# Patient Record
Sex: MALE | Race: WHITE | NOT HISPANIC OR LATINO | Employment: OTHER | URBAN - METROPOLITAN AREA
[De-identification: names, ages, dates, MRNs, and addresses within clinical notes are randomized per-mention and may not be internally consistent; named-entity substitution may affect disease eponyms.]

---

## 2017-01-04 ENCOUNTER — OFFICE VISIT (OUTPATIENT)
Dept: RHEUMATOLOGY | Facility: PHYSICIAN GROUP | Age: 77
End: 2017-01-04
Payer: MEDICARE

## 2017-01-04 VITALS — DIASTOLIC BLOOD PRESSURE: 58 MMHG | SYSTOLIC BLOOD PRESSURE: 150 MMHG | BODY MASS INDEX: 24.42 KG/M2 | WEIGHT: 163 LBS

## 2017-01-04 DIAGNOSIS — Z99.2 ESRD (END STAGE RENAL DISEASE) ON DIALYSIS (HCC): ICD-10-CM

## 2017-01-04 DIAGNOSIS — G56.03 CARPAL TUNNEL SYNDROME, BILATERAL: ICD-10-CM

## 2017-01-04 DIAGNOSIS — N25.81 SECONDARY RENAL HYPERPARATHYROIDISM (HCC): ICD-10-CM

## 2017-01-04 DIAGNOSIS — S37.009S: ICD-10-CM

## 2017-01-04 DIAGNOSIS — M19.042 PRIMARY OSTEOARTHRITIS OF BOTH HANDS: ICD-10-CM

## 2017-01-04 DIAGNOSIS — M1A.09X0 IDIOPATHIC CHRONIC GOUT OF MULTIPLE SITES WITHOUT TOPHUS: ICD-10-CM

## 2017-01-04 DIAGNOSIS — N18.6 ESRD (END STAGE RENAL DISEASE) ON DIALYSIS (HCC): ICD-10-CM

## 2017-01-04 DIAGNOSIS — M19.041 PRIMARY OSTEOARTHRITIS OF BOTH HANDS: ICD-10-CM

## 2017-01-04 PROCEDURE — 99214 OFFICE O/P EST MOD 30 MIN: CPT | Mod: 25 | Performed by: INTERNAL MEDICINE

## 2017-01-04 PROCEDURE — 20526 THER INJECTION CARP TUNNEL: CPT | Mod: 50 | Performed by: INTERNAL MEDICINE

## 2017-01-04 RX ORDER — METHYLPREDNISOLONE ACETATE 40 MG/ML
40 INJECTION, SUSPENSION INTRA-ARTICULAR; INTRALESIONAL; INTRAMUSCULAR; SOFT TISSUE ONCE
Status: DISCONTINUED | OUTPATIENT
Start: 2017-01-04 | End: 2017-01-13

## 2017-01-04 RX ORDER — FAMOTIDINE 20 MG/1
20 TABLET, FILM COATED ORAL 2 TIMES DAILY
COMMUNITY
End: 2017-04-15

## 2017-01-04 RX ADMIN — METHYLPREDNISOLONE ACETATE 20 MG: 40 INJECTION, SUSPENSION INTRA-ARTICULAR; INTRALESIONAL; INTRAMUSCULAR; SOFT TISSUE at 12:35

## 2017-01-04 NOTE — PROGRESS NOTES
Chief Complaint- joint pain    Subjective:   Parmjit Castelan is a 76 y.o. male here today for follow up of rheumatological issues    Patient here to followup bilateral hand pain, status post x-ray indicating DJD, status post evaluation with serologies all which were negative, status post EMG/MCV indicating moderate to severe carpal tunnel syndrome.  Patient with renal failure on dialysis 3 times a week and is unable to take NSAIDs, currently takes hydrocodone when necessary which did help at the beginning but patient states is no one helping. Patient also with chronic gout,  Patient states he hasn't had any gout flares since last visit while off of allopurinol, Patient also with coronary artery disease, recently status post a left arm renal dialysis shunt revision, also with COPD and obstructive sleep apnea. Patient also with polycystic kidney disease, on dialysis, patient also is sleep apnea and uses a nighttime oxygen, unable to use a C Pap machine  Patient here to follow-up on his carpal tunnel, patient states he's got numbness in his right hand thumb and index and third finger, and then also on the left thumb, patient queries about getting cortisone injections that he had received at last visit which helped quite a bit. If ineffective then we may consider surgical intervention with decompression.    S/p Uloric-unable to afford  S/p allopurinol-felt to cause HTN    Hep B neg 5/2009  Uric Acid 7.1 normal 6/2015  RF neg 6/2015  CCP neg 6/2015  AWILDA neg 5/2009  C3 90 normal 5/2009  C4 19 normal 5/2009  G6PD 12.0 adequate 1/2016  Hand X-rays done 6/2015 indicates DJD   EMG/NCV Right hand 8/2016-indicates moderate-severe CTS       Current medicines (including changes today)  Current Outpatient Prescriptions   Medication Sig Dispense Refill   • famotidine (PEPCID) 20 MG Tab Take 20 mg by mouth 2 times a day.     • tamsulosin (FLOMAX) 0.4 MG capsule TAKE TWO CAPSULES BY MOUTH DAILY 1/2 HOUR AFTER BREAKFAST 180 Cap 11   •  doxazosin (CARDURA) 4 MG Tab Take 1 Tab by mouth every day. 90 Tab 3   • oxycodone-acetaminophen (PERCOCET) 5-325 MG Tab 1 tab po bid prn severe pain NO ALCOHOL and NO DRIVING within 24 hrs of taking this medication, NO BENZODIAZEPINE MEDICATIONS, no selling or giving to any other persons (Patient taking differently: 1 Tab at bedtime as needed. 1 tab po bid prn severe pain NO ALCOHOL and NO DRIVING within 24 hrs of taking this medication, NO BENZODIAZEPINE MEDICATIONS, no selling or giving to any other persons) 60 Tab 0   • Calcium Acetate, Phos Binder, 667 MG Cap TAKE TWO CAPSULES BY MOUTH THREE TIMES A DAY WITH A MEAL 180 Cap 11   • labetalol (NORMODYNE) 100 MG Tab Take 5 Tabs by mouth 2 times a day. 300 Tab 6   • fluticasone (FLONASE) 50 MCG/ACT nasal spray PLACE ONE TO TWO SPRAYS IN EACH NOSTRIL EVERY DAY 1 Bottle 5   • omeprazole (PRILOSEC) 40 MG delayed-release capsule TAKE ONE CAPSULE BY MOUTH DAILY (GENERIC FOR PRILOSEC) 90 Cap 4   • epoetin hilario (EPOGEN,PROCRIT) 2000 UNIT/ML Solution Inject 1 mL as instructed every Monday, Wednesday, and Friday. 1 mL 0   • pravastatin (PRAVACHOL) 10 MG Tab Take 10 mg by mouth every evening.     • minoxidil (LONITEN) 2.5 MG Tab Take 2.5 mg by mouth every day.     • amlodipine (NORVASC) 10 MG Tab TAKE ONE TABLET BY MOUTH DAILY 90 Tab 4   • ondansetron (ZOFRAN ODT) 4 MG TABLET DISPERSIBLE DISSOLVE ONE TABLET BY MOUTH EVERY 6 HOURS AS NEEDED FOR NAUSEA 30 Tab 4   • budesonide-formoterol (SYMBICORT) 160-4.5 MCG/ACT Aerosol Inhale 2 Puffs by mouth 2 Times a Day. Inhale 2 puffs by mouth twice daily. Rinse mouth after each use. 1 Inhaler 12   • lisinopril (PRINIVIL, ZESTRIL) 40 MG tablet TAKE ONE TABLET BY MOUTH TWICE A DAY 60 Tab 11   • trazodone (DESYREL) 150 MG Tab TAKE TWO TABLETS BY MOUTH EVERY NIGHT AT BEDTIME (GENERIC FOR DESYREL) 180 Tab 4   • furosemide (LASIX) 40 MG Tab TAKE TWO TABLETS BY MOUTH TWICE A  Tab 9   • ipratropium-albuterol (COMBIVENT RESPIMAT)   MCG/ACT AERS Inhale 1 Puff by mouth 4 times a day. As needed for cough (Patient taking differently: Inhale 1 Puff by mouth as needed. As needed for cough) 1 Inhaler 1     Current Facility-Administered Medications   Medication Dose Route Frequency Provider Last Rate Last Dose   • methylPREDNISolone acetate (DEPO-MEDROL) injection 40 mg  40 mg Intra-articular Once Shauna Lorenzo M.D.         He  has a past medical history of BPH (7/14/2009); HTN (hypertension) (1/15/2010); Snoring; COPD (chronic obstructive pulmonary disease) ([facility]); Proteinuria; COPD (chronic obstructive pulmonary disease) ([facility]) (8/2/2011); EMPHYSEMA; Detached retina; CKD (chronic kidney disease) stage 4, GFR 15-29 ml/min ([facility]) (1/15/2010); Kidney cyst; On supplemental oxygen therapy; Heart burn; Indigestion; Bronchitis (1/1/13); CAD (coronary artery disease) (2/20/2014); Sleep apnea; Dialysis; CATARACT; Basal cell carcinoma of left cheek (3/26/2015); Leg pain, bilateral (8/10/2015); Gout; Pneumonia; Anesthesia; and High cholesterol. He also has no past medical history of Liver disease.    ROS   Other than what is mentioned in HPI or physical exam, there is no history of headaches, double vision or blurred vision. No temporal tenderness or jaw claudication. No history of cataracts or glaucoma. No trouble swallowing difficulties or sore throats. No history of thyroid disease. No chest complaints including chest pain, cough or sputum production. No shortness of breath. No GI complaints including nausea, vomiting, change in bowel habits, or past peptic ulcer disease. No history of blood in the stools. No urinary complaints including dysuria or frequency. No history of rash including psoriasis. No history of alopecia, photosensitivity, oral ulcerations, Raynaud's phenomena, or swollen joints. No history of gout. No back complaints. No history of low blood counts.       Objective:     Blood pressure 150/58, weight 73.936 kg (163 lb). Body mass index  is 24.42 kg/(m^2).   Physical Exam:    General:Alert, oriented X 3 in no acute distress.Eye contact is good, speech goal directed, affect calmHEENT: conjunctiva non-injected, sclera non-icteric.Pinna normal. TM pearly gray. Oral mucous membranes pink and moist with no lesions.Neck: No thyromegaly, trachea midline Lymph: No supraclavicular lymphadenopathy, no cervical lymphadenopathy, no axillary lymphadenopathyLungs: clear to auscultation bilaterally with good excursion.CV: regular rate and rhythm.Abdomen: soft, nontender, No CVAT, no HSMNeuro: Cranial nerves 2-12 are grossly intactSkin: No discoid lesions, no petechial lesions, no malar rash, no vasculitic lesionsExt: No swan-neck or boutonniere deformities, no sausage digits, no digital tip ulcerations, no ulnar deviation, no ulnar styloid process prominence, patient is able to make full fists but feels stiffness with movement. No evidence of thenar or hyperthenar eminence atrophy Patient has a renal dialysis shunt in the left arm.  right hand radial and ulnar pulses are 2+ and symmetrical, there is positive phalen sign bilaterally     Lab Results   Component Value Date/Time    HEPATITIS B CORS AB,IGM Negative 08/12/2016 05:40 PM    HEPATITIS B SURFACE ANTIGEN Negative 08/12/2016 05:40 PM     Lab Results   Component Value Date/Time    HEPATITIS C ANTIBODY Negative 08/12/2016 05:40 PM     Lab Results   Component Value Date/Time    SODIUM 140 12/27/2016 09:06 AM    POTASSIUM 4.6 12/27/2016 09:06 AM    CHLORIDE 99 12/27/2016 09:06 AM    CO2 30 12/27/2016 09:06 AM    GLUCOSE 92 12/27/2016 09:06 AM    BUN 35* 12/27/2016 09:06 AM    CREATININE 7.41* 12/27/2016 09:06 AM      Lab Results   Component Value Date/Time    WBC 4.3* 12/27/2016 09:06 AM    RBC 3.49* 12/27/2016 09:06 AM    HEMOGLOBIN 11.2* 12/27/2016 09:06 AM    HEMATOCRIT 34.9* 12/27/2016 09:06 AM    .0* 12/27/2016 09:06 AM    MCH 32.1 12/27/2016 09:06 AM    MCHC 32.1* 12/27/2016 09:06 AM    MPV 10.1  12/27/2016 09:06 AM    NEUTROPHILS-POLYS 67.50 12/27/2016 09:06 AM    LYMPHOCYTES 14.40* 12/27/2016 09:06 AM    MONOCYTES 8.90 12/27/2016 09:06 AM    EOSINOPHILS 7.80* 12/27/2016 09:06 AM    BASOPHILS 0.90 12/27/2016 09:06 AM      Lab Results   Component Value Date/Time    CALCIUM 10.2 12/27/2016 09:06 AM    AST(SGOT) 29 08/15/2016 11:56 PM    ALT(SGPT) 12 08/15/2016 11:56 PM    ALKALINE PHOSPHATASE 43 08/15/2016 11:56 PM    TOTAL BILIRUBIN 0.5 08/15/2016 11:56 PM    ALBUMIN 3.1* 08/15/2016 11:56 PM    TOTAL PROTEIN 5.0* 08/15/2016 11:56 PM     Lab Results   Component Value Date/Time    URIC ACID 4.6 01/08/2016 01:37 PM    RHEUMATOID FACTOR -NEPH- 11 06/22/2015 04:22 PM    CCP ANTIBODIES 2 06/22/2015 04:22 PM    ANTINUCLEAR ANTIBODY None Detected 05/27/2009 12:16 PM     Lab Results   Component Value Date/Time    C3 COMPLEMENT 90 05/27/2009 12:16 PM    COMPLEMENT C4 19 05/27/2009 12:16 PM     Lab Results   Component Value Date/Time    ANCA IGG <1:20 05/09/2016 02:45 AM    C3 COMPLEMENT 90 05/27/2009 12:16 PM     Lab Results   Component Value Date/Time    COLOR Yellow 10/11/2013 03:29 PM    SPECIFIC GRAVITY 1.019 10/11/2013 03:29 PM    PH 5.5 10/11/2013 03:29 PM    GLUCOSE Negative 10/11/2013 03:29 PM    KETONES Negative 10/11/2013 03:29 PM    PROTEIN >=300* 10/11/2013 03:29 PM     Lab Results   Component Value Date/Time    SED RATE WESTERGREN 1 05/27/2009 12:16 PM     Lab Results   Component Value Date/Time    G-6-PD 12.0 01/08/2016 01:37 PM     Lab Results   Component Value Date/Time    PTH, INTACT 291.8* 10/11/2013 03:28 PM     Results for orders placed during the hospital encounter of 11/23/07   DX-FOOT-COMPLETE 3+    Impression IMPRESSION:    1. DEGENERATIVE CHANGE OF THE BASE OF THE GREAT TOE INVOLVING THE FIRST   METATARSOPHALANGEAL JOINT WITH ASSOCIATED EDEMA.          2. POSSIBLE LYTIC LESION IN THE PROXIMAL FIFTH METATARSAL.  THE ETIOLOGY   IS UNCERTAIN.    3. NO RADIOPAQUE FOREIGN BODY OR SOFT TISSUE GAS  IDENTIFIED.    4. NO FRACTURE IS SEEN.    MM:dxm        Read By CATHY LYNN MD on Nov 23 2007  1:31PM  : DXM Transcription Date: Nov 23 2007  5:48PM  THIS DOCUMENT HAS BEEN ELECTRONICALLY SIGNED BY: CATHY LYNN MD on Nov 30 2007  8:14AM     Results for orders placed during the hospital encounter of 11/27/07   DX-PELVIS-1 OR 2 VIEWS    Impression IMPRESSION:    MILD TO MODERATE OSTEOARTHRITIS IS NOTED IN BOTH HIPS, BUT NO ACUTE   FRACTURE OR DISLOCATION IS NOTED ON SINGLE VIEW PELVIS.             Results for orders placed during the hospital encounter of 11/27/07   DX-PELVIS-1 OR 2 VIEWS    Impression IMPRESSION:    MILD TO MODERATE OSTEOARTHRITIS IS NOTED IN BOTH HIPS, BUT NO ACUTE   FRACTURE OR DISLOCATION IS NOTED ON SINGLE VIEW PELVIS.             Results for orders placed during the hospital encounter of 06/22/15   DX-HAND 3+    Impression Mild degenerative change of the interphalangeal articulations and the triscaphe articulation.       Results for orders placed during the hospital encounter of 07/14/16   DX-CERVICAL SPINE-2 OR 3 VIEWS    Impression 1.  Degenerative disc disease and facet arthropathy are most prominent in the lower cervical spine.    2.  No compression deformity or acute fracture.    3.  Anterolisthesis noted at C7-T1 likely due to facet arthropathy.     Results for orders placed during the hospital encounter of 10/16/10   CT-CHEST (THORAX) W/O     Results for orders placed during the hospital encounter of 10/15/13   US-VISCERAL VASCULAR COMP    Impression 1.  No evidence of renal artery stenosis.  2.  Borderline to mildly increased intrarenal resistive indices are likely related to an intrarenal process.            INTERPRETING LOCATION: [address] [place] [address], [id number]     Assessment and Plan:     1. Carpal tunnel syndrome, bilateral  With exacerbation in spite of splints, patient did get a cortisone injection in the right wrist carpal tunnel August 2016 with good results,  today we'll do bilateral carpal tunnel cortisone injections  - methylPREDNISolone acetate (DEPO-MEDROL) injection 40 mg; 1 mL by Intra-articular route Once.    2. Idiopathic chronic gout of multiple sites without tophus  Patient is off of any medication and doing well, we'll continue to monitor    3. Primary osteoarthritis of both hands  Unable to take NSAIDs because of renal insufficiency, Tylenol or cortisone injections when necessary    4. ESRD (end stage renal disease) on dialysis (HCC)  This may impact what medications we can use to treat this patient's rheumatological disease, we will have to continue to monitor closely.    5. Kidney damage, unspecified laterality, sequela  This may impact what medications we can use to treat this patient's rheumatological disease, we will have to continue to monitor closely.l    6. Secondary renal hyperparathyroidism (HCC)    Followup: Return in about 4 months (around 5/4/2017). or sooner prn need for repeat cortisone injections    Patient was seen 30 minutes face-to-face of which more than 50% of the time was spent counseling the patient (excluding time for procedures)  regarding  rheumatological condition and care. Therapy was discussed in detail.    Please note that this dictation was created using voice recognition software. I have made every reasonable attempt to correct obvious errors, but I expect that there are errors of grammar and possibly content that I did not discover before finalizing the note.    Procedure: Bilateral carpal tunnel cortisone injection    The procedure was explained to the patient in detail, all questions were answered. Risks and benefits were reviewed with patient and patient states understanding including risks of steroid injections including bleeding, infections, subcutaneous lipolysis and/or hypopigmentation at site of injection, and risk of avascular necrosis. Consent was obtained. The patient was positioned appropriately. Anatomical  landmarks were identified.    Ventral aspects bilateral wrists was prepped with betadine x 3, local anesthetic with ethyl chloride and 1% Xylocaine lot #[id number], Exp March 2020 and using sterile technique,  injected 10 mg of Depomedrol Lot #[id number], Exp October 2017 bilateral wrists carpal tunnel    EBL 0  The patient tolerated the procedure well, was observed in the office and there were no complications     Patient was asked to rest bilateral wrists for 3 days, patient states understanding and states will comply.  Approximately 30 minutes time was spent face to face with patient of which at least 50% of time was reviewing risk and benefits of procedure and the procedure in detail.

## 2017-01-04 NOTE — Clinical Note
[facility]   [address], [address]  [place] [address]  Phone: [phone]  Fax: [phone]              Encounter Date: 1/4/2017    Dear Dr. Echavarria ref. provider found,    It was a pleasure seeing your patient, Parmjit Castelan, on 1/4/2017. Diagnoses of Carpal tunnel syndrome, bilateral, Idiopathic chronic gout of multiple sites without tophus, Primary osteoarthritis of both hands, ESRD (end stage renal disease) on dialysis (HCC), Kidney damage, unspecified laterality, sequela, and Secondary renal hyperparathyroidism (HCC) were pertinent to this visit.     Please find attached progress note which includes the history I obtained from Mr. Castelan, my physical examination findings, my impression and recommendations.      Once again, it was a pleasure participating in your patient's care.  Please feel free to contact me if you have any questions or if I can be of any further assistance to your patients.      Sincerely,    Shauna Lorenzo M.D.  Electronically Signed          PROGRESS NOTE:  No notes on file

## 2017-01-24 RX ORDER — METHYLPREDNISOLONE ACETATE 40 MG/ML
40 INJECTION, SUSPENSION INTRA-ARTICULAR; INTRALESIONAL; INTRAMUSCULAR; SOFT TISSUE ONCE
OUTPATIENT
Start: 2017-01-24 | End: 2017-01-25

## 2017-01-25 ENCOUNTER — TELEPHONE (OUTPATIENT)
Dept: RHEUMATOLOGY | Facility: PHYSICIAN GROUP | Age: 77
End: 2017-01-25

## 2017-01-25 DIAGNOSIS — G56.03 BILATERAL CARPAL TUNNEL SYNDROME: ICD-10-CM

## 2017-01-25 NOTE — TELEPHONE ENCOUNTER
Please notify patient that a hand surgeon will want to have something called a nerve conduction study to confirm carpal tunnel in both hands, I will order the nerve conduction study to get done, I have also put the referral and for the orthopedic surgeon that patient will need to get the nerve study done first before he sees the orthopedic surgeon.

## 2017-01-25 NOTE — TELEPHONE ENCOUNTER
1. Caller Name: gabriela Castelan                                         Call Back Number: [phone]      Patient approves a detailed voicemail message: yes    Pt called wanting to know if you would please refer him to a hand surgeon. The cortisone injection you gave him didn't help at all and he would like a referral.  Thank you

## 2017-02-03 ENCOUNTER — NON-PROVIDER VISIT (OUTPATIENT)
Dept: NEUROLOGY | Facility: MEDICAL CENTER | Age: 77
End: 2017-02-03
Payer: MEDICARE

## 2017-02-03 DIAGNOSIS — N18.6 END STAGE RENAL DISEASE (HCC): ICD-10-CM

## 2017-02-03 DIAGNOSIS — G56.03 CARPAL TUNNEL SYNDROME, BILATERAL: ICD-10-CM

## 2017-02-03 PROCEDURE — 95910 NRV CNDJ TEST 7-8 STUDIES: CPT

## 2017-02-03 NOTE — MR AVS SNAPSHOT
Parmjit Lisagreer   2/3/2017 11:00 AM   Appointment   MRN: [id number]    Department:  [facility]   Dept Phone:  [phone]    Description:  Male : 1940   Provider:  [facility]           Allergies as of 2/3/2017     Allergen Noted Reactions    Betadine [Povidone Iodine] 2016   Rash, Itching    Per patient      Vital Signs     Smoking Status                   Former Smoker           Basic Information     Date Of Birth Sex Race Ethnicity Preferred Language    1940 Male White Non- English      Your appointments     May 09, 2017  2:15 PM   FOLLOW UP with James Mendoza M.D.   [facility] (--)    [address], [address]  [facility] [address] [phone]              Problem List              ICD-10-CM Priority Class Noted - Resolved    BPH (benign prostatic hypertrophy) N40.0 Low  2009 - Present    Essential hypertension I10 High  1/15/2010 - Present    Hard of hearing H91.90   2011 - Present    Preventative health care Z00.00   2011 - Present    HELGA (obstructive sleep apnea) G47.33   2011 - Present    COPD (chronic obstructive pulmonary disease) (CMS-HCC) J44.9   2011 - Present    BPH (benign prostatic hyperplasia) N40.0   2012 - Present    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   2012 - Present    Kidney damage S37.009A   2013 - Present    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   2013 - Present    Elevated coronary artery calcium score I25.10 High  2014 - Present    End stage renal disease (HCC) N18.6   3/24/2014 - Present    Dyslipidemia E78.5 Low  2014 - Present    Pigmented skin lesion L81.9   3/18/2015 - Present    Basal cell carcinoma of left cheek C44.319   3/26/2015 - Present    Anemia in CKD (chronic kidney disease) N18.9, D63.1   2015 - Present    Leg pain, bilateral M79.604, M79.605   8/10/2015 - Present    Primary osteoarthritis of both hands  M19.041, M19.042   8/20/2015 - Present    Chronic gout M1A.9XX0   1/7/2016 - Present    Respiratory failure (CMS-HCC) J96.90   5/8/2016 - Present    Hyperkalemia, diminished renal excretion E87.5   5/8/2016 - Present    Pneumonia J18.9   5/8/2016 - Present    Pulmonary edema J81.1   5/8/2016 - Present    Anemia D64.9   5/8/2016 - Present    Leukocytosis D72.829   5/8/2016 - Present    COPD exacerbation (CMS-HCC) J44.1   5/8/2016 - Present    ESRD (end stage renal disease) on dialysis (CMS-HCC) N18.6, Z99.2   5/8/2016 - Present    Fluid overload E87.70   5/8/2016 - Present    Systolic CHF, acute (CMS-HCC) I50.21   5/11/2016 - Present    HTN (hypertension), malignant I10   5/11/2016 - Present    Bacteremia due to Staphylococcus aureus R78.81   5/11/2016 - Present    Hyperkalemia E87.5   5/11/2016 - Present    Renal cyst N28.1   5/11/2016 - Present    ESRD (end stage renal disease) (CMS-HCC) N18.6   8/12/2016 - Present    GIB (gastrointestinal bleeding) K92.2 High  8/12/2016 - Present    Hypotension I95.9 Medium  8/13/2016 - Present    Need for prophylactic vaccination with combined vaccine Z23   9/8/2016 - Present    AVM (arteriovenous malformation) Q27.30   9/8/2016 - Present      Health Maintenance        Date Due Completion Dates    IMM DTaP/Tdap/Td Vaccine (1 - Tdap) 7/30/1961 ---    IMM ZOSTER VACCINE 7/30/2002 ---    IMM INFLUENZA (1) 9/1/2016 10/1/2013    IMM PNEUMOCOCCAL 65+ (ADULT) HIGH/HIGHEST RISK SERIES (2 of 2 - PCV13) 9/8/2017 9/8/2016    COLONOSCOPY 8/15/2026 8/15/2016, 9/16/2013            Current Immunizations     Influenza TIV (IM) 10/1/2013    Pneumococcal polysaccharide vaccine (PPSV-23) 9/8/2016      Below and/or attached are the medications your provider expects you to take. Review all of your home medications and newly ordered medications with your provider and/or pharmacist. Follow medication instructions as directed by your provider and/or pharmacist. Please keep your medication list with you  and share with your provider. Update the information when medications are discontinued, doses are changed, or new medications (including over-the-counter products) are added; and carry medication information at all times in the event of emergency situations     Allergies:  BETADINE - Rash,Itching               Medications  Valid as of: February 03, 2017 - 12:40 PM    Generic Name Brand Name Tablet Size Instructions for use    AmLODIPine Besylate (Tab) NORVASC 10 MG TAKE ONE TABLET BY MOUTH DAILY        Budesonide-Formoterol Fumarate (Aerosol) SYMBICORT 160-4.5 MCG/ACT Inhale 2 Puffs by mouth 2 Times a Day. Inhale 2 puffs by mouth twice daily. Rinse mouth after each use.        Calcium Acetate (Phos Binder) (Cap) Calcium Acetate (Phos Binder) 667 MG TAKE TWO CAPSULES BY MOUTH THREE TIMES A DAY WITH A MEAL        Doxazosin Mesylate (Tab) CARDURA 4 MG Take 1 Tab by mouth every day.        Epoetin Mitchel (Solution) EPOGEN,PROCRIT 2000 UNIT/ML Inject 1 mL as instructed every Monday, Wednesday, and Friday.        Famotidine (Tab) PEPCID 20 MG Take 20 mg by mouth 2 times a day.        Fluticasone Propionate (Suspension) FLONASE 50 MCG/ACT PLACE ONE TO TWO SPRAYS IN EACH NOSTRIL EVERY DAY        Furosemide (Tab) LASIX 40 MG TAKE TWO TABLETS BY MOUTH TWICE A DAY        Ipratropium-Albuterol (Aero Soln) COMBIVENT RESPIMAT  MCG/ACT Inhale 1 Puff by mouth 4 times a day. As needed for cough        Labetalol HCl (Tab) NORMODYNE 100 MG Take 5 Tabs by mouth 2 times a day.        Lisinopril (Tab) PRINIVIL, ZESTRIL 40 MG TAKE ONE TABLET BY MOUTH TWICE A DAY        Minoxidil (Tab) LONITEN 2.5 MG Take 2.5 mg by mouth every day.        Omeprazole (CAPSULE DELAYED RELEASE) PRILOSEC 40 MG TAKE ONE CAPSULE BY MOUTH DAILY (GENERIC FOR PRILOSEC)        Ondansetron (TABLET DISPERSIBLE) ZOFRAN ODT 4 MG DISSOLVE ONE TABLET BY MOUTH EVERY 6 HOURS AS NEEDED FOR NAUSEA        Oxycodone-Acetaminophen (Tab) PERCOCET 5-325 MG 1 tab po bid prn  severe pain NO ALCOHOL and NO DRIVING within 24 hrs of taking this medication, NO BENZODIAZEPINE MEDICATIONS, no selling or giving to any other persons        Pravastatin Sodium (Tab) PRAVACHOL 10 MG Take 10 mg by mouth every evening.        Tamsulosin HCl (Cap) FLOMAX 0.4 MG TAKE TWO CAPSULES BY MOUTH DAILY 1/2 HOUR AFTER BREAKFAST        TraZODone HCl (Tab) DESYREL 150 MG TAKE TWO TABLETS BY MOUTH EVERY NIGHT AT BEDTIME (GENERIC FOR DESYREL)        .                 Medicines prescribed today were sent to:     [facility] #[id number] - [place], [place] - [address]    [address] [place] [address]    Phone: [phone] Fax: [phone]    Open 24 Hours?: No      Medication refill instructions:       If your prescription bottle indicates you have medication refills left, it is not necessary to call your provider’s office. Please contact your pharmacy and they will refill your medication.    If your prescription bottle indicates you do not have any refills left, you may request refills at any time through one of the following ways: The online Penxy system (except Urgent Care), by calling your provider’s office, or by asking your pharmacy to contact your provider’s office with a refill request. Medication refills are processed only during regular business hours and may not be available until the next business day. Your provider may request additional information or to have a follow-up visit with you prior to refilling your medication.   *Please Note: Medication refills are assigned a new Rx number when refilled electronically. Your pharmacy may indicate that no refills were authorized even though a new prescription for the same medication is available at the pharmacy. Please request the medicine by name with the pharmacy before contacting your provider for a refill.           Penxy Access Code: [email or web address]  Expires: 3/5/2017 10:55 AM    Penxy  A secure, online tool to manage your health  information     [employer]’s [employer]® is a secure, online tool that connects you to your personalized health information from the privacy of your home -- day or night - making it very easy for you to manage your healthcare. Once the activation process is completed, you can even access your medical information using the [employer] carmelo, which is available for free in the Apple Carmelo store or Google Play store.     [employer] Ltd provides the following levels of access (as shown below):   My Chart Features   [facility] Doctor [facility]  Specialists [facility]  Urgent  Care [facility]  Primary Care  Doctor   Email your healthcare team securely and privately 24/7 X X X    Manage appointments: schedule your next appointment; view details of past/upcoming appointments X      Request prescription refills. X      View recent personal medical records, including lab and immunizations X X X X   View health record, including health history, allergies, medications X X X X   Read reports about your outpatient visits, procedures, consult and ER notes X X X X   See your discharge summary, which is a recap of your hospital and/or ER visit that includes your diagnosis, lab results, and care plan. X X       How to register for [employer]:  1. Go to  [email or web address].  2. Click on the Sign Up Now box, which takes you to the New Member Sign Up page. You will need to provide the following information:  a. Enter your [employer] Access Code exactly as it appears at the top of this page. (You will not need to use this code after you’ve completed the sign-up process. If you do not sign up before the expiration date, you must request a new code.)   b. Enter your date of birth.   c. Enter your home email address.   d. Click Submit, and follow the next screen’s instructions.  3. Create a [employer] ID. This will be your [employer] login ID and cannot be changed, so think of one that is secure and easy to remember.  4. Create a [employer] password. You can  change your password at any time.  5. Enter your Password Reset Question and Answer. This can be used at a later time if you forget your password.   6. Enter your e-mail address. This allows you to receive e-mail notifications when new information is available in [employer].  7. Click Sign Up. You can now view your health information.    For assistance activating your [employer] account, call [phone]

## 2017-02-04 NOTE — PROCEDURES
DATE OF SERVICE:  02/03/2017    REFERRING PHYSICIAN:  Ravi Smith MD    EMG nerve conduction studies were requested in both upper extremities for   possible carpal tunnel syndrome.  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    Full report was scanned into the Epic system.  The median motor latency is   prolonged bilaterally to approximately the same degree as 6.7-6.9 milliseconds   compared to ulnar latencies of approx. 3 milliseconds.  Ulnar conduction  Across the wrist and around he elbow is normal but conduction velocities are below normal  indicating neuropathy undoubtedly related to renal failure.  The median sensory latency on the right is delayed as well.  Interestingly, the right radial sensory is   absent.  Needle exam was not done.    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  Clinical correlation   suggested.       ____________________________________     MD DARIUSZ GUTHRIE / DELANO    DD:  02/03/2017 12:39:13  DT:  02/03/2017 17:20:46    D#:  [id number]  Job#:  [id number]    cc: RAVI SMITH MD, [facility]

## 2017-02-08 ENCOUNTER — TELEPHONE (OUTPATIENT)
Dept: RHEUMATOLOGY | Facility: PHYSICIAN GROUP | Age: 77
End: 2017-02-08

## 2017-02-08 NOTE — TELEPHONE ENCOUNTER
Please notify patient his nerve study shows carpal tunnel syndrome both hands and also entrapment of nerve in his left elbow, referral to orthopedics for decompression of nerves was already submitted January 25, 2017, recommended patient follow-up with orthopedics

## 2017-02-15 DIAGNOSIS — N18.6 END STAGE RENAL DISEASE (HCC): Primary | ICD-10-CM

## 2017-02-15 NOTE — TELEPHONE ENCOUNTER
Was the patient seen in the last year in this department? No  dialysis pt    Does patient have an active prescription for medications requested? Yes     Received Request Via: Pharmacy

## 2017-02-16 RX ORDER — ASCORBIC ACID, THIAMINE, RIBOFLAVIN, NIACINAMIDE, PYRIDOXINE, FOLIC ACID, COBALAMIN, BIOTIN, PANTOTHENIC ACID 100; 1.5; 1.7; 20; 10; 1; 6; 300; 1 MG/1; MG/1; MG/1; MG/1; MG/1; MG/1; UG/1; UG/1; MG/1
TABLET, COATED ORAL
Qty: 90 TAB | Refills: 4 | Status: ON HOLD | OUTPATIENT
Start: 2017-02-16 | End: 2018-03-20

## 2017-02-17 RX ORDER — METHYLPREDNISOLONE ACETATE 40 MG/ML
40 INJECTION, SUSPENSION INTRA-ARTICULAR; INTRALESIONAL; INTRAMUSCULAR; SOFT TISSUE ONCE
Status: COMPLETED | OUTPATIENT
Start: 2017-02-17 | End: 2017-01-04

## 2017-02-22 ENCOUNTER — TELEPHONE (OUTPATIENT)
Dept: CARDIOLOGY | Facility: MEDICAL CENTER | Age: 77
End: 2017-02-22

## 2017-02-22 DIAGNOSIS — R93.1 ELEVATED CORONARY ARTERY CALCIUM SCORE: ICD-10-CM

## 2017-02-22 DIAGNOSIS — E78.5 DYSLIPIDEMIA: ICD-10-CM

## 2017-02-22 DIAGNOSIS — I10 ESSENTIAL HYPERTENSION: ICD-10-CM

## 2017-03-09 ENCOUNTER — HOSPITAL ENCOUNTER (OUTPATIENT)
Dept: LAB | Facility: MEDICAL CENTER | Age: 77
End: 2017-03-09
Attending: INTERNAL MEDICINE
Payer: MEDICARE

## 2017-03-09 ENCOUNTER — HOSPITAL ENCOUNTER (OUTPATIENT)
Dept: LAB | Facility: MEDICAL CENTER | Age: 77
End: 2017-03-09
Attending: ANESTHESIOLOGY
Payer: MEDICARE

## 2017-03-09 ENCOUNTER — TELEPHONE (OUTPATIENT)
Dept: CARDIOLOGY | Facility: MEDICAL CENTER | Age: 77
End: 2017-03-09

## 2017-03-09 DIAGNOSIS — I10 ESSENTIAL HYPERTENSION: ICD-10-CM

## 2017-03-09 DIAGNOSIS — R93.1 ELEVATED CORONARY ARTERY CALCIUM SCORE: ICD-10-CM

## 2017-03-09 DIAGNOSIS — E78.5 DYSLIPIDEMIA: ICD-10-CM

## 2017-03-09 LAB
ALBUMIN SERPL BCP-MCNC: 3.3 G/DL (ref 3.2–4.9)
ALBUMIN/GLOB SERPL: 1.2 G/DL
ALP SERPL-CCNC: 93 U/L (ref 30–99)
ALT SERPL-CCNC: 16 U/L (ref 2–50)
ANION GAP SERPL CALC-SCNC: 10 MMOL/L (ref 0–11.9)
ANION GAP SERPL CALC-SCNC: 9 MMOL/L (ref 0–11.9)
AST SERPL-CCNC: 23 U/L (ref 12–45)
BILIRUB SERPL-MCNC: 0.7 MG/DL (ref 0.1–1.5)
BUN SERPL-MCNC: 23 MG/DL (ref 8–22)
BUN SERPL-MCNC: 23 MG/DL (ref 8–22)
CALCIUM SERPL-MCNC: 9.6 MG/DL (ref 8.4–10.2)
CALCIUM SERPL-MCNC: 9.6 MG/DL (ref 8.4–10.2)
CHLORIDE SERPL-SCNC: 98 MMOL/L (ref 96–112)
CHLORIDE SERPL-SCNC: 98 MMOL/L (ref 96–112)
CHOLEST SERPL-MCNC: 92 MG/DL (ref 100–199)
CO2 SERPL-SCNC: 33 MMOL/L (ref 20–33)
CO2 SERPL-SCNC: 35 MMOL/L (ref 20–33)
CREAT SERPL-MCNC: 6.09 MG/DL (ref 0.5–1.4)
CREAT SERPL-MCNC: 6.36 MG/DL (ref 0.5–1.4)
GFR SERPL CREATININE-BSD FRML MDRD: 9 ML/MIN/1.73 M 2
GFR SERPL CREATININE-BSD FRML MDRD: 9 ML/MIN/1.73 M 2
GLOBULIN SER CALC-MCNC: 2.8 G/DL (ref 1.9–3.5)
GLUCOSE SERPL-MCNC: 92 MG/DL (ref 65–99)
GLUCOSE SERPL-MCNC: 93 MG/DL (ref 65–99)
HDLC SERPL-MCNC: 60 MG/DL
LDLC SERPL CALC-MCNC: 27 MG/DL
POTASSIUM SERPL-SCNC: 4.4 MMOL/L (ref 3.6–5.5)
POTASSIUM SERPL-SCNC: 4.4 MMOL/L (ref 3.6–5.5)
PROT SERPL-MCNC: 6.1 G/DL (ref 6–8.2)
SODIUM SERPL-SCNC: 141 MMOL/L (ref 135–145)
SODIUM SERPL-SCNC: 142 MMOL/L (ref 135–145)
TRIGL SERPL-MCNC: 27 MG/DL (ref 0–149)

## 2017-03-09 PROCEDURE — 80053 COMPREHEN METABOLIC PANEL: CPT

## 2017-03-09 PROCEDURE — 80061 LIPID PANEL: CPT

## 2017-03-09 NOTE — TELEPHONE ENCOUNTER
Received call from lab with Creatinine of 6.36.  S/w pt, states has chronic kidney disease, previous Creatinine 7.41 in December 2016. Pt has dialysis every M-W-F.  FK aware.    Pt is also going to s/w FK at appt on 3/16/17 re: his lower leg swelling that he experiences after dialysis.

## 2017-03-16 ENCOUNTER — OFFICE VISIT (OUTPATIENT)
Dept: CARDIOLOGY | Facility: MEDICAL CENTER | Age: 77
End: 2017-03-16
Payer: MEDICARE

## 2017-03-16 VITALS
BODY MASS INDEX: 23.99 KG/M2 | SYSTOLIC BLOOD PRESSURE: 150 MMHG | HEART RATE: 76 BPM | DIASTOLIC BLOOD PRESSURE: 50 MMHG | WEIGHT: 162 LBS | HEIGHT: 69 IN | OXYGEN SATURATION: 91 %

## 2017-03-16 DIAGNOSIS — R06.09 DYSPNEA ON EXERTION: ICD-10-CM

## 2017-03-16 DIAGNOSIS — R93.1 ELEVATED CORONARY ARTERY CALCIUM SCORE: ICD-10-CM

## 2017-03-16 DIAGNOSIS — I10 ESSENTIAL HYPERTENSION: ICD-10-CM

## 2017-03-16 DIAGNOSIS — E78.5 DYSLIPIDEMIA: ICD-10-CM

## 2017-03-16 PROCEDURE — G8432 DEP SCR NOT DOC, RNG: HCPCS | Performed by: INTERNAL MEDICINE

## 2017-03-16 PROCEDURE — 99214 OFFICE O/P EST MOD 30 MIN: CPT | Performed by: INTERNAL MEDICINE

## 2017-03-16 PROCEDURE — G8599 NO ASA/ANTIPLAT THER USE RNG: HCPCS | Performed by: INTERNAL MEDICINE

## 2017-03-16 PROCEDURE — G8420 CALC BMI NORM PARAMETERS: HCPCS | Performed by: INTERNAL MEDICINE

## 2017-03-16 PROCEDURE — G8484 FLU IMMUNIZE NO ADMIN: HCPCS | Performed by: INTERNAL MEDICINE

## 2017-03-16 PROCEDURE — 1101F PT FALLS ASSESS-DOCD LE1/YR: CPT | Performed by: INTERNAL MEDICINE

## 2017-03-16 PROCEDURE — 1036F TOBACCO NON-USER: CPT | Performed by: INTERNAL MEDICINE

## 2017-03-16 PROCEDURE — 4040F PNEUMOC VAC/ADMIN/RCVD: CPT | Performed by: INTERNAL MEDICINE

## 2017-03-16 PROCEDURE — 3017F COLORECTAL CA SCREEN DOC REV: CPT | Performed by: INTERNAL MEDICINE

## 2017-03-16 NOTE — MR AVS SNAPSHOT
"        Parmjit Castelan   3/16/2017 3:00 PM   Office Visit   MRN: [id number]    Department:  [facility]   Dept Phone:  [phone]    Description:  Male : 1940   Provider:  James Mendoza M.D.           Reason for Visit     Follow-Up           Allergies as of 3/16/2017     Allergen Noted Reactions    Betadine [Povidone Iodine] 2016   Rash, Itching    Per patient      You were diagnosed with     Essential hypertension   [2764070]       Elevated coronary artery calcium score   [7278632]       Dyslipidemia   [429874]       Dyspnea on exertion   [452309]         Vital Signs     Blood Pressure Pulse Height Weight Body Mass Index Oxygen Saturation    150/50 mmHg 76 1.74 m (5' 8.5\") 73.483 kg (162 lb) 24.27 kg/m2 91%    Smoking Status                   Former Smoker           Basic Information     Date Of Birth Sex Race Ethnicity Preferred Language    1940 Male White Non- English      Your appointments     Mar 21, 2017  3:00 PM   Established Patient Pul with ILAN Valdes   [facility] (--)    [address]  [address]  [place] [address] [phone]            2017  4:15 PM   ECHO with ECHO [facility] EXAM 10   ECHOCARDIOLOGY [facility] ([facility])    [address] [place] [address]   [phone]           No prep            2017  1:00 PM   FOLLOW UP with James Mendoza M.D.   [facility] (--)    [address], [address]  [place] [address] [phone]              Problem List              ICD-10-CM Priority Class Noted - Resolved    BPH (benign prostatic hypertrophy) N40.0 Low  2009 - Present    Essential hypertension I10 High  1/15/2010 - Present    Hard of hearing H91.90   2011 - Present    Preventative health care Z00.00   2011 - Present    HELGA (obstructive sleep apnea) G47.33   2011 - Present    COPD (chronic obstructive pulmonary disease) (CMS-[facility]) J44.9   2011 - " Present    BPH (benign prostatic hyperplasia) N40.0   1/13/2012 - Present    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   12/14/2012 - Present    Kidney damage S37.009A   1/29/2013 - Present    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   7/2/2013 - Present    Elevated coronary artery calcium score I25.10 High  2/20/2014 - Present    End stage renal disease (HCC) N18.6   3/24/2014 - Present    Dyslipidemia E78.5 Low  12/5/2014 - Present    Pigmented skin lesion L81.9   3/18/2015 - Present    Basal cell carcinoma of left cheek C44.319   3/26/2015 - Present    Anemia in CKD (chronic kidney disease) N18.9, D63.1   5/29/2015 - Present    Leg pain, bilateral M79.604, M79.605   8/10/2015 - Present    Primary osteoarthritis of both hands M19.041, M19.042   8/20/2015 - Present    Chronic gout M1A.9XX0   1/7/2016 - Present    Respiratory failure (CMS-HCC) J96.90   5/8/2016 - Present    Hyperkalemia, diminished renal excretion E87.5   5/8/2016 - Present    Pneumonia J18.9   5/8/2016 - Present    Pulmonary edema J81.1   5/8/2016 - Present    Anemia D64.9   5/8/2016 - Present    Leukocytosis D72.829   5/8/2016 - Present    COPD exacerbation (CMS-HCC) J44.1   5/8/2016 - Present    ESRD (end stage renal disease) on dialysis (CMS-HCC) N18.6, Z99.2   5/8/2016 - Present    Fluid overload E87.70   5/8/2016 - Present    Systolic CHF, acute (CMS-HCC) I50.21   5/11/2016 - Present    HTN (hypertension), malignant I10   5/11/2016 - Present    Bacteremia due to Staphylococcus aureus R78.81   5/11/2016 - Present    Hyperkalemia E87.5   5/11/2016 - Present    Renal cyst N28.1   5/11/2016 - Present    ESRD (end stage renal disease) (CMS-HCC) N18.6   8/12/2016 - Present    GIB (gastrointestinal bleeding) K92.2 High  8/12/2016 - Present    Hypotension I95.9 Medium  8/13/2016 - Present    Need for prophylactic vaccination with combined vaccine Z23   9/8/2016 - Present    AVM (arteriovenous malformation) Q27.30   9/8/2016 - Present    Dyspnea on exertion R06.09   3/16/2017 - Present      Health Maintenance        Date Due Completion Dates    IMM DTaP/Tdap/Td Vaccine (1 - Tdap) 7/30/1961 ---    IMM ZOSTER VACCINE 7/30/2002 ---    IMM INFLUENZA (1) 9/1/2016 10/1/2013    IMM PNEUMOCOCCAL 65+ (ADULT) HIGH/HIGHEST RISK SERIES (2 of 2 - PCV13) 9/8/2017 9/8/2016    COLONOSCOPY 8/15/2026 8/15/2016, 9/16/2013            Current Immunizations     Influenza TIV (IM) 10/1/2013    Pneumococcal polysaccharide vaccine (PPSV-23) 9/8/2016      Below and/or attached are the medications your provider expects you to take. Review all of your home medications and newly ordered medications with your provider and/or pharmacist. Follow medication instructions as directed by your provider and/or pharmacist. Please keep your medication list with you and share with your provider. Update the information when medications are discontinued, doses are changed, or new medications (including over-the-counter products) are added; and carry medication information at all times in the event of emergency situations     Allergies:  BETADINE - Rash,Itching               Medications  Valid as of: March 16, 2017 -  3:48 PM    Generic Name Brand Name Tablet Size Instructions for use    AmLODIPine Besylate (Tab) NORVASC 10 MG TAKE ONE TABLET BY MOUTH DAILY        B Complex-C-Folic Acid (Tab) DIALYVITE TABLET  TAKE ONE TABLET BY MOUTH DAILY        Budesonide-Formoterol Fumarate (Aerosol) SYMBICORT 160-4.5 MCG/ACT Inhale 2 Puffs by mouth 2 Times a Day. Inhale 2 puffs by mouth twice daily. Rinse mouth after each use.        Calcium Acetate (Phos Binder) (Cap) Calcium Acetate (Phos Binder) 667 MG TAKE TWO CAPSULES BY MOUTH THREE TIMES A DAY WITH A MEAL        Doxazosin Mesylate (Tab) CARDURA 4 MG Take 1 Tab by mouth every day.        Epoetin Mitchel (Solution) EPOGEN,PROCRIT 2000 UNIT/ML Inject 1 mL as instructed every Monday, Wednesday, and Friday.        Famotidine (Tab) PEPCID 20 MG Take 20 mg  by mouth 2 times a day.        Fluticasone Propionate (Suspension) FLONASE 50 MCG/ACT PLACE ONE TO TWO SPRAYS IN EACH NOSTRIL EVERY DAY        Furosemide (Tab) LASIX 40 MG TAKE TWO TABLETS BY MOUTH TWICE A DAY        Ipratropium-Albuterol (Aero Soln) COMBIVENT RESPIMAT  MCG/ACT Inhale 1 Puff by mouth 4 times a day. As needed for cough        Labetalol HCl (Tab) NORMODYNE 100 MG Take 5 Tabs by mouth 2 times a day.        Lisinopril (Tab) PRINIVIL, ZESTRIL 40 MG TAKE ONE TABLET BY MOUTH TWICE A DAY        Minoxidil (Tab) LONITEN 2.5 MG Take 2.5 mg by mouth every day.        Omeprazole (CAPSULE DELAYED RELEASE) PRILOSEC 40 MG TAKE ONE CAPSULE BY MOUTH DAILY (GENERIC FOR PRILOSEC)        Ondansetron (TABLET DISPERSIBLE) ZOFRAN ODT 4 MG DISSOLVE ONE TABLET BY MOUTH EVERY 6 HOURS AS NEEDED FOR NAUSEA        Oxycodone-Acetaminophen (Tab) PERCOCET 5-325 MG 1 tab po bid prn severe pain NO ALCOHOL and NO DRIVING within 24 hrs of taking this medication, NO BENZODIAZEPINE MEDICATIONS, no selling or giving to any other persons        Pravastatin Sodium (Tab) PRAVACHOL 10 MG Take 10 mg by mouth every evening.        Tamsulosin HCl (Cap) FLOMAX 0.4 MG TAKE TWO CAPSULES BY MOUTH DAILY 1/2 HOUR AFTER BREAKFAST        TraZODone HCl (Tab) DESYREL 150 MG TAKE TWO TABLETS BY MOUTH EVERY NIGHT AT BEDTIME (GENERIC FOR DESYREL)        .                 Medicines prescribed today were sent to:     [facility] #[id number] [address] [address]    Phone: [phone] Fax: [phone]    Open 24 Hours?: No      Medication refill instructions:       If your prescription bottle indicates you have medication refills left, it is not necessary to call your provider’s office. Please contact your pharmacy and they will refill your medication.    If your prescription bottle indicates you do not have any refills left, you may request refills at any time through one of the following  ways: The online [employer] system (except Urgent Care), by calling your provider’s office, or by asking your pharmacy to contact your provider’s office with a refill request. Medication refills are processed only during regular business hours and may not be available until the next business day. Your provider may request additional information or to have a follow-up visit with you prior to refilling your medication.   *Please Note: Medication refills are assigned a new Rx number when refilled electronically. Your pharmacy may indicate that no refills were authorized even though a new prescription for the same medication is available at the pharmacy. Please request the medicine by name with the pharmacy before contacting your provider for a refill.        Your To Do List     Future Labs/Procedures Complete By Expires    Echocardiogram Comp w/o Cont  As directed 3/16/2018    Scheduling Instructions:    Within the next couple of months         [employer] Access Code: [email or web address]  Expires: 4/8/2017  9:40 AM    [employer]  A secure, online tool to manage your health information     [employer]’s [employer]® is a secure, online tool that connects you to your personalized health information from the privacy of your home -- day or night - making it very easy for you to manage your healthcare. Once the activation process is completed, you can even access your medical information using the [employer] carmelo, which is available for free in the Apple Carmelo store or Google Play store.     [employer] provides the following levels of access (as shown below):   My Chart Features   [facility]  Specialists [facility]  Urgent  Care [facility]  Primary Care  Doctor   Email your healthcare team securely and privately 24/7 X X X    Manage appointments: schedule your next appointment; view details of past/upcoming appointments X      Request prescription refills. X      View recent personal medical records, including lab and  immunizations X X X X   View health record, including health history, allergies, medications X X X X   Read reports about your outpatient visits, procedures, consult and ER notes X X X X   See your discharge summary, which is a recap of your hospital and/or ER visit that includes your diagnosis, lab results, and care plan. X X       How to register for [employer]:  1. Go to  [email or web address].  2. Click on the Sign Up Now box, which takes you to the New Member Sign Up page. You will need to provide the following information:  a. Enter your [employer] Access Code exactly as it appears at the top of this page. (You will not need to use this code after you’ve completed the sign-up process. If you do not sign up before the expiration date, you must request a new code.)   b. Enter your date of birth.   c. Enter your home email address.   d. Click Submit, and follow the next screen’s instructions.  3. Create a [employer] ID. This will be your [employer] login ID and cannot be changed, so think of one that is secure and easy to remember.  4. Create a [employer] password. You can change your password at any time.  5. Enter your Password Reset Question and Answer. This can be used at a later time if you forget your password.   6. Enter your e-mail address. This allows you to receive e-mail notifications when new information is available in [employer].  7. Click Sign Up. You can now view your health information.    For assistance activating your [employer] account, call [phone]

## 2017-03-16 NOTE — PROGRESS NOTES
"Subjective:   Parmjit Castelan is a 76 y.o. male who presents today followup of his hypertension. He also was noted to have coronary calcification on his cardiac CT in 2010. He underwent a myocardial perfusion scan December 2014 which was unremarkable. He does have end-stage renal disease and is on chronic hemodialysis.    Couple months ago he underwent PCI to the left lower extremity. Since that time he has had some increase in edema. This is dependent in nature. He also notes that it usually gets better with dialysis though since angioplasty hasn't improved as much. He needs precancerous intervention of the right lower extremity also.    He's noted some increasing dyspnea on exertion has dyspnea at about 2 blocks. He has had no PND or orthopnea. He's noted no palpitations or lightheadedness. He does snore and has been having difficulty with increasing fatigue. He is noted to chest discomfort.    Past Medical History   Diagnosis Date   • BPH 7/14/2009   • HTN (hypertension) 1/15/2010   • Snoring    • COPD (chronic obstructive pulmonary disease) (CMS-HCC)    • Proteinuria    • COPD (chronic obstructive pulmonary disease) (CMS-HCC) 8/2/2011   • EMPHYSEMA    • Detached retina    • CKD (chronic kidney disease) stage 4, GFR 15-29 ml/min (CMS-HCC) 1/15/2010     end stage renal disease   • Kidney cyst    • On supplemental oxygen therapy    • Heart burn      occas   • Indigestion      occas   • Bronchitis 1/1/13   • CAD (coronary artery disease) 2/20/2014   • Sleep apnea      O2 PER CANULA  AT NIGHT 2l/m   • Dialysis      m,w,f davita   • CATARACT      sanjuanita surgery complete   • Basal cell carcinoma of left cheek 3/26/2015   • Leg pain, bilateral 8/10/2015   • Gout    • Pneumonia    • Anesthesia      \"needs more sedation\" (can sometimes hear MD during procedure)    • High cholesterol      Past Surgical History   Procedure Laterality Date   • Cataract phaco with iol  4/8/08     Performed by STACEY PHILLIPS at SURGERY SAME DAY " [facility] ORS   • Av fistula creation  2/12/2010     Performed by DAVID CASON at SURGERY [facility] ORS   • Av fistula revision  2/19/2010     Performed by WILLIE HATCH at SURGERY [facility] ORS   • Av fistulogram  7/23/2010     Performed by DAVID CASON at SURGERY [facility] ORS   • Angioplasty balloon  7/23/2010     Performed by DAVID CASON at SURGERY [facility] ORS   • Av fistulogram  9/17/2010     Performed by DAVID CASON at SURGERY [facility] ORS   • Angioplasty balloon  9/17/2010     Performed by DAVID CASON at SURGERY [facility] ORS   • Vitrectomy posterior  1/18/2011     Performed by NAHUM GE at SURGERY SAME DAY [facility] ORS   • Scleral buckling  1/18/2011     Performed by NAHUM GE at SURGERY SAME DAY [facility] ORS   • Recovery  8/12/2011     Performed by SURGERY, IR-RECOVERY at SURGERY SAME DAY [facility] ORS   • Vitrectomy posterior  10/11/2011     Performed by NAHUM GE at SURGERY SAME DAY [facility] ORS   • Recovery  7/23/2012     Performed by SURGERY, IR-RECOVERY at SURGERY SAME DAY [facility] ORS   • Recovery  1/29/2013     Performed by Ir-Recovery Surgery at SURGERY SAME DAY [facility] ORS   • Recovery  7/2/2013     Performed by Ir-Recovery Surgery at SURGERY SAME DAY [facility] ORS   • Av fistula thrombolysis  7/2/2013     Performed by David Cason M.D. at SURGERY [facility]   • Recovery  12/17/2013     Performed by Ir-Recovery Surgery at SURGERY SAME DAY [facility] ORS   • Recovery  3/24/2014     Performed by Ir-Recovery Surgery at SURGERY [facility]   • Recovery  7/29/2014     Performed by Ir-Recovery Surgery at SURGERY SAME DAY [facility] ORS   • Recovery  3/24/2015     Performed by Recoveryonly Surgery at SURGERY PRE-POST PROC UNIT [facility]   • Recovery  12/23/2015     Procedure: VASCULAR CASE-BLANKA-LEFT ARM FISTULOGRAM WITH ANGIOPLASTY;  Surgeon: Recoveryonly Surgery;  Location: SURGERY PRE-POST PROC UNIT [facility];  Service:    •  Gastroscopy with biopsy  8/13/2016     Procedure: GASTROSCOPY WITH BIOPSY;  Surgeon: Jorge Leavitt M.D.;  Location: ENDOSCOPY [facility]  Service:    • Colonoscopy - endo  8/15/2016     Procedure: COLONOSCOPY - ENDO;  Surgeon: Mane Whatley M.D.;  Location: ENDOSCOPY [facility]  Service:      Family History   Problem Relation Age of Onset   • Lung Disease Father    • Genitourinary () Maternal Aunt      hematuria   • Stroke Mother    • Hypertension Mother    • Hypertension Brother      History   Smoking status   • Former Smoker -- 1.00 packs/day for 40 years   • Types: Cigarettes   • Quit date: 01/01/2009   Smokeless tobacco   • Never Used     Comment: 1 pk a day for 35 yrs, QUIT JAN 1 2010     Allergies   Allergen Reactions   • Betadine [Povidone Iodine] Rash and Itching     Per patient     Outpatient Encounter Prescriptions as of 3/16/2017   Medication Sig Dispense Refill   • B Complex-C-Folic Acid (DIALYVITE TABLET) Tab TAKE ONE TABLET BY MOUTH DAILY 90 Tab 4   • famotidine (PEPCID) 20 MG Tab Take 20 mg by mouth 2 times a day.     • Calcium Acetate, Phos Binder, 667 MG Cap TAKE TWO CAPSULES BY MOUTH THREE TIMES A DAY WITH A MEAL 180 Cap 11   • labetalol (NORMODYNE) 100 MG Tab Take 5 Tabs by mouth 2 times a day. 300 Tab 6   • fluticasone (FLONASE) 50 MCG/ACT nasal spray PLACE ONE TO TWO SPRAYS IN EACH NOSTRIL EVERY DAY 1 Bottle 5   • omeprazole (PRILOSEC) 40 MG delayed-release capsule TAKE ONE CAPSULE BY MOUTH DAILY (GENERIC FOR PRILOSEC) 90 Cap 4   • epoetin hilario (EPOGEN,PROCRIT) 2000 UNIT/ML Solution Inject 1 mL as instructed every Monday, Wednesday, and Friday. 1 mL 0   • pravastatin (PRAVACHOL) 10 MG Tab Take 10 mg by mouth every evening.     • minoxidil (LONITEN) 2.5 MG Tab Take 2.5 mg by mouth every day.     • amlodipine (NORVASC) 10 MG Tab TAKE ONE TABLET BY MOUTH DAILY 90 Tab 4   • ondansetron (ZOFRAN ODT) 4 MG TABLET DISPERSIBLE DISSOLVE ONE TABLET BY MOUTH EVERY 6 HOURS AS NEEDED FOR  "NAUSEA 30 Tab 4   • budesonide-formoterol (SYMBICORT) 160-4.5 MCG/ACT Aerosol Inhale 2 Puffs by mouth 2 Times a Day. Inhale 2 puffs by mouth twice daily. Rinse mouth after each use. 1 Inhaler 12   • lisinopril (PRINIVIL, ZESTRIL) 40 MG tablet TAKE ONE TABLET BY MOUTH TWICE A DAY 60 Tab 11   • trazodone (DESYREL) 150 MG Tab TAKE TWO TABLETS BY MOUTH EVERY NIGHT AT BEDTIME (GENERIC FOR DESYREL) 180 Tab 4   • furosemide (LASIX) 40 MG Tab TAKE TWO TABLETS BY MOUTH TWICE A  Tab 9   • ipratropium-albuterol (COMBIVENT RESPIMAT)  MCG/ACT AERS Inhale 1 Puff by mouth 4 times a day. As needed for cough (Patient taking differently: Inhale 1 Puff by mouth as needed. As needed for cough) 1 Inhaler 1   • tamsulosin (FLOMAX) 0.4 MG capsule TAKE TWO CAPSULES BY MOUTH DAILY 1/2 HOUR AFTER BREAKFAST 180 Cap 11   • doxazosin (CARDURA) 4 MG Tab Take 1 Tab by mouth every day. 90 Tab 3   • oxycodone-acetaminophen (PERCOCET) 5-325 MG Tab 1 tab po bid prn severe pain NO ALCOHOL and NO DRIVING within 24 hrs of taking this medication, NO BENZODIAZEPINE MEDICATIONS, no selling or giving to any other persons (Patient taking differently: 1 Tab at bedtime as needed. 1 tab po bid prn severe pain NO ALCOHOL and NO DRIVING within 24 hrs of taking this medication, NO BENZODIAZEPINE MEDICATIONS, no selling or giving to any other persons) 60 Tab 0     No facility-administered encounter medications on file as of 3/16/2017.     ROS       Objective:   /50 mmHg  Pulse 76  Ht 1.74 m (5' 8.5\")  Wt 73.483 kg (162 lb)  BMI 24.27 kg/m2  SpO2 91%    Physical Exam   Neck: No JVD present.   Cardiovascular: Normal rate and regular rhythm.  Exam reveals no gallop.    Murmur (grade 3/6 systolic at the base and grade 2-3/6 systolic at the apex.) heard.  Pulmonary/Chest: Effort normal. He has no rales.   Abdominal: Soft. There is no tenderness.   Musculoskeletal: He exhibits edema (1+).     Lab Results   Component Value Date/Time    " CHOLESTEROL,TOT 92* 03/09/2017 09:05 AM    LDL 27 03/09/2017 09:05 AM    HDL 60 03/09/2017 09:05 AM    TRIGLYCERIDES 27 03/09/2017 09:05 AM       Lab Results   Component Value Date/Time    SODIUM 142 03/09/2017 09:07 AM    POTASSIUM 4.4 03/09/2017 09:07 AM    CHLORIDE 98 03/09/2017 09:07 AM    CO2 35* 03/09/2017 09:07 AM    GLUCOSE 92 03/09/2017 09:07 AM    BUN 23* 03/09/2017 09:07 AM    CREATININE 6.09* 03/09/2017 09:07 AM     Lab Results   Component Value Date/Time    ALKALINE PHOSPHATASE 93 03/09/2017 09:05 AM    AST(SGOT) 23 03/09/2017 09:05 AM    ALT(SGPT) 16 03/09/2017 09:05 AM    TOTAL BILIRUBIN 0.7 03/09/2017 09:05 AM      Echocardiogram:  CONCLUSIONS  Normal left ventricular chamber size.  Left ventricular ejection fraction is visually estimated to be 5%.  Mild mitral regurgitation.  Moderately dilated left atrium.  Aortic sclerosis without stenosis.  Mild tricuspid regurgitation.  Right ventricular systolic pressure is estimated to be 52-57 mmHg.  Dilated inferior vena cava with partial inspiratory collapse.  The right ventricle was normal in size and function.  Small pericardial effusion without evidence of hemodynamic compromise.    Exam Date:         05/09/2016         Assessment:     1. Essential hypertension     2. Elevated coronary artery calcium score     3. Dyslipidemia         Medical Decision Making:  Today's Assessment / Status / Plan:     Mr. Castelan is having difficulty with dyspnea on exertion. He'll be further evaluated with an echocardiogram. In addition, he does have increasing fatigue and he does snore. He has a history of obstructive sleep apnea but does not use CPAP. I feel he should discuss this with his pulmonologist. His lipid status is under excellent control on low-dose statin therapy. His blood pressure is also under fairly good control. Given the low diastolic blood pressure, we will not increase his medical therapy. He will follow-up in about 3 months.

## 2017-03-20 VITALS
BODY MASS INDEX: 28.04 KG/M2 | DIASTOLIC BLOOD PRESSURE: 62 MMHG | TEMPERATURE: 98.6 F | RESPIRATION RATE: 16 BRPM | HEART RATE: 74 BPM | SYSTOLIC BLOOD PRESSURE: 150 MMHG | HEIGHT: 68 IN | WEIGHT: 185 LBS

## 2017-03-21 ENCOUNTER — APPOINTMENT (OUTPATIENT)
Dept: RADIOLOGY | Facility: IMAGING CENTER | Age: 77
End: 2017-03-21
Attending: NURSE PRACTITIONER
Payer: MEDICARE

## 2017-03-21 ENCOUNTER — OFFICE VISIT (OUTPATIENT)
Dept: PULMONOLOGY | Facility: HOSPICE | Age: 77
End: 2017-03-21
Payer: MEDICARE

## 2017-03-21 VITALS
OXYGEN SATURATION: 93 % | WEIGHT: 164 LBS | SYSTOLIC BLOOD PRESSURE: 132 MMHG | RESPIRATION RATE: 16 BRPM | HEIGHT: 69 IN | DIASTOLIC BLOOD PRESSURE: 76 MMHG | HEART RATE: 73 BPM | TEMPERATURE: 98.6 F | BODY MASS INDEX: 24.29 KG/M2

## 2017-03-21 DIAGNOSIS — R06.09 DYSPNEA ON EXERTION: ICD-10-CM

## 2017-03-21 DIAGNOSIS — Z99.2 ESRD (END STAGE RENAL DISEASE) ON DIALYSIS (HCC): ICD-10-CM

## 2017-03-21 DIAGNOSIS — N18.6 ESRD (END STAGE RENAL DISEASE) ON DIALYSIS (HCC): ICD-10-CM

## 2017-03-21 DIAGNOSIS — J42 CHRONIC BRONCHITIS, UNSPECIFIED CHRONIC BRONCHITIS TYPE (HCC): ICD-10-CM

## 2017-03-21 DIAGNOSIS — J18.9 PNEUMONIA DUE TO INFECTIOUS ORGANISM, UNSPECIFIED LATERALITY, UNSPECIFIED PART OF LUNG: ICD-10-CM

## 2017-03-21 DIAGNOSIS — R60.0 PEDAL EDEMA: ICD-10-CM

## 2017-03-21 DIAGNOSIS — G47.34 NOCTURNAL HYPOXEMIA: ICD-10-CM

## 2017-03-21 DIAGNOSIS — I27.20 PULMONARY HYPERTENSION (HCC): ICD-10-CM

## 2017-03-21 PROCEDURE — G8420 CALC BMI NORM PARAMETERS: HCPCS | Performed by: NURSE PRACTITIONER

## 2017-03-21 PROCEDURE — 1036F TOBACCO NON-USER: CPT | Performed by: NURSE PRACTITIONER

## 2017-03-21 PROCEDURE — 3017F COLORECTAL CA SCREEN DOC REV: CPT | Performed by: NURSE PRACTITIONER

## 2017-03-21 PROCEDURE — 71020 DX-CHEST-2 VIEWS: CPT | Mod: TC | Performed by: NURSE PRACTITIONER

## 2017-03-21 PROCEDURE — G8484 FLU IMMUNIZE NO ADMIN: HCPCS | Performed by: NURSE PRACTITIONER

## 2017-03-21 PROCEDURE — 1101F PT FALLS ASSESS-DOCD LE1/YR: CPT | Performed by: NURSE PRACTITIONER

## 2017-03-21 PROCEDURE — 99214 OFFICE O/P EST MOD 30 MIN: CPT | Performed by: NURSE PRACTITIONER

## 2017-03-21 PROCEDURE — G8432 DEP SCR NOT DOC, RNG: HCPCS | Performed by: NURSE PRACTITIONER

## 2017-03-21 PROCEDURE — 4040F PNEUMOC VAC/ADMIN/RCVD: CPT | Performed by: NURSE PRACTITIONER

## 2017-03-21 PROCEDURE — G8599 NO ASA/ANTIPLAT THER USE RNG: HCPCS | Performed by: NURSE PRACTITIONER

## 2017-03-21 NOTE — MR AVS SNAPSHOT
"aPrmjit Castelan   3/21/2017 3:00 PM   Office Visit   MRN: [id number]    Department:  [facility]   Dept Phone:  [phone]    Description:  Male : 1940   Provider:  ILAN Valdes           Reason for Visit     Follow-Up CXR      Allergies as of 3/21/2017     Allergen Noted Reactions    Betadine [Povidone Iodine] 2016   Rash, Itching    Per patient      You were diagnosed with     Chronic bronchitis, unspecified chronic bronchitis type (CMS-HCC)   [9625591]       Pulmonary hypertension (CMS-HCC)   [121553]       Pneumonia due to infectious organism, unspecified laterality, unspecified part of lung   [1448735]       Nocturnal hypoxemia   [302703]       ESRD (end stage renal disease) on dialysis (CMS-HCC)   [015975]       Pedal edema   [870999]         Vital Signs     Blood Pressure Pulse Temperature Respirations Height Weight    132/76 mmHg 73 37 °C (98.6 °F) 16 1.74 m (5' 8.5\") 74.39 kg (164 lb)    Body Mass Index Oxygen Saturation Smoking Status             24.57 kg/m2 93% Former Smoker         Basic Information     Date Of Birth Sex Race Ethnicity Preferred Language    1940 Male White Non- English      Your appointments     2017  4:15 PM   ECHO with [facility] EXAM 10   [place]    [address] [facility]  [place] [address]   [phone]           No prep            May 16, 2017  2:00 PM   Pulmonary Function Test with PFT-3   [facility] (--)    [address]  [address]  [place] [id number] [phone]            May 16, 2017  3:00 PM   Established Patient Pul with ILAN Valdes   [facility] (--)    [address]  [address]  [place] [id number] [phone]            2017  1:00 PM   FOLLOW UP with James Mendoza M.D.   [facility] (--)    [address]  [place] [id number] [phone]              Problem " List              ICD-10-CM Priority Class Noted - Resolved    BPH (benign prostatic hypertrophy) N40.0 Low  7/14/2009 - Present    Essential hypertension I10 High  1/15/2010 - Present    Hard of hearing H91.90   4/22/2011 - Present    Preventative health care Z00.00   4/22/2011 - Present    HELGA (obstructive sleep apnea) G47.33   5/13/2011 - Present    COPD (chronic obstructive pulmonary disease) (CMS-HCC) J44.9   8/2/2011 - Present    BPH (benign prostatic hyperplasia) N40.0   1/13/2012 - Present    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   12/14/2012 - Present    Kidney damage S37.009A   1/29/2013 - Present    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   7/2/2013 - Present    Elevated coronary artery calcium score I25.10 High  2/20/2014 - Present    End stage renal disease (HCC) N18.6   3/24/2014 - Present    Dyslipidemia E78.5 Low  12/5/2014 - Present    Pigmented skin lesion L81.9   3/18/2015 - Present    Basal cell carcinoma of left cheek C44.319   3/26/2015 - Present    Anemia in CKD (chronic kidney disease) N18.9, D63.1   5/29/2015 - Present    Leg pain, bilateral M79.604, M79.605   8/10/2015 - Present    Primary osteoarthritis of both hands M19.041, M19.042   8/20/2015 - Present    Chronic gout M1A.9XX0   1/7/2016 - Present    Respiratory failure (CMS-HCC) J96.90   5/8/2016 - Present    Hyperkalemia, diminished renal excretion E87.5   5/8/2016 - Present    Pneumonia J18.9   5/8/2016 - Present    Pulmonary edema J81.1   5/8/2016 - Present    Anemia D64.9   5/8/2016 - Present    Leukocytosis D72.829   5/8/2016 - Present    COPD exacerbation (CMS-HCC) J44.1   5/8/2016 - Present    ESRD (end stage renal disease) on dialysis (CMS-HCC) N18.6, Z99.2   5/8/2016 - Present    Fluid overload E87.70   5/8/2016 - Present    Systolic CHF, acute (CMS-HCC) I50.21   5/11/2016 - Present    HTN (hypertension), malignant I10   5/11/2016 - Present    Bacteremia due to Staphylococcus aureus R78.81   5/11/2016 - Present       Hyperkalemia E87.5   5/11/2016 - Present    Renal cyst N28.1   5/11/2016 - Present    ESRD (end stage renal disease) (CMS-HCC) N18.6   8/12/2016 - Present    GIB (gastrointestinal bleeding) K92.2 High  8/12/2016 - Present    Hypotension I95.9 Medium  8/13/2016 - Present    Need for prophylactic vaccination with combined vaccine Z23   9/8/2016 - Present    AVM (arteriovenous malformation) Q27.30   9/8/2016 - Present    Dyspnea on exertion R06.09   3/16/2017 - Present    Nocturnal hypoxemia G47.34   3/21/2017 - Present    Pulmonary hypertension (CMS-HCC) I27.2   3/21/2017 - Present    Pedal edema R60.0   3/21/2017 - Present      Health Maintenance        Date Due Completion Dates    IMM DTaP/Tdap/Td Vaccine (1 - Tdap) 7/30/1961 ---    IMM ZOSTER VACCINE 7/30/2002 ---    IMM INFLUENZA (1) 9/1/2016 10/1/2013    IMM PNEUMOCOCCAL 65+ (ADULT) HIGH/HIGHEST RISK SERIES (2 of 2 - PCV13) 9/8/2017 9/8/2016    COLONOSCOPY 8/15/2026 8/15/2016, 9/16/2013            Current Immunizations     Influenza TIV (IM) 10/1/2013    Pneumococcal polysaccharide vaccine (PPSV-23) 9/8/2016      Below and/or attached are the medications your provider expects you to take. Review all of your home medications and newly ordered medications with your provider and/or pharmacist. Follow medication instructions as directed by your provider and/or pharmacist. Please keep your medication list with you and share with your provider. Update the information when medications are discontinued, doses are changed, or new medications (including over-the-counter products) are added; and carry medication information at all times in the event of emergency situations     Allergies:  BETADINE - Rash,Itching               Medications  Valid as of: March 21, 2017 -  3:50 PM    Generic Name Brand Name Tablet Size Instructions for use    AmLODIPine Besylate (Tab) NORVASC 10 MG TAKE ONE TABLET BY MOUTH DAILY        B Complex-C-Folic Acid (Tab) DIALYVITE TABLET  TAKE ONE  TABLET BY MOUTH DAILY        Budesonide-Formoterol Fumarate (Aerosol) SYMBICORT 160-4.5 MCG/ACT Inhale 2 Puffs by mouth 2 Times a Day. Inhale 2 puffs by mouth twice daily. Rinse mouth after each use.        Calcium Acetate (Phos Binder) (Cap) Calcium Acetate (Phos Binder) 667 MG TAKE TWO CAPSULES BY MOUTH THREE TIMES A DAY WITH A MEAL        Doxazosin Mesylate (Tab) CARDURA 4 MG Take 1 Tab by mouth every day.        Epoetin Mitchel (Solution) EPOGEN,PROCRIT 2000 UNIT/ML Inject 1 mL as instructed every Monday, Wednesday, and Friday.        Famotidine (Tab) PEPCID 20 MG Take 20 mg by mouth 2 times a day.        Fluticasone Propionate (Suspension) FLONASE 50 MCG/ACT PLACE ONE TO TWO SPRAYS IN EACH NOSTRIL EVERY DAY        Furosemide (Tab) LASIX 40 MG TAKE TWO TABLETS BY MOUTH TWICE A DAY        Ipratropium-Albuterol (Aero Soln) COMBIVENT RESPIMAT  MCG/ACT Inhale 1 Puff by mouth 4 times a day. As needed for cough        Labetalol HCl (Tab) NORMODYNE 100 MG Take 5 Tabs by mouth 2 times a day.        Lisinopril (Tab) PRINIVIL, ZESTRIL 40 MG TAKE ONE TABLET BY MOUTH TWICE A DAY        Minoxidil (Tab) LONITEN 2.5 MG Take 2.5 mg by mouth every day.        Omeprazole (CAPSULE DELAYED RELEASE) PRILOSEC 40 MG TAKE ONE CAPSULE BY MOUTH DAILY (GENERIC FOR PRILOSEC)        Ondansetron (TABLET DISPERSIBLE) ZOFRAN ODT 4 MG DISSOLVE ONE TABLET BY MOUTH EVERY 6 HOURS AS NEEDED FOR NAUSEA        Oxycodone-Acetaminophen (Tab) PERCOCET 5-325 MG 1 tab po bid prn severe pain NO ALCOHOL and NO DRIVING within 24 hrs of taking this medication, NO BENZODIAZEPINE MEDICATIONS, no selling or giving to any other persons        Pravastatin Sodium (Tab) PRAVACHOL 10 MG Take 10 mg by mouth every evening.        Tamsulosin HCl (Cap) FLOMAX 0.4 MG TAKE TWO CAPSULES BY MOUTH DAILY 1/2 HOUR AFTER BREAKFAST        TraZODone HCl (Tab) DESYREL 150 MG TAKE TWO TABLETS BY MOUTH EVERY NIGHT AT BEDTIME (GENERIC FOR DESYREL)        .                 Medicines  prescribed today were sent to:     [facility][id number] - [place], [place] - [address]    [address] [facility] [facility] [address] [place] [address]    Phone: [phone] Fax: [phone]    Open 24 Hours?: No      Medication refill instructions:       If your prescription bottle indicates you have medication refills left, it is not necessary to call your provider’s office. Please contact your pharmacy and they will refill your medication.    If your prescription bottle indicates you do not have any refills left, you may request refills at any time through one of the following ways: The online What[employer] system (except Urgent Care), by calling your provider’s office, or by asking your pharmacy to contact your provider’s office with a refill request. Medication refills are processed only during regular business hours and may not be available until the next business day. Your provider may request additional information or to have a follow-up visit with you prior to refilling your medication.   *Please Note: Medication refills are assigned a new Rx number when refilled electronically. Your pharmacy may indicate that no refills were authorized even though a new prescription for the same medication is available at the pharmacy. Please request the medicine by name with the pharmacy before contacting your provider for a refill.        Your To Do List     Future Labs/Procedures Complete By Expires    AMB PULMONARY FUNCTION TEST/LAB  As directed 3/21/2018    Comments:    At next OV    OVERNIGHT OXIMETRY  As directed 3/21/2018    Comments:    2L, now         What[employer]on Access Code: [email or web address]  Expires: 4/8/2017  9:40 AM    What[employer]on  A secure, online tool to manage your health information     [employer]’s WhatSalon® is a secure, online tool that connects you to your personalized health information from the privacy of your home -- day or night - making it very easy for you to manage your healthcare. Once the activation process is  completed, you can even access your medical information using the [employer] carmelo, which is available for free in the Apple Carmelo store or Google Play store.     [employer] provides the following levels of access (as shown below):   My Chart Features   [facility] Primary [facility] Doctor [facility]  Specialists [facility]  Urgent  Care Non-[facility]  Primary Care  Doctor   Email your healthcare team securely and privately 24/7 X X X    Manage appointments: schedule your next appointment; view details of past/upcoming appointments X      Request prescription refills. X      View recent personal medical records, including lab and immunizations X X X X   View health record, including health history, allergies, medications X X X X   Read reports about your outpatient visits, procedures, consult and ER notes X X X X   See your discharge summary, which is a recap of your hospital and/or ER visit that includes your diagnosis, lab results, and care plan. X X       How to register for [employer]:  1. Go to  [email or web address].  2. Click on the Sign Up Now box, which takes you to the New Member Sign Up page. You will need to provide the following information:  a. Enter your [employer] Access Code exactly as it appears at the top of this page. (You will not need to use this code after you’ve completed the sign-up process. If you do not sign up before the expiration date, you must request a new code.)   b. Enter your date of birth.   c. Enter your home email address.   d. Click Submit, and follow the next screen’s instructions.  3. Create a [employer] ID. This will be your [employer] login ID and cannot be changed, so think of one that is secure and easy to remember.  4. Create a [employer] password. You can change your password at any time.  5. Enter your Password Reset Question and Answer. This can be used at a later time if you forget your password.   6. Enter your e-mail address. This allows you to receive e-mail notifications when new information is  available in [employer].  7. Click Sign Up. You can now view your health information.    For assistance activating your [employer] account, call [phone]

## 2017-03-21 NOTE — PROGRESS NOTES
Chief Complaint   Patient presents with   • Follow-Up     CXR       HPI:  Parmjit Castelan is a 76 y.o. year old male here today for follow-up on increased dyspnea, history of HELGA and pedal edema. History of ESRD and tobacco use 40 pack years; quit 2009. Last OV was 6/18/16 with Dr. Shah. He was recently seen by Dr. Mendoza Cardiology for hypertension. He had PCI performed to LLE and resultant pedal edema. He has noted increased dyspnea. CXR 3/21/17 indicated no acute process, no pleural effusion but enlarged cardiac silhouette. Cardiology ordered ECHO to be completed. He has been followed for COPD/chronic bronchitis. Last PFT 3/22/13 indicate FEV1 1.93 L or 64% predicted, FEV1/FVC ratio 74, % predicted, TLC 98% predicted, DLCO 120% predicted. Patient did have significant bronchodilator response. Patient is compliant on Symbicort 160/4.5 µg 2 puffs twice a day. He also has, Combivent Respimat on hand that he does not use. BMI 24.    Dr. Mendoza referred him back to us to assess possible history of sleep apnea and pedal edema. Patient currently uses 2L oxygen at night. His wife reports him having sleep study 7 years ago and he was started on CPAP - I do not have this report. He was intolerant to therapy and placed on oxygen. We reviewed the pathophysiology of sleep apnea versus hypoxemia. We will check his current oxygen levels and further assess from there.     He notes that since PCI on LLE by Dr. Cason, he has had increased pedal edema that gets greater through the day but resolves once he sleeps and wakes in the AM. He notes poor sleep and moving frequently at night and not always keeping his oxygen on. 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 He uses lasix daily and famotidine QD. He was advised to get compression stockings, but has not. I reviewed the potential of Percocet to worsen his sleep symptoms/RLS. He denies fever, chills, night sweats, chest  "pain, or hemoptysis. He notes dyspnea when over exerting himself such as inclines/stairs. He notes routine AM cough but no phlegm production. He denies wheezing. He notes constant fatigue.      ROS: As per HPI and otherwise negative if not stated.    Past Medical History   Diagnosis Date   • BPH 7/14/2009   • HTN (hypertension) 1/15/2010   • Snoring    • COPD (chronic obstructive pulmonary disease) (CMS-HCC)    • Proteinuria    • COPD (chronic obstructive pulmonary disease) (CMS-HCC) 8/2/2011   • EMPHYSEMA    • Detached retina    • CKD (chronic kidney disease) stage 4, GFR 15-29 ml/min (CMS-HCC) 1/15/2010     end stage renal disease   • Kidney cyst    • On supplemental oxygen therapy    • Heart burn      occas   • Indigestion      occas   • Bronchitis 1/1/13   • CAD (coronary artery disease) 2/20/2014   • Sleep apnea      O2 PER CANULA  AT NIGHT 2l/m   • Dialysis      m,w,f [facility]   • CATARACT      sanju[facility] surgery complete   • Basal cell carcinoma of left cheek 3/26/2015   • Leg pain, bilateral 8/10/2015   • Gout    • Pneumonia    • Anesthesia      \"needs more sedation\" (can sometimes hear MD during procedure)    • High cholesterol        Past Surgical History   Procedure Laterality Date   • Cataract phaco with iol  4/8/08     Performed by STACEY PHILLIPS at SURGERY SAME DAY [facility]   • Av fistula creation  2/12/2010     Performed by ALESHIA MOSCOSO at SURGERY [facility]   • Av fistula revision  2/19/2010     Performed by WILLIE HATCH at SURGERY [facility]   • Av fistulogram  7/23/2010     Performed by ALESHIA MOSCOSO at SURGERY [facility]   • Angioplasty balloon  7/23/2010     Performed by ALESHIA MOSCOSO at SURGERY [facility]   • Av fistulogram  9/17/2010     Performed by ALESHIA MOSCOSO at SURGERY [facility]   • Angioplasty balloon  9/17/2010     Performed by ALESHIA MOSCOSO at SURGERY [facility]   • Vitrectomy posterior  1/18/2011     Performed by LUMA" NAHUM OSEI at SURGERY SAME DAY [facility]   • Scleral buckling  1/18/2011     Performed by NAHUM GE at SURGERY SAME DAY [facility]   • Recovery  8/12/2011     Performed by SURGERY, IR-RECOVERY at SURGERY SAME DAY [facility]   • Vitrectomy posterior  10/11/2011     Performed by NAHUM GE at SURGERY SAME DAY [facility]   • Recovery  7/23/2012     Performed by SURGERY, IR-RECOVERY at SURGERY SAME DAY [facility]   • Recovery  1/29/2013     Performed by Ir-Recovery Surgery at SURGERY SAME DAY [facility]   • Recovery  7/2/2013     Performed by Ir-Recovery Surgery at SURGERY SAME DAY [facility]   • Av fistula thrombolysis  7/2/2013     Performed by David Cason M.D. at SURGERY [facility]   • Recovery  12/17/2013     Performed by Ir-Recovery Surgery at SURGERY SAME DAY [facility]   • Recovery  3/24/2014     Performed by Ir-Recovery Surgery at SURGERY [facility]   • Recovery  7/29/2014     Performed by Ir-Recovery Surgery at SURGERY SAME DAY [facility]   • Recovery  3/24/2015     Performed by [facility] at SURGERY PRE-POST PROC UNIT [facility]   • Recovery  12/23/2015     Procedure: VASCULAR CASE-[place]-LEFT ARM FISTULOGRAM WITH ANGIOPLASTY;  Surgeon: [facility];  Location: SURGERY PRE-POST PROC UNIT [facility];  Service:    • Gastroscopy with biopsy  8/13/2016     Procedure: GASTROSCOPY WITH BIOPSY;  Surgeon: Jorge Leavitt M.D.;  Location: [facility];  Service:    • Colonoscopy - endo  8/15/2016     Procedure: COLONOSCOPY - ENDO;  Surgeon: Mane Whatley M.D.;  Location: [facility];  Service:        Family History   Problem Relation Age of Onset   • Lung Disease Father      Emphysema, resp failure   • Genitourinary () Maternal Aunt      hematuria   • Stroke Mother    • Hypertension Mother    • Hypertension Brother        Social History     Social History   • Marital Status:      Spouse Name: N/A   • Number of Children: N/A   •  "Years of Education: N/A     Occupational History   • Not on file.     Social History Main Topics   • Smoking status: Former Smoker -- 1.00 packs/day for 40 years     Types: Cigarettes     Quit date: 01/01/2009   • Smokeless tobacco: Never Used      Comment: 1 pk a day for 35 yrs, QUIT JAN 1 2010   • Alcohol Use: No   • Drug Use: No   • Sexual Activity: Not on file     Other Topics Concern   • Not on file     Social History Narrative       Allergies as of 03/21/2017 - Jin as Reviewed 03/21/2017   Allergen Reaction Noted   • Betadine [povidone iodine] Rash and Itching 08/12/2016        @Vital signs for this encounter:  Filed Vitals:    03/21/17 1510   Height: 1.74 m (5' 8.5\")   Weight: 74.39 kg (164 lb)   Weight % change since last entry.: 0 %   BP: 132/76   Pulse: 73   BMI (Calculated): 24.57   Resp: 16   Temp: 37 °C (98.6 °F)   O2 sat % room air: 93 %       Current medications as of today   Current Outpatient Prescriptions   Medication Sig Dispense Refill   • B Complex-C-Folic Acid (DIALYVITE TABLET) Tab TAKE ONE TABLET BY MOUTH DAILY 90 Tab 4   • tamsulosin (FLOMAX) 0.4 MG capsule TAKE TWO CAPSULES BY MOUTH DAILY 1/2 HOUR AFTER BREAKFAST 180 Cap 11   • doxazosin (CARDURA) 4 MG Tab Take 1 Tab by mouth every day. 90 Tab 3   • Calcium Acetate, Phos Binder, 667 MG Cap TAKE TWO CAPSULES BY MOUTH THREE TIMES A DAY WITH A MEAL 180 Cap 11   • labetalol (NORMODYNE) 100 MG Tab Take 5 Tabs by mouth 2 times a day. 300 Tab 6   • fluticasone (FLONASE) 50 MCG/ACT nasal spray PLACE ONE TO TWO SPRAYS IN EACH NOSTRIL EVERY DAY 1 Bottle 5   • omeprazole (PRILOSEC) 40 MG delayed-release capsule TAKE ONE CAPSULE BY MOUTH DAILY (GENERIC FOR PRILOSEC) 90 Cap 4   • epoetin hilario (EPOGEN,PROCRIT) 2000 UNIT/ML Solution Inject 1 mL as instructed every Monday, Wednesday, and Friday. 1 mL 0   • pravastatin (PRAVACHOL) 10 MG Tab Take 10 mg by mouth every evening.     • minoxidil (LONITEN) 2.5 MG Tab Take 2.5 mg by mouth every day.     • " amlodipine (NORVASC) 10 MG Tab TAKE ONE TABLET BY MOUTH DAILY 90 Tab 4   • budesonide-formoterol (SYMBICORT) 160-4.5 MCG/ACT Aerosol Inhale 2 Puffs by mouth 2 Times a Day. Inhale 2 puffs by mouth twice daily. Rinse mouth after each use. 1 Inhaler 12   • lisinopril (PRINIVIL, ZESTRIL) 40 MG tablet TAKE ONE TABLET BY MOUTH TWICE A DAY 60 Tab 11   • trazodone (DESYREL) 150 MG Tab TAKE TWO TABLETS BY MOUTH EVERY NIGHT AT BEDTIME (GENERIC FOR DESYREL) 180 Tab 4   • furosemide (LASIX) 40 MG Tab TAKE TWO TABLETS BY MOUTH TWICE A  Tab 9   • famotidine (PEPCID) 20 MG Tab Take 20 mg by mouth 2 times a day.     • oxycodone-acetaminophen (PERCOCET) 5-325 MG Tab 1 tab po bid prn severe pain NO ALCOHOL and NO DRIVING within 24 hrs of taking this medication, NO BENZODIAZEPINE MEDICATIONS, no selling or giving to any other persons (Patient taking differently: 1 Tab at bedtime as needed. 1 tab po bid prn severe pain NO ALCOHOL and NO DRIVING within 24 hrs of taking this medication, NO BENZODIAZEPINE MEDICATIONS, no selling or giving to any other persons) 60 Tab 0   • ondansetron (ZOFRAN ODT) 4 MG TABLET DISPERSIBLE DISSOLVE ONE TABLET BY MOUTH EVERY 6 HOURS AS NEEDED FOR NAUSEA 30 Tab 4   • ipratropium-albuterol (COMBIVENT RESPIMAT)  MCG/ACT AERS Inhale 1 Puff by mouth 4 times a day. As needed for cough (Patient taking differently: Inhale 1 Puff by mouth as needed. As needed for cough) 1 Inhaler 1     No current facility-administered medications for this visit.         Physical Exam:   Gen:           Alert and oriented, No apparent distress. Mood and affect appropriate, normal interaction with examiner.  Eyes:          PERRL, EOM intact, sclere white, conjunctive moist.  Ears:          Not examined.   Hearing:     Grossly intact.  Nose:          Normal, no lesions or deformities.  Dentition:    Good dentition.  Oropharynx:   Tongue normal, posterior pharynx without erythema or exudate.  Mallampati Classification:  3  Neck:        Supple, trachea midline, no masses.  Respiratory Effort: No intercostal retractions or use of accessory muscles.   Lung Auscultation:      Clear to auscultation bilaterally; no rales, rhonchi or wheezing.  CV:            Regular rate and rhythm. No murmurs, rubs or gallops.  Abd:           Not examined.   Lymphadenopathy: Not examined.  Gait and Station: Normal.  Digits and Nails: No clubbing, cyanosis, petechiae, or nodes.   Cranial Nerves: II-XII grossly intact.  Skin:        No rashes, lesions or ulcers noted.               Ext:           No cyanosis but LLE pedal edema 1+ pitting.      Assessment:  1. Chronic bronchitis, unspecified chronic bronchitis type (CMS-[facility])     2. Pulmonary hypertension (CMS-HCC)     3. Pneumonia, history of     4. Nocturnal hypoxemia     5. ESRD (end stage renal disease) on dialysis (CMS-HCC)     6. Pedal edema         Immunizations:    Flu:not given  Pneumovax 23:2016  Prevnar 13:not given    Plan:  1. Continue inhaler regimen.  2. DME [facility]NO[facility] on 2L oxygen. May call results. Continue 2L oxygen nightly.  3. Discussed respiratory and sleep hygiene.  4. Advise obtaining compression stockings.  5. Encouraged routine walking.  6. ECHO to be completed per Cardio.  7. Follow up in 6 weeks with PFT/CNOX, sooner if needed.

## 2017-03-28 DIAGNOSIS — N18.6 END STAGE RENAL DISEASE (HCC): Primary | ICD-10-CM

## 2017-03-28 NOTE — TELEPHONE ENCOUNTER
Was the patient seen in the last year in this department? No  [facility] patient    Does patient have an active prescription for medications requested? Yes     Received Request Via: Pharmacy

## 2017-04-03 RX ORDER — ONDANSETRON 4 MG/1
TABLET, ORALLY DISINTEGRATING ORAL
Qty: 30 TAB | Refills: 4 | Status: ON HOLD | OUTPATIENT
Start: 2017-04-03 | End: 2018-03-20

## 2017-04-10 NOTE — TELEPHONE ENCOUNTER
Was the patient seen in the last year in this department? No  dialysis patient    Does patient have an active prescription for medications requested? unknown    Received Request Via: Pharmacy

## 2017-04-11 ENCOUNTER — HOSPITAL ENCOUNTER (OUTPATIENT)
Dept: CARDIOLOGY | Facility: MEDICAL CENTER | Age: 77
End: 2017-04-11
Attending: INTERNAL MEDICINE
Payer: MEDICARE

## 2017-04-11 DIAGNOSIS — R06.09 DYSPNEA ON EXERTION: ICD-10-CM

## 2017-04-11 DIAGNOSIS — I10 ESSENTIAL HYPERTENSION: ICD-10-CM

## 2017-04-11 LAB
LV EJECT FRACT  99904: 65
LV EJECT FRACT MOD 2C 99903: 69.38
LV EJECT FRACT MOD 4C 99902: 60.03
LV EJECT FRACT MOD BP 99901: 61.66

## 2017-04-11 PROCEDURE — 93306 TTE W/DOPPLER COMPLETE: CPT

## 2017-04-11 PROCEDURE — 93306 TTE W/DOPPLER COMPLETE: CPT | Mod: 26 | Performed by: INTERNAL MEDICINE

## 2017-04-12 RX ORDER — TRAZODONE HYDROCHLORIDE 150 MG/1
TABLET ORAL
Qty: 180 TAB | Refills: 4 | Status: SHIPPED | OUTPATIENT
Start: 2017-04-12 | End: 2018-04-25 | Stop reason: SDUPTHER

## 2017-04-15 ENCOUNTER — APPOINTMENT (OUTPATIENT)
Dept: RADIOLOGY | Facility: MEDICAL CENTER | Age: 77
DRG: 539 | End: 2017-04-15
Attending: EMERGENCY MEDICINE
Payer: MEDICARE

## 2017-04-15 ENCOUNTER — OFFICE VISIT (OUTPATIENT)
Dept: URGENT CARE | Facility: CLINIC | Age: 77
End: 2017-04-15
Payer: MEDICARE

## 2017-04-15 ENCOUNTER — HOSPITAL ENCOUNTER (INPATIENT)
Facility: MEDICAL CENTER | Age: 77
LOS: 4 days | DRG: 539 | End: 2017-04-20
Attending: EMERGENCY MEDICINE | Admitting: HOSPITALIST
Payer: MEDICARE

## 2017-04-15 ENCOUNTER — RESOLUTE PROFESSIONAL BILLING HOSPITAL PROF FEE (OUTPATIENT)
Dept: HOSPITALIST | Facility: MEDICAL CENTER | Age: 77
End: 2017-04-15
Payer: MEDICARE

## 2017-04-15 VITALS
TEMPERATURE: 100.9 F | WEIGHT: 164 LBS | DIASTOLIC BLOOD PRESSURE: 72 MMHG | BODY MASS INDEX: 24.86 KG/M2 | HEIGHT: 68 IN | SYSTOLIC BLOOD PRESSURE: 184 MMHG | OXYGEN SATURATION: 88 % | HEART RATE: 84 BPM | RESPIRATION RATE: 16 BRPM

## 2017-04-15 DIAGNOSIS — R50.9 FEVER, UNSPECIFIED FEVER CAUSE: ICD-10-CM

## 2017-04-15 DIAGNOSIS — Z99.2 DIALYSIS PATIENT (HCC): ICD-10-CM

## 2017-04-15 DIAGNOSIS — I31.39 PERICARDIAL EFFUSION: ICD-10-CM

## 2017-04-15 DIAGNOSIS — J44.9 CHRONIC OBSTRUCTIVE PULMONARY DISEASE, UNSPECIFIED COPD TYPE (HCC): ICD-10-CM

## 2017-04-15 DIAGNOSIS — I16.0 HYPERTENSIVE URGENCY: ICD-10-CM

## 2017-04-15 DIAGNOSIS — I10 ESSENTIAL HYPERTENSION: ICD-10-CM

## 2017-04-15 DIAGNOSIS — N18.6 ESRD (END STAGE RENAL DISEASE) (HCC): ICD-10-CM

## 2017-04-15 DIAGNOSIS — M54.2 NECK PAIN: ICD-10-CM

## 2017-04-15 LAB
ALBUMIN SERPL BCP-MCNC: 3.9 G/DL (ref 3.2–4.9)
ALBUMIN/GLOB SERPL: 1.1 G/DL
ALP SERPL-CCNC: 101 U/L (ref 30–99)
ALT SERPL-CCNC: 15 U/L (ref 2–50)
ANION GAP SERPL CALC-SCNC: 16 MMOL/L (ref 0–11.9)
APTT PPP: 37.6 SEC (ref 24.7–36)
AST SERPL-CCNC: 18 U/L (ref 12–45)
BASOPHILS # BLD AUTO: 0.2 % (ref 0–1.8)
BASOPHILS # BLD: 0.02 K/UL (ref 0–0.12)
BILIRUB SERPL-MCNC: 0.9 MG/DL (ref 0.1–1.5)
BNP SERPL-MCNC: 1583 PG/ML (ref 0–100)
BUN SERPL-MCNC: 43 MG/DL (ref 8–22)
CALCIUM SERPL-MCNC: 9.6 MG/DL (ref 8.4–10.2)
CHLORIDE SERPL-SCNC: 91 MMOL/L (ref 96–112)
CO2 SERPL-SCNC: 27 MMOL/L (ref 20–33)
CREAT SERPL-MCNC: 7.15 MG/DL (ref 0.5–1.4)
EOSINOPHIL # BLD AUTO: 0.03 K/UL (ref 0–0.51)
EOSINOPHIL NFR BLD: 0.4 % (ref 0–6.9)
ERYTHROCYTE [DISTWIDTH] IN BLOOD BY AUTOMATED COUNT: 53.4 FL (ref 35.9–50)
GFR SERPL CREATININE-BSD FRML MDRD: 8 ML/MIN/1.73 M 2
GLOBULIN SER CALC-MCNC: 3.4 G/DL (ref 1.9–3.5)
GLUCOSE SERPL-MCNC: 106 MG/DL (ref 65–99)
HCT VFR BLD AUTO: 32.6 % (ref 42–52)
HGB BLD-MCNC: 10.3 G/DL (ref 14–18)
IMM GRANULOCYTES # BLD AUTO: 0.04 K/UL (ref 0–0.11)
IMM GRANULOCYTES NFR BLD AUTO: 0.5 % (ref 0–0.9)
INR PPP: 1.26 (ref 0.87–1.13)
LIPASE SERPL-CCNC: 29 U/L (ref 7–58)
LYMPHOCYTES # BLD AUTO: 0.51 K/UL (ref 1–4.8)
LYMPHOCYTES NFR BLD: 6 % (ref 22–41)
MCH RBC QN AUTO: 30.7 PG (ref 27–33)
MCHC RBC AUTO-ENTMCNC: 31.6 G/DL (ref 33.7–35.3)
MCV RBC AUTO: 97 FL (ref 81.4–97.8)
MONOCYTES # BLD AUTO: 0.74 K/UL (ref 0–0.85)
MONOCYTES NFR BLD AUTO: 8.8 % (ref 0–13.4)
NEUTROPHILS # BLD AUTO: 7.1 K/UL (ref 1.82–7.42)
NEUTROPHILS NFR BLD: 84.1 % (ref 44–72)
NRBC # BLD AUTO: 0 K/UL
NRBC BLD AUTO-RTO: 0 /100 WBC
PLATELET # BLD AUTO: 227 K/UL (ref 164–446)
PMV BLD AUTO: 9.8 FL (ref 9–12.9)
POTASSIUM SERPL-SCNC: 5.2 MMOL/L (ref 3.6–5.5)
PROT SERPL-MCNC: 7.3 G/DL (ref 6–8.2)
PROTHROMBIN TIME: 15.6 SEC (ref 12–14.6)
RBC # BLD AUTO: 3.36 M/UL (ref 4.7–6.1)
SODIUM SERPL-SCNC: 134 MMOL/L (ref 135–145)
TROPONIN I SERPL-MCNC: 0.02 NG/ML (ref 0–0.04)
WBC # BLD AUTO: 8.4 K/UL (ref 4.8–10.8)

## 2017-04-15 PROCEDURE — 700102 HCHG RX REV CODE 250 W/ 637 OVERRIDE(OP): Performed by: HOSPITALIST

## 2017-04-15 PROCEDURE — 700111 HCHG RX REV CODE 636 W/ 250 OVERRIDE (IP): Performed by: HOSPITALIST

## 2017-04-15 PROCEDURE — G8599 NO ASA/ANTIPLAT THER USE RNG: HCPCS | Performed by: NURSE PRACTITIONER

## 2017-04-15 PROCEDURE — 700111 HCHG RX REV CODE 636 W/ 250 OVERRIDE (IP): Performed by: EMERGENCY MEDICINE

## 2017-04-15 PROCEDURE — 84484 ASSAY OF TROPONIN QUANT: CPT

## 2017-04-15 PROCEDURE — 4040F PNEUMOC VAC/ADMIN/RCVD: CPT | Performed by: NURSE PRACTITIONER

## 2017-04-15 PROCEDURE — 85025 COMPLETE CBC W/AUTO DIFF WBC: CPT

## 2017-04-15 PROCEDURE — 99220 PR INITIAL OBSERVATION CARE,LEVL III: CPT | Performed by: HOSPITALIST

## 2017-04-15 PROCEDURE — G8420 CALC BMI NORM PARAMETERS: HCPCS | Performed by: NURSE PRACTITIONER

## 2017-04-15 PROCEDURE — 3017F COLORECTAL CA SCREEN DOC REV: CPT | Performed by: NURSE PRACTITIONER

## 2017-04-15 PROCEDURE — 96374 THER/PROPH/DIAG INJ IV PUSH: CPT

## 2017-04-15 PROCEDURE — 87077 CULTURE AEROBIC IDENTIFY: CPT

## 2017-04-15 PROCEDURE — G8432 DEP SCR NOT DOC, RNG: HCPCS | Performed by: NURSE PRACTITIONER

## 2017-04-15 PROCEDURE — 71010 DX-CHEST-PORTABLE (1 VIEW): CPT

## 2017-04-15 PROCEDURE — 96375 TX/PRO/DX INJ NEW DRUG ADDON: CPT

## 2017-04-15 PROCEDURE — A9270 NON-COVERED ITEM OR SERVICE: HCPCS | Performed by: HOSPITALIST

## 2017-04-15 PROCEDURE — 74175 CTA ABDOMEN W/CONTRAST: CPT

## 2017-04-15 PROCEDURE — G0378 HOSPITAL OBSERVATION PER HR: HCPCS

## 2017-04-15 PROCEDURE — 99285 EMERGENCY DEPT VISIT HI MDM: CPT

## 2017-04-15 PROCEDURE — 87040 BLOOD CULTURE FOR BACTERIA: CPT | Mod: 91

## 2017-04-15 PROCEDURE — 83880 ASSAY OF NATRIURETIC PEPTIDE: CPT

## 2017-04-15 PROCEDURE — 85730 THROMBOPLASTIN TIME PARTIAL: CPT

## 2017-04-15 PROCEDURE — 93005 ELECTROCARDIOGRAM TRACING: CPT | Performed by: EMERGENCY MEDICINE

## 2017-04-15 PROCEDURE — 94760 N-INVAS EAR/PLS OXIMETRY 1: CPT

## 2017-04-15 PROCEDURE — 99214 OFFICE O/P EST MOD 30 MIN: CPT | Performed by: NURSE PRACTITIONER

## 2017-04-15 PROCEDURE — 85610 PROTHROMBIN TIME: CPT

## 2017-04-15 PROCEDURE — 1101F PT FALLS ASSESS-DOCD LE1/YR: CPT | Performed by: NURSE PRACTITIONER

## 2017-04-15 PROCEDURE — 700117 HCHG RX CONTRAST REV CODE 255: Performed by: EMERGENCY MEDICINE

## 2017-04-15 PROCEDURE — 83690 ASSAY OF LIPASE: CPT

## 2017-04-15 PROCEDURE — 36415 COLL VENOUS BLD VENIPUNCTURE: CPT

## 2017-04-15 PROCEDURE — 80053 COMPREHEN METABOLIC PANEL: CPT

## 2017-04-15 PROCEDURE — 1036F TOBACCO NON-USER: CPT | Performed by: NURSE PRACTITIONER

## 2017-04-15 RX ORDER — ACETAMINOPHEN 325 MG/1
650 TABLET ORAL EVERY 6 HOURS
Status: DISCONTINUED | OUTPATIENT
Start: 2017-04-15 | End: 2017-04-20 | Stop reason: HOSPADM

## 2017-04-15 RX ORDER — BISACODYL 10 MG
10 SUPPOSITORY, RECTAL RECTAL
Status: DISCONTINUED | OUTPATIENT
Start: 2017-04-15 | End: 2017-04-20 | Stop reason: HOSPADM

## 2017-04-15 RX ORDER — ONDANSETRON 2 MG/ML
4 INJECTION INTRAMUSCULAR; INTRAVENOUS EVERY 4 HOURS PRN
Status: DISCONTINUED | OUTPATIENT
Start: 2017-04-15 | End: 2017-04-20 | Stop reason: HOSPADM

## 2017-04-15 RX ORDER — ISOSORBIDE DINITRATE 10 MG/1
10 TABLET ORAL 3 TIMES DAILY
Status: DISCONTINUED | OUTPATIENT
Start: 2017-04-15 | End: 2017-04-20 | Stop reason: HOSPADM

## 2017-04-15 RX ORDER — KETOROLAC TROMETHAMINE 30 MG/ML
30 INJECTION, SOLUTION INTRAMUSCULAR; INTRAVENOUS ONCE
Status: COMPLETED | OUTPATIENT
Start: 2017-04-15 | End: 2017-04-15

## 2017-04-15 RX ORDER — LISINOPRIL 20 MG/1
40 TABLET ORAL 2 TIMES DAILY
Status: DISCONTINUED | OUTPATIENT
Start: 2017-04-15 | End: 2017-04-20 | Stop reason: HOSPADM

## 2017-04-15 RX ORDER — HYDRALAZINE HYDROCHLORIDE 20 MG/ML
10-20 INJECTION INTRAMUSCULAR; INTRAVENOUS EVERY 6 HOURS PRN
Status: DISCONTINUED | OUTPATIENT
Start: 2017-04-15 | End: 2017-04-20 | Stop reason: HOSPADM

## 2017-04-15 RX ORDER — OMEPRAZOLE 20 MG/1
40 CAPSULE, DELAYED RELEASE ORAL DAILY
Status: DISCONTINUED | OUTPATIENT
Start: 2017-04-15 | End: 2017-04-20 | Stop reason: HOSPADM

## 2017-04-15 RX ORDER — TAMSULOSIN HYDROCHLORIDE 0.4 MG/1
0.8 CAPSULE ORAL
Status: DISCONTINUED | OUTPATIENT
Start: 2017-04-16 | End: 2017-04-20 | Stop reason: HOSPADM

## 2017-04-15 RX ORDER — POLYETHYLENE GLYCOL 3350 17 G/17G
1 POWDER, FOR SOLUTION ORAL
Status: DISCONTINUED | OUTPATIENT
Start: 2017-04-15 | End: 2017-04-20 | Stop reason: HOSPADM

## 2017-04-15 RX ORDER — MORPHINE SULFATE 4 MG/ML
4 INJECTION, SOLUTION INTRAMUSCULAR; INTRAVENOUS ONCE
Status: COMPLETED | OUTPATIENT
Start: 2017-04-15 | End: 2017-04-15

## 2017-04-15 RX ORDER — OXYCODONE HYDROCHLORIDE AND ACETAMINOPHEN 5; 325 MG/1; MG/1
1-2 TABLET ORAL EVERY 6 HOURS PRN
Status: DISCONTINUED | OUTPATIENT
Start: 2017-04-15 | End: 2017-04-20 | Stop reason: HOSPADM

## 2017-04-15 RX ORDER — HYDRALAZINE HYDROCHLORIDE 10 MG/1
10 TABLET, FILM COATED ORAL EVERY 8 HOURS
Status: DISCONTINUED | OUTPATIENT
Start: 2017-04-15 | End: 2017-04-17

## 2017-04-15 RX ORDER — FUROSEMIDE 40 MG/1
80 TABLET ORAL 2 TIMES DAILY
Status: DISCONTINUED | OUTPATIENT
Start: 2017-04-15 | End: 2017-04-20 | Stop reason: HOSPADM

## 2017-04-15 RX ORDER — LABETALOL 100 MG/1
500 TABLET, FILM COATED ORAL 2 TIMES DAILY
Status: DISCONTINUED | OUTPATIENT
Start: 2017-04-15 | End: 2017-04-20 | Stop reason: HOSPADM

## 2017-04-15 RX ORDER — TRAZODONE HYDROCHLORIDE 50 MG/1
150 TABLET ORAL
Status: DISCONTINUED | OUTPATIENT
Start: 2017-04-15 | End: 2017-04-20 | Stop reason: HOSPADM

## 2017-04-15 RX ORDER — ONDANSETRON 2 MG/ML
4 INJECTION INTRAMUSCULAR; INTRAVENOUS ONCE
Status: COMPLETED | OUTPATIENT
Start: 2017-04-15 | End: 2017-04-15

## 2017-04-15 RX ORDER — MINOXIDIL 2.5 MG/1
2.5 TABLET ORAL DAILY
Status: DISCONTINUED | OUTPATIENT
Start: 2017-04-16 | End: 2017-04-20 | Stop reason: HOSPADM

## 2017-04-15 RX ORDER — ONDANSETRON 4 MG/1
4 TABLET, ORALLY DISINTEGRATING ORAL EVERY 4 HOURS PRN
Status: DISCONTINUED | OUTPATIENT
Start: 2017-04-15 | End: 2017-04-20 | Stop reason: HOSPADM

## 2017-04-15 RX ORDER — PRAVASTATIN SODIUM 20 MG
10 TABLET ORAL NIGHTLY
Status: DISCONTINUED | OUTPATIENT
Start: 2017-04-15 | End: 2017-04-20 | Stop reason: HOSPADM

## 2017-04-15 RX ORDER — MORPHINE SULFATE 4 MG/ML
1-3 INJECTION, SOLUTION INTRAMUSCULAR; INTRAVENOUS EVERY 4 HOURS PRN
Status: DISCONTINUED | OUTPATIENT
Start: 2017-04-15 | End: 2017-04-18

## 2017-04-15 RX ORDER — AMOXICILLIN 250 MG
2 CAPSULE ORAL 2 TIMES DAILY
Status: DISCONTINUED | OUTPATIENT
Start: 2017-04-15 | End: 2017-04-20 | Stop reason: HOSPADM

## 2017-04-15 RX ORDER — CYCLOBENZAPRINE HCL 10 MG
5 TABLET ORAL 3 TIMES DAILY
Status: COMPLETED | OUTPATIENT
Start: 2017-04-15 | End: 2017-04-18

## 2017-04-15 RX ORDER — HEPARIN SODIUM 5000 [USP'U]/ML
5000 INJECTION, SOLUTION INTRAVENOUS; SUBCUTANEOUS EVERY 8 HOURS
Status: DISCONTINUED | OUTPATIENT
Start: 2017-04-15 | End: 2017-04-20 | Stop reason: HOSPADM

## 2017-04-15 RX ORDER — AMLODIPINE BESYLATE 5 MG/1
10 TABLET ORAL
Status: DISCONTINUED | OUTPATIENT
Start: 2017-04-16 | End: 2017-04-20 | Stop reason: HOSPADM

## 2017-04-15 RX ADMIN — ONDANSETRON 4 MG: 2 INJECTION, SOLUTION INTRAMUSCULAR; INTRAVENOUS at 16:46

## 2017-04-15 RX ADMIN — KETOROLAC TROMETHAMINE 30 MG: 30 INJECTION, SOLUTION INTRAMUSCULAR; INTRAVENOUS at 16:45

## 2017-04-15 RX ADMIN — TRAZODONE HYDROCHLORIDE 150 MG: 50 TABLET ORAL at 21:47

## 2017-04-15 RX ADMIN — LABETALOL HYDROCHLORIDE 500 MG: 100 TABLET, FILM COATED ORAL at 21:49

## 2017-04-15 RX ADMIN — ACETAMINOPHEN 650 MG: 325 TABLET, FILM COATED ORAL at 21:42

## 2017-04-15 RX ADMIN — IOHEXOL 100 ML: 350 INJECTION, SOLUTION INTRAVENOUS at 18:00

## 2017-04-15 RX ADMIN — MORPHINE SULFATE 4 MG: 4 INJECTION INTRAVENOUS at 16:45

## 2017-04-15 RX ADMIN — CALCIUM ACETATE 1334 MG: 667 CAPSULE ORAL at 21:51

## 2017-04-15 RX ADMIN — LISINOPRIL 40 MG: 20 TABLET ORAL at 21:47

## 2017-04-15 RX ADMIN — CYCLOBENZAPRINE HYDROCHLORIDE 5 MG: 10 TABLET, FILM COATED ORAL at 21:42

## 2017-04-15 RX ADMIN — PRAVASTATIN SODIUM 10 MG: 20 TABLET ORAL at 21:46

## 2017-04-15 RX ADMIN — MORPHINE SULFATE 3 MG: 4 INJECTION INTRAVENOUS at 20:11

## 2017-04-15 ASSESSMENT — LIFESTYLE VARIABLES
EVER_SMOKED: YES
ALCOHOL_USE: NO

## 2017-04-15 ASSESSMENT — ENCOUNTER SYMPTOMS
DIZZINESS: 0
ORTHOPNEA: 0
NECK PAIN: 1
CHILLS: 0
DIAPHORESIS: 0
WHEEZING: 0
HEMOPTYSIS: 0
BACK PAIN: 1
FEVER: 1
WEAKNESS: 0
SPUTUM PRODUCTION: 0
HEADACHES: 0
COUGH: 0
PALPITATIONS: 0
SHORTNESS OF BREATH: 1
SORE THROAT: 0

## 2017-04-15 ASSESSMENT — COPD QUESTIONNAIRES
DO YOU EVER COUGH UP ANY MUCUS OR PHLEGM?: NO/ONLY WITH OCCASIONAL COLDS OR INFECTIONS
COPD SCREENING SCORE: 5
DURING THE PAST 4 WEEKS HOW MUCH DID YOU FEEL SHORT OF BREATH: NONE/LITTLE OF THE TIME
HAVE YOU SMOKED AT LEAST 100 CIGARETTES IN YOUR ENTIRE LIFE: YES

## 2017-04-15 ASSESSMENT — PAIN SCALES - GENERAL
PAINLEVEL_OUTOF10: 9
PAINLEVEL_OUTOF10: 6

## 2017-04-15 NOTE — IP AVS SNAPSHOT
" Home Care Instructions                                                                                                                  Name:Parmjit Castelan  Medical Record Number:[id number]  CSN: [id number]    YOB: 1940   Age: 76 y.o.  Sex: male  HT:1.727 m (5' 8\") WT: 75.8 kg (167 lb 1.7 oz)          Admit Date: 4/15/2017     Discharge Date:   Today's Date: 4/20/2017  Attending Doctor:  Mariaelena Millan M.D.                  Allergies:  Review of patient's allergies indicates no known allergies.            Discharge Instructions       Discharge Instructions    Discharged to home by car with relative. Discharged via wheelchair, hospital escort: Yes.  Special equipment needed: Not Applicable    Be sure to schedule a follow-up appointment with your primary care doctor or any specialists as instructed.     Discharge Plan:   Diet Plan: Discussed  Activity Level: Discussed  Confirmed Follow up Appointment: Appointment Scheduled  Confirmed Symptoms Management: Discussed  Medication Reconciliation Updated: Yes  Influenza Vaccine Indication: Not indicated: Previously immunized this influenza season and > 8 years of age    I understand that a diet low in cholesterol, fat, and sodium is recommended for good health. Unless I have been given specific instructions below for another diet, I accept this instruction as my diet prescription.   Other diet: renal    Special Instructions: None    · Is patient discharged on Warfarin / Coumadin?   No     · Is patient Post Blood Transfusion?  No  Diskitis  Diskitis is irritation and swelling (inflammation) of the disks in the spine. Disks are soft structures that cushion the bones of the spine. This is not a common condition. Diskitis most often affects the disks of the lower back (lumbar disks) or upper back (thoracic disks).  CAUSES   A bacterial or viral infection can lead to diskitis. When diskitis develops, it is often accompanied by infection-induced inflammation of the " bones (osteomyelitis) surrounding the spine. The condition can also be caused by inflammation from another condition, like an autoimmune disease. If you have an autoimmune disease, your body's defense system (immune system) mistakenly attacks your own healthy cells instead of germs and other things that can make you sick.  RISK FACTORS  People at higher risk of developing diskitis include:  · Children.  · Older persons.  · People with weak immune systems or immune system disorders.  · People with diabetes.  · People having chemotherapy.  SIGNS AND SYMPTOMS   Back or stomach pain is the most common symptom of diskitis. Walking, standing, and sitting may be painful. Other symptoms may include:   · Trouble standing or rising from a sitting position.  · Fever lower than 102°F (38.9°C).  · Back stiffness.  · Flu-like symptoms, such as a sore throat and a runny nose.  · Irritability.  · Feeling pain when the affected area is touched.  DIAGNOSIS   Your health care provider can diagnose diskitis based on symptoms and medical history. Your health care provider will also do a physical exam. You may also need to have blood tests or imaging studies, such as:  · X-ray of the spine.  · MRI of the spine.  · A bone scan.  TREATMENT   Treatment for diskitis may include:  · Bed rest.  · Medicines, such as:  ¨ Antibiotics to treat a possible bacterial infection.  ¨ Anti-inflammatory medicines.  ¨ Steroids if the condition does not improve over time.  ¨ Pain-relieving medicines.  · A brace to stop your back from moving.  HOME CARE INSTRUCTIONS  · If you were prescribed an antibiotic medicine, finish it all even if you start to feel better.  · Take medicines only as directed by your health care provider.  · Keep all follow-up visits as directed by your health care provider. This is important.  SEEK MEDICAL CARE IF:   · You have difficulty walking or standing.  · You have persistent back pain.  · You are having side effects from  medicines.     This information is not intended to replace advice given to you by your health care provider. Make sure you discuss any questions you have with your health care provider.     Document Released: 11/18/2005 Document Revised: 01/08/2016 Document Reviewed: 04/22/2015  [employer] Interactive Patient Education ©2016 Elsevier Inc.  Morphine sustained-release tablets  What is this medicine?  MORPHINE (MOR feen) is a pain reliever. It is used to treat moderate to severe pain that lasts for more than a few days.  This medicine may be used for other purposes; ask your health care provider or pharmacist if you have questions.  COMMON BRAND NAME(S): MS Clarke, Oramorph SR  What should I tell my health care provider before I take this medicine?  They need to know if you have any of these conditions:  -brain tumor  -drug abuse or addiction  -gallbladder disease  -head injury  -heart disease  -if you frequently drink alcohol-containing drinks  -intestinal disease  -kidney disease or problems urinating  -kyphoscoliosis  -liver disease  -lung disease, asthma, or breathing problems  -pancreatic disease  -seizures  -stomach or intestine problems  -taken isocarboxazid, phenelzine, tranylcypromine, or selegiline in the past 2 weeks  -thyroid disease  -an unusual or allergic reaction to lactose, morphine, other medicines, foods, dyes, or preservatives  -pregnant or trying to get pregnant  -breast-feeding  How should I use this medicine?  Take this medicine by mouth with a glass of water. Do not break, crush, or chew the medicine. Do not take a tablet that is not whole. A broken or crushed tablet can be very dangerous. You may get too much medicine. If the medicine upsets your stomach, take it with food or milk. Follow the directions on the prescription label. Take the medicine at the same time each day. Do not take more medicine than you are told to take.  A special MedGuide will be given to you by the pharmacist with each  prescription and refill. Be sure to read this information carefully each time.  Talk to your pediatrician regarding the use of this medicine in children. Special care may be needed.  Overdosage: If you think you have taken too much of this medicine contact a poison control center or emergency room at once.  NOTE: This medicine is only for you. Do not share this medicine with others.  What if I miss a dose?  If you miss a dose, take it as soon as you can. If it is almost time for your next dose, take only that dose. Do not take double or extra doses.  What may interact with this medicine?  Do not take this medicine with any of the following medications:  -MAOIs like Carbex, Eldepryl, Marplan, Nardil, and Parnate  This medicine may also interact with the following medications:  -alcohol  -antihistamines  -barbiturates, like phenobarbital  -medicines for depression, anxiety, or psychotic disturbances  -medicines for sleep  -muscle relaxants  -naltrexone, naloxone  -narcotic medicines (opiates) for pain  -rifampin  -tramadol  This list may not describe all possible interactions. Give your health care provider a list of all the medicines, herbs, non-prescription drugs, or dietary supplements you use. Also tell them if you smoke, drink alcohol, or use illegal drugs. Some items may interact with your medicine.  What should I watch for while using this medicine?  Tell your doctor or health care professional if your pain does not go away, if it gets worse, or if you have new or a different type of pain. You may develop tolerance to the medicine. Tolerance means that you will need a higher dose of the medicine for pain relief. Tolerance is normal and is expected if you take this medicine for a long time.  Do not suddenly stop taking your medicine because you may develop a severe reaction. Your body becomes used to the medicine. This does NOT mean you are addicted. Addiction is a behavior related to getting and using a drug  for a non-medical reason. If you have pain, you have a medical reason to take pain medicine. Your doctor will tell you how much medicine to take. If your doctor wants you to stop the medicine, the dose will be slowly lowered over time to avoid any side effects.  You may get drowsy or dizzy when you first start taking the medicine or change doses. Do not drive, use machinery, or do anything that may be dangerous until you know how the medicine affects you. Stand or sit up slowly.  There are different types of narcotic medicines (opiates) for pain. If you take more than one type at the same time, you may have more side effects. Give your health care provider a list of all medicines you use. Your doctor will tell you how much medicine to take. Do not take more medicine than directed. Call emergency for help if you have problems breathing.  This medicine will cause constipation. Try to have a bowel movement at least every 2 to 3 days. If you do not have a bowel movement for 3 days, call your doctor or health care professional.  Your mouth may get dry. Drinking water, chewing sugarless gum, or sucking on hard candy may help. See your dentist every 6 months.  What side effects may I notice from receiving this medicine?  Side effects that you should report to your doctor or health care professional as soon as possible:  -allergic reactions like skin rash, itching or hives, swelling of the face, lips, or tongue  -breathing problems  -change in the amount of urine  -confusion  -fast, irregular heartbeat  -fever, chills  -hallucinations  -feeling faint or lightheaded  -seizures  -slow or fast heartbeat  Side effects that usually do not require medical attention (report to your doctor or health care professional if they continue or are bothersome):  -constipation  -dizzy, drowsy  -headache  -nausea, vomiting  -pinpoint pupils  -sweating  This list may not describe all possible side effects. Call your doctor for medical advice  about side effects. You may report side effects to FDA at 6-992-FDA-3293.  Where should I keep my medicine?  Keep out of the reach of children. This medicine can be abused. Keep your medicine in a safe place to protect it from theft. Do not share this medicine with anyone. Selling or giving away this medicine is dangerous and is against the law.  Store at room temperature between 15 and 30 degrees C (59 and 86 degrees F). Protect from light.  Discard unused medicine and used packaging carefully. Pets and children can be harmed if they find used or lost packages. Flush any unused medicine down the toilet. Do not use the medicine after the expiration date. Follow the directions in the MedGuide.  NOTE: This sheet is a summary. It may not cover all possible information. If you have questions about this medicine, talk to your doctor, pharmacist, or health care provider.  © 2014, Elsevier/Gold Standard. (10/7/2013 5:22:00 PM)      Depression / Suicide Risk    As you are discharged from this [facility] facility, it is important to learn how to keep safe from harming yourself.    Recognize the warning signs:  · Abrupt changes in personality, positive or negative- including increase in energy   · Giving away possessions  · Change in eating patterns- significant weight changes-  positive or negative  · Change in sleeping patterns- unable to sleep or sleeping all the time   · Unwillingness or inability to communicate  · Depression  · Unusual sadness, discouragement and loneliness  · Talk of wanting to die  · Neglect of personal appearance   · Rebelliousness- reckless behavior  · Withdrawal from people/activities they love  · Confusion- inability to concentrate     If you or a loved one observes any of these behaviors or has concerns about self-harm, here's what you can do:  · Talk about it- your feelings and reasons for harming yourself  · Remove any means that you might use to hurt yourself (examples: pills, rope,  extension cords, firearm)  · Get professional help from the community (Mental Health, Substance Abuse, psychological counseling)  · Do not be alone:Call your Safe Contact- someone whom you trust who will be there for you.  · Call your local CRISIS HOTLINE 036-0415 or 603-588-0110  · Call your local Children's Mobile Crisis Response Team [place] (440) 414-1160 or www.[employer]  · Call the toll free National Suicide Prevention Hotlines   · National Suicide Prevention Lifeline 990-469-FTSN (8237)  · [employer] Network 800-SUICIDE (566-6005)        Your appointments     Apr 27, 2017  7:40 AM   HOSPITAL FOLLOW UP with SUPA Rivera   [facility] (--)    [address]  [place] [address]   [phone]            May 16, 2017  2:00 PM   Pulmonary Function Test with PFT-RM3   [facility] (--)    [address]  [address]  [place] [address]   [phone]            May 16, 2017  3:00 PM   Established Patient Pul with ILAN Valdes   [facility] (--)    [address]  [address]  [place] [address]   [phone]            Jun 27, 2017  1:00 PM   FOLLOW UP with James Mendoza M.D.   [facility] (--)    [address]  [place] [address]   [phone]              Follow-up Information     1. Follow up with James Mendoza M.D.. Go on 4/27/2017.    Specialty:  Cardiology    Contact information    [address]  [place] [address]  [phone]          2. Follow up with Neurosurgery In 2 weeks.         Discharge Medication Instructions:    Below are the medications your physician expects you to take upon discharge:    Review all your home medications and newly ordered medications with your doctor and/or pharmacist. Follow medication instructions as directed by your doctor and/or pharmacist.    Please keep your  medication list with you and share with your physician.               Medication List      START taking these medications        Instructions    Morning Afternoon Evening Bedtime    MD ALERT... vancomycin        Doctor's comments:  WITH EACH DIALYSIS  AND THE END DATE IS June/12/2017   1 Each by Other route pharmacy to dose.   Dose:  1 Each                        morphine ER 30 MG Tbcr tablet   Last time this was given:  30 mg on 4/20/2017  8:39 AM   Commonly known as:  MS CONTIN        Take 1 Tab by mouth every 12 hours.   Dose:  30 mg                          CHANGE how you take these medications        Instructions    Morning Afternoon Evening Bedtime    oxycodone-acetaminophen 5-325 MG Tabs   What changed:    - how much to take  - how to take this  - when to take this  - reasons to take this  - additional instructions   Last time this was given:  2 Tabs on 4/19/2017  9:04 AM   Commonly known as:  PERCOCET        Take 1-2 Tabs by mouth every 6 hours as needed for Severe Pain.   Dose:  1-2 Tab                          CONTINUE taking these medications        Instructions    Morning Afternoon Evening Bedtime    amlodipine 10 MG Tabs   Last time this was given:  10 mg on 4/20/2017  8:38 AM   Commonly known as:  NORVASC        TAKE ONE TABLET BY MOUTH DAILY                        budesonide-formoterol 160-4.5 MCG/ACT Aero   Commonly known as:  SYMBICORT        Inhale 2 Puffs by mouth 2 Times a Day. Inhale 2 puffs by mouth twice daily. Rinse mouth after each use.   Dose:  2 Puff                        Calcium Acetate (Phos Binder) 667 MG Caps   Last time this was given:  1,334 mg on 4/20/2017 12:03 PM        TAKE TWO CAPSULES BY MOUTH THREE TIMES A DAY WITH A MEAL                        DIALYVITE TABLET Tabs        TAKE ONE TABLET BY MOUTH DAILY                        epoetin hilario 2000 UNIT/ML Soln   Last time this was given:  2,000 Units on 4/19/2017  2:24 PM   Commonly known as:  EPOGEN,PROCRIT        Inject 1 mL as  instructed every Monday, Wednesday, and Friday.   Dose:  2000 Units                        fluticasone 50 MCG/ACT nasal spray   Commonly known as:  FLONASE        PLACE ONE TO TWO SPRAYS IN EACH NOSTRIL EVERY DAY                        furosemide 40 MG Tabs   Last time this was given:  80 mg on 4/20/2017  6:06 AM   Commonly known as:  LASIX        TAKE TWO TABLETS BY MOUTH TWICE A DAY                        labetalol 100 MG Tabs   Last time this was given:  500 mg on 4/20/2017  8:36 AM   Commonly known as:  BLANKA        Doctor's comments:  Called in to [facility] 9/14/2016 via [employer] system   Take 5 Tabs by mouth 2 times a day.   Dose:  500 mg                        lisinopril 40 MG tablet   Last time this was given:  40 mg on 4/20/2017  8:37 AM   Commonly known as:  PRINIVIL, ZESTRIL        TAKE ONE TABLET BY MOUTH TWICE A DAY                        minoxidil 2.5 MG Tabs   Last time this was given:  2.5 mg on 4/20/2017  8:38 AM   Commonly known as:  LONITEN        Take 2.5 mg by mouth every day.   Dose:  2.5 mg                        omeprazole 40 MG delayed-release capsule   Last time this was given:  20 mg on 4/17/2017  8:57 AM   Commonly known as:  PRILOSEC        TAKE ONE CAPSULE BY MOUTH DAILY (GENERIC FOR PRILOSEC)                        ondansetron 4 MG Tbdp   Commonly known as:  ZOFRAN ODT        DISSOLVE 1 TABLET BY MOUTH EVERY 6 HOURS AS NEEDED FOR NAUSEA                        pravastatin 10 MG Tabs   Last time this was given:  10 mg on 4/19/2017  9:10 PM   Commonly known as:  PRAVACHOL        Take 10 mg by mouth every evening.   Dose:  10 mg                        tamsulosin 0.4 MG capsule   Last time this was given:  0.8 mg on 4/20/2017  8:38 AM   Commonly known as:  FLOMAX        TAKE TWO CAPSULES BY MOUTH DAILY 1/2 HOUR AFTER BREAKFAST                        trazodone 150 MG Tabs   Last time this was given:  150 mg on 4/19/2017  9:09 PM   Commonly known as:  DESYREL        TAKE TWO  TABLETS BY MOUTH EVERY NIGHT AT BEDTIME (GENERIC FOR DESYREL)                             Where to Get Your Medications      You can get these medications from any pharmacy     Bring a paper prescription for each of these medications    - MD ALERT... vancomycin  - morphine ER 30 MG Tbcr tablet  - oxycodone-acetaminophen 5-325 MG Tabs            Instructions           Diet / Nutrition:    Follow any diet instructions given to you by your doctor or the dietician, including how much salt (sodium) you are allowed each day.    If you are overweight, talk to your doctor about a weight reduction plan.    Activity:    Remain physically active following your doctor's instructions about exercise and activity.    Rest often.     Any time you become even a little tired or short of breath, SIT DOWN and rest.    Worsening Symptoms:    Report any of the following signs and symptoms to the doctor's office immediately:    *Pain of jaw, arm, or neck  *Chest pain not relieved by medication                               *Dizziness or loss of consciousness  *Difficulty breathing even when at rest   *More tired than usual                                       *Bleeding drainage or swelling of surgical site  *Swelling of feet, ankles, legs or stomach                 *Fever (>100ºF)  *Pink or blood tinged sputum  *Weight gain (3lbs/day or 5lbs /week)           *Shock from internal defibrillator (if applicable)  *Palpitations or irregular heartbeats                *Cool and/or numb extremities    Stroke Awareness    Common Risk Factors for Stroke include:    Age  Atrial Fibrillation  Carotid Artery Stenosis  Diabetes Mellitus  Excessive alcohol consumption  High blood pressure  Overweight   Physical inactivity  Smoking    Warning signs and symptoms of a stroke include:    *Sudden numbness or weakness of the face, arm or leg (especially on one side of the body).  *Sudden confusion, trouble speaking or understanding.  *Sudden trouble seeing  in one or both eyes.  *Sudden trouble walking, dizziness, loss of balance or coordination.Sudden severe headache with no known cause.    It is very important to get treatment quickly when a stroke occurs. If you experience any of the above warning signs, call 911 immediately.                   Disclaimer         Quit Smoking / Tobacco Use:    I understand the use of any tobacco products increases my chance of suffering from future heart disease or stroke and could cause other illnesses which may shorten my life. Quitting the use of tobacco products is the single most important thing I can do to improve my health. For further information on smoking / tobacco cessation call a Toll Free Quit Line at 1-505.867.9316 (*National Cancer [place]) or 1-123.858.4761 (American Lung Association) or you can access the web based program at www.lungCEDU.org.    [place] Tobacco Users Help Line:  (245) 103-8021       Toll Free: 1-132.765.9003  Quit Tobacco Program [facility] (183)867-9272    Crisis Hotline:    [place] Crisis Hotline:  1-945-DSVELRW or 1-581.284.2513    [place] Crisis Hotline:    1-788.335.8818 or 704-357-9790    Discharge Survey:   Thank you for choosing [facility]. We hope we did everything we could to make your hospital stay a pleasant one. You may be receiving a phone survey and we would appreciate your time and participation in answering the questions. Your input is very valuable to us in our efforts to improve our service to our patients and their families.        My signature on this form indicates that:    1. I have reviewed and understand the above information.  2. My questions regarding this information have been answered to my satisfaction.  3. I have formulated a plan with my discharge nurse to obtain my prescribed medications for home.                  Disclaimer         __________________________________                     __________       ________                       Patient  Signature                                                 Date                    Time

## 2017-04-15 NOTE — IP AVS SNAPSHOT
4/20/2017    Parmjit Castelan  [address] [place] [address]    Dear Parmjit:    [facility] wants to ensure your discharge home is safe and you or your loved ones have had all of your questions answered regarding your care after you leave the hospital.    Below is a list of resources and contact information should you have any questions regarding your hospital stay, follow-up instructions, or active medical symptoms.    Questions or Concerns Regarding… Contact   Medical Questions Related to Your Discharge  (7 days a week, 8am-5pm) Contact a Nurse Care Coordinator   [phone]   Medical Questions Not Related to Your Discharge  (24 hours a day / 7 days a week)  Contact the Nurse Health Line   [phone]    Medications or Discharge Instructions Refer to your discharge packet   or contact your [facility] Primary Care Provider   [phone]   Follow-up Appointment(s) Schedule your appointment via [employer]   or contact Scheduling [phone]   Billing Review your statement via [employer]  or contact Billing [phone]   Medical Records Review your records via [employer]   or contact Medical Records [phone]     You may receive a telephone call within two days of discharge. This call is to make certain you understand your discharge instructions and have the opportunity to have any questions answered. You can also easily access your medical information, test results and upcoming appointments via the [employer] free online health management tool. You can learn more and sign up at [employer]. For assistance setting up your [employer] account, please call [phone].    Once again, we want to ensure your discharge home is safe and that you have a clear understanding of any next steps in your care. If you have any questions or concerns, please do not hesitate to contact us, we are here for you. Thank you for choosing [facility] for your healthcare needs.    Sincerely,    Your [facility] Healthcare Team

## 2017-04-15 NOTE — ED NOTES
ERP at bedside. Pt agrees with plan of care discussed by ERP. AIDET acknowledged with patient. Roselia in low position, side rail up for pt safety. Call light within reach. Will continue to monitor.

## 2017-04-15 NOTE — IP AVS SNAPSHOT
[employer] Access Code: [email or web address]  Expires: 5/15/2017  2:51 PM    [employer]  A secure, online tool to manage your health information     [employer]’s [employer]® is a secure, online tool that connects you to your personalized health information from the privacy of your home -- day or night - making it very easy for you to manage your healthcare. Once the activation process is completed, you can even access your medical information using the [employer] carmelo, which is available for free in the Apple Carmelo store or Google Play store.     [employer] provides the following levels of access (as shown below):   My Chart Features   [facility] Primary Care Doctor [facility]  Specialists [facility]  Urgent  Care Non-[facility]  Primary Care  Doctor   Email your healthcare team securely and privately 24/7 X X X X   Manage appointments: schedule your next appointment; view details of past/upcoming appointments X      Request prescription refills. X      View recent personal medical records, including lab and immunizations X X X X   View health record, including health history, allergies, medications X X X X   Read reports about your outpatient visits, procedures, consult and ER notes X X X X   See your discharge summary, which is a recap of your hospital and/or ER visit that includes your diagnosis, lab results, and care plan. X X       How to register for [employer]:  1. Go to  [email or web address].  2. Click on the Sign Up Now box, which takes you to the New Member Sign Up page. You will need to provide the following information:  a. Enter your [employer] Access Code exactly as it appears at the top of this page. (You will not need to use this code after you’ve completed the sign-up process. If you do not sign up before the expiration date, you must request a new code.)   b. Enter your date of birth.   c. Enter your home email address.   d. Click Submit, and follow the next screen’s instructions.  3. Create a [employer] ID. This will be your [employer]  login ID and cannot be changed, so think of one that is secure and easy to remember.  4. Create a Influx password. You can change your password at any time.  5. Enter your Password Reset Question and Answer. This can be used at a later time if you forget your password.   6. Enter your e-mail address. This allows you to receive e-mail notifications when new information is available in Influx.  7. Click Sign Up. You can now view your health information.    For assistance activating your Influx account, call [phone]

## 2017-04-15 NOTE — MR AVS SNAPSHOT
"Parmjit Castelan   4/15/2017 1:30 PM   Office Visit   MRN: [id number]    Department:  [facility]   Dept Phone:  [phone]    Description:  Male : 1940   Provider:  ILAN Juarez           Reason for Visit     Neck Pain since thursday, neck and shoulder blades are very painful       Allergies as of 4/15/2017     Allergen Noted Reactions    Betadine [Povidone Iodine] 2016   Rash, Itching    Per patient      You were diagnosed with     Fever, unspecified fever cause   [6655329]       Pericardial effusion   [339795]       Essential hypertension   [1579088]       ESRD (end stage renal disease) (CMS-HCC)   [126675]       Dialysis patient (CMS-HCC)   [745031]       Chronic obstructive pulmonary disease, unspecified COPD type (CMS-HCC)   [6842804]         Vital Signs     Blood Pressure Pulse Temperature Respirations Height Weight    184/72 mmHg 84 38.3 °C (100.9 °F) 16 1.727 m (5' 7.99\") 74.39 kg (164 lb)    Body Mass Index Oxygen Saturation Smoking Status             24.94 kg/m2 88% Former Smoker         Basic Information     Date Of Birth Sex Race Ethnicity Preferred Language    1940 Male White Non- English      Your appointments     May 16, 2017  2:00 PM   Pulmonary Function Test with PFT-RM3   [facility] (--)    [address]  [address]  [place] [id number] [phone]            May 16, 2017  3:00 PM   Established Patient Pul with ILAN Valdes   [facility] (--)    [address]  [address]  [place] [id number] [phone]            2017  1:00 PM   FOLLOW UP with James Mendoza M.D.   [facility] (--)    [address], [address]  [place] [id number] [phone]              Problem List              ICD-10-CM Priority Class Noted - Resolved    BPH (benign prostatic hypertrophy) N40.0 Low  2009 - Present    Essential hypertension I10 High  " 1/15/2010 - Present    Hard of hearing H91.90   4/22/2011 - Present    Preventative health care Z00.00   4/22/2011 - Present    HELGA (obstructive sleep apnea) G47.33   5/13/2011 - Present    COPD (chronic obstructive pulmonary disease) (CMS-HCC) J44.9   8/2/2011 - Present    BPH (benign prostatic hyperplasia) N40.0   1/13/2012 - Present    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   12/14/2012 - Present    Kidney damage S37.009A   1/29/2013 - Present    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   7/2/2013 - Present    Elevated coronary artery calcium score I25.10 High  2/20/2014 - Present    End stage renal disease (HCC) N18.6   3/24/2014 - Present    Dyslipidemia E78.5 Low  12/5/2014 - Present    Pigmented skin lesion L81.9   3/18/2015 - Present    Basal cell carcinoma of left cheek C44.319   3/26/2015 - Present    Anemia in CKD (chronic kidney disease) N18.9, D63.1   5/29/2015 - Present    Leg pain, bilateral M79.604, M79.605   8/10/2015 - Present    Primary osteoarthritis of both hands M19.041, M19.042   8/20/2015 - Present    Chronic gout M1A.9XX0   1/7/2016 - Present    Respiratory failure (CMS-HCC) J96.90   5/8/2016 - Present    Hyperkalemia, diminished renal excretion E87.5   5/8/2016 - Present    Pneumonia J18.9   5/8/2016 - Present    Pulmonary edema J81.1   5/8/2016 - Present    Anemia D64.9   5/8/2016 - Present    Leukocytosis D72.829   5/8/2016 - Present    COPD exacerbation (CMS-HCC) J44.1   5/8/2016 - Present    ESRD (end stage renal disease) on dialysis (CMS-HCC) N18.6, Z99.2   5/8/2016 - Present    Fluid overload E87.70   5/8/2016 - Present    Systolic CHF, acute (CMS-HCC) I50.21   5/11/2016 - Present    HTN (hypertension), malignant I10   5/11/2016 - Present    Bacteremia due to Staphylococcus aureus R78.81   5/11/2016 - Present    Hyperkalemia E87.5   5/11/2016 - Present    Renal cyst N28.1   5/11/2016 - Present    ESRD (end stage renal disease) (CMS-HCC) N18.6   8/12/2016 - Present    GIB  (gastrointestinal bleeding) K92.2 High  8/12/2016 - Present    Hypotension I95.9 Medium  8/13/2016 - Present    Need for prophylactic vaccination with combined vaccine Z23   9/8/2016 - Present    AVM (arteriovenous malformation) Q27.30   9/8/2016 - Present    Dyspnea on exertion R06.09   3/16/2017 - Present    Nocturnal hypoxemia G47.34   3/21/2017 - Present    Pulmonary hypertension (CMS-HCC) I27.2   3/21/2017 - Present    Pedal edema R60.0   3/21/2017 - Present      Health Maintenance        Date Due Completion Dates    IMM DTaP/Tdap/Td Vaccine (1 - Tdap) 7/30/1961 ---    IMM ZOSTER VACCINE 7/30/2002 ---    IMM PNEUMOCOCCAL 65+ (ADULT) HIGH/HIGHEST RISK SERIES (2 of 2 - PCV13) 9/8/2017 9/8/2016    COLONOSCOPY 8/15/2026 8/15/2016, 9/16/2013            Current Immunizations     Influenza TIV (IM) 10/1/2013    Pneumococcal polysaccharide vaccine (PPSV-23) 9/8/2016      Below and/or attached are the medications your provider expects you to take. Review all of your home medications and newly ordered medications with your provider and/or pharmacist. Follow medication instructions as directed by your provider and/or pharmacist. Please keep your medication list with you and share with your provider. Update the information when medications are discontinued, doses are changed, or new medications (including over-the-counter products) are added; and carry medication information at all times in the event of emergency situations     Allergies:  BETADINE - Rash,Itching               Medications  Valid as of: April 15, 2017 -  2:51 PM    Generic Name Brand Name Tablet Size Instructions for use    AmLODIPine Besylate (Tab) NORVASC 10 MG TAKE ONE TABLET BY MOUTH DAILY        B Complex-C-Folic Acid (Tab) DIALYVITE TABLET  TAKE ONE TABLET BY MOUTH DAILY        Budesonide-Formoterol Fumarate (Aerosol) SYMBICORT 160-4.5 MCG/ACT Inhale 2 Puffs by mouth 2 Times a Day. Inhale 2 puffs by mouth twice daily. Rinse mouth after each use.           Calcium Acetate (Phos Binder) (Cap) Calcium Acetate (Phos Binder) 667 MG TAKE TWO CAPSULES BY MOUTH THREE TIMES A DAY WITH A MEAL        Doxazosin Mesylate (Tab) CARDURA 4 MG Take 1 Tab by mouth every day.        Epoetin Mitchel (Solution) EPOGEN,PROCRIT 2000 UNIT/ML Inject 1 mL as instructed every Monday, Wednesday, and Friday.        Famotidine (Tab) PEPCID 20 MG Take 20 mg by mouth 2 times a day.        Fluticasone Propionate (Suspension) FLONASE 50 MCG/ACT PLACE ONE TO TWO SPRAYS IN EACH NOSTRIL EVERY DAY        Furosemide (Tab) LASIX 40 MG TAKE TWO TABLETS BY MOUTH TWICE A DAY        Ipratropium-Albuterol (Aero Soln) COMBIVENT RESPIMAT  MCG/ACT Inhale 1 Puff by mouth 4 times a day. As needed for cough        Labetalol HCl (Tab) NORMODYNE 100 MG Take 5 Tabs by mouth 2 times a day.        Lisinopril (Tab) PRINIVIL, ZESTRIL 40 MG TAKE ONE TABLET BY MOUTH TWICE A DAY        Minoxidil (Tab) LONITEN 2.5 MG Take 2.5 mg by mouth every day.        Omeprazole (CAPSULE DELAYED RELEASE) PRILOSEC 40 MG TAKE ONE CAPSULE BY MOUTH DAILY (GENERIC FOR PRILOSEC)        Ondansetron (TABLET DISPERSIBLE) ZOFRAN ODT 4 MG DISSOLVE 1 TABLET BY MOUTH EVERY 6 HOURS AS NEEDED FOR NAUSEA        Oxycodone-Acetaminophen (Tab) PERCOCET 5-325 MG 1 tab po bid prn severe pain NO ALCOHOL and NO DRIVING within 24 hrs of taking this medication, NO BENZODIAZEPINE MEDICATIONS, no selling or giving to any other persons        Pravastatin Sodium (Tab) PRAVACHOL 10 MG Take 10 mg by mouth every evening.        Tamsulosin HCl (Cap) FLOMAX 0.4 MG TAKE TWO CAPSULES BY MOUTH DAILY 1/2 HOUR AFTER BREAKFAST        TraZODone HCl (Tab) DESYREL 150 MG TAKE TWO TABLETS BY MOUTH EVERY NIGHT AT BEDTIME (GENERIC FOR DESYREL)        .                 Medicines prescribed today were sent to:     [id number] - [place], [place] - [address]    [address] [place] [address]    Phone: [phone] Fax: [phone]    Open 24 Hours?: No       Medication refill instructions:       If your prescription bottle indicates you have medication refills left, it is not necessary to call your provider’s office. Please contact your pharmacy and they will refill your medication.    If your prescription bottle indicates you do not have any refills left, you may request refills at any time through one of the following ways: The online Ang[employer] system (except Urgent Care), by calling your provider’s office, or by asking your pharmacy to contact your provider’s office with a refill request. Medication refills are processed only during regular business hours and may not be available until the next business day. Your provider may request additional information or to have a follow-up visit with you prior to refilling your medication.   *Please Note: Medication refills are assigned a new Rx number when refilled electronically. Your pharmacy may indicate that no refills were authorized even though a new prescription for the same medication is available at the pharmacy. Please request the medicine by name with the pharmacy before contacting your provider for a refill.           [employer] Access Code: [email or web address]  Expires: 5/15/2017  2:51 PM    [employer]  A secure, online tool to manage your health information     [employer]’s [employer]® is a secure, online tool that connects you to your personalized health information from the privacy of your home -- day or night - making it very easy for you to manage your healthcare. Once the activation process is completed, you can even access your medical information using the Angelfish carmelo, which is available for free in the Apple Carmelo store or Google Play store.     [employer] provides the following levels of access (as shown below):   My Chart Features   [facility] Primary Care Doctor [facility]  Specialists [facility]  Urgent  Care Non-[facility]  Primary Care  Doctor   Email your healthcare team securely and privately 24/7 X X X    Manage  appointments: schedule your next appointment; view details of past/upcoming appointments X      Request prescription refills. X      View recent personal medical records, including lab and immunizations X X X X   View health record, including health history, allergies, medications X X X X   Read reports about your outpatient visits, procedures, consult and ER notes X X X X   See your discharge summary, which is a recap of your hospital and/or ER visit that includes your diagnosis, lab results, and care plan. X X       How to register for [employer]:  1. Go to  [email or web address].  2. Click on the Sign Up Now box, which takes you to the New Member Sign Up page. You will need to provide the following information:  a. Enter your [employer] Access Code exactly as it appears at the top of this page. (You will not need to use this code after you’ve completed the sign-up process. If you do not sign up before the expiration date, you must request a new code.)   b. Enter your date of birth.   c. Enter your home email address.   d. Click Submit, and follow the next screen’s instructions.  3. Create a [employer] ID. This will be your [employer] login ID and cannot be changed, so think of one that is secure and easy to remember.  4. Create a [employer] password. You can change your password at any time.  5. Enter your Password Reset Question and Answer. This can be used at a later time if you forget your password.   6. Enter your e-mail address. This allows you to receive e-mail notifications when new information is available in [employer].  7. Click Sign Up. You can now view your health information.    For assistance activating your [employer] account, call [phone]

## 2017-04-15 NOTE — ED PROVIDER NOTES
ED Provider Note    CHIEF COMPLAINT  Chief Complaint   Patient presents with   • Neck Pain   • Back Pain       HPI  Parmjit Castelan is a 76 y.o. male who presents with a history of end-stage renal disease on dialysis, the patient had an echocardiogram April 11, 2017 which is 4 days ago that showed a moderate-sized pericardial effusion with no hemodynamic compromise. The patient has had severe neck and shoulder blade pain for one week. Percocet helps at night when he sleeps. The patient went to urgent care today and was found to have a temperature on his forehead of 100.9, however his oral temperature was 99. The patient here did not have fever, the patient at home does not report fever. The pain is worse if he tries to move his head or use his arms. He has never had pain like this before.    REVIEW OF SYSTEMS  See HPI for further details. All other systems are negative.     PAST MEDICAL HISTORY   has a past medical history of BPH (7/14/2009); HTN (hypertension) (1/15/2010); Snoring; COPD (chronic obstructive pulmonary disease) (CMS-HCC); Proteinuria; COPD (chronic obstructive pulmonary disease) (CMS-HCC) (8/2/2011); EMPHYSEMA; Detached retina; CKD (chronic kidney disease) stage 4, GFR 15-29 ml/min (CMS-HCC) (1/15/2010); Kidney cyst; On supplemental oxygen therapy; Heart burn; Indigestion; Bronchitis (1/1/13); CAD (coronary artery disease) (2/20/2014); Sleep apnea; Dialysis; CATARACT; Basal cell carcinoma of left cheek (3/26/2015); Leg pain, bilateral (8/10/2015); Gout; Pneumonia; Anesthesia; and High cholesterol.    SOCIAL HISTORY  Social History     Social History Main Topics   • Smoking status: Former Smoker -- 1.00 packs/day for 40 years     Types: Cigarettes     Quit date: 01/01/2009   • Smokeless tobacco: Never Used      Comment: 1 pk a day for 35 yrs, QUIT JAN 1 2010   • Alcohol Use: No   • Drug Use: No   • Sexual Activity: Not on file       SURGICAL HISTORY   has past surgical history that includes cataract  "phaco with iol (4/8/08); av fistula creation (2/12/2010); av fistula revision (2/19/2010); av fistulogram (7/23/2010); angioplasty balloon (7/23/2010); av fistulogram (9/17/2010); angioplasty balloon (9/17/2010); vitrectomy posterior (1/18/2011); scleral buckling (1/18/2011); recovery (8/12/2011); vitrectomy posterior (10/11/2011); recovery (7/23/2012); recovery (1/29/2013); recovery (7/2/2013); av fistula thrombolysis (7/2/2013); recovery (12/17/2013); recovery (3/24/2014); recovery (7/29/2014); recovery (3/24/2015); recovery (12/23/2015); gastroscopy with biopsy (8/13/2016); and colonoscopy - endo (8/15/2016).    CURRENT MEDICATIONS  Home Medications     Reviewed by Mee Richard (Pharmacy Tech) on 04/15/17 at 1642  Med List Status: Complete    Medication Last Dose Status    amlodipine (NORVASC) 10 MG Tab 4/15/2017 Active    B Complex-C-Folic Acid (DIALYVITE TABLET) Tab 4/15/2017 Active    budesonide-formoterol (SYMBICORT) 160-4.5 MCG/ACT Aerosol 4/14/2017 Active    Calcium Acetate, Phos Binder, 667 MG Cap 4/15/2017 Active    epoetin hilario (EPOGEN,PROCRIT) 2000 UNIT/ML Solution 4/14/2017 Active    fluticasone (FLONASE) 50 MCG/ACT nasal spray 4/14/2017 Active    furosemide (LASIX) 40 MG Tab 4/15/2017 Active    labetalol (NORMODYNE) 100 MG Tab 4/15/2017 Active    lisinopril (PRINIVIL, ZESTRIL) 40 MG tablet 4/15/2017 Active    minoxidil (LONITEN) 2.5 MG Tab 4/15/2017 Active    omeprazole (PRILOSEC) 40 MG delayed-release capsule 4/14/2017 Active    ondansetron (ZOFRAN ODT) 4 MG TABLET DISPERSIBLE 4/12/2017 Active    oxycodone-acetaminophen (PERCOCET) 5-325 MG Tab 4/14/2017 Active    pravastatin (PRAVACHOL) 10 MG Tab 4/14/2017 Active    tamsulosin (FLOMAX) 0.4 MG capsule 4/15/2017 Active    trazodone (DESYREL) 150 MG Tab 4/14/2017 Active                  ALLERGIES  No Known Allergies    PHYSICAL EXAM  VITAL SIGNS: /77 mmHg  Pulse 78  Temp(Src) 37.2 °C (98.9 °F)  Resp 19  Ht 1.727 m (5' 8\")  Wt 75.8 " kg (167 lb 1.7 oz)  BMI 25.41 kg/m2  SpO2 98% @NASEEM[[id number]::@   Pulse ox interpretation: I interpret this pulse ox as normal.  Constitutional: Alert in no apparent distress.  HENT: No signs of trauma, Bilateral external ears normal, Nose normal.   Eyes: Pupils are equal and reactive, Conjunctiva normal, Non-icteric.   Neck: Normal range of motion, the patient is tender over his bilateral paraspinal cervical spine neck, tenderness over his trapezius. His symptoms are consistent with muscle tenderness.   Lymphatic: No lymphadenopathy noted.   Cardiovascular: Regular rate and rhythm, no murmurs.   Thorax & Lungs: Normal breath sounds, No respiratory distress, No wheezing, No chest tenderness.   Abdomen: Bowel sounds normal, Soft, No tenderness, No masses, No pulsatile masses. No peritoneal signs.  Skin: Warm, Dry, No erythema, No rash.   Back: No bony tenderness, No CVA tenderness.   Extremities: Intact distal pulses, No edema, No tenderness, No cyanosis.  Musculoskeletal: Good range of motion in all major joints. No tenderness to palpation or major deformities noted.   Neurologic: Alert , Normal motor function, Normal sensory function, No focal deficits noted.   Psychiatric: Affect normal, Judgment normal, Mood normal.       DIAGNOSTIC STUDIES / PROCEDURES    EKG  This is a 12-lead EKG interpretation by myself. It is normal sinus rhythm at a rate of 74. The axis is LAD -34°. The intervals, there is a prolonged  consistent with RBBB. There is no ST elevation or depression. When compared with EKG from August 12, 2016 there are no significant changes, he had a previous RBBB. EKG does not meet STEMI criteria this time.    LABS  Labs Reviewed   CBC WITH DIFFERENTIAL - Abnormal; Notable for the following:     RBC 3.36 (*)     Hemoglobin 10.3 (*)     Hematocrit 32.6 (*)     MCHC 31.6 (*)     RDW 53.4 (*)     Neutrophils-Polys 84.10 (*)     Lymphocytes 6.00 (*)     Lymphs (Absolute) 0.51 (*)     All other components  within normal limits    Narrative:     Indicate which anticoagulants the patient is on:->UNKNOWN   COMP METABOLIC PANEL - Abnormal; Notable for the following:     Sodium 134 (*)     Chloride 91 (*)     Anion Gap 16.0 (*)     Glucose 106 (*)     Bun 43 (*)     Creatinine 7.15 (*)     Alkaline Phosphatase 101 (*)     All other components within normal limits    Narrative:     Indicate which anticoagulants the patient is on:->UNKNOWN   BTYPE NATRIURETIC PEPTIDE - Abnormal; Notable for the following:     B Natriuretic Peptide 1583 (*)     All other components within normal limits    Narrative:     Indicate which anticoagulants the patient is on:->UNKNOWN   PROTHROMBIN TIME - Abnormal; Notable for the following:     PT 15.6 (*)     INR 1.26 (*)     All other components within normal limits    Narrative:     Indicate which anticoagulants the patient is on:->UNKNOWN   APTT - Abnormal; Notable for the following:     APTT 37.6 (*)     All other components within normal limits    Narrative:     Indicate which anticoagulants the patient is on:->UNKNOWN   ESTIMATED GFR - Abnormal; Notable for the following:     GFR If  9 (*)     GFR If Non  8 (*)     All other components within normal limits    Narrative:     Indicate which anticoagulants the patient is on:->UNKNOWN   LIPASE    Narrative:     Indicate which anticoagulants the patient is on:->UNKNOWN   TROPONIN    Narrative:     Indicate which anticoagulants the patient is on:->UNKNOWN         RADIOLOGY  CT-CTA COMPLETE THORACOABDOMINAL AORTA   Final Result      1.  Aortic atherosclerosis without evidence of dissection or aneurysm.      2.  Moderate pericardial effusion with pulmonary edema and trace pleural effusions.      3.  Persistent ill-defined hyperenhancement within the medial segment of the left hepatic lobe could related to shunting though hypervascular lesion is difficult to exclude. Recommend follow-up liver CT or MRI.      4.   Bilateral renal cortical thinning with multiple cysts.         DX-CHEST-PORTABLE (1 VIEW)   Final Result      Stable marked cardiac silhouette enlargement without consolidation identified      Left subclavian stent              COURSE & MEDICAL DECISION MAKING  Pertinent Labs & Imaging studies reviewed. (See chart for details)    Differential diagnosis: Musculoskeletal neck and back pain, pericarditis, cardiac tamponade    The patient was given Toradol, Zofran, morphine IV.    The patient's blood pressure spiked to 220 systolic, I have ordered a CTA to evaluate his aorta.    5:42 PM I spoke with Dr. Key who is on for Dr. Larson who would dialyze the patient tomorrow morning after the IV contrast.    The patient will be admitted by the [facility] hospitalist in fair condition.        FINAL IMPRESSION  1. Hypertensive urgency    2. Neck pain               Electronically signed by: Wilbert Hurtado, 4/15/2017 4:06 PM

## 2017-04-15 NOTE — PROGRESS NOTES
Subjective:      Parmjit Castelan is a 76 y.o. male who presents with Neck Pain            Neck Pain   Associated symptoms include a fever. Pertinent negatives include no chest pain, headaches or weakness.   Patient reports that he had a echocardiogram on 4/11/17.  He was told that he had a pericardial effusion and to avoid laying flat.  He was given ED precautions and scheduled to follow up with cardiology in 2 months.  Since 4/13/17  he has noted a progressively worsening pain in his neck and trapezius region bilaterally.  He denies any chest pain or palpitations.  He has baseline shortness of breath due to COPD but reports no worsening symptoms in that regard.  No cough or sputum production.  He has oxygen for nighttime prn use, but normally sats in the low 90s on room air during the daytime.  Today he was satting 84% after ambulation and up to 88% after rest in clinic.  He was found to be febrile as well, which he had not noted at home.  His blood pressure is up today, and was also high yesterday at dialysis.  He goes to dialysis for ESRD.      Review of Systems   Constitutional: Positive for fever and malaise/fatigue. Negative for chills and diaphoresis.   HENT: Negative for congestion, ear pain and sore throat.    Respiratory: Positive for shortness of breath. Negative for cough, hemoptysis, sputum production and wheezing.    Cardiovascular: Negative for chest pain, palpitations, orthopnea and leg swelling.   Musculoskeletal: Positive for back pain and neck pain.   Skin: Negative for rash.   Neurological: Negative for dizziness, weakness and headaches.     Medications, Allergies, and current problem list reviewed today in [facility]  Current Outpatient Prescriptions on File Prior to Visit   Medication Sig Dispense Refill   • trazodone (DESYREL) 150 MG Tab TAKE TWO TABLETS BY MOUTH EVERY NIGHT AT BEDTIME (GENERIC FOR DESYREL) 180 Tab 4   • ondansetron (ZOFRAN ODT) 4 MG TABLET DISPERSIBLE DISSOLVE 1 TABLET BY MOUTH  EVERY 6 HOURS AS NEEDED FOR NAUSEA 30 Tab 4   • B Complex-C-Folic Acid (DIALYVITE TABLET) Tab TAKE ONE TABLET BY MOUTH DAILY 90 Tab 4   • famotidine (PEPCID) 20 MG Tab Take 20 mg by mouth 2 times a day.     • tamsulosin (FLOMAX) 0.4 MG capsule TAKE TWO CAPSULES BY MOUTH DAILY 1/2 HOUR AFTER BREAKFAST 180 Cap 11   • doxazosin (CARDURA) 4 MG Tab Take 1 Tab by mouth every day. 90 Tab 3   • oxycodone-acetaminophen (PERCOCET) 5-325 MG Tab 1 tab po bid prn severe pain NO ALCOHOL and NO DRIVING within 24 hrs of taking this medication, NO BENZODIAZEPINE MEDICATIONS, no selling or giving to any other persons (Patient taking differently: 1 Tab at bedtime as needed. 1 tab po bid prn severe pain NO ALCOHOL and NO DRIVING within 24 hrs of taking this medication, NO BENZODIAZEPINE MEDICATIONS, no selling or giving to any other persons) 60 Tab 0   • Calcium Acetate, Phos Binder, 667 MG Cap TAKE TWO CAPSULES BY MOUTH THREE TIMES A DAY WITH A MEAL 180 Cap 11   • labetalol (NORMODYNE) 100 MG Tab Take 5 Tabs by mouth 2 times a day. 300 Tab 6   • fluticasone (FLONASE) 50 MCG/ACT nasal spray PLACE ONE TO TWO SPRAYS IN EACH NOSTRIL EVERY DAY 1 Bottle 5   • omeprazole (PRILOSEC) 40 MG delayed-release capsule TAKE ONE CAPSULE BY MOUTH DAILY (GENERIC FOR PRILOSEC) 90 Cap 4   • epoetin hilario (EPOGEN,PROCRIT) 2000 UNIT/ML Solution Inject 1 mL as instructed every Monday, Wednesday, and Friday. 1 mL 0   • pravastatin (PRAVACHOL) 10 MG Tab Take 10 mg by mouth every evening.     • minoxidil (LONITEN) 2.5 MG Tab Take 2.5 mg by mouth every day.     • amlodipine (NORVASC) 10 MG Tab TAKE ONE TABLET BY MOUTH DAILY 90 Tab 4   • budesonide-formoterol (SYMBICORT) 160-4.5 MCG/ACT Aerosol Inhale 2 Puffs by mouth 2 Times a Day. Inhale 2 puffs by mouth twice daily. Rinse mouth after each use. 1 Inhaler 12   • lisinopril (PRINIVIL, ZESTRIL) 40 MG tablet TAKE ONE TABLET BY MOUTH TWICE A DAY 60 Tab 11   • furosemide (LASIX) 40 MG Tab TAKE TWO TABLETS BY MOUTH  "TWICE A  Tab 9   • ipratropium-albuterol (COMBIVENT RESPIMAT)  MCG/ACT AERS Inhale 1 Puff by mouth 4 times a day. As needed for cough (Patient taking differently: Inhale 1 Puff by mouth as needed. As needed for cough) 1 Inhaler 1     No current facility-administered medications on file prior to visit.          Objective:     /72 mmHg  Pulse 84  Temp(Src) 38.3 °C (100.9 °F)  Resp 16  Ht 1.727 m (5' 7.99\")  Wt 74.39 kg (164 lb)  BMI 24.94 kg/m2  SpO2 88%     Physical Exam   Constitutional: He is oriented to person, place, and time. He appears well-developed and well-nourished. No distress.   HENT:   Head: Normocephalic.   Eyes: Conjunctivae are normal. Pupils are equal, round, and reactive to light. Right eye exhibits no discharge. Left eye exhibits no discharge. No scleral icterus.   Neck: Normal range of motion. Neck supple. No JVD present. No tracheal deviation present. No thyromegaly present.   Generalized midline cervical spinal tenderness and bilateral trapezius muscle tenderness.  Skin is warm, dry, and intact with no bruising, erythema, rash or lesion.  No nuchal rigidity.     Cardiovascular: Normal rate, regular rhythm and normal heart sounds.  Exam reveals no gallop and no friction rub.    No murmur heard.  Pulmonary/Chest: Effort normal. No stridor. No respiratory distress. He has wheezes. He has no rales. He exhibits no tenderness.   Light wheezes through left field.     Lymphadenopathy:     He has no cervical adenopathy.   Neurological: He is alert and oriented to person, place, and time.   Skin: Skin is warm and dry. He is not diaphoretic. No erythema. No pallor.   Vitals reviewed.    In clinic EKG: normal sinus rhythm with right bundle branch block.  Consistent with previous EKG results.  No ST elevation or T-wave inversion.            Assessment/Plan:     1. Fever, unspecified fever cause     2. Pericardial effusion     3. Essential hypertension     4. ESRD (end stage renal " disease) (CMS-HCC)     5. Dialysis patient (CMS-HCC)     6. Chronic obstructive pulmonary disease, unspecified COPD type (CMS-HCC)       Discussed exam findings with patient.  Due to febrile state, known pericardial effusion, and significant comorbid conditions, I am recommending he seek further evaluation and care in the ED.    Patient will travel via private car with his wife driving.  Call reported to [facility]: Dr. Chong.  Patient verbalized understanding of and agreed with plan of care.

## 2017-04-15 NOTE — IP AVS SNAPSHOT
" <p align=\"LEFT\"><IMG SRC=\"//[email or web address]$/[email or web address]\" alt=\"Image\" WIDTH=\"50%\" HEIGHT=\"200\" BORDER=\"\"></p>                   Name:Parmjit Castelan  Medical Record Number:[id number]  CSN: [id number]    YOB: 1940   Age: 76 y.o.  Sex: male  HT:1.727 m (5' 8\") WT: 75.8 kg (167 lb 1.7 oz)          Admit Date: 4/15/2017     Discharge Date:   Today's Date: 4/20/2017  Attending Doctor:  Mariaelena Millan M.D.                  Allergies:  Review of patient's allergies indicates no known allergies.          Your appointments     Apr 27, 2017  7:40 AM   HOSPITAL FOLLOW UP with SUPA Rivera   [facility] (--)    [address]  [place] [address]   [phone]            May 16, 2017  2:00 PM   Pulmonary Function Test with PFT-RM3   [facility] (--)    [address]  [address]  [place] [address]   [phone]            May 16, 2017  3:00 PM   Established Patient Pul with ILAN Valdes   [facility] (--)    [address]  [address]  [place] [address]   [phone]            Jun 27, 2017  1:00 PM   FOLLOW UP with James Mendoza M.D.   [facility] (--)    [address]  [place] [address]   [phone]              Follow-up Information     1. Follow up with James Mendoza M.D.. Go on 4/27/2017.    Specialty:  Cardiology    Contact information    [address]  [place] [id number] [phone]          2. Follow up with Neurosurgery In 2 weeks.         Medication List      Take these Medications        Instructions    amlodipine 10 MG Tabs   Commonly known as:  NORVASC    TAKE ONE TABLET BY MOUTH DAILY       budesonide-formoterol 160-4.5 MCG/ACT Aero   Commonly known as:  SYMBICORT    Inhale 2 Puffs by mouth 2 Times a Day. Inhale 2 puffs by mouth twice daily. Rinse mouth after each use.   Dose:  2 Puff       Calcium " Acetate (Phos Binder) 667 MG Caps    TAKE TWO CAPSULES BY MOUTH THREE TIMES A DAY WITH A MEAL       DIALYVITE TABLET Tabs    TAKE ONE TABLET BY MOUTH DAILY       epoetin hilario 2000 UNIT/ML Soln   Commonly known as:  EPOGEN,PROCRIT    Inject 1 mL as instructed every Monday, Wednesday, and Friday.   Dose:  2000 Units       fluticasone 50 MCG/ACT nasal spray   Commonly known as:  FLONASE    PLACE ONE TO TWO SPRAYS IN EACH NOSTRIL EVERY DAY       furosemide 40 MG Tabs   Commonly known as:  LASIX    TAKE TWO TABLETS BY MOUTH TWICE A DAY       labetalol 100 MG Tabs   Commonly known as:  NORMODYNE    Doctor's comments:  Called in to [facility] 9/14/2016 via [employer] system   Take 5 Tabs by mouth 2 times a day.   Dose:  500 mg       lisinopril 40 MG tablet   Commonly known as:  PRINIVIL, ZESTRIL    TAKE ONE TABLET BY MOUTH TWICE A DAY       MD ALERT... vancomycin    Doctor's comments:  WITH EACH DIALYSIS  AND THE END DATE IS June/12/2017   1 Each by Other route pharmacy to dose.   Dose:  1 Each       minoxidil 2.5 MG Tabs   Commonly known as:  LONITEN    Take 2.5 mg by mouth every day.   Dose:  2.5 mg       morphine ER 30 MG Tbcr tablet   Commonly known as:  MS CONTIN    Take 1 Tab by mouth every 12 hours.   Dose:  30 mg       omeprazole 40 MG delayed-release capsule   Commonly known as:  PRILOSEC    TAKE ONE CAPSULE BY MOUTH DAILY (GENERIC FOR PRILOSEC)       ondansetron 4 MG Tbdp   Commonly known as:  ZOFRAN ODT    DISSOLVE 1 TABLET BY MOUTH EVERY 6 HOURS AS NEEDED FOR NAUSEA       oxycodone-acetaminophen 5-325 MG Tabs   What changed:    - how much to take  - how to take this  - when to take this  - reasons to take this  - additional instructions   Commonly known as:  PERCOCET    Take 1-2 Tabs by mouth every 6 hours as needed for Severe Pain.   Dose:  1-2 Tab       pravastatin 10 MG Tabs   Commonly known as:  PRAVACHOL    Take 10 mg by mouth every evening.   Dose:  10 mg       tamsulosin 0.4 MG capsule   Commonly  known as:  FLOMAX    TAKE TWO CAPSULES BY MOUTH DAILY 1/2 HOUR AFTER BREAKFAST       trazodone 150 MG Tabs   Commonly known as:  DESYREL    TAKE TWO TABLETS BY MOUTH EVERY NIGHT AT BEDTIME (GENERIC FOR DESYREL)

## 2017-04-16 ENCOUNTER — APPOINTMENT (OUTPATIENT)
Dept: RADIOLOGY | Facility: MEDICAL CENTER | Age: 77
DRG: 539 | End: 2017-04-16
Attending: HOSPITALIST
Payer: MEDICARE

## 2017-04-16 PROBLEM — R78.81 BACTEREMIA DUE TO GRAM-POSITIVE BACTERIA: Status: ACTIVE | Noted: 2017-04-16

## 2017-04-16 LAB
ANION GAP SERPL CALC-SCNC: 12 MMOL/L (ref 0–11.9)
BUN SERPL-MCNC: 50 MG/DL (ref 8–22)
CALCIUM SERPL-MCNC: 9 MG/DL (ref 8.4–10.2)
CHLORIDE SERPL-SCNC: 96 MMOL/L (ref 96–112)
CO2 SERPL-SCNC: 28 MMOL/L (ref 20–33)
CREAT SERPL-MCNC: 8.49 MG/DL (ref 0.5–1.4)
ERYTHROCYTE [DISTWIDTH] IN BLOOD BY AUTOMATED COUNT: 52.8 FL (ref 35.9–50)
GFR SERPL CREATININE-BSD FRML MDRD: 6 ML/MIN/1.73 M 2
GLUCOSE SERPL-MCNC: 82 MG/DL (ref 65–99)
HBV SURFACE AB SERPL IA-ACNC: >1000 MIU/ML (ref 0–10)
HBV SURFACE AG SER QL: NEGATIVE
HCT VFR BLD AUTO: 28.6 % (ref 42–52)
HGB BLD-MCNC: 9.3 G/DL (ref 14–18)
MCH RBC QN AUTO: 31.1 PG (ref 27–33)
MCHC RBC AUTO-ENTMCNC: 32.5 G/DL (ref 33.7–35.3)
MCV RBC AUTO: 95.7 FL (ref 81.4–97.8)
PLATELET # BLD AUTO: 208 K/UL (ref 164–446)
PMV BLD AUTO: 10 FL (ref 9–12.9)
POTASSIUM SERPL-SCNC: 5.4 MMOL/L (ref 3.6–5.5)
RBC # BLD AUTO: 2.99 M/UL (ref 4.7–6.1)
SODIUM SERPL-SCNC: 136 MMOL/L (ref 135–145)
WBC # BLD AUTO: 6.6 K/UL (ref 4.8–10.8)

## 2017-04-16 PROCEDURE — A9270 NON-COVERED ITEM OR SERVICE: HCPCS | Performed by: HOSPITALIST

## 2017-04-16 PROCEDURE — 97161 PT EVAL LOW COMPLEX 20 MIN: CPT

## 2017-04-16 PROCEDURE — 70450 CT HEAD/BRAIN W/O DYE: CPT

## 2017-04-16 PROCEDURE — 700102 HCHG RX REV CODE 250 W/ 637 OVERRIDE(OP): Performed by: HOSPITALIST

## 2017-04-16 PROCEDURE — 700111 HCHG RX REV CODE 636 W/ 250 OVERRIDE (IP): Performed by: HOSPITALIST

## 2017-04-16 PROCEDURE — 5A1D60Z PERFORMANCE OF URINARY FILTRATION, MULTIPLE: ICD-10-PCS | Performed by: INTERNAL MEDICINE

## 2017-04-16 PROCEDURE — 770006 HCHG ROOM/CARE - MED/SURG/GYN SEMI*

## 2017-04-16 PROCEDURE — 80048 BASIC METABOLIC PNL TOTAL CA: CPT

## 2017-04-16 PROCEDURE — 99233 SBSQ HOSP IP/OBS HIGH 50: CPT | Performed by: HOSPITALIST

## 2017-04-16 PROCEDURE — G8979 MOBILITY GOAL STATUS: HCPCS | Mod: CK

## 2017-04-16 PROCEDURE — G8978 MOBILITY CURRENT STATUS: HCPCS | Mod: CK

## 2017-04-16 PROCEDURE — G8980 MOBILITY D/C STATUS: HCPCS | Mod: CK

## 2017-04-16 PROCEDURE — 87340 HEPATITIS B SURFACE AG IA: CPT

## 2017-04-16 PROCEDURE — 90935 HEMODIALYSIS ONE EVALUATION: CPT

## 2017-04-16 PROCEDURE — 86706 HEP B SURFACE ANTIBODY: CPT

## 2017-04-16 PROCEDURE — 85027 COMPLETE CBC AUTOMATED: CPT

## 2017-04-16 PROCEDURE — 700105 HCHG RX REV CODE 258: Performed by: HOSPITALIST

## 2017-04-16 PROCEDURE — 700101 HCHG RX REV CODE 250: Performed by: INTERNAL MEDICINE

## 2017-04-16 RX ORDER — LIDOCAINE HYDROCHLORIDE 10 MG/ML
1 INJECTION, SOLUTION INFILTRATION; PERINEURAL
Status: COMPLETED | OUTPATIENT
Start: 2017-04-16 | End: 2017-04-18

## 2017-04-16 RX ADMIN — ACETAMINOPHEN 650 MG: 325 TABLET, FILM COATED ORAL at 12:40

## 2017-04-16 RX ADMIN — OXYCODONE HYDROCHLORIDE AND ACETAMINOPHEN 1 TABLET: 5; 325 TABLET ORAL at 23:28

## 2017-04-16 RX ADMIN — MULTIVITAMIN TABLET 1 TABLET: TABLET at 09:06

## 2017-04-16 RX ADMIN — CYCLOBENZAPRINE HYDROCHLORIDE 5 MG: 10 TABLET, FILM COATED ORAL at 21:20

## 2017-04-16 RX ADMIN — CALCIUM ACETATE 1334 MG: 667 CAPSULE ORAL at 12:40

## 2017-04-16 RX ADMIN — AMLODIPINE BESYLATE 10 MG: 5 TABLET ORAL at 09:05

## 2017-04-16 RX ADMIN — HYDRALAZINE HYDROCHLORIDE 10 MG: 10 TABLET, FILM COATED ORAL at 21:21

## 2017-04-16 RX ADMIN — LISINOPRIL 40 MG: 20 TABLET ORAL at 20:37

## 2017-04-16 RX ADMIN — MINOXIDIL 2.5 MG: 2.5 TABLET ORAL at 09:14

## 2017-04-16 RX ADMIN — ACETAMINOPHEN 650 MG: 325 TABLET, FILM COATED ORAL at 19:26

## 2017-04-16 RX ADMIN — ACETAMINOPHEN 650 MG: 325 TABLET, FILM COATED ORAL at 04:57

## 2017-04-16 RX ADMIN — PRAVASTATIN SODIUM 10 MG: 20 TABLET ORAL at 21:17

## 2017-04-16 RX ADMIN — MORPHINE SULFATE 3 MG: 4 INJECTION INTRAVENOUS at 19:23

## 2017-04-16 RX ADMIN — VANCOMYCIN HYDROCHLORIDE 1800 MG: 10 INJECTION, POWDER, LYOPHILIZED, FOR SOLUTION INTRAVENOUS at 19:26

## 2017-04-16 RX ADMIN — LIDOCAINE HYDROCHLORIDE 1 ML: 10 INJECTION, SOLUTION INFILTRATION; PERINEURAL at 16:00

## 2017-04-16 RX ADMIN — CALCIUM ACETATE 1334 MG: 667 CAPSULE ORAL at 09:04

## 2017-04-16 RX ADMIN — LABETALOL HYDROCHLORIDE 500 MG: 100 TABLET, FILM COATED ORAL at 20:36

## 2017-04-16 RX ADMIN — CYCLOBENZAPRINE HYDROCHLORIDE 5 MG: 10 TABLET, FILM COATED ORAL at 09:06

## 2017-04-16 RX ADMIN — FUROSEMIDE 80 MG: 40 TABLET ORAL at 19:27

## 2017-04-16 RX ADMIN — LISINOPRIL 40 MG: 20 TABLET ORAL at 09:06

## 2017-04-16 RX ADMIN — CALCIUM ACETATE 1334 MG: 667 CAPSULE ORAL at 19:26

## 2017-04-16 RX ADMIN — CYCLOBENZAPRINE HYDROCHLORIDE 5 MG: 10 TABLET, FILM COATED ORAL at 15:02

## 2017-04-16 RX ADMIN — FUROSEMIDE 80 MG: 40 TABLET ORAL at 09:09

## 2017-04-16 RX ADMIN — TAMSULOSIN HYDROCHLORIDE 0.8 MG: 0.4 CAPSULE ORAL at 09:05

## 2017-04-16 RX ADMIN — TRAZODONE HYDROCHLORIDE 150 MG: 50 TABLET ORAL at 23:28

## 2017-04-16 RX ADMIN — LABETALOL HYDROCHLORIDE 500 MG: 100 TABLET, FILM COATED ORAL at 09:08

## 2017-04-16 ASSESSMENT — ENCOUNTER SYMPTOMS
DIZZINESS: 0
HEADACHES: 0
FEVER: 0
PHOTOPHOBIA: 0
COUGH: 0
PALPITATIONS: 0
SHORTNESS OF BREATH: 0
SENSORY CHANGE: 0
BACK PAIN: 0
NAUSEA: 0
STRIDOR: 0
EYE DISCHARGE: 0
EYE PAIN: 0
NERVOUS/ANXIOUS: 0
DEPRESSION: 0
ABDOMINAL PAIN: 0
CHILLS: 0
SPEECH CHANGE: 0
DIARRHEA: 0
NECK PAIN: 1
VOMITING: 0

## 2017-04-16 ASSESSMENT — PAIN SCALES - GENERAL: PAINLEVEL_OUTOF10: 4

## 2017-04-16 ASSESSMENT — GAIT ASSESSMENTS
DISTANCE (FEET): 200
DEVIATION: STEP TO
GAIT LEVEL OF ASSIST: STAND BY ASSIST

## 2017-04-16 NOTE — PROGRESS NOTES
Pt transferred to unit accompanied by his wife and son. Pt sitting up in bed eating dinner and visiting with family. Pt requests pain medication for neck and back pain, medicated per MAR. Pt informed that this RN will be back in to discuss his plan of care.

## 2017-04-16 NOTE — PROGRESS NOTES
"Pharmacy Kinetics Vancomycin Day # 1  2017        Estimated CrCl =  Estimated Creatinine Clearance: 7.4 mL/min (by C-G formula based on Cr of 8.49).     Recent Labs      04/15/17   1615  17   0417   WBC  8.4  6.6   NEUTSPOLYS  84.10*   --    BUN  43*  50*   CREATININE  7.15*  8.49*        Blood pressure 157/55, pulse 77, temperature 36.7 °C (98 °F), resp. rate 18, height 1.727 m (5' 8\"), weight 75.8 kg (167 lb 1.7 oz), SpO2 92 %.    Temp (24hrs), Av.7 °C (98.1 °F), Min:36.4 °C (97.5 °F), Max:37.2 °C (98.9 °F)        A/P 1.  Vancomycin and Unasyn for grm(+) cocci in the blood.  Both renal dosed.   2.  Vancomycin 1800 mg loading dose x1 tonight.   3.  Random level in AM.  Continue to pulse dose until steady state is achieved with a target trough of 12-16 mcg/ml.   4.  Await final ID and sensitivities.        Jose Pace PharmD BCPS              "

## 2017-04-16 NOTE — H&P
PRIMARY CARE:  Unknown.    CHIEF COMPLAINT:  Neck pain.    HISTORY OF PRESENT ILLNESS:  The patient is a 76-year-old gentleman with   history of multiple medical problems including O2 dependent COPD; end-stage   renal disease, on hemodialysis as well as recent pericardial effusion.    Patient now presents with complaints of neck pain and shoulder pain.  This   started approximately 2 days ago.  Patient does not know what might be the   cause as he denied any trauma associated with that.  Patient denied any change   in sleeping postures.  Patient denied previous experience like this.  Patient   went to urgent care where he was evaluated and was found to have temperature   of 100.2 degrees in the forehead, but the oral thermometer right afterwards   said he was within normal limits.  Consequently, he was directed to come to   the hospital for further evaluation.  Here, the patient was afebrile, but the   neck pain was persistent and patient's blood pressure was significantly   elevated with blood pressure of 212/86.  Patient denied any focal neurological   complaints, otherwise.  Patient denied any headaches, vision changes or   speech changes.  Patient denied any nausea, vomiting or diarrhea.    PAST MEDICAL HISTORY:  End-stage renal disease, on hemodialysis; COPD with 2   liter O2 dependence; pericardial effusion; upper GI bleed; hypertension; BPH;   basal cell carcinoma; congestive heart failure with preserved ejection   fraction; peripheral artery disease; sleep apnea, on O2.    PAST SURGICAL HISTORY:  Left subclavian stent placement.    FAMILY HISTORY:  Significant for a lung disease of unknown type.    SOCIAL HISTORY:  No alcohol, tobacco or IV drug use.    ALLERGIES:  NKDA.    MEDICATIONS:  Amlodipine 10 mg daily, B complex, _____C folate 1 tab daily,   Symbicort 2 puffs b.i.d., calcium acetate 667 mg 2 capsules 3 times a day with   meals, Epogen 2000 units with dialysis, fluticasone 50 mcg per actuation 2    sprays to naris daily, furosemide 40 mg 2 tabs b.i.d., labetalol 500 mg   b.i.d., lisinopril 40 mg b.i.d., minoxidil 2.5 mg daily, omeprazole 40 mg   daily, Zofran 4 mg q. 6 hours p.r.n. for nausea and vomiting, Percocet 5/325   one tab b.i.d. p.r.n. for severe pain, pravastatin 10 mg at bedtime,   tamsulosin 0.4 mg 2 tabs daily, trazodone 150 mg 2 tabs at bedtime.    REVIEW OF SYSTEMS:  Patient denied recent ill encounters.  No upper   respiratory symptoms of cough, sore throat, rhinorrhea, chest pain,   palpitations, shortness of breath, nausea, vomiting, diarrhea.  No hemoptysis   or hematemesis.  No GI or  dysfunction or GI or  bleed.  No focal   neurologic complaints.  No rashes or ulcerations.    PHYSICAL EXAMINATION:  GENERAL:  Well-developed white male in moderate discomfort.  VITAL SIGNS:  Temperature 37.2, heart rate 75, respirations 20, blood pressure   212/86, saturating 98% on room air.  HEENT:  Normocephalic, atraumatic, pupils are equal, round and reactive to   light, anicteric sclerae.  Extraocular muscles are intact.  NECK:  No cervical lymphadenopathy appreciated.  Oropharynx is small with   Mallampati score of 3.  Mucosa is moist, clear without ulcerations or   exudates.  PULMONARY:  Clear to auscultation bilaterally.  CARDIOVASCULAR:  Regular rate and rhythm with a II to III/VI systolic ejection   murmur, no rubs or gallops appreciated.  ABDOMEN:  Positive bowel sounds, soft, nontender, nondistended.  EXTREMITIES:  No clubbing, cyanosis or edema.  NEUROLOGIC:  Cranial nerves II-XII intact.  SKIN:  No erythema or ulcerations.    LABORATORY DATA:  WBC of 8.4, hemoglobin 10.3, platelets 227.  Sodium 134,   potassium 5.2, chloride 91, bicarbonate 27, BUN 43, creatinine 7.15, glucose   106, calcium 9.6, alkaline phosphatase 101, AST 18, ALT 15, total bilirubin   0.9.  Troponin negative x1.  BNP of 1583.  PT of 15.6, INR 1.26, PTT of 37.6.    IMAGING:  EKG shows right bundle-branch block.  Chest  x-ray shows cardiomegaly   and left subclavian stent.  Chest CT angiogram shows moderate pericardial   effusion with pulmonary edema and trace pleural effusions, persistent   ill-defined hyperenhancement of the left hepatic lobe, bilateral renal   cortical thinning with multiple cysts.    ASSESSMENT AND PLAN:  A 76-year-old gentleman with history of multiple medical   problems, now with neck pain of unclear etiology and malignant hypertension.    PLAN:  1.  Neck pain:  We will provide muscle relaxants as well as anti-inflammatory   and judicious use of narcotics.  We will also use conservative measures of   heat pack or cool pack, whichever is more effective.  We will also provide   PT/OT evaluation for gentle exercises.  2.  Malignant hypertension:  We will resume home medication regimen and also   add hydralazine and Isordil to the regimen.  3.  End-stage renal disease with hemodialysis dependence:  Consult nephrology.  4.  COPD with 2 liter O2 dependence:  Provide supplementary oxygen,   respiratory protocol, incentive spirometry, Pneumovax and Fluvax as   appropriate.  5.  Pericardial effusion:  We will check repeat echocardiogram and contact   cardiology as appropriate.  6.  CHF with preserved ejection fraction:  We will provide cautious fluid   management.  7.  Sleep apnea:  Encourage CPAP compliance.  8.  Stable issues:  Medical history including peripheral artery disease, basal   cell carcinoma, BPH, upper GI bleed.  9.  Preventives:  Provide DVT prophylaxis and stool softener.    DISPOSITION:  Complex and guarded.       ____________________________________     MD LOKI RODRÍGUEZ / DELANO    DD:  04/15/2017 20:26:36  DT:  04/15/2017 21:08:29    D#:  [id number]  Job#:  [id number]

## 2017-04-16 NOTE — DISCHARGE PLANNING
Patient discussed in morning rounds.  Patient reported lives at home with her spouse and her discharge plan is to return home when medically able. No current SS needs noted.

## 2017-04-17 ENCOUNTER — APPOINTMENT (OUTPATIENT)
Dept: RADIOLOGY | Facility: MEDICAL CENTER | Age: 77
DRG: 539 | End: 2017-04-17
Attending: HOSPITALIST
Payer: MEDICARE

## 2017-04-17 ENCOUNTER — TELEPHONE (OUTPATIENT)
Dept: CARDIOLOGY | Facility: MEDICAL CENTER | Age: 77
End: 2017-04-17

## 2017-04-17 LAB
ANION GAP SERPL CALC-SCNC: 12 MMOL/L (ref 0–11.9)
BASOPHILS # BLD AUTO: 0.5 % (ref 0–1.8)
BASOPHILS # BLD: 0.03 K/UL (ref 0–0.12)
BUN SERPL-MCNC: 43 MG/DL (ref 8–22)
CALCIUM SERPL-MCNC: 9.1 MG/DL (ref 8.4–10.2)
CHLORIDE SERPL-SCNC: 95 MMOL/L (ref 96–112)
CO2 SERPL-SCNC: 28 MMOL/L (ref 20–33)
CREAT SERPL-MCNC: 7.42 MG/DL (ref 0.5–1.4)
EOSINOPHIL # BLD AUTO: 0.16 K/UL (ref 0–0.51)
EOSINOPHIL NFR BLD: 2.4 % (ref 0–6.9)
ERYTHROCYTE [DISTWIDTH] IN BLOOD BY AUTOMATED COUNT: 51.3 FL (ref 35.9–50)
GFR SERPL CREATININE-BSD FRML MDRD: 7 ML/MIN/1.73 M 2
GLUCOSE SERPL-MCNC: 106 MG/DL (ref 65–99)
HCT VFR BLD AUTO: 28.6 % (ref 42–52)
HGB BLD-MCNC: 9.2 G/DL (ref 14–18)
IMM GRANULOCYTES # BLD AUTO: 0.03 K/UL (ref 0–0.11)
IMM GRANULOCYTES NFR BLD AUTO: 0.5 % (ref 0–0.9)
INR PPP: 1.1 (ref 0.87–1.13)
LYMPHOCYTES # BLD AUTO: 0.43 K/UL (ref 1–4.8)
LYMPHOCYTES NFR BLD: 6.5 % (ref 22–41)
MCH RBC QN AUTO: 30.6 PG (ref 27–33)
MCHC RBC AUTO-ENTMCNC: 32.2 G/DL (ref 33.7–35.3)
MCV RBC AUTO: 95 FL (ref 81.4–97.8)
MONOCYTES # BLD AUTO: 0.44 K/UL (ref 0–0.85)
MONOCYTES NFR BLD AUTO: 6.7 % (ref 0–13.4)
NEUTROPHILS # BLD AUTO: 5.48 K/UL (ref 1.82–7.42)
NEUTROPHILS NFR BLD: 83.4 % (ref 44–72)
NRBC # BLD AUTO: 0 K/UL
NRBC BLD AUTO-RTO: 0 /100 WBC
PLATELET # BLD AUTO: 255 K/UL (ref 164–446)
PMV BLD AUTO: 10.1 FL (ref 9–12.9)
POTASSIUM SERPL-SCNC: 5 MMOL/L (ref 3.6–5.5)
PROTHROMBIN TIME: 14 SEC (ref 12–14.6)
RBC # BLD AUTO: 3.01 M/UL (ref 4.7–6.1)
SODIUM SERPL-SCNC: 135 MMOL/L (ref 135–145)
VANCOMYCIN TIMED 2584: 28.4 UG/ML
WBC # BLD AUTO: 6.6 K/UL (ref 4.8–10.8)

## 2017-04-17 PROCEDURE — A9270 NON-COVERED ITEM OR SERVICE: HCPCS | Performed by: HOSPITALIST

## 2017-04-17 PROCEDURE — 94760 N-INVAS EAR/PLS OXIMETRY 1: CPT

## 2017-04-17 PROCEDURE — 80202 ASSAY OF VANCOMYCIN: CPT

## 2017-04-17 PROCEDURE — 99232 SBSQ HOSP IP/OBS MODERATE 35: CPT | Performed by: HOSPITALIST

## 2017-04-17 PROCEDURE — 700105 HCHG RX REV CODE 258: Performed by: HOSPITALIST

## 2017-04-17 PROCEDURE — 700101 HCHG RX REV CODE 250: Performed by: INTERNAL MEDICINE

## 2017-04-17 PROCEDURE — 85610 PROTHROMBIN TIME: CPT

## 2017-04-17 PROCEDURE — 700111 HCHG RX REV CODE 636 W/ 250 OVERRIDE (IP): Performed by: INTERNAL MEDICINE

## 2017-04-17 PROCEDURE — 87040 BLOOD CULTURE FOR BACTERIA: CPT

## 2017-04-17 PROCEDURE — 700102 HCHG RX REV CODE 250 W/ 637 OVERRIDE(OP): Performed by: HOSPITALIST

## 2017-04-17 PROCEDURE — 770006 HCHG ROOM/CARE - MED/SURG/GYN SEMI*

## 2017-04-17 PROCEDURE — 90935 HEMODIALYSIS ONE EVALUATION: CPT

## 2017-04-17 PROCEDURE — 85025 COMPLETE CBC W/AUTO DIFF WBC: CPT

## 2017-04-17 PROCEDURE — 80048 BASIC METABOLIC PNL TOTAL CA: CPT

## 2017-04-17 PROCEDURE — 700111 HCHG RX REV CODE 636 W/ 250 OVERRIDE (IP): Performed by: HOSPITALIST

## 2017-04-17 RX ORDER — HYDRALAZINE HYDROCHLORIDE 20 MG/ML
10 INJECTION INTRAMUSCULAR; INTRAVENOUS EVERY 4 HOURS PRN
Status: DISCONTINUED | OUTPATIENT
Start: 2017-04-17 | End: 2017-04-20 | Stop reason: HOSPADM

## 2017-04-17 RX ORDER — HYDRALAZINE HYDROCHLORIDE 25 MG/1
25 TABLET, FILM COATED ORAL EVERY 8 HOURS
Status: DISCONTINUED | OUTPATIENT
Start: 2017-04-17 | End: 2017-04-20 | Stop reason: HOSPADM

## 2017-04-17 RX ORDER — HEPARIN SODIUM 1000 [USP'U]/ML
1500 INJECTION, SOLUTION INTRAVENOUS; SUBCUTANEOUS
Status: DISCONTINUED | OUTPATIENT
Start: 2017-04-17 | End: 2017-04-20 | Stop reason: HOSPADM

## 2017-04-17 RX ORDER — CLOPIDOGREL BISULFATE 75 MG/1
75 TABLET ORAL DAILY
Status: DISCONTINUED | OUTPATIENT
Start: 2017-04-17 | End: 2017-04-20 | Stop reason: HOSPADM

## 2017-04-17 RX ADMIN — HEPARIN SODIUM 1500 UNITS: 1000 INJECTION, SOLUTION INTRAVENOUS; SUBCUTANEOUS at 11:42

## 2017-04-17 RX ADMIN — MINOXIDIL 2.5 MG: 2.5 TABLET ORAL at 09:00

## 2017-04-17 RX ADMIN — ISOSORBIDE DINITRATE 10 MG: 10 TABLET ORAL at 21:01

## 2017-04-17 RX ADMIN — CEFTRIAXONE 2 G: 2 INJECTION, POWDER, FOR SOLUTION INTRAMUSCULAR; INTRAVENOUS at 21:09

## 2017-04-17 RX ADMIN — HYDRALAZINE HYDROCHLORIDE 10 MG: 10 TABLET, FILM COATED ORAL at 14:38

## 2017-04-17 RX ADMIN — OMEPRAZOLE 20 MG: 20 CAPSULE, DELAYED RELEASE ORAL at 08:57

## 2017-04-17 RX ADMIN — TRAZODONE HYDROCHLORIDE 150 MG: 50 TABLET ORAL at 21:03

## 2017-04-17 RX ADMIN — LIDOCAINE HYDROCHLORIDE 1 ML: 10 INJECTION, SOLUTION INFILTRATION; PERINEURAL at 11:41

## 2017-04-17 RX ADMIN — MULTIVITAMIN TABLET 1 TABLET: TABLET at 08:59

## 2017-04-17 RX ADMIN — CALCIUM ACETATE 1334 MG: 667 CAPSULE ORAL at 18:48

## 2017-04-17 RX ADMIN — AMLODIPINE BESYLATE 10 MG: 5 TABLET ORAL at 08:59

## 2017-04-17 RX ADMIN — LISINOPRIL 40 MG: 20 TABLET ORAL at 21:01

## 2017-04-17 RX ADMIN — CYCLOBENZAPRINE HYDROCHLORIDE 5 MG: 10 TABLET, FILM COATED ORAL at 08:58

## 2017-04-17 RX ADMIN — LABETALOL HYDROCHLORIDE 500 MG: 100 TABLET, FILM COATED ORAL at 20:59

## 2017-04-17 RX ADMIN — MORPHINE SULFATE 3 MG: 4 INJECTION INTRAVENOUS at 21:08

## 2017-04-17 RX ADMIN — MORPHINE SULFATE 3 MG: 4 INJECTION INTRAVENOUS at 14:43

## 2017-04-17 RX ADMIN — FUROSEMIDE 80 MG: 40 TABLET ORAL at 08:58

## 2017-04-17 RX ADMIN — LABETALOL HYDROCHLORIDE 500 MG: 100 TABLET, FILM COATED ORAL at 08:58

## 2017-04-17 RX ADMIN — TAMSULOSIN HYDROCHLORIDE 0.8 MG: 0.4 CAPSULE ORAL at 08:58

## 2017-04-17 RX ADMIN — FUROSEMIDE 80 MG: 40 TABLET ORAL at 18:49

## 2017-04-17 RX ADMIN — LISINOPRIL 40 MG: 20 TABLET ORAL at 08:59

## 2017-04-17 RX ADMIN — PRAVASTATIN SODIUM 10 MG: 20 TABLET ORAL at 21:02

## 2017-04-17 RX ADMIN — CYCLOBENZAPRINE HYDROCHLORIDE 5 MG: 10 TABLET, FILM COATED ORAL at 14:43

## 2017-04-17 RX ADMIN — CLOPIDOGREL BISULFATE 75 MG: 75 TABLET, FILM COATED ORAL at 16:14

## 2017-04-17 RX ADMIN — OXYCODONE HYDROCHLORIDE AND ACETAMINOPHEN 1 TABLET: 5; 325 TABLET ORAL at 05:29

## 2017-04-17 RX ADMIN — ACETAMINOPHEN 650 MG: 325 TABLET, FILM COATED ORAL at 18:48

## 2017-04-17 RX ADMIN — HYDRALAZINE HYDROCHLORIDE 10 MG: 10 TABLET, FILM COATED ORAL at 05:00

## 2017-04-17 RX ADMIN — HYDRALAZINE HYDROCHLORIDE 25 MG: 25 TABLET, FILM COATED ORAL at 22:36

## 2017-04-17 RX ADMIN — CALCIUM ACETATE 1334 MG: 667 CAPSULE ORAL at 08:57

## 2017-04-17 RX ADMIN — ISOSORBIDE DINITRATE 10 MG: 10 TABLET ORAL at 14:39

## 2017-04-17 RX ADMIN — CYCLOBENZAPRINE HYDROCHLORIDE 5 MG: 10 TABLET, FILM COATED ORAL at 20:59

## 2017-04-17 RX ADMIN — CEFTRIAXONE 2 G: 2 INJECTION, POWDER, FOR SOLUTION INTRAMUSCULAR; INTRAVENOUS at 00:56

## 2017-04-17 RX ADMIN — EPOETIN ALFA 2000 UNITS: 2000 SOLUTION INTRAVENOUS; SUBCUTANEOUS at 11:42

## 2017-04-17 ASSESSMENT — ENCOUNTER SYMPTOMS
VOMITING: 0
PHOTOPHOBIA: 0
BACK PAIN: 1
CHILLS: 0
SHORTNESS OF BREATH: 0
NECK PAIN: 0
DEPRESSION: 0
STRIDOR: 0
EYE DISCHARGE: 0
WEAKNESS: 1
DIZZINESS: 0
NAUSEA: 0
COUGH: 0
SENSORY CHANGE: 0
PALPITATIONS: 0
HEADACHES: 0
NERVOUS/ANXIOUS: 0
SPEECH CHANGE: 0
ABDOMINAL PAIN: 0
DIARRHEA: 0
EYE PAIN: 0
NECK PAIN: 1
FEVER: 0

## 2017-04-17 ASSESSMENT — PAIN SCALES - GENERAL
PAINLEVEL_OUTOF10: 5
PAINLEVEL_OUTOF10: 7

## 2017-04-17 NOTE — PROGRESS NOTES
"0700 Report received from [facility] nurse, Shravan, at bedside. Pt is resting in bed.    1130 Dialysis at bedside.    1430 HD done at bedside, pt denies discomfort at this time, .    1510 \"Give hydralazine IJ 10 mg PRN Q 4hrs for / DBP>95, continue pt's home med Plavix 75mg PO daily\" per Dr. Sutton.    1700 Pt is informed about MRI w contrast tomorrow morning @0800, dialysis after that.    1900 Report given to [facility] nurse, Nadeen, at bedside.  "

## 2017-04-17 NOTE — PROGRESS NOTES
Hospital Medicine Progress Note, Adult, Complex               Author: Yandel Sutton Date & Time created: 4/17/2017  8:46 AM     Interval History:  ID: 75yo M with hx of COPD and ESRD on HD.  Here with neck pain.  He has blood cultures positive for gram positive bacteria.    Today: Remains with upper back pain, muscular in complaint between the shoulder blades to base of his neck.  No nuch rigidity.  No headache nor photophobia.  He is aware he has bacteremia and will need IV antibiotics.  Concern of dialysis access as etiology.  I have discussed this with Dr Whittaker in consultation.  We are ordering an MRI thoracic spine to rule out abscess.  Patient is alert with clear speech.  Wife is at his bedside.      Review of Systems:  Review of Systems   Constitutional: Negative for fever, chills and malaise/fatigue.   HENT: Negative for congestion, ear discharge, ear pain and hearing loss.    Eyes: Negative for photophobia, pain and discharge.   Respiratory: Negative for cough, shortness of breath and stridor.    Cardiovascular: Positive for leg swelling (chronic). Negative for chest pain and palpitations.   Gastrointestinal: Negative for nausea, vomiting, abdominal pain and diarrhea.   Genitourinary: Negative for dysuria and hematuria.   Musculoskeletal: Positive for back pain (uupper back between shoulder blades). Negative for joint pain and neck pain.   Skin: Negative for rash.   Neurological: Negative for dizziness, sensory change, speech change and headaches.   Psychiatric/Behavioral: Negative for depression. The patient is not nervous/anxious.        Physical Exam:  Physical Exam   Constitutional: He is oriented to person, place, and time. He appears well-developed and well-nourished. No distress.   HENT:   Head: Normocephalic and atraumatic.   Nose: Nose normal.   Mouth/Throat: Oropharynx is clear and moist.   Eyes: Conjunctivae and EOM are normal. Right eye exhibits no discharge. Left eye exhibits no discharge. No  scleral icterus.   Neck: Normal range of motion. No tracheal deviation present.   Cardiovascular: Normal rate, regular rhythm, normal heart sounds and intact distal pulses.    No murmur heard.  Pulses:       Radial pulses are 2+ on the left side.        Dorsalis pedis pulses are 1+ on the right side, and 1+ on the left side.   Pulmonary/Chest: Effort normal and breath sounds normal. No respiratory distress. He has no wheezes.   Abdominal: Soft. Bowel sounds are normal. He exhibits no distension. There is no tenderness.   Musculoskeletal: He exhibits edema.   Left forearm with palpable thrill in AV fistula   Neurological: He is alert and oriented to person, place, and time. No cranial nerve deficit.   Skin: Skin is warm. He is not diaphoretic.   Psychiatric: He has a normal mood and affect. His behavior is normal. Thought content normal.   Vitals reviewed.      Labs:        Invalid input(s): [id number]  Recent Labs      04/15/17   1615   TROPONINI  0.02   BNPBTYPENAT  1583*     Recent Labs      04/15/17   1615  04/16/17   0417   SODIUM  134*  136   POTASSIUM  5.2  5.4   CHLORIDE  91*  96   CO2  27  28   BUN  43*  50*   CREATININE  7.15*  8.49*   CALCIUM  9.6  9.0     Recent Labs      04/15/17   1615  04/16/17   0417   ALTSGPT  15   --    ASTSGOT  18   --    ALKPHOSPHAT  101*   --    TBILIRUBIN  0.9   --    LIPASE  29   --    GLUCOSE  106*  82     Recent Labs      04/15/17   1615  04/16/17   0417  04/17/17   0759   RBC  3.36*  2.99*  3.01*   HEMOGLOBIN  10.3*  9.3*  9.2*   HEMATOCRIT  32.6*  28.6*  28.6*   PLATELETCT  227  208  255   PROTHROMBTM  15.6*   --    --    APTT  37.6*   --    --    INR  1.26*   --    --      Recent Labs      04/15/17   1615  04/16/17   0417  04/17/17   0759   WBC  8.4  6.6  6.6   NEUTSPOLYS  84.10*   --   83.40*   LYMPHOCYTES  6.00*   --   6.50*   MONOCYTES  8.80   --   6.70   EOSINOPHILS  0.40   --   2.40   BASOPHILS  0.20   --   0.50   ASTSGOT  18   --    --    ALTSGPT  15   --    --     ALKPHOSPHAT  101*   --    --    TBILIRUBIN  0.9   --    --            Hemodynamics:  Temp (24hrs), Av.8 °C (98.3 °F), Min:36.7 °C (98 °F), Max:37 °C (98.6 °F)  Temperature: 36.8 °C (98.3 °F)  Pulse  Av.4  Min: 58  Max: 85   Blood Pressure : (!) 166/62 mmHg     Respiratory:    Respiration: 18, Pulse Oximetry: 89 %        RUL Breath Sounds: Clear, RML Breath Sounds: Diminished, RLL Breath Sounds: Diminished, SILVANA Breath Sounds: Clear, LLL Breath Sounds: Diminished  Fluids:    Intake/Output Summary (Last 24 hours) at 17 0846  Last data filed at 17 1847   Gross per 24 hour   Intake   1080 ml   Output   2500 ml   Net  -1420 ml        GI/Nutrition:  Orders Placed This Encounter   Procedures   • Diet Order     Standing Status: Standing      Number of Occurrences: 1      Standing Expiration Date:      Order Specific Question:  Diet:     Answer:  Renal [8]     Medical Decision Making, by Problem:  Active Hospital Problems    Diagnosis   • Acute upper back/base of neck pain [M54.2]  - Point tender between shoulder blades  -  MRI thoracic spine ordered  - flexeril, pain management     • HTN (hypertension), malignant [I10]  - monitor vitals  - minoxidil, amlodipine, lisinopril, tamsulosin, hydralazine, isosorbide  - IV hydralazine added       *  Bacteremia:         - repeat blood cultures        - Positive for Gram positive cocci on 4/15 cultures.  Streptococcus        - just had recent echo w/o mention of vegetations.  Moderate pericardial effusion w/o tamponade        - I have consulted ID and discussed care.        - MRI thoracic spine      *  ESRD on hemodialysis        - nephrology consulting      *  Dyslipidemia:        - pravastatin      *  Anemia of chronic kidney disease       - am cbc.      Medications reviewed and Labs reviewed  Poon catheter: No Poon      DVT Prophylaxis: Heparin      Antibiotics: Treating active infection/contamination beyond 24 hours perioperative coverage

## 2017-04-17 NOTE — PROGRESS NOTES
Hospital Medicine Progress Note, Adult, Complex               Author: Yandel Sutton Date & Time created: 4/16/2017  5:41 PM     Interval History:  ID: 77yo M with hx of COPD and ESRD on HD.  Here with neck pain.  He has blood cultures positive for gram positive bacteria.    Today: Awake and alert.  Speech clear and in full sentences.  Wife at bedside.  Patient w/o photophobia, headache nor nuchal rigidity.  He has neck tenderness but can touch his chin to his chest slowly.  He denies any open wounds/sores.  I alerted him to his positive blood cultures and he states a history of prior bacteremia w/ staph.      Review of Systems:  Review of Systems   Constitutional: Negative for fever, chills and malaise/fatigue.   HENT: Negative for congestion, ear discharge, ear pain and hearing loss.    Eyes: Negative for photophobia, pain and discharge.   Respiratory: Negative for cough, shortness of breath and stridor.    Cardiovascular: Positive for leg swelling (chronic). Negative for chest pain and palpitations.   Gastrointestinal: Negative for nausea, vomiting, abdominal pain and diarrhea.   Genitourinary: Negative for dysuria and hematuria.   Musculoskeletal: Positive for neck pain. Negative for back pain and joint pain.   Skin: Negative for rash.   Neurological: Negative for dizziness, sensory change, speech change and headaches.   Psychiatric/Behavioral: Negative for depression. The patient is not nervous/anxious.        Physical Exam:  Physical Exam   Constitutional: He is oriented to person, place, and time. He appears well-developed and well-nourished. No distress.   HENT:   Head: Normocephalic and atraumatic.   Nose: Nose normal.   Mouth/Throat: Oropharynx is clear and moist.   Eyes: Conjunctivae and EOM are normal. Right eye exhibits no discharge. Left eye exhibits no discharge. No scleral icterus.   Neck: Normal range of motion. No tracheal deviation present.   Cardiovascular: Normal rate, regular rhythm, normal  heart sounds and intact distal pulses.    No murmur heard.  Pulses:       Radial pulses are 2+ on the left side.        Dorsalis pedis pulses are 1+ on the right side, and 1+ on the left side.   Pulmonary/Chest: Effort normal and breath sounds normal. No respiratory distress. He has no wheezes.   Abdominal: Soft. Bowel sounds are normal. He exhibits no distension. There is no tenderness.   Musculoskeletal: He exhibits edema.   Left forearm with palpable thrill in AV fistula   Neurological: He is alert and oriented to person, place, and time. No cranial nerve deficit.   Skin: Skin is warm. He is not diaphoretic.   Psychiatric: He has a normal mood and affect. His behavior is normal. Thought content normal.   Vitals reviewed.      Labs:        Invalid input(s): [id number]  Recent Labs      04/15/17   1615   TROPONINI  0.02   BNPBTYPENAT  1583*     Recent Labs      04/15/17   1615  04/16/17   0417   SODIUM  134*  136   POTASSIUM  5.2  5.4   CHLORIDE  91*  96   CO2  27  28   BUN  43*  50*   CREATININE  7.15*  8.49*   CALCIUM  9.6  9.0     Recent Labs      04/15/17   1615  04/16/17   0417   ALTSGPT  15   --    ASTSGOT  18   --    ALKPHOSPHAT  101*   --    TBILIRUBIN  0.9   --    LIPASE  29   --    GLUCOSE  106*  82     Recent Labs      04/15/17   1615  04/16/17   0417   RBC  3.36*  2.99*   HEMOGLOBIN  10.3*  9.3*   HEMATOCRIT  32.6*  28.6*   PLATELETCT  227  208   PROTHROMBTM  15.6*   --    APTT  37.6*   --    INR  1.26*   --      Recent Labs      04/15/17   1615  04/16/17   0417   WBC  8.4  6.6   NEUTSPOLYS  84.10*   --    LYMPHOCYTES  6.00*   --    MONOCYTES  8.80   --    EOSINOPHILS  0.40   --    BASOPHILS  0.20   --    ASTSGOT  18   --    ALTSGPT  15   --    ALKPHOSPHAT  101*   --    TBILIRUBIN  0.9   --            Hemodynamics:  Temp (24hrs), Av.6 °C (97.9 °F), Min:36.4 °C (97.5 °F), Max:36.8 °C (98.2 °F)  Temperature: 36.7 °C (98 °F)  Pulse  Av.7  Min: 58  Max: 78Heart Rate (Monitored): 69  Blood  Pressure : 157/55 mmHg, NIBP: (!) 179/80 mmHg     Respiratory:    Respiration: 18, Pulse Oximetry: 92 %, O2 Daily Delivery Respiratory : Silicone Nasal Cannula        RUL Breath Sounds: Clear, RML Breath Sounds: Diminished, RLL Breath Sounds: Diminished, SILVANA Breath Sounds: Clear, LLL Breath Sounds: Diminished  Fluids:    Intake/Output Summary (Last 24 hours) at 04/16/17 1741  Last data filed at 04/16/17 1200   Gross per 24 hour   Intake    520 ml   Output      0 ml   Net    520 ml        GI/Nutrition:  Orders Placed This Encounter   Procedures   • Diet Order     Standing Status: Standing      Number of Occurrences: 1      Standing Expiration Date:      Order Specific Question:  Diet:     Answer:  Renal [8]     Medical Decision Making, by Problem:  Active Hospital Problems    Diagnosis   • Acute neck pain [M54.2]  - No point tenderness.    - if worsens may need MRI spine  - flexeril, pain management  - if ongoing fever or worsening may need LP     • HTN (hypertension), malignant [I10]  - monitor vitals  - minoxidil, amlodipine, lisinopril, tamsulosin, hydralazine, isosorbide       *  Bacteremia:         - repeat blood cultures        - Positive for Gram positive cocci on 4/15 cultures        - just had recent echo w/o mention of vegetations.  May need repeat        - I have consulted ID to see in am.      *  ESRD on hemodialysis        - nephrology consulting      *  Dyslipidemia:        - pravastatin      *  Anemia of chronic kidney disease       - am cbc.      Medications reviewed, Labs reviewed and Radiology images reviewed  Poon catheter: No Poon      DVT Prophylaxis: Heparin      Antibiotics: Treating active infection/contamination beyond 24 hours perioperative coverage

## 2017-04-17 NOTE — CONSULTS
DATE OF SERVICE:  04/16/2017    REASON FOR CONSULTATION:  End-stage renal disease.    HISTORY OF PRESENT ILLNESS:  The patient is a 76-year-old gentleman with   history of end-stage renal disease on hemodialysis on a Monday, Wednesday and   Friday schedule.  Patient did receive his dialysis on Friday and does report   on Thursday, developed neck pain going on to shoulders which was quite severe.    Patient had presented with the above complaints to the emergency room and   was noted to have elevated blood pressure, systolic of 212/86.  Patient   underwent CT angiogram for evaluation for aortic dissection, which was   negative.  Patient was found to have pericardial effusion.  Patient at present   does report feeling unwell, did have blood cultures drawn, which are   preliminary positive, identification and sensitivity is pending.  Patient at   present reports had been treated with morphine for his neck pain and shoulder   pain which is markedly improved.  Denies any headaches or any focal weakness.    Denies any vision problems or any speech changes, denies any chest pain or   shortness of breath.  Denies any nausea, vomiting or diarrhea.    PAST MEDICAL HISTORY:  End-stage renal disease, on hemodialysis, COPD with 2   liter oxygen dependence, history of pericardial effusion, history of upper GI   bleed, history of hypertension, history of BPH, history of basal cell   carcinoma, history of congestive heart failure, peripheral artery disease,   sleep apnea.    PAST SURGICAL HISTORY:  Significant for left subclavian stent placement.    ALLERGIES:  No known drug allergies.    FAMILY HISTORY:  Not known.    SOCIAL HISTORY:  Significant for patient being .  No smoking, alcohol   or drug use.  Patient's wife is at the bedside.    MEDICATIONS:  Amlodipine, B complex, vitamin C and folate, Symbicort, calcium   acetate with meals, Epogen 2000 units with dialysis, fluticasone, furosemide   40 mg 2 tablets twice a day,  labetalol 500 twice a day, lisinopril 40 twice a   day, minoxidil 2.5 mg daily, omeprazole 40 mg daily, Zofran p.r.n., Percocet   twice a day for severe pain, pravastatin 10 mg at bedtime, tamsulosin and   trazodone.    REVIEW OF SYSTEMS:  CONSTITUTIONAL:  Patient denies any recent fever or chills.  He did have some   fatigue.  HEENT:  Denies any vision problems or hearing problems.  PULMONARY:  Denies any cough or shortness of breath.  CARDIOVASCULAR:  Denies any chest pain or palpitations, tends to get lower   extremity edema.  GASTROINTESTINAL:  No nausea, vomiting or abdominal pain.  GENITOURINARY:  Minimal urine output, no flank pain, no hematuria or dysuria.  MUSCULOSKELETAL:  As in history of present illness.  NEUROLOGIC:  Denies any numbness or dizziness.    PHYSICAL EXAMINATION:  GENERAL:  He is awake, alert, in no apparent distress.  VITAL SIGNS:  Showed temp of 36.7, heart rate 77, respiratory rate 18, blood   pressure 157/55.  Patient had sats at 92%.  I's and O's recorded 520 in, no   output recorded.  HEENT:  Showed anicteric sclerae, extraocular movements intact.  Pupils are   equal and reactive to light and accommodation.  Oral mucosa is moist.  NECK:  Supple.  Midline trachea.  No elevated JVD.  LUNGS:  Clear to auscultation and percussion bilaterally.  CARDIOVASCULAR SYSTEM:  Regular rate and rhythm, no rub.  ABDOMEN:  Soft and nontender with positive bowel sounds.  EXTREMITIES:  With trace edema bilaterally.  SKIN:  With no rashes or nodules.  NEUROLOGIC:  The patient is awake, alert, moving all 4 extremities.  Left   upper extremity with AV fistula in place with positive bruit and thrill.  No   redness or sign of infection.    LABORATORY DATA:  On April 16th; white count 6.6, hemoglobin 9.3, platelet   count 208.  Patient had a sodium of 136, potassium 5.4, bicarbonate 28, BUN   and creatinine 50 and 8.49.  Patient did have a chest x-ray done, which showed   no consolidation detected.  Vascular  stent could be seen.  No pleural space   process.  There was a cardiac silhouette enlargement.  The patient had a CAT   scan, CT angio done, which showed evidence of pericardial effusion with   pulmonary edema and trace pleural effusion.  No dissection or aneurysm on   aorta, bilateral renal cortical thinning and ill-defined hyperenhancement   within left hepatic lobe.    IMPRESSION AND PLAN:  1.  End-stage renal disease, on hemodialysis.  The patient with azotemia, mild   volume overload and mild hyperkalemia.  Plan, dialysis treatment today, post   contrast exposure.  2.  Anemia.  Monitor hemoglobin and hematocrit.  3.  Volume status- UF with dialysis as tolerated.  4.  Hyperkalemia, which will improve with dialysis.  5.  Hypertension, improving blood pressure control.  We will monitor.  The   patient will be followed closely.    Thank you for involving me in the care of this patient.       ____________________________________     MD CARIDAD HUERTAS / DELANO    DD:  04/16/2017 14:00:02  DT:  04/16/2017 19:16:21    D#:  [id number]  Job#:  [id number]

## 2017-04-17 NOTE — PROGRESS NOTES
HEMODIALYSIS NOTES:     HD today x 2.5 hours per Dr. Key. Initiated at 1600 and ended at 1830. Patient tolerated treatment. Please see paper flowsheet for details.    UF Net: 2,000 mL    Blood returned. Applied gauze and held L AVF site for 15 minutes. Verified no bleeding. Bruit and thrill present post dialysis. Instructions given to Primary RN that if bleeding occurs on the AVF site, change dressing and held the site with pressure. Report given to CRISTOPHER Nieto R.N.

## 2017-04-17 NOTE — TELEPHONE ENCOUNTER
----- Message from Romain Euceda sent at 4/17/2017 10:44 AM PDT -----  Regarding: Pt calling to inform he is currently admitted at [facility],    Patient is calling to inform he is currently admitted in [facility] for high b/p and low grade fever. Any questions he can be reached at [phone].

## 2017-04-17 NOTE — PROGRESS NOTES
"Pharmacy Kinetics Vancomycin Day # 2  2017    Vancomycin loading dose of 1800 mg x1 dose on  at 19:26.    Estimated CrCl =  Estimated Creatinine Clearance: 8.5 mL/min (by C-G formula based on Cr of 7.42).     Recent Labs      04/15/17   1615  17   0417  17   0759   WBC  8.4  6.6  6.6   NEUTSPOLYS  84.10*   --   83.40*   BUN  43*  50*  43*   CREATININE  7.15*  8.49*  7.42*   VANCORANDOM   --    --   28.4        Blood pressure 166/62, pulse 79, temperature 36.8 °C (98.3 °F), resp. rate 18, height 1.727 m (5' 8\"), weight 75.8 kg (167 lb 1.7 oz), SpO2 89 %.    Temp (24hrs), Av.8 °C (98.3 °F), Min:36.7 °C (98 °F), Max:37 °C (98.6 °F)        A/P 1.  Vancomycin random level this AM of 28.4 mcg/ml.  No vancomycin today.   2.  Blood cultures are preliminary strep species.  Await final ID and sensitivities.   3.  Continues on ceftriaxone/Vancomycin for now.  Infectious disease to consult.   4.  Random vancomycin level in AM.        Jose Pace PharmD BCPS              "

## 2017-04-17 NOTE — PROGRESS NOTES
Hd was initiated at 1136.  md notified.  Per pt he agreed with Dr. Key to do 2.5 hours today.  md was notified of this and made aware.  Net uf 2liters.

## 2017-04-17 NOTE — DISCHARGE PLANNING
IHD attempted to meet with patient bedside, he had clinical staff bedside 3x. Will attempt revisit tomorrow.

## 2017-04-17 NOTE — PROGRESS NOTES
Hospital Medicine Progress Note, Adult, Complex               Author: Long Larson Date & Time created: 4/17/2017  3:30 PM     Interval History:  76 year old with ESRD, came in with back/neck pain and found to have bacteremia. Feeling a bit better. HD 2.5 hours yesterday, HD due again today.    Review of Systems:  Review of Systems   Constitutional: Positive for malaise/fatigue.   Musculoskeletal: Positive for back pain and neck pain.   Neurological: Positive for weakness.       Physical Exam:  Physical Exam   Constitutional: He is oriented to person, place, and time. He appears well-developed and well-nourished.   Cardiovascular: Normal rate and regular rhythm.    Musculoskeletal: He exhibits no edema or tenderness.   Neurological: He is alert and oriented to person, place, and time.   Skin: Skin is warm and dry.       Labs:        Invalid input(s): [id number]  Recent Labs      04/15/17   1615   TROPONINI  0.02   BNPBTYPENAT  1583*     Recent Labs      04/15/17   1615  04/16/17   0417  04/17/17   0759   SODIUM  134*  136  135   POTASSIUM  5.2  5.4  5.0   CHLORIDE  91*  96  95*   CO2  27  28  28   BUN  43*  50*  43*   CREATININE  7.15*  8.49*  7.42*   CALCIUM  9.6  9.0  9.1     Recent Labs      04/15/17   1615  04/16/17   0417  04/17/17   0759   ALTSGPT  15   --    --    ASTSGOT  18   --    --    ALKPHOSPHAT  101*   --    --    TBILIRUBIN  0.9   --    --    LIPASE  29   --    --    GLUCOSE  106*  82  106*     Recent Labs      04/15/17   1615  04/16/17   0417  04/17/17   0754  04/17/17   0759   RBC  3.36*  2.99*   --   3.01*   HEMOGLOBIN  10.3*  9.3*   --   9.2*   HEMATOCRIT  32.6*  28.6*   --   28.6*   PLATELETCT  227  208   --   255   PROTHROMBTM  15.6*   --   14.0   --    APTT  37.6*   --    --    --    INR  1.26*   --   1.10   --      Recent Labs      04/15/17   1615  04/16/17   0417  04/17/17   0759   WBC  8.4  6.6  6.6   NEUTSPOLYS  84.10*   --   83.40*   LYMPHOCYTES  6.00*   --   6.50*   MONOCYTES   8.80   --   6.70   EOSINOPHILS  0.40   --   2.40   BASOPHILS  0.20   --   0.50   ASTSGOT  18   --    --    ALTSGPT  15   --    --    ALKPHOSPHAT  101*   --    --    TBILIRUBIN  0.9   --    --            Hemodynamics:  Temp (24hrs), Av.9 °C (98.4 °F), Min:36.7 °C (98.1 °F), Max:37 °C (98.6 °F)  Temperature: 36.8 °C (98.3 °F)  Pulse  Av  Min: 58  Max: 85   Blood Pressure : (!) 210/72 mmHg     Respiratory:    Respiration: 20, Pulse Oximetry: 89 %, O2 Daily Delivery Respiratory : Nasal Cannula        RUL Breath Sounds: Clear, RML Breath Sounds: Diminished, RLL Breath Sounds: Diminished, SILVANA Breath Sounds: Clear, LLL Breath Sounds: Diminished  Fluids:    Intake/Output Summary (Last 24 hours) at 17 1530  Last data filed at 17 1430   Gross per 24 hour   Intake   1200 ml   Output   4900 ml   Net  -3700 ml        GI/Nutrition:  Orders Placed This Encounter   Procedures   • Diet Order     Standing Status: Standing      Number of Occurrences: 1      Standing Expiration Date:      Order Specific Question:  Diet:     Answer:  Renal [8]     Medical Decision Making, by Problem:  Active Hospital Problems    Diagnosis   • Dyslipidemia [E78.5]   • Bacteremia due to Gram-positive bacteria [A49.9]   • Acute neck pain [M54.2]   • HTN (hypertension), malignant [I10]   • Anemia in CKD (chronic kidney disease) [N18.9, D63.1]   • End stage renal disease (HCC) [N18.6]   • BPH (benign prostatic hyperplasia) [N40.0]     1. ESRD - HD Today, continue MWF schedule as tolerated  2. HTN - Likely from volume overload, UF as tolerated  3. Bacteremia    Will follow    Core Measures

## 2017-04-17 NOTE — PROGRESS NOTES
0700 Report received from [facility] nurseDeepa, at bedside. Pt is resting in bed.    1040 Pan from Lab called with critical result of positive blood cx x1 w possible Gram positive cocci at 1015. Dr. Sutton notified of critical lab result at 1040.      1440 Holding IV ABXs ( Unasyn & vanco) due to HD at bedside.    1500 Getting HD.    1900 Morphine 3 mg given for 9/10 of neck and shoulder blades pain. Report given to [facility] nurseShravan, at bedside.

## 2017-04-18 ENCOUNTER — APPOINTMENT (OUTPATIENT)
Dept: RADIOLOGY | Facility: MEDICAL CENTER | Age: 77
DRG: 539 | End: 2017-04-18
Attending: INTERNAL MEDICINE
Payer: MEDICARE

## 2017-04-18 LAB
ANION GAP SERPL CALC-SCNC: 9 MMOL/L (ref 0–11.9)
BACTERIA BLD CULT: ABNORMAL
BUN SERPL-MCNC: 35 MG/DL (ref 8–22)
CALCIUM SERPL-MCNC: 9.3 MG/DL (ref 8.4–10.2)
CHLORIDE SERPL-SCNC: 97 MMOL/L (ref 96–112)
CO2 SERPL-SCNC: 30 MMOL/L (ref 20–33)
CREAT SERPL-MCNC: 6.5 MG/DL (ref 0.5–1.4)
ERYTHROCYTE [DISTWIDTH] IN BLOOD BY AUTOMATED COUNT: 52.5 FL (ref 35.9–50)
GFR SERPL CREATININE-BSD FRML MDRD: 8 ML/MIN/1.73 M 2
GLUCOSE SERPL-MCNC: 96 MG/DL (ref 65–99)
HCT VFR BLD AUTO: 28.1 % (ref 42–52)
HGB BLD-MCNC: 8.9 G/DL (ref 14–18)
MCH RBC QN AUTO: 30.7 PG (ref 27–33)
MCHC RBC AUTO-ENTMCNC: 31.7 G/DL (ref 33.7–35.3)
MCV RBC AUTO: 96.9 FL (ref 81.4–97.8)
PLATELET # BLD AUTO: 256 K/UL (ref 164–446)
PMV BLD AUTO: 9.4 FL (ref 9–12.9)
POTASSIUM SERPL-SCNC: 4.8 MMOL/L (ref 3.6–5.5)
RBC # BLD AUTO: 2.9 M/UL (ref 4.7–6.1)
SIGNIFICANT IND 70042: ABNORMAL
SIGNIFICANT IND 70042: ABNORMAL
SITE SITE: ABNORMAL
SITE SITE: ABNORMAL
SODIUM SERPL-SCNC: 136 MMOL/L (ref 135–145)
SOURCE SOURCE: ABNORMAL
SOURCE SOURCE: ABNORMAL
VANCOMYCIN TIMED 2584: 19.7 UG/ML
WBC # BLD AUTO: 4.5 K/UL (ref 4.8–10.8)

## 2017-04-18 PROCEDURE — 80202 ASSAY OF VANCOMYCIN: CPT

## 2017-04-18 PROCEDURE — A9270 NON-COVERED ITEM OR SERVICE: HCPCS | Performed by: HOSPITALIST

## 2017-04-18 PROCEDURE — 90935 HEMODIALYSIS ONE EVALUATION: CPT

## 2017-04-18 PROCEDURE — 700102 HCHG RX REV CODE 250 W/ 637 OVERRIDE(OP): Performed by: HOSPITALIST

## 2017-04-18 PROCEDURE — 700101 HCHG RX REV CODE 250: Performed by: INTERNAL MEDICINE

## 2017-04-18 PROCEDURE — 80048 BASIC METABOLIC PNL TOTAL CA: CPT

## 2017-04-18 PROCEDURE — 99232 SBSQ HOSP IP/OBS MODERATE 35: CPT | Performed by: FAMILY MEDICINE

## 2017-04-18 PROCEDURE — 700105 HCHG RX REV CODE 258: Performed by: HOSPITALIST

## 2017-04-18 PROCEDURE — 770006 HCHG ROOM/CARE - MED/SURG/GYN SEMI*

## 2017-04-18 PROCEDURE — 72156 MRI NECK SPINE W/O & W/DYE: CPT

## 2017-04-18 PROCEDURE — 85027 COMPLETE CBC AUTOMATED: CPT

## 2017-04-18 PROCEDURE — 700111 HCHG RX REV CODE 636 W/ 250 OVERRIDE (IP): Performed by: FAMILY MEDICINE

## 2017-04-18 PROCEDURE — 700111 HCHG RX REV CODE 636 W/ 250 OVERRIDE (IP): Performed by: HOSPITALIST

## 2017-04-18 PROCEDURE — A9577 INJ MULTIHANCE: HCPCS | Performed by: FAMILY MEDICINE

## 2017-04-18 PROCEDURE — 700117 HCHG RX CONTRAST REV CODE 255: Performed by: FAMILY MEDICINE

## 2017-04-18 RX ADMIN — HYDRALAZINE HYDROCHLORIDE 25 MG: 25 TABLET, FILM COATED ORAL at 15:57

## 2017-04-18 RX ADMIN — ISOSORBIDE DINITRATE 10 MG: 10 TABLET ORAL at 20:21

## 2017-04-18 RX ADMIN — LIDOCAINE HYDROCHLORIDE 1 ML: 10 INJECTION, SOLUTION INFILTRATION; PERINEURAL at 11:40

## 2017-04-18 RX ADMIN — LISINOPRIL 40 MG: 20 TABLET ORAL at 12:27

## 2017-04-18 RX ADMIN — HYDRALAZINE HYDROCHLORIDE 25 MG: 25 TABLET, FILM COATED ORAL at 20:20

## 2017-04-18 RX ADMIN — GADOBENATE DIMEGLUMINE 10 ML: 529 INJECTION, SOLUTION INTRAVENOUS at 09:16

## 2017-04-18 RX ADMIN — ACETAMINOPHEN 650 MG: 325 TABLET, FILM COATED ORAL at 06:06

## 2017-04-18 RX ADMIN — TRAZODONE HYDROCHLORIDE 150 MG: 50 TABLET ORAL at 20:20

## 2017-04-18 RX ADMIN — CALCIUM ACETATE 1334 MG: 667 CAPSULE ORAL at 18:09

## 2017-04-18 RX ADMIN — HYDROMORPHONE HYDROCHLORIDE 0.5 MG: 1 INJECTION, SOLUTION INTRAMUSCULAR; INTRAVENOUS; SUBCUTANEOUS at 20:14

## 2017-04-18 RX ADMIN — ACETAMINOPHEN 650 MG: 325 TABLET, FILM COATED ORAL at 18:10

## 2017-04-18 RX ADMIN — HYDROMORPHONE HYDROCHLORIDE 0.5 MG: 1 INJECTION, SOLUTION INTRAMUSCULAR; INTRAVENOUS; SUBCUTANEOUS at 17:04

## 2017-04-18 RX ADMIN — LABETALOL HYDROCHLORIDE 500 MG: 100 TABLET, FILM COATED ORAL at 20:17

## 2017-04-18 RX ADMIN — CALCIUM ACETATE 1334 MG: 667 CAPSULE ORAL at 12:26

## 2017-04-18 RX ADMIN — SENNOSIDES AND DOCUSATE SODIUM 2 TABLET: 8.6; 5 TABLET ORAL at 20:21

## 2017-04-18 RX ADMIN — PRAVASTATIN SODIUM 10 MG: 20 TABLET ORAL at 20:18

## 2017-04-18 RX ADMIN — ISOSORBIDE DINITRATE 10 MG: 10 TABLET ORAL at 12:26

## 2017-04-18 RX ADMIN — HYDROMORPHONE HYDROCHLORIDE 0.5 MG: 1 INJECTION, SOLUTION INTRAMUSCULAR; INTRAVENOUS; SUBCUTANEOUS at 23:16

## 2017-04-18 RX ADMIN — CYCLOBENZAPRINE HYDROCHLORIDE 5 MG: 10 TABLET, FILM COATED ORAL at 15:57

## 2017-04-18 RX ADMIN — CEFTRIAXONE 2 G: 2 INJECTION, POWDER, FOR SOLUTION INTRAMUSCULAR; INTRAVENOUS at 20:25

## 2017-04-18 RX ADMIN — CYCLOBENZAPRINE HYDROCHLORIDE 5 MG: 10 TABLET, FILM COATED ORAL at 12:31

## 2017-04-18 RX ADMIN — ACETAMINOPHEN 650 MG: 325 TABLET, FILM COATED ORAL at 00:00

## 2017-04-18 RX ADMIN — MORPHINE SULFATE 3 MG: 4 INJECTION INTRAVENOUS at 06:09

## 2017-04-18 RX ADMIN — MULTIVITAMIN TABLET 1 TABLET: TABLET at 12:30

## 2017-04-18 RX ADMIN — HYDRALAZINE HYDROCHLORIDE 25 MG: 25 TABLET, FILM COATED ORAL at 06:06

## 2017-04-18 RX ADMIN — ACETAMINOPHEN 650 MG: 325 TABLET, FILM COATED ORAL at 23:19

## 2017-04-18 RX ADMIN — ISOSORBIDE DINITRATE 10 MG: 10 TABLET ORAL at 15:58

## 2017-04-18 RX ADMIN — FUROSEMIDE 80 MG: 40 TABLET ORAL at 18:09

## 2017-04-18 RX ADMIN — MINOXIDIL 2.5 MG: 2.5 TABLET ORAL at 12:30

## 2017-04-18 RX ADMIN — LABETALOL HYDROCHLORIDE 500 MG: 100 TABLET, FILM COATED ORAL at 12:28

## 2017-04-18 RX ADMIN — FUROSEMIDE 80 MG: 40 TABLET ORAL at 06:06

## 2017-04-18 RX ADMIN — LISINOPRIL 40 MG: 20 TABLET ORAL at 20:18

## 2017-04-18 RX ADMIN — CLOPIDOGREL BISULFATE 75 MG: 75 TABLET, FILM COATED ORAL at 18:00

## 2017-04-18 RX ADMIN — AMLODIPINE BESYLATE 10 MG: 5 TABLET ORAL at 12:27

## 2017-04-18 ASSESSMENT — ENCOUNTER SYMPTOMS
HEMOPTYSIS: 0
FEVER: 0
DIZZINESS: 0
CHILLS: 0
NAUSEA: 0
NECK PAIN: 1
VOMITING: 0
COUGH: 0
PHOTOPHOBIA: 0
BLURRED VISION: 0
TREMORS: 0
ORTHOPNEA: 0
HEARTBURN: 0
WEIGHT LOSS: 0
DIARRHEA: 0
DOUBLE VISION: 0
HEADACHES: 0
PALPITATIONS: 0
TINGLING: 0
BACK PAIN: 1
SHORTNESS OF BREATH: 0
SPUTUM PRODUCTION: 0
WEAKNESS: 1

## 2017-04-18 ASSESSMENT — PAIN SCALES - GENERAL
PAINLEVEL_OUTOF10: 7
PAINLEVEL_OUTOF10: 4
PAINLEVEL_OUTOF10: 7
PAINLEVEL_OUTOF10: 7

## 2017-04-18 NOTE — RESPIRATORY CARE
COPD EDUCATION by COPD CLINICAL EDUCATOR  4/18/2017 at 10:15 AM by Guera Rodrigez     Patient interviewed by COPD education team. Patient refused COPD program at this time. A comprehensive packet including information about COPD, treatments, and smoking cessation given.

## 2017-04-18 NOTE — PROGRESS NOTES
Hospital Medicine Progress Note, Adult, Complex               Author: Long Larson Date & Time created: 4/18/2017  3:05 PM     Interval History:  76 year old with ESRD, came in with back/neck pain and found to have bacteremia. Still with neck pain, MRI positive.    Review of Systems:  Review of Systems   Constitutional: Positive for malaise/fatigue.   Musculoskeletal: Positive for back pain and neck pain.   Neurological: Positive for weakness.       Physical Exam:  Physical Exam   Constitutional: He is oriented to person, place, and time. He appears well-developed and well-nourished.   Cardiovascular: Normal rate and regular rhythm.    Musculoskeletal: He exhibits no edema or tenderness.   Neurological: He is alert and oriented to person, place, and time.   Skin: Skin is warm and dry.       Labs:        Invalid input(s): [id number]  Recent Labs      04/15/17   1615   TROPONINI  0.02   BNPBTYPENAT  1583*     Recent Labs      04/16/17 0417 04/17/17 0759 04/18/17   0734   SODIUM  136  135  136   POTASSIUM  5.4  5.0  4.8   CHLORIDE  96  95*  97   CO2  28  28  30   BUN  50*  43*  35*   CREATININE  8.49*  7.42*  6.50*   CALCIUM  9.0  9.1  9.3     Recent Labs      04/15/17   1615  04/16/17   0417  04/17/17   0759  04/18/17   0734   ALTSGPT  15   --    --    --    ASTSGOT  18   --    --    --    ALKPHOSPHAT  101*   --    --    --    TBILIRUBIN  0.9   --    --    --    LIPASE  29   --    --    --    GLUCOSE  106*  82  106*  96     Recent Labs      04/15/17   1615  04/16/17   0417  04/17/17   0754  04/17/17   0759  04/18/17   0734   RBC  3.36*  2.99*   --   3.01*  2.90*   HEMOGLOBIN  10.3*  9.3*   --   9.2*  8.9*   HEMATOCRIT  32.6*  28.6*   --   28.6*  28.1*   PLATELETCT  227  208   --   255  256   PROTHROMBTM  15.6*   --   14.0   --    --    APTT  37.6*   --    --    --    --    INR  1.26*   --   1.10   --    --      Recent Labs      04/15/17   1615  04/16/17   0417  04/17/17   0759  04/18/17   0734   WBC  8.4   6.6  6.6  4.5*   NEUTSPOLYS  84.10*   --   83.40*   --    LYMPHOCYTES  6.00*   --   6.50*   --    MONOCYTES  8.80   --   6.70   --    EOSINOPHILS  0.40   --   2.40   --    BASOPHILS  0.20   --   0.50   --    ASTSGOT  18   --    --    --    ALTSGPT  15   --    --    --    ALKPHOSPHAT  101*   --    --    --    TBILIRUBIN  0.9   --    --    --            Hemodynamics:  Temp (24hrs), Av.8 °C (98.3 °F), Min:36.7 °C (98 °F), Max:37.1 °C (98.7 °F)  Temperature: 36.7 °C (98 °F)  Pulse  Av.8  Min: 58  Max: 87   Blood Pressure : (!) 167/81 mmHg     Respiratory:    Respiration: 18, Pulse Oximetry: 91 %        RUL Breath Sounds: Clear, RML Breath Sounds: Diminished, RLL Breath Sounds: Diminished, SILVANA Breath Sounds: Clear, LLL Breath Sounds: Clear  Fluids:    Intake/Output Summary (Last 24 hours) at 17 1505  Last data filed at 17 1356   Gross per 24 hour   Intake    460 ml   Output      0 ml   Net    460 ml        GI/Nutrition:  Orders Placed This Encounter   Procedures   • Diet Order     Standing Status: Standing      Number of Occurrences: 1      Standing Expiration Date:      Order Specific Question:  Diet:     Answer:  Renal [8]     Medical Decision Making, by Problem:  Active Hospital Problems    Diagnosis   • Dyslipidemia [E78.5]   • Bacteremia due to Gram-positive bacteria [A49.9]   • Acute neck pain [M54.2]   • HTN (hypertension), malignant [I10]   • Anemia in CKD (chronic kidney disease) [N18.9, D63.1]   • End stage renal disease (HCC) [N18.6]   • BPH (benign prostatic hyperplasia) [N40.0]     1. ESRD - Seen on HD today after MRI, HD again tomorrow  2. HTN -  BP is elevated, on multiple medications.  3. Bacteremia    HD tomorrow    Core Measures

## 2017-04-18 NOTE — DISCHARGE PLANNING
Care Transition Team Assessment    IHD met with patient while he was receiving dialysis. He lives with his wife and use O2 at home through [facility]. He is able to drive himself and other than dialysis, has no other services outside the hospital. He does not expect he will need any other services after discharge.     Information Source  Orientation : Oriented x 4  Information Given By: Patient  Informant's Name: Parmjit  Who is responsible for making decisions for patient? : Patient    Readmission Evaluation  Is this a readmission?: No    Elopement Risk  Legal Hold: No  Ambulatory or Self Mobile in Wheelchair: Yes  Disoriented: No  Psychiatric Symptoms: None  History of Wandering: No  Elopement this Admit: No  Vocalizing Wanting to Leave: No  Displays Behaviors, Body Language Wanting to Leave: No-Not at Risk for Elopement  Elopement Risk: Not at Risk for Elopement    Interdisciplinary Discharge Planning  Does Admitting Nurse Feel This Could be a Complex Discharge?: No  Primary Care Physician:  (no PCP (has a nephrologist [facility]))  Lives with - Patient's Self Care Capacity: Spouse  Patient or legal guardian wants to designate a caregiver (see row info): No  Support Systems: Spouse / Significant Other, Children, Family Member(s)  Housing / Facility: [address]  Do You Take your Prescribed Medications Regularly: Yes  Able to Return to Previous ADL's: Yes  Mobility Issues: No  Prior Services: None  Patient Expects to be Discharged to:: Home  Assistance Needed: No  Durable Medical Equipment: Home Oxygen (at night as needed)  DME Provider / Phone: [facility]    Discharge Preparedness  What is your plan after discharge?: Home with help  What are your discharge supports?: Spouse, Child  Prior Functional Level: Ambulatory, Drives Self, Independent with Activities of Daily Living, Independent with Medication Management  Difficulity with ADLs: None  Difficulity with IADLs: None    Functional Assesment  Prior Functional Level:  Ambulatory, Drives Self, Independent with Activities of Daily Living, Independent with Medication Management    Finances  Financial Barriers to Discharge: No  Prescription Coverage: Yes ([facility] in [facility])    Vision / Hearing Impairment  Vision Impairment : Yes  Right Eye Vision: Impaired  Hearing Impairment : No    Values / Beliefs / Concerns  Values / Beliefs Concerns : No         Domestic Abuse  Have you ever been the victim of abuse or violence?: No  Physical Abuse or Sexual Abuse: No  Verbal Abuse or Emotional Abuse: No  Possible Abuse Reported to:: Not Applicable         Discharge Risks or Barriers  Discharge risks or barriers?: No    Anticipated Discharge Information  Anticipated discharge disposition: Dialysis, Discharge needs currently unknown  Discharge Address: [id number] [place], [place] [address] [address]  Discharge Contact Phone Number: [phone]

## 2017-04-18 NOTE — PROGRESS NOTES
Infectious Disease Progress Note    Author: Ara Whittaker M.D. Date of Service & Time created: 2017  3:40 PM    Chief Complaint:  Chief Complaint   Patient presents with   • Neck Pain   • Back Pain       Interval History:  76 year old male with esrd on d with neck pain and bacteremia  - still has significant pain. No fevers.   Labs Reviewed, Medications Reviewed and Radiology Reviewed.    Review of Systems:  Review of Systems   Constitutional: Positive for malaise/fatigue. Negative for fever.   Respiratory: Negative for cough and shortness of breath.    Cardiovascular: Negative for chest pain and leg swelling.   Gastrointestinal: Negative for nausea, vomiting and diarrhea.   Genitourinary: Negative for dysuria.   Musculoskeletal: Positive for neck pain.        Significant neck pain   Neurological: Negative for headaches.       Hemodynamics:  Temp (24hrs), Av.8 °C (98.3 °F), Min:36.7 °C (98 °F), Max:37.1 °C (98.7 °F)  Temperature: 36.7 °C (98 °F)  Pulse  Av.8  Min: 58  Max: 87   Blood Pressure : (!) 167/81 mmHg       Physical Exam:  Physical Exam   Constitutional: He is oriented to person, place, and time.   Uncomfortable    HENT:   Mouth/Throat: No oropharyngeal exudate.   Eyes: No scleral icterus.   Neck: Neck supple.   Tenderness over the cervical spine   Cardiovascular: Regular rhythm.    No murmur heard.  Pulmonary/Chest: He has no wheezes. He has no rales.   Abdominal: Soft. There is no tenderness. There is no rebound.   Musculoskeletal: He exhibits no edema.   Neurological: He is alert and oriented to person, place, and time.   Vitals reviewed.      Meds:    Current facility-administered medications:   •  heparin 1000 units/mL injection 1,500 Units, 1,500 Units, Intravenous, DIALYSIS PRN, Malinda Key M.D., 1,500 Units at 17 1142  •  clopidogrel (PLAVIX) tablet 75 mg, 75 mg, Oral, DAILY, Yandel Sutton D.O., 75 mg at 17 1614  •  hydrALAZINE (APRESOLINE) injection 10 mg, 10 mg,  Intravenous, Q4HRS PRN, Yandel Sutton D.O.  •  hydrALAZINE (APRESOLINE) tablet 25 mg, 25 mg, Oral, Q8HRS, Yandel Sutton D.O., 25 mg at 04/18/17 0606  •  albuterol (PROVENTIL) 2.5mg/0.5ml nebulizer solution 2.5 mg, 2.5 mg, Nebulization, Q4H PRN (RT), Aime Black M.D.  •  cefTRIAXone (ROCEPHIN) 2 g in  mL IVPB, 2 g, Intravenous, Q24HRS, Aime Black M.D., Stopped at 04/17/17 2139  •  trazodone (DESYREL) tablet 150 mg, 150 mg, Oral, QHS, Aime Black M.D., 150 mg at 04/17/17 2103  •  pravastatin (PRAVACHOL) tablet 10 mg, 10 mg, Oral, Nightly, Aime Black M.D., 10 mg at 04/17/17 2102  •  minoxidil (LONITEN) tablet 2.5 mg, 2.5 mg, Oral, DAILY, Aime Black M.D., 2.5 mg at 04/18/17 1230  •  labetalol (NORMODYNE) tablet 500 mg, 500 mg, Oral, BID, Aime Black M.D., 500 mg at 04/18/17 1228  •  furosemide (LASIX) tablet 80 mg, 80 mg, Oral, BID, Aime Black M.D., 80 mg at 04/18/17 0606  •  amlodipine (NORVASC) tablet 10 mg, 10 mg, Oral, Q DAY, Aime Black M.D., 10 mg at 04/18/17 1227  •  multivitamin (THERAGRAN) tablet 1 Tab, 1 Tab, Oral, DAILY, Aime Black M.D., 1 Tab at 04/18/17 1230  •  calcium acetate (PHOS-LO) 667 MG capsule 1,334 mg, 1,334 mg, Oral, TID WITH MEALS, Aime Black M.D., 1,334 mg at 04/18/17 1226  •  epoetin hilario (EPOGEN,PROCRIT) injection 2,000 Units, 2,000 Units, Subcutaneous, MO, WE + FR, Aime Black M.D., 2,000 Units at 04/17/17 1142  •  lisinopril (PRINIVIL) tablet 40 mg, 40 mg, Oral, BID, Aime Black M.D., 40 mg at 04/18/17 1227  •  omeprazole (PRILOSEC) capsule 40 mg, 40 mg, Oral, DAILY, Aime Black M.D., 20 mg at 04/17/17 0857  •  tamsulosin (FLOMAX) capsule 0.8 mg, 0.8 mg, Oral, AFTER BREAKFAST, Aime Black M.D., 0.8 mg at 04/17/17 0858  •  isosorbide dinitrate (ISORDIL) tablet 10 mg, 10 mg, Oral, TID, Aime Black M.D., 10 mg at 04/18/17 1226  •  senna-docusate (PERICOLACE or SENOKOT S) 8.6-50 MG per tablet 2 Tab, 2 Tab, Oral, BID, 2 Tab at 04/15/17 2100 **AND**  polyethylene glycol/lytes (MIRALAX) PACKET 1 Packet, 1 Packet, Oral, QDAY PRN **AND** magnesium hydroxide (MILK OF MAGNESIA) suspension 30 mL, 30 mL, Oral, QDAY PRN **AND** bisacodyl (DULCOLAX) suppository 10 mg, 10 mg, Rectal, QDAY PRN, Aime Black M.D.  •  Respiratory Care per Protocol, , Nebulization, Continuous RT, Aime Black M.D.  •  heparin injection 5,000 Units, 5,000 Units, Subcutaneous, Q8HRS, Aime Black M.D., 5,000 Units at 04/15/17 2200  •  ondansetron (ZOFRAN) syringe/vial injection 4 mg, 4 mg, Intravenous, Q4HRS PRN, Aime Black M.D.  •  ondansetron (ZOFRAN ODT) dispertab 4 mg, 4 mg, Oral, Q4HRS PRN, Aime Black M.D.  •  acetaminophen (TYLENOL) tablet 650 mg, 650 mg, Oral, Q6HRS, Aime Black M.D., 650 mg at 04/18/17 0606  •  hydrALAZINE (APRESOLINE) injection 10-20 mg, 10-20 mg, Intravenous, Q6HRS PRN, Aime Black M.D.  •  oxycodone-acetaminophen (PERCOCET) 5-325 MG per tablet 1-2 Tab, 1-2 Tab, Oral, Q6HRS PRN, Aime Black M.D., 1 Tab at 04/17/17 0529  •  morphine (pf) 4 mg/ml injection 1-3 mg, 1-3 mg, Intravenous, Q4HRS PRN, Aime Black M.D., 3 mg at 04/18/17 0609  •  cyclobenzaprine (FLEXERIL) tablet 5 mg, 5 mg, Oral, TID, Aime Black M.D., 5 mg at 04/18/17 1231    Labs:  Recent Labs      04/15/17   1615  04/16/17   0417  04/17/17   0759  04/18/17   0734   WBC  8.4  6.6  6.6  4.5*   RBC  3.36*  2.99*  3.01*  2.90*   HEMOGLOBIN  10.3*  9.3*  9.2*  8.9*   HEMATOCRIT  32.6*  28.6*  28.6*  28.1*   MCV  97.0  95.7  95.0  96.9   MCH  30.7  31.1  30.6  30.7   RDW  53.4*  52.8*  51.3*  52.5*   PLATELETCT  227  208  255  256   MPV  9.8  10.0  10.1  9.4   NEUTSPOLYS  84.10*   --   83.40*   --    LYMPHOCYTES  6.00*   --   6.50*   --    MONOCYTES  8.80   --   6.70   --    EOSINOPHILS  0.40   --   2.40   --    BASOPHILS  0.20   --   0.50   --      Recent Labs      04/16/17   0417  04/17/17   0759  04/18/17   0734   SODIUM  136  135  136   POTASSIUM  5.4  5.0  4.8   CHLORIDE  96  95*  97   CO2   28  28  30   GLUCOSE  82  106*  96   BUN  50*  43*  35*     Recent Labs      04/15/17   1615  04/16/17   0417  04/17/17   0759  04/18/17   0734   ALBUMIN  3.9   --    --    --    TBILIRUBIN  0.9   --    --    --    ALKPHOSPHAT  101*   --    --    --    TOTPROTEIN  7.3   --    --    --    ALTSGPT  15   --    --    --    ASTSGOT  18   --    --    --    CREATININE  7.15*  8.49*  7.42*  6.50*       Imaging:  Ct-head W/o    4/16/2017 4/16/2017 11:13 PM HISTORY/REASON FOR EXAM:  Malignant hypertension, neck pain. TECHNIQUE/EXAM DESCRIPTION AND NUMBER OF VIEWS: CT of the head without contrast. The study was performed on a helical multidetector CT scanner. Contiguous 2.5 mm axial sections were obtained from the skull base through the vertex. Up to date radiation dose reduction adjustments have been utilized to meet ALARA standards for radiation dose reduction. COMPARISON:  None available FINDINGS: Lateral ventricles are normal in size and symmetric. Cortical sulci are moderately enlarged. Patchy areas of low attenuation in the white matter bilaterally. Focal low density in the LEFT basal ganglia consistent with prominent perivascular space. No significant mass effect or midline shift. Basal cisterns are patent. No evidence for intracranial hemorrhage. Calvaria are intact. Orbits show bilateral proptosis. Visualized mastoid air cells are clear. RIGHT maxillary sinus polyp or retention cyst. Calcifications of the carotid arteries noted.     4/16/2017  1.  Diffuse atrophy and white matter changes. 2.  No acute intracranial hemorrhage or territorial infarct.     Dx-chest-2 Views    3/21/2017  3/21/2017 3:00 PM HISTORY/REASON FOR EXAM:  Shortness of Breath Short of breath TECHNIQUE/EXAM DESCRIPTION AND NUMBER OF VIEWS: Two views of the chest. COMPARISON:  8/12/2016. FINDINGS: No pulmonary infiltrates or consolidations are noted. No pleural effusions, no pneumothorax are appreciated. Stable enlarged cardiopericardial  silhouette. Calcified aortic knob.     3/21/2017  1. No pulmonary infiltrates or consolidations are noted. 2. Stable cardiomegaly.    Dx-chest-portable (1 View)    4/15/2017  4/15/2017 4:24 PM HISTORY/REASON FOR EXAM: Chest Pain. Back and neck pain. Coronary artery disease TECHNIQUE/EXAM DESCRIPTION AND NUMBER OF VIEWS: Single AP view of the chest. COMPARISON: 3/21/2017 FINDINGS: Vascular stent is seen just inferior to the left clavicle Lungs: No consolidation detected. Pleura:  No pleural space process is seen. Heart and mediastinum: There is stable marked cardiac silhouette enlargement.     4/15/2017  Stable marked cardiac silhouette enlargement without consolidation identified Left subclavian stent    Mr-cervical Spine-with & W/o    4/18/2017 4/18/2017 8:06 AM HISTORY/REASON FOR EXAM:  Atraumatic Pain/Paresis. TECHNIQUE/EXAM DESCRIPTION: MRI of the cervical spine without and with contrast. The study was performed on a [employer] Signa 1.5 Viviane MRI scanner. T1 sagittal, T2 fast spin-echo sagittal, T2 fat suppressed sagittal and gradient echo axial images were obtained of the cervical spine. Optional T2 axial images may also be obtained. T1 post-contrast fat suppressed sagittal images were obtained of the cervical spine. Optional T1 post-contrast fat-suppressed axial images may be obtained. 8 mL MultiHance contrast was administered intravenously. COMPARISON: C-spine series 7/14/2016 FINDINGS: There is marked irregularity of the endplates at the C6-7 level. There is subtle increased T2 signal intensity in the C6-7 disc space. Additionally there is diffuse decreased T1 and increased T2 signal intensity throughout the C6 and superior aspect of the C7 vertebral bodies. There is also slight enhancement in this region as well. There is moderate disc space narrowing at the C5-6 level. There are bandlike regions of increased T1 and T2 signal intensity about the adjacent endplates. Additionally there is linear increased T2  signal intensity in the prevertebral soft tissues from the C2 level to the C6 level. There is mild disc space narrowing at the C3-4 and C7-T1 levels There is 3 mm of anterolisthesis at this C7-T1 level. There are mild anterior osteophytic changes at the C5-6 and C6-7 levels. There are no anomalies at the craniovertebral junction. The cervical spinal cord is normal in caliber and signal throughout its course. There is no abnormal cord enhancement. There is no abnormal intradural extramedullary enhancement. Level specific findings: C2-3 level normal. C3-4 level mild to moderate broad-based posterior spurring which flattens the ventral surface of the cord and contributes to a mild central canal stenosis. Severe left-sided neural foraminal stenosis and mild to moderate right-sided neural foraminal stenosis. C4-5 level mild posterior spurring. Borderline central canal stenosis. Moderate left-sided neural foraminal stenosis and mild to moderate right-sided neural foraminal stenosis. C5-6 level moderate broad-based posterior spurring which flattens the  ventral surface of the cord and causes a mild to moderate central canal stenosis. Severe right-sided neural foraminal stenosis and mild to moderate left-sided neural foraminal stenosis. C6-7 level mild broad-based posterior spurring which abuts the ventral surface of the cord and causes a mild central canal stenosis. Mild to moderate bilateral neural foraminal stenosis. C7-T1 level minimal posterior spurring. Mild bilateral neural foraminal stenosis.     4/18/2017  1.  Evidence of subacute discitis/osteomyelitis at the C6-7 level. 2.  Mild degenerative anterolisthesis at C7-T1 level. 3.  Prevertebral inflammatory/infectious changes from the C2 level to the C6 level. 4.  Mild to moderate central canal stenosis at the C5-6 level with mild central canal stenosis at the C3-4 and C6-7 levels. 5.  Moderate cervical spondylotic change at the C5-6 level which flattens the ventral  surface of the cord. Mild cervical spondylotic changes at the C3-4, C4-5, and C6-7 levels. 6.  Moderate to severe multilevel neural foraminal stenosis.    Echocardiogram Comp W/o Cont    2017  Transthoracic Echo Report Echocardiography Laboratory CONCLUSIONS Prior echo 16 Normal left ventricular chamber size. Moderate concentric left ventricular hypertrophy. Normal left ventricular systolic function. Left ventricular ejection fraction is visually estimated to be 65%. Normal regional wall motion. Diastolic function is difficult to assess. Enlarged right atrium. Dilated inferior vena cava without inspiratory collapse. Mildly dilated left atrium. Structurally normal aortic valve. Aortic sclerosis with mild stenosis. Vmax is 2.6  m/s. Transvalvular gradients are - Peak: 27 mmHg, Mean: 17 mmHg. Aortic valve area calculated from the continuity equation is 2.1 sq cm. No aortic insufficiency. Moderate tricuspid regurgitation. Estimated right ventricular systolic pressure  is 60 mmHg. Compared to the report of the study done - there has been.  Moderate pericardial effusion without evidence of hemodynamic compromise. Compared to the report of the study done - there has been and increase in the pericardial effusion and slight increase in the estimated RVSP. Known dialysis patient. EDVIN GREEN Exam Date:         2017                    16:23 Exam Location:     Out Patient Priority:          Routine Ordering Physician:        SHELIA WALL Referring Physician:       [id number] Castaneda Sonographer:               Mandy Palm Age:    76     Gender:    M MRN:    [id number] :    1940 BSA:    1.85   Ht (in):    67     Wt (lb):    162 Exam Type:     Complete Indications:     Dyspnea ICD Codes:       786.09 CPT Codes:       37279 BP:          /          HR: Technical Quality:       Fair MEASUREMENTS  (Male / Female) Normal Values 2D ECHO LV Diastolic Diameter PLAX        4.7 cm                4.2 - 5.9 / 3.9  - 5.3 cm LV Systolic Diameter PLAX         3.5 cm                2.1 - 4.0 cm IVS Diastolic Thickness           1.7 cm                LVPW Diastolic Thickness          1.4 cm                RV Diameter 4C                    3 cm                  2.5 - 2.9 cm LVOT Diameter                     2.2 cm                RA Diameter                       4.6 cm                Estimated LV Ejection Fraction    65 %                  LV Ejection Fraction MOD BP       61.7 %                >= 55  % LV Ejection Fraction MOD 4C       60 %                  LV Ejection Fraction MOD 2C       69.4 %                LA Volume Index                   39.2 cm³/m²           16 - 28 cm³/m² IVC Diameter                      2.6 cm                M-MODE Aortic Root Diameter MM           3 cm                  DOPPLER AV Peak Velocity                  2.4 m/s               AV Peak Gradient                  23.8 mmHg             AV Mean Gradient                  14.2 mmHg             LVOT Peak Velocity                1.2 m/s               AV Area Cont Eq vti               2.1 cm²               Mitral E Point Velocity           0.93 m/s              Mitral E to A Ratio               0.99                  Mitral A Duration                 141 ms                MV Pressure Half Time             59.7 ms               MV Area PHT                       3.7 cm²               MV Deceleration Time              206 ms                MR Flow Convergence Radius        0.52 cm               TV Peak E Velocity                0.52 m/s              TR Peak Velocity                  319 cm/s              PV Peak Velocity                  2 m/s                 PV Peak Gradient                  16 mmHg               RVOT Peak Velocity                0.77 m/s              MV E' Velocity                    8.1 cm/s              * Indicates values subject to auto-interpretation LV EF:  65    % FINDINGS Left Ventricle Normal left ventricular chamber size. Moderate  concentric left ventricular hypertrophy. Normal left ventricular systolic function. Left ventricular ejection fraction is visually estimated to be 65%. Normal regional wall motion. Diastolic function is difficult to assess. Right Ventricle The right ventricle was normal in size and function. Right Atrium Enlarged right atrium. Dilated inferior vena cava without inspiratory collapse. Left Atrium Mildly dilated left atrium. Left atrial volume index is 39  mL/sq m. Mitral Valve Structurally normal mitral valve. Mitral annular calcification. No mitral stenosis. Trace mitral regurgitation. Aortic Valve Structurally normal aortic valve. Aortic sclerosis with mild stenosis. Vmax is 2.6  m/s. Transvalvular gradients are - Peak: 27 mmHg, Mean: 17 mmHg. Aortic valve area calculated from the continuity equation is 2.1 sq cm. No aortic insufficiency. Tricuspid Valve Structurally normal tricuspid valve without significant stenosis. Moderate tricuspid regurgitation. Estimated right ventricular systolic pressure  is 60 mmHg. Pulmonic Valve The pulmonic valve is not well visualized. No stenosis or regurgitation seen. Pericardium Normal pericardium with effusion. Largest measurement 3 cm. Moderate pericardial effusion without evidence of hemodynamic compromise. Aorta The aortic root is normal. James Mendoza M.D. (Electronically Signed) Final Date:     11 April 2017                 17:23    Ct-cta Complete Thoracoabdominal Aorta    4/15/2017  4/15/2017 5:31 PM HISTORY/REASON FOR EXAM:  Chest Pain TECHNIQUE/EXAM DESCRIPTION:  CT angiogram of the chest and abdomen with and without reconstructions. Initial precontrast images were obtained from the lung apices through the diaphragmatic domes. Following this, 100 mL of Omnipaque 350 nonionic contrast was administered at 5 mL/sec and helical scanning was obtained from the lung apices thru the iliac crest and bifurcation. Thick and thin section multiplanar volume reformats were  generated from the axial data set in the sagittal and coronal planes. 3D angiographic images were reviewed on PACS. Maximum intensity projection (MIP) images were generated and reviewed. Low dose optimization technique was utilized for this CT exam including automated exposure control and adjustment of the mA and/or kV according to patient size. COMPARISON:  August 12, 2016 FINDINGS: Noncontrast images: There is no intramural hematoma. There is calcified plaque in the aorta. Coronary artery calcifications are identified. There is a moderate sized pericardial effusion. Contrast images: Aorta and vasculature: No evidence of thoracic aortic aneurysm or dissection. Images of arch are somewhat degraded by motion artifact. There is coarse calcified plaque in the abdominal aorta without aneurysm. There is no mediastinal or hilar adenopathy. There is trace pleural effusions. There is dependent atelectasis. There is mild diffuse groundglass consolidation suggesting mild edema. There is again hyperenhancement in the left hepatic lobe is nonspecific. Hepatic cysts are once again noted in the right hepatic lobe. Spleen is normal in size. Pancreas is unremarkable. There is again gallbladder wall thickening likely related to congestive heart failure. There is again diffuse enlargement of the adrenal gland suggesting hyperplasia. Kidneys enhance symmetrically. There is bilateral renal cortical thinning with multiple bilateral renal cysts. Hyperdense cyst once again noted arising from the lower pole of the right kidney. The bowel is nondilated. There is trace ascites.     4/15/2017  1.  Aortic atherosclerosis without evidence of dissection or aneurysm. 2.  Moderate pericardial effusion with pulmonary edema and trace pleural effusions. 3.  Persistent ill-defined hyperenhancement within the medial segment of the left hepatic lobe could related to shunting though hypervascular lesion is difficult to exclude. Recommend follow-up liver  "CT or MRI. 4.  Bilateral renal cortical thinning with multiple cysts.       Micro:  Results     Procedure Component Value Units Date/Time    BLOOD CULTURE [[id number]]  (Abnormal) Collected:  04/15/17 2050    Order Status:  Completed Specimen Information:  Blood from Peripheral Updated:  04/18/17 1301     Significant Indicator POS (POS)      Source BLD      Site PERIPHERAL      Blood Culture -- (A)      Result:        Blood culture testing and Gram stain, if indicated, are  performed at [facility], [address] [place] [address].  Positive blood cultures are  sent to [facility], [address] [place] [address], for organism identification and  susceptibility testing.  Growth detected by Bactec instrument.       Blood Culture -- (A)      Result:        Viridans Streptococcus  See previous culture for sensitivity report.      Narrative:      CALL  [facility]  MED tel. [phone],  CALLED  MED tel. [phone] 04/16/2017, 11:28, RB PERF. RESULTS CALLED  TO:Fredi,rn  Per Hospital Policy: Only change Specimen Src: to \"Line\" if  specified by physician order.    BLOOD CULTURE [[id number]]  (Abnormal)  (Susceptibility) Collected:  04/15/17 2055    Order Status:  Completed Specimen Information:  Blood from Peripheral Updated:  04/18/17 1301     Significant Indicator POS (POS)      Source BLD      Site PERIPHERAL      Blood Culture -- (A)      Result:        Blood culture testing and Gram stain, if indicated, are  performed at [facility], [address] [place] [address].  Positive blood cultures are  sent to [facility], [address] [place] [address], for organism identification and  susceptibility testing.  Growth detected by Bactec instrument.       Blood Culture Viridans Streptococcus (A)     Narrative:      CALL  [facility]  MED tel. [phone],  CALLED  MED tel. [phone] 04/16/2017, 10:13, RB PERF. RESULTS CALLED  TO:[id number],rn  Per " "Hospital Policy: Only change Specimen Src: to \"Line\" if  specified by physician order.    Culture & Susceptibility     VIRIDANS STREPTOCOCCUS     Antibiotic Sensitivity Microscan Unit Status    Cefotaxime Sensitive 0.064 mcg/mL Final    Penicillin Sensitive 0.125 mcg/mL Final                       BLOOD CULTURE [[id number]] Collected:  04/17/17 0754    Order Status:  Completed Specimen Information:  Blood from Peripheral Updated:  04/18/17 0936     Significant Indicator NEG      Source BLD      Site PERIPHERAL      Blood Culture --      Result:        No Growth    Note: Blood cultures are incubated for 5 days and  are monitored continuously.Positive blood cultures  are called to the RN and reported as soon as  they are identified.      Narrative:      Per Hospital Policy: Only change Specimen Src: to \"Line\" if  specified by physician order.    BLOOD CULTURE [[id number]] Collected:  04/17/17 0759    Order Status:  Completed Specimen Information:  Blood from Peripheral Updated:  04/18/17 0936     Significant Indicator NEG      Source BLD      Site PERIPHERAL      Blood Culture --      Result:        No Growth    Note: Blood cultures are incubated for 5 days and  are monitored continuously.Positive blood cultures  are called to the RN and reported as soon as  they are identified.      Narrative:      Per Hospital Policy: Only change Specimen Src: to \"Line\" if  specified by physician order.           Assessment:  Active Hospital Problems    Diagnosis   • Dyslipidemia [E78.5]   • Bacteremia due to Gram-positive bacteria [A49.9]   • Acute neck pain [M54.2]   • HTN (hypertension), malignant [I10]   • Anemia in CKD (chronic kidney disease) [N18.9, D63.1]   • End stage renal disease (HCC) [N18.6]   • BPH (benign prostatic hyperplasia) [N40.0]       Plan:  Strep viridans bacteremia  D/c vanco  Continue rocephin  Blood c/s are neg from 4/17  C6-7 om/ diskitis  Continue rocephin  Aim for 6 weeks at least    Prevertebral " phlegmon  Continue to monitor    Pericardial effusion  R/o infection    esrd on Hd        Discussed with internal Medicine

## 2017-04-18 NOTE — PROGRESS NOTES
Patient tolerated his 3 hr HD and net UF 2000 ml. AVF has good B/T. No problem was noted. Hypertensive during HD Dr Larson and primary RN was notified. Medicated. No bleeding from needle sites after 5 minutes post removal of needle cannula. Report given to primary RN. Patient is alert and oriented. No complaint.

## 2017-04-18 NOTE — PROGRESS NOTES
Hospital Medicine Progress Note, Adult, Complex               Author: Mariaelena Millan Date & Time created: 4/18/2017  3:23 PM     Interval History:  76YR OLD M WITH BACTERIMIA AND DISKITIS     Review of Systems:  Review of Systems   Constitutional: Negative for fever, chills and weight loss.   HENT: Negative for hearing loss and tinnitus.    Eyes: Negative for blurred vision, double vision and photophobia.   Respiratory: Negative for cough, hemoptysis and sputum production.    Cardiovascular: Negative for chest pain, palpitations and orthopnea.   Gastrointestinal: Negative for heartburn, nausea and vomiting.   Genitourinary: Negative for dysuria, urgency and frequency.   Musculoskeletal: Positive for neck pain.   Skin: Negative for itching and rash.   Neurological: Negative for dizziness, tingling, tremors and headaches.       Physical Exam:  Physical Exam   Constitutional: He is oriented to person, place, and time. No distress.   HENT:   Head: Normocephalic and atraumatic.   Eyes: Right eye exhibits no discharge. Left eye exhibits no discharge.   Neck: Neck supple. No JVD present.   Cardiovascular: Normal rate and regular rhythm.    Pulmonary/Chest: No stridor. No respiratory distress. He has no wheezes. He has no rales.   Abdominal: Soft. He exhibits no distension. There is no tenderness. There is no rebound.   Musculoskeletal: He exhibits no edema or tenderness.   Neurological: He is alert and oriented to person, place, and time.   Skin: Skin is warm and dry. He is not diaphoretic.       Labs:        Invalid input(s): [id number]  Recent Labs      04/15/17   1615   TROPONINI  0.02   BNPBTYPENAT  1583*     Recent Labs      04/16/17   0417  04/17/17   0759  04/18/17   0734   SODIUM  136  135  136   POTASSIUM  5.4  5.0  4.8   CHLORIDE  96  95*  97   CO2  28  28  30   BUN  50*  43*  35*   CREATININE  8.49*  7.42*  6.50*   CALCIUM  9.0  9.1  9.3     Recent Labs      04/15/17   1615  04/16/17   0417  04/17/17    07517   0734   ALTSGPT  15   --    --    --    ASTSGOT  18   --    --    --    ALKPHOSPHAT  101*   --    --    --    TBILIRUBIN  0.9   --    --    --    LIPASE  29   --    --    --    GLUCOSE  106*  82  106*  96     Recent Labs      04/15/17   1615  04/16/17   0417  04/17/17   0754  04/17/17   0759  04/18/17   0734   RBC  3.36*  2.99*   --   3.01*  2.90*   HEMOGLOBIN  10.3*  9.3*   --   9.2*  8.9*   HEMATOCRIT  32.6*  28.6*   --   28.6*  28.1*   PLATELETCT  227  208   --   255  256   PROTHROMBTM  15.6*   --   14.0   --    --    APTT  37.6*   --    --    --    --    INR  1.26*   --   1.10   --    --      Recent Labs      04/15/17   1615  04/16/17   0417  04/17/17   0759  04/18/17   0734   WBC  8.4  6.6  6.6  4.5*   NEUTSPOLYS  84.10*   --   83.40*   --    LYMPHOCYTES  6.00*   --   6.50*   --    MONOCYTES  8.80   --   6.70   --    EOSINOPHILS  0.40   --   2.40   --    BASOPHILS  0.20   --   0.50   --    ASTSGOT  18   --    --    --    ALTSGPT  15   --    --    --    ALKPHOSPHAT  101*   --    --    --    TBILIRUBIN  0.9   --    --    --            Hemodynamics:  Temp (24hrs), Av.8 °C (98.3 °F), Min:36.7 °C (98 °F), Max:37.1 °C (98.7 °F)  Temperature: 36.7 °C (98 °F)  Pulse  Av.8  Min: 58  Max: 87   Blood Pressure : (!) 167/81 mmHg     Respiratory:    Respiration: 18, Pulse Oximetry: 91 %        RUL Breath Sounds: Clear, RML Breath Sounds: Diminished, RLL Breath Sounds: Diminished, SILVANA Breath Sounds: Clear, LLL Breath Sounds: Clear  Fluids:    Intake/Output Summary (Last 24 hours) at 17 1523  Last data filed at 17 1356   Gross per 24 hour   Intake    460 ml   Output      0 ml   Net    460 ml        GI/Nutrition:  Orders Placed This Encounter   Procedures   • Diet Order     Standing Status: Standing      Number of Occurrences: 1      Standing Expiration Date:      Order Specific Question:  Diet:     Answer:  Renal [8]     Medical Decision Making, by Problem:  Active Hospital Problems     Diagnosis   • Dyslipidemia [E78.5]   • Bacteremia due to Gram-positive bacteria [A49.9]   • Acute neck pain [M54.2]   • HTN (hypertension), malignant [I10]   • Anemia in CKD (chronic kidney disease) [N18.9, D63.1]   • End stage renal disease (HCC) [N18.6]   • BPH (benign prostatic hyperplasia) [N40.0]   76YR OLD M WITH BACTERIMIA AND NECK OSTEOMYELITIS  ON VANCOMYCIN AS PER PHARMACY  ON ROCEPHIN  MRI RESULT IS NOTED  I.D INPUT IS NOTED    PERICARDIAL EFFUSION  ECHO RESULT IS NOTED  CONSULTED CARDIOLOGY    ESRD  NEPHROLOGY INPUT IS NOTED  ON DIALYSIS    HTN  CONTINUE WITH HOME MEDS    PAIN  MORPHINE PRN      Poon catheter: No Poon      DVT Prophylaxis: Heparin

## 2017-04-18 NOTE — CARE PLAN
Problem: Communication  Goal: The ability to communicate needs accurately and effectively will improve  Intervention: [place] patient and significant other/support system to call light to alert staff of needs    04/18/17 5036   OTHER   Oriented to: All of the Following : Location of Bathroom, Visiting Policy, Unit Routine, Call Light and Bedside Controls, Bedside Rail Policy, Smoking Policy, Rights and Responsibilities, Bedside Report, and Patient Education Notebook

## 2017-04-18 NOTE — CARE PLAN
Problem: Safety  Goal: Will remain free from injury  Intervention: Provide assistance with mobility    04/18/17 1346   OTHER   Assistance / Tolerance No assistance required;Tolerates Well

## 2017-04-18 NOTE — PROGRESS NOTES
Family at bedside   Pt states his pain med is not working and requesting to speak with MD for stronger pain med   Wife would like to have a pulmonary MD in to see pt  Called placed to hosp

## 2017-04-18 NOTE — CARE PLAN
Problem: Venous Thromboembolism (VTW)/Deep Vein Thrombosis (DVT) Prevention:  Goal: Patient will participate in Venous Thrombosis (VTE)/Deep Vein Thrombosis (DVT)Prevention Measures  Heparin SC ordered for prophylactic VTE. Pt educated on the medication and the need for prophylaxis. All questions and concerns addressed. Pt verbalized understanding but is refusing to participate in VTE prophylaxis.    Problem: Knowledge Deficit  Goal: Knowledge of disease process/condition, treatment plan, diagnostic tests, and medications will improve  Diagnosis and POC discussed. Noc routine, MD orders, and medications reviewed. All questions and concerns addressed. Pt verbalized understanding.

## 2017-04-18 NOTE — PROGRESS NOTES
2100: Pt assessed and medicated per MD order. States pain is at a 7/10 in his neck and upper back between shoulder blades that he describes as a constant ache. Pt A&O x4. Noc routine, MD orders, and medications reviewed. All questions and concerns addressed. MRI questions asked. Form filled out and faxed to MRI.   2230: Pt medicated with remaining medications. Refusing heparin sc at this time. Pt educated on VTE prophylaxis and continues to refuse.   0000: Son Marquis stopped in to check on pt. Pt sleeping at this time.   0230: Pt sleeping with no s/s of distress.   0610: Pt medicated per MD order as well as for pain. Pt has no further needs at this time.

## 2017-04-18 NOTE — CONSULTS
DATE OF SERVICE:  04/17/2017    REFERRING PHYSICIAN:  Yandel Sutton DO.    REASON FOR CONSULTATION:  Bacteremia.    HISTORY OF PRESENT ILLNESS:  Patient is a 76-year-old white male who has ____   end-stage renal disease, on hemodialysis, COPD, hypertension, BPH, congestive   heart failure, MSSA bacteremia in May of 2016.  He started having neck pain   and shoulder blade pain for 1 week.  He also had temps up to 100.9.  He was   seen in his primary care office and he was sent to [facility] for   evaluation.  Since he has been here, his blood cultures have come back   positive for Strep species.  In view of that, infectious disease consult has   been called.    REVIEW OF SYSTEMS:  Patient complains of neck pain and pain between his   shoulder blades.  He denies any nausea, vomiting, or diarrhea.  He denies any   photophobia.  He denies any headache.  He complains of generalized malaise.    He denies any significant fevers.  He denies any vision change.  Other review   of systems is negative per AMA and CMS criteria.    ALLERGIES:  No known drug allergies.    PAST MEDICAL HISTORY:  History of end-stage renal disease, on hemodialysis,   COPD, pericardial effusion, hypertension, BPH, basal cell CA, congestive heart   failure, peripheral vascular disease, and sleep apnea.    PAST SURGICAL HISTORY:  Left subclavian stent placement, cataract surgery, and   AV fistula thrombectomy.    SOCIAL HISTORY:  Ex-smoker and a drinker.  He denies any drug use.    FAMILY HISTORY:  Reviewed and noncontributory.    MEDICATIONS:  Currently, he is on Tylenol, Proventil, Norvasc, Rocephin,   Plavix, hydralazine, Isordil, Xylocaine, vancomycin, Theragran, Zofran,   Pravachol, Flomax, and Desyrel.    LABORATORY STUDIES:  His WBC count is 6.6, platelets of 255, and 83%   neutrophils.  Creatinine is 7.4.  Blood cultures are Strep species.    RADIOGRAPHIC STUDIES:  Chest x-ray shows without consolidation.    PHYSICAL  EXAMINATION:  GENERAL:  Patient looks ill.  VITAL SIGNS:  T-max is 98.6, blood pressure is 210/72, and pulse is 70.  HEAD AND ENT:  Mucosa is moist.  NECK:  Supple, has tenderness in the cervical spine and the paraspinous area.  PULMONARY:  Bilateral air entry, clear to auscultation.  CARDIOVASCULAR SYSTEM:  Regular plus murmur heard.  ABDOMEN:  Soft and nontender.  EXTREMITIES:  No edema.  He has left-sided fistula.  CENTRAL NERVOUS SYSTEM:  Alert and oriented x3.  No focal neurological   deficit.    ASSESSMENT:  1.  Streptococcus bacteremia.  2.  Possibility of a cervical epidural abscess.  3.  Pericardial effusion, rule out infection of the effusion  4.  End-stage renal disease, on hemodialysis.  5.  Medical problems as above.    RECOMMENDATIONS:  At this time, I would recommend to continue with the   vancomycin and Rocephin.  Check MRI of his C-spine.  He does not require a   LP____.  I would recommend getting cardiology consultation for the pericardial   effusion.  Continue with the supportive measures.  Prognosis is guarded.  I   have reviewed all the records.  The plan was discussed with internal medicine.    Thank you for the consultation.       ____________________________________     EUSEBIO MOON MD JD / DELANO    DD:  04/17/2017 15:34:40  DT:  04/17/2017 19:25:19    D#:  [id number]  Job#:  [id number]

## 2017-04-19 LAB
ANION GAP SERPL CALC-SCNC: 11 MMOL/L (ref 0–11.9)
BASOPHILS # BLD AUTO: 0.6 % (ref 0–1.8)
BASOPHILS # BLD: 0.03 K/UL (ref 0–0.12)
BUN SERPL-MCNC: 36 MG/DL (ref 8–22)
CALCIUM SERPL-MCNC: 9.9 MG/DL (ref 8.4–10.2)
CHLORIDE SERPL-SCNC: 99 MMOL/L (ref 96–112)
CO2 SERPL-SCNC: 30 MMOL/L (ref 20–33)
CREAT SERPL-MCNC: 5.48 MG/DL (ref 0.5–1.4)
EOSINOPHIL # BLD AUTO: 0.3 K/UL (ref 0–0.51)
EOSINOPHIL NFR BLD: 6 % (ref 0–6.9)
ERYTHROCYTE [DISTWIDTH] IN BLOOD BY AUTOMATED COUNT: 51.5 FL (ref 35.9–50)
GFR SERPL CREATININE-BSD FRML MDRD: 10 ML/MIN/1.73 M 2
GLUCOSE SERPL-MCNC: 94 MG/DL (ref 65–99)
HCT VFR BLD AUTO: 30 % (ref 42–52)
HGB BLD-MCNC: 9.5 G/DL (ref 14–18)
IMM GRANULOCYTES # BLD AUTO: 0.01 K/UL (ref 0–0.11)
IMM GRANULOCYTES NFR BLD AUTO: 0.2 % (ref 0–0.9)
LYMPHOCYTES # BLD AUTO: 0.7 K/UL (ref 1–4.8)
LYMPHOCYTES NFR BLD: 14 % (ref 22–41)
MCH RBC QN AUTO: 30.2 PG (ref 27–33)
MCHC RBC AUTO-ENTMCNC: 31.7 G/DL (ref 33.7–35.3)
MCV RBC AUTO: 95.2 FL (ref 81.4–97.8)
MONOCYTES # BLD AUTO: 0.44 K/UL (ref 0–0.85)
MONOCYTES NFR BLD AUTO: 8.8 % (ref 0–13.4)
NEUTROPHILS # BLD AUTO: 3.52 K/UL (ref 1.82–7.42)
NEUTROPHILS NFR BLD: 70.4 % (ref 44–72)
NRBC # BLD AUTO: 0 K/UL
NRBC BLD AUTO-RTO: 0 /100 WBC
PLATELET # BLD AUTO: 296 K/UL (ref 164–446)
PMV BLD AUTO: 9.6 FL (ref 9–12.9)
POTASSIUM SERPL-SCNC: 5.4 MMOL/L (ref 3.6–5.5)
RBC # BLD AUTO: 3.15 M/UL (ref 4.7–6.1)
SODIUM SERPL-SCNC: 140 MMOL/L (ref 135–145)
WBC # BLD AUTO: 5 K/UL (ref 4.8–10.8)

## 2017-04-19 PROCEDURE — A9270 NON-COVERED ITEM OR SERVICE: HCPCS | Performed by: HOSPITALIST

## 2017-04-19 PROCEDURE — 90935 HEMODIALYSIS ONE EVALUATION: CPT

## 2017-04-19 PROCEDURE — 700111 HCHG RX REV CODE 636 W/ 250 OVERRIDE (IP): Performed by: FAMILY MEDICINE

## 2017-04-19 PROCEDURE — 700111 HCHG RX REV CODE 636 W/ 250 OVERRIDE (IP): Performed by: HOSPITALIST

## 2017-04-19 PROCEDURE — 700101 HCHG RX REV CODE 250: Performed by: INTERNAL MEDICINE

## 2017-04-19 PROCEDURE — 85025 COMPLETE CBC W/AUTO DIFF WBC: CPT

## 2017-04-19 PROCEDURE — 700102 HCHG RX REV CODE 250 W/ 637 OVERRIDE(OP): Performed by: FAMILY MEDICINE

## 2017-04-19 PROCEDURE — 700101 HCHG RX REV CODE 250: Performed by: FAMILY MEDICINE

## 2017-04-19 PROCEDURE — 700102 HCHG RX REV CODE 250 W/ 637 OVERRIDE(OP): Performed by: HOSPITALIST

## 2017-04-19 PROCEDURE — 94760 N-INVAS EAR/PLS OXIMETRY 1: CPT

## 2017-04-19 PROCEDURE — 94640 AIRWAY INHALATION TREATMENT: CPT

## 2017-04-19 PROCEDURE — 770006 HCHG ROOM/CARE - MED/SURG/GYN SEMI*

## 2017-04-19 PROCEDURE — 80048 BASIC METABOLIC PNL TOTAL CA: CPT

## 2017-04-19 PROCEDURE — 700101 HCHG RX REV CODE 250: Performed by: HOSPITALIST

## 2017-04-19 PROCEDURE — A9270 NON-COVERED ITEM OR SERVICE: HCPCS | Performed by: FAMILY MEDICINE

## 2017-04-19 PROCEDURE — 700105 HCHG RX REV CODE 258: Performed by: HOSPITALIST

## 2017-04-19 PROCEDURE — 700111 HCHG RX REV CODE 636 W/ 250 OVERRIDE (IP): Performed by: INTERNAL MEDICINE

## 2017-04-19 PROCEDURE — 99232 SBSQ HOSP IP/OBS MODERATE 35: CPT | Performed by: FAMILY MEDICINE

## 2017-04-19 RX ORDER — MORPHINE SULFATE 30 MG/1
30 TABLET, FILM COATED, EXTENDED RELEASE ORAL EVERY 12 HOURS
Status: DISCONTINUED | OUTPATIENT
Start: 2017-04-19 | End: 2017-04-20 | Stop reason: HOSPADM

## 2017-04-19 RX ORDER — LIDOCAINE HYDROCHLORIDE 10 MG/ML
1 INJECTION, SOLUTION INFILTRATION; PERINEURAL
Status: DISCONTINUED | OUTPATIENT
Start: 2017-04-19 | End: 2017-04-20 | Stop reason: HOSPADM

## 2017-04-19 RX ORDER — LIDOCAINE 50 MG/G
1 PATCH TOPICAL EVERY 24 HOURS
Status: DISCONTINUED | OUTPATIENT
Start: 2017-04-19 | End: 2017-04-20 | Stop reason: HOSPADM

## 2017-04-19 RX ADMIN — LABETALOL HYDROCHLORIDE 500 MG: 100 TABLET, FILM COATED ORAL at 21:08

## 2017-04-19 RX ADMIN — TRAZODONE HYDROCHLORIDE 150 MG: 50 TABLET ORAL at 21:09

## 2017-04-19 RX ADMIN — CLOPIDOGREL BISULFATE 75 MG: 75 TABLET, FILM COATED ORAL at 09:10

## 2017-04-19 RX ADMIN — LISINOPRIL 40 MG: 20 TABLET ORAL at 09:14

## 2017-04-19 RX ADMIN — HYDRALAZINE HYDROCHLORIDE 25 MG: 25 TABLET, FILM COATED ORAL at 18:13

## 2017-04-19 RX ADMIN — ALBUTEROL SULFATE 2.5 MG: 2.5 SOLUTION RESPIRATORY (INHALATION) at 09:44

## 2017-04-19 RX ADMIN — HYDRALAZINE HYDROCHLORIDE 25 MG: 25 TABLET, FILM COATED ORAL at 06:21

## 2017-04-19 RX ADMIN — ACETAMINOPHEN 650 MG: 325 TABLET, FILM COATED ORAL at 06:24

## 2017-04-19 RX ADMIN — HYDRALAZINE HYDROCHLORIDE 10 MG: 20 INJECTION INTRAMUSCULAR; INTRAVENOUS at 06:27

## 2017-04-19 RX ADMIN — LIDOCAINE 1 PATCH: 50 PATCH CUTANEOUS at 18:13

## 2017-04-19 RX ADMIN — HYDRALAZINE HYDROCHLORIDE 25 MG: 25 TABLET, FILM COATED ORAL at 21:10

## 2017-04-19 RX ADMIN — HYDRALAZINE HYDROCHLORIDE 10 MG: 20 INJECTION INTRAMUSCULAR; INTRAVENOUS at 04:05

## 2017-04-19 RX ADMIN — HYDROMORPHONE HYDROCHLORIDE 0.5 MG: 1 INJECTION, SOLUTION INTRAMUSCULAR; INTRAVENOUS; SUBCUTANEOUS at 18:25

## 2017-04-19 RX ADMIN — EPOETIN ALFA 2000 UNITS: 2000 SOLUTION INTRAVENOUS; SUBCUTANEOUS at 14:24

## 2017-04-19 RX ADMIN — ISOSORBIDE DINITRATE 10 MG: 10 TABLET ORAL at 21:10

## 2017-04-19 RX ADMIN — TAMSULOSIN HYDROCHLORIDE 0.8 MG: 0.4 CAPSULE ORAL at 09:08

## 2017-04-19 RX ADMIN — PRAVASTATIN SODIUM 10 MG: 20 TABLET ORAL at 21:10

## 2017-04-19 RX ADMIN — AMLODIPINE BESYLATE 10 MG: 5 TABLET ORAL at 09:08

## 2017-04-19 RX ADMIN — CALCIUM ACETATE 1334 MG: 667 CAPSULE ORAL at 13:57

## 2017-04-19 RX ADMIN — MORPHINE SULFATE 30 MG: 30 TABLET, EXTENDED RELEASE ORAL at 13:57

## 2017-04-19 RX ADMIN — HEPARIN SODIUM 1500 UNITS: 1000 INJECTION, SOLUTION INTRAVENOUS; SUBCUTANEOUS at 14:00

## 2017-04-19 RX ADMIN — LISINOPRIL 40 MG: 20 TABLET ORAL at 21:07

## 2017-04-19 RX ADMIN — MULTIVITAMIN TABLET 1 TABLET: TABLET at 09:09

## 2017-04-19 RX ADMIN — CEFTRIAXONE 2 G: 2 INJECTION, POWDER, FOR SOLUTION INTRAMUSCULAR; INTRAVENOUS at 21:11

## 2017-04-19 RX ADMIN — CALCIUM ACETATE 1334 MG: 667 CAPSULE ORAL at 18:12

## 2017-04-19 RX ADMIN — LIDOCAINE HYDROCHLORIDE 1 ML: 10 INJECTION, SOLUTION INFILTRATION; PERINEURAL at 13:59

## 2017-04-19 RX ADMIN — ISOSORBIDE DINITRATE 10 MG: 10 TABLET ORAL at 09:10

## 2017-04-19 RX ADMIN — OXYCODONE HYDROCHLORIDE AND ACETAMINOPHEN 2 TABLET: 5; 325 TABLET ORAL at 09:04

## 2017-04-19 RX ADMIN — MORPHINE SULFATE 30 MG: 30 TABLET, EXTENDED RELEASE ORAL at 21:09

## 2017-04-19 RX ADMIN — ISOSORBIDE DINITRATE 10 MG: 10 TABLET ORAL at 18:13

## 2017-04-19 RX ADMIN — MINOXIDIL 2.5 MG: 2.5 TABLET ORAL at 09:07

## 2017-04-19 RX ADMIN — LABETALOL HYDROCHLORIDE 500 MG: 100 TABLET, FILM COATED ORAL at 09:05

## 2017-04-19 RX ADMIN — HYDROMORPHONE HYDROCHLORIDE 0.5 MG: 1 INJECTION, SOLUTION INTRAMUSCULAR; INTRAVENOUS; SUBCUTANEOUS at 03:56

## 2017-04-19 RX ADMIN — FUROSEMIDE 80 MG: 40 TABLET ORAL at 06:24

## 2017-04-19 RX ADMIN — FUROSEMIDE 80 MG: 40 TABLET ORAL at 18:23

## 2017-04-19 ASSESSMENT — ENCOUNTER SYMPTOMS
BACK PAIN: 1
BLURRED VISION: 0
WEAKNESS: 1
HEARTBURN: 0
TREMORS: 0
ORTHOPNEA: 0
WEIGHT LOSS: 0
SHORTNESS OF BREATH: 0
SPUTUM PRODUCTION: 0
HEMOPTYSIS: 0
PHOTOPHOBIA: 0
NECK PAIN: 1
FEVER: 0
COUGH: 0
DOUBLE VISION: 0
DIZZINESS: 0
CHILLS: 0
HEADACHES: 0
PALPITATIONS: 0
TINGLING: 0
NAUSEA: 0
DIARRHEA: 0
VOMITING: 0

## 2017-04-19 ASSESSMENT — PAIN SCALES - GENERAL
PAINLEVEL_OUTOF10: 7

## 2017-04-19 NOTE — CARE PLAN
Problem: Infection  Goal: Will remain free from infection  Standard precautions in place with every patient interaction. Frequent handwashing and foaming utilized to prevent spread of infection to patient or staff members.      Problem: Pain Management  Goal: Pain level will decrease to patient’s comfort goal  0-10 pain scale used to assess and monitor pt pain. Pt medicated per MD order. Non pharm methods such as heat also utilized.

## 2017-04-19 NOTE — PROGRESS NOTES
Hospital Medicine Progress Note, Adult, Complex               Author: Mariaelena Millan Date & Time created: 4/19/2017  11:59 AM     Interval History:  76YR OLD M WITH BACTERIMIA AND DISKITIS     Review of Systems:  Review of Systems   Constitutional: Negative for fever, chills and weight loss.   HENT: Negative for hearing loss and tinnitus.    Eyes: Negative for blurred vision, double vision and photophobia.   Respiratory: Negative for cough, hemoptysis and sputum production.    Cardiovascular: Negative for chest pain, palpitations and orthopnea.   Gastrointestinal: Negative for heartburn, nausea and vomiting.   Genitourinary: Negative for dysuria, urgency and frequency.   Musculoskeletal: Positive for back pain (UPPER BACK PAIN) and neck pain.   Skin: Negative for itching and rash.   Neurological: Negative for dizziness, tingling, tremors and headaches.       Physical Exam:  Physical Exam   Constitutional: He is oriented to person, place, and time. No distress.   HENT:   Head: Normocephalic and atraumatic.   Eyes: Right eye exhibits no discharge. Left eye exhibits no discharge.   Neck: No JVD present.   Cardiovascular: Normal rate and regular rhythm.    Pulmonary/Chest: No stridor. No respiratory distress. He has no wheezes. He has no rales.   Abdominal: Soft. He exhibits no distension. There is no tenderness. There is no rebound.   Musculoskeletal: He exhibits no edema or tenderness.   Neurological: He is alert and oriented to person, place, and time.   Skin: Skin is warm and dry. He is not diaphoretic.       Labs:        Invalid input(s): [id number]      Recent Labs      04/17/17   0759 04/18/17   0734  04/19/17   0447   SODIUM  135  136  140   POTASSIUM  5.0  4.8  5.4   CHLORIDE  95*  97  99   CO2  28  30  30   BUN  43*  35*  36*   CREATININE  7.42*  6.50*  5.48*   CALCIUM  9.1  9.3  9.9     Recent Labs      04/17/17   0759  04/18/17   0734  04/19/17   0447   GLUCOSE  106*  96  94     Recent Labs       17   0754  17   07517   0734  17   0447   RBC   --   3.01*  2.90*  3.15*   HEMOGLOBIN   --   9.2*  8.9*  9.5*   HEMATOCRIT   --   28.6*  28.1*  30.0*   PLATELETCT   --   255  256  296   PROTHROMBTM  14.0   --    --    --    INR  1.10   --    --    --      Recent Labs      17   07517   0734  177   WBC  6.6  4.5*  5.0   NEUTSPOLYS  83.40*   --   70.40   LYMPHOCYTES  6.50*   --   14.00*   MONOCYTES  6.70   --   8.80   EOSINOPHILS  2.40   --   6.00   BASOPHILS  0.50   --   0.60           Hemodynamics:  Temp (24hrs), Av.7 °C (98 °F), Min:36.4 °C (97.5 °F), Max:36.8 °C (98.2 °F)  Temperature: 36.4 °C (97.5 °F)  Pulse  Av  Min: 58  Max: 87   Blood Pressure : (!) 190/50 mmHg     Respiratory:    Respiration: 20, Pulse Oximetry: 97 %, O2 Daily Delivery Respiratory : Room Air with O2 Available     Given By:: Mouthpiece, Work Of Breathing / Effort: Mild  RUL Breath Sounds: Clear, RML Breath Sounds: Diminished, RLL Breath Sounds: Diminished, SILVANA Breath Sounds: Diminished, LLL Breath Sounds: Diminished  Fluids:    Intake/Output Summary (Last 24 hours) at 17 1159  Last data filed at 17   Gross per 24 hour   Intake    740 ml   Output   2400 ml   Net  -1660 ml        GI/Nutrition:  Orders Placed This Encounter   Procedures   • Diet Order     Standing Status: Standing      Number of Occurrences: 1      Standing Expiration Date:      Order Specific Question:  Diet:     Answer:  Renal [8]     Medical Decision Making, by Problem:  Active Hospital Problems    Diagnosis   • Dyslipidemia [E78.5]   • Bacteremia due to Gram-positive bacteria [A49.9]   • Acute neck pain [M54.2]   • HTN (hypertension), malignant [I10]   • Anemia in CKD (chronic kidney disease) [N18.9, D63.1]   • End stage renal disease (HCC) [N18.6]   • BPH (benign prostatic hyperplasia) [N40.0]   76YR OLD M WITH BACTERIMIA AND NECK OSTEOMYELITIS  ON VANCOMYCIN AS PER PHARMACY DOSING  SPOKE TO  I.D  6 TO 8 WEEKS OF  ROCEPHIN  MRI RESULT IS NOTED      PERICARDIAL EFFUSION  ECHO RESULT IS NOTED  SPOKE TO THE CARDIOLOGIST AND NO INTERVENTION IS NEEDED    ESRD  NEPHROLOGY INPUT IS NOTED  ON DIALYSIS    HTN  CONTINUE WITH HOME MEDS    PAIN  NOT WELL CONTROLLED  WILL START M.S CONTIN  IV DILAUDID        Poon catheter: No Poon

## 2017-04-19 NOTE — PROGRESS NOTES
Hospital Medicine Progress Note, Adult, Complex               Author: Long Larson Date & Time created: 2017  1:47 PM     Interval History:  76 year old with ESRD, came in with back/neck pain and found to have bacteremia. MRI with osteomyelitis/diskitis as well as prevertebral infectious changes. On Abx.    Review of Systems:  Review of Systems   Constitutional: Positive for malaise/fatigue.   Musculoskeletal: Positive for back pain and neck pain.   Neurological: Positive for weakness.       Physical Exam:  Physical Exam   Constitutional: He is oriented to person, place, and time. He appears well-developed and well-nourished.   Cardiovascular: Normal rate and regular rhythm.    Musculoskeletal: He exhibits no edema or tenderness.   Neurological: He is alert and oriented to person, place, and time.   Skin: Skin is warm and dry.       Labs:        Invalid input(s): [id number]      Recent Labs      17   0734  177   SODIUM  135  136  140   POTASSIUM  5.0  4.8  5.4   CHLORIDE  95*  97  99   CO2  28  30  30   BUN  43*  35*  36*   CREATININE  7.42*  6.50*  5.48*   CALCIUM  9.1  9.3  9.9     Recent Labs      17   0734  17   0447   GLUCOSE  106*  96  94     Recent Labs      17   0734  17   0447   RBC   --   3.01*  2.90*  3.15*   HEMOGLOBIN   --   9.2*  8.9*  9.5*   HEMATOCRIT   --   28.6*  28.1*  30.0*   PLATELETCT   --   255  256  296   PROTHROMBTM  14.0   --    --    --    INR  1.10   --    --    --      Recent Labs      17   0734  177   WBC  6.6  4.5*  5.0   NEUTSPOLYS  83.40*   --   70.40   LYMPHOCYTES  6.50*   --   14.00*   MONOCYTES  6.70   --   8.80   EOSINOPHILS  2.40   --   6.00   BASOPHILS  0.50   --   0.60           Hemodynamics:  Temp (24hrs), Av.7 °C (98 °F), Min:36.4 °C (97.5 °F), Max:36.8 °C (98.2 °F)  Temperature: 36.4 °C (97.5 °F)  Pulse  Av   Min: 58  Max: 87   Blood Pressure : (!) 190/50 mmHg     Respiratory:    Respiration: 20, Pulse Oximetry: 97 %, O2 Daily Delivery Respiratory : Room Air with O2 Available     Given By:: Mouthpiece, Work Of Breathing / Effort: Mild  RUL Breath Sounds: Clear, RML Breath Sounds: Diminished, RLL Breath Sounds: Diminished, SILVANA Breath Sounds: Diminished, LLL Breath Sounds: Diminished  Fluids:    Intake/Output Summary (Last 24 hours) at 04/19/17 1347  Last data filed at 04/18/17 2000   Gross per 24 hour   Intake    740 ml   Output   2400 ml   Net  -1660 ml        GI/Nutrition:  Orders Placed This Encounter   Procedures   • Diet Order     Standing Status: Standing      Number of Occurrences: 1      Standing Expiration Date:      Order Specific Question:  Diet:     Answer:  Renal [8]     Medical Decision Making, by Problem:  Active Hospital Problems    Diagnosis   • Dyslipidemia [E78.5]   • Bacteremia due to Gram-positive bacteria [A49.9]   • Acute neck pain [M54.2]   • HTN (hypertension), malignant [I10]   • Anemia in CKD (chronic kidney disease) [N18.9, D63.1]   • End stage renal disease (HCC) [N18.6]   • BPH (benign prostatic hyperplasia) [N40.0]     1. ESRD - On HD  2. HTN -  BP is elevated, on multiple medications.  3. Bacteremia    -Patient on antibiotics; called and arranged vancomycin at outpatient HD unit until 6/12 with vancomycin trough levels being checked  -Still with pain   -Pericardial effusion persists, on low dose minoxidil, BP still elevated though ? Some of this is pain  Core Measures

## 2017-04-19 NOTE — PROGRESS NOTES
Updates received from Sera BYRD. No acute changes in pt status over the day. Wife at the bedside. All needs met at this time.

## 2017-04-19 NOTE — PROGRESS NOTES
Medications given, see MAR, assessment completed, see Doc Flowsheets.  Pt in bed resting, bed alarm not set, call light within reach.      1400:  Patient complaining of neck pain, ROCHELLE Moseley for MD notified.  Dr. Millan ordered Lidocaine patch for patient's upper back/neck    1430: Dialysis at bedside.  1700:  Patient's family at bedside.      1700: Patient complaining of pain, medication given, please see MAR.    1900: Gave report to ROCHELLE Senior.  Patient is in bed resting.  Bed is in lowest position, bed alarm not set, call light within reach.  Pt has no concerns or complaints at this time.

## 2017-04-19 NOTE — PROGRESS NOTES
Infectious Disease Progress Note    Author: Ara Whittaker M.D. Date of Service & Time created: 2017  10:35 AM    Chief Complaint:  Chief Complaint   Patient presents with   • Neck Pain   • Back Pain       Interval History:  76 year old male with esrd on d with neck pain and bacteremia  - still has significant pain. No fevers.   - no fevers. Wants to go home. Has significant pain in the neck.  Labs Reviewed, Medications Reviewed and Radiology Reviewed.    Review of Systems:  Review of Systems   Constitutional: Positive for malaise/fatigue. Negative for fever.   Respiratory: Negative for cough and shortness of breath.    Cardiovascular: Negative for chest pain and leg swelling.   Gastrointestinal: Negative for nausea, vomiting and diarrhea.   Genitourinary: Negative for dysuria.   Musculoskeletal: Positive for neck pain.        Significant neck pain   Neurological: Negative for headaches.       Hemodynamics:  Temp (24hrs), Av.7 °C (98 °F), Min:36.4 °C (97.5 °F), Max:36.8 °C (98.2 °F)  Temperature: 36.4 °C (97.5 °F)  Pulse  Av  Min: 58  Max: 87   Blood Pressure : (!) 190/50 mmHg       Physical Exam:  Physical Exam   Constitutional: He is oriented to person, place, and time.   Uncomfortable    HENT:   Mouth/Throat: No oropharyngeal exudate.   Eyes: No scleral icterus.   Neck: Neck supple.   Tenderness over the cervical spine   Cardiovascular: Regular rhythm.    No murmur heard.  Pulmonary/Chest: He has no wheezes. He has no rales.   Abdominal: Soft. There is no tenderness. There is no rebound.   Musculoskeletal: He exhibits no edema.   Neurological: He is alert and oriented to person, place, and time.   Vitals reviewed.      Meds:    Current facility-administered medications:   •  HYDROmorphone (DILAUDID) injection 0.5 mg, 0.5 mg, Intravenous, Q3HRS PRN, Mariaelena Millan M.D., 0.5 mg at 17 0356  •  heparin 1000 units/mL injection 1,500 Units, 1,500 Units, Intravenous, DIALYSIS PRN,  Malinda Key M.D., 1,500 Units at 04/17/17 1142  •  clopidogrel (PLAVIX) tablet 75 mg, 75 mg, Oral, DAILY, ANU NettlesOLise, 75 mg at 04/19/17 0910  •  hydrALAZINE (APRESOLINE) injection 10 mg, 10 mg, Intravenous, Q4HRS PRN, ANU NettlesO., 10 mg at 04/19/17 0405  •  hydrALAZINE (APRESOLINE) tablet 25 mg, 25 mg, Oral, Q8HRS, MEGHAN Nettles.O., 25 mg at 04/19/17 0621  •  albuterol (PROVENTIL) 2.5mg/0.5ml nebulizer solution 2.5 mg, 2.5 mg, Nebulization, Q4H PRN (RT), Aime Black M.D., 2.5 mg at 04/19/17 0944  •  cefTRIAXone (ROCEPHIN) 2 g in  mL IVPB, 2 g, Intravenous, Q24HRS, Aime Black M.D., Stopped at 04/18/17 2055  •  trazodone (DESYREL) tablet 150 mg, 150 mg, Oral, QHS, Aime Black M.D., 150 mg at 04/18/17 2020  •  pravastatin (PRAVACHOL) tablet 10 mg, 10 mg, Oral, Nightly, Aime Black M.D., 10 mg at 04/18/17 2018  •  minoxidil (LONITEN) tablet 2.5 mg, 2.5 mg, Oral, DAILY, Aime Black M.D., 2.5 mg at 04/19/17 0907  •  labetalol (NORMODYNE) tablet 500 mg, 500 mg, Oral, BID, Aime Black M.D., 500 mg at 04/19/17 0905  •  furosemide (LASIX) tablet 80 mg, 80 mg, Oral, BID, Aime Black M.D., 80 mg at 04/19/17 0624  •  amlodipine (NORVASC) tablet 10 mg, 10 mg, Oral, Q DAY, Aime Black M.D., 10 mg at 04/19/17 0908  •  multivitamin (THERAGRAN) tablet 1 Tab, 1 Tab, Oral, DAILY, Aime Black M.D., 1 Tab at 04/19/17 0909  •  calcium acetate (PHOS-LO) 667 MG capsule 1,334 mg, 1,334 mg, Oral, TID WITH MEALS, Aime Black M.D., 1,334 mg at 04/18/17 1809  •  epoetin hilario (EPOGEN,PROCRIT) injection 2,000 Units, 2,000 Units, Subcutaneous, MO, WE + FR, Aime Black M.D., Stopped at 04/19/17 0900  •  lisinopril (PRINIVIL) tablet 40 mg, 40 mg, Oral, BID, Aime Black M.D., 40 mg at 04/19/17 0914  •  omeprazole (PRILOSEC) capsule 40 mg, 40 mg, Oral, DAILY, Aime Black M.D., 20 mg at 04/17/17 0857  •  tamsulosin (FLOMAX) capsule 0.8 mg, 0.8 mg, Oral, AFTER BREAKFAST, Aime Black M.D., 0.8 mg at  04/19/17 0908  •  isosorbide dinitrate (ISORDIL) tablet 10 mg, 10 mg, Oral, TID, Aime Black M.D., 10 mg at 04/19/17 0910  •  senna-docusate (PERICOLACE or SENOKOT S) 8.6-50 MG per tablet 2 Tab, 2 Tab, Oral, BID, 2 Tab at 04/18/17 2021 **AND** polyethylene glycol/lytes (MIRALAX) PACKET 1 Packet, 1 Packet, Oral, QDAY PRN **AND** magnesium hydroxide (MILK OF MAGNESIA) suspension 30 mL, 30 mL, Oral, QDAY PRN **AND** bisacodyl (DULCOLAX) suppository 10 mg, 10 mg, Rectal, QDAY PRN, Aime Black M.D.  •  Respiratory Care per Protocol, , Nebulization, Continuous RT, Aime Black M.D.  •  heparin injection 5,000 Units, 5,000 Units, Subcutaneous, Q8HRS, Aime Black M.D., Stopped at 04/18/17 1400  •  ondansetron (ZOFRAN) syringe/vial injection 4 mg, 4 mg, Intravenous, Q4HRS PRN, Aime Black M.D.  •  ondansetron (ZOFRAN ODT) dispertab 4 mg, 4 mg, Oral, Q4HRS PRN, Aime Black M.D.  •  acetaminophen (TYLENOL) tablet 650 mg, 650 mg, Oral, Q6HRS, Aime Black M.D., 650 mg at 04/19/17 0624  •  hydrALAZINE (APRESOLINE) injection 10-20 mg, 10-20 mg, Intravenous, Q6HRS PRN, Aime Black M.D., 10 mg at 04/19/17 0627  •  oxycodone-acetaminophen (PERCOCET) 5-325 MG per tablet 1-2 Tab, 1-2 Tab, Oral, Q6HRS PRN, Aime Black M.D., 2 Tab at 04/19/17 0904    Labs:  Recent Labs      04/17/17   0759  04/18/17   0734  04/19/17   0447   WBC  6.6  4.5*  5.0   RBC  3.01*  2.90*  3.15*   HEMOGLOBIN  9.2*  8.9*  9.5*   HEMATOCRIT  28.6*  28.1*  30.0*   MCV  95.0  96.9  95.2   MCH  30.6  30.7  30.2   RDW  51.3*  52.5*  51.5*   PLATELETCT  255  256  296   MPV  10.1  9.4  9.6   NEUTSPOLYS  83.40*   --   70.40   LYMPHOCYTES  6.50*   --   14.00*   MONOCYTES  6.70   --   8.80   EOSINOPHILS  2.40   --   6.00   BASOPHILS  0.50   --   0.60     Recent Labs      04/17/17   0759  04/18/17   0734  04/19/17   0447   SODIUM  135  136  140   POTASSIUM  5.0  4.8  5.4   CHLORIDE  95*  97  99   CO2  28  30  30   GLUCOSE  106*  96  94   BUN  43*  35*  36*      Recent Labs      04/17/17   0759  04/18/17   0734  04/19/17   0447   CREATININE  7.42*  6.50*  5.48*       Imaging:  Ct-head W/o    4/16/2017 4/16/2017 11:13 PM HISTORY/REASON FOR EXAM:  Malignant hypertension, neck pain. TECHNIQUE/EXAM DESCRIPTION AND NUMBER OF VIEWS: CT of the head without contrast. The study was performed on a helical multidetector CT scanner. Contiguous 2.5 mm axial sections were obtained from the skull base through the vertex. Up to date radiation dose reduction adjustments have been utilized to meet ALARA standards for radiation dose reduction. COMPARISON:  None available FINDINGS: Lateral ventricles are normal in size and symmetric. Cortical sulci are moderately enlarged. Patchy areas of low attenuation in the white matter bilaterally. Focal low density in the LEFT basal ganglia consistent with prominent perivascular space. No significant mass effect or midline shift. Basal cisterns are patent. No evidence for intracranial hemorrhage. Calvaria are intact. Orbits show bilateral proptosis. Visualized mastoid air cells are clear. RIGHT maxillary sinus polyp or retention cyst. Calcifications of the carotid arteries noted.     4/16/2017  1.  Diffuse atrophy and white matter changes. 2.  No acute intracranial hemorrhage or territorial infarct.     Dx-chest-2 Views    3/21/2017  3/21/2017 3:00 PM HISTORY/REASON FOR EXAM:  Shortness of Breath Short of breath TECHNIQUE/EXAM DESCRIPTION AND NUMBER OF VIEWS: Two views of the chest. COMPARISON:  8/12/2016. FINDINGS: No pulmonary infiltrates or consolidations are noted. No pleural effusions, no pneumothorax are appreciated. Stable enlarged cardiopericardial silhouette. Calcified aortic knob.     3/21/2017  1. No pulmonary infiltrates or consolidations are noted. 2. Stable cardiomegaly.    Dx-chest-portable (1 View)    4/15/2017  4/15/2017 4:24 PM HISTORY/REASON FOR EXAM: Chest Pain. Back and neck pain. Coronary artery disease TECHNIQUE/EXAM  DESCRIPTION AND NUMBER OF VIEWS: Single AP view of the chest. COMPARISON: 3/21/2017 FINDINGS: Vascular stent is seen just inferior to the left clavicle Lungs: No consolidation detected. Pleura:  No pleural space process is seen. Heart and mediastinum: There is stable marked cardiac silhouette enlargement.     4/15/2017  Stable marked cardiac silhouette enlargement without consolidation identified Left subclavian stent    Mr-cervical Spine-with & W/o    4/18/2017 4/18/2017 8:06 AM HISTORY/REASON FOR EXAM:  Atraumatic Pain/Paresis. TECHNIQUE/EXAM DESCRIPTION: MRI of the cervical spine without and with contrast. The study was performed on a idemamaa 1.5 Viviane MRI scanner. T1 sagittal, T2 fast spin-echo sagittal, T2 fat suppressed sagittal and gradient echo axial images were obtained of the cervical spine. Optional T2 axial images may also be obtained. T1 post-contrast fat suppressed sagittal images were obtained of the cervical spine. Optional T1 post-contrast fat-suppressed axial images may be obtained. 8 mL MultiHance contrast was administered intravenously. COMPARISON: C-spine series 7/14/2016 FINDINGS: There is marked irregularity of the endplates at the C6-7 level. There is subtle increased T2 signal intensity in the C6-7 disc space. Additionally there is diffuse decreased T1 and increased T2 signal intensity throughout the C6 and superior aspect of the C7 vertebral bodies. There is also slight enhancement in this region as well. There is moderate disc space narrowing at the C5-6 level. There are bandlike regions of increased T1 and T2 signal intensity about the adjacent endplates. Additionally there is linear increased T2 signal intensity in the prevertebral soft tissues from the C2 level to the C6 level. There is mild disc space narrowing at the C3-4 and C7-T1 levels There is 3 mm of anterolisthesis at this C7-T1 level. There are mild anterior osteophytic changes at the C5-6 and C6-7 levels. There are no  anomalies at the craniovertebral junction. The cervical spinal cord is normal in caliber and signal throughout its course. There is no abnormal cord enhancement. There is no abnormal intradural extramedullary enhancement. Level specific findings: C2-3 level normal. C3-4 level mild to moderate broad-based posterior spurring which flattens the ventral surface of the cord and contributes to a mild central canal stenosis. Severe left-sided neural foraminal stenosis and mild to moderate right-sided neural foraminal stenosis. C4-5 level mild posterior spurring. Borderline central canal stenosis. Moderate left-sided neural foraminal stenosis and mild to moderate right-sided neural foraminal stenosis. C5-6 level moderate broad-based posterior spurring which flattens the  ventral surface of the cord and causes a mild to moderate central canal stenosis. Severe right-sided neural foraminal stenosis and mild to moderate left-sided neural foraminal stenosis. C6-7 level mild broad-based posterior spurring which abuts the ventral surface of the cord and causes a mild central canal stenosis. Mild to moderate bilateral neural foraminal stenosis. C7-T1 level minimal posterior spurring. Mild bilateral neural foraminal stenosis.     4/18/2017  1.  Evidence of subacute discitis/osteomyelitis at the C6-7 level. 2.  Mild degenerative anterolisthesis at C7-T1 level. 3.  Prevertebral inflammatory/infectious changes from the C2 level to the C6 level. 4.  Mild to moderate central canal stenosis at the C5-6 level with mild central canal stenosis at the C3-4 and C6-7 levels. 5.  Moderate cervical spondylotic change at the C5-6 level which flattens the ventral surface of the cord. Mild cervical spondylotic changes at the C3-4, C4-5, and C6-7 levels. 6.  Moderate to severe multilevel neural foraminal stenosis.    Echocardiogram Comp W/o Cont    4/11/2017  Transthoracic Echo Report Echocardiography Laboratory CONCLUSIONS Prior echo 05/09/16 Normal  left ventricular chamber size. Moderate concentric left ventricular hypertrophy. Normal left ventricular systolic function. Left ventricular ejection fraction is visually estimated to be 65%. Normal regional wall motion. Diastolic function is difficult to assess. Enlarged right atrium. Dilated inferior vena cava without inspiratory collapse. Mildly dilated left atrium. Structurally normal aortic valve. Aortic sclerosis with mild stenosis. Vmax is 2.6  m/s. Transvalvular gradients are - Peak: 27 mmHg, Mean: 17 mmHg. Aortic valve area calculated from the continuity equation is 2.1 sq cm. No aortic insufficiency. Moderate tricuspid regurgitation. Estimated right ventricular systolic pressure  is 60 mmHg. Compared to the report of the study done - there has been.  Moderate pericardial effusion without evidence of hemodynamic compromise. Compared to the report of the study done - there has been and increase in the pericardial effusion and slight increase in the estimated RVSP. Known dialysis patient. EDVIN GREEN Exam Date:         2017                    16:23 Exam Location:     Out Patient Priority:          Routine Ordering Physician:        SHELIA WALL Referring Physician:       DUKE Ann Sonographer:               Mandy Palm Age:    76     Gender:    M MRN:    [id number] :    1940 BSA:    1.85   Ht (in):    67     Wt (lb):    162 Exam Type:     Complete Indications:     Dyspnea ICD Codes:       786.09 CPT Codes:       43852 BP:          /          HR: Technical Quality:       Fair MEASUREMENTS  (Male / Female) Normal Values 2D ECHO LV Diastolic Diameter PLAX        4.7 cm                4.2 - 5.9 / 3.9 - 5.3 cm LV Systolic Diameter PLAX         3.5 cm                2.1 - 4.0 cm IVS Diastolic Thickness           1.7 cm                LVPW Diastolic Thickness          1.4 cm                RV Diameter 4C                    3 cm                  2.5 - 2.9 cm LVOT Diameter                      2.2 cm                RA Diameter                       4.6 cm                Estimated LV Ejection Fraction    65 %                  LV Ejection Fraction MOD BP       61.7 %                >= 55  % LV Ejection Fraction MOD 4C       60 %                  LV Ejection Fraction MOD 2C       69.4 %                LA Volume Index                   39.2 cm³/m²           16 - 28 cm³/m² IVC Diameter                      2.6 cm                M-MODE Aortic Root Diameter MM           3 cm                  DOPPLER AV Peak Velocity                  2.4 m/s               AV Peak Gradient                  23.8 mmHg             AV Mean Gradient                  14.2 mmHg             LVOT Peak Velocity                1.2 m/s               AV Area Cont Eq vti               2.1 cm²               Mitral E Point Velocity           0.93 m/s              Mitral E to A Ratio               0.99                  Mitral A Duration                 141 ms                MV Pressure Half Time             59.7 ms               MV Area PHT                       3.7 cm²               MV Deceleration Time              206 ms                MR Flow Convergence Radius        0.52 cm               TV Peak E Velocity                0.52 m/s              TR Peak Velocity                  319 cm/s              PV Peak Velocity                  2 m/s                 PV Peak Gradient                  16 mmHg               RVOT Peak Velocity                0.77 m/s              MV E' Velocity                    8.1 cm/s              * Indicates values subject to auto-interpretation LV EF:  65    % FINDINGS Left Ventricle Normal left ventricular chamber size. Moderate concentric left ventricular hypertrophy. Normal left ventricular systolic function. Left ventricular ejection fraction is visually estimated to be 65%. Normal regional wall motion. Diastolic function is difficult to assess. Right Ventricle The right ventricle was normal in size and function.  Right Atrium Enlarged right atrium. Dilated inferior vena cava without inspiratory collapse. Left Atrium Mildly dilated left atrium. Left atrial volume index is 39  mL/sq m. Mitral Valve Structurally normal mitral valve. Mitral annular calcification. No mitral stenosis. Trace mitral regurgitation. Aortic Valve Structurally normal aortic valve. Aortic sclerosis with mild stenosis. Vmax is 2.6  m/s. Transvalvular gradients are - Peak: 27 mmHg, Mean: 17 mmHg. Aortic valve area calculated from the continuity equation is 2.1 sq cm. No aortic insufficiency. Tricuspid Valve Structurally normal tricuspid valve without significant stenosis. Moderate tricuspid regurgitation. Estimated right ventricular systolic pressure  is 60 mmHg. Pulmonic Valve The pulmonic valve is not well visualized. No stenosis or regurgitation seen. Pericardium Normal pericardium with effusion. Largest measurement 3 cm. Moderate pericardial effusion without evidence of hemodynamic compromise. Aorta The aortic root is normal. James Mendoza M.D. (Electronically Signed) Final Date:     11 April 2017                 17:23    Ct-cta Complete Thoracoabdominal Aorta    4/15/2017  4/15/2017 5:31 PM HISTORY/REASON FOR EXAM:  Chest Pain TECHNIQUE/EXAM DESCRIPTION:  CT angiogram of the chest and abdomen with and without reconstructions. Initial precontrast images were obtained from the lung apices through the diaphragmatic domes. Following this, 100 mL of Omnipaque 350 nonionic contrast was administered at 5 mL/sec and helical scanning was obtained from the lung apices thru the iliac crest and bifurcation. Thick and thin section multiplanar volume reformats were generated from the axial data set in the sagittal and coronal planes. 3D angiographic images were reviewed on PACS. Maximum intensity projection (MIP) images were generated and reviewed. Low dose optimization technique was utilized for this CT exam including automated exposure control and adjustment  of the mA and/or kV according to patient size. COMPARISON:  August 12, 2016 FINDINGS: Noncontrast images: There is no intramural hematoma. There is calcified plaque in the aorta. Coronary artery calcifications are identified. There is a moderate sized pericardial effusion. Contrast images: Aorta and vasculature: No evidence of thoracic aortic aneurysm or dissection. Images of arch are somewhat degraded by motion artifact. There is coarse calcified plaque in the abdominal aorta without aneurysm. There is no mediastinal or hilar adenopathy. There is trace pleural effusions. There is dependent atelectasis. There is mild diffuse groundglass consolidation suggesting mild edema. There is again hyperenhancement in the left hepatic lobe is nonspecific. Hepatic cysts are once again noted in the right hepatic lobe. Spleen is normal in size. Pancreas is unremarkable. There is again gallbladder wall thickening likely related to congestive heart failure. There is again diffuse enlargement of the adrenal gland suggesting hyperplasia. Kidneys enhance symmetrically. There is bilateral renal cortical thinning with multiple bilateral renal cysts. Hyperdense cyst once again noted arising from the lower pole of the right kidney. The bowel is nondilated. There is trace ascites.     4/15/2017  1.  Aortic atherosclerosis without evidence of dissection or aneurysm. 2.  Moderate pericardial effusion with pulmonary edema and trace pleural effusions. 3.  Persistent ill-defined hyperenhancement within the medial segment of the left hepatic lobe could related to shunting though hypervascular lesion is difficult to exclude. Recommend follow-up liver CT or MRI. 4.  Bilateral renal cortical thinning with multiple cysts.       Micro:  Results     Procedure Component Value Units Date/Time    BLOOD CULTURE [[id number]]  (Abnormal) Collected:  04/15/17 2050    Order Status:  Completed Specimen Information:  Blood from Peripheral Updated:  04/18/17 1301  "    Significant Indicator POS (POS)      Source BLD      Site PERIPHERAL      Blood Culture -- (A)      Result:        Blood culture testing and Gram stain, if indicated, are  performed at [facility], [address] [place] [address].  Positive blood cultures are  sent to [facility], [address] [place] [address], for organism identification and  susceptibility testing.  Growth detected by Bactec instrument.       Blood Culture -- (A)      Result:        Viridans Streptococcus  See previous culture for sensitivity report.      Narrative:      CALL  [facility]  MED tel. [phone],  CALLED  MED tel. [phone] 04/16/2017, 11:28, RB PERF. RESULTS CALLED  TO:[id number],rn  Per Hospital Policy: Only change Specimen Src: to \"Line\" if  specified by physician order.    BLOOD CULTURE [[id number]]  (Abnormal)  (Susceptibility) Collected:  04/15/17 2055    Order Status:  Completed Specimen Information:  Blood from Peripheral Updated:  04/18/17 1301     Significant Indicator POS (POS)      Source BLD      Site PERIPHERAL      Blood Culture -- (A)      Result:        Blood culture testing and Gram stain, if indicated, are  performed at [facility], [address] [place] [address].  Positive blood cultures are  sent to [facility], [address] [place] [address], for organism identification and  susceptibility testing.  Growth detected by Bactec instrument.       Blood Culture Viridans Streptococcus (A)     Narrative:      CALL  [facility]  MED tel. [phone],  CALLED  MED tel. [phone] 04/16/2017, 10:13, RB PERF. RESULTS CALLED  TO:[id number],rn  Per Hospital Policy: Only change Specimen Src: to \"Line\" if  specified by physician order.    Culture & Susceptibility     VIRIDANS STREPTOCOCCUS     Antibiotic Sensitivity Microscan Unit Status    Cefotaxime Sensitive 0.064 mcg/mL Final    Penicillin Sensitive 0.125 mcg/mL Final                       BLOOD " "CULTURE [[id number]] Collected:  04/17/17 0754    Order Status:  Completed Specimen Information:  Blood from Peripheral Updated:  04/18/17 0936     Significant Indicator NEG      Source BLD      Site PERIPHERAL      Blood Culture --      Result:        No Growth    Note: Blood cultures are incubated for 5 days and  are monitored continuously.Positive blood cultures  are called to the RN and reported as soon as  they are identified.      Narrative:      Per Hospital Policy: Only change Specimen Src: to \"Line\" if  specified by physician order.    BLOOD CULTURE [[id number]] Collected:  04/17/17 0759    Order Status:  Completed Specimen Information:  Blood from Peripheral Updated:  04/18/17 0936     Significant Indicator NEG      Source BLD      Site PERIPHERAL      Blood Culture --      Result:        No Growth    Note: Blood cultures are incubated for 5 days and  are monitored continuously.Positive blood cultures  are called to the RN and reported as soon as  they are identified.      Narrative:      Per Hospital Policy: Only change Specimen Src: to \"Line\" if  specified by physician order.           Assessment:  Active Hospital Problems    Diagnosis   • Dyslipidemia [E78.5]   • Bacteremia due to Gram-positive bacteria [A49.9]   • Acute neck pain [M54.2]   • HTN (hypertension), malignant [I10]   • Anemia in CKD (chronic kidney disease) [N18.9, D63.1]   • End stage renal disease (HCC) [N18.6]   • BPH (benign prostatic hyperplasia) [N40.0]       Plan:  Strep viridans bacteremia  D/c vanco  Continue rocephin while in the hospital  Blood c/s are neg from 4/17 so far  C6-7 om/ diskitis  Continue rocephin  Aim for 6-8  Weeks of abx  at least. Will ask for vanco during Hd thro 6/12  Needs outpt consult with neurosurgery asap    Prevertebral phlegmon  Continue to monitor  Will see neurosurgery as an outpt    Pericardial effusion  Seen by cards  Do not think its infected    esrd on Hd  Renal to arrange for outpt vanco during " HD      Discussed with internal Medicine

## 2017-04-19 NOTE — PROGRESS NOTES
"Interval History:  A 76-year-old man is seen in consultation at the  request of Dr. Millan for pericardial effusion  No cardiac status changes    Physical Exam   Blood pressure 190/50, pulse 84, temperature 36.4 °C (97.5 °F), resp. rate 20, height 1.727 m (5' 8\"), weight 75.8 kg (167 lb 1.7 oz), SpO2 97 %.    Constitutional:  He appears well-developed.   HENT: Normocephalic and atraumatic. No scleral icterus.   Neck: No JVD present.   Cardiovascular: Normal rate. Exam reveals no gallop and no friction rub. 3/6 SE at apex and AoV murmur heard.   Pulmonary/Chest: CTAB coarse   Abdominal: S/NT/ND BS+   Musculoskeletal: He exhibits no edema. Pulses present.  Skin: Skin is warm and dry.       Intake/Output Summary (Last 24 hours) at 04/19/17 1258  Last data filed at 04/18/17 2000   Gross per 24 hour   Intake    740 ml   Output   2400 ml   Net  -1660 ml       LABS:  Lab Results   Component Value Date/Time    CHOLESTEROL,TOT 92* 03/09/2017 09:05 AM    LDL 27 03/09/2017 09:05 AM    HDL 60 03/09/2017 09:05 AM    TRIGLYCERIDES 27 03/09/2017 09:05 AM       Lab Results   Component Value Date/Time    WBC 5.0 04/19/2017 04:47 AM    RBC 3.15* 04/19/2017 04:47 AM    HEMOGLOBIN 9.5* 04/19/2017 04:47 AM    HEMATOCRIT 30.0* 04/19/2017 04:47 AM    MCV 95.2 04/19/2017 04:47 AM    NEUTROPHILS-POLYS 70.40 04/19/2017 04:47 AM    LYMPHOCYTES 14.00* 04/19/2017 04:47 AM    MONOCYTES 8.80 04/19/2017 04:47 AM    EOSINOPHILS 6.00 04/19/2017 04:47 AM    BASOPHILS 0.60 04/19/2017 04:47 AM     Lab Results   Component Value Date/Time    SODIUM 140 04/19/2017 04:47 AM    POTASSIUM 5.4 04/19/2017 04:47 AM    CHLORIDE 99 04/19/2017 04:47 AM    CO2 30 04/19/2017 04:47 AM    GLUCOSE 94 04/19/2017 04:47 AM    BUN 36* 04/19/2017 04:47 AM    CREATININE 5.48* 04/19/2017 04:47 AM         Lab Results   Component Value Date/Time    ALKALINE PHOSPHATASE 101* 04/15/2017 04:15 PM    AST(SGOT) 18 04/15/2017 04:15 PM    ALT(SGPT) 15 04/15/2017 04:15 PM    TOTAL " BILIRUBIN 0.9 04/15/2017 04:15 PM      Lab Results   Component Value Date/Time    B NATRIURETIC PEPTIDE 1583* 04/15/2017 04:15 PM      No results found for: TSH  Lab Results   Component Value Date/Time    PT 14.0 04/17/2017 07:54 AM    INR 1.10 04/17/2017 07:54 AM        Medications reviewed    Imaging reviewed    ECHO(4/11/2017):Prior echo 05/09/16  Normal left ventricular chamber size. Moderate concentric left   ventricular hypertrophy. Normal left ventricular systolic function.   Left ventricular ejection fraction is visually estimated to be 65%.   Normal regional wall motion. Diastolic function is difficult to assess.  Enlarged right atrium. Dilated inferior vena cava without inspiratory   collapse.  Mildly dilated left atrium.  Structurally normal aortic valve. Aortic sclerosis with mild stenosis.   Vmax is 2.6  m/s. Transvalvular gradients are - Peak: 27 mmHg, Mean: 17   mmHg. Aortic valve area calculated from the continuity equation is 2.1   sq cm. No aortic insufficiency.  Moderate tricuspid regurgitation. Estimated right ventricular systolic   pressure  is 60 mmHg.  Compared to the report of the study done - there has been.    Moderate pericardial effusion without evidence of hemodynamic   compromise.   Compared to the report of the study done - there has been and increase   in the pericardial effusion and slight increase in the estimated RVSP.   Known dialysis patient.    Impressions:  1.  Chronic pericardial effusion, now increased from small to moderate over 11   months.  2.  Urosepsis, treated and improved.  3.  Chronic renal failure.  4.  Severe hypertension, chronic and acute.  5.  End-stage renal disease.  6.  Sleep apnea.    Recommendations:  Chronic pericardial effusion, no tamponade pathology  Monitor only and follow as outpatient in cardiology    [facility]

## 2017-04-19 NOTE — FLOWSHEET NOTE
04/19/17 0940   Events/Summary/Plan   Events/Summary/Plan Patient wnted treatment when asked   Interdisciplinary Plan of Care-Goals (Indications)   Obstructive Ventilatory Defect or Pulmonary Disease without Obvious Obstruction History / Diagnosis   Interdisciplinary Plan of Care-Outcomes    Bronchodilator Outcome Improved Patient Appearance with Decreased use of Accessory Muscles   Education   Education Yes - Pt. / Family has been Instructed in use of Respiratory Medications and Adverse Reactions   RT Assessment of Delivered Medications   Evaluation of Medication Delivery Daily Yes-- Pt /Family has been Instructed in use of Respiratory Medications and Adverse Reactions   SVN Group   #SVN Performed Yes   Given By: Mouthpiece   Date SVN Last Changed 04/19/17   Date SVN Next Change Due (Q 7 Days) 04/26/17   Chest Exam   Work Of Breathing / Effort Mild   Respiration 20   Pulse 84   Breath Sounds   RUL Breath Sounds Clear   RML Breath Sounds Diminished   RLL Breath Sounds Diminished   SILVANA Breath Sounds Diminished   LLL Breath Sounds Diminished   Oximetry   #Pulse Oximetry (Single Determination) Yes   Oxygen   Home O2 LPM Flow 2.5 LPM   Home O2 Frequency of Use At Sleep  (and as needed)   Pulse Oximetry 97 %   O2 (LPM) 0   O2 Daily Delivery Respiratory  Room Air with O2 Available

## 2017-04-19 NOTE — DISCHARGE PLANNING
Patient was discussed in morning rounds this morning.  Patient's discharge plan is to return home with his spouse when medically able. No current SS needs noted.

## 2017-04-19 NOTE — CONSULTS
DATE OF SERVICE:  04/18/2017    HISTORY OF PRESENT ILLNESS:  A 76-year-old man is seen in consultation at the   request of Dr. Millan for pericardial effusion.  This patient was admitted   on April 15th for neck pain and urosepsis from which he is improving.  He also   has severe hypertension.  He clinically is improving.  Echocardiogram was   done demonstrating a moderate pericardial effusion as well as aortic valve   disease with mild aortic stenosis.  Given this echo report was compared with   prior echo of 11 months ago, pericardial effusion, which was small at time and   has increased to moderate.   Here, the   patient has severe hypertension.  He is on chronic hemodialysis, previous of   valvular disease has been documented before, dominant mild aortic stenosis,   prior history pertinent for COPD, oxygen dependent, severe hypertension and   now chronic renal failure with dialysis.  He also has sleep apnea on oxygen,   prior history of peripheral arterial disease, with PCI  to his left   femoral artery.    FAMILY HISTORY:  Pertinent for lung disease.    SOCIAL HISTORY:  He is .  His wife is at bedside.    ALLERGIES:  None known to drugs.    MEDICATIONS:  In the hospital include albuterol, Norvasc, Rocephin, Plavix,   Epogen, Lasix 80 mg twice a day, prophylactic heparin, hydralazine 25 mg every   8 hours, some Dilaudid, lisinopril 40 mg daily, minoxidil 2.5 mg a day,   omeprazole 20 mg per day.  He takes trazodone for sleep, and Flomax.    PHYSICAL EXAMINATION:  GENERAL:  Reveals an alert, athletic looking man.  VITAL SIGNS:  Blood pressure 167/80, heart rate 70 and regular.  SKIN:  Warm and dry.  HEENT:  Extraocular muscles normal.  Oropharynx clear to inspection.  NECK:  Neck veins were examined not distended.  No carotid bruits.  LUNGS:  Clear to auscultation, although he does have a mild productive cough.  HEART:  PMI not palpable.  S1, S2 are normal.  Grade III/VI systolic murmur   heard in the  aortic region and apex.  No diastolic murmur, rub or gallop.  ABDOMEN:  Unremarkable.  EXTREMITIES:  Normal peripheral pulse palpable and normal.    IMPRESSION:  1.  Chronic pericardial effusion, now increased from small to moderate over 11   months.  2.  Urosepsis, treated and improved.  3.  Chronic renal failure.  4.  Severe hypertension, chronic and acute.  5.  End-stage renal disease.  6.  Sleep apnea.    RECOMMENDATIONS:  I have no concerns about the change in his pericardial   effusion.  There is clearly no tamponade and I doubt that he has purulent   pericarditis.  This is probably related to systemic inflammatory response of   his acute illness and I would suggest no further cardiac evaluation except   more aggressive blood pressure management and chronic hemodialysis and follow   up echo in 2-3 months.  The patient understands my advice and his wife is   ready to agree with this approach.    Thank you for this consult.       ____________________________________     MD WANDA Gong / DELANO    DD:  04/18/2017 18:28:21  DT:  04/18/2017 19:52:19    D#:  [id number]  Job#:  [id number]

## 2017-04-19 NOTE — PROGRESS NOTES
2000: Pt assessed and medicated per MD order. Pt reports pain at 7/10. Medicated per MAR. Pt has no further complaints at this time. Able to swallow medications without issues. Family at the bedside to visit.   2045: IV has small leak during IV antibiotic infusion. New IV placed x1 attempt. Pt tolerated well.   2315: Pt medicated for pain. BP rechecked. No further needs at this time.   0200: Pt sleeping with no s/s of distress.   0400: Pt medicated for pain. Pt also hypertensive. Medicated per MAR. No further needs at this time.   0615: Pt medicated per MD order. Pt remains hypertensive. Additional 10mg hydralazine given. Heat pack provided for pain.

## 2017-04-20 VITALS
DIASTOLIC BLOOD PRESSURE: 72 MMHG | HEIGHT: 68 IN | SYSTOLIC BLOOD PRESSURE: 172 MMHG | WEIGHT: 167.11 LBS | HEART RATE: 88 BPM | BODY MASS INDEX: 25.33 KG/M2 | OXYGEN SATURATION: 89 % | RESPIRATION RATE: 20 BRPM | TEMPERATURE: 97.4 F

## 2017-04-20 PROBLEM — I31.39 PERICARDIAL EFFUSION: Status: ACTIVE | Noted: 2017-04-20

## 2017-04-20 PROBLEM — I35.8 AORTIC VALVE SCLEROSIS: Status: ACTIVE | Noted: 2017-04-20

## 2017-04-20 LAB
ANION GAP SERPL CALC-SCNC: 13 MMOL/L (ref 0–11.9)
BASOPHILS # BLD AUTO: 1.3 % (ref 0–1.8)
BASOPHILS # BLD: 0.05 K/UL (ref 0–0.12)
BUN SERPL-MCNC: 32 MG/DL (ref 8–22)
CALCIUM SERPL-MCNC: 9.7 MG/DL (ref 8.4–10.2)
CHLORIDE SERPL-SCNC: 95 MMOL/L (ref 96–112)
CO2 SERPL-SCNC: 30 MMOL/L (ref 20–33)
CREAT SERPL-MCNC: 4.87 MG/DL (ref 0.5–1.4)
EKG IMPRESSION: NORMAL
EOSINOPHIL # BLD AUTO: 0.44 K/UL (ref 0–0.51)
EOSINOPHIL NFR BLD: 11.3 % (ref 0–6.9)
ERYTHROCYTE [DISTWIDTH] IN BLOOD BY AUTOMATED COUNT: 50.7 FL (ref 35.9–50)
GFR SERPL CREATININE-BSD FRML MDRD: 12 ML/MIN/1.73 M 2
GLUCOSE SERPL-MCNC: 88 MG/DL (ref 65–99)
HCT VFR BLD AUTO: 28.3 % (ref 42–52)
HGB BLD-MCNC: 8.8 G/DL (ref 14–18)
IMM GRANULOCYTES # BLD AUTO: 0.01 K/UL (ref 0–0.11)
IMM GRANULOCYTES NFR BLD AUTO: 0.3 % (ref 0–0.9)
LYMPHOCYTES # BLD AUTO: 0.68 K/UL (ref 1–4.8)
LYMPHOCYTES NFR BLD: 17.5 % (ref 22–41)
MCH RBC QN AUTO: 30.1 PG (ref 27–33)
MCHC RBC AUTO-ENTMCNC: 31.1 G/DL (ref 33.7–35.3)
MCV RBC AUTO: 96.9 FL (ref 81.4–97.8)
MONOCYTES # BLD AUTO: 0.46 K/UL (ref 0–0.85)
MONOCYTES NFR BLD AUTO: 11.9 % (ref 0–13.4)
NEUTROPHILS # BLD AUTO: 2.24 K/UL (ref 1.82–7.42)
NEUTROPHILS NFR BLD: 57.7 % (ref 44–72)
NRBC # BLD AUTO: 0 K/UL
NRBC BLD AUTO-RTO: 0 /100 WBC
PLATELET # BLD AUTO: 304 K/UL (ref 164–446)
PMV BLD AUTO: 9.7 FL (ref 9–12.9)
POTASSIUM SERPL-SCNC: 4.7 MMOL/L (ref 3.6–5.5)
RBC # BLD AUTO: 2.92 M/UL (ref 4.7–6.1)
SODIUM SERPL-SCNC: 138 MMOL/L (ref 135–145)
WBC # BLD AUTO: 3.9 K/UL (ref 4.8–10.8)

## 2017-04-20 PROCEDURE — 700102 HCHG RX REV CODE 250 W/ 637 OVERRIDE(OP): Performed by: FAMILY MEDICINE

## 2017-04-20 PROCEDURE — 700111 HCHG RX REV CODE 636 W/ 250 OVERRIDE (IP): Performed by: FAMILY MEDICINE

## 2017-04-20 PROCEDURE — 700111 HCHG RX REV CODE 636 W/ 250 OVERRIDE (IP): Performed by: INTERNAL MEDICINE

## 2017-04-20 PROCEDURE — 85025 COMPLETE CBC W/AUTO DIFF WBC: CPT

## 2017-04-20 PROCEDURE — 700102 HCHG RX REV CODE 250 W/ 637 OVERRIDE(OP): Performed by: HOSPITALIST

## 2017-04-20 PROCEDURE — 80048 BASIC METABOLIC PNL TOTAL CA: CPT

## 2017-04-20 PROCEDURE — 700101 HCHG RX REV CODE 250: Performed by: HOSPITALIST

## 2017-04-20 PROCEDURE — 99238 HOSP IP/OBS DSCHRG MGMT 30/<: CPT | Performed by: FAMILY MEDICINE

## 2017-04-20 PROCEDURE — A9270 NON-COVERED ITEM OR SERVICE: HCPCS | Performed by: HOSPITALIST

## 2017-04-20 PROCEDURE — 700101 HCHG RX REV CODE 250: Performed by: FAMILY MEDICINE

## 2017-04-20 PROCEDURE — 700105 HCHG RX REV CODE 258: Performed by: INTERNAL MEDICINE

## 2017-04-20 PROCEDURE — 94640 AIRWAY INHALATION TREATMENT: CPT

## 2017-04-20 PROCEDURE — A9270 NON-COVERED ITEM OR SERVICE: HCPCS | Performed by: FAMILY MEDICINE

## 2017-04-20 PROCEDURE — 700111 HCHG RX REV CODE 636 W/ 250 OVERRIDE (IP): Performed by: HOSPITALIST

## 2017-04-20 RX ORDER — OXYCODONE HYDROCHLORIDE AND ACETAMINOPHEN 5; 325 MG/1; MG/1
1-2 TABLET ORAL EVERY 6 HOURS PRN
Qty: 20 TAB | Refills: 0 | Status: SHIPPED | OUTPATIENT
Start: 2017-04-20 | End: 2018-03-09

## 2017-04-20 RX ORDER — MORPHINE SULFATE 30 MG/1
30 TABLET, FILM COATED, EXTENDED RELEASE ORAL EVERY 12 HOURS
Qty: 30 TAB | Refills: 0 | Status: ON HOLD | OUTPATIENT
Start: 2017-04-20 | End: 2017-07-25

## 2017-04-20 RX ADMIN — HYDROMORPHONE HYDROCHLORIDE 0.5 MG: 1 INJECTION, SOLUTION INTRAMUSCULAR; INTRAVENOUS; SUBCUTANEOUS at 06:09

## 2017-04-20 RX ADMIN — LABETALOL HYDROCHLORIDE 500 MG: 100 TABLET, FILM COATED ORAL at 08:36

## 2017-04-20 RX ADMIN — TAMSULOSIN HYDROCHLORIDE 0.8 MG: 0.4 CAPSULE ORAL at 08:38

## 2017-04-20 RX ADMIN — ISOSORBIDE DINITRATE 10 MG: 10 TABLET ORAL at 16:16

## 2017-04-20 RX ADMIN — ACETAMINOPHEN 650 MG: 325 TABLET, FILM COATED ORAL at 06:07

## 2017-04-20 RX ADMIN — HYDROMORPHONE HYDROCHLORIDE 0.5 MG: 1 INJECTION, SOLUTION INTRAMUSCULAR; INTRAVENOUS; SUBCUTANEOUS at 12:06

## 2017-04-20 RX ADMIN — HYDRALAZINE HYDROCHLORIDE 25 MG: 25 TABLET, FILM COATED ORAL at 16:16

## 2017-04-20 RX ADMIN — ONDANSETRON 4 MG: 2 INJECTION, SOLUTION INTRAMUSCULAR; INTRAVENOUS at 14:48

## 2017-04-20 RX ADMIN — HYDRALAZINE HYDROCHLORIDE 25 MG: 25 TABLET, FILM COATED ORAL at 06:06

## 2017-04-20 RX ADMIN — ACETAMINOPHEN 650 MG: 325 TABLET, FILM COATED ORAL at 12:03

## 2017-04-20 RX ADMIN — CALCIUM ACETATE 1334 MG: 667 CAPSULE ORAL at 12:03

## 2017-04-20 RX ADMIN — AMLODIPINE BESYLATE 10 MG: 5 TABLET ORAL at 08:38

## 2017-04-20 RX ADMIN — ISOSORBIDE DINITRATE 10 MG: 10 TABLET ORAL at 08:38

## 2017-04-20 RX ADMIN — MORPHINE SULFATE 30 MG: 30 TABLET, EXTENDED RELEASE ORAL at 08:39

## 2017-04-20 RX ADMIN — LIDOCAINE 1 PATCH: 50 PATCH CUTANEOUS at 14:50

## 2017-04-20 RX ADMIN — CLOPIDOGREL BISULFATE 75 MG: 75 TABLET, FILM COATED ORAL at 08:38

## 2017-04-20 RX ADMIN — VANCOMYCIN HYDROCHLORIDE 1500 MG: 10 INJECTION, POWDER, LYOPHILIZED, FOR SOLUTION INTRAVENOUS at 12:09

## 2017-04-20 RX ADMIN — CALCIUM ACETATE 1334 MG: 667 CAPSULE ORAL at 08:35

## 2017-04-20 RX ADMIN — ALBUTEROL SULFATE 2.5 MG: 2.5 SOLUTION RESPIRATORY (INHALATION) at 14:32

## 2017-04-20 RX ADMIN — MINOXIDIL 2.5 MG: 2.5 TABLET ORAL at 08:38

## 2017-04-20 RX ADMIN — FUROSEMIDE 80 MG: 40 TABLET ORAL at 06:06

## 2017-04-20 RX ADMIN — LISINOPRIL 40 MG: 20 TABLET ORAL at 08:37

## 2017-04-20 ASSESSMENT — ENCOUNTER SYMPTOMS
COUGH: 0
VOMITING: 0
PND: 0
ORTHOPNEA: 0
MYALGIAS: 0
NAUSEA: 0
PALPITATIONS: 0
SHORTNESS OF BREATH: 0
FEVER: 0
NECK PAIN: 1
WEAKNESS: 1
WHEEZING: 0
DIARRHEA: 0
NEUROLOGICAL NEGATIVE: 1
CHILLS: 0
POLYDIPSIA: 0
HEADACHES: 0
BACK PAIN: 1
BRUISES/BLEEDS EASILY: 0

## 2017-04-20 ASSESSMENT — PAIN SCALES - GENERAL
PAINLEVEL_OUTOF10: 5
PAINLEVEL_OUTOF10: 7

## 2017-04-20 ASSESSMENT — LIFESTYLE VARIABLES: SUBSTANCE_ABUSE: 0

## 2017-04-20 NOTE — DOCUMENTATION QUERY
DOCUMENTATION QUERY    PROVIDERS: Please select “Cosign w/ note”to reply to query.    To better represent the severity of illness of your patient, please review the following information and exercise your independent professional judgment in responding to this query.     CHF with preserved EF is documented in the History and Physical. Based upon the clinical findings, risk factors, and treatment, can this diagnosis be further specified?    • Acute Diastolic heart failure   • Chronic Diastolic heart failure  • Acute on Chronic Diastolic heart failure  • CHF ruled out   • Other explanation of clinical findings  • Unable to determine      The medical record reflects the following:   Clinical Findings Per H&P:   PHYSICAL EXAMINATION:   PULMONARY:  Clear to auscultation bilaterally.  CARDIOVASCULAR:  Regular rate and rhythm with a II to III/VI systolic ejection murmur, no rubs or gallops appreciated.  ABDOMEN:  Positive bowel sounds, soft, nontender, nondistended.  EXTREMITIES:  No clubbing, cyanosis or edema.    6.  CHF with preserved ejection fraction:  We will provide cautious fluid management.    Per Nephrology Consultation:   IMPRESSION AND PLAN:  1.  End-stage renal disease, on hemodialysis.  The patient with azotemia, mild volume overload and mild hyperkalemia.  Plan, dialysis treatment today, post contrast exposure.    Results Review  BNP 1583  CXR: Stable marked cardiac silhouette enlargement without consolidation identified. Left subclavian stent.   ECHO 4/11/16: Moderate concentric left ventricular hypertrophy. EF 65%. Diastolic function is difficult to assess. Enlarged right atrium. Mildly dilated left atrium. Aortic sclerosis with mild stenosis. Moderate tricuspid regurgitation. RSVP 60 mmHg. Moderate pericardial effusion without hemodynamic compromise.    Treatment Lasix po, labetalol, lisinopril, CXR, & hemodialysis    Risk Factors Age, CHF, pericardial effusion, malignant HTN, ESRD, COPD, & sleep apnea    Location within medical record History & Physical, Progress Notes, Lab Results, & Radiology Results     Thank you,   Alejandra Bush RN  Clinical   Phone [phone]

## 2017-04-20 NOTE — PROGRESS NOTES
Bedside report [facility] from [facility] nurseNadeen RN. Assumed care. Pt resting in bed.

## 2017-04-20 NOTE — DISCHARGE INSTRUCTIONS
Discharge Instructions    Discharged to home by car with relative. Discharged via wheelchair, hospital escort: Yes.  Special equipment needed: Not Applicable    Be sure to schedule a follow-up appointment with your primary care doctor or any specialists as instructed.     Discharge Plan:   Diet Plan: Discussed  Activity Level: Discussed  Confirmed Follow up Appointment: Appointment Scheduled  Confirmed Symptoms Management: Discussed  Medication Reconciliation Updated: Yes  Influenza Vaccine Indication: Not indicated: Previously immunized this influenza season and > 8 years of age    I understand that a diet low in cholesterol, fat, and sodium is recommended for good health. Unless I have been given specific instructions below for another diet, I accept this instruction as my diet prescription.   Other diet: renal    Special Instructions: None    · Is patient discharged on Warfarin / Coumadin?   No     · Is patient Post Blood Transfusion?  No  Diskitis  Diskitis is irritation and swelling (inflammation) of the disks in the spine. Disks are soft structures that cushion the bones of the spine. This is not a common condition. Diskitis most often affects the disks of the lower back (lumbar disks) or upper back (thoracic disks).  CAUSES   A bacterial or viral infection can lead to diskitis. When diskitis develops, it is often accompanied by infection-induced inflammation of the bones (osteomyelitis) surrounding the spine. The condition can also be caused by inflammation from another condition, like an autoimmune disease. If you have an autoimmune disease, your body's defense system (immune system) mistakenly attacks your own healthy cells instead of germs and other things that can make you sick.  RISK FACTORS  People at higher risk of developing diskitis include:  · Children.  · Older persons.  · People with weak immune systems or immune system disorders.  · People with diabetes.  · People having chemotherapy.  SIGNS AND  SYMPTOMS   Back or stomach pain is the most common symptom of diskitis. Walking, standing, and sitting may be painful. Other symptoms may include:   · Trouble standing or rising from a sitting position.  · Fever lower than 102°F (38.9°C).  · Back stiffness.  · Flu-like symptoms, such as a sore throat and a runny nose.  · Irritability.  · Feeling pain when the affected area is touched.  DIAGNOSIS   Your health care provider can diagnose diskitis based on symptoms and medical history. Your health care provider will also do a physical exam. You may also need to have blood tests or imaging studies, such as:  · X-ray of the spine.  · MRI of the spine.  · A bone scan.  TREATMENT   Treatment for diskitis may include:  · Bed rest.  · Medicines, such as:  ¨ Antibiotics to treat a possible bacterial infection.  ¨ Anti-inflammatory medicines.  ¨ Steroids if the condition does not improve over time.  ¨ Pain-relieving medicines.  · A brace to stop your back from moving.  HOME CARE INSTRUCTIONS  · If you were prescribed an antibiotic medicine, finish it all even if you start to feel better.  · Take medicines only as directed by your health care provider.  · Keep all follow-up visits as directed by your health care provider. This is important.  SEEK MEDICAL CARE IF:   · You have difficulty walking or standing.  · You have persistent back pain.  · You are having side effects from medicines.     This information is not intended to replace advice given to you by your health care provider. Make sure you discuss any questions you have with your health care provider.     Document Released: 11/18/2005 Document Revised: 01/08/2016 Document Reviewed: 04/22/2015  [employer] Patient Education ©2016 [employer]  Morphine sustained-release tablets  What is this medicine?  MORPHINE (MOR feen) is a pain reliever. It is used to treat moderate to severe pain that lasts for more than a few days.  This medicine may be used for other  purposes; ask your health care provider or pharmacist if you have questions.  COMMON BRAND NAME(S): MS Clarke, Oramorph SR  What should I tell my health care provider before I take this medicine?  They need to know if you have any of these conditions:  -brain tumor  -drug abuse or addiction  -gallbladder disease  -head injury  -heart disease  -if you frequently drink alcohol-containing drinks  -intestinal disease  -kidney disease or problems urinating  -kyphoscoliosis  -liver disease  -lung disease, asthma, or breathing problems  -pancreatic disease  -seizures  -stomach or intestine problems  -taken isocarboxazid, phenelzine, tranylcypromine, or selegiline in the past 2 weeks  -thyroid disease  -an unusual or allergic reaction to lactose, morphine, other medicines, foods, dyes, or preservatives  -pregnant or trying to get pregnant  -breast-feeding  How should I use this medicine?  Take this medicine by mouth with a glass of water. Do not break, crush, or chew the medicine. Do not take a tablet that is not whole. A broken or crushed tablet can be very dangerous. You may get too much medicine. If the medicine upsets your stomach, take it with food or milk. Follow the directions on the prescription label. Take the medicine at the same time each day. Do not take more medicine than you are told to take.  A special MedGuide will be given to you by the pharmacist with each prescription and refill. Be sure to read this information carefully each time.  Talk to your pediatrician regarding the use of this medicine in children. Special care may be needed.  Overdosage: If you think you have taken too much of this medicine contact a poison control center or emergency room at once.  NOTE: This medicine is only for you. Do not share this medicine with others.  What if I miss a dose?  If you miss a dose, take it as soon as you can. If it is almost time for your next dose, take only that dose. Do not take double or extra  doses.  What may interact with this medicine?  Do not take this medicine with any of the following medications:  -MAOIs like Carbex, Eldepryl, Marplan, Nardil, and Parnate  This medicine may also interact with the following medications:  -alcohol  -antihistamines  -barbiturates, like phenobarbital  -medicines for depression, anxiety, or psychotic disturbances  -medicines for sleep  -muscle relaxants  -naltrexone, naloxone  -narcotic medicines (opiates) for pain  -rifampin  -tramadol  This list may not describe all possible interactions. Give your health care provider a list of all the medicines, herbs, non-prescription drugs, or dietary supplements you use. Also tell them if you smoke, drink alcohol, or use illegal drugs. Some items may interact with your medicine.  What should I watch for while using this medicine?  Tell your doctor or health care professional if your pain does not go away, if it gets worse, or if you have new or a different type of pain. You may develop tolerance to the medicine. Tolerance means that you will need a higher dose of the medicine for pain relief. Tolerance is normal and is expected if you take this medicine for a long time.  Do not suddenly stop taking your medicine because you may develop a severe reaction. Your body becomes used to the medicine. This does NOT mean you are addicted. Addiction is a behavior related to getting and using a drug for a non-medical reason. If you have pain, you have a medical reason to take pain medicine. Your doctor will tell you how much medicine to take. If your doctor wants you to stop the medicine, the dose will be slowly lowered over time to avoid any side effects.  You may get drowsy or dizzy when you first start taking the medicine or change doses. Do not drive, use machinery, or do anything that may be dangerous until you know how the medicine affects you. Stand or sit up slowly.  There are different types of narcotic medicines (opiates) for  pain. If you take more than one type at the same time, you may have more side effects. Give your health care provider a list of all medicines you use. Your doctor will tell you how much medicine to take. Do not take more medicine than directed. Call emergency for help if you have problems breathing.  This medicine will cause constipation. Try to have a bowel movement at least every 2 to 3 days. If you do not have a bowel movement for 3 days, call your doctor or health care professional.  Your mouth may get dry. Drinking water, chewing sugarless gum, or sucking on hard candy may help. See your dentist every 6 months.  What side effects may I notice from receiving this medicine?  Side effects that you should report to your doctor or health care professional as soon as possible:  -allergic reactions like skin rash, itching or hives, swelling of the face, lips, or tongue  -breathing problems  -change in the amount of urine  -confusion  -fast, irregular heartbeat  -fever, chills  -hallucinations  -feeling faint or lightheaded  -seizures  -slow or fast heartbeat  Side effects that usually do not require medical attention (report to your doctor or health care professional if they continue or are bothersome):  -constipation  -dizzy, drowsy  -headache  -nausea, vomiting  -pinpoint pupils  -sweating  This list may not describe all possible side effects. Call your doctor for medical advice about side effects. You may report side effects to FDA at 1-418-FDA-0287.  Where should I keep my medicine?  Keep out of the reach of children. This medicine can be abused. Keep your medicine in a safe place to protect it from theft. Do not share this medicine with anyone. Selling or giving away this medicine is dangerous and is against the law.  Store at room temperature between 15 and 30 degrees C (59 and 86 degrees F). Protect from light.  Discard unused medicine and used packaging carefully. Pets and children can be harmed if they find  used or lost packages. Flush any unused medicine down the toilet. Do not use the medicine after the expiration date. Follow the directions in the MedGuide.  NOTE: This sheet is a summary. It may not cover all possible information. If you have questions about this medicine, talk to your doctor, pharmacist, or health care provider.  © 2014, [employer]. (10/7/2013 5:22:00 PM)      Depression / Suicide Risk    As you are discharged from this [facility] facility, it is important to learn how to keep safe from harming yourself.    Recognize the warning signs:  · Abrupt changes in personality, positive or negative- including increase in energy   · Giving away possessions  · Change in eating patterns- significant weight changes-  positive or negative  · Change in sleeping patterns- unable to sleep or sleeping all the time   · Unwillingness or inability to communicate  · Depression  · Unusual sadness, discouragement and loneliness  · Talk of wanting to die  · Neglect of personal appearance   · Rebelliousness- reckless behavior  · Withdrawal from people/activities they love  · Confusion- inability to concentrate     If you or a loved one observes any of these behaviors or has concerns about self-harm, here's what you can do:  · Talk about it- your feelings and reasons for harming yourself  · Remove any means that you might use to hurt yourself (examples: pills, rope, extension cords, firearm)  · Get professional help from the community (Mental Health, Substance Abuse, psychological counseling)  · Do not be alone:Call your Safe Contact- someone whom you trust who will be there for you.  · Call your local CRISIS HOTLINE 368-9324 or 560-387-9297  · Call your local Children's Mobile Crisis Response Team [place] (649) 787-9886 or www.[employer]  · Call the toll free National Suicide Prevention Hotlines   · National Suicide Prevention Lifeline 238-598-XEVL (4789)  · National Hope Line Network 800-SUICIDE  (017-7823)

## 2017-04-20 NOTE — PROGRESS NOTES
Infectious Disease Progress Note    Author: Ara Whittaker M.D. Date of Service & Time created: 2017  11:06 AM    Chief Complaint:  Chief Complaint   Patient presents with   • Neck Pain   • Back Pain       Interval History:  76 year old male with esrd on d with neck pain and bacteremia  - still has significant pain. No fevers.   - no fevers. Wants to go home. Has significant pain in the neck.  - no fevers. The neck pain is better. Feels better  Labs Reviewed, Medications Reviewed and Radiology Reviewed.    Review of Systems:  Review of Systems   Constitutional: Positive for malaise/fatigue. Negative for fever.   Respiratory: Negative for cough and shortness of breath.    Cardiovascular: Negative for chest pain and leg swelling.   Gastrointestinal: Negative for nausea, vomiting and diarrhea.   Genitourinary: Negative for dysuria.   Musculoskeletal: Positive for neck pain.        Neck pain improved  Some stiffness   Neurological: Negative for headaches.       Hemodynamics:  Temp (24hrs), Av.5 °C (97.7 °F), Min:36.4 °C (97.5 °F), Max:36.7 °C (98 °F)  Temperature: 36.5 °C (97.7 °F)  Pulse  Av.3  Min: 58  Max: 87   Blood Pressure : (!) 165/56 mmHg       Physical Exam:  Physical Exam   Constitutional: He is oriented to person, place, and time. No distress.   HENT:   Mouth/Throat: No oropharyngeal exudate.   Eyes: No scleral icterus.   Neck: Neck supple.   Tenderness over the cervical spine   Cardiovascular: Regular rhythm.    No murmur heard.  Pulmonary/Chest: He has no wheezes. He has no rales.   Abdominal: Soft. There is no tenderness. There is no rebound.   Musculoskeletal: He exhibits no edema.   Neurological: He is alert and oriented to person, place, and time.   Vitals reviewed.      Meds:    Current facility-administered medications:   •  lidocaine (XYLOCAINE) 1%  injection, 1 mL, Other, DIALYSIS PRN, Malinda Key M.D., 1 mL at 17 4725  •  morphine ER (MS CONTIN) tablet 30 mg, 30  mg, Oral, Q12HRS, Mariaelena Millan M.D., 30 mg at 04/20/17 0839  •  lidocaine (LIDODERM) 5 % 1 Patch, 1 Patch, Transdermal, Q24HR, Mariaelena Millan M.D., 1 Patch at 04/19/17 1813  •  HYDROmorphone (DILAUDID) injection 0.5 mg, 0.5 mg, Intravenous, Q3HRS PRN, Mariaelena Millan M.D., 0.5 mg at 04/20/17 0609  •  heparin 1000 units/mL injection 1,500 Units, 1,500 Units, Intravenous, DIALYSIS PRN, Malinda Key M.D., 1,500 Units at 04/19/17 1400  •  clopidogrel (PLAVIX) tablet 75 mg, 75 mg, Oral, DAILY, Yandel Sutton D.O., 75 mg at 04/20/17 0838  •  hydrALAZINE (APRESOLINE) injection 10 mg, 10 mg, Intravenous, Q4HRS PRN, Yandel Sutton D.O., 10 mg at 04/19/17 0405  •  hydrALAZINE (APRESOLINE) tablet 25 mg, 25 mg, Oral, Q8HRS, Yandel Sutton D.O., 25 mg at 04/20/17 0606  •  albuterol (PROVENTIL) 2.5mg/0.5ml nebulizer solution 2.5 mg, 2.5 mg, Nebulization, Q4H PRN (RT), Aime Black M.D., 2.5 mg at 04/19/17 0944  •  cefTRIAXone (ROCEPHIN) 2 g in  mL IVPB, 2 g, Intravenous, Q24HRS, Aime Black M.D., Stopped at 04/19/17 2141  •  trazodone (DESYREL) tablet 150 mg, 150 mg, Oral, QHS, Aime Black M.D., 150 mg at 04/19/17 2109  •  pravastatin (PRAVACHOL) tablet 10 mg, 10 mg, Oral, Nightly, Aime Black M.D., 10 mg at 04/19/17 2110  •  minoxidil (LONITEN) tablet 2.5 mg, 2.5 mg, Oral, DAILY, Aime Black M.D., 2.5 mg at 04/20/17 0838  •  labetalol (NORMODYNE) tablet 500 mg, 500 mg, Oral, BID, Aime Black M.D., 500 mg at 04/20/17 0836  •  furosemide (LASIX) tablet 80 mg, 80 mg, Oral, BID, Aime Black M.D., 80 mg at 04/20/17 0606  •  amlodipine (NORVASC) tablet 10 mg, 10 mg, Oral, Q DAY, Aime Black M.D., 10 mg at 04/20/17 0838  •  multivitamin (THERAGRAN) tablet 1 Tab, 1 Tab, Oral, DAILY, Aime Black M.D., 1 Tab at 04/19/17 0909  •  calcium acetate (PHOS-LO) 667 MG capsule 1,334 mg, 1,334 mg, Oral, TID WITH MEALS, Aime Black M.D., 1,334 mg at 04/20/17 0835  •  epoetin hilario (EPOGEN,PROCRIT)  injection 2,000 Units, 2,000 Units, Subcutaneous, MO, WE + FR, Aime Black M.D., 2,000 Units at 04/19/17 1424  •  lisinopril (PRINIVIL) tablet 40 mg, 40 mg, Oral, BID, Aime Black M.D., 40 mg at 04/20/17 0837  •  omeprazole (PRILOSEC) capsule 40 mg, 40 mg, Oral, DAILY, Aime Black M.D., 20 mg at 04/17/17 0857  •  tamsulosin (FLOMAX) capsule 0.8 mg, 0.8 mg, Oral, AFTER BREAKFAST, Aime Black M.D., 0.8 mg at 04/20/17 0838  •  isosorbide dinitrate (ISORDIL) tablet 10 mg, 10 mg, Oral, TID, Aime Black M.D., 10 mg at 04/20/17 0838  •  senna-docusate (PERICOLACE or SENOKOT S) 8.6-50 MG per tablet 2 Tab, 2 Tab, Oral, BID, 2 Tab at 04/18/17 2021 **AND** polyethylene glycol/lytes (MIRALAX) PACKET 1 Packet, 1 Packet, Oral, QDAY PRN **AND** magnesium hydroxide (MILK OF MAGNESIA) suspension 30 mL, 30 mL, Oral, QDAY PRN **AND** bisacodyl (DULCOLAX) suppository 10 mg, 10 mg, Rectal, QDAY PRN, Aime Black M.D.  •  Respiratory Care per Protocol, , Nebulization, Continuous RT, Aime Black M.D.  •  heparin injection 5,000 Units, 5,000 Units, Subcutaneous, Q8HRS, Aime Black M.D., Stopped at 04/18/17 1400  •  ondansetron (ZOFRAN) syringe/vial injection 4 mg, 4 mg, Intravenous, Q4HRS PRN, Aime Black M.D.  •  ondansetron (ZOFRAN ODT) dispertab 4 mg, 4 mg, Oral, Q4HRS PRN, Aime Black M.D.  •  acetaminophen (TYLENOL) tablet 650 mg, 650 mg, Oral, Q6HRS, Aime Black M.D., 650 mg at 04/20/17 0607  •  hydrALAZINE (APRESOLINE) injection 10-20 mg, 10-20 mg, Intravenous, Q6HRS PRN, Aime Black M.D., 10 mg at 04/19/17 0627  •  oxycodone-acetaminophen (PERCOCET) 5-325 MG per tablet 1-2 Tab, 1-2 Tab, Oral, Q6HRS PRN, Aime Black M.D., 2 Tab at 04/19/17 0904    Labs:  Recent Labs      04/18/17   0734  04/19/17   0447  04/20/17   0419   WBC  4.5*  5.0  3.9*   RBC  2.90*  3.15*  2.92*   HEMOGLOBIN  8.9*  9.5*  8.8*   HEMATOCRIT  28.1*  30.0*  28.3*   MCV  96.9  95.2  96.9   MCH  30.7  30.2  30.1   RDW  52.5*  51.5*  50.7*    PLATELETCT  256  296  304   MPV  9.4  9.6  9.7   NEUTSPOLYS   --   70.40  57.70   LYMPHOCYTES   --   14.00*  17.50*   MONOCYTES   --   8.80  11.90   EOSINOPHILS   --   6.00  11.30*   BASOPHILS   --   0.60  1.30     Recent Labs      04/18/17   0734  04/19/17   0447  04/20/17   0419   SODIUM  136  140  138   POTASSIUM  4.8  5.4  4.7   CHLORIDE  97  99  95*   CO2  30  30  30   GLUCOSE  96  94  88   BUN  35*  36*  32*     Recent Labs      04/18/17   0734  04/19/17   0447  04/20/17   0419   CREATININE  6.50*  5.48*  4.87*       Imaging:  Ct-head W/o    4/16/2017 4/16/2017 11:13 PM HISTORY/REASON FOR EXAM:  Malignant hypertension, neck pain. TECHNIQUE/EXAM DESCRIPTION AND NUMBER OF VIEWS: CT of the head without contrast. The study was performed on a helical multidetector CT scanner. Contiguous 2.5 mm axial sections were obtained from the skull base through the vertex. Up to date radiation dose reduction adjustments have been utilized to meet ALARA standards for radiation dose reduction. COMPARISON:  None available FINDINGS: Lateral ventricles are normal in size and symmetric. Cortical sulci are moderately enlarged. Patchy areas of low attenuation in the white matter bilaterally. Focal low density in the LEFT basal ganglia consistent with prominent perivascular space. No significant mass effect or midline shift. Basal cisterns are patent. No evidence for intracranial hemorrhage. Calvaria are intact. Orbits show bilateral proptosis. Visualized mastoid air cells are clear. RIGHT maxillary sinus polyp or retention cyst. Calcifications of the carotid arteries noted.     4/16/2017  1.  Diffuse atrophy and white matter changes. 2.  No acute intracranial hemorrhage or territorial infarct.     Dx-chest-2 Views    3/21/2017  3/21/2017 3:00 PM HISTORY/REASON FOR EXAM:  Shortness of Breath Short of breath TECHNIQUE/EXAM DESCRIPTION AND NUMBER OF VIEWS: Two views of the chest. COMPARISON:  8/12/2016. FINDINGS: No pulmonary  infiltrates or consolidations are noted. No pleural effusions, no pneumothorax are appreciated. Stable enlarged cardiopericardial silhouette. Calcified aortic knob.     3/21/2017  1. No pulmonary infiltrates or consolidations are noted. 2. Stable cardiomegaly.    Dx-chest-portable (1 View)    4/15/2017  4/15/2017 4:24 PM HISTORY/REASON FOR EXAM: Chest Pain. Back and neck pain. Coronary artery disease TECHNIQUE/EXAM DESCRIPTION AND NUMBER OF VIEWS: Single AP view of the chest. COMPARISON: 3/21/2017 FINDINGS: Vascular stent is seen just inferior to the left clavicle Lungs: No consolidation detected. Pleura:  No pleural space process is seen. Heart and mediastinum: There is stable marked cardiac silhouette enlargement.     4/15/2017  Stable marked cardiac silhouette enlargement without consolidation identified Left subclavian stent    Mr-cervical Spine-with & W/o    4/18/2017 4/18/2017 8:06 AM HISTORY/REASON FOR EXAM:  Atraumatic Pain/Paresis. TECHNIQUE/EXAM DESCRIPTION: MRI of the cervical spine without and with contrast. The study was performed on a [employer] 1.5 Viviane MRI scanner. T1 sagittal, T2 fast spin-echo sagittal, T2 fat suppressed sagittal and gradient echo axial images were obtained of the cervical spine. Optional T2 axial images may also be obtained. T1 post-contrast fat suppressed sagittal images were obtained of the cervical spine. Optional T1 post-contrast fat-suppressed axial images may be obtained. 8 mL MultiHance contrast was administered intravenously. COMPARISON: C-spine series 7/14/2016 FINDINGS: There is marked irregularity of the endplates at the C6-7 level. There is subtle increased T2 signal intensity in the C6-7 disc space. Additionally there is diffuse decreased T1 and increased T2 signal intensity throughout the C6 and superior aspect of the C7 vertebral bodies. There is also slight enhancement in this region as well. There is moderate disc space narrowing at the C5-6 level. There are  bandlike regions of increased T1 and T2 signal intensity about the adjacent endplates. Additionally there is linear increased T2 signal intensity in the prevertebral soft tissues from the C2 level to the C6 level. There is mild disc space narrowing at the C3-4 and C7-T1 levels There is 3 mm of anterolisthesis at this C7-T1 level. There are mild anterior osteophytic changes at the C5-6 and C6-7 levels. There are no anomalies at the craniovertebral junction. The cervical spinal cord is normal in caliber and signal throughout its course. There is no abnormal cord enhancement. There is no abnormal intradural extramedullary enhancement. Level specific findings: C2-3 level normal. C3-4 level mild to moderate broad-based posterior spurring which flattens the ventral surface of the cord and contributes to a mild central canal stenosis. Severe left-sided neural foraminal stenosis and mild to moderate right-sided neural foraminal stenosis. C4-5 level mild posterior spurring. Borderline central canal stenosis. Moderate left-sided neural foraminal stenosis and mild to moderate right-sided neural foraminal stenosis. C5-6 level moderate broad-based posterior spurring which flattens the  ventral surface of the cord and causes a mild to moderate central canal stenosis. Severe right-sided neural foraminal stenosis and mild to moderate left-sided neural foraminal stenosis. C6-7 level mild broad-based posterior spurring which abuts the ventral surface of the cord and causes a mild central canal stenosis. Mild to moderate bilateral neural foraminal stenosis. C7-T1 level minimal posterior spurring. Mild bilateral neural foraminal stenosis.     4/18/2017  1.  Evidence of subacute discitis/osteomyelitis at the C6-7 level. 2.  Mild degenerative anterolisthesis at C7-T1 level. 3.  Prevertebral inflammatory/infectious changes from the C2 level to the C6 level. 4.  Mild to moderate central canal stenosis at the C5-6 level with mild central  canal stenosis at the C3-4 and C6-7 levels. 5.  Moderate cervical spondylotic change at the C5-6 level which flattens the ventral surface of the cord. Mild cervical spondylotic changes at the C3-4, C4-5, and C6-7 levels. 6.  Moderate to severe multilevel neural foraminal stenosis.    Echocardiogram Comp W/o Cont    2017  Transthoracic Echo Report Echocardiography Laboratory CONCLUSIONS Prior echo 16 Normal left ventricular chamber size. Moderate concentric left ventricular hypertrophy. Normal left ventricular systolic function. Left ventricular ejection fraction is visually estimated to be 65%. Normal regional wall motion. Diastolic function is difficult to assess. Enlarged right atrium. Dilated inferior vena cava without inspiratory collapse. Mildly dilated left atrium. Structurally normal aortic valve. Aortic sclerosis with mild stenosis. Vmax is 2.6  m/s. Transvalvular gradients are - Peak: 27 mmHg, Mean: 17 mmHg. Aortic valve area calculated from the continuity equation is 2.1 sq cm. No aortic insufficiency. Moderate tricuspid regurgitation. Estimated right ventricular systolic pressure  is 60 mmHg. Compared to the report of the study done - there has been.  Moderate pericardial effusion without evidence of hemodynamic compromise. Compared to the report of the study done - there has been and increase in the pericardial effusion and slight increase in the estimated RVSP. Known dialysis patient. EDVIN GREEN Exam Date:         2017                    16:23 Exam Location:     Out Patient Priority:          Routine Ordering Physician:        SHELIA WALL Referring Physician:       [id number] Castaneda Sonographer:               Mandy Palm Age:    76     Gender:    M MRN:    [id number] :    1940 BSA:    1.85   Ht (in):    67     Wt (lb):    162 Exam Type:     Complete Indications:     Dyspnea ICD Codes:       786.09 CPT Codes:       10065 BP:          /          HR: Technical Quality:        Fair MEASUREMENTS  (Male / Female) Normal Values 2D ECHO LV Diastolic Diameter PLAX        4.7 cm                4.2 - 5.9 / 3.9 - 5.3 cm LV Systolic Diameter PLAX         3.5 cm                2.1 - 4.0 cm IVS Diastolic Thickness           1.7 cm                LVPW Diastolic Thickness          1.4 cm                RV Diameter 4C                    3 cm                  2.5 - 2.9 cm LVOT Diameter                     2.2 cm                RA Diameter                       4.6 cm                Estimated LV Ejection Fraction    65 %                  LV Ejection Fraction MOD BP       61.7 %                >= 55  % LV Ejection Fraction MOD 4C       60 %                  LV Ejection Fraction MOD 2C       69.4 %                LA Volume Index                   39.2 cm³/m²           16 - 28 cm³/m² IVC Diameter                      2.6 cm                M-MODE Aortic Root Diameter MM           3 cm                  DOPPLER AV Peak Velocity                  2.4 m/s               AV Peak Gradient                  23.8 mmHg             AV Mean Gradient                  14.2 mmHg             LVOT Peak Velocity                1.2 m/s               AV Area Cont Eq vti               2.1 cm²               Mitral E Point Velocity           0.93 m/s              Mitral E to A Ratio               0.99                  Mitral A Duration                 141 ms                MV Pressure Half Time             59.7 ms               MV Area PHT                       3.7 cm²               MV Deceleration Time              206 ms                MR Flow Convergence Radius        0.52 cm               TV Peak E Velocity                0.52 m/s              TR Peak Velocity                  319 cm/s              PV Peak Velocity                  2 m/s                 PV Peak Gradient                  16 mmHg               RVOT Peak Velocity                0.77 m/s              MV E' Velocity                    8.1 cm/s              *  Indicates values subject to auto-interpretation LV EF:  65    % FINDINGS Left Ventricle Normal left ventricular chamber size. Moderate concentric left ventricular hypertrophy. Normal left ventricular systolic function. Left ventricular ejection fraction is visually estimated to be 65%. Normal regional wall motion. Diastolic function is difficult to assess. Right Ventricle The right ventricle was normal in size and function. Right Atrium Enlarged right atrium. Dilated inferior vena cava without inspiratory collapse. Left Atrium Mildly dilated left atrium. Left atrial volume index is 39  mL/sq m. Mitral Valve Structurally normal mitral valve. Mitral annular calcification. No mitral stenosis. Trace mitral regurgitation. Aortic Valve Structurally normal aortic valve. Aortic sclerosis with mild stenosis. Vmax is 2.6  m/s. Transvalvular gradients are - Peak: 27 mmHg, Mean: 17 mmHg. Aortic valve area calculated from the continuity equation is 2.1 sq cm. No aortic insufficiency. Tricuspid Valve Structurally normal tricuspid valve without significant stenosis. Moderate tricuspid regurgitation. Estimated right ventricular systolic pressure  is 60 mmHg. Pulmonic Valve The pulmonic valve is not well visualized. No stenosis or regurgitation seen. Pericardium Normal pericardium with effusion. Largest measurement 3 cm. Moderate pericardial effusion without evidence of hemodynamic compromise. Aorta The aortic root is normal. James Mendoza M.D. (Electronically Signed) Final Date:     11 April 2017                 17:23    Ct-cta Complete Thoracoabdominal Aorta    4/15/2017  4/15/2017 5:31 PM HISTORY/REASON FOR EXAM:  Chest Pain TECHNIQUE/EXAM DESCRIPTION:  CT angiogram of the chest and abdomen with and without reconstructions. Initial precontrast images were obtained from the lung apices through the diaphragmatic domes. Following this, 100 mL of Omnipaque 350 nonionic contrast was administered at 5 mL/sec and helical scanning  was obtained from the lung apices thru the iliac crest and bifurcation. Thick and thin section multiplanar volume reformats were generated from the axial data set in the sagittal and coronal planes. 3D angiographic images were reviewed on PACS. Maximum intensity projection (MIP) images were generated and reviewed. Low dose optimization technique was utilized for this CT exam including automated exposure control and adjustment of the mA and/or kV according to patient size. COMPARISON:  August 12, 2016 FINDINGS: Noncontrast images: There is no intramural hematoma. There is calcified plaque in the aorta. Coronary artery calcifications are identified. There is a moderate sized pericardial effusion. Contrast images: Aorta and vasculature: No evidence of thoracic aortic aneurysm or dissection. Images of arch are somewhat degraded by motion artifact. There is coarse calcified plaque in the abdominal aorta without aneurysm. There is no mediastinal or hilar adenopathy. There is trace pleural effusions. There is dependent atelectasis. There is mild diffuse groundglass consolidation suggesting mild edema. There is again hyperenhancement in the left hepatic lobe is nonspecific. Hepatic cysts are once again noted in the right hepatic lobe. Spleen is normal in size. Pancreas is unremarkable. There is again gallbladder wall thickening likely related to congestive heart failure. There is again diffuse enlargement of the adrenal gland suggesting hyperplasia. Kidneys enhance symmetrically. There is bilateral renal cortical thinning with multiple bilateral renal cysts. Hyperdense cyst once again noted arising from the lower pole of the right kidney. The bowel is nondilated. There is trace ascites.     4/15/2017  1.  Aortic atherosclerosis without evidence of dissection or aneurysm. 2.  Moderate pericardial effusion with pulmonary edema and trace pleural effusions. 3.  Persistent ill-defined hyperenhancement within the medial segment  "of the left hepatic lobe could related to shunting though hypervascular lesion is difficult to exclude. Recommend follow-up liver CT or MRI. 4.  Bilateral renal cortical thinning with multiple cysts.       Micro:  Results     Procedure Component Value Units Date/Time    BLOOD CULTURE [[id number]]  (Abnormal) Collected:  04/15/17 2050    Order Status:  Completed Specimen Information:  Blood from Peripheral Updated:  04/18/17 1301     Significant Indicator POS (POS)      Source BLD      Site PERIPHERAL      Blood Culture -- (A)      Result:        Blood culture testing and Gram stain, if indicated, are  performed at [facility], [facility]  [facility], [place].  Positive blood cultures are  sent to [facility], [address] [place] [address], for organism identification and  susceptibility testing.  Growth detected by Bactec instrument.       Blood Culture -- (A)      Result:        Viridans Streptococcus  See previous culture for sensitivity report.      Narrative:      CALL  [facility]  MED tel. [phone],  CALLED  MED tel. [phone] 04/16/2017, 11:28, RB PERF. RESULTS CALLED  TO:[id number],rn  Per Hospital Policy: Only change Specimen Src: to \"Line\" if  specified by physician order.    BLOOD CULTURE [[id number]]  (Abnormal)  (Susceptibility) Collected:  04/15/17 2055    Order Status:  Completed Specimen Information:  Blood from Peripheral Updated:  04/18/17 1301     Significant Indicator POS (POS)      Source BLD      Site PERIPHERAL      Blood Culture -- (A)      Result:        Blood culture testing and Gram stain, if indicated, are  performed at [facility], [facility]  [facility].[place], [place].  Positive blood cultures are  sent to [facility], [address] [place] [address], for organism identification and  susceptibility testing.  Growth detected by Bactec instrument.       Blood Culture Viridans Streptococcus (A)     Narrative:  " "    CALL  [facility]  MED tel. [phone],  CALLED  MED tel. [phone] 04/16/2017, 10:13, RB PERF. RESULTS CALLED  TO:[phone],rn  Per Hospital Policy: Only change Specimen Src: to \"Line\" if  specified by physician order.    Culture & Susceptibility     VIRIDANS STREPTOCOCCUS     Antibiotic Sensitivity Microscan Unit Status    Cefotaxime Sensitive 0.064 mcg/mL Final    Penicillin Sensitive 0.125 mcg/mL Final                       BLOOD CULTURE [[id number]] Collected:  04/17/17 0754    Order Status:  Completed Specimen Information:  Blood from Peripheral Updated:  04/18/17 0936     Significant Indicator NEG      Source BLD      Site PERIPHERAL      Blood Culture --      Result:        No Growth    Note: Blood cultures are incubated for 5 days and  are monitored continuously.Positive blood cultures  are called to the RN and reported as soon as  they are identified.      Narrative:      Per Hospital Policy: Only change Specimen Src: to \"Line\" if  specified by physician order.    BLOOD CULTURE [[id number]] Collected:  04/17/17 0759    Order Status:  Completed Specimen Information:  Blood from Peripheral Updated:  04/18/17 0936     Significant Indicator NEG      Source BLD      Site PERIPHERAL      Blood Culture --      Result:        No Growth    Note: Blood cultures are incubated for 5 days and  are monitored continuously.Positive blood cultures  are called to the RN and reported as soon as  they are identified.      Narrative:      Per Hospital Policy: Only change Specimen Src: to \"Line\" if  specified by physician order.           Assessment:  Active Hospital Problems    Diagnosis   • Dyslipidemia [E78.5]   • Bacteremia due to Gram-positive bacteria [A49.9]   • Acute neck pain [M54.2]   • HTN (hypertension), malignant [I10]   • Anemia in CKD (chronic kidney disease) [N18.9, D63.1]   • End stage renal disease (HCC) [N18.6]   • BPH (benign prostatic hyperplasia) [N40.0]       Plan:  Strep viridans bacteremia  D/c vanco  Continue " rocephin while in the hospital  Blood c/s are neg from 4/17 so far  C6-7 om/ diskitis  Will give vanco during HD  Give one dose prior to discharge  Aim for 6-8  Weeks of abx  at least. Will ask for vanco during Hd thro 6/12  Needs outpt consult with neurosurgery asap    Prevertebral phlegmon  Continue to monitor  Will see neurosurgery as an outpt    Pericardial effusion  Seen by cards  Do not think its infected    esrd on Hd  Renal to arrange for outpt vanco during HD  Will  follow him as a outpt  Will need repeat mri  Discussed with internal Medicine

## 2017-04-20 NOTE — PROGRESS NOTES
Cardiology Progress Note               Author: Antolin Fitzpatrick Date & Time created: 2017  4:08 PM     Interval History:  Being seen by cardiology for follow up and evaluation of pericardial effusion.  He's had no symptoms of tamponade or effusive constrictive physiology. Blood pressure is remained labile and is being followed by nephrology in addition to his hemodialysis. His energy level is been better and he has generally felt good over the past 24 hours.    Review of Systems   Constitutional: Negative for fever and chills.   Respiratory: Negative for cough and wheezing.    Cardiovascular: Negative for chest pain, palpitations, orthopnea, leg swelling and PND.   Musculoskeletal: Negative for myalgias.   Neurological: Negative.    Endo/Heme/Allergies: Negative for polydipsia. Does not bruise/bleed easily.   Psychiatric/Behavioral: Negative for substance abuse.       Physical Exam   Constitutional: He is oriented to person, place, and time. He appears well-developed and well-nourished.   Eyes: No scleral icterus.   Neck: No JVD (normal waveform) present.   Cardiovascular: Normal rate, regular rhythm, S1 normal and S2 normal.  Exam reveals friction rub (triphasic friction rub at the base and apex in addition to a softer systolic ejection murmur along the sternal borders). Exam reveals no gallop.    Murmur heard.  Abdominal: He exhibits no distension.   Musculoskeletal: He exhibits no edema.   Neurological: He is alert and oriented to person, place, and time.   Skin: Skin is warm and dry.   Psychiatric: He has a normal mood and affect. Thought content normal.       Hemodynamics:  Temp (24hrs), Av.5 °C (97.7 °F), Min:36.3 °C (97.4 °F), Max:36.7 °C (98 °F)  Temperature: 36.3 °C (97.4 °F)  Pulse  Av.1  Min: 58  Max: 88   Blood Pressure : (!) 172/72 mmHg     Respiratory:    Respiration: 20, Pulse Oximetry: 89 %, O2 Daily Delivery Respiratory : Room Air with O2 Available     Given By:: Mouthpiece, Work Of  Breathing / Effort: Mild  RUL Breath Sounds: Clear, RML Breath Sounds: Diminished, RLL Breath Sounds: Diminished, SILVANA Breath Sounds: Clear, LLL Breath Sounds: Diminished  Fluids:        GI/Nutrition:  Orders Placed This Encounter   Procedures   • Diet Order     Standing Status: Standing      Number of Occurrences: 1      Standing Expiration Date:      Order Specific Question:  Diet:     Answer:  Renal [8]     Lab Results:  Recent Labs      04/18/17   0734  04/19/17 0447 04/20/17 0419   WBC  4.5*  5.0  3.9*   RBC  2.90*  3.15*  2.92*   HEMOGLOBIN  8.9*  9.5*  8.8*   HEMATOCRIT  28.1*  30.0*  28.3*   MCV  96.9  95.2  96.9   MCH  30.7  30.2  30.1   MCHC  31.7*  31.7*  31.1*   RDW  52.5*  51.5*  50.7*   PLATELETCT  256  296  304   MPV  9.4  9.6  9.7     Recent Labs      04/18/17 0734  04/19/17 0447 04/20/17 0419   SODIUM  136  140  138   POTASSIUM  4.8  5.4  4.7   CHLORIDE  97  99  95*   CO2  30  30  30   GLUCOSE  96  94  88   BUN  35*  36*  32*   CREATININE  6.50*  5.48*  4.87*   CALCIUM  9.3  9.9  9.7                         Medical Decision Making, by Problem:  Active Hospital Problems    Diagnosis   • Pericardial effusion [I31.3]   • Dyslipidemia [E78.5]   • Bacteremia due to Gram-positive bacteria [A49.9]   • Acute neck pain [M54.2]   • HTN (hypertension), malignant [I10]   • Anemia in CKD (chronic kidney disease) [N18.9, D63.1]   • End stage renal disease (HCC) [N18.6]   • BPH (benign prostatic hyperplasia) [N40.0]     The pericardial effusion has been chronic although is slightly greater now. It is presumably due to his renal failure.  So far no clinical evidence of tamponade or effusive-constrictive physiology.    Plan:  Continue to monitor and observe the pericardial effusion as suggested by Dr. Lopez.  Dialysis is planned again tomorrow.    Core Measures

## 2017-04-20 NOTE — PROGRESS NOTES
Updates received from Annie BYRD. Pt resting in bed with no needs at this time. Fall precautions in place.

## 2017-04-20 NOTE — DISCHARGE PLANNING
Medical Social Work    Referral: Pt discussed at IDT rounds this AM.    Intervention: Per flowsheet, pt lives with spouse and came from [facility]. PT eval safe for home.  Discussion that pt will need IV infusion.  SW will follow up if ID gives order for OP infusion.      Plan: SW reviewed Dr. Whittaker's note which show Rocephin discontinue after d.c and Vanco is being arranged through [facility] during HD.  SW called bs RN Noreen to clarify but no answer.

## 2017-04-20 NOTE — CARE PLAN
Problem: Pain Management  Goal: Pain level will decrease to patient’s comfort goal  Outcome: PROGRESSING AS EXPECTED  Pt encouraged to verbalize experiencing pain. 0-10 pain scale explained, verbalized understanding. Educated re: non-pharm methods of pain relief. Administer pain meds as ordered.    Problem: Psychosocial Needs:  Goal: Level of anxiety will decrease  Outcome: PROGRESSING AS EXPECTED  One-on-one discussion. Identify triggers for anxiety. Encourage pt to verbalize feelings of anxiety. Educated and encouraged re: non-pharm methods of anxiety reduction. Administer medications prn as ordered.

## 2017-04-20 NOTE — PROGRESS NOTES
Hospital Medicine Progress Note, Adult, Complex               Author: Long Larson Date & Time created: 2017  3:30 PM     Interval History:  76 year old with ESRD, came in with back/neck pain and found to have bacteremia. MRI with osteomyelitis/diskitis as well as prevertebral infectious changes. On Abx.    States that his neck pain is improved. Getting ABx. Vancomycin has been set up in dialysis until     Review of Systems:  Review of Systems   Constitutional: Positive for malaise/fatigue.   Musculoskeletal: Positive for back pain and neck pain.   Neurological: Positive for weakness.       Physical Exam:  Physical Exam   Constitutional: He is oriented to person, place, and time. He appears well-developed and well-nourished.   Cardiovascular: Normal rate and regular rhythm.    Musculoskeletal: He exhibits no edema or tenderness.   Neurological: He is alert and oriented to person, place, and time.   Skin: Skin is warm and dry.       Labs:        Invalid input(s): [id number]      Recent Labs      17   0734  17   SODIUM  136  140  138   POTASSIUM  4.8  5.4  4.7   CHLORIDE  97  99  95*   CO2  30  30  30   BUN  35*  36*  32*   CREATININE  6.50*  5.48*  4.87*   CALCIUM  9.3  9.9  9.7     Recent Labs      17   0734  177  17   0419   GLUCOSE  96  94  88     Recent Labs      17   0734  177  179   RBC  2.90*  3.15*  2.92*   HEMOGLOBIN  8.9*  9.5*  8.8*   HEMATOCRIT  28.1*  30.0*  28.3*   PLATELETCT  256  296  304     Recent Labs      17   0734  177  17   WBC  4.5*  5.0  3.9*   NEUTSPOLYS   --   70.40  57.70   LYMPHOCYTES   --   14.00*  17.50*   MONOCYTES   --   8.80  11.90   EOSINOPHILS   --   6.00  11.30*   BASOPHILS   --   0.60  1.30           Hemodynamics:  Temp (24hrs), Av.5 °C (97.7 °F), Min:36.3 °C (97.4 °F), Max:36.7 °C (98 °F)  Temperature: 36.3 °C (97.4 °F)  Pulse  Av.1   Min: 58  Max: 88   Blood Pressure : (!) 172/72 mmHg     Respiratory:    Respiration: 20, Pulse Oximetry: 89 %, O2 Daily Delivery Respiratory : Room Air with O2 Available     Given By:: Mouthpiece, Work Of Breathing / Effort: Mild  RUL Breath Sounds: Clear, RML Breath Sounds: Diminished, RLL Breath Sounds: Diminished, SILVANA Breath Sounds: Clear, LLL Breath Sounds: Diminished  Fluids:    Intake/Output Summary (Last 24 hours) at 04/20/17 1530  Last data filed at 04/19/17 2107   Gross per 24 hour   Intake    500 ml   Output   2500 ml   Net  -2000 ml        GI/Nutrition:  Orders Placed This Encounter   Procedures   • Diet Order     Standing Status: Standing      Number of Occurrences: 1      Standing Expiration Date:      Order Specific Question:  Diet:     Answer:  Renal [8]     Medical Decision Making, by Problem:  Active Hospital Problems    Diagnosis   • Dyslipidemia [E78.5]   • Bacteremia due to Gram-positive bacteria [A49.9]   • Acute neck pain [M54.2]   • HTN (hypertension), malignant [I10]   • Anemia in CKD (chronic kidney disease) [N18.9, D63.1]   • End stage renal disease (HCC) [N18.6]   • BPH (benign prostatic hyperplasia) [N40.0]     1. ESRD - HD due tomorrow  2. HTN -  BP is elevated, on multiple medications.  3. Bacteremia    -Antibiotics to be continued as an outpatient once discharged  -Will follow  -d/w patient   Core Measures

## 2017-04-20 NOTE — PROGRESS NOTES
"Pt refused PUF treatment today. Pt verbalized \" was dialyze 3 days in a row and I don't have swelling nor shortness of breath\".  Dr Larson notified and ordered to cancel PUF treatment per pt's request.  "

## 2017-04-20 NOTE — PROGRESS NOTES
2100: Pt assessed and medicated per MD order. Pt reporting pain as 7/10. Pt remains hypertensive. Events during the day discussed with no questions or concerns at this time. Fall precautions in place. All needs at this time met.   0045: Pt BP rechecked and improved. Pt refusing tylenol and states his pain is tolerable right now. Pt back to sleep immediately.   0200: Vital signs assessed. Pt has no further needs at this  time.   0400: Pt sleeping with no s/s of distress.   0600: Pt medicated per MD order. Pt also medicated for pain. No further needs at this time.

## 2017-04-20 NOTE — CARE PLAN
Problem: Safety  Goal: Will remain free from falls  High fall risk precautions in place. Pt encouraged to use call light before getting out of bed. Pt verbalized understanding and able to return demonstrate how to use call light.     Problem: Pain Management  Goal: Pain level will decrease to patient’s comfort goal  0-10 pain scale used to assess and monitor pain. Pt medicated accordingly with a goal to make pt comfortable at rest and with movement.

## 2017-04-20 NOTE — DOCUMENTATION QUERY
DOCUMENTATION QUERY    PROVIDERS: Please select “Cosign w/ note”to reply to query.    To better represent the severity of illness of your patient, please review the following information and exercise your independent professional judgment in responding to this query.     O2 dependence is documented in the History and Physical. Based upon the clinical findings, risk factors, and treatment, can a diagnosis be provided to support this finding?    • Chronic Respiratory Failure  • Chronic Hypoxemia  • Other explanation of clinical findings  • Unable to determine    The medical record reflects the following:   Clinical Findings Per H&P:   4.  COPD with 2 liter O2 dependence:  Provide supplementary oxygen, respiratory protocol, incentive spirometry, Pneumovax and Fluvax as appropriate.   Treatment Supplemental O2, Proventil Nebs PRN, & RT protocol   Risk Factors Age, COPD, HTN, CHF, pericardial effusion, sleep apnea, & ESRD   Location within medical record History & Physical & Progress Notes     Thank you,   Alejandra Bush RN  Clinical   Phone [phone]

## 2017-04-20 NOTE — DISCHARGE SUMMARY
DATE OF ADMISSION:  04/15/2017    DATE OF DISCHARGE:  04/20/2017    HISTORY OF PRESENT ILLNESS:  This is a 76-year-old male who come to the   emergency room with a complaint of neck pain.  Patient denied any trauma to   the neck.  It apparently started 2 days prior to coming to the emergency room.    Initially, patient had gone to the urgent care, was noted to have   temperature of 100.2.  Patient was admitted to hospital for further   management.    HOSPITAL PRESENTATION:  During the hospital stay, patient had radiological   studies.  CTA was done initially, impression shows aortic atherosclerosis   without evidence of dissection or aneurysm, moderate pericardial effusion with   pulmonary edema, and trace pleural effusions with persisting ill-defined   hyperenhancement in the medial segment of the left hepatic lobe could related   to shunting through hypervascular lesion, is difficult to exclude, recommend   follow up with liver, CT or MRI, bilateral renal cortical thinning with   multiple cysts.  Chest x-ray was done and neuro consultation was noted to   have.  Left subclavian stent was noted.  CT of the head shows diffuse atrophy   and white matter changes, no acute intracranial hemorrhage or territorial   infarct.  A cervical MRI was done, impression is evidence of subacute   diskitis/osteomyelitis at the C6-C7 level, mild degenerative anterolisthesis   at C7-T1 level; _____/infectious changes from the C2 level to the C6 level and   mild-to-moderate central canal stenosis at the C5-C6 level with mild central   canal stenosis at the C3-C4 and C6-C7 level.  Moderate cervical spondylotic   change at the C5-C6 level, which flattens the vertebral surface of the cord,   mild cervical spondylotic changes at the C3-C4, C4-C5, C6-C7 level, moderate   to severe multilevel neural foraminal stenosis.  ID consultation was placed   for the patient and patient was on vancomycin and also was on Rocephin.  Also,   blood cultures  were done negative x2 so far, but on 04/15/2017, they came   back positive, as of 04/17/2017, they are negative.  The positive blood   culture showed Streptococcus viridans.  I spoke to the ID physician, Rocephin   is sufficient until today and no recommendation to continue Rocephin.    However, vancomycin to be done with dialysis.  The last day of the vancomycin   would be 06/12/2017.  Patient also was noted to have pericardial effusion,   which is moderate, cardiology consultation was done, no intervention   necessary, this is chronic as per cardiologist report.  Also, followup MRI   would be done for the patient in 1-2 weeks for the shunting _____   hypervascular lesion, which is difficult to exclude.  Patient also received   dialysis during the hospital stay for his chronic kidney failure.    TODAY' PHYSICAL EXAMINATION:  VITAL SIGNS:  Temperature of 36.5, pulse of 80, respiratory rate of 18, blood   pressure 165/56, O2 saturation 92% on room air.  GENERAL APPEARANCE:  Patient is awake, alert, oriented x3, in no acute   distress.  HEENT AND NECK:  Neck is supple.  There is some tenderness noted in the   cervical area with palpation noted.  CARDIOVASCULAR:  S1, S2 regular.  LUNGS:  Decreased breath sounds, otherwise clear.  ABDOMEN:  Soft, nontender.  EXTREMITIES:  Lower extremity, no edema or rash noted.    LABORATORY DATA:  WBC of 3.9, H and H 8.8 and 28.3, platelets of 304,000.    Sodium is 138, potassium 4.7, chloride 95, bicarbonate 30, BUN of 32,   creatinine of 4.87.    ASSESSMENT AND PLAN:  This is a 76-year-old male with diskitis/osteomyelitis.  1.  Vancomycin to be given at the patient's dialysis, 06/12/2017.  2.  ID input is noted.  No Rocephin is recommended.  4.  Also, neurosurgery consultation needs to be done as an outpatient in 1-2   weeks from today.    For pain, patient will be on MS Contin 30 mg p.o. b.i.d. and also placed the   patient also on oxycodone p.r.n.    For hypertension, continue  with the home medications.    For the abnormal CAT scan findings, we will do an MRI of the abdomen within   the next 1-2 weeks.    FOLLOWUP:  Patient is to follow up in 1 week.       ____________________________________     MD CYNDI Sandoval / DELANO    DD:  04/20/2017 11:22:12  DT:  04/20/2017 12:09:42    D#:  [id number]  Job#:  [id number]    cc: _____ _____

## 2017-04-20 NOTE — PROGRESS NOTES
"Assessment completed--see doc flowsheet. A&Ox4. Neuro checks per doc flowsheet. Pt reports neck pain \"always there\"; intensifies w/movement. Anxious for discharge. Meds provided. POC discussed, verbalized understanding. Call light and personal possessions within reach, bed in low position, encouraged to call for assistance.  "

## 2017-04-21 ENCOUNTER — TELEPHONE (OUTPATIENT)
Dept: INFECTIOUS DISEASES | Facility: MEDICAL CENTER | Age: 77
End: 2017-04-21

## 2017-04-21 NOTE — DOCUMENTATION QUERY
DOCUMENTATION QUERY    PROVIDERS: Please select “Cosign w/ note”to reply to query.    Dear Dr. Millan,     Thank you for answering the query so promptly. Your response covered chronicity, however, the underlying condition was not addressed. Please review this documentation query and reply with an addendum/note.     To better represent the severity of illness of your patient, please review the following information and exercise your independent professional judgment in responding to this query.     O2 dependence is documented in the History and Physical. Based upon the clinical findings, risk factors, and treatment, can a diagnosis be provided to support this finding?    • Chronic Respiratory Failure  • Chronic Hypoxemia  • Other explanation of clinical findings  • Unable to determine    The medical record reflects the following:   Clinical Findings Per H&P:   4.  COPD with 2 liter O2 dependence:  Provide supplementary oxygen, respiratory protocol, incentive spirometry, Pneumovax and Fluvax as appropriate.   Treatment Supplemental O2, Proventil Nebs PRN, & RT protocol   Risk Factors Age, COPD, HTN, CHF, pericardial effusion, sleep apnea, & ESRD   Location within medical record History & Physical & Progress Notes     Thank you,   Alejandra Bush RN  Clinical   Phone [phone]

## 2017-04-21 NOTE — PROGRESS NOTES
Discharge instructions and prescriptions explained & provided to patient. Verbalized understanding. IV removed, tip intact. Pt discharged to home with all personal belongings via wheelchair.

## 2017-04-21 NOTE — TELEPHONE ENCOUNTER
Contacted to schedule hospital f/v. Pt is questioning need for follow up.  Explained to him our doctors are the ones managing the abx and they want to make he is tolerating the abx as well as keeping an eye on the infection he is being treated for.  Also confused about how the abx will be administered.  Per notes [facility] was to arrange this for the pt.  When approved the abx would be administered with dialysis.   This was communicated to the patient who said will ask the nurses at dialysis today to make sure the abx are being given as indicated.   At the time of call the patient didn't have his calender with him and said was going to call back to make an appt.

## 2017-04-21 NOTE — DISCHARGE PLANNING
4/21/17 @ 0734 - SW Antonieta retrieved a voicemail from ROCHELLE Sorto stating pt is d.c and has a RX for vanco that he is supposed to receive when he gets dialysis.  ROCHELLE Sorto requested JAMES follow up.  JAMES called Bernie [phone] with [employer] and left a voicemail requesting she follow up with Kwesi at [facility] with this pt to see if he can receive the vanco when he receives dialysis.

## 2017-04-21 NOTE — PROGRESS NOTES
Narcotic prescriptions unsigned and MD off premises. MD paged and notified. Prescriptions provided to hospitalist RN for signing in a.m. Hard copy vanco prescription provided to pt and copy to Gentry Lucero for administration after dialysis tomorrow.

## 2017-04-22 LAB
BACTERIA BLD CULT: NORMAL
BACTERIA BLD CULT: NORMAL
SIGNIFICANT IND 70042: NORMAL
SIGNIFICANT IND 70042: NORMAL
SITE SITE: NORMAL
SITE SITE: NORMAL
SOURCE SOURCE: NORMAL
SOURCE SOURCE: NORMAL

## 2017-04-25 ENCOUNTER — PATIENT OUTREACH (OUTPATIENT)
Dept: HEALTH INFORMATION MANAGEMENT | Facility: OTHER | Age: 77
End: 2017-04-25

## 2017-04-28 ENCOUNTER — OFFICE VISIT (OUTPATIENT)
Dept: CARDIOLOGY | Facility: MEDICAL CENTER | Age: 77
End: 2017-04-28
Payer: MEDICARE

## 2017-04-28 VITALS
SYSTOLIC BLOOD PRESSURE: 150 MMHG | HEIGHT: 69 IN | HEART RATE: 72 BPM | WEIGHT: 163 LBS | DIASTOLIC BLOOD PRESSURE: 70 MMHG | BODY MASS INDEX: 24.14 KG/M2 | OXYGEN SATURATION: 93 %

## 2017-04-28 DIAGNOSIS — I10 ESSENTIAL HYPERTENSION: ICD-10-CM

## 2017-04-28 DIAGNOSIS — N18.6 ESRD (END STAGE RENAL DISEASE) (HCC): ICD-10-CM

## 2017-04-28 DIAGNOSIS — M46.42 CERVICAL DISCITIS: ICD-10-CM

## 2017-04-28 DIAGNOSIS — I31.39 PERICARDIAL EFFUSION: ICD-10-CM

## 2017-04-28 PROCEDURE — G8420 CALC BMI NORM PARAMETERS: HCPCS | Performed by: NURSE PRACTITIONER

## 2017-04-28 PROCEDURE — 1111F DSCHRG MED/CURRENT MED MERGE: CPT | Performed by: NURSE PRACTITIONER

## 2017-04-28 PROCEDURE — G8598 ASA/ANTIPLAT THER USED: HCPCS | Performed by: NURSE PRACTITIONER

## 2017-04-28 PROCEDURE — 1101F PT FALLS ASSESS-DOCD LE1/YR: CPT | Performed by: NURSE PRACTITIONER

## 2017-04-28 PROCEDURE — 3017F COLORECTAL CA SCREEN DOC REV: CPT | Performed by: NURSE PRACTITIONER

## 2017-04-28 PROCEDURE — 4040F PNEUMOC VAC/ADMIN/RCVD: CPT | Performed by: NURSE PRACTITIONER

## 2017-04-28 PROCEDURE — 1036F TOBACCO NON-USER: CPT | Performed by: NURSE PRACTITIONER

## 2017-04-28 PROCEDURE — 99214 OFFICE O/P EST MOD 30 MIN: CPT | Performed by: NURSE PRACTITIONER

## 2017-04-28 PROCEDURE — G8432 DEP SCR NOT DOC, RNG: HCPCS | Performed by: NURSE PRACTITIONER

## 2017-04-28 ASSESSMENT — ENCOUNTER SYMPTOMS
FALLS: 0
COUGH: 0
SHORTNESS OF BREATH: 0
BLURRED VISION: 0
FOCAL WEAKNESS: 0
DIZZINESS: 0
ORTHOPNEA: 0
PALPITATIONS: 0
DOUBLE VISION: 0
HEADACHES: 0
NECK PAIN: 1
WEAKNESS: 0
WHEEZING: 0
LOSS OF CONSCIOUSNESS: 0

## 2017-04-28 NOTE — MR AVS SNAPSHOT
"        Parmjit Lisagreer   2017 1:40 PM   Office Visit   MRN: [id number]    Department:  [facility]   Dept Phone:  [phone]    Description:  Male : 1940   Provider:  ILAN Nur           Reason for Visit     Hospital Follow-up           Allergies as of 2017     No Known Allergies      You were diagnosed with     Pericardial effusion   [010612]       Essential hypertension   [6424147]         Vital Signs     Blood Pressure Pulse Height Weight Body Mass Index Oxygen Saturation    150/70 mmHg 72 1.74 m (5' 8.5\") 73.936 kg (163 lb) 24.42 kg/m2 93%    Smoking Status                   Former Smoker           Basic Information     Date Of Birth Sex Race Ethnicity Preferred Language    1940 Male White Non- English      Your appointments     May 10, 2017  8:00 AM   Established Patient with Champ Ortega M.D.   [address]    [address] [facility]  [place] [address] [phone]           You will be receiving a confirmation call a few days before your appointment from our automated call confirmation system.            May 11, 2017  2:00 PM   Follow Up Visit with ILAN Yuen   [address])    [address] [place] [address] [address]  [place] [address] [phone]           You will be receiving a confirmation call a few days before your appointment from our automated call confirmation system.            May 16, 2017  2:00 PM   Pulmonary Function Test with PFT-RM3   [facility] (--)    [address]  [address]  [place] [address] [phone]            May 16, 2017  3:00 PM   Established Patient Pul with ILAN Valdes   [facility] (--)    [address]  [address]  [place] [id number] [phone]            May 29, 2017  1:15 PM   ECHO with ECHO [facility]   ECHOCARDIOLOGY [facility])    [address]  [place] [address] [phone]   "        No prep            Jun 27, 2017  1:00 PM   FOLLOW UP with James Mendoza M.D.   [facility] (--)    [address], [address]  [place] [id number] [phone]              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   12/14/2012 - Present    Kidney damage S37.009A   1/29/2013 - Present    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   7/2/2013 - Present    Elevated coronary artery calcium score I25.10 High  2/20/2014 - Present    End stage renal disease (HCC) N18.6   3/24/2014 - Present    Dyslipidemia E78.5 Low  12/5/2014 - Present    Pigmented skin lesion L81.9   3/18/2015 - Present    Basal cell carcinoma of left cheek C44.319   3/26/2015 - Present    Anemia in CKD (chronic kidney disease) N18.9, D63.1   5/29/2015 - Present    Leg pain, bilateral M79.604, M79.605   8/10/2015 - Present    Primary osteoarthritis of both hands M19.041, M19.042   8/20/2015 - Present    Chronic gout M1A.9XX0   1/7/2016 - Present    Respiratory failure (CMS-HCC) J96.90   5/8/2016 - Present    Hyperkalemia, diminished renal excretion E87.5   5/8/2016 - Present    Pneumonia J18.9   5/8/2016 - Present    Pulmonary edema J81.1   5/8/2016 - Present    Anemia D64.9   5/8/2016 - Present    Leukocytosis D72.829   5/8/2016 - Present    COPD exacerbation (CMS-HCC) J44.1   5/8/2016 - Present    ESRD (end stage renal disease) on dialysis (CMS-[facility]) N18.6, Z99.2   5/8/2016 - Present    Fluid  overload E87.70   5/8/2016 - Present    Systolic CHF, acute (CMS-HCC) I50.21   5/11/2016 - Present    HTN (hypertension), malignant I10   5/11/2016 - Present    Bacteremia due to Staphylococcus aureus R78.81   5/11/2016 - Present    Hyperkalemia E87.5   5/11/2016 - Present    Renal cyst N28.1   5/11/2016 - Present    ESRD (end stage renal disease) (CMS-HCC) N18.6   8/12/2016 - Present    Hypotension I95.9 Medium  8/13/2016 - Present    Need for prophylactic vaccination with combined vaccine Z23   9/8/2016 - Present    AVM (arteriovenous malformation) Q27.30   9/8/2016 - Present    Dyspnea on exertion R06.09   3/16/2017 - Present    Nocturnal hypoxemia G47.34   3/21/2017 - Present    Pulmonary hypertension (CMS-HCC) I27.2   3/21/2017 - Present    Pedal edema R60.0   3/21/2017 - Present    Acute neck pain M54.2   4/15/2017 - Present    Bacteremia due to Gram-positive bacteria A49.9   4/16/2017 - Present    Pericardial effusion I31.3 Medium  4/20/2017 - Present    Aortic valve sclerosis I35.8 Medium  4/20/2017 - Present      Health Maintenance        Date Due Completion Dates    IMM DTaP/Tdap/Td Vaccine (1 - Tdap) 7/30/1961 ---    IMM ZOSTER VACCINE 7/30/2002 ---    IMM PNEUMOCOCCAL 65+ (ADULT) HIGH/HIGHEST RISK SERIES (2 of 2 - PCV13) 9/8/2017 9/8/2016    COLONOSCOPY 8/15/2026 8/15/2016, 9/16/2013            Current Immunizations     Influenza TIV (IM) 10/1/2013    Pneumococcal polysaccharide vaccine (PPSV-23) 9/8/2016      Below and/or attached are the medications your provider expects you to take. Review all of your home medications and newly ordered medications with your provider and/or pharmacist. Follow medication instructions as directed by your provider and/or pharmacist. Please keep your medication list with you and share with your provider. Update the information when medications are discontinued, doses are changed, or new medications (including over-the-counter products) are added; and carry medication  information at all times in the event of emergency situations     Allergies:  No Known Allergies          Medications  Valid as of: April 28, 2017 -  2:06 PM    Generic Name Brand Name Tablet Size Instructions for use    AmLODIPine Besylate (Tab) NORVASC 10 MG TAKE ONE TABLET BY MOUTH DAILY        B Complex-C-Folic Acid (Tab) DIALYVITE TABLET  TAKE ONE TABLET BY MOUTH DAILY        Budesonide-Formoterol Fumarate (Aerosol) SYMBICORT 160-4.5 MCG/ACT Inhale 2 Puffs by mouth 2 Times a Day. Inhale 2 puffs by mouth twice daily. Rinse mouth after each use.        Calcium Acetate (Phos Binder) (Cap) Calcium Acetate (Phos Binder) 667 MG TAKE TWO CAPSULES BY MOUTH THREE TIMES A DAY WITH A MEAL        Epoetin Mitchel (Solution) EPOGEN,PROCRIT 2000 UNIT/ML Inject 1 mL as instructed every Monday, Wednesday, and Friday.        Fluticasone Propionate (Suspension) FLONASE 50 MCG/ACT PLACE ONE TO TWO SPRAYS IN EACH NOSTRIL EVERY DAY        Furosemide (Tab) LASIX 40 MG TAKE TWO TABLETS BY MOUTH TWICE A DAY        Labetalol HCl (Tab) NORMODYNE 100 MG Take 5 Tabs by mouth 2 times a day.        Lisinopril (Tab) PRINIVIL, ZESTRIL 40 MG TAKE ONE TABLET BY MOUTH TWICE A DAY        Minoxidil (Tab) LONITEN 2.5 MG Take 2.5 mg by mouth every day.        Morphine Sulfate (Tab CR) MS CONTIN 30 MG Take 1 Tab by mouth every 12 hours.        Omeprazole (CAPSULE DELAYED RELEASE) PRILOSEC 40 MG TAKE ONE CAPSULE BY MOUTH DAILY (GENERIC FOR PRILOSEC)        Ondansetron (TABLET DISPERSIBLE) ZOFRAN ODT 4 MG DISSOLVE 1 TABLET BY MOUTH EVERY 6 HOURS AS NEEDED FOR NAUSEA        Oxycodone-Acetaminophen (Tab) PERCOCET 5-325 MG Take 1-2 Tabs by mouth every 6 hours as needed for Severe Pain.        Pravastatin Sodium (Tab) PRAVACHOL 10 MG Take 10 mg by mouth every evening.        Tamsulosin HCl (Cap) FLOMAX 0.4 MG TAKE TWO CAPSULES BY MOUTH DAILY 1/2 HOUR AFTER BREAKFAST        TraZODone HCl (Tab) DESYREL 150 MG TAKE TWO TABLETS BY MOUTH EVERY NIGHT AT BEDTIME  (GENERIC FOR DESYREL)        Vancomycin HCl (MD ALERT... VANCOMYCIN) MD ALERT... vancomycin  1 Each by Other route pharmacy to dose.        .                 Medicines prescribed today were sent to:     [facility] #[id number] - [place], [place] - [address] [facility]    [address] [facility] [place] [address]    Phone: [phone] Fax: [phone]    Open 24 Hours?: No      Medication refill instructions:       If your prescription bottle indicates you have medication refills left, it is not necessary to call your provider’s office. Please contact your pharmacy and they will refill your medication.    If your prescription bottle indicates you do not have any refills left, you may request refills at any time through one of the following ways: The online DxTerity system (except Urgent Care), by calling your provider’s office, or by asking your pharmacy to contact your provider’s office with a refill request. Medication refills are processed only during regular business hours and may not be available until the next business day. Your provider may request additional information or to have a follow-up visit with you prior to refilling your medication.   *Please Note: Medication refills are assigned a new Rx number when refilled electronically. Your pharmacy may indicate that no refills were authorized even though a new prescription for the same medication is available at the pharmacy. Please request the medicine by name with the pharmacy before contacting your provider for a refill.        Your To Do List     Future Labs/Procedures Complete By IFTTT LTD w/o Cont  As directed 4/28/2018         DxTerity Access Code: [email or web address]  Expires: 5/15/2017  2:51 PM    DxTerity  A secure, online tool to manage your health information     [email or web address]’s DxTerity® is a secure, online tool that connects you to your personalized health information from the privacy of your home -- day or night - making it very easy  for you to manage your healthcare. Once the activation process is completed, you can even access your medical information using the MobileVeda carmelo, which is available for free in the Apple Carmelo store or Google Play store.     MobileVeda provides the following levels of access (as shown below):   My Chart Features   [facility] Primary [facility] Doctor [facility]  Specialists [facility]  Urgent  Care Non-[facility]  Primary Care  Doctor   Email your healthcare team securely and privately 24/7 X X X    Manage appointments: schedule your next appointment; view details of past/upcoming appointments X      Request prescription refills. X      View recent personal medical records, including lab and immunizations X X X X   View health record, including health history, allergies, medications X X X X   Read reports about your outpatient visits, procedures, consult and ER notes X X X X   See your discharge summary, which is a recap of your hospital and/or ER visit that includes your diagnosis, lab results, and care plan. X X       How to register for MobileVeda:  1. Go to  [email or web address].  2. Click on the Sign Up Now box, which takes you to the New Member Sign Up page. You will need to provide the following information:  a. Enter your MobileVeda Access Code exactly as it appears at the top of this page. (You will not need to use this code after you’ve completed the sign-up process. If you do not sign up before the expiration date, you must request a new code.)   b. Enter your date of birth.   c. Enter your home email address.   d. Click Submit, and follow the next screen’s instructions.  3. Create a MobileVeda ID. This will be your MobileVeda login ID and cannot be changed, so think of one that is secure and easy to remember.  4. Create a MobileVeda password. You can change your password at any time.  5. Enter your Password Reset Question and Answer. This can be used at a later time if you forget your password.   6. Enter your e-mail address. This allows  you to receive e-mail notifications when new information is available in Treedom.  7. Click Sign Up. You can now view your health information.    For assistance activating your Treedom account, call [phone]

## 2017-04-28 NOTE — PROGRESS NOTES
"Subjective:   Parmjit Castelan is a 76 y.o. male who presents today with his wife for hospital follow-up.    He is patient of Dr. Mendoza in our clinic, HX: Hypertension, COPD, end-stage renal disease with dialysis, CAD, and hypercholesterolemia.    Patient went into the ER with neck pain denying any trauma and febrile at 100.2. Cervical MRI was done impression is evidence of an acute subacute discitis/osteomyelitis at the C6-C7. Moderate cervical spondylitic lytic changes and severe multilevel neural foraminal stenosis. Infectious disease recommended IV vancomycin during dialysis until 6/12/2017.. The blood culture was positive for Streptococcus viridans. Echo was also performed which showed a moderate pericardial effusion which is a chronic issue per cardiologist.    Patient here to follow-up. Denies any chest pain or palpitation. He has had no episodes of  dizziness/lightheadedness, shortness of breath, orthopnea, or peripheral edema.    Past Medical History   Diagnosis Date   • BPH 7/14/2009   • HTN (hypertension) 1/15/2010   • Snoring    • COPD (chronic obstructive pulmonary disease) (CMS-HCC)    • Proteinuria    • COPD (chronic obstructive pulmonary disease) (CMS-HCC) 8/2/2011   • EMPHYSEMA    • Detached retina    • CKD (chronic kidney disease) stage 4, GFR 15-29 ml/min (CMS-HCC) 1/15/2010     end stage renal disease   • Kidney cyst    • On supplemental oxygen therapy    • Heart burn      occas   • Indigestion      occas   • Bronchitis 1/1/13   • CAD (coronary artery disease) 2/20/2014   • Sleep apnea      O2 PER CANULA  AT NIGHT 2l/m   • Dialysis      m,w,f davita   • CATARACT      sanjuanita surgery complete   • Basal cell carcinoma of left cheek 3/26/2015   • Leg pain, bilateral 8/10/2015   • Gout    • Pneumonia    • Anesthesia      \"needs more sedation\" (can sometimes hear MD during procedure)    • High cholesterol      Past Surgical History   Procedure Laterality Date   • Cataract phaco with iol  4/8/08     " Performed by STACEY PHILLIPS at SURGERY SAME DAY [facility] ORS   • Av fistula creation  2/12/2010     Performed by DAVID CASON at SURGERY [facility] ORS   • Av fistula revision  2/19/2010     Performed by WILLIE HATCH at SURGERY [facility] ORS   • Av fistulogram  7/23/2010     Performed by DAVID CASON at SURGERY [facility] ORS   • Angioplasty balloon  7/23/2010     Performed by DAVID CASON at SURGERY [facility] ORS   • Av fistulogram  9/17/2010     Performed by DAVID CASON at SURGERY [facility] ORS   • Angioplasty balloon  9/17/2010     Performed by DAVID CASON at SURGERY [facility] ORS   • Vitrectomy posterior  1/18/2011     Performed by NAHUM GE at SURGERY SAME DAY [facility] ORS   • Scleral buckling  1/18/2011     Performed by NAHUM GE at SURGERY SAME DAY [facility] ORS   • Recovery  8/12/2011     Performed by SURGERY, IR-RECOVERY at SURGERY SAME DAY [facility] ORS   • Vitrectomy posterior  10/11/2011     Performed by NAHUM GE at SURGERY SAME DAY [facility] ORS   • Recovery  7/23/2012     Performed by SURGERY, IR-RECOVERY at SURGERY SAME DAY [facility] ORS   • Recovery  1/29/2013     Performed by Ir-Recovery Surgery at SURGERY SAME DAY [facility] ORS   • Recovery  7/2/2013     Performed by Ir-Recovery Surgery at SURGERY SAME DAY [facility] ORS   • Av fistula thrombolysis  7/2/2013     Performed by David Cason M.D. at SURGERY [facility] ORS   • Recovery  12/17/2013     Performed by Ir-Recovery Surgery at SURGERY SAME DAY [facility] ORS   • Recovery  3/24/2014     Performed by Ir-Recovery Surgery at SURGERY [facility]   • Recovery  7/29/2014     Performed by Ir-Recovery Surgery at SURGERY SAME DAY [facility] ORS   • Recovery  3/24/2015     Performed by [facility] at [place] [facility] [facility]   • Recovery  12/23/2015     Procedure: VASCULAR CASE-BLANKA-LEFT ARM FISTULOGRAM WITH ANGIOPLASTY;  Surgeon: [facility];  Location:  SURGERY PRE-POST PROC UNIT [facility];  Service:    • Gastroscopy with biopsy  8/13/2016     Procedure: GASTROSCOPY WITH BIOPSY;  Surgeon: Jorge Leavitt M.D.;  Location: ENDOSCOPY [facility];  Service:    • Colonoscopy - endo  8/15/2016     Procedure: COLONOSCOPY - ENDO;  Surgeon: Mane Whatley M.D.;  Location: ENDOSCOPY [facility];  Service:      Family History   Problem Relation Age of Onset   • Lung Disease Father      Emphysema, resp failure   • Genitourinary () Maternal Aunt      hematuria   • Stroke Mother    • Hypertension Mother    • Hypertension Brother      History   Smoking status   • Former Smoker -- 1.00 packs/day for 40 years   • Types: Cigarettes   • Quit date: 01/01/2009   Smokeless tobacco   • Never Used     Comment: 1 pk a day for 35 yrs, QUIT JAN 1 2010     No Known Allergies  Outpatient Encounter Prescriptions as of 4/28/2017   Medication Sig Dispense Refill   • Vancomycin HCl (MD ALERT... VANCOMYCIN) 1 Each by Other route pharmacy to dose. 1 Each 25   • oxycodone-acetaminophen (PERCOCET) 5-325 MG Tab Take 1-2 Tabs by mouth every 6 hours as needed for Severe Pain. 20 Tab 0   • trazodone (DESYREL) 150 MG Tab TAKE TWO TABLETS BY MOUTH EVERY NIGHT AT BEDTIME (GENERIC FOR DESYREL) 180 Tab 4   • ondansetron (ZOFRAN ODT) 4 MG TABLET DISPERSIBLE DISSOLVE 1 TABLET BY MOUTH EVERY 6 HOURS AS NEEDED FOR NAUSEA 30 Tab 4   • B Complex-C-Folic Acid (DIALYVITE TABLET) Tab TAKE ONE TABLET BY MOUTH DAILY 90 Tab 4   • tamsulosin (FLOMAX) 0.4 MG capsule TAKE TWO CAPSULES BY MOUTH DAILY 1/2 HOUR AFTER BREAKFAST 180 Cap 11   • Calcium Acetate, Phos Binder, 667 MG Cap TAKE TWO CAPSULES BY MOUTH THREE TIMES A DAY WITH A MEAL 180 Cap 11   • labetalol (NORMODYNE) 100 MG Tab Take 5 Tabs by mouth 2 times a day. 300 Tab 6   • fluticasone (FLONASE) 50 MCG/ACT nasal spray PLACE ONE TO TWO SPRAYS IN EACH NOSTRIL EVERY DAY 1 Bottle 5   • omeprazole (PRILOSEC) 40 MG delayed-release capsule TAKE ONE CAPSULE BY MOUTH  "DAILY (GENERIC FOR PRILOSEC) 90 Cap 4   • epoetin hilario (EPOGEN,PROCRIT) 2000 UNIT/ML Solution Inject 1 mL as instructed every Monday, Wednesday, and Friday. 1 mL 0   • pravastatin (PRAVACHOL) 10 MG Tab Take 10 mg by mouth every evening.     • minoxidil (LONITEN) 2.5 MG Tab Take 2.5 mg by mouth every day.     • amlodipine (NORVASC) 10 MG Tab TAKE ONE TABLET BY MOUTH DAILY 90 Tab 4   • budesonide-formoterol (SYMBICORT) 160-4.5 MCG/ACT Aerosol Inhale 2 Puffs by mouth 2 Times a Day. Inhale 2 puffs by mouth twice daily. Rinse mouth after each use. 1 Inhaler 12   • lisinopril (PRINIVIL, ZESTRIL) 40 MG tablet TAKE ONE TABLET BY MOUTH TWICE A DAY 60 Tab 11   • furosemide (LASIX) 40 MG Tab TAKE TWO TABLETS BY MOUTH TWICE A  Tab 9   • morphine ER (MS CONTIN) 30 MG Tab CR tablet Take 1 Tab by mouth every 12 hours. (Patient not taking: Reported on 4/28/2017) 30 Tab 0     No facility-administered encounter medications on file as of 4/28/2017.     Review of Systems   Constitutional: Negative for malaise/fatigue.   Eyes: Negative for blurred vision and double vision.   Respiratory: Negative for cough, shortness of breath and wheezing.    Cardiovascular: Negative for chest pain, palpitations, orthopnea and leg swelling.   Musculoskeletal: Positive for neck pain. Negative for falls.   Neurological: Negative for dizziness, focal weakness, loss of consciousness, weakness and headaches.   All other systems reviewed and are negative.       Objective:   /70 mmHg  Pulse 72  Ht 1.74 m (5' 8.5\")  Wt 73.936 kg (163 lb)  BMI 24.42 kg/m2  SpO2 93%    Physical Exam   Constitutional: He is oriented to person, place, and time. He appears well-developed and well-nourished.   HENT:   Head: Normocephalic.   Neck: Normal range of motion. No JVD present.   Cardiovascular: Normal rate, regular rhythm and normal heart sounds.    No murmur heard.  Pulmonary/Chest: Effort normal and breath sounds normal. No respiratory distress. He has " no wheezes.   Abdominal: Bowel sounds are normal.   Musculoskeletal: He exhibits no edema or tenderness.   Neurological: He is alert and oriented to person, place, and time.   Skin: Skin is warm and dry.   Psychiatric: His behavior is normal. Judgment normal.   Nursing note and vitals reviewed.        Echocardiography Laboratory  4/11/2017  Normal left ventricular chamber size. Moderate concentric left ventricular hypertrophy. Normal left ventricular systolic function.   Left ventricular ejection fraction is visually estimated to be 65%.   Normal regional wall motion. Diastolic function is difficult to assess.  Enlarged right atrium. Dilated inferior vena cava without inspiratory collapse.  Mildly dilated left atrium.  Structurally normal aortic valve. Aortic sclerosis with mild stenosis.   Vmax is 2.6  m/s. Transvalvular gradients are - Peak: 27 mmHg, Mean: 17   mmHg. Aortic valve area calculated from the continuity equation is 2.1 sq cm. No aortic insufficiency.  Moderate tricuspid regurgitation. Estimated right ventricular systolic pressure  is 60 mmHg.  Compared to the report of the study done - there has been.    Moderate pericardial effusion without evidence of hemodynamic compromise.   Compared to the report of the study done - there has been and increase in the pericardial effusion and slight increase in the estimated RVSP.   Known dialysis patient.    Echocardiography Laboratory  5/9/2016  Normal left ventricular chamber size.  Left ventricular ejection fraction is visually estimated to be 5%.  Mild mitral regurgitation.  Moderately dilated left atrium.  Aortic sclerosis without stenosis.  Mild tricuspid regurgitation.  Right ventricular systolic pressure is estimated to be 52-57 mmHg.  Dilated inferior vena cava with partial inspiratory collapse.  The right ventricle was normal in size and function.  Small pericardial effusion without evidence of hemodynamic compromise.      Assessment:     1. Pericardial  effusion  Echocardiogram LTD w/o Cont   2. Essential hypertension     3. Cervical discitis     4. ESRD (end stage renal disease) (CMS-[facility])         Medical Decision Making:  Today's Assessment / Status / Plan:     1. Pericardial effusion:   - Last echo on April showed moderate pericardial effusion.  - denies any symptoms, and educated the patient and his wife if they have any symptoms and the need to report to the ER.  - Repeat limited echo in 2 months to evaluate pericardial effusion.    2. Hypertension:  - Patient's blood pressure is slightly elevated. Patient stated that his blood pressure goes lower during the afternoons or evenings. He is also on hemodialysis.  - Continue to take lisinopril 40 mg, labetalol 500 mg twice a day, minoxidil 2.5mg daily and amlodipine 10 mg daily.  - Continue to monitor blood pressures at home    3.  Cervical discitis:  - Neck pain is improving and patient is getting IV Vanco during dialysis.    4. ESRD:  - Followed by nephrology    Follow-up in 2 months with Dr. Mendoza, sooner as needed. ECHO and Labs prior to the appt.    Collaborating Provider: Dr. Dameon Pineda

## 2017-05-04 ENCOUNTER — TELEPHONE (OUTPATIENT)
Dept: PULMONOLOGY | Facility: HOSPICE | Age: 77
End: 2017-05-04

## 2017-05-04 NOTE — TELEPHONE ENCOUNTER
Spoke to the Pt's DME they stated that the pt did complete the Cnox, but there was not enough data. They offered the pt to repeat testing and pt refused.

## 2017-05-09 NOTE — DOCUMENTATION QUERY
"DOCUMENTATION QUERY    PROVIDERS: Please select “Cosign w/ note”to reply to query.    To better represent the severity of illness of your patient, please review the following information and exercise your independent professional judgment in responding to this query.     The previous query response was unclear as the response was \"chronic\" without a diagnosis linked to chronic. COPD with O2 dependent 2L is documented in the History and Physical and Progress Notes. Based upon the clinical findings, risk factors, and treatment, can a diagnosis be further specified?          • Chronic Respiratory Failure  • Chronic Hypoxic Respiratory Failure  • Chronic Hypoxic Without Respiratory Failure  • Other Explanation OF Clinical Findings( please document)        Unable to determine      The medical record reflects the following:   Clinical Findings  COPD with 2 liter O2 dependence, Provide supplementary oxygen, Incentive spirometry, Respiratory protocol   Treatment  Supplemental oxygen, Proventil Nebs,    Risk Factors  Acute Respiratory Failure   Location within medical record  History and Physical and Progress Notes     Thank you,   ROB Brown, CCS, HIM        "

## 2017-05-10 ENCOUNTER — APPOINTMENT (OUTPATIENT)
Dept: MEDICAL GROUP | Age: 77
End: 2017-05-10
Payer: MEDICARE

## 2017-05-11 ENCOUNTER — OFFICE VISIT (OUTPATIENT)
Dept: INFECTIOUS DISEASES | Facility: MEDICAL CENTER | Age: 77
End: 2017-05-11
Payer: MEDICARE

## 2017-05-11 VITALS
BODY MASS INDEX: 24.73 KG/M2 | OXYGEN SATURATION: 93 % | RESPIRATION RATE: 16 BRPM | HEIGHT: 69 IN | TEMPERATURE: 99.3 F | WEIGHT: 167 LBS | DIASTOLIC BLOOD PRESSURE: 62 MMHG | HEART RATE: 78 BPM | SYSTOLIC BLOOD PRESSURE: 158 MMHG

## 2017-05-11 DIAGNOSIS — Z99.2 ESRD (END STAGE RENAL DISEASE) ON DIALYSIS (HCC): ICD-10-CM

## 2017-05-11 DIAGNOSIS — A49.1 STREPTOCOCCUS VIRIDANS INFECTION: ICD-10-CM

## 2017-05-11 DIAGNOSIS — M46.42 CERVICAL DISCITIS: ICD-10-CM

## 2017-05-11 DIAGNOSIS — R78.81 BACTEREMIA DUE TO GRAM-POSITIVE BACTERIA: ICD-10-CM

## 2017-05-11 DIAGNOSIS — I31.39 PERICARDIAL EFFUSION: ICD-10-CM

## 2017-05-11 DIAGNOSIS — N18.6 ESRD (END STAGE RENAL DISEASE) ON DIALYSIS (HCC): ICD-10-CM

## 2017-05-11 PROCEDURE — 3017F COLORECTAL CA SCREEN DOC REV: CPT | Performed by: NURSE PRACTITIONER

## 2017-05-11 PROCEDURE — G8432 DEP SCR NOT DOC, RNG: HCPCS | Performed by: NURSE PRACTITIONER

## 2017-05-11 PROCEDURE — 1036F TOBACCO NON-USER: CPT | Performed by: NURSE PRACTITIONER

## 2017-05-11 PROCEDURE — 99214 OFFICE O/P EST MOD 30 MIN: CPT | Performed by: NURSE PRACTITIONER

## 2017-05-11 PROCEDURE — 4040F PNEUMOC VAC/ADMIN/RCVD: CPT | Performed by: NURSE PRACTITIONER

## 2017-05-11 PROCEDURE — G8419 CALC BMI OUT NRM PARAM NOF/U: HCPCS | Performed by: NURSE PRACTITIONER

## 2017-05-11 PROCEDURE — 1111F DSCHRG MED/CURRENT MED MERGE: CPT | Performed by: NURSE PRACTITIONER

## 2017-05-11 PROCEDURE — 1101F PT FALLS ASSESS-DOCD LE1/YR: CPT | Performed by: NURSE PRACTITIONER

## 2017-05-11 PROCEDURE — G8598 ASA/ANTIPLAT THER USED: HCPCS | Performed by: NURSE PRACTITIONER

## 2017-05-11 NOTE — MR AVS SNAPSHOT
"Parmjit Castelan   2017 2:00 PM   Office Visit   MRN: [id number]    Department:  [facility]   Dept Phone:  [phone]    Description:  Male : 1940   Provider:  ILAN Yuen           Reason for Visit     Follow-Up bacteremia due to staphylococcus      Allergies as of 2017     No Known Allergies      Vital Signs     Blood Pressure Pulse Temperature Respirations Height Weight    158/62 mmHg 78 37.4 °C (99.3 °F) 16 1.74 m (5' 8.5\") 75.751 kg (167 lb)    Body Mass Index Oxygen Saturation Smoking Status             25.02 kg/m2 93% Former Smoker         Basic Information     Date Of Birth Sex Race Ethnicity Preferred Language    1940 Male White Non- English      Your appointments     May 16, 2017 11:30 AM   Follow Up Visit with Ara Whittaker M.D.   [facility] ([address] [place] [address])    [address] [facility] [address] [facility] [facility] [address]  [place] [address] [phone]           You will be receiving a confirmation call a few days before your appointment from our automated call confirmation system.            May 16, 2017  2:00 PM   Pulmonary Function Test with PFT-RM3   [facility] (--)    [address]  [address]  [place] [id number] [phone]            May 16, 2017  3:00 PM   Established Patient Pul with ILAN Valdes   [facility] (--)    [address]  [address]  [place] [address] [phone]            May 29, 2017  1:15 PM   ECHO with [facility]   [facility])    [address]  [place] [address]   [phone]           No prep            2017  1:00 PM   FOLLOW UP with James Mendoza M.D.   [facility] (--)    [address]  [place] [id number] [phone]              Problem List              ICD-10-CM Priority Class Noted - Resolved    BPH (benign prostatic hypertrophy) N40.0 Low  2009 - Present    Essential " hypertension I10 High  1/15/2010 - Present    Hard of hearing H91.90   4/22/2011 - Present    Preventative health care Z00.00   4/22/2011 - Present    HELGA (obstructive sleep apnea) G47.33   5/13/2011 - Present    COPD (chronic obstructive pulmonary disease) (CMS-HCC) J44.9   8/2/2011 - Present    BPH (benign prostatic hyperplasia) N40.0   1/13/2012 - Present    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   12/14/2012 - Present    Kidney damage S37.009A   1/29/2013 - Present    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   7/2/2013 - Present    Elevated coronary artery calcium score R93.1 High  2/20/2014 - Present    End stage renal disease (HCC) N18.6   3/24/2014 - Present    Dyslipidemia E78.5 Low  12/5/2014 - Present    Pigmented skin lesion L81.9   3/18/2015 - Present    Basal cell carcinoma of left cheek C44.319   3/26/2015 - Present    Anemia in CKD (chronic kidney disease) N18.9, D63.1   5/29/2015 - Present    Leg pain, bilateral M79.604, M79.605   8/10/2015 - Present    Primary osteoarthritis of both hands M19.041, M19.042   8/20/2015 - Present    Chronic gout M1A.9XX0   1/7/2016 - Present    Respiratory failure (CMS-HCC) J96.90   5/8/2016 - Present    Hyperkalemia, diminished renal excretion E87.5   5/8/2016 - Present    Pneumonia J18.9   5/8/2016 - Present    Pulmonary edema J81.1   5/8/2016 - Present    Anemia D64.9   5/8/2016 - Present    Leukocytosis D72.829   5/8/2016 - Present    COPD exacerbation (CMS-HCC) J44.1   5/8/2016 - Present    ESRD (end stage renal disease) on dialysis (CMS-HCC) N18.6, Z99.2   5/8/2016 - Present    Fluid overload E87.70   5/8/2016 - Present    Systolic CHF, acute (CMS-HCC) I50.21   5/11/2016 - Present    HTN (hypertension), malignant I10   5/11/2016 - Present    Bacteremia due to Staphylococcus aureus R78.81   5/11/2016 - Present    Hyperkalemia E87.5   5/11/2016 - Present    Renal cyst N28.1   5/11/2016 - Present    ESRD (end stage renal disease) (CMS-HCC) N18.6    8/12/2016 - Present    Hypotension I95.9 Medium  8/13/2016 - Present    Need for prophylactic vaccination with combined vaccine Z23   9/8/2016 - Present    AVM (arteriovenous malformation) Q27.30   9/8/2016 - Present    Dyspnea on exertion R06.09   3/16/2017 - Present    Nocturnal hypoxemia G47.34   3/21/2017 - Present    Pulmonary hypertension (CMS-HCC) I27.2   3/21/2017 - Present    Pedal edema R60.0   3/21/2017 - Present    Acute neck pain M54.2   4/15/2017 - Present    Bacteremia due to Gram-positive bacteria A49.9   4/16/2017 - Present    Pericardial effusion I31.3 Medium  4/20/2017 - Present    Aortic valve sclerosis I35.8 Medium  4/20/2017 - Present    Cervical discitis M46.42   4/28/2017 - Present      Health Maintenance        Date Due Completion Dates    IMM DTaP/Tdap/Td Vaccine (1 - Tdap) 7/30/1961 ---    IMM ZOSTER VACCINE 7/30/2002 ---    IMM PNEUMOCOCCAL 65+ (ADULT) HIGH/HIGHEST RISK SERIES (2 of 2 - PCV13) 9/8/2017 9/8/2016    COLONOSCOPY 8/15/2026 8/15/2016, 9/16/2013            Current Immunizations     Influenza TIV (IM) 10/1/2013    Pneumococcal polysaccharide vaccine (PPSV-23) 9/8/2016      Below and/or attached are the medications your provider expects you to take. Review all of your home medications and newly ordered medications with your provider and/or pharmacist. Follow medication instructions as directed by your provider and/or pharmacist. Please keep your medication list with you and share with your provider. Update the information when medications are discontinued, doses are changed, or new medications (including over-the-counter products) are added; and carry medication information at all times in the event of emergency situations     Allergies:  No Known Allergies          Medications  Valid as of: May 11, 2017 -  2:33 PM    Generic Name Brand Name Tablet Size Instructions for use    AmLODIPine Besylate (Tab) NORVASC 10 MG TAKE ONE TABLET BY MOUTH DAILY        B Complex-C-Folic Acid  (Tab) DIALYVITE TABLET  TAKE ONE TABLET BY MOUTH DAILY        Budesonide-Formoterol Fumarate (Aerosol) SYMBICORT 160-4.5 MCG/ACT Inhale 2 Puffs by mouth 2 Times a Day. Inhale 2 puffs by mouth twice daily. Rinse mouth after each use.        Calcium Acetate (Phos Binder) (Cap) Calcium Acetate (Phos Binder) 667 MG TAKE TWO CAPSULES BY MOUTH THREE TIMES A DAY WITH A MEAL        Epoetin Mitchel (Solution) EPOGEN,PROCRIT 2000 UNIT/ML Inject 1 mL as instructed every Monday, Wednesday, and Friday.        Fluticasone Propionate (Suspension) FLONASE 50 MCG/ACT PLACE ONE TO TWO SPRAYS IN EACH NOSTRIL EVERY DAY        Furosemide (Tab) LASIX 40 MG TAKE TWO TABLETS BY MOUTH TWICE A DAY        Labetalol HCl (Tab) NORMODYNE 100 MG Take 5 Tabs by mouth 2 times a day.        Lisinopril (Tab) PRINIVIL, ZESTRIL 40 MG TAKE ONE TABLET BY MOUTH TWICE A DAY        Minoxidil (Tab) LONITEN 2.5 MG Take 2.5 mg by mouth every day.        Morphine Sulfate (Tab CR) MS CONTIN 30 MG Take 1 Tab by mouth every 12 hours.        Omeprazole (CAPSULE DELAYED RELEASE) PRILOSEC 40 MG TAKE ONE CAPSULE BY MOUTH DAILY (GENERIC FOR PRILOSEC)        Ondansetron (TABLET DISPERSIBLE) ZOFRAN ODT 4 MG DISSOLVE 1 TABLET BY MOUTH EVERY 6 HOURS AS NEEDED FOR NAUSEA        Oxycodone-Acetaminophen (Tab) PERCOCET 5-325 MG Take 1-2 Tabs by mouth every 6 hours as needed for Severe Pain.        Pravastatin Sodium (Tab) PRAVACHOL 10 MG Take 10 mg by mouth every evening.        Tamsulosin HCl (Cap) FLOMAX 0.4 MG TAKE TWO CAPSULES BY MOUTH DAILY 1/2 HOUR AFTER BREAKFAST        TraZODone HCl (Tab) DESYREL 150 MG TAKE TWO TABLETS BY MOUTH EVERY NIGHT AT BEDTIME (GENERIC FOR DESYREL)        Vancomycin HCl (MD ALERT... VANCOMYCIN) MD ALERT... vancomycin  1 Each by Other route pharmacy to dose.        .                 Medicines prescribed today were sent to:     [id number] - [place], [place] - [address]    [address] [facility] [place] [address]    Phone:  [phone] Fax: [phone]    Open 24 Hours?: No      Medication refill instructions:       If your prescription bottle indicates you have medication refills left, it is not necessary to call your provider’s office. Please contact your pharmacy and they will refill your medication.    If your prescription bottle indicates you do not have any refills left, you may request refills at any time through one of the following ways: The online [employer] system (except Urgent Care), by calling your provider’s office, or by asking your pharmacy to contact your provider’s office with a refill request. Medication refills are processed only during regular business hours and may not be available until the next business day. Your provider may request additional information or to have a follow-up visit with you prior to refilling your medication.   *Please Note: Medication refills are assigned a new Rx number when refilled electronically. Your pharmacy may indicate that no refills were authorized even though a new prescription for the same medication is available at the pharmacy. Please request the medicine by name with the pharmacy before contacting your provider for a refill.           M[employer] Access Code: [email or web address]  Expires: 5/15/2017  2:51 PM    [employer] Media  A secure, online tool to manage your health information     [employer]® is a secure, online tool that connects you to your personalized health information from the privacy of your home -- day or night - making it very easy for you to manage your healthcare. Once the activation process is completed, you can even access your medical information using the Maker [employer] carmelo, which is available for free in the Apple Carmelo store or Google Play store.     M[employer] provides the following levels of access (as shown below):   My Chart Features   [facility] Doctor [facility]  Specialists [facility]  Urgent  Care Non-[facility]  Primary Care  Doctor   Email your healthcare team  securely and privately 24/7 X X X    Manage appointments: schedule your next appointment; view details of past/upcoming appointments X      Request prescription refills. X      View recent personal medical records, including lab and immunizations X X X X   View health record, including health history, allergies, medications X X X X   Read reports about your outpatient visits, procedures, consult and ER notes X X X X   See your discharge summary, which is a recap of your hospital and/or ER visit that includes your diagnosis, lab results, and care plan. X X       How to register for [employer]:  1. Go to  [email or web address].  2. Click on the Sign Up Now box, which takes you to the New Member Sign Up page. You will need to provide the following information:  a. Enter your Yieldr Access Code exactly as it appears at the top of this page. (You will not need to use this code after you’ve completed the sign-up process. If you do not sign up before the expiration date, you must request a new code.)   b. Enter your date of birth.   c. Enter your home email address.   d. Click Submit, and follow the next screen’s instructions.  3. Create a Yieldr ID. This will be your Yieldr login ID and cannot be changed, so think of one that is secure and easy to remember.  4. Create a Yieldr password. You can change your password at any time.  5. Enter your Password Reset Question and Answer. This can be used at a later time if you forget your password.   6. Enter your e-mail address. This allows you to receive e-mail notifications when new information is available in Yieldr.  7. Click Sign Up. You can now view your health information.    For assistance activating your Yieldr account, call [phone]

## 2017-05-11 NOTE — PROGRESS NOTES
[facility]    Subjective:     Chief Complaint   Patient presents with   • Follow-Up     bacteremia due to staphylococcus     This is my first time meeting Mr. Castelan.  He is accompanied by his wife, Yaquelin.    Interval History: 76 y.o. white male known to ID, previously treated for MSSA bacteremia in May of 2016, with a history of end-stage renal disease on hemodialysis, COPD, hypertension, BPH, and congestive heart failure.  Pt hospitalized from 4/15- 4/20/17, admitted d/t worsening neck and shoulder blade pain x 1 week and fevers, up to 100.9.  Bcx on 4/15 +Strep Viridans.  MRI cervical spine on 4/18 +C6-7 discitis/OM.  Echo from 4/11 showed moderate pericardial effusion without evidence of hemodynamic compromise.  Pt was evaluated by Cardiology for the pericardial effusion with no concern for infection.  Pt discharged home on IV Vanco p HD for Strep Viridans bacteremia and C6-7 discitis/OM, end date 6/12/17.     Hospital records reviewed    Today, 5/11/2017: Patient reports feeling better, denies pain to neck and shoulders.  The pain that he had to his neck and shoulders began to subside once he started the IV Vanco.  Pt upset that he is not seeing Dr. Whittaker today.  Pt has been receiving the IV Vanco p HD without issue.  Denies feeling generally ill, fevers/chills, general malaise, headache, n/v/d, abdominal pain, chest pain or shortness of breath.  He is more fatigued than usual, but is still able to remain active.  Pt working with PT, which he feels is helping.  He was seen by Cardiology last week, plan for a repeat Echo at the end of the month and FU again with Dr. Mendoza in June.  Pt was seen by a Neurosurgeon who told the pt there was no plan for surgery and recommended that he see a Neurologist.     ROS  As documented above in my HPI.    Past Medical History   Diagnosis Date   • BPH 7/14/2009   • HTN (hypertension) 1/15/2010   • Snoring    • COPD (chronic obstructive pulmonary disease)  "(CMS-HCC)    • Proteinuria    • COPD (chronic obstructive pulmonary disease) (CMS-HCC) 8/2/2011   • EMPHYSEMA    • Detached retina    • CKD (chronic kidney disease) stage 4, GFR 15-29 ml/min (CMS-HCC) 1/15/2010     end stage renal disease   • Kidney cyst    • On supplemental oxygen therapy    • Heart burn      occas   • Indigestion      occas   • Bronchitis 1/1/13   • CAD (coronary artery disease) 2/20/2014   • Sleep apnea      O2 PER CANULA  AT NIGHT 2l/m   • Dialysis      m,w,f [facility]   • CATARACT      sanjuanita surgery complete   • Basal cell carcinoma of left cheek 3/26/2015   • Leg pain, bilateral 8/10/2015   • Gout    • Pneumonia    • Anesthesia      \"needs more sedation\" (can sometimes hear MD during procedure)    • High cholesterol        Social History   Substance Use Topics   • Smoking status: Former Smoker -- 1.00 packs/day for 40 years     Types: Cigarettes     Quit date: 01/01/2009   • Smokeless tobacco: Never Used      Comment: 1 pk a day for 35 yrs, QUIT JAN 1 2010   • Alcohol Use: No       Allergies: Review of patient's allergies indicates no known allergies.    Pt's medication and problem list reviewed.     Objective:     PE:  /62 mmHg  Pulse 78  Temp(Src) 37.4 °C (99.3 °F)  Resp 16  Ht 1.74 m (5' 8.5\")  Wt 75.751 kg (167 lb)  BMI 25.02 kg/m2  SpO2 93%    Vital signs reviewed    Constitutional: Appears well-developed and well-nourished. No acute distress.  Speech fluent.    Eyes: Conjunctivae normal and EOM are normal. Pupils are equal, round, and reactive to light.   Neck: Trachea midline. Normal range of motion.  Slightly tender with palpation.  Respiratory: No respiratory distress, unlabored respiratory effort.  Lungs clear to auscultation bilaterally. No wheezes or rales.   Cardiovascular: Normal rate, regular rhythm, normal heart sounds. +Murmur.  No edema.  Abdomen: Soft, non tender, non-distended. BS + x 4. No masses.   Musculoskeletal: Steady gait.  Normal range of motion.    L " forearm AV fistula +bruit, +thrill.  Skin: Warm and dry. No visible rashes or lesions.  Tan.  Neurological: No cranial nerve deficit. Coordination normal.   Psychiatric: Alert and oriented to person, place, and time.     Labs:  No currently labs from [facility] available for review.    Assessment and Plan:   The following treatment plan was discussed with patient at length:    1. Bacteremia due to Gram-positive bacteria      Continue IV Vanco p HD, end date 6/12/17.  Recommend weekly labs: CBC and CMP.   2. Streptococcus viridans infection      As above.   3. Cervical discitis      Abx as above.  No plan for surgery at this time per Neurosurgeon.  Neurosurgeon recommended pt see Neurologist.   4. Pericardial effusion      Continue FU with cardiology.  Plan to see Dr. Mendoza in June.  Echo scheduled for 5/29.   5. ESRD (end stage renal disease) on dialysis (CMS-[facility])      Continue HD as directed by Nephrology.     Follow up: 2 weeks, RTC sooner if needed. FU with PCP for ongoing chronic medical conditions.     MY Yuen.R.N.       >25 min spent face to face with patient, >50% of time spent counseling, coordinating care, reviewing records, discussing POC, educating patient.    Will send a copy of today's note to pt's Nephrologist, Dr. Larson.

## 2017-05-16 ENCOUNTER — APPOINTMENT (OUTPATIENT)
Dept: PULMONOLOGY | Facility: HOSPICE | Age: 77
End: 2017-05-16
Payer: MEDICARE

## 2017-05-16 ENCOUNTER — OFFICE VISIT (OUTPATIENT)
Dept: INFECTIOUS DISEASES | Facility: MEDICAL CENTER | Age: 77
End: 2017-05-16
Payer: MEDICARE

## 2017-05-16 VITALS
RESPIRATION RATE: 16 BRPM | OXYGEN SATURATION: 95 % | DIASTOLIC BLOOD PRESSURE: 66 MMHG | HEART RATE: 75 BPM | SYSTOLIC BLOOD PRESSURE: 126 MMHG | TEMPERATURE: 99 F | WEIGHT: 166 LBS | HEIGHT: 69 IN | BODY MASS INDEX: 24.59 KG/M2

## 2017-05-16 DIAGNOSIS — Z99.2 ESRD (END STAGE RENAL DISEASE) ON DIALYSIS (HCC): ICD-10-CM

## 2017-05-16 DIAGNOSIS — N18.6 ESRD (END STAGE RENAL DISEASE) ON DIALYSIS (HCC): ICD-10-CM

## 2017-05-16 DIAGNOSIS — I31.39 PERICARDIAL EFFUSION: ICD-10-CM

## 2017-05-16 DIAGNOSIS — A49.1 STREPTOCOCCUS VIRIDANS INFECTION: ICD-10-CM

## 2017-05-16 DIAGNOSIS — M46.42 CERVICAL DISCITIS: ICD-10-CM

## 2017-05-16 PROCEDURE — G8432 DEP SCR NOT DOC, RNG: HCPCS | Performed by: INTERNAL MEDICINE

## 2017-05-16 PROCEDURE — 4040F PNEUMOC VAC/ADMIN/RCVD: CPT | Performed by: INTERNAL MEDICINE

## 2017-05-16 PROCEDURE — 1036F TOBACCO NON-USER: CPT | Performed by: INTERNAL MEDICINE

## 2017-05-16 PROCEDURE — G8598 ASA/ANTIPLAT THER USED: HCPCS | Performed by: INTERNAL MEDICINE

## 2017-05-16 PROCEDURE — 1111F DSCHRG MED/CURRENT MED MERGE: CPT | Performed by: INTERNAL MEDICINE

## 2017-05-16 PROCEDURE — G8420 CALC BMI NORM PARAMETERS: HCPCS | Performed by: INTERNAL MEDICINE

## 2017-05-16 PROCEDURE — 3017F COLORECTAL CA SCREEN DOC REV: CPT | Performed by: INTERNAL MEDICINE

## 2017-05-16 PROCEDURE — 99214 OFFICE O/P EST MOD 30 MIN: CPT | Performed by: INTERNAL MEDICINE

## 2017-05-16 PROCEDURE — 1101F PT FALLS ASSESS-DOCD LE1/YR: CPT | Performed by: INTERNAL MEDICINE

## 2017-05-16 NOTE — MR AVS SNAPSHOT
"Parmjit Castelan   2017 11:30 AM   Office Visit   MRN: [id number]    Department:  [facility]   Dept Phone:  [phone]    Description:  Male : 1940   Provider:  Ara Whittaker M.D.           Reason for Visit     Follow-Up Follow up visit. Pt concerned about infection in neck.      Allergies as of 2017     No Known Allergies      Vital Signs     Blood Pressure Pulse Temperature Respirations Height Weight    126/66 mmHg 75 37.2 °C (99 °F) 16 1.74 m (5' 8.5\") 75.297 kg (166 lb)    Body Mass Index Oxygen Saturation Smoking Status             24.87 kg/m2 95% Former Smoker         Basic Information     Date Of Birth Sex Race Ethnicity Preferred Language    1940 Male White Non- English      Your appointments     May 29, 2017  1:15 PM   ECHO with ECHO [facility]   ECHOCARDIOLOGY [facility])    [address]  [place] [address]   [phone]           No prep            2017 11:45 AM   Follow Up Visit with Ara Whittaker M.D.   [facility] ([address] [place] [address])    [address] [facility]  [place] [id number] [phone]           You will be receiving a confirmation call a few days before your appointment from our automated call confirmation system.            2017  1:00 PM   FOLLOW UP with James Mendoza M.D.   [facility] (--)    [address], [address]  [place] [id number] [phone]            2017  2:00 PM   Pulmonary Function Test with PFT-RM2   [facility] (--)    [address]  [address]  [place] [id number] [phone]            2017  3:00 PM   Established Patient Pul with ILAN Valdes   [facility] (--)    [address]  [address]  [place] [id number] [phone]              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   12/14/2012 - Present    Kidney damage S37.009A   1/29/2013 - Present    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   7/2/2013 - Present    Elevated coronary artery calcium score R93.1 High  2/20/2014 - Present    End stage renal disease (HCC) N18.6   3/24/2014 - Present    Dyslipidemia E78.5 Low  12/5/2014 - Present    Pigmented skin lesion L81.9   3/18/2015 - Present    Basal cell carcinoma of left cheek C44.319   3/26/2015 - Present    Anemia in CKD (chronic kidney disease) N18.9, D63.1   5/29/2015 - Present    Leg pain, bilateral M79.604, M79.605   8/10/2015 - Present    Primary osteoarthritis of both hands M19.041, M19.042   8/20/2015 - Present    Chronic gout M1A.9XX0   1/7/2016 - Present    Respiratory failure (CMS-HCC) J96.90   5/8/2016 - Present    Hyperkalemia, diminished renal excretion E87.5   5/8/2016 - Present    Pneumonia J18.9   5/8/2016 - Present    Pulmonary edema J81.1   5/8/2016 - Present    Anemia D64.9   5/8/2016 - Present    Leukocytosis D72.829   5/8/2016 - Present    COPD exacerbation (CMS-HCC) J44.1   5/8/2016 - Present    ESRD (end stage renal disease) on dialysis (CMS-HCC) N18.6, Z99.2   5/8/2016 - Present    Fluid overload E87.70   5/8/2016 - Present    Systolic CHF, acute (CMS-HCC) I50.21   5/11/2016 - Present    HTN (hypertension), malignant I10   5/11/2016 - Present    Bacteremia due to Staphylococcus aureus R78.81   5/11/2016 - Present    Hyperkalemia E87.5   5/11/2016 - Present    Renal cyst N28.1   5/11/2016 - Present    ESRD (end stage renal disease) (CMS-HCC)  N18.6   8/12/2016 - Present    Hypotension I95.9 Medium  8/13/2016 - Present    Need for prophylactic vaccination with combined vaccine Z23   9/8/2016 - Present    AVM (arteriovenous malformation) Q27.30   9/8/2016 - Present    Dyspnea on exertion R06.09   3/16/2017 - Present    Nocturnal hypoxemia G47.34   3/21/2017 - Present    Pulmonary hypertension (CMS-HCC) I27.2   3/21/2017 - Present    Pedal edema R60.0   3/21/2017 - Present    Acute neck pain M54.2   4/15/2017 - Present    Bacteremia due to Gram-positive bacteria A49.9   4/16/2017 - Present    Pericardial effusion I31.3 Medium  4/20/2017 - Present    Aortic valve sclerosis I35.8 Medium  4/20/2017 - Present    Cervical discitis M46.42   4/28/2017 - Present      Health Maintenance        Date Due Completion Dates    IMM DTaP/Tdap/Td Vaccine (1 - Tdap) 7/30/1961 ---    IMM ZOSTER VACCINE 7/30/2002 ---    IMM PNEUMOCOCCAL 65+ (ADULT) HIGH/HIGHEST RISK SERIES (2 of 2 - PCV13) 9/8/2017 9/8/2016    COLONOSCOPY 8/15/2026 8/15/2016, 9/16/2013            Current Immunizations     Influenza TIV (IM) 10/1/2013    Pneumococcal polysaccharide vaccine (PPSV-23) 9/8/2016      Below and/or attached are the medications your provider expects you to take. Review all of your home medications and newly ordered medications with your provider and/or pharmacist. Follow medication instructions as directed by your provider and/or pharmacist. Please keep your medication list with you and share with your provider. Update the information when medications are discontinued, doses are changed, or new medications (including over-the-counter products) are added; and carry medication information at all times in the event of emergency situations     Allergies:  No Known Allergies          Medications  Valid as of: May 16, 2017 - 12:06 PM    Generic Name Brand Name Tablet Size Instructions for use    AmLODIPine Besylate (Tab) NORVASC 10 MG TAKE ONE TABLET BY MOUTH DAILY        B Complex-C-Folic  Acid (Tab) DIALYVITE TABLET  TAKE ONE TABLET BY MOUTH DAILY        Budesonide-Formoterol Fumarate (Aerosol) SYMBICORT 160-4.5 MCG/ACT Inhale 2 Puffs by mouth 2 Times a Day. Inhale 2 puffs by mouth twice daily. Rinse mouth after each use.        Calcium Acetate (Phos Binder) (Cap) Calcium Acetate (Phos Binder) 667 MG TAKE TWO CAPSULES BY MOUTH THREE TIMES A DAY WITH A MEAL        Epoetin Mitchel (Solution) EPOGEN,PROCRIT 2000 UNIT/ML Inject 1 mL as instructed every Monday, Wednesday, and Friday.        Fluticasone Propionate (Suspension) FLONASE 50 MCG/ACT PLACE ONE TO TWO SPRAYS IN EACH NOSTRIL EVERY DAY        Furosemide (Tab) LASIX 40 MG TAKE TWO TABLETS BY MOUTH TWICE A DAY        Labetalol HCl (Tab) NORMODYNE 100 MG Take 5 Tabs by mouth 2 times a day.        Lisinopril (Tab) PRINIVIL, ZESTRIL 40 MG TAKE ONE TABLET BY MOUTH TWICE A DAY        Minoxidil (Tab) LONITEN 2.5 MG Take 2.5 mg by mouth every day.        Morphine Sulfate (Tab CR) MS CONTIN 30 MG Take 1 Tab by mouth every 12 hours.        Omeprazole (CAPSULE DELAYED RELEASE) PRILOSEC 40 MG TAKE ONE CAPSULE BY MOUTH DAILY (GENERIC FOR PRILOSEC)        Ondansetron (TABLET DISPERSIBLE) ZOFRAN ODT 4 MG DISSOLVE 1 TABLET BY MOUTH EVERY 6 HOURS AS NEEDED FOR NAUSEA        Oxycodone-Acetaminophen (Tab) PERCOCET 5-325 MG Take 1-2 Tabs by mouth every 6 hours as needed for Severe Pain.        Pravastatin Sodium (Tab) PRAVACHOL 10 MG Take 10 mg by mouth every evening.        Tamsulosin HCl (Cap) FLOMAX 0.4 MG TAKE TWO CAPSULES BY MOUTH DAILY 1/2 HOUR AFTER BREAKFAST        TraZODone HCl (Tab) DESYREL 150 MG TAKE TWO TABLETS BY MOUTH EVERY NIGHT AT BEDTIME (GENERIC FOR DESYREL)        Vancomycin HCl (MD ALERT... VANCOMYCIN) MD ALERT... vancomycin  1 Each by Other route pharmacy to dose.        .                 Medicines prescribed today were sent to:     [facility][id number] - [place], [place] - [address]    [address] [facility] [place] [address]    Phone:  [phone] Fax: [phone]    Open 24 Hours?: No      Medication refill instructions:       If your prescription bottle indicates you have medication refills left, it is not necessary to call your provider’s office. Please contact your pharmacy and they will refill your medication.    If your prescription bottle indicates you do not have any refills left, you may request refills at any time through one of the following ways: The online Cardback system (except Urgent Care), by calling your provider’s office, or by asking your pharmacy to contact your provider’s office with a refill request. Medication refills are processed only during regular business hours and may not be available until the next business day. Your provider may request additional information or to have a follow-up visit with you prior to refilling your medication.   *Please Note: Medication refills are assigned a new Rx number when refilled electronically. Your pharmacy may indicate that no refills were authorized even though a new prescription for the same medication is available at the pharmacy. Please request the medicine by name with the pharmacy before contacting your provider for a refill.           Cardback Access Code: [email or web address]  Expires: 6/15/2017 11:27 AM    Cardback  A secure, online tool to manage your health information     [employer]’s Cardback® is a secure, online tool that connects you to your personalized health information from the privacy of your home -- day or night - making it very easy for you to manage your healthcare. Once the activation process is completed, you can even access your medical information using the Cardback carmelo, which is available for free in the Apple Carmelo store or Google Play store.     Cardback provides the following levels of access (as shown below):   My Chart Features   [facility] Primary [facility] Doctor [facility]  Specialists [facility]  Urgent  Care Non-[facility]  Primary Care  Doctor   Email your healthcare team  securely and privately 24/7 X X X    Manage appointments: schedule your next appointment; view details of past/upcoming appointments X      Request prescription refills. X      View recent personal medical records, including lab and immunizations X X X X   View health record, including health history, allergies, medications X X X X   Read reports about your outpatient visits, procedures, consult and ER notes X X X X   See your discharge summary, which is a recap of your hospital and/or ER visit that includes your diagnosis, lab results, and care plan. X X       How to register for Met[employer]:  1. Go to  [email or web address].  2. Click on the Sign Up Now box, which takes you to the New Member Sign Up page. You will need to provide the following information:  a. Enter your Metacloud Access Code exactly as it appears at the top of this page. (You will not need to use this code after you’ve completed the sign-up process. If you do not sign up before the expiration date, you must request a new code.)   b. Enter your date of birth.   c. Enter your home email address.   d. Click Submit, and follow the next screen’s instructions.  3. Create a Metacloud ID. This will be your Metacloud login ID and cannot be changed, so think of one that is secure and easy to remember.  4. Create a Metacloud password. You can change your password at any time.  5. Enter your Password Reset Question and Answer. This can be used at a later time if you forget your password.   6. Enter your e-mail address. This allows you to receive e-mail notifications when new information is available in Metacloud.  7. Click Sign Up. You can now view your health information.    For assistance activating your Metacloud account, call [phone]

## 2017-05-16 NOTE — PROGRESS NOTES
Chief Complaint   Patient presents with   • Follow-Up     Follow up visit. Pt concerned about infection in neck.   Primary Care Provider: Pcp Pt States None    Infectious Disease clinic follow-up:  Patient is a 76 y.o. male in the clinic today for ID FU appointment. 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 His cultures were positive for strep viridans on  4/15/17. They were negative on 4/17/17. 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 He was discharged on IV vancomycin after hemodialysis through 6/12/17.   5/12/17- saw Rica Blair nurse practitioner. IV vancomycin was continued  5/16/17-  has come back for follow-up. Saw neurosurgery. They recommended PT. He is going to PT about 1 xweek. He is feeling better.        REVIEW OF SYSTEMS:    Constitutional: Negative for fever and malaise/fatigue.   HENT: Negative for hearing loss and visual changes   Eyes: Negative for blurred vision, double vision and photophobia.   Respiratory: Negative for cough.   Cardiovascular: Negative for chest pain and leg swelling.   Gastrointestinal: Negative for nausea, vomiting and diarrhea.   Musculoskeletal: Negative for myalgias and back pain. The neck pain is improving. But still significant.   Skin: Negative for rash.   Neurological: Negative for sensory change, focal weakness and headaches.     ALLERGIES:  No Known Allergies     PAST MEDICAL HISTORY:   Past Medical History   Diagnosis Date   • BPH 7/14/2009   • HTN (hypertension) 1/15/2010   • Snoring    • COPD (chronic obstructive pulmonary disease) (CMS-HCC)    • Proteinuria    • COPD (chronic obstructive pulmonary disease) (CMS-HCC) 8/2/2011   • EMPHYSEMA    • Detached retina    • CKD (chronic kidney disease) stage 4, GFR 15-29 ml/min (CMS-HCC) 1/15/2010     end stage renal disease   • Kidney cyst  "   • On supplemental oxygen therapy    • Heart burn      occas   • Indigestion      occas   • Bronchitis 1/1/13   • CAD (coronary artery disease) 2/20/2014   • Sleep apnea      O2 PER CANULA  AT NIGHT 2l/m   • Dialysis      m,w,f juliann   • CATARACT      sanjuanita surgery complete   • Basal cell carcinoma of left cheek 3/26/2015   • Leg pain, bilateral 8/10/2015   • Gout    • Pneumonia    • Anesthesia      \"needs more sedation\" (can sometimes hear MD during procedure)    • High cholesterol        PAST SURGICAL HISTORY:    Past Surgical History   Procedure Laterality Date   • Cataract phaco with iol  4/8/08     Performed by STACEY PHILLIPS at SURGERY SAME DAY [facility] ORS   • Av fistula creation  2/12/2010     Performed by ALESHIA MOSCOSO at SURGERY [facility]   • Av fistula revision  2/19/2010     Performed by WILLIE HATCH at SURGERY [facility] ORS   • Av fistulogram  7/23/2010     Performed by ALESHIA MOSCOSO at SURGERY [facility] ORS   • Angioplasty balloon  7/23/2010     Performed by ALESHIA MOSCOSO at SURGERY [facility] ORS   • Av fistulogram  9/17/2010     Performed by ALESHIA MOSCOSO at SURGERY [facility] ORS   • Angioplasty balloon  9/17/2010     Performed by ALESHIA MOSCOSO at SURGERY [facility] ORS   • Vitrectomy posterior  1/18/2011     Performed by NAHUM GE at SURGERY SAME DAY [facility] ORS   • Scleral buckling  1/18/2011     Performed by NAHUM GE at SURGERY SAME DAY [facility] ORS   • Recovery  8/12/2011     Performed by SURGERY, IR-RECOVERY at SURGERY SAME DAY [facility] ORS   • Vitrectomy posterior  10/11/2011     Performed by NAHUM GE at SURGERY SAME DAY [facility] ORS   • Recovery  7/23/2012     Performed by SURGERY, IR-RECOVERY at SURGERY SAME DAY [facility] ORS   • Recovery  1/29/2013     Performed by Ir-Recovery Surgery at SURGERY SAME DAY [facility] ORS   • Recovery  7/2/2013     Performed by Ir-Recovery Surgery at SURGERY SAME DAY [facility] ORS   • Av " fistula thrombolysis  7/2/2013     Performed by David Cason M.D. at SURGERY [facility]   • Recovery  12/17/2013     Performed by Ir-Recovery Surgery at SURGERY SAME DAY [facility] ORS   • Recovery  3/24/2014     Performed by Ir-Recovery Surgery at SURGERY [facility] ORS   • Recovery  7/29/2014     Performed by Ir-Recovery Surgery at SURGERY SAME DAY [facility] ORS   • Recovery  3/24/2015     Performed by Recoveryonly Surgery at SURGERY PRE-POST PROC UNIT [facility]   • Recovery  12/23/2015     Procedure: VASCULAR CASE-CASON-LEFT ARM FISTULOGRAM WITH ANGIOPLASTY;  Surgeon: Recoveryonbhavani Surgery;  Location: SURGERY PRE-POST PROC UNIT [facility];  Service:    • Gastroscopy with biopsy  8/13/2016     Procedure: GASTROSCOPY WITH BIOPSY;  Surgeon: Jorge Leavitt M.D.;  Location: ENDOSCOPY [facility];  Service:    • Colonoscopy - endo  8/15/2016     Procedure: COLONOSCOPY - ENDO;  Surgeon: Mane Whatley M.D.;  Location: ENDOSCOPY [facility];  Service:         MEDICATIONS:   Current Outpatient Prescriptions   Medication Sig Dispense Refill   • oxycodone-acetaminophen (PERCOCET) 5-325 MG Tab Take 1-2 Tabs by mouth every 6 hours as needed for Severe Pain. 20 Tab 0   • trazodone (DESYREL) 150 MG Tab TAKE TWO TABLETS BY MOUTH EVERY NIGHT AT BEDTIME (GENERIC FOR DESYREL) 180 Tab 4   • ondansetron (ZOFRAN ODT) 4 MG TABLET DISPERSIBLE DISSOLVE 1 TABLET BY MOUTH EVERY 6 HOURS AS NEEDED FOR NAUSEA 30 Tab 4   • B Complex-C-Folic Acid (DIALYVITE TABLET) Tab TAKE ONE TABLET BY MOUTH DAILY 90 Tab 4   • tamsulosin (FLOMAX) 0.4 MG capsule TAKE TWO CAPSULES BY MOUTH DAILY 1/2 HOUR AFTER BREAKFAST 180 Cap 11   • Calcium Acetate, Phos Binder, 667 MG Cap TAKE TWO CAPSULES BY MOUTH THREE TIMES A DAY WITH A MEAL 180 Cap 11   • labetalol (NORMODYNE) 100 MG Tab Take 5 Tabs by mouth 2 times a day. 300 Tab 6   • fluticasone (FLONASE) 50 MCG/ACT nasal spray PLACE ONE TO TWO SPRAYS IN EACH NOSTRIL EVERY DAY 1 Bottle 5   • omeprazole  "(PRILOSEC) 40 MG delayed-release capsule TAKE ONE CAPSULE BY MOUTH DAILY (GENERIC FOR PRILOSEC) 90 Cap 4   • pravastatin (PRAVACHOL) 10 MG Tab Take 10 mg by mouth every evening.     • minoxidil (LONITEN) 2.5 MG Tab Take 2.5 mg by mouth every day.     • amlodipine (NORVASC) 10 MG Tab TAKE ONE TABLET BY MOUTH DAILY 90 Tab 4   • budesonide-formoterol (SYMBICORT) 160-4.5 MCG/ACT Aerosol Inhale 2 Puffs by mouth 2 Times a Day. Inhale 2 puffs by mouth twice daily. Rinse mouth after each use. 1 Inhaler 12   • lisinopril (PRINIVIL, ZESTRIL) 40 MG tablet TAKE ONE TABLET BY MOUTH TWICE A DAY 60 Tab 11   • furosemide (LASIX) 40 MG Tab TAKE TWO TABLETS BY MOUTH TWICE A  Tab 9   • Vancomycin HCl (MD ALERT... VANCOMYCIN) 1 Each by Other route pharmacy to dose. 1 Each 25   • morphine ER (MS CONTIN) 30 MG Tab CR tablet Take 1 Tab by mouth every 12 hours. (Patient not taking: Reported on 4/28/2017) 30 Tab 0   • epoetin hilario (EPOGEN,PROCRIT) 2000 UNIT/ML Solution Inject 1 mL as instructed every Monday, Wednesday, and Friday. 1 mL 0     No current facility-administered medications for this visit.        LABORATORY DATA:       PHYSICAL EXAMINATION:PE:      /66 mmHg  Pulse 75  Temp(Src) 37.2 °C (99 °F)  Resp 16  Ht 1.74 m (5' 8.5\")  Wt 75.297 kg (166 lb)  BMI 24.87 kg/m2  SpO2 95%       Constitutional: patient is oriented to person, place, and time. He appears well-developed and well-nourished. No distress  Eyes: Conjunctivae normal and EOM are normal. Pupils are equal, round, and reactive to light.   Mouth/Throat: Lips without lesions, good dentition, oropharynx is clear and moist.  Neck: Neck supple. No masses. Slight tenderness on palpation  Cardiovascular: Normal rate, regular rhythm plus murmur heard  Pulmonary/Chest: No respiratory distress. Unlabored respiratory effort, lungs clear to auscultation. No wheezes or rales.   Abdominal: Soft, non tender. BS + x 4. No masses or hepatosplenomegaly.   Musculoskeletal: " Normal range of motion. No tenderness, swelling, erythema, deformity noted.  Neurological: He is alert and oriented to person, place, and time. No cranial nerve deficit. Coordination normal.   Skin: Skin is warm and dry. Good turgor. No rashes visable.  Psychiatric: He has a normal mood and affect. His behavior is normal.        ASSESSMENT:       1. Streptococcus viridans infection     2. Cervical discitis     3. ESRD (end stage renal disease) on dialysis (CMS-[facility])     4. Pericardial effusion       RECOMMENDATIONS:    I had a long talk with the patient. Advised him to get a repeat MRI of his C-spine. He says he cannot do a repeat MRI cause of his claustrophobia. We tried to see if there is an open MRI- there is none in this area. I will extend his IV Vanco during dialysis 6/26/2017. I will communicate that with Dr. Pepe  Patient is adamantly refusing his MRI.   He understands that without MRI we will not know if his C-spine has completely resolved or not  We will see him back in 4 weeks  No Follow-up on file.

## 2017-05-29 ENCOUNTER — HOSPITAL ENCOUNTER (OUTPATIENT)
Dept: CARDIOLOGY | Facility: MEDICAL CENTER | Age: 77
End: 2017-05-29
Attending: NURSE PRACTITIONER
Payer: MEDICARE

## 2017-05-29 DIAGNOSIS — I31.39 PERICARDIAL EFFUSION: ICD-10-CM

## 2017-05-29 PROCEDURE — 93325 DOPPLER ECHO COLOR FLOW MAPG: CPT

## 2017-05-29 PROCEDURE — 93321 DOPPLER ECHO F-UP/LMTD STD: CPT

## 2017-05-29 PROCEDURE — 93308 TTE F-UP OR LMTD: CPT

## 2017-05-30 ENCOUNTER — TELEPHONE (OUTPATIENT)
Dept: CARDIOLOGY | Facility: MEDICAL CENTER | Age: 77
End: 2017-05-30

## 2017-05-30 DIAGNOSIS — M79.603 PAIN OF UPPER EXTREMITY, UNSPECIFIED LATERALITY: ICD-10-CM

## 2017-05-30 LAB — LV EJECT FRACT  99904: 60

## 2017-05-30 RX ORDER — LIDOCAINE 50 MG/G
1 PATCH TOPICAL EVERY 24 HOURS
Qty: 30 PATCH | Refills: 11 | Status: ON HOLD | OUTPATIENT
Start: 2017-05-30 | End: 2017-07-25

## 2017-05-30 NOTE — TELEPHONE ENCOUNTER
----- Message from ILAN Nur sent at 5/30/2017 12:55 PM PDT -----  Pericardial effusion is slighly increased. If any shortness of breath, fatigue, orthopnea, or chest pain, Please report to ER.     Follow up 6/27 as planned.    Thanks,  RT

## 2017-06-02 ENCOUNTER — OFFICE VISIT (OUTPATIENT)
Dept: RHEUMATOLOGY | Facility: PHYSICIAN GROUP | Age: 77
End: 2017-06-02
Payer: MEDICARE

## 2017-06-02 VITALS
DIASTOLIC BLOOD PRESSURE: 60 MMHG | HEART RATE: 74 BPM | OXYGEN SATURATION: 89 % | BODY MASS INDEX: 24.57 KG/M2 | WEIGHT: 164 LBS | TEMPERATURE: 99.5 F | SYSTOLIC BLOOD PRESSURE: 150 MMHG | RESPIRATION RATE: 14 BRPM

## 2017-06-02 DIAGNOSIS — E78.5 HYPERLIPIDEMIA, UNSPECIFIED HYPERLIPIDEMIA TYPE: ICD-10-CM

## 2017-06-02 DIAGNOSIS — I10 UNSPECIFIED ESSENTIAL HYPERTENSION: Primary | ICD-10-CM

## 2017-06-02 DIAGNOSIS — R25.2 SPASM: ICD-10-CM

## 2017-06-02 DIAGNOSIS — M54.2 CERVICALGIA: ICD-10-CM

## 2017-06-02 DIAGNOSIS — N18.6 ESRD (END STAGE RENAL DISEASE) (HCC): ICD-10-CM

## 2017-06-02 DIAGNOSIS — G56.03 CARPAL TUNNEL SYNDROME ON BOTH SIDES: ICD-10-CM

## 2017-06-02 DIAGNOSIS — M1A.00X0 IDIOPATHIC CHRONIC GOUT WITHOUT TOPHUS, UNSPECIFIED SITE: ICD-10-CM

## 2017-06-02 DIAGNOSIS — I10 HTN (HYPERTENSION), MALIGNANT: ICD-10-CM

## 2017-06-02 DIAGNOSIS — M19.041 PRIMARY OSTEOARTHRITIS OF BOTH HANDS: ICD-10-CM

## 2017-06-02 DIAGNOSIS — M19.042 PRIMARY OSTEOARTHRITIS OF BOTH HANDS: ICD-10-CM

## 2017-06-02 DIAGNOSIS — I31.39 PERICARDIAL EFFUSION: ICD-10-CM

## 2017-06-02 PROCEDURE — 1101F PT FALLS ASSESS-DOCD LE1/YR: CPT | Performed by: INTERNAL MEDICINE

## 2017-06-02 PROCEDURE — G8598 ASA/ANTIPLAT THER USED: HCPCS | Performed by: INTERNAL MEDICINE

## 2017-06-02 PROCEDURE — 99214 OFFICE O/P EST MOD 30 MIN: CPT | Mod: 25 | Performed by: INTERNAL MEDICINE

## 2017-06-02 PROCEDURE — G8420 CALC BMI NORM PARAMETERS: HCPCS | Performed by: INTERNAL MEDICINE

## 2017-06-02 PROCEDURE — G8432 DEP SCR NOT DOC, RNG: HCPCS | Performed by: INTERNAL MEDICINE

## 2017-06-02 PROCEDURE — 3017F COLORECTAL CA SCREEN DOC REV: CPT | Performed by: INTERNAL MEDICINE

## 2017-06-02 PROCEDURE — 1036F TOBACCO NON-USER: CPT | Performed by: INTERNAL MEDICINE

## 2017-06-02 PROCEDURE — 4040F PNEUMOC VAC/ADMIN/RCVD: CPT | Performed by: INTERNAL MEDICINE

## 2017-06-02 PROCEDURE — 20600 DRAIN/INJ JOINT/BURSA W/O US: CPT | Performed by: INTERNAL MEDICINE

## 2017-06-02 RX ORDER — METHYLPREDNISOLONE ACETATE 40 MG/ML
40 INJECTION, SUSPENSION INTRA-ARTICULAR; INTRALESIONAL; INTRAMUSCULAR; SOFT TISSUE ONCE
Status: COMPLETED | OUTPATIENT
Start: 2017-06-02 | End: 2017-06-02

## 2017-06-02 RX ORDER — TIZANIDINE 2 MG/1
TABLET ORAL
Qty: 60 TAB | Refills: 0 | Status: SHIPPED | OUTPATIENT
Start: 2017-06-02 | End: 2017-07-03 | Stop reason: SDUPTHER

## 2017-06-02 RX ADMIN — METHYLPREDNISOLONE ACETATE 5 MG: 40 INJECTION, SUSPENSION INTRA-ARTICULAR; INTRALESIONAL; INTRAMUSCULAR; SOFT TISSUE at 10:16

## 2017-06-02 NOTE — MR AVS SNAPSHOT
Parmjit Castelan   2017 8:45 AM   Office Visit   MRN: [id number]    Department:  Rheumatology [facility]   Dept Phone:  [phone]    Description:  Male : 1940   Provider:  Shauna Lorenzo M.D.           Reason for Visit     Follow-Up           Allergies as of 2017     No Known Allergies      You were diagnosed with     Idiopathic chronic gout without tophus, unspecified site   [5867526]       Primary osteoarthritis of both hands   [6817859]       Cervicalgia   [723.1.ICD-9-CM]       Spasm   [422550]         Vital Signs     Blood Pressure Pulse Temperature Respirations Weight Oxygen Saturation    150/60 mmHg 74 37.5 °C (99.5 °F) 14 74.39 kg (164 lb) 89%    Smoking Status                   Former Smoker           Basic Information     Date Of Birth Sex Race Ethnicity Preferred Language    1940 Male White Non- English      Your appointments     2017 11:45 AM   Follow Up Visit with Ara Whittaker M.D.   [facility] ([address] [place] [address])    [address]  [place] [address] [phone]           You will be receiving a confirmation call a few days before your appointment from our automated call confirmation system.            2017  1:00 PM   FOLLOW UP with James Mendoza M.D.   [facility] (--)    [address], [address]  [place] [address] [phone]            2017  2:00 PM   Pulmonary Function Test with PFT-RM2   [facility] (--)    [address]  [address]  [place] [id number] [phone]            2017  3:00 PM   Established Patient Pul with ILAN Valdse   [facility] (--)    [address]  [address]  [place] [id number] [phone]            Oct 03, 2017  1:00 PM   Follow Up Visit with Shauna Lorenzo M.D.   [facility] (--)    [address], [address]  [place] [id number] [phone]          You will be receiving a confirmation call a few days before your appointment from our automated call confirmation system.              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   12/14/2012 - Present    Kidney damage S37.009A   1/29/2013 - Present    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   7/2/2013 - Present    Elevated coronary artery calcium score R93.1 High  2/20/2014 - Present    End stage renal disease (HCC) N18.6   3/24/2014 - Present    Dyslipidemia E78.5 Low  12/5/2014 - Present    Pigmented skin lesion L81.9   3/18/2015 - Present    Basal cell carcinoma of left cheek C44.319   3/26/2015 - Present    Anemia in CKD (chronic kidney disease) N18.9, D63.1   5/29/2015 - Present    Leg pain, bilateral M79.604, M79.605   8/10/2015 - Present    Primary osteoarthritis of both hands M19.041, M19.042   8/20/2015 - Present    Chronic gout M1A.9XX0   1/7/2016 - Present    Respiratory failure (CMS-HCC) J96.90   5/8/2016 - Present    Hyperkalemia, diminished renal excretion E87.5   5/8/2016 - Present    Pneumonia J18.9   5/8/2016 - Present    Pulmonary edema J81.1   5/8/2016 - Present    Anemia D64.9   5/8/2016 - Present    Leukocytosis D72.829   5/8/2016 - Present    COPD exacerbation (CMS-HCC) J44.1   5/8/2016 - Present    ESRD (end stage renal disease) on dialysis (CMS-HCC) N18.6, Z99.2   5/8/2016 - Present    Fluid overload E87.70   5/8/2016 - Present    Systolic CHF, acute (CMS-HCC) I50.21    5/11/2016 - Present    HTN (hypertension), malignant I10   5/11/2016 - Present    Bacteremia due to Staphylococcus aureus R78.81   5/11/2016 - Present    Hyperkalemia E87.5   5/11/2016 - Present    Renal cyst N28.1   5/11/2016 - Present    ESRD (end stage renal disease) (CMS-HCC) N18.6   8/12/2016 - Present    Hypotension I95.9 Medium  8/13/2016 - Present    Need for prophylactic vaccination with combined vaccine Z23   9/8/2016 - Present    AVM (arteriovenous malformation) Q27.30   9/8/2016 - Present    Dyspnea on exertion R06.09   3/16/2017 - Present    Nocturnal hypoxemia G47.34   3/21/2017 - Present    Pulmonary hypertension (CMS-HCC) I27.2   3/21/2017 - Present    Pedal edema R60.0   3/21/2017 - Present    Acute neck pain M54.2   4/15/2017 - Present    Bacteremia due to Gram-positive bacteria A49.9   4/16/2017 - Present    Pericardial effusion I31.3 Medium  4/20/2017 - Present    Aortic valve sclerosis I35.8 Medium  4/20/2017 - Present    Cervical discitis M46.42   4/28/2017 - Present      Health Maintenance        Date Due Completion Dates    IMM DTaP/Tdap/Td Vaccine (1 - Tdap) 7/30/1961 ---    IMM ZOSTER VACCINE 7/30/2002 ---    IMM PNEUMOCOCCAL 65+ (ADULT) HIGH/HIGHEST RISK SERIES (2 of 2 - PCV13) 9/8/2017 9/8/2016    COLONOSCOPY 8/15/2026 8/15/2016, 9/16/2013            Current Immunizations     Influenza TIV (IM) 10/1/2013    Pneumococcal polysaccharide vaccine (PPSV-23) 9/8/2016      Below and/or attached are the medications your provider expects you to take. Review all of your home medications and newly ordered medications with your provider and/or pharmacist. Follow medication instructions as directed by your provider and/or pharmacist. Please keep your medication list with you and share with your provider. Update the information when medications are discontinued, doses are changed, or new medications (including over-the-counter products) are added; and carry medication information at all times in the  event of emergency situations     Allergies:  No Known Allergies          Medications  Valid as of: June 02, 2017 -  9:00 AM    Generic Name Brand Name Tablet Size Instructions for use    AmLODIPine Besylate (Tab) NORVASC 10 MG TAKE ONE TABLET BY MOUTH DAILY        B Complex-C-Folic Acid (Tab) DIALYVITE TABLET  TAKE ONE TABLET BY MOUTH DAILY        Budesonide-Formoterol Fumarate (Aerosol) SYMBICORT 160-4.5 MCG/ACT Inhale 2 Puffs by mouth 2 Times a Day. Inhale 2 puffs by mouth twice daily. Rinse mouth after each use.        Calcium Acetate (Phos Binder) (Cap) Calcium Acetate (Phos Binder) 667 MG TAKE TWO CAPSULES BY MOUTH THREE TIMES A DAY WITH A MEAL        Epoetin Mitchel (Solution) EPOGEN,PROCRIT 2000 UNIT/ML Inject 1 mL as instructed every Monday, Wednesday, and Friday.        Fluticasone Propionate (Suspension) FLONASE 50 MCG/ACT PLACE ONE TO TWO SPRAYS IN EACH NOSTRIL EVERY DAY        Furosemide (Tab) LASIX 40 MG TAKE TWO TABLETS BY MOUTH TWICE A DAY        Labetalol HCl (Tab) NORMODYNE 100 MG Take 5 Tabs by mouth 2 times a day.        Lidocaine (Patch) LIDODERM 5 % Apply 1 Patch to skin as directed every 24 hours.        Lisinopril (Tab) PRINIVIL, ZESTRIL 40 MG TAKE ONE TABLET BY MOUTH TWICE A DAY        Morphine Sulfate (Tab CR) MS CONTIN 30 MG Take 1 Tab by mouth every 12 hours.        Omeprazole (CAPSULE DELAYED RELEASE) PRILOSEC 40 MG TAKE ONE CAPSULE BY MOUTH DAILY (GENERIC FOR PRILOSEC)        Ondansetron (TABLET DISPERSIBLE) ZOFRAN ODT 4 MG DISSOLVE 1 TABLET BY MOUTH EVERY 6 HOURS AS NEEDED FOR NAUSEA        Oxycodone-Acetaminophen (Tab) PERCOCET 5-325 MG Take 1-2 Tabs by mouth every 6 hours as needed for Severe Pain.        Pravastatin Sodium (Tab) PRAVACHOL 10 MG Take 10 mg by mouth every evening.        Tamsulosin HCl (Cap) FLOMAX 0.4 MG TAKE TWO CAPSULES BY MOUTH DAILY 1/2 HOUR AFTER BREAKFAST        TiZANidine HCl (Tab) ZANAFLEX 2 MG 1-2 tabs po qhs for neck muscle spasm        TraZODone HCl (Tab)  DESYREL 150 MG TAKE TWO TABLETS BY MOUTH EVERY NIGHT AT BEDTIME (GENERIC FOR DESYREL)        Vancomycin HCl (MD ALERT... VANCOMYCIN) MD ALERT... vancomycin  1 Each by Other route pharmacy to dose.        .                 Medicines prescribed today were sent to:     [facility][id number] - [place], [place] - [address]    [address] [place] [address]    Phone: [phone] Fax: [phone]    Open 24 Hours?: No      Medication refill instructions:       If your prescription bottle indicates you have medication refills left, it is not necessary to call your provider’s office. Please contact your pharmacy and they will refill your medication.    If your prescription bottle indicates you do not have any refills left, you may request refills at any time through one of the following ways: The online [employer] system (except Urgent Care), by calling your provider’s office, or by asking your pharmacy to contact your provider’s office with a refill request. Medication refills are processed only during regular business hours and may not be available until the next business day. Your provider may request additional information or to have a follow-up visit with you prior to refilling your medication.   *Please Note: Medication refills are assigned a new Rx number when refilled electronically. Your pharmacy may indicate that no refills were authorized even though a new prescription for the same medication is available at the pharmacy. Please request the medicine by name with the pharmacy before contacting your provider for a refill.           Seedcamp Access Code: [email or web address]  Expires: 6/15/2017 11:27 AM    [employer]edcamp  A secure, online tool to manage your health information     [employer]’s Seedcamp® is a secure, online tool that connects you to your personalized health information from the privacy of your home -- day or night - making it very easy for you to manage your healthcare. Once the activation  process is completed, you can even access your medical information using the [employer] carmelo, which is available for free in the Apple Carmelo store or Google Play store.     [employer] provides the following levels of access (as shown below):   My Chart Features   [facility] Primary [facility] Doctor [facility]  Specialists [facility]  Urgent  Care Non-[facility]  Primary Care  Doctor   Email your healthcare team securely and privately 24/7 X X X    Manage appointments: schedule your next appointment; view details of past/upcoming appointments X      Request prescription refills. X      View recent personal medical records, including lab and immunizations X X X X   View health record, including health history, allergies, medications X X X X   Read reports about your outpatient visits, procedures, consult and ER notes X X X X   See your discharge summary, which is a recap of your hospital and/or ER visit that includes your diagnosis, lab results, and care plan. X X       How to register for [employer]:  1. Go to  [email or web address].  2. Click on the Sign Up Now box, which takes you to the New Member Sign Up page. You will need to provide the following information:  a. Enter your [employer] Access Code exactly as it appears at the top of this page. (You will not need to use this code after you’ve completed the sign-up process. If you do not sign up before the expiration date, you must request a new code.)   b. Enter your date of birth.   c. Enter your home email address.   d. Click Submit, and follow the next screen’s instructions.  3. Create a [employer]Sourced ID. This will be your [employer] login ID and cannot be changed, so think of one that is secure and easy to remember.  4. Create a [employer] password. You can change your password at any time.  5. Enter your Password Reset Question and Answer. This can be used at a later time if you forget your password.   6. Enter your e-mail address. This allows you to receive e-mail notifications when new information  is available in [employer].  7. Click Sign Up. You can now view your health information.    For assistance activating your [employer] account, call [phone]

## 2017-06-02 NOTE — Clinical Note
[facility]   [address], [address]  [place] [address]  Phone: [phone]  Fax: [phone]              Encounter Date: 6/2/2017    Dear Dr. Echavarria ref. provider found,    It was a pleasure seeing your patient, Parmjit Castelan, on 6/2/2017. Diagnoses of Idiopathic chronic gout without tophus, unspecified site, Primary osteoarthritis of both hands, Carpal tunnel syndrome on both sides, Cervicalgia, Spasm, ESRD (end stage renal disease) (CMS-HCC), HTN (hypertension), malignant, and Pericardial effusion were pertinent to this visit.     Please find attached progress note which includes the history I obtained from Mr. Castelan, my physical examination findings, my impression and recommendations.      Once again, it was a pleasure participating in your patient's care.  Please feel free to contact me if you have any questions or if I can be of any further assistance to your patients.      Sincerely,    Shauna Lorenzo M.D.  Electronically Signed          PROGRESS NOTE:  No notes on file

## 2017-06-02 NOTE — PROGRESS NOTES
Chief Complaint- joint pain    Subjective:   Parmjit Castelan is a 76 y.o. male here today for follow up of rheumatological issues    Patient here to followup bilateral hand pain, status post x-ray indicating DJD, status post evaluation with serologies all which were negative, status post EMG/MCV indicating moderate to severe carpal tunnel syndrome, status post surgical release on the right hand without benefit, patient still with symptoms, patient also has symptoms in left hand but does not want to pursue any surgical release.  Patient with renal failure on dialysis 3 times a week and is unable to take NSAIDs, currently takes hydrocodone when necessary which did help at the beginning but patient states is no one helping.  Patient also with coronary artery disease, recently status post a left arm renal dialysis shunt revision, also with COPD and obstructive sleep apnea. Patient also with polycystic kidney disease, on dialysis, patient also is sleep apnea and uses a nighttime oxygen, unable to use a C Pap machine. Patient here complaining of pain in his right third PIP joint, wonders about getting a cortisone injection. Since last visit patient developed osteomyelitis in the C-spine currently on vancomycin antibiotics.    S/p Uloric-unable to afford  S/p allopurinol-felt to cause HTN    Hep B neg 5/2009  Uric Acid 7.1 normal 6/2015  RF neg 6/2015  CCP neg 6/2015  AWILDA neg 5/2009  C3 90 normal 5/2009  C4 19 normal 5/2009  G6PD 12.0 adequate 1/2016  Hand X-rays done 6/2015 indicates DJD   EMG/NCV Right hand 8/2016-indicates moderate-severe CTS     Current medicines (including changes today)  Current Outpatient Prescriptions   Medication Sig Dispense Refill   • tizanidine (ZANAFLEX) 2 MG tablet 1-2 tabs po qhs for neck muscle spasm 60 Tab 0   • Vancomycin HCl (MD ALERT... VANCOMYCIN) 1 Each by Other route pharmacy to dose. 1 Each 25   • trazodone (DESYREL) 150 MG Tab TAKE TWO TABLETS BY MOUTH EVERY NIGHT AT BEDTIME  (GENERIC FOR DESYREL) 180 Tab 4   • B Complex-C-Folic Acid (DIALYVITE TABLET) Tab TAKE ONE TABLET BY MOUTH DAILY 90 Tab 4   • tamsulosin (FLOMAX) 0.4 MG capsule TAKE TWO CAPSULES BY MOUTH DAILY 1/2 HOUR AFTER BREAKFAST 180 Cap 11   • Calcium Acetate, Phos Binder, 667 MG Cap TAKE TWO CAPSULES BY MOUTH THREE TIMES A DAY WITH A MEAL 180 Cap 11   • labetalol (NORMODYNE) 100 MG Tab Take 5 Tabs by mouth 2 times a day. 300 Tab 6   • fluticasone (FLONASE) 50 MCG/ACT nasal spray PLACE ONE TO TWO SPRAYS IN EACH NOSTRIL EVERY DAY 1 Bottle 5   • omeprazole (PRILOSEC) 40 MG delayed-release capsule TAKE ONE CAPSULE BY MOUTH DAILY (GENERIC FOR PRILOSEC) 90 Cap 4   • epoetin hilario (EPOGEN,PROCRIT) 2000 UNIT/ML Solution Inject 1 mL as instructed every Monday, Wednesday, and Friday. 1 mL 0   • pravastatin (PRAVACHOL) 10 MG Tab Take 10 mg by mouth every evening.     • amlodipine (NORVASC) 10 MG Tab TAKE ONE TABLET BY MOUTH DAILY 90 Tab 4   • budesonide-formoterol (SYMBICORT) 160-4.5 MCG/ACT Aerosol Inhale 2 Puffs by mouth 2 Times a Day. Inhale 2 puffs by mouth twice daily. Rinse mouth after each use. 1 Inhaler 12   • lisinopril (PRINIVIL, ZESTRIL) 40 MG tablet TAKE ONE TABLET BY MOUTH TWICE A DAY 60 Tab 11   • furosemide (LASIX) 40 MG Tab TAKE TWO TABLETS BY MOUTH TWICE A  Tab 9   • lidocaine (LIDODERM) 5 % Patch Apply 1 Patch to skin as directed every 24 hours. 30 Patch 11   • morphine ER (MS CONTIN) 30 MG Tab CR tablet Take 1 Tab by mouth every 12 hours. (Patient not taking: Reported on 4/28/2017) 30 Tab 0   • oxycodone-acetaminophen (PERCOCET) 5-325 MG Tab Take 1-2 Tabs by mouth every 6 hours as needed for Severe Pain. 20 Tab 0   • ondansetron (ZOFRAN ODT) 4 MG TABLET DISPERSIBLE DISSOLVE 1 TABLET BY MOUTH EVERY 6 HOURS AS NEEDED FOR NAUSEA 30 Tab 4     No current facility-administered medications for this visit.     He  has a past medical history of BPH (7/14/2009); HTN (hypertension) (1/15/2010); Snoring; COPD (chronic  obstructive pulmonary disease) (CMS-HCC); Proteinuria; COPD (chronic obstructive pulmonary disease) (CMS-HCC) (8/2/2011); EMPHYSEMA; Detached retina; CKD (chronic kidney disease) stage 4, GFR 15-29 ml/min (CMS-HCC) (1/15/2010); Kidney cyst; On supplemental oxygen therapy; Heart burn; Indigestion; Bronchitis (1/1/13); CAD (coronary artery disease) (2/20/2014); Sleep apnea; Dialysis; CATARACT; Basal cell carcinoma of left cheek (3/26/2015); Leg pain, bilateral (8/10/2015); Gout; Pneumonia; Anesthesia; and High cholesterol. He also has no past medical history of Liver disease.    ROS   Other than what is mentioned in HPI or physical exam, there is no history of headaches, double vision or blurred vision. No temporal tenderness or jaw claudication. No history of cataracts or glaucoma. No trouble swallowing difficulties or sore throats. No history of thyroid disease. No chest complaints including chest pain, cough or sputum production. No shortness of breath. No GI complaints including nausea, vomiting, change in bowel habits, or past peptic ulcer disease. No history of blood in the stools. No urinary complaints including dysuria or frequency. No history of rash including psoriasis. No history of alopecia, photosensitivity, oral ulcerations, Raynaud's phenomena, or swollen joints. No history of gout. No back complaints. No history of low blood counts.       Objective:     Blood pressure 150/60, pulse 74, temperature 37.5 °C (99.5 °F), resp. rate 14, weight 74.39 kg (164 lb), SpO2 89 %. Body mass index is 24.57 kg/(m^2).   Physical Exam:    General:Alert, oriented X 3 in no acute distress.Eye contact is good, speech goal directed, affect calmHEENT: conjunctiva non-injected, sclera non-icteric.Pinna normal. TM pearly gray. Oral mucous membranes pink and moist with no lesions.Neck: No thyromegaly, trachea midline Lymph: No supraclavicular lymphadenopathy, no cervical lymphadenopathy, no axillary lymphadenopathyLungs: clear  to auscultation bilaterally with good excursion.CV: regular rate and rhythm.Abdomen: soft, nontender, No CVAT, no HSMNeuro: Cranial nerves 2-12 are grossly intactSkin: No discoid lesions, no petechial lesions, no malar rash, no vasculitic lesionsExt: No swan-neck or boutonniere deformities, no sausage digits, no digital tip ulcerations, no ulnar deviation, no ulnar styloid process prominence, patient is able to make full fists but feels stiffness with movement. There is tenderness to palpation on the right third PIP joint and right third MCP joint, more tender on the right third PIP joint. No evidence of thenar or hyperthenar eminence atrophy Patient has a renal dialysis shunt in the left arm.  right hand radial and ulnar pulses are 2+ and symmetrical, there is positive phalen sign bilaterally. Patient also with ena c spine tenderness to palpation, no step-offs are appreciated.     Lab Results   Component Value Date/Time    HEPATITIS B CORS AB,IGM Negative 08/12/2016 05:40 PM    HEPATITIS B SURFACE ANTIGEN Negative 04/16/2017 04:17 AM     Lab Results   Component Value Date/Time    HEPATITIS C ANTIBODY Negative 08/12/2016 05:40 PM     Lab Results   Component Value Date/Time    SODIUM 138 04/20/2017 04:19 AM    POTASSIUM 4.7 04/20/2017 04:19 AM    CHLORIDE 95* 04/20/2017 04:19 AM    CO2 30 04/20/2017 04:19 AM    GLUCOSE 88 04/20/2017 04:19 AM    BUN 32* 04/20/2017 04:19 AM    CREATININE 4.87* 04/20/2017 04:19 AM      Lab Results   Component Value Date/Time    WBC 3.9* 04/20/2017 04:19 AM    RBC 2.92* 04/20/2017 04:19 AM    HEMOGLOBIN 8.8* 04/20/2017 04:19 AM    HEMATOCRIT 28.3* 04/20/2017 04:19 AM    MCV 96.9 04/20/2017 04:19 AM    MCH 30.1 04/20/2017 04:19 AM    MCHC 31.1* 04/20/2017 04:19 AM    MPV 9.7 04/20/2017 04:19 AM    NEUTROPHILS-POLYS 57.70 04/20/2017 04:19 AM    LYMPHOCYTES 17.50* 04/20/2017 04:19 AM    MONOCYTES 11.90 04/20/2017 04:19 AM    EOSINOPHILS 11.30* 04/20/2017 04:19 AM    BASOPHILS 1.30  04/20/2017 04:19 AM      Lab Results   Component Value Date/Time    CALCIUM 9.7 04/20/2017 04:19 AM    AST(SGOT) 18 04/15/2017 04:15 PM    ALT(SGPT) 15 04/15/2017 04:15 PM    ALKALINE PHOSPHATASE 101* 04/15/2017 04:15 PM    TOTAL BILIRUBIN 0.9 04/15/2017 04:15 PM    ALBUMIN 3.9 04/15/2017 04:15 PM    TOTAL PROTEIN 7.3 04/15/2017 04:15 PM     Lab Results   Component Value Date/Time    URIC ACID 4.6 01/08/2016 01:37 PM    RHEUMATOID FACTOR -NEPH- 11 06/22/2015 04:22 PM    CCP ANTIBODIES 2 06/22/2015 04:22 PM    ANTINUCLEAR ANTIBODY None Detected 05/27/2009 12:16 PM     Lab Results   Component Value Date/Time    C3 COMPLEMENT 90 05/27/2009 12:16 PM    COMPLEMENT C4 19 05/27/2009 12:16 PM     Lab Results   Component Value Date/Time    ANCA IGG <1:20 05/09/2016 02:45 AM    C3 COMPLEMENT 90 05/27/2009 12:16 PM     Lab Results   Component Value Date/Time    SED RATE WESTERGREN 1 05/27/2009 12:16 PM     Lab Results   Component Value Date/Time    G-6-PD 12.0 01/08/2016 01:37 PM     Lab Results   Component Value Date/Time    PTH, INTACT 291.8* 10/11/2013 03:28 PM     Results for orders placed during the hospital encounter of 11/23/07   DX-FOOT-COMPLETE 3+    Impression IMPRESSION:    1. DEGENERATIVE CHANGE OF THE BASE OF THE GREAT TOE INVOLVING THE FIRST   METATARSOPHALANGEAL JOINT WITH ASSOCIATED EDEMA.          2. POSSIBLE LYTIC LESION IN THE PROXIMAL FIFTH METATARSAL.  THE ETIOLOGY   IS UNCERTAIN.    3. NO RADIOPAQUE FOREIGN BODY OR SOFT TISSUE GAS IDENTIFIED.    4. NO FRACTURE IS SEEN.    MM:dxm        Read By CATHY LYNN MD on Nov 23 2007  1:31PM  : DXM Transcription Date: Nov 23 2007  5:48PM  THIS DOCUMENT HAS BEEN ELECTRONICALLY SIGNED BY: CATHY LYNN MD on Nov 30 2007  8:14AM     Results for orders placed during the hospital encounter of 11/27/07   DX-PELVIS-1 OR 2 VIEWS    Impression IMPRESSION:    MILD TO MODERATE OSTEOARTHRITIS IS NOTED IN BOTH HIPS, BUT NO ACUTE   FRACTURE OR DISLOCATION IS  NOTED ON SINGLE VIEW PELVIS.             Results for orders placed during the hospital encounter of 11/27/07   DX-PELVIS-1 OR 2 VIEWS    Impression IMPRESSION:    MILD TO MODERATE OSTEOARTHRITIS IS NOTED IN BOTH HIPS, BUT NO ACUTE   FRACTURE OR DISLOCATION IS NOTED ON SINGLE VIEW PELVIS.             Results for orders placed during the hospital encounter of 06/22/15   DX-HAND 3+    Impression Mild degenerative change of the interphalangeal articulations and the triscaphe articulation.       Results for orders placed during the hospital encounter of 04/15/17   MR-CERVICAL SPINE-WITH & W/O    Impression 1.  Evidence of subacute discitis/osteomyelitis at the C6-7 level.    2.  Mild degenerative anterolisthesis at C7-T1 level.    3.  Prevertebral inflammatory/infectious changes from the C2 level to the C6 level.    4.  Mild to moderate central canal stenosis at the C5-6 level with mild central canal stenosis at the C3-4 and C6-7 levels.    5.  Moderate cervical spondylotic change at the C5-6 level which flattens the ventral surface of the cord. Mild cervical spondylotic changes at the C3-4, C4-5, and C6-7 levels.    6.  Moderate to severe multilevel neural foraminal stenosis.     Results for orders placed during the hospital encounter of 07/14/16   DX-CERVICAL SPINE-2 OR 3 VIEWS    Impression 1.  Degenerative disc disease and facet arthropathy are most prominent in the lower cervical spine.    2.  No compression deformity or acute fracture.    3.  Anterolisthesis noted at C7-T1 likely due to facet arthropathy.      Results for orders placed during the hospital encounter of 10/16/10   CT-CHEST (THORAX) W/O     Results for orders placed during the hospital encounter of 10/15/13   US-VISCERAL VASCULAR COMP    Impression 1.  No evidence of renal artery stenosis.  2.  Borderline to mildly increased intrarenal resistive indices are likely related to an intrarenal process.            INTERPRETING LOCATION: [address] [place] [address]  [place], [address]     Assessment and Plan:     1. Idiopathic chronic gout without tophus, unspecified site  Currently not on any your concern and uric acid level has been normal without any flares, probably resolved.    2. Primary osteoarthritis of both hands  With tenderness to palpation on the right third PIP joint, today will do a cortisone injection    3. Carpal tunnel syndrome on both sides  Status post surgical release on the right hand without benefit    4. Cervicalgia  With associated muscle spasm, today will do a trial of tizanidine muscle relaxer to be used when necessary  - tizanidine (ZANAFLEX) 2 MG tablet; 1-2 tabs po qhs for neck muscle spasm  Dispense: 60 Tab; Refill: 0    5. Spasm  Tizanidine 2-4 mg by mouth daily at bedtime when necessary  - tizanidine (ZANAFLEX) 2 MG tablet; 1-2 tabs po qhs for neck muscle spasm  Dispense: 60 Tab; Refill: 0    6. ESRD (end stage renal disease) (CMS-[facility])  This may impact what medications we can use to treat this patient's rheumatological disease, we will have to continue to monitor closely.    7. HTN (hypertension), malignant  May impact the type of medications we can use for this patient's arthritis. We will have to keep this under advisement.    8. Pericardial effusion  Per patient report caused by minoxidil, patient now off minoxidil and hopefully that pericardial effusion will resolve    Procedure: Right third PIP joint cortisone injection    The procedure was explained to the patient in detail, all questions were answered. Risks and benefits were reviewed with patient and patient states understanding including risks of steroid injections including bleeding, infections, subcutaneous lipolysis and/or hypopigmentation at site of injection, and risk of avascular necrosis. Consent was obtained. The patient was positioned appropriately. Anatomical landmarks were identified.    Right third PIP joint dorsal aspect was prepped with betadine x 3, local anesthetic with ethyl  chloride and 1% Xylocaine lot #[id number], Exp July 2019 and using sterile technique,  injected 5 mg of Depomedrol Lot #[id number], Exp February 2018    EBL 0  The patient tolerated the procedure well, was observed in the office and there were no complications     Patient was asked to rest right third finger for 3 days, patient states understanding and states will comply.  Approximately 30 minutes time was spent face to face with patient of which at least 50% of time was reviewing risk and benefits of procedure and the procedure in detail.      Followup: Return in about 4 months (around 10/2/2017). or sooner prn    Patient was seen 30 minutes face-to-face of which more than 50% of the time was spent counseling the patient (excluding time for procedures)  regarding  rheumatological condition and care. Therapy was discussed in detail.    Please note that this dictation was created using voice recognition software. I have made every reasonable attempt to correct obvious errors, but I expect that there are errors of grammar and possibly content that I did not discover before finalizing the note.

## 2017-06-05 RX ORDER — PRAVASTATIN SODIUM 20 MG
TABLET ORAL
Qty: 90 TAB | Refills: 3 | Status: SHIPPED | OUTPATIENT
Start: 2017-06-05 | End: 2018-08-09 | Stop reason: SDUPTHER

## 2017-06-05 RX ORDER — LISINOPRIL 40 MG/1
TABLET ORAL
Qty: 180 TAB | Refills: 3 | Status: SHIPPED | OUTPATIENT
Start: 2017-06-05 | End: 2017-06-08 | Stop reason: SDUPTHER

## 2017-06-05 NOTE — TELEPHONE ENCOUNTER
Was the patient seen in the last year in this department? No  dialysis patient    Does patient have an active prescription for medications requested? No     Received Request Via: Pharmacy

## 2017-06-08 DIAGNOSIS — I10 UNSPECIFIED ESSENTIAL HYPERTENSION: ICD-10-CM

## 2017-06-08 DIAGNOSIS — I10 ESSENTIAL HYPERTENSION: ICD-10-CM

## 2017-06-08 RX ORDER — SPIRONOLACTONE 25 MG/1
25 TABLET ORAL DAILY
Qty: 30 TAB | Refills: 11 | Status: SHIPPED | OUTPATIENT
Start: 2017-06-08 | End: 2018-03-19

## 2017-06-08 RX ORDER — LISINOPRIL 40 MG/1
40 TABLET ORAL DAILY
Qty: 90 TAB | Refills: 3 | Status: ON HOLD | OUTPATIENT
Start: 2017-06-08 | End: 2018-03-20

## 2017-06-08 RX ORDER — LABETALOL 100 MG/1
500 TABLET, FILM COATED ORAL 2 TIMES DAILY
Qty: 300 TAB | Refills: 6 | Status: CANCELLED | OUTPATIENT
Start: 2017-06-08

## 2017-06-08 RX ORDER — FUROSEMIDE 80 MG
80 TABLET ORAL 2 TIMES DAILY
Qty: 180 TAB | Refills: 3 | Status: ON HOLD | OUTPATIENT
Start: 2017-06-08 | End: 2018-03-20

## 2017-06-08 RX ORDER — LABETALOL 300 MG/1
600 TABLET, FILM COATED ORAL 2 TIMES DAILY
Qty: 120 TAB | Refills: 11 | Status: ON HOLD | OUTPATIENT
Start: 2017-06-08 | End: 2018-03-20

## 2017-06-15 ENCOUNTER — PATIENT OUTREACH (OUTPATIENT)
Dept: HEALTH INFORMATION MANAGEMENT | Facility: OTHER | Age: 77
End: 2017-06-15

## 2017-06-15 NOTE — PROGRESS NOTES
Outcome: Left Message    WebIZ Checked & Epic Updated:  no    HealthConnect Verified: no    Attempt # 1

## 2017-06-15 NOTE — Clinical Note
6/15/2017      Parmjit Castelan  [address]  [place] [address]      Dear Parmjit,    Based on your medical history, our records indicate that you are eligible for [facility] Care Coordination, a free program that focuses on coordinating the care of individuals with ongoing or complex medical needs. Coordinated care can decrease the total cost of care and, most importantly, improve your overall health.    As a Care Coordination participant, you’ll be assigned a personal care coordinator - a medical provider that specializes in carefully monitoring your health and providing care in between visits with your primary care provider and specialists. This program is of no cost to you as it’s a part of [facility]’s ongoing commitment to serving the people of our community.    Benefits of our free Care Coordination program include:  • Priority appointment scheduling with your [facility] healthcare team  • A personal care manager to coordinate your healthcare   • A comprehensive needs assessment  • Development of an individualized care plan   • Chronic disease education  • A free in-home monitoring device (for eligible patients)    Please contact us at [phone] as soon as possible to confirm your enrollment. Once you have confirmed your participation in this program, we will schedule an introduction with your personal care manager.     For your convenience, we are available Monday through Friday, 8:30 am to 5:00 pm.     We look forward to speaking with you soon.    Sincerely,   [facility]

## 2017-06-16 ENCOUNTER — TELEPHONE (OUTPATIENT)
Dept: NEPHROLOGY | Facility: MEDICAL CENTER | Age: 77
End: 2017-06-16

## 2017-06-16 NOTE — PROGRESS NOTES
Outcome: PATIENT REQUESTED TO CALL US BACK ABOUT CARE COORDINATION PROGRAM. REQUESTED TO HAVE TDAP DONE AT A PHARMACY AND REPORTS ALREADY HAVING ZOSTER DONE AT [facility], RECORDS FOUND ON WEBIZ AND UPDATED AT THIS TIME.    WebIZ Checked & Epic Updated:  yes    HealthConnect Verified: no    Attempt # 1ST

## 2017-06-20 ENCOUNTER — OFFICE VISIT (OUTPATIENT)
Dept: INFECTIOUS DISEASES | Facility: MEDICAL CENTER | Age: 77
End: 2017-06-20
Payer: MEDICARE

## 2017-06-20 VITALS
BODY MASS INDEX: 23.88 KG/M2 | WEIGHT: 161.2 LBS | TEMPERATURE: 98.8 F | HEIGHT: 69 IN | SYSTOLIC BLOOD PRESSURE: 144 MMHG | DIASTOLIC BLOOD PRESSURE: 60 MMHG | OXYGEN SATURATION: 92 % | HEART RATE: 73 BPM

## 2017-06-20 DIAGNOSIS — B95.61 BACTEREMIA DUE TO STAPHYLOCOCCUS AUREUS: ICD-10-CM

## 2017-06-20 DIAGNOSIS — R78.81 BACTEREMIA DUE TO STAPHYLOCOCCUS AUREUS: ICD-10-CM

## 2017-06-20 DIAGNOSIS — N18.6 ESRD (END STAGE RENAL DISEASE) (HCC): ICD-10-CM

## 2017-06-20 DIAGNOSIS — M46.42 CERVICAL DISCITIS: ICD-10-CM

## 2017-06-20 PROCEDURE — 99214 OFFICE O/P EST MOD 30 MIN: CPT | Performed by: INTERNAL MEDICINE

## 2017-06-20 NOTE — PROGRESS NOTES
Chief Complaint   Patient presents with   • Follow-Up     Streptococcus viridans infection       Infectious Disease clinic follow-up:  Patient is a 76 y.o. male in the clinic today for ID FU appointment.   76 y.o. male in the clinic today for ID FU appointment. 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 His cultures were positive for strep viridans on  4/15/17. They were negative on 4/17/17. 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 He was discharged on IV vancomycin after hemodialysis through 6/12/17.    5/12/17- saw Rica Blair nurse practitioner. IV vancomycin was continued  5/16/17-  has come back for follow-up. Saw neurosurgery. They recommended PT. He is going to PT about 1 xweek. He is feeling better.   6/20/17-he was recommended a MRI of the C-spine on his last admission. But he refused. His vancomycin was continued through 6/26/17. Has come back for a follow-up. He says his shoulder is getting much better. He is going for physical therapy.  Primary Care Provider: Pcp Pt States None     REVIEW OF SYSTEMS:    Constitutional: Negative for fever and malaise/fatigue.   HENT: Negative for hearing loss and visual changes   Eyes: Negative for blurred vision, double vision and photophobia.   Respiratory: Negative for cough.   Cardiovascular: Negative for chest pain and leg swelling.   Gastrointestinal: Negative for nausea, vomiting and diarrhea.   Musculoskeletal: Neck pain and shoulder pain has improved.  Skin: Negative for rash.   Neurological: Negative for sensory change, focal weakness and headaches.     ALLERGIES:  No Known Allergies     PAST MEDICAL HISTORY:   Past Medical History   Diagnosis Date   • BPH 7/14/2009   • HTN (hypertension) 1/15/2010   • Snoring    • COPD (chronic obstructive pulmonary disease) (CMS-[facility])    •  "Proteinuria    • COPD (chronic obstructive pulmonary disease) (CMS-HCC) 8/2/2011   • EMPHYSEMA    • Detached retina    • CKD (chronic kidney disease) stage 4, GFR 15-29 ml/min (CMS-HCC) 1/15/2010     end stage renal disease   • Kidney cyst    • On supplemental oxygen therapy    • Heart burn      occas   • Indigestion      occas   • Bronchitis 1/1/13   • CAD (coronary artery disease) 2/20/2014   • Sleep apnea      O2 PER CANULA  AT NIGHT 2l/m   • Dialysis      m,w,f davita   • CATARACT      sanjuanita surgery complete   • Basal cell carcinoma of left cheek 3/26/2015   • Leg pain, bilateral 8/10/2015   • Gout    • Pneumonia    • Anesthesia      \"needs more sedation\" (can sometimes hear MD during procedure)    • High cholesterol        PAST SURGICAL HISTORY:    Past Surgical History   Procedure Laterality Date   • Cataract phaco with iol  4/8/08     Performed by STACEY PHILLIPS at SURGERY SAME DAY [facility]   • Av fistula creation  2/12/2010     Performed by ALESHIA MOSCOSO at SURGERY [facility]S   • Av fistula revision  2/19/2010     Performed by WILLIE HATCH at SURGERY [facility]S   • Av fistulogram  7/23/2010     Performed by ALESHIA MOSCOSO at SURGERY [facility] ORS   • Angioplasty balloon  7/23/2010     Performed by ALESHIA MOSCOSO at SURGERY [facility]S   • Av fistulogram  9/17/2010     Performed by ALESHIA MOSCOSO at SURGERY [facility] ORS   • Angioplasty balloon  9/17/2010     Performed by ALESHIA MOSCOSO at SURGERY [facility] ORS   • Vitrectomy posterior  1/18/2011     Performed by NAHUM GE at SURGERY SAME DAY [facility]   • Scleral buckling  1/18/2011     Performed by NAHUM GE at SURGERY SAME DAY [facility]   • Recovery  8/12/2011     Performed by SURGERY, IR-RECOVERY at SURGERY SAME DAY [facility]   • Vitrectomy posterior  10/11/2011     Performed by NAHUM GE at SURGERY SAME DAY [facility]   • Recovery  7/23/2012     Performed by SURGERY, " IR-RECOVERY at SURGERY SAME DAY [facility] ORS   • Recovery  1/29/2013     Performed by Ir-Recovery Surgery at SURGERY SAME DAY [facility] OR[facility]   • Recovery  7/2/2013     Performed by Ir-Recovery Surgery at SURGERY SAME DAY [facility] OR[facility]   • Av fistula thrombolysis  7/2/2013     Performed by David Cason M.D. at SURGERY [facility] ORS   • Recovery  12/17/2013     Performed by Ir-Recovery Surgery at SURGERY SAME DAY [facility] OR[facility]   • Recovery  3/24/2014     Performed by Ir-Recovery Surgery at SURGERY [facility] ORS   • Recovery  7/29/2014     Performed by Ir-Recovery Surgery at SURGERY SAME DAY [facility]   • Recovery  3/24/2015     Performed by Recoveryonly Surgery at SURGERY [facility] [facility]   • Recovery  12/23/2015     Procedure: VASCULAR CASE-CASON-LEFT ARM FISTULOGRAM WITH ANGIOPLASTY;  Surgeon: Recoveryonbhavani Surgery;  Location[place] [facility] [facility];  Service:    • Gastroscopy with biopsy  8/13/2016     Procedure: GASTROSCOPY WITH BIOPSY;  Surgeon: Jorge Leavitt M.D.;  Location: ENDOSCOPY [facility];  Service:    • Colonoscopy - endo  8/15/2016     Procedure: COLONOSCOPY - ENDO;  Surgeon: Mane Whatley M.D.;  Location: ENDOSCOPY [facility];  Service:         MEDICATIONS:   Current Outpatient Prescriptions   Medication Sig Dispense Refill   • spironolactone (ALDACTONE) 25 MG Tab Take 1 Tab by mouth every day. 30 Tab 11   • lisinopril (PRINIVIL, ZESTRIL) 40 MG tablet Take 1 Tab by mouth every day. 90 Tab 3   • furosemide (LASIX) 80 MG Tab Take 1 Tab by mouth 2 times a day. 180 Tab 3   • labetalol (NORMODYNE) 300 MG Tab Take 2 Tabs by mouth 2 times a day. 120 Tab 11   • pravastatin (PRAVACHOL) 20 MG Tab TAKE ONE TABLET BY MOUTH DAILY 90 Tab 3   • tizanidine (ZANAFLEX) 2 MG tablet 1-2 tabs po qhs for neck muscle spasm 60 Tab 0   • Vancomycin HCl (MD ALERT... VANCOMYCIN) 1 Each by Other route pharmacy to dose. 1 Each 25   • oxycodone-acetaminophen (PERCOCET) 5-325 MG Tab Take 1-2  "Tabs by mouth every 6 hours as needed for Severe Pain. 20 Tab 0   • trazodone (DESYREL) 150 MG Tab TAKE TWO TABLETS BY MOUTH EVERY NIGHT AT BEDTIME (GENERIC FOR DESYREL) 180 Tab 4   • ondansetron (ZOFRAN ODT) 4 MG TABLET DISPERSIBLE DISSOLVE 1 TABLET BY MOUTH EVERY 6 HOURS AS NEEDED FOR NAUSEA 30 Tab 4   • B Complex-C-Folic Acid (DIALYVITE TABLET) Tab TAKE ONE TABLET BY MOUTH DAILY 90 Tab 4   • tamsulosin (FLOMAX) 0.4 MG capsule TAKE TWO CAPSULES BY MOUTH DAILY 1/2 HOUR AFTER BREAKFAST 180 Cap 11   • Calcium Acetate, Phos Binder, 667 MG Cap TAKE TWO CAPSULES BY MOUTH THREE TIMES A DAY WITH A MEAL 180 Cap 11   • fluticasone (FLONASE) 50 MCG/ACT nasal spray PLACE ONE TO TWO SPRAYS IN EACH NOSTRIL EVERY DAY 1 Bottle 5   • omeprazole (PRILOSEC) 40 MG delayed-release capsule TAKE ONE CAPSULE BY MOUTH DAILY (GENERIC FOR PRILOSEC) 90 Cap 4   • epoetin hilario (EPOGEN,PROCRIT) 2000 UNIT/ML Solution Inject 1 mL as instructed every Monday, Wednesday, and Friday. 1 mL 0   • pravastatin (PRAVACHOL) 10 MG Tab Take 10 mg by mouth every evening.     • amlodipine (NORVASC) 10 MG Tab TAKE ONE TABLET BY MOUTH DAILY 90 Tab 4   • budesonide-formoterol (SYMBICORT) 160-4.5 MCG/ACT Aerosol Inhale 2 Puffs by mouth 2 Times a Day. Inhale 2 puffs by mouth twice daily. Rinse mouth after each use. 1 Inhaler 12   • lidocaine (LIDODERM) 5 % Patch Apply 1 Patch to skin as directed every 24 hours. 30 Patch 11   • morphine ER (MS CONTIN) 30 MG Tab CR tablet Take 1 Tab by mouth every 12 hours. (Patient not taking: Reported on 4/28/2017) 30 Tab 0     No current facility-administered medications for this visit.        LABORATORY DATA:       PHYSICAL EXAMINATION:PE:      /60 mmHg  Pulse 73  Temp(Src) 37.1 °C (98.8 °F)  Ht 1.74 m (5' 8.5\")  Wt 73.12 kg (161 lb 3.2 oz)  BMI 24.15 kg/m2  SpO2 92%       Constitutional: patient is oriented to person, place, and time. He appears well-developed and well-nourished. No distress  Eyes: Conjunctivae " normal and EOM are normal. Pupils are equal, round, and reactive to light.   Mouth/Throat: Lips without lesions, good dentition, oropharynx is clear and moist.  Neck: Trachea midline. Normal range of motion. Neck supple. No masses  Cardiovascular: Irregular plus murmur heard  Pulmonary/Chest: No respiratory distress. Unlabored respiratory effort, lungs clear to auscultation. No wheezes or rales.   Abdominal: Soft, non tender. BS + x 4. No masses or hepatosplenomegaly.   Musculoskeletal: Fistula in the left arm  Neurological: He is alert and oriented to person, place, and time. No cranial nerve deficit. Coordination normal.   Skin: Skin is warm and dry. Good turgor. No rashes visable.  Psychiatric: He has a normal mood and affect. His behavior is normal.        ASSESSMENT:  No diagnosis found.     1. Cervical discitis  MR-CERVICAL SPINE-WITH   2. ESRD (end stage renal disease) (CMS-[facility])  MR-CERVICAL SPINE-WITH   3. Bacteremia due to Staphylococcus aureus  MR-CERVICAL SPINE-WITH     RECOMMENDATIONS:      I again had a long talk with the patient. He is finishing his Vanco on 6/26/17. I have ordered a MRI of his C-spine. I put in a note for the radiologist that he is severely claustrophobic. If he needs sedation then that will be prescribed by his urologist because of his ESRD.  I will see him back in one month  No Follow-up on file.

## 2017-06-20 NOTE — MR AVS SNAPSHOT
"Parmjit Castelan   2017 11:45 AM   Office Visit   MRN: [id number]    Department:  [facility]   Dept Phone:  [phone]    Description:  Male : 1940   Provider:  Ara Whittaker M.D.           Reason for Visit     Follow-Up Streptococcus viridans infection      Allergies as of 2017     No Known Allergies      You were diagnosed with     Cervical discitis   [944388]       ESRD (end stage renal disease) (CMS-[facility])   [380976]       Bacteremia due to Staphylococcus aureus   [123224]         Vital Signs     Blood Pressure Pulse Temperature Height Weight Body Mass Index    144/60 mmHg 73 37.1 °C (98.8 °F) 1.74 m (5' 8.5\") 73.12 kg (161 lb 3.2 oz) 24.15 kg/m2    Oxygen Saturation Smoking Status                92% Former Smoker          Basic Information     Date Of Birth Sex Race Ethnicity Preferred Language    1940 Male White Non- English      Your appointments     2017  1:00 PM   FOLLOW UP with James Mendoza M.D.   [facility] (--)    [address], [address]  [place] [address] [phone]            2017  2:00 PM   Pulmonary Function Test with PFT-RM2   [facility] (--)    [address]  [address]  [place] [id number] [phone]            2017  3:00 PM   Established Patient Pul with LIAN Valdes   [facility] (--)    [address]  [address]  [place] [id number] [phone]            Oct 03, 2017  1:00 PM   Follow Up Visit with Shauna Lorenzo M.D.   [facility] (--)    [address]  [place] [id number] [phone]           You will be receiving a confirmation call a few days before your appointment from our automated call confirmation system.              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   12/14/2012 - Present    Kidney damage S37.009A   1/29/2013 - Present    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   7/2/2013 - Present    Elevated coronary artery calcium score R93.1 High  2/20/2014 - Present    End stage renal disease (HCC) N18.6   3/24/2014 - Present    Dyslipidemia E78.5 Low  12/5/2014 - Present    Pigmented skin lesion L81.9   3/18/2015 - Present    Basal cell carcinoma of left cheek C44.319   3/26/2015 - Present    Anemia in CKD (chronic kidney disease) N18.9, D63.1   5/29/2015 - Present    Leg pain, bilateral M79.604, M79.605   8/10/2015 - Present    Primary osteoarthritis of both hands M19.041, M19.042   8/20/2015 - Present    Chronic gout M1A.9XX0   1/7/2016 - Present    Respiratory failure (CMS-HCC) J96.90   5/8/2016 - Present    Hyperkalemia, diminished renal excretion E87.5   5/8/2016 - Present    Pneumonia J18.9   5/8/2016 - Present    Pulmonary edema J81.1   5/8/2016 - Present    Anemia D64.9   5/8/2016 - Present    Leukocytosis D72.829   5/8/2016 - Present    COPD exacerbation (CMS-HCC) J44.1   5/8/2016 - Present    ESRD (end stage renal disease) on dialysis (CMS-HCC) N18.6, Z99.2   5/8/2016 - Present    Fluid overload E87.70   5/8/2016 - Present    Systolic CHF, acute (CMS-HCC) I50.21   5/11/2016 - Present    HTN (hypertension), malignant I10   5/11/2016 - Present    Bacteremia due to Staphylococcus aureus R78.81   5/11/2016 - Present    Hyperkalemia E87.5   5/11/2016 - Present    Renal cyst N28.1   5/11/2016 - Present    ESRD (end stage renal disease) (CMS-HCC)  N18.6   8/12/2016 - Present    Hypotension I95.9 Medium  8/13/2016 - Present    Need for prophylactic vaccination with combined vaccine Z23   9/8/2016 - Present    AVM (arteriovenous malformation) Q27.30   9/8/2016 - Present    Dyspnea on exertion R06.09   3/16/2017 - Present    Nocturnal hypoxemia G47.34   3/21/2017 - Present    Pulmonary hypertension (CMS-HCC) I27.2   3/21/2017 - Present    Pedal edema R60.0   3/21/2017 - Present    Acute neck pain M54.2   4/15/2017 - Present    Bacteremia due to Gram-positive bacteria A49.9   4/16/2017 - Present    Pericardial effusion I31.3 Medium  4/20/2017 - Present    Aortic valve sclerosis I35.8 Medium  4/20/2017 - Present    Cervical discitis M46.42   4/28/2017 - Present      Health Maintenance        Date Due Completion Dates    IMM DTaP/Tdap/Td Vaccine (1 - Tdap) 7/30/1961 ---    IMM PNEUMOCOCCAL 65+ (ADULT) HIGH/HIGHEST RISK SERIES (2 of 2 - PCV13) 9/8/2017 9/8/2016    COLONOSCOPY 8/15/2026 8/15/2016, 9/16/2013            Current Immunizations     Influenza TIV (IM) 10/1/2013    Pneumococcal polysaccharide vaccine (PPSV-23) 9/8/2016    SHINGLES VACCINE 10/11/2015      Below and/or attached are the medications your provider expects you to take. Review all of your home medications and newly ordered medications with your provider and/or pharmacist. Follow medication instructions as directed by your provider and/or pharmacist. Please keep your medication list with you and share with your provider. Update the information when medications are discontinued, doses are changed, or new medications (including over-the-counter products) are added; and carry medication information at all times in the event of emergency situations     Allergies:  No Known Allergies          Medications  Valid as of: June 20, 2017 - 12:24 PM    Generic Name Brand Name Tablet Size Instructions for use    AmLODIPine Besylate (Tab) NORVASC 10 MG TAKE ONE TABLET BY MOUTH DAILY        B Complex-C-Folic Acid  (Tab) DIALYVITE TABLET  TAKE ONE TABLET BY MOUTH DAILY        Budesonide-Formoterol Fumarate (Aerosol) SYMBICORT 160-4.5 MCG/ACT Inhale 2 Puffs by mouth 2 Times a Day. Inhale 2 puffs by mouth twice daily. Rinse mouth after each use.        Calcium Acetate (Phos Binder) (Cap) Calcium Acetate (Phos Binder) 667 MG TAKE TWO CAPSULES BY MOUTH THREE TIMES A DAY WITH A MEAL        Epoetin Mitchel (Solution) EPOGEN,PROCRIT 2000 UNIT/ML Inject 1 mL as instructed every Monday, Wednesday, and Friday.        Fluticasone Propionate (Suspension) FLONASE 50 MCG/ACT PLACE ONE TO TWO SPRAYS IN EACH NOSTRIL EVERY DAY        Furosemide (Tab) LASIX 80 MG Take 1 Tab by mouth 2 times a day.        Labetalol HCl (Tab) NORMODYNE 300 MG Take 2 Tabs by mouth 2 times a day.        Lidocaine (Patch) LIDODERM 5 % Apply 1 Patch to skin as directed every 24 hours.        Lisinopril (Tab) PRINIVIL, ZESTRIL 40 MG Take 1 Tab by mouth every day.        Morphine Sulfate (Tab CR) MS CONTIN 30 MG Take 1 Tab by mouth every 12 hours.        Omeprazole (CAPSULE DELAYED RELEASE) PRILOSEC 40 MG TAKE ONE CAPSULE BY MOUTH DAILY (GENERIC FOR PRILOSEC)        Ondansetron (TABLET DISPERSIBLE) ZOFRAN ODT 4 MG DISSOLVE 1 TABLET BY MOUTH EVERY 6 HOURS AS NEEDED FOR NAUSEA        Oxycodone-Acetaminophen (Tab) PERCOCET 5-325 MG Take 1-2 Tabs by mouth every 6 hours as needed for Severe Pain.        Pravastatin Sodium (Tab) PRAVACHOL 10 MG Take 10 mg by mouth every evening.        Pravastatin Sodium (Tab) PRAVACHOL 20 MG TAKE ONE TABLET BY MOUTH DAILY        Spironolactone (Tab) ALDACTONE 25 MG Take 1 Tab by mouth every day.        Tamsulosin HCl (Cap) FLOMAX 0.4 MG TAKE TWO CAPSULES BY MOUTH DAILY 1/2 HOUR AFTER BREAKFAST        TiZANidine HCl (Tab) ZANAFLEX 2 MG 1-2 tabs po qhs for neck muscle spasm        TraZODone HCl (Tab) DESYREL 150 MG TAKE TWO TABLETS BY MOUTH EVERY NIGHT AT BEDTIME (GENERIC FOR DESYREL)        Vancomycin HCl (MD ALERT... VANCOMYCIN) MD ALERT...  vancomycin  1 Each by Other route pharmacy to dose.        .                 Medicines prescribed today were sent to:     [facility][id number] - [place], [place] - [address]    [address] [place] [address]    Phone: [phone] Fax: [phone]    Open 24 Hours?: No      Medication refill instructions:       If your prescription bottle indicates you have medication refills left, it is not necessary to call your provider’s office. Please contact your pharmacy and they will refill your medication.    If your prescription bottle indicates you do not have any refills left, you may request refills at any time through one of the following ways: The online [employer] system (except Urgent Care), by calling your provider’s office, or by asking your pharmacy to contact your provider’s office with a refill request. Medication refills are processed only during regular business hours and may not be available until the next business day. Your provider may request additional information or to have a follow-up visit with you prior to refilling your medication.   *Please Note: Medication refills are assigned a new Rx number when refilled electronically. Your pharmacy may indicate that no refills were authorized even though a new prescription for the same medication is available at the pharmacy. Please request the medicine by name with the pharmacy before contacting your provider for a refill.        Your To Do List     Future Labs/Procedures Complete By Emma HARRIS-CERVICAL SPINE-WITH  As directed 6/20/2018         [employer] Status: Patient Declined

## 2017-06-22 ENCOUNTER — TELEPHONE (OUTPATIENT)
Dept: INFECTIOUS DISEASES | Facility: MEDICAL CENTER | Age: 77
End: 2017-06-22

## 2017-06-22 ENCOUNTER — TELEPHONE (OUTPATIENT)
Dept: CARDIOLOGY | Facility: MEDICAL CENTER | Age: 77
End: 2017-06-22

## 2017-06-22 ENCOUNTER — HOSPITAL ENCOUNTER (OUTPATIENT)
Dept: LAB | Facility: MEDICAL CENTER | Age: 77
End: 2017-06-22
Attending: INTERNAL MEDICINE
Payer: MEDICARE

## 2017-06-22 DIAGNOSIS — E78.5 DYSLIPIDEMIA: ICD-10-CM

## 2017-06-22 DIAGNOSIS — R93.1 ELEVATED CORONARY ARTERY CALCIUM SCORE: ICD-10-CM

## 2017-06-22 DIAGNOSIS — I10 ESSENTIAL HYPERTENSION: ICD-10-CM

## 2017-06-22 LAB
ALBUMIN SERPL BCP-MCNC: 4 G/DL (ref 3.2–4.9)
ALBUMIN/GLOB SERPL: 1.7 G/DL
ALP SERPL-CCNC: 103 U/L (ref 30–99)
ALT SERPL-CCNC: 18 U/L (ref 2–50)
ANION GAP SERPL CALC-SCNC: 8 MMOL/L (ref 0–11.9)
AST SERPL-CCNC: 23 U/L (ref 12–45)
BILIRUB SERPL-MCNC: 0.8 MG/DL (ref 0.1–1.5)
BUN SERPL-MCNC: 23 MG/DL (ref 8–22)
CALCIUM SERPL-MCNC: 9.5 MG/DL (ref 8.5–10.5)
CHLORIDE SERPL-SCNC: 102 MMOL/L (ref 96–112)
CHOLEST SERPL-MCNC: 90 MG/DL (ref 100–199)
CO2 SERPL-SCNC: 32 MMOL/L (ref 20–33)
CREAT SERPL-MCNC: 5.6 MG/DL (ref 0.5–1.4)
GFR SERPL CREATININE-BSD FRML MDRD: 10 ML/MIN/1.73 M 2
GLOBULIN SER CALC-MCNC: 2.3 G/DL (ref 1.9–3.5)
GLUCOSE SERPL-MCNC: 91 MG/DL (ref 65–99)
HDLC SERPL-MCNC: 57 MG/DL
LDLC SERPL CALC-MCNC: 26 MG/DL
POTASSIUM SERPL-SCNC: 4.4 MMOL/L (ref 3.6–5.5)
PROT SERPL-MCNC: 6.3 G/DL (ref 6–8.2)
SODIUM SERPL-SCNC: 142 MMOL/L (ref 135–145)
TRIGL SERPL-MCNC: 37 MG/DL (ref 0–149)

## 2017-06-22 PROCEDURE — 36415 COLL VENOUS BLD VENIPUNCTURE: CPT

## 2017-06-22 PROCEDURE — 80061 LIPID PANEL: CPT

## 2017-06-22 PROCEDURE — 80053 COMPREHEN METABOLIC PANEL: CPT

## 2017-06-22 NOTE — TELEPHONE ENCOUNTER
Called Briseyda at [facility], patient's creatinine is 5.6 today (6/22/2017).    Called Dr. Larson's office (patient's nephrologist) and advised his MA of the above. Patient is on dialysis.    WILD BYRD

## 2017-06-22 NOTE — TELEPHONE ENCOUNTER
----- Message from Reba Jerome sent at 6/22/2017 12:27 PM PDT -----  Regarding: critical result on creatinine  Contact: [phone]  ODILON/makayla Rain from [facility] to report a Critical result on creatinine.     Please call Briseyda at [phone].

## 2017-06-22 NOTE — TELEPHONE ENCOUNTER
Pt is asking to have General Anaesthesia with the MRI. He has to have these tests prior to the MRI with Anaesthesia on 7/17/2017. Can you please sign the orders so th ept can get them done early next week. Please advise.Thank you. JAQUELINE      Pt called back and he does not want to to the MRI with Sedation. He is looking for a Open MRI if he can not find one then he will not do the MRI at all then. JAQUELINE

## 2017-06-23 ENCOUNTER — TELEPHONE (OUTPATIENT)
Dept: INFECTIOUS DISEASES | Facility: MEDICAL CENTER | Age: 77
End: 2017-06-23

## 2017-06-23 NOTE — TELEPHONE ENCOUNTER
Pt is not going to do the MRI with Anaesthesia. He is looking for an open MRI. He will look for one. If he can not find one he will not do the MRI. JAQUELINE

## 2017-06-27 ENCOUNTER — OFFICE VISIT (OUTPATIENT)
Dept: CARDIOLOGY | Facility: MEDICAL CENTER | Age: 77
End: 2017-06-27
Payer: MEDICARE

## 2017-06-27 VITALS
HEART RATE: 76 BPM | WEIGHT: 160 LBS | SYSTOLIC BLOOD PRESSURE: 190 MMHG | BODY MASS INDEX: 23.7 KG/M2 | HEIGHT: 69 IN | DIASTOLIC BLOOD PRESSURE: 80 MMHG | OXYGEN SATURATION: 88 %

## 2017-06-27 DIAGNOSIS — E78.5 DYSLIPIDEMIA: ICD-10-CM

## 2017-06-27 DIAGNOSIS — R93.1 ELEVATED CORONARY ARTERY CALCIUM SCORE: ICD-10-CM

## 2017-06-27 DIAGNOSIS — I10 ESSENTIAL HYPERTENSION: ICD-10-CM

## 2017-06-27 PROCEDURE — 99214 OFFICE O/P EST MOD 30 MIN: CPT | Performed by: INTERNAL MEDICINE

## 2017-06-27 NOTE — PROGRESS NOTES
"Subjective:   Parmjit Castelan is a 76 y.o. male who presents today followup of his hypertension. He also was noted to have coronary calcification on his cardiac CT in 2010. He underwent a myocardial perfusion scan December 2014 which was unremarkable. He does have end-stage renal disease and is on chronic hemodialysis.    He has been having some increasing difficulty with edema. In addition, his blood pressure has been significantly elevated up to about 200 systolic. Even after dialysis he still has edema.    He is having difficulty with claudication at about a block and a half to 2 blocks. He is really not dyspneic with exertion but limited by his claudication. He has had no PND or orthopnea. `    He has had no PND, orthopnea, chest discomfort, palpitations or lightheadedness.    Past Medical History   Diagnosis Date   • BPH 7/14/2009   • HTN (hypertension) 1/15/2010   • Snoring    • COPD (chronic obstructive pulmonary disease) (CMS-HCC)    • Proteinuria    • COPD (chronic obstructive pulmonary disease) (CMS-HCC) 8/2/2011   • EMPHYSEMA    • Detached retina    • CKD (chronic kidney disease) stage 4, GFR 15-29 ml/min (CMS-HCC) 1/15/2010     end stage renal disease   • Kidney cyst    • On supplemental oxygen therapy    • Heart burn      occas   • Indigestion      occas   • Bronchitis 1/1/13   • CAD (coronary artery disease) 2/20/2014   • Sleep apnea      O2 PER CANULA  AT NIGHT 2l/m   • Dialysis      m,w,f juliann   • CATARACT      sanjuanita surgery complete   • Basal cell carcinoma of left cheek 3/26/2015   • Leg pain, bilateral 8/10/2015   • Gout    • Pneumonia    • Anesthesia      \"needs more sedation\" (can sometimes hear MD during procedure)    • High cholesterol      Past Surgical History   Procedure Laterality Date   • Cataract phaco with iol  4/8/08     Performed by STACEY PHILLIPS at SURGERY SAME DAY [facility] ORS   • Av fistula creation  2/12/2010     Performed by ALESHIA MOSCOSO at SURGERY [facility] ORS   • Av " fistula revision  2/19/2010     Performed by WILLIE HATCH at SURGERY [facility] ORS   • Av fistulogram  7/23/2010     Performed by DAVID CASON at SURGERY [facility]   • Angioplasty balloon  7/23/2010     Performed by DAVID CASON at SURGERY [facility] ORS   • Av fistulogram  9/17/2010     Performed by DAVID CASON at SURGERY [facility] ORS   • Angioplasty balloon  9/17/2010     Performed by DAVID CASON at SURGERY [facility] ORS   • Vitrectomy posterior  1/18/2011     Performed by NAHUM GE at SURGERY SAME DAY [facility] ORS   • Scleral buckling  1/18/2011     Performed by NAHUM GE at SURGERY SAME DAY [facility] ORS   • Recovery  8/12/2011     Performed by SURGERY, IR-RECOVERY at SURGERY SAME DAY [facility] ORS   • Vitrectomy posterior  10/11/2011     Performed by NAHUM GE at SURGERY SAME DAY [facility] ORS   • Recovery  7/23/2012     Performed by SURGERY, IR-RECOVERY at SURGERY SAME DAY [facility] ORS   • Recovery  1/29/2013     Performed by Ir-Recovery Surgery at SURGERY SAME DAY [facility] ORS   • Recovery  7/2/2013     Performed by Ir-Recovery Surgery at SURGERY SAME DAY [facility] ORS   • Av fistula thrombolysis  7/2/2013     Performed by David Cason M.D. at SURGERY [facility]   • Recovery  12/17/2013     Performed by Ir-Recovery Surgery at SURGERY SAME DAY [facility] ORS   • Recovery  3/24/2014     Performed by Ir-Recovery Surgery at SURGERY [facility]   • Recovery  7/29/2014     Performed by Ir-Recovery Surgery at SURGERY SAME DAY [facility] ORS   • Recovery  3/24/2015     Performed by Recoveryonly Surgery at SURGERY PRE-POST PROC UNIT [facility]   • Recovery  12/23/2015     Procedure: VASCULAR CASE-BLANKA-LEFT ARM FISTULOGRAM WITH ANGIOPLASTY;  Surgeon: Recoveryonbhavani Surgery;  Location: SURGERY PRE-POST PROC UNIT [facility];  Service:    • Gastroscopy with biopsy  8/13/2016     Procedure: GASTROSCOPY WITH BIOPSY;  Surgeon: Jorge Leavitt M.D.;  Location:  ENDOSCOPY [facility];  Service:    • Colonoscopy - endo  8/15/2016     Procedure: COLONOSCOPY - ENDO;  Surgeon: Mane Whatley M.D.;  Location: ENDOSCOPY [facility];  Service:      Family History   Problem Relation Age of Onset   • Lung Disease Father      Emphysema, resp failure   • Genitourinary () Maternal Aunt      hematuria   • Stroke Mother    • Hypertension Mother    • Hypertension Brother      History   Smoking status   • Former Smoker -- 1.00 packs/day for 40 years   • Types: Cigarettes   • Quit date: 01/01/2009   Smokeless tobacco   • Never Used     Comment: 1 pk a day for 35 yrs, QUIT JAN 1 2010     No Known Allergies  Outpatient Encounter Prescriptions as of 6/27/2017   Medication Sig Dispense Refill   • spironolactone (ALDACTONE) 25 MG Tab Take 1 Tab by mouth every day. 30 Tab 11   • lisinopril (PRINIVIL, ZESTRIL) 40 MG tablet Take 1 Tab by mouth every day. 90 Tab 3   • furosemide (LASIX) 80 MG Tab Take 1 Tab by mouth 2 times a day. 180 Tab 3   • labetalol (NORMODYNE) 300 MG Tab Take 2 Tabs by mouth 2 times a day. 120 Tab 11   • pravastatin (PRAVACHOL) 20 MG Tab TAKE ONE TABLET BY MOUTH DAILY 90 Tab 3   • tizanidine (ZANAFLEX) 2 MG tablet 1-2 tabs po qhs for neck muscle spasm 60 Tab 0   • oxycodone-acetaminophen (PERCOCET) 5-325 MG Tab Take 1-2 Tabs by mouth every 6 hours as needed for Severe Pain. 20 Tab 0   • trazodone (DESYREL) 150 MG Tab TAKE TWO TABLETS BY MOUTH EVERY NIGHT AT BEDTIME (GENERIC FOR DESYREL) 180 Tab 4   • ondansetron (ZOFRAN ODT) 4 MG TABLET DISPERSIBLE DISSOLVE 1 TABLET BY MOUTH EVERY 6 HOURS AS NEEDED FOR NAUSEA 30 Tab 4   • B Complex-C-Folic Acid (DIALYVITE TABLET) Tab TAKE ONE TABLET BY MOUTH DAILY 90 Tab 4   • tamsulosin (FLOMAX) 0.4 MG capsule TAKE TWO CAPSULES BY MOUTH DAILY 1/2 HOUR AFTER BREAKFAST 180 Cap 11   • Calcium Acetate, Phos Binder, 667 MG Cap TAKE TWO CAPSULES BY MOUTH THREE TIMES A DAY WITH A MEAL 180 Cap 11   • fluticasone (FLONASE) 50 MCG/ACT nasal  "spray PLACE ONE TO TWO SPRAYS IN EACH NOSTRIL EVERY DAY 1 Bottle 5   • omeprazole (PRILOSEC) 40 MG delayed-release capsule TAKE ONE CAPSULE BY MOUTH DAILY (GENERIC FOR PRILOSEC) 90 Cap 4   • epoetin hilario (EPOGEN,PROCRIT) 2000 UNIT/ML Solution Inject 1 mL as instructed every Monday, Wednesday, and Friday. 1 mL 0   • amlodipine (NORVASC) 10 MG Tab TAKE ONE TABLET BY MOUTH DAILY 90 Tab 4   • budesonide-formoterol (SYMBICORT) 160-4.5 MCG/ACT Aerosol Inhale 2 Puffs by mouth 2 Times a Day. Inhale 2 puffs by mouth twice daily. Rinse mouth after each use. 1 Inhaler 12   • lidocaine (LIDODERM) 5 % Patch Apply 1 Patch to skin as directed every 24 hours. (Patient not taking: Reported on 6/27/2017) 30 Patch 11   • Vancomycin HCl (MD ALERT... VANCOMYCIN) 1 Each by Other route pharmacy to dose. (Patient not taking: Reported on 6/27/2017) 1 Each 25   • morphine ER (MS CONTIN) 30 MG Tab CR tablet Take 1 Tab by mouth every 12 hours. (Patient not taking: Reported on 4/28/2017) 30 Tab 0   • pravastatin (PRAVACHOL) 10 MG Tab Take 10 mg by mouth every evening.       No facility-administered encounter medications on file as of 6/27/2017.     ROS       Objective:   /80 mmHg  Pulse 76  Ht 1.74 m (5' 8.5\")  Wt 72.576 kg (160 lb)  BMI 23.97 kg/m2  SpO2 88%    Physical Exam   Neck: No JVD present.   Cardiovascular: Normal rate and regular rhythm.  Exam reveals no gallop.    Murmur (grade 3/6 systolic at the base and grade 2-3/6 systolic at the apex.) heard.  Pulmonary/Chest: Effort normal. He has no rales.   Abdominal: Soft. There is no tenderness.   Musculoskeletal: He exhibits edema (3-4+ pretibial).     Lab Results   Component Value Date/Time    CHOLESTEROL,TOT 90* 06/22/2017 07:55 AM    LDL 26 06/22/2017 07:55 AM    HDL 57 06/22/2017 07:55 AM    TRIGLYCERIDES 37 06/22/2017 07:55 AM       Lab Results   Component Value Date/Time    SODIUM 142 06/22/2017 07:55 AM    POTASSIUM 4.4 06/22/2017 07:55 AM    CHLORIDE 102 06/22/2017 " 07:55 AM    CO2 32 06/22/2017 07:55 AM    GLUCOSE 91 06/22/2017 07:55 AM    BUN 23* 06/22/2017 07:55 AM    CREATININE 5.60* 06/22/2017 07:55 AM     Lab Results   Component Value Date/Time    ALKALINE PHOSPHATASE 103* 06/22/2017 07:55 AM    AST(SGOT) 23 06/22/2017 07:55 AM    ALT(SGPT) 18 06/22/2017 07:55 AM    TOTAL BILIRUBIN 0.8 06/22/2017 07:55 AM      Echocardiogram:  CONCLUSIONS  Normal left ventricular chamber size.  Left ventricular ejection fraction is visually estimated to be 5%.  Mild mitral regurgitation.  Moderately dilated left atrium.  Aortic sclerosis without stenosis.  Mild tricuspid regurgitation.  Right ventricular systolic pressure is estimated to be 52-57 mmHg.  Dilated inferior vena cava with partial inspiratory collapse.  The right ventricle was normal in size and function.  Small pericardial effusion without evidence of hemodynamic compromise.    Exam Date:         05/09/2016         Assessment:     1. Elevated coronary artery calcium score     2. Essential hypertension     3. Dyslipidemia         Medical Decision Making:  Today's Assessment / Status / Plan:     Mr. Castelan is having difficulty with increasing edema and hypertension. I feel that he needs to have more fluid removed at dialysis. I will call his nephrologist to discuss this. If this does not improve his blood pressure, we can then decide whether or not to increase his antihypertensive therapy. His lipid status appears better excellent control. He will follow-up in 4-6 weeks.

## 2017-06-27 NOTE — MR AVS SNAPSHOT
"Parmjit Castelan   2017 1:00 PM   Office Visit   MRN: [id number]    Department:  [facility]   Dept Phone:  [phone]    Description:  Male : 1940   Provider:  James Mendoza M.D.           Reason for Visit     Follow-Up           Allergies as of 2017     No Known Allergies      You were diagnosed with     Elevated coronary artery calcium score   [7155217]       Essential hypertension   [9603850]       Dyslipidemia   [436702]         Vital Signs     Blood Pressure Pulse Height Weight Body Mass Index Oxygen Saturation    190/80 mmHg 76 1.74 m (5' 8.5\") 72.576 kg (160 lb) 23.97 kg/m2 88%    Smoking Status                   Former Smoker           Basic Information     Date Of Birth Sex Race Ethnicity Preferred Language    1940 Male White Non- English      Your appointments     2017  1:15 PM   FOLLOW UP with James Mendoza M.D.   [facility] (--)    [address], [address]  [place] [address] [phone]            2017 11:00 AM   Follow Up Visit with Yessenia Senior M.D.   [facility] ([address] [place] [address])    [address] [facility] [address] [facility] [address]  [place] [address] [phone]           You will be receiving a confirmation call a few days before your appointment from our automated call confirmation system.            2017  2:00 PM   Pulmonary Function Test with PFT-RM2   [facility] (--)    [address]  [address]  [place] [address] [phone]            2017  3:00 PM   Established Patient Pul with ILAN Valdes   [facility] (--)    [address]  [address]  [place] [address] [phone]            Oct 03, 2017  1:00 PM   Follow Up Visit with Shauna Lorenzo M.D.   [facility] (--)    [address], [address]  [place] [address] [phone]           You will be receiving a confirmation call a few days " before your appointment from our automated call confirmation system.              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   12/14/2012 - Present    Kidney damage S37.009A   1/29/2013 - Present    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   7/2/2013 - Present    Elevated coronary artery calcium score R93.1 High  2/20/2014 - Present    End stage renal disease (HCC) N18.6   3/24/2014 - Present    Dyslipidemia E78.5 Low  12/5/2014 - Present    Pigmented skin lesion L81.9   3/18/2015 - Present    Basal cell carcinoma of left cheek C44.319   3/26/2015 - Present    Anemia in CKD (chronic kidney disease) N18.9, D63.1   5/29/2015 - Present    Leg pain, bilateral M79.604, M79.605   8/10/2015 - Present    Primary osteoarthritis of both hands M19.041, M19.042   8/20/2015 - Present    Chronic gout M1A.9XX0   1/7/2016 - Present    Respiratory failure (CMS-HCC) J96.90   5/8/2016 - Present    Hyperkalemia, diminished renal excretion E87.5   5/8/2016 - Present    Pneumonia J18.9   5/8/2016 - Present    Pulmonary edema J81.1   5/8/2016 - Present    Anemia D64.9   5/8/2016 - Present    Leukocytosis D72.829   5/8/2016 - Present    COPD exacerbation (CMS-HCC) J44.1   5/8/2016 - Present    ESRD (end stage renal disease) on dialysis (CMS-HCC) N18.6, Z99.2   5/8/2016 - Present    Fluid overload E87.70   5/8/2016 - Present    Systolic CHF, acute (CMS-HCC) I50.21   5/11/2016 - Present    HTN (hypertension), malignant I10    5/11/2016 - Present    Bacteremia due to Staphylococcus aureus R78.81   5/11/2016 - Present    Hyperkalemia E87.5   5/11/2016 - Present    Renal cyst N28.1   5/11/2016 - Present    ESRD (end stage renal disease) (CMS-HCC) N18.6   8/12/2016 - Present    Hypotension I95.9 Medium  8/13/2016 - Present    Need for prophylactic vaccination with combined vaccine Z23   9/8/2016 - Present    AVM (arteriovenous malformation) Q27.30   9/8/2016 - Present    Dyspnea on exertion R06.09   3/16/2017 - Present    Nocturnal hypoxemia G47.34   3/21/2017 - Present    Pulmonary hypertension (CMS-HCC) I27.2   3/21/2017 - Present    Pedal edema R60.0   3/21/2017 - Present    Acute neck pain M54.2   4/15/2017 - Present    Bacteremia due to Gram-positive bacteria A49.9   4/16/2017 - Present    Pericardial effusion I31.3 Medium  4/20/2017 - Present    Aortic valve sclerosis I35.8 Medium  4/20/2017 - Present    Cervical discitis M46.42   4/28/2017 - Present      Health Maintenance        Date Due Completion Dates    IMM DTaP/Tdap/Td Vaccine (1 - Tdap) 7/30/1961 ---    IMM PNEUMOCOCCAL 65+ (ADULT) HIGH/HIGHEST RISK SERIES (2 of 2 - PCV13) 9/8/2017 9/8/2016    COLONOSCOPY 8/15/2026 8/15/2016, 9/16/2013            Current Immunizations     Influenza TIV (IM) 10/1/2013    Pneumococcal polysaccharide vaccine (PPSV-23) 9/8/2016    SHINGLES VACCINE 10/11/2015      Below and/or attached are the medications your provider expects you to take. Review all of your home medications and newly ordered medications with your provider and/or pharmacist. Follow medication instructions as directed by your provider and/or pharmacist. Please keep your medication list with you and share with your provider. Update the information when medications are discontinued, doses are changed, or new medications (including over-the-counter products) are added; and carry medication information at all times in the event of emergency situations     Allergies:  No Known Allergies             Medications  Valid as of: June 27, 2017 -  1:27 PM    Generic Name Brand Name Tablet Size Instructions for use    AmLODIPine Besylate (Tab) NORVASC 10 MG TAKE ONE TABLET BY MOUTH DAILY        B Complex-C-Folic Acid (Tab) DIALYVITE TABLET  TAKE ONE TABLET BY MOUTH DAILY        Budesonide-Formoterol Fumarate (Aerosol) SYMBICORT 160-4.5 MCG/ACT Inhale 2 Puffs by mouth 2 Times a Day. Inhale 2 puffs by mouth twice daily. Rinse mouth after each use.        Calcium Acetate (Phos Binder) (Cap) Calcium Acetate (Phos Binder) 667 MG TAKE TWO CAPSULES BY MOUTH THREE TIMES A DAY WITH A MEAL        Epoetin Mitchel (Solution) EPOGEN,PROCRIT 2000 UNIT/ML Inject 1 mL as instructed every Monday, Wednesday, and Friday.        Fluticasone Propionate (Suspension) FLONASE 50 MCG/ACT PLACE ONE TO TWO SPRAYS IN EACH NOSTRIL EVERY DAY        Furosemide (Tab) LASIX 80 MG Take 1 Tab by mouth 2 times a day.        Labetalol HCl (Tab) NORMODYNE 300 MG Take 2 Tabs by mouth 2 times a day.        Lidocaine (Patch) LIDODERM 5 % Apply 1 Patch to skin as directed every 24 hours.        Lisinopril (Tab) PRINIVIL, ZESTRIL 40 MG Take 1 Tab by mouth every day.        Morphine Sulfate (Tab CR) MS CONTIN 30 MG Take 1 Tab by mouth every 12 hours.        Omeprazole (CAPSULE DELAYED RELEASE) PRILOSEC 40 MG TAKE ONE CAPSULE BY MOUTH DAILY (GENERIC FOR PRILOSEC)        Ondansetron (TABLET DISPERSIBLE) ZOFRAN ODT 4 MG DISSOLVE 1 TABLET BY MOUTH EVERY 6 HOURS AS NEEDED FOR NAUSEA        Oxycodone-Acetaminophen (Tab) PERCOCET 5-325 MG Take 1-2 Tabs by mouth every 6 hours as needed for Severe Pain.        Pravastatin Sodium (Tab) PRAVACHOL 10 MG Take 10 mg by mouth every evening.        Pravastatin Sodium (Tab) PRAVACHOL 20 MG TAKE ONE TABLET BY MOUTH DAILY        Spironolactone (Tab) ALDACTONE 25 MG Take 1 Tab by mouth every day.        Tamsulosin HCl (Cap) FLOMAX 0.4 MG TAKE TWO CAPSULES BY MOUTH DAILY 1/2 HOUR AFTER BREAKFAST        TiZANidine HCl (Tab)  ZANAFLEX 2 MG 1-2 tabs po qhs for neck muscle spasm        TraZODone HCl (Tab) DESYREL 150 MG TAKE TWO TABLETS BY MOUTH EVERY NIGHT AT BEDTIME (GENERIC FOR DESYREL)        Vancomycin HCl (MD ALERT... VANCOMYCIN) MD ALERT... vancomycin  1 Each by Other route pharmacy to dose.        .                 Medicines prescribed today were sent to:     [facility] #[id number] - [place], [place] - [address]    [address] [place] [address]    Phone: [phone] Fax: [phone]    Open 24 Hours?: No      Medication refill instructions:       If your prescription bottle indicates you have medication refills left, it is not necessary to call your provider’s office. Please contact your pharmacy and they will refill your medication.    If your prescription bottle indicates you do not have any refills left, you may request refills at any time through one of the following ways: The online [employer] system (except Urgent Care), by calling your provider’s office, or by asking your pharmacy to contact your provider’s office with a refill request. Medication refills are processed only during regular business hours and may not be available until the next business day. Your provider may request additional information or to have a follow-up visit with you prior to refilling your medication.   *Please Note: Medication refills are assigned a new Rx number when refilled electronically. Your pharmacy may indicate that no refills were authorized even though a new prescription for the same medication is available at the pharmacy. Please request the medicine by name with the pharmacy before contacting your provider for a refill.           MyChart Status: Patient Declined

## 2017-06-27 NOTE — Clinical Note
"     [facility]   [address]  [place] [address]  Phone: [phone]  Fax: [phone]              Parmjit Castelan  1940    Encounter Date: 6/27/2017    James Mendoza M.D.          PROGRESS NOTE:  Subjective:   Parmjit Castelan is a 76 y.o. male who presents today followup of his hypertension. He also was noted to have coronary calcification on his cardiac CT in 2010. He underwent a myocardial perfusion scan December 2014 which was unremarkable. He does have end-stage renal disease and is on chronic hemodialysis.    He has been having some increasing difficulty with edema. In addition, his blood pressure has been significantly elevated up to about 200 systolic. Even after dialysis he still has edema.    He is having difficulty with claudication at about a block and a half to 2 blocks. He is really not dyspneic with exertion but limited by his claudication. He has had no PND or orthopnea. `    He has had no PND, orthopnea, chest discomfort, palpitations or lightheadedness.    Past Medical History   Diagnosis Date   • BPH 7/14/2009   • HTN (hypertension) 1/15/2010   • Snoring    • COPD (chronic obstructive pulmonary disease) (CMS-HCC)    • Proteinuria    • COPD (chronic obstructive pulmonary disease) (CMS-HCC) 8/2/2011   • EMPHYSEMA    • Detached retina    • CKD (chronic kidney disease) stage 4, GFR 15-29 ml/min (CMS-HCC) 1/15/2010     end stage renal disease   • Kidney cyst    • On supplemental oxygen therapy    • Heart burn      occas   • Indigestion      occas   • Bronchitis 1/1/13   • CAD (coronary artery disease) 2/20/2014   • Sleep apnea      O2 PER CANULA  AT NIGHT 2l/m   • Dialysis      m,w,f davita   • CATARACT      sanjuanita surgery complete   • Basal cell carcinoma of left cheek 3/26/2015   • Leg pain, bilateral 8/10/2015   • Gout    • Pneumonia    • Anesthesia      \"needs more sedation\" (can sometimes hear MD during procedure)    • High cholesterol  "     Past Surgical History   Procedure Laterality Date   • Cataract phaco with iol  4/8/08     Performed by STACEY PHILLIPS at SURGERY SAME DAY [facility] ORS   • Av fistula creation  2/12/2010     Performed by DAVID CASON at SURGERY [facility] ORS   • Av fistula revision  2/19/2010     Performed by WILLIE HATCH at SURGERY [facility] ORS   • Av fistulogram  7/23/2010     Performed by DAVID CASON at SURGERY [facility] ORS   • Angioplasty balloon  7/23/2010     Performed by DAVID CASON at SURGERY [facility] ORS   • Av fistulogram  9/17/2010     Performed by DAVID CASON at SURGERY [facility] ORS   • Angioplasty balloon  9/17/2010     Performed by DAVID CASON at SURGERY [facility] ORS   • Vitrectomy posterior  1/18/2011     Performed by NAHUM GE at SURGERY SAME DAY [facility] ORS   • Scleral buckling  1/18/2011     Performed by NAHUM GE at SURGERY SAME DAY [facility] ORS   • Recovery  8/12/2011     Performed by SURGERY, IR-RECOVERY at SURGERY SAME DAY [facility] ORS   • Vitrectomy posterior  10/11/2011     Performed by NAHUM GE at SURGERY SAME DAY [facility] ORS   • Recovery  7/23/2012     Performed by SURGERY, IR-RECOVERY at SURGERY SAME DAY [facility] ORS   • Recovery  1/29/2013     Performed by Ir-Recovery Surgery at SURGERY SAME DAY [facility] ORS   • Recovery  7/2/2013     Performed by Ir-Recovery Surgery at SURGERY SAME DAY [facility] ORS   • Av fistula thrombolysis  7/2/2013     Performed by David Cason M.D. at SURGERY [facility] ORS   • Recovery  12/17/2013     Performed by Ir-Recovery Surgery at SURGERY SAME DAY [facility] ORS   • Recovery  3/24/2014     Performed by Ir-Recovery Surgery at SURGERY [facility]   • Recovery  7/29/2014     Performed by Ir-Recovery Surgery at SURGERY SAME DAY [facility]   • Recovery  3/24/2015     Performed by Recoveryonly Surgery at [place] [facility] [facility]   • Recovery  12/23/2015     Procedure: VASCULAR  CASE-MOSCOSO-LEFT ARM FISTULOGRAM WITH ANGIOPLASTY;  Surgeon: Elmo Surgery;  Location: SURGERY PRE-POST PROC UNIT [facility];  Service:    • Gastroscopy with biopsy  8/13/2016     Procedure: GASTROSCOPY WITH BIOPSY;  Surgeon: Jorge Leavitt M.D.;  Location: ENDOSCOPY [facility];  Service:    • Colonoscopy - endo  8/15/2016     Procedure: COLONOSCOPY - ENDO;  Surgeon: Mane Whatley M.D.;  Location: ENDOSCOPY [facility];  Service:      Family History   Problem Relation Age of Onset   • Lung Disease Father      Emphysema, resp failure   • Genitourinary () Maternal Aunt      hematuria   • Stroke Mother    • Hypertension Mother    • Hypertension Brother      History   Smoking status   • Former Smoker -- 1.00 packs/day for 40 years   • Types: Cigarettes   • Quit date: 01/01/2009   Smokeless tobacco   • Never Used     Comment: 1 pk a day for 35 yrs, QUIT JAN 1 2010     No Known Allergies  Outpatient Encounter Prescriptions as of 6/27/2017   Medication Sig Dispense Refill   • spironolactone (ALDACTONE) 25 MG Tab Take 1 Tab by mouth every day. 30 Tab 11   • lisinopril (PRINIVIL, ZESTRIL) 40 MG tablet Take 1 Tab by mouth every day. 90 Tab 3   • furosemide (LASIX) 80 MG Tab Take 1 Tab by mouth 2 times a day. 180 Tab 3   • labetalol (NORMODYNE) 300 MG Tab Take 2 Tabs by mouth 2 times a day. 120 Tab 11   • pravastatin (PRAVACHOL) 20 MG Tab TAKE ONE TABLET BY MOUTH DAILY 90 Tab 3   • tizanidine (ZANAFLEX) 2 MG tablet 1-2 tabs po qhs for neck muscle spasm 60 Tab 0   • oxycodone-acetaminophen (PERCOCET) 5-325 MG Tab Take 1-2 Tabs by mouth every 6 hours as needed for Severe Pain. 20 Tab 0   • trazodone (DESYREL) 150 MG Tab TAKE TWO TABLETS BY MOUTH EVERY NIGHT AT BEDTIME (GENERIC FOR DESYREL) 180 Tab 4   • ondansetron (ZOFRAN ODT) 4 MG TABLET DISPERSIBLE DISSOLVE 1 TABLET BY MOUTH EVERY 6 HOURS AS NEEDED FOR NAUSEA 30 Tab 4   • B Complex-C-Folic Acid (DIALYVITE TABLET) Tab TAKE ONE TABLET BY MOUTH DAILY 90 Tab 4   •  "tamsulosin (FLOMAX) 0.4 MG capsule TAKE TWO CAPSULES BY MOUTH DAILY 1/2 HOUR AFTER BREAKFAST 180 Cap 11   • Calcium Acetate, Phos Binder, 667 MG Cap TAKE TWO CAPSULES BY MOUTH THREE TIMES A DAY WITH A MEAL 180 Cap 11   • fluticasone (FLONASE) 50 MCG/ACT nasal spray PLACE ONE TO TWO SPRAYS IN EACH NOSTRIL EVERY DAY 1 Bottle 5   • omeprazole (PRILOSEC) 40 MG delayed-release capsule TAKE ONE CAPSULE BY MOUTH DAILY (GENERIC FOR PRILOSEC) 90 Cap 4   • epoetin hilario (EPOGEN,PROCRIT) 2000 UNIT/ML Solution Inject 1 mL as instructed every Monday, Wednesday, and Friday. 1 mL 0   • amlodipine (NORVASC) 10 MG Tab TAKE ONE TABLET BY MOUTH DAILY 90 Tab 4   • budesonide-formoterol (SYMBICORT) 160-4.5 MCG/ACT Aerosol Inhale 2 Puffs by mouth 2 Times a Day. Inhale 2 puffs by mouth twice daily. Rinse mouth after each use. 1 Inhaler 12   • lidocaine (LIDODERM) 5 % Patch Apply 1 Patch to skin as directed every 24 hours. (Patient not taking: Reported on 6/27/2017) 30 Patch 11   • Vancomycin HCl (MD ALERT... VANCOMYCIN) 1 Each by Other route pharmacy to dose. (Patient not taking: Reported on 6/27/2017) 1 Each 25   • morphine ER (MS CONTIN) 30 MG Tab CR tablet Take 1 Tab by mouth every 12 hours. (Patient not taking: Reported on 4/28/2017) 30 Tab 0   • pravastatin (PRAVACHOL) 10 MG Tab Take 10 mg by mouth every evening.       No facility-administered encounter medications on file as of 6/27/2017.     ROS       Objective:   /80 mmHg  Pulse 76  Ht 1.74 m (5' 8.5\")  Wt 72.576 kg (160 lb)  BMI 23.97 kg/m2  SpO2 88%    Physical Exam   Neck: No JVD present.   Cardiovascular: Normal rate and regular rhythm.  Exam reveals no gallop.    Murmur (grade 3/6 systolic at the base and grade 2-3/6 systolic at the apex.) heard.  Pulmonary/Chest: Effort normal. He has no rales.   Abdominal: Soft. There is no tenderness.   Musculoskeletal: He exhibits edema (3-4+ pretibial).     Lab Results   Component Value Date/Time    CHOLESTEROL,TOT 90* " 06/22/2017 07:55 AM    LDL 26 06/22/2017 07:55 AM    HDL 57 06/22/2017 07:55 AM    TRIGLYCERIDES 37 06/22/2017 07:55 AM       Lab Results   Component Value Date/Time    SODIUM 142 06/22/2017 07:55 AM    POTASSIUM 4.4 06/22/2017 07:55 AM    CHLORIDE 102 06/22/2017 07:55 AM    CO2 32 06/22/2017 07:55 AM    GLUCOSE 91 06/22/2017 07:55 AM    BUN 23* 06/22/2017 07:55 AM    CREATININE 5.60* 06/22/2017 07:55 AM     Lab Results   Component Value Date/Time    ALKALINE PHOSPHATASE 103* 06/22/2017 07:55 AM    AST(SGOT) 23 06/22/2017 07:55 AM    ALT(SGPT) 18 06/22/2017 07:55 AM    TOTAL BILIRUBIN 0.8 06/22/2017 07:55 AM      Echocardiogram:  CONCLUSIONS  Normal left ventricular chamber size.  Left ventricular ejection fraction is visually estimated to be 5%.  Mild mitral regurgitation.  Moderately dilated left atrium.  Aortic sclerosis without stenosis.  Mild tricuspid regurgitation.  Right ventricular systolic pressure is estimated to be 52-57 mmHg.  Dilated inferior vena cava with partial inspiratory collapse.  The right ventricle was normal in size and function.  Small pericardial effusion without evidence of hemodynamic compromise.    Exam Date:         05/09/2016         Assessment:     1. Elevated coronary artery calcium score     2. Essential hypertension     3. Dyslipidemia         Medical Decision Making:  Today's Assessment / Status / Plan:     Mr. Castelan is having difficulty with increasing edema and hypertension. I feel that he needs to have more fluid removed at dialysis. I will call his nephrologist to discuss this. If this does not improve his blood pressure, we can then decide whether or not to increase his antihypertensive therapy. His lipid status appears better excellent control. He will follow-up in 4-6 weeks.      Long Larson M.D.  [address]  [address]  [place] [address]  VIA In Basket

## 2017-07-10 ENCOUNTER — PATIENT OUTREACH (OUTPATIENT)
Dept: HEALTH INFORMATION MANAGEMENT | Facility: OTHER | Age: 77
End: 2017-07-10

## 2017-07-10 NOTE — PROGRESS NOTES
7/10/17  -   Outcome: Left Message    WebIZ Checked & Epic Updated:  yes    [facility] Verified: no    Attempt # 1

## 2017-07-10 NOTE — PROGRESS NOTES
Outcome: PATIENT REQUESTED TO CALL US BACK    WebIZ Checked & Epic Updated:  yes    [facility]Connect Verified: no    Attempt # 2ND

## 2017-07-19 ENCOUNTER — PATIENT OUTREACH (OUTPATIENT)
Dept: HEALTH INFORMATION MANAGEMENT | Facility: OTHER | Age: 77
End: 2017-07-19

## 2017-07-19 NOTE — PROGRESS NOTES
Parmjit was discharged from the  hospital on 04/20/2017 with a lace score of 30, for osteomyelitis. When patient discharged, he was ordered to follow up with cardiologist Dr. Jamil, pulmonologist Dr. Soriano, cardiologist Dr. Mendoza, PCP Dr. Aden/Shannon/Bo, infectious disease Dr. Whittaker, [facility]. Please see details below:  -Dr. Jamil 04/26/2017--Completed  -Dr. Aden 04/28/2017--Completed  -Dr. Ortega 05/10/2017--Cancelled--Rescheduled with Dr. Soriano 05/16/2017--Cancelled and not rescheduled.   -Dr. Whittaker 05/11/2017--Completed  -Dr. Whittaker 05/16/2017--Completed  -[facility] 05/16/2017--Patient cancelled and did not reschedule   -Dr. Whittaker 06/20/2017   Patient was not ordered services such as Home Health; however, patient had oxygen DME through [employer] since 2014. Patient still uses this equipment. All medications were obtained and [facility] Patient Advocate was able to engage with patient on several occasions regarding any appointment, medication or social resources.

## 2017-07-20 ENCOUNTER — OFFICE VISIT (OUTPATIENT)
Dept: CARDIOLOGY | Facility: MEDICAL CENTER | Age: 77
End: 2017-07-20
Payer: MEDICARE

## 2017-07-20 ENCOUNTER — TELEPHONE (OUTPATIENT)
Dept: CARDIOLOGY | Facility: MEDICAL CENTER | Age: 77
End: 2017-07-20

## 2017-07-20 VITALS
BODY MASS INDEX: 23.04 KG/M2 | SYSTOLIC BLOOD PRESSURE: 170 MMHG | OXYGEN SATURATION: 92 % | WEIGHT: 152 LBS | HEIGHT: 68 IN | HEART RATE: 68 BPM | DIASTOLIC BLOOD PRESSURE: 80 MMHG

## 2017-07-20 DIAGNOSIS — R93.1 ELEVATED CORONARY ARTERY CALCIUM SCORE: ICD-10-CM

## 2017-07-20 DIAGNOSIS — I10 ESSENTIAL HYPERTENSION: ICD-10-CM

## 2017-07-20 DIAGNOSIS — N18.6 END STAGE RENAL DISEASE (HCC): ICD-10-CM

## 2017-07-20 PROCEDURE — 99214 OFFICE O/P EST MOD 30 MIN: CPT | Performed by: INTERNAL MEDICINE

## 2017-07-20 NOTE — PROGRESS NOTES
"Subjective:   Parmjit Castelan is a 76 y.o. male who presents today followup of his hypertension. He also was noted to have coronary calcification on his cardiac CT in 2010. He underwent a myocardial perfusion scan December 2014 which was unremarkable. He does have end-stage renal disease and is on chronic hemodialysis.    He was having significant difficulty with edema in his lower extremities. I discussed this with his nephrologist and they have been more aggressive with his volume status. His edema has almost resolved completely.    His blood pressures pre-dialysis of been running approximately 155/65. There are lower postdialysis. He is noted no lightheadedness or cramping.    He's had no chest discomfort, dyspnea, palpitations or lightheadedness.    Past Medical History   Diagnosis Date   • BPH 7/14/2009   • HTN (hypertension) 1/15/2010   • Snoring    • COPD (chronic obstructive pulmonary disease) (CMS-HCC)    • Proteinuria    • COPD (chronic obstructive pulmonary disease) (CMS-HCC) 8/2/2011   • EMPHYSEMA    • Detached retina    • CKD (chronic kidney disease) stage 4, GFR 15-29 ml/min (CMS-HCC) 1/15/2010     end stage renal disease   • Kidney cyst    • On supplemental oxygen therapy    • Heart burn      occas   • Indigestion      occas   • Bronchitis 1/1/13   • CAD (coronary artery disease) 2/20/2014   • Sleep apnea      O2 PER CANULA  AT NIGHT 2l/m   • Dialysis      brian,w,f juliann   • CATARACT      sanjuanita surgery complete   • Basal cell carcinoma of left cheek 3/26/2015   • Leg pain, bilateral 8/10/2015   • Gout    • Pneumonia    • Anesthesia      \"needs more sedation\" (can sometimes hear MD during procedure)    • High cholesterol      Past Surgical History   Procedure Laterality Date   • Cataract phaco with iol  4/8/08     Performed by STACEY PHILLIPS at SURGERY SAME DAY [facility] ORS   • Av fistula creation  2/12/2010     Performed by ALESHIA MOSCOSO at SURGERY [facility] ORS   • Av fistula revision  2/19/2010 "     Performed by WILLIE HATCH at SURGERY [facility] ORS   • Av fistulogram  7/23/2010     Performed by DAVID CASON at SURGERY [facility] ORS   • Angioplasty balloon  7/23/2010     Performed by DAVID CASON at SURGERY [facility] ORS   • Av fistulogram  9/17/2010     Performed by DAVID CASON at SURGERY [facility] ORS   • Angioplasty balloon  9/17/2010     Performed by DAVID CASON at SURGERY [facility] ORS   • Vitrectomy posterior  1/18/2011     Performed by NAHUM GE at SURGERY SAME DAY [facility] ORS   • Scleral buckling  1/18/2011     Performed by NAHUM GE at SURGERY SAME DAY [facility] ORS   • Recovery  8/12/2011     Performed by SURGERY, IR-RECOVERY at SURGERY SAME DAY [facility] ORS   • Vitrectomy posterior  10/11/2011     Performed by NAHUM GE at SURGERY SAME DAY [facility] ORS   • Recovery  7/23/2012     Performed by SURGERY, IR-RECOVERY at SURGERY SAME DAY [facility] ORS   • Recovery  1/29/2013     Performed by Ir-Recovery Surgery at SURGERY SAME DAY [facility] ORS   • Recovery  7/2/2013     Performed by Ir-Recovery Surgery at SURGERY SAME DAY [facility] ORS   • Av fistula thrombolysis  7/2/2013     Performed by David Cason M.D. at SURGERY [facility]   • Recovery  12/17/2013     Performed by Ir-Recovery Surgery at SURGERY SAME DAY [facility] ORS   • Recovery  3/24/2014     Performed by Ir-Recovery Surgery at SURGERY [facility]   • Recovery  7/29/2014     Performed by Ir-Recovery Surgery at SURGERY SAME DAY [facility] ORS   • Recovery  3/24/2015     Performed by Recoveryonly Surgery at SURGERY PRE-POST PROC UNIT [facility]   • Recovery  12/23/2015     Procedure: VASCULAR CASE-CASON-LEFT ARM FISTULOGRAM WITH ANGIOPLASTY;  Surgeon: Recoveryonly Surgery;  Location: SURGERY PRE-POST PROC UNIT [facility];  Service:    • Gastroscopy with biopsy  8/13/2016     Procedure: GASTROSCOPY WITH BIOPSY;  Surgeon: Jorge Leavitt M.D.;  Location: ENDOSCOPY [facility];   Service:    • Colonoscopy - endo  8/15/2016     Procedure: COLONOSCOPY - ENDO;  Surgeon: Mane Whatley M.D.;  Location: ENDOSCOPY [facility];  Service:      Family History   Problem Relation Age of Onset   • Lung Disease Father      Emphysema, resp failure   • Genitourinary () Maternal Aunt      hematuria   • Stroke Mother    • Hypertension Mother    • Hypertension Brother      History   Smoking status   • Former Smoker -- 1.00 packs/day for 40 years   • Types: Cigarettes   • Quit date: 01/01/2009   Smokeless tobacco   • Never Used     Comment: 1 pk a day for 35 yrs, QUIT JAN 1 2010     No Known Allergies  Outpatient Encounter Prescriptions as of 7/20/2017   Medication Sig Dispense Refill   • DOXAZOSIN MESYLATE PO Take  by mouth.     • tizanidine (ZANAFLEX) 2 MG tablet TAKE ONE TO TWO TABLETS BY MOUTH EVERY NIGHT AT BEDTIME FOR NECK MUSCLE SPASM. (ZANAFLEX) 180 Tab 0   • spironolactone (ALDACTONE) 25 MG Tab Take 1 Tab by mouth every day. 30 Tab 11   • lisinopril (PRINIVIL, ZESTRIL) 40 MG tablet Take 1 Tab by mouth every day. 90 Tab 3   • labetalol (NORMODYNE) 300 MG Tab Take 2 Tabs by mouth 2 times a day. 120 Tab 11   • pravastatin (PRAVACHOL) 20 MG Tab TAKE ONE TABLET BY MOUTH DAILY 90 Tab 3   • oxycodone-acetaminophen (PERCOCET) 5-325 MG Tab Take 1-2 Tabs by mouth every 6 hours as needed for Severe Pain. 20 Tab 0   • trazodone (DESYREL) 150 MG Tab TAKE TWO TABLETS BY MOUTH EVERY NIGHT AT BEDTIME (GENERIC FOR DESYREL) 180 Tab 4   • ondansetron (ZOFRAN ODT) 4 MG TABLET DISPERSIBLE DISSOLVE 1 TABLET BY MOUTH EVERY 6 HOURS AS NEEDED FOR NAUSEA 30 Tab 4   • B Complex-C-Folic Acid (DIALYVITE TABLET) Tab TAKE ONE TABLET BY MOUTH DAILY 90 Tab 4   • tamsulosin (FLOMAX) 0.4 MG capsule TAKE TWO CAPSULES BY MOUTH DAILY 1/2 HOUR AFTER BREAKFAST 180 Cap 11   • Calcium Acetate, Phos Binder, 667 MG Cap TAKE TWO CAPSULES BY MOUTH THREE TIMES A DAY WITH A MEAL 180 Cap 11   • fluticasone (FLONASE) 50 MCG/ACT nasal spray  "PLACE ONE TO TWO SPRAYS IN EACH NOSTRIL EVERY DAY 1 Bottle 5   • omeprazole (PRILOSEC) 40 MG delayed-release capsule TAKE ONE CAPSULE BY MOUTH DAILY (GENERIC FOR PRILOSEC) 90 Cap 4   • epoetin hilario (EPOGEN,PROCRIT) 2000 UNIT/ML Solution Inject 1 mL as instructed every Monday, Wednesday, and Friday. 1 mL 0   • amlodipine (NORVASC) 10 MG Tab TAKE ONE TABLET BY MOUTH DAILY 90 Tab 4   • budesonide-formoterol (SYMBICORT) 160-4.5 MCG/ACT Aerosol Inhale 2 Puffs by mouth 2 Times a Day. Inhale 2 puffs by mouth twice daily. Rinse mouth after each use. 1 Inhaler 12   • furosemide (LASIX) 80 MG Tab Take 1 Tab by mouth 2 times a day. 180 Tab 3   • lidocaine (LIDODERM) 5 % Patch Apply 1 Patch to skin as directed every 24 hours. (Patient not taking: Reported on 6/27/2017) 30 Patch 11   • Vancomycin HCl (MD ALERT... VANCOMYCIN) 1 Each by Other route pharmacy to dose. (Patient not taking: Reported on 6/27/2017) 1 Each 25   • morphine ER (MS CONTIN) 30 MG Tab CR tablet Take 1 Tab by mouth every 12 hours. (Patient not taking: Reported on 4/28/2017) 30 Tab 0   • pravastatin (PRAVACHOL) 10 MG Tab Take 10 mg by mouth every evening.       No facility-administered encounter medications on file as of 7/20/2017.     ROS       Objective:   /80 mmHg  Pulse 68  Ht 1.727 m (5' 7.99\")  Wt 68.947 kg (152 lb)  BMI 23.12 kg/m2  SpO2 92%    Physical Exam   Neck: No JVD present.   Cardiovascular: Normal rate and regular rhythm.  Exam reveals no gallop.    Murmur (grade 3/6 systolic at the base and grade 2-3/6 systolic at the apex.) heard.  Pulmonary/Chest: Effort normal. He has no rales.   Abdominal: Soft. There is no tenderness.   Musculoskeletal: He exhibits edema (trace to 1+ pretibial).     Lab Results   Component Value Date/Time    CHOLESTEROL,TOT 90* 06/22/2017 07:55 AM    LDL 26 06/22/2017 07:55 AM    HDL 57 06/22/2017 07:55 AM    TRIGLYCERIDES 37 06/22/2017 07:55 AM       Lab Results   Component Value Date/Time    SODIUM 142 " 06/22/2017 07:55 AM    POTASSIUM 4.4 06/22/2017 07:55 AM    CHLORIDE 102 06/22/2017 07:55 AM    CO2 32 06/22/2017 07:55 AM    GLUCOSE 91 06/22/2017 07:55 AM    BUN 23* 06/22/2017 07:55 AM    CREATININE 5.60* 06/22/2017 07:55 AM     Lab Results   Component Value Date/Time    ALKALINE PHOSPHATASE 103* 06/22/2017 07:55 AM    AST(SGOT) 23 06/22/2017 07:55 AM    ALT(SGPT) 18 06/22/2017 07:55 AM    TOTAL BILIRUBIN 0.8 06/22/2017 07:55 AM      Echocardiogram:  CONCLUSIONS  Normal left ventricular chamber size.  Left ventricular ejection fraction is visually estimated to be 5%.  Mild mitral regurgitation.  Moderately dilated left atrium.  Aortic sclerosis without stenosis.  Mild tricuspid regurgitation.  Right ventricular systolic pressure is estimated to be 52-57 mmHg.  Dilated inferior vena cava with partial inspiratory collapse.  The right ventricle was normal in size and function.  Small pericardial effusion without evidence of hemodynamic compromise.    Exam Date:         05/09/2016         Assessment:     1. Elevated coronary artery calcium score     2. Essential hypertension     3. End stage renal disease (HCC)         Medical Decision Making:  Today's Assessment / Status / Plan:     Mr. Castelan has had significant improvement in his edema and hypertension. His blood pressure is up slightly prior to dialysis but low post dialysis. At the present time, I feel he should continue on his current medications. We'll reassess the situation and 6 months. He'll call if his blood pressures are frequent greater than 160/90. His blood pressure is up today but he was rushing to get in here today and was afraid he would be late.

## 2017-07-20 NOTE — TELEPHONE ENCOUNTER
----- Message from Anmol Lopez, Med Ass't sent at 7/20/2017  3:06 PM PDT -----  Regarding: Lab questions  Contact: [phone]  Parmjit Tilley wanted to know if Dr. Mendoza wanted him to have labs done before his next appointment on 01/09.    He would like a call back at: [phone].    Thank you so much,    Anmol

## 2017-07-20 NOTE — TELEPHONE ENCOUNTER
Labs were not ordered by Dr. Mendoza at the patient's appointment today (7/20/2017).    Left patient a voicemail advising him of the above.    WILD BYRD

## 2017-07-20 NOTE — MR AVS SNAPSHOT
"        Parmjit Castelan   2017 1:15 PM   Office Visit   MRN: [id number]    Department:  [facility]   Dept Phone:  [phone]    Description:  Male : 1940   Provider:  James Mendoza M.D.           Reason for Visit     Follow-Up           Allergies as of 2017     No Known Allergies      You were diagnosed with     Elevated coronary artery calcium score   [9898872]       Essential hypertension   [6692129]       End stage renal disease (CMS-HCC)   [585.6.ICD-9-CM]         Vital Signs     Blood Pressure Pulse Height Weight Body Mass Index Oxygen Saturation    170/80 mmHg 68 1.727 m (5' 7.99\") 68.947 kg (152 lb) 23.12 kg/m2 92%    Smoking Status                   Former Smoker           Basic Information     Date Of Birth Sex Race Ethnicity Preferred Language    1940 Male White Non- English      Your appointments     2017 11:00 AM   Follow Up Visit with Yessenia Senior M.D.   [address])    [address] [place] [id number] [phone]           You will be receiving a confirmation call a few days before your appointment from our automated call confirmation system.            Oct 03, 2017  1:00 PM   Follow Up Visit with Shauna Lorenzo M.D.   [facility] (--)    [address] [facility] [facility] [address]  [facility] [address] [phone]           You will be receiving a confirmation call a few days before your appointment from our automated call confirmation system.              Problem List              ICD-10-CM Priority Class Noted - Resolved    BPH (benign prostatic hypertrophy) N40.0 Low  2009 - Present    Essential hypertension I10 High  1/15/2010 - Present    Hard of hearing H91.90   2011 - Present    Preventative health care Z00.00   2011 - Present    HELGA (obstructive sleep apnea) G47.33   2011 - Present    COPD (chronic obstructive pulmonary disease) (CMS-HCC) J44.9   2011 - Present    BPH " (benign prostatic hyperplasia) N40.0   1/13/2012 - Present    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   12/14/2012 - Present    Kidney damage S37.009A   1/29/2013 - Present    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   7/2/2013 - Present    Elevated coronary artery calcium score R93.1 High  2/20/2014 - Present    End stage renal disease (HCC) N18.6   3/24/2014 - Present    Dyslipidemia E78.5 Low  12/5/2014 - Present    Pigmented skin lesion L81.9   3/18/2015 - Present    Basal cell carcinoma of left cheek C44.319   3/26/2015 - Present    Anemia in CKD (chronic kidney disease) N18.9, D63.1   5/29/2015 - Present    Leg pain, bilateral M79.604, M79.605   8/10/2015 - Present    Primary osteoarthritis of both hands M19.041, M19.042   8/20/2015 - Present    Chronic gout M1A.9XX0   1/7/2016 - Present    Respiratory failure (CMS-HCC) J96.90   5/8/2016 - Present    Hyperkalemia, diminished renal excretion E87.5   5/8/2016 - Present    Pneumonia J18.9   5/8/2016 - Present    Pulmonary edema J81.1   5/8/2016 - Present    Anemia D64.9   5/8/2016 - Present    Leukocytosis D72.829   5/8/2016 - Present    COPD exacerbation (CMS-HCC) J44.1   5/8/2016 - Present    ESRD (end stage renal disease) on dialysis (CMS-HCC) N18.6, Z99.2   5/8/2016 - Present    Fluid overload E87.70   5/8/2016 - Present    Systolic CHF, acute (CMS-HCC) I50.21   5/11/2016 - Present    HTN (hypertension), malignant I10   5/11/2016 - Present    Bacteremia due to Staphylococcus aureus R78.81   5/11/2016 - Present    Hyperkalemia E87.5   5/11/2016 - Present    Renal cyst N28.1   5/11/2016 - Present    ESRD (end stage renal disease) (CMS-HCC) N18.6   8/12/2016 - Present    Hypotension I95.9 Medium  8/13/2016 - Present    Need for prophylactic vaccination with combined vaccine Z23   9/8/2016 - Present    AVM (arteriovenous malformation) Q27.30   9/8/2016 - Present    Dyspnea on exertion R06.09   3/16/2017 - Present    Nocturnal hypoxemia G47.34    3/21/2017 - Present    Pulmonary hypertension (CMS-[facility]) I27.2   3/21/2017 - Present    Pedal edema R60.0   3/21/2017 - Present    Acute neck pain M54.2   4/15/2017 - Present    Bacteremia due to Gram-positive bacteria A49.9   4/16/2017 - Present    Pericardial effusion I31.3 Medium  4/20/2017 - Present    Aortic valve sclerosis I35.8 Medium  4/20/2017 - Present    Cervical discitis M46.42   4/28/2017 - Present      Health Maintenance        Date Due Completion Dates    IMM DTaP/Tdap/Td Vaccine (1 - Tdap) 7/30/1961 ---    IMM INFLUENZA (1) 9/1/2017 10/1/2013    IMM PNEUMOCOCCAL 65+ (ADULT) HIGH/HIGHEST RISK SERIES (2 of 2 - PCV13) 9/8/2017 9/8/2016    COLONOSCOPY 8/15/2026 8/15/2016, 9/16/2013            Current Immunizations     Influenza TIV (IM) 10/1/2013    Pneumococcal polysaccharide vaccine (PPSV-23) 9/8/2016    SHINGLES VACCINE 10/11/2015      Below and/or attached are the medications your provider expects you to take. Review all of your home medications and newly ordered medications with your provider and/or pharmacist. Follow medication instructions as directed by your provider and/or pharmacist. Please keep your medication list with you and share with your provider. Update the information when medications are discontinued, doses are changed, or new medications (including over-the-counter products) are added; and carry medication information at all times in the event of emergency situations     Allergies:  No Known Allergies          Medications  Valid as of: July 20, 2017 -  1:49 PM    Generic Name Brand Name Tablet Size Instructions for use    AmLODIPine Besylate (Tab) NORVASC 10 MG TAKE ONE TABLET BY MOUTH DAILY        B Complex-C-Folic Acid (Tab) DIALYVITE TABLET  TAKE ONE TABLET BY MOUTH DAILY        Budesonide-Formoterol Fumarate (Aerosol) SYMBICORT 160-4.5 MCG/ACT Inhale 2 Puffs by mouth 2 Times a Day. Inhale 2 puffs by mouth twice daily. Rinse mouth after each use.        Calcium Acetate (Phos  Binder) (Cap) Calcium Acetate (Phos Binder) 667 MG TAKE TWO CAPSULES BY MOUTH THREE TIMES A DAY WITH A MEAL        Doxazosin Mesylate   Take  by mouth.        Epoetin [place] (Solution) EPOGEN,PROCRIT 2000 UNIT/ML Inject 1 mL as instructed every Monday, Wednesday, and Friday.        Fluticasone Propionate (Suspension) FLONASE 50 MCG/ACT PLACE ONE TO TWO SPRAYS IN EACH NOSTRIL EVERY DAY        Furosemide (Tab) LASIX 80 MG Take 1 Tab by mouth 2 times a day.        Labetalol HCl (Tab) NORMODYNE 300 MG Take 2 Tabs by mouth 2 times a day.        Lidocaine (Patch) LIDODERM 5 % Apply 1 Patch to skin as directed every 24 hours.        Lisinopril (Tab) PRINIVIL, ZESTRIL 40 MG Take 1 Tab by mouth every day.        Morphine Sulfate (Tab CR) MS CONTIN 30 MG Take 1 Tab by mouth every 12 hours.        Omeprazole (CAPSULE DELAYED RELEASE) PRILOSEC 40 MG TAKE ONE CAPSULE BY MOUTH DAILY (GENERIC FOR PRILOSEC)        Ondansetron (TABLET DISPERSIBLE) ZOFRAN ODT 4 MG DISSOLVE 1 TABLET BY MOUTH EVERY 6 HOURS AS NEEDED FOR NAUSEA        Oxycodone-Acetaminophen (Tab) PERCOCET 5-325 MG Take 1-2 Tabs by mouth every 6 hours as needed for Severe Pain.        Pravastatin Sodium (Tab) PRAVACHOL 10 MG Take 10 mg by mouth every evening.        Pravastatin Sodium (Tab) PRAVACHOL 20 MG TAKE ONE TABLET BY MOUTH DAILY        Spironolactone (Tab) ALDACTONE 25 MG Take 1 Tab by mouth every day.        Tamsulosin HCl (Cap) FLOMAX 0.4 MG TAKE TWO CAPSULES BY MOUTH DAILY 1/2 HOUR AFTER BREAKFAST        TiZANidine HCl (Tab) ZANAFLEX 2 MG TAKE ONE TO TWO TABLETS BY MOUTH EVERY NIGHT AT BEDTIME FOR NECK MUSCLE SPASM. (ZANAFLEX)        TraZODone HCl (Tab) DESYREL 150 MG TAKE TWO TABLETS BY MOUTH EVERY NIGHT AT BEDTIME (GENERIC FOR DESYREL)        Vancomycin HCl (MD ALERT... VANCOMYCIN) MD ALERT... vancomycin  1 Each by Other route pharmacy to dose.        .                 Medicines prescribed today were sent to:     [facility][id number] [address]  [address] [place] [address]    Phone: [phone] Fax: [phone]    Open 24 Hours?: No      Medication refill instructions:       If your prescription bottle indicates you have medication refills left, it is not necessary to call your provider’s office. Please contact your pharmacy and they will refill your medication.    If your prescription bottle indicates you do not have any refills left, you may request refills at any time through one of the following ways: The online [employer] system (except Urgent Care), by calling your provider’s office, or by asking your pharmacy to contact your provider’s office with a refill request. Medication refills are processed only during regular business hours and may not be available until the next business day. Your provider may request additional information or to have a follow-up visit with you prior to refilling your medication.   *Please Note: Medication refills are assigned a new Rx number when refilled electronically. Your pharmacy may indicate that no refills were authorized even though a new prescription for the same medication is available at the pharmacy. Please request the medicine by name with the pharmacy before contacting your provider for a refill.           [employer] Status: Patient Declined

## 2017-07-25 ENCOUNTER — APPOINTMENT (OUTPATIENT)
Dept: RADIOLOGY | Facility: MEDICAL CENTER | Age: 77
End: 2017-07-25
Attending: SURGERY
Payer: MEDICARE

## 2017-07-25 ENCOUNTER — APPOINTMENT (OUTPATIENT)
Dept: PULMONOLOGY | Facility: HOSPICE | Age: 77
End: 2017-07-25
Payer: MEDICARE

## 2017-07-25 ENCOUNTER — HOSPITAL ENCOUNTER (OUTPATIENT)
Facility: MEDICAL CENTER | Age: 77
End: 2017-07-25
Attending: SURGERY | Admitting: SURGERY
Payer: MEDICARE

## 2017-07-25 VITALS
SYSTOLIC BLOOD PRESSURE: 184 MMHG | WEIGHT: 152 LBS | OXYGEN SATURATION: 96 % | BODY MASS INDEX: 22.51 KG/M2 | HEIGHT: 69 IN | RESPIRATION RATE: 16 BRPM | TEMPERATURE: 97.4 F | DIASTOLIC BLOOD PRESSURE: 60 MMHG | HEART RATE: 65 BPM

## 2017-07-25 DIAGNOSIS — N18.5 CHRONIC KIDNEY DISEASE, STAGE V (HCC): ICD-10-CM

## 2017-07-25 LAB
ANION GAP SERPL CALC-SCNC: 11 MMOL/L (ref 0–11.9)
BASOPHILS # BLD AUTO: 0.8 % (ref 0–1.8)
BASOPHILS # BLD: 0.03 K/UL (ref 0–0.12)
BUN SERPL-MCNC: 40 MG/DL (ref 8–22)
CALCIUM SERPL-MCNC: 9.8 MG/DL (ref 8.5–10.5)
CHLORIDE SERPL-SCNC: 100 MMOL/L (ref 96–112)
CO2 SERPL-SCNC: 29 MMOL/L (ref 20–33)
CREAT SERPL-MCNC: 7.37 MG/DL (ref 0.5–1.4)
EOSINOPHIL # BLD AUTO: 0.23 K/UL (ref 0–0.51)
EOSINOPHIL NFR BLD: 6.1 % (ref 0–6.9)
ERYTHROCYTE [DISTWIDTH] IN BLOOD BY AUTOMATED COUNT: 61.1 FL (ref 35.9–50)
GFR SERPL CREATININE-BSD FRML MDRD: 7 ML/MIN/1.73 M 2
GLUCOSE SERPL-MCNC: 78 MG/DL (ref 65–99)
HCT VFR BLD AUTO: 35.6 % (ref 42–52)
HGB BLD-MCNC: 11.4 G/DL (ref 14–18)
IMM GRANULOCYTES # BLD AUTO: 0.01 K/UL (ref 0–0.11)
IMM GRANULOCYTES NFR BLD AUTO: 0.3 % (ref 0–0.9)
LYMPHOCYTES # BLD AUTO: 0.44 K/UL (ref 1–4.8)
LYMPHOCYTES NFR BLD: 11.7 % (ref 22–41)
MCH RBC QN AUTO: 31.1 PG (ref 27–33)
MCHC RBC AUTO-ENTMCNC: 32 G/DL (ref 33.7–35.3)
MCV RBC AUTO: 97.3 FL (ref 81.4–97.8)
MONOCYTES # BLD AUTO: 0.34 K/UL (ref 0–0.85)
MONOCYTES NFR BLD AUTO: 9 % (ref 0–13.4)
NEUTROPHILS # BLD AUTO: 2.71 K/UL (ref 1.82–7.42)
NEUTROPHILS NFR BLD: 72.1 % (ref 44–72)
NRBC # BLD AUTO: 0 K/UL
NRBC BLD AUTO-RTO: 0 /100 WBC
PLATELET # BLD AUTO: 183 K/UL (ref 164–446)
PMV BLD AUTO: 9.7 FL (ref 9–12.9)
POTASSIUM SERPL-SCNC: 5.9 MMOL/L (ref 3.6–5.5)
RBC # BLD AUTO: 3.66 M/UL (ref 4.7–6.1)
SODIUM SERPL-SCNC: 140 MMOL/L (ref 135–145)
WBC # BLD AUTO: 3.8 K/UL (ref 4.8–10.8)

## 2017-07-25 PROCEDURE — 700111 HCHG RX REV CODE 636 W/ 250 OVERRIDE (IP): Performed by: SURGERY

## 2017-07-25 PROCEDURE — 700111 HCHG RX REV CODE 636 W/ 250 OVERRIDE (IP)

## 2017-07-25 PROCEDURE — 80048 BASIC METABOLIC PNL TOTAL CA: CPT

## 2017-07-25 PROCEDURE — 36902 INTRO CATH DIALYSIS CIRCUIT: CPT

## 2017-07-25 PROCEDURE — 99153 MOD SED SAME PHYS/QHP EA: CPT

## 2017-07-25 PROCEDURE — 85025 COMPLETE CBC W/AUTO DIFF WBC: CPT

## 2017-07-25 PROCEDURE — 700101 HCHG RX REV CODE 250

## 2017-07-25 PROCEDURE — 160002 HCHG RECOVERY MINUTES (STAT)

## 2017-07-25 RX ORDER — HYDRALAZINE HYDROCHLORIDE 20 MG/ML
INJECTION INTRAMUSCULAR; INTRAVENOUS
Status: COMPLETED
Start: 2017-07-25 | End: 2017-07-25

## 2017-07-25 RX ORDER — MIDAZOLAM HYDROCHLORIDE 1 MG/ML
.5-2 INJECTION INTRAMUSCULAR; INTRAVENOUS PRN
Status: DISCONTINUED | OUTPATIENT
Start: 2017-07-25 | End: 2017-07-25 | Stop reason: HOSPADM

## 2017-07-25 RX ORDER — SODIUM CHLORIDE 9 MG/ML
500 INJECTION, SOLUTION INTRAVENOUS
Status: DISCONTINUED | OUTPATIENT
Start: 2017-07-25 | End: 2017-07-25 | Stop reason: HOSPADM

## 2017-07-25 RX ORDER — HYDRALAZINE HYDROCHLORIDE 20 MG/ML
20 INJECTION INTRAMUSCULAR; INTRAVENOUS ONCE
Status: COMPLETED | OUTPATIENT
Start: 2017-07-25 | End: 2017-07-25

## 2017-07-25 RX ORDER — MIDAZOLAM HYDROCHLORIDE 1 MG/ML
INJECTION INTRAMUSCULAR; INTRAVENOUS
Status: COMPLETED
Start: 2017-07-25 | End: 2017-07-25

## 2017-07-25 RX ORDER — LIDOCAINE HYDROCHLORIDE 10 MG/ML
INJECTION, SOLUTION INFILTRATION; PERINEURAL
Status: COMPLETED
Start: 2017-07-25 | End: 2017-07-25

## 2017-07-25 RX ORDER — OXYCODONE HYDROCHLORIDE 5 MG/1
2.5 TABLET ORAL
Status: DISCONTINUED | OUTPATIENT
Start: 2017-07-25 | End: 2017-07-25 | Stop reason: HOSPADM

## 2017-07-25 RX ORDER — HYDRALAZINE HYDROCHLORIDE 20 MG/ML
20 INJECTION INTRAMUSCULAR; INTRAVENOUS
Status: DISCONTINUED | OUTPATIENT
Start: 2017-07-25 | End: 2017-07-25 | Stop reason: HOSPADM

## 2017-07-25 RX ADMIN — HYDRALAZINE HYDROCHLORIDE 20 MG: 20 INJECTION, SOLUTION INTRAMUSCULAR; INTRAVENOUS at 11:42

## 2017-07-25 RX ADMIN — HYDRALAZINE HYDROCHLORIDE 20 MG: 20 INJECTION INTRAMUSCULAR; INTRAVENOUS at 12:31

## 2017-07-25 RX ADMIN — MIDAZOLAM 1 MG: 1 INJECTION INTRAMUSCULAR; INTRAVENOUS at 12:01

## 2017-07-25 RX ADMIN — MIDAZOLAM 1 MG: 1 INJECTION INTRAMUSCULAR; INTRAVENOUS at 11:39

## 2017-07-25 RX ADMIN — LIDOCAINE HYDROCHLORIDE: 10 INJECTION, SOLUTION INFILTRATION; PERINEURAL at 11:47

## 2017-07-25 RX ADMIN — HYDRALAZINE HYDROCHLORIDE 20 MG: 20 INJECTION INTRAMUSCULAR; INTRAVENOUS at 11:42

## 2017-07-25 ASSESSMENT — PAIN SCALES - GENERAL
PAINLEVEL_OUTOF10: 0

## 2017-07-25 NOTE — OP REPORT
Operative Report    Date: 7/25/2017    Surgeon: David Cason    Assistant: None    Anesthesiologist: None    Anesthesia: Moderate conscious sedation limited to a few doses of  Versed since the patient's potassium was high at 5.9. 20 mL 1% Xylocaine      Pre-operative Diagnosis: Stage V kidney disease with left radial cephalic AV fistula dysfunction with multiple stenoses. Hyperkalemia    Post-operative Diagnosis: Same.    Procedure: Left forearm distal radial cephalic arteriovenous fistula grams. An angioplasty distal AV fistula with 6 mm and 7 mm [place] balloons and 7 mm conquest balloon. Forearm balloon angioplasties with 7 mm [place] and a 7 mm conquest balloons. Imaging of venous outflow through arm region    Indications:   Poor fistula function with reduce flow volume and reduced quality of dialysis    Findings: At least 3 very severe stenoses right up to the arterial venous anastomosis at the wrist. Midforearm had several severe stenoses one appearing almost inclusive. Basilic and cephalic outflow looks well developed.    Procedure in detail:  After obtaining informed consent he was positioned in the supine orientation on the procedure table. A timeout was performed. The left upper extremity was prepped with ChloraPrep and draped sterilely. I examined the forearm with ultrasound. Previous duplex imaging's identified the stenosis to be distal. Also another stenosis midforearm. With this in mind I chose to enter the antecubital vein directed toward the wrist. This was done under local anesthesia with a micropuncture needle wire and dilator. Prior to doing this I injected local anesthesia along both sides of the AV fistula from the wrist to the mid proximal forearm. We were not going to give him intravenous pain medication with the high potassium and we are able to completely eliminate the pain of the procedure with the local injection. I placed a 6 [place] sheath and over Glidewire passed a glide catheter to  the anastomosis at the wrist. Imaging demonstrated several severe near occlusive structures at this level. Also the midforearm had severe stenosis. Over the Glidewire passed initially a 6 mm [place] balloon and inflated it several times across his stenoses distally. There was still residual narrowing so I used a 7 mm [place]. This improved. But there was left a moderate generalized narrowing. After dilating through the midforearm and improving the outflow by this means I return to the wrist level and elected to use a conquest balloon. This was a 7 x 40 and it did not appear to be any different in diameter than they've [place]. I inflated this until the balloon achieved full diameter. After taking this down I found that there was a small bubble which represented a small pseudoaneurysm. I inflated this 7 mm conquest a few areas in the midforearm and then took another fistulogram through  the sheath demonstrating the outflow. I did not go back and reimage the wrist since I recognized that I created a small pseudoaneurysm and I was not going to do anymore intervention.     EBL: Less than 20 mL    Dispo: Home after observation for one hour PPU.    Instructions I recommended he wear a 2 inch Ace bandage around the wrist where the pseudoaneurysm was evident. It did not take much pressure to hold it fairly even to the skin surface and I asked him to wear this for the rest of the afternoon.  This small pseudoaneurysm represents a complication of the procedure. The fistula however is functioning significantly better with high flow rate at this time.

## 2017-07-25 NOTE — IP AVS SNAPSHOT
" Home Care Instructions                                                                                                                Name:Parmjit Castelan  Medical Record Number:[id number]  CSN: [id number]    YOB: 1940   Age: 76 y.o.  Sex: male  HT:1.74 m (5' 8.5\") WT: 68.947 kg (152 lb)          Admit Date: 7/25/2017     Discharge Date:   Today's Date: 7/25/2017  Attending Doctor:  David Cason M.D.                  Allergies:  Review of patient's allergies indicates no known allergies.                Discharge Instructions         ACTIVITY: Rest and take it easy for the first 24 hours.  A responsible adult is recommended to remain with you during that time.  It is normal to feel sleepy.  We encourage you to not do anything that requires balance, judgment or coordination.    MILD FLU-LIKE SYMPTOMS ARE NORMAL. YOU MAY EXPERIENCE GENERALIZED MUSCLE ACHES, THROAT IRRITATION, HEADACHE AND/OR SOME NAUSEA.    FOR 24 HOURS DO NOT:  Drive, operate machinery or run household appliances.  Drink beer or alcoholic beverages.   Make important decisions or sign legal documents.    SPECIAL INSTRUCTIONS: *KEEP INCISION DRY FOR 24 HRS.  KEEP ACE WRAP ON UNTIL 6PM TODAY.  IF ACE WRAP FELT TIGHT THEN MAY LOOSEN IT UP. THEN MAY REMOVE ACE WRAP,  LEAVE CLEAR PLASTIC DRESSING UNDERNEATH ON UNTIL CHANGED BY DIALYSIS RN TOMORROW**    DIET: To avoid nausea, slowly advance diet as tolerated, avoiding spicy or greasy foods for the first day.  Add more substantial food to your diet according to your physician's instructions.  Babies can be fed formula or breast milk as soon as they are hungry.  INCREASE FLUIDS AND FIBER TO AVOID CONSTIPATION.    SURGICAL DRESSING/BATHING: *MAY SHOWER TOMORROW AFTER DIALYSIS AND FOLLOW INSTRUCTION FROM DIALYSIS RN.**    FOLLOW-UP APPOINTMENT:  A follow-up appointment should be arranged with your doctor in *[facility] [place]  [phone] **; call to schedule.    You should CALL YOUR PHYSICIAN if you " develop:  Fever greater than 101 degrees F.  Pain not relieved by medication, or persistent nausea or vomiting.  Excessive bleeding (blood soaking through dressing) or unexpected drainage from the wound.  Extreme redness or swelling around the incision site, drainage of pus or foul smelling drainage.  Inability to urinate or empty your bladder within 8 hours.  Problems with breathing or chest pain.    You should call 911 if you develop problems with breathing or chest pain.  If you are unable to contact your doctor or surgical center, you should go to the nearest emergency room or urgent care center.  Physician's telephone #: *[phone]**    If any questions arise, call your doctor.  If your doctor is not available, please feel free to call the Surgical Center at [phone].  The Center is open Monday through Friday from 7AM to 7PM.  You can also call the [employer] HOTLINE open 24 hours/day, 7 days/week and speak to a nurse at [phone], or toll free at [phone].    A registered nurse may call you a few days after your surgery to see how you are doing after your procedure.    MEDICATIONS: Resume taking daily medication.  Take prescribed pain medication with food.  If no medication is prescribed, you may take non-aspirin pain medication if needed.  PAIN MEDICATION CAN BE VERY CONSTIPATING.  Take a stool softener or laxative such as senokot, pericolace, or milk of magnesia if needed.      If your physician has prescribed pain medication that includes Acetaminophen (Tylenol), do not take additional Acetaminophen (Tylenol) while taking the prescribed medication.    Depression / Suicide Risk    As you are discharged from this [facility] facility, it is important to learn how to keep safe from harming yourself.    Recognize the warning signs:  · Abrupt changes in personality, positive or negative- including increase in energy   · Giving away possessions  · Change in eating patterns- significant weight  changes-  positive or negative  · Change in sleeping patterns- unable to sleep or sleeping all the time   · Unwillingness or inability to communicate  · Depression  · Unusual sadness, discouragement and loneliness  · Talk of wanting to die  · Neglect of personal appearance   · Rebelliousness- reckless behavior  · Withdrawal from people/activities they love  · Confusion- inability to concentrate     If you or a loved one observes any of these behaviors or has concerns about self-harm, here's what you can do:  · Talk about it- your feelings and reasons for harming yourself  · Remove any means that you might use to hurt yourself (examples: pills, rope, extension cords, firearm)  · Get professional help from the community (Mental Health, Substance Abuse, psychological counseling)  · Do not be alone:Call your Safe Contact- someone whom you trust who will be there for you.  · Call your local CRISIS HOTLINE 764-8628 or 562-056-3979  · Call your local Children's Mobile Crisis Response Team [place] (273) 910-4418 or www.[employer]  · Call the toll free National Suicide Prevention Hotlines   · National Suicide Prevention Lifeline 288-048-RKWU (4877)  · National Hope Line Network 800-SUICIDE (494-9260)       Medication List      ASK your doctor about these medications        Instructions    Morning Afternoon Evening Bedtime    amlodipine 10 MG Tabs   Commonly known as:  NORVASC        TAKE ONE TABLET BY MOUTH DAILY                        budesonide-formoterol 160-4.5 MCG/ACT Aero   Commonly known as:  SYMBICORT        Inhale 2 Puffs by mouth 2 Times a Day. Inhale 2 puffs by mouth twice daily. Rinse mouth after each use.   Dose:  2 Puff                        Calcium Acetate (Phos Binder) 667 MG Caps        TAKE TWO CAPSULES BY MOUTH THREE TIMES A DAY WITH A MEAL                        DIALYVITE TABLET Tabs        TAKE ONE TABLET BY MOUTH DAILY                        DOXAZOSIN MESYLATE PO        Take  by mouth.                         epoetin hilario 2000 UNIT/ML Soln   Commonly known as:  EPOGEN,PROCRIT        Inject 1 mL as instructed every Monday, Wednesday, and Friday.   Dose:  2000 Units                        fluticasone 50 MCG/ACT nasal spray   Commonly known as:  FLONASE        PLACE ONE TO TWO SPRAYS IN EACH NOSTRIL EVERY DAY                        furosemide 80 MG Tabs   Commonly known as:  LASIX        Take 1 Tab by mouth 2 times a day.   Dose:  80 mg                        labetalol 300 MG Tabs   Commonly known as:  NORMODYNE        Take 2 Tabs by mouth 2 times a day.   Dose:  600 mg                        lisinopril 40 MG tablet   Commonly known as:  PRINIVIL, ZESTRIL        Take 1 Tab by mouth every day.   Dose:  40 mg                        omeprazole 40 MG delayed-release capsule   Commonly known as:  PRILOSEC        TAKE ONE CAPSULE BY MOUTH DAILY (GENERIC FOR PRILOSEC)                        ondansetron 4 MG Tbdp   Commonly known as:  ZOFRAN ODT        DISSOLVE 1 TABLET BY MOUTH EVERY 6 HOURS AS NEEDED FOR NAUSEA                        oxycodone-acetaminophen 5-325 MG Tabs   Commonly known as:  PERCOCET        Take 1-2 Tabs by mouth every 6 hours as needed for Severe Pain.   Dose:  1-2 Tab                        pravastatin 20 MG Tabs   Commonly known as:  PRAVACHOL        TAKE ONE TABLET BY MOUTH DAILY                        spironolactone 25 MG Tabs   Commonly known as:  ALDACTONE        Take 1 Tab by mouth every day.   Dose:  25 mg                        tamsulosin 0.4 MG capsule   Commonly known as:  FLOMAX        TAKE TWO CAPSULES BY MOUTH DAILY 1/2 HOUR AFTER BREAKFAST                        tizanidine 2 MG tablet   Commonly known as:  ZANAFLEX        TAKE ONE TO TWO TABLETS BY MOUTH EVERY NIGHT AT BEDTIME FOR NECK MUSCLE SPASM. (ZANAFLEX)                        trazodone 150 MG Tabs   Commonly known as:  DESYREL        TAKE TWO TABLETS BY MOUTH EVERY NIGHT AT BEDTIME (GENERIC FOR DESYREL)                                 Medication Information     Above and/or attached are the medications your physician expects you to take upon discharge. Review all of your home medications and newly ordered medications with your doctor and/or pharmacist. Follow medication instructions as directed by your doctor and/or pharmacist. Please keep your medication list with you and share with your physician. Update the information when medications are discontinued, doses are changed, or new medications (including over-the-counter products) are added; and carry medication information at all times in the event of emergency situations.        Resources     Quit Smoking / Tobacco Use:    I understand the use of any tobacco products increases my chance of suffering from future heart disease or stroke and could cause other illnesses which may shorten my life. Quitting the use of tobacco products is the single most important thing I can do to improve my health. For further information on smoking / tobacco cessation call a Toll Free Quit Line at 1-887.858.3977 (*National Cancer [place]) or 1-133.423.5074 (American Lung Association) or you can access the web based program at www.lungComprimato.org.    [place] Tobacco Users Help Line:  (674) 151-5724       Toll Free: 1-313.849.8296  Quit Tobacco Program [facility] (583)712-9058    Crisis Hotline:    [place] Crisis Hotline:  3-347-IYYHXMT or 1-381.961.1840    [place] Crisis Hotline:    1-704.217.4795 or 435-426-8225    Discharge Survey:   Thank you for choosing [facility]. We hope we did everything we could to make your hospital stay a pleasant one. You may be receiving a survey and we would appreciate your time and participation in answering the questions. Your input is very valuable to us in our efforts to improve our service to our patients and their families.            Signatures     My signature on this form indicates that:    1. I acknowledge receipt and understanding of  these Home Care Instruction.  2. My questions regarding this information have been answered to my satisfaction.  3. I have formulated a plan with my discharge nurse to obtain my prescribed medications for home.    __________________________________      __________________________________                   Patient Signature                                 Guardian/Responsible Adult Signature      __________________________________                 __________       ________                       Nurse Signature                                               Date                 Time

## 2017-07-25 NOTE — OR NURSING
1252   PATIENT RECEIVED FROM IR,  S/P LEFT ARM FISTULOGRAM.  SITE WITH PRESSURE DRESSING INTACT,  NOT VERY TIGHT.  SLIGHT SWELLING BUT NO BLEEDING.  ARM IS ELEVATED AND WRAP IN WARM BLANKET.  WIFE IS AT BEDSIDE.    1330   PATIENT INSISTED TO SIT UP IN THE CHAIR.  LEFT ARM IN WARM BLANKET.  PATIENT TAKING PO FLUID AND SNACK WITHOUT DIFFICULTY.    1430   FISTULOGRAM SITE IS SOFT,  NO BLEEDING.  DC INSTRUCTIONS GIVEN TO PATIENT AND WIFE.  VERBALIZED UNDERSTANDING OF ALL.  HL DC.  PATIENT DC TO HOME,  VIA WHEELCHAIR, ESCORTED OUT BY CNA.

## 2017-07-25 NOTE — PROGRESS NOTES
IR Procedure Note:    Dr. Cason performed a left arm fistulogram with angioplasty.  The pt tolerated the procedure well, vital signs stable.  Gauze and tegaderm applied to left forearm, CDI and soft.  Report given to Jose BYRD.  Pt and wife transported back to PPU.

## 2017-07-25 NOTE — IP AVS SNAPSHOT
7/25/2017    Parmjit Castelan  [address] [place] [address]    Dear Parmjit:    [facility] wants to ensure your discharge home is safe and you or your loved ones have had all of your questions answered regarding your care after you leave the hospital.    Below is a list of resources and contact information should you have any questions regarding your hospital stay, follow-up instructions, or active medical symptoms.    Questions or Concerns Regarding… Contact   Medical Questions Related to Your Discharge  (7 days a week, 8am-5pm) Contact a Nurse Care Coordinator   [phone]   Medical Questions Not Related to Your Discharge  (24 hours a day / 7 days a week)  Contact the Nurse Health Line   [phone]    Medications or Discharge Instructions Refer to your discharge packet   or contact your [facility] Primary Care Provider   [phone]   Follow-up Appointment(s) Schedule your appointment via CloudCheckr   or contact Scheduling [phone]   Billing Review your statement via CloudCheckr  or contact Billing [phone]   Medical Records Review your records via CloudCheckr   or contact Medical Records [phone]     You may receive a telephone call within two days of discharge. This call is to make certain you understand your discharge instructions and have the opportunity to have any questions answered. You can also easily access your medical information, test results and upcoming appointments via the CloudCheckr free online health management tool. You can learn more and sign up at [employer]/CloudCheckr. For assistance setting up your CloudCheckr account, please call [phone].    Once again, we want to ensure your discharge home is safe and that you have a clear understanding of any next steps in your care. If you have any questions or concerns, please do not hesitate to contact us, we are here for you. Thank you for choosing [facility] for your healthcare needs.    Sincerely,    Your [facility] Healthcare Team

## 2017-07-25 NOTE — DISCHARGE INSTRUCTIONS
ACTIVITY: Rest and take it easy for the first 24 hours.  A responsible adult is recommended to remain with you during that time.  It is normal to feel sleepy.  We encourage you to not do anything that requires balance, judgment or coordination.    MILD FLU-LIKE SYMPTOMS ARE NORMAL. YOU MAY EXPERIENCE GENERALIZED MUSCLE ACHES, THROAT IRRITATION, HEADACHE AND/OR SOME NAUSEA.    FOR 24 HOURS DO NOT:  Drive, operate machinery or run household appliances.  Drink beer or alcoholic beverages.   Make important decisions or sign legal documents.    SPECIAL INSTRUCTIONS: *KEEP INCISION DRY FOR 24 HRS.  KEEP ACE WRAP ON UNTIL 6PM TODAY.  IF ACE WRAP FELT TIGHT THEN MAY LOOSEN IT UP. THEN MAY REMOVE ACE WRAP,  LEAVE CLEAR PLASTIC DRESSING UNDERNEATH ON UNTIL CHANGED BY DIALYSIS RN TOMORROW.  NO PUSHING OR PULLING WITH LEFT ARM FOR 2 DAYS.  IF SWELLING GET WORSE OR BLEEDING OCCURS,  PATIENT TO CHECK INTO ER**    DIET: To avoid nausea, slowly advance diet as tolerated, avoiding spicy or greasy foods for the first day.  Add more substantial food to your diet according to your physician's instructions.  Babies can be fed formula or breast milk as soon as they are hungry.  INCREASE FLUIDS AND FIBER TO AVOID CONSTIPATION.    SURGICAL DRESSING/BATHING: *MAY SHOWER TOMORROW AFTER DIALYSIS AND FOLLOW INSTRUCTION FROM DIALYSIS RN.**    FOLLOW-UP APPOINTMENT:  A follow-up appointment should be arranged with your doctor in *DR MOSCOSO  [phone] **; call to schedule.    You should CALL YOUR PHYSICIAN if you develop:  Fever greater than 101 degrees F.  Pain not relieved by medication, or persistent nausea or vomiting.  Excessive bleeding (blood soaking through dressing) or unexpected drainage from the wound.  Extreme redness or swelling around the incision site, drainage of pus or foul smelling drainage.  Inability to urinate or empty your bladder within 8 hours.  Problems with breathing or chest pain.    You should call 911 if you develop  problems with breathing or chest pain.  If you are unable to contact your doctor or surgical center, you should go to the nearest emergency room or urgent care center.  Physician's telephone #: *[phone]**    If any questions arise, call your doctor.  If your doctor is not available, please feel free to call the Surgical Center at [phone].  The Center is open Monday through Friday from 7AM to 7PM.  You can also call the HEALTH HOTLINE open 24 hours/day, 7 days/week and speak to a nurse at [phone], or toll free at [phone].    A registered nurse may call you a few days after your surgery to see how you are doing after your procedure.    MEDICATIONS: Resume taking daily medication.  Take prescribed pain medication with food.  If no medication is prescribed, you may take non-aspirin pain medication if needed.  PAIN MEDICATION CAN BE VERY CONSTIPATING.  Take a stool softener or laxative such as senokot, pericolace, or milk of magnesia if needed.      If your physician has prescribed pain medication that includes Acetaminophen (Tylenol), do not take additional Acetaminophen (Tylenol) while taking the prescribed medication.    Depression / Suicide Risk    As you are discharged from this [facility] facility, it is important to learn how to keep safe from harming yourself.    Recognize the warning signs:  · Abrupt changes in personality, positive or negative- including increase in energy   · Giving away possessions  · Change in eating patterns- significant weight changes-  positive or negative  · Change in sleeping patterns- unable to sleep or sleeping all the time   · Unwillingness or inability to communicate  · Depression  · Unusual sadness, discouragement and loneliness  · Talk of wanting to die  · Neglect of personal appearance   · Rebelliousness- reckless behavior  · Withdrawal from people/activities they love  · Confusion- inability to concentrate     If you or a loved one observes any of these  behaviors or has concerns about self-harm, here's what you can do:  · Talk about it- your feelings and reasons for harming yourself  · Remove any means that you might use to hurt yourself (examples: pills, rope, extension cords, firearm)  · Get professional help from the community (Mental Health, Substance Abuse, psychological counseling)  · Do not be alone:Call your Safe Contact- someone whom you trust who will be there for you.  · Call your local CRISIS HOTLINE 016-7785 or 684-983-2424  · Call your local Children's Mobile Crisis Response Team [place] (196) 122-5674 or www.Mobibase  · Call the toll free National Suicide Prevention Hotlines   · National Suicide Prevention Lifeline 762-949-YEZU (4628)  · National Hope Line Network 800-SUICIDE (756-8182)

## 2017-08-10 ENCOUNTER — OFFICE VISIT (OUTPATIENT)
Dept: URGENT CARE | Facility: CLINIC | Age: 77
End: 2017-08-10
Payer: MEDICARE

## 2017-08-10 ENCOUNTER — APPOINTMENT (OUTPATIENT)
Dept: RADIOLOGY | Facility: IMAGING CENTER | Age: 77
End: 2017-08-10
Attending: PHYSICIAN ASSISTANT
Payer: MEDICARE

## 2017-08-10 VITALS
HEIGHT: 69 IN | DIASTOLIC BLOOD PRESSURE: 70 MMHG | HEART RATE: 68 BPM | SYSTOLIC BLOOD PRESSURE: 140 MMHG | RESPIRATION RATE: 20 BRPM | WEIGHT: 153 LBS | TEMPERATURE: 97.6 F | OXYGEN SATURATION: 94 % | BODY MASS INDEX: 22.66 KG/M2

## 2017-08-10 DIAGNOSIS — S69.91XA INJURY OF RIGHT HAND, INITIAL ENCOUNTER: ICD-10-CM

## 2017-08-10 DIAGNOSIS — S62.642A CLOSED NONDISPLACED FRACTURE OF PROXIMAL PHALANX OF RIGHT MIDDLE FINGER, INITIAL ENCOUNTER: ICD-10-CM

## 2017-08-10 DIAGNOSIS — S62.302B: ICD-10-CM

## 2017-08-10 PROCEDURE — 73140 X-RAY EXAM OF FINGER(S): CPT | Mod: TC,RT | Performed by: PHYSICIAN ASSISTANT

## 2017-08-10 PROCEDURE — 99214 OFFICE O/P EST MOD 30 MIN: CPT | Performed by: PHYSICIAN ASSISTANT

## 2017-08-10 ASSESSMENT — ENCOUNTER SYMPTOMS
NUMBNESS: 1
FOCAL WEAKNESS: 0
ARTHRALGIAS: 1
TINGLING: 0
WEAKNESS: 0
SENSORY CHANGE: 1

## 2017-08-10 NOTE — MR AVS SNAPSHOT
"Parmjit Castelan   8/10/2017 5:15 PM   Office Visit   MRN: [id number]    Department:  [facility]   Dept Phone:  [phone]    Description:  Male : 1940   Provider:  Aurora Walker PA-C           Reason for Visit     Hand Injury Trip on dog today hurt right hand , swollen and pain      Allergies as of 8/10/2017     No Known Allergies      You were diagnosed with     Open nondisplaced fracture of third metacarpal bone of right hand, unspecified portion of metacarpal, initial encounter   [3152297]       Closed nondisplaced fracture of proximal phalanx of right middle finger, initial encounter   [959798]         Vital Signs     Blood Pressure Pulse Temperature Respirations Height Weight    140/70 mmHg 68 36.4 °C (97.6 °F) 20 1.74 m (5' 8.5\") 69.4 kg (153 lb)    Body Mass Index Oxygen Saturation Smoking Status             22.92 kg/m2 94% Former Smoker         Basic Information     Date Of Birth Sex Race Ethnicity Preferred Language    1940 Male White Non- English      Your appointments     Oct 03, 2017  1:00 PM   Follow Up Visit with Shauna Lorenzo M.D.   [facility] (--)    [address], [address]  [place] [address] [phone]           You will be receiving a confirmation call a few days before your appointment from our automated call confirmation system.            2018  4:30 PM   FOLLOW UP with James Mendoza M.D.   [facility] (--)    [address]  [place] [address] [phone]              Problem List              ICD-10-CM Priority Class Noted - Resolved    BPH (benign prostatic hypertrophy) N40.0 Low  2009 - Present    Essential hypertension I10 High  1/15/2010 - Present    Hard of hearing H91.90   2011 - Present    Preventative health care Z00.00   2011 - Present    HELGA (obstructive sleep apnea) G47.33   2011 - Present    COPD (chronic obstructive pulmonary " disease) (CMS-HCC) J44.9   8/2/2011 - Present    BPH (benign prostatic hyperplasia) N40.0   1/13/2012 - Present    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   12/14/2012 - Present    Kidney damage S37.009A   1/29/2013 - Present    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   7/2/2013 - Present    Elevated coronary artery calcium score R93.1 High  2/20/2014 - Present    End stage renal disease (HCC) N18.6   3/24/2014 - Present    Dyslipidemia E78.5 Low  12/5/2014 - Present    Pigmented skin lesion L81.9   3/18/2015 - Present    Basal cell carcinoma of left cheek C44.319   3/26/2015 - Present    Anemia in CKD (chronic kidney disease) N18.9, D63.1   5/29/2015 - Present    Leg pain, bilateral M79.604, M79.605   8/10/2015 - Present    Primary osteoarthritis of both hands M19.041, M19.042   8/20/2015 - Present    Chronic gout M1A.9XX0   1/7/2016 - Present    Respiratory failure (CMS-HCC) J96.90   5/8/2016 - Present    Hyperkalemia, diminished renal excretion E87.5   5/8/2016 - Present    Pneumonia J18.9   5/8/2016 - Present    Pulmonary edema J81.1   5/8/2016 - Present    Anemia D64.9   5/8/2016 - Present    Leukocytosis D72.829   5/8/2016 - Present    COPD exacerbation (CMS-HCC) J44.1   5/8/2016 - Present    ESRD (end stage renal disease) on dialysis (CMS-HCC) N18.6, Z99.2   5/8/2016 - Present    Fluid overload E87.70   5/8/2016 - Present    Systolic CHF, acute (CMS-HCC) I50.21   5/11/2016 - Present    HTN (hypertension), malignant I10   5/11/2016 - Present    Bacteremia due to Staphylococcus aureus R78.81   5/11/2016 - Present    Hyperkalemia E87.5   5/11/2016 - Present    Renal cyst N28.1   5/11/2016 - Present    ESRD (end stage renal disease) (CMS-HCC) N18.6   8/12/2016 - Present    Hypotension I95.9 Medium  8/13/2016 - Present    Need for prophylactic vaccination with combined vaccine Z23   9/8/2016 - Present    AVM (arteriovenous malformation) Q27.30   9/8/2016 - Present    Dyspnea on exertion R06.09    3/16/2017 - Present    Nocturnal hypoxemia G47.34   3/21/2017 - Present    Pulmonary hypertension (CMS-HCC) I27.2   3/21/2017 - Present    Pedal edema R60.0   3/21/2017 - Present    Acute neck pain M54.2   4/15/2017 - Present    Bacteremia due to Gram-positive bacteria A49.9   4/16/2017 - Present    Pericardial effusion I31.3 Medium  4/20/2017 - Present    Aortic valve sclerosis I35.8 Medium  4/20/2017 - Present    Cervical discitis M46.42   4/28/2017 - Present      Health Maintenance        Date Due Completion Dates    IMM DTaP/Tdap/Td Vaccine (1 - Tdap) 7/30/1961 ---    IMM PNEUMOCOCCAL 65+ (ADULT) HIGH/HIGHEST RISK SERIES (2 of 2 - PCV13) 9/8/2017 9/8/2016    IMM INFLUENZA (1) 9/1/2017 10/1/2013    COLONOSCOPY 8/15/2026 8/15/2016, 9/16/2013            Current Immunizations     Influenza TIV (IM) 10/1/2013    Pneumococcal polysaccharide vaccine (PPSV-23) 9/8/2016    SHINGLES VACCINE 10/11/2015      Below and/or attached are the medications your provider expects you to take. Review all of your home medications and newly ordered medications with your provider and/or pharmacist. Follow medication instructions as directed by your provider and/or pharmacist. Please keep your medication list with you and share with your provider. Update the information when medications are discontinued, doses are changed, or new medications (including over-the-counter products) are added; and carry medication information at all times in the event of emergency situations     Allergies:  No Known Allergies          Medications  Valid as of: August 10, 2017 -  7:24 PM    Generic Name Brand Name Tablet Size Instructions for use    AmLODIPine Besylate (Tab) NORVASC 10 MG TAKE ONE TABLET BY MOUTH DAILY        B Complex-C-Folic Acid (Tab) DIALYVITE TABLET  TAKE ONE TABLET BY MOUTH DAILY        Budesonide-Formoterol Fumarate (Aerosol) SYMBICORT 160-4.5 MCG/ACT Inhale 2 Puffs by mouth 2 Times a Day. Inhale 2 puffs by mouth twice daily. Rinse  mouth after each use.        Calcium Acetate (Phos Binder) (Cap) Calcium Acetate (Phos Binder) 667 MG TAKE TWO CAPSULES BY MOUTH THREE TIMES A DAY WITH A MEAL        Doxazosin Mesylate   Take  by mouth.        Epoetin Mitchel (Solution) EPOGEN,PROCRIT 2000 UNIT/ML Inject 1 mL as instructed every Monday, Wednesday, and Friday.        Fluticasone Propionate (Suspension) FLONASE 50 MCG/ACT PLACE ONE TO TWO SPRAYS IN EACH NOSTRIL EVERY DAY        Furosemide (Tab) LASIX 80 MG Take 1 Tab by mouth 2 times a day.        Labetalol HCl (Tab) NORMODYNE 300 MG Take 2 Tabs by mouth 2 times a day.        Lisinopril (Tab) PRINIVIL, ZESTRIL 40 MG Take 1 Tab by mouth every day.        Omeprazole (CAPSULE DELAYED RELEASE) PRILOSEC 40 MG TAKE ONE CAPSULE BY MOUTH DAILY (GENERIC FOR PRILOSEC)        Ondansetron (TABLET DISPERSIBLE) ZOFRAN ODT 4 MG DISSOLVE 1 TABLET BY MOUTH EVERY 6 HOURS AS NEEDED FOR NAUSEA        Oxycodone-Acetaminophen (Tab) PERCOCET 5-325 MG Take 1-2 Tabs by mouth every 6 hours as needed for Severe Pain.        Pravastatin Sodium (Tab) PRAVACHOL 20 MG TAKE ONE TABLET BY MOUTH DAILY        Spironolactone (Tab) ALDACTONE 25 MG Take 1 Tab by mouth every day.        Tamsulosin HCl (Cap) FLOMAX 0.4 MG TAKE TWO CAPSULES BY MOUTH DAILY 1/2 HOUR AFTER BREAKFAST        TiZANidine HCl (Tab) ZANAFLEX 2 MG TAKE ONE TO TWO TABLETS BY MOUTH EVERY NIGHT AT BEDTIME FOR NECK MUSCLE SPASM. (ZANAFLEX)        TraZODone HCl (Tab) DESYREL 150 MG TAKE TWO TABLETS BY MOUTH EVERY NIGHT AT BEDTIME (GENERIC FOR DESYREL)        .                 Medicines prescribed today were sent to:     [facility][id number] - [place], [place] - [address]    [address] [address]    Phone: [phone] Fax: [phone]    Open 24 Hours?: No      Medication refill instructions:       If your prescription bottle indicates you have medication refills left, it is not necessary to call your provider’s office. Please contact your  pharmacy and they will refill your medication.    If your prescription bottle indicates you do not have any refills left, you may request refills at any time through one of the following ways: The online [employer] system (except Urgent Care), by calling your provider’s office, or by asking your pharmacy to contact your provider’s office with a refill request. Medication refills are processed only during regular business hours and may not be available until the next business day. Your provider may request additional information or to have a follow-up visit with you prior to refilling your medication.   *Please Note: Medication refills are assigned a new Rx number when refilled electronically. Your pharmacy may indicate that no refills were authorized even though a new prescription for the same medication is available at the pharmacy. Please request the medicine by name with the pharmacy before contacting your provider for a refill.        Your To Do List     Future Labs/Procedures Complete By Expires    DX-FINGER(S) 2+ RIGHT  As directed 8/10/2018      Referral     A referral request has been sent to our patient care coordination department. Please allow 3-5 business days for us to process this request and contact you either by phone or mail. If you do not hear from us by the 5th business day, please call us at [phone].           Social Yuppies Status: Patient Declined

## 2017-08-11 NOTE — PROGRESS NOTES
"Subjective:      Parmjit Castelan is a 77 y.o. male who presents with Hand Injury            Hand Injury  This is a new problem. The current episode started today (patient reports falling after tripping over his small dog today. he landed on his right hand. He reports right hand pain and right middle finger pain ). The problem occurs constantly. The problem has been unchanged. Associated symptoms include arthralgias (right hand pain and right middle finger pain ) and numbness (mild numbness in right finger). Pertinent negatives include no rash or weakness. Exacerbated by: movement of right hand and right middle finger. He has tried ice for the symptoms. The treatment provided mild relief.       Past Medical History   Diagnosis Date   • BPH 7/14/2009   • HTN (hypertension) 1/15/2010   • Snoring    • COPD (chronic obstructive pulmonary disease) (CMS-HCC)    • Proteinuria    • COPD (chronic obstructive pulmonary disease) (CMS-HCC) 8/2/2011   • EMPHYSEMA    • Detached retina    • CKD (chronic kidney disease) stage 4, GFR 15-29 ml/min (CMS-HCC) 1/15/2010     end stage renal disease   • Kidney cyst    • On supplemental oxygen therapy    • Heart burn      occas   • Indigestion      occas   • Bronchitis 1/1/13   • CAD (coronary artery disease) 2/20/2014   • Sleep apnea      O2 PER CANULA  AT NIGHT 2l/m   • Dialysis      m,w,f juliann   • CATARACT      sanjuanita surgery complete   • Basal cell carcinoma of left cheek 3/26/2015   • Leg pain, bilateral 8/10/2015   • Gout    • Pneumonia    • Anesthesia      \"needs more sedation\" (can sometimes hear MD during procedure)    • High cholesterol      Past Surgical History   Procedure Laterality Date   • Cataract phaco with iol  4/8/08     Performed by STACEY PHILLIPS at SURGERY SAME DAY [facility] ORS   • Av fistula creation  2/12/2010     Performed by ALESHIA MOSCOSO at SURGERY [facility] ORS   • Av fistula revision  2/19/2010     Performed by WILLIE HATCH at SURGERY [facility] " ORS   • Av fistulogram  7/23/2010     Performed by DAVID CASON at SURGERY [facility] ORS   • Angioplasty balloon  7/23/2010     Performed by DAVID CASON at SURGERY [facility] ORS   • Av fistulogram  9/17/2010     Performed by DAVID CASON at SURGERY [facility] ORS   • Angioplasty balloon  9/17/2010     Performed by DAVID CASON at SURGERY [facility] ORS   • Vitrectomy posterior  1/18/2011     Performed by NAHUM GE at SURGERY SAME DAY [facility] ORS   • Scleral buckling  1/18/2011     Performed by NAUHM GE at SURGERY SAME DAY [facility] ORS   • Recovery  8/12/2011     Performed by SURGERY, IR-RECOVERY at SURGERY SAME DAY [facility] ORS   • Vitrectomy posterior  10/11/2011     Performed by NAHUM GE at SURGERY SAME DAY [facility] ORS   • Recovery  7/23/2012     Performed by SURGERY, IR-RECOVERY at SURGERY SAME DAY [facility] ORS   • Recovery  1/29/2013     Performed by Ir-Recovery Surgery at SURGERY SAME DAY [facility] ORS   • Recovery  7/2/2013     Performed by Ir-Recovery Surgery at SURGERY SAME DAY [facility] ORS   • Av fistula thrombolysis  7/2/2013     Performed by David Cason M.D. at SURGERY [facility] ORS   • Recovery  12/17/2013     Performed by Ir-Recovery Surgery at SURGERY SAME DAY [facility] ORS   • Recovery  3/24/2014     Performed by Ir-Recovery Surgery at SURGERY [facility] ORS   • Recovery  7/29/2014     Performed by Ir-Recovery Surgery at SURGERY SAME DAY [facility] ORS   • Recovery  3/24/2015     Performed by Recoveryonly Surgery at SURGERY PRE-POST PROC UNIT [facility]   • Recovery  12/23/2015     Procedure: VASCULAR CASE-BLANKA-LEFT ARM FISTULOGRAM WITH ANGIOPLASTY;  Surgeon: Recoveryonbhavani Surgery;  Location: SURGERY PRE-POST PROC UNIT [facility];  Service:    • Gastroscopy with biopsy  8/13/2016     Procedure: GASTROSCOPY WITH BIOPSY;  Surgeon: Jorge Leavitt M.D.;  Location: ENDOSCOPY [facility];  Service:    • Colonoscopy - endo  8/15/2016     Procedure:  "COLONOSCOPY - ENDO;  Surgeon: Mane Whatley M.D.;  Location: ENDOSCOPY [facility];  Service:        Family History   Problem Relation Age of Onset   • Lung Disease Father      Emphysema, resp failure   • Genitourinary () Maternal Aunt      hematuria   • Stroke Mother    • Hypertension Mother    • Hypertension Brother        No Known Allergies    Medications, Allergies, and current problem list reviewed today in Epic      Review of Systems   Musculoskeletal: Positive for joint pain and arthralgias (right hand pain and right middle finger pain ).   Skin: Negative for itching and rash.        Bruising of right hand and right middle finger   Neurological: Positive for sensory change and numbness (mild numbness in right finger). Negative for tingling, focal weakness and weakness.     All other systems reviewed and are negative.        Objective:     /70 mmHg  Pulse 68  Temp(Src) 36.4 °C (97.6 °F)  Resp 20  Ht 1.74 m (5' 8.5\")  Wt 69.4 kg (153 lb)  BMI 22.92 kg/m2  SpO2 94%     Physical Exam   Constitutional: He is oriented to person, place, and time. He appears well-developed and well-nourished. No distress.   Eyes: Conjunctivae are normal.   Pulmonary/Chest: Effort normal. No respiratory distress.   Musculoskeletal:   Right hand: Diffuse of right middle finger and MCP of middle finger on dorsal aspect of hand. Limited ROM with flexion of middle finger due to pain. Diffuse ecchymosis noted. Distal n/v intact. Radial pulse 2+. Brisk capillary refill.   Neurological: He is alert and oriented to person, place, and time. No cranial nerve deficit.   Skin: Skin is warm and dry.   Psychiatric: He has a normal mood and affect. His behavior is normal. Judgment and thought content normal.     8/10/2017 5:58 PM    HISTORY/REASON FOR EXAM:  Pain in third digit after ground-level fall.      TECHNIQUE/EXAM DESCRIPTION AND NUMBER OF VIEWS:   3 views of the RIGHT fingers.    COMPARISON: " 06/22/2015    FINDINGS:  Bone mineralization is normal.  Cortical defect in the proximal dorsal aspect of the proximal phalanx of the third digit is identified. Small defect in the ulnar aspect of the distal end of the third metacarpal carpal bone is also noted. No other   fractures are noted. There is no evidence of dislocation.  Soft tissues are normal.         Impression        1.  Findings suggests fracture of the proximal dorsal aspect of the proximal phalanx of the third digit.    2.  Probable additional subtle fracture of the ulnar aspect of the distal third metacarpal bone.     X-ray reviewed by me. I agree with above.          Assessment/Plan:     1. Open nondisplaced fracture of third metacarpal bone of right hand, unspecified portion of metacarpal, initial encounter  DX-FINGER(S) 2+ RIGHT    REFERRAL TO ORTHOPEDICS       2. Closed nondisplaced fracture of proximal phalanx of right middle finger, initial encounter  REFERRAL TO ORTHOPEDICS     Patient placed in metacarpal splint.  We discussed X-rays.  Encouraged RICE. Stressed the importance of immobilization.  Referral to orthopedics placed.  Advised follow-up with Ortho. Patient given handout regarding address and number to [facility].     Differential diagnoses, Supportive care, and indications for immediate follow-up discussed with patient.   Instructed to return to clinic or nearest emergency department for any change in condition, further concerns, or worsening of symptoms.    The patient demonstrated a good understanding and agreed with the treatment plan.    Aurora Walker PA-C

## 2017-09-17 DIAGNOSIS — J45.909 UNCOMPLICATED ASTHMA, UNSPECIFIED ASTHMA SEVERITY: ICD-10-CM

## 2017-09-17 DIAGNOSIS — R09.82 POST-NASAL DRIP: ICD-10-CM

## 2017-09-18 RX ORDER — FLUTICASONE PROPIONATE 50 MCG
SPRAY, SUSPENSION (ML) NASAL
Qty: 1 BOTTLE | Refills: 5 | Status: ON HOLD | OUTPATIENT
Start: 2017-09-18 | End: 2018-03-20

## 2017-09-18 NOTE — TELEPHONE ENCOUNTER
Caller Name: Parmjit Castelan                 Call Back Number: [phone] (home)         Patient approves a detailed voicemail message: N\A    Have we ever prescribed this med? Yes.  If yes, what date? 9/7/16    Last OV: 3/21/17 ELIDA flores     Next OV: 6 MOS with PFT AND CNOX, NO PENDING APPT    DX: POST NASAL DRIP     Medications:  Current Outpatient Prescriptions   Medication Sig Dispense Refill   • Diclofenac Sodium 1 % Gel APPLY 2 GRAMS TO SORE JOINTS TWO TIMES A DAY AS DIRECTED, AS NEEDED 100 g 2   • DOXAZOSIN MESYLATE PO Take  by mouth.     • tizanidine (ZANAFLEX) 2 MG tablet TAKE ONE TO TWO TABLETS BY MOUTH EVERY NIGHT AT BEDTIME FOR NECK MUSCLE SPASM. (ZANAFLEX) 180 Tab 0   • spironolactone (ALDACTONE) 25 MG Tab Take 1 Tab by mouth every day. 30 Tab 11   • lisinopril (PRINIVIL, ZESTRIL) 40 MG tablet Take 1 Tab by mouth every day. 90 Tab 3   • furosemide (LASIX) 80 MG Tab Take 1 Tab by mouth 2 times a day. 180 Tab 3   • labetalol (NORMODYNE) 300 MG Tab Take 2 Tabs by mouth 2 times a day. 120 Tab 11   • pravastatin (PRAVACHOL) 20 MG Tab TAKE ONE TABLET BY MOUTH DAILY 90 Tab 3   • oxycodone-acetaminophen (PERCOCET) 5-325 MG Tab Take 1-2 Tabs by mouth every 6 hours as needed for Severe Pain. 20 Tab 0   • trazodone (DESYREL) 150 MG Tab TAKE TWO TABLETS BY MOUTH EVERY NIGHT AT BEDTIME (GENERIC FOR DESYREL) 180 Tab 4   • ondansetron (ZOFRAN ODT) 4 MG TABLET DISPERSIBLE DISSOLVE 1 TABLET BY MOUTH EVERY 6 HOURS AS NEEDED FOR NAUSEA 30 Tab 4   • B Complex-C-Folic Acid (DIALYVITE TABLET) Tab TAKE ONE TABLET BY MOUTH DAILY 90 Tab 4   • tamsulosin (FLOMAX) 0.4 MG capsule TAKE TWO CAPSULES BY MOUTH DAILY 1/2 HOUR AFTER BREAKFAST 180 Cap 11   • Calcium Acetate, Phos Binder, 667 MG Cap TAKE TWO CAPSULES BY MOUTH THREE TIMES A DAY WITH A MEAL 180 Cap 11   • fluticasone (FLONASE) 50 MCG/ACT nasal spray PLACE ONE TO TWO SPRAYS IN EACH NOSTRIL EVERY DAY 1 Bottle 5   • omeprazole (PRILOSEC) 40 MG delayed-release capsule TAKE  ONE CAPSULE BY MOUTH DAILY (GENERIC FOR PRILOSEC) 90 Cap 4   • epoetin hilario (EPOGEN,PROCRIT) 2000 UNIT/ML Solution Inject 1 mL as instructed every Monday, Wednesday, and Friday. 1 mL 0   • amlodipine (NORVASC) 10 MG Tab TAKE ONE TABLET BY MOUTH DAILY 90 Tab 4   • budesonide-formoterol (SYMBICORT) 160-4.5 MCG/ACT Aerosol Inhale 2 Puffs by mouth 2 Times a Day. Inhale 2 puffs by mouth twice daily. Rinse mouth after each use. 1 Inhaler 12     No current facility-administered medications for this visit.

## 2017-10-05 ENCOUNTER — OFFICE VISIT (OUTPATIENT)
Dept: URGENT CARE | Facility: CLINIC | Age: 77
End: 2017-10-05
Payer: MEDICARE

## 2017-10-05 VITALS
BODY MASS INDEX: 22.66 KG/M2 | HEART RATE: 78 BPM | SYSTOLIC BLOOD PRESSURE: 150 MMHG | DIASTOLIC BLOOD PRESSURE: 64 MMHG | WEIGHT: 153 LBS | HEIGHT: 69 IN | RESPIRATION RATE: 18 BRPM | TEMPERATURE: 98.9 F | OXYGEN SATURATION: 98 %

## 2017-10-05 DIAGNOSIS — J40 BRONCHITIS: ICD-10-CM

## 2017-10-05 PROCEDURE — 99214 OFFICE O/P EST MOD 30 MIN: CPT | Performed by: NURSE PRACTITIONER

## 2017-10-05 RX ORDER — ALBUTEROL SULFATE 2.5 MG/3ML
2.5 SOLUTION RESPIRATORY (INHALATION) EVERY 4 HOURS PRN
Qty: 30 BULLET | Refills: 2 | Status: ON HOLD | OUTPATIENT
Start: 2017-10-05 | End: 2018-03-20

## 2017-10-05 RX ORDER — DOXYCYCLINE HYCLATE 100 MG
100 TABLET ORAL 2 TIMES DAILY
Qty: 20 TAB | Refills: 0 | Status: SHIPPED | OUTPATIENT
Start: 2017-10-05 | End: 2017-10-15

## 2017-10-05 ASSESSMENT — ENCOUNTER SYMPTOMS
WHEEZING: 1
FEVER: 0
COUGH: 1
CHILLS: 0

## 2017-10-05 ASSESSMENT — COPD QUESTIONNAIRES: COPD: 1

## 2017-10-05 NOTE — PROGRESS NOTES
"Subjective:      Parmjit Castelan is a 77 y.o. male who presents with Cough (wheezing, chest congestion x 2.5 days )    Past Medical History:   Diagnosis Date   • Leg pain, bilateral 8/10/2015   • Basal cell carcinoma of left cheek 3/26/2015   • CAD (coronary artery disease) 2/20/2014   • Bronchitis 1/1/13   • COPD (chronic obstructive pulmonary disease) (CMS-HCC) 8/2/2011   • HTN (hypertension) 1/15/2010   • CKD (chronic kidney disease) stage 4, GFR 15-29 ml/min (CMS-HCC) 1/15/2010    end stage renal disease   • BPH 7/14/2009   • Anesthesia     \"needs more sedation\" (can sometimes hear MD during procedure)    • CATARACT     sanjuanita surgery complete   • COPD (chronic obstructive pulmonary disease) (CMS-HCC)    • Detached retina    • Dialysis     m,w,f [facility]   • EMPHYSEMA    • Gout    • Heart burn     occas   • High cholesterol    • Indigestion     occas   • Kidney cyst    • On supplemental oxygen therapy    • Pneumonia    • Proteinuria    • Sleep apnea     O2 PER CANULA  AT NIGHT 2l/m   • Snoring      Social History     Social History   • Marital status:      Spouse name: N/A   • Number of children: N/A   • Years of education: N/A     Occupational History   • Not on file.     Social History Main Topics   • Smoking status: Former Smoker     Packs/day: 1.00     Years: 40.00     Types: Cigarettes     Quit date: 1/1/2009   • Smokeless tobacco: Never Used      Comment: 1 pk a day for 35 yrs, QUIT JAN 1 2010   • Alcohol use No   • Drug use: No   • Sexual activity: Not on file     Other Topics Concern   • Not on file     Social History Narrative   • No narrative on file     Family History   Problem Relation Age of Onset   • Lung Disease Father      Emphysema, resp failure   • Genitourinary () Maternal Aunt      hematuria   • Stroke Mother    • Hypertension Mother    • Hypertension Brother      Allergies: Review of patient's allergies indicates no known allergies.    This patient is a 77-year-old male who presents today " "with complaint of shortness of breath, cough, and wheezing. Patient has a history of COPD. He usually takes albuterol by hand-held nebulizer at home, but is out of his albuterol.        Cough   This is a recurrent problem. The current episode started in the past 7 days. The problem has been gradually worsening. The problem occurs every few minutes. The cough is productive of sputum. Associated symptoms include wheezing. Pertinent negatives include no chills or fever. Nothing aggravates the symptoms. He has tried nothing for the symptoms. The treatment provided no relief. His past medical history is significant for COPD.       Review of Systems   Constitutional: Negative for chills and fever.   Respiratory: Positive for cough and wheezing.    All other systems reviewed and are negative.         Objective:     /64   Pulse 78   Temp 37.2 °C (98.9 °F)   Resp 18   Ht 1.74 m (5' 8.5\")   Wt 69.4 kg (153 lb)   SpO2 98%   BMI 22.93 kg/m²      Physical Exam   Constitutional: He is oriented to person, place, and time. He appears well-developed and well-nourished.   HENT:   Head: Normocephalic.   Right Ear: External ear normal.   Left Ear: External ear normal.   Nose: Nose normal.   Mouth/Throat: Oropharynx is clear and moist. No oropharyngeal exudate.   Eyes: Conjunctivae and EOM are normal. Pupils are equal, round, and reactive to light. Right eye exhibits no discharge. Left eye exhibits no discharge.   Neck: Normal range of motion. Neck supple.   Cardiovascular: Normal rate and regular rhythm.    Pulmonary/Chest: Effort normal.   Few scattered wheezes noted   Musculoskeletal: Normal range of motion.   Neurological: He is alert and oriented to person, place, and time.   Skin: Skin is warm and dry. Capillary refill takes less than 2 seconds.   Psychiatric: He has a normal mood and affect. His behavior is normal. Judgment and thought content normal.   Vitals reviewed.              Assessment/Plan: "   Bronchitis  Cough  History of COPD  -doxycycline  -refilled albuterol  -robitussen as needed  -ER precautions for respiratory distress  There are no diagnoses linked to this encounter.

## 2017-11-17 ENCOUNTER — APPOINTMENT (RX ONLY)
Dept: URBAN - METROPOLITAN AREA CLINIC 4 | Facility: CLINIC | Age: 77
Setting detail: DERMATOLOGY
End: 2017-11-17

## 2017-11-17 DIAGNOSIS — L56.8 OTHER SPECIFIED ACUTE SKIN CHANGES DUE TO ULTRAVIOLET RADIATION: ICD-10-CM

## 2017-11-17 PROBLEM — Z85.828 PERSONAL HISTORY OF OTHER MALIGNANT NEOPLASM OF SKIN: Status: ACTIVE | Noted: 2017-11-17

## 2017-11-17 PROCEDURE — ? PRESCRIPTION

## 2017-11-17 PROCEDURE — ? COUNSELING

## 2017-11-17 PROCEDURE — 99212 OFFICE O/P EST SF 10 MIN: CPT

## 2017-11-17 RX ORDER — MUPIROCIN 20 MG/G
OINTMENT TOPICAL
Qty: 1 | Refills: 6 | Status: ERX | COMMUNITY
Start: 2017-11-17

## 2017-11-17 RX ORDER — TRIAMCINOLONE ACETONIDE 0.25 MG/G
CREAM TOPICAL
Qty: 1 | Refills: 0 | Status: ERX | COMMUNITY
Start: 2017-11-17

## 2017-11-17 RX ADMIN — TRIAMCINOLONE ACETONIDE: 0.25 CREAM TOPICAL at 20:21

## 2017-11-17 RX ADMIN — MUPIROCIN: 20 OINTMENT TOPICAL at 20:21

## 2017-11-17 ASSESSMENT — LOCATION ZONE DERM: LOCATION ZONE: HAND

## 2017-11-17 ASSESSMENT — LOCATION SIMPLE DESCRIPTION DERM
LOCATION SIMPLE: RIGHT HAND
LOCATION SIMPLE: LEFT HAND

## 2017-11-17 ASSESSMENT — LOCATION DETAILED DESCRIPTION DERM
LOCATION DETAILED: RIGHT ULNAR DORSAL HAND
LOCATION DETAILED: LEFT ULNAR DORSAL HAND

## 2017-12-11 ENCOUNTER — APPOINTMENT (OUTPATIENT)
Dept: ADMISSIONS | Facility: MEDICAL CENTER | Age: 77
End: 2017-12-11
Attending: SURGERY
Payer: MEDICARE

## 2017-12-11 RX ORDER — CLONIDINE HYDROCHLORIDE 0.2 MG/1
0.2 TABLET ORAL 2 TIMES DAILY
COMMUNITY
End: 2018-03-19

## 2017-12-11 RX ORDER — CLOPIDOGREL BISULFATE 75 MG/1
75 TABLET ORAL DAILY
COMMUNITY
End: 2018-03-19

## 2017-12-11 RX ORDER — DOXAZOSIN MESYLATE 4 MG/1
4 TABLET ORAL
COMMUNITY
End: 2018-01-19 | Stop reason: SDUPTHER

## 2017-12-11 RX ORDER — FAMOTIDINE 20 MG/1
20 TABLET, FILM COATED ORAL DAILY
COMMUNITY
End: 2018-03-19

## 2017-12-12 ENCOUNTER — APPOINTMENT (OUTPATIENT)
Dept: RADIOLOGY | Facility: MEDICAL CENTER | Age: 77
End: 2017-12-12
Attending: SURGERY
Payer: MEDICARE

## 2017-12-12 ENCOUNTER — HOSPITAL ENCOUNTER (OUTPATIENT)
Facility: MEDICAL CENTER | Age: 77
End: 2017-12-12
Attending: SURGERY | Admitting: SURGERY
Payer: MEDICARE

## 2017-12-12 VITALS
HEIGHT: 68 IN | OXYGEN SATURATION: 94 % | DIASTOLIC BLOOD PRESSURE: 51 MMHG | HEART RATE: 64 BPM | WEIGHT: 152 LBS | TEMPERATURE: 97.8 F | BODY MASS INDEX: 23.04 KG/M2 | SYSTOLIC BLOOD PRESSURE: 119 MMHG | RESPIRATION RATE: 16 BRPM

## 2017-12-12 DIAGNOSIS — N18.5 CHRONIC KIDNEY DISEASE, STAGE V (HCC): ICD-10-CM

## 2017-12-12 DIAGNOSIS — T82.858A STENOSIS OF OTHER VASCULAR PROSTHETIC DEVICES, IMPLANTS AND GRAFTS, INITIAL ENCOUNTER (HCC): ICD-10-CM

## 2017-12-12 LAB
ANION GAP SERPL CALC-SCNC: 10 MMOL/L (ref 0–11.9)
BUN SERPL-MCNC: 37 MG/DL (ref 8–22)
CALCIUM SERPL-MCNC: 10.4 MG/DL (ref 8.5–10.5)
CHLORIDE SERPL-SCNC: 95 MMOL/L (ref 96–112)
CO2 SERPL-SCNC: 33 MMOL/L (ref 20–33)
CREAT SERPL-MCNC: 6.57 MG/DL (ref 0.5–1.4)
ERYTHROCYTE [DISTWIDTH] IN BLOOD BY AUTOMATED COUNT: 55.3 FL (ref 35.9–50)
GFR SERPL CREATININE-BSD FRML MDRD: 8 ML/MIN/1.73 M 2
GLUCOSE SERPL-MCNC: 94 MG/DL (ref 65–99)
HCT VFR BLD AUTO: 39.6 % (ref 42–52)
HGB BLD-MCNC: 12.7 G/DL (ref 14–18)
MCH RBC QN AUTO: 32.6 PG (ref 27–33)
MCHC RBC AUTO-ENTMCNC: 32.1 G/DL (ref 33.7–35.3)
MCV RBC AUTO: 101.8 FL (ref 81.4–97.8)
PLATELET # BLD AUTO: 200 K/UL (ref 164–446)
PMV BLD AUTO: 9.7 FL (ref 9–12.9)
POTASSIUM SERPL-SCNC: 4.9 MMOL/L (ref 3.6–5.5)
RBC # BLD AUTO: 3.89 M/UL (ref 4.7–6.1)
SODIUM SERPL-SCNC: 138 MMOL/L (ref 135–145)
WBC # BLD AUTO: 4.7 K/UL (ref 4.8–10.8)

## 2017-12-12 PROCEDURE — 700111 HCHG RX REV CODE 636 W/ 250 OVERRIDE (IP)

## 2017-12-12 PROCEDURE — 85027 COMPLETE CBC AUTOMATED: CPT

## 2017-12-12 PROCEDURE — C2628 CATHETER, OCCLUSION: HCPCS

## 2017-12-12 PROCEDURE — 700111 HCHG RX REV CODE 636 W/ 250 OVERRIDE (IP): Performed by: SURGERY

## 2017-12-12 PROCEDURE — 80048 BASIC METABOLIC PNL TOTAL CA: CPT

## 2017-12-12 PROCEDURE — 160002 HCHG RECOVERY MINUTES (STAT)

## 2017-12-12 RX ORDER — MIDAZOLAM HYDROCHLORIDE 1 MG/ML
INJECTION INTRAMUSCULAR; INTRAVENOUS
Status: COMPLETED
Start: 2017-12-12 | End: 2017-12-12

## 2017-12-12 RX ORDER — SODIUM CHLORIDE 9 MG/ML
500 INJECTION, SOLUTION INTRAVENOUS
Status: DISCONTINUED | OUTPATIENT
Start: 2017-12-12 | End: 2017-12-12 | Stop reason: HOSPADM

## 2017-12-12 RX ORDER — DIPHENHYDRAMINE HYDROCHLORIDE 50 MG/ML
INJECTION INTRAMUSCULAR; INTRAVENOUS
Status: COMPLETED
Start: 2017-12-12 | End: 2017-12-12

## 2017-12-12 RX ORDER — MIDAZOLAM HYDROCHLORIDE 1 MG/ML
.5-2 INJECTION INTRAMUSCULAR; INTRAVENOUS PRN
Status: DISCONTINUED | OUTPATIENT
Start: 2017-12-12 | End: 2017-12-12 | Stop reason: HOSPADM

## 2017-12-12 RX ORDER — DIPHENHYDRAMINE HYDROCHLORIDE 50 MG/ML
50 INJECTION INTRAMUSCULAR; INTRAVENOUS ONCE
Status: COMPLETED | OUTPATIENT
Start: 2017-12-12 | End: 2017-12-12

## 2017-12-12 RX ADMIN — MIDAZOLAM 2 MG: 1 INJECTION INTRAMUSCULAR; INTRAVENOUS at 11:25

## 2017-12-12 RX ADMIN — FENTANYL CITRATE 25 MCG: 50 INJECTION, SOLUTION INTRAMUSCULAR; INTRAVENOUS at 11:25

## 2017-12-12 RX ADMIN — FENTANYL CITRATE 25 MCG: 50 INJECTION, SOLUTION INTRAMUSCULAR; INTRAVENOUS at 11:36

## 2017-12-12 RX ADMIN — FENTANYL CITRATE 25 MCG: 50 INJECTION, SOLUTION INTRAMUSCULAR; INTRAVENOUS at 11:00

## 2017-12-12 RX ADMIN — DIPHENHYDRAMINE HYDROCHLORIDE 50 MG: 50 INJECTION INTRAMUSCULAR; INTRAVENOUS at 11:25

## 2017-12-12 RX ADMIN — MIDAZOLAM 1 MG: 1 INJECTION INTRAMUSCULAR; INTRAVENOUS at 11:37

## 2017-12-12 RX ADMIN — MIDAZOLAM 1 MG: 1 INJECTION INTRAMUSCULAR; INTRAVENOUS at 11:00

## 2017-12-12 ASSESSMENT — PAIN SCALES - GENERAL
PAINLEVEL_OUTOF10: 0

## 2017-12-12 NOTE — PROGRESS NOTES
1230   PATIENT RECEIVED FROM IR,  S/P LEFT ARM FISTULOGRAM.  PATIENT IS DROWSY BUT AROUSABLE.  WIFE AT BEDSIDE.  LEFT ARM INCISION WITH DRESSING INTACT.     1300  PATIENT TAKING PO FLUID WELL.    1340   DC INSTRUCTIONS GIVEN TO PATIENT AND WIFE. VERBALIZED UNDERSTANDING OF ALL.  HL DC.  PATIENT DC TO HOME VIA WHEELCHAIR, ESCORTED OUT BY CNA.

## 2017-12-12 NOTE — DISCHARGE INSTRUCTIONS
ACTIVITY: Rest and take it easy for the first 24 hours.  A responsible adult is recommended to remain with you during that time.  It is normal to feel sleepy.  We encourage you to not do anything that requires balance, judgment or coordination.    MILD FLU-LIKE SYMPTOMS ARE NORMAL. YOU MAY EXPERIENCE GENERALIZED MUSCLE ACHES, THROAT IRRITATION, HEADACHE AND/OR SOME NAUSEA.    FOR 24 HOURS DO NOT:  Drive, operate machinery or run household appliances.  Drink beer or alcoholic beverages.   Make important decisions or sign legal documents.    SPECIAL INSTRUCTIONS: **KEEP INCISION DRY FOR 24 HRS*    DIET: To avoid nausea, slowly advance diet as tolerated, avoiding spicy or greasy foods for the first day.  Add more substantial food to your diet according to your physician's instructions.  Babies can be fed formula or breast milk as soon as they are hungry.  INCREASE FLUIDS AND FIBER TO AVOID CONSTIPATION.    SURGICAL DRESSING/BATHING: *MAY SHOWER TOMORROW AFTERNOON BUT NEED TO COVER SITE WITH SARAN WRAP**    FOLLOW-UP APPOINTMENT:  A follow-up appointment should be arranged with your doctor in *[facility]   [phone] **; call to schedule.    You should CALL YOUR PHYSICIAN if you develop:  Fever greater than 101 degrees F.  Pain not relieved by medication, or persistent nausea or vomiting.  Excessive bleeding (blood soaking through dressing) or unexpected drainage from the wound.  Extreme redness or swelling around the incision site, drainage of pus or foul smelling drainage.  Inability to urinate or empty your bladder within 8 hours.  Problems with breathing or chest pain.    You should call 911 if you develop problems with breathing or chest pain.  If you are unable to contact your doctor or surgical center, you should go to the nearest emergency room or urgent care center.  Physician's telephone #: *[phone]**    If any questions arise, call your doctor.  If your doctor is not available, please feel free to call the  Surgical Center at [phone].  The Center is open Monday through Friday from 7AM to 7PM.  You can also call the HEALTH HOTLINE open 24 hours/day, 7 days/week and speak to a nurse at [phone], or toll free at [phone].    A registered nurse may call you a few days after your surgery to see how you are doing after your procedure.    MEDICATIONS: Resume taking daily medication.  Take prescribed pain medication with food.  If no medication is prescribed, you may take non-aspirin pain medication if needed.  PAIN MEDICATION CAN BE VERY CONSTIPATING.  Take a stool softener or laxative such as senokot, pericolace, or milk of magnesia if needed.    If your physician has prescribed pain medication that includes Acetaminophen (Tylenol), do not take additional Acetaminophen (Tylenol) while taking the prescribed medication.    Depression / Suicide Risk    As you are discharged from this [facility] facility, it is important to learn how to keep safe from harming yourself.    Recognize the warning signs:  · Abrupt changes in personality, positive or negative- including increase in energy   · Giving away possessions  · Change in eating patterns- significant weight changes-  positive or negative  · Change in sleeping patterns- unable to sleep or sleeping all the time   · Unwillingness or inability to communicate  · Depression  · Unusual sadness, discouragement and loneliness  · Talk of wanting to die  · Neglect of personal appearance   · Rebelliousness- reckless behavior  · Withdrawal from people/activities they love  · Confusion- inability to concentrate     If you or a loved one observes any of these behaviors or has concerns about self-harm, here's what you can do:  · Talk about it- your feelings and reasons for harming yourself  · Remove any means that you might use to hurt yourself (examples: pills, rope, extension cords, firearm)  · Get professional help from the community (Mental Health, Substance Abuse,  psychological counseling)  · Do not be alone:Call your Safe Contact- someone whom you trust who will be there for you.  · Call your local CRISIS HOTLINE 051-2156 or 781-535-2008  · Call your local Children's Mobile Crisis Response Team [place] (195) 803-6892 or www.[place]  · Call the toll free National Suicide Prevention Hotlines   · National Suicide Prevention Lifeline 951-043-ECTF (6127)  · National Hope Line Network 800-SUICIDE (085-8944)

## 2017-12-12 NOTE — PROGRESS NOTES
IR Procedure RN's Note:    LEFT arm fistulogram with intervention done by Dr. Cason; LEFT existing fistula access site; angioplasties were performed into the fistula; pt tolerated the procedure well; pt comfortable throughout the procedure but complained of itchiness throughout even prior to start of procedure, benadryl was given for comfort; ETCO2 connected and trouble-shooted but did not give a reading; report given to Wandy; manual pressure held for 15min by RT, site CDI, soft, no signs of hematoma at this time, gauze and tegaderm for dressing; pt transported by this RN; pt arousable and meet transport criteria prior to PPU drop off; family taken back with patient.

## 2017-12-12 NOTE — OP REPORT
DATE OF SERVICE:  12/12/2017    PREOPERATIVE DIAGNOSES:  Stage V kidney disease with left distal radiocephalic   arteriovenous fistula dysfunction secondary to severe stenoses.    POSTOPERATIVE DIAGNOSIS:  Stage V kidney disease with left distal   radiocephalic arteriovenous fistula dysfunction secondary to severe stenoses   with the additional finding of chronically occluded cephalic arch stent.    PROCEDURE:  1.  Left antecubital vein 6-Welsh sheath placement directed toward the wrist   under ultrasound and fluoroscopic guidance.  2.  Catheter passage into the wrist level and fistulograms, left forearm.  3.  Arm and central venograms.  4.  Balloon angioplasty of severe distal stenoses adjacent to the anastomosis   up to 6 mm Conquest followed by 7 mm Conquest.  4.  Balloon angioplasties mid forearm region an area of segmental [place]-Tin   graft dilated with 6 mm, then 7 mm Conquest.    SURGEON:  David Cason MD    ANESTHESIA:  Moderate conscious sedation and local of 1% Xylocaine injected   around the wrist region where one of the angioplasties was to be performed.    DESCRIPTION OF PROCEDURE:  After obtaining informed consent, the patient was   positioned supine on the angiography table.  His left upper extremity was   prepped with ChloraPrep and draped.  He was given moderate conscious sedation   and remained stable in terms of oximetry and vital signs through the   procedure.  At the antecubital level, I injected 1% Xylocaine and used a   micropuncture needle, wire and dilator under ultrasound guidance to place the   dilator followed by a 6-Welsh sheath.  Through the sheath, a Glidewire was   passed followed by a [place] catheter, which was directed into the wrist.    General injection of contrast revealed the palmar arch.  We gently pulled back   on the catheter until it was at the arteriovenous anastomosis at the wrist.    Digital subtraction images were then performed showing severe stenoses over   the 4  cm extending proximally from the wrist anastomosis.  I used a 6 mm   Conquest based on prior experience when we had a small pseudoaneurysm from a 7   mm Conquest.  This was dilated to 8 atmospheres and the dilatation was   repeated with some slight adjustment of the balloon.  Images obtained through   the catheter passage, which was repeated showed improvement but still some   narrowing.  We then utilized a 7 mm Conquest to get a better diameter and   dilated this to 7-6 atmospheres several times.  This showed improvement.  We   dilated in the mid forearm with both balloons and the 7 was repeated several   times  relieving some significant mid forearm stenosis.  We analyzed the   outflow, which was rapid through brachial veins and through the basilic vein.    The cephalic vein is patent, but at the cephalic arch, it is occluded in a   stent.  Just distal to the stent, there is a venous collateral that I noticed   a few years ago that was joining and other venous outflow tract and providing   outflow for this biceps region.  Cephalic vein which looks very prominent on   physical examination and if this outflow continues to develop, it may be a   suitable outflow in the future.  It might not be ideal for a primary fistula,   however.  We did not see any central venous stenosis on imaging of the chest.    We removed the sheath and held pressure and hemostasis was achieved.  A good   thrill was present in the wrist anastomosis region.  Blood loss was less than   20 mL.  The patient tolerated the procedure well.  Wound was dressed.  He was   transferred to post-procedure unit for recovery and discharge.       ____________________________________     MD VILMA Fowler / DELANO    DD:  12/12/2017 12:21:28  DT:  12/12/2017 12:44:46    D#:  [id number]  Job#:  [id number]

## 2018-01-09 ENCOUNTER — TELEPHONE (OUTPATIENT)
Dept: PULMONOLOGY | Facility: HOSPICE | Age: 78
End: 2018-01-09

## 2018-01-09 DIAGNOSIS — R06.00 DYSPNEA, UNSPECIFIED TYPE: ICD-10-CM

## 2018-01-19 RX ORDER — DOXAZOSIN MESYLATE 4 MG/1
TABLET ORAL
Qty: 90 TAB | Refills: 1 | Status: SHIPPED | OUTPATIENT
Start: 2018-01-19 | End: 2018-08-03 | Stop reason: SDUPTHER

## 2018-01-23 ENCOUNTER — APPOINTMENT (OUTPATIENT)
Dept: RADIOLOGY | Facility: MEDICAL CENTER | Age: 78
End: 2018-01-23
Attending: SURGERY
Payer: MEDICARE

## 2018-01-23 ENCOUNTER — HOSPITAL ENCOUNTER (OUTPATIENT)
Facility: MEDICAL CENTER | Age: 78
End: 2018-01-23
Attending: SURGERY | Admitting: SURGERY
Payer: MEDICARE

## 2018-01-23 VITALS
OXYGEN SATURATION: 87 % | DIASTOLIC BLOOD PRESSURE: 65 MMHG | HEART RATE: 60 BPM | WEIGHT: 152.12 LBS | TEMPERATURE: 97.5 F | RESPIRATION RATE: 17 BRPM | HEIGHT: 68 IN | SYSTOLIC BLOOD PRESSURE: 170 MMHG | BODY MASS INDEX: 23.05 KG/M2

## 2018-01-23 DIAGNOSIS — I70.213 ATHSCL NATIVE ARTERIES OF EXTRM W INTRMT CLAUD, BI LEGS (HCC): ICD-10-CM

## 2018-01-23 LAB
ANION GAP SERPL CALC-SCNC: 11 MMOL/L (ref 0–11.9)
BUN SERPL-MCNC: 43 MG/DL (ref 8–22)
CALCIUM SERPL-MCNC: 10.3 MG/DL (ref 8.5–10.5)
CHLORIDE SERPL-SCNC: 97 MMOL/L (ref 96–112)
CO2 SERPL-SCNC: 32 MMOL/L (ref 20–33)
CREAT SERPL-MCNC: 7.13 MG/DL (ref 0.5–1.4)
ERYTHROCYTE [DISTWIDTH] IN BLOOD BY AUTOMATED COUNT: 61.8 FL (ref 35.9–50)
GLUCOSE SERPL-MCNC: 94 MG/DL (ref 65–99)
HCT VFR BLD AUTO: 33.9 % (ref 42–52)
HGB BLD-MCNC: 10.8 G/DL (ref 14–18)
MCH RBC QN AUTO: 33.6 PG (ref 27–33)
MCHC RBC AUTO-ENTMCNC: 31.9 G/DL (ref 33.7–35.3)
MCV RBC AUTO: 105.6 FL (ref 81.4–97.8)
PLATELET # BLD AUTO: 186 K/UL (ref 164–446)
PMV BLD AUTO: 10.4 FL (ref 9–12.9)
POTASSIUM SERPL-SCNC: 5.3 MMOL/L (ref 3.6–5.5)
RBC # BLD AUTO: 3.21 M/UL (ref 4.7–6.1)
SODIUM SERPL-SCNC: 140 MMOL/L (ref 135–145)
WBC # BLD AUTO: 4 K/UL (ref 4.8–10.8)

## 2018-01-23 PROCEDURE — 160002 HCHG RECOVERY MINUTES (STAT)

## 2018-01-23 PROCEDURE — A9270 NON-COVERED ITEM OR SERVICE: HCPCS

## 2018-01-23 PROCEDURE — 700111 HCHG RX REV CODE 636 W/ 250 OVERRIDE (IP)

## 2018-01-23 PROCEDURE — 80048 BASIC METABOLIC PNL TOTAL CA: CPT

## 2018-01-23 PROCEDURE — 99153 MOD SED SAME PHYS/QHP EA: CPT

## 2018-01-23 PROCEDURE — 85027 COMPLETE CBC AUTOMATED: CPT

## 2018-01-23 PROCEDURE — 700111 HCHG RX REV CODE 636 W/ 250 OVERRIDE (IP): Performed by: SURGERY

## 2018-01-23 PROCEDURE — 700102 HCHG RX REV CODE 250 W/ 637 OVERRIDE(OP)

## 2018-01-23 RX ORDER — HYDRALAZINE HYDROCHLORIDE 20 MG/ML
20 INJECTION INTRAMUSCULAR; INTRAVENOUS ONCE
Status: COMPLETED | OUTPATIENT
Start: 2018-01-23 | End: 2018-01-23

## 2018-01-23 RX ORDER — VERAPAMIL HYDROCHLORIDE 2.5 MG/ML
INJECTION, SOLUTION INTRAVENOUS
Status: COMPLETED
Start: 2018-01-23 | End: 2018-01-23

## 2018-01-23 RX ORDER — HYDRALAZINE HYDROCHLORIDE 20 MG/ML
INJECTION INTRAMUSCULAR; INTRAVENOUS
Status: COMPLETED
Start: 2018-01-23 | End: 2018-01-23

## 2018-01-23 RX ORDER — PROTAMINE SULFATE 10 MG/ML
INJECTION, SOLUTION INTRAVENOUS
Status: COMPLETED
Start: 2018-01-23 | End: 2018-01-23

## 2018-01-23 RX ORDER — HEPARIN SODIUM 1000 [USP'U]/ML
1000 INJECTION, SOLUTION INTRAVENOUS; SUBCUTANEOUS ONCE
Status: COMPLETED | OUTPATIENT
Start: 2018-01-23 | End: 2018-01-23

## 2018-01-23 RX ORDER — OXYCODONE AND ACETAMINOPHEN 10; 325 MG/1; MG/1
1 TABLET ORAL EVERY 4 HOURS PRN
Status: DISCONTINUED | OUTPATIENT
Start: 2018-01-23 | End: 2018-01-23 | Stop reason: HOSPADM

## 2018-01-23 RX ORDER — HEPARIN SODIUM,PORCINE 1000/ML
VIAL (ML) INJECTION
Status: COMPLETED
Start: 2018-01-23 | End: 2018-01-23

## 2018-01-23 RX ORDER — SODIUM CHLORIDE 9 MG/ML
500 INJECTION, SOLUTION INTRAVENOUS
Status: DISPENSED | OUTPATIENT
Start: 2018-01-23 | End: 2018-01-23

## 2018-01-23 RX ORDER — MIDAZOLAM HYDROCHLORIDE 1 MG/ML
INJECTION INTRAMUSCULAR; INTRAVENOUS
Status: COMPLETED
Start: 2018-01-23 | End: 2018-01-23

## 2018-01-23 RX ORDER — SODIUM CHLORIDE 9 MG/ML
INJECTION, SOLUTION INTRAVENOUS
Status: DISCONTINUED | OUTPATIENT
Start: 2018-01-23 | End: 2018-01-23 | Stop reason: HOSPADM

## 2018-01-23 RX ORDER — MIDAZOLAM HYDROCHLORIDE 1 MG/ML
.5-2 INJECTION INTRAMUSCULAR; INTRAVENOUS PRN
Status: ACTIVE | OUTPATIENT
Start: 2018-01-23 | End: 2018-01-23

## 2018-01-23 RX ORDER — HEPARIN SODIUM 1000 [USP'U]/ML
7000 INJECTION, SOLUTION INTRAVENOUS; SUBCUTANEOUS ONCE
Status: COMPLETED | OUTPATIENT
Start: 2018-01-23 | End: 2018-01-23

## 2018-01-23 RX ORDER — HYDRALAZINE HYDROCHLORIDE 20 MG/ML
INJECTION INTRAMUSCULAR; INTRAVENOUS
Status: DISCONTINUED
Start: 2018-01-23 | End: 2018-01-23 | Stop reason: HOSPADM

## 2018-01-23 RX ORDER — VERAPAMIL HYDROCHLORIDE 2.5 MG/ML
5 INJECTION, SOLUTION INTRAVENOUS
Status: DISCONTINUED | OUTPATIENT
Start: 2018-01-23 | End: 2018-01-23 | Stop reason: HOSPADM

## 2018-01-23 RX ORDER — PROTAMINE SULFATE 10 MG/ML
70 INJECTION, SOLUTION INTRAVENOUS ONCE
Status: COMPLETED | OUTPATIENT
Start: 2018-01-23 | End: 2018-01-23

## 2018-01-23 RX ORDER — OXYCODONE AND ACETAMINOPHEN 10; 325 MG/1; MG/1
TABLET ORAL
Status: COMPLETED
Start: 2018-01-23 | End: 2018-01-23

## 2018-01-23 RX ADMIN — HYDRALAZINE HYDROCHLORIDE 20 MG: 20 INJECTION, SOLUTION INTRAMUSCULAR; INTRAVENOUS at 12:25

## 2018-01-23 RX ADMIN — VERAPAMIL HYDROCHLORIDE 5 MG: 2.5 INJECTION, SOLUTION INTRAVENOUS at 11:40

## 2018-01-23 RX ADMIN — NITROGLYCERIN 25 ML: 20 INJECTION INTRAVENOUS at 11:41

## 2018-01-23 RX ADMIN — HEPARIN SODIUM 1000 UNITS: 1000 INJECTION, SOLUTION INTRAVENOUS; SUBCUTANEOUS at 12:07

## 2018-01-23 RX ADMIN — MIDAZOLAM 1 MG: 1 INJECTION INTRAMUSCULAR; INTRAVENOUS at 11:55

## 2018-01-23 RX ADMIN — HEPARIN SODIUM 7000 UNITS: 1000 INJECTION, SOLUTION INTRAVENOUS; SUBCUTANEOUS at 11:07

## 2018-01-23 RX ADMIN — MIDAZOLAM 1 MG: 1 INJECTION INTRAMUSCULAR; INTRAVENOUS at 10:25

## 2018-01-23 RX ADMIN — PROTAMINE SULFATE 70 MG: 10 INJECTION, SOLUTION INTRAVENOUS at 12:28

## 2018-01-23 RX ADMIN — HEPARIN SODIUM 1000 UNITS: 1000 INJECTION, SOLUTION INTRAVENOUS; SUBCUTANEOUS at 11:17

## 2018-01-23 RX ADMIN — HYDRALAZINE HYDROCHLORIDE 20 MG: 20 INJECTION INTRAMUSCULAR; INTRAVENOUS at 12:25

## 2018-01-23 RX ADMIN — HYDRALAZINE HYDROCHLORIDE 20 MG: 20 INJECTION INTRAMUSCULAR; INTRAVENOUS at 14:11

## 2018-01-23 RX ADMIN — FENTANYL CITRATE 25 MCG: 50 INJECTION, SOLUTION INTRAMUSCULAR; INTRAVENOUS at 10:25

## 2018-01-23 RX ADMIN — OXYCODONE HYDROCHLORIDE AND ACETAMINOPHEN 1 TABLET: 10; 325 TABLET ORAL at 13:20

## 2018-01-23 RX ADMIN — SODIUM CHLORIDE: 9 INJECTION, SOLUTION INTRAVENOUS at 08:15

## 2018-01-23 RX ADMIN — MIDAZOLAM 1 MG: 1 INJECTION INTRAMUSCULAR; INTRAVENOUS at 12:14

## 2018-01-23 RX ADMIN — MIDAZOLAM 1 MG: 1 INJECTION INTRAMUSCULAR; INTRAVENOUS at 11:10

## 2018-01-23 ASSESSMENT — PAIN SCALES - GENERAL
PAINLEVEL_OUTOF10: 0

## 2018-01-23 NOTE — OP REPORT
DATE OF SERVICE:  01/23/2018    PREOPERATIVE DIAGNOSES:  Rest pain, ischemia, right lower extremity.    POSTOPERATIVE DIAGNOSES:  Rest pain, ischemia, right lower extremity.    OPERATIONS:  1.  Left femoral sheath placement under ultrasound and fluoroscopic guidance.  2.  Abdominal aortogram.  3.  Pull down of catheter and bilateral lower extremity runoff angiograms.  4.  Left to right catheter passage with selective right lower extremity   angiograms.  5.  Placement of embolic protection filter, Emboshield 7.2 mm in below-knee   popliteal after 2.5 mm balloon angioplasty of severely obstructive lesions to   allow passage of filter.  6.  Jetstream 2.4 mm atherectomy in short segments in the proximal superficial   femoral artery and mid superficial femoral artery, and long segment from the   adductor canal down through the popliteal artery.  7.  Drug-coated balloon angioplasties adductor canal through the popliteal   artery using IN.PACT balloons 4 mm x 150 mm and 4 mm x 80 mm.  8.  Retrieval of filter and right lower extremity angiogram showing widely   patent trifurcation runoff and rapid flow.  9.  Unsuccessful StarClose, left femoral artery.    SURGEON:  David Cason MD    ANESTHESIA:  Moderate conscious sedation and local.    SUMMARY:  This patient has a patent abdominal aorta and iliac arteries.  There   is plaque that made it hard to pass wires and catheters without getting   hooked up on a piece of plaque.  The right lower extremity had significant   stenoses secondary to severe calcific plaque and as were described in the   noninvasive test showing several high-grade stenoses in the thigh region and   critical stenosis at the adductor canal and in the popliteal artery.  The   below-knee popliteal artery is patent and without plaque from the distal 2 or   3 cm at most and could be a distal target vessel if bypass is necessary.  The   left lower extremity had much less disease than the right, but did  have a few   significant lesions, but is not as ischemic as the right and we did not   attempt to treat those.    Following intervention, flow was rapid through the right superficial femoral   artery system, although extensive disease remains.    DESCRIPTION OF PROCEDURE:  After obtaining informed consent, the groins were   prepped with ChloraPrep and draped sterilely.  The patient was given moderate   conscious sedation and was stable except for some hypertension at the end of   the case.  I used ultrasound to place a left femoral 5-Mongolian sheath.  I   followed this by a Glidewire and an Omniflush catheter positioned at L1.  A   digital subtraction aortogram was performed.  The catheter was pulled down to   above the bifurcation and we did imaging in a stepwise fashion all the way   down to the trifurcations bilaterally, which were patent.  The right lower   extremity was purpose for treating the patient today.  It is critically   ischemic.  I used a Glidewire and an Omniflush catheter.  I was able to get   the Glidewire over the bifurcation and down the superficial femoral artery   with some manipulation through some stenotic lesions.  We tried using a   [place] catheter over the Glidewire since the Omniflush catheter would not   pass over the bifurcation.  This did not work.  A Berenstein 4-Mongolian catheter   did work and was successful in getting catheter access into the proximal   right superficial femoral artery.  We passed a magic torque wire and it was   able to traverse two-thirds of the superficial femoral artery.  We used this   to place a 7-Mongolian sheath in the left groin over the bifurcation and down to   the origin of the superficial femoral.  This was a 45 cm long destination   sheath.  The patient was given heparin 7000 units and later in the case,   another 1000 units x2.  We took a Command wire and manipulated it down through   the plaques and areas of high-grade stenoses.  We reached the occlusive    lesions in the popliteal.  To traverse these, I supported the Command wire   with a 135 cm [place] catheter and was able to get through the lesions that   were obstructing flow and into the below-knee popliteal artery.  I passed the   wire down short distance into the peroneal artery.  We then advanced the   [place] catheter until it was through the tight lesions.  We exchanged the   Command wire for a bare wire.  We attempted to pass an Emboshield filter, but   it would not cross through the plaque in the adductor canal region.  We   therefore passed a 2.5 mm x 80 mm [place] balloon and predilated these areas.    We then were able to pass the Emboshield filter and position it right at the   origin of the tibioperoneal trunk and the anterior tibial arteries.  Following   this, we passed the 2.4 mm Jetstream.  This Jetstream had to be utilized to   get down to the distal lesions.  In the thigh region, there was a shelf-like   calcific plaque.  We did 3 passes across this plaque to open that area out.    There was a second area in approximately mid thigh that we had to use the   Jetstream to get the Jetstream device through the area.  We finally reached   the most severe lesions and spent over 8 minutes with Jetstream to get through   all the occlusions down into the below-knee popliteal artery.  We retrieved   the Jetstream.  We then predilated the adductor canal and popliteal arteries   with a 4 mm x 220 mm balloon.  This was followed by a drug-coated balloon   using IN.PACT.  We used 2 balloons to cover the area, one was 150 mm long and   the other was 80 mm long, and both were 4 mm diameter.  The images after this   showed that the previously small diameter below-knee distal popliteal artery   now had dilated up to its more normal diameter with flow.  Flow was rapid   through the system.  There were irregularities secondary to residual plaque.    The flow, however, was dramatically improved.  We retrieved the  filter, which   was full of granular debris.  We then did an angiogram of the right lower   extremity showing the findings that I have described and also, the below-knee   popliteal is patent and there is a normal trifurcation and patent runoff in 3   vessels.  These do not appear to have any stenoses.  We pulled the sheath back   in the groin and used a StarClose in the left groin, but for some reason, the   StarClose although deployed in the usual fashion, did not control the   bleeding.  At this point in time, the blood pressure was 197 mmHg and we   treated that with hydralazine bringing it down to 172.  We held pressure and   we also reversed the heparin with 70 mg of protamine.  A dressing was applied   to the left groin.  He tolerated the procedure well and was taken to the   postprocedure unit.  Plan will be for him to continue his Plavix at home and   follow up with us.       ____________________________________     MD VILMA Fowler / DELANO    DD:  01/23/2018 13:48:50  DT:  01/23/2018 14:48:38    D#:  [id number]  Job#:  [id number]

## 2018-01-23 NOTE — DISCHARGE INSTRUCTIONS
ACTIVITY: Rest and take it easy for the first 24 hours.  A responsible adult is recommended to remain with you during that time.  It is normal to feel sleepy.  We encourage you to not do anything that requires balance, judgment or coordination.    MILD FLU-LIKE SYMPTOMS ARE NORMAL. YOU MAY EXPERIENCE GENERALIZED MUSCLE ACHES, THROAT IRRITATION, HEADACHE AND/OR SOME NAUSEA.    FOR 24 HOURS DO NOT:  Drive, operate machinery or run household appliances.  Drink beer or alcoholic beverages.   Make important decisions or sign legal documents.    SPECIAL INSTRUCTIONS: follow up with primary care physician as needed  If you experience chest pain, shortness of breath call 911 return to ER   Follow up with Dr dixon call with any questions [phone]    DIET: To avoid nausea, slowly advance diet as tolerated, avoiding spicy or greasy foods for the first day.  Add more substantial food to your diet according to your physician's instructions.  Babies can be fed formula or breast milk as soon as they are hungry.  INCREASE FLUIDS AND FIBER TO AVOID CONSTIPATION.    SURGICAL DRESSING/BATHING: keep dressing clean dry intact for 24 hours, you may  Remove dressing after 24 hours.    FOLLOW-UP APPOINTMENT:  A follow-up appointment should be arranged with your doctor in [phone]; call to schedule.    You should CALL YOUR PHYSICIAN if you develop:  Fever greater than 101 degrees F.  Pain not relieved by medication, or persistent nausea or vomiting.  Excessive bleeding (blood soaking through dressing) or unexpected drainage from the wound.  Extreme redness or swelling around the incision site, drainage of pus or foul smelling drainage.  Inability to urinate or empty your bladder within 8 hours.  Problems with breathing or chest pain.    You should call 911 if you develop problems with breathing or chest pain.  If you are unable to contact your doctor or surgical center, you should go to the nearest emergency room or urgent care center.   Physician's telephone #: [phone]    If any questions arise, call your doctor.  If your doctor is not available, please feel free to call the Surgical Center at [phone].  The Center is open Monday through Friday from 7AM to 7PM.  You can also call the HEALTH HOTLINE open 24 hours/day, 7 days/week and speak to a nurse at [phone], or toll free at [phone].    A registered nurse may call you a few days after your surgery to see how you are doing after your procedure.    MEDICATIONS: Resume taking daily medication.  Take prescribed pain medication with food.  If no medication is prescribed, you may take non-aspirin pain medication if needed.  PAIN MEDICATION CAN BE VERY CONSTIPATING.  Take a stool softener or laxative such as senokot, pericolace, or milk of magnesia if needed.    If your physician has prescribed pain medication that includes Acetaminophen (Tylenol), do not take additional Acetaminophen (Tylenol) while taking the prescribed medication.    Depression / Suicide Risk    As you are discharged from this [facility] facility, it is important to learn how to keep safe from harming yourself.    Recognize the warning signs:  · Abrupt changes in personality, positive or negative- including increase in energy   · Giving away possessions  · Change in eating patterns- significant weight changes-  positive or negative  · Change in sleeping patterns- unable to sleep or sleeping all the time   · Unwillingness or inability to communicate  · Depression  · Unusual sadness, discouragement and loneliness  · Talk of wanting to die  · Neglect of personal appearance   · Rebelliousness- reckless behavior  · Withdrawal from people/activities they love  · Confusion- inability to concentrate     If you or a loved one observes any of these behaviors or has concerns about self-harm, here's what you can do:  · Talk about it- your feelings and reasons for harming yourself  · Remove any means that you might use to  "hurt yourself (examples: pills, rope, extension cords, firearm)  · Get professional help from the community (Mental Health, Substance Abuse, psychological counseling)  · Do not be alone:Call your Safe Contact- someone whom you trust who will be there for you.  · Call your local CRISIS HOTLINE 164-2783 or 961-962-1636  · Call your local Children's Mobile Crisis Response Team [place] (941) 111-6741 or www.[employer]  · Call the toll free National Suicide Prevention Hotlines   · National Suicide Prevention Lifeline 875-364-JUFU (5781)  [place] [facility] Connect Line Network 800-SUICIDE (636-3657)  Post Angiogram Groin Care Instructions     INSTRUCTIONS  2. Examine (look and feel) the site of your incision site TODAY so you can recognize changes that should be called to your doctor (see below).  3. Avoid straining either by lifting or pulling objects for 4-5 days. Avoid lifting over 5 pounds.   4. For at least 72 hours, if you should sneeze or cough, please hold pressure over your groin area.  5. If you should begin to have oozing from the catheterization site, please hold firm pressure and call your doctor's office immediately.  6. If profuse bleeding occurs from the catheterization site, hold firm pressure and call \"[phone]\" immediately for assistance.  7. Remove bandage after 24 hours.     ACTIVITY  2. Limit activity as instructed by your doctor.  3. No driving or very limited driving with frequent stops for one week.   4. If you must take a long car ride, stop every hour and walk around the car.   5. Warm showers or baths are permitted after the bandage is removed. Avoid hot showers, baths, hot tubs, and swimming for one week.    PLEASE CALL YOUR DOCTOR IF:  1. Temperature elevation occurs.  2. Catheterization site becomes reddened or begins to drain.   3. Bruising appears to be new or not resolving. The bruise may move down your leg. This is normal.  4. The small round lump in the groin increases in size.  5. Any leg " numbness, aching, or discomfort (immediately).  6. Increasing discomfort in the leg at the insertion site.  7. Chest pains, even if relieved by Nitroglycerin.    MISCELLANEOUS INSTRUCTIONS  1. Bruising may occur as a result of heart catheterization. Some of the discoloration may travel down the leg, going from blue to green in color.  2. A small round lump under the catheterization site will remain for up to six weeks.  3. If any questions arise call your physician's office. You can also call the [employer] HOTLINE open 24 hours/day, 7 days/week and speak to a nurse at [phone], or toll free at [phone].   4. You should call [phone] if you develop problems with breathing or chest pain.    FOR PROBLEMS CALL DR: *** AT: ***    I acknowledge receipt and understanding of these Home Care instructions.  ·

## 2018-01-23 NOTE — OR NURSING
1253 Patient arrived from IR s/p abdominal aortogram with Jetstream and angioplasty to right LE, left groin dressing clean dry intact, patient denies pain, not in distress, blood pressure high see flowsheets will medicate per orders, family at the bed side, informed patient plan of care agreeable, call light within reach will monitor.  1335 patient tolerating oral fluids and snacks.  1522 Dr carney at the bed side talking to patient, ok to sit patient up at 1730 and discharge at 1700 per Dr carney  1600 patient wide awake no c/o pain, vss.  1730 patient sitting up, vss,   1700 discharge instructions given to patient, patient and family verbalize understanding of the orders, iv discontinued tip intact, currently vss, no c/ cp, left groin dressing clean dry intact, vss, patient fully awake, criteria met to dc patient home.  1710 patient escorted via w/c with all his personal belongings.

## 2018-01-23 NOTE — PROGRESS NOTES
IR Procedure Note:    Site Marked and Confirmed with MD, patient and RN pre procedure. Dr. Cason performed an abdominal aortogram with Jetstream and angioplasty to right LE.  The pt tolerated the procedure well; ETCo2 baseline 25, with consistent waveform during the procedure.  Starclose, tegaderm and gauze applied to left groin, CDI and soft; pressure held x 15 minutes.  Pt alert and verbally appropriate post procedure, vital signs stable during procedure and transport, see flow sheet for vital signs.  Report given to ROCHELLE Park.  RN transported pt to PPU by ROCHELLE Pittman.

## 2018-01-26 ENCOUNTER — OFFICE VISIT (OUTPATIENT)
Dept: NEPHROLOGY | Facility: MEDICAL CENTER | Age: 78
End: 2018-01-26
Payer: MEDICARE

## 2018-01-26 VITALS
TEMPERATURE: 99.1 F | HEIGHT: 68 IN | SYSTOLIC BLOOD PRESSURE: 138 MMHG | HEART RATE: 70 BPM | RESPIRATION RATE: 14 BRPM | WEIGHT: 162 LBS | BODY MASS INDEX: 24.55 KG/M2 | OXYGEN SATURATION: 91 % | DIASTOLIC BLOOD PRESSURE: 72 MMHG

## 2018-01-26 DIAGNOSIS — M10.9 URIC ACID ARTHROPATHY: ICD-10-CM

## 2018-01-26 DIAGNOSIS — I10 ESSENTIAL HYPERTENSION: ICD-10-CM

## 2018-01-26 DIAGNOSIS — M25.542 ARTHRALGIA OF BOTH HANDS: ICD-10-CM

## 2018-01-26 DIAGNOSIS — N18.6 ESRD (END STAGE RENAL DISEASE) (HCC): ICD-10-CM

## 2018-01-26 DIAGNOSIS — M25.541 ARTHRALGIA OF BOTH HANDS: ICD-10-CM

## 2018-01-26 RX ORDER — OXYCODONE HYDROCHLORIDE AND ACETAMINOPHEN 5; 325 MG/1; MG/1
1 TABLET ORAL
Qty: 30 TAB | Refills: 0 | Status: SHIPPED | OUTPATIENT
Start: 2018-01-26 | End: 2018-02-25

## 2018-01-26 ASSESSMENT — ENCOUNTER SYMPTOMS
CHILLS: 0
PALPITATIONS: 0
FEVER: 0

## 2018-01-26 NOTE — PROGRESS NOTES
"Subjective:      Parmjit Castelan is a 77 y.o. male who presents with ESRD Follow-Up            HPI  77 year old with ESRD, gout, gouty arthropathy in hands. Has had recent PVD LE leg issues. Feeling pain with hands. Difficulty sleeping.    Review of Systems   Constitutional: Negative for chills and fever.   Cardiovascular: Negative for chest pain and palpitations.   Musculoskeletal: Positive for joint pain.          Objective:     /72   Pulse 70   Temp 37.3 °C (99.1 °F)   Resp 14   Ht 1.727 m (5' 8\")   Wt 73.5 kg (162 lb)   SpO2 91%   BMI 24.63 kg/m²      Physical Exam   Constitutional: He is oriented to person, place, and time. He appears well-developed and well-nourished.   Cardiovascular: Normal rate and regular rhythm.    Neurological: He is alert and oriented to person, place, and time.               Assessment/Plan:     1. ESRD (end stage renal disease) (CMS-[facility])  On HD TIW, discussed, doing well      2. Essential hypertension  BP controlled      3. Uric acid arthropathy  Still with severe pain in hands, difficulty sleeping, does not want to take too many medications. Recommended percocet nightly to help with sleep.    In prescribing controlled substances to this patient, I certify that I have obtained and reviewed the medical history of Parmjit Castelan. I have also made a good thu effort to obtain applicable records from other providers who have treated the patient and records did not demonstrate any increased risk of substance abuse that would prevent me from prescribing controlled substances.     I have conducted a physical exam and documented it. I have reviewed Mr. Castelan’s prescription history as maintained by the [place] Prescription Monitoring Program.     I have assessed the patient’s risk for abuse, dependency, and addiction using the validated Opioid Risk Tool available at https://www.mdcalc.com/fdrjqc-yxwk-jffl-ort-narcotic-abuse.     Given the above, I believe the benefits of " controlled substance therapy outweigh the risks. The reasons for prescribing controlled substances include non-narcotic, oral analgesic alternatives are contraindicated. Accordingly, I have discussed the risk and benefits, treatment plan, and alternative therapies with the patient.

## 2018-01-30 DIAGNOSIS — J44.9 CHRONIC OBSTRUCTIVE PULMONARY DISEASE, UNSPECIFIED COPD TYPE (HCC): ICD-10-CM

## 2018-01-30 RX ORDER — BUDESONIDE AND FORMOTEROL FUMARATE DIHYDRATE 160; 4.5 UG/1; UG/1
2 AEROSOL RESPIRATORY (INHALATION) 2 TIMES DAILY
Qty: 2 INHALER | Refills: 0 | Status: SHIPPED | OUTPATIENT
Start: 2018-01-30 | End: 2018-03-19

## 2018-01-30 RX ORDER — DICYCLOMINE HYDROCHLORIDE 10 MG/1
CAPSULE ORAL
Qty: 90 CAP | Refills: 11 | Status: SHIPPED | OUTPATIENT
Start: 2018-01-30 | End: 2018-03-19

## 2018-01-30 RX ORDER — ALLOPURINOL 100 MG/1
TABLET ORAL
Qty: 90 TAB | Refills: 3 | Status: SHIPPED | OUTPATIENT
Start: 2018-01-30 | End: 2018-03-19

## 2018-01-30 NOTE — TELEPHONE ENCOUNTER
Have we ever prescribed this med? Yes.  If yes, what date? 6/3/2016    Last OV: 3/21/2017    Next OV: 3/13/2018    DX: COPD    Medications: Symbicort samples

## 2018-02-06 ENCOUNTER — OFFICE VISIT (OUTPATIENT)
Dept: CARDIOLOGY | Facility: MEDICAL CENTER | Age: 78
End: 2018-02-06
Payer: MEDICARE

## 2018-02-06 VITALS
SYSTOLIC BLOOD PRESSURE: 130 MMHG | HEART RATE: 70 BPM | WEIGHT: 156 LBS | HEIGHT: 68 IN | BODY MASS INDEX: 23.64 KG/M2 | DIASTOLIC BLOOD PRESSURE: 80 MMHG

## 2018-02-06 DIAGNOSIS — E78.5 DYSLIPIDEMIA: ICD-10-CM

## 2018-02-06 DIAGNOSIS — N18.6 END STAGE RENAL DISEASE (HCC): ICD-10-CM

## 2018-02-06 DIAGNOSIS — I10 ESSENTIAL HYPERTENSION: ICD-10-CM

## 2018-02-06 DIAGNOSIS — I35.0 AORTIC STENOSIS, MODERATE: ICD-10-CM

## 2018-02-06 DIAGNOSIS — R93.1 ELEVATED CORONARY ARTERY CALCIUM SCORE: ICD-10-CM

## 2018-02-06 PROCEDURE — 99214 OFFICE O/P EST MOD 30 MIN: CPT | Performed by: INTERNAL MEDICINE

## 2018-02-06 NOTE — PROGRESS NOTES
"Subjective:   Parmjit Castelan is a 77 y.o. male who presents today followup of his hypertension. He also was noted to have coronary calcification on his cardiac CT in 2010. He underwent a myocardial perfusion scan December 2014 which was unremarkable. He does have end-stage renal disease and is on chronic hemodialysis.    Since his last visit, he has had percutaneous intervention to the right lower extremity by vascular surgery. This has improved his claudication significantly though he is now limited a bit by his left lower extremity. He can walk about a half a mile before he is having difficulty.    He has had no chest discomfort, dyspnea, edema, palpitations or lightheadedness.    Past Medical History:   Diagnosis Date   • Anesthesia     \"needs more sedation\" (can sometimes hear MD during procedure)    • Basal cell carcinoma of left cheek 3/26/2015   • BPH 7/14/2009   • Bronchitis 1/1/13   • CAD (coronary artery disease) 2/20/2014   • CATARACT     sanjuanita surgery complete   • CKD (chronic kidney disease) stage 4, GFR 15-29 ml/min (CMS-[facility]) 1/15/2010    end stage renal disease   • COPD (chronic obstructive pulmonary disease) (CMS-HCC)    • COPD (chronic obstructive pulmonary disease) (CMS-HCC) 8/2/2011   • Detached retina    • Dialysis     m,w,f [facility]   • EMPHYSEMA    • Gout    • Heart burn     occas   • High cholesterol    • HTN (hypertension) 1/15/2010   • Indigestion     occas   • Kidney cyst    • Leg pain, bilateral 8/10/2015   • On supplemental oxygen therapy    • Pneumonia    • Proteinuria    • Sleep apnea     O2 PER CANULA  AT NIGHT 2l/m   • Snoring      Past Surgical History:   Procedure Laterality Date   • ANGIOPLASTY BALLOON  7/23/2010    Performed by ALESHIA MOSCOSO at SURGERY [facility]   • ANGIOPLASTY BALLOON  9/17/2010    Performed by ALESHIA MOSCOSO at SURGERY [facility]   • AV FISTULA CREATION  2/12/2010    Performed by ALESHIA MOSCOSO at SURGERY [facility]   • AV FISTULA " REVISION  2/19/2010    Performed by WILLIE HATCH at SURGERY [facility]   • AV FISTULA THROMBOLYSIS  7/2/2013    Performed by David Cason M.D. at SURGERY [facility]   • AV FISTULOGRAM  7/23/2010    Performed by DAVID CASON at SURGERY [facility]   • AV FISTULOGRAM  9/17/2010    Performed by DAVID CASON at SURGERY [facility]   • CATARACT PHACO WITH IOL  4/8/08    Performed by STACEY PHILLIPS at SURGERY SAME DAY [facility]   • COLONOSCOPY - ENDO  8/15/2016    Procedure: COLONOSCOPY - ENDO;  Surgeon: Mane Whatley M.D.;  Location: ENDOSCOPY [facility];  Service:    • GASTROSCOPY WITH BIOPSY  8/13/2016    Procedure: GASTROSCOPY WITH BIOPSY;  Surgeon: Jorge Leavitt M.D.;  Location: ENDOSCOPY [facility];  Service:    • RECOVERY  8/12/2011    Performed by SURGERY, IR-RECOVERY at SURGERY SAME DAY [facility]   • RECOVERY  7/23/2012    Performed by SURGERY, IR-RECOVERY at SURGERY [facility]   • RECOVERY  1/29/2013    Performed by Ir-Recovery Surgery at SURGERY [facility]   • RECOVERY  7/2/2013    Performed by Ir-Recovery Surgery at SURGERY [facility]   • RECOVERY  12/17/2013    Performed by Ir-Recovery Surgery at SURGERY [facility]   • RECOVERY  3/24/2014    Performed by Ir-Recovery Surgery at SURGERY [facility]   • RECOVERY  7/29/2014    Performed by Ir-Recovery Surgery at SURGERY [facility]   • RECOVERY  3/24/2015    Performed by Recovery[facility] Surgery at SURGERY PRE-POST PROC UNIT [facility]   • RECOVERY  12/23/2015    Procedure: VASCULAR CASE-CASON-LEFT ARM FISTULOGRAM WITH ANGIOPLASTY;  Surgeon: [facility] Surgery;  Location: SURGERY PRE-POST PROC UNIT [facility];  Service:    • SCLERAL BUCKLING  1/18/2011    Performed by NAHUM GE at SURGERY SAME DAY [facility]   • VITRECTOMY POSTERIOR  1/18/2011    Performed by NAHUM GE at SURGERY SAME DAY [facility]   • VITRECTOMY POSTERIOR  10/11/2011     Performed by NAHUM GE at SURGERY [facility]     Family History   Problem Relation Age of Onset   • Stroke Mother    • Hypertension Mother    • Lung Disease Father      Emphysema, resp failure   • Genitourinary () Maternal Aunt      hematuria   • Hypertension Brother      History   Smoking Status   • Former Smoker   • Packs/day: 1.00   • Years: 40.00   • Types: Cigarettes   • Quit date: 1/1/2009   Smokeless Tobacco   • Never Used     Comment: 1 pk a day for 35 yrs, QUIT JAN 1 2010     No Known Allergies  Outpatient Encounter Prescriptions as of 2/6/2018   Medication Sig Dispense Refill   • allopurinol (ZYLOPRIM) 100 MG Tab TAKE ONE TABLET BY MOUTH TWICE A DAY (GENERIC FOR ZYLOPRIM) 90 Tab 3   • budesonide-formoterol (SYMBICORT) 160-4.5 MCG/ACT Aerosol Inhale 2 Puffs by mouth 2 Times a Day. Inhale 2 puffs by mouth twice daily. Rinse mouth after each use. 2 Inhaler 0   • doxazosin (CARDURA) 4 MG Tab TAKE ONE TABLET BY MOUTH DAILY 90 Tab 1   • tizanidine (ZANAFLEX) 2 MG tablet TAKE ONE TO TWO TABLETS BY MOUTH EVERY NIGHT AT BEDTIME FOR NECK MUSCLE SPASM *ZANAFLEX* 180 Tab 0   • famotidine (PEPCID) 20 MG Tab Take 20 mg by mouth every day.     • clopidogrel (PLAVIX) 75 MG Tab Take 75 mg by mouth every day.     • clonidine (CATAPRESS) 0.2 MG Tab Take 0.2 mg by mouth 2 times a day.     • albuterol (PROVENTIL) 2.5mg/3ml Nebu Soln solution for nebulization 3 mL by Nebulization route every four hours as needed for Shortness of Breath. 30 Bullet 2   • fluticasone (FLONASE) 50 MCG/ACT nasal spray PLACE ONE TO TWO SPRAYS IN EACH NOSTRIL EVERY DAY 1 Bottle 5   • spironolactone (ALDACTONE) 25 MG Tab Take 1 Tab by mouth every day. 30 Tab 11   • lisinopril (PRINIVIL, ZESTRIL) 40 MG tablet Take 1 Tab by mouth every day. 90 Tab 3   • furosemide (LASIX) 80 MG Tab Take 1 Tab by mouth 2 times a day. 180 Tab 3   • labetalol (NORMODYNE) 300 MG Tab Take 2 Tabs by mouth 2 times a day. 120 Tab 11   • pravastatin  "(PRAVACHOL) 20 MG Tab TAKE ONE TABLET BY MOUTH DAILY 90 Tab 3   • oxycodone-acetaminophen (PERCOCET) 5-325 MG Tab Take 1-2 Tabs by mouth every 6 hours as needed for Severe Pain. 20 Tab 0   • trazodone (DESYREL) 150 MG Tab TAKE TWO TABLETS BY MOUTH EVERY NIGHT AT BEDTIME (GENERIC FOR DESYREL) 180 Tab 4   • B Complex-C-Folic Acid (DIALYVITE TABLET) Tab TAKE ONE TABLET BY MOUTH DAILY 90 Tab 4   • Calcium Acetate, Phos Binder, 667 MG Cap TAKE TWO CAPSULES BY MOUTH THREE TIMES A DAY WITH A MEAL 180 Cap 11   • epoetin hilario (EPOGEN,PROCRIT) 2000 UNIT/ML Solution Inject 1 mL as instructed every Monday, Wednesday, and Friday. 1 mL 0   • dicyclomine (BENTYL) 10 MG Cap TAKE TWO CAPSULES BY MOUTH THREE TIMES A DAY AS NEEDED FOR IRRITABLE BOWEL 90 Cap 11   • oxycodone-acetaminophen (PERCOCET) 5-325 MG Tab Take 1 Tab by mouth every bedtime for 30 days. 30 Tab 0   • Diclofenac Sodium 1 % Gel APPLY 2 GRAMS TO SORE JOINTS TWO TIMES A DAY AS DIRECTED, AS NEEDED 100 g 2   • ondansetron (ZOFRAN ODT) 4 MG TABLET DISPERSIBLE DISSOLVE 1 TABLET BY MOUTH EVERY 6 HOURS AS NEEDED FOR NAUSEA 30 Tab 4   • amlodipine (NORVASC) 10 MG Tab TAKE ONE TABLET BY MOUTH DAILY 90 Tab 4     No facility-administered encounter medications on file as of 2/6/2018.      ROS       Objective:   /80   Pulse 70   Ht 1.727 m (5' 8\")   Wt 70.8 kg (156 lb)   BMI 23.72 kg/m²     Physical Exam   Neck: No JVD present.   Cardiovascular: Normal rate and regular rhythm.  Exam reveals no gallop.    Murmur (grade 3/6 systolic at the base and grade 2-3/6 systolic at the apex.) heard.  Pulmonary/Chest: Effort normal. He has no rales.   Abdominal: Soft. There is no tenderness.   Musculoskeletal: He exhibits edema (trace to 1+ pretibial).     Lab Results   Component Value Date/Time    CHOLSTRLTOT 90 (L) 06/22/2017 07:55 AM    LDL 26 06/22/2017 07:55 AM    HDL 57 06/22/2017 07:55 AM    TRIGLYCERIDE 37 06/22/2017 07:55 AM       Lab Results   Component Value Date/Time    " SODIUM 140 01/23/2018 08:15 AM    POTASSIUM 5.3 01/23/2018 08:15 AM    CHLORIDE 97 01/23/2018 08:15 AM    CO2 32 01/23/2018 08:15 AM    GLUCOSE 94 01/23/2018 08:15 AM    BUN 43 (H) 01/23/2018 08:15 AM    CREATININE 7.13 (HH) 01/23/2018 08:15 AM    CREATININE 2.1 (H) 05/06/2009 10:08 AM     Lab Results   Component Value Date/Time    ALKPHOSPHAT 103 (H) 06/22/2017 07:55 AM    ASTSGOT 23 06/22/2017 07:55 AM    ALTSGPT 18 06/22/2017 07:55 AM    TBILIRUBIN 0.8 06/22/2017 07:55 AM      Echocardiogram:  CONCLUSIONS  Normal left ventricular chamber size. Mild concentric left ventricular   hypertrophy. Normal left ventricular systolic function. Left   ventricular ejection fraction is visually estimated to be 60%. Normal   regional wall motion.  Tricuspid aortic valve. Calcified aortic valve leaflets. Moderate   aortic stenosis. Aortic valve area calculated from the continuity   equation is 1.4 cm². Vmax is 3.14  m/s. Transvalvular gradients are -   Peak: 40 mmHg, Mean: 23 mmHg. Dimensionless index is 0.35. No aortic   insufficiency.  Moderate pericardial effusion without evidence of hemodynamic   compromise.  Compared to the images of the study done 4/11/2017- there has been a   slight increase in the size of the pericardial effusion.     EDVIN CASTELAN  Exam Date:         05/29/2017    Assessment:     1. Elevated coronary artery calcium score     2. Essential hypertension     3. End stage renal disease (HCC)     4. Dyslipidemia     5. Aortic stenosis, moderate           Medical Decision Making:  Today's Assessment / Status / Plan:     Mr. Castelan has had significant improvement in his edema and hypertension. His claudication has significantly improved since his percutaneous intervention. His lipid status is under excellent control. He does have moderate aortic stenosis but is asymptomatic from this. He also has a moderate size pericardial effusion which is probably related to his renal failure. He will follow-up in about 6  months with a repeat echocardiogram.

## 2018-02-16 RX ORDER — AZITHROMYCIN 250 MG/1
TABLET, FILM COATED ORAL
Qty: 6 TAB | Refills: 0 | Status: SHIPPED | OUTPATIENT
Start: 2018-02-16 | End: 2018-03-19

## 2018-03-01 ENCOUNTER — TELEPHONE (OUTPATIENT)
Dept: NEPHROLOGY | Facility: MEDICAL CENTER | Age: 78
End: 2018-03-01

## 2018-03-02 ENCOUNTER — APPOINTMENT (OUTPATIENT)
Dept: NEPHROLOGY | Facility: MEDICAL CENTER | Age: 78
End: 2018-03-02
Payer: MEDICARE

## 2018-03-09 ENCOUNTER — OFFICE VISIT (OUTPATIENT)
Dept: NEPHROLOGY | Facility: MEDICAL CENTER | Age: 78
End: 2018-03-09
Payer: MEDICARE

## 2018-03-09 VITALS
SYSTOLIC BLOOD PRESSURE: 160 MMHG | HEIGHT: 68 IN | TEMPERATURE: 98.2 F | OXYGEN SATURATION: 95 % | HEART RATE: 78 BPM | DIASTOLIC BLOOD PRESSURE: 64 MMHG | WEIGHT: 160 LBS | RESPIRATION RATE: 14 BRPM | BODY MASS INDEX: 24.25 KG/M2

## 2018-03-09 DIAGNOSIS — M79.605 LEG PAIN, BILATERAL: ICD-10-CM

## 2018-03-09 DIAGNOSIS — Z99.2 ESRD (END STAGE RENAL DISEASE) ON DIALYSIS (HCC): ICD-10-CM

## 2018-03-09 DIAGNOSIS — N18.6 ESRD (END STAGE RENAL DISEASE) ON DIALYSIS (HCC): ICD-10-CM

## 2018-03-09 DIAGNOSIS — M79.604 LEG PAIN, BILATERAL: ICD-10-CM

## 2018-03-09 RX ORDER — OXYCODONE HYDROCHLORIDE AND ACETAMINOPHEN 5; 325 MG/1; MG/1
1 TABLET ORAL EVERY 8 HOURS PRN
Qty: 30 TAB | Refills: 0 | Status: ON HOLD | OUTPATIENT
Start: 2018-03-09 | End: 2018-03-20

## 2018-03-09 NOTE — PROGRESS NOTES
"Subjective:      Parmjit Castelan is a 77 y.o. male who presents with Follow-Up            HPI  77 year old on dialysis, continues to have leg pain, has had angioplasty of right, though left still with pain. Taking percocet, no problems. Appears to be doing well.    Review of Systems   All other systems reviewed and are negative.         Objective:     /64   Pulse 78   Temp 36.8 °C (98.2 °F)   Resp 14   Ht 1.727 m (5' 8\")   Wt 72.6 kg (160 lb)   SpO2 95%   BMI 24.33 kg/m²      Physical Exam   Constitutional: He is oriented to person, place, and time. He appears well-developed and well-nourished.   Cardiovascular: Normal rate and regular rhythm.    Pulmonary/Chest: Effort normal and breath sounds normal.   Neurological: He is alert and oriented to person, place, and time.               Assessment/Plan:     1. Leg pain, bilateral  Percocet indicated.    In prescribing controlled substances to this patient, I certify that I have obtained and reviewed the medical history of Parmjit Castelan. I have also made a good thu effort to obtain applicable records from other providers who have treated the patient and records did not demonstrate any increased risk of substance abuse that would prevent me from prescribing controlled substances.     I have conducted a physical exam and documented it. I have reviewed Mr. Castelan’s prescription history as maintained by the [place] Prescription Monitoring Program.     I have assessed the patient’s risk for abuse, dependency, and addiction using the validated Opioid Risk Tool available at https://www.mdcalc.com/wlpyui-mdkk-bbgp-ort-narcotic-abuse.     Given the above, I believe the benefits of controlled substance therapy outweigh the risks. The reasons for prescribing controlled substances include non-narcotic, oral analgesic alternatives are contraindicated. Accordingly, I have discussed the risk and benefits, treatment plan, and alternative therapies with the patient. "       - oxyCODONE-acetaminophen (PERCOCET) 5-325 MG Tab; Take 1 Tab by mouth every 8 hours as needed for up to 10 days.  Dispense: 30 Tab; Refill: 0  - CONSENT FOR OPIATE PRESCRIPTION    2. ESRD (end stage renal disease) on dialysis (CMS-[facility])  Dialysis reviewed, continue current tx

## 2018-03-13 ENCOUNTER — APPOINTMENT (OUTPATIENT)
Dept: PULMONOLOGY | Facility: HOSPICE | Age: 78
End: 2018-03-13
Payer: MEDICARE

## 2018-03-19 ENCOUNTER — APPOINTMENT (OUTPATIENT)
Dept: RADIOLOGY | Facility: MEDICAL CENTER | Age: 78
DRG: 070 | End: 2018-03-19
Attending: EMERGENCY MEDICINE
Payer: MEDICARE

## 2018-03-19 ENCOUNTER — RESOLUTE PROFESSIONAL BILLING HOSPITAL PROF FEE (OUTPATIENT)
Dept: HOSPITALIST | Facility: MEDICAL CENTER | Age: 78
End: 2018-03-19
Payer: MEDICARE

## 2018-03-19 ENCOUNTER — HOSPITAL ENCOUNTER (INPATIENT)
Facility: MEDICAL CENTER | Age: 78
LOS: 3 days | DRG: 070 | End: 2018-03-23
Attending: EMERGENCY MEDICINE | Admitting: STUDENT IN AN ORGANIZED HEALTH CARE EDUCATION/TRAINING PROGRAM
Payer: MEDICARE

## 2018-03-19 DIAGNOSIS — R41.82 ALTERED MENTAL STATUS, UNSPECIFIED ALTERED MENTAL STATUS TYPE: ICD-10-CM

## 2018-03-19 PROBLEM — G93.40 ENCEPHALOPATHY: Status: ACTIVE | Noted: 2018-03-19

## 2018-03-19 LAB
ALBUMIN SERPL BCP-MCNC: 3.6 G/DL (ref 3.2–4.9)
ALBUMIN/GLOB SERPL: 1.2 G/DL
ALP SERPL-CCNC: 85 U/L (ref 30–99)
ALT SERPL-CCNC: 22 U/L (ref 2–50)
ANION GAP SERPL CALC-SCNC: 15 MMOL/L (ref 0–11.9)
APTT PPP: 29.8 SEC (ref 24.7–36)
AST SERPL-CCNC: 23 U/L (ref 12–45)
BASOPHILS # BLD AUTO: 0.5 % (ref 0–1.8)
BASOPHILS # BLD: 0.02 K/UL (ref 0–0.12)
BILIRUB SERPL-MCNC: 0.9 MG/DL (ref 0.1–1.5)
BUN SERPL-MCNC: 68 MG/DL (ref 8–22)
CALCIUM SERPL-MCNC: 10 MG/DL (ref 8.4–10.2)
CHLORIDE SERPL-SCNC: 95 MMOL/L (ref 96–112)
CO2 SERPL-SCNC: 26 MMOL/L (ref 20–33)
CREAT SERPL-MCNC: 5.77 MG/DL (ref 0.5–1.4)
EKG IMPRESSION: NORMAL
EOSINOPHIL # BLD AUTO: 0.1 K/UL (ref 0–0.51)
EOSINOPHIL NFR BLD: 2.4 % (ref 0–6.9)
ERYTHROCYTE [DISTWIDTH] IN BLOOD BY AUTOMATED COUNT: 51.3 FL (ref 35.9–50)
GLOBULIN SER CALC-MCNC: 3 G/DL (ref 1.9–3.5)
GLUCOSE SERPL-MCNC: 96 MG/DL (ref 65–99)
HCT VFR BLD AUTO: 24.7 % (ref 42–52)
HGB BLD-MCNC: 8.3 G/DL (ref 14–18)
IMM GRANULOCYTES # BLD AUTO: 0.01 K/UL (ref 0–0.11)
IMM GRANULOCYTES NFR BLD AUTO: 0.2 % (ref 0–0.9)
INR PPP: 1.15 (ref 0.87–1.13)
LACTATE BLD-SCNC: 1.27 MMOL/L (ref 0.5–2)
LACTATE BLD-SCNC: 2.1 MMOL/L (ref 0.5–2)
LYMPHOCYTES # BLD AUTO: 0.48 K/UL (ref 1–4.8)
LYMPHOCYTES NFR BLD: 11.5 % (ref 22–41)
MCH RBC QN AUTO: 32.8 PG (ref 27–33)
MCHC RBC AUTO-ENTMCNC: 33.6 G/DL (ref 33.7–35.3)
MCV RBC AUTO: 97.6 FL (ref 81.4–97.8)
MONOCYTES # BLD AUTO: 0.31 K/UL (ref 0–0.85)
MONOCYTES NFR BLD AUTO: 7.4 % (ref 0–13.4)
NEUTROPHILS # BLD AUTO: 3.25 K/UL (ref 1.82–7.42)
NEUTROPHILS NFR BLD: 78 % (ref 44–72)
NRBC # BLD AUTO: 0 K/UL
NRBC BLD-RTO: 0 /100 WBC
PLATELET # BLD AUTO: 222 K/UL (ref 164–446)
PMV BLD AUTO: 9.6 FL (ref 9–12.9)
POTASSIUM SERPL-SCNC: 4.2 MMOL/L (ref 3.6–5.5)
PROT SERPL-MCNC: 6.6 G/DL (ref 6–8.2)
PROTHROMBIN TIME: 14.6 SEC (ref 12–14.6)
RBC # BLD AUTO: 2.53 M/UL (ref 4.7–6.1)
SODIUM SERPL-SCNC: 136 MMOL/L (ref 135–145)
TROPONIN I SERPL-MCNC: 0.09 NG/ML (ref 0–0.04)
WBC # BLD AUTO: 4.2 K/UL (ref 4.8–10.8)

## 2018-03-19 PROCEDURE — G0378 HOSPITAL OBSERVATION PER HR: HCPCS

## 2018-03-19 PROCEDURE — 87040 BLOOD CULTURE FOR BACTERIA: CPT | Mod: 91

## 2018-03-19 PROCEDURE — 36415 COLL VENOUS BLD VENIPUNCTURE: CPT

## 2018-03-19 PROCEDURE — 96374 THER/PROPH/DIAG INJ IV PUSH: CPT

## 2018-03-19 PROCEDURE — 84443 ASSAY THYROID STIM HORMONE: CPT

## 2018-03-19 PROCEDURE — 85025 COMPLETE CBC W/AUTO DIFF WBC: CPT

## 2018-03-19 PROCEDURE — 71045 X-RAY EXAM CHEST 1 VIEW: CPT

## 2018-03-19 PROCEDURE — 87077 CULTURE AEROBIC IDENTIFY: CPT | Mod: 91

## 2018-03-19 PROCEDURE — 82728 ASSAY OF FERRITIN: CPT

## 2018-03-19 PROCEDURE — 70450 CT HEAD/BRAIN W/O DYE: CPT

## 2018-03-19 PROCEDURE — 80053 COMPREHEN METABOLIC PANEL: CPT

## 2018-03-19 PROCEDURE — 85610 PROTHROMBIN TIME: CPT

## 2018-03-19 PROCEDURE — 83540 ASSAY OF IRON: CPT

## 2018-03-19 PROCEDURE — A9270 NON-COVERED ITEM OR SERVICE: HCPCS | Performed by: STUDENT IN AN ORGANIZED HEALTH CARE EDUCATION/TRAINING PROGRAM

## 2018-03-19 PROCEDURE — 85730 THROMBOPLASTIN TIME PARTIAL: CPT

## 2018-03-19 PROCEDURE — 87086 URINE CULTURE/COLONY COUNT: CPT

## 2018-03-19 PROCEDURE — 83550 IRON BINDING TEST: CPT

## 2018-03-19 PROCEDURE — 99285 EMERGENCY DEPT VISIT HI MDM: CPT

## 2018-03-19 PROCEDURE — 99220 PR INITIAL OBSERVATION CARE,LEVL III: CPT | Performed by: STUDENT IN AN ORGANIZED HEALTH CARE EDUCATION/TRAINING PROGRAM

## 2018-03-19 PROCEDURE — 83605 ASSAY OF LACTIC ACID: CPT

## 2018-03-19 PROCEDURE — 96375 TX/PRO/DX INJ NEW DRUG ADDON: CPT

## 2018-03-19 PROCEDURE — 93005 ELECTROCARDIOGRAM TRACING: CPT | Performed by: EMERGENCY MEDICINE

## 2018-03-19 PROCEDURE — 700111 HCHG RX REV CODE 636 W/ 250 OVERRIDE (IP): Performed by: EMERGENCY MEDICINE

## 2018-03-19 PROCEDURE — 700101 HCHG RX REV CODE 250: Performed by: EMERGENCY MEDICINE

## 2018-03-19 PROCEDURE — 700102 HCHG RX REV CODE 250 W/ 637 OVERRIDE(OP): Performed by: STUDENT IN AN ORGANIZED HEALTH CARE EDUCATION/TRAINING PROGRAM

## 2018-03-19 PROCEDURE — 84484 ASSAY OF TROPONIN QUANT: CPT

## 2018-03-19 PROCEDURE — 87186 SC STD MICRODIL/AGAR DIL: CPT | Mod: 91

## 2018-03-19 RX ORDER — LABETALOL HYDROCHLORIDE 5 MG/ML
10 INJECTION, SOLUTION INTRAVENOUS EVERY 4 HOURS PRN
Status: DISCONTINUED | OUTPATIENT
Start: 2018-03-19 | End: 2018-03-23 | Stop reason: HOSPADM

## 2018-03-19 RX ORDER — PANTOPRAZOLE SODIUM 40 MG/10ML
40 INJECTION, POWDER, LYOPHILIZED, FOR SOLUTION INTRAVENOUS DAILY
Status: DISCONTINUED | OUTPATIENT
Start: 2018-03-20 | End: 2018-03-20

## 2018-03-19 RX ORDER — BISACODYL 10 MG
10 SUPPOSITORY, RECTAL RECTAL
Status: DISCONTINUED | OUTPATIENT
Start: 2018-03-19 | End: 2018-03-23 | Stop reason: HOSPADM

## 2018-03-19 RX ORDER — CLONIDINE HYDROCHLORIDE 0.1 MG/1
0.2 TABLET ORAL 2 TIMES DAILY
Status: DISCONTINUED | OUTPATIENT
Start: 2018-03-19 | End: 2018-03-23 | Stop reason: HOSPADM

## 2018-03-19 RX ORDER — ALLOPURINOL 100 MG/1
100 TABLET ORAL DAILY
COMMUNITY
End: 2018-06-12

## 2018-03-19 RX ORDER — AMLODIPINE BESYLATE 5 MG/1
10 TABLET ORAL
Status: DISCONTINUED | OUTPATIENT
Start: 2018-03-20 | End: 2018-03-23 | Stop reason: HOSPADM

## 2018-03-19 RX ORDER — BUDESONIDE AND FORMOTEROL FUMARATE DIHYDRATE 160; 4.5 UG/1; UG/1
2 AEROSOL RESPIRATORY (INHALATION) 2 TIMES DAILY
Status: DISCONTINUED | OUTPATIENT
Start: 2018-03-19 | End: 2018-03-23 | Stop reason: HOSPADM

## 2018-03-19 RX ORDER — LABETALOL HYDROCHLORIDE 5 MG/ML
20 INJECTION, SOLUTION INTRAVENOUS ONCE
Status: COMPLETED | OUTPATIENT
Start: 2018-03-19 | End: 2018-03-19

## 2018-03-19 RX ORDER — HALOPERIDOL 5 MG/ML
5 INJECTION INTRAMUSCULAR EVERY 4 HOURS PRN
Status: DISCONTINUED | OUTPATIENT
Start: 2018-03-19 | End: 2018-03-23 | Stop reason: HOSPADM

## 2018-03-19 RX ORDER — POLYETHYLENE GLYCOL 3350 17 G/17G
1 POWDER, FOR SOLUTION ORAL
Status: DISCONTINUED | OUTPATIENT
Start: 2018-03-19 | End: 2018-03-23 | Stop reason: HOSPADM

## 2018-03-19 RX ORDER — LABETALOL 100 MG/1
600 TABLET, FILM COATED ORAL 2 TIMES DAILY
Status: DISCONTINUED | OUTPATIENT
Start: 2018-03-19 | End: 2018-03-22

## 2018-03-19 RX ORDER — ONDANSETRON 2 MG/ML
4 INJECTION INTRAMUSCULAR; INTRAVENOUS EVERY 4 HOURS PRN
Status: DISCONTINUED | OUTPATIENT
Start: 2018-03-19 | End: 2018-03-23 | Stop reason: HOSPADM

## 2018-03-19 RX ORDER — LISINOPRIL 20 MG/1
40 TABLET ORAL DAILY
Status: DISCONTINUED | OUTPATIENT
Start: 2018-03-20 | End: 2018-03-23 | Stop reason: HOSPADM

## 2018-03-19 RX ORDER — IRBESARTAN 150 MG/1
300 TABLET ORAL NIGHTLY
Status: ON HOLD | COMMUNITY
End: 2018-03-20

## 2018-03-19 RX ORDER — TAMSULOSIN HYDROCHLORIDE 0.4 MG/1
0.4 CAPSULE ORAL EVERY EVENING
COMMUNITY
End: 2018-06-12

## 2018-03-19 RX ORDER — AMOXICILLIN 250 MG
2 CAPSULE ORAL 2 TIMES DAILY
Status: DISCONTINUED | OUTPATIENT
Start: 2018-03-19 | End: 2018-03-23 | Stop reason: HOSPADM

## 2018-03-19 RX ORDER — DOXAZOSIN 2 MG/1
4 TABLET ORAL
Status: DISCONTINUED | OUTPATIENT
Start: 2018-03-20 | End: 2018-03-23 | Stop reason: HOSPADM

## 2018-03-19 RX ORDER — LORAZEPAM 2 MG/ML
0.5 INJECTION INTRAMUSCULAR ONCE
Status: COMPLETED | OUTPATIENT
Start: 2018-03-19 | End: 2018-03-19

## 2018-03-19 RX ADMIN — BUDESONIDE AND FORMOTEROL FUMARATE DIHYDRATE 2 PUFF: 160; 4.5 AEROSOL RESPIRATORY (INHALATION) at 23:26

## 2018-03-19 RX ADMIN — STANDARDIZED SENNA CONCENTRATE AND DOCUSATE SODIUM 2 TABLET: 8.6; 5 TABLET, FILM COATED ORAL at 23:25

## 2018-03-19 RX ADMIN — CLONIDINE HYDROCHLORIDE 0.2 MG: 0.1 TABLET ORAL at 23:25

## 2018-03-19 RX ADMIN — LABETALOL HCL 600 MG: 100 TABLET, FILM COATED ORAL at 23:24

## 2018-03-19 RX ADMIN — LABETALOL HYDROCHLORIDE 20 MG: 5 INJECTION, SOLUTION INTRAVENOUS at 19:05

## 2018-03-19 RX ADMIN — LORAZEPAM 0.5 MG: 2 INJECTION INTRAMUSCULAR; INTRAVENOUS at 19:05

## 2018-03-19 ASSESSMENT — ENCOUNTER SYMPTOMS
TINGLING: 0
SPUTUM PRODUCTION: 0
DEPRESSION: 0
NAUSEA: 0
MYALGIAS: 0
HEARTBURN: 1
SEIZURES: 0
SHORTNESS OF BREATH: 0
VOMITING: 0
TREMORS: 0
CLAUDICATION: 0
FOCAL WEAKNESS: 0
CHILLS: 0
HEADACHES: 0
ABDOMINAL PAIN: 0
FEVER: 0
COUGH: 0
LOSS OF CONSCIOUSNESS: 0
DIZZINESS: 0
PALPITATIONS: 0
DIARRHEA: 0
DOUBLE VISION: 0
ORTHOPNEA: 0
HEMOPTYSIS: 0
BLURRED VISION: 0

## 2018-03-19 ASSESSMENT — PATIENT HEALTH QUESTIONNAIRE - PHQ9
SUM OF ALL RESPONSES TO PHQ9 QUESTIONS 1 AND 2: 0
1. LITTLE INTEREST OR PLEASURE IN DOING THINGS: NOT AT ALL
SUM OF ALL RESPONSES TO PHQ QUESTIONS 1-9: 0
2. FEELING DOWN, DEPRESSED, IRRITABLE, OR HOPELESS: NOT AT ALL

## 2018-03-19 ASSESSMENT — PAIN SCALES - GENERAL: PAINLEVEL_OUTOF10: 0

## 2018-03-19 ASSESSMENT — LIFESTYLE VARIABLES: DO YOU DRINK ALCOHOL: NO

## 2018-03-20 PROBLEM — G93.41 ACUTE METABOLIC ENCEPHALOPATHY: Status: ACTIVE | Noted: 2018-03-19

## 2018-03-20 PROBLEM — N19 UREMIA: Status: ACTIVE | Noted: 2018-03-20

## 2018-03-20 PROBLEM — R79.89 ELEVATED TROPONIN: Status: ACTIVE | Noted: 2018-03-20

## 2018-03-20 LAB
ABO GROUP BLD: NORMAL
ALBUMIN SERPL BCP-MCNC: 3 G/DL (ref 3.2–4.9)
ALBUMIN/GLOB SERPL: 1.1 G/DL
ALP SERPL-CCNC: 70 U/L (ref 30–99)
ALT SERPL-CCNC: 20 U/L (ref 2–50)
ANION GAP SERPL CALC-SCNC: 12 MMOL/L (ref 0–11.9)
APPEARANCE UR: ABNORMAL
AST SERPL-CCNC: 27 U/L (ref 12–45)
BACTERIA #/AREA URNS HPF: ABNORMAL /HPF
BARCODED ABORH UBTYP: 5100
BARCODED PRD CODE UBPRD: NORMAL
BARCODED UNIT NUM UBUNT: NORMAL
BASOPHILS # BLD AUTO: 0.5 % (ref 0–1.8)
BASOPHILS # BLD: 0.03 K/UL (ref 0–0.12)
BILIRUB SERPL-MCNC: 0.6 MG/DL (ref 0.1–1.5)
BILIRUB UR QL STRIP.AUTO: NEGATIVE
BLD GP AB SCN SERPL QL: NORMAL
BUN SERPL-MCNC: 104 MG/DL (ref 8–22)
CALCIUM SERPL-MCNC: 9.8 MG/DL (ref 8.4–10.2)
CHLORIDE SERPL-SCNC: 96 MMOL/L (ref 96–112)
CO2 SERPL-SCNC: 27 MMOL/L (ref 20–33)
COLOR UR: YELLOW
COMPONENT R 8504R: NORMAL
CREAT SERPL-MCNC: 7.1 MG/DL (ref 0.5–1.4)
EOSINOPHIL # BLD AUTO: 0.12 K/UL (ref 0–0.51)
EOSINOPHIL NFR BLD: 2.2 % (ref 0–6.9)
EPI CELLS #/AREA URNS HPF: ABNORMAL /HPF
ERYTHROCYTE [DISTWIDTH] IN BLOOD BY AUTOMATED COUNT: 55.5 FL (ref 35.9–50)
FERRITIN SERPL-MCNC: 216.2 NG/ML (ref 22–322)
GLOBULIN SER CALC-MCNC: 2.7 G/DL (ref 1.9–3.5)
GLUCOSE SERPL-MCNC: 110 MG/DL (ref 65–99)
GLUCOSE UR STRIP.AUTO-MCNC: 100 MG/DL
HCT VFR BLD AUTO: 20.5 % (ref 42–52)
HGB BLD-MCNC: 5.6 G/DL (ref 14–18)
HGB BLD-MCNC: 6.6 G/DL (ref 14–18)
HGB BLD-MCNC: 7 G/DL (ref 14–18)
HGB BLD-MCNC: 7.2 G/DL (ref 14–18)
HGB BLD-MCNC: 8 G/DL (ref 14–18)
IMM GRANULOCYTES # BLD AUTO: 0.02 K/UL (ref 0–0.11)
IMM GRANULOCYTES NFR BLD AUTO: 0.4 % (ref 0–0.9)
IRON SATN MFR SERPL: 58 % (ref 15–55)
IRON SERPL-MCNC: 168 UG/DL (ref 50–180)
KETONES UR STRIP.AUTO-MCNC: NEGATIVE MG/DL
LEUKOCYTE ESTERASE UR QL STRIP.AUTO: NEGATIVE
LYMPHOCYTES # BLD AUTO: 0.79 K/UL (ref 1–4.8)
LYMPHOCYTES NFR BLD: 14.2 % (ref 22–41)
MCH RBC QN AUTO: 33.3 PG (ref 27–33)
MCHC RBC AUTO-ENTMCNC: 32.2 G/DL (ref 33.7–35.3)
MCV RBC AUTO: 103.5 FL (ref 81.4–97.8)
MICRO URNS: ABNORMAL
MONOCYTES # BLD AUTO: 0.49 K/UL (ref 0–0.85)
MONOCYTES NFR BLD AUTO: 8.8 % (ref 0–13.4)
MUCOUS THREADS #/AREA URNS HPF: ABNORMAL /HPF
NEUTROPHILS # BLD AUTO: 4.13 K/UL (ref 1.82–7.42)
NEUTROPHILS NFR BLD: 73.9 % (ref 44–72)
NITRITE UR QL STRIP.AUTO: NEGATIVE
NRBC # BLD AUTO: 0 K/UL
NRBC BLD-RTO: 0 /100 WBC
PH UR STRIP.AUTO: 8.5 [PH]
PLATELET # BLD AUTO: 221 K/UL (ref 164–446)
PMV BLD AUTO: 10.1 FL (ref 9–12.9)
POTASSIUM SERPL-SCNC: 5.6 MMOL/L (ref 3.6–5.5)
PRODUCT TYPE UPROD: NORMAL
PROT SERPL-MCNC: 5.7 G/DL (ref 6–8.2)
PROT UR QL STRIP: >=300 MG/DL
RBC # BLD AUTO: 1.98 M/UL (ref 4.7–6.1)
RBC # URNS HPF: ABNORMAL /HPF
RBC UR QL AUTO: ABNORMAL
RH BLD: NORMAL
SODIUM SERPL-SCNC: 135 MMOL/L (ref 135–145)
SP GR UR STRIP.AUTO: 1.01
TIBC SERPL-MCNC: 290 UG/DL (ref 250–450)
TROPONIN I SERPL-MCNC: 0.1 NG/ML (ref 0–0.04)
TROPONIN I SERPL-MCNC: 0.12 NG/ML (ref 0–0.04)
TSH SERPL DL<=0.005 MIU/L-ACNC: 0.8 UIU/ML (ref 0.38–5.33)
UNIDENT CRYS URNS QL MICRO: ABNORMAL /HPF
UNIT STATUS USTAT: NORMAL
WBC # BLD AUTO: 5.6 K/UL (ref 4.8–10.8)
WBC #/AREA URNS HPF: ABNORMAL /HPF

## 2018-03-20 PROCEDURE — 90935 HEMODIALYSIS ONE EVALUATION: CPT

## 2018-03-20 PROCEDURE — 84484 ASSAY OF TROPONIN QUANT: CPT | Mod: 91

## 2018-03-20 PROCEDURE — 700111 HCHG RX REV CODE 636 W/ 250 OVERRIDE (IP): Performed by: HOSPITALIST

## 2018-03-20 PROCEDURE — 86900 BLOOD TYPING SEROLOGIC ABO: CPT

## 2018-03-20 PROCEDURE — 86923 COMPATIBILITY TEST ELECTRIC: CPT

## 2018-03-20 PROCEDURE — 700111 HCHG RX REV CODE 636 W/ 250 OVERRIDE (IP): Performed by: STUDENT IN AN ORGANIZED HEALTH CARE EDUCATION/TRAINING PROGRAM

## 2018-03-20 PROCEDURE — 81001 URINALYSIS AUTO W/SCOPE: CPT

## 2018-03-20 PROCEDURE — 86850 RBC ANTIBODY SCREEN: CPT

## 2018-03-20 PROCEDURE — 94760 N-INVAS EAR/PLS OXIMETRY 1: CPT

## 2018-03-20 PROCEDURE — 85025 COMPLETE CBC W/AUTO DIFF WBC: CPT

## 2018-03-20 PROCEDURE — 99291 CRITICAL CARE FIRST HOUR: CPT | Performed by: HOSPITALIST

## 2018-03-20 PROCEDURE — P9016 RBC LEUKOCYTES REDUCED: HCPCS

## 2018-03-20 PROCEDURE — 86901 BLOOD TYPING SEROLOGIC RH(D): CPT

## 2018-03-20 PROCEDURE — 80053 COMPREHEN METABOLIC PANEL: CPT

## 2018-03-20 PROCEDURE — 36430 TRANSFUSION BLD/BLD COMPNT: CPT

## 2018-03-20 PROCEDURE — 700102 HCHG RX REV CODE 250 W/ 637 OVERRIDE(OP): Performed by: HOSPITALIST

## 2018-03-20 PROCEDURE — 85018 HEMOGLOBIN: CPT | Mod: 91

## 2018-03-20 PROCEDURE — C9113 INJ PANTOPRAZOLE SODIUM, VIA: HCPCS | Performed by: STUDENT IN AN ORGANIZED HEALTH CARE EDUCATION/TRAINING PROGRAM

## 2018-03-20 PROCEDURE — A9270 NON-COVERED ITEM OR SERVICE: HCPCS | Performed by: HOSPITALIST

## 2018-03-20 PROCEDURE — 770020 HCHG ROOM/CARE - TELE (206)

## 2018-03-20 PROCEDURE — 700105 HCHG RX REV CODE 258: Performed by: STUDENT IN AN ORGANIZED HEALTH CARE EDUCATION/TRAINING PROGRAM

## 2018-03-20 RX ORDER — FUROSEMIDE 40 MG/1
40 TABLET ORAL 2 TIMES DAILY
Status: ON HOLD | COMMUNITY
End: 2019-01-25

## 2018-03-20 RX ORDER — OMEPRAZOLE 40 MG/1
80 CAPSULE, DELAYED RELEASE ORAL DAILY
COMMUNITY
End: 2018-06-12

## 2018-03-20 RX ORDER — ALPRAZOLAM 0.5 MG/1
0.5 TABLET ORAL 3 TIMES DAILY PRN
COMMUNITY
End: 2018-06-12

## 2018-03-20 RX ORDER — ROPINIROLE 0.25 MG/1
0.25 TABLET, FILM COATED ORAL
COMMUNITY
End: 2018-05-04

## 2018-03-20 RX ORDER — LISINOPRIL 40 MG/1
40 TABLET ORAL 2 TIMES DAILY
COMMUNITY
End: 2018-06-12

## 2018-03-20 RX ORDER — LABETALOL 300 MG/1
300 TABLET, FILM COATED ORAL 2 TIMES DAILY
COMMUNITY
End: 2018-06-12

## 2018-03-20 RX ORDER — OXYCODONE HYDROCHLORIDE AND ACETAMINOPHEN 5; 325 MG/1; MG/1
1-2 TABLET ORAL DAILY
COMMUNITY
End: 2018-06-12

## 2018-03-20 RX ORDER — ONDANSETRON 4 MG/1
4 TABLET, ORALLY DISINTEGRATING ORAL EVERY 6 HOURS PRN
COMMUNITY
End: 2018-06-12

## 2018-03-20 RX ORDER — LORAZEPAM 2 MG/ML
1 INJECTION INTRAMUSCULAR EVERY 4 HOURS PRN
Status: DISCONTINUED | OUTPATIENT
Start: 2018-03-20 | End: 2018-03-23 | Stop reason: HOSPADM

## 2018-03-20 RX ORDER — OXYCODONE HYDROCHLORIDE AND ACETAMINOPHEN 5; 325 MG/1; MG/1
1 TABLET ORAL EVERY 8 HOURS PRN
Status: DISCONTINUED | OUTPATIENT
Start: 2018-03-20 | End: 2018-03-22

## 2018-03-20 RX ORDER — MORPHINE SULFATE 4 MG/ML
1-4 INJECTION, SOLUTION INTRAMUSCULAR; INTRAVENOUS EVERY 4 HOURS PRN
Status: DISCONTINUED | OUTPATIENT
Start: 2018-03-20 | End: 2018-03-23 | Stop reason: HOSPADM

## 2018-03-20 RX ORDER — ATORVASTATIN CALCIUM 10 MG/1
10 TABLET, FILM COATED ORAL NIGHTLY
COMMUNITY
End: 2018-06-12

## 2018-03-20 RX ORDER — IRBESARTAN 300 MG/1
300 TABLET ORAL NIGHTLY
COMMUNITY
End: 2018-09-11

## 2018-03-20 RX ADMIN — OXYCODONE HYDROCHLORIDE AND ACETAMINOPHEN 1 TABLET: 5; 325 TABLET ORAL at 09:32

## 2018-03-20 RX ADMIN — HALOPERIDOL LACTATE 5 MG: 5 INJECTION, SOLUTION INTRAMUSCULAR at 18:38

## 2018-03-20 RX ADMIN — PANTOPRAZOLE SODIUM 8 MG/HR: 40 INJECTION, POWDER, FOR SOLUTION INTRAVENOUS at 09:04

## 2018-03-20 RX ADMIN — BUDESONIDE AND FORMOTEROL FUMARATE DIHYDRATE 2 PUFF: 160; 4.5 AEROSOL RESPIRATORY (INHALATION) at 09:08

## 2018-03-20 RX ADMIN — PANTOPRAZOLE SODIUM 8 MG/HR: 40 INJECTION, POWDER, FOR SOLUTION INTRAVENOUS at 18:04

## 2018-03-20 RX ADMIN — LORAZEPAM 1 MG: 2 INJECTION INTRAMUSCULAR; INTRAVENOUS at 20:32

## 2018-03-20 RX ADMIN — MORPHINE SULFATE 4 MG: 4 INJECTION INTRAVENOUS at 12:01

## 2018-03-20 ASSESSMENT — PAIN SCALES - GENERAL
PAINLEVEL_OUTOF10: 0
PAINLEVEL_OUTOF10: 0
PAINLEVEL_OUTOF10: 6
PAINLEVEL_OUTOF10: 8
PAINLEVEL_OUTOF10: 0

## 2018-03-20 ASSESSMENT — LIFESTYLE VARIABLES: EVER_SMOKED: YES

## 2018-03-20 NOTE — ED PROVIDER NOTES
"ED Provider Note    CHIEF COMPLAINT  Chief Complaint   Patient presents with   • ALOC       HPI  Parmjit Castelan is a 77 y.o. male who presents with confusion. The patient has end-stage renal disease on hemodialysis. He was at hemodialysis today when he developed altered level of consciousness. His wife states that he had an episode of vomiting prior dialysis. During dialysis the patient's chest dialysis catheter became dislodged and he lost to 300 mL of blood estimated. After that he developed agitation and altered mental status. He does answer some simple questions he does not know where he is over the date. He denies a specific complaints    REVIEW OF SYSTEMS  See HPI for further details. Respiratoryrespiratory shortness of breath GI denies abdominal pain All other systems are negative.    PAST MEDICAL HISTORY  Past Medical History:   Diagnosis Date   • Anesthesia     \"needs more sedation\" (can sometimes hear MD during procedure)    • Basal cell carcinoma of left cheek 3/26/2015   • BPH 7/14/2009   • Bronchitis 1/1/13   • CAD (coronary artery disease) 2/20/2014   • CATARACT     sanjuanita surgery complete   • CKD (chronic kidney disease) stage 4, GFR 15-29 ml/min (CMS-[facility]) 1/15/2010    end stage renal disease   • COPD (chronic obstructive pulmonary disease) (CMS-HCC)    • COPD (chronic obstructive pulmonary disease) (CMS-[facility]) 8/2/2011   • Detached retina    • Dialysis     m,w,f [facility]   • EMPHYSEMA    • Gout    • Heart burn     occas   • High cholesterol    • HTN (hypertension) 1/15/2010   • Indigestion     occas   • Kidney cyst    • Leg pain, bilateral 8/10/2015   • On supplemental oxygen therapy    • Pneumonia    • Proteinuria    • Sleep apnea     O2 PER CANULA  AT NIGHT 2l/m   • Snoring        FAMILY HISTORY  Family History   Problem Relation Age of Onset   • Stroke Mother    • Hypertension Mother    • Lung Disease Father      Emphysema, resp failure   • Genitourinary () Maternal Aunt      hematuria   • Hypertension " Brother        SOCIAL HISTORY  Social History     Social History   • Marital status:      Spouse name: N/A   • Number of children: N/A   • Years of education: N/A     Social History Main Topics   • Smoking status: Former Smoker     Packs/day: 1.00     Years: 40.00     Types: Cigarettes     Quit date: 1/1/2009   • Smokeless tobacco: Never Used      Comment: 1 pk a day for 35 yrs, QUIT JAN 1 2010   • Alcohol use No   • Drug use: No   • Sexual activity: Not on file     Other Topics Concern   • Not on file     Social History Narrative   • No narrative on file       SURGICAL HISTORY  Past Surgical History:   Procedure Laterality Date   • COLONOSCOPY - ENDO  8/15/2016    Procedure: COLONOSCOPY - ENDO;  Surgeon: Mane Whatley M.D.;  Location: ENDOSCOPY [facility];  Service:    • GASTROSCOPY WITH BIOPSY  8/13/2016    Procedure: GASTROSCOPY WITH BIOPSY;  Surgeon: Jorge Leavitt M.D.;  Location: ENDOSCOPY [facility];  Service:    • RECOVERY  12/23/2015    Procedure: VASCULAR CASE-[place]-LEFT ARM FISTULOGRAM WITH ANGIOPLASTY;  Surgeon: Recoveryonly Surgery;  Location: SURGERY PRE-POST PROC UNIT [facility];  Service:    • RECOVERY  3/24/2015    Performed by Recoveryonly Surgery at SURGERY PRE-POST PROC UNIT [facility]   • RECOVERY  7/29/2014    Performed by Ir-Recovery Surgery at SURGERY SAME DAY [facility]   • RECOVERY  3/24/2014    Performed by Ir[facility]Recovery Surgery at SURGERY [facility]   • RECOVERY  12/17/2013    Performed by Ir-Recovery Surgery at SURGERY SAME DAY [facility]   • RECOVERY  7/2/2013    Performed by Ir-Recovery Surgery at SURGERY SAME DAY [facility]   • AV FISTULA THROMBOLYSIS  7/2/2013    Performed by David Cason M.D. at SURGERY [facility]   • RECOVERY  1/29/2013    Performed by Ir-Recovery Surgery at SURGERY SAME DAY [facility]   • RECOVERY  7/23/2012    Performed by SURGERY, IR-RECOVERY at SURGERY SAME DAY [facility]   • VITRECTOMY POSTERIOR  10/11/2011    Performed by  NAHUM GE at SURGERY SAME DAY [facility]   • RECOVERY  8/12/2011    Performed by SURGERY, IR-RECOVERY at SURGERY SAME DAY [facility]   • VITRECTOMY POSTERIOR  1/18/2011    Performed by NAHUM GE at SURGERY SAME DAY [facility]   • SCLERAL BUCKLING  1/18/2011    Performed by NAHUM GE at SURGERY SAME DAY [facility]   • AV FISTULOGRAM  9/17/2010    Performed by ALESHIA MOSCOSO at SURGERY [facility]   • ANGIOPLASTY BALLOON  9/17/2010    Performed by ALESHIA MOSCOSO at SURGERY [facility]   • AV FISTULOGRAM  7/23/2010    Performed by ALESHIA MOSCOSO at SURGERY [facility]   • ANGIOPLASTY BALLOON  7/23/2010    Performed by ALESHIA MOSCOSO at SURGERY [facility]   • AV FISTULA REVISION  2/19/2010    Performed by WILLIE AHTCH at SURGERY [facility]   • AV FISTULA CREATION  2/12/2010    Performed by ALESHIA MOSCOSO at SURGERY [facility]   • CATARACT PHACO WITH IOL  4/8/08    Performed by STACEY PHILLIPS at SURGERY SAME DAY [facility]       CURRENT MEDICATIONS  Home Medications    **Home medications have not yet been reviewed for this encounter**         ALLERGIES  No Known Allergies    PHYSICAL EXAM  VITAL SIGNS: BP (!) 217/79   Pulse 80   Temp 37.2 °C (99 °F)   Resp 20   Wt 73.1 kg (161 lb 0.7 oz)   SpO2 98%   BMI 24.49 kg/m²     Constitutional: Well developed, Well nourished, agitated confused appearance.   Psychiatric: Anxious affect, unable to give a good history.  HENT: Normocephalic, Atraumatic, Bilateral external ears normal, mucous membranes moist, No oral exudates, Nose normal.      Neck: Normal range of motion, No tenderness, Supple, No stridor.   Lymphatic: No cervical or axillary lymphadenopathy noted.   Cardiovascular: Normal heart rate, Normal rhythm, No murmurs,  , No gallops.   Thorax & Lungs: Normal breath sounds, No respiratory distress, No wheezing, or other abnormal breath sounds. No chest tenderness.   Abdomen: Bowel  sounds normal, Soft, No tenderness, No masses, No pulsatile masses. No guarding.  Skin: Warm, Dry, No erythema, No rash.   Back: No tenderness, No CVA tenderness.   Extremities: Intact distal pulses, No edema, No tenderness, No cyanosis, No clubbing. Fistula left arm  Musculoskeletal: Good range of motion in all major joints. No tenderness to palpation or major deformities, or injuries noted.   Neurologic: Alert & oriented x 1 Normal motor function, Normal sensory function, No focal deficits noted.        RADIOLOGY/PROCEDURES  DX-CHEST-PORTABLE (1 VIEW)   Final Result      Enlarged cardia silhouette without evidence of acute disease.      CT-HEAD W/O   Final Result      1.  Motion artifact significantly limits exam.   2.  Diffuse atrophy and white matter changes.   3.  No gross acute intracranial adenopathy.        Results for orders placed or performed during the hospital encounter of 03/19/18   Lactic acid (lactate)   Result Value Ref Range    Lactic Acid 2.10 (H) 0.50 - 2.00 mmol/L   CBC WITH DIFFERENTIAL   Result Value Ref Range    WBC 4.2 (L) 4.8 - 10.8 K/uL    RBC 2.53 (L) 4.70 - 6.10 M/uL    Hemoglobin 8.3 (L) 14.0 - 18.0 g/dL    Hematocrit 24.7 (L) 42.0 - 52.0 %    MCV 97.6 81.4 - 97.8 fL    MCH 32.8 27.0 - 33.0 pg    MCHC 33.6 (L) 33.7 - 35.3 g/dL    RDW 51.3 (H) 35.9 - 50.0 fL    Platelet Count 222 164 - 446 K/uL    MPV 9.6 9.0 - 12.9 fL    Neutrophils-Polys 78.00 (H) 44.00 - 72.00 %    Lymphocytes 11.50 (L) 22.00 - 41.00 %    Monocytes 7.40 0.00 - 13.40 %    Eosinophils 2.40 0.00 - 6.90 %    Basophils 0.50 0.00 - 1.80 %    Immature Granulocytes 0.20 0.00 - 0.90 %    Nucleated RBC 0.00 /100 WBC    Neutrophils (Absolute) 3.25 1.82 - 7.42 K/uL    Lymphs (Absolute) 0.48 (L) 1.00 - 4.80 K/uL    Monos (Absolute) 0.31 0.00 - 0.85 K/uL    Eos (Absolute) 0.10 0.00 - 0.51 K/uL    Baso (Absolute) 0.02 0.00 - 0.12 K/uL    Immature Granulocytes (abs) 0.01 0.00 - 0.11 K/uL    NRBC (Absolute) 0.00 K/uL   COMP METABOLIC  PANEL   Result Value Ref Range    Sodium 136 135 - 145 mmol/L    Potassium 4.2 3.6 - 5.5 mmol/L    Chloride 95 (L) 96 - 112 mmol/L    Co2 26 20 - 33 mmol/L    Anion Gap 15.0 (H) 0.0 - 11.9    Glucose 96 65 - 99 mg/dL    Bun 68 (H) 8 - 22 mg/dL    Creatinine 5.77 (HH) 0.50 - 1.40 mg/dL    Calcium 10.0 8.4 - 10.2 mg/dL    AST(SGOT) 23 12 - 45 U/L    ALT(SGPT) 22 2 - 50 U/L    Alkaline Phosphatase 85 30 - 99 U/L    Total Bilirubin 0.9 0.1 - 1.5 mg/dL    Albumin 3.6 3.2 - 4.9 g/dL    Total Protein 6.6 6.0 - 8.2 g/dL    Globulin 3.0 1.9 - 3.5 g/dL    A-G Ratio 1.2 g/dL   TROPONIN   Result Value Ref Range    Troponin I 0.09 (H) 0.00 - 0.04 ng/mL   PROTHROMBIN TIME   Result Value Ref Range    PT 14.6 12.0 - 14.6 sec    INR 1.15 (H) 0.87 - 1.13   APTT   Result Value Ref Range    APTT 29.8 24.7 - 36.0 sec   ESTIMATED GFR   Result Value Ref Range    GFR If  12 (A) >60 mL/min/1.73 m 2    GFR If Non African American 10 (A) >60 mL/min/1.73 m 2   EKG (ER)   Result Value Ref Range    Report       [facility] Emergency Dept.    Test Date:  2018  Pt Name:    EDVIN GREEN               Department: [facility]  MRN:        [id number]                      [facility]  Gender:     Male                         Technician: HRR  :        [id number]                   Requested By:JULISA FERNANDEZ  Order #:    [id number]                    Reading MD:    Measurements  Intervals                                Axis  Rate:       104                          P:          -43  CA:         160                          QRS:        49  QRSD:       132                          T:          -74  QT:         408  QTc:        537    Interpretive Statements  SINUS TACHYCARDIA  RIGHT BUNDLE BRANCH BLOCK  ST DEPRESSION, CONSIDER ISCHEMIA, ANT-LAT LDS  Compared to ECG 04/15/2017 16:10:25  ST (T wave) deviation now present  Possible ischemia now present  Sinus rhythm no longer present         COURSE & MEDICAL  DECISION MAKING  Pertinent Labs & Imaging studies reviewed. (See chart for details)  Patient presented with altered mental status following dialysis. He did have some blood loss dialysis which does not seem likely to be responsible blood pressure somewhat elevated but this is a chronic finding as well. CVAs considered however he does not have any focal neurologic deficits CT scan is negative. His mental status does seem to be improving somewhat remains confused however. Other significant findings elevated troponin 0.09 which is likely secondary to renal failure has slightly elevated lactic acid level of questionable significance. Will require hospital admission    FINAL IMPRESSION  1. Altered mental status  2.   3.       Electronically signed by: Benson Pisano, 3/19/2018 8:21 PM

## 2018-03-20 NOTE — PROGRESS NOTES
0830Report received, plan of care reviewed and discussed, assessment complete, oriented to room, bed alarm on, nonskid socks applied, advised to call for assistance.   1030 Discussed plan of care, encouraged and answered all questions, will continue to monitor.  1230 Up in chair, confused, unable to reorient, family at bedside, discussed plan of care.  1430 Up in bed, discussed plan of care with family.  1630 Up in bed, MD (GI and Nephrology) at bedside to discuss plan of care, Supervisor at bedside to discuss plan of care.  1905 Report given to next shift.  Cardiac Summary.  Sinus rhythm with frequent PACs (0.14/0.14/0.36)

## 2018-03-20 NOTE — ASSESSMENT & PLAN NOTE
- Likely due to a combination of uremia and possibly UTI  - CT head had motion artifact but otherwise negative  - He does take trazodone and Percocet at home however per his wife he has not been misusing them recently.   - Avoid sedation for now, MRI would likely be low yield due to artifact   - Now improved after treating uremia  - Started trazodone at half dose to see if this can help prevent building up of metabolites in his body causing altered mental status again

## 2018-03-20 NOTE — PROGRESS NOTES
Dr. Newman called and notified (0630) patient had a large, dark tarry BM this morning around 0600. Patient vitals are stable at this time HR 90s, BP 130s systolic over 50s diastolic. Patient denies CP or SOB. AM labs reviewed with physician.    See orders.    Telephone blood consent was done via telephone at 0700 with patient's wife, Yaquelin. Kelsey BYRD was second nurse witness to consent with patient's wife due to patient's intermittent confusion. Yaquelin did consent for blood. Blood consent form signed and placed with chart.

## 2018-03-20 NOTE — CARE PLAN
Problem: Safety  Goal: Will remain free from falls  Outcome: PROGRESSING AS EXPECTED  Patient is slightly confused as to why he is in the hospital, patient has been reoriented as needed. Patient did show RN how to contact staff using call bell. Patient's bed alarm is on to maintain safety. Patient instructed to call for help.     Problem: Venous Thromboembolism (VTW)/Deep Vein Thrombosis (DVT) Prevention:  Goal: Patient will participate in Venous Thrombosis (VTE)/Deep Vein Thrombosis (DVT)Prevention Measures  Outcome: PROGRESSING AS EXPECTED  Patient tolerating SCDs at this time. Patient instructed on purpose of SCDs and how the SCDs aide in DVT prevention.     Problem: Bowel/Gastric:  Goal: Will not experience complications related to bowel motility  Outcome: PROGRESSING AS EXPECTED  Patient has not had a BM since 3/17. Patient educated on importance of hydration, mobility, and diet. Patient educated about senna medication and patient willing to take medications to prevent constipation.

## 2018-03-20 NOTE — PROGRESS NOTES
Patient admitted to unit via from Ed. Patient walked from [facility] to hospital bed with assist of one staff member. Patient denies chest pain, sob at this time. Patient placed in hospital gown and new telemetry leads placed on patient. Patient and his wife, Yaquelin, educated about hospital information, reviewed plan of care.     Dual RN skin assessment performed. Patient's skin intact. Patient does have some mild bruising noted to bilateral upper extremities. The patient also has a Left arm fistula.

## 2018-03-20 NOTE — ED NOTES
Patient brought in by ROSALIO from dialysis. Patient had sudden onset of ALOC, pt currently altered.

## 2018-03-20 NOTE — ED NOTES
Patient does not answer questions appropriately, not speaking in completely sentences, pt restless. ERP informed and at bedside.  IV started, blood drawn and sent to lab

## 2018-03-20 NOTE — ED NOTES
Med rec updated and complete  Allergies reviewed  Pt had a list of medication in his chart.  Pt is not sure what he takes and when he took it last.  Pts wife is not sure when he took his medications last.

## 2018-03-20 NOTE — ED NOTES
Unable to complete patient med rec as patient is altered, and patient's wife does not know patient's medications.

## 2018-03-20 NOTE — PROGRESS NOTES
[facility] Hospitalist Progress Note    Date of Service: 3/20/2018    Chief Complaint  77 y.o. male admitted 3/19/2018 with anemia and confusion    Interval Problem Update  3/20: BUN increased to greater than 100. Hemoglobin dropped to 5.6, transfused one unit. Resume home Percocet plus breakthrough IV morphine. Consulted SLICK Messina and nephro Nylk.    He will be admitted to inpatient for further management that is expected to extend the LOS to greater than 2 midnights.     Patient is critically ill.   The patient continues to have: Anemia requiring transfusion  The vital organ system that is affected is the: Circulation  If untreated there is a high chance of deterioration into: Shock  And eventually death.   The critical care that I am providing today is: Exam, consultation, transfusion  The critical that has been undertaken is medically complex.   There has been no overlap in critical care time.   Critical Care Time not including procedures: 38 mins    Disposition  Pending improvement of anemia and confusion     Review of Systems   Unable to perform ROS: Medical condition      Physical Exam  Laboratory/Imaging   Hemodynamics  Temp (24hrs), Av.6 °C (97.8 °F), Min:36.3 °C (97.3 °F), Max:37.2 °C (99 °F)   Temperature: 36.4 °C (97.6 °F)  Pulse  Av.8  Min: 80  Max: 104 Heart Rate (Monitored): (!) 108  Blood Pressure : (!) 162/63 (Rn notified), NIBP: 149/71      Respiratory      Respiration: 20, Pulse Oximetry: 100 %, O2 Daily Delivery Respiratory : Room Air with O2 Available        RUL Breath Sounds: Diminished, RML Breath Sounds: Diminished, RLL Breath Sounds: Diminished, SILVANA Breath Sounds: Clear, LLL Breath Sounds: Diminished    Fluids    Intake/Output Summary (Last 24 hours) at 18 1525  Last data filed at 18 1200   Gross per 24 hour   Intake           1287.5 ml   Output                0 ml   Net           1287.5 ml       Nutrition  Orders Placed This Encounter   Procedures   • DIET NPO      Standing Status:   Standing     Number of Occurrences:   1     Order Specific Question:   Restrict to:     Answer:   Sips with Medications [3]     Physical Exam   Constitutional: He appears well-developed.   HENT:   Head: Normocephalic.   Cardiovascular: Normal rate.  Exam reveals no gallop.    Pulmonary/Chest: No respiratory distress. He has no wheezes.   Abdominal: He exhibits no distension. There is tenderness. There is no rebound and no guarding.   Neurological: He is alert.       Recent Labs      03/19/18 1827 03/20/18   0246  03/20/18   0751  03/20/18   1347   WBC  4.2*  5.6   --    --    RBC  2.53*  1.98*   --    --    HEMOGLOBIN  8.3*  6.6*  5.6*  7.2*   HEMATOCRIT  24.7*  20.5*   --    --    MCV  97.6  103.5*   --    --    MCH  32.8  33.3*   --    --    MCHC  33.6*  32.2*   --    --    RDW  51.3*  55.5*   --    --    PLATELETCT  222  221   --    --    MPV  9.6  10.1   --    --      Recent Labs      03/19/18 1827 03/20/18   0246   SODIUM  136  135   POTASSIUM  4.2  5.6*   CHLORIDE  95*  96   CO2  26  27   GLUCOSE  96  110*   BUN  68*  104*   CREATININE  5.77*  7.10*   CALCIUM  10.0  9.8     Recent Labs      03/19/18 1827   APTT  29.8   INR  1.15*                  Assessment/Plan     Acute metabolic encephalopathy- (present on admission)   Assessment & Plan    - Waxing and waning  - Uremia likely contributing  - CT head had motion artifact but otherwise negative  - He does take trazodone and Percocet at home however per his wife he has not been misusing them recently.   - Avoid sedation for now, MRI would likely be low yield due to artifacet         Uncontrolled hypertension- (present on admission)   Assessment & Plan    Blood pressure now improved after initial treatment. Will place patient on IV when necessary blood pressure medications and restart home blood pressure medications. Family is not certain of all the patient's medication, they will bring the list from home, will reconcile when we have  the list.        Acute blood loss anemia- (present on admission)   Assessment & Plan    On top of ESRD anemia chronic disease  - Normocytic anemia  - Transfusing to keep hemoglobin over seven  - Complaint of coffee-ground emesis and melena prior to admission, none since  - Consulted GI Pezanoski        Uremia- (present on admission)   Assessment & Plan    - Due to GI bleed and ESRD  - Consulted nephalexis Guerra        Elevated troponin- (present on admission)   Assessment & Plan    - Likely due to ESRD  - Stable trend        ESRD (end stage renal disease) on dialysis (CMS-HCC)- (present on admission)   Assessment & Plan    On hemodialysis Monday Wednesday Friday  - Consulted nephalexis Guerra        COPD exacerbation (CMS-HCC)- (present on admission)   Assessment & Plan    Not in acute exacerbation. We'll continue medications and breathing treatments when necessary.          Quality-Core Measures   Reviewed items::  Labs reviewed and Medications reviewed  Poon catheter::  No Poon  DVT prophylaxis pharmacological::  Contraindicated - Anemia requiring blood transfusion and Contraindicated - High bleeding risk

## 2018-03-20 NOTE — DISCHARGE PLANNING
Outpatient Dialysis Note    Confirmed patient is active at:    [facility]  [address]   [place] [address]      Schedule: Monday, Wednesday, Friday  Time: 3:00 pm    Spoke with Jazmin at facility who confirmed.    Forwarded records for review.    Dialysis Coordinator, [facility]  Dawna Santa [phone]

## 2018-03-20 NOTE — ASSESSMENT & PLAN NOTE
On top of ESRD anemia chronic disease  - Normocytic anemia  - Transfusing to keep hemoglobin over seven  - Complaint of coffee-ground emesis and melena prior to admission  - Monitoring stools in the hospital  - Consulted GI Mayuri  - Transfusing to keep hemoglobin above seven

## 2018-03-20 NOTE — H&P
Hospital Medicine History and Physical    Date of Service  3/19/2018    Chief Complaint  Chief Complaint   Patient presents with   • ALOC       History of Presenting Illness  77 y.o. male who presented 3/19/2018 with altered inpatient. Upon my evaluation patient was oriented ×3 and follows commands however is unable to give a lot of meaningful history, answers questions somewhat appropriately. History is obtained mostly from the patient's 2 sons and patient's wife was present at bedside. According to the patient's wife and he was at his baseline up until yesterday when he started to become tired and usually he likes to drive but when they were going to lunch he asked her to drive. He did not have much fluid because he felt like he was nauseous and had reflux-like symptoms. Today she took him to dialysis and a culture from the dialysis center telling her that he was unable to call her himself because he forgot his phone number and he was disoriented and being very aggressive. Prior to having dialysis he had a very large episode of vomiting which she described as dark in color. Patient himself states that he has noticed black stools. He does have a history of GI bleeding the past and was scoped one year ago. He otherwise gets dialysis Monday Wednesday Friday and is compliant. He has COPD and intermittently on oxygen at home. Denies any weakness, numbness, headaches, visual disturbances, chest pains.   Primary Care Physician  Pcp Pt States None    Consultants  none    Code Status  Full    Review of Systems  Review of Systems   Constitutional: Negative for chills, fever and malaise/fatigue.   HENT: Negative for hearing loss.    Eyes: Negative for blurred vision and double vision.   Respiratory: Negative for cough, hemoptysis, sputum production and shortness of breath.    Cardiovascular: Negative for chest pain, palpitations, orthopnea and claudication.   Gastrointestinal: Positive for heartburn. Negative for abdominal  "pain, diarrhea, nausea and vomiting.   Genitourinary: Negative for dysuria and urgency.   Musculoskeletal: Negative for myalgias.   Skin: Negative for rash.   Neurological: Negative for dizziness, tingling, tremors, focal weakness, seizures, loss of consciousness and headaches.   Psychiatric/Behavioral: Negative for depression.        Past Medical History  Past Medical History:   Diagnosis Date   • Leg pain, bilateral 8/10/2015   • Basal cell carcinoma of left cheek 3/26/2015   • CAD (coronary artery disease) 2/20/2014   • Bronchitis 1/1/13   • COPD (chronic obstructive pulmonary disease) (CMS-HCC) 8/2/2011   • HTN (hypertension) 1/15/2010   • CKD (chronic kidney disease) stage 4, GFR 15-29 ml/min (CMS-HCC) 1/15/2010    end stage renal disease   • BPH 7/14/2009   • Anesthesia     \"needs more sedation\" (can sometimes hear MD during procedure)    • CATARACT     sanjuanita surgery complete   • COPD (chronic obstructive pulmonary disease) (CMS-HCC)    • Detached retina    • Dialysis     m,w,f [facility]   • EMPHYSEMA    • Gout    • Heart burn     occas   • High cholesterol    • Indigestion     occas   • Kidney cyst    • On supplemental oxygen therapy    • Pneumonia    • Proteinuria    • Sleep apnea     O2 PER CANULA  AT NIGHT 2l/m   • Snoring        Surgical History  Past Surgical History:   Procedure Laterality Date   • COLONOSCOPY - ENDO  8/15/2016    Procedure: COLONOSCOPY - ENDO;  Surgeon: Mane Whatley M.D.;  Location: [facility];  Service:    • GASTROSCOPY WITH BIOPSY  8/13/2016    Procedure: GASTROSCOPY WITH BIOPSY;  Surgeon: Jorge Leavitt M.D.;  Location: ENDOS[facility];  Service:    • RECOVERY  12/23/2015    Procedure: VASCULAR CASE-MOSCOSO-LEFT ARM FISTULOGRAM WITH ANGIOPLASTY;  Surgeon: Recoveryonly Surgery;  [place] [facility]  Service:    • RECOVERY  3/24/2015    Performed by [facility] at [place] [facility]   • RECOVERY  7/29/2014    " Performed by Ir-Recovery Surgery at SURGERY SAME DAY [facility] ORS   • RECOVERY  3/24/2014    Performed by Ir-Recovery Surgery at SURGERY [facility] ORS   • RECOVERY  12/17/2013    Performed by Ir-Recovery Surgery at SURGERY SAME DAY [facility] ORS   • RECOVERY  7/2/2013    Performed by Ir-Recovery Surgery at SURGERY SAME DAY [facility] ORS   • AV FISTULA THROMBOLYSIS  7/2/2013    Performed by David Cason M.D. at SURGERY [facility] ORS   • RECOVERY  1/29/2013    Performed by Ir-Recovery Surgery at SURGERY SAME DAY [facility] ORS   • RECOVERY  7/23/2012    Performed by SURGERY, IR-RECOVERY at SURGERY SAME DAY [facility] ORS   • VITRECTOMY POSTERIOR  10/11/2011    Performed by NAHUM GE at SURGERY SAME DAY [facility] ORS   • RECOVERY  8/12/2011    Performed by SURGERY, IR-RECOVERY at SURGERY SAME DAY [facility] ORS   • VITRECTOMY POSTERIOR  1/18/2011    Performed by NAHUM GE at SURGERY SAME DAY [facility] ORS   • SCLERAL BUCKLING  1/18/2011    Performed by NAHUM GE at SURGERY SAME DAY [facility] ORS   • AV FISTULOGRAM  9/17/2010    Performed by DAVID CASON at SURGERY [facility] ORS   • ANGIOPLASTY BALLOON  9/17/2010    Performed by DAVID CASON at SURGERY [facility] ORS   • AV FISTULOGRAM  7/23/2010    Performed by DAVID CASON at SURGERY [facility] ORS   • ANGIOPLASTY BALLOON  7/23/2010    Performed by DAVID CASON at SURGERY [facility] ORS   • AV FISTULA REVISION  2/19/2010    Performed by WILLIE HATCH at SURGERY [facility] ORS   • AV FISTULA CREATION  2/12/2010    Performed by DAVID CASON at SURGERY [facility] ORS   • CATARACT PHACO WITH IOL  4/8/08    Performed by STACEY PHILLIPS at SURGERY SAME DAY [facility] ORS       Medications  No current facility-administered medications on file prior to encounter.      Current Outpatient Prescriptions on File Prior to Encounter   Medication Sig Dispense Refill   • oxyCODONE-acetaminophen (PERCOCET) 5-325 MG Tab  Take 1 Tab by mouth every 8 hours as needed for up to 10 days. 30 Tab 0   • doxazosin (CARDURA) 4 MG Tab TAKE ONE TABLET BY MOUTH DAILY 90 Tab 1   • albuterol (PROVENTIL) 2.5mg/3ml Nebu Soln solution for nebulization 3 mL by Nebulization route every four hours as needed for Shortness of Breath. 30 Bullet 2   • fluticasone (FLONASE) 50 MCG/ACT nasal spray PLACE ONE TO TWO SPRAYS IN EACH NOSTRIL EVERY DAY 1 Bottle 5   • lisinopril (PRINIVIL, ZESTRIL) 40 MG tablet Take 1 Tab by mouth every day. (Patient taking differently: Take 40 mg by mouth 2 times a day.) 90 Tab 3   • furosemide (LASIX) 80 MG Tab Take 1 Tab by mouth 2 times a day. (Patient taking differently: Take 40 mg by mouth 2 times a day.) 180 Tab 3   • labetalol (NORMODYNE) 300 MG Tab Take 2 Tabs by mouth 2 times a day. (Patient taking differently: Take 300 mg by mouth 2 times a day.) 120 Tab 11   • pravastatin (PRAVACHOL) 20 MG Tab TAKE ONE TABLET BY MOUTH DAILY 90 Tab 3   • trazodone (DESYREL) 150 MG Tab TAKE TWO TABLETS BY MOUTH EVERY NIGHT AT BEDTIME (GENERIC FOR DESYREL) 180 Tab 4   • ondansetron (ZOFRAN ODT) 4 MG TABLET DISPERSIBLE DISSOLVE 1 TABLET BY MOUTH EVERY 6 HOURS AS NEEDED FOR NAUSEA 30 Tab 4   • B Complex-C-Folic Acid (DIALYVITE TABLET) Tab TAKE ONE TABLET BY MOUTH DAILY 90 Tab 4   • Calcium Acetate, Phos Binder, 667 MG Cap TAKE TWO CAPSULES BY MOUTH THREE TIMES A DAY WITH A MEAL 180 Cap 11   • amlodipine (NORVASC) 10 MG Tab TAKE ONE TABLET BY MOUTH DAILY 90 Tab 4       Family History  Family History   Problem Relation Age of Onset   • Stroke Mother    • Hypertension Mother    • Lung Disease Father      Emphysema, resp failure   • Genitourinary () Maternal Aunt      hematuria   • Hypertension Brother        Social History  Social History   Substance Use Topics   • Smoking status: Former Smoker     Packs/day: 1.00     Years: 40.00     Types: Cigarettes     Quit date: 1/1/2009   • Smokeless tobacco: Never Used      Comment: 1 pk a day for 35  yrs, QUIT 2010   • Alcohol use No       Allergies  No Known Allergies     Physical Exam  Laboratory   Hemodynamics  Temp (24hrs), Av.2 °C (99 °F), Min:37.2 °C (99 °F), Max:37.2 °C (99 °F)   Temperature: 37.2 °C (99 °F)  Pulse  Av.7  Min: 80  Max: 104 Heart Rate (Monitored): 92  Blood Pressure : (!) 217/79, NIBP: (!) 176/68      Respiratory      Respiration: 18, Pulse Oximetry: 94 %             Physical Exam   Constitutional: He is oriented to person, place, and time. He appears well-developed and well-nourished.   Sitting upright in bed restless   HENT:   Head: Normocephalic and atraumatic.   Eyes: Conjunctivae are normal. Pupils are equal, round, and reactive to light.   Neck: No JVD present.   Cardiovascular: Normal rate, regular rhythm and normal heart sounds.  Exam reveals no friction rub.    No murmur heard.  Pulmonary/Chest: Effort normal and breath sounds normal. No respiratory distress. He has no wheezes. He has no rales.   Abdominal: Soft. He exhibits no distension. There is tenderness. There is no rebound and no guarding.   Musculoskeletal: Normal range of motion.   Neurological: He is alert and oriented to person, place, and time. He displays normal reflexes. No cranial nerve deficit. Coordination normal.   Skin: Skin is warm.   Psychiatric:   Restless,       Recent Labs      18   WBC  4.2*   RBC  2.53*   HEMOGLOBIN  8.3*   HEMATOCRIT  24.7*   MCV  97.6   MCH  32.8   MCHC  33.6*   RDW  51.3*   PLATELETCT  222   MPV  9.6     Recent Labs      18   182   SODIUM  136   POTASSIUM  4.2   CHLORIDE  95*   CO2  26   GLUCOSE  96   BUN  68*   CREATININE  5.77*   CALCIUM  10.0     Recent Labs      18   ALTSGPT  22   ASTSGOT  23   ALKPHOSPHAT  85   TBILIRUBIN  0.9   GLUCOSE  96     Recent Labs      18   APTT  29.8   INR  1.15*             Lab Results   Component Value Date    TROPONINI 0.09 (H) 2018     Urinalysis:    Lab Results  Component Value  Date/Time   SPECGRAVITY 1.019 10/11/2013 1529   GLUCOSEUR Negative 10/11/2013 1529   KETONES Negative 10/11/2013 1529   NITRITE Negative 10/11/2013 1529   WBCURINE 0-2 (A) 10/11/2013 1529   RBCURINE 2-5 (A) 10/11/2013 1529   BACTERIA Few (A) 10/11/2013 1529   EPITHELCELL Few 10/11/2013 1529        Imaging  CT head without contrast    Narrative       3/19/2018 6:36 PM    HISTORY/REASON FOR EXAM:  Altered Mental Status.      TECHNIQUE/EXAM DESCRIPTION AND NUMBER OF VIEWS:  CT of the head without contrast.    The study was performed on a helical multidetector CT scanner. Contiguous 2.5 mm axial sections were obtained from the skull base through the vertex.    Up to date radiation dose reduction adjustments have been utilized to meet ALARA standards for radiation dose reduction.    COMPARISON:  4/16/2017    FINDINGS:  Motion artifact limits exam.  Lateral ventricles are normal in size and symmetric.  Cortical sulci are moderately enlarged.  Patchy areas of low attenuation in the white matter bilaterally.  No significant mass effect or midline shift.  Basal cisterns are patent.  No evidence for intracranial hemorrhage.  Calvaria are intact.  Visualized orbits are unremarkable.  Visualized mastoid air cells are clear.  RIGHT maxillary sinus polyp versus retention cyst.  Calcifications of the carotid arteries noted.   Impression       1.  Motion artifact significantly limits exam.  2.  Diffuse atrophy and white matter changes.  3.  No gross acute intracranial adenopathy.   Reading Provider Reading Date   Neo Govea M.D. Mar 19, 2018        Assessment/Plan  One view chest x-ray      I anticipate this patient is appropriate for observation status at this point.    Encephalopathy- (present on admission)   Assessment & Plan    Patient is alert and oriented ×3, evaluation however on arrival he was disoriented ×3. She follows commands however he appears restless and does not fully answer questions appropriately. History was  mostly obtained from the patient's family including the wife and the 2 sons who are present at bedside. On this point in time the etiology is unclear. His CT head did not show any acute abnormality. He does not have any focal neurologic deficits either. He does take trazodone and Percocet at home however per his wife he has not been misusing them recently. Will place patient on neuro checks every 4. There was report of possible GI bleed however uncertain, may be a contributing factor, we'll workup for that as well. If patient's symptoms do not improve we'll consider obtaining an MRI of the head. Patient's blood pressure was also elevated in the 200 range however now it has improved significantly, we'll continue to monitor as well.        Uncontrolled hypertension- (present on admission)   Assessment & Plan    Blood pressure now improved after initial treatment. Will place patient on IV when necessary blood pressure medications and restart home blood pressure medications. Family is not certain of all the patient's medication, they will bring the list from home, will reconcile when we have the list.        Normocytic anemia- (present on admission)   Assessment & Plan    Patient does have normocytic anemia. His last hemoglobin was around 10 he does have an almost 2 point drop. She did have a very projectile vomiting episode that he described as dark brown. On further questioning patient reports that he may have noticed black stools. Again due to his mental status this is uncertain.    Per Patient's family he has a history of GI bleeding the past and was evaluated with endoscopies about a year ago. Will will check hemoglobin every 4, place the patient on IV PPI, monitor for any signs of bleeding, monitor blood pressure and assess the need to obtain a GI consult.        ESRD (end stage renal disease) on dialysis (CMS-[facility])- (present on admission)   Assessment & Plan    On hemodialysis Monday Wednesday Friday. He does not  have any urgent need for dialysis for now. Will monitor renal panel, continue dialysis Monday Wednesday Friday.        COPD exacerbation (CMS-HCC)- (present on admission)   Assessment & Plan    Not in acute exacerbation. We'll continue medications and breathing treatments when necessary.            VTE prophylaxis: SCDs   Narrative       3/19/2018 6:43 PM    HISTORY/REASON FOR EXAM:  Cough. History of cardiomegaly.      TECHNIQUE/EXAM DESCRIPTION AND NUMBER OF VIEWS:  Single portable view of the chest.    COMPARISON: April 15, 2017    FINDINGS:  The cardiac silhouette is again enlarged.  No pulmonary infiltrates or consolidations are noted.  No pleural abnormalities are noted.   Impression       Enlarged cardia silhouette without evidence of acute disease.   Reading Provider Reading Date   Ken Gonsalves M.D. Mar 19, 2018

## 2018-03-21 PROBLEM — K31.7 GASTRIC POLYPS: Status: ACTIVE | Noted: 2018-03-21

## 2018-03-21 PROBLEM — K20.90 ESOPHAGITIS: Status: ACTIVE | Noted: 2018-03-21

## 2018-03-21 LAB
ALBUMIN SERPL BCP-MCNC: 2.5 G/DL (ref 3.2–4.9)
ALBUMIN SERPL BCP-MCNC: 3 G/DL (ref 3.2–4.9)
ALBUMIN/GLOB SERPL: 1.2 G/DL
ALBUMIN/GLOB SERPL: 1.2 G/DL
ALP SERPL-CCNC: 52 U/L (ref 30–99)
ALP SERPL-CCNC: 60 U/L (ref 30–99)
ALT SERPL-CCNC: 22 U/L (ref 2–50)
ALT SERPL-CCNC: 23 U/L (ref 2–50)
AMMONIA PLAS-SCNC: 45 UMOL/L (ref 11–45)
ANION GAP SERPL CALC-SCNC: 10 MMOL/L (ref 0–11.9)
ANION GAP SERPL CALC-SCNC: 11 MMOL/L (ref 0–11.9)
AST SERPL-CCNC: 38 U/L (ref 12–45)
AST SERPL-CCNC: 41 U/L (ref 12–45)
BILIRUB SERPL-MCNC: 0.9 MG/DL (ref 0.1–1.5)
BILIRUB SERPL-MCNC: 1 MG/DL (ref 0.1–1.5)
BUN SERPL-MCNC: 68 MG/DL (ref 8–22)
BUN SERPL-MCNC: 73 MG/DL (ref 8–22)
CALCIUM SERPL-MCNC: 8.6 MG/DL (ref 8.4–10.2)
CALCIUM SERPL-MCNC: 8.9 MG/DL (ref 8.4–10.2)
CHLORIDE SERPL-SCNC: 100 MMOL/L (ref 96–112)
CHLORIDE SERPL-SCNC: 100 MMOL/L (ref 96–112)
CO2 SERPL-SCNC: 25 MMOL/L (ref 20–33)
CO2 SERPL-SCNC: 28 MMOL/L (ref 20–33)
CREAT SERPL-MCNC: 4.83 MG/DL (ref 0.5–1.4)
CREAT SERPL-MCNC: 5.82 MG/DL (ref 0.5–1.4)
ERYTHROCYTE [DISTWIDTH] IN BLOOD BY AUTOMATED COUNT: 62 FL (ref 35.9–50)
GLOBULIN SER CALC-MCNC: 2.1 G/DL (ref 1.9–3.5)
GLOBULIN SER CALC-MCNC: 2.5 G/DL (ref 1.9–3.5)
GLUCOSE SERPL-MCNC: 83 MG/DL (ref 65–99)
GLUCOSE SERPL-MCNC: 92 MG/DL (ref 65–99)
HCT VFR BLD AUTO: 22 % (ref 42–52)
HGB BLD-MCNC: 7.2 G/DL (ref 14–18)
HGB BLD-MCNC: 7.3 G/DL (ref 14–18)
HGB BLD-MCNC: 7.5 G/DL (ref 14–18)
HGB BLD-MCNC: 7.6 G/DL (ref 14–18)
HGB BLD-MCNC: 7.6 G/DL (ref 14–18)
MCH RBC QN AUTO: 32.6 PG (ref 27–33)
MCHC RBC AUTO-ENTMCNC: 34.5 G/DL (ref 33.7–35.3)
MCV RBC AUTO: 94.4 FL (ref 81.4–97.8)
PATHOLOGY CONSULT NOTE: NORMAL
PLATELET # BLD AUTO: 176 K/UL (ref 164–446)
PMV BLD AUTO: 9.9 FL (ref 9–12.9)
POTASSIUM SERPL-SCNC: 4.2 MMOL/L (ref 3.6–5.5)
POTASSIUM SERPL-SCNC: 4.3 MMOL/L (ref 3.6–5.5)
PROT SERPL-MCNC: 4.6 G/DL (ref 6–8.2)
PROT SERPL-MCNC: 5.5 G/DL (ref 6–8.2)
RBC # BLD AUTO: 2.33 M/UL (ref 4.7–6.1)
SODIUM SERPL-SCNC: 135 MMOL/L (ref 135–145)
SODIUM SERPL-SCNC: 139 MMOL/L (ref 135–145)
WBC # BLD AUTO: 4.9 K/UL (ref 4.8–10.8)

## 2018-03-21 PROCEDURE — 90935 HEMODIALYSIS ONE EVALUATION: CPT

## 2018-03-21 PROCEDURE — 160048 HCHG OR STATISTICAL LEVEL 1-5: Performed by: INTERNAL MEDICINE

## 2018-03-21 PROCEDURE — 85018 HEMOGLOBIN: CPT | Mod: 91

## 2018-03-21 PROCEDURE — A9270 NON-COVERED ITEM OR SERVICE: HCPCS | Performed by: STUDENT IN AN ORGANIZED HEALTH CARE EDUCATION/TRAINING PROGRAM

## 2018-03-21 PROCEDURE — 80053 COMPREHEN METABOLIC PANEL: CPT | Mod: 91

## 2018-03-21 PROCEDURE — 160202 HCHG ENDO MINUTES - 1ST 30 MINS LEVEL 3: Performed by: INTERNAL MEDICINE

## 2018-03-21 PROCEDURE — 5A1D70Z PERFORMANCE OF URINARY FILTRATION, INTERMITTENT, LESS THAN 6 HOURS PER DAY: ICD-10-PCS | Performed by: INTERNAL MEDICINE

## 2018-03-21 PROCEDURE — 700111 HCHG RX REV CODE 636 W/ 250 OVERRIDE (IP)

## 2018-03-21 PROCEDURE — 700105 HCHG RX REV CODE 258: Performed by: STUDENT IN AN ORGANIZED HEALTH CARE EDUCATION/TRAINING PROGRAM

## 2018-03-21 PROCEDURE — 700111 HCHG RX REV CODE 636 W/ 250 OVERRIDE (IP): Performed by: HOSPITALIST

## 2018-03-21 PROCEDURE — 99233 SBSQ HOSP IP/OBS HIGH 50: CPT | Performed by: HOSPITALIST

## 2018-03-21 PROCEDURE — C9113 INJ PANTOPRAZOLE SODIUM, VIA: HCPCS | Performed by: STUDENT IN AN ORGANIZED HEALTH CARE EDUCATION/TRAINING PROGRAM

## 2018-03-21 PROCEDURE — 700102 HCHG RX REV CODE 250 W/ 637 OVERRIDE(OP): Performed by: STUDENT IN AN ORGANIZED HEALTH CARE EDUCATION/TRAINING PROGRAM

## 2018-03-21 PROCEDURE — 160002 HCHG RECOVERY MINUTES (STAT): Performed by: INTERNAL MEDICINE

## 2018-03-21 PROCEDURE — 0DB98ZX EXCISION OF DUODENUM, VIA NATURAL OR ARTIFICIAL OPENING ENDOSCOPIC, DIAGNOSTIC: ICD-10-PCS | Performed by: INTERNAL MEDICINE

## 2018-03-21 PROCEDURE — 82140 ASSAY OF AMMONIA: CPT

## 2018-03-21 PROCEDURE — 88305 TISSUE EXAM BY PATHOLOGIST: CPT

## 2018-03-21 PROCEDURE — 700101 HCHG RX REV CODE 250: Performed by: HOSPITALIST

## 2018-03-21 PROCEDURE — 85027 COMPLETE CBC AUTOMATED: CPT

## 2018-03-21 PROCEDURE — 770020 HCHG ROOM/CARE - TELE (206)

## 2018-03-21 PROCEDURE — 160009 HCHG ANES TIME/MIN: Performed by: INTERNAL MEDICINE

## 2018-03-21 PROCEDURE — 700111 HCHG RX REV CODE 636 W/ 250 OVERRIDE (IP): Performed by: STUDENT IN AN ORGANIZED HEALTH CARE EDUCATION/TRAINING PROGRAM

## 2018-03-21 PROCEDURE — 700101 HCHG RX REV CODE 250: Performed by: STUDENT IN AN ORGANIZED HEALTH CARE EDUCATION/TRAINING PROGRAM

## 2018-03-21 PROCEDURE — C9113 INJ PANTOPRAZOLE SODIUM, VIA: HCPCS

## 2018-03-21 PROCEDURE — 160035 HCHG PACU - 1ST 60 MINS PHASE I: Performed by: INTERNAL MEDICINE

## 2018-03-21 RX ORDER — PANTOPRAZOLE SODIUM 40 MG/10ML
INJECTION, POWDER, LYOPHILIZED, FOR SOLUTION INTRAVENOUS
Status: COMPLETED
Start: 2018-03-21 | End: 2018-03-21

## 2018-03-21 RX ADMIN — LABETALOL HCL 600 MG: 100 TABLET, FILM COATED ORAL at 09:49

## 2018-03-21 RX ADMIN — CLONIDINE HYDROCHLORIDE 0.2 MG: 0.1 TABLET ORAL at 09:48

## 2018-03-21 RX ADMIN — PANTOPRAZOLE SODIUM 80 MG: 40 INJECTION, POWDER, FOR SOLUTION INTRAVENOUS at 04:30

## 2018-03-21 RX ADMIN — LABETALOL HYDROCHLORIDE 10 MG: 5 INJECTION, SOLUTION INTRAVENOUS at 00:15

## 2018-03-21 RX ADMIN — BUDESONIDE AND FORMOTEROL FUMARATE DIHYDRATE 2 PUFF: 160; 4.5 AEROSOL RESPIRATORY (INHALATION) at 21:08

## 2018-03-21 RX ADMIN — WATER 1 G: 1000 INJECTION, SOLUTION INTRAVENOUS at 18:24

## 2018-03-21 RX ADMIN — LORAZEPAM 1 MG: 2 INJECTION INTRAMUSCULAR; INTRAVENOUS at 00:35

## 2018-03-21 RX ADMIN — DOXAZOSIN 4 MG: 2 TABLET ORAL at 09:48

## 2018-03-21 RX ADMIN — BUDESONIDE AND FORMOTEROL FUMARATE DIHYDRATE 2 PUFF: 160; 4.5 AEROSOL RESPIRATORY (INHALATION) at 10:04

## 2018-03-21 RX ADMIN — STANDARDIZED SENNA CONCENTRATE AND DOCUSATE SODIUM 2 TABLET: 8.6; 5 TABLET, FILM COATED ORAL at 21:08

## 2018-03-21 RX ADMIN — AMLODIPINE BESYLATE 10 MG: 5 TABLET ORAL at 09:48

## 2018-03-21 RX ADMIN — LISINOPRIL 40 MG: 20 TABLET ORAL at 09:49

## 2018-03-21 RX ADMIN — PANTOPRAZOLE SODIUM 8 MG/HR: 40 INJECTION, POWDER, FOR SOLUTION INTRAVENOUS at 18:33

## 2018-03-21 ASSESSMENT — PAIN SCALES - GENERAL
PAINLEVEL_OUTOF10: 0

## 2018-03-21 NOTE — CARE PLAN
Problem: Knowledge Deficit  Goal: Knowledge of the prescribed therapeutic regimen will improve  Outcome: PROGRESSING SLOWER THAN EXPECTED  Unable to adequately educate pt on diagnosis and therapeutic regimen. He remains confused. Family provided with education and updated on poc. All questions addressed.

## 2018-03-21 NOTE — PROGRESS NOTES
"Pt now awake and trying to climb out of bed. He has pulled off all of his telemetry leads and removed his underwear. IV remains intact and infusing w/o complications. Pt remains confused and unable to verbalize needs, but he is speaking more words at this time. Cont to attempt to reorient pt and direct him to stay in bed. Smell of BM in the room, but pt is just passing gas at this time. Asked pt if he needs to have a BM and he says \"yes\", but he does not cooperate enough to get on bedpan and is not strong enough for BSC. Will monitor. Sat pt on edge of bed and he has difficulty sitting upright. Pt back into bed to poc. JOHN Lamb obtained VS and shaved chest to reapply tele leads.     @0450  Pt removes tele leads again. Remains restless, trying to get out of bed. Redirection provided.    @0530  Pt cont to attempt to get out of bed. He remains naked and without tele leads on. Pt assisted to recliner chair w/one person assist--unsteady. Currently resting w/eyes closed, resprs even & unlabored. JOHN Lamb sitting in room w/pt. Chair alarm on. Will cont to monitor.   "

## 2018-03-21 NOTE — PROGRESS NOTES
PRBC's obtained from blood bank. Double verified w/ROCHELLE Mensah performing dialysis and blood provided to him to administer w/dialysis tx.

## 2018-03-21 NOTE — PROGRESS NOTES
Bedside report received from Melody Coker. All questions addressed and poc discussed. Pt sitting up in bed in poc--dialysis in progress. Pt confused and trying to get up out of bed. Son is at bedside to assist w/reorienting and reassuring pt. No immediate needs identified. Call light and belongings within reach. Will return for assessment.

## 2018-03-21 NOTE — CARE PLAN
Problem: Safety  Goal: Will remain free from injury  Outcome: PROGRESSING AS EXPECTED  Pt being given medication for anxiety/agitation, and to prevent him from attempting to climb out of bed or pull out lines. No injury or falls so far during hospitalization. Hourly rounding (more often when necessary). Call light and belongings w/in reach. Bed in low, locked position. Clutter-free environment. Non-skid footwear. Appropriate signs on door and wristband.

## 2018-03-21 NOTE — OR NURSING
1456- Pt to pacu via gurney with side rails up.  VSS. Pt minimally responsive, will lift eyebrows to voice, not following command to open eyes. Pt appears comfortable, without pain nausea.  1530-VSS.  Pt very difficult to arouse.  1550- pt meets transfer criteria back to room, report to ROCHELLE Crockett.

## 2018-03-21 NOTE — PROGRESS NOTES
Bedside report given to Melody Guerrero. All questions addressed and poc discussed. No immediate needs identified. Safety precautions in place. Cares turned over at this time.

## 2018-03-21 NOTE — PROGRESS NOTES
Pt beginning to get restless again at this time. He is swinging his legs out of the bed, attempting to get up. He has pulled tele leads off. Assisted pt back to bed and attempted reorientation. He remains unable to verbalize needs (asked him about temperature, needing to pee, pain, but no appropriate response). Repositioned pt to poc and straightened out sheets. VS obtained and BP is high. Labetolol administered--see MAR. Remained in pt room w/him until he could receive Ativan again. Pt cont to try to sit up and swing legs out of bed. Ativan administered at 0035 while pt sitting on bedside. He began to doze off, so assisted him w/safely lying in bed. In poc. Blankets applied. Lights dimmed. Bed alarm on. Will allow for rest and cont to monitor.

## 2018-03-21 NOTE — PROGRESS NOTES
3 hr HD was completed at 2022 hrs. Net UF 2200 ml. 1 unit PRBC given during HD. Tolerated tx well. He was medicated for agitation during hd.  LAUREN DAVIES with B/T. Post HD report given to his Primary RN.

## 2018-03-21 NOTE — PROGRESS NOTES
GI consult just done. MD says pt can have clears and will be NPO at [place]. Jello and ice chips provided to family. They assist pt w/taking a few bites of Jello w/o complications.

## 2018-03-21 NOTE — PROGRESS NOTES
Patient oriented to self, took all his medications, followed commands, stated having no pain.  Son came to see him after but patient was sleeping, updates gave it to son.

## 2018-03-21 NOTE — PROGRESS NOTES
Telemetry Summary    Rhythm: Sinus Rhythm/Sinus Tachycardia with BBB  Rate: 80's to low-100's  Ectopy: F-PAC, R-PVC  Pr: 0.18  QRS: 0.12  Qt: 0.36

## 2018-03-21 NOTE — PROGRESS NOTES
Pt remains up in recliner chair, with bed alarm on. He is resting quietly w/eyes closed, resprs even & unlabored, NAD noted. No needs identified. Will cont to monitor and report to oncoming shift.

## 2018-03-21 NOTE — PROGRESS NOTES
No change to pt status. He cont to rest quietly in bed w/eyes closed, resprs even & unlabored, NAD noted. No needs identified. IVF infusing w/o complications. Tele monitor in place. Bed alarm on. Call light and belongings w/in reach. Will allow for rest and cont to monitor.

## 2018-03-21 NOTE — PROGRESS NOTES
Lab in to draw Hemoglobin. Pt resting quietly at this time--eyes closed, resprs even & unlabored, NAD noted. Call light and belongings w/in reach. Bed alarm on. Will cont to monitor.

## 2018-03-21 NOTE — PROGRESS NOTES
"Spoke w/Dr. Smith earlier (at around 1930) concerning pt's continued agitation and restlessness despite Haldol. Pt cont to try to climb out of bed and is disrupting dialysis. Family at bedside attempting to redirect. Pt unable to verbalize needs. New order obtained for Ativan and administered this at this time--see MAR for details. Held oral medications at this time as pt does not appear to be able to safely swallow them or follow directions. Assessment performed. Wife remains at bedside--discussed medications and poc w/her. Also had wife sign EGD consent form. Pt appears oriented to self only (he makes eye contact when his name is spoken), but it is unclear if he recognizes his family. His only response to questions is \"yeah\" or \"ok\" but responses are not always appropriate in context. No further needs identified at this time. Will allow the dialysis nurse to finish and will cont to monitor.   "

## 2018-03-21 NOTE — OR NURSING
1400- Pt brought to pre-op holding, Pt alert to self only and hard to arouse.  VSS, wakes for short periods of time.  1415- Lab called to draw labs in pre-op holding. Dr. Jimenez in with Pt. Wife signed procedure consent, but not anesthesia consent.    1430- Dr. Ramos in with Pt. Pt unable to sign own consent attempt to call wife but no answer.    1440- Physicians double signed consent and Pt taken to procedure./

## 2018-03-21 NOTE — CONSULTS
"Date of Service:    Consult Requested By: Pan Smith M.D.    Reason for Consultation: hematemesis    History of Present Illness:   Parmjit Castelan is a 77 y.o. who was recently admitted yesterday with concerns of hematemesis and melena. On evaluation of his medical records apparently is evident that he has had this performed the past. He underwent an upper endoscopy and colonoscopy in 2016 at [facility] and the only findings were 2 polyps in the colon a small AVM and parakeratosis of the esophagus. He had a capsule endoscopy performed at [facility] which I do not have the records for at this time. His family states that he was in his usual state of health except on [place] when he had some corned beef and apparently his mentation became erratic. When this occurred he also became nauseated but subsequently followed by several bouts of nausea vomiting and eventually hematemesis. He has a very involved family and they tell me that apparently while he was at dialysis became very erratic and subsequently was noted to have a low hemoglobin and was transferred to [facility] for further evaluation. In fact his hemoglobin was 5.6 and subsequently blood transfusions were initiated and is receiving a 2nd unit this time. He is presently receiving dialysis and given that his creatinine is greater than 7 and his BUN is greater than 100. Co. He has not had any ground emesis melena for at least 12 hours now has not had any further events of hematemesis     Review Of Systems:  Can not be ascertained secondary to his mentation    PMH:   Past Medical History:   Diagnosis Date   • Anesthesia     \"needs more sedation\" (can sometimes hear MD during procedure)    • Basal cell carcinoma of left cheek 3/26/2015   • BPH 7/14/2009   • Bronchitis 1/1/13   • CAD (coronary artery disease) 2/20/2014   • CATARACT     sanjuanita surgery complete   • CKD (chronic kidney disease) stage 4, GFR 15-29 ml/min (CMS-[facility]) 1/15/2010    end stage renal " disease   • COPD (chronic obstructive pulmonary disease) (CMS-HCC)    • COPD (chronic obstructive pulmonary disease) (CMS-HCC) 8/2/2011   • Detached retina    • Dialysis     m,w,f [facility]   • EMPHYSEMA    • Gout    • Heart burn     occas   • High cholesterol    • HTN (hypertension) 1/15/2010   • Indigestion     occas   • Kidney cyst    • Leg pain, bilateral 8/10/2015   • On supplemental oxygen therapy    • Pneumonia    • Proteinuria    • Sleep apnea     O2 PER CANULA  AT NIGHT 2l/m   • Snoring          PSH:  Past Surgical History:   Procedure Laterality Date   • COLONOSCOPY - ENDO  8/15/2016    Procedure: COLONOSCOPY - ENDO;  Surgeon: Mane Whatley M.D.;  Location: ENDOSCOPY [facility];  Service:    • GASTROSCOPY WITH BIOPSY  8/13/2016    Procedure: GASTROSCOPY WITH BIOPSY;  Surgeon: Jorge Leavitt M.D.;  Location: ENDOSCOPY [facility];  Service:    • RECOVERY  12/23/2015    Procedure: VASCULAR CASE-[place]-LEFT ARM FISTULOGRAM WITH ANGIOPLASTY;  Surgeon: Recoveryonly Surgery;  [place] [facility] [facility]  Service:    • RECOVERY  3/24/2015    Performed by Recoveryonly Surgery at SURGERY [facility] [facility]   • RECOVERY  7/29/2014    Performed by Ir-Recovery Surgery at SURGERY SAME DAY [facility]   • RECOVERY  3/24/2014    Performed by Ir-Recovery Surgery at SURGERY [facility]   • RECOVERY  12/17/2013    Performed by Ir-Recovery Surgery at SURGERY SAME DAY [facility]   • RECOVERY  7/2/2013    Performed by Ir-Recovery Surgery at SURGERY SAME DAY [facility]   • AV FISTULA THROMBOLYSIS  7/2/2013    Performed by David Cason M.D. at SURGERY [facility]   • RECOVERY  1/29/2013    Performed by Ir-Recovery Surgery at SURGERY SAME DAY [facility]   • RECOVERY  7/23/2012    Performed by SURGERY, IR-RECOVERY at SURGERY SAME DAY [facility]   • VITRECTOMY POSTERIOR  10/11/2011    Performed by NAHUM GE at SURGERY SAME DAY [facility]   • RECOVERY  8/12/2011     Performed by SURGERY, IR-RECOVERY at SURGERY SAME DAY [facility]   • VITRECTOMY POSTERIOR  1/18/2011    Performed by NAHUM GE at SURGERY SAME DAY [facility]   • SCLERAL BUCKLING  1/18/2011    Performed by NAHUM GE at SURGERY SAME DAY [facility]   • AV FISTULOGRAM  9/17/2010    Performed by ALESHIA MOSCOSO at SURGERY [facility]   • ANGIOPLASTY BALLOON  9/17/2010    Performed by ALESHIA MOSCOSO at SURGERY [facility]   • AV FISTULOGRAM  7/23/2010    Performed by ALESHIA MOSCOSO at SURGERY [facility]   • ANGIOPLASTY BALLOON  7/23/2010    Performed by ALESHIA MOSCOSO at SURGERY [facility]   • AV FISTULA REVISION  2/19/2010    Performed by WILLIE HATCH at SURGERY [facility]   • AV FISTULA CREATION  2/12/2010    Performed by ALESHIA MOSCOSO at SURGERY [facility]   • CATARACT PHACO WITH IOL  4/8/08    Performed by STACEY PHILLIPS at SURGERY SAME DAY [facility]       FAMILY HX:  Family History   Problem Relation Age of Onset   • Stroke Mother    • Hypertension Mother    • Lung Disease Father      Emphysema, resp failure   • Genitourinary () Maternal Aunt      hematuria   • Hypertension Brother        SOCIAL HX:  Social History     Social History   • Marital status:      Spouse name: N/A   • Number of children: N/A   • Years of education: N/A     Occupational History   • Not on file.     Social History Main Topics   • Smoking status: Former Smoker     Packs/day: 1.00     Years: 40.00     Types: Cigarettes     Quit date: 1/1/2009   • Smokeless tobacco: Never Used      Comment: 1 pk a day for 35 yrs, QUIT JAN 1 2010   • Alcohol use No   • Drug use: No   • Sexual activity: Not on file     Other Topics Concern   • Not on file     Social History Narrative   • No narrative on file     History   Smoking Status   • Former Smoker   • Packs/day: 1.00   • Years: 40.00   • Types: Cigarettes   • Quit date: 1/1/2009   Smokeless Tobacco   • Never Used      Comment: 1 pk a day for 35 yrs, QUIT JAN 1 2010     History   Alcohol Use No       Allergies/Intolerances:  No Known Allergies    History reviewed with the patient    Other Current Medications:    Current Facility-Administered Medications:   •  pantoprazole (PROTONIX) 80 mg in  mL Infusion, 8 mg/hr, Intravenous, Continuous, Aurora Newman M.D., Last Rate: 25 mL/hr at 03/20/18 1804, 8 mg/hr at 03/20/18 1804  •  oxyCODONE-acetaminophen (PERCOCET) 5-325 MG per tablet 1 Tab, 1 Tab, Oral, Q8HRS PRN, Pan Smith M.D., 1 Tab at 03/20/18 0932  •  morphine (pf) 4 mg/ml injection 1-4 mg, 1-4 mg, Intravenous, Q4HRS PRN, Pan Smith M.D., 4 mg at 03/20/18 1201  •  LORazepam (ATIVAN) injection 1 mg, 1 mg, Intravenous, Q4HRS PRN, Pan Smith M.D.  •  albuterol (PROVENTIL) 2.5mg/0.5ml nebulizer solution 2.5 mg, 2.5 mg, Nebulization, Q4H PRN (RT), Aurora Newman M.D.  •  senna-docusate (PERICOLACE or SENOKOT S) 8.6-50 MG per tablet 2 Tab, 2 Tab, Oral, BID, Stopped at 03/20/18 0900 **AND** polyethylene glycol/lytes (MIRALAX) PACKET 1 Packet, 1 Packet, Oral, QDAY PRN **AND** magnesium hydroxide (MILK OF MAGNESIA) suspension 30 mL, 30 mL, Oral, QDAY PRN **AND** bisacodyl (DULCOLAX) suppository 10 mg, 10 mg, Rectal, QDAY PRN, Aurora Newman M.D.  •  Respiratory Care per Protocol, , Nebulization, Continuous RT, Aurora Newman M.D.  •  labetalol (NORMODYNE,TRANDATE) injection 10 mg, 10 mg, Intravenous, Q4HRS PRN, Aurora Newman M.D.  •  haloperidol lactate (HALDOL) injection 5 mg, 5 mg, Intravenous, Q4HRS PRN, Aurora Newman M.D., 5 mg at 03/20/18 1838  •  ondansetron (ZOFRAN) syringe/vial injection 4 mg, 4 mg, Intravenous, Q4HRS PRN, Aurora Newman M.D.  •  amLODIPine (NORVASC) tablet 10 mg, 10 mg, Oral, Q DAY, Aurora Newman M.D., Stopped at 03/20/18 0900  •  budesonide-formoterol (SYMBICORT) 160-4.5 MCG/ACT inhaler 2 Puff, 2 Puff, Inhalation, BID, Aurora Newman M.D., 2 Puff at 03/20/18 0908  •  cloNIDine  "(CATAPRES) tablet 0.2 mg, 0.2 mg, Oral, BID, Aurora Newman M.D., Stopped at 18 0900  •  doxazosin (CARDURA) tablet 4 mg, 4 mg, Oral, Q DAY, Aurora Newman M.D., Stopped at 18 0900  •  labetalol (NORMODYNE) tablet 600 mg, 600 mg, Oral, BID, Aurora Newman M.D., Stopped at 18 09  •  lisinopril (PRINIVIL) tablet 40 mg, 40 mg, Oral, DAILY, Aurora Newman M.D., Stopped at 18 0900  [unfilled]    Most Recent Vital Signs:  /81   Pulse (!) 111   Temp 36.8 °C (98.2 °F)   Resp (!) 28   Ht 1.727 m (5' 8\")   Wt 72.6 kg (160 lb 0.9 oz)   SpO2 100%   BMI 24.34 kg/m²   Temp  Av.6 °C (97.8 °F)  Min: 36.3 °C (97.3 °F)  Max: 37.2 °C (99 °F)    Physical Exam:  General: Nontoxic, no acute distress  HEENT: sclera anicteric, PERRL, EOMI, MMM, no oral lesions  Neck: supple, no lymphadenopathy  Chest: CTAB, no r/r/w, normal work of breathing.  Cardiac: Regular, no murmurs no gallops heard  Abdomen: + bowel sounds, soft, non-tender, non-distended, no HSM  Extremities: No edema. No joint swelling.  Skin: no rashes or erythema  Neuro: he is alert but is disoriented.  Will follow commands    Pertinent Lab Results:  Recent Labs      18   1827  18   0246   WBC  4.2*  5.6      Recent Labs      18   1827  18   0246  18   0751  18   1347  18   1730   HEMOGLOBIN  8.3*  6.6*  5.6*  7.2*  7.0*   HEMATOCRIT  24.7*  20.5*   --    --    --    MCV  97.6  103.5*   --    --    --    MCH  32.8  33.3*   --    --    --    PLATELETCT  222  221   --    --    --          Recent Labs      187  18   0246   SODIUM  136  135   POTASSIUM  4.2  5.6*   CHLORIDE  95*  96   CO2  26  27   CREATININE  5.77*  7.10*        Recent Labs      18   0246   ALBUMIN  3.6  3.0*      Recent Labs      187  18   0246   ASTSGOT  23  27   ALTSGPT  22  20   TBILIRUBIN  0.9  0.6   ALKPHOSPHAT  85  70   GLOBULIN  3.0  2.7   INR  " "1.15*   --        [unfilled]      Pertinent Micro:  Results     Procedure Component Value Units Date/Time    BLOOD CULTURE [[id number]] Collected:  03/19/18 1827    Order Status:  Completed Specimen:  Blood from Peripheral Updated:  03/20/18 0655    Narrative:       Per Hospital Policy: Only change Specimen Src: to \"Line\" if  specified by physician order.    BLOOD CULTURE [[id number]] Collected:  03/19/18 1901    Order Status:  Completed Specimen:  Blood from Peripheral Updated:  03/20/18 0655    Narrative:       Per Hospital Policy: Only change Specimen Src: to \"Line\" if  specified by physician order.    URINALYSIS [[id number]]  (Abnormal) Collected:  03/19/18 2009    Order Status:  Completed Specimen:  Urine Updated:  03/20/18 0553     Color Yellow     Character Sl Cloudy (A)     Comment: Corrected result; previously reported as Clear on 03/20/18 at 05:40        Specific Gravity 1.010     Ph 8.5 (A)     Glucose 100 (A) mg/dL      Ketones Negative mg/dL      Protein >=300 (A) mg/dL      Bilirubin Negative     Nitrite Negative     Leukocyte Esterase Negative     Occult Blood Small (A)     Micro Urine Req Microscopic    Narrative:       Indication for culture:->Emergency Room Patient    Urinalysis [[id number]]     Order Status:  No result Specimen:  Urine     URINE CULTURE(NEW) [[id number]] Collected:  03/19/18 2009    Order Status:  Completed Specimen:  Urine Updated:  03/19/18 2058    Narrative:       Indication for culture:->Emergency Room Patient    URINALYSIS [[id number]]     Order Status:  Canceled Specimen:  Urine     BLOOD CULTURE [[id number]]     Order Status:  Canceled Specimen:  Blood from Peripheral     BLOOD CULTURE [[id number]]     Order Status:  Canceled Specimen:  Blood from Peripheral     URINALYSIS CULTURE, IF INDICATED [[id number]] Collected:  03/19/18 0000    Order Status:  Canceled Specimen:  Urine         Blood Culture   Date Value Ref Range Status   04/17/2017 No growth after 5 days of incubation.  " Final        Studies:        Results for orders placed during the hospital encounter of 07/14/16   DX-CERVICAL SPINE-2 OR 3 VIEWS    Impression 1.  Degenerative disc disease and facet arthropathy are most prominent in the lower cervical spine.    2.  No compression deformity or acute fracture.    3.  Anterolisthesis noted at C7-T1 likely due to facet arthropathy.          Results for orders placed in visit on 03/21/17   DX-CHEST-2 VIEWS    Impression 1. No pulmonary infiltrates or consolidations are noted.    2. Stable cardiomegaly.            Results for orders placed during the hospital encounter of 11/27/07   DX-FEMUR    Impression IMPRESSION:    NO ACUTE ABNORMALITIES ARE IDENTIFIED IN THE LEFT FEMUR.      TRB/map           Read By JULISA LORENZO MD on Nov 27 2007  3:47PM  : MAP Transcription Date: Nov 28 2007  6:54AM  THIS DOCUMENT HAS BEEN ELECTRONICALLY SIGNED BY: JULISA LORENZO MD on   Nov 29 2007  8:02AM      Results for orders placed in visit on 08/10/17   DX-FINGER(S) 2+ RIGHT    Impression 1.  Findings suggests fracture of the proximal dorsal aspect of the proximal phalanx of the third digit.    2.  Probable additional subtle fracture of the ulnar aspect of the distal third metacarpal bone.        Results for orders placed during the hospital encounter of 11/23/07   DX-FOOT-COMPLETE 3+    Impression IMPRESSION:    1. DEGENERATIVE CHANGE OF THE BASE OF THE GREAT TOE INVOLVING THE FIRST   METATARSOPHALANGEAL JOINT WITH ASSOCIATED EDEMA.          2. POSSIBLE LYTIC LESION IN THE PROXIMAL FIFTH METATARSAL.  THE ETIOLOGY   IS UNCERTAIN.    3. NO RADIOPAQUE FOREIGN BODY OR SOFT TISSUE GAS IDENTIFIED.    4. NO FRACTURE IS SEEN.    MM:dxm        Read By CATHY LYNN MD on Nov 23 2007  1:31PM  : DXM Transcription Date: Nov 23 2007  5:48PM  THIS DOCUMENT HAS BEEN ELECTRONICALLY SIGNED BY: CATHY LYNN MD on Nov 30 2007  8:14AM          Results for orders placed during the  hospital encounter of 06/22/15   DX-HAND 3+    Impression Mild degenerative change of the interphalangeal articulations and the triscaphe articulation.                          Results for orders placed during the hospital encounter of 11/27/07   DX-LUMBAR SPINE-2 OR 3 VIEWS    Impression IMPRESSION:    1. MILD L4-L5 SPONDYLOLISTHESIS.    2. MODERATE DEGENERATIVE DISK DISEASE AND FACET ARTHROPATHY.    3. OTHERWISE NO ACUTE ABNORMALITIES ARE NOTED IN THE LUMBAR SPINE.                  Results for orders placed during the hospital encounter of 11/27/07   DX-PELVIS-1 OR 2 VIEWS    Impression IMPRESSION:    MILD TO MODERATE OSTEOARTHRITIS IS NOTED IN BOTH HIPS, BUT NO ACUTE   FRACTURE OR DISLOCATION IS NOTED ON SINGLE VIEW PELVIS.                                              IMPRESSION:     Anemia  Melena  Hematemesis        PLAN:   At this time would recommend monitoring his H&H every 6 hours and keeping a greater than 721, blood transfusions. 2 large IV bore needle was and start him on IV Protonix 40 mg twice a day. The plan will be to perform an upper endoscopy in the morning, presently it is fine from this clear liquids and Jell-O. Most likely source is possibly gastritis esophagitis Mikki-Garrett tear or some ulceration. I see no reason at this time to perform a colonoscopy given that he has had one recently within the last year or so and apparently has had a capsule endoscopy as well. I had a very extensive discussion with the family members and they are agreeable to the plan that we discussed. I informed him of the risks and benefits of the procedure, bleeding, infection, perforation and they're agreeable.      Discussed with IM. Will continue to follow    Ken Summers M.D.

## 2018-03-21 NOTE — PROCEDURES
DATE OF SERVICE:  03/21/2018    PROCEDURE:  Esophagogastroduodenoscopy.    PREOPERATIVE DIAGNOSIS:  Gastrointestinal bleed.    POSTOPERATIVE DIAGNOSES:  Gastric polyps, duodenal bulb nodularity and   esophagitis.    ENDOSCOPIST:  Enrique Child DO    INDICATION OF PROCEDURE:  GI bleed.    ANESTHESIA:  Per anesthesiologist in the endoscopy suite.    DESCRIPTION OF PROCEDURE:  After informed consent and appropriate sedation,   the patient was placed in left lateral position and the adult gastroscope was   advanced through the oropharynx into the esophagus through to the second   portion of the duodenum.  Second portion of the duodenum as well as the sweep   were unremarkable.  The scope was withdrawn back into the duodenal bulb.    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  There were no signs of any GI   bleeding.  The scope was withdrawn back to the stomach.  Gastric body and   antrum were relatively unremarkable.  On retroflexion, the cardia and fundus   showed a nonspecific gastropathy with no signs of any GI bleeding.  Scope was   used to decompress the stomach and then withdrawn back towards the esophagus.    The GE junction showed LA grade B esophagitis and the remainder of the   esophagus was normal.  There were no immediate postop complications.    RECOMMENDATIONS:  1.  PPI once daily orally.  2.  Clear liquids, advance diet as tolerated.  3.  Watch serial H and H overnight.  4.  No plans for colonoscopy as of now as the patient has had no obvious   rectal bleeding and also had a colonoscopy relatively recently.  5.  Source could be the patient's esophagitis; however, if symptoms recur, may   consider doing a push enteroscopy.  6.  Avoid all NSAIDs and ideally blood thinners if able to.  7.  Please call if you have any questions or concerns.       ____________________________________     Enrique Child DO    RS / NTS    DD:  03/21/2018  14:58:02  DT:  03/21/2018 15:14:33    D#:  [id number]  Job#:  [id number]

## 2018-03-21 NOTE — PROGRESS NOTES
Pt resting in bed in poc. Eyes closed, resprs even & unlabored, NAD noted. Call light and belongings w/in reach. No needs identified. Will allow for rest and cont to monitor.

## 2018-03-22 PROBLEM — G47.00 INSOMNIA: Status: ACTIVE | Noted: 2018-03-22

## 2018-03-22 PROBLEM — N39.0 UTI (URINARY TRACT INFECTION): Status: ACTIVE | Noted: 2018-03-22

## 2018-03-22 LAB
ALBUMIN SERPL BCP-MCNC: 3 G/DL (ref 3.2–4.9)
ALBUMIN/GLOB SERPL: 1.2 G/DL
ALP SERPL-CCNC: 61 U/L (ref 30–99)
ALT SERPL-CCNC: 24 U/L (ref 2–50)
ANION GAP SERPL CALC-SCNC: 10 MMOL/L (ref 0–11.9)
AST SERPL-CCNC: 52 U/L (ref 12–45)
BILIRUB SERPL-MCNC: 0.5 MG/DL (ref 0.1–1.5)
BUN SERPL-MCNC: 35 MG/DL (ref 8–22)
CALCIUM SERPL-MCNC: 8.7 MG/DL (ref 8.4–10.2)
CHLORIDE SERPL-SCNC: 99 MMOL/L (ref 96–112)
CO2 SERPL-SCNC: 28 MMOL/L (ref 20–33)
CREAT SERPL-MCNC: 3.75 MG/DL (ref 0.5–1.4)
ERYTHROCYTE [DISTWIDTH] IN BLOOD BY AUTOMATED COUNT: 64.4 FL (ref 35.9–50)
GLOBULIN SER CALC-MCNC: 2.5 G/DL (ref 1.9–3.5)
GLUCOSE SERPL-MCNC: 90 MG/DL (ref 65–99)
HCT VFR BLD AUTO: 21.6 % (ref 42–52)
HGB BLD-MCNC: 6.8 G/DL (ref 14–18)
HGB BLD-MCNC: 7 G/DL (ref 14–18)
HGB BLD-MCNC: 7.1 G/DL (ref 14–18)
HGB BLD-MCNC: 7.3 G/DL (ref 14–18)
HGB BLD-MCNC: 9.2 G/DL (ref 14–18)
MCH RBC QN AUTO: 31.8 PG (ref 27–33)
MCHC RBC AUTO-ENTMCNC: 32.9 G/DL (ref 33.7–35.3)
MCV RBC AUTO: 96.9 FL (ref 81.4–97.8)
PLATELET # BLD AUTO: 174 K/UL (ref 164–446)
PMV BLD AUTO: 9.6 FL (ref 9–12.9)
POTASSIUM SERPL-SCNC: 3.9 MMOL/L (ref 3.6–5.5)
PROT SERPL-MCNC: 5.5 G/DL (ref 6–8.2)
RBC # BLD AUTO: 2.23 M/UL (ref 4.7–6.1)
SODIUM SERPL-SCNC: 137 MMOL/L (ref 135–145)
WBC # BLD AUTO: 4.8 K/UL (ref 4.8–10.8)

## 2018-03-22 PROCEDURE — G8988 SELF CARE GOAL STATUS: HCPCS | Mod: CI

## 2018-03-22 PROCEDURE — A9270 NON-COVERED ITEM OR SERVICE: HCPCS | Performed by: HOSPITALIST

## 2018-03-22 PROCEDURE — G8979 MOBILITY GOAL STATUS: HCPCS | Mod: CI

## 2018-03-22 PROCEDURE — G8980 MOBILITY D/C STATUS: HCPCS | Mod: CI

## 2018-03-22 PROCEDURE — 80053 COMPREHEN METABOLIC PANEL: CPT

## 2018-03-22 PROCEDURE — 97161 PT EVAL LOW COMPLEX 20 MIN: CPT

## 2018-03-22 PROCEDURE — 700105 HCHG RX REV CODE 258: Performed by: STUDENT IN AN ORGANIZED HEALTH CARE EDUCATION/TRAINING PROGRAM

## 2018-03-22 PROCEDURE — 99233 SBSQ HOSP IP/OBS HIGH 50: CPT | Performed by: HOSPITALIST

## 2018-03-22 PROCEDURE — 30233N1 TRANSFUSION OF NONAUTOLOGOUS RED BLOOD CELLS INTO PERIPHERAL VEIN, PERCUTANEOUS APPROACH: ICD-10-PCS | Performed by: HOSPITALIST

## 2018-03-22 PROCEDURE — 700101 HCHG RX REV CODE 250: Performed by: HOSPITALIST

## 2018-03-22 PROCEDURE — 97535 SELF CARE MNGMENT TRAINING: CPT

## 2018-03-22 PROCEDURE — 700102 HCHG RX REV CODE 250 W/ 637 OVERRIDE(OP): Performed by: STUDENT IN AN ORGANIZED HEALTH CARE EDUCATION/TRAINING PROGRAM

## 2018-03-22 PROCEDURE — 85018 HEMOGLOBIN: CPT

## 2018-03-22 PROCEDURE — C9113 INJ PANTOPRAZOLE SODIUM, VIA: HCPCS | Performed by: STUDENT IN AN ORGANIZED HEALTH CARE EDUCATION/TRAINING PROGRAM

## 2018-03-22 PROCEDURE — 770020 HCHG ROOM/CARE - TELE (206)

## 2018-03-22 PROCEDURE — P9016 RBC LEUKOCYTES REDUCED: HCPCS

## 2018-03-22 PROCEDURE — G8978 MOBILITY CURRENT STATUS: HCPCS | Mod: CI

## 2018-03-22 PROCEDURE — 86923 COMPATIBILITY TEST ELECTRIC: CPT

## 2018-03-22 PROCEDURE — A9270 NON-COVERED ITEM OR SERVICE: HCPCS | Performed by: STUDENT IN AN ORGANIZED HEALTH CARE EDUCATION/TRAINING PROGRAM

## 2018-03-22 PROCEDURE — 700111 HCHG RX REV CODE 636 W/ 250 OVERRIDE (IP): Performed by: HOSPITALIST

## 2018-03-22 PROCEDURE — 700111 HCHG RX REV CODE 636 W/ 250 OVERRIDE (IP): Performed by: STUDENT IN AN ORGANIZED HEALTH CARE EDUCATION/TRAINING PROGRAM

## 2018-03-22 PROCEDURE — G8989 SELF CARE D/C STATUS: HCPCS | Mod: CI

## 2018-03-22 PROCEDURE — 36430 TRANSFUSION BLD/BLD COMPNT: CPT

## 2018-03-22 PROCEDURE — 700102 HCHG RX REV CODE 250 W/ 637 OVERRIDE(OP): Performed by: HOSPITALIST

## 2018-03-22 PROCEDURE — G8987 SELF CARE CURRENT STATUS: HCPCS | Mod: CI

## 2018-03-22 PROCEDURE — 85027 COMPLETE CBC AUTOMATED: CPT

## 2018-03-22 PROCEDURE — 97165 OT EVAL LOW COMPLEX 30 MIN: CPT

## 2018-03-22 RX ORDER — TRAZODONE HYDROCHLORIDE 50 MG/1
150 TABLET ORAL
Status: DISCONTINUED | OUTPATIENT
Start: 2018-03-22 | End: 2018-03-23 | Stop reason: HOSPADM

## 2018-03-22 RX ORDER — FUROSEMIDE 40 MG/1
40 TABLET ORAL 2 TIMES DAILY
Status: DISCONTINUED | OUTPATIENT
Start: 2018-03-22 | End: 2018-03-23 | Stop reason: HOSPADM

## 2018-03-22 RX ORDER — ROPINIROLE 0.5 MG/1
0.25 TABLET, FILM COATED ORAL
Status: DISCONTINUED | OUTPATIENT
Start: 2018-03-22 | End: 2018-03-23 | Stop reason: HOSPADM

## 2018-03-22 RX ORDER — ATORVASTATIN CALCIUM 10 MG/1
10 TABLET, FILM COATED ORAL NIGHTLY
Status: DISCONTINUED | OUTPATIENT
Start: 2018-03-22 | End: 2018-03-23 | Stop reason: HOSPADM

## 2018-03-22 RX ORDER — LISINOPRIL 20 MG/1
40 TABLET ORAL 2 TIMES DAILY
Status: DISCONTINUED | OUTPATIENT
Start: 2018-03-22 | End: 2018-03-22

## 2018-03-22 RX ORDER — PRAVASTATIN SODIUM 20 MG
20 TABLET ORAL EVERY EVENING
Status: DISCONTINUED | OUTPATIENT
Start: 2018-03-22 | End: 2018-03-23 | Stop reason: HOSPADM

## 2018-03-22 RX ORDER — TAMSULOSIN HYDROCHLORIDE 0.4 MG/1
0.4 CAPSULE ORAL EVERY EVENING
Status: DISCONTINUED | OUTPATIENT
Start: 2018-03-22 | End: 2018-03-23 | Stop reason: HOSPADM

## 2018-03-22 RX ORDER — LABETALOL 100 MG/1
300 TABLET, FILM COATED ORAL 2 TIMES DAILY
Status: DISCONTINUED | OUTPATIENT
Start: 2018-03-22 | End: 2018-03-23 | Stop reason: HOSPADM

## 2018-03-22 RX ORDER — OXYCODONE HYDROCHLORIDE AND ACETAMINOPHEN 5; 325 MG/1; MG/1
1-2 TABLET ORAL DAILY
Status: DISCONTINUED | OUTPATIENT
Start: 2018-03-22 | End: 2018-03-22

## 2018-03-22 RX ORDER — OXYCODONE HYDROCHLORIDE AND ACETAMINOPHEN 5; 325 MG/1; MG/1
1-2 TABLET ORAL EVERY 8 HOURS PRN
Status: DISCONTINUED | OUTPATIENT
Start: 2018-03-22 | End: 2018-03-23 | Stop reason: HOSPADM

## 2018-03-22 RX ORDER — IRBESARTAN 150 MG/1
300 TABLET ORAL NIGHTLY
Status: DISCONTINUED | OUTPATIENT
Start: 2018-03-22 | End: 2018-03-23 | Stop reason: HOSPADM

## 2018-03-22 RX ORDER — TRAZODONE HYDROCHLORIDE 50 MG/1
300 TABLET ORAL
Status: DISCONTINUED | OUTPATIENT
Start: 2018-03-22 | End: 2018-03-22

## 2018-03-22 RX ORDER — OMEPRAZOLE 20 MG/1
40 CAPSULE, DELAYED RELEASE ORAL 2 TIMES DAILY
Status: DISCONTINUED | OUTPATIENT
Start: 2018-03-22 | End: 2018-03-23 | Stop reason: HOSPADM

## 2018-03-22 RX ORDER — OMEPRAZOLE 20 MG/1
80 CAPSULE, DELAYED RELEASE ORAL DAILY
Status: DISCONTINUED | OUTPATIENT
Start: 2018-03-22 | End: 2018-03-22

## 2018-03-22 RX ADMIN — OMEPRAZOLE 40 MG: 20 CAPSULE, DELAYED RELEASE ORAL at 13:53

## 2018-03-22 RX ADMIN — BUDESONIDE AND FORMOTEROL FUMARATE DIHYDRATE 2 PUFF: 160; 4.5 AEROSOL RESPIRATORY (INHALATION) at 09:00

## 2018-03-22 RX ADMIN — LABETALOL HCL 600 MG: 100 TABLET, FILM COATED ORAL at 08:22

## 2018-03-22 RX ADMIN — CLONIDINE HYDROCHLORIDE 0.2 MG: 0.1 TABLET ORAL at 22:20

## 2018-03-22 RX ADMIN — LISINOPRIL 40 MG: 20 TABLET ORAL at 08:24

## 2018-03-22 RX ADMIN — CLONIDINE HYDROCHLORIDE 0.2 MG: 0.1 TABLET ORAL at 08:23

## 2018-03-22 RX ADMIN — LABETALOL HYDROCHLORIDE 300 MG: 100 TABLET, FILM COATED ORAL at 22:19

## 2018-03-22 RX ADMIN — PANTOPRAZOLE SODIUM 8 MG/HR: 40 INJECTION, POWDER, FOR SOLUTION INTRAVENOUS at 06:14

## 2018-03-22 RX ADMIN — AMLODIPINE BESYLATE 10 MG: 5 TABLET ORAL at 08:23

## 2018-03-22 RX ADMIN — DOXAZOSIN 4 MG: 2 TABLET ORAL at 08:24

## 2018-03-22 RX ADMIN — FUROSEMIDE 40 MG: 40 TABLET ORAL at 13:53

## 2018-03-22 RX ADMIN — MORPHINE SULFATE 4 MG: 4 INJECTION INTRAVENOUS at 22:21

## 2018-03-22 RX ADMIN — ROPINIROLE HYDROCHLORIDE 0.25 MG: 0.5 TABLET, FILM COATED ORAL at 22:19

## 2018-03-22 RX ADMIN — OMEPRAZOLE 20 MG: 20 CAPSULE, DELAYED RELEASE ORAL at 22:20

## 2018-03-22 RX ADMIN — PRAVASTATIN SODIUM 20 MG: 20 TABLET ORAL at 22:21

## 2018-03-22 RX ADMIN — WATER 1 G: 1000 INJECTION, SOLUTION INTRAVENOUS at 09:00

## 2018-03-22 RX ADMIN — TRAZODONE HYDROCHLORIDE 150 MG: 50 TABLET ORAL at 22:21

## 2018-03-22 RX ADMIN — BUDESONIDE AND FORMOTEROL FUMARATE DIHYDRATE 2 PUFF: 160; 4.5 AEROSOL RESPIRATORY (INHALATION) at 22:36

## 2018-03-22 ASSESSMENT — ENCOUNTER SYMPTOMS
PALPITATIONS: 0
FOCAL WEAKNESS: 0
NERVOUS/ANXIOUS: 1
SHORTNESS OF BREATH: 0
HEADACHES: 0
DIZZINESS: 0
INSOMNIA: 1
SENSORY CHANGE: 0
FEVER: 0
VOMITING: 0
BACK PAIN: 0
COUGH: 0
CHILLS: 0
MYALGIAS: 0
SPEECH CHANGE: 0
ABDOMINAL PAIN: 0
HEMOPTYSIS: 0
WHEEZING: 0
NAUSEA: 0
EYES NEGATIVE: 1
CONSTIPATION: 0
ORTHOPNEA: 0
DIARRHEA: 0

## 2018-03-22 ASSESSMENT — PATIENT HEALTH QUESTIONNAIRE - PHQ9
1. LITTLE INTEREST OR PLEASURE IN DOING THINGS: NOT AT ALL
SUM OF ALL RESPONSES TO PHQ9 QUESTIONS 1 AND 2: 0
2. FEELING DOWN, DEPRESSED, IRRITABLE, OR HOPELESS: NOT AT ALL

## 2018-03-22 ASSESSMENT — ACTIVITIES OF DAILY LIVING (ADL): TOILETING: INDEPENDENT

## 2018-03-22 ASSESSMENT — PAIN SCALES - GENERAL
PAINLEVEL_OUTOF10: 0
PAINLEVEL_OUTOF10: 5

## 2018-03-22 ASSESSMENT — GAIT ASSESSMENTS
DISTANCE (FEET): 300
GAIT LEVEL OF ASSIST: STAND BY ASSIST

## 2018-03-22 NOTE — PROGRESS NOTES
Patient has woken up, oriented to self, place and event. Drank some water, dialisys in progress, he is calm and pain free.

## 2018-03-22 NOTE — PROGRESS NOTES
Upon reviewing pt's chart, noticed that 1800 Hgb has not been drawn. Called lab and they state they do not see the order on their end. New order placed for Q4Hour HGB starting now.

## 2018-03-22 NOTE — PROGRESS NOTES
Nephrology Progress Note, Adult, Complex               Author: Amena Tenoriohannahfelicitas Date & Time created: 3/22/2018  4:42 PM     Interval History:  76 y/o male with ESRD/HD admitted with AMS, anemia  Doing much better  AAO  HD MWF    Review of Systems:  Review of Systems   Constitutional: Positive for malaise/fatigue. Negative for chills and fever.   HENT: Negative.    Eyes: Negative.    Respiratory: Negative for cough, hemoptysis, shortness of breath and wheezing.    Cardiovascular: Negative for chest pain, palpitations, orthopnea and leg swelling.   Gastrointestinal: Negative for abdominal pain, diarrhea, nausea and vomiting.   Genitourinary: Negative for dysuria.   Musculoskeletal: Negative for back pain, joint pain and myalgias.   Skin: Negative.    Neurological: Negative for dizziness, sensory change, speech change, focal weakness and headaches.   Psychiatric/Behavioral: The patient is nervous/anxious.        Physical Exam:  Physical Exam   Constitutional: He is oriented to person, place, and time. He appears well-developed and well-nourished. No distress.   HENT:   Head: Normocephalic and atraumatic.   Nose: Nose normal.   Mouth/Throat: Oropharynx is clear and moist.   Eyes: Conjunctivae and EOM are normal. Pupils are equal, round, and reactive to light. No scleral icterus.   Neck: Normal range of motion. Neck supple.   Cardiovascular: Normal rate and regular rhythm.  Exam reveals no gallop and no friction rub.    Pulmonary/Chest: Effort normal and breath sounds normal. No respiratory distress. He has no wheezes. He has no rales.   Abdominal: Soft. Bowel sounds are normal. He exhibits no distension. There is no tenderness.   Musculoskeletal: He exhibits no edema.   Neurological: He is alert and oriented to person, place, and time. No cranial nerve deficit.   Skin: Skin is warm. No rash noted. No erythema.   Nursing note and vitals reviewed.      Labs:        Invalid input(s): [id number]  Recent Labs      03/19/18    1827 03/20/18 0246 03/20/18   0751   TROPONINI  0.09*  0.12*  0.10*     Recent Labs      03/21/18 0210 03/21/18 1430 03/22/18   0022   SODIUM  139  135  137   POTASSIUM  4.2  4.3  3.9   CHLORIDE  100  100  99   CO2  28  25  28   BUN  68*  73*  35*   CREATININE  4.83*  5.82*  3.75*   CALCIUM  8.9  8.6  8.7     Recent Labs      03/21/18 0210 03/21/18 1430 03/22/18   0022   ALTSGPT  23  22  24   ASTSGOT  41  38  52*   ALKPHOSPHAT  60  52  61   TBILIRUBIN  1.0  0.9  0.5   GLUCOSE  83  92  90     Recent Labs      03/19/18 1827 03/19/18 2133 03/20/18 0246 03/21/18 0210 03/22/18   0023  03/22/18   0330  03/22/18   0808  03/22/18   1319   RBC  2.53*   --   1.98*   --   2.33*   --   2.23*   --    --    --    HEMOGLOBIN  8.3*   --   6.6*   < >  7.6*   < >  7.1*  6.8*  7.0*  7.3*   HEMATOCRIT  24.7*   --   20.5*   --   22.0*   --   21.6*   --    --    --    PLATELETCT  222   --   221   --   176   --   174   --    --    --    PROTHROMBTM  14.6   --    --    --    --    --    --    --    --    --    APTT  29.8   --    --    --    --    --    --    --    --    --    INR  1.15*   --    --    --    --    --    --    --    --    --    IRON   --   168   --    --    --    --    --    --    --    --    FERRITIN   --   216.2   --    --    --    --    --    --    --    --    TOTIRONBC   --   290   --    --    --    --    --    --    --    --     < > = values in this interval not displayed.     Recent Labs      03/19/18 1827 03/20/18 0246 03/21/18 0210 03/21/18   1430  03/22/18   0022  03/22/18   0023   WBC  4.2*  5.6  4.9   --    --   4.8   NEUTSPOLYS  78.00*  73.90*   --    --    --    --    LYMPHOCYTES  11.50*  14.20*   --    --    --    --    MONOCYTES  7.40  8.80   --    --    --    --    EOSINOPHILS  2.40  2.20   --    --    --    --    BASOPHILS  0.50  0.50   --    --    --    --    ASTSGOT  23  27  41  38  52*   --    ALTSGPT  22  20  23  22  24   --    ALKPHOSPHAT  85  70  60  52  61   --     TBILIRUBIN  0.9  0.6  1.0  0.9  0.5   --            Hemodynamics:  Temp (24hrs), Av.6 °C (97.8 °F), Min:36 °C (96.8 °F), Max:37.1 °C (98.8 °F)  Temperature: 36.6 °C (97.9 °F)  Pulse  Av.1  Min: 64  Max: 111   Blood Pressure : 145/40     Respiratory:    Respiration: 16, Pulse Oximetry: 99 %        RUL Breath Sounds: Clear, RML Breath Sounds: Clear;Diminished, RLL Breath Sounds: Diminished, SILVANA Breath Sounds: Clear, LLL Breath Sounds: Clear;Diminished  Fluids:    Intake/Output Summary (Last 24 hours) at 18 1642  Last data filed at 18 0900   Gross per 24 hour   Intake             1040 ml   Output             2050 ml   Net            -1010 ml        GI/Nutrition:  Orders Placed This Encounter   Procedures   • DIET ORDER     Standing Status:   Standing     Number of Occurrences:   1     Order Specific Question:   Diet:     Answer:   Renal [8]     Medical Decision Making, by Problem:  Active Hospital Problems    Diagnosis   • Acute metabolic encephalopathy [G93.41]   • Uncontrolled hypertension [I10]   • Acute blood loss anemia [D62]   • Gastric polyps [K31.7]   • Esophagitis [K20.9]   • Elevated troponin [R74.8]   • Uremia [N19]   • ESRD (end stage renal disease) on dialysis (CMS-HCC) [N18.6, Z99.2]   • COPD exacerbation (CMS-HCC) [J44.1]       Quality-Core Measures   Reviewed items::  Labs reviewed and Medications reviewed    Assessment and plan  1.ESRD/HD -MWF  2.HTN: BP well controlled  3.Electrolytes: well controlled.  4.Anemia: low Hb  -scheduled blood transfusion  5.AMS -improved  6.Volume: UF with HD as BP tolerates    Recs: HD MWF, renal diet, all meds to renal doses

## 2018-03-22 NOTE — CARE PLAN
Problem: Safety  Goal: Will remain free from falls    Intervention: Implement fall precautions  Patient instructed to call for assistance when needed.      Problem: Knowledge Deficit  Goal: Knowledge of the prescribed therapeutic regimen will improve  Outcome: PROGRESSING AS EXPECTED  Discussed plan of care.

## 2018-03-22 NOTE — PROGRESS NOTES
[facility] Hospitalist Progress Note    Date of Service: 3/22/2018    Chief Complaint  77 y.o. male admitted 3/19/2018 with anemia and uremia causing confusion    Interval Problem Update  3/20: BUN increased to greater than 100. Hemoglobin dropped to 5.6, transfused one unit. Resume home Percocet plus breakthrough IV morphine. Consulted GI Mayuri and nephro Charlie.  3/21: EGD done today showed esophagitis. PPI per GI. Urine culture grew LFGNR, started on ceftriaxone. BUN improved to 70s with dialysis yesterday and another dialysis will be done today. Hemoglobin stabilized in the 7-8 range.  3/22: Resume home trazodone but at half dose. Urine culture growing out Morganella. Transfusing one unit RBCs. BUN improved to 35. Change omeprazole to oral.    Disposition  Pending improvement of uremia causing confusion     Review of Systems   Constitutional: Negative for chills and fever.   Respiratory: Negative for cough, shortness of breath and wheezing.    Cardiovascular: Negative for chest pain.   Gastrointestinal: Negative for constipation, diarrhea, nausea and vomiting.   Genitourinary: Negative for dysuria.   Neurological: Negative for headaches.   Psychiatric/Behavioral: The patient is nervous/anxious and has insomnia.       Physical Exam  Laboratory/Imaging   Hemodynamics  Temp (24hrs), Av.5 °C (97.7 °F), Min:36 °C (96.8 °F), Max:37.1 °C (98.8 °F)   Temperature: 36.6 °C (97.9 °F)  Pulse  Av.4  Min: 64  Max: 111 Heart Rate (Monitored): 74  Blood Pressure : 122/40 (Rn notified)      Respiratory      Respiration: 18, Pulse Oximetry: 93 %        RUL Breath Sounds: Clear, RML Breath Sounds: Clear;Diminished, RLL Breath Sounds: Diminished, SILVANA Breath Sounds: Clear, LLL Breath Sounds: Clear;Diminished    Fluids    Intake/Output Summary (Last 24 hours) at 18 1534  Last data filed at 18 0900   Gross per 24 hour   Intake             1065 ml   Output             2050 ml   Net             -985 ml        Nutrition  Orders Placed This Encounter   Procedures   • DIET ORDER     Standing Status:   Standing     Number of Occurrences:   1     Order Specific Question:   Diet:     Answer:   Renal [8]     Physical Exam   Constitutional: He appears well-developed.   HENT:   Head: Normocephalic.   Eyes: Conjunctivae are normal.   Cardiovascular: Normal rate.  Exam reveals no gallop.    Pulmonary/Chest: No respiratory distress. He has no wheezes. He has no rales.   Abdominal: He exhibits no distension. There is tenderness. There is no rebound and no guarding.   Neurological: He is alert.   Confusion improved       Recent Labs      03/20/18   0246   03/21/18   0210   03/22/18   0023  03/22/18   0330  03/22/18   0808  03/22/18   1319   WBC  5.6   --   4.9   --   4.8   --    --    --    RBC  1.98*   --   2.33*   --   2.23*   --    --    --    HEMOGLOBIN  6.6*   < >  7.6*   < >  7.1*  6.8*  7.0*  7.3*   HEMATOCRIT  20.5*   --   22.0*   --   21.6*   --    --    --    MCV  103.5*   --   94.4   --   96.9   --    --    --    MCH  33.3*   --   32.6   --   31.8   --    --    --    MCHC  32.2*   --   34.5   --   32.9*   --    --    --    RDW  55.5*   --   62.0*   --   64.4*   --    --    --    PLATELETCT  221   --   176   --   174   --    --    --    MPV  10.1   --   9.9   --   9.6   --    --    --     < > = values in this interval not displayed.     Recent Labs      03/21/18   0210  03/21/18   1430  03/22/18   0022   SODIUM  139  135  137   POTASSIUM  4.2  4.3  3.9   CHLORIDE  100  100  99   CO2  28  25  28   GLUCOSE  83  92  90   BUN  68*  73*  35*   CREATININE  4.83*  5.82*  3.75*   CALCIUM  8.9  8.6  8.7     Recent Labs      03/19/18   1827   APTT  29.8   INR  1.15*                  Assessment/Plan     Acute metabolic encephalopathy- (present on admission)   Assessment & Plan    - Likely due to a combination of uremia and possibly UTI  - CT head had motion artifact but otherwise negative  - He does take trazodone and Percocet at  home however per his wife he has not been misusing them recently.   - Avoid sedation for now, MRI would likely be low yield due to artifact   - Now improved after treating uremia  - Started trazodone at half dose to see if this can help prevent building up of metabolites in his body causing altered mental status again        Uncontrolled hypertension- (present on admission)   Assessment & Plan    Blood pressure now improved after initial treatment.   Resumed home BP meds        Acute blood loss anemia- (present on admission)   Assessment & Plan    On top of ESRD anemia chronic disease  - Normocytic anemia  - Transfusing to keep hemoglobin over seven  - Complaint of coffee-ground emesis and melena prior to admission  - Monitoring stools in the hospital  - Consulted GI Adamnoski  - Transfusing to keep hemoglobin above seven        Insomnia- (present on admission)   Assessment & Plan    - Started trazodone at half dose to see if this can help prevent building up of metabolites in his body causing altered mental status again        UTI (urinary tract infection)- (present on admission)   Assessment & Plan    - Culture growing out LFGNR and Morganella  - Treating with ceftriaxone for now, likely DC soon        Esophagitis- (present on admission)   Assessment & Plan    - Seen on EGD  - PPI per GI        Gastric polyps- (present on admission)   Assessment & Plan    - Seen on EGD  - Path pending        Uremia- (present on admission)   Assessment & Plan    - Due to GI bleed and ESRD  - Consulted nephro Nylk  -Improving with dialysis        Elevated troponin- (present on admission)   Assessment & Plan    - Likely due to ESRD  - Stable trend        ESRD (end stage renal disease) on dialysis (CMS-HCC)- (present on admission)   Assessment & Plan    On hemodialysis Monday Wednesday Friday  - Consulted nephro Nylk        COPD exacerbation (CMS-HCC)- (present on admission)   Assessment & Plan    Not in acute exacerbation. We'll  continue medications and breathing treatments when necessary.          Quality-Core Measures   Reviewed items::  Labs reviewed and Medications reviewed  Poon catheter::  No Poon  DVT prophylaxis pharmacological::  Contraindicated - Anemia requiring blood transfusion and Contraindicated - High bleeding risk

## 2018-03-22 NOTE — CARE PLAN
Problem: Knowledge Deficit  Goal: Knowledge of disease process/condition, treatment plan, diagnostic tests, and medications will improve  Outcome: PROGRESSING AS EXPECTED  Pt becoming more alert as time goes on. He needs occasional reminders and reorientation of events of the past few days. Discussed w/him current health issues and poc as to what is being done about them. Education provided and all questions addressed. Will cont to provide education as needed.   Intervention: Explain information regarding disease process/condition, treatment plan, diagnostic tests, and medications and document in education  Done.

## 2018-03-22 NOTE — PROGRESS NOTES
Nephrology/Hemodialysis note    Patient seen and examined during dialysis -sedated after EGD  VS stable  Laboratory results reviewed  Please see dialysis flow sheet for details

## 2018-03-22 NOTE — PROGRESS NOTES
"@2300  Into pt room to assess. He is awake and alert at this time, remains slightly confused about events leading up and during hospital stay. He is eating Jello that was left on bedside table and is requesting more. He states \"i'd like something more than Jello at this time if possible.\" Will double check order. He denies pain, Cp, SOB, N, V.    @2330 Per report, pt's diet is clear and ADAT. This RN only sees the clear liquid order, but does see in the GI report that pt is ok to ADAT. Called hospitalist to clarify--order received for Renal diet. Pt provided w/pasts. He denies further needs or complaints. Call light and belongings w/in reach. Bed alarm on. Will cont to monitor.   "

## 2018-03-22 NOTE — CARE PLAN
Problem: Communication  Goal: The ability to communicate needs accurately and effectively will improve    Intervention: [place] patient and significant other/support system to call light to alert staff of needs  Patient and family educate them about plan of care and today's goals they agree and understand.       Problem: Safety  Goal: Will remain free from falls    Intervention: Assess risk factors for falls  Patient educated to use the call light, bed alarm on, since he has been confused.

## 2018-03-22 NOTE — PROGRESS NOTES
Received report from night shift RN (Aviva). Discussed plan of care, assumed care of patient.  Safety measures in place.

## 2018-03-22 NOTE — PROGRESS NOTES
"Pt awake again at this time, sitting at bedside. Addressed needs--he denies pain, Sp, SOB, N, V, and states \"i'm just restless\". Assisted pt w/ambulating to BR w/one person SBA. Gait is steady, he denies feeling dizzy or lightheaded. Instructed him not to flush the toilet and to pull chord when ready to get back to bed. He v/u. Will remain outside of door until done.  "

## 2018-03-22 NOTE — PROGRESS NOTES
IV beeping at this time. Into room and pt is awake and talking to family. Dialysis being performed. Fixed IV pump and pt assessment performed. Orientation is greatly improving and he is much more alert, but slightly lethargic. He denies pain, Cp, SOB, N, V, would just like to get out of bed at this time. Instructed him that has to wait until after dialysis. He v/u. Reviewed poc and medications w/pt and family. All questions addressed. No further needs stated or noted. Will cont to monitor.

## 2018-03-22 NOTE — PROGRESS NOTES
Bedside report given to Melody Simon. Pt in bed in poc, watching tv. All questions addressed and poc discussed. He is requesting juanjose montenegro--will bring some in. Cares turned over at this time.

## 2018-03-22 NOTE — PROGRESS NOTES
Telemetry Summary     Rhythm: Sinus Rhythm w/BBB  Rate: 70's-80's  Ectopy: F-PAC, R-PVC  Pr: 0.18  QRS: 0.14  Qt: 0.44

## 2018-03-22 NOTE — DISCHARGE PLANNING
Care Transition Team Assessment  Pt stated that he resides with his spouse, Yaquelin.  Pt reports that he does not use any DME and was not on service with HH.  Per IDT rounds, pt had an EGD done yesterday.  There are no SS needs identified at this time.     Information Source  Orientation : Disoriented to Event  Information Given By: Patient  Who is responsible for making decisions for patient? : Patient    Readmission Evaluation  Is this a readmission?: No    Elopement Risk  Legal Hold: No  Ambulatory or Self Mobile in Wheelchair: No-Not an Elopement Risk  Elopement Risk: Not at Risk for Elopement    Interdisciplinary Discharge Planning  Does Admitting Nurse Feel This Could be a Complex Discharge?: No  Lives with - Patient's Self Care Capacity: Spouse  Patient or legal guardian wants to designate a caregiver (see row info): No  Support Systems: Spouse / Significant Other, Family Member(s)  Housing / Facility: [address] [place] [address]  Do You Take your Prescribed Medications Regularly: Yes  Able to Return to Previous ADL's: Yes  Mobility Issues: No  Prior Services: None  Patient Expects to be Discharged to:: 3/23  Assistance Needed: Yes  Durable Medical Equipment: Not Applicable    Discharge Preparedness  What is your plan after discharge?: Home with help  What are your discharge supports?: Spouse  Prior Functional Level: Ambulatory    Functional Assesment  Prior Functional Level: Ambulatory    Values / Beliefs / Concerns  Values / Beliefs Concerns : No    Domestic Abuse  Have you ever been the victim of abuse or violence?: No  Physical Abuse or Sexual Abuse: No  Verbal Abuse or Emotional Abuse: No  Possible Abuse Reported to:: Not Applicable    Discharge Risks or Barriers  Discharge risks or barriers?: No    Anticipated Discharge Information  Anticipated discharge disposition: Home

## 2018-03-22 NOTE — PROGRESS NOTES
Bedside report received from Melody Guerrero. Pt slightly more alert at this time. Family at bedside--questions answered and poc discussed. No immediate needs identified. Will return for assessment.

## 2018-03-22 NOTE — PROGRESS NOTES
Pt uses call light and is found to be sitting at edge of bed. Bed alarm is on, but did not activate. He is asking for more Jello at this time. Jello provided. Call light and belongings w/in reach. Instructed pt not to move further out of bed, but to use assistance if he wants additional activity. He v/u and denies further needs or compliants. Will cont to monitor.

## 2018-03-22 NOTE — PROGRESS NOTES
[id number]    Dialysis complete at this time. Report received from Dialysis RN (anthony). Pt states he would like to get up and walk. This RN and ROCHELLE Jorgensen both discussed w/pt that his body is tired after dialysis and that he needs to rest. Pt is adamant that he would like to get up. Compromised to allow pt to sit at bedside. He sits up to edge of bed w/one person assist. Family at bedside, rubbing back and applying lotion. Pt states he has to urinate. He stands at bedside w/assist from his son and uses urinal, then sits back on edge of bed. Ayla activity well w/some generalized weakness. Supp O2 removed for RA trial to assess need for continued O2. Evening meds administered as ordered--see MAR. Held evening BP meds d/t decrease in BP during dialysis. Provided pt w/mouthwash to swish and spit, as well as chapstick. Family assisted pt back to bed to poc w/HOB slightly elevated. Call light and belongings w/in reach. Bed alarm on. Will cont to monitor.

## 2018-03-22 NOTE — THERAPY
"Occupational Therapy Evaluation completed.   Functional Status:  A&Ox3. Tolerates UIC most of the day. Performs ADL transfers with Sup. Toileting, grooming at sink with Sup. UB sponge bath with SBA. Shaggy with donning socks (wife helps at home). Educ re: possible AE. Walks ~75' with Sup/SBA, no AD.    Plan of Care: Patient with no further skilled OT needs in the acute care setting at this time  Discharge Recommendations:  Equipment: Grab Bars in tub area.  Currently anticipate no further skilled therapy needs once patient is discharged from the inpatient setting. Lives with spouse. 2 sons in town.   See \"Rehab Therapy-Acute\" Patient Summary Report for complete documentation.    "

## 2018-03-22 NOTE — PROGRESS NOTES
Pt sitting up in cardiac chair. States he had another black, tarry stool. New bag of Protonix gtt hung. Call light and belongings w/in reach. Phillip montenegro provided. No further needs identified. Will report to oncoming shift.

## 2018-03-22 NOTE — PROGRESS NOTES
[facility] Hospitalist Progress Note    Date of Service: 3/21/2018    Chief Complaint  77 y.o. male admitted 3/19/2018 with anemia and uremia causing confusion    Interval Problem Update  3/20: BUN increased to greater than 100. Hemoglobin dropped to 5.6, transfused one unit. Resume home Percocet plus breakthrough IV morphine. Consulted GI Mayuri and nephro Charlie.  3/21: EGD done today showed esophagitis. PPI per GI. Urine culture grew LFGNR, started on ceftriaxone. BUN improved to 70s with dialysis yesterday and another dialysis will be done today. Hemoglobin stabilized in the 7-8 range.    Disposition  Pending improvement of uremia causing confusion     Review of Systems   Unable to perform ROS: Medical condition      Physical Exam  Laboratory/Imaging   Hemodynamics  Temp (24hrs), Av.4 °C (97.5 °F), Min:36 °C (96.8 °F), Max:36.8 °C (98.2 °F)   Temperature: 36.7 °C (98.1 °F)  Pulse  Av.7  Min: 65  Max: 111 Heart Rate (Monitored): 74  Blood Pressure : 149/57      Respiratory      Respiration: 20, Pulse Oximetry: 100 %        RUL Breath Sounds: Diminished;Clear, RML Breath Sounds: Clear;Diminished, RLL Breath Sounds: Clear;Diminished, SILVANA Breath Sounds: Clear;Diminished, LLL Breath Sounds: Clear;Diminished    Fluids    Intake/Output Summary (Last 24 hours) at 18 1733  Last data filed at 18 1551   Gross per 24 hour   Intake             2113 ml   Output             2700 ml   Net             -587 ml       Nutrition  Orders Placed This Encounter   Procedures   • DIET ORDER     Standing Status:   Standing     Number of Occurrences:   1     Order Specific Question:   Diet:     Answer:   Clear Liquid [10]     Physical Exam   Constitutional: He appears well-developed.   HENT:   Head: Normocephalic.   Cardiovascular: Normal rate.  Exam reveals no gallop.    Pulmonary/Chest: No respiratory distress. He has no wheezes.   Abdominal: He exhibits no distension. There is tenderness. There is no rebound and no  leila.   Neurological:   Sleepy       Recent Labs      03/19/18   1827  03/20/18   0246   03/21/18   0210  03/21/18   0500  03/21/18   0957  03/21/18   1435   WBC  4.2*  5.6   --   4.9   --    --    --    RBC  2.53*  1.98*   --   2.33*   --    --    --    HEMOGLOBIN  8.3*  6.6*   < >  7.6*  7.6*  7.3*  7.2*   HEMATOCRIT  24.7*  20.5*   --   22.0*   --    --    --    MCV  97.6  103.5*   --   94.4   --    --    --    MCH  32.8  33.3*   --   32.6   --    --    --    MCHC  33.6*  32.2*   --   34.5   --    --    --    RDW  51.3*  55.5*   --   62.0*   --    --    --    PLATELETCT  222  221   --   176   --    --    --    MPV  9.6  10.1   --   9.9   --    --    --     < > = values in this interval not displayed.     Recent Labs      03/20/18   0246  03/21/18   0210  03/21/18   1430   SODIUM  135  139  135   POTASSIUM  5.6*  4.2  4.3   CHLORIDE  96  100  100   CO2  27  28  25   GLUCOSE  110*  83  92   BUN  104*  68*  73*   CREATININE  7.10*  4.83*  5.82*   CALCIUM  9.8  8.9  8.6     Recent Labs      03/19/18   1827   APTT  29.8   INR  1.15*                  Assessment/Plan     Acute metabolic encephalopathy- (present on admission)   Assessment & Plan    - Likely due to a combination of uremia and possibly UTI  - CT head had motion artifact but otherwise negative  - He does take trazodone and Percocet at home however per his wife he has not been misusing them recently.   - Avoid sedation for now, MRI would likely be low yield due to artifact   - Working on treating uremia        Uncontrolled hypertension- (present on admission)   Assessment & Plan    Blood pressure now improved after initial treatment. Will place patient on IV when necessary blood pressure medications and restart home blood pressure medications. Family is not certain of all the patient's medication, they will bring the list from home, will reconcile when we have the list.        Acute blood loss anemia- (present on admission)   Assessment & Plan    On top  of ESRD anemia chronic disease  - Normocytic anemia  - Transfusing to keep hemoglobin over seven  - Complaint of coffee-ground emesis and melena prior to admission, none since  - Consulted GI Pezanoski        Esophagitis- (present on admission)   Assessment & Plan    - Seen on EGD  - PPI per GI        Gastric polyps- (present on admission)   Assessment & Plan    - Seen on EGD  - Path pending        Uremia- (present on admission)   Assessment & Plan    - Due to GI bleed and ESRD  - Consulted nephalexis Guerra  - This is likely the cause of patient's confusion        Elevated troponin- (present on admission)   Assessment & Plan    - Likely due to ESRD  - Stable trend        ESRD (end stage renal disease) on dialysis (CMS-HCC)- (present on admission)   Assessment & Plan    On hemodialysis Monday Wednesday Friday  - Consulted nephalexis Guerra        COPD exacerbation (CMS-HCC)- (present on admission)   Assessment & Plan    Not in acute exacerbation. We'll continue medications and breathing treatments when necessary.          Quality-Core Measures   Reviewed items::  Labs reviewed and Medications reviewed  Poon catheter::  No Poon  DVT prophylaxis pharmacological::  Contraindicated - Anemia requiring blood transfusion and Contraindicated - High bleeding risk

## 2018-03-22 NOTE — PROGRESS NOTES
Pt had a black, tarry Bm. Assisted him into cardiac chair, reclined. Call light and belongings w/in reach. Ice chips provided. He denies further needs. Will cont to monitor.

## 2018-03-22 NOTE — PROGRESS NOTES
Pt resting in poc in bed, right side w/HOB elevated. Eyes closed, resprs even & unlabored, NAD noted. Call light and belongings w/in reach. Bed alarm on. Lights dimmed. Will allow for rest and cont to monitor.

## 2018-03-22 NOTE — PROGRESS NOTES
Gastroenterology Progress Note     Author: Enrique Hoskinsnathanielgunnar   Date & Time Created: 3/22/2018 1:57 PM    Interval History:  No issues overnight. Pt had 3 BM's, all dark stools. Pt denies fever, chills, N/V, abdominal pain, chest pain, SOB.     Chief Complaint:  GI bleed     Review of Systems:  ROS    Physical Exam:  Physical Exam   Constitutional: He is oriented to person, place, and time. He appears well-developed and well-nourished.   HENT:   Head: Normocephalic and atraumatic.   Eyes: Conjunctivae are normal. Pupils are equal, round, and reactive to light. No scleral icterus.   Neck: Neck supple.   Cardiovascular: Normal rate and regular rhythm.    Pulmonary/Chest: Effort normal and breath sounds normal. No respiratory distress.   Abdominal: Soft. Bowel sounds are normal. He exhibits no distension. There is no tenderness. There is no rebound and no guarding.   Musculoskeletal: Normal range of motion. He exhibits no edema.   Neurological: He is alert and oriented to person, place, and time.   Skin: Skin is warm and dry.   Psychiatric: He has a normal mood and affect. His behavior is normal.   Pt much improved today, conversing normally & no confusion. AAOx3.        Labs:        Invalid input(s): [id number]  Recent Labs      03/19/18   1827  03/20/18   0246  03/20/18   0751   TROPONINI  0.09*  0.12*  0.10*     Recent Labs      03/21/18   0210  03/21/18   1430  03/22/18   0022   SODIUM  139  135  137   POTASSIUM  4.2  4.3  3.9   CHLORIDE  100  100  99   CO2  28  25  28   BUN  68*  73*  35*   CREATININE  4.83*  5.82*  3.75*   CALCIUM  8.9  8.6  8.7     Recent Labs      03/21/18   0210  03/21/18   1430  03/22/18   0022   ALTSGPT  23  22  24   ASTSGOT  41  38  52*   ALKPHOSPHAT  60  52  61   TBILIRUBIN  1.0  0.9  0.5   GLUCOSE  83  92  90     Recent Labs      03/19/18   1827  03/19/18   2133  03/20/18   0246   03/21/18   0210   03/22/18   0023  03/22/18   0330  03/22/18   0808  03/22/18   1319   RBC  2.53*   --   1.98*    --   2.33*   --   2.23*   --    --    --    HEMOGLOBIN  8.3*   --   6.6*   < >  7.6*   < >  7.1*  6.8*  7.0*  7.3*   HEMATOCRIT  24.7*   --   20.5*   --   22.0*   --   21.6*   --    --    --    PLATELETCT  222   --   221   --   176   --   174   --    --    --    PROTHROMBTM  14.6   --    --    --    --    --    --    --    --    --    APTT  29.8   --    --    --    --    --    --    --    --    --    INR  1.15*   --    --    --    --    --    --    --    --    --    IRON   --   168   --    --    --    --    --    --    --    --    FERRITIN   --   216.2   --    --    --    --    --    --    --    --    TOTIRONBC   --   290   --    --    --    --    --    --    --    --     < > = values in this interval not displayed.     Recent Labs      18   1827  18   0246  18   0210  18   1430  18   0022  18   0023   WBC  4.2*  5.6  4.9   --    --   4.8   NEUTSPOLYS  78.00*  73.90*   --    --    --    --    LYMPHOCYTES  11.50*  14.20*   --    --    --    --    MONOCYTES  7.40  8.80   --    --    --    --    EOSINOPHILS  2.40  2.20   --    --    --    --    BASOPHILS  0.50  0.50   --    --    --    --    ASTSGOT  23  27  41  38  52*   --    ALTSGPT  22  20  23  22  24   --    ALKPHOSPHAT  85  70  60  52  61   --    TBILIRUBIN  0.9  0.6  1.0  0.9  0.5   --      Hemodynamics:  Temp (24hrs), Av.4 °C (97.6 °F), Min:36 °C (96.8 °F), Max:37.1 °C (98.8 °F)  Temperature: 36.6 °C (97.9 °F)  Pulse  Av.4  Min: 64  Max: 111Heart Rate (Monitored): 74  Blood Pressure : 122/40 (Rn notified)     Respiratory:    Respiration: 18, Pulse Oximetry: 93 %        RUL Breath Sounds: Clear, RML Breath Sounds: Clear;Diminished, RLL Breath Sounds: Diminished, SILVANA Breath Sounds: Clear, LLL Breath Sounds: Clear;Diminished  Fluids:    Intake/Output Summary (Last 24 hours) at 18 1357  Last data filed at 18 0900   Gross per 24 hour   Intake             1165 ml   Output             2050 ml   Net              -885 ml        GI/Nutrition:  Orders Placed This Encounter   Procedures   • DIET ORDER     Standing Status:   Standing     Number of Occurrences:   1     Order Specific Question:   Diet:     Answer:   Renal [8]     Medical Decision Making, by Problem:  Active Hospital Problems    Diagnosis   • Acute metabolic encephalopathy [G93.41]   • Uncontrolled hypertension [I10]   • Acute blood loss anemia [D62]   • Gastric polyps [K31.7]   • Esophagitis [K20.9]   • Elevated troponin [R74.8]   • Uremia [N19]   • ESRD (end stage renal disease) on dialysis (CMS-HCC) [N18.6, Z99.2]   • COPD exacerbation (CMS-HCC) [J44.1]       Plan:  1. GI bleed - Likely secondary to esophagitis, however no obvious peptic ulcers. If patient rebleeds, will do push enteroscopy to evaluate further in duodenum/jejunum. H/H stable. May consider 1 unit PRBC's as Hb near 7. Continue PPI - once daily okay, orally. Expect black stools to clear up over the next 1-3 days progressively becoming more normal in color as the old blood is cleared. Call if significant drop in H/H or worsening symptoms of bleeding. Avoid all NSAIDs.   2. Anemia - likely multifactorial, with above   3. CKD - Pt on HD   4. AMS - mentally clear today, much improved since yesterday.     Quality-Core Measures

## 2018-03-22 NOTE — CARE PLAN
Problem: Safety  Goal: Will remain free from falls  Outcome: PROGRESSING AS EXPECTED  No falls so far during hospital stay. Hourly rounding. Bed alarm on. Non-skid footwear. Clutter-free environment. Assist w/all activity. Call light and belongings w/in reach--continuously reinforce call light use to pt.

## 2018-03-22 NOTE — THERAPY
"Physical Therapy Evaluation completed.   Bed Mobility:  Supine to Sit: Modified Independent  Transfers: Sit to Stand: Independent  Gait: Level Of Assist: Stand by Assist with No Equipment Needed       Plan of Care: Patient with no further skilled PT needs in the acute care setting at this time  Discharge Recommendations: Equipment: No Equipment Needed. Post-acute therapy Currently anticipate no further skilled therapy needs once patient is discharged from the inpatient setting.    See \"Rehab Therapy-Acute\" Patient Summary Report for complete documentation.   Pt is a 77 y,o.m. who presented to hospital with AMS.  Pt currently alert and oriented and able to follow all commands.  Pt lives with his wife and was indep with all mobility w/o AD.  The pt demonstrated safe gait on level surfaces w/o AD.  The pt has no c/o pain.  The pt demonstrated good safety judgement and indicated he feels he is back to his baseline.  Pt has no further skilled PT needs in acute care setting.   "

## 2018-03-22 NOTE — PROGRESS NOTES
Pt sitting up in bed, watching tv. Resprs even & unlabored, NAD noted. He has been making phone calls to family. Pt has eaten two Jellos and two microwave pastas. He cont to deny needs or complaints. Call light and belongings w/in reach. Instructed pt on use of call light w/all activity. He v/u. Bed alarm on. Will cont to monitor.

## 2018-03-22 NOTE — PROGRESS NOTES
Nephrologist ordered 3 hours  hd tx. Initiated at 1707 and completed at 2008 .  Net uf 1500 ml. Tolerated HD.See flow sheet for details.  Report given to Jeffrey BYRD.  Nurse time 30 minutes. Patient was in endoscopy procedure , I waited 30 minutes before setting up HD machine in Patient room 314-2, per primary RN.

## 2018-03-22 NOTE — PROGRESS NOTES
EKG Rhythm Strip    UT Interval: 0.20  QRS Interval: 0.16  QT Interval: 0.44  Rhythm Interpretation: ST,SR.    Ectopy: F-PAC, R-PVC.

## 2018-03-23 ENCOUNTER — PATIENT OUTREACH (OUTPATIENT)
Dept: HEALTH INFORMATION MANAGEMENT | Facility: OTHER | Age: 78
End: 2018-03-23

## 2018-03-23 VITALS
OXYGEN SATURATION: 95 % | SYSTOLIC BLOOD PRESSURE: 159 MMHG | RESPIRATION RATE: 18 BRPM | BODY MASS INDEX: 24.26 KG/M2 | HEART RATE: 70 BPM | HEIGHT: 68 IN | TEMPERATURE: 97.9 F | DIASTOLIC BLOOD PRESSURE: 58 MMHG | WEIGHT: 160.05 LBS

## 2018-03-23 LAB
ALBUMIN SERPL BCP-MCNC: 2.8 G/DL (ref 3.2–4.9)
ALBUMIN/GLOB SERPL: 1.1 G/DL
ALP SERPL-CCNC: 74 U/L (ref 30–99)
ALT SERPL-CCNC: 28 U/L (ref 2–50)
ANION GAP SERPL CALC-SCNC: 9 MMOL/L (ref 0–11.9)
AST SERPL-CCNC: 38 U/L (ref 12–45)
BACTERIA UR CULT: ABNORMAL
BILIRUB SERPL-MCNC: 0.7 MG/DL (ref 0.1–1.5)
BUN SERPL-MCNC: 45 MG/DL (ref 8–22)
CALCIUM SERPL-MCNC: 9.1 MG/DL (ref 8.4–10.2)
CHLORIDE SERPL-SCNC: 103 MMOL/L (ref 96–112)
CO2 SERPL-SCNC: 27 MMOL/L (ref 20–33)
CREAT SERPL-MCNC: 6.11 MG/DL (ref 0.5–1.4)
ERYTHROCYTE [DISTWIDTH] IN BLOOD BY AUTOMATED COUNT: 64.1 FL (ref 35.9–50)
GLOBULIN SER CALC-MCNC: 2.5 G/DL (ref 1.9–3.5)
GLUCOSE SERPL-MCNC: 108 MG/DL (ref 65–99)
HCT VFR BLD AUTO: 24.1 % (ref 42–52)
HGB BLD-MCNC: 7.4 G/DL (ref 14–18)
HGB BLD-MCNC: 7.9 G/DL (ref 14–18)
HGB BLD-MCNC: 8.8 G/DL (ref 14–18)
MCH RBC QN AUTO: 30.9 PG (ref 27–33)
MCHC RBC AUTO-ENTMCNC: 32.8 G/DL (ref 33.7–35.3)
MCV RBC AUTO: 94.1 FL (ref 81.4–97.8)
PLATELET # BLD AUTO: 158 K/UL (ref 164–446)
PMV BLD AUTO: 9.4 FL (ref 9–12.9)
POTASSIUM SERPL-SCNC: 3.9 MMOL/L (ref 3.6–5.5)
PROT SERPL-MCNC: 5.3 G/DL (ref 6–8.2)
RBC # BLD AUTO: 2.56 M/UL (ref 4.7–6.1)
SIGNIFICANT IND 70042: ABNORMAL
SITE SITE: ABNORMAL
SODIUM SERPL-SCNC: 139 MMOL/L (ref 135–145)
SOURCE SOURCE: ABNORMAL
WBC # BLD AUTO: 4.4 K/UL (ref 4.8–10.8)

## 2018-03-23 PROCEDURE — 90935 HEMODIALYSIS ONE EVALUATION: CPT

## 2018-03-23 PROCEDURE — 700111 HCHG RX REV CODE 636 W/ 250 OVERRIDE (IP): Performed by: HOSPITALIST

## 2018-03-23 PROCEDURE — 700101 HCHG RX REV CODE 250: Performed by: HOSPITALIST

## 2018-03-23 PROCEDURE — A9270 NON-COVERED ITEM OR SERVICE: HCPCS | Performed by: STUDENT IN AN ORGANIZED HEALTH CARE EDUCATION/TRAINING PROGRAM

## 2018-03-23 PROCEDURE — 700102 HCHG RX REV CODE 250 W/ 637 OVERRIDE(OP): Performed by: STUDENT IN AN ORGANIZED HEALTH CARE EDUCATION/TRAINING PROGRAM

## 2018-03-23 PROCEDURE — 80053 COMPREHEN METABOLIC PANEL: CPT

## 2018-03-23 PROCEDURE — 85018 HEMOGLOBIN: CPT | Mod: 91

## 2018-03-23 PROCEDURE — 99239 HOSP IP/OBS DSCHRG MGMT >30: CPT | Performed by: INTERNAL MEDICINE

## 2018-03-23 PROCEDURE — 85027 COMPLETE CBC AUTOMATED: CPT

## 2018-03-23 PROCEDURE — A9270 NON-COVERED ITEM OR SERVICE: HCPCS | Performed by: HOSPITALIST

## 2018-03-23 PROCEDURE — 700102 HCHG RX REV CODE 250 W/ 637 OVERRIDE(OP): Performed by: HOSPITALIST

## 2018-03-23 RX ORDER — CIPROFLOXACIN 500 MG/1
500 TABLET, FILM COATED ORAL 2 TIMES DAILY
Qty: 6 TAB | Refills: 0 | Status: SHIPPED | OUTPATIENT
Start: 2018-03-23 | End: 2018-06-12

## 2018-03-23 RX ADMIN — AMLODIPINE BESYLATE 10 MG: 5 TABLET ORAL at 13:21

## 2018-03-23 RX ADMIN — FUROSEMIDE 40 MG: 40 TABLET ORAL at 13:20

## 2018-03-23 RX ADMIN — CLONIDINE HYDROCHLORIDE 0.2 MG: 0.1 TABLET ORAL at 13:22

## 2018-03-23 RX ADMIN — WATER 1 G: 1000 INJECTION, SOLUTION INTRAVENOUS at 13:24

## 2018-03-23 RX ADMIN — BUDESONIDE AND FORMOTEROL FUMARATE DIHYDRATE 2 PUFF: 160; 4.5 AEROSOL RESPIRATORY (INHALATION) at 13:24

## 2018-03-23 RX ADMIN — LABETALOL HYDROCHLORIDE 300 MG: 100 TABLET, FILM COATED ORAL at 13:22

## 2018-03-23 RX ADMIN — OMEPRAZOLE 40 MG: 20 CAPSULE, DELAYED RELEASE ORAL at 13:23

## 2018-03-23 RX ADMIN — LISINOPRIL 40 MG: 20 TABLET ORAL at 13:21

## 2018-03-23 RX ADMIN — DOXAZOSIN 4 MG: 2 TABLET ORAL at 13:22

## 2018-03-23 RX ADMIN — LORAZEPAM 1 MG: 2 INJECTION INTRAMUSCULAR; INTRAVENOUS at 01:12

## 2018-03-23 ASSESSMENT — ENCOUNTER SYMPTOMS
HEADACHES: 0
NERVOUS/ANXIOUS: 1
ORTHOPNEA: 0
MYALGIAS: 0
DIARRHEA: 0
DIZZINESS: 0
FOCAL WEAKNESS: 0
PALPITATIONS: 0
VOMITING: 0
SHORTNESS OF BREATH: 0
NAUSEA: 0
BACK PAIN: 0
SPEECH CHANGE: 0
WHEEZING: 0
EYES NEGATIVE: 1
COUGH: 0
SENSORY CHANGE: 0
ABDOMINAL PAIN: 0
HEMOPTYSIS: 0
CHILLS: 0
FEVER: 0

## 2018-03-23 ASSESSMENT — PAIN SCALES - GENERAL: PAINLEVEL_OUTOF10: 0

## 2018-03-23 ASSESSMENT — LIFESTYLE VARIABLES: EVER_SMOKED: YES

## 2018-03-23 NOTE — CARE PLAN
Problem: Safety  Goal: Will remain free from falls  Outcome: PROGRESSING AS EXPECTED  No falls so far during hospitalization. Pt strength increasing each day and pt connected to fewer lines. Deemed pt safe to ambulate throughout room independently. Instructed pt on use of call light if he ever does feel weak or unsteady. Non-skid footwear. Clutter-free environment. Hourly rounding.

## 2018-03-23 NOTE — PROGRESS NOTES
Pt resting quietly in poc on right side, HOB sligthly elevated. Eyes closed, resprs even & unlabored, NAD noted. Call light and belongings w/in reach. Will allow for rest and cont to monitor.

## 2018-03-23 NOTE — PROGRESS NOTES
0700 Report received from night shift ROCHELLE Chicas. POC discussed. Safety precautions in place. ROCHELLE Arnold starting dialysis.    0800 Patient resting comfortably in bed, sleeping. Receiving dialysis with Clayton BYRD from [facility].    1310 Medications administered which were previously held during dialysis. Wife at bedside, updated on POC.    9651 Discharge instructions discussed with patient and wife. All questions answered. Patient left floor via wheelchair with RN escort.

## 2018-03-23 NOTE — ASSESSMENT & PLAN NOTE
- Started trazodone at half dose to see if this can help prevent building up of metabolites in his body causing altered mental status again

## 2018-03-23 NOTE — PROGRESS NOTES
Called Dialysis, they informed us that patient will dialyze tomorrow in the morning. Patient and family notified.

## 2018-03-23 NOTE — DISCHARGE SUMMARY
CHIEF COMPLAINT ON ADMISSION  Chief Complaint   Patient presents with   • ALOC       CODE STATUS  Full Code    HPI & HOSPITAL COURSE  This is a 77 y.o. male who was admitted on 3/23/18 after presenting to ER for encephalopathy. There was also concern for GI bleed. Pt was found to have BUN of 110, and his hemoglobin while here dropped to 5.6 requiring 1 unit transfusion. Pt also has history of ESRD. Nephrology and GI were consulted. Pt was started on dialysis, and GI also saw pt and did a EGD on 3/21/18, showing gastric polyps and also esophagitis,  Was continued on PPI. His symptoms improved, his mentation resolved after dialysis.   His hemoglobin has jennifer stable.  Per nephrology and GI standpoint can be discharged.   He also had UTI , and was startd on abx, he will discharged home on cipro.   Pt will be discharged home today, and will need to follow as outpt with GI and nephrology.       The patient met 2-midnight criteria for an inpatient stay at the time of discharge.    DISCHARGE PROBLEM LIST  Active Problems:    Uncontrolled hypertension POA: Yes    Acute metabolic encephalopathy POA: Yes    Acute blood loss anemia POA: Yes    COPD exacerbation (CMS-[facility]) POA: Yes    ESRD (end stage renal disease) on dialysis (CMS-[facility]) POA: Yes    Elevated troponin POA: Yes    Uremia POA: Yes    Gastric polyps POA: Yes    Esophagitis POA: Yes    UTI (urinary tract infection) POA: Yes    Insomnia POA: Yes        FOLLOW UP  Future Appointments  Date Time Provider Department Center   3/27/2018 11:00 AM CARE MANAGER [facility]   3/28/2018 3:00 PM ILAN Snyder [facility]   4/20/2018 1:30 PM Long Larson M.D. NEPH [address] [place] [address]   4/26/2018 3:00 PM Martha Light M.D. [facility]   5/15/2018 2:00 PM PFT-RM1 PULM None   5/15/2018 3:20 PM ILAN Valdes PULM None   8/7/2018 1:15 PM ECHO [facility]   8/14/2018 1:15 PM James Mendoza M.D. [facility] None     Enrique Child D.O.  [address]  [address]  [phone]      Office will be calling to schedule appt; need to get records from [facility] before they will schedule      MEDICATIONS ON DISCHARGE   Parmjit Castelan   Home Medication Instructions [facility]:[id number]    Printed on:03/23/18 9695   Medication Information                      allopurinol (ZYLOPRIM) 100 MG Tab  Take 100 mg by mouth every day. Unknown dosage             ALPRAZolam (XANAX) 0.5 MG Tab  Take 0.5 mg by mouth 3 times a day as needed.             amlodipine (NORVASC) 10 MG Tab  TAKE ONE TABLET BY MOUTH DAILY             atorvastatin (LIPITOR) 10 MG Tab  Take 10 mg by mouth every evening.             B Complex-C-Folic Acid (DIALYVITE 800 PO)  Take 1 Tab by mouth every day.             Calcium Acetate, Phos Binder, 667 MG Cap  TAKE TWO CAPSULES BY MOUTH THREE TIMES A DAY WITH A MEAL             ciprofloxacin (CIPRO) 500 MG Tab  Take 1 Tab by mouth 2 times a day.             doxazosin (CARDURA) 4 MG Tab  TAKE ONE TABLET BY MOUTH DAILY             furosemide (LASIX) 40 MG Tab  Take 40 mg by mouth 2 Times a Day.             irbesartan (AVAPRO) 300 MG Tab  Take 300 mg by mouth every evening.             labetalol (NORMODYNE) 300 MG Tab  Take 300 mg by mouth 2 times a day.             lisinopril (PRINIVIL, ZESTRIL) 40 MG tablet  Take 40 mg by mouth 2 times a day.             omeprazole (PRILOSEC) 40 MG delayed-release capsule  Take 80 mg by mouth every day.             ondansetron (ZOFRAN ODT) 4 MG TABLET DISPERSIBLE  Take 4 mg by mouth every 6 hours as needed for Nausea.             oxyCODONE-acetaminophen (PERCOCET) 5-325 MG Tab  Take 1-2 Tabs by mouth every day.             pravastatin (PRAVACHOL) 20 MG Tab  TAKE ONE TABLET BY MOUTH DAILY             ROPINIRole (REQUIP) 0.25 MG Tab  Take 0.25 mg by mouth every bedtime.             tamsulosin (FLOMAX) 0.4 MG capsule  Take 0.4 mg by mouth every evening.             trazodone (DESYREL) 150 MG Tab  TAKE TWO TABLETS BY MOUTH EVERY NIGHT AT BEDTIME  (GENERIC FOR DESYREL)                 DIET  Orders Placed This Encounter   Procedures   • DIET ORDER     Standing Status:   Standing     Number of Occurrences:   1     Order Specific Question:   Diet:     Answer:   Renal [8]       ACTIVITY  As tolerated.      CONSULTATIONS  GI: Dr. Child  Nephrology: Dr. Guerra      PROCEDURES  EGD     LABORATORY  Lab Results   Component Value Date/Time    SODIUM 139 03/23/2018 12:58 AM    POTASSIUM 3.9 03/23/2018 12:58 AM    CHLORIDE 103 03/23/2018 12:58 AM    CO2 27 03/23/2018 12:58 AM    GLUCOSE 108 (H) 03/23/2018 12:58 AM    BUN 45 (H) 03/23/2018 12:58 AM    CREATININE 6.11 (HH) 03/23/2018 12:58 AM    CREATININE 2.1 (H) 05/06/2009 10:08 AM        Lab Results   Component Value Date/Time    WBC 4.4 (L) 03/23/2018 12:59 AM    HEMOGLOBIN 8.8 (L) 03/23/2018 09:32 AM    HEMATOCRIT 24.1 (L) 03/23/2018 12:59 AM    PLATELETCT 158 (L) 03/23/2018 12:59 AM        Total time of the discharge process exceeds 40 minutes

## 2018-03-23 NOTE — PROGRESS NOTES
HD Tx per Dr. ZACH Guerra x 3 hrs., initiated at 0712, ended at 1012, net UF 2000 ml. See paper flow-sheet for details.    Report given to SUNNY Cage RN.

## 2018-03-23 NOTE — CARE PLAN
Problem: Bowel/Gastric:  Goal: Normal bowel function is maintained or improved  Outcome: PROGRESSING AS EXPECTED  Pt now having brown Bm's. He denies abdominal pain, cramping, nausea, or emesis. Will cont to monitor. Encouraged fluid intake and ambulation.  Intervention: Educate patient and significant other/support system about diet, fluid intake, medications and activity to promote bowel function  Done    Intervention: Educate patient and significant other/support system about signs and symptoms of constipation and interventions to implement  Done

## 2018-03-23 NOTE — PROGRESS NOTES
Pt resting quietly in bed in poc. Eyes closed, resprs even & unlabored, NAD noted. Call light and belongings w/in reach. Bed alarm on. Will allow for rest and cont to monitor.

## 2018-03-23 NOTE — PROGRESS NOTES
Nephrology Progress Note, Adult, Complex               Author: Amena Charlie Date & Time created: 3/23/2018  2:48 PM     Interval History:  78 y/o male with ESRD/HD admitted with AMS, anemia  Doing much better  AAO  HD MWF  Received dialysis this morning -tolerated well  Review of Systems:  Review of Systems   Constitutional: Positive for malaise/fatigue. Negative for chills and fever.   HENT: Negative.    Eyes: Negative.    Respiratory: Negative for cough, hemoptysis, shortness of breath and wheezing.    Cardiovascular: Negative for chest pain, palpitations, orthopnea and leg swelling.   Gastrointestinal: Negative for abdominal pain, diarrhea, nausea and vomiting.   Genitourinary: Negative for dysuria.   Musculoskeletal: Negative for back pain, joint pain and myalgias.   Skin: Negative.    Neurological: Negative for dizziness, sensory change, speech change, focal weakness and headaches.   Psychiatric/Behavioral: The patient is nervous/anxious.        Physical Exam:  Physical Exam   Constitutional: He is oriented to person, place, and time. He appears well-developed and well-nourished. No distress.   HENT:   Head: Normocephalic and atraumatic.   Nose: Nose normal.   Mouth/Throat: Oropharynx is clear and moist.   Eyes: Conjunctivae and EOM are normal. Pupils are equal, round, and reactive to light. No scleral icterus.   Neck: Normal range of motion. Neck supple.   Cardiovascular: Normal rate and regular rhythm.  Exam reveals no gallop and no friction rub.    Pulmonary/Chest: Effort normal and breath sounds normal. No respiratory distress. He has no wheezes. He has no rales.   Abdominal: Soft. Bowel sounds are normal. He exhibits no distension. There is no tenderness.   Musculoskeletal: He exhibits no edema.   Neurological: He is alert and oriented to person, place, and time. No cranial nerve deficit.   Skin: Skin is warm. No rash noted. No erythema.   Nursing note and vitals reviewed.      Labs:        Invalid  input(s): [id number]      Recent Labs      18   1430  18   0022  18   SODIUM  135  137  139   POTASSIUM  4.3  3.9  3.9   CHLORIDE  100  99  103   CO2  25  28  27   BUN  73*  35*  45*   CREATININE  5.82*  3.75*  6.11*   CALCIUM  8.6  8.7  9.1     Recent Labs      18   1430  18   0022  18   005   ALTSGPT  22  24  28   ASTSGOT  38  52*  38   ALKPHOSPHAT  52  61  74   TBILIRUBIN  0.9  0.5  0.7   GLUCOSE  92  90  108*     Recent Labs      18   02118   0023   03/23/18   0059  18   0449  18   0932   RBC  2.33*   --   2.23*   --   2.56*   --    --    HEMOGLOBIN  7.6*   < >  7.1*   < >  7.9*  7.4*  8.8*   HEMATOCRIT  22.0*   --   21.6*   --   24.1*   --    --    PLATELETCT  176   --   174   --   158*   --    --     < > = values in this interval not displayed.     Recent Labs      18   0210  18   1430  18   00218   0023  03/23/18   0058  03/23/18   0059   WBC  4.9   --    --   4.8   --   4.4*   ASTSGOT  41  38  52*   --   38   --    ALTSGPT  23    24   --   28   --    ALKPHOSPHAT  60  52  61   --   74   --    TBILIRUBIN  1.0  0.9  0.5   --   0.7   --            Hemodynamics:  Temp (24hrs), Av.7 °C (98.1 °F), Min:36.6 °C (97.8 °F), Max:37.1 °C (98.7 °F)  Temperature: 36.6 °C (97.9 °F)  Pulse  Av.7  Min: 64  Max: 111   Blood Pressure : 156/46     Respiratory:    Respiration: 18, Pulse Oximetry: 95 %        RUL Breath Sounds: Clear, RML Breath Sounds: Clear, RLL Breath Sounds: Diminished, SILVANA Breath Sounds: Clear, LLL Breath Sounds: Diminished  Fluids:    Intake/Output Summary (Last 24 hours) at 18 1448  Last data filed at 18 1400   Gross per 24 hour   Intake             1370 ml   Output             2501 ml   Net            -1131 ml        GI/Nutrition:  Orders Placed This Encounter   Procedures   • DIET ORDER     Standing Status:   Standing     Number of Occurrences:   1     Order Specific Question:    Diet:     Answer:   Renal [8]     Medical Decision Making, by Problem:  Active Hospital Problems    Diagnosis   • Acute metabolic encephalopathy [G93.41]   • Uncontrolled hypertension [I10]   • Acute blood loss anemia [D62]   • Gastric polyps [K31.7]   • Esophagitis [K20.9]   • Elevated troponin [R74.8]   • Uremia [N19]   • ESRD (end stage renal disease) on dialysis (CMS-HCC) [N18.6, Z99.2]   • COPD exacerbation (CMS-HCC) [J44.1]       Quality-Core Measures   Reviewed items::  Labs reviewed and Medications reviewed    Assessment and plan  1.ESRD/HD -MWF  2.HTN: BP well controlled  3.Electrolytes: well controlled.  4.Anemia: low Hb  -scheduled blood transfusion  5.AMS -improved  6.Volume: UF with HD as BP tolerates    Recs: HD MWF, renal diet, all meds to renal doses

## 2018-03-23 NOTE — PROGRESS NOTES
Pt uses call light and states he still can't sleep. Slightly agitated at this time. Ativan administered--see MAR. Bed alarm on. Call light within reach. Will allow for rest and cont to monitor.

## 2018-03-23 NOTE — PROGRESS NOTES
Into room for pt assessment. Pt is sitting up in bed, wife at bedside. Resprs even & unlabored, NAD noted. Pt only complaint is of some arthritic pain in hands--would like pain medication w/evening meds around 2200. No Cp, SOB, N, V, abdominal pain or cramping. He reports brown stools. PRBC's transfusing w/o complications, almost complete at this time. Discussed poc and medications for the night. All questions addressed. No needs identified. Will cont to monitor.

## 2018-03-23 NOTE — PROGRESS NOTES
Telemetry Summary     Rhythm: Sinus Rhythm w/BBB  Rate: 60's-90's  Ectopy: O-PVC, F-PAC  Pr: 0.16  QRS: 0.14  Qt: 0.38

## 2018-03-23 NOTE — PROGRESS NOTES
Bedside report received from Melody Simon. All questions addressed and poc discussed. Pt lying in bed in poc--resprs even & unlabored, NAD noted. Wife at bedside. PRBC's infusing w/o complications. Call light and belongings w/in reach. No immediate needs identified. Will return for assessment.

## 2018-03-23 NOTE — PROGRESS NOTES
Gastroenterology Progress Note     Author: Enrique Child   Date & Time Created: 3/23/2018 2:35 PM    Interval History:  No problems overnight. Pt denies melanotic stools, nausea, vomiting, diarrhea, fever, chills, chest pain, SOB. Pt denies any complaints. He feels much better.     Chief Complaint:  GI bleed    Review of Systems:  ROS    Physical Exam:  Physical Exam   Constitutional: He is oriented to person, place, and time. He appears well-developed and well-nourished.   HENT:   Head: Normocephalic.   Eyes: Conjunctivae are normal. Pupils are equal, round, and reactive to light. No scleral icterus.   +pallor   Neck: Normal range of motion. Neck supple.   Cardiovascular: Normal rate, regular rhythm and normal heart sounds.    Pulmonary/Chest: Effort normal and breath sounds normal. No respiratory distress.   Abdominal: Soft. Bowel sounds are normal. He exhibits no distension. There is no tenderness. There is no rebound and no guarding.   Musculoskeletal: Normal range of motion.   Neurological: He is alert and oriented to person, place, and time.   Skin: Skin is warm and dry.   Psychiatric: He has a normal mood and affect. His behavior is normal.       Labs:        Invalid input(s): [id number]      Recent Labs      03/21/18   1430  03/22/18   0022  03/23/18   0058   SODIUM  135  137  139   POTASSIUM  4.3  3.9  3.9   CHLORIDE  100  99  103   CO2  25  28  27   BUN  73*  35*  45*   CREATININE  5.82*  3.75*  6.11*   CALCIUM  8.6  8.7  9.1     Recent Labs      03/21/18   1430  03/22/18   0022  03/23/18   0058   ALTSGPT  22  24  28   ASTSGOT  38  52*  38   ALKPHOSPHAT  52  61  74   TBILIRUBIN  0.9  0.5  0.7   GLUCOSE  92  90  108*     Recent Labs      03/21/18   0210   03/22/18   0023   03/23/18   0059  03/23/18   0449  03/23/18   0932   RBC  2.33*   --   2.23*   --   2.56*   --    --    HEMOGLOBIN  7.6*   < >  7.1*   < >  7.9*  7.4*  8.8*   HEMATOCRIT  22.0*   --   21.6*   --   24.1*   --    --    PLATELETCT  176    --   174   --   158*   --    --     < > = values in this interval not displayed.     Recent Labs      18   0210  18   1430  18   0022  18   0023  18   0058  18   0059   WBC  4.9   --    --   4.8   --   4.4*   ASTSGOT  41  38  52*   --   38   --    ALTSGPT  23  22  24   --   28   --    ALKPHOSPHAT  60  52  61   --   74   --    TBILIRUBIN  1.0  0.9  0.5   --   0.7   --      Hemodynamics:  Temp (24hrs), Av.7 °C (98.1 °F), Min:36.6 °C (97.8 °F), Max:37.1 °C (98.7 °F)  Temperature: 36.6 °C (97.9 °F)  Pulse  Av.7  Min: 64  Max: 111   Blood Pressure : 156/46     Respiratory:    Respiration: 18, Pulse Oximetry: 95 %        RUL Breath Sounds: Clear, RML Breath Sounds: Clear, RLL Breath Sounds: Diminished, SILVANA Breath Sounds: Clear, LLL Breath Sounds: Diminished  Fluids:    Intake/Output Summary (Last 24 hours) at 18 1435  Last data filed at 18 1030   Gross per 24 hour   Intake             1250 ml   Output             2501 ml   Net            -1251 ml        GI/Nutrition:  Orders Placed This Encounter   Procedures   • DIET ORDER     Standing Status:   Standing     Number of Occurrences:   1     Order Specific Question:   Diet:     Answer:   Renal [8]     Medical Decision Making, by Problem:  Active Hospital Problems    Diagnosis   • Acute metabolic encephalopathy [G93.41]   • Uncontrolled hypertension [I10]   • Acute blood loss anemia [D62]   • UTI (urinary tract infection) [N39.0]   • Insomnia [G47.00]   • Gastric polyps [K31.7]   • Esophagitis [K20.9]   • Elevated troponin [R74.8]   • Uremia [N19]   • ESRD (end stage renal disease) on dialysis (CMS-HCC) [N18.6, Z99.2]   • COPD exacerbation (CMS-HCC) [J44.1]       Plan:  1. GI bleed - Likely secondary to esophagitis, however no obvious peptic ulcers. H/H stable. No rebleeding. Continue PPI as outpatient and follow up in office 2-4 weeks. If patient rebleeds, will do push enteroscopy to evaluate further in  duodenum/jejunum.  Expect black stools to clear up over the next 1-3 days progressively becoming more normal in color as the old blood is cleared. Call if significant drop in H/H or worsening symptoms of bleeding. Avoid all NSAIDs.   2. Anemia - likely multifactorial, with above   3. CKD - Pt on HD   4. AMS - mentally clear today, much improved since 2 days ago.   5. Will sign off, clear from a GI standpoint. Follow up as outpatient. If rebleeds, go to ER.   Quality-Core Measures

## 2018-03-23 NOTE — PROGRESS NOTES
PRBC transfusion complete. Sl'd IV and instructed pt he is ok to be independent in room, but to use light if he feels weak, lightheaded, or dizzy. He v/u. No further needs stated or noted. Ice chips provided. Will cont to monitor and return w/meds.

## 2018-03-23 NOTE — PROGRESS NOTES
Clayton from [facility] here at this time to begin dialysis treatment. Assisted him w/rearranging room to fit machine inside. No further needs identified. Pt resting comfortably at this time. Will cont to monitor and report to oncoming shift.

## 2018-03-23 NOTE — PROGRESS NOTES
Report given to MELODY Herrera and Melody Zepeda. All questions addressed and poc discussed. Cares turned over at this time.

## 2018-03-23 NOTE — PROGRESS NOTES
"Into pt room at this time for evening meds per his request. Began reviewing meds w/him and he starts to get agitated. He says \"I don't take all that. What are they trying to do here.\" Discussed w/him that they were all taken from his home medication reconciliation and that the hospitalist and nephrologist deemed them safe to take at this time. He remains agitated and says \"well I'm not taking all of those.\" Reviewed each med one by one and pt stated whether or not he wanted it. See MAR for details. Morphine also administered for arthritis pain. Pt in bed in poc and repositions self as needed. Call light and belongings w/in reach. Again instructed him to use call light for activity through the night if he feels weak or dizzy, especially after med administration. He v/u. No further needs stated or noted. Lights dimmed. Will allow for rest and cont to monitor.   "

## 2018-03-23 NOTE — DISCHARGE INSTRUCTIONS
Discharge Instructions    Discharged to home by car with relative. Discharged via wheelchair, hospital escort: Yes.  Special equipment needed: Not Applicable    Be sure to schedule a follow-up appointment with your primary care doctor or any specialists as instructed.     Discharge Plan:   Diet Plan: Discussed  Activity Level: Discussed  Confirmed Follow up Appointment: Appointment Scheduled  Confirmed Symptoms Management: Discussed  Medication Reconciliation Updated: Yes  Influenza Vaccine Indication: Not indicated: Previously immunized this influenza season and > 8 years of age    I understand that a diet low in cholesterol, fat, and sodium is recommended for good health. Unless I have been given specific instructions below for another diet, I accept this instruction as my diet prescription.   Other diet: renal/dialysis    Special Instructions: None    · Is patient discharged on Warfarin / Coumadin?   No       Gastrointestinal Bleeding  Introduction  Gastrointestinal bleeding is bleeding somewhere along the path food travels through the body (digestive tract). This path is anywhere between the mouth and the opening of the butt (anus). You may have blood in your poop (stools) or have black poop. If you throw up (vomit), there may be blood in it.  This condition can be mild, serious, or even life-threatening. If you have a lot of bleeding, you may need to stay in the hospital.  Follow these instructions at home:  · Take over-the-counter and prescription medicines only as told by your doctor.  · Eat foods that have a lot of fiber in them. These foods include whole grains, fruits, and vegetables. You can also try eating 1-3 prunes each day.  · Drink enough fluid to keep your pee (urine) clear or pale yellow.  · Keep all follow-up visits as told by your doctor. This is important.  Contact a doctor if:  · Your symptoms do not get better.  Get help right away if:  · Your bleeding gets worse.  · You feel dizzy or you pass  out (faint).  · You feel weak.  · You have very bad cramps in your back or belly (abdomen).  · You pass large clumps of blood (clots) in your poop.  · Your symptoms are getting worse.  This information is not intended to replace advice given to you by your health care provider. Make sure you discuss any questions you have with your health care provider.  Document Released: 09/26/2009 Document Revised: 05/25/2017 Document Reviewed: 06/06/2016  © 2017 Elsevier      Blood Transfusion  A blood transfusion is a procedure in which you are given blood through an IV tube. You may need this procedure because of:  · Illness.  · Surgery.  · Injury.  The blood may come from someone else (a donor). You may also be able to donate blood for yourself (autologous blood donation). The blood given in a transfusion is made up of different types of cells. You may get:  · Red blood cells. These carry oxygen to the cells in the body.  · White blood cells. These help you fight infections.  · Platelets. These help your blood to clot.  · Plasma. This is the liquid part of your blood. It helps with fluid imbalances.  If you have a clotting disorder, you may also get other types of blood products.  What happens before the procedure?  · You will have a blood test to find out your blood type. The test also finds out what type of blood your body will accept and matches it to the donor type.  · If you are going to have a planned surgery, you may be able to donate your own blood. This may be done in case you need a transfusion.  · If you have had an allergic reaction to a transfusion in the past, you may be given medicine to help prevent a reaction. This medicine may be given to you by mouth or through an IV.  · You will have your temperature, blood pressure, and pulse checked.  · Follow instructions from your doctor about what you cannot eat or drink.  · Ask your doctor about:  ¨ Changing or stopping your regular medicines. This is important if  you take diabetes medicines or blood thinners.  ¨ Taking medicines such as aspirin and ibuprofen. These medicines can thin your blood. Do not take these medicines before your procedure if your doctor tells you not to.  What happens during the procedure?  · An IV tube will be put into one of your veins.  · The bag of donated blood will be attached to your IV tube. Then, the blood will enter through your vein.  · Your temperature, blood pressure, and pulse will be checked regularly during the procedure. This is done to find early signs of a transfusion reaction.  · If you have any signs or symptoms of a reaction, your transfusion will be stopped. You may also be given medicine.  · When the transfusion is done, your IV tube will be taken out.  · Pressure may be applied to the IV site for a few minutes.  · A bandage (dressing) will be put on the IV site.  The procedure may vary among doctors and hospitals.  What happens after the procedure?  · Your temperature, blood pressure, heart rate, breathing rate, and blood oxygen level will be checked often.  · Your blood may be tested to see how you are responding to the transfusion.  · You may be warmed with fluids or blankets. This is done to keep the temperature of your body normal.  Summary  · A blood transfusion is a procedure in which you are given blood through an IV tube.  · The blood may come from someone else (a donor). You may also be able to donate blood for yourself.  · If you have had an allergic reaction to a transfusion in the past, you may be given medicine to help prevent a reaction. This medicine may be given to you by mouth or through an IV tube.  · Your temperature, blood pressure, heart rate, breathing rate, and blood oxygen level will be checked often.  · Your blood may be tested to see how you are responding to the transfusion.  This information is not intended to replace advice given to you by your health care provider. Make sure you discuss any  questions you have with your health care provider.  Document Released: 03/16/2010 Document Revised: 08/11/2017 Document Reviewed: 08/11/2017  [employer] Interactive Patient Education © 2017 [employer]    Ciprofloxacin tablets  What is this medicine?  CIPROFLOXACIN (sip garima FLOX a sin) is a quinolone antibiotic. It is used to treat certain kinds of bacterial infections. It will not work for colds, flu, or other viral infections.  This medicine may be used for other purposes; ask your health care provider or pharmacist if you have questions.  COMMON BRAND NAME(S): Cipro  What should I tell my health care provider before I take this medicine?  They need to know if you have any of these conditions:  -bone problems  -history of low levels of potassium in the blood  -joint problems  -irregular heartbeat  -kidney disease  -myasthenia gravis  -seizures  -tendon problems  -tingling of the fingers or toes, or other nerve disorder  -an unusual or allergic reaction to ciprofloxacin, other antibiotics or medicines, foods, dyes, or preservatives  -pregnant or trying to get pregnant  -breast-feeding  How should I use this medicine?  Take this medicine by mouth with a glass of water. Follow the directions on the prescription label. Take your medicine at regular intervals. Do not take your medicine more often than directed. Take all of your medicine as directed even if you think your are better. Do not skip doses or stop your medicine early.  You can take this medicine with food or on an empty stomach. It can be taken with a meal that contains dairy or calcium, but do not take it alone with a dairy product, like milk or yogurt or calcium-fortified juice.  A special MedGuide will be given to you by the pharmacist with each prescription and refill. Be sure to read this information carefully each time.  Talk to your pediatrician regarding the use of this medicine in children. Special care may be needed.  Overdosage: If you think you  have taken too much of this medicine contact a poison control center or emergency room at once.  NOTE: This medicine is only for you. Do not share this medicine with others.  What if I miss a dose?  If you miss a dose, take it as soon as you can. If it is almost time for your next dose, take only that dose. Do not take double or extra doses.  What may interact with this medicine?  Do not take this medicine with any of the following medications:  -cisapride  -dofetilide  -dronedarone  -flibanserin  -lomitapide  -pimozide  -thioridazine  -tizanidine  -ziprasidone  This medicine may also interact with the following medications:  -antacids  -birth control pills  -caffeine  -certain medicines for diabetes, like glipizide or glyburide  -certain medicines that treat or prevent blood clots like warfarin  -clozapine  -cyclosporine  -didanosine (ddI) buffered tablets or powder  -duloxetine  -lanthanum carbonate  -lidocaine  -methotrexate  -multivitamins  -NSAIDS, medicines for pain and inflammation, like ibuprofen or naproxen  -olanzapine  -omeprazole  -other medicines that prolong the QT interval (cause an abnormal heart rhythm)  -phenytoin  -probenecid  -ropinirole  -sevelamer  -sildenafil  -sucralfate  -theophylline  -zolpidem  This list may not describe all possible interactions. Give your health care provider a list of all the medicines, herbs, non-prescription drugs, or dietary supplements you use. Also tell them if you smoke, drink alcohol, or use illegal drugs. Some items may interact with your medicine.  What should I watch for while using this medicine?  Tell your doctor or health care professional if your symptoms do not improve.  Do not treat diarrhea with over the counter products. Contact your doctor if you have diarrhea that lasts more than 2 days or if it is severe and watery.  You may get drowsy or dizzy. Do not drive, use machinery, or do anything that needs mental alertness until you know how this medicine  affects you. Do not stand or sit up quickly, especially if you are an older patient. This reduces the risk of dizzy or fainting spells.  This medicine can make you more sensitive to the sun. Keep out of the sun. If you cannot avoid being in the sun, wear protective clothing and use sunscreen. Do not use sun lamps or tanning beds/booths.  Avoid antacids, aluminum, calcium, iron, magnesium, and zinc products for 6 hours before and 2 hours after taking a dose of this medicine.  What side effects may I notice from receiving this medicine?  Side effects that you should report to your doctor or health care professional as soon as possible:  -allergic reactions like skin rash or hives, swelling of the face, lips, or tongue  -anxious  -confusion  -depressed mood  -diarrhea  -fast, irregular heartbeat  -hallucination, loss of contact with reality  -joint, muscle, or tendon pain or swelling  -pain, tingling, numbness in the hands or feet  -suicidal thoughts or other mood changes  -sunburn  -unusually weak or tired  Side effects that usually do not require medical attention (report to your doctor or health care professional if they continue or are bothersome):  -dry mouth  -headache  -nausea  -trouble sleeping  This list may not describe all possible side effects. Call your doctor for medical advice about side effects. You may report side effects to FDA at 0-814-FDA-5973.  Where should I keep my medicine?  Keep out of the reach of children.  Store at room temperature below 30 degrees C (86 degrees F). Keep container tightly closed. Throw away any unused medicine after the expiration date.  NOTE: This sheet is a summary. It may not cover all possible information. If you have questions about this medicine, talk to your doctor, pharmacist, or health care provider.  © 2018 Elsevier/Gold Standard (2017-07-28 14:42:02)      Depression / Suicide Risk    As you are discharged from this [facility] facility, it is important to learn  how to keep safe from harming yourself.    Recognize the warning signs:  · Abrupt changes in personality, positive or negative- including increase in energy   · Giving away possessions  · Change in eating patterns- significant weight changes-  positive or negative  · Change in sleeping patterns- unable to sleep or sleeping all the time   · Unwillingness or inability to communicate  · Depression  · Unusual sadness, discouragement and loneliness  · Talk of wanting to die  · Neglect of personal appearance   · Rebelliousness- reckless behavior  · Withdrawal from people/activities they love  · Confusion- inability to concentrate     If you or a loved one observes any of these behaviors or has concerns about self-harm, here's what you can do:  · Talk about it- your feelings and reasons for harming yourself  · Remove any means that you might use to hurt yourself (examples: pills, rope, extension cords, firearm)  · Get professional help from the community (Mental Health, Substance Abuse, psychological counseling)  · Do not be alone:Call your Safe Contact- someone whom you trust who will be there for you.  · Call your local CRISIS HOTLINE 995-4565 or 179-586-5382  · Call your local Children's Mobile Crisis Response Team [place] (968) 905-1133 or www.Klipfolio  · Call the toll free National Suicide Prevention Hotlines   · National Suicide Prevention Lifeline 672-306-LTXT (2854)  · National Hope Line Network 800-SUICIDE (316-8952)

## 2018-03-26 ENCOUNTER — PATIENT OUTREACH (OUTPATIENT)
Dept: HEALTH INFORMATION MANAGEMENT | Facility: OTHER | Age: 78
End: 2018-03-26

## 2018-03-27 ENCOUNTER — PATIENT OUTREACH (OUTPATIENT)
Dept: HEALTH INFORMATION MANAGEMENT | Facility: OTHER | Age: 78
End: 2018-03-27

## 2018-03-27 NOTE — PROGRESS NOTES
CM post discharge outreach call.  CM spoke to patient.  Reviewed upcoming appointment at post discharge clinic.  Patient requesting CM cancel this appointment.  CM encouraged patient to reschedule as patient currently states he doesn't have a PCP.  Patient declining to reschedule.  States this appointment isn't necessary.  CM encouraged patient to follow-up with his providers as instructed by hospitalist.  CM was unable to reschedule patient at post discharge clinic as patient declined.

## 2018-04-11 DIAGNOSIS — N18.6 END STAGE RENAL DISEASE (HCC): ICD-10-CM

## 2018-04-11 RX ORDER — ASCORBIC ACID, THIAMINE, RIBOFLAVIN, NIACINAMIDE, PYRIDOXINE, FOLIC ACID, COBALAMIN, BIOTIN, PANTOTHENIC ACID 100; 1.5; 1.7; 20; 10; 1; 6; 300; 1 MG/1; MG/1; MG/1; MG/1; MG/1; MG/1; UG/1; UG/1; MG/1
TABLET, COATED ORAL
Qty: 90 TAB | Refills: 3 | Status: SHIPPED | OUTPATIENT
Start: 2018-04-11 | End: 2018-06-12

## 2018-04-20 ENCOUNTER — APPOINTMENT (OUTPATIENT)
Dept: NEPHROLOGY | Facility: MEDICAL CENTER | Age: 78
End: 2018-04-20
Payer: MEDICARE

## 2018-04-25 RX ORDER — TRAZODONE HYDROCHLORIDE 150 MG/1
TABLET ORAL
Qty: 180 TAB | Refills: 3 | Status: SHIPPED | OUTPATIENT
Start: 2018-04-25 | End: 2019-03-19 | Stop reason: SDUPTHER

## 2018-05-03 ENCOUNTER — PATIENT OUTREACH (OUTPATIENT)
Dept: HEALTH INFORMATION MANAGEMENT | Facility: OTHER | Age: 78
End: 2018-05-03

## 2018-05-03 NOTE — PROGRESS NOTES
Patient Parmjit Castelan admitted for encephalopathy on 3/19/18 and discharged on 3/23/18. [facility] Patient Advocate confirmed patient did not show to a discharge clinic appointment, a new PCP appointment with Dr. Martha Wise, and requested [facility] to cancel a nephrology appointment with Dr. Larson. Patient Advocate did assist in re-scheduling patient’s PCP appointment for 5/24/18. Patient is also scheduled for a pulmonary appointment on 5/15/18, a GI appointment with Dr. Carrasquillo on 5/30/18, an imaging appointment on 8/7/18, and a cardiology appointment on 9/18/18. PPS score is 100%.

## 2018-05-15 ENCOUNTER — APPOINTMENT (OUTPATIENT)
Dept: PULMONOLOGY | Facility: HOSPICE | Age: 78
End: 2018-05-15
Payer: MEDICARE

## 2018-05-18 RX ORDER — ROPINIROLE 0.25 MG/1
0.5 TABLET, FILM COATED ORAL
Qty: 60 TAB | Refills: 11 | Status: SHIPPED | OUTPATIENT
Start: 2018-05-18 | End: 2018-09-11

## 2018-05-30 ENCOUNTER — HOSPITAL ENCOUNTER (OUTPATIENT)
Dept: LAB | Facility: MEDICAL CENTER | Age: 78
End: 2018-05-30
Attending: INTERNAL MEDICINE
Payer: MEDICARE

## 2018-05-30 LAB
BASOPHILS # BLD AUTO: 1.3 % (ref 0–1.8)
BASOPHILS # BLD: 0.04 K/UL (ref 0–0.12)
EOSINOPHIL # BLD AUTO: 0.12 K/UL (ref 0–0.51)
EOSINOPHIL NFR BLD: 3.9 % (ref 0–6.9)
ERYTHROCYTE [DISTWIDTH] IN BLOOD BY AUTOMATED COUNT: 61.1 FL (ref 35.9–50)
FERRITIN SERPL-MCNC: 98.6 NG/ML (ref 22–322)
HCT VFR BLD AUTO: 31.6 % (ref 42–52)
HGB BLD-MCNC: 9.9 G/DL (ref 14–18)
IMM GRANULOCYTES # BLD AUTO: 0.01 K/UL (ref 0–0.11)
IMM GRANULOCYTES NFR BLD AUTO: 0.3 % (ref 0–0.9)
IRON SATN MFR SERPL: 15 % (ref 15–55)
IRON SERPL-MCNC: 50 UG/DL (ref 50–180)
LYMPHOCYTES # BLD AUTO: 0.38 K/UL (ref 1–4.8)
LYMPHOCYTES NFR BLD: 12.4 % (ref 22–41)
MCH RBC QN AUTO: 32.6 PG (ref 27–33)
MCHC RBC AUTO-ENTMCNC: 31.3 G/DL (ref 33.7–35.3)
MCV RBC AUTO: 103.9 FL (ref 81.4–97.8)
MONOCYTES # BLD AUTO: 0.39 K/UL (ref 0–0.85)
MONOCYTES NFR BLD AUTO: 12.7 % (ref 0–13.4)
NEUTROPHILS # BLD AUTO: 2.13 K/UL (ref 1.82–7.42)
NEUTROPHILS NFR BLD: 69.4 % (ref 44–72)
NRBC # BLD AUTO: 0 K/UL
NRBC BLD-RTO: 0 /100 WBC
PLATELET # BLD AUTO: 196 K/UL (ref 164–446)
PMV BLD AUTO: 10.2 FL (ref 9–12.9)
RBC # BLD AUTO: 3.04 M/UL (ref 4.7–6.1)
TIBC SERPL-MCNC: 332 UG/DL (ref 250–450)
WBC # BLD AUTO: 3.1 K/UL (ref 4.8–10.8)

## 2018-05-30 PROCEDURE — 36415 COLL VENOUS BLD VENIPUNCTURE: CPT

## 2018-05-30 PROCEDURE — 83550 IRON BINDING TEST: CPT

## 2018-05-30 PROCEDURE — 85025 COMPLETE CBC W/AUTO DIFF WBC: CPT

## 2018-05-30 PROCEDURE — 83540 ASSAY OF IRON: CPT

## 2018-05-30 PROCEDURE — 82728 ASSAY OF FERRITIN: CPT

## 2018-06-08 DIAGNOSIS — I10 ESSENTIAL HYPERTENSION: ICD-10-CM

## 2018-06-12 ENCOUNTER — OFFICE VISIT (OUTPATIENT)
Dept: MEDICAL GROUP | Facility: MEDICAL CENTER | Age: 78
End: 2018-06-12
Payer: MEDICARE

## 2018-06-12 VITALS
HEIGHT: 68 IN | WEIGHT: 156.97 LBS | TEMPERATURE: 99.8 F | OXYGEN SATURATION: 98 % | RESPIRATION RATE: 14 BRPM | SYSTOLIC BLOOD PRESSURE: 170 MMHG | BODY MASS INDEX: 23.79 KG/M2 | DIASTOLIC BLOOD PRESSURE: 90 MMHG | HEART RATE: 60 BPM

## 2018-06-12 DIAGNOSIS — I35.0 AORTIC STENOSIS, MODERATE: ICD-10-CM

## 2018-06-12 DIAGNOSIS — I73.9 PERIPHERAL VASCULAR DISEASE (HCC): ICD-10-CM

## 2018-06-12 DIAGNOSIS — I10 HTN (HYPERTENSION), MALIGNANT: ICD-10-CM

## 2018-06-12 DIAGNOSIS — I27.20 PULMONARY HYPERTENSION (HCC): ICD-10-CM

## 2018-06-12 DIAGNOSIS — M19.042 PRIMARY OSTEOARTHRITIS OF BOTH HANDS: ICD-10-CM

## 2018-06-12 DIAGNOSIS — M19.041 PRIMARY OSTEOARTHRITIS OF BOTH HANDS: ICD-10-CM

## 2018-06-12 DIAGNOSIS — J96.11 CHRONIC RESPIRATORY FAILURE WITH HYPOXIA (HCC): ICD-10-CM

## 2018-06-12 DIAGNOSIS — Z99.2 ANEMIA IN CHRONIC KIDNEY DISEASE, ON CHRONIC DIALYSIS (HCC): ICD-10-CM

## 2018-06-12 DIAGNOSIS — J42 CHRONIC BRONCHITIS, UNSPECIFIED CHRONIC BRONCHITIS TYPE (HCC): ICD-10-CM

## 2018-06-12 DIAGNOSIS — E78.5 DYSLIPIDEMIA: ICD-10-CM

## 2018-06-12 DIAGNOSIS — D63.1 ANEMIA IN CHRONIC KIDNEY DISEASE, ON CHRONIC DIALYSIS (HCC): ICD-10-CM

## 2018-06-12 DIAGNOSIS — F51.01 PRIMARY INSOMNIA: ICD-10-CM

## 2018-06-12 DIAGNOSIS — N18.6 ANEMIA IN CHRONIC KIDNEY DISEASE, ON CHRONIC DIALYSIS (HCC): ICD-10-CM

## 2018-06-12 DIAGNOSIS — N18.6 END STAGE RENAL DISEASE (HCC): ICD-10-CM

## 2018-06-12 PROBLEM — M46.42 CERVICAL DISCITIS: Status: RESOLVED | Noted: 2017-04-28 | Resolved: 2018-06-12

## 2018-06-12 PROBLEM — N39.0 UTI (URINARY TRACT INFECTION): Status: RESOLVED | Noted: 2018-03-22 | Resolved: 2018-06-12

## 2018-06-12 PROBLEM — G93.41 ACUTE METABOLIC ENCEPHALOPATHY: Status: RESOLVED | Noted: 2018-03-19 | Resolved: 2018-06-12

## 2018-06-12 PROBLEM — R79.89 ELEVATED TROPONIN: Status: RESOLVED | Noted: 2018-03-20 | Resolved: 2018-06-12

## 2018-06-12 PROBLEM — M54.2 ACUTE NECK PAIN: Status: RESOLVED | Noted: 2017-04-15 | Resolved: 2018-06-12

## 2018-06-12 PROBLEM — R78.81 BACTEREMIA DUE TO GRAM-POSITIVE BACTERIA: Status: RESOLVED | Noted: 2017-04-16 | Resolved: 2018-06-12

## 2018-06-12 PROCEDURE — 99214 OFFICE O/P EST MOD 30 MIN: CPT | Performed by: FAMILY MEDICINE

## 2018-06-12 RX ORDER — ZOLPIDEM TARTRATE 10 MG/1
10 TABLET ORAL NIGHTLY PRN
Qty: 30 TAB | Refills: 2 | Status: SHIPPED | OUTPATIENT
Start: 2018-06-12 | End: 2018-09-10

## 2018-06-12 RX ORDER — LABETALOL 300 MG/1
TABLET, FILM COATED ORAL
COMMUNITY
End: 2018-11-01

## 2018-06-12 RX ORDER — FLUTICASONE PROPIONATE 50 MCG
SPRAY, SUSPENSION (ML) NASAL
Status: ON HOLD | COMMUNITY
End: 2019-01-25

## 2018-06-12 RX ORDER — ZOLPIDEM TARTRATE 10 MG/1
10 TABLET ORAL NIGHTLY PRN
COMMUNITY
End: 2018-06-12 | Stop reason: SDUPTHER

## 2018-06-12 RX ORDER — BUDESONIDE AND FORMOTEROL FUMARATE DIHYDRATE 160; 4.5 UG/1; UG/1
1 AEROSOL RESPIRATORY (INHALATION) 2 TIMES DAILY
Qty: 1 INHALER | Refills: 11 | Status: SHIPPED | OUTPATIENT
Start: 2018-06-12 | End: 2018-09-11

## 2018-06-12 RX ORDER — CLOPIDOGREL BISULFATE 75 MG/1
TABLET ORAL
COMMUNITY
End: 2018-07-02 | Stop reason: SDUPTHER

## 2018-06-12 RX ORDER — TAMSULOSIN HYDROCHLORIDE 0.4 MG/1
CAPSULE ORAL
Qty: 180 CAP | Refills: 3 | Status: SHIPPED | OUTPATIENT
Start: 2018-06-12 | End: 2018-10-31

## 2018-06-12 RX ORDER — LISINOPRIL 40 MG/1
TABLET ORAL
Status: ON HOLD | COMMUNITY
End: 2019-01-25

## 2018-06-12 RX ORDER — FUROSEMIDE 80 MG
TABLET ORAL
Qty: 180 TAB | Refills: 3 | Status: SHIPPED | OUTPATIENT
Start: 2018-06-12 | End: 2018-06-12

## 2018-06-12 NOTE — ASSESSMENT & PLAN NOTE
This is a chronic health problem that is well controlled with current medications and lifestyle measures.  He denies chest pain, dizziness or SOB.

## 2018-06-12 NOTE — ASSESSMENT & PLAN NOTE
This is a chronic health condition for this patient for which she sees [facility].  He is seen on a fairly regular basis.  He has a smoking history.

## 2018-06-12 NOTE — ASSESSMENT & PLAN NOTE
This was a chronic health problem for this patient he developed chronic kidney disease secondary to uncontrolled hypertension.  He now has dialysis on Monday Wednesdays and Fridays.  They also monitor his hemoglobin and his most recent on 6/6/18 was 10.1 which is an improvement.  He is continuing to show progress.

## 2018-06-12 NOTE — ASSESSMENT & PLAN NOTE
This is a chronic health problem that is well controlled with current medications and lifestyle measures.  He sees Dr. 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  He is no longer on Percocet.  He weaned himself off that medication.

## 2018-06-12 NOTE — ASSESSMENT & PLAN NOTE
This is a chronic problem for this patient that currently is problematic.  He is having pain with walking and having to stop.  He is experiencing claudication.  He seen Dr. Cason tomorrow for further evaluation.

## 2018-06-12 NOTE — ASSESSMENT & PLAN NOTE
This is a chronic health problem for this patient due to all of his chronic health problems.  In the past he was utilizing Percocet taking 1 at bedtime which got him to sleep but he did not like the idea of utilizing an opiate.  He has since weaned himself off that medication and had some old out of date Ambien at the house that he tried.  He thinks that has been helpful.  He would like to have an up-to-date prescription to see if he could take this.  We discussed the fact that this is now controlled by a new state law .  We will check his  and write the prescription for 90 day supply.    Obtained and reviewed patient utilization report from [facility] pharmacy database on 6/12/2018 1:12 PM  prior to writing prescription for controlled substance II, III or IV per [place] bill . Based on assessment of the report, the prescription is medically necessary.

## 2018-06-12 NOTE — ASSESSMENT & PLAN NOTE
This is a chronic health problem for this patient for which she is on Pravachol.  His cholesterol has improved dramatically with this most recent study done 1/2018 showing a total cholesterol of 105.

## 2018-06-12 NOTE — ASSESSMENT & PLAN NOTE
This is a chronic problem for this patient related to obstructive sleep apnea.  Patient does have obstructive sleep apnea but could not tolerate a facial mask and therefore cannot wear his CPAP.  He does use oxygen at night with a setting between 2-3 L per nasal cannula.

## 2018-06-12 NOTE — ASSESSMENT & PLAN NOTE
This is a chronic health condition for this patient.  He has a high blood pressure today at 170/90 but he only just took his medication prior to coming to the office.  He does take his medications on a regular basis.  His blood pressures at home run between 150-160 systolic but during dialysis he drops to the 130s.

## 2018-06-21 DIAGNOSIS — G25.81 RESTLESS LEG: ICD-10-CM

## 2018-06-28 ENCOUNTER — HOSPITAL ENCOUNTER (OUTPATIENT)
Facility: MEDICAL CENTER | Age: 78
End: 2018-06-28
Attending: SURGERY | Admitting: SURGERY
Payer: MEDICARE

## 2018-06-28 ENCOUNTER — APPOINTMENT (OUTPATIENT)
Dept: RADIOLOGY | Facility: MEDICAL CENTER | Age: 78
End: 2018-06-28
Attending: SURGERY
Payer: MEDICARE

## 2018-06-28 VITALS
HEART RATE: 81 BPM | SYSTOLIC BLOOD PRESSURE: 141 MMHG | DIASTOLIC BLOOD PRESSURE: 69 MMHG | WEIGHT: 153 LBS | TEMPERATURE: 97.5 F | OXYGEN SATURATION: 97 % | BODY MASS INDEX: 23.19 KG/M2 | RESPIRATION RATE: 18 BRPM | HEIGHT: 68 IN

## 2018-06-28 DIAGNOSIS — I70.213 ATHEROSCLEROSIS OF NATIVE ARTERIES OF EXTREMITIES WITH INTERMITTENT CLAUDICATION, BILATERAL LEGS (HCC): ICD-10-CM

## 2018-06-28 LAB
ANION GAP SERPL CALC-SCNC: 13 MMOL/L (ref 0–11.9)
BASOPHILS # BLD AUTO: 1.7 % (ref 0–1.8)
BASOPHILS # BLD: 0.05 K/UL (ref 0–0.12)
BUN SERPL-MCNC: 31 MG/DL (ref 8–22)
CALCIUM SERPL-MCNC: 9.9 MG/DL (ref 8.5–10.5)
CHLORIDE SERPL-SCNC: 99 MMOL/L (ref 96–112)
CO2 SERPL-SCNC: 30 MMOL/L (ref 20–33)
CREAT SERPL-MCNC: 5.29 MG/DL (ref 0.5–1.4)
EOSINOPHIL # BLD AUTO: 0.1 K/UL (ref 0–0.51)
EOSINOPHIL NFR BLD: 3.5 % (ref 0–6.9)
ERYTHROCYTE [DISTWIDTH] IN BLOOD BY AUTOMATED COUNT: 57.6 FL (ref 35.9–50)
GLUCOSE SERPL-MCNC: 90 MG/DL (ref 65–99)
HCT VFR BLD AUTO: 29.4 % (ref 42–52)
HGB BLD-MCNC: 9.5 G/DL (ref 14–18)
IMM GRANULOCYTES # BLD AUTO: 0.02 K/UL (ref 0–0.11)
IMM GRANULOCYTES NFR BLD AUTO: 0.7 % (ref 0–0.9)
LYMPHOCYTES # BLD AUTO: 0.45 K/UL (ref 1–4.8)
LYMPHOCYTES NFR BLD: 15.6 % (ref 22–41)
MCH RBC QN AUTO: 32.4 PG (ref 27–33)
MCHC RBC AUTO-ENTMCNC: 32.3 G/DL (ref 33.7–35.3)
MCV RBC AUTO: 100.3 FL (ref 81.4–97.8)
MONOCYTES # BLD AUTO: 0.42 K/UL (ref 0–0.85)
MONOCYTES NFR BLD AUTO: 14.5 % (ref 0–13.4)
NEUTROPHILS # BLD AUTO: 1.85 K/UL (ref 1.82–7.42)
NEUTROPHILS NFR BLD: 64 % (ref 44–72)
NRBC # BLD AUTO: 0 K/UL
NRBC BLD-RTO: 0 /100 WBC
PLATELET # BLD AUTO: 223 K/UL (ref 164–446)
PMV BLD AUTO: 9.6 FL (ref 9–12.9)
POTASSIUM SERPL-SCNC: 4.3 MMOL/L (ref 3.6–5.5)
RBC # BLD AUTO: 2.93 M/UL (ref 4.7–6.1)
SODIUM SERPL-SCNC: 142 MMOL/L (ref 135–145)
WBC # BLD AUTO: 2.9 K/UL (ref 4.8–10.8)

## 2018-06-28 PROCEDURE — A9270 NON-COVERED ITEM OR SERVICE: HCPCS

## 2018-06-28 PROCEDURE — 700102 HCHG RX REV CODE 250 W/ 637 OVERRIDE(OP)

## 2018-06-28 PROCEDURE — 160002 HCHG RECOVERY MINUTES (STAT)

## 2018-06-28 PROCEDURE — 99153 MOD SED SAME PHYS/QHP EA: CPT

## 2018-06-28 PROCEDURE — 700111 HCHG RX REV CODE 636 W/ 250 OVERRIDE (IP): Performed by: SURGERY

## 2018-06-28 PROCEDURE — 80048 BASIC METABOLIC PNL TOTAL CA: CPT

## 2018-06-28 PROCEDURE — 700117 HCHG RX CONTRAST REV CODE 255: Performed by: SURGERY

## 2018-06-28 PROCEDURE — 700111 HCHG RX REV CODE 636 W/ 250 OVERRIDE (IP)

## 2018-06-28 PROCEDURE — 85025 COMPLETE CBC W/AUTO DIFF WBC: CPT

## 2018-06-28 RX ORDER — HYDRALAZINE HYDROCHLORIDE 20 MG/ML
10 INJECTION INTRAMUSCULAR; INTRAVENOUS ONCE
Status: COMPLETED | OUTPATIENT
Start: 2018-06-28 | End: 2018-06-28

## 2018-06-28 RX ORDER — IODIXANOL 270 MG/ML
24 INJECTION, SOLUTION INTRAVASCULAR ONCE
Status: COMPLETED | OUTPATIENT
Start: 2018-06-28 | End: 2018-06-28

## 2018-06-28 RX ORDER — HYDRALAZINE HYDROCHLORIDE 20 MG/ML
INJECTION INTRAMUSCULAR; INTRAVENOUS
Status: COMPLETED
Start: 2018-06-28 | End: 2018-06-28

## 2018-06-28 RX ORDER — MIDAZOLAM HYDROCHLORIDE 1 MG/ML
.5-2 INJECTION INTRAMUSCULAR; INTRAVENOUS PRN
Status: ACTIVE | OUTPATIENT
Start: 2018-06-28 | End: 2018-06-28

## 2018-06-28 RX ORDER — PROTAMINE SULFATE 10 MG/ML
INJECTION, SOLUTION INTRAVENOUS
Status: COMPLETED
Start: 2018-06-28 | End: 2018-06-28

## 2018-06-28 RX ORDER — PROTAMINE SULFATE 10 MG/ML
100 INJECTION, SOLUTION INTRAVENOUS ONCE
Status: COMPLETED | OUTPATIENT
Start: 2018-06-28 | End: 2018-06-28

## 2018-06-28 RX ORDER — HEPARIN SODIUM,PORCINE 1000/ML
VIAL (ML) INJECTION
Status: COMPLETED
Start: 2018-06-28 | End: 2018-06-28

## 2018-06-28 RX ORDER — ONDANSETRON 2 MG/ML
4 INJECTION INTRAMUSCULAR; INTRAVENOUS PRN
Status: DISCONTINUED | OUTPATIENT
Start: 2018-06-28 | End: 2018-06-28 | Stop reason: ALTCHOICE

## 2018-06-28 RX ORDER — OXYCODONE HYDROCHLORIDE AND ACETAMINOPHEN 5; 325 MG/1; MG/1
1 TABLET ORAL EVERY 4 HOURS PRN
Status: DISCONTINUED | OUTPATIENT
Start: 2018-06-28 | End: 2018-06-29 | Stop reason: HOSPADM

## 2018-06-28 RX ORDER — VERAPAMIL HYDROCHLORIDE 2.5 MG/ML
INJECTION, SOLUTION INTRAVENOUS
Status: COMPLETED
Start: 2018-06-28 | End: 2018-06-28

## 2018-06-28 RX ORDER — ONDANSETRON 2 MG/ML
4 INJECTION INTRAMUSCULAR; INTRAVENOUS EVERY 4 HOURS PRN
Status: DISCONTINUED | OUTPATIENT
Start: 2018-06-28 | End: 2018-06-29 | Stop reason: HOSPADM

## 2018-06-28 RX ORDER — MIDAZOLAM HYDROCHLORIDE 1 MG/ML
INJECTION INTRAMUSCULAR; INTRAVENOUS
Status: COMPLETED
Start: 2018-06-28 | End: 2018-06-28

## 2018-06-28 RX ORDER — ALPRAZOLAM 0.25 MG/1
TABLET ORAL
Status: COMPLETED
Start: 2018-06-28 | End: 2018-06-28

## 2018-06-28 RX ORDER — HEPARIN SODIUM 1000 [USP'U]/ML
8000 INJECTION, SOLUTION INTRAVENOUS; SUBCUTANEOUS ONCE
Status: COMPLETED | OUTPATIENT
Start: 2018-06-28 | End: 2018-06-28

## 2018-06-28 RX ORDER — ALPRAZOLAM 0.5 MG/1
0.5 TABLET ORAL ONCE
Status: COMPLETED | OUTPATIENT
Start: 2018-06-28 | End: 2018-06-28

## 2018-06-28 RX ORDER — VERAPAMIL HYDROCHLORIDE 2.5 MG/ML
5 INJECTION, SOLUTION INTRAVENOUS
Status: DISCONTINUED | OUTPATIENT
Start: 2018-06-28 | End: 2018-06-29 | Stop reason: HOSPADM

## 2018-06-28 RX ORDER — SODIUM CHLORIDE 9 MG/ML
500 INJECTION, SOLUTION INTRAVENOUS
Status: ACTIVE | OUTPATIENT
Start: 2018-06-28 | End: 2018-06-28

## 2018-06-28 RX ORDER — HEPARIN SODIUM 1000 [USP'U]/ML
2000 INJECTION, SOLUTION INTRAVENOUS; SUBCUTANEOUS ONCE
Status: COMPLETED | OUTPATIENT
Start: 2018-06-28 | End: 2018-06-28

## 2018-06-28 RX ADMIN — HEPARIN SODIUM 2000 UNITS: 1000 INJECTION, SOLUTION INTRAVENOUS; SUBCUTANEOUS at 14:49

## 2018-06-28 RX ADMIN — NITROGLYCERIN 25 ML: 20 INJECTION INTRAVENOUS at 14:37

## 2018-06-28 RX ADMIN — VERAPAMIL HYDROCHLORIDE 5 MG: 2.5 INJECTION, SOLUTION INTRAVENOUS at 14:37

## 2018-06-28 RX ADMIN — IODIXANOL 24 ML: 270 INJECTION, SOLUTION INTRAVASCULAR at 15:34

## 2018-06-28 RX ADMIN — FENTANYL CITRATE 25 MCG: 50 INJECTION, SOLUTION INTRAMUSCULAR; INTRAVENOUS at 13:32

## 2018-06-28 RX ADMIN — HYDRALAZINE HYDROCHLORIDE 10 MG: 20 INJECTION INTRAMUSCULAR; INTRAVENOUS at 18:45

## 2018-06-28 RX ADMIN — ALPRAZOLAM 0.5 MG: 0.25 TABLET ORAL at 20:00

## 2018-06-28 RX ADMIN — HEPARIN SODIUM 8000 UNITS: 1000 INJECTION, SOLUTION INTRAVENOUS; SUBCUTANEOUS at 14:16

## 2018-06-28 RX ADMIN — MIDAZOLAM 2 MG: 1 INJECTION INTRAMUSCULAR; INTRAVENOUS at 13:32

## 2018-06-28 RX ADMIN — MIDAZOLAM HYDROCHLORIDE 1 MG: 1 INJECTION INTRAMUSCULAR; INTRAVENOUS at 14:35

## 2018-06-28 RX ADMIN — PROTAMINE SULFATE 100 MG: 10 INJECTION, SOLUTION INTRAVENOUS at 15:24

## 2018-06-28 RX ADMIN — MIDAZOLAM HYDROCHLORIDE 1 MG: 1 INJECTION INTRAMUSCULAR; INTRAVENOUS at 13:50

## 2018-06-28 RX ADMIN — HYDRALAZINE HYDROCHLORIDE 10 MG: 20 INJECTION INTRAMUSCULAR; INTRAVENOUS at 15:14

## 2018-06-28 RX ADMIN — MIDAZOLAM HYDROCHLORIDE 2 MG: 1 INJECTION INTRAMUSCULAR; INTRAVENOUS at 13:32

## 2018-06-28 ASSESSMENT — PAIN SCALES - GENERAL
PAINLEVEL_OUTOF10: 0
PAINLEVEL_OUTOF10: 3
PAINLEVEL_OUTOF10: 1
PAINLEVEL_OUTOF10: 0
PAINLEVEL_OUTOF10: 0

## 2018-06-28 NOTE — PROGRESS NOTES
IR Procedure Note:    Site Marked and Confirmed with MD, patient and RN pre procedure. Dr. Cason performed a lower extremity angiogram with Jetstream and angioplasty.  The pt tolerated the procedure well; ETCo2 baseline 30, with consistent waveform during the procedure.  Starclose, tegaderm and gauze applied to left groin, CDI and soft; pressure held x 20 minutes.  Pt alert and verbally appropriate post procedure, vital signs stable during procedure and transport, see flow sheet for vital signs.  Report given to Ivone BYRD.  RN transported pt to PPU.      Procedure End Time: 1515

## 2018-06-28 NOTE — OP REPORT
DATE OF SERVICE:  06/28/2018    PREOPERATIVE DIAGNOSIS:  Bilateral symptomatic lower extremity ischemia.    POSTOPERATIVE DIAGNOSIS:  Bilateral symptomatic lower extremity ischemia.    OPERATIONS:  1.  Left femoral sheath placement under ultrasound and fluoroscopic guidance.  2.  Distal aortoiliac angiogram.  3.  Right lower extremity catheter passage with right lower extremity runoff   imaging.  4.  Right critical stenosis popliteal artery, treated with Jetstream,   atherectomy and drug-coated balloon angioplasty.    SURGEON:  David Cason MD    ANESTHESIA:  Moderate conscious sedation.    SUMMARY OF FINDINGS:  Distal aortoiliac artery appeared widely patent with   calcific wall disease.  Right lower extremity has diffuse disease, but in the   popliteal artery, there is severe disease with near occlusion that is located   just below the knee joint.  A 3-vessel runoff.  With rotational atherectomy up   2 blades up on a 2.1 Jetstream, we were able to restore a significantly   improved lumen combined with balloon angioplasty with a 4x80 mm Impact   drug-coated balloon.    DESCRIPTION OF PROCEDURE:  After obtaining informed consent, the patient was   positioned supine.  A time-out was performed.  I considered whether to do the   left leg or the right leg first.  Right leg has a recurrent stenosis and more   severe than the stenosis on the left.  The left has a distal superficial   femoral artery stenosis and the right, which we chose to treat today has a   popliteal area segment of stenoses approximately 8 cm long with a functional   occlusion at the knee joint with retrograde filling of the distal popliteal   artery noted.    PROCEDURE IN DETAIL:  After obtaining informed consent, the patient's groins   were prepped with ChloraPrep and draped sterilely.  I used ultrasound to place   a left femoral 5-Amharic sheath.  The anterior wall of the artery was very   thin.  There was posterior wall plaque.  I placed a  sheath.  I then used a   Glidewire and an Omniflush catheter to go over the bifurcation.  I could not   advance the Omniflush catheter pass the distal external iliac, so I passed a   Nelliston followed by an Amplatz wire and was able to place a 7-Azeri   destination sheath from the left to the right.  A catheter had been passed   down the right lower extremity as part of the sheath placement.  We took   detailed runoff images.  I passed a Nelliston catheter down to the distal   above-knee popliteal and then used a V18 wire to cross the lesion.  I then   advanced the Nelliston into the below-knee popliteal artery.  A small AngioGuard   filter was selected and this was delivered over a bare wire that we placed it   and passed down into the peroneal artery.  We placed the filter, so it was in   the orifice of the TP trunk bifurcation and predominantly in the peroneal.    We heparinized the patient initially with 8000 and subsequently with 2000 more   units of heparin.  The Jetstream 2.1 was selected and passed over the bare   wire.  There was a lot of resistance and friction making it physically   difficult to advance the Jetstream across the lesions.  We persisted and did 2   passes at a slow steady pace with the blades down and then 2 passes with the   blades up.  We followed this by a 4x80 mm balloon angioplasty trying to make   sure that we were not going to over undersized with a drug-coated balloon.  We   concluded that it was slightly bigger than 4 mm, but a 5 mm balloon was too   large.  We took an Impact valve drug-coated balloon 4x80 mm and put it across   the lesion, which started in the above knee popliteal and crossed down to   approximately 1 cm below the knee joint on x-ray.  This was inflated to 10   atmospheres for 3 minutes.  We took this down and did an angiogram and it   looked dramatically improved with a widely patent lumen, very minimal   irregularity and very brisk throughout flow.  Three-vessel  runoff was evident.    We retrieved the filter, which had a tiny amount of debris in it.  We pulled   the sheath back over a wire and then took an x-ray of the left groin.  It   appeared that we could use a StarClose.  I did place a StarClose, but it did   not work, and I believe that is due to the very thin wall of the anterior   aspect of the left common femoral artery.  I had tried it once before this   patient and it did not work, and I believe it is not suitable for him and this   is primarily due to the vessel wall weakness.  Blood loss was less than 100   mL.  We had the heparin reversed with 100 mg of protamine and pressure was   held for 20 minutes for good hemostasis.  The patient tolerated the procedure   well and was taken to the post-procedure unit in stable condition.    Our subsequent plan will be he resume antiplatelet therapy at home.  We will   schedule him for left lower extremity intervention with atherectomy of the   superficial femoral artery sometime in the near future.       ____________________________________     MD VILMA Fowler / DELANO    DD:  06/28/2018 15:54:12  DT:  06/28/2018 16:30:52    D#:  [id number]  Job#:  [id number]

## 2018-06-29 NOTE — OR NURSING
Patient and wife given discharge instruction. All questions answered. Encouraged to call MD in the morning to time next dose of blood thinner.    Patient had IV discharge. Patient used steady gate to wheelchair and was taken to car.

## 2018-06-29 NOTE — OR NURSING
Assumed care of patient at *1850*. Received report from ROCHELLE Saba. Patient alert and oriented times. Patient denies numbness and tingling. Pain is currently 2/10. Patient is uncomfortable from laying flat. See flowsheets for VS. Call light within reach. Gurney in lowest position. Patient in reverse trendelenburg position. Wife at bedside. Ice chip provided. Confirmed dialysis is tomorrow. Plan of care discussed and written on communication board.     No questions or needs at this time from patient of family member.     Wound is clean, dry, intact and soft. Patient verbally confirmed left groin site is tender.  See wound charting.

## 2018-06-29 NOTE — DISCHARGE INSTRUCTIONS
ACTIVITY: Rest and take it easy for the first 24 hours.  A responsible adult is recommended to remain with you during that time.  It is normal to feel sleepy.  We encourage you to not do anything that requires balance, judgment or coordination.    MILD FLU-LIKE SYMPTOMS ARE NORMAL. YOU MAY EXPERIENCE GENERALIZED MUSCLE ACHES, THROAT IRRITATION, HEADACHE AND/OR SOME NAUSEA.    FOR 24 HOURS DO NOT:  Drive, operate machinery or run household appliances.  Drink beer or alcoholic beverages.   Make important decisions or sign legal documents.    DIET: To avoid nausea, slowly advance diet as tolerated, avoiding spicy or greasy foods for the first day.  Add more substantial food to your diet according to your physician's instructions.  Babies can be fed formula or breast milk as soon as they are hungry.  INCREASE FLUIDS AND FIBER TO AVOID CONSTIPATION.    SURGICAL DRESSING/BATHING: Keep dressing and incision dry for 24 hours, may shower and remove dressing after 4 PM on 6/29, do not need to replace    FOLLOW-UP APPOINTMENT:  A follow-up appointment should be arranged with your doctor Yoav [phone]; call to schedule.    You should CALL YOUR PHYSICIAN if you develop:  Fever greater than 101 degrees F.  Pain not relieved by medication, or persistent nausea or vomiting.  Excessive bleeding (blood soaking through dressing) or unexpected drainage from the wound.  Extreme redness or swelling around the incision site, drainage of pus or foul smelling drainage.  Inability to urinate or empty your bladder within 8 hours.  Problems with breathing or chest pain.    You should call 911 if you develop problems with breathing or chest pain.  If you are unable to contact your doctor or surgical center, you should go to the nearest emergency room or urgent care center.  Physician's telephone #: [phone]    If any questions arise, call your doctor.  If your doctor is not available, please feel free to call the Surgical Center at Surgical  Dept Numbers:[id number]}.  The Center is open Monday through Friday from 7AM to 7PM.  You can also call the HEALTH HOTLINE open 24 hours/day, 7 days/week and speak to a nurse at [phone], or toll free at [phone].    A registered nurse may call you a few days after your surgery to see how you are doing after your procedure.    MEDICATIONS: Resume taking daily medication.  Take prescribed pain medication with food.  If no medication is prescribed, you may take non-aspirin pain medication if needed.  PAIN MEDICATION CAN BE VERY CONSTIPATING.  Take a stool softener or laxative such as senokot, pericolace, or milk of magnesia if needed.    If your physician has prescribed pain medication that includes Acetaminophen (Tylenol), do not take additional Acetaminophen (Tylenol) while taking the prescribed medication.    Depression / Suicide Risk    As you are discharged from this [facility] facility, it is important to learn how to keep safe from harming yourself.    Recognize the warning signs:  · Abrupt changes in personality, positive or negative- including increase in energy   · Giving away possessions  · Change in eating patterns- significant weight changes-  positive or negative  · Change in sleeping patterns- unable to sleep or sleeping all the time   · Unwillingness or inability to communicate  · Depression  · Unusual sadness, discouragement and loneliness  · Talk of wanting to die  · Neglect of personal appearance   · Rebelliousness- reckless behavior  · Withdrawal from people/activities they love  · Confusion- inability to concentrate     If you or a loved one observes any of these behaviors or has concerns about self-harm, here's what you can do:  · Talk about it- your feelings and reasons for harming yourself  · Remove any means that you might use to hurt yourself (examples: pills, rope, extension cords, firearm)  · Get professional help from the community (Mental Health, Substance Abuse, psychological  "counseling)  · Do not be alone:Call your Safe Contact- someone whom you trust who will be there for you.  · Call your local CRISIS HOTLINE 962-6756 or 688-594-6772  · Call your local Children's Mobile Crisis Response Team [place] (928) 812-8328 or www.[employer]  · Call the toll free National Suicide Prevention Hotlines   · National Suicide Prevention Lifeline 606-790-AXCM (1286)  · National Hope Line Network 800-SUICIDE (555-9828)    Groin Care Instructions     INSTRUCTIONS  2. Examine (look and feel) the site of your incision site TODAY so you can recognize changes that should be called to your doctor (see below).  3. Avoid straining either by lifting or pulling objects for 4-5 days. Avoid lifting over 5 pounds.   4. For at least 72 hours, if you should sneeze or cough, please hold pressure over your groin area.  5. If you should begin to have oozing from the catheterization site, please hold firm pressure and call your doctor's office immediately.  6. If profuse bleeding occurs from the catheterization site, hold firm pressure and call \"[phone]\" immediately for assistance.  7. Remove bandage after 24 hours.     ACTIVITY  2. Limit activity as instructed by your doctor.  3. No driving or very limited driving with frequent stops for one week.   4. If you must take a long car ride, stop every hour and walk around the car.   5. Warm showers or baths are permitted after the bandage is removed. Avoid hot showers, baths, hot tubs, and swimming for one week.    PLEASE CALL YOUR DOCTOR IF:  1. Temperature elevation occurs.  2. Catheterization site becomes reddened or begins to drain.   3. Bruising appears to be new or not resolving. The bruise may move down your leg. This is normal.  4. The small round lump in the groin increases in size.  5. Any leg numbness, aching, or discomfort (immediately).  6. Increasing discomfort in the leg at the insertion site.  7. Chest pains, even if relieved by " Nitroglycerin.    MISCELLANEOUS INSTRUCTIONS  1. Bruising may occur as a result of heart catheterization. Some of the discoloration may travel down the leg, going from blue to green in color.  2. A small round lump under the catheterization site will remain for up to six weeks.  3. If any questions arise call your physician's office. You can also call the HEALTH HOTLINE open 24 hours/day, 7 days/week and speak to a nurse at [phone], or toll free at [phone].   4. You should call 911 if you develop problems with breathing or chest pain.    FOR PROBLEMS CALL AKIL Cason AT: [phone]    I acknowledge receipt and understanding of these Home Care instructions.

## 2018-06-29 NOTE — OR NURSING
Dr. Cason returned phone call.   Orders received for xanax 0.5 mg PO once.     See MAR for administration.

## 2018-06-29 NOTE — OR NURSING
1547 Patient arrived to unit, awake and alert.  Left groin CDI, soft.  1700 Left groin, CDI soft.  Patient and family updated on plan of care.  1835 Report given to Senia BYRD.  1840 Dr. Dixon called and notified of , orders received, patient medicated per MAR.  1845 Patient transferred to Phase 2.

## 2018-07-02 RX ORDER — ROPINIROLE 0.5 MG/1
TABLET, FILM COATED ORAL
Qty: 180 TAB | Refills: 3 | Status: ON HOLD | OUTPATIENT
Start: 2018-07-02 | End: 2019-01-25

## 2018-07-02 RX ORDER — CLOPIDOGREL BISULFATE 75 MG/1
TABLET ORAL
Qty: 90 TAB | Refills: 3 | Status: SHIPPED | OUTPATIENT
Start: 2018-07-02 | End: 2019-06-12

## 2018-08-07 ENCOUNTER — HOSPITAL ENCOUNTER (OUTPATIENT)
Dept: CARDIOLOGY | Facility: MEDICAL CENTER | Age: 78
End: 2018-08-07
Attending: INTERNAL MEDICINE
Payer: MEDICARE

## 2018-08-07 DIAGNOSIS — I35.0 AORTIC STENOSIS, MODERATE: ICD-10-CM

## 2018-08-07 DIAGNOSIS — I10 ESSENTIAL HYPERTENSION: ICD-10-CM

## 2018-08-07 PROCEDURE — 93306 TTE W/DOPPLER COMPLETE: CPT

## 2018-08-08 LAB
LV EJECT FRACT  99904: 55
LV EJECT FRACT MOD 2C 99903: 51.76
LV EJECT FRACT MOD 4C 99902: 48.42
LV EJECT FRACT MOD BP 99901: 46.98

## 2018-08-09 DIAGNOSIS — E78.5 HYPERLIPIDEMIA, UNSPECIFIED HYPERLIPIDEMIA TYPE: ICD-10-CM

## 2018-08-09 RX ORDER — PRAVASTATIN SODIUM 20 MG
TABLET ORAL
Qty: 90 TAB | Refills: 3 | Status: ON HOLD | OUTPATIENT
Start: 2018-08-09 | End: 2019-01-25

## 2018-09-05 ENCOUNTER — TELEPHONE (OUTPATIENT)
Dept: MEDICAL GROUP | Facility: MEDICAL CENTER | Age: 78
End: 2018-09-05

## 2018-09-05 NOTE — TELEPHONE ENCOUNTER
1. Caller Name: Parmjit Castelan                                           Call Back Number: [phone] (home)         Patient approves a detailed voicemail message: N\A    Called and left message for patient stating that he has an appointment scheduled to refill his rX for ambien and simbicort on 9/11. If this appointment doesn't work for the patient, asked him to call back to reschedule.

## 2018-09-05 NOTE — TELEPHONE ENCOUNTER
VOICEMAIL  1. Caller Name: Parmjit Castelan                        Call Back Number: [phone] (home)      2. Message: Patient called and left a VM stating that he is going to be running out of medication, & he needs a 3 month appointment. I have called patient back and left a message for him to call us back so we are able to make him an appointment.     3. Patient approves office to leave a detailed voicemail/MyChart message: yes

## 2018-09-11 ENCOUNTER — OFFICE VISIT (OUTPATIENT)
Dept: MEDICAL GROUP | Facility: MEDICAL CENTER | Age: 78
End: 2018-09-11
Payer: MEDICARE

## 2018-09-11 VITALS
HEIGHT: 68 IN | WEIGHT: 157.8 LBS | BODY MASS INDEX: 23.92 KG/M2 | DIASTOLIC BLOOD PRESSURE: 60 MMHG | RESPIRATION RATE: 12 BRPM | TEMPERATURE: 99.9 F | SYSTOLIC BLOOD PRESSURE: 142 MMHG | OXYGEN SATURATION: 92 % | HEART RATE: 70 BPM

## 2018-09-11 DIAGNOSIS — I35.0 AORTIC STENOSIS, MODERATE: ICD-10-CM

## 2018-09-11 DIAGNOSIS — F51.01 PRIMARY INSOMNIA: ICD-10-CM

## 2018-09-11 DIAGNOSIS — J42 CHRONIC BRONCHITIS, UNSPECIFIED CHRONIC BRONCHITIS TYPE (HCC): ICD-10-CM

## 2018-09-11 PROCEDURE — 99214 OFFICE O/P EST MOD 30 MIN: CPT | Performed by: FAMILY MEDICINE

## 2018-09-11 RX ORDER — ZOLPIDEM TARTRATE 10 MG/1
10 TABLET ORAL
Qty: 30 TAB | Refills: 2 | Status: SHIPPED | OUTPATIENT
Start: 2018-09-11 | End: 2018-12-11 | Stop reason: SDUPTHER

## 2018-09-11 RX ORDER — BUDESONIDE AND FORMOTEROL FUMARATE DIHYDRATE 160; 4.5 UG/1; UG/1
2 AEROSOL RESPIRATORY (INHALATION) 2 TIMES DAILY
Qty: 2 INHALER | Refills: 11 | Status: SHIPPED | OUTPATIENT
Start: 2018-09-11 | End: 2018-09-11 | Stop reason: SDUPTHER

## 2018-09-11 RX ORDER — BUDESONIDE AND FORMOTEROL FUMARATE DIHYDRATE 160; 4.5 UG/1; UG/1
2 AEROSOL RESPIRATORY (INHALATION) 2 TIMES DAILY
Qty: 2 INHALER | Refills: 11 | Status: SHIPPED | OUTPATIENT
Start: 2018-09-11 | End: 2018-12-10

## 2018-09-11 NOTE — PROGRESS NOTES
CC:  Diagnoses of Primary insomnia, Chronic bronchitis, unspecified chronic bronchitis type (HCC), and Aortic stenosis, moderate were pertinent to this visit.    HISTORY OF THE PRESENT ILLNESS: Patient is a 78 y.o. male. This pleasant patient is here today to follow-up on his primary insomnia and adjust the dosage of his Symbicort inhaler for his COPD.    Primary insomnia  Chronic stable medical condition.  Currently well controlled on Ambien.  Only getting 5 hours nightly, not using nightly.  Usually does not need on days that he gets dialysis because he is too tired.  On his days without dialysis he has more restless night. counseled on caution when using Ambien and operating vehicles or risky behavior on the following day. Obtained and reviewed patient utilization report from [facility] database on 9/11/2018 2:04 PM  prior to writing prescription for controlled substance II, III or IV per [place] bill . Based on assessment of the report,my physical exam if necessary and the patient's health problem, the prescription is medically necessary.       COPD (chronic obstructive pulmonary disease)  Chronic well-controlled medical condition.  Still abstinent from smoking for last 9 years.  Adjusting Symbicort to 2 puffs a day.    Aortic stenosis, moderate  This is a chronic health problem for this patient for which he continues to follow with cardiology.  He will be seeing Dr. Chicas in the coming month.  He just had an echocardiogram.  His echocardiogram shows that he has an ejection fraction of 55% with moderate aortic stenosis.  Patient denies chest pain, shortness of breath or dizziness.    Allergies: Patient has no known allergies.    Current Outpatient Prescriptions Ordered in [facility]   Medication Sig Dispense Refill   • Cholecalciferol (VITAMIN D PO) Take  by mouth.     • budesonide-formoterol (SYMBICORT) 160-4.5 MCG/ACT Aerosol Inhale 2 Puffs by mouth 2 Times a Day for 90 days. 2 Inhaler 11   • zolpidem  "(AMBIEN) 10 MG Tab Take 1 Tab by mouth every bedtime for 90 days. 30 Tab 2   • pravastatin (PRAVACHOL) 20 MG Tab TAKE ONE TABLET BY MOUTH DAILY 90 Tab 3   • doxazosin (CARDURA) 4 MG Tab TAKE ONE TABLET BY MOUTH DAILY 90 Tab 0   • ROPINIRole (REQUIP) 0.5 MG Tab TAKE TWO TABLETS BY MOUTH DAILY 180 Tab 3   • clopidogrel (PLAVIX) 75 MG Tab TAKE ONE TABLET BY MOUTH DAILY 90 Tab 3   • tamsulosin (FLOMAX) 0.4 MG capsule TAKE 2 CAPSULES BY MOUTH DAILY 30 MINUTES AFTER BREAKFAST 180 Cap 3   • fluticasone (FLONASE) 50 MCG/ACT nasal spray fluticasone 50 mcg/actuation nasal spray,suspension     • lisinopril (PRINIVIL, ZESTRIL) 40 MG tablet lisinopril 40 mg tablet     • labetalol (NORMODYNE) 300 MG Tab labetalol 300 mg tablet     • traZODone (DESYREL) 150 MG Tab TAKE TWO TABLETS BY MOUTH EVERY NIGHT AT BEDTIME *DESYREL* 180 Tab 3   • furosemide (LASIX) 40 MG Tab Take 40 mg by mouth 2 Times a Day.     • Calcium Acetate, Phos Binder, 667 MG Cap TAKE TWO CAPSULES BY MOUTH THREE TIMES A DAY WITH A MEAL 180 Cap 11     No current [facility]-ordered facility-administered medications on file.        Past Medical History:   Diagnosis Date   • Anesthesia     \"needs more sedation\" (can sometimes hear MD during procedure)    • Basal cell carcinoma of left cheek 3/26/2015   • BPH 7/14/2009   • Bronchitis 1/1/13   • CAD (coronary artery disease) 2/20/2014   • CATARACT     sanjuanita surgery complete   • Chronic respiratory failure with hypoxia ([facility]) 5/8/2016   • CKD (chronic kidney disease) stage 4, GFR 15-29 ml/min ([facility]) 1/15/2010    end stage renal disease   • COPD (chronic obstructive pulmonary disease) ([facility])    • COPD (chronic obstructive pulmonary disease) ([facility]) 8/2/2011   • Detached retina    • Dialysis     brianw,f juliann   • EMPHYSEMA    • Gout    • Heart burn     occas   • High cholesterol    • HTN (hypertension) 1/15/2010   • Indigestion     occas   • Kidney cyst    • Leg pain, bilateral 8/10/2015   • On supplemental oxygen therapy    • Peripheral " vascular disease (HCC) 8/10/2015   • Pneumonia    • Primary insomnia 3/22/2018   • Proteinuria    • Sleep apnea     O2 PER CANULA  AT NIGHT 2l/m   • Snoring        Past Surgical History:   Procedure Laterality Date   • ENDOSCOPY PROCEDURE  3/21/2018    Procedure: ENDOSCOPY PROCEDURE/UPPER;  Surgeon: Enrique Child D.O.;  Location: [facility];  Service: Gastroenterology   • COLONOSCOPY - ENDO  8/15/2016    Procedure: COLONOSCOPY - ENDO;  Surgeon: Mane Whatley M.D.;  Location: ENDOSCOPY [facility];  Service:    • GASTROSCOPY WITH BIOPSY  8/13/2016    Procedure: GASTROSCOPY WITH BIOPSY;  Surgeon: Jorge Leavitt M.D.;  Location: ENDOSCOPY [facility];  Service:    • RECOVERY  12/23/2015    Procedure: VASCULAR CASE-CASON-LEFT ARM FISTULOGRAM WITH ANGIOPLASTY;  Surgeon: Recoveryonly Surgery;  Location: SURGERY PRE-POST PROC UNIT [facility];  Service:    • RECOVERY  3/24/2015    Performed by Recoveryonly Surgery at SURGERY PRE-POST PROC UNIT [facility]   • RECOVERY  7/29/2014    Performed by Ir-Recovery Surgery at SURGERY SAME DAY [facility]   • RECOVERY  3/24/2014    Performed by Ir-Recovery Surgery at SURGERY [facility]   • RECOVERY  12/17/2013    Performed by Ir-Recovery Surgery at SURGERY SAME DAY [facility]   • RECOVERY  7/2/2013    Performed by Ir-Recovery Surgery at SURGERY SAME DAY [facility]   • AV FISTULA THROMBOLYSIS  7/2/2013    Performed by David Cason M.D. at SURGERY [facility]   • RECOVERY  1/29/2013    Performed by Ir-Recovery Surgery at SURGERY SAME DAY [facility]   • RECOVERY  7/23/2012    Performed by SURGERY, IR-RECOVERY at SURGERY SAME DAY [facility]   • VITRECTOMY POSTERIOR  10/11/2011    Performed by NAHUM GE at SURGERY SAME DAY [facility]   • RECOVERY  8/12/2011    Performed by SURGERY, IR-RECOVERY at SURGERY SAME DAY [facility]   • VITRECTOMY POSTERIOR  1/18/2011    Performed by NAHUM GE at SURGERY SAME DAY [facility]   • SCLERAL  BUCKLING  1/18/2011    Performed by NAHUM GE at SURGERY SAME DAY [facility]   • AV FISTULOGRAM  9/17/2010    Performed by ALESHIA MOSCOSO at SURGERY [facility]   • ANGIOPLASTY BALLOON  9/17/2010    Performed by ALESHIA MOSCOSO at SURGERY [facility]   • AV FISTULOGRAM  7/23/2010    Performed by ALESHIA MOSCOSO at SURGERY [facility]   • ANGIOPLASTY BALLOON  7/23/2010    Performed by ALESHIA MOSCOSO at SURGERY [facility]   • AV FISTULA REVISION  2/19/2010    Performed by WILLIE HATCH at SURGERY [facility]   • AV FISTULA CREATION  2/12/2010    Performed by ALESHIA MOSCOSO at SURGERY [facility]   • CATARACT PHACO WITH IOL  4/8/08    Performed by STACEY PHILLIPS at SURGERY SAME DAY [facility]       Social History   Substance Use Topics   • Smoking status: Former Smoker     Packs/day: 1.00     Years: 40.00     Types: Cigarettes     Quit date: 1/1/2009   • Smokeless tobacco: Never Used      Comment: 1 pk a day for 35 yrs, QUIT JAN 1 2010   • Alcohol use No       Social History     Social History Narrative   • No narrative on file       Family History   Problem Relation Age of Onset   • Stroke Mother    • Hypertension Mother    • Lung Disease Father         Emphysema, resp failure   • Genitourinary () Maternal Aunt         hematuria   • Hypertension Brother        ROS:     - Constitutional: Negative for fever, chills, unexpected weight change, and fatigue/generalized weakness.     - HEENT: Negative for headaches, vision changes, hearing changes, ear pain, ear discharge, rhinorrhea, sinus congestion, sore throat, and neck pain.      - Respiratory: Negative for cough, sputum production, chest congestion, dyspnea, wheezing, and crackles.      - Cardiovascular: Negative for chest pain, palpitations, orthopnea, and bilateral lower extremity edema.     - Gastrointestinal: Negative for heartburn, nausea, vomiting, abdominal pain, hematochezia, melena, diarrhea,  "constipation, and greasy/foul-smelling stools.     - Genitourinary: Negative for dysuria, polyuria, hematuria, pyuria, urinary urgency, and urinary incontinence.     - Musculoskeletal: Negative for myalgias, back pain, and joint pain.     - Skin: Negative for rash, itching, cyanotic skin color change.     - Neurological: Negative for dizziness, tingling, tremors, focal sensory deficit, focal weakness and headaches.     - Endo/Heme/Allergies: Does not bruise/bleed easily.     - Psychiatric/Behavioral: Negative for depression, suicidal/homicidal ideation and memory loss.        At least 10 systems reviewed and found to be negative, except as stated above.      Exam: Blood pressure 142/60, pulse 70, temperature 37.7 °C (99.9 °F), resp. rate 12, height 1.727 m (5' 8\"), weight 71.6 kg (157 lb 12.8 oz), SpO2 92 %. Body mass index is 23.99 kg/m².    General: Normal appearing. No distress.  HEENT: Normocephalic. Eyes conjunctiva clear lids without ptosis, pupils equal and reactive to light accommodation, ears normal shape and contour, canals are clear bilaterally, tympanic membranes are benign, nasal mucosa benign, oropharynx is without erythema, edema or exudates.   Pulmonary: Clear to ausculation.  Normal effort. No rales, ronchi, or wheezing.  Cardiovascular: Regular rate and rhythm with holosystolic murmur consistent with aortic stenosis. Carotid and radial pulses are intact and equal bilaterally.  Abdomen: Soft, nontender, nondistended. Normal bowel sounds. Liver and spleen are not palpable  Neurologic: Grossly nonfocal  Lymph: No cervical, supraclavicular or axillary lymph nodes are palpable  Skin: Warm and dry.   Extremities: Left upper extremity with large pulsatile veins from fistula.  Multiple well-healing puncture wounds of various stages likely from dialysis.   Musculoskeletal: Normal gait. No extremity cyanosis, clubbing, or edema.  Psych: Normal mood and affect. Alert and oriented x3. Judgment and insight is " normal.      Assessment/Plan  1. Primary insomnia  Chronic stable medical condition.  Counseling provided for risks of somnolence inducing medications including persistent sedating effects that may continue to the following day.  Patient is aware of risks and benefits and agrees to continue taking medications appropriately.  Due to patient's baseline renal failure this medication has a tendency to build up in his system and can represent potential toxicity.  We will continue to monitor for side effects.  - zolpidem (AMBIEN) 10 MG Tab; Take 1 Tab by mouth every bedtime for 90 days.  Dispense: 30 Tab; Refill: 2    2. Chronic bronchitis, unspecified chronic bronchitis type (HCC)  Symbicort prescription changed to 2 puffs twice daily from 1 puff twice daily.  This new prescription was sent to his pharmacy.     *Patient has upcoming appointment with Dr. Chicas, his cardiologist, to review echocardiogram results.      3.  Aortic stenosis, moderate   This is a chronic health problem for this patient that appears to be quite stable.  He is having no symptomatology consistent with worsening left ear.  Dr. Terry Prajapati and I completed this visit and chart together as part of his training in [employer].  Martha Light MD  Provider /educator  [facility]          Please note that this dictation was created using voice recognition software. I have made every reasonable attempt to correct obvious errors, but I expect that there are errors of grammar and possibly content that I did not discover before finalizing the note.

## 2018-09-11 NOTE — ASSESSMENT & PLAN NOTE
Chronic stable medical condition.  Currently well controlled on Ambien.  Only getting 5 hours nightly, not using nightly.  Usually does not need on days that he gets dialysis because he is too tired.  On his days without dialysis he has more restless night. counseled on caution when using Ambien and operating vehicles or risky behavior on the following day. Obtained and reviewed patient utilization report from [facility] pharmacy database on 9/11/2018 2:04 PM  prior to writing prescription for controlled substance II, III or IV per [place] bill . Based on assessment of the report,my physical exam if necessary and the patient's health problem, the prescription is medically necessary.

## 2018-09-11 NOTE — ASSESSMENT & PLAN NOTE
This is a chronic health problem for this patient for which he continues to follow with cardiology.  He will be seeing Dr. Chicas in the coming month.  He just had an echocardiogram.  His echocardiogram shows that he has an ejection fraction of 55% with moderate aortic stenosis.  Patient denies chest pain, shortness of breath or dizziness.

## 2018-09-11 NOTE — ASSESSMENT & PLAN NOTE
Chronic well-controlled medical condition.  Still abstinent from smoking for last 9 years.  Adjusting Symbicort to 2 puffs a day.

## 2018-09-18 ENCOUNTER — OFFICE VISIT (OUTPATIENT)
Dept: CARDIOLOGY | Facility: MEDICAL CENTER | Age: 78
End: 2018-09-18
Payer: MEDICARE

## 2018-09-18 VITALS
SYSTOLIC BLOOD PRESSURE: 130 MMHG | BODY MASS INDEX: 23.95 KG/M2 | DIASTOLIC BLOOD PRESSURE: 82 MMHG | OXYGEN SATURATION: 93 % | HEIGHT: 68 IN | WEIGHT: 158 LBS | HEART RATE: 80 BPM

## 2018-09-18 DIAGNOSIS — I31.39 PERICARDIAL EFFUSION WITHOUT CARDIAC TAMPONADE: ICD-10-CM

## 2018-09-18 DIAGNOSIS — I35.0 AORTIC STENOSIS, MODERATE: ICD-10-CM

## 2018-09-18 DIAGNOSIS — I73.9 PERIPHERAL VASCULAR DISEASE (HCC): ICD-10-CM

## 2018-09-18 DIAGNOSIS — R93.1 ELEVATED CORONARY ARTERY CALCIUM SCORE: ICD-10-CM

## 2018-09-18 DIAGNOSIS — E78.5 DYSLIPIDEMIA: ICD-10-CM

## 2018-09-18 DIAGNOSIS — N18.6 END STAGE RENAL DISEASE (HCC): ICD-10-CM

## 2018-09-18 DIAGNOSIS — I10 ESSENTIAL HYPERTENSION: ICD-10-CM

## 2018-09-18 PROCEDURE — 99214 OFFICE O/P EST MOD 30 MIN: CPT | Performed by: INTERNAL MEDICINE

## 2018-09-18 NOTE — LETTER
"     [facility]   [address]  [place] [address]  Phone: [phone]  Fax: [phone]              Parmjit Castelan  1940    Encounter Date: 9/18/2018    James Mendoza M.D.          PROGRESS NOTE:  Chief Complaint   Patient presents with   • Aortic Stenosis   • HTN (Uncontrolled)       Subjective:   Parmjit Castelan is a 78 y.o. male who presents today followup of his hypertension. He also was noted to have coronary calcification on his cardiac CT in 2010. He underwent a myocardial perfusion scan December 2014 which was unremarkable. He does have end-stage renal disease and is on chronic hemodialysis.    His blood pressure is generally about 150 systolic prior to dialysis and 130 post dialysis.    He is having significant difficulty with claudication left lower extremity.  He has seen vascular surgery and is going to need repeat intervention.    He denies any chest discomfort, dyspnea or edema.  He denies any palpitations or lightheadedness.      Past Medical History:   Diagnosis Date   • Anesthesia     \"needs more sedation\" (can sometimes hear MD during procedure)    • Basal cell carcinoma of left cheek 3/26/2015   • BPH 7/14/2009   • Bronchitis 1/1/13   • CAD (coronary artery disease) 2/20/2014   • CATARACT     sanjuanita surgery complete   • Chronic respiratory failure with hypoxia ([facility]) 5/8/2016   • CKD (chronic kidney disease) stage 4, GFR 15-29 ml/min ([facility]) 1/15/2010    end stage renal disease   • COPD (chronic obstructive pulmonary disease) ([facility])    • COPD (chronic obstructive pulmonary disease) ([facility]) 8/2/2011   • Detached retina    • Dialysis     brianw,herminia humphreys   • EMPHYSEMA    • Gout    • Heart burn     occas   • High cholesterol    • HTN (hypertension) 1/15/2010   • Indigestion     occas   • Kidney cyst    • Leg pain, bilateral 8/10/2015   • On supplemental oxygen therapy    • Peripheral vascular disease ([facility]) 8/10/2015   • Pneumonia    • Primary " insomnia 3/22/2018   • Proteinuria    • Sleep apnea     O2 PER CANULA  AT NIGHT 2l/m   • Snoring      Past Surgical History:   Procedure Laterality Date   • ENDOSCOPY PROCEDURE  3/21/2018    Procedure: ENDOSCOPY PROCEDURE/UPPER;  Surgeon: Enrique Child D.O.;  Location: [facility];  Service: Gastroenterology   • COLONOSCOPY - ENDO  8/15/2016    Procedure: COLONOSCOPY - ENDO;  Surgeon: Mane Whatley M.D.;  Location: ENDOSCOPY [facility];  Service:    • GASTROSCOPY WITH BIOPSY  8/13/2016    Procedure: GASTROSCOPY WITH BIOPSY;  Surgeon: Jorge Leavitt M.D.;  Location: ENDOSCOPY [facility];  Service:    • RECOVERY  12/23/2015    Procedure: VASCULAR CASE-CASON-LEFT ARM FISTULOGRAM WITH ANGIOPLASTY;  Surgeon: Recoveryonly Surgery;  Location: SURGERY PRE-POST PROC UNIT [facility];  Service:    • RECOVERY  3/24/2015    Performed by Recoveryonly Surgery at SURGERY PRE-POST PROC UNIT [facility]   • RECOVERY  7/29/2014    Performed by Ir-Recovery Surgery at SURGERY SAME DAY [facility]   • RECOVERY  3/24/2014    Performed by Ir-Recovery Surgery at SURGERY [facility]   • RECOVERY  12/17/2013    Performed by Ir-Recovery Surgery at SURGERY SAME DAY [facility]   • RECOVERY  7/2/2013    Performed by Ir-Recovery Surgery at SURGERY SAME DAY [facility]   • AV FISTULA THROMBOLYSIS  7/2/2013    Performed by David Cason M.D. at SURGERY [facility]   • RECOVERY  1/29/2013    Performed by Ir-Recovery Surgery at SURGERY SAME DAY [facility]   • RECOVERY  7/23/2012    Performed by SURGERY, IR-RECOVERY at SURGERY SAME DAY [facility]   • VITRECTOMY POSTERIOR  10/11/2011    Performed by NAHUM GE at SURGERY SAME DAY [facility]   • RECOVERY  8/12/2011    Performed by SURGERY, IR-RECOVERY at SURGERY SAME DAY [facility]   • VITRECTOMY POSTERIOR  1/18/2011    Performed by NAHUM GE at SURGERY SAME DAY [facility]   • SCLERAL BUCKLING  1/18/2011    Performed by NAHUM GE at SURGERY  SAME DAY [facility]   • AV FISTULOGRAM  9/17/2010    Performed by ALESHIA MOSCOSO at SURGERY [facility]   • ANGIOPLASTY BALLOON  9/17/2010    Performed by ALESHIA MOSCOSO at SURGERY [facility]   • AV FISTULOGRAM  7/23/2010    Performed by ALESHIA MOSCOSO at SURGERY [facility]   • ANGIOPLASTY BALLOON  7/23/2010    Performed by ALESHIA MOSCOSO at SURGERY [facility]   • AV FISTULA REVISION  2/19/2010    Performed by WILLIE HATCH at SURGERY [facility]   • AV FISTULA CREATION  2/12/2010    Performed by ALESHIA MOSCOSO at SURGERY [facility]   • CATARACT PHACO WITH IOL  4/8/08    Performed by STACEY PHILLIPS at SURGERY SAME DAY [facility]     Family History   Problem Relation Age of Onset   • Stroke Mother    • Hypertension Mother    • Lung Disease Father         Emphysema, resp failure   • Genitourinary () Maternal Aunt         hematuria   • Hypertension Brother      Social History     Social History   • Marital status:      Spouse name: N/A   • Number of children: N/A   • Years of education: N/A     Occupational History   • Not on file.     Social History Main Topics   • Smoking status: Former Smoker     Packs/day: 1.00     Years: 40.00     Types: Cigarettes     Quit date: 1/1/2009   • Smokeless tobacco: Never Used      Comment: 1 pk a day for 35 yrs, QUIT JAN 1 2010   • Alcohol use No   • Drug use: No   • Sexual activity: No      Comment: , 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     Other Topics Concern   • Not on file     Social History Narrative   • No narrative on file     No Known Allergies  Outpatient Encounter Prescriptions as of 9/18/2018   Medication Sig Dispense Refill   • Cholecalciferol (VITAMIN D PO) Take  by mouth.     • budesonide-formoterol (SYMBICORT) 160-4.5 MCG/ACT Aerosol Inhale 2 Puffs by mouth 2 Times a Day for 90 days. 2 Inhaler 11   • zolpidem (AMBIEN) 10 MG Tab Take 1 Tab by mouth every bedtime for 90 days. 30 Tab 2   • pravastatin  "(PRAVACHOL) 20 MG Tab TAKE ONE TABLET BY MOUTH DAILY 90 Tab 3   • doxazosin (CARDURA) 4 MG Tab TAKE ONE TABLET BY MOUTH DAILY 90 Tab 0   • ROPINIRole (REQUIP) 0.5 MG Tab TAKE TWO TABLETS BY MOUTH DAILY 180 Tab 3   • clopidogrel (PLAVIX) 75 MG Tab TAKE ONE TABLET BY MOUTH DAILY 90 Tab 3   • tamsulosin (FLOMAX) 0.4 MG capsule TAKE 2 CAPSULES BY MOUTH DAILY 30 MINUTES AFTER BREAKFAST 180 Cap 3   • fluticasone (FLONASE) 50 MCG/ACT nasal spray fluticasone 50 mcg/actuation nasal spray,suspension     • lisinopril (PRINIVIL, ZESTRIL) 40 MG tablet lisinopril 40 mg tablet     • labetalol (NORMODYNE) 300 MG Tab labetalol 300 mg tablet     • traZODone (DESYREL) 150 MG Tab TAKE TWO TABLETS BY MOUTH EVERY NIGHT AT BEDTIME *DESYREL* 180 Tab 3   • furosemide (LASIX) 40 MG Tab Take 40 mg by mouth 2 Times a Day.     • Calcium Acetate, Phos Binder, 667 MG Cap TAKE TWO CAPSULES BY MOUTH THREE TIMES A DAY WITH A MEAL 180 Cap 11     No facility-administered encounter medications on file as of 9/18/2018.      ROS     Objective:   /82   Pulse 80   Ht 1.727 m (5' 8\")   Wt 71.7 kg (158 lb)   SpO2 93%   BMI 24.02 kg/m²      Physical Exam   Constitutional: He appears well-developed and well-nourished.   Neck: No JVD present.   Cardiovascular: Normal rate and regular rhythm.    Murmur (3/6 systolic at the base) heard.  Pulmonary/Chest: Effort normal and breath sounds normal. He has no rales.   Abdominal: Soft. There is no tenderness.   Musculoskeletal: He exhibits no edema.     Lab Results   Component Value Date/Time    CHOLSTRLTOT 90 (L) 06/22/2017 07:55 AM    LDL 26 06/22/2017 07:55 AM    HDL 57 06/22/2017 07:55 AM    TRIGLYCERIDE 37 06/22/2017 07:55 AM       Lab Results   Component Value Date/Time    SODIUM 142 06/28/2018 11:55 AM    POTASSIUM 4.3 06/28/2018 11:55 AM    CHLORIDE 99 06/28/2018 11:55 AM    CO2 30 06/28/2018 11:55 AM    GLUCOSE 90 06/28/2018 11:55 AM    BUN 31 (H) 06/28/2018 11:55 AM    CREATININE 5.29 (HH) " 06/28/2018 11:55 AM    CREATININE 2.1 (H) 05/06/2009 10:08 AM     Lab Results   Component Value Date/Time    ALKPHOSPHAT 74 03/23/2018 12:58 AM    ASTSGOT 38 03/23/2018 12:58 AM    ALTSGPT 28 03/23/2018 12:58 AM    TBILIRUBIN 0.7 03/23/2018 12:58 AM      Lab Results   Component Value Date/Time    BNPBTYPENAT 1583 (H) 04/15/2017 04:15 PM      Transthoracic  Echo Report    CONCLUSIONS  Normal left ventricular chamber size. Moderate concentric left   ventricular hypertrophy(LVPW 1.3 cm). Mildly reduced left ventricular   systolic function. Left ventricular ejection fraction is visually   estimated to be 55%.   Moderate pericardial effusion without evidence of hemodynamic   compromise.  Moderate aortic stenosis.  Compared to the images of the prior study done 5/2017-  there has been   no significant change in the degree of AS or pericardial effusion. LA   size has increased.   Exam Date:         08/07/2018     Assessment:     1. Elevated coronary artery calcium score     2. Aortic stenosis, moderate     3. End stage renal disease (HCC)     4. Peripheral vascular disease (HCC)     5. Dyslipidemia     6. Pericardial effusion without cardiac tamponade     7. Essential hypertension         Medical Decision Making:  Today's Assessment / Status / Plan:     Mr. Castelan is clinically stable.  He continues on hemodialysis for his end-stage renal disease.  He does have significant peripheral vascular disease.  He has not had a lipid panel recently and we will obtain one along with a CMP within the next couple of weeks.  He will follow-up in about 6 months.  His blood pressure appears to be under good control.  He does have a pericardial effusion but this has been stable and without evidence of hemodynamic compromise.  His last echocardiogram showed no significant change in his aortic stenosis.      Martha Light M.D.  [address]  [address]  [place] [address]  VIA In Basket

## 2018-09-18 NOTE — PROGRESS NOTES
"Chief Complaint   Patient presents with   • Aortic Stenosis   • HTN (Uncontrolled)       Subjective:   Parmjit Castelan is a 78 y.o. male who presents today followup of his hypertension. He also was noted to have coronary calcification on his cardiac CT in 2010. He underwent a myocardial perfusion scan December 2014 which was unremarkable. He does have end-stage renal disease and is on chronic hemodialysis.    His blood pressure is generally about 150 systolic prior to dialysis and 130 post dialysis.    He is having significant difficulty with claudication left lower extremity.  He has seen vascular surgery and is going to need repeat intervention.    He denies any chest discomfort, dyspnea or edema.  He denies any palpitations or lightheadedness.      Past Medical History:   Diagnosis Date   • Anesthesia     \"needs more sedation\" (can sometimes hear MD during procedure)    • Basal cell carcinoma of left cheek 3/26/2015   • BPH 7/14/2009   • Bronchitis 1/1/13   • CAD (coronary artery disease) 2/20/2014   • CATARACT     sanjuanita surgery complete   • Chronic respiratory failure with hypoxia ([facility]) 5/8/2016   • CKD (chronic kidney disease) stage 4, GFR 15-29 ml/min ([facility]) 1/15/2010    end stage renal disease   • COPD (chronic obstructive pulmonary disease) ([facility])    • COPD (chronic obstructive pulmonary disease) ([facility]) 8/2/2011   • Detached retina    • Dialysis     m,w,f davita   • EMPHYSEMA    • Gout    • Heart burn     occas   • High cholesterol    • HTN (hypertension) 1/15/2010   • Indigestion     occas   • Kidney cyst    • Leg pain, bilateral 8/10/2015   • On supplemental oxygen therapy    • Peripheral vascular disease ([facility]) 8/10/2015   • Pneumonia    • Primary insomnia 3/22/2018   • Proteinuria    • Sleep apnea     O2 PER CANULA  AT NIGHT 2l/m   • Snoring      Past Surgical History:   Procedure Laterality Date   • ENDOSCOPY PROCEDURE  3/21/2018    Procedure: ENDOSCOPY PROCEDURE/UPPER;  Surgeon: Enrique Child D.O.;  Location: " SURGERY [facility];  Service: Gastroenterology   • COLONOSCOPY - ENDO  8/15/2016    Procedure: COLONOSCOPY - ENDO;  Surgeon: Mane Whatley M.D.;  Location: ENDOSCOPY [facility];  Service:    • GASTROSCOPY WITH BIOPSY  8/13/2016    Procedure: GASTROSCOPY WITH BIOPSY;  Surgeon: Jorge Leavitt M.D.;  Location: ENDOSCOPY [facility];  Service:    • RECOVERY  12/23/2015    Procedure: VASCULAR CASE-CASON-LEFT ARM FISTULOGRAM WITH ANGIOPLASTY;  Surgeon: Recoveryonly Surgery;  Location: SURGERY PRE-POST PROC UNIT [facility];  Service:    • RECOVERY  3/24/2015    Performed by Recoveryonly Surgery at SURGERY PRE-POST PROC UNIT [facility]   • RECOVERY  7/29/2014    Performed by Ir-Recovery Surgery at SURGERY SAME DAY [facility]   • RECOVERY  3/24/2014    Performed by Ir-Recovery Surgery at SURGERY [facility]   • RECOVERY  12/17/2013    Performed by Ir-Recovery Surgery at SURGERY SAME DAY [facility]S   • RECOVERY  7/2/2013    Performed by Ir-Recovery Surgery at SURGERY SAME DAY [facility]   • AV FISTULA THROMBOLYSIS  7/2/2013    Performed by David Cason M.D. at SURGERY [facility]   • RECOVERY  1/29/2013    Performed by Ir-Recovery Surgery at SURGERY SAME DAY [facility]   • RECOVERY  7/23/2012    Performed by SURGERY, IR-RECOVERY at SURGERY SAME DAY [facility]   • VITRECTOMY POSTERIOR  10/11/2011    Performed by NAHUM GE at SURGERY SAME DAY [facility]   • RECOVERY  8/12/2011    Performed by SURGERY, IR-RECOVERY at SURGERY SAME DAY [facility] ORS   • VITRECTOMY POSTERIOR  1/18/2011    Performed by NAHUM GE at SURGERY SAME DAY [facility]   • SCLERAL BUCKLING  1/18/2011    Performed by NAHUM GE at SURGERY SAME DAY [facility]   • AV FISTULOGRAM  9/17/2010    Performed by DAVID CASON at SURGERY [facility]   • ANGIOPLASTY BALLOON  9/17/2010    Performed by DAVID CASON at SURGERY [facility]   • AV FISTULOGRAM  7/23/2010    Performed by BLANKA  ALESHIA OSEI at SURGERY [facility]S   • ANGIOPLASTY BALLOON  7/23/2010    Performed by ALESHIA MOSCOSO at SURGERY [facility]S   • AV FISTULA REVISION  2/19/2010    Performed by WILLIE HATCH at SURGERY [facility]S   • AV FISTULA CREATION  2/12/2010    Performed by ALESHIA MOSCOSO at SURGERY [facility]   • CATARACT PHACO WITH IOL  4/8/08    Performed by STACEY PHILLIPS at SURGERY SAME DAY [facility]     Family History   Problem Relation Age of Onset   • Stroke Mother    • Hypertension Mother    • Lung Disease Father         Emphysema, resp failure   • Genitourinary () Maternal Aunt         hematuria   • Hypertension Brother      Social History     Social History   • Marital status:      Spouse name: N/A   • Number of children: N/A   • Years of education: N/A     Occupational History   • Not on file.     Social History Main Topics   • Smoking status: Former Smoker     Packs/day: 1.00     Years: 40.00     Types: Cigarettes     Quit date: 1/1/2009   • Smokeless tobacco: Never Used      Comment: 1 pk a day for 35 yrs, QUIT JAN 1 2010   • Alcohol use No   • Drug use: No   • Sexual activity: No      Comment: , 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     Other Topics Concern   • Not on file     Social History Narrative   • No narrative on file     No Known Allergies  Outpatient Encounter Prescriptions as of 9/18/2018   Medication Sig Dispense Refill   • Cholecalciferol (VITAMIN D PO) Take  by mouth.     • budesonide-formoterol (SYMBICORT) 160-4.5 MCG/ACT Aerosol Inhale 2 Puffs by mouth 2 Times a Day for 90 days. 2 Inhaler 11   • zolpidem (AMBIEN) 10 MG Tab Take 1 Tab by mouth every bedtime for 90 days. 30 Tab 2   • pravastatin (PRAVACHOL) 20 MG Tab TAKE ONE TABLET BY MOUTH DAILY 90 Tab 3   • doxazosin (CARDURA) 4 MG Tab TAKE ONE TABLET BY MOUTH DAILY 90 Tab 0   • ROPINIRole (REQUIP) 0.5 MG Tab TAKE TWO TABLETS BY MOUTH DAILY 180 Tab 3   • clopidogrel (PLAVIX) 75 MG Tab TAKE ONE TABLET BY  "MOUTH DAILY 90 Tab 3   • tamsulosin (FLOMAX) 0.4 MG capsule TAKE 2 CAPSULES BY MOUTH DAILY 30 MINUTES AFTER BREAKFAST 180 Cap 3   • fluticasone (FLONASE) 50 MCG/ACT nasal spray fluticasone 50 mcg/actuation nasal spray,suspension     • lisinopril (PRINIVIL, ZESTRIL) 40 MG tablet lisinopril 40 mg tablet     • labetalol (NORMODYNE) 300 MG Tab labetalol 300 mg tablet     • traZODone (DESYREL) 150 MG Tab TAKE TWO TABLETS BY MOUTH EVERY NIGHT AT BEDTIME *DESYREL* 180 Tab 3   • furosemide (LASIX) 40 MG Tab Take 40 mg by mouth 2 Times a Day.     • Calcium Acetate, Phos Binder, 667 MG Cap TAKE TWO CAPSULES BY MOUTH THREE TIMES A DAY WITH A MEAL 180 Cap 11     No facility-administered encounter medications on file as of 9/18/2018.      ROS     Objective:   /82   Pulse 80   Ht 1.727 m (5' 8\")   Wt 71.7 kg (158 lb)   SpO2 93%   BMI 24.02 kg/m²     Physical Exam   Constitutional: He appears well-developed and well-nourished.   Neck: No JVD present.   Cardiovascular: Normal rate and regular rhythm.    Murmur (3/6 systolic at the base) heard.  Pulmonary/Chest: Effort normal and breath sounds normal. He has no rales.   Abdominal: Soft. There is no tenderness.   Musculoskeletal: He exhibits no edema.     Lab Results   Component Value Date/Time    CHOLSTRLTOT 90 (L) 06/22/2017 07:55 AM    LDL 26 06/22/2017 07:55 AM    HDL 57 06/22/2017 07:55 AM    TRIGLYCERIDE 37 06/22/2017 07:55 AM       Lab Results   Component Value Date/Time    SODIUM 142 06/28/2018 11:55 AM    POTASSIUM 4.3 06/28/2018 11:55 AM    CHLORIDE 99 06/28/2018 11:55 AM    CO2 30 06/28/2018 11:55 AM    GLUCOSE 90 06/28/2018 11:55 AM    BUN 31 (H) 06/28/2018 11:55 AM    CREATININE 5.29 (HH) 06/28/2018 11:55 AM    CREATININE 2.1 (H) 05/06/2009 10:08 AM     Lab Results   Component Value Date/Time    ALKPHOSPHAT 74 03/23/2018 12:58 AM    ASTSGOT 38 03/23/2018 12:58 AM    ALTSGPT 28 03/23/2018 12:58 AM    TBILIRUBIN 0.7 03/23/2018 12:58 AM      Lab Results "   Component Value Date/Time    BNPBTYPENAT 1583 (H) 04/15/2017 04:15 PM      Transthoracic  Echo Report    CONCLUSIONS  Normal left ventricular chamber size. Moderate concentric left   ventricular hypertrophy(LVPW 1.3 cm). Mildly reduced left ventricular   systolic function. Left ventricular ejection fraction is visually   estimated to be 55%.   Moderate pericardial effusion without evidence of hemodynamic   compromise.  Moderate aortic stenosis.  Compared to the images of the prior study done 5/2017-  there has been   no significant change in the degree of AS or pericardial effusion. LA   size has increased.   Exam Date:         08/07/2018     Assessment:     1. Elevated coronary artery calcium score     2. Aortic stenosis, moderate     3. End stage renal disease (HCC)     4. Peripheral vascular disease (HCC)     5. Dyslipidemia     6. Pericardial effusion without cardiac tamponade     7. Essential hypertension         Medical Decision Making:  Today's Assessment / Status / Plan:     Mr. Castelan is clinically stable.  He continues on hemodialysis for his end-stage renal disease.  He does have significant peripheral vascular disease.  He has not had a lipid panel recently and we will obtain one along with a CMP within the next couple of weeks.  He will follow-up in about 6 months.  His blood pressure appears to be under good control.  He does have a pericardial effusion but this has been stable and without evidence of hemodynamic compromise.  His last echocardiogram showed no significant change in his aortic stenosis.

## 2018-10-03 NOTE — CONSULTS
DATE OF SERVICE:  03/20/2018    REQUESTING PHYSICIAN:  Pan Smith MD    REASON FOR CONSULTATION:  To evaluate and provide dialysis for patient with   end-stage renal disease, altered mental status, and hyperkalemia.    HISTORY OF PRESENT ILLNESS:  The patient is a 77-year-old male with end-stage   renal disease, on hemodialysis, who has been receiving his dialysis treatments   on Monday, Wednesday, and Friday, not able to complete dialysis yesterday in   dialysis unit secondary to altered mental status, patient was pulling needles.    At that point, brought to the emergency room.  Also, according to his wife   prior dialysis has had a lot of episodes of vomiting, dark in color, as well   as black stools.  Upon evaluation, found hemoglobin level down to 5.6,   received already blood transfusion.  Currently, patient is alert; however, not   coherent, still confused, not able to provide history.  Noted BUN and   creatinine elevated up on 104 BUN and creatinine 7.1 and potassium 5.6.    REVIEW OF SYSTEMS:  Not possible to obtain as patient is confused.    PAST MEDICAL HISTORY:  End-stage renal disease, on hemodialysis, coronary   artery disease, bronchitis, chronic obstructive pulmonary disease,   hypertension, benign prostate hypertrophy, cataracts, indigestion,   proteinuria, and sleep apnea.    PAST SURGICAL HISTORY:  Colonoscopy, endoscopy, AV fistula creation, eye   surgery, fistulogram, and AV fistula angioplasty.    FAMILY HISTORY:  Stroke, hypertension, and lung disease.    SOCIAL HISTORY:  He used to smoke tobacco, quit in 2009.  No alcohol or drug   use.    ALLERGIES:  No known drug allergies.    OUTPATIENT MEDICATIONS:  Reviewed in the chart.    PHYSICAL EXAMINATION:  VITAL SIGNS:  Blood pressure 162/63, heart rate 92, temperature 36.4 Celsius.  GENERAL APPEARANCE:  Well-developed, well-nourished male in no acute distress.  HEENT:  Normocephalic, atraumatic.  Pupils equal, round, and reactive to   light.   HPI:    Patient ID: Ant Saucedo is a 39year old male. Low Back Pain   This is a new problem. The current episode started 1 to 4 weeks ago. The problem occurs daily. The problem has been waxing and waning since onset.  The pain is present in th Extraocular movements intact.  Nares patent.  Oropharynx clear.  Moist   mucosa, no erythema or exudate.  NECK:  Supple, no lymphadenopathy, no thyromegaly appreciated.  LUNGS:  Clear to auscultation bilaterally.  No wheezes, no rhonchi.  HEART:  Regular rate.  No rub or gallop.  ABDOMEN:  Soft, nontender, nondistended.  Bowel sounds present.  EXTREMITIES:  No cyanosis, no clubbing, no edema.  NEUROLOGIC:  Alert, following simple commands; however, incoherent, oriented   to himself, confused about the time and place.    LABORATORY RESULTS:  Reviewed revealed a hemoglobin level of 5.6.  Sodium 135,   potassium 5.6, , and creatinine 7.1.    ASSESSMENT AND PLAN:  The patient is a 77-year-old male with end-stage renal   disease, on hemodialysis, admitted with severe anemia, possible secondary to   gastrointestinal bleeding as well as altered mental status.  1.  End-stage renal disease.  We will continue daily dialysis for now.  2.  Electrolytes, hyperkalemia.  We will dialyze patient emergently tonight.  3.  Volume is well controlled.  To continue ultrafiltration with hemodialysis   as blood pressure tolerates.  4.  Anemia.  Continue blood transfusion with dialysis.  5.  Altered mental status, could be secondary to uremia, to monitor if   improvement after dialysis.  If no improvement, would proceed for neurological   evaluation with MRI.    RECOMMENDATIONS:  1.  Daily hemodialysis.  2.  Diet:  Renal.  3.  Daily monitoring of basic metabolic panel and hemoglobin level.  Blood   transfusion with dialysis.  4.  All medications adjust to renal doses.    Thank you for the consult.       ____________________________________     MD RYLEE VIRAMONTES / DELANO    DD:  03/20/2018 16:10:19  DT:  03/20/2018 16:45:26    D#:  [id number]  Job#:  [id number]   adjusting lifting repetitions. Referred for therapy.     Orders Placed This Encounter      Flulaval 0.5 ml 6 mon and older Quad single dose PF (73109)      Meds This Visit:  Requested Prescriptions     Signed Prescriptions Disp Refills   • naproxen 500 MG

## 2018-10-19 DIAGNOSIS — K25.3 ACUTE GASTRIC ULCER WITHOUT HEMORRHAGE OR PERFORATION: ICD-10-CM

## 2018-10-19 RX ORDER — OMEPRAZOLE 40 MG/1
40 CAPSULE, DELAYED RELEASE ORAL DAILY
Qty: 30 CAP | Refills: 1 | Status: SHIPPED | OUTPATIENT
Start: 2018-10-19 | End: 2018-12-11 | Stop reason: SDUPTHER

## 2018-10-23 DIAGNOSIS — I10 ESSENTIAL HYPERTENSION: ICD-10-CM

## 2018-10-25 ENCOUNTER — OFFICE VISIT (OUTPATIENT)
Dept: URGENT CARE | Facility: CLINIC | Age: 78
End: 2018-10-25
Payer: MEDICARE

## 2018-10-25 VITALS
BODY MASS INDEX: 23.95 KG/M2 | HEART RATE: 72 BPM | SYSTOLIC BLOOD PRESSURE: 150 MMHG | HEIGHT: 68 IN | RESPIRATION RATE: 18 BRPM | OXYGEN SATURATION: 95 % | DIASTOLIC BLOOD PRESSURE: 60 MMHG | TEMPERATURE: 99 F | WEIGHT: 158 LBS

## 2018-10-25 DIAGNOSIS — S81.802A NON-HEALING WOUND OF LOWER EXTREMITY, LEFT, INITIAL ENCOUNTER: ICD-10-CM

## 2018-10-25 DIAGNOSIS — I73.9 PERIPHERAL VASCULAR DISEASE (HCC): ICD-10-CM

## 2018-10-25 PROCEDURE — 99214 OFFICE O/P EST MOD 30 MIN: CPT | Performed by: PHYSICIAN ASSISTANT

## 2018-10-25 ASSESSMENT — ENCOUNTER SYMPTOMS
PALPITATIONS: 0
SENSORY CHANGE: 0
BLURRED VISION: 0
SHORTNESS OF BREATH: 0
NAUSEA: 0
CHILLS: 0
SORE THROAT: 0
VOMITING: 0
TINGLING: 0
FEVER: 0

## 2018-10-26 NOTE — PROGRESS NOTES
Subjective:      CC: Parmjit Castelan is a 78 y.o. male who presents with Wound Check (Few wks left ankle sore .)      HPI:  This is a new problem. Parmjit Castelan is a 78 y.o. male who presents today for evaluation of left ankle wound.  Patient states that he has peripheral arterial disease.  He says he had a wound on the front of his left ankle for approximately 2 months now he says one of his doctors tried Keflex and he has also been putting triamcinolone cream on daily.  He says that he had an appointment yesterday with his surgeon that is going to be performing a procedure for his PAD and states that it did not look infected to that provider.  He states that he is just frustrated with the fact that it has not healed and spent so long.  He denies fever/chills, chest pain, shortness of breath, or any increased numbness/tingling the affected area.        Review of Systems   Constitutional: Negative for chills and fever.   HENT: Negative for sore throat.    Eyes: Negative for blurred vision.   Respiratory: Negative for shortness of breath.    Cardiovascular: Negative for chest pain and palpitations.   Gastrointestinal: Negative for nausea and vomiting.   Musculoskeletal: Negative for joint pain.   Skin:        Nonhealing wound on the front of left ankle   Neurological: Negative for tingling and sensory change.         PMH:  has a past medical history of Anesthesia; Basal cell carcinoma of left cheek (3/26/2015); BPH (7/14/2009); Bronchitis (1/1/13); CAD (coronary artery disease) (2/20/2014); CATARACT; Chronic respiratory failure with hypoxia ([facility]) (5/8/2016); CKD (chronic kidney disease) stage 4, GFR 15-29 ml/min ([facility]) (1/15/2010); COPD (chronic obstructive pulmonary disease) ([facility]); COPD (chronic obstructive pulmonary disease) ([facility]) (8/2/2011); Detached retina; Dialysis; EMPHYSEMA; Gout; Heart burn; High cholesterol; HTN (hypertension) (1/15/2010); Indigestion; Kidney cyst; Leg pain, bilateral (8/10/2015); On  supplemental oxygen therapy; Peripheral vascular disease (HCC) (8/10/2015); Pneumonia; Primary insomnia (3/22/2018); Proteinuria; Sleep apnea; and Snoring. He also has no past medical history of Liver disease.  MEDS:   Current Outpatient Prescriptions:   •  omeprazole (PRILOSEC) 40 MG delayed-release capsule, Take 1 Cap by mouth every day., Disp: 30 Cap, Rfl: 1  •  Cholecalciferol (VITAMIN D PO), Take  by mouth., Disp: , Rfl:   •  budesonide-formoterol (SYMBICORT) 160-4.5 MCG/ACT Aerosol, Inhale 2 Puffs by mouth 2 Times a Day for 90 days., Disp: 2 Inhaler, Rfl: 11  •  zolpidem (AMBIEN) 10 MG Tab, Take 1 Tab by mouth every bedtime for 90 days., Disp: 30 Tab, Rfl: 2  •  pravastatin (PRAVACHOL) 20 MG Tab, TAKE ONE TABLET BY MOUTH DAILY, Disp: 90 Tab, Rfl: 3  •  doxazosin (CARDURA) 4 MG Tab, TAKE ONE TABLET BY MOUTH DAILY, Disp: 90 Tab, Rfl: 0  •  ROPINIRole (REQUIP) 0.5 MG Tab, TAKE TWO TABLETS BY MOUTH DAILY, Disp: 180 Tab, Rfl: 3  •  clopidogrel (PLAVIX) 75 MG Tab, TAKE ONE TABLET BY MOUTH DAILY, Disp: 90 Tab, Rfl: 3  •  tamsulosin (FLOMAX) 0.4 MG capsule, TAKE 2 CAPSULES BY MOUTH DAILY 30 MINUTES AFTER BREAKFAST, Disp: 180 Cap, Rfl: 3  •  fluticasone (FLONASE) 50 MCG/ACT nasal spray, fluticasone 50 mcg/actuation nasal spray,suspension, Disp: , Rfl:   •  lisinopril (PRINIVIL, ZESTRIL) 40 MG tablet, lisinopril 40 mg tablet, Disp: , Rfl:   •  labetalol (NORMODYNE) 300 MG Tab, labetalol 300 mg tablet, Disp: , Rfl:   •  traZODone (DESYREL) 150 MG Tab, TAKE TWO TABLETS BY MOUTH EVERY NIGHT AT BEDTIME *DESYREL*, Disp: 180 Tab, Rfl: 3  •  furosemide (LASIX) 40 MG Tab, Take 40 mg by mouth 2 Times a Day., Disp: , Rfl:   •  Calcium Acetate, Phos Binder, 667 MG Cap, TAKE TWO CAPSULES BY MOUTH THREE TIMES A DAY WITH A MEAL, Disp: 180 Cap, Rfl: 11  ALLERGIES: No Known Allergies  SURGHX:   Past Surgical History:   Procedure Laterality Date   • ENDOSCOPY PROCEDURE  3/21/2018    Procedure: ENDOSCOPY PROCEDURE/UPPER;  Surgeon: Enrique  TATO Child;  Location: SURGERY [facility];  Service: Gastroenterology   • COLONOSCOPY - ENDO  8/15/2016    Procedure: COLONOSCOPY - ENDO;  Surgeon: Mane Whatley M.D.;  Location: ENDOSCOPY [facility];  Service:    • GASTROSCOPY WITH BIOPSY  8/13/2016    Procedure: GASTROSCOPY WITH BIOPSY;  Surgeon: Jorge Leavitt M.D.;  Location: ENDOSCOPY [facility];  Service:    • RECOVERY  12/23/2015    Procedure: VASCULAR CASE-CASON-LEFT ARM FISTULOGRAM WITH ANGIOPLASTY;  Surgeon: Recoveryonly Surgery;  Location: SURGERY PRE-POST PROC UNIT [facility];  Service:    • RECOVERY  3/24/2015    Performed by Recoveryonly Surgery at SURGERY PRE-POST PROC UNIT [facility]   • RECOVERY  7/29/2014    Performed by Ir-Recovery Surgery at SURGERY SAME DAY [facility]   • RECOVERY  3/24/2014    Performed by Ir-Recovery Surgery at SURGERY [facility]   • RECOVERY  12/17/2013    Performed by Ir-Recovery Surgery at SURGERY SAME DAY [facility]   • RECOVERY  7/2/2013    Performed by Ir-Recovery Surgery at SURGERY SAME DAY [facility]   • AV FISTULA THROMBOLYSIS  7/2/2013    Performed by David Cason M.D. at SURGERY [facility]   • RECOVERY  1/29/2013    Performed by Ir-Recovery Surgery at SURGERY SAME DAY [facility]   • RECOVERY  7/23/2012    Performed by SURGERY, IR-RECOVERY at SURGERY SAME DAY [facility]   • VITRECTOMY POSTERIOR  10/11/2011    Performed by NAHUM GE at SURGERY SAME DAY [facility]   • RECOVERY  8/12/2011    Performed by SURGERY, IR-RECOVERY at SURGERY SAME DAY [facility]   • VITRECTOMY POSTERIOR  1/18/2011    Performed by NAHUM GE at SURGERY SAME DAY [facility]   • SCLERAL BUCKLING  1/18/2011    Performed by NAHUM GE at SURGERY SAME DAY [facility]   • AV FISTULOGRAM  9/17/2010    Performed by DAVID CASON at SURGERY [facility]   • ANGIOPLASTY BALLOON  9/17/2010    Performed by DAVID CASON at SURGERY [facility]   • AV FISTULOGRAM  7/23/2010     "Performed by ALESHIA MOSCOSO at SURGERY [facility]S   • ANGIOPLASTY BALLOON  7/23/2010    Performed by ALESHIA MOSCOSO at SURGERY [facility]   • AV FISTULA REVISION  2/19/2010    Performed by WILLIE HATCH at SURGERY [facility]   • AV FISTULA CREATION  2/12/2010    Performed by ALESHIA MOSCOSO at SURGERY [facility]S   • CATARACT PHACO WITH IOL  4/8/08    Performed by STACEY PHILLIPS at SURGERY SAME DAY [facility]     SOCHX:  reports that he quit smoking about 9 years ago. His smoking use included Cigarettes. He has a 40.00 pack-year smoking history. He has never used smokeless tobacco. He reports that he does not drink alcohol or use drugs.  FH: Family history was reviewed, no pertinent findings to report       Objective:     /60 (BP Location: Left arm, Patient Position: Sitting, BP Cuff Size: Adult)   Pulse 72   Temp 37.2 °C (99 °F) (Temporal)   Resp 18   Ht 1.727 m (5' 8\")   Wt 71.7 kg (158 lb)   SpO2 95%   BMI 24.02 kg/m²        Physical Exam   Constitutional: He is oriented to person, place, and time. He appears well-developed and well-nourished.   HENT:   Head: Normocephalic and atraumatic.   Right Ear: External ear normal.   Left Ear: External ear normal.   Eyes: Pupils are equal, round, and reactive to light. Conjunctivae are normal.   Cardiovascular: Normal rate, regular rhythm and normal heart sounds.    No murmur heard.  Pulmonary/Chest: Effort normal and breath sounds normal. He has no wheezes.   Neurological: He is alert and oriented to person, place, and time.   Skin: Skin is warm and dry. Capillary refill takes less than 2 seconds.        There is a small, circumferential 3 to the ulcer just superior to the left ankle.  There is some minor purple discoloration surrounding it but no erythema and no warmth.  The bed of the ulcer shows some yellow granulation tissue.  It is mildly tender to palpation.   Psychiatric: He has a normal mood and affect. His " behavior is normal. Judgment normal.          Assessment/Plan:     1. Peripheral vascular disease (HCC)  - REFERRAL TO WOUND CLINIC    2. Non-healing wound of lower extremity, left, initial encounter  - REFERRAL TO WOUND CLINIC  - Xeroform dressing was applied in the office today  -Advised patient to stop using triamcinolone cream    Differential Diagnosis, natural history, and supportive care discussed. Return to the Urgent Care or follow up with your PCP if symptoms fail to resolve, or for any new or worsening symptoms. Emergency room precautions discussed. Patient and/or family appears understanding of information.

## 2018-10-30 DIAGNOSIS — I10 ESSENTIAL HYPERTENSION: Primary | ICD-10-CM

## 2018-10-30 RX ORDER — LABETALOL 300 MG/1
TABLET, FILM COATED ORAL
Qty: 360 TAB | Refills: 3 | Status: SHIPPED | OUTPATIENT
Start: 2018-10-30 | End: 2019-06-12

## 2018-10-31 RX ORDER — DOXAZOSIN MESYLATE 4 MG/1
TABLET ORAL
Qty: 90 TAB | Refills: 3 | Status: ON HOLD | OUTPATIENT
Start: 2018-10-31 | End: 2019-01-25

## 2018-11-01 ENCOUNTER — NON-PROVIDER VISIT (OUTPATIENT)
Dept: WOUND CARE | Facility: MEDICAL CENTER | Age: 78
End: 2018-11-01
Attending: PHYSICIAN ASSISTANT
Payer: MEDICARE

## 2018-11-01 PROCEDURE — 99201 HCHG LEVEL I NEW PATIENT: CPT

## 2018-11-01 PROCEDURE — 99211 OFF/OP EST MAY X REQ PHY/QHP: CPT

## 2018-11-01 NOTE — CERTIFICATION
"Advanced Wound Care  [facility]  [address]  [address]  [place] [address]  [phone] Fax: [phone]      Initial Evaluation  For Certification Period: 11/01/2018 - 01/26/2019  Start of Care: 11/01/2018          Referring Physician: Keyla Clifford P.A.-C.  Primary Physician:     Martha Light M.D.  Consulting Physicians:         Wound(s): LLE/anterior ankle  Pharmacy of Choice:        Subjective:        HPI: Pt presents with wound to LLE/anterior ankle wound. Pt states wound began \"several monts ago\" from a pair off ill fitting shoes. Pt has been dressing with Neosporin and bandage. Pt has a PMH of PAD and smoking x40 years. Does not currently smoke. Reports claudication pains. Pt expecting a procedure with Dr. Cason whom has done vascular surgeries on patient in the past. There is no scheduled date for this procedure as of 11/01/2018.             Pain:  8-9/10.           Past Medical History:  Past Medical History:   Diagnosis Date   • Anesthesia     \"needs more sedation\" (can sometimes hear MD during procedure)    • Basal cell carcinoma of left cheek 3/26/2015   • BPH 7/14/2009   • Bronchitis 1/1/13   • CAD (coronary artery disease) 2/20/2014   • CATARACT     sanjuanita surgery complete   • Chronic respiratory failure with hypoxia ([facility]) 5/8/2016   • CKD (chronic kidney disease) stage 4, GFR 15-29 ml/min ([facility]) 1/15/2010    end stage renal disease   • COPD (chronic obstructive pulmonary disease) ([facility])    • COPD (chronic obstructive pulmonary disease) ([facility]) 8/2/2011   • Detached retina    • Dialysis     m,w,f juliann   • EMPHYSEMA    • Gout    • Heart burn     occas   • High cholesterol    • HTN (hypertension) 1/15/2010   • Indigestion     occas   • Kidney cyst    • Leg pain, bilateral 8/10/2015   • On supplemental oxygen therapy    • Peripheral vascular disease (HCC) 8/10/2015   • Pneumonia    • Primary insomnia 3/22/2018   • Proteinuria    • Sleep apnea     O2 PER CANULA  AT NIGHT 2l/m   • " Snoring        Current Medications:  Current Outpatient Prescriptions:   •  doxazosin (CARDURA) 4 MG Tab, TAKE ONE TABLET BY MOUTH DAILY, Disp: 90 Tab, Rfl: 3  •  labetalol (NORMODYNE) 300 MG Tab, TAKE TWO TABLETS BY MOUTH TWICE A DAY (NORMODYNE), Disp: 360 Tab, Rfl: 3  •  omeprazole (PRILOSEC) 40 MG delayed-release capsule, Take 1 Cap by mouth every day., Disp: 30 Cap, Rfl: 1  •  Cholecalciferol (VITAMIN D PO), Take  by mouth., Disp: , Rfl:   •  budesonide-formoterol (SYMBICORT) 160-4.5 MCG/ACT Aerosol, Inhale 2 Puffs by mouth 2 Times a Day for 90 days., Disp: 2 Inhaler, Rfl: 11  •  zolpidem (AMBIEN) 10 MG Tab, Take 1 Tab by mouth every bedtime for 90 days., Disp: 30 Tab, Rfl: 2  •  pravastatin (PRAVACHOL) 20 MG Tab, TAKE ONE TABLET BY MOUTH DAILY, Disp: 90 Tab, Rfl: 3  •  ROPINIRole (REQUIP) 0.5 MG Tab, TAKE TWO TABLETS BY MOUTH DAILY, Disp: 180 Tab, Rfl: 3  •  clopidogrel (PLAVIX) 75 MG Tab, TAKE ONE TABLET BY MOUTH DAILY, Disp: 90 Tab, Rfl: 3  •  fluticasone (FLONASE) 50 MCG/ACT nasal spray, fluticasone 50 mcg/actuation nasal spray,suspension, Disp: , Rfl:   •  lisinopril (PRINIVIL, ZESTRIL) 40 MG tablet, lisinopril 40 mg tablet, Disp: , Rfl:   •  labetalol (NORMODYNE) 300 MG Tab, labetalol 300 mg tablet, Disp: , Rfl:   •  traZODone (DESYREL) 150 MG Tab, TAKE TWO TABLETS BY MOUTH EVERY NIGHT AT BEDTIME *DESYREL*, Disp: 180 Tab, Rfl: 3  •  furosemide (LASIX) 40 MG Tab, Take 40 mg by mouth 2 Times a Day., Disp: , Rfl:   •  Calcium Acetate, Phos Binder, 667 MG Cap, TAKE TWO CAPSULES BY MOUTH THREE TIMES A DAY WITH A MEAL, Disp: 180 Cap, Rfl: 11    Allergies: No Known Allergies    Past Surgical History:   Past Surgical History:   Procedure Laterality Date   • ENDOSCOPY PROCEDURE  3/21/2018    Procedure: ENDOSCOPY PROCEDURE/UPPER;  Surgeon: Enrique Child D.O.;  Location: SURGERY [facility];  Service: Gastroenterology   • COLONOSCOPY - ENDO  8/15/2016    Procedure: COLONOSCOPY - ENDO;  Surgeon: Mane Whatley,  M.D.;  Location: ENDOSCOPY [facility];  Service:    • GASTROSCOPY WITH BIOPSY  8/13/2016    Procedure: GASTROSCOPY WITH BIOPSY;  Surgeon: Jorge Leavitt M.D.;  Location: ENDOSCOPY [facility];  Service:    • RECOVERY  12/23/2015    Procedure: VASCULAR CASE-BLANKA-LEFT ARM FISTULOGRAM WITH ANGIOPLASTY;  Surgeon: Recoveryonbhavani Surgery;  [place] [facility] [place];  Service:    • RECOVERY  3/24/2015    Performed by [facility] at SUR[place] [facility] [facility] [place]   • RECOVERY  7/29/2014    Performed by [facility] Surgery at SURGERY SAME DAY [facility] ORS   • RECOVERY  3/24/2014    Performed by [facility] at SURGERY [facility]   • RECOVERY  12/17/2013    Performed by [facility] Surgery at SURGERY SAME DAY [facility] ORS   • RECOVERY  7/2/2013    Performed by [facility] at SURGERY SAME DAY [facility] ORS   • AV FISTULA THROMBOLYSIS  7/2/2013    Performed by David Cason M.D. at SURGERY [facility]   • RECOVERY  1/29/2013    Performed by [facility] Surgery at SURGERY SAME DAY [facility] ORS   • RECOVERY  7/23/2012    Performed by SURGERY, IR-RECOVERY at SURGERY SAME DAY [facility] ORS   • VITRECTOMY POSTERIOR  10/11/2011    Performed by NAHUM GE at SURGERY SAME DAY [facility] ORS   • RECOVERY  8/12/2011    Performed by SURGERY, IR-RECOVERY at SURGERY SAME DAY [facility] ORS   • VITRECTOMY POSTERIOR  1/18/2011    Performed by NAHUM GE at SURGERY SAME DAY [facility] ORS   • SCLERAL BUCKLING  1/18/2011    Performed by NAHUM GE at SURGERY SAME DAY [facility] ORS   • AV FISTULOGRAM  9/17/2010    Performed by DAVID CASON at SURGERY [facility]   • ANGIOPLASTY BALLOON  9/17/2010    Performed by DAVID CASON at SURGERY [facility]   • AV FISTULOGRAM  7/23/2010    Performed by DAVID CASON at SURGERY [facility]   • ANGIOPLASTY BALLOON  7/23/2010    Performed by DAVID CASON at SURGERY [facility]   • AV FISTULA  REVISION  2/19/2010    Performed by WILLIE HATCH at SURGERY [facility] ORS   • AV FISTULA CREATION  2/12/2010    Performed by ALESHIA MOSCOSO at SURGERY [facility] ORS   • CATARACT PHACO WITH IOL  4/8/08    [facility] STACEY PHILLIPS [facility]       Social History:   Social History     Social History   • Marital status:      Spouse name: N/A   • Number of children: N/A   • Years of education: N/A     Occupational History   • Not on file.     Social History Main Topics   • Smoking status: Former Smoker     Packs/day: 1.00     Years: 40.00     Types: Cigarettes     Quit date: 1/1/2009   • Smokeless tobacco: Never Used      Comment: 1 pk a day for 35 yrs, QUIT JAN 1 2010   • Alcohol use No   • Drug use: No   • Sexual activity: No      Comment: , 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     Other Topics Concern   • Not on file     Social History Narrative   • No narrative on file             Objective:      Tests and Measures:  11/01/2018     Left   PT 64   Brachial 162 (taken on R arm, pt has AV fistula on L)   XAVI 0.4       Orthotic, protective, supportive devices: none    Fall Risk Assessment (quan all that apply with an X):  Completed at initial eval on 11/01/2018   X 65 years or older     Fall within the last 2 years, uses   Ambulatory devices  Loss of protective sensation in feet,   Use of prostethic/orthotic, years    Presence of lower extremity/foot/toe amputation   Taking medication that increases risk (per facility policy)    Interventions Recommended (if any of the above are selected):   Use of Assistive Device:________________________   Supervision with ambulation:  Independent   Assistance with ambulation:  Independent   Home safety education:  Educational material provided        Wound 11/01/18 Arterial Ulcer LLE/anterior ankle (Active)   Wound Image   11/1/2018 11:00 AM   Site Assessment Yellow 11/1/2018 11:00 AM   Agata-wound Assessment Intact 11/1/2018 11:00 AM    Margins Attached edges 11/1/2018 11:00 AM   Wound Length (cm) 0.5 cm 11/1/2018 11:00 AM   Wound Width (cm) 0.7 cm 11/1/2018 11:00 AM   Wound Depth (cm) 0 cm 11/1/2018 11:00 AM   Wound Surface Area (cm^2) 0.35 cm^2 11/1/2018 11:00 AM   Closure Secondary intention 11/1/2018 11:00 AM   Drainage Amount Scant 11/1/2018 11:00 AM   Drainage Description Serous 11/1/2018 11:00 AM   Treatments Cleansed 11/1/2018 11:00 AM   Cleansing Normal Saline Irrigation 11/1/2018 11:00 AM   Periwound Protectant Skin Protectant wipes to Periwound 11/1/2018 11:00 AM   Dressing Options Honey Colloid;Nonadhesive Foam;Hypafix Tape 11/1/2018 11:00 AM   Dressing Changed New 11/1/2018 11:00 AM   Dressing Status Clean;Dry;Intact 11/1/2018 11:00 AM   WOUND NURSE ONLY - Odor None 11/1/2018 11:00 AM   WOUND NURSE ONLY - Pulses DP;PT;Not palpable 11/1/2018 11:00 AM   WOUND NURSE ONLY - Exposed Structures None 11/1/2018 11:00 AM   WOUND NURSE ONLY - Tissue Type and Percentage 100% dry yellow 11/1/2018 11:00 AM        Patient Education: Discussed plan of care and rationale for dressing selection. Discussed implications of poor arterial status and need to wear footwear that completely covers feet so as to protect from accidentally causing new wounds. Instructed patient to inform [facility] when he schedules vascular procedure with Dr. Cason. Instructed pt on s/s infection - chills, fever, malaise, NV, increased redness/swelling/pain/exudate - and to go to ER/Urgent Care; to keep dressing D/I and to return to [facility] 2x/week for wound care. Pt agreeable to plan of care and verbalizes understanding of all instruction.     Professional Collaboration: Initial evaluation sent to referring provider via Epic.      Assessment:      Wound etiology: Arterial, trauma    Wound Progress:  Initial Evaluation    Rationale for Treatment: Honey colloid to maintain moist wound bed, to lower pH for wound healing and to facilitate autolytic debridement. Foam to  pad/protect.    Patient tolerance/compliance: Pt tolerated treatment well and listened intently to all instruction.     Complicating factors: Poor arterial status    Need for ongoing Advanced Wound Care services:Patient requires skilled therapeutic wound care services for product selection, application of product, debridement, close monitoring with clinical assessment for expedite of wound healing.     Plan:      Treatment Plan and Recommendations:  Diagnosis/ICD10:   I73.9 (ICD-10-CM) - Peripheral vascular disease (HCC)   S81.802A (ICD-10-CM) - Non-healing wound of lower extremity, left, initial encounter     Procedures/CPT: Level I visit new - [id number]    Frequency: 2x/week - 30 minutes

## 2018-11-07 NOTE — PROGRESS NOTES
XAVI of left lower extremity performed in clinic today by wound nurse,  Lila Wahl, RN.   Results:     PT 64 mmHg    right brachial 162 mm Hg (A-V fistula on LLE)      XAVI= 0.4    Lower extremity perfusion is not adequate for multilayer compression.     Per Erin, patient is seen by Dr. David Cason for vascular issues, procedure planned in the next few weeks

## 2018-11-08 ENCOUNTER — OFFICE VISIT (OUTPATIENT)
Dept: MEDICAL GROUP | Facility: MEDICAL CENTER | Age: 78
End: 2018-11-08
Payer: MEDICARE

## 2018-11-08 VITALS
RESPIRATION RATE: 14 BRPM | TEMPERATURE: 99.2 F | SYSTOLIC BLOOD PRESSURE: 120 MMHG | HEART RATE: 90 BPM | DIASTOLIC BLOOD PRESSURE: 63 MMHG | HEIGHT: 68 IN | WEIGHT: 158 LBS | BODY MASS INDEX: 23.95 KG/M2 | OXYGEN SATURATION: 94 %

## 2018-11-08 DIAGNOSIS — N18.6 END STAGE RENAL DISEASE (HCC): ICD-10-CM

## 2018-11-08 DIAGNOSIS — L97.909 ARTERIAL LEG ULCER (HCC): ICD-10-CM

## 2018-11-08 DIAGNOSIS — I73.9 PERIPHERAL VASCULAR DISEASE (HCC): ICD-10-CM

## 2018-11-08 PROCEDURE — 99214 OFFICE O/P EST MOD 30 MIN: CPT | Performed by: FAMILY MEDICINE

## 2018-11-08 RX ORDER — CALCIUM ACETATE 667 MG/1
CAPSULE ORAL
Qty: 390 CAP | Refills: 11 | Status: SHIPPED | OUTPATIENT
Start: 2018-11-08 | End: 2019-06-12

## 2018-11-09 NOTE — PROGRESS NOTES
CC:Diagnoses of Peripheral vascular disease (HCC), End stage renal disease (HCC), and Arterial leg ulcer (HCC) were pertinent to this visit.      HISTORY OF PRESENT ILLNESS: Patient is a 78 y.o. male established patient who presents today to to discuss his problems listed below.  A great deal of time for this patient was spent on trying to coordinate care as well as trying to educate the patient as to the lesion he had and why it is so concerning.    Health Maintenance: Completed    Peripheral vascular disease (HCC)  This is a chronic health problem for this patient that he presents today telling me he has a lesion on his left ankle that he is having difficulty getting it to heal.  It actually also turns out that he has a blister over the fibular prominence as well.  Patient tells me that he was seen in Dr. Cason's office (vascular surgery) within this last week and had an ultrasound done on his left leg because he needs some type of a procedure to restore adequate peripheral vasculature.  At this point I believe the reason his wound is not healing is because of his peripheral vascular disease.  Patient had been seen in the wound clinic and they had helped him and actually his lesion does look better than the picture that is located within his chart.  Patient cannot comply with the frequency of visits that the wound clinic was asking for and so therefore he canceled all of his visits.  I talked with him about the fact that we have to negotiate and work with his schedule and wound clinic to try and figure out how to help him.  I then called wound clinic and we got him set up for Tuesday Thursday appointments because on Monday Wednesday Fridays he has kidney dialysis.  We also had a fabiana discussion about the fact that if he does not able to get the vascular surgery he may very well end up with a BKA because of poor circulation in his left lower extremity.    End stage renal disease (HCC)  This is a chronic health  problem for this patient for which she is on dialysis on Mondays, Wednesdays, and Fridays.  The problem for him is that he also has a wound that needs to go to the wound center.  Patient was not going to keep his appointments because of his dialysis schedule.  Were going to work between the 2 understanding how important it is for him to keep his dialysis appointments.  Patient ultimately agreed.    Arterial leg ulcer (HCC)  This is a new problem for this patient that has occurred over the last 2 weeks.  Patient thought that he had injured his leg and had a nonhealing wound.  He was seen in the urgent care who then arranged for him to go to the wound clinic.  He was seen there and identified as having an arterial ulcer.  He is here today because he canceled all of those appointments thinking that he could not make them fit his schedule.  He was also concerned because this wound is so painful.  I believe his wound is painful because it is due to poor circulation.  He also is having poor healing secondary to the poor circulation.  I finally convinced the patient to go back to the wound center and have them work with him until such time as he can have a vascular procedure to try and restore circulation to the left leg.      Patient Active Problem List    Diagnosis Date Noted   • Elevated coronary artery calcium score 02/20/2014     Priority: High   • Pericardial effusion without cardiac tamponade 04/20/2017     Priority: Medium   • Aortic stenosis, moderate 04/20/2017     Priority: Medium   • Dyslipidemia 12/05/2014     Priority: Low   • BPH (benign prostatic hypertrophy) 07/14/2009     Priority: Low   • Arterial leg ulcer (HCC) 11/08/2018   • Acute gastric ulcer 10/19/2018   • Primary insomnia 03/22/2018   • Gastric polyps 03/21/2018   • Esophagitis 03/21/2018   • Uremia 03/20/2018   • Nocturnal hypoxemia 03/21/2017   • Pulmonary hypertension (HCC) 03/21/2017   • Pedal edema 03/21/2017   • Dyspnea on exertion  03/16/2017   • Need for prophylactic vaccination with combined vaccine 09/08/2016   • AVM (arteriovenous malformation) 09/08/2016   • Systolic CHF, acute ([facility]) 05/11/2016   • Essential hypertension 05/11/2016   • Hyperkalemia 05/11/2016   • Renal cyst 05/11/2016   • Chronic respiratory failure with hypoxia ([facility]) 05/08/2016   • Hyperkalemia, diminished renal excretion 05/08/2016   • Pulmonary edema 05/08/2016   • Leukocytosis 05/08/2016   • ESRD (end stage renal disease) on dialysis ([facility]) 05/08/2016   • Uric acid arthropathy 01/07/2016   • Primary osteoarthritis of both hands 08/20/2015   • Peripheral vascular disease ([facility]) 08/10/2015   • Anemia in CKD (chronic kidney disease) 05/29/2015   • Basal cell carcinoma of left cheek 03/26/2015   • End stage renal disease ([facility]) 03/24/2014   • 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[facility]) 07/02/2013   • Kidney damage 01/29/2013   • Secondary renal hyperparathyroidism ([facility]) 12/14/2012   • COPD (chronic obstructive pulmonary disease) ([facility]) 08/02/2011   • HELGA (obstructive sleep apnea) 05/13/2011   • Hard of hearing 04/22/2011   • Preventative health care 04/22/2011      Allergies:Patient has no known allergies.    Current Outpatient Prescriptions   Medication Sig Dispense Refill   • Calcium Acetate, Phos Binder, 667 MG Cap TAKE 3 CAPSULES BY MOUTH WITH MEALS  AND 2 CAPSULES BY MOUTH WITH SNACKS (2 SNACKS) 390 Cap 11   • doxazosin (CARDURA) 4 MG Tab TAKE ONE TABLET BY MOUTH DAILY 90 Tab 3   • labetalol (NORMODYNE) 300 MG Tab TAKE TWO TABLETS BY MOUTH TWICE A DAY (NORMODYNE) 360 Tab 3   • omeprazole (PRILOSEC) 40 MG delayed-release capsule Take 1 Cap by mouth every day. 30 Cap 1   • Cholecalciferol (VITAMIN D PO) Take  by mouth.     • budesonide-formoterol (SYMBICORT) 160-4.5 MCG/ACT Aerosol Inhale 2 Puffs by mouth 2 Times a Day for 90 days. 2 Inhaler 11   • zolpidem (AMBIEN) 10 MG Tab Take 1 Tab by mouth every bedtime for 90 days. 30 Tab 2   • pravastatin (PRAVACHOL) 20 MG Tab TAKE ONE TABLET BY  "MOUTH DAILY 90 Tab 3   • ROPINIRole (REQUIP) 0.5 MG Tab TAKE TWO TABLETS BY MOUTH DAILY 180 Tab 3   • clopidogrel (PLAVIX) 75 MG Tab TAKE ONE TABLET BY MOUTH DAILY 90 Tab 3   • fluticasone (FLONASE) 50 MCG/ACT nasal spray fluticasone 50 mcg/actuation nasal spray,suspension     • lisinopril (PRINIVIL, ZESTRIL) 40 MG tablet lisinopril 40 mg tablet     • traZODone (DESYREL) 150 MG Tab TAKE TWO TABLETS BY MOUTH EVERY NIGHT AT BEDTIME *DESYREL* 180 Tab 3   • furosemide (LASIX) 40 MG Tab Take 40 mg by mouth 2 Times a Day.       No current facility-administered medications for this visit.        Social History   Substance Use Topics   • Smoking status: Former Smoker     Packs/day: 1.00     Years: 40.00     Types: Cigarettes     Quit date: 1/1/2009   • Smokeless tobacco: Never Used      Comment: 1 pk a day for 35 yrs, QUIT JAN 1 2010   • Alcohol use No     Social History     Social History Narrative   • No narrative on file       Family History   Problem Relation Age of Onset   • Stroke Mother    • Hypertension Mother    • Lung Disease Father         Emphysema, resp failure   • Genitourinary () Maternal Aunt         hematuria   • Hypertension Brother            Exam:    Blood pressure 120/63, pulse 90, temperature 37.3 °C (99.2 °F), temperature source Temporal, resp. rate 14, height 1.727 m (5' 8\"), weight 71.7 kg (158 lb), SpO2 94 %. Body mass index is 24.02 kg/m².    General:  Well nourished, well developed male in NAD      Patient was seen for 30 minutes face to face of which, 25 minutes was spent counseling regarding the above mentioned problems, along with coordinating care for his next appointments at the wound center.  Educating the patient as to what the lesion is and why he has the lesion and the fact that I have nothing to help with the pain.  I think the patient needs to make certain that he gets seen back at vascular surgery as soon as possible to proceed with some type of a revascularization " procedure..  Assessment/Plan:  1. Peripheral vascular disease (HCC)  Uncontrolled, patient awaiting the return of Dr. Cason for revascularization.  Patient will continue to follow with his office.    2. End stage renal disease (HCC)  Adequately controlled with renal dialysis 3 days a week.    3. Arterial leg ulcer (HCC)  Uncontrolled, patient's lesion is stable and not enlarging but it is definitely not healing and he has a new lesion over the fibular prominence.  Patient will be seen in the wound clinic on Tuesday 11/13 for continued treatment.

## 2018-11-09 NOTE — ASSESSMENT & PLAN NOTE
This is a chronic health problem for this patient that he presents today telling me he has a lesion on his left ankle that he is having difficulty getting it to heal.  It actually also turns out that he has a blister over the fibular prominence as well.  Patient tells me that he was seen in Dr. Cason's office (vascular surgery) within this last week and had an ultrasound done on his left leg because he needs some type of a procedure to restore adequate peripheral vasculature.  At this point I believe the reason his wound is not healing is because of his peripheral vascular disease.  Patient had been seen in the wound clinic and they had helped him and actually his lesion does look better than the picture that is located within his chart.  Patient cannot comply with the frequency of visits that the wound clinic was asking for and so therefore he canceled all of his visits.  I talked with him about the fact that we have to negotiate and work with his schedule and wound clinic to try and figure out how to help him.  I then called wound clinic and we got him set up for Tuesday Thursday appointments because on Monday Wednesday Fridays he has kidney dialysis.  We also had a fabiana discussion about the fact that if he does not able to get the vascular surgery he may very well end up with a BKA because of poor circulation in his left lower extremity.

## 2018-11-09 NOTE — ASSESSMENT & PLAN NOTE
This is a new problem for this patient that has occurred over the last 2 weeks.  Patient thought that he had injured his leg and had a nonhealing wound.  He was seen in the urgent care who then arranged for him to go to the wound clinic.  He was seen there and identified as having an arterial ulcer.  He is here today because he canceled all of those appointments thinking that he could not make them fit his schedule.  He was also concerned because this wound is so painful.  I believe his wound is painful because it is due to poor circulation.  He also is having poor healing secondary to the poor circulation.  I finally convinced the patient to go back to the wound center and have them work with him until such time as he can have a vascular procedure to try and restore circulation to the left leg.

## 2018-11-09 NOTE — ASSESSMENT & PLAN NOTE
This is a chronic health problem for this patient for which she is on dialysis on Mondays, Wednesdays, and Fridays.  The problem for him is that he also has a wound that needs to go to the wound center.  Patient was not going to keep his appointments because of his dialysis schedule.  Were going to work between the 2 understanding how important it is for him to keep his dialysis appointments.  Patient ultimately agreed.

## 2018-11-13 ENCOUNTER — NON-PROVIDER VISIT (OUTPATIENT)
Dept: WOUND CARE | Facility: MEDICAL CENTER | Age: 78
End: 2018-11-13
Attending: PHYSICIAN ASSISTANT
Payer: MEDICARE

## 2018-11-13 DIAGNOSIS — R09.82 POST-NASAL DRIP: ICD-10-CM

## 2018-11-13 DIAGNOSIS — S91.002A OPEN WOUND OF LEFT ANKLE, INITIAL ENCOUNTER: ICD-10-CM

## 2018-11-13 PROCEDURE — 97602 WOUND(S) CARE NON-SELECTIVE: CPT

## 2018-11-13 NOTE — TELEPHONE ENCOUNTER
Have we ever prescribed this med? Yes.  If yes, what date? 9/1817     Last OV: 3/21/17 ZACH Soriano APRN     Next OV: No pending appt     DX: Post-nasal drip (R09.82)    Medications: fluticasone (FLONASE) 50 MCG/ACT nasal spray

## 2018-11-14 NOTE — PATIENT INSTRUCTIONS
Should you experience any significant changes in your wound(s) such as infection (redness, swelling, localized heat, increased pain, fever >101 F, chills) or have any questions regarding your home care instructions, please contact the wound center [phone]. If after hours, contact your primary care physician or go the hospital emergency room.  Keep dressing clean and dry and cover while bathing. Only change dressing if over saturated, soiled or its falling off.

## 2018-11-14 NOTE — PROGRESS NOTES
Patient has two new wounds today. The one to his left medial ankle he reports he bumped in his car (it is scabbed over). The new wound to his left lateral ankle was due to his scratching too hard and he broke open the skin. New order placed in [facility] to treat. Staff message sent to Dr. Cason about upcoming arterial procedure to restore blood flow to the left leg per patient request, and also encouraged patient to call Dr. Cason's office to inquire when the procedure will be.

## 2018-11-15 ENCOUNTER — NON-PROVIDER VISIT (OUTPATIENT)
Dept: WOUND CARE | Facility: MEDICAL CENTER | Age: 78
End: 2018-11-15
Attending: PHYSICIAN ASSISTANT
Payer: MEDICARE

## 2018-11-15 PROCEDURE — 99211 OFF/OP EST MAY X REQ PHY/QHP: CPT

## 2018-11-15 RX ORDER — FLUTICASONE PROPIONATE 50 MCG
SPRAY, SUSPENSION (ML) NASAL
Qty: 16 G | Refills: 4 | Status: SHIPPED | OUTPATIENT
Start: 2018-11-15 | End: 2018-12-11

## 2018-11-15 NOTE — NON-PROVIDER
Discussed pt with Trista MARRERO. Medihoney colloid is making malleolus wound wet and it is getting larger. Pt c/o severe burning. Orders for POC - NO DEBRIDEMENT OR COMPRESSION; DC medihoney; paint area with betadine, apply DSD, pt to repeat prn. Pt instr POC - he understands.

## 2018-11-15 NOTE — PATIENT INSTRUCTIONS
Should you experience any significant changes in your wound(s) such as infection (redness, swelling, localized heat, increased pain, fever >101 F, chills) or have any questions regarding your home care instructions, please contact the wound center [phone]. If after hours, contact your primary care physician or go the hospital emergency room.  Keep dressing clean and dry; paint areas with betadine and apply dry gauze dressings as needed.

## 2018-11-20 ENCOUNTER — APPOINTMENT (OUTPATIENT)
Dept: WOUND CARE | Facility: MEDICAL CENTER | Age: 78
End: 2018-11-20
Attending: PHYSICIAN ASSISTANT
Payer: MEDICARE

## 2018-11-27 ENCOUNTER — NON-PROVIDER VISIT (OUTPATIENT)
Dept: WOUND CARE | Facility: MEDICAL CENTER | Age: 78
End: 2018-11-27
Attending: PHYSICIAN ASSISTANT
Payer: MEDICARE

## 2018-11-27 DIAGNOSIS — S61.001A OPEN WOUND OF RIGHT THUMB, INITIAL ENCOUNTER: ICD-10-CM

## 2018-11-27 PROCEDURE — 97597 DBRDMT OPN WND 1ST 20 CM/<: CPT

## 2018-11-27 NOTE — PROCEDURES
CSWD using scissors and forceps to remove approx. ~8cm2 of peeling dead skin from deroofed blister on R thumb.

## 2018-11-28 NOTE — PATIENT INSTRUCTIONS
Keep dressing clean and dry and cover while bathing. Only change dressing if over saturated, soiled or its falling off.     Should you experience any significant changes in your wound(s) such as infection (redness, swelling, localized heat, increased pain, fever >101 F, chills) or have any questions regarding your home care instructions, please contact the wound center [phone]. If after hours, contact your primary care physician or go the hospital emergency room.

## 2018-11-28 NOTE — NON-PROVIDER
Pt presented with new wound on R thumb from deroofed blister. Pt also has blister on L thumb. Pt is unsure of etiology. Discussed rationale for treatment options, pt agreeable to POC.    Pt is having angiogram on 11/29/18 with Dr. Cason.

## 2018-11-29 ENCOUNTER — HOSPITAL ENCOUNTER (OUTPATIENT)
Facility: MEDICAL CENTER | Age: 78
End: 2018-11-29
Attending: SURGERY | Admitting: SURGERY
Payer: MEDICARE

## 2018-11-29 ENCOUNTER — APPOINTMENT (OUTPATIENT)
Dept: RADIOLOGY | Facility: MEDICAL CENTER | Age: 78
End: 2018-11-29
Attending: SURGERY
Payer: MEDICARE

## 2018-11-29 VITALS
OXYGEN SATURATION: 95 % | SYSTOLIC BLOOD PRESSURE: 158 MMHG | TEMPERATURE: 98.5 F | HEIGHT: 68 IN | BODY MASS INDEX: 24.32 KG/M2 | HEART RATE: 90 BPM | DIASTOLIC BLOOD PRESSURE: 67 MMHG | RESPIRATION RATE: 18 BRPM | WEIGHT: 160.5 LBS

## 2018-11-29 DIAGNOSIS — I70.213 ATHEROSCLEROSIS OF NATIVE ARTERIES OF EXTREMITIES WITH INTERMITTENT CLAUDICATION, BILATERAL LEGS (HCC): ICD-10-CM

## 2018-11-29 LAB
ANION GAP SERPL CALC-SCNC: 12 MMOL/L (ref 0–11.9)
BUN SERPL-MCNC: 27 MG/DL (ref 8–22)
CALCIUM SERPL-MCNC: 10 MG/DL (ref 8.5–10.5)
CHLORIDE SERPL-SCNC: 98 MMOL/L (ref 96–112)
CO2 SERPL-SCNC: 33 MMOL/L (ref 20–33)
CREAT SERPL-MCNC: 5.66 MG/DL (ref 0.5–1.4)
ERYTHROCYTE [DISTWIDTH] IN BLOOD BY AUTOMATED COUNT: 58.7 FL (ref 35.9–50)
GLUCOSE SERPL-MCNC: 92 MG/DL (ref 65–99)
HCT VFR BLD AUTO: 32.6 % (ref 42–52)
HGB BLD-MCNC: 10.4 G/DL (ref 14–18)
MCH RBC QN AUTO: 32.9 PG (ref 27–33)
MCHC RBC AUTO-ENTMCNC: 31.9 G/DL (ref 33.7–35.3)
MCV RBC AUTO: 103.2 FL (ref 81.4–97.8)
PLATELET # BLD AUTO: 203 K/UL (ref 164–446)
PMV BLD AUTO: 10.1 FL (ref 9–12.9)
POTASSIUM SERPL-SCNC: 4.2 MMOL/L (ref 3.6–5.5)
RBC # BLD AUTO: 3.16 M/UL (ref 4.7–6.1)
SODIUM SERPL-SCNC: 143 MMOL/L (ref 135–145)
WBC # BLD AUTO: 3.5 K/UL (ref 4.8–10.8)

## 2018-11-29 PROCEDURE — 700111 HCHG RX REV CODE 636 W/ 250 OVERRIDE (IP): Performed by: SURGERY

## 2018-11-29 PROCEDURE — 99153 MOD SED SAME PHYS/QHP EA: CPT

## 2018-11-29 PROCEDURE — 85027 COMPLETE CBC AUTOMATED: CPT

## 2018-11-29 PROCEDURE — 160002 HCHG RECOVERY MINUTES (STAT)

## 2018-11-29 PROCEDURE — 700117 HCHG RX CONTRAST REV CODE 255: Performed by: SURGERY

## 2018-11-29 PROCEDURE — 80048 BASIC METABOLIC PNL TOTAL CA: CPT

## 2018-11-29 PROCEDURE — 700111 HCHG RX REV CODE 636 W/ 250 OVERRIDE (IP)

## 2018-11-29 RX ORDER — HYDRALAZINE HYDROCHLORIDE 20 MG/ML
INJECTION INTRAMUSCULAR; INTRAVENOUS
Status: COMPLETED
Start: 2018-11-29 | End: 2018-11-29

## 2018-11-29 RX ORDER — MIDAZOLAM HYDROCHLORIDE 1 MG/ML
INJECTION INTRAMUSCULAR; INTRAVENOUS
Status: COMPLETED
Start: 2018-11-29 | End: 2018-11-29

## 2018-11-29 RX ORDER — IODIXANOL 270 MG/ML
55 INJECTION, SOLUTION INTRAVASCULAR ONCE
Status: COMPLETED | OUTPATIENT
Start: 2018-11-29 | End: 2018-11-29

## 2018-11-29 RX ORDER — HYDRALAZINE HYDROCHLORIDE 20 MG/ML
20 INJECTION INTRAMUSCULAR; INTRAVENOUS
Status: DISCONTINUED | OUTPATIENT
Start: 2018-11-29 | End: 2018-11-29 | Stop reason: HOSPADM

## 2018-11-29 RX ORDER — MIDAZOLAM HYDROCHLORIDE 1 MG/ML
.5-2 INJECTION INTRAMUSCULAR; INTRAVENOUS PRN
Status: DISCONTINUED | OUTPATIENT
Start: 2018-11-29 | End: 2018-11-29 | Stop reason: HOSPADM

## 2018-11-29 RX ORDER — HEPARIN SODIUM 1000 [USP'U]/ML
8000 INJECTION, SOLUTION INTRAVENOUS; SUBCUTANEOUS PRN
Status: DISCONTINUED | OUTPATIENT
Start: 2018-11-29 | End: 2018-11-29 | Stop reason: HOSPADM

## 2018-11-29 RX ORDER — SODIUM CHLORIDE 9 MG/ML
500 INJECTION, SOLUTION INTRAVENOUS
Status: DISCONTINUED | OUTPATIENT
Start: 2018-11-29 | End: 2018-11-29 | Stop reason: HOSPADM

## 2018-11-29 RX ORDER — HEPARIN SODIUM,PORCINE 1000/ML
VIAL (ML) INJECTION
Status: COMPLETED
Start: 2018-11-29 | End: 2018-11-29

## 2018-11-29 RX ADMIN — HYDRALAZINE HYDROCHLORIDE 20 MG: 20 INJECTION INTRAMUSCULAR; INTRAVENOUS at 12:16

## 2018-11-29 RX ADMIN — MIDAZOLAM 1 MG: 1 INJECTION INTRAMUSCULAR; INTRAVENOUS at 10:52

## 2018-11-29 RX ADMIN — HEPARIN SODIUM 8000 UNITS: 1000 INJECTION, SOLUTION INTRAVENOUS; SUBCUTANEOUS at 10:45

## 2018-11-29 RX ADMIN — FENTANYL CITRATE 25 MCG: 50 INJECTION, SOLUTION INTRAMUSCULAR; INTRAVENOUS at 10:15

## 2018-11-29 RX ADMIN — HYDRALAZINE HYDROCHLORIDE 20 MG: 20 INJECTION INTRAMUSCULAR; INTRAVENOUS at 16:06

## 2018-11-29 RX ADMIN — IODIXANOL 55 ML: 270 INJECTION, SOLUTION INTRAVASCULAR at 11:10

## 2018-11-29 RX ADMIN — MIDAZOLAM 1 MG: 1 INJECTION INTRAMUSCULAR; INTRAVENOUS at 10:15

## 2018-11-29 RX ADMIN — MIDAZOLAM HYDROCHLORIDE 1 MG: 1 INJECTION, SOLUTION INTRAMUSCULAR; INTRAVENOUS at 10:15

## 2018-11-29 RX ADMIN — FENTANYL CITRATE 25 MCG: 50 INJECTION, SOLUTION INTRAMUSCULAR; INTRAVENOUS at 10:00

## 2018-11-29 ASSESSMENT — PAIN SCALES - GENERAL
PAINLEVEL_OUTOF10: 0

## 2018-11-29 NOTE — DISCHARGE INSTRUCTIONS
ACTIVITY: Rest and take it easy for the first 24 hours.  A responsible adult is recommended to remain with you during that time.  It is normal to feel sleepy.  We encourage you to not do anything that requires balance, judgment or coordination.    MILD FLU-LIKE SYMPTOMS ARE NORMAL. YOU MAY EXPERIENCE GENERALIZED MUSCLE ACHES, THROAT IRRITATION, HEADACHE AND/OR SOME NAUSEA.    FOR 24 HOURS DO NOT:  Drive, operate machinery or run household appliances.  Drink beer or alcoholic beverages.   Make important decisions or sign legal documents.    SPECIAL INSTRUCTIONS:   ·   Minimal activity at home for 2 days.  ·   No Pushing, pulling, or heavy lifting (10 LBS) for 2 weeks.  ·   Keep dressings on for 2 days, then remove.  ·   Keep incisions dry and clean.  ·   Okay to shower after 2 days.  ·   No bath for 10 days    DIET: To avoid nausea, slowly advance diet as tolerated, avoiding spicy or greasy foods for the first day.  Add more substantial food to your diet according to your physician's instructions.  Babies can be fed formula or breast milk as soon as they are hungry.  INCREASE FLUIDS AND FIBER TO AVOID CONSTIPATION.    FOLLOW-UP APPOINTMENT:  A follow-up appointment should be arranged with your doctor Yoav in 1-2 weeks or 30 days, call [phone] to make an appointment.    You should CALL YOUR PHYSICIAN if you develop:  Fever greater than 101 degrees F.  Pain not relieved by medication, or persistent nausea or vomiting.  Excessive bleeding (blood soaking through dressing) or unexpected drainage from the wound.  Extreme redness or swelling around the incision site, drainage of pus or foul smelling drainage.  Inability to urinate or empty your bladder within 8 hours.  Problems with breathing or chest pain.    You should call 911 if you develop problems with breathing or chest pain.  If you are unable to contact your doctor or surgical center, you should go to the nearest emergency room or urgent care center.   Physician's telephone #: [phone]     If any questions arise, call your doctor.  If your doctor is not available, please feel free to call the Surgical Center at [phone].  The Center is open Monday through Friday from 7AM to 7PM.  You can also call the HEALTH HOTLINE open 24 hours/day, 7 days/week and speak to a nurse at [phone], or toll free at [phone].    A registered nurse may call you a few days after your surgery to see how you are doing after your procedure.    MEDICATIONS: Resume taking daily medication.  Take prescribed pain medication with food.  If no medication is prescribed, you may take non-aspirin pain medication if needed.  PAIN MEDICATION CAN BE VERY CONSTIPATING.  Take a stool softener or laxative such as senokot, pericolace, or milk of magnesia if needed.    If your physician has prescribed pain medication that includes Acetaminophen (Tylenol), do not take additional Acetaminophen (Tylenol) while taking the prescribed medication.    Depression / Suicide Risk    As you are discharged from this [facility] facility, it is important to learn how to keep safe from harming yourself.    Recognize the warning signs:  · Abrupt changes in personality, positive or negative- including increase in energy   · Giving away possessions  · Change in eating patterns- significant weight changes-  positive or negative  · Change in sleeping patterns- unable to sleep or sleeping all the time   · Unwillingness or inability to communicate  · Depression  · Unusual sadness, discouragement and loneliness  · Talk of wanting to die  · Neglect of personal appearance   · Rebelliousness- reckless behavior  · Withdrawal from people/activities they love  · Confusion- inability to concentrate     If you or a loved one observes any of these behaviors or has concerns about self-harm, here's what you can do:  · Talk about it- your feelings and reasons for harming yourself  · Remove any means that you might use to  "hurt yourself (examples: pills, rope, extension cords, firearm)  · Get professional help from the community (Mental Health, Substance Abuse, psychological counseling)  · Do not be alone:Call your Safe Contact- someone whom you trust who will be there for you.  · Call your local CRISIS HOTLINE 209-6700 or 195-153-9635  · Call your local Children's Mobile Crisis Response Team [place] (083) 098-4706 or www.Complexa  · Call the toll free National Suicide Prevention Hotlines   · National Suicide Prevention Lifeline 892-272-TXTS (3069)  · [employer] Network 800-SUICIDE (702-3542)    Post Angiogram Groin Care Instructions     INSTRUCTIONS  2. Examine (look and feel) the site of your incision site TODAY so you can recognize changes that should be called to your doctor (see below).  3. For at least 72 hours, if you should sneeze or cough, please hold pressure over your groin area.  4. If you should begin to have oozing from the catheterization site, please hold firm pressure and call your doctor's office immediately.  5. If profuse bleeding occurs from the catheterization site, hold firm pressure and call \"[phone]\" immediately for assistance.    ACTIVITY  2. Limit activity as instructed by your doctor.  3. No driving or very limited driving with frequent stops for one week.   4. If you must take a long car ride, stop every hour and walk around the car.   5. Warm showers or baths are permitted after the bandage is removed. Avoid hot showers, baths, hot tubs, and swimming for 10 days.    PLEASE CALL YOUR DOCTOR IF:  1. Temperature elevation occurs.  2. Catheterization site becomes reddened or begins to drain.   3. Bruising appears to be new or not resolving. The bruise may move down your leg. This is normal.  4. The small round lump in the groin increases in size.  5. Any leg numbness, aching, or discomfort (immediately).  6. Increasing discomfort in the leg at the insertion site.  7. Chest pains, even if " relieved by Nitroglycerin.    MISCELLANEOUS INSTRUCTIONS  1. Bruising may occur as a result of heart catheterization. Some of the discoloration may travel down the leg, going from blue to green in color.  2. A small round lump under the catheterization site will remain for up to six weeks.  3. If any questions arise call your physician's office. You can also call the HEALTH HOTLINE open 24 hours/day, 7 days/week and speak to a nurse at [phone], or toll free at [phone].   4. You should call 911 if you develop problems with breathing or chest pain.    FOR PROBLEMS CALL AKIL Cason AT: [phone]    I acknowledge receipt and understanding of these Home Care instructions.

## 2018-11-29 NOTE — OP REPORT
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    OPERATIONS:  1.  Right femoral sheath placement under ultrasound and fluoroscopic guidance.  2.  Distal abdominal aortoiliac arteriograms and demonstrating the mid distal   aorta into the groins and the bifurcations of the common femoral arteries.  3.  Passage of catheters and wires from the right groin over the bifurcation   down to the level of the distal superficial femoral or proximal popliteal   artery.  4.  Unable to cross distal occlusive lesion in the left distal superficial   femoral artery despite multiple efforts and equipments.  Balloon angioplasties   superficial femoral artery to create a lumen around a severely stenotic   focus.  5.  Left lower extremity runoff angiograms showing patent popliteal artery   starting a short distance above the knee joint and basically 3-vessel runoff   into the upper calf.    SURGEON:  David Cason MD    ANESTHESIA:  Moderate conscious sedation.    CONSCIOUS SEDATION NURSE:  ROCHELLE Pollard    CLOSURE DEVICE USED:  StarClose.    DESCRIPTION OF PROCEDURE:  After obtaining informed consent, the patient was   positioned on the angiography table.  His groins were prepped and draped   sterilely in a routine fashion.  A timeout was performed.  Local anesthetic   was injected in the right groin as I did ultrasound identifying moderate   amount of thick posterior wall plaque and an area anteriorly that looked a   little thinner without heavy plaque.  I was able to place a 4-Maltese sheath   under ultrasound guidance.  An Amplatz wire was advanced without resistance   into the abdominal aorta.  We used an Omniflush catheter to get over the   bifurcation, but could not pass it beyond the proximal external iliac due to   tortuosity and other less  well-defined factors.  We used the glide cath and   pulled out the Omniflush catheter, but the glide cath was indeed a more   successful.  We switched to a Command wire and we were able to pass this down   to the adductor canal.  An ____ with a 0.018 dilator inserted was passed over   the bifurcation and could only be pushed as far as the proximal internal   iliac.  We worked from this location and used several different types of wires   including a Camp wire, 2 types of Command wire, and a magic torque wire   without success.  We reached a point where there appeared to be a focal   obstruction where the wire was located and a 3 mm balloon was inflated and   this allowed us to extend the passage of the Command wire down to the proximal   popliteal.  We could not get through this popliteal artery.  The plaque was   too dense.  We tried multiple techniques and as I have stated, multiple wires.    Contrast injected showed that the distal above-knee popliteal was patent and   the below-knee popliteal was patent into the trifurcation and bypass might be   an option.  We did obliques of the groin and I chose to use a StarClose,   which worked satisfactory.  We had given 8000 units of heparin at the early   part of the case and did not reverse it.    CONCLUSION:  1.  Patent aorta and common internal and external iliac arteries bilaterally   without significant stenosis.  2.  Bilateral calcific plaque in the common femoral arteries without definite   stenoses seen.  3.  The right lower extremity has a few recurrent lesions in the adductor   canal and popliteal area from prior atherectomy in June of 2018.  We did not   intend to go back and treat this as his leg symptoms are minimal now in that   extremity.  On the left side, the superficial femoral artery is severely   diseased for its full length and the popliteal was normal below the knee and   at the knee joint and could serve as a bypass target.  We had planned an    atherectomy, but this was not to be.  I used the StarClose successfully and we   dressed the wound and take the patient to recovery.       ____________________________________     MD VILMA Fowler / DELANO    DD:  11/29/2018 11:56:41  DT:  11/29/2018 12:59:55    D#:  [id number]  Job#:  [id number]

## 2018-11-29 NOTE — PROGRESS NOTES
IR Procedure Note:    Patient consented in [facility] surgery by Dr Cason prior to procedure; all questions answered.  Site marked and confirmed with MD, patient and RN pre procedure.  Dr. Cason completed angiogram with left lower extremity angioplasty; right femoral artery accessed.  The patient tolerated the procedure well; ETCo2 range 33-37, with consistent waveform during the procedure.  Starclose closure device in place to right femoral artery.  Gauze and tegaderm applied to right groin access site, CDI and soft; pressure held x 10 minutes.  Patient alert and oriented and verbally appropriate post procedure, vital signs stable, see flow sheet.  Report given to ROCHELLE Michael.  ROCHELLE Kenny transported patient to [facility] surgery recovery and patient handoff to RN.        Guerra Starclose SE  6F  REF [id number]  LOT [id number]

## 2018-12-04 ENCOUNTER — NON-PROVIDER VISIT (OUTPATIENT)
Dept: WOUND CARE | Facility: MEDICAL CENTER | Age: 78
End: 2018-12-04
Attending: SURGERY
Payer: MEDICARE

## 2018-12-04 PROCEDURE — 97597 DBRDMT OPN WND 1ST 20 CM/<: CPT

## 2018-12-04 PROCEDURE — 99201 HCHG LEVEL I NEW PATIENT: CPT

## 2018-12-04 NOTE — PATIENT INSTRUCTIONS
For R thumb wound, apply silver hydrofiber to wound bed as directed by provider. Cover wound with foam and change dressing every 3~4 or if it becomes over saturated, soiled or falls off.    For left leg scabs, paint them with betadine, let it dry, then cover with dry gauze and hypafix tape.    Keep dressing clean and dry and cover while bathing. Only change dressing if over saturated, soiled or its falling off.     Should you experience any significant changes in your wound(s) such as infection (redness, swelling, localized heat, increased pain, fever >101 F, chills) or have any questions regarding your home care instructions, please contact the wound center [phone]. If after hours, contact your primary care physician or go the hospital emergency room.

## 2018-12-04 NOTE — CERTIFICATION
"Advanced Wound Care  [facility]  [address]  [address]  [place] [address]  [phone] Fax: [phone]      Initial Evaluation  For Certification Period: 12/4/2018 - 02/24/2019  Start of Care: 12/4/2018          Referring Physician: David Cason M.D.  Primary Physician:     Martha Light M.D.  Consulting Physicians:       Wound(s): Left ankle and leg scabs, Right thumb       Subjective:        HPI: Pt presents with wound to LLE/anterior ankle wound. Pt states wound began \"several monts ago\" from a pair off ill fitting shoes. Pt has been dressing with Neosporin and bandage. Pt has a PMH of PAD and smoking x40 years. Does not currently smoke. Reports claudication pains. Pt expecting a procedure with Dr. Cason whom has done vascular surgeries on patient in the past. There is no scheduled date for this procedure as of 11/01/2018.      12/4/18 Pt came back as new eval after angiogram with Dr. Cason on 11/29/18. However pt states they were not able to do the procedure they planned on LLE and needs fem-pop bypass, possibly in January 2019. R thumb wound from a blister is almost healed.    Pain:  8-9/10    Past Medical History:       Past Medical History:   Diagnosis Date   • Anesthesia     \"needs more sedation\" (can sometimes hear MD during procedure)    • Arterial leg ulcer (HCC) 11/8/2018   • Basal cell carcinoma of left cheek 3/26/2015   • BPH 7/14/2009   • Bronchitis 1/1/13   • CAD (coronary artery disease) 2/20/2014   • CATARACT     sanjuanita surgery complete   • Chronic respiratory failure with hypoxia (HCC) 5/8/2016   • CKD (chronic kidney disease) stage 4, GFR 15-29 ml/min (HCC) 1/15/2010    end stage renal disease   • COPD (chronic obstructive pulmonary disease) (HCC)    • COPD (chronic obstructive pulmonary disease) ([facility]) 8/2/2011   • Detached retina    • Dialysis     m,w,f juliann   • EMPHYSEMA    • Gout    • Heart burn     occas   • High cholesterol    • HTN (hypertension) 1/15/2010 "   • Indigestion     occas   • Kidney cyst    • Leg pain, bilateral 8/10/2015   • On supplemental oxygen therapy    • Peripheral vascular disease (HCC) 8/10/2015   • Pneumonia    • Primary insomnia 3/22/2018   • Proteinuria    • Sleep apnea     O2 PER CANULA  AT NIGHT 2l/m   • Snoring           Current Medications:   Current Outpatient Prescriptions:   •  fluticasone (FLONASE) 50 MCG/ACT nasal spray, SPRAY ONE TO TWO SPRAYS IN EACH NOSTRIL ONCE DAILY (FLONASE), Disp: 16 g, Rfl: 4  •  Calcium Acetate, Phos Binder, 667 MG Cap, TAKE 3 CAPSULES BY MOUTH WITH MEALS  AND 2 CAPSULES BY MOUTH WITH SNACKS (2 SNACKS), Disp: 390 Cap, Rfl: 11  •  doxazosin (CARDURA) 4 MG Tab, TAKE ONE TABLET BY MOUTH DAILY, Disp: 90 Tab, Rfl: 3  •  labetalol (NORMODYNE) 300 MG Tab, TAKE TWO TABLETS BY MOUTH TWICE A DAY (NORMODYNE), Disp: 360 Tab, Rfl: 3  •  omeprazole (PRILOSEC) 40 MG delayed-release capsule, Take 1 Cap by mouth every day., Disp: 30 Cap, Rfl: 1  •  Cholecalciferol (VITAMIN D PO), Take  by mouth., Disp: , Rfl:   •  budesonide-formoterol (SYMBICORT) 160-4.5 MCG/ACT Aerosol, Inhale 2 Puffs by mouth 2 Times a Day for 90 days., Disp: 2 Inhaler, Rfl: 11  •  zolpidem (AMBIEN) 10 MG Tab, Take 1 Tab by mouth every bedtime for 90 days., Disp: 30 Tab, Rfl: 2  •  pravastatin (PRAVACHOL) 20 MG Tab, TAKE ONE TABLET BY MOUTH DAILY, Disp: 90 Tab, Rfl: 3  •  ROPINIRole (REQUIP) 0.5 MG Tab, TAKE TWO TABLETS BY MOUTH DAILY, Disp: 180 Tab, Rfl: 3  •  clopidogrel (PLAVIX) 75 MG Tab, TAKE ONE TABLET BY MOUTH DAILY, Disp: 90 Tab, Rfl: 3  •  fluticasone (FLONASE) 50 MCG/ACT nasal spray, fluticasone 50 mcg/actuation nasal spray,suspension, Disp: , Rfl:   •  lisinopril (PRINIVIL, ZESTRIL) 40 MG tablet, lisinopril 40 mg tablet, Disp: , Rfl:   •  traZODone (DESYREL) 150 MG Tab, TAKE TWO TABLETS BY MOUTH EVERY NIGHT AT BEDTIME *DESYREL*, Disp: 180 Tab, Rfl: 3  •  furosemide (LASIX) 40 MG Tab, Take 40 mg by mouth 2 Times a Day., Disp: , Rfl:       Allergies:   No Known Allergies       Past Surgical History:    Past Surgical History:   Procedure Laterality Date   • ENDOSCOPY PROCEDURE  3/21/2018    Procedure: ENDOSCOPY PROCEDURE/UPPER;  Surgeon: Enrique Child D.O.;  Location: [facility];  Service: Gastroenterology   • COLONOSCOPY - ENDO  8/15/2016    Procedure: COLONOSCOPY - ENDO;  Surgeon: Mane Whatley M.D.;  Location: ENDOSCOPY [facility];  Service:    • GASTROSCOPY WITH BIOPSY  8/13/2016    Procedure: GASTROSCOPY WITH BIOPSY;  Surgeon: Jorge Leavitt M.D.;  Location: ENDOSCOPY [facility];  Service:    • RECOVERY  12/23/2015    Procedure: VASCULAR CASE-CASON-LEFT ARM FISTULOGRAM WITH ANGIOPLASTY;  Surgeon: Recoveryonly Surgery;  Location: SURGERY PRE-POST PROC UNIT [facility];  Service:    • RECOVERY  3/24/2015    Performed by Recoveryonly Surgery at SURGERY PRE-POST PROC UNIT [facility]   • RECOVERY  7/29/2014    Performed by Ir-Recovery Surgery at SURGERY SAME DAY [facility]   • RECOVERY  3/24/2014    Performed by Ir-Recovery Surgery at SURGERY [facility]   • RECOVERY  12/17/2013    Performed by Ir-Recovery Surgery at SURGERY [facility] DAY [facility]   • RECOVERY  7/2/2013    Performed by Ir-Recovery Surgery at SURGERY SAME DAY [facility]   • AV FISTULA THROMBOLYSIS  7/2/2013    Performed by David Cason M.D. at SURGERY [facility]   • RECOVERY  1/29/2013    Performed by Ir-Recovery Surgery at SURGERY SAME DAY [facility]   • RECOVERY  7/23/2012    Performed by SURGERY, IR-RECOVERY at SURGERY SAME DAY [facility]   • VITRECTOMY POSTERIOR  10/11/2011    Performed by NAHUM GE at SURGERY SAME DAY [facility]   • RECOVERY  8/12/2011    Performed by SURGERY, IR-RECOVERY at SURGERY SAME DAY [facility]   • VITRECTOMY POSTERIOR  1/18/2011    Performed by NAHUM GE at SURGERY SAME DAY [facility]   • SCLERAL BUCKLING  1/18/2011    Performed by NAHUM GE at SURGERY SAME DAY [facility]   • AV FISTULOGRAM  9/17/2010     Performed by ALESHIA MOSCOSO at SURGERY [facility]   • ANGIOPLASTY BALLOON  9/17/2010    Performed by ALESHIA MOSCOSO at SURGERY [facility]   • AV FISTULOGRAM  7/23/2010    Performed by ALESHIA MOSCOSO at SURGERY [facility]   • ANGIOPLASTY BALLOON  7/23/2010    Performed by ALESHIA MOSCOSO at SURGERY [facility]   • AV FISTULA REVISION  2/19/2010    Performed by WILLIE HATCH at SURGERY [facility]   • AV FISTULA CREATION  2/12/2010    Performed by ALESHIA MOSCOSO at SURGERY [facility]   • CATARACT PHACO WITH IOL  4/8/08    Performed by STACEY PHILLIPS at SURGERY SAME DAY [facility]          Social History:     Social History     Social History   • Marital status:      Spouse name: N/A   • Number of children: N/A   • Years of education: N/A     Occupational History   • Not on file.     Social History Main Topics   • Smoking status: Former Smoker     Packs/day: 1.00     Years: 40.00     Types: Cigarettes     Quit date: 1/1/2009   • Smokeless tobacco: Never Used      Comment: 1 pk a day for 35 yrs, QUIT JAN 1 2010   • Alcohol use No   • Drug use: No   • Sexual activity: No      Comment: , 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     Other Topics Concern   • Not on file     Social History Narrative   • No narrative on file            Objective:      Tests and Measures: 12/4/2018 L DP and PT not palpable, monophasic heard on doppler  11/01/2018     Left   PT 64   Brachial 162 (taken on R arm, pt has AV fistula on L)   XAVI 0.4       Orthotic, protective, supportive devices: none    Fall Risk Assessment (quan all that apply with an X):  Completed at initial eval on 12/04/2018   X 65 years or older     Fall within the last 2 years, uses   Ambulatory devices  Loss of protective sensation in feet,   Use of prostethic/orthotic, years    Presence of lower extremity/foot/toe amputation   X Taking medication that increases risk (per facility policy)    Interventions  Recommended (if any of the above are selected):   Use of Assistive Device:________________________   Supervision with ambulation:  Independent   Assistance with ambulation:  Independent   X Home safety education:  Educational material provided                 Patient Education: Discussed plan of care and rationale for dressing selection. Discussed implications of poor arterial status and need to wear footwear that completely covers feet so as to protect from accidentally causing new wounds. Instructed patient to inform [facility] when he schedules bypass with Dr. Cason. Instructed pt on s/s infection - chills, fever, malaise, [place], increased redness/swelling/pain/exudate - and to go to ER/Urgent Care; to paint left leg scabs with betadine, let it dry, then cover with dry gauze and hypafix; change right thumb dressing with silver hydrofiber and foam; keep dressing clean, dry, intact and to return to [facility] 1x/2 weeks for wound care. Pt agreeable to plan of care and verbalizes understanding of all instruction.     Professional Collaboration: Initial evaluation sent to referring provider via Epic.      Assessment:      Wound etiology: Arterial, trauma    Wound Progress:  Initial Evaluation    Rationale for Treatment: Betadine to help scab formation on LLE. AqAg on R thumb to manage bioburden, absorb exudate, and maintain moist wound environment without laterally wicking exudate therefore reducing ena-wound maceration. Foam to pad/protect.    Patient tolerance/compliance: Pt tolerated treatment well and listened intently to all instruction.     Complicating factors: Poor arterial status    Need for ongoing Advanced Wound Care services:Patient requires skilled therapeutic wound care services for product selection, application of product, debridement, close monitoring with clinical assessment for expedite of wound healing.     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

## 2018-12-04 NOTE — PROCEDURES
CSWD using forceps and scissors to remove approx. ~0.5cm2 of peeling dead skin from periwound of R thumb.

## 2018-12-06 ENCOUNTER — TELEPHONE (OUTPATIENT)
Dept: CARDIOLOGY | Facility: MEDICAL CENTER | Age: 78
End: 2018-12-06

## 2018-12-06 NOTE — TELEPHONE ENCOUNTER
Patient wants call back about bypass surgery   Received: Today   Message Contents   EDU Alvarez/Orin     Patient needs to have bypass surgery on his left leg and wants to discuss with Dr Mendoza. He also wants to get a recommendation for a vascular surgeon. He has already seen Dr Cason and Dr Peters, but he wants to get a 2nd opinion with a different vascular surgeon. He can be reached at [phone] or [phone].      Returned patient call. Pt informed that FK is out of office for approx one week. Pt informs me that surgery wont be that soon anyway. Pt reassured that I would discuss with FK upon his return and would call patient with any suggestions or advisement. Pt appreciative and wishes me a happy holiday.

## 2018-12-10 ENCOUNTER — HOSPITAL ENCOUNTER (OUTPATIENT)
Facility: MEDICAL CENTER | Age: 78
DRG: 252 | End: 2018-12-10
Attending: SURGERY | Admitting: SURGERY
Payer: MEDICARE

## 2018-12-11 ENCOUNTER — OFFICE VISIT (OUTPATIENT)
Dept: MEDICAL GROUP | Facility: MEDICAL CENTER | Age: 78
End: 2018-12-11
Payer: MEDICARE

## 2018-12-11 VITALS
DIASTOLIC BLOOD PRESSURE: 76 MMHG | HEIGHT: 68 IN | HEART RATE: 79 BPM | BODY MASS INDEX: 24.66 KG/M2 | WEIGHT: 162.7 LBS | TEMPERATURE: 98.2 F | OXYGEN SATURATION: 98 % | RESPIRATION RATE: 14 BRPM | SYSTOLIC BLOOD PRESSURE: 120 MMHG

## 2018-12-11 DIAGNOSIS — F51.01 PRIMARY INSOMNIA: ICD-10-CM

## 2018-12-11 DIAGNOSIS — I73.9 PERIPHERAL VASCULAR DISEASE (HCC): ICD-10-CM

## 2018-12-11 PROCEDURE — 99214 OFFICE O/P EST MOD 30 MIN: CPT | Performed by: FAMILY MEDICINE

## 2018-12-11 RX ORDER — ZOLPIDEM TARTRATE 10 MG/1
10 TABLET ORAL
Qty: 90 TAB | Refills: 0 | Status: SHIPPED | OUTPATIENT
Start: 2018-12-11 | End: 2019-03-07 | Stop reason: SDUPTHER

## 2018-12-11 NOTE — ASSESSMENT & PLAN NOTE
This is a chronic health problem for this patient for which he utilizes Ambien to get adequate sleep.  This patient is dealing with multiple chronic health problems including peripheral vascular disease as well as chronic kidney damage.  He tried to have a vascular study of his left leg again and unfortunately they were unable to get the catheter placed.  He is going to have to have a bypass to that leg sometime mid January.  Between now and then he is living with fairly excruciating discomfort from the nerve damage and from the muscle not being able to get adequate blood supply.  We will continue to provide the Ambien.  Prescription provided today.  Obtained and reviewed patient utilization report from [facility] pharmacy database on 12/11/2018 1:09 PM  prior to writing prescription for controlled substance II, III or IV per [place] bill . Based on assessment of the report,my physical exam if necessary and the patient's health problem, the prescription is medically necessary.

## 2018-12-11 NOTE — PROGRESS NOTES
CC:Diagnoses of Primary insomnia and Peripheral vascular disease (HCC) were pertinent to this visit.    HISTORY OF PRESENT ILLNESS: Patient is a 78 y.o. male established patient who presents today to discuss on her disease and to get a refill of his Ambien which she is utilizing for primary insomnia.    Health Maintenance: Completed    Primary insomnia  This is a chronic health problem for this patient for which he utilizes Ambien to get adequate sleep.  This patient is dealing with multiple chronic health problems including peripheral vascular disease as well as chronic kidney damage.  He tried to have a vascular study of his left leg again and unfortunately they were unable to get the catheter placed.  He is going to have to have a bypass to that leg sometime mid January.  Between now and then he is living with fairly excruciating discomfort from the nerve damage and from the muscle not being able to get adequate blood supply.  We will continue to provide the Ambien.  Prescription provided today.  Obtained and reviewed patient utilization report from [facility] [place] [facility] pharmacy database on 12/11/2018 1:09 PM  prior to writing prescription for controlled substance II, III or IV per [place] bill . Based on assessment of the report,my physical exam if necessary and the patient's health problem, the prescription is medically necessary.       Peripheral vascular disease (HCC)  This continues to be a serious problem for this patient where he has poor vascular supply to the left lower extremity and has 3 wounds at the ankle 1 of which is not healing well secondary to the poor blood supply.  He just did see Dr. Cason who tried to do a vascular study but unfortunately they could not get the catheter in.  Patient is set up to have a bypass in mid January.  I talked with him about the fact that I really have nothing to help with his pain and the poor healing that is going on.  Discussed the various things he has tried in the  past.      Patient Active Problem List    Diagnosis Date Noted   • Elevated coronary artery calcium score 02/20/2014     Priority: High   • Pericardial effusion without cardiac tamponade 04/20/2017     Priority: Medium   • Aortic stenosis, moderate 04/20/2017     Priority: Medium   • Dyslipidemia 12/05/2014     Priority: Low   • BPH (benign prostatic hypertrophy) 07/14/2009     Priority: Low   • Arterial leg ulcer ([facility]) 11/08/2018   • Acute gastric ulcer 10/19/2018   • Primary insomnia 03/22/2018   • Gastric polyps 03/21/2018   • Esophagitis 03/21/2018   • Uremia 03/20/2018   • Nocturnal hypoxemia 03/21/2017   • Pulmonary hypertension ([facility]) 03/21/2017   • Pedal edema 03/21/2017   • Dyspnea on exertion 03/16/2017   • Need for prophylactic vaccination with combined vaccine 09/08/2016   • AVM (arteriovenous malformation) 09/08/2016   • Systolic CHF, acute ([facility]) 05/11/2016   • Essential hypertension 05/11/2016   • Hyperkalemia 05/11/2016   • Renal cyst 05/11/2016   • Chronic respiratory failure with hypoxia ([facility]) 05/08/2016   • Hyperkalemia, diminished renal excretion 05/08/2016   • Pulmonary edema 05/08/2016   • Leukocytosis 05/08/2016   • ESRD (end stage renal disease) on dialysis ([facility]) 05/08/2016   • Uric acid arthropathy 01/07/2016   • Primary osteoarthritis of both hands 08/20/2015   • Peripheral vascular disease ([facility]) 08/10/2015   • Anemia in CKD (chronic kidney disease) 05/29/2015   • Basal cell carcinoma of left cheek 03/26/2015   • End stage renal disease ([facility]) 03/24/2014   • 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[facility]) 07/02/2013   • Kidney damage 01/29/2013   • Secondary renal hyperparathyroidism ([facility]) 12/14/2012   • COPD (chronic obstructive pulmonary disease) ([facility]) 08/02/2011   • HELGA (obstructive sleep apnea) 05/13/2011   • Hard of hearing 04/22/2011   • Preventative health care 04/22/2011      Allergies:Patient has no known allergies.    Current Outpatient Prescriptions   Medication Sig Dispense Refill   • zolpidem (AMBIEN)  10 MG Tab Take 1 Tab by mouth every bedtime for 90 days. 90 Tab 0   • Calcium Acetate, Phos Binder, 667 MG Cap TAKE 3 CAPSULES BY MOUTH WITH MEALS  AND 2 CAPSULES BY MOUTH WITH SNACKS (2 SNACKS) 390 Cap 11   • doxazosin (CARDURA) 4 MG Tab TAKE ONE TABLET BY MOUTH DAILY 90 Tab 3   • labetalol (NORMODYNE) 300 MG Tab TAKE TWO TABLETS BY MOUTH TWICE A DAY (NORMODYNE) 360 Tab 3   • omeprazole (PRILOSEC) 40 MG delayed-release capsule Take 1 Cap by mouth every day. 30 Cap 1   • Cholecalciferol (VITAMIN D PO) Take  by mouth.     • pravastatin (PRAVACHOL) 20 MG Tab TAKE ONE TABLET BY MOUTH DAILY 90 Tab 3   • ROPINIRole (REQUIP) 0.5 MG Tab TAKE TWO TABLETS BY MOUTH DAILY 180 Tab 3   • clopidogrel (PLAVIX) 75 MG Tab TAKE ONE TABLET BY MOUTH DAILY 90 Tab 3   • fluticasone (FLONASE) 50 MCG/ACT nasal spray fluticasone 50 mcg/actuation nasal spray,suspension     • lisinopril (PRINIVIL, ZESTRIL) 40 MG tablet lisinopril 40 mg tablet     • traZODone (DESYREL) 150 MG Tab TAKE TWO TABLETS BY MOUTH EVERY NIGHT AT BEDTIME *DESYREL* 180 Tab 3   • furosemide (LASIX) 40 MG Tab Take 40 mg by mouth 2 Times a Day.       No current facility-administered medications for this visit.        Social History   Substance Use Topics   • Smoking status: Former Smoker     Packs/day: 1.00     Years: 40.00     Types: Cigarettes     Quit date: 1/1/2009   • Smokeless tobacco: Never Used      Comment: 1 pk a day for 35 yrs, QUIT JAN 1 2010   • Alcohol use No     Social History     Social History Narrative   • No narrative on file       Family History   Problem Relation Age of Onset   • Stroke Mother    • Hypertension Mother    • Lung Disease Father         Emphysema, resp failure   • Genitourinary () Maternal Aunt         hematuria   • Hypertension Brother         ROS:     - Constitutional:  Negative for fever, chills, unexpected weight change, and fatigue/generalized weakness.    - HEENT:  Negative for headaches, vision changes, hearing changes, ear pain,  "ear discharge, rhinorrhea, sinus congestion, sore throat, and neck pain.      - Respiratory: Negative for cough, sputum production, chest congestion, dyspnea, wheezing, and crackles.      - Cardiovascular: Negative for chest pain, palpitations, orthopnea, and bilateral lower extremity edema.         Exam:    Blood pressure 120/76, pulse 79, temperature 36.8 °C (98.2 °F), temperature source Temporal, resp. rate 14, height 1.727 m (5' 8\"), weight 73.8 kg (162 lb 11.2 oz), SpO2 98 %. Body mass index is 24.74 kg/m².    General:  Well nourished, well developed male in NAD  Head is grossly normal.    Patient was seen for 25 minutes face to face of which, 20 minutes was spent counseling regarding medications interactions and side effects.  Also a discussion of the various medications he has tried to help with his pain and to help aid the healing of his left lower extremity.  I think he finally now understands what peripheral vascular disease is costing him in the long run..    Please note that this dictation was created using voice recognition software. I have made every reasonable attempt to correct obvious errors, but I expect that there are errors of grammar and possibly content that I did not discover before finalizing the note.    Assessment/Plan:  1. Primary insomnia  Controlled as long as he has Ambien.  I wrote him for a 90-day supply.  We will see him back before he runs out of that supply.  - zolpidem (AMBIEN) 10 MG Tab; Take 1 Tab by mouth every bedtime for 90 days.  Dispense: 90 Tab; Refill: 0    2. Peripheral vascular disease (HCC)  Uncontrolled, unfortunately this gentleman did not stop smoking soon enough and is paying the price with extremely poor peripheral vasculature.  If his bypass is not successful he will lose his left lower extremity.         "

## 2018-12-11 NOTE — ASSESSMENT & PLAN NOTE
This continues to be a serious problem for this patient where he has poor vascular supply to the left lower extremity and has 3 wounds at the ankle 1 of which is not healing well secondary to the poor blood supply.  He just did see Dr. Cason who tried to do a vascular study but unfortunately they could not get the catheter in.  Patient is set up to have a bypass in mid January.  I talked with him about the fact that I really have nothing to help with his pain and the poor healing that is going on.  Discussed the various things he has tried in the past.

## 2018-12-12 ENCOUNTER — TELEPHONE (OUTPATIENT)
Dept: CARDIOLOGY | Facility: MEDICAL CENTER | Age: 78
End: 2018-12-12

## 2018-12-12 NOTE — TELEPHONE ENCOUNTER
Discussed with MD.    Pt called.    Dr. Antolin De Luna advised.    [facility]    [address] [facility], [address]  [place] [address]  Phone: [phone]    Pt states understanding. Pt will call previous group and get records.

## 2018-12-13 RX ORDER — OMEPRAZOLE 40 MG/1
CAPSULE, DELAYED RELEASE ORAL
Qty: 90 CAP | Refills: 3 | Status: SHIPPED | OUTPATIENT
Start: 2018-12-13 | End: 2019-06-12

## 2018-12-18 ENCOUNTER — NON-PROVIDER VISIT (OUTPATIENT)
Dept: WOUND CARE | Facility: MEDICAL CENTER | Age: 78
End: 2018-12-18
Attending: SURGERY
Payer: MEDICARE

## 2018-12-18 PROCEDURE — 97597 DBRDMT OPN WND 1ST 20 CM/<: CPT

## 2018-12-19 NOTE — PATIENT INSTRUCTIONS
Keep dressing clean and dry and cover while bathing. Only change dressing if over saturated, soiled or its falling off.     Should you experience any significant changes in your wound(s) such as infection (redness, swelling, localized heat, increased pain, fever >101 F, chills) or have any questions regarding your home care instructions, please contact the wound center [phone]. If after hours, contact your primary care physician or go the hospital emergency room.

## 2018-12-21 DIAGNOSIS — I10 ESSENTIAL HYPERTENSION: ICD-10-CM

## 2018-12-24 RX ORDER — LISINOPRIL 40 MG/1
TABLET ORAL
Qty: 180 TAB | Refills: 3 | Status: SHIPPED | OUTPATIENT
Start: 2018-12-24 | End: 2019-06-12

## 2018-12-27 ENCOUNTER — OFFICE VISIT (OUTPATIENT)
Dept: URGENT CARE | Facility: CLINIC | Age: 78
End: 2018-12-27
Payer: MEDICARE

## 2018-12-27 ENCOUNTER — APPOINTMENT (OUTPATIENT)
Dept: RADIOLOGY | Facility: IMAGING CENTER | Age: 78
End: 2018-12-27
Attending: PHYSICIAN ASSISTANT
Payer: MEDICARE

## 2018-12-27 VITALS
BODY MASS INDEX: 24.55 KG/M2 | RESPIRATION RATE: 18 BRPM | OXYGEN SATURATION: 95 % | WEIGHT: 162 LBS | DIASTOLIC BLOOD PRESSURE: 78 MMHG | TEMPERATURE: 98.3 F | SYSTOLIC BLOOD PRESSURE: 120 MMHG | HEIGHT: 68 IN | HEART RATE: 84 BPM

## 2018-12-27 DIAGNOSIS — J40 BRONCHITIS: ICD-10-CM

## 2018-12-27 DIAGNOSIS — J18.9 PNEUMONIA DUE TO INFECTIOUS ORGANISM, UNSPECIFIED LATERALITY, UNSPECIFIED PART OF LUNG: ICD-10-CM

## 2018-12-27 DIAGNOSIS — R05.9 COUGH: ICD-10-CM

## 2018-12-27 PROCEDURE — 71046 X-RAY EXAM CHEST 2 VIEWS: CPT | Mod: 26 | Performed by: PHYSICIAN ASSISTANT

## 2018-12-27 PROCEDURE — 99214 OFFICE O/P EST MOD 30 MIN: CPT | Performed by: PHYSICIAN ASSISTANT

## 2018-12-27 RX ORDER — ALBUTEROL SULFATE 90 UG/1
1-2 AEROSOL, METERED RESPIRATORY (INHALATION) EVERY 6 HOURS PRN
Qty: 8.5 G | Refills: 0 | Status: ON HOLD | OUTPATIENT
Start: 2018-12-27 | End: 2019-01-25

## 2018-12-27 RX ORDER — DOXYCYCLINE HYCLATE 100 MG
100 TABLET ORAL 2 TIMES DAILY
Qty: 14 TAB | Refills: 0 | Status: SHIPPED | OUTPATIENT
Start: 2018-12-27 | End: 2019-01-03

## 2018-12-27 ASSESSMENT — ENCOUNTER SYMPTOMS
NAUSEA: 0
SHORTNESS OF BREATH: 1
DIARRHEA: 0
ABDOMINAL PAIN: 0
SPUTUM PRODUCTION: 1
HEADACHES: 0
MYALGIAS: 0
RHINORRHEA: 0
COUGH: 1
WHEEZING: 1
FEVER: 0
HEMOPTYSIS: 0
PALPITATIONS: 0
SINUS PAIN: 0
SORE THROAT: 1
SWEATS: 0
VOMITING: 0
CHILLS: 1

## 2018-12-27 ASSESSMENT — COPD QUESTIONNAIRES: COPD: 1

## 2018-12-28 ENCOUNTER — APPOINTMENT (OUTPATIENT)
Dept: RADIOLOGY | Facility: MEDICAL CENTER | Age: 78
DRG: 252 | End: 2018-12-28
Attending: SURGERY
Payer: MEDICARE

## 2018-12-28 RX ORDER — SODIUM CHLORIDE, SODIUM LACTATE, POTASSIUM CHLORIDE, CALCIUM CHLORIDE 600; 310; 30; 20 MG/100ML; MG/100ML; MG/100ML; MG/100ML
INJECTION, SOLUTION INTRAVENOUS CONTINUOUS
Status: CANCELLED | OUTPATIENT
Start: 2018-12-28

## 2018-12-28 RX ORDER — HALOPERIDOL 5 MG/ML
1 INJECTION INTRAMUSCULAR
Status: CANCELLED | OUTPATIENT
Start: 2018-12-28

## 2018-12-28 RX ORDER — HYDROMORPHONE HYDROCHLORIDE 1 MG/ML
0.1 INJECTION, SOLUTION INTRAMUSCULAR; INTRAVENOUS; SUBCUTANEOUS
Status: CANCELLED | OUTPATIENT
Start: 2018-12-28

## 2018-12-28 RX ORDER — CELECOXIB 200 MG/1
200 CAPSULE ORAL ONCE
Status: CANCELLED | OUTPATIENT
Start: 2018-12-28 | End: 2018-12-28

## 2018-12-28 RX ORDER — OXYCODONE HCL 5 MG/5 ML
5 SOLUTION, ORAL ORAL
Status: CANCELLED | OUTPATIENT
Start: 2018-12-28

## 2018-12-28 RX ORDER — HYDROMORPHONE HYDROCHLORIDE 1 MG/ML
0.4 INJECTION, SOLUTION INTRAMUSCULAR; INTRAVENOUS; SUBCUTANEOUS
Status: CANCELLED | OUTPATIENT
Start: 2018-12-28

## 2018-12-28 RX ORDER — ACETAMINOPHEN 500 MG
1000 TABLET ORAL ONCE
Status: CANCELLED | OUTPATIENT
Start: 2018-12-28 | End: 2018-12-28

## 2018-12-28 RX ORDER — MEPERIDINE HYDROCHLORIDE 25 MG/ML
12.5 INJECTION INTRAMUSCULAR; INTRAVENOUS; SUBCUTANEOUS
Status: CANCELLED | OUTPATIENT
Start: 2018-12-28

## 2018-12-28 RX ORDER — HYDRALAZINE HYDROCHLORIDE 20 MG/ML
5 INJECTION INTRAMUSCULAR; INTRAVENOUS
Status: CANCELLED | OUTPATIENT
Start: 2018-12-28

## 2018-12-28 RX ORDER — OXYCODONE HCL 5 MG/5 ML
10 SOLUTION, ORAL ORAL
Status: CANCELLED | OUTPATIENT
Start: 2018-12-28

## 2018-12-28 RX ORDER — ONDANSETRON 2 MG/ML
4 INJECTION INTRAMUSCULAR; INTRAVENOUS
Status: CANCELLED | OUTPATIENT
Start: 2018-12-28

## 2018-12-28 RX ORDER — HYDROMORPHONE HYDROCHLORIDE 1 MG/ML
0.2 INJECTION, SOLUTION INTRAMUSCULAR; INTRAVENOUS; SUBCUTANEOUS
Status: CANCELLED | OUTPATIENT
Start: 2018-12-28

## 2018-12-28 RX ORDER — DIPHENHYDRAMINE HYDROCHLORIDE 50 MG/ML
12.5 INJECTION INTRAMUSCULAR; INTRAVENOUS
Status: CANCELLED | OUTPATIENT
Start: 2018-12-28

## 2018-12-28 NOTE — PROGRESS NOTES
"Subjective:      Parmjit Castelan is a 78 y.o. male who presents with Cough (Couple days dry cough and congestion .)            Cough   This is a new problem. Episode onset: 2 days  The problem has been rapidly worsening. Associated symptoms include chills, nasal congestion, a sore throat (slight sore throat on the left ), shortness of breath (mild) and wheezing. Pertinent negatives include no chest pain, ear congestion, ear pain, fever, headaches, hemoptysis, myalgias, rash, rhinorrhea or sweats. He has tried steroid inhaler for the symptoms. His past medical history is significant for bronchitis and COPD.     Patient was scheduled for surgery tomorrow but his surgery was cancelled due to his recent onset of cough.  He is concerned about being exposed to pneumonia.  He has hx of COPD, Bronchitis, and CKD- currently on Dialysis.    Past Medical History:   Diagnosis Date   • Anesthesia     \"needs more sedation\" (can sometimes hear MD during procedure)    • Arterial leg ulcer ([facility]) 11/8/2018   • Basal cell carcinoma of left cheek 3/26/2015   • BPH 7/14/2009   • Bronchitis 1/1/13   • CAD (coronary artery disease) 2/20/2014   • CATARACT     sanjuanita surgery complete   • Chronic respiratory failure with hypoxia ([facility]) 5/8/2016   • CKD (chronic kidney disease) stage 4, GFR 15-29 ml/min ([facility]) 1/15/2010    end stage renal disease   • COPD (chronic obstructive pulmonary disease) ([facility])    • COPD (chronic obstructive pulmonary disease) ([facility]) 8/2/2011   • Detached retina    • Dialysis     m,w,f [facility]   • EMPHYSEMA    • Gout    • Heart burn     occas   • High cholesterol    • HTN (hypertension) 1/15/2010   • Indigestion     occas   • Kidney cyst    • Leg pain, bilateral 8/10/2015   • On supplemental oxygen therapy    • Peripheral vascular disease (HCC) 8/10/2015   • Pneumonia    • Primary insomnia 3/22/2018   • Proteinuria    • Sleep apnea     O2 PER CANULA  AT NIGHT 2l/m   • Snoring        Past Surgical History:   Procedure Laterality " Date   • ENDOSCOPY PROCEDURE  3/21/2018    Procedure: ENDOSCOPY PROCEDURE/UPPER;  Surgeon: Enrique Child D.O.;  Location: SURGERY [facility];  Service: Gastroenterology   • COLONOSCOPY - ENDO  8/15/2016    Procedure: COLONOSCOPY - ENDO;  Surgeon: Mane Whatley M.D.;  Location: ENDOSCOPY [facility] [place];  Service:    • GASTROSCOPY WITH BIOPSY  8/13/2016    Procedure: GASTROSCOPY WITH BIOPSY;  Surgeon: Jorge Leavitt M.D.;  Location: ENDOSCOPY [facility] [place];  Service:    • RECOVERY  12/23/2015    Procedure: VASCULAR CASE-BLANKA-LEFT ARM FISTULOGRAM WITH ANGIOPLASTY;  Surgeon: Recoveryonly Surgery;  Location: SURGERY PRE-POST PROC UNIT [facility] [place] [place];  Service:    • RECOVERY  3/24/2015    Performed by Recoveryonly Surgery at SURGERY PRE-POST PROC UNIT [facility] [place] [place]   • RECOVERY  7/29/2014    Performed by Ir-Recovery Surgery at SURGERY SAME DAY [facility]   • RECOVERY  3/24/2014    Performed by Ir-Recovery Surgery at [facility]   • RECOVERY  12/17/2013    Performed by Ir-Recovery Surgery at SURGERY SAME DAY [facility]   • RECOVERY  7/2/2013    Performed by Ir-Recovery Surgery at SURGERY SAME DAY [facility]   • AV FISTULA THROMBOLYSIS  7/2/2013    Performed by David Cason M.D. at SURGERY [facility]   • RECOVERY  1/29/2013    Performed by Ir-Recovery Surgery at SURGERY SAME DAY [facility]   • RECOVERY  7/23/2012    Performed by SURGERY, IR-RECOVERY at SURGERY SAME DAY [facility]   • VITRECTOMY POSTERIOR  10/11/2011    Performed by NAHUM GE at SURGERY SAME DAY [facility]   • RECOVERY  8/12/2011    Performed by SURGERY, IR-RECOVERY at SURGERY SAME DAY [facility]   • VITRECTOMY POSTERIOR  1/18/2011    Performed by ANHUM GE at SURGERY SAME DAY [facility]   • SCLERAL BUCKLING  1/18/2011    Performed by NAHUM GE at SURGERY SAME DAY [facility]   • AV FISTULOGRAM  9/17/2010    Performed by DAVID CASON at [facility]   • ANGIOPLASTY BALLOON  9/17/2010  "   Performed by ALESHIA MOSCOSO at SURGERY [facility]   • AV FISTULOGRAM  7/23/2010    Performed by ALESHIA MOSCOSO at SURGERY [facility]   • ANGIOPLASTY BALLOON  7/23/2010    Performed by ALESHIA MOSCOSO at SURGERY [facility]   • AV FISTULA REVISION  2/19/2010    Performed by WILLIE HATCH at SURGERY [facility]   • AV FISTULA CREATION  2/12/2010    Performed by ALESHIA MOSCOSO at SURGERY [facility]   • CATARACT PHACO WITH IOL  4/8/08    Performed by STACEY PHILLIPS at SURGERY SAME DAY [facility]         Family History   Problem Relation Age of Onset   • Stroke Mother    • Hypertension Mother    • Lung Disease Father         Emphysema, resp failure   • Genitourinary () Maternal Aunt         hematuria   • Hypertension Brother        Medications, Allergies, and current problem list reviewed today in Epic      Review of Systems   Constitutional: Positive for chills and malaise/fatigue. Negative for fever.   HENT: Positive for sore throat (slight sore throat on the left ). Negative for congestion, ear discharge, ear pain, rhinorrhea and sinus pain.    Respiratory: Positive for cough, sputum production (yellow mucous ), shortness of breath (mild) and wheezing. Negative for hemoptysis.    Cardiovascular: Negative for chest pain, palpitations and leg swelling.   Gastrointestinal: Negative for abdominal pain, diarrhea, nausea and vomiting.   Musculoskeletal: Negative for myalgias.   Skin: Negative for rash.   Neurological: Negative for headaches.     All other systems reviewed and are negative.        Objective:     /78 (BP Location: Left arm, Patient Position: Sitting, BP Cuff Size: Adult)   Pulse 84   Temp 36.8 °C (98.3 °F) (Temporal)   Resp 18   Ht 1.727 m (5' 8\")   Wt 73.5 kg (162 lb)   SpO2 95%   BMI 24.63 kg/m²      Physical Exam   Constitutional: He is oriented to person, place, and time. He appears well-developed and well-nourished.   HENT:   Head: " Normocephalic and atraumatic.   Right Ear: Tympanic membrane, external ear and ear canal normal.   Left Ear: Tympanic membrane, external ear and ear canal normal.   Nose: Mucosal edema and rhinorrhea (clear ) present.   Mouth/Throat: Uvula is midline and mucous membranes are normal. Posterior oropharyngeal erythema (mild erythema ) present. No tonsillar exudate.   Eyes: Conjunctivae are normal.   Neck: Neck supple.   Cardiovascular: Normal rate, regular rhythm and normal heart sounds.  Exam reveals no gallop and no friction rub.    No murmur heard.  Pulmonary/Chest: Effort normal. No respiratory distress. He has no decreased breath sounds. He has wheezes (diffuse wheezes ). He has no rhonchi. He has no rales.   Lymphadenopathy:     He has no cervical adenopathy.   Neurological: He is alert and oriented to person, place, and time. No cranial nerve deficit.   Skin: Skin is warm and dry. No rash noted.   Psychiatric: He has a normal mood and affect. His behavior is normal. Judgment and thought content normal.             12/27/2018 5:29 PM    HISTORY/REASON FOR EXAM:  Cough and congestion    TECHNIQUE/EXAM DESCRIPTION AND NUMBER OF VIEWS:  Two views of the chest.    COMPARISON: 3/19/2018    FINDINGS:  There is bibasilar linear atelectasis.  The heart is enlarged.  There is no evidence of pleural effusion.  Soft tissues and bony structures are unremarkable.     Impression       1.  Cardiomegaly.    2.  Bibasilar atelectasis.            Assessment/Plan:     1. Bronchitis  DX-CHEST-2 VIEWS    doxycycline (VIBRAMYCIN) 100 MG Tab    albuterol 108 (90 Base) MCG/ACT Aero Soln inhalation aerosol       Early COPD exacerbation.    Current Outpatient Prescriptions:   •  doxycycline (VIBRAMYCIN) 100 MG Tab, Take 1 Tab by mouth 2 times a day for 7 days., Disp: 14 Tab, Rfl: 0    •  albuterol 108 (90 Base) MCG/ACT Aero Soln inhalation aerosol, Inhale 1-2 Puffs by mouth every 6 hours as needed for Shortness of Breath., Disp: 8.5 g,  Rfl: 0       Differential diagnoses, Supportive care, and indications for immediate follow-up discussed with patient.   Instructed to return to clinic or nearest emergency department for any change in condition, further concerns, or worsening of symptoms.    The patient demonstrated a good understanding and agreed with the treatment plan.    Aurora Walker P.A.-C.

## 2019-01-16 ENCOUNTER — HOSPITAL ENCOUNTER (OUTPATIENT)
Dept: LAB | Facility: MEDICAL CENTER | Age: 79
End: 2019-01-16
Attending: SURGERY
Payer: MEDICARE

## 2019-01-16 LAB
ALBUMIN SERPL BCP-MCNC: 4.3 G/DL (ref 3.2–4.9)
ALBUMIN/GLOB SERPL: 1.7 G/DL
ALP SERPL-CCNC: 73 U/L (ref 30–99)
ALT SERPL-CCNC: 12 U/L (ref 2–50)
ANION GAP SERPL CALC-SCNC: 12 MMOL/L (ref 0–11.9)
AST SERPL-CCNC: 19 U/L (ref 12–45)
BASOPHILS # BLD AUTO: 1.5 % (ref 0–1.8)
BASOPHILS # BLD: 0.05 K/UL (ref 0–0.12)
BILIRUB SERPL-MCNC: 0.7 MG/DL (ref 0.1–1.5)
BUN SERPL-MCNC: 50 MG/DL (ref 8–22)
CALCIUM SERPL-MCNC: 10.1 MG/DL (ref 8.5–10.5)
CHLORIDE SERPL-SCNC: 99 MMOL/L (ref 96–112)
CO2 SERPL-SCNC: 29 MMOL/L (ref 20–33)
CREAT SERPL-MCNC: 9.49 MG/DL (ref 0.5–1.4)
EOSINOPHIL # BLD AUTO: 0.16 K/UL (ref 0–0.51)
EOSINOPHIL NFR BLD: 4.9 % (ref 0–6.9)
ERYTHROCYTE [DISTWIDTH] IN BLOOD BY AUTOMATED COUNT: 55.8 FL (ref 35.9–50)
FASTING STATUS PATIENT QL REPORTED: NORMAL
GLOBULIN SER CALC-MCNC: 2.6 G/DL (ref 1.9–3.5)
GLUCOSE SERPL-MCNC: 91 MG/DL (ref 65–99)
HCT VFR BLD AUTO: 35.9 % (ref 42–52)
HGB BLD-MCNC: 11.2 G/DL (ref 14–18)
IMM GRANULOCYTES # BLD AUTO: 0 K/UL (ref 0–0.11)
IMM GRANULOCYTES NFR BLD AUTO: 0 % (ref 0–0.9)
LYMPHOCYTES # BLD AUTO: 0.49 K/UL (ref 1–4.8)
LYMPHOCYTES NFR BLD: 15.1 % (ref 22–41)
MCH RBC QN AUTO: 31.5 PG (ref 27–33)
MCHC RBC AUTO-ENTMCNC: 31.2 G/DL (ref 33.7–35.3)
MCV RBC AUTO: 101.1 FL (ref 81.4–97.8)
MONOCYTES # BLD AUTO: 0.39 K/UL (ref 0–0.85)
MONOCYTES NFR BLD AUTO: 12 % (ref 0–13.4)
NEUTROPHILS # BLD AUTO: 2.15 K/UL (ref 1.82–7.42)
NEUTROPHILS NFR BLD: 66.5 % (ref 44–72)
NRBC # BLD AUTO: 0 K/UL
NRBC BLD-RTO: 0 /100 WBC
PLATELET # BLD AUTO: 198 K/UL (ref 164–446)
PMV BLD AUTO: 10.3 FL (ref 9–12.9)
POTASSIUM SERPL-SCNC: 4.4 MMOL/L (ref 3.6–5.5)
PROT SERPL-MCNC: 6.9 G/DL (ref 6–8.2)
RBC # BLD AUTO: 3.55 M/UL (ref 4.7–6.1)
SODIUM SERPL-SCNC: 140 MMOL/L (ref 135–145)
WBC # BLD AUTO: 3.2 K/UL (ref 4.8–10.8)

## 2019-01-16 PROCEDURE — 80053 COMPREHEN METABOLIC PANEL: CPT

## 2019-01-16 PROCEDURE — 85025 COMPLETE CBC W/AUTO DIFF WBC: CPT

## 2019-01-16 PROCEDURE — 36415 COLL VENOUS BLD VENIPUNCTURE: CPT

## 2019-01-25 ENCOUNTER — APPOINTMENT (OUTPATIENT)
Dept: RADIOLOGY | Facility: MEDICAL CENTER | Age: 79
DRG: 252 | End: 2019-01-25
Attending: SURGERY
Payer: MEDICARE

## 2019-01-25 ENCOUNTER — HOSPITAL ENCOUNTER (INPATIENT)
Facility: MEDICAL CENTER | Age: 79
LOS: 2 days | DRG: 252 | End: 2019-01-27
Attending: SURGERY | Admitting: SURGERY
Payer: MEDICARE

## 2019-01-25 DIAGNOSIS — I73.9 PERIPHERAL VASCULAR DISEASE (HCC): ICD-10-CM

## 2019-01-25 LAB
ABO GROUP BLD: NORMAL
BLD GP AB SCN SERPL QL: NORMAL
HCT VFR BLD CALC: 36 % (ref 42–52)
HGB BLD-MCNC: 12.2 G/DL (ref 14–18)
INR PPP: 1.18 (ref 0.87–1.13)
PATHOLOGY CONSULT NOTE: NORMAL
POTASSIUM BLD-SCNC: 4 MMOL/L (ref 3.6–5.5)
PROTHROMBIN TIME: 15.1 SEC (ref 12–14.6)
RH BLD: NORMAL
SODIUM BLD-SCNC: 139 MMOL/L (ref 135–145)

## 2019-01-25 PROCEDURE — 84295 ASSAY OF SERUM SODIUM: CPT

## 2019-01-25 PROCEDURE — B41G1ZZ FLUOROSCOPY OF LEFT LOWER EXTREMITY ARTERIES USING LOW OSMOLAR CONTRAST: ICD-10-PCS | Performed by: SURGERY

## 2019-01-25 PROCEDURE — 160029 HCHG SURGERY MINUTES - 1ST 30 MINS LEVEL 4: Performed by: SURGERY

## 2019-01-25 PROCEDURE — 501445 HCHG STAPLER, SKIN DISP: Performed by: SURGERY

## 2019-01-25 PROCEDURE — 700102 HCHG RX REV CODE 250 W/ 637 OVERRIDE(OP): Performed by: SURGERY

## 2019-01-25 PROCEDURE — 700102 HCHG RX REV CODE 250 W/ 637 OVERRIDE(OP): Performed by: ANESTHESIOLOGY

## 2019-01-25 PROCEDURE — 36415 COLL VENOUS BLD VENIPUNCTURE: CPT

## 2019-01-25 PROCEDURE — 160009 HCHG ANES TIME/MIN: Performed by: SURGERY

## 2019-01-25 PROCEDURE — 770006 HCHG ROOM/CARE - MED/SURG/GYN SEMI*

## 2019-01-25 PROCEDURE — 700111 HCHG RX REV CODE 636 W/ 250 OVERRIDE (IP): Performed by: ANESTHESIOLOGY

## 2019-01-25 PROCEDURE — 86901 BLOOD TYPING SEROLOGIC RH(D): CPT

## 2019-01-25 PROCEDURE — 84132 ASSAY OF SERUM POTASSIUM: CPT

## 2019-01-25 PROCEDURE — 501838 HCHG SUTURE GENERAL: Performed by: SURGERY

## 2019-01-25 PROCEDURE — 86900 BLOOD TYPING SEROLOGIC ABO: CPT

## 2019-01-25 PROCEDURE — 160041 HCHG SURGERY MINUTES - EA ADDL 1 MIN LEVEL 4: Performed by: SURGERY

## 2019-01-25 PROCEDURE — 85014 HEMATOCRIT: CPT

## 2019-01-25 PROCEDURE — 04UL0JZ SUPPLEMENT LEFT FEMORAL ARTERY WITH SYNTHETIC SUBSTITUTE, OPEN APPROACH: ICD-10-PCS | Performed by: SURGERY

## 2019-01-25 PROCEDURE — 160002 HCHG RECOVERY MINUTES (STAT): Performed by: SURGERY

## 2019-01-25 PROCEDURE — A9270 NON-COVERED ITEM OR SERVICE: HCPCS | Performed by: ANESTHESIOLOGY

## 2019-01-25 PROCEDURE — 160036 HCHG PACU - EA ADDL 30 MINS PHASE I: Performed by: SURGERY

## 2019-01-25 PROCEDURE — A9270 NON-COVERED ITEM OR SERVICE: HCPCS | Performed by: SURGERY

## 2019-01-25 PROCEDURE — 04CL0ZZ EXTIRPATION OF MATTER FROM LEFT FEMORAL ARTERY, OPEN APPROACH: ICD-10-PCS | Performed by: SURGERY

## 2019-01-25 PROCEDURE — 700101 HCHG RX REV CODE 250

## 2019-01-25 PROCEDURE — 501837 HCHG SUTURE CV: Performed by: SURGERY

## 2019-01-25 PROCEDURE — C1768 GRAFT, VASCULAR: HCPCS | Performed by: SURGERY

## 2019-01-25 PROCEDURE — C2628 CATHETER, OCCLUSION: HCPCS | Performed by: SURGERY

## 2019-01-25 PROCEDURE — 700111 HCHG RX REV CODE 636 W/ 250 OVERRIDE (IP): Performed by: SURGERY

## 2019-01-25 PROCEDURE — 160048 HCHG OR STATISTICAL LEVEL 1-5: Performed by: SURGERY

## 2019-01-25 PROCEDURE — 86850 RBC ANTIBODY SCREEN: CPT

## 2019-01-25 PROCEDURE — 160035 HCHG PACU - 1ST 60 MINS PHASE I: Performed by: SURGERY

## 2019-01-25 PROCEDURE — 302129 PCA PLUS: Performed by: SURGERY

## 2019-01-25 PROCEDURE — C1725 CATH, TRANSLUMIN NON-LASER: HCPCS | Performed by: SURGERY

## 2019-01-25 PROCEDURE — 85610 PROTHROMBIN TIME: CPT

## 2019-01-25 PROCEDURE — 700111 HCHG RX REV CODE 636 W/ 250 OVERRIDE (IP)

## 2019-01-25 PROCEDURE — 502240 HCHG MISC OR SUPPLY RC 0272: Performed by: SURGERY

## 2019-01-25 PROCEDURE — 110454 HCHG SHELL REV 250: Performed by: SURGERY

## 2019-01-25 PROCEDURE — 04CN0ZZ EXTIRPATION OF MATTER FROM LEFT POPLITEAL ARTERY, OPEN APPROACH: ICD-10-PCS | Performed by: SURGERY

## 2019-01-25 PROCEDURE — 88300 SURGICAL PATH GROSS: CPT | Mod: 59

## 2019-01-25 DEVICE — GRAFT GORE PROPATEN 6MMX80CM: Type: IMPLANTABLE DEVICE | Status: FUNCTIONAL

## 2019-01-25 RX ORDER — HYDRALAZINE HYDROCHLORIDE 20 MG/ML
5 INJECTION INTRAMUSCULAR; INTRAVENOUS
Status: DISCONTINUED | OUTPATIENT
Start: 2019-01-25 | End: 2019-01-25 | Stop reason: HOSPADM

## 2019-01-25 RX ORDER — SODIUM CHLORIDE 9 MG/ML
INJECTION, SOLUTION INTRAVENOUS ONCE
Status: COMPLETED | OUTPATIENT
Start: 2019-01-25 | End: 2019-01-25

## 2019-01-25 RX ORDER — SODIUM CHLORIDE 9 MG/ML
INJECTION, SOLUTION INTRAVENOUS
Status: COMPLETED
Start: 2019-01-25 | End: 2019-01-25

## 2019-01-25 RX ORDER — DIPHENHYDRAMINE HYDROCHLORIDE 50 MG/ML
12.5 INJECTION INTRAMUSCULAR; INTRAVENOUS
Status: DISCONTINUED | OUTPATIENT
Start: 2019-01-25 | End: 2019-01-25 | Stop reason: HOSPADM

## 2019-01-25 RX ORDER — HEPARIN SODIUM 5000 [USP'U]/ML
5000 INJECTION, SOLUTION INTRAVENOUS; SUBCUTANEOUS EVERY 8 HOURS
Status: DISCONTINUED | OUTPATIENT
Start: 2019-01-26 | End: 2019-01-27 | Stop reason: HOSPADM

## 2019-01-25 RX ORDER — WARFARIN SODIUM 5 MG/1
5 TABLET ORAL
Status: DISCONTINUED | OUTPATIENT
Start: 2019-01-25 | End: 2019-01-27 | Stop reason: HOSPADM

## 2019-01-25 RX ORDER — ACETAMINOPHEN 500 MG
1000 TABLET ORAL EVERY 6 HOURS
Status: DISCONTINUED | OUTPATIENT
Start: 2019-01-25 | End: 2019-01-27 | Stop reason: HOSPADM

## 2019-01-25 RX ORDER — CLONIDINE HYDROCHLORIDE 0.1 MG/1
0.2 TABLET ORAL TWICE DAILY
Status: ACTIVE | OUTPATIENT
Start: 2019-01-25 | End: 2019-01-26

## 2019-01-25 RX ORDER — PRAVASTATIN SODIUM 20 MG
20 TABLET ORAL NIGHTLY
Status: ON HOLD | COMMUNITY
End: 2020-02-26

## 2019-01-25 RX ORDER — BACITRACIN 50000 [IU]/1
INJECTION, POWDER, FOR SOLUTION INTRAMUSCULAR
Status: DISCONTINUED | OUTPATIENT
Start: 2019-01-25 | End: 2019-01-25 | Stop reason: HOSPADM

## 2019-01-25 RX ORDER — HYDRALAZINE HYDROCHLORIDE 20 MG/ML
10 INJECTION INTRAMUSCULAR; INTRAVENOUS EVERY 4 HOURS PRN
Status: DISCONTINUED | OUTPATIENT
Start: 2019-01-25 | End: 2019-01-27 | Stop reason: HOSPADM

## 2019-01-25 RX ORDER — BUPIVACAINE HYDROCHLORIDE AND EPINEPHRINE 5; 5 MG/ML; UG/ML
INJECTION, SOLUTION EPIDURAL; INTRACAUDAL; PERINEURAL
Status: DISCONTINUED | OUTPATIENT
Start: 2019-01-25 | End: 2019-01-25 | Stop reason: HOSPADM

## 2019-01-25 RX ORDER — MIDAZOLAM HYDROCHLORIDE 1 MG/ML
1 INJECTION INTRAMUSCULAR; INTRAVENOUS
Status: DISCONTINUED | OUTPATIENT
Start: 2019-01-25 | End: 2019-01-25 | Stop reason: HOSPADM

## 2019-01-25 RX ORDER — BUDESONIDE AND FORMOTEROL FUMARATE DIHYDRATE 160; 4.5 UG/1; UG/1
2 AEROSOL RESPIRATORY (INHALATION) 2 TIMES DAILY
COMMUNITY
End: 2019-06-12

## 2019-01-25 RX ORDER — HYDROMORPHONE HYDROCHLORIDE 1 MG/ML
0.4 INJECTION, SOLUTION INTRAMUSCULAR; INTRAVENOUS; SUBCUTANEOUS
Status: DISCONTINUED | OUTPATIENT
Start: 2019-01-25 | End: 2019-01-25 | Stop reason: HOSPADM

## 2019-01-25 RX ORDER — OXYCODONE HCL 5 MG/5 ML
10 SOLUTION, ORAL ORAL
Status: COMPLETED | OUTPATIENT
Start: 2019-01-25 | End: 2019-01-25

## 2019-01-25 RX ORDER — METOPROLOL TARTRATE 1 MG/ML
1 INJECTION, SOLUTION INTRAVENOUS
Status: DISCONTINUED | OUTPATIENT
Start: 2019-01-25 | End: 2019-01-25 | Stop reason: HOSPADM

## 2019-01-25 RX ORDER — HALOPERIDOL 5 MG/ML
1 INJECTION INTRAMUSCULAR EVERY 6 HOURS PRN
Status: DISCONTINUED | OUTPATIENT
Start: 2019-01-25 | End: 2019-01-27 | Stop reason: HOSPADM

## 2019-01-25 RX ORDER — ONDANSETRON 2 MG/ML
4 INJECTION INTRAMUSCULAR; INTRAVENOUS EVERY 4 HOURS PRN
Status: DISCONTINUED | OUTPATIENT
Start: 2019-01-25 | End: 2019-01-27 | Stop reason: HOSPADM

## 2019-01-25 RX ORDER — SCOLOPAMINE TRANSDERMAL SYSTEM 1 MG/1
1 PATCH, EXTENDED RELEASE TRANSDERMAL
Status: DISCONTINUED | OUTPATIENT
Start: 2019-01-25 | End: 2019-01-27 | Stop reason: HOSPADM

## 2019-01-25 RX ORDER — SODIUM CHLORIDE 9 MG/ML
INJECTION, SOLUTION INTRAVENOUS CONTINUOUS
Status: DISCONTINUED | OUTPATIENT
Start: 2019-01-25 | End: 2019-01-25 | Stop reason: HOSPADM

## 2019-01-25 RX ORDER — DOXAZOSIN MESYLATE 4 MG/1
4 TABLET ORAL EVERY EVENING
COMMUNITY
End: 2019-08-13

## 2019-01-25 RX ORDER — ROPINIROLE 0.5 MG/1
1 TABLET, FILM COATED ORAL
COMMUNITY
End: 2019-07-10

## 2019-01-25 RX ORDER — IPRATROPIUM BROMIDE AND ALBUTEROL SULFATE 2.5; .5 MG/3ML; MG/3ML
3 SOLUTION RESPIRATORY (INHALATION)
Status: DISCONTINUED | OUTPATIENT
Start: 2019-01-25 | End: 2019-01-25 | Stop reason: HOSPADM

## 2019-01-25 RX ORDER — DEXAMETHASONE SODIUM PHOSPHATE 4 MG/ML
4 INJECTION, SOLUTION INTRA-ARTICULAR; INTRALESIONAL; INTRAMUSCULAR; INTRAVENOUS; SOFT TISSUE
Status: DISCONTINUED | OUTPATIENT
Start: 2019-01-25 | End: 2019-01-27 | Stop reason: HOSPADM

## 2019-01-25 RX ORDER — FLUTICASONE PROPIONATE 50 MCG
2 SPRAY, SUSPENSION (ML) NASAL DAILY
COMMUNITY
End: 2019-06-25

## 2019-01-25 RX ORDER — CLONIDINE HYDROCHLORIDE 0.1 MG/1
0.1 TABLET ORAL
Status: DISCONTINUED | OUTPATIENT
Start: 2019-01-26 | End: 2019-01-27 | Stop reason: HOSPADM

## 2019-01-25 RX ORDER — HEPARIN SODIUM,PORCINE 1000/ML
VIAL (ML) INJECTION
Status: DISCONTINUED | OUTPATIENT
Start: 2019-01-25 | End: 2019-01-25 | Stop reason: HOSPADM

## 2019-01-25 RX ORDER — OXYCODONE HCL 5 MG/5 ML
5 SOLUTION, ORAL ORAL
Status: COMPLETED | OUTPATIENT
Start: 2019-01-25 | End: 2019-01-25

## 2019-01-25 RX ORDER — HYDROMORPHONE HYDROCHLORIDE 1 MG/ML
0.2 INJECTION, SOLUTION INTRAMUSCULAR; INTRAVENOUS; SUBCUTANEOUS
Status: DISCONTINUED | OUTPATIENT
Start: 2019-01-25 | End: 2019-01-25 | Stop reason: HOSPADM

## 2019-01-25 RX ORDER — DIPHENHYDRAMINE HYDROCHLORIDE 50 MG/ML
25 INJECTION INTRAMUSCULAR; INTRAVENOUS EVERY 6 HOURS PRN
Status: DISCONTINUED | OUTPATIENT
Start: 2019-01-25 | End: 2019-01-27 | Stop reason: HOSPADM

## 2019-01-25 RX ORDER — HALOPERIDOL 5 MG/ML
1 INJECTION INTRAMUSCULAR
Status: DISCONTINUED | OUTPATIENT
Start: 2019-01-25 | End: 2019-01-25 | Stop reason: HOSPADM

## 2019-01-25 RX ORDER — HYDROMORPHONE HYDROCHLORIDE 1 MG/ML
0.1 INJECTION, SOLUTION INTRAMUSCULAR; INTRAVENOUS; SUBCUTANEOUS
Status: DISCONTINUED | OUTPATIENT
Start: 2019-01-25 | End: 2019-01-25 | Stop reason: HOSPADM

## 2019-01-25 RX ORDER — ONDANSETRON 2 MG/ML
4 INJECTION INTRAMUSCULAR; INTRAVENOUS
Status: DISCONTINUED | OUTPATIENT
Start: 2019-01-25 | End: 2019-01-25 | Stop reason: HOSPADM

## 2019-01-25 RX ORDER — HYDROMORPHONE HYDROCHLORIDE 1 MG/ML
0.5 INJECTION, SOLUTION INTRAMUSCULAR; INTRAVENOUS; SUBCUTANEOUS ONCE
Status: COMPLETED | OUTPATIENT
Start: 2019-01-25 | End: 2019-01-25

## 2019-01-25 RX ADMIN — HYDROMORPHONE HYDROCHLORIDE 0.5 MG: 1 INJECTION, SOLUTION INTRAMUSCULAR; INTRAVENOUS; SUBCUTANEOUS at 16:08

## 2019-01-25 RX ADMIN — HYDRALAZINE HYDROCHLORIDE 10 MG: 20 INJECTION INTRAMUSCULAR; INTRAVENOUS at 20:42

## 2019-01-25 RX ADMIN — Medication: at 17:57

## 2019-01-25 RX ADMIN — OXYCODONE HYDROCHLORIDE 10 MG: 5 SOLUTION ORAL at 12:43

## 2019-01-25 RX ADMIN — WARFARIN SODIUM 5 MG: 5 TABLET ORAL at 18:41

## 2019-01-25 RX ADMIN — SODIUM CHLORIDE: 9 INJECTION, SOLUTION INTRAVENOUS at 07:19

## 2019-01-25 RX ADMIN — HYDROMORPHONE HYDROCHLORIDE 0.2 MG: 1 INJECTION, SOLUTION INTRAMUSCULAR; INTRAVENOUS; SUBCUTANEOUS at 13:37

## 2019-01-25 RX ADMIN — ACETAMINOPHEN 1000 MG: 500 TABLET ORAL at 18:03

## 2019-01-25 ASSESSMENT — COGNITIVE AND FUNCTIONAL STATUS - GENERAL
MOBILITY SCORE: 24
SUGGESTED CMS G CODE MODIFIER DAILY ACTIVITY: CH
SUGGESTED CMS G CODE MODIFIER MOBILITY: CH
DAILY ACTIVITIY SCORE: 24

## 2019-01-25 ASSESSMENT — PATIENT HEALTH QUESTIONNAIRE - PHQ9
2. FEELING DOWN, DEPRESSED, IRRITABLE, OR HOPELESS: NOT AT ALL
1. LITTLE INTEREST OR PLEASURE IN DOING THINGS: NOT AT ALL
SUM OF ALL RESPONSES TO PHQ9 QUESTIONS 1 AND 2: 0

## 2019-01-25 ASSESSMENT — LIFESTYLE VARIABLES
EVER_SMOKED: YES
ALCOHOL_USE: NO

## 2019-01-25 NOTE — CARE PLAN
Problem: Communication  Goal: The ability to communicate needs accurately and effectively will improve  Outcome: PROGRESSING AS EXPECTED  Pt updated on POC, all questions answered at this time     Problem: Safety  Goal: Will remain free from falls  Outcome: PROGRESSING AS EXPECTED  Call light is within reach, treaded socks on, bed in lowest position, personal belongings are within reach, all needs met at this time

## 2019-01-25 NOTE — OR NURSING
"Pt on 2 L NC, pt uses 2 L NC at night.  No c/o nausea, tolerating sips of fluid with medication.  Pain tolerable now per pt, discomfort described as a \"burning in my heel.\"  Left foot elevated on pillow to lift heel off the gurney.  Left leg dressings x3, CDI.  Left groin CDI and soft.  Left pedal pulse heard with doppler, right pedal pulse absent, right tibial pulse heard with doppler, faint.  Left AV fistula positive bruit and thrill.  VSS, afebrile, LESLIE, A/O x4.  Pt sleepy, wakes easily to voice.      Belongings on gurney.    "

## 2019-01-25 NOTE — PROGRESS NOTES
Report received from RN, assumed care at 1500  Pt is A0X4, and responds appropriately   Pt declines any SOB, chest pain, new onset of numbness/ tingiling  Pt rates pain at 9/10, on a scale of 1-10,   Pt has yet to void since being on floor from procedure   Pt has - flatus, + bowel sounds,  BM on 1/25/2019 PTA  Pt is NPO, pt denies any nausea/vomiting  Pt has left groin incision site, left upper thigh incision site and posterior ankle site, all dressings are clean,dry and intact, no drainage noted at this time  Pt has left arm fistula in place, bruit and thrill present   Admit complete   Skin check complete   Pulses heard with doppler     Plan of care discussed, all questions answered. Explained importance of calling before getting OOB and pt verbalizes understanding. Explained importance of oral care. Call light is within reach, treaded slipper socks on, bed in lowest/ locked position, hourly rounding in place, all needs met at this time

## 2019-01-25 NOTE — OR NURSING
POC reviewed with pt and ride. Call light within reach, educated to call for assistance if needed. ISTAT K+ = 4.0.  L forearm dialysis fistula positive for bruit and thrill. Engorged, Dr. Cason evaluated fistula and L foot sore at bedside.

## 2019-01-25 NOTE — PROGRESS NOTES
Vascular:    Left forearm AV fistula has stenoses shown by Duplex.  Angioplasty indicated and if successful will help with fistula maturation.  The vein is a little deep and small.  No currently ready for dialysis.  Explained to patient who accepts.    Plan NPO for fistulagram this afternoon.

## 2019-01-26 LAB
ANION GAP SERPL CALC-SCNC: 15 MMOL/L (ref 0–11.9)
BUN SERPL-MCNC: 51 MG/DL (ref 8–22)
CALCIUM SERPL-MCNC: 10.2 MG/DL (ref 8.5–10.5)
CHLORIDE SERPL-SCNC: 98 MMOL/L (ref 96–112)
CO2 SERPL-SCNC: 23 MMOL/L (ref 20–33)
CREAT SERPL-MCNC: 10.09 MG/DL (ref 0.5–1.4)
ERYTHROCYTE [DISTWIDTH] IN BLOOD BY AUTOMATED COUNT: 57.4 FL (ref 35.9–50)
GLUCOSE SERPL-MCNC: 94 MG/DL (ref 65–99)
HAV IGM SERPL QL IA: NEGATIVE
HBV CORE IGM SER QL: NEGATIVE
HBV SURFACE AB SERPL IA-ACNC: 70.92 MIU/ML (ref 0–10)
HBV SURFACE AG SER QL: NEGATIVE
HCT VFR BLD AUTO: 33.9 % (ref 42–52)
HCV AB SER QL: NEGATIVE
HGB BLD-MCNC: 10.6 G/DL (ref 14–18)
INR PPP: 1.19 (ref 0.87–1.13)
MCH RBC QN AUTO: 31.9 PG (ref 27–33)
MCHC RBC AUTO-ENTMCNC: 31.3 G/DL (ref 33.7–35.3)
MCV RBC AUTO: 102.1 FL (ref 81.4–97.8)
PLATELET # BLD AUTO: 185 K/UL (ref 164–446)
PMV BLD AUTO: 10.4 FL (ref 9–12.9)
POTASSIUM SERPL-SCNC: 4.9 MMOL/L (ref 3.6–5.5)
PROTHROMBIN TIME: 15.3 SEC (ref 12–14.6)
RBC # BLD AUTO: 3.32 M/UL (ref 4.7–6.1)
SODIUM SERPL-SCNC: 136 MMOL/L (ref 135–145)
WBC # BLD AUTO: 5.9 K/UL (ref 4.8–10.8)

## 2019-01-26 PROCEDURE — 770006 HCHG ROOM/CARE - MED/SURG/GYN SEMI*

## 2019-01-26 PROCEDURE — 700102 HCHG RX REV CODE 250 W/ 637 OVERRIDE(OP): Performed by: SURGERY

## 2019-01-26 PROCEDURE — 700111 HCHG RX REV CODE 636 W/ 250 OVERRIDE (IP): Performed by: SURGERY

## 2019-01-26 PROCEDURE — 80048 BASIC METABOLIC PNL TOTAL CA: CPT

## 2019-01-26 PROCEDURE — A9270 NON-COVERED ITEM OR SERVICE: HCPCS | Performed by: SURGERY

## 2019-01-26 PROCEDURE — 85610 PROTHROMBIN TIME: CPT

## 2019-01-26 PROCEDURE — 85027 COMPLETE CBC AUTOMATED: CPT

## 2019-01-26 PROCEDURE — 86706 HEP B SURFACE ANTIBODY: CPT

## 2019-01-26 PROCEDURE — 99222 1ST HOSP IP/OBS MODERATE 55: CPT | Performed by: INTERNAL MEDICINE

## 2019-01-26 PROCEDURE — 700101 HCHG RX REV CODE 250

## 2019-01-26 PROCEDURE — 80074 ACUTE HEPATITIS PANEL: CPT

## 2019-01-26 PROCEDURE — 700111 HCHG RX REV CODE 636 W/ 250 OVERRIDE (IP): Performed by: INTERNAL MEDICINE

## 2019-01-26 PROCEDURE — 90935 HEMODIALYSIS ONE EVALUATION: CPT

## 2019-01-26 PROCEDURE — 36415 COLL VENOUS BLD VENIPUNCTURE: CPT

## 2019-01-26 RX ORDER — LIDOCAINE HYDROCHLORIDE 10 MG/ML
INJECTION, SOLUTION INFILTRATION; PERINEURAL
Status: COMPLETED
Start: 2019-01-26 | End: 2019-01-26

## 2019-01-26 RX ORDER — TRAZODONE HYDROCHLORIDE 50 MG/1
150 TABLET ORAL
Status: DISCONTINUED | OUTPATIENT
Start: 2019-01-26 | End: 2019-01-27 | Stop reason: HOSPADM

## 2019-01-26 RX ORDER — LABETALOL 300 MG/1
300 TABLET, FILM COATED ORAL TWICE DAILY
Status: DISCONTINUED | OUTPATIENT
Start: 2019-01-26 | End: 2019-01-27 | Stop reason: HOSPADM

## 2019-01-26 RX ORDER — HEPARIN SODIUM 1000 [USP'U]/ML
1500 INJECTION, SOLUTION INTRAVENOUS; SUBCUTANEOUS
Status: DISCONTINUED | OUTPATIENT
Start: 2019-01-26 | End: 2019-01-27 | Stop reason: HOSPADM

## 2019-01-26 RX ORDER — ENALAPRILAT 1.25 MG/ML
2.5 INJECTION INTRAVENOUS EVERY 4 HOURS PRN
Status: DISCONTINUED | OUTPATIENT
Start: 2019-01-26 | End: 2019-01-27 | Stop reason: HOSPADM

## 2019-01-26 RX ORDER — LISINOPRIL 20 MG/1
40 TABLET ORAL
Status: DISCONTINUED | OUTPATIENT
Start: 2019-01-26 | End: 2019-01-27 | Stop reason: HOSPADM

## 2019-01-26 RX ADMIN — HYDRALAZINE HYDROCHLORIDE 10 MG: 20 INJECTION INTRAMUSCULAR; INTRAVENOUS at 12:52

## 2019-01-26 RX ADMIN — TRAZODONE HYDROCHLORIDE 150 MG: 50 TABLET ORAL at 01:09

## 2019-01-26 RX ADMIN — LIDOCAINE HYDROCHLORIDE 1 ML: 10 INJECTION, SOLUTION INFILTRATION; PERINEURAL at 18:30

## 2019-01-26 RX ADMIN — ENALAPRILAT 2.5 MG: 2.5 INJECTION INTRAVENOUS at 16:43

## 2019-01-26 RX ADMIN — Medication: at 23:36

## 2019-01-26 RX ADMIN — ACETAMINOPHEN 1000 MG: 500 TABLET ORAL at 12:56

## 2019-01-26 RX ADMIN — LISINOPRIL 40 MG: 20 TABLET ORAL at 15:00

## 2019-01-26 RX ADMIN — LABETALOL HCL 300 MG: 300 TABLET, FILM COATED ORAL at 15:05

## 2019-01-26 RX ADMIN — HEPARIN SODIUM 1500 UNITS: 1000 INJECTION, SOLUTION INTRAVENOUS; SUBCUTANEOUS at 18:40

## 2019-01-26 RX ADMIN — ACETAMINOPHEN 1000 MG: 500 TABLET ORAL at 23:33

## 2019-01-26 RX ADMIN — ACETAMINOPHEN 1000 MG: 500 TABLET ORAL at 05:25

## 2019-01-26 RX ADMIN — ACETAMINOPHEN 1000 MG: 500 TABLET ORAL at 00:07

## 2019-01-26 RX ADMIN — WARFARIN SODIUM 5 MG: 5 TABLET ORAL at 17:45

## 2019-01-26 RX ADMIN — Medication 1 ML: at 18:30

## 2019-01-26 NOTE — RESPIRATORY CARE
COPD EDUCATION by COPD CLINICAL EDUCATOR  1/26/2019 at 7:32 AM by Uyen Latham     Patient reviewed by COPD education team. Patient does not qualify for COPD program.

## 2019-01-26 NOTE — PROGRESS NOTES
Report received from RN, assumed care at 0700  Pt is A0X4, and responds appropriately   Pt declines any SOB, chest pain, new onset of numbness/ tingiling  Pt rates pain at 5/10, on a scale of 1-10, pt is on a dilaudid PCA, PCA verified with 2 RN's, pt educated on PCA use   Pt is a dialysis pt and only voids in small increments mainly anuric   Pt has + flatus, + bowel sounds,  BM on 1/25/2019  Pt ambulates with a x1  assist   Pt is tolerating a renal diet, pt denies any nausea/vomiting  Pt has a left groin and left upper thigh incision, dressing are clean,dry and intact  Pt has left lateral ankle wound covered with clean,dry and intact dressing  Pulses noted with doppler   Fistula noted to left forearm, thrill and bruit present     Plan of care discussed, all questions answered. Explained importance of calling before getting OOB and pt verbalizes understanding. Explained importance of oral care. Call light is within reach, treaded slipper socks on, bed in lowest/ locked position, hourly rounding in place, all needs met at this time

## 2019-01-26 NOTE — PROGRESS NOTES
Inpatient Anticoagulation Service Note    Date: 1/25/2019  Reason for Anticoagulation: Other (Comments) (maintain bypass patency)        Target INR: 2.0 to 3.0    INR from last 7 days     Date/Time INR Value    01/25/19 1611 (!)  1.18        Dose from last 7 days     Date/Time Dose (mg)    01/25/19 1600  5        Average Dose (mg):  (new start this admission )  Significant Interactions: Not Applicable  Bridge Therapy: No (heparin DVT prophylaxis )     Comments: New start warfarin per Dr. Cason. Unclear duration. Fistula in place. Patient scheduled for fisulogram. Will start dosing per protocol and follow while in house. Currently no interacting medications, but home medications have not yet been re-ordered. Warfarin protocol in place.     Plan:  5 mg tonight, trend INR  Education Material Provided?: No (will need prior to discharge)  Pharmacist suggested discharge dosing: OLIVIER Del Real, PHARMD

## 2019-01-26 NOTE — PROGRESS NOTES
Inpatient Anticoagulation Service Note    Date: 1/26/2019  Reason for Anticoagulation: Other (Comments) (maintain bypass patency)        Hemoglobin Value: (!) 10.6  Hematocrit Value: (!) 33.9  Lab Platelet Value: 185  Target INR: 2.0 to 3.0    INR from last 7 days     Date/Time INR Value    01/26/19 0442 (!)  1.19    01/25/19 1611 (!)  1.18        Dose from last 7 days     Date/Time Dose (mg)    01/26/19 1300  5    01/25/19 1600  5        Average Dose (mg):  (new start this admission )  Significant Interactions: Other (Comments) (heparin dvt prop)  Bridge Therapy: No (heparin DVT prophylaxis )     Comments: Day 2 of new start warfarin to maintain bypass patency per MD Cason.  No update on duration of therapy.  No s/sx of bleeding.  Heparin VTE prophylaxis continued.  Continue to monitor for further plan details.    Plan:  Warfarin 5mg.  Education Material Provided?: No (will need prior to discharge)  Pharmacist suggested discharge dosing: Monitor plan for continued warfarin need s/p angioplasty.     Tarik Mancini

## 2019-01-26 NOTE — PROGRESS NOTES
2 RN skin check performed. LUE fistula. LUE groin inicison, thigh incision, and ankle incision. No other remarkable skin findings.

## 2019-01-26 NOTE — PROGRESS NOTES
Report received from day RN, assumed Care.   Patient is AOx4, responds appropriately.      Pain controlled at this time with PCA pump.  Patient is tolerating renal diet, denies nausea/vomiting. + flatus, - void (anuric)  Ambulates with x1 assist.    L groin incision, L thigh incision, L ankle incision. Dressings CDI.    Plan of care discussed, all questions answered.    Explained importance of calling before getting OOB and pt verbalizes understanding.    Call light and belongings within reach, treaded slipper socks on, bed in lowest locked position.  Hourly rounding in place, all needs met at this time

## 2019-01-26 NOTE — PROGRESS NOTES
Dr. Cason pageed for orders and updated Dr. Peters returned MD pito updated that pt's blood pressure was 191/75 at 1200, pt given 10 mg hydralazine at 1252, upon recheck pt's blood pressure found to be 162/68. Per Dr. Peters, resume pt's home medications labetalol 300 mg twice a day and lisinopril 40 MG twice a day. MD also updated that pt was suppose to get dialysis yesterday and did not, per MD she will contact pt's nephrologist.

## 2019-01-27 VITALS
RESPIRATION RATE: 19 BRPM | SYSTOLIC BLOOD PRESSURE: 143 MMHG | OXYGEN SATURATION: 95 % | BODY MASS INDEX: 24.93 KG/M2 | TEMPERATURE: 97.1 F | DIASTOLIC BLOOD PRESSURE: 56 MMHG | HEART RATE: 66 BPM | HEIGHT: 68 IN | WEIGHT: 164.46 LBS

## 2019-01-27 LAB
INR PPP: 1.37 (ref 0.87–1.13)
PROTHROMBIN TIME: 17 SEC (ref 12–14.6)

## 2019-01-27 PROCEDURE — A9270 NON-COVERED ITEM OR SERVICE: HCPCS | Performed by: SURGERY

## 2019-01-27 PROCEDURE — 700102 HCHG RX REV CODE 250 W/ 637 OVERRIDE(OP): Performed by: SURGERY

## 2019-01-27 PROCEDURE — 85610 PROTHROMBIN TIME: CPT

## 2019-01-27 PROCEDURE — 36415 COLL VENOUS BLD VENIPUNCTURE: CPT

## 2019-01-27 PROCEDURE — 97165 OT EVAL LOW COMPLEX 30 MIN: CPT

## 2019-01-27 PROCEDURE — 99232 SBSQ HOSP IP/OBS MODERATE 35: CPT | Performed by: INTERNAL MEDICINE

## 2019-01-27 PROCEDURE — 700111 HCHG RX REV CODE 636 W/ 250 OVERRIDE (IP): Performed by: SURGERY

## 2019-01-27 RX ORDER — OXYCODONE HYDROCHLORIDE AND ACETAMINOPHEN 5; 325 MG/1; MG/1
1-2 TABLET ORAL EVERY 4 HOURS PRN
Qty: 40 TAB | Refills: 0 | Status: SHIPPED | OUTPATIENT
Start: 2019-01-27 | End: 2019-02-16

## 2019-01-27 RX ADMIN — HEPARIN SODIUM 5000 UNITS: 5000 INJECTION, SOLUTION INTRAVENOUS; SUBCUTANEOUS at 13:57

## 2019-01-27 RX ADMIN — LISINOPRIL 40 MG: 20 TABLET ORAL at 05:14

## 2019-01-27 RX ADMIN — LABETALOL HCL 300 MG: 300 TABLET, FILM COATED ORAL at 05:14

## 2019-01-27 RX ADMIN — ACETAMINOPHEN 1000 MG: 500 TABLET ORAL at 13:05

## 2019-01-27 RX ADMIN — HEPARIN SODIUM 5000 UNITS: 5000 INJECTION, SOLUTION INTRAVENOUS; SUBCUTANEOUS at 05:14

## 2019-01-27 RX ADMIN — TRAZODONE HYDROCHLORIDE 150 MG: 50 TABLET ORAL at 01:48

## 2019-01-27 RX ADMIN — ACETAMINOPHEN 1000 MG: 500 TABLET ORAL at 05:14

## 2019-01-27 RX ADMIN — HYDRALAZINE HYDROCHLORIDE 10 MG: 20 INJECTION INTRAMUSCULAR; INTRAVENOUS at 06:59

## 2019-01-27 ASSESSMENT — COGNITIVE AND FUNCTIONAL STATUS - GENERAL
HELP NEEDED FOR BATHING: A LOT
DRESSING REGULAR LOWER BODY CLOTHING: A LITTLE
SUGGESTED CMS G CODE MODIFIER DAILY ACTIVITY: CK
PERSONAL GROOMING: A LITTLE
DAILY ACTIVITIY SCORE: 19
TOILETING: A LITTLE

## 2019-01-27 ASSESSMENT — ENCOUNTER SYMPTOMS
SENSORY CHANGE: 0
HEMOPTYSIS: 0
WHEEZING: 0
DIZZINESS: 0
FOCAL WEAKNESS: 0
SHORTNESS OF BREATH: 0
NECK PAIN: 0
HEADACHES: 0
FEVER: 0
VOMITING: 0
PALPITATIONS: 0
NAUSEA: 0
CHILLS: 0
MYALGIAS: 0
COUGH: 0
ORTHOPNEA: 0
BACK PAIN: 1

## 2019-01-27 ASSESSMENT — ACTIVITIES OF DAILY LIVING (ADL): TOILETING: INDEPENDENT

## 2019-01-27 NOTE — PROGRESS NOTES
Hemodialysis ordered by Dr. Guerra. Treatment started at 1840 and ended at 2140. Pt stable, vss, no c/o post tx. See flow sheets for details. Net UF 1.0 L. Report to NEHA Smith RN. Lt fa avf dressing clean, dry, intact.

## 2019-01-27 NOTE — DISCHARGE SUMMARY
"Discharge Summary    CHIEF COMPLAINT ON ADMISSION  Left foot rest pain and non-healing ulcer    Reason for Admission  ATHERSCLEROSIS    Peripheral vascular disease    Admission Date  1/25/2019    CODE STATUS  Full Code    HPI & HOSPITAL COURSE  This is a 78 y.o. male here with a left SFA occlusion.  He has a non-healing wound on the lateral left ankle.  He underwent surgery for improved perfusion on 1/25/2019 and has had an unremarkable post-operative course.  He has been able to walk with a walker and is anxious for discharge.      Therefore, he is discharged in good and stable condition to home with close outpatient follow-up.    The patient met 2-midnight criteria for an inpatient stay at the time of discharge.    Discharge Date  1/27/2019    FOLLOW UP ITEMS POST DISCHARGE  Will need follow-up for left lateral ankle wound.    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   • Basal cell carcinoma of left cheek 3/26/2015   • BPH 7/14/2009   • Bronchitis 12/25/2018   • CAD (coronary artery disease) 2/20/2014   • CATARACT     sanjuanita surgery complete   • Chronic respiratory failure with hypoxia ([facility]) 5/8/2016   • CKD (chronic kidney disease) stage 4, GFR 15-29 ml/min ([facility]) 1/15/2010    end stage renal disease   • COPD (chronic obstructive pulmonary disease) ([facility])    • COPD (chronic obstructive pulmonary disease) ([facility]) 8/2/2011   • Detached retina    • Dialysis     m,w,f [facility]   • EMPHYSEMA    • Gout    • Heart burn     occas   • High cholesterol    • HTN (hypertension) 1/15/2010   • Indigestion     occas   • Kidney cyst    • Leg pain, bilateral 8/10/2015   • On supplemental oxygen therapy    • Peripheral vascular disease (HCC) 8/10/2015   • Pneumonia    • Primary insomnia 3/22/2018   • Proteinuria    • Sleep apnea     O2 PER CANULA  AT NIGHT 2l/m   • Snoring        FOLLOW UP  Future " Appointments  Date Time Provider Department Center   3/7/2019 1:20 PM Martha Light M.D. [facility]     Follow-up in our office within 2 weeks.  If he doesn't have an appointment, should call our office tomorrow for appointment    MEDICATIONS ON DISCHARGE     Medication List      START taking these medications      Instructions   oxyCODONE-acetaminophen 5-325 MG Tabs  Commonly known as:  PERCOCET   Take 1-2 Tabs by mouth every four hours as needed for up to 20 days.  Dose:  1-2 Tab        CONTINUE taking these medications      Instructions   budesonide-formoterol 160-4.5 MCG/ACT Aero  Commonly known as:  SYMBICORT   Inhale 2 Puffs by mouth 2 Times a Day.  Dose:  2 Puff     Calcium Acetate (Phos Binder) 667 MG Caps   TAKE 3 CAPSULES BY MOUTH WITH MEALS  AND 2 CAPSULES BY MOUTH WITH SNACKS (2 SNACKS)     clopidogrel 75 MG Tabs  Commonly known as:  PLAVIX   TAKE ONE TABLET BY MOUTH DAILY     doxazosin 4 MG Tabs  Commonly known as:  CARDURA   Take 4 mg by mouth every evening.  Dose:  4 mg     fluticasone 50 MCG/ACT nasal spray  Commonly known as:  FLONASE   Spray 2 Sprays in nose every day.  Dose:  2 Spray     labetalol 300 MG Tabs  Commonly known as:  NORMODYNE   TAKE TWO TABLETS BY MOUTH TWICE A DAY (NORMODYNE)     lisinopril 40 MG tablet  Commonly known as:  PRINIVIL, ZESTRIL   TAKE ONE TABLET BY MOUTH TWICE A DAY     omeprazole 40 MG delayed-release capsule  Commonly known as:  PRILOSEC   TAKE ONE CAPSULE BY MOUTH DAILY     pravastatin 20 MG Tabs  Commonly known as:  PRAVACHOL   Take 20 mg by mouth every evening.  Dose:  20 mg     ROPINIRole 0.5 MG Tabs  Commonly known as:  REQUIP   Take 1 mg by mouth every bedtime.  Dose:  1 mg     traZODone 150 MG Tabs  Commonly known as:  DESYREL   TAKE TWO TABLETS BY MOUTH EVERY NIGHT AT BEDTIME *DESYREL*     VITAMIN D PO   Take 1 Tab by mouth every day.  Dose:  1 Tab     zolpidem 10 MG Tabs  Commonly known as:  AMBIEN   Take 1 Tab by mouth every bedtime for 90  days.  Dose:  10 mg            Allergies  No Known Allergies    DIET  Orders Placed This Encounter   Procedures   • Diet Order Regular, Renal     Standing Status:   Standing     Number of Occurrences:   1     Order Specific Question:   Diet:     Answer:   Regular [1]     Order Specific Question:   Diet:     Answer:   Renal [8]       ACTIVITY  As tolerated.  Weight bearing as tolerated    PROCEDURES  Left fem-pop bypass with 6mm Propaten graft.    LABORATORY  Lab Results   Component Value Date    SODIUM 136 01/26/2019    POTASSIUM 4.9 01/26/2019    CHLORIDE 98 01/26/2019    CO2 23 01/26/2019    GLUCOSE 94 01/26/2019    BUN 51 (H) 01/26/2019    CREATININE 10.09 (HH) 01/26/2019    CREATININE 2.1 (H) 05/06/2009        Lab Results   Component Value Date    WBC 5.9 01/26/2019    HEMOGLOBIN 10.6 (L) 01/26/2019    HEMATOCRIT 33.9 (L) 01/26/2019    PLATELETCT 185 01/26/2019

## 2019-01-27 NOTE — PROGRESS NOTES
Inpatient Anticoagulation Service Note    Date: 2019  Reason for Anticoagulation: Other (Comments) (maintain bypass patency)        Hemoglobin Value: (!) 10.6  Hematocrit Value: (!) 33.9  Lab Platelet Value: 185  Target INR: 2.0 to 3.0    INR from last 7 days     Date/Time INR Value    19 0336 (!)  1.37    19 0442 (!)  1.19    19 1611 (!)  1.18        Dose from last 7 days     Date/Time Dose (mg)    19 0900  5    19 1300  5    19 1600  5        Average Dose (mg):  (new start this admission )  Significant Interactions: Other (Comments) (heparin dvt prop)  Bridge Therapy: No (heparin DVT prophylaxis )     Comments: Dosing with warfarin continues. No overt S/S bleeding noted. INR is starting to trend up appropriately. Unclear duration. Continue 5 mg daily and continue to follow.    Plan:  5 mg daily  Education Material Provided?: No (will need prior to discharge)  Pharmacist suggested discharge dosin mg daily, INR within 72 hours of discharge     Dory Del Real, PHARMD

## 2019-01-27 NOTE — PROGRESS NOTES
Patient is AOx4, responds appropriately.      Pain controlled at this time.  Patient is tolerating renal diet, denies nausea/vomiting. + flatus,   Ambulates x1 assist    L groin incision, L thigh incision, L ankle incision. Dressings CDI. Pedal and posterior tibial pulses auscultated by doppler.    Plan of care discussed, all questions answered.    Explained importance of calling before getting OOB and pt verbalizes understanding.    Call light and belongings within reach, treaded slipper socks on,  bed in lowest locked position.  Hourly rounding in place, all needs met at this time

## 2019-01-27 NOTE — PROGRESS NOTES
Per pharmacy vasotec dose not appropriate for this floor, pharmacist to call Dr. Peters for new orders.

## 2019-01-27 NOTE — CARE PLAN
Problem: Safety  Goal: Will remain free from injury  Pt up with one person assist and FWW. Call light within reach. Calls for assistance as needed.

## 2019-01-27 NOTE — DISCHARGE INSTRUCTIONS
Discharge Instructions    Discharged to home by car with relative. Discharged via wheelchair, hospital escort: Yes.  Special equipment needed: Walker    Be sure to schedule a follow-up appointment with your primary care doctor or any specialists as instructed.     Discharge Plan:   Diet Plan: Discussed  Activity Level: Discussed  Confirmed Follow up Appointment: Patient to Call and Schedule Appointment  Confirmed Symptoms Management: Discussed  Medication Reconciliation Updated: Yes  Influenza Vaccine Indication: Not indicated: Previously immunized this influenza season and > 8 years of age    I understand that a diet low in cholesterol, fat, and sodium is recommended for good health. Unless I have been given specific instructions below for another diet, I accept this instruction as my diet prescription.   Other diet: diet as tolerated    Special Instructions: None    · Is patient discharged on Warfarin / Coumadin?   No     Depression / Suicide Risk    As you are discharged from this [facility] facility, it is important to learn how to keep safe from harming yourself.    Recognize the warning signs:  · Abrupt changes in personality, positive or negative- including increase in energy   · Giving away possessions  · Change in eating patterns- significant weight changes-  positive or negative  · Change in sleeping patterns- unable to sleep or sleeping all the time   · Unwillingness or inability to communicate  · Depression  · Unusual sadness, discouragement and loneliness  · Talk of wanting to die  · Neglect of personal appearance   · Rebelliousness- reckless behavior  · Withdrawal from people/activities they love  · Confusion- inability to concentrate     If you or a loved one observes any of these behaviors or has concerns about self-harm, here's what you can do:  · Talk about it- your feelings and reasons for harming yourself  · Remove any means that you might use to hurt yourself (examples: pills, rope, extension  cords, firearm)  · Get professional help from the community (Mental Health, Substance Abuse, psychological counseling)  · Do not be alone:Call your Safe Contact- someone whom you trust who will be there for you.  · Call your local CRISIS HOTLINE 601-4014 or 378-552-7772  · Call your local Children's Mobile Crisis Response Team [place] (717) 433-5006 or www.Nimaya  · Call the toll free National Suicide Prevention Hotlines   · National Suicide Prevention Lifeline 703-324-FGUE (7267)  · [facility] Hope [facility] Network 800-SUICIDE (966-4289)    Femoral Popliteal Bypass, Care After  Refer to this sheet in the next few weeks. These instructions provide you with information on caring for yourself after your procedure. Your health care provider may also give you more specific instructions. Your treatment has been planned according to current medical practices, but problems sometimes occur. Call your health care provider if you have any problems or questions after your procedure.  HOME CARE INSTRUCTIONS   · Take medicines as told by your health care provider.  · Clean your incision site as told by your health care provider.  · Keep the area around your incision dry unless told by your health care provider. Ask your health care provider when it is okay to shower. Do not take a bath, swim, or soak in a hot tub until your incision has completely healed.  · Keep the area around the incision clean. This will help decrease your risk of infection. Check your incision site every day. Let your health care provider know if you see any swelling, redness, or anything leaking from the incision.  · Exercise as told by your health care provider.  · Avoid strenuous physical activity for the first few weeks. This is anything that requires great effort or energy.  · Raise (elevate) your legs when sitting.  · Ask your health care provider when you can return to work. The type of work you do will make a difference.  · Do not drive  while taking pain medicines. Ask your health care provider when it is safe to start driving.  · Take all medicines as directed by your health care provider.  · Keep all your follow-up appointments with your health care provider.  · For long-term success:  ¨ Control your blood pressure.  ¨ Take prescription medicines as told by your health care provider.  ¨ Manage diabetes, if you have it.  ¨ Do not smoke.  ¨ Eat healthy foods. Ask your health care provider for suggestions, or talk with a dietitian.  ¨ Get regular exercise. Talk with your health care provider before starting any new physical activity.  SEEK MEDICAL CARE IF:   · You have redness and swelling around your incision site.  · You continue to have pain in your leg, even after taking pain medicine.  · You have any questions about your medicines.  · You have nausea or vomiting.  · You have abnormal bowel habits such as having a difficult time having a bowel movement (constipation) or having loose stools (diarrhea).  · You feel weak and tired.  · You are too tired to walk every day.  SEEK IMMEDIATE MEDICAL CARE IF:   · Your leg becomes pale, cold, blue, tingly, or has no feeling.  · You have yellow or tan fluid coming from your incision site.  · You have bleeding from the incision site.  · You have severe pain in your leg that does not go away, even after you have taken pain medicine.  · You have trouble breathing.  · You have chest pain.  · You have a fever.  MAKE SURE YOU:  · Understand these instructions.  · Will watch your condition.  · Will get help right away if you are not doing well or get worse.     This information is not intended to replace advice given to you by your health care provider. Make sure you discuss any questions you have with your health care provider.     Document Released: 10/15/2010 Document Revised: 12/23/2014 Document Reviewed: 10/15/2010  [employer] Interactive Patient Education ©2016 [employer]    Femoral Endarterectomy, Care  After  Refer to this sheet in the next few weeks. These discharge instructions provide you with general information on caring for yourself after you leave the hospital. Your caregiver may also give you specific instructions. Your treatment has been planned according to the most current medical practices available, but unavoidable complications sometimes occur. If you have any problems or questions after discharge, please call your caregiver.  HOME CARE INSTRUCTIONS   · Medicine.  ¨ Some pain is normal after this type of surgery. Take the pain medicine that was prescribed. Follow the directions carefully. Do not take over-the-counter pain medicine unless your caregiver says it is okay. Some of them can cause bleeding problems after surgery.  ¨ You might need to take a blood thinner (anticoagulant). This keeps blood clots from forming. Follow the directions carefully.  · Wound care.  ¨ Keep the bandage (dressing) on your surgical cut (incision) dry for a few days after surgery. Once the dressing can be taken off, it will be okay for you to shower. Do not take a tub bath for at least 2 weeks after surgery.  ¨ You will need to return to have your stitches (sutures) or staples removed. This is usually done about a week after your surgery.  · Activity.  ¨ It is important to walk as much as possible. This helps keep blood clots from forming in your legs.  ¨ Ask your caregiver before going up or down steps. Even after your caregiver says it is okay to use stairs, you may need help with steps for awhile.  ¨ Do not sit or stand for long periods of time. When you do sit, keep your legs raised. Put your feet on a stool or lie on the couch.  ¨ Do not lift anything heavy for several weeks.  ¨ Do not bend or strain for several weeks.  ¨ Do not drive until your caregiver says it is okay. Do not drive while taking narcotic pain medicine.  ¨ Ask your caregiver when you can go back to work. This will depend on the type of work you  do.  ¨ Go back to your normal routine slowly. Give your body time to heal. Ask your caregiver if you have questions about any particular activity.  · Lifestyle.  ¨ You might need to change some habits to make sure your arteries stay clear.  ¨ Talk with a nutritionist about what you eat. You may need to eat less of some types of food. Good foods are those low in saturated fats. Vegetables, fruits, and whole grains are good for you.  ¨ Think about exercising more. Check with your caregiver before starting a new exercise plan.  ¨ Keep your weight under control.  ¨ Do not smoke.  SEEK MEDICAL CARE IF:   · You have any questions about medicines.  · Pain continues, even after taking pain medicine.  · You have pain or cramps in your leg while walking.  · You have an oral temperature above 102° F (38.9° C).  SEEK IMMEDIATE MEDICAL CARE IF:   · Pain gets much worse.  · You have shortness of breath.  · You have chest pain.  · The area around the incision becomes red and swells.  · Blood or other liquid leaks from the incision.  · Your leg becomes red, swollen, or sore.  · Your leg or foot changes color or becomes numb.  · You have an oral temperature above 102° F (38.9° C), not controlled by medicine.  MAKE SURE YOU:   · Understand these instructions.  · Will watch your condition.  · Will get help right away if you are not doing well or get worse.     This information is not intended to replace advice given to you by your health care provider. Make sure you discuss any questions you have with your health care provider.     Document Released: 03/14/2011 Document Revised: 10/08/2014 Document Reviewed: 03/14/2011  [employer] Interactive Patient Education ©2016 [employer]

## 2019-01-27 NOTE — PROGRESS NOTES
Nephrology Daily Progress Note    Date of Service  1/27/2019    Chief Complaint  78 y.o. Male with ESRD/HD admitted 1/25/2019 for LLE fem-pop bypass    Interval Problem Update  Doing well  No complaints except left leg pain  Completed dialysis last night --tolerated well    Review of Systems  Review of Systems   Constitutional: Positive for malaise/fatigue. Negative for chills and fever.   HENT: Negative.    Respiratory: Negative for cough, hemoptysis, shortness of breath and wheezing.    Cardiovascular: Negative for chest pain, palpitations and orthopnea.   Gastrointestinal: Negative for nausea and vomiting.   Genitourinary: Negative for dysuria.   Musculoskeletal: Positive for back pain and joint pain. Negative for myalgias and neck pain.   Skin: Negative.    Neurological: Negative for dizziness, sensory change, focal weakness and headaches.        Physical Exam  Temp:  [35.6 °C (96 °F)-36.8 °C (98.2 °F)] 36.7 °C (98 °F)  Pulse:  [69-85] 75  Resp:  [15-18] 17  BP: (129-191)/(65-89) 165/70  SpO2:  [93 %-98 %] 93 %    Physical Exam   Constitutional: He is oriented to person, place, and time. He appears well-developed and well-nourished. No distress.   HENT:   Head: Normocephalic and atraumatic.   Mouth/Throat: Oropharynx is clear and moist.   Eyes: Pupils are equal, round, and reactive to light. Conjunctivae and EOM are normal.   Neck: Normal range of motion. Neck supple. No thyromegaly present.   Cardiovascular: Normal rate and regular rhythm.  Exam reveals no gallop and no friction rub.    Pulmonary/Chest: Effort normal and breath sounds normal. No respiratory distress. He has no wheezes. He has no rales.   Abdominal: Soft. Bowel sounds are normal. He exhibits no distension. There is no tenderness.   Musculoskeletal: He exhibits edema and tenderness.   LLE   Neurological: He is alert and oriented to person, place, and time. No cranial nerve deficit.   Skin: Skin is warm. No rash noted. No erythema.   Nursing note  and vitals reviewed.      Fluids    Intake/Output Summary (Last 24 hours) at 01/27/19 0751  Last data filed at 01/27/19 0647   Gross per 24 hour   Intake           1791.7 ml   Output             1500 ml   Net            291.7 ml       Laboratory  Recent Labs      01/26/19   0441   WBC  5.9   RBC  3.32*   HEMOGLOBIN  10.6*   HEMATOCRIT  33.9*   MCV  102.1*   MCH  31.9   MCHC  31.3*   RDW  57.4*   PLATELETCT  185   MPV  10.4     Recent Labs      01/26/19   0441   SODIUM  136   POTASSIUM  4.9   CHLORIDE  98   CO2  23   GLUCOSE  94   BUN  51*   CREATININE  10.09*   CALCIUM  10.2     Recent Labs      01/25/19   1611  01/26/19   0442  01/27/19   0336   INR  1.18*  1.19*  1.37*                 Assessment/Plan  1.ESRD/HD -MWF  2.HTN: elevated BP -to monitor -increase Lisinopril to 40 mg po BID if no improvement  3.Electrolytes: well controlled.  4.Anemia: Hb at goal    Recs; close BP monitoring              HD MWF             All meds to renal doses             Renal diet

## 2019-01-27 NOTE — PROGRESS NOTES
"Pt AA&Ox4. Pain rating at 7 and controlled with pca. Sitting up in bed this am. No c/o n/v, or SOB. Pt on RA. +flatus, -BM. Left groin, left thigh, left ankle incision CDI. Pedal and posterior tibial pulses heard by doppler. Pt tolerating regular diet. Pt encouraged to ambulate today. Plan of care discussed. Hourly rounding in place. Blood pressure 123/73, pulse 73, temperature 36.5 °C (97.7 °F), temperature source Temporal, resp. rate 18, height 1.727 m (5' 8\"), weight 74.6 kg (164 lb 7.4 oz), SpO2 92 %.       "

## 2019-01-27 NOTE — PROGRESS NOTES
Per pharmacist Vasotec dose changed to 2.5 mg Q4H PRN, per pharmacy give dose of Vasotec and if pt still remains hypertensive okay to give clonidine. Will give Vasotec and monitor pt.

## 2019-01-27 NOTE — CONSULTS
DATE OF SERVICE:  01/26/2019    NEPHROLOGY CONSULT    REQUESTING PHYSICIAN:  David Cason MD    REASON FOR CONSULTATION:  To evaluate and provide dialysis for patient with   end-stage renal disease.    HISTORY OF PRESENT ILLNESS:  The patient is a 78-year-old male with end-stage   renal disease, on hemodialysis, has been receiving his dialysis treatments on   Monday, Wednesday, and Friday, last dialysis completed on Wednesday.  Admitted   to the hospital as scheduled for left lower extremity femoral popliteal   bypass, completed surgery yesterday, doing well, no complaints except left   lower extremity pain.  He is scheduled for dialysis today.    REVIEW OF SYSTEMS:  GENERAL:  Positive for fatigue.  No fever or chills.  HEENT:  Normal rate.  No sore throat.  No sinus pain or congestion.  NECK:  No pain, no stiffness.  RESPIRATORY:  No cough, no hemoptysis.  No shortness of breath.  CARDIOVASCULAR:  No chest pain, no palpitations, no dyspnea.  GASTROINTESTINAL:  No abdominal pain, nausea, vomiting or diarrhea.    All other systems reviewed, all negative.    PAST MEDICAL HISTORY:  End-stage renal disease, on hemodialysis; coronary   artery disease; peripheral vascular disease; chronic obstructive pulmonary   disease; hypertension; benign prostatic hypertrophy; cataracts; indigestion;   sleep apnea.    PAST SURGICAL HISTORY:  Colonoscopy and AV fistula creation, eye surgery, AV   fistula creation and AV fistula angioplasty, fistulogram and recent left lower   extremity femoral popliteal bypass.    FAMILY HISTORY:  Positive for stroke, hypertension, Lyme disease.    SOCIAL HISTORY:  Quit tobacco in 2009.  No alcohol, drug use.    ALLERGIES:  No known drug allergies.    OUTPATIENT MEDICATIONS:  Reviewed in the chart.    PHYSICAL EXAMINATION:  VITAL SIGNS:  Blood pressure 170/80, heart rate 69, temperature 36.4 Celsius.  GENERAL APPEARANCE:  Well-developed, well-nourished male in no acute distress.  HEENT:   Normocephalic, atraumatic.  Pupils equal, round, reactive to light.    Extraocular movements intact.  Nares patent.  Oropharynx clear with mucosa.    No erythema or exudates.  NECK:  Supple.  No lymphadenopathy.  No thyromegaly appreciated.  LUNGS:  Clear to auscultation bilaterally.  No rales, wheezes, or rhonchi.  HEART:  Regular rate.  No rub or gallop.  ABDOMEN:  Soft, nontender, and nondistended.  Bowel sounds present.  No   palpable masses.  EXTREMITIES:  Left lower extremity, 1+ edema dressing.  Incision clear.  No   bleeding, right lower extremity, no cyanosis, no clubbing, no edema.  NEUROLOGIC:  Alert and oriented x3, no focal deficits.    LABORATORY RESULTS:  Hemoglobin 10.6.  Sodium 136, potassium 4.9, BUN 51 and   creatinine level 10.0.    ASSESSMENT AND PLAN:  The patient is a 78-year-old male with end-stage renal   disease, on hemodialysis, status post femoral popliteal bypass.  1.  End-stage renal disease.  We will dialyze patient today, then per   patient's schedule Monday, Wednesday, Friday.  2.  Electrolytes remain well controlled.  3.  Hypertension.  Blood pressure elevated.  We will attempt ultrafiltration   with dialysis to hold blood pressure control.  4.  Anemia.  Hemoglobin level at goal to monitor.    RECOMMENDATIONS:  1.  Hemodialysis today, then Monday, Wednesday, Friday and to monitor basic   metabolic panel to provide renal diet.  We will follow up patient closely.    Thank you for the consult.       ____________________________________     MD RYLEE VIRAMONTES / DELANO    DD:  01/26/2019 17:24:03  DT:  01/26/2019 20:32:51    D#:  [id number]  Job#:  [id number]

## 2019-01-27 NOTE — CARE PLAN
Problem: Pain Management  Goal: Pain level will decrease to patient's comfort goal  Pain controlled with pca at this time.

## 2019-01-27 NOTE — PROGRESS NOTES
Dr. Cason on floor and updated on pt's on going hypertension. Medications pt received reviewed with Dr. Cason, and pt's current blood pressure of 129/72. Dr. Cason assessed pt. Pt off floor to dialysis after assessment.

## 2019-01-27 NOTE — PROGRESS NOTES
POD#2 s/p Left fem-pop bypass      VSS afeb    Dressings dry and intact.  Doppler signals strong and bypass patent.  Both incisions look great.     Impression:  Patient very anxious to go home.  Does not want to wait for another dialysis treatment tomorrow and would prefer to discharge today with outpatient follow-up at  [facility] tomorrow as is his usual habit.     Plan:   Discharge,  Follow-up in our office within 2 weeks.  We can assess healing of lateral ankle ulcer at that time.

## 2019-01-27 NOTE — PROGRESS NOTES
Dr. Peters paged and updated that pt's blood pressure is 177/80, despite home medication being given from previous order, per MD give Vasotec IV 5 MG PRN Q2H SBP >160, DBP >90

## 2019-01-27 NOTE — OP REPORT
DATE OF SERVICE:  01/25/2019    PREOPERATIVE DIAGNOSES:  1.  Stage V kidney disease with left forearm AV fistula.  2.  Left lower extremity ischemia secondary to femoral artery occlusion.  3.  Hypertension.    POSTOPERATIVE DIAGNOSES:  1.  Stage V kidney disease with left forearm AV fistula.  2.  Left lower extremity ischemia secondary to femoral artery occlusion.  3.  Hypertension.    OPERATIONS:  1.  Left femoral endarterectomy with left femoral to above knee popliteal in   situ bypass.  2.  Left popliteal endarterectomy.    SURGEONS:  David Cason MD and Sera Peters MD    ANESTHESIA:  General anesthesia.    ANESTHESIOLOGIST:  Dhruv Johnson MD    DESCRIPTION OF PROCEDURE:  After obtaining informed consent, the patient was   positioned supine.  He was put under general anesthesia.  A timeout was   performed.  ChloraPrep was used to prep his pelvis, his left lower extremity   and foot.  He was draped sterilely.  Dr. Peters explored the above knee   popliteal artery while I explored the groin through surgical incisions.  The   arteries were densely calcified and very hard with very thin adventitia.  I   progressively dissected out the common femoral and profunda and proximal   femoral arteries on the left.  I removed large pieces of calcified plaque.  I   sutured the vein graft end-to-side to the common femoral artery.  I used 6-0   Prolene for a long anastomosis.  Dr. Peters passed the LeMaitre valvulotome   from the saphenous vein exposure just below the knee.  We did several passes   to break the valves and we were able to get fairly good flow through the   graft.  Dr. Peters did an anastomosis to the popliteal artery where there   was plaque.  I did injection of contrast through the vein graft before we   completed the proximal anastomosis and this showed that the distal anastomosis   was not flowing well due to the heavy plaque load.  She took down the   anastomosis and did further  endarterectomy of the popliteal artery above the   knee and then redid the anastomosis.  The subsequent angiogram was very   satisfactory with a good distal anastomosis.  We ligated side branches where   we could identify them with ultrasound.  There was one in the proximal thigh   region that I tied down securely and no other branches were observed on the   angiogram.  The wounds were irrigated with saline.  Interrupted 3-0 Vicryl and   running 3-0 Vicryl were used in the closure.  Counts were reported to be   correct.  The patient tolerated the procedure well and was taken to recovery   room in stable condition.       ____________________________________     MD VILMA Fowler / DELANO    DD:  01/26/2019 18:43:04  DT:  01/26/2019 19:59:40    D#:  [id number]  Job#:  [id number]

## 2019-01-27 NOTE — OR SURGEON
Immediate Post OP Note    PreOp Diagnosis: Left femoral occlusion with ankle ulceration.    PostOp Diagnosis: Same    Procedure(s):  LEFT FEMORAL POPLITEAL POLYTETRAFLUOROETHYLENE ePTFE(PROPATEN VASCULAR GRAFT) BYPASS - Wound Class: Clean  FEMORAL ENDARTERECTOMY - Wound Class: Clean  LEFT LEG ANGIOGRAM - Wound Class: Clean    Surgeon(s):  ROLDAN Valencia M.D. Janet L Albright, M.D.    Anesthesiologist/Type of Anesthesia:  Anesthesiologist: Dhruv Johnson M.D./General    Surgical Staff:  Circulator: Rebecca Morocho R.N.  Relief Circulator: Leo Fernandez R.N.; Benson Johnson R.N.  Relief Scrub: Annabella Engle; Katina Butcher  Scrub Person: Jerry Pulido    Specimens removed if any:  ID Type Source Tests Collected by Time Destination   A : PLAQUE Other Other PATHOLOGY SPECIMEN David Cason M.D. 1/25/2019 10:17 AM    B : more plaque Other Other PATHOLOGY SPECIMEN David Cason M.D. 1/25/2019 12:11 PM        Estimated Blood Loss: 75    Findings: severe calcified plaque    Complications: one apparent.        1/26/2019 6:34 PM David Cason M.D.

## 2019-01-28 ENCOUNTER — PATIENT OUTREACH (OUTPATIENT)
Dept: HEALTH INFORMATION MANAGEMENT | Facility: OTHER | Age: 79
End: 2019-01-28

## 2019-02-21 ENCOUNTER — HOSPITAL ENCOUNTER (OUTPATIENT)
Facility: MEDICAL CENTER | Age: 79
End: 2019-02-21
Attending: SURGERY | Admitting: SURGERY
Payer: MEDICARE

## 2019-02-21 VITALS
BODY MASS INDEX: 23.49 KG/M2 | DIASTOLIC BLOOD PRESSURE: 63 MMHG | SYSTOLIC BLOOD PRESSURE: 154 MMHG | TEMPERATURE: 97.8 F | WEIGHT: 154.98 LBS | HEART RATE: 77 BPM | HEIGHT: 68 IN | RESPIRATION RATE: 16 BRPM | OXYGEN SATURATION: 93 %

## 2019-02-21 LAB
EKG IMPRESSION: NORMAL
HCT VFR BLD CALC: 17 % (ref 42–52)
HCT VFR BLD CALC: 33 % (ref 42–52)
HGB BLD-MCNC: 11.2 G/DL (ref 14–18)
HGB BLD-MCNC: 5.8 G/DL (ref 14–18)
POTASSIUM BLD-SCNC: 3.8 MMOL/L (ref 3.6–5.5)
POTASSIUM BLD-SCNC: 6.2 MMOL/L (ref 3.6–5.5)
POTASSIUM SERPL-SCNC: 3.9 MMOL/L (ref 3.6–5.5)
SODIUM BLD-SCNC: 139 MMOL/L (ref 135–145)
SODIUM BLD-SCNC: 141 MMOL/L (ref 135–145)

## 2019-02-21 PROCEDURE — 160048 HCHG OR STATISTICAL LEVEL 1-5: Performed by: SURGERY

## 2019-02-21 PROCEDURE — 85014 HEMATOCRIT: CPT | Mod: 91

## 2019-02-21 PROCEDURE — 700111 HCHG RX REV CODE 636 W/ 250 OVERRIDE (IP)

## 2019-02-21 PROCEDURE — 501838 HCHG SUTURE GENERAL: Performed by: SURGERY

## 2019-02-21 PROCEDURE — 93010 ELECTROCARDIOGRAM REPORT: CPT | Performed by: INTERNAL MEDICINE

## 2019-02-21 PROCEDURE — 160002 HCHG RECOVERY MINUTES (STAT): Performed by: SURGERY

## 2019-02-21 PROCEDURE — 160041 HCHG SURGERY MINUTES - EA ADDL 1 MIN LEVEL 4: Performed by: SURGERY

## 2019-02-21 PROCEDURE — 160029 HCHG SURGERY MINUTES - 1ST 30 MINS LEVEL 4: Performed by: SURGERY

## 2019-02-21 PROCEDURE — 700101 HCHG RX REV CODE 250

## 2019-02-21 PROCEDURE — 160036 HCHG PACU - EA ADDL 30 MINS PHASE I: Performed by: SURGERY

## 2019-02-21 PROCEDURE — 160035 HCHG PACU - 1ST 60 MINS PHASE I: Performed by: SURGERY

## 2019-02-21 PROCEDURE — 160025 RECOVERY II MINUTES (STATS): Performed by: SURGERY

## 2019-02-21 PROCEDURE — A6402 STERILE GAUZE <= 16 SQ IN: HCPCS | Performed by: SURGERY

## 2019-02-21 PROCEDURE — 160009 HCHG ANES TIME/MIN: Performed by: SURGERY

## 2019-02-21 PROCEDURE — 160022 HCHG BLOCK: Performed by: SURGERY

## 2019-02-21 PROCEDURE — 84132 ASSAY OF SERUM POTASSIUM: CPT

## 2019-02-21 PROCEDURE — 501837 HCHG SUTURE CV: Performed by: SURGERY

## 2019-02-21 PROCEDURE — A9270 NON-COVERED ITEM OR SERVICE: HCPCS | Performed by: ANESTHESIOLOGY

## 2019-02-21 PROCEDURE — 700102 HCHG RX REV CODE 250 W/ 637 OVERRIDE(OP): Performed by: ANESTHESIOLOGY

## 2019-02-21 PROCEDURE — 93005 ELECTROCARDIOGRAM TRACING: CPT | Performed by: SURGERY

## 2019-02-21 PROCEDURE — 500382 HCHG DRAIN, PENROSE 1X18: Performed by: SURGERY

## 2019-02-21 PROCEDURE — 160046 HCHG PACU - 1ST 60 MINS PHASE II: Performed by: SURGERY

## 2019-02-21 PROCEDURE — 84295 ASSAY OF SERUM SODIUM: CPT

## 2019-02-21 RX ORDER — ACETAMINOPHEN 500 MG
1000 TABLET ORAL ONCE
Status: COMPLETED | OUTPATIENT
Start: 2019-02-21 | End: 2019-02-21

## 2019-02-21 RX ORDER — GABAPENTIN 300 MG/1
600 CAPSULE ORAL ONCE
Status: COMPLETED | OUTPATIENT
Start: 2019-02-21 | End: 2019-02-21

## 2019-02-21 RX ORDER — MEPERIDINE HYDROCHLORIDE 25 MG/ML
12.5 INJECTION INTRAMUSCULAR; INTRAVENOUS; SUBCUTANEOUS
Status: DISCONTINUED | OUTPATIENT
Start: 2019-02-21 | End: 2019-02-21 | Stop reason: HOSPADM

## 2019-02-21 RX ORDER — OXYCODONE HCL 5 MG/5 ML
10 SOLUTION, ORAL ORAL
Status: DISCONTINUED | OUTPATIENT
Start: 2019-02-21 | End: 2019-02-21 | Stop reason: HOSPADM

## 2019-02-21 RX ORDER — KETOROLAC TROMETHAMINE 30 MG/ML
30 INJECTION, SOLUTION INTRAMUSCULAR; INTRAVENOUS EVERY 6 HOURS PRN
Status: DISCONTINUED | OUTPATIENT
Start: 2019-02-21 | End: 2019-02-21 | Stop reason: HOSPADM

## 2019-02-21 RX ORDER — FUROSEMIDE 80 MG
80 TABLET ORAL 2 TIMES DAILY
COMMUNITY
End: 2019-06-25

## 2019-02-21 RX ORDER — MORPHINE SULFATE 10 MG/ML
5 INJECTION, SOLUTION INTRAMUSCULAR; INTRAVENOUS
Status: DISCONTINUED | OUTPATIENT
Start: 2019-02-21 | End: 2019-02-21 | Stop reason: HOSPADM

## 2019-02-21 RX ORDER — SODIUM CHLORIDE 9 MG/ML
INJECTION, SOLUTION INTRAVENOUS ONCE
Status: COMPLETED | OUTPATIENT
Start: 2019-02-21 | End: 2019-02-21

## 2019-02-21 RX ORDER — SODIUM CHLORIDE, SODIUM LACTATE, POTASSIUM CHLORIDE, CALCIUM CHLORIDE 600; 310; 30; 20 MG/100ML; MG/100ML; MG/100ML; MG/100ML
INJECTION, SOLUTION INTRAVENOUS CONTINUOUS
Status: DISCONTINUED | OUTPATIENT
Start: 2019-02-21 | End: 2019-02-21 | Stop reason: HOSPADM

## 2019-02-21 RX ORDER — BUPIVACAINE HYDROCHLORIDE 5 MG/ML
INJECTION, SOLUTION PERINEURAL
Status: DISCONTINUED | OUTPATIENT
Start: 2019-02-21 | End: 2019-02-21 | Stop reason: HOSPADM

## 2019-02-21 RX ORDER — MORPHINE SULFATE 4 MG/ML
2 INJECTION, SOLUTION INTRAMUSCULAR; INTRAVENOUS
Status: DISCONTINUED | OUTPATIENT
Start: 2019-02-21 | End: 2019-02-21 | Stop reason: HOSPADM

## 2019-02-21 RX ORDER — OXYCODONE HCL 20 MG/1
20 TABLET, FILM COATED, EXTENDED RELEASE ORAL ONCE
Status: COMPLETED | OUTPATIENT
Start: 2019-02-21 | End: 2019-02-21

## 2019-02-21 RX ORDER — MIDAZOLAM HYDROCHLORIDE 1 MG/ML
1 INJECTION INTRAMUSCULAR; INTRAVENOUS
Status: DISCONTINUED | OUTPATIENT
Start: 2019-02-21 | End: 2019-02-21 | Stop reason: HOSPADM

## 2019-02-21 RX ORDER — HALOPERIDOL 5 MG/ML
1 INJECTION INTRAMUSCULAR
Status: DISCONTINUED | OUTPATIENT
Start: 2019-02-21 | End: 2019-02-21 | Stop reason: HOSPADM

## 2019-02-21 RX ORDER — LIDOCAINE HYDROCHLORIDE 10 MG/ML
INJECTION, SOLUTION INFILTRATION; PERINEURAL
Status: COMPLETED
Start: 2019-02-21 | End: 2019-02-21

## 2019-02-21 RX ORDER — HYDRALAZINE HYDROCHLORIDE 20 MG/ML
10 INJECTION INTRAMUSCULAR; INTRAVENOUS
Status: DISCONTINUED | OUTPATIENT
Start: 2019-02-21 | End: 2019-02-21 | Stop reason: HOSPADM

## 2019-02-21 RX ORDER — METOPROLOL TARTRATE 1 MG/ML
1 INJECTION, SOLUTION INTRAVENOUS
Status: DISCONTINUED | OUTPATIENT
Start: 2019-02-21 | End: 2019-02-21 | Stop reason: HOSPADM

## 2019-02-21 RX ORDER — DIPHENHYDRAMINE HYDROCHLORIDE 50 MG/ML
12.5 INJECTION INTRAMUSCULAR; INTRAVENOUS
Status: DISCONTINUED | OUTPATIENT
Start: 2019-02-21 | End: 2019-02-21 | Stop reason: HOSPADM

## 2019-02-21 RX ORDER — CELECOXIB 200 MG/1
400 CAPSULE ORAL ONCE
Status: COMPLETED | OUTPATIENT
Start: 2019-02-21 | End: 2019-02-21

## 2019-02-21 RX ADMIN — CELECOXIB 400 MG: 200 CAPSULE ORAL at 13:18

## 2019-02-21 RX ADMIN — GABAPENTIN 600 MG: 300 CAPSULE ORAL at 13:18

## 2019-02-21 RX ADMIN — Medication 0.5 ML: at 13:21

## 2019-02-21 RX ADMIN — SODIUM CHLORIDE: 9 INJECTION, SOLUTION INTRAVENOUS at 13:21

## 2019-02-21 RX ADMIN — ACETAMINOPHEN 1000 MG: 500 TABLET, FILM COATED ORAL at 13:18

## 2019-02-21 RX ADMIN — LIDOCAINE HYDROCHLORIDE 0.5 ML: 10 INJECTION, SOLUTION INFILTRATION; PERINEURAL at 13:21

## 2019-02-21 RX ADMIN — OXYCODONE HYDROCHLORIDE 20 MG: 20 TABLET, FILM COATED, EXTENDED RELEASE ORAL at 13:18

## 2019-02-21 NOTE — OR SURGEON
Immediate Post OP Note    PreOp Diagnosis: Stage 5 renal disease.     PostOp Diagnosis: Same    Procedure(s):  RIGHT DISTAL RADIAL CEPHALIC AV FISTULA CREATION - Wound Class: Clean    Surgeon(s):  David Cason M.D.    Anesthesiologist/Type of Anesthesia:   Anesthesiologist: Bryon Cee M.D./General    Surgical Staff:  Circulator: Nohemy Sorto R.N.  Relief Scrub: Annabella TalleyShahriar  Scrub Person: Crista Sandoval; Angel Valenzuela    Specimens removed if any:  * No specimens in log *    Estimated Blood Loss: < 10 mls    Findings: medium size vein large calcified radial artery. Good thrill at completion.    Complications: None apparent        2/21/2019 3:58 PM David Cason M.D.

## 2019-02-22 NOTE — DISCHARGE INSTRUCTIONS
"  ACTIVITY: Rest and take it easy for the first 24 hours.  A responsible adult is recommended to remain with you during that time.  It is normal to feel sleepy.  We encourage you to not do anything that requires balance, judgment or coordination.    MILD FLU-LIKE SYMPTOMS ARE NORMAL. YOU MAY EXPERIENCE GENERALIZED MUSCLE ACHES, THROAT IRRITATION, HEADACHE AND/OR SOME NAUSEA.    FOR 24 HOURS DO NOT:  Drive, operate machinery or run household appliances.  Drink beer or alcoholic beverages.   Make important decisions or sign legal documents.    SPECIAL INSTRUCTIONS: Follow MD instructions    AV Fistula, Care After  Refer to this sheet in the next few weeks. These instructions provide you with information on caring for yourself after your procedure. Your caregiver may also give you more specific instructions. Your treatment has been planned according to current medical practices, but problems sometimes occur. Call your caregiver if you have any problems or questions after your procedure.  HOME CARE INSTRUCTIONS   · Do not drive a car or take public transportation alone.  · Do not drink alcohol.  · Only take medicine that has been prescribed by your caregiver.  · Do not sign important papers or make important decisions.  · Have a responsible person with you.  · Ask your caregiver to show you how to check your access at home for a vibration (called a \"thrill\") or for a sound (called a \"bruit\" pronounced brew-ee).  · Your vein will need time to enlarge and mature so needles can be inserted for dialysis. Follow your caregiver's instructions about what you need to do to make this happen.  · Keep dressings clean and dry.  · Keep the arm elevated above your heart. Use a pillow.  · Rest.  · Use the arm as usual for all activities.  · Have the stitches or tape closures removed in 10 to 14 days, or as directed by your caregiver.  · Do not sleep or lie on the area of the fistula or that arm. This may decrease or stop the blood " flow through your fistula.  · Do not allow blood pressures to be taken on this arm.  · Do not allow blood drawing to be done from the graft.  · Do not wear tight clothing around the access site or on the arm.  · Avoid lifting heavy objects with the arm that has the fistula.  · Do not use creams or lotions over the access site.  SEEK MEDICAL CARE IF:   · You have a fever.  · You have swelling around the fistula that gets worse, or you have new pain.  · You have unusual bleeding at the fistula site or from any other area.  · You have pus or other drainage at the fistula site.  · You have skin redness or red streaking on the skin around, above, or below the fistula site.  · Your access site feels warm.  · You have any flu-like symptoms.  SEEK IMMEDIATE MEDICAL CARE IF:   · You have pain, numbness, or an unusual pale skin on the hand or on the side of your fistula.  · You have dizziness or weakness that you have not had before.  · You have shortness of breath.  · You have chest pain.  · Your fistula disconnects or breaks, and there is bleeding that cannot be easily controlled.  Call for local emergency medical help. Do not try to drive yourself to the hospital.  MAKE SURE YOU  · Understand these instructions.  · Will watch your condition.  · Will get help right away if you are not doing well or get worse.     This information is not intended to replace advice given to you by your health care provider. Make sure you discuss any questions you have with your health care provider.           DIET: To avoid nausea, slowly advance diet as tolerated, avoiding spicy or greasy foods for the first day.  Add more substantial food to your diet according to your physician's instructions.  Babies can be fed formula or breast milk as soon as they are hungry.  INCREASE FLUIDS AND FIBER TO AVOID CONSTIPATION.    SURGICAL DRESSING/BATHING: Keep dressing clean and dry until cleared by MD.    FOLLOW-UP APPOINTMENT:  A follow-up appointment  should be arranged with your doctor in 1-2 weeks; call to schedule.    You should CALL YOUR PHYSICIAN if you develop:  Fever greater than 101 degrees F.  Pain not relieved by medication, or persistent nausea or vomiting.  Excessive bleeding (blood soaking through dressing) or unexpected drainage from the wound.  Extreme redness or swelling around the incision site, drainage of pus or foul smelling drainage.  Inability to urinate or empty your bladder within 8 hours.  Problems with breathing or chest pain.    You should call 911 if you develop problems with breathing or chest pain.  If you are unable to contact your doctor or surgical center, you should go to the nearest emergency room or urgent care center.  Physician's telephone #:[phone]    If any questions arise, call your doctor.  If your doctor is not available, please feel free to call the Surgical Center at [phone].  The Center is open Monday through Friday from 7AM to 7PM.  You can also call the HEALTH HOTLINE open 24 hours/day, 7 days/week and speak to a nurse at [phone], or toll free at [phone].    A registered nurse may call you a few days after your surgery to see how you are doing after your procedure.    MEDICATIONS: Resume taking daily medication.  Take prescribed pain medication with food.  If no medication is prescribed, you may take non-aspirin pain medication if needed.  PAIN MEDICATION CAN BE VERY CONSTIPATING.  Take a stool softener or laxative such as senokot, pericolace, or milk of magnesia if needed.     Last pain medication given at 1:18pm.    If your physician has prescribed pain medication that includes Acetaminophen (Tylenol), do not take additional Acetaminophen (Tylenol) while taking the prescribed medication.    Depression / Suicide Risk    As you are discharged from this [facility] facility, it is important to learn how to keep safe from harming yourself.    Recognize the warning signs:  · Abrupt changes in  personality, positive or negative- including increase in energy   · Giving away possessions  · Change in eating patterns- significant weight changes-  positive or negative  · Change in sleeping patterns- unable to sleep or sleeping all the time   · Unwillingness or inability to communicate  · Depression  · Unusual sadness, discouragement and loneliness  · Talk of wanting to die  · Neglect of personal appearance   · Rebelliousness- reckless behavior  · Withdrawal from people/activities they love  · Confusion- inability to concentrate     If you or a loved one observes any of these behaviors or has concerns about self-harm, here's what you can do:  · Talk about it- your feelings and reasons for harming yourself  · Remove any means that you might use to hurt yourself (examples: pills, rope, extension cords, firearm)  · Get professional help from the community (Mental Health, Substance Abuse, psychological counseling)  · Do not be alone:Call your Safe Contact- someone whom you trust who will be there for you.  · Call your local CRISIS HOTLINE 465-0338 or 391-694-3684  · Call your local Children's Mobile Crisis Response Team [place] (274) 021-9419 or www.[employer]  · Call the toll free National Suicide Prevention Hotlines   · National Suicide Prevention Lifeline 142-772-KQIR (6999)  · National Hope Line Network 800-SUICIDE (881-9240)

## 2019-02-22 NOTE — OR NURSING
Spoke with Dr Cee regarding pt BP, OK to D/C pt.     Discharge information reviewed with patient and responsible adult. No questions or concerns at this time.     IV discontinued.     See vital sign flowsheets  for discharge details.

## 2019-02-22 NOTE — OR NURSING
Pt has bruit and thrill present. Assumed care of patient at approx 1717.  Patient alert and oriented x 4. See flowsheets for VS.  Pain is rated 0/10.     Call light and personal belongings within reach. Gurney in lowest position. Monitor alarms set appropriately.    Dressing is CDI . See flowsheets for detailed wound documentation.

## 2019-02-22 NOTE — OP REPORT
PreOp Diagnosis: Stage 5 renal disease.           PostOp Diagnosis: Same     Procedure(s):  RIGHT DISTAL RADIAL CEPHALIC AV FISTULA CREATION - Wound Class: Clean     Surgeon(s):  David Cason M.D.     Anesthesiologist/Type of Anesthesia: Right axillary nerve block  Anesthesiologist: Bryon Cee M.D./General    Description of procedure:     After obtaining informed consent the patient was taken to the operating room and positioned on the operating table.  Dr. Cee had placed a right axillary nerve block.  The patient also received sedation.  We prepped the right upper extremity with ChloraPrep and draped it sterilely.  I placed a Penrose drain around the right arm to act as a tourniquet.  The veins were dilated as a result in part of the axillary block.  Between the radial artery at the wrist and the cephalic vein at the wrist I injected 6 mL of half percent Sensorcaine.  The patient has a nerve block and we do not expect any significant pain.  At the wrist level midway between the radial artery and cephalic vein made a 4 cm long incision.  I dissected through the subcutaneous tissues and then over the dorsum of the wrist exposing the distal cephalic vein.  I meticulously dissected out the cephalic vein at the wrist and ligated one large branch and divided it.  There were several small branches that I ligated as well with 4-0 silk.  This dissection was then moved to the radial artery which exposed for approximately 3 cm.  It had palpable calcium and a strong pulse.  It looked as if it was a moderately large artery.  I put vessel loops around the artery and the vein proximal and distal in the exposed vessels pulling them side to side and apposition for the anastomosis.  4000 units of heparin was administered by Dr. Cee.  I used a combination of traction on the vessel loops and bulldog and profunda clamps to control the arterial inflow.  I made a 12 mm long arteriotomy and did a corresponding 12 mm venotomy.   Starting at the middle of the posterior aspect of the anastomosis so the artery and vein together side-to-side with 7-0 Prolene.  Prior to pulling up on the sutures at the final step of the anastomosis I gently inserted a 2 mm dilator into the cephalic vein.  It found no resistance up to the midforearm.  I also inserted at the very end of the procedure into the radial artery at the proximal end of the anastomosis to make sure there was no clamp.  Injury or constriction secondary to the vessel loops.  Excellent bleeding was observed.  I held the finger on the remaining open aspect of the anastomosis and pulled up on the sutures to control the bleeding.  When the sutures were tied there was a leak in the suture line at the distal and.  I repaired this with 7-0 Prolene.  I transected the distal aspect of the cephalic vein.  I had ligated at 2 locations with 4-0 silk.  There was a very prominent thrill present throughout the vein in the forearm.  I irrigated the wound.  I used some Surgi-Stone.  Hemostasis was achieved by pressure as well as bipolar and  standard cautery.  When I confirmed that there was no active bleeding I closed the wound with 2 layers of running 4-0 Vicryl.  Gauze and Tegaderm dressing were applied.  The patient had a palpable thrill up to the mid forearm.  His veins in his entire right upper extremity were large and dilated.  Blood loss was less than 10 mL.  Counts were reported to be correct.  The patient was taken to recovery room awake and in stable condition.

## 2019-02-22 NOTE — OR NURSING
Right arm dressing dry and intact.Right arm elevated in 2 pillows above heart level.Warm blanket given.Easily arousable.

## 2019-02-27 ENCOUNTER — PATIENT OUTREACH (OUTPATIENT)
Dept: HEALTH INFORMATION MANAGEMENT | Facility: OTHER | Age: 79
End: 2019-02-27

## 2019-02-28 NOTE — PROGRESS NOTES
Patient Parmjit Castelan discharged on 1/27/19. [facility] Patient Advocate assisted with discharge orders including one surgery follow-up with Dr. Cason. Patient is scheduled for a PCP appointment on 3/07/19. Patient discharged with a high LACE score, therefore, a PPS screening was conducted and the patient scored 80%.

## 2019-03-07 ENCOUNTER — OFFICE VISIT (OUTPATIENT)
Dept: MEDICAL GROUP | Facility: MEDICAL CENTER | Age: 79
End: 2019-03-07
Payer: MEDICARE

## 2019-03-07 VITALS
OXYGEN SATURATION: 97 % | RESPIRATION RATE: 18 BRPM | BODY MASS INDEX: 23.62 KG/M2 | DIASTOLIC BLOOD PRESSURE: 78 MMHG | WEIGHT: 155.87 LBS | HEART RATE: 74 BPM | SYSTOLIC BLOOD PRESSURE: 120 MMHG | HEIGHT: 68 IN | TEMPERATURE: 98.8 F

## 2019-03-07 DIAGNOSIS — I73.9 PERIPHERAL VASCULAR DISEASE (HCC): ICD-10-CM

## 2019-03-07 DIAGNOSIS — R21 RASH: ICD-10-CM

## 2019-03-07 DIAGNOSIS — F51.01 PRIMARY INSOMNIA: ICD-10-CM

## 2019-03-07 PROBLEM — R06.09 DYSPNEA ON EXERTION: Status: RESOLVED | Noted: 2017-03-16 | Resolved: 2019-03-07

## 2019-03-07 PROBLEM — Z85.828 HISTORY OF BASAL CELL CARCINOMA (BCC): Status: ACTIVE | Noted: 2019-03-07

## 2019-03-07 PROCEDURE — 99214 OFFICE O/P EST MOD 30 MIN: CPT | Performed by: PHYSICIAN ASSISTANT

## 2019-03-07 RX ORDER — TRIAMCINOLONE ACETONIDE 5 MG/G
CREAM TOPICAL
Qty: 60 G | Refills: 0 | Status: SHIPPED | OUTPATIENT
Start: 2019-03-07 | End: 2019-06-12

## 2019-03-07 RX ORDER — ZOLPIDEM TARTRATE 10 MG/1
10 TABLET ORAL
Qty: 90 TAB | Refills: 0 | Status: SHIPPED | OUTPATIENT
Start: 2019-03-07 | End: 2019-06-05

## 2019-03-07 RX ORDER — AMOXICILLIN AND CLAVULANATE POTASSIUM 500; 125 MG/1; MG/1
TABLET, FILM COATED ORAL
COMMUNITY
Start: 2019-02-16 | End: 2019-03-07

## 2019-03-07 NOTE — PROGRESS NOTES
Subjective:   Parmjit Castelan is a 78 y.o. male here today for insomnia, peripheral vascular disease and rash.    Primary insomnia  This is a 78-year-old male who is here today to get refill of his Ambien.  Takes 10 mg at nighttime along with trazodone.  States the medication is somewhat helpful but he still does not sleep a full night.  Requesting a refill today.  Has no side effects taking the medication.  Usually is given a 90-day supply.    Peripheral vascular disease (HCC)  Chronic history.  Recently had a surgery with Dr. Cason.  Complains of a painful fistula on the right forearm that was operated on recently.  Currently is on Keflex 500 mg twice a day for the infection.  Medication was provided by Dr. Cason.  States it has been 3-4 days of taking the medication and his symptoms are not improving.    Rash  Complains of an itching rash on the left forearm where another fistula is located.  States that his nephrologist provided him a prescription for what appears to be Bactroban.  Medication is not helping the itch.       Current medicines (including changes today)  Current Outpatient Prescriptions   Medication Sig Dispense Refill   • zolpidem (AMBIEN) 10 MG Tab Take 1 Tab by mouth every bedtime for 90 days. 90 Tab 0   • triamcinolone acetonide (KENALOG) 0.5 % Cream Apply three times daily sparingly X 7 days. 60 g 0   • furosemide (LASIX) 20 MG Tab Take 20 mg by mouth every day.     • fluticasone (FLONASE) 50 MCG/ACT nasal spray Spray 2 Sprays in nose every day.     • budesonide-formoterol (SYMBICORT) 160-4.5 MCG/ACT Aerosol Inhale 2 Puffs by mouth 2 Times a Day.     • doxazosin (CARDURA) 4 MG Tab Take 4 mg by mouth every evening.     • pravastatin (PRAVACHOL) 20 MG Tab Take 20 mg by mouth every evening.     • ROPINIRole (REQUIP) 0.5 MG Tab Take 1 mg by mouth every bedtime.     • lisinopril (PRINIVIL, ZESTRIL) 40 MG tablet TAKE ONE TABLET BY MOUTH TWICE A  Tab 3   • omeprazole (PRILOSEC) 40 MG  "delayed-release capsule TAKE ONE CAPSULE BY MOUTH DAILY 90 Cap 3   • Calcium Acetate, Phos Binder, 667 MG Cap TAKE 3 CAPSULES BY MOUTH WITH MEALS  AND 2 CAPSULES BY MOUTH WITH SNACKS (2 SNACKS) 390 Cap 11   • labetalol (NORMODYNE) 300 MG Tab TAKE TWO TABLETS BY MOUTH TWICE A DAY (NORMODYNE) 360 Tab 3   • Cholecalciferol (VITAMIN D PO) Take 1 Tab by mouth every day.     • clopidogrel (PLAVIX) 75 MG Tab TAKE ONE TABLET BY MOUTH DAILY 90 Tab 3   • traZODone (DESYREL) 150 MG Tab TAKE TWO TABLETS BY MOUTH EVERY NIGHT AT BEDTIME *DESYREL* 180 Tab 3     No current facility-administered medications for this visit.      He  has a past medical history of Anesthesia; Arterial leg ulcer (formerly [facility]) (11/8/2018); Basal cell carcinoma of left cheek (3/26/2015); BPH (7/14/2009); Bronchitis (12/25/2018); CAD (coronary artery disease) (2/20/2014); CATARACT; Chronic respiratory failure with hypoxia (formerly [facility]) (5/8/2016); CKD (chronic kidney disease) stage 4, GFR 15-29 ml/min (formerly [facility]) (1/15/2010); COPD (chronic obstructive pulmonary disease) (formerly [facility]); Detached retina; Dialysis; EMPHYSEMA; Gout; Heart burn; Heart murmur; High cholesterol; HTN (hypertension) (1/15/2010); Indigestion; Kidney cyst; Leg pain, bilateral (8/10/2015); On supplemental oxygen therapy; Peripheral vascular disease (formerly [facility]) (8/10/2015); Pneumonia; Primary insomnia (3/22/2018); Proteinuria; Sleep apnea; and Snoring.    Social History and Family History were reviewed and updated.    ROS   No chest pain, no shortness of breath, no abdominal pain and all other systems were reviewed and are negative.       Objective:     Blood pressure 120/78, pulse 74, temperature 37.1 °C (98.8 °F), temperature source Temporal, resp. rate 18, height 1.727 m (5' 8\"), weight 70.7 kg (155 lb 13.8 oz), SpO2 97 %. Body mass index is 23.7 kg/m².   Physical Exam:  Constitutional: Alert, no distress.  Skin: Warm, dry, good turgor.  Right forearm there is a scabbed lesion with surrounding noted.  No discharge.  " Left forearm there is a large fistula with areas of surrounding dryness and scabbing.  Eye: Equal, round and reactive, conjunctiva clear, lids normal.  ENMT: Lips without lesions, good dentition, oropharynx clear.  Neck: Trachea midline, no masses.   Lymph: No cervical or supraclavicular lymphadenopathy  Respiratory: Unlabored respiratory effort, lungs appear clear, no wheezes.  Cardiovascular: Normal S1, S2, no murmur, no edema.  Psych: Alert and oriented x3, normal affect and mood.        Assessment and Plan:   The following treatment plan was discussed    1. Primary insomnia  Chronic condition.   reviewed.  Medication appropriate.  Renewed Ambien at 10 mg and provided a 90-day supply.  Advised follow-up with his PCP for refills.  - zolpidem (AMBIEN) 10 MG Tab; Take 1 Tab by mouth every bedtime for 90 days.  Dispense: 90 Tab; Refill: 0    2. Peripheral vascular disease (HCC)  Chronic condition.  Follow with vascular surgery regarding his current ailments.  Advised to look at the bottle as Keflex is usually provided at 2000 mg daily.  The scab lesion on the right arm does not appear to be with any significant infection but it is warm and there is erythema about an inch surrounding the scab.    3. Rash  Chronic condition but new condition noted in chart.  Provided triamcinolone as directed.  Advised the cream he is currently using is likely an antibiotic ointment.  Follow-up with his PCP.  - triamcinolone acetonide (KENALOG) 0.5 % Cream; Apply three times daily sparingly X 7 days.  Dispense: 60 g; Refill: 0      Followup: Return in about 3 months (around 6/7/2019), or if symptoms worsen or fail to improve.    Please note that this dictation was created using voice recognition software. I have made every reasonable attempt to correct obvious errors, but I expect that there are errors of grammar and possibly content that I did not discover before finalizing the note.

## 2019-03-07 NOTE — ASSESSMENT & PLAN NOTE
This is a 78-year-old male who is here today to get refill of his Ambien.  Takes 10 mg at nighttime along with trazodone.  States the medication is somewhat helpful but he still does not sleep a full night.  Requesting a refill today.  Has no side effects taking the medication.  Usually is given a 90-day supply.

## 2019-03-07 NOTE — ASSESSMENT & PLAN NOTE
Complains of an itching rash on the left forearm where another fistula is located.  States that his nephrologist provided him a prescription for what appears to be Bactroban.  Medication is not helping the itch.   no discrete location documentation necessary

## 2019-03-07 NOTE — ASSESSMENT & PLAN NOTE
Chronic history.  Recently had a surgery with Dr. Cason.  Complains of a painful fistula on the right forearm that was operated on recently.  Currently is on Keflex 500 mg twice a day for the infection.  Medication was provided by Dr. Cason.  States it has been 3-4 days of taking the medication and his symptoms are not improving.

## 2019-03-20 RX ORDER — TRAZODONE HYDROCHLORIDE 150 MG/1
TABLET ORAL
Qty: 180 TAB | Refills: 3 | Status: SHIPPED | OUTPATIENT
Start: 2019-03-20 | End: 2019-06-12

## 2019-03-27 ENCOUNTER — APPOINTMENT (OUTPATIENT)
Dept: RADIOLOGY | Facility: MEDICAL CENTER | Age: 79
End: 2019-03-27
Attending: SURGERY
Payer: MEDICARE

## 2019-03-27 ENCOUNTER — HOSPITAL ENCOUNTER (OUTPATIENT)
Facility: MEDICAL CENTER | Age: 79
End: 2019-03-27
Attending: SURGERY | Admitting: SURGERY
Payer: MEDICARE

## 2019-03-27 VITALS
OXYGEN SATURATION: 97 % | HEIGHT: 68 IN | RESPIRATION RATE: 18 BRPM | DIASTOLIC BLOOD PRESSURE: 67 MMHG | BODY MASS INDEX: 24.49 KG/M2 | SYSTOLIC BLOOD PRESSURE: 142 MMHG | WEIGHT: 161.6 LBS | TEMPERATURE: 97.2 F | HEART RATE: 75 BPM

## 2019-03-27 DIAGNOSIS — I70.213 ATHEROSCLEROSIS OF NATIVE ARTERY OF BOTH LOWER EXTREMITIES WITH INTERMITTENT CLAUDICATION (HCC): ICD-10-CM

## 2019-03-27 LAB
ANION GAP SERPL CALC-SCNC: 14 MMOL/L (ref 0–11.9)
BASOPHILS # BLD AUTO: 0.8 % (ref 0–1.8)
BASOPHILS # BLD: 0.03 K/UL (ref 0–0.12)
BUN SERPL-MCNC: 53 MG/DL (ref 8–22)
CALCIUM SERPL-MCNC: 9.6 MG/DL (ref 8.5–10.5)
CHLORIDE SERPL-SCNC: 98 MMOL/L (ref 96–112)
CO2 SERPL-SCNC: 27 MMOL/L (ref 20–33)
CREAT SERPL-MCNC: 9.41 MG/DL (ref 0.5–1.4)
EOSINOPHIL # BLD AUTO: 0.12 K/UL (ref 0–0.51)
EOSINOPHIL NFR BLD: 3.1 % (ref 0–6.9)
ERYTHROCYTE [DISTWIDTH] IN BLOOD BY AUTOMATED COUNT: 54.9 FL (ref 35.9–50)
GLUCOSE SERPL-MCNC: 89 MG/DL (ref 65–99)
HCT VFR BLD AUTO: 37 % (ref 42–52)
HGB BLD-MCNC: 11.8 G/DL (ref 14–18)
IMM GRANULOCYTES # BLD AUTO: 0.01 K/UL (ref 0–0.11)
IMM GRANULOCYTES NFR BLD AUTO: 0.3 % (ref 0–0.9)
LYMPHOCYTES # BLD AUTO: 0.56 K/UL (ref 1–4.8)
LYMPHOCYTES NFR BLD: 14.5 % (ref 22–41)
MCH RBC QN AUTO: 31.8 PG (ref 27–33)
MCHC RBC AUTO-ENTMCNC: 31.9 G/DL (ref 33.7–35.3)
MCV RBC AUTO: 99.7 FL (ref 81.4–97.8)
MONOCYTES # BLD AUTO: 0.43 K/UL (ref 0–0.85)
MONOCYTES NFR BLD AUTO: 11.2 % (ref 0–13.4)
NEUTROPHILS # BLD AUTO: 2.7 K/UL (ref 1.82–7.42)
NEUTROPHILS NFR BLD: 70.1 % (ref 44–72)
NRBC # BLD AUTO: 0 K/UL
NRBC BLD-RTO: 0 /100 WBC
PLATELET # BLD AUTO: 156 K/UL (ref 164–446)
PMV BLD AUTO: 10.2 FL (ref 9–12.9)
POTASSIUM SERPL-SCNC: 4.6 MMOL/L (ref 3.6–5.5)
RBC # BLD AUTO: 3.71 M/UL (ref 4.7–6.1)
SODIUM SERPL-SCNC: 139 MMOL/L (ref 135–145)
WBC # BLD AUTO: 3.9 K/UL (ref 4.8–10.8)

## 2019-03-27 PROCEDURE — 160002 HCHG RECOVERY MINUTES (STAT)

## 2019-03-27 PROCEDURE — 700111 HCHG RX REV CODE 636 W/ 250 OVERRIDE (IP)

## 2019-03-27 PROCEDURE — 85025 COMPLETE CBC W/AUTO DIFF WBC: CPT

## 2019-03-27 PROCEDURE — 700102 HCHG RX REV CODE 250 W/ 637 OVERRIDE(OP)

## 2019-03-27 PROCEDURE — 99153 MOD SED SAME PHYS/QHP EA: CPT

## 2019-03-27 PROCEDURE — A9270 NON-COVERED ITEM OR SERVICE: HCPCS

## 2019-03-27 PROCEDURE — 700111 HCHG RX REV CODE 636 W/ 250 OVERRIDE (IP): Performed by: SURGERY

## 2019-03-27 PROCEDURE — 80048 BASIC METABOLIC PNL TOTAL CA: CPT

## 2019-03-27 PROCEDURE — 700117 HCHG RX CONTRAST REV CODE 255: Performed by: SURGERY

## 2019-03-27 RX ORDER — IODIXANOL 270 MG/ML
74 INJECTION, SOLUTION INTRAVASCULAR ONCE
Status: COMPLETED | OUTPATIENT
Start: 2019-03-27 | End: 2019-03-27

## 2019-03-27 RX ORDER — HEPARIN SODIUM,PORCINE 1000/ML
VIAL (ML) INJECTION
Status: COMPLETED
Start: 2019-03-27 | End: 2019-03-27

## 2019-03-27 RX ORDER — HEPARIN SODIUM 1000 [USP'U]/ML
1000 INJECTION, SOLUTION INTRAVENOUS; SUBCUTANEOUS ONCE
Status: COMPLETED | OUTPATIENT
Start: 2019-03-27 | End: 2019-03-27

## 2019-03-27 RX ORDER — VERAPAMIL HYDROCHLORIDE 2.5 MG/ML
INJECTION, SOLUTION INTRAVENOUS
Status: COMPLETED
Start: 2019-03-27 | End: 2019-03-27

## 2019-03-27 RX ORDER — MIDAZOLAM HYDROCHLORIDE 1 MG/ML
INJECTION INTRAMUSCULAR; INTRAVENOUS
Status: COMPLETED
Start: 2019-03-27 | End: 2019-03-27

## 2019-03-27 RX ORDER — SODIUM CHLORIDE 9 MG/ML
500 INJECTION, SOLUTION INTRAVENOUS
Status: DISCONTINUED | OUTPATIENT
Start: 2019-03-27 | End: 2019-03-27 | Stop reason: HOSPADM

## 2019-03-27 RX ORDER — OXYCODONE HCL 5 MG/5 ML
5-10 SOLUTION, ORAL ORAL EVERY 4 HOURS PRN
Status: DISCONTINUED | OUTPATIENT
Start: 2019-03-27 | End: 2019-03-27 | Stop reason: HOSPADM

## 2019-03-27 RX ORDER — HEPARIN SODIUM 1000 [USP'U]/ML
6000 INJECTION, SOLUTION INTRAVENOUS; SUBCUTANEOUS PRN
Status: DISCONTINUED | OUTPATIENT
Start: 2019-03-27 | End: 2019-03-27 | Stop reason: HOSPADM

## 2019-03-27 RX ORDER — HYDRALAZINE HYDROCHLORIDE 20 MG/ML
10 INJECTION INTRAMUSCULAR; INTRAVENOUS EVERY 6 HOURS PRN
Status: DISCONTINUED | OUTPATIENT
Start: 2019-03-27 | End: 2019-03-27 | Stop reason: HOSPADM

## 2019-03-27 RX ORDER — OXYCODONE HYDROCHLORIDE 5 MG/1
TABLET ORAL
Status: COMPLETED
Start: 2019-03-27 | End: 2019-03-27

## 2019-03-27 RX ORDER — HYDRALAZINE HYDROCHLORIDE 20 MG/ML
INJECTION INTRAMUSCULAR; INTRAVENOUS
Status: COMPLETED
Start: 2019-03-27 | End: 2019-03-27

## 2019-03-27 RX ORDER — MIDAZOLAM HYDROCHLORIDE 1 MG/ML
.5-2 INJECTION INTRAMUSCULAR; INTRAVENOUS PRN
Status: DISCONTINUED | OUTPATIENT
Start: 2019-03-27 | End: 2019-03-27 | Stop reason: HOSPADM

## 2019-03-27 RX ORDER — OXYCODONE HCL 5 MG/5 ML
SOLUTION, ORAL ORAL
Status: DISCONTINUED
Start: 2019-03-27 | End: 2019-03-27 | Stop reason: HOSPADM

## 2019-03-27 RX ADMIN — NITROGLYCERIN 25 ML: 20 INJECTION INTRAVENOUS at 13:59

## 2019-03-27 RX ADMIN — HEPARIN SODIUM 1000 UNITS: 1000 INJECTION, SOLUTION INTRAVENOUS; SUBCUTANEOUS at 13:16

## 2019-03-27 RX ADMIN — HYDRALAZINE HYDROCHLORIDE 10 MG: 20 INJECTION INTRAMUSCULAR; INTRAVENOUS at 15:36

## 2019-03-27 RX ADMIN — HEPARIN SODIUM 6000 UNITS: 1000 INJECTION, SOLUTION INTRAVENOUS; SUBCUTANEOUS at 12:51

## 2019-03-27 RX ADMIN — MIDAZOLAM HYDROCHLORIDE 1 MG: 1 INJECTION, SOLUTION INTRAMUSCULAR; INTRAVENOUS at 12:14

## 2019-03-27 RX ADMIN — MIDAZOLAM 0.5 MG: 1 INJECTION INTRAMUSCULAR; INTRAVENOUS at 13:29

## 2019-03-27 RX ADMIN — IODIXANOL 74 ML: 270 INJECTION, SOLUTION INTRAVASCULAR at 14:30

## 2019-03-27 RX ADMIN — MIDAZOLAM HYDROCHLORIDE 0.5 MG: 1 INJECTION, SOLUTION INTRAMUSCULAR; INTRAVENOUS at 13:29

## 2019-03-27 RX ADMIN — Medication 5 MG: at 14:43

## 2019-03-27 RX ADMIN — MIDAZOLAM 0.5 MG: 1 INJECTION INTRAMUSCULAR; INTRAVENOUS at 13:47

## 2019-03-27 RX ADMIN — MIDAZOLAM 1 MG: 1 INJECTION INTRAMUSCULAR; INTRAVENOUS at 12:14

## 2019-03-27 RX ADMIN — MIDAZOLAM 1 MG: 1 INJECTION INTRAMUSCULAR; INTRAVENOUS at 12:36

## 2019-03-27 RX ADMIN — FENTANYL CITRATE 25 MCG: 50 INJECTION INTRAMUSCULAR; INTRAVENOUS at 12:14

## 2019-03-27 RX ADMIN — VERAPAMIL HYDROCHLORIDE 5 MG: 2.5 INJECTION, SOLUTION INTRAVENOUS at 13:58

## 2019-03-27 NOTE — PROGRESS NOTES
IR Nursing Note:    Received report from Rosalinda BYRD, patient currently in NAD, Dr. Peters treating right lower extremity.  Report Given to Rosalinda and Latrice BYRD, all medications accounted for.

## 2019-03-27 NOTE — OR SURGEON
Immediate Post OP Note    PreOp Diagnosis: Right foot rest pain    PostOp Diagnosis: Same    Procedure(s):  ANGIOGRAM WITH RIGHT LOWER EXTREMITY ATHERECTOMY and angioplasty    Surgeon(s):ZACH Peters MD  [facility]    Anesthesiologist/Type of Anesthesia:Conscious sedation; Latrice lin RN    Estimated Blood Loss: min    Findings: Multiple areas of calcified plaque    Complications: none    [id number]    3/27/2019 2:26 PM Sera Peters M.D.

## 2019-03-27 NOTE — DISCHARGE INSTRUCTIONS
ACTIVITY: Rest and take it easy for the first 24 hours.  A responsible adult is recommended to remain with you during that time.  It is normal to feel sleepy.  We encourage you to not do anything that requires balance, judgment or coordination.    MILD FLU-LIKE SYMPTOMS ARE NORMAL. YOU MAY EXPERIENCE GENERALIZED MUSCLE ACHES, THROAT IRRITATION, HEADACHE AND/OR SOME NAUSEA.    FOR 24 HOURS DO NOT:  Drive, operate machinery or run household appliances.  Drink beer or alcoholic beverages.   Make important decisions or sign legal documents.    SPECIAL INSTRUCTIONS: ·   Minimal activity at home for 2 days. ·   No Pushing, pulling, or heavy lifting (10 LBS) for 2 weeks.     DIET: To avoid nausea, slowly advance diet as tolerated, avoiding spicy or greasy foods for the first day.  Add more substantial food to your diet according to your physician's instructions.  Babies can be fed formula or breast milk as soon as they are hungry.  INCREASE FLUIDS AND FIBER TO AVOID CONSTIPATION.    SURGICAL DRESSING/BATHING: ·   Keep dressings on for 2 days, then remove. ·   Keep incisions dry and clean. ·   Okay to shower after 2 days. ·   No bath for 10 days    FOLLOW-UP APPOINTMENT:  A follow-up appointment should be arranged with your doctor [phone]; call to schedule.    You should CALL YOUR PHYSICIAN if you develop:  Fever greater than 101 degrees F.  Pain not relieved by medication, or persistent nausea or vomiting.  Excessive bleeding (blood soaking through dressing) or unexpected drainage from the wound.  Extreme redness or swelling around the incision site, drainage of pus or foul smelling drainage.  Inability to urinate or empty your bladder within 8 hours.  Problems with breathing or chest pain.    You should call 911 if you develop problems with breathing or chest pain.  If you are unable to contact your doctor or surgical center, you should go to the nearest emergency room or urgent care center.  Physician's  telephone #: [phone]    If any questions arise, call your doctor.  If your doctor is not available, please feel free to call the Surgical Center at [phone].  The Center is open Monday through Friday from 7AM to 7PM.  You can also call the HEALTH HOTLINE open 24 hours/day, 7 days/week and speak to a nurse at [phone], or toll free at [phone].    A registered nurse may call you a few days after your surgery to see how you are doing after your procedure.    MEDICATIONS: Resume taking daily medication.  Take prescribed pain medication with food.  If no medication is prescribed, you may take non-aspirin pain medication if needed.  PAIN MEDICATION CAN BE VERY CONSTIPATING.  Take a stool softener or laxative such as senokot, pericolace, or milk of magnesia if needed.    If your physician has prescribed pain medication that includes Acetaminophen (Tylenol), do not take additional Acetaminophen (Tylenol) while taking the prescribed medication.    Depression / Suicide Risk    As you are discharged from this Critical access hospital facility, it is important to learn how to keep safe from harming yourself.    Recognize the warning signs:  · Abrupt changes in personality, positive or negative- including increase in energy   · Giving away possessions  · Change in eating patterns- significant weight changes-  positive or negative  · Change in sleeping patterns- unable to sleep or sleeping all the time   · Unwillingness or inability to communicate  · Depression  · Unusual sadness, discouragement and loneliness  · Talk of wanting to die  · Neglect of personal appearance   · Rebelliousness- reckless behavior  · Withdrawal from people/activities they love  · Confusion- inability to concentrate     If you or a loved one observes any of these behaviors or has concerns about self-harm, here's what you can do:  · Talk about it- your feelings and reasons for harming yourself  · Remove any means that you might use to hurt yourself  "(examples: pills, rope, extension cords, firearm)  · Get professional help from the community (Mental Health, Substance Abuse, psychological counseling)  · Do not be alone:Call your Safe Contact- someone whom you trust who will be there for you.  · Call your local CRISIS HOTLINE 403-4233 or 744-774-5632  · Call your local Children's Mobile Crisis Response Team [place] (616) 188-3558 or www.[employer]  · Call the toll free National Suicide Prevention Hotlines   · National Suicide Prevention Lifeline 793-228-FOMK (2665)  · [employer] 800-SUICIDE (789-8207)    Post Angiogram Groin Care Instructions     INSTRUCTIONS  2. Examine (look and feel) the site of your incision site TODAY so you can recognize changes that should be called to your doctor (see below).  3. Avoid straining either by lifting or pulling objects for 2 weeks. Avoid lifting over 10 pounds.   4. For at least 72 hours, if you should sneeze or cough, please hold pressure over your groin area.  5. If you should begin to have oozing from the catheterization site, please hold firm pressure and call your doctor's office immediately.  6. If profuse bleeding occurs from the catheterization site, hold firm pressure and call \"650\" immediately for assistance.  7. Remove bandage after 48 hours.     ACTIVITY  2. Limit activity as instructed by your doctor.  3. No driving or very limited driving with frequent stops for one week.   4. If you must take a long car ride, stop every hour and walk around the car.   5. Warm showers or baths are permitted after the bandage is removed. Avoid hot showers, baths, hot tubs, and swimming for 10 days.    PLEASE CALL YOUR DOCTOR IF:  1. Temperature elevation occurs.  2. Catheterization site becomes reddened or begins to drain.   3. Bruising appears to be new or not resolving. The bruise may move down your leg. This is normal.  4. The small round lump in the groin increases in size.  5. Any leg numbness, " aching, or discomfort (immediately).  6. Increasing discomfort in the leg at the insertion site.  7. Chest pains, even if relieved by Nitroglycerin.    MISCELLANEOUS INSTRUCTIONS  1. Bruising may occur as a result of heart catheterization. Some of the discoloration may travel down the leg, going from blue to green in color.  2. A small round lump under the catheterization site will remain for up to six weeks.  3. If any questions arise call your physician's office. You can also call the [facility] HOTLINE open 24 hours/day, 7 days/week and speak to a nurse at [phone], or toll free at [phone].   4. You should call 911 if you develop problems with breathing or chest pain.    FOR PROBLEMS CALL AKIL Peters AT: [phone]    I acknowledge receipt and understanding of these Home Care instructions.

## 2019-03-27 NOTE — PROGRESS NOTES
IR Nursing Note:    Patient consented by Dr. Peters, all questions answered. Dr. Peters performed RLE Angiogram with possible intervention. Left femoral artery accessed. The pt tolerated the procedure well; ETCo2 baseline 25, with consistent waveform during the procedure.  Angio seal deployed in left femoral artery. Gauze and tegaderm applied to left groin, CDI and soft.  Pt alert  and verbally appropriate post procedure, vital signs stable during procedure  and transport, see flow sheet for vital signs.  Report given to ROCHELLE Raman.  RN transported pt to [facility] with Sao2 monitor.      Angio-seal deployed at 1400. REF [id number]. LOT [id number]

## 2019-03-28 NOTE — OR NURSING
1416 Patient arrived to unit, awake and alert.  Access site clean, dry and soft.  Patient c/o pain in right leg. Wife at bedside.  Updated on plan of care, verbalized understanding.   1443 Patient medicated per MAR for pain.  1536 Access site with scant amount of drainage.  Patient medicated for blood pressure.  Wife anxious at times about bleeding and elevated blood pressure.  1600 Head of bed elevated.  1620 Patient ambulated, no additional bleeding noted to site.  1645 Pressure dressing placed per wife request.  All lines and monitors discontinued. Reviewed discharge paperwork with pt and wife. Discussed diet, activity, medications, follow up care and worsening symptoms. No questions at this time.   1700 Pt discharged home with wife via wheelchair by CNA.

## 2019-03-29 NOTE — OP REPORT
DATE OF SERVICE:  03/27/2019    PREOPERATIVE DIAGNOSIS:  Right leg rest pain.    POSTPROCEDURE DIAGNOSIS:  Right leg rest pain.    PROCEDURES PERFORMED:  Right lower extremity angiogram with superficial   femoral artery, popliteal artery atherectomy and angioplasty.    SURGEON:  Sera Peters MD    ANESTHESIA:  Conscious sedation.    ANESTHESIOLOGIST:  ____, RN    INDICATIONS:  The patient is a dialysis patient with heavily calcified   arteries.  He is taken to the angio suite today to treat areas of critical   stenosis in the right lower extremity.  Risks and benefits were explained and   include bleeding, infection, arteriovenous thrombosis.  He understands and   agrees to proceed.    DESCRIPTION OF PROCEDURE:  The patient was taken to the angiosuite, placed in   the supine position.  After adequate sedation, both groins were prepped and   draped in the usual sterile fashion with Chloraseptic prep, cloth towels and   paper drapes.  Access to the left femoral artery was obtained using ultrasound   guidance.  Guidewire was threaded followed by a 4-Marshallese sheath.  I then used   the Omniflush catheter to go up and over the aortic bifurcation, which was a   fairly steep bifurcation.  Glidewire was advanced into the superficial femoral   artery and then exchanged for a stiff zip wire.    I then exchanged for a 7-Marshallese destination sheath.  I used this with great   difficulty to go up and over the aortic bifurcation.  I had to really use more   force than I was comfortable to get a position in the external iliac artery   given the severe angulation.    The patient was then given heparin and I used a Perkins catheter and a V18   guidewire to gain access through heavily calcified right lower extremity   plaque.  There were several areas of functional occlusion at adductor canal,   the superficial femoral popliteal junction and 2 more areas in the popliteal   artery.  Three-vessel runoff was apparent.    I then  deployed an OMsignaleld embolic protection device.  I made sure that   heparin had been given and shows a jet stream 2.4-3.4 mm atherectomy device.    Atherectomy was performed through the majority of the superficial femoral and   popliteal artery taking flow passes through areas of heavily calcified plaque   and stenosis.  We had a difficult time with the device and there were several   times when we did not get good aspiration of blood flow by treatments.    Eventually, I felt I had good audible return with both blades down and blades   up.  The patient began complaining of right leg rest pain for no good reasons.    I used a 5x220 mm balloon and performed angioplasty through the superficial   femoral and popliteal artery.  I then went back and retrieved the filter and   was able to visualize flow through the right lower extremity.  Runoff through   the anterior tibial artery was very slow and unexplained.    I tried to use a Quick-Cross angled catheter with the V18 guidewire to gain   access through the anterior tibial artery.  Despite several attempts, I was   unable to get the catheter into the anterior tibial artery to prove distal   patency.  I decided given good flow through the posterior and peroneal artery   to abandon further attempts.  Final images showed excellent and brisk flow   through the superficial femoral and popliteal artery.    The patient received 74 mL Visipaque at a total fluoro dose of 163.99 mGy and   34.7 minutes of fluoro time.       ____________________________________     MD JOAO Llamas / DELANO    DD:  03/29/2019 15:28:30  DT:  03/29/2019 15:43:02    D#:  [id number]  Job#:  [id number]

## 2019-05-16 ENCOUNTER — HOSPITAL ENCOUNTER (OUTPATIENT)
Dept: LAB | Facility: MEDICAL CENTER | Age: 79
End: 2019-05-16
Attending: SURGERY
Payer: MEDICARE

## 2019-05-16 PROCEDURE — 36415 COLL VENOUS BLD VENIPUNCTURE: CPT

## 2019-05-16 PROCEDURE — 80048 BASIC METABOLIC PNL TOTAL CA: CPT

## 2019-05-16 PROCEDURE — 85025 COMPLETE CBC W/AUTO DIFF WBC: CPT

## 2019-05-17 LAB
ANION GAP SERPL CALC-SCNC: 14 MMOL/L (ref 0–11.9)
BASOPHILS # BLD AUTO: 0.9 % (ref 0–1.8)
BASOPHILS # BLD: 0.03 K/UL (ref 0–0.12)
BUN SERPL-MCNC: 50 MG/DL (ref 8–22)
CALCIUM SERPL-MCNC: 9.5 MG/DL (ref 8.5–10.5)
CHLORIDE SERPL-SCNC: 102 MMOL/L (ref 96–112)
CO2 SERPL-SCNC: 25 MMOL/L (ref 20–33)
CREAT SERPL-MCNC: 8.18 MG/DL (ref 0.5–1.4)
EOSINOPHIL # BLD AUTO: 0.21 K/UL (ref 0–0.51)
EOSINOPHIL NFR BLD: 6.2 % (ref 0–6.9)
ERYTHROCYTE [DISTWIDTH] IN BLOOD BY AUTOMATED COUNT: 58.4 FL (ref 35.9–50)
GLUCOSE SERPL-MCNC: 97 MG/DL (ref 65–99)
HCT VFR BLD AUTO: 32.9 % (ref 42–52)
HGB BLD-MCNC: 10.4 G/DL (ref 14–18)
IMM GRANULOCYTES # BLD AUTO: 0.01 K/UL (ref 0–0.11)
IMM GRANULOCYTES NFR BLD AUTO: 0.3 % (ref 0–0.9)
LYMPHOCYTES # BLD AUTO: 0.49 K/UL (ref 1–4.8)
LYMPHOCYTES NFR BLD: 14.5 % (ref 22–41)
MCH RBC QN AUTO: 31.7 PG (ref 27–33)
MCHC RBC AUTO-ENTMCNC: 31.6 G/DL (ref 33.7–35.3)
MCV RBC AUTO: 100.3 FL (ref 81.4–97.8)
MONOCYTES # BLD AUTO: 0.41 K/UL (ref 0–0.85)
MONOCYTES NFR BLD AUTO: 12.2 % (ref 0–13.4)
NEUTROPHILS # BLD AUTO: 2.22 K/UL (ref 1.82–7.42)
NEUTROPHILS NFR BLD: 65.9 % (ref 44–72)
NRBC # BLD AUTO: 0 K/UL
NRBC BLD-RTO: 0 /100 WBC
PLATELET # BLD AUTO: 191 K/UL (ref 164–446)
PMV BLD AUTO: 10.3 FL (ref 9–12.9)
POTASSIUM SERPL-SCNC: 4.5 MMOL/L (ref 3.6–5.5)
RBC # BLD AUTO: 3.28 M/UL (ref 4.7–6.1)
SODIUM SERPL-SCNC: 141 MMOL/L (ref 135–145)
WBC # BLD AUTO: 3.4 K/UL (ref 4.8–10.8)

## 2019-05-31 DIAGNOSIS — J44.9 CHRONIC OBSTRUCTIVE PULMONARY DISEASE, UNSPECIFIED COPD TYPE (HCC): ICD-10-CM

## 2019-05-31 DIAGNOSIS — R06.02 SOB (SHORTNESS OF BREATH): ICD-10-CM

## 2019-05-31 NOTE — PROGRESS NOTES
Pt is scheduled for PFT and FU with you next week. There is no order for a PFT; pt last seen May 2017 by Maryan Soriano, no recent PFT, though pt c/o increased difficulty breathing and frequent wheezing.  If appropriate at this time, please sign PFT order. If not, I can cancel the test until after you see him to assess. Thanks.

## 2019-06-05 ENCOUNTER — TELEPHONE (OUTPATIENT)
Dept: PULMONOLOGY | Facility: HOSPICE | Age: 79
End: 2019-06-05

## 2019-06-05 NOTE — TELEPHONE ENCOUNTER
Called [facility] to try and get pts old OPO that was ordered back in 2017. Spoke to Orlin he said he would look for it and call me back. Orlin called back and said that per the notes he had the pt had refused the OPO when they tried to get him set up with the device.

## 2019-06-06 ENCOUNTER — TELEPHONE (OUTPATIENT)
Dept: PULMONOLOGY | Facility: HOSPICE | Age: 79
End: 2019-06-06

## 2019-06-06 ENCOUNTER — OFFICE VISIT (OUTPATIENT)
Dept: PULMONOLOGY | Facility: HOSPICE | Age: 79
End: 2019-06-06
Payer: MEDICARE

## 2019-06-06 ENCOUNTER — APPOINTMENT (OUTPATIENT)
Dept: PULMONOLOGY | Facility: HOSPICE | Age: 79
End: 2019-06-06
Attending: NURSE PRACTITIONER
Payer: MEDICARE

## 2019-06-06 VITALS
BODY MASS INDEX: 23.95 KG/M2 | OXYGEN SATURATION: 96 % | RESPIRATION RATE: 16 BRPM | HEIGHT: 68 IN | HEART RATE: 86 BPM | WEIGHT: 158 LBS

## 2019-06-06 DIAGNOSIS — J44.1 COPD EXACERBATION (HCC): ICD-10-CM

## 2019-06-06 DIAGNOSIS — J44.9 CHRONIC OBSTRUCTIVE PULMONARY DISEASE, UNSPECIFIED COPD TYPE (HCC): ICD-10-CM

## 2019-06-06 DIAGNOSIS — R06.02 SOB (SHORTNESS OF BREATH): ICD-10-CM

## 2019-06-06 DIAGNOSIS — R09.02 HYPOXEMIA: ICD-10-CM

## 2019-06-06 DIAGNOSIS — G47.33 OSA (OBSTRUCTIVE SLEEP APNEA): ICD-10-CM

## 2019-06-06 DIAGNOSIS — N18.6 END STAGE RENAL DISEASE (HCC): ICD-10-CM

## 2019-06-06 PROCEDURE — 99214 OFFICE O/P EST MOD 30 MIN: CPT | Performed by: NURSE PRACTITIONER

## 2019-06-06 PROCEDURE — 94729 DIFFUSING CAPACITY: CPT | Performed by: INTERNAL MEDICINE

## 2019-06-06 PROCEDURE — 94060 EVALUATION OF WHEEZING: CPT | Performed by: INTERNAL MEDICINE

## 2019-06-06 PROCEDURE — 94726 PLETHYSMOGRAPHY LUNG VOLUMES: CPT | Performed by: INTERNAL MEDICINE

## 2019-06-06 RX ORDER — ALBUTEROL SULFATE 90 UG/1
2 AEROSOL, METERED RESPIRATORY (INHALATION) EVERY 4 HOURS PRN
Qty: 1 INHALER | Refills: 11 | Status: SHIPPED | OUTPATIENT
Start: 2019-06-06 | End: 2019-06-25

## 2019-06-06 RX ORDER — METHYLPREDNISOLONE 4 MG/1
TABLET ORAL
Qty: 21 TAB | Refills: 1 | Status: ON HOLD | OUTPATIENT
Start: 2019-06-06 | End: 2019-06-14

## 2019-06-06 RX ORDER — LEVALBUTEROL INHALATION SOLUTION 1.25 MG/3ML
1.25 SOLUTION RESPIRATORY (INHALATION) EVERY 4 HOURS PRN
Qty: 120 BULLET | Refills: 11 | Status: SHIPPED | OUTPATIENT
Start: 2019-06-06 | End: 2020-07-04

## 2019-06-06 ASSESSMENT — PULMONARY FUNCTION TESTS
FEV1/FVC_PERCENT_PREDICTED: 98
FVC: 1.92
FEV1/FVC_PERCENT_LLN: 60
FEV1/FVC_PERCENT_CHANGE: 135
FEV1/FVC_PERCENT_PREDICTED: 98
FEV1/FVC_PERCENT_CHANGE: 5
FVC_PREDICTED: 3.83
FEV1/FVC_PERCENT_PREDICTED: 72
FEV1/FVC: 67
FEV1: 1.07
FEV1/FVC_PERCENT_PREDICTED: 93
FVC_LLN: 3.20
FEV1/FVC: 71
FEV1_PERCENT_PREDICTED: 49
FEV1_PERCENT_CHANGE: 20
FVC: 1.59
FEV1_PERCENT_CHANGE: 27
FEV1/FVC_PREDICTED: 72
FEV1/FVC: 70.83
FEV1: 1.36
FEV1/FVC: 67
FEV1_PREDICTED: 2.74
FVC_PERCENT_PREDICTED: 41
FVC_PERCENT_PREDICTED: 50
FEV1_PERCENT_PREDICTED: 38
FEV1_LLN: 2.29
FEV1/FVC_PERCENT_PREDICTED: 92

## 2019-06-06 NOTE — PATIENT INSTRUCTIONS
Plan:    1) Reviewed PFT's in detail. He has evidence of severe obstructive lung disease. He feels he is experience an exacerbation of his COPD. RX for Medrol dose pack now. Step up Symbicort to Trelegy 1 puff INH daily, rinse mouth after use. RX refill for Proair. He state she has a home nebulizer and would like and RX for Xopenex nebulized solution as well.  2) He is intolerant to CPAP. He has been on nocturnal 02, but states he is not actually wearing it when he is sleeping. CNOX now to assess for nocturnal hypoxemia. He can call for results.  3) Pending follow up with Cardiologist.   4) Continue to follow with Nephrologist.   5) Handicap placard paperwork completed.  6) Ongoing smoking cessation encouraged. He quit in 2009. Consider referral to [facility]'s lung cancer screening program.   7) Continue Flonase nasal spray.  8) Follow up in 3 months to discuss response to Trelegy. Sooner OV if needed. We will review results of oximetry over the phone in the interim.

## 2019-06-06 NOTE — TELEPHONE ENCOUNTER
"Pt called in very upset stating that he needs an order put in for a CXR as he is very \"SOB and have trouble breathing\".  He was very upset as he stated he left a message and someone from out office called back and told him to go get his CXR done at [facility] because the order would be in his chart, but I didn't see any previous messages and he could not  remember who he spoke to. Please advise and sign order if appropriate.  "

## 2019-06-06 NOTE — PROGRESS NOTES
Chief Complaint   Patient presents with   • Results     PFT        HPI:  Parmjit Castelan is a 78 y.o. year old male here today for follow-up on his COPD and hypoxemia. He was last seen in our clinic in March 2017 with Kate MARRERO. He has a history of end stage renal disease and is on dialysis. He has a history of sleep apnea. He was intolerant to CPAP and has been on 02 at 2 LPM nocturnally. He was ordered an overnight oximetry on 02 when he was last seen in the office. However pt declined oximetry testing from his DME. He also has a history of Pulmonary Hypertension and is followed by Cardiology. Former smoker, quit in 2009.   He is compliant on Symbicort and Albuterol inhalers. He also has a Flonase nasal spray. He does not feel the Symbicort is working as well for him. He feels his dyspnea has been worse over the past 6 months. He is pending follow up with his Cardiologist to discuss as well. He states he is using his oxygen at bedtime, but takes it off when he falls asleep. He is waking un refreshed and is tired during the day. He notes frequent sinus congestion. He notes occasional sinus pressure. He denies purulent sinus congestion. He notes an occasional cough. He has seldom mucous production. He notes intermittent wheezing which is chronic for him. He denies any fevers or chills.   PFT's were updated today in the office and indicate an FEV1 of 1.07 L, 38% predicted with an FEV1/FVC ratio of 67 with a positive post bronchodilator response and a DLCO of 77% predicted.     Chest xray 12/27/2018;  1.  Cardiomegaly.  2.  Bibasilar atelectasis.    Echo 8/7/2018;  CONCLUSIONS  Normal left ventricular chamber size. Moderate concentric left   ventricular hypertrophy(LVPW 1.3 cm). Mildly reduced left ventricular   systolic function. Left ventricular ejection fraction is visually   estimated to be 55%.   Moderate pericardial effusion without evidence of hemodynamic   compromise.  Moderate aortic  "stenosis.  Compared to the images of the prior study done 5/2017-  there has been   no significant change in the degree of AS or pericardial effusion. LA   size has increased.     Past Medical History:   Diagnosis Date   • Anesthesia     \"needs more sedation\" (can sometimes hear MD during procedure)    • Arterial leg ulcer (HCC) 11/8/2018   • Basal cell carcinoma of left cheek 3/26/2015   • BPH 7/14/2009   • Bronchitis 12/25/2018   • CAD (coronary artery disease) 2/20/2014   • CATARACT     sanjuanita surgery complete   • Chronic respiratory failure with hypoxia ([facility]) 5/8/2016   • CKD (chronic kidney disease) stage 4, GFR 15-29 ml/min ([facility]) 1/15/2010    end stage renal disease   • COPD (chronic obstructive pulmonary disease) ([facility])     wears 2.5 L o2 sometimes when he sleeps   • Detached retina    • Dialysis     m,w,f [facility]   • EMPHYSEMA    • Gout    • Heart burn     occas   • Heart murmur     maria esther lee cardiologist   • High cholesterol    • HTN (hypertension) 1/15/2010   • Indigestion     occas   • Kidney cyst    • Leg pain, bilateral 8/10/2015   • On supplemental oxygen therapy    • Peripheral vascular disease ([facility]) 8/10/2015   • Pneumonia    • Primary insomnia 3/22/2018   • Proteinuria    • Sleep apnea     O2 PER CANULA  AT NIGHT 2l/m   • Snoring        Past Surgical History:   Procedure Laterality Date   • AV FISTULA CREATION Right 2/21/2019    Procedure: AV FISTULA CREATION - ARM;  Surgeon: David Cason M.D.;  Location: [facility];  Service: General   • FEMORAL ENDARTERECTOMY Left 1/25/2019    Procedure: FEMORAL ENDARTERECTOMY;  Surgeon: David Cason M.D.;  Location: [facility];  Service: General   • FEMORAL POPLITEAL BYPASS Left 1/25/2019    Procedure: LEFT FEMORAL POPLITEAL POLYTETRAFLUOROETHYLENE ePTFE(PROPATEN VASCULAR GRAFT) BYPASS;  Surgeon: David Cason M.D.;  Location: [facility];  Service: General   • ANGIOGRAM Left 1/25/2019    Procedure: LEFT LEG " ANGIOGRAM;  Surgeon: David Cason M.D.;  Location: SURGERY [facility];  Service: General   • ENDOSCOPY PROCEDURE  3/21/2018    Procedure: ENDOSCOPY PROCEDURE/UPPER;  Surgeon: Enrique Child D.O.;  Location: SURGERY [facility];  Service: Gastroenterology   • COLONOSCOPY - ENDO  8/15/2016    Procedure: COLONOSCOPY - ENDO;  Surgeon: Mane Whatley M.D.;  Location: ENDOSCOPY [facility];  Service:    • GASTROSCOPY WITH BIOPSY  8/13/2016    Procedure: GASTROSCOPY WITH BIOPSY;  Surgeon: Jorge Leavitt M.D.;  Location: ENDOSCOPY [facility];  Service:    • RECOVERY  12/23/2015    Procedure: VASCULAR CASE-CASON-LEFT ARM FISTULOGRAM WITH ANGIOPLASTY;  Surgeon: Recoveryonly Surgery;  Location: SURGERY PRE-POST PROC UNIT [facility];  Service:    • RECOVERY  3/24/2015    Performed by Recoveryonly Surgery at SURGERY PRE-POST PROC UNIT [facility]   • RECOVERY  7/29/2014    Performed by Ir-Recovery Surgery at SURGERY SAME DAY [facility]   • RECOVERY  3/24/2014    Performed by Ir[facility]Recovery Surgery at SURGERY [facility]   • RECOVERY  12/17/2013    Performed by Ir-Recovery Surgery at SURGERY SAME DAY [facility]   • RECOVERY  7/2/2013    Performed by Ir-Recovery Surgery at SURGERY SAME DAY [facility]   • AV FISTULA THROMBOLYSIS  7/2/2013    Performed by David Cason M.D. at SURGERY [facility]   • RECOVERY  1/29/2013    Performed by Ir-Recovery Surgery at SURGERY SAME DAY [facility]   • RECOVERY  7/23/2012    Performed by SURGERY, IR-RECOVERY at SURGERY SAME DAY [facility]   • VITRECTOMY POSTERIOR  10/11/2011    Performed by NAHUM GE at SURGERY SAME DAY [facility]   • RECOVERY  8/12/2011    Performed by SURGERY, IR-RECOVERY at SURGERY SAME DAY [facility]   • VITRECTOMY POSTERIOR  1/18/2011    Performed by NAHUM GE at SURGERY SAME DAY [facility]   • SCLERAL BUCKLING  1/18/2011    Performed by NAHUM GE at SURGERY SAME DAY [facility]   • AV FISTULOGRAM  9/17/2010     Performed by ALESHIA MOSCOSO at SURGERY [facility]   • ANGIOPLASTY BALLOON  9/17/2010    Performed by ALESHIA MOSCOSO at SURGERY [facility]   • AV FISTULOGRAM  7/23/2010    Performed by ALESHIA MOSCOSO at SURGERY [facility]   • ANGIOPLASTY BALLOON  7/23/2010    Performed by ALESHIA MOSCOSO at SURGERY [facility]   • AV FISTULA REVISION  2/19/2010    Performed by WILLIE HATCH at SURGERY [facility]   • AV FISTULA CREATION  2/12/2010    Performed by ALESHIA MOSCSOO at SURGERY [facility]   • CATARACT PHACO WITH IOL  4/8/08    Performed by STACEY PHILLIPS at SURGERY SAME DAY [facility]   • OTHER ORTHOPEDIC SURGERY  1998    right toe for facititis       Family History   Problem Relation Age of Onset   • Stroke Mother    • Hypertension Mother    • Lung Disease Father         Emphysema, resp failure   • Genitourinary () Maternal Aunt         hematuria   • Hypertension Brother        Social History     Social History   • Marital status:      Spouse name: N/A   • Number of children: N/A   • Years of education: N/A     Occupational History   • Not on file.     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     Other Topics Concern   • Not on file     Social History Narrative   • No narrative on file       ROS:  Constitutional: Denies fevers, chills, sweats, weight loss  Eyes: Denies glasses, vision loss, pain, drainage, double vision  Ears/Nose/Mouth/Throat: Denies ear ache, difficulty hearing, sore throat, persistent hoarseness, decayed teeth/toothache  Cardiovascular: Denies chest pain, tightness, palpitations, swelling in feet/legs, fainting, difficulty breathing  "when laying down  Respiratory: See HPI   GI: Denies heartburn, difficulty swallowing, nausea, vomiting, abdominal pain, diarrhea, constipation  : Denies frequent urination, painful urination  Integumentary: Denies rashes, lumps or color changes  MSK: Denies painful joints, sore muscles, and back pain.   Neurological: Denies frequent headaches, dizziness, weakness  Sleep: See HPI       Current Outpatient Prescriptions   Medication Sig Dispense Refill   • traZODone (DESYREL) 150 MG Tab TAKE TWO TABLETS BY MOUTH EVERY NIGHT AT BEDTIME *DESYREL* 180 Tab 3   • furosemide (LASIX) 20 MG Tab Take 20 mg by mouth every day.     • budesonide-formoterol (SYMBICORT) 160-4.5 MCG/ACT Aerosol Inhale 2 Puffs by mouth 2 Times a Day.     • doxazosin (CARDURA) 4 MG Tab Take 4 mg by mouth every evening.     • pravastatin (PRAVACHOL) 20 MG Tab Take 20 mg by mouth every evening.     • lisinopril (PRINIVIL, ZESTRIL) 40 MG tablet TAKE ONE TABLET BY MOUTH TWICE A  Tab 3   • omeprazole (PRILOSEC) 40 MG delayed-release capsule TAKE ONE CAPSULE BY MOUTH DAILY 90 Cap 3   • Calcium Acetate, Phos Binder, 667 MG Cap TAKE 3 CAPSULES BY MOUTH WITH MEALS  AND 2 CAPSULES BY MOUTH WITH SNACKS (2 SNACKS) 390 Cap 11   • labetalol (NORMODYNE) 300 MG Tab TAKE TWO TABLETS BY MOUTH TWICE A DAY (NORMODYNE) 360 Tab 3   • Cholecalciferol (VITAMIN D PO) Take 1 Tab by mouth every day.     • clopidogrel (PLAVIX) 75 MG Tab TAKE ONE TABLET BY MOUTH DAILY 90 Tab 3   • triamcinolone acetonide (KENALOG) 0.5 % Cream Apply three times daily sparingly X 7 days. (Patient not taking: Reported on 6/6/2019) 60 g 0   • fluticasone (FLONASE) 50 MCG/ACT nasal spray Spray 2 Sprays in nose every day.     • ROPINIRole (REQUIP) 0.5 MG Tab Take 1 mg by mouth every bedtime.       No current facility-administered medications for this visit.        No Known Allergies    Pulse 86   Resp 16   Ht 1.727 m (5' 8\")   Wt 71.7 kg (158 lb)   SpO2 96%  Declines BP testing     PE: "   Appearance: Well developed, well nourished, no acute distress  Eyes: PERRL, EOM intact, sclera white, conjunctiva moist  Ears: no lesions or deformities  Hearing: grossly intact  Nose: no lesions or deformities  Oropharynx: tongue normal, posterior pharynx without erythema or exudate  Neck: supple, trachea midline, no masses   Respiratory effort: no intercostal retractions or use of accessory muscles  Lung auscultation: diminished with faint scattered wheezing   Heart auscultation: murmur noted   Extremities: no cyanosis. 1+ bilateral lower extremity edema noted. Bilateral fistulas  Abdomen: soft ,non tender, no masses  Gait and Station: Ambulates with cane   Digits and nails: no clubbing, cyanosis, petechiae or nodes.  Cranial nerves: grossly intact  Skin: no rashes, lesions or ulcers noted  Orientation: Oriented to time, person and place  Mood and affect: mood and affect appropriate, normal interaction with examiner  Judgement: Intact          Assessment:    1. Chronic obstructive pulmonary disease, unspecified COPD type ([facility])  Fluticasone-Umeclidin-Vilant (TRELEGY ELLIPTA) 100-62.5-25 MCG/INH AEROSOL POWDER, BREATH ACTIVATED    albuterol (PROAIR HFA) 108 (90 Base) MCG/ACT Aero Soln inhalation aerosol    levalbuterol (XOPENEX) 1.25 MG/3ML Nebu Soln   2. HELGA (obstructive sleep apnea)  Overnight Oximetry   3. Hypoxemia  Overnight Oximetry   4. End stage renal disease (HCC)     5. COPD exacerbation ([facility])  MethylPREDNISolone (MEDROL DOSEPAK) 4 MG Tablet Therapy Pack         Plan:    1) Reviewed PFT's in detail. He has evidence of severe obstructive lung disease. He feels he is experience an exacerbation of his COPD. RX for Medrol dose pack now. Step up Symbicort to Trelegy 1 puff INH daily, rinse mouth after use. RX refill for Proair. He state she has a home nebulizer and would like and RX for Xopenex nebulized solution as well.  2) He is intolerant to CPAP. He has been on nocturnal 02, but states he is not actually  wearing it when he is sleeping. CNOX now to assess for nocturnal hypoxemia. He can call for results.  3) Pending follow up with Cardiologist.   4) Continue to follow with Nephrologist.   5) Handicap placard paperwork completed.  6) Ongoing smoking cessation encouraged. He quit in 2009. Consider referral to [facility]'s lung cancer screening program.   7) Continue Flonase nasal spray.  8) Follow up in 3 months to discuss response to Trelegy. Sooner OV if needed. We will review results of oximetry over the phone in the interim.

## 2019-06-06 NOTE — PROCEDURES
Good patient effort & cooperation.  The results of this test meet the ATS/ERS standards for acceptability and repeatability.  Predicted equations for Spirometry are N-Willian II, ITS for lung volumes, and [facility] for DLCO.  The DLCO was uncorrected for Hgb.  A bronchodilator of Ventolin HFA- 2puffs via spacer were administered.  DLCO was performed during dilation period.    The FVC is 1.92 L or 50%, FEV1 is 1.36 L and 49%, FEV1/FVC: 71%.  Significant bronchodilator response.  Total lung capacity: 73%.  DLCO: 77%.    Interpretation:  Mixed obstructive and restrictive ventilatory defects; the obstruction is consistent with COPD.  Clinical correlation required regarding restrictive physiology.  Significant bronchodilator response demonstrating asthmatic component.

## 2019-06-07 ENCOUNTER — HOSPITAL ENCOUNTER (OUTPATIENT)
Dept: RADIOLOGY | Facility: MEDICAL CENTER | Age: 79
End: 2019-06-07
Attending: NURSE PRACTITIONER
Payer: MEDICARE

## 2019-06-07 DIAGNOSIS — R06.02 SOB (SHORTNESS OF BREATH): ICD-10-CM

## 2019-06-07 PROCEDURE — 71046 X-RAY EXAM CHEST 2 VIEWS: CPT

## 2019-06-07 NOTE — TELEPHONE ENCOUNTER
He did not discuss getting a chest xray with me during his office visit. We did discuss his shortness of breath and I ordered a Medrol dosepack and changed his inhaler to Trelegy. He is scheduled to see his Cardiologist   6/13/2019. They may have ordered it. I am fine ordered a chest xray if he is experiencing increased shortness of breath. However please relay this was not initially recommended by me so he will need to make sure results get to the requesting provider whoever that may be.

## 2019-06-10 ENCOUNTER — TELEPHONE (OUTPATIENT)
Dept: MEDICAL GROUP | Facility: MEDICAL CENTER | Age: 79
End: 2019-06-10

## 2019-06-10 NOTE — TELEPHONE ENCOUNTER
VOICEMAIL  1. Caller Name: Parmjit Castelan                        Call Back Number: [phone] (home)      2. Message: Patient called and left a message stating he needed an appointment for his medications. I have called him back and left him a message stating that he can call [phone] to make an appointment if he does not get a hold of me.     3. Patient approves office to leave a detailed voicemail/MyChart message: yes

## 2019-06-11 ENCOUNTER — TELEPHONE (OUTPATIENT)
Dept: PULMONOLOGY | Facility: HOSPICE | Age: 79
End: 2019-06-11

## 2019-06-11 NOTE — TELEPHONE ENCOUNTER
Pt called and left a VM stating that he would like CRISTOPHER Rayo to call him with his CXR results. He said it was ok to leave a VM.

## 2019-06-12 ENCOUNTER — APPOINTMENT (OUTPATIENT)
Dept: RADIOLOGY | Facility: MEDICAL CENTER | Age: 79
DRG: 308 | End: 2019-06-12
Attending: EMERGENCY MEDICINE
Payer: MEDICARE

## 2019-06-12 ENCOUNTER — HOSPITAL ENCOUNTER (INPATIENT)
Facility: MEDICAL CENTER | Age: 79
LOS: 2 days | DRG: 308 | End: 2019-06-14
Attending: EMERGENCY MEDICINE | Admitting: HOSPITALIST
Payer: MEDICARE

## 2019-06-12 ENCOUNTER — OFFICE VISIT (OUTPATIENT)
Dept: URGENT CARE | Facility: CLINIC | Age: 79
End: 2019-06-12
Payer: MEDICARE

## 2019-06-12 VITALS
HEIGHT: 68 IN | WEIGHT: 158 LBS | OXYGEN SATURATION: 94 % | HEART RATE: 150 BPM | TEMPERATURE: 98.9 F | RESPIRATION RATE: 20 BRPM | BODY MASS INDEX: 23.95 KG/M2

## 2019-06-12 DIAGNOSIS — I48.92 ATRIAL FLUTTER WITH RAPID VENTRICULAR RESPONSE (HCC): ICD-10-CM

## 2019-06-12 DIAGNOSIS — I35.0 AORTIC STENOSIS, MODERATE: ICD-10-CM

## 2019-06-12 DIAGNOSIS — R00.0 WIDE-COMPLEX TACHYCARDIA: ICD-10-CM

## 2019-06-12 DIAGNOSIS — N18.6 ESRD (END STAGE RENAL DISEASE) ON DIALYSIS (HCC): ICD-10-CM

## 2019-06-12 DIAGNOSIS — R06.09 DYSPNEA ON EXERTION: ICD-10-CM

## 2019-06-12 DIAGNOSIS — Z99.2 ESRD (END STAGE RENAL DISEASE) ON DIALYSIS (HCC): ICD-10-CM

## 2019-06-12 DIAGNOSIS — I27.20 PULMONARY HYPERTENSION (HCC): ICD-10-CM

## 2019-06-12 DIAGNOSIS — N18.5 CHRONIC RENAL FAILURE, STAGE 5 (HCC): ICD-10-CM

## 2019-06-12 DIAGNOSIS — I48.92 NEW ONSET ATRIAL FLUTTER (HCC): ICD-10-CM

## 2019-06-12 DIAGNOSIS — I48.92 ATRIAL FLUTTER, UNSPECIFIED TYPE (HCC): Chronic | ICD-10-CM

## 2019-06-12 PROBLEM — Z79.01 CHRONIC ANTICOAGULATION: Chronic | Status: ACTIVE | Noted: 2019-06-12

## 2019-06-12 LAB
ALBUMIN SERPL BCP-MCNC: 3.5 G/DL (ref 3.2–4.9)
ALBUMIN/GLOB SERPL: 1.1 G/DL
ALP SERPL-CCNC: 115 U/L (ref 30–99)
ALT SERPL-CCNC: 19 U/L (ref 2–50)
ANION GAP SERPL CALC-SCNC: 19 MMOL/L (ref 0–11.9)
AST SERPL-CCNC: 14 U/L (ref 12–45)
BASOPHILS # BLD AUTO: 0.6 % (ref 0–1.8)
BASOPHILS # BLD: 0.02 K/UL (ref 0–0.12)
BILIRUB SERPL-MCNC: 1 MG/DL (ref 0.1–1.5)
BUN SERPL-MCNC: 92 MG/DL (ref 8–22)
CALCIUM SERPL-MCNC: 9.5 MG/DL (ref 8.4–10.2)
CHLORIDE SERPL-SCNC: 97 MMOL/L (ref 96–112)
CO2 SERPL-SCNC: 24 MMOL/L (ref 20–33)
CREAT SERPL-MCNC: 11.42 MG/DL (ref 0.5–1.4)
EKG IMPRESSION: NORMAL
EKG IMPRESSION: NORMAL
EOSINOPHIL # BLD AUTO: 0.07 K/UL (ref 0–0.51)
EOSINOPHIL NFR BLD: 2 % (ref 0–6.9)
ERYTHROCYTE [DISTWIDTH] IN BLOOD BY AUTOMATED COUNT: 61.4 FL (ref 35.9–50)
GLOBULIN SER CALC-MCNC: 3.2 G/DL (ref 1.9–3.5)
GLUCOSE SERPL-MCNC: 108 MG/DL (ref 65–99)
HCT VFR BLD AUTO: 33.6 % (ref 42–52)
HGB BLD-MCNC: 10.3 G/DL (ref 14–18)
IMM GRANULOCYTES # BLD AUTO: 0 K/UL (ref 0–0.11)
IMM GRANULOCYTES NFR BLD AUTO: 0 % (ref 0–0.9)
INR PPP: 1.12 (ref 0.87–1.13)
LYMPHOCYTES # BLD AUTO: 0.4 K/UL (ref 1–4.8)
LYMPHOCYTES NFR BLD: 11.3 % (ref 22–41)
MAGNESIUM SERPL-MCNC: 1.7 MG/DL (ref 1.5–2.5)
MCH RBC QN AUTO: 32.6 PG (ref 27–33)
MCHC RBC AUTO-ENTMCNC: 30.7 G/DL (ref 33.7–35.3)
MCV RBC AUTO: 106.3 FL (ref 81.4–97.8)
MONOCYTES # BLD AUTO: 0.42 K/UL (ref 0–0.85)
MONOCYTES NFR BLD AUTO: 11.9 % (ref 0–13.4)
NEUTROPHILS # BLD AUTO: 2.62 K/UL (ref 1.82–7.42)
NEUTROPHILS NFR BLD: 74.2 % (ref 44–72)
NRBC # BLD AUTO: 0 K/UL
NRBC BLD-RTO: 0 /100 WBC
PLATELET # BLD AUTO: 180 K/UL (ref 164–446)
PMV BLD AUTO: 9.8 FL (ref 9–12.9)
POTASSIUM SERPL-SCNC: 4.6 MMOL/L (ref 3.6–5.5)
PROT SERPL-MCNC: 6.7 G/DL (ref 6–8.2)
PROTHROMBIN TIME: 14.6 SEC (ref 12–14.6)
RBC # BLD AUTO: 3.16 M/UL (ref 4.7–6.1)
SODIUM SERPL-SCNC: 140 MMOL/L (ref 135–145)
TROPONIN I SERPL-MCNC: 0.34 NG/ML (ref 0–0.04)
WBC # BLD AUTO: 3.5 K/UL (ref 4.8–10.8)

## 2019-06-12 PROCEDURE — 700101 HCHG RX REV CODE 250: Performed by: INTERNAL MEDICINE

## 2019-06-12 PROCEDURE — 94640 AIRWAY INHALATION TREATMENT: CPT

## 2019-06-12 PROCEDURE — 93005 ELECTROCARDIOGRAM TRACING: CPT | Performed by: EMERGENCY MEDICINE

## 2019-06-12 PROCEDURE — A9270 NON-COVERED ITEM OR SERVICE: HCPCS | Performed by: EMERGENCY MEDICINE

## 2019-06-12 PROCEDURE — 85025 COMPLETE CBC W/AUTO DIFF WBC: CPT

## 2019-06-12 PROCEDURE — 99214 OFFICE O/P EST MOD 30 MIN: CPT | Performed by: PHYSICIAN ASSISTANT

## 2019-06-12 PROCEDURE — 83735 ASSAY OF MAGNESIUM: CPT

## 2019-06-12 PROCEDURE — 80053 COMPREHEN METABOLIC PANEL: CPT

## 2019-06-12 PROCEDURE — 71045 X-RAY EXAM CHEST 1 VIEW: CPT

## 2019-06-12 PROCEDURE — 99223 1ST HOSP IP/OBS HIGH 75: CPT | Performed by: INTERNAL MEDICINE

## 2019-06-12 PROCEDURE — 93005 ELECTROCARDIOGRAM TRACING: CPT

## 2019-06-12 PROCEDURE — 84484 ASSAY OF TROPONIN QUANT: CPT

## 2019-06-12 PROCEDURE — 93000 ELECTROCARDIOGRAM COMPLETE: CPT | Performed by: PHYSICIAN ASSISTANT

## 2019-06-12 PROCEDURE — 700102 HCHG RX REV CODE 250 W/ 637 OVERRIDE(OP): Performed by: EMERGENCY MEDICINE

## 2019-06-12 PROCEDURE — 94760 N-INVAS EAR/PLS OXIMETRY 1: CPT

## 2019-06-12 PROCEDURE — 770020 HCHG ROOM/CARE - TELE (206)

## 2019-06-12 PROCEDURE — 90935 HEMODIALYSIS ONE EVALUATION: CPT

## 2019-06-12 PROCEDURE — 99223 1ST HOSP IP/OBS HIGH 75: CPT | Mod: AI | Performed by: HOSPITALIST

## 2019-06-12 PROCEDURE — 700101 HCHG RX REV CODE 250

## 2019-06-12 PROCEDURE — 85610 PROTHROMBIN TIME: CPT

## 2019-06-12 PROCEDURE — 99285 EMERGENCY DEPT VISIT HI MDM: CPT

## 2019-06-12 RX ORDER — BENZONATATE 100 MG/1
100 CAPSULE ORAL 3 TIMES DAILY PRN
Status: DISCONTINUED | OUTPATIENT
Start: 2019-06-12 | End: 2019-06-14 | Stop reason: HOSPADM

## 2019-06-12 RX ORDER — LEVALBUTEROL INHALATION SOLUTION 1.25 MG/3ML
1.25 SOLUTION RESPIRATORY (INHALATION)
Status: DISPENSED | OUTPATIENT
Start: 2019-06-12 | End: 2019-06-13

## 2019-06-12 RX ORDER — ACETAMINOPHEN 325 MG/1
650 TABLET ORAL EVERY 6 HOURS PRN
Status: DISCONTINUED | OUTPATIENT
Start: 2019-06-12 | End: 2019-06-14 | Stop reason: HOSPADM

## 2019-06-12 RX ORDER — ALBUTEROL SULFATE 90 UG/1
2 AEROSOL, METERED RESPIRATORY (INHALATION) EVERY 4 HOURS PRN
Status: DISCONTINUED | OUTPATIENT
Start: 2019-06-12 | End: 2019-06-14 | Stop reason: HOSPADM

## 2019-06-12 RX ORDER — ZOLPIDEM TARTRATE 5 MG/1
10 TABLET ORAL
Status: DISCONTINUED | OUTPATIENT
Start: 2019-06-12 | End: 2019-06-14 | Stop reason: HOSPADM

## 2019-06-12 RX ORDER — TAMSULOSIN HYDROCHLORIDE 0.4 MG/1
0.8 CAPSULE ORAL EVERY MORNING
Status: DISCONTINUED | OUTPATIENT
Start: 2019-06-13 | End: 2019-06-14 | Stop reason: HOSPADM

## 2019-06-12 RX ORDER — AMOXICILLIN 250 MG
2 CAPSULE ORAL 2 TIMES DAILY
Status: DISCONTINUED | OUTPATIENT
Start: 2019-06-12 | End: 2019-06-14 | Stop reason: HOSPADM

## 2019-06-12 RX ORDER — LEVALBUTEROL INHALATION SOLUTION 1.25 MG/3ML
SOLUTION RESPIRATORY (INHALATION)
Status: COMPLETED
Start: 2019-06-12 | End: 2019-06-12

## 2019-06-12 RX ORDER — ONDANSETRON 2 MG/ML
4 INJECTION INTRAMUSCULAR; INTRAVENOUS EVERY 4 HOURS PRN
Status: DISCONTINUED | OUTPATIENT
Start: 2019-06-12 | End: 2019-06-14 | Stop reason: HOSPADM

## 2019-06-12 RX ORDER — BISACODYL 10 MG
10 SUPPOSITORY, RECTAL RECTAL
Status: DISCONTINUED | OUTPATIENT
Start: 2019-06-12 | End: 2019-06-14 | Stop reason: HOSPADM

## 2019-06-12 RX ORDER — POLYETHYLENE GLYCOL 3350 17 G/17G
1 POWDER, FOR SOLUTION ORAL
Status: DISCONTINUED | OUTPATIENT
Start: 2019-06-12 | End: 2019-06-14 | Stop reason: HOSPADM

## 2019-06-12 RX ORDER — IPRATROPIUM BROMIDE AND ALBUTEROL SULFATE 2.5; .5 MG/3ML; MG/3ML
3 SOLUTION RESPIRATORY (INHALATION)
Status: DISCONTINUED | OUTPATIENT
Start: 2019-06-12 | End: 2019-06-14 | Stop reason: HOSPADM

## 2019-06-12 RX ORDER — ZOLPIDEM TARTRATE 10 MG/1
10 TABLET ORAL
COMMUNITY
End: 2019-06-25 | Stop reason: SDUPTHER

## 2019-06-12 RX ORDER — PRAVASTATIN SODIUM 20 MG
20 TABLET ORAL NIGHTLY
Status: DISCONTINUED | OUTPATIENT
Start: 2019-06-12 | End: 2019-06-14 | Stop reason: HOSPADM

## 2019-06-12 RX ORDER — FUROSEMIDE 40 MG/1
80 TABLET ORAL 2 TIMES DAILY
Status: DISCONTINUED | OUTPATIENT
Start: 2019-06-12 | End: 2019-06-14 | Stop reason: HOSPADM

## 2019-06-12 RX ORDER — DOXAZOSIN 2 MG/1
4 TABLET ORAL EVERY EVENING
Status: DISCONTINUED | OUTPATIENT
Start: 2019-06-12 | End: 2019-06-14 | Stop reason: HOSPADM

## 2019-06-12 RX ORDER — LISINOPRIL 20 MG/1
40 TABLET ORAL 2 TIMES DAILY
Status: DISCONTINUED | OUTPATIENT
Start: 2019-06-12 | End: 2019-06-13

## 2019-06-12 RX ORDER — OMEPRAZOLE 40 MG/1
40 CAPSULE, DELAYED RELEASE ORAL EVERY MORNING
COMMUNITY
End: 2020-01-06

## 2019-06-12 RX ORDER — TRAZODONE HYDROCHLORIDE 150 MG/1
300 TABLET ORAL NIGHTLY
COMMUNITY
End: 2019-08-13

## 2019-06-12 RX ORDER — LIDOCAINE HYDROCHLORIDE 10 MG/ML
2 INJECTION, SOLUTION INFILTRATION; PERINEURAL ONCE
Status: COMPLETED | OUTPATIENT
Start: 2019-06-12 | End: 2019-06-12

## 2019-06-12 RX ORDER — TAMSULOSIN HYDROCHLORIDE 0.4 MG/1
0.8 CAPSULE ORAL EVERY MORNING
Status: ON HOLD | COMMUNITY
Start: 2019-05-31 | End: 2019-08-11

## 2019-06-12 RX ORDER — OMEPRAZOLE 20 MG/1
40 CAPSULE, DELAYED RELEASE ORAL EVERY MORNING
Status: DISCONTINUED | OUTPATIENT
Start: 2019-06-13 | End: 2019-06-14 | Stop reason: HOSPADM

## 2019-06-12 RX ORDER — FLUTICASONE PROPIONATE 44 UG/1
2 AEROSOL, METERED RESPIRATORY (INHALATION)
Status: DISCONTINUED | OUTPATIENT
Start: 2019-06-13 | End: 2019-06-14 | Stop reason: HOSPADM

## 2019-06-12 RX ORDER — TRAZODONE HYDROCHLORIDE 50 MG/1
300 TABLET ORAL NIGHTLY
Status: DISCONTINUED | OUTPATIENT
Start: 2019-06-12 | End: 2019-06-14 | Stop reason: HOSPADM

## 2019-06-12 RX ORDER — FLUTICASONE PROPIONATE 50 MCG
2 SPRAY, SUSPENSION (ML) NASAL DAILY
Status: DISCONTINUED | OUTPATIENT
Start: 2019-06-13 | End: 2019-06-14 | Stop reason: HOSPADM

## 2019-06-12 RX ORDER — ROPINIROLE 1 MG/1
1 TABLET, FILM COATED ORAL
Status: DISCONTINUED | OUTPATIENT
Start: 2019-06-12 | End: 2019-06-14 | Stop reason: HOSPADM

## 2019-06-12 RX ORDER — WARFARIN SODIUM 5 MG/1
5 TABLET ORAL
Status: COMPLETED | OUTPATIENT
Start: 2019-06-12 | End: 2019-06-13

## 2019-06-12 RX ORDER — DILTIAZEM HYDROCHLORIDE 5 MG/ML
10 INJECTION INTRAVENOUS
Status: DISCONTINUED | OUTPATIENT
Start: 2019-06-12 | End: 2019-06-14 | Stop reason: HOSPADM

## 2019-06-12 RX ORDER — ONDANSETRON 4 MG/1
4 TABLET, ORALLY DISINTEGRATING ORAL EVERY 4 HOURS PRN
Status: DISCONTINUED | OUTPATIENT
Start: 2019-06-12 | End: 2019-06-14 | Stop reason: HOSPADM

## 2019-06-12 RX ORDER — LISINOPRIL 40 MG/1
40 TABLET ORAL 2 TIMES DAILY
Status: ON HOLD | COMMUNITY
End: 2019-06-14

## 2019-06-12 RX ADMIN — LEVALBUTEROL HYDROCHLORIDE 1.25 MG: 1.25 SOLUTION RESPIRATORY (INHALATION) at 14:39

## 2019-06-12 RX ADMIN — DILTIAZEM HYDROCHLORIDE 60 MG: 30 TABLET, FILM COATED ORAL at 17:11

## 2019-06-12 RX ADMIN — LIDOCAINE HYDROCHLORIDE 2 ML: 10 INJECTION, SOLUTION INFILTRATION; PERINEURAL at 21:14

## 2019-06-12 ASSESSMENT — ENCOUNTER SYMPTOMS
POLYDIPSIA: 0
BLURRED VISION: 0
DOUBLE VISION: 0
COUGH: 0
CONSTIPATION: 0
PND: 0
SPUTUM PRODUCTION: 0
DIAPHORESIS: 0
NECK PAIN: 0
FEVER: 0
HALLUCINATIONS: 0
TINGLING: 0
DIZZINESS: 0
HEMOPTYSIS: 0
WHEEZING: 0
EYE PAIN: 0
TREMORS: 0
NAUSEA: 0
BACK PAIN: 0
CLAUDICATION: 0
PALPITATIONS: 0
PHOTOPHOBIA: 0
SINUS PAIN: 0
SINUS PAIN: 1
ORTHOPNEA: 0
SHORTNESS OF BREATH: 1
FALLS: 0
FEVER: 0
COUGH: 1
WEAKNESS: 0
BLOOD IN STOOL: 0
ABDOMINAL PAIN: 0
CHILLS: 0
CHILLS: 0
PALPITATIONS: 0
SHORTNESS OF BREATH: 1
BRUISES/BLEEDS EASILY: 0
VOMITING: 0
HEMOPTYSIS: 0
SPUTUM PRODUCTION: 0
WHEEZING: 0
HEARTBURN: 0
SORE THROAT: 0
SORE THROAT: 0
STRIDOR: 0
HEADACHES: 0
FLANK PAIN: 0
MYALGIAS: 0
DEPRESSION: 0
DIARRHEA: 0

## 2019-06-12 ASSESSMENT — COGNITIVE AND FUNCTIONAL STATUS - GENERAL
DAILY ACTIVITIY SCORE: 24
SUGGESTED CMS G CODE MODIFIER MOBILITY: CH
MOBILITY SCORE: 24
SUGGESTED CMS G CODE MODIFIER DAILY ACTIVITY: CH

## 2019-06-12 ASSESSMENT — PATIENT HEALTH QUESTIONNAIRE - PHQ9
SUM OF ALL RESPONSES TO PHQ9 QUESTIONS 1 AND 2: 0
2. FEELING DOWN, DEPRESSED, IRRITABLE, OR HOPELESS: NOT AT ALL
1. LITTLE INTEREST OR PLEASURE IN DOING THINGS: NOT AT ALL

## 2019-06-12 ASSESSMENT — COPD QUESTIONNAIRES
DO YOU EVER COUGH UP ANY MUCUS OR PHLEGM?: NO/ONLY WITH OCCASIONAL COLDS OR INFECTIONS
HAVE YOU SMOKED AT LEAST 100 CIGARETTES IN YOUR ENTIRE LIFE: YES
DURING THE PAST 4 WEEKS HOW MUCH DID YOU FEEL SHORT OF BREATH: MOST  OR ALL OF THE TIME
COPD SCREENING SCORE: 7

## 2019-06-12 ASSESSMENT — LIFESTYLE VARIABLES
SUBSTANCE_ABUSE: 0
EVER_SMOKED: YES

## 2019-06-12 NOTE — PROCEDURES
ECG Report Only ([id number])    Date/Time: 6/12/2019 12:56 PM  Performed by: WALI PETERSON  Authorized by: WALI PETERSON   Interpreted by ED physician: NEHA Peterson PA-C.  Comparison: compared with previous ECG from 2/21/2019  Comparison to previous ECG: New onset wide complex tachycardia  Rhythm comments: Wide-complex tachycardia  Rate: tachycardic  QRS axis: left  ST Segments: ST segments normal  T Waves: T waves normal  Other: no other findings  Clinical impression: abnormal ECG

## 2019-06-12 NOTE — PROGRESS NOTES
"Subjective:   Parmjit Castelan is a 78 y.o. male who presents for Cough (Few days dry cough )    This is a new problem.  Patient presents to urgent care with 2-week history of worsening dyspnea on exertion.  The patient has multiple chronic medical conditions including COPD, pulmonary hypertension, end-stage renal disease on dialysis.  He was seen by his pulmonologist 1 week ago with increasing dyspnea on exertion.  He was started on a Medrol Dosepak.  Patient reports no improvement in symptoms.  Patient denies any chest pain or pressure.  He reports the onset of worsening dyspnea on exertion seems to be associated with nasal congestion with facial pain and pressure and increase in cough.  Cough is nonproductive.  Patient denies fever however he does admit to chills but states that this occurs always after dialysis.  Patient is concerned about intervention today as he is scheduled to go to dialysis this afternoon.      Cough   Associated symptoms include shortness of breath. Pertinent negatives include no chest pain, chills, ear pain, fever, hemoptysis, sore throat or wheezing.     Review of Systems   Constitutional: Positive for malaise/fatigue. Negative for chills and fever.   HENT: Positive for congestion and sinus pain. Negative for ear pain and sore throat.    Respiratory: Positive for cough and shortness of breath. Negative for hemoptysis, sputum production and wheezing.    Cardiovascular: Positive for leg swelling. Negative for chest pain and palpitations.   All other systems reviewed and are negative.    No Known Allergies     Objective:   Pulse (!) 150   Temp 37.2 °C (98.9 °F) (Temporal)   Resp 20   Ht 1.727 m (5' 8\")   Wt 71.7 kg (158 lb)   SpO2 94%   BMI 24.02 kg/m²   Physical Exam   Constitutional: He is oriented to person, place, and time. He appears well-developed and well-nourished. He does not appear ill. No distress.   HENT:   Head: Normocephalic and atraumatic.   Right Ear: Tympanic membrane, " external ear and ear canal normal.   Left Ear: Tympanic membrane, external ear and ear canal normal.   Nose: Nose normal.   Mouth/Throat: Uvula is midline, oropharynx is clear and moist and mucous membranes are normal. No oropharyngeal exudate.   Eyes: Pupils are equal, round, and reactive to light. Conjunctivae and EOM are normal.   Neck: Normal range of motion. Neck supple.   Cardiovascular: Regular rhythm and normal heart sounds.  Tachycardia present.  Exam reveals no friction rub.    No murmur heard.  Pulses:       Dorsalis pedis pulses are 1+ on the right side, and 1+ on the left side.        Posterior tibial pulses are 1+ on the right side, and 1+ on the left side.   1+ bilateral pitting edema lower extremities   Pulmonary/Chest: Effort normal. No respiratory distress. He has wheezes in the right upper field, the right middle field, the right lower field, the left upper field, the left middle field and the left lower field. He has rhonchi in the right upper field, the right middle field, the right lower field, the left upper field, the left middle field and the left lower field.   Abdominal: Soft. Bowel sounds are normal. There is no hepatosplenomegaly. There is no tenderness.   Musculoskeletal: Normal range of motion.   Lymphadenopathy:        Head (right side): No submental, no submandibular and no tonsillar adenopathy present.        Head (left side): No submental, no submandibular and no tonsillar adenopathy present.     He has no cervical adenopathy.        Right: No supraclavicular adenopathy present.        Left: No supraclavicular adenopathy present.   Neurological: He is alert and oriented to person, place, and time. He has normal strength. No cranial nerve deficit or sensory deficit. Coordination normal.   Skin: Skin is warm and dry. No rash noted.   Psychiatric: He has a normal mood and affect. Judgment normal.   Vitals reviewed.    Fistula  bilateral forearm        Assessment/Plan:   Assessment     1. Dyspnea on exertion  - EKG - Clinic Performed    2. Wide-complex tachycardia (HCC)  - EKG - Clinic Performed    3. ESRD (end stage renal disease) on dialysis (HCC)    4. Aortic stenosis, moderate    5. Pulmonary hypertension (HCC)    Given the tachycardia EKG is obtained.  Patient has new onset wide-complex tachycardia.  Manual blood pressure performed using posterior tibial pulse.  I believe the patient warrants further evaluation at a higher level of care and have recommended that he present immediately to the emergency department for further evaluation and management.  Patient is concerned about the fact that he is due for his dialysis this afternoon.  Again, I reiterated the importance of going directly to the emergency room.  Patient is talking and in no acute distress.  He is not hypotensive here in the clinic with a blood pressure of 120/60 in the right lower extremity.  Therefore, I believe it is reasonable for him to drive himself to the emergency room.  Patient believes he will go to [facility] however he is not completely sure and therefore report is not called.  Case is discussed with physician in clinic today who agrees with the plan.    Differential diagnosis, natural history, supportive care, and indications for immediate follow-up discussed.    Please note that this note was created using voice recognition speech to text software. Every effort has been made to correct obvious errors.  However, I expect there are errors of grammar and possibly context that were not discovered prior to finalizing the note.        NEHA Peterson PA-C

## 2019-06-12 NOTE — ED PROVIDER NOTES
"ED Provider Note    ED Provider    Means of arrival: Private car  History obtained from: Patient  History limited by: None    CHIEF COMPLAINT  Chief Complaint   Patient presents with   • Rapid Heart Beat     per reports sent by  for \"WIDE COMPLEX TACHYCARDIA ON EKG.\"   • Cough       HPI  Parmjit Castelan is a 78 y.o. male who presents patient was at dialysis today, he did not get his dialysis and found his heart rate to be elevated at 150.  He had no chest pain no shortness of breath and no palpitations.  He is complaining of cough is been going on for several days.  He was seen by his pulmonologist chest x-ray was negative, he was given a short burst of steroids and he changed his inhalers and his cough it seems to be worsening.  He does complain of intermittent shortness of breath worse with exertion.    He is a known history of chronic renal failure, as stated he has not received his dialysis today    REVIEW OF SYSTEMS  See HPI for further details. All other systems are negative.     PAST MEDICAL HISTORY   has a past medical history of Anesthesia; Arterial leg ulcer ([facility]) (11/8/2018); Basal cell carcinoma of left cheek (3/26/2015); BPH (7/14/2009); Bronchitis (12/25/2018); CAD (coronary artery disease) (2/20/2014); CATARACT; Chronic respiratory failure with hypoxia ([facility]) (5/8/2016); CKD (chronic kidney disease) stage 4, GFR 15-29 ml/min ([facility]) (1/15/2010); COPD (chronic obstructive pulmonary disease) ([facility]); Detached retina; Dialysis; EMPHYSEMA; Gout; Heart burn; Heart murmur; High cholesterol; HTN (hypertension) (1/15/2010); Indigestion; Kidney cyst; Leg pain, bilateral (8/10/2015); On supplemental oxygen therapy; Peripheral vascular disease ([facility]) (8/10/2015); Pneumonia; Primary insomnia (3/22/2018); Proteinuria; Sleep apnea; and Snoring.    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       SURGICAL HISTORY   has a past surgical history that includes cataract phaco with iol (4/8/08); av fistula creation (2/12/2010); av fistula revision (2/19/2010); av fistulogram (7/23/2010); angioplasty balloon (7/23/2010); av fistulogram (9/17/2010); angioplasty balloon (9/17/2010); vitrectomy posterior (1/18/2011); scleral buckling (1/18/2011); recovery (8/12/2011); vitrectomy posterior (10/11/2011); recovery (7/23/2012); recovery (1/29/2013); recovery (7/2/2013); av fistula thrombolysis (7/2/2013); recovery (12/17/2013); recovery (3/24/2014); recovery (7/29/2014); recovery (3/24/2015); recovery (12/23/2015); gastroscopy with biopsy (8/13/2016); colonoscopy - endo (8/15/2016); endoscopy procedure (3/21/2018); femoral endarterectomy (Left, 1/25/2019); femoral popliteal bypass (Left, 1/25/2019); angiogram (Left, 1/25/2019); other orthopedic surgery (1998); and av fistula creation (Right, 2/21/2019).    CURRENT MEDICATIONS  Home Medications    **Home medications have not yet been reviewed for this encounter**         ALLERGIES  No Known Allergies    PHYSICAL EXAM  VITAL SIGNS: Pulse 98   Temp 37.2 °C (99 °F) (Temporal)   Resp 19   Ht 1.727 m (5' 8\")   Wt 76.8 kg (169 lb 5 oz)   SpO2 98%   BMI 25.74 kg/m²   Constitutional: Alert in no apparent distress.  HENT: No signs of trauma, Mucous membranes are moist   Eyes:  Conjunctiva normal, Non-icteric.   Neck: Normal range of motion, No tenderness, Supple,  Lymphatic: No lymphadenopathy noted.   Cardiovascular: Regular rate and rhythm, no murmurs.   Thorax & Lungs: Diffuse wheezes, No respiratory distress, No wheezing, No chest tenderness.   Abdomen: Bowel sounds normal, Soft, No tenderness, No masses, No pulsatile masses. No peritoneal signs.  Skin: " Warm, Dry,Normal color  Back: No bony tenderness, No CVA tenderness.   Extremities:No edema, No tenderness, No cyanosis,    Musculoskeletal: Good range of motion in all major joints. No tenderness to palpation or major deformities noted.   Neurologic: Alert ,Oriented x4, Normal motor function, Normal sensory function, No focal deficits noted.   Psychiatric: Affect normal, Judgment normal, Mood normal.       MEDICAL DECISION MAKING  This is a 78 y.o. male who presents the initial EKG shows narrow complex tachycardia, the computer generated picture of this showed pacer spikes the patient has no pacemaker.  The etiology of this tachycardia is unknown, when I evaluate the patient's heart rate was in the 90s.  Repeat EKG was done and it shows atrial flutter with a rate of 106.  The patient did have wheezes on evaluation.  And he will receive a DuoNeb treatment there is mild concern for this because it may increase his heart rate also.    DIAGNOSTIC STUDIES / PROCEDURES    EKG      LABS  Results for orders placed or performed during the hospital encounter of 06/12/19   CBC w/ Differential   Result Value Ref Range    WBC 3.5 (L) 4.8 - 10.8 K/uL    RBC 3.16 (L) 4.70 - 6.10 M/uL    Hemoglobin 10.3 (L) 14.0 - 18.0 g/dL    Hematocrit 33.6 (L) 42.0 - 52.0 %    .3 (H) 81.4 - 97.8 fL    MCH 32.6 27.0 - 33.0 pg    MCHC 30.7 (L) 33.7 - 35.3 g/dL    RDW 61.4 (H) 35.9 - 50.0 fL    Platelet Count 180 164 - 446 K/uL    MPV 9.8 9.0 - 12.9 fL    Neutrophils-Polys 74.20 (H) 44.00 - 72.00 %    Lymphocytes 11.30 (L) 22.00 - 41.00 %    Monocytes 11.90 0.00 - 13.40 %    Eosinophils 2.00 0.00 - 6.90 %    Basophils 0.60 0.00 - 1.80 %    Immature Granulocytes 0.00 0.00 - 0.90 %    Nucleated RBC 0.00 /100 WBC    Neutrophils (Absolute) 2.62 1.82 - 7.42 K/uL    Lymphs (Absolute) 0.40 (L) 1.00 - 4.80 K/uL    Monos (Absolute) 0.42 0.00 - 0.85 K/uL    Eos (Absolute) 0.07 0.00 - 0.51 K/uL    Baso (Absolute) 0.02 0.00 - 0.12 K/uL    Immature  Granulocytes (abs) 0.00 0.00 - 0.11 K/uL    NRBC (Absolute) 0.00 K/uL   Complete Metabolic Panel (CMP)   Result Value Ref Range    Sodium 140 135 - 145 mmol/L    Potassium 4.6 3.6 - 5.5 mmol/L    Chloride 97 96 - 112 mmol/L    Co2 24 20 - 33 mmol/L    Anion Gap 19.0 (H) 0.0 - 11.9    Glucose 108 (H) 65 - 99 mg/dL    Bun 92 (HH) 8 - 22 mg/dL    Creatinine 11.42 (HH) 0.50 - 1.40 mg/dL    Calcium 9.5 8.4 - 10.2 mg/dL    AST(SGOT) 14 12 - 45 U/L    ALT(SGPT) 19 2 - 50 U/L    Alkaline Phosphatase 115 (H) 30 - 99 U/L    Total Bilirubin 1.0 0.1 - 1.5 mg/dL    Albumin 3.5 3.2 - 4.9 g/dL    Total Protein 6.7 6.0 - 8.2 g/dL    Globulin 3.2 1.9 - 3.5 g/dL    A-G Ratio 1.1 g/dL   Troponin STAT   Result Value Ref Range    Troponin I 0.34 (H) 0.00 - 0.04 ng/mL   ESTIMATED GFR   Result Value Ref Range    GFR If African American 5 (A) >60 mL/min/1.73 m 2    GFR If Non African American 4 (A) >60 mL/min/1.73 m 2   EKG   Result Value Ref Range    Report       [facility] Emergency Dept.    Test Date:  2019  Pt Name:    EDVIN GREEN               Department: [facility]  MRN:        [id number]                      Room:  Gender:     Male                         Technician: [id number]  :        1940                   Requested By:ER TRIAGE PROTOCOL  Order #:    [id number]                    Reading MD: FIOR FRANCO DLiseOLise    Measurements  Intervals                                Axis  Rate:       150                          P:          0  MD:                                      QRS:        -19  QRSD:       124                          T:          70  QT:         360  QTc:        569    Interpretive Statements  VENTRICULAR-PACED COMPLEXES  NO FURTHER RHYTHM ANALYSIS ATTEMPTED DUE TO PACED RHYTHM  NONSPECIFIC INTRAVENTRICULAR CONDUCTION DELAY  BORDERLINE ST DEPRESSION, ANTEROLATERAL LEADS  Compared to ECG 2019 11:50:07  Intraventricular conduction delay now present  Sinus rhythm no longer  present  Right bund le-branch block no longer present  ST (T wave) deviation still present    Electronically Signed On 2019 14:03:40 PDT by FIOR FRANCO D.O.     EKG   Result Value Ref Range    Report       [facility] Emergency Dept.    Test Date:  2019  Pt Name:    EDVIN GREEN               Department[facility]  MRN:        [id number]                      [facility]  Gender:     Male                         Technician: HARI  :        1940                   Requested By:FIOR FRANCO  Order #:    [id number]                    Reading MD:    Measurements  Intervals                                Axis  Rate:       106                          P:  NC:                                      QRS:        -66  QRSD:       140                          T:          145  QT:         368  QTc:        489    Interpretive Statements  Atrial flutter with predominant 3:1 AV block  RBBB and LAFB  Inferior infarct, old  Compared to ECG 2019 13:39:56  AV block, advanced (high-grade) now present  Left anterior fascicular block now present  Right bundle-branch block now present  Myocardial infarct finding now present  Ventricular-paced complex(es) or rhythm  no longer present  Intraventricular conduction delay no longer present  ST (T wave) deviation no longer present       Correction of the first EKG reading.  He had a narrow complex tachycardia with a ST segment depressions.  There were pacer spikes seen on the image on the computer, which were not seen actually on the actual EKG.      RADIOLOGY  DX-CHEST-PORTABLE (1 VIEW)   Final Result      1.  Cardiomegaly.      2.  Linear atelectasis within the right lung base.          COURSE  Pertinent Labs & Imaging studies reviewed. (See chart for details)    Patient presented with tachycardia.  His initial EKG showed a narrow complex tachycardia and this did resolve on its own and repeat EKG shows that he is got atrial flutter.   This consistent with a history that he had with the recent visit to his pulmonologist who found to be tachycardic initially that did resolve.  He is going in and out of a 2 to 3-1 block.  He has had no chest pain.  Is complaining of some shortness of breath and wheezes albuterol was given his wheezes are unchanged shortness of breath is resolved with rest.  His troponin is elevated.  The significance of this is unclear because he does have a history of chronic renal failure which causes chronic elevated of troponin.  I spoke with the hospitalist Dr. Monreal for admission, the patient will be admitted for further evaluation and treatment.  Spoke with the patient and the wife at the bedside and explained to them the findings and the need for medication for rate control, and anticoagulation.  Call is out to his cardiologist for consultation.    Spoke to Dr. Hoang for a consultation.  He recommends use of Coumadin and heparin due to his renal failure.  The patient already received the first dose of Xarelto.  Spoke with Dr. Monreal concerning this.    Critical care time 30 minutes  2:23 PM - Patient seen and examined at bedside. Discussed plan of care,   FINAL IMPRESSION  1. Atrial flutter with rapid ventricular response (HCC)    2. New onset atrial flutter (HCC)    3. Chronic renal failure, stage 5 (HCC)

## 2019-06-12 NOTE — ED NOTES
Pt roomed. Attached to vitals machine. VSS. Call light in reach. Wife at bedside. All needs met at this time. Waiting for MD

## 2019-06-12 NOTE — FLOWSHEET NOTE
06/12/19 1439   Events/Summary/Plan   Events/Summary/Plan Tx given   Interdisciplinary Plan of Care-Goals (Indications)   Bronchodilator Indications History / Diagnosis   Interdisciplinary Plan of Care-Outcomes    Bronchodilator Outcome Patient at Stable Baseline   Education   Education Yes - Pt. / Family has been Instructed in use of Respiratory Medications and Adverse Reactions   RT Assessment of Delivered Medications   Evaluation of Medication Delivery Daily Yes-- Pt /Family has been Instructed in use of Respiratory Medications and Adverse Reactions   SVN Group   #SVN Performed Yes   Given By: Mouthpiece   Date SVN Last Changed 06/12/19   Date SVN Next Change Due (Q 7 Days) 06/19/19   Respiratory WDL   Respiratory (WDL) X   Chest Exam   Respiration 19   Pulse 98   Heart Rate (Monitored) (!) 102   Breath Sounds   Pre/Post Intervention Post Intervention Assessment   RUL Breath Sounds Clear   RML Breath Sounds Diminished   RLL Breath Sounds Diminished   SILVANA Breath Sounds Clear   LLL Breath Sounds Diminished   Secretions   Cough Congested;Non Productive;Strong   Oximetry   #Pulse Oximetry (Single Determination) Yes   Oxygen   Home O2 Use Prior To Admission? Yes   Home O2 LPM Flow 2.5 LPM   Home O2 Delivery Method Silicone Nasal Cannula   Home O2 Frequency of Use As Needed for SOB   Pulse Oximetry 98 %   O2 (LPM) 0   O2 Daily Delivery Respiratory  Room Air with O2 Available

## 2019-06-12 NOTE — CONSULTS
"Reason for Consult:  Asked by Dr Eulogio Wilcox D.O. to see this patient with newly diagnosed atrial flutter  Patient's PCP: Martha Light M.D.    CC:   Chief Complaint   Patient presents with   • Rapid Heart Beat     per reports sent by  for \"WIDE COMPLEX TACHYCARDIA ON EKG.\"   • Cough       HPI: This is a very pleasant 78-year-old gentleman on hemodialysis with history of pulmonary disease moderate aortic stenosis and peripheral arterial disease with a history of normal stress testing who presents with tachycardia urgent care evaluation was sent to the ER apparently tachycardia initially noted at recent follow-up with vascular medicine but this improved over his visit and so he was going to follow-up tomorrow with Dr. Chicas office    Here in the emergency room he was found to have atrial flutter with 2 1 conduction is slowed with diltiazem    He has been taking Plavix for his peripheral arterial disease he understands somewhat of Coumadin    Medications / Drug list prior to admission:  No current facility-administered medications on file prior to encounter.      Current Outpatient Prescriptions on File Prior to Encounter   Medication Sig Dispense Refill   • MethylPREDNISolone (MEDROL DOSEPAK) 4 MG Tablet Therapy Pack Take as directed. 21 Tab 1   • Fluticasone-Umeclidin-Vilant (TRELEGY ELLIPTA) 100-62.5-25 MCG/INH AEROSOL POWDER, BREATH ACTIVATED Inhale 1 Puff by mouth every day. Rinse mouth after use 1 Each 11   • albuterol (PROAIR HFA) 108 (90 Base) MCG/ACT Aero Soln inhalation aerosol Inhale 2 Puffs by mouth every four hours as needed for Shortness of Breath (wheezing). 1 Inhaler 11   • levalbuterol (XOPENEX) 1.25 MG/3ML Nebu Soln 3 mL by Nebulization route every four hours as needed for Shortness of Breath. 120 Bullet 11   • traZODone (DESYREL) 150 MG Tab TAKE TWO TABLETS BY MOUTH EVERY NIGHT AT BEDTIME *DESYREL* 180 Tab 3   • triamcinolone acetonide (KENALOG) 0.5 % Cream Apply three times daily " sparingly X 7 days. (Patient not taking: Reported on 6/6/2019) 60 g 0   • furosemide (LASIX) 20 MG Tab Take 20 mg by mouth every day.     • fluticasone (FLONASE) 50 MCG/ACT nasal spray Spray 2 Sprays in nose every day.     • budesonide-formoterol (SYMBICORT) 160-4.5 MCG/ACT Aerosol Inhale 2 Puffs by mouth 2 Times a Day.     • doxazosin (CARDURA) 4 MG Tab Take 4 mg by mouth every evening.     • pravastatin (PRAVACHOL) 20 MG Tab Take 20 mg by mouth every evening.     • ROPINIRole (REQUIP) 0.5 MG Tab Take 1 mg by mouth every bedtime.     • lisinopril (PRINIVIL, ZESTRIL) 40 MG tablet TAKE ONE TABLET BY MOUTH TWICE A  Tab 3   • omeprazole (PRILOSEC) 40 MG delayed-release capsule TAKE ONE CAPSULE BY MOUTH DAILY 90 Cap 3   • Calcium Acetate, Phos Binder, 667 MG Cap TAKE 3 CAPSULES BY MOUTH WITH MEALS  AND 2 CAPSULES BY MOUTH WITH SNACKS (2 SNACKS) 390 Cap 11   • labetalol (NORMODYNE) 300 MG Tab TAKE TWO TABLETS BY MOUTH TWICE A DAY (NORMODYNE) 360 Tab 3   • Cholecalciferol (VITAMIN D PO) Take 1 Tab by mouth every day.     • clopidogrel (PLAVIX) 75 MG Tab TAKE ONE TABLET BY MOUTH DAILY 90 Tab 3       Current list of administered Medications:    Current Facility-Administered Medications:   •  ipratropium-albuterol (DUONEB) nebulizer solution, 3 mL, Nebulization, Q4H PRN (RT), Eulogio Wilcox D.O.  •  levalbuterol (XOPENEX) 1.25 MG/3ML nebulizer solution 1.25 mg, 1.25 mg, Nebulization, ONCE (RT), Eulogio Wilcox D.O.  •  DILTIAZem (CARDIZEM) tablet 60 mg, 60 mg, Oral, Once, Eulogio Wilcox D.O.  •  rivaroxaban (XARELTO) tablet 15 mg, 15 mg, Oral, Once, Eulogio Wilcox D.O.    Current Outpatient Prescriptions:   •  MethylPREDNISolone (MEDROL DOSEPAK) 4 MG Tablet Therapy Pack, Take as directed., Disp: 21 Tab, Rfl: 1  •  Fluticasone-Umeclidin-Vilant (TRELEGY ELLIPTA) 100-62.5-25 MCG/INH AEROSOL POWDER, BREATH ACTIVATED, Inhale 1 Puff by mouth every day. Rinse mouth after use, Disp: 1 Each, Rfl: 11  •  albuterol  "(PROAIR HFA) 108 (90 Base) MCG/ACT Aero Soln inhalation aerosol, Inhale 2 Puffs by mouth every four hours as needed for Shortness of Breath (wheezing)., Disp: 1 Inhaler, Rfl: 11  •  levalbuterol (XOPENEX) 1.25 MG/3ML Nebu Soln, 3 mL by Nebulization route every four hours as needed for Shortness of Breath., Disp: 120 Bullet, Rfl: 11  •  traZODone (DESYREL) 150 MG Tab, TAKE TWO TABLETS BY MOUTH EVERY NIGHT AT BEDTIME *DESYREL*, Disp: 180 Tab, Rfl: 3  •  triamcinolone acetonide (KENALOG) 0.5 % Cream, Apply three times daily sparingly X 7 days. (Patient not taking: Reported on 6/6/2019), Disp: 60 g, Rfl: 0  •  furosemide (LASIX) 20 MG Tab, Take 20 mg by mouth every day., Disp: , Rfl:   •  fluticasone (FLONASE) 50 MCG/ACT nasal spray, Spray 2 Sprays in nose every day., Disp: , Rfl:   •  budesonide-formoterol (SYMBICORT) 160-4.5 MCG/ACT Aerosol, Inhale 2 Puffs by mouth 2 Times a Day., Disp: , Rfl:   •  doxazosin (CARDURA) 4 MG Tab, Take 4 mg by mouth every evening., Disp: , Rfl:   •  pravastatin (PRAVACHOL) 20 MG Tab, Take 20 mg by mouth every evening., Disp: , Rfl:   •  ROPINIRole (REQUIP) 0.5 MG Tab, Take 1 mg by mouth every bedtime., Disp: , Rfl:   •  lisinopril (PRINIVIL, ZESTRIL) 40 MG tablet, TAKE ONE TABLET BY MOUTH TWICE A DAY, Disp: 180 Tab, Rfl: 3  •  omeprazole (PRILOSEC) 40 MG delayed-release capsule, TAKE ONE CAPSULE BY MOUTH DAILY, Disp: 90 Cap, Rfl: 3  •  Calcium Acetate, Phos Binder, 667 MG Cap, TAKE 3 CAPSULES BY MOUTH WITH MEALS  AND 2 CAPSULES BY MOUTH WITH SNACKS (2 SNACKS), Disp: 390 Cap, Rfl: 11  •  labetalol (NORMODYNE) 300 MG Tab, TAKE TWO TABLETS BY MOUTH TWICE A DAY (NORMODYNE), Disp: 360 Tab, Rfl: 3  •  Cholecalciferol (VITAMIN D PO), Take 1 Tab by mouth every day., Disp: , Rfl:   •  clopidogrel (PLAVIX) 75 MG Tab, TAKE ONE TABLET BY MOUTH DAILY, Disp: 90 Tab, Rfl: 3    Past Medical History:   Diagnosis Date   • Anesthesia     \"needs more sedation\" (can sometimes hear MD during procedure)    • " Arterial leg ulcer (Formerly [facility]) 11/8/2018   • Basal cell carcinoma of left cheek 3/26/2015   • BPH 7/14/2009   • Bronchitis 12/25/2018   • CAD (coronary artery disease) 2/20/2014   • CATARACT     sanjuanita surgery complete   • Chronic respiratory failure with hypoxia (Formerly [facility]) 5/8/2016   • CKD (chronic kidney disease) stage 4, GFR 15-29 ml/min (Formerly [facility]) 1/15/2010    end stage renal disease   • COPD (chronic obstructive pulmonary disease) (Formerly [facility])     wears 2.5 L o2 sometimes when he sleeps   • Detached retina    • Dialysis     m,w,f davita   • EMPHYSEMA    • Gout    • Heart burn     occas   • Heart murmur     maria esther lee cardiologist   • High cholesterol    • HTN (hypertension) 1/15/2010   • Indigestion     occas   • Kidney cyst    • Leg pain, bilateral 8/10/2015   • On supplemental oxygen therapy    • Peripheral vascular disease (Formerly [facility]) 8/10/2015   • Pneumonia    • Primary insomnia 3/22/2018   • Proteinuria    • Sleep apnea     O2 PER CANULA  AT NIGHT 2l/m   • Snoring        Past Surgical History:   Procedure Laterality Date   • AV FISTULA CREATION Right 2/21/2019    Procedure: AV FISTULA CREATION - ARM;  Surgeon: David Cason M.D.;  Location: [facility];  Service: General   • FEMORAL ENDARTERECTOMY Left 1/25/2019    Procedure: FEMORAL ENDARTERECTOMY;  Surgeon: David Cason M.D.;  Location: [facility];  Service: General   • FEMORAL POPLITEAL BYPASS Left 1/25/2019    Procedure: LEFT FEMORAL POPLITEAL POLYTETRAFLUOROETHYLENE ePTFE(PROPATEN VASCULAR GRAFT) BYPASS;  Surgeon: David Cason M.D.;  Location: [facility];  Service: General   • ANGIOGRAM Left 1/25/2019    Procedure: LEFT LEG ANGIOGRAM;  Surgeon: David Cason M.D.;  Location: [facility];  Service: General   • ENDOSCOPY PROCEDURE  3/21/2018    Procedure: ENDOSCOPY PROCEDURE/UPPER;  Surgeon: Enrique Child D.O.;  Location: [facility];  Service: Gastroenterology   • COLONOSCOPY - ENDO  8/15/2016     Procedure: COLONOSCOPY - ENDO;  Surgeon: Mane Whatley M.D.;  Location: ENDOSCOPY [facility];  Service:    • GASTROSCOPY WITH BIOPSY  8/13/2016    Procedure: GASTROSCOPY WITH BIOPSY;  Surgeon: Jorge Leavitt M.D.;  Location: ENDOSCOPY [facility];  Service:    • RECOVERY  12/23/2015    Procedure: VASCULAR CASE-CASON-LEFT ARM FISTULOGRAM WITH ANGIOPLASTY;  Surgeon: Recoveryonbhavani Surgery;  Location: SURGERY PRE-POST PROC UNIT [facility];  Service:    • RECOVERY  3/24/2015    Performed by Recoveryonly Surgery at SURGERY PRE-POST PROC UNIT [facility]   • RECOVERY  7/29/2014    Performed by Ir-Recovery Surgery at SURGERY SAME DAY [facility]   • RECOVERY  3/24/2014    Performed by Ir-Recovery Surgery at SURGERY [facility]   • RECOVERY  12/17/2013    Performed by Ir-Recovery Surgery at SURGERY SAME DAY [facility]   • RECOVERY  7/2/2013    Performed by Ir-Recovery Surgery at SURGERY SAME DAY [facility]   • AV FISTULA THROMBOLYSIS  7/2/2013    Performed by David Cason M.D. at SURGERY [facility]   • RECOVERY  1/29/2013    Performed by Ir-Recovery Surgery at SURGERY SAME DAY [facility]   • RECOVERY  7/23/2012    Performed by SURGERY, IR-RECOVERY at SURGERY SAME DAY [facility]   • VITRECTOMY POSTERIOR  10/11/2011    Performed by NAHUM GE at SURGERY SAME DAY [facility]   • RECOVERY  8/12/2011    Performed by SURGERY, IR-RECOVERY at SURGERY SAME DAY [facility]   • VITRECTOMY POSTERIOR  1/18/2011    Performed by NAHUM GE at SURGERY SAME DAY [facility]   • SCLERAL BUCKLING  1/18/2011    Performed by NAHUM GE at SURGERY SAME DAY [facility]   • AV FISTULOGRAM  9/17/2010    Performed by DAVID CASON at SURGERY [facility]   • ANGIOPLASTY BALLOON  9/17/2010    Performed by DAVID CASON at SURGERY [facility]   • AV FISTULOGRAM  7/23/2010    Performed by DAVID CASON at SURGERY [facility]   • ANGIOPLASTY BALLOON  7/23/2010    Performed by BLANKA  "ALESHIA OSEI at SURGERY [facility]   • AV FISTULA REVISION  2/19/2010    Performed by WILLIE HATCH at SURGERY [facility]   • AV FISTULA CREATION  2/12/2010    Performed by ALESHIA MOSCOSO at SURGERY [facility]   • CATARACT PHACO WITH IOL  4/8/08    Performed by STACEY PHILLIPS at SURGERY SAME DAY [facility]   • OTHER ORTHOPEDIC SURGERY  1998    right toe for facititis       Family History   Problem Relation Age of Onset   • Stroke Mother    • Hypertension Mother    • Lung Disease Father         Emphysema, resp failure   • Genitourinary () Maternal Aunt         hematuria   • Hypertension Brother      Patient family history was personally reviewed, no pertinent family history to current presentation    Social History     Social History   • Marital status:      Spouse name: N/A   • Number of children: N/A   • Years of education: N/A     Occupational History   • Not on file.     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 This is reviewed in H&P and PMH. ALL OTHERS reviewed and negative    Physical exam:  Patient Vitals for the past 24 hrs:   Temp Temp src Pulse Resp SpO2 Height Weight   06/12/19 1636 - - - (!) 38 - - -   06/12/19 1621 - - - (!) 23 - - -   06/12/19 1606 - - - (!) 25 - - -   06/12/19 1439 - - 98 19 98 % - -   06/12/19 1406 - - 98 12 97 % - -   06/12/19 1346 37.2 °C (99 °F) Temporal (!) 150 18 98 % - -   06/12/19 1345 - - - - - 1.727 m (5' 8\") 76.8 kg (169 lb 5 " oz)       General: No acute distress.   EYES: no jaundice  HEENT: OP clear   Neck: No bruits No JVD.   CVS:  RRR.  In atrial flutter heart rate around 100 S1 + S2. No M/R/G  Resp: CTAB. No wheezing or crackles/rhonchi.  Abdomen: Soft, NT, ND,  Skin: Grossly nothing acute no obvious rashes  Neurological: Alert, Moves all extremities, no cranial nerve defects on limited exam  Extremities: Pulse 2+ in b/l LE. no edema. No cyanosis.  Left upper extremity fistula is working appropriately      Data:  Laboratory studies personally reviewed by me:  Recent Results (from the past 24 hour(s))   EKG    Collection Time: 19  1:39 PM   Result Value Ref Range    Report       [facility] Emergency Dept.    Test Date:  2019  Pt Name:    EDVIN GREEN               Department: [facility]  MRN:        [id number]                      Room:  Gender:     Male                         Technician: [id number]  :        1940                   Requested By:ER TRIAGE PROTOCOL  Order #:    [id number]                    Reading MD: FIOR FRANCO D.O.    Measurements  Intervals                                Axis  Rate:       150                          P:          0  HI:                                      QRS:        -19  QRSD:       124                          T:          70  QT:         360  QTc:        569    Interpretive Statements  VENTRICULAR-PACED COMPLEXES  NO FURTHER RHYTHM ANALYSIS ATTEMPTED DUE TO PACED RHYTHM  NONSPECIFIC INTRAVENTRICULAR CONDUCTION DELAY  BORDERLINE ST DEPRESSION, ANTEROLATERAL LEADS  Compared to ECG 2019 11:50:07  Intraventricular conduction delay now present  Sinus rhythm no longer present  Right bund le-branch block no longer present  ST (T wave) deviation still present    Electronically Signed On 2019 14:03:40 PDT by FIOR FRANCO D.O.     EKG    Collection Time: 19  2:17 PM   Result Value Ref Range    Report       [facility]  Emergency Dept.    Test Date:  2019  Pt Name:    EDVIN GREEN               Department: [facility]  MRN:        [id number]                      Room:       -ROOM 7  Gender:     Male                         Technician: HARI  :        1940                   Requested By:FIOR FRANCO  Order #:    [id number]                    Reading MD:    Measurements  Intervals                                Axis  Rate:       106                          P:  RI:                                      QRS:        -66  QRSD:       140                          T:          145  QT:         368  QTc:        489    Interpretive Statements  Atrial flutter with predominant 3:1 AV block  RBBB and LAFB  Inferior infarct, old  Compared to ECG 2019 13:39:56  AV block, advanced (high-grade) now present  Left anterior fascicular block now present  Right bundle-branch block now present  Myocardial infarct finding now present  Ventricular-paced complex(es) or rhythm  no longer present  Intraventricular conduction delay no longer present  ST (T wave) deviation no longer present     CBC w/ Differential    Collection Time: 19  2:44 PM   Result Value Ref Range    WBC 3.5 (L) 4.8 - 10.8 K/uL    RBC 3.16 (L) 4.70 - 6.10 M/uL    Hemoglobin 10.3 (L) 14.0 - 18.0 g/dL    Hematocrit 33.6 (L) 42.0 - 52.0 %    .3 (H) 81.4 - 97.8 fL    MCH 32.6 27.0 - 33.0 pg    MCHC 30.7 (L) 33.7 - 35.3 g/dL    RDW 61.4 (H) 35.9 - 50.0 fL    Platelet Count 180 164 - 446 K/uL    MPV 9.8 9.0 - 12.9 fL    Neutrophils-Polys 74.20 (H) 44.00 - 72.00 %    Lymphocytes 11.30 (L) 22.00 - 41.00 %    Monocytes 11.90 0.00 - 13.40 %    Eosinophils 2.00 0.00 - 6.90 %    Basophils 0.60 0.00 - 1.80 %    Immature Granulocytes 0.00 0.00 - 0.90 %    Nucleated RBC 0.00 /100 WBC    Neutrophils (Absolute) 2.62 1.82 - 7.42 K/uL    Lymphs (Absolute) 0.40 (L) 1.00 - 4.80 K/uL    Monos (Absolute) 0.42 0.00 - 0.85 K/uL    Eos (Absolute) 0.07 0.00 - 0.51 K/uL    Baso (Absolute)  0.02 0.00 - 0.12 K/uL    Immature Granulocytes (abs) 0.00 0.00 - 0.11 K/uL    NRBC (Absolute) 0.00 K/uL   Complete Metabolic Panel (CMP)    Collection Time: 06/12/19  2:44 PM   Result Value Ref Range    Sodium 140 135 - 145 mmol/L    Potassium 4.6 3.6 - 5.5 mmol/L    Chloride 97 96 - 112 mmol/L    Co2 24 20 - 33 mmol/L    Anion Gap 19.0 (H) 0.0 - 11.9    Glucose 108 (H) 65 - 99 mg/dL    Bun 92 (HH) 8 - 22 mg/dL    Creatinine 11.42 (HH) 0.50 - 1.40 mg/dL    Calcium 9.5 8.4 - 10.2 mg/dL    AST(SGOT) 14 12 - 45 U/L    ALT(SGPT) 19 2 - 50 U/L    Alkaline Phosphatase 115 (H) 30 - 99 U/L    Total Bilirubin 1.0 0.1 - 1.5 mg/dL    Albumin 3.5 3.2 - 4.9 g/dL    Total Protein 6.7 6.0 - 8.2 g/dL    Globulin 3.2 1.9 - 3.5 g/dL    A-G Ratio 1.1 g/dL   Troponin STAT    Collection Time: 06/12/19  2:44 PM   Result Value Ref Range    Troponin I 0.34 (H) 0.00 - 0.04 ng/mL   ESTIMATED GFR    Collection Time: 06/12/19  2:44 PM   Result Value Ref Range    GFR If African American 5 (A) >60 mL/min/1.73 m 2    GFR If Non African American 4 (A) >60 mL/min/1.73 m 2       Imaging:  DX-CHEST-PORTABLE (1 VIEW)   Final Result      1.  Cardiomegaly.      2.  Linear atelectasis within the right lung base.              EKG : personally reviewed by me atrial flutter unclear if it is typical or atypical    All pertinent features of laboratory and imaging reviewed including primary images where applicable      Principal Problem:    Atrial flutter (HCC) (Chronic) POA: Yes  Active Problems:    Aortic stenosis, moderate POA: Yes    End stage renal disease (HCC) POA: Yes    Chronic anticoagulation (Chronic) POA: No  Resolved Problems:    * No resolved hospital problems. *      Assessment / Plan:  Atrial flutter newly diagnosed  He is responded to Cardizem for rate control continue to monitor  We discussed available treatment options ideally we can arrange for a JUSTIN guided cardioversion tomorrow at noon if anesthesia is available if not then we can do this  as an outpatient assuming he sustains reasonable rate control then we would like to have him see electrophysiology for consideration of ablation    Given the need for anticoagulation can stop his Plavix  Agree with continue Cardizem 60 mg 4 times daily is likely the proper dose    I personally discussed his case with  Dr Eulogio Wilcox D.O.    Future Appointments  Date Time Provider Department Center   6/14/2019 10:20 AM ROLDAN Daniel   9/12/2019 2:45 PM SUPA Nice None       It is my pleasure to participate in the care of Mr. Castelan.  Please do not hesitate to contact me with questions or concerns.    Harvinder Hoang MD PhD [facility]  Cardiologist [facility]    6/12/2019    Please note that this dictation was created using voice recognition software. I have worked with consultants from the vendor as well as technical experts from [facility] to optimize the interface. I have made every reasonable attempt to correct obvious errors, but I expect that there are errors of grammar and possibly content I did not discover before finalizing the note.     Mr. Castelan's care is highly complex due to high risk diagnoses, high risk medication and discussion about complications from procedures or medications.  I have personally spent extra time in discussion about these facts with Mr. Castelan and reviewed the available records including labs, imaging and EKGs as appropriate.

## 2019-06-12 NOTE — ED TRIAGE NOTES
"Chief Complaint   Patient presents with   • Rapid Heart Beat     per reports sent by  for \"WIDE COMPLEX TACHYCARDIA ON EKG.\"   • Cough     Pt reports abnormal cough x weeks. States that he is suppose to be in dialysis today.   Pt straight back to 7A  "

## 2019-06-13 ENCOUNTER — TELEPHONE (OUTPATIENT)
Dept: CARDIOLOGY | Facility: MEDICAL CENTER | Age: 79
End: 2019-06-13

## 2019-06-13 ENCOUNTER — ANESTHESIA (OUTPATIENT)
Dept: SURGERY | Facility: MEDICAL CENTER | Age: 79
DRG: 308 | End: 2019-06-13
Payer: MEDICARE

## 2019-06-13 ENCOUNTER — APPOINTMENT (OUTPATIENT)
Dept: CARDIOLOGY | Facility: MEDICAL CENTER | Age: 79
DRG: 308 | End: 2019-06-13
Attending: INTERNAL MEDICINE
Payer: MEDICARE

## 2019-06-13 ENCOUNTER — ANESTHESIA EVENT (OUTPATIENT)
Dept: SURGERY | Facility: MEDICAL CENTER | Age: 79
DRG: 308 | End: 2019-06-13
Payer: MEDICARE

## 2019-06-13 ENCOUNTER — APPOINTMENT (OUTPATIENT)
Dept: CARDIOLOGY | Facility: MEDICAL CENTER | Age: 79
DRG: 308 | End: 2019-06-13
Attending: HOSPITALIST
Payer: MEDICARE

## 2019-06-13 DIAGNOSIS — I48.92 ATRIAL FLUTTER, UNSPECIFIED TYPE (HCC): Chronic | ICD-10-CM

## 2019-06-13 PROBLEM — E83.42 HYPOMAGNESEMIA: Status: ACTIVE | Noted: 2019-06-13

## 2019-06-13 PROBLEM — I50.22 CHF (CONGESTIVE HEART FAILURE), NYHA CLASS II, CHRONIC, SYSTOLIC (HCC): Status: ACTIVE | Noted: 2019-06-13

## 2019-06-13 LAB
ALBUMIN SERPL BCP-MCNC: 3.5 G/DL (ref 3.2–4.9)
ALBUMIN/GLOB SERPL: 1.1 G/DL
ALP SERPL-CCNC: 117 U/L (ref 30–99)
ALT SERPL-CCNC: 20 U/L (ref 2–50)
ANION GAP SERPL CALC-SCNC: 15 MMOL/L (ref 0–11.9)
AST SERPL-CCNC: 17 U/L (ref 12–45)
BASOPHILS # BLD AUTO: 0.2 % (ref 0–1.8)
BASOPHILS # BLD: 0.01 K/UL (ref 0–0.12)
BILIRUB SERPL-MCNC: 0.8 MG/DL (ref 0.1–1.5)
BUN SERPL-MCNC: 63 MG/DL (ref 8–22)
CALCIUM SERPL-MCNC: 9.3 MG/DL (ref 8.4–10.2)
CHLORIDE SERPL-SCNC: 99 MMOL/L (ref 96–112)
CO2 SERPL-SCNC: 25 MMOL/L (ref 20–33)
CREAT SERPL-MCNC: 8.74 MG/DL (ref 0.5–1.4)
EOSINOPHIL # BLD AUTO: 0.1 K/UL (ref 0–0.51)
EOSINOPHIL NFR BLD: 2.2 % (ref 0–6.9)
ERYTHROCYTE [DISTWIDTH] IN BLOOD BY AUTOMATED COUNT: 61.9 FL (ref 35.9–50)
GLOBULIN SER CALC-MCNC: 3.2 G/DL (ref 1.9–3.5)
GLUCOSE SERPL-MCNC: 108 MG/DL (ref 65–99)
HCT VFR BLD AUTO: 33.8 % (ref 42–52)
HGB BLD-MCNC: 10.4 G/DL (ref 14–18)
IMM GRANULOCYTES # BLD AUTO: 0.02 K/UL (ref 0–0.11)
IMM GRANULOCYTES NFR BLD AUTO: 0.4 % (ref 0–0.9)
INR PPP: 1.22 (ref 0.87–1.13)
LV EJECT FRACT  99904: 30
LYMPHOCYTES # BLD AUTO: 0.45 K/UL (ref 1–4.8)
LYMPHOCYTES NFR BLD: 9.8 % (ref 22–41)
MCH RBC QN AUTO: 32.8 PG (ref 27–33)
MCHC RBC AUTO-ENTMCNC: 30.8 G/DL (ref 33.7–35.3)
MCV RBC AUTO: 106.6 FL (ref 81.4–97.8)
MONOCYTES # BLD AUTO: 0.43 K/UL (ref 0–0.85)
MONOCYTES NFR BLD AUTO: 9.3 % (ref 0–13.4)
NEUTROPHILS # BLD AUTO: 3.59 K/UL (ref 1.82–7.42)
NEUTROPHILS NFR BLD: 78.1 % (ref 44–72)
NRBC # BLD AUTO: 0 K/UL
NRBC BLD-RTO: 0 /100 WBC
PLATELET # BLD AUTO: 187 K/UL (ref 164–446)
PMV BLD AUTO: 10 FL (ref 9–12.9)
POTASSIUM SERPL-SCNC: 4.3 MMOL/L (ref 3.6–5.5)
PROT SERPL-MCNC: 6.7 G/DL (ref 6–8.2)
PROTHROMBIN TIME: 15.6 SEC (ref 12–14.6)
RBC # BLD AUTO: 3.17 M/UL (ref 4.7–6.1)
SODIUM SERPL-SCNC: 139 MMOL/L (ref 135–145)
TROPONIN I SERPL-MCNC: 0.36 NG/ML (ref 0–0.04)
TROPONIN I SERPL-MCNC: 0.39 NG/ML (ref 0–0.04)
TROPONIN I SERPL-MCNC: 0.45 NG/ML (ref 0–0.04)
TROPONIN I SERPL-MCNC: 0.48 NG/ML (ref 0–0.04)
WBC # BLD AUTO: 4.6 K/UL (ref 4.8–10.8)

## 2019-06-13 PROCEDURE — A9270 NON-COVERED ITEM OR SERVICE: HCPCS | Performed by: INTERNAL MEDICINE

## 2019-06-13 PROCEDURE — 85025 COMPLETE CBC W/AUTO DIFF WBC: CPT

## 2019-06-13 PROCEDURE — 700111 HCHG RX REV CODE 636 W/ 250 OVERRIDE (IP): Performed by: HOSPITALIST

## 2019-06-13 PROCEDURE — 5A2204Z RESTORATION OF CARDIAC RHYTHM, SINGLE: ICD-10-PCS | Performed by: INTERNAL MEDICINE

## 2019-06-13 PROCEDURE — 700101 HCHG RX REV CODE 250: Performed by: EMERGENCY MEDICINE

## 2019-06-13 PROCEDURE — 92960 CARDIOVERSION ELECTRIC EXT: CPT | Performed by: INTERNAL MEDICINE

## 2019-06-13 PROCEDURE — 700111 HCHG RX REV CODE 636 W/ 250 OVERRIDE (IP)

## 2019-06-13 PROCEDURE — 700102 HCHG RX REV CODE 250 W/ 637 OVERRIDE(OP): Performed by: INTERNAL MEDICINE

## 2019-06-13 PROCEDURE — 160048 HCHG OR STATISTICAL LEVEL 1-5: Performed by: INTERNAL MEDICINE

## 2019-06-13 PROCEDURE — 94760 N-INVAS EAR/PLS OXIMETRY 1: CPT

## 2019-06-13 PROCEDURE — 700111 HCHG RX REV CODE 636 W/ 250 OVERRIDE (IP): Performed by: INTERNAL MEDICINE

## 2019-06-13 PROCEDURE — 160002 HCHG RECOVERY MINUTES (STAT): Performed by: INTERNAL MEDICINE

## 2019-06-13 PROCEDURE — 84484 ASSAY OF TROPONIN QUANT: CPT

## 2019-06-13 PROCEDURE — 99233 SBSQ HOSP IP/OBS HIGH 50: CPT | Performed by: INTERNAL MEDICINE

## 2019-06-13 PROCEDURE — 770020 HCHG ROOM/CARE - TELE (206)

## 2019-06-13 PROCEDURE — 99231 SBSQ HOSP IP/OBS SF/LOW 25: CPT | Performed by: INTERNAL MEDICINE

## 2019-06-13 PROCEDURE — 80053 COMPREHEN METABOLIC PANEL: CPT

## 2019-06-13 PROCEDURE — 700101 HCHG RX REV CODE 250: Performed by: STUDENT IN AN ORGANIZED HEALTH CARE EDUCATION/TRAINING PROGRAM

## 2019-06-13 PROCEDURE — 700105 HCHG RX REV CODE 258: Performed by: STUDENT IN AN ORGANIZED HEALTH CARE EDUCATION/TRAINING PROGRAM

## 2019-06-13 PROCEDURE — 700111 HCHG RX REV CODE 636 W/ 250 OVERRIDE (IP): Performed by: STUDENT IN AN ORGANIZED HEALTH CARE EDUCATION/TRAINING PROGRAM

## 2019-06-13 PROCEDURE — 160035 HCHG PACU - 1ST 60 MINS PHASE I: Performed by: INTERNAL MEDICINE

## 2019-06-13 PROCEDURE — 700102 HCHG RX REV CODE 250 W/ 637 OVERRIDE(OP): Performed by: HOSPITALIST

## 2019-06-13 PROCEDURE — 160009 HCHG ANES TIME/MIN: Performed by: INTERNAL MEDICINE

## 2019-06-13 PROCEDURE — 93325 DOPPLER ECHO COLOR FLOW MAPG: CPT

## 2019-06-13 PROCEDURE — 99232 SBSQ HOSP IP/OBS MODERATE 35: CPT | Mod: 25 | Performed by: INTERNAL MEDICINE

## 2019-06-13 PROCEDURE — 160036 HCHG PACU - EA ADDL 30 MINS PHASE I: Performed by: INTERNAL MEDICINE

## 2019-06-13 PROCEDURE — A9270 NON-COVERED ITEM OR SERVICE: HCPCS | Performed by: HOSPITALIST

## 2019-06-13 PROCEDURE — 700105 HCHG RX REV CODE 258: Performed by: INTERNAL MEDICINE

## 2019-06-13 PROCEDURE — 85610 PROTHROMBIN TIME: CPT

## 2019-06-13 PROCEDURE — 160029 HCHG SURGERY MINUTES - 1ST 30 MINS LEVEL 4: Performed by: INTERNAL MEDICINE

## 2019-06-13 PROCEDURE — 94640 AIRWAY INHALATION TREATMENT: CPT

## 2019-06-13 PROCEDURE — 93312 ECHO TRANSESOPHAGEAL: CPT | Mod: 26 | Performed by: INTERNAL MEDICINE

## 2019-06-13 RX ORDER — MAGNESIUM SULFATE HEPTAHYDRATE 40 MG/ML
INJECTION, SOLUTION INTRAVENOUS
Status: DISCONTINUED
Start: 2019-06-13 | End: 2019-06-13

## 2019-06-13 RX ORDER — LISINOPRIL 5 MG/1
5 TABLET ORAL 2 TIMES DAILY
Status: DISCONTINUED | OUTPATIENT
Start: 2019-06-13 | End: 2019-06-14 | Stop reason: HOSPADM

## 2019-06-13 RX ORDER — WARFARIN SODIUM 5 MG/1
5 TABLET ORAL
Status: COMPLETED | OUTPATIENT
Start: 2019-06-13 | End: 2019-06-13

## 2019-06-13 RX ORDER — DILTIAZEM HYDROCHLORIDE 5 MG/ML
INJECTION INTRAVENOUS
Status: COMPLETED
Start: 2019-06-13 | End: 2019-06-13

## 2019-06-13 RX ORDER — SODIUM CHLORIDE 9 MG/ML
1000 INJECTION, SOLUTION INTRAVENOUS
Status: DISCONTINUED | OUTPATIENT
Start: 2019-06-13 | End: 2019-06-13 | Stop reason: HOSPADM

## 2019-06-13 RX ORDER — HEPARIN SODIUM 1000 [USP'U]/ML
3200 INJECTION, SOLUTION INTRAVENOUS; SUBCUTANEOUS PRN
Status: DISCONTINUED | OUTPATIENT
Start: 2019-06-13 | End: 2019-06-13

## 2019-06-13 RX ORDER — SODIUM CHLORIDE, SODIUM LACTATE, POTASSIUM CHLORIDE, CALCIUM CHLORIDE 600; 310; 30; 20 MG/100ML; MG/100ML; MG/100ML; MG/100ML
INJECTION, SOLUTION INTRAVENOUS
Status: DISCONTINUED | OUTPATIENT
Start: 2019-06-13 | End: 2019-06-13 | Stop reason: SURG

## 2019-06-13 RX ORDER — SODIUM CHLORIDE, SODIUM LACTATE, POTASSIUM CHLORIDE, CALCIUM CHLORIDE 600; 310; 30; 20 MG/100ML; MG/100ML; MG/100ML; MG/100ML
INJECTION, SOLUTION INTRAVENOUS CONTINUOUS
Status: DISCONTINUED | OUTPATIENT
Start: 2019-06-13 | End: 2019-06-13 | Stop reason: HOSPADM

## 2019-06-13 RX ORDER — MAGNESIUM SULFATE 1 G/100ML
1 INJECTION INTRAVENOUS ONCE
Status: COMPLETED | OUTPATIENT
Start: 2019-06-13 | End: 2019-06-13

## 2019-06-13 RX ORDER — HALOPERIDOL 5 MG/ML
1 INJECTION INTRAMUSCULAR
Status: DISCONTINUED | OUTPATIENT
Start: 2019-06-13 | End: 2019-06-13 | Stop reason: HOSPADM

## 2019-06-13 RX ORDER — ONDANSETRON 2 MG/ML
4 INJECTION INTRAMUSCULAR; INTRAVENOUS
Status: DISCONTINUED | OUTPATIENT
Start: 2019-06-13 | End: 2019-06-13 | Stop reason: HOSPADM

## 2019-06-13 RX ORDER — METOPROLOL SUCCINATE 25 MG/1
50 TABLET, EXTENDED RELEASE ORAL
Status: DISCONTINUED | OUTPATIENT
Start: 2019-06-13 | End: 2019-06-14 | Stop reason: HOSPADM

## 2019-06-13 RX ADMIN — LISINOPRIL 40 MG: 20 TABLET ORAL at 00:07

## 2019-06-13 RX ADMIN — PRAVASTATIN SODIUM 20 MG: 20 TABLET ORAL at 20:17

## 2019-06-13 RX ADMIN — IPRATROPIUM BROMIDE AND ALBUTEROL SULFATE 3 ML: .5; 3 SOLUTION RESPIRATORY (INHALATION) at 19:47

## 2019-06-13 RX ADMIN — TRAZODONE HYDROCHLORIDE 300 MG: 50 TABLET ORAL at 00:05

## 2019-06-13 RX ADMIN — ROPINIROLE HYDROCHLORIDE 1 MG: 1 TABLET, FILM COATED ORAL at 21:45

## 2019-06-13 RX ADMIN — BENZONATATE 100 MG: 100 CAPSULE ORAL at 09:04

## 2019-06-13 RX ADMIN — DILTIAZEM HYDROCHLORIDE 60 MG: 30 TABLET, FILM COATED ORAL at 08:54

## 2019-06-13 RX ADMIN — ROPINIROLE HYDROCHLORIDE 1 MG: 1 TABLET, FILM COATED ORAL at 00:07

## 2019-06-13 RX ADMIN — SODIUM BICARBONATE 3 MEQ: 84 INJECTION, SOLUTION INTRAVENOUS at 14:59

## 2019-06-13 RX ADMIN — VITAMIN D, TAB 1000IU (100/BT) 1000 UNITS: 25 TAB at 08:54

## 2019-06-13 RX ADMIN — DOXAZOSIN 4 MG: 2 TABLET ORAL at 00:10

## 2019-06-13 RX ADMIN — METOPROLOL SUCCINATE 50 MG: 25 TABLET, EXTENDED RELEASE ORAL at 13:52

## 2019-06-13 RX ADMIN — PRAVASTATIN SODIUM 20 MG: 20 TABLET ORAL at 00:08

## 2019-06-13 RX ADMIN — LIDOCAINE HYDROCHLORIDE 80 MG: 20 INJECTION, SOLUTION INFILTRATION; PERINEURAL at 12:09

## 2019-06-13 RX ADMIN — PROPOFOL 20 MG: 10 INJECTION, EMULSION INTRAVENOUS at 12:11

## 2019-06-13 RX ADMIN — LISINOPRIL 5 MG: 5 TABLET ORAL at 18:21

## 2019-06-13 RX ADMIN — BENZONATATE 100 MG: 100 CAPSULE ORAL at 00:16

## 2019-06-13 RX ADMIN — ZOLPIDEM TARTRATE 10 MG: 5 TABLET ORAL at 00:10

## 2019-06-13 RX ADMIN — TAMSULOSIN HYDROCHLORIDE 0.8 MG: 0.4 CAPSULE ORAL at 08:53

## 2019-06-13 RX ADMIN — FLUTICASONE PROPIONATE 88 MCG: 44 AEROSOL, METERED RESPIRATORY (INHALATION) at 14:59

## 2019-06-13 RX ADMIN — SODIUM CHLORIDE 1000 ML: 900 INJECTION, SOLUTION INTRAVENOUS at 12:00

## 2019-06-13 RX ADMIN — WARFARIN SODIUM 5 MG: 5 TABLET ORAL at 18:17

## 2019-06-13 RX ADMIN — SODIUM CHLORIDE, POTASSIUM CHLORIDE, SODIUM LACTATE AND CALCIUM CHLORIDE: 600; 310; 30; 20 INJECTION, SOLUTION INTRAVENOUS at 12:04

## 2019-06-13 RX ADMIN — MAGNESIUM SULFATE 1 G: 1 INJECTION INTRAVENOUS at 08:56

## 2019-06-13 RX ADMIN — PROPOFOL 50 MG: 10 INJECTION, EMULSION INTRAVENOUS at 12:09

## 2019-06-13 RX ADMIN — DOXAZOSIN 4 MG: 2 TABLET ORAL at 18:21

## 2019-06-13 RX ADMIN — ZOLPIDEM TARTRATE 10 MG: 5 TABLET ORAL at 21:43

## 2019-06-13 RX ADMIN — PROPOFOL 20 MG: 10 INJECTION, EMULSION INTRAVENOUS at 12:13

## 2019-06-13 RX ADMIN — UMECLIDINIUM BROMIDE AND VILANTEROL TRIFENATATE 1 PUFF: 62.5; 25 POWDER RESPIRATORY (INHALATION) at 14:51

## 2019-06-13 RX ADMIN — DILTIAZEM HYDROCHLORIDE 10 MG: 5 INJECTION INTRAVENOUS at 01:49

## 2019-06-13 RX ADMIN — WARFARIN SODIUM 5 MG: 5 TABLET ORAL at 00:09

## 2019-06-13 RX ADMIN — BENZONATATE 100 MG: 100 CAPSULE ORAL at 18:23

## 2019-06-13 RX ADMIN — SODIUM BICARBONATE 3 MEQ: 84 INJECTION, SOLUTION INTRAVENOUS at 19:55

## 2019-06-13 RX ADMIN — CALCIUM ACETATE 2001 MG: 667 CAPSULE ORAL at 18:17

## 2019-06-13 RX ADMIN — CALCIUM ACETATE 2001 MG: 667 CAPSULE ORAL at 14:18

## 2019-06-13 RX ADMIN — FUROSEMIDE 80 MG: 40 TABLET ORAL at 18:21

## 2019-06-13 RX ADMIN — IPRATROPIUM BROMIDE AND ALBUTEROL SULFATE 3 ML: .5; 3 SOLUTION RESPIRATORY (INHALATION) at 15:07

## 2019-06-13 RX ADMIN — OMEPRAZOLE 40 MG: 20 CAPSULE, DELAYED RELEASE ORAL at 08:52

## 2019-06-13 RX ADMIN — TRAZODONE HYDROCHLORIDE 300 MG: 50 TABLET ORAL at 21:45

## 2019-06-13 ASSESSMENT — ENCOUNTER SYMPTOMS
FEVER: 0
PALPITATIONS: 0
ABDOMINAL PAIN: 0
SHORTNESS OF BREATH: 0
CHILLS: 0
MYALGIAS: 0
NAUSEA: 0
VOMITING: 0
COUGH: 0

## 2019-06-13 ASSESSMENT — PATIENT HEALTH QUESTIONNAIRE - PHQ9
SUM OF ALL RESPONSES TO PHQ9 QUESTIONS 1 AND 2: 0
1. LITTLE INTEREST OR PLEASURE IN DOING THINGS: NOT AT ALL
2. FEELING DOWN, DEPRESSED, IRRITABLE, OR HOPELESS: NOT AT ALL

## 2019-06-13 ASSESSMENT — CHA2DS2 SCORE
AGE 75 OR GREATER: YES
HYPERTENSION: YES
SEX: MALE
CHA2DS2 VASC SCORE: 5
VASCULAR DISEASE: YES
PRIOR STROKE OR TIA OR THROMBOEMBOLISM: NO
AGE 65 TO 74: NO
DIABETES: NO
CHF OR LEFT VENTRICULAR DYSFUNCTION: YES

## 2019-06-13 NOTE — PROGRESS NOTES
Report received from ROCHELLE Tapia. Patient resting comfortably in bed. Declines any needs at this time. Call light in reach.

## 2019-06-13 NOTE — PROGRESS NOTES
One cardioversion at 100J was preformed by Dr Hoang.  The patient converted from Atrial flutter to sinus rhythm

## 2019-06-13 NOTE — PROGRESS NOTES
Prior to HD, pt requested to have lidocaine pre cannulation. Called Dr Najjar for order and had to wait for it. Cannulated left forearm AVF without problem. Pt has a new fistula placed on right arm, both fistula has good thrill and bruit. Tolerated HD treatment well. Held sites for 20 minutes. See flow sheet for details.

## 2019-06-13 NOTE — PROGRESS NOTES
Spoke with Dr. Hayes, notified him the patient has no IV access. Asked us to call ED for and ROBERTO RN from ED will come up to attempt IV access for patient.

## 2019-06-13 NOTE — OR NURSING
"1218 To PACU from OR via bed,side rails up x 3 for safety, lungs clear bilaterally, scds on patient and machine operational, pt does not arouse to voice or touch. Mild snoring noted but pulse ox 97% on 6L. Bruit noted to LUE fistula. Noted SR on monitor.   1230 Pt opens eyes briefly to voice and answers RN appropriately. Denies pain or nausea.   1245 Pt awake, denies nausea or pain, anxious to go back to his room. \"How much longer do I have to stay here?\" Reviewed STOPBANG protocol and safety with patient . Pt moves all 4 extremities when instructed.   1300 Pt resting quietly; reinforced that he needs to stay in recovery x 1 hour for monitoring per STOPBANG protocol; pt states understanding. RN stated to patient that he would be transferred on the hour if he met criteria; pt thanked RN and states verbal understanding. No desaturations noted in PACU.   1315 No desaturations noted in PACU. Pt remains awake without stimulation. Pt has remained in SR on monitor.  1322 Pt denies pain or nausea. Transferred back to room on bed with CNA. Cardiac monitor replaced to patient and operational per Page, monitor tech 3rd floor.   "

## 2019-06-13 NOTE — PROGRESS NOTES
AM meds held due to strict NPO status.     Telemetry Shift Summary    Rhythm A flutter  HR Range ; highest 154  Ectopy frequent PVCs  Measurements -/.10/-        Normal Values  Rhythm SR  HR Range    Measurements 0.12-0.20 / 0.06-0.10  / 0.30-0.52

## 2019-06-13 NOTE — ANESTHESIA TIME REPORT
Anesthesia Start and Stop Event Times     Date Time Event    6/13/2019 1204 Anesthesia Start     1222 Anesthesia Stop        Responsible Staff  06/13/19    Name Role Begin End    Julio Paz M.D. Anesth 1204 1222        Preop Diagnosis (Free Text):  Pre-op Diagnosis             Preop Diagnosis (Codes):  Diagnosis Information     Diagnosis Code(s):         Post op Diagnosis  Atrial flutter (HCC)      Premium Reason  Non-Premium    Comments:

## 2019-06-13 NOTE — PROGRESS NOTES
Trop 0.48, MD paged to update, awaiting on call back. HR 98, bp 102/67. Patient declines any pain or discomfort at this time.

## 2019-06-13 NOTE — ANESTHESIA PREPROCEDURE EVALUATION
Relevant Problems   (+) AVM (arteriovenous malformation)   (+) Aortic stenosis, moderate   (+) Atrial flutter (HCC)   (+) COPD (chronic obstructive pulmonary disease) (HCC)   (+) ESRD (end stage renal disease) on dialysis (HCC)   (+) End stage renal disease (HCC)   (+) Essential hypertension   (+) Hyperkalemia, diminished renal excretion   (+) Kidney damage   (+) HELGA (obstructive sleep apnea)   (+) Renal cyst   (+) Uremia       Physical Exam    Airway   Mallampati: II  TM distance: >3 FB  Neck ROM: full       Cardiovascular - normal exam  Rhythm: regular  Rate: normal  (-) murmur     Dental - normal exam         Pulmonary - normal exam  Breath sounds clear to auscultation     Abdominal    Neurological - normal exam                 Anesthesia Plan    ASA 3   ASA physical status 3 criteria: ESRD undergoing regularly scheduled dialysis, COPD and moderate reduction of ejection fraction    Plan - MAC             Induction: intravenous    Postoperative Plan: Postoperative administration of opioids is intended.    Pertinent diagnostic labs and testing reviewed    Informed Consent:    Anesthetic plan and risks discussed with patient.    Use of blood products discussed with: patient whom consented to blood products.

## 2019-06-13 NOTE — PROGRESS NOTES
[place], ED RN placed Left EJ, patient tolerated well.     Spoke with Dr. Hoang re: procedure and heparin. Plan for procedure at 1130, ok to start heparin but will need to be saline locked prior to procedure.     Awaiting magnesium IV completion to start heparin due to patient only having one access site.     1030: Pre-Op RN up to get patient. Heparin not started yet. Pre-op RN will notify Dr. Hoang. I will notify Dr. Hayes

## 2019-06-13 NOTE — ED NOTES
Med Rec updated and complete per pt home pharmacy  Allergies have been verified with pt home pharmacy  No oral ABX within the last 30 days  Pt Home Pharmacy:[facility]

## 2019-06-13 NOTE — FLOWSHEET NOTE
06/13/19 1500   Events/Summary/Plan   Events/Summary/Plan svn and MDI   Interdisciplinary Plan of Care-Goals (Indications)   Bronchodilator Indications History / Diagnosis   Interdisciplinary Plan of Care-Outcomes    Bronchodilator Outcome Patient at Stable Baseline   Education   Education Yes - Pt. / Family has been Instructed in use of Respiratory Medications and Adverse Reactions   RT Assessment of Delivered Medications   Evaluation of Medication Delivery Daily Yes-- Pt /Family has been Instructed in use of Respiratory Medications and Adverse Reactions   SVN Group   #SVN Performed Yes   Given By: Mouthpiece   Date SVN Next Change Due (Q 7 Days) 06/19/19   Chest Exam   Respiration 20   Pulse 80   Breath Sounds   RUL Breath Sounds Clear   RML Breath Sounds Clear   RLL Breath Sounds Crackles   SILVANA Breath Sounds Clear   LLL Breath Sounds Crackles   Secretions   Cough Moist   Oximetry   #Pulse Oximetry (Single Determination) Yes   Oxygen   Pulse Oximetry 98 %   O2 (LPM) 0   O2 Daily Delivery Respiratory  Room Air with O2 Available

## 2019-06-13 NOTE — PROGRESS NOTES
Patient resting in bed, patient is restless but denies any pain or discomfort. HR is 122-145, medicated with prn diltiazem, see MAR. Safety precautions in place.

## 2019-06-13 NOTE — PROGRESS NOTES
2350 Spoke to Dr. Mckinnon, patient is to be NPO for cardioversion. Updated MD that patient's HR has been from 100s-150s but not sustaining, prn orders entered. PRN tessalon ordered by MD. Updated MD on patient receiving medications late due to dialysis.     0000 Medicated per MAR, patient informed of NPO status for cardioversion.     0138 Informed by ROB Fierro, patient has been sustaining HR between 130s-150s.

## 2019-06-13 NOTE — PROGRESS NOTES
Telemetry Shift Summary    Rhythm Aflutter  HR Range 90s-120s  Ectopy Freq PVCs  Measurements -/.12/-    Down to JUSTIN/Cardioversion at 1030. Cardioverted, patient back in SR        Normal Values  Rhythm SR  HR Range    Measurements 0.12-0.20 / 0.06-0.10  / 0.30-0.52

## 2019-06-13 NOTE — ASSESSMENT & PLAN NOTE
Dialysis Monday Wednesday Friday  Monitor electrolytes and hemoglobin  Nephrology is consulted  -appears to be tolerating well, monitor electrolytes

## 2019-06-13 NOTE — PROGRESS NOTES
Mag and heparin drip ordered by Dr. Monreal. Patient's PIV infiltrated. Patient has bilateral fistulas which are both being used at this time. Spoke to Charge ICU RN Aurora, who will attempt to place another PIV on his hand. Patient stated fistula on the right hand was placed about 8 days ago and nephrologist stated that arm cannot be used.

## 2019-06-13 NOTE — ASSESSMENT & PLAN NOTE
-s/p DCCV, holding on tele last night  -concurrent EF 30%  -start toprol XL  -continue coumadin  -iHD to maintain euvolemia

## 2019-06-13 NOTE — PROGRESS NOTES
Patient's brother Doe called for updates, patient gave permission for plan of care to be discussed.

## 2019-06-13 NOTE — PROGRESS NOTES
"Dr. Mckinnon for clarification of diet order for JUSTIN with cardioversion in the morning, if patient needs another troponin ordered, and for request of medication for cough.     Dialysis RN still at bedside. Patient states he is feeling \"okay\" just tired. Medications to be given after dialysis is over in 20 minutes.   "

## 2019-06-13 NOTE — H&P
Hospital Medicine History & Physical Note    Date of Service  6/13/2019    Primary Care Physician  Martha Light M.D.    Consultants  Cardiology    Code Status  Full    Chief Complaint  Shortness of breath    History of Presenting Illness  78 y.o. male who presented 6/12/2019 with past medical history of end-stage renal disease dialysis Monday Wednesday Friday, history of COPD, history of sleep apnea, history of pulmonary hypertension, history of moderate aortic stenosis comes in the hospital for new onset atrial flutter with RVR.  Patient states that he has no symptoms of chest pain, palpitation, lightheaded, dizziness, fever.  He does states that he has shortness of breath but not on exertion.  He did miss his dialysis session today because his heart rate was too fast.  He arrived to the emergency room with heart rate of about 150.  His blood pressure was within normal limits.  Treated with IV Cardizem and placed on warfarin.  Cardiology was consulted.    EKG interpreted by me found atrial tachycardia, left axis deviation, right bundle branch block, PVCs, ST depressions in lateral leads  After administration of Cardizem, EKG was done interpreted by me found atrial flutter 31 block, left axis deviation, right bundle branch block, ST depressions in lateral leads  Chest x-ray interpreted by me found cardiomegaly with increased intravascular congestion    Review of Systems  Review of Systems   Constitutional: Negative for chills, diaphoresis, fever and malaise/fatigue.   HENT: Negative for congestion, ear discharge, ear pain, hearing loss, nosebleeds, sinus pain, sore throat and tinnitus.    Eyes: Negative for blurred vision, double vision, photophobia and pain.   Respiratory: Positive for shortness of breath. Negative for cough, hemoptysis, sputum production, wheezing and stridor.    Cardiovascular: Negative for chest pain, palpitations, orthopnea, claudication, leg swelling and PND.   Gastrointestinal: Negative  for abdominal pain, blood in stool, constipation, diarrhea, heartburn, melena, nausea and vomiting.   Genitourinary: Negative for dysuria, flank pain, frequency, hematuria and urgency.   Musculoskeletal: Negative for back pain, falls, joint pain, myalgias and neck pain.   Skin: Negative for itching and rash.   Neurological: Negative for dizziness, tingling, tremors, weakness and headaches.   Endo/Heme/Allergies: Negative for environmental allergies and polydipsia. Does not bruise/bleed easily.   Psychiatric/Behavioral: Negative for depression, hallucinations, substance abuse and suicidal ideas.       Past Medical History   has a past medical history of Anesthesia; Arterial leg ulcer ([facility]) (11/8/2018); Atrial flutter ([facility]) (6/12/2019); Basal cell carcinoma of left cheek (3/26/2015); BPH (7/14/2009); Bronchitis (12/25/2018); CAD (coronary artery disease) (2/20/2014); CATARACT; Chronic anticoagulation (6/12/2019); Chronic respiratory failure with hypoxia ([facility]) (5/8/2016); CKD (chronic kidney disease) stage 4, GFR 15-29 ml/min ([facility]) (1/15/2010); COPD (chronic obstructive pulmonary disease) ([facility]); Detached retina; Dialysis; EMPHYSEMA; Gout; Heart burn; Heart murmur; High cholesterol; HTN (hypertension) (1/15/2010); Indigestion; Kidney cyst; Leg pain, bilateral (8/10/2015); On supplemental oxygen therapy; Peripheral vascular disease ([facility]) (8/10/2015); Pneumonia; Primary insomnia (3/22/2018); Proteinuria; Sleep apnea; and Snoring.    Surgical History   has a past surgical history that includes cataract phaco with iol (4/8/08); av fistula creation (2/12/2010); av fistula revision (2/19/2010); av fistulogram (7/23/2010); angioplasty balloon (7/23/2010); av fistulogram (9/17/2010); angioplasty balloon (9/17/2010); vitrectomy posterior (1/18/2011); scleral buckling (1/18/2011); recovery (8/12/2011); vitrectomy posterior (10/11/2011); recovery (7/23/2012); recovery (1/29/2013); recovery (7/2/2013); av fistula thrombolysis  (7/2/2013); recovery (12/17/2013); recovery (3/24/2014); recovery (7/29/2014); recovery (3/24/2015); recovery (12/23/2015); gastroscopy with biopsy (8/13/2016); colonoscopy - endo (8/15/2016); endoscopy procedure (3/21/2018); femoral endarterectomy (Left, 1/25/2019); femoral popliteal bypass (Left, 1/25/2019); angiogram (Left, 1/25/2019); other orthopedic surgery (1998); and av fistula creation (Right, 2/21/2019).     Family History  family history includes Genitourinary () in his maternal aunt; Hypertension in his brother and mother; Lung Disease in his father; Stroke in his mother.     Social History   reports that he quit smoking about 10 years ago. His smoking use included Cigarettes. He has a 40.00 pack-year smoking history. He has never used smokeless tobacco. He reports that he does not drink alcohol or use drugs.    Allergies  No Known Allergies    Medications  Prior to Admission Medications   Prescriptions Last Dose Informant Patient Reported? Taking?   Cholecalciferol (VITAMIN D PO) unk at [facility] Patient's Home Pharmacy Yes No   Sig: Take 1 Tab by mouth every day.   Fluticasone-Umeclidin-Vilant (TRELEGY ELLIPTA) 100-62.5-25 MCG/INH AEROSOL POWDER, BREATH ACTIVATED unk at [facility] Patient's Home Pharmacy No No   Sig: Inhale 1 Puff by mouth every day. Rinse mouth after use   MethylPREDNISolone (MEDROL DOSEPAK) 4 MG Tablet Therapy Pack unk at [facility] Patient's Home Pharmacy No No   Sig: Take as directed.   ROPINIRole (REQUIP) 0.5 MG Tab unk at [facility] Patient's Home Pharmacy Yes No   Sig: Take 1 mg by mouth every bedtime.   albuterol (PROAIR HFA) 108 (90 Base) MCG/ACT Aero Soln inhalation aerosol unk at [facility] Patient's Home Pharmacy No No   Sig: Inhale 2 Puffs by mouth every four hours as needed for Shortness of Breath (wheezing).   calcium acetate (PHOS-LO) 667 MG Cap unk at [facility] Patient's Home Pharmacy Yes Yes   Sig: Take 2,001 mg by mouth 3 times a day, with meals. 2001mg three times a day with meals and 1334mg with snacks    doxazosin (CARDURA) 4 MG Tab unk at [facility] Patient's Home Pharmacy Yes No   Sig: Take 4 mg by mouth every evening.   fluticasone (FLONASE) 50 MCG/ACT nasal spray unk at [facility] Patient's Home Pharmacy Yes No   Sig: [place] 2 Sprays in nose every day.   furosemide (LASIX) 80 MG Tab unk at [facility] Patient's Home Pharmacy Yes No   Sig: Take 80 mg by mouth 2 Times a Day.   levalbuterol (XOPENEX) 1.25 MG/3ML Nebu Soln unk at [facility] Patient's Home Pharmacy No No   Sig: 3 mL by Nebulization route every four hours as needed for Shortness of Breath.   lisinopril (PRINIVIL, ZESTRIL) 40 MG tablet unk at [facility] Patient's Home Pharmacy Yes Yes   Sig: Take 40 mg by mouth 2 Times a Day.   omeprazole (PRILOSEC) 40 MG delayed-release capsule unk at [facility] Patient's Home Pharmacy Yes Yes   Sig: Take 40 mg by mouth every morning.   pravastatin (PRAVACHOL) 20 MG Tab unk at [facility] Patient's Home Pharmacy Yes No   Sig: Take 20 mg by mouth every evening.   tamsulosin (FLOMAX) 0.4 MG capsule unk at [facility] Patient's Home Pharmacy Yes No   Sig: Take 0.8 mg by mouth every morning.   traZODone (DESYREL) 150 MG Tab unk at [facility] Patient's Home Pharmacy Yes Yes   Sig: Take 300 mg by mouth every evening.   zolpidem (AMBIEN) 10 MG Tab unk at [facility] Patient's Home Pharmacy Yes Yes   Sig: Take 10 mg by mouth every bedtime.      Facility-Administered Medications: None       Physical Exam  Temp:  [36.3 °C (97.3 °F)-37.2 °C (99 °F)] 36.6 °C (97.8 °F)  Pulse:  [] 65  Resp:  [12-38] 18  BP: (101-159)/(58-64) 159/64  SpO2:  [95 %-98 %] 95 %    Physical Exam   Constitutional: He is oriented to person, place, and time. He appears well-developed and well-nourished.   HENT:   Head: Normocephalic and atraumatic.   Mouth/Throat: No oropharyngeal exudate.   Eyes: Conjunctivae are normal. No scleral icterus.   Neck: Normal range of motion. Neck supple. JVD present. No tracheal deviation present. No thyromegaly present.   Cardiovascular: Normal rate, regular rhythm and intact distal  pulses.  Exam reveals no gallop and no friction rub.    No murmur heard.  Pulmonary/Chest: Effort normal. No stridor. No respiratory distress. He has wheezes. He has rales. He exhibits no tenderness.   Abdominal: Soft. Bowel sounds are normal. He exhibits no distension and no mass. There is no tenderness. There is no rebound and no guarding.   Musculoskeletal: Normal range of motion. He exhibits edema.   Thrill felt left forearm  Thrill felt on right forearm   Lymphadenopathy:     He has no cervical adenopathy.   Neurological: He is alert and oriented to person, place, and time. He has normal reflexes.   Nursing note and vitals reviewed.      Laboratory:  Recent Labs      06/12/19   1444   WBC  3.5*   RBC  3.16*   HEMOGLOBIN  10.3*   HEMATOCRIT  33.6*   MCV  106.3*   MCH  32.6   MCHC  30.7*   RDW  61.4*   PLATELETCT  180   MPV  9.8     Recent Labs      06/12/19   1444   SODIUM  140   POTASSIUM  4.6   CHLORIDE  97   CO2  24   GLUCOSE  108*   BUN  92*   CREATININE  11.42*   CALCIUM  9.5     Recent Labs      06/12/19   1444   ALTSGPT  19   ASTSGOT  14   ALKPHOSPHAT  115*   TBILIRUBIN  1.0   GLUCOSE  108*     Recent Labs      06/12/19   1444   INR  1.12             Recent Labs      06/12/19   1444  06/13/19   0027   TROPONINI  0.34*  0.48*       Urinalysis:    No results found     Imaging:  DX-CHEST-PORTABLE (1 VIEW)   Final Result      1.  Cardiomegaly.      2.  Linear atelectasis within the right lung base.      CL-CARDIOVERSION    (Results Pending)   EC-JUSTIN W/O CONT    (Results Pending)   EC-ECHOCARDIOGRAM COMPLETE W/ CONT    (Results Pending)         Assessment/Plan:  I anticipate this patient will require at least two midnights for appropriate medical management, necessitating inpatient admission.    * Atrial flutter (HCC)- (present on admission)   Assessment & Plan    Goal rate 60-90.   Pulse: 65 presently  [id number] score at least 4; estimated annual risk of stroke at least 4.8%  Anticoagulation with Coumadin in  setting of esrd  Consulted cardiology- JUSTIN cardioversion  NPO at midnight  Patient received IV Cardizem in the ER and was placed on oral Cardizem      Aortic stenosis, moderate- (present on admission)   Assessment & Plan    Further evaluate by cardiac echo  Narrow volume status     Pericardial effusion without cardiac tamponade- (present on admission)   Assessment & Plan    Seen on previous echo  Repeat echo     Hypomagnesemia   Assessment & Plan    Keep > 2   Replaced      Chronic anticoagulation   Assessment & Plan    Will be placed on long-term warfarin setting of end-stage renal disease.  Will need to be bridged over and start heparin GTT. Monitor PTT levels and adjust dose accordingly     Pulmonary hypertension (HCC)- (present on admission)   Assessment & Plan    Monitor volume status      Anemia in CKD (chronic kidney disease)- (present on admission)   Assessment & Plan    Check iron panel and FOBT     End stage renal disease (HCC)- (present on admission)   Assessment & Plan    Dialysis Monday Wednesday Friday  Monitor electrolytes and hemoglobin  Nephrology is consulted     COPD (chronic obstructive pulmonary disease) (HCC)- (present on admission)   Assessment & Plan    Not in exacerbation  Albuterol inhaler PRN     HELGA (obstructive sleep apnea)- (present on admission)   Assessment & Plan    Cpap  Has pulmonary htn     BPH (benign prostatic hypertrophy)- (present on admission)   Assessment & Plan    Doxazosin and flomax          VTE prophylaxis: Heparin

## 2019-06-13 NOTE — PROGRESS NOTES
"[facility] Cardiology Follow-up Consult Note  Date of note:    6/13/2019          Patient ID:  Name:   Parmjit Castelan     YOB: 1940  Age:   78 y.o.  male   MRN:   [id number]    Chief Complaint   Patient presents with   • Rapid Heart Beat     per reports sent by  for \"WIDE COMPLEX TACHYCARDIA ON EKG.\"   • Cough       Interim Events:  Patient tolerated medications well diltiazem achieved reasonable rate control overnight currently in about 4-1 flutter in the PPU and post cardioversion was in sinus to sinus bradycardia    There was difficulty getting IV he has a right EJ he had dialysis last evening      ROS  No NV, No Bleeding, No dizziness   All other review of systems reviewed and negative.    Past medical, surgical, social, and family history reviewed and unchanged from admission except as noted in assessment and plan.    Medications: Reviewed in MAR  Current Facility-Administered Medications   Medication Dose Frequency Provider Last Rate Last Dose   • [MAR Hold] heparin injection 3,200 Units  3,200 Units PRN Max Monreal M.D.        And   • [MAR Hold] heparin infusion 25,000 units in 500 ml 0.45% nacl   Continuous Max Monreal M.D.   Stopped at 06/13/19 0345   • [MAR Hold] lisinopril (PRINIVIL) tablet 5 mg  5 mg BID Ventura Hayes M.D.       • [MAR Hold] sodium bicarbonate 8.4 % injection 3 mEq  3 mEq Q6HRS (RT) Ventura Hayes M.D.       • lidocaine (XYLOCAINE) 1 % injection 0.5 mL  0.5 mL Once PRN Harvinder Hoang M.D.       • NS infusion 1,000 mL  1,000 mL Pre-Op Once Harvinder Hoang M.D.       • metoprolol SR (TOPROL XL) tablet 50 mg  50 mg Q DAY Harvinder Hoang M.D.       • [MAR Hold] ipratropium-albuterol (DUONEB) nebulizer solution  3 mL Q4H PRN (RT) Eulogio Wilcox D.O.       • [MAR Hold] MD Alert...Warfarin per Pharmacy   PHARMACY TO DOSE Harvinder Hoang M.D.       • [MAR Hold] senna-docusate (PERICOLACE or SENOKOT S) 8.6-50 MG per tablet 2 Tab  2 Tab BID Hamad " ROLDAN Monreal   Stopped at 06/13/19 0600    And   • [MAR Hold] polyethylene glycol/lytes (MIRALAX) PACKET 1 Packet  1 Packet QDAY PRN Max Monreal M.D.        And   • [MAR Hold] magnesium hydroxide (MILK OF MAGNESIA) suspension 30 mL  30 mL QDAY PRN aMx Monreal M.D.        And   • [MAR Hold] bisacodyl (DULCOLAX) suppository 10 mg  10 mg QDAY PRN Max Monreal M.D.       • [MAR Hold] acetaminophen (TYLENOL) tablet 650 mg  650 mg Q6HRS PRN Max Monreal M.D.       • [MAR Hold] ondansetron (ZOFRAN) syringe/vial injection 4 mg  4 mg Q4HRS PRN Max Monreal M.D.       • [MAR Hold] ondansetron (ZOFRAN ODT) dispertab 4 mg  4 mg Q4HRS PRN Max Monreal M.D.       • [MAR Hold] albuterol inhaler 2 Puff  2 Puff Q4HRS PRN Mxa Monreal M.D.       • [MAR Hold] calcium acetate (PHOS-LO) capsule 2,001 mg  2,001 mg TID WITH MEALS Max Monreal M.D.   Stopped at 06/13/19 0730   • [MAR Hold] vitamin D (cholecalciferol) tablet 1,000 Units  1,000 Units DAILY Max Monreal M.D.   1,000 Units at 06/13/19 0854   • [MAR Hold] doxazosin (CARDURA) tablet 4 mg  4 mg Q EVENING Max Monreal M.D.   4 mg at 06/13/19 0010   • [MAR Hold] fluticasone (FLONASE) nasal spray 100 mcg  2 Spray DAILY Max Monreal M.D.   Stopped at 06/13/19 0600   • [MAR Hold] furosemide (LASIX) tablet 80 mg  80 mg BID Max Monreal M.D.   Stopped at 06/12/19 1800   • [MAR Hold] omeprazole (PRILOSEC) capsule 40 mg  40 mg QAM Max Monreal M.D.   40 mg at 06/13/19 0852   • [MAR Hold] pravastatin (PRAVACHOL) tablet 20 mg  20 mg Nightly Max Monreal M.D.   20 mg at 06/13/19 0008   • [MAR Hold] ROPINIRole (REQUIP) tablet 1 mg  1 mg QHS CRISTOPHER LindseyDLise   1 mg at 06/13/19 0007   • [MAR Hold] tamsulosin (FLOMAX) capsule 0.8 mg  0.8 mg QAM Max Monreal M.D.   0.8 mg at 06/13/19 0853   • [MAR Hold] traZODone (DESYREL) tablet 300 mg  300 mg Nightly Max Monreal M.D.   300 mg at 06/13/19 0005   • [MAR Hold] zolpidem (AMBIEN) tablet 10 mg  10 mg QHS Max Monreal M.D.   10  "mg at 19 0010   • [MAR Hold] umeclidinium-vilanterol (ANORO ELLIPTA) inhaler 1 Puff  1 Puff QDAILY (RT) Max Monreal M.D.        And   • [MAR Hold] fluticasone (FLOVENT HFA) 44 MCG/ACT inhaler 88 mcg  2 Puff QDAILY (RT) Max Monreal M.D.       • [MAR Hold] benzonatate (TESSALON) capsule 100 mg  100 mg TID PRN ANU NamOLise   100 mg at 19 0904   • [MAR Hold] DILTIAZem (CARDIZEM) injection 10 mg  10 mg Q3HRS PRN ANU NamO.   10 mg at 19 0149     Last reviewed on 2019 10:46 AM by Jyotsna Grewal R.N.  No Known Allergies    Physical Exam  Body mass index is 25.81 kg/m². /71   Pulse 85   Temp 36.1 °C (97 °F) (Temporal)   Resp 16   Ht 1.727 m (5' 8\")   Wt 77 kg (169 lb 12.1 oz)   SpO2 96%  Vitals:    19 0854 19 0856 19 1040 19 1110   BP: 132/87 132/87  119/71   Pulse: (!) 120  85    Resp:    16   Temp:    36.1 °C (97 °F)   TempSrc:    Temporal   SpO2:    96%   Weight:    77 kg (169 lb 12.1 oz)   Height:    1.727 m (5' 8\")    Oxygen Therapy:  Pulse Oximetry: 96 %, O2 (LPM): 0, O2 Delivery: None (Room Air)    General: no apparent distress  Eyes: nl conjunctiva  ENT: OP clear  Neck: no JVD   Lungs: normal respiratory effort, CTAB  Heart: RRR, no murmurs, no rubs or gallops,   EXT no edema bilateral lower extremities. + pedal pulses. no cyanosisFistula has bandaging  Abdomen: soft, non tender, non distended,  Neurological: No focal deficits  Psychiatric: Appropriate affect,   Skin: Warm extremities    Labs (personally reviewed and notable for):   Recent Results (from the past 24 hour(s))   EKG    Collection Time: 19  1:39 PM   Result Value Ref Range    Report       [facility] Emergency Dept.    Test Date:  2019  Pt Name:    EDVIN PETER               Department: [facility]  MRN:        [id number]                      Room:  Gender:     Male                         Technician: [id number]  :        1940              "      Requested By:ER TRIAGE PROTOCOL  Order #:    [id number]                    Reading MD: FIOR FRANCO D.O.    Measurements  Intervals                                Axis  Rate:       150                          P:          0  TX:                                      QRS:        -19  QRSD:       124                          T:          70  QT:         360  QTc:        569    Interpretive Statements  VENTRICULAR-PACED COMPLEXES  NO FURTHER RHYTHM ANALYSIS ATTEMPTED DUE TO PACED RHYTHM  NONSPECIFIC INTRAVENTRICULAR CONDUCTION DELAY  BORDERLINE ST DEPRESSION, ANTEROLATERAL LEADS  Compared to ECG 2019 11:50:07  Intraventricular conduction delay now present  Sinus rhythm no longer present  Right bund le-branch block no longer present  ST (T wave) deviation still present    Electronically Signed On 2019 14:03:40 PDT by FIOR FRANCO D.O.     EKG    Collection Time: 19  2:17 PM   Result Value Ref Range    Report       [facility] Emergency Dept.    Test Date:  2019  Pt Name:    EDVIN GREEN               Department[facility]  MRN:        [id number]                      [facility]   Gender:     Male                         Technician: HARI  :        1940                   Requested By:FIOR FRANCO  Order #:    [id number]                    Reading MD:    Measurements  Intervals                                Axis  Rate:       106                          P:  TX:                                      QRS:        -66  QRSD:       140                          T:          145  QT:         368  QTc:        489    Interpretive Statements  Atrial flutter with predominant 3:1 AV block  RBBB and LAFB  Inferior infarct, old  Compared to ECG 2019 13:39:56  AV block, advanced (high-grade) now present  Left anterior fascicular block now present  Right bundle-branch block now present  Myocardial infarct finding now present  Ventricular-paced complex(es) or  rhythm  no longer present  Intraventricular conduction delay no longer present  ST (T wave) deviation no longer present     CBC w/ Differential    Collection Time: 06/12/19  2:44 PM   Result Value Ref Range    WBC 3.5 (L) 4.8 - 10.8 K/uL    RBC 3.16 (L) 4.70 - 6.10 M/uL    Hemoglobin 10.3 (L) 14.0 - 18.0 g/dL    Hematocrit 33.6 (L) 42.0 - 52.0 %    .3 (H) 81.4 - 97.8 fL    MCH 32.6 27.0 - 33.0 pg    MCHC 30.7 (L) 33.7 - 35.3 g/dL    RDW 61.4 (H) 35.9 - 50.0 fL    Platelet Count 180 164 - 446 K/uL    MPV 9.8 9.0 - 12.9 fL    Neutrophils-Polys 74.20 (H) 44.00 - 72.00 %    Lymphocytes 11.30 (L) 22.00 - 41.00 %    Monocytes 11.90 0.00 - 13.40 %    Eosinophils 2.00 0.00 - 6.90 %    Basophils 0.60 0.00 - 1.80 %    Immature Granulocytes 0.00 0.00 - 0.90 %    Nucleated RBC 0.00 /100 WBC    Neutrophils (Absolute) 2.62 1.82 - 7.42 K/uL    Lymphs (Absolute) 0.40 (L) 1.00 - 4.80 K/uL    Monos (Absolute) 0.42 0.00 - 0.85 K/uL    Eos (Absolute) 0.07 0.00 - 0.51 K/uL    Baso (Absolute) 0.02 0.00 - 0.12 K/uL    Immature Granulocytes (abs) 0.00 0.00 - 0.11 K/uL    NRBC (Absolute) 0.00 K/uL   Complete Metabolic Panel (CMP)    Collection Time: 06/12/19  2:44 PM   Result Value Ref Range    Sodium 140 135 - 145 mmol/L    Potassium 4.6 3.6 - 5.5 mmol/L    Chloride 97 96 - 112 mmol/L    Co2 24 20 - 33 mmol/L    Anion Gap 19.0 (H) 0.0 - 11.9    Glucose 108 (H) 65 - 99 mg/dL    Bun 92 (HH) 8 - 22 mg/dL    Creatinine 11.42 (HH) 0.50 - 1.40 mg/dL    Calcium 9.5 8.4 - 10.2 mg/dL    AST(SGOT) 14 12 - 45 U/L    ALT(SGPT) 19 2 - 50 U/L    Alkaline Phosphatase 115 (H) 30 - 99 U/L    Total Bilirubin 1.0 0.1 - 1.5 mg/dL    Albumin 3.5 3.2 - 4.9 g/dL    Total Protein 6.7 6.0 - 8.2 g/dL    Globulin 3.2 1.9 - 3.5 g/dL    A-G Ratio 1.1 g/dL   Troponin STAT    Collection Time: 06/12/19  2:44 PM   Result Value Ref Range    Troponin I 0.34 (H) 0.00 - 0.04 ng/mL   ESTIMATED GFR    Collection Time: 06/12/19  2:44 PM   Result Value Ref Range    GFR If   5 (A) >60 mL/min/1.73 m 2    GFR If Non African American 4 (A) >60 mL/min/1.73 m 2   PROTHROMBIN TIME    Collection Time: 06/12/19  2:44 PM   Result Value Ref Range    PT 14.6 12.0 - 14.6 sec    INR 1.12 0.87 - 1.13   MAGNESIUM    Collection Time: 06/12/19  6:07 PM   Result Value Ref Range    Magnesium 1.7 1.5 - 2.5 mg/dL   TROPONIN    Collection Time: 06/13/19 12:27 AM   Result Value Ref Range    Troponin I 0.48 (H) 0.00 - 0.04 ng/mL   TROPONIN    Collection Time: 06/13/19  4:19 AM   Result Value Ref Range    Troponin I 0.45 (H) 0.00 - 0.04 ng/mL   CBC WITH DIFFERENTIAL    Collection Time: 06/13/19  4:19 AM   Result Value Ref Range    WBC 4.6 (L) 4.8 - 10.8 K/uL    RBC 3.17 (L) 4.70 - 6.10 M/uL    Hemoglobin 10.4 (L) 14.0 - 18.0 g/dL    Hematocrit 33.8 (L) 42.0 - 52.0 %    .6 (H) 81.4 - 97.8 fL    MCH 32.8 27.0 - 33.0 pg    MCHC 30.8 (L) 33.7 - 35.3 g/dL    RDW 61.9 (H) 35.9 - 50.0 fL    Platelet Count 187 164 - 446 K/uL    MPV 10.0 9.0 - 12.9 fL    Neutrophils-Polys 78.10 (H) 44.00 - 72.00 %    Lymphocytes 9.80 (L) 22.00 - 41.00 %    Monocytes 9.30 0.00 - 13.40 %    Eosinophils 2.20 0.00 - 6.90 %    Basophils 0.20 0.00 - 1.80 %    Immature Granulocytes 0.40 0.00 - 0.90 %    Nucleated RBC 0.00 /100 WBC    Neutrophils (Absolute) 3.59 1.82 - 7.42 K/uL    Lymphs (Absolute) 0.45 (L) 1.00 - 4.80 K/uL    Monos (Absolute) 0.43 0.00 - 0.85 K/uL    Eos (Absolute) 0.10 0.00 - 0.51 K/uL    Baso (Absolute) 0.01 0.00 - 0.12 K/uL    Immature Granulocytes (abs) 0.02 0.00 - 0.11 K/uL    NRBC (Absolute) 0.00 K/uL   Comp Metabolic Panel    Collection Time: 06/13/19  4:19 AM   Result Value Ref Range    Sodium 139 135 - 145 mmol/L    Potassium 4.3 3.6 - 5.5 mmol/L    Chloride 99 96 - 112 mmol/L    Co2 25 20 - 33 mmol/L    Anion Gap 15.0 (H) 0.0 - 11.9    Glucose 108 (H) 65 - 99 mg/dL    Bun 63 (H) 8 - 22 mg/dL    Creatinine 8.74 (HH) 0.50 - 1.40 mg/dL    Calcium 9.3 8.4 - 10.2 mg/dL    AST(SGOT) 17 12 - 45 U/L     ALT(SGPT) 20 2 - 50 U/L    Alkaline Phosphatase 117 (H) 30 - 99 U/L    Total Bilirubin 0.8 0.1 - 1.5 mg/dL    Albumin 3.5 3.2 - 4.9 g/dL    Total Protein 6.7 6.0 - 8.2 g/dL    Globulin 3.2 1.9 - 3.5 g/dL    A-G Ratio 1.1 g/dL   ESTIMATED GFR    Collection Time: 06/13/19  4:19 AM   Result Value Ref Range    GFR If  7 (A) >60 mL/min/1.73 m 2    GFR If Non African American 6 (A) >60 mL/min/1.73 m 2   TROPONIN    Collection Time: 06/13/19  8:31 AM   Result Value Ref Range    Troponin I 0.39 (H) 0.00 - 0.04 ng/mL       Cardiac Imaging and Procedures Review:    EKG and telemetry tracings personally reviewed    JUSTIN shows reduced EF moderate pericardial effusion stable valvular disease but no left atrial thrombus    Impression and Medical Decision Making:  Principal Problem:    Atrial flutter (HCC) (Chronic) POA: Yes  Active Problems:    Pericardial effusion without cardiac tamponade POA: Yes    Aortic stenosis, moderate POA: Yes    BPH (benign prostatic hypertrophy) POA: Yes      Overview: ICD-10 transition    HELGA (obstructive sleep apnea) POA: Yes    COPD (chronic obstructive pulmonary disease) (HCC) POA: Yes    End stage renal disease (HCC) POA: Yes    Anemia in CKD (chronic kidney disease) POA: Yes    Pulmonary hypertension (HCC) POA: Yes    Chronic anticoagulation (Chronic) POA: No    Hypomagnesemia POA: Unknown    CHF (congestive heart failure), NYHA class II, chronic, systolic (HCC) E F30 in setting of atrial flutter POA: Yes  Resolved Problems:    * No resolved hospital problems. *    Congestive heart failure with reduced ejection fraction not acutely decompensated but newly diagnosed after previous diagnosis  We will stop the diltiazem and switch to metoprolol XL  Rhythm control should help we will get him in with EP service on discharge    Atrial flutter  Status post cardioversion  Coumadin does not need bridging    Safe for discharge this afternoon close follow-up has been arranged    I  personally discussed his case with  Dr Ventura Hayes M.D.    Future Appointments  Date Time Provider Department Center   6/14/2019 10:20 AM Martha Light M.D. [facility]   9/12/2019 2:45 PM [facility]       It is my pleasure to participate in the care of Mr. Castelan.  Please do not hesitate to contact me with questions or concerns.    Harvinder Hoang MD PhD [facility]  Cardiologist [facility]    6/13/2019    Please note that this dictation was created using voice recognition software. I have worked with consultants from the vendor as well as technical experts from [facility] to optimize the interface. I have made every reasonable attempt to correct obvious errors, but I expect that there are errors of grammar and possibly content I did not discover before finalizing the note.     The note reflects the services are for congestive heart failure which was diagnosed today with results of the testing and not related to atrial flutter which are separate services provided today

## 2019-06-13 NOTE — PROGRESS NOTES
Inpatient Anticoagulation Service Note    Date: 2019    Reason for Anticoagulation: Atrial Fibrillation   Target INR: 2.0 to 3.0  [id number] VASc Score: 5  HAS-BLED Score: 2   Hemoglobin Value: (!) 10.4  Hematocrit Value: (!) 33.8    INR from last 7 days     Date/Time INR Value    19 1444  1.12        Dose from last 7 days     Date/Time Dose (mg)    19 1523  5    19 1940  5        Significant Interactions: Statin, Proton Pump Inhibitor  Bridge Therapy: No       Plan:  Coumadin 5 mg PO tonight.  Plan to check INR daily and monitor per protocol.  Education Material Provided?: Yes  Pharmacist suggested discharge dosin mg daily     Margaret FinkD

## 2019-06-13 NOTE — ANESTHESIA QCDR
2019 [facility] Clinical Data Registry (for Quality Improvement)     Postoperative nausea/vomiting risk protocol (Adult = 18 yrs and Pediatric 3-17 yrs)- (430 and 463)  General inhalation anesthetic (NOT TIVA) with PONV risk factors: No  Provision of anti-emetic therapy with at least 2 different classes of agents: N/A  Patient DID NOT receive anti-emetic therapy and reason is documented in Medical Record: N/A    Multimodal Pain Management- (AQI59)  Patient undergoing Elective Surgery (i.e. Outpatient, or ASC, or Prescheduled Surgery prior to Hospital Admission): No  Use of Multimodal Pain Management, two or more drugs and/or interventions, NOT including systemic opioids: N/A  Exception: Documented allergy to multiple classes of analgesics: N/A    PACU assessment of acute postoperative pain prior to Anesthesia Care End- Applies to Patients Age = 18- (ABG7)  Initial PACU pain score is which of the following: < 7/10  Patient unable to report pain score: N/A    Post-anesthetic transfer of care checklist/protocol to PACU/ICU- (426 and 427)  Upon conclusion of case, patient transferred to which of the following locations: PACU/Non-ICU  Use of transfer checklist/protocol: Yes  Exclusion: Service Performed in Patient Hospital Room (and thus did not require transfer): N/A    PACU Reintubation- (AQI31)  General anesthesia requiring endotracheal intubation (ETT) along with subsequent extubation in OR or PACU: No  Required reintubation in the PACU: N/A  Extubation was a planned trial documented in the medical record prior to removal of the original airway device: N/A    Unplanned admission to ICU related to anesthesia service up through end of PACU care- (MD51)  Unplanned admission to ICU (not initially anticipated at anesthesia start time): No

## 2019-06-13 NOTE — PROGRESS NOTES
[facility] Services Progress Note    Per Dr. Najjar patient will need Hemodialysis this evening when admitted.     Please call Dialysis RN at [phone]) when patient admitted and ready for treatment.

## 2019-06-13 NOTE — PROGRESS NOTES
2 RN skin check complete with [place].   Devices in place n/a.  Skin assessed under devices n/a.  Confirmed pressure ulcers found on n/a.  New potential pressure ulcers noted on n/a.   Patient's skin is intact  The following interventions in place encouraged patient to turn in bed, pt ambulates to the bathroom, extra pillows provided for support.

## 2019-06-13 NOTE — ANESTHESIA POSTPROCEDURE EVALUATION
Patient: Parmjit Castelan    Procedure Summary     Date:  06/13/19 Room / Location:   OR  / SURGERY [facility]    Anesthesia Start:  1204 Anesthesia Stop:  1222    Procedures:       ECHOCARDIOGRAM, TRANSESOPHAGEAL (Chest)      CARDIOVERSION Diagnosis:  (Atrial flutter newly diagnosed)    Surgeon:  Harvinder Hoang M.D. Responsible Provider:  Julio Paz M.D.    Anesthesia Type:  MAC ASA Status:  3          Final Anesthesia Type: MAC  Last vitals  BP   Blood Pressure : 112/44, NIBP: 146/85    Temp   36.4 °C (97.5 °F)    Pulse   Pulse: 85, Heart Rate (Monitored): 76   Resp   16    SpO2   99 %      Anesthesia Post Evaluation    Patient location during evaluation: PACU  Patient participation: complete - patient participated  Level of consciousness: awake and alert    Airway patency: patent  Anesthetic complications: no  Cardiovascular status: hemodynamically stable  Respiratory status: acceptable  Hydration status: euvolemic    PONV: none           Nurse Pain Score: 0 (NPRS)

## 2019-06-13 NOTE — PROGRESS NOTES
Spoke to Dr. Mckinnon regarding patient's PIV access. ICU RN to attempt to place PIV and if unsuccessful, hold heparin drip and mag until the am.     No PIV access, medications held per Dr. Mckinnon's order.

## 2019-06-13 NOTE — PROCEDURES
Procedure performed: External DC Cardioversion    : Harvinder Hoang MD PhD FACC    Assistant: None    Anesthesia: Per anesthesia services    Indication: Atrial flutter    Preprocedural Diagnosis:   Atrial Flutter  Postprocedural Diagnosis: Sinus Rhythm    Description of procedure:    Mr. Castelan was brought to the pre/post procedure area. Informed consent was obtained. Defibrillator pads were placed in the anterior and posterior position.  Adequate sedation was obtained with assistance of anesthesia. A JUSTIN performed confirmed that there was NO left atrial thrombus. Patient was successfully cardioverted with 100 J synchronized biphasic energy into sinus rhythm. He was monitored in the recovery area until criteria met.    Conclusion: Successful DC cardioversion    EBL: None    Complications: None      Electronically signed: Harvinder Hoang MD PhD FACC  Cardiologist [facility]

## 2019-06-13 NOTE — CARE PLAN
Problem: Safety  Goal: Will remain free from falls  Outcome: PROGRESSING AS EXPECTED  Reinforced fall risk precautions. Non-skid socks on feet, call light in reach. Independent to the bathroom.      Problem: Venous Thromboembolism (VTW)/Deep Vein Thrombosis (DVT) Prevention:  Goal: Patient will participate in Venous Thrombosis (VTE)/Deep Vein Thrombosis (DVT)Prevention Measures  Outcome: PROGRESSING AS EXPECTED  Plan for heparin drip when patient returns from PACU. Started on coumadin yesterday    Problem: Knowledge Deficit  Goal: Knowledge of disease process/condition, treatment plan, diagnostic tests, and medications will improve  Outcome: PROGRESSING AS EXPECTED  Discuss POC with Pt. Encourage patient to ask questions and be involved in plan of care. Assess Pt's knowledge of disease process, treatment plan, diagnostic test, labs, and medications; educate, and give information as needed.

## 2019-06-13 NOTE — CARE PLAN
Problem: Communication  Goal: The ability to communicate needs accurately and effectively will improve  Outcome: PROGRESSING AS EXPECTED  Oriented patient to room and environment, encouraged patient to use call light when needing assistance. Hourly rounding is in place, will continue to monitor.     Problem: Knowledge Deficit  Goal: Knowledge of disease process/condition, treatment plan, diagnostic tests, and medications will improve  Outcome: PROGRESSING AS EXPECTED  Discussed plan of care with patient including medications to be administered, dialysis that is currently taking place and echo, JUSTIN, and cardioversion ordered by cardiologist. Patient and patient's wife agreed and verbalized understanding.

## 2019-06-13 NOTE — CONSULTS
DATE OF SERVICE:  06/12/2019    REQUESTING PHYSICIAN:  Max Monreal MD    REASON FOR CONSULTATION:  Management of chronic kidney disease, assessing the   need for urgent dialysis.    HISTORY OF PRESENT ILLNESS:  The patient is an unfortunate 78-year-old   gentleman with a past medical history significant for end-stage renal disease,   undergoes hemodialysis on Monday, Wednesday, and Friday, and presented to an   urgent care earlier today with a cough.  Upon evaluation, patient was found to   be in rapid ventricular response with possible atrial flutter, patient was   treated with Cardizem and transferred to [facility] where he has   been admitted, so he missed his dialysis earlier today.  We were called to   manage his kidney disease, assessing the need for dialysis.    The patient is doing well.  No chest pain, no shortness of breath, no fever,   no chills.    PAST MEDICAL HISTORY:  1. End-stage renal disease.  2. COPD.  3. Hypertension.    ALLERGIES:  No known drug allergies.    SOCIAL HISTORY:  The patient is .  He has a remote history of smoking.    FAMILY HISTORY:  Positive for hypertension.    MEDICATIONS:  Reviewed.    REVIEW OF SYSTEMS:  All systems reviewed.  They were negative except as   outlined in the history of present illness.    PHYSICAL EXAMINATION:  GENERAL:  The patient is in no apparent distress.  VITAL SIGNS:  Show blood pressure of 146/85, heart rate was 84, respiratory   rate is 18.  HEENT:  Normocephalic.  No scleral icterus.  Pupils are reactive.  NECK:  No lymphadenopathy.  CHEST:  Clear to auscultation.  HEART:  S1, S2.  ABDOMEN:  Soft, nontender.  No hepatosplenomegaly.  EXTREMITIES:  There is +1 edema.  SKIN:  No skin rashes.  NEUROLOGIC:  The patient is alert and oriented x3.    LABORATORY DATA:  His recent labs from today were reviewed.  I also reviewed   the chest x-ray imaging myself, which showed cardiomegaly.    ASSESSMENT:  1. End-stage renal disease.  2.  Uncontrolled hypertension.  3. Anemia.  4. Cardiomegaly.    PLAN:  1. We will plan on dialysis to manage his uremia and uncontrolled hypertension  2. Renal diet.  3. Renally dose all medications.  4. Avoid nephrotoxins.    Plan discussed in detail with Dr. Monreal who I would like to thank for   consulting this very interesting case.       ____________________________________     FADI NAJJAR, MD FN / DELANO    DD:  06/12/2019 21:30:19  DT:  06/12/2019 23:55:05    D#:  [id number]  Job#:  [id number]

## 2019-06-13 NOTE — PROGRESS NOTES
Transferred rooms. Wife at bedside. Patient wants to go home. MD aware. May or may not discharge today depending on INR/Coumadin. Pt and family aware.

## 2019-06-13 NOTE — PROGRESS NOTES
Report given to Oumou BYRD. Plan of care discussed. Patient resting comfortably in bed. Safety precautions in place.

## 2019-06-13 NOTE — PROGRESS NOTES
Spoke to Dr. Mckinnon to update on increase in troponin, Dr. Mckinnon entered order for more troponins to be drawn.

## 2019-06-14 ENCOUNTER — APPOINTMENT (OUTPATIENT)
Dept: MEDICAL GROUP | Facility: MEDICAL CENTER | Age: 79
End: 2019-06-14
Payer: MEDICARE

## 2019-06-14 ENCOUNTER — PATIENT OUTREACH (OUTPATIENT)
Dept: HEALTH INFORMATION MANAGEMENT | Facility: OTHER | Age: 79
End: 2019-06-14

## 2019-06-14 VITALS
DIASTOLIC BLOOD PRESSURE: 100 MMHG | HEART RATE: 84 BPM | BODY MASS INDEX: 25.73 KG/M2 | OXYGEN SATURATION: 95 % | HEIGHT: 68 IN | WEIGHT: 169.75 LBS | RESPIRATION RATE: 20 BRPM | TEMPERATURE: 97.5 F | SYSTOLIC BLOOD PRESSURE: 173 MMHG

## 2019-06-14 PROBLEM — I31.39 PERICARDIAL EFFUSION WITHOUT CARDIAC TAMPONADE: Status: RESOLVED | Noted: 2017-04-20 | Resolved: 2019-06-14

## 2019-06-14 PROBLEM — I48.92 ATRIAL FLUTTER (HCC): Chronic | Status: RESOLVED | Noted: 2019-06-12 | Resolved: 2019-06-14

## 2019-06-14 LAB
ANION GAP SERPL CALC-SCNC: 20 MMOL/L (ref 0–11.9)
BUN SERPL-MCNC: 75 MG/DL (ref 8–22)
CALCIUM SERPL-MCNC: 9.3 MG/DL (ref 8.4–10.2)
CHLORIDE SERPL-SCNC: 98 MMOL/L (ref 96–112)
CO2 SERPL-SCNC: 24 MMOL/L (ref 20–33)
CREAT SERPL-MCNC: 10.44 MG/DL (ref 0.5–1.4)
FERRITIN SERPL-MCNC: 260.4 NG/ML (ref 22–322)
GLUCOSE SERPL-MCNC: 105 MG/DL (ref 65–99)
INR PPP: 1.26 (ref 0.87–1.13)
IRON SATN MFR SERPL: 14 % (ref 15–55)
IRON SERPL-MCNC: 35 UG/DL (ref 50–180)
PHOSPHATE SERPL-MCNC: 7.1 MG/DL (ref 2.5–4.5)
POTASSIUM SERPL-SCNC: 4.9 MMOL/L (ref 3.6–5.5)
PROTHROMBIN TIME: 16 SEC (ref 12–14.6)
SODIUM SERPL-SCNC: 142 MMOL/L (ref 135–145)
TIBC SERPL-MCNC: 251 UG/DL (ref 250–450)

## 2019-06-14 PROCEDURE — 84100 ASSAY OF PHOSPHORUS: CPT

## 2019-06-14 PROCEDURE — 83550 IRON BINDING TEST: CPT

## 2019-06-14 PROCEDURE — A9270 NON-COVERED ITEM OR SERVICE: HCPCS | Performed by: INTERNAL MEDICINE

## 2019-06-14 PROCEDURE — 83540 ASSAY OF IRON: CPT

## 2019-06-14 PROCEDURE — 700102 HCHG RX REV CODE 250 W/ 637 OVERRIDE(OP): Performed by: INTERNAL MEDICINE

## 2019-06-14 PROCEDURE — 700101 HCHG RX REV CODE 250: Performed by: INTERNAL MEDICINE

## 2019-06-14 PROCEDURE — 90935 HEMODIALYSIS ONE EVALUATION: CPT

## 2019-06-14 PROCEDURE — A9270 NON-COVERED ITEM OR SERVICE: HCPCS | Performed by: HOSPITALIST

## 2019-06-14 PROCEDURE — 99239 HOSP IP/OBS DSCHRG MGMT >30: CPT | Performed by: INTERNAL MEDICINE

## 2019-06-14 PROCEDURE — 94760 N-INVAS EAR/PLS OXIMETRY 1: CPT

## 2019-06-14 PROCEDURE — 700102 HCHG RX REV CODE 250 W/ 637 OVERRIDE(OP): Performed by: HOSPITALIST

## 2019-06-14 PROCEDURE — 80048 BASIC METABOLIC PNL TOTAL CA: CPT

## 2019-06-14 PROCEDURE — 90935 HEMODIALYSIS ONE EVALUATION: CPT | Performed by: INTERNAL MEDICINE

## 2019-06-14 PROCEDURE — 5A1D70Z PERFORMANCE OF URINARY FILTRATION, INTERMITTENT, LESS THAN 6 HOURS PER DAY: ICD-10-PCS | Performed by: INTERNAL MEDICINE

## 2019-06-14 PROCEDURE — 94640 AIRWAY INHALATION TREATMENT: CPT

## 2019-06-14 PROCEDURE — 82728 ASSAY OF FERRITIN: CPT

## 2019-06-14 PROCEDURE — 85610 PROTHROMBIN TIME: CPT

## 2019-06-14 RX ORDER — WARFARIN SODIUM 3 MG/1
6 TABLET ORAL
Status: DISCONTINUED | OUTPATIENT
Start: 2019-06-14 | End: 2019-06-14 | Stop reason: HOSPADM

## 2019-06-14 RX ORDER — LIDOCAINE HYDROCHLORIDE 10 MG/ML
1 INJECTION, SOLUTION INFILTRATION; PERINEURAL
Status: DISCONTINUED | OUTPATIENT
Start: 2019-06-14 | End: 2019-06-14 | Stop reason: HOSPADM

## 2019-06-14 RX ORDER — BENZONATATE 100 MG/1
100 CAPSULE ORAL 3 TIMES DAILY PRN
Qty: 60 CAP | Refills: 0 | Status: SHIPPED | OUTPATIENT
Start: 2019-06-14 | End: 2019-07-10

## 2019-06-14 RX ORDER — METOPROLOL SUCCINATE 50 MG/1
50 TABLET, EXTENDED RELEASE ORAL DAILY
Qty: 30 TAB | Refills: 1 | Status: SHIPPED | OUTPATIENT
Start: 2019-06-15 | End: 2019-06-25 | Stop reason: SDUPTHER

## 2019-06-14 RX ORDER — WARFARIN SODIUM 5 MG/1
5 TABLET ORAL DAILY
Qty: 30 TAB | Refills: 3 | Status: SHIPPED | OUTPATIENT
Start: 2019-06-14 | End: 2019-09-13

## 2019-06-14 RX ORDER — LISINOPRIL 5 MG/1
5 TABLET ORAL 2 TIMES DAILY
Qty: 60 TAB | Refills: 1 | Status: ON HOLD | OUTPATIENT
Start: 2019-06-14 | End: 2019-08-11

## 2019-06-14 RX ADMIN — LISINOPRIL 5 MG: 5 TABLET ORAL at 05:40

## 2019-06-14 RX ADMIN — FUROSEMIDE 80 MG: 40 TABLET ORAL at 05:41

## 2019-06-14 RX ADMIN — OMEPRAZOLE 40 MG: 20 CAPSULE, DELAYED RELEASE ORAL at 05:41

## 2019-06-14 RX ADMIN — VITAMIN D, TAB 1000IU (100/BT) 1000 UNITS: 25 TAB at 05:40

## 2019-06-14 RX ADMIN — LIDOCAINE HYDROCHLORIDE 1 ML: 10 INJECTION, SOLUTION INFILTRATION; PERINEURAL at 06:50

## 2019-06-14 RX ADMIN — FLUTICASONE PROPIONATE 100 MCG: 50 SPRAY, METERED NASAL at 05:41

## 2019-06-14 RX ADMIN — SODIUM BICARBONATE 3 MEQ: 84 INJECTION, SOLUTION INTRAVENOUS at 03:14

## 2019-06-14 RX ADMIN — UMECLIDINIUM BROMIDE AND VILANTEROL TRIFENATATE 1 PUFF: 62.5; 25 POWDER RESPIRATORY (INHALATION) at 07:21

## 2019-06-14 RX ADMIN — CALCIUM ACETATE 2001 MG: 667 CAPSULE ORAL at 08:19

## 2019-06-14 RX ADMIN — FLUTICASONE PROPIONATE 88 MCG: 44 AEROSOL, METERED RESPIRATORY (INHALATION) at 07:24

## 2019-06-14 RX ADMIN — METOPROLOL SUCCINATE 50 MG: 25 TABLET, EXTENDED RELEASE ORAL at 05:41

## 2019-06-14 RX ADMIN — TAMSULOSIN HYDROCHLORIDE 0.8 MG: 0.4 CAPSULE ORAL at 05:41

## 2019-06-14 RX ADMIN — SODIUM BICARBONATE 3 MEQ: 84 INJECTION, SOLUTION INTRAVENOUS at 07:25

## 2019-06-14 NOTE — PROGRESS NOTES
[facility] Progress Note      HD ordered by Dr. Najjar. Treatment started at 0700 and ended at 1000.     Total Net UF 3200mL.    Pt tolerated treatment well with no issues. See paper flow sheet for details. Cannulation needles taken out, held pressure for 10 min and placed gauze dressing with no bleeding. ANU AVF + for bruit/thrill. Report given to MERLYN Ernst RN.

## 2019-06-14 NOTE — ASSESSMENT & PLAN NOTE
-JUSTIN today shows worsened EF, now 30%  -stopped dilt  -start toprol XL  -keep out of atrial flutter/fibrillation

## 2019-06-14 NOTE — PROCEDURES
Pt with ESRD, presented with tachycardia.  Pt is doing better.  Seen and examined while getting HD.

## 2019-06-14 NOTE — FLOWSHEET NOTE
06/14/19 0316   Interdisciplinary Plan of Care-Goals (Indications)   Bronchodilator Indications History / Diagnosis   Interdisciplinary Plan of Care-Outcomes    Bronchodilator Outcome Diminished Wheezing and Volume of Air Movement Increased   Education   Education Yes - Pt. / Family has been Instructed in use of Respiratory Equipment;Yes - Pt. / Family has been Instructed in use of Respiratory Medications and Adverse Reactions   SVN Group   #SVN Performed Yes   Given By: Mouthpiece   Respiratory WDL   Respiratory (WDL) X   Chest Exam   Respiration 18   Pulse 80   Breath Sounds   Pre/Post Intervention Post Intervention Assessment   RUL Breath Sounds Expiratory Wheezes   RML Breath Sounds Diminished   RLL Breath Sounds Diminished   SILVANA Breath Sounds Expiratory Wheezes   LLL Breath Sounds Diminished   Oximetry   #Pulse Oximetry (Single Determination) Yes   Oxygen   Pulse Oximetry 96 %   O2 (LPM) 0   FiO2% 21 %   O2 Daily Delivery Respiratory  Room Air with O2 Available

## 2019-06-14 NOTE — DISCHARGE INSTRUCTIONS
Discharge patient Home  Diet: low fat, low sugar, low potassium, with 9-13 servings of fruits and vegetables  Activities as tolerated  Follow ups with PCP in 7-10 days, call for appointment  Meds per med rec sheet  No smoking, no alcohol, no caffeine  Wear seat belt in motorized vehicle  Take medications as perscribed  Keep appointments  If symptoms worsen call PCP, 911 or urgent care.    Discharge Instructions    Discharged to home by car with relative. Discharged via walking, hospital escort: Yes.  Special equipment needed: Not Applicable    Be sure to schedule a follow-up appointment with your primary care doctor or any specialists as instructed.     Discharge Plan:   Diet Plan: Discussed  Activity Level: Discussed  Confirmed Follow up Appointment: Appointment Scheduled  Confirmed Symptoms Management: Discussed  Medication Reconciliation Updated: Yes  Influenza Vaccine Indication: Not indicated: Previously immunized this influenza season and > 8 years of age    I understand that a diet low in cholesterol, fat, and sodium is recommended for good health. Unless I have been given specific instructions below for another diet, I accept this instruction as my diet prescription.   Other diet: Renal    Special Instructions: Coumadin    · Is patient discharged on Warfarin / Coumadin?   Yes    You are receiving the drug warfarin. Please understand the importance of monitoring warfarin with scheduled PT/INR blood draws.  Follow-up with the [facility] in one week for INR lab. Doctor recommends you see the coumadin clinic on Monday.    IMPORTANT: HOW TO USE THIS INFORMATION:  This is a summary and does NOT have all possible information about this product. This information does not assure that this product is safe, effective, or appropriate for you. This information is not individual medical advice and does not substitute for the advice of your health care professional. Always ask your health care professional for  "complete information about this product and your specific health needs.      WARFARIN - ORAL (WARF-uh-rin)      COMMON BRAND NAME(S): Coumadin      WARNING:  Warfarin can cause very serious (possibly fatal) bleeding. This is more likely to occur when you first start taking this medication or if you take too much warfarin. To decrease your risk for bleeding, your doctor or other health care provider will monitor you closely and check your lab results (INR test) to make sure you are not taking too much warfarin. Keep all medical and laboratory appointments. Tell your doctor right away if you notice any signs of serious bleeding. See also Side Effects section.      USES:  This medication is used to treat blood clots (such as in deep vein thrombosis-DVT or pulmonary embolus-PE) and/or to prevent new clots from forming in your body. Preventing harmful blood clots helps to reduce the risk of a stroke or heart attack. Conditions that increase your risk of developing blood clots include a certain type of irregular heart rhythm (atrial fibrillation), heart valve replacement, recent heart attack, and certain surgeries (such as hip/knee replacement). Warfarin is commonly called a \"blood thinner,\" but the more correct term is \"anticoagulant.\" It helps to keep blood flowing smoothly in your body by decreasing the amount of certain substances (clotting proteins) in your blood.      HOW TO USE:  Read the Medication Guide provided by your pharmacist before you start taking warfarin and each time you get a refill. If you have any questions, ask your doctor or pharmacist. Take this medication by mouth with or without food as directed by your doctor or other health care professional, usually once a day. It is very important to take it exactly as directed. Do not increase the dose, take it more frequently, or stop using it unless directed by your doctor. Dosage is based on your medical condition, laboratory tests (such as INR), and " response to treatment. Your doctor or other health care provider will monitor you closely while you are taking this medication to determine the right dose for you. Use this medication regularly to get the most benefit from it. To help you remember, take it at the same time each day. It is important to eat a balanced, consistent diet while taking warfarin. Some foods can affect how warfarin works in your body and may affect your treatment and dose. Avoid sudden large increases or decreases in your intake of foods high in vitamin K (such as broccoli, cauliflower, cabbage, brussels sprouts, kale, spinach, and other green leafy vegetables, liver, green tea, certain vitamin supplements). If you are trying to lose weight, check with your doctor before you try to go on a diet. Cranberry products may also affect how your warfarin works. Limit the amount of cranberry juice (16 ounces/480 milliliters a day) or other cranberry products you may drink or eat.      SIDE EFFECTS:  Nausea, loss of appetite, or stomach/abdominal pain may occur. If any of these effects persist or worsen, tell your doctor or pharmacist promptly. Remember that your doctor has prescribed this medication because he or she has judged that the benefit to you is greater than the risk of side effects. Many people using this medication do not have serious side effects. This medication can cause serious bleeding if it affects your blood clotting proteins too much (shown by unusually high INR lab results). Even if your doctor stops your medication, this risk of bleeding can continue for up to a week. Tell your doctor right away if you have any signs of serious bleeding, including: unusual pain/swelling/discomfort, unusual/easy bruising, prolonged bleeding from cuts or gums, persistent/frequent nosebleeds, unusually heavy/prolonged menstrual flow, pink/dark urine, coughing up blood, vomit that is bloody or looks like coffee grounds, severe headache,  dizziness/fainting, unusual or persistent tiredness/weakness, bloody/black/tarry stools, chest pain, shortness of breath, difficulty swallowing. Tell your doctor right away if any of these unlikely but serious side effects occur: persistent nausea/vomiting, severe stomach/abdominal pain, yellowing eyes/skin. This drug rarely has caused very serious (possibly fatal) problems if its effects lead to small blood clots (usually at the beginning of treatment). This can lead to severe skin/tissue damage that may require surgery or amputation if left untreated. Patients with certain blood conditions (protein C or S deficiency) may be at greater risk. Get medical help right away if any of these rare but serious side effects occur: painful/red/purplish patches on the skin (such as on the toe, breast, abdomen), change in the amount of urine, vision changes, confusion, slurred speech, weakness on one side of the body. A very serious allergic reaction to this drug is rare. However, get medical help right away if you notice any symptoms of a serious allergic reaction, including: rash, itching/swelling (especially of the face/tongue/throat), severe dizziness, trouble breathing. This is not a complete list of possible side effects. If you notice other effects not listed above, contact your doctor or pharmacist. In the [place] - Call your doctor for medical advice about side effects. You may report side effects to FDA at 4-983-XZK-0730. In [place] - Call your doctor for medical advice about side effects. You may report side effects to [facility] [place] at 1-829.699.2853.      PRECAUTIONS:  Before taking warfarin, tell your doctor or pharmacist if you are allergic to it; or if you have any other allergies. This product may contain inactive ingredients, which can cause allergic reactions or other problems. Talk to your pharmacist for more details. Before using this medication, tell your doctor or pharmacist your medical history, especially of:  blood disorders (such as anemia, hemophilia), bleeding problems (such as bleeding of the stomach/intestines, bleeding in the brain), blood vessel disorders (such as aneurysms), recent major injury/surgery, liver disease, alcohol use, mental/mood disorders (including memory problems), frequent falls/injuries. It is important that all your doctors and dentists know that you take warfarin. Before having surgery or any medical/dental procedures, tell your doctor or dentist that you are taking this medication and about all the products you use (including prescription drugs, nonprescription drugs, and herbal products). Avoid getting injections into the muscles. If you must have an injection into a muscle (for example, a flu shot), it should be given in the arm. This way, it will be easier to check for bleeding and/or apply pressure bandages. This medication may cause stomach bleeding. Daily use of alcohol while using this medicine will increase your risk for stomach bleeding and may also affect how this medication works. Limit or avoid alcoholic beverages. If you have not been eating well, if you have an illness or infection that causes fever, vomiting, or diarrhea for more than 2 days, or if you start using any antibiotic medications, contact your doctor or pharmacist immediately because these conditions can affect how warfarin works. This medication can cause heavy bleeding. To lower the chance of getting cut, bruised, or injured, use great caution with sharp objects like safety razors and nail cutters. Use an electric razor when shaving and a soft toothbrush when brushing your teeth. Avoid activities such as contact sports. If you fall or injure yourself, especially if you hit your head, call your doctor immediately. Your doctor may need to check you. The Food & Drug Administration has stated that generic warfarin products are interchangeable. However, consult your doctor or pharmacist before switching warfarin  "products. Be careful not to take more than one medication that contains warfarin unless specifically directed by the doctor or health care provider who is monitoring your warfarin treatment. Older adults may be at greater risk for bleeding while using this drug. This medication is not recommended for use during pregnancy because of serious (possibly fatal) harm to an unborn baby. Discuss the use of reliable forms of birth control with your doctor. If you become pregnant or think you may be pregnant, tell your doctor immediately. If you are planning pregnancy, discuss a plan for managing your condition with your doctor before you become pregnant. Your doctor may switch the type of medication you use during pregnancy. Very small amounts of this medication may pass into breast milk but is unlikely to harm a nursing infant. Consult your doctor before breast-feeding.      DRUG INTERACTIONS:  Drug interactions may change how your medications work or increase your risk for serious side effects. This document does not contain all possible drug interactions. Keep a list of all the products you use (including prescription/nonprescription drugs and herbal products) and share it with your doctor and pharmacist. Do not start, stop, or change the dosage of any medicines without your doctor's approval. Warfarin interacts with many prescription, nonprescription, vitamin, and herbal products. This includes medications that are applied to the skin or inside the vagina or rectum. The interactions with warfarin usually result in an increase or decrease in the \"blood-thinning\" (anticoagulant) effect. Your doctor or other health care professional should closely monitor you to prevent serious bleeding or clotting problems. While taking warfarin, it is very important to tell your doctor or pharmacist of any changes in medications, vitamins, or herbal products that you are taking. Some products that may interact with this drug include: " capecitabine, imatinib, mifepristone. Aspirin, aspirin-like drugs (salicylates), and nonsteroidal anti-inflammatory drugs (NSAIDs such as ibuprofen, naproxen, celecoxib) may have effects similar to warfarin. These drugs may increase the risk of bleeding problems if taken during treatment with warfarin. Carefully check all prescription/nonprescription product labels (including drugs applied to the skin such as pain-relieving creams) since the products may contain NSAIDs or salicylates. Talk to your doctor about using a different medication (such as acetaminophen) to treat pain/fever. Low-dose aspirin and related drugs (such as clopidogrel, ticlopidine) should be continued if prescribed by your doctor for specific medical reasons such as heart attack or stroke prevention. Consult your doctor or pharmacist for more details. Many herbal products interact with warfarin. Tell your doctor before taking any herbal products, especially bromelains, coenzyme Q10, cranberry, danshen, dong quai, fenugreek, garlic, ginkgo biloba, ginseng, and Adina's wort, among others. This medication may interfere with a certain laboratory test to measure theophylline levels, possibly causing false test results. Make sure laboratory personnel and all your doctors know you use this drug.      OVERDOSE:  If overdose is suspected, contact a poison control center or emergency room immediately. [place] residents can call the [place] National Poison Hotline at 1-699.373.7895. [place] residents can call a provincial poison control center. Symptoms of overdose may include: bloody/black/tarry stools, pink/dark urine, unusual/prolonged bleeding.      NOTES:  Do not share this medication with others. Laboratory and/or medical tests (such as INR, complete blood count) must be performed periodically to monitor your progress or check for side effects. Consult your doctor for more details.      MISSED DOSE:  For the best possible benefit, do not miss any doses. If  you do miss a dose and remember on the same day, take it as soon as you remember. If you remember on the next day, skip the missed dose and resume your usual dosing schedule. Do not double the dose to catch up because this could increase your risk for bleeding. Keep a record of missed doses to give to your doctor or pharmacist. Contact your doctor or pharmacist if you miss 2 or more doses in a row.      STORAGE:  Store at room temperature away from light and moisture. Do not store in the bathroom. Keep all medications away from children and pets. Do not flush medications down the toilet or pour them into a drain unless instructed to do so. Properly discard this product when it is  or no longer needed. Consult your pharmacist or local waste disposal company for more details about how to safely discard your product.      MEDICAL ALERT:  Your condition and medication can cause complications in a medical emergency. For information about enrolling in MedicAlert, call 1-133.938.4536 ([place]) or 1-224.345.6027 ([place]).      Information last revised 2010 Copyright(c) 2010 [employer]          Atrial Flutter  Atrial flutter is a type of abnormal heart rhythm (arrhythmia). In atrial flutter, the heartbeat is fast but regular. There are two types of atrial flutter:  · Paroxysmal atrial flutter. This type starts suddenly. It usually stops on its own soon after it starts.  · Permanent atrial flutter. This type does not go away.  What are the causes?  This condition may be caused by:  · A heart condition or problem, such as:  ¨ A heart attack.  ¨ Heart failure.  ¨ A heart valve problem.  · A lung problem, such as:  ¨ A blood clot in the lungs (pulmonary embolism, or PE).  ¨ Chronic obstructive pulmonary disease.  · Poorly controlled high blood pressure (hypertension).  · Hyperthyroidism.  · Caffeine.  · Some decongestant cold medicines.  · Low levels of minerals called electrolytes in the  blood.  · Cocaine.  What increases the risk?  This condition is more likely to develop in:  · Elderly adults.  · Men.  What are the signs or symptoms?  Symptoms of this condition include:  · A feeling that your heart is pounding or racing (palpitations).  · Shortness of breath.  · Chest pain.  · Feeling light-headed.  · Dizziness.  · Fainting.  How is this diagnosed?  This condition may be diagnosed with tests, including:  · An electrocardiogram (ECG). This is a painless test that records electrical signals in the heart.  · Holter monitoring. For this test, you wear a device that records your heartbeat for 1-2 days.  · Cardiac event monitoring. For this test, you wear a device that records your heartbeat for up to 30 days.  · An echocardiogram. This is a painless test that uses sound waves to make a picture of your heart.  · Stress test. This test records your heartbeat while you exercise.  · Blood tests.  How is this treated?  This condition may be treated with:  · Treatment of any underlying conditions.  · Medicine to make your heart beat more slowly.  · Medicine to keep the condition from coming back.  · A procedure to keep the condition under control. Some procedures to do this include:  ¨ Cardioversion. During this procedure, medicines or an electrical shock are given to make the heart beat normally.  ¨ Ablation. During this procedure, the heart tissue that is causing the problem is destroyed. This procedure may be done if atrial flutter lasts a long time or happens often.  Follow these instructions at home:  · Take over-the-counter and prescription medicines only as told by your health care provider.  · Do not take any new medicines without talking to your health care provider.  · Do not use tobacco products, including cigarettes, chewing tobacco, or e-cigarettes. If you need help quitting, ask your health care provider.  · Limit alcohol intake to no more than 1 drink per day for nonpregnant women and 2 drinks  per day for men. One drink equals 12 oz of beer, 5 oz of wine, or 1½ oz of hard liquor.  · Try to reduce any stress. Stress can make your symptoms worse.  Contact a health care provider if:  · Your symptoms get worse.  Get help right away if:  · You are dizzy.  · You feel like fainting or you faint.  · You have shortness of breath.  · You feel pain or pressure in your chest.  · You suddenly feel nauseous or you suddenly vomit.  · There is a sudden change in your ability to speak, eat, or move.  · You are sweating a lot for no reason.  This information is not intended to replace advice given to you by your health care provider. Make sure you discuss any questions you have with your health care provider.  Document Released: 05/06/2010 Document Revised: 04/26/2017 Document Reviewed: 07/01/2016  [employer] Interactive Patient Education © 2017 [employer]     Metoprolol extended-release tablets  What is this medicine?  METOPROLOL (me TOE proe lole) is a beta-blocker. Beta-blockers reduce the workload on the heart and help it to beat more regularly. This medicine is used to treat high blood pressure and to prevent chest pain. It is also used to after a heart attack and to prevent an additional heart attack from occurring.  This medicine may be used for other purposes; ask your health care provider or pharmacist if you have questions.  COMMON BRAND NAME(S): toprol, Toprol XL  What should I tell my health care provider before I take this medicine?  They need to know if you have any of these conditions:  -diabetes  -heart or vessel disease like slow heart rate, worsening heart failure, heart block, sick sinus syndrome or Raynaud's disease  -kidney disease  -liver disease  -lung or breathing disease, like asthma or emphysema  -pheochromocytoma  -thyroid disease  -an unusual or allergic reaction to metoprolol, other beta-blockers, medicines, foods, dyes, or preservatives  -pregnant or trying to get  pregnant  -breast-feeding  How should I use this medicine?  Take this medicine by mouth with a glass of water. Follow the directions on the prescription label. Do not crush or chew. Take this medicine with or immediately after meals. Take your doses at regular intervals. Do not take more medicine than directed. Do not stop taking this medicine suddenly. This could lead to serious heart-related effects.  Talk to your pediatrician regarding the use of this medicine in children. While this drug may be prescribed for children as young as 6 years for selected conditions, precautions do apply.  Overdosage: If you think you have taken too much of this medicine contact a poison control center or emergency room at once.  NOTE: This medicine is only for you. Do not share this medicine with others.  What if I miss a dose?  If you miss a dose, take it as soon as you can. If it is almost time for your next dose, take only that dose. Do not take double or extra doses.  What may interact with this medicine?  This medicine may interact with the following medications:  -certain medicines for blood pressure, heart disease, irregular heart beat  -certain medicines for depression, like monoamine oxidase (MAO) inhibitors, fluoxetine, or paroxetine  -clonidine  -dobutamine  -epinephrine  -isoproterenol  -reserpine  This list may not describe all possible interactions. Give your health care provider a list of all the medicines, herbs, non-prescription drugs, or dietary supplements you use. Also tell them if you smoke, drink alcohol, or use illegal drugs. Some items may interact with your medicine.  What should I watch for while using this medicine?  Visit your doctor or health care professional for regular check ups. Contact your doctor right away if your symptoms worsen. Check your blood pressure and pulse rate regularly. Ask your health care professional what your blood pressure and pulse rate should be, and when you should contact  them.  You may get drowsy or dizzy. Do not drive, use machinery, or do anything that needs mental alertness until you know how this medicine affects you. Do not sit or stand up quickly, especially if you are an older patient. This reduces the risk of dizzy or fainting spells. Contact your doctor if these symptoms continue. Alcohol may interfere with the effect of this medicine. Avoid alcoholic drinks.  What side effects may I notice from receiving this medicine?  Side effects that you should report to your doctor or health care professional as soon as possible:  -allergic reactions like skin rash, itching or hives  -cold or numb hands or feet  -depression  -difficulty breathing  -faint  -fever with sore throat  -irregular heartbeat, chest pain  -rapid weight gain  -swollen legs or ankles  Side effects that usually do not require medical attention (report to your doctor or health care professional if they continue or are bothersome):  -anxiety or nervousness  -change in sex drive or performance  -dry skin  -headache  -nightmares or trouble sleeping  -short term memory loss  -stomach upset or diarrhea  -unusually tired  This list may not describe all possible side effects. Call your doctor for medical advice about side effects. You may report side effects to FDA at 2-693-FDA-9206.  Where should I keep my medicine?  Keep out of the reach of children.  Store at room temperature between 15 and 30 degrees C (59 and 86 degrees F). Throw away any unused medicine after the expiration date.  NOTE: This sheet is a summary. It may not cover all possible information. If you have questions about this medicine, talk to your doctor, pharmacist, or health care provider.  © 2018 [employer] (2014-08-22 14:41:37)        Depression / Suicide Risk    As you are discharged from this [facility] facility, it is important to learn how to keep safe from harming yourself.    Recognize the warning signs:  · Abrupt changes in  personality, positive or negative- including increase in energy   · Giving away possessions  · Change in eating patterns- significant weight changes-  positive or negative  · Change in sleeping patterns- unable to sleep or sleeping all the time   · Unwillingness or inability to communicate  · Depression  · Unusual sadness, discouragement and loneliness  · Talk of wanting to die  · Neglect of personal appearance   · Rebelliousness- reckless behavior  · Withdrawal from people/activities they love  · Confusion- inability to concentrate     If you or a loved one observes any of these behaviors or has concerns about self-harm, here's what you can do:  · Talk about it- your feelings and reasons for harming yourself  · Remove any means that you might use to hurt yourself (examples: pills, rope, extension cords, firearm)  · Get professional help from the community (Mental Health, Substance Abuse, psychological counseling)  · Do not be alone:Call your Safe Contact- someone whom you trust who will be there for you.  · Call your local CRISIS HOTLINE 137-3359 or 526-532-1376  · Call your local Children's Mobile Crisis Response Team [place] (589) 507-0333 or www.[employer]  · Call the toll free National Suicide Prevention Hotlines   · National Suicide Prevention Lifeline 298-753-FJNT (7993)  · National Hope Line Network 800-SUICIDE (320-3601)

## 2019-06-14 NOTE — FLOWSHEET NOTE
06/13/19 2007   Interdisciplinary Plan of Care-Outcomes    Bronchodilator Outcome Diminished Wheezing and Volume of Air Movement Increased   Breath Sounds   Pre/Post Intervention Post Intervention Assessment   RUL Breath Sounds Expiratory Wheezes   RML Breath Sounds Expiratory Wheezes   RLL Breath Sounds Fine Crackles   SILVANA Breath Sounds Expiratory Wheezes   LLL Breath Sounds Fine Crackles

## 2019-06-14 NOTE — FLOWSHEET NOTE
06/13/19 1950   Interdisciplinary Plan of Care-Goals (Indications)   Bronchodilator Indications History / Diagnosis   Interdisciplinary Plan of Care-Outcomes    Bronchodilator Outcome Patient at Stable Baseline   Education   Education Yes - Pt. / Family has been Instructed in use of Respiratory Equipment;Yes - Pt. / Family has been Instructed in use of Respiratory Medications and Adverse Reactions   RT Assessment of Delivered Medications   Evaluation of Medication Delivery Daily Yes-- Pt /Family has been Instructed in use of Respiratory Medications and Adverse Reactions   SVN Group   #SVN Performed Yes   Given By: Mouthpiece   Respiratory WDL   Respiratory (WDL) X   Chest Exam   Respiration 18   Pulse 87   Breath Sounds   Pre/Post Intervention Post Intervention Assessment   RUL Breath Sounds Clear   RML Breath Sounds Clear   RLL Breath Sounds Fine Crackles   SILVANA Breath Sounds Clear   LLL Breath Sounds Fine Crackles   Secretions   Cough Strong;Non Productive   Oximetry   #Pulse Oximetry (Single Determination) Yes   Oxygen   Pulse Oximetry 97 %   O2 (LPM) 0   FiO2% 21 %   O2 Daily Delivery Respiratory  Room Air with O2 Available

## 2019-06-14 NOTE — PROGRESS NOTES
Inpatient Anticoagulation Service Note    Date: 2019    Reason for Anticoagulation: Atrial Fibrillation   Target INR: 2.0 to 3.0  [id number] VASc Score: 5  HAS-BLED Score: 2   Hemoglobin Value: (!) 10.4  Hematocrit Value: (!) 33.8    INR from last 7 days     Date/Time INR Value    19 0328 (!)  1.26    19 1622 (!)  1.22    19 1444  1.12        Dose from last 7 days     Date/Time Dose (mg)    19 0841  6    19 1523  5    19 1940  5        Significant Interactions: Statin, Proton Pump Inhibitor  Bridge Therapy: No, per MD       Plan:  Coumadin 6 mg PO tonight for INR 1.26  Education Material Provided?: Yes  Pharmacist suggested discharge dosin mg daily     Margaret Shaw PharmD

## 2019-06-14 NOTE — PROGRESS NOTES
Report received from ROCHELLE Gallagher. Patient resting comfortably in the chair. Dialysis nurse at bedside, starting dialysis. Declines any needs at this time. Call light in reach.

## 2019-06-14 NOTE — DISCHARGE PLANNING
Outpatient Dialysis Note    Confirmed patient is active at:    [facility]  [address]   [place] [address]       Schedule: Monday, Wednesday, Friday  Time: 2:30 pm    Spoke with Jazmin at facility who confirmed.    Forwarded records for review.    Dialysis Coordinator, [facility]  Dawna Santa [phone]

## 2019-06-14 NOTE — PROGRESS NOTES
Telemetry Shift Summary    Rhythm SR w/ RBBB  HR Range 80-90s  Ectopy Freq PACs  Measurements .20/.14/.40        Normal Values  Rhythm SR  HR Range    Measurements 0.12-0.20 / 0.06-0.10  / 0.30-0.52

## 2019-06-14 NOTE — PROGRESS NOTES
Nephrology Daily Progress Note    Date of Service  6/13/2019    Chief Complaint  78 y.o. male admitted 6/12/2019 with ESRD, tachycardia    Interval Problem Update  Pt had cardioversion earlier today, doing better    Review of Systems  Review of Systems   Constitutional: Negative for chills, fever and malaise/fatigue.   Respiratory: Negative for cough and shortness of breath.    Cardiovascular: Negative for chest pain and leg swelling.   Gastrointestinal: Negative for nausea and vomiting.   Genitourinary: Negative for dysuria, frequency and urgency.        Physical Exam  Temp:  [36.1 °C (97 °F)-36.6 °C (97.9 °F)] 36.5 °C (97.7 °F)  Pulse:  [] 81  Resp:  [16-33] 18  BP: ()/(31-93) 145/66  SpO2:  [94 %-100 %] 96 %    Physical Exam   Constitutional: He is oriented to person, place, and time. No distress.   HENT:   Mouth/Throat: No oropharyngeal exudate.   Eyes: No scleral icterus.   Cardiovascular: Normal rate and regular rhythm.    No murmur heard.  Pulmonary/Chest: Effort normal and breath sounds normal. No respiratory distress. He has no wheezes.   Musculoskeletal: He exhibits no edema or deformity.   Neurological: He is alert and oriented to person, place, and time.   Skin: Skin is warm. He is not diaphoretic. No erythema.   Psychiatric: He has a normal mood and affect. His behavior is normal.   Nursing note and vitals reviewed.      Fluids    Intake/Output Summary (Last 24 hours) at 06/13/19 1714  Last data filed at 06/13/19 1221   Gross per 24 hour   Intake              950 ml   Output             2756 ml   Net            -1806 ml       Laboratory  Recent Labs      06/12/19   1444  06/13/19 0419   WBC  3.5*  4.6*   RBC  3.16*  3.17*   HEMOGLOBIN  10.3*  10.4*   HEMATOCRIT  33.6*  33.8*   MCV  106.3*  106.6*   MCH  32.6  32.8   MCHC  30.7*  30.8*   RDW  61.4*  61.9*   PLATELETCT  180  187   MPV  9.8  10.0     Recent Labs      06/12/19   1444  06/13/19 0419   SODIUM  140  139   POTASSIUM  4.6  4.3    CHLORIDE  97  99   CO2  24  25   GLUCOSE  108*  108*   BUN  92*  63*   CREATININE  11.42*  8.74*   CALCIUM  9.5  9.3     Recent Labs      06/12/19   1444  06/13/19   1622   INR  1.12  1.22*               Imaging      Assessment/Plan  1 ESRD: HD MWF  2 tachycardia  no need for HD  Renal diet  Daily labs  Renal dose all meds  Avoid nephrotoxins  Prognosis guarded.

## 2019-06-14 NOTE — FLOWSHEET NOTE
06/14/19 0725   Interdisciplinary Plan of Care-Goals (Indications)   Bronchodilator Indications History / Diagnosis   Interdisciplinary Plan of Care-Outcomes    Bronchodilator Outcome Improved Patient Appearance with Decreased use of Accessory Muscles   Education   Education Yes - Pt. / Family has been Instructed in use of Respiratory Medications and Adverse Reactions   Therapy Not Performed   Type of Therapy Not Performed SVN   Reason Therapy Not Performed (Held)   RT Assessment of Delivered Medications   Evaluation of Medication Delivery Daily Yes-- Pt /Family has been Instructed in use of Respiratory Medications and Adverse Reactions   Chest Exam   Work Of Breathing / Effort Mild   Respiration 20   Pulse 84   Breath Sounds   RUL Breath Sounds Clear   RML Breath Sounds Fine Crackles;Diminished   RLL Breath Sounds Fine Crackles;Diminished   SILVANA Breath Sounds Clear   LLL Breath Sounds Fine Crackles;Diminished   Secretions   Cough Congested;Non Productive   How Sputum Obtained Spontaneous   Oximetry   #Pulse Oximetry (Single Determination) Yes   Oxygen   Pulse Oximetry 95 %   O2 (LPM) 0   O2 Daily Delivery Respiratory  Room Air with O2 Available

## 2019-06-14 NOTE — FLOWSHEET NOTE
06/13/19 1959   Events/Summary/Plan   Events/Summary/Plan sodium bic. tx   Interdisciplinary Plan of Care-Goals (Indications)   Bronchodilator Indications History / Diagnosis   Interdisciplinary Plan of Care-Outcomes    Bronchodilator Outcome Patient at Stable Baseline   Education   Education Yes - Pt. / Family has been Instructed in use of Respiratory Equipment;Yes - Pt. / Family has been Instructed in use of Respiratory Medications and Adverse Reactions   RT Assessment of Delivered Medications   Evaluation of Medication Delivery Daily Yes-- Pt /Family has been Instructed in use of Respiratory Medications and Adverse Reactions   SVN Group   #SVN Performed Yes   Given By: Mouthpiece   Respiratory WDL   Respiratory (WDL) X   Chest Exam   Respiration 18   Pulse 87   Breath Sounds   Pre/Post Intervention Post Intervention Assessment   RUL Breath Sounds Clear   RML Breath Sounds Clear   RLL Breath Sounds Fine Crackles   SLIVANA Breath Sounds Clear   LLL Breath Sounds Fine Crackles

## 2019-06-14 NOTE — DISCHARGE SUMMARY
"Discharge Summary    CHIEF COMPLAINT ON ADMISSION  Chief Complaint   Patient presents with   • Rapid Heart Beat     per reports sent by  for \"WIDE COMPLEX TACHYCARDIA ON EKG.\"   • Cough       Reason for Admission  Sent by Urgent Care     Admission Date  6/12/2019    CODE STATUS  Full Code    HPI & HOSPITAL COURSE  This is a 78 y.o. male here with above medical issues. Patient presented with atrial flutter with RVR and cough. Patient was admitted to the telemetry unit. Cardiology was consulted.  Patient was initially placed on oral diltiazem with no good effect on HR or rhythm. Patient underwent JUSTIN guided DCCV and converted and sustained.JUSTIN showed an EF of 30%, therefore diltiazem was stopped. Patient was transition to oral toprol XL as outlined below. He was placed on coumadin with follow up with outpatient coumadin clinic on Monday 6/17. ESRD at baseline.     see below     Therefore, he is discharged in fair and stable condition to home with close outpatient follow-up.    The patient met 2-midnight criteria for an inpatient stay at the time of discharge.    Discharge Date  6/14/19    FOLLOW UP ITEMS POST DISCHARGE  F/U as outlined above    DISCHARGE DIAGNOSES  Principal Problem (Resolved):    Atrial flutter (HCC) (Chronic) POA: Yes  Active Problems:    Aortic stenosis, moderate POA: Yes    BPH (benign prostatic hypertrophy) POA: Yes      Overview: ICD-10 transition    HELGA (obstructive sleep apnea) POA: Yes    COPD (chronic obstructive pulmonary disease) (HCC) POA: Yes    End stage renal disease (HCC) POA: Yes    Anemia in CKD (chronic kidney disease) POA: Yes    Pulmonary hypertension (HCC) POA: Yes    Chronic anticoagulation (Chronic) POA: No    Hypomagnesemia POA: Yes    CHF (congestive heart failure), NYHA class II, chronic, systolic (HCC) E F30 in setting of atrial flutter POA: Yes  Resolved Problems:    Pericardial effusion without cardiac tamponade POA: Yes      FOLLOW UP  Future Appointments  Date Time " Provider Department Center   6/14/2019 10:20 AM Martha Light M.D. [facility]   6/25/2019 3:15 PM ILAN Diaz [facility] None   9/12/2019 2:45 PM SUPA [facility] None   9/13/2019 1:40 PM Karon Grissom M.D. [facility] None     No follow-up provider specified.    MEDICATIONS ON DISCHARGE     Medication List      START taking these medications      Instructions   benzonatate 100 MG Caps  Commonly known as:  TESSALON   Take 1 Cap by mouth 3 times a day as needed for Cough.  Dose:  100 mg     metoprolol SR 50 MG Tb24  Start taking on:  6/15/2019  Commonly known as:  TOPROL XL   Take 1 Tab by mouth every day.  Dose:  50 mg     warfarin 5 MG Tabs  Commonly known as:  COUMADIN   Take 1 Tab by mouth every day.  Dose:  5 mg        CHANGE how you take these medications      Instructions   lisinopril 5 MG Tabs  What changed:  · medication strength  · how much to take  Commonly known as:  PRINIVIL   Take 1 Tab by mouth 2 Times a Day.  Dose:  5 mg        CONTINUE taking these medications      Instructions   albuterol 108 (90 Base) MCG/ACT Aers inhalation aerosol  Commonly known as:  PROAIR HFA   Inhale 2 Puffs by mouth every four hours as needed for Shortness of Breath (wheezing).  Dose:  2 Puff     calcium acetate 667 MG Caps  Commonly known as:  PHOS-LO   Take 2,001 mg by mouth 3 times a day, with meals. 2001mg three times a day with meals and 1334mg with snacks  Dose:  2001 mg     doxazosin 4 MG Tabs  Commonly known as:  CARDURA   Take 4 mg by mouth every evening.  Dose:  4 mg     fluticasone 50 MCG/ACT nasal spray  Commonly known as:  FLONASE   Spray 2 Sprays in nose every day.  Dose:  2 Spray     Fluticasone-Umeclidin-Vilant 100-62.5-25 MCG/INH Aepb  Commonly known as:  TRELEGY ELLIPTA   Inhale 1 Puff by mouth every day. Rinse mouth after use  Dose:  1 Puff     furosemide 80 MG Tabs  Commonly known as:  LASIX   Take 80 mg by mouth 2 Times a Day.  Dose:  80 mg     levalbuterol 1.25 MG/3ML  Nebu  Commonly known as:  XOPENEX   Doctor's comments:  COPD J44.9  3 mL by Nebulization route every four hours as needed for Shortness of Breath.  Dose:  1.25 mg     omeprazole 40 MG delayed-release capsule  Commonly known as:  PRILOSEC   Take 40 mg by mouth every morning.  Dose:  40 mg     pravastatin 20 MG Tabs  Commonly known as:  PRAVACHOL   Take 20 mg by mouth every evening.  Dose:  20 mg     ROPINIRole 0.5 MG Tabs  Commonly known as:  REQUIP   Take 1 mg by mouth every bedtime.  Dose:  1 mg     tamsulosin 0.4 MG capsule  Commonly known as:  FLOMAX   Take 0.8 mg by mouth every morning.  Dose:  0.8 mg     traZODone 150 MG Tabs  Commonly known as:  DESYREL   Take 300 mg by mouth every evening.  Dose:  300 mg     zolpidem 10 MG Tabs  Commonly known as:  AMBIEN   Take 10 mg by mouth every bedtime.  Dose:  10 mg        STOP taking these medications    methylPREDNISolone 4 MG Tbpk  Commonly known as:  MEDROL DOSEPAK            Allergies  No Known Allergies    DIET  Orders Placed This Encounter   Procedures   • Diet Order Renal     Standing Status:   Standing     Number of Occurrences:   1     Order Specific Question:   Diet:     Answer:   Renal [8]       ACTIVITY  As tolerated.  Weight bearing as tolerated    CONSULTATIONS  Cards and renal    PROCEDURES  DCCV JUSTIN guided    EC-JUSTIN W/O CONT   Final Result      DX-CHEST-PORTABLE (1 VIEW)   Final Result      1.  Cardiomegaly.      2.  Linear atelectasis within the right lung base.            LABORATORY  Lab Results   Component Value Date    SODIUM 142 06/14/2019    POTASSIUM 4.9 06/14/2019    CHLORIDE 98 06/14/2019    CO2 24 06/14/2019    GLUCOSE 105 (H) 06/14/2019    BUN 75 (HH) 06/14/2019    CREATININE 10.44 (HH) 06/14/2019    CREATININE 2.1 (H) 05/06/2009        Lab Results   Component Value Date    WBC 4.6 (L) 06/13/2019    HEMOGLOBIN 10.4 (L) 06/13/2019    HEMATOCRIT 33.8 (L) 06/13/2019    PLATELETCT 187 06/13/2019        Total time of the discharge process exceeds  41 minutes.

## 2019-06-14 NOTE — PROGRESS NOTES
Telemetry Shift Summary    Rhythm SR/SA  HR Range 70-80s  Ectopy none  Measurements .16/.12/.40        Normal Values  Rhythm SR  HR Range    Measurements 0.12-0.20 / 0.06-0.10  / 0.30-0.52

## 2019-06-14 NOTE — PROGRESS NOTES
Discharge order written. IV removed, patient tolerated well. Tele removed and returned to monitor tech. Belongings gathered. Pt states that all personal belongings are in possession. AVS printed, reviewed and copy signed and placed on the chart. Patient has no further questions. Prescriptions sent to [facility]. Discharged in satisfactory condition home with wife. Pt off unit via wheelchair, escorted by ROCHELLE Zepeda.

## 2019-06-14 NOTE — PROGRESS NOTES
Monitor Summary     Rhythm:SR-ST  Measurements: 0.18/0.12/0.36  ECTOPIES: Rare PAC, rare PVC     Normal Values  Rhythm SR  HR Range    Measurements 0.12-0.20 / 0.06-0.10  / 0.30-0.52

## 2019-06-14 NOTE — PROGRESS NOTES
[facility] Medicine Daily Progress Note    Date of Service  6/13/2019    Chief Complaint  78 y.o. male admitted 6/12/2019 with SOB with A fib/futter  RVR and ESRD on IHD    Hospital Course   see below      Interval Problem Update  A flutter-s/p successful DCCV. Not dilt given low EF. Coumadin, no symptoms.    ESRD-dialyzed fine.     Consultants/Specialty  Renal and cards    Code Status  fcfc    Disposition  TELE then home    Review of Systems  Review of Systems   Constitutional: Negative for chills and fever.   HENT: Negative for hearing loss and tinnitus.    Respiratory: Negative for cough and shortness of breath.    Cardiovascular: Negative for chest pain and palpitations.   Gastrointestinal: Negative for abdominal pain, nausea and vomiting.   Genitourinary: Negative for dysuria.   Musculoskeletal: Negative for myalgias.   All other systems reviewed and are negative.       Physical Exam  Temp:  [36.1 °C (97 °F)-36.6 °C (97.9 °F)] 36.5 °C (97.7 °F)  Pulse:  [] 81  Resp:  [16-33] 18  BP: ()/(31-93) 145/66  SpO2:  [94 %-100 %] 96 %    Physical Exam   Constitutional: He is oriented to person, place, and time. He appears well-developed and well-nourished. No distress.   HENT:   Head: Normocephalic and atraumatic.   Mouth/Throat: No oropharyngeal exudate.   Eyes: Pupils are equal, round, and reactive to light. No scleral icterus.   Neck: Normal range of motion. Neck supple.   Cardiovascular: Normal heart sounds and intact distal pulses.    No murmur heard.  Irr irr, tachycardic   Pulmonary/Chest: Effort normal and breath sounds normal. No stridor. No respiratory distress. He has no wheezes.   Abdominal: Soft. Bowel sounds are normal. There is no tenderness.   Musculoskeletal: Normal range of motion. He exhibits no edema or tenderness.   Fistula with good thrill on the Right radial area and L AC fossa   Neurological: He is alert and oriented to person, place, and time. No cranial nerve deficit.   Skin: Skin is  warm and dry. No rash noted.   Psychiatric: He has a normal mood and affect.   Nursing note and vitals reviewed.      Fluids    Intake/Output Summary (Last 24 hours) at 06/13/19 1709  Last data filed at 06/13/19 1221   Gross per 24 hour   Intake              950 ml   Output             2756 ml   Net            -1806 ml       Laboratory  Recent Labs      06/12/19   1444  06/13/19   0419   WBC  3.5*  4.6*   RBC  3.16*  3.17*   HEMOGLOBIN  10.3*  10.4*   HEMATOCRIT  33.6*  33.8*   MCV  106.3*  106.6*   MCH  32.6  32.8   MCHC  30.7*  30.8*   RDW  61.4*  61.9*   PLATELETCT  180  187   MPV  9.8  10.0     Recent Labs      06/12/19   1444  06/13/19   0419   SODIUM  140  139   POTASSIUM  4.6  4.3   CHLORIDE  97  99   CO2  24  25   GLUCOSE  108*  108*   BUN  92*  63*   CREATININE  11.42*  8.74*   CALCIUM  9.5  9.3     Recent Labs      06/12/19   1444  06/13/19   1622   INR  1.12  1.22*               Imaging  EC-JUSTIN W/O CONT   Final Result      DX-CHEST-PORTABLE (1 VIEW)   Final Result      1.  Cardiomegaly.      2.  Linear atelectasis within the right lung base.      CL-CARDIOVERSION    (Results Pending)        Assessment/Plan  * Atrial flutter (HCC)- (present on admission)   Assessment & Plan    -s/p DCCV, holding on tele last night  -concurrent EF 30%  -start toprol XL  -continue coumadin  -iHD to maintain euvolemia     Aortic stenosis, moderate- (present on admission)   Assessment & Plan    Further evaluate by cardiac echo  Narrow volume status     Pericardial effusion without cardiac tamponade- (present on admission)   Assessment & Plan    Seen on previous echo  Repeat echo shows smaller range, but technical differences persist     CHF (congestive heart failure), NYHA class II, chronic, systolic (HCC) E F30 in setting of atrial flutter- (present on admission)   Assessment & Plan    -JUSTIN today shows worsened EF, now 30%  -stopped dilt  -start toprol XL  -keep out of atrial flutter/fibrillation     Hypomagnesemia- (present  on admission)   Assessment & Plan    Keep > 2   Replaced      Chronic anticoagulation   Assessment & Plan    -no bridging needed  -start coumadin, goal INR 2-3, adjsut PRN, check dialy INR     Pulmonary hypertension (HCC)- (present on admission)   Assessment & Plan    Monitor volume status      Anemia in CKD (chronic kidney disease)- (present on admission)   Assessment & Plan    -stable at this time     End stage renal disease (HCC)- (present on admission)   Assessment & Plan    Dialysis Monday Wednesday Friday  Monitor electrolytes and hemoglobin  Nephrology is consulted  -appears to be tolerating well, monitor electrolytes     COPD (chronic obstructive pulmonary disease) ([facility])- (present on admission)   Assessment & Plan    Not in exacerbation  Albuterol inhaler PRN     EHLGA (obstructive sleep apnea)- (present on admission)   Assessment & Plan    Cpap  Has pulmonary htn     BPH (benign prostatic hypertrophy)- (present on admission)   Assessment & Plan    Doxazosin and flomax           VTE prophylaxis: coumadin, goal INR 2-3

## 2019-06-17 ENCOUNTER — ANTICOAGULATION VISIT (OUTPATIENT)
Dept: MEDICAL GROUP | Facility: MEDICAL CENTER | Age: 79
End: 2019-06-17
Payer: MEDICARE

## 2019-06-17 ENCOUNTER — TELEPHONE (OUTPATIENT)
Dept: VASCULAR LAB | Facility: MEDICAL CENTER | Age: 79
End: 2019-06-17

## 2019-06-17 DIAGNOSIS — Z79.01 CHRONIC ANTICOAGULATION: ICD-10-CM

## 2019-06-17 DIAGNOSIS — I48.92 ATRIAL FLUTTER, UNSPECIFIED TYPE (HCC): ICD-10-CM

## 2019-06-17 LAB
INR PPP: 1.7 (ref 2–3.5)
INR PPP: 1.7 (ref 2–3.5)

## 2019-06-17 PROCEDURE — 85610 PROTHROMBIN TIME: CPT | Performed by: INTERNAL MEDICINE

## 2019-06-17 PROCEDURE — 99211 OFF/OP EST MAY X REQ PHY/QHP: CPT | Performed by: INTERNAL MEDICINE

## 2019-06-17 NOTE — PROGRESS NOTES
OP Anticoagulation Service Note    Date: 2019  There were no vitals filed for this visit.    Anticoagulation Summary  As of 2019    INR goal:   2.0-3.0   TTR:   --   INR used for dosin.70! (2019)   Warfarin maintenance plan:   5 mg (5 mg x 1) every day   Weekly warfarin total:   35 mg   Plan last modified:   Annabel Lynn, PharmD (2019)   Next INR check:   2019   Target end date:   Indefinite    Indications    Atrial flutter (HCC) (Resolved) [I48.92]             Anticoagulation Episode Summary     INR check location:   [facility]    Preferred lab:       Send INR reminders to:       Comments:         Anticoagulation Care Providers     Provider Role Specialty Phone number    [facility]   [phone]    Martha Light M.D.  [facility] [phone]        Anticoagulation Patient Findings      HPI:   Parmjit Castelan is new to our services, on anticoagulation therapy with warfarin (a high risk medication) for atrial fibrillation     CHADS-VASC = 5  HAS - BLED = 3 (age, CKD, hx of bleeding)  (HTN >160, Renal Dz SCr >2.26, Liver Dz, stroke, prior major bleed or predisposition to bleeding, labile INR, Age >65, Meds, ETOH >/=8 per week)    Provided pt education on warfarin. Including how to take, what to do if missed dose, importance of INR monitoring, drug and food interactions. Patient is aware to avoid NSAIDs and ASA (unless directed by provider). Educated pt on s/s of bleeding and thrombosis. Patient is aware to seek medical attention for severe falls or injury to their heads, to report all medication changes to our office and to notify our office of unusual bleeding or bruising.     Medication reviewed and updated    Hx of bleeding - Yes, pt reports internal bleeding 2-3 years ago    Pt does not smoke  Pt is on dialysis, not a candidate for DOAC  Signed pt contract     A/P   INR  sub-therapeutic.   INR is increasing, will continue warfarin 5 mg  daily      Follow up appointment in 3 day(s) as pt is new to warfarin and will require close follow up.     Annabel Lynn, Pharm D

## 2019-06-18 NOTE — TELEPHONE ENCOUNTER
Initial anticoag note and most recent d/c summar7 reviewed.  Patient with afib and chads vasc = 5    Pending further recommendations, we will continue with indefinite anticoagulation with warfarin as directed by mame    Will defer all management of rhythm, rate, and other cv issues, aside from anticoagulation, to cards Michael Bloch, MD  Anticoagulation Clinic    Cc:  SEA Elliott

## 2019-06-20 ENCOUNTER — APPOINTMENT (OUTPATIENT)
Dept: MEDICAL GROUP | Facility: MEDICAL CENTER | Age: 79
End: 2019-06-20
Payer: MEDICARE

## 2019-06-24 ENCOUNTER — ANTICOAGULATION VISIT (OUTPATIENT)
Dept: MEDICAL GROUP | Facility: MEDICAL CENTER | Age: 79
End: 2019-06-24
Payer: MEDICARE

## 2019-06-24 DIAGNOSIS — Z79.01 CHRONIC ANTICOAGULATION: Primary | ICD-10-CM

## 2019-06-24 LAB — INR PPP: 1.9 (ref 2–3.5)

## 2019-06-24 PROCEDURE — 85610 PROTHROMBIN TIME: CPT | Performed by: INTERNAL MEDICINE

## 2019-06-24 PROCEDURE — 99211 OFF/OP EST MAY X REQ PHY/QHP: CPT | Performed by: INTERNAL MEDICINE

## 2019-06-24 NOTE — PROGRESS NOTES
OP Anticoagulation Service Note    Date: 2019      Anticoagulation Summary  As of 2019    INR goal:   2.0-3.0   TTR:   --   INR used for dosin.90! (2019)   Warfarin maintenance plan:   7.5 mg (5 mg x 1.5) every Mon; 5 mg (5 mg x 1) all other days   Weekly warfarin total:   37.5 mg   Plan last modified:   Annabel Lynn, PharmD (2019)   Next INR check:   2019   Target end date:   Indefinite    Indications    Atrial flutter (HCC) (Resolved) [I48.92]             Anticoagulation Episode Summary     INR check location:   [facility]    Preferred lab:       Send INR reminders to:       Comments:         Anticoagulation Care Providers     Provider Role Specialty Phone number    [facility]   [phone]    Martha Light M.D.  Family Medicine [phone]        Anticoagulation Patient Findings      HPI:   Parmjit Castelan seen in clinic today, on anticoagulation therapy with warfarin (a high risk medication) for atrial fibrillation, CHADS-VASC = 5      Pt is here today to evaluate anticoagulation therapy    Previous INR was  1.7 on 19    Pt was instructed to take 5 mg daily    Confirmed warfarin dosing regimen, denies missed or extra doses of coumadin.   Diet has been consistent with foods rich in vitamin K: Yes  Changes in ETOH:  No  Changes in smoking status: No  Changes in medication: No   Cost restriction: No  S/s of bleeding:  No  Falls or accidents since last visit No  Signs/symptoms  thrombosis since the last appt: No    A/P   INR  subtherapeutic today, will require dose adjust ment today to prevent stroke and closer follow up  Incresae weekly regimen. Provided pt SO for pt/INR to be done at dialysis     Pt reporting increased HR, no other complaints. Sees cardiology and PCP tomorrow.     Pt educated to contact our clinic with any changes in medications or s/s of bleeding or thrombosis. Pt is aware to seek immediate medical attention for falls, head  injury or deep cuts    Follow up appointment in 1 week(s) to reduce risk of adverse events from warfarin  Annabel Lynn, PharmD

## 2019-06-25 ENCOUNTER — OFFICE VISIT (OUTPATIENT)
Dept: CARDIOLOGY | Facility: MEDICAL CENTER | Age: 79
End: 2019-06-25
Payer: MEDICARE

## 2019-06-25 ENCOUNTER — OFFICE VISIT (OUTPATIENT)
Dept: MEDICAL GROUP | Facility: MEDICAL CENTER | Age: 79
End: 2019-06-25
Payer: MEDICARE

## 2019-06-25 VITALS
DIASTOLIC BLOOD PRESSURE: 68 MMHG | HEIGHT: 68 IN | OXYGEN SATURATION: 95 % | WEIGHT: 166.89 LBS | SYSTOLIC BLOOD PRESSURE: 116 MMHG | RESPIRATION RATE: 14 BRPM | HEART RATE: 109 BPM | TEMPERATURE: 98.2 F | BODY MASS INDEX: 25.29 KG/M2

## 2019-06-25 VITALS
WEIGHT: 167.44 LBS | HEART RATE: 96 BPM | SYSTOLIC BLOOD PRESSURE: 123 MMHG | DIASTOLIC BLOOD PRESSURE: 47 MMHG | BODY MASS INDEX: 25.38 KG/M2 | OXYGEN SATURATION: 95 % | HEIGHT: 68 IN

## 2019-06-25 DIAGNOSIS — I50.21 SYSTOLIC CHF, ACUTE (HCC): ICD-10-CM

## 2019-06-25 DIAGNOSIS — G47.33 OSA (OBSTRUCTIVE SLEEP APNEA): ICD-10-CM

## 2019-06-25 DIAGNOSIS — I73.9 PERIPHERAL VASCULAR DISEASE (HCC): ICD-10-CM

## 2019-06-25 DIAGNOSIS — I50.22 CHF (CONGESTIVE HEART FAILURE), NYHA CLASS II, CHRONIC, SYSTOLIC (HCC): ICD-10-CM

## 2019-06-25 DIAGNOSIS — I35.0 AORTIC STENOSIS, MODERATE: ICD-10-CM

## 2019-06-25 DIAGNOSIS — F51.01 PRIMARY INSOMNIA: ICD-10-CM

## 2019-06-25 DIAGNOSIS — N18.6 END STAGE RENAL DISEASE (HCC): ICD-10-CM

## 2019-06-25 DIAGNOSIS — Z79.01 CHRONIC ANTICOAGULATION: Chronic | ICD-10-CM

## 2019-06-25 DIAGNOSIS — R93.1 ELEVATED CORONARY ARTERY CALCIUM SCORE: ICD-10-CM

## 2019-06-25 DIAGNOSIS — E78.5 DYSLIPIDEMIA: ICD-10-CM

## 2019-06-25 DIAGNOSIS — I10 ESSENTIAL HYPERTENSION: ICD-10-CM

## 2019-06-25 DIAGNOSIS — I48.92 ATRIAL FLUTTER, UNSPECIFIED TYPE (HCC): ICD-10-CM

## 2019-06-25 PROBLEM — G47.34 NOCTURNAL HYPOXEMIA: Status: RESOLVED | Noted: 2017-03-21 | Resolved: 2019-06-25

## 2019-06-25 PROBLEM — E83.42 HYPOMAGNESEMIA: Status: RESOLVED | Noted: 2019-06-13 | Resolved: 2019-06-25

## 2019-06-25 PROBLEM — I27.20 PULMONARY HYPERTENSION (HCC): Status: RESOLVED | Noted: 2017-03-21 | Resolved: 2019-06-25

## 2019-06-25 LAB — EKG IMPRESSION: NORMAL

## 2019-06-25 PROCEDURE — 99214 OFFICE O/P EST MOD 30 MIN: CPT | Performed by: FAMILY MEDICINE

## 2019-06-25 PROCEDURE — 99214 OFFICE O/P EST MOD 30 MIN: CPT | Performed by: NURSE PRACTITIONER

## 2019-06-25 PROCEDURE — 93000 ELECTROCARDIOGRAM COMPLETE: CPT | Performed by: INTERNAL MEDICINE

## 2019-06-25 RX ORDER — METOPROLOL SUCCINATE 100 MG/1
100 TABLET, EXTENDED RELEASE ORAL EVERY EVENING
Qty: 30 TAB | Refills: 11 | Status: SHIPPED | OUTPATIENT
Start: 2019-06-25 | End: 2019-07-10 | Stop reason: SDUPTHER

## 2019-06-25 RX ORDER — TORSEMIDE 10 MG/1
10 TABLET ORAL DAILY
Qty: 30 TAB | Refills: 3 | Status: SHIPPED | OUTPATIENT
Start: 2019-06-25 | End: 2019-07-11 | Stop reason: SDUPTHER

## 2019-06-25 RX ORDER — ZOLPIDEM TARTRATE 10 MG/1
10 TABLET ORAL
Qty: 90 EACH | Refills: 0 | Status: SHIPPED | OUTPATIENT
Start: 2019-06-25 | End: 2019-09-13

## 2019-06-25 ASSESSMENT — ENCOUNTER SYMPTOMS
COUGH: 0
MYALGIAS: 0
DIZZINESS: 0
FEVER: 0
ORTHOPNEA: 1
PND: 0
CLAUDICATION: 0
PALPITATIONS: 0
ABDOMINAL PAIN: 0
SHORTNESS OF BREATH: 0

## 2019-06-25 ASSESSMENT — PATIENT HEALTH QUESTIONNAIRE - PHQ9: CLINICAL INTERPRETATION OF PHQ2 SCORE: 0

## 2019-06-25 NOTE — PROGRESS NOTES
"Chief Complaint   Patient presents with   • Atrial Flutter     Subjective:   Parmjit Castelan is a 78 y.o. male who presents today for hospital follow up post cardioversion for rapid aflutter.    He is a patient of Dr. Mendoza in our office.    Hx of CAD (no interventions), COPD with prior smoker, ESRD on HD, mod AS, HLD, HTN, peripheral arterial disease, and now with rEF of 30%.    He tells me he is having orthopnea and trouble breathing at night.    He is asymptomatic to his aflutter rates of 110 at rest.    His cardioversion was unsuccessful, his HR's upon discharge were elevated >120 at rest per documentation.    He has chronic claudication and pain in his legs.    Past Medical History:   Diagnosis Date   • Anesthesia     \"needs more sedation\" (can sometimes hear MD during procedure)    • Arterial leg ulcer (HCC) 11/8/2018   • Atrial flutter ([facility]) 6/12/2019   • Basal cell carcinoma of left cheek 3/26/2015   • BPH 7/14/2009   • Bronchitis 12/25/2018   • CAD (coronary artery disease) 2/20/2014   • CATARACT     sanjuanita surgery complete   • CHF (congestive heart failure), NYHA class II, chronic, systolic ([facility]) E F30 in setting of atrial flutter 6/13/2019   • Chronic anticoagulation 6/12/2019   • Chronic respiratory failure with hypoxia ([facility]) 5/8/2016   • CKD (chronic kidney disease) stage 4, GFR 15-29 ml/min ([facility]) 1/15/2010    end stage renal disease   • COPD (chronic obstructive pulmonary disease) ([facility])     wears 2.5 L o2 sometimes when he sleeps   • Detached retina    • Dialysis     m,w,f juliann   • EMPHYSEMA    • Gout    • Heart burn     occas   • Heart murmur     maria esther lee cardiologist   • High cholesterol    • HTN (hypertension) 1/15/2010   • Indigestion     occas   • Kidney cyst    • Leg pain, bilateral 8/10/2015   • On supplemental oxygen therapy    • Peripheral vascular disease (HCC) 8/10/2015   • Pneumonia    • Primary insomnia 3/22/2018   • Proteinuria    • Sleep apnea     O2 PER CANULA  AT NIGHT 2l/m   • " Snoring      Past Surgical History:   Procedure Laterality Date   • JUSTIN  6/13/2019    Procedure: ECHOCARDIOGRAM, TRANSESOPHAGEAL;  Surgeon: Harvinder Hoang M.D.;  Location: SURGERY [facility];  Service: Cardiac   • CARDIOVERSION  6/13/2019    Procedure: CARDIOVERSION;  Surgeon: Harvinder Hoang M.D.;  Location: [facility];  Service: Cardiac   • AV FISTULA CREATION Right 2/21/2019    Procedure: AV FISTULA CREATION - ARM;  Surgeon: David Cason M.D.;  Location: SURGERY [facility];  Service: General   • FEMORAL ENDARTERECTOMY Left 1/25/2019    Procedure: FEMORAL ENDARTERECTOMY;  Surgeon: David Cason M.D.;  Location: SURGERY [facility];  Service: General   • FEMORAL POPLITEAL BYPASS Left 1/25/2019    Procedure: LEFT FEMORAL POPLITEAL POLYTETRAFLUOROETHYLENE ePTFE(PROPATEN VASCULAR GRAFT) BYPASS;  Surgeon: David Cason M.D.;  Location: SURGERY [facility];  Service: General   • ANGIOGRAM Left 1/25/2019    Procedure: LEFT LEG ANGIOGRAM;  Surgeon: David Cason M.D.;  Location: SURGERY [facility];  Service: General   • ENDOSCOPY PROCEDURE  3/21/2018    Procedure: ENDOSCOPY PROCEDURE/UPPER;  Surgeon: Enrique Child D.O.;  Location: [facility];  Service: Gastroenterology   • COLONOSCOPY - ENDO  8/15/2016    Procedure: COLONOSCOPY - ENDO;  Surgeon: Mane Whatley M.D.;  Location: [facility];  Service:    • GASTROSCOPY WITH BIOPSY  8/13/2016    Procedure: GASTROSCOPY WITH BIOPSY;  Surgeon: Jorge Leavitt M.D.;  Location: ENDOSCOPY [facility];  Service:    • RECOVERY  12/23/2015    Procedure: VASCULAR CASE-CASON-LEFT ARM FISTULOGRAM WITH ANGIOPLASTY;  Surgeon: Elmo Surgery;  Location: SURGERY PRE-POST PROC UNIT [facility];  Service:    • RECOVERY  3/24/2015    Performed by [facility] at SURGERY PRE-POST PROC UNIT [facility]   • RECOVERY  7/29/2014    Performed by [facility] at SURGERY SAME DAY [facility]    • RECOVERY  3/24/2014    Performed by [facility] at SURGERY [facility] ORS   • RECOVERY  12/17/2013    Performed by [facility] at SURGERY SAME DAY [facility] ORS   • RECOVERY  7/2/2013    Performed by [facility] at SURGERY SAME DAY [facility] ORS   • AV FISTULA THROMBOLYSIS  7/2/2013    Performed by David Cason M.D. at SURGERY [facility]   • RECOVERY  1/29/2013    Performed by [facility] at SURGERY SAME DAY [facility] ORS   • RECOVERY  7/23/2012    Performed by SURGERY, [facility] at SURGERY SAME DAY [facility] ORS   • VITRECTOMY POSTERIOR  10/11/2011    Performed by NAHUM GE at SURGERY SAME DAY [facility] ORS   • RECOVERY  8/12/2011    Performed by SURGERY, IR-RECOVERY at SURGERY SAME DAY [facility] ORS   • VITRECTOMY POSTERIOR  1/18/2011    Performed by NAHUM GE at SURGERY SAME DAY [facility] ORS   • SCLERAL BUCKLING  1/18/2011    Performed by [facility] at SURGERY SAME DAY [facility] ORS   • AV FISTULOGRAM  9/17/2010    Performed by DAVID CASON at SURGERY [facility] ORS   • ANGIOPLASTY BALLOON  9/17/2010    Performed by DAVID CASON at SURGERY [facility] ORS   • AV FISTULOGRAM  7/23/2010    Performed by DAVID CASON at SURGERY [facility] ORS   • ANGIOPLASTY BALLOON  7/23/2010    Performed by DAVID CASON at SURGERY [facility] ORS   • AV FISTULA REVISION  2/19/2010    Performed by WILLIE HATCH at SURGERY [facility] ORS   • AV FISTULA CREATION  2/12/2010    Performed by DAVID CASON at SURGERY [facility]   • CATARACT PHACO WITH IOL  4/8/08    Performed by STACEY PHILLIPS at SURGERY SAME DAY [facility] ORS   • OTHER ORTHOPEDIC SURGERY  1998    right toe for facititis     Family History   Problem Relation Age of Onset   • Stroke Mother    • Hypertension Mother    • Lung Disease Father         Emphysema, resp failure   • Genitourinary () Maternal Aunt         hematuria   • Hypertension Brother      Social  History     Social History   • Marital status:      Spouse name: N/A   • Number of children: N/A   • Years of education: N/A     Occupational History   • Not on file.     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 90 Each 0   • metoprolol SR (TOPROL XL) 100 MG TABLET SR 24 HR Take 1 Tab by mouth every evening. 30 Tab 11   • torsemide (DEMADEX) 10 MG tablet Take 1 Tab by mouth every day. 30 Tab 3   • lisinopril (PRINIVIL) 5 MG Tab Take 1 Tab by mouth 2 Times a Day. 60 Tab 1   • benzonatate (TESSALON) 100 MG Cap Take 1 Cap by mouth 3 times a day as needed for Cough. 60 Cap 0   • warfarin (COUMADIN) 5 MG Tab Take 1 Tab by mouth every day. 30 Tab 3   • tamsulosin (FLOMAX) 0.4 MG capsule Take 0.8 mg by mouth every morning.     • omeprazole (PRILOSEC) 40 MG delayed-release capsule Take 40 mg by mouth every morning.     • traZODone (DESYREL) 150 MG Tab Take 300 mg by mouth every evening.     • calcium acetate (PHOS-LO) 667 MG Cap Take 2,001 mg by mouth 3 times a day, with meals. 2001mg three times a day with meals and 1334mg with snacks     • Fluticasone-Umeclidin-Vilant (TRELEGY ELLIPTA) 100-62.5-25 MCG/INH AEROSOL POWDER, BREATH ACTIVATED Inhale 1 Puff by mouth every day. Rinse mouth after use 1 Each 11   • levalbuterol (XOPENEX) 1.25 MG/3ML Nebu Soln 3 mL by Nebulization route every four hours as needed for Shortness of Breath. 120  "Bullet 11   • doxazosin (CARDURA) 4 MG Tab Take 4 mg by mouth every evening.     • pravastatin (PRAVACHOL) 20 MG Tab Take 20 mg by mouth every evening.     • ROPINIRole (REQUIP) 0.5 MG Tab Take 1 mg by mouth every bedtime.     • [DISCONTINUED] metoprolol SR (TOPROL XL) 50 MG TABLET SR 24 HR Take 1 Tab by mouth every day. 30 Tab 1   • [DISCONTINUED] albuterol (PROAIR HFA) 108 (90 Base) MCG/ACT Aero Soln inhalation aerosol Inhale 2 Puffs by mouth every four hours as needed for Shortness of Breath (wheezing). (Patient not taking: Reported on 6/25/2019) 1 Inhaler 11   • [DISCONTINUED] furosemide (LASIX) 80 MG Tab Take 80 mg by mouth 2 Times a Day.     • [DISCONTINUED] fluticasone (FLONASE) 50 MCG/ACT nasal spray Spray 2 Sprays in nose every day.       No facility-administered encounter medications on file as of 6/25/2019.      Review of Systems   Constitutional: Positive for malaise/fatigue. Negative for fever.   Respiratory: Negative for cough and shortness of breath.    Cardiovascular: Positive for orthopnea and leg swelling. Negative for chest pain, palpitations, claudication and PND.   Gastrointestinal: Negative for abdominal pain.   Musculoskeletal: Negative for myalgias.   Neurological: Negative for dizziness.        Objective:   /47 (BP Location: Right leg, Patient Position: Supine, BP Cuff Size: Adult)   Pulse 96   Ht 1.727 m (5' 8\")   Wt 76 kg (167 lb 7 oz)   SpO2 95%   BMI 25.46 kg/m²     Physical Exam   Constitutional: He appears well-developed. He appears ill.   HENT:   Head: Normocephalic and atraumatic.   Eyes: EOM are normal.   Neck: No JVD present.   Cardiovascular: Normal heart sounds and intact distal pulses.  A regularly irregular rhythm present. Tachycardia present.    Pulmonary/Chest: Effort normal and breath sounds normal.   Musculoskeletal: Normal range of motion. He exhibits edema.   Mild edema in legs   Skin: Skin is warm and dry.   Psychiatric: He has a normal mood and affect. "   Nursing note and vitals reviewed.      Assessment:     1. Atrial flutter, unspecified type (HCC)  EKG    REFERRAL TO CARDIAC ELECTROPHYSIOLOGY    MAGNESIUM    THYROID PANEL WITH TSH   2. Aortic stenosis, moderate     3. Systolic CHF, acute (HCC)  Comp Metabolic Panel    BTYPE NATRIURETIC PEPTIDE   4. Dyslipidemia     5. Chronic anticoagulation  CBC WITHOUT DIFFERENTIAL   6. HELGA (obstructive sleep apnea)     7. Essential hypertension     8. Elevated coronary artery calcium score     9. Peripheral vascular disease (HCC)       Medical Decision Making:  Today's Assessment / Status / Plan:     1. CAD  -no angina  -consider cath in future with new reduced EF, although more likely related to aflutter with RVR  -cont statin    2. Moderate AS in the setting of EF 30%  -prior echo in '18 showing mod  -no angina, ARIAS, or lightheadedness but HF symptoms of volume overload present  -refer to valve clinic for consideration of low flow severe AS    3. Systolic HFrEF of 30% (new onset)  -volume overloaded, > 10 lbs over dry weight  -switch from furosemide to torsemide 10 mg   -cont lisinopril, toprol  -follow symptoms clinically  -check labs in 1-2 weeks with med changes  -discussed HF education, will most likely need further education at some point    4. PVD  -concern for worsening claudication  -seen by vascular surgeon, patient states there is no more opportunities for interventions  -med management with statin, consider adding ASA    5. Aflutter with RVR  -remains tachycardic  -increase toprol to 100 mg QPM  -consider amiodarone at next apt but would prefer ablation  -cont coumadin for OAC    6. HELGA and COPD with prior smoking  -smoking cessation  -followed by pulmonary    FU in clinic in 1-2 weeks with EP for aflutter RVR; 3 months valve clinic; 1 month with HF clinic    Patient verbalizes understanding and agrees with the plan of care.     Collaborating MD: Skyla MAIER

## 2019-06-25 NOTE — ASSESSMENT & PLAN NOTE
This is a chronic health problem for this patient for which she utilizes Ambien 10 mg at bedtime.  Right now he is out of that medication.  He is been having to use trazodone going up to 300 mg and finding it does not help him sleep.  He is feeling tired which could be also from the fact that he had atrial flutter and has dropped his ejection fraction down to 30%.  He is seeing cardiology later today.  He will talk with them about the A. Fib? a flutter and what are the next steps.  Obtained and reviewed patient utilization report from [facility] pharmacy database on 6/25/2019 11:00 AM  prior to writing prescription for controlled substance II, III or IV per [place] bill . Based on assessment of the report,my physical exam if necessary and the patient's health problem, the prescription is medically necessary.

## 2019-06-25 NOTE — LETTER
"     [facility]   [address], [address]  [place] [address]  Phone: [phone]  Fax: [phone]              Parmjit Castelan  1940    Encounter Date: 6/25/2019    ILAN Diaz          PROGRESS NOTE:  Chief Complaint   Patient presents with   • Atrial Flutter     Subjective:   Parmjit Castelan is a 78 y.o. male who presents today for hospital follow up post cardioversion for rapid aflutter.    He is a patient of Dr. Mendoza in our office.    Hx of CAD (no interventions), COPD with prior smoker, ESRD on HD, mod AS, HLD, HTN, peripheral arterial disease, and now with rEF of 30%.    He tells me he is having orthopnea and trouble breathing at night.    He is asymptomatic to his aflutter rates of 110 at rest.    His cardioversion was unsuccessful, his HR's upon discharge were elevated >120 at rest per documentation.    He has chronic claudication and pain in his legs.    Past Medical History:   Diagnosis Date   • Anesthesia     \"needs more sedation\" (can sometimes hear MD during procedure)    • Arterial leg ulcer (HCC) 11/8/2018   • Atrial flutter ([facility]) 6/12/2019   • Basal cell carcinoma of left cheek 3/26/2015   • BPH 7/14/2009   • Bronchitis 12/25/2018   • CAD (coronary artery disease) 2/20/2014   • CATARACT     sanjuanita surgery complete   • CHF (congestive heart failure), NYHA class II, chronic, systolic ([facility]) E F30 in setting of atrial flutter 6/13/2019   • Chronic anticoagulation 6/12/2019   • Chronic respiratory failure with hypoxia ([facility]) 5/8/2016   • CKD (chronic kidney disease) stage 4, GFR 15-29 ml/min ([facility]) 1/15/2010    end stage renal disease   • COPD (chronic obstructive pulmonary disease) ([facility])     wears 2.5 L o2 sometimes when he sleeps   • Detached retina    • Dialysis     m,w,f davita   • EMPHYSEMA    • Gout    • Heart burn     occas   • Heart murmur     maria esther lee cardiologist   • High cholesterol    • HTN (hypertension) 1/15/2010   • " Indigestion     occas   • Kidney cyst    • Leg pain, bilateral 8/10/2015   • On supplemental oxygen therapy    • Peripheral vascular disease (HCC) 8/10/2015   • Pneumonia    • Primary insomnia 3/22/2018   • Proteinuria    • Sleep apnea     O2 PER CANULA  AT NIGHT 2l/m   • Snoring      Past Surgical History:   Procedure Laterality Date   • JUSTIN  6/13/2019    Procedure: ECHOCARDIOGRAM, TRANSESOPHAGEAL;  Surgeon: Harvinder Hoang M.D.;  Location: [facility];  Service: Cardiac   • CARDIOVERSION  6/13/2019    Procedure: CARDIOVERSION;  Surgeon: Harvinder Hoang M.D.;  Location: [facility];  Service: Cardiac   • AV FISTULA CREATION Right 2/21/2019    Procedure: AV FISTULA CREATION - ARM;  Surgeon: David Cason M.D.;  Location: SURGERY [facility];  Service: General   • FEMORAL ENDARTERECTOMY Left 1/25/2019    Procedure: FEMORAL ENDARTERECTOMY;  Surgeon: David Cason M.D.;  Location: SURGERY [facility];  Service: General   • FEMORAL POPLITEAL BYPASS Left 1/25/2019    Procedure: LEFT FEMORAL POPLITEAL POLYTETRAFLUOROETHYLENE ePTFE(PROPATEN VASCULAR GRAFT) BYPASS;  Surgeon: David Cason M.D.;  Location: SURGERY [facility];  Service: General   • ANGIOGRAM Left 1/25/2019    Procedure: LEFT LEG ANGIOGRAM;  Surgeon: David Cason M.D.;  Location: SURGERY [facility];  Service: General   • ENDOSCOPY PROCEDURE  3/21/2018    Procedure: ENDOSCOPY PROCEDURE/UPPER;  Surgeon: Enrique Child D.O.;  Location: SURGERY [facility];  Service: Gastroenterology   • COLONOSCOPY - ENDO  8/15/2016    Procedure: COLONOSCOPY - ENDO;  Surgeon: Mane Whatley M.D.;  Location: ENDOSCOPY [facility];  Service:    • GASTROSCOPY WITH BIOPSY  8/13/2016    Procedure: GASTROSCOPY WITH BIOPSY;  Surgeon: Jorge Leavitt M.D.;  Location: ENDOSCOPY [facility];  Service:    • RECOVERY  12/23/2015    Procedure: VASCULAR CASE-CASON-LEFT ARM FISTULOGRAM WITH ANGIOPLASTY;   Surgeon: Recoveryonly Surgery;  Location: SURGERY PRE-POST PROC UNIT [facility];  Service:    • RECOVERY  3/24/2015    Performed by [facility] Surgery at SURGERY PRE-POST PROC UNIT [facility]   • RECOVERY  7/29/2014    Performed by Ir-Recovery Surgery at SURGERY SAME DAY [facility] ORS   • RECOVERY  3/24/2014    Performed by [facility] at SURGERY [facility]   • RECOVERY  12/17/2013    Performed by [facility]overy Surgery at SURGERY SAME DAY [facility] ORS   • RECOVERY  7/2/2013    Performed by [facility] at SURGERY SAME DAY [facility] ORS   • AV FISTULA THROMBOLYSIS  7/2/2013    Performed by David Cason M.D. at SURGERY [facility]   • RECOVERY  1/29/2013    Performed by [facility]Recovery Surgery at SURGERY SAME DAY [facility] ORS   • RECOVERY  7/23/2012    Performed by SURGERY, IR-RECOVERY at SURGERY SAME DAY [facility] ORS   • VITRECTOMY POSTERIOR  10/11/2011    Performed by NAHUM GE at SURGERY SAME DAY [facility] ORS   • RECOVERY  8/12/2011    Performed by SURGERY, IR-RECOVERY at SURGERY SAME DAY [facility] ORS   • VITRECTOMY POSTERIOR  1/18/2011    Performed by NAHUM GE at SURGERY SAME DAY [facility] ORS   • SCLERAL BUCKLING  1/18/2011    Performed by NAHUM GE at SURGERY SAME DAY [facility] ORS   • AV FISTULOGRAM  9/17/2010    Performed by DAVID CASON at SURGERY [facility] ORS   • ANGIOPLASTY BALLOON  9/17/2010    Performed by DAVID CASON at SURGERY [facility] ORS   • AV FISTULOGRAM  7/23/2010    Performed by DAVID CASON at SURGERY [facility] ORS   • ANGIOPLASTY BALLOON  7/23/2010    Performed by DAVID CASON at SURGERY [facility] ORS   • AV FISTULA REVISION  2/19/2010    Performed by WILLIE HATCH at SURGERY [facility] ORS   • AV FISTULA CREATION  2/12/2010    Performed by DAVID CASON at SURGERY [facility]   • CATARACT PHACO WITH IOL  4/8/08    Performed by STACEY PHILLIPS at SURGERY SAME DAY [facility]   • OTHER ORTHOPEDIC SURGERY  1998     right toe for facititis     Family History   Problem Relation Age of Onset   • Stroke Mother    • Hypertension Mother    • Lung Disease Father         Emphysema, resp failure   • Genitourinary () Maternal Aunt         hematuria   • Hypertension Brother      Social History     Social History   • Marital status:      Spouse name: N/A   • Number of children: N/A   • Years of education: N/A     Occupational History   • Not on file.     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 90 Each 0   • metoprolol SR (TOPROL XL) 100 MG TABLET SR 24 HR Take 1 Tab by mouth every evening. 30 Tab 11   • torsemide (DEMADEX) 10 MG tablet Take 1 Tab by mouth every day. 30 Tab 3   • lisinopril (PRINIVIL) 5 MG Tab Take 1 Tab by mouth 2 Times a Day. 60 Tab 1   • benzonatate (TESSALON) 100 MG Cap Take 1 Cap by mouth 3 times a day as needed for Cough. 60 Cap 0   • warfarin (COUMADIN) 5 MG Tab Take 1 Tab by mouth every day. 30 Tab 3   • tamsulosin (FLOMAX) 0.4 MG capsule Take 0.8 mg by mouth every morning.     • omeprazole (PRILOSEC) 40 MG delayed-release capsule Take 40 mg by mouth every morning.     • traZODone (DESYREL) 150 MG Tab Take 300 mg by mouth every evening.     • calcium acetate (PHOS-LO) 667 MG Cap Take 2,001 mg by mouth 3 times a day, with meals. 2001mg three times a day with meals and 1334mg with snacks     • Fluticasone-Umeclidin-Vilant  "(TRELEGY ELLIPTA) 100-62.5-25 MCG/INH AEROSOL POWDER, BREATH ACTIVATED Inhale 1 Puff by mouth every day. Rinse mouth after use 1 Each 11   • levalbuterol (XOPENEX) 1.25 MG/3ML Nebu Soln 3 mL by Nebulization route every four hours as needed for Shortness of Breath. 120 Bullet 11   • doxazosin (CARDURA) 4 MG Tab Take 4 mg by mouth every evening.     • pravastatin (PRAVACHOL) 20 MG Tab Take 20 mg by mouth every evening.     • ROPINIRole (REQUIP) 0.5 MG Tab Take 1 mg by mouth every bedtime.     • [DISCONTINUED] metoprolol SR (TOPROL XL) 50 MG TABLET SR 24 HR Take 1 Tab by mouth every day. 30 Tab 1   • [DISCONTINUED] albuterol (PROAIR HFA) 108 (90 Base) MCG/ACT Aero Soln inhalation aerosol Inhale 2 Puffs by mouth every four hours as needed for Shortness of Breath (wheezing). (Patient not taking: Reported on 6/25/2019) 1 Inhaler 11   • [DISCONTINUED] furosemide (LASIX) 80 MG Tab Take 80 mg by mouth 2 Times a Day.     • [DISCONTINUED] fluticasone (FLONASE) 50 MCG/ACT nasal spray Spray 2 Sprays in nose every day.       No facility-administered encounter medications on file as of 6/25/2019.      Review of Systems   Constitutional: Positive for malaise/fatigue. Negative for fever.   Respiratory: Negative for cough and shortness of breath.    Cardiovascular: Positive for orthopnea and leg swelling. Negative for chest pain, palpitations, claudication and PND.   Gastrointestinal: Negative for abdominal pain.   Musculoskeletal: Negative for myalgias.   Neurological: Negative for dizziness.        Objective:   /47 (BP Location: Right leg, Patient Position: Supine, BP Cuff Size: Adult)   Pulse 96   Ht 1.727 m (5' 8\")   Wt 76 kg (167 lb 7 oz)   SpO2 95%   BMI 25.46 kg/m²      Physical Exam   Constitutional: He appears well-developed. He appears ill.   HENT:   Head: Normocephalic and atraumatic.   Eyes: EOM are normal.   Neck: No JVD present.   Cardiovascular: Normal heart sounds and intact distal pulses.  A regularly " irregular rhythm present. Tachycardia present.    Pulmonary/Chest: Effort normal and breath sounds normal.   Musculoskeletal: Normal range of motion. He exhibits edema.   Mild edema in legs   Skin: Skin is warm and dry.   Psychiatric: He has a normal mood and affect.   Nursing note and vitals reviewed.      Assessment:     1. Atrial flutter, unspecified type (HCC)  EKG    REFERRAL TO CARDIAC ELECTROPHYSIOLOGY    MAGNESIUM    THYROID PANEL WITH TSH   2. Aortic stenosis, moderate     3. Systolic CHF, acute (HCC)  Comp Metabolic Panel    BTYPE NATRIURETIC PEPTIDE   4. Dyslipidemia     5. Chronic anticoagulation  CBC WITHOUT DIFFERENTIAL   6. HELGA (obstructive sleep apnea)     7. Essential hypertension     8. Elevated coronary artery calcium score     9. Peripheral vascular disease (HCC)       Medical Decision Making:  Today's Assessment / Status / Plan:     1. CAD  -no angina  -consider cath in future with new reduced EF, although more likely related to aflutter with RVR  -cont statin    2. Moderate AS in the setting of EF 30%  -prior echo in '18 showing mod  -no angina, ARIAS, or lightheadedness but HF symptoms of volume overload present  -refer to valve clinic for consideration of low flow severe AS    3. Systolic HFrEF of 30% (new onset)  -volume overloaded, > 10 lbs over dry weight  -switch from furosemide to torsemide 10 mg   -cont lisinopril, toprol  -follow symptoms clinically  -check labs in 1-2 weeks with med changes  -discussed HF education, will most likely need further education at some point    4. PVD  -concern for worsening claudication  -seen by vascular surgeon, patient states there is no more opportunities for interventions  -med management with statin, consider adding ASA    5. Aflutter with RVR  -remains tachycardic  -increase toprol to 100 mg QPM  -consider amiodarone at next apt but would prefer ablation  -cont coumadin for OAC    6. HELGA and COPD with prior smoking  -smoking cessation  -followed by  pulmonary    FU in clinic in 1-2 weeks with EP for aflutter RVR; 3 months valve clinic; 1 month with HF clinic    Patient verbalizes understanding and agrees with the plan of care.     Collaborating MD: Skyla MAIER          No Recipients

## 2019-06-25 NOTE — ASSESSMENT & PLAN NOTE
Patient continues on dialysis Monday, Wednesday and Friday.  His weight is up a little bit.  He believes he gained some weight while he was in the hospital but also was dialyzed last night and is at his appropriate weight after dialysis.

## 2019-06-25 NOTE — ASSESSMENT & PLAN NOTE
This is a continued problem for this patient that does not seem to be progressing.  He was hospitalized had another echocardiogram because of an episode of atrial flutter.  He was able to be cardioverted.  He was feeling short of breath while he was in the flutter.  He did not have chest pain he does not describe near syncope.

## 2019-06-25 NOTE — ASSESSMENT & PLAN NOTE
This is a chronic health problem for this patient that required him to be hospitalized in earlier June.  He has a follow-up with cardiology later today.  His heart rate is a little high at 109 after walking up here.  I am concerned that he needs an ablation.  Patient I spent some time discussing this.  He really does not have a choice about putting it off.  He is seeing cardiology later today and will discuss this further with them.

## 2019-06-25 NOTE — PATIENT INSTRUCTIONS
We will change your furosemide to torsemide 10 mg every morning.     We will increase your metoprolol to 100 mg in the evening.    Watch your BP, HR, weight, and pulse oximeter at home daily and keep a log.    We will obtain lab work in 1- 2 weeks, non-fasting.    We will arrange for a follow up in EP clinic within a few weeks to discuss plan of care for your aflutter.    I will also arrange an appointment in a few months with the valve doctor.

## 2019-06-25 NOTE — PROGRESS NOTES
CC:Diagnoses of Aortic stenosis, moderate, Primary insomnia, CHF (congestive heart failure), NYHA class II, chronic, systolic (HCC) E F30 in setting of atrial flutter, and End stage renal disease (HCC) were pertinent to this visit.    HISTORY OF PRESENT ILLNESS: Patient is a 78 y.o. male established patient who presents today to talk about his recent hospitalization along with his chronic health problems.  This patient was in critical condition when he was admitted to the hospital with atrial flutter and a heart rate in the 150s.  He was able to be cardioverted but there is a discussion that he is going to need to have a full cardioversion in the future with an ablation.  He is seeing cardiology later today.  We spent some time talking about how critical his cardiac health is at this time.    Health Maintenance: Completed    Aortic stenosis, moderate  This is a continued problem for this patient that does not seem to be progressing.  He was hospitalized had another echocardiogram because of an episode of atrial flutter.  He was able to be cardioverted.  He was feeling short of breath while he was in the flutter.  He did not have chest pain he does not describe near syncope.    Primary insomnia  This is a chronic health problem for this patient for which she utilizes Ambien 10 mg at bedtime.  Right now he is out of that medication.  He is been having to use trazodone going up to 300 mg and finding it does not help him sleep.  He is feeling tired which could be also from the fact that he had atrial flutter and has dropped his ejection fraction down to 30%.  He is seeing cardiology later today.  He will talk with them about the A. Fib? a flutter and what are the next steps.  Obtained and reviewed patient utilization report from [facility] pharmacy database on 6/25/2019 11:00 AM  prior to writing prescription for controlled substance II, III or IV per [place] bill . Based on assessment of the report,my physical  exam if necessary and the patient's health problem, the prescription is medically necessary.       CHF (congestive heart failure), NYHA class II, chronic, systolic (HCC) E F30 in setting of atrial flutter  This is a chronic health problem for this patient that required him to be hospitalized in earlier June.  He has a follow-up with cardiology later today.  His heart rate is a little high at 109 after walking up here.  I am concerned that he needs an ablation.  Patient I spent some time discussing this.  He really does not have a choice about putting it off.  He is seeing cardiology later today and will discuss this further with them.    End stage renal disease (HCC)  Patient continues on dialysis Monday, Wednesday and Friday.  His weight is up a little bit.  He believes he gained some weight while he was in the hospital but also was dialyzed last night and is at his appropriate weight after dialysis.      Patient Active Problem List    Diagnosis Date Noted   • Elevated coronary artery calcium score 02/20/2014     Priority: High   • Aortic stenosis, moderate 04/20/2017     Priority: Medium   • Dyslipidemia 12/05/2014     Priority: Low   • BPH (benign prostatic hypertrophy) 07/14/2009     Priority: Low   • CHF (congestive heart failure), NYHA class II, chronic, systolic (HCC) E F30 in setting of atrial flutter 06/13/2019   • Chronic anticoagulation 06/12/2019   • Rash 03/07/2019   • History of basal cell carcinoma (BCC) 03/07/2019   • Arterial leg ulcer (HCC) 11/08/2018   • Acute gastric ulcer 10/19/2018   • Primary insomnia 03/22/2018   • Gastric polyps 03/21/2018   • Esophagitis 03/21/2018   • Uremia 03/20/2018   • Pulmonary hypertension (HCC) 03/21/2017   • Pedal edema 03/21/2017   • AVM (arteriovenous malformation) 09/08/2016   • Systolic CHF, acute (HCC) 05/11/2016   • Essential hypertension 05/11/2016   • Hyperkalemia 05/11/2016   • Renal cyst 05/11/2016   • Chronic respiratory failure with hypoxia (HCC)  05/08/2016   • Hyperkalemia, diminished renal excretion 05/08/2016   • Pulmonary edema 05/08/2016   • Leukocytosis 05/08/2016   • ESRD (end stage renal disease) on dialysis ([facility]) 05/08/2016   • Uric acid arthropathy 01/07/2016   • Primary osteoarthritis of both hands 08/20/2015   • Peripheral vascular disease ([facility]) 08/10/2015   • Anemia in CKD (chronic kidney disease) 05/29/2015   • End stage renal disease ([facility]) 03/24/2014   • 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[facility]) 07/02/2013   • 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    Current Outpatient Prescriptions   Medication Sig Dispense Refill   • zolpidem (AMBIEN) 10 MG Tab Take 1 Tab by mouth every bedtime for 90 days. 90 Each 0   • lisinopril (PRINIVIL) 5 MG Tab Take 1 Tab by mouth 2 Times a Day. 60 Tab 1   • benzonatate (TESSALON) 100 MG Cap Take 1 Cap by mouth 3 times a day as needed for Cough. 60 Cap 0   • metoprolol SR (TOPROL XL) 50 MG TABLET SR 24 HR Take 1 Tab by mouth every day. 30 Tab 1   • warfarin (COUMADIN) 5 MG Tab Take 1 Tab by mouth every day. 30 Tab 3   • tamsulosin (FLOMAX) 0.4 MG capsule Take 0.8 mg by mouth every morning.     • omeprazole (PRILOSEC) 40 MG delayed-release capsule Take 40 mg by mouth every morning.     • traZODone (DESYREL) 150 MG Tab Take 300 mg by mouth every evening.     • calcium acetate (PHOS-LO) 667 MG Cap Take 2,001 mg by mouth 3 times a day, with meals. 2001mg three times a day with meals and 1334mg with snacks     • Fluticasone-Umeclidin-Vilant (TRELEGY ELLIPTA) 100-62.5-25 MCG/INH AEROSOL POWDER, BREATH ACTIVATED Inhale 1 Puff by mouth every day. Rinse mouth after use 1 Each 11   • albuterol (PROAIR HFA) 108 (90 Base) MCG/ACT Aero Soln inhalation aerosol Inhale 2 Puffs by mouth every four hours as needed for Shortness of Breath (wheezing). 1  Inhaler 11   • levalbuterol (XOPENEX) 1.25 MG/3ML Nebu Soln 3 mL by Nebulization route every four hours as needed for Shortness of Breath. 120 Bullet 11   • furosemide (LASIX) 80 MG Tab Take 80 mg by mouth 2 Times a Day.     • fluticasone (FLONASE) 50 MCG/ACT nasal spray Spray 2 Sprays in nose every day.     • doxazosin (CARDURA) 4 MG Tab Take 4 mg by mouth every evening.     • pravastatin (PRAVACHOL) 20 MG Tab Take 20 mg by mouth every evening.     • ROPINIRole (REQUIP) 0.5 MG Tab Take 1 mg by mouth every bedtime.       No current facility-administered medications for this visit.        Social History   Substance Use Topics   • Smoking status: Former Smoker     Packs/day: 1.00     Years: 40.00     Types: Cigarettes     Quit date: 1/1/2009   • Smokeless tobacco: Never Used      Comment: 1 pk a day for 35 yrs, QUIT JAN 1 2010   • Alcohol use No     Social History     Social History Narrative   • No narrative on file       Family History   Problem Relation Age of Onset   • Stroke Mother    • Hypertension Mother    • Lung Disease Father         Emphysema, resp failure   • Genitourinary () Maternal Aunt         hematuria   • Hypertension Brother         ROS:     - Constitutional: Patient complaining of fatigue ever since he was in the hospital early June.     - HEENT:  Negative for headaches, vision changes, hearing changes, ear pain, ear discharge, rhinorrhea, sinus congestion, sore throat, and neck pain.      - Respiratory: Patient finds he has some shortness of breath without cough or congestion.       - Cardiovascular: Negative for chest pain, palpitations, orthopnea, and bilateral lower extremity edema.     - Gastrointestinal: Negative for heartburn, nausea, vomiting, abdominal pain, hematochezia, melena, diarrhea, constipation, and greasy/foul-smelling stools.     - Genitourinary: Negative for dysuria, polyuria, hematuria, pyuria, urinary urgency, and urinary incontinence.     - Musculoskeletal: Patient with  "claudication in his lower extremities bilaterally causing pain on a daily basis.      - Skin: Negative for rash, itching, cyanotic skin color change.     - Neurological: Negative for dizziness, tingling, tremors, focal sensory deficit, focal weakness and headaches.     - Endo/Heme/Allergies: Patient currently on anticoagulation  - Psychiatric/Behavioral: Negative for depression, suicidal/homicidal ideation and memory loss.          - NOTE: All other systems reviewed and are negative, except as in HPI.      Exam:    /68 (BP Location: Left leg, Patient Position: Sitting, BP Cuff Size: Adult)   Pulse (!) 109   Temp 36.8 °C (98.2 °F) (Temporal)   Resp 14   Ht 1.727 m (5' 8\")   Wt 75.7 kg (166 lb 14.2 oz)   SpO2 95%  Body mass index is 25.38 kg/m².    General:  Well nourished, well developed male in NAD  Head is grossly normal.  Patient was seen for 25 minutes face to face of which, 20 minutes was spent counseling regarding review of recent hospitalization and the situation with his heart.  We reviewed his echocardiogram and how it shows a decrease in his ejection fraction from 55% to 30% as well as the hypokinesis which is global at this time.  We discussed options for the future..      Please note that this dictation was created using voice recognition software. I have made every reasonable attempt to correct obvious errors, but I expect that there are errors of grammar and possibly content that I did not discover before finalizing the note.    Assessment/Plan:  1. Aortic stenosis, moderate  Stable, we will continue to follow along with cardiology.    2. Primary insomnia  Uncontrolled, we will refill the patient's zolpidem.  He is due for refill at this time.  - zolpidem (AMBIEN) 10 MG Tab; Take 1 Tab by mouth every bedtime for 90 days.  Dispense: 90 Each; Refill: 0    3. CHF (congestive heart failure), NYHA class II, chronic, systolic (HCC) E F30 in setting of atrial flutter  Table, patient continues to " follow with cardiology.    4. End stage renal disease (HCC)  Stable, patient continues to follow with nephrology.

## 2019-06-28 ENCOUNTER — HOSPITAL ENCOUNTER (EMERGENCY)
Facility: MEDICAL CENTER | Age: 79
End: 2019-06-28
Attending: EMERGENCY MEDICINE
Payer: MEDICARE

## 2019-06-28 VITALS
HEIGHT: 68 IN | WEIGHT: 169.09 LBS | DIASTOLIC BLOOD PRESSURE: 55 MMHG | SYSTOLIC BLOOD PRESSURE: 133 MMHG | TEMPERATURE: 97.8 F | BODY MASS INDEX: 25.63 KG/M2 | RESPIRATION RATE: 18 BRPM | OXYGEN SATURATION: 91 % | HEART RATE: 79 BPM

## 2019-06-28 DIAGNOSIS — N18.6 ESRD (END STAGE RENAL DISEASE) (HCC): ICD-10-CM

## 2019-06-28 DIAGNOSIS — T82.838A BLEEDING FROM DIALYSIS SHUNT, INITIAL ENCOUNTER (HCC): ICD-10-CM

## 2019-06-28 DIAGNOSIS — R79.1 ELEVATED INR: ICD-10-CM

## 2019-06-28 LAB
ALBUMIN SERPL BCP-MCNC: 3.4 G/DL (ref 3.2–4.9)
ALBUMIN/GLOB SERPL: 1 G/DL
ALP SERPL-CCNC: 124 U/L (ref 30–99)
ALT SERPL-CCNC: 16 U/L (ref 2–50)
ANION GAP SERPL CALC-SCNC: 19 MMOL/L (ref 0–11.9)
APTT PPP: 51.7 SEC (ref 24.7–36)
AST SERPL-CCNC: 13 U/L (ref 12–45)
BASOPHILS # BLD AUTO: 0.6 % (ref 0–1.8)
BASOPHILS # BLD: 0.02 K/UL (ref 0–0.12)
BILIRUB SERPL-MCNC: 0.8 MG/DL (ref 0.1–1.5)
BUN SERPL-MCNC: 86 MG/DL (ref 8–22)
CALCIUM SERPL-MCNC: 9 MG/DL (ref 8.4–10.2)
CHLORIDE SERPL-SCNC: 96 MMOL/L (ref 96–112)
CO2 SERPL-SCNC: 23 MMOL/L (ref 20–33)
CREAT SERPL-MCNC: 10.82 MG/DL (ref 0.5–1.4)
EOSINOPHIL # BLD AUTO: 0.07 K/UL (ref 0–0.51)
EOSINOPHIL NFR BLD: 2.1 % (ref 0–6.9)
ERYTHROCYTE [DISTWIDTH] IN BLOOD BY AUTOMATED COUNT: 56.8 FL (ref 35.9–50)
GLOBULIN SER CALC-MCNC: 3.5 G/DL (ref 1.9–3.5)
GLUCOSE SERPL-MCNC: 111 MG/DL (ref 65–99)
HCT VFR BLD AUTO: 30.3 % (ref 42–52)
HGB BLD-MCNC: 9.5 G/DL (ref 14–18)
IMM GRANULOCYTES # BLD AUTO: 0 K/UL (ref 0–0.11)
IMM GRANULOCYTES NFR BLD AUTO: 0 % (ref 0–0.9)
INR PPP: 3.01 (ref 0.87–1.13)
LYMPHOCYTES # BLD AUTO: 0.33 K/UL (ref 1–4.8)
LYMPHOCYTES NFR BLD: 10 % (ref 22–41)
MCH RBC QN AUTO: 32 PG (ref 27–33)
MCHC RBC AUTO-ENTMCNC: 31.4 G/DL (ref 33.7–35.3)
MCV RBC AUTO: 102 FL (ref 81.4–97.8)
MONOCYTES # BLD AUTO: 0.39 K/UL (ref 0–0.85)
MONOCYTES NFR BLD AUTO: 11.9 % (ref 0–13.4)
NEUTROPHILS # BLD AUTO: 2.48 K/UL (ref 1.82–7.42)
NEUTROPHILS NFR BLD: 75.4 % (ref 44–72)
NRBC # BLD AUTO: 0 K/UL
NRBC BLD-RTO: 0 /100 WBC
PLATELET # BLD AUTO: 161 K/UL (ref 164–446)
PMV BLD AUTO: 9.5 FL (ref 9–12.9)
POTASSIUM SERPL-SCNC: 5.1 MMOL/L (ref 3.6–5.5)
PROT SERPL-MCNC: 6.9 G/DL (ref 6–8.2)
PROTHROMBIN TIME: 32.1 SEC (ref 12–14.6)
RBC # BLD AUTO: 2.97 M/UL (ref 4.7–6.1)
SODIUM SERPL-SCNC: 138 MMOL/L (ref 135–145)
WBC # BLD AUTO: 3.3 K/UL (ref 4.8–10.8)

## 2019-06-28 PROCEDURE — 303747 HCHG EXTRA SUTURE

## 2019-06-28 PROCEDURE — 80053 COMPREHEN METABOLIC PANEL: CPT

## 2019-06-28 PROCEDURE — 85610 PROTHROMBIN TIME: CPT

## 2019-06-28 PROCEDURE — 99284 EMERGENCY DEPT VISIT MOD MDM: CPT

## 2019-06-28 PROCEDURE — 85025 COMPLETE CBC W/AUTO DIFF WBC: CPT

## 2019-06-28 PROCEDURE — 85730 THROMBOPLASTIN TIME PARTIAL: CPT

## 2019-06-28 NOTE — ED TRIAGE NOTES
Pt had dialysis on wed. States when they removed the needles the bleeding on his L fistula hasnt stopped. Pt has dialysis today and didn't want to go there with a bloody wrist. Pt has bandage on wrist at this time. Denies lightheadedness or dizziness

## 2019-06-28 NOTE — ED NOTES
"Pt verbalizing that he \"need to leave in order\" to make it to his dialysis appointment. ERP at bedside to discuss risks including death if patient decides to leave against medical advice. Pt verbalized understanding, AMA form signed by patient. Discharge instructions provided.  Pt verbalized the understanding of discharge instructions to follow up with PCP and to return to ER if condition worsens.  Pt ambulated out of ER without difficulty.   "

## 2019-06-28 NOTE — ED PROVIDER NOTES
"ED Provider Note    CHIEF COMPLAINT  Chief Complaint   Patient presents with   • Other       HPI  Parmjit Castelan is a 78 y.o. male who presents to the emergency department complaining of bleeding from his left fistula site.  Patient has AV fistula accessed on Wednesday dialysis he has been using since that time.  Is concerned that he will not stop.  Is been applying pressure and trying many different things but is been unsuccessful.  Patient denies any fevers or chills.  Denies any weakness numbness tingling or other concerns.  States his extremities been taking this chronically.  Denies any other acute concerns or complaints.  Is not felt weak dizzy or shortness of breath    REVIEW OF SYSTEMS  See HPI for further details. All other systems are negative.    PAST MEDICAL HISTORY  Past Medical History:   Diagnosis Date   • Anesthesia     \"needs more sedation\" (can sometimes hear MD during procedure)    • Anticoagulation monitoring, special range    • Aortic stenosis     mod AS- concern for low flow severe AS with rEFof 30%   • Arterial leg ulcer ([facility]) 11/8/2018   • Atrial flutter ([facility]) 6/12/2019   • Basal cell carcinoma of left cheek 3/26/2015   • BPH 7/14/2009   • Bronchitis 12/25/2018   • CAD (coronary artery disease) 2/20/2014   • CATARACT     sanjuanita surgery complete   • CHF (congestive heart failure), NYHA class II, chronic, systolic ([facility]) E F30 in setting of atrial flutter 6/13/2019   • Chronic respiratory failure with hypoxia ([facility]) 5/8/2016   • CKD (chronic kidney disease) stage 4, GFR 15-29 ml/min ([facility]) 1/15/2010    end stage renal disease   • COPD (chronic obstructive pulmonary disease) ([facility])     wears 2.5 L o2 sometimes when he sleeps   • Detached retina    • Dialysis     m,w,f juliann   • EMPHYSEMA    • Gout    • Heart burn     occas   • Heart murmur     maria esther lee cardiologist   • High cholesterol    • Hypertension    • Indigestion     occas   • Kidney cyst    • Leg pain, bilateral 8/10/2015   • On " supplemental oxygen therapy    • Peripheral vascular disease ([facility]) 8/10/2015   • Pneumonia    • Primary insomnia 3/22/2018   • Proteinuria    • PVD (peripheral vascular disease) ([facility])    • Sleep apnea     O2 PER CANULA  AT NIGHT 2l/m       FAMILY HISTORY  Family History   Problem Relation Age of Onset   • Stroke Mother    • Hypertension Mother    • Lung Disease Father         Emphysema, resp failure   • Genitourinary () Maternal Aunt         hematuria   • Hypertension Brother        SOCIAL HISTORY  Social History     Social History   • Marital status:      Spouse name: N/A   • Number of children: N/A   • Years of education: N/A     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     Other Topics Concern   • Not on file     Social History Narrative   • No narrative on file       SURGICAL HISTORY  Past Surgical History:   Procedure Laterality Date   • JUSTIN  6/13/2019    Procedure: ECHOCARDIOGRAM, TRANSESOPHAGEAL;  Surgeon: Harvinder Hoang M.D.;  Location: [facility];  Service: Cardiac   • CARDIOVERSION  6/13/2019    Procedure: CARDIOVERSION;  Surgeon: Harvinder Hoang M.D.;  Location: [facility];  Service: Cardiac   • AV FISTULA CREATION Right 2/21/2019    Procedure: AV FISTULA CREATION - ARM;  Surgeon: David Cason M.D.;  Location: [facility];  Service: General   • FEMORAL ENDARTERECTOMY Left 1/25/2019    Procedure: FEMORAL ENDARTERECTOMY;  Surgeon: David Cason M.D.;  Location: [facility];  Service: General   • FEMORAL POPLITEAL BYPASS Left 1/25/2019    Procedure: LEFT FEMORAL POPLITEAL POLYTETRAFLUOROETHYLENE ePTFE(PROPATEN VASCULAR  GRAFT) BYPASS;  Surgeon: David Cason M.D.;  Location: SURGERY [facility];  Service: General   • ANGIOGRAM Left 1/25/2019    Procedure: LEFT LEG ANGIOGRAM;  Surgeon: David Cason M.D.;  Location: SURGERY [facility];  Service: General   • ENDOSCOPY PROCEDURE  3/21/2018    Procedure: ENDOSCOPY PROCEDURE/UPPER;  Surgeon: Enrique Child D.O.;  Location: SURGERY [facility];  Service: Gastroenterology   • COLONOSCOPY - ENDO  8/15/2016    Procedure: COLONOSCOPY - ENDO;  Surgeon: Mane Whatley M.D.;  Location: ENDOSCOPY [facility];  Service:    • GASTROSCOPY WITH BIOPSY  8/13/2016    Procedure: GASTROSCOPY WITH BIOPSY;  Surgeon: Jorge Leavitt M.D.;  Location: ENDOSCOPY [facility];  Service:    • RECOVERY  12/23/2015    Procedure: VASCULAR CASE-[place]-LEFT ARM FISTULOGRAM WITH ANGIOPLASTY;  Surgeon: Recoveryonly Surgery;  [place] [facility]  Service:    • RECOVERY  3/24/2015    Performed by [facility] at [place] [facility]   • RECOVERY  7/29/2014    Performed by Ir-Recovery Surgery at SURGERY SAME DAY [facility]   • RECOVERY  3/24/2014    Performed by [facility]Recovery Surgery at SURGERY [facility]   • RECOVERY  12/17/2013    Performed by Ir[facility]Recovery Surgery at SURGERY [facility]   • RECOVERY  7/2/2013    Performed by Ir[facility]Recovery Surgery at SURGERY SAME DAY [facility]   • AV FISTULA THROMBOLYSIS  7/2/2013    Performed by David Cason M.D. at SURGERY [facility]   • RECOVERY  1/29/2013    Performed by Ir[facility]Recovery Surgery at SURGERY SAME DAY [facility]   • RECOVERY  7/23/2012    Performed by SURGERY, IR-RECOVERY at SURGERY SAME DAY [facility]   • VITRECTOMY POSTERIOR  10/11/2011    Performed by NAHUM GE at SURGERY SAME DAY [facility]   • RECOVERY  8/12/2011    Performed by SURGERY, IR-RECOVERY at SURGERY SAME DAY [facility]   • VITRECTOMY POSTERIOR  1/18/2011    Performed by NAHUM GE at SURGERY  "SAME DAY [facility]S   • SCLERAL BUCKLING  1/18/2011    Performed by NAHUM GE at SURGERY SAME DAY [facility]   • AV FISTULOGRAM  9/17/2010    Performed by ALESHIA MOSCOSO at SURGERY [facility]   • ANGIOPLASTY BALLOON  9/17/2010    Performed by ALESHIA MOSCOSO at SURGERY [facility]   • AV FISTULOGRAM  7/23/2010    Performed by ALESHIA MOSCOSO at SURGERY [facility]   • ANGIOPLASTY BALLOON  7/23/2010    Performed by ALESHIA MOSCOSO at SURGERY [facility]S   • AV FISTULA REVISION  2/19/2010    Performed by WILLIE HATCH at SURGERY [facility]   • AV FISTULA CREATION  2/12/2010    Performed by ALESHIA MOSCOSO at SURGERY [facility]   • CATARACT PHACO WITH IOL  4/8/08    Performed by STACEY PHILLIPS at SURGERY SAME DAY [facility]   • OTHER ORTHOPEDIC SURGERY  1998    right toe for facititis       CURRENT MEDICATIONS  Home Medications    **Home medications have not yet been reviewed for this encounter**         ALLERGIES  No Known Allergies    PHYSICAL EXAM  VITAL SIGNS: /61   Pulse 83   Temp 37.3 °C (99.1 °F) (Temporal)   Resp 17   Ht 1.727 m (5' 8\")   Wt 76.7 kg (169 lb 1.5 oz)   SpO2 91%   BMI 25.71 kg/m²    Constitutional: Awake alert nontoxic no acute distress  HENT: Normocephalic, Atraumatic, Bilateral external ears normal, Oropharynx moist, No oral exudates, Nose normal.   Eyes: PERRL, EOMI, Conjunctiva normal, No discharge.   Neck: Normal range of motion,  Cardiovascular: Normal heart rate, Normal rhythm, No murmurs, No rubs, No gallops.   Thorax & Lungs: Normal breath sounds, No respiratory distress, No wheezing,.   Musculoskeletal: Good range of motion in all major joints.  Left upper extremity AV fistula placement oozing from access point.  Neurologic: Alert,  No focal deficits noted.   Psychiatric: Affect normal      Results for orders placed or performed during the hospital encounter of 06/28/19   CBC WITH DIFFERENTIAL   Result Value " Ref Range    WBC 3.3 (L) 4.8 - 10.8 K/uL    RBC 2.97 (L) 4.70 - 6.10 M/uL    Hemoglobin 9.5 (L) 14.0 - 18.0 g/dL    Hematocrit 30.3 (L) 42.0 - 52.0 %    .0 (H) 81.4 - 97.8 fL    MCH 32.0 27.0 - 33.0 pg    MCHC 31.4 (L) 33.7 - 35.3 g/dL    RDW 56.8 (H) 35.9 - 50.0 fL    Platelet Count 161 (L) 164 - 446 K/uL    MPV 9.5 9.0 - 12.9 fL    Neutrophils-Polys 75.40 (H) 44.00 - 72.00 %    Lymphocytes 10.00 (L) 22.00 - 41.00 %    Monocytes 11.90 0.00 - 13.40 %    Eosinophils 2.10 0.00 - 6.90 %    Basophils 0.60 0.00 - 1.80 %    Immature Granulocytes 0.00 0.00 - 0.90 %    Nucleated RBC 0.00 /100 WBC    Neutrophils (Absolute) 2.48 1.82 - 7.42 K/uL    Lymphs (Absolute) 0.33 (L) 1.00 - 4.80 K/uL    Monos (Absolute) 0.39 0.00 - 0.85 K/uL    Eos (Absolute) 0.07 0.00 - 0.51 K/uL    Baso (Absolute) 0.02 0.00 - 0.12 K/uL    Immature Granulocytes (abs) 0.00 0.00 - 0.11 K/uL    NRBC (Absolute) 0.00 K/uL   APTT   Result Value Ref Range    APTT 51.7 (H) 24.7 - 36.0 sec   PROTHROMBIN TIME (INR)   Result Value Ref Range    PT 32.1 (H) 12.0 - 14.6 sec    INR 3.01 (H) 0.87 - 1.13        Direct pressure was applied for several minutes but despite that will not stop bleeding.  The patient states he has had areas so in over the past is requesting for the suture there.        Procedure note suture to control bleeding per    There is prepped with Betadine draped in sterile fashion using sterile technique a figure-of-eight 6-0 nylon sutures placed over the site of bleeding.  Bleeding is controlled.    COURSE & MEDICAL DECISION MAKING  Pertinent Labs & Imaging studies reviewed. (See chart for details)    The patient is no longer bleeding.  Is requesting we did check some basic labs.  His hemoglobin is gone a little bit.  INR slightly high.  He is advised to follow-up with Coumadin clinic for this is no active bleeding now.      Metabolic panel is not yet back he is going to leave to go dialysis forms out AMA because he is a dialysis patient  and he could have a life-threatening electrolyte abnormality.  without this lab we cannot exclude this.    The patient will be asked to return for more more bleeding or other concerns.  Follow-up with his doctor for his elevated INR    Sutures out in 7 days.  Here his doctor return for any new medical problems or complaints per      The patient was noted to have elevated blood pressure while in the ER and was counseled to see their doctor within one wee to have this rechecked.    Patient signed out AGAINST MEDICAL ADVICE.  He he understands risk up to including death.  He is still given discharge instructions and told to follow-up      FINAL IMPRESSION  1. Bleeding from dialysis shunt, initial encounter ([facility])    2. Elevated INR    3. ESRD (end stage renal disease) ([facility])                 Electronically signed by: Mane Paul, 6/28/2019 12:27 PM

## 2019-06-28 NOTE — DISCHARGE INSTRUCTIONS
Return for bleeding or other concerns.  Your INR was a little bit high at 3.  See the Coumadin clinic or your doctor.  Return for pain bleeding or other concerns.  You are leaving AGAINST MEDICAL ADVICE we do not know if your electrolytes are dangerous or not.  Go directly to dialysis.    Suture removal in 7 days here or your doctor

## 2019-07-02 ENCOUNTER — ANTICOAGULATION MONITORING (OUTPATIENT)
Dept: VASCULAR LAB | Facility: MEDICAL CENTER | Age: 79
End: 2019-07-02

## 2019-07-02 LAB — INR PPP: 3.4 (ref 2–3.5)

## 2019-07-02 NOTE — PROGRESS NOTES
Anticoagulation Summary  As of 7/2/2019    INR goal:   2.0-3.0   TTR:   29.9 % (5 d)   INR used for dosing:   3.40! (7/2/2019)   Warfarin maintenance plan:   5 mg (5 mg x 1) every day   Weekly warfarin total:   35 mg   Plan last modified:   Gaudencio Page, PharmD (7/2/2019)   Next INR check:   7/9/2019   Target end date:   Indefinite    Indications    Atrial flutter (HCC) (Resolved) [I48.92]             Anticoagulation Episode Summary     INR check location:   [facility]in Clinic    Preferred lab:       Send INR reminders to:       Comments:         Anticoagulation Care Providers     Provider Role Specialty Phone number    [facility]   [phone]    Martha Light M.D.  Family Medicine [phone]        Anticoagulation Patient Findings      INR  supra-therapeutic.   Left a voice message for the patient, asked patient to please call the anticoagulation clinic if they have any signs/symptoms of bleeding and/or thrombosis or any changes to diet or medications.    Follow up appointment in 1 week(s)    2.5mg today then decrease weekly warfarin dose as noted      Gaudencio Page, PharmD

## 2019-07-05 LAB
INR PPP: 3.4 (ref 0.8–1.2)
PROTHROMBIN TIME: 35.9 SEC (ref 9.1–12)

## 2019-07-09 ENCOUNTER — TELEPHONE (OUTPATIENT)
Dept: VASCULAR LAB | Facility: MEDICAL CENTER | Age: 79
End: 2019-07-09

## 2019-07-09 ENCOUNTER — TELEPHONE (OUTPATIENT)
Dept: CARDIOLOGY | Facility: MEDICAL CENTER | Age: 79
End: 2019-07-09

## 2019-07-09 NOTE — TELEPHONE ENCOUNTER
Yes continue with toprol 100 mg QPM, add an additional 50 mg to AM. If doesn't go <100 at rest within 2 days to call our office for an apt. [place]

## 2019-07-09 NOTE — TELEPHONE ENCOUNTER
Cande Sargent R.N.             [facility]       Patient has rapid heart beat and it's not coming down. He wants to know if he should take an extra Metoprolol. He can be reached at [phone].      Spoke with patient regarding above, he states all morning his heart has been 120s to 130s. Denies any symptoms including no dizziness, palpitations, or lightheadness. He is wondering if he can take an extra 50 of metoprolol to see if it helps reduce his HR, his BP today was 139/44.    To SC---please advise.

## 2019-07-09 NOTE — TELEPHONE ENCOUNTER
"[facility]    Called patient to inform him that we received \"TNP\" (test not performed) results on most recent blood sample from [facility] received from [facility] INR drawn on Monday.   Unsure as to why [facility] was unable to test, but sent orders to [facility] to have them re-draw for INR tomorrow (Wednesday, 7/10/19) at [facility] in [facility] fax [phone].    Gita Delcid  PharmD    "

## 2019-07-09 NOTE — TELEPHONE ENCOUNTER
Spoke with ED regarding starting amio or waiting. Per ED would like to see patient first, she does have opening tomorrow. Call out to pt, no answer. LVM with details and to call back.

## 2019-07-10 ENCOUNTER — OFFICE VISIT (OUTPATIENT)
Dept: CARDIOLOGY | Facility: MEDICAL CENTER | Age: 79
End: 2019-07-10
Payer: MEDICARE

## 2019-07-10 VITALS
DIASTOLIC BLOOD PRESSURE: 70 MMHG | HEART RATE: 95 BPM | BODY MASS INDEX: 25.8 KG/M2 | HEIGHT: 69 IN | SYSTOLIC BLOOD PRESSURE: 112 MMHG | WEIGHT: 174.2 LBS

## 2019-07-10 DIAGNOSIS — G47.33 OSA (OBSTRUCTIVE SLEEP APNEA): ICD-10-CM

## 2019-07-10 DIAGNOSIS — Z99.2 ESRD (END STAGE RENAL DISEASE) ON DIALYSIS (HCC): ICD-10-CM

## 2019-07-10 DIAGNOSIS — I10 ESSENTIAL HYPERTENSION: ICD-10-CM

## 2019-07-10 DIAGNOSIS — I48.92 ATRIAL FLUTTER, UNSPECIFIED TYPE (HCC): ICD-10-CM

## 2019-07-10 DIAGNOSIS — N18.6 ESRD (END STAGE RENAL DISEASE) ON DIALYSIS (HCC): ICD-10-CM

## 2019-07-10 DIAGNOSIS — Z79.01 CHRONIC ANTICOAGULATION: Chronic | ICD-10-CM

## 2019-07-10 LAB — REQUEST PROBLEM   100552: NORMAL

## 2019-07-10 PROCEDURE — 99214 OFFICE O/P EST MOD 30 MIN: CPT | Performed by: NURSE PRACTITIONER

## 2019-07-10 PROCEDURE — 93000 ELECTROCARDIOGRAM COMPLETE: CPT | Performed by: INTERNAL MEDICINE

## 2019-07-10 RX ORDER — AMIODARONE HYDROCHLORIDE 200 MG/1
400 TABLET ORAL DAILY
Qty: 60 TAB | Refills: 2 | Status: SHIPPED | OUTPATIENT
Start: 2019-07-10 | End: 2019-07-10 | Stop reason: SDUPTHER

## 2019-07-10 RX ORDER — AMIODARONE HYDROCHLORIDE 400 MG/1
400 TABLET ORAL 2 TIMES DAILY
Qty: 60 TAB | Refills: 1 | Status: SHIPPED | OUTPATIENT
Start: 2019-07-10 | End: 2019-07-17 | Stop reason: SDUPTHER

## 2019-07-10 RX ORDER — METOPROLOL SUCCINATE 100 MG/1
150 TABLET, EXTENDED RELEASE ORAL DAILY
Qty: 3 TAB | Refills: 3 | Status: SHIPPED | OUTPATIENT
Start: 2019-07-10 | End: 2019-07-10 | Stop reason: SDUPTHER

## 2019-07-10 RX ORDER — METOPROLOL SUCCINATE 100 MG/1
150 TABLET, EXTENDED RELEASE ORAL DAILY
Qty: 30 TAB | Refills: 3 | Status: SHIPPED | OUTPATIENT
Start: 2019-07-10 | End: 2019-07-10 | Stop reason: SDUPTHER

## 2019-07-10 RX ORDER — METOPROLOL SUCCINATE 100 MG/1
150 TABLET, EXTENDED RELEASE ORAL DAILY
Qty: 45 TAB | Refills: 3 | Status: ON HOLD | OUTPATIENT
Start: 2019-07-10 | End: 2019-08-11

## 2019-07-10 ASSESSMENT — ENCOUNTER SYMPTOMS
PALPITATIONS: 1
WHEEZING: 0
PND: 0
COUGH: 0
CHILLS: 0
SHORTNESS OF BREATH: 1
LOSS OF CONSCIOUSNESS: 0
BLOOD IN STOOL: 0
DIAPHORESIS: 0
ABDOMINAL PAIN: 0
FEVER: 0
CLAUDICATION: 0
ORTHOPNEA: 0
DIZZINESS: 0

## 2019-07-10 NOTE — LETTER
"     [facility]   [address], [address]  [place] [address]  Phone: [phone]  Fax: [phone]              Parmjit Castelan  1940    Encounter Date: 7/10/2019    ILAN Jiang          PROGRESS NOTE:  Cardiology/Electrophysiology Follow-up Note    Subjective:   Chief Complaint:   Chief Complaint   Patient presents with   • Atrial Flutter     Parmjit Castelan is a 78 y.o. male who presents today for atrial flutter.      He is followed by  *** and was last seen by ELIDA Pandya on 06/25***/19 s/p hospitalization for atrial flutter.  Underwent successful cardioversion on 06/***/19. He was on doing well at that time and continued on his Toprol  mg daily.  He called this week stating his heart rates were 120-130s bpm.     Medical history significant for newly diagnosed atrial flutter on chronic anticoagulation with Coumadin, ESRD with right AV fistula and dialysis MWF, CAD (no interventions), CHF Hypertension, hyperlipidemia, PVD with chronic claudication***, BPH, HELGA on CPAP***?, COPD on 2.5L nasal cannula at night***, gout, moderate AS.    Today in follow up, he states his heart rates have been in the 120-130s bpm for the past two days associated with palpitations and dyspnea with exertion.  He denies chest pain, dizziness, pre syncope or syncope.  He reports fluid retension for the past 1-2 weeks with lower extremity edema, even post dialysis.  Denies and signs or symptoms of bleeding or bleeding issues. Patient endorses medication compliance.     Past Medical History:   Diagnosis Date   • Anesthesia     \"needs more sedation\" (can sometimes hear MD during procedure)    • Anticoagulation monitoring, special range    • Aortic stenosis     mod AS- concern for low flow severe AS with rEFof 30%   • Arterial leg ulcer (HCC) 11/8/2018   • Atrial flutter (HCC) 6/12/2019   • Basal cell carcinoma of left cheek 3/26/2015   • BPH 7/14/2009   • " Bronchitis 12/25/2018   • CAD (coronary artery disease) 2/20/2014   • CATARACT     sanjuanita surgery complete   • CHF (congestive heart failure), NYHA class II, chronic, systolic ([facility]) E F30 in setting of atrial flutter 6/13/2019   • Chronic respiratory failure with hypoxia ([facility]) 5/8/2016   • CKD (chronic kidney disease) stage 4, GFR 15-29 ml/min ([facility]) 1/15/2010    end stage renal disease   • COPD (chronic obstructive pulmonary disease) ([facility])     wears 2.5 L o2 sometimes when he sleeps   • Detached retina    • Dialysis     m,w,f davita   • EMPHYSEMA    • Gout    • Heart burn     occas   • Heart murmur     maria esther lee cardiologist   • High cholesterol    • Hypertension    • Indigestion     occas   • Kidney cyst    • Leg pain, bilateral 8/10/2015   • On supplemental oxygen therapy    • Peripheral vascular disease ([facility]) 8/10/2015   • Pneumonia    • Primary insomnia 3/22/2018   • Proteinuria    • PVD (peripheral vascular disease) ([facility])    • Sleep apnea     O2 PER CANULA  AT NIGHT 2l/m     Past Surgical History:   Procedure Laterality Date   • JUSTIN  6/13/2019    Procedure: ECHOCARDIOGRAM, TRANSESOPHAGEAL;  Surgeon: Harvinder Hoang M.D.;  Location: [facility];  Service: Cardiac   • CARDIOVERSION  6/13/2019    Procedure: CARDIOVERSION;  Surgeon: Harvnider Hoang M.D.;  Location: [facility];  Service: Cardiac   • AV FISTULA CREATION Right 2/21/2019    Procedure: AV FISTULA CREATION - ARM;  Surgeon: David Cason M.D.;  Location: SURGERY [facility];  Service: General   • FEMORAL ENDARTERECTOMY Left 1/25/2019    Procedure: FEMORAL ENDARTERECTOMY;  Surgeon: David Cason M.D.;  Location: SURGERY [facility];  Service: General   • FEMORAL POPLITEAL BYPASS Left 1/25/2019    Procedure: LEFT FEMORAL POPLITEAL POLYTETRAFLUOROETHYLENE ePTFE(PROPATEN VASCULAR GRAFT) BYPASS;  Surgeon: David Cason M.D.;  Location: SURGERY [facility];  Service: General   • ANGIOGRAM Left  1/25/2019    Procedure: LEFT LEG ANGIOGRAM;  Surgeon: David Cason M.D.;  Location: SURGERY [facility];  Service: General   • ENDOSCOPY PROCEDURE  3/21/2018    Procedure: ENDOSCOPY PROCEDURE/UPPER;  Surgeon: Enrique Child D.O.;  Location: SURGERY [facility];  Service: Gastroenterology   • COLONOSCOPY - ENDO  8/15/2016    Procedure: COLONOSCOPY - ENDO;  Surgeon: Mane Whatley M.D.;  Location: ENDOSCOPY [facility];  Service:    • GASTROSCOPY WITH BIOPSY  8/13/2016    Procedure: GASTROSCOPY WITH BIOPSY;  Surgeon: Jorge Leavitt M.D.;  Location: ENDOSCOPY [facility];  Service:    • RECOVERY  12/23/2015    Procedure: VASCULAR CASE-CASON-LEFT ARM FISTULOGRAM WITH ANGIOPLASTY;  Surgeon: Recoveryonly Surgery;  Location: SURGERY PRE-POST PROC UNIT [facility];  Service:    • RECOVERY  3/24/2015    Performed by Recoveryonly Surgery at SURGERY PRE-POST PROC UNIT [facility]   • RECOVERY  7/29/2014    Performed by Ir[facility]Recovery Surgery at SURGERY SAME DAY [facility]   • RECOVERY  3/24/2014    Performed by Ir[facility]Recovery Surgery at SURGERY [facility]   • RECOVERY  12/17/2013    Performed by Ir[facility]Recovery Surgery at SURGERY SAME DAY [facility]   • RECOVERY  7/2/2013    Performed by Ir-Recovery Surgery at SURGERY SAME DAY [facility]   • AV FISTULA THROMBOLYSIS  7/2/2013    Performed by David Cason M.D. at SURGERY [facility]   • RECOVERY  1/29/2013    Performed by Ir-Recovery Surgery at SURGERY SAME DAY [facility]   • RECOVERY  7/23/2012    Performed by SURGERY, IR-RECOVERY at SURGERY SAME DAY [facility]S   • VITRECTOMY POSTERIOR  10/11/2011    Performed by NAHUM GE at SURGERY SAME DAY [facility]S   • RECOVERY  8/12/2011    Performed by SURGERY, IR-RECOVERY at SURGERY SAME DAY [facility]   • VITRECTOMY POSTERIOR  1/18/2011    Performed by NAHUM GE at SURGERY SAME DAY [facility]S   • SCLERAL BUCKLING  1/18/2011    Performed by NAHUM GE at SURGERY SAME DAY [facility]S     • AV FISTULOGRAM  9/17/2010    Performed by ALESHIA MOSCOSO at SURGERY [facility] ORS   • ANGIOPLASTY BALLOON  9/17/2010    Performed by ALESHIA MOSCOSO at SURGERY [facility]   • AV FISTULOGRAM  7/23/2010    Performed by ALESHIA MOSCOSO at SURGERY [facility]S   • ANGIOPLASTY BALLOON  7/23/2010    Performed by ALESHIA MOSCOSO at SURGERY [facility]S   • AV FISTULA REVISION  2/19/2010    Performed by WILLIE HATCH at SURGERY [facility] ORS   • AV FISTULA CREATION  2/12/2010    Performed by ALESHIA MOSCOSO at SURGERY [facility]S   • CATARACT PHACO WITH IOL  4/8/08    Performed by STACEY PHILLIPS at SURGERY SAME DAY [facility]S   • OTHER ORTHOPEDIC SURGERY  1998    right toe for facititis     Family History   Problem Relation Age of Onset   • Stroke Mother    • Hypertension Mother    • Lung Disease Father         Emphysema, resp failure   • Genitourinary () Maternal Aunt         hematuria   • Hypertension Brother      Social History     Social History   • Marital status:      Spouse name: N/A   • Number of children: N/A   • Years of education: N/A     Occupational History   • Not on file.     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 3 Tab 3   • amiodarone (CORDARONE) 200 MG Tab Take 2 Tabs by mouth every day. 60 Tab 2   • zolpidem (AMBIEN) 10 MG Tab Take 1 Tab by mouth every bedtime for 90 days.  "90 Each 0   • torsemide (DEMADEX) 10 MG tablet Take 1 Tab by mouth every day. 30 Tab 3   • lisinopril (PRINIVIL) 5 MG Tab Take 1 Tab by mouth 2 Times a Day. 60 Tab 1   • warfarin (COUMADIN) 5 MG Tab Take 1 Tab by mouth every day. 30 Tab 3   • tamsulosin (FLOMAX) 0.4 MG capsule Take 0.8 mg by mouth every morning.     • omeprazole (PRILOSEC) 40 MG delayed-release capsule Take 40 mg by mouth every morning.     • traZODone (DESYREL) 150 MG Tab Take 300 mg by mouth every evening.     • calcium acetate (PHOS-LO) 667 MG Cap Take 2,001 mg by mouth 3 times a day, with meals. 2001mg three times a day with meals and 1334mg with snacks     • doxazosin (CARDURA) 4 MG Tab Take 4 mg by mouth every evening.     • pravastatin (PRAVACHOL) 20 MG Tab Take 20 mg by mouth every evening.     • Fluticasone-Umeclidin-Vilant (TRELEGY ELLIPTA) 100-62.5-25 MCG/INH AEROSOL POWDER, BREATH ACTIVATED Inhale 1 Puff by mouth every day. Rinse mouth after use 1 Each 11   • levalbuterol (XOPENEX) 1.25 MG/3ML Nebu Soln 3 mL by Nebulization route every four hours as needed for Shortness of Breath. 120 Bullet 11     No current facility-administered medications for this visit.        Review of Systems   Constitutional: Negative for chills, diaphoresis and fever.   Respiratory: Positive for shortness of breath (with exertion). Negative for cough and wheezing.    Cardiovascular: Positive for palpitations and leg swelling. Negative for chest pain, orthopnea, claudication and PND.   Gastrointestinal: Negative for abdominal pain and blood in stool.   Genitourinary: Negative for hematuria.   Neurological: Negative for dizziness and loss of consciousness.     All others systems reviewed and negative.   Objective:     /70 (BP Location: Left arm, Patient Position: Sitting, BP Cuff Size: Large adult)   Pulse 95   Ht 1.74 m (5' 8.5\")   Wt 79 kg (174 lb 3.2 oz)  Body mass index is 26.1 kg/m².    Physical Exam   Constitutional: He is oriented to person, place, " and time. No distress.   HENT:   Head: Normocephalic.   Eyes: Pupils are equal, round, and reactive to light.   Neck: No JVD present.   Cardiovascular: Normal rate.  An irregularly irregular rhythm present.   No murmur heard.  Pulses:       Radial pulses are 2+ on the right side, and 1+ on the left side.        Dorsalis pedis pulses are 1+ on the right side, and 1+ on the left side.   Pulmonary/Chest: Effort normal and breath sounds normal. No respiratory distress. He has no wheezes. He has no rales. He exhibits no tenderness.   Abdominal: Soft. Bowel sounds are normal.   Musculoskeletal: He exhibits edema (generalized 1-2+).   Neurological: He is alert and oriented to person, place, and time.   Skin: Skin is warm and dry. He is not diaphoretic. No erythema.   Psychiatric: Mood, memory, affect and judgment normal.   Nursing note and vitals reviewed.    Cardiac Imaging and Procedures Review:    EKG 07/10/2019: Atrial Flutter, 3:1 block, RBBB    Echocardiogram (06/13/2019):   No thrombus detected in the left atrial appendage or other atrial   structures.  Moderately reduced left ventricular systolic function.  Left ventricular ejection fraction is visually estimated to be 30%.  Global hypokinesis.  Diastolic function is difficult to assess with atrial fibrillation.  Reduced right ventricular systolic function.  Mild mitral regurgitation.  Moderate aortic stenosis.  Mild aortic insufficiency.  Moderate pericardial effusion WITHOUT evidence of hemodynamic   compromise.  Compared to the images of the transthoracic echocardiogram dated   8/7/2018, the ejection fraction is further reduced previously low   normal.  It is difficult to compare the size of the pericardial   effusion due to technique but it was previously large.    Labs (personally reviewed and notable for):   Lab Results   Component Value Date/Time    SODIUM 138 06/28/2019 12:45 PM    POTASSIUM 5.1 06/28/2019 12:45 PM    CHLORIDE 96 06/28/2019 12:45 PM    CO2  23 06/28/2019 12:45 PM    GLUCOSE 111 (H) 06/28/2019 12:45 PM    BUN 86 (HH) 06/28/2019 12:45 PM    CREATININE 10.82 (HH) 06/28/2019 12:45 PM    CREATININE 2.1 (H) 05/06/2009 10:08 AM      Lab Results   Component Value Date/Time    WBC 3.3 (L) 06/28/2019 12:45 PM    RBC 2.97 (L) 06/28/2019 12:45 PM    HEMOGLOBIN 9.5 (L) 06/28/2019 12:45 PM    HEMATOCRIT 30.3 (L) 06/28/2019 12:45 PM    .0 (H) 06/28/2019 12:45 PM    MCH 32.0 06/28/2019 12:45 PM    MCHC 31.4 (L) 06/28/2019 12:45 PM    MPV 9.5 06/28/2019 12:45 PM    NEUTSPOLYS 75.40 (H) 06/28/2019 12:45 PM    LYMPHOCYTES 10.00 (L) 06/28/2019 12:45 PM    MONOCYTES 11.90 06/28/2019 12:45 PM    EOSINOPHILS 2.10 06/28/2019 12:45 PM    BASOPHILS 0.60 06/28/2019 12:45 PM      PT/INR:   Lab Results   Component Value Date/Time    PROTHROMBTM 35.9 (H) 07/01/2019 12:00 AM    PROTHROMBTM 32.1 (H) 06/28/2019 12:45 PM    INR 3.40 07/02/2019   ]  Assessment:     1. Atrial flutter, unspecified type (HCC)  EKG    REFERRAL TO ANTICOAGULATION MONITORING    CL-CARDIOVERSION   2. ESRD (end stage renal disease) on dialysis (HCC)     3. HELGA (obstructive sleep apnea)     4. Chronic anticoagulation  REFERRAL TO ANTICOAGULATION MONITORING   5. Essential hypertension       Medical Decision Making:  Today's Assessment / Status / Plan:   1. Atrial Flutter  - Mildly symptomatic, palpitations, ARIAS.  - Hx of cardioversion (last DCCV 06/13/19),   - Per echo (06/13/19) Moderately reduced LV function, Global hypokinesis, EF 30%, mild MR, moderate AS, mild AI, moderate pericardial effusion without hemodynamic compromise.   - EKG today showed typical atrial flutter with 3:1, no acute findings.   - On OAC with Coumadin, continue.  - On metoprolol 150 mg daily. Patient encouraged to monitor BP and call office if Hrs remain elevated.   - Discussed options including rate control, addition of an antiarrhythmic, and ablation. Patient wishes to proceed with ablation in future.  - Discussed with Dr. Grissom,  per his recommendations, will continue BB, add amiodarone 200 mg BID until cardioversion, and schedule patient for cardioversion for next week. Will also schedule for atrial flutter ablation.     2. Congestive Heart Failure  - HFrEF, Stage C, Class II-III, LVEF 30%: decreased from 55% (08/07/18).  - Appears fluid overloaded, weight today 174 lbs, up 7 lbs.   - Reinforced s/sx of worsening heart failure with patient and weight monitoring. Pt verbalizes understanding. Pt to call office or RTC if present.   - On torsemide 10 mg, lisinopril, and toprol  - Labs pending    3. Chronic Anticoagulation: Coumadin  - No signs/symptoms of bleeding.   - [id number] score: 5  - INR Goal: 2-3, last INR 3.4 (07/2/19).   - Continue OAC with Coumadin,   - Referral to anticoagulation clinic to monitor closely, continue.     4. ESRD  - On dialysis with Divita MWF.  - Right AV fistula  - Followed by nephrology, continue.     5. CAD  - No angina  - On statin  - Consideration for repeat echocardiogram to reevaluate LV function once in SR, if continues to be depressed, consideration for ischemic workup.     Plan reviewed in detail with the patient and he verbalizes understanding and is in agreement. RTC 4 weeks, sooner if clinical condition changes.     Thank you for allowing me to participate in this patients care.  Please contact me with any questions or concerns.     DESIREE Jiang.   [facility]  [phone]    Collaborating MD/ADD: Dr. Tamra MD.      Cardiology/Electrophysiology Follow-up Note    Subjective:   Chief Complaint:   Chief Complaint   Patient presents with   • Atrial Flutter     Parmjit Castelan is a 78 y.o. male who presents today for atrial flutter.      He was last seen by ELIDA Pandya on 06/25/19 s/p hospitalization for atrial flutter. Underwent cardioversion on 06/13/19, JUSTIN negative for clot. He was on doing well at that time and continued on his Toprol  " mg daily.  He called this week stating his heart rates were 120-130s bpm.     Medical history significant for newly diagnosed atrial flutter on chronic anticoagulation with Coumadin, ESRD with right AV fistula and dialysis MWF, CAD (no interventions), CHF Hypertension, hyperlipidemia, PVD with chronic claudication, BPH, HELGA- not on CPAP, COPD on 2.5L nasal cannula as needed, gout, and moderate AS.    Today in follow up, he states his heart rates have been in the 120-130s bpm for the past two days associated with palpitations and dyspnea with exertion.  He denies chest pain, dizziness, pre syncope or syncope.  He reports fluid retension for the past 1-2 weeks with lower extremity edema, even post dialysis.  Denies and signs or symptoms of bleeding or bleeding issues. Patient endorses medication compliance.     Past Medical History:   Diagnosis Date   • Anesthesia     \"needs more sedation\" (can sometimes hear MD during procedure)    • Anticoagulation monitoring, special range    • Aortic stenosis     mod AS- concern for low flow severe AS with rEFof 30%   • Arterial leg ulcer ([facility]) 11/8/2018   • Atrial flutter ([facility]) 6/12/2019   • Basal cell carcinoma of left cheek 3/26/2015   • BPH 7/14/2009   • Bronchitis 12/25/2018   • CAD (coronary artery disease) 2/20/2014   • CATARACT     sanjuanita surgery complete   • CHF (congestive heart failure), NYHA class II, chronic, systolic ([facility]) E F30 in setting of atrial flutter 6/13/2019   • Chronic respiratory failure with hypoxia ([facility]) 5/8/2016   • CKD (chronic kidney disease) stage 4, GFR 15-29 ml/min ([facility]) 1/15/2010    end stage renal disease   • COPD (chronic obstructive pulmonary disease) ([facility])     wears 2.5 L o2 sometimes when he sleeps   • Detached retina    • Dialysis     m,w,f davita   • EMPHYSEMA    • Gout    • Heart burn     occas   • Heart murmur     maria esther lee cardiologist   • High cholesterol    • Hypertension    • Indigestion     occas   • Kidney cyst    • Leg " pain, bilateral 8/10/2015   • On supplemental oxygen therapy    • Peripheral vascular disease (HCC) 8/10/2015   • Pneumonia    • Primary insomnia 3/22/2018   • Proteinuria    • PVD (peripheral vascular disease) (HCC)    • Sleep apnea     O2 PER CANULA  AT NIGHT 2l/m     Past Surgical History:   Procedure Laterality Date   • JUSTIN  6/13/2019    Procedure: ECHOCARDIOGRAM, TRANSESOPHAGEAL;  Surgeon: Harvinder Hoang M.D.;  Location: [facility];  Service: Cardiac   • CARDIOVERSION  6/13/2019    Procedure: CARDIOVERSION;  Surgeon: Harvinder Hoang M.D.;  Location: [facility];  Service: Cardiac   • AV FISTULA CREATION Right 2/21/2019    Procedure: AV FISTULA CREATION - ARM;  Surgeon: David Cason M.D.;  Location: SURGERY [facility];  Service: General   • FEMORAL ENDARTERECTOMY Left 1/25/2019    Procedure: FEMORAL ENDARTERECTOMY;  Surgeon: David Cason M.D.;  Location: SURGERY [facility];  Service: General   • FEMORAL POPLITEAL BYPASS Left 1/25/2019    Procedure: LEFT FEMORAL POPLITEAL POLYTETRAFLUOROETHYLENE ePTFE(PROPATEN VASCULAR GRAFT) BYPASS;  Surgeon: David Cason M.D.;  Location: SURGERY [facility];  Service: General   • ANGIOGRAM Left 1/25/2019    Procedure: LEFT LEG ANGIOGRAM;  Surgeon: David Cason M.D.;  Location: SURGERY [facility];  Service: General   • ENDOSCOPY PROCEDURE  3/21/2018    Procedure: ENDOSCOPY PROCEDURE/UPPER;  Surgeon: Enrique Child D.O.;  Location: SURGERY [facility];  Service: Gastroenterology   • COLONOSCOPY - ENDO  8/15/2016    Procedure: COLONOSCOPY - ENDO;  Surgeon: Mane Whatley M.D.;  Location: ENDOSCOPY [facility];  Service:    • GASTROSCOPY WITH BIOPSY  8/13/2016    Procedure: GASTROSCOPY WITH BIOPSY;  Surgeon: Jorge Leavitt M.D.;  Location: ENDOSCOPY [facility];  Service:    • RECOVERY  12/23/2015    Procedure: VASCULAR CASE-CASON-LEFT ARM FISTULOGRAM WITH ANGIOPLASTY;  Surgeon:  Recoveryonly Surgery;  Location: SURGERY PRE-POST PROC UNIT [facility];  Service:    • RECOVERY  3/24/2015    Performed by Recoveryonly Surgery at SURGERY PRE-POST PROC UNIT [facility]   • RECOVERY  7/29/2014    Performed by Ir-Recovery Surgery at SURGERY SAME DAY [facility] ORS   • RECOVERY  3/24/2014    Performed by Ir-Recovery Surgery at SURGERY [facility]   • RECOVERY  12/17/2013    Performed by Ir-Recovery Surgery at SURGERY SAME DAY [facility] ORS   • RECOVERY  7/2/2013    Performed by Ir-Recovery Surgery at SURGERY SAME DAY [facility] ORS   • AV FISTULA THROMBOLYSIS  7/2/2013    Performed by David Cason M.D. at SURGERY [facility] ORS   • RECOVERY  1/29/2013    Performed by Ir-Recovery Surgery at SURGERY SAME DAY [facility] ORS   • RECOVERY  7/23/2012    Performed by SURGERY, IR-RECOVERY at SURGERY SAME DAY [facility] ORS   • VITRECTOMY POSTERIOR  10/11/2011    Performed by NAHUM GE at SURGERY SAME DAY [facility] ORS   • RECOVERY  8/12/2011    Performed by SURGERY, IR-RECOVERY at SURGERY SAME DAY [facility] ORS   • VITRECTOMY POSTERIOR  1/18/2011    Performed by NAHUM GE at SURGERY SAME DAY [facility] ORS   • SCLERAL BUCKLING  1/18/2011    Performed by NAHUM GE at SURGERY SAME DAY [facility] ORS   • AV FISTULOGRAM  9/17/2010    Performed by DAVID CASON at SURGERY [facility] ORS   • ANGIOPLASTY BALLOON  9/17/2010    Performed by DAVID CASON at SURGERY [facility] ORS   • AV FISTULOGRAM  7/23/2010    Performed by DAVID CASON at SURGERY [facility] ORS   • ANGIOPLASTY BALLOON  7/23/2010    Performed by DAVID CASON at SURGERY [facility] ORS   • AV FISTULA REVISION  2/19/2010    Performed by WILLIE HATCH at SURGERY [facility] ORS   • AV FISTULA CREATION  2/12/2010    Performed by DAVID CASON at SURGERY [facility] ORS   • CATARACT PHACO WITH IOL  4/8/08    Performed by STACEY PHILLIPS at SURGERY SAME DAY [facility] ORS   • OTHER ORTHOPEDIC SURGERY  1998    right  toe for facititis     Family History   Problem Relation Age of Onset   • Stroke Mother    • Hypertension Mother    • Lung Disease Father         Emphysema, resp failure   • Genitourinary () Maternal Aunt         hematuria   • Hypertension Brother      Social History     Social History   • Marital status:      Spouse name: N/A   • Number of children: N/A   • Years of education: N/A     Occupational History   • Not on file.     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 60 Tab 1   • metoprolol SR (TOPROL XL) 100 MG TABLET SR 24 HR Take 1.5 Tabs by mouth every day. 45 Tab 3   • zolpidem (AMBIEN) 10 MG Tab Take 1 Tab by mouth every bedtime for 90 days. 90 Each 0   • torsemide (DEMADEX) 10 MG tablet Take 1 Tab by mouth every day. 30 Tab 3   • lisinopril (PRINIVIL) 5 MG Tab Take 1 Tab by mouth 2 Times a Day. 60 Tab 1   • warfarin (COUMADIN) 5 MG Tab Take 1 Tab by mouth every day. 30 Tab 3   • tamsulosin (FLOMAX) 0.4 MG capsule Take 0.8 mg by mouth every morning.     • omeprazole (PRILOSEC) 40 MG delayed-release capsule Take 40 mg by mouth every morning.     • traZODone (DESYREL) 150 MG Tab Take 300 mg by mouth every evening.     • calcium acetate (PHOS-LO) 667 MG Cap Take 2,001 mg by mouth 3 times a day, with meals. 2001mg three times a day with meals and 1334mg with snacks     • doxazosin (CARDURA) 4 MG Tab Take 4 mg by mouth every evening.     •  "pravastatin (PRAVACHOL) 20 MG Tab Take 20 mg by mouth every evening.     • Fluticasone-Umeclidin-Vilant (TRELEGY ELLIPTA) 100-62.5-25 MCG/INH AEROSOL POWDER, BREATH ACTIVATED Inhale 1 Puff by mouth every day. Rinse mouth after use 1 Each 11   • levalbuterol (XOPENEX) 1.25 MG/3ML Nebu Soln 3 mL by Nebulization route every four hours as needed for Shortness of Breath. 120 Bullet 11     No current facility-administered medications for this visit.        Review of Systems   Constitutional: Negative for chills, diaphoresis and fever.   Respiratory: Positive for shortness of breath (with exertion). Negative for cough and wheezing.    Cardiovascular: Positive for palpitations and leg swelling. Negative for chest pain, orthopnea, claudication and PND.   Gastrointestinal: Negative for abdominal pain and blood in stool.   Genitourinary: Negative for hematuria.   Neurological: Negative for dizziness and loss of consciousness.     All others systems reviewed and negative.   Objective:     /70 (BP Location: Left arm, Patient Position: Sitting, BP Cuff Size: Large adult)   Pulse 95   Ht 1.74 m (5' 8.5\")   Wt 79 kg (174 lb 3.2 oz)  Body mass index is 26.1 kg/m².    Physical Exam   Constitutional: He is oriented to person, place, and time. No distress.   HENT:   Head: Normocephalic.   Eyes: Pupils are equal, round, and reactive to light.   Neck: No JVD present.   Cardiovascular: Normal rate.  An irregularly irregular rhythm present.   No murmur heard.  Pulses:       Radial pulses are 2+ on the right side, and 1+ on the left side.        Dorsalis pedis pulses are 1+ on the right side, and 1+ on the left side.   Pulmonary/Chest: Effort normal and breath sounds normal. No respiratory distress. He has no wheezes. He has no rales. He exhibits no tenderness.   Abdominal: Soft. Bowel sounds are normal.   Musculoskeletal: He exhibits edema (generalized 1-2+).   Neurological: He is alert and oriented to person, place, and time.   "   Skin: Skin is warm and dry. He is not diaphoretic. No erythema.   Psychiatric: Mood, memory, affect and judgment normal.   Nursing note and vitals reviewed.    Cardiac Imaging and Procedures Review:    EKG 07/10/2019: Atrial Flutter, 3:1 block, RBBB    Echocardiogram (06/13/2019):   No thrombus detected in the left atrial appendage or other atrial   structures.  Moderately reduced left ventricular systolic function.  Left ventricular ejection fraction is visually estimated to be 30%.  Global hypokinesis.  Diastolic function is difficult to assess with atrial fibrillation.  Reduced right ventricular systolic function.  Mild mitral regurgitation.  Moderate aortic stenosis.  Mild aortic insufficiency.  Moderate pericardial effusion WITHOUT evidence of hemodynamic   compromise.  Compared to the images of the transthoracic echocardiogram dated   8/7/2018, the ejection fraction is further reduced previously low   normal.  It is difficult to compare the size of the pericardial   effusion due to technique but it was previously large.    Labs (personally reviewed and notable for):   Lab Results   Component Value Date/Time    SODIUM 138 06/28/2019 12:45 PM    POTASSIUM 5.1 06/28/2019 12:45 PM    CHLORIDE 96 06/28/2019 12:45 PM    CO2 23 06/28/2019 12:45 PM    GLUCOSE 111 (H) 06/28/2019 12:45 PM    BUN 86 (HH) 06/28/2019 12:45 PM    CREATININE 10.82 (HH) 06/28/2019 12:45 PM    CREATININE 2.1 (H) 05/06/2009 10:08 AM      Lab Results   Component Value Date/Time    WBC 3.3 (L) 06/28/2019 12:45 PM    RBC 2.97 (L) 06/28/2019 12:45 PM    HEMOGLOBIN 9.5 (L) 06/28/2019 12:45 PM    HEMATOCRIT 30.3 (L) 06/28/2019 12:45 PM    .0 (H) 06/28/2019 12:45 PM    MCH 32.0 06/28/2019 12:45 PM    MCHC 31.4 (L) 06/28/2019 12:45 PM    MPV 9.5 06/28/2019 12:45 PM    NEUTSPOLYS 75.40 (H) 06/28/2019 12:45 PM    LYMPHOCYTES 10.00 (L) 06/28/2019 12:45 PM    MONOCYTES 11.90 06/28/2019 12:45 PM    EOSINOPHILS 2.10 06/28/2019 12:45 PM    BASOPHILS  0.60 06/28/2019 12:45 PM      PT/INR:   Lab Results   Component Value Date/Time    PROTHROMBTM 35.9 (H) 07/01/2019 12:00 AM    PROTHROMBTM 32.1 (H) 06/28/2019 12:45 PM    INR 3.40 07/02/2019   ]  Assessment:     1. Atrial flutter, unspecified type (HCC)  EKG    REFERRAL TO ANTICOAGULATION MONITORING    CL-CARDIOVERSION   2. ESRD (end stage renal disease) on dialysis (HCC)     3. HELGA (obstructive sleep apnea)     4. Chronic anticoagulation  REFERRAL TO ANTICOAGULATION MONITORING   5. Essential hypertension       Medical Decision Making:  Today's Assessment / Status / Plan:   1. Atrial Flutter  - Mildly symptomatic, palpitations, ARIAS.  - Hx of cardioversion (last DCCV 06/13/19),   - Per echo (06/13/19) Moderately reduced LV function, Global hypokinesis, EF 30%, mild MR, moderate AS, mild AI, moderate pericardial effusion without hemodynamic compromise.   - EKG today showed typical atrial flutter with 3:1, no acute findings.   - On OAC with Coumadin, continue.  - On metoprolol 150 mg daily. Patient encouraged to monitor BP and call office if Hrs remain elevated.   - Discussed options including rate control, addition of an antiarrhythmic, and ablation. Patient wishes to proceed with ablation in future.  - Discussed with Dr. Grissom, per his recommendations, will continue BB, add amiodarone 400 mg BID until cardioversion, and schedule patient for cardioversion for next week. Will also schedule for atrial flutter ablation.  to call to schedule. Call to notify patient of plan.  All question/concerns were answered.   - ER precautions discussed.     2. Congestive Heart Failure  - HFrEF, Stage C, Class II-III, LVEF 30%: decreased from 55% (08/07/18).  - Appears fluid overloaded, weight today 174 lbs, up 7 lbs.   - Reinforced s/sx of worsening heart failure with patient and weight monitoring. Pt verbalizes understanding. Pt to call office or RTC if present.   - On torsemide 10 mg, lisinopril, and toprol  - Labs  pending    3. Chronic Anticoagulation: Coumadin  - No signs/symptoms of bleeding.   - [id number] score: 5  - INR Goal: 2-3, last INR 3.4 (07/2/19).   - Continue OAC with Coumadin,   - Referral to anticoagulation clinic to monitor closely, continue.     4. ESRD  - On dialysis with [facility] MWF.  - Right AV fistula  - Followed by nephrology, continue.     5. CAD  - No angina  - On statin  - Consideration for repeat echocardiogram to reevaluate LV function once in SR, if continues to be depressed, consideration for ischemic workup.     Plan reviewed in detail with the patient and he verbalizes understanding and is in agreement. RTC 4 weeks, sooner if clinical condition changes.     Thank you for allowing me to participate in this patients care.  Please contact me with any questions or concerns.     DESIREE Jiang.   [facility]  [phone]    Collaborating MD/ADD: Dr. Tamra MD.           Martha Light M.D.  [address]  [address]  [place] [address]  VIA In Basket

## 2019-07-10 NOTE — LETTER
"     [facility]   [address], [address]  [place] [address]  Phone: [phone]  Fax: [phone]              Parmjit Csatelan  1940    Encounter Date: 7/10/2019    ILAN Jiang          PROGRESS NOTE:  Cardiology/Electrophysiology Follow-up Note    Subjective:   Chief Complaint:   Chief Complaint   Patient presents with   • Atrial Flutter     Parmjit Castelan is a 78 y.o. male who presents today for atrial flutter.      He was last seen by ELIDA Pandya on 06/25/19 s/p hospitalization for atrial flutter. Underwent cardioversion on 06/13/19, JUSTIN negative for clot. He was on doing well at that time and continued on his Toprol  mg daily.  He called this week stating his heart rates were 120-130s bpm.     Medical history significant for newly diagnosed atrial flutter on chronic anticoagulation with Coumadin, ESRD with right AV fistula and dialysis MWF, CAD (no interventions), CHF Hypertension, hyperlipidemia, PVD with chronic claudication, BPH, HELGA- not on CPAP, COPD on 2.5L nasal cannula as needed, gout, and moderate AS.    Today in follow up, he states his heart rates have been in the 120-130s bpm for the past two days associated with palpitations and dyspnea with exertion.  He denies chest pain, dizziness, pre syncope or syncope.  He reports fluid retension for the past 1-2 weeks with lower extremity edema, even post dialysis.  Denies and signs or symptoms of bleeding or bleeding issues. Patient endorses medication compliance.     Past Medical History:   Diagnosis Date   • Anesthesia     \"needs more sedation\" (can sometimes hear MD during procedure)    • Anticoagulation monitoring, special range    • Aortic stenosis     mod AS- concern for low flow severe AS with rEFof 30%   • Arterial leg ulcer (HCC) 11/8/2018   • Atrial flutter (HCC) 6/12/2019   • Basal cell carcinoma of left cheek 3/26/2015   • BPH 7/14/2009   • Bronchitis 12/25/2018   • " CAD (coronary artery disease) 2/20/2014   • CATARACT     sanjuanita surgery complete   • CHF (congestive heart failure), NYHA class II, chronic, systolic ([facility]) E F30 in setting of atrial flutter 6/13/2019   • Chronic respiratory failure with hypoxia ([facility]) 5/8/2016   • CKD (chronic kidney disease) stage 4, GFR 15-29 ml/min ([facility]) 1/15/2010    end stage renal disease   • COPD (chronic obstructive pulmonary disease) ([facility])     wears 2.5 L o2 sometimes when he sleeps   • Detached retina    • Dialysis     m,w,f [facility]   • EMPHYSEMA    • Gout    • Heart burn     occas   • Heart murmur     maria esther lee cardiologist   • High cholesterol    • Hypertension    • Indigestion     occas   • Kidney cyst    • Leg pain, bilateral 8/10/2015   • On supplemental oxygen therapy    • Peripheral vascular disease ([facility]) 8/10/2015   • Pneumonia    • Primary insomnia 3/22/2018   • Proteinuria    • PVD (peripheral vascular disease) ([facility])    • Sleep apnea     O2 PER CANULA  AT NIGHT 2l/m     Past Surgical History:   Procedure Laterality Date   • JUSTIN  6/13/2019    Procedure: ECHOCARDIOGRAM, TRANSESOPHAGEAL;  Surgeon: Harvinder Hoang M.D.;  Location: [facility];  Service: Cardiac   • CARDIOVERSION  6/13/2019    Procedure: CARDIOVERSION;  Surgeon: Harvinder Hoang M.D.;  Location: [facility];  Service: Cardiac   • AV FISTULA CREATION Right 2/21/2019    Procedure: AV FISTULA CREATION - ARM;  Surgeon: David Cason M.D.;  Location: [facility];  Service: General   • FEMORAL ENDARTERECTOMY Left 1/25/2019    Procedure: FEMORAL ENDARTERECTOMY;  Surgeon: David Cason M.D.;  Location: [facility];  Service: General   • FEMORAL POPLITEAL BYPASS Left 1/25/2019    Procedure: LEFT FEMORAL POPLITEAL POLYTETRAFLUOROETHYLENE ePTFE(PROPATEN VASCULAR GRAFT) BYPASS;  Surgeon: David Cason M.D.;  Location: [facility];  Service: General   • ANGIOGRAM Left 1/25/2019    Procedure: LEFT  LEG ANGIOGRAM;  Surgeon: David Cason M.D.;  Location: SURGERY [facility];  Service: General   • ENDOSCOPY PROCEDURE  3/21/2018    Procedure: ENDOSCOPY PROCEDURE/UPPER;  Surgeon: Enrique Child D.O.;  Location: SURGERY [facility];  Service: Gastroenterology   • COLONOSCOPY - ENDO  8/15/2016    Procedure: COLONOSCOPY - ENDO;  Surgeon: Mane Whatley M.D.;  Location: ENDOSCOPY [facility];  Service:    • GASTROSCOPY WITH BIOPSY  8/13/2016    Procedure: GASTROSCOPY WITH BIOPSY;  Surgeon: Jorge Leavitt M.D.;  Location: ENDOSCOPY [facility];  Service:    • RECOVERY  12/23/2015    Procedure: VASCULAR CASE-CASON-LEFT ARM FISTULOGRAM WITH ANGIOPLASTY;  Surgeon: Recoveryonly Surgery;  [place] [facility];  Service:    • RECOVERY  3/24/2015    Performed by [facility] at SURGERY [facility] [place]   • RECOVERY  7/29/2014    Performed by Ir-Recovery Surgery at SURGERY SAME DAY [facility]   • RECOVERY  3/24/2014    Performed by Ir[facility]Recovery Surgery at SURGERY [facility]   • RECOVERY  12/17/2013    Performed by Ir-Recovery Surgery at SURGERY SAME DAY [facility]   • RECOVERY  7/2/2013    Performed by Ir-Recovery Surgery at SURGERY SAME DAY [facility]   • AV FISTULA THROMBOLYSIS  7/2/2013    Performed by David Cason M.D. at SURGERY [facility]   • RECOVERY  1/29/2013    Performed by Ir-Recovery Surgery at SURGERY SAME DAY [facility]   • RECOVERY  7/23/2012    Performed by SURGERY, IR-RECOVERY at SURGERY SAME DAY [facility]   • VITRECTOMY POSTERIOR  10/11/2011    Performed by NAHUM GE at SURGERY SAME DAY [facility]   • RECOVERY  8/12/2011    Performed by SURGERY, IR-RECOVERY at SURGERY SAME DAY [facility]   • VITRECTOMY POSTERIOR  1/18/2011    Performed by NAHUM GE at SURGERY SAME DAY [facility]   • SCLERAL BUCKLING  1/18/2011    Performed by NAHUM GE at SURGERY SAME DAY [facility]   • AV FISTULOGRAM  9/17/2010     Performed by ALESHIA MOSCOSO at SURGERY [facility]   • ANGIOPLASTY BALLOON  9/17/2010    Performed by ALESHIA MOSCOSO at SURGERY [facility]   • AV FISTULOGRAM  7/23/2010    Performed by ALESHIA MOSCOSO at SURGERY [facility]   • ANGIOPLASTY BALLOON  7/23/2010    Performed by ALESHIA MOSCOSO at SURGERY [facility]   • AV FISTULA REVISION  2/19/2010    Performed by WILLIE HATCH at SURGERY [facility]S   • AV FISTULA CREATION  2/12/2010    Performed by ALESHIA MOSCOSO at SURGERY [facility]   • CATARACT PHACO WITH IOL  4/8/08    Performed by STACEY PHILLIPS at SURGERY SAME DAY [facility]S   • OTHER ORTHOPEDIC SURGERY  1998    right toe for facititis     Family History   Problem Relation Age of Onset   • Stroke Mother    • Hypertension Mother    • Lung Disease Father         Emphysema, resp failure   • Genitourinary () Maternal Aunt         hematuria   • Hypertension Brother      Social History     Social History   • Marital status:      Spouse name: N/A   • Number of children: N/A   • Years of education: N/A     Occupational History   • Not on file.     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 60 Tab 1   • metoprolol SR (TOPROL XL) 100 MG TABLET SR 24 HR Take 1.5 Tabs by mouth every day. 45 Tab 3   • zolpidem (AMBIEN) 10 MG Tab Take 1 Tab by mouth every bedtime for 90 days. 90 Each 0   • torsemide  "(DEMADEX) 10 MG tablet Take 1 Tab by mouth every day. 30 Tab 3   • lisinopril (PRINIVIL) 5 MG Tab Take 1 Tab by mouth 2 Times a Day. 60 Tab 1   • warfarin (COUMADIN) 5 MG Tab Take 1 Tab by mouth every day. 30 Tab 3   • tamsulosin (FLOMAX) 0.4 MG capsule Take 0.8 mg by mouth every morning.     • omeprazole (PRILOSEC) 40 MG delayed-release capsule Take 40 mg by mouth every morning.     • traZODone (DESYREL) 150 MG Tab Take 300 mg by mouth every evening.     • calcium acetate (PHOS-LO) 667 MG Cap Take 2,001 mg by mouth 3 times a day, with meals. 2001mg three times a day with meals and 1334mg with snacks     • doxazosin (CARDURA) 4 MG Tab Take 4 mg by mouth every evening.     • pravastatin (PRAVACHOL) 20 MG Tab Take 20 mg by mouth every evening.     • Fluticasone-Umeclidin-Vilant (TRELEGY ELLIPTA) 100-62.5-25 MCG/INH AEROSOL POWDER, BREATH ACTIVATED Inhale 1 Puff by mouth every day. Rinse mouth after use 1 Each 11   • levalbuterol (XOPENEX) 1.25 MG/3ML Nebu Soln 3 mL by Nebulization route every four hours as needed for Shortness of Breath. 120 Bullet 11     No current facility-administered medications for this visit.      Review of Systems   Constitutional: Negative for chills, diaphoresis and fever.   Respiratory: Positive for shortness of breath (with exertion). Negative for cough and wheezing.    Cardiovascular: Positive for palpitations and leg swelling. Negative for chest pain, orthopnea, claudication and PND.   Gastrointestinal: Negative for abdominal pain and blood in stool.   Genitourinary: Negative for hematuria.   Neurological: Negative for dizziness and loss of consciousness.     All others systems reviewed and negative.   Objective:     /70 (BP Location: Left arm, Patient Position: Sitting, BP Cuff Size: Large adult)   Pulse 95   Ht 1.74 m (5' 8.5\")   Wt 79 kg (174 lb 3.2 oz)  Body mass index is 26.1 kg/m².    Physical Exam   Constitutional: He is oriented to person, place, and time. No distress.   "   HENT:   Head: Normocephalic.   Eyes: Pupils are equal, round, and reactive to light.   Neck: No JVD present.   Cardiovascular: Normal rate.  An irregularly irregular rhythm present.   No murmur heard.  Pulses:       Radial pulses are 2+ on the right side, and 1+ on the left side.        Dorsalis pedis pulses are 1+ on the right side, and 1+ on the left side.   Pulmonary/Chest: Effort normal and breath sounds normal. No respiratory distress. He has no wheezes. He has no rales. He exhibits no tenderness.   Abdominal: Soft. Bowel sounds are normal.   Musculoskeletal: He exhibits edema (generalized 1-2+).   Neurological: He is alert and oriented to person, place, and time.   Skin: Skin is warm and dry. He is not diaphoretic. No erythema.   Psychiatric: Mood, memory, affect and judgment normal.   Nursing note and vitals reviewed.    Cardiac Imaging and Procedures Review:    EKG 07/10/2019: Atrial Flutter, 3:1 block, RBBB    Echocardiogram (06/13/2019):   No thrombus detected in the left atrial appendage or other atrial   structures.  Moderately reduced left ventricular systolic function.  Left ventricular ejection fraction is visually estimated to be 30%.  Global hypokinesis.  Diastolic function is difficult to assess with atrial fibrillation.  Reduced right ventricular systolic function.  Mild mitral regurgitation.  Moderate aortic stenosis.  Mild aortic insufficiency.  Moderate pericardial effusion WITHOUT evidence of hemodynamic   compromise.  Compared to the images of the transthoracic echocardiogram dated   8/7/2018, the ejection fraction is further reduced previously low   normal.  It is difficult to compare the size of the pericardial   effusion due to technique but it was previously large.    Labs (personally reviewed and notable for):   Lab Results   Component Value Date/Time    SODIUM 138 06/28/2019 12:45 PM    POTASSIUM 5.1 06/28/2019 12:45 PM    CHLORIDE 96 06/28/2019 12:45 PM    CO2 23 06/28/2019 12:45 PM     GLUCOSE 111 (H) 06/28/2019 12:45 PM    BUN 86 (HH) 06/28/2019 12:45 PM    CREATININE 10.82 (HH) 06/28/2019 12:45 PM    CREATININE 2.1 (H) 05/06/2009 10:08 AM      Lab Results   Component Value Date/Time    WBC 3.3 (L) 06/28/2019 12:45 PM    RBC 2.97 (L) 06/28/2019 12:45 PM    HEMOGLOBIN 9.5 (L) 06/28/2019 12:45 PM    HEMATOCRIT 30.3 (L) 06/28/2019 12:45 PM    .0 (H) 06/28/2019 12:45 PM    MCH 32.0 06/28/2019 12:45 PM    MCHC 31.4 (L) 06/28/2019 12:45 PM    MPV 9.5 06/28/2019 12:45 PM    NEUTSPOLYS 75.40 (H) 06/28/2019 12:45 PM    LYMPHOCYTES 10.00 (L) 06/28/2019 12:45 PM    MONOCYTES 11.90 06/28/2019 12:45 PM    EOSINOPHILS 2.10 06/28/2019 12:45 PM    BASOPHILS 0.60 06/28/2019 12:45 PM      PT/INR:   Lab Results   Component Value Date/Time    PROTHROMBTM 35.9 (H) 07/01/2019 12:00 AM    PROTHROMBTM 32.1 (H) 06/28/2019 12:45 PM    INR 3.40 07/02/2019   ]  Assessment:     1. Atrial flutter, unspecified type (HCC)  EKG    REFERRAL TO ANTICOAGULATION MONITORING    CL-CARDIOVERSION   2. ESRD (end stage renal disease) on dialysis (HCC)     3. HELGA (obstructive sleep apnea)     4. Chronic anticoagulation  REFERRAL TO ANTICOAGULATION MONITORING   5. Essential hypertension       Medical Decision Making:  Today's Assessment / Status / Plan:   1. Atrial Flutter  - Mildly symptomatic, palpitations, ARIAS.  - Hx of cardioversion (last DCCV 06/13/19),   - Per echo (06/13/19) Moderately reduced LV function, Global hypokinesis, EF 30%, mild MR, moderate AS, mild AI, moderate pericardial effusion without hemodynamic compromise.   - EKG today showed typical atrial flutter with 3:1, no acute findings.   - On OAC with Coumadin, continue.  - On metoprolol 150 mg daily. Patient encouraged to monitor BP and call office if Hrs remain elevated.   - Discussed options including rate control, addition of an antiarrhythmic, and ablation. Patient wishes to proceed with ablation in future.  - Discussed with Dr. Grissom, per his recommendations,  will continue BB, add amiodarone 400 mg BID until cardioversion, and schedule patient for cardioversion for next week. Will also schedule for atrial flutter ablation.  to call to schedule. Call to notify patient of plan.  All question/concerns were answered.   - ER precautions discussed.     2. Congestive Heart Failure  - HFrEF, Stage C, Class II-III, LVEF 30%: decreased from 55% (08/07/18).  - Appears fluid overloaded, weight today 174 lbs, up 7 lbs.   - Reinforced s/sx of worsening heart failure with patient and weight monitoring. Pt verbalizes understanding. Pt to call office or RTC if present.   - On torsemide 10 mg, lisinopril, and toprol  - Labs pending    3. Chronic Anticoagulation: Coumadin  - No signs/symptoms of bleeding.   - [id number] score: 5  - INR Goal: 2-3, last INR 3.4 (07/2/19).   - Continue OAC with Coumadin,   - Referral to anticoagulation clinic to monitor closely, continue.     4. ESRD  - On dialysis with [facility] MWF.  - Right AV fistula  - Followed by nephrology, continue.     5. CAD  - No angina  - On statin  - Consideration for repeat echocardiogram to reevaluate LV function once in SR, if continues to be depressed, consideration for ischemic workup.     Plan reviewed in detail with the patient and he verbalizes understanding and is in agreement. RTC 4 weeks, sooner if clinical condition changes.     Thank you for allowing me to participate in this patients care.  Please contact me with any questions or concerns.     DESIREE Jiang.   [facility]  [phone]    Collaborating MD/ADD: Dr. Tamra MD.           Martha Light M.D.  [address]  [address]  [place] [address]  VIA In Basket

## 2019-07-10 NOTE — LETTER
"     [facility]   [address], [address]  [place] [address]  Phone: [phone]  Fax: [phone]              Parmjit Castelan  1940    Encounter Date: 7/10/2019    ILAN Jiang          PROGRESS NOTE:  Cardiology/Electrophysiology Follow-up Note    Subjective:   Chief Complaint:   Chief Complaint   Patient presents with   • Atrial Flutter     Parmjit Castelan is a 78 y.o. male who presents today for atrial flutter.      He is followed by  *** and was last seen by ELIDA Pandya on 06/25***/19 s/p hospitalization for atrial flutter.  Underwent successful cardioversion on 06/***/19. He was on doing well at that time and continued on his Toprol  mg daily.  He called this week stating his heart rates were 120-130s bpm.     Medical history significant for newly diagnosed atrial flutter on chronic anticoagulation with Coumadin, ESRD with right AV fistula and dialysis MWF, CAD (no interventions), CHF Hypertension, hyperlipidemia, PVD with chronic claudication***, BPH, HELGA on CPAP***?, COPD on 2.5L nasal cannula at night***, gout, moderate AS.    Today in follow up, he states his heart rates have been in the 120-130s bpm for the past two days associated with palpitations and dyspnea with exertion.  He denies chest pain, dizziness, pre syncope or syncope.  He reports fluid retension for the past 1-2 weeks with lower extremity edema, even post dialysis.  Denies and signs or symptoms of bleeding or bleeding issues. Patient endorses medication compliance.     Past Medical History:   Diagnosis Date   • Anesthesia     \"needs more sedation\" (can sometimes hear MD during procedure)    • Anticoagulation monitoring, special range    • Aortic stenosis     mod AS- concern for low flow severe AS with rEFof 30%   • Arterial leg ulcer (HCC) 11/8/2018   • Atrial flutter (HCC) 6/12/2019   • Basal cell carcinoma of left cheek 3/26/2015   • BPH 7/14/2009   • " Bronchitis 12/25/2018   • CAD (coronary artery disease) 2/20/2014   • CATARACT     sanjuanita surgery complete   • CHF (congestive heart failure), NYHA class II, chronic, systolic ([facility]) E F30 in setting of atrial flutter 6/13/2019   • Chronic respiratory failure with hypoxia ([facility]) 5/8/2016   • CKD (chronic kidney disease) stage 4, GFR 15-29 ml/min ([facility]) 1/15/2010    end stage renal disease   • COPD (chronic obstructive pulmonary disease) ([facility])     wears 2.5 L o2 sometimes when he sleeps   • Detached retina    • Dialysis     m,w,f davita   • EMPHYSEMA    • Gout    • Heart burn     occas   • Heart murmur     maria esther lee cardiologist   • High cholesterol    • Hypertension    • Indigestion     occas   • Kidney cyst    • Leg pain, bilateral 8/10/2015   • On supplemental oxygen therapy    • Peripheral vascular disease ([facility]) 8/10/2015   • Pneumonia    • Primary insomnia 3/22/2018   • Proteinuria    • PVD (peripheral vascular disease) ([facility])    • Sleep apnea     O2 PER CANULA  AT NIGHT 2l/m     Past Surgical History:   Procedure Laterality Date   • JUSTIN  6/13/2019    Procedure: ECHOCARDIOGRAM, TRANSESOPHAGEAL;  Surgeon: Harvinder Hoang M.D.;  Location: [facility];  Service: Cardiac   • CARDIOVERSION  6/13/2019    Procedure: CARDIOVERSION;  Surgeon: Harvinder Hoang M.D.;  Location: [facility];  Service: Cardiac   • AV FISTULA CREATION Right 2/21/2019    Procedure: AV FISTULA CREATION - ARM;  Surgeon: David Cason M.D.;  Location: SURGERY [facility];  Service: General   • FEMORAL ENDARTERECTOMY Left 1/25/2019    Procedure: FEMORAL ENDARTERECTOMY;  Surgeon: David Cason M.D.;  Location: SURGERY [facility];  Service: General   • FEMORAL POPLITEAL BYPASS Left 1/25/2019    Procedure: LEFT FEMORAL POPLITEAL POLYTETRAFLUOROETHYLENE ePTFE(PROPATEN VASCULAR GRAFT) BYPASS;  Surgeon: David Cason M.D.;  Location: SURGERY [facility];  Service: General   • ANGIOGRAM Left  1/25/2019    Procedure: LEFT LEG ANGIOGRAM;  Surgeon: David Cason M.D.;  Location: SURGERY [facility];  Service: General   • ENDOSCOPY PROCEDURE  3/21/2018    Procedure: ENDOSCOPY PROCEDURE/UPPER;  Surgeon: Enrique Child D.O.;  Location: SURGERY [facility];  Service: Gastroenterology   • COLONOSCOPY - ENDO  8/15/2016    Procedure: COLONOSCOPY - ENDO;  Surgeon: Mane Whatley M.D.;  Location: ENDOSCOPY [facility];  Service:    • GASTROSCOPY WITH BIOPSY  8/13/2016    Procedure: GASTROSCOPY WITH BIOPSY;  Surgeon: Jorge Leavitt M.D.;  Location: ENDOSCOPY [facility];  Service:    • RECOVERY  12/23/2015    Procedure: VASCULAR CASE-CASON-LEFT ARM FISTULOGRAM WITH ANGIOPLASTY;  Surgeon: Recoveryonly Surgery;  Location: SURGERY PRE-POST PROC UNIT [facility];  Service:    • RECOVERY  3/24/2015    Performed by Recoveryonly Surgery at SURGERY PRE-POST PROC UNIT [facility]   • RECOVERY  7/29/2014    Performed by [facility]Recovery Surgery at SURGERY SAME DAY [facility]   • RECOVERY  3/24/2014    Performed by Ir[facility]Recovery Surgery at SURGERY [facility]   • RECOVERY  12/17/2013    Performed by Ir[facility]Recovery Surgery at SURGERY SAME DAY [facility]   • RECOVERY  7/2/2013    Performed by [facility]Recovery Surgery at SURGERY SAME DAY [facility]   • AV FISTULA THROMBOLYSIS  7/2/2013    Performed by David Cason M.D. at SURGERY [facility]   • RECOVERY  1/29/2013    Performed by Ir-Recovery Surgery at SURGERY SAME DAY [facility]   • RECOVERY  7/23/2012    Performed by SURGERY, IR-RECOVERY at SURGERY SAME DAY [facility]S   • VITRECTOMY POSTERIOR  10/11/2011    Performed by NAHUM GE at SURGERY SAME DAY [facility] ORS   • RECOVERY  8/12/2011    Performed by SURGERY, IR-RECOVERY at SURGERY SAME DAY [facility]   • VITRECTOMY POSTERIOR  1/18/2011    Performed by NAHUM GE at SURGERY SAME DAY [facility]S   • SCLERAL BUCKLING  1/18/2011    Performed by NAHUM GE at SURGERY SAME DAY [facility]S     • AV FISTULOGRAM  9/17/2010    Performed by ALESHIA MOSCOSO at SURGERY [facility]S   • ANGIOPLASTY BALLOON  9/17/2010    Performed by ALESHIA MOSCOSO at SURGERY [facility]S   • AV FISTULOGRAM  7/23/2010    Performed by ALESHIA MOSCOSO at SURGERY [facility]S   • ANGIOPLASTY BALLOON  7/23/2010    Performed by ALESHIA MOSCOSO at SURGERY [facility]S   • AV FISTULA REVISION  2/19/2010    Performed by WILLEI HATCH at SURGERY [facility]S   • AV FISTULA CREATION  2/12/2010    Performed by ALESHIA MOSCOSO at SURGERY [facility]   • CATARACT PHACO WITH IOL  4/8/08    Performed by STACEY PHILLIPS at SURGERY SAME DAY [facility]   • OTHER ORTHOPEDIC SURGERY  1998    right toe for facititis     Family History   Problem Relation Age of Onset   • Stroke Mother    • Hypertension Mother    • Lung Disease Father         Emphysema, resp failure   • Genitourinary () Maternal Aunt         hematuria   • Hypertension Brother      Social History     Social History   • Marital status:      Spouse name: N/A   • Number of children: N/A   • Years of education: N/A     Occupational History   • Not on file.     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 3 Tab 3   • amiodarone (CORDARONE) 200 MG Tab Take 2 Tabs by mouth every day. 60 Tab 2   • zolpidem (AMBIEN) 10 MG Tab Take 1 Tab by mouth every bedtime for 90 days.  "90 Each 0   • torsemide (DEMADEX) 10 MG tablet Take 1 Tab by mouth every day. 30 Tab 3   • lisinopril (PRINIVIL) 5 MG Tab Take 1 Tab by mouth 2 Times a Day. 60 Tab 1   • warfarin (COUMADIN) 5 MG Tab Take 1 Tab by mouth every day. 30 Tab 3   • tamsulosin (FLOMAX) 0.4 MG capsule Take 0.8 mg by mouth every morning.     • omeprazole (PRILOSEC) 40 MG delayed-release capsule Take 40 mg by mouth every morning.     • traZODone (DESYREL) 150 MG Tab Take 300 mg by mouth every evening.     • calcium acetate (PHOS-LO) 667 MG Cap Take 2,001 mg by mouth 3 times a day, with meals. 2001mg three times a day with meals and 1334mg with snacks     • doxazosin (CARDURA) 4 MG Tab Take 4 mg by mouth every evening.     • pravastatin (PRAVACHOL) 20 MG Tab Take 20 mg by mouth every evening.     • Fluticasone-Umeclidin-Vilant (TRELEGY ELLIPTA) 100-62.5-25 MCG/INH AEROSOL POWDER, BREATH ACTIVATED Inhale 1 Puff by mouth every day. Rinse mouth after use 1 Each 11   • levalbuterol (XOPENEX) 1.25 MG/3ML Nebu Soln 3 mL by Nebulization route every four hours as needed for Shortness of Breath. 120 Bullet 11     No current facility-administered medications for this visit.        Review of Systems   Constitutional: Negative for chills, diaphoresis and fever.   Respiratory: Positive for shortness of breath (with exertion). Negative for cough and wheezing.    Cardiovascular: Positive for palpitations and leg swelling. Negative for chest pain, orthopnea, claudication and PND.   Gastrointestinal: Negative for abdominal pain and blood in stool.   Genitourinary: Negative for hematuria.   Neurological: Negative for dizziness and loss of consciousness.     All others systems reviewed and negative.   Objective:     /70 (BP Location: Left arm, Patient Position: Sitting, BP Cuff Size: Large adult)   Pulse 95   Ht 1.74 m (5' 8.5\")   Wt 79 kg (174 lb 3.2 oz)  Body mass index is 26.1 kg/m².    Physical Exam   Constitutional: He is oriented to person, place, " and time. No distress.   HENT:   Head: Normocephalic.   Eyes: Pupils are equal, round, and reactive to light.   Neck: No JVD present.   Cardiovascular: Normal rate.  An irregularly irregular rhythm present.   No murmur heard.  Pulses:       Radial pulses are 2+ on the right side, and 1+ on the left side.        Dorsalis pedis pulses are 1+ on the right side, and 1+ on the left side.   Pulmonary/Chest: Effort normal and breath sounds normal. No respiratory distress. He has no wheezes. He has no rales. He exhibits no tenderness.   Abdominal: Soft. Bowel sounds are normal.   Musculoskeletal: He exhibits edema (generalized 1-2+).   Neurological: He is alert and oriented to person, place, and time.   Skin: Skin is warm and dry. He is not diaphoretic. No erythema.   Psychiatric: Mood, memory, affect and judgment normal.   Nursing note and vitals reviewed.    Cardiac Imaging and Procedures Review:    EKG 07/10/2019: Atrial Flutter, 3:1 block, RBBB    Echocardiogram (06/13/2019):   No thrombus detected in the left atrial appendage or other atrial   structures.  Moderately reduced left ventricular systolic function.  Left ventricular ejection fraction is visually estimated to be 30%.  Global hypokinesis.  Diastolic function is difficult to assess with atrial fibrillation.  Reduced right ventricular systolic function.  Mild mitral regurgitation.  Moderate aortic stenosis.  Mild aortic insufficiency.  Moderate pericardial effusion WITHOUT evidence of hemodynamic   compromise.  Compared to the images of the transthoracic echocardiogram dated   8/7/2018, the ejection fraction is further reduced previously low   normal.  It is difficult to compare the size of the pericardial   effusion due to technique but it was previously large.    Labs (personally reviewed and notable for):   Lab Results   Component Value Date/Time    SODIUM 138 06/28/2019 12:45 PM    POTASSIUM 5.1 06/28/2019 12:45 PM    CHLORIDE 96 06/28/2019 12:45 PM    CO2  23 06/28/2019 12:45 PM    GLUCOSE 111 (H) 06/28/2019 12:45 PM    BUN 86 (HH) 06/28/2019 12:45 PM    CREATININE 10.82 (HH) 06/28/2019 12:45 PM    CREATININE 2.1 (H) 05/06/2009 10:08 AM      Lab Results   Component Value Date/Time    WBC 3.3 (L) 06/28/2019 12:45 PM    RBC 2.97 (L) 06/28/2019 12:45 PM    HEMOGLOBIN 9.5 (L) 06/28/2019 12:45 PM    HEMATOCRIT 30.3 (L) 06/28/2019 12:45 PM    .0 (H) 06/28/2019 12:45 PM    MCH 32.0 06/28/2019 12:45 PM    MCHC 31.4 (L) 06/28/2019 12:45 PM    MPV 9.5 06/28/2019 12:45 PM    NEUTSPOLYS 75.40 (H) 06/28/2019 12:45 PM    LYMPHOCYTES 10.00 (L) 06/28/2019 12:45 PM    MONOCYTES 11.90 06/28/2019 12:45 PM    EOSINOPHILS 2.10 06/28/2019 12:45 PM    BASOPHILS 0.60 06/28/2019 12:45 PM      PT/INR:   Lab Results   Component Value Date/Time    PROTHROMBTM 35.9 (H) 07/01/2019 12:00 AM    PROTHROMBTM 32.1 (H) 06/28/2019 12:45 PM    INR 3.40 07/02/2019   ]  Assessment:     1. Atrial flutter, unspecified type (HCC)  EKG    REFERRAL TO ANTICOAGULATION MONITORING    CL-CARDIOVERSION   2. ESRD (end stage renal disease) on dialysis (HCC)     3. HELGA (obstructive sleep apnea)     4. Chronic anticoagulation  REFERRAL TO ANTICOAGULATION MONITORING   5. Essential hypertension       Medical Decision Making:  Today's Assessment / Status / Plan:   1. Atrial Flutter  - Mildly symptomatic, palpitations, ARIAS.  - Hx of cardioversion (last DCCV 06/13/19),   - Per echo (06/13/19) Moderately reduced LV function, Global hypokinesis, EF 30%, mild MR, moderate AS, mild AI, moderate pericardial effusion without hemodynamic compromise.   - EKG today showed typical atrial flutter with 3:1, no acute findings.   - On OAC with Coumadin, continue.  - On metoprolol 150 mg daily. Patient encouraged to monitor BP and call office if Hrs remain elevated.   - Discussed options including rate control, addition of an antiarrhythmic, and ablation. Patient wishes to proceed with ablation in future.  - Discussed with Dr. Grissom,  per his recommendations, will continue BB, add amiodarone 200 mg BID until cardioversion, and schedule patient for cardioversion for next week. Will also schedule for atrial flutter ablation.     2. Congestive Heart Failure  - HFrEF, Stage C, Class II-III, LVEF 30%: decreased from 55% (08/07/18).  - Appears fluid overloaded, weight today 174 lbs, up 7 lbs.   - Reinforced s/sx of worsening heart failure with patient and weight monitoring. Pt verbalizes understanding. Pt to call office or RTC if present.   - On torsemide 10 mg, lisinopril, and toprol  - Labs pending    3. Chronic Anticoagulation: Coumadin  - No signs/symptoms of bleeding.   - [id number] score: 5  - INR Goal: 2-3, last INR 3.4 (07/2/19).   - Continue OAC with Coumadin,   - Referral to anticoagulation clinic to monitor closely, continue.     4. ESRD  - On dialysis with Divita MWF.  - Right AV fistula  - Followed by nephrology, continue.     5. CAD  - No angina  - On statin  - Consideration for repeat echocardiogram to reevaluate LV function once in SR, if continues to be depressed, consideration for ischemic workup.     Plan reviewed in detail with the patient and he verbalizes understanding and is in agreement. RTC 4 weeks, sooner if clinical condition changes.     Thank you for allowing me to participate in this patients care.  Please contact me with any questions or concerns.     DESIREE Jiang.   [facility]  [phone]    Collaborating MD/ADD: Dr. Tamra MD.      Cardiology/Electrophysiology Follow-up Note    Subjective:   Chief Complaint:   Chief Complaint   Patient presents with   • Atrial Flutter     Parmjit Castelan is a 78 y.o. male who presents today for atrial flutter.      He was last seen by ELIDA Pandya on 06/25/19 s/p hospitalization for atrial flutter. Underwent cardioversion on 06/13/19, JUSTIN negative for clot. He was on doing well at that time and continued on his Toprol  " mg daily.  He called this week stating his heart rates were 120-130s bpm.     Medical history significant for newly diagnosed atrial flutter on chronic anticoagulation with Coumadin, ESRD with right AV fistula and dialysis MWF, CAD (no interventions), CHF Hypertension, hyperlipidemia, PVD with chronic claudication, BPH, HELGA- not on CPAP, COPD on 2.5L nasal cannula as needed, gout, and moderate AS.    Today in follow up, he states his heart rates have been in the 120-130s bpm for the past two days associated with palpitations and dyspnea with exertion.  He denies chest pain, dizziness, pre syncope or syncope.  He reports fluid retension for the past 1-2 weeks with lower extremity edema, even post dialysis.  Denies and signs or symptoms of bleeding or bleeding issues. Patient endorses medication compliance.     Past Medical History:   Diagnosis Date   • Anesthesia     \"needs more sedation\" (can sometimes hear MD during procedure)    • Anticoagulation monitoring, special range    • Aortic stenosis     mod AS- concern for low flow severe AS with rEFof 30%   • Arterial leg ulcer ([facility]) 11/8/2018   • Atrial flutter ([facility]) 6/12/2019   • Basal cell carcinoma of left cheek 3/26/2015   • BPH 7/14/2009   • Bronchitis 12/25/2018   • CAD (coronary artery disease) 2/20/2014   • CATARACT     sanjuanita surgery complete   • CHF (congestive heart failure), NYHA class II, chronic, systolic ([facility]) E F30 in setting of atrial flutter 6/13/2019   • Chronic respiratory failure with hypoxia ([facility]) 5/8/2016   • CKD (chronic kidney disease) stage 4, GFR 15-29 ml/min ([facility]) 1/15/2010    end stage renal disease   • COPD (chronic obstructive pulmonary disease) ([facility])     wears 2.5 L o2 sometimes when he sleeps   • Detached retina    • Dialysis     m,w,f davita   • EMPHYSEMA    • Gout    • Heart burn     occas   • Heart murmur     maria esther lee cardiologist   • High cholesterol    • Hypertension    • Indigestion     occas   • Kidney cyst    • Leg " pain, bilateral 8/10/2015   • On supplemental oxygen therapy    • Peripheral vascular disease (HCC) 8/10/2015   • Pneumonia    • Primary insomnia 3/22/2018   • Proteinuria    • PVD (peripheral vascular disease) (HCC)    • Sleep apnea     O2 PER CANULA  AT NIGHT 2l/m     Past Surgical History:   Procedure Laterality Date   • JUSTIN  6/13/2019    Procedure: ECHOCARDIOGRAM, TRANSESOPHAGEAL;  Surgeon: Harvinder Hoang M.D.;  Location: [facility];  Service: Cardiac   • CARDIOVERSION  6/13/2019    Procedure: CARDIOVERSION;  Surgeon: Harvinder Hoang M.D.;  Location: [facility];  Service: Cardiac   • AV FISTULA CREATION Right 2/21/2019    Procedure: AV FISTULA CREATION - ARM;  Surgeon: David Cason M.D.;  Location: SURGERY [facility];  Service: General   • FEMORAL ENDARTERECTOMY Left 1/25/2019    Procedure: FEMORAL ENDARTERECTOMY;  Surgeon: David Cason M.D.;  Location: SURGERY [facility];  Service: General   • FEMORAL POPLITEAL BYPASS Left 1/25/2019    Procedure: LEFT FEMORAL POPLITEAL POLYTETRAFLUOROETHYLENE ePTFE(PROPATEN VASCULAR GRAFT) BYPASS;  Surgeon: David Cason M.D.;  Location: SURGERY [facility];  Service: General   • ANGIOGRAM Left 1/25/2019    Procedure: LEFT LEG ANGIOGRAM;  Surgeon: David Cason M.D.;  Location: SURGERY [facility];  Service: General   • ENDOSCOPY PROCEDURE  3/21/2018    Procedure: ENDOSCOPY PROCEDURE/UPPER;  Surgeon: Enrique Child D.O.;  Location: SURGERY [facility];  Service: Gastroenterology   • COLONOSCOPY - ENDO  8/15/2016    Procedure: COLONOSCOPY - ENDO;  Surgeon: Mane Whatley M.D.;  Location: ENDOSCOPY [facility];  Service:    • GASTROSCOPY WITH BIOPSY  8/13/2016    Procedure: GASTROSCOPY WITH BIOPSY;  Surgeon: Jorge Leavitt M.D.;  Location: ENDOSCOPY [facility];  Service:    • RECOVERY  12/23/2015    Procedure: VASCULAR CASE-CASON-LEFT ARM FISTULOGRAM WITH ANGIOPLASTY;  Surgeon:  Recoveryonly Surgery;  Location: SURGERY PRE-POST PROC UNIT [facility] [place];  Service:    • RECOVERY  3/24/2015    Performed by Recoveryonly Surgery at SURGERY PRE-POST PROC UNIT [facility] [place] [place]   • RECOVERY  7/29/2014    Performed by Ir-Recovery Surgery at SURGERY SAME DAY [facility]S   • RECOVERY  3/24/2014    Performed by Ir-Recovery Surgery at SURGERY [facility]   • RECOVERY  12/17/2013    Performed by Ir-Recovery Surgery at SURGERY SAME DAY [facility]S   • RECOVERY  7/2/2013    Performed by Ir-Recovery Surgery at SURGERY SAME DAY [facility]S   • AV FISTULA THROMBOLYSIS  7/2/2013    Performed by David Cason M.D. at SURGERY [facility] ORS   • RECOVERY  1/29/2013    Performed by Ir-Recovery Surgery at SURGERY SAME DAY [facility]S   • RECOVERY  7/23/2012    Performed by SURGERY, IR-RECOVERY at SURGERY SAME DAY [facility] ORS   • VITRECTOMY POSTERIOR  10/11/2011    Performed by NAHUM GE at SURGERY SAME DAY [facility] ORS   • RECOVERY  8/12/2011    Performed by SURGERY, IR-RECOVERY at SURGERY SAME DAY [facility] ORS   • VITRECTOMY POSTERIOR  1/18/2011    Performed by NAHUM GE at SURGERY SAME DAY [facility] ORS   • SCLERAL BUCKLING  1/18/2011    Performed by NAHUM GE at SURGERY SAME DAY [facility] ORS   • AV FISTULOGRAM  9/17/2010    Performed by DAVID CASON at SURGERY [facility]S   • ANGIOPLASTY BALLOON  9/17/2010    Performed by ADVID CASON at SURGERY [facility]S   • AV FISTULOGRAM  7/23/2010    Performed by DAVID CASON at SURGERY [facility]S   • ANGIOPLASTY BALLOON  7/23/2010    Performed by DAVID CASON at SURGERY [facility]   • AV FISTULA REVISION  2/19/2010    Performed by WILLIE HATCH at SURGERY [facility]S   • AV FISTULA CREATION  2/12/2010    Performed by DAVID CASON at SURGERY [facility] ORS   • CATARACT PHACO WITH IOL  4/8/08    Performed by STACEY PHILLIPS at SURGERY SAME DAY [facility] ORS   • OTHER ORTHOPEDIC SURGERY  1998    right  toe for facititis     Family History   Problem Relation Age of Onset   • Stroke Mother    • Hypertension Mother    • Lung Disease Father         Emphysema, resp failure   • Genitourinary () Maternal Aunt         hematuria   • Hypertension Brother      Social History     Social History   • Marital status:      Spouse name: N/A   • Number of children: N/A   • Years of education: N/A     Occupational History   • Not on file.     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 60 Tab 1   • metoprolol SR (TOPROL XL) 100 MG TABLET SR 24 HR Take 1.5 Tabs by mouth every day. 45 Tab 3   • zolpidem (AMBIEN) 10 MG Tab Take 1 Tab by mouth every bedtime for 90 days. 90 Each 0   • torsemide (DEMADEX) 10 MG tablet Take 1 Tab by mouth every day. 30 Tab 3   • lisinopril (PRINIVIL) 5 MG Tab Take 1 Tab by mouth 2 Times a Day. 60 Tab 1   • warfarin (COUMADIN) 5 MG Tab Take 1 Tab by mouth every day. 30 Tab 3   • tamsulosin (FLOMAX) 0.4 MG capsule Take 0.8 mg by mouth every morning.     • omeprazole (PRILOSEC) 40 MG delayed-release capsule Take 40 mg by mouth every morning.     • traZODone (DESYREL) 150 MG Tab Take 300 mg by mouth every evening.     • calcium acetate (PHOS-LO) 667 MG Cap Take 2,001 mg by mouth 3 times a day, with meals. 2001mg three times a day with meals and 1334mg with snacks     • doxazosin (CARDURA) 4 MG Tab Take 4 mg by mouth every evening.     •  "pravastatin (PRAVACHOL) 20 MG Tab Take 20 mg by mouth every evening.     • Fluticasone-Umeclidin-Vilant (TRELEGY ELLIPTA) 100-62.5-25 MCG/INH AEROSOL POWDER, BREATH ACTIVATED Inhale 1 Puff by mouth every day. Rinse mouth after use 1 Each 11   • levalbuterol (XOPENEX) 1.25 MG/3ML Nebu Soln 3 mL by Nebulization route every four hours as needed for Shortness of Breath. 120 Bullet 11     No current facility-administered medications for this visit.        Review of Systems   Constitutional: Negative for chills, diaphoresis and fever.   Respiratory: Positive for shortness of breath (with exertion). Negative for cough and wheezing.    Cardiovascular: Positive for palpitations and leg swelling. Negative for chest pain, orthopnea, claudication and PND.   Gastrointestinal: Negative for abdominal pain and blood in stool.   Genitourinary: Negative for hematuria.   Neurological: Negative for dizziness and loss of consciousness.     All others systems reviewed and negative.   Objective:     /70 (BP Location: Left arm, Patient Position: Sitting, BP Cuff Size: Large adult)   Pulse 95   Ht 1.74 m (5' 8.5\")   Wt 79 kg (174 lb 3.2 oz)  Body mass index is 26.1 kg/m².    Physical Exam   Constitutional: He is oriented to person, place, and time. No distress.   HENT:   Head: Normocephalic.   Eyes: Pupils are equal, round, and reactive to light.   Neck: No JVD present.   Cardiovascular: Normal rate.  An irregularly irregular rhythm present.   No murmur heard.  Pulses:       Radial pulses are 2+ on the right side, and 1+ on the left side.        Dorsalis pedis pulses are 1+ on the right side, and 1+ on the left side.   Pulmonary/Chest: Effort normal and breath sounds normal. No respiratory distress. He has no wheezes. He has no rales. He exhibits no tenderness.   Abdominal: Soft. Bowel sounds are normal.   Musculoskeletal: He exhibits edema (generalized 1-2+).   Neurological: He is alert and oriented to person, place, and time.   "   Skin: Skin is warm and dry. He is not diaphoretic. No erythema.   Psychiatric: Mood, memory, affect and judgment normal.   Nursing note and vitals reviewed.    Cardiac Imaging and Procedures Review:    EKG 07/10/2019: Atrial Flutter, 3:1 block, RBBB    Echocardiogram (06/13/2019):   No thrombus detected in the left atrial appendage or other atrial   structures.  Moderately reduced left ventricular systolic function.  Left ventricular ejection fraction is visually estimated to be 30%.  Global hypokinesis.  Diastolic function is difficult to assess with atrial fibrillation.  Reduced right ventricular systolic function.  Mild mitral regurgitation.  Moderate aortic stenosis.  Mild aortic insufficiency.  Moderate pericardial effusion WITHOUT evidence of hemodynamic   compromise.  Compared to the images of the transthoracic echocardiogram dated   8/7/2018, the ejection fraction is further reduced previously low   normal.  It is difficult to compare the size of the pericardial   effusion due to technique but it was previously large.    Labs (personally reviewed and notable for):   Lab Results   Component Value Date/Time    SODIUM 138 06/28/2019 12:45 PM    POTASSIUM 5.1 06/28/2019 12:45 PM    CHLORIDE 96 06/28/2019 12:45 PM    CO2 23 06/28/2019 12:45 PM    GLUCOSE 111 (H) 06/28/2019 12:45 PM    BUN 86 (HH) 06/28/2019 12:45 PM    CREATININE 10.82 (HH) 06/28/2019 12:45 PM    CREATININE 2.1 (H) 05/06/2009 10:08 AM      Lab Results   Component Value Date/Time    WBC 3.3 (L) 06/28/2019 12:45 PM    RBC 2.97 (L) 06/28/2019 12:45 PM    HEMOGLOBIN 9.5 (L) 06/28/2019 12:45 PM    HEMATOCRIT 30.3 (L) 06/28/2019 12:45 PM    .0 (H) 06/28/2019 12:45 PM    MCH 32.0 06/28/2019 12:45 PM    MCHC 31.4 (L) 06/28/2019 12:45 PM    MPV 9.5 06/28/2019 12:45 PM    NEUTSPOLYS 75.40 (H) 06/28/2019 12:45 PM    LYMPHOCYTES 10.00 (L) 06/28/2019 12:45 PM    MONOCYTES 11.90 06/28/2019 12:45 PM    EOSINOPHILS 2.10 06/28/2019 12:45 PM    BASOPHILS  0.60 06/28/2019 12:45 PM      PT/INR:   Lab Results   Component Value Date/Time    PROTHROMBTM 35.9 (H) 07/01/2019 12:00 AM    PROTHROMBTM 32.1 (H) 06/28/2019 12:45 PM    INR 3.40 07/02/2019   ]  Assessment:     1. Atrial flutter, unspecified type (HCC)  EKG    REFERRAL TO ANTICOAGULATION MONITORING    CL-CARDIOVERSION   2. ESRD (end stage renal disease) on dialysis (HCC)     3. HELGA (obstructive sleep apnea)     4. Chronic anticoagulation  REFERRAL TO ANTICOAGULATION MONITORING   5. Essential hypertension       Medical Decision Making:  Today's Assessment / Status / Plan:   1. Atrial Flutter  - Mildly symptomatic, palpitations, ARIAS.  - Hx of cardioversion (last DCCV 06/13/19),   - Per echo (06/13/19) Moderately reduced LV function, Global hypokinesis, EF 30%, mild MR, moderate AS, mild AI, moderate pericardial effusion without hemodynamic compromise.   - EKG today showed typical atrial flutter with 3:1, no acute findings.   - On OAC with Coumadin, continue.  - On metoprolol 150 mg daily. Patient encouraged to monitor BP and call office if Hrs remain elevated.   - Discussed options including rate control, addition of an antiarrhythmic, and ablation. Patient wishes to proceed with ablation in future.  - Discussed with Dr. Grissom, per his recommendations, will continue BB, add amiodarone 400 mg BID until cardioversion, and schedule patient for cardioversion for next week. Will also schedule for atrial flutter ablation.  to call to schedule. Call to notify patient of plan.  All question/concerns were answered.   - ER precautions discussed.     2. Congestive Heart Failure  - HFrEF, Stage C, Class II-III, LVEF 30%: decreased from 55% (08/07/18).  - Appears fluid overloaded, weight today 174 lbs, up 7 lbs.   - Reinforced s/sx of worsening heart failure with patient and weight monitoring. Pt verbalizes understanding. Pt to call office or RTC if present.   - On torsemide 10 mg, lisinopril, and toprol  - Labs  pending    3. Chronic Anticoagulation: Coumadin  - No signs/symptoms of bleeding.   - [id number] score: 5  - INR Goal: 2-3, last INR 3.4 (07/2/19).   - Continue OAC with Coumadin,   - Referral to anticoagulation clinic to monitor closely, continue.     4. ESRD  - On dialysis with [facility] MWF.  - Right AV fistula  - Followed by nephrology, continue.     5. CAD  - No angina  - On statin  - Consideration for repeat echocardiogram to reevaluate LV function once in SR, if continues to be depressed, consideration for ischemic workup.     Plan reviewed in detail with the patient and he verbalizes understanding and is in agreement. RTC 4 weeks, sooner if clinical condition changes.     Thank you for allowing me to participate in this patients care.  Please contact me with any questions or concerns.     DESIREE Jiang.   [facility]  [phone]    Collaborating MD/ADD: Dr. Tamra MD.           Martha Light M.D.  [address]  [address]  [place] [address]  VIA In Basket

## 2019-07-10 NOTE — TELEPHONE ENCOUNTER
Spoke with patient regarding previous messages. He is agreeable to appt with ED, this has been scheduled for today 7/10/19 at 1540.

## 2019-07-10 NOTE — PROGRESS NOTES
"Cardiology/Electrophysiology Follow-up Note    Subjective:   Chief Complaint:   Chief Complaint   Patient presents with   • Atrial Flutter     Parmjit Castelan is a 78 y.o. male who presents today for atrial flutter.      He is followed by  *** and was last seen by ELIDA Pandya on 06/25***/19 s/p hospitalization for atrial flutter.  Underwent successful cardioversion on 06/***/19. He was on doing well at that time and continued on his Toprol  mg daily.  He called this week stating his heart rates were 120-130s bpm.     Medical history significant for newly diagnosed atrial flutter on chronic anticoagulation with Coumadin, ESRD with right AV fistula and dialysis MWF, CAD (no interventions), CHF Hypertension, hyperlipidemia, PVD with chronic claudication***, BPH, HELGA on CPAP***?, COPD on 2.5L nasal cannula at night***, gout, moderate AS.    Today in follow up, he states his heart rates have been in the 120-130s bpm for the past two days associated with palpitations and dyspnea with exertion.  He denies chest pain, dizziness, pre syncope or syncope.  He reports fluid retension for the past 1-2 weeks with lower extremity edema, even post dialysis.  Denies and signs or symptoms of bleeding or bleeding issues. Patient endorses medication compliance.     Past Medical History:   Diagnosis Date   • Anesthesia     \"needs more sedation\" (can sometimes hear MD during procedure)    • Anticoagulation monitoring, special range    • Aortic stenosis     mod AS- concern for low flow severe AS with rEFof 30%   • Arterial leg ulcer (HCC) 11/8/2018   • Atrial flutter (HCC) 6/12/2019   • Basal cell carcinoma of left cheek 3/26/2015   • BPH 7/14/2009   • Bronchitis 12/25/2018   • CAD (coronary artery disease) 2/20/2014   • CATARACT     sanjuanita surgery complete   • CHF (congestive heart failure), NYHA class II, chronic, systolic ([facility]) E F30 in setting of atrial flutter 6/13/2019   • Chronic respiratory failure with hypoxia " ([facility]) 5/8/2016   • CKD (chronic kidney disease) stage 4, GFR 15-29 ml/min ([facility]) 1/15/2010    end stage renal disease   • COPD (chronic obstructive pulmonary disease) (Formerly [facility])     wears 2.5 L o2 sometimes when he sleeps   • Detached retina    • Dialysis     m,w,f [facility]   • EMPHYSEMA    • Gout    • Heart burn     occas   • Heart murmur     maria esther lee cardiologist   • High cholesterol    • Hypertension    • Indigestion     occas   • Kidney cyst    • Leg pain, bilateral 8/10/2015   • On supplemental oxygen therapy    • Peripheral vascular disease ([facility]) 8/10/2015   • Pneumonia    • Primary insomnia 3/22/2018   • Proteinuria    • PVD (peripheral vascular disease) ([facility])    • Sleep apnea     O2 PER CANULA  AT NIGHT 2l/m     Past Surgical History:   Procedure Laterality Date   • JUSTIN  6/13/2019    Procedure: ECHOCARDIOGRAM, TRANSESOPHAGEAL;  Surgeon: Harvinder Hoang M.D.;  Location: [facility];  Service: Cardiac   • CARDIOVERSION  6/13/2019    Procedure: CARDIOVERSION;  Surgeon: Harvinder Hoang M.D.;  Location: [facility];  Service: Cardiac   • AV FISTULA CREATION Right 2/21/2019    Procedure: AV FISTULA CREATION - ARM;  Surgeon: David Cason M.D.;  Location: [facility];  Service: General   • FEMORAL ENDARTERECTOMY Left 1/25/2019    Procedure: FEMORAL ENDARTERECTOMY;  Surgeon: David Cason M.D.;  Location: [facility];  Service: General   • FEMORAL POPLITEAL BYPASS Left 1/25/2019    Procedure: LEFT FEMORAL POPLITEAL POLYTETRAFLUOROETHYLENE ePTFE(PROPATEN VASCULAR GRAFT) BYPASS;  Surgeon: David Cason M.D.;  Location: [facility];  Service: General   • ANGIOGRAM Left 1/25/2019    Procedure: LEFT LEG ANGIOGRAM;  Surgeon: David Cason M.D.;  Location: [facility];  Service: General   • ENDOSCOPY PROCEDURE  3/21/2018    Procedure: ENDOSCOPY PROCEDURE/UPPER;  Surgeon: Enrique Child D.O.;  Location: [facility]  ORS;  Service: Gastroenterology   • COLONOSCOPY - ENDO  8/15/2016    Procedure: COLONOSCOPY - ENDO;  Surgeon: Mane Whatley M.D.;  Location: ENDOSCOPY [facility];  Service:    • GASTROSCOPY WITH BIOPSY  8/13/2016    Procedure: GASTROSCOPY WITH BIOPSY;  Surgeon: Jorge Leavitt M.D.;  Location: ENDOSCOPY [facility];  Service:    • RECOVERY  12/23/2015    Procedure: VASCULAR CASE-CASON-LEFT ARM FISTULOGRAM WITH ANGIOPLASTY;  Surgeon: Recoveryonly Surgery;  Location: SURGERY PRE-POST PROC UNIT [facility];  Service:    • RECOVERY  3/24/2015    Performed by Recoveryonly Surgery at SURGERY PRE-POST PROC UNIT [facility]   • RECOVERY  7/29/2014    Performed by Ir-Recovery Surgery at SURGERY SAME DAY [facility] ORS   • RECOVERY  3/24/2014    Performed by Ir-Recovery Surgery at SURGERY [facility]   • RECOVERY  12/17/2013    Performed by Ir-Recovery Surgery at SURGERY SAME DAY [facility] ORS   • RECOVERY  7/2/2013    Performed by Ir-Recovery Surgery at SURGERY SAME DAY [facility] ORS   • AV FISTULA THROMBOLYSIS  7/2/2013    Performed by David Cason M.D. at SURGERY [facility] ORS   • RECOVERY  1/29/2013    Performed by Ir-Recovery Surgery at SURGERY SAME DAY [facility] ORS   • RECOVERY  7/23/2012    Performed by SURGERY, IR-RECOVERY at SURGERY SAME DAY [facility] ORS   • VITRECTOMY POSTERIOR  10/11/2011    Performed by NAHUM GE at SURGERY SAME DAY [facility] ORS   • RECOVERY  8/12/2011    Performed by SURGERY, IR-RECOVERY at SURGERY SAME DAY [facility] ORS   • VITRECTOMY POSTERIOR  1/18/2011    Performed by NAHUM GE at SURGERY SAME DAY [facility] ORS   • SCLERAL BUCKLING  1/18/2011    Performed by NAHUM GE at SURGERY SAME DAY [facility] ORS   • AV FISTULOGRAM  9/17/2010    Performed by DAVID CASON at SURGERY [facility]   • ANGIOPLASTY BALLOON  9/17/2010    Performed by DAVID CASON at SURGERY [facility]   • AV FISTULOGRAM  7/23/2010    Performed by DAVID CASON at [facility] ORS   • ANGIOPLASTY BALLOON  7/23/2010    Performed by ALESHIA MOSCOSO at SURGERY [facility] ORS   • AV FISTULA REVISION  2/19/2010    Performed by WILLIE HATCH at SURGERY [facility] ORS   • AV FISTULA CREATION  2/12/2010    Performed by ALESHIA MOSCOSO at SURGERY [facility] ORS   • CATARACT PHACO WITH IOL  4/8/08    Performed by STACEY PHILLIPS at SURGERY SAME DAY [facility] ORS   • OTHER ORTHOPEDIC SURGERY  1998    right toe for facititis     Family History   Problem Relation Age of Onset   • Stroke Mother    • Hypertension Mother    • Lung Disease Father         Emphysema, resp failure   • Genitourinary () Maternal Aunt         hematuria   • Hypertension Brother      Social History     Social History   • Marital status:      Spouse name: N/A   • Number of children: N/A   • Years of education: N/A     Occupational History   • Not on file.     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 3 Tab 3   • amiodarone (CORDARONE) 200 MG Tab Take 2 Tabs by mouth every day. 60 Tab 2   • zolpidem (AMBIEN) 10 MG Tab Take 1 Tab by mouth every bedtime for 90 days. 90 Each 0   • torsemide (DEMADEX) 10 MG tablet Take 1 Tab by mouth every day. 30 Tab 3   • lisinopril (PRINIVIL) 5 MG Tab Take 1 Tab by mouth 2 Times a Day. 60 Tab 1   • warfarin (COUMADIN) 5 MG Tab Take 1 Tab by mouth every day. 30 Tab 3   • tamsulosin (FLOMAX) 0.4 MG  "capsule Take 0.8 mg by mouth every morning.     • omeprazole (PRILOSEC) 40 MG delayed-release capsule Take 40 mg by mouth every morning.     • traZODone (DESYREL) 150 MG Tab Take 300 mg by mouth every evening.     • calcium acetate (PHOS-LO) 667 MG Cap Take 2,001 mg by mouth 3 times a day, with meals. 2001mg three times a day with meals and 1334mg with snacks     • doxazosin (CARDURA) 4 MG Tab Take 4 mg by mouth every evening.     • pravastatin (PRAVACHOL) 20 MG Tab Take 20 mg by mouth every evening.     • Fluticasone-Umeclidin-Vilant (TRELEGY ELLIPTA) 100-62.5-25 MCG/INH AEROSOL POWDER, BREATH ACTIVATED Inhale 1 Puff by mouth every day. Rinse mouth after use 1 Each 11   • levalbuterol (XOPENEX) 1.25 MG/3ML Nebu Soln 3 mL by Nebulization route every four hours as needed for Shortness of Breath. 120 Bullet 11     No current facility-administered medications for this visit.        Review of Systems   Constitutional: Negative for chills, diaphoresis and fever.   Respiratory: Positive for shortness of breath (with exertion). Negative for cough and wheezing.    Cardiovascular: Positive for palpitations and leg swelling. Negative for chest pain, orthopnea, claudication and PND.   Gastrointestinal: Negative for abdominal pain and blood in stool.   Genitourinary: Negative for hematuria.   Neurological: Negative for dizziness and loss of consciousness.     All others systems reviewed and negative.   Objective:     /70 (BP Location: Left arm, Patient Position: Sitting, BP Cuff Size: Large adult)   Pulse 95   Ht 1.74 m (5' 8.5\")   Wt 79 kg (174 lb 3.2 oz)  Body mass index is 26.1 kg/m².    Physical Exam   Constitutional: He is oriented to person, place, and time. No distress.   HENT:   Head: Normocephalic.   Eyes: Pupils are equal, round, and reactive to light.   Neck: No JVD present.   Cardiovascular: Normal rate.  An irregularly irregular rhythm present.   No murmur heard.  Pulses:       Radial pulses are 2+ on the " right side, and 1+ on the left side.        Dorsalis pedis pulses are 1+ on the right side, and 1+ on the left side.   Pulmonary/Chest: Effort normal and breath sounds normal. No respiratory distress. He has no wheezes. He has no rales. He exhibits no tenderness.   Abdominal: Soft. Bowel sounds are normal.   Musculoskeletal: He exhibits edema (generalized 1-2+).   Neurological: He is alert and oriented to person, place, and time.   Skin: Skin is warm and dry. He is not diaphoretic. No erythema.   Psychiatric: Mood, memory, affect and judgment normal.   Nursing note and vitals reviewed.    Cardiac Imaging and Procedures Review:    EKG 07/10/2019: Atrial Flutter, 3:1 block, RBBB    Echocardiogram (06/13/2019):   No thrombus detected in the left atrial appendage or other atrial   structures.  Moderately reduced left ventricular systolic function.  Left ventricular ejection fraction is visually estimated to be 30%.  Global hypokinesis.  Diastolic function is difficult to assess with atrial fibrillation.  Reduced right ventricular systolic function.  Mild mitral regurgitation.  Moderate aortic stenosis.  Mild aortic insufficiency.  Moderate pericardial effusion WITHOUT evidence of hemodynamic   compromise.  Compared to the images of the transthoracic echocardiogram dated   8/7/2018, the ejection fraction is further reduced previously low   normal.  It is difficult to compare the size of the pericardial   effusion due to technique but it was previously large.    Labs (personally reviewed and notable for):   Lab Results   Component Value Date/Time    SODIUM 138 06/28/2019 12:45 PM    POTASSIUM 5.1 06/28/2019 12:45 PM    CHLORIDE 96 06/28/2019 12:45 PM    CO2 23 06/28/2019 12:45 PM    GLUCOSE 111 (H) 06/28/2019 12:45 PM    BUN 86 (HH) 06/28/2019 12:45 PM    CREATININE 10.82 (HH) 06/28/2019 12:45 PM    CREATININE 2.1 (H) 05/06/2009 10:08 AM      Lab Results   Component Value Date/Time    WBC 3.3 (L) 06/28/2019 12:45 PM     RBC 2.97 (L) 06/28/2019 12:45 PM    HEMOGLOBIN 9.5 (L) 06/28/2019 12:45 PM    HEMATOCRIT 30.3 (L) 06/28/2019 12:45 PM    .0 (H) 06/28/2019 12:45 PM    MCH 32.0 06/28/2019 12:45 PM    MCHC 31.4 (L) 06/28/2019 12:45 PM    MPV 9.5 06/28/2019 12:45 PM    NEUTSPOLYS 75.40 (H) 06/28/2019 12:45 PM    LYMPHOCYTES 10.00 (L) 06/28/2019 12:45 PM    MONOCYTES 11.90 06/28/2019 12:45 PM    EOSINOPHILS 2.10 06/28/2019 12:45 PM    BASOPHILS 0.60 06/28/2019 12:45 PM      PT/INR:   Lab Results   Component Value Date/Time    PROTHROMBTM 35.9 (H) 07/01/2019 12:00 AM    PROTHROMBTM 32.1 (H) 06/28/2019 12:45 PM    INR 3.40 07/02/2019   ]  Assessment:     1. Atrial flutter, unspecified type (HCC)  EKG    REFERRAL TO ANTICOAGULATION MONITORING    CL-CARDIOVERSION   2. ESRD (end stage renal disease) on dialysis (HCC)     3. HELGA (obstructive sleep apnea)     4. Chronic anticoagulation  REFERRAL TO ANTICOAGULATION MONITORING   5. Essential hypertension       Medical Decision Making:  Today's Assessment / Status / Plan:   1. Atrial Flutter  - Mildly symptomatic, palpitations, ARIAS.  - Hx of cardioversion (last DCCV 06/13/19),   - Per echo (06/13/19) Moderately reduced LV function, Global hypokinesis, EF 30%, mild MR, moderate AS, mild AI, moderate pericardial effusion without hemodynamic compromise.   - EKG today showed typical atrial flutter with 3:1, no acute findings.   - On OAC with Coumadin, continue.  - On metoprolol 150 mg daily. Patient encouraged to monitor BP and call office if Hrs remain elevated.   - Discussed options including rate control, addition of an antiarrhythmic, and ablation. Patient wishes to proceed with ablation in future.  - Discussed with Dr. Grissom, per his recommendations, will continue BB, add amiodarone 200 mg BID until cardioversion, and schedule patient for cardioversion for next week. Will also schedule for atrial flutter ablation.     2. Congestive Heart Failure  - HFrEF, Stage C, Class II-III, LVEF 30%:  decreased from 55% (08/07/18).  - Appears fluid overloaded, weight today 174 lbs, up 7 lbs.   - Reinforced s/sx of worsening heart failure with patient and weight monitoring. Pt verbalizes understanding. Pt to call office or RTC if present.   - On torsemide 10 mg, lisinopril, and toprol  - Labs pending    3. Chronic Anticoagulation: Coumadin  - No signs/symptoms of bleeding.   - [id number] score: 5  - INR Goal: 2-3, last INR 3.4 (07/2/19).   - Continue OAC with Coumadin,   - Referral to anticoagulation clinic to monitor closely, continue.     4. ESRD  - On dialysis with [facility] MWF.  - Right AV fistula  - Followed by nephrology, continue.     5. CAD  - No angina  - On statin  - Consideration for repeat echocardiogram to reevaluate LV function once in SR, if continues to be depressed, consideration for ischemic workup.     Plan reviewed in detail with the patient and he verbalizes understanding and is in agreement. RTC 4 weeks, sooner if clinical condition changes.     Thank you for allowing me to participate in this patients care.  Please contact me with any questions or concerns.     DESIREE Jiang.   [facility]  [phone]    Collaborating MD/ADD: Dr. Tamra MD.

## 2019-07-11 ENCOUNTER — TELEPHONE (OUTPATIENT)
Dept: CARDIOLOGY | Facility: MEDICAL CENTER | Age: 79
End: 2019-07-11

## 2019-07-11 DIAGNOSIS — I50.22 CHRONIC SYSTOLIC HEART FAILURE (HCC): ICD-10-CM

## 2019-07-11 DIAGNOSIS — I48.3 TYPICAL ATRIAL FLUTTER (HCC): ICD-10-CM

## 2019-07-11 LAB — EKG IMPRESSION: NORMAL

## 2019-07-11 RX ORDER — TORSEMIDE 10 MG/1
30 TABLET ORAL DAILY
Qty: 30 TAB | Refills: 3 | Status: ON HOLD | OUTPATIENT
Start: 2019-07-11 | End: 2019-08-11

## 2019-07-11 NOTE — PROGRESS NOTES
"Cardiology/Electrophysiology Follow-up Note    Subjective:   Chief Complaint:   Chief Complaint   Patient presents with   • Atrial Flutter     Parmjit Castelan is a 78 y.o. male who presents today for atrial flutter.      He was last seen by ELIDA Pandya on 06/25/19 s/p hospitalization for atrial flutter. Underwent cardioversion on 06/13/19, JUSTIN negative for clot. He was on doing well at that time and continued on his Toprol  mg daily.  He called this week stating his heart rates were 120-130s bpm.     Medical history significant for newly diagnosed atrial flutter on chronic anticoagulation with Coumadin, ESRD with right AV fistula and dialysis MWF, CAD (no interventions), CHF Hypertension, hyperlipidemia, PVD with chronic claudication, BPH, HELGA- not on CPAP, COPD on 2.5L nasal cannula as needed, gout, and moderate AS.    Today in follow up, he states his heart rates have been in the 120-130s bpm for the past two days associated with palpitations and dyspnea with exertion.  He denies chest pain, dizziness, pre syncope or syncope.  He reports fluid retension for the past 1-2 weeks with lower extremity edema, even post dialysis.  Denies and signs or symptoms of bleeding or bleeding issues. Patient endorses medication compliance.     Past Medical History:   Diagnosis Date   • Anesthesia     \"needs more sedation\" (can sometimes hear MD during procedure)    • Anticoagulation monitoring, special range    • Aortic stenosis     mod AS- concern for low flow severe AS with rEFof 30%   • Arterial leg ulcer ([facility]) 11/8/2018   • Atrial flutter ([facility]) 6/12/2019   • Basal cell carcinoma of left cheek 3/26/2015   • BPH 7/14/2009   • Bronchitis 12/25/2018   • CAD (coronary artery disease) 2/20/2014   • CATARACT     sanjuanita surgery complete   • CHF (congestive heart failure), NYHA class II, chronic, systolic ([facility]) E F30 in setting of atrial flutter 6/13/2019   • Chronic respiratory failure with hypoxia ([facility]) 5/8/2016   • CKD " (chronic kidney disease) stage 4, GFR 15-29 ml/min ([facility]) 1/15/2010    end stage renal disease   • COPD (chronic obstructive pulmonary disease) ([facility])     wears 2.5 L o2 sometimes when he sleeps   • Detached retina    • Dialysis     m,w,f [facility]   • EMPHYSEMA    • Gout    • Heart burn     occas   • Heart murmur     maria esther lee cardiologist   • High cholesterol    • Hypertension    • Indigestion     occas   • Kidney cyst    • Leg pain, bilateral 8/10/2015   • On supplemental oxygen therapy    • Peripheral vascular disease ([facility]) 8/10/2015   • Pneumonia    • Primary insomnia 3/22/2018   • Proteinuria    • PVD (peripheral vascular disease) ([facility])    • Sleep apnea     O2 PER CANULA  AT NIGHT 2l/m     Past Surgical History:   Procedure Laterality Date   • JUSTIN  6/13/2019    Procedure: ECHOCARDIOGRAM, TRANSESOPHAGEAL;  Surgeon: Harvinder Hoang M.D.;  Location: [facility];  Service: Cardiac   • CARDIOVERSION  6/13/2019    Procedure: CARDIOVERSION;  Surgeon: Harvinder Hoang M.D.;  Location: [facility];  Service: Cardiac   • AV FISTULA CREATION Right 2/21/2019    Procedure: AV FISTULA CREATION - ARM;  Surgeon: David Cason M.D.;  Location: [facility];  Service: General   • FEMORAL ENDARTERECTOMY Left 1/25/2019    Procedure: FEMORAL ENDARTERECTOMY;  Surgeon: David Cason M.D.;  Location: [facility];  Service: General   • FEMORAL POPLITEAL BYPASS Left 1/25/2019    Procedure: LEFT FEMORAL POPLITEAL POLYTETRAFLUOROETHYLENE ePTFE(PROPATEN VASCULAR GRAFT) BYPASS;  Surgeon: David Cason M.D.;  Location: [facility];  Service: General   • ANGIOGRAM Left 1/25/2019    Procedure: LEFT LEG ANGIOGRAM;  Surgeon: David Cason M.D.;  Location: [facility];  Service: General   • ENDOSCOPY PROCEDURE  3/21/2018    Procedure: ENDOSCOPY PROCEDURE/UPPER;  Surgeon: Enrique Child D.O.;  Location: [facility];  Service:  Gastroenterology   • COLONOSCOPY - ENDO  8/15/2016    Procedure: COLONOSCOPY - ENDO;  Surgeon: Mane Whatley M.D.;  Location: ENDOSCOPY [facility];  Service:    • GASTROSCOPY WITH BIOPSY  8/13/2016    Procedure: GASTROSCOPY WITH BIOPSY;  Surgeon: Jorge Leavitt M.D.;  Location: ENDOSCOPY [facility];  Service:    • RECOVERY  12/23/2015    Procedure: VASCULAR CASE-CASON-LEFT ARM FISTULOGRAM WITH ANGIOPLASTY;  Surgeon: Recoveryonly Surgery;  [place] [facility] [facility]  Service:    • RECOVERY  3/24/2015    Performed by Recovery[facility] Surgery at [facility] [place] [facility]   • RECOVERY  7/29/2014    Performed by Ir-Recovery Surgery at SURGERY SAME DAY [facility]   • RECOVERY  3/24/2014    Performed by Ir[facility]Recovery Surgery at SURGERY [facility]   • RECOVERY  12/17/2013    Performed by Ir-Recovery Surgery at SURGERY SAME DAY [facility]   • RECOVERY  7/2/2013    Performed by Ir-Recovery Surgery at SURGERY SAME DAY [facility]   • AV FISTULA THROMBOLYSIS  7/2/2013    Performed by David Cason M.D. at SURGERY [facility]   • RECOVERY  1/29/2013    Performed by Ir-Recovery Surgery at SURGERY SAME DAY [facility]   • RECOVERY  7/23/2012    Performed by SURGERY, IR-RECOVERY at SURGERY SAME DAY [facility]   • VITRECTOMY POSTERIOR  10/11/2011    Performed by NAHUM GE at SURGERY SAME DAY [facility]   • RECOVERY  8/12/2011    Performed by SURGERY, IR-RECOVERY at SURGERY SAME DAY [facility]   • VITRECTOMY POSTERIOR  1/18/2011    Performed by NAHUM GE at SURGERY SAME DAY [facility]   • SCLERAL BUCKLING  1/18/2011    Performed by NAHUM GE at SURGERY SAME DAY [facility]   • AV FISTULOGRAM  9/17/2010    Performed by DAVID CASON at SURGERY [facility]   • ANGIOPLASTY BALLOON  9/17/2010    Performed by DAVID CASON at SURGERY [facility]   • AV FISTULOGRAM  7/23/2010    Performed by DAVID CASON at SURGERY [facility]   •  ANGIOPLASTY BALLOON  7/23/2010    Performed by ALESHIA MOSCOSO at SURGERY [facility]   • AV FISTULA REVISION  2/19/2010    Performed by WILLIE HATCH at SURGERY [facility]   • AV FISTULA CREATION  2/12/2010    Performed by ALESHIA MOSCOSO at SURGERY [facility]   • CATARACT PHACO WITH IOL  4/8/08    Performed by STACEY PHILLIPS at SURGERY SAME DAY [facility]   • OTHER ORTHOPEDIC SURGERY  1998    right toe for facititis     Family History   Problem Relation Age of Onset   • Stroke Mother    • Hypertension Mother    • Lung Disease Father         Emphysema, resp failure   • Genitourinary () Maternal Aunt         hematuria   • Hypertension Brother      Social History     Social History   • Marital status:      Spouse name: N/A   • Number of children: N/A   • Years of education: N/A     Occupational History   • Not on file.     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 60 Tab 1   • metoprolol SR (TOPROL XL) 100 MG TABLET SR 24 HR Take 1.5 Tabs by mouth every day. 45 Tab 3   • zolpidem (AMBIEN) 10 MG Tab Take 1 Tab by mouth every bedtime for 90 days. 90 Each 0   • torsemide (DEMADEX) 10 MG tablet Take 1 Tab by mouth every day. 30 Tab 3   • lisinopril (PRINIVIL) 5 MG Tab Take 1 Tab by mouth 2 Times a Day. 60 Tab 1   • warfarin (COUMADIN) 5 MG Tab Take 1 Tab by mouth every day. 30 Tab 3   • tamsulosin (FLOMAX) 0.4 MG capsule  "Take 0.8 mg by mouth every morning.     • omeprazole (PRILOSEC) 40 MG delayed-release capsule Take 40 mg by mouth every morning.     • traZODone (DESYREL) 150 MG Tab Take 300 mg by mouth every evening.     • calcium acetate (PHOS-LO) 667 MG Cap Take 2,001 mg by mouth 3 times a day, with meals. 2001mg three times a day with meals and 1334mg with snacks     • doxazosin (CARDURA) 4 MG Tab Take 4 mg by mouth every evening.     • pravastatin (PRAVACHOL) 20 MG Tab Take 20 mg by mouth every evening.     • Fluticasone-Umeclidin-Vilant (TRELEGY ELLIPTA) 100-62.5-25 MCG/INH AEROSOL POWDER, BREATH ACTIVATED Inhale 1 Puff by mouth every day. Rinse mouth after use 1 Each 11   • levalbuterol (XOPENEX) 1.25 MG/3ML Nebu Soln 3 mL by Nebulization route every four hours as needed for Shortness of Breath. 120 Bullet 11     No current facility-administered medications for this visit.      Review of Systems   Constitutional: Negative for chills, diaphoresis and fever.   Respiratory: Positive for shortness of breath (with exertion). Negative for cough and wheezing.    Cardiovascular: Positive for palpitations and leg swelling. Negative for chest pain, orthopnea, claudication and PND.   Gastrointestinal: Negative for abdominal pain and blood in stool.   Genitourinary: Negative for hematuria.   Neurological: Negative for dizziness and loss of consciousness.     All others systems reviewed and negative.   Objective:     /70 (BP Location: Left arm, Patient Position: Sitting, BP Cuff Size: Large adult)   Pulse 95   Ht 1.74 m (5' 8.5\")   Wt 79 kg (174 lb 3.2 oz)  Body mass index is 26.1 kg/m².    Physical Exam   Constitutional: He is oriented to person, place, and time. No distress.   HENT:   Head: Normocephalic.   Eyes: Pupils are equal, round, and reactive to light.   Neck: No JVD present.   Cardiovascular: Normal rate.  An irregularly irregular rhythm present.   No murmur heard.  Pulses:       Radial pulses are 2+ on the right " side, and 1+ on the left side.        Dorsalis pedis pulses are 1+ on the right side, and 1+ on the left side.   Pulmonary/Chest: Effort normal and breath sounds normal. No respiratory distress. He has no wheezes. He has no rales. He exhibits no tenderness.   Abdominal: Soft. Bowel sounds are normal.   Musculoskeletal: He exhibits edema (generalized 1-2+).   Neurological: He is alert and oriented to person, place, and time.   Skin: Skin is warm and dry. He is not diaphoretic. No erythema.   Psychiatric: Mood, memory, affect and judgment normal.   Nursing note and vitals reviewed.    Cardiac Imaging and Procedures Review:    EKG 07/10/2019: Atrial Flutter, 3:1 block, RBBB    Echocardiogram (06/13/2019):   No thrombus detected in the left atrial appendage or other atrial   structures.  Moderately reduced left ventricular systolic function.  Left ventricular ejection fraction is visually estimated to be 30%.  Global hypokinesis.  Diastolic function is difficult to assess with atrial fibrillation.  Reduced right ventricular systolic function.  Mild mitral regurgitation.  Moderate aortic stenosis.  Mild aortic insufficiency.  Moderate pericardial effusion WITHOUT evidence of hemodynamic   compromise.  Compared to the images of the transthoracic echocardiogram dated   8/7/2018, the ejection fraction is further reduced previously low   normal.  It is difficult to compare the size of the pericardial   effusion due to technique but it was previously large.    Labs (personally reviewed and notable for):   Lab Results   Component Value Date/Time    SODIUM 138 06/28/2019 12:45 PM    POTASSIUM 5.1 06/28/2019 12:45 PM    CHLORIDE 96 06/28/2019 12:45 PM    CO2 23 06/28/2019 12:45 PM    GLUCOSE 111 (H) 06/28/2019 12:45 PM    BUN 86 (HH) 06/28/2019 12:45 PM    CREATININE 10.82 (HH) 06/28/2019 12:45 PM    CREATININE 2.1 (H) 05/06/2009 10:08 AM      Lab Results   Component Value Date/Time    WBC 3.3 (L) 06/28/2019 12:45 PM    RBC 2.97  (L) 06/28/2019 12:45 PM    HEMOGLOBIN 9.5 (L) 06/28/2019 12:45 PM    HEMATOCRIT 30.3 (L) 06/28/2019 12:45 PM    .0 (H) 06/28/2019 12:45 PM    MCH 32.0 06/28/2019 12:45 PM    MCHC 31.4 (L) 06/28/2019 12:45 PM    MPV 9.5 06/28/2019 12:45 PM    NEUTSPOLYS 75.40 (H) 06/28/2019 12:45 PM    LYMPHOCYTES 10.00 (L) 06/28/2019 12:45 PM    MONOCYTES 11.90 06/28/2019 12:45 PM    EOSINOPHILS 2.10 06/28/2019 12:45 PM    BASOPHILS 0.60 06/28/2019 12:45 PM      PT/INR:   Lab Results   Component Value Date/Time    PROTHROMBTM 35.9 (H) 07/01/2019 12:00 AM    PROTHROMBTM 32.1 (H) 06/28/2019 12:45 PM    INR 3.40 07/02/2019   ]  Assessment:     1. Atrial flutter, unspecified type (HCC)  EKG    REFERRAL TO ANTICOAGULATION MONITORING    CL-CARDIOVERSION   2. ESRD (end stage renal disease) on dialysis (HCC)     3. HELGA (obstructive sleep apnea)     4. Chronic anticoagulation  REFERRAL TO ANTICOAGULATION MONITORING   5. Essential hypertension       Medical Decision Making:  Today's Assessment / Status / Plan:   1. Atrial Flutter  - Mildly symptomatic, palpitations, ARIAS.  - Hx of cardioversion (last DCCV 06/13/19),   - Per echo (06/13/19) Moderately reduced LV function, Global hypokinesis, EF 30%, mild MR, moderate AS, mild AI, moderate pericardial effusion without hemodynamic compromise.   - EKG today showed typical atrial flutter with 3:1, no acute findings.   - On OAC with Coumadin, continue.  - On metoprolol 150 mg daily. Patient encouraged to monitor BP and call office if Hrs remain elevated.   - Discussed options including rate control, addition of an antiarrhythmic, and ablation. Patient wishes to proceed with ablation in future.  - Discussed with Dr. Grissom, per his recommendations, will continue BB, add amiodarone 400 mg BID until cardioversion, and schedule patient for cardioversion for next week. Will also schedule for atrial flutter ablation.  to call to schedule. Call to notify patient of plan.  All  question/concerns were answered.   - ER precautions discussed.     2. Congestive Heart Failure  - HFrEF, Stage C, Class II-III, LVEF 30%: decreased from 55% (08/07/18).  - Appears fluid overloaded, weight today 174 lbs, up 7 lbs.   - Reinforced s/sx of worsening heart failure with patient and weight monitoring. Pt verbalizes understanding. Pt to call office or RTC if present.   - On torsemide 10 mg, lisinopril, and toprol  - Labs pending    3. Chronic Anticoagulation: Coumadin  - No signs/symptoms of bleeding.   - [id number] score: 5  - INR Goal: 2-3, last INR 3.4 (07/2/19).   - Continue OAC with Coumadin,   - Referral to anticoagulation clinic to monitor closely, continue.     4. ESRD  - On dialysis with [facility] MWF.  - Right AV fistula  - Followed by nephrology, continue.     5. CAD  - No angina  - On statin  - Consideration for repeat echocardiogram to reevaluate LV function once in SR, if continues to be depressed, consideration for ischemic workup.     Plan reviewed in detail with the patient and he verbalizes understanding and is in agreement. RTC 4 weeks, sooner if clinical condition changes.     Thank you for allowing me to participate in this patients care.  Please contact me with any questions or concerns.     DESIREE Jiang.   [facility]  [phone]    Collaborating MD/ADD: Dr. Tamra MD.

## 2019-07-11 NOTE — TELEPHONE ENCOUNTER
Cleo Arechiga, Med Ass't  Deidre Sargent, R.N.   Phone Number: [phone]             ED     Pt calling about today's call re: amiodarone. He is requesting a call back at [phone]      Spoke with patient regarding above. Advised that this is the reason there was a referral placed to the anticoagulation clinic for closer monitoring. Also advised of ED recommendations for torsemide, he verbalized understanding. Transferred to scheduling for cardioversion and ablation.

## 2019-07-11 NOTE — PROGRESS NOTES
Patient appeared fluid overloaded yesterday 07/10/19 at office visit and is up 16+ lbs from dry weight.  Previously on Lasix 80 mg BID and was changed to torsemide 10 mg daily with continued fluid retention.     Will increase his torsemide to 30 mg BID and continue to monitor with repeat labs in 1 week.   Will call patient to notify and reinforce s/sx of worsening heart failure with patient and weight monitoring. Pt to call office or RTC if present.       Thanks,     DESIREE Jiang.   [facility]  [phone]

## 2019-07-11 NOTE — TELEPHONE ENCOUNTER
Juan Jones,     Please call the patient and letting him know since his weight is up and he is retaining fluid, I increased his torsemide to 30 mg BID and continue to monitor with repeat labs in 1 week. I have already placed orders and sent Rx to pharmacy.      Reinforce s/sx of worsening heart failure with patient and weight monitoring. Pt to call office or RTC if present.     Thanks,     ILAN Jiang   [facility]  [phone]

## 2019-07-12 ENCOUNTER — TELEPHONE (OUTPATIENT)
Dept: CARDIOLOGY | Facility: MEDICAL CENTER | Age: 79
End: 2019-07-12

## 2019-07-12 ENCOUNTER — ANTICOAGULATION MONITORING (OUTPATIENT)
Dept: VASCULAR LAB | Facility: MEDICAL CENTER | Age: 79
End: 2019-07-12

## 2019-07-12 LAB
INR PPP: 2 (ref 0.8–1.2)
PROTHROMBIN TIME: 20.7 SEC (ref 9.1–12)

## 2019-07-12 NOTE — TELEPHONE ENCOUNTER
Spoke with Alejandra at [phone]. 7-11-19 INR=2.0. 7-2-19 INR=3.4.   Explained to Alejandra that pt's. INR should be >2.0 on Mon. When he has his cardioversion with Dr. Grissom. Alejandra will have pharmacist call pt. Today To adjust Warfarin dose .  FYI to Dr. Grissom.

## 2019-07-12 NOTE — PROGRESS NOTES
Anticoagulation Summary  As of 2019    INR goal:   2.0-3.0   TTR:   56.6 % (2 wk)   INR used for dosin.0 (2019)   Warfarin maintenance plan:   5 mg (5 mg x 1) every day   Weekly warfarin total:   35 mg   Plan last modified:   Gaudencio Page PharmD (2019)   Next INR check:   7/15/2019   Target end date:   Indefinite    Indications    Atrial flutter (HCC) (Resolved) [I48.92]             Anticoagulation Episode Summary     INR check location:   [facility]    Preferred lab:       Send INR reminders to:       Comments:   [facility] [phone]      Anticoagulation Care Providers     Provider Role Specialty Phone number    [facility]   [phone]    Martha Light M.D.  Family Medicine [phone]        Anticoagulation Patient Findings     HPI:  Parmjit Castelan, on anticoagulation therapy with warfarin for AF with upcoming cardioversion on 7/15/19.  Changes to current medical/health status since last appt: none  Denies signs/symptoms of bleeding and/or thrombosis since the last appt.    Denies any interval changes to diet  Pt might start amiodarone, but is uncertain.    Denies any complications or cost restrictions with current therapy.     A/P   INR  therapeutic.   Given pt might start amiodarone, likely shouldn't increase warfarin dose today.  INR needs to be >2 for upcoming cardioversion.  With the dosing history we have on file 5 mg daily with or without amiodarone is likely the best dose for this patient.     Next INR in 3 days with dialysis.     Masood Hoffman, PharmD     This note was faxed to [facility] as an order for the next INR.

## 2019-07-12 NOTE — TELEPHONE ENCOUNTER
Called Gentry Lucero for INR DRAWN 7-11-19. nURSE WAS THEN DIRECTED BY Norm TO CALL [phone] FOR inr RESULTS. lEFT MESSAGE AT THAT NUMBER FOR CALL RAYSA RE: inr.

## 2019-07-12 NOTE — TELEPHONE ENCOUNTER
Patient scheduled for cardioversion on 7-15-19 at [facility] with Dr. Grissom.

## 2019-07-12 NOTE — TELEPHONE ENCOUNTER
Dr. Grissom,    I'm going to schedule this patient for a flutter ablation w/JUSTIN and w/anesthesia. Are there any medications you would like this patient to hold for this procedure?    Thank You,  Marie

## 2019-07-15 ENCOUNTER — HOSPITAL ENCOUNTER (OUTPATIENT)
Facility: MEDICAL CENTER | Age: 79
End: 2019-07-15
Attending: INTERNAL MEDICINE | Admitting: INTERNAL MEDICINE
Payer: MEDICARE

## 2019-07-15 ENCOUNTER — APPOINTMENT (OUTPATIENT)
Dept: CARDIOLOGY | Facility: MEDICAL CENTER | Age: 79
End: 2019-07-15
Attending: NURSE PRACTITIONER
Payer: MEDICARE

## 2019-07-15 ENCOUNTER — APPOINTMENT (OUTPATIENT)
Dept: LAB | Facility: MEDICAL CENTER | Age: 79
End: 2019-07-15
Attending: INTERNAL MEDICINE
Payer: MEDICARE

## 2019-07-15 VITALS
HEART RATE: 75 BPM | OXYGEN SATURATION: 98 % | SYSTOLIC BLOOD PRESSURE: 128 MMHG | BODY MASS INDEX: 26.26 KG/M2 | WEIGHT: 173.28 LBS | DIASTOLIC BLOOD PRESSURE: 62 MMHG | HEIGHT: 68 IN | TEMPERATURE: 97.8 F | RESPIRATION RATE: 18 BRPM

## 2019-07-15 DIAGNOSIS — I48.92 ATRIAL FLUTTER, UNSPECIFIED TYPE (HCC): ICD-10-CM

## 2019-07-15 LAB
BASOPHILS # BLD AUTO: 0.5 % (ref 0–1.8)
BASOPHILS # BLD: 0.02 K/UL (ref 0–0.12)
EKG IMPRESSION: NORMAL
EOSINOPHIL # BLD AUTO: 0.05 K/UL (ref 0–0.51)
EOSINOPHIL NFR BLD: 1.1 % (ref 0–6.9)
ERYTHROCYTE [DISTWIDTH] IN BLOOD BY AUTOMATED COUNT: 57.5 FL (ref 35.9–50)
HCT VFR BLD AUTO: 29.5 % (ref 42–52)
HGB BLD-MCNC: 8.9 G/DL (ref 14–18)
IMM GRANULOCYTES # BLD AUTO: 0.01 K/UL (ref 0–0.11)
IMM GRANULOCYTES NFR BLD AUTO: 0.2 % (ref 0–0.9)
INR PPP: 1.6 (ref 2–3.5)
LYMPHOCYTES # BLD AUTO: 0.39 K/UL (ref 1–4.8)
LYMPHOCYTES NFR BLD: 9 % (ref 22–41)
MCH RBC QN AUTO: 31.3 PG (ref 27–33)
MCHC RBC AUTO-ENTMCNC: 30.2 G/DL (ref 33.7–35.3)
MCV RBC AUTO: 103.9 FL (ref 81.4–97.8)
MONOCYTES # BLD AUTO: 0.42 K/UL (ref 0–0.85)
MONOCYTES NFR BLD AUTO: 9.7 % (ref 0–13.4)
NEUTROPHILS # BLD AUTO: 3.46 K/UL (ref 1.82–7.42)
NEUTROPHILS NFR BLD: 79.5 % (ref 44–72)
NRBC # BLD AUTO: 0 K/UL
NRBC BLD-RTO: 0 /100 WBC
NT-PROBNP SERPL IA-MCNC: ABNORMAL PG/ML (ref 0–125)
PLATELET # BLD AUTO: 192 K/UL (ref 164–446)
PMV BLD AUTO: 10.3 FL (ref 9–12.9)
RBC # BLD AUTO: 2.84 M/UL (ref 4.7–6.1)
WBC # BLD AUTO: 4.4 K/UL (ref 4.8–10.8)

## 2019-07-15 PROCEDURE — 85025 COMPLETE CBC W/AUTO DIFF WBC: CPT

## 2019-07-15 PROCEDURE — 84439 ASSAY OF FREE THYROXINE: CPT

## 2019-07-15 PROCEDURE — 83735 ASSAY OF MAGNESIUM: CPT

## 2019-07-15 PROCEDURE — 84443 ASSAY THYROID STIM HORMONE: CPT

## 2019-07-15 PROCEDURE — 36415 COLL VENOUS BLD VENIPUNCTURE: CPT

## 2019-07-15 PROCEDURE — 83880 ASSAY OF NATRIURETIC PEPTIDE: CPT

## 2019-07-15 PROCEDURE — 80053 COMPREHEN METABOLIC PANEL: CPT

## 2019-07-15 PROCEDURE — 93010 ELECTROCARDIOGRAM REPORT: CPT | Performed by: INTERNAL MEDICINE

## 2019-07-15 PROCEDURE — 93005 ELECTROCARDIOGRAM TRACING: CPT | Performed by: INTERNAL MEDICINE

## 2019-07-15 RX ORDER — FUROSEMIDE 20 MG/1
20 TABLET ORAL DAILY
COMMUNITY
End: 2019-07-26

## 2019-07-15 NOTE — TELEPHONE ENCOUNTER
Karon Grissom M.D.  Marie Cutler, Med Ass't   Caller: Unspecified (3 days ago,  9:53 AM)             No meds to hold

## 2019-07-15 NOTE — TELEPHONE ENCOUNTER
Patient scheduled for flutter ablation w/JUSTIN on 7-29-19 at [facility] with Dr. Grissom. Patient refused to schedule an appointment for pre admission. I did explain to the patient that if he declines to pre admit then if his blood work comes back abnormal the morning of the procedure, we will have to cancel and address the blood work at that time. Patient stated that he understood.

## 2019-07-15 NOTE — OR NURSING
Procedure cancelled 2/2 sinus rhythm on EKG. Dr. Grissom to come to bedside to answer questions before discharge.

## 2019-07-16 ENCOUNTER — TELEPHONE (OUTPATIENT)
Dept: CARDIOLOGY | Facility: MEDICAL CENTER | Age: 79
End: 2019-07-16

## 2019-07-16 ENCOUNTER — ANTICOAGULATION MONITORING (OUTPATIENT)
Dept: VASCULAR LAB | Facility: MEDICAL CENTER | Age: 79
End: 2019-07-16

## 2019-07-16 LAB
ALBUMIN SERPL BCP-MCNC: 3.6 G/DL (ref 3.2–4.9)
ALBUMIN/GLOB SERPL: 1.2 G/DL
ALP SERPL-CCNC: 142 U/L (ref 30–99)
ALT SERPL-CCNC: 19 U/L (ref 2–50)
ANION GAP SERPL CALC-SCNC: 18 MMOL/L (ref 0–11.9)
AST SERPL-CCNC: 9 U/L (ref 12–45)
BILIRUB SERPL-MCNC: 0.6 MG/DL (ref 0.1–1.5)
BUN SERPL-MCNC: 102 MG/DL (ref 8–22)
CALCIUM SERPL-MCNC: 9.1 MG/DL (ref 8.5–10.5)
CHLORIDE SERPL-SCNC: 99 MMOL/L (ref 96–112)
CO2 SERPL-SCNC: 19 MMOL/L (ref 20–33)
CREAT SERPL-MCNC: 12.21 MG/DL (ref 0.5–1.4)
GLOBULIN SER CALC-MCNC: 3.1 G/DL (ref 1.9–3.5)
GLUCOSE SERPL-MCNC: 99 MG/DL (ref 65–99)
MAGNESIUM SERPL-MCNC: 1.9 MG/DL (ref 1.5–2.5)
POTASSIUM SERPL-SCNC: 6.3 MMOL/L (ref 3.6–5.5)
PROT SERPL-MCNC: 6.7 G/DL (ref 6–8.2)
SODIUM SERPL-SCNC: 136 MMOL/L (ref 135–145)
T4 FREE SERPL-MCNC: 0.91 NG/DL (ref 0.53–1.43)
TSH SERPL DL<=0.005 MIU/L-ACNC: 1.82 UIU/ML (ref 0.38–5.33)

## 2019-07-16 NOTE — PROGRESS NOTES
Anticoagulation Summary  As of 2019    INR goal:   2.0-3.0   TTR:   44.0 % (2.6 wk)   INR used for dosin.60! (7/15/2019)   Warfarin maintenance plan:   5 mg (5 mg x 1) every day   Weekly warfarin total:   35 mg   Plan last modified:   Danae EstebanD (2019)   Next INR check:   2019   Target end date:   Indefinite    Indications    Atrial flutter (HCC) (Resolved) [I48.92]             Anticoagulation Episode Summary     INR check location:   [facility]    Preferred lab:       Send INR reminders to:       Comments:   [facility] [phone]      Anticoagulation Care Providers     Provider Role Specialty Phone number    [facility]   [phone]    Martha Light M.D.  Family Medicine [phone]        Anticoagulation Patient Findings      INR  sub-therapeutic.   Left a voice message for the patient, asked patient to please call the anticoagulation clinic if they have any signs/symptoms of bleeding and/or thrombosis or any changes to diet or medications.    Follow up appointment in 1 week(s)    7.5mg today then continue weekly warfarin dose as noted    Faxed as noted above       Gaudencio Page, DanaeD

## 2019-07-16 NOTE — TELEPHONE ENCOUNTER
Received call from lab this morning for critical results for labs SC ordered, Creat 12.21 abd . Awaiting the release of the labs. SC updated.

## 2019-07-17 DIAGNOSIS — I48.92 ATRIAL FLUTTER, UNSPECIFIED TYPE (HCC): ICD-10-CM

## 2019-07-17 LAB
INR PPP: 1.6 (ref 0.8–1.2)
PROTHROMBIN TIME: 16.7 SEC (ref 9.1–12)

## 2019-07-17 RX ORDER — AMIODARONE HYDROCHLORIDE 400 MG/1
400 TABLET ORAL DAILY
Qty: 30 TAB | Refills: 0 | Status: ON HOLD | OUTPATIENT
Start: 2019-07-17 | End: 2019-08-11

## 2019-07-17 RX ORDER — AMIODARONE HYDROCHLORIDE 200 MG/1
200 TABLET ORAL DAILY
Qty: 30 TAB | Refills: 3 | Status: ON HOLD | OUTPATIENT
Start: 2019-07-24 | End: 2019-08-11

## 2019-07-17 NOTE — TELEPHONE ENCOUNTER
"Bernie MARRERO notified of lab results and notes labs correspond with hx of ESRD and to confirm patient is compliant with dialysis.     S/w pt and informed him of results. Pt confirms he is going to dialysis today, in about 10-15. He has a MWF schedule. He states that his nephrologist is Dr. Larson      He says is slowly \"getting water off\" and states he feels fine. Informed pt that lab results would be sent to Dr. Larson for him to review. We also reviewed his upcoming cardiology appointments.                "

## 2019-07-17 NOTE — PROGRESS NOTES
Juan Mcdonnell,     Could you please call patient and have him decrease his amiodarone to 400 mg daily x 7 days (until 07/24/19) than 200 mg daily thereafter with repeat labs in 1 week.       Lets schedule a f/u appointment with HF clinic ASAP. I will follow up with dialysis regarding his fluid volume status.  How are his weights?     Thanks,     ILAN Jiang   [facility]   [phone]

## 2019-07-17 NOTE — TELEPHONE ENCOUNTER
DESIREE Jiang.  EDU Villegas,     Could you please call patient and have him decrease his amiodarone to 400 mg daily x 7 days (until 07/24/19) than 200 mg daily thereafter with repeat labs in 1 week.       Lets schedule a f/u appointment with HF clinic ASAP. I will follow up with dialysis regarding his fluid volume status.  How are his weights?     Thanks,     DESIREE Jiang.   [facility]   [phone]      Will call patient tomorrow as he is currently at dialysis.

## 2019-07-18 ENCOUNTER — TELEPHONE (OUTPATIENT)
Dept: OTHER | Facility: MEDICAL CENTER | Age: 79
End: 2019-07-18

## 2019-07-18 NOTE — TELEPHONE ENCOUNTER
ED/Kecia      Patient is asking if he can take CoQ10 as a medication supplement. He can be reached at [phone] or  [phone].

## 2019-07-19 ENCOUNTER — HOSPITAL ENCOUNTER (OUTPATIENT)
Facility: MEDICAL CENTER | Age: 79
End: 2019-07-19
Attending: INTERNAL MEDICINE | Admitting: INTERNAL MEDICINE
Payer: MEDICARE

## 2019-07-19 DIAGNOSIS — I48.92 ATRIAL FLUTTER, UNSPECIFIED TYPE (HCC): ICD-10-CM

## 2019-07-19 NOTE — TELEPHONE ENCOUNTER
Marybeth Powell, Med Ass't  Deidre Sargent, R.N.             Plan does not cover:     amiodarone (PACERONE) 400 MG tablet    okay to fill for the 200mg tablets?   If so please send new RX to pharmacy and advise patient of therapy change   Thank you      Have been attempting to get a hold of patient regarding medication update and needing follow up with HF clinic. No answer.

## 2019-07-22 NOTE — TELEPHONE ENCOUNTER
That is fine.  Although be advised, supplements are not controlled or verified by the FDA.     Thanks,     ILAN Jiang   [facility]   [phone] (Routing comment)     Lm pt at home re this, call with questions.

## 2019-07-23 ENCOUNTER — TELEPHONE (OUTPATIENT)
Dept: CARDIOLOGY | Facility: MEDICAL CENTER | Age: 79
End: 2019-07-23

## 2019-07-23 DIAGNOSIS — I50.21 SYSTOLIC CHF, ACUTE (HCC): ICD-10-CM

## 2019-07-23 NOTE — TELEPHONE ENCOUNTER
Spoke to pt. He was dispensed 200mg tabs and took 2 tabs qd for 7 days and will go to 1 tab qd. Does not need rx for 200mg. Referred to heart FAILURE and transferred to Vangie SHAH to schedule.

## 2019-07-24 ENCOUNTER — ANTICOAGULATION MONITORING (OUTPATIENT)
Dept: VASCULAR LAB | Facility: MEDICAL CENTER | Age: 79
End: 2019-07-24

## 2019-07-24 ENCOUNTER — TELEPHONE (OUTPATIENT)
Dept: VASCULAR LAB | Facility: MEDICAL CENTER | Age: 79
End: 2019-07-24

## 2019-07-24 LAB
INR PPP: 2.1 (ref 0.8–1.2)
PROTHROMBIN TIME: 22.4 SEC (ref 9.1–12)

## 2019-07-24 NOTE — TELEPHONE ENCOUNTER
----- Message from Long Larson M.D. sent at 7/23/2019  8:31 AM PDT -----  Thanks. Will keep trying to remove fluid.      ----- Message -----  From: ILAN Jiang  Sent: 7/17/2019   3:24 PM  To: Long Larson M.D.    Dear Dr. Larson,     We share a mutual patient. I am seeing him for new onset Atrial Flutter.  He was placed on amiodarone and metoprolol. Plan for ablation 07/29/19.    He has been having increased weight gain (+16 lbs) and swelling.  I had increased his torsemide to 30 mg daily. His EF 30%, and his recent labs showed proBNP >59880.    He is ESRD with HD MWF. Do you recommended pulling off more fluid during dialysis?     Please let me know if your have any other suggestions or recommendations.       Thanks,     ILAN Jiang   [facility]  [phone]

## 2019-07-24 NOTE — PROGRESS NOTES
Anticoagulation Summary  As of 2019    INR goal:   2.0-3.0   TTR:   37.3 % (3.6 wk)   INR used for dosin.1 (2019)   Warfarin maintenance plan:   5 mg (5 mg x 1) every day   Weekly warfarin total:   35 mg   No change documented:   Wallace Nur Med Ass't   Plan last modified:   Danae EstebanD (2019)   Next INR check:   2019   Target end date:   Indefinite    Indications    Atrial flutter (HCC) (Resolved) [I48.92]             Anticoagulation Episode Summary     INR check location:   [facility]    Preferred lab:       Send INR reminders to:       Comments:   [facility] [phone]      Anticoagulation Care Providers     Provider Role Specialty Phone number    [facility]   [phone]    Martha Light M.D.  Family Medicine [phone]        Anticoagulation Patient Findings  Patient Findings     Negatives:   Signs/symptoms of thrombosis, Signs/symptoms of bleeding, Laboratory test error suspected, Change in health, Change in alcohol use, Change in activity, Upcoming invasive procedure, Emergency department visit, Upcoming dental procedure, Missed doses, Extra doses, Change in medications, Change in diet/appetite, Hospital admission, Bruising, Other complaints        Plan: Spoke to patient. Patient is therapeutic and will remain on the same dose. Patient reports no unusual bleeding or bruising and no changes to medication or diet. Patient is to be checked again in 1 week.    Ace Bains. Ass't  [facility]    I have reviewed and am in agreement with the above stated plan on 19.  Federico Cerda, PharmD, BCACP

## 2019-07-25 ENCOUNTER — TELEPHONE (OUTPATIENT)
Dept: VASCULAR LAB | Facility: MEDICAL CENTER | Age: 79
End: 2019-07-25

## 2019-07-25 NOTE — TELEPHONE ENCOUNTER
[facility]    Returned pt's message and left a VM that he may call the clinic to discuss any questions he has regarding warfarin and any upcoming procedures.    Masood Hoffman, DanaeD

## 2019-07-26 ENCOUNTER — APPOINTMENT (OUTPATIENT)
Dept: ADMISSIONS | Facility: MEDICAL CENTER | Age: 79
End: 2019-07-26
Attending: INTERNAL MEDICINE
Payer: MEDICARE

## 2019-07-26 NOTE — TELEPHONE ENCOUNTER
Pt. Is scheduled for ablation on 7-29-19. He just spoke with pre-admitting nurse. He knows he is to CONYINUE Coumadin and all other meds. He is doiung pre-admission testing on day of procedure.

## 2019-07-28 ENCOUNTER — HOSPITAL ENCOUNTER (INPATIENT)
Facility: MEDICAL CENTER | Age: 79
LOS: 13 days | DRG: 628 | End: 2019-08-11
Attending: EMERGENCY MEDICINE | Admitting: HOSPITALIST
Payer: MEDICARE

## 2019-07-28 ENCOUNTER — APPOINTMENT (OUTPATIENT)
Dept: RADIOLOGY | Facility: MEDICAL CENTER | Age: 79
DRG: 628 | End: 2019-07-28
Attending: EMERGENCY MEDICINE
Payer: MEDICARE

## 2019-07-28 DIAGNOSIS — R06.03 ACUTE RESPIRATORY DISTRESS: ICD-10-CM

## 2019-07-28 DIAGNOSIS — Z99.2 DIALYSIS PATIENT (HCC): ICD-10-CM

## 2019-07-28 DIAGNOSIS — J44.1 ACUTE EXACERBATION OF CHRONIC OBSTRUCTIVE PULMONARY DISEASE (COPD) (HCC): ICD-10-CM

## 2019-07-28 DIAGNOSIS — E87.5 HYPERKALEMIA: ICD-10-CM

## 2019-07-28 LAB
ANISOCYTOSIS BLD QL SMEAR: ABNORMAL
BASOPHILS # BLD AUTO: 0.4 % (ref 0–1.8)
BASOPHILS # BLD: 0.04 K/UL (ref 0–0.12)
COMMENT 1642: NORMAL
EOSINOPHIL # BLD AUTO: 0.02 K/UL (ref 0–0.51)
EOSINOPHIL NFR BLD: 0.2 % (ref 0–6.9)
ERYTHROCYTE [DISTWIDTH] IN BLOOD BY AUTOMATED COUNT: 58.9 FL (ref 35.9–50)
HCT VFR BLD AUTO: 29.6 % (ref 42–52)
HGB BLD-MCNC: 8.6 G/DL (ref 14–18)
HYPOCHROMIA BLD QL SMEAR: ABNORMAL
IMM GRANULOCYTES # BLD AUTO: 0.06 K/UL (ref 0–0.11)
IMM GRANULOCYTES NFR BLD AUTO: 0.6 % (ref 0–0.9)
LYMPHOCYTES # BLD AUTO: 0.43 K/UL (ref 1–4.8)
LYMPHOCYTES NFR BLD: 4.4 % (ref 22–41)
MACROCYTES BLD QL SMEAR: ABNORMAL
MCH RBC QN AUTO: 30.4 PG (ref 27–33)
MCHC RBC AUTO-ENTMCNC: 29.1 G/DL (ref 33.7–35.3)
MCV RBC AUTO: 104.6 FL (ref 81.4–97.8)
MONOCYTES # BLD AUTO: 0.78 K/UL (ref 0–0.85)
MONOCYTES NFR BLD AUTO: 8 % (ref 0–13.4)
MORPHOLOGY BLD-IMP: NORMAL
NEUTROPHILS # BLD AUTO: 8.4 K/UL (ref 1.82–7.42)
NEUTROPHILS NFR BLD: 86.4 % (ref 44–72)
NRBC # BLD AUTO: 0.02 K/UL
NRBC BLD-RTO: 0.2 /100 WBC
OVALOCYTES BLD QL SMEAR: NORMAL
PLATELET # BLD AUTO: 228 K/UL (ref 164–446)
PLATELET BLD QL SMEAR: NORMAL
PMV BLD AUTO: 10.4 FL (ref 9–12.9)
POIKILOCYTOSIS BLD QL SMEAR: NORMAL
POLYCHROMASIA BLD QL SMEAR: NORMAL
RBC # BLD AUTO: 2.83 M/UL (ref 4.7–6.1)
RBC BLD AUTO: PRESENT
WBC # BLD AUTO: 9.7 K/UL (ref 4.8–10.8)

## 2019-07-28 PROCEDURE — 700101 HCHG RX REV CODE 250: Performed by: EMERGENCY MEDICINE

## 2019-07-28 PROCEDURE — 94760 N-INVAS EAR/PLS OXIMETRY 1: CPT

## 2019-07-28 PROCEDURE — 99285 EMERGENCY DEPT VISIT HI MDM: CPT

## 2019-07-28 PROCEDURE — 96374 THER/PROPH/DIAG INJ IV PUSH: CPT

## 2019-07-28 PROCEDURE — 84484 ASSAY OF TROPONIN QUANT: CPT

## 2019-07-28 PROCEDURE — 94640 AIRWAY INHALATION TREATMENT: CPT

## 2019-07-28 PROCEDURE — 80053 COMPREHEN METABOLIC PANEL: CPT

## 2019-07-28 PROCEDURE — 71045 X-RAY EXAM CHEST 1 VIEW: CPT

## 2019-07-28 PROCEDURE — 93005 ELECTROCARDIOGRAM TRACING: CPT | Performed by: EMERGENCY MEDICINE

## 2019-07-28 PROCEDURE — 85025 COMPLETE CBC W/AUTO DIFF WBC: CPT

## 2019-07-28 PROCEDURE — 85610 PROTHROMBIN TIME: CPT

## 2019-07-28 PROCEDURE — 36415 COLL VENOUS BLD VENIPUNCTURE: CPT

## 2019-07-28 PROCEDURE — 700111 HCHG RX REV CODE 636 W/ 250 OVERRIDE (IP): Performed by: EMERGENCY MEDICINE

## 2019-07-28 RX ORDER — IPRATROPIUM BROMIDE AND ALBUTEROL SULFATE 2.5; .5 MG/3ML; MG/3ML
6 SOLUTION RESPIRATORY (INHALATION)
Status: COMPLETED | OUTPATIENT
Start: 2019-07-28 | End: 2019-07-28

## 2019-07-28 RX ORDER — METHYLPREDNISOLONE SODIUM SUCCINATE 125 MG/2ML
125 INJECTION, POWDER, LYOPHILIZED, FOR SOLUTION INTRAMUSCULAR; INTRAVENOUS ONCE
Status: COMPLETED | OUTPATIENT
Start: 2019-07-28 | End: 2019-07-28

## 2019-07-28 RX ADMIN — IPRATROPIUM BROMIDE AND ALBUTEROL SULFATE 6 ML: .5; 3 SOLUTION RESPIRATORY (INHALATION) at 23:33

## 2019-07-28 RX ADMIN — METHYLPREDNISOLONE SODIUM SUCCINATE 125 MG: 125 INJECTION, POWDER, FOR SOLUTION INTRAMUSCULAR; INTRAVENOUS at 23:28

## 2019-07-28 ASSESSMENT — LIFESTYLE VARIABLES: DO YOU DRINK ALCOHOL: NO

## 2019-07-29 ENCOUNTER — APPOINTMENT (OUTPATIENT)
Dept: CARDIOLOGY | Facility: MEDICAL CENTER | Age: 79
End: 2019-07-29
Attending: NURSE PRACTITIONER
Payer: MEDICARE

## 2019-07-29 ENCOUNTER — APPOINTMENT (OUTPATIENT)
Dept: RADIOLOGY | Facility: MEDICAL CENTER | Age: 79
DRG: 628 | End: 2019-07-29
Attending: INTERNAL MEDICINE
Payer: MEDICARE

## 2019-07-29 PROBLEM — I48.0 PAROXYSMAL ATRIAL FIBRILLATION (HCC): Status: ACTIVE | Noted: 2019-07-29

## 2019-07-29 LAB
ALBUMIN SERPL BCP-MCNC: 3.7 G/DL (ref 3.2–4.9)
ALBUMIN/GLOB SERPL: 1.1 G/DL
ALP SERPL-CCNC: 178 U/L (ref 30–99)
ALT SERPL-CCNC: 125 U/L (ref 2–50)
ANION GAP SERPL CALC-SCNC: 21 MMOL/L (ref 0–11.9)
ANION GAP SERPL CALC-SCNC: 24 MMOL/L (ref 0–11.9)
AST SERPL-CCNC: 132 U/L (ref 12–45)
BILIRUB SERPL-MCNC: 0.8 MG/DL (ref 0.1–1.5)
BUN SERPL-MCNC: 76 MG/DL (ref 8–22)
BUN SERPL-MCNC: 94 MG/DL (ref 8–22)
CALCIUM SERPL-MCNC: 9.4 MG/DL (ref 8.5–10.5)
CALCIUM SERPL-MCNC: 9.6 MG/DL (ref 8.5–10.5)
CHLORIDE SERPL-SCNC: 93 MMOL/L (ref 96–112)
CHLORIDE SERPL-SCNC: 95 MMOL/L (ref 96–112)
CO2 SERPL-SCNC: 17 MMOL/L (ref 20–33)
CO2 SERPL-SCNC: 18 MMOL/L (ref 20–33)
CREAT SERPL-MCNC: 12.35 MG/DL (ref 0.5–1.4)
CREAT SERPL-MCNC: 9.53 MG/DL (ref 0.5–1.4)
EKG IMPRESSION: NORMAL
GLOBULIN SER CALC-MCNC: 3.3 G/DL (ref 1.9–3.5)
GLUCOSE SERPL-MCNC: 101 MG/DL (ref 65–99)
GLUCOSE SERPL-MCNC: 172 MG/DL (ref 65–99)
INR PPP: 2.16 (ref 0.87–1.13)
POTASSIUM SERPL-SCNC: 5.8 MMOL/L (ref 3.6–5.5)
POTASSIUM SERPL-SCNC: 7.1 MMOL/L (ref 3.6–5.5)
PROT SERPL-MCNC: 7 G/DL (ref 6–8.2)
PROTHROMBIN TIME: 24.8 SEC (ref 12–14.6)
SODIUM SERPL-SCNC: 132 MMOL/L (ref 135–145)
SODIUM SERPL-SCNC: 136 MMOL/L (ref 135–145)
TROPONIN T SERPL-MCNC: 197 NG/L (ref 6–19)
TROPONIN T SERPL-MCNC: 207 NG/L (ref 6–19)

## 2019-07-29 PROCEDURE — 90935 HEMODIALYSIS ONE EVALUATION: CPT

## 2019-07-29 PROCEDURE — 84484 ASSAY OF TROPONIN QUANT: CPT

## 2019-07-29 PROCEDURE — 74018 RADEX ABDOMEN 1 VIEW: CPT

## 2019-07-29 PROCEDURE — 5A1D70Z PERFORMANCE OF URINARY FILTRATION, INTERMITTENT, LESS THAN 6 HOURS PER DAY: ICD-10-PCS | Performed by: INTERNAL MEDICINE

## 2019-07-29 PROCEDURE — 99223 1ST HOSP IP/OBS HIGH 75: CPT | Mod: AI | Performed by: HOSPITALIST

## 2019-07-29 PROCEDURE — 700102 HCHG RX REV CODE 250 W/ 637 OVERRIDE(OP): Performed by: HOSPITALIST

## 2019-07-29 PROCEDURE — 700101 HCHG RX REV CODE 250

## 2019-07-29 PROCEDURE — 80048 BASIC METABOLIC PNL TOTAL CA: CPT

## 2019-07-29 PROCEDURE — 99223 1ST HOSP IP/OBS HIGH 75: CPT | Performed by: INTERNAL MEDICINE

## 2019-07-29 PROCEDURE — A9270 NON-COVERED ITEM OR SERVICE: HCPCS | Performed by: HOSPITALIST

## 2019-07-29 PROCEDURE — 770020 HCHG ROOM/CARE - TELE (206)

## 2019-07-29 PROCEDURE — 700111 HCHG RX REV CODE 636 W/ 250 OVERRIDE (IP): Performed by: HOSPITALIST

## 2019-07-29 RX ORDER — POLYETHYLENE GLYCOL 3350 17 G/17G
1 POWDER, FOR SOLUTION ORAL
Status: DISCONTINUED | OUTPATIENT
Start: 2019-07-29 | End: 2019-08-11 | Stop reason: HOSPADM

## 2019-07-29 RX ORDER — ONDANSETRON 4 MG/1
4 TABLET, ORALLY DISINTEGRATING ORAL EVERY 4 HOURS PRN
Status: DISCONTINUED | OUTPATIENT
Start: 2019-07-29 | End: 2019-08-11 | Stop reason: HOSPADM

## 2019-07-29 RX ORDER — AMOXICILLIN 250 MG
2 CAPSULE ORAL 2 TIMES DAILY
Status: DISCONTINUED | OUTPATIENT
Start: 2019-07-29 | End: 2019-08-11 | Stop reason: HOSPADM

## 2019-07-29 RX ORDER — TORSEMIDE 20 MG/1
30 TABLET ORAL DAILY
Status: DISCONTINUED | OUTPATIENT
Start: 2019-07-29 | End: 2019-08-01

## 2019-07-29 RX ORDER — HEPARIN SODIUM 1000 [USP'U]/ML
1500 INJECTION, SOLUTION INTRAVENOUS; SUBCUTANEOUS
Status: DISCONTINUED | OUTPATIENT
Start: 2019-07-29 | End: 2019-08-11 | Stop reason: HOSPADM

## 2019-07-29 RX ORDER — WARFARIN SODIUM 5 MG/1
5 TABLET ORAL
Status: DISCONTINUED | OUTPATIENT
Start: 2019-07-29 | End: 2019-07-30

## 2019-07-29 RX ORDER — PRAVASTATIN SODIUM 20 MG
20 TABLET ORAL NIGHTLY
Status: DISCONTINUED | OUTPATIENT
Start: 2019-07-29 | End: 2019-08-11 | Stop reason: HOSPADM

## 2019-07-29 RX ORDER — LEVALBUTEROL INHALATION SOLUTION 1.25 MG/3ML
1.25 SOLUTION RESPIRATORY (INHALATION)
Status: DISCONTINUED | OUTPATIENT
Start: 2019-07-29 | End: 2019-08-11 | Stop reason: HOSPADM

## 2019-07-29 RX ORDER — OXYCODONE HYDROCHLORIDE 5 MG/1
5 TABLET ORAL
Status: DISCONTINUED | OUTPATIENT
Start: 2019-07-29 | End: 2019-08-11 | Stop reason: HOSPADM

## 2019-07-29 RX ORDER — AMIODARONE HYDROCHLORIDE 200 MG/1
200 TABLET ORAL DAILY
Status: DISCONTINUED | OUTPATIENT
Start: 2019-07-29 | End: 2019-08-09

## 2019-07-29 RX ORDER — FLUTICASONE PROPIONATE 44 UG/1
2 AEROSOL, METERED RESPIRATORY (INHALATION)
Status: DISCONTINUED | OUTPATIENT
Start: 2019-07-29 | End: 2019-08-02

## 2019-07-29 RX ORDER — ACETAMINOPHEN 325 MG/1
650 TABLET ORAL EVERY 6 HOURS PRN
Status: DISCONTINUED | OUTPATIENT
Start: 2019-07-29 | End: 2019-08-11 | Stop reason: HOSPADM

## 2019-07-29 RX ORDER — LIDOCAINE HYDROCHLORIDE 10 MG/ML
INJECTION, SOLUTION INFILTRATION; PERINEURAL
Status: COMPLETED
Start: 2019-07-29 | End: 2019-07-29

## 2019-07-29 RX ORDER — HEPARIN SODIUM 5000 [USP'U]/ML
5000 INJECTION, SOLUTION INTRAVENOUS; SUBCUTANEOUS EVERY 8 HOURS
Status: DISCONTINUED | OUTPATIENT
Start: 2019-07-29 | End: 2019-07-29

## 2019-07-29 RX ORDER — MORPHINE SULFATE 4 MG/ML
2 INJECTION, SOLUTION INTRAMUSCULAR; INTRAVENOUS
Status: DISCONTINUED | OUTPATIENT
Start: 2019-07-29 | End: 2019-08-11 | Stop reason: HOSPADM

## 2019-07-29 RX ORDER — OXYCODONE HYDROCHLORIDE 5 MG/1
2.5 TABLET ORAL
Status: DISCONTINUED | OUTPATIENT
Start: 2019-07-29 | End: 2019-08-11 | Stop reason: HOSPADM

## 2019-07-29 RX ORDER — ONDANSETRON 2 MG/ML
4 INJECTION INTRAMUSCULAR; INTRAVENOUS EVERY 4 HOURS PRN
Status: DISCONTINUED | OUTPATIENT
Start: 2019-07-29 | End: 2019-08-11 | Stop reason: HOSPADM

## 2019-07-29 RX ORDER — TAMSULOSIN HYDROCHLORIDE 0.4 MG/1
0.8 CAPSULE ORAL EVERY MORNING
Status: DISCONTINUED | OUTPATIENT
Start: 2019-07-29 | End: 2019-08-01

## 2019-07-29 RX ORDER — OMEPRAZOLE 20 MG/1
40 CAPSULE, DELAYED RELEASE ORAL EVERY MORNING
Status: DISCONTINUED | OUTPATIENT
Start: 2019-07-29 | End: 2019-08-11 | Stop reason: HOSPADM

## 2019-07-29 RX ORDER — DOXAZOSIN 2 MG/1
4 TABLET ORAL EVERY EVENING
Status: DISCONTINUED | OUTPATIENT
Start: 2019-07-29 | End: 2019-08-01

## 2019-07-29 RX ORDER — BISACODYL 10 MG
10 SUPPOSITORY, RECTAL RECTAL
Status: DISCONTINUED | OUTPATIENT
Start: 2019-07-29 | End: 2019-08-11 | Stop reason: HOSPADM

## 2019-07-29 RX ORDER — CALCIUM ACETATE 667 MG/1
2001 TABLET ORAL
Status: DISCONTINUED | OUTPATIENT
Start: 2019-07-29 | End: 2019-08-11 | Stop reason: HOSPADM

## 2019-07-29 RX ORDER — LISINOPRIL 5 MG/1
5 TABLET ORAL 2 TIMES DAILY
Status: DISCONTINUED | OUTPATIENT
Start: 2019-07-29 | End: 2019-07-30

## 2019-07-29 RX ORDER — TRAZODONE HYDROCHLORIDE 150 MG/1
300 TABLET ORAL NIGHTLY
Status: DISCONTINUED | OUTPATIENT
Start: 2019-07-29 | End: 2019-08-02

## 2019-07-29 RX ADMIN — Medication 2001 MG: at 09:00

## 2019-07-29 RX ADMIN — OMEPRAZOLE 40 MG: 20 CAPSULE, DELAYED RELEASE ORAL at 05:51

## 2019-07-29 RX ADMIN — PRAVASTATIN SODIUM 20 MG: 20 TABLET ORAL at 19:46

## 2019-07-29 RX ADMIN — MORPHINE SULFATE 2 MG: 4 INJECTION INTRAVENOUS at 20:25

## 2019-07-29 RX ADMIN — TORSEMIDE 30 MG: 20 TABLET ORAL at 06:54

## 2019-07-29 RX ADMIN — Medication 2001 MG: at 13:39

## 2019-07-29 RX ADMIN — OXYCODONE HYDROCHLORIDE 5 MG: 5 TABLET ORAL at 19:46

## 2019-07-29 RX ADMIN — Medication 1 ML: at 02:10

## 2019-07-29 RX ADMIN — UMECLIDINIUM BROMIDE AND VILANTEROL TRIFENATATE 1 PUFF: 62.5; 25 POWDER RESPIRATORY (INHALATION) at 17:16

## 2019-07-29 RX ADMIN — FLUTICASONE PROPIONATE 88 MCG: 44 AEROSOL, METERED RESPIRATORY (INHALATION) at 17:17

## 2019-07-29 RX ADMIN — METOPROLOL SUCCINATE 150 MG: 50 TABLET, EXTENDED RELEASE ORAL at 05:51

## 2019-07-29 RX ADMIN — LISINOPRIL 5 MG: 5 TABLET ORAL at 05:51

## 2019-07-29 RX ADMIN — LISINOPRIL 5 MG: 5 TABLET ORAL at 17:20

## 2019-07-29 RX ADMIN — WARFARIN SODIUM 5 MG: 5 TABLET ORAL at 17:17

## 2019-07-29 RX ADMIN — SENNOSIDES, DOCUSATE SODIUM 2 TABLET: 50; 8.6 TABLET, FILM COATED ORAL at 17:20

## 2019-07-29 RX ADMIN — TAMSULOSIN HYDROCHLORIDE 0.8 MG: 0.4 CAPSULE ORAL at 05:51

## 2019-07-29 RX ADMIN — LIDOCAINE HYDROCHLORIDE 1 ML: 10 INJECTION, SOLUTION INFILTRATION; PERINEURAL at 02:10

## 2019-07-29 RX ADMIN — DOXAZOSIN 4 MG: 2 TABLET ORAL at 17:20

## 2019-07-29 RX ADMIN — AMIODARONE HYDROCHLORIDE 200 MG: 200 TABLET ORAL at 05:51

## 2019-07-29 ASSESSMENT — COGNITIVE AND FUNCTIONAL STATUS - GENERAL
SUGGESTED CMS G CODE MODIFIER DAILY ACTIVITY: CH
SUGGESTED CMS G CODE MODIFIER MOBILITY: CH
MOBILITY SCORE: 24
DAILY ACTIVITIY SCORE: 24

## 2019-07-29 ASSESSMENT — ENCOUNTER SYMPTOMS
CARDIOVASCULAR NEGATIVE: 1
SHORTNESS OF BREATH: 1
EYES NEGATIVE: 1
PSYCHIATRIC NEGATIVE: 1
MUSCULOSKELETAL NEGATIVE: 1
ABDOMINAL PAIN: 1
CHILLS: 1
DIZZINESS: 1

## 2019-07-29 ASSESSMENT — CHA2DS2 SCORE
PRIOR STROKE OR TIA OR THROMBOEMBOLISM: NO
VASCULAR DISEASE: NO
AGE 75 OR GREATER: YES
CHF OR LEFT VENTRICULAR DYSFUNCTION: YES
SEX: MALE
CHA2DS2 VASC SCORE: 4
DIABETES: NO
HYPERTENSION: YES
AGE 65 TO 74: NO

## 2019-07-29 ASSESSMENT — LIFESTYLE VARIABLES: ALCOHOL_USE: NO

## 2019-07-29 NOTE — ASSESSMENT & PLAN NOTE
Post emergent hemodialysis 7/29/2019 and daily HD   Low k, renal diet  Lisinopril stopped.   Hold diuretics for now due to low BP

## 2019-07-29 NOTE — ED TRIAGE NOTES
Pt presents to ED by EMS for c/o shortness of breath x2 days    Hx of COPD, Afib, AKD with dialysis MWF- pt went on Friday

## 2019-07-29 NOTE — ASSESSMENT & PLAN NOTE
Without current exacerbation.   Respiratory therapy as needed.   Continue scheduled Flovent, As needed Xopenex with scheduled Ellipta  Rt protocols

## 2019-07-29 NOTE — ASSESSMENT & PLAN NOTE
With  A flutter   AC has been reversed  Continue amiodarone  Plan was for ablation with Dr. Grissom  Discussed with cardiology

## 2019-07-29 NOTE — H&P
Hospital Medicine History & Physical Note    Date of Service  7/29/2019    Primary Care Physician  Martha Light M.D.    Consultants  Nephrology [facility]    Code Status  Full code     Chief Complaint  Shortness of breath     History of Presenting Illness  78 y.o. Male with history of benign prostatic hypertrophy which is well controlled, end-stage renal disease on hemodialysis, and dyslipidemia on statin therapy, was apparently in his usual state of health until his most recent hemodialysis appointment on Friday, where he had 4 L of fluid removed.  He reports after hemodialysis, he continued to have shortness of breath, abdominal pain, chills, and feeling somewhat lightheaded.  He subsequently presented to the emergency department with the symptoms did not improve.  He noted no exacerbating or alleviating factors.  Of note, he reports that he is to have an ablation tomorrow for his underlying atrial fibrillation.  He currently denies chest pain.    Review of Systems  Review of Systems   Constitutional: Positive for chills.   HENT: Negative.    Eyes: Negative.    Respiratory: Positive for shortness of breath.    Cardiovascular: Negative.    Gastrointestinal: Positive for abdominal pain.   Genitourinary: Negative.    Musculoskeletal: Negative.    Skin: Negative.    Neurological: Positive for dizziness.   Endo/Heme/Allergies: Negative.    Psychiatric/Behavioral: Negative.        Past Medical History   has a past medical history of Anesthesia; Anticoagulation monitoring, special range; Aortic stenosis; Arterial leg ulcer ([facility]) (11/8/2018); Atrial flutter ([facility]) (6/12/2019); Basal cell carcinoma of left cheek (3/26/2015); BPH (7/14/2009); Bronchitis (12/25/2018); CAD (coronary artery disease) (2/20/2014); CATARACT; CHF (congestive heart failure), NYHA class II, chronic, systolic ([facility]) E F30 in setting of atrial flutter (6/13/2019); Chronic respiratory failure with hypoxia ([facility]) (5/8/2016); CKD (chronic kidney disease)  stage 4, GFR 15-29 ml/min ([facility]) (1/15/2010); COPD (chronic obstructive pulmonary disease) ([facility]); Detached retina; Dialysis; EMPHYSEMA; Gout; Heart burn; Heart murmur; High cholesterol; Hypertension; Indigestion; Kidney cyst; Leg pain, bilateral (8/10/2015); On supplemental oxygen therapy; Peripheral vascular disease ([facility]) (8/10/2015); Pneumonia; Primary insomnia (3/22/2018); Proteinuria; PVD (peripheral vascular disease) ([facility]); and Sleep apnea.    Surgical History   has a past surgical history that includes cataract phaco with iol (4/8/08); av fistula creation (2/12/2010); av fistula revision (2/19/2010); av fistulogram (7/23/2010); angioplasty balloon (7/23/2010); av fistulogram (9/17/2010); angioplasty balloon (9/17/2010); vitrectomy posterior (1/18/2011); scleral buckling (1/18/2011); recovery (8/12/2011); vitrectomy posterior (10/11/2011); recovery (7/23/2012); recovery (1/29/2013); recovery (7/2/2013); av fistula thrombolysis (7/2/2013); recovery (12/17/2013); recovery (3/24/2014); recovery (7/29/2014); recovery (3/24/2015); recovery (12/23/2015); gastroscopy with biopsy (8/13/2016); colonoscopy - endo (8/15/2016); endoscopy procedure (3/21/2018); femoral endarterectomy (Left, 1/25/2019); femoral popliteal bypass (Left, 1/25/2019); angiogram (Left, 1/25/2019); other orthopedic surgery (1998); av fistula creation (Right, 2/21/2019); lisa (6/13/2019); and cardioversion (6/13/2019).     Family History  family history includes Genitourinary () in his maternal aunt; Hypertension in his brother and mother; Lung Disease in his father; Stroke in his mother.     Social History   reports that he quit smoking about 10 years ago. His smoking use included Cigarettes. He has a 40.00 pack-year smoking history. He has never used smokeless tobacco. He reports that he does not drink alcohol or use drugs.    Allergies  No Known Allergies    Medications  Prior to Admission Medications   Prescriptions Last Dose Informant Patient  Reported? Taking?   ALBUTEROL INH   Yes No   Sig: Inhale  by mouth as needed.   Fluticasone-Umeclidin-Vilant (TRELEGY ELLIPTA) 100-62.5-25 MCG/INH AEROSOL POWDER, BREATH ACTIVATED  Patient's Home Pharmacy No No   Sig: Inhale 1 Puff by mouth every day. Rinse mouth after use   amiodarone (CORDARONE) 200 MG Tab   No No   Sig: Take 1 Tab by mouth every day.   amiodarone (PACERONE) 400 MG tablet   No No   Sig: Take 1 Tab by mouth every day.   calcium acetate (PHOS-LO) 667 MG Cap  Patient's Home Pharmacy Yes No   Sig: Take 2,001 mg by mouth 3 times a day, with meals. 2001mg three times a day with meals and 1334mg with snacks   doxazosin (CARDURA) 4 MG Tab  Patient's Home Pharmacy Yes No   Sig: Take 4 mg by mouth every evening.   levalbuterol (XOPENEX) 1.25 MG/3ML Nebu Soln  Patient's Home Pharmacy No No   Sig: 3 mL by Nebulization route every four hours as needed for Shortness of Breath.   lisinopril (PRINIVIL) 5 MG Tab   No No   Sig: Take 1 Tab by mouth 2 Times a Day.   metoprolol SR (TOPROL XL) 100 MG TABLET SR 24 HR   No No   Sig: Take 1.5 Tabs by mouth every day.   omeprazole (PRILOSEC) 40 MG delayed-release capsule  Patient's Home Pharmacy Yes No   Sig: Take 40 mg by mouth every morning.   pravastatin (PRAVACHOL) 20 MG Tab  Patient's Home Pharmacy Yes No   Sig: Take 20 mg by mouth every evening.   tamsulosin (FLOMAX) 0.4 MG capsule  Patient's Home Pharmacy Yes No   Sig: Take 0.8 mg by mouth every morning.   torsemide (DEMADEX) 10 MG tablet   No No   Sig: Take 3 Tabs by mouth every day.   traZODone (DESYREL) 150 MG Tab  Patient's Home Pharmacy Yes No   Sig: Take 300 mg by mouth every evening.   warfarin (COUMADIN) 5 MG Tab   No No   Sig: Take 1 Tab by mouth every day.   zolpidem (AMBIEN) 10 MG Tab   No No   Sig: Take 1 Tab by mouth every bedtime for 90 days.      Facility-Administered Medications: None       Physical Exam  Temp:  [36.5 °C (97.7 °F)] 36.5 °C (97.7 °F)  Pulse:  [86-88] 88  Resp:  [20-26] 20  SpO2:  [97  %-98 %] 97 %    Physical Exam   Constitutional: He is oriented to person, place, and time. He appears well-developed and well-nourished. No distress.   HENT:   Head: Normocephalic and atraumatic.   Eyes: Pupils are equal, round, and reactive to light. Conjunctivae are normal.   Neck: Normal range of motion. Neck supple. No tracheal deviation present. No thyromegaly present.   Cardiovascular: Normal rate, regular rhythm and normal heart sounds.  Exam reveals no gallop and no friction rub.    No murmur heard.  Pulmonary/Chest: Effort normal and breath sounds normal. No respiratory distress. He has no wheezes.   Abdominal: Soft. Bowel sounds are normal. He exhibits no distension and no mass. There is no tenderness. There is no rebound and no guarding.   Musculoskeletal: Normal range of motion. He exhibits no edema.   Left upper extremity fistula   Lymphadenopathy:     He has no cervical adenopathy.   Neurological: He is alert and oriented to person, place, and time. No cranial nerve deficit.   Skin: Skin is warm and dry. He is not diaphoretic.   Psychiatric: He has a normal mood and affect.   Nursing note and vitals reviewed.      Laboratory:  Recent Labs      07/28/19   2321   WBC  9.7   RBC  2.83*   HEMOGLOBIN  8.6*   HEMATOCRIT  29.6*   MCV  104.6*   MCH  30.4   MCHC  29.1*   RDW  58.9*   PLATELETCT  228   MPV  10.4     Recent Labs      07/28/19   2321   SODIUM  132*   POTASSIUM  7.1*   CHLORIDE  93*   CO2  18*   GLUCOSE  101*   BUN  94*   CREATININE  12.35*   CALCIUM  9.6     Recent Labs      07/28/19   2321   ALTSGPT  125*   ASTSGOT  132*   ALKPHOSPHAT  178*   TBILIRUBIN  0.8   GLUCOSE  101*     Recent Labs      07/28/19   2321   INR  2.16*     No results for input(s): NTPROBNP in the last 72 hours.      Recent Labs      07/28/19   2321   TROPONINT  207*       Urinalysis:    No results found     Imaging:  DX-CHEST-PORTABLE (1 VIEW)   Final Result      1.  Stable cardiomegaly.   2.  No acute abnormality.             Assessment/Plan:  I anticipate this patient will require at least two midnights for appropriate medical management, necessitating inpatient admission.    * Hyperkalemia, diminished renal excretion- (present on admission)   Assessment & Plan    Plan for emergent hemodialysis which has been ordered by Dr. Larson      Essential hypertension- (present on admission)   Assessment & Plan    Controlled with current medication regimen.  Monitor.      Dyslipidemia- (present on admission)   Assessment & Plan    On statin therapy.  Monitor.      Paroxysmal atrial fibrillation (HCC)   Assessment & Plan    On rate control with current rate controlled, and anticoagulated with Coumadin which we dosed by pharmacy.     ESRD (end stage renal disease) on dialysis ([facility])- (present on admission)   Assessment & Plan    Plan for emergent HD this AM due to critical hyperkalemia.  Monitor.  As per nephrology for further HD.      Anemia in CKD (chronic kidney disease)- (present on admission)   Assessment & Plan    Stable.  No current evidence of bleed. Transfuse if needed for hemoglobin less than 7 gm/dL       COPD (chronic obstructive pulmonary disease) ([facility])- (present on admission)   Assessment & Plan    Without current exacerbation.  Monitor.  Respiratory therapy as needed.      BPH (benign prostatic hypertrophy)- (present on admission)   Assessment & Plan    Stable on medication regimen.          VTE prophylaxis: SCd, heparin

## 2019-07-29 NOTE — PROGRESS NOTES
Inpatient Anticoagulation Service Note    Date: 7/29/2019    Reason for Anticoagulation: Atrial Fibrillation   Target INR: 2.0 to 3.0  [id number] VASc Score: 4  HAS-BLED Score: 3   Hemoglobin Value: (!) 8.6  Hematocrit Value: (!) 29.6    INR from last 7 days     Date/Time INR Value    07/28/19 2321 (!)  2.16        Dose from last 7 days     Date/Time Dose (mg)    07/29/19 0331  5        Average Dose (mg): 5  Significant Interactions: Amiodarone, Statin, Proton Pump Inhibitor     Comments:   Home Coumadin to continue for patient admitted with SOB. INR therapeutic on admission. Unable to perform med rec; will ask med rec technicians to interview in AM. Most recent [facility] notes reflect dose of 5 mg daily. No new DDIs noted; all are stable from home. However, amiodarone does appear to be a new start this month. Hgb/hct appear to be around baseline for pt. Renal diet ordered.     Plan:  Will proceed with 5 mg daily    Education Material Provided?: No    Pharmacist suggested discharge dosing: likely continue with 5 mg daily, with follow up INR check as outpatient within 1 week of hospital discharge     Tyrone Molina, PharmD

## 2019-07-29 NOTE — PROGRESS NOTES
Hemodialysis treatment ordered today per [facility] x 3 hours. Treatment initiated at 0215, ended at 0515.     Patient tolerated tx; see paper flow sheet for details.     Net UF 2,800 mL.     Needles removed from access site. Dressings applied and sites held x 10 minutes; verified no bleeding. Positive bruit/thrill post tx. Staff RN to monitor AVF for breakthrough bleeding. Should breakthrough bleeding occur, staff RN to apply pressure to access sites until bleeding resolved. Notify Dialysis and Nephrologist for follow-up.    Report given to Primary RN.

## 2019-07-29 NOTE — ASSESSMENT & PLAN NOTE
Unstable BP with hypotensive episodes , likely due to Pericardial tamponade.   Not an acute issue  Hold antihypertensives.

## 2019-07-29 NOTE — PROGRESS NOTES
2 RN skin check complete with Kendra RN  Devices in place na.  Skin assessed under devices na.  Confirmed pressure ulcers found on na.  New potential pressure ulcers noted on na. Wound consult placed na.  The following interventions in place na*    Pt is ambulatory, weak with assistance x1. Skin is intact with discoloration/leathery throughout body. No signs of skin breakdown noted.

## 2019-07-29 NOTE — ED NOTES
"Pt resting in bed with eyes closed, breathing even and unlabored, pt states he \"feels better\", pt refusing blood pressures on legs stating \"they aren't accurate\" but was not aggreeable to any blood pressure to arms  "

## 2019-07-29 NOTE — ED PROVIDER NOTES
ED Provider Note    Scribed for Federico Hartley M.D. by Mami Bean. 7/28/2019, 10:35 PM.    Primary care provider: Martha Light M.D.  Means of arrival: EMS  History obtained from: Patient  History limited by: None    CHIEF COMPLAINT  Chief Complaint   Patient presents with   • Shortness of Breath       HPI  Parmjit Castelan is a 78 y.o. Male, with a history of acute kidney disease, who presents to the Emergency Department for a chief complaint of acute, constant shortness of breath onset two days ago with worsening severity today prompting him to visit the ED. No exacerbating or alleviating factors were reported. The patient notes that he receives dialysis every Monday, Wednesday and Friday. He states that his last dialysis appointment was on 7/26/19 and reports that he had extra liter of fluids removed. Endorses hyperventilation, abdominal pain, coughing, lightheadedness and chills. Denies chest pain or fevers. The patient notes that he is supposed to have a cardiac ablation performed in one day for his atrial fibrillation. Past medical history includes chronic obstructive pulmonary disease, atrial fibrillation, acute kidney disease.     REVIEW OF SYSTEMS  Pertinent positives include shortness of breath, hyperventilation,  abdominal pain, coughing, lightheadedness, chills. Pertinent negatives include chest pain or fevers. All other systems negative.    PAST MEDICAL HISTORY  The patient has a past medical history of Anesthesia; Anticoagulation monitoring, special range; Aortic stenosis; Arterial leg ulcer ([facility]) (11/8/2018); Atrial flutter ([facility]) (6/12/2019); Basal cell carcinoma of left cheek (3/26/2015); BPH (7/14/2009); Bronchitis (12/25/2018); CAD (coronary artery disease) (2/20/2014); CATARACT; CHF (congestive heart failure), NYHA class II, chronic, systolic ([facility]) E F30 in setting of atrial flutter (6/13/2019); Chronic respiratory failure with hypoxia ([facility]) (5/8/2016); CKD (chronic kidney disease) stage  4, GFR 15-29 ml/min ([facility]) (1/15/2010); COPD (chronic obstructive pulmonary disease) ([facility]); Detached retina; Dialysis; EMPHYSEMA; Gout; Heart burn; Heart murmur; High cholesterol; Hypertension; Indigestion; Kidney cyst; Leg pain, bilateral (8/10/2015); On supplemental oxygen therapy; Peripheral vascular disease ([facility]) (8/10/2015); Pneumonia; Primary insomnia (3/22/2018); Proteinuria; PVD (peripheral vascular disease) ([facility]); and Sleep apnea.    SURGICAL HISTORY  The patient has a past surgical history that includes cataract phaco with iol (4/8/08); av fistula creation (2/12/2010); av fistula revision (2/19/2010); av fistulogram (7/23/2010); angioplasty balloon (7/23/2010); av fistulogram (9/17/2010); angioplasty balloon (9/17/2010); vitrectomy posterior (1/18/2011); scleral buckling (1/18/2011); recovery (8/12/2011); vitrectomy posterior (10/11/2011); recovery (7/23/2012); recovery (1/29/2013); recovery (7/2/2013); av fistula thrombolysis (7/2/2013); recovery (12/17/2013); recovery (3/24/2014); recovery (7/29/2014); recovery (3/24/2015); recovery (12/23/2015); gastroscopy with biopsy (8/13/2016); colonoscopy - endo (8/15/2016); endoscopy procedure (3/21/2018); femoral endarterectomy (Left, 1/25/2019); femoral popliteal bypass (Left, 1/25/2019); angiogram (Left, 1/25/2019); other orthopedic surgery (1998); av fistula creation (Right, 2/21/2019); lisa (6/13/2019); and cardioversion (6/13/2019).    SOCIAL HISTORY  Social History   Substance Use Topics   • Smoking status: Former Smoker     Packs/day: 1.00     Years: 40.00     Types: Cigarettes     Quit date: 1/1/2009   • Smokeless tobacco: Never Used      Comment: 1 pk a day for 35 yrs, QUIT JAN 1 2010   • Alcohol use No      History   Drug Use No       FAMILY HISTORY  Family History   Problem Relation Age of Onset   • Stroke Mother    • Hypertension Mother    • Lung Disease Father         Emphysema, resp failure   • Genitourinary () Maternal Aunt         hematuria   •  Hypertension Brother        CURRENT MEDICATIONS  Home Medications     Reviewed by Simeon Mcmillan R.N. (Registered Nurse) on 07/28/19 at 2232  Med List Status: Complete   Medication Last Dose Status   ALBUTEROL INH  Active   amiodarone (CORDARONE) 200 MG Tab  Active   amiodarone (PACERONE) 400 MG tablet  Active   calcium acetate (PHOS-LO) 667 MG Cap  Active   doxazosin (CARDURA) 4 MG Tab  Active   Fluticasone-Umeclidin-Vilant (TRELEGY ELLIPTA) 100-62.5-25 MCG/INH AEROSOL POWDER, BREATH ACTIVATED  Active   levalbuterol (XOPENEX) 1.25 MG/3ML Nebu Soln  Active   lisinopril (PRINIVIL) 5 MG Tab  Active   metoprolol SR (TOPROL XL) 100 MG TABLET SR 24 HR  Active   omeprazole (PRILOSEC) 40 MG delayed-release capsule  Active   pravastatin (PRAVACHOL) 20 MG Tab  Active   tamsulosin (FLOMAX) 0.4 MG capsule  Active   torsemide (DEMADEX) 10 MG tablet  Active   traZODone (DESYREL) 150 MG Tab  Active   warfarin (COUMADIN) 5 MG Tab  Active   zolpidem (AMBIEN) 10 MG Tab  Active                ALLERGIES  None reported.     PHYSICAL EXAM  VITAL SIGNS: Pulse 86   Temp 36.5 °C (97.7 °F) (Tympanic)   Resp (!) 24   Wt 74.8 kg (165 lb)   SpO2 98%   BMI 25.09 kg/m²     Constitutional: Well developed, Well nourished, Mild distress.   HENT: Normocephalic, Atraumatic.   Eyes: Conjunctiva normal, No discharge.   Cardiovascular: Irregular heart rate, Normal rhythm, No murmurs, equal pulses. See extremities for further details.   Pulmonary: Moderate respiratory distress with bilateral wheezing. No rales, No rhonchi.  Chest: No chest wall tenderness or deformity.   Abdomen: Slightly distended with no tenderness. Soft, No masses, no rebound, no guarding.  Musculoskeletal: No major deformities noted, No tenderness.   Extremities: No edema in legs.  Skin: Warm, Dry, No erythema, No rash.   Neurologic: Alert & oriented x 3, Normal motor function,  No focal deficits noted.   Psychiatric: Affect normal, Judgment normal, Mood normal.      LABS  Results for orders placed or performed during the hospital encounter of 07/28/19   CBC w/ Differential   Result Value Ref Range    WBC 9.7 4.8 - 10.8 K/uL    RBC 2.83 (L) 4.70 - 6.10 M/uL    Hemoglobin 8.6 (L) 14.0 - 18.0 g/dL    Hematocrit 29.6 (L) 42.0 - 52.0 %    .6 (H) 81.4 - 97.8 fL    MCH 30.4 27.0 - 33.0 pg    MCHC 29.1 (L) 33.7 - 35.3 g/dL    RDW 58.9 (H) 35.9 - 50.0 fL    Platelet Count 228 164 - 446 K/uL    MPV 10.4 9.0 - 12.9 fL    Neutrophils-Polys 86.40 (H) 44.00 - 72.00 %    Lymphocytes 4.40 (L) 22.00 - 41.00 %    Monocytes 8.00 0.00 - 13.40 %    Eosinophils 0.20 0.00 - 6.90 %    Basophils 0.40 0.00 - 1.80 %    Immature Granulocytes 0.60 0.00 - 0.90 %    Nucleated RBC 0.20 /100 WBC    Neutrophils (Absolute) 8.40 (H) 1.82 - 7.42 K/uL    Lymphs (Absolute) 0.43 (L) 1.00 - 4.80 K/uL    Monos (Absolute) 0.78 0.00 - 0.85 K/uL    Eos (Absolute) 0.02 0.00 - 0.51 K/uL    Baso (Absolute) 0.04 0.00 - 0.12 K/uL    Immature Granulocytes (abs) 0.06 0.00 - 0.11 K/uL    NRBC (Absolute) 0.02 K/uL    Hypochromia 1+     Anisocytosis 1+     Macrocytosis 1+    Complete Metabolic Panel (CMP)   Result Value Ref Range    Sodium 132 (L) 135 - 145 mmol/L    Potassium 7.1 (HH) 3.6 - 5.5 mmol/L    Chloride 93 (L) 96 - 112 mmol/L    Co2 18 (L) 20 - 33 mmol/L    Anion Gap 21.0 (H) 0.0 - 11.9    Glucose 101 (H) 65 - 99 mg/dL    Bun 94 (HH) 8 - 22 mg/dL    Creatinine 12.35 (HH) 0.50 - 1.40 mg/dL    Calcium 9.6 8.5 - 10.5 mg/dL    AST(SGOT) 132 (H) 12 - 45 U/L    ALT(SGPT) 125 (H) 2 - 50 U/L    Alkaline Phosphatase 178 (H) 30 - 99 U/L    Total Bilirubin 0.8 0.1 - 1.5 mg/dL    Albumin 3.7 3.2 - 4.9 g/dL    Total Protein 7.0 6.0 - 8.2 g/dL    Globulin 3.3 1.9 - 3.5 g/dL    A-G Ratio 1.1 g/dL   Troponin STAT   Result Value Ref Range    Troponin T 207 (H) 6 - 19 ng/L   ESTIMATED GFR   Result Value Ref Range    GFR If African American 5 (A) >60 mL/min/1.73 m 2    GFR If Non African American 4 (A) >60 mL/min/1.73 m 2    MORPHOLOGY   Result Value Ref Range    RBC Morphology Present     Polychromia 1+     Poikilocytosis 1+     Ovalocytes 1+    PERIPHERAL SMEAR REVIEW   Result Value Ref Range    Peripheral Smear Review see below    PLATELET ESTIMATE   Result Value Ref Range    Plt Estimation Normal    DIFFERENTIAL COMMENT   Result Value Ref Range    Comments-Diff see below    EKG   Result Value Ref Range    Report       [facility] Emergency Dept.    Test Date:  2019  Pt Name:    EDVIN GREEN               Department: ER  MRN:        [id number]                      Room:       [facility]  Gender:     Male                         Technician: [id number]  :        [id number]                   Requested By:VINAYAK BARKLEY  Order #:    [id number]                    Reading MD: VINAYAK BARKLEY MD    Measurements  Intervals                                Axis  Rate:       86                           P:          39  MS:         192                          QRS:        -65  QRSD:       152                          T:          79  QT:         392  QTc:        469    Interpretive Statements  SINUS RHYTHM  ATRIAL PREMATURE COMPLEX  RIGHT BUNDLE BRANCH BLOCK  Compared to ECG 07/15/2019 07:33:49  Atrial premature complex(es) now present      Electronically Signed On 2019 1:54:21 PDT by VINAYAK BARKLEY MD         All labs and 12 Lead EKG reviewed by me.      RADIOLOGY  DX-CHEST-PORTABLE (1 VIEW)   Final Result      1.  Stable cardiomegaly.   2.  No acute abnormality.        The radiologist's interpretation of all radiological studies have been reviewed by me.    COURSE & MEDICAL DECISION MAKING  Pertinent Labs & Imaging studies reviewed. (See chart for details)    10:35 PM - Patient seen and examined at bedside. Patient will be treated with Solu-Medrol 125 mg and Duoneb 6 mL. Ordered EKG, Dx-chest, CBC with differential, CMP and troponin to evaluate his symptoms. The differential diagnoses include but are not  limited to: COPD exacerbation, volume overload, myocardial infarction, pneumonia and CHF     11:21 PM Ordered estimated GFR, morphology, peripheral smear review, platelet estimate, differential comment    11:45 PM Ordered EKG    12:27 PM Patient will be treated with Proventil 2.5 mg/0.5 mL nebulizer solution 5 mg, Humulin injection 10 units, Dextrose 10% bolus 250 mL and calcium gluconate 1,000 mg in  mL.     12:39 AM Recheck. Patient re-evaluated at [facility]. Patient reports that he still short of breath.  Patient's lab and radiology results discussed. He was informed that his Potassium and Troponin are elevated. Discussed patient's condition and treatment plan.The patient understood and is in agreement.     12:41 PM Dr. Larson (Nephrology) was paged at this time.     12:53 PM Dr. Shetty (Hospitalist) was paged at this time.    12:55 PM  I discussed the patient's case and the above findings with Dr. Larson (Nephrology) who will have the patient undergo dialysis tonight before he is admitted.     1:01 PM I discussed the patient's case and the above findings with Dr. Shetty (Hospitalist) who agrees to admit the patient.     CRITICAL CARE  I provided critical care services, which included medication orders, frequent reevaluations of the patient's condition and response to treatment, ordering and reviewing test results, and discussing the case with various consultants.  The critical care time associated with the care of the patient was 32 minutes. Review chart for interventions. This time is exclusive of any other billable procedures.         Medical Decision Making: At this point time I think patient has acute respiratory distress secondary to COPD exacerbation with slightly elevated troponin.  This may be also cardiac in nature.  Patient is also hyperkalemic therefore is given medications to shift his potassium and will undergo dialysis tonight.  Patient was given multiple breathing treatments to help with both  his breathing as well as his hyperkalemia    DISPOSITION:  Patient will be admitted to Dr. Shetty (Hospitalist) in guarded condition.       FINAL IMPRESSION  1. Acute respiratory distress    2. Acute exacerbation of chronic obstructive pulmonary disease (COPD) (HCC)    3. Hyperkalemia    4. Dialysis patient (HCC)          Mami ALONZO (Scribe), am scribing for, and in the presence of, Federico Hartley M.D.    Electronically signed by: Mami Bean (Scribe), 7/28/2019    Federico ALONZO M.D. personally performed the services described in this documentation, as scribed by Mami Bean in my presence, and it is both accurate and complete.    C    The note accurately reflects work and decisions made by me.  Federico Hartley  7/29/2019  1:55 AM

## 2019-07-29 NOTE — PROGRESS NOTES
Patient admitted with known history of BPH, end-stage renal disease on dialysis, dyslipidemia presents with increasing shortness of breath and significant hyperkalemia of 7 requiring emergent hemodialysis.  Troponin T, 8 BNP are both elevated.  We will continue cardiac monitoring.  Likely secondary to end-stage renal disease.  Chest x-ray with no acute findings.    Reports increasing abdominal girth and discomfort x 1 day.  Last BM yesterday, + BS  F/u abd xray  F/u labs, s/p HD  Continue current management.  Nephrology following.

## 2019-07-29 NOTE — DISCHARGE PLANNING
Outpatient Dialysis Note    Confirmed patient is active at:    [facility], [place]  [address]   [place] [address]      Schedule: Monday, Wednesday, Friday  Time: 1:30 pm    Spoke with Jazmin at facility who confirmed.    Forwarded records for review.    Dialysis Coordinator, [facility]  Dawna Santa [phone]

## 2019-07-29 NOTE — CARE PLAN
Problem: Infection  Goal: Will remain free from infection  Outcome: PROGRESSING AS EXPECTED  Educated patient on the importance of good hand hygiene for self and staff,verbalized understanding.

## 2019-07-30 ENCOUNTER — APPOINTMENT (OUTPATIENT)
Dept: RADIOLOGY | Facility: MEDICAL CENTER | Age: 79
DRG: 628 | End: 2019-07-30
Attending: FAMILY MEDICINE
Payer: MEDICARE

## 2019-07-30 ENCOUNTER — APPOINTMENT (OUTPATIENT)
Dept: RADIOLOGY | Facility: MEDICAL CENTER | Age: 79
DRG: 628 | End: 2019-07-30
Attending: INTERNAL MEDICINE
Payer: MEDICARE

## 2019-07-30 LAB
ANION GAP SERPL CALC-SCNC: 25 MMOL/L (ref 0–11.9)
BASOPHILS # BLD AUTO: 0.2 % (ref 0–1.8)
BASOPHILS # BLD: 0.02 K/UL (ref 0–0.12)
BUN SERPL-MCNC: 90 MG/DL (ref 8–22)
CALCIUM SERPL-MCNC: 9.5 MG/DL (ref 8.5–10.5)
CHLORIDE SERPL-SCNC: 93 MMOL/L (ref 96–112)
CO2 SERPL-SCNC: 15 MMOL/L (ref 20–33)
CREAT SERPL-MCNC: 10.01 MG/DL (ref 0.5–1.4)
EKG IMPRESSION: NORMAL
EOSINOPHIL # BLD AUTO: 0 K/UL (ref 0–0.51)
EOSINOPHIL NFR BLD: 0 % (ref 0–6.9)
ERYTHROCYTE [DISTWIDTH] IN BLOOD BY AUTOMATED COUNT: 55.7 FL (ref 35.9–50)
GLUCOSE SERPL-MCNC: 101 MG/DL (ref 65–99)
HCT VFR BLD AUTO: 29.7 % (ref 42–52)
HGB BLD-MCNC: 9.2 G/DL (ref 14–18)
IMM GRANULOCYTES # BLD AUTO: 0.07 K/UL (ref 0–0.11)
IMM GRANULOCYTES NFR BLD AUTO: 0.7 % (ref 0–0.9)
INR PPP: 2.81 (ref 0.87–1.13)
LYMPHOCYTES # BLD AUTO: 0.22 K/UL (ref 1–4.8)
LYMPHOCYTES NFR BLD: 2.3 % (ref 22–41)
MCH RBC QN AUTO: 30.8 PG (ref 27–33)
MCHC RBC AUTO-ENTMCNC: 31 G/DL (ref 33.7–35.3)
MCV RBC AUTO: 99.3 FL (ref 81.4–97.8)
MONOCYTES # BLD AUTO: 0.41 K/UL (ref 0–0.85)
MONOCYTES NFR BLD AUTO: 4.3 % (ref 0–13.4)
NEUTROPHILS # BLD AUTO: 8.9 K/UL (ref 1.82–7.42)
NEUTROPHILS NFR BLD: 92.5 % (ref 44–72)
NRBC # BLD AUTO: 0 K/UL
NRBC BLD-RTO: 0 /100 WBC
PLATELET # BLD AUTO: 188 K/UL (ref 164–446)
PMV BLD AUTO: 10.8 FL (ref 9–12.9)
POTASSIUM SERPL-SCNC: 6.1 MMOL/L (ref 3.6–5.5)
POTASSIUM SERPL-SCNC: 6.9 MMOL/L (ref 3.6–5.5)
PROTHROMBIN TIME: 30.7 SEC (ref 12–14.6)
RBC # BLD AUTO: 2.99 M/UL (ref 4.7–6.1)
SODIUM SERPL-SCNC: 133 MMOL/L (ref 135–145)
WBC # BLD AUTO: 9.6 K/UL (ref 4.8–10.8)

## 2019-07-30 PROCEDURE — 36415 COLL VENOUS BLD VENIPUNCTURE: CPT

## 2019-07-30 PROCEDURE — 770020 HCHG ROOM/CARE - TELE (206)

## 2019-07-30 PROCEDURE — 93005 ELECTROCARDIOGRAM TRACING: CPT | Performed by: HOSPITALIST

## 2019-07-30 PROCEDURE — A9270 NON-COVERED ITEM OR SERVICE: HCPCS | Performed by: HOSPITALIST

## 2019-07-30 PROCEDURE — 74176 CT ABD & PELVIS W/O CONTRAST: CPT

## 2019-07-30 PROCEDURE — 80048 BASIC METABOLIC PNL TOTAL CA: CPT

## 2019-07-30 PROCEDURE — 84132 ASSAY OF SERUM POTASSIUM: CPT

## 2019-07-30 PROCEDURE — 93990 DOPPLER FLOW TESTING: CPT | Mod: 26 | Performed by: INTERNAL MEDICINE

## 2019-07-30 PROCEDURE — 93990 DOPPLER FLOW TESTING: CPT

## 2019-07-30 PROCEDURE — 90935 HEMODIALYSIS ONE EVALUATION: CPT | Performed by: INTERNAL MEDICINE

## 2019-07-30 PROCEDURE — 90935 HEMODIALYSIS ONE EVALUATION: CPT

## 2019-07-30 PROCEDURE — 99233 SBSQ HOSP IP/OBS HIGH 50: CPT | Performed by: HOSPITALIST

## 2019-07-30 PROCEDURE — 85025 COMPLETE CBC W/AUTO DIFF WBC: CPT

## 2019-07-30 PROCEDURE — 85610 PROTHROMBIN TIME: CPT

## 2019-07-30 PROCEDURE — 700111 HCHG RX REV CODE 636 W/ 250 OVERRIDE (IP): Performed by: INTERNAL MEDICINE

## 2019-07-30 PROCEDURE — 700102 HCHG RX REV CODE 250 W/ 637 OVERRIDE(OP): Performed by: HOSPITALIST

## 2019-07-30 PROCEDURE — 93010 ELECTROCARDIOGRAM REPORT: CPT | Performed by: INTERNAL MEDICINE

## 2019-07-30 RX ORDER — CALCIUM CHLORIDE 100 MG/ML
1 INJECTION INTRAVENOUS; INTRAVENTRICULAR ONCE
Status: DISCONTINUED | OUTPATIENT
Start: 2019-07-30 | End: 2019-07-30

## 2019-07-30 RX ORDER — SODIUM POLYSTYRENE SULFONATE 15 G/60ML
15 SUSPENSION ORAL; RECTAL EVERY 4 HOURS
Status: DISCONTINUED | OUTPATIENT
Start: 2019-07-30 | End: 2019-07-30

## 2019-07-30 RX ORDER — WARFARIN SODIUM 2.5 MG/1
2.5 TABLET ORAL
Status: DISCONTINUED | OUTPATIENT
Start: 2019-07-30 | End: 2019-07-31

## 2019-07-30 RX ADMIN — TAMSULOSIN HYDROCHLORIDE 0.8 MG: 0.4 CAPSULE ORAL at 05:16

## 2019-07-30 RX ADMIN — WARFARIN SODIUM 2.5 MG: 2.5 TABLET ORAL at 23:54

## 2019-07-30 RX ADMIN — Medication 2001 MG: at 13:35

## 2019-07-30 RX ADMIN — OMEPRAZOLE 40 MG: 20 CAPSULE, DELAYED RELEASE ORAL at 05:16

## 2019-07-30 RX ADMIN — Medication 2001 MG: at 18:23

## 2019-07-30 RX ADMIN — HEPARIN SODIUM 1500 UNITS: 1000 INJECTION, SOLUTION INTRAVENOUS; SUBCUTANEOUS at 07:58

## 2019-07-30 RX ADMIN — AMIODARONE HYDROCHLORIDE 200 MG: 200 TABLET ORAL at 05:16

## 2019-07-30 ASSESSMENT — ENCOUNTER SYMPTOMS
STRIDOR: 0
CHILLS: 0
WEAKNESS: 0
WHEEZING: 0
FOCAL WEAKNESS: 0
EYE REDNESS: 0
FLANK PAIN: 0
SHORTNESS OF BREATH: 1
HALLUCINATIONS: 0
ABDOMINAL PAIN: 0
FEVER: 0
DIARRHEA: 0
BACK PAIN: 0
DIAPHORESIS: 0
EYE DISCHARGE: 0
PALPITATIONS: 0
TREMORS: 0
SENSORY CHANGE: 0
NECK PAIN: 0
COUGH: 0
VOMITING: 0
SPEECH CHANGE: 0

## 2019-07-30 ASSESSMENT — LIFESTYLE VARIABLES: SUBSTANCE_ABUSE: 0

## 2019-07-30 NOTE — PROGRESS NOTES
[facility] Medicine Daily Progress Note    Date of Service  7/30/2019    Chief Complaint  79 y.o. male admitted 7/28/2019 with shortness of breath.     Hospital Course    80 yo male hx ESRD on HD, dysipidiemia, admitted volume overload and hyperkalemia.  Started on emergent HD.    Interval Problem Update    Post HD yesterday - Despite this remains hyperkalemic and acidotic.   This morning K increased to 6.9.     Consultants/Specialty  Dr. Guerra, nephrology     Code Status  Full     Disposition  Anticipate dc home when stable.     Review of Systems  Review of Systems   Constitutional: Positive for malaise/fatigue. Negative for chills, diaphoresis and fever.   HENT: Negative for congestion.    Eyes: Negative for discharge and redness.   Respiratory: Positive for shortness of breath. Negative for cough, wheezing and stridor.    Cardiovascular: Negative for chest pain, palpitations and leg swelling.   Gastrointestinal: Negative for abdominal pain, diarrhea and vomiting.   Genitourinary: Negative for flank pain and hematuria.   Musculoskeletal: Negative for back pain, joint pain and neck pain.   Neurological: Negative for tremors, sensory change, speech change, focal weakness and weakness.   Psychiatric/Behavioral: Negative for hallucinations and substance abuse.        Physical Exam  Temp:  [36.1 °C (97 °F)-36.5 °C (97.7 °F)] 36.1 °C (97 °F)  Pulse:  [62-91] 67  Resp:  [16-20] 16  BP: ()/(41-57) 90/44  SpO2:  [95 %-100 %] 97 %    Physical Exam   Constitutional: He is oriented to person, place, and time. No distress.   HENT:   Head: Normocephalic and atraumatic.   Nose: Nose normal.   Eyes: Conjunctivae and EOM are normal. No scleral icterus.   Neck: Normal range of motion. No JVD present.   Cardiovascular: Normal rate and regular rhythm.    No murmur heard.  Pulmonary/Chest: No stridor. He has no wheezes. He has no rales.   Abdominal: Soft. He exhibits no distension. There is no tenderness.   Musculoskeletal: He  exhibits no edema or tenderness.   Left upper ext Fistula with strong thrill.    Neurological: He is alert and oriented to person, place, and time. No cranial nerve deficit.   Skin: Skin is warm and dry. He is not diaphoretic. No pallor.   Psychiatric: He has a normal mood and affect. His behavior is normal.   Vitals reviewed.      Fluids  No intake or output data in the 24 hours ending 07/30/19 0755    Laboratory  Recent Labs      07/28/19 2321 07/30/19   0505   WBC  9.7  9.6   RBC  2.83*  2.99*   HEMOGLOBIN  8.6*  9.2*   HEMATOCRIT  29.6*  29.7*   MCV  104.6*  99.3*   MCH  30.4  30.8   MCHC  29.1*  31.0*   RDW  58.9*  55.7*   PLATELETCT  228  188   MPV  10.4  10.8     Recent Labs      07/28/19 2321 07/29/19   1139  07/30/19   0505   SODIUM  132*  136  133*   POTASSIUM  7.1*  5.8*  6.9*   CHLORIDE  93*  95*  93*   CO2  18*  17*  15*   GLUCOSE  101*  172*  101*   BUN  94*  76*  90*   CREATININE  12.35*  9.53*  10.01*   CALCIUM  9.6  9.4  9.5     Recent Labs      07/28/19 2321 07/30/19   0506   INR  2.16*  2.81*               Imaging  US-HEMODIALYSIS GRAFT DUPLEX COMP UPPER EXTREMITY   Final Result      [id number] VIEW   Final Result      1.  No evidence of bowel obstruction.   2.  Abdominal aortic and mesenteric vascular calcifications.      DX-CHEST-PORTABLE (1 VIEW)   Final Result      1.  Stable cardiomegaly.   2.  No acute abnormality.           Assessment/Plan  * Hyperkalemia, diminished renal excretion- (present on admission)   Assessment & Plan    Post emergent hemodialysis per  Dr. Larson with persistent hyperkalemia  Start IV D50, insulin, Kayexalate  Monitor telemetry for arrhythmias  Follow-up BMP  HD per nephrology     Essential hypertension- (present on admission)   Assessment & Plan    Controlled with current medication regimen.  Monitor.      Dyslipidemia- (present on admission)   Assessment & Plan    On statin therapy.  Monitor.      Paroxysmal atrial fibrillation (HCC)   Assessment &  Plan    Controlled  Continue amiodarone, Toprol-XL for rate control.  INR therapeutic-continue warfarin, monitor coagulation studies     ESRD (end stage renal disease) on dialysis ([facility])- (present on admission)   Assessment & Plan    Post emergent hemodialysis 7/29/2019.  Follow-up potassium 6.9.  IV D50, insulin.  Oral Kayexalate.  Follow-up K level.  Continue torsemide diuretic.  Will need additional  hemodialysis per nephrology.     Anemia in CKD (chronic kidney disease)- (present on admission)   Assessment & Plan    Stable.  No current evidence of bleed. Transfuse if needed for hemoglobin less than 7 gm/dL       COPD (chronic obstructive pulmonary disease) ([facility])- (present on admission)   Assessment & Plan    Without current exacerbation.  Monitor.  Respiratory therapy as needed.   Continue scheduled Flovent  As needed Xopenex with scheduled Ellipta       BPH (benign prostatic hypertrophy)- (present on admission)   Assessment & Plan    Continue with Flomax.  Monitor for urinary retention symptoms.          VTE prophylaxis: warfarin     Discussed with multidisciplinary team plan of care.

## 2019-07-30 NOTE — PROGRESS NOTES
Inpatient Anticoagulation Service Note    Date: 7/30/2019    Reason for Anticoagulation: Atrial Fibrillation   Target INR: 2.0 to 3.0  [id number] VASc Score: 4  HAS-BLED Score: 3   Hemoglobin Value: (!) 9.2  Hematocrit Value: (!) 29.7    INR from last 7 days     Date/Time INR Value    07/30/19 0506 (!)  2.81    07/28/19 2321 (!)  2.16        Dose from last 7 days     Date/Time Dose (mg)    07/30/19 0935  2.5    07/29/19 0331  5        Average Dose (mg): 5  Significant Interactions: Amiodarone, Statin, Proton Pump Inhibitor     Comments: Home warfarin for afib.  INR increased significantly after taking home dose (per [facility] recent note).  No s/sx of bleeding.  Hyperkalemia noted, managed with ESRD.  Will decrease dose to 2.5mg and monitor INR in AM.    Plan:  Warfarin 2.5mg. INR in AM.  Education Material Provided?: No (chronic warfarin patient)  Pharmacist suggested discharge dosing: Warfarin 2.5mg MWF and 5mg all other days with follow up within 72hrs of discharge.     Tarik Mancini

## 2019-07-30 NOTE — PROGRESS NOTES
Chart Check Complete    Monitor Summary:    Rhythm- SR   Rate- 58-87  Ectopy- na  Measurements- 0.24/0.12/0.46

## 2019-07-30 NOTE — PROGRESS NOTES
[facility] Progress Note      HD ordered by Dr. Guerra. Treatment started at 0803 and ended at 1103.     Total Net UF 1500mL.    Pt tolerated treatment well with no issues. See paper flow sheet for details. Cannulation needles taken out, held pressure for 10 min and placed gauze dressing with no bleeding. ANU AVF + for bruit/thrill. Report given to ESPERANZA Montero RN.

## 2019-07-30 NOTE — RESPIRATORY CARE
COPD EDUCATION by COPD CLINICAL EDUCATOR  7/30/2019 at 7:19 AM by Uyen Latham     Patient's chart reviewed by COPD education team. Patient does not have an order for Respiratory Care Protocol, therefore the COPD education team cannot treat.

## 2019-07-30 NOTE — PROGRESS NOTES
Pt continues to c/o being light headed. Pt up in chair, does not want to return to bed. Wife at bedside.

## 2019-07-31 ENCOUNTER — APPOINTMENT (OUTPATIENT)
Dept: RADIOLOGY | Facility: MEDICAL CENTER | Age: 79
DRG: 628 | End: 2019-07-31
Attending: INTERNAL MEDICINE
Payer: MEDICARE

## 2019-07-31 ENCOUNTER — APPOINTMENT (OUTPATIENT)
Dept: RADIOLOGY | Facility: MEDICAL CENTER | Age: 79
DRG: 628 | End: 2019-07-31
Attending: HOSPITALIST
Payer: MEDICARE

## 2019-07-31 ENCOUNTER — APPOINTMENT (OUTPATIENT)
Dept: CARDIOLOGY | Facility: MEDICAL CENTER | Age: 79
DRG: 628 | End: 2019-07-31
Attending: HOSPITALIST
Payer: MEDICARE

## 2019-07-31 ENCOUNTER — APPOINTMENT (OUTPATIENT)
Dept: CARDIOLOGY | Facility: MEDICAL CENTER | Age: 79
DRG: 628 | End: 2019-07-31
Attending: NURSE PRACTITIONER
Payer: MEDICARE

## 2019-07-31 PROBLEM — I31.4 PERICARDIAL TAMPONADE: Status: ACTIVE | Noted: 2019-07-31

## 2019-07-31 LAB
ANION GAP SERPL CALC-SCNC: 28 MMOL/L (ref 0–11.9)
BUN SERPL-MCNC: 94 MG/DL (ref 8–22)
CALCIUM SERPL-MCNC: 9.4 MG/DL (ref 8.5–10.5)
CHLORIDE SERPL-SCNC: 92 MMOL/L (ref 96–112)
CO2 SERPL-SCNC: 15 MMOL/L (ref 20–33)
CREAT SERPL-MCNC: 9.16 MG/DL (ref 0.5–1.4)
GLUCOSE SERPL-MCNC: 65 MG/DL (ref 65–99)
INR PPP: 3.02 (ref 0.87–1.13)
INR PPP: 3.42 (ref 0.87–1.13)
LV EJECT FRACT MOD 2C 99903: 26.39
LV EJECT FRACT MOD 4C 99902: 53.59
LV EJECT FRACT MOD BP 99901: 41.66
POTASSIUM SERPL-SCNC: 5.3 MMOL/L (ref 3.6–5.5)
POTASSIUM SERPL-SCNC: 6.4 MMOL/L (ref 3.6–5.5)
PROTHROMBIN TIME: 32.4 SEC (ref 12–14.6)
PROTHROMBIN TIME: 35.8 SEC (ref 12–14.6)
SODIUM SERPL-SCNC: 135 MMOL/L (ref 135–145)

## 2019-07-31 PROCEDURE — 93321 DOPPLER ECHO F-UP/LMTD STD: CPT | Mod: 26 | Performed by: INTERNAL MEDICINE

## 2019-07-31 PROCEDURE — P9017 PLASMA 1 DONOR FRZ W/IN 8 HR: HCPCS

## 2019-07-31 PROCEDURE — 99233 SBSQ HOSP IP/OBS HIGH 50: CPT | Mod: 25 | Performed by: HOSPITALIST

## 2019-07-31 PROCEDURE — B548ZZA ULTRASONOGRAPHY OF SUPERIOR VENA CAVA, GUIDANCE: ICD-10-PCS | Performed by: HOSPITALIST

## 2019-07-31 PROCEDURE — 90935 HEMODIALYSIS ONE EVALUATION: CPT | Performed by: INTERNAL MEDICINE

## 2019-07-31 PROCEDURE — 85610 PROTHROMBIN TIME: CPT | Mod: 91

## 2019-07-31 PROCEDURE — 700102 HCHG RX REV CODE 250 W/ 637 OVERRIDE(OP): Performed by: HOSPITALIST

## 2019-07-31 PROCEDURE — 93325 DOPPLER ECHO COLOR FLOW MAPG: CPT | Mod: 26 | Performed by: INTERNAL MEDICINE

## 2019-07-31 PROCEDURE — 700111 HCHG RX REV CODE 636 W/ 250 OVERRIDE (IP): Performed by: NURSE PRACTITIONER

## 2019-07-31 PROCEDURE — 36430 TRANSFUSION BLD/BLD COMPNT: CPT

## 2019-07-31 PROCEDURE — 700111 HCHG RX REV CODE 636 W/ 250 OVERRIDE (IP)

## 2019-07-31 PROCEDURE — 71045 X-RAY EXAM CHEST 1 VIEW: CPT

## 2019-07-31 PROCEDURE — C9132 KCENTRA, PER I.U.: HCPCS | Mod: JG | Performed by: NURSE PRACTITIONER

## 2019-07-31 PROCEDURE — 36556 INSERT NON-TUNNEL CV CATH: CPT

## 2019-07-31 PROCEDURE — 700105 HCHG RX REV CODE 258: Performed by: NURSE PRACTITIONER

## 2019-07-31 PROCEDURE — 02HV33Z INSERTION OF INFUSION DEVICE INTO SUPERIOR VENA CAVA, PERCUTANEOUS APPROACH: ICD-10-PCS | Performed by: HOSPITALIST

## 2019-07-31 PROCEDURE — 84132 ASSAY OF SERUM POTASSIUM: CPT

## 2019-07-31 PROCEDURE — 30233K1 TRANSFUSION OF NONAUTOLOGOUS FROZEN PLASMA INTO PERIPHERAL VEIN, PERCUTANEOUS APPROACH: ICD-10-PCS | Performed by: HOSPITALIST

## 2019-07-31 PROCEDURE — 770020 HCHG ROOM/CARE - TELE (206)

## 2019-07-31 PROCEDURE — A9270 NON-COVERED ITEM OR SERVICE: HCPCS | Performed by: HOSPITALIST

## 2019-07-31 PROCEDURE — 36556 INSERT NON-TUNNEL CV CATH: CPT | Performed by: HOSPITALIST

## 2019-07-31 PROCEDURE — 93308 TTE F-UP OR LMTD: CPT | Mod: 26 | Performed by: INTERNAL MEDICINE

## 2019-07-31 PROCEDURE — 80048 BASIC METABOLIC PNL TOTAL CA: CPT

## 2019-07-31 PROCEDURE — 700101 HCHG RX REV CODE 250

## 2019-07-31 PROCEDURE — 90935 HEMODIALYSIS ONE EVALUATION: CPT

## 2019-07-31 PROCEDURE — 99223 1ST HOSP IP/OBS HIGH 75: CPT | Performed by: INTERNAL MEDICINE

## 2019-07-31 PROCEDURE — 93325 DOPPLER ECHO COLOR FLOW MAPG: CPT

## 2019-07-31 RX ORDER — HEPARIN SODIUM 1000 [USP'U]/ML
INJECTION, SOLUTION INTRAVENOUS; SUBCUTANEOUS
Status: COMPLETED
Start: 2019-07-31 | End: 2019-07-31

## 2019-07-31 RX ORDER — LIDOCAINE HYDROCHLORIDE 10 MG/ML
INJECTION, SOLUTION INFILTRATION; PERINEURAL
Status: COMPLETED
Start: 2019-07-31 | End: 2019-07-31

## 2019-07-31 RX ADMIN — LIDOCAINE HYDROCHLORIDE 1 ML: 10 INJECTION, SOLUTION INFILTRATION; PERINEURAL at 07:45

## 2019-07-31 RX ADMIN — OMEPRAZOLE 40 MG: 20 CAPSULE, DELAYED RELEASE ORAL at 06:33

## 2019-07-31 RX ADMIN — Medication 2001 MG: at 18:28

## 2019-07-31 RX ADMIN — PROTHROMBIN, COAGULATION FACTOR VII HUMAN, COAGULATION FACTOR IX HUMAN, COAGULATION FACTOR X HUMAN, PROTEIN C, PROTEIN S HUMAN, AND WATER 1722 UNITS: KIT at 14:47

## 2019-07-31 RX ADMIN — ACETAMINOPHEN 650 MG: 325 TABLET, FILM COATED ORAL at 22:07

## 2019-07-31 RX ADMIN — TAMSULOSIN HYDROCHLORIDE 0.8 MG: 0.4 CAPSULE ORAL at 06:34

## 2019-07-31 RX ADMIN — SENNOSIDES, DOCUSATE SODIUM 2 TABLET: 50; 8.6 TABLET, FILM COATED ORAL at 06:34

## 2019-07-31 RX ADMIN — HEPARIN SODIUM 1500 UNITS: 1000 INJECTION, SOLUTION INTRAVENOUS; SUBCUTANEOUS at 07:55

## 2019-07-31 RX ADMIN — UMECLIDINIUM BROMIDE AND VILANTEROL TRIFENATATE 1 PUFF: 62.5; 25 POWDER RESPIRATORY (INHALATION) at 14:59

## 2019-07-31 RX ADMIN — AMIODARONE HYDROCHLORIDE 200 MG: 200 TABLET ORAL at 06:34

## 2019-07-31 RX ADMIN — SENNOSIDES, DOCUSATE SODIUM 2 TABLET: 50; 8.6 TABLET, FILM COATED ORAL at 18:28

## 2019-07-31 RX ADMIN — OXYCODONE HYDROCHLORIDE 5 MG: 5 TABLET ORAL at 22:07

## 2019-07-31 RX ADMIN — FLUTICASONE PROPIONATE 88 MCG: 44 AEROSOL, METERED RESPIRATORY (INHALATION) at 14:58

## 2019-07-31 RX ADMIN — PHYTONADIONE 10 MG: 10 INJECTION, EMULSION INTRAMUSCULAR; INTRAVENOUS; SUBCUTANEOUS at 14:48

## 2019-07-31 RX ADMIN — Medication 1 ML: at 07:45

## 2019-07-31 ASSESSMENT — ENCOUNTER SYMPTOMS
DIARRHEA: 0
DIAPHORESIS: 0
NERVOUS/ANXIOUS: 1
SENSORY CHANGE: 0
SPEECH CHANGE: 0
FEVER: 0
NECK PAIN: 0
CHILLS: 0
EYE DISCHARGE: 0
PALPITATIONS: 0
BACK PAIN: 0
COUGH: 0
EYE REDNESS: 0
HALLUCINATIONS: 0
WEIGHT LOSS: 0
EYES NEGATIVE: 1
NAUSEA: 0
FOCAL WEAKNESS: 0
DIZZINESS: 0
TREMORS: 0
ORTHOPNEA: 0
VOMITING: 0
HEMOPTYSIS: 0
WEAKNESS: 0
ABDOMINAL PAIN: 0
WHEEZING: 0
SHORTNESS OF BREATH: 1
STRIDOR: 0
SHORTNESS OF BREATH: 0
FLANK PAIN: 0
SINUS PAIN: 0

## 2019-07-31 ASSESSMENT — LIFESTYLE VARIABLES: SUBSTANCE_ABUSE: 0

## 2019-07-31 NOTE — PROGRESS NOTES
HEMODIALYSIS NOTES:     HD today x 3 hours per Dr. Guerra. Initiated at 0750 and ended at 1140. Dialysis time extended due to patient needs STAT FFP transfusion, 1 unit..  Patient alert and oriented  X 4. UF limited as ordered due to patient has pleural effusion per DR. Guerra. As ordered, just make UF even just to correct K+ per Dr. Guerra.     Coordinated with Primary Adrianna RN on patient's monitor : Atrial flutter with HR  120-130's. Close monitoring on patient during dialysis tx. Blood pressure between 80-'s  during dialysis. Patient asymptomatic. BP taken on L leg due to both arm has AVF.     Ordered recheck of K+ lab 2 hours post dialysis per Dr. Guerra.     1054: Blood transfusion consent signed by patient. Explained by Dr. Crum regarding the need for transfusion.    1100: Patient wanted to let the Wife to know everything before any procedure to be done. Coordinated with Primary Adrianna BYRD and Dr. Douglas aware and willing to explain to patient's wife.    1115:  FFP transfused , consent verified and FFP verified as well by two RN's.  No blood transfusion reaction.    Patient tolerated treatment. Please see paper flowsheet for details.    UF Net: 532 mL    Blood returned. Applied gauze and held L AVF site for 7 minutes. Verified no bleeding. Bruit and thrill present post dialysis but soft and weak. R am AVF (-) bruit and (-)thrill. Dr. Guerra aware and ordered for L arm AVF ultrasound.      Instructions given to Primary RN that if bleeding occurs on the AVF site, change dressing and held the site with pressure.     Report given to ESPERANZA Montero RN.

## 2019-07-31 NOTE — PROGRESS NOTES
Report received from Ajit RN. Pt off unit to HD, unable to assess. Pt with CT overnight due to abd pain and discomfort. Bladder scanned with 35ml result.

## 2019-07-31 NOTE — PROCEDURES
Date: 2:00 PM       PROCEDURE: Internal jugular central venous catheter, U/S guided.    INDICATION:  Hypotension   IV access.    PROCEDURE : Yusuf Kaiser M.D.    CONSENT: This was an emergent procedure     Consent was obtained from the patient and wife  prior to the procedure. Indications, risks and benefits were explained at length, and parties agree to  proceed with this procedure.         PROCEDURE SUMMARY:     A time-out was performed. The patient's right anterior  neck region was prepped and draped in sterile fashion using chlorhexidine scrub. Anesthesia was achieved with 1% lidocaine. The right internal jugular vein was identified by ballotment and US doppler. The vessel was thin walled, large caliber pulsatile with respirations . Right IJ then was accessed under ultrasound guidance using a finder needle.   Dark non pulsatile Venous blood was withdrawn .  Syringe removed and  A guidewire was advanced through the sheath. Ultrasound confirmed placement of guidewire inside the jugular vein. A small incision was made with a scalpel and the sheath was exchanged for a dilator over the guidewire until appropriate dilation was obtained. The dilator was removed and a central venous triple-lumen catheter was advanced over the guidewire and advanced into the right IJ.  The guide wire was then removed.  Triple lumen catheter sutured into place.  Ports were placed and flushed.     COMPLICATIONS: None    ESTIMATED BLOOD LOSS: <5ml

## 2019-07-31 NOTE — PROGRESS NOTES
Spoke with Dr. Guerra and verified would rather cancel midline orders and rather have central line placed due AVF on BUE RN made aware.

## 2019-07-31 NOTE — PROGRESS NOTES
[facility] Medicine Daily Progress Note    Date of Service  7/31/2019    Chief Complaint  79 y.o. male admitted 7/28/2019 with shortness of breath.     Hospital Course    78 yo male hx ESRD on HD, dysipidiemia, admitted volume overload and hyperkalemia.  Started on emergent HD.    Interval Problem Update    Hypotensive overnight Sbp 60-70s .CT findings of very larger Pericardial effusion.  Fu Stat  Echo  Confirms large Pericardial Effusion with Evidence of Hemodynamic compromise.  Patient at HD for persistent hyperkalemia.  I had at length discussion with patient and wife findings. Will plan FFP,   Vit K  , K Centra to reverse coagulopathy .  Aflutter.   I consulted cardiology Dr. Crum- to plan pericardiocentesis.  Bp improved down at HD , sbp 110s.     Consultants/Specialty  Dr. Guerra, nephrology     Code Status  Full     Disposition  Anticipate dc home when stable.     Review of Systems  Review of Systems   Constitutional: Negative for chills, diaphoresis, fever and malaise/fatigue.   HENT: Negative for congestion.    Eyes: Negative for discharge and redness.   Respiratory: Positive for shortness of breath. Negative for cough, wheezing and stridor.    Cardiovascular: Negative for chest pain, palpitations and leg swelling.   Gastrointestinal: Negative for abdominal pain, diarrhea and vomiting.   Genitourinary: Negative for flank pain and hematuria.   Musculoskeletal: Negative for back pain, joint pain and neck pain.   Skin: Negative for itching and rash.   Neurological: Negative for dizziness, tremors, sensory change, speech change, focal weakness and weakness.   Psychiatric/Behavioral: Negative for hallucinations and substance abuse. The patient is nervous/anxious.         Physical Exam  Temp:  [35.9 °C (96.7 °F)-36.2 °C (97.1 °F)] 36.1 °C (97 °F)  Pulse:  [] 63  Resp:  [16-18] 17  BP: ()/() 145/100  SpO2:  [92 %-98 %] 95 %    Physical Exam   Constitutional: He is oriented to person, place, and  time. No distress.   HENT:   Head: Normocephalic and atraumatic.   Nose: Nose normal.   Eyes: Conjunctivae and EOM are normal. No scleral icterus.   Neck: Normal range of motion. No JVD present.   Cardiovascular: Exam reveals friction rub.   Distant heart sounds.   Reg irreg   Pulmonary/Chest: No stridor. He has no wheezes. He has no rales.   Abdominal: Soft. He exhibits no distension. There is no tenderness.   Musculoskeletal: He exhibits no edema or tenderness.   Left upper ext Fistula with strong thrill.    Neurological: He is alert and oriented to person, place, and time. No cranial nerve deficit.   Skin: Skin is warm and dry. He is not diaphoretic. No pallor.   Psychiatric: He has a normal mood and affect. His behavior is normal.   Vitals reviewed.      Fluids  No intake or output data in the 24 hours ending 07/31/19 1135    Laboratory  Recent Labs     07/28/19 2321 07/30/19  0505   WBC 9.7 9.6   RBC 2.83* 2.99*   HEMOGLOBIN 8.6* 9.2*   HEMATOCRIT 29.6* 29.7*   .6* 99.3*   MCH 30.4 30.8   MCHC 29.1* 31.0*   RDW 58.9* 55.7*   PLATELETCT 228 188   MPV 10.4 10.8     Recent Labs     07/29/19  1139 07/30/19  0505 07/30/19  1501 07/31/19  0241   SODIUM 136 133*  --  135   POTASSIUM 5.8* 6.9* 6.1* 6.4*   CHLORIDE 95* 93*  --  92*   CO2 17* 15*  --  15*   GLUCOSE 172* 101*  --  65   BUN 76* 90*  --  94*   CREATININE 9.53* 10.01*  --  9.16*   CALCIUM 9.4 9.5  --  9.4     Recent Labs     07/28/19  2321 07/30/19  0506 07/31/19  0241   INR 2.16* 2.81* 3.42*               Imaging  EC-ECHOCARDIOGRAM LTD W/O CONT   Final Result      CT-ABDOMEN-PELVIS W/O   Final Result      No CT evidence of acute inflammatory process in the abdomen or pelvis      Very large pericardial effusion has increased from 2016      Small right pleural effusion has enlarged      Aortic valvular calcifications indicating aortic stenosis. There is also severe coronary disease      Atrophic kidneys with cysts. Hepatic cysts are also seen.       Moderate distal colonic diverticulosis without evidence of diverticulitis      US-HEMODIALYSIS GRAFT DUPLEX COMP UPPER EXTREMITY   Final Result      [id number] VIEW   Final Result      1.  No evidence of bowel obstruction.   2.  Abdominal aortic and mesenteric vascular calcifications.      DX-CHEST-PORTABLE (1 VIEW)   Final Result      1.  Stable cardiomegaly.   2.  No acute abnormality.      CL-PERICARDIOCENTESIS    (Results Pending)        Assessment/Plan  * Pericardial tamponade  Assessment & Plan  Stat echo confirms very large Pericardial effusion with Hemodynamic compromise.  SBP this morning improved 60-to 110s .   I consulted / discussed with Dr. Crum, cardiology - plan  for pericardiocentesis / window.   Reverse coagulopathy with FFP Vit k, K centra  Tx to ICU if SBP declines < 90.     Paroxysmal atrial fibrillation (HCC)  Assessment & Plan  With  A flutter   Holding warfarin for planned intervention   Treat tamponade may help Aflutter. Cardiology input.   Continue amiodarone  Plan reversal of INR for intervention -- risk and benefits of stroke discussed with patient and  -They  would like to proceed.       Essential hypertension- (present on admission)  Assessment & Plan  Unstable BP with hypotensive episodes , likely due to Pericardial tamponade.   Plan intervention per cardiology   Hold antihypertensives.     ESRD (end stage renal disease) on dialysis (HCC)- (present on admission)  Assessment & Plan  Post emergent hemodialysis 7/29/2019 and daily HD with persistent hyperkal 6.4   Plan HD today as tolerated. Caution with BP  Low k, renal diet  Lisinopril stopped.   Hold diuretics for now due to low BP  Discussed with Dr. Guerra, nephrology     Hyperkalemia, diminished renal excretion- (present on admission)  Assessment & Plan  Post emergent hemodialysis per  Dr. Larson with persistent hyperkalemia  Monitor tele  Discussed with Nephro- to assess effectiveness of HD  Ace inh stopped  Low k diet.      Anemia in CKD (chronic kidney disease)- (present on admission)  Assessment & Plan  No symptoms of bleeding.   Fu Hgb   Transfuse if needed for hemoglobin less than 7 gm/dL      Dyslipidemia- (present on admission)  Assessment & Plan  On statin therapy.  Monitor.     COPD (chronic obstructive pulmonary disease) (HCC)- (present on admission)  Assessment & Plan  Without current exacerbation.   Respiratory therapy as needed.   Continue scheduled Flovent  As needed Xopenex with scheduled Ellipta  Follow clinically       BPH (benign prostatic hypertrophy)- (present on admission)  Assessment & Plan  Continue with Flomax.  Monitor for urinary retention symptoms.       VTE prophylaxis: warfarin     Discussed with wife, Nephro , cardiology plan of care.

## 2019-07-31 NOTE — PROGRESS NOTES
Pt needing STAT IV anticoagulation reversal agents. Pt has no PIV and BUE fistulas in the forearms. Two attempts made to reach Nephrology to request midline, but no answer. Message left. Will continue to attempt. Call made to HD, Dr Guerra's cell obtained. Okay received to place midline or central lline. Order placed.

## 2019-07-31 NOTE — DISCHARGE PLANNING
Care Transition Team Assessment    In the case of an emergency, pt's NOK is Yaquelin Castelan (Wife) [phone].     This RNCM spoke with pt at bedside and obtained the information used in this assessment. Pt verified accuracy of facesheet. Pt lives in a single story house with his wife. Pt uses the [facility] pharmacy on [facility]. Prior to current hospitalization, pt was completely independent with ADLS/IADLS. Pt drives his car to and from his doctors [place]. Pt does not have any financial concerns at this time. Pt denies hx of SA or MH. Pt's wife has been at bedside with him every single day and providing amazing support for the patient. Pt has been working on AD but has not completed them, refusing aditional packet at this time. Pt's plan is to discharge home once medically cleared.     Upon D/C, pt states that his wife, Yaquelin will provide transport home, if applicable.     Information Source  Orientation : Oriented x 4  Information Given By: Patient  Informant's Name: Ronny  Who is responsible for making decisions for patient? : Patient    Readmission Evaluation  Is this a readmission?: No    Elopement Risk  Legal Hold: No  Ambulatory or Self Mobile in Wheelchair: Yes  Disoriented: No  Psychiatric Symptoms: None  History of Wandering: No  Elopement this Admit: No  Vocalizing Wanting to Leave: No  Displays Behaviors, Body Language Wanting to Leave: No-Not at Risk for Elopement  Elopement Risk: Not at Risk for Elopement    Interdisciplinary Discharge Planning  Primary Care Physician: MAURA KRUGER M.D.  Lives with - Patient's Self Care Capacity: Spouse  Patient or legal guardian wants to designate a caregiver (see row info): No  Support Systems: Spouse / Significant Other  Housing / Facility: 1 Story House  Do You Take your Prescribed Medications Regularly: Yes  Able to Return to Previous ADL's: Future Time w/Therapy  Mobility Issues: No  Prior Services: None  Patient Expects to be Discharged to::  Home  Assistance Needed: Unknown at this Time  Durable Medical Equipment: Not Applicable    Discharge Preparedness  What is your plan after discharge?: Home with help  What are your discharge supports?: Spouse  Prior Functional Level: Ambulatory, Drives Self, Independent with Activities of Daily Living, Independent with Medication Management  Difficulity with ADLs: None  Difficulity with IADLs: None    Functional Assesment  Prior Functional Level: Ambulatory, Drives Self, Independent with Activities of Daily Living, Independent with Medication Management    Finances  Financial Barriers to Discharge: No  Prescription Coverage: Yes    Advance Directive  Advance Directive?: None  Advance Directive offered?: AD Booklet refused    Domestic Abuse  Have you ever been the victim of abuse or violence?: No    Psychological Assessment  History of Substance Abuse: None  History of Psychiatric Problems: No  Non-compliant with Treatment: No  Newly Diagnosed Illness: Yes    Discharge Risks or Barriers  Discharge risks or barriers?: No    Anticipated Discharge Information  Anticipated discharge disposition: Home  Discharge Address: [address] [place] [id number]  Discharge Contact Phone Number: [phone]

## 2019-07-31 NOTE — PROGRESS NOTES
Patient returned from CT with transport.  Monitor room notified.  Monitor room confirmed Aflutter.  Patient in bed, bed in lowest position, call light within reach, 2 bed rails up.

## 2019-07-31 NOTE — CARE PLAN
Problem: Safety  Goal: Will remain free from falls  Outcome: PROGRESSING AS EXPECTED  Patient will remain free from falls during shift.    Problem: Venous Thromboembolism (VTW)/Deep Vein Thrombosis (DVT) Prevention:  Goal: Patient will participate in Venous Thrombosis (VTE)/Deep Vein Thrombosis (DVT)Prevention Measures  Outcome: PROGRESSING AS EXPECTED  Patient will not develop DVT during shift.    Problem: Pain Management  Goal: Pain level will decrease to patient's comfort goal  Outcome: PROGRESSING AS EXPECTED  Patient will maintain comfort at rest during shift.

## 2019-07-31 NOTE — PROGRESS NOTES
Inpatient Anticoagulation Service Note    Date: 7/31/2019    Reason for Anticoagulation: Atrial Fibrillation   Target INR: 2.0 to 3.0  [id number] VASc Score: 4  HAS-BLED Score: 3   Hemoglobin Value: (!) 9.2  Hematocrit Value: (!) 29.7    INR from last 7 days     Date/Time INR Value    07/31/19 0241  (!) 3.42    07/30/19 0506  (!) 2.81    07/28/19 2321  (!) 2.16        Dose from last 7 days     Date/Time Dose (mg)    07/31/19 1012  0    07/30/19 0935  2.5    07/29/19 0331  5        Average Dose (mg): 5  Significant Interactions: Amiodarone, Statin, Proton Pump Inhibitor    Comments: INR now supratherapeutic as patient is clearly unable to tolerate previous reported home regimen of 5mg daily.  No s/sx of bleeding with clinical concern for tamponade from recurrent pleural effusions.  Will hold warfarin. INR in AM.    Plan:  Hold warfarin.   Education Material Provided?: No(chronic warfarin patient)  Pharmacist suggested discharge dosing: Resume once therapeutic at 2mg daily with follow up within 72hrs of discharge     Tarik Mancini

## 2019-07-31 NOTE — CONSULTS
DATE OF SERVICE:  07/31/2019    CARDIOLOGY CONSULTATION    REFERRING PHYSICIAN:  Yusuf Douglas MD    CHIEF COMPLAINT:  1.  Pericardial effusion with tamponade.  2.  Chronic anticoagulation therapy.    HISTORY OF PRESENT ILLNESS:  The patient is a 79-year-old male with   past medical history significant for atrial flutter, on warfarin therapy, moderate   aortic stenosis, dilated cardiomyopathy with ejection fraction of 30%,   nonobstructive coronary artery disease, hypertension, hyperlipidemia, COPD,   and end-stage renal disease, on dialysis.    The patient was well until a day and half ago when he began to experience   mild-to-moderate fatigue and mild shortness of breath.  He denied associated   chest pain, dizziness, or syncope.  He was admitted to [facility]   and underwent a CT of the abdomen, which showed large pericardial effusion.  A   stat echocardiogram confirmed the very large pericardial effusion as well as   tamponade physiology.  The patient had hypotension yesterday and is currently   undergoing dialysis.    ALLERGIES:  No known drug allergies.    MEDICATIONS:  Warfarin, which is held; amiodarone 200 mg per day, PhosLo 2000   mg t.i.d., doxazosin 4 mg daily, Toprol- mg daily, Prilosec 40 mg at   bedtime, Pravachol 20 mg at bedtime, Flomax 0.8 mg daily, torsemide 30 mg   daily, Desyrel 300 mg daily.    PAST MEDICAL ILLNESS:  As above plus basal cell carcinoma of the left cheek,   cataracts, history of detached retina, GERD, heartburn, renal cyst, sleep   apnea.    PAST SURGICAL HISTORY:  Peripheral angioplasty, CELIO fistula creation, eye   surgery.    SOCIAL HISTORY:  He is , previous tobacco abuse.    FAMILY HISTORY:  Positive for stroke in his mother, negative for early   coronary artery disease.    REVIEW OF SYSTEMS:  Positive for mild-to-moderate fatigue, mild shortness of   breath, COPD, end-stage renal disease, on dialysis.  GENERAL: The patient denies fever, chills or  weight changes.   HEENT: The patient denies headache, visual or hearing changes.   CENTRAL NERVOUS SYSTEM: The patient denies lightheadedness,   syncope or seizures.   ENDOCRINE: The patient denies thyroid disorder or diabetes.   RESPIRATORY: The patient denies productive cough.  CARDIOVASCULAR: As above.   GASTROINTESTINAL: The patient denies bowel changes.   GENITOURINARY: As above.  PSYCHIATRIC: The patient denies depression or anxiety.   EXTREMITIES: The patient denies arthritis.    PHYSICAL EXAMINATION:  Includes:  VITAL SIGNS:  Pulse 54, respirations 18, BP 71/47, temperature of 36.1.  GENERAL:  A 79-year-old male, in no acute distress.  HEENT:  Head, normocephalic, atraumatic.  Eyes equal, oral moist.  NECK:  Supple.  RESPIRATORY:  Clear to auscultation bilaterally.  Good effort.  CARDIOVASCULAR:  S1, S2, irregular, tachycardic, distant.  ABDOMEN:  Soft, nondistended, nontender.  No hepatosplenomegaly noted.  EXTREMITIES:  Upper extremities, no clubbing, cyanosis.  Lower extremity, no   edema.  NEUROLOGIC:  Alert and oriented x3.  PSYCHIATRIC:  No anxiety or depression.  SKIN:  Warm and dry.    CARDIAC STUDIES AND PROCEDURES:      CTA OF CHEST (07/30/19)  No CT evidence of acute inflammatory process in the abdomen or pelvis  Very large pericardial effusion has increased from 2016  Small right pleural effusion has enlarged  Aortic valvular calcifications indicating aortic stenosis. There is also severe coronary disease  Atrophic kidneys with cysts. Hepatic cysts are also seen.  Moderate distal colonic diverticulosis without evidence of diverticulitis   (study result reviewed)    ECHOCARDIOGRAM CONCLUSIONS (07/31/19)  Large pericardial effusion with evidence of hemodynamic compromise.   Significant inflow variation is noted across the mitral valve. No clear   RA/RV diastolic collapse. IVC is plethoric. Concern for possible tamponade.    (study result reviewed)    ECHOCARDIOGRAM CONCLUSIONS (08/07/18)  Normal  left ventricular chamber size.   Moderate concentric left ventricular hypertrophy (LVPW 1.3 cm).   Mildly reduced left ventricular systolic function.   Left ventricular ejection fraction is visually estimated to be 55%.   Moderate pericardial effusion without evidence of hemodynamic compromise.  Moderate aortic stenosis.  Compared to the images of the prior study done 5/2017-    there has been no significant change in the degree of AS or pericardial effusion. LA size has increased.   (study result reviewed)    EKG performed on (07/30/2019) was reviewed.  EKG shows atrial flutter with 2:1 block and right bundle-branch block.    Laboratory results of (07/31/2019) were reviewed.  BUN of 94 mg/dL and creatinine of 9.16 mg/dL noted.    TRANSESOPHAGEAL ECHOCARDIOGRAM CONCLUSIONS by Harvinder Almonte (06/13/19)  No thrombus detected in the left atrial appendage or other atrial   structures.  Moderately reduced left ventricular systolic function.  Left ventricular ejection fraction is visually estimated to be 30%.  Global hypokinesis.  Diastolic function is difficult to assess with atrial fibrillation.  Reduced right ventricular systolic function.  Mild mitral regurgitation.  Moderate aortic stenosis.  Mild aortic insufficiency.  Moderate pericardial effusion WITHOUT evidence of hemodynamic compromise.  Compared to the images of the transthoracic echocardiogram dated   8/7/2018, the ejection fraction is further reduced previously low   normal.  It is difficult to compare the size of the pericardial   effusion due to technique but it was previously large.  (study result reviewed)    IMPRESSION:  1.  Pericardial effusion with tamponade physiology:  The patient is a   79-year-old male with complex medical history as described above.  He presents   with fatigue and shortness of breath.  He was found to have very large   pericardial effusion with tamponade physiology.  He is also hypotensive.  The   patient will be  scheduled for a pericardiocentesis today.  We will correct his   anticoagulation with FFP prior to the procedure.  2.  Atrial flutter, on chronic anticoagulation (warfarin):  Plan as above.  3.  Aortic stenosis:  Moderate.  4.  Dilated cardiomyopathy:  Stable.  5.  History of hypertension:  He is currently hypotensive due to tamponade.  6.  Hyperlipidemia:  Continue statin therapy.  7.  Peripheral vascular disease:  Stable without claudication.  8.  Right bundle branch block  9.  Chronic obstructive pulmonary disease, on O2 therapy.  10.  End-stage renal disease, on dialysis.    Thank you for this consult.       ____________________________________     MD TANO MULTANI / DELANO    DD:  07/31/2019 11:03:22  DT:  07/31/2019 11:52:21    D#:  [id number]  Job#:  [id number]

## 2019-07-31 NOTE — PROGRESS NOTES
Nephrology/Hemodialysis note    Patient with ESRD/HD admitted with SOB, hyperkalemia    Seen and examined during dialysis, tolerates well  VS stable  With hyperkalemia on 1 K bath  Please see dialysis flow sheet for details

## 2019-07-31 NOTE — PROGRESS NOTES
Nephrology Daily Progress Note    Date of Service  7/31/2019    Chief Complaint  79 y.o. male with ESRD/HD,admitted 7/28/2019 with worsening SOB, hyperkalemia    Interval Problem Update  Found large pericardia effusion -scheduled for pericardiocenthesis  Seen and examined during dialysis -tachycardic, low BP -reduced UP  Persistent hyperkalemia -on 1 K bath    Review of Systems  Review of Systems   Constitutional: Negative for chills, fever, malaise/fatigue and weight loss.   HENT: Negative for congestion, hearing loss and sinus pain.    Eyes: Negative.    Respiratory: Negative for cough, hemoptysis, shortness of breath and wheezing.    Cardiovascular: Negative for chest pain, palpitations, orthopnea and leg swelling.   Gastrointestinal: Negative for nausea and vomiting.   Skin: Negative.    All other systems reviewed and are negative.       Physical Exam  Temp:  [36 °C (96.8 °F)-36.1 °C (97 °F)] 36.1 °C (97 °F)  Pulse:  [] 110  Resp:  [16-18] 16  BP: ()/() 106/85  SpO2:  [89 %-98 %] 89 %    Physical Exam   Constitutional: He appears well-developed and well-nourished. No distress.   HENT:   Head: Normocephalic and atraumatic.   Nose: Nose normal.   Mouth/Throat: Oropharynx is clear and moist.   Eyes: Pupils are equal, round, and reactive to light. Conjunctivae and EOM are normal.   Neck: Normal range of motion. Neck supple. No thyromegaly present.   Cardiovascular: Tachycardia present.   Pulmonary/Chest: Effort normal and breath sounds normal. No respiratory distress. He has no wheezes. He has no rales.   Abdominal: Soft. Bowel sounds are normal. He exhibits no distension and no mass. There is no tenderness. There is no guarding.   Musculoskeletal: He exhibits edema.   Neurological: He is alert. No cranial nerve deficit.   Skin: Skin is warm. No rash noted. No erythema.   Nursing note and vitals reviewed.      Fluids    Intake/Output Summary (Last 24 hours) at 7/31/2019 6427  Last data filed at  7/31/2019 1140  Gross per 24 hour   Intake 748 ml   Output 1032 ml   Net -284 ml       Laboratory  Recent Labs     07/28/19  2321 07/30/19  0505   WBC 9.7 9.6   RBC 2.83* 2.99*   HEMOGLOBIN 8.6* 9.2*   HEMATOCRIT 29.6* 29.7*   .6* 99.3*   MCH 30.4 30.8   MCHC 29.1* 31.0*   RDW 58.9* 55.7*   PLATELETCT 228 188   MPV 10.4 10.8     Recent Labs     07/29/19  1139 07/30/19  0505 07/30/19  1501 07/31/19  0241 07/31/19  1253   SODIUM 136 133*  --  135  --    POTASSIUM 5.8* 6.9* 6.1* 6.4* 5.3   CHLORIDE 95* 93*  --  92*  --    CO2 17* 15*  --  15*  --    GLUCOSE 172* 101*  --  65  --    BUN 76* 90*  --  94*  --    CREATININE 9.53* 10.01*  --  9.16*  --    CALCIUM 9.4 9.5  --  9.4  --      Recent Labs     07/30/19  0506 07/31/19  0241 07/31/19  1308   INR 2.81* 3.42* 3.02*     No results for input(s): NTPROBNP in the last 72 hours.        Imaging  CT reviewed, ECHO reviewed, AVF US reviewed    Assessment/Plan  1.ESRD/HD-daily dialysis  2.Hypotensive -likely due to tamponade  -scheduled for paracenthesis  3.Electrolytes: hyperkalemia -correcting with 1 K bath -low K diet  4.Anemia: Hb stable    Recs; daily BMP             AVF US             Renal/low K diet!             Will reassess for HD needs AM

## 2019-07-31 NOTE — PROGRESS NOTES
MD kumar, concerned about possible tamponade due to pleural effusion. HD phoned to check BP: SBP 90s-100s on LLL. Monitor Tech states Pt in A Flutter 120's.

## 2019-08-01 ENCOUNTER — APPOINTMENT (OUTPATIENT)
Dept: CARDIOLOGY | Facility: MEDICAL CENTER | Age: 79
DRG: 628 | End: 2019-08-01
Attending: INTERNAL MEDICINE
Payer: MEDICARE

## 2019-08-01 ENCOUNTER — APPOINTMENT (OUTPATIENT)
Dept: CARDIOLOGY | Facility: MEDICAL CENTER | Age: 79
DRG: 628 | End: 2019-08-01
Attending: NURSE PRACTITIONER
Payer: MEDICARE

## 2019-08-01 PROBLEM — R57.9 SHOCK (HCC): Status: ACTIVE | Noted: 2019-08-01

## 2019-08-01 LAB
ANION GAP SERPL CALC-SCNC: 28 MMOL/L (ref 0–11.9)
APPEARANCE FLD: NORMAL
BARCODED ABORH UBTYP: 5100
BARCODED ABORH UBTYP: 5100
BARCODED ABORH UBTYP: 7300
BARCODED PRD CODE UBPRD: NORMAL
BARCODED UNIT NUM UBUNT: NORMAL
BASOPHILS # BLD AUTO: 0 % (ref 0–1.8)
BASOPHILS # BLD: 0 K/UL (ref 0–0.12)
BODY FLD TYPE: NORMAL
BUN SERPL-MCNC: 90 MG/DL (ref 8–22)
CALCIUM SERPL-MCNC: 9.5 MG/DL (ref 8.5–10.5)
CHLORIDE SERPL-SCNC: 90 MMOL/L (ref 96–112)
CO2 SERPL-SCNC: 16 MMOL/L (ref 20–33)
COLOR FLD: NORMAL
COMPONENT F 8504F: NORMAL
COMPONENT F 8504F: NORMAL
COMPONENT FT 8504FT: NORMAL
CREAT SERPL-MCNC: 9.36 MG/DL (ref 0.5–1.4)
CSF COMMENTS 1658: NORMAL
EOSINOPHIL # BLD AUTO: 0 K/UL (ref 0–0.51)
EOSINOPHIL NFR BLD: 0 % (ref 0–6.9)
EOSINOPHIL NFR FLD: 1 %
ERYTHROCYTE [DISTWIDTH] IN BLOOD BY AUTOMATED COUNT: 56.4 FL (ref 35.9–50)
GLUCOSE BLD-MCNC: 224 MG/DL (ref 65–99)
GLUCOSE BLD-MCNC: 39 MG/DL (ref 65–99)
GLUCOSE SERPL-MCNC: 70 MG/DL (ref 65–99)
HCT VFR BLD AUTO: 28.9 % (ref 42–52)
HGB BLD-MCNC: 8.9 G/DL (ref 14–18)
IMM GRANULOCYTES # BLD AUTO: 0.08 K/UL (ref 0–0.11)
IMM GRANULOCYTES NFR BLD AUTO: 0.9 % (ref 0–0.9)
INR PPP: 2.35 (ref 0.87–1.13)
LYMPHOCYTES # BLD AUTO: 0.26 K/UL (ref 1–4.8)
LYMPHOCYTES NFR BLD: 2.8 % (ref 22–41)
LYMPHOCYTES NFR FLD: 9 %
MCH RBC QN AUTO: 31.1 PG (ref 27–33)
MCHC RBC AUTO-ENTMCNC: 30.8 G/DL (ref 33.7–35.3)
MCV RBC AUTO: 101 FL (ref 81.4–97.8)
MONOCYTES # BLD AUTO: 0.56 K/UL (ref 0–0.85)
MONOCYTES NFR BLD AUTO: 6 % (ref 0–13.4)
MONONUC CELLS NFR FLD: 3 %
NEUTROPHILS # BLD AUTO: 8.45 K/UL (ref 1.82–7.42)
NEUTROPHILS NFR BLD: 90.3 % (ref 44–72)
NEUTROPHILS NFR FLD: 87 %
NRBC # BLD AUTO: 0.08 K/UL
NRBC BLD-RTO: 0.9 /100 WBC
PLATELET # BLD AUTO: 239 K/UL (ref 164–446)
PMV BLD AUTO: 10.7 FL (ref 9–12.9)
POTASSIUM SERPL-SCNC: 5.8 MMOL/L (ref 3.6–5.5)
POTASSIUM SERPL-SCNC: 5.9 MMOL/L (ref 3.6–5.5)
PRODUCT TYPE UPROD: NORMAL
PROTHROMBIN TIME: 26.6 SEC (ref 12–14.6)
RBC # BLD AUTO: 2.86 M/UL (ref 4.7–6.1)
RBC # FLD: NORMAL CELLS/UL
SODIUM SERPL-SCNC: 134 MMOL/L (ref 135–145)
UNIT STATUS USTAT: NORMAL
WBC # BLD AUTO: 9.4 K/UL (ref 4.8–10.8)
WBC # FLD: 2000 CELLS/UL

## 2019-08-01 PROCEDURE — 99291 CRITICAL CARE FIRST HOUR: CPT | Performed by: INTERNAL MEDICINE

## 2019-08-01 PROCEDURE — 76930 PR SONO GUIDE PERICARD TAP: CPT | Mod: 26 | Performed by: INTERNAL MEDICINE

## 2019-08-01 PROCEDURE — 87206 SMEAR FLUORESCENT/ACID STAI: CPT

## 2019-08-01 PROCEDURE — 99233 SBSQ HOSP IP/OBS HIGH 50: CPT | Performed by: INTERNAL MEDICINE

## 2019-08-01 PROCEDURE — 700101 HCHG RX REV CODE 250

## 2019-08-01 PROCEDURE — 700111 HCHG RX REV CODE 636 W/ 250 OVERRIDE (IP): Performed by: INTERNAL MEDICINE

## 2019-08-01 PROCEDURE — 93308 TTE F-UP OR LMTD: CPT | Mod: 26 | Performed by: INTERNAL MEDICINE

## 2019-08-01 PROCEDURE — 700105 HCHG RX REV CODE 258

## 2019-08-01 PROCEDURE — 700105 HCHG RX REV CODE 258: Performed by: INTERNAL MEDICINE

## 2019-08-01 PROCEDURE — 84157 ASSAY OF PROTEIN OTHER: CPT

## 2019-08-01 PROCEDURE — 99233 SBSQ HOSP IP/OBS HIGH 50: CPT | Mod: 25 | Performed by: INTERNAL MEDICINE

## 2019-08-01 PROCEDURE — 700101 HCHG RX REV CODE 250: Performed by: INTERNAL MEDICINE

## 2019-08-01 PROCEDURE — 700102 HCHG RX REV CODE 250 W/ 637 OVERRIDE(OP): Performed by: HOSPITALIST

## 2019-08-01 PROCEDURE — 33015 PR INCIS HEART SAC TUBE: CPT | Performed by: INTERNAL MEDICINE

## 2019-08-01 PROCEDURE — 36430 TRANSFUSION BLD/BLD COMPNT: CPT

## 2019-08-01 PROCEDURE — 82945 GLUCOSE OTHER FLUID: CPT

## 2019-08-01 PROCEDURE — 87205 SMEAR GRAM STAIN: CPT

## 2019-08-01 PROCEDURE — 90935 HEMODIALYSIS ONE EVALUATION: CPT

## 2019-08-01 PROCEDURE — 99153 MOD SED SAME PHYS/QHP EA: CPT

## 2019-08-01 PROCEDURE — 84132 ASSAY OF SERUM POTASSIUM: CPT

## 2019-08-01 PROCEDURE — 89051 BODY FLUID CELL COUNT: CPT

## 2019-08-01 PROCEDURE — 88112 CYTOPATH CELL ENHANCE TECH: CPT

## 2019-08-01 PROCEDURE — 82962 GLUCOSE BLOOD TEST: CPT

## 2019-08-01 PROCEDURE — 770022 HCHG ROOM/CARE - ICU (200)

## 2019-08-01 PROCEDURE — 700111 HCHG RX REV CODE 636 W/ 250 OVERRIDE (IP)

## 2019-08-01 PROCEDURE — 0W9D30Z DRAINAGE OF PERICARDIAL CAVITY WITH DRAINAGE DEVICE, PERCUTANEOUS APPROACH: ICD-10-PCS | Performed by: INTERNAL MEDICINE

## 2019-08-01 PROCEDURE — 87070 CULTURE OTHR SPECIMN AEROBIC: CPT

## 2019-08-01 PROCEDURE — P9017 PLASMA 1 DONOR FRZ W/IN 8 HR: HCPCS

## 2019-08-01 PROCEDURE — 93308 TTE F-UP OR LMTD: CPT

## 2019-08-01 PROCEDURE — 80048 BASIC METABOLIC PNL TOTAL CA: CPT

## 2019-08-01 PROCEDURE — 87116 MYCOBACTERIA CULTURE: CPT

## 2019-08-01 PROCEDURE — 36556 INSERT NON-TUNNEL CV CATH: CPT

## 2019-08-01 PROCEDURE — 85610 PROTHROMBIN TIME: CPT

## 2019-08-01 PROCEDURE — A9270 NON-COVERED ITEM OR SERVICE: HCPCS | Performed by: HOSPITALIST

## 2019-08-01 PROCEDURE — 99291 CRITICAL CARE FIRST HOUR: CPT | Performed by: HOSPITALIST

## 2019-08-01 PROCEDURE — 88305 TISSUE EXAM BY PATHOLOGIST: CPT

## 2019-08-01 PROCEDURE — 85025 COMPLETE CBC W/AUTO DIFF WBC: CPT

## 2019-08-01 RX ORDER — NOREPINEPHRINE BITARTRATE 1 MG/ML
INJECTION, SOLUTION INTRAVENOUS
Status: ACTIVE
Start: 2019-08-01 | End: 2019-08-02

## 2019-08-01 RX ORDER — SODIUM POLYSTYRENE SULFONATE 15 G/60ML
15 SUSPENSION ORAL; RECTAL EVERY 4 HOURS
Status: DISCONTINUED | OUTPATIENT
Start: 2019-08-01 | End: 2019-08-01

## 2019-08-01 RX ORDER — DEXTROSE MONOHYDRATE 100 MG/ML
INJECTION, SOLUTION INTRAVENOUS
Status: ACTIVE
Start: 2019-08-01 | End: 2019-08-02

## 2019-08-01 RX ORDER — SODIUM CHLORIDE 9 MG/ML
INJECTION, SOLUTION INTRAVENOUS
Status: ACTIVE
Start: 2019-08-01 | End: 2019-08-02

## 2019-08-01 RX ORDER — DEXTROSE MONOHYDRATE 100 MG/ML
INJECTION, SOLUTION INTRAVENOUS
Status: COMPLETED
Start: 2019-08-01 | End: 2019-08-01

## 2019-08-01 RX ORDER — LIDOCAINE HYDROCHLORIDE 20 MG/ML
INJECTION, SOLUTION INFILTRATION; PERINEURAL
Status: COMPLETED
Start: 2019-08-01 | End: 2019-08-01

## 2019-08-01 RX ORDER — MIDAZOLAM HYDROCHLORIDE 1 MG/ML
INJECTION INTRAMUSCULAR; INTRAVENOUS
Status: COMPLETED
Start: 2019-08-01 | End: 2019-08-01

## 2019-08-01 RX ORDER — HEPARIN SODIUM,PORCINE 1000/ML
VIAL (ML) INJECTION
Status: COMPLETED
Start: 2019-08-01 | End: 2019-08-01

## 2019-08-01 RX ORDER — SODIUM CHLORIDE 9 MG/ML
INJECTION, SOLUTION INTRAVENOUS
Status: COMPLETED
Start: 2019-08-01 | End: 2019-08-01

## 2019-08-01 RX ADMIN — DOXAZOSIN 4 MG: 2 TABLET ORAL at 17:13

## 2019-08-01 RX ADMIN — SENNOSIDES, DOCUSATE SODIUM 2 TABLET: 50; 8.6 TABLET, FILM COATED ORAL at 17:13

## 2019-08-01 RX ADMIN — PHYTONADIONE 10 MG: 10 INJECTION, EMULSION INTRAMUSCULAR; INTRAVENOUS; SUBCUTANEOUS at 13:07

## 2019-08-01 RX ADMIN — OXYCODONE HYDROCHLORIDE 5 MG: 5 TABLET ORAL at 23:27

## 2019-08-01 RX ADMIN — METOPROLOL SUCCINATE 150 MG: 50 TABLET, EXTENDED RELEASE ORAL at 06:24

## 2019-08-01 RX ADMIN — TORSEMIDE 30 MG: 20 TABLET ORAL at 06:25

## 2019-08-01 RX ADMIN — HEPARIN SODIUM 2000 UNITS: 200 INJECTION, SOLUTION INTRAVENOUS at 15:14

## 2019-08-01 RX ADMIN — SENNOSIDES, DOCUSATE SODIUM 2 TABLET: 50; 8.6 TABLET, FILM COATED ORAL at 06:24

## 2019-08-01 RX ADMIN — Medication 2001 MG: at 17:13

## 2019-08-01 RX ADMIN — POLYETHYLENE GLYCOL 3350 1 PACKET: 17 POWDER, FOR SOLUTION ORAL at 17:13

## 2019-08-01 RX ADMIN — OMEPRAZOLE 40 MG: 20 CAPSULE, DELAYED RELEASE ORAL at 06:24

## 2019-08-01 RX ADMIN — LIDOCAINE HYDROCHLORIDE: 20 INJECTION, SOLUTION INFILTRATION; PERINEURAL at 15:14

## 2019-08-01 RX ADMIN — HEPARIN SODIUM: 1000 INJECTION, SOLUTION INTRAVENOUS; SUBCUTANEOUS at 15:14

## 2019-08-01 RX ADMIN — Medication 1 ML: at 19:15

## 2019-08-01 RX ADMIN — AMIODARONE HYDROCHLORIDE 200 MG: 200 TABLET ORAL at 06:24

## 2019-08-01 RX ADMIN — PRAVASTATIN SODIUM 20 MG: 20 TABLET ORAL at 22:12

## 2019-08-01 RX ADMIN — TRAZODONE HYDROCHLORIDE 300 MG: 150 TABLET ORAL at 22:15

## 2019-08-01 RX ADMIN — NOREPINEPHRINE BITARTRATE 5 MCG/MIN: 1 INJECTION INTRAVENOUS at 21:40

## 2019-08-01 RX ADMIN — DEXTROSE MONOHYDRATE 250 ML: 100 INJECTION, SOLUTION INTRAVENOUS at 17:20

## 2019-08-01 RX ADMIN — SODIUM CHLORIDE 500 ML: 9 INJECTION, SOLUTION INTRAVENOUS at 17:21

## 2019-08-01 RX ADMIN — TAMSULOSIN HYDROCHLORIDE 0.8 MG: 0.4 CAPSULE ORAL at 06:25

## 2019-08-01 RX ADMIN — FENTANYL CITRATE 100 MCG: 0.05 INJECTION, SOLUTION INTRAMUSCULAR; INTRAVENOUS at 15:33

## 2019-08-01 ASSESSMENT — ENCOUNTER SYMPTOMS
NERVOUS/ANXIOUS: 0
HALLUCINATIONS: 0
SHORTNESS OF BREATH: 1
EYE REDNESS: 0
FLANK PAIN: 0
SINUS PAIN: 0
FOCAL WEAKNESS: 0
CHEST TIGHTNESS: 0
NECK PAIN: 0
ORTHOPNEA: 1
STRIDOR: 0
EYES NEGATIVE: 1
BACK PAIN: 0
WHEEZING: 0
CHOKING: 0
NAUSEA: 0
DIAPHORESIS: 0
DIZZINESS: 1
FATIGUE: 1
SENSORY CHANGE: 0
WEIGHT LOSS: 0
ABDOMINAL PAIN: 0
APNEA: 0
FEVER: 0
TREMORS: 0
SHORTNESS OF BREATH: 0
MYALGIAS: 0
DIARRHEA: 0
EYE DISCHARGE: 0
COUGH: 0
CHILLS: 0
SPUTUM PRODUCTION: 0
WEAKNESS: 0
VOMITING: 0
SPEECH CHANGE: 0
HEMOPTYSIS: 0
PALPITATIONS: 0

## 2019-08-01 ASSESSMENT — LIFESTYLE VARIABLES: SUBSTANCE_ABUSE: 0

## 2019-08-01 NOTE — PROGRESS NOTES
Pt transferred to ICU on monitor at 1230. Pt resting in bed. Dr. Warner at bedside. call light within reach

## 2019-08-01 NOTE — PROGRESS NOTES
[facility] Medicine Daily Progress Note    Date of Service  8/1/2019    Chief Complaint  79 y.o. male admitted 7/28/2019 with shortness of breath.     Hospital Course    80 yo male hx ESRD on HD, dysipidiemia, admitted volume overload and hyperkalemia.  Started on emergent HD.    Interval Problem Update    Unable to maintain blood pressure readings.  Experiencing light headedness.  INR  still elevated--   Plan additional FFP.  Discussed with cardiology-plan pericardiocentesis.  To be evaluated by surgery for pericardial window.     Consultants/Specialty  Dr. Guerra, nephrology     Code Status  Full     Disposition  Plan tx to ICU    Review of Systems  Review of Systems   Constitutional: Negative for chills, diaphoresis and fever.   HENT: Negative for congestion.    Eyes: Negative for discharge and redness.   Respiratory: Positive for shortness of breath. Negative for cough, wheezing and stridor.    Cardiovascular: Negative for chest pain, palpitations and leg swelling.   Gastrointestinal: Negative for abdominal pain, diarrhea and vomiting.   Genitourinary: Negative for flank pain and hematuria.   Musculoskeletal: Negative for back pain, joint pain and neck pain.   Skin: Negative for itching and rash.   Neurological: Positive for dizziness. Negative for tremors, sensory change, speech change, focal weakness and weakness.   Psychiatric/Behavioral: Negative for hallucinations and substance abuse. The patient is not nervous/anxious.         Physical Exam  Temp:  [35.6 °C (96 °F)-36.6 °C (97.9 °F)] 35.6 °C (96 °F)  Pulse:  [] 104  Resp:  [16-23] 18  BP: ()/(33-62) 92/33  SpO2:  [92 %-97 %] 97 %    Physical Exam   Constitutional: He is oriented to person, place, and time. No distress.   HENT:   Head: Normocephalic and atraumatic.   Nose: Nose normal.   Eyes: Conjunctivae and EOM are normal. No scleral icterus.   Neck: Normal range of motion. No JVD present.   Cardiovascular: Exam reveals friction rub.   Distant  heart sounds.   Reg irreg   Pulmonary/Chest: No stridor. He has no wheezes. He has no rales.   Abdominal: Soft. He exhibits no distension. There is no tenderness.   Musculoskeletal: He exhibits no edema or tenderness.   Left upper ext Fistula w thrill .    Neurological: He is alert and oriented to person, place, and time.   No focal weakness   Skin: Skin is warm and dry. He is not diaphoretic. No pallor.   Psychiatric: He has a normal mood and affect. His behavior is normal.   Vitals reviewed.      Fluids    Intake/Output Summary (Last 24 hours) at 8/1/2019 1323  Last data filed at 8/1/2019 1245  Gross per 24 hour   Intake 260 ml   Output 0 ml   Net 260 ml       Laboratory  Recent Labs     07/30/19  0505 08/01/19  0700   WBC 9.6 9.4   RBC 2.99* 2.86*   HEMOGLOBIN 9.2* 8.9*   HEMATOCRIT 29.7* 28.9*   MCV 99.3* 101.0*   MCH 30.8 31.1   MCHC 31.0* 30.8*   RDW 55.7* 56.4*   PLATELETCT 188 239   MPV 10.8 10.7     Recent Labs     07/30/19  0505  07/31/19  0241 07/31/19  1253 08/01/19  0700   SODIUM 133*  --  135  --  134*   POTASSIUM 6.9*   < > 6.4* 5.3 5.8*   CHLORIDE 93*  --  92*  --  90*   CO2 15*  --  15*  --  16*   GLUCOSE 101*  --  65  --  70   BUN 90*  --  94*  --  90*   CREATININE 10.01*  --  9.16*  --  9.36*   CALCIUM 9.5  --  9.4  --  9.5    < > = values in this interval not displayed.     Recent Labs     07/31/19  0241 07/31/19  1308 08/01/19  0700   INR 3.42* 3.02* 2.35*               Imaging  DX-CHEST-LIMITED (1 VIEW)   Final Result      1.  No evidence of pneumothorax following RIGHT neck catheter placement   2.  Persistently enlarged cardiac silhouette   3.  Atherosclerosis      EC-ECHOCARDIOGRAM LTD W/O CONT   Final Result      CT-ABDOMEN-PELVIS W/O   Final Result      No CT evidence of acute inflammatory process in the abdomen or pelvis      Very large pericardial effusion has increased from 2016      Small right pleural effusion has enlarged      Aortic valvular calcifications indicating aortic stenosis.  There is also severe coronary disease      Atrophic kidneys with cysts. Hepatic cysts are also seen.      Moderate distal colonic diverticulosis without evidence of diverticulitis      US-HEMODIALYSIS GRAFT DUPLEX COMP UPPER EXTREMITY   Final Result      [id number] VIEW   Final Result      1.  No evidence of bowel obstruction.   2.  Abdominal aortic and mesenteric vascular calcifications.      DX-CHEST-PORTABLE (1 VIEW)   Final Result      1.  Stable cardiomegaly.   2.  No acute abnormality.      CL-PERICARDIOCENTESIS    (Results Pending)   US-HEMODIALYSIS GRAFT DUPLEX COMP UPPER EXTREMITY    (Results Pending)        Assessment/Plan  * Pericardial tamponade  Assessment & Plan  Stat echo confirms very large Pericardial effusion with Hemodynamic compromise.    Initially consulted  Dr. Crum, cardiology - plan  for pericardiocentesis .  Surgery to assess for pericardial window.   Given coagulopathy with FFP Vit k, K centra on 7-31-19-- plan additional FFP  Tx to ICU with unstable BP and persistent hyperkalemia.     Hypotension  Assessment & Plan  Due to Pericardial tamponade possible poor measurements due to vascular disease.  Initially improved BP with decline latter morning.   Plan tx to ICU - for close BP monitoring.    Pressor support if persistent map < 60 w symptoms.   Discussed with cardiology - will plan Pericardiocentesis .    Tx 2 units FFP to reverse coagulopathy.  Hold antihypertensives.     Hyperkalemia, diminished renal excretion- (present on admission)  Assessment & Plan  Post emergent hemodialysis per  Dr. Larson with persistent hyperkalemia despite daily HD.  Possible poorly functional  fistula    Monitor tele   Ace inh stopped  Low k diet.   Give IV d50 , Insulin , Kayexalate - Fu serum Na.   Unclear timing of next HD due to planned pericardiocentesis.   Discussed with Dr. Guerra.     Essential hypertension- (present on admission)  Assessment & Plan  Unstable BP with hypotensive episodes , likely  due to Pericardial tamponade.   Plan intervention per cardiology   Hold antihypertensives.     ESRD (end stage renal disease) on dialysis ([facility])- (present on admission)  Assessment & Plan  Post emergent hemodialysis 7/29/2019 and daily HD with persistent hyperkal 6.4  --> 5.3  --> 5.8  Fistulas may not be effective . Discussed with Dr. Guerra- Nephrology - will discuss with Vascular - Dr. Peters.   Low k, renal diet  Lisinopril stopped.   Hold diuretics for now due to low BP      Anemia in CKD (chronic kidney disease)- (present on admission)  Assessment & Plan  No symptoms of bleeding.   Monitor Hgb  Transfuse if needed for hemoglobin less than 7 gm/dL      Dyslipidemia- (present on admission)  Assessment & Plan  On statin therapy.  Monitor.     COPD (chronic obstructive pulmonary disease) ([facility])- (present on admission)  Assessment & Plan  Without current exacerbation.   Respiratory therapy as needed.   Continue scheduled Flovent, As needed Xopenex with scheduled Ellipta  Rt protocols      BPH (benign prostatic hypertrophy)- (present on admission)  Assessment & Plan  Continue with Flomax.  Monitor for urinary retention symptoms.    Paroxysmal atrial fibrillation (HCC)  Assessment & Plan  With  A flutter   Holding warfarin for planned intervention   Treat tamponade may help Aflutter. Cardiology input.   Continue amiodarone  Plan reversal of INR for intervention -- risk and benefits of stroke discussed with patient and wife plan to proceed.        VTE prophylaxis: warfarin       Discussed with Multi disciplinary team, wife , cardiology and nephrology plan of care .    Critical care time spent 35 mins, no overlap.  Discussed with Dr. Sweet pulmonary

## 2019-08-01 NOTE — PROGRESS NOTES
Inpatient Anticoagulation Service Note    Date: 8/1/2019    Reason for Anticoagulation: Atrial Fibrillation   Target INR: 2.0 to 3.0  [id number] VASc Score: 4  HAS-BLED Score: 3   Hemoglobin Value: (!) 8.9  Hematocrit Value: (!) 28.9    INR from last 7 days     Date/Time INR Value    08/01/19 0700  (!) 2.35    07/31/19 1308  (!) 3.02    07/31/19 0241  (!) 3.42    07/30/19 0506  (!) 2.81    07/28/19 2321  (!) 2.16        Dose from last 7 days     Date/Time Dose (mg)    08/01/19 0932  0    07/31/19 1012  0    07/30/19 0935  2.5    07/29/19 0331  5        Average Dose (mg): 5  Significant Interactions: Amiodarone, Statin, Proton Pump Inhibito    Reversal Agent Administered: Vitamin K  Intravenous, PCC (Prothrombin Complex Concentrate), Fresh Frozen Plasma    Comments: Holding warfarin.  Pericardial tamponade with initial hypotension - hypotension now resolved - with plan for cardiothoracic intervention.  Warfarin reversed with 1 unit FFP, IV Vit K 10mg and 25units/kg of KCentra - however AM INR remains elevated at 2.3.  Will hold warfarin - monitor vitals closely, repeat INR.    Plan:  HOLD warfarin.   Education Material Provided?: No(chronic warfarin patient)  Pharmacist suggested discharge dosing: TBD once able to resume warfarin.     Tarik Mancini

## 2019-08-01 NOTE — PROGRESS NOTES
"Report received. Pt care assumed. Assessment performed. Pt AOx4. Pt laying supine in bed. Pt c/o 4/10 abd \"discomfort\" and no signs of distress. Bed in low, locked position. Bed alarm on. Call light within reach. Treaded socks on pt.  Hourly rounding in place.  "

## 2019-08-01 NOTE — CONSULTS
Critical Care Consultation    Date of consult: 8/1/2019    Referring Physician  Yusuf Douglas M.D.    Reason for Consultation  Pericardial effusion with tamponade physiology and hypotension    History of Presenting Illness  79 y.o. male with past medical history of systolic congestive heart failure, atrial flutter on anticoagulation with Coumadin, coronary disease, ACS, end-stage renal disease on HD, COPD, hypercholesterolemia, hypertension, PVD who presented 7/28/2019 with shortness of breath and lightheadedness.  He was admitted for volume overload and hyperkalemia and was emergently dialyzed.  A CT scan of his abdomen was obtained on 7/30/2019 which showed a very large pericardial effusion and was followed by an echocardiogram 7/31/19 which showed evidence of significant inflow variation concerning for possible tamponade.  His INR was noted to be elevated at 3.42 and he was given K Centra, vitamin K with improvement to 3.02 then 2.35.  Cardiology planning a pericardiocentesis following administration of 2 units of FFP.  This afternoon the patient's blood pressure became worse with systolic blood pressures in the 60s and 50s.  He was transferred to the ICU and cardiology was recontacted for emergent pericardiocentesis.  At this time the patient has minimally symptomatic being lightheaded and epigastric/retrosternal pain.  Noninvasive blood pressure measurements are difficult to obtain due to the patient's bilateral upper extremity AV fistulas (left currently being used for HD and right being matured), all NIBP measurements are being obtained on his left lower extremity.  Thoracic surgery has also been consulted for pericardial window following drainage and correction of coagulopathy.  He denies any recent infectious symptoms.    Code Status  Full Code    Review of Systems  Review of Systems   Constitutional: Positive for malaise/fatigue. Negative for chills and fever.   Respiratory: Positive for shortness of  breath. Negative for cough, sputum production and stridor.    Cardiovascular: Positive for chest pain.   Gastrointestinal: Negative for abdominal pain, nausea and vomiting.   Genitourinary: Negative for dysuria.   Musculoskeletal: Negative for myalgias.   Skin: Negative for rash.   Neurological: Positive for dizziness. Negative for sensory change and focal weakness.       Past Medical History   has a past medical history of Anesthesia, Anticoagulation monitoring, special range, Aortic stenosis, Arterial leg ulcer ([facility]) (11/8/2018), Atrial flutter ([facility]) (6/12/2019), Basal cell carcinoma of left cheek (3/26/2015), BPH (7/14/2009), Bronchitis (12/25/2018), CAD (coronary artery disease) (2/20/2014), CATARACT, CHF (congestive heart failure), NYHA class II, chronic, systolic ([facility]) E F30 in setting of atrial flutter (6/13/2019), Chronic respiratory failure with hypoxia ([facility]) (5/8/2016), CKD (chronic kidney disease) stage 4, GFR 15-29 ml/min ([facility]) (1/15/2010), COPD (chronic obstructive pulmonary disease) ([facility]), Detached retina, Dialysis, EMPHYSEMA, Gout, Heart burn, Heart murmur, High cholesterol, Hypertension, Indigestion, Kidney cyst, Leg pain, bilateral (8/10/2015), On supplemental oxygen therapy, Peripheral vascular disease ([facility]) (8/10/2015), Pneumonia, Primary insomnia (3/22/2018), Proteinuria, PVD (peripheral vascular disease) ([facility]), and Sleep apnea.    Surgical History   has a past surgical history that includes cataract phaco with iol (4/8/08); av fistula creation (2/12/2010); av fistula revision (2/19/2010); av fistulogram (7/23/2010); angioplasty balloon (7/23/2010); av fistulogram (9/17/2010); angioplasty balloon (9/17/2010); vitrectomy posterior (1/18/2011); scleral buckling (1/18/2011); recovery (8/12/2011); vitrectomy posterior (10/11/2011); recovery (7/23/2012); recovery (1/29/2013); recovery (7/2/2013); av fistula thrombolysis (7/2/2013); recovery (12/17/2013); recovery (3/24/2014); recovery (7/29/2014);  recovery (3/24/2015); recovery (12/23/2015); gastroscopy with biopsy (8/13/2016); colonoscopy - endo (8/15/2016); endoscopy procedure (3/21/2018); femoral endarterectomy (Left, 1/25/2019); femoral popliteal bypass (Left, 1/25/2019); angiogram (Left, 1/25/2019); other orthopedic surgery (1998); av fistula creation (Right, 2/21/2019); lisa (6/13/2019); and cardioversion (6/13/2019).    Family History  family history includes Genitourinary () Problems in his maternal aunt; Hypertension in his brother and mother; Lung Disease in his father; Stroke in his mother.    Social History   reports that he quit smoking about 10 years ago. His smoking use included cigarettes. He has a 40.00 pack-year smoking history. He has never used smokeless tobacco. He reports that he does not drink alcohol or use drugs.    Medications  Home Medications     Reviewed by Simeon Mcmillan R.N. (Registered Nurse) on 07/28/19 at 2232  Med List Status: Complete   Medication Last Dose Status   ALBUTEROL INH  Active   amiodarone (CORDARONE) 200 MG Tab  Active   amiodarone (PACERONE) 400 MG tablet  Active   calcium acetate (PHOS-LO) 667 MG Cap  Active   doxazosin (CARDURA) 4 MG Tab  Active   Fluticasone-Umeclidin-Vilant (TRELEGY ELLIPTA) 100-62.5-25 MCG/INH AEROSOL POWDER, BREATH ACTIVATED  Active   levalbuterol (XOPENEX) 1.25 MG/3ML Nebu Soln  Active   lisinopril (PRINIVIL) 5 MG Tab  Active   metoprolol SR (TOPROL XL) 100 MG TABLET SR 24 HR  Active   omeprazole (PRILOSEC) 40 MG delayed-release capsule  Active   pravastatin (PRAVACHOL) 20 MG Tab  Active   tamsulosin (FLOMAX) 0.4 MG capsule  Active   torsemide (DEMADEX) 10 MG tablet  Active   traZODone (DESYREL) 150 MG Tab  Active   warfarin (COUMADIN) 5 MG Tab  Active   zolpidem (AMBIEN) 10 MG Tab  Active              Current Facility-Administered Medications   Medication Dose Route Frequency Provider Last Rate Last Dose   • SODIUM CHLORIDE 0.9 % IV SOLN            • phytonadione (AQUA-MEPHYTON)  10 mg in NS 50 mL IVPB  10 mg Intravenous Once Deniz Warner Jr. D.OLise   Stopped at 08/01/19 1407   • DEXTROSE 10% BOLUS 500 mL  50 g Intravenous Once Yusuf Douglas M.D.        And   • insulin regular (HUMULIN R) injection 8 Units  8 Units Intravenous Once Yusuf Douglas M.D.       • amiodarone (CORDARONE) tablet 200 mg  200 mg Oral DAILY Jonathan Shetty M.D.   200 mg at 08/01/19 0624   • calcium acetate (PHOS-LO) 667 MG tablet 2,001 mg  2,001 mg Oral TID WITH MEALS Jonathan Shetty M.D.   Stopped at 08/01/19 0730   • doxazosin (CARDURA) tablet 4 mg  4 mg Oral Q EVENING Jonathan Shetty M.D.   Stopped at 07/31/19 1800   • levalbuterol (XOPENEX) 1.25 MG/3ML nebulizer solution 1.25 mg  1.25 mg Nebulization Q4H PRN (RT) Jonathan Shetty M.D.       • metoprolol SR (TOPROL XL) tablet 150 mg  150 mg Oral DAILY Jonathan Shetty M.D.   150 mg at 08/01/19 0624   • omeprazole (PRILOSEC) capsule 40 mg  40 mg Oral SANTHOSH Shetty M.D.   40 mg at 08/01/19 0624   • pravastatin (PRAVACHOL) tablet 20 mg  20 mg Oral Nightly Jonathan Shetty M.D.   20 mg at 07/29/19 1946   • tamsulosin (FLOMAX) capsule 0.8 mg  0.8 mg Oral QAM Jonathan Shetty M.D.   0.8 mg at 08/01/19 0625   • torsemide (DEMADEX) tablet 30 mg  30 mg Oral DAILY Jonathan Shetty M.D.   30 mg at 08/01/19 0625   • traZODone (DESYREL) tablet 300 mg  300 mg Oral Nightly Jonathan Shetty M.D.       • acetaminophen (TYLENOL) tablet 650 mg  650 mg Oral Q6HRS PRN Jonathan Shetty M.D.   650 mg at 07/31/19 2207   • Pharmacy Consult Request ...Pain Management Review 1 Each  1 Each Other PHARMACY TO DOSE Jonathan Shetty M.D.        And   • oxyCODONE immediate-release (ROXICODONE) tablet 2.5 mg  2.5 mg Oral Q3HRS PRN Jonathan Shetty M.D.        And   • oxyCODONE immediate-release (ROXICODONE) tablet 5 mg  5 mg Oral Q3HRS PRN Jonathan Shetty M.D.   5 mg at 07/31/19 2207    And   • morphine (pf) 4 mg/ml injection 2 mg  2 mg Intravenous Q3HRS PRN Jonathan Shetty M.D.   2 mg at 07/29/19  2025   • ondansetron (ZOFRAN) syringe/vial injection 4 mg  4 mg Intravenous Q4HRS PRN Jonathan Shetty M.D.       • ondansetron (ZOFRAN ODT) dispertab 4 mg  4 mg Oral Q4HRS PRN Jonathan Shetty M.D.       • senna-docusate (PERICOLACE or SENOKOT S) 8.6-50 MG per tablet 2 Tab  2 Tab Oral BID Jonathan Shetty M.D.   2 Tab at 08/01/19 0624    And   • polyethylene glycol/lytes (MIRALAX) PACKET 1 Packet  1 Packet Oral QDAY PRN Jonathan Shetty M.D.        And   • magnesium hydroxide (MILK OF MAGNESIA) suspension 30 mL  30 mL Oral QDAY PRN Jonathan Shetty M.D.        And   • bisacodyl (DULCOLAX) suppository 10 mg  10 mg Rectal QDAY PRN Jonathan Shetty M.D.       • umeclidinium-vilanterol (ANORO ELLIPTA) inhaler 1 Puff  1 Puff Inhalation QDSEE (RT) Jonathan Shetty M.D.   1 Puff at 07/31/19 1459    And   • fluticasone (FLOVENT HFA) 44 MCG/ACT inhaler 88 mcg  2 Puff Inhalation QDAILY (RT) Jonathan Shetty M.D.   88 mcg at 07/31/19 1458   • lidocaine (XYLOCAINE) 1 % injection 1 mL  1 mL Other DIALYSIS PRN Long Larson M.D.   1 mL at 07/31/19 0745   • heparin injection 1,500 Units  1,500 Units Intravenous DIALYSIS PRN Long Larson M.D.   1,500 Units at 07/31/19 0755       Allergies  No Known Allergies    Vital Signs last 24 hours  Temp:  [35.6 °C (96 °F)-36.6 °C (97.9 °F)] 35.6 °C (96 °F)  Pulse:  [] 104  Resp:  [16-23] 18  BP: ()/(33-62) 92/33  SpO2:  [92 %-97 %] 97 %    Physical Exam  Physical Exam   Constitutional: He is oriented to person, place, and time. He appears well-developed and well-nourished.   HENT:   Head: Normocephalic and atraumatic.   Right Ear: External ear normal.   Left Ear: External ear normal.   Mouth/Throat: Oropharynx is clear and moist.   Eyes: Conjunctivae and EOM are normal. No scleral icterus.   Neck: Neck supple. JVD present. No tracheal deviation present.   Right IJ central venous catheter   Cardiovascular: Normal rate and intact distal pulses. An irregularly irregular rhythm  present.   Distant heart sounds   Pulmonary/Chest: Effort normal and breath sounds normal. No respiratory distress.   Abdominal: Soft. Bowel sounds are normal. He exhibits no distension.   Musculoskeletal: Normal range of motion. He exhibits no edema or tenderness.   Bilateral upper extremity AV fistulas, left forearm AV fistula with palpable thrill.  Right forearm AV fistula with no thrill.   Neurological: He is alert and oriented to person, place, and time. No cranial nerve deficit. He exhibits normal muscle tone. Coordination normal.   Skin: Skin is warm and dry.   Psychiatric: He has a normal mood and affect.   Nursing note and vitals reviewed.      Fluids    Intake/Output Summary (Last 24 hours) at 8/1/2019 1328  Last data filed at 8/1/2019 1245  Gross per 24 hour   Intake 260 ml   Output 0 ml   Net 260 ml       Laboratory  Recent Results (from the past 48 hour(s))   POTASSIUM SERUM (K)    Collection Time: 07/30/19  3:01 PM   Result Value Ref Range    Potassium 6.1 (H) 3.6 - 5.5 mmol/L   PROTHROMBIN TIME    Collection Time: 07/31/19  2:41 AM   Result Value Ref Range    PT 35.8 (H) 12.0 - 14.6 sec    INR 3.42 (H) 0.87 - 1.13   Basic Metabolic Panel    Collection Time: 07/31/19  2:41 AM   Result Value Ref Range    Sodium 135 135 - 145 mmol/L    Potassium 6.4 (H) 3.6 - 5.5 mmol/L    Chloride 92 (L) 96 - 112 mmol/L    Co2 15 (L) 20 - 33 mmol/L    Glucose 65 65 - 99 mg/dL    Bun 94 (HH) 8 - 22 mg/dL    Creatinine 9.16 (HH) 0.50 - 1.40 mg/dL    Calcium 9.4 8.5 - 10.5 mg/dL    Anion Gap 28.0 (H) 0.0 - 11.9   ESTIMATED GFR    Collection Time: 07/31/19  2:41 AM   Result Value Ref Range    GFR If  7 (A) >60 mL/min/1.73 m 2    GFR If Non African American 6 (A) >60 mL/min/1.73 m 2   EC-ECHOCARDIOGRAM LTD W/O CONT    Collection Time: 07/31/19 10:00 AM   Result Value Ref Range    Eject.Frac. MOD BP 41.66     Eject.Frac. MOD 4C 53.59     Eject.Frac. MOD 2C 26.39    FRESH FROZEN PLASMA    Collection Time:  07/31/19 10:52 AM   Result Value Ref Range    Component F       TA2                 Thawed Plasma 2     [id number]   transfused   07/31/19   10:55      Product Type Thawed Apheresis Plasma 2nd Cont     Dispense Status transfused     Unit Number (Barcoded) [id number]     Product Code (Barcoded) V9545K75     Blood Type (Barcoded) 5100     Component Ft       FPT                 Plasma, Thawed      [id number]   issued       08/01/19   11:02      Product Type Plasma  Thawed     Dispense Status issued     Unit Number (Barcoded) [id number]     Product Code (Barcoded) D4526Q62     Blood Type (Barcoded) 5100     Component F       TA                  Thawed Plasma       [id number]   issued       08/01/19   12:39      Product Type Thawed Apheresis Plasma     Dispense Status issued     Unit Number (Barcoded) [id number]     Product Code (Barcoded) G7558D28     Blood Type (Barcoded) 7300    POTASSIUM SERUM (K)    Collection Time: 07/31/19 12:53 PM   Result Value Ref Range    Potassium 5.3 3.6 - 5.5 mmol/L   Prothrombin Time    Collection Time: 07/31/19  1:08 PM   Result Value Ref Range    PT 32.4 (H) 12.0 - 14.6 sec    INR 3.02 (H) 0.87 - 1.13   PROTHROMBIN TIME    Collection Time: 08/01/19  7:00 AM   Result Value Ref Range    PT 26.6 (H) 12.0 - 14.6 sec    INR 2.35 (H) 0.87 - 1.13   CBC WITH DIFFERENTIAL    Collection Time: 08/01/19  7:00 AM   Result Value Ref Range    WBC 9.4 4.8 - 10.8 K/uL    RBC 2.86 (L) 4.70 - 6.10 M/uL    Hemoglobin 8.9 (L) 14.0 - 18.0 g/dL    Hematocrit 28.9 (L) 42.0 - 52.0 %    .0 (H) 81.4 - 97.8 fL    MCH 31.1 27.0 - 33.0 pg    MCHC 30.8 (L) 33.7 - 35.3 g/dL    RDW 56.4 (H) 35.9 - 50.0 fL    Platelet Count 239 164 - 446 K/uL    MPV 10.7 9.0 - 12.9 fL    Neutrophils-Polys 90.30 (H) 44.00 - 72.00 %    Lymphocytes 2.80 (L) 22.00 - 41.00 %    Monocytes 6.00 0.00 - 13.40 %    Eosinophils 0.00 0.00 - 6.90 %    Basophils 0.00 0.00 - 1.80 %    Immature Granulocytes 0.90 0.00 - 0.90 %     Nucleated RBC 0.90 /100 WBC    Neutrophils (Absolute) 8.45 (H) 1.82 - 7.42 K/uL    Lymphs (Absolute) 0.26 (L) 1.00 - 4.80 K/uL    Monos (Absolute) 0.56 0.00 - 0.85 K/uL    Eos (Absolute) 0.00 0.00 - 0.51 K/uL    Baso (Absolute) 0.00 0.00 - 0.12 K/uL    Immature Granulocytes (abs) 0.08 0.00 - 0.11 K/uL    NRBC (Absolute) 0.08 K/uL   Basic Metabolic Panel    Collection Time: 08/01/19  7:00 AM   Result Value Ref Range    Sodium 134 (L) 135 - 145 mmol/L    Potassium 5.8 (H) 3.6 - 5.5 mmol/L    Chloride 90 (L) 96 - 112 mmol/L    Co2 16 (L) 20 - 33 mmol/L    Glucose 70 65 - 99 mg/dL    Bun 90 (HH) 8 - 22 mg/dL    Creatinine 9.36 (HH) 0.50 - 1.40 mg/dL    Calcium 9.5 8.5 - 10.5 mg/dL    Anion Gap 28.0 (H) 0.0 - 11.9   ESTIMATED GFR    Collection Time: 08/01/19  7:00 AM   Result Value Ref Range    GFR If  7 (A) >60 mL/min/1.73 m 2    GFR If Non African American 5 (A) >60 mL/min/1.73 m 2       Imaging  EC-ECHOCARDIOGRAM LTD W/O CONT   Final Result      DX-CHEST-LIMITED (1 VIEW)   Final Result      1.  No evidence of pneumothorax following RIGHT neck catheter placement   2.  Persistently enlarged cardiac silhouette   3.  Atherosclerosis      EC-ECHOCARDIOGRAM LTD W/O CONT   Final Result      CT-ABDOMEN-PELVIS W/O   Final Result      No CT evidence of acute inflammatory process in the abdomen or pelvis      Very large pericardial effusion has increased from 2016      Small right pleural effusion has enlarged      Aortic valvular calcifications indicating aortic stenosis. There is also severe coronary disease      Atrophic kidneys with cysts. Hepatic cysts are also seen.      Moderate distal colonic diverticulosis without evidence of diverticulitis      US-HEMODIALYSIS GRAFT DUPLEX COMP UPPER EXTREMITY   Final Result      UF-RDOBVCT-1 VIEW   Final Result      1.  No evidence of bowel obstruction.   2.  Abdominal aortic and mesenteric vascular calcifications.      DX-CHEST-PORTABLE (1 VIEW)   Final Result      1.   Stable cardiomegaly.   2.  No acute abnormality.      CL-PERICARDIOCENTESIS    (Results Pending)   US-HEMODIALYSIS GRAFT DUPLEX COMP UPPER EXTREMITY    (Results Pending)       Assessment/Plan  * Pericardial tamponade- (present on admission)  Assessment & Plan  Hemodynamically significant with current hypotension  Emergently reversing Coumadin for emergent pericardial drainage  Cardiology on board, going to Cath Lab now  Thoracic surgery on board for pericardial window when coagulopathy corrected and pericardial effusion drained  Monitor for recurrence following drainage  Monitor hemodynamics following drainage, may need to hold antihypertensives however hopefully after relieving obstructive shock hemodynamics will improve.    Shock ([facility])  Assessment & Plan  Obstructive, secondary to pericardial tamponade  Emergent pericardial drainage  Difficult to obtain noninvasive blood pressure monitoring due to inability to use upper extremities for blood pressures and peripheral vascular disease, may require arterial line placement  Monitor following drainage for improvement in hemodynamics  Vasopressors if required to maintain MAP >65 mmHg  Currently receiving fluid bolus to increase preload, will likely need volume control with HD following improvement in shock    Paroxysmal atrial fibrillation (HCC)- (present on admission)  Assessment & Plan  Continue amiodarone  Continue metoprolol if hemodynamics improve  Reverse Coumadin coagulopathy for pending invasive procedures with 2 units FFP    Chronic anticoagulation- (present on admission)  Assessment & Plan  With coagulopathy due to Coumadin use  Has received Kcentra and vitamin K, 2 units FFP being transfused emergently    Essential hypertension- (present on admission)  Assessment & Plan  Currently hypotensive  Hold all antihypertensive medications (doxazosin, Flomax, torsemide) for systolic blood pressure less than 100    ESRD (end stage renal disease) on dialysis ([facility])-  (present on admission)  Assessment & Plan  Nephrology on board  HD for volume control and electrolyte abnormalities    Hyperkalemia, diminished renal excretion- (present on admission)  Assessment & Plan  HD following pericardiocentesis  Closely monitor    Anemia in CKD (chronic kidney disease)- (present on admission)  Assessment & Plan  Monitor for need to transfuse, transfusion threshold: Hemoglobin <7    Dyslipidemia- (present on admission)  Assessment & Plan  Continue pravastatin    COPD (chronic obstructive pulmonary disease) (HCC)- (present on admission)  Assessment & Plan  Not currently in exacerbation  Continue home Anoro Ellipta, Flovent and Xopenex  RT/O2 Protocols  Titrate supplemental FiO2 to maintain SpO2 >88%    BPH (benign prostatic hypertrophy)- (present on admission)  Assessment & Plan  Hold Flomax due to current hypotension      Discussed patient condition and risk of morbidity and/or mortality with Hospitalist, Family, RN, RT, Pharmacy, Code status disscussed, Charge nurse / hot rounds, Patient and cardiology.      The patient remains critically ill.  Critical care time = 60 minutes in directly providing and coordinating critical care and extensive data review.  No time overlap and excludes procedures.

## 2019-08-01 NOTE — PROGRESS NOTES
Unable to get manual BP on LE, pt c/o dizziness, lightheadedness. Cardiology notified. New orders received.

## 2019-08-01 NOTE — PROGRESS NOTES
Pt seen and examined  Sent from the floor for worsening hypotension  On my exam resting comfortably with no complaints  SBP 60s  DW Cardiology and Pulmonology.  Getting STAT 2 units FFP  Going to Cardiac cath lab for pericardial drain now

## 2019-08-01 NOTE — CONSULTS
"Surgical Consultation    Date: 8/1/2019    Requesting Physician: Dr. Crum  PCP: Martha Light M.D.  Consulting Physician: Yandel Whaley M.D.    CC: Shortness of breath, dizziness    HPI: This is a 79 y.o. male who is presenting with shortness of breath and is dizziness that has been progressive.  He has a history of end-stage renal disease and is on chronic hemodialysis.  He also has atrial flutter and is recently been started on warfarin.  He has multiple other chronic medical conditions including COPD, CHF, moderate aortic stenosis.  Echocardiogram shows an EF of 30% with tamponade physiology.  A CT abdomen pelvis showed a large concentric pericardial effusion.  He denies any fever, chills, chest pain, nausea, vomiting, hematemesis, hemoptysis, diarrhea, constipation, neurologic symptoms.    Past Medical History:   Diagnosis Date   • Anesthesia     \"needs more sedation\" (can sometimes hear MD during procedure)    • Anticoagulation monitoring, special range    • Aortic stenosis     mod AS- concern for low flow severe AS with rEFof 30%   • Arterial leg ulcer ([facility]) 11/8/2018   • Atrial flutter ([facility]) 6/12/2019   • Basal cell carcinoma of left cheek 3/26/2015   • BPH 7/14/2009   • Bronchitis 12/25/2018   • CAD (coronary artery disease) 2/20/2014   • CATARACT     sanjuanita surgery complete   • CHF (congestive heart failure), NYHA class II, chronic, systolic ([facility]) E F30 in setting of atrial flutter 6/13/2019   • Chronic respiratory failure with hypoxia ([facility]) 5/8/2016   • CKD (chronic kidney disease) stage 4, GFR 15-29 ml/min ([facility]) 1/15/2010    end stage renal disease   • COPD (chronic obstructive pulmonary disease) ([facility])     wears 2.5 L o2 sometimes when he sleeps   • Detached retina    • [facility]   • EMPHYSEMA    • Gout    • Heart burn     occas   • Heart murmur     maria esther lee cardiologist   • High cholesterol    • Hypertension    • Indigestion     occas   • Kidney cyst    • Leg pain, " bilateral 8/10/2015   • On supplemental oxygen therapy    • Peripheral vascular disease ([facility]) 8/10/2015   • Pneumonia    • Primary insomnia 3/22/2018   • Proteinuria    • PVD (peripheral vascular disease) (HCC)    • Sleep apnea     O2 PER CANULA  AT NIGHT 2l/m       Past Surgical History:   Procedure Laterality Date   • JUSTIN  6/13/2019    Procedure: ECHOCARDIOGRAM, TRANSESOPHAGEAL;  Surgeon: Harvinder Hoang M.D.;  Location: [facility];  Service: Cardiac   • CARDIOVERSION  6/13/2019    Procedure: CARDIOVERSION;  Surgeon: Harvinder Hoang M.D.;  Location: [facility];  Service: Cardiac   • AV FISTULA CREATION Right 2/21/2019    Procedure: AV FISTULA CREATION - ARM;  Surgeon: David Cason M.D.;  Location: SURGERY [facility];  Service: General   • FEMORAL ENDARTERECTOMY Left 1/25/2019    Procedure: FEMORAL ENDARTERECTOMY;  Surgeon: David Cason M.D.;  Location: SURGERY [facility];  Service: General   • FEMORAL POPLITEAL BYPASS Left 1/25/2019    Procedure: LEFT FEMORAL POPLITEAL POLYTETRAFLUOROETHYLENE ePTFE(PROPATEN VASCULAR GRAFT) BYPASS;  Surgeon: David Cason M.D.;  Location: SURGERY [facility];  Service: General   • ANGIOGRAM Left 1/25/2019    Procedure: LEFT LEG ANGIOGRAM;  Surgeon: David Cason M.D.;  Location: SURGERY [facility];  Service: General   • ENDOSCOPY PROCEDURE  3/21/2018    Procedure: ENDOSCOPY PROCEDURE/UPPER;  Surgeon: Enrique Child D.O.;  Location: [facility];  Service: Gastroenterology   • COLONOSCOPY - ENDO  8/15/2016    Procedure: COLONOSCOPY - ENDO;  Surgeon: Mane Whatley M.D.;  Location: ENDOSCOPY [facility];  Service:    • GASTROSCOPY WITH BIOPSY  8/13/2016    Procedure: GASTROSCOPY WITH BIOPSY;  Surgeon: Jorge Leavitt M.D.;  Location: ENDOSCOPY [facility];  Service:    • RECOVERY  12/23/2015    Procedure: VASCULAR CASE-CASON-LEFT ARM FISTULOGRAM WITH ANGIOPLASTY;  Surgeon:  [facility];  [place] [facility] [place]  [place]:    • RECOVERY  3/24/2015    Performed by [facility] [place] [facility] [place]   • RECOVERY  7/29/2014    Performed by Ir[facility]Recovery Surgery at SURGERY SAME DAY [facility] ORS   • RECOVERY  3/24/2014    Performed by [facility] at SURGERY [facility]   • RECOVERY  12/17/2013    Performed by Ir[facility]Recovery Surgery at SURGERY SAME DAY [facility] ORS   • RECOVERY  7/2/2013    Performed by [facility] at SURGERY SAME DAY [facility] ORS   • AV FISTULA THROMBOLYSIS  7/2/2013    Performed by David Cason M.D. at SURGERY [facility] ORS   • RECOVERY  1/29/2013    Performed by Ir[facility]Recovery Surgery at SURGERY SAME DAY [facility] ORS   • RECOVERY  7/23/2012    Performed by SURGERY, IR-RECOVERY at SURGERY SAME DAY [facility] ORS   • VITRECTOMY POSTERIOR  10/11/2011    Performed by NAHUM GE at SURGERY SAME DAY [facility] ORS   • RECOVERY  8/12/2011    Performed by SURGERY, IR-RECOVERY at SURGERY SAME DAY [facility] ORS   • VITRECTOMY POSTERIOR  1/18/2011    Performed by NAHUM GE at SURGERY SAME DAY [facility] ORS   • SCLERAL BUCKLING  1/18/2011    Performed by NAHUM GE at SURGERY SAME DAY [facility] ORS   • AV FISTULOGRAM  9/17/2010    Performed by DAVID CASON at SURGERY [facility] ORS   • ANGIOPLASTY BALLOON  9/17/2010    Performed by DAVID CASON at SURGERY [facility] ORS   • AV FISTULOGRAM  7/23/2010    Performed by DAVID CASON at SURGERY [facility] ORS   • ANGIOPLASTY BALLOON  7/23/2010    Performed by DAVID CASON at SURGERY [facility] ORS   • AV FISTULA REVISION  2/19/2010    Performed by WILLIE HATCH at SURGERY [facility] ORS   • AV FISTULA CREATION  2/12/2010    Performed by DAVID CASON at SURGERY [facility] ORS   • CATARACT PHACO WITH IOL  4/8/08    Performed by STACEY PHILLIPS at SURGERY SAME DAY [facility] ORS   • OTHER ORTHOPEDIC SURGERY  1998    right  toe for facititis       Current Facility-Administered Medications   Medication Dose Route Frequency Provider Last Rate Last Dose   • amiodarone (CORDARONE) tablet 200 mg  200 mg Oral DAILY Jonathan Shetty M.D.   200 mg at 08/01/19 0624   • calcium acetate (PHOS-LO) 667 MG tablet 2,001 mg  2,001 mg Oral TID WITH MEALS Jonathan Shetty M.D.   Stopped at 08/01/19 0730   • doxazosin (CARDURA) tablet 4 mg  4 mg Oral Q EVENING Jonathan Sehtty M.D.   Stopped at 07/31/19 1800   • levalbuterol (XOPENEX) 1.25 MG/3ML nebulizer solution 1.25 mg  1.25 mg Nebulization Q4H PRN (RT) Jonathan Shetty M.D.       • metoprolol SR (TOPROL XL) tablet 150 mg  150 mg Oral DAILY Jonathan Shetty M.D.   150 mg at 08/01/19 0624   • omeprazole (PRILOSEC) capsule 40 mg  40 mg Oral EVERTONM Jonathan Shetty M.D.   40 mg at 08/01/19 0624   • pravastatin (PRAVACHOL) tablet 20 mg  20 mg Oral Nightly Jonathan Shetty M.D.   20 mg at 07/29/19 1946   • tamsulosin (FLOMAX) capsule 0.8 mg  0.8 mg Oral QAM Jonathan Shetty M.D.   0.8 mg at 08/01/19 0625   • torsemide (DEMADEX) tablet 30 mg  30 mg Oral DAILY Jonathan Shetty M.D.   30 mg at 08/01/19 0625   • traZODone (DESYREL) tablet 300 mg  300 mg Oral Nightly Jonathan Shetty M.D.       • acetaminophen (TYLENOL) tablet 650 mg  650 mg Oral Q6HRS PRN Jonathan Shetty M.D.   650 mg at 07/31/19 2207   • Pharmacy Consult Request ...Pain Management Review 1 Each  1 Each Other PHARMACY TO DOSE Jonathan Shetty M.D.        And   • oxyCODONE immediate-release (ROXICODONE) tablet 2.5 mg  2.5 mg Oral Q3HRS PRN Jonathan Shetty M.D.        And   • oxyCODONE immediate-release (ROXICODONE) tablet 5 mg  5 mg Oral Q3HRS PRN Jonathan Shetty M.D.   5 mg at 07/31/19 2207    And   • morphine (pf) 4 mg/ml injection 2 mg  2 mg Intravenous Q3HRS PRN Jonathan Shetty M.D.   2 mg at 07/29/19 2025   • ondansetron (ZOFRAN) syringe/vial injection 4 mg  4 mg Intravenous Q4HRS PRN Jonathan Shetty M.D.       • ondansetron (ZOFRAN ODT) dispertab 4 mg   4 mg Oral Q4HRS PRN Jonathan Shetty M.D.       • senna-docusate (PERICOLACE or SENOKOT S) 8.6-50 MG per tablet 2 Tab  2 Tab Oral BID Jonathan Shetty M.D.   2 Tab at 08/01/19 0624    And   • polyethylene glycol/lytes (MIRALAX) PACKET 1 Packet  1 Packet Oral QDAY PRN Jonathan Shetty M.D.        And   • magnesium hydroxide (MILK OF MAGNESIA) suspension 30 mL  30 mL Oral QDAY PRN Jonathan Shetty M.D.        And   • bisacodyl (DULCOLAX) suppository 10 mg  10 mg Rectal QDAY PRN Jonathan Shetty M.D.       • MD Alert...Warfarin per Pharmacy   Other PHARMACY TO DOSE Jonathan Shetty M.D.       • umeclidinium-vilanterol (ANORO ELLIPTA) inhaler 1 Puff  1 Puff Inhalation QDAILY (RT) Jonathan Shetty M.D.   1 Puff at 07/31/19 1459    And   • fluticasone (FLOVENT HFA) 44 MCG/ACT inhaler 88 mcg  2 Puff Inhalation QDAILY (RT) Jonathan Shetty M.D.   88 mcg at 07/31/19 1458   • lidocaine (XYLOCAINE) 1 % injection 1 mL  1 mL Other DIALYSIS PRN Long Larson M.D.   1 mL at 07/31/19 0745   • heparin injection 1,500 Units  1,500 Units Intravenous DIALYSIS PRN Long Larson M.D.   1,500 Units at 07/31/19 0755       Social History     Socioeconomic History   • Marital status:      Spouse name: Not on file   • Number of children: Not on file   • Years of education: Not on file   • Highest education level: Not on file   Occupational History   • Not on file   Social Needs   • Financial resource strain: Not on file   • Food insecurity:     Worry: Not on file     Inability: Not on file   • Transportation needs:     Medical: Not on file     Non-medical: Not on file   Tobacco Use   • Smoking status: Former Smoker     Packs/day: 1.00     Years: 40.00     Pack years: 40.00     Types: Cigarettes     Last attempt to quit: 1/1/2009     Years since quitting: 10.5   • Smokeless tobacco: Never Used   • Tobacco comment: 1 pk a day for 35 yrs, QUIT JAN 1 2010   Substance and Sexual Activity   • Alcohol use: No     Alcohol/week: 0.0 oz   •  Drug use: No   • Sexual activity: Never     Partners: Female     Comment: , two sons, 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   Lifestyle   • Physical activity:     Days per week: Not on file     Minutes per session: Not on file   • Stress: Not on file   Relationships   • Social connections:     Talks on phone: Not on file     Gets together: Not on file     Attends Spiritism service: Not on file     Active member of club or organization: Not on file     Attends meetings of clubs or organizations: Not on file     Relationship status: Not on file   • Intimate partner violence:     Fear of current or ex partner: Not on file     Emotionally abused: Not on file     Physically abused: Not on file     Forced sexual activity: Not on file   Other Topics Concern   • Not on file   Social History Narrative   • Not on file       Family History   Problem Relation Age of Onset   • Stroke Mother    • Hypertension Mother    • Lung Disease Father         Emphysema, resp failure   • Genitourinary () Problems Maternal Aunt         hematuria   • Hypertension Brother        Allergies:  Patient has no known allergies.    Review of Systems:  Negative except as noted above in the HPI on 10 point review    Physical Exam:  /62   Pulse (!) 116   Temp 36.2 °C (97.1 °F) (Temporal)   Resp 16   Wt 72.6 kg (160 lb 0.9 oz)   SpO2 92%     Constitutional: he is oriented to person, place, and time.  he appears thin and mildly fatigued, but well-developed. No acute distress.   Head: Normocephalic and atraumatic.   Neck: Normal range of motion. Neck supple. No significant JVD present. No tracheal deviation present.   Cardiovascular: Tachycardia, irregular  Pulmonary/Chest: Breathing is nonlabored at rest on room air, no stridor, no respiratory distress  Abdominal: Soft, nontender, nondistended.   Musculoskeletal: Normal range of motion. he exhibits no edema and no tenderness.   Neurological: he is alert and oriented to person, place, and time.  No  gross motor or sensory deficit  Extremities: Thin, evidence of prior vascular access surgery  Skin: Skin is warm and dry. No rash noted. he is not diaphoretic. No erythema. No pallor.   Psychiatric: he has a normal mood and affect.  Behavior is normal.       Labs:  Recent Labs     07/30/19  0505 08/01/19  0700   WBC 9.6 9.4   RBC 2.99* 2.86*   HEMOGLOBIN 9.2* 8.9*   HEMATOCRIT 29.7* 28.9*   MCV 99.3* 101.0*   MCH 30.8 31.1   MCHC 31.0* 30.8*   RDW 55.7* 56.4*   PLATELETCT 188 239   MPV 10.8 10.7     Recent Labs     07/30/19  0505  07/31/19  0241 07/31/19  1253 08/01/19  0700   SODIUM 133*  --  135  --  134*   POTASSIUM 6.9*   < > 6.4* 5.3 5.8*   CHLORIDE 93*  --  92*  --  90*   CO2 15*  --  15*  --  16*   GLUCOSE 101*  --  65  --  70   BUN 90*  --  94*  --  90*   CREATININE 10.01*  --  9.16*  --  9.36*   CALCIUM 9.5  --  9.4  --  9.5    < > = values in this interval not displayed.     Recent Labs     07/31/19  0241 07/31/19  1308 08/01/19  0700   INR 3.42* 3.02* 2.35*     Recent Labs     07/31/19  0241 07/31/19  1308 08/01/19  0700   INR 3.42* 3.02* 2.35*       Radiology:  DX-CHEST-LIMITED (1 VIEW)   Final Result      1.  No evidence of pneumothorax following RIGHT neck catheter placement   2.  Persistently enlarged cardiac silhouette   3.  Atherosclerosis      EC-ECHOCARDIOGRAM LTD W/O CONT   Final Result      CT-ABDOMEN-PELVIS W/O   Final Result      No CT evidence of acute inflammatory process in the abdomen or pelvis      Very large pericardial effusion has increased from 2016      Small right pleural effusion has enlarged      Aortic valvular calcifications indicating aortic stenosis. There is also severe coronary disease      Atrophic kidneys with cysts. Hepatic cysts are also seen.      Moderate distal colonic diverticulosis without evidence of diverticulitis      US-HEMODIALYSIS GRAFT DUPLEX COMP UPPER EXTREMITY   Final Result      [id number] VIEW   Final Result      1.  No evidence of bowel obstruction.    2.  Abdominal aortic and mesenteric vascular calcifications.      DX-CHEST-PORTABLE (1 VIEW)   Final Result      1.  Stable cardiomegaly.   2.  No acute abnormality.          Echocardiogram 7/31/2019:  Transthoracic  Echo Report  CONCLUSIONS  Large pericardial effusion with evidence of hemodynamic compromise.   Significant inflow variation is noted across the mitral valve. No clear   RA/RV diastolic collapse. IVC is plethoric. Concern for possible   tamponade.     Assessment: This is a 79 y.o. male with end-stage renal disease on hemodialysis, CHF, aortic stenosis, who was admitted for shortness of breath and found to have enlarging concentric pericardial effusion which is thought to be related to his renal disease.  Echo yesterday did show evidence of hemodynamic compromise, although he is clinically stable.  I was asked to evaluate for thoracoscopic pericardial window.      Recommendations:   -I do think that Mr. Castelan would benefit from a VATS pericardial effusion, however, given recent echo evidence of hemodynamic compromise with possible tamponade physiology, and in the setting of his low ejection fraction and aortic stenosis, I would recommend pericardial drainage prior to surgery.  This is because the loss of vascular tone with induction of anesthesia could result in hemodynamic collapse.  -The patient also has an elevated INR due to his warfarin, which is been stopped.  His INR should be less than 1.5 at the time of surgery.    -Again, I would be happy to perform a VATS pericardial window once he has a drain in place, and he is not coagulopathic.  I discussed the procedure as well as the risk, benefits, and alternatives with the patient.  Risks include but are not limited to bleeding, infection, damage to surrounding structures, need for further procedures, failure to achieve goals of procedure, significant dysrhythmia, MI, pneumonia, death.  -I will continue to follow      Yandel Jefferson  [facility]  [phone]

## 2019-08-01 NOTE — PROGRESS NOTES
Cardiology Follow Up Progress Note    Date of Service  8/1/2019    Attending Physician  Yusuf Douglas M.D.    Reason for consultation   Large pericardial effusion hemodynamically significant, hypotensive, received emergent dialysis, on warfarin received FFP and vitamin K      In addition patient was scheduled as an outpatient for A. fib/flutter ablation which now needs to be postponed      History of     End-stage renal disease on hemodialysis, dyslipidemia, hypertension, paroxysmal A. fib/flutter, anemia, COPD    Admitted for     Presents post hemodialysis complaining of dyspnea, abdominal pain, chills, lightheaded, found to be volume overloaded with hyperkalemia received emergent    Interim Events    8/1/19 hemodynamically stable, blood pressure stable, sitting up in a chair, complains of fatigue, no chest pain no palpitation, atrial flutter rate one teens, remains n.p.o., appreciate surgery input    Review of Systems  Review of Systems   Constitutional: Positive for fatigue.   Respiratory: Negative for apnea, cough, choking, chest tightness, shortness of breath and wheezing.    Cardiovascular: Negative for chest pain and leg swelling.       Vital signs in last 24 hours  Temp:  [36 °C (96.8 °F)-36.6 °C (97.9 °F)] 36.2 °C (97.1 °F)  Pulse:  [] 116  Resp:  [16-18] 16  BP: ()/() 102/62  SpO2:  [89 %-95 %] 92 %    Physical Exam  Physical Exam   Constitutional: He is oriented to person, place, and time.   HENT:   Head: Normocephalic.   Eyes: Conjunctivae are normal.   Neck: No JVD present. No thyromegaly present.   Cardiovascular: A regularly irregular rhythm present. Tachycardia present.   Pulses:       Carotid pulses are 2+ on the right side, and 2+ on the left side.       Radial pulses are 2+ on the right side, and 2+ on the left side.   Pulmonary/Chest: He has no wheezes.   Abdominal: Soft.   Musculoskeletal: He exhibits no edema.   Neurological: He is alert and oriented to person, place, and  time.   Skin: Skin is warm and dry.             Assessment/Plan    Pericardial tamponade   -Appreciate thoracic surgeon input  -Remains n.p.o.  -We will plan for pericardial drainage  -INR remains elevated  -Reverse coagulopathy with FFP, vitamin K  -Currently blood pressure stable      Atrial flutter  -Heart rate one teens  -On Toprol   -Hold warfarin pending surgery    -Denies palpitations  -Was previously scheduled to undergo ablation  -We will plan to reschedule this at a later date      Hypertension  -Hypotension resolved  Hemodynamically stable today      End-stage renal disease  -Received emergent dialysis 7/31/19  -Appreciate nephrology input  -Continue with torsemide

## 2019-08-01 NOTE — PROCEDURES
PROCEDURE: Pericardiocentesis.  INDICATION: Pericardial effusion with cardiac tamponade.  PROCEDURE: After informed consent was obtained, the patient was brought to the cardiac catheterization laboratory where his prepped draped in just a usual manner.  Using micropuncture technique with the assistance of fluoroscopy, ultrasound and saline contrast the pericardial space was entered and and a pigtail catheter was inserted after which 1300 cc of hemorrhagic pericardial fluid was removed.  The patient remained hemodynamically stable.  The pigtail catheter was secured.  Postprocedure chest x-ray pending.

## 2019-08-01 NOTE — ASSESSMENT & PLAN NOTE
Overall improved  Now more accurate blood pressure readings as he can have BP cuff in the left upper extremity  Continue midodrine

## 2019-08-01 NOTE — PROGRESS NOTES
Pt returned from cath lab on monitor at 1625. Pt resting in bed with call light within reach. No complaints of pain. Wife (robert) at bedside.

## 2019-08-02 ENCOUNTER — APPOINTMENT (OUTPATIENT)
Dept: RADIOLOGY | Facility: MEDICAL CENTER | Age: 79
DRG: 628 | End: 2019-08-02
Attending: INTERNAL MEDICINE
Payer: MEDICARE

## 2019-08-02 ENCOUNTER — APPOINTMENT (OUTPATIENT)
Dept: CARDIOLOGY | Facility: MEDICAL CENTER | Age: 79
DRG: 628 | End: 2019-08-02
Attending: NURSE PRACTITIONER
Payer: MEDICARE

## 2019-08-02 LAB
ALBUMIN SERPL BCP-MCNC: 3.3 G/DL (ref 3.2–4.9)
ALBUMIN/GLOB SERPL: 1.1 G/DL
ALP SERPL-CCNC: 148 U/L (ref 30–99)
ALT SERPL-CCNC: 497 U/L (ref 2–50)
ANION GAP SERPL CALC-SCNC: 21 MMOL/L (ref 0–11.9)
AST SERPL-CCNC: 156 U/L (ref 12–45)
BILIRUB SERPL-MCNC: 1.1 MG/DL (ref 0.1–1.5)
BUN SERPL-MCNC: 104 MG/DL (ref 8–22)
CALCIUM SERPL-MCNC: 9.1 MG/DL (ref 8.5–10.5)
CHLORIDE SERPL-SCNC: 94 MMOL/L (ref 96–112)
CO2 SERPL-SCNC: 20 MMOL/L (ref 20–33)
CREAT SERPL-MCNC: 9.46 MG/DL (ref 0.5–1.4)
ERYTHROCYTE [DISTWIDTH] IN BLOOD BY AUTOMATED COUNT: 54.9 FL (ref 35.9–50)
GLOBULIN SER CALC-MCNC: 3 G/DL (ref 1.9–3.5)
GLUCOSE SERPL-MCNC: 73 MG/DL (ref 65–99)
GRAM STN SPEC: NORMAL
HCT VFR BLD AUTO: 26.1 % (ref 42–52)
HGB BLD-MCNC: 8 G/DL (ref 14–18)
INR PPP: 2.04 (ref 0.87–1.13)
LV EJECT FRACT  99904: 55
LV EJECT FRACT MOD 2C 99903: 73.92
LV EJECT FRACT MOD 4C 99902: 73.23
LV EJECT FRACT MOD BP 99901: 72.62
MCH RBC QN AUTO: 30.5 PG (ref 27–33)
MCHC RBC AUTO-ENTMCNC: 30.7 G/DL (ref 33.7–35.3)
MCV RBC AUTO: 99.6 FL (ref 81.4–97.8)
PLATELET # BLD AUTO: 203 K/UL (ref 164–446)
PMV BLD AUTO: 10.6 FL (ref 9–12.9)
POTASSIUM SERPL-SCNC: 5.4 MMOL/L (ref 3.6–5.5)
PROT SERPL-MCNC: 6.3 G/DL (ref 6–8.2)
PROTHROMBIN TIME: 23.7 SEC (ref 12–14.6)
RBC # BLD AUTO: 2.62 M/UL (ref 4.7–6.1)
RHODAMINE-AURAMINE STN SPEC: NORMAL
SIGNIFICANT IND 70042: NORMAL
SIGNIFICANT IND 70042: NORMAL
SITE SITE: NORMAL
SITE SITE: NORMAL
SODIUM SERPL-SCNC: 135 MMOL/L (ref 135–145)
SOURCE SOURCE: NORMAL
SOURCE SOURCE: NORMAL
WBC # BLD AUTO: 7.1 K/UL (ref 4.8–10.8)

## 2019-08-02 PROCEDURE — 85027 COMPLETE CBC AUTOMATED: CPT

## 2019-08-02 PROCEDURE — 99291 CRITICAL CARE FIRST HOUR: CPT | Performed by: INTERNAL MEDICINE

## 2019-08-02 PROCEDURE — 700102 HCHG RX REV CODE 250 W/ 637 OVERRIDE(OP): Performed by: INTERNAL MEDICINE

## 2019-08-02 PROCEDURE — 306565 RIGID MIT RESTRAINT(PAIR): Performed by: INTERNAL MEDICINE

## 2019-08-02 PROCEDURE — 770022 HCHG ROOM/CARE - ICU (200)

## 2019-08-02 PROCEDURE — 36620 INSERTION CATHETER ARTERY: CPT

## 2019-08-02 PROCEDURE — A9270 NON-COVERED ITEM OR SERVICE: HCPCS | Performed by: INTERNAL MEDICINE

## 2019-08-02 PROCEDURE — 99232 SBSQ HOSP IP/OBS MODERATE 35: CPT | Performed by: INTERNAL MEDICINE

## 2019-08-02 PROCEDURE — 99233 SBSQ HOSP IP/OBS HIGH 50: CPT | Performed by: HOSPITALIST

## 2019-08-02 PROCEDURE — 700101 HCHG RX REV CODE 250

## 2019-08-02 PROCEDURE — 36620 INSERTION CATHETER ARTERY: CPT | Performed by: INTERNAL MEDICINE

## 2019-08-02 PROCEDURE — 80053 COMPREHEN METABOLIC PANEL: CPT

## 2019-08-02 PROCEDURE — 700111 HCHG RX REV CODE 636 W/ 250 OVERRIDE (IP): Performed by: INTERNAL MEDICINE

## 2019-08-02 PROCEDURE — 90935 HEMODIALYSIS ONE EVALUATION: CPT | Performed by: INTERNAL MEDICINE

## 2019-08-02 PROCEDURE — 700111 HCHG RX REV CODE 636 W/ 250 OVERRIDE (IP): Performed by: HOSPITALIST

## 2019-08-02 PROCEDURE — 93990 DOPPLER FLOW TESTING: CPT

## 2019-08-02 PROCEDURE — 85610 PROTHROMBIN TIME: CPT

## 2019-08-02 PROCEDURE — 04HY32Z INSERTION OF MONITORING DEVICE INTO LOWER ARTERY, PERCUTANEOUS APPROACH: ICD-10-PCS | Performed by: INTERNAL MEDICINE

## 2019-08-02 PROCEDURE — 90935 HEMODIALYSIS ONE EVALUATION: CPT

## 2019-08-02 PROCEDURE — 93306 TTE W/DOPPLER COMPLETE: CPT | Mod: 26 | Performed by: INTERNAL MEDICINE

## 2019-08-02 PROCEDURE — 93306 TTE W/DOPPLER COMPLETE: CPT

## 2019-08-02 RX ORDER — MIDODRINE HYDROCHLORIDE 5 MG/1
15 TABLET ORAL
Status: DISCONTINUED | OUTPATIENT
Start: 2019-08-02 | End: 2019-08-11 | Stop reason: HOSPADM

## 2019-08-02 RX ORDER — ADENOSINE 3 MG/ML
INJECTION, SOLUTION INTRAVENOUS
Status: DISCONTINUED
Start: 2019-08-02 | End: 2019-08-02

## 2019-08-02 RX ORDER — FLUTICASONE PROPIONATE 44 UG/1
2 AEROSOL, METERED RESPIRATORY (INHALATION) DAILY
Status: DISCONTINUED | OUTPATIENT
Start: 2019-08-03 | End: 2019-08-11 | Stop reason: HOSPADM

## 2019-08-02 RX ORDER — ALPRAZOLAM 0.5 MG/1
0.5 TABLET ORAL NIGHTLY PRN
Status: DISCONTINUED | OUTPATIENT
Start: 2019-08-02 | End: 2019-08-11 | Stop reason: HOSPADM

## 2019-08-02 RX ORDER — LIDOCAINE HYDROCHLORIDE 10 MG/ML
INJECTION, SOLUTION INFILTRATION; PERINEURAL
Status: COMPLETED
Start: 2019-08-02 | End: 2019-08-02

## 2019-08-02 RX ORDER — LORAZEPAM 2 MG/ML
1 INJECTION INTRAMUSCULAR ONCE
Status: COMPLETED | OUTPATIENT
Start: 2019-08-02 | End: 2019-08-02

## 2019-08-02 RX ORDER — DIPHENHYDRAMINE HYDROCHLORIDE 50 MG/ML
25 INJECTION INTRAMUSCULAR; INTRAVENOUS NIGHTLY PRN
Status: DISCONTINUED | OUTPATIENT
Start: 2019-08-02 | End: 2019-08-11 | Stop reason: HOSPADM

## 2019-08-02 RX ADMIN — MIDODRINE HYDROCHLORIDE 15 MG: 5 TABLET ORAL at 17:38

## 2019-08-02 RX ADMIN — HYDROCORTISONE SODIUM SUCCINATE 50 MG: 100 INJECTION, POWDER, FOR SOLUTION INTRAMUSCULAR; INTRAVENOUS at 12:44

## 2019-08-02 RX ADMIN — HYDROCORTISONE SODIUM SUCCINATE 50 MG: 100 INJECTION, POWDER, FOR SOLUTION INTRAMUSCULAR; INTRAVENOUS at 17:39

## 2019-08-02 RX ADMIN — LORAZEPAM 1 MG: 2 INJECTION INTRAMUSCULAR; INTRAVENOUS at 03:28

## 2019-08-02 RX ADMIN — DIPHENHYDRAMINE HYDROCHLORIDE 25 MG: 50 INJECTION INTRAMUSCULAR; INTRAVENOUS at 01:14

## 2019-08-02 RX ADMIN — LIDOCAINE HYDROCHLORIDE: 10 INJECTION, SOLUTION INFILTRATION; PERINEURAL at 01:07

## 2019-08-02 ASSESSMENT — ENCOUNTER SYMPTOMS
WEIGHT LOSS: 0
HEMOPTYSIS: 0
VOMITING: 0
PALPITATIONS: 0
EYES NEGATIVE: 1
WHEEZING: 0
FEVER: 0
COUGH: 0
NAUSEA: 0
SINUS PAIN: 0
CHILLS: 0
ORTHOPNEA: 1
ABDOMINAL PAIN: 0

## 2019-08-02 NOTE — PROGRESS NOTES
HD treatment today using LFAAVF.Treatment tolerated well.On levo gtt for bp support (6 mcg/min),didnt require titration.Net UF removed 2 L.Report given to primary Rn.

## 2019-08-02 NOTE — PROGRESS NOTES
2350 Updated Dr. Gonda about still not able to obtain blood pressure and unable to titrate Levophed.     0000 Dr. Gonda and patient's son, Marquis, at bedside discussing arterial line placement. Decision made to place arterial line.

## 2019-08-02 NOTE — ASSESSMENT & PLAN NOTE
Reversed, secondary to pericardial effusion  Not yet restarted secondary to bloody pericardial effusion  Discussed with cardiology for follow-up  rechecking limited echo

## 2019-08-02 NOTE — ASSESSMENT & PLAN NOTE
Pump vs BRITTON vs less likely sepsis or volume  Suspect due to BRITTON:  Midodrine   Repeat Echo showing relatively good function

## 2019-08-02 NOTE — PROCEDURES
Procedure Note    Date: 8/2/2019  Time: 0045    Procedure: Arterial Line placement  Site: R femoral artery    Indication: Shock requiring continuous BP monitoring, frequent ABG monitoring  Consent: Informed consent obtained from patient or designated decision maker after explaining the benefits/risks of the procedure including but not limited to bleeding, infection, nerve or other deep structure injury, or failure of placement. Patient or surrogate expressed understanding and agreement.    Procedure: After obtaining consent, a time-out was performed. Patient positioned, prepped, and draped in sterile fashion.  5 mL of local anesthetic injected (1% lidocaine without epinephrine) achieving appropriate comfort level for patient. Artery was located using realtime US/physical exam. A 18 gauge catheter was placed using Seldinger technique. Appropriate arterial blood visualized from the catheter and the arterial waveform confirmed on the monitor. Line secured and dressed in place. Patient tolerated procedure well without any difficulties and left in care of bedside nurse.     EBL: minimal  Complications: none    Jeremy Gonda, MD  Critical Care Medicine

## 2019-08-02 NOTE — PROGRESS NOTES
Nephrology Daily Progress Note    Date of Service  8/1/2019    Chief Complaint  79 y.o. male with ESRD/HD,admitted 7/28/2019 with worsening SOB, hyperkalemia    Interval Problem Update    Hypotensive -transferred to ICU  Large pericardia effusion -scheduled for pericardiocenthesis  Hyperkalemia  Daily dialysis    Review of Systems  Review of Systems   Constitutional: Negative for chills, fever, malaise/fatigue and weight loss.   HENT: Negative for congestion, hearing loss and sinus pain.    Eyes: Negative.    Respiratory: Negative for cough, hemoptysis, shortness of breath and wheezing.    Cardiovascular: Positive for orthopnea. Negative for chest pain, palpitations and leg swelling.   Gastrointestinal: Negative for abdominal pain, diarrhea, nausea and vomiting.   Skin: Negative.    All other systems reviewed and are negative.       Physical Exam  Temp:  [35.6 °C (96 °F)-36.6 °C (97.9 °F)] 35.9 °C (96.7 °F)  Pulse:  [] 93  Resp:  [8-28] 8  BP: ()/(29-62) 103/59  SpO2:  [91 %-99 %] 99 %    Physical Exam   Constitutional: He is oriented to person, place, and time. He appears well-developed and well-nourished. No distress.   HENT:   Head: Normocephalic and atraumatic.   Nose: Nose normal.   Mouth/Throat: Oropharynx is clear and moist.   Eyes: Pupils are equal, round, and reactive to light. Conjunctivae and EOM are normal.   Neck: Normal range of motion. Neck supple. No thyromegaly present.   Cardiovascular: Normal rate and regular rhythm. Exam reveals no gallop and no friction rub.   Pulmonary/Chest: Effort normal and breath sounds normal. No respiratory distress. He has no wheezes. He has no rales.   Abdominal: Soft. Bowel sounds are normal. He exhibits no distension and no mass. There is no tenderness. There is no guarding.   Musculoskeletal: He exhibits edema.   Neurological: He is alert and oriented to person, place, and time. No cranial nerve deficit.   Skin: Skin is warm. No rash noted. No erythema.    Nursing note and vitals reviewed.      Fluids    Intake/Output Summary (Last 24 hours) at 8/1/2019 1800  Last data filed at 8/1/2019 1800  Gross per 24 hour   Intake 360 ml   Output 0 ml   Net 360 ml       Laboratory  Recent Labs     07/30/19  0505 08/01/19  0700   WBC 9.6 9.4   RBC 2.99* 2.86*   HEMOGLOBIN 9.2* 8.9*   HEMATOCRIT 29.7* 28.9*   MCV 99.3* 101.0*   MCH 30.8 31.1   MCHC 31.0* 30.8*   RDW 55.7* 56.4*   PLATELETCT 188 239   MPV 10.8 10.7     Recent Labs     07/30/19  0505  07/31/19  0241 07/31/19  1253 08/01/19  0700 08/01/19  1651   SODIUM 133*  --  135  --  134*  --    POTASSIUM 6.9*   < > 6.4* 5.3 5.8* 5.9*   CHLORIDE 93*  --  92*  --  90*  --    CO2 15*  --  15*  --  16*  --    GLUCOSE 101*  --  65  --  70  --    BUN 90*  --  94*  --  90*  --    CREATININE 10.01*  --  9.16*  --  9.36*  --    CALCIUM 9.5  --  9.4  --  9.5  --     < > = values in this interval not displayed.     Recent Labs     07/31/19  0241 07/31/19  1308 08/01/19  0700   INR 3.42* 3.02* 2.35*     No results for input(s): NTPROBNP in the last 72 hours.        Imaging  CT reviewed, ECHO reviewed, AVF US reviewed    Assessment/Plan  1.ESRD/HD-daily dialysis -will dialyzed tonight  2.Hypotensive - transferred to ICU-likely due to tamponade  -scheduled for paracenthesis  3.Electrolytes: hyperkalemia -correcting with 1 K bath -low K diet  4.Anemia: Hb stable  5.AVF -consulted  to evaluate  Recs; daily BMP             Daily dialysis             Renal/low K diet!

## 2019-08-02 NOTE — ASSESSMENT & PLAN NOTE
Coumadin prior to admission  Anticoagulation was reversed with prothrombin complex concentrate and vitamin K as well as FFP  Continue to hold anticoagulation

## 2019-08-02 NOTE — PROGRESS NOTES
Patient trying to get out of bed and pulling at arterial line. Dr. Gonda updated. New orders obtained. See MAR.

## 2019-08-02 NOTE — PROGRESS NOTES
Unable to obtain accurate blood pressure on patient due to bilateral fistulas and PVD. Attempted manual blood pressures. Dr. Gonda paged and updated on patient. Dr. Gonda on way to bedside.

## 2019-08-02 NOTE — PROGRESS NOTES
Monitor Summary-  Rhythm: A. Flutter with BBB  Rate: 70-1teens  --/.18/--      12 hour chart check

## 2019-08-02 NOTE — ASSESSMENT & PLAN NOTE
S/P emergent pericardiocentesis on 8/1 with resolution of tamponade  Pericardial drain removed on 8/3  Pericardial fluid cultures and cytology are negative  Suspect due to ESRD with uremia  Consider pericardial window if the effusion re-accumulates  Repeat echo

## 2019-08-02 NOTE — PROGRESS NOTES
Cardiology Follow Up Progress Note    Date of Service  8/2/2019    Attending Physician  Yusuf Douglas M.D.    Chief Complaint   Pericardial effusion with pericardiocentesis.    HPI  JAKE Castelan is a 79 y.o. male admitted 7/28/2019 with above.    Interim Events  800 cc fluid removed.    Review of Systems  Review of Systems   Unable to perform ROS: Patient unresponsive       Vital signs in last 24 hours  Temp:  [35.6 °C (96 °F)-35.9 °C (96.7 °F)] 35.9 °C (96.6 °F)  Pulse:  [] 78  Resp:  [8-28] 22  BP: ()/() 34/23  SpO2:  [89 %-100 %] 90 %    Physical Exam  Physical Exam   Constitutional: He appears cachectic.   HENT:   Head: Normocephalic and atraumatic.   Eyes: Pupils are equal, round, and reactive to light. Conjunctivae are normal.   Neck: Normal range of motion. Neck supple.   Cardiovascular: Normal rate and regular rhythm.   Pulmonary/Chest: Effort normal and breath sounds normal.   Abdominal: Soft. Bowel sounds are normal.   Musculoskeletal: Normal range of motion. He exhibits no edema.   Neurological:   Sedated.   Skin: Skin is warm and dry.       Lab Review  Lab Results   Component Value Date/Time    WBC 7.1 08/02/2019 04:30 AM    RBC 2.62 (L) 08/02/2019 04:30 AM    HEMOGLOBIN 8.0 (L) 08/02/2019 04:30 AM    HEMATOCRIT 26.1 (L) 08/02/2019 04:30 AM    MCV 99.6 (H) 08/02/2019 04:30 AM    MCH 30.5 08/02/2019 04:30 AM    MCHC 30.7 (L) 08/02/2019 04:30 AM    MPV 10.6 08/02/2019 04:30 AM      Lab Results   Component Value Date/Time    SODIUM 135 08/02/2019 04:30 AM    POTASSIUM 5.4 08/02/2019 04:30 AM    CHLORIDE 94 (L) 08/02/2019 04:30 AM    CO2 20 08/02/2019 04:30 AM    GLUCOSE 73 08/02/2019 04:30 AM     (HH) 08/02/2019 04:30 AM    CREATININE 9.46 (HH) 08/02/2019 04:30 AM    CREATININE 2.1 (H) 05/06/2009 10:08 AM      Lab Results   Component Value Date/Time    ASTSGOT 156 (H) 08/02/2019 04:30 AM    ALTSGPT 497 (H) 08/02/2019 04:30 AM     Lab Results   Component Value Date/Time     CHOLSTRLTOT 90 (L) 06/22/2017 07:55 AM    LDL 26 06/22/2017 07:55 AM    HDL 57 06/22/2017 07:55 AM    TRIGLYCERIDE 37 06/22/2017 07:55 AM    TROPONINT 197 (H) 07/29/2019 11:39 AM       No results for input(s): NTPROBNP in the last 72 hours.  Laboratory results reviewed.    Cardiac Imaging and Procedures Review    CARDIAC STUDIES AND PROCEDURES:       CTA OF CHEST (07/30/19)  No CT evidence of acute inflammatory process in the abdomen or pelvis  Very large pericardial effusion has increased from 2016  Small right pleural effusion has enlarged  Aortic valvular calcifications indicating aortic stenosis. There is also severe coronary disease  Atrophic kidneys with cysts. Hepatic cysts are also seen.  Moderate distal colonic diverticulosis without evidence of diverticulitis.    ECHOCARDIOGRAM CONCLUSIONS (08/01/19)  Intraoperative study performed during pericardiocentesis. Significant   pericardial effusion seen.  (study result reviewed)     ECHOCARDIOGRAM CONCLUSIONS (07/31/19)  Large pericardial effusion with evidence of hemodynamic compromise.   Significant inflow variation is noted across the mitral valve. No clear   RA/RV diastolic collapse. IVC is plethoric. Concern for possible tamponade.       ECHOCARDIOGRAM CONCLUSIONS (08/07/18)  Normal left ventricular chamber size.   Moderate concentric left ventricular hypertrophy (LVPW 1.3 cm).   Mildly reduced left ventricular systolic function.   Left ventricular ejection fraction is visually estimated to be 55%.   Moderate pericardial effusion without evidence of hemodynamic compromise.  Moderate aortic stenosis.  Compared to the images of the prior study done 5/2017-    there has been no significant change in the degree of AS or pericardial effusion. LA size has increased.      EKG performed on (08/01/19) was reviewed: EKG shows atrial flutter.  EKG performed on (07/30/2019) EKG shows atrial flutter with 2:1 block and right bundle-branch block.    TRANSESOPHAGEAL  ECHOCARDIOGRAM CONCLUSIONS by Harvinder Almonte (06/13/19)  No thrombus detected in the left atrial appendage or other atrial   structures.  Moderately reduced left ventricular systolic function.  Left ventricular ejection fraction is visually estimated to be 30%.  Global hypokinesis.  Diastolic function is difficult to assess with atrial fibrillation.  Reduced right ventricular systolic function.  Mild mitral regurgitation.  Moderate aortic stenosis.  Mild aortic insufficiency.  Moderate pericardial effusion WITHOUT evidence of hemodynamic compromise.  Compared to the images of the transthoracic echocardiogram dated   8/7/2018, the ejection fraction is further reduced previously low   normal.  It is difficult to compare the size of the pericardial   effusion due to technique but it was previously large.    Assessment/Plan    1.  Pericardial effusion-S/P pericardiocentesis (08/1/2019): He is clinically doing well with resolution of his effusion and tamponade. We will repeat an echocardiogram.  2.  Atrial flutter off of chronic anticoagulation: He is clinically doing well on anticoagulation and the ventricular rate is controlled.  3.  Aortic stenosis:  Moderate.  4.  Dilated cardiomyopathy:  Stable.  5.  Hypertension: Blood pressure has been low and supported on levophed.  6.  Hyperlipidemia:  Continue statin therapy.  7.  Peripheral vascular disease:  Stable without claudication.  8.  Right bundle branch block  9.  Chronic obstructive pulmonary disease, on O2 therapy.  10.  End-stage renal disease, on dialysis.    Thank you for allowing me to participate in the care of this patient.  I will continue to follow this patient    Please contact me with any questions.    Sunny Crum M.D.   Cardiologist, [facility]  [phone] - [phone]

## 2019-08-02 NOTE — ASSESSMENT & PLAN NOTE
He continues to have paroxysmal atrial flutter  Increase amiodarone, 200 mg twice daily  Continue metoprolol, 50 mg twice daily

## 2019-08-02 NOTE — PROGRESS NOTES
Pt blood sugar 39. Dr. Warner updated received order to bolus Dextrose. See MAR. Follow up [id number]

## 2019-08-02 NOTE — PROGRESS NOTES
Surgery    Pt s/p pericardial drain placement with evacuation of a significant amount of fluid.  He is moderately unstable on levophed.  I spoke to Dr Crum, who recommends holding off on window at this time, which I agree with given his clinical decline.  He will continue medial management & serial echocardiogram, and if he recurs he may be referred for elective VATS pericardial window when his effusion is not large or causing hemodynamic compromise.  I will sign off at this time.    Yandel Whaley M.D.  [facility]  [phone]\

## 2019-08-02 NOTE — PROGRESS NOTES
Hemodialysis ordered by Dr Guerra for 2hrs on 1K acid bath.  Treatment initiated at 1920 and ended at 2120.   BP was difficult to obtain during tx; pt with no complaints, no s/sx of distress; O2 sats 100% on room air.  No UF. Please see flowsheet for details.   Report given to staff RN, ZACH Gonzalez.    Pre and post tx, LUAAVF positive for bruit and thrill. Access needles removed; manual pressure held for 10mins over venous segment and 15mins over arterial segment; then sites covered with clean and dry dressing, CDI.

## 2019-08-02 NOTE — PROGRESS NOTES
"Critical Care Progress Note    Date of admission  7/28/2019    Chief Complaint  79 y.o. male admitted 7/28/2019 with pericardial effusion with tamponade    Hospital Course    \"79 y.o. male with past medical history of systolic congestive heart failure, atrial flutter on anticoagulation with Coumadin, coronary disease, ACS, end-stage renal disease on HD, COPD, hypercholesterolemia, hypertension, PVD who presented 7/28/2019 with shortness of breath and lightheadedness.  He was admitted for volume overload and hyperkalemia and was emergently dialyzed.  A CT scan of his abdomen was obtained on 7/30/2019 which showed a very large pericardial effusion and was followed by an echocardiogram 7/31/19 which showed evidence of significant inflow variation concerning for possible tamponade.  His INR was noted to be elevated at 3.42 and he was given K Centra, vitamin K with improvement to 3.02 then 2.35.  Cardiology planning a pericardiocentesis following administration of 2 units of FFP.  This afternoon the patient's blood pressure became worse with systolic blood pressures in the 60s and 50s.  He was transferred to the ICU and cardiology was recontacted for emergent pericardiocentesis.  At this time the patient has minimally symptomatic being lightheaded and epigastric/retrosternal pain.  Noninvasive blood pressure measurements are difficult to obtain due to the patient's bilateral upper extremity AV fistulas (left currently being used for HD and right being matured), all NIBP measurements are being obtained on his left lower extremity.  Thoracic surgery has also been consulted for pericardial window following drainage and correction of coagulopathy.  He denies any recent infectious symptoms.\"      Interval Problem Update  Reviewed last 24 hour events:   - delirious, in restraints.  Benadryl, Ativan and Haldol given overnight   - Neuro: AOx2   - HR: 70s-80s   - SBP: Levo 8, 100a   - GI: tolerating diet   - UOP: anuric   - " Poon: no   - Tm: afeb   - Lines: RIJ CVC, right femoral art   - PPx: GI not indicated, DVT contraindicated   - RA   - CXR (personally reviewed and compared to prior): no new   - INR 2    Review of Systems  Review of Systems   Unable to perform ROS: Mental acuity        Vital Signs for last 24 hours   Temp:  [35.6 °C (96 °F)-35.9 °C (96.7 °F)] 35.9 °C (96.6 °F)  Pulse:  [] 78  Resp:  [8-28] 22  BP: ()/() 34/23  SpO2:  [89 %-100 %] 90 %    Hemodynamic parameters for last 24 hours       Respiratory Information for the last 24 hours       Physical Exam   Physical Exam   Constitutional: He is oriented to person, place, and time. He appears well-developed and well-nourished. He appears distressed.   HENT:   Head: Normocephalic and atraumatic.   Right Ear: External ear normal.   Left Ear: External ear normal.   Mouth/Throat: Oropharynx is clear and moist.   Eyes: Conjunctivae and EOM are normal. No scleral icterus.   Neck: Neck supple. No JVD present. No tracheal deviation present.   Cardiovascular: Normal rate, regular rhythm and intact distal pulses.   Murmur heard.  Pulmonary/Chest: Effort normal and breath sounds normal. No respiratory distress.   Pericardial drain in place   Abdominal: Soft. Bowel sounds are normal. He exhibits no distension.   Musculoskeletal: Normal range of motion. He exhibits no edema or tenderness.   Right forearm AV fistula with palpable thrill, left forearm AV fistula with no palpable thrill   Neurological: He is alert and oriented to person, place, and time. No cranial nerve deficit. He exhibits normal muscle tone. Coordination normal.   Confused, input   Skin: Skin is warm and dry.   Psychiatric: His affect is angry. He is agitated. Cognition and memory are impaired. He expresses inappropriate judgment.   Nursing note and vitals reviewed.      Medications  Current Facility-Administered Medications   Medication Dose Route Frequency Provider Last Rate Last Dose   •  diphenhydrAMINE (BENADRYL) injection 25 mg  25 mg Intravenous HS PRN Jeremy M Gonda, M.D.   25 mg at 08/02/19 0114   • [START ON 8/3/2019] umeclidinium-vilanterol (ANORO ELLIPTA) inhaler 1 Puff  1 Puff Inhalation DAILY Deniz Warner Jr., D.O.        And   • [START ON 8/3/2019] fluticasone (FLOVENT HFA) 44 MCG/ACT inhaler 88 mcg  2 Puff Inhalation DAILY Deniz Warner Jr., D.O.       • glucose 4 g chewable tablet 16 g  16 g Oral Q15 MIN PRN Jey Sanches D.OLise        And   • DEXTROSE 10% BOLUS 250 mL  250 mL Intravenous Q15 MIN PRN Jey Sanches D.O.       • SODIUM CHLORIDE 0.9 % IV SOLN            • norepinephrine (LEVOPHED) 8 mg in  mL Infusion  0-30 mcg/min Intravenous Continuous Jeremy M Gonda, M.D. 15 mL/hr at 08/02/19 0528 8 mcg/min at 08/02/19 0528   • NOREPINEPHRINE BITARTRATE 1 MG/ML IV SOLN        Stopped at 08/02/19 0800   • SODIUM CHLORIDE 0.9 % IV SOLN            • amiodarone (CORDARONE) tablet 200 mg  200 mg Oral DAILY Jonathan Shetty M.D.   Stopped at 08/02/19 0600   • calcium acetate (PHOS-LO) 667 MG tablet 2,001 mg  2,001 mg Oral TID WITH MEALS Jonathan Shetty M.D.   Stopped at 08/02/19 0730   • levalbuterol (XOPENEX) 1.25 MG/3ML nebulizer solution 1.25 mg  1.25 mg Nebulization Q4H PRN (RT) Jonathan Shetty M.D.       • metoprolol SR (TOPROL XL) tablet 150 mg  150 mg Oral DAILY Jonathan Shetty M.D.   Stopped at 08/02/19 0600   • omeprazole (PRILOSEC) capsule 40 mg  40 mg Oral QAM Jonathan Shetty M.D.   Stopped at 08/02/19 0600   • pravastatin (PRAVACHOL) tablet 20 mg  20 mg Oral Nightly Jonathan Shetty M.D.   20 mg at 08/01/19 2212   • traZODone (DESYREL) tablet 300 mg  300 mg Oral Nightly Jonathan Shetty M.D.   300 mg at 08/01/19 2215   • acetaminophen (TYLENOL) tablet 650 mg  650 mg Oral Q6HRS PRN Jonathan Shetty M.D.   650 mg at 07/31/19 2207   • Pharmacy Consult Request ...Pain Management Review 1 Each  1 Each Other PHARMACY TO DOSE Jonathan Shetty M.D.        And   •  oxyCODONE immediate-release (ROXICODONE) tablet 2.5 mg  2.5 mg Oral Q3HRS PRJARRELL Shetty M.D.        And   • oxyCODONE immediate-release (ROXICODONE) tablet 5 mg  5 mg Oral Q3HRS PRJARRELL Shetty M.D.   5 mg at 08/01/19 2327    And   • morphine (pf) 4 mg/ml injection 2 mg  2 mg Intravenous Q3HRS PRJARRELL Shetty M.D.   2 mg at 07/29/19 2025   • ondansetron (ZOFRAN) syringe/vial injection 4 mg  4 mg Intravenous Q4HRS PRJARRELL Shetty M.D.       • ondansetron (ZOFRAN ODT) dispertab 4 mg  4 mg Oral Q4HRS PRJARRELL Shetty M.D.       • senna-docusate (PERICOLACE or SENOKOT S) 8.6-50 MG per tablet 2 Tab  2 Tab Oral BID Jonathan Shetty M.D.   Stopped at 08/02/19 0600    And   • polyethylene glycol/lytes (MIRALAX) PACKET 1 Packet  1 Packet Oral QDAY PRJARRELL Shetty M.D.   1 Packet at 08/01/19 1713    And   • magnesium hydroxide (MILK OF MAGNESIA) suspension 30 mL  30 mL Oral QDAY PRJARRELL Shetty M.D.        And   • bisacodyl (DULCOLAX) suppository 10 mg  10 mg Rectal QDAY PRJARRELL Shetty M.D.       • lidocaine (XYLOCAINE) 1 % injection 1 mL  1 mL Other DIALYSIS ZEINAB Larson M.D.   1 mL at 08/01/19 1915   • heparin injection 1,500 Units  1,500 Units Intravenous DIALYSIS PRJARRELL Larson M.D.   1,500 Units at 07/31/19 0755       Fluids    Intake/Output Summary (Last 24 hours) at 8/2/2019 0838  Last data filed at 8/2/2019 0600  Gross per 24 hour   Intake 988.87 ml   Output 1300 ml   Net -311.13 ml       Laboratory          Recent Labs     07/31/19 0241 08/01/19 0700 08/01/19  1651 08/02/19  0430   SODIUM 135  --  134*  --  135   POTASSIUM 6.4*   < > 5.8* 5.9* 5.4   CHLORIDE 92*  --  90*  --  94*   CO2 15*  --  16*  --  20   BUN 94*  --  90*  --  104*   CREATININE 9.16*  --  9.36*  --  9.46*   CALCIUM 9.4  --  9.5  --  9.1    < > = values in this interval not displayed.     Recent Labs     07/31/19 0241 08/01/19 0700 08/02/19 0430   ALTSGPT  --   --  497*   ASTSGOT  --   --   156*   ALKPHOSPHAT  --   --  148*   TBILIRUBIN  --   --  1.1   GLUCOSE 65 70 73     Recent Labs     08/01/19  0700 08/01/19  1540 08/02/19  0430   WBC 9.4  --  7.1   NEUTSPOLYS 90.30*  --   --    LYMPHOCYTES 2.80*  --   --    MONOCYTES 6.00  --   --    EOSINOPHILS 0.00 1  --    BASOPHILS 0.00  --   --    ASTSGOT  --   --  156*   ALTSGPT  --   --  497*   ALKPHOSPHAT  --   --  148*   TBILIRUBIN  --   --  1.1     Recent Labs     07/31/19  1308 08/01/19  0700 08/02/19  0430   RBC  --  2.86* 2.62*   HEMOGLOBIN  --  8.9* 8.0*   HEMATOCRIT  --  28.9* 26.1*   PLATELETCT  --  239 203   PROTHROMBTM 32.4* 26.6* 23.7*   INR 3.02* 2.35* 2.04*       Imaging  X-Ray:  I have personally reviewed the images and compared with prior images.  Echo:   Reviewed    Assessment/Plan  * Pericardial tamponade- (present on admission)  Assessment & Plan  Hemodynamically significant  S/P emergent pericardial drainage  Cardiology on board  Thoracic surgery has discussed pericardial window with cardiology and decision was made to delay window to observe if percutaneous drainage is adequate  Monitor for recurrence following drainage with serial echocardiograms  Check fluid glucose, c/s, GS, glucose, spec grav and cytology  Cell count most c/w Hemorrhagic effusion    Elevated transaminase level  Assessment & Plan  Likely hepatic congestion or ischemic hepatitis  Monitor  Consider further evaluation of failure to improve    Delirium  Assessment & Plan  Keep patient awake during the day and avoid daytime naps. Remove all unnecessary lines (central lines, peripheral IVs, feeding tubes, spicer catheters). Avoid polypharmacy, frequent re-orientation, maximize family time at bedside, use glasses and hearing aids if needed, treat pain, encourage ambulation, minimize benzos/anticholinergic agents.   Family states the patient's nocturnal regimen for sleep is both uneffective and makes him more confused. The patient received trazadone (home dose), ativan and  haldol last night. Will transition to xanax monotherapy after discussion with family, risks/benefits of benzo use reviewed including worsening of delirium.    Shock (HCC)  Assessment & Plan  Obstructive component secondary to pericardial tamponade relieved with pericardial drain  Continue Art line  Titrating vasopressors to maintain MAP >65 mmHg  ?  concommitment secondary cause of shock including vasodilatory.  Echo reviewed and IVC non-collapsible enlarged suggesting against hypovolemia.  Sepsis unlikely given lack of fever and leukocytosis as well as source  Start hydrocortisone for possible BRITTON, and midodrine    Paroxysmal atrial fibrillation (HCC)- (present on admission)  Assessment & Plan  Continue amiodarone  Continue metoprolol if hemodynamics improve  Reverse Coumadin coagulopathy for pending invasive procedures with 2 units FFP    Chronic anticoagulation- (present on admission)  Assessment & Plan  With coagulopathy due to Coumadin use  Has received Kcentra and vitamin K, 2 units FFP  Continue to hold, ?  Timing of resumption given likely hemorrhagic pericardial effusion    Aortic stenosis- (present on admission)  Assessment & Plan  Echo in 2018: AV Peak Velocity 3.1 m/s, Peak Gradient 38.3 mmHg, AV Mean Gradient 21.6   Echo 8/2/19: AV Peak Velocity 3.4 m/s, AV Peak Gradient 46.3 mmHg, AV Mean Gradient 26.4  Slightly worsened    Essential hypertension- (present on admission)  Assessment & Plan  Currently hypotensive  Hold all antihypertensive medications (doxazosin, Flomax, torsemide) for systolic blood pressure less than 100    ESRD (end stage renal disease) on dialysis ([facility])- (present on admission)  Assessment & Plan  Nephrology on board  HD for volume control and electrolyte abnormalities    Hyperkalemia, diminished renal excretion- (present on admission)  Assessment & Plan  HD following pericardiocentesis  Closely monitor    Anemia in CKD (chronic kidney disease)- (present on admission)  Assessment &  Plan  Monitor for need to transfuse, transfusion threshold: Hemoglobin <7    Dyslipidemia- (present on admission)  Assessment & Plan  Continue pravastatin    COPD (chronic obstructive pulmonary disease) (HCC)- (present on admission)  Assessment & Plan  Not currently in exacerbation  Continue home Anoro Ellipta, Flovent and Xopenex  RT/O2 Protocols  Titrate supplemental FiO2 to maintain SpO2 >88%    BPH (benign prostatic hypertrophy)- (present on admission)  Assessment & Plan  Hold Flomax due to current hypotension       VTE:  Contraindicated  Ulcer: Not Indicated  Lines: Arterial Line  Ongoing indication addressed    I have performed a physical exam and reviewed and updated ROS and Plan today (8/2/2019). In review of yesterday's note (8/1/2019), there are no changes except as documented above.     Titrating vasopressors, addressing delirium and shock. This patient is critically ill, at high risk for decompensation leading to worsening vital organ dysfunction and death without critical care interventions.    Discussed patient condition and risk of morbidity and/or mortality with Hospitalist, Family, RN, RT, Pharmacy, Dietary, , Charge nurse / hot rounds, Patient and general surgery     The patient remains critically ill.  Critical care time = 50 minutes in directly providing and coordinating critical care and extensive data review.  No time overlap and excludes procedures.

## 2019-08-02 NOTE — PROGRESS NOTES
"Chart reviewed for AMI and HF quality measures. Neither diagnosis applicable at this time, patient is being followed by cardiology for pericardial effusion with tamponade physiology.     Please place a \"Consult Nurse Navigator\" order (can be found in the HF order set) should AMI or HF become an active diagnosis.    Aurora KUMAR RN, [facility] ext. [phone] M-F        "

## 2019-08-02 NOTE — PROGRESS NOTES
Unable to get blood pressure. Patient educated again on importance of accurate blood pressure but refused arterial line. Dr. Gonda updated. Patient alert and oriented x4, 1+ bilateral peripheral upper and lower pulses, 02 sat 99%, HR 82. Will continue to monitor.

## 2019-08-02 NOTE — PROGRESS NOTES
"Dr. Gonda at bedside discussing the option of an arterial line. Patient states he \"wants to think about it.\" Education provided. Will update Dr. Gonda if patient makes decision.  "

## 2019-08-02 NOTE — PROGRESS NOTES
Hospital Medicine Daily Progress Note    Date of Service  8/2/2019    Chief Complaint  79 y.o. male admitted 7/28/2019 with weakness and SOB    Hospital Course    Hx ESRD on HD, Systolic CHF, AFib/Flttr, COPD, HLD, HTN, PVD, CAD, AC on coumadin, distant Hx of pericardial effusion.  Presented with above and in ED found to be hyperkalemic, in volume overload and had emergent HD.  CT done on 7/30 for abd pain showing large pericardial effusion.  Came to the ICU on 8/1 with hypotension.  Echo done showing tamponade physiology and went to cath lab for pericardial drain.  1300ml out on placement.  Prior to the procedure he was given KCentra, FFP and Vit K for INR of 3.4.    Post procedure remained hypotensive and on pressors  4      Interval Problem Update  Agitated overnight  AFlttr rate controled  Levo 8   AFebrile  NPO  Anuric      Consultants/Specialty  Cardiology  Thoracic Surgery  Pulmonology    Code Status  full    Disposition  Cont in ICU on pressors    Review of Systems  Review of Systems   Unable to perform ROS: Mental status change        Physical Exam  Temp:  [35.6 °C (96 °F)-36.2 °C (97.1 °F)] 35.9 °C (96.6 °F)  Pulse:  [] 81  Resp:  [8-28] 22  BP: ()/() 34/23  SpO2:  [89 %-100 %] 94 %    Physical Exam   Constitutional: He appears well-developed and well-nourished. No distress.   HENT:   Head: Normocephalic and atraumatic.   Nose: Nose normal.   Mouth/Throat: Oropharynx is clear and moist.   Eyes: Conjunctivae are normal.   Neck: No JVD present.   Cardiovascular: Normal rate. Exam reveals no gallop.   Murmur heard.  Pulmonary/Chest: Effort normal. No stridor. No respiratory distress. He has no wheezes. He has no rales.   Abdominal: Soft. There is no tenderness. There is no rebound and no guarding.   Musculoskeletal: He exhibits no edema.   Neurological: He is alert.   O x 2 to person and place   Skin: Skin is warm and dry. No rash noted. He is not diaphoretic.   Nursing note and vitals  reviewed.      Fluids    Intake/Output Summary (Last 24 hours) at 8/2/2019 0545  Last data filed at 8/2/2019 0400  Gross per 24 hour   Intake 961.66 ml   Output 1300 ml   Net -338.34 ml       Laboratory  Recent Labs     08/01/19  0700 08/02/19  0430   WBC 9.4 7.1   RBC 2.86* 2.62*   HEMOGLOBIN 8.9* 8.0*   HEMATOCRIT 28.9* 26.1*   .0* 99.6*   MCH 31.1 30.5   MCHC 30.8* 30.7*   RDW 56.4* 54.9*   PLATELETCT 239 203   MPV 10.7 10.6     Recent Labs     07/31/19  0241  08/01/19  0700 08/01/19  1651 08/02/19  0430   SODIUM 135  --  134*  --  135   POTASSIUM 6.4*   < > 5.8* 5.9* 5.4   CHLORIDE 92*  --  90*  --  94*   CO2 15*  --  16*  --  20   GLUCOSE 65  --  70  --  73   BUN 94*  --  90*  --  104*   CREATININE 9.16*  --  9.36*  --  9.46*   CALCIUM 9.4  --  9.5  --  9.1    < > = values in this interval not displayed.     Recent Labs     07/31/19  1308 08/01/19  0700 08/02/19  0430   INR 3.02* 2.35* 2.04*               Imaging  EC-ECHOCARDIOGRAM COMPLETE W/O CONT   Final Result      EC-ECHOCARDIOGRAM LTD W/O CONT   Final Result      DX-CHEST-LIMITED (1 VIEW)   Final Result      1.  No evidence of pneumothorax following RIGHT neck catheter placement   2.  Persistently enlarged cardiac silhouette   3.  Atherosclerosis      EC-ECHOCARDIOGRAM LTD W/O CONT   Final Result      CT-ABDOMEN-PELVIS W/O   Final Result      No CT evidence of acute inflammatory process in the abdomen or pelvis      Very large pericardial effusion has increased from 2016      Small right pleural effusion has enlarged      Aortic valvular calcifications indicating aortic stenosis. There is also severe coronary disease      Atrophic kidneys with cysts. Hepatic cysts are also seen.      Moderate distal colonic diverticulosis without evidence of diverticulitis      US-HEMODIALYSIS GRAFT DUPLEX COMP UPPER EXTREMITY   Final Result      [id number] VIEW   Final Result      1.  No evidence of bowel obstruction.   2.  Abdominal aortic and mesenteric  vascular calcifications.      DX-CHEST-PORTABLE (1 VIEW)   Final Result      1.  Stable cardiomegaly.   2.  No acute abnormality.      CL-PERICARDIOCENTESIS    (Results Pending)   US-HEMODIALYSIS GRAFT DUPLEX COMP UPPER EXTREMITY    (Results Pending)        Assessment/Plan  * Pericardial tamponade- (present on admission)  Assessment & Plan  S/p drain of 1300ml bloody fluid  ?coumadin vs uremic  May yet need window as this is his second event however not medically stable at this time for surgery    Shock (HCC)  Assessment & Plan  Pump vs BRITTON vs less likely sepsis or volume  Suspect due to BRITTON: start Hydrocortisone  Repeat Echo showing relatively good function    Paroxysmal atrial fibrillation (HCC)- (present on admission)  Assessment & Plan  With  A flutter   AC has been reversed; hold further AC as drain still in place, possible further procedures and bloody effusion  Continue amiodarone      Chronic anticoagulation- (present on admission)  Assessment & Plan  Reversed and on hold    Hypotension  Assessment & Plan  Initially cardiogenic shock due to tamponade however repeat Echo today after drain shows that physiology has resolved however cont's to be hypotensive.  ?Relative Adrenal Insuficiency  Start Hydrocortisone  Cont to titrate Levophed to MAP goal>65    Essential hypertension- (present on admission)  Assessment & Plan  Unstable BP with hypotensive episodes , likely due to Pericardial tamponade.   Not an acute issue  Hold antihypertensives.     ESRD (end stage renal disease) on dialysis (HCC)- (present on admission)  Assessment & Plan  Post emergent hemodialysis 7/29/2019 and daily HD with persistent hyperkal 6.4  --> 5.3  --> 5.8  Fistulas may not be effective . Discussed with Dr. Guerra- Nephrology - will discuss with Vascular - Dr. Peters.   Low k, renal diet  Lisinopril stopped.   Hold diuretics for now due to low BP      Hyperkalemia, diminished renal excretion- (present on admission)  Assessment &  Plan  Post emergent hemodialysis per  Dr. Larson with persistent hyperkalemia despite daily HD.  Possible poorly functional  fistula    Monitor tele   Ace inh stopped  Low k diet.   Discussed with Dr. Guerra.     Anemia in CKD (chronic kidney disease)- (present on admission)  Assessment & Plan  No symptoms of bleeding.   Monitor Hgb  Transfuse if needed for hemoglobin less than 7 gm/dL      Dyslipidemia- (present on admission)  Assessment & Plan  On statin therapy.  Monitor.     COPD (chronic obstructive pulmonary disease) (HCC)- (present on admission)  Assessment & Plan  Without current exacerbation.   Respiratory therapy as needed.   Continue scheduled Flovent, As needed Xopenex with scheduled Ellipta  Rt protocols      BPH (benign prostatic hypertrophy)- (present on admission)  Assessment & Plan  Continue with Flomax.  Monitor for urinary retention symptoms.       VTE prophylaxis: none as pericardial fluid bloody

## 2019-08-02 NOTE — CARE PLAN
Problem: Safety  Goal: Will remain free from injury  8/2/2019 0547 by Lila Gonzalez R.N.  Outcome: PROGRESSING AS EXPECTED  8/2/2019 0547 by Lila Gonzalez R.N.  Outcome: PROGRESSING AS EXPECTED  Bed locked and in low position, Lower bed rails raised, bed alarm in use, call light within reach, treaded slipper socks on patient and patient room near nursing station. Pt educated on calling when in need of assistance.      Problem: Communication  Goal: The ability to communicate needs accurately and effectively will improve  Outcome: PROGRESSING AS EXPECTED

## 2019-08-03 ENCOUNTER — APPOINTMENT (OUTPATIENT)
Dept: RADIOLOGY | Facility: MEDICAL CENTER | Age: 79
DRG: 628 | End: 2019-08-03
Attending: SURGERY
Payer: MEDICARE

## 2019-08-03 PROBLEM — R41.0 DELIRIUM: Status: ACTIVE | Noted: 2019-08-03

## 2019-08-03 PROBLEM — R74.01 ELEVATED TRANSAMINASE LEVEL: Status: ACTIVE | Noted: 2019-08-03

## 2019-08-03 LAB
ALBUMIN SERPL BCP-MCNC: 2.9 G/DL (ref 3.2–4.9)
ALBUMIN/GLOB SERPL: 1 G/DL
ALP SERPL-CCNC: 137 U/L (ref 30–99)
ALT SERPL-CCNC: 340 U/L (ref 2–50)
ANION GAP SERPL CALC-SCNC: 16 MMOL/L (ref 0–11.9)
AST SERPL-CCNC: 67 U/L (ref 12–45)
BASOPHILS # BLD AUTO: 0 % (ref 0–1.8)
BASOPHILS # BLD: 0 K/UL (ref 0–0.12)
BILIRUB SERPL-MCNC: 1 MG/DL (ref 0.1–1.5)
BODY FLD TYPE: NORMAL
BUN SERPL-MCNC: 82 MG/DL (ref 8–22)
CALCIUM SERPL-MCNC: 8.8 MG/DL (ref 8.5–10.5)
CHLORIDE SERPL-SCNC: 96 MMOL/L (ref 96–112)
CO2 SERPL-SCNC: 23 MMOL/L (ref 20–33)
CREAT SERPL-MCNC: 8.13 MG/DL (ref 0.5–1.4)
EKG IMPRESSION: NORMAL
EOSINOPHIL # BLD AUTO: 0 K/UL (ref 0–0.51)
EOSINOPHIL NFR BLD: 0 % (ref 0–6.9)
ERYTHROCYTE [DISTWIDTH] IN BLOOD BY AUTOMATED COUNT: 53.1 FL (ref 35.9–50)
GLOBULIN SER CALC-MCNC: 3 G/DL (ref 1.9–3.5)
GLUCOSE FLD-MCNC: 17 MG/DL
GLUCOSE SERPL-MCNC: 113 MG/DL (ref 65–99)
HCT VFR BLD AUTO: 25.2 % (ref 42–52)
HGB BLD-MCNC: 8.1 G/DL (ref 14–18)
IMM GRANULOCYTES # BLD AUTO: 0.04 K/UL (ref 0–0.11)
IMM GRANULOCYTES NFR BLD AUTO: 0.6 % (ref 0–0.9)
INR PPP: 1.72 (ref 0.87–1.13)
LYMPHOCYTES # BLD AUTO: 0.14 K/UL (ref 1–4.8)
LYMPHOCYTES NFR BLD: 2 % (ref 22–41)
MCH RBC QN AUTO: 30.9 PG (ref 27–33)
MCHC RBC AUTO-ENTMCNC: 32.1 G/DL (ref 33.7–35.3)
MCV RBC AUTO: 96.2 FL (ref 81.4–97.8)
MONOCYTES # BLD AUTO: 0.25 K/UL (ref 0–0.85)
MONOCYTES NFR BLD AUTO: 3.6 % (ref 0–13.4)
NEUTROPHILS # BLD AUTO: 6.43 K/UL (ref 1.82–7.42)
NEUTROPHILS NFR BLD: 93.8 % (ref 44–72)
NRBC # BLD AUTO: 0.03 K/UL
NRBC BLD-RTO: 0.4 /100 WBC
PLATELET # BLD AUTO: 193 K/UL (ref 164–446)
PMV BLD AUTO: 10.6 FL (ref 9–12.9)
POTASSIUM SERPL-SCNC: 5.3 MMOL/L (ref 3.6–5.5)
PROT FLD-MCNC: 5 G/DL
PROT SERPL-MCNC: 5.9 G/DL (ref 6–8.2)
PROTHROMBIN TIME: 20.7 SEC (ref 12–14.6)
RBC # BLD AUTO: 2.62 M/UL (ref 4.7–6.1)
SODIUM SERPL-SCNC: 135 MMOL/L (ref 135–145)
WBC # BLD AUTO: 6.9 K/UL (ref 4.8–10.8)

## 2019-08-03 PROCEDURE — 700102 HCHG RX REV CODE 250 W/ 637 OVERRIDE(OP): Performed by: HOSPITALIST

## 2019-08-03 PROCEDURE — A9270 NON-COVERED ITEM OR SERVICE: HCPCS | Performed by: INTERNAL MEDICINE

## 2019-08-03 PROCEDURE — 85025 COMPLETE CBC W/AUTO DIFF WBC: CPT

## 2019-08-03 PROCEDURE — 36556 INSERT NON-TUNNEL CV CATH: CPT

## 2019-08-03 PROCEDURE — B54CZZA ULTRASONOGRAPHY OF LEFT LOWER EXTREMITY VEINS, GUIDANCE: ICD-10-PCS | Performed by: SURGERY

## 2019-08-03 PROCEDURE — 06HN33Z INSERTION OF INFUSION DEVICE INTO LEFT FEMORAL VEIN, PERCUTANEOUS APPROACH: ICD-10-PCS | Performed by: SURGERY

## 2019-08-03 PROCEDURE — 80053 COMPREHEN METABOLIC PANEL: CPT

## 2019-08-03 PROCEDURE — 99232 SBSQ HOSP IP/OBS MODERATE 35: CPT | Performed by: INTERNAL MEDICINE

## 2019-08-03 PROCEDURE — 93010 ELECTROCARDIOGRAM REPORT: CPT | Performed by: INTERNAL MEDICINE

## 2019-08-03 PROCEDURE — 700102 HCHG RX REV CODE 250 W/ 637 OVERRIDE(OP): Performed by: INTERNAL MEDICINE

## 2019-08-03 PROCEDURE — 90935 HEMODIALYSIS ONE EVALUATION: CPT

## 2019-08-03 PROCEDURE — 700111 HCHG RX REV CODE 636 W/ 250 OVERRIDE (IP): Performed by: INTERNAL MEDICINE

## 2019-08-03 PROCEDURE — 700111 HCHG RX REV CODE 636 W/ 250 OVERRIDE (IP): Performed by: HOSPITALIST

## 2019-08-03 PROCEDURE — 99233 SBSQ HOSP IP/OBS HIGH 50: CPT | Performed by: INTERNAL MEDICINE

## 2019-08-03 PROCEDURE — 770022 HCHG ROOM/CARE - ICU (200)

## 2019-08-03 PROCEDURE — A9270 NON-COVERED ITEM OR SERVICE: HCPCS | Performed by: HOSPITALIST

## 2019-08-03 PROCEDURE — 85610 PROTHROMBIN TIME: CPT

## 2019-08-03 PROCEDURE — 93005 ELECTROCARDIOGRAM TRACING: CPT | Performed by: INTERNAL MEDICINE

## 2019-08-03 PROCEDURE — 99291 CRITICAL CARE FIRST HOUR: CPT | Performed by: INTERNAL MEDICINE

## 2019-08-03 PROCEDURE — 0JHM3XZ INSERTION OF TUNNELED VASCULAR ACCESS DEVICE INTO LEFT UPPER LEG SUBCUTANEOUS TISSUE AND FASCIA, PERCUTANEOUS APPROACH: ICD-10-PCS | Performed by: SURGERY

## 2019-08-03 PROCEDURE — 99233 SBSQ HOSP IP/OBS HIGH 50: CPT | Performed by: HOSPITALIST

## 2019-08-03 RX ORDER — SODIUM CHLORIDE 9 MG/ML
INJECTION, SOLUTION INTRAVENOUS
Status: ACTIVE
Start: 2019-08-03 | End: 2019-08-03

## 2019-08-03 RX ADMIN — DIPHENHYDRAMINE HYDROCHLORIDE 25 MG: 50 INJECTION INTRAMUSCULAR; INTRAVENOUS at 00:01

## 2019-08-03 RX ADMIN — PRAVASTATIN SODIUM 20 MG: 20 TABLET ORAL at 20:45

## 2019-08-03 RX ADMIN — MIDODRINE HYDROCHLORIDE 15 MG: 5 TABLET ORAL at 11:35

## 2019-08-03 RX ADMIN — Medication 2001 MG: at 17:43

## 2019-08-03 RX ADMIN — HYDROCORTISONE SODIUM SUCCINATE 50 MG: 100 INJECTION, POWDER, FOR SOLUTION INTRAMUSCULAR; INTRAVENOUS at 00:00

## 2019-08-03 RX ADMIN — HYDROCORTISONE SODIUM SUCCINATE 50 MG: 100 INJECTION, POWDER, FOR SOLUTION INTRAMUSCULAR; INTRAVENOUS at 06:16

## 2019-08-03 RX ADMIN — HYDROCORTISONE SODIUM SUCCINATE 50 MG: 100 INJECTION, POWDER, FOR SOLUTION INTRAMUSCULAR; INTRAVENOUS at 17:43

## 2019-08-03 RX ADMIN — Medication 2001 MG: at 11:35

## 2019-08-03 RX ADMIN — HYDROCORTISONE SODIUM SUCCINATE 50 MG: 100 INJECTION, POWDER, FOR SOLUTION INTRAMUSCULAR; INTRAVENOUS at 11:35

## 2019-08-03 RX ADMIN — MIDODRINE HYDROCHLORIDE 15 MG: 5 TABLET ORAL at 17:43

## 2019-08-03 RX ADMIN — MIDODRINE HYDROCHLORIDE 15 MG: 5 TABLET ORAL at 08:37

## 2019-08-03 RX ADMIN — Medication 2001 MG: at 08:37

## 2019-08-03 ASSESSMENT — ENCOUNTER SYMPTOMS
PALPITATIONS: 0
VOMITING: 0
SHORTNESS OF BREATH: 0
ABDOMINAL PAIN: 1
DIARRHEA: 0
BLURRED VISION: 0
LOSS OF CONSCIOUSNESS: 0
DOUBLE VISION: 0
COUGH: 0
FEVER: 0
BACK PAIN: 0
NAUSEA: 0
CHILLS: 0
HEADACHES: 0
SORE THROAT: 0
DIZZINESS: 0

## 2019-08-03 NOTE — CARE PLAN
Problem: Psychosocial Needs:  Goal: Level of anxiety will decrease  Outcome: PROGRESSING AS EXPECTED       PRN xanax for anxiety but pt refusing. Family encouraged to stay at bedside. Decreased stimulation. Encouraged rest/sleep.     Problem: Safety  Goal: Will remain free from injury  Outcome: PROGRESSING AS EXPECTED  Note:   In/out of alertness/orientation.  3-point restraints in place secondary to pt pulling on lines and medical devices. Right femoral art line.  Pt educated repeatedly. Needs frequent reminders.

## 2019-08-03 NOTE — PROGRESS NOTES
"  [facility] Medicine Daily Progress Note    Date of Service  8/3/2019    Chief Complaint  79 y.o. male admitted 7/28/2019 with weakness and SOB    Hospital Course    Hx ESRD on HD, Systolic CHF, AFib/Flttr, COPD, HLD, HTN, PVD, CAD, AC on coumadin, distant Hx of pericardial effusion.  Presented with above and in ED found to be hyperkalemic, in volume overload and had emergent HD.  CT done on 7/30 for abd pain showing large pericardial effusion.  Came to the ICU on 8/1 with hypotension.  Echo done showing tamponade physiology and went to cath lab for pericardial drain.  1300ml out on placement.  Prior to the procedure he was given KCentra, FFP and Vit K for INR of 3.4.    Post procedure remained hypotensive and on pressors  4      Interval Problem [place]  Confused overnight but this am is oriented  Pt states he feels \"OK\".  cont's to have some pain in his belly but this is less today \"not that bad\"  Sinus 70-80s  Levo 3  AFebrile  NPO  Anuric  Pericardial drain pulled this am    Consultants/Specialty  Cardiology  Thoracic Surgery  Pulmonology    Code Status  full    Disposition  Cont in ICU on pressors    Review of Systems  Review of Systems   Constitutional: Negative for chills and fever.   HENT: Negative for nosebleeds and sore throat.    Eyes: Negative for blurred vision and double vision.   Respiratory: Negative for cough and shortness of breath.    Cardiovascular: Negative for chest pain, palpitations and leg swelling.   Gastrointestinal: Positive for abdominal pain. Negative for diarrhea, nausea and vomiting.   Genitourinary: Negative for dysuria and urgency.   Musculoskeletal: Negative for back pain.   Skin: Negative for rash.   Neurological: Negative for dizziness, loss of consciousness and headaches.        Physical Exam  Temp:  [35.9 °C (96.6 °F)-36.3 °C (97.3 °F)] 36.1 °C (97 °F)  Pulse:  [71-88] 83  Resp:  [9-26] 19  BP: (72-99)/(17-36) 95/36  SpO2:  [90 %-99 %] 99 %    Physical Exam   Constitutional: He " is oriented to person, place, and time. He appears well-developed and well-nourished. No distress.   HENT:   Head: Normocephalic and atraumatic.   Nose: Nose normal.   Mouth/Throat: Oropharynx is clear and moist.   Eyes: Conjunctivae are normal.   Neck: No JVD present.   Cardiovascular: Normal rate. Exam reveals no gallop.   Murmur heard.  Pulmonary/Chest: Effort normal. No stridor. No respiratory distress. He has no wheezes. He has no rales.   Abdominal: Soft. There is no tenderness. There is no rebound and no guarding.   Musculoskeletal: He exhibits no edema.   Neurological: He is alert and oriented to person, place, and time.   O x 2 to person and place   Skin: Skin is warm and dry. No rash noted. He is not diaphoretic.   Psychiatric: He has a normal mood and affect. Thought content normal.   Nursing note and vitals reviewed.      Fluids    Intake/Output Summary (Last 24 hours) at 8/3/2019 0556  Last data filed at 8/3/2019 0200  Gross per 24 hour   Intake 485.21 ml   Output 2030 ml   Net -1544.79 ml       Laboratory  Recent Labs     08/01/19  0700 08/02/19  0430 08/03/19  0518   WBC 9.4 7.1 6.9   RBC 2.86* 2.62* 2.62*   HEMOGLOBIN 8.9* 8.0* 8.1*   HEMATOCRIT 28.9* 26.1* 25.2*   .0* 99.6* 96.2   MCH 31.1 30.5 30.9   MCHC 30.8* 30.7* 32.1*   RDW 56.4* 54.9* 53.1*   PLATELETCT 239 203 193   MPV 10.7 10.6 10.6     Recent Labs     08/01/19  0700 08/01/19  1651 08/02/19  0430 08/03/19  0518   SODIUM 134*  --  135 135   POTASSIUM 5.8* 5.9* 5.4 5.3   CHLORIDE 90*  --  94* 96   CO2 16*  --  20 23   GLUCOSE 70  --  73 113*   BUN 90*  --  104* 82*   CREATININE 9.36*  --  9.46* 8.13*   CALCIUM 9.5  --  9.1 8.8     Recent Labs     08/01/19  0700 08/02/19  0430 08/03/19  0518   INR 2.35* 2.04* 1.72*               Imaging  US-HEMODIALYSIS GRAFT DUPLEX COMP UPPER EXTREMITY   Final Result      EC-ECHOCARDIOGRAM COMPLETE W/O CONT   Final Result      EC-ECHOCARDIOGRAM LTD W/O CONT   Final Result      DX-CHEST-LIMITED (1  VIEW)   Final Result      1.  No evidence of pneumothorax following RIGHT neck catheter placement   2.  Persistently enlarged cardiac silhouette   3.  Atherosclerosis      EC-ECHOCARDIOGRAM LTD W/O CONT   Final Result      CT-ABDOMEN-PELVIS W/O   Final Result      No CT evidence of acute inflammatory process in the abdomen or pelvis      Very large pericardial effusion has increased from 2016      Small right pleural effusion has enlarged      Aortic valvular calcifications indicating aortic stenosis. There is also severe coronary disease      Atrophic kidneys with cysts. Hepatic cysts are also seen.      Moderate distal colonic diverticulosis without evidence of diverticulitis      US-HEMODIALYSIS GRAFT DUPLEX COMP UPPER EXTREMITY   Final Result      [id number] VIEW   Final Result      1.  No evidence of bowel obstruction.   2.  Abdominal aortic and mesenteric vascular calcifications.      DX-CHEST-PORTABLE (1 VIEW)   Final Result      1.  Stable cardiomegaly.   2.  No acute abnormality.      CL-PERICARDIOCENTESIS    (Results Pending)        Assessment/Plan  * Pericardial tamponade- (present on admission)  Assessment & Plan  S/p drain of 1300ml bloody fluid  ?coumadin vs uremic  May yet need window however not medically stable at this time for surgery    Delirium  Assessment & Plan  Mental status much improved  Cont to re-orient  Avoid sedating medications  mobilize    Shock (HCC)  Assessment & Plan  Pump vs BRITTON vs less likely sepsis or volume  Suspect due to BRITTON:  Midodrine   Cont to titrate pressors (Levophed)  Repeat Echo showing relatively good function    Paroxysmal atrial fibrillation (HCC)- (present on admission)  Assessment & Plan  With  A flutter   AC has been reversed; hold further AC as drain still in place, possible further procedures and bloody effusion  Continue amiodarone  May not be a good candidate to resume AC given blood pericardial effusion      Chronic anticoagulation- (present on  admission)  Assessment & Plan  Reversed and on hold    Uremia- (present on admission)  Assessment & Plan  HD per Nephro    Aortic stenosis- (present on admission)  Assessment & Plan  May be a TAVR candidate at some point if we can resolve his current acute issues    Hypotension  Assessment & Plan  Initially cardiogenic shock due to tamponade however repeat Echo today after drain shows that physiology has resolved however cont's to be hypotensive.  ?Relative Adrenal Insuficiency  Start midodrine  Cont to titrate Levophed to MAP goal>65    Essential hypertension- (present on admission)  Assessment & Plan  Unstable BP with hypotensive episodes , likely due to Pericardial tamponade.   Not an acute issue  Hold antihypertensives.     ESRD (end stage renal disease) on dialysis ([facility])- (present on admission)  Assessment & Plan  Post emergent hemodialysis 7/29/2019 and daily HD with persistent hyperkal 6.4  --> 5.3  --> 5.8  Fistulas may not be effective . Discussed with Dr. Guerra- Nephrology   Low k, renal diet  Lisinopril stopped.   Hold diuretics for now due to low BP  dificult acces as partially thrombosed L AVF, R is maturing.    Now with Fem HD cath      Hyperkalemia, diminished renal excretion- (present on admission)  Assessment & Plan  Post emergent hemodialysis per  Dr. Larson with persistent hyperkalemia despite daily HD.  Possible poorly functional  fistula    Monitor tele   Ace inh stopped  Low k diet.   Discussed with Dr. Guerra.     Anemia in CKD (chronic kidney disease)- (present on admission)  Assessment & Plan  No symptoms of bleeding.   Monitor Hgb  Transfuse if needed for hemoglobin less than 7 gm/dL      Dyslipidemia- (present on admission)  Assessment & Plan  On statin therapy.  Monitor.     COPD (chronic obstructive pulmonary disease) ([facility])- (present on admission)  Assessment & Plan  Without current exacerbation.   Respiratory therapy as needed.   Continue scheduled Flovent, As needed Xopenex with  scheduled Ellipta  Rt protocols      BPH (benign prostatic hypertrophy)- (present on admission)  Assessment & Plan  Continue with Flomax.  Monitor for urinary retention symptoms.       VTE prophylaxis: none as pericardial fluid bloody

## 2019-08-03 NOTE — PROGRESS NOTES
Cardiology Follow Up Progress Note    Date of Service  8/3/2019    Attending Physician  Yusuf Douglas M.D.    Chief Complaint   Pericardial effusion, status post pericardiocentesis.     HPI  Ronny Castelan is a 79 y.o. male admitted 7/28/2019 with above.    Interim Events  Minimal drainage from pericardial effusion tube.    Review of Systems  Review of Systems   Unable to perform ROS: Mental status change       Vital signs in last 24 hours  Temp:  [36.1 °C (97 °F)-36.3 °C (97.3 °F)] 36.2 °C (97.1 °F)  Pulse:  [71-89] 86  Resp:  [9-26] 16  BP: (72-99)/(17-36) 95/36  SpO2:  [90 %-100 %] 98 %    Physical Exam  Physical Exam   Constitutional: He appears cachectic.   HENT:   Head: Normocephalic and atraumatic.   Eyes: Pupils are equal, round, and reactive to light. Conjunctivae are normal.   Neck: Normal range of motion. Neck supple.   Cardiovascular: Normal rate and regular rhythm.   Pulmonary/Chest: Effort normal and breath sounds normal.   Pericardial drain in place.   Abdominal: Soft. Bowel sounds are normal.   Musculoskeletal: Normal range of motion. He exhibits no edema.   Skin: Skin is warm and dry.   Psychiatric: He has a normal mood and affect.       Lab Review  Lab Results   Component Value Date/Time    WBC 6.9 08/03/2019 05:18 AM    RBC 2.62 (L) 08/03/2019 05:18 AM    HEMOGLOBIN 8.1 (L) 08/03/2019 05:18 AM    HEMATOCRIT 25.2 (L) 08/03/2019 05:18 AM    MCV 96.2 08/03/2019 05:18 AM    MCH 30.9 08/03/2019 05:18 AM    MCHC 32.1 (L) 08/03/2019 05:18 AM    MPV 10.6 08/03/2019 05:18 AM      Lab Results   Component Value Date/Time    SODIUM 135 08/03/2019 05:18 AM    POTASSIUM 5.3 08/03/2019 05:18 AM    CHLORIDE 96 08/03/2019 05:18 AM    CO2 23 08/03/2019 05:18 AM    GLUCOSE 113 (H) 08/03/2019 05:18 AM    BUN 82 (HH) 08/03/2019 05:18 AM    CREATININE 8.13 (HH) 08/03/2019 05:18 AM    CREATININE 2.1 (H) 05/06/2009 10:08 AM      Lab Results   Component Value Date/Time    ASTSGOT 67 (H) 08/03/2019 05:18 AM    ALTSGPT 340  (H) 08/03/2019 05:18 AM     Lab Results   Component Value Date/Time    CHOLSTRLTOT 90 (L) 06/22/2017 07:55 AM    LDL 26 06/22/2017 07:55 AM    HDL 57 06/22/2017 07:55 AM    TRIGLYCERIDE 37 06/22/2017 07:55 AM    TROPONINT 197 (H) 07/29/2019 11:39 AM       No results for input(s): NTPROBNP in the last 72 hours.  (Above labs reviewed.)     Cardiac Imaging and Procedures Review    CARDIAC STUDIES AND PROCEDURES:       CTA OF CHEST (07/30/19)  No CT evidence of acute inflammatory process in the abdomen or pelvis  Very large pericardial effusion has increased from 2016  Small right pleural effusion has enlarged  Aortic valvular calcifications indicating aortic stenosis. There is also severe coronary disease  Atrophic kidneys with cysts. Hepatic cysts are also seen.  Moderate distal colonic diverticulosis without evidence of diverticulitis.    ECHOCARDIOGRAM CONCLUSIONS (08/02/19)  Compared to the images of the study done 8/1/19 - there has been   resolution of pericardial effusion.  Left ventricular ejection fraction is visually estimated to be 55%.  Moderate to severe aortic stenosis.Vmax is 3.50  m/s.  Transvalvular gradients are - Peak: 49 mmHg, Mean:  30 mmHg.  Dimensionless index is 0.2.  Right ventricular systolic pressure is estimated to be 55 mmHg.   (study result reviewed)     ECHOCARDIOGRAM CONCLUSIONS (08/01/19)  Intraoperative study performed during pericardiocentesis. Significant   pericardial effusion seen.     ECHOCARDIOGRAM CONCLUSIONS (07/31/19)  Large pericardial effusion with evidence of hemodynamic compromise.   Significant inflow variation is noted across the mitral valve. No clear   RA/RV diastolic collapse. IVC is plethoric. Concern for possible tamponade.       ECHOCARDIOGRAM CONCLUSIONS (08/07/18)  Normal left ventricular chamber size.   Moderate concentric left ventricular hypertrophy (LVPW 1.3 cm).   Mildly reduced left ventricular systolic function.   Left ventricular ejection fraction is  visually estimated to be 55%.   Moderate pericardial effusion without evidence of hemodynamic compromise.  Moderate aortic stenosis.  Compared to the images of the prior study done 5/2017-    there has been no significant change in the degree of AS or pericardial effusion. LA size has increased.      EKG performed on (08/01/19) KG shows atrial flutter.  EKG performed on (07/30/19) EKG shows atrial flutter with 2:1 block and right bundle-branch block.     TRANSESOPHAGEAL ECHOCARDIOGRAM CONCLUSIONS by Harvinder Almonte (06/13/19)  No thrombus detected in the left atrial appendage or other atrial   structures.  Moderately reduced left ventricular systolic function.  Left ventricular ejection fraction is visually estimated to be 30%.  Global hypokinesis.  Diastolic function is difficult to assess with atrial fibrillation.  Reduced right ventricular systolic function.  Mild mitral regurgitation.  Moderate aortic stenosis.  Mild aortic insufficiency.  Moderate pericardial effusion WITHOUT evidence of hemodynamic compromise.  Compared to the images of the transthoracic echocardiogram dated   8/7/2018, the ejection fraction is further reduced previously low   normal.  It is difficult to compare the size of the pericardial   effusion due to technique but it was previously large.    Assessment/Plan    1.  Pericardial effusion-S/P pericardiocentesis (08/1/2019): He is clinically doing well with minimal output from his pericardial drain. We will move the drain. We will repeat an echocardiogram in one week..  2.  Atrial flutter off of chronic anticoagulation: He is clinically doing well on anticoagulation and the ventricular rate is controlled. He may not be a good candidate for anticoagulation for excessive bleed risk.  3.  Aortic stenosis:  Moderate.  4.  Dilated cardiomyopathy:  Stable.  5.  Hypertension: Blood pressure has been low and supported on levophed.  6.  Hyperlipidemia:  Continue statin therapy.  7.   Peripheral vascular disease:  Stable without claudication.  8.  Right bundle branch block  9.  Chronic obstructive pulmonary disease, on O2 therapy.  10.  End-stage renal disease, on dialysis.    Thank you for allowing me to participate in the care of this patient.  We will sign off and follow up in clinic.    Please contact me with any questions.    Sunny Crum M.D.   Cardiologist, [facility]  [phone]

## 2019-08-03 NOTE — PROGRESS NOTES
HD treatment today per routine order.Treatment tolerated well.Patient slept the entire time.Net UF removed 2 L.Report given to primary Rn.

## 2019-08-03 NOTE — PROGRESS NOTES
DR Bella notified of message from Lab that the specific gravity test for pericardial fluid cannot be done.

## 2019-08-03 NOTE — PROGRESS NOTES
0100 -- Bilateral wrist restraints removed. Son to remain at bedside. Pt now AOx4. Verbalized and demonstrated understanding to not touch lines and/or get out of bed.   0130 -- Pt is calmer after restraints off, but angry about having to keep his right leg in the restraint. After explaining the importance as a reminder for pt not to bend his leg, pt not as adamant to have it removed. Snacks and beverage given per request.   0330 -- Pt finally asleep. Will monitor.

## 2019-08-03 NOTE — PROGRESS NOTES
"Pt asleep until approx 2145. Asking to get up out of bed to go to the bathroom. Agreeable to use bedpan, but once placed pt did not go. Removing gown and pulling at lines. After changing sheets and repositioning in bed, pt started to get more restless. Alert to self only (unable to assess others as pt not cooperating). Ronn Cho at bedside to help calm and redirect the pt. Pt refused to take PRN xanax and scheduled NOC med after multiple attempts. Pt stating he would not do anything \"until these shackles are removed and I can get up out of this bed.\" Repeated education given on why he is unable to mobilize at this time with no evidence of learning. Repeatedly gave reassurance.     Ronn Cho very understanding of situation and safety concerns, requested an IV antianxiety medication be available in case pt gets too out of control since he is refusing PO xanax. Dr. Gonda made aware approx 2250. D/t previous elongated QTc on EKG, haldol is not appropriate and ativan made pt's confusion and restlessness worse previous night. Telephone order received to obtain new EKG to check QTc to reassess appropriateness of IV haldol. For now will use IV benadryl. RBV.     "

## 2019-08-03 NOTE — PROGRESS NOTES
Progress Note    CC: f/u for HD access    Interval History: 79 y.o. male who presented 7/28/2019 with SOB. Has a non-functional LUE AFV and sclerotic non-functional RUE AVF. HD has been unreliable and the pt continues to have hyperkalemia and uremia.    Lethargic today.    Review of Systems  Negative for chest pain or SOB  Negative for stroke/TIA    Medications  Prior to Admission Medications   Prescriptions Last Dose Informant Patient Reported? Taking?   Fluticasone-Umeclidin-Vilant (TRELEGY ELLIPTA) 100-62.5-25 MCG/INH AEROSOL POWDER, BREATH ACTIVATED  Patient's Home Pharmacy No No   Sig: Inhale 1 Puff by mouth every day. Rinse mouth after use   amiodarone (CORDARONE) 200 MG Tab   No No   Sig: Take 1 Tab by mouth every day.   amiodarone (PACERONE) 400 MG tablet   No No   Sig: Take 1 Tab by mouth every day.   calcium acetate (PHOS-LO) 667 MG Cap  Patient's Home Pharmacy Yes No   Sig: Take 2,001 mg by mouth 3 times a day, with meals. 2001mg three times a day with meals and 1334mg with snacks   doxazosin (CARDURA) 4 MG Tab  Patient's Home Pharmacy Yes No   Sig: Take 4 mg by mouth every evening.   levalbuterol (XOPENEX) 1.25 MG/3ML Nebu Soln  Patient's Home Pharmacy No No   Sig: 3 mL by Nebulization route every four hours as needed for Shortness of Breath.   lisinopril (PRINIVIL) 5 MG Tab   No No   Sig: Take 1 Tab by mouth 2 Times a Day.   metoprolol SR (TOPROL XL) 100 MG TABLET SR 24 HR   No No   Sig: Take 1.5 Tabs by mouth every day.   omeprazole (PRILOSEC) 40 MG delayed-release capsule  Patient's Home Pharmacy Yes No   Sig: Take 40 mg by mouth every morning.   pravastatin (PRAVACHOL) 20 MG Tab  Patient's Home Pharmacy Yes No   Sig: Take 20 mg by mouth every evening.   tamsulosin (FLOMAX) 0.4 MG capsule  Patient's Home Pharmacy Yes No   Sig: Take 0.8 mg by mouth every morning.   torsemide (DEMADEX) 10 MG tablet   No No   Sig: Take 3 Tabs by mouth every day.   traZODone (DESYREL) 150 MG Tab  Patient's Home  Pharmacy Yes No   Sig: Take 300 mg by mouth every evening.   warfarin (COUMADIN) 5 MG Tab   No No   Sig: Take 1 Tab by mouth every day.   zolpidem (AMBIEN) 10 MG Tab   No No   Sig: Take 1 Tab by mouth every bedtime for 90 days.      Facility-Administered Medications: None         Physical Exam  Vitals:    19 0845   Pulse: 88   Resp: 15   Temp:    SpO2: 100%       Pulse/Extremity Exam:    General appearance: NAD, conversing appropriately  Psych: Normal affect, mood, judgement  Neuro: CN II-XII grossly intact.   Neck: full range of motion  Lungs: No inspiratory stridor or wheezing  CV: RRR  Abdomen: Soft, NT/ND  Skin: No rashes      Labs reviewed today:  Recent Labs     19  05   WBC 9.4 7.1 6.9   RBC 2.86* 2.62* 2.62*   HEMOGLOBIN 8.9* 8.0* 8.1*   HEMATOCRIT 28.9* 26.1* 25.2*   .0* 99.6* 96.2   MCH 31.1 30.5 30.9   MCHC 30.8* 30.7* 32.1*   RDW 56.4* 54.9* 53.1*   PLATELETCT 239 203 193   MPV 10.7 10.6 10.6     Recent Labs     19  0719  1651 19  0518   SODIUM 134*  --  135 135   POTASSIUM 5.8* 5.9* 5.4 5.3   CHLORIDE 90*  --  94* 96   CO2 16*  --  20 23   GLUCOSE 70  --  73 113*   BUN 90*  --  104* 82*   CREATININE 9.36*  --  9.46* 8.13*   CALCIUM 9.5  --  9.1 8.8     Recent Labs     19  0719  0430 19  0518   ALTSGPT  --  497* 340*   ASTSGOT  --  156* 67*   ALKPHOSPHAT  --  148* 137*   TBILIRUBIN  --  1.1 1.0   GLUCOSE 70 73 113*     Recent Labs     19  0719  0430 19  0518   INR 2.35* 2.04* 1.72*             No results for input(s): TROPONINI in the last 72 hours.    Urinalysis:    No results found     Imaging & Data Review:  No new imaging today.    Assessment/Plan & Medical Decision-MakinM w/ ESRD on HD but has two non-functional fistulas and unreliable HD.    Needs temporizing access for now.  Plan for permanent access in the coming week.    Thank you for involving us in this  patient's care. Please call with questions.    Jin Spaulding MD  Vascular Surgeon  [facility]  Office: [phone]

## 2019-08-03 NOTE — PROGRESS NOTES
"Critical Care Progress Note    Date of admission  7/28/2019    Chief Complaint  79 y.o. male admitted 7/28/2019 with pericardial effusion with tamponade    Hospital Course    \"79 y.o. male with past medical history of systolic congestive heart failure, atrial flutter on anticoagulation with Coumadin, coronary disease, ACS, end-stage renal disease on HD, COPD, hypercholesterolemia, hypertension, PVD who presented 7/28/2019 with shortness of breath and lightheadedness.  He was admitted for volume overload and hyperkalemia and was emergently dialyzed.  A CT scan of his abdomen was obtained on 7/30/2019 which showed a very large pericardial effusion and was followed by an echocardiogram 7/31/19 which showed evidence of significant inflow variation concerning for possible tamponade.  His INR was noted to be elevated at 3.42 and he was given K Centra, vitamin K with improvement to 3.02 then 2.35.  Cardiology planning a pericardiocentesis following administration of 2 units of FFP.  This afternoon the patient's blood pressure became worse with systolic blood pressures in the 60s and 50s.  He was transferred to the ICU and cardiology was recontacted for emergent pericardiocentesis.  At this time the patient has minimally symptomatic being lightheaded and epigastric/retrosternal pain.  Noninvasive blood pressure measurements are difficult to obtain due to the patient's bilateral upper extremity AV fistulas (left currently being used for HD and right being matured), all NIBP measurements are being obtained on his left lower extremity.  Thoracic surgery has also been consulted for pericardial window following drainage and correction of coagulopathy.  He denies any recent infectious symptoms.\"      Interval Problem Update  Reviewed last 24 hour events:  T-max 97.3  -1.5 L over the last 24 hours, -6 L since admit  US HD access nonocclusive peripheral mural thrombus, nonocclusive thrombus in the proximal forearm cephalic vein as " well, flow reduced  K5.3    Pericardial fluid 8/1 NGTD    Levophed@5  Hydrocortisone 50 mg every 6 hours  Midodrine 15 mg 3 times daily    98% on RA  Last BM 8/1      Review of Systems  Review of Systems   Unable to perform ROS: Mental acuity        Vital Signs for last 24 hours   Temp:  [36.1 °C (97 °F)-36.3 °C (97.3 °F)] 36.2 °C (97.1 °F)  Pulse:  [71-89] 86  Resp:  [9-26] 16  BP: (72-99)/(17-36) 95/36  SpO2:  [90 %-100 %] 98 %    Hemodynamic parameters for last 24 hours       Respiratory Information for the last 24 hours       Physical Exam   Physical Exam   Constitutional: He is oriented to person, place, and time. He appears well-developed and well-nourished. He appears distressed.   HENT:   Head: Normocephalic and atraumatic.   Right Ear: External ear normal.   Left Ear: External ear normal.   Mouth/Throat: Oropharynx is clear and moist.   Eyes: Conjunctivae and EOM are normal. No scleral icterus.   Neck: Neck supple. No JVD present. No tracheal deviation present.   Cardiovascular: Normal rate, regular rhythm and intact distal pulses.   Murmur heard.  Pulmonary/Chest: Effort normal and breath sounds normal. No respiratory distress.   Pericardial drain in place   Abdominal: Soft. Bowel sounds are normal. He exhibits no distension.   Musculoskeletal: Normal range of motion. He exhibits no edema or tenderness.   Right forearm AV fistula with palpable thrill, left forearm AV fistula with no palpable thrill   Neurological: He is alert and oriented to person, place, and time. No cranial nerve deficit. He exhibits normal muscle tone. Coordination normal.   Confused, input   Skin: Skin is warm and dry.   Psychiatric: His affect is angry. He is agitated. Cognition and memory are impaired. He expresses inappropriate judgment.   Nursing note and vitals reviewed.      Medications  Current Facility-Administered Medications   Medication Dose Route Frequency Provider Last Rate Last Dose   • SODIUM CHLORIDE 0.9 % IV SOLN             • diphenhydrAMINE (BENADRYL) injection 25 mg  25 mg Intravenous HS PRN Jeremy M Gonda, M.D.   25 mg at 08/03/19 0001   • umeclidinium-vilanterol (ANORO ELLIPTA) inhaler 1 Puff  1 Puff Inhalation DAILY Deniz Warner Jr. D.O.        And   • fluticasone (FLOVENT HFA) 44 MCG/ACT inhaler 88 mcg  2 Puff Inhalation DAILY Deniz Warner Jr., D.O.       • hydrocortisone sodium succinate PF (SOLU-CORTEF) 100 MG injection 50 mg  50 mg Intravenous Q6HRS MEGHAN Olson.O.   50 mg at 08/03/19 0616   • midodrine (PROAMATINE) tablet 15 mg  15 mg Oral TID WITH MEALS Deniz Warner Jr., D.O.   15 mg at 08/02/19 1738   • ALPRAZolam (XANAX) tablet 0.5 mg  0.5 mg Oral HS PRN Deniz Warner Jr., D.O.       • glucose 4 g chewable tablet 16 g  16 g Oral Q15 MIN PRN Jey Sanches D.O.        And   • DEXTROSE 10% BOLUS 250 mL  250 mL Intravenous Q15 MIN PRN MEGHAN Olson.O.       • norepinephrine (LEVOPHED) 8 mg in  mL Infusion  0-30 mcg/min Intravenous Continuous Jeremy M Gonda, M.D. 9.4 mL/hr at 08/03/19 0610 5 mcg/min at 08/03/19 0610   • amiodarone (CORDARONE) tablet 200 mg  200 mg Oral DAILY Jonathan Shetty M.D.   Stopped at 08/02/19 0600   • calcium acetate (PHOS-LO) 667 MG tablet 2,001 mg  2,001 mg Oral TID WITH MEALS Jonathan Shetty M.D.   Stopped at 08/02/19 0730   • levalbuterol (XOPENEX) 1.25 MG/3ML nebulizer solution 1.25 mg  1.25 mg Nebulization Q4H PRN (RT) Jonathan Shetty M.D.       • metoprolol SR (TOPROL XL) tablet 150 mg  150 mg Oral DAILY Jonathan Shetty M.D.   Stopped at 08/02/19 0600   • omeprazole (PRILOSEC) capsule 40 mg  40 mg Oral QAM Jonathan Shetty M.D.   Stopped at 08/02/19 0600   • pravastatin (PRAVACHOL) tablet 20 mg  20 mg Oral Nightly Jonathan Shetty M.D.   20 mg at 08/01/19 2212   • acetaminophen (TYLENOL) tablet 650 mg  650 mg Oral Q6HRS PRN Jonathan Shetty M.D.   650 mg at 07/31/19 2207   • Pharmacy Consult Request ...Pain Management Review 1 Each  1 Each  Other PHARMACY TO DOSE Jonathan Shetty M.D.        And   • oxyCODONE immediate-release (ROXICODONE) tablet 2.5 mg  2.5 mg Oral Q3HRS PRJARRELL Shetty M.D.        And   • oxyCODONE immediate-release (ROXICODONE) tablet 5 mg  5 mg Oral Q3HRS PRN Jonathan Shetty M.D.   5 mg at 08/01/19 2327    And   • morphine (pf) 4 mg/ml injection 2 mg  2 mg Intravenous Q3HRS PRN Jonathan Shetty M.D.   2 mg at 07/29/19 2025   • ondansetron (ZOFRAN) syringe/vial injection 4 mg  4 mg Intravenous Q4HRS PRJARRELL Shetty M.D.       • ondansetron (ZOFRAN ODT) dispertab 4 mg  4 mg Oral Q4HRS PRJARRELL Shetty M.D.       • senna-docusate (PERICOLACE or SENOKOT S) 8.6-50 MG per tablet 2 Tab  2 Tab Oral BID Jonathan Shetty M.D.   Stopped at 08/02/19 0600    And   • polyethylene glycol/lytes (MIRALAX) PACKET 1 Packet  1 Packet Oral QDAY PRJARRELL Shetty M.D.   1 Packet at 08/01/19 1713    And   • magnesium hydroxide (MILK OF MAGNESIA) suspension 30 mL  30 mL Oral QDAY PRJARRELL Shetty M.D.        And   • bisacodyl (DULCOLAX) suppository 10 mg  10 mg Rectal QDAY PRN Jonathan Shetty M.D.       • lidocaine (XYLOCAINE) 1 % injection 1 mL  1 mL Other DIALYSIS PRJARRELL Larson M.D.   1 mL at 08/01/19 1915   • heparin injection 1,500 Units  1,500 Units Intravenous DIALYSIS ZEINAB Larson M.D.   1,500 Units at 07/31/19 0755       Fluids    Intake/Output Summary (Last 24 hours) at 8/3/2019 0721  Last data filed at 8/3/2019 0700  Gross per 24 hour   Intake 473.14 ml   Output 2037.5 ml   Net -1564.36 ml       Laboratory          Recent Labs     08/01/19  0700 08/01/19  1651 08/02/19  0430 08/03/19  0518   SODIUM 134*  --  135 135   POTASSIUM 5.8* 5.9* 5.4 5.3   CHLORIDE 90*  --  94* 96   CO2 16*  --  20 23   BUN 90*  --  104* 82*   CREATININE 9.36*  --  9.46* 8.13*   CALCIUM 9.5  --  9.1 8.8     Recent Labs     08/01/19  0700 08/02/19  0430 08/03/19  0518   ALTSGPT  --  497* 340*   ASTSGOT  --  156* 67*   ALKPHOSPHAT  --  148*  137*   TBILIRUBIN  --  1.1 1.0   GLUCOSE 70 73 113*     Recent Labs     08/01/19  0700 08/01/19  1540 08/02/19  0430 08/03/19  0518   WBC 9.4  --  7.1 6.9   NEUTSPOLYS 90.30*  --   --  93.80*   LYMPHOCYTES 2.80*  --   --  2.00*   MONOCYTES 6.00  --   --  3.60   EOSINOPHILS 0.00 1  --  0.00   BASOPHILS 0.00  --   --  0.00   ASTSGOT  --   --  156* 67*   ALTSGPT  --   --  497* 340*   ALKPHOSPHAT  --   --  148* 137*   TBILIRUBIN  --   --  1.1 1.0     Recent Labs     08/01/19  0700 08/02/19  0430 08/03/19  0518   RBC 2.86* 2.62* 2.62*   HEMOGLOBIN 8.9* 8.0* 8.1*   HEMATOCRIT 28.9* 26.1* 25.2*   PLATELETCT 239 203 193   PROTHROMBTM 26.6* 23.7* 20.7*   INR 2.35* 2.04* 1.72*       Imaging  Ultrasound:  Reviewed    Assessment/Plan  * Pericardial tamponade- (present on admission)  Assessment & Plan  Hemodynamically significant  S/P emergent pericardial drainage  Cardiology on board  Thoracic surgery has discussed pericardial window with cardiology and decision was made to delay window to observe if percutaneous drainage is adequate  Monitor for recurrence following drainage with serial echocardiograms    Elevated transaminase level  Assessment & Plan  Likely hepatic congestion or ischemic hepatitis  Trending back down    Delirium  Assessment & Plan  Keep patient awake during the day and avoid daytime naps. Remove all unnecessary lines (central lines, peripheral IVs, feeding tubes, spicer catheters). Avoid polypharmacy, frequent re-orientation, maximize family time at bedside, use glasses and hearing aids if needed, treat pain, encourage ambulation, minimize benzos/anticholinergic agents.   Family states the patient's nocturnal regimen for sleep is both uneffective and makes him more confused. The patient received trazadone (home dose), ativan and haldol last night. Will transition to xanax monotherapy after discussion with family, risks/benefits of benzo use reviewed including worsening of delirium.    Shock (HCC)  Assessment  & Plan  Obstructive component secondary to pericardial tamponade relieved with pericardial drain  Continue Art line  Continue midodrine and hydrocortisone  Continue vasopressor to maintain map greater than 65    Paroxysmal atrial fibrillation (HCC)- (present on admission)  Assessment & Plan  Continue amiodarone  Currently not on AC    Chronic anticoagulation- (present on admission)  Assessment & Plan  With coagulopathy due to Coumadin use  Has received Kcentra and vitamin K, 2 units FFP  Continue to hold, ?  Timing of resumption given likely hemorrhagic pericardial effusion    Aortic stenosis- (present on admission)  Assessment & Plan  Echo in 2018: AV Peak Velocity 3.1 m/s, Peak Gradient 38.3 mmHg, AV Mean Gradient 21.6   Echo 8/2/19: AV Peak Velocity 3.4 m/s, AV Peak Gradient 46.3 mmHg, AV Mean Gradient 26.4  Slightly worsened    Essential hypertension- (present on admission)  Assessment & Plan  Currently hypotensive    ESRD (end stage renal disease) on dialysis ([facility])- (present on admission)  Assessment & Plan  Ultrasound of graft showing reduced flow and nonocclusive thrombus  HD per nephrology    Hyperkalemia, diminished renal excretion- (present on admission)  Assessment & Plan  Continue to follow  Getting HD    Anemia in CKD (chronic kidney disease)- (present on admission)  Assessment & Plan  Monitor for need to transfuse, transfusion threshold: Hemoglobin <7    Dyslipidemia- (present on admission)  Assessment & Plan  Continue pravastatin    COPD (chronic obstructive pulmonary disease) ([facility])- (present on admission)  Assessment & Plan  Not currently in exacerbation  Continue home Anoro Ellipta, Flovent and Xopenex  RT/O2 protocols  Goal sats 88 to 92%    BPH (benign prostatic hypertrophy)- (present on admission)  Assessment & Plan  Hold Flomax due to current hypotension       VTE:  Contraindicated  Ulcer: PPI  Lines: Arterial Line  Ongoing indication addressed    I have performed a physical exam and reviewed  and updated ROS and Plan today (8/3/2019). In review of yesterday's note (8/2/2019), there are no changes except as documented above.       Discussed patient condition and risk of morbidity and/or mortality with Hospitalist, Family, RN, RT, Pharmacy, Patient and general surgery     The patient remains critically ill.  Critical care time = 35 minutes in directly providing and coordinating critical care and extensive data review.  No time overlap and excludes procedures.

## 2019-08-03 NOTE — CONSULTS
"        Date of Service  8/2/2019    Reason For Consult  Hemodialysis access    Requesting Physician  Amena Guerra MD    Consulting Physician  Sera Peters M.D.    Primary Care Physician  Martha Light M.D.    Chief Complaint  Stenotic right arm fistula with dysfunctional left arm fistula    History of Present Illness  79 y.o. male who presented 7/28/2019 with shortness of breath.  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  Now despite daily dialysis, his potassium, BUN and creatinine are impossible to control.  A right forearm fistula was created and underwent angioplasty, but is sclerotic and cannot be used for funtional dialysis.    Review of Systems  Review of Systems   Unable to perform ROS: Acuity of condition       Past Medical History  Past Medical History:   Diagnosis Date   • Anesthesia     \"needs more sedation\" (can sometimes hear MD during procedure)    • Anticoagulation monitoring, special range    • Aortic stenosis     mod AS- concern for low flow severe AS with rEFof 30%   • Arterial leg ulcer ([facility]) 11/8/2018   • Atrial flutter ([facility]) 6/12/2019   • Basal cell carcinoma of left cheek 3/26/2015   • BPH 7/14/2009   • Bronchitis 12/25/2018   • CAD (coronary artery disease) 2/20/2014   • CATARACT     sanjuanita surgery complete   • CHF (congestive heart failure), NYHA class II, chronic, systolic ([facility]) E F30 in setting of atrial flutter 6/13/2019   • Chronic respiratory failure with hypoxia ([facility]) 5/8/2016   • CKD (chronic kidney disease) stage 4, GFR 15-29 ml/min ([facility]) 1/15/2010    end stage renal disease   • COPD (chronic obstructive pulmonary disease) ([facility])     wears 2.5 L o2 sometimes when he sleeps   • Detached retina    • Dialysis     [place],w,f [place]   • EMPHYSEMA    • Gout    • Heart burn     occas   • Heart murmur     maria esther lee cardiologist   • High cholesterol    • Hypertension    • Indigestion     occas   • Kidney cyst    • Leg pain, " bilateral 8/10/2015   • On supplemental oxygen therapy    • Peripheral vascular disease ([facility]) 8/10/2015   • Pneumonia    • Primary insomnia 3/22/2018   • Proteinuria    • PVD (peripheral vascular disease) ([facility])    • Sleep apnea     O2 PER CANULA  AT NIGHT 2l/m       Surgical History  Past Surgical History:   Procedure Laterality Date   • JUSTIN  6/13/2019    Procedure: ECHOCARDIOGRAM, TRANSESOPHAGEAL;  Surgeon: Harvinder Hoang M.D.;  Location: [facility];  Service: Cardiac   • CARDIOVERSION  6/13/2019    Procedure: CARDIOVERSION;  Surgeon: Harvinder Hoang M.D.;  Location: [facility];  Service: Cardiac   • AV FISTULA CREATION Right 2/21/2019    Procedure: AV FISTULA CREATION - ARM;  Surgeon: David Cason M.D.;  Location: SURGERY [facility];  Service: General   • FEMORAL ENDARTERECTOMY Left 1/25/2019    Procedure: FEMORAL ENDARTERECTOMY;  Surgeon: David Cason M.D.;  Location: SURGERY [facility];  Service: General   • FEMORAL POPLITEAL BYPASS Left 1/25/2019    Procedure: LEFT FEMORAL POPLITEAL POLYTETRAFLUOROETHYLENE ePTFE(PROPATEN VASCULAR GRAFT) BYPASS;  Surgeon: David Cason M.D.;  Location: SURGERY [facility];  Service: General   • ANGIOGRAM Left 1/25/2019    Procedure: LEFT LEG ANGIOGRAM;  Surgeon: David Cason M.D.;  Location: SURGERY [facility];  Service: General   • ENDOSCOPY PROCEDURE  3/21/2018    Procedure: ENDOSCOPY PROCEDURE/UPPER;  Surgeon: Enrique Child D.O.;  Location: SURGERY [facility];  Service: Gastroenterology   • COLONOSCOPY - ENDO  8/15/2016    Procedure: COLONOSCOPY - ENDO;  Surgeon: Mane Whatley M.D.;  Location: ENDOSCOPY [facility];  Service:    • GASTROSCOPY WITH BIOPSY  8/13/2016    Procedure: GASTROSCOPY WITH BIOPSY;  Surgeon: Jorge Leavitt M.D.;  Location: ENDOSCOPY [facility];  Service:    • RECOVERY  12/23/2015    Procedure: VASCULAR CASE-CASON-LEFT ARM FISTULOGRAM WITH ANGIOPLASTY;   Surgeon: Recoveryonly Surgery;  Location: SURGERY PRE[place] [facility];  Service:    • RECOVERY  3/24/2015    Performed by Recoveryonly Surgery at SURGERY [facility] [facility]   • RECOVERY  7/29/2014    Performed by Ir-Recovery Surgery at SURGERY SAME DAY [facility] ORS   • RECOVERY  3/24/2014    Performed by Ir[facility]Recovery Surgery at SURGERY [facility]   • RECOVERY  12/17/2013    Performed by Ir-Recovery Surgery at SURGERY SAME DAY [facility] ORS   • RECOVERY  7/2/2013    Performed by Ir-Recovery Surgery at SURGERY SAME DAY [facility] ORS   • AV FISTULA THROMBOLYSIS  7/2/2013    Performed by David Cason M.D. at SURGERY [facility]   • RECOVERY  1/29/2013    Performed by Ir-Recovery Surgery at SURGERY SAME DAY [facility] ORS   • RECOVERY  7/23/2012    Performed by SURGERY, IR-RECOVERY at SURGERY SAME DAY [facility] ORS   • VITRECTOMY POSTERIOR  10/11/2011    Performed by NAHUM GE at SURGERY SAME DAY [facility] ORS   • RECOVERY  8/12/2011    Performed by SURGERY, IR-RECOVERY at SURGERY SAME DAY [facility] ORS   • VITRECTOMY POSTERIOR  1/18/2011    Performed by NAHUM GE at SURGERY SAME DAY [facility] ORS   • SCLERAL BUCKLING  1/18/2011    Performed by NAHUM GE at SURGERY SAME DAY [facility] ORS   • AV FISTULOGRAM  9/17/2010    Performed by DAVID CASON at SURGERY [facility]S   • ANGIOPLASTY BALLOON  9/17/2010    Performed by DAVID CASON at SURGERY [facility] ORS   • AV FISTULOGRAM  7/23/2010    Performed by DAVDI CASON at SURGERY [facility] ORS   • ANGIOPLASTY BALLOON  7/23/2010    Performed by DAVID CASON at SURGERY [facility] ORS   • AV FISTULA REVISION  2/19/2010    Performed by WILLIE HATCH at SURGERY [facility] ORS   • AV FISTULA CREATION  2/12/2010    Performed by DAVID CASON at SURGERY [facility]   • CATARACT PHACO WITH IOL  4/8/08    Performed by STACEY PHILLIPS at SURGERY SAME DAY [facility]   • OTHER ORTHOPEDIC SURGERY  1998     right toe for facititis        Family History  Family History   Problem Relation Age of Onset   • Stroke Mother    • Hypertension Mother    • Lung Disease Father         Emphysema, resp failure   • Genitourinary () Problems Maternal Aunt         hematuria   • Hypertension Brother         Social History  Social History     Socioeconomic History   • Marital status:      Spouse name: Not on file   • Number of children: Not on file   • Years of education: Not on file   • Highest education level: Not on file   Occupational History   • Not on file   Social Needs   • Financial resource strain: Not on file   • Food insecurity:     Worry: Not on file     Inability: Not on file   • Transportation needs:     Medical: Not on file     Non-medical: Not on file   Tobacco Use   • Smoking status: Former Smoker     Packs/day: 1.00     Years: 40.00     Pack years: 40.00     Types: Cigarettes     Last attempt to quit: 1/1/2009     Years since quitting: 10.5   • Smokeless tobacco: Never Used   • Tobacco comment: 1 pk a day for 35 yrs, QUIT JAN 1 2010   Substance and Sexual Activity   • Alcohol use: No     Alcohol/week: 0.0 oz   • Drug use: No   • Sexual activity: Never     Partners: Female     Comment: , two sons, 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   Lifestyle   • Physical activity:     Days per week: Not on file     Minutes per session: Not on file   • Stress: Not on file   Relationships   • Social connections:     Talks on phone: Not on file     Gets together: Not on file     Attends [employer] service: Not on file     Active member of club or organization: Not on file     Attends meetings of clubs or organizations: Not on file     Relationship status: Not on file   • Intimate partner violence:     Fear of current or ex partner: Not on file     Emotionally abused: Not on file     Physically abused: Not on file     Forced sexual activity: Not on file   Other Topics Concern   • Not on file   Social History Narrative   • Not on  file       Medications  Prior to Admission Medications   Prescriptions Last Dose Informant Patient Reported? Taking?   Fluticasone-Umeclidin-Vilant (TRELEGY ELLIPTA) 100-62.5-25 MCG/INH AEROSOL POWDER, BREATH ACTIVATED  Patient's Home Pharmacy No No   Sig: Inhale 1 Puff by mouth every day. Rinse mouth after use   amiodarone (CORDARONE) 200 MG Tab   No No   Sig: Take 1 Tab by mouth every day.   amiodarone (PACERONE) 400 MG tablet   No No   Sig: Take 1 Tab by mouth every day.   calcium acetate (PHOS-LO) 667 MG Cap  Patient's Home Pharmacy Yes No   Sig: Take 2,001 mg by mouth 3 times a day, with meals. 2001mg three times a day with meals and 1334mg with snacks   doxazosin (CARDURA) 4 MG Tab  Patient's Home Pharmacy Yes No   Sig: Take 4 mg by mouth every evening.   levalbuterol (XOPENEX) 1.25 MG/3ML Nebu Soln  Patient's Home Pharmacy No No   Sig: 3 mL by Nebulization route every four hours as needed for Shortness of Breath.   lisinopril (PRINIVIL) 5 MG Tab   No No   Sig: Take 1 Tab by mouth 2 Times a Day.   metoprolol SR (TOPROL XL) 100 MG TABLET SR 24 HR   No No   Sig: Take 1.5 Tabs by mouth every day.   omeprazole (PRILOSEC) 40 MG delayed-release capsule  Patient's Home Pharmacy Yes No   Sig: Take 40 mg by mouth every morning.   pravastatin (PRAVACHOL) 20 MG Tab  Patient's Home Pharmacy Yes No   Sig: Take 20 mg by mouth every evening.   tamsulosin (FLOMAX) 0.4 MG capsule  Patient's Home Pharmacy Yes No   Sig: Take 0.8 mg by mouth every morning.   torsemide (DEMADEX) 10 MG tablet   No No   Sig: Take 3 Tabs by mouth every day.   traZODone (DESYREL) 150 MG Tab  Patient's Home Pharmacy Yes No   Sig: Take 300 mg by mouth every evening.   warfarin (COUMADIN) 5 MG Tab   No No   Sig: Take 1 Tab by mouth every day.   zolpidem (AMBIEN) 10 MG Tab   No No   Sig: Take 1 Tab by mouth every bedtime for 90 days.      Facility-Administered Medications: None       Current Facility-Administered Medications   Medication Dose Route  Frequency Provider Last Rate Last Dose   • diphenhydrAMINE (BENADRYL) injection 25 mg  25 mg Intravenous HS PRN Jeremy M Gonda, M.D.   25 mg at 08/02/19 0114   • [START ON 8/3/2019] umeclidinium-vilanterol (ANORO ELLIPTA) inhaler 1 Puff  1 Puff Inhalation DAILY Deniz Warner Jr., D.O.        And   • [START ON 8/3/2019] fluticasone (FLOVENT HFA) 44 MCG/ACT inhaler 88 mcg  2 Puff Inhalation DAILY Deniz Warner Jr., D.O.       • hydrocortisone sodium succinate PF (SOLU-CORTEF) 100 MG injection 50 mg  50 mg Intravenous Q6HRS Jey Sanches D.OLise   50 mg at 08/02/19 1739   • midodrine (PROAMATINE) tablet 15 mg  15 mg Oral TID WITH MEALS Deniz Warner Jr., D.O.   15 mg at 08/02/19 1738   • ALPRAZolam (XANAX) tablet 0.5 mg  0.5 mg Oral HS PRN Deniz Warner Jr., D.O.       • glucose 4 g chewable tablet 16 g  16 g Oral Q15 MIN PRN Jey Sanches D.O.        And   • DEXTROSE 10% BOLUS 250 mL  250 mL Intravenous Q15 MIN PRN Jey Sanches D.O.       • norepinephrine (LEVOPHED) 8 mg in  mL Infusion  0-30 mcg/min Intravenous Continuous Jeremy M Gonda, M.D. 9.4 mL/hr at 08/02/19 2110 5 mcg/min at 08/02/19 2110   • amiodarone (CORDARONE) tablet 200 mg  200 mg Oral DAILY Jonathan Shetty M.D.   Stopped at 08/02/19 0600   • calcium acetate (PHOS-LO) 667 MG tablet 2,001 mg  2,001 mg Oral TID WITH MEALS Jonathan Shetty M.D.   Stopped at 08/02/19 0730   • levalbuterol (XOPENEX) 1.25 MG/3ML nebulizer solution 1.25 mg  1.25 mg Nebulization Q4H PRN (RT) Jonathan Shetty M.D.       • metoprolol SR (TOPROL XL) tablet 150 mg  150 mg Oral DAILY Jonathan Shetty M.D.   Stopped at 08/02/19 0600   • omeprazole (PRILOSEC) capsule 40 mg  40 mg Oral QAM Jonathan Shetty M.D.   Stopped at 08/02/19 0600   • pravastatin (PRAVACHOL) tablet 20 mg  20 mg Oral Nightly Jonathan Shetty M.D.   20 mg at 08/01/19 2212   • acetaminophen (TYLENOL) tablet 650 mg  650 mg Oral Q6HRS PRN Jonathan Shetty M.D.   650 mg at 07/31/19  2207   • Pharmacy Consult Request ...Pain Management Review 1 Each  1 Each Other PHARMACY TO DOSE Jonathan Shetty M.D.        And   • oxyCODONE immediate-release (ROXICODONE) tablet 2.5 mg  2.5 mg Oral Q3HRS PRN Jonathan Shetty M.D.        And   • oxyCODONE immediate-release (ROXICODONE) tablet 5 mg  5 mg Oral Q3HRS PRN Jonathan Shetty M.D.   5 mg at 08/01/19 2327    And   • morphine (pf) 4 mg/ml injection 2 mg  2 mg Intravenous Q3HRS PRN Jonathan Shetty M.D.   2 mg at 07/29/19 2025   • ondansetron (ZOFRAN) syringe/vial injection 4 mg  4 mg Intravenous Q4HRS PRN Jonathan Shetty M.D.       • ondansetron (ZOFRAN ODT) dispertab 4 mg  4 mg Oral Q4HRS PRN Jonathan Shetty M.D.       • senna-docusate (PERICOLACE or SENOKOT S) 8.6-50 MG per tablet 2 Tab  2 Tab Oral BID Jonathan Shetty M.D.   Stopped at 08/02/19 0600    And   • polyethylene glycol/lytes (MIRALAX) PACKET 1 Packet  1 Packet Oral QDAY PRN Jonathan Shetty M.D.   1 Packet at 08/01/19 1713    And   • magnesium hydroxide (MILK OF MAGNESIA) suspension 30 mL  30 mL Oral QDAY PRN Jonathan Shetty M.D.        And   • bisacodyl (DULCOLAX) suppository 10 mg  10 mg Rectal QDAY PRN Jonathan Shetty M.D.       • lidocaine (XYLOCAINE) 1 % injection 1 mL  1 mL Other DIALYSIS PRN Long Larson M.D.   1 mL at 08/01/19 1915   • heparin injection 1,500 Units  1,500 Units Intravenous DIALYSIS PRN Long Larson M.D.   1,500 Units at 07/31/19 0755       Allergies  No Known Allergies      Physical Exam  Temp:  [35.9 °C (96.6 °F)] 35.9 °C (96.6 °F)  Pulse:  [71-89] 75  Resp:  [9-26] 19  BP: ()/() 88/21  SpO2:  [89 %-100 %] 92 %    Pulse/Extremity Exam:  Right arm radial cephalic fistula is sclerotic with no functional lumen.  The cephalic and basilic veins appear to be large enough for fistula creation. The left arm fistula is dilated and pulsatile.    General appearance: ill appearing  Neuro: somnolent without full assessment   Neck: full range of motion  Lungs:   Poor effort, dull at the bases  CV: RRR  Abdomen: Soft, NT/ND  Skin: No rashes    Labs Reviewed Today:  Recent Labs     08/01/19  0700 08/02/19  0430   WBC 9.4 7.1   RBC 2.86* 2.62*   HEMOGLOBIN 8.9* 8.0*   HEMATOCRIT 28.9* 26.1*   .0* 99.6*   MCH 31.1 30.5   MCHC 30.8* 30.7*   RDW 56.4* 54.9*   PLATELETCT 239 203   MPV 10.7 10.6     Recent Labs     07/31/19  0241  08/01/19  0700 08/01/19  1651 08/02/19  0430   SODIUM 135  --  134*  --  135   POTASSIUM 6.4*   < > 5.8* 5.9* 5.4   CHLORIDE 92*  --  90*  --  94*   CO2 15*  --  16*  --  20   GLUCOSE 65  --  70  --  73   BUN 94*  --  90*  --  104*   CREATININE 9.16*  --  9.36*  --  9.46*   CALCIUM 9.4  --  9.5  --  9.1    < > = values in this interval not displayed.     Recent Labs     07/31/19  0241 08/01/19  0700 08/02/19  0430   ALTSGPT  --   --  497*   ASTSGOT  --   --  156*   ALKPHOSPHAT  --   --  148*   TBILIRUBIN  --   --  1.1   GLUCOSE 65 70 73     Recent Labs     07/31/19  1308 08/01/19  0700 08/02/19  0430   INR 3.02* 2.35* 2.04*         Assessment/Plan & Medical Decision-Making  The non-invasive study seems inaccurate, but physical exam suggests a right arm fistula that is too small to use for dialysis and one that is nearly occluded.  I think Ronny needs a new upper arm fistula and in the meantime, a catheter to get him stabilized with better dialysis.  We can plan a tunneled catheter and fistula creation later in the week.       Sera Peters MD  Vascular Surgeon  [facility]  Office: [phone]

## 2019-08-03 NOTE — PROGRESS NOTES
After decreasing stimulation, pt has settled down and appears to be falling back asleep. Will hold off on EKG for now. Restraints remain in place. Will monitor.

## 2019-08-03 NOTE — PROGRESS NOTES
IR Procedure RN's Note:    Non tunneled dialysis catheter placement done by Dr. Spaulding; LEFT femoral vein access site; pt assessed upon arrival, pt A/Ox2, pt aware of the procedure, pt baseline - pt is sleeping and very subdued upon arrival, spouse at bedside; [employer] Slim cath short term dialysis catheter 12F x 20cm REF#[id number]; pt appears comfortable throughout the procedure with no signs of distress or discomfort; NO SEDATION used for this procedure; access site CDI, soft with no signs of hematoma or bleeding, gauze and tegaderm for dressing; telephone report given to Adilia; pt is back to baseline; pt transported to room.

## 2019-08-03 NOTE — PROGRESS NOTES
Nephrology Daily Progress Note    Date of Service  8/3/2019    Chief Complaint  79 y.o. male with ESRD/HD,admitted 7/28/2019 with worsening SOB, hyperkalemia    Interval Problem Update  Hypotensive -transferred to ICU on Levophed  Received temporary catheter femoral catheter -as AVF non functioning well  Large pericardia effusion -s/p pericardiocenthesis -drain removed  Hyperkalemia -correcting with HD  Poorly functioning AVF -placed femoral catheter  Daily dialysis -completed dialysis this morning tolerated well  Review of Systems  Review of Systems   Unable to perform ROS: Medical condition    Sedated    Physical Exam  Temp:  [36.1 °C (97 °F)-36.3 °C (97.3 °F)] 36.2 °C (97.1 °F)  Pulse:  [71-92] 80  Resp:  [9-33] 17  BP: ()/(17-43) 112/43  SpO2:  [90 %-100 %] 96 %    Physical Exam   Constitutional: He appears well-developed and well-nourished. No distress.   HENT:   Head: Normocephalic and atraumatic.   Nose: Nose normal.   Mouth/Throat: Oropharynx is clear and moist.   Eyes: Pupils are equal, round, and reactive to light. Conjunctivae and EOM are normal.   Neck: No thyromegaly present.   Cardiovascular: Regular rhythm. Exam reveals no gallop and no friction rub.   Pulmonary/Chest: Effort normal and breath sounds normal. No respiratory distress. He has no wheezes. He has no rales.   Abdominal: Soft. Bowel sounds are normal. He exhibits no distension and no mass. There is no tenderness. There is no guarding.   Musculoskeletal: He exhibits no edema.   Neurological:   somnolent   Skin: Skin is warm. No rash noted. No erythema.   Nursing note and vitals reviewed.      Fluids    Intake/Output Summary (Last 24 hours) at 8/3/2019 1458  Last data filed at 8/3/2019 0800  Gross per 24 hour   Intake 461.31 ml   Output 2037.5 ml   Net -1576.19 ml       Laboratory  Recent Labs     08/01/19  0700 08/02/19  0430 08/03/19  0518   WBC 9.4 7.1 6.9   RBC 2.86* 2.62* 2.62*   HEMOGLOBIN 8.9* 8.0* 8.1*   HEMATOCRIT 28.9* 26.1*  25.2*   .0* 99.6* 96.2   MCH 31.1 30.5 30.9   MCHC 30.8* 30.7* 32.1*   RDW 56.4* 54.9* 53.1*   PLATELETCT 239 203 193   MPV 10.7 10.6 10.6     Recent Labs     08/01/19  0700 08/01/19  1651 08/02/19  0430 08/03/19  0518   SODIUM 134*  --  135 135   POTASSIUM 5.8* 5.9* 5.4 5.3   CHLORIDE 90*  --  94* 96   CO2 16*  --  20 23   GLUCOSE 70  --  73 113*   BUN 90*  --  104* 82*   CREATININE 9.36*  --  9.46* 8.13*   CALCIUM 9.5  --  9.1 8.8     Recent Labs     08/01/19  0700 08/02/19  0430 08/03/19  0518   INR 2.35* 2.04* 1.72*     No results for input(s): NTPROBNP in the last 72 hours.        Imaging  CT reviewed, ECHO reviewed, AVF US reviewed    Assessment/Plan  1.ESRD/HD -daily dialysis -please see dialysis flow sheet for details  2.Hypotensive - on Levophed  3.Electrolytes: hyperkalemia - better -correcting with HD  4.Anemia: Hb stable  5.AVF mulfunction -appreciate  and Dr. Spaulding help  Recs; daily BMP             Daily dialysis             Renal/low K diet!             Daily BMP

## 2019-08-03 NOTE — PROGRESS NOTES
Nephrology Daily Progress Note    Date of Service  8/2/2019    Chief Complaint  79 y.o. male with ESRD/HD,admitted 7/28/2019 with worsening SOB, hyperkalemia    Interval Problem Update  Hypotensive -transferred to ICU on Levophed  Large pericardia effusion -s/p pericardiocenthesis  Hyperkalemia -correcting with  Poorly functioning AVF  Daily dialysis  Seen and examined during dialysis -tolerates well  Review of Systems  Review of Systems   Constitutional: Negative for chills, fever, malaise/fatigue and weight loss.   HENT: Negative for congestion, hearing loss and sinus pain.    Eyes: Negative.    Respiratory: Negative for cough, hemoptysis and wheezing.    Cardiovascular: Positive for orthopnea and leg swelling. Negative for chest pain and palpitations.   Gastrointestinal: Negative for abdominal pain, nausea and vomiting.   Skin: Negative.    All other systems reviewed and are negative.       Physical Exam  Temp:  [35.9 °C (96.6 °F)] 35.9 °C (96.6 °F)  Pulse:  [65-89] 82  Resp:  [9-28] 13  BP: ()/() 34/23  SpO2:  [89 %-100 %] 94 %    Physical Exam   Constitutional: He is oriented to person, place, and time. He appears well-developed and well-nourished. No distress.   HENT:   Head: Normocephalic and atraumatic.   Nose: Nose normal.   Mouth/Throat: Oropharynx is clear and moist.   Eyes: Pupils are equal, round, and reactive to light. Conjunctivae and EOM are normal.   Neck: Normal range of motion. Neck supple. No thyromegaly present.   Cardiovascular: Normal rate and regular rhythm. Exam reveals no gallop and no friction rub.   Pericardial drain in place   Pulmonary/Chest: Effort normal and breath sounds normal. No respiratory distress. He has no wheezes. He has no rales.   Abdominal: Soft. Bowel sounds are normal. He exhibits no distension and no mass. There is no tenderness. There is no guarding.   Musculoskeletal: He exhibits edema.   Neurological: He is alert and oriented to person, place, and time.    Skin: Skin is warm. No rash noted. No erythema.   Nursing note and vitals reviewed.      Fluids    Intake/Output Summary (Last 24 hours) at 8/2/2019 1723  Last data filed at 8/2/2019 1200  Gross per 24 hour   Intake 708.87 ml   Output 3300 ml   Net -2591.13 ml       Laboratory  Recent Labs     08/01/19  0700 08/02/19  0430   WBC 9.4 7.1   RBC 2.86* 2.62*   HEMOGLOBIN 8.9* 8.0*   HEMATOCRIT 28.9* 26.1*   .0* 99.6*   MCH 31.1 30.5   MCHC 30.8* 30.7*   RDW 56.4* 54.9*   PLATELETCT 239 203   MPV 10.7 10.6     Recent Labs     07/31/19  0241  08/01/19  0700 08/01/19  1651 08/02/19  0430   SODIUM 135  --  134*  --  135   POTASSIUM 6.4*   < > 5.8* 5.9* 5.4   CHLORIDE 92*  --  90*  --  94*   CO2 15*  --  16*  --  20   GLUCOSE 65  --  70  --  73   BUN 94*  --  90*  --  104*   CREATININE 9.16*  --  9.36*  --  9.46*   CALCIUM 9.4  --  9.5  --  9.1    < > = values in this interval not displayed.     Recent Labs     07/31/19  1308 08/01/19  0700 08/02/19  0430   INR 3.02* 2.35* 2.04*     No results for input(s): NTPROBNP in the last 72 hours.        Imaging  CT reviewed, ECHO reviewed, AVF US reviewed    Assessment/Plan  1.ESRD/HD -please see dialysis flow sheet for details  2.Hypotensive - on Leviphed  3.Electrolytes: hyperkalemia -correcting with HD  4.Anemia: Hb stable  5.AVF -consulted  to evaluate  Recs; daily BMP             Daily dialysis             Renal/low K diet!

## 2019-08-03 NOTE — PROGRESS NOTES
Pt becoming increasingly agitated and restless. EKG obtained, QTc is more prolonged at 570, so haldol not an option. Explained this to son Marquis. Multiple attempts to redirect unsuccessful. Pt still demanding to be let out of his restraints and to get out of bed.

## 2019-08-03 NOTE — DOCUMENTATION QUERY
[facility]                                                                       Query Response Note      PATIENT:               EDVIN GREEN  ACCT #:                  [id number]  MRN:                     [id number]  :                      1940  ADMIT DATE:       2019 10:18 PM  DISCH DATE:          RESPONDING  PROVIDER #:        [id number]           QUERY TEXT:    Congestive Heart Failure is documented in the Medical Record. Please document the type and acuity (includes probable or suspected).     NOTE:  If an appropriate response is not listed below, please respond with a new note.    The patient's Clinical Indicators include:  ? History of systolic congestive heart failure per notes. LVEF 55%  ? Treatments: cardiology consult, chest x ray, echocardiogram   ? Risk factors: pericardial effusion, cardiac tamponade,, AFIB/A flutter, end stage renal disease, hyperkalemia, shock, aortic valve stenosis  Options provided:   -- Acute Systolic heart failure   -- Chronic Systolic heart failure   -- Acute on Chronic Systolic heart failure   -- Acute Diastolic heart failure   -- Chronic Diastolic heart failure   -- Acute on Chronic Diastolic heart failure   -- Acute Systolic and Diastolic heart failure   -- Chronic Systolic and Diastolic heart failure   -- Acute on Chronic Systolic and diastolic heart failure   -- Heart failure is ruled out   -- Unable to determine      Query created by: Mary Calix on 2019 2:39 PM    RESPONSE TEXT:    Chronic Diastolic heart failure          Electronically signed by:  ANNABELLA RUFF JR 8/3/2019 12:55 AM

## 2019-08-03 NOTE — PROCEDURES
PROCEDURE NOTE      Patient:  Parmjit Castelan     Procedure Date:  08/03/19    Indication:  ESRD in need of HD access    Pre-Operative Diagnosis:  ESRD    Post-Operative Diagnosis:  Same    Procedure:  US guided access of the left common femoral vein  Left femoral temporary HD catheter placement    Surgeon:  Jin Spaulding M.D.    Assistant:  None    Anesthesia:  Local    EBL:  5cc    Specimen:  None    Complications:  None    Findings:  A 20cm L femoral temp cath was placed without complication. All ports flushed easily.    Ok to use the catheter for HD immediately.    Procedure:  Informed consent was obtained from the patient's wife in the pre-operative holding area. All risks, benefits, and alternatives were explained and she elected to proceed on his behalf. The patient was brought to the IR suite and placed on the table in a supine position. A time-out was performed verifying the correct site of surgery and the patient was prepped and draped sterily.    The skin in the left groin wall was anesthetized with 1% marcaine in preparation for tunneling. The left common femoral vein was accessed using ultrasound guidance and a J-wire was advanced without resistance.    The vein was serially dilated and then a 20cm temporary HD catheter was advanced over the wire. All ports flushed easily. The catheter was secured with nylon suture. A biopatch and occlusive dressings were applied. The patient was recovered from anesthesia and taken back to his room in stable condition.    Jin Spaulding MD  Vascular Surgeon  [facility]

## 2019-08-04 LAB
ANION GAP SERPL CALC-SCNC: 14 MMOL/L (ref 0–11.9)
BUN SERPL-MCNC: 77 MG/DL (ref 8–22)
CALCIUM SERPL-MCNC: 8.9 MG/DL (ref 8.5–10.5)
CHLORIDE SERPL-SCNC: 98 MMOL/L (ref 96–112)
CO2 SERPL-SCNC: 26 MMOL/L (ref 20–33)
CREAT SERPL-MCNC: 7.04 MG/DL (ref 0.5–1.4)
ERYTHROCYTE [DISTWIDTH] IN BLOOD BY AUTOMATED COUNT: 57.4 FL (ref 35.9–50)
GLUCOSE SERPL-MCNC: 133 MG/DL (ref 65–99)
HCT VFR BLD AUTO: 28.4 % (ref 42–52)
HGB BLD-MCNC: 8.6 G/DL (ref 14–18)
INR PPP: 1.36 (ref 0.87–1.13)
MCH RBC QN AUTO: 30.5 PG (ref 27–33)
MCHC RBC AUTO-ENTMCNC: 30.3 G/DL (ref 33.7–35.3)
MCV RBC AUTO: 100.7 FL (ref 81.4–97.8)
PLATELET # BLD AUTO: 176 K/UL (ref 164–446)
PMV BLD AUTO: 10.1 FL (ref 9–12.9)
POTASSIUM SERPL-SCNC: 4.8 MMOL/L (ref 3.6–5.5)
PROTHROMBIN TIME: 17.1 SEC (ref 12–14.6)
RBC # BLD AUTO: 2.82 M/UL (ref 4.7–6.1)
SODIUM SERPL-SCNC: 138 MMOL/L (ref 135–145)
WBC # BLD AUTO: 8.9 K/UL (ref 4.8–10.8)

## 2019-08-04 PROCEDURE — 80048 BASIC METABOLIC PNL TOTAL CA: CPT

## 2019-08-04 PROCEDURE — 700102 HCHG RX REV CODE 250 W/ 637 OVERRIDE(OP): Performed by: HOSPITALIST

## 2019-08-04 PROCEDURE — 85027 COMPLETE CBC AUTOMATED: CPT

## 2019-08-04 PROCEDURE — A9270 NON-COVERED ITEM OR SERVICE: HCPCS | Performed by: INTERNAL MEDICINE

## 2019-08-04 PROCEDURE — 99291 CRITICAL CARE FIRST HOUR: CPT | Performed by: INTERNAL MEDICINE

## 2019-08-04 PROCEDURE — 700111 HCHG RX REV CODE 636 W/ 250 OVERRIDE (IP): Performed by: HOSPITALIST

## 2019-08-04 PROCEDURE — 99233 SBSQ HOSP IP/OBS HIGH 50: CPT | Performed by: HOSPITALIST

## 2019-08-04 PROCEDURE — 85610 PROTHROMBIN TIME: CPT

## 2019-08-04 PROCEDURE — 99233 SBSQ HOSP IP/OBS HIGH 50: CPT | Performed by: INTERNAL MEDICINE

## 2019-08-04 PROCEDURE — 770022 HCHG ROOM/CARE - ICU (200)

## 2019-08-04 PROCEDURE — A9270 NON-COVERED ITEM OR SERVICE: HCPCS | Performed by: HOSPITALIST

## 2019-08-04 PROCEDURE — 700102 HCHG RX REV CODE 250 W/ 637 OVERRIDE(OP): Performed by: INTERNAL MEDICINE

## 2019-08-04 RX ADMIN — Medication 2001 MG: at 17:28

## 2019-08-04 RX ADMIN — MIDODRINE HYDROCHLORIDE 15 MG: 5 TABLET ORAL at 17:28

## 2019-08-04 RX ADMIN — HYDROCORTISONE SODIUM SUCCINATE 50 MG: 100 INJECTION, POWDER, FOR SOLUTION INTRAMUSCULAR; INTRAVENOUS at 13:18

## 2019-08-04 RX ADMIN — OXYCODONE HYDROCHLORIDE 5 MG: 5 TABLET ORAL at 22:48

## 2019-08-04 RX ADMIN — HYDROCORTISONE SODIUM SUCCINATE 50 MG: 100 INJECTION, POWDER, FOR SOLUTION INTRAMUSCULAR; INTRAVENOUS at 22:48

## 2019-08-04 RX ADMIN — OMEPRAZOLE 40 MG: 20 CAPSULE, DELAYED RELEASE ORAL at 04:34

## 2019-08-04 RX ADMIN — Medication 2001 MG: at 08:58

## 2019-08-04 RX ADMIN — FLUTICASONE PROPIONATE 88 MCG: 44 AEROSOL, METERED RESPIRATORY (INHALATION) at 04:37

## 2019-08-04 RX ADMIN — HYDROCORTISONE SODIUM SUCCINATE 50 MG: 100 INJECTION, POWDER, FOR SOLUTION INTRAMUSCULAR; INTRAVENOUS at 04:34

## 2019-08-04 RX ADMIN — SENNOSIDES, DOCUSATE SODIUM 2 TABLET: 50; 8.6 TABLET, FILM COATED ORAL at 04:34

## 2019-08-04 RX ADMIN — AMIODARONE HYDROCHLORIDE 200 MG: 200 TABLET ORAL at 04:34

## 2019-08-04 RX ADMIN — SENNOSIDES, DOCUSATE SODIUM 2 TABLET: 50; 8.6 TABLET, FILM COATED ORAL at 17:28

## 2019-08-04 RX ADMIN — MIDODRINE HYDROCHLORIDE 15 MG: 5 TABLET ORAL at 08:58

## 2019-08-04 RX ADMIN — Medication 2001 MG: at 13:19

## 2019-08-04 RX ADMIN — PRAVASTATIN SODIUM 20 MG: 20 TABLET ORAL at 20:44

## 2019-08-04 RX ADMIN — OXYCODONE HYDROCHLORIDE 5 MG: 5 TABLET ORAL at 15:36

## 2019-08-04 RX ADMIN — MIDODRINE HYDROCHLORIDE 15 MG: 5 TABLET ORAL at 13:19

## 2019-08-04 RX ADMIN — UMECLIDINIUM BROMIDE AND VILANTEROL TRIFENATATE 1 PUFF: 62.5; 25 POWDER RESPIRATORY (INHALATION) at 04:36

## 2019-08-04 RX ADMIN — HYDROCORTISONE SODIUM SUCCINATE 50 MG: 100 INJECTION, POWDER, FOR SOLUTION INTRAMUSCULAR; INTRAVENOUS at 00:07

## 2019-08-04 ASSESSMENT — COGNITIVE AND FUNCTIONAL STATUS - GENERAL
MOVING TO AND FROM BED TO CHAIR: A LITTLE
MOVING FROM LYING ON BACK TO SITTING ON SIDE OF FLAT BED: A LITTLE
DAILY ACTIVITIY SCORE: 22
STANDING UP FROM CHAIR USING ARMS: A LITTLE
SUGGESTED CMS G CODE MODIFIER MOBILITY: CK
DRESSING REGULAR LOWER BODY CLOTHING: A LITTLE
SUGGESTED CMS G CODE MODIFIER DAILY ACTIVITY: CJ
CLIMB 3 TO 5 STEPS WITH RAILING: A LITTLE
WALKING IN HOSPITAL ROOM: A LITTLE
MOBILITY SCORE: 19
HELP NEEDED FOR BATHING: A LITTLE

## 2019-08-04 ASSESSMENT — ENCOUNTER SYMPTOMS
WEIGHT LOSS: 0
LOSS OF CONSCIOUSNESS: 0
ABDOMINAL PAIN: 0
SORE THROAT: 0
ORTHOPNEA: 0
VOMITING: 0
FOCAL WEAKNESS: 0
HEMOPTYSIS: 0
BLURRED VISION: 0
WHEEZING: 0
HEADACHES: 0
EYES NEGATIVE: 1
SHORTNESS OF BREATH: 0
SPUTUM PRODUCTION: 0
SINUS PAIN: 0
COUGH: 0
PALPITATIONS: 0
BACK PAIN: 0
NAUSEA: 0
CHILLS: 0
FEVER: 0
DIZZINESS: 0
DOUBLE VISION: 0
DEPRESSION: 0
DIARRHEA: 0

## 2019-08-04 ASSESSMENT — LIFESTYLE VARIABLES
TOTAL SCORE: 0
HAVE YOU EVER FELT YOU SHOULD CUT DOWN ON YOUR DRINKING: NO
HOW MANY TIMES IN THE PAST YEAR HAVE YOU HAD 5 OR MORE DRINKS IN A DAY: 0
TOTAL SCORE: 0
HAVE PEOPLE ANNOYED YOU BY CRITICIZING YOUR DRINKING: NO
ALCOHOL_USE: YES
EVER FELT BAD OR GUILTY ABOUT YOUR DRINKING: NO
DOES PATIENT WANT TO STOP DRINKING: NO
EVER HAD A DRINK FIRST THING IN THE MORNING TO STEADY YOUR NERVES TO GET RID OF A HANGOVER: NO
CONSUMPTION TOTAL: NEGATIVE
ON A TYPICAL DAY WHEN YOU DRINK ALCOHOL HOW MANY DRINKS DO YOU HAVE: 1
TOTAL SCORE: 0
AVERAGE NUMBER OF DAYS PER WEEK YOU HAVE A DRINK CONTAINING ALCOHOL: 0

## 2019-08-04 NOTE — PROGRESS NOTES
Nephrology Daily Progress Note    Date of Service  8/4/2019    Chief Complaint  79 y.o. male with ESRD/HD,admitted 7/28/2019 with worsening SOB, hyperkalemia    Interval Problem Update  Hypotensive -on Levophed  Received temporary catheter femoral catheter -as AVF non functioning well  Large pericardial effusion -s/p pericardiocenthesis -drain removed  Hyperkalemia -corrected with HD  HD MWF  Review of Systems  Review of Systems   Constitutional: Positive for malaise/fatigue. Negative for chills, fever and weight loss.   HENT: Negative for congestion, hearing loss and sinus pain.    Eyes: Negative.    Respiratory: Negative for cough, hemoptysis, shortness of breath and wheezing.    Cardiovascular: Negative for chest pain, palpitations, orthopnea and leg swelling.   Gastrointestinal: Negative for abdominal pain, nausea and vomiting.   Skin: Negative.    All other systems reviewed and are negative.       Physical Exam  Pulse:  [72-95] 79  Resp:  [12-33] 20  BP: (112)/(43) 112/43  SpO2:  [91 %-100 %] 93 %    Physical Exam   Constitutional: He is oriented to person, place, and time. He appears well-developed and well-nourished. No distress.   HENT:   Head: Normocephalic and atraumatic.   Nose: Nose normal.   Mouth/Throat: Oropharynx is clear and moist.   Eyes: Pupils are equal, round, and reactive to light. Conjunctivae and EOM are normal.   Neck: Normal range of motion. Neck supple. No thyromegaly present.   Cardiovascular: Normal rate and regular rhythm. Exam reveals no gallop and no friction rub.   Pulmonary/Chest: Effort normal and breath sounds normal. No respiratory distress. He has no wheezes. He has no rales.   Abdominal: Soft. Bowel sounds are normal. He exhibits no distension and no mass. There is no tenderness. There is no guarding.   Musculoskeletal: He exhibits edema.   Neurological: He is alert and oriented to person, place, and time.   Skin: Skin is warm. No rash noted. No erythema.   Nursing note and  vitals reviewed.      Fluids    Intake/Output Summary (Last 24 hours) at 8/4/2019 0739  Last data filed at 8/4/2019 0600  Gross per 24 hour   Intake 182.46 ml   Output 2000 ml   Net -1817.54 ml       Laboratory  Recent Labs     08/02/19  0430 08/03/19  0518 08/04/19  0440   WBC 7.1 6.9 8.9   RBC 2.62* 2.62* 2.82*   HEMOGLOBIN 8.0* 8.1* 8.6*   HEMATOCRIT 26.1* 25.2* 28.4*   MCV 99.6* 96.2 100.7*   MCH 30.5 30.9 30.5   MCHC 30.7* 32.1* 30.3*   RDW 54.9* 53.1* 57.4*   PLATELETCT 203 193 176   MPV 10.6 10.6 10.1     Recent Labs     08/02/19  0430 08/03/19 0518 08/04/19  0440   SODIUM 135 135 138   POTASSIUM 5.4 5.3 4.8   CHLORIDE 94* 96 98   CO2 20 23 26   GLUCOSE 73 113* 133*   * 82* 77*   CREATININE 9.46* 8.13* 7.04*   CALCIUM 9.1 8.8 8.9     Recent Labs     08/02/19 0430 08/03/19  0518 08/04/19  0440   INR 2.04* 1.72* 1.36*     No results for input(s): NTPROBNP in the last 72 hours.        Imaging  CT reviewed, ECHO reviewed, AVF US reviewed    Assessment/Plan  1.ESRD/HD -will dialyze tomorrow  2.Hypotensive - on Levophed  3.Electrolytes: hyperkalemia - correctedwith HD  4.Anemia: Hb stable  5.AVF mulfunction -appreciate  and Dr. Spaulding help  Recs; daily BMP             HD MWF             Renal/low K diet!             Daily BMP

## 2019-08-04 NOTE — PROGRESS NOTES
"Critical Care Progress Note    Date of admission  7/28/2019    Chief Complaint  79 y.o. male admitted 7/28/2019 with pericardial effusion with tamponade    Hospital Course    \"79 y.o. male with past medical history of systolic congestive heart failure, atrial flutter on anticoagulation with Coumadin, coronary disease, ACS, end-stage renal disease on HD, COPD, hypercholesterolemia, hypertension, PVD who presented 7/28/2019 with shortness of breath and lightheadedness.  He was admitted for volume overload and hyperkalemia and was emergently dialyzed.  A CT scan of his abdomen was obtained on 7/30/2019 which showed a very large pericardial effusion and was followed by an echocardiogram 7/31/19 which showed evidence of significant inflow variation concerning for possible tamponade.  His INR was noted to be elevated at 3.42 and he was given K Centra, vitamin K with improvement to 3.02 then 2.35.  Cardiology planning a pericardiocentesis following administration of 2 units of FFP.  This afternoon the patient's blood pressure became worse with systolic blood pressures in the 60s and 50s.  He was transferred to the ICU and cardiology was recontacted for emergent pericardiocentesis.  At this time the patient has minimally symptomatic being lightheaded and epigastric/retrosternal pain.  Noninvasive blood pressure measurements are difficult to obtain due to the patient's bilateral upper extremity AV fistulas (left currently being used for HD and right being matured), all NIBP measurements are being obtained on his left lower extremity.  Thoracic surgery has also been consulted for pericardial window following drainage and correction of coagulopathy.  He denies any recent infectious symptoms.\"      Interval Problem Update  Reviewed last 24 hour events:  T-max 98.2  -1.8 L over the last 24 hours, -8 L since admit  US HD access nonocclusive peripheral mural thrombus, nonocclusive thrombus in the proximal forearm cephalic vein as " well, flow reduced  No CXR this a.m.  K 4.8    Pericardial fluid 8/1 NGTD    Levophed@2  Hydrocortisone 50 mg every 6 hours  Midodrine 15 mg 3 times daily    93% on RA  Last BM 8/1      Review of Systems  Review of Systems   Constitutional: Negative for chills and fever.   HENT: Negative for congestion.    Eyes: Negative for blurred vision.   Respiratory: Negative for cough and sputum production.    Cardiovascular: Negative for chest pain and palpitations.   Gastrointestinal: Negative for nausea and vomiting.   Neurological: Negative for focal weakness.   Psychiatric/Behavioral: Negative for depression.   All other systems reviewed and are negative.       Vital Signs for last 24 hours   Pulse:  [72-95] 79  Resp:  [12-33] 20  BP: (112)/(43) 112/43  SpO2:  [91 %-100 %] 93 %    Hemodynamic parameters for last 24 hours       Respiratory Information for the last 24 hours       Physical Exam   Physical Exam   Constitutional: He is oriented to person, place, and time.   Chronically ill-appearing   HENT:   Head: Normocephalic and atraumatic.   Eyes: Pupils are equal, round, and reactive to light. Conjunctivae are normal.   Neck: No tracheal deviation present.   Cardiovascular: Normal rate and intact distal pulses.   Murmur heard.  Pulmonary/Chest: He has no wheezes. He has no rales.   Pericardial drain in place   Abdominal: Soft. Bowel sounds are normal. He exhibits no distension.   Musculoskeletal: He exhibits no edema.   Right forearm AV fistula with palpable thrill, left forearm AV fistula with no palpable thrill   Neurological: He is alert and oriented to person, place, and time. No cranial nerve deficit.   Skin: Skin is warm and dry. He is not diaphoretic.   Psychiatric: He has a normal mood and affect.   Nursing note and vitals reviewed.      Medications  Current Facility-Administered Medications   Medication Dose Route Frequency Provider Last Rate Last Dose   • diphenhydrAMINE (BENADRYL) injection 25 mg  25 mg  Intravenous HS PRN Jeremy M Gonda, M.D.   25 mg at 08/03/19 0001   • umeclidinium-vilanterol (ANORO ELLIPTA) inhaler 1 Puff  1 Puff Inhalation DAILY Deniz Warner Jr. D.O.   1 Puff at 08/04/19 0436    And   • fluticasone (FLOVENT HFA) 44 MCG/ACT inhaler 88 mcg  2 Puff Inhalation DAILY Deniz Warner Jr., D.O.   88 mcg at 08/04/19 0437   • hydrocortisone sodium succinate PF (SOLU-CORTEF) 100 MG injection 50 mg  50 mg Intravenous Q6HRS Jey Sanches D.O.   50 mg at 08/04/19 0434   • midodrine (PROAMATINE) tablet 15 mg  15 mg Oral TID WITH MEALS Deniz Warner Jr. D.O.   15 mg at 08/03/19 1743   • ALPRAZolam (XANAX) tablet 0.5 mg  0.5 mg Oral HS PRN Deniz Warner Jr., D.O.       • glucose 4 g chewable tablet 16 g  16 g Oral Q15 MIN PRN Jey Sanches D.O.        And   • DEXTROSE 10% BOLUS 250 mL  250 mL Intravenous Q15 MIN PRN Jey Sanches D.O.       • norepinephrine (LEVOPHED) 8 mg in  mL Infusion  0-30 mcg/min Intravenous Continuous Jeremy M Gonda, M.D. 3.8 mL/hr at 08/04/19 0500 2 mcg/min at 08/04/19 0500   • amiodarone (CORDARONE) tablet 200 mg  200 mg Oral DAILY Jonathan Shetty M.D.   200 mg at 08/04/19 0434   • calcium acetate (PHOS-LO) 667 MG tablet 2,001 mg  2,001 mg Oral TID WITH MEALS Jonathan Shetty M.D.   2,001 mg at 08/03/19 1743   • levalbuterol (XOPENEX) 1.25 MG/3ML nebulizer solution 1.25 mg  1.25 mg Nebulization Q4H PRN (RT) Jonathan Shetty M.D.       • omeprazole (PRILOSEC) capsule 40 mg  40 mg Oral QAM Jonathan Shetty M.D.   40 mg at 08/04/19 0434   • pravastatin (PRAVACHOL) tablet 20 mg  20 mg Oral Nightly Jonathan Shetty M.D.   20 mg at 08/03/19 2045   • acetaminophen (TYLENOL) tablet 650 mg  650 mg Oral Q6HRS PRN Jonathan Shetty M.D.   650 mg at 07/31/19 2207   • Pharmacy Consult Request ...Pain Management Review 1 Each  1 Each Other PHARMACY TO DOSE Jonatahn Shetty M.D.        And   • oxyCODONE immediate-release (ROXICODONE) tablet 2.5 mg  2.5 mg Oral  Q3HRS PRJARRELL Shetty M.D.        And   • oxyCODONE immediate-release (ROXICODONE) tablet 5 mg  5 mg Oral Q3HRS PRJARRELL Shetty M.D.   5 mg at 08/01/19 2327    And   • morphine (pf) 4 mg/ml injection 2 mg  2 mg Intravenous Q3HRS PRJARRELL Shtety M.D.   2 mg at 07/29/19 2025   • ondansetron (ZOFRAN) syringe/vial injection 4 mg  4 mg Intravenous Q4HRS PRJARRELL Shetty M.D.       • ondansetron (ZOFRAN ODT) dispertab 4 mg  4 mg Oral Q4HRS PRJARRELL Shetty M.D.       • senna-docusate (PERICOLACE or SENOKOT S) 8.6-50 MG per tablet 2 Tab  2 Tab Oral BID Jonathan Shetty M.D.   2 Tab at 08/04/19 0434    And   • polyethylene glycol/lytes (MIRALAX) PACKET 1 Packet  1 Packet Oral QDAY PRJARRELL Shetty M.D.   1 Packet at 08/01/19 1713    And   • magnesium hydroxide (MILK OF MAGNESIA) suspension 30 mL  30 mL Oral QDAY PRJARRELL Shetty M.D.        And   • bisacodyl (DULCOLAX) suppository 10 mg  10 mg Rectal QDAY PRJARRELL Shetty M.D.       • lidocaine (XYLOCAINE) 1 % injection 1 mL  1 mL Other DIALYSIS ZEINAB Larson M.D.   1 mL at 08/01/19 1915   • heparin injection 1,500 Units  1,500 Units Intravenous DIALYSIS ZEINAB Larson M.D.   1,500 Units at 07/31/19 0755       Fluids    Intake/Output Summary (Last 24 hours) at 8/4/2019 0724  Last data filed at 8/4/2019 0600  Gross per 24 hour   Intake 182.46 ml   Output 2000 ml   Net -1817.54 ml       Laboratory          Recent Labs     08/02/19  0430 08/03/19  0518 08/04/19  0440   SODIUM 135 135 138   POTASSIUM 5.4 5.3 4.8   CHLORIDE 94* 96 98   CO2 20 23 26   * 82* 77*   CREATININE 9.46* 8.13* 7.04*   CALCIUM 9.1 8.8 8.9     Recent Labs     08/02/19  0430 08/03/19 0518 08/04/19 0440   ALTSGPT 497* 340*  --    ASTSGOT 156* 67*  --    ALKPHOSPHAT 148* 137*  --    TBILIRUBIN 1.1 1.0  --    GLUCOSE 73 113* 133*     Recent Labs     08/01/19  1540 08/02/19  0430 08/03/19 0518 08/04/19 0440   WBC  --  7.1 6.9 8.9   NEUTSPOLYS  --   --   93.80*  --    LYMPHOCYTES  --   --  2.00*  --    MONOCYTES  --   --  3.60  --    EOSINOPHILS 1  --  0.00  --    BASOPHILS  --   --  0.00  --    ASTSGOT  --  156* 67*  --    ALTSGPT  --  497* 340*  --    ALKPHOSPHAT  --  148* 137*  --    TBILIRUBIN  --  1.1 1.0  --      Recent Labs     08/02/19  0430 08/03/19  0518 08/04/19  0440   RBC 2.62* 2.62* 2.82*   HEMOGLOBIN 8.0* 8.1* 8.6*   HEMATOCRIT 26.1* 25.2* 28.4*   PLATELETCT 203 193 176   PROTHROMBTM 23.7* 20.7* 17.1*   INR 2.04* 1.72* 1.36*       Imaging  X-Ray:  No film today    Assessment/Plan  * Pericardial tamponade- (present on admission)  Assessment & Plan  Hemodynamically significant  S/P emergent pericardial drainage  Cardiology on board  Pericardial drain removed 8/3  Possible pericardial window if there is reaccumulation    Elevated transaminase level  Assessment & Plan  Likely hepatic congestion or ischemic hepatitis  Trending back down    Delirium  Assessment & Plan  Keep patient awake during the day and avoid daytime naps. Remove all unnecessary lines (central lines, peripheral IVs, feeding tubes, spicer catheters). Avoid polypharmacy, frequent re-orientation, maximize family time at bedside, use glasses and hearing aids if needed, treat pain, encourage ambulation, minimize benzos/anticholinergic agents.   Family states the patient's nocturnal regimen for sleep is both uneffective and makes him more confused. The patient received trazadone (home dose), ativan and haldol last night. Will transition to xanax monotherapy after discussion with family, risks/benefits of benzo use reviewed including worsening of delirium.    Shock (HCC)  Assessment & Plan  Obstructive component secondary to pericardial tamponade relieved with pericardial drain  Continue Art line  Continue midodrine and hydrocortisone-change to q. 8  Continue vasopressor to maintain map greater than 65    Paroxysmal atrial fibrillation (HCC)- (present on admission)  Assessment & Plan  Continue  amiodarone  Currently not on AC    Chronic anticoagulation- (present on admission)  Assessment & Plan  With coagulopathy due to Coumadin use  Has received Kcentra and vitamin K, 2 units FFP  Risk may outweigh benefit of ongoing AC in the future    Aortic stenosis- (present on admission)  Assessment & Plan  Echo in 2018: AV Peak Velocity 3.1 m/s, Peak Gradient 38.3 mmHg, AV Mean Gradient 21.6   Echo 8/2/19: AV Peak Velocity 3.4 m/s, AV Peak Gradient 46.3 mmHg, AV Mean Gradient 26.4  Slightly worsened    Essential hypertension- (present on admission)  Assessment & Plan  Currently hypotensive    ESRD (end stage renal disease) on dialysis (HCC)- (present on admission)  Assessment & Plan  Ultrasound of left graft showing reduced flow and nonocclusive thrombus  Right side sclerotic and nonfunctioning  HD per nephrology  Vascular following for access  Left femoral temporary HD catheter placed 8/3  Plan for permanent access this week per vascular    Hyperkalemia, diminished renal excretion- (present on admission)  Assessment & Plan  Improved with HD    Anemia in CKD (chronic kidney disease)- (present on admission)  Assessment & Plan  Monitor for need to transfuse, transfusion threshold: Hemoglobin <7    Dyslipidemia- (present on admission)  Assessment & Plan  Continue pravastatin    COPD (chronic obstructive pulmonary disease) (HCC)- (present on admission)  Assessment & Plan  Not currently in exacerbation  Continue home Anoro Ellipta, Flovent and Xopenex  RT/O2 protocols  Goal sats 88 to 92%    BPH (benign prostatic hypertrophy)- (present on admission)  Assessment & Plan  On Flomax as OP       VTE:  Contraindicated  Ulcer: PPI  Lines: Central Line  Ongoing indication addressed and Arterial Line  Ongoing indication addressed    I have performed a physical exam and reviewed and updated ROS and Plan today (8/4/2019). In review of yesterday's note (8/3/2019), there are no changes except as documented above.       Discussed  patient condition and risk of morbidity and/or mortality with Hospitalist, Family, RN, RT, Pharmacy, Patient and general surgery     The patient remains critically ill.  Critical care time = 38 minutes in directly providing and coordinating critical care and extensive data review.  No time overlap and excludes procedures.

## 2019-08-04 NOTE — ASSESSMENT & PLAN NOTE
L arm fistula now inactive  Status post creation of right upper extremity fistula  Permacath placement

## 2019-08-04 NOTE — PROGRESS NOTES
Hospital Medicine Daily Progress Note    Date of Service  8/4/2019    Chief Complaint  79 y.o. male admitted 7/28/2019 with weakness and SOB    Hospital Course    Hx ESRD on HD, Systolic CHF, AFib/Flttr, COPD, HLD, HTN, PVD, CAD, AC on coumadin, distant Hx of pericardial effusion.  Presented with above and in ED found to be hyperkalemic, in volume overload and had emergent HD.  CT done on 7/30 for abd pain showing large pericardial effusion.  Came to the ICU on 8/1 with hypotension.  Echo done showing tamponade physiology and went to cath lab for pericardial drain.  1300ml out on placement.  Prior to the procedure he was given KCentra, FFP and Vit K for INR of 3.4.    Post procedure remained hypotensive and on pressors  4      Interval Problem [place]  Confused overnight but this am is oriented  Reports the pain in his belly is gone  Sinus 70-80s  Levo 3  AFebrile  tolerating  Anuric      Consultants/Specialty  Cardiology  Thoracic Surgery  Pulmonology    Code Status  full    Disposition  Cont in ICU on pressors    Review of Systems  Review of Systems   Constitutional: Negative for chills and fever.   HENT: Negative for nosebleeds and sore throat.    Eyes: Negative for blurred vision and double vision.   Respiratory: Negative for cough and shortness of breath.    Cardiovascular: Negative for chest pain, palpitations and leg swelling.   Gastrointestinal: Negative for abdominal pain, diarrhea, nausea and vomiting.   Genitourinary: Negative for dysuria and urgency.   Musculoskeletal: Negative for back pain.   Skin: Negative for rash.   Neurological: Negative for dizziness, loss of consciousness and headaches.        Physical Exam  Pulse:  [72-95] 78  Resp:  [12-33] 18  BP: (112)/(43) 112/43  SpO2:  [91 %-100 %] 95 %    Physical Exam   Constitutional: He is oriented to person, place, and time. He appears well-developed and well-nourished. No distress.   HENT:   Head: Normocephalic and atraumatic.   Nose: Nose normal.    Mouth/Throat: Oropharynx is clear and moist.   Eyes: Conjunctivae are normal.   Neck: No JVD present.   Cardiovascular: Normal rate. Exam reveals no gallop.   Murmur heard.  Pulmonary/Chest: Effort normal. No stridor. No respiratory distress. He has no wheezes. He has no rales.   Abdominal: Soft. There is no tenderness. There is no rebound and no guarding.   Musculoskeletal: He exhibits no edema.   Fistula L arm, no thrill   Neurological: He is alert and oriented to person, place, and time.   Skin: Skin is warm and dry. No rash noted. He is not diaphoretic.   Psychiatric: He has a normal mood and affect. His behavior is normal. Judgment and thought content normal.   Nursing note and vitals reviewed.      Fluids    Intake/Output Summary (Last 24 hours) at 8/4/2019 0557  Last data filed at 8/4/2019 0400  Gross per 24 hour   Intake 184.26 ml   Output 2007.5 ml   Net -1823.24 ml       Laboratory  Recent Labs     08/02/19 0430 08/03/19 0518 08/04/19  0440   WBC 7.1 6.9 8.9   RBC 2.62* 2.62* 2.82*   HEMOGLOBIN 8.0* 8.1* 8.6*   HEMATOCRIT 26.1* 25.2* 28.4*   MCV 99.6* 96.2 100.7*   MCH 30.5 30.9 30.5   MCHC 30.7* 32.1* 30.3*   RDW 54.9* 53.1* 57.4*   PLATELETCT 203 193 176   MPV 10.6 10.6 10.1     Recent Labs     08/02/19 0430 08/03/19 0518 08/04/19  0440   SODIUM 135 135 138   POTASSIUM 5.4 5.3 4.8   CHLORIDE 94* 96 98   CO2 20 23 26   GLUCOSE 73 113* 133*   * 82* 77*   CREATININE 9.46* 8.13* 7.04*   CALCIUM 9.1 8.8 8.9     Recent Labs     08/02/19 0430 08/03/19 0518 08/04/19  0440   INR 2.04* 1.72* 1.36*               Imaging  US-HEMODIALYSIS GRAFT DUPLEX COMP UPPER EXTREMITY   Final Result      EC-ECHOCARDIOGRAM COMPLETE W/O CONT   Final Result      EC-ECHOCARDIOGRAM LTD W/O CONT   Final Result      DX-CHEST-LIMITED (1 VIEW)   Final Result      1.  No evidence of pneumothorax following RIGHT neck catheter placement   2.  Persistently enlarged cardiac silhouette   3.  Atherosclerosis      EC-ECHOCARDIOGRAM  LTD W/O CONT   Final Result      CT-ABDOMEN-PELVIS W/O   Final Result      No CT evidence of acute inflammatory process in the abdomen or pelvis      Very large pericardial effusion has increased from 2016      Small right pleural effusion has enlarged      Aortic valvular calcifications indicating aortic stenosis. There is also severe coronary disease      Atrophic kidneys with cysts. Hepatic cysts are also seen.      Moderate distal colonic diverticulosis without evidence of diverticulitis      US-HEMODIALYSIS GRAFT DUPLEX COMP UPPER EXTREMITY   Final Result      [id number] VIEW   Final Result      1.  No evidence of bowel obstruction.   2.  Abdominal aortic and mesenteric vascular calcifications.      DX-CHEST-PORTABLE (1 VIEW)   Final Result      1.  Stable cardiomegaly.   2.  No acute abnormality.      CL-PERICARDIOCENTESIS    (Results Pending)   IR-CVC NON TUNNELED > AGE 5    (Results Pending)        Assessment/Plan  * Pericardial tamponade- (present on admission)  Assessment & Plan  S/p drain of 1300ml bloody fluid  ?coumadin vs uremic  May yet need window however not medically stable at this time for surgery    Elevated transaminase level  Assessment & Plan  ?secondary to hepatic congestion  Has been trending down  Cont to follow: repeat lab tomorrow    Delirium  Assessment & Plan  resolved  Avoid sedating medications  mobilize    Shock (HCC)  Assessment & Plan  Pump vs BRITTON vs less likely sepsis or volume  Suspect due to BRITTON:  Midodrine   Cont to titrate pressors (Levophed)  Repeat Echo showing relatively good function    Paroxysmal atrial fibrillation (HCC)- (present on admission)  Assessment & Plan  With  A flutter   AC has been reversed; hold further AC as drain still in place, possible further procedures and bloody effusion  Continue amiodarone  Do not plan on resuming AC in setting of bloody pericardial effusion      Chronic anticoagulation- (present on admission)  Assessment & Plan  Reversed a  Do  not plan on re-starting given bloody pericardial effusion    Uremia- (present on admission)  Assessment & Plan  HD per Nephro    Aortic stenosis- (present on admission)  Assessment & Plan  May be a TAVR candidate at some point if we can resolve his current acute issues    Hypotension  Assessment & Plan  Initially cardiogenic shock due to tamponade however repeat Echo today after drain shows that physiology has resolved however cont's to be hypotensive.  Midodrine 15 TID  DC art line  dificult to get accurate pressures however we can't leave the pt in bed indefinitely due to an art line particularly as he is ASx'c.  DC Levo    Essential hypertension- (present on admission)  Assessment & Plan  Unstable BP with hypotensive episodes , likely due to Pericardial tamponade.   Not an acute issue  Hold antihypertensives.     ESRD (end stage renal disease) on dialysis (HCC)- (present on admission)  Assessment & Plan  Post emergent hemodialysis 7/29/2019 and daily HD with persistent hyperkal 6.4  --> 5.3  --> 5.8  Fistulas may not be effective . Discussed with Dr. Guerra- Nephrology   Low k, renal diet  Lisinopril stopped.   Hold diuretics for now due to low BP  dificult acces as partially thrombosed L AVF, R is maturing.    Now with Fem HD cath      Hyperkalemia, diminished renal excretion- (present on admission)  Assessment & Plan  Post emergent hemodialysis per  Dr. Larson with persistent hyperkalemia despite daily HD.  Possible poorly functional  fistula    Monitor tele   Ace inh stopped  Low k diet.   Discussed with Dr. Guerra.     Anemia in CKD (chronic kidney disease)- (present on admission)  Assessment & Plan  No symptoms of bleeding.   Monitor Hgb  Transfuse if needed for hemoglobin less than 7 gm/dL      Dyslipidemia- (present on admission)  Assessment & Plan  On statin therapy.  Monitor.     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  Pt is OK to get to bedside chair but no ambulation    COPD (chronic obstructive pulmonary disease) (HCC)- (present on admission)  Assessment & Plan  Without current exacerbation.   Respiratory therapy as needed.   Continue scheduled Flovent, As needed Xopenex with scheduled Ellipta  Rt protocols      BPH (benign prostatic hypertrophy)- (present on admission)  Assessment & Plan  Continue with Flomax.  Monitor for urinary retention symptoms.       VTE prophylaxis: none as pericardial fluid bloody

## 2019-08-05 ENCOUNTER — TELEPHONE (OUTPATIENT)
Dept: CARDIOLOGY | Facility: MEDICAL CENTER | Age: 79
End: 2019-08-05

## 2019-08-05 ENCOUNTER — APPOINTMENT (OUTPATIENT)
Dept: RADIOLOGY | Facility: MEDICAL CENTER | Age: 79
DRG: 628 | End: 2019-08-05
Attending: SURGERY
Payer: MEDICARE

## 2019-08-05 DIAGNOSIS — I31.39 PERICARDIAL EFFUSION: ICD-10-CM

## 2019-08-05 LAB
ALBUMIN SERPL BCP-MCNC: 3.1 G/DL (ref 3.2–4.9)
ALBUMIN/GLOB SERPL: 1 G/DL
ALP SERPL-CCNC: 154 U/L (ref 30–99)
ALT SERPL-CCNC: 183 U/L (ref 2–50)
ANION GAP SERPL CALC-SCNC: 15 MMOL/L (ref 0–11.9)
AST SERPL-CCNC: 19 U/L (ref 12–45)
BACTERIA FLD AEROBE CULT: NORMAL
BASOPHILS # BLD AUTO: 0.1 % (ref 0–1.8)
BASOPHILS # BLD: 0.01 K/UL (ref 0–0.12)
BILIRUB SERPL-MCNC: 0.7 MG/DL (ref 0.1–1.5)
BUN SERPL-MCNC: 100 MG/DL (ref 8–22)
CALCIUM SERPL-MCNC: 9 MG/DL (ref 8.5–10.5)
CHLORIDE SERPL-SCNC: 95 MMOL/L (ref 96–112)
CO2 SERPL-SCNC: 25 MMOL/L (ref 20–33)
CREAT SERPL-MCNC: 8.62 MG/DL (ref 0.5–1.4)
CYTOLOGY REG CYTOL: NORMAL
EOSINOPHIL # BLD AUTO: 0 K/UL (ref 0–0.51)
EOSINOPHIL NFR BLD: 0 % (ref 0–6.9)
ERYTHROCYTE [DISTWIDTH] IN BLOOD BY AUTOMATED COUNT: 57.7 FL (ref 35.9–50)
GLOBULIN SER CALC-MCNC: 3.1 G/DL (ref 1.9–3.5)
GLUCOSE SERPL-MCNC: 112 MG/DL (ref 65–99)
GRAM STN SPEC: NORMAL
HCT VFR BLD AUTO: 28.1 % (ref 42–52)
HGB BLD-MCNC: 8.6 G/DL (ref 14–18)
IMM GRANULOCYTES # BLD AUTO: 0.05 K/UL (ref 0–0.11)
IMM GRANULOCYTES NFR BLD AUTO: 0.6 % (ref 0–0.9)
LYMPHOCYTES # BLD AUTO: 0.27 K/UL (ref 1–4.8)
LYMPHOCYTES NFR BLD: 3.1 % (ref 22–41)
MAGNESIUM SERPL-MCNC: 2 MG/DL (ref 1.5–2.5)
MCH RBC QN AUTO: 30.6 PG (ref 27–33)
MCHC RBC AUTO-ENTMCNC: 30.6 G/DL (ref 33.7–35.3)
MCV RBC AUTO: 100 FL (ref 81.4–97.8)
MONOCYTES # BLD AUTO: 0.42 K/UL (ref 0–0.85)
MONOCYTES NFR BLD AUTO: 4.9 % (ref 0–13.4)
NEUTROPHILS # BLD AUTO: 7.83 K/UL (ref 1.82–7.42)
NEUTROPHILS NFR BLD: 91.3 % (ref 44–72)
NRBC # BLD AUTO: 0.02 K/UL
NRBC BLD-RTO: 0.2 /100 WBC
PLATELET # BLD AUTO: 161 K/UL (ref 164–446)
PMV BLD AUTO: 10.4 FL (ref 9–12.9)
POTASSIUM SERPL-SCNC: 5.2 MMOL/L (ref 3.6–5.5)
PROT SERPL-MCNC: 6.2 G/DL (ref 6–8.2)
RBC # BLD AUTO: 2.81 M/UL (ref 4.7–6.1)
SIGNIFICANT IND 70042: NORMAL
SITE SITE: NORMAL
SODIUM SERPL-SCNC: 135 MMOL/L (ref 135–145)
SOURCE SOURCE: NORMAL
WBC # BLD AUTO: 8.6 K/UL (ref 4.8–10.8)

## 2019-08-05 PROCEDURE — A9270 NON-COVERED ITEM OR SERVICE: HCPCS | Performed by: HOSPITALIST

## 2019-08-05 PROCEDURE — 99233 SBSQ HOSP IP/OBS HIGH 50: CPT | Performed by: INTERNAL MEDICINE

## 2019-08-05 PROCEDURE — 83735 ASSAY OF MAGNESIUM: CPT

## 2019-08-05 PROCEDURE — A9270 NON-COVERED ITEM OR SERVICE: HCPCS | Performed by: INTERNAL MEDICINE

## 2019-08-05 PROCEDURE — 770022 HCHG ROOM/CARE - ICU (200)

## 2019-08-05 PROCEDURE — 80053 COMPREHEN METABOLIC PANEL: CPT

## 2019-08-05 PROCEDURE — 93971 EXTREMITY STUDY: CPT | Mod: RT

## 2019-08-05 PROCEDURE — 700102 HCHG RX REV CODE 250 W/ 637 OVERRIDE(OP): Performed by: INTERNAL MEDICINE

## 2019-08-05 PROCEDURE — 700102 HCHG RX REV CODE 250 W/ 637 OVERRIDE(OP): Performed by: HOSPITALIST

## 2019-08-05 PROCEDURE — 90935 HEMODIALYSIS ONE EVALUATION: CPT

## 2019-08-05 PROCEDURE — 85025 COMPLETE CBC W/AUTO DIFF WBC: CPT

## 2019-08-05 PROCEDURE — 99232 SBSQ HOSP IP/OBS MODERATE 35: CPT | Performed by: HOSPITALIST

## 2019-08-05 PROCEDURE — 700111 HCHG RX REV CODE 636 W/ 250 OVERRIDE (IP): Performed by: HOSPITALIST

## 2019-08-05 RX ORDER — ADENOSINE 3 MG/ML
INJECTION, SOLUTION INTRAVENOUS
Status: DISCONTINUED
Start: 2019-08-05 | End: 2019-08-05

## 2019-08-05 RX ORDER — HEPARIN SODIUM 1000 [USP'U]/ML
2800 INJECTION, SOLUTION INTRAVENOUS; SUBCUTANEOUS
Status: DISCONTINUED | OUTPATIENT
Start: 2019-08-05 | End: 2019-08-07

## 2019-08-05 RX ADMIN — OMEPRAZOLE 40 MG: 20 CAPSULE, DELAYED RELEASE ORAL at 05:09

## 2019-08-05 RX ADMIN — AMIODARONE HYDROCHLORIDE 200 MG: 200 TABLET ORAL at 05:09

## 2019-08-05 RX ADMIN — UMECLIDINIUM BROMIDE AND VILANTEROL TRIFENATATE 1 PUFF: 62.5; 25 POWDER RESPIRATORY (INHALATION) at 05:09

## 2019-08-05 RX ADMIN — HYDROCORTISONE SODIUM SUCCINATE 50 MG: 100 INJECTION, POWDER, FOR SOLUTION INTRAMUSCULAR; INTRAVENOUS at 05:09

## 2019-08-05 RX ADMIN — Medication 2001 MG: at 18:04

## 2019-08-05 RX ADMIN — MIDODRINE HYDROCHLORIDE 15 MG: 5 TABLET ORAL at 08:34

## 2019-08-05 RX ADMIN — PRAVASTATIN SODIUM 20 MG: 20 TABLET ORAL at 20:45

## 2019-08-05 RX ADMIN — MIDODRINE HYDROCHLORIDE 15 MG: 5 TABLET ORAL at 18:04

## 2019-08-05 RX ADMIN — Medication 2001 MG: at 08:34

## 2019-08-05 RX ADMIN — MIDODRINE HYDROCHLORIDE 15 MG: 5 TABLET ORAL at 14:01

## 2019-08-05 RX ADMIN — FLUTICASONE PROPIONATE 88 MCG: 44 AEROSOL, METERED RESPIRATORY (INHALATION) at 05:09

## 2019-08-05 ASSESSMENT — ENCOUNTER SYMPTOMS
BRUISES/BLEEDS EASILY: 0
FOCAL WEAKNESS: 0
STRIDOR: 0
PALPITATIONS: 0
NECK PAIN: 0
NAUSEA: 0
CHILLS: 0
WEAKNESS: 0
NERVOUS/ANXIOUS: 0
BLURRED VISION: 0
DIARRHEA: 0
SHORTNESS OF BREATH: 0
ABDOMINAL PAIN: 0
VOMITING: 0
HALLUCINATIONS: 0
HEADACHES: 0
DOUBLE VISION: 0
PHOTOPHOBIA: 0
HEMOPTYSIS: 0
LOSS OF CONSCIOUSNESS: 0
COUGH: 0
FEVER: 0
DIZZINESS: 0
BACK PAIN: 0
SORE THROAT: 0
MYALGIAS: 0
COUGH: 1
SEIZURES: 0

## 2019-08-05 ASSESSMENT — LIFESTYLE VARIABLES: SUBSTANCE_ABUSE: 0

## 2019-08-05 NOTE — TELEPHONE ENCOUNTER
Message   Received: Yesterday   Message Contents   SUPA Espinosa R.N.             Please forward to DR Mendoza's RN       Patient has an appointment on 8/7 with DR Mendoza     Would you please order echo complete w/o contrast to evaluate pericardial effusion  on that day prior to his visit     Thanks      Echo ordered    Task hand carried to scheduling

## 2019-08-05 NOTE — HEART FAILURE PROGRAM
Per notes, patient's known HFrEF is not acutely decompensated on this admission. He was volume overloaded and hyperkalemic due to ESRD.    With that said, patient has never been seen at the HF Program so he was going to establish there on 8/7/19. I have asked the hospital schedulers to push this out a couple of weeks. Patient still has a pericardial drain in place and remains in T 631.    Thank you, [place], Cardiovascular Nurse Navigator, RN, CHFN [phone]

## 2019-08-05 NOTE — PROGRESS NOTES
HD treatment today per routine order using L femoral catheter.Soft bp during treatment-hard to obtain reading at times( lower extremity).Net UF removed 1 L.CVC locked with heparin.Report given to primary Rn.

## 2019-08-05 NOTE — PROGRESS NOTES
Vascular    Patient says he feels much better.  Up an out of bed.     VSS afeb    Left arm fistula pulsatile with no outflow right forearm fistula too small, despite angioplasty in May.  I have recommended right arm fistula creation and a TDC untill the upper arm fistula is ready.  I can take down the left arm fistula at a later date.  Discussed with Dr. Sanches.     Patient on the schedule for 5PM tomorrow for TDC and upper arm fistula creation.

## 2019-08-05 NOTE — PROGRESS NOTES
Critical Care Progress Note    Date of admission  7/28/2019    Chief Complaint  79 y.o. male admitted 7/28/2019 with shortness of breath.    Hospital Course    This gentleman was transferred to the ICU with cardiac tamponade from a large pericardial effusion.  He required urgent pericardiocentesis.      Interval Problem Update  Reviewed last 24 hour events:      1010 hours:    Oriented x 4  Pain in right foot  SR 70-80  HD today - 1 L off  BM today  Renal diet  Anuric  Hx of A-flutter  No anticoagulation  Transfer  On RA - feels better      This gentleman tells me he feels better today.  He denies any dyspnea.  He has a dry cough.  He has no hemoptysis.  He has no angina, palpitations or syncope.  He has no abdominal pain, nausea or vomiting.      Review of Systems  Review of Systems   Constitutional: Negative for chills, fever and malaise/fatigue.   HENT: Negative for ear pain and nosebleeds.    Eyes: Negative for blurred vision, double vision and photophobia.   Respiratory: Positive for cough. Negative for hemoptysis, shortness of breath and stridor.    Cardiovascular: Negative for chest pain and palpitations.   Gastrointestinal: Negative for abdominal pain, nausea and vomiting.   Genitourinary: Negative for dysuria, hematuria and urgency.   Musculoskeletal: Negative for myalgias and neck pain.   Skin: Negative for rash.   Neurological: Negative for focal weakness, seizures, weakness and headaches.   Endo/Heme/Allergies: Does not bruise/bleed easily.   Psychiatric/Behavioral: Negative for hallucinations, substance abuse and suicidal ideas. The patient is not nervous/anxious.         Vital Signs for last 24 hours   Temp:  [36 °C (96.8 °F)-36.1 °C (97 °F)] 36.1 °C (97 °F)  Pulse:  [47-80] 73  Resp:  [15-27] 17  SpO2:  [93 %-98 %] 98 %    Hemodynamic parameters for last 24 hours       Respiratory Information for the last 24 hours       Physical Exam   Physical Exam   Constitutional: He is oriented to person, place,  and time. He appears well-developed. No distress.   HENT:   Head: Normocephalic and atraumatic.   Right Ear: External ear normal.   Left Ear: External ear normal.   Nose: Nose normal.   Mouth/Throat: No oropharyngeal exudate.   Eyes: Pupils are equal, round, and reactive to light. Conjunctivae are normal. Right eye exhibits no discharge. Left eye exhibits no discharge.   Neck: Normal range of motion. Neck supple. No tracheal deviation present.   Cardiovascular: Intact distal pulses. Exam reveals no gallop.   Sinus rhythm   Pulmonary/Chest: No stridor. He has no wheezes. He has no rales.   Abdominal: Soft. Bowel sounds are normal. He exhibits no distension. There is no tenderness. There is no rebound.   Musculoskeletal: Normal range of motion. He exhibits no tenderness or deformity.   No clubbing or cyanosis   Neurological: He is alert and oriented to person, place, and time. No cranial nerve deficit. Coordination normal.   No focal weakness   Skin: Skin is warm and dry. No rash noted. He is not diaphoretic. No erythema.       Medications  Current Facility-Administered Medications   Medication Dose Route Frequency Provider Last Rate Last Dose   • hydrocortisone sodium succinate PF (SOLU-CORTEF) 100 MG injection 50 mg  50 mg Intravenous Q8HRS Jey Sanches D.OLise   50 mg at 08/04/19 2248   • diphenhydrAMINE (BENADRYL) injection 25 mg  25 mg Intravenous HS PRN Jeremy M Gonda, M.D.   25 mg at 08/03/19 0001   • umeclidinium-vilanterol (ANORO ELLIPTA) inhaler 1 Puff  1 Puff Inhalation DAILY Deniz Warner Jr., D.O.   1 Puff at 08/04/19 0436    And   • fluticasone (FLOVENT HFA) 44 MCG/ACT inhaler 88 mcg  2 Puff Inhalation DAILY Deniz Warner Jr., D.O.   88 mcg at 08/04/19 0437   • midodrine (PROAMATINE) tablet 15 mg  15 mg Oral TID WITH MEALS Deniz Warner Jr., D.O.   15 mg at 08/04/19 1728   • ALPRAZolam (XANAX) tablet 0.5 mg  0.5 mg Oral HS PRN Deniz Warner Jr., D.O.       • glucose 4 g chewable tablet 16  g  16 g Oral Q15 MIN PRN Jey Sanches D.O.        And   • DEXTROSE 10% BOLUS 250 mL  250 mL Intravenous Q15 MIN PRN Jey Sanches D.O.       • norepinephrine (LEVOPHED) 8 mg in  mL Infusion  0-30 mcg/min Intravenous Continuous Jeremy M Gonda, M.D.   Stopped at 08/04/19 0730   • amiodarone (CORDARONE) tablet 200 mg  200 mg Oral DAILY Jonathan Shetty M.D.   200 mg at 08/04/19 0434   • calcium acetate (PHOS-LO) 667 MG tablet 2,001 mg  2,001 mg Oral TID WITH MEALS Jonathan Shetty M.D.   2,001 mg at 08/04/19 1728   • levalbuterol (XOPENEX) 1.25 MG/3ML nebulizer solution 1.25 mg  1.25 mg Nebulization Q4H PRN (RT) Jonathan Shetty M.D.       • omeprazole (PRILOSEC) capsule 40 mg  40 mg Oral QAM Jonathan Shetty M.D.   40 mg at 08/04/19 0434   • pravastatin (PRAVACHOL) tablet 20 mg  20 mg Oral Nightly Jonathan Shetty M.D.   20 mg at 08/04/19 2044   • acetaminophen (TYLENOL) tablet 650 mg  650 mg Oral Q6HRS PRN Jonathan Shetty M.D.   650 mg at 07/31/19 2207   • Pharmacy Consult Request ...Pain Management Review 1 Each  1 Each Other PHARMACY TO DOSE Jonathan Shetty M.D.        And   • oxyCODONE immediate-release (ROXICODONE) tablet 2.5 mg  2.5 mg Oral Q3HRS PRN Jonathan Shetty M.D.        And   • oxyCODONE immediate-release (ROXICODONE) tablet 5 mg  5 mg Oral Q3HRS PRN Jonathan Shetty M.D.   5 mg at 08/04/19 2248    And   • morphine (pf) 4 mg/ml injection 2 mg  2 mg Intravenous Q3HRS PRN Jonathan Shetty M.D.   2 mg at 07/29/19 2025   • ondansetron (ZOFRAN) syringe/vial injection 4 mg  4 mg Intravenous Q4HRS PRN Jonathan Shetty M.D.       • ondansetron (ZOFRAN ODT) dispertab 4 mg  4 mg Oral Q4HRS PRN Jonathan Shetty M.D.       • senna-docusate (PERICOLACE or SENOKOT S) 8.6-50 MG per tablet 2 Tab  2 Tab Oral BID Jonathan Shetty M.D.   2 Tab at 08/04/19 1728    And   • polyethylene glycol/lytes (MIRALAX) PACKET 1 Packet  1 Packet Oral QDAY PRN Jonathan Shetty M.D.   1 Packet at 08/01/19 1713    And   •  magnesium hydroxide (MILK OF MAGNESIA) suspension 30 mL  30 mL Oral QDAY PRN Jonathan Shetty M.D.        And   • bisacodyl (DULCOLAX) suppository 10 mg  10 mg Rectal QDAY PRN Jonathan Shetty M.D.       • lidocaine (XYLOCAINE) 1 % injection 1 mL  1 mL Other DIALYSIS PRN Long Larson M.D.   1 mL at 08/01/19 1915   • heparin injection 1,500 Units  1,500 Units Intravenous DIALYSIS PRN Long Larson M.D.   1,500 Units at 07/31/19 0755       Fluids    Intake/Output Summary (Last 24 hours) at 8/5/2019 0427  Last data filed at 8/5/2019 0400  Gross per 24 hour   Intake 577 ml   Output 0 ml   Net 577 ml       Laboratory          Recent Labs     08/02/19 0430 08/03/19 0518 08/04/19  0440   SODIUM 135 135 138   POTASSIUM 5.4 5.3 4.8   CHLORIDE 94* 96 98   CO2 20 23 26   * 82* 77*   CREATININE 9.46* 8.13* 7.04*   CALCIUM 9.1 8.8 8.9     Recent Labs     08/02/19 0430 08/03/19 0518 08/04/19  0440   ALTSGPT 497* 340*  --    ASTSGOT 156* 67*  --    ALKPHOSPHAT 148* 137*  --    TBILIRUBIN 1.1 1.0  --    GLUCOSE 73 113* 133*     Recent Labs     08/02/19 0430 08/03/19 0518 08/04/19  0440   WBC 7.1 6.9 8.9   NEUTSPOLYS  --  93.80*  --    LYMPHOCYTES  --  2.00*  --    MONOCYTES  --  3.60  --    EOSINOPHILS  --  0.00  --    BASOPHILS  --  0.00  --    ASTSGOT 156* 67*  --    ALTSGPT 497* 340*  --    ALKPHOSPHAT 148* 137*  --    TBILIRUBIN 1.1 1.0  --      Recent Labs     08/02/19 0430 08/03/19 0518 08/04/19 0440   RBC 2.62* 2.62* 2.82*   HEMOGLOBIN 8.0* 8.1* 8.6*   HEMATOCRIT 26.1* 25.2* 28.4*   PLATELETCT 203 193 176   PROTHROMBTM 23.7* 20.7* 17.1*   INR 2.04* 1.72* 1.36*       Imaging  X-Ray:  I have personally reviewed the images and compared with prior images. and My impression is: Cardiomegaly    Assessment/Plan  * Pericardial tamponade- (present on admission)  Assessment & Plan  S/P emergent pericardiocentesis on 8/1 with resolution of tamponade  Pericardial drain removed on 8/3  Pericardial window if  reaccumulation  Pericardial fluid cultures negative  Query effusion due to ESRD with uremia    Shock (HCC)  Assessment & Plan  Resolved    Elevated transaminase level  Assessment & Plan  Likely due to ischemic hepatopathy  Improving  Avoid hepatotoxins    Delirium  Assessment & Plan  Improved  Limit sedatives and mind altering medications    Paroxysmal atrial fibrillation (HCC)- (present on admission)  Assessment & Plan  Currently in sinus rhythm  Continue amiodarone, 200 mg daily    Chronic anticoagulation- (present on admission)  Assessment & Plan  Coumadin prior to admission  Anticoagulation was reversed with prothrombin complex concentrate and vitamin K as well as FFP  Hold on resuming anticoagulation    Aortic stenosis- (present on admission)  Assessment & Plan  Mean gradient 30 mmHg    Hypotension  Assessment & Plan  Continue midodrine, 15 mg 3 times daily    Essential hypertension- (present on admission)  Assessment & Plan  Currently hypotensive on midodrine    ESRD (end stage renal disease) on dialysis (HCC)- (present on admission)  Assessment & Plan  Continue HD per nephrology    Anemia in CKD (chronic kidney disease)- (present on admission)  Assessment & Plan  Follow hemoglobin    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HCC)  Assessment & Plan  Vascular surgery following  Plan for upper arm fistula creation tomorrow    COPD (chronic obstructive pulmonary disease) (HCC)- (present on admission)  Assessment & Plan  No acute exacerbation  RT protocols  Continue Anoro and Flovent    Hyperkalemia, diminished renal excretion- (present on admission)  Assessment & Plan  Resolved with hemodialysis    Dyslipidemia- (present on admission)  Assessment & Plan  Continue statin    BPH (benign prostatic hypertrophy)- (present on admission)  Assessment & Plan          VTE:  Contraindicated  Ulcer: Not Indicated  Lines: None    I have performed a physical exam and reviewed and updated ROS and Plan today (8/5/2019). In review of yesterday's  note (8/4/2019), there are no changes except as documented above.     Discussed patient condition and risk of morbidity and/or mortality with Hospitalist, Family, RN, RT, Pharmacy, Charge nurse / hot rounds and QA team     Yandel Wilson MD  Pulmonary and Critical Care Medicine

## 2019-08-05 NOTE — CONSULTS
"Reason for PC Consult: Advance Care Planning    Consulted by: Dr. Bella    Assessment:  General:   79 y.o. male with past medical history of systolic congestive heart failure, atrial flutter on anticoagulation with Coumadin, coronary disease, ACS, end-stage renal disease on HD, COPD, hypercholesterolemia, hypertension, PVD who presented 7/28/2019 with shortness of breath and lightheadedness.  He was admitted for volume overload and hyperkalemia and was emergently dialyzed.      Dyspnea: No  Last BM: 08/05/19  Pain: No  Depression: Mood appropriate for situation  Dementia: No    Spiritual:  Is Mormon or spirituality important for coping with this illness? Unable to determine  Has a  or spiritual provider visit been requested?      Palliative Performance Scale: 50%    Advance Directive: None prior, Advance Directive completed- Awaiting notary  DPOA: No- Yaquelin Castelan chosen at this encounter  POLST: No. Completed at this encounter    Code Status: Full then changed to DNR- Intubation ok    Social: Pt lives with his wife Yaquelin. They have two sons that live out of state. Pt is independent in ADLs and drives, runs errands, etc. He goes to dialysis M,W, and F.     Outcome:  Introduced self and role of Palliative Care to pt and his wife Yaquelin.  Assessed pt's understanding of his current medical status, overall health picture, and options for future care. Pt states he had \"water\" on his heart that had to be removed. He explains that his dialysis catheters are not working at this time and the plan is removing the blood clot from one of them. Pt explains that he is very independent, likes running errands, dressing well, and being social. Pt states he does not know the next steps after his dialysis catheter is repaired.     PC RN reviews Advance Directive with pt and his wife Yaquelin. The pt chose options 2,3,5 and elected Yaquelin as his DPOA. Awaiting notarization. Discussed code status including aggressive nature of " "CPR and low success rate. Pt states he would not want CPR. Pt is open to a mechanical ventilator short term, but does not want to be on long term life support. POLST completed with DNR, selective treatment (intubation ok), and artificial nutrition \"as long as I am getting better.\" Dr. Wilson signed POLST.     PC RN begins to identify GOC in regards to pt's current condition. Pt states he is tired and would like to sleep. PC RN offered to follow up at a different time. Pt and Yaquelin agree.     Active listening, reflection, reminiscing, validation & normalization, and empathic support utilized throughout this encounter.  All questions answered.  PC contact information given.         Updated: Dr. Wilson and BS RN Adilia    Plan: DNR, I OK. Get AD notarized. POLST completed, needs AD info added after notary. PC will follow along as clinical picture evolves.     Recommendations: I do not recommend an ethics or hospice consult at this time because GOC to be determined.    Thank you for allowing Palliative Care to participate in this patient's care. Please feel free to call [phone] with any questions or concerns.  "

## 2019-08-05 NOTE — PROGRESS NOTES
[facility] Medicine Daily Progress Note    Date of Service  8/5/2019    Chief Complaint  79 y.o. male admitted 7/28/2019 with weakness and SOB    Hospital Course    Hx ESRD on HD, Systolic CHF, AFib/Flttr, COPD, HLD, HTN, PVD, CAD, AC on coumadin, distant Hx of pericardial effusion.  Presented with above and in ED found to be hyperkalemic, in volume overload and had emergent HD.  CT done on 7/30 for abd pain showing large pericardial effusion.  Came to the ICU on 8/1 with hypotension.  Echo done showing tamponade physiology and went to cath lab for pericardial drain.  1300ml out on placement.  Prior to the procedure he was given KCentra, FFP and Vit K for INR of 3.4.    Post procedure remained hypotensive and on pressors  4      Interval Problem [place]  Pt states he feels good other then pain in his feet.  This is chronic for him and stable.  Worse with pressure or walking better when elevated and with rest  Sinus 70-80s  Levo off over 24hrs  Unable to get BP's  AFebrile  Tolerating po  Anuric      Consultants/Specialty  Cardiology  Thoracic Surgery  Pulmonology    Code Status  full    Disposition  Cont in ICU on pressors    Review of Systems  Review of Systems   Constitutional: Negative for chills and fever.   HENT: Negative for nosebleeds and sore throat.    Eyes: Negative for blurred vision and double vision.   Respiratory: Negative for cough and shortness of breath.    Cardiovascular: Negative for chest pain, palpitations and leg swelling.   Gastrointestinal: Negative for abdominal pain, diarrhea, nausea and vomiting.   Genitourinary: Negative for dysuria and urgency.   Musculoskeletal: Negative for back pain.        B foot pain   Skin: Negative for rash.   Neurological: Negative for dizziness, loss of consciousness and headaches.        Physical Exam  Temp:  [36 °C (96.8 °F)-36.1 °C (97 °F)] 36.1 °C (97 °F)  Pulse:  [47-80] 73  Resp:  [15-27] 17  SpO2:  [93 %-98 %] 98 %    Physical Exam   Constitutional: He is  oriented to person, place, and time. He appears well-developed and well-nourished. No distress.   HENT:   Head: Normocephalic and atraumatic.   Nose: Nose normal.   Mouth/Throat: Oropharynx is clear and moist.   Eyes: Conjunctivae are normal.   Neck: No JVD present.   Cardiovascular: Normal rate. Exam reveals no gallop.   Murmur heard.  Pulmonary/Chest: Effort normal. No stridor. No respiratory distress. He has no wheezes. He has no rales.   Abdominal: Soft. There is no tenderness. There is no rebound and no guarding.   Musculoskeletal: He exhibits no edema.   Fistula L arm, no thrill  Unable to palpate DP/TP   Neurological: He is alert and oriented to person, place, and time.   Skin: Skin is warm and dry. No rash noted. He is not diaphoretic.   Psychiatric: He has a normal mood and affect. His behavior is normal. Judgment and thought content normal.   Nursing note and vitals reviewed.      Fluids    Intake/Output Summary (Last 24 hours) at 8/5/2019 0548  Last data filed at 8/5/2019 0400  Gross per 24 hour   Intake 577 ml   Output 0 ml   Net 577 ml       Laboratory  Recent Labs     08/03/19 0518 08/04/19 0440 08/05/19  0512   WBC 6.9 8.9 8.6   RBC 2.62* 2.82* 2.81*   HEMOGLOBIN 8.1* 8.6* 8.6*   HEMATOCRIT 25.2* 28.4* 28.1*   MCV 96.2 100.7* 100.0*   MCH 30.9 30.5 30.6   MCHC 32.1* 30.3* 30.6*   RDW 53.1* 57.4* 57.7*   PLATELETCT 193 176 161*   MPV 10.6 10.1 10.4     Recent Labs     08/03/19 0518 08/04/19 0440 08/05/19  0512   SODIUM 135 138 135   POTASSIUM 5.3 4.8 5.2   CHLORIDE 96 98 95*   CO2 23 26 25   GLUCOSE 113* 133* 112*   BUN 82* 77* 100*   CREATININE 8.13* 7.04* 8.62*   CALCIUM 8.8 8.9 9.0     Recent Labs     08/03/19 0518 08/04/19 0440   INR 1.72* 1.36*               Imaging  US-HEMODIALYSIS GRAFT DUPLEX COMP UPPER EXTREMITY   Final Result      EC-ECHOCARDIOGRAM COMPLETE W/O CONT   Final Result      EC-ECHOCARDIOGRAM LTD W/O CONT   Final Result      DX-CHEST-LIMITED (1 VIEW)   Final Result      1.   No evidence of pneumothorax following RIGHT neck catheter placement   2.  Persistently enlarged cardiac silhouette   3.  Atherosclerosis      EC-ECHOCARDIOGRAM LTD W/O CONT   Final Result      CT-ABDOMEN-PELVIS W/O   Final Result      No CT evidence of acute inflammatory process in the abdomen or pelvis      Very large pericardial effusion has increased from 2016      Small right pleural effusion has enlarged      Aortic valvular calcifications indicating aortic stenosis. There is also severe coronary disease      Atrophic kidneys with cysts. Hepatic cysts are also seen.      Moderate distal colonic diverticulosis without evidence of diverticulitis      US-HEMODIALYSIS GRAFT DUPLEX COMP UPPER EXTREMITY   Final Result      [id number] VIEW   Final Result      1.  No evidence of bowel obstruction.   2.  Abdominal aortic and mesenteric vascular calcifications.      DX-CHEST-PORTABLE (1 VIEW)   Final Result      1.  Stable cardiomegaly.   2.  No acute abnormality.      CL-PERICARDIOCENTESIS    (Results Pending)   IR-CVC NON TUNNELED > AGE 5    (Results Pending)        Assessment/Plan  * Pericardial tamponade- (present on admission)  Assessment & Plan  S/p drain of 1300ml bloody fluid  ?coumadin vs uremic  May yet need window however not medically stable at this time for surgery    Elevated transaminase level  Assessment & Plan  ?secondary to hepatic congestion  Has been trending down  Cont to follow: repeat lab tomorrow    Delirium  Assessment & Plan  resolved  Avoid sedating medications  mobilize    Shock (HCC)  Assessment & Plan  Pump vs BRITTON vs less likely sepsis or volume  Suspect due to BRITTON:  Midodrine   Cont to titrate pressors (Levophed)  Repeat Echo showing relatively good function    Paroxysmal atrial fibrillation (HCC)- (present on admission)  Assessment & Plan  With  A flutter   AC has been reversed; hold further AC as drain still in place, possible further procedures and bloody effusion  Continue  amiodarone  Do not plan on resuming AC in setting of bloody pericardial effusion      Chronic anticoagulation- (present on admission)  Assessment & Plan  Reversed a  Do not plan on re-starting given bloody pericardial effusion    Uremia- (present on admission)  Assessment & Plan  HD per Nephro    Aortic stenosis- (present on admission)  Assessment & Plan  May be a TAVR candidate at some point if we can resolve his current acute issues    Hypotension  Assessment & Plan  Initially cardiogenic shock due to tamponade however repeat Echo today after drain shows that physiology has resolved however cont's to be hypotensive.  Midodrine 15 TID  DC art line  dificult to get accurate pressures however we can't leave the pt in bed indefinitely due to an art line particularly as he is ASx'c.  DC Levo    Essential hypertension- (present on admission)  Assessment & Plan  Unstable BP with hypotensive episodes , likely due to Pericardial tamponade.   Not an acute issue  Hold antihypertensives.     ESRD (end stage renal disease) on dialysis (HCC)- (present on admission)  Assessment & Plan  Post emergent hemodialysis 7/29/2019 and daily HD with persistent hyperkal 6.4  --> 5.3  --> 5.8  Fistulas may not be effective . Discussed with Dr. Guerra- Nephrology   Low k, renal diet  Lisinopril stopped.   Hold diuretics for now due to low BP  dificult acces as partially thrombosed L AVF, R is maturing.    Now with Fem HD cath      Hyperkalemia, diminished renal excretion- (present on admission)  Assessment & Plan  Post emergent hemodialysis per  Dr. Larson with persistent hyperkalemia despite daily HD.  Possible poorly functional  fistula    Monitor tele   Ace inh stopped  Low k diet.   Discussed with Dr. Guerra.     Anemia in CKD (chronic kidney disease)- (present on admission)  Assessment & Plan  No symptoms of bleeding.   Monitor Hgb  Transfuse if needed for hemoglobin less than 7 gm/dL      Dyslipidemia- (present on admission)  Assessment  & Plan  On statin therapy.  Monitor.     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HCC)  Assessment & Plan  L arm  Dr Spaulding following  Plan thrombectomy this week  Presently have a temp femoral line: dw Vasc Surgery.  Pt is OK to get to bedside chair but no ambulation    COPD (chronic obstructive pulmonary disease) (HCC)- (present on admission)  Assessment & Plan  Without current exacerbation.   Respiratory therapy as needed.   Continue scheduled Flovent, As needed Xopenex with scheduled Ellipta  Rt protocols      BPH (benign prostatic hypertrophy)- (present on admission)  Assessment & Plan  Continue with Flomax.  Monitor for urinary retention symptoms.       VTE prophylaxis: none as pericardial fluid bloody

## 2019-08-06 ENCOUNTER — APPOINTMENT (OUTPATIENT)
Dept: RADIOLOGY | Facility: MEDICAL CENTER | Age: 79
DRG: 628 | End: 2019-08-06
Attending: HOSPITALIST
Payer: MEDICARE

## 2019-08-06 ENCOUNTER — ANESTHESIA (OUTPATIENT)
Dept: SURGERY | Facility: MEDICAL CENTER | Age: 79
DRG: 628 | End: 2019-08-06
Payer: MEDICARE

## 2019-08-06 ENCOUNTER — APPOINTMENT (OUTPATIENT)
Dept: RADIOLOGY | Facility: MEDICAL CENTER | Age: 79
DRG: 628 | End: 2019-08-06
Attending: SURGERY
Payer: MEDICARE

## 2019-08-06 ENCOUNTER — ANESTHESIA EVENT (OUTPATIENT)
Dept: SURGERY | Facility: MEDICAL CENTER | Age: 79
DRG: 628 | End: 2019-08-06
Payer: MEDICARE

## 2019-08-06 LAB
ALBUMIN SERPL BCP-MCNC: 3.1 G/DL (ref 3.2–4.9)
ALBUMIN/GLOB SERPL: 1 G/DL
ALP SERPL-CCNC: 157 U/L (ref 30–99)
ALT SERPL-CCNC: 136 U/L (ref 2–50)
ANION GAP SERPL CALC-SCNC: 13 MMOL/L (ref 0–11.9)
AST SERPL-CCNC: 19 U/L (ref 12–45)
BASOPHILS # BLD AUTO: 0.1 % (ref 0–1.8)
BASOPHILS # BLD: 0.01 K/UL (ref 0–0.12)
BILIRUB SERPL-MCNC: 0.8 MG/DL (ref 0.1–1.5)
BUN SERPL-MCNC: 70 MG/DL (ref 8–22)
CALCIUM SERPL-MCNC: 9 MG/DL (ref 8.5–10.5)
CHLORIDE SERPL-SCNC: 99 MMOL/L (ref 96–112)
CO2 SERPL-SCNC: 26 MMOL/L (ref 20–33)
CREAT SERPL-MCNC: 6.55 MG/DL (ref 0.5–1.4)
EOSINOPHIL # BLD AUTO: 0.02 K/UL (ref 0–0.51)
EOSINOPHIL NFR BLD: 0.2 % (ref 0–6.9)
ERYTHROCYTE [DISTWIDTH] IN BLOOD BY AUTOMATED COUNT: 60.2 FL (ref 35.9–50)
GLOBULIN SER CALC-MCNC: 3 G/DL (ref 1.9–3.5)
GLUCOSE SERPL-MCNC: 89 MG/DL (ref 65–99)
HCT VFR BLD AUTO: 29.5 % (ref 42–52)
HGB BLD-MCNC: 9.1 G/DL (ref 14–18)
IMM GRANULOCYTES # BLD AUTO: 0.07 K/UL (ref 0–0.11)
IMM GRANULOCYTES NFR BLD AUTO: 0.8 % (ref 0–0.9)
LYMPHOCYTES # BLD AUTO: 0.53 K/UL (ref 1–4.8)
LYMPHOCYTES NFR BLD: 5.9 % (ref 22–41)
MAGNESIUM SERPL-MCNC: 1.8 MG/DL (ref 1.5–2.5)
MCH RBC QN AUTO: 31.2 PG (ref 27–33)
MCHC RBC AUTO-ENTMCNC: 30.8 G/DL (ref 33.7–35.3)
MCV RBC AUTO: 101 FL (ref 81.4–97.8)
MONOCYTES # BLD AUTO: 0.67 K/UL (ref 0–0.85)
MONOCYTES NFR BLD AUTO: 7.4 % (ref 0–13.4)
NEUTROPHILS # BLD AUTO: 7.74 K/UL (ref 1.82–7.42)
NEUTROPHILS NFR BLD: 85.6 % (ref 44–72)
NRBC # BLD AUTO: 0 K/UL
NRBC BLD-RTO: 0 /100 WBC
PLATELET # BLD AUTO: 141 K/UL (ref 164–446)
PMV BLD AUTO: 10.1 FL (ref 9–12.9)
POTASSIUM SERPL-SCNC: 4.9 MMOL/L (ref 3.6–5.5)
PROT SERPL-MCNC: 6.1 G/DL (ref 6–8.2)
RBC # BLD AUTO: 2.92 M/UL (ref 4.7–6.1)
SODIUM SERPL-SCNC: 138 MMOL/L (ref 135–145)
WBC # BLD AUTO: 9 K/UL (ref 4.8–10.8)

## 2019-08-06 PROCEDURE — 700102 HCHG RX REV CODE 250 W/ 637 OVERRIDE(OP): Performed by: ANESTHESIOLOGY

## 2019-08-06 PROCEDURE — 160009 HCHG ANES TIME/MIN: Performed by: SURGERY

## 2019-08-06 PROCEDURE — 160036 HCHG PACU - EA ADDL 30 MINS PHASE I: Performed by: SURGERY

## 2019-08-06 PROCEDURE — 85025 COMPLETE CBC W/AUTO DIFF WBC: CPT

## 2019-08-06 PROCEDURE — 700105 HCHG RX REV CODE 258: Performed by: ANESTHESIOLOGY

## 2019-08-06 PROCEDURE — 51798 US URINE CAPACITY MEASURE: CPT

## 2019-08-06 PROCEDURE — 160041 HCHG SURGERY MINUTES - EA ADDL 1 MIN LEVEL 4: Performed by: SURGERY

## 2019-08-06 PROCEDURE — 80053 COMPREHEN METABOLIC PANEL: CPT

## 2019-08-06 PROCEDURE — 700101 HCHG RX REV CODE 250: Performed by: ANESTHESIOLOGY

## 2019-08-06 PROCEDURE — 160002 HCHG RECOVERY MINUTES (STAT): Performed by: SURGERY

## 2019-08-06 PROCEDURE — 99232 SBSQ HOSP IP/OBS MODERATE 35: CPT | Performed by: INTERNAL MEDICINE

## 2019-08-06 PROCEDURE — A9270 NON-COVERED ITEM OR SERVICE: HCPCS | Performed by: HOSPITALIST

## 2019-08-06 PROCEDURE — 0JH63XZ INSERTION OF TUNNELED VASCULAR ACCESS DEVICE INTO CHEST SUBCUTANEOUS TISSUE AND FASCIA, PERCUTANEOUS APPROACH: ICD-10-PCS | Performed by: SURGERY

## 2019-08-06 PROCEDURE — 03LC0ZZ OCCLUSION OF LEFT RADIAL ARTERY, OPEN APPROACH: ICD-10-PCS | Performed by: SURGERY

## 2019-08-06 PROCEDURE — 160029 HCHG SURGERY MINUTES - 1ST 30 MINS LEVEL 4: Performed by: SURGERY

## 2019-08-06 PROCEDURE — A9270 NON-COVERED ITEM OR SERVICE: HCPCS | Performed by: ANESTHESIOLOGY

## 2019-08-06 PROCEDURE — 770022 HCHG ROOM/CARE - ICU (200)

## 2019-08-06 PROCEDURE — 700111 HCHG RX REV CODE 636 W/ 250 OVERRIDE (IP): Performed by: SURGERY

## 2019-08-06 PROCEDURE — 93926 LOWER EXTREMITY STUDY: CPT | Mod: 26 | Performed by: INTERNAL MEDICINE

## 2019-08-06 PROCEDURE — C1750 CATH, HEMODIALYSIS,LONG-TERM: HCPCS | Performed by: SURGERY

## 2019-08-06 PROCEDURE — 93926 LOWER EXTREMITY STUDY: CPT | Mod: RT

## 2019-08-06 PROCEDURE — 03170ZD BYPASS RIGHT BRACHIAL ARTERY TO UPPER ARM VEIN, OPEN APPROACH: ICD-10-PCS | Performed by: SURGERY

## 2019-08-06 PROCEDURE — 99233 SBSQ HOSP IP/OBS HIGH 50: CPT | Performed by: INTERNAL MEDICINE

## 2019-08-06 PROCEDURE — 700102 HCHG RX REV CODE 250 W/ 637 OVERRIDE(OP): Performed by: HOSPITALIST

## 2019-08-06 PROCEDURE — 700102 HCHG RX REV CODE 250 W/ 637 OVERRIDE(OP): Performed by: INTERNAL MEDICINE

## 2019-08-06 PROCEDURE — 501837 HCHG SUTURE CV: Performed by: SURGERY

## 2019-08-06 PROCEDURE — 160048 HCHG OR STATISTICAL LEVEL 1-5: Performed by: SURGERY

## 2019-08-06 PROCEDURE — 700111 HCHG RX REV CODE 636 W/ 250 OVERRIDE (IP): Performed by: ANESTHESIOLOGY

## 2019-08-06 PROCEDURE — 160035 HCHG PACU - 1ST 60 MINS PHASE I: Performed by: SURGERY

## 2019-08-06 PROCEDURE — 501838 HCHG SUTURE GENERAL: Performed by: SURGERY

## 2019-08-06 PROCEDURE — A6402 STERILE GAUZE <= 16 SQ IN: HCPCS | Performed by: SURGERY

## 2019-08-06 PROCEDURE — 99233 SBSQ HOSP IP/OBS HIGH 50: CPT | Performed by: HOSPITALIST

## 2019-08-06 PROCEDURE — 700101 HCHG RX REV CODE 250

## 2019-08-06 PROCEDURE — 83735 ASSAY OF MAGNESIUM: CPT

## 2019-08-06 PROCEDURE — 700101 HCHG RX REV CODE 250: Performed by: SURGERY

## 2019-08-06 PROCEDURE — A9270 NON-COVERED ITEM OR SERVICE: HCPCS | Performed by: INTERNAL MEDICINE

## 2019-08-06 PROCEDURE — 05HY33Z INSERTION OF INFUSION DEVICE INTO UPPER VEIN, PERCUTANEOUS APPROACH: ICD-10-PCS | Performed by: SURGERY

## 2019-08-06 DEVICE — CATHETER PALINDROME 23CM - (5EA/PK): Type: IMPLANTABLE DEVICE | Status: FUNCTIONAL

## 2019-08-06 RX ORDER — SODIUM CHLORIDE 9 MG/ML
INJECTION, SOLUTION INTRAVENOUS CONTINUOUS
Status: DISCONTINUED | OUTPATIENT
Start: 2019-08-06 | End: 2019-08-06 | Stop reason: HOSPADM

## 2019-08-06 RX ORDER — ONDANSETRON 2 MG/ML
4 INJECTION INTRAMUSCULAR; INTRAVENOUS
Status: DISCONTINUED | OUTPATIENT
Start: 2019-08-06 | End: 2019-08-06 | Stop reason: HOSPADM

## 2019-08-06 RX ORDER — SODIUM CHLORIDE, SODIUM LACTATE, POTASSIUM CHLORIDE, CALCIUM CHLORIDE 600; 310; 30; 20 MG/100ML; MG/100ML; MG/100ML; MG/100ML
INJECTION, SOLUTION INTRAVENOUS CONTINUOUS
Status: DISCONTINUED | OUTPATIENT
Start: 2019-08-06 | End: 2019-08-06 | Stop reason: HOSPADM

## 2019-08-06 RX ORDER — HEPARIN SODIUM,PORCINE 1000/ML
VIAL (ML) INJECTION
Status: DISCONTINUED | OUTPATIENT
Start: 2019-08-06 | End: 2019-08-06 | Stop reason: HOSPADM

## 2019-08-06 RX ORDER — HEPARIN SODIUM 1000 [USP'U]/ML
INJECTION, SOLUTION INTRAVENOUS; SUBCUTANEOUS PRN
Status: DISCONTINUED | OUTPATIENT
Start: 2019-08-06 | End: 2019-08-06 | Stop reason: SURG

## 2019-08-06 RX ORDER — METOPROLOL TARTRATE 1 MG/ML
INJECTION, SOLUTION INTRAVENOUS
Status: COMPLETED
Start: 2019-08-06 | End: 2019-08-06

## 2019-08-06 RX ORDER — METOPROLOL TARTRATE 1 MG/ML
1 INJECTION, SOLUTION INTRAVENOUS
Status: DISCONTINUED | OUTPATIENT
Start: 2019-08-06 | End: 2019-08-06 | Stop reason: HOSPADM

## 2019-08-06 RX ORDER — CEFAZOLIN SODIUM 1 G/3ML
INJECTION, POWDER, FOR SOLUTION INTRAMUSCULAR; INTRAVENOUS PRN
Status: DISCONTINUED | OUTPATIENT
Start: 2019-08-06 | End: 2019-08-06 | Stop reason: SURG

## 2019-08-06 RX ORDER — SODIUM CHLORIDE 9 MG/ML
INJECTION, SOLUTION INTRAVENOUS
Status: DISCONTINUED | OUTPATIENT
Start: 2019-08-06 | End: 2019-08-06 | Stop reason: SURG

## 2019-08-06 RX ORDER — BUPIVACAINE HYDROCHLORIDE AND EPINEPHRINE 5; 5 MG/ML; UG/ML
INJECTION, SOLUTION EPIDURAL; INTRACAUDAL; PERINEURAL
Status: DISCONTINUED | OUTPATIENT
Start: 2019-08-06 | End: 2019-08-06 | Stop reason: HOSPADM

## 2019-08-06 RX ADMIN — CEFAZOLIN 2 G: 330 INJECTION, POWDER, FOR SOLUTION INTRAMUSCULAR; INTRAVENOUS at 17:50

## 2019-08-06 RX ADMIN — FENTANYL CITRATE 25 MCG: 50 INJECTION, SOLUTION INTRAMUSCULAR; INTRAVENOUS at 17:50

## 2019-08-06 RX ADMIN — METOPROLOL TARTRATE 1 MG: 5 INJECTION, SOLUTION INTRAVENOUS at 21:20

## 2019-08-06 RX ADMIN — AMIODARONE HYDROCHLORIDE 200 MG: 200 TABLET ORAL at 05:35

## 2019-08-06 RX ADMIN — SODIUM CHLORIDE: 9 INJECTION, SOLUTION INTRAVENOUS at 17:47

## 2019-08-06 RX ADMIN — FLUTICASONE PROPIONATE 88 MCG: 44 AEROSOL, METERED RESPIRATORY (INHALATION) at 05:36

## 2019-08-06 RX ADMIN — OXYCODONE HYDROCHLORIDE 5 MG: 5 TABLET ORAL at 13:06

## 2019-08-06 RX ADMIN — UMECLIDINIUM BROMIDE AND VILANTEROL TRIFENATATE 1 PUFF: 62.5; 25 POWDER RESPIRATORY (INHALATION) at 05:36

## 2019-08-06 RX ADMIN — HEPARIN SODIUM 4000 UNITS: 1000 INJECTION, SOLUTION INTRAVENOUS; SUBCUTANEOUS at 18:39

## 2019-08-06 RX ADMIN — MIDODRINE HYDROCHLORIDE 15 MG: 5 TABLET ORAL at 07:30

## 2019-08-06 RX ADMIN — OMEPRAZOLE 40 MG: 20 CAPSULE, DELAYED RELEASE ORAL at 05:35

## 2019-08-06 RX ADMIN — METOPROLOL TARTRATE 1 MG: 5 INJECTION, SOLUTION INTRAVENOUS at 21:15

## 2019-08-06 RX ADMIN — PROPOFOL 25 MCG/KG/MIN: 10 INJECTION, EMULSION INTRAVENOUS at 17:51

## 2019-08-06 RX ADMIN — HYDROCODONE BITARTRATE AND ACETAMINOPHEN 15 ML: 7.5; 325 SOLUTION ORAL at 20:00

## 2019-08-06 RX ADMIN — MIDAZOLAM HYDROCHLORIDE 1 MG: 1 INJECTION, SOLUTION INTRAMUSCULAR; INTRAVENOUS at 17:50

## 2019-08-06 ASSESSMENT — ENCOUNTER SYMPTOMS
HEADACHES: 0
WEIGHT LOSS: 0
ABDOMINAL PAIN: 0
DIAPHORESIS: 0
FEVER: 0
BRUISES/BLEEDS EASILY: 0
FLANK PAIN: 0
HEARTBURN: 0
SPEECH CHANGE: 0
EYE REDNESS: 0
DIZZINESS: 0
PALPITATIONS: 0
EYE DISCHARGE: 0
DIARRHEA: 0
DOUBLE VISION: 0
MYALGIAS: 1
NERVOUS/ANXIOUS: 0
FOCAL WEAKNESS: 0
MYALGIAS: 0
BACK PAIN: 0
NERVOUS/ANXIOUS: 1
BLURRED VISION: 0
LOSS OF CONSCIOUSNESS: 0
SEIZURES: 0
SINUS PAIN: 0
VOMITING: 0
ORTHOPNEA: 0
HALLUCINATIONS: 0
BLOOD IN STOOL: 0
SENSORY CHANGE: 0
HEMOPTYSIS: 0
SORE THROAT: 0
COUGH: 0
EYE PAIN: 0
CHILLS: 0
SPUTUM PRODUCTION: 0
NAUSEA: 0
SHORTNESS OF BREATH: 0

## 2019-08-06 ASSESSMENT — LIFESTYLE VARIABLES: SUBSTANCE_ABUSE: 0

## 2019-08-06 ASSESSMENT — PAIN SCALES - GENERAL: PAIN_LEVEL: 3

## 2019-08-06 NOTE — PROGRESS NOTES
Nephrology Daily Progress Note    Date of Service  8/6/2019    Chief Complaint  79 y.o. male with ESRD/HD,admitted 7/28/2019 with worsening SOB, hyperkalemia    Interval Problem Update  Patient is doing better today  Plan for vascular procedure this afternoon  Review of Systems  Review of Systems   Constitutional: Negative for chills, fever and malaise/fatigue.   Respiratory: Negative for cough and shortness of breath.    Cardiovascular: Negative for chest pain and leg swelling.   Gastrointestinal: Negative for nausea and vomiting.   Genitourinary: Negative for dysuria, frequency and urgency.        Physical Exam  Temp:  [35.9 °C (96.7 °F)-36.6 °C (97.8 °F)] 35.9 °C (96.7 °F)  Pulse:  [69-94] 85  Resp:  [13-26] 17  SpO2:  [94 %-96 %] 95 %    Physical Exam   Constitutional: He is oriented to person, place, and time. No distress.   HENT:   Mouth/Throat: No oropharyngeal exudate.   Eyes: No scleral icterus.   Cardiovascular: Normal rate and regular rhythm.   No murmur heard.  Pulmonary/Chest: Effort normal and breath sounds normal. No respiratory distress. He has no wheezes.   Musculoskeletal: He exhibits no edema or deformity.   Neurological: He is alert and oriented to person, place, and time.   Skin: Skin is warm. He is not diaphoretic. No erythema.   Psychiatric: He has a normal mood and affect. His behavior is normal.   Nursing note and vitals reviewed.      Fluids    Intake/Output Summary (Last 24 hours) at 8/6/2019 1334  Last data filed at 8/6/2019 0600  Gross per 24 hour   Intake 340 ml   Output --   Net 340 ml       Laboratory  Recent Labs     08/04/19  0440 08/05/19  0512 08/06/19  0540   WBC 8.9 8.6 9.0   RBC 2.82* 2.81* 2.92*   HEMOGLOBIN 8.6* 8.6* 9.1*   HEMATOCRIT 28.4* 28.1* 29.5*   .7* 100.0* 101.0*   MCH 30.5 30.6 31.2   MCHC 30.3* 30.6* 30.8*   RDW 57.4* 57.7* 60.2*   PLATELETCT 176 161* 141*   MPV 10.1 10.4 10.1     Recent Labs     08/04/19  0440 08/05/19  0512 08/06/19  0540   SODIUM 138 135  138   POTASSIUM 4.8 5.2 4.9   CHLORIDE 98 95* 99   CO2 26 25 26   GLUCOSE 133* 112* 89   BUN 77* 100* 70*   CREATININE 7.04* 8.62* 6.55*   CALCIUM 8.9 9.0 9.0     Recent Labs     08/04/19  0440   INR 1.36*     No results for input(s): NTPROBNP in the last 72 hours.        Imaging  US-HEMODIALYSIS ACCESS CREATION DUPLEX UNILAT RIGHT   Final Result      US-HEMODIALYSIS GRAFT DUPLEX COMP UPPER EXTREMITY   Final Result      EC-ECHOCARDIOGRAM COMPLETE W/O CONT   Final Result      EC-ECHOCARDIOGRAM LTD W/O CONT   Final Result      DX-CHEST-LIMITED (1 VIEW)   Final Result      1.  No evidence of pneumothorax following RIGHT neck catheter placement   2.  Persistently enlarged cardiac silhouette   3.  Atherosclerosis      EC-ECHOCARDIOGRAM LTD W/O CONT   Final Result      CT-ABDOMEN-PELVIS W/O   Final Result      No CT evidence of acute inflammatory process in the abdomen or pelvis      Very large pericardial effusion has increased from 2016      Small right pleural effusion has enlarged      Aortic valvular calcifications indicating aortic stenosis. There is also severe coronary disease      Atrophic kidneys with cysts. Hepatic cysts are also seen.      Moderate distal colonic diverticulosis without evidence of diverticulitis      US-HEMODIALYSIS GRAFT DUPLEX COMP UPPER EXTREMITY   Final Result      [id number] VIEW   Final Result      1.  No evidence of bowel obstruction.   2.  Abdominal aortic and mesenteric vascular calcifications.      DX-CHEST-PORTABLE (1 VIEW)   Final Result      1.  Stable cardiomegaly.   2.  No acute abnormality.      CL-PERICARDIOCENTESIS    (Results Pending)   IR-CVC NON TUNNELED > AGE 5    (Results Pending)   US-EXTREMITY ARTERY LOWER UNILAT W/XAVI (COMBO) RIGHT    (Results Pending)         Assessment/Plan  1.ESRD.  2.Hypotensive: Better  3.Electrolytes: Better  4.Anemia: Improved  5.AVF malfunction.  Recs:  no need for HD today   renal diet  Daily labs  Renal dose all meds  Avoid  nephrotoxins  Appreciate Dr. Fran lujan

## 2019-08-06 NOTE — PROGRESS NOTES
Hospital Medicine Daily Progress Note    Date of Service  8/6/2019    Chief Complaint  79 y.o. male admitted 7/28/2019 with weakness and SOB    Hospital Course    Hx ESRD on HD, Systolic CHF, AFib/Flttr, COPD, HLD, HTN, PVD, CAD, AC on coumadin, distant Hx of pericardial effusion.  Presented with above and in ED found to be hyperkalemic, in volume overload and had emergent HD.  CT done on 7/30 for abd pain showing large pericardial effusion.  Came to the ICU on 8/1 with hypotension.  Echo done showing tamponade physiology and went to cath lab for pericardial drain.  1300ml out on placement.  Prior to the procedure he was given KCentra, FFP and Vit K for INR of 3.4.    Post procedure remained hypotensive and on pressors  4      Interval Problem [place]  Patient seen and examined today. ICU Care  Care and plan discussed in IDT/Hot rounds.  Lines and assistive devices reviewed.    Patient tolerating treatment and therapies.  All Data, Medication data reviewed.  Case discussed with nursing as available.  Plan of Care reviewed with patient and notified of changes.  8/6 patient with significant bilateral foot and leg pain, right greater than left, alert and oriented x4, scheduled for fistula creation and temporary hemodialysis catheter placement, wife concerned about right foot pain, arterial Doppler ordered, results discussed with vascular surgery, Dr. Peters, denies fever, no dizziness, no blood pressure readings available    Consultants/Specialty  Cardiology  Thoracic Surgery  Pulmonology  Vascular surgery  Code Status  Full code    Disposition  ICU    Review of Systems  Review of Systems   Constitutional: Negative for chills and fever.   HENT: Negative for nosebleeds and sore throat.    Eyes: Negative for blurred vision and double vision.   Respiratory: Negative for cough and shortness of breath.    Cardiovascular: Negative for chest pain, palpitations and leg swelling.   Gastrointestinal: Negative for abdominal  pain, diarrhea, nausea and vomiting.   Genitourinary: Negative for dysuria and urgency.   Musculoskeletal: Positive for joint pain and myalgias. Negative for back pain.        B foot pain   Skin: Negative for rash.   Neurological: Negative for dizziness, loss of consciousness and headaches.   Psychiatric/Behavioral: The patient is nervous/anxious.         Physical Exam  Temp:  [35.9 °C (96.7 °F)-36.7 °C (98 °F)] 35.9 °C (96.7 °F)  Pulse:  [69-94] 85  Resp:  [13-26] 17  SpO2:  [94 %-96 %] 95 %    Physical Exam   Constitutional: He is oriented to person, place, and time. He appears well-developed and well-nourished. No distress.   HENT:   Head: Normocephalic and atraumatic.   Nose: Nose normal.   Mouth/Throat: Oropharynx is clear and moist.   Eyes: Conjunctivae are normal.   Neck: No JVD present.   Cardiovascular: Normal rate. Exam reveals no gallop.   Murmur heard.  Pulmonary/Chest: Effort normal. No stridor. No respiratory distress. He has no wheezes. He has no rales.   Abdominal: Soft. There is no tenderness. There is no rebound and no guarding.   Musculoskeletal: He exhibits no edema.   Fistula L arm, no thrill  Unable to palpate DP/TP   Neurological: He is alert and oriented to person, place, and time.   Skin: Skin is warm and dry. No rash noted. He is not diaphoretic.   Psychiatric: He has a normal mood and affect. His behavior is normal. Judgment and thought content normal.   Nursing note and vitals reviewed.      Fluids    Intake/Output Summary (Last 24 hours) at 8/6/2019 0800  Last data filed at 8/6/2019 0600  Gross per 24 hour   Intake 590 ml   Output --   Net 590 ml       Laboratory  Recent Labs     08/04/19  0440 08/05/19  0512 08/06/19  0540   WBC 8.9 8.6 9.0   RBC 2.82* 2.81* 2.92*   HEMOGLOBIN 8.6* 8.6* 9.1*   HEMATOCRIT 28.4* 28.1* 29.5*   .7* 100.0* 101.0*   MCH 30.5 30.6 31.2   MCHC 30.3* 30.6* 30.8*   RDW 57.4* 57.7* 60.2*   PLATELETCT 176 161* 141*   MPV 10.1 10.4 10.1     Recent Labs      08/04/19  0440 08/05/19  0512 08/06/19  0540   SODIUM 138 135 138   POTASSIUM 4.8 5.2 4.9   CHLORIDE 98 95* 99   CO2 26 25 26   GLUCOSE 133* 112* 89   BUN 77* 100* 70*   CREATININE 7.04* 8.62* 6.55*   CALCIUM 8.9 9.0 9.0     Recent Labs     08/04/19  0440   INR 1.36*               Imaging  US-HEMODIALYSIS ACCESS CREATION DUPLEX UNILAT RIGHT   Final Result      US-HEMODIALYSIS GRAFT DUPLEX COMP UPPER EXTREMITY   Final Result      EC-ECHOCARDIOGRAM COMPLETE W/O CONT   Final Result      EC-ECHOCARDIOGRAM LTD W/O CONT   Final Result      DX-CHEST-LIMITED (1 VIEW)   Final Result      1.  No evidence of pneumothorax following RIGHT neck catheter placement   2.  Persistently enlarged cardiac silhouette   3.  Atherosclerosis      EC-ECHOCARDIOGRAM LTD W/O CONT   Final Result      CT-ABDOMEN-PELVIS W/O   Final Result      No CT evidence of acute inflammatory process in the abdomen or pelvis      Very large pericardial effusion has increased from 2016      Small right pleural effusion has enlarged      Aortic valvular calcifications indicating aortic stenosis. There is also severe coronary disease      Atrophic kidneys with cysts. Hepatic cysts are also seen.      Moderate distal colonic diverticulosis without evidence of diverticulitis      US-HEMODIALYSIS GRAFT DUPLEX COMP UPPER EXTREMITY   Final Result      [id number] VIEW   Final Result      1.  No evidence of bowel obstruction.   2.  Abdominal aortic and mesenteric vascular calcifications.      DX-CHEST-PORTABLE (1 VIEW)   Final Result      1.  Stable cardiomegaly.   2.  No acute abnormality.      CL-PERICARDIOCENTESIS    (Results Pending)   IR-CVC NON TUNNELED > AGE 5    (Results Pending)        Assessment/Plan  * Pericardial tamponade- (present on admission)  Assessment & Plan  S/p drain of 1300ml bloody fluid  ?coumadin vs uremic  May yet need window however not medically stable at this time for surgery    Elevated transaminase level  Assessment & Plan  ?secondary  to hepatic congestion  Has been trending down  Cont to follow: repeat lab tomorrow    Delirium  Assessment & Plan  resolved  Avoid sedating medications  mobilize    Shock (HCC)  Assessment & Plan  Pump vs BRITTON vs less likely sepsis or volume  Suspect due to BRITTON:  Midodrine   Repeat Echo showing relatively good function    Paroxysmal atrial fibrillation (HCC)- (present on admission)  Assessment & Plan  With  A flutter   AC has been reversed; hold further AC as drain still in place, possible further procedures and bloody effusion  Continue amiodarone  Do not plan on resuming AC in setting of bloody pericardial effusion      Chronic anticoagulation- (present on admission)  Assessment & Plan  Reversed, secondary to pericardial effusion  Do not plan on re-starting given bloody pericardial effusion    Uremia- (present on admission)  Assessment & Plan  HD per Nephro    Aortic stenosis- (present on admission)  Assessment & Plan  May be a TAVR candidate at some point if we can resolve his current acute issues    Hypotension  Assessment & Plan  Initially cardiogenic shock due to tamponade however repeat Echo today after drain shows that physiology has resolved however cont's to be hypotensive.  Midodrine 15 TID  DC art line  dificult to get accurate pressures however we can't leave the pt in bed indefinitely due to an art line particularly as he is ASx'c.  DC Levo    Essential hypertension- (present on admission)  Assessment & Plan  Unstable BP with hypotensive episodes , likely due to Pericardial tamponade.   Not an acute issue  Hold antihypertensives.     ESRD (end stage renal disease) on dialysis ([facility])- (present on admission)  Assessment & Plan  Post emergent hemodialysis 7/29/2019 and daily HD with persistent hyperkal 6.4  --> 5.3  --> 5.8  Fistulas may not be effective . Discussed with Dr. Guerra- Nephrology   Low k, renal diet  Lisinopril stopped.   Hold diuretics for now due to low BP  dificult acces as partially  thrombosed L AVF, R is maturing.    Vascular surgery for revision      Hyperkalemia, diminished renal excretion- (present on admission)  Assessment & Plan  Post emergent hemodialysis   Monitor tele   Ace inh stopped  Low k diet.       PAD (peripheral artery disease) ([facility])  Assessment & Plan  Significant compromise in the lower extremities, likely right greater than left  Arterial Doppler noted  Vascular surgery notified    Anemia in CKD (chronic kidney disease)- (present on admission)  Assessment & Plan  No symptoms of bleeding.   Monitor Hgb  Transfuse if needed for hemoglobin less than 7 gm/dL      Dyslipidemia- (present on admission)  Assessment & Plan  On statin therapy.  Monitor.     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[facility])  Assessment & Plan  L arm  Dr Spaulding following  Plan for thrombectomy  Presently have a temp femoral line: dw Vasc Surgery.  Pt is OK to get to bedside chair but no ambulation    COPD (chronic obstructive pulmonary disease) ([facility])- (present on admission)  Assessment & Plan  Without current exacerbation.   Respiratory therapy as needed.   Continue scheduled Flovent, As needed Xopenex with scheduled Ellipta  Rt protocols      BPH (benign prostatic hypertrophy)- (present on admission)  Assessment & Plan  Continue with Flomax.  Monitor for urinary retention symptoms.  Plan  Await outcome of vascular surgery  Pain control  Monitor electrolytes closely  Continue with antiarrhythmic therapy  Medically complex high risk  See orders  Discussed with intensivist and vascular surgery  VTE prophylaxis: none as pericardial fluid bloody  I have performed a physical exam and reviewed and updated ROS and Plan today . In review of yesterday's note , there are no changes except as documented above.

## 2019-08-06 NOTE — PROGRESS NOTES
[facility] taken down to [facility] with RN and CCT. Patient on transport monitor, HR stable SPO2 98%.

## 2019-08-06 NOTE — PROGRESS NOTES
Progress Note    I saw the pt today as he was heading outside for some air. States he's feeling well. No complaints.    Plan for RUE AVF and TDC today w/ Dr. Peters.  Keep NPO    Jin Spaulding MD  Vascular Surgeon  [facility]  Office: [phone]

## 2019-08-06 NOTE — ASSESSMENT & PLAN NOTE
S/P right upper extremity brachiocephalic fistula creation on 8/6  S/P ligation of left upper extremity radiocephalic fistula  Right IJ tunneled TDC placed on 8/6

## 2019-08-06 NOTE — PROGRESS NOTES
Critical Care Progress Note    Date of admission  7/28/2019    Chief Complaint  79 y.o. male admitted 7/28/2019 with shortness of breath.    Hospital Course    This gentleman was transferred to the ICU with cardiac tamponade from a large pericardial effusion.  He required urgent pericardiocentesis.      Interval Problem Update  Reviewed last 24 hour events:      Oriented x 4  Vasc procedure today  SR  Cont to hold warfarin      He has pain in his feet.  The right foot is worse today.  He has no cough, wheezing, sputum production, hemoptysis or dyspnea.  He has no angina, palpitations or syncope.  He has no nausea or vomiting.  He denies abdominal pain.      Review of Systems  Review of Systems   Constitutional: Negative for diaphoresis and weight loss.   HENT: Negative for ear discharge, sinus pain and tinnitus.    Eyes: Negative for pain, discharge and redness.   Respiratory: Negative for hemoptysis, sputum production and shortness of breath.    Cardiovascular: Negative for chest pain and orthopnea.   Gastrointestinal: Negative for abdominal pain, blood in stool and heartburn.   Genitourinary: Negative for dysuria, flank pain and hematuria.   Musculoskeletal: Negative for back pain and myalgias.   Skin: Negative for itching.   Neurological: Negative for dizziness, sensory change, speech change, focal weakness, seizures and loss of consciousness.   Endo/Heme/Allergies: Does not bruise/bleed easily.   Psychiatric/Behavioral: Negative for hallucinations, substance abuse and suicidal ideas. The patient is not nervous/anxious.         Vital Signs for last 24 hours   Temp:  [36 °C (96.8 °F)-36.7 °C (98 °F)] 36 °C (96.8 °F)  Pulse:  [69-94] 88  Resp:  [13-26] 20  SpO2:  [94 %-100 %] 96 %    Hemodynamic parameters for last 24 hours       Respiratory Information for the last 24 hours       Physical Exam   Physical Exam   Constitutional: He is oriented to person, place, and time. He appears well-developed. No distress.    HENT:   Head: Normocephalic.   Right Ear: External ear normal.   Left Ear: External ear normal.   Mouth/Throat: Oropharynx is clear and moist.   Eyes: Pupils are equal, round, and reactive to light. Right eye exhibits no discharge. Left eye exhibits no discharge. No scleral icterus.   Neck: Neck supple. No JVD present. No tracheal deviation present.   Cardiovascular: Intact distal pulses. Exam reveals no friction rub.   Sinus rhythm   Pulmonary/Chest: Effort normal. No respiratory distress. He has no wheezes. He has no rales.   Abdominal: Soft. Bowel sounds are normal. He exhibits no distension. There is no tenderness. There is no guarding.   Musculoskeletal: Normal range of motion. He exhibits no tenderness.   No clubbing or cyanosis   Neurological: He is alert and oriented to person, place, and time. No cranial nerve deficit. Coordination normal.   No focal weakness   Skin: Skin is warm and dry. He is not diaphoretic. No pallor.       Medications  Current Facility-Administered Medications   Medication Dose Route Frequency Provider Last Rate Last Dose   • heparin injection 2,800 Units  2,800 Units Intracatheter ACUTE DIALYSIS PRN Amena Guerra M.D.       • diphenhydrAMINE (BENADRYL) injection 25 mg  25 mg Intravenous HS PRN Jeremy M Gonda, M.D.   25 mg at 08/03/19 0001   • umeclidinium-vilanterol (ANORO ELLIPTA) inhaler 1 Puff  1 Puff Inhalation DAILY Deniz Warner Jr., D.O.   1 Puff at 08/05/19 0509    And   • fluticasone (FLOVENT HFA) 44 MCG/ACT inhaler 88 mcg  2 Puff Inhalation DAILY Deniz Warner Jr., D.O.   88 mcg at 08/05/19 0509   • midodrine (PROAMATINE) tablet 15 mg  15 mg Oral TID WITH MEALS Deniz Warner Jr., D.O.   15 mg at 08/05/19 1804   • ALPRAZolam (XANAX) tablet 0.5 mg  0.5 mg Oral HS PRN Deniz Warner Jr., D.O.       • glucose 4 g chewable tablet 16 g  16 g Oral Q15 MIN PRN Jey Sanches D.O.        And   • DEXTROSE 10% BOLUS 250 mL  250 mL Intravenous Q15 MIN PRN Jey  TATO Sanches       • norepinephrine (LEVOPHED) 8 mg in  mL Infusion  0-30 mcg/min Intravenous Continuous Jeremy M Gonda, M.D.   Stopped at 08/04/19 0730   • amiodarone (CORDARONE) tablet 200 mg  200 mg Oral DAILY Jonathan Shetty M.D.   200 mg at 08/05/19 0509   • calcium acetate (PHOS-LO) 667 MG tablet 2,001 mg  2,001 mg Oral TID WITH MEALS Jonathan Shetty M.D.   2,001 mg at 08/05/19 1804   • levalbuterol (XOPENEX) 1.25 MG/3ML nebulizer solution 1.25 mg  1.25 mg Nebulization Q4H PRN (RT) Jonathan Shetty M.D.       • omeprazole (PRILOSEC) capsule 40 mg  40 mg Oral QAM Jonathan Shetty M.D.   40 mg at 08/05/19 0509   • pravastatin (PRAVACHOL) tablet 20 mg  20 mg Oral Nightly Jonathan Shetty M.D.   20 mg at 08/05/19 2045   • acetaminophen (TYLENOL) tablet 650 mg  650 mg Oral Q6HRS PRN Jonathan Shetty M.D.   650 mg at 07/31/19 2207   • Pharmacy Consult Request ...Pain Management Review 1 Each  1 Each Other PHARMACY TO DOSE Jonathan Shetty M.D.        And   • oxyCODONE immediate-release (ROXICODONE) tablet 2.5 mg  2.5 mg Oral Q3HRS PRN Jonathan Shetty M.D.        And   • oxyCODONE immediate-release (ROXICODONE) tablet 5 mg  5 mg Oral Q3HRS PRN Jonathan Shetty M.D.   5 mg at 08/04/19 2248    And   • morphine (pf) 4 mg/ml injection 2 mg  2 mg Intravenous Q3HRS PRN Jonathan Shetty M.D.   2 mg at 07/29/19 2025   • ondansetron (ZOFRAN) syringe/vial injection 4 mg  4 mg Intravenous Q4HRS PRN Jonathan Shetty M.D.       • ondansetron (ZOFRAN ODT) dispertab 4 mg  4 mg Oral Q4HRS PRN Jonathan Shetty M.D.       • senna-docusate (PERICOLACE or SENOKOT S) 8.6-50 MG per tablet 2 Tab  2 Tab Oral BID Jonathan Shetty M.D.   Stopped at 08/05/19 1800    And   • polyethylene glycol/lytes (MIRALAX) PACKET 1 Packet  1 Packet Oral QDAY PRN Jonathan Shetty M.D.   1 Packet at 08/01/19 1713    And   • magnesium hydroxide (MILK OF MAGNESIA) suspension 30 mL  30 mL Oral QDAY PRN Jonathan Shetty M.D.        And   • bisacodyl (DULCOLAX)  suppository 10 mg  10 mg Rectal QDAY PRN Jonathan Shetty M.D.       • lidocaine (XYLOCAINE) 1 % injection 1 mL  1 mL Other DIALYSIS PRN Long Larson M.D.   1 mL at 08/01/19 1915   • heparin injection 1,500 Units  1,500 Units Intravenous DIALYSIS PRN Long Larson M.D.   1,500 Units at 07/31/19 0755       Fluids    Intake/Output Summary (Last 24 hours) at 8/6/2019 0436  Last data filed at 8/6/2019 0000  Gross per 24 hour   Intake 780 ml   Output 1000 ml   Net -220 ml       Laboratory          Recent Labs     08/03/19 0518 08/04/19 0440 08/05/19 0512   SODIUM 135 138 135   POTASSIUM 5.3 4.8 5.2   CHLORIDE 96 98 95*   CO2 23 26 25   BUN 82* 77* 100*   CREATININE 8.13* 7.04* 8.62*   MAGNESIUM  --   --  2.0   CALCIUM 8.8 8.9 9.0     Recent Labs     08/03/19 0518 08/04/19 0440 08/05/19 0512   ALTSGPT 340*  --  183*   ASTSGOT 67*  --  19   ALKPHOSPHAT 137*  --  154*   TBILIRUBIN 1.0  --  0.7   GLUCOSE 113* 133* 112*     Recent Labs     08/03/19 0518 08/04/19 0440 08/05/19 0512   WBC 6.9 8.9 8.6   NEUTSPOLYS 93.80*  --  91.30*   LYMPHOCYTES 2.00*  --  3.10*   MONOCYTES 3.60  --  4.90   EOSINOPHILS 0.00  --  0.00   BASOPHILS 0.00  --  0.10   ASTSGOT 67*  --  19   ALTSGPT 340*  --  183*   ALKPHOSPHAT 137*  --  154*   TBILIRUBIN 1.0  --  0.7     Recent Labs     08/03/19 0518 08/04/19 0440 08/05/19 0512   RBC 2.62* 2.82* 2.81*   HEMOGLOBIN 8.1* 8.6* 8.6*   HEMATOCRIT 25.2* 28.4* 28.1*   PLATELETCT 193 176 161*   PROTHROMBTM 20.7* 17.1*  --    INR 1.72* 1.36*  --        Imaging  Ultrasound:  Arterial ultrasound shows evidence of PAD in the right lower extremity.    Assessment/Plan  * Pericardial tamponade- (present on admission)  Assessment & Plan  S/P emergent pericardiocentesis on 8/1 with resolution of tamponade  Pericardial drain removed on 8/3  Consider pericardial window if the effusion re-accumulates  Pericardial fluid cultures remain negative  Suspect effusion due to ESRD with uremia    Elevated  transaminase level  Assessment & Plan  Likely due to ischemic hepatopathy  Improved  Avoid hepatotoxins    Delirium  Assessment & Plan  Resolved  Limit mind altering medications and sedatives    Paroxysmal atrial fibrillation (HCC)- (present on admission)  Assessment & Plan  He is in sinus rhythm  Continue amiodarone, 200 mg daily    Chronic anticoagulation- (present on admission)  Assessment & Plan  Coumadin prior to admission  Anticoagulation was reversed with prothrombin complex concentrate and vitamin K as well as FFP  Hold anticoagulation    Aortic stenosis- (present on admission)  Assessment & Plan  Mean gradient 30 mmHg    Hypotension  Assessment & Plan  Continue midodrine, 15 mg 3 times daily    Essential hypertension- (present on admission)  Assessment & Plan  Hold antihypertensives  He is on midodrine    ESRD (end stage renal disease) on dialysis (HCC)- (present on admission)  Assessment & Plan  HD per nephrology    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HCC)  Assessment & Plan  For a right upper extremity fistula formation today  Vascular surgery will place a tunneled temporary dialysis catheter  The plan is to take down the left upper extremity AV fistula at a future time    COPD (chronic obstructive pulmonary disease) (HCC)- (present on admission)  Assessment & Plan  No acute exacerbation  Continue Flovent and Anoro  RT protocols    Dyslipidemia- (present on admission)  Assessment & Plan  Continue statin    BPH (benign prostatic hypertrophy)- (present on admission)  Assessment & Plan          VTE:  Contraindicated  Ulcer: Not Indicated  Lines: Central Line  Ongoing indication addressed    I have performed a physical exam and reviewed and updated ROS and Plan today (8/6/2019). In review of yesterday's note (8/5/2019), there are no changes except as documented above.     Discussed patient condition and risk of morbidity and/or mortality with Hospitalist, Family, RN, RT, Pharmacy, Charge nurse / hot rounds and QA team      Yandel Wilson MD  Pulmonary and Critical Care Medicine

## 2019-08-06 NOTE — PROGRESS NOTES
Nephrology Daily Progress Note    Date of Service  8/5/2019    Chief Complaint  79 y.o. male with ESRD/HD,admitted 7/28/2019 with worsening SOB, hyperkalemia    Interval Problem Update  Patient is anxious to go home  Review of Systems  Review of Systems   Constitutional: Negative for chills, fever and malaise/fatigue.   Respiratory: Negative for cough and shortness of breath.    Cardiovascular: Negative for chest pain and leg swelling.   Gastrointestinal: Negative for nausea and vomiting.   Genitourinary: Negative for dysuria, frequency and urgency.        Physical Exam  Temp:  [36 °C (96.8 °F)-36.7 °C (98 °F)] 36.7 °C (98 °F)  Pulse:  [67-94] 73  Resp:  [13-26] 14  SpO2:  [93 %-100 %] 95 %    Physical Exam   Constitutional: He is oriented to person, place, and time. No distress.   HENT:   Mouth/Throat: No oropharyngeal exudate.   Eyes: No scleral icterus.   Cardiovascular: Normal rate and regular rhythm.   No murmur heard.  Pulmonary/Chest: Effort normal and breath sounds normal. No respiratory distress. He has no wheezes.   Musculoskeletal: He exhibits no edema or deformity.   Neurological: He is alert and oriented to person, place, and time.   Skin: Skin is warm. He is not diaphoretic. No erythema.   Psychiatric: He has a normal mood and affect. His behavior is normal.   Nursing note and vitals reviewed.      Fluids    Intake/Output Summary (Last 24 hours) at 8/5/2019 1754  Last data filed at 8/5/2019 1200  Gross per 24 hour   Intake 960 ml   Output 1000 ml   Net -40 ml       Laboratory  Recent Labs     08/03/19  0518 08/04/19  0440 08/05/19  0512   WBC 6.9 8.9 8.6   RBC 2.62* 2.82* 2.81*   HEMOGLOBIN 8.1* 8.6* 8.6*   HEMATOCRIT 25.2* 28.4* 28.1*   MCV 96.2 100.7* 100.0*   MCH 30.9 30.5 30.6   MCHC 32.1* 30.3* 30.6*   RDW 53.1* 57.4* 57.7*   PLATELETCT 193 176 161*   MPV 10.6 10.1 10.4     Recent Labs     08/03/19  0518 08/04/19  0440 08/05/19  0512   SODIUM 135 138 135   POTASSIUM 5.3 4.8 5.2   CHLORIDE 96 98  95*   CO2 23 26 25   GLUCOSE 113* 133* 112*   BUN 82* 77* 100*   CREATININE 8.13* 7.04* 8.62*   CALCIUM 8.8 8.9 9.0     Recent Labs     08/03/19  0518 08/04/19  0440   INR 1.72* 1.36*     No results for input(s): NTPROBNP in the last 72 hours.        Imaging  US-HEMODIALYSIS GRAFT DUPLEX COMP UPPER EXTREMITY   Final Result      EC-ECHOCARDIOGRAM COMPLETE W/O CONT   Final Result      EC-ECHOCARDIOGRAM LTD W/O CONT   Final Result      DX-CHEST-LIMITED (1 VIEW)   Final Result      1.  No evidence of pneumothorax following RIGHT neck catheter placement   2.  Persistently enlarged cardiac silhouette   3.  Atherosclerosis      EC-ECHOCARDIOGRAM LTD W/O CONT   Final Result      CT-ABDOMEN-PELVIS W/O   Final Result      No CT evidence of acute inflammatory process in the abdomen or pelvis      Very large pericardial effusion has increased from 2016      Small right pleural effusion has enlarged      Aortic valvular calcifications indicating aortic stenosis. There is also severe coronary disease      Atrophic kidneys with cysts. Hepatic cysts are also seen.      Moderate distal colonic diverticulosis without evidence of diverticulitis      US-HEMODIALYSIS GRAFT DUPLEX COMP UPPER EXTREMITY   Final Result      [id number] VIEW   Final Result      1.  No evidence of bowel obstruction.   2.  Abdominal aortic and mesenteric vascular calcifications.      DX-CHEST-PORTABLE (1 VIEW)   Final Result      1.  Stable cardiomegaly.   2.  No acute abnormality.      CL-PERICARDIOCENTESIS    (Results Pending)   IR-CVC NON TUNNELED > AGE 5    (Results Pending)   US-HEMODIALYSIS ACCESS CREATION DUPLEX UNILAT RIGHT    (Results Pending)         Assessment/Plan  1.ESRD/HD: Had hemodialysis earlier today   2.Hypotensive: Better  3.Electrolytes: Better controlled   4.Anemia: Epogen with dialysis   5.AVF mulfunction : Await vascular surgery final recommendation  Recs:  HD  Renal diet  Daily labs  Renal dose all meds  Avoid nephrotoxins  Prognosis  guarded.  Discussed with

## 2019-08-07 LAB
ALBUMIN SERPL BCP-MCNC: 3.3 G/DL (ref 3.2–4.9)
ALBUMIN/GLOB SERPL: 1.1 G/DL
ALP SERPL-CCNC: 146 U/L (ref 30–99)
ALT SERPL-CCNC: 78 U/L (ref 2–50)
ANION GAP SERPL CALC-SCNC: 18 MMOL/L (ref 0–11.9)
AST SERPL-CCNC: 10 U/L (ref 12–45)
BASOPHILS # BLD AUTO: 0.1 % (ref 0–1.8)
BASOPHILS # BLD: 0.01 K/UL (ref 0–0.12)
BILIRUB SERPL-MCNC: 0.8 MG/DL (ref 0.1–1.5)
BUN SERPL-MCNC: 90 MG/DL (ref 8–22)
CALCIUM SERPL-MCNC: 9.1 MG/DL (ref 8.5–10.5)
CHLORIDE SERPL-SCNC: 97 MMOL/L (ref 96–112)
CO2 SERPL-SCNC: 22 MMOL/L (ref 20–33)
CREAT SERPL-MCNC: 7.83 MG/DL (ref 0.5–1.4)
EOSINOPHIL # BLD AUTO: 0.04 K/UL (ref 0–0.51)
EOSINOPHIL NFR BLD: 0.4 % (ref 0–6.9)
ERYTHROCYTE [DISTWIDTH] IN BLOOD BY AUTOMATED COUNT: 60.6 FL (ref 35.9–50)
GLOBULIN SER CALC-MCNC: 3 G/DL (ref 1.9–3.5)
GLUCOSE SERPL-MCNC: 96 MG/DL (ref 65–99)
HCT VFR BLD AUTO: 29.3 % (ref 42–52)
HGB BLD-MCNC: 9.1 G/DL (ref 14–18)
IMM GRANULOCYTES # BLD AUTO: 0.06 K/UL (ref 0–0.11)
IMM GRANULOCYTES NFR BLD AUTO: 0.6 % (ref 0–0.9)
LYMPHOCYTES # BLD AUTO: 0.48 K/UL (ref 1–4.8)
LYMPHOCYTES NFR BLD: 4.7 % (ref 22–41)
MAGNESIUM SERPL-MCNC: 1.9 MG/DL (ref 1.5–2.5)
MCH RBC QN AUTO: 31.2 PG (ref 27–33)
MCHC RBC AUTO-ENTMCNC: 31.1 G/DL (ref 33.7–35.3)
MCV RBC AUTO: 100.3 FL (ref 81.4–97.8)
MONOCYTES # BLD AUTO: 0.58 K/UL (ref 0–0.85)
MONOCYTES NFR BLD AUTO: 5.7 % (ref 0–13.4)
NEUTROPHILS # BLD AUTO: 9 K/UL (ref 1.82–7.42)
NEUTROPHILS NFR BLD: 88.5 % (ref 44–72)
NRBC # BLD AUTO: 0 K/UL
NRBC BLD-RTO: 0 /100 WBC
PHOSPHATE SERPL-MCNC: 5.3 MG/DL (ref 2.5–4.5)
PLATELET # BLD AUTO: 123 K/UL (ref 164–446)
PMV BLD AUTO: 10.1 FL (ref 9–12.9)
POTASSIUM SERPL-SCNC: 5 MMOL/L (ref 3.6–5.5)
PROT SERPL-MCNC: 6.3 G/DL (ref 6–8.2)
RBC # BLD AUTO: 2.92 M/UL (ref 4.7–6.1)
SODIUM SERPL-SCNC: 137 MMOL/L (ref 135–145)
WBC # BLD AUTO: 10.2 K/UL (ref 4.8–10.8)

## 2019-08-07 PROCEDURE — P9045 ALBUMIN (HUMAN), 5%, 250 ML: HCPCS | Mod: JG | Performed by: INTERNAL MEDICINE

## 2019-08-07 PROCEDURE — 700111 HCHG RX REV CODE 636 W/ 250 OVERRIDE (IP): Performed by: INTERNAL MEDICINE

## 2019-08-07 PROCEDURE — 700102 HCHG RX REV CODE 250 W/ 637 OVERRIDE(OP): Performed by: INTERNAL MEDICINE

## 2019-08-07 PROCEDURE — 99233 SBSQ HOSP IP/OBS HIGH 50: CPT | Performed by: HOSPITALIST

## 2019-08-07 PROCEDURE — 700101 HCHG RX REV CODE 250: Performed by: INTERNAL MEDICINE

## 2019-08-07 PROCEDURE — 99291 CRITICAL CARE FIRST HOUR: CPT | Performed by: INTERNAL MEDICINE

## 2019-08-07 PROCEDURE — 700111 HCHG RX REV CODE 636 W/ 250 OVERRIDE (IP): Performed by: HOSPITALIST

## 2019-08-07 PROCEDURE — 700102 HCHG RX REV CODE 250 W/ 637 OVERRIDE(OP): Performed by: HOSPITALIST

## 2019-08-07 PROCEDURE — 99232 SBSQ HOSP IP/OBS MODERATE 35: CPT | Performed by: INTERNAL MEDICINE

## 2019-08-07 PROCEDURE — 83735 ASSAY OF MAGNESIUM: CPT

## 2019-08-07 PROCEDURE — 84100 ASSAY OF PHOSPHORUS: CPT

## 2019-08-07 PROCEDURE — A9270 NON-COVERED ITEM OR SERVICE: HCPCS | Performed by: HOSPITALIST

## 2019-08-07 PROCEDURE — 700105 HCHG RX REV CODE 258: Performed by: INTERNAL MEDICINE

## 2019-08-07 PROCEDURE — 85025 COMPLETE CBC W/AUTO DIFF WBC: CPT

## 2019-08-07 PROCEDURE — 700111 HCHG RX REV CODE 636 W/ 250 OVERRIDE (IP): Mod: JG | Performed by: INTERNAL MEDICINE

## 2019-08-07 PROCEDURE — 700101 HCHG RX REV CODE 250

## 2019-08-07 PROCEDURE — A9270 NON-COVERED ITEM OR SERVICE: HCPCS | Performed by: INTERNAL MEDICINE

## 2019-08-07 PROCEDURE — 770022 HCHG ROOM/CARE - ICU (200)

## 2019-08-07 PROCEDURE — 90935 HEMODIALYSIS ONE EVALUATION: CPT

## 2019-08-07 PROCEDURE — 80053 COMPREHEN METABOLIC PANEL: CPT

## 2019-08-07 RX ORDER — HEPARIN SODIUM 1000 [USP'U]/ML
3800 INJECTION, SOLUTION INTRAVENOUS; SUBCUTANEOUS
Status: DISCONTINUED | OUTPATIENT
Start: 2019-08-07 | End: 2019-08-11 | Stop reason: HOSPADM

## 2019-08-07 RX ORDER — SODIUM CHLORIDE 9 MG/ML
INJECTION, SOLUTION INTRAVENOUS
Status: ACTIVE
Start: 2019-08-07 | End: 2019-08-07

## 2019-08-07 RX ORDER — NOREPINEPHRINE BITARTRATE 1 MG/ML
INJECTION, SOLUTION INTRAVENOUS
Status: ACTIVE
Start: 2019-08-07 | End: 2019-08-07

## 2019-08-07 RX ORDER — ALBUMIN, HUMAN INJ 5% 5 %
12.5 SOLUTION INTRAVENOUS ONCE
Status: COMPLETED | OUTPATIENT
Start: 2019-08-07 | End: 2019-08-07

## 2019-08-07 RX ORDER — TAMSULOSIN HYDROCHLORIDE 0.4 MG/1
0.4 CAPSULE ORAL
Status: DISCONTINUED | OUTPATIENT
Start: 2019-08-07 | End: 2019-08-11 | Stop reason: HOSPADM

## 2019-08-07 RX ORDER — PREDNISONE 20 MG/1
20 TABLET ORAL DAILY
Status: COMPLETED | OUTPATIENT
Start: 2019-08-07 | End: 2019-08-11

## 2019-08-07 RX ADMIN — OXYCODONE HYDROCHLORIDE 5 MG: 5 TABLET ORAL at 03:53

## 2019-08-07 RX ADMIN — MIDODRINE HYDROCHLORIDE 15 MG: 5 TABLET ORAL at 12:25

## 2019-08-07 RX ADMIN — TAMSULOSIN HYDROCHLORIDE 0.4 MG: 0.4 CAPSULE ORAL at 14:53

## 2019-08-07 RX ADMIN — AMIODARONE HYDROCHLORIDE 200 MG: 200 TABLET ORAL at 06:23

## 2019-08-07 RX ADMIN — OMEPRAZOLE 40 MG: 20 CAPSULE, DELAYED RELEASE ORAL at 06:23

## 2019-08-07 RX ADMIN — METOPROLOL TARTRATE 25 MG: 25 TABLET, FILM COATED ORAL at 14:52

## 2019-08-07 RX ADMIN — ALBUMIN (HUMAN) 12.5 G: 2.5 SOLUTION INTRAVENOUS at 01:41

## 2019-08-07 RX ADMIN — SENNOSIDES, DOCUSATE SODIUM 2 TABLET: 50; 8.6 TABLET, FILM COATED ORAL at 06:23

## 2019-08-07 RX ADMIN — MIDODRINE HYDROCHLORIDE 15 MG: 5 TABLET ORAL at 17:11

## 2019-08-07 RX ADMIN — Medication 2001 MG: at 06:23

## 2019-08-07 RX ADMIN — HEPARIN SODIUM 3800 UNITS: 1000 INJECTION, SOLUTION INTRAVENOUS; SUBCUTANEOUS at 08:33

## 2019-08-07 RX ADMIN — PREDNISONE 20 MG: 20 TABLET ORAL at 14:53

## 2019-08-07 RX ADMIN — Medication 2001 MG: at 12:25

## 2019-08-07 RX ADMIN — SENNOSIDES, DOCUSATE SODIUM 2 TABLET: 50; 8.6 TABLET, FILM COATED ORAL at 17:11

## 2019-08-07 RX ADMIN — NOREPINEPHRINE BITARTRATE 5 MCG/MIN: 1 INJECTION INTRAVENOUS at 06:20

## 2019-08-07 RX ADMIN — UMECLIDINIUM BROMIDE AND VILANTEROL TRIFENATATE 1 PUFF: 62.5; 25 POWDER RESPIRATORY (INHALATION) at 06:28

## 2019-08-07 RX ADMIN — MIDODRINE HYDROCHLORIDE 15 MG: 5 TABLET ORAL at 06:23

## 2019-08-07 RX ADMIN — Medication 2001 MG: at 17:12

## 2019-08-07 RX ADMIN — FLUTICASONE PROPIONATE 88 MCG: 44 AEROSOL, METERED RESPIRATORY (INHALATION) at 06:00

## 2019-08-07 RX ADMIN — PRAVASTATIN SODIUM 20 MG: 20 TABLET ORAL at 21:26

## 2019-08-07 ASSESSMENT — ENCOUNTER SYMPTOMS
SORE THROAT: 0
VOMITING: 0
LOSS OF CONSCIOUSNESS: 0
ABDOMINAL PAIN: 0
FEVER: 0
HEADACHES: 0
BACK PAIN: 0
DIARRHEA: 0
NAUSEA: 0
DOUBLE VISION: 0
DIZZINESS: 0
TREMORS: 0
NECK PAIN: 0
CONSTIPATION: 0
BLURRED VISION: 0
SHORTNESS OF BREATH: 0
MYALGIAS: 0
PHOTOPHOBIA: 0
HALLUCINATIONS: 0
STRIDOR: 0
MYALGIAS: 1
PND: 0
NERVOUS/ANXIOUS: 0
WHEEZING: 0
SEIZURES: 0
CHILLS: 0
FOCAL WEAKNESS: 0
PALPITATIONS: 0
COUGH: 1
NERVOUS/ANXIOUS: 1
COUGH: 0

## 2019-08-07 ASSESSMENT — LIFESTYLE VARIABLES: SUBSTANCE_ABUSE: 0

## 2019-08-07 NOTE — PROGRESS NOTES
[facility] Medicine Daily Progress Note    Date of Service  8/7/2019    Chief Complaint  79 y.o. male admitted 7/28/2019 with weakness and SOB    Hospital Course    Hx ESRD on HD, Systolic CHF, AFib/Flttr, COPD, HLD, HTN, PVD, CAD, AC on coumadin, distant Hx of pericardial effusion.  Presented with above and in ED found to be hyperkalemic, in volume overload and had emergent HD.  CT done on 7/30 for abd pain showing large pericardial effusion.  Came to the ICU on 8/1 with hypotension.  Echo done showing tamponade physiology and went to cath lab for pericardial drain.  1300ml out on placement.  Prior to the procedure he was given KCentra, FFP and Vit K for INR of 3.4.    Post procedure remained hypotensive and on pressors        Interval Problem [place]  Patient seen and examined today. ICU Care  Care and plan discussed in IDT/Hot rounds.  Lines and assistive devices reviewed.    Patient tolerating treatment and therapies.  All Data, Medication data reviewed.  Case discussed with nursing as available.  Plan of Care reviewed with patient and notified of changes.  8/6 patient with significant bilateral foot and leg pain, right greater than left, alert and oriented x4, scheduled for fistula creation and temporary hemodialysis catheter placement, wife concerned about right foot pain, arterial Doppler ordered, results discussed with vascular surgery, Dr. Peters, denies fever, no dizziness, no blood pressure readings available  8/7 the patient feels better, status post right upper arm fistula creation and permacath placement.  The patient is concerned about urinary urge, his heart rate is high, he has not been on his beta-blocker, he does complain of right toe pain question of gout, his blood pressure prior to dialysis and during dialysis was labile and required pressors.  Explained plan of care,  Consultants/Specialty  Cardiology  Thoracic Surgery  Pulmonology  Vascular surgery  Code Status  Full  code    Disposition  ICU    Review of Systems  Review of Systems   Constitutional: Negative for chills and fever.   HENT: Negative for nosebleeds and sore throat.    Eyes: Negative for blurred vision and double vision.   Respiratory: Negative for cough and shortness of breath.    Cardiovascular: Negative for chest pain, palpitations and leg swelling.   Gastrointestinal: Negative for abdominal pain, diarrhea, nausea and vomiting.   Genitourinary: Negative for dysuria and urgency.   Musculoskeletal: Positive for joint pain and myalgias. Negative for back pain.        B foot pain   Skin: Negative for rash.   Neurological: Negative for dizziness, loss of consciousness and headaches.   Psychiatric/Behavioral: The patient is nervous/anxious.         Physical Exam  Temp:  [36.4 °C (97.6 °F)-36.6 °C (97.9 °F)] 36.5 °C (97.7 °F)  Pulse:  [] 121  Resp:  [14-29] 19  BP: ()/(47-69) 89/47  SpO2:  [93 %-100 %] 95 %    Physical Exam   Constitutional: He is oriented to person, place, and time. He appears well-developed and well-nourished. No distress.   HENT:   Head: Normocephalic and atraumatic.   Nose: Nose normal.   Mouth/Throat: Oropharynx is clear and moist.   Eyes: Conjunctivae are normal.   Neck: No JVD present.   Cardiovascular: Normal rate. Exam reveals no gallop.   Murmur heard.  Pulmonary/Chest: Effort normal. No stridor. No respiratory distress. He has no wheezes. He has no rales.   Abdominal: Soft. There is no tenderness. There is no rebound and no guarding.   Musculoskeletal: He exhibits no edema.   Fistula L arm, no thrill  Unable to palpate DP/TP   Neurological: He is alert and oriented to person, place, and time.   Skin: Skin is warm and dry. No rash noted. He is not diaphoretic.   Psychiatric: He has a normal mood and affect. His behavior is normal. Judgment and thought content normal.   Nursing note and vitals reviewed.      Fluids    Intake/Output Summary (Last 24 hours) at 8/7/2019 0807  Last data  filed at 8/7/2019 0600  Gross per 24 hour   Intake 200 ml   Output 50 ml   Net 150 ml       Laboratory  Recent Labs     08/05/19  0512 08/06/19  0540 08/07/19  0400   WBC 8.6 9.0 10.2   RBC 2.81* 2.92* 2.92*   HEMOGLOBIN 8.6* 9.1* 9.1*   HEMATOCRIT 28.1* 29.5* 29.3*   .0* 101.0* 100.3*   MCH 30.6 31.2 31.2   MCHC 30.6* 30.8* 31.1*   RDW 57.7* 60.2* 60.6*   PLATELETCT 161* 141* 123*   MPV 10.4 10.1 10.1     Recent Labs     08/05/19 0512 08/06/19  0540 08/07/19  0400   SODIUM 135 138 137   POTASSIUM 5.2 4.9 5.0   CHLORIDE 95* 99 97   CO2 25 26 22   GLUCOSE 112* 89 96   * 70* 90*   CREATININE 8.62* 6.55* 7.83*   CALCIUM 9.0 9.0 9.1                   Imaging  DX-PORTABLE FLUOROSCOPY < 1 HOUR   Final Result      Single intraoperative image intended for localization and not for diagnostic purposes. CT procedure report for details.   Fluoroscopy Time:  0.03 seconds.  1 fluoroscopic images were obtained.      US-EXTREMITY ARTERY LOWER UNILAT RIGHT   Final Result      US-HEMODIALYSIS ACCESS CREATION DUPLEX UNILAT RIGHT   Final Result      US-HEMODIALYSIS GRAFT DUPLEX COMP UPPER EXTREMITY   Final Result      EC-ECHOCARDIOGRAM COMPLETE W/O CONT   Final Result      EC-ECHOCARDIOGRAM LTD W/O CONT   Final Result      DX-CHEST-LIMITED (1 VIEW)   Final Result      1.  No evidence of pneumothorax following RIGHT neck catheter placement   2.  Persistently enlarged cardiac silhouette   3.  Atherosclerosis      EC-ECHOCARDIOGRAM LTD W/O CONT   Final Result      CT-ABDOMEN-PELVIS W/O   Final Result      No CT evidence of acute inflammatory process in the abdomen or pelvis      Very large pericardial effusion has increased from 2016      Small right pleural effusion has enlarged      Aortic valvular calcifications indicating aortic stenosis. There is also severe coronary disease      Atrophic kidneys with cysts. Hepatic cysts are also seen.      Moderate distal colonic diverticulosis without evidence of diverticulitis       US-HEMODIALYSIS GRAFT DUPLEX COMP UPPER EXTREMITY   Final Result      [id number] VIEW   Final Result      1.  No evidence of bowel obstruction.   2.  Abdominal aortic and mesenteric vascular calcifications.      DX-CHEST-PORTABLE (1 VIEW)   Final Result      1.  Stable cardiomegaly.   2.  No acute abnormality.      CL-PERICARDIOCENTESIS    (Results Pending)   IR-CVC NON TUNNELED > AGE 5    (Results Pending)        Assessment/Plan  * Pericardial tamponade- (present on admission)  Assessment & Plan  S/p drain of 1300ml bloody fluid  ?coumadin vs uremic  May yet need window however not medically stable at this time for surgery    Elevated transaminase level  Assessment & Plan  ?secondary to hepatic congestion  Has been trending down  Cont to follow: repeat lab tomorrow    Delirium  Assessment & Plan  resolved  Avoid sedating medications  mobilize    Shock (HCC)  Assessment & Plan  Pump vs BRITTON vs less likely sepsis or volume  Suspect due to BRITTON:  Midodrine   Repeat Echo showing relatively good function    Paroxysmal atrial fibrillation (HCC)- (present on admission)  Assessment & Plan  With  A flutter   AC has been reversed; hold further AC as drain still in place, possible further procedures and bloody effusion  Continue amiodarone  Do not plan on resuming AC in setting of bloody pericardial effusion      Chronic anticoagulation- (present on admission)  Assessment & Plan  Reversed, secondary to pericardial effusion  Do not plan on re-starting given bloody pericardial effusion    Uremia- (present on admission)  Assessment & Plan  HD per Nephro    Aortic stenosis- (present on admission)  Assessment & Plan  May be a TAVR candidate at some point if we can resolve his current acute issues    Hypotension  Assessment & Plan  Overall improved  Now more accurate blood pressure readings as he can have BP cuff in the left upper extremity  Continue midodrine    Essential hypertension- (present on admission)  Assessment &  Plan  Unstable BP with hypotensive episodes , likely due to Pericardial tamponade.   Not an acute issue  Hold antihypertensives.     ESRD (end stage renal disease) on dialysis ([facility])- (present on admission)  Assessment & Plan  Post emergent hemodialysis 7/29/2019 and daily HD   Low k, renal diet  Lisinopril stopped.   Hold diuretics for now due to low BP        Hyperkalemia, diminished renal excretion- (present on admission)  Assessment & Plan  Post emergent hemodialysis   Monitor tele   Ace inh stopped  Low k diet.       PAD (peripheral artery disease) ([facility])  Assessment & Plan  Significant compromise in the lower extremities, likely right greater than left  Arterial Doppler noted  Vascular surgery notified    Anemia in CKD (chronic kidney disease)- (present on admission)  Assessment & Plan  No symptoms of bleeding.   Monitor Hgb  Transfuse if needed for hemoglobin less than 7 gm/dL      Dyslipidemia- (present on admission)  Assessment & Plan  On statin therapy.  Monitor.     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[facility])  Assessment & Plan  L arm fistula now inactive  Status post creation of right upper extremity fistula  Permacath placement    COPD (chronic obstructive pulmonary disease) ([facility])- (present on admission)  Assessment & Plan  Without current exacerbation.   Respiratory therapy as needed.   Continue scheduled Flovent, As needed Xopenex with scheduled Ellipta  Rt protocols      BPH (benign prostatic hypertrophy)- (present on admission)  Assessment & Plan  Continue with Flomax.  Monitor for urinary retention symptoms.  Plan  Surgical follow-up appreciated  Pain control  Monitor electrolytes closely  Continue with antiarrhythmic therapy, restart beta-blocker  Restart Flomax  Prednisone for gout pain  Medically complex high risk  See orders  Discussed with intensivist and vascular surgery  VTE prophylaxis: none as pericardial fluid bloody  I have performed a physical exam and reviewed and updated ROS and Plan today . In review  of yesterday's note , there are no changes except as documented above.

## 2019-08-07 NOTE — PROGRESS NOTES
notified pt back on unit. Informed pt SBP is 105 and .  Pt had a temporary fem cath removed two hours ago-order from  to limit mobilization for six hours.   Per provider, he will be up on unit to see pt.

## 2019-08-07 NOTE — OR NURSING
Pt A&OX4. SBP low 80's to low 100's in pacu. -120. Received order prior to SBP dropping below 100's for IV Metoprolol - BP drop with minimal effect on HR. No new orders received from anesthesia. Pt on room air. Bruit/thrill + RUE AV fistula, elevated and warm blankets in place. L femoral dialysis cath removed in pacu per , pressure held and dressing in place. Site soft and pt declines pain. No nausea. Pain to R foot/leg main complaint, PO Hycet administered. Pt reports R foot/leg remains sore. Report called to ROCHELLE Fatima. Pt transferred back to [facility] via bed and on monitor by this RN.

## 2019-08-07 NOTE — PROGRESS NOTES
Pt up from OR. Report received from Post OP RN. Assumed care of pt. Pt resting comfortably in bed, t bedside table and  call light within reach. Bed alarm on and in lowest position.VS stable. Updated whiteboard in room and discussed today's POC.  Family (Son) updated. No further questions at this time.

## 2019-08-07 NOTE — OP REPORT
DATE OF SERVICE:  08/06/2019    PREOPERATIVE DIAGNOSIS:  Left arm fistula dysfunction.    POSTPROCEDURE DIAGNOSIS:  Left arm fistula dysfunction.    PROCEDURES:  1. Creation of right brachiocephalic arteriovenous fistula.  2. Ligation of left radiocephalic fistula.  3. Right internal jugular vein tunneled hemodialysis catheter.    SURGEON:  Sera Peters MD    ASSISTANT:  Jin Spaulding MD    ANESTHESIOLOGIST:  Xavier Barron MD    ANESTHESIA:  Monitored anesthesia care with local.    INDICATIONS:  The patient is a 79-year-old gentleman who has been dialyzing   through a left arm fistula.  It has no outflow and, therefore, dialysis has   been ineffective.  I have recommended creation of upper arm fistula, as the   forearm fistula has multiple stenoses.  Risks and benefits were explained and   include bleeding, infection, arteriovenous thrombosis, and catheter   dysfunction.  He understands and agrees to proceed.    DESCRIPTION OF PROCEDURE:  The patient was taken to the operating room and   placed in supine position.  After adequate sedation, the upper chest and   entire right arm were prepped in usual sterile fashion with Chloraseptic prep,   cloth towels and paper drapes. Using a sterile ultrasound probe, the internal   jugular vein was identified and accessed with a sterile technique.  Guidewire   was threaded in its position confirmed with fluoroscopy.  The catheter was   threaded through the subcutaneous tissue after administration of local   anesthesia.  Dilators were passed over the guidewire and finally, dilator and   peel-away sheath were used to place the catheter into the internal jugular   vein.  The insertion site was closed with 4-0 Vicryl and the catheter sutured   in place with 3-0 nylon.  We packed the lumen of the catheter with the   appropriate dose of heparin at 1000 units per mL.  Sterile occlusive dressings   were placed.    I then turned my attention to the right arm.  The upper arm was  instilled with   local anesthesia.  Dissection was taken down to the venous outflow.  I   ligated the median antebrachial vein and took my dissection down to the   brachial artery, which had a low bifurcation.  I controlled the brachial   artery with vessel loops and identified the cephalic outflow.  The cephalic   vein was fashioned, so to create an arteriovenous anastomosis.  The patient   received 4000 units of heparin and this was allowed to circulate.  Proximal   and distal control was obtained and an arteriotomy created with an 11 blade   followed by Mancera scissors.  A 6-0 Prolene suture was used to create the   anastomosis and just prior to closing, I was able to pass a dilator up to 4 mm   with ease.  The anastomosis was closed and flow through the fistula allowed.    The incision was irrigated with copious amounts of saline and closed with   interrupted 3-0 Vicryl followed by 4-0 Monocryl in a subcuticular fashion.    The left arm was then prepped at the wrist with Chloraseptic prep.  Local   anesthesia was instilled over the arteriovenous anastomosis of the prior   fistula.  I ligated the more proximal fistula with silk ligature and closed   the arterial end with 6-0 Prolene suture doing my best to limit the bulge from   the prior stone of the anastomosis.  The wound was irrigated and closed with   interrupted 3-0 Vicryl followed by 4-0 Monocryl in a subcuticular fashion.    Sterile occlusive dressing was placed.    ESTIMATED BLOOD LOSS:  Less than 50 mL from all 3 procedures.         ____________________________________     MD JOAO Llamas / DELANO    DD:  08/06/2019 20:03:47  DT:  08/06/2019 22:29:00    D#:  [id number]  Job#:  [id number]

## 2019-08-07 NOTE — PROGRESS NOTES
POD#1 s/p perm cath, new right upper arm fistula and ligation of left arm fistula    Encounter Vitals  Standard Vitals  Vitals  Blood Pressure : (!) 89/47  Temperature: 36.3 °C (97.4 °F)  Temp src: Temporal  Pulse: (!) 121  Respiration: 19  Pulse Oximetry: 95 %  Weight: 67.4 kg (148 lb 9.4 oz)  Temperature: 36.3 °C (97.4 °F)  Temp src: Temporal  Blood Pressure : (!) 89/47  BP Location: Left, Upper Arm  Patient BP Position: Supine  Pulse: (!) 121  Respiration: 19  Pulse Oximetry: 95 %  O2 Delivery: None (Room Air)  Weight: 67.4 kg (148 lb 9.4 oz)  Weight Source: Bed Scale     Ronny looks pretty good this am.  I ligated the left arm fistula so a left arm cuff pressure could be measured with more accuracy than either leg.  I discussed the severity of his right leg arterial disease with his wife and reassured her that the medical team was focused on the important details.      The right upper arm fistula is patent, with more pulsatility than I would expect.  The forearm fistula also remains patent, but pulsatile, suprisingly.  All dressings ok and Perm Cath worked well for dialysis this am.      Will have to watch the surgical sites, but I expect the right forearm fistula to eventually occlude because I removed the outflow.  If it doesn't close we can consider a compression dressing in the forearm to enhance thrombosis.

## 2019-08-07 NOTE — CARE PLAN
Problem: Infection  Goal: Will remain free from infection  Note:   Will remain free from infection, hand washing done prior to and before pt contact.        Problem: Skin Integrity  Goal: Risk for impaired skin integrity will decrease  Intervention: Implement precautions to protect skin integrity in collaboration with the interdisciplinary team  Flowsheets (Taken 8/6/2019 5182)  Skin Preventative Measures: Pillows in Use for Support / Positioning  Bed Types: Low Air Loss Mattress  Friction Interventions: Draw Sheet / Pad Used for Repositioning  Moisturizers: Barrier Paste  Note:   Precautions in place to promote skin integrity, pillows in use for positioning, routine turning, making sure pt is dry, mepilex in place

## 2019-08-07 NOTE — ANESTHESIA TIME REPORT
Anesthesia Start and Stop Event Times     Date Time Event    8/6/2019 1720 Ready for Procedure     1747 Anesthesia Start     1942 Anesthesia Stop        Responsible Staff  08/06/19    Name Role Begin End    Xavier Barron M.D. Anesth 1747 1942        Preop Diagnosis (Free Text):  Pre-op Diagnosis     renal failure        Preop Diagnosis (Codes):  Diagnosis Information     Diagnosis Code(s): Renal failure [N19]        Post op Diagnosis  End stage renal disease      Premium Reason  A. 3PM - 7AM    Comments:

## 2019-08-07 NOTE — PROGRESS NOTES
Patient transported to Pre-op on transport monitor. VSS. Patient awake and alert for consent. Report given to Meera BYRD

## 2019-08-07 NOTE — OR SURGEON
OP Note    PreOp Diagnosis: Left arm fistula dysfunction    PostOp Diagnosis: Same    Procedure(s):  CREATION, AV FISTULA -  RIGHT ARM BRACHIAL CEPHALIC avf   Ligation of Left Arm Fistula - Wound Class: Clean  INSERTION, CATHETER - PERMA , - Wound Class: Clean    Surgeon(s):  Sera Peters M.D.    Assistant:   Jin Spaulding M.D.    Anesthesiologist/Type of Anesthesia:  Anesthesiologist: Xavier Barron M.D./MAC    Surgical Staff:  Circulator: Benson Johnson R.N.  Relief Circulator: Zainab Burgess R.N.  Scrub Person: Annabella Engle    Specimens removed if any:none    Estimated Blood Loss: <50cc    Findings: Somewhat pulsatile right arm fistula.      Complications: none.    [id number]    8/6/2019 7:48 PM Sera Peters M.D.

## 2019-08-07 NOTE — PROGRESS NOTES
Nephrology Daily Progress Note    Date of Service  8/7/2019    Chief Complaint  79 y.o. male with ESRD/HD,admitted 7/28/2019 with worsening SOB, hyperkalemia    Interval Problem Update  Patient is doing better, had tunneled dialysis catheter placed yesterday  Had hemodialysis earlier today  Review of Systems  Review of Systems   Constitutional: Negative for chills, fever and malaise/fatigue.   Respiratory: Negative for cough and shortness of breath.    Cardiovascular: Negative for chest pain and leg swelling.   Gastrointestinal: Negative for nausea and vomiting.   Genitourinary: Negative for dysuria, frequency and urgency.        Physical Exam  Temp:  [36.3 °C (97.4 °F)-36.6 °C (97.9 °F)] 36.3 °C (97.4 °F)  Pulse:  [] 121  Resp:  [14-27] 19  BP: ()/(47-69) 89/47  SpO2:  [93 %-100 %] 95 %    Physical Exam   Constitutional: He is oriented to person, place, and time. No distress.   HENT:   Mouth/Throat: No oropharyngeal exudate.   Eyes: No scleral icterus.   Cardiovascular: Regular rhythm. Tachycardia present.   No murmur heard.  Pulmonary/Chest: Effort normal and breath sounds normal. No respiratory distress. He has no wheezes.   Musculoskeletal: He exhibits no edema or deformity.   Neurological: He is alert and oriented to person, place, and time.   Skin: Skin is warm. He is not diaphoretic. No erythema.   Psychiatric: He has a normal mood and affect. His behavior is normal.   Nursing note and vitals reviewed.      Fluids    Intake/Output Summary (Last 24 hours) at 8/7/2019 1400  Last data filed at 8/7/2019 0817  Gross per 24 hour   Intake 200 ml   Output 1050 ml   Net -850 ml       Laboratory  Recent Labs     08/05/19  0512 08/06/19  0540 08/07/19  0400   WBC 8.6 9.0 10.2   RBC 2.81* 2.92* 2.92*   HEMOGLOBIN 8.6* 9.1* 9.1*   HEMATOCRIT 28.1* 29.5* 29.3*   .0* 101.0* 100.3*   MCH 30.6 31.2 31.2   MCHC 30.6* 30.8* 31.1*   RDW 57.7* 60.2* 60.6*   PLATELETCT 161* 141* 123*   MPV 10.4 10.1 10.1      Recent Labs     08/05/19  0512 08/06/19  0540 08/07/19  0400   SODIUM 135 138 137   POTASSIUM 5.2 4.9 5.0   CHLORIDE 95* 99 97   CO2 25 26 22   GLUCOSE 112* 89 96   * 70* 90*   CREATININE 8.62* 6.55* 7.83*   CALCIUM 9.0 9.0 9.1         No results for input(s): NTPROBNP in the last 72 hours.        Imaging  DX-PORTABLE FLUOROSCOPY < 1 HOUR   Final Result      Single intraoperative image intended for localization and not for diagnostic purposes. CT procedure report for details.   Fluoroscopy Time:  0.03 seconds.  1 fluoroscopic images were obtained.      US-EXTREMITY ARTERY LOWER UNILAT RIGHT   Final Result      US-HEMODIALYSIS ACCESS CREATION DUPLEX UNILAT RIGHT   Final Result      US-HEMODIALYSIS GRAFT DUPLEX COMP UPPER EXTREMITY   Final Result      EC-ECHOCARDIOGRAM COMPLETE W/O CONT   Final Result      EC-ECHOCARDIOGRAM LTD W/O CONT   Final Result      DX-CHEST-LIMITED (1 VIEW)   Final Result      1.  No evidence of pneumothorax following RIGHT neck catheter placement   2.  Persistently enlarged cardiac silhouette   3.  Atherosclerosis      EC-ECHOCARDIOGRAM LTD W/O CONT   Final Result      CT-ABDOMEN-PELVIS W/O   Final Result      No CT evidence of acute inflammatory process in the abdomen or pelvis      Very large pericardial effusion has increased from 2016      Small right pleural effusion has enlarged      Aortic valvular calcifications indicating aortic stenosis. There is also severe coronary disease      Atrophic kidneys with cysts. Hepatic cysts are also seen.      Moderate distal colonic diverticulosis without evidence of diverticulitis      US-HEMODIALYSIS GRAFT DUPLEX COMP UPPER EXTREMITY   Final Result      DE-ZHMFYYO-1 VIEW   Final Result      1.  No evidence of bowel obstruction.   2.  Abdominal aortic and mesenteric vascular calcifications.      DX-CHEST-PORTABLE (1 VIEW)   Final Result      1.  Stable cardiomegaly.   2.  No acute abnormality.      CL-PERICARDIOCENTESIS    (Results  Pending)   IR-CVC NON TUNNELED > AGE 5    (Results Pending)         Assessment/Plan  1.ESRD.  2.Hypotensive: Still requiring IV pressors  3.Electrolytes: Better  4.Anemia: Improved  5.AVF malfunction.  Recs:  HD MWF  Renal diet  Daily labs  Renal dose all meds  Avoid nephrotoxins  Prognosis guarded.

## 2019-08-07 NOTE — PROGRESS NOTES
Critical Care Progress Note    Date of admission  7/28/2019    Chief Complaint  79 y.o. male admitted 7/28/2019 with shortness of breath.    Hospital Course    This gentleman was transferred to the ICU with cardiac tamponade from a large pericardial effusion.  He required urgent pericardiocentesis.      Interval Problem Update  Reviewed last 24 hour events:      0730 hours:    HD at this time  BP low - titrating NE - now at 10  ST to 130's - switch NE to Nadeem    1020 hours:    ST - 120-130  NE now off  HD completed - 1 L removed\    1700 hours:    BP OK off NE and after HD completed  Uses beta-blocker at home PTA  Start metoprolol 25 BID      Review of Systems  Review of Systems   Constitutional: Negative for chills and fever.   HENT: Negative for ear pain and nosebleeds.    Eyes: Negative for blurred vision, double vision and photophobia.   Respiratory: Positive for cough. Negative for shortness of breath, wheezing and stridor.    Cardiovascular: Negative for chest pain and PND.   Gastrointestinal: Negative for constipation, nausea and vomiting.   Genitourinary: Negative for dysuria and urgency.   Musculoskeletal: Negative for myalgias and neck pain.   Skin: Negative for rash.   Neurological: Negative for tremors, focal weakness, seizures and headaches.   Endo/Heme/Allergies: Negative for environmental allergies.   Psychiatric/Behavioral: Negative for hallucinations, substance abuse and suicidal ideas. The patient is not nervous/anxious.         Vital Signs for last 24 hours   Temp:  [36.3 °C (97.4 °F)-36.6 °C (97.9 °F)] 36.3 °C (97.4 °F)  Pulse:  [114-128] 121  Resp:  [14-27] 19  BP: ()/(47-69) 89/47  SpO2:  [93 %-100 %] 95 %    Hemodynamic parameters for last 24 hours       Respiratory Information for the last 24 hours       Physical Exam   Physical Exam   Constitutional: He is oriented to person, place, and time. He appears well-developed.   HENT:   Head: Normocephalic and atraumatic.   Right Ear: External  ear normal.   Left Ear: External ear normal.   Mouth/Throat: No oropharyngeal exudate.   Eyes: Pupils are equal, round, and reactive to light. Conjunctivae are normal. Right eye exhibits no discharge. Left eye exhibits no discharge.   Neck: Normal range of motion. Neck supple. No tracheal deviation present.   Cardiovascular: Intact distal pulses. Exam reveals no gallop.   Sinus tachycardia   Pulmonary/Chest: No stridor. He has no wheezes. He has no rales.   Abdominal: Soft. Bowel sounds are normal. He exhibits no distension. There is no tenderness. There is no rebound.   Musculoskeletal: Normal range of motion. He exhibits no tenderness.   No clubbing or cyanosis   Neurological: He is alert and oriented to person, place, and time. No cranial nerve deficit. Coordination normal.   No focal weakness   Skin: Skin is warm and dry. No rash noted. He is not diaphoretic.       Medications  Current Facility-Administered Medications   Medication Dose Route Frequency Provider Last Rate Last Dose   • heparin injection 3,800 Units  3,800 Units Intracatheter ACUTE DIALYSIS PRN Fadi Najjar, M.D.   3,800 Units at 08/07/19 0833   • NOREPINEPHRINE BITARTRATE 1 MG/ML IV SOLN        Stopped at 08/07/19 0616   • SODIUM CHLORIDE 0.9 % IV SOLN            • phenylephrine (GINA-SYNEPHRINE) 40 mg in  mL Infusion  0-300 mcg/min Intravenous Continuous Yandel Wilson M.D.       • tamsulosin (FLOMAX) capsule 0.4 mg  0.4 mg Oral AFTER BREAKFAST Wei Serrano M.D.   0.4 mg at 08/07/19 1453   • predniSONE (DELTASONE) tablet 20 mg  20 mg Oral DAILY Wei Serrano M.D.   20 mg at 08/07/19 1453   • metoprolol (LOPRESSOR) tablet 25 mg  25 mg Oral TWICE DAILY Wei Serrano M.D.   25 mg at 08/07/19 1452   • diphenhydrAMINE (BENADRYL) injection 25 mg  25 mg Intravenous HS PRN Jeremy M Gonda, M.D.   25 mg at 08/03/19 0001   • umeclidinium-vilanterol (ANORO ELLIPTA) inhaler 1 Puff  1 Puff Inhalation DAILY Deniz Warner Jr.,  D.O.   1 Puff at 08/07/19 0628    And   • fluticasone (FLOVENT HFA) 44 MCG/ACT inhaler 88 mcg  2 Puff Inhalation DAILY Deniz Warner Jr. D.O.   88 mcg at 08/07/19 0600   • midodrine (PROAMATINE) tablet 15 mg  15 mg Oral TID WITH MEALS Deniz Warner Jr. D.O.   15 mg at 08/07/19 1225   • ALPRAZolam (XANAX) tablet 0.5 mg  0.5 mg Oral HS PRN Deniz Warner Jr. D.O.       • glucose 4 g chewable tablet 16 g  16 g Oral Q15 MIN PRN ANU OlsonOLise        And   • DEXTROSE 10% BOLUS 250 mL  250 mL Intravenous Q15 MIN PRN MEGHAN Olson.O.       • norepinephrine (LEVOPHED) 8 mg in  mL Infusion  0-30 mcg/min Intravenous Continuous Jeremy M Gonda, M.D.   Stopped at 08/07/19 0842   • amiodarone (CORDARONE) tablet 200 mg  200 mg Oral DAILY Jonathan Shetty M.D.   200 mg at 08/07/19 0623   • calcium acetate (PHOS-LO) 667 MG tablet 2,001 mg  2,001 mg Oral TID WITH MEALS Jonathan Shetty M.D.   2,001 mg at 08/07/19 1225   • levalbuterol (XOPENEX) 1.25 MG/3ML nebulizer solution 1.25 mg  1.25 mg Nebulization Q4H PRN (RT) Jonathan Shetty M.D.       • omeprazole (PRILOSEC) capsule 40 mg  40 mg Oral QAM Jonathan Shetty M.D.   40 mg at 08/07/19 0623   • pravastatin (PRAVACHOL) tablet 20 mg  20 mg Oral Nightly Jonathan Shetty M.D.   20 mg at 08/05/19 2045   • acetaminophen (TYLENOL) tablet 650 mg  650 mg Oral Q6HRS PRN Jonathan Shetty M.D.   650 mg at 07/31/19 2207   • Pharmacy Consult Request ...Pain Management Review 1 Each  1 Each Other PHARMACY TO DOSE Jonathan Shetty M.D.        And   • oxyCODONE immediate-release (ROXICODONE) tablet 2.5 mg  2.5 mg Oral Q3HRS PRN Jonathan Shetty M.D.        And   • oxyCODONE immediate-release (ROXICODONE) tablet 5 mg  5 mg Oral Q3HRS PRN Jonathan Shetty M.D.   5 mg at 08/07/19 0353    And   • morphine (pf) 4 mg/ml injection 2 mg  2 mg Intravenous Q3HRS PRN Jonathan Shetty M.D.   2 mg at 07/29/19 2025   • ondansetron (ZOFRAN) syringe/vial injection 4 mg  4 mg  Intravenous Q4HRS PRN Jonathan Shetty M.D.       • ondansetron (ZOFRAN ODT) dispertab 4 mg  4 mg Oral Q4HRS PRN Jonathan Shetty M.D.       • senna-docusate (PERICOLACE or SENOKOT S) 8.6-50 MG per tablet 2 Tab  2 Tab Oral BID Jonathan Shetty M.D.   2 Tab at 08/07/19 0623    And   • polyethylene glycol/lytes (MIRALAX) PACKET 1 Packet  1 Packet Oral QDAY PRN Jonathan Shetty M.D.   1 Packet at 08/01/19 1713    And   • magnesium hydroxide (MILK OF MAGNESIA) suspension 30 mL  30 mL Oral QDAY PRN Jonathan Shetty M.D.        And   • bisacodyl (DULCOLAX) suppository 10 mg  10 mg Rectal QDAY PRN Jonathan Shetty M.D.       • lidocaine (XYLOCAINE) 1 % injection 1 mL  1 mL Other DIALYSIS PRN Long Larson M.D.   1 mL at 08/01/19 1915   • heparin injection 1,500 Units  1,500 Units Intravenous DIALYSIS PRN Long Larson M.D.   1,500 Units at 07/31/19 0755       Fluids    Intake/Output Summary (Last 24 hours) at 8/7/2019 1708  Last data filed at 8/7/2019 0817  Gross per 24 hour   Intake 200 ml   Output 1050 ml   Net -850 ml       Laboratory          Recent Labs     08/05/19  0512 08/06/19  0540 08/07/19  0400   SODIUM 135 138 137   POTASSIUM 5.2 4.9 5.0   CHLORIDE 95* 99 97   CO2 25 26 22   * 70* 90*   CREATININE 8.62* 6.55* 7.83*   MAGNESIUM 2.0 1.8 1.9   PHOSPHORUS  --   --  5.3*   CALCIUM 9.0 9.0 9.1     Recent Labs     08/05/19  0512 08/06/19  0540 08/07/19  0400   ALTSGPT 183* 136* 78*   ASTSGOT 19 19 10*   ALKPHOSPHAT 154* 157* 146*   TBILIRUBIN 0.7 0.8 0.8   GLUCOSE 112* 89 96     Recent Labs     08/05/19  0512 08/06/19  0540 08/07/19  0400   WBC 8.6 9.0 10.2   NEUTSPOLYS 91.30* 85.60* 88.50*   LYMPHOCYTES 3.10* 5.90* 4.70*   MONOCYTES 4.90 7.40 5.70   EOSINOPHILS 0.00 0.20 0.40   BASOPHILS 0.10 0.10 0.10   ASTSGOT 19 19 10*   ALTSGPT 183* 136* 78*   ALKPHOSPHAT 154* 157* 146*   TBILIRUBIN 0.7 0.8 0.8     Recent Labs     08/05/19  0512 08/06/19  0540 08/07/19  0400   RBC 2.81* 2.92* 2.92*   HEMOGLOBIN 8.6*  9.1* 9.1*   HEMATOCRIT 28.1* 29.5* 29.3*   PLATELETCT 161* 141* 123*       Imaging  None    Assessment/Plan  * Pericardial tamponade- (present on admission)  Assessment & Plan  S/P emergent pericardiocentesis on 8/1 with resolution of tamponade  Pericardial drain removed on 8/3  Pericardial fluid cultures are negative and cytology is negative  Suspect due to ESRD with uremia  Consider pericardial window if effusion re-accumulates    Shock ([facility])  Assessment & Plan  Acute undifferentiated shock  I am titrating both norepinephrine and phenylephrine to keep mean arterial pressure greater than 60  Continue midodrine, 15 mg every 8 hours    Elevated transaminase level  Assessment & Plan  Resolving  Due to ischemic hepatopathy    Delirium  Assessment & Plan  Resolved    Paroxysmal atrial fibrillation (HCC)- (present on admission)  Assessment & Plan  Currently in sinus rhythm  Continue amiodarone, 200 mg daily    Chronic anticoagulation- (present on admission)  Assessment & Plan  Coumadin prior to admission  Anticoagulation was reversed with prothrombin complex concentrate and vitamin K as well as FFP  Continue to hold anticoagulation    Aortic stenosis- (present on admission)  Assessment & Plan  Mean valve gradient 30 mmHg    Hypotension  Assessment & Plan  Continue midodrine, 50 mg 3 times daily  I am titrating both norepinephrine and phenylephrine to keep mean arterial pressure greater than 60    Essential hypertension- (present on admission)  Assessment & Plan  He was hypotensive on vasopressor support during HD  BP improved after HD off vasopressor support, but persistent sinus tachycardia  Start metoprolol, 25 mg twice daily with appropriate hold parameters    ESRD (end stage renal disease) on dialysis (HCC)- (present on admission)  Assessment & Plan  Continue hemodialysis    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HCC)  Assessment & Plan  S/P right upper extremity brachiocephalic fistula creation on 8/6  S/P ligation of left upper  extremity radiocephalic fistula  Right IJ tunneled TDC placed on 8/6    COPD (chronic obstructive pulmonary disease) ([facility])- (present on admission)  Assessment & Plan  There is no acute exacerbation  Continue Anoro and Flovent  RT protocols    PAD (peripheral artery disease) ([facility])  Assessment & Plan       Dyslipidemia- (present on admission)  Assessment & Plan  Continue statin    BPH (benign prostatic hypertrophy)- (present on admission)  Assessment & Plan          VTE:  Contraindicated  Ulcer: Not Indicated  Lines: Central Line  Ongoing indication addressed    I have performed a physical exam and reviewed and updated ROS and Plan today (8/7/2019). In review of yesterday's note (8/6/2019), there are no changes except as documented above.     I have assessed and reassessed his blood pressure, hemodynamics and cardiovascular status with titration of both a norepinephrine drip as well as a phenylephrine drip.  He is at high risk for worsening cardiovascular system dysfunction.    Discussed patient condition and risk of morbidity and/or mortality with Hospitalist, Family, RN, RT, Pharmacy, Charge nurse / hot rounds and QA team     The patient remains critically ill.  Critical care time = 32 minutes in directly providing and coordinating critical care and extensive data review.  No time overlap and excludes procedures.     Yandel Wilson MD  Pulmonary and Critical Care Medicine

## 2019-08-07 NOTE — ANESTHESIA POSTPROCEDURE EVALUATION
"      Patient: Parmjit Castelan    Procedure Summary     Date:  08/06/19 Room / Location:  [facility] OR 09 / SURGERY [facility]    Anesthesia Start:  1747 Anesthesia Stop:  1942    Procedures:       CREATION, AV FISTULA - ARM (Right Arm Upper)      INSERTION, CATHETER - PERMA , ANd  Ligation of Left Arm Fistula (Right Chest) Diagnosis:       Renal failure      (renal failure)    Surgeon:  Sera Peters M.D. Responsible Provider:  Xavier Barron M.D.    Anesthesia Type:  MAC ASA Status:  3          Final Anesthesia Type: MAC  Last vitals  BP   Blood Pressure : 108/55, NIBP: (!) 84/67(Per Dialysis RN - patient states he feels \"fine\")    Temp   36.4 °C (97.6 °F)    Pulse   Pulse: (!) 128   Resp   (!) 22    SpO2   97 %      Anesthesia Post Evaluation    Patient location during evaluation: PACU  Patient participation: complete - patient participated  Level of consciousness: awake and alert  Pain score: 3    Airway patency: patent  Anesthetic complications: no  Cardiovascular status: hemodynamically stable  Respiratory status: acceptable  Hydration status: euvolemic    PONV: none                   "

## 2019-08-07 NOTE — ANESTHESIA QCDR
2019 [facility] Clinical Data Registry (for Quality Improvement)     Postoperative nausea/vomiting risk protocol (Adult = 18 yrs and Pediatric 3-17 yrs)- (430 and 463)  General inhalation anesthetic (NOT TIVA) with PONV risk factors: No  Provision of anti-emetic therapy with at least 2 different classes of agents: N/A  Patient DID NOT receive anti-emetic therapy and reason is documented in Medical Record: N/A    Multimodal Pain Management- (AQI59)  Patient undergoing Elective Surgery (i.e. Outpatient, or ASC, or Prescheduled Surgery prior to Hospital Admission): No  Use of Multimodal Pain Management, two or more drugs and/or interventions, NOT including systemic opioids: N/A  Exception: Documented allergy to multiple classes of analgesics: N/A    PACU assessment of acute postoperative pain prior to Anesthesia Care End- Applies to Patients Age = 18- (ABG7)  Initial PACU pain score is which of the following: < 7/10  Patient unable to report pain score: N/A    Post-anesthetic transfer of care checklist/protocol to PACU/ICU- (426 and 427)  Upon conclusion of case, patient transferred to which of the following locations: PACU/Non-ICU  Use of transfer checklist/protocol: Yes  Exclusion: Service Performed in Patient Hospital Room (and thus did not require transfer): N/A    PACU Reintubation- (AQI31)  General anesthesia requiring endotracheal intubation (ETT) along with subsequent extubation in OR or PACU: No  Required reintubation in the PACU: N/A  Extubation was a planned trial documented in the medical record prior to removal of the original airway device: N/A    Unplanned admission to ICU related to anesthesia service up through end of PACU care- (MD51)  Unplanned admission to ICU (not initially anticipated at anesthesia start time): No

## 2019-08-07 NOTE — ANESTHESIA PREPROCEDURE EVALUATION
"Past Medical History:   Diagnosis Date   • Anesthesia     \"needs more sedation\" (can sometimes hear MD during procedure)    • Anticoagulation monitoring, special range    • Aortic stenosis     mod AS- concern for low flow severe AS with rEFof 30%   • Arterial leg ulcer ([facility]) 11/8/2018   • Atrial flutter ([facility]) 6/12/2019   • Basal cell carcinoma of left cheek 3/26/2015   • BPH 7/14/2009   • Bronchitis 12/25/2018   • CAD (coronary artery disease) 2/20/2014   • CATARACT     sanjuanita surgery complete   • CHF (congestive heart failure), NYHA class II, chronic, systolic ([facility]) E F30 in setting of atrial flutter 6/13/2019   • Chronic respiratory failure with hypoxia ([facility]) 5/8/2016   • CKD (chronic kidney disease) stage 4, GFR 15-29 ml/min ([facility]) 1/15/2010    end stage renal disease   • COPD (chronic obstructive pulmonary disease) ([facility])     wears 2.5 L o2 sometimes when he sleeps   • Detached retina    • [facility]   • EMPHYSEMA    • Gout    • Heart burn     occas   • Heart murmur     maria esther lee cardiologist   • High cholesterol    • Hypertension    • Indigestion     occas   • Kidney cyst    • Leg pain, bilateral 8/10/2015   • On supplemental oxygen therapy    • Peripheral vascular disease ([facility]) 8/10/2015   • Pneumonia    • Primary insomnia 3/22/2018   • Proteinuria    • PVD (peripheral vascular disease) ([facility])    • Sleep apnea     O2 PER CANULA  AT NIGHT 2l/m         Relevant Problems   ANESTHESIA   (+) HELGA (obstructive sleep apnea)      PULMONARY   (+) COPD (chronic obstructive pulmonary disease) ([facility])      NEURO   (+) History of basal cell carcinoma (BCC)      CARDIAC   (+) 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[facility])   (+) 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[facility])   (+) AVM (arteriovenous malformation)   (+) Aortic stenosis   (+) Elevated coronary artery calcium score   (+) Essential hypertension   (+) Paroxysmal atrial fibrillation ([facility])         (+) ESRD (end stage renal disease) on dialysis ([facility])   (+) Hyperkalemia, " diminished renal excretion   (+) Renal cyst   (+) Uremia      Other   (+) Uric acid arthropathy       Physical Exam    Airway   Mallampati: II  TM distance: >3 FB  Neck ROM: full       Cardiovascular - normal exam  Rhythm: regular  Rate: normal  (+) murmur     Dental       Very poor dentition   Pulmonary - normal exam  Breath sounds clear to auscultation  (+) decreased breath sounds     Abdominal    Neurological - normal exam                 Anesthesia Plan    ASA 3   ASA physical status 3 criteria: ESRD undergoing regularly scheduled dialysis    Plan - MAC             Induction: intravenous      Pertinent diagnostic labs and testing reviewed    Informed Consent:    Anesthetic plan and risks discussed with patient.    Use of blood products discussed with: patient whom consented to blood products.

## 2019-08-07 NOTE — ASSESSMENT & PLAN NOTE
Significant compromise in the lower extremities, likely right greater than left  Arterial Doppler noted  Vascular surgery notified

## 2019-08-08 PROBLEM — R41.0 DELIRIUM: Status: RESOLVED | Noted: 2019-08-03 | Resolved: 2019-08-08

## 2019-08-08 LAB
ALBUMIN SERPL BCP-MCNC: 3.2 G/DL (ref 3.2–4.9)
ALBUMIN/GLOB SERPL: 1.1 G/DL
ALP SERPL-CCNC: 146 U/L (ref 30–99)
ALT SERPL-CCNC: 30 U/L (ref 2–50)
ANION GAP SERPL CALC-SCNC: 12 MMOL/L (ref 0–11.9)
AST SERPL-CCNC: 10 U/L (ref 12–45)
BASOPHILS # BLD AUTO: 0.3 % (ref 0–1.8)
BASOPHILS # BLD: 0.02 K/UL (ref 0–0.12)
BILIRUB SERPL-MCNC: 0.6 MG/DL (ref 0.1–1.5)
BUN SERPL-MCNC: 64 MG/DL (ref 8–22)
CALCIUM SERPL-MCNC: 9.1 MG/DL (ref 8.5–10.5)
CHLORIDE SERPL-SCNC: 99 MMOL/L (ref 96–112)
CO2 SERPL-SCNC: 25 MMOL/L (ref 20–33)
CREAT SERPL-MCNC: 6.11 MG/DL (ref 0.5–1.4)
EOSINOPHIL # BLD AUTO: 0 K/UL (ref 0–0.51)
EOSINOPHIL NFR BLD: 0 % (ref 0–6.9)
ERYTHROCYTE [DISTWIDTH] IN BLOOD BY AUTOMATED COUNT: 61.4 FL (ref 35.9–50)
GLOBULIN SER CALC-MCNC: 2.8 G/DL (ref 1.9–3.5)
GLUCOSE SERPL-MCNC: 132 MG/DL (ref 65–99)
HCT VFR BLD AUTO: 27.7 % (ref 42–52)
HGB BLD-MCNC: 8.3 G/DL (ref 14–18)
IMM GRANULOCYTES # BLD AUTO: 0.06 K/UL (ref 0–0.11)
IMM GRANULOCYTES NFR BLD AUTO: 0.8 % (ref 0–0.9)
LYMPHOCYTES # BLD AUTO: 0.28 K/UL (ref 1–4.8)
LYMPHOCYTES NFR BLD: 3.6 % (ref 22–41)
MAGNESIUM SERPL-MCNC: 1.9 MG/DL (ref 1.5–2.5)
MCH RBC QN AUTO: 30.4 PG (ref 27–33)
MCHC RBC AUTO-ENTMCNC: 30 G/DL (ref 33.7–35.3)
MCV RBC AUTO: 101.5 FL (ref 81.4–97.8)
MONOCYTES # BLD AUTO: 0.38 K/UL (ref 0–0.85)
MONOCYTES NFR BLD AUTO: 4.8 % (ref 0–13.4)
NEUTROPHILS # BLD AUTO: 7.11 K/UL (ref 1.82–7.42)
NEUTROPHILS NFR BLD: 90.5 % (ref 44–72)
NRBC # BLD AUTO: 0 K/UL
NRBC BLD-RTO: 0 /100 WBC
PLATELET # BLD AUTO: 105 K/UL (ref 164–446)
PMV BLD AUTO: 10.5 FL (ref 9–12.9)
POTASSIUM SERPL-SCNC: 4.5 MMOL/L (ref 3.6–5.5)
PROT SERPL-MCNC: 6 G/DL (ref 6–8.2)
RBC # BLD AUTO: 2.73 M/UL (ref 4.7–6.1)
SODIUM SERPL-SCNC: 136 MMOL/L (ref 135–145)
WBC # BLD AUTO: 7.9 K/UL (ref 4.8–10.8)

## 2019-08-08 PROCEDURE — 99233 SBSQ HOSP IP/OBS HIGH 50: CPT | Performed by: INTERNAL MEDICINE

## 2019-08-08 PROCEDURE — 80053 COMPREHEN METABOLIC PANEL: CPT

## 2019-08-08 PROCEDURE — A9270 NON-COVERED ITEM OR SERVICE: HCPCS | Performed by: INTERNAL MEDICINE

## 2019-08-08 PROCEDURE — 99233 SBSQ HOSP IP/OBS HIGH 50: CPT | Performed by: HOSPITALIST

## 2019-08-08 PROCEDURE — 85025 COMPLETE CBC W/AUTO DIFF WBC: CPT

## 2019-08-08 PROCEDURE — 700102 HCHG RX REV CODE 250 W/ 637 OVERRIDE(OP): Performed by: HOSPITALIST

## 2019-08-08 PROCEDURE — 770022 HCHG ROOM/CARE - ICU (200)

## 2019-08-08 PROCEDURE — 700111 HCHG RX REV CODE 636 W/ 250 OVERRIDE (IP): Performed by: HOSPITALIST

## 2019-08-08 PROCEDURE — A9270 NON-COVERED ITEM OR SERVICE: HCPCS | Performed by: HOSPITALIST

## 2019-08-08 PROCEDURE — 99232 SBSQ HOSP IP/OBS MODERATE 35: CPT | Performed by: INTERNAL MEDICINE

## 2019-08-08 PROCEDURE — 700102 HCHG RX REV CODE 250 W/ 637 OVERRIDE(OP): Performed by: INTERNAL MEDICINE

## 2019-08-08 PROCEDURE — 83735 ASSAY OF MAGNESIUM: CPT

## 2019-08-08 RX ORDER — METOPROLOL TARTRATE 50 MG/1
50 TABLET, FILM COATED ORAL TWICE DAILY
Status: DISCONTINUED | OUTPATIENT
Start: 2019-08-08 | End: 2019-08-11 | Stop reason: HOSPADM

## 2019-08-08 RX ADMIN — UMECLIDINIUM BROMIDE AND VILANTEROL TRIFENATATE 1 PUFF: 62.5; 25 POWDER RESPIRATORY (INHALATION) at 06:05

## 2019-08-08 RX ADMIN — METOPROLOL TARTRATE 50 MG: 50 TABLET ORAL at 17:33

## 2019-08-08 RX ADMIN — AMIODARONE HYDROCHLORIDE 200 MG: 200 TABLET ORAL at 06:08

## 2019-08-08 RX ADMIN — METOPROLOL TARTRATE 25 MG: 25 TABLET, FILM COATED ORAL at 06:07

## 2019-08-08 RX ADMIN — SENNOSIDES, DOCUSATE SODIUM 2 TABLET: 50; 8.6 TABLET, FILM COATED ORAL at 06:07

## 2019-08-08 RX ADMIN — MIDODRINE HYDROCHLORIDE 15 MG: 5 TABLET ORAL at 12:37

## 2019-08-08 RX ADMIN — PREDNISONE 20 MG: 20 TABLET ORAL at 06:07

## 2019-08-08 RX ADMIN — Medication 2001 MG: at 12:37

## 2019-08-08 RX ADMIN — MIDODRINE HYDROCHLORIDE 15 MG: 5 TABLET ORAL at 06:08

## 2019-08-08 RX ADMIN — MIDODRINE HYDROCHLORIDE 15 MG: 5 TABLET ORAL at 17:32

## 2019-08-08 RX ADMIN — Medication 2001 MG: at 06:13

## 2019-08-08 RX ADMIN — TAMSULOSIN HYDROCHLORIDE 0.4 MG: 0.4 CAPSULE ORAL at 08:40

## 2019-08-08 RX ADMIN — FLUTICASONE PROPIONATE 88 MCG: 44 AEROSOL, METERED RESPIRATORY (INHALATION) at 06:00

## 2019-08-08 RX ADMIN — Medication 2001 MG: at 17:31

## 2019-08-08 RX ADMIN — PRAVASTATIN SODIUM 20 MG: 20 TABLET ORAL at 20:20

## 2019-08-08 RX ADMIN — OMEPRAZOLE 40 MG: 20 CAPSULE, DELAYED RELEASE ORAL at 06:07

## 2019-08-08 ASSESSMENT — ENCOUNTER SYMPTOMS
SPEECH CHANGE: 0
SORE THROAT: 0
CLAUDICATION: 0
DIZZINESS: 0
WEIGHT LOSS: 0
FEVER: 0
ABDOMINAL PAIN: 0
MYALGIAS: 1
HEARTBURN: 0
VOMITING: 0
NERVOUS/ANXIOUS: 1
ORTHOPNEA: 0
NAUSEA: 0
SPUTUM PRODUCTION: 0
SENSORY CHANGE: 0
PALPITATIONS: 0
PHOTOPHOBIA: 0
FLANK PAIN: 0
SHORTNESS OF BREATH: 0
MYALGIAS: 0
HALLUCINATIONS: 0
EYE DISCHARGE: 0
HEMOPTYSIS: 0
DOUBLE VISION: 0
LOSS OF CONSCIOUSNESS: 0
BLURRED VISION: 0
EYE PAIN: 0
CHILLS: 0
BACK PAIN: 0
DIAPHORESIS: 0
HEADACHES: 0
DIARRHEA: 0
COUGH: 0
NERVOUS/ANXIOUS: 0

## 2019-08-08 ASSESSMENT — LIFESTYLE VARIABLES: SUBSTANCE_ABUSE: 0

## 2019-08-08 NOTE — CARE PLAN
Problem: Safety  Goal: Will remain free from falls  Outcome: PROGRESSING AS EXPECTED  Intervention: Implement fall precautions  Flowsheets (Taken 8/7/2019 1919)  Bed Alarm: Yes - Alarm On  Environmental Precautions: Treaded Slipper Socks on Patient; Personal Belongings, Wastebasket, Call Bell etc. in Easy Reach; Transferred to Stronger Side; Communication Sign for Patients & Families; Bed in Low Position; Mobility Assessed & Appropriate Sign Placed  Chair/Bed Strip Alarm: Yes - Alarm On  Note:   Fall precautions in place. Non slip foot wear, bed in lowest position, three side rails up, clutter free environment, bed alarm on, and enforced the use of call light.      Problem: Safety  Goal: Will remain free from falls  Outcome: PROGRESSING AS EXPECTED  Intervention: Implement fall precautions  Flowsheets (Taken 8/7/2019 1919)  Bed Alarm: Yes - Alarm On  Environmental Precautions: Treaded Slipper Socks on Patient; Personal Belongings, Wastebasket, Call Bell etc. in Easy Reach; Transferred to Stronger Side; Communication Sign for Patients & Families; Bed in Low Position; Mobility Assessed & Appropriate Sign Placed  Chair/Bed Strip Alarm: Yes - Alarm On  Note:   Fall precautions in place. Non slip foot wear, bed in lowest position, three side rails up, clutter free environment, bed alarm on, and enforced the use of call light.      Problem: Safety  Goal: Will remain free from falls  Intervention: Implement fall precautions  Flowsheets (Taken 8/7/2019 1919)  Bed Alarm: Yes - Alarm On  Environmental Precautions: Treaded Slipper Socks on Patient; Personal Belongings, Wastebasket, Call Bell etc. in Easy Reach; Transferred to Stronger Side; Communication Sign for Patients & Families; Bed in Low Position; Mobility Assessed & Appropriate Sign Placed  Chair/Bed Strip Alarm: Yes - Alarm On  Note:   Fall precautions in place. Non slip foot wear, bed in lowest position, three side rails up, clutter free environment, bed alarm on,  and enforced the use of call light.

## 2019-08-08 NOTE — PROGRESS NOTES
Dr. Peters contacted regarding permission to start PIV on left forearm. Per Dr. Peters, we are permitted to do so, so long as we do not access the former fistula.

## 2019-08-08 NOTE — PROGRESS NOTES
[facility] Medicine Daily Progress Note    Date of Service  8/8/2019    Chief Complaint  79 y.o. male admitted 7/28/2019 with weakness and SOB    Hospital Course    Hx ESRD on HD, Systolic CHF, AFib/Flttr, COPD, HLD, HTN, PVD, CAD, AC on coumadin, distant Hx of pericardial effusion.  Presented with above and in ED found to be hyperkalemic, in volume overload and had emergent HD.  CT done on 7/30 for abd pain showing large pericardial effusion.  Came to the ICU on 8/1 with hypotension.  Echo done showing tamponade physiology and went to cath lab for pericardial drain.  1300ml out on placement.  Prior to the procedure he was given KCentra, FFP and Vit K for INR of 3.4.    Post procedure remained hypotensive and on pressors        Interval Problem [place]  Patient seen and examined today. ICU Care  Care and plan discussed in IDT/Hot rounds.  Lines and assistive devices reviewed.    Patient tolerating treatment and therapies.  All Data, Medication data reviewed.  Case discussed with nursing as available.  Plan of Care reviewed with patient and notified of changes.  8/6 patient with significant bilateral foot and leg pain, right greater than left, alert and oriented x4, scheduled for fistula creation and temporary hemodialysis catheter placement, wife concerned about right foot pain, arterial Doppler ordered, results discussed with vascular surgery, Dr. Peters, denies fever, no dizziness, no blood pressure readings available  8/7 the patient feels better, status post right upper arm fistula creation and permacath placement.  The patient is concerned about urinary urge, his heart rate is high, he has not been on his beta-blocker, he does complain of right toe pain question of gout, his blood pressure prior to dialysis and during dialysis was labile and required pressors.  Explained plan of care  8/8 the patient feels better, continues to be tachycardic, a flutter, A. fib into sinus rhythm and improved heart rate on  beta-blocker, tolerated hemodialysis yesterday well, his urinary complaint is improved, the patient requests a cardiology follow-up and evaluation for possible ablation that was planned.  Wife is concerned about his high heart rate  Consultants/Specialty  Cardiology  Thoracic Surgery  Pulmonology  Vascular surgery  Code Status  Full code    Disposition  ICU    Review of Systems  Review of Systems   Constitutional: Negative for chills and fever.   HENT: Negative for nosebleeds and sore throat.    Eyes: Negative for blurred vision and double vision.   Respiratory: Negative for cough and shortness of breath.    Cardiovascular: Negative for chest pain, palpitations and leg swelling.   Gastrointestinal: Negative for abdominal pain, diarrhea, nausea and vomiting.   Genitourinary: Negative for dysuria and urgency.   Musculoskeletal: Positive for joint pain and myalgias. Negative for back pain.        B foot pain   Skin: Negative for rash.   Neurological: Negative for dizziness, loss of consciousness and headaches.   Psychiatric/Behavioral: The patient is nervous/anxious.         Physical Exam  Temp:  [36.3 °C (97.4 °F)-37.1 °C (98.7 °F)] 37 °C (98.6 °F)  Pulse:  [107-129] 116  BP: ()/(54-69) 102/61  SpO2:  [93 %-98 %] 98 %    Physical Exam   Constitutional: He is oriented to person, place, and time. He appears well-developed and well-nourished. No distress.   HENT:   Head: Normocephalic and atraumatic.   Nose: Nose normal.   Mouth/Throat: Oropharynx is clear and moist.   Eyes: Conjunctivae are normal.   Neck: No JVD present.   Cardiovascular: An irregular rhythm present. Tachycardia present. Exam reveals no gallop.   Murmur heard.  Pulmonary/Chest: Effort normal. No stridor. No respiratory distress. He has no wheezes. He has no rales.   Abdominal: Soft. There is no tenderness. There is no rebound and no guarding.   Musculoskeletal: He exhibits no edema.   Fistula L arm, no thrill  Unable to palpate DP/TP    Neurological: He is alert and oriented to person, place, and time.   Skin: Skin is warm and dry. No rash noted. He is not diaphoretic.   Psychiatric: He has a normal mood and affect. His behavior is normal. Judgment and thought content normal.   Nursing note and vitals reviewed.      Fluids    Intake/Output Summary (Last 24 hours) at 8/8/2019 0749  Last data filed at 8/8/2019 0600  Gross per 24 hour   Intake 300 ml   Output 1000 ml   Net -700 ml       Laboratory  Recent Labs     08/06/19  0540 08/07/19  0400 08/08/19  0410   WBC 9.0 10.2 7.9   RBC 2.92* 2.92* 2.73*   HEMOGLOBIN 9.1* 9.1* 8.3*   HEMATOCRIT 29.5* 29.3* 27.7*   .0* 100.3* 101.5*   MCH 31.2 31.2 30.4   MCHC 30.8* 31.1* 30.0*   RDW 60.2* 60.6* 61.4*   PLATELETCT 141* 123* 105*   MPV 10.1 10.1 10.5     Recent Labs     08/06/19  0540 08/07/19  0400 08/08/19  0410   SODIUM 138 137 136   POTASSIUM 4.9 5.0 4.5   CHLORIDE 99 97 99   CO2 26 22 25   GLUCOSE 89 96 132*   BUN 70* 90* 64*   CREATININE 6.55* 7.83* 6.11*   CALCIUM 9.0 9.1 9.1                   Imaging  DX-PORTABLE FLUOROSCOPY < 1 HOUR   Final Result      Single intraoperative image intended for localization and not for diagnostic purposes. CT procedure report for details.   Fluoroscopy Time:  0.03 seconds.  1 fluoroscopic images were obtained.      US-EXTREMITY ARTERY LOWER UNILAT RIGHT   Final Result      US-HEMODIALYSIS ACCESS CREATION DUPLEX UNILAT RIGHT   Final Result      US-HEMODIALYSIS GRAFT DUPLEX COMP UPPER EXTREMITY   Final Result      EC-ECHOCARDIOGRAM COMPLETE W/O CONT   Final Result      EC-ECHOCARDIOGRAM LTD W/O CONT   Final Result      DX-CHEST-LIMITED (1 VIEW)   Final Result      1.  No evidence of pneumothorax following RIGHT neck catheter placement   2.  Persistently enlarged cardiac silhouette   3.  Atherosclerosis      EC-ECHOCARDIOGRAM LTD W/O CONT   Final Result      CT-ABDOMEN-PELVIS W/O   Final Result      No CT evidence of acute inflammatory process in the abdomen or  pelvis      Very large pericardial effusion has increased from 2016      Small right pleural effusion has enlarged      Aortic valvular calcifications indicating aortic stenosis. There is also severe coronary disease      Atrophic kidneys with cysts. Hepatic cysts are also seen.      Moderate distal colonic diverticulosis without evidence of diverticulitis      US-HEMODIALYSIS GRAFT DUPLEX COMP UPPER EXTREMITY   Final Result      LL-[id number]-2 VIEW   Final Result      1.  No evidence of bowel obstruction.   2.  Abdominal aortic and mesenteric vascular calcifications.      DX-CHEST-PORTABLE (1 VIEW)   Final Result      1.  Stable cardiomegaly.   2.  No acute abnormality.      CL-PERICARDIOCENTESIS    (Results Pending)   IR-CVC NON TUNNELED > AGE 5    (Results Pending)        Assessment/Plan  * Pericardial tamponade- (present on admission)  Assessment & Plan  S/p drain of 1300ml bloody fluid  ?coumadin vs uremic      Chronic anticoagulation- (present on admission)  Assessment & Plan  Reversed, secondary to pericardial effusion  Not yet restarted secondary to bloody pericardial effusion  Discussed with cardiology for follow-up  Consider rechecking limited echo    Aortic stenosis- (present on admission)  Assessment & Plan  May be a TAVR candidate at some point if we can resolve his current acute issues    Hypotension  Assessment & Plan  Overall improved  Now more accurate blood pressure readings as he can have BP cuff in the left upper extremity  Continue midodrine    Essential hypertension- (present on admission)  Assessment & Plan  Unstable BP with hypotensive episodes , likely due to Pericardial tamponade.   Not an acute issue  Hold antihypertensives.     PAD (peripheral artery disease) ([facility])  Assessment & Plan  Significant compromise in the lower extremities, likely right greater than left  Arterial Doppler noted  Vascular surgery notified    Dyslipidemia- (present on admission)  Assessment & Plan  On statin therapy.   Monitor.     Elevated transaminase level  Assessment & Plan  ?secondary to hepatic congestion  Has been trending down      Shock (HCC)  Assessment & Plan  Pump vs BRITTON vs less likely sepsis or volume  Suspect due to BRITTON:  Midodrine   Repeat Echo showing relatively good function    Paroxysmal atrial fibrillation (HCC)- (present on admission)  Assessment & Plan  With  A flutter   AC has been reversed  Continue amiodarone  Plan was for ablation with Dr. Grissom  Discussed with cardiology        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HCC)  Assessment & Plan  L arm fistula now inactive  Status post creation of right upper extremity fistula  Permacath placement    Uremia- (present on admission)  Assessment & Plan  HD per Nephro    ESRD (end stage renal disease) on dialysis (HCC)- (present on admission)  Assessment & Plan  Post emergent hemodialysis 7/29/2019 and daily HD   Low k, renal diet  Lisinopril stopped.   Hold diuretics for now due to low BP        Hyperkalemia, diminished renal excretion- (present on admission)  Assessment & Plan  Post emergent hemodialysis   Monitor tele   Ace inh stopped  Low k diet.       Anemia in CKD (chronic kidney disease)- (present on admission)  Assessment & Plan  No symptoms of bleeding.   Monitor Hgb  Transfuse if needed for hemoglobin less than 7 gm/dL      COPD (chronic obstructive pulmonary disease) (HCC)- (present on admission)  Assessment & Plan  Without current exacerbation.   Respiratory therapy as needed.   Continue scheduled Flovent, As needed Xopenex with scheduled Ellipta  Rt protocols      BPH (benign prostatic hypertrophy)- (present on admission)  Assessment & Plan  Continue with Flomax.  Monitor for urinary retention symptoms.  Plan  Cardiology eval in regards to pericardial effusion and antiarrhythmic therapy versus ablation  Pain control, improved currently  Monitor electrolytes closely  Continue with antiarrhythmic therapy, restart beta-blocker titrate upwards restart Flomax  Prednisone  for gout pain, symptomatic care  Medically complex high risk  See orders  Discussed with intensivist and cardiology  VTE prophylaxis: none as pericardial fluid bloody  I have performed a physical exam and reviewed and updated ROS and Plan today . In review of yesterday's note , there are no changes except as documented above.

## 2019-08-08 NOTE — PROGRESS NOTES
Critical Care Progress Note    Date of admission  7/28/2019    Chief Complaint  79 y.o. male admitted 7/28/2019 with shortness of breath.    Hospital Course    This gentleman was transferred to the ICU with cardiac tamponade from a large pericardial effusion.  He required urgent pericardiocentesis.      Interval Problem Update  Reviewed last 24 hour events:      A-flutter last night  Now ST - heart rate improved  HD yest      He continues to improve somewhat.  He is weak, but feels better.  He has no cough, sputum production, wheezing, hemoptysis or dyspnea.  He denies abdominal pain, nausea or vomiting.  He has no angina or syncope.      Review of Systems  Review of Systems   Constitutional: Negative for diaphoresis and weight loss.   HENT: Negative for ear discharge, sore throat and tinnitus.    Eyes: Negative for photophobia, pain and discharge.   Respiratory: Negative for hemoptysis, sputum production and shortness of breath.    Cardiovascular: Negative for chest pain, orthopnea and claudication.   Gastrointestinal: Negative for abdominal pain, diarrhea and heartburn.   Genitourinary: Negative for dysuria, flank pain and hematuria.   Musculoskeletal: Negative for joint pain and myalgias.   Skin: Negative for itching.   Neurological: Negative for dizziness, sensory change, speech change and loss of consciousness.   Endo/Heme/Allergies: Negative for environmental allergies.   Psychiatric/Behavioral: Negative for hallucinations, substance abuse and suicidal ideas. The patient is not nervous/anxious.         Vital Signs for last 24 hours   Temp:  [36.2 °C (97.2 °F)-37.1 °C (98.7 °F)] 36.2 °C (97.2 °F)  Pulse:  [107-129] 116  BP: ()/(54-69) 102/61  SpO2:  [93 %-98 %] 98 %    Hemodynamic parameters for last 24 hours       Respiratory Information for the last 24 hours       Physical Exam   Physical Exam   Constitutional: He is oriented to person, place, and time. He appears well-developed. No distress.   HENT:    Head: Normocephalic.   Right Ear: External ear normal.   Left Ear: External ear normal.   Nose: Nose normal.   Mouth/Throat: Oropharynx is clear and moist.   Eyes: Pupils are equal, round, and reactive to light. EOM are normal. Right eye exhibits no discharge. Left eye exhibits no discharge. No scleral icterus.   Neck: Neck supple. No JVD present. No tracheal deviation present.   Cardiovascular: Intact distal pulses. Exam reveals no friction rub.   Sinus tachycardia   Pulmonary/Chest: Effort normal. No respiratory distress. He has no wheezes. He has no rales.   Abdominal: Soft. Bowel sounds are normal. He exhibits no distension. There is no tenderness. There is no guarding.   Musculoskeletal: Normal range of motion. He exhibits no deformity.   No cyanosis or clubbing   Neurological: He is alert and oriented to person, place, and time. No cranial nerve deficit. Coordination normal.   No focal weakness   Skin: Skin is warm and dry. He is not diaphoretic. No erythema. No pallor.       Medications  Current Facility-Administered Medications   Medication Dose Route Frequency Provider Last Rate Last Dose   • metoprolol (LOPRESSOR) tablet 50 mg  50 mg Oral TWICE DAILY Wei Serrano M.D.       • heparin injection 3,800 Units  3,800 Units Intracatheter ACUTE DIALYSIS PRN Fadi Najjar, M.D.   3,800 Units at 08/07/19 0833   • phenylephrine (GINA-SYNEPHRINE) 40 mg in  mL Infusion  0-300 mcg/min Intravenous Continuous Yandel Wilson M.D.       • tamsulosin (FLOMAX) capsule 0.4 mg  0.4 mg Oral AFTER BREAKFAST Wei Serrano M.D.   0.4 mg at 08/08/19 0840   • predniSONE (DELTASONE) tablet 20 mg  20 mg Oral DAILY Wei Serrano M.D.   20 mg at 08/08/19 0607   • diphenhydrAMINE (BENADRYL) injection 25 mg  25 mg Intravenous HS PRN Jeremy M Gonda, M.D.   25 mg at 08/03/19 0001   • umeclidinium-vilanterol (ANORO ELLIPTA) inhaler 1 Puff  1 Puff Inhalation DAILY Deniz Warner Jr., D.O.   1 Puff at 08/08/19  0605    And   • fluticasone (FLOVENT HFA) 44 MCG/ACT inhaler 88 mcg  2 Puff Inhalation DAILY Deniz Warner Jr. D.O.   88 mcg at 08/08/19 0600   • midodrine (PROAMATINE) tablet 15 mg  15 mg Oral TID WITH MEALS Deniz Warner Jr. D.O.   15 mg at 08/08/19 1237   • ALPRAZolam (XANAX) tablet 0.5 mg  0.5 mg Oral HS PRN Deniz Warner Jr. D.O.       • glucose 4 g chewable tablet 16 g  16 g Oral Q15 MIN PRN Jey Sanches D.O.        And   • DEXTROSE 10% BOLUS 250 mL  250 mL Intravenous Q15 MIN PRN MEGHAN Olson.O.       • norepinephrine (LEVOPHED) 8 mg in  mL Infusion  0-30 mcg/min Intravenous Continuous Jeremy M Gonda, M.D.   Stopped at 08/07/19 0842   • amiodarone (CORDARONE) tablet 200 mg  200 mg Oral DAILY Jonathan Shetty M.D.   200 mg at 08/08/19 0608   • calcium acetate (PHOS-LO) 667 MG tablet 2,001 mg  2,001 mg Oral TID WITH MEALS Jonathan Shetty M.D.   2,001 mg at 08/08/19 1237   • levalbuterol (XOPENEX) 1.25 MG/3ML nebulizer solution 1.25 mg  1.25 mg Nebulization Q4H PRN (RT) Jonathan Shetty M.D.       • omeprazole (PRILOSEC) capsule 40 mg  40 mg Oral QAM Jonathan Shetty M.D.   40 mg at 08/08/19 0607   • pravastatin (PRAVACHOL) tablet 20 mg  20 mg Oral Nightly Jonathan Shetty M.D.   20 mg at 08/07/19 2126   • acetaminophen (TYLENOL) tablet 650 mg  650 mg Oral Q6HRS PRN Jonathan Shetty M.D.   650 mg at 07/31/19 2207   • Pharmacy Consult Request ...Pain Management Review 1 Each  1 Each Other PHARMACY TO DOSE Jonathan Shetty M.D.        And   • oxyCODONE immediate-release (ROXICODONE) tablet 2.5 mg  2.5 mg Oral Q3HRS PRN Jonathan Shetty M.D.        And   • oxyCODONE immediate-release (ROXICODONE) tablet 5 mg  5 mg Oral Q3HRS PRN Jonathan Shetty M.D.   5 mg at 08/07/19 0353    And   • morphine (pf) 4 mg/ml injection 2 mg  2 mg Intravenous Q3HRS PRN Jonathan Shetty M.D.   2 mg at 07/29/19 2025   • ondansetron (ZOFRAN) syringe/vial injection 4 mg  4 mg Intravenous Q4HRS PRN Jonathan MATA  ROLDAN Shetty       • ondansetron (ZOFRAN ODT) dispertab 4 mg  4 mg Oral Q4HRS PRN Jonathan Shetty M.D.       • senna-docusate (PERICOLACE or SENOKOT S) 8.6-50 MG per tablet 2 Tab  2 Tab Oral BID Jonathan Shetty M.D.   2 Tab at 08/08/19 0607    And   • polyethylene glycol/lytes (MIRALAX) PACKET 1 Packet  1 Packet Oral QDAY PRN Jonathan Shetty M.D.   1 Packet at 08/01/19 1713    And   • magnesium hydroxide (MILK OF MAGNESIA) suspension 30 mL  30 mL Oral QDAY PRN Jonathan Shetty M.D.        And   • bisacodyl (DULCOLAX) suppository 10 mg  10 mg Rectal QDAY PRN Jonathan Shetty M.D.       • lidocaine (XYLOCAINE) 1 % injection 1 mL  1 mL Other DIALYSIS PRN Long Larson M.D.   1 mL at 08/01/19 1915   • heparin injection 1,500 Units  1,500 Units Intravenous DIALYSIS PRN Long Larson M.D.   1,500 Units at 07/31/19 0755       Fluids    Intake/Output Summary (Last 24 hours) at 8/8/2019 1724  Last data filed at 8/8/2019 0600  Gross per 24 hour   Intake 300 ml   Output 0 ml   Net 300 ml       Laboratory          Recent Labs     08/06/19  0540 08/07/19  0400 08/08/19  0410   SODIUM 138 137 136   POTASSIUM 4.9 5.0 4.5   CHLORIDE 99 97 99   CO2 26 22 25   BUN 70* 90* 64*   CREATININE 6.55* 7.83* 6.11*   MAGNESIUM 1.8 1.9 1.9   PHOSPHORUS  --  5.3*  --    CALCIUM 9.0 9.1 9.1     Recent Labs     08/06/19  0540 08/07/19  0400 08/08/19  0410   ALTSGPT 136* 78* 30   ASTSGOT 19 10* 10*   ALKPHOSPHAT 157* 146* 146*   TBILIRUBIN 0.8 0.8 0.6   GLUCOSE 89 96 132*     Recent Labs     08/06/19  0540 08/07/19  0400 08/08/19 0410   WBC 9.0 10.2 7.9   NEUTSPOLYS 85.60* 88.50* 90.50*   LYMPHOCYTES 5.90* 4.70* 3.60*   MONOCYTES 7.40 5.70 4.80   EOSINOPHILS 0.20 0.40 0.00   BASOPHILS 0.10 0.10 0.30   ASTSGOT 19 10* 10*   ALTSGPT 136* 78* 30   ALKPHOSPHAT 157* 146* 146*   TBILIRUBIN 0.8 0.8 0.6     Recent Labs     08/06/19 0540 08/07/19  0400 08/08/19 0410   RBC 2.92* 2.92* 2.73*   HEMOGLOBIN 9.1* 9.1* 8.3*   HEMATOCRIT 29.5* 29.3* 27.7*    PLATELETCT 141* 123* 105*       Imaging  None    Assessment/Plan  * Pericardial tamponade- (present on admission)  Assessment & Plan  S/P emergent pericardiocentesis on 8/1 with resolution of tamponade  Pericardial drain removed on 8/3  Pericardial fluid cultures and cytology are negative  Suspect due to ESRD with uremia  Consider pericardial window if the effusion re-accumulates    Chronic anticoagulation- (present on admission)  Assessment & Plan  Coumadin prior to admission  Anticoagulation was reversed with prothrombin complex concentrate and vitamin K as well as FFP  Hold anticoagulation    Aortic stenosis- (present on admission)  Assessment & Plan  Mean valve gradient 30 mmHg    Hypotension  Assessment & Plan  Continue midodrine, 15 mg 3 times daily    Essential hypertension- (present on admission)  Assessment & Plan  Increase metoprolol, 50 mg twice daily    PAD (peripheral artery disease) (HCC)  Assessment & Plan  Continue statin    Dyslipidemia- (present on admission)  Assessment & Plan  Continue statin    Elevated transaminase level  Assessment & Plan  Due to ischemic hepatopathy  Resolving    Shock (HCC)  Assessment & Plan  Resolved    Paroxysmal atrial fibrillation (HCC)- (present on admission)  Assessment & Plan  He has been having paroxysmal atrial flutter  We will have EP cardiologist see  Increase metoprolol, 50 mg twice daily  Continue amiodarone, 200 mg daily    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HCC)  Assessment & Plan  S/P right upper extremity brachiocephalic fistula creation on 8/6  S/P ligation of left upper extremity radiocephalic fistula  Right IJ tunneled TDC placed on 8/6    ESRD (end stage renal disease) on dialysis (HCC)- (present on admission)  Assessment & Plan  On hemodialysis    COPD (chronic obstructive pulmonary disease) (HCC)- (present on admission)  Assessment & Plan  No acute exacerbation  Continue Flovent and Anoro  Continue RT protocols    BPH (benign prostatic hypertrophy)- (present on  admission)  Assessment & Plan          VTE:  Contraindicated  Ulcer: Not Indicated  Lines: Central Line  Ongoing indication addressed    I have performed a physical exam and reviewed and updated ROS and Plan today (8/8/2019). In review of yesterday's note (8/7/2019), there are no changes except as documented above.     Discussed patient condition and risk of morbidity and/or mortality with Hospitalist, Family, RN, RT, Pharmacy, Charge nurse / hot rounds and QA team     Yandel Wilson MD  Pulmonary and Critical Care Medicine

## 2019-08-08 NOTE — PROGRESS NOTES
Patient converted to 2:1 a flutter at 0450, -110 upon this RN's arrival.  Hemodynamically stable, asymptomatic.  Dr. Wilson notified.  No interventions ordered at this time.

## 2019-08-08 NOTE — PROGRESS NOTES
Nephrology Daily Progress Note    Date of Service  8/8/2019    Chief Complaint  79 y.o. male with ESRD/HD,admitted 7/28/2019 with worsening SOB, hyperkalemia    Interval Problem Update  Patient feels better, sitting in chair    Review of Systems  Review of Systems   Constitutional: Negative for chills, fever and malaise/fatigue.   Respiratory: Negative for cough and shortness of breath.    Cardiovascular: Negative for chest pain and leg swelling.   Gastrointestinal: Negative for nausea and vomiting.   Genitourinary: Negative for dysuria, frequency and urgency.        Physical Exam  Temp:  [36.2 °C (97.2 °F)-37.1 °C (98.7 °F)] 36.2 °C (97.2 °F)  Pulse:  [107-129] 116  BP: ()/(54-69) 102/61  SpO2:  [93 %-98 %] 98 %    Physical Exam   Constitutional: He is oriented to person, place, and time. No distress.   HENT:   Mouth/Throat: No oropharyngeal exudate.   Eyes: No scleral icterus.   Cardiovascular: Regular rhythm. Tachycardia present.   No murmur heard.  Pulmonary/Chest: Effort normal and breath sounds normal. No respiratory distress. He has no wheezes.   Musculoskeletal: He exhibits no edema or deformity.   Neurological: He is alert and oriented to person, place, and time.   Skin: Skin is warm. He is not diaphoretic. No erythema.   Psychiatric: He has a normal mood and affect. His behavior is normal.   Nursing note and vitals reviewed.      Fluids    Intake/Output Summary (Last 24 hours) at 8/8/2019 1410  Last data filed at 8/8/2019 0600  Gross per 24 hour   Intake 300 ml   Output 0 ml   Net 300 ml       Laboratory  Recent Labs     08/06/19  0540 08/07/19  0400 08/08/19  0410   WBC 9.0 10.2 7.9   RBC 2.92* 2.92* 2.73*   HEMOGLOBIN 9.1* 9.1* 8.3*   HEMATOCRIT 29.5* 29.3* 27.7*   .0* 100.3* 101.5*   MCH 31.2 31.2 30.4   MCHC 30.8* 31.1* 30.0*   RDW 60.2* 60.6* 61.4*   PLATELETCT 141* 123* 105*   MPV 10.1 10.1 10.5     Recent Labs     08/06/19  0540 08/07/19  0400 08/08/19  0410   SODIUM 138 137 136    POTASSIUM 4.9 5.0 4.5   CHLORIDE 99 97 99   CO2 26 22 25   GLUCOSE 89 96 132*   BUN 70* 90* 64*   CREATININE 6.55* 7.83* 6.11*   CALCIUM 9.0 9.1 9.1         No results for input(s): NTPROBNP in the last 72 hours.        Imaging  DX-PORTABLE FLUOROSCOPY < 1 HOUR   Final Result      Single intraoperative image intended for localization and not for diagnostic purposes. CT procedure report for details.   Fluoroscopy Time:  0.03 seconds.  1 fluoroscopic images were obtained.      US-EXTREMITY ARTERY LOWER UNILAT RIGHT   Final Result      US-HEMODIALYSIS ACCESS CREATION DUPLEX UNILAT RIGHT   Final Result      US-HEMODIALYSIS GRAFT DUPLEX COMP UPPER EXTREMITY   Final Result      EC-ECHOCARDIOGRAM COMPLETE W/O CONT   Final Result      EC-ECHOCARDIOGRAM LTD W/O CONT   Final Result      DX-CHEST-LIMITED (1 VIEW)   Final Result      1.  No evidence of pneumothorax following RIGHT neck catheter placement   2.  Persistently enlarged cardiac silhouette   3.  Atherosclerosis      EC-ECHOCARDIOGRAM LTD W/O CONT   Final Result      CT-ABDOMEN-PELVIS W/O   Final Result      No CT evidence of acute inflammatory process in the abdomen or pelvis      Very large pericardial effusion has increased from 2016      Small right pleural effusion has enlarged      Aortic valvular calcifications indicating aortic stenosis. There is also severe coronary disease      Atrophic kidneys with cysts. Hepatic cysts are also seen.      Moderate distal colonic diverticulosis without evidence of diverticulitis      US-HEMODIALYSIS GRAFT DUPLEX COMP UPPER EXTREMITY   Final Result      QT-[id number] VIEW   Final Result      1.  No evidence of bowel obstruction.   2.  Abdominal aortic and mesenteric vascular calcifications.      DX-CHEST-PORTABLE (1 VIEW)   Final Result      1.  Stable cardiomegaly.   2.  No acute abnormality.      CL-PERICARDIOCENTESIS    (Results Pending)   IR-CVC NON TUNNELED > AGE 5    (Results Pending)          Assessment/Plan  1.ESRD.  2.Hypotensive: Better  3.Electrolytes: Better  4.Anemia: Hemoglobin is worse today  5.AVF malfunction.  Recs:  no need for HD today  Renal diet  Daily labs  Renal dose all meds  Avoid nephrotoxins  Prognosis guarded.

## 2019-08-08 NOTE — PROGRESS NOTES
Dr. Wilson and Maggie aware of sinus tachycardia sustaining 120-130's.  Hemodynamically stable after hemodialysis and able to ambulate without significant change in rate.

## 2019-08-08 NOTE — PROGRESS NOTES
Received bedside report assumed care of pt. Pt resting comfortably in chair. Bedside table, and call light within reach. Bed alarm on and in lowest position as well as chair alarm arm and in locked position. Pt denying pain at this time. VS stable. Updated whiteboard in room and discussed today's POC.

## 2019-08-09 ENCOUNTER — TELEPHONE (OUTPATIENT)
Dept: CARDIOLOGY | Facility: MEDICAL CENTER | Age: 79
End: 2019-08-09

## 2019-08-09 ENCOUNTER — APPOINTMENT (OUTPATIENT)
Dept: RADIOLOGY | Facility: MEDICAL CENTER | Age: 79
DRG: 628 | End: 2019-08-09
Attending: HOSPITALIST
Payer: MEDICARE

## 2019-08-09 LAB
ALBUMIN SERPL BCP-MCNC: 3.1 G/DL (ref 3.2–4.9)
ALBUMIN/GLOB SERPL: 1 G/DL
ALP SERPL-CCNC: 151 U/L (ref 30–99)
ALT SERPL-CCNC: 17 U/L (ref 2–50)
ANION GAP SERPL CALC-SCNC: 15 MMOL/L (ref 0–11.9)
AST SERPL-CCNC: 13 U/L (ref 12–45)
BASOPHILS # BLD AUTO: 0.1 % (ref 0–1.8)
BASOPHILS # BLD: 0.01 K/UL (ref 0–0.12)
BILIRUB SERPL-MCNC: 0.5 MG/DL (ref 0.1–1.5)
BUN SERPL-MCNC: 81 MG/DL (ref 8–22)
CALCIUM SERPL-MCNC: 9.4 MG/DL (ref 8.5–10.5)
CHLORIDE SERPL-SCNC: 96 MMOL/L (ref 96–112)
CO2 SERPL-SCNC: 23 MMOL/L (ref 20–33)
CREAT SERPL-MCNC: 8.09 MG/DL (ref 0.5–1.4)
EOSINOPHIL # BLD AUTO: 0.04 K/UL (ref 0–0.51)
EOSINOPHIL NFR BLD: 0.3 % (ref 0–6.9)
ERYTHROCYTE [DISTWIDTH] IN BLOOD BY AUTOMATED COUNT: 62.1 FL (ref 35.9–50)
GLOBULIN SER CALC-MCNC: 3 G/DL (ref 1.9–3.5)
GLUCOSE SERPL-MCNC: 107 MG/DL (ref 65–99)
HCT VFR BLD AUTO: 28.1 % (ref 42–52)
HGB BLD-MCNC: 8.7 G/DL (ref 14–18)
IMM GRANULOCYTES # BLD AUTO: 0.07 K/UL (ref 0–0.11)
IMM GRANULOCYTES NFR BLD AUTO: 0.6 % (ref 0–0.9)
LYMPHOCYTES # BLD AUTO: 0.35 K/UL (ref 1–4.8)
LYMPHOCYTES NFR BLD: 2.8 % (ref 22–41)
MAGNESIUM SERPL-MCNC: 1.8 MG/DL (ref 1.5–2.5)
MCH RBC QN AUTO: 31.5 PG (ref 27–33)
MCHC RBC AUTO-ENTMCNC: 31 G/DL (ref 33.7–35.3)
MCV RBC AUTO: 101.8 FL (ref 81.4–97.8)
MONOCYTES # BLD AUTO: 0.63 K/UL (ref 0–0.85)
MONOCYTES NFR BLD AUTO: 5.1 % (ref 0–13.4)
NEUTROPHILS # BLD AUTO: 11.24 K/UL (ref 1.82–7.42)
NEUTROPHILS NFR BLD: 91.1 % (ref 44–72)
NRBC # BLD AUTO: 0 K/UL
NRBC BLD-RTO: 0 /100 WBC
PHOSPHATE SERPL-MCNC: 4.6 MG/DL (ref 2.5–4.5)
PLATELET # BLD AUTO: 118 K/UL (ref 164–446)
PMV BLD AUTO: 11 FL (ref 9–12.9)
POTASSIUM SERPL-SCNC: 4.6 MMOL/L (ref 3.6–5.5)
PROT SERPL-MCNC: 6.1 G/DL (ref 6–8.2)
RBC # BLD AUTO: 2.76 M/UL (ref 4.7–6.1)
SODIUM SERPL-SCNC: 134 MMOL/L (ref 135–145)
WBC # BLD AUTO: 12.3 K/UL (ref 4.8–10.8)

## 2019-08-09 PROCEDURE — A9270 NON-COVERED ITEM OR SERVICE: HCPCS | Performed by: INTERNAL MEDICINE

## 2019-08-09 PROCEDURE — 83735 ASSAY OF MAGNESIUM: CPT

## 2019-08-09 PROCEDURE — 84100 ASSAY OF PHOSPHORUS: CPT

## 2019-08-09 PROCEDURE — 99233 SBSQ HOSP IP/OBS HIGH 50: CPT | Performed by: INTERNAL MEDICINE

## 2019-08-09 PROCEDURE — 90935 HEMODIALYSIS ONE EVALUATION: CPT | Performed by: INTERNAL MEDICINE

## 2019-08-09 PROCEDURE — 700102 HCHG RX REV CODE 250 W/ 637 OVERRIDE(OP): Performed by: INTERNAL MEDICINE

## 2019-08-09 PROCEDURE — 85025 COMPLETE CBC W/AUTO DIFF WBC: CPT

## 2019-08-09 PROCEDURE — 700111 HCHG RX REV CODE 636 W/ 250 OVERRIDE (IP): Performed by: HOSPITALIST

## 2019-08-09 PROCEDURE — A9270 NON-COVERED ITEM OR SERVICE: HCPCS | Performed by: HOSPITALIST

## 2019-08-09 PROCEDURE — P9047 ALBUMIN (HUMAN), 25%, 50ML: HCPCS | Mod: JG | Performed by: INTERNAL MEDICINE

## 2019-08-09 PROCEDURE — 700102 HCHG RX REV CODE 250 W/ 637 OVERRIDE(OP): Performed by: HOSPITALIST

## 2019-08-09 PROCEDURE — 770020 HCHG ROOM/CARE - TELE (206)

## 2019-08-09 PROCEDURE — 99233 SBSQ HOSP IP/OBS HIGH 50: CPT | Performed by: HOSPITALIST

## 2019-08-09 PROCEDURE — 700111 HCHG RX REV CODE 636 W/ 250 OVERRIDE (IP): Mod: JG | Performed by: INTERNAL MEDICINE

## 2019-08-09 PROCEDURE — 80053 COMPREHEN METABOLIC PANEL: CPT

## 2019-08-09 PROCEDURE — 90935 HEMODIALYSIS ONE EVALUATION: CPT

## 2019-08-09 PROCEDURE — 71045 X-RAY EXAM CHEST 1 VIEW: CPT

## 2019-08-09 RX ORDER — AMIODARONE HYDROCHLORIDE 200 MG/1
200 TABLET ORAL TWICE DAILY
Status: DISCONTINUED | OUTPATIENT
Start: 2019-08-09 | End: 2019-08-11 | Stop reason: HOSPADM

## 2019-08-09 RX ORDER — ALBUMIN (HUMAN) 12.5 G/50ML
25 SOLUTION INTRAVENOUS ONCE
Status: COMPLETED | OUTPATIENT
Start: 2019-08-09 | End: 2019-08-09

## 2019-08-09 RX ORDER — DIGOXIN 125 MCG
250 TABLET ORAL ONCE
Status: COMPLETED | OUTPATIENT
Start: 2019-08-09 | End: 2019-08-09

## 2019-08-09 RX ADMIN — HEPARIN SODIUM 3800 UNITS: 1000 INJECTION, SOLUTION INTRAVENOUS; SUBCUTANEOUS at 14:11

## 2019-08-09 RX ADMIN — ALBUMIN (HUMAN) 25 G: 5 SOLUTION INTRAVENOUS at 12:00

## 2019-08-09 RX ADMIN — METOPROLOL TARTRATE 50 MG: 50 TABLET ORAL at 17:25

## 2019-08-09 RX ADMIN — Medication 2001 MG: at 17:24

## 2019-08-09 RX ADMIN — Medication 2001 MG: at 06:23

## 2019-08-09 RX ADMIN — AMIODARONE HYDROCHLORIDE 200 MG: 200 TABLET ORAL at 17:25

## 2019-08-09 RX ADMIN — UMECLIDINIUM BROMIDE AND VILANTEROL TRIFENATATE 1 PUFF: 62.5; 25 POWDER RESPIRATORY (INHALATION) at 06:14

## 2019-08-09 RX ADMIN — FLUTICASONE PROPIONATE 88 MCG: 44 AEROSOL, METERED RESPIRATORY (INHALATION) at 06:00

## 2019-08-09 RX ADMIN — OXYCODONE HYDROCHLORIDE 5 MG: 5 TABLET ORAL at 06:23

## 2019-08-09 RX ADMIN — DIGOXIN 250 MCG: 125 TABLET ORAL at 16:29

## 2019-08-09 RX ADMIN — PRAVASTATIN SODIUM 20 MG: 20 TABLET ORAL at 21:44

## 2019-08-09 RX ADMIN — PREDNISONE 20 MG: 20 TABLET ORAL at 06:23

## 2019-08-09 RX ADMIN — MIDODRINE HYDROCHLORIDE 15 MG: 5 TABLET ORAL at 17:25

## 2019-08-09 RX ADMIN — MIDODRINE HYDROCHLORIDE 15 MG: 5 TABLET ORAL at 11:58

## 2019-08-09 RX ADMIN — METOPROLOL TARTRATE 50 MG: 50 TABLET ORAL at 06:30

## 2019-08-09 RX ADMIN — OXYCODONE HYDROCHLORIDE 5 MG: 5 TABLET ORAL at 01:10

## 2019-08-09 RX ADMIN — MIDODRINE HYDROCHLORIDE 15 MG: 5 TABLET ORAL at 06:23

## 2019-08-09 RX ADMIN — AMIODARONE HYDROCHLORIDE 200 MG: 200 TABLET ORAL at 06:24

## 2019-08-09 RX ADMIN — TAMSULOSIN HYDROCHLORIDE 0.4 MG: 0.4 CAPSULE ORAL at 08:44

## 2019-08-09 RX ADMIN — OMEPRAZOLE 40 MG: 20 CAPSULE, DELAYED RELEASE ORAL at 06:23

## 2019-08-09 RX ADMIN — Medication 2001 MG: at 11:58

## 2019-08-09 ASSESSMENT — ENCOUNTER SYMPTOMS
LOSS OF CONSCIOUSNESS: 0
NAUSEA: 0
HEADACHES: 0
COUGH: 0
SEIZURES: 0
MYALGIAS: 1
NECK PAIN: 0
HALLUCINATIONS: 0
DIZZINESS: 0
SPUTUM PRODUCTION: 0
BLURRED VISION: 0
FOCAL WEAKNESS: 0
DIARRHEA: 0
STRIDOR: 0
SORE THROAT: 0
PALPITATIONS: 0
ABDOMINAL PAIN: 0
BACK PAIN: 0
COUGH: 1
PHOTOPHOBIA: 0
NERVOUS/ANXIOUS: 0
FEVER: 0
DOUBLE VISION: 0
BLOOD IN STOOL: 0
MYALGIAS: 0
SPEECH CHANGE: 0
NERVOUS/ANXIOUS: 1
CHILLS: 0
SHORTNESS OF BREATH: 0
VOMITING: 0
HEMOPTYSIS: 0

## 2019-08-09 ASSESSMENT — LIFESTYLE VARIABLES: SUBSTANCE_ABUSE: 0

## 2019-08-09 NOTE — TELEPHONE ENCOUNTER
Connie,    I just recvd a call from this patient. He stated that all of the other issues have been resolved and he is anticipating a discharge coming up soon. He's wondering if he can get back on the schedule for the ablation. Can you please review and let me know if he still needs to be seen in clinic first or if we can get him rescheduled. Dr. Grissom is booking out into October right now for an outpatient flutter ablation.    Thank You,  Marie

## 2019-08-09 NOTE — PROGRESS NOTES
Critical Care Progress Note    Date of admission  7/28/2019    Chief Complaint  79 y.o. male admitted 7/28/2019 with shortness of breath.    Hospital Course    This gentleman was transferred to the ICU with cardiac tamponade from a large pericardial effusion.  He required urgent pericardiocentesis.      Interval Problem Update  Reviewed last 24 hour events:      ST  On HD this am  Atrial flutter  Anuric      Overall, he continues to feel better.  He has no worsening dyspnea.  He has a dry cough.  He has no angina, palpitations or syncope.  He has no nausea, vomiting or abdominal pain.  His blood pressure continues to run low while on dialysis.      Review of Systems  Review of Systems   Constitutional: Negative for chills and fever.   HENT: Negative for ear pain and nosebleeds.    Eyes: Negative for blurred vision, double vision and photophobia.   Respiratory: Positive for cough. Negative for hemoptysis, sputum production and stridor.    Cardiovascular: Negative for chest pain, palpitations and leg swelling.   Gastrointestinal: Negative for abdominal pain, blood in stool, nausea and vomiting.   Genitourinary: Negative for dysuria, hematuria and urgency.   Musculoskeletal: Negative for joint pain, myalgias and neck pain.   Skin: Negative for rash.   Neurological: Negative for speech change, focal weakness, seizures and headaches.   Endo/Heme/Allergies: Negative for environmental allergies.   Psychiatric/Behavioral: Negative for hallucinations, substance abuse and suicidal ideas. The patient is not nervous/anxious.         Vital Signs for last 24 hours   Temp:  [35.9 °C (96.7 °F)-36.6 °C (97.9 °F)] 36.2 °C (97.1 °F)  Pulse:  [105-121] 114  Resp:  [18-28] 18  BP: ()/(42-70) 102/56  SpO2:  [94 %-100 %] 100 %    Hemodynamic parameters for last 24 hours       Respiratory Information for the last 24 hours       Physical Exam   Physical Exam   Constitutional: He is oriented to person, place, and time. He appears  well-developed. No distress.   HENT:   Head: Normocephalic and atraumatic.   Right Ear: External ear normal.   Left Ear: External ear normal.   Mouth/Throat: No oropharyngeal exudate.   Eyes: Pupils are equal, round, and reactive to light. Right eye exhibits no discharge. Left eye exhibits no discharge.   Neck: Neck supple. No tracheal deviation present.   Cardiovascular: Intact distal pulses. Exam reveals no gallop.   Sinus tachycardia   Pulmonary/Chest: Effort normal. No stridor. No respiratory distress. He has no wheezes. He has no rales.   Abdominal: Soft. Bowel sounds are normal. He exhibits no distension. There is no tenderness. There is no rebound.   Musculoskeletal: Normal range of motion. He exhibits no edema.   No cyanosis or clubbing   Neurological: He is alert and oriented to person, place, and time. No cranial nerve deficit. Coordination normal.   No focal weakness   Skin: Skin is warm and dry. No rash noted. He is not diaphoretic. No erythema.       Medications  Current Facility-Administered Medications   Medication Dose Route Frequency Provider Last Rate Last Dose   • amiodarone (CORDARONE) tablet 200 mg  200 mg Oral TWICE DAILY Juanita Pace M.D.   200 mg at 08/09/19 1725   • metoprolol (LOPRESSOR) tablet 50 mg  50 mg Oral TWICE DAILY Wei Serrano M.D.   50 mg at 08/09/19 1725   • heparin injection 3,800 Units  3,800 Units Intracatheter ACUTE DIALYSIS PRN Fadi Najjar, M.D.   3,800 Units at 08/09/19 1411   • phenylephrine (GINA-SYNEPHRINE) 40 mg in  mL Infusion  0-300 mcg/min Intravenous Continuous Yandel Wilson M.D.       • tamsulosin (FLOMAX) capsule 0.4 mg  0.4 mg Oral AFTER BREAKFAST Wei Serrano M.D.   0.4 mg at 08/09/19 0844   • predniSONE (DELTASONE) tablet 20 mg  20 mg Oral DAILY Wei Serrano M.D.   20 mg at 08/09/19 0623   • diphenhydrAMINE (BENADRYL) injection 25 mg  25 mg Intravenous HS PRN Jeremy M Gonda, M.D.   25 mg at 08/03/19 0001   •  umeclidinium-vilanterol (ANORO ELLIPTA) inhaler 1 Puff  1 Puff Inhalation DAILY Deniz Warner Jr. D.O.   1 Puff at 08/09/19 0614    And   • fluticasone (FLOVENT HFA) 44 MCG/ACT inhaler 88 mcg  2 Puff Inhalation DAILY Deniz Warner Jr. D.O.   88 mcg at 08/09/19 0600   • midodrine (PROAMATINE) tablet 15 mg  15 mg Oral TID WITH MEALS Deniz Warner Jr. D.O.   15 mg at 08/09/19 1725   • ALPRAZolam (XANAX) tablet 0.5 mg  0.5 mg Oral HS PRN Deniz Warner Jr., D.O.       • glucose 4 g chewable tablet 16 g  16 g Oral Q15 MIN PRN Jey Sanches D.OLise        And   • DEXTROSE 10% BOLUS 250 mL  250 mL Intravenous Q15 MIN PRN MEGHAN Olson.O.       • norepinephrine (LEVOPHED) 8 mg in  mL Infusion  0-30 mcg/min Intravenous Continuous Jeremy M Gonda, M.D.   Stopped at 08/07/19 0842   • calcium acetate (PHOS-LO) 667 MG tablet 2,001 mg  2,001 mg Oral TID WITH MEALS Jonathan Shetty M.D.   2,001 mg at 08/09/19 1724   • levalbuterol (XOPENEX) 1.25 MG/3ML nebulizer solution 1.25 mg  1.25 mg Nebulization Q4H PRN (RT) Jonathan Shetty M.D.       • omeprazole (PRILOSEC) capsule 40 mg  40 mg Oral QAM Jonathan Shetty M.D.   40 mg at 08/09/19 0623   • pravastatin (PRAVACHOL) tablet 20 mg  20 mg Oral Nightly Jonathan Shetty M.D.   20 mg at 08/08/19 2020   • acetaminophen (TYLENOL) tablet 650 mg  650 mg Oral Q6HRS PRN Jonathan Shetty M.D.   650 mg at 07/31/19 2207   • Pharmacy Consult Request ...Pain Management Review 1 Each  1 Each Other PHARMACY TO DOSE Jonatahn Shetty M.D.        And   • oxyCODONE immediate-release (ROXICODONE) tablet 2.5 mg  2.5 mg Oral Q3HRS PRN Jonathan Shetty M.D.        And   • oxyCODONE immediate-release (ROXICODONE) tablet 5 mg  5 mg Oral Q3HRS PRN Jonathan Shetty M.D.   5 mg at 08/09/19 0623    And   • morphine (pf) 4 mg/ml injection 2 mg  2 mg Intravenous Q3HRS PRN Jonathan Shetty M.D.   2 mg at 07/29/19 2025   • ondansetron (ZOFRAN) syringe/vial injection 4 mg  4 mg Intravenous  Q4HRS PRN Jonathan Shetty M.D.       • ondansetron (ZOFRAN ODT) dispertab 4 mg  4 mg Oral Q4HRS PRN Jonathan Shetty M.D.       • senna-docusate (PERICOLACE or SENOKOT S) 8.6-50 MG per tablet 2 Tab  2 Tab Oral BID Jonathan Shetty M.D.   Stopped at 08/08/19 1800    And   • polyethylene glycol/lytes (MIRALAX) PACKET 1 Packet  1 Packet Oral QDAY PRJARRELL Shetty M.D.   1 Packet at 08/01/19 1713    And   • magnesium hydroxide (MILK OF MAGNESIA) suspension 30 mL  30 mL Oral QDAY PRJARRELL Shetty M.D.        And   • bisacodyl (DULCOLAX) suppository 10 mg  10 mg Rectal QDAY PRJARRELL Shetty M.D.       • lidocaine (XYLOCAINE) 1 % injection 1 mL  1 mL Other DIALYSIS PRJARRELL Larson M.D.   1 mL at 08/01/19 1915   • heparin injection 1,500 Units  1,500 Units Intravenous DIALYSIS PRN Long Larson M.D.   1,500 Units at 07/31/19 0755       Fluids    Intake/Output Summary (Last 24 hours) at 8/9/2019 1740  Last data filed at 8/9/2019 1424  Gross per 24 hour   Intake 1820 ml   Output 2300 ml   Net -480 ml       Laboratory          Recent Labs     08/07/19  0400 08/08/19  0410 08/09/19  0545   SODIUM 137 136 134*   POTASSIUM 5.0 4.5 4.6   CHLORIDE 97 99 96   CO2 22 25 23   BUN 90* 64* 81*   CREATININE 7.83* 6.11* 8.09*   MAGNESIUM 1.9 1.9 1.8   PHOSPHORUS 5.3*  --  4.6*   CALCIUM 9.1 9.1 9.4     Recent Labs     08/07/19  0400 08/08/19  0410 08/09/19  0545   ALTSGPT 78* 30 17   ASTSGOT 10* 10* 13   ALKPHOSPHAT 146* 146* 151*   TBILIRUBIN 0.8 0.6 0.5   GLUCOSE 96 132* 107*     Recent Labs     08/07/19  0400 08/08/19  0410 08/09/19  0545   WBC 10.2 7.9 12.3*   NEUTSPOLYS 88.50* 90.50* 91.10*   LYMPHOCYTES 4.70* 3.60* 2.80*   MONOCYTES 5.70 4.80 5.10   EOSINOPHILS 0.40 0.00 0.30   BASOPHILS 0.10 0.30 0.10   ASTSGOT 10* 10* 13   ALTSGPT 78* 30 17   ALKPHOSPHAT 146* 146* 151*   TBILIRUBIN 0.8 0.6 0.5     Recent Labs     08/07/19  0400 08/08/19  0410 08/09/19  0545   RBC 2.92* 2.73* 2.76*   HEMOGLOBIN 9.1* 8.3* 8.7*    HEMATOCRIT 29.3* 27.7* 28.1*   PLATELETCT 123* 105* 118*       Imaging  X-Ray:  I have personally reviewed the images and compared with prior images. and My impression is: Mild increased edema    Assessment/Plan  * Pericardial tamponade- (present on admission)  Assessment & Plan  S/P emergent pericardiocentesis on 8/1 with resolution of tamponade  Pericardial drain removed on 8/3  Pericardial fluid cultures and cytology are negative  Suspect due to ESRD with uremia  Consider pericardial window if the effusion re-accumulates  Repeat echo    Chronic anticoagulation- (present on admission)  Assessment & Plan  Coumadin prior to admission  Anticoagulation was reversed with prothrombin complex concentrate and vitamin K as well as FFP  Continue to hold anticoagulation    Aortic stenosis- (present on admission)  Assessment & Plan  Mean valve gradient 30 mmHg    Hypotension  Assessment & Plan  Continue midodrine, 15 mg 3 times daily    Essential hypertension- (present on admission)  Assessment & Plan  Continue metoprolol, 50 mg twice daily    PAD (peripheral artery disease) ([facility])  Assessment & Plan  Continue pravastatin    Dyslipidemia- (present on admission)  Assessment & Plan  Continue pravastatin    Paroxysmal atrial fibrillation ([facility])- (present on admission)  Assessment & Plan  He continues to have paroxysmal atrial flutter  Increase amiodarone, 200 mg twice daily  Continue metoprolol, 50 mg twice daily    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[facility])  Assessment & Plan  S/P right upper extremity brachiocephalic fistula creation on 8/6  S/P ligation of left upper extremity radiocephalic fistula  Right IJ tunneled TDC placed on 8/6    ESRD (end stage renal disease) on dialysis ([facility])- (present on admission)  Assessment & Plan  Continue hemodialysis    COPD (chronic obstructive pulmonary disease) ([facility])- (present on admission)  Assessment & Plan  No acute exacerbation  Continue Anoro and Flovent  RT protocols    BPH (benign prostatic  hypertrophy)- (present on admission)  Assessment & Plan          VTE:  Contraindicated  Ulcer: Not Indicated  Lines: Central Line  Ongoing indication addressed    I have performed a physical exam and reviewed and updated ROS and Plan today (8/9/2019). In review of yesterday's note (8/8/2019), there are no changes except as documented above.     Discussed patient condition and risk of morbidity and/or mortality with Hospitalist, RN, RT, Pharmacy, Charge nurse / hot rounds and QA team     Yandel Wilson MD  Pulmonary and Critical Care Medicine

## 2019-08-09 NOTE — CARE PLAN
MDs communicating with pt and each other in figuring out plan of care for pt. Discussing possible ablation and restarting anticoags after checking a repeat echo.     Pt up to bathroom. Stated had small loose BM.

## 2019-08-09 NOTE — PROGRESS NOTES
Pt with ESRD, presented with SOB.  Pt is doing better, tachycardiac, awaiting cardiology input.  Seen and examined while getting HD.

## 2019-08-09 NOTE — PROGRESS NOTES
Cardiology Follow Up Progress Note    Date of Service  8/9/2019    Attending Physician  Wei Serrano M.D.    Chief Complaint   Parox atrial flutter    HPI  Ronny Castelan is a 79 y.o. male admitted 7/28/2019 with with weakness and shortness of breath.  Initially he is found to be hyperkalemic and volume overload.  He was hypotensive in the ICU and underwent pericardiocentesis for tamponade.  He was given K Centra, FFP and vitamin K for an INR 3.4.  Postprocedure he remained hypotensive and on pressor support.    He has been followed most recently by Dr. Hoang in clinic.  Has had atrial flutter with prior cardioversion June 2019.  He was referred to EP as an outpatient but was never actually established.    On telemetry, he appears to be in atrial flutter with rates in the 100s to 140s.    Has known coronary disease with no prior interventions.  Has end-stage renal disease requiring hemodialysis.  Has moderate aortic stenosis.  Has hypertension, hyperlipidemia, peripheral arterial disease.  Has nonischemic cardiomyopathy, EF 30% but more recently this admission EF 55%.  This admission, he had uremic pericardial effusion which was partially bloody and drained on 8/1/2019 with 1300 cc removed  Has been on amiodarone, warfarin and metoprolol as an outpatient but only on amiodarone for a few weeks it is not clear if he is fully loaded    Denies chest pain.  Denies dizziness or lightheadedness.  Denies limiting shortness of breath.  Does endorse some fatigue.    Everyone has had difficulty getting accurate blood pressure for some time with him.  Often dialysis his blood pressure reads low but they continue to do dialysis as long as he is mentating appropriately.    He and his wife are nervous about re-challenging with warfarin.  They would like to consider another agent.    Interim Events  Tele-Atrial flutter  Hgb 8.7  plt 118   cr 8.09  K 4.6  Bp elevated this morning, was lower yesterday  Has been on amiodarone  at home with metoprolol and warfarin.  Continues on amiodarone with metoprolol.  Blood pressure has been low so is required Midrin.  Primary prevention pravastatin.  Currently on prednisone.    Review of Systems  Review of Systems   Constitutional: Negative for chills and fever.   Endocrine: Negative for cold intolerance and heat intolerance.       Vital signs in last 24 hours  Temp:  [35.9 °C (96.7 °F)-36.9 °C (98.4 °F)] 35.9 °C (96.7 °F)  Pulse:  [109-129] 109  Resp:  [20-23] 22  BP: ()/(52-73) 111/59  SpO2:  [94 %-100 %] 98 %    Physical Exam  Physical Exam     General: No acute distress.  Chronically ill-appearing  HEENT: EOM grossly intact, no scleral icterus, no pharyngeal erythema.   Neck:  No JVD at 90, no bruits, trachea midline  CVS: Fast, regular. Normal S1, S2.  2 out of 6 murmur at the apex no LE edema.  2+ radial pulses, 1+ PT pulses, tunneled dialysis catheter right chest  Resp: Coarse breath sounds at the bases bilaterally normal respiratory effort.  Abdomen: Soft, NT, no fabiana hepatomegaly.  MSK/Ext: No clubbing or cyanosis.  Skin: Warm and dry, no rashes.  Neurological: CN III-XII grossly intact. No focal deficits.   Psych: A&O x 3, appropriate affect, good judgement      Lab Review  Lab Results   Component Value Date/Time    WBC 12.3 (H) 08/09/2019 05:45 AM    RBC 2.76 (L) 08/09/2019 05:45 AM    HEMOGLOBIN 8.7 (L) 08/09/2019 05:45 AM    HEMATOCRIT 28.1 (L) 08/09/2019 05:45 AM    .8 (H) 08/09/2019 05:45 AM    MCH 31.5 08/09/2019 05:45 AM    MCHC 31.0 (L) 08/09/2019 05:45 AM    MPV 11.0 08/09/2019 05:45 AM      Lab Results   Component Value Date/Time    SODIUM 134 (L) 08/09/2019 05:45 AM    POTASSIUM 4.6 08/09/2019 05:45 AM    CHLORIDE 96 08/09/2019 05:45 AM    CO2 23 08/09/2019 05:45 AM    GLUCOSE 107 (H) 08/09/2019 05:45 AM    BUN 81 (HH) 08/09/2019 05:45 AM    CREATININE 8.09 (HH) 08/09/2019 05:45 AM    CREATININE 2.1 (H) 05/06/2009 10:08 AM      Lab Results   Component Value  Date/Time    ASTSGOT 13 08/09/2019 05:45 AM    ALTSGPT 17 08/09/2019 05:45 AM     Lab Results   Component Value Date/Time    CHOLSTRLTOT 90 (L) 06/22/2017 07:55 AM    LDL 26 06/22/2017 07:55 AM    HDL 57 06/22/2017 07:55 AM    TRIGLYCERIDE 37 06/22/2017 07:55 AM    TROPONINT 197 (H) 07/29/2019 11:39 AM       No results for input(s): NTPROBNP in the last 72 hours.    Cardiac Imaging and Procedures Review  EKG:  My personal interpretation of the EKG dated 8/3/2019 is sinus, rate 86, right bundle branch block with left anterior fascicular block    CARDIAC STUDIES AND PROCEDURES:       CTA OF CHEST (07/30/19)  No CT evidence of acute inflammatory process in the abdomen or pelvis  Very large pericardial effusion has increased from 2016  Small right pleural effusion has enlarged  Aortic valvular calcifications indicating aortic stenosis. There is also severe coronary disease  Atrophic kidneys with cysts. Hepatic cysts are also seen.   Moderate distal colonic diverticulosis without evidence of diverticulosis    Echo 8/2/2019   Compared to the images of the study done 8/1/19 - there has been resolution of pericardial effusion.  Left ventricular ejection fraction is visually estimated to be 55%.  Moderate to severe aortic stenosis.Vmax is 3.50  m/s.  Transvalvular gradients are - Peak: 49 mmHg, Mean:  30 mmHg.  Dimensionless index is 0.2.  Right ventricular systolic pressure is estimated to be 55 mmHg.     ECHOCARDIOGRAM CONCLUSIONS (08/01/19)  Intraoperative study performed during pericardiocentesis. Significant   pericardial effusion seen.  (study result reviewed)     ECHOCARDIOGRAM CONCLUSIONS (07/31/19)  Large pericardial effusion with evidence of hemodynamic compromise.   Significant inflow variation is noted across the mitral valve. No clear   RA/RV diastolic collapse. IVC is plethoric. Concern for possible tamponade.       ECHOCARDIOGRAM CONCLUSIONS (08/07/18)  Normal left ventricular chamber size.   Moderate  concentric left ventricular hypertrophy (LVPW 1.3 cm).   Mildly reduced left ventricular systolic function.   Left ventricular ejection fraction is visually estimated to be 55%.   Moderate pericardial effusion without evidence of hemodynamic compromise.  Moderate aortic stenosis.  Compared to the images of the prior study done 5/2017-    there has been no significant change in the degree of AS or pericardial effusion. LA size has increased.      EKG performed on (08/01/19) was reviewed: EKG shows atrial flutter.  EKG performed on (07/30/2019) EKG shows atrial flutter with 2:1 block and right bundle-branch block.     TRANSESOPHAGEAL ECHOCARDIOGRAM CONCLUSIONS by Harvinder Almonte (06/13/19)  No thrombus detected in the left atrial appendage or other atrial   structures.  Moderately reduced left ventricular systolic function.  Left ventricular ejection fraction is visually estimated to be 30%.  Global hypokinesis.  Diastolic function is difficult to assess with atrial fibrillation.  Reduced right ventricular systolic function.  Mild mitral regurgitation.  Moderate aortic stenosis.  Mild aortic insufficiency.  Moderate pericardial effusion WITHOUT evidence of hemodynamic compromise.  Compared to the images of the transthoracic echocardiogram dated   8/7/2018, the ejection fraction is further reduced previously low   normal.  It is difficult to compare the size of the pericardial   effusion due to technique but it was previously large.    Pericardiocentesis 8/1/2019 1300 cc of hemorrhagic pericardial fluid removed, pigtail catheter left    Imaging  Chest X-Ray: 8/9/2019  1.  Cardiac silhouette enlargement.  2.  Hypoaeration changes versus mild vascular congestion.  3.  New right IJ dialysis catheter.    4.  Pneumothorax.    Stress Test: 12/9/2014  Normal perfusion, EF 66%    Assessment/Plan  -Pericardial effusion, s/p drain  -Parox atrial flutter  -Hypertension  -Recent warfarin for chronic  anticoagulation  -Moderate aortic stenosis  -Prior dilated cardiomyopathy, nonischemic, EF of 30%, recently 55%  -Hypertension, now hypotensive  -Hyperlipidemia  -Peripheral arterial disease  -Right bundle branch block, left anterior fascicular block  -COPD  -End-stage renal disease requiring hemodialysis      Plan:  -Limited echo to reevaluate for pericardial effusion  -EKG  -Add digoxin to metoprolol and amiodarone  -Increase amiodarone from 200 daily to 200 twice daily for a few days  -Continue Midodrine as needed  -Agree with PRN Midodrine  -I will asked nephrology if they would consider apixaban use for him in place of warfarin  -He would need to be on blood thinner and medically stable in our office before he can have ablation, it cannot be done emergently.  -Monitor atrial stenosis and outpatient for possible eventual TAVR    Discussed with Dr. Serrano    Thank you for allowing me to participate in the care of this patient.  I will continue to follow this patient    Please contact me with any questions.    Juanita Pace M.D.   Cardiologist, [facility]  [phone]

## 2019-08-09 NOTE — PROGRESS NOTES
[facility] Medicine Daily Progress Note    Date of Service  8/9/2019    Chief Complaint  79 y.o. male admitted 7/28/2019 with weakness and SOB    Hospital Course    Hx ESRD on HD, Systolic CHF, AFib/Flttr, COPD, HLD, HTN, PVD, CAD, AC on coumadin, distant Hx of pericardial effusion.  Presented with above and in ED found to be hyperkalemic, in volume overload and had emergent HD.  CT done on 7/30 for abd pain showing large pericardial effusion.  Came to the ICU on 8/1 with hypotension.  Echo done showing tamponade physiology and went to cath lab for pericardial drain.  1300ml out on placement.  Prior to the procedure he was given KCentra, FFP and Vit K for INR of 3.4.    Post procedure remained hypotensive and on pressors        Interval Problem [place]  Patient seen and examined today. ICU Care  Care and plan discussed in IDT/Hot rounds.  Lines and assistive devices reviewed.    Patient tolerating treatment and therapies.  All Data, Medication data reviewed.  Case discussed with nursing as available.  Plan of Care reviewed with patient and notified of changes.  8/6 patient with significant bilateral foot and leg pain, right greater than left, alert and oriented x4, scheduled for fistula creation and temporary hemodialysis catheter placement, wife concerned about right foot pain, arterial Doppler ordered, results discussed with vascular surgery, Dr. Peters, denies fever, no dizziness, no blood pressure readings available  8/7 the patient feels better, status post right upper arm fistula creation and permacath placement.  The patient is concerned about urinary urge, his heart rate is high, he has not been on his beta-blocker, he does complain of right toe pain question of gout, his blood pressure prior to dialysis and during dialysis was labile and required pressors.  Explained plan of care  8/8 the patient feels better, continues to be tachycardic, a flutter, A. fib into sinus rhythm and improved heart rate on  beta-blocker, tolerated hemodialysis yesterday well, his urinary complaint is improved, the patient requests a cardiology follow-up and evaluation for possible ablation that was planned.  Wife is concerned about his high heart rate  8/9 the patient feels better, worried about his tachycardia, status post hemodialysis, blood pressure is improved, and uric, discussion held with cardiology in terms of his rate control and anticoagulation.  The patient questioning if you would be a candidate for Eliquis which I denied secondary to his hemodialysis dependent renal failure.  His fistula site and permacath site appears benign.  Consultants/Specialty  Cardiology  Thoracic Surgery  Pulmonology  Vascular surgery  Code Status  Full code    Disposition  ICU    Review of Systems  Review of Systems   Constitutional: Negative for chills and fever.   HENT: Negative for nosebleeds and sore throat.    Eyes: Negative for blurred vision and double vision.   Respiratory: Negative for cough and shortness of breath.    Cardiovascular: Negative for chest pain, palpitations and leg swelling.   Gastrointestinal: Negative for abdominal pain, diarrhea, nausea and vomiting.   Genitourinary: Negative for dysuria and urgency.   Musculoskeletal: Positive for joint pain and myalgias. Negative for back pain.        B foot pain   Skin: Negative for rash.   Neurological: Negative for dizziness, loss of consciousness and headaches.   Psychiatric/Behavioral: The patient is nervous/anxious.         Physical Exam  Temp:  [35.9 °C (96.7 °F)-36.9 °C (98.4 °F)] 35.9 °C (96.7 °F)  Pulse:  [109-129] 109  Resp:  [20-23] 22  BP: ()/(52-73) 111/59  SpO2:  [94 %-100 %] 98 %    Physical Exam   Constitutional: He is oriented to person, place, and time. He appears well-developed and well-nourished. No distress.   HENT:   Head: Normocephalic and atraumatic.   Nose: Nose normal.   Mouth/Throat: Oropharynx is clear and moist.   Eyes: Conjunctivae are normal.    Neck: No JVD present.   Cardiovascular: An irregular rhythm present. Tachycardia present. Exam reveals no gallop.   Murmur heard.  Pulmonary/Chest: Effort normal. No stridor. No respiratory distress. He has no wheezes. He has no rales.   Abdominal: Soft. There is no tenderness. There is no rebound and no guarding.   Musculoskeletal: He exhibits no edema.   Fistula L arm, no thrill  Your right upper extremity fistula healing     Neurological: He is alert and oriented to person, place, and time.   Skin: Skin is warm and dry. No rash noted. He is not diaphoretic.   Psychiatric: He has a normal mood and affect. His behavior is normal. Judgment and thought content normal.   Nursing note and vitals reviewed.      Fluids    Intake/Output Summary (Last 24 hours) at 8/9/2019 0755  Last data filed at 8/9/2019 0600  Gross per 24 hour   Intake 720 ml   Output --   Net 720 ml       Laboratory  Recent Labs     08/07/19 0400 08/08/19 0410 08/09/19  0545   WBC 10.2 7.9 12.3*   RBC 2.92* 2.73* 2.76*   HEMOGLOBIN 9.1* 8.3* 8.7*   HEMATOCRIT 29.3* 27.7* 28.1*   .3* 101.5* 101.8*   MCH 31.2 30.4 31.5   MCHC 31.1* 30.0* 31.0*   RDW 60.6* 61.4* 62.1*   PLATELETCT 123* 105* 118*   MPV 10.1 10.5 11.0     Recent Labs     08/07/19 0400 08/08/19 0410 08/09/19  0545   SODIUM 137 136 134*   POTASSIUM 5.0 4.5 4.6   CHLORIDE 97 99 96   CO2 22 25 23   GLUCOSE 96 132* 107*   BUN 90* 64* 81*   CREATININE 7.83* 6.11* 8.09*   CALCIUM 9.1 9.1 9.4                   Imaging  DX-CHEST-LIMITED (1 VIEW)   Final Result      1.  Cardiac silhouette enlargement.   2.  Hypoaeration changes versus mild vascular congestion.   3.  New right IJ dialysis catheter.      4.  Pneumothorax.      DX-PORTABLE FLUOROSCOPY < 1 HOUR   Final Result      Single intraoperative image intended for localization and not for diagnostic purposes. CT procedure report for details.   Fluoroscopy Time:  0.03 seconds.  1 fluoroscopic images were obtained.      US-EXTREMITY  ARTERY LOWER UNILAT RIGHT   Final Result      US-HEMODIALYSIS ACCESS CREATION DUPLEX UNILAT RIGHT   Final Result      US-HEMODIALYSIS GRAFT DUPLEX COMP UPPER EXTREMITY   Final Result      EC-ECHOCARDIOGRAM COMPLETE W/O CONT   Final Result      EC-ECHOCARDIOGRAM LTD W/O CONT   Final Result      DX-CHEST-LIMITED (1 VIEW)   Final Result      1.  No evidence of pneumothorax following RIGHT neck catheter placement   2.  Persistently enlarged cardiac silhouette   3.  Atherosclerosis      EC-ECHOCARDIOGRAM LTD W/O CONT   Final Result      CT-ABDOMEN-PELVIS W/O   Final Result      No CT evidence of acute inflammatory process in the abdomen or pelvis      Very large pericardial effusion has increased from 2016      Small right pleural effusion has enlarged      Aortic valvular calcifications indicating aortic stenosis. There is also severe coronary disease      Atrophic kidneys with cysts. Hepatic cysts are also seen.      Moderate distal colonic diverticulosis without evidence of diverticulitis      US-HEMODIALYSIS GRAFT DUPLEX COMP UPPER EXTREMITY   Final Result      [id number] VIEW   Final Result      1.  No evidence of bowel obstruction.   2.  Abdominal aortic and mesenteric vascular calcifications.      DX-CHEST-PORTABLE (1 VIEW)   Final Result      1.  Stable cardiomegaly.   2.  No acute abnormality.      CL-PERICARDIOCENTESIS    (Results Pending)   IR-CVC NON TUNNELED > AGE 5    (Results Pending)        Assessment/Plan  * Pericardial tamponade- (present on admission)  Assessment & Plan  S/p drain of 1300ml bloody fluid  ?coumadin vs uremic      Chronic anticoagulation- (present on admission)  Assessment & Plan  Reversed, secondary to pericardial effusion  Not yet restarted secondary to bloody pericardial effusion  Discussed with cardiology for follow-up  Consider rechecking limited echo    Aortic stenosis- (present on admission)  Assessment & Plan  May be a TAVR candidate at some point if we can resolve his current  acute issues    Hypotension  Assessment & Plan  Overall improved  Now more accurate blood pressure readings as he can have BP cuff in the left upper extremity  Continue midodrine    Essential hypertension- (present on admission)  Assessment & Plan  Unstable BP with hypotensive episodes , likely due to Pericardial tamponade.   Not an acute issue  Hold antihypertensives.     PAD (peripheral artery disease) ([facility])  Assessment & Plan  Significant compromise in the lower extremities, likely right greater than left  Arterial Doppler noted  Vascular surgery notified    Dyslipidemia- (present on admission)  Assessment & Plan  On statin therapy.  Monitor.     Elevated transaminase level  Assessment & Plan  ?secondary to hepatic congestion  Has been trending down      Shock ([facility])  Assessment & Plan  Pump vs BRITTON vs less likely sepsis or volume  Suspect due to BRITTON:  Midodrine   Repeat Echo showing relatively good function    Paroxysmal atrial fibrillation ([facility])- (present on admission)  Assessment & Plan  With  A flutter   AC has been reversed  Continue amiodarone  Plan was for ablation with Dr. 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   Hold diuretics for now due to low BP        Hyperkalemia, diminished renal excretion- (present on admission)  Assessment & Plan  Post emergent hemodialysis   Monitor tele   Ace inh stopped  Low k diet.       Anemia in CKD (chronic kidney disease)- (present on admission)  Assessment & Plan  No symptoms of bleeding.   Monitor Hgb  Transfuse if needed for hemoglobin less than 7 gm/dL      COPD (chronic  obstructive pulmonary disease) (HCC)- (present on admission)  Assessment & Plan  Without current exacerbation.   Respiratory therapy as needed.   Continue scheduled Flovent, As needed Xopenex with scheduled Ellipta  Rt protocols      BPH (benign prostatic hypertrophy)- (present on admission)  Assessment & Plan  Continue with Flomax.  Monitor for urinary retention symptoms.  Plan  Cardiology eval in regards to pericardial effusion and antiarrhythmic therapy versus ablation  Pain control, improved currently  Monitor electrolytes closely  Continue with antiarrhythmic therapy, restart beta-blocker titrate upwards, start low-dose digoxin with caution secondary to his renal failure  Limited echo to reevaluate for effusion   restart Flomax  Prednisone for gout pain, symptomatic care  Medically complex high risk  See orders  Discussed with intensivist and cardiology  VTE prophylaxis: none as pericardial fluid bloody  I have performed a physical exam and reviewed and updated ROS and Plan today . In review of yesterday's note , there are no changes except as documented above.

## 2019-08-09 NOTE — PROGRESS NOTES
Hd treatment ordered by Dr Najjar started at 1111 and ended at 1411 with net uf of 1500 ml as bp tolerated. Initially, BP has beed low, albumin was given for bp support as ordered. See flow sheet for details.

## 2019-08-09 NOTE — OR NURSING
1201 Patient arrived to unit, awake and alert.  Access site clean, dry and soft.  Patient denies pain at this time.  Updated on plan of care, verbalized understanding.  1230 Patient resting in gurney access site clean, dry and soft.  Wife at bedside.  1400 Scant amount of oozing noted from right groin, remains soft.  Sandbag applied.  Patient re-educated on groin precautions.  1445 Dr. Cason updated on patient status, spoke with patient and patient's wife.  Wife anxious at this time, reassured of patient's safety and plan of care.  Per Dr. Cason ok for patient to discharge home an hour after bleeding has stopped.  1500 Dressing changed to right groin, no active bleeding noted.  1605 Right groin clean, dry and soft.  Head of bed elevated.  1630 Patient ambulated, no bleeding to groin, remains soft.  SBP improved 151/57.  1650 All lines and monitors discontinued. Reviewed discharge paperwork with pt and wife. Discussed diet, activity, medications, follow up care and worsening symptoms. No questions at this time.  Pt discharged home with wife via wheelchair by CNA.   Continue cpap at 10  Patient noted to have some bilateral wheezing and patient will be started on Solu-Medrol 20 milligram IV q 8 hours  Possible transition to p o  Steroids  Continue duo nebs, Tussionex  Patient is on trelegy at home

## 2019-08-10 ENCOUNTER — APPOINTMENT (OUTPATIENT)
Dept: CARDIOLOGY | Facility: MEDICAL CENTER | Age: 79
DRG: 628 | End: 2019-08-10
Attending: HOSPITALIST
Payer: MEDICARE

## 2019-08-10 LAB
ALBUMIN SERPL BCP-MCNC: 3.3 G/DL (ref 3.2–4.9)
ALBUMIN/GLOB SERPL: 1.1 G/DL
ALP SERPL-CCNC: 147 U/L (ref 30–99)
ALT SERPL-CCNC: 10 U/L (ref 2–50)
ANION GAP SERPL CALC-SCNC: 16 MMOL/L (ref 0–11.9)
ANISOCYTOSIS BLD QL SMEAR: ABNORMAL
AST SERPL-CCNC: 20 U/L (ref 12–45)
BASOPHILS # BLD AUTO: 0.1 % (ref 0–1.8)
BASOPHILS # BLD: 0.01 K/UL (ref 0–0.12)
BILIRUB SERPL-MCNC: 0.6 MG/DL (ref 0.1–1.5)
BUN SERPL-MCNC: 57 MG/DL (ref 8–22)
BURR CELLS BLD QL SMEAR: NORMAL
CALCIUM SERPL-MCNC: 9.3 MG/DL (ref 8.5–10.5)
CHLORIDE SERPL-SCNC: 98 MMOL/L (ref 96–112)
CO2 SERPL-SCNC: 21 MMOL/L (ref 20–33)
COMMENT 1642: NORMAL
CREAT SERPL-MCNC: 5.83 MG/DL (ref 0.5–1.4)
DIGOXIN SERPL-MCNC: 0.6 NG/ML (ref 0.8–2)
EOSINOPHIL # BLD AUTO: 0.02 K/UL (ref 0–0.51)
EOSINOPHIL NFR BLD: 0.3 % (ref 0–6.9)
ERYTHROCYTE [DISTWIDTH] IN BLOOD BY AUTOMATED COUNT: 62.2 FL (ref 35.9–50)
GLOBULIN SER CALC-MCNC: 2.9 G/DL (ref 1.9–3.5)
GLUCOSE SERPL-MCNC: 77 MG/DL (ref 65–99)
HCT VFR BLD AUTO: 32 % (ref 42–52)
HGB BLD-MCNC: 9.5 G/DL (ref 14–18)
IMM GRANULOCYTES # BLD AUTO: 0.09 K/UL (ref 0–0.11)
IMM GRANULOCYTES NFR BLD AUTO: 1.1 % (ref 0–0.9)
INR PPP: 1.12 (ref 0.87–1.13)
LG PLATELETS BLD QL SMEAR: NORMAL
LV EJECT FRACT  99904: 65
LYMPHOCYTES # BLD AUTO: 0.31 K/UL (ref 1–4.8)
LYMPHOCYTES NFR BLD: 3.9 % (ref 22–41)
MACROCYTES BLD QL SMEAR: ABNORMAL
MAGNESIUM SERPL-MCNC: 1.8 MG/DL (ref 1.5–2.5)
MCH RBC QN AUTO: 30.4 PG (ref 27–33)
MCHC RBC AUTO-ENTMCNC: 29.7 G/DL (ref 33.7–35.3)
MCV RBC AUTO: 102.2 FL (ref 81.4–97.8)
MONOCYTES # BLD AUTO: 0.53 K/UL (ref 0–0.85)
MONOCYTES NFR BLD AUTO: 6.7 % (ref 0–13.4)
MORPHOLOGY BLD-IMP: NORMAL
NEUTROPHILS # BLD AUTO: 6.91 K/UL (ref 1.82–7.42)
NEUTROPHILS NFR BLD: 87.9 % (ref 44–72)
NRBC # BLD AUTO: 0 K/UL
NRBC BLD-RTO: 0 /100 WBC
OVALOCYTES BLD QL SMEAR: NORMAL
PHOSPHATE SERPL-MCNC: 3.3 MG/DL (ref 2.5–4.5)
PLATELET # BLD AUTO: 134 K/UL (ref 164–446)
PLATELET BLD QL SMEAR: NORMAL
PMV BLD AUTO: 11 FL (ref 9–12.9)
POIKILOCYTOSIS BLD QL SMEAR: NORMAL
POTASSIUM SERPL-SCNC: 5 MMOL/L (ref 3.6–5.5)
PROT SERPL-MCNC: 6.2 G/DL (ref 6–8.2)
PROTHROMBIN TIME: 14.7 SEC (ref 12–14.6)
RBC # BLD AUTO: 3.13 M/UL (ref 4.7–6.1)
RBC BLD AUTO: PRESENT
SODIUM SERPL-SCNC: 135 MMOL/L (ref 135–145)
WBC # BLD AUTO: 7.9 K/UL (ref 4.8–10.8)

## 2019-08-10 PROCEDURE — 93308 TTE F-UP OR LMTD: CPT

## 2019-08-10 PROCEDURE — 700102 HCHG RX REV CODE 250 W/ 637 OVERRIDE(OP): Performed by: INTERNAL MEDICINE

## 2019-08-10 PROCEDURE — 770020 HCHG ROOM/CARE - TELE (206)

## 2019-08-10 PROCEDURE — 700111 HCHG RX REV CODE 636 W/ 250 OVERRIDE (IP): Performed by: HOSPITALIST

## 2019-08-10 PROCEDURE — 85610 PROTHROMBIN TIME: CPT

## 2019-08-10 PROCEDURE — A9270 NON-COVERED ITEM OR SERVICE: HCPCS | Performed by: INTERNAL MEDICINE

## 2019-08-10 PROCEDURE — 700102 HCHG RX REV CODE 250 W/ 637 OVERRIDE(OP): Performed by: HOSPITALIST

## 2019-08-10 PROCEDURE — 93308 TTE F-UP OR LMTD: CPT | Mod: 26 | Performed by: INTERNAL MEDICINE

## 2019-08-10 PROCEDURE — 84100 ASSAY OF PHOSPHORUS: CPT

## 2019-08-10 PROCEDURE — A9270 NON-COVERED ITEM OR SERVICE: HCPCS | Performed by: HOSPITALIST

## 2019-08-10 PROCEDURE — 83735 ASSAY OF MAGNESIUM: CPT

## 2019-08-10 PROCEDURE — 99232 SBSQ HOSP IP/OBS MODERATE 35: CPT | Performed by: INTERNAL MEDICINE

## 2019-08-10 PROCEDURE — 99233 SBSQ HOSP IP/OBS HIGH 50: CPT | Performed by: HOSPITALIST

## 2019-08-10 PROCEDURE — 80053 COMPREHEN METABOLIC PANEL: CPT

## 2019-08-10 PROCEDURE — 80162 ASSAY OF DIGOXIN TOTAL: CPT

## 2019-08-10 PROCEDURE — 36415 COLL VENOUS BLD VENIPUNCTURE: CPT

## 2019-08-10 PROCEDURE — 85025 COMPLETE CBC W/AUTO DIFF WBC: CPT

## 2019-08-10 RX ORDER — WARFARIN SODIUM 3 MG/1
3 TABLET ORAL
Status: COMPLETED | OUTPATIENT
Start: 2019-08-10 | End: 2019-08-10

## 2019-08-10 RX ADMIN — MIDODRINE HYDROCHLORIDE 15 MG: 5 TABLET ORAL at 17:30

## 2019-08-10 RX ADMIN — UMECLIDINIUM BROMIDE AND VILANTEROL TRIFENATATE 1 PUFF: 62.5; 25 POWDER RESPIRATORY (INHALATION) at 06:22

## 2019-08-10 RX ADMIN — MIDODRINE HYDROCHLORIDE 15 MG: 5 TABLET ORAL at 12:59

## 2019-08-10 RX ADMIN — PRAVASTATIN SODIUM 20 MG: 20 TABLET ORAL at 21:13

## 2019-08-10 RX ADMIN — OMEPRAZOLE 40 MG: 20 CAPSULE, DELAYED RELEASE ORAL at 06:23

## 2019-08-10 RX ADMIN — Medication 2001 MG: at 12:59

## 2019-08-10 RX ADMIN — AMIODARONE HYDROCHLORIDE 200 MG: 200 TABLET ORAL at 06:23

## 2019-08-10 RX ADMIN — Medication 2001 MG: at 17:30

## 2019-08-10 RX ADMIN — Medication 2001 MG: at 08:25

## 2019-08-10 RX ADMIN — METOPROLOL TARTRATE 50 MG: 50 TABLET ORAL at 06:23

## 2019-08-10 RX ADMIN — FLUTICASONE PROPIONATE 88 MCG: 44 AEROSOL, METERED RESPIRATORY (INHALATION) at 06:00

## 2019-08-10 RX ADMIN — PREDNISONE 20 MG: 20 TABLET ORAL at 06:23

## 2019-08-10 RX ADMIN — TAMSULOSIN HYDROCHLORIDE 0.4 MG: 0.4 CAPSULE ORAL at 08:25

## 2019-08-10 RX ADMIN — METOPROLOL TARTRATE 50 MG: 50 TABLET ORAL at 17:30

## 2019-08-10 RX ADMIN — AMIODARONE HYDROCHLORIDE 200 MG: 200 TABLET ORAL at 17:30

## 2019-08-10 RX ADMIN — OXYCODONE HYDROCHLORIDE 2.5 MG: 5 TABLET ORAL at 08:32

## 2019-08-10 RX ADMIN — MIDODRINE HYDROCHLORIDE 15 MG: 5 TABLET ORAL at 08:25

## 2019-08-10 RX ADMIN — WARFARIN SODIUM 3 MG: 3 TABLET ORAL at 22:42

## 2019-08-10 ASSESSMENT — ENCOUNTER SYMPTOMS
BACK PAIN: 0
NERVOUS/ANXIOUS: 0
VOMITING: 0
FEVER: 0
NAUSEA: 0
CHILLS: 0
WHEEZING: 0
CHEST TIGHTNESS: 0
SHORTNESS OF BREATH: 0
PALPITATIONS: 0
BLURRED VISION: 0
CHOKING: 0
APNEA: 0
DOUBLE VISION: 0
ABDOMINAL PAIN: 0
STRIDOR: 0
DIZZINESS: 0
MYALGIAS: 1
COUGH: 0
DIARRHEA: 0

## 2019-08-10 NOTE — CARE PLAN
Problem: Safety  Goal: Will remain free from falls  Outcome: PROGRESSING AS EXPECTED  Note:   Non skid footwear in place, call bell and personal items within reach, bed locked in low position, bed exit alarm armed     Problem: Pain Management  Goal: Pain level will decrease to patient's comfort goal  Outcome: PROGRESSING AS EXPECTED  Note:   Patient medicated for pain per MD order, repositioned as needed. Patient reports pain increases with activity. Will monitor pain response.

## 2019-08-10 NOTE — PROGRESS NOTES
Assumed care of PT A&O 4. Pt resting in bed with no signs of labored breathing. On RA (2L at home). Tele monitor in place, cardiac rhythm being monitored. Call light within reach, bed in lowest position, upper bed rails up. Pt was updated on plan of care for the night. Will continue to monitor.     Pt received renal diet tray at 7pm

## 2019-08-10 NOTE — PROGRESS NOTES
Nephrology Daily Progress Note    Date of Service  8/10/2019    Chief Complaint  79 y.o. male with ESRD/HD,admitted 7/28/2019 with worsening SOB, hyperkalemia    Interval Problem Update  Pt is doing better  Awaiting ECHO     Review of Systems  Review of Systems   Constitutional: Negative for chills, fever and malaise/fatigue.   Respiratory: Negative for cough and shortness of breath.    Cardiovascular: Negative for chest pain and leg swelling.   Gastrointestinal: Negative for nausea and vomiting.   Genitourinary: Negative for dysuria, frequency and urgency.        Physical Exam  Temp:  [36.1 °C (97 °F)-36.7 °C (98 °F)] 36.1 °C (97 °F)  Pulse:  [103-115] 108  Resp:  [14-18] 15  BP: ()/(46-61) 104/58  SpO2:  [93 %-99 %] 93 %    Physical Exam   Constitutional: He is oriented to person, place, and time. No distress.   HENT:   Mouth/Throat: No oropharyngeal exudate.   Eyes: No scleral icterus.   Cardiovascular: Normal rate and regular rhythm.   No murmur heard.  Pulmonary/Chest: Effort normal and breath sounds normal. No respiratory distress. He has no wheezes.   Musculoskeletal: He exhibits no edema or deformity.   Neurological: He is alert and oriented to person, place, and time.   Skin: Skin is warm. He is not diaphoretic. No erythema.   Psychiatric: He has a normal mood and affect. His behavior is normal.   Nursing note and vitals reviewed.      Fluids    Intake/Output Summary (Last 24 hours) at 8/10/2019 1302  Last data filed at 8/9/2019 1424  Gross per 24 hour   Intake 800 ml   Output 2300 ml   Net -1500 ml       Laboratory  Recent Labs     08/08/19  0410 08/09/19  0545 08/10/19  0441   WBC 7.9 12.3* 7.9   RBC 2.73* 2.76* 3.13*   HEMOGLOBIN 8.3* 8.7* 9.5*   HEMATOCRIT 27.7* 28.1* 32.0*   .5* 101.8* 102.2*   MCH 30.4 31.5 30.4   MCHC 30.0* 31.0* 29.7*   RDW 61.4* 62.1* 62.2*   PLATELETCT 105* 118* 134*   MPV 10.5 11.0 11.0     Recent Labs     08/08/19  0410 08/09/19  0545 08/10/19  0441   SODIUM 136  134* 135   POTASSIUM 4.5 4.6 5.0   CHLORIDE 99 96 98   CO2 25 23 21   GLUCOSE 132* 107* 77   BUN 64* 81* 57*   CREATININE 6.11* 8.09* 5.83*   CALCIUM 9.1 9.4 9.3         No results for input(s): NTPROBNP in the last 72 hours.        Imaging  DX-CHEST-LIMITED (1 VIEW)   Final Result      1.  Cardiac silhouette enlargement.   2.  Hypoaeration changes versus mild vascular congestion.   3.  New right IJ dialysis catheter.      4.  Pneumothorax.      DX-PORTABLE FLUOROSCOPY < 1 HOUR   Final Result      Single intraoperative image intended for localization and not for diagnostic purposes. CT procedure report for details.   Fluoroscopy Time:  0.03 seconds.  1 fluoroscopic images were obtained.      US-EXTREMITY ARTERY LOWER UNILAT RIGHT   Final Result      US-HEMODIALYSIS ACCESS CREATION DUPLEX UNILAT RIGHT   Final Result      US-HEMODIALYSIS GRAFT DUPLEX COMP UPPER EXTREMITY   Final Result      EC-ECHOCARDIOGRAM COMPLETE W/O CONT   Final Result      EC-ECHOCARDIOGRAM LTD W/O CONT   Final Result      DX-CHEST-LIMITED (1 VIEW)   Final Result      1.  No evidence of pneumothorax following RIGHT neck catheter placement   2.  Persistently enlarged cardiac silhouette   3.  Atherosclerosis      EC-ECHOCARDIOGRAM LTD W/O CONT   Final Result      CT-ABDOMEN-PELVIS W/O   Final Result      No CT evidence of acute inflammatory process in the abdomen or pelvis      Very large pericardial effusion has increased from 2016      Small right pleural effusion has enlarged      Aortic valvular calcifications indicating aortic stenosis. There is also severe coronary disease      Atrophic kidneys with cysts. Hepatic cysts are also seen.      Moderate distal colonic diverticulosis without evidence of diverticulitis      US-HEMODIALYSIS GRAFT DUPLEX COMP UPPER EXTREMITY   Final Result      [id number] VIEW   Final Result      1.  No evidence of bowel obstruction.   2.  Abdominal aortic and mesenteric vascular calcifications.       DX-CHEST-PORTABLE (1 VIEW)   Final Result      1.  Stable cardiomegaly.   2.  No acute abnormality.      CL-PERICARDIOCENTESIS    (Results Pending)   IR-CVC NON TUNNELED > AGE 5    (Results Pending)   EC-ECHOCARDIOGRAM LTD W/O CONT    (Results Pending)         Assessment/Plan  1.ESRD.  2.Hypotensive: Better  3.Electrolytes: Better  4.Anemia: Hemoglobin is worse today  5.AVF malfunction.  Recs:  no need for HD  Renal diet  Daily labs  Renal dose all meds  Avoid nephrotoxins  Prognosis guarded.

## 2019-08-10 NOTE — CARE PLAN
Problem: Safety  Goal: Will remain free from falls  Outcome: PROGRESSING AS EXPECTED   Pt mobility assessed at beginning of shift. Pt is standby assist. Fall precautions in place. Non-slip socks on. Bed in lowest locked position. Bed alarm on. Call light within reach. Pt educated to call for assistance and verbalizes understanding.       Problem: Knowledge Deficit  Goal: Knowledge of disease process/condition, treatment plan, diagnostic tests, and medications will improve  Outcome: PROGRESSING AS EXPECTED   Pt educated about disease process. Reason why medications are taken. And informed about treatment plan.

## 2019-08-10 NOTE — PROGRESS NOTES
[facility] Medicine Daily Progress Note    Date of Service  8/10/2019    Chief Complaint  79 y.o. male admitted 7/28/2019 with weakness and SOB    Hospital Course    Hx ESRD on HD, Systolic CHF, AFib/Flttr, COPD, HLD, HTN, PVD, CAD, AC on coumadin, distant Hx of pericardial effusion.  Presented with above and in ED found to be hyperkalemic, in volume overload and had emergent HD.  CT done on 7/30 for abd pain showing large pericardial effusion.  Came to the ICU on 8/1 with hypotension.  Echo done showing tamponade physiology and went to cath lab for pericardial drain.  1300ml out on placement.  Prior to the procedure he was given KCentra, FFP and Vit K for INR of 3.4.    Post procedure remained hypotensive and on pressors        Interval Problem [place]  Patient seen and examined today  Patient tolerating treatment and therapies.  All Data, Medication data reviewed.  Case discussed with nursing as available.  Plan of Care reviewed with patient and wife and notified of changes.    (Copied from previous note for information purposes)  8/6 patient with significant bilateral foot and leg pain, right greater than left, alert and oriented x4, scheduled for fistula creation and temporary hemodialysis catheter placement, wife concerned about right foot pain, arterial Doppler ordered, results discussed with vascular surgery, Dr. Peters, denies fever, no dizziness, no blood pressure readings available  8/7 the patient feels better, status post right upper arm fistula creation and permacath placement.  The patient is concerned about urinary urge, his heart rate is high, he has not been on his beta-blocker, he does complain of right toe pain question of gout, his blood pressure prior to dialysis and during dialysis was labile and required pressors.  Explained plan of care  8/8 the patient feels better, continues to be tachycardic, a flutter, A. fib into sinus rhythm and improved heart rate on beta-blocker, tolerated  hemodialysis yesterday well, his urinary complaint is improved, the patient requests a cardiology follow-up and evaluation for possible ablation that was planned.  Wife is concerned about his high heart rate  8/9 the patient feels better, worried about his tachycardia, status post hemodialysis, blood pressure is improved, and uric, discussion held with cardiology in terms of his rate control and anticoagulation.  The patient questioning if you would be a candidate for Eliquis which I denied secondary to his hemodialysis dependent renal failure.  His fistula site and permacath site appears benign.    8/10- feeling well, sitting in chair and walked earlier, no CP or SOB, cardiology following, repeat ECHO pending today    Consultants/Specialty  Cardiology  Thoracic Surgery  Pulmonology  Vascular surgery    Code Status  Full code    Disposition  Tele    Review of Systems  Review of Systems   Constitutional: Negative for chills and fever.   Eyes: Negative for blurred vision and double vision.   Respiratory: Negative for cough and shortness of breath.    Cardiovascular: Negative for chest pain, palpitations and leg swelling.   Gastrointestinal: Negative for abdominal pain, diarrhea, nausea and vomiting.   Genitourinary: Negative for dysuria and urgency.   Musculoskeletal: Positive for joint pain and myalgias. Negative for back pain.   Neurological: Negative for dizziness.   Psychiatric/Behavioral: The patient is not nervous/anxious.       Physical Exam  Temp:  [36.1 °C (97 °F)-36.7 °C (98 °F)] 36.1 °C (97 °F)  Pulse:  [103-115] 108  Resp:  [14-18] 15  BP: ()/(46-61) 104/58  SpO2:  [93 %-99 %] 93 %    Physical Exam   Constitutional: He is oriented to person, place, and time. He appears well-developed and well-nourished. No distress.   HENT:   Head: Normocephalic and atraumatic.   Nose: Nose normal.   Mouth/Throat: Oropharynx is clear and moist.   Eyes: Conjunctivae are normal.   Neck: No JVD present.    Cardiovascular: An irregular rhythm present. Tachycardia present. Exam reveals no gallop.   Murmur heard.  Pulmonary/Chest: Effort normal. No stridor. No respiratory distress. He has no wheezes. He has no rales.   Abdominal: Soft. He exhibits no distension. There is no tenderness. There is no rebound.   Musculoskeletal: He exhibits no edema.   Fistula L arm, no thrill  Your right upper extremity fistula healing     Neurological: He is alert and oriented to person, place, and time.   TTP right ankle   Skin: Skin is warm and dry. No rash noted. He is not diaphoretic.   Psychiatric: He has a normal mood and affect. His behavior is normal. Judgment and thought content normal.   Nursing note and vitals reviewed.      Fluids    Intake/Output Summary (Last 24 hours) at 8/10/2019 1320  Last data filed at 8/9/2019 1424  Gross per 24 hour   Intake 800 ml   Output 2300 ml   Net -1500 ml       Laboratory  Recent Labs     08/08/19 0410 08/09/19  0545 08/10/19  0441   WBC 7.9 12.3* 7.9   RBC 2.73* 2.76* 3.13*   HEMOGLOBIN 8.3* 8.7* 9.5*   HEMATOCRIT 27.7* 28.1* 32.0*   .5* 101.8* 102.2*   MCH 30.4 31.5 30.4   MCHC 30.0* 31.0* 29.7*   RDW 61.4* 62.1* 62.2*   PLATELETCT 105* 118* 134*   MPV 10.5 11.0 11.0     Recent Labs     08/08/19 0410 08/09/19  0545 08/10/19  0441   SODIUM 136 134* 135   POTASSIUM 4.5 4.6 5.0   CHLORIDE 99 96 98   CO2 25 23 21   GLUCOSE 132* 107* 77   BUN 64* 81* 57*   CREATININE 6.11* 8.09* 5.83*   CALCIUM 9.1 9.4 9.3                   Imaging  DX-CHEST-LIMITED (1 VIEW)   Final Result      1.  Cardiac silhouette enlargement.   2.  Hypoaeration changes versus mild vascular congestion.   3.  New right IJ dialysis catheter.      4.  Pneumothorax.      DX-PORTABLE FLUOROSCOPY < 1 HOUR   Final Result      Single intraoperative image intended for localization and not for diagnostic purposes. CT procedure report for details.   Fluoroscopy Time:  0.03 seconds.  1 fluoroscopic images were obtained.       US-EXTREMITY ARTERY LOWER UNILAT RIGHT   Final Result      US-HEMODIALYSIS ACCESS CREATION DUPLEX UNILAT RIGHT   Final Result      US-HEMODIALYSIS GRAFT DUPLEX COMP UPPER EXTREMITY   Final Result      EC-ECHOCARDIOGRAM COMPLETE W/O CONT   Final Result      EC-ECHOCARDIOGRAM LTD W/O CONT   Final Result      DX-CHEST-LIMITED (1 VIEW)   Final Result      1.  No evidence of pneumothorax following RIGHT neck catheter placement   2.  Persistently enlarged cardiac silhouette   3.  Atherosclerosis      EC-ECHOCARDIOGRAM LTD W/O CONT   Final Result      CT-ABDOMEN-PELVIS W/O   Final Result      No CT evidence of acute inflammatory process in the abdomen or pelvis      Very large pericardial effusion has increased from 2016      Small right pleural effusion has enlarged      Aortic valvular calcifications indicating aortic stenosis. There is also severe coronary disease      Atrophic kidneys with cysts. Hepatic cysts are also seen.      Moderate distal colonic diverticulosis without evidence of diverticulitis      US-HEMODIALYSIS GRAFT DUPLEX COMP UPPER EXTREMITY   Final Result      [id number] VIEW   Final Result      1.  No evidence of bowel obstruction.   2.  Abdominal aortic and mesenteric vascular calcifications.      DX-CHEST-PORTABLE (1 VIEW)   Final Result      1.  Stable cardiomegaly.   2.  No acute abnormality.      CL-PERICARDIOCENTESIS    (Results Pending)   IR-CVC NON TUNNELED > AGE 5    (Results Pending)   EC-ECHOCARDIOGRAM LTD W/O CONT    (Results Pending)        Assessment/Plan  * Pericardial tamponade- (present on admission)  Assessment & Plan  S/p drain of 1300ml bloody fluid  ?coumadin vs uremic      Chronic anticoagulation- (present on admission)  Assessment & Plan  Reversed, secondary to pericardial effusion  Not yet restarted secondary to bloody pericardial effusion  Discussed with cardiology for follow-up  rechecking limited echo    Aortic stenosis- (present on admission)  Assessment & Plan  May be a  TAVR candidate at some point if we can resolve his current acute issues    Hypotension  Assessment & Plan  Overall improved  Now more accurate blood pressure readings as he can have BP cuff in the left upper extremity  Continue midodrine    Essential hypertension- (present on admission)  Assessment & Plan  Unstable BP with hypotensive episodes , likely due to Pericardial tamponade.   Not an acute issue  Hold antihypertensives.     PAD (peripheral artery disease) ([facility])  Assessment & Plan  Significant compromise in the lower extremities, likely right greater than left  Arterial Doppler noted  Vascular surgery notified    Dyslipidemia- (present on admission)  Assessment & Plan  On statin therapy.  Monitor.     Elevated transaminase level  Assessment & Plan  ?secondary to hepatic congestion  Has been trending down      Shock ([facility])  Assessment & Plan  Pump vs BRITTON vs less likely sepsis or volume  Suspect due to BRITTON:  Midodrine   Repeat Echo showing relatively good function    Paroxysmal atrial fibrillation ([facility])- (present on admission)  Assessment & Plan  With  A flutter   AC has been reversed  Continue amiodarone  Plan was for ablation with Dr. Grissom  Discussed with cardiology        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[facility])  Assessment & Plan  L arm fistula now inactive  Status post creation of right upper extremity fistula  Permacath placement    Uremia- (present on admission)  Assessment & Plan  HD per Nephro    ESRD (end stage renal disease) on dialysis ([facility])- (present on admission)  Assessment & Plan  Post emergent hemodialysis 7/29/2019 and daily HD   Low k, renal diet  Lisinopril stopped.   Hold diuretics for now due to low BP        Hyperkalemia, diminished renal excretion- (present on admission)  Assessment & Plan  Post emergent hemodialysis   Monitor tele   Ace inh stopped  Low k diet.       Anemia in CKD (chronic kidney disease)- (present on admission)  Assessment & Plan  No symptoms of bleeding.   Monitor Hgb  Transfuse if  needed for hemoglobin less than 7 gm/dL      COPD (chronic obstructive pulmonary disease) ([facility])- (present on admission)  Assessment & Plan  Without current exacerbation.   Respiratory therapy as needed.   Continue scheduled Flovent, As needed Xopenex with scheduled Ellipta  Rt protocols      BPH (benign prostatic hypertrophy)- (present on admission)  Assessment & Plan  Continue with Flomax.  Monitor for urinary retention symptoms.    Plan  Monitor electrolytes closely  Limited echo to reevaluate for effusion   restart Flomax  Prednisone for gout pain, symptomatic care  Medically complex high risk    VTE prophylaxis: none as pericardial fluid bloody

## 2019-08-10 NOTE — PROGRESS NOTES
Cardiology Follow Up Progress Note    Date of Service  8/10/2019    Attending Physician  Abdirahman Schwarz M.D.    Chief Complaint     Echo 7/31/19 showed tamponade underwent pericardiocentesis 8/1/19 (  1300 cc bloody )      Hx of flutter s/p DCCV 6/19    HPI  Ronny Castelan is a 79 y.o. male hx of non ischemic CMP , EF 30% ( improved to 55% echo 8/2/19 ), aflutter on coumadin, Amiodarone, and metoprolol, ESRD on HD COPD, hyperlipidemia, hypertension, PVD, admitted 7/28/2019 with dyspnea, lightheadedness, found to be volume overloaded with high K underwent dialysis,  Echo 7/31/19  showed large effusion and possible tamponade, INR was revered, underwent pericardiocentesis 8/1/19. Post procedure needed pressor support for hypotension.    Interim Events  8/9/19 feeling great, no dyspnea, walked [place] with walk[place], flutter rate well controlled, no dyspnea, no pain, good spirit.      Review of Systems  Review of Systems   Respiratory: Negative for apnea, cough, choking, chest tightness, shortness of breath, wheezing and stridor.    Cardiovascular: Positive for leg swelling. Negative for chest pain.       Vital signs in last 24 hours  Temp:  [36.2 °C (97.1 °F)-36.7 °C (98 °F)] 36.2 °C (97.2 °F)  Pulse:  [105-115] 115  Resp:  [14-26] 14  BP: ()/(42-61) 104/57  SpO2:  [95 %-100 %] 95 %    Physical Exam  Physical Exam   Constitutional: He is oriented to person, place, and time.   HENT:   Head: Normocephalic.   Eyes: Conjunctivae are normal.   Neck: No JVD present. No thyromegaly present.   Cardiovascular: Normal rate. A regularly irregular rhythm present.   Pulmonary/Chest: He has no wheezes.   Abdominal: Soft.   Musculoskeletal: He exhibits edema.   Neurological: He is alert and oriented to person, place, and time.   Skin: Skin is warm and dry.   Dialysis catheter        Lab Review  Lab Results   Component Value Date/Time    WBC 7.9 08/10/2019 04:41 AM    RBC 3.13 (L) 08/10/2019 04:41 AM    HEMOGLOBIN 9.5 (L)  08/10/2019 04:41 AM    HEMATOCRIT 32.0 (L) 08/10/2019 04:41 AM    .2 (H) 08/10/2019 04:41 AM    MCH 30.4 08/10/2019 04:41 AM    MCHC 29.7 (L) 08/10/2019 04:41 AM    MPV 11.0 08/10/2019 04:41 AM      Lab Results   Component Value Date/Time    SODIUM 135 08/10/2019 04:41 AM    POTASSIUM 5.0 08/10/2019 04:41 AM    CHLORIDE 98 08/10/2019 04:41 AM    CO2 21 08/10/2019 04:41 AM    GLUCOSE 77 08/10/2019 04:41 AM    BUN 57 (H) 08/10/2019 04:41 AM    CREATININE 5.83 (HH) 08/10/2019 04:41 AM    CREATININE 2.1 (H) 2009 10:08 AM      Lab Results   Component Value Date/Time    ASTSGOT 20 08/10/2019 04:41 AM    ALTSGPT 10 08/10/2019 04:41 AM     Lab Results   Component Value Date/Time    CHOLSTRLTOT 90 (L) 2017 07:55 AM    LDL 26 2017 07:55 AM    HDL 57 2017 07:55 AM    TRIGLYCERIDE 37 2017 07:55 AM    TROPONINT 197 (H) 2019 11:39 AM       No results for input(s): NTPROBNP in the last 72 hours.    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  Left ventricular ejection fraction is visually estimated to be 55%.  Moderate to severe aortic stenosis.Vmax is 3.50  m/s.  Transvalvular gradients are - Peak: 49 mmHg, Mean:  30 mmHg.  Dimensionless index is 0.2.  Right ventricular systolic pressure is estimated to be 55 mmHg.    Pericardiocentesis 19, 1300 cc bloody pericardial  fluid removed    Imaging  Chest X-Ray:  19 no pleural effusion        Assessment/Plan    S/p pericardiocentesis for large uremic effusion based on echo 19  underwent pericardiocentesis 19, 1300 cc bloody fluids were drained   Repeat echo 19 showed resolution of effusion , EF 55%  -Repeat limited echo pending     PAFL  - Amiodarone 200 mg BID  -metorpolol 50 mg BID  -Holding warfarin      Hypertension   -Remained hypotensive post  pericardiocentesis  -required pressors until 8/7/19   -On Midodrine 15 mg TID since 8/7  -BP better controlled   -BP 98/57      Moderate AS  -out patient follow up    LE edema  - compression stockings     F/u on TTE to evaluate for effusion  Out patient follow up for consideration of flutter ablation     Please contact me with any questions.    MAUREEN Espinosa.   Cardiologist, [facility]  [phone]

## 2019-08-11 ENCOUNTER — PATIENT OUTREACH (OUTPATIENT)
Dept: HEALTH INFORMATION MANAGEMENT | Facility: OTHER | Age: 79
End: 2019-08-11

## 2019-08-11 VITALS
OXYGEN SATURATION: 95 % | RESPIRATION RATE: 16 BRPM | WEIGHT: 159.39 LBS | TEMPERATURE: 97.6 F | HEIGHT: 68 IN | BODY MASS INDEX: 24.16 KG/M2 | HEART RATE: 113 BPM | DIASTOLIC BLOOD PRESSURE: 59 MMHG | SYSTOLIC BLOOD PRESSURE: 107 MMHG

## 2019-08-11 LAB
ALBUMIN SERPL BCP-MCNC: 3.7 G/DL (ref 3.2–4.9)
ALBUMIN/GLOB SERPL: 1.3 G/DL
ALP SERPL-CCNC: 167 U/L (ref 30–99)
ALT SERPL-CCNC: 14 U/L (ref 2–50)
ANION GAP SERPL CALC-SCNC: 17 MMOL/L (ref 0–11.9)
ANISOCYTOSIS BLD QL SMEAR: ABNORMAL
AST SERPL-CCNC: 28 U/L (ref 12–45)
BASOPHILS # BLD AUTO: 0.2 % (ref 0–1.8)
BASOPHILS # BLD: 0.04 K/UL (ref 0–0.12)
BILIRUB SERPL-MCNC: 0.7 MG/DL (ref 0.1–1.5)
BUN SERPL-MCNC: 88 MG/DL (ref 8–22)
BURR CELLS BLD QL SMEAR: NORMAL
CALCIUM SERPL-MCNC: 9.7 MG/DL (ref 8.5–10.5)
CHLORIDE SERPL-SCNC: 95 MMOL/L (ref 96–112)
CO2 SERPL-SCNC: 21 MMOL/L (ref 20–33)
COMMENT 1642: NORMAL
CREAT SERPL-MCNC: 8.13 MG/DL (ref 0.5–1.4)
EOSINOPHIL # BLD AUTO: 0.02 K/UL (ref 0–0.51)
EOSINOPHIL NFR BLD: 0.1 % (ref 0–6.9)
ERYTHROCYTE [DISTWIDTH] IN BLOOD BY AUTOMATED COUNT: 65.2 FL (ref 35.9–50)
GLOBULIN SER CALC-MCNC: 2.9 G/DL (ref 1.9–3.5)
GLUCOSE SERPL-MCNC: 85 MG/DL (ref 65–99)
HCT VFR BLD AUTO: 36 % (ref 42–52)
HGB BLD-MCNC: 10.2 G/DL (ref 14–18)
IMM GRANULOCYTES # BLD AUTO: 0.12 K/UL (ref 0–0.11)
IMM GRANULOCYTES NFR BLD AUTO: 0.7 % (ref 0–0.9)
LYMPHOCYTES # BLD AUTO: 0.28 K/UL (ref 1–4.8)
LYMPHOCYTES NFR BLD: 1.5 % (ref 22–41)
MACROCYTES BLD QL SMEAR: ABNORMAL
MAGNESIUM SERPL-MCNC: 1.9 MG/DL (ref 1.5–2.5)
MCH RBC QN AUTO: 30.5 PG (ref 27–33)
MCHC RBC AUTO-ENTMCNC: 28.3 G/DL (ref 33.7–35.3)
MCV RBC AUTO: 107.8 FL (ref 81.4–97.8)
MONOCYTES # BLD AUTO: 0.81 K/UL (ref 0–0.85)
MONOCYTES NFR BLD AUTO: 4.4 % (ref 0–13.4)
MORPHOLOGY BLD-IMP: NORMAL
NEUTROPHILS # BLD AUTO: 17.15 K/UL (ref 1.82–7.42)
NEUTROPHILS NFR BLD: 93.1 % (ref 44–72)
NRBC # BLD AUTO: 0 K/UL
NRBC BLD-RTO: 0 /100 WBC
OVALOCYTES BLD QL SMEAR: NORMAL
PLATELET # BLD AUTO: 139 K/UL (ref 164–446)
PLATELET BLD QL SMEAR: NORMAL
PMV BLD AUTO: 10.9 FL (ref 9–12.9)
POIKILOCYTOSIS BLD QL SMEAR: NORMAL
POTASSIUM SERPL-SCNC: 6.2 MMOL/L (ref 3.6–5.5)
PROT SERPL-MCNC: 6.6 G/DL (ref 6–8.2)
RBC # BLD AUTO: 3.34 M/UL (ref 4.7–6.1)
RBC BLD AUTO: PRESENT
SODIUM SERPL-SCNC: 133 MMOL/L (ref 135–145)
WBC # BLD AUTO: 18.4 K/UL (ref 4.8–10.8)

## 2019-08-11 PROCEDURE — A9270 NON-COVERED ITEM OR SERVICE: HCPCS | Performed by: HOSPITALIST

## 2019-08-11 PROCEDURE — 99232 SBSQ HOSP IP/OBS MODERATE 35: CPT | Performed by: INTERNAL MEDICINE

## 2019-08-11 PROCEDURE — 700102 HCHG RX REV CODE 250 W/ 637 OVERRIDE(OP): Performed by: HOSPITALIST

## 2019-08-11 PROCEDURE — 80053 COMPREHEN METABOLIC PANEL: CPT

## 2019-08-11 PROCEDURE — 700102 HCHG RX REV CODE 250 W/ 637 OVERRIDE(OP): Performed by: INTERNAL MEDICINE

## 2019-08-11 PROCEDURE — 700111 HCHG RX REV CODE 636 W/ 250 OVERRIDE (IP): Performed by: HOSPITALIST

## 2019-08-11 PROCEDURE — 99239 HOSP IP/OBS DSCHRG MGMT >30: CPT | Performed by: HOSPITALIST

## 2019-08-11 PROCEDURE — 36415 COLL VENOUS BLD VENIPUNCTURE: CPT

## 2019-08-11 PROCEDURE — 85025 COMPLETE CBC W/AUTO DIFF WBC: CPT

## 2019-08-11 PROCEDURE — A9270 NON-COVERED ITEM OR SERVICE: HCPCS | Performed by: INTERNAL MEDICINE

## 2019-08-11 PROCEDURE — 83735 ASSAY OF MAGNESIUM: CPT

## 2019-08-11 RX ORDER — MIDODRINE HYDROCHLORIDE 5 MG/1
15 TABLET ORAL
Qty: 90 TAB | Refills: 0 | Status: SHIPPED | OUTPATIENT
Start: 2019-08-11 | End: 2019-09-05

## 2019-08-11 RX ORDER — METOPROLOL TARTRATE 50 MG/1
50 TABLET, FILM COATED ORAL 2 TIMES DAILY
Qty: 60 TAB | Refills: 0 | Status: SHIPPED | OUTPATIENT
Start: 2019-08-11 | End: 2019-09-23 | Stop reason: SDUPTHER

## 2019-08-11 RX ORDER — AMIODARONE HYDROCHLORIDE 200 MG/1
200 TABLET ORAL 2 TIMES DAILY
Qty: 60 TAB | Refills: 0 | Status: SHIPPED | OUTPATIENT
Start: 2019-08-11 | End: 2019-08-22

## 2019-08-11 RX ORDER — TAMSULOSIN HYDROCHLORIDE 0.4 MG/1
0.4 CAPSULE ORAL
Qty: 90 CAP | Refills: 0 | Status: SHIPPED
Start: 2019-08-12 | End: 2020-02-06

## 2019-08-11 RX ADMIN — MIDODRINE HYDROCHLORIDE 15 MG: 5 TABLET ORAL at 13:01

## 2019-08-11 RX ADMIN — PREDNISONE 20 MG: 20 TABLET ORAL at 05:22

## 2019-08-11 RX ADMIN — OMEPRAZOLE 40 MG: 20 CAPSULE, DELAYED RELEASE ORAL at 05:22

## 2019-08-11 RX ADMIN — ACETAMINOPHEN 650 MG: 325 TABLET, FILM COATED ORAL at 08:07

## 2019-08-11 RX ADMIN — METOPROLOL TARTRATE 50 MG: 50 TABLET ORAL at 05:22

## 2019-08-11 RX ADMIN — Medication 2001 MG: at 13:01

## 2019-08-11 RX ADMIN — Medication 2001 MG: at 08:06

## 2019-08-11 RX ADMIN — TAMSULOSIN HYDROCHLORIDE 0.4 MG: 0.4 CAPSULE ORAL at 08:06

## 2019-08-11 RX ADMIN — UMECLIDINIUM BROMIDE AND VILANTEROL TRIFENATATE 1 PUFF: 62.5; 25 POWDER RESPIRATORY (INHALATION) at 05:23

## 2019-08-11 RX ADMIN — MIDODRINE HYDROCHLORIDE 15 MG: 5 TABLET ORAL at 08:06

## 2019-08-11 RX ADMIN — AMIODARONE HYDROCHLORIDE 200 MG: 200 TABLET ORAL at 05:22

## 2019-08-11 RX ADMIN — FLUTICASONE PROPIONATE 88 MCG: 44 AEROSOL, METERED RESPIRATORY (INHALATION) at 05:23

## 2019-08-11 ASSESSMENT — ENCOUNTER SYMPTOMS
FEVER: 0
COUGH: 0
CHILLS: 0
VOMITING: 0
NAUSEA: 0
SHORTNESS OF BREATH: 0

## 2019-08-11 ASSESSMENT — CHA2DS2 SCORE
AGE 65 TO 74: NO
DIABETES: NO
VASCULAR DISEASE: NO
CHF OR LEFT VENTRICULAR DYSFUNCTION: YES
AGE 75 OR GREATER: YES
PRIOR STROKE OR TIA OR THROMBOEMBOLISM: NO
HYPERTENSION: YES
CHA2DS2 VASC SCORE: 4
SEX: MALE

## 2019-08-11 NOTE — PROGRESS NOTES
Inpatient Anticoagulation Service Note    Date: 8/11/2019  Reason for Anticoagulation: Other (Comments), Atrial Fibrillation(Atrial Flutter)   [id number] VASc Score: 4  HAS-BLED Score: 4    Hemoglobin Value: (!) 9.5  Hematocrit Value: (!) 32  Lab Platelet Value: (!) 134  Target INR: 2.0 to 2.5    INR from last 7 days     Date/Time INR Value    08/10/19 1953  1.12    08/04/19 0440  (!) 1.36        Dose from last 7 days     None        Average Dose (mg): 5  Significant Interactions: Proton Pump Inhibitor, Amiodarone  Bridge Therapy: No    Reversal Agent Administered: Not Applicable    Comments: Restart warfarin with INT goal of 2 to 2.5.  Therapy was held and reversal agents administered starting 7/31 due to elevated INR and bleeding.  Home dose per anticoag clinic includes 5 mg daily.  H&H currently stable, & no s/sx of overt bleeding.      Plan:  3 mg po x1, Repeat INR with AM labs on 8/11    Education Material Provided?: No    Pharmacist suggested discharge dosing: To be determined       Enrique Orozco, DanaeD

## 2019-08-11 NOTE — DISCHARGE INSTRUCTIONS
Discharge Instructions    Discharged to home by car with relative. Discharged via wheelchair, hospital escort: Yes.  Special equipment needed: Walker    Be sure to schedule a follow-up appointment with your primary care doctor or any specialists as instructed.     Discharge Plan:   Diet Plan: Discussed  Activity Level: Discussed  Confirmed Follow up Appointment: Appointment Scheduled  Confirmed Symptoms Management: Discussed  Medication Reconciliation Updated: Yes  Influenza Vaccine Indication: Indicated: Not available from distributor/    I understand that a diet low in cholesterol, fat, and sodium is recommended for good health. Unless I have been given specific instructions below for another diet, I accept this instruction as my diet prescription.   Other diet: Renal    Special Instructions: None    · Is patient discharged on Warfarin / Coumadin?   Yes    You are receiving the drug warfarin. Please understand the importance of monitoring warfarin with scheduled PT/INR blood draws.  Follow-up with a call to your personal Doctor's office in 3 days to schedule a PT/INR.   Hyperkalemia  Introduction  Hyperkalemia is when you have too much potassium in your blood. Potassium is normally removed (excreted) from your body by your kidneys. If there is too much potassium in your blood, it can affect how your heart works.  Follow these instructions at home:  · Take medicines only as told by your doctor.  · Do not take any supplements, natural products, herbs, or vitamins unless your doctor says it is okay.  · Limit your alcohol intake as told by your doctor.  · Stop illegal drug use. If you need help quitting, ask your doctor.  · Keep all follow-up visits as told by your doctor. This is important.  · If you have kidney disease, you may need to follow a low potassium diet. A  (dietitian) can help you.  Contact a doctor if:  · Your heartbeat is not regular or very slow.  · You feel dizzy  (light-headed).  · You feel weak.  · You feel sick to your stomach (nauseous).  · You have tingling in your hands or feet.  · You cannot feel your hands or feet.  Get help right away if:  · You are short of breath.  · You have chest pain.  · You pass out (faint).  · You cannot move your muscles.  This information is not intended to replace advice given to you by your health care provider. Make sure you discuss any questions you have with your health care provider.  Document Released: 12/18/2006 Document Revised: 05/25/2017 Document Reviewed: 03/25/2015 © 2017 [employer]    Pericardial Effusion  Pericardial effusion is a buildup of fluid around your heart. The heart is surrounded by a double-layered sac (pericardium). This sac normally contains a small amount of fluid. When too much builds up, it can put pressure on your heart and cause problems. As fluid builds up in the pericardial sac and pressure on your heart increases, it becomes harder for your heart to pump blood. When fluid prevents your heart from pumping enough blood, it is called cardiac tamponade. Cardiac tamponade is a life-threatening condition.  CAUSES   Often the cause of pericardial effusion is not known (idiopathic effusion). Possible causes are from:  · Infections, such as from a virus, bacteria, fungus, or parasite.  · Damage to the pericardium from heart surgery or a heart attack.  · Inflammatory diseases, such as rheumatoid arthritis or lupus.  · Kidney disease.  · Thyroid disease.  · Cancer.  · Cancer treatment, including radiation or chemotherapy.  · Certain drugs, including tuberculosis drugs or seizure drugs.  · Chest trauma.  SIGNS AND SYMPTOMS   Pericardial effusion may not cause symptoms at first, especially if the fluid builds up slowly. In time, pressure on the heart may cause:  · Chest pain.  · Trouble breathing.  · Pain and shortness of breath that is worse when lying down.  · Dizziness.  · Fainting.  · Cough.  · Hiccups.  · Skipped  heartbeats (palpitations).  · Anxiety and confusion.  · A bluish skin color (cyanosis).  · Swollen legs and ankles.  DIAGNOSIS   Your health care provider may suspect pericardial effusion based on your symptoms. Your health care provider may also do a physical exam to check for:  · Low blood pressure and weak pulses.  · Soft (muffled) heart sounds.  · Rapid heartbeat.  · Full veins in your neck (distended jugular veins).  · Decreased breathing sounds and a rubbing sound (friction rub) when listening to your lungs.  Your health care provider may also do several tests to confirm the diagnosis and find out what is causing the pericardial effusion. These may include:  · Chest X-ray.  · Imaging study of the heart using sound waves (echocardiogram).  · CT scan or MRI.  · Electrical study of the heart (electrocardiogram [ECG]).  · A procedure using a needle to remove fluid from the pericardium for examination (pericardiocentesis).  · Blood tests to check for:  ¨ Infection.  ¨ Heart damage.  ¨ Thyroid abnormalities.  ¨ Kidney disease.  ¨ Inflammatory disorders.  TREATMENT   Treatment for pericardial effusion depends on the cause of your symptoms and how severe your symptoms are. If a specific cause was found, that cause will be treated. Treatment may include:  · Medicines, such as:  ¨ Nonsteroidal anti-inflammatory drugs (NSAIDs).  ¨ Other anti-inflammatory drugs, such as steroids.  ¨ Antibiotic medicine.  ¨ Antifungal medicine.  · Hospital treatment may be necessary, such as for cardiac tamponade. This may include:  ¨ Intravenous (IV) fluids.  ¨ Breathing support.  · Surgery may be needed in severe cases. Surgery may include:  ¨ Pericardiocentesis.  ¨ Open heart surgery.  ¨ A procedure to make a permanent opening in the pericardium (pericardial window).  SEEK MEDICAL CARE IF:  · You feel dizzy or light-headed.  · You have swelling in your legs or ankles.  · You have heart palpitations.  · You have persistent cough or  hiccups.  SEEK IMMEDIATE MEDICAL CARE IF:   · You faint.  · You have chest pain.  · You have trouble breathing.  These symptoms may represent a serious problem that is an emergency. Do not wait to see if the symptoms will go away. Get medical help right away. Call your local emergency services  (911 in the [place] Do not drive yourself to the hospital.   MAKE SURE YOU:  · Understand these instructions.  · Will watch your condition.  · Will get help right away if you are not doing well or get worse.  This information is not intended to replace advice given to you by your health care provider. Make sure you discuss any questions you have with your health care provider.  Document Released: 08/15/2006 Document Revised: 01/08/2016 Document Reviewed: 05/07/2015  [employer] Interactive Patient Education © 2017 [employer]  .    IMPORTANT: HOW TO USE THIS INFORMATION:  This is a summary and does NOT have all possible information about this product. This information does not assure that this product is safe, effective, or appropriate for you. This information is not individual medical advice and does not substitute for the advice of your health care professional. Always ask your health care professional for complete information about this product and your specific health needs.      WARFARIN - ORAL (WARF-uh-rin)      COMMON BRAND NAME(S): Coumadin      WARNING:  Warfarin can cause very serious (possibly fatal) bleeding. This is more likely to occur when you first start taking this medication or if you take too much warfarin. To decrease your risk for bleeding, your doctor or other health care provider will monitor you closely and check your lab results (INR test) to make sure you are not taking too much warfarin. Keep all medical and laboratory appointments. Tell your doctor right away if you notice any signs of serious bleeding. See also Side Effects section.      USES:  This medication is used to treat blood clots (such as  "in deep vein thrombosis-DVT or pulmonary embolus-PE) and/or to prevent new clots from forming in your body. Preventing harmful blood clots helps to reduce the risk of a stroke or heart attack. Conditions that increase your risk of developing blood clots include a certain type of irregular heart rhythm (atrial fibrillation), heart valve replacement, recent heart attack, and certain surgeries (such as hip/knee replacement). Warfarin is commonly called a \"blood thinner,\" but the more correct term is \"anticoagulant.\" It helps to keep blood flowing smoothly in your body by decreasing the amount of certain substances (clotting proteins) in your blood.      HOW TO USE:  Read the Medication Guide provided by your pharmacist before you start taking warfarin and each time you get a refill. If you have any questions, ask your doctor or pharmacist. Take this medication by mouth with or without food as directed by your doctor or other health care professional, usually once a day. It is very important to take it exactly as directed. Do not increase the dose, take it more frequently, or stop using it unless directed by your doctor. Dosage is based on your medical condition, laboratory tests (such as INR), and response to treatment. Your doctor or other health care provider will monitor you closely while you are taking this medication to determine the right dose for you. Use this medication regularly to get the most benefit from it. To help you remember, take it at the same time each day. It is important to eat a balanced, consistent diet while taking warfarin. Some foods can affect how warfarin works in your body and may affect your treatment and dose. Avoid sudden large increases or decreases in your intake of foods high in vitamin K (such as broccoli, cauliflower, cabbage, brussels sprouts, kale, spinach, and other green leafy vegetables, liver, green tea, certain vitamin supplements). If you are trying to lose weight, check " with your doctor before you try to go on a diet. Cranberry products may also affect how your warfarin works. Limit the amount of cranberry juice (16 ounces/480 milliliters a day) or other cranberry products you may drink or eat.      SIDE EFFECTS:  Nausea, loss of appetite, or stomach/abdominal pain may occur. If any of these effects persist or worsen, tell your doctor or pharmacist promptly. Remember that your doctor has prescribed this medication because he or she has judged that the benefit to you is greater than the risk of side effects. Many people using this medication do not have serious side effects. This medication can cause serious bleeding if it affects your blood clotting proteins too much (shown by unusually high INR lab results). Even if your doctor stops your medication, this risk of bleeding can continue for up to a week. Tell your doctor right away if you have any signs of serious bleeding, including: unusual pain/swelling/discomfort, unusual/easy bruising, prolonged bleeding from cuts or gums, persistent/frequent nosebleeds, unusually heavy/prolonged menstrual flow, pink/dark urine, coughing up blood, vomit that is bloody or looks like coffee grounds, severe headache, dizziness/fainting, unusual or persistent tiredness/weakness, bloody/black/tarry stools, chest pain, shortness of breath, difficulty swallowing. Tell your doctor right away if any of these unlikely but serious side effects occur: persistent nausea/vomiting, severe stomach/abdominal pain, yellowing eyes/skin. This drug rarely has caused very serious (possibly fatal) problems if its effects lead to small blood clots (usually at the beginning of treatment). This can lead to severe skin/tissue damage that may require surgery or amputation if left untreated. Patients with certain blood conditions (protein C or S deficiency) may be at greater risk. Get medical help right away if any of these rare but serious side effects occur:  painful/red/purplish patches on the skin (such as on the toe, breast, abdomen), change in the amount of urine, vision changes, confusion, slurred speech, weakness on one side of the body. A very serious allergic reaction to this drug is rare. However, get medical help right away if you notice any symptoms of a serious allergic reaction, including: rash, itching/swelling (especially of the face/tongue/throat), severe dizziness, trouble breathing. This is not a complete list of possible side effects. If you notice other effects not listed above, contact your doctor or pharmacist. In the [place] - Call your doctor for medical advice about side effects. You may report side effects to FDA at 8-225-LSX-7885. In [place] - Call your doctor for medical advice about side effects. You may report side effects to [facility] [place] at [phone].      PRECAUTIONS:  Before taking warfarin, tell your doctor or pharmacist if you are allergic to it; or if you have any other allergies. This product may contain inactive ingredients, which can cause allergic reactions or other problems. Talk to your pharmacist for more details. Before using this medication, tell your doctor or pharmacist your medical history, especially of: blood disorders (such as anemia, hemophilia), bleeding problems (such as bleeding of the stomach/intestines, bleeding in the brain), blood vessel disorders (such as aneurysms), recent major injury/surgery, liver disease, alcohol use, mental/mood disorders (including memory problems), frequent falls/injuries. It is important that all your doctors and dentists know that you take warfarin. Before having surgery or any medical/dental procedures, tell your doctor or dentist that you are taking this medication and about all the products you use (including prescription drugs, nonprescription drugs, and herbal products). Avoid getting injections into the muscles. If you must have an injection into a muscle (for example, a flu  shot), it should be given in the arm. This way, it will be easier to check for bleeding and/or apply pressure bandages. This medication may cause stomach bleeding. Daily use of alcohol while using this medicine will increase your risk for stomach bleeding and may also affect how this medication works. Limit or avoid alcoholic beverages. If you have not been eating well, if you have an illness or infection that causes fever, vomiting, or diarrhea for more than 2 days, or if you start using any antibiotic medications, contact your doctor or pharmacist immediately because these conditions can affect how warfarin works. This medication can cause heavy bleeding. To lower the chance of getting cut, bruised, or injured, use great caution with sharp objects like safety razors and nail cutters. Use an electric razor when shaving and a soft toothbrush when brushing your teeth. Avoid activities such as contact sports. If you fall or injure yourself, especially if you hit your head, call your doctor immediately. Your doctor may need to check you. The Food & Drug Administration has stated that generic warfarin products are interchangeable. However, consult your doctor or pharmacist before switching warfarin products. Be careful not to take more than one medication that contains warfarin unless specifically directed by the doctor or health care provider who is monitoring your warfarin treatment. Older adults may be at greater risk for bleeding while using this drug. This medication is not recommended for use during pregnancy because of serious (possibly fatal) harm to an unborn baby. Discuss the use of reliable forms of birth control with your doctor. If you become pregnant or think you may be pregnant, tell your doctor immediately. If you are planning pregnancy, discuss a plan for managing your condition with your doctor before you become pregnant. Your doctor may switch the type of medication you use during pregnancy. Very  "small amounts of this medication may pass into breast milk but is unlikely to harm a nursing infant. Consult your doctor before breast-feeding.      DRUG INTERACTIONS:  Drug interactions may change how your medications work or increase your risk for serious side effects. This document does not contain all possible drug interactions. Keep a list of all the products you use (including prescription/nonprescription drugs and herbal products) and share it with your doctor and pharmacist. Do not start, stop, or change the dosage of any medicines without your doctor's approval. Warfarin interacts with many prescription, nonprescription, vitamin, and herbal products. This includes medications that are applied to the skin or inside the vagina or rectum. The interactions with warfarin usually result in an increase or decrease in the \"blood-thinning\" (anticoagulant) effect. Your doctor or other health care professional should closely monitor you to prevent serious bleeding or clotting problems. While taking warfarin, it is very important to tell your doctor or pharmacist of any changes in medications, vitamins, or herbal products that you are taking. Some products that may interact with this drug include: capecitabine, imatinib, mifepristone. Aspirin, aspirin-like drugs (salicylates), and nonsteroidal anti-inflammatory drugs (NSAIDs such as ibuprofen, naproxen, celecoxib) may have effects similar to warfarin. These drugs may increase the risk of bleeding problems if taken during treatment with warfarin. Carefully check all prescription/nonprescription product labels (including drugs applied to the skin such as pain-relieving creams) since the products may contain NSAIDs or salicylates. Talk to your doctor about using a different medication (such as acetaminophen) to treat pain/fever. Low-dose aspirin and related drugs (such as clopidogrel, ticlopidine) should be continued if prescribed by your doctor for specific medical " reasons such as heart attack or stroke prevention. Consult your doctor or pharmacist for more details. Many herbal products interact with warfarin. Tell your doctor before taking any herbal products, especially bromelains, coenzyme Q10, cranberry, danshen, dong quai, fenugreek, garlic, ginkgo biloba, ginseng, and Adina's wort, among others. This medication may interfere with a certain laboratory test to measure theophylline levels, possibly causing false test results. Make sure laboratory personnel and all your doctors know you use this drug.      OVERDOSE:  If overdose is suspected, contact a poison control center or emergency room immediately. [place] residents can call the [place] National Poison Hotline at 1-691.112.4549. [place] residents can call a provincial poison control center. Symptoms of overdose may include: bloody/black/tarry stools, pink/dark urine, unusual/prolonged bleeding.      NOTES:  Do not share this medication with others. Laboratory and/or medical tests (such as INR, complete blood count) must be performed periodically to monitor your progress or check for side effects. Consult your doctor for more details.      MISSED DOSE:  For the best possible benefit, do not miss any doses. If you do miss a dose and remember on the same day, take it as soon as you remember. If you remember on the next day, skip the missed dose and resume your usual dosing schedule. Do not double the dose to catch up because this could increase your risk for bleeding. Keep a record of missed doses to give to your doctor or pharmacist. Contact your doctor or pharmacist if you miss 2 or more doses in a row.      STORAGE:  Store at room temperature away from light and moisture. Do not store in the bathroom. Keep all medications away from children and pets. Do not flush medications down the toilet or pour them into a drain unless instructed to do so. Properly discard this product when it is  or no longer needed. Consult your  pharmacist or local waste disposal company for more details about how to safely discard your product.      MEDICAL ALERT:  Your condition and medication can cause complications in a medical emergency. For information about enrolling in MedicAlert, call 1-175.694.3877 ([place]) or 1-608.606.7203 ([place]).      Information last revised October 2010 Copyright(c) 2010 [employer]             Depression / Suicide Risk    As you are discharged from this [facility] facility, it is important to learn how to keep safe from harming yourself.    Recognize the warning signs:  · Abrupt changes in personality, positive or negative- including increase in energy   · Giving away possessions  · Change in eating patterns- significant weight changes-  positive or negative  · Change in sleeping patterns- unable to sleep or sleeping all the time   · Unwillingness or inability to communicate  · Depression  · Unusual sadness, discouragement and loneliness  · Talk of wanting to die  · Neglect of personal appearance   · Rebelliousness- reckless behavior  · Withdrawal from people/activities they love  · Confusion- inability to concentrate     If you or a loved one observes any of these behaviors or has concerns about self-harm, here's what you can do:  · Talk about it- your feelings and reasons for harming yourself  · Remove any means that you might use to hurt yourself (examples: pills, rope, extension cords, firearm)  · Get professional help from the community (Mental Health, Substance Abuse, psychological counseling)  · Do not be alone:Call your Safe Contact- someone whom you trust who will be there for you.  · Call your local CRISIS HOTLINE 548-8503 or 939-185-6946  · Call your local Children's Mobile Crisis Response Team [place] (585) 285-4295 or www.[employer]  · Call the toll free National Suicide Prevention Hotlines   · National Suicide Prevention Lifeline 441-864-WETN (3972)  · [employer]  800-SUICIDE (487-3737)

## 2019-08-11 NOTE — CARE PLAN
Patient is educated of disease process and condition. Treatment team has included patient in plan of care. All medications indications and side effects are explained. Patient is encouraged to ask questions. Patient indicates understanding.

## 2019-08-11 NOTE — PROGRESS NOTES
Nephrology Daily Progress Note    Date of Service  8/11/2019    Chief Complaint  79 y.o. male with ESRD/HD,admitted 7/28/2019 with worsening SOB, hyperkalemia    Interval Problem Update  Pt is doing well, anxious to go home    Review of Systems  Review of Systems   Constitutional: Negative for chills, fever and malaise/fatigue.   Respiratory: Negative for cough and shortness of breath.    Cardiovascular: Negative for chest pain and leg swelling.   Gastrointestinal: Negative for nausea and vomiting.   Genitourinary: Negative for dysuria, frequency and urgency.        Physical Exam  Temp:  [36.3 °C (97.3 °F)-36.8 °C (98.3 °F)] 36.4 °C (97.6 °F)  Pulse:  [101-127] 113  Resp:  [14-16] 16  BP: (100-119)/(59-76) 107/59  SpO2:  [95 %-99 %] 95 %    Physical Exam   Constitutional: He is oriented to person, place, and time. No distress.   HENT:   Mouth/Throat: No oropharyngeal exudate.   Eyes: No scleral icterus.   Cardiovascular: Normal rate and regular rhythm.   No murmur heard.  Pulmonary/Chest: Effort normal and breath sounds normal. No respiratory distress. He has no wheezes.   Musculoskeletal: He exhibits no edema or deformity.   Neurological: He is alert and oriented to person, place, and time.   Skin: Skin is warm. He is not diaphoretic. No erythema.   Psychiatric: He has a normal mood and affect. His behavior is normal.   Nursing note and vitals reviewed.      Fluids    Intake/Output Summary (Last 24 hours) at 8/11/2019 1323  Last data filed at 8/11/2019 0800  Gross per 24 hour   Intake 120 ml   Output --   Net 120 ml       Laboratory  Recent Labs     08/09/19  0545 08/10/19  0441 08/11/19  0702   WBC 12.3* 7.9 18.4*   RBC 2.76* 3.13* 3.34*   HEMOGLOBIN 8.7* 9.5* 10.2*   HEMATOCRIT 28.1* 32.0* 36.0*   .8* 102.2* 107.8*   MCH 31.5 30.4 30.5   MCHC 31.0* 29.7* 28.3*   RDW 62.1* 62.2* 65.2*   PLATELETCT 118* 134* 139*   MPV 11.0 11.0 10.9     Recent Labs     08/09/19  0545 08/10/19  0441 08/11/19  0702   SODIUM  134* 135 133*   POTASSIUM 4.6 5.0 6.2*   CHLORIDE 96 98 95*   CO2 23 21 21   GLUCOSE 107* 77 85   BUN 81* 57* 88*   CREATININE 8.09* 5.83* 8.13*   CALCIUM 9.4 9.3 9.7     Recent Labs     08/10/19  1953   INR 1.12     No results for input(s): NTPROBNP in the last 72 hours.        Imaging  EC-ECHOCARDIOGRAM LTD W/O CONT   Final Result      DX-CHEST-LIMITED (1 VIEW)   Final Result      1.  Cardiac silhouette enlargement.   2.  Hypoaeration changes versus mild vascular congestion.   3.  New right IJ dialysis catheter.      4.  Pneumothorax.      DX-PORTABLE FLUOROSCOPY < 1 HOUR   Final Result      Single intraoperative image intended for localization and not for diagnostic purposes. CT procedure report for details.   Fluoroscopy Time:  0.03 seconds.  1 fluoroscopic images were obtained.      US-EXTREMITY ARTERY LOWER UNILAT RIGHT   Final Result      US-HEMODIALYSIS ACCESS CREATION DUPLEX UNILAT RIGHT   Final Result      US-HEMODIALYSIS GRAFT DUPLEX COMP UPPER EXTREMITY   Final Result      EC-ECHOCARDIOGRAM COMPLETE W/O CONT   Final Result      EC-ECHOCARDIOGRAM LTD W/O CONT   Final Result      DX-CHEST-LIMITED (1 VIEW)   Final Result      1.  No evidence of pneumothorax following RIGHT neck catheter placement   2.  Persistently enlarged cardiac silhouette   3.  Atherosclerosis      EC-ECHOCARDIOGRAM LTD W/O CONT   Final Result      CT-ABDOMEN-PELVIS W/O   Final Result      No CT evidence of acute inflammatory process in the abdomen or pelvis      Very large pericardial effusion has increased from 2016      Small right pleural effusion has enlarged      Aortic valvular calcifications indicating aortic stenosis. There is also severe coronary disease      Atrophic kidneys with cysts. Hepatic cysts are also seen.      Moderate distal colonic diverticulosis without evidence of diverticulitis      US-HEMODIALYSIS GRAFT DUPLEX COMP UPPER EXTREMITY   Final Result      [id number] VIEW   Final Result      1.  No evidence of  bowel obstruction.   2.  Abdominal aortic and mesenteric vascular calcifications.      DX-CHEST-PORTABLE (1 VIEW)   Final Result      1.  Stable cardiomegaly.   2.  No acute abnormality.      CL-PERICARDIOCENTESIS    (Results Pending)   IR-CVC NON TUNNELED > AGE 5    (Results Pending)         Assessment/Plan  1.ESRD.  2.Hypotensive: Better  3.hyperkalemia  4.Anemia: better  5.AVF malfunction.  Recs:  Plan for extra HD today to manage hyperkalemia  Renal diet  Daily labs  Renal dose all meds  Avoid nephrotoxins  Prognosis guarded.  D/W Dr Schwarz

## 2019-08-11 NOTE — PROGRESS NOTES
Assumed care this am from night shift RN. Patient awake and alert during report. Call bell and personal items within reach, bed locked in low position. Bed exit alarm armed. Hourly rounding in place.

## 2019-08-11 NOTE — PROGRESS NOTES
Discharge instructions given to patient. Prescriptions given to patient. Discharge instructions given to patient at bedside, verbalizes understanding and states plans for follow-up with PCP. New and home medication review, post-discharge activity level and worsening of symptoms needing follow-up care discussed. Telemetry monitor/IV cathlon removed. All belongings accounted for, all questions answered at this time. Patient escorted in wheelchair to vehicle where wife drove home.

## 2019-08-11 NOTE — DISCHARGE SUMMARY
Discharge Summary    CHIEF COMPLAINT ON ADMISSION  Chief Complaint   Patient presents with   • Shortness of Breath       Reason for Admission  SOB, effusion     Admission Date  7/28/2019    CODE STATUS  DNAR, I OK    HPI & HOSPITAL COURSE   78 y/o male patient with Hx ESRD on HD, Systolic CHF, AFib/Flttr, COPD, HLD, HTN, PVD, CAD, AC on coumadin, distant Hx of pericardial effusion.  Presented with SOB and in ED found to be hyperkalemic, with volume overload and had emergent HD. CT done on 7/30 for abd pain showing large pericardial effusion. Came to the ICU on 8/1 with hypotension. Echo done showing tamponade physiology and went to cath lab for pericardial drain. 1300ml out on placement. Prior to the procedure he was given KCentra, FFP and Vit K for INR of 3.4.  Post procedure remained hypotensive and on pressors, slow to resolve but resolve he did and was able to transfer to the telemetry floor. Repeat ECHO on 8/10 showed resolution of effusion. After extensive discussion he is wishing to re-try Warfarin which was started on 8/11. He will have INR checked with HD - next is tomorrow 8/12 - and adjustments made there.     Cardiology was following along as well, made some adjustments to his cardiac meds, namely increase in Amio dose and initiation of beta blocker. Torsemide and Lisinopril will not be continued on discharge.     Was thought to have a gouty flare up and steroids were given. Foot pain has been slow to resolve but I feel his steroids were the reason for the WBC bump to 18k on 8/11. On exam, his lungs are clear, no fevers overnight, no localizing symptoms otherwise to suggest infection at this time.     Patient has appointments with HD tomorrow and Cardiology to be scheduled. He was monitored walking in the hallway, no lightheadedness, no CP, exam reassuring.     Therefore, he is discharged in fair and stable condition to home with close outpatient follow-up.    The patient met 2-midnight criteria for an  inpatient stay at the time of discharge.    Discharge Date  8/11/2019    FOLLOW UP ITEMS POST DISCHARGE  Follow up with HD 8/12  INR checks there  Follow up Cardiology clinic    DISCHARGE DIAGNOSES  Principal Problem:    Pericardial tamponade POA: Yes  Active Problems:    Dyslipidemia POA: Yes    PAD (peripheral artery disease) (HCC) POA: Unknown    Essential hypertension POA: Yes    Hypotension POA: Unknown    Aortic stenosis POA: Yes    Chronic anticoagulation (Chronic) POA: Yes    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HCC) POA: Unknown    Paroxysmal atrial fibrillation (HCC) POA: Yes    Shock (HCC) POA: Unknown    Elevated transaminase level POA: Clinically Undetermined    BPH (benign prostatic hypertrophy) POA: Yes      Overview: ICD-10 transition    COPD (chronic obstructive pulmonary disease) ([facility]) POA: Yes    Anemia in CKD (chronic kidney disease) POA: Yes    Hyperkalemia, diminished renal excretion POA: Yes    ESRD (end stage renal disease) on dialysis ([facility]) POA: Yes    Uremia POA: Yes      Overview:       Continue HD per nephro  Resolved Problems:    Delirium POA: No      FOLLOW UP  Future Appointments   Date Time Provider Department Center   8/13/2019 10:45 AM James Mendoza M.D. [facility] None   9/12/2019  2:45 PM [facility] None   9/13/2019  1:40 PM Karon Grissom M.D. [facility] None   9/17/2019  1:20 PM Martha Light M.D. [facility]     No follow-up provider specified.    MEDICATIONS ON DISCHARGE     Medication List      START taking these medications      Instructions   metoprolol 50 MG Tabs  Commonly known as:  LOPRESSOR   Take 1 Tab by mouth 2 Times a Day.  Dose:  50 mg     midodrine 5 MG Tabs  Commonly known as:  PROAMATINE   Take 3 Tabs by mouth 3 times a day, with meals.  Dose:  15 mg        CHANGE how you take these medications      Instructions   amiodarone 200 MG Tabs  What changed:    · when to take this  · Another medication with the same name was removed. Continue taking this  medication, and follow the directions you see here.  Commonly known as:  CORDARONE   Take 1 Tab by mouth 2 Times a Day.  Dose:  200 mg     tamsulosin 0.4 MG capsule  Start taking on:  8/12/2019  What changed:    · how much to take  · when to take this  Commonly known as:  FLOMAX   Take 1 Cap by mouth ONE-HALF HOUR AFTER BREAKFAST.  Dose:  0.4 mg        CONTINUE taking these medications      Instructions   calcium acetate 667 MG Caps  Commonly known as:  PHOS-LO   Take 2,001 mg by mouth 3 times a day, with meals. 2001mg three times a day with meals and 1334mg with snacks  Dose:  2,001 mg     doxazosin 4 MG Tabs  Commonly known as:  CARDURA   Take 4 mg by mouth every evening.  Dose:  4 mg     Fluticasone-Umeclidin-Vilant 100-62.5-25 MCG/INH Aepb   Inhale 1 Puff by mouth every day. Rinse mouth after use  Dose:  1 Puff     levalbuterol 1.25 MG/3ML Nebu  Commonly known as:  XOPENEX   Doctor's comments:  COPD J44.9  3 mL by Nebulization route every four hours as needed for Shortness of Breath.  Dose:  1.25 mg     omeprazole 40 MG delayed-release capsule  Commonly known as:  PRILOSEC   Take 40 mg by mouth every morning.  Dose:  40 mg     pravastatin 20 MG Tabs  Commonly known as:  PRAVACHOL   Take 20 mg by mouth every evening.  Dose:  20 mg     traZODone 150 MG Tabs  Commonly known as:  DESYREL   Take 300 mg by mouth every evening.  Dose:  300 mg     warfarin 5 MG Tabs  Commonly known as:  COUMADIN   Take 1 Tab by mouth every day.  Dose:  5 mg     zolpidem 10 MG Tabs  Commonly known as:  AMBIEN   Take 1 Tab by mouth every bedtime for 90 days.  Dose:  10 mg        STOP taking these medications    lisinopril 5 MG Tabs  Commonly known as:  PRINIVIL     metoprolol  MG Tb24  Commonly known as:  TOPROL XL     torsemide 10 MG tablet  Commonly known as:  DEMADEX            Allergies  No Known Allergies    DIET  Orders Placed This Encounter   Procedures   • Diet Order Renal     Standing Status:   Standing     Number of  Occurrences:   1     Order Specific Question:   Diet:     Answer:   Renal [8]       ACTIVITY  As tolerated.  Weight bearing as tolerated    CONSULTATIONS  ICU  Cards  Neph    PROCEDURES  Pericardiocentesis     LABORATORY  Lab Results   Component Value Date    SODIUM 133 (L) 08/11/2019    POTASSIUM 6.2 (H) 08/11/2019    CHLORIDE 95 (L) 08/11/2019    CO2 21 08/11/2019    GLUCOSE 85 08/11/2019    BUN 88 (HH) 08/11/2019    CREATININE 8.13 (HH) 08/11/2019    CREATININE 2.1 (H) 05/06/2009        Lab Results   Component Value Date    WBC 18.4 (H) 08/11/2019    HEMOGLOBIN 10.2 (L) 08/11/2019    HEMATOCRIT 36.0 (L) 08/11/2019    PLATELETCT 139 (L) 08/11/2019        Total time of the discharge process exceeds 41 minutes.

## 2019-08-13 ENCOUNTER — TELEPHONE (OUTPATIENT)
Dept: NEPHROLOGY | Facility: MEDICAL CENTER | Age: 79
End: 2019-08-13

## 2019-08-13 ENCOUNTER — OFFICE VISIT (OUTPATIENT)
Dept: CARDIOLOGY | Facility: MEDICAL CENTER | Age: 79
End: 2019-08-13
Payer: MEDICARE

## 2019-08-13 VITALS
DIASTOLIC BLOOD PRESSURE: 60 MMHG | WEIGHT: 160 LBS | SYSTOLIC BLOOD PRESSURE: 92 MMHG | HEART RATE: 108 BPM | BODY MASS INDEX: 24.25 KG/M2 | OXYGEN SATURATION: 92 % | HEIGHT: 68 IN

## 2019-08-13 DIAGNOSIS — I31.4 PERICARDIAL TAMPONADE: ICD-10-CM

## 2019-08-13 DIAGNOSIS — E78.5 DYSLIPIDEMIA: ICD-10-CM

## 2019-08-13 DIAGNOSIS — I10 ESSENTIAL HYPERTENSION: ICD-10-CM

## 2019-08-13 DIAGNOSIS — Z79.01 CHRONIC ANTICOAGULATION: Chronic | ICD-10-CM

## 2019-08-13 DIAGNOSIS — I35.0 NONRHEUMATIC AORTIC VALVE STENOSIS: ICD-10-CM

## 2019-08-13 DIAGNOSIS — I48.0 PAROXYSMAL ATRIAL FIBRILLATION (HCC): ICD-10-CM

## 2019-08-13 PROCEDURE — 99214 OFFICE O/P EST MOD 30 MIN: CPT | Performed by: INTERNAL MEDICINE

## 2019-08-13 RX ORDER — LISINOPRIL 5 MG/1
5 TABLET ORAL DAILY
Qty: 90 TAB | Refills: 3 | Status: SHIPPED | OUTPATIENT
Start: 2019-08-13 | End: 2019-08-13

## 2019-08-13 ASSESSMENT — MINNESOTA LIVING WITH HEART FAILURE QUESTIONNAIRE (MLHF)
DIFFICULTY SOCIALIZING WITH FAMILY OR FRIENDS: 3
MAKING YOU FEEL DEPRESSED: 1
WALKING ABOUT OR CLIMBING STAIRS DIFFICULT: 4
DIFFICULTY WORKING TO EARN A LIVING: 5
FEELING LIKE A BURDEN TO FAMILY AND FRIENDS: 4
DIFFICULTY TO CONCENTRATE OR REMEMBERING THINGS: 2
MAKING YOU SHORT OF BREATH: 4
MAKING YOU WORRY: 3
LOSS OF SELF CONTROL IN YOUR LIFE: 4
WORKING AROUND THE HOUSE OR YARD DIFFICULT: 4
COSTING YOU MONEY FOR MEDICAL CARE: 3
HAVING TO SIT OR LIE DOWN DURING THE DAY: 3
EATING LESS FOODS YOU LIKE: 3
DIFFICULTY WITH SEXUAL ACTIVITIES: 3
MAKING YOU STAY IN A HOSPITAL: 5
TOTAL_SCORE: 75
GIVING YOU SIDE EFFECTS FROM TREATMENTS: 4
DIFFICULTY WITH RECREATIONAL PASTIMES, SPORTS, HOBBIES: 4
TIRED, FATIGUED OR LOW ON ENERGY: 4
DIFFICULTY SLEEPING WELL AT NIGHT: 4
DIFFICULTY GOING AWAY FROM HOME: 3
SWELLING IN ANKLES OR LEGS: 5

## 2019-08-13 ASSESSMENT — 6 MINUTE WALK TEST (6MWT): TOTAL DISTANCE WALKED (METERS): 0

## 2019-08-13 NOTE — TELEPHONE ENCOUNTER
Was the patient seen in the last year in this department? No     Does patient have an active prescription for medications requested? No     Received Request Via: Pharmacy     Pharmacy sent a refill request for Lisinopril 5mg tabs.

## 2019-08-13 NOTE — PROGRESS NOTES
"Chief Complaint   Patient presents with   • CHF (Chronic)       Subjective:   Ronny aCstelan is a 79 y.o. male who presents today for evaluation in heart failure clinic because of recent admission with [place] nod secondary to a large pericardial effusion.  He does have a history of atrial flutter and has been considered for ablation.  His ejection fraction in June was 30% on JUSTIN.  However, during his recent admission it was 55 to 65%.  He was discharged on amiodarone therapy and warfarin anticoagulation.    He was hospitalized for about 2 weeks and is a week since discharge a couple of days ago.  He has had no significant difficulty with dyspnea.  He does have some chronic edema.  He has had no PND or orthopnea.  He is only on oxygen as needed.  He is noted no chest discomfort, palpitations or lightheadedness.    Past Medical History:   Diagnosis Date   • Anesthesia     \"needs more sedation\" (can sometimes hear MD during procedure)    • Anticoagulation monitoring, special range    • Aortic stenosis     mod AS- concern for low flow severe AS with rEFof 30%   • Arterial leg ulcer ([facility]) 11/8/2018   • Atrial flutter ([facility]) 6/12/2019   • Basal cell carcinoma of left cheek 3/26/2015   • BPH 7/14/2009   • Bronchitis 12/25/2018   • CAD (coronary artery disease) 2/20/2014   • CATARACT     sanjuanita surgery complete   • CHF (congestive heart failure), NYHA class II, chronic, systolic ([facility]) E F30 in setting of atrial flutter 6/13/2019   • Chronic respiratory failure with hypoxia ([facility]) 5/8/2016   • CKD (chronic kidney disease) stage 4, GFR 15-29 ml/min ([facility]) 1/15/2010    end stage renal disease   • COPD (chronic obstructive pulmonary disease) ([facility])     wears 2.5 L o2 sometimes when he sleeps   • Detached retina    • Dialysis     [place],w,[place]   • EMPHYSEMA    • Gout    • Heart burn     occas   • Heart murmur     maria esther lee cardiologist   • High cholesterol    • Hypertension    • Indigestion     occas   • Kidney cyst    • " Leg pain, bilateral 8/10/2015   • On supplemental oxygen therapy    • Peripheral vascular disease (HCC) 8/10/2015   • Pneumonia    • Primary insomnia 3/22/2018   • Proteinuria    • PVD (peripheral vascular disease) (HCC)    • Sleep apnea     O2 PER CANULA  AT NIGHT 2l/m     Past Surgical History:   Procedure Laterality Date   • AV FISTULA CREATION Right 8/6/2019    Procedure: CREATION, AV FISTULA -  RIGHT ARM  ,  and Ligation of Left Arm Fistula;  Surgeon: Sera Peters M.D.;  Location: [facility];  Service: General   • CATH PLACEMENT Right 8/6/2019    Procedure: INSERTION, CATHETER - PERMA ,;  Surgeon: Sera Peters M.D.;  Location: [facility];  Service: General   • JUSTIN  6/13/2019    Procedure: ECHOCARDIOGRAM, TRANSESOPHAGEAL;  Surgeon: Harvinder Hoang M.D.;  Location: [facility];  Service: Cardiac   • CARDIOVERSION  6/13/2019    Procedure: CARDIOVERSION;  Surgeon: Harvinder Hoang M.D.;  Location: [facility];  Service: Cardiac   • AV FISTULA CREATION Right 2/21/2019    Procedure: AV FISTULA CREATION - ARM;  Surgeon: David Cason M.D.;  Location: [facility];  Service: General   • FEMORAL ENDARTERECTOMY Left 1/25/2019    Procedure: FEMORAL ENDARTERECTOMY;  Surgeon: David Cason M.D.;  Location: [facility];  Service: General   • FEMORAL POPLITEAL BYPASS Left 1/25/2019    Procedure: LEFT FEMORAL POPLITEAL POLYTETRAFLUOROETHYLENE ePTFE(PROPATEN VASCULAR GRAFT) BYPASS;  Surgeon: David Cason M.D.;  Location: [facility];  Service: General   • ANGIOGRAM Left 1/25/2019    Procedure: LEFT LEG ANGIOGRAM;  Surgeon: David Cason M.D.;  Location: [facility];  Service: General   • ENDOSCOPY PROCEDURE  3/21/2018    Procedure: ENDOSCOPY PROCEDURE/UPPER;  Surgeon: Enrique Child D.O.;  Location: [facility];  Service: Gastroenterology   • COLONOSCOPY - ENDO  8/15/2016     Procedure: COLONOSCOPY - ENDO;  Surgeon: Mane Whatley M.D.;  Location: ENDOSCOPY [facility];  Service:    • GASTROSCOPY WITH BIOPSY  8/13/2016    Procedure: GASTROSCOPY WITH BIOPSY;  Surgeon: Jorge Leavitt M.D.;  Location: ENDOSCOPY [facility];  Service:    • RECOVERY  12/23/2015    Procedure: VASCULAR CASE-CASON-LEFT ARM FISTULOGRAM WITH ANGIOPLASTY;  Surgeon: Recoveryonbhavani Surgery;  Location: SURGERY PRE-POST PROC UNIT [facility];  Service:    • RECOVERY  3/24/2015    Performed by Recoveryonly Surgery at SURGERY PRE-POST PROC UNIT [facility]   • RECOVERY  7/29/2014    Performed by Ir-Recovery Surgery at SURGERY SAME DAY [facility]   • RECOVERY  3/24/2014    Performed by Ir[facility]Recovery Surgery at SURGERY [facility]   • RECOVERY  12/17/2013    Performed by Ir-Recovery Surgery at SURGERY SAME DAY [facility]   • RECOVERY  7/2/2013    Performed by [facility]overy Surgery at SURGERY SAME DAY [facility]   • AV FISTULA THROMBOLYSIS  7/2/2013    Performed by David Cason M.D. at SURGERY [facility]   • RECOVERY  1/29/2013    Performed by Ir[facility]Recovery Surgery at SURGERY SAME DAY [facility]   • RECOVERY  7/23/2012    Performed by SURGERY, IR-RECOVERY at SURGERY SAME DAY [facility]   • VITRECTOMY POSTERIOR  10/11/2011    Performed by NAHUM GE at SURGERY SAME DAY [facility]   • RECOVERY  8/12/2011    Performed by SURGERY, IR-RECOVERY at SURGERY SAME DAY [facility]   • VITRECTOMY POSTERIOR  1/18/2011    Performed by NAHUM GE at SURGERY SAME DAY [facility]   • SCLERAL BUCKLING  1/18/2011    Performed by NAHUM GE at SURGERY SAME DAY [facility]   • AV FISTULOGRAM  9/17/2010    Performed by DAVID CASON at SURGERY [facility]   • ANGIOPLASTY BALLOON  9/17/2010    Performed by DAVID CASON at SURGERY [facility]   • AV FISTULOGRAM  7/23/2010    Performed by DAVID CASON at SURGERY [facility]   • ANGIOPLASTY BALLOON  7/23/2010    Performed by BLANKA  ALESHIA OSEI at SURGERY [facility]   • AV FISTULA REVISION  2/19/2010    Performed by WILLIE HATCH at SURGERY [facility]S   • AV FISTULA CREATION  2/12/2010    Performed by ALESHIA MOSCOSO at SURGERY [facility]   • CATARACT PHACO WITH IOL  4/8/08    Performed by STACEY PHILLIPS at SURGERY SAME DAY [facility]   • OTHER ORTHOPEDIC SURGERY  1998    right toe for facititis     Family History   Problem Relation Age of Onset   • Stroke Mother    • Hypertension Mother    • Lung Disease Father         Emphysema, resp failure   • Genitourinary () Problems Maternal Aunt         hematuria   • Hypertension Brother      Social History     Socioeconomic History   • Marital status:      Spouse name: Not on file   • Number of children: Not on file   • Years of education: Not on file   • Highest education level: Not on file   Occupational History   • Not on file   Social Needs   • Financial resource strain: Not on file   • Food insecurity:     Worry: Not on file     Inability: Not on file   • Transportation needs:     Medical: Not on file     Non-medical: Not on file   Tobacco Use   • Smoking status: Former Smoker     Packs/day: 1.00     Years: 40.00     Pack years: 40.00     Types: Cigarettes     Last attempt to quit: 1/1/2009     Years since quitting: 10.6   • Smokeless tobacco: Never Used   • Tobacco comment: 1 pk a day for 35 yrs, QUIT JAN 1 2010   Substance and Sexual Activity   • Alcohol use: No     Alcohol/week: 0.0 oz   • Drug use: No   • Sexual activity: Never     Partners: Female     Comment: , two sons, 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   Lifestyle   • Physical activity:     Days per week: Not on file     Minutes per session: Not on file   • Stress: Not on file   Relationships   • Social connections:     Talks on phone: Not on file     Gets together: Not on file     Attends [employer] service: Not on file     Active member of club or organization: Not on file     Attends meetings of clubs  or organizations: Not on file     Relationship status: Not on file   • Intimate partner violence:     Fear of current or ex partner: Not on file     Emotionally abused: Not on file     Physically abused: Not on file     Forced sexual activity: Not on file   Other Topics Concern   • Not on file   Social History Narrative   • Not on file     No Known Allergies  Outpatient Encounter Medications as of 8/13/2019   Medication Sig Dispense Refill   • amiodarone (CORDARONE) 200 MG Tab Take 1 Tab by mouth 2 Times a Day. 60 Tab 0   • tamsulosin (FLOMAX) 0.4 MG capsule Take 1 Cap by mouth ONE-HALF HOUR AFTER BREAKFAST. 90 Cap 0   • midodrine (PROAMATINE) 5 MG Tab Take 3 Tabs by mouth 3 times a day, with meals. 90 Tab 0   • metoprolol (LOPRESSOR) 50 MG Tab Take 1 Tab by mouth 2 Times a Day. 60 Tab 0   • zolpidem (AMBIEN) 10 MG Tab Take 1 Tab by mouth every bedtime for 90 days. 90 Each 0   • warfarin (COUMADIN) 5 MG Tab Take 1 Tab by mouth every day. 30 Tab 3   • omeprazole (PRILOSEC) 40 MG delayed-release capsule Take 40 mg by mouth every morning.     • calcium acetate (PHOS-LO) 667 MG Cap Take 2,001 mg by mouth 3 times a day, with meals. 2001mg three times a day with meals and 1334mg with snacks     • Fluticasone-Umeclidin-Vilant (TRELEGY ELLIPTA) 100-62.5-25 MCG/INH AEROSOL POWDER, BREATH ACTIVATED Inhale 1 Puff by mouth every day. Rinse mouth after use 1 Each 11   • levalbuterol (XOPENEX) 1.25 MG/3ML Nebu Soln 3 mL by Nebulization route every four hours as needed for Shortness of Breath. 120 Bullet 11   • doxazosin (CARDURA) 4 MG Tab Take 4 mg by mouth every evening.     • pravastatin (PRAVACHOL) 20 MG Tab Take 20 mg by mouth every evening.     • lisinopril (PRINIVIL) 5 MG Tab Take 1 Tab by mouth every day. (Patient not taking: Reported on 8/13/2019) 90 Tab 3   • traZODone (DESYREL) 150 MG Tab Take 300 mg by mouth every evening.       No facility-administered encounter medications on file as of 8/13/2019.      ROS      "Objective:   BP (!) 92/60 (BP Location: Left arm, Patient Position: Sitting, BP Cuff Size: Adult)   Pulse (!) 108   Ht 1.727 m (5' 8\")   Wt 72.6 kg (160 lb)   SpO2 92%   BMI 24.33 kg/m²     Physical Exam   Constitutional: He appears well-developed and well-nourished.   Neck: No JVD present.   Cardiovascular: Normal rate. An irregular rhythm present. Frequent extrasystoles are present.   Murmur (3/6 systolic at the base) heard.  Pulmonary/Chest: Effort normal and breath sounds normal. He has no rales.   Abdominal: Soft. There is no tenderness.   Musculoskeletal: He exhibits edema (3+).      Lab Results   Component Value Date/Time    CHOLSTRLTOT 90 (L) 06/22/2017 07:55 AM    LDL 26 06/22/2017 07:55 AM    HDL 57 06/22/2017 07:55 AM    TRIGLYCERIDE 37 06/22/2017 07:55 AM       Lab Results   Component Value Date/Time    SODIUM 133 (L) 08/11/2019 07:02 AM    POTASSIUM 6.2 (H) 08/11/2019 07:02 AM    CHLORIDE 95 (L) 08/11/2019 07:02 AM    CO2 21 08/11/2019 07:02 AM    GLUCOSE 85 08/11/2019 07:02 AM    BUN 88 (HH) 08/11/2019 07:02 AM    CREATININE 8.13 (HH) 08/11/2019 07:02 AM    CREATININE 2.1 (H) 05/06/2009 10:08 AM     Lab Results   Component Value Date/Time    ALKPHOSPHAT 167 (H) 08/11/2019 07:02 AM    ASTSGOT 28 08/11/2019 07:02 AM    ALTSGPT 14 08/11/2019 07:02 AM    TBILIRUBIN 0.7 08/11/2019 07:02 AM      Lab Results   Component Value Date/Time    BNPBTYPENAT 1583 (H) 04/15/2017 04:15 PM              Echocardiography Laboratory    CONCLUSIONS  Compared to the images of the study done 8/1/19 - there has been   resolution of pericardial effusion.  Left ventricular ejection fraction is visually estimated to be 55%.  Moderate to severe aortic stenosis.Vmax is 3.50  m/s.  Transvalvular gradients are - Peak: 49 mmHg, Mean:  30 mmHg.  Dimensionless index is 0.2.  Right ventricular systolic pressure is estimated to be 55 mmHg.      EDVIN GREEN  Exam Date:         08/02/2019         Assessment:     1. Chronic " anticoagulation     2. Dyslipidemia     3. Essential hypertension     4. Nonrheumatic aortic valve stenosis     5. Paroxysmal atrial fibrillation (HCC)     6. Pericardial tamponade         Medical Decision Making:  Today's Assessment / Status / Plan:     Mr. Castelan is now post pericardiocentesis for tamponade.  Presumably, this was secondary to the uremia.  He is hypotensive at dialysis and somewhat volume overloaded today.  At this time, we will discontinue doxazosin.  He does have significant aortic stenosis but is not entirely clear that he has heart failure secondary to valvular heart disease.  He will follow-up in a couple weeks for repeat evaluation.  He will have a limited echocardiogram prior to that follow-up appointment to reassess his pericardial effusion.  He did have a severely reduced ejection fraction in June but this may have been secondary to atrial flutter with a rapid ventricular response.  He is going to follow-up with EP and may ultimately need a flutter ablation.

## 2019-08-19 ENCOUNTER — APPOINTMENT (OUTPATIENT)
Dept: MEDICAL GROUP | Facility: MEDICAL CENTER | Age: 79
End: 2019-08-19
Payer: MEDICARE

## 2019-08-21 ENCOUNTER — TELEPHONE (OUTPATIENT)
Dept: CARDIOLOGY | Facility: MEDICAL CENTER | Age: 79
End: 2019-08-21

## 2019-08-21 NOTE — TELEPHONE ENCOUNTER
Returned call x 2, no answer LVM with contact information on #[phone] states it has been changed and there is no new number.

## 2019-08-21 NOTE — TELEPHONE ENCOUNTER
FK/Orin      Patient said he's been home from the hospital about a week and his heart rate is still racing. He said it's in the 120s. He wants to know if he can increase his Metoprolol medication. Pt. #[phone] or [phone].

## 2019-08-21 NOTE — TELEPHONE ENCOUNTER
Pt calls back. Pt will see EA at 1400 today. Pt scheduled.     --------------------------------------------------------------------------------------    Pt calls back. Cannot make 1400 today secondary to PM dialysis run today. Pt scheduled for 1240 8/22. Pt inquires re: any metoprolol changes he can make in the meanwhile. Pt reassured I would discuss with providers and convey and changes they recommend prior to being seen tomorrow at 12:40. Pt states understanding.

## 2019-08-22 ENCOUNTER — TELEPHONE (OUTPATIENT)
Dept: CARDIOLOGY | Facility: MEDICAL CENTER | Age: 79
End: 2019-08-22

## 2019-08-22 ENCOUNTER — OFFICE VISIT (OUTPATIENT)
Dept: CARDIOLOGY | Facility: MEDICAL CENTER | Age: 79
End: 2019-08-22
Payer: MEDICARE

## 2019-08-22 VITALS
DIASTOLIC BLOOD PRESSURE: 50 MMHG | SYSTOLIC BLOOD PRESSURE: 88 MMHG | OXYGEN SATURATION: 92 % | HEIGHT: 68 IN | HEART RATE: 115 BPM | WEIGHT: 161 LBS | BODY MASS INDEX: 24.4 KG/M2

## 2019-08-22 DIAGNOSIS — Z79.01 CHRONIC ANTICOAGULATION: Chronic | ICD-10-CM

## 2019-08-22 DIAGNOSIS — Z51.81 ENCOUNTER FOR MONITORING AMIODARONE THERAPY: ICD-10-CM

## 2019-08-22 DIAGNOSIS — I95.9 HYPOTENSION, UNSPECIFIED HYPOTENSION TYPE: ICD-10-CM

## 2019-08-22 DIAGNOSIS — I48.92 ATRIAL FLUTTER, UNSPECIFIED TYPE (HCC): Primary | ICD-10-CM

## 2019-08-22 DIAGNOSIS — Z79.899 ENCOUNTER FOR MONITORING AMIODARONE THERAPY: ICD-10-CM

## 2019-08-22 LAB — EKG IMPRESSION: NORMAL

## 2019-08-22 PROCEDURE — 93000 ELECTROCARDIOGRAM COMPLETE: CPT | Performed by: INTERNAL MEDICINE

## 2019-08-22 PROCEDURE — 99214 OFFICE O/P EST MOD 30 MIN: CPT | Performed by: NURSE PRACTITIONER

## 2019-08-22 RX ORDER — DIGOXIN 125 MCG
125 TABLET ORAL
Qty: 30 TAB | Refills: 2 | Status: SHIPPED | OUTPATIENT
Start: 2019-08-23 | End: 2019-08-22

## 2019-08-22 RX ORDER — AMIODARONE HYDROCHLORIDE 200 MG/1
400 TABLET ORAL 2 TIMES DAILY
Qty: 120 TAB | Refills: 0 | Status: SHIPPED | OUTPATIENT
Start: 2019-08-22 | End: 2019-09-05

## 2019-08-22 ASSESSMENT — ENCOUNTER SYMPTOMS
VOMITING: 0
BLOOD IN STOOL: 0
HEMOPTYSIS: 0
CHILLS: 0
FOCAL WEAKNESS: 0
FEVER: 0
SORE THROAT: 0
DIARRHEA: 0
SHORTNESS OF BREATH: 0
SPUTUM PRODUCTION: 0
LOSS OF CONSCIOUSNESS: 0
SENSORY CHANGE: 0
STRIDOR: 0
DIZZINESS: 0
TINGLING: 0
ABDOMINAL PAIN: 0
WEIGHT LOSS: 0
PND: 0
WHEEZING: 0
TREMORS: 0
BLURRED VISION: 0
PALPITATIONS: 1
NAUSEA: 0
HEADACHES: 0
HEARTBURN: 0
DOUBLE VISION: 0
COUGH: 0
SPEECH CHANGE: 0
ORTHOPNEA: 0

## 2019-08-22 NOTE — TELEPHONE ENCOUNTER
Per EA, she called pharmacy and told them to disregard Rx for Digoxin and cont increased dose of Amio.     Called [facility] and notified of above, they verbalizes understanding

## 2019-08-22 NOTE — PROGRESS NOTES
Cardiology/Electrophysiology Follow-up Note      Subjective:   Chief Complaint:   Chief Complaint   Patient presents with   • Atrial Flutter     FV       Parmjit Castelan is a 79 y.o. male who presents today for follow up arrhythmias.    He is followed by Dr. Crum.  Past medical history also significant for Medical history significant for newly diagnosed atrial flutter on chronic anticoagulation with Coumadin, ESRD with right AV fistula and dialysis MWF, CAD (no interventions), CHF Hypertension, hyperlipidemia, PVD with chronic claudication, BPH, HELGA- not on CPAP, COPD on 2.5L nasal cannula as needed, gout, and moderate AS.    He was last seen by EP clinic, Connie MARRERO in the office, on 07/2019 with consideration for cardioversion vs atrial flutter ablation and Dr. Mendoza for CHF clinic 8/13/19.    He was admitted recently to [facility] for large pericardial effusion and tamponade requiring pericardiocentesis.  Hospital events as is follows:  Presented with SOB and in ED found to be hyperkalemic, with volume overload and had emergent HD. CT done on 7/30 for abd pain showing large pericardial effusion. Came to the ICU on 8/1 with hypotension. Echo done showing tamponade physiology and went to cath lab for pericardial drain. 1300ml out on placement. Prior to the procedure he was given KCentra, FFP and Vit K for INR of 3.4.  Post procedure remained hypotensive and on pressors, slow to resolve but resolve he did and was able to transfer to the telemetry floor. Repeat ECHO on 8/10 showed resolution of effusion. After extensive discussion he is wished to re-try Warfarin which was started on 8/11. He will have INR checked with HD -  Cardiology was following along as well, made some adjustments to his cardiac meds, namely increase in Amio dose and initiation of beta blocker. Torsemide and Lisinopril will not be continued on discharge.         Today in follow up he states that he has continues to get along ok, except for  "noticing elevated heart rates.  He tells me that his heart rates have been consistently in the one teens to 120s at home.  He reports that he has been compliant with his metoprolol and Amiodarone.  He states that volume status wise he is not currently experiencing an increased amount of edema.  He currently denies chest pain, dizziness, pre syncope or syncope, dyspnea, PND, or orthopnea.      Patient endorses medication compliance      Past Medical History:   Diagnosis Date   • Anesthesia     \"needs more sedation\" (can sometimes hear MD during procedure)    • Anticoagulation monitoring, special range    • Aortic stenosis     mod AS- concern for low flow severe AS with rEFof 30%   • Arterial leg ulcer ([facility]) 11/8/2018   • Atrial flutter ([facility]) 6/12/2019   • Basal cell carcinoma of left cheek 3/26/2015   • BPH 7/14/2009   • Bronchitis 12/25/2018   • CAD (coronary artery disease) 2/20/2014   • CATARACT     sanjuanita surgery complete   • CHF (congestive heart failure), NYHA class II, chronic, systolic ([facility]) E F30 in setting of atrial flutter 6/13/2019   • Chronic respiratory failure with hypoxia ([facility]) 5/8/2016   • CKD (chronic kidney disease) stage 4, GFR 15-29 ml/min ([facility]) 1/15/2010    end stage renal disease   • COPD (chronic obstructive pulmonary disease) ([facility])     wears 2.5 L o2 sometimes when he sleeps   • Detached retina    • Dialysis     m,w,f [facility]   • EMPHYSEMA    • Gout    • Heart burn     occas   • Heart murmur     maria esther lee cardiologist   • High cholesterol    • Hypertension    • Indigestion     occas   • Kidney cyst    • Leg pain, bilateral 8/10/2015   • On supplemental oxygen therapy    • Peripheral vascular disease ([facility]) 8/10/2015   • Pneumonia    • Primary insomnia 3/22/2018   • Proteinuria    • PVD (peripheral vascular disease) ([facility])    • Sleep apnea     O2 PER CANULA  AT NIGHT 2l/m     Past Surgical History:   Procedure Laterality Date   • AV FISTULA CREATION Right 8/6/2019    Procedure: " CREATION, AV FISTULA -  RIGHT ARM  ,  and Ligation of Left Arm Fistula;  Surgeon: Sera Peters M.D.;  Location: SURGERY [facility];  Service: General   • CATH PLACEMENT Right 8/6/2019    Procedure: INSERTION, CATHETER - PERMA ,;  Surgeon: Sera Peters M.D.;  Location: SURGERY [facility];  Service: General   • JUSTIN  6/13/2019    Procedure: ECHOCARDIOGRAM, TRANSESOPHAGEAL;  Surgeon: Harvinder Hoang M.D.;  Location: SURGERY [facility];  Service: Cardiac   • CARDIOVERSION  6/13/2019    Procedure: CARDIOVERSION;  Surgeon: Harvinder Hoang M.D.;  Location: [facility];  Service: Cardiac   • AV FISTULA CREATION Right 2/21/2019    Procedure: AV FISTULA CREATION - ARM;  Surgeon: David Cason M.D.;  Location: SURGERY [facility];  Service: General   • FEMORAL ENDARTERECTOMY Left 1/25/2019    Procedure: FEMORAL ENDARTERECTOMY;  Surgeon: David Cason M.D.;  Location: SURGERY [facility];  Service: General   • FEMORAL POPLITEAL BYPASS Left 1/25/2019    Procedure: LEFT FEMORAL POPLITEAL POLYTETRAFLUOROETHYLENE ePTFE(PROPATEN VASCULAR GRAFT) BYPASS;  Surgeon: David Cason M.D.;  Location: [facility];  Service: General   • ANGIOGRAM Left 1/25/2019    Procedure: LEFT LEG ANGIOGRAM;  Surgeon: David Cason M.D.;  Location: SURGERY [facility];  Service: General   • ENDOSCOPY PROCEDURE  3/21/2018    Procedure: ENDOSCOPY PROCEDURE/UPPER;  Surgeon: Enrique Child D.O.;  Location: SURGERY [facility];  Service: Gastroenterology   • COLONOSCOPY - ENDO  8/15/2016    Procedure: COLONOSCOPY - ENDO;  Surgeon: Mane Whatley M.D.;  Location: ENDOSCOPY [facility];  Service:    • GASTROSCOPY WITH BIOPSY  8/13/2016    Procedure: GASTROSCOPY WITH BIOPSY;  Surgeon: Jorge Leavitt M.D.;  Location: ENDOSCOPY [facility];  Service:    • RECOVERY  12/23/2015    Procedure: VASCULAR CASE-BLNAKA-LEFT ARM FISTULOGRAM WITH ANGIOPLASTY;  Surgeon:  Recoveryonly Surgery;  Location: SURGERY PRE-POST PROC UNIT [facility];  Service:    • RECOVERY  3/24/2015    Performed by Recoveryonly Surgery at SURGERY PRE-POST PROC UNIT [facility]   • RECOVERY  7/29/2014    Performed by Ir-Recovery Surgery at SURGERY SAME DAY [facility] ORS   • RECOVERY  3/24/2014    Performed by [facility] at SURGERY [facility]   • RECOVERY  12/17/2013    Performed by [facility]Recovery Surgery at SURGERY SAME DAY [facility] ORS   • RECOVERY  7/2/2013    Performed by [facility] Surgery at SURGERY SAME DAY [facility] ORS   • AV FISTULA THROMBOLYSIS  7/2/2013    Performed by David Cason M.D. at SURGERY [facility] ORS   • RECOVERY  1/29/2013    Performed by Ir-Recovery Surgery at SURGERY SAME DAY [facility] ORS   • RECOVERY  7/23/2012    Performed by SURGERY, IR-RECOVERY at SURGERY SAME DAY [facility] ORS   • VITRECTOMY POSTERIOR  10/11/2011    Performed by NAHUM GE at SURGERY SAME DAY [facility] ORS   • RECOVERY  8/12/2011    Performed by SURGERY, IR-RECOVERY at SURGERY SAME DAY [facility] ORS   • VITRECTOMY POSTERIOR  1/18/2011    Performed by NAHUM GE at SURGERY SAME DAY [facility] ORS   • SCLERAL BUCKLING  1/18/2011    Performed by NAHUM GE at SURGERY SAME DAY [facility] ORS   • AV FISTULOGRAM  9/17/2010    Performed by DAVID CASON at SURGERY [facility] ORS   • ANGIOPLASTY BALLOON  9/17/2010    Performed by DAVID CASON at SURGERY [facility] ORS   • AV FISTULOGRAM  7/23/2010    Performed by DAVID CASON at SURGERY [facility] ORS   • ANGIOPLASTY BALLOON  7/23/2010    Performed by DAVID CASON at SURGERY [facility] ORS   • AV FISTULA REVISION  2/19/2010    Performed by WILLIE HATCH at SURGERY [facility] ORS   • AV FISTULA CREATION  2/12/2010    Performed by DAVID CASON at SURGERY [facility] ORS   • CATARACT PHACO WITH IOL  4/8/08    Performed by STACEY PHILLIPS at SURGERY SAME DAY [facility] ORS   • OTHER ORTHOPEDIC SURGERY  1998    right  toe for facititis     Family History   Problem Relation Age of Onset   • Stroke Mother    • Hypertension Mother    • Lung Disease Father         Emphysema, resp failure   • Genitourinary () Problems Maternal Aunt         hematuria   • Hypertension Brother      Social History     Socioeconomic History   • Marital status:      Spouse name: Not on file   • Number of children: Not on file   • Years of education: Not on file   • Highest education level: Not on file   Occupational History   • Not on file   Social Needs   • Financial resource strain: Not on file   • Food insecurity:     Worry: Not on file     Inability: Not on file   • Transportation needs:     Medical: Not on file     Non-medical: Not on file   Tobacco Use   • Smoking status: Former Smoker     Packs/day: 1.00     Years: 40.00     Pack years: 40.00     Types: Cigarettes     Last attempt to quit: 1/1/2009     Years since quitting: 10.6   • Smokeless tobacco: Never Used   • Tobacco comment: 1 pk a day for 35 yrs, QUIT JAN 1 2010   Substance and Sexual Activity   • Alcohol use: No     Alcohol/week: 0.0 oz   • Drug use: No   • Sexual activity: Never     Partners: Female     Comment: , two sons, 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   Lifestyle   • Physical activity:     Days per week: Not on file     Minutes per session: Not on file   • Stress: Not on file   Relationships   • Social connections:     Talks on phone: Not on file     Gets together: Not on file     Attends Worship service: Not on file     Active member of club or organization: Not on file     Attends meetings of clubs or organizations: Not on file     Relationship status: Not on file   • Intimate partner violence:     Fear of current or ex partner: Not on file     Emotionally abused: Not on file     Physically abused: Not on file     Forced sexual activity: Not on file   Other Topics Concern   • Not on file   Social History Narrative   • Not on file     No Known Allergies    Current  Outpatient Medications   Medication Sig Dispense Refill   • amiodarone (CORDARONE) 200 MG Tab Take 1 Tab by mouth 2 Times a Day. 60 Tab 0   • tamsulosin (FLOMAX) 0.4 MG capsule Take 1 Cap by mouth ONE-HALF HOUR AFTER BREAKFAST. 90 Cap 0   • midodrine (PROAMATINE) 5 MG Tab Take 3 Tabs by mouth 3 times a day, with meals. 90 Tab 0   • metoprolol (LOPRESSOR) 50 MG Tab Take 1 Tab by mouth 2 Times a Day. 60 Tab 0   • zolpidem (AMBIEN) 10 MG Tab Take 1 Tab by mouth every bedtime for 90 days. 90 Each 0   • warfarin (COUMADIN) 5 MG Tab Take 1 Tab by mouth every day. 30 Tab 3   • omeprazole (PRILOSEC) 40 MG delayed-release capsule Take 40 mg by mouth every morning.     • calcium acetate (PHOS-LO) 667 MG Cap Take 2,001 mg by mouth 3 times a day, with meals. 2001mg three times a day with meals and 1334mg with snacks     • Fluticasone-Umeclidin-Vilant (TRELEGY ELLIPTA) 100-62.5-25 MCG/INH AEROSOL POWDER, BREATH ACTIVATED Inhale 1 Puff by mouth every day. Rinse mouth after use 1 Each 11   • levalbuterol (XOPENEX) 1.25 MG/3ML Nebu Soln 3 mL by Nebulization route every four hours as needed for Shortness of Breath. 120 Bullet 11   • pravastatin (PRAVACHOL) 20 MG Tab Take 20 mg by mouth every evening.       No current facility-administered medications for this visit.        Review of Systems   Constitutional: Negative for chills, fever, malaise/fatigue and weight loss.   HENT: Negative for congestion, nosebleeds, sore throat and tinnitus.    Eyes: Negative for blurred vision and double vision.   Respiratory: Negative for cough, hemoptysis, sputum production, shortness of breath, wheezing and stridor.    Cardiovascular: Positive for palpitations. Negative for chest pain, orthopnea, leg swelling and PND.   Gastrointestinal: Negative for abdominal pain, blood in stool, diarrhea, heartburn, melena, nausea and vomiting.   Skin: Negative for rash.   Neurological: Negative for dizziness, tingling, tremors, sensory change, speech change,  focal weakness, loss of consciousness and headaches.     All others systems reviewed and negative.     Objective:     There were no vitals taken for this visit. There is no height or weight on file to calculate BMI.    Physical Exam   Constitutional: He is oriented to person, place, and time and well-developed, well-nourished, and in no distress.   HENT:   Head: Normocephalic and atraumatic.   Eyes: Pupils are equal, round, and reactive to light. Conjunctivae and EOM are normal.   Neck: Normal range of motion. Neck supple. No tracheal deviation present.   Cardiovascular: Normal heart sounds and intact distal pulses. An irregular rhythm present. Tachycardia present. Exam reveals no gallop and no friction rub.   No murmur heard.  Pulses:       Radial pulses are 2+ on the right side, and 2+ on the left side.        Dorsalis pedis pulses are 2+ on the right side, and 2+ on the left side.        Posterior tibial pulses are 2+ on the right side, and 2+ on the left side.   AV fistula +B/T    NO JVD bilaterally    Pulmonary/Chest: Effort normal and breath sounds normal. No respiratory distress. He has no wheezes. He has no rales. He exhibits no tenderness.   Abdominal: Soft. Bowel sounds are normal.   Musculoskeletal: Normal range of motion. He exhibits no edema.   In wheelchair   Neurological: He is alert and oriented to person, place, and time.   Skin: Skin is warm and dry.   Chronic vascular changes to BLE.    Psychiatric: Mood, memory, affect and judgment normal.         Cardiac Imaging and Procedures Review:    EKG (8/22/19) reviewed by myself:   Atrial Flutter, rate 115    Echocardiogram (06/13/2019):   No thrombus detected in the left atrial appendage or other atrial   structures.  Moderately reduced left ventricular systolic function.  Left ventricular ejection fraction is visually estimated to be 30%.  Global hypokinesis.  Diastolic function is difficult to assess with atrial fibrillation.  Reduced right  ventricular systolic function.  Mild mitral regurgitation.  Moderate aortic stenosis.  Mild aortic insufficiency.  Moderate pericardial effusion WITHOUT evidence of hemodynamic   compromise.  Compared to the images of the transthoracic echocardiogram dated   8/7/2018, the ejection fraction is further reduced previously low   normal.  It is difficult to compare the size of the pericardial   effusion due to technique but it was previously large.    8/10/19 LTD  Prior echo done 08/02/19, no recurrence of pericardial effusion.  Prior   study indicated moderate to severe mitral stenosis.  Left ventricular ejection fraction is visually estimated to be 65%.  Mild concentric LVH.  No pericardial effusion seen.    Labs (personally reviewed and notable for):   Lab Results   Component Value Date/Time    SODIUM 133 (L) 08/11/2019 07:02 AM    POTASSIUM 6.2 (H) 08/11/2019 07:02 AM    CHLORIDE 95 (L) 08/11/2019 07:02 AM    CO2 21 08/11/2019 07:02 AM    GLUCOSE 85 08/11/2019 07:02 AM    BUN 88 (HH) 08/11/2019 07:02 AM    CREATININE 8.13 (HH) 08/11/2019 07:02 AM    CREATININE 2.1 (H) 05/06/2009 10:08 AM      Lab Results   Component Value Date/Time    WBC 18.4 (H) 08/11/2019 07:02 AM    RBC 3.34 (L) 08/11/2019 07:02 AM    HEMOGLOBIN 10.2 (L) 08/11/2019 07:02 AM    HEMATOCRIT 36.0 (L) 08/11/2019 07:02 AM    .8 (H) 08/11/2019 07:02 AM    MCH 30.5 08/11/2019 07:02 AM    MCHC 28.3 (L) 08/11/2019 07:02 AM    MPV 10.9 08/11/2019 07:02 AM    NEUTSPOLYS 93.10 (H) 08/11/2019 07:02 AM    LYMPHOCYTES 1.50 (L) 08/11/2019 07:02 AM    MONOCYTES 4.40 08/11/2019 07:02 AM    EOSINOPHILS 0.10 08/11/2019 07:02 AM    EOSINOPHILS 1 08/01/2019 03:40 PM    BASOPHILS 0.20 08/11/2019 07:02 AM    HYPOCHROMIA 1+ 07/28/2019 11:21 PM    ANISOCYTOSIS 1+ 08/11/2019 07:02 AM      PT/INR:   Lab Results   Component Value Date/Time    PROTHROMBTM 14.7 (H) 08/10/2019 07:53 PM    INR 1.12 08/10/2019 07:53 PM       Assessment:     1. Atrial flutter, unspecified  type (HCC)  EKG    DIGOXIN   2. Encounter for monitoring amiodarone therapy     3. Hypotension, unspecified hypotension type     4. Chronic anticoagulation         Medical Decision Making:  Today's Assessment / Status / Plan:   1. Atrial Flutter:  - Patient remains in atrial flutter with moderately elevated ventricular rates (115 today).    - He reports that he he would like to have flutter ablation ASAP as previous scheduled.  I have tried to consider flutter ablation with Dr. Grissom per Dr. Grissom's recommendations tomorrow or early next week but no availability in the lab at this time.  Because of this will scheduled JUSTIN guided cardioversion per Dr. Grissom's alternative recommendations.  Long term will need flutter ablation, he will be scheduled to meet with Dr. Grissom in 2 weeks in the office to discuss.  - Will also increase Amiodarone to 400 mg BID for the time being per Dr. Grissom's recommendations.   - He will continue Metoprolol BID, dosing limited by blood pressure.  - He is on coumadin, last INR 8/10, subtherapeutic.  Has not had INR since then.  He will contact [facility] today for plan for repeat INR testing at dialysis tomorrow and regular follow up.      - Advised ER precautions if need before planned cardioversion.     The risks, benefits, and alternatives to electrical cardioversion were discussed in great detail. We discussed that conversion of atrial fibrillation to normal rhythm, at least transiently, is successful in 90 to 95% of patients. However, maintaining a normal rhythm depends on a number of factors, including underlying heart disease and antiarrhythmic medications. Atrial fibrillation often recurs with time and other treatments may be necessary. Risks of  cardioversion are low as long as anticoagulation issues are handled appropriately. There is a small (less than 1%) risk of embolic events, including stroke. Risks of electrical shock include mild muscle soreness and mild skin burning at  the site of electrode placement. There is also a risk that cardioversion can stimulate more dangerous arrhythmias. The patient verbalized understanding of these potential complications and wishes to proceed with this procedure.  The risks, benefits, and alternatives to transesophageal echocardiogram with IV sedation were discussed with the patient in specific detail, including oropharyngeal and esophageal traumas including hoarseness and dysphagia after the procedure. Rare cases demonstrating serious or fatal complications associated with transesophageal echocardiogram have been reported in the adult population, including cardiac, pulmonary and bleeding complications in less than 1% of people. Patients with an identified intracardiac thrombus are at increased risk for embolic events and this appears to be reduced with anticoagulant therapy. The patient verbalized understandings about these  possible complications and wishes to proceed with this procedure    2. Amiodarone:  - Increase to 400 mg BID at this time per Dr. Grissom, consider taper after cardioversion next week.     3. Hypotension:  - Continue Midodrine.    4. Anticoagulation:  - Will contact [facility] today per my recommendations for repeat INR testing.     He will keep his echo on Monday as per Dr. Mendoza to follow up effusions S/P pericardiocentesis.     Plan reviewed in detail with the patient and he verbalizes understanding and is in agreement.   RTC in 2 weeks, sooner if clinical condition changes  Collaborating MD/ADD: Case reviewed directly with Dr. Contreras and Dr. Grissom.     DESIREE Montes.

## 2019-08-22 NOTE — TELEPHONE ENCOUNTER
Zara Whitaker from [facility] says that there is a discrepancy with the refill for amiodirone and digxon. She would like a call back as soon as possible at: [phone].     Thank you so much,    Anmol

## 2019-08-22 NOTE — TELEPHONE ENCOUNTER
WILIAM    Pt reports EA was going to send prescription for digoxin to [facility] and not amiodarone. Pt states he has instructions with him that he's to start taking digoxin today. Ronny would please like a call back soon to clarify at [phone] or cell # [phone].

## 2019-08-23 ENCOUNTER — TELEPHONE (OUTPATIENT)
Dept: VASCULAR LAB | Facility: MEDICAL CENTER | Age: 79
End: 2019-08-23

## 2019-08-23 ENCOUNTER — TELEPHONE (OUTPATIENT)
Dept: CARDIOLOGY | Facility: MEDICAL CENTER | Age: 79
End: 2019-08-23

## 2019-08-23 DIAGNOSIS — I48.0 PAROXYSMAL ATRIAL FIBRILLATION (HCC): ICD-10-CM

## 2019-08-23 DIAGNOSIS — I48.92 ATRIAL FLUTTER, UNSPECIFIED TYPE (HCC): ICD-10-CM

## 2019-08-23 DIAGNOSIS — Z79.01 CHRONIC ANTICOAGULATION: ICD-10-CM

## 2019-08-23 NOTE — TELEPHONE ENCOUNTER
Sent INR order to [facility] at [phone].    Ashley Cabrera, PharmD

## 2019-08-23 NOTE — TELEPHONE ENCOUNTER
----- Message from ILAN Montes sent at 8/22/2019  4:33 PM PDT -----  Juan Salazar,    Would like to arrange JUSTIN guided cardioversion first half of next week if possible.  Orders are placed.     He is supposed to call coumadin clinic for INR testing today.     Thanks,  clint

## 2019-08-23 NOTE — TELEPHONE ENCOUNTER
----- Message from CRISTOPHER Euceda sent at 8/23/2019  8:43 AM PDT -----  Regarding: INR Results  Unsure if his INR is being checked. He has been admitted for the past 2 weeks, and is having dialysis.   States he has not received a call from the [facility] in awhile with new dosing instructions. Requesting a call back.

## 2019-08-23 NOTE — TELEPHONE ENCOUNTER
Patient scheduled for JUSTIN/Cardioversion on 8-28-19 at [facility] with Dr. Brenner at 12:45. FYI to Dr. Brenner.

## 2019-08-23 NOTE — TELEPHONE ENCOUNTER
[facility]    Patient called the clinic to inform us that [facility] will no longer draw INRs for patients. Attempted to make appt for patient to be seen in clinic at [facility], but no acceptable time available for patient.  Standing order sent in for patient to have lab drawn at [facility] for INR and we will call patient upon receiving results.     Gita FinkD

## 2019-08-23 NOTE — TELEPHONE ENCOUNTER
LM on patient's answering machine to call back to schedule cardioversion. I have time held for 8-28-19.

## 2019-08-26 ENCOUNTER — HOSPITAL ENCOUNTER (OUTPATIENT)
Dept: CARDIOLOGY | Facility: MEDICAL CENTER | Age: 79
End: 2019-08-26
Attending: INTERNAL MEDICINE
Payer: MEDICARE

## 2019-08-26 ENCOUNTER — HOSPITAL ENCOUNTER (OUTPATIENT)
Dept: LAB | Facility: MEDICAL CENTER | Age: 79
End: 2019-08-26
Attending: INTERNAL MEDICINE
Payer: MEDICARE

## 2019-08-26 ENCOUNTER — HOSPITAL ENCOUNTER (OUTPATIENT)
Dept: LAB | Facility: MEDICAL CENTER | Age: 79
End: 2019-08-26
Attending: NURSE PRACTITIONER
Payer: MEDICARE

## 2019-08-26 ENCOUNTER — TELEPHONE (OUTPATIENT)
Dept: CARDIOLOGY | Facility: MEDICAL CENTER | Age: 79
End: 2019-08-26

## 2019-08-26 ENCOUNTER — ANTICOAGULATION MONITORING (OUTPATIENT)
Dept: VASCULAR LAB | Facility: MEDICAL CENTER | Age: 79
End: 2019-08-26

## 2019-08-26 DIAGNOSIS — I48.0 PAROXYSMAL ATRIAL FIBRILLATION (HCC): ICD-10-CM

## 2019-08-26 DIAGNOSIS — I50.21 SYSTOLIC CHF, ACUTE (HCC): ICD-10-CM

## 2019-08-26 DIAGNOSIS — I35.0 NONRHEUMATIC AORTIC VALVE STENOSIS: ICD-10-CM

## 2019-08-26 DIAGNOSIS — I48.92 ATRIAL FLUTTER, UNSPECIFIED TYPE (HCC): ICD-10-CM

## 2019-08-26 DIAGNOSIS — Z79.899 HIGH RISK MEDICATION USE: ICD-10-CM

## 2019-08-26 DIAGNOSIS — Z79.01 CHRONIC ANTICOAGULATION: ICD-10-CM

## 2019-08-26 DIAGNOSIS — E78.5 DYSLIPIDEMIA: ICD-10-CM

## 2019-08-26 DIAGNOSIS — I31.4 PERICARDIAL TAMPONADE: ICD-10-CM

## 2019-08-26 LAB
ANION GAP SERPL CALC-SCNC: 17 MMOL/L (ref 0–11.9)
BUN SERPL-MCNC: 74 MG/DL (ref 8–22)
CALCIUM SERPL-MCNC: 10 MG/DL (ref 8.5–10.5)
CHLORIDE SERPL-SCNC: 99 MMOL/L (ref 96–112)
CO2 SERPL-SCNC: 25 MMOL/L (ref 20–33)
CREAT SERPL-MCNC: 9.41 MG/DL (ref 0.5–1.4)
DIGOXIN SERPL-MCNC: 0.2 NG/ML (ref 0.8–2)
FASTING STATUS PATIENT QL REPORTED: NORMAL
GLUCOSE SERPL-MCNC: 105 MG/DL (ref 65–99)
INR PPP: 2.92 (ref 0.87–1.13)
INR PPP: 2.92 (ref 2–3.5)
LV EJECT FRACT  99904: 55
LV EJECT FRACT MOD 2C 99903: 32.34
LV EJECT FRACT MOD 4C 99902: 56.98
LV EJECT FRACT MOD BP 99901: 52.46
POTASSIUM SERPL-SCNC: 4 MMOL/L (ref 3.6–5.5)
PROTHROMBIN TIME: 31.6 SEC (ref 12–14.6)
SODIUM SERPL-SCNC: 141 MMOL/L (ref 135–145)

## 2019-08-26 PROCEDURE — 93308 TTE F-UP OR LMTD: CPT

## 2019-08-26 PROCEDURE — 80162 ASSAY OF DIGOXIN TOTAL: CPT

## 2019-08-26 PROCEDURE — 36415 COLL VENOUS BLD VENIPUNCTURE: CPT

## 2019-08-26 PROCEDURE — 93308 TTE F-UP OR LMTD: CPT | Mod: 26 | Performed by: INTERNAL MEDICINE

## 2019-08-26 PROCEDURE — 85610 PROTHROMBIN TIME: CPT

## 2019-08-26 PROCEDURE — 80048 BASIC METABOLIC PNL TOTAL CA: CPT

## 2019-08-26 NOTE — TELEPHONE ENCOUNTER
WILIAM/[facility]. She can be reached at [phone].

## 2019-08-26 NOTE — TELEPHONE ENCOUNTER
WILIAM    Pt is asking if there's a certain heart med he should stop taking when in dialysis.  Ph# [phone]

## 2019-08-26 NOTE — TELEPHONE ENCOUNTER
Called and spoke with pt. He has been having low BP during dialysis. SBP has been mostly in the low 90s. He is asking if he should hold any medications with dialysis to help with this?

## 2019-08-27 ENCOUNTER — OFFICE VISIT (OUTPATIENT)
Dept: CARDIOLOGY | Facility: MEDICAL CENTER | Age: 79
End: 2019-08-27
Payer: MEDICARE

## 2019-08-27 ENCOUNTER — OFFICE VISIT (OUTPATIENT)
Dept: URGENT CARE | Facility: CLINIC | Age: 79
End: 2019-08-27
Payer: MEDICARE

## 2019-08-27 VITALS
BODY MASS INDEX: 24.25 KG/M2 | HEART RATE: 92 BPM | DIASTOLIC BLOOD PRESSURE: 54 MMHG | SYSTOLIC BLOOD PRESSURE: 100 MMHG | OXYGEN SATURATION: 92 % | HEIGHT: 68 IN | WEIGHT: 160 LBS

## 2019-08-27 VITALS
RESPIRATION RATE: 16 BRPM | DIASTOLIC BLOOD PRESSURE: 58 MMHG | SYSTOLIC BLOOD PRESSURE: 90 MMHG | HEART RATE: 72 BPM | TEMPERATURE: 99 F | OXYGEN SATURATION: 94 %

## 2019-08-27 DIAGNOSIS — L08.9 WOUND INFECTION: ICD-10-CM

## 2019-08-27 DIAGNOSIS — I10 ESSENTIAL HYPERTENSION: ICD-10-CM

## 2019-08-27 DIAGNOSIS — S60.221A CONTUSION OF RIGHT HAND, INITIAL ENCOUNTER: ICD-10-CM

## 2019-08-27 DIAGNOSIS — I35.0 NONRHEUMATIC AORTIC VALVE STENOSIS: ICD-10-CM

## 2019-08-27 DIAGNOSIS — R93.1 ELEVATED CORONARY ARTERY CALCIUM SCORE: ICD-10-CM

## 2019-08-27 DIAGNOSIS — Z79.01 CHRONIC ANTICOAGULATION: Chronic | ICD-10-CM

## 2019-08-27 DIAGNOSIS — I48.92 ATRIAL FLUTTER, UNSPECIFIED TYPE (HCC): ICD-10-CM

## 2019-08-27 DIAGNOSIS — E78.5 DYSLIPIDEMIA: ICD-10-CM

## 2019-08-27 DIAGNOSIS — I73.9 PAD (PERIPHERAL ARTERY DISEASE) (HCC): ICD-10-CM

## 2019-08-27 DIAGNOSIS — T14.8XXA WOUND INFECTION: ICD-10-CM

## 2019-08-27 PROCEDURE — 99214 OFFICE O/P EST MOD 30 MIN: CPT | Performed by: INTERNAL MEDICINE

## 2019-08-27 PROCEDURE — 99214 OFFICE O/P EST MOD 30 MIN: CPT | Performed by: FAMILY MEDICINE

## 2019-08-27 RX ORDER — DOXYCYCLINE HYCLATE 100 MG
100 TABLET ORAL EVERY 12 HOURS
Qty: 14 TAB | Refills: 0 | Status: SHIPPED | OUTPATIENT
Start: 2019-08-27 | End: 2019-09-03

## 2019-08-27 NOTE — PROGRESS NOTES
"Chief Complaint   Patient presents with   • Congestive Heart Failure     F/V       Subjective:   Ronny Castelan is a 79 y.o. male who presents today for evaluation in heart failure clinic because of recent admission with [place] nod secondary to a large pericardial effusion.  He does have a history of atrial flutter and has been considered for ablation.  His ejection fraction in June was 30% on JUSTIN.  However, during his recent admission it was 55 to 65%.  He was discharged on amiodarone therapy and warfarin anticoagulation.    He has dyspnea on rather minimal exertion.  Just walking from one room to another will make him dyspneic.  He also feels weakness in his knees.  He has had no PND, orthopnea or edema.  He denies any chest discomfort, palpitations or lightheadedness.    Past Medical History:   Diagnosis Date   • Anesthesia     \"needs more sedation\" (can sometimes hear MD during procedure)    • Anticoagulation monitoring, special range    • Aortic stenosis     mod AS- concern for low flow severe AS with rEFof 30%   • Arterial leg ulcer ([facility]) 11/8/2018   • Atrial flutter ([facility]) 6/12/2019   • Basal cell carcinoma of left cheek 3/26/2015   • BPH 7/14/2009   • Bronchitis 12/25/2018   • CAD (coronary artery disease) 2/20/2014   • CATARACT     sanjuanita surgery complete   • CHF (congestive heart failure), NYHA class II, chronic, systolic ([facility]) E F30 in setting of atrial flutter 6/13/2019   • Chronic respiratory failure with hypoxia ([facility]) 5/8/2016   • CKD (chronic kidney disease) stage 4, GFR 15-29 ml/min ([facility]) 1/15/2010    end stage renal disease   • COPD (chronic obstructive pulmonary disease) ([facility])     wears 2.5 L o2 sometimes when he sleeps   • Detached retina    • Dialysis     [place]w,[place] [facility]   • EMPHYSEMA    • Gout    • Heart burn     occas   • Heart murmur     maria esther lee cardiologist   • High cholesterol    • Hypertension    • Indigestion     occas   • Kidney cyst    • Leg pain, bilateral 8/10/2015   • On " supplemental oxygen therapy    • Peripheral vascular disease (HCC) 8/10/2015   • Pneumonia    • Primary insomnia 3/22/2018   • Proteinuria    • PVD (peripheral vascular disease) ([facility])    • Sleep apnea     O2 PER CANULA  AT NIGHT 2l/m     Past Surgical History:   Procedure Laterality Date   • AV FISTULA CREATION Right 8/6/2019    Procedure: CREATION, AV FISTULA -  RIGHT ARM  ,  and Ligation of Left Arm Fistula;  Surgeon: Sera Peters M.D.;  Location: [facility];  Service: General   • CATH PLACEMENT Right 8/6/2019    Procedure: INSERTION, CATHETER - PERMA ,;  Surgeon: Sera Peters M.D.;  Location: [facility];  Service: General   • JUSTIN  6/13/2019    Procedure: ECHOCARDIOGRAM, TRANSESOPHAGEAL;  Surgeon: Harvinder Hoang M.D.;  Location: [facility];  Service: Cardiac   • CARDIOVERSION  6/13/2019    Procedure: CARDIOVERSION;  Surgeon: Harvinder Hoang M.D.;  Location: [facility];  Service: Cardiac   • AV FISTULA CREATION Right 2/21/2019    Procedure: AV FISTULA CREATION - ARM;  Surgeon: David Cason M.D.;  Location: [facility];  Service: General   • FEMORAL ENDARTERECTOMY Left 1/25/2019    Procedure: FEMORAL ENDARTERECTOMY;  Surgeon: David Cason M.D.;  Location: [facility];  Service: General   • FEMORAL POPLITEAL BYPASS Left 1/25/2019    Procedure: LEFT FEMORAL POPLITEAL POLYTETRAFLUOROETHYLENE ePTFE(PROPATEN VASCULAR GRAFT) BYPASS;  Surgeon: David Cason M.D.;  Location: [facility];  Service: General   • ANGIOGRAM Left 1/25/2019    Procedure: LEFT LEG ANGIOGRAM;  Surgeon: David Cason M.D.;  Location: [facility];  Service: General   • ENDOSCOPY PROCEDURE  3/21/2018    Procedure: ENDOSCOPY PROCEDURE/UPPER;  Surgeon: Enrique Child D.O.;  Location: [facility];  Service: Gastroenterology   • COLONOSCOPY - ENDO  8/15/2016    Procedure: COLONOSCOPY - ENDO;  Surgeon:  Mane Whatley M.D.;  Location: ENDOSCOPY [facility];  Service:    • GASTROSCOPY WITH BIOPSY  8/13/2016    Procedure: GASTROSCOPY WITH BIOPSY;  Surgeon: Jorge Leavitt M.D.;  Location: ENDOSCOPY [facility];  Service:    • RECOVERY  12/23/2015    Procedure: VASCULAR CASE-BLANKA-LEFT ARM FISTULOGRAM WITH ANGIOPLASTY;  Surgeon: Recoveryonly Surgery;  Location: SURGERY PRE-POST PROC UN[facility];  Service:    • RECOVERY  3/24/2015    Performed by Recoveryonly Surgery at SURGERY PRE-POST PROC UNIT [facility]   • RECOVERY  7/29/2014    Performed by Ir-Recovery Surgery at SURGERY SAME DAY [facility]   • RECOVERY  3/24/2014    Performed by Ir-Recovery Surgery at SURGERY [facility]   • RECOVERY  12/17/2013    Performed by Ir-Recovery Surgery at SURGERY SAME DAY [facility]S   • RECOVERY  7/2/2013    Performed by Ir-Recovery Surgery at SURGERY SAME DAY [facility]   • AV FISTULA THROMBOLYSIS  7/2/2013    Performed by David Cason M.D. at SURGERY [facility]   • RECOVERY  1/29/2013    Performed by Ir-Recovery Surgery at SURGERY SAME DAY [facility] ORS   • RECOVERY  7/23/2012    Performed by SURGERY, IR-RECOVERY at SURGERY SAME DAY [facility] ORS   • VITRECTOMY POSTERIOR  10/11/2011    Performed by NAHUM GE at SURGERY SAME DAY [facility] ORS   • RECOVERY  8/12/2011    Performed by SURGERY, IR-RECOVERY at SURGERY SAME DAY [facility] ORS   • VITRECTOMY POSTERIOR  1/18/2011    Performed by NAHUM GE at SURGERY SAME DAY [facility]   • SCLERAL BUCKLING  1/18/2011    Performed by NAHUM GE at SURGERY SAME DAY [facility] ORS   • AV FISTULOGRAM  9/17/2010    Performed by DAVID CASON at SURGERY [facility]   • ANGIOPLASTY BALLOON  9/17/2010    Performed by ADVID CASON at SURGERY [facility]   • AV FISTULOGRAM  7/23/2010    Performed by DAVID CASON at SURGERY [facility]   • ANGIOPLASTY BALLOON  7/23/2010    Performed by DAVID CASON at SURGERY [facility]    • AV FISTULA REVISION  2/19/2010    Performed by WILLIE HATCH at SURGERY [facility] ORS   • AV FISTULA CREATION  2/12/2010    Performed by ALESHIA MOSCOSO at SURGERY [facility] ORS   • CATARACT PHACO WITH IOL  4/8/08    Performed by STACEY PHILLIPS at SURGERY SAME DAY [facility] ORS   • OTHER ORTHOPEDIC SURGERY  1998    right toe for facititis     Family History   Problem Relation Age of Onset   • Stroke Mother    • Hypertension Mother    • Lung Disease Father         Emphysema, resp failure   • Genitourinary () Problems Maternal Aunt         hematuria   • Hypertension Brother      Social History     Socioeconomic History   • Marital status:      Spouse name: Not on file   • Number of children: Not on file   • Years of education: Not on file   • Highest education level: Not on file   Occupational History   • Not on file   Social Needs   • Financial resource strain: Not on file   • Food insecurity:     Worry: Not on file     Inability: Not on file   • Transportation needs:     Medical: Not on file     Non-medical: Not on file   Tobacco Use   • Smoking status: Former Smoker     Packs/day: 1.00     Years: 40.00     Pack years: 40.00     Types: Cigarettes     Last attempt to quit: 1/1/2009     Years since quitting: 10.6   • Smokeless tobacco: Never Used   • Tobacco comment: 1 pk a day for 35 yrs, QUIT JAN 1 2010   Substance and Sexual Activity   • Alcohol use: No     Alcohol/week: 0.0 oz   • Drug use: No   • Sexual activity: Never     Partners: Female     Comment: , two sons, 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   Lifestyle   • Physical activity:     Days per week: Not on file     Minutes per session: Not on file   • Stress: Not on file   Relationships   • Social connections:     Talks on phone: Not on file     Gets together: Not on file     Attends Voodoo service: Not on file     Active member of club or organization: Not on file     Attends meetings of clubs or organizations: Not on file      "Relationship status: Not on file   • Intimate partner violence:     Fear of current or ex partner: Not on file     Emotionally abused: Not on file     Physically abused: Not on file     Forced sexual activity: Not on file   Other Topics Concern   • Not on file   Social History Narrative   • Not on file     No Known Allergies  Outpatient Encounter Medications as of 8/27/2019   Medication Sig Dispense Refill   • amiodarone (CORDARONE) 200 MG Tab Take 2 Tabs by mouth 2 Times a Day. 120 Tab 0   • tamsulosin (FLOMAX) 0.4 MG capsule Take 1 Cap by mouth ONE-HALF HOUR AFTER BREAKFAST. 90 Cap 0   • metoprolol (LOPRESSOR) 50 MG Tab Take 1 Tab by mouth 2 Times a Day. 60 Tab 0   • warfarin (COUMADIN) 5 MG Tab Take 1 Tab by mouth every day. 30 Tab 3   • omeprazole (PRILOSEC) 40 MG delayed-release capsule Take 40 mg by mouth every morning.     • calcium acetate (PHOS-LO) 667 MG Cap Take 2,001 mg by mouth 3 times a day, with meals. 2001mg three times a day with meals and 1334mg with snacks     • Fluticasone-Umeclidin-Vilant (TRELEGY ELLIPTA) 100-62.5-25 MCG/INH AEROSOL POWDER, BREATH ACTIVATED Inhale 1 Puff by mouth every day. Rinse mouth after use 1 Each 11   • levalbuterol (XOPENEX) 1.25 MG/3ML Nebu Soln 3 mL by Nebulization route every four hours as needed for Shortness of Breath. 120 Bullet 11   • pravastatin (PRAVACHOL) 20 MG Tab Take 20 mg by mouth every evening.     • midodrine (PROAMATINE) 5 MG Tab Take 3 Tabs by mouth 3 times a day, with meals. (Patient not taking: Reported on 8/27/2019) 90 Tab 0   • zolpidem (AMBIEN) 10 MG Tab Take 1 Tab by mouth every bedtime for 90 days. (Patient not taking: Reported on 8/27/2019) 90 Each 0     No facility-administered encounter medications on file as of 8/27/2019.      ROS       Objective:   /54 (BP Location: Left arm, Patient Position: Sitting, BP Cuff Size: Adult)   Pulse 92   Ht 1.727 m (5' 8\")   Wt 72.6 kg (160 lb)   SpO2 92%   BMI 24.33 kg/m²      Physical Exam "   Constitutional: He appears well-developed and well-nourished.   Neck: No JVD present.   Cardiovascular: Normal rate. An irregular rhythm present. Frequent extrasystoles are present.   Murmur (3/6 systolic at the base) heard.  Pulmonary/Chest: Effort normal and breath sounds normal. He has no rales.   Abdominal: Soft. There is no tenderness.   Musculoskeletal: He exhibits edema (2+).      Lab Results   Component Value Date/Time    CHOLSTRLTOT 90 (L) 06/22/2017 07:55 AM    LDL 26 06/22/2017 07:55 AM    HDL 57 06/22/2017 07:55 AM    TRIGLYCERIDE 37 06/22/2017 07:55 AM       Lab Results   Component Value Date/Time    SODIUM 141 08/26/2019 10:08 AM    POTASSIUM 4.0 08/26/2019 10:08 AM    CHLORIDE 99 08/26/2019 10:08 AM    CO2 25 08/26/2019 10:08 AM    GLUCOSE 105 (H) 08/26/2019 10:08 AM    BUN 74 (HH) 08/26/2019 10:08 AM    CREATININE 9.41 (HH) 08/26/2019 10:08 AM    CREATININE 2.1 (H) 05/06/2009 10:08 AM     Lab Results   Component Value Date/Time    ALKPHOSPHAT 167 (H) 08/11/2019 07:02 AM    ASTSGOT 28 08/11/2019 07:02 AM    ALTSGPT 14 08/11/2019 07:02 AM    TBILIRUBIN 0.7 08/11/2019 07:02 AM      Lab Results   Component Value Date/Time    BNPBTYPENAT 1583 (H) 04/15/2017 04:15 PM              Echocardiography Laboratory    CONCLUSIONS  Compared to the images of the study done 8/1/19 - there has been   resolution of pericardial effusion.  Left ventricular ejection fraction is visually estimated to be 55%.  Moderate to severe aortic stenosis.Vmax is 3.50  m/s.  Transvalvular gradients are - Peak: 49 mmHg, Mean:  30 mmHg.  Dimensionless index is 0.2.  Right ventricular systolic pressure is estimated to be 55 mmHg.      EDVIN GREEN  Exam Date:         08/02/2019     Limited echocardiogram from yesterday:  Pericardium  Normal pericardium without effusion.    Assessment:     1. Atrial flutter, unspecified type (HCC)     2. Essential hypertension     3. PAD (peripheral artery disease) (HCC)     4. Dyslipidemia      5. Elevated coronary artery calcium score     6. Chronic anticoagulation         Medical Decision Making:  Today's Assessment / Status / Plan:     Mr. Castelan has had no recurrence of his pericardial effusion and his ejection fraction is normalized.  He is scheduled for JUSTIN guided cardioversion tomorrow.  Ultimately, he may proceed to a flutter ablation.  His blood pressure has been somewhat low since he was taken off Midrin.  We discussed this in detail and as long as he can have dialysis he probably does not need the Midrin at this time.  His lipid status is been under excellent control.  He will follow-up in a few months.  We will refer him to valve clinic because of his moderate to severe aortic stenosis.

## 2019-08-27 NOTE — TELEPHONE ENCOUNTER
To FK, please review labs per WILIAM.     LM for pt to call with BP's , wanting to check on them and make sure he is still on metop.

## 2019-08-27 NOTE — PROGRESS NOTES
"Subjective:      Parmjit Castelan is a 79 y.o. male who presents with Bleeding/Bruising (Hit right hand over a week ago, swollen and sore)      - This is a pleasant and non toxic appearing 79 y.o. male with c/o scraped hand on something 1 wk ago. Has gotten a little red swollen past day and wound isnt healing. No NVFC             ALLERGIES:  Patient has no known allergies.     PMH:  Past Medical History:   Diagnosis Date   • Anesthesia     \"needs more sedation\" (can sometimes hear MD during procedure)    • Anticoagulation monitoring, special range    • Aortic stenosis     mod AS- concern for low flow severe AS with rEFof 30%   • Arterial leg ulcer ([facility]) 11/8/2018   • Atrial flutter ([facility]) 6/12/2019   • Basal cell carcinoma of left cheek 3/26/2015   • BPH 7/14/2009   • Bronchitis 12/25/2018   • CAD (coronary artery disease) 2/20/2014   • CATARACT     sanjuanita surgery complete   • CHF (congestive heart failure), NYHA class II, chronic, systolic ([facility]) E F30 in setting of atrial flutter 6/13/2019   • Chronic respiratory failure with hypoxia ([facility]) 5/8/2016   • CKD (chronic kidney disease) stage 4, GFR 15-29 ml/min ([facility]) 1/15/2010    end stage renal disease   • COPD (chronic obstructive pulmonary disease) ([facility])     wears 2.5 L o2 sometimes when he sleeps   • Detached retina    • Dialysis     [place],w,[place] [facility]   • EMPHYSEMA    • Gout    • Heart burn     occas   • Heart murmur     maria esther lee cardiologist   • High cholesterol    • Hypertension    • Indigestion     occas   • Kidney cyst    • Leg pain, bilateral 8/10/2015   • On supplemental oxygen therapy    • Peripheral vascular disease ([facility]) 8/10/2015   • Pneumonia    • Primary insomnia 3/22/2018   • Proteinuria    • PVD (peripheral vascular disease) ([facility])    • Sleep apnea     O2 PER CANULA  AT NIGHT 2l/m        PSH:  Past Surgical History:   Procedure Laterality Date   • AV FISTULA CREATION Right 8/6/2019    Procedure: CREATION, AV FISTULA -  RIGHT ARM  ,  and Ligation " of Left Arm Fistula;  Surgeon: Sera Peters M.D.;  Location: SURGERY [facility];  Service: General   • CATH PLACEMENT Right 8/6/2019    Procedure: INSERTION, CATHETER - PERMA ,;  Surgeon: Sera Peters M.D.;  Location: SURGERY [facility];  Service: General   • JUSTIN  6/13/2019    Procedure: ECHOCARDIOGRAM, TRANSESOPHAGEAL;  Surgeon: Harvinder Hoang M.D.;  Location: SURGERY [facility];  Service: Cardiac   • CARDIOVERSION  6/13/2019    Procedure: CARDIOVERSION;  Surgeon: Harvinder Hoang M.D.;  Location: [facility];  Service: Cardiac   • AV FISTULA CREATION Right 2/21/2019    Procedure: AV FISTULA CREATION - ARM;  Surgeon: David Cason M.D.;  Location: SURGERY [facility];  Service: General   • FEMORAL ENDARTERECTOMY Left 1/25/2019    Procedure: FEMORAL ENDARTERECTOMY;  Surgeon: David Cason M.D.;  Location: SURGERY [facility];  Service: General   • FEMORAL POPLITEAL BYPASS Left 1/25/2019    Procedure: LEFT FEMORAL POPLITEAL POLYTETRAFLUOROETHYLENE ePTFE(PROPATEN VASCULAR GRAFT) BYPASS;  Surgeon: David Cason M.D.;  Location: [facility];  Service: General   • ANGIOGRAM Left 1/25/2019    Procedure: LEFT LEG ANGIOGRAM;  Surgeon: David Cason M.D.;  Location: SURGERY [facility];  Service: General   • ENDOSCOPY PROCEDURE  3/21/2018    Procedure: ENDOSCOPY PROCEDURE/UPPER;  Surgeon: Enrique Child D.O.;  Location: [facility];  Service: Gastroenterology   • COLONOSCOPY - ENDO  8/15/2016    Procedure: COLONOSCOPY - ENDO;  Surgeon: Mane Whatley M.D.;  Location: ENDOSCOPY [facility];  Service:    • GASTROSCOPY WITH BIOPSY  8/13/2016    Procedure: GASTROSCOPY WITH BIOPSY;  Surgeon: Jorge Leavitt M.D.;  Location: ENDOSCOPY [facility];  Service:    • RECOVERY  12/23/2015    Procedure: VASCULAR CASE-[place]-LEFT ARM FISTULOGRAM WITH ANGIOPLASTY;  Surgeon: Recoveryonly Surgery;  Location: SURGERY PRE-POST  PROC UNIT [facility];  Service:    • RECOVERY  3/24/2015    Performed by Recoveryonly Surgery at SURGERY PRE-POST PROC UNIT [facility]   • RECOVERY  7/29/2014    Performed by Ir-Recovery Surgery at SURGERY SAME DAY [facility] ORS   • RECOVERY  3/24/2014    Performed by Ir-Recovery Surgery at SURGERY [facility]   • RECOVERY  12/17/2013    Performed by Ir-Recovery Surgery at SURGERY SAME DAY [facility]S   • RECOVERY  7/2/2013    Performed by Ir-Recovery Surgery at SURGERY SAME DAY [facility]   • AV FISTULA THROMBOLYSIS  7/2/2013    Performed by David Cason M.D. at SURGERY [facility]   • RECOVERY  1/29/2013    Performed by Ir-Recovery Surgery at SURGERY SAME DAY [facility] ORS   • RECOVERY  7/23/2012    Performed by SURGERY, IR-RECOVERY at SURGERY SAME DAY [facility] ORS   • VITRECTOMY POSTERIOR  10/11/2011    Performed by NAHUM GE at SURGERY SAME DAY [facility] ORS   • RECOVERY  8/12/2011    Performed by SURGERY, IR-RECOVERY at SURGERY SAME DAY [facility] ORS   • VITRECTOMY POSTERIOR  1/18/2011    Performed by NAHUM GE at SURGERY SAME DAY [facility] ORS   • SCLERAL BUCKLING  1/18/2011    Performed by NAHUM GE at SURGERY SAME DAY [facility] ORS   • AV FISTULOGRAM  9/17/2010    Performed by DAVID CASON at SURGERY [facility] ORS   • ANGIOPLASTY BALLOON  9/17/2010    Performed by DAVID CASON at SURGERY [facility] ORS   • AV FISTULOGRAM  7/23/2010    Performed by DAVID CASON at SURGERY [facility] ORS   • ANGIOPLASTY BALLOON  7/23/2010    Performed by DAVID CASON at SURGERY [facility] ORS   • AV FISTULA REVISION  2/19/2010    Performed by WILLIE HATCH at SURGERY [facility] ORS   • AV FISTULA CREATION  2/12/2010    Performed by DAVID CASON at SURGERY [facility]   • CATARACT PHACO WITH IOL  4/8/08    Performed by STACEY PHILLIPS at SURGERY SAME DAY [facility]   • OTHER ORTHOPEDIC SURGERY  1998    right toe for facititis       MEDS:    Current Outpatient  Medications:   •  mupirocin (BACTROBAN) 2 % Ointment, Apply 1 Application to affected area(s) 2 times a day for 7 days., Disp: 30 g, Rfl: 1  •  doxycycline (VIBRAMYCIN) 100 MG Tab, Take 1 Tab by mouth every 12 hours for 7 days., Disp: 14 Tab, Rfl: 0  •  amiodarone (CORDARONE) 200 MG Tab, Take 2 Tabs by mouth 2 Times a Day., Disp: 120 Tab, Rfl: 0  •  tamsulosin (FLOMAX) 0.4 MG capsule, Take 1 Cap by mouth ONE-HALF HOUR AFTER BREAKFAST., Disp: 90 Cap, Rfl: 0  •  midodrine (PROAMATINE) 5 MG Tab, Take 3 Tabs by mouth 3 times a day, with meals., Disp: 90 Tab, Rfl: 0  •  metoprolol (LOPRESSOR) 50 MG Tab, Take 1 Tab by mouth 2 Times a Day., Disp: 60 Tab, Rfl: 0  •  zolpidem (AMBIEN) 10 MG Tab, Take 1 Tab by mouth every bedtime for 90 days., Disp: 90 Each, Rfl: 0  •  warfarin (COUMADIN) 5 MG Tab, Take 1 Tab by mouth every day., Disp: 30 Tab, Rfl: 3  •  omeprazole (PRILOSEC) 40 MG delayed-release capsule, Take 40 mg by mouth every morning., Disp: , Rfl:   •  levalbuterol (XOPENEX) 1.25 MG/3ML Nebu Soln, 3 mL by Nebulization route every four hours as needed for Shortness of Breath., Disp: 120 Bullet, Rfl: 11  •  pravastatin (PRAVACHOL) 20 MG Tab, Take 20 mg by mouth every evening., Disp: , Rfl:   •  calcium acetate (PHOS-LO) 667 MG Cap, Take 2,001 mg by mouth 3 times a day, with meals. 2001mg three times a day with meals and 1334mg with snacks, Disp: , Rfl:   •  Fluticasone-Umeclidin-Vilant (TRELEGY ELLIPTA) 100-62.5-25 MCG/INH AEROSOL POWDER, BREATH ACTIVATED, Inhale 1 Puff by mouth every day. Rinse mouth after use, Disp: 1 Each, Rfl: 11    ** I have documented what I find to be significant in regards to past medical, social, family and surgical history  in my HPI or under PMH/PSH/FH review section, otherwise it is contributory **           HPI    Review of Systems   Musculoskeletal: Positive for joint pain.   Skin:        Wound hand    All other systems reviewed and are negative.         Objective:     BP (!) 90/58   Pulse  72   Temp 37.2 °C (99 °F) (Temporal)   Resp 16   SpO2 94%      Physical Exam   Constitutional: He appears well-developed and well-nourished. No distress.   HENT:   Head: Normocephalic and atraumatic.   Eyes: Conjunctivae are normal.   Cardiovascular: Normal heart sounds.   No murmur heard.  Pulmonary/Chest: Effort normal and breath sounds normal. No respiratory distress.   Neurological: He is alert. He exhibits normal muscle tone.   Skin: Skin is warm and dry. He is not diaphoretic.   Psychiatric: He has a normal mood and affect. Judgment normal.   Nursing note and vitals reviewed.    Rt hand: dorsal aspect w/ healing skin tear ~2x1.5cm w/ a little edema and some slight erythema around wound           Assessment/Plan:         1. Contusion of right hand, initial encounter     2. Wound infection  mupirocin (BACTROBAN) 2 % Ointment    doxycycline (VIBRAMYCIN) 100 MG Tab       - rest/hydrate   - wound care discussed       Dx & d/c instructions discussed w/ patient and/or family members.     Follow up with PCP (or here if PCP unavailable) in 2-3 days for wound check, ER if feeling/getting worse.    Any realistic and/or common medication side effects that may have been given today(i.e. Rash, GI upset/constipation, sedation, elevation of BP or blood sugars) reviewed.     Patient left in stable condition

## 2019-08-27 NOTE — TELEPHONE ENCOUNTER
Pt has ESRD, high BUN/CR not abnormal, dialysis MWF.  BMP ordered by FK, please forward to him as well.   If BP staying in the 90s with dialysis that is ok.  He is on Metoprolol but would like to try to keep on as he needs for rate control measures.   Connie

## 2019-08-28 ENCOUNTER — ANESTHESIA EVENT (OUTPATIENT)
Dept: CARDIOLOGY | Facility: MEDICAL CENTER | Age: 79
End: 2019-08-28
Payer: MEDICARE

## 2019-08-28 ENCOUNTER — APPOINTMENT (OUTPATIENT)
Dept: CARDIOLOGY | Facility: MEDICAL CENTER | Age: 79
End: 2019-08-28
Attending: NURSE PRACTITIONER
Payer: MEDICARE

## 2019-08-28 ENCOUNTER — TELEPHONE (OUTPATIENT)
Dept: CARDIOLOGY | Facility: MEDICAL CENTER | Age: 79
End: 2019-08-28

## 2019-08-28 ENCOUNTER — HOSPITAL ENCOUNTER (OUTPATIENT)
Facility: MEDICAL CENTER | Age: 79
End: 2019-08-28
Attending: INTERNAL MEDICINE | Admitting: INTERNAL MEDICINE
Payer: MEDICARE

## 2019-08-28 ENCOUNTER — ANESTHESIA (OUTPATIENT)
Dept: CARDIOLOGY | Facility: MEDICAL CENTER | Age: 79
End: 2019-08-28
Payer: MEDICARE

## 2019-08-28 VITALS
TEMPERATURE: 97.6 F | HEART RATE: 45 BPM | HEIGHT: 68 IN | SYSTOLIC BLOOD PRESSURE: 99 MMHG | OXYGEN SATURATION: 98 % | BODY MASS INDEX: 24.73 KG/M2 | WEIGHT: 163.14 LBS | DIASTOLIC BLOOD PRESSURE: 38 MMHG | RESPIRATION RATE: 17 BRPM

## 2019-08-28 DIAGNOSIS — I48.0 PAROXYSMAL ATRIAL FIBRILLATION (HCC): ICD-10-CM

## 2019-08-28 DIAGNOSIS — I48.92 ATRIAL FLUTTER, UNSPECIFIED TYPE (HCC): ICD-10-CM

## 2019-08-28 LAB
EKG IMPRESSION: NORMAL
EKG IMPRESSION: NORMAL
ERYTHROCYTE [DISTWIDTH] IN BLOOD BY AUTOMATED COUNT: 65.1 FL (ref 35.9–50)
HCT VFR BLD AUTO: 29.5 % (ref 42–52)
HGB BLD-MCNC: 8.7 G/DL (ref 14–18)
INR PPP: 3.49 (ref 0.87–1.13)
LV EJECT FRACT  99904: 60
MCH RBC QN AUTO: 30 PG (ref 27–33)
MCHC RBC AUTO-ENTMCNC: 29.5 G/DL (ref 33.7–35.3)
MCV RBC AUTO: 101.7 FL (ref 81.4–97.8)
PLATELET # BLD AUTO: 204 K/UL (ref 164–446)
PMV BLD AUTO: 10.4 FL (ref 9–12.9)
POTASSIUM SERPL-SCNC: 4.3 MMOL/L (ref 3.6–5.5)
PROTHROMBIN TIME: 36.4 SEC (ref 12–14.6)
RBC # BLD AUTO: 2.9 M/UL (ref 4.7–6.1)
WBC # BLD AUTO: 3.7 K/UL (ref 4.8–10.8)

## 2019-08-28 PROCEDURE — 93312 ECHO TRANSESOPHAGEAL: CPT | Mod: 26 | Performed by: INTERNAL MEDICINE

## 2019-08-28 PROCEDURE — 85610 PROTHROMBIN TIME: CPT

## 2019-08-28 PROCEDURE — 700111 HCHG RX REV CODE 636 W/ 250 OVERRIDE (IP): Performed by: ANESTHESIOLOGY

## 2019-08-28 PROCEDURE — 160002 HCHG RECOVERY MINUTES (STAT)

## 2019-08-28 PROCEDURE — 4410588 CL-CARDIOVERSION

## 2019-08-28 PROCEDURE — 93325 DOPPLER ECHO COLOR FLOW MAPG: CPT | Mod: 26 | Performed by: INTERNAL MEDICINE

## 2019-08-28 PROCEDURE — 93005 ELECTROCARDIOGRAM TRACING: CPT | Performed by: INTERNAL MEDICINE

## 2019-08-28 PROCEDURE — 93325 DOPPLER ECHO COLOR FLOW MAPG: CPT

## 2019-08-28 PROCEDURE — 93010 ELECTROCARDIOGRAM REPORT: CPT | Mod: 76 | Performed by: INTERNAL MEDICINE

## 2019-08-28 PROCEDURE — 85027 COMPLETE CBC AUTOMATED: CPT

## 2019-08-28 PROCEDURE — 92960 CARDIOVERSION ELECTRIC EXT: CPT | Performed by: INTERNAL MEDICINE

## 2019-08-28 PROCEDURE — 93010 ELECTROCARDIOGRAM REPORT: CPT | Performed by: INTERNAL MEDICINE

## 2019-08-28 PROCEDURE — 84132 ASSAY OF SERUM POTASSIUM: CPT

## 2019-08-28 PROCEDURE — 93320 DOPPLER ECHO COMPLETE: CPT | Mod: 26 | Performed by: INTERNAL MEDICINE

## 2019-08-28 RX ORDER — DIPHENHYDRAMINE HYDROCHLORIDE 50 MG/ML
12.5 INJECTION INTRAMUSCULAR; INTRAVENOUS
Status: DISCONTINUED | OUTPATIENT
Start: 2019-08-28 | End: 2019-08-28 | Stop reason: HOSPADM

## 2019-08-28 RX ORDER — HALOPERIDOL 5 MG/ML
1 INJECTION INTRAMUSCULAR
Status: DISCONTINUED | OUTPATIENT
Start: 2019-08-28 | End: 2019-08-28 | Stop reason: HOSPADM

## 2019-08-28 RX ORDER — OXYCODONE HCL 5 MG/5 ML
10 SOLUTION, ORAL ORAL
Status: DISCONTINUED | OUTPATIENT
Start: 2019-08-28 | End: 2019-08-28 | Stop reason: HOSPADM

## 2019-08-28 RX ORDER — HYDROMORPHONE HYDROCHLORIDE 2 MG/ML
0.1 INJECTION, SOLUTION INTRAMUSCULAR; INTRAVENOUS; SUBCUTANEOUS
Status: DISCONTINUED | OUTPATIENT
Start: 2019-08-28 | End: 2019-08-28 | Stop reason: HOSPADM

## 2019-08-28 RX ORDER — SODIUM CHLORIDE, SODIUM LACTATE, POTASSIUM CHLORIDE, CALCIUM CHLORIDE 600; 310; 30; 20 MG/100ML; MG/100ML; MG/100ML; MG/100ML
INJECTION, SOLUTION INTRAVENOUS CONTINUOUS
Status: DISCONTINUED | OUTPATIENT
Start: 2019-08-28 | End: 2019-08-28 | Stop reason: HOSPADM

## 2019-08-28 RX ORDER — HYDROMORPHONE HYDROCHLORIDE 2 MG/ML
0.2 INJECTION, SOLUTION INTRAMUSCULAR; INTRAVENOUS; SUBCUTANEOUS
Status: DISCONTINUED | OUTPATIENT
Start: 2019-08-28 | End: 2019-08-28 | Stop reason: HOSPADM

## 2019-08-28 RX ORDER — ONDANSETRON 2 MG/ML
4 INJECTION INTRAMUSCULAR; INTRAVENOUS
Status: DISCONTINUED | OUTPATIENT
Start: 2019-08-28 | End: 2019-08-28 | Stop reason: HOSPADM

## 2019-08-28 RX ORDER — OXYCODONE HCL 5 MG/5 ML
5 SOLUTION, ORAL ORAL
Status: DISCONTINUED | OUTPATIENT
Start: 2019-08-28 | End: 2019-08-28 | Stop reason: HOSPADM

## 2019-08-28 RX ORDER — MEPERIDINE HYDROCHLORIDE 25 MG/ML
6.25 INJECTION INTRAMUSCULAR; INTRAVENOUS; SUBCUTANEOUS
Status: DISCONTINUED | OUTPATIENT
Start: 2019-08-28 | End: 2019-08-28 | Stop reason: HOSPADM

## 2019-08-28 RX ORDER — HYDROMORPHONE HYDROCHLORIDE 2 MG/ML
0.4 INJECTION, SOLUTION INTRAMUSCULAR; INTRAVENOUS; SUBCUTANEOUS
Status: DISCONTINUED | OUTPATIENT
Start: 2019-08-28 | End: 2019-08-28 | Stop reason: HOSPADM

## 2019-08-28 RX ADMIN — PROPOFOL 50 MG: 10 INJECTION, EMULSION INTRAVENOUS at 12:41

## 2019-08-28 RX ADMIN — PROPOFOL 50 MG: 10 INJECTION, EMULSION INTRAVENOUS at 12:46

## 2019-08-28 RX ADMIN — PROPOFOL 50 MG: 10 INJECTION, EMULSION INTRAVENOUS at 12:50

## 2019-08-28 ASSESSMENT — PAIN SCALES - GENERAL: PAIN_LEVEL: 0

## 2019-08-28 NOTE — ANESTHESIA POSTPROCEDURE EVALUATION
Patient: Parmjit Castelan    Procedure Summary     Date:  08/28/19 Room / Location:  [facility] ECHOCARDIOLOGY [facility]    Anesthesia Start:  1240 Anesthesia Stop:  1303    Procedures:       CL-CARDIOVERSION      EC-JUSTIN W/O CONT Diagnosis:       Atrial flutter, unspecified type (HCC)      Paroxysmal atrial fibrillation (HCC)      Unspecified atrial flutter      (See Associated Dx)      (early next week)    Scheduled Providers:  Rita Brenner M.D. Responsible Provider:  Connor Allred M.D.    Anesthesia Type:  general ASA Status:  3          Final Anesthesia Type: general  Last vitals  BP   Blood Pressure : 111/57    Temp   36.4 °C (97.5 °F)    Pulse   Pulse: 98   Resp   18    SpO2   100 %      Anesthesia Post Evaluation    Patient location during evaluation: PACU  Patient participation: complete - patient participated  Level of consciousness: awake and alert  Pain score: 0    Airway patency: patent  Anesthetic complications: no  Cardiovascular status: hemodynamically stable  Respiratory status: acceptable  Hydration status: euvolemic    PONV: none           Nurse Pain Score: 4 (NPRS)

## 2019-08-28 NOTE — ANESTHESIA TIME REPORT
Anesthesia Start and Stop Event Times     Date Time Event    8/28/2019 1212 Ready for Procedure     1240 Anesthesia Start     1303 Anesthesia Stop        Responsible Staff  08/28/19    Name Role Begin End    Connor Allred M.D. Anesth 1240 1303        Preop Diagnosis (Free Text):  Pre-op Diagnosis             Preop Diagnosis (Codes):    Post op Diagnosis  Atrial flutter (HCC)      Premium Reason  Non-Premium    Comments:

## 2019-08-28 NOTE — OR NURSING
1307 Patient arrived from cath lab s/p JUSTIN/cardioversion, patient arousable by calling. bp low, report received from Dr jenkins, SB 40s.  1340 patient fully awake, tolerating oral fluids   1415 Criteria met to discharge patient home.   1420 discharge instructions given to patient. Patient verbalize understanding of the orders, reviewed activity worsening symptoms, follow up, medications.  1425 patient escorted via w/c with all his personal belongings.

## 2019-08-28 NOTE — TELEPHONE ENCOUNTER
Called pt back, confirmed Amiodarone dose 400mg twice daily, verbalized understanding and appreciative of call.

## 2019-08-28 NOTE — ANESTHESIA PREPROCEDURE EVALUATION
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HCC)   (+) AVM (arteriovenous malformation)   (+) Aortic stenosis   (+) Atrial flutter (HCC)   (+) Elevated coronary artery calcium score   (+) Essential hypertension   (+) Paroxysmal atrial fibrillation (HCC)         (+) ESRD (end stage renal disease) on dialysis (HCC)   (+) Hyperkalemia, diminished renal excretion   (+) Renal cyst   (+) Uremia      Other   (+) Uric acid arthropathy     Vitals:    08/28/19 1115   BP: 111/57   Pulse: 98   Resp: 18   Temp: 36.4 °C (97.5 °F)   SpO2: 100%         Physical Exam    Airway   Mallampati: II  TM distance: >3 FB  Neck ROM: full       Cardiovascular - normal exam  Rhythm: regular  Rate: normal  (-) murmur     Dental - normal exam         Pulmonary - normal exam  Breath sounds clear to auscultation     Abdominal    Neurological - normal exam                 Anesthesia Plan    ASA 3       Plan - general       Airway plan will be natural airway        Induction: intravenous      Pertinent diagnostic labs and testing reviewed    Informed Consent:    Anesthetic plan and risks discussed with patient.    Use of blood products discussed with: patient whom consented to blood products.

## 2019-08-28 NOTE — PROCEDURES
Patient was sedated per Anesthesia. Please see their note for complete details.     JUSTIN was performed and did not show any LA/ALVAREZ thrombus, sludge or spontaneous echo contrast.   Formal JUSTIN note to follow.     We then proceeded with the cardioversion.   After adequate sedation, 200J of synchronized cardioversion was attempted, resulting in sinus rhythm.     Continue current medication regimen.     Complications: none     Rita rBenner MD  Cardiologist  [facility]

## 2019-08-28 NOTE — TELEPHONE ENCOUNTER
FK    Pt was just discharged from hospital today and wants to clarify dose for amiodarone. Pt can be reached back at [phone] or [phone].

## 2019-08-28 NOTE — ANESTHESIA QCDR
2019 [facility] Clinical Data Registry (for Quality Improvement)     Postoperative nausea/vomiting risk protocol (Adult = 18 yrs and Pediatric 3-17 yrs)- (430 and 463)  General inhalation anesthetic (NOT TIVA) with PONV risk factors: No  Provision of anti-emetic therapy with at least 2 different classes of agents: N/A  Patient DID NOT receive anti-emetic therapy and reason is documented in Medical Record: N/A    Multimodal Pain Management- (AQI59)  Patient undergoing Elective Surgery (i.e. Outpatient, or ASC, or Prescheduled Surgery prior to Hospital Admission): No  Use of Multimodal Pain Management, two or more drugs and/or interventions, NOT including systemic opioids: N/A  Exception: Documented allergy to multiple classes of analgesics: N/A    PACU assessment of acute postoperative pain prior to Anesthesia Care End- Applies to Patients Age = 18- (ABG7)  Initial PACU pain score is which of the following: < 7/10  Patient unable to report pain score: N/A    Post-anesthetic transfer of care checklist/protocol to PACU/ICU- (426 and 427)  Upon conclusion of case, patient transferred to which of the following locations: PACU/Non-ICU  Use of transfer checklist/protocol: Yes  Exclusion: Service Performed in Patient Hospital Room (and thus did not require transfer): N/A    PACU Reintubation- (AQI31)  General anesthesia requiring endotracheal intubation (ETT) along with subsequent extubation in OR or PACU: No  Required reintubation in the PACU: N/A  Extubation was a planned trial documented in the medical record prior to removal of the original airway device: N/A    Unplanned admission to ICU related to anesthesia service up through end of PACU care- (MD51)  Unplanned admission to ICU (not initially anticipated at anesthesia start time): No

## 2019-08-28 NOTE — DISCHARGE INSTRUCTIONS
ACTIVITY: Rest and take it easy for the first 24 hours.  A responsible adult is recommended to remain with you during that time.  It is normal to feel sleepy.  We encourage you to not do anything that requires balance, judgment or coordination.    MILD FLU-LIKE SYMPTOMS ARE NORMAL. YOU MAY EXPERIENCE GENERALIZED MUSCLE ACHES, THROAT IRRITATION, HEADACHE AND/OR SOME NAUSEA.    FOR 24 HOURS DO NOT:  Drive, operate machinery or run household appliances.  Drink beer or alcoholic beverages.   Make important decisions or sign legal documents.    SPECIAL INSTRUCTIONS: follow up with your cardiologist call find out when to follow up.  Resume your home medications  If you experience chest pain, shorteness of breath call 911 return to ER     DIET: To avoid nausea, slowly advance diet as tolerated, avoiding spicy or greasy foods for the first day.  Add more substantial food to your diet according to your physician's instructions.  Babies can be fed formula or breast milk as soon as they are hungry.  INCREASE FLUIDS AND FIBER TO AVOID CONSTIPATION.    FOLLOW-UP APPOINTMENT:  A follow-up appointment should be arranged with your doctor in [phone]; call to schedule.    You should CALL YOUR PHYSICIAN if you develop:  Fever greater than 101 degrees F.  Pain not relieved by medication, or persistent nausea or vomiting.  Excessive bleeding (blood soaking through dressing) or unexpected drainage from the wound.  Extreme redness or swelling around the incision site, drainage of pus or foul smelling drainage.  Inability to urinate or empty your bladder within 8 hours.  Problems with breathing or chest pain.    You should call 911 if you develop problems with breathing or chest pain.  If you are unable to contact your doctor or surgical center, you should go to the nearest emergency room or urgent care center.  Physician's telephone #: [phone]    If any questions arise, call your doctor.  If your doctor is not available, please feel  free to call the Surgical Center at [phone]  The Center is open Monday through Friday from 7AM to 7PM.  You can also call the HEALTH HOTLINE open 24 hours/day, 7 days/week and speak to a nurse at [phone], or toll free at [phone].    A registered nurse may call you a few days after your surgery to see how you are doing after your procedure.    MEDICATIONS: Resume taking daily medication.  Take prescribed pain medication with food.  If no medication is prescribed, you may take non-aspirin pain medication if needed.  PAIN MEDICATION CAN BE VERY CONSTIPATING.  Take a stool softener or laxative such as senokot, pericolace, or milk of magnesia if needed.  Electrical Cardioversion  Electrical cardioversion is the delivery of a jolt of electricity to restore a normal rhythm to the heart. A rhythm that is too fast or is not regular keeps the heart from pumping well. In this procedure, sticky patches or metal paddles are placed on the chest to deliver electricity to the heart from a device.  This procedure may be done in an emergency if:  · There is low or no blood pressure as a result of the heart rhythm.  · Normal rhythm must be restored as fast as possible to protect the brain and heart from further damage.  · It may save a life.  This procedure may also be done for irregular or fast heart rhythms that are not immediately life-threatening.  Tell a health care provider about:  · Any allergies you have.  · All medicines you are taking, including vitamins, herbs, eye drops, creams, and over-the-counter medicines.  · Any problems you or family members have had with anesthetic medicines.  · Any blood disorders you have.  · Any surgeries you have had.  · Any medical conditions you have.  · Whether you are pregnant or may be pregnant.  What are the risks?  Generally, this is a safe procedure. However, problems may occur, including:  · Allergic reactions to medicines.  · A blood clot that breaks free and  travels to other parts of your body.  · The possible return of an abnormal heart rhythm within hours or days after the procedure.  · Your heart stopping (cardiac arrest ). This is rare.  What happens before the procedure?  Medicines  · Your health care provider may have you start taking:  ¨ Blood-thinning medicines (anticoagulants) so your blood does not clot as easily.  ¨ Medicines may be given to help stabilize your heart rate and rhythm.  · Ask your health care provider about changing or stopping your regular medicines. This is especially important if you are taking diabetes medicines or blood thinners.  General instructions  · Plan to have someone take you home from the hospital or clinic.  · If you will be going home right after the procedure, plan to have someone with you for 24 hours.  · Follow instructions from your health care provider about eating or drinking restrictions.  What happens during the procedure?  · To lower your risk of infection:  ¨ Your health care team will wash or sanitize their hands.  ¨ Your skin will be washed with soap.  · An IV tube will be inserted into one of your veins.  · You will be given a medicine to help you relax (sedative).  · Sticky patches (electrodes) or metal paddles may be placed on your chest.  · An electrical shock will be delivered.  The procedure may vary among health care providers and hospitals.  What happens after the procedure?  · Your blood pressure, heart rate, breathing rate, and blood oxygen level will be monitored until the medicines you were given have worn off.  · Do not drive for 24 hours if you were given a sedative.  · Your heart rhythm will be watched to make sure it does not change.  This information is not intended to replace advice given to you by your health care provider. Make sure you discuss any questions you have with your health care provider.  Document Released: 12/08/2003 Document Revised: 08/16/2017 Document Reviewed: 06/23/2017  [employer]  Interactive Patient Education © 2017 [employer]    Transesophageal Echocardiogram  Transesophageal echocardiography (JUSTIN) is a picture test of your heart using sound waves. The pictures taken can give very detailed pictures of your heart. This can help your doctor see if there are problems with your heart. JUSTIN can check:  · If your heart has blood clots in it.  · How well your heart valves are working.  · If you have an infection on the inside of your heart.  · Some of the major arteries of your heart.  · If your heart valve is working after a repair.  · Your heart before a procedure that uses a shock to your heart to get the rhythm back to normal.  What happens before the procedure?  · Do not eat or drink for 6 hours before the procedure or as told by your doctor.  · Make plans to have someone drive you home after the procedure. Do not drive yourself home.  · An IV tube will be put in your arm.  What happens during the procedure?  · You will be given a medicine to help you relax (sedative). It will be given through the IV tube.  · A numbing medicine will be sprayed or gargled in the back of your throat to help numb it.  · The tip of the probe is placed into the back of your mouth. You will be asked to swallow. This helps to pass the probe into your esophagus.  · Once the tip of the probe is in the right place, your doctor can take pictures of your heart.  · You may feel pressure at the back of your throat.  What happens after the procedure?  · You will be taken to a recovery area so the sedative can wear off.  · Your throat may be sore and scratchy. This will go away slowly over time.  · You will go home when you are fully awake and able to swallow liquids.  · You should have someone stay with you for the next 24 hours.  · Do not drive or operate machinery for the next 24 hours.  This information is not intended to replace advice given to you by your health care provider. Make sure you discuss any questions you  have with your health care provider.  Document Released: 10/15/2010 Document Revised: 05/25/2017 Document Reviewed: 06/19/2014  [employer] Interactive Patient Education © 2017 [employer]  If your physician has prescribed pain medication that includes Acetaminophen (Tylenol), do not take additional Acetaminophen (Tylenol) while taking the prescribed medication.    Depression / Suicide Risk    As you are discharged from this [facility] facility, it is important to learn how to keep safe from harming yourself.    Recognize the warning signs:  · Abrupt changes in personality, positive or negative- including increase in energy   · Giving away possessions  · Change in eating patterns- significant weight changes-  positive or negative  · Change in sleeping patterns- unable to sleep or sleeping all the time   · Unwillingness or inability to communicate  · Depression  · Unusual sadness, discouragement and loneliness  · Talk of wanting to die  · Neglect of personal appearance   · Rebelliousness- reckless behavior  · Withdrawal from people/activities they love  · Confusion- inability to concentrate     If you or a loved one observes any of these behaviors or has concerns about self-harm, here's what you can do:  · Talk about it- your feelings and reasons for harming yourself  · Remove any means that you might use to hurt yourself (examples: pills, rope, extension cords, firearm)  · Get professional help from the community (Mental Health, Substance Abuse, psychological counseling)  · Do not be alone:Call your Safe Contact- someone whom you trust who will be there for you.  · Call your local CRISIS HOTLINE 164-5364 or 130-354-8160  · Call your local Children's Mobile Crisis Response Team [place] (572) 728-9366 or www.Privcap  · Call the toll free National Suicide Prevention Hotlines   · National Suicide Prevention Lifeline 615-898-ZIWV (1993)  · National Hope Line Network 800-SUICIDE (190-9260)

## 2019-08-29 ENCOUNTER — ANTICOAGULATION MONITORING (OUTPATIENT)
Dept: MEDICAL GROUP | Facility: MEDICAL CENTER | Age: 79
End: 2019-08-29

## 2019-08-29 DIAGNOSIS — I48.92 ATRIAL FLUTTER, UNSPECIFIED TYPE (HCC): ICD-10-CM

## 2019-08-29 NOTE — PROGRESS NOTES
OP Telephone Anticoagulation Service Note    Date: 8/29/2019      Anticoagulation Summary  As of 8/29/2019    INR goal:   2.0-3.0   TTR:   48.3 % (1.1 mo)   INR used for dosing:   3.49! (8/28/2019)   Warfarin maintenance plan:   5 mg (5 mg x 1) every day   Weekly warfarin total:   35 mg   Plan last modified:   Gaudencio Page, PharmD (7/2/2019)   Next INR check:   9/4/2019   Target end date:   Indefinite    Indications    Atrial flutter (HCC) [I48.92]             Anticoagulation Episode Summary     INR check location:   Anticoagulation Clinic    Preferred lab:       Send INR reminders to:       Comments:   [facility] lab      Anticoagulation Care Providers     Provider Role Specialty Phone number    [facility]   [phone]    Martha Light M.D.  Family Medicine [phone]        Anticoagulation Patient Findings        Plan: Spoke with patient on the phone. Patient is supra therapeutic today. Confirmed dosing. No missed tablets in the last week. Patient reports dose of amiodarone dose lowered. Patient denies any signs or symptoms of bleeding or clotting. Instructed patient to call clinic if any unusual bleeding or bruising occurs. Will have pt take 2.5 mg today then continue dosing as outlined. Will follow-up with patient in 1 week(s).    juliann no longer drawing PT/INR, pt offered  clinic but declines and will go to [facility] lab.       Annabel Lynn, DanaeD

## 2019-08-30 ENCOUNTER — TELEPHONE (OUTPATIENT)
Dept: CARDIOLOGY | Facility: MEDICAL CENTER | Age: 79
End: 2019-08-30

## 2019-08-30 NOTE — TELEPHONE ENCOUNTER
"FK/Mirtha      Patient is currently at dialysis and his blood pressure \"plummeted down to 77\". He wants to know if this is from his medication. Pt. #[phone].  "

## 2019-08-30 NOTE — TELEPHONE ENCOUNTER
Called pt back, pt stated he is concerned that his BP 77/55, HR 66  prior to starting dialysis. Pt currently asymptomatic and about 30 minutes into procedure.    Called pt's nurse Antoni at [facility]. Per Antoni, Dr. Montgomery gave ok to proceed with dialysis. Advised Antoni to notify pt in the future if he needs to hold his BP medications prior to procedure.

## 2019-09-04 ENCOUNTER — HOSPITAL ENCOUNTER (OUTPATIENT)
Dept: LAB | Facility: MEDICAL CENTER | Age: 79
End: 2019-09-04
Attending: NURSE PRACTITIONER
Payer: MEDICARE

## 2019-09-04 DIAGNOSIS — I48.92 ATRIAL FLUTTER, UNSPECIFIED TYPE (HCC): ICD-10-CM

## 2019-09-04 LAB
INR PPP: 4.47 (ref 0.87–1.13)
PROTHROMBIN TIME: 44.4 SEC (ref 12–14.6)

## 2019-09-04 PROCEDURE — 85610 PROTHROMBIN TIME: CPT

## 2019-09-04 PROCEDURE — 36415 COLL VENOUS BLD VENIPUNCTURE: CPT

## 2019-09-05 ENCOUNTER — OFFICE VISIT (OUTPATIENT)
Dept: CARDIOLOGY | Facility: MEDICAL CENTER | Age: 79
End: 2019-09-05
Payer: MEDICARE

## 2019-09-05 ENCOUNTER — ANTICOAGULATION MONITORING (OUTPATIENT)
Dept: VASCULAR LAB | Facility: MEDICAL CENTER | Age: 79
End: 2019-09-05

## 2019-09-05 VITALS
DIASTOLIC BLOOD PRESSURE: 50 MMHG | HEART RATE: 54 BPM | HEIGHT: 68 IN | WEIGHT: 162.48 LBS | SYSTOLIC BLOOD PRESSURE: 136 MMHG | BODY MASS INDEX: 24.62 KG/M2 | OXYGEN SATURATION: 90 %

## 2019-09-05 DIAGNOSIS — I48.92 ATRIAL FLUTTER, UNSPECIFIED TYPE (HCC): ICD-10-CM

## 2019-09-05 LAB — EKG IMPRESSION: NORMAL

## 2019-09-05 PROCEDURE — 99214 OFFICE O/P EST MOD 30 MIN: CPT | Performed by: NURSE PRACTITIONER

## 2019-09-05 PROCEDURE — 93000 ELECTROCARDIOGRAM COMPLETE: CPT | Performed by: INTERNAL MEDICINE

## 2019-09-05 RX ORDER — AMIODARONE HYDROCHLORIDE 200 MG/1
200 TABLET ORAL 2 TIMES DAILY
Qty: 60 TAB | Refills: 11 | COMMUNITY
Start: 2019-09-05 | End: 2019-10-10

## 2019-09-05 ASSESSMENT — ENCOUNTER SYMPTOMS
FEVER: 0
PALPITATIONS: 0
ORTHOPNEA: 1
SHORTNESS OF BREATH: 0
COUGH: 0
CLAUDICATION: 0
PND: 0
MYALGIAS: 0
DIZZINESS: 0
ABDOMINAL PAIN: 0

## 2019-09-05 NOTE — PATIENT INSTRUCTIONS
We will continue your amiodarone at 200 mg twice a day alongside your metoprolol 50 mg twice a day. Okay to NOT take AM dose of metoprolol on dialysis days.    Discuss these two heart medications on Monday with EP Dr. Grissom.    For your leg swelling, call our office to see what dose of lasix (furosemide) you are on, so we can adjust accordingly.    We will come back in a few weeks to see the valve doctor to discuss the next step for your valve.

## 2019-09-05 NOTE — PROGRESS NOTES
Anticoagulation Summary  As of 2019    INR goal:   2.0-3.0   TTR:   40.1 % (1.4 mo)   INR used for dosin.47! (2019)   Warfarin maintenance plan:   2.5 mg (5 mg x 0.5) every Mon, Fri; 5 mg (5 mg x 1) all other days   Weekly warfarin total:   30 mg   Plan last modified:   Catina Seymour (2019)   Next INR check:   2019   Target end date:   Indefinite    Indications    Atrial flutter (HCC) [I48.92]             Anticoagulation Episode Summary     INR check location:   Anticoagulation Clinic    Preferred lab:       Send INR reminders to:       Comments:   [facility]  lab      Anticoagulation Care Providers     Provider Role Specialty Phone number    [facility]   [phone]    Martha Light M.D.  [facility] [phone]        Anticoagulation Patient Findings          Spoke with Parmijt to report a supra therapeutic INR of 4.47.  Pt reports completed a short course of doxycycline.  INRs have been trending upward. Will HOLD dose tonight, and reduce weekly dose by 15%. Follow up in 1 weeks, to reduce the risk of adverse events related to this high risk medication, warfarin.    Catina Seymour, Clinical Pharmacist

## 2019-09-05 NOTE — PROGRESS NOTES
"Chief Complaint   Patient presents with   • Atrial Flutter     Follow up     Subjective:   Parmjit Castelan is a 78 y.o. male who presents today for hospital follow up post cardioversion for rapid aflutter.    He is a patient of Dr. Mendoza in our office.    Hx of CAD (no interventions), COPD with prior smoker, ESRD on HD, mod AS, HLD, HTN, peripheral arterial disease, and now with rEF of 30%.    He is now in SB rate in the 50;'s. He feels much better with his HR in regular rhythm.    He is still going got dialysis M,W,F.    He now has some worsening lower extremity edema. He has some new toe and hand wounds. He is having them evaluated at Urgent Care for treatment.    Past Medical History:   Diagnosis Date   • Anesthesia     \"needs more sedation\" (can sometimes hear MD during procedure)    • Anticoagulation monitoring, special range    • Aortic stenosis     mod AS- concern for low flow severe AS with rEFof 30%   • Arterial leg ulcer ([facility]) 11/8/2018   • Atrial flutter ([facility]) 6/12/2019   • Basal cell carcinoma of left cheek 3/26/2015   • BPH 7/14/2009   • Bronchitis 12/25/2018   • CAD (coronary artery disease) 2/20/2014   • CATARACT     sanjuanita surgery complete   • CHF (congestive heart failure), NYHA class II, chronic, systolic ([facility]) E F30 in setting of atrial flutter 6/13/2019   • Chronic respiratory failure with hypoxia ([facility]) 5/8/2016   • CKD (chronic kidney disease) stage 4, GFR 15-29 ml/min ([facility]) 1/15/2010    end stage renal disease   • COPD (chronic obstructive pulmonary disease) ([facility])     wears 2.5 L o2 sometimes when he sleeps   • Detached retina    • Dialysis     m,w,f [place]   • EMPHYSEMA    • Gout    • Heart burn     occas   • Heart murmur     maria esther lee cardiologist   • High cholesterol    • Hypertension    • Indigestion     occas   • Kidney cyst    • Leg pain, bilateral 8/10/2015   • On supplemental oxygen therapy    • Peripheral vascular disease ([facility]) 8/10/2015   • Pneumonia    • Primary " insomnia 3/22/2018   • Proteinuria    • PVD (peripheral vascular disease) ([facility])    • Sleep apnea     O2 PER CANULA  AT NIGHT 2l/m     Past Surgical History:   Procedure Laterality Date   • AV FISTULA CREATION Right 8/6/2019    Procedure: CREATION, AV FISTULA -  RIGHT ARM  ,  and Ligation of Left Arm Fistula;  Surgeon: Sera Peters M.D.;  Location: SURGERY [facility];  Service: General   • CATH PLACEMENT Right 8/6/2019    Procedure: INSERTION, CATHETER - PERMA ,;  Surgeon: Sera Peters M.D.;  Location: SURGERY [facility];  Service: General   • JUSTIN  6/13/2019    Procedure: ECHOCARDIOGRAM, TRANSESOPHAGEAL;  Surgeon: Harvinder Hoang M.D.;  Location: [facility];  Service: Cardiac   • CARDIOVERSION  6/13/2019    Procedure: CARDIOVERSION;  Surgeon: Harvinder Hoang M.D.;  Location: [facility];  Service: Cardiac   • AV FISTULA CREATION Right 2/21/2019    Procedure: AV FISTULA CREATION - ARM;  Surgeon: David Cason M.D.;  Location: SURGERY [facility];  Service: General   • FEMORAL ENDARTERECTOMY Left 1/25/2019    Procedure: FEMORAL ENDARTERECTOMY;  Surgeon: David Cason M.D.;  Location: [facility];  Service: General   • FEMORAL POPLITEAL BYPASS Left 1/25/2019    Procedure: LEFT FEMORAL POPLITEAL POLYTETRAFLUOROETHYLENE ePTFE(PROPATEN VASCULAR GRAFT) BYPASS;  Surgeon: David Cason M.D.;  Location: [facility];  Service: General   • ANGIOGRAM Left 1/25/2019    Procedure: LEFT LEG ANGIOGRAM;  Surgeon: David Cason M.D.;  Location: SURGERY [facility];  Service: General   • ENDOSCOPY PROCEDURE  3/21/2018    Procedure: ENDOSCOPY PROCEDURE/UPPER;  Surgeon: Enrique Child D.O.;  Location: [facility];  Service: Gastroenterology   • COLONOSCOPY - ENDO  8/15/2016    Procedure: COLONOSCOPY - ENDO;  Surgeon: Mane Whatley M.D.;  Location: ENDOSCOPY [facility];  Service:    • GASTROSCOPY WITH BIOPSY   8/13/2016    Procedure: GASTROSCOPY WITH BIOPSY;  Surgeon: Jorge Leavitt M.D.;  Location: ENDOSCOPY [facility];  Service:    • RECOVERY  12/23/2015    Procedure: VASCULAR CASE-BLANKA-LEFT ARM FISTULOGRAM WITH ANGIOPLASTY;  Surgeon: Recoveryonly Surgery;  Location: SURGERY PRE-POST PROC UNIT [facility];  Service:    • RECOVERY  3/24/2015    Performed by Recoveryonly Surgery at SURGERY PRE-POST PROC UNIT [facility]   • RECOVERY  7/29/2014    Performed by Ir-Recovery Surgery at SURGERY SAME DAY [facility]S   • RECOVERY  3/24/2014    Performed by Ir-Recovery Surgery at SURGERY [facility]   • RECOVERY  12/17/2013    Performed by Ir-Recovery Surgery at SURGERY SAME DAY [facility] ORS   • RECOVERY  7/2/2013    Performed by Ir-Recovery Surgery at SURGERY SAME DAY [facility]S   • AV FISTULA THROMBOLYSIS  7/2/2013    Performed by David Cason M.D. at SURGERY [facility]   • RECOVERY  1/29/2013    Performed by Ir[facility]Recovery Surgery at SURGERY SAME DAY [facility] ORS   • RECOVERY  7/23/2012    Performed by SURGERY, IR-RECOVERY at SURGERY SAME DAY [facility] ORS   • VITRECTOMY POSTERIOR  10/11/2011    Performed by NAHUM GE at SURGERY SAME DAY [facility] ORS   • RECOVERY  8/12/2011    Performed by SURGERY, IR-RECOVERY at SURGERY SAME DAY [facility] ORS   • VITRECTOMY POSTERIOR  1/18/2011    Performed by NAHUM GE at SURGERY SAME DAY [facility] ORS   • SCLERAL BUCKLING  1/18/2011    Performed by NAHUM GE at SURGERY SAME DAY [facility] ORS   • AV FISTULOGRAM  9/17/2010    Performed by DAVID CASON at SURGERY [facility] ORS   • ANGIOPLASTY BALLOON  9/17/2010    Performed by DAVID CASON at SURGERY [facility] ORS   • AV FISTULOGRAM  7/23/2010    Performed by DAVID CASON at SURGERY [facility] ORS   • ANGIOPLASTY BALLOON  7/23/2010    Performed by DAVID CASON at SURGERY [facility]   • AV FISTULA REVISION  2/19/2010    Performed by WILLIE HATCH at SURGERY [facility]   •  AV FISTULA CREATION  2/12/2010    Performed by ALESHIA MOSCOSO at SURGERY [facility] ORS   • CATARACT PHACO WITH IOL  4/8/08    Performed by STACEY PHILLIPS at SURGERY SAME DAY [facility] ORS   • OTHER ORTHOPEDIC SURGERY  1998    right toe for facititis     Family History   Problem Relation Age of Onset   • Stroke Mother    • Hypertension Mother    • Lung Disease Father         Emphysema, resp failure   • Genitourinary () Problems Maternal Aunt         hematuria   • Hypertension Brother      Social History     Socioeconomic History   • Marital status:      Spouse name: Not on file   • Number of children: Not on file   • Years of education: Not on file   • Highest education level: Not on file   Occupational History   • Not on file   Social Needs   • Financial resource strain: Not on file   • Food insecurity:     Worry: Not on file     Inability: Not on file   • Transportation needs:     Medical: Not on file     Non-medical: Not on file   Tobacco Use   • Smoking status: Former Smoker     Packs/day: 1.00     Years: 40.00     Pack years: 40.00     Types: Cigarettes     Last attempt to quit: 1/1/2009     Years since quitting: 10.6   • Smokeless tobacco: Never Used   • Tobacco comment: 1 pk a day for 35 yrs, QUIT JAN 1 2010   Substance and Sexual Activity   • Alcohol use: No     Alcohol/week: 0.0 oz   • Drug use: No   • Sexual activity: Never     Partners: Female     Comment: , two sons, 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   Lifestyle   • Physical activity:     Days per week: Not on file     Minutes per session: Not on file   • Stress: Not on file   Relationships   • Social connections:     Talks on phone: Not on file     Gets together: Not on file     Attends Mosque service: Not on file     Active member of club or organization: Not on file     Attends meetings of clubs or organizations: Not on file     Relationship status: Not on file   • Intimate partner violence:     Fear of current or ex partner: Not on  file     Emotionally abused: Not on file     Physically abused: Not on file     Forced sexual activity: Not on file   Other Topics Concern   • Not on file   Social History Narrative   • Not on file     No Known Allergies  Outpatient Encounter Medications as of 9/5/2019   Medication Sig Dispense Refill   • amiodarone (CORDARONE) 200 MG Tab Take 1 Tab by mouth 2 Times a Day. 60 Tab 11   • tamsulosin (FLOMAX) 0.4 MG capsule Take 1 Cap by mouth ONE-HALF HOUR AFTER BREAKFAST. 90 Cap 0   • metoprolol (LOPRESSOR) 50 MG Tab Take 1 Tab by mouth 2 Times a Day. 60 Tab 0   • zolpidem (AMBIEN) 10 MG Tab Take 1 Tab by mouth every bedtime for 90 days. 90 Each 0   • warfarin (COUMADIN) 5 MG Tab Take 1 Tab by mouth every day. 30 Tab 3   • omeprazole (PRILOSEC) 40 MG delayed-release capsule Take 40 mg by mouth every morning.     • calcium acetate (PHOS-LO) 667 MG Cap Take 2,001 mg by mouth 3 times a day, with meals. 2001mg three times a day with meals and 1334mg with snacks     • Fluticasone-Umeclidin-Vilant (TRELEGY ELLIPTA) 100-62.5-25 MCG/INH AEROSOL POWDER, BREATH ACTIVATED Inhale 1 Puff by mouth every day. Rinse mouth after use 1 Each 11   • levalbuterol (XOPENEX) 1.25 MG/3ML Nebu Soln 3 mL by Nebulization route every four hours as needed for Shortness of Breath. 120 Bullet 11   • pravastatin (PRAVACHOL) 20 MG Tab Take 20 mg by mouth every evening.     • [DISCONTINUED] amiodarone (CORDARONE) 200 MG Tab Take 2 Tabs by mouth 2 Times a Day. (Patient taking differently: Take 200 mg by mouth 2 Times a Day.) 120 Tab 0   • [DISCONTINUED] midodrine (PROAMATINE) 5 MG Tab Take 3 Tabs by mouth 3 times a day, with meals. (Patient not taking: Reported on 9/5/2019) 90 Tab 0     No facility-administered encounter medications on file as of 9/5/2019.      Review of Systems   Constitutional: Positive for malaise/fatigue. Negative for fever.   Respiratory: Negative for cough and shortness of breath.    Cardiovascular: Positive for orthopnea and  "leg swelling. Negative for chest pain, palpitations, claudication and PND.   Gastrointestinal: Negative for abdominal pain.   Musculoskeletal: Negative for myalgias.   Neurological: Negative for dizziness.        Objective:   /50 (BP Location: Left arm, Patient Position: Sitting, BP Cuff Size: Adult)   Pulse (!) 54   Ht 1.727 m (5' 8\")   Wt 73.7 kg (162 lb 7.7 oz)   SpO2 90%   BMI 24.70 kg/m²     Physical Exam   Constitutional: He appears well-developed. He appears ill.   HENT:   Head: Normocephalic and atraumatic.   Eyes: EOM are normal.   Neck: No JVD present.   Cardiovascular: Regular rhythm, normal heart sounds and intact distal pulses. Bradycardia present.   Pulmonary/Chest: Effort normal and breath sounds normal.   Musculoskeletal: Normal range of motion. He exhibits edema.   edema 2+ pitting in legs   Skin: Skin is warm and dry.   Psychiatric: He has a normal mood and affect.   Nursing note and vitals reviewed.      Assessment:     1. Atrial flutter, unspecified type (HCC)  EKG     Medical Decision Making:  Today's Assessment / Status / Plan:     1. CAD  -no angina  -consider cath in future with new reduced EF with AS evaluation  -cont statin    2. Moderate AS in the setting of EF 30%  -prior echo in '18 showing mod stenosis  -no angina, ARIAS, or lightheadedness but HF symptoms of volume overload present  -refer to valve clinic; pending MD consult in 2-3 weeks    3. Systolic HFrEF of 30% (new onset)  -remains volume overloaded, followed in HF clinic  -previously on diuretic therapy, but not on med list today?   -wife will go home and confirm diuretic therapy and plan  -cont lisinopril, metoprolol  -follow symptoms clinically  -discussed HF education at last apt, will most likely need further education at some point    4. PVD  -concern for worsening claudication  -seen by vascular surgeon, patient states there is no more opportunities for interventions  -med management with statin    5. " Aflutter  -now S/P DCCV, successful  -seeing EP next week to discuss ablation  -cont metoprolol at 50 mg BID  -cont amiodarone 200 mg BID until next apt  -cont coumadin for OAC, tolerating    6. HELGA and COPD with prior smoking  -smoking cessation  -followed by pulmonary    FU in clinic in 1 week with EP to consider ablation (2 DCCV hx), valve clinic in 2-3 weeks with JI to review mod-severe AS    Patient verbalizes understanding and agrees with the plan of care.     Collaborating MD: Tamra MAIER

## 2019-09-06 ENCOUNTER — TELEPHONE (OUTPATIENT)
Dept: CARDIOLOGY | Facility: MEDICAL CENTER | Age: 79
End: 2019-09-06

## 2019-09-06 DIAGNOSIS — I50.21 SYSTOLIC CHF, ACUTE (HCC): ICD-10-CM

## 2019-09-06 DIAGNOSIS — N18.6 ESRD (END STAGE RENAL DISEASE) ON DIALYSIS (HCC): ICD-10-CM

## 2019-09-06 DIAGNOSIS — Z99.2 ESRD (END STAGE RENAL DISEASE) ON DIALYSIS (HCC): ICD-10-CM

## 2019-09-06 NOTE — TELEPHONE ENCOUNTER
SC    Pt saw SC yesterday and was told to call today with medication information.  States he was taking furosemide before but is now taking torsemide 10 mg and wants to know if he should adjust dose. Please call pt to advise at [phone] or [phone]

## 2019-09-06 NOTE — TELEPHONE ENCOUNTER
Yes he is fluid overloaded. Re-start diuretic torsemide at 10 mg. Check bmp and bnp in 1 week with this start. Check BP daily. Call for concerns. SC

## 2019-09-06 NOTE — TELEPHONE ENCOUNTER
Pt saw NEHA MARRERO yesterday. Pt has not taken any diuretics recently but has a prescription waiting at pharmacy for torsemide 10mg daily. He wants to know if that is the right dose to start or needs different dose.     To SC

## 2019-09-07 ENCOUNTER — OFFICE VISIT (OUTPATIENT)
Dept: URGENT CARE | Facility: CLINIC | Age: 79
End: 2019-09-07
Payer: MEDICARE

## 2019-09-07 ENCOUNTER — TELEPHONE (OUTPATIENT)
Dept: CARDIOLOGY | Facility: MEDICAL CENTER | Age: 79
End: 2019-09-07

## 2019-09-07 ENCOUNTER — HOSPITAL ENCOUNTER (OUTPATIENT)
Facility: MEDICAL CENTER | Age: 79
End: 2019-09-07
Attending: PHYSICIAN ASSISTANT
Payer: MEDICARE

## 2019-09-07 VITALS
HEART RATE: 58 BPM | OXYGEN SATURATION: 95 % | WEIGHT: 161 LBS | DIASTOLIC BLOOD PRESSURE: 34 MMHG | RESPIRATION RATE: 16 BRPM | TEMPERATURE: 98.8 F | HEIGHT: 68 IN | SYSTOLIC BLOOD PRESSURE: 118 MMHG | BODY MASS INDEX: 24.4 KG/M2

## 2019-09-07 DIAGNOSIS — L03.119 CELLULITIS AND ABSCESS OF FOOT: ICD-10-CM

## 2019-09-07 DIAGNOSIS — S91.301A OPEN WOUND OF RIGHT FOOT, INITIAL ENCOUNTER: ICD-10-CM

## 2019-09-07 DIAGNOSIS — L02.619 CELLULITIS AND ABSCESS OF FOOT: ICD-10-CM

## 2019-09-07 DIAGNOSIS — Z99.2 ESRD (END STAGE RENAL DISEASE) ON DIALYSIS (HCC): ICD-10-CM

## 2019-09-07 DIAGNOSIS — I95.2 HYPOTENSION DUE TO DRUGS: ICD-10-CM

## 2019-09-07 DIAGNOSIS — N18.6 ESRD (END STAGE RENAL DISEASE) ON DIALYSIS (HCC): ICD-10-CM

## 2019-09-07 DIAGNOSIS — T14.8XXA NONHEALING NONSURGICAL WOUND: ICD-10-CM

## 2019-09-07 DIAGNOSIS — Z79.01 CHRONIC ANTICOAGULATION: Chronic | ICD-10-CM

## 2019-09-07 PROCEDURE — 99215 OFFICE O/P EST HI 40 MIN: CPT | Performed by: PHYSICIAN ASSISTANT

## 2019-09-07 PROCEDURE — 87205 SMEAR GRAM STAIN: CPT

## 2019-09-07 PROCEDURE — 87186 SC STD MICRODIL/AGAR DIL: CPT

## 2019-09-07 PROCEDURE — 87075 CULTR BACTERIA EXCEPT BLOOD: CPT

## 2019-09-07 PROCEDURE — 87070 CULTURE OTHR SPECIMN AEROBIC: CPT

## 2019-09-07 PROCEDURE — 87077 CULTURE AEROBIC IDENTIFY: CPT

## 2019-09-07 RX ORDER — SULFAMETHOXAZOLE AND TRIMETHOPRIM 400; 80 MG/1; MG/1
1 TABLET ORAL DAILY
Qty: 6 TAB | Refills: 0 | Status: SHIPPED | OUTPATIENT
Start: 2019-09-07 | End: 2019-09-12

## 2019-09-07 RX ORDER — SULFAMETHOXAZOLE AND TRIMETHOPRIM 800; 160 MG/1; MG/1
1 TABLET ORAL ONCE
Qty: 1 TAB | Refills: 0 | Status: SHIPPED | OUTPATIENT
Start: 2019-09-07 | End: 2019-09-07

## 2019-09-07 ASSESSMENT — ENCOUNTER SYMPTOMS: SHORTNESS OF BREATH: 1

## 2019-09-07 NOTE — TELEPHONE ENCOUNTER
Pt notified of recommendations and states understanding.    BNP and BMP ordered. [facility] if concerns. He was encouraged to track his BP and weights daily.

## 2019-09-07 NOTE — TELEPHONE ENCOUNTER
Patient was seen in urgent care for a nonhealing wound to his hand.  He was started on Bactrim.  The patient is curious as to whether or not this could cause a low diastolic blood pressure or interfere with any of his cardiac medications.  His blood pressure was 118/34 in urgent care.  He is not dizzy or lightheaded.  He is a little weak but also started a new medication called tramadol.  I have recommended he check with pharmacy as to interactions with Bactrim his new antibiotic.    The patient's wife is asking whether or not he should take his mid a drain if his blood pressure is that low.  I have recommended he hydrate and recheck his blood pressure later today and not take midodrine especially if he is not symptomatic.

## 2019-09-07 NOTE — PROGRESS NOTES
"Subjective:   Parmjit Castelan is a 79 y.o. male who presents for Wound Infection (RIGHT hand, since recent inpatient stay in hospital, discharge)    Mr. Castelan presents to urgent care with open wound to the dorsum of the right hand as well as to the right foot present for quite some time.  Patient was seen in urgent care on 8/27/19 with contusion to the right dorsum of the hand with a wound infection.  He was placed on doxycycline and mupirocin.  Patient reports no real improvement with this treatment regimen.  Over the past 1 week he has had a new wound on the right foot great toe plantar aspect with redness and swelling of the right foot.    Patient has multiple chronic medical conditions including end-stage renal disease on dialysis, recent hospitalization for cardiac tamponade not, chronic atrial fibrillation and chronic anticoagulation.        Wound Infection   Pertinent negatives include no chest pain.     Review of Systems   Respiratory: Positive for shortness of breath.    Cardiovascular: Positive for leg swelling. Negative for chest pain.   Skin:        Open wound right hand and right foot   All other systems reviewed and are negative.    No Known Allergies     Objective:   BP (!) 118/34 (BP Location: Left arm, Patient Position: Sitting, BP Cuff Size: Adult) Comment: Taken twice, cannot take on R side (fistula)  Pulse (!) 58   Temp 37.1 °C (98.8 °F) (Temporal)   Resp 16   Ht 1.727 m (5' 8\")   Wt 73 kg (161 lb)   SpO2 95%   BMI 24.48 kg/m²   Physical Exam   Constitutional: He is oriented to person, place, and time. He appears well-developed and well-nourished. He appears ill. No distress.   HENT:   Head: Normocephalic and atraumatic.   Right Ear: External ear normal.   Left Ear: External ear normal.   Nose: Nose normal.   Mouth/Throat: Oropharynx is clear and moist.   Eyes: Pupils are equal, round, and reactive to light. Conjunctivae and EOM are normal.   Neck: Normal range of motion. Neck supple. "   Cardiovascular: Regular rhythm and normal heart sounds. Bradycardia present.   Pulses:       Dorsalis pedis pulses are 0 on the left side.        Posterior tibial pulses are 1+ on the left side.   Very difficult to palpate dorsalis pedis and posterior tibial pulses on the right foot with sluggish capillary refill however foot is not cold   Pulmonary/Chest: Effort normal and breath sounds normal. No respiratory distress.   Abdominal: There is no tenderness.   Musculoskeletal: Normal range of motion.   Neurological: He is alert and oriented to person, place, and time.   Skin: Skin is warm and dry. No rash noted.        Dorsum of right hand with 3 cm open wound with surrounding erythema and warmth with purulent drainage  Right foot great toe plantar aspect with 2 cm open wound with surrounding erythema and warmth with the entire right foot being somewhat swollen and warm to the touch   Psychiatric: He has a normal mood and affect. Judgment normal.           Assessment/Plan:   Assessment    1. Nonhealing nonsurgical wound  - sulfamethoxazole-trimethoprim (BACTRIM DS) 800-160 MG tablet; Take 1 Tab by mouth Once for 1 dose.  Dispense: 1 Tab; Refill: 0  - sulfamethoxazole-trimethoprim (BACTRIM) 400-80 MG Tab; Take 1 Tab by mouth every day for 6 days.  Dispense: 6 Tab; Refill: 0  - UC AMA/Refusal of Treatment  - REFERRAL TO WOUND CLINIC  - ANAEROBIC/AEROBIC/GRAM STAIN    2. Open wound of right foot, initial encounter  - sulfamethoxazole-trimethoprim (BACTRIM DS) 800-160 MG tablet; Take 1 Tab by mouth Once for 1 dose.  Dispense: 1 Tab; Refill: 0  - sulfamethoxazole-trimethoprim (BACTRIM) 400-80 MG Tab; Take 1 Tab by mouth every day for 6 days.  Dispense: 6 Tab; Refill: 0  - UC AMA/Refusal of Treatment  - REFERRAL TO WOUND CLINIC  - ANAEROBIC/AEROBIC/GRAM STAIN    3. Cellulitis and abscess of foot  - sulfamethoxazole-trimethoprim (BACTRIM DS) 800-160 MG tablet; Take 1 Tab by mouth Once for 1 dose.  Dispense: 1 Tab; Refill:  0  - sulfamethoxazole-trimethoprim (BACTRIM) 400-80 MG Tab; Take 1 Tab by mouth every day for 6 days.  Dispense: 6 Tab; Refill: 0  - UC AMA/Refusal of Treatment  - REFERRAL TO WOUND CLINIC  - ANAEROBIC/AEROBIC/GRAM STAIN    4. Chronic anticoagulation  - UC AMA/Refusal of Treatment  - REFERRAL TO WOUND CLINIC  - ANAEROBIC/AEROBIC/GRAM STAIN    5. ESRD (end stage renal disease) on dialysis (HCC)  - UC AMA/Refusal of Treatment  - REFERRAL TO WOUND CLINIC  - ANAEROBIC/AEROBIC/GRAM STAIN    6. Hypotension due to drugs    Given the patient's multiple comorbidities, appearance of the wounds, failure of outpatient therapy I recommended the patient present to the emergency department for further evaluation and management.  Patient refuses and agrees to sign AGAINST MEDICAL ADVICE.  Case was discussed with Dr. Apple who recommends antibiotic renally dosed for dialysis and referral to wound clinic.  Wound culture is obtained and dressing applied.  Patient will be treated with Bactrim DS 1 tab today followed by regular strength Bactrim once daily for 7 days and to be taken after dialysis on the days of dialysis.  I did  the patient on the risk associated with this medication and his Coumadin and have encouraged him to reach out to cardiology to discuss the need for closer monitoring of INRs.      Differential diagnosis, natural history, supportive care, and indications for immediate follow-up discussed.    Red flag warning symptoms and strict ER/follow-up precautions given.    Please note that this note was created using voice recognition speech to text software. Every effort has been made to correct obvious errors.  However, I expect there are errors of grammar and possibly context that were not discovered prior to finalizing the note  NEHA Peterson PA-C    Addendum: Received phone call from pharmacy stating that the patient states he does not believe he will take the antibiotic due to the interaction with Coumadin.  This  was discussed in detail with the patient at the time of visit.  I did want the patient to go to the emergency room and he refused and did sign AGAINST MEDICAL ADVICE.  I do not have anything further to offer at this time.  NEHA Peterson PA-C      Addendum: 9/11/19:  Contacted patient and wife with wound culture result positive for enterococcus sensitive to penicillin based medications.  Recommend changing to amoxicillin.  Patient desires to get off of the Bactrim due to issues with Coumadin.  They are encouraged to keep appointment with wound clinic as scheduled for tomorrow.  Prescription for amoxicillin 500 mg once a day and to be taken after dialysis on dialysis days is sent to pharmacy on record.  NEHA Peterson PA-C

## 2019-09-08 LAB
GRAM STN SPEC: NORMAL
SIGNIFICANT IND 70042: NORMAL
SITE SITE: NORMAL
SOURCE SOURCE: NORMAL

## 2019-09-09 ENCOUNTER — OFFICE VISIT (OUTPATIENT)
Dept: CARDIOLOGY | Facility: MEDICAL CENTER | Age: 79
End: 2019-09-09
Payer: MEDICARE

## 2019-09-09 ENCOUNTER — ANTICOAGULATION MONITORING (OUTPATIENT)
Dept: MEDICAL GROUP | Facility: MEDICAL CENTER | Age: 79
End: 2019-09-09

## 2019-09-09 VITALS
OXYGEN SATURATION: 93 % | HEIGHT: 68 IN | SYSTOLIC BLOOD PRESSURE: 128 MMHG | WEIGHT: 164 LBS | HEART RATE: 60 BPM | DIASTOLIC BLOOD PRESSURE: 62 MMHG | BODY MASS INDEX: 24.86 KG/M2

## 2019-09-09 DIAGNOSIS — I48.92 ATRIAL FLUTTER, UNSPECIFIED TYPE (HCC): ICD-10-CM

## 2019-09-09 DIAGNOSIS — I50.22 CHRONIC SYSTOLIC CONGESTIVE HEART FAILURE (HCC): ICD-10-CM

## 2019-09-09 DIAGNOSIS — Z79.01 CHRONIC ANTICOAGULATION: ICD-10-CM

## 2019-09-09 DIAGNOSIS — N18.6 ESRD (END STAGE RENAL DISEASE) (HCC): ICD-10-CM

## 2019-09-09 DIAGNOSIS — I48.3 TYPICAL ATRIAL FLUTTER (HCC): ICD-10-CM

## 2019-09-09 LAB — EKG IMPRESSION: NORMAL

## 2019-09-09 PROCEDURE — 93000 ELECTROCARDIOGRAM COMPLETE: CPT | Performed by: INTERNAL MEDICINE

## 2019-09-09 PROCEDURE — 99215 OFFICE O/P EST HI 40 MIN: CPT | Performed by: INTERNAL MEDICINE

## 2019-09-09 RX ORDER — TORSEMIDE 10 MG/1
10 TABLET ORAL DAILY
COMMUNITY
Start: 2019-09-05 | End: 2019-10-08

## 2019-09-09 NOTE — PROGRESS NOTES
"Arrhythmia Clinic Note (Established patient)    DOS: 9/9/2019    Chief complaint/Reason for consult: typical atrial flutter    Interval History:  Patient is a 78 yo M. History of ESRD on HD. History of depressed LV function and systolic CHF though most recent LV function was 55-65%. HE has had a history of poorly controlled typical atrial flutter. Scheduled for CTI ablation in July but was cancelled as he was hospitalized for severe electrolyte derangements. Underwent cardioversion and subsequently has been on amiodarone 200 BID. Comes in today in sinus rhythm. Still wanting to proceed with CTI ablation.    ROS (+ bold):  Constitutional: Fevers/chills/fatigue/weightloss  HEENT: Blurry vision/eye pain/sore throat/hearing loss  Respiratory: Shortness of breath/cough  Cardiovascular: Chest pain/palpitations/edema/orthopnea/syncope  GI: Nausea/vomitting/diarrhea  MSK: Arthralgias/myagias/muscle weakness  Skin: Rash/sores  Neurological: Numbness/tremors/vertigo  Endocrine: Excessive thirst/polyuria/cold intolerance/heat intolerance  Psych: Depression/anxiety    Past Medical History:   Diagnosis Date   • Anesthesia     \"needs more sedation\" (can sometimes hear MD during procedure)    • Anticoagulation monitoring, special range    • Aortic stenosis     mod AS- concern for low flow severe AS with rEFof 30%   • Arterial leg ulcer ([facility]) 11/8/2018   • Atrial flutter ([facility]) 6/12/2019   • Basal cell carcinoma of left cheek 3/26/2015   • BPH 7/14/2009   • Bronchitis 12/25/2018   • CAD (coronary artery disease) 2/20/2014   • CATARACT     sanjuanita surgery complete   • CHF (congestive heart failure), NYHA class II, chronic, systolic ([facility]) E F30 in setting of atrial flutter 6/13/2019   • Chronic respiratory failure with hypoxia ([facility]) 5/8/2016   • CKD (chronic kidney disease) stage 4, GFR 15-29 ml/min ([facility]) 1/15/2010    end stage renal disease   • COPD (chronic obstructive pulmonary disease) ([facility])     wears 2.5 L o2 sometimes when he sleeps "   • Detached retina    • Dialysis     m,w,f [facility]   • EMPHYSEMA    • Gout    • Heart burn     occas   • Heart murmur     maria esther lee cardiologist   • High cholesterol    • Hypertension    • Indigestion     occas   • Kidney cyst    • Leg pain, bilateral 8/10/2015   • On supplemental oxygen therapy    • Peripheral vascular disease (HCC) 8/10/2015   • Pneumonia    • Primary insomnia 3/22/2018   • Proteinuria    • PVD (peripheral vascular disease) (HCC)    • Sleep apnea     O2 PER CANULA  AT NIGHT 2l/m       Past Surgical History:   Procedure Laterality Date   • AV FISTULA CREATION Right 8/6/2019    Procedure: CREATION, AV FISTULA -  RIGHT ARM  ,  and Ligation of Left Arm Fistula;  Surgeon: Sera Peters M.D.;  Location: [facility];  Service: General   • CATH PLACEMENT Right 8/6/2019    Procedure: INSERTION, CATHETER - PERMA ,;  Surgeon: Sera Peters M.D.;  Location: [facility];  Service: General   • JUSTIN  6/13/2019    Procedure: ECHOCARDIOGRAM, TRANSESOPHAGEAL;  Surgeon: Harvinder Hoang M.D.;  Location: [facility];  Service: Cardiac   • CARDIOVERSION  6/13/2019    Procedure: CARDIOVERSION;  Surgeon: Harvinder Hoang M.D.;  Location: [facility];  Service: Cardiac   • AV FISTULA CREATION Right 2/21/2019    Procedure: AV FISTULA CREATION - ARM;  Surgeon: David Cason M.D.;  Location: [facility];  Service: General   • FEMORAL ENDARTERECTOMY Left 1/25/2019    Procedure: FEMORAL ENDARTERECTOMY;  Surgeon: David Cason M.D.;  Location: [facility];  Service: General   • FEMORAL POPLITEAL BYPASS Left 1/25/2019    Procedure: LEFT FEMORAL POPLITEAL POLYTETRAFLUOROETHYLENE ePTFE(PROPATEN VASCULAR GRAFT) BYPASS;  Surgeon: David Cason M.D.;  Location: [facility];  Service: General   • ANGIOGRAM Left 1/25/2019    Procedure: LEFT LEG ANGIOGRAM;  Surgeon: David Cason M.D.;  Location:  SURGERY [facility];  Service: General   • ENDOSCOPY PROCEDURE  3/21/2018    Procedure: ENDOSCOPY PROCEDURE/UPPER;  Surgeon: Enrique Child D.O.;  Location: [facility];  Service: Gastroenterology   • COLONOSCOPY - ENDO  8/15/2016    Procedure: COLONOSCOPY - ENDO;  Surgeon: Mane Whatley M.D.;  Location: ENDOSCOPY [facility];  Service:    • GASTROSCOPY WITH BIOPSY  8/13/2016    Procedure: GASTROSCOPY WITH BIOPSY;  Surgeon: Jorge Leavitt M.D.;  Location: ENDOSCOPY [facility];  Service:    • RECOVERY  12/23/2015    Procedure: VASCULAR CASE-CASON-LEFT ARM FISTULOGRAM WITH ANGIOPLASTY;  Surgeon: Recoveryonly Surgery;  Location: SURGERY PRE-POST PROC UNIT [facility];  Service:    • RECOVERY  3/24/2015    Performed by [facility] Surgery at SURGERY PRE-POST PROC UNIT [facility]   • RECOVERY  7/29/2014    Performed by Ir-Recovery Surgery at SURGERY SAME DAY [facility]   • RECOVERY  3/24/2014    Performed by [facility] Surgery at SURGERY [facility]   • RECOVERY  12/17/2013    Performed by [facility]Recovery Surgery at SURGERY SAME DAY [facility]   • RECOVERY  7/2/2013    Performed by Ir[facility]Recovery Surgery at SURGERY SAME DAY [facility] ORS   • AV FISTULA THROMBOLYSIS  7/2/2013    Performed by David Cason M.D. at SURGERY [facility]   • RECOVERY  1/29/2013    Performed by Ir-Recovery Surgery at SURGERY SAME DAY [facility] ORS   • RECOVERY  7/23/2012    Performed by SURGERY, IR-RECOVERY at SURGERY SAME DAY [facility] ORS   • VITRECTOMY POSTERIOR  10/11/2011    Performed by NAHUM GE at SURGERY SAME DAY [facility] ORS   • RECOVERY  8/12/2011    Performed by SURGERY, IR-RECOVERY at SURGERY SAME DAY [facility] ORS   • VITRECTOMY POSTERIOR  1/18/2011    Performed by NAHUM GE at SURGERY SAME DAY [facility]S   • SCLERAL BUCKLING  1/18/2011    Performed by NAHUM GE at SURGERY SAME DAY [facility] ORS   • AV FISTULOGRAM  9/17/2010    Performed by DAVID CASON at [facility]  ORS   • ANGIOPLASTY BALLOON  9/17/2010    Performed by ALESHIA MOSCOSO at SURGERY [facility] ORS   • AV FISTULOGRAM  7/23/2010    Performed by ALESHIA MOSCOSO at SURGERY [facility] ORS   • ANGIOPLASTY BALLOON  7/23/2010    Performed by ALESHIA MOSCOSO at SURGERY [facility] ORS   • AV FISTULA REVISION  2/19/2010    Performed by WILLIE HATCH at SURGERY [facility] ORS   • AV FISTULA CREATION  2/12/2010    Performed by ALESHIA MOSCOSO at SURGERY [facility] ORS   • CATARACT PHACO WITH IOL  4/8/08    Performed by STACEY PHILLIPS at SURGERY SAME DAY [facility] ORS   • OTHER ORTHOPEDIC SURGERY  1998    right toe for facititis       Social History     Socioeconomic History   • Marital status:      Spouse name: Not on file   • Number of children: Not on file   • Years of education: Not on file   • Highest education level: Not on file   Occupational History   • Not on file   Social Needs   • Financial resource strain: Not on file   • Food insecurity:     Worry: Not on file     Inability: Not on file   • Transportation needs:     Medical: Not on file     Non-medical: Not on file   Tobacco Use   • Smoking status: Former Smoker     Packs/day: 1.00     Years: 40.00     Pack years: 40.00     Types: Cigarettes     Last attempt to quit: 1/1/2009     Years since quitting: 10.6   • Smokeless tobacco: Never Used   • Tobacco comment: 1 pk a day for 35 yrs, QUIT JAN 1 2010   Substance and Sexual Activity   • Alcohol use: No     Alcohol/week: 0.0 oz   • Drug use: No   • Sexual activity: Never     Partners: Female     Comment: , two sons, 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   Lifestyle   • Physical activity:     Days per week: Not on file     Minutes per session: Not on file   • Stress: Not on file   Relationships   • Social connections:     Talks on phone: Not on file     Gets together: Not on file     Attends [employer] service: Not on file     Active member of club or organization: Not on file     Attends  meetings of clubs or organizations: Not on file     Relationship status: Not on file   • Intimate partner violence:     Fear of current or ex partner: Not on file     Emotionally abused: Not on file     Physically abused: Not on file     Forced sexual activity: Not on file   Other Topics Concern   • Not on file   Social History Narrative   • Not on file       Family History   Problem Relation Age of Onset   • Stroke Mother    • Hypertension Mother    • Lung Disease Father         Emphysema, resp failure   • Genitourinary () Problems Maternal Aunt         hematuria   • Hypertension Brother        No Known Allergies    Current Outpatient Medications   Medication Sig Dispense Refill   • amiodarone (CORDARONE) 200 MG Tab Take 1 Tab by mouth 2 Times a Day. 60 Tab 11   • tamsulosin (FLOMAX) 0.4 MG capsule Take 1 Cap by mouth ONE-HALF HOUR AFTER BREAKFAST. 90 Cap 0   • metoprolol (LOPRESSOR) 50 MG Tab Take 1 Tab by mouth 2 Times a Day. 60 Tab 0   • zolpidem (AMBIEN) 10 MG Tab Take 1 Tab by mouth every bedtime for 90 days. 90 Each 0   • warfarin (COUMADIN) 5 MG Tab Take 1 Tab by mouth every day. 30 Tab 3   • omeprazole (PRILOSEC) 40 MG delayed-release capsule Take 40 mg by mouth every morning.     • calcium acetate (PHOS-LO) 667 MG Cap Take 2,001 mg by mouth 3 times a day, with meals. 2001mg three times a day with meals and 1334mg with snacks     • Fluticasone-Umeclidin-Vilant (TRELEGY ELLIPTA) 100-62.5-25 MCG/INH AEROSOL POWDER, BREATH ACTIVATED Inhale 1 Puff by mouth every day. Rinse mouth after use 1 Each 11   • pravastatin (PRAVACHOL) 20 MG Tab Take 20 mg by mouth every evening.     • torsemide (DEMADEX) 10 MG tablet      • sulfamethoxazole-trimethoprim (BACTRIM) 400-80 MG Tab Take 1 Tab by mouth every day for 6 days. (Patient not taking: Reported on 9/9/2019) 6 Tab 0   • levalbuterol (XOPENEX) 1.25 MG/3ML Nebu Soln 3 mL by Nebulization route every four hours as needed for Shortness of Breath. (Patient not taking:  "Reported on 9/7/2019) 120 Bullet 11     No current facility-administered medications for this visit.        Physical Exam:  Vitals:    09/09/19 1423   BP: 128/62   BP Location: Left arm   Patient Position: Sitting   BP Cuff Size: Adult   Pulse: 60   SpO2: 93%   Weight: 74.4 kg (164 lb)   Height: 1.727 m (5' 8\")     General appearance: NAD, conversant   Eyes: anicteric sclerae, moist conjunctivae; no lid-lag; PERRLA  HENT: Atraumatic; oropharynx clear with moist mucous membranes and no mucosal ulcerations; normal hard and soft palate  Neck: Trachea midline; FROM, supple, no thyromegaly or lymphadenopathy  Lungs: CTA, with normal respiratory effort and no intercostal retractions  CV: RRR, no MRGs, no JVD, R chest dialysis catheter  Abdomen: Soft, non-tender; no masses or HSM  Extremities: 1+ edema RLE. +Maturing RUE fistula.   Skin: Normal temperature, turgor and texture; no rash, ulcers or subcutaneous nodules  Psych: Appropriate affect, alert and oriented to person, place and time    Data:  Labs reviewed    Prior echo/stress reviewed    EKG interpreted by me:  Sinus, RBBB    Impression/Plan:  1. Typical atrial flutter (HCC)     2. Chronic anticoagulation     3. Chronic systolic congestive heart failure (HCC)     4. ESRD (end stage renal disease) (HCC)       -Risks/benefits/alternatives discussed  -All questions were answered  -Will proceed with AFL ablation  -Continue perioperative OAC  -Reduce amiodarone to 200 qdronnie Grissom MD    "

## 2019-09-10 LAB
BACTERIA WND AEROBE CULT: ABNORMAL
BACTERIA WND AEROBE CULT: ABNORMAL
GRAM STN SPEC: ABNORMAL
SIGNIFICANT IND 70042: ABNORMAL
SITE SITE: ABNORMAL
SOURCE SOURCE: ABNORMAL

## 2019-09-10 NOTE — PROGRESS NOTES
Spoke with pts wife on the phone, pt has not picked up Bactrim d/t concern of interaction with warfarin. Instructed ok to take warfarin and Bactrim but will need to lower warfarin dose d/t interaction. Pt will lower dose to 2.5 mg daily while on bactrim, next INR in 2 daysl     Annabel Lynn, DanaeD

## 2019-09-11 ENCOUNTER — TELEPHONE (OUTPATIENT)
Dept: URGENT CARE | Facility: CLINIC | Age: 79
End: 2019-09-11

## 2019-09-11 ENCOUNTER — HOSPITAL ENCOUNTER (OUTPATIENT)
Dept: LAB | Facility: MEDICAL CENTER | Age: 79
End: 2019-09-11
Attending: NURSE PRACTITIONER
Payer: MEDICARE

## 2019-09-11 DIAGNOSIS — I48.92 ATRIAL FLUTTER, UNSPECIFIED TYPE (HCC): ICD-10-CM

## 2019-09-11 LAB
BACTERIA SPEC ANAEROBE CULT: NORMAL
INR PPP: 2.79 (ref 0.87–1.13)
PROTHROMBIN TIME: 30.5 SEC (ref 12–14.6)
SIGNIFICANT IND 70042: NORMAL
SITE SITE: NORMAL
SOURCE SOURCE: NORMAL

## 2019-09-11 PROCEDURE — 85610 PROTHROMBIN TIME: CPT

## 2019-09-11 PROCEDURE — 36415 COLL VENOUS BLD VENIPUNCTURE: CPT

## 2019-09-11 RX ORDER — AMOXICILLIN 500 MG/1
500 CAPSULE ORAL DAILY
Qty: 7 CAP | Refills: 0 | Status: ON HOLD | OUTPATIENT
Start: 2019-09-11 | End: 2019-09-15

## 2019-09-11 NOTE — TELEPHONE ENCOUNTER
Hi, patient was seen by you on 9/7/19 at [facility].   Please review results for:   Component      Latest Ref Rng & Units 9/7/2019           5:17 PM   Significant Indicator       POS (POS)   Source       WND   Site       Right hand   Culture Result       Moderate growth mixed skin em. (A)     Component      Latest Ref Rng & Units 9/7/2019           5:17 PM   Significant Indicator          Source          Site          Culture Result       Enterococcus faecalis (A) . . .

## 2019-09-12 ENCOUNTER — NON-PROVIDER VISIT (OUTPATIENT)
Dept: WOUND CARE | Facility: MEDICAL CENTER | Age: 79
End: 2019-09-12
Attending: PHYSICIAN ASSISTANT
Payer: MEDICARE

## 2019-09-12 ENCOUNTER — TELEPHONE (OUTPATIENT)
Dept: CARDIOLOGY | Facility: MEDICAL CENTER | Age: 79
End: 2019-09-12

## 2019-09-12 ENCOUNTER — ANTICOAGULATION MONITORING (OUTPATIENT)
Dept: VASCULAR LAB | Facility: MEDICAL CENTER | Age: 79
End: 2019-09-12

## 2019-09-12 DIAGNOSIS — I48.3 TYPICAL ATRIAL FLUTTER (HCC): ICD-10-CM

## 2019-09-12 PROCEDURE — 99211 OFF/OP EST MAY X REQ PHY/QHP: CPT

## 2019-09-12 PROCEDURE — 97602 WOUND(S) CARE NON-SELECTIVE: CPT

## 2019-09-12 NOTE — TELEPHONE ENCOUNTER
Patient scheduled for flutter ablation on 10-23-19 at [facility] with Dr. Grissom.

## 2019-09-12 NOTE — PROGRESS NOTES
Anticoagulation Summary  As of 2019    INR goal:   2.0-3.0   TTR:   36.1 % (1.6 mo)   INR used for dosin.79 (2019)   Warfarin maintenance plan:   2.5 mg (5 mg x 0.5) every Mon, Fri; 5 mg (5 mg x 1) all other days   Weekly warfarin total:   30 mg   Plan last modified:   Catina M Filter (2019)   Next INR check:      Target end date:   Indefinite    Indications    Atrial flutter (HCC) [I48.92]             Anticoagulation Episode Summary     INR check location:   Anticoagulation Clinic    Preferred lab:       Send INR reminders to:       Comments:   [facility] lab      Anticoagulation Care Providers     Provider Role Specialty Phone number    [facility]   [phone]    Martha Light M.D.  Family Medicine [phone]        Anticoagulation Patient Findings    Called patient and left message to call us back. Need to see if he is still taking bactrim.     Jose Glass, Pharmacy Intern

## 2019-09-12 NOTE — PROGRESS NOTES
Anticoagulation Summary  As of 2019    INR goal:   2.0-3.0   TTR:   36.1 % (1.6 mo)   INR used for dosin.79 (2019)   Warfarin maintenance plan:   2.5 mg (5 mg x 0.5) every Mon, Fri; 5 mg (5 mg x 1) all other days   Weekly warfarin total:   30 mg   Plan last modified:   Catina Seymour (2019)   Next INR check:   2019   Target end date:   Indefinite    Indications    Atrial flutter (HCC) [I48.92]             Anticoagulation Episode Summary     INR check location:   Anticoagulation Clinic    Preferred lab:       Send INR reminders to:       Comments:   [facility] lab      Anticoagulation Care Providers     Provider Role Specialty Phone number    [facility]   [phone]    Martha Light M.D.  Family Medicine [phone]        Anticoagulation Patient Findings      Spoke with patient.  INR is therapeutic.   Pt is no longer on Septra but is now on amoxicillin.  Pt denies any unusual s/s of bleeding, bruising, clotting or any changes to diet or medications. Denies any etoh, cranberries, supplements, or illness.     Will have pt continue with his regular warfarin dosing of 5mg daily except 2.5mg on  and .     Repeat INR in 1 week(s).     Ashley Cabrera, PharmD

## 2019-09-12 NOTE — CERTIFICATION
"Non Provider Encounter- Full Thickness wound    HISTORY OF PRESENT ILLNESS        START OF CARE IN CLINIC: 0912/2019     REFERRING PROVIDER: Jeanine Peterson PA-C     WOUND- Full thickness wound   LOCATION: R dorsal hand, necrotic dry wound of R distal hallux   WOUND HISTORY: Pt reports scraping his hand on a drawer about a week ago and getting a skin tear. He has been putting abx ointment and band aid on it, says it isn't healing. He denies having any kind of lesion at the site prior to scraping it. Pt says he has had a painful blister on R hallux for \"several weeks\" that \"dried up but hurts like hell\". He has been referred to Dr Peters (vascular) and has undergone an arterial duplex at her office, but has not gone in for f/u yet.     PERTINENT PMH: PAD, hx of basal cell ca      IMAGING:none   VASCULAR STUDIES: August 06 2019, and recently at Dr. Peters's office.      LAST  WOUND CULTURE:  DATE : na                    DIABETES: no  MOST RECENT A1C:      TOBACCO USE: hx 1 PPD x 35yr, quit in 2010    RESULTS:   FINDINGS   Right.    Stenosis of the common, profunda and superficial femoral  arteries .    Velocities are consistent with 50-75% stenosis.    atherosclerosis of the right popliteal artery with low amplitude monophasic    form.   Tibial arteries showed atherosclerosis with very low amplitude monophasic    forms.   Right peroneal artery seems occluded distally.     Left.    Left CFA not visualized due to central line.     Abdelrahman VIEYRA To   (Electronically Signed)   Final Date:      06 August 2019                     14:51      CLINIC XAVI: see vascular studies. Extremities cool, pulses not palpable. Monophasic with doppler    FALL RISK ASSESSMENT:pt denies any falls or being a fall risk. Written and verbal fall risk instr given.   X  65 years or older         Fall within the last 2 years       Uses ambulatory devices      Loss of protective sensation in feet       Use of prostethic/orthotic    Presence of " "lower extremity/foot/toe amputation   X Taking medication that increases risk (per facility policy)    Interventions Recommended (if any of the above are selected):   Use of Assistive Device:   Supervision with ambulation: Caregiver   Assistance with ambulation: Caregiver   Home safety education: Educational material provided      PAST MEDICAL HISTORY:   Past Medical History:   Diagnosis Date   • Anesthesia     \"needs more sedation\" (can sometimes hear MD during procedure)    • Anticoagulation monitoring, special range    • Aortic stenosis     mod AS- concern for low flow severe AS with rEFof 30%   • Arterial leg ulcer ([facility]) 11/8/2018   • Atrial flutter ([facility]) 6/12/2019   • Basal cell carcinoma of left cheek 3/26/2015   • BPH 7/14/2009   • Bronchitis 12/25/2018   • CAD (coronary artery disease) 2/20/2014   • CATARACT     sanjuanita surgery complete   • CHF (congestive heart failure), NYHA class II, chronic, systolic ([facility]) E F30 in setting of atrial flutter 6/13/2019   • Chronic respiratory failure with hypoxia ([facility]) 5/8/2016   • CKD (chronic kidney disease) stage 4, GFR 15-29 ml/min ([facility]) 1/15/2010    end stage renal disease   • COPD (chronic obstructive pulmonary disease) ([facility])     wears 2.5 L o2 sometimes when he sleeps   • Detached retina    • Dialysis     m,w,f juliann/[facility]   • EMPHYSEMA    • Gout    • Heart burn     occas   • Heart murmur     maria esther lee cardiologist   • High cholesterol    • Hypertension    • Indigestion     occas   • Kidney cyst    • Leg pain, bilateral 8/10/2015   • On supplemental oxygen therapy    • Peripheral vascular disease ([facility]) 8/10/2015   • Pneumonia    • Primary insomnia 3/22/2018   • Proteinuria    • PVD (peripheral vascular disease) ([facility])    • Sleep apnea     O2 PER CANULA  AT NIGHT 2l/m       PAST SURGICAL HISTORY:   Past Surgical History:   Procedure Laterality Date   • AV FISTULA CREATION Right 8/6/2019    Procedure: CREATION, AV FISTULA -  RIGHT ARM  ,  and Ligation of Left " Arm Fistula;  Surgeon: Sera Peters M.D.;  Location: SURGERY [facility];  Service: General   • CATH PLACEMENT Right 8/6/2019    Procedure: INSERTION, CATHETER - PERMA ,;  Surgeon: Sera Peters M.D.;  Location: SURGERY [facility];  Service: General   • JUSTIN  6/13/2019    Procedure: ECHOCARDIOGRAM, TRANSESOPHAGEAL;  Surgeon: Harvinder Hoang M.D.;  Location: SURGERY [facility];  Service: Cardiac   • CARDIOVERSION  6/13/2019    Procedure: CARDIOVERSION;  Surgeon: Harvinder Hoang M.D.;  Location: [facility];  Service: Cardiac   • AV FISTULA CREATION Right 2/21/2019    Procedure: AV FISTULA CREATION - ARM;  Surgeon: David Cason M.D.;  Location: [facility];  Service: General   • FEMORAL ENDARTERECTOMY Left 1/25/2019    Procedure: FEMORAL ENDARTERECTOMY;  Surgeon: David Cason M.D.;  Location: [facility];  Service: General   • FEMORAL POPLITEAL BYPASS Left 1/25/2019    Procedure: LEFT FEMORAL POPLITEAL POLYTETRAFLUOROETHYLENE ePTFE(PROPATEN VASCULAR GRAFT) BYPASS;  Surgeon: David Cason M.D.;  Location: [facility];  Service: General   • ANGIOGRAM Left 1/25/2019    Procedure: LEFT LEG ANGIOGRAM;  Surgeon: David Cason M.D.;  Location: [facility];  Service: General   • ENDOSCOPY PROCEDURE  3/21/2018    Procedure: ENDOSCOPY PROCEDURE/UPPER;  Surgeon: Enrique Child D.O.;  Location: [facility];  Service: Gastroenterology   • COLONOSCOPY - ENDO  8/15/2016    Procedure: COLONOSCOPY - ENDO;  Surgeon: Mane Whatley M.D.;  Location: ENDOSCOPY [facility];  Service:    • GASTROSCOPY WITH BIOPSY  8/13/2016    Procedure: GASTROSCOPY WITH BIOPSY;  Surgeon: Jorge Leavitt M.D.;  Location: ENDOSCOPY [facility];  Service:    • RECOVERY  12/23/2015    Procedure: VASCULAR CASE-[place]-LEFT ARM FISTULOGRAM WITH ANGIOPLASTY;  Surgeon: [facility];  Location: SURGERY PRE-POST PROC UNIT  [facility];  Service:    • RECOVERY  3/24/2015    Performed by [facility] at SURGERY PRE-POST PROC UNIT [facility]   • RECOVERY  7/29/2014    Performed by [facility] at SURGERY SAME DAY [facility]   • RECOVERY  3/24/2014    Performed by [facility] at SURGERY [facility]   • RECOVERY  12/17/2013    Performed by [facility] at SURGERY SAME DAY [facility]   • RECOVERY  7/2/2013    Performed by [facility] at SURGERY SAME DAY [facility]   • AV FISTULA THROMBOLYSIS  7/2/2013    Performed by David Cason M.D. at SURGERY [facility]   • RECOVERY  1/29/2013    Performed by [facility] at SURGERY SAME DAY [facility]   • RECOVERY  7/23/2012    Performed by SURGERY, IR-RECOVERY at SURGERY SAME DAY [facility]   • VITRECTOMY POSTERIOR  10/11/2011    Performed by NAHUM GE at SURGERY SAME DAY [facility]   • RECOVERY  8/12/2011    Performed by SURGERY, IR-RECOVERY at SURGERY SAME DAY [facility]   • VITRECTOMY POSTERIOR  1/18/2011    Performed by NAHUM GE at SURGERY SAME DAY [facility]   • SCLERAL BUCKLING  1/18/2011    Performed by NAHUM GE at SURGERY SAME DAY [facility]   • AV FISTULOGRAM  9/17/2010    Performed by DAVID CASON at SURGERY [facility]S   • ANGIOPLASTY BALLOON  9/17/2010    Performed by DAVID CASON at SURGERY [facility]   • AV FISTULOGRAM  7/23/2010    Performed by DAVID CASON at SURGERY [facility]   • ANGIOPLASTY BALLOON  7/23/2010    Performed by DAVID CASON at SURGERY [facility] OR[facility]   • AV FISTULA REVISION  2/19/2010    Performed by WILLIE HATCH at SURGERY [facility] ORS   • AV FISTULA CREATION  2/12/2010    Performed by DAVID CASON at SURGERY [facility]   • CATARACT PHACO WITH IOL  4/8/08    Performed by STACEY PHILLIPS at SURGERY SAME DAY [facility]   • OTHER ORTHOPEDIC SURGERY  1998    right toe for facititis        MEDICATIONS:   Current Outpatient  Medications   Medication   • amoxicillin (AMOXIL) 500 MG Cap   • torsemide (DEMADEX) 10 MG tablet   • sulfamethoxazole-trimethoprim (BACTRIM) 400-80 MG Tab   • amiodarone (CORDARONE) 200 MG Tab   • tamsulosin (FLOMAX) 0.4 MG capsule   • metoprolol (LOPRESSOR) 50 MG Tab   • zolpidem (AMBIEN) 10 MG Tab   • warfarin (COUMADIN) 5 MG Tab   • omeprazole (PRILOSEC) 40 MG delayed-release capsule   • calcium acetate (PHOS-LO) 667 MG Cap   • Fluticasone-Umeclidin-Vilant (TRELEGY ELLIPTA) 100-62.5-25 MCG/INH AEROSOL POWDER, BREATH ACTIVATED   • levalbuterol (XOPENEX) 1.25 MG/3ML Nebu Soln   • pravastatin (PRAVACHOL) 20 MG Tab     No current facility-administered medications for this visit.        ALLERGIES:  No Known Allergies      SOCIAL HISTORY:   Social History     Socioeconomic History   • Marital status:      Spouse name: Not on file   • Number of children: Not on file   • Years of education: Not on file   • Highest education level: Not on file   Occupational History   • Not on file   Social Needs   • Financial resource strain: Not on file   • Food insecurity:     Worry: Not on file     Inability: Not on file   • Transportation needs:     Medical: Not on file     Non-medical: Not on file   Tobacco Use   • Smoking status: Former Smoker     Packs/day: 1.00     Years: 40.00     Pack years: 40.00     Types: Cigarettes     Last attempt to quit: 1/1/2009     Years since quitting: 10.7   • Smokeless tobacco: Never Used   • Tobacco comment: 1 pk a day for 35 yrs, QUIT JAN 1 2010   Substance and Sexual Activity   • Alcohol use: No     Alcohol/week: 0.0 oz   • Drug use: No   • Sexual activity: Never     Partners: Female     Comment: , two sons, 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   Lifestyle   • Physical activity:     Days per week: Not on file     Minutes per session: Not on file   • Stress: Not on file   Relationships   • Social connections:     Talks on phone: Not on file     Gets together: Not on file     Attends Worship  service: Not on file     Active member of club or organization: Not on file     Attends meetings of clubs or organizations: Not on file     Relationship status: Not on file   • Intimate partner violence:     Fear of current or ex partner: Not on file     Emotionally abused: Not on file     Physically abused: Not on file     Forced sexual activity: Not on file   Other Topics Concern   • Not on file   Social History Narrative   • Not on file       FAMILY HISTORY:   Family History   Problem Relation Age of Onset   • Stroke Mother    • Hypertension Mother    • Lung Disease Father         Emphysema, resp failure   • Genitourinary () Problems Maternal Aunt         hematuria   • Hypertension Brother        Wound Assessment:        Wound 09/12/19 Arterial Ulcer Toe (Comment which one) R distal hallux dry necrotic area, arterial (Active)   Wound Image    9/13/2019  4:00 PM   Site Assessment Dry;Black;Brown 9/13/2019  4:00 PM   Agata-wound Assessment Dry;Non-blanchable erythema;Purple 9/13/2019  4:00 PM   Margins Attached edges 9/13/2019  4:00 PM   Wound Length (cm) 1.8 cm 9/13/2019  4:00 PM   Wound Width (cm) 2.2 cm 9/13/2019  4:00 PM   Wound Depth (cm) 0 cm 9/13/2019  4:00 PM   Wound Surface Area (cm^2) 3.96 cm^2 9/13/2019  4:00 PM   Tunneling 0 cm 9/13/2019  4:00 PM   Undermining 0 cm 9/13/2019  4:00 PM   Closure Open to air 9/13/2019  4:00 PM   Drainage Amount None 9/13/2019  4:00 PM   Treatments Pharmaceutical agent;Other (Comment) 9/13/2019  4:00 PM   Cleansing Not Applicable 9/13/2019  4:00 PM   Dressing Cleansing/Solutions 3% Betadine 9/13/2019  4:00 PM   WOUND NURSE ONLY - Odor None 9/13/2019  4:00 PM   WOUND NURSE ONLY - Pulses Not palpable 9/13/2019  4:00 PM   WOUND NURSE ONLY - Exposed Structures None 9/13/2019  4:00 PM   WOUND NURSE ONLY - Tissue Type and Percentage 100% dry intact eschar 9/13/2019  4:00 PM       Wound Skin Tear Hand R dorsal hand skin tear (Active)   Wound Image   9/13/2019  4:00 PM   Site  Assessment Red;Yellow 9/13/2019  4:00 PM   Agata-wound Assessment Dry;Intact 9/13/2019  4:00 PM   Margins Attached edges 9/13/2019  4:00 PM   Wound Length (cm) 1.2 cm 9/13/2019  4:00 PM   Wound Width (cm) 1.5 cm 9/13/2019  4:00 PM   Wound Depth (cm) 0.1 cm 9/13/2019  4:00 PM   Wound Surface Area (cm^2) 1.8 cm^2 9/13/2019  4:00 PM   Tunneling 0 cm 9/13/2019  4:00 PM   Undermining 0 cm 9/13/2019  4:00 PM   Closure Secondary intention 9/13/2019  4:00 PM   Drainage Amount Scant 9/13/2019  4:00 PM   Drainage Description Serosanguineous 9/13/2019  4:00 PM   Non-staged Wound Description Full thickness 9/13/2019  4:00 PM   Treatments Cleansed 9/13/2019  4:00 PM   Cleansing Approved Wound Cleanser 9/13/2019  4:00 PM   Periwound Protectant Barrier Paste;Skin Protectant wipes to Periwound 9/13/2019  4:00 PM   Dressing Options Adhesive Foam;Nonadherent Contact Layer;Hydrofiber Silver 9/13/2019  4:00 PM   Dressing Cleansing/Solutions Normal Saline 9/13/2019  4:00 PM   Dressing Changed New 9/13/2019  4:00 PM   Dressing Status Clean;Dry;Intact 9/13/2019  4:00 PM   Dressing Change Frequency Every 72 hrs 9/13/2019  4:00 PM   WOUND NURSE ONLY - Odor None 9/13/2019  4:00 PM   WOUND NURSE ONLY - Exposed Structures None 9/13/2019  4:00 PM   WOUND NURSE ONLY - Tissue Type and Percentage 90% red viable, 10% yellow thin adherent  9/13/2019  4:00 PM            Pre-debridement Photo                Procedures:     Debridement : Wound on dorsal hand was non selectively debrided with gauze to remove biofilm.     Cleansed with:   NSS. ( R distal hallux painted with betadine)                                                                       Periwound protected with: skin prep and barrier paste to R dorsal hand wound   Primary dressing:mepitel non stick WCL, then moist AqAg to hand; R distal hallux painted eschar with betadine   Secondary Dressing:ad foam to hand   Other:           PATIENT EDUCATION  -Advised to go to ER for any increased  redness, swelling, drainage or odor, or if patient develops fever, chills, nausea or vomiting.  -Importance of adequate nutrition for wound healing  -Increase protein intake (unless contraindicated by renal status)  _arterial precautions - no compression, avoid tight socks or footwear, wear closed protective shoes, suggest real sheepskin line mocassin type shoes  _f/u with vascular surgeon.

## 2019-09-12 NOTE — PATIENT INSTRUCTIONS
Should you experience any significant changes in your wound(s) such as infection (redness, swelling, localized heat, increased pain, fever >101 F, chills) or have any questions regarding your home care instructions, please contact the wound center [phone]. If after hours, contact your primary care physician or go the hospital emergency room.  R hand - Keep dressing clean and dry and cover while bathing. Only change dressing if over saturated, soiled or its falling off.   R big toe - paint area with betadine daily. Keep clean and dry. When sleeping, place pillow under calf to keep heels floated and off the bed.    PATIENT EDUCATION   ARTERIAL    -Avoid compression and/or constrictive garments.  -Elevate lower extremities as tolerated  -Discussed importance of smoking cessation.  -Advised to go to ER for any increased redness, swelling, drainage or odor, or if patient develops fever, chills, nausea or vomiting.

## 2019-09-13 ENCOUNTER — HOSPITAL ENCOUNTER (INPATIENT)
Facility: MEDICAL CENTER | Age: 79
LOS: 2 days | DRG: 270 | End: 2019-09-15
Attending: EMERGENCY MEDICINE | Admitting: HOSPITALIST
Payer: MEDICARE

## 2019-09-13 ENCOUNTER — APPOINTMENT (OUTPATIENT)
Dept: RADIOLOGY | Facility: MEDICAL CENTER | Age: 79
DRG: 270 | End: 2019-09-13
Attending: SURGERY
Payer: MEDICARE

## 2019-09-13 DIAGNOSIS — I99.8 ISCHEMIC FOOT: ICD-10-CM

## 2019-09-13 DIAGNOSIS — Z79.01 CHRONIC ANTICOAGULATION: ICD-10-CM

## 2019-09-13 DIAGNOSIS — N18.4 CRF (CHRONIC RENAL FAILURE), STAGE 4 (SEVERE) (HCC): ICD-10-CM

## 2019-09-13 PROBLEM — E87.20 LACTIC ACIDOSIS: Status: ACTIVE | Noted: 2019-09-13

## 2019-09-13 PROBLEM — E87.6 HYPOKALEMIA: Status: ACTIVE | Noted: 2019-09-13

## 2019-09-13 PROBLEM — D61.818 PANCYTOPENIA (HCC): Status: ACTIVE | Noted: 2019-09-13

## 2019-09-13 PROBLEM — I96 ISCHEMIC NECROSIS OF FOOT (HCC): Status: ACTIVE | Noted: 2019-09-13

## 2019-09-13 PROBLEM — I50.9 CHF (CONGESTIVE HEART FAILURE) (HCC): Status: ACTIVE | Noted: 2019-09-13

## 2019-09-13 LAB
ALBUMIN SERPL BCP-MCNC: 3.8 G/DL (ref 3.2–4.9)
ALBUMIN/GLOB SERPL: 1.3 G/DL
ALP SERPL-CCNC: 111 U/L (ref 30–99)
ALT SERPL-CCNC: 7 U/L (ref 2–50)
ANION GAP SERPL CALC-SCNC: 12 MMOL/L (ref 0–11.9)
ANISOCYTOSIS BLD QL SMEAR: ABNORMAL
APTT PPP: 39.4 SEC (ref 24.7–36)
AST SERPL-CCNC: 18 U/L (ref 12–45)
BASOPHILS # BLD AUTO: 0.5 % (ref 0–1.8)
BASOPHILS # BLD: 0.02 K/UL (ref 0–0.12)
BILIRUB SERPL-MCNC: 0.7 MG/DL (ref 0.1–1.5)
BUN SERPL-MCNC: 24 MG/DL (ref 8–22)
CALCIUM SERPL-MCNC: 9.2 MG/DL (ref 8.5–10.5)
CHLORIDE SERPL-SCNC: 95 MMOL/L (ref 96–112)
CO2 SERPL-SCNC: 30 MMOL/L (ref 20–33)
COMMENT 1642: NORMAL
CREAT SERPL-MCNC: 4.21 MG/DL (ref 0.5–1.4)
EOSINOPHIL # BLD AUTO: 0.11 K/UL (ref 0–0.51)
EOSINOPHIL NFR BLD: 2.8 % (ref 0–6.9)
ERYTHROCYTE [DISTWIDTH] IN BLOOD BY AUTOMATED COUNT: 72.6 FL (ref 35.9–50)
FERRITIN SERPL-MCNC: 294.5 NG/ML (ref 22–322)
FOLATE SERPL-MCNC: 8.4 NG/ML
GLOBULIN SER CALC-MCNC: 3 G/DL (ref 1.9–3.5)
GLUCOSE SERPL-MCNC: 98 MG/DL (ref 65–99)
HCT VFR BLD AUTO: 32.2 % (ref 42–52)
HGB BLD-MCNC: 9.2 G/DL (ref 14–18)
HGB RETIC QN AUTO: 28.5 PG/CELL (ref 29–35)
HYPOCHROMIA BLD QL SMEAR: ABNORMAL
IMM GRANULOCYTES # BLD AUTO: 0.01 K/UL (ref 0–0.11)
IMM GRANULOCYTES NFR BLD AUTO: 0.3 % (ref 0–0.9)
IMM RETICS NFR: 10.7 % (ref 9.3–17.4)
INR PPP: 2.28 (ref 0.87–1.13)
IRON SATN MFR SERPL: 15 % (ref 15–55)
IRON SERPL-MCNC: 46 UG/DL (ref 50–180)
LACTATE BLD-SCNC: 2.7 MMOL/L (ref 0.5–2)
LYMPHOCYTES # BLD AUTO: 0.68 K/UL (ref 1–4.8)
LYMPHOCYTES NFR BLD: 17.6 % (ref 22–41)
MACROCYTES BLD QL SMEAR: ABNORMAL
MCH RBC QN AUTO: 29.9 PG (ref 27–33)
MCHC RBC AUTO-ENTMCNC: 28.6 G/DL (ref 33.7–35.3)
MCV RBC AUTO: 104.5 FL (ref 81.4–97.8)
MONOCYTES # BLD AUTO: 0.47 K/UL (ref 0–0.85)
MONOCYTES NFR BLD AUTO: 12.1 % (ref 0–13.4)
MORPHOLOGY BLD-IMP: NORMAL
NEUTROPHILS # BLD AUTO: 2.58 K/UL (ref 1.82–7.42)
NEUTROPHILS NFR BLD: 66.7 % (ref 44–72)
NRBC # BLD AUTO: 0 K/UL
NRBC BLD-RTO: 0 /100 WBC
OVALOCYTES BLD QL SMEAR: NORMAL
PLATELET # BLD AUTO: 132 K/UL (ref 164–446)
PLATELET BLD QL SMEAR: NORMAL
PMV BLD AUTO: 10.1 FL (ref 9–12.9)
POIKILOCYTOSIS BLD QL SMEAR: NORMAL
POLYCHROMASIA BLD QL SMEAR: NORMAL
POTASSIUM SERPL-SCNC: 3.4 MMOL/L (ref 3.6–5.5)
PROT SERPL-MCNC: 6.8 G/DL (ref 6–8.2)
PROTHROMBIN TIME: 25.9 SEC (ref 12–14.6)
RBC # BLD AUTO: 3.08 M/UL (ref 4.7–6.1)
RBC BLD AUTO: PRESENT
RETICS # AUTO: 0.09 M/UL (ref 0.04–0.06)
RETICS/RBC NFR: 2.9 % (ref 0.8–2.1)
SODIUM SERPL-SCNC: 137 MMOL/L (ref 135–145)
TIBC SERPL-MCNC: 297 UG/DL (ref 250–450)
VIT B12 SERPL-MCNC: 916 PG/ML (ref 211–911)
WBC # BLD AUTO: 3.9 K/UL (ref 4.8–10.8)

## 2019-09-13 PROCEDURE — 700111 HCHG RX REV CODE 636 W/ 250 OVERRIDE (IP): Performed by: SURGERY

## 2019-09-13 PROCEDURE — 99285 EMERGENCY DEPT VISIT HI MDM: CPT

## 2019-09-13 PROCEDURE — 82607 VITAMIN B-12: CPT

## 2019-09-13 PROCEDURE — 700102 HCHG RX REV CODE 250 W/ 637 OVERRIDE(OP): Performed by: HOSPITALIST

## 2019-09-13 PROCEDURE — 85730 THROMBOPLASTIN TIME PARTIAL: CPT

## 2019-09-13 PROCEDURE — 5A1D70Z PERFORMANCE OF URINARY FILTRATION, INTERMITTENT, LESS THAN 6 HOURS PER DAY: ICD-10-PCS | Performed by: INTERNAL MEDICINE

## 2019-09-13 PROCEDURE — 36415 COLL VENOUS BLD VENIPUNCTURE: CPT

## 2019-09-13 PROCEDURE — A9270 NON-COVERED ITEM OR SERVICE: HCPCS | Performed by: HOSPITALIST

## 2019-09-13 PROCEDURE — 83540 ASSAY OF IRON: CPT

## 2019-09-13 PROCEDURE — 82728 ASSAY OF FERRITIN: CPT

## 2019-09-13 PROCEDURE — 83605 ASSAY OF LACTIC ACID: CPT

## 2019-09-13 PROCEDURE — 85610 PROTHROMBIN TIME: CPT

## 2019-09-13 PROCEDURE — 83550 IRON BINDING TEST: CPT

## 2019-09-13 PROCEDURE — 87040 BLOOD CULTURE FOR BACTERIA: CPT

## 2019-09-13 PROCEDURE — 82746 ASSAY OF FOLIC ACID SERUM: CPT

## 2019-09-13 PROCEDURE — 85046 RETICYTE/HGB CONCENTRATE: CPT

## 2019-09-13 PROCEDURE — 85025 COMPLETE CBC W/AUTO DIFF WBC: CPT

## 2019-09-13 PROCEDURE — 770006 HCHG ROOM/CARE - MED/SURG/GYN SEMI*

## 2019-09-13 PROCEDURE — 80053 COMPREHEN METABOLIC PANEL: CPT

## 2019-09-13 PROCEDURE — 99223 1ST HOSP IP/OBS HIGH 75: CPT | Mod: AI | Performed by: HOSPITALIST

## 2019-09-13 RX ORDER — TORSEMIDE 5 MG/1
10 TABLET ORAL DAILY
Status: DISCONTINUED | OUTPATIENT
Start: 2019-09-14 | End: 2019-09-15 | Stop reason: HOSPADM

## 2019-09-13 RX ORDER — HEPARIN SODIUM 1000 [USP'U]/ML
3200 INJECTION, SOLUTION INTRAVENOUS; SUBCUTANEOUS PRN
Status: DISCONTINUED | OUTPATIENT
Start: 2019-09-13 | End: 2019-09-14

## 2019-09-13 RX ORDER — HEPARIN SODIUM 1000 [USP'U]/ML
6000 INJECTION, SOLUTION INTRAVENOUS; SUBCUTANEOUS ONCE
Status: COMPLETED | OUTPATIENT
Start: 2019-09-13 | End: 2019-09-13

## 2019-09-13 RX ORDER — FLUTICASONE PROPIONATE 44 UG/1
2 AEROSOL, METERED RESPIRATORY (INHALATION)
Status: DISCONTINUED | OUTPATIENT
Start: 2019-09-14 | End: 2019-09-14

## 2019-09-13 RX ORDER — METOPROLOL TARTRATE 50 MG/1
50 TABLET, FILM COATED ORAL 2 TIMES DAILY
Status: DISCONTINUED | OUTPATIENT
Start: 2019-09-13 | End: 2019-09-15 | Stop reason: HOSPADM

## 2019-09-13 RX ORDER — AMIODARONE HYDROCHLORIDE 200 MG/1
200 TABLET ORAL 2 TIMES DAILY
Status: DISCONTINUED | OUTPATIENT
Start: 2019-09-13 | End: 2019-09-15 | Stop reason: HOSPADM

## 2019-09-13 RX ORDER — HEPARIN SODIUM 5000 [USP'U]/100ML
INJECTION, SOLUTION INTRAVENOUS CONTINUOUS
Status: DISCONTINUED | OUTPATIENT
Start: 2019-09-13 | End: 2019-09-14

## 2019-09-13 RX ORDER — WARFARIN SODIUM 5 MG/1
2.5-5 TABLET ORAL DAILY
COMMUNITY
End: 2019-10-24

## 2019-09-13 RX ORDER — HEPARIN SODIUM 5000 [USP'U]/100ML
INJECTION, SOLUTION INTRAVENOUS CONTINUOUS
Status: DISCONTINUED | OUTPATIENT
Start: 2019-09-13 | End: 2019-09-13

## 2019-09-13 RX ORDER — ZOLPIDEM TARTRATE 5 MG/1
10 TABLET ORAL NIGHTLY PRN
Status: DISCONTINUED | OUTPATIENT
Start: 2019-09-13 | End: 2019-09-15 | Stop reason: HOSPADM

## 2019-09-13 RX ORDER — PRAVASTATIN SODIUM 20 MG
20 TABLET ORAL NIGHTLY
Status: DISCONTINUED | OUTPATIENT
Start: 2019-09-13 | End: 2019-09-15 | Stop reason: HOSPADM

## 2019-09-13 RX ORDER — OXYCODONE HYDROCHLORIDE 10 MG/1
10 TABLET ORAL
Status: DISCONTINUED | OUTPATIENT
Start: 2019-09-13 | End: 2019-09-15 | Stop reason: HOSPADM

## 2019-09-13 RX ORDER — ONDANSETRON 4 MG/1
4 TABLET, ORALLY DISINTEGRATING ORAL EVERY 4 HOURS PRN
Status: DISCONTINUED | OUTPATIENT
Start: 2019-09-13 | End: 2019-09-15 | Stop reason: HOSPADM

## 2019-09-13 RX ORDER — TAMSULOSIN HYDROCHLORIDE 0.4 MG/1
0.4 CAPSULE ORAL
Status: DISCONTINUED | OUTPATIENT
Start: 2019-09-14 | End: 2019-09-15 | Stop reason: HOSPADM

## 2019-09-13 RX ORDER — OXYCODONE HYDROCHLORIDE 5 MG/1
5 TABLET ORAL
Status: DISCONTINUED | OUTPATIENT
Start: 2019-09-13 | End: 2019-09-15 | Stop reason: HOSPADM

## 2019-09-13 RX ORDER — POLYETHYLENE GLYCOL 3350 17 G/17G
1 POWDER, FOR SOLUTION ORAL
Status: DISCONTINUED | OUTPATIENT
Start: 2019-09-13 | End: 2019-09-15 | Stop reason: HOSPADM

## 2019-09-13 RX ORDER — AMOXICILLIN 250 MG
2 CAPSULE ORAL 2 TIMES DAILY
Status: DISCONTINUED | OUTPATIENT
Start: 2019-09-14 | End: 2019-09-15 | Stop reason: HOSPADM

## 2019-09-13 RX ORDER — MORPHINE SULFATE 4 MG/ML
4 INJECTION, SOLUTION INTRAMUSCULAR; INTRAVENOUS
Status: DISCONTINUED | OUTPATIENT
Start: 2019-09-13 | End: 2019-09-15 | Stop reason: HOSPADM

## 2019-09-13 RX ORDER — ZOLPIDEM TARTRATE 10 MG/1
10 TABLET ORAL NIGHTLY PRN
COMMUNITY
End: 2019-10-24

## 2019-09-13 RX ORDER — BISACODYL 10 MG
10 SUPPOSITORY, RECTAL RECTAL
Status: DISCONTINUED | OUTPATIENT
Start: 2019-09-13 | End: 2019-09-15 | Stop reason: HOSPADM

## 2019-09-13 RX ORDER — OMEPRAZOLE 20 MG/1
40 CAPSULE, DELAYED RELEASE ORAL EVERY MORNING
Status: DISCONTINUED | OUTPATIENT
Start: 2019-09-14 | End: 2019-09-15 | Stop reason: HOSPADM

## 2019-09-13 RX ORDER — ONDANSETRON 2 MG/ML
4 INJECTION INTRAMUSCULAR; INTRAVENOUS EVERY 4 HOURS PRN
Status: DISCONTINUED | OUTPATIENT
Start: 2019-09-13 | End: 2019-09-15 | Stop reason: HOSPADM

## 2019-09-13 RX ADMIN — HEPARIN SODIUM 1200 UNITS/HR: 5000 INJECTION, SOLUTION INTRAVENOUS at 23:46

## 2019-09-13 RX ADMIN — METOPROLOL TARTRATE 50 MG: 50 TABLET ORAL at 22:39

## 2019-09-13 RX ADMIN — HEPARIN SODIUM 6000 UNITS: 1000 INJECTION, SOLUTION INTRAVENOUS; SUBCUTANEOUS at 23:45

## 2019-09-13 RX ADMIN — AMIODARONE HYDROCHLORIDE 200 MG: 200 TABLET ORAL at 22:39

## 2019-09-13 RX ADMIN — PRAVASTATIN SODIUM 20 MG: 20 TABLET ORAL at 22:39

## 2019-09-13 ASSESSMENT — PATIENT HEALTH QUESTIONNAIRE - PHQ9
2. FEELING DOWN, DEPRESSED, IRRITABLE, OR HOPELESS: NOT AT ALL
SUM OF ALL RESPONSES TO PHQ9 QUESTIONS 1 AND 2: 0
1. LITTLE INTEREST OR PLEASURE IN DOING THINGS: NOT AT ALL

## 2019-09-13 ASSESSMENT — LIFESTYLE VARIABLES
ON A TYPICAL DAY WHEN YOU DRINK ALCOHOL HOW MANY DRINKS DO YOU HAVE: 0
AVERAGE NUMBER OF DAYS PER WEEK YOU HAVE A DRINK CONTAINING ALCOHOL: 0
HAVE PEOPLE ANNOYED YOU BY CRITICIZING YOUR DRINKING: NO
CONSUMPTION TOTAL: NEGATIVE
TOTAL SCORE: 0
DOES PATIENT WANT TO STOP DRINKING: NO
ALCOHOL_USE: NO
HAVE YOU EVER FELT YOU SHOULD CUT DOWN ON YOUR DRINKING: NO
TOTAL SCORE: 0
HOW MANY TIMES IN THE PAST YEAR HAVE YOU HAD 5 OR MORE DRINKS IN A DAY: 0
EVER_SMOKED: YES
EVER HAD A DRINK FIRST THING IN THE MORNING TO STEADY YOUR NERVES TO GET RID OF A HANGOVER: NO
TOTAL SCORE: 0
EVER FELT BAD OR GUILTY ABOUT YOUR DRINKING: NO
SUBSTANCE_ABUSE: 0

## 2019-09-13 ASSESSMENT — ENCOUNTER SYMPTOMS
DEPRESSION: 1
COUGH: 0
FEVER: 0
VOMITING: 0
HALLUCINATIONS: 0
EYES NEGATIVE: 1
WEAKNESS: 0
BLURRED VISION: 0
DEPRESSION: 0
GASTROINTESTINAL NEGATIVE: 1
NAUSEA: 0
HEMOPTYSIS: 0
MYALGIAS: 0
FLANK PAIN: 0
CLAUDICATION: 0
MYALGIAS: 1
ORTHOPNEA: 0
CHILLS: 0
ABDOMINAL PAIN: 0
FOCAL WEAKNESS: 0
EYE DISCHARGE: 0
DIZZINESS: 0
SHORTNESS OF BREATH: 0
RESPIRATORY NEGATIVE: 1
SPEECH CHANGE: 0
SENSORY CHANGE: 1
DOUBLE VISION: 0
SENSORY CHANGE: 0
HEARTBURN: 0
PALPITATIONS: 0
BRUISES/BLEEDS EASILY: 0

## 2019-09-13 ASSESSMENT — COGNITIVE AND FUNCTIONAL STATUS - GENERAL
DRESSING REGULAR LOWER BODY CLOTHING: A LITTLE
WALKING IN HOSPITAL ROOM: A LITTLE
SUGGESTED CMS G CODE MODIFIER DAILY ACTIVITY: CJ
MOVING FROM LYING ON BACK TO SITTING ON SIDE OF FLAT BED: A LITTLE
MOBILITY SCORE: 19
MOVING TO AND FROM BED TO CHAIR: A LITTLE
STANDING UP FROM CHAIR USING ARMS: A LITTLE
HELP NEEDED FOR BATHING: A LITTLE
SUGGESTED CMS G CODE MODIFIER MOBILITY: CK
CLIMB 3 TO 5 STEPS WITH RAILING: A LITTLE
DAILY ACTIVITIY SCORE: 22

## 2019-09-13 NOTE — NON-PROVIDER
I spoke to Dr. Peters about this pt. He was scanned at her office. She says she will review the arterial scans asap, has not been able to do so yet. Her office will notify pt for f/u.

## 2019-09-14 ENCOUNTER — APPOINTMENT (OUTPATIENT)
Dept: RADIOLOGY | Facility: MEDICAL CENTER | Age: 79
DRG: 270 | End: 2019-09-14
Attending: SURGERY
Payer: MEDICARE

## 2019-09-14 LAB
ALBUMIN SERPL BCP-MCNC: 3.6 G/DL (ref 3.2–4.9)
ALBUMIN/GLOB SERPL: 1.4 G/DL
ALP SERPL-CCNC: 110 U/L (ref 30–99)
ALT SERPL-CCNC: 7 U/L (ref 2–50)
ANION GAP SERPL CALC-SCNC: 10 MMOL/L (ref 0–11.9)
ANISOCYTOSIS BLD QL SMEAR: ABNORMAL
APTT PPP: >240 SEC (ref 24.7–36)
AST SERPL-CCNC: 16 U/L (ref 12–45)
BASOPHILS # BLD AUTO: 0.6 % (ref 0–1.8)
BASOPHILS # BLD: 0.02 K/UL (ref 0–0.12)
BILIRUB SERPL-MCNC: 0.7 MG/DL (ref 0.1–1.5)
BUN SERPL-MCNC: 35 MG/DL (ref 8–22)
BURR CELLS BLD QL SMEAR: NORMAL
CALCIUM SERPL-MCNC: 9.2 MG/DL (ref 8.5–10.5)
CHLORIDE SERPL-SCNC: 97 MMOL/L (ref 96–112)
CO2 SERPL-SCNC: 30 MMOL/L (ref 20–33)
COMMENT 1642: NORMAL
CREAT SERPL-MCNC: 5.36 MG/DL (ref 0.5–1.4)
EOSINOPHIL # BLD AUTO: 0.12 K/UL (ref 0–0.51)
EOSINOPHIL NFR BLD: 3.6 % (ref 0–6.9)
ERYTHROCYTE [DISTWIDTH] IN BLOOD BY AUTOMATED COUNT: 72.2 FL (ref 35.9–50)
GLOBULIN SER CALC-MCNC: 2.6 G/DL (ref 1.9–3.5)
GLUCOSE SERPL-MCNC: 93 MG/DL (ref 65–99)
HCT VFR BLD AUTO: 30.2 % (ref 42–52)
HGB BLD-MCNC: 8.7 G/DL (ref 14–18)
HYPOCHROMIA BLD QL SMEAR: ABNORMAL
IMM GRANULOCYTES # BLD AUTO: 0.01 K/UL (ref 0–0.11)
IMM GRANULOCYTES NFR BLD AUTO: 0.3 % (ref 0–0.9)
INR PPP: 2.49 (ref 0.87–1.13)
LACTATE BLD-SCNC: 1.2 MMOL/L (ref 0.5–2)
LYMPHOCYTES # BLD AUTO: 0.57 K/UL (ref 1–4.8)
LYMPHOCYTES NFR BLD: 16.9 % (ref 22–41)
MACROCYTES BLD QL SMEAR: ABNORMAL
MCH RBC QN AUTO: 30 PG (ref 27–33)
MCHC RBC AUTO-ENTMCNC: 28.8 G/DL (ref 33.7–35.3)
MCV RBC AUTO: 104.1 FL (ref 81.4–97.8)
MONOCYTES # BLD AUTO: 0.44 K/UL (ref 0–0.85)
MONOCYTES NFR BLD AUTO: 13 % (ref 0–13.4)
MORPHOLOGY BLD-IMP: NORMAL
NEUTROPHILS # BLD AUTO: 2.22 K/UL (ref 1.82–7.42)
NEUTROPHILS NFR BLD: 65.6 % (ref 44–72)
NRBC # BLD AUTO: 0 K/UL
NRBC BLD-RTO: 0 /100 WBC
OVALOCYTES BLD QL SMEAR: NORMAL
PLATELET # BLD AUTO: 128 K/UL (ref 164–446)
PLATELET BLD QL SMEAR: NORMAL
PMV BLD AUTO: 9.8 FL (ref 9–12.9)
POIKILOCYTOSIS BLD QL SMEAR: NORMAL
POLYCHROMASIA BLD QL SMEAR: NORMAL
POTASSIUM SERPL-SCNC: 3.9 MMOL/L (ref 3.6–5.5)
PROT SERPL-MCNC: 6.2 G/DL (ref 6–8.2)
PROTHROMBIN TIME: 27.8 SEC (ref 12–14.6)
RBC # BLD AUTO: 2.9 M/UL (ref 4.7–6.1)
RBC BLD AUTO: PRESENT
SODIUM SERPL-SCNC: 137 MMOL/L (ref 135–145)
WBC # BLD AUTO: 3.4 K/UL (ref 4.8–10.8)

## 2019-09-14 PROCEDURE — 99153 MOD SED SAME PHYS/QHP EA: CPT

## 2019-09-14 PROCEDURE — B4101ZZ FLUOROSCOPY OF ABDOMINAL AORTA USING LOW OSMOLAR CONTRAST: ICD-10-PCS | Performed by: SURGERY

## 2019-09-14 PROCEDURE — 36415 COLL VENOUS BLD VENIPUNCTURE: CPT

## 2019-09-14 PROCEDURE — 73706 CT ANGIO LWR EXTR W/O&W/DYE: CPT | Mod: RT

## 2019-09-14 PROCEDURE — 700102 HCHG RX REV CODE 250 W/ 637 OVERRIDE(OP): Performed by: SURGERY

## 2019-09-14 PROCEDURE — A9270 NON-COVERED ITEM OR SERVICE: HCPCS | Performed by: HOSPITALIST

## 2019-09-14 PROCEDURE — A9270 NON-COVERED ITEM OR SERVICE: HCPCS | Performed by: SURGERY

## 2019-09-14 PROCEDURE — 047M3ZZ DILATION OF RIGHT POPLITEAL ARTERY, PERCUTANEOUS APPROACH: ICD-10-PCS | Performed by: SURGERY

## 2019-09-14 PROCEDURE — 700117 HCHG RX CONTRAST REV CODE 255: Performed by: SURGERY

## 2019-09-14 PROCEDURE — 99222 1ST HOSP IP/OBS MODERATE 55: CPT | Performed by: INTERNAL MEDICINE

## 2019-09-14 PROCEDURE — 700102 HCHG RX REV CODE 250 W/ 637 OVERRIDE(OP): Performed by: HOSPITALIST

## 2019-09-14 PROCEDURE — 99232 SBSQ HOSP IP/OBS MODERATE 35: CPT | Performed by: INTERNAL MEDICINE

## 2019-09-14 PROCEDURE — 770006 HCHG ROOM/CARE - MED/SURG/GYN SEMI*

## 2019-09-14 PROCEDURE — 80053 COMPREHEN METABOLIC PANEL: CPT

## 2019-09-14 PROCEDURE — 94760 N-INVAS EAR/PLS OXIMETRY 1: CPT

## 2019-09-14 PROCEDURE — 85025 COMPLETE CBC W/AUTO DIFF WBC: CPT

## 2019-09-14 PROCEDURE — 700111 HCHG RX REV CODE 636 W/ 250 OVERRIDE (IP)

## 2019-09-14 PROCEDURE — 04CM3ZZ EXTIRPATION OF MATTER FROM RIGHT POPLITEAL ARTERY, PERCUTANEOUS APPROACH: ICD-10-PCS | Performed by: SURGERY

## 2019-09-14 PROCEDURE — B41F1ZZ FLUOROSCOPY OF RIGHT LOWER EXTREMITY ARTERIES USING LOW OSMOLAR CONTRAST: ICD-10-PCS | Performed by: SURGERY

## 2019-09-14 PROCEDURE — 85610 PROTHROMBIN TIME: CPT

## 2019-09-14 PROCEDURE — 83605 ASSAY OF LACTIC ACID: CPT

## 2019-09-14 PROCEDURE — 94664 DEMO&/EVAL PT USE INHALER: CPT

## 2019-09-14 PROCEDURE — 85730 THROMBOPLASTIN TIME PARTIAL: CPT

## 2019-09-14 PROCEDURE — 700111 HCHG RX REV CODE 636 W/ 250 OVERRIDE (IP): Performed by: SURGERY

## 2019-09-14 RX ORDER — HEPARIN SODIUM 1000 [USP'U]/ML
4000 INJECTION, SOLUTION INTRAVENOUS; SUBCUTANEOUS ONCE
Status: DISCONTINUED | OUTPATIENT
Start: 2019-09-14 | End: 2019-09-14

## 2019-09-14 RX ORDER — MIDAZOLAM HYDROCHLORIDE 1 MG/ML
.5-2 INJECTION INTRAMUSCULAR; INTRAVENOUS PRN
Status: ACTIVE | OUTPATIENT
Start: 2019-09-14 | End: 2019-09-14

## 2019-09-14 RX ORDER — ONDANSETRON 2 MG/ML
4 INJECTION INTRAMUSCULAR; INTRAVENOUS PRN
Status: ACTIVE | OUTPATIENT
Start: 2019-09-14 | End: 2019-09-14

## 2019-09-14 RX ORDER — FLUTICASONE PROPIONATE 44 UG/1
2 AEROSOL, METERED RESPIRATORY (INHALATION)
Status: DISCONTINUED | OUTPATIENT
Start: 2019-09-15 | End: 2019-09-15 | Stop reason: HOSPADM

## 2019-09-14 RX ORDER — FLUTICASONE PROPIONATE 44 UG/1
2 AEROSOL, METERED RESPIRATORY (INHALATION) EVERY 12 HOURS
Status: DISCONTINUED | OUTPATIENT
Start: 2019-09-14 | End: 2019-09-14

## 2019-09-14 RX ORDER — IODIXANOL 270 MG/ML
86 INJECTION, SOLUTION INTRAVASCULAR ONCE
Status: COMPLETED | OUTPATIENT
Start: 2019-09-14 | End: 2019-09-14

## 2019-09-14 RX ORDER — HEPARIN SODIUM,PORCINE 1000/ML
VIAL (ML) INJECTION
Status: COMPLETED
Start: 2019-09-14 | End: 2019-09-14

## 2019-09-14 RX ORDER — WARFARIN SODIUM 5 MG/1
5 TABLET ORAL ONCE
Status: COMPLETED | OUTPATIENT
Start: 2019-09-14 | End: 2019-09-14

## 2019-09-14 RX ORDER — SODIUM CHLORIDE 9 MG/ML
500 INJECTION, SOLUTION INTRAVENOUS
Status: ACTIVE | OUTPATIENT
Start: 2019-09-14 | End: 2019-09-14

## 2019-09-14 RX ORDER — MIDAZOLAM HYDROCHLORIDE 1 MG/ML
INJECTION INTRAMUSCULAR; INTRAVENOUS
Status: COMPLETED
Start: 2019-09-14 | End: 2019-09-14

## 2019-09-14 RX ORDER — VERAPAMIL HYDROCHLORIDE 2.5 MG/ML
INJECTION, SOLUTION INTRAVENOUS
Status: COMPLETED
Start: 2019-09-14 | End: 2019-09-14

## 2019-09-14 RX ADMIN — OXYCODONE HYDROCHLORIDE 10 MG: 10 TABLET ORAL at 12:47

## 2019-09-14 RX ADMIN — IOHEXOL 100 ML: 350 INJECTION, SOLUTION INTRAVENOUS at 08:15

## 2019-09-14 RX ADMIN — WARFARIN SODIUM 5 MG: 5 TABLET ORAL at 21:07

## 2019-09-14 RX ADMIN — IODIXANOL 86 ML: 270 INJECTION, SOLUTION INTRAVASCULAR at 16:40

## 2019-09-14 RX ADMIN — MIDAZOLAM 1 MG: 1 INJECTION INTRAMUSCULAR; INTRAVENOUS at 15:41

## 2019-09-14 RX ADMIN — MIDAZOLAM HYDROCHLORIDE 2 MG: 1 INJECTION, SOLUTION INTRAMUSCULAR; INTRAVENOUS at 14:41

## 2019-09-14 RX ADMIN — HEPARIN SODIUM 4000 UNITS: 1000 INJECTION, SOLUTION INTRAVENOUS; SUBCUTANEOUS at 15:15

## 2019-09-14 RX ADMIN — FENTANYL CITRATE 25 MCG: 50 INJECTION, SOLUTION INTRAMUSCULAR; INTRAVENOUS at 15:41

## 2019-09-14 RX ADMIN — VERAPAMIL HYDROCHLORIDE 5 MG: 2.5 INJECTION, SOLUTION INTRAVENOUS at 15:37

## 2019-09-14 RX ADMIN — PRAVASTATIN SODIUM 20 MG: 20 TABLET ORAL at 21:07

## 2019-09-14 RX ADMIN — NITROGLYCERIN 25 ML: 20 INJECTION INTRAVENOUS at 15:37

## 2019-09-14 RX ADMIN — FENTANYL CITRATE 25 MCG: 50 INJECTION, SOLUTION INTRAMUSCULAR; INTRAVENOUS at 15:02

## 2019-09-14 RX ADMIN — AMIODARONE HYDROCHLORIDE 200 MG: 200 TABLET ORAL at 05:21

## 2019-09-14 RX ADMIN — FENTANYL CITRATE 25 MCG: 50 INJECTION, SOLUTION INTRAMUSCULAR; INTRAVENOUS at 16:03

## 2019-09-14 RX ADMIN — AMIODARONE HYDROCHLORIDE 200 MG: 200 TABLET ORAL at 21:07

## 2019-09-14 RX ADMIN — MIDAZOLAM 2 MG: 1 INJECTION INTRAMUSCULAR; INTRAVENOUS at 14:41

## 2019-09-14 RX ADMIN — FLUTICASONE PROPIONATE 88 MCG: 44 AEROSOL, METERED RESPIRATORY (INHALATION) at 09:13

## 2019-09-14 RX ADMIN — MIDAZOLAM 1 MG: 1 INJECTION INTRAMUSCULAR; INTRAVENOUS at 16:32

## 2019-09-14 RX ADMIN — FENTANYL CITRATE 25 MCG: 50 INJECTION, SOLUTION INTRAMUSCULAR; INTRAVENOUS at 14:42

## 2019-09-14 RX ADMIN — MIDAZOLAM 1 MG: 1 INJECTION INTRAMUSCULAR; INTRAVENOUS at 15:02

## 2019-09-14 RX ADMIN — FENTANYL CITRATE 25 MCG: 50 INJECTION, SOLUTION INTRAMUSCULAR; INTRAVENOUS at 16:32

## 2019-09-14 RX ADMIN — UMECLIDINIUM BROMIDE AND VILANTEROL TRIFENATATE 1 PUFF: 62.5; 25 POWDER RESPIRATORY (INHALATION) at 09:13

## 2019-09-14 RX ADMIN — OXYCODONE HYDROCHLORIDE 10 MG: 10 TABLET ORAL at 22:45

## 2019-09-14 ASSESSMENT — ENCOUNTER SYMPTOMS
BLURRED VISION: 0
FLANK PAIN: 0
WEAKNESS: 0
DIZZINESS: 0
PALPITATIONS: 0
BRUISES/BLEEDS EASILY: 0
SHORTNESS OF BREATH: 0
CHILLS: 0
FEVER: 0
ABDOMINAL PAIN: 0
SPEECH CHANGE: 0
DEPRESSION: 0
NAUSEA: 0
FOCAL WEAKNESS: 0
HALLUCINATIONS: 0
MYALGIAS: 0
SENSORY CHANGE: 0
EYE DISCHARGE: 0
VOMITING: 0
HEARTBURN: 0
HEMOPTYSIS: 0
DOUBLE VISION: 0
COUGH: 0

## 2019-09-14 ASSESSMENT — COPD QUESTIONNAIRES
COPD SCREENING SCORE: 7
HAVE YOU SMOKED AT LEAST 100 CIGARETTES IN YOUR ENTIRE LIFE: YES
DURING THE PAST 4 WEEKS HOW MUCH DID YOU FEEL SHORT OF BREATH: SOME OF THE TIME
DO YOU EVER COUGH UP ANY MUCUS OR PHLEGM?: YES, A FEW DAYS A WEEK OR MONTH

## 2019-09-14 ASSESSMENT — LIFESTYLE VARIABLES
SUBSTANCE_ABUSE: 0
EVER_SMOKED: YES

## 2019-09-14 NOTE — ED TRIAGE NOTES
Chief Complaint   Patient presents with   • Sent by MD   • Wound Check   Pt to triage in NAD.  Pt was at dialysis when he was noted to have necrotic right big toe.  Right foot red, swollen and right toe is black at the tip.  Pt educated on triage process and instructed to notify triage RN of any change in status.

## 2019-09-14 NOTE — OR SURGEON
Immediate Post OP Note    PreOp Diagnosis: Right leg ischemia    PostOp Diagnosis: Same    Procedure:  Aortogram  Right leg angiogram; atherectomy and angioplasty    Anesthesiologist/Type of Anesthesia:Madina Paredes RN    Estimated Blood Loss: minimal    Findings: Critical right popliteal stenosis and moderate right SFA stenoses    Complications: none    Flouro time - 43.7min  Contrast 86cc Visipaque  196.23mGy  [id number]    9/14/2019 4:53 PM Sera Peters M.D.

## 2019-09-14 NOTE — DISCHARGE PLANNING
Anticipated Discharge Disposition: TBD    Action: Spoke with pt at bedside and verified address, PCP, phone #, and contacts. Pt lives with spouse in 1 story house and states [place] supports as spouse and 2 sons who live locally. Has AD. Will go home in private car. Has insurance coverage and preferred pharmacy is [facility]. Pt is independent in ADLs other than medication mgmt. Uses cane and walker.     Barriers to Discharge: pending PT/OT eval, medical clearance; pt scheduled for surgery    Plan: reassess after PT/OT eval

## 2019-09-14 NOTE — ED PROVIDER NOTES
ED Provider Note    HPI: Patient is a 79-year-old male who presented to the emergency department September 13, 2019 at 5:48 PM with a chief complaint of right foot pain and discoloration of the great toe.    Patient was recently hospitalized and during that hospitalization he began having a little bit of discomfort in his right foot but did not have any skin discoloration until the last couple of days.  He was noted at dialysis today to have what appeared to be a necrotic area on the pad of his great toe on the right foot.  He has had no fever no chills no cough no nausea vomiting.  The patient is somewhat uncomfortable but declined pain medication.  He denies any trauma to the foot.  Dr. Peters vascular surgeon plans to perform an angiogram and the patient in the a.m.  The patient has known peripheral vascular disease.  No other somatic complaint    Review of Systems: Positive right great toe discoloration and pain negative for fever chills cough nausea vomiting trauma.  Review of systems reviewed with patient, all other systems negative    Past medical/surgical history: BPH COPD end-stage renal disease on dialysis supplement oxygen treatment coronary artery disease bilateral leg pain gout pneumonia high cholesterol chronic respiratory failure with hypoxia leg ulcer atrial flutter CHF hypertension chronic anticoagulation    Medications: Pravachol Xopenex Trelegy Ellipta Prilosec Coumadin Lopressor Flomax Cordarone Demadex amoxicillin Ambien    Allergies: None    Social History: 40-pack-year history smoking quit in 2009 no alcohol use      Physical exam: Constitutional: Pleasant male awake alert  Vital signs: Temperature 98.6 pulse 64 respiration 16 blood pressure 126/40 pulse oximetry 96%   eYES: PERRL, EOMI, Conjunctivae and sclera normal, eyelids normal bilaterally.  Neck: Trachea midline. No cervical masses seen or palpated. Normal range of motion, supple. No meningeal signs elicited.  Cardiac: Regular rate  and rhythm. S1-S2 present. No S3 or S4 present. No murmurs, rubs, or gallops heard. No edema or varicosities were seen.   Lungs: Clear to auscultation with good aeration throughout. No wheezes, rales, or rhonchi heard. Patient's chest wall moved symmetrically with each respiratory effort. Patient was not making use of accessory muscles of respiration in breathing.  Abdomen: Soft nontender to palpation. No rebound or guarding elicited. No organomegaly identified. No pulsatile abdominal masses identified.   Musculoskeletal:  no  pain with palpitation or movement of muscle, bone or joint , no obvious musculoskeletal deformities identified.  Neurologic: alert and awake answers questions appropriately. Moves all four extremities independently, no gross focal abnormalities identified. Normal strength and motor.  Skin: Marketed venous stasis/vascular changes noted below both knees.  On the pad of the great toe on the right foot there is a 1 inch by half inch black area.  Surrounding this the foot is somewhat erythematous.  No other rash or lesion seen, no other palpable dermatologic lesions identified.  Psychiatric: not anxious, delusional, or hallucinating.    Medical decision making: Dr. Peters consulted please see her note    Laboratory studies obtained (please see lab sheet for all results) significant findings included pancytopenia white count 3.9 hemoglobin 9.2 platelet count 132.  I suspect this is secondary to chronic renal failure.  Renal failure is present with a BUN of 24 creatinine 4.21 potassium is 3.4.  Lactic acid somewhat elevated at 2.7    Patient be admitted by the hospitalist service.  He will undergo angiogram in the a.m.  I suspect he has severe peripheral vascular disease due to his hypertension.  Unfortunately this appears to be fairly distal in nature and so I do not know if any sort of intervention such as stenting or bypass will be helpful.    Further care and hospital course per attending  physician summary    Impression ischemic foot  2.)  Chronic renal failure  3.)  Chronic anticoagulate

## 2019-09-14 NOTE — H&P
Hospital Medicine History & Physical Note    Date of Service  9/13/2019    Primary Care Physician  Martha Light M.D.    Consultants  Vascular surgery    Code Status  full    Chief Complaint  Right leg pain     History of Presenting Illness  79 y.o. male who presented 9/13/2019 with past medical history of end-stage renal disease on hemodialysis, systolic heart failure, atrial fibrillation on anticoagulation, COPD, hyperlipidemia, hypertension, peripheral vascular disease, coronary artery disease who presents with right lower extremity pain.  This patient noticed right necrotic big toe that started earlier this morning.  Is also noticed a red swollen foot.  Breakdown of the right big toe significant pain at rest.  Worse with movement.  Throbbing in nature.  Poor peripheral pulses.  Found to have an ischemic left foot and will be admitted to the hospital for further management and surgical intervention.    Review of Systems  Review of Systems   Constitutional: Positive for malaise/fatigue. Negative for chills and fever.   HENT: Negative for congestion, hearing loss and tinnitus.    Eyes: Negative for blurred vision, double vision and discharge.   Respiratory: Negative for cough, hemoptysis and shortness of breath.    Cardiovascular: Positive for leg swelling. Negative for chest pain and palpitations.   Gastrointestinal: Negative for abdominal pain, heartburn, nausea and vomiting.   Genitourinary: Negative for dysuria and flank pain.   Musculoskeletal: Positive for joint pain. Negative for myalgias.   Skin: Positive for rash.   Neurological: Negative for dizziness, sensory change, speech change, focal weakness and weakness.   Endo/Heme/Allergies: Negative for environmental allergies. Does not bruise/bleed easily.   Psychiatric/Behavioral: Negative for depression, hallucinations and substance abuse.       Past Medical History   has a past medical history of Anesthesia, Anticoagulation monitoring, special range,  Aortic stenosis, Arterial leg ulcer ([facility]) (11/8/2018), Atrial flutter ([facility]) (6/12/2019), Basal cell carcinoma of left cheek (3/26/2015), BPH (7/14/2009), Bronchitis (12/25/2018), CAD (coronary artery disease) (2/20/2014), CATARACT, CHF (congestive heart failure), NYHA class II, chronic, systolic ([facility] [place]) E F30 in setting of atrial flutter (6/13/2019), Chronic respiratory failure with hypoxia ([facility]) (5/8/2016), CKD (chronic kidney disease) stage 4, GFR 15-29 ml/min ([facility]) (1/15/2010), COPD (chronic obstructive pulmonary disease) (Former[facility]), Detached retina, Dialysis, EMPHYSEMA, Gout, Heart burn, Heart murmur, High cholesterol, Hypertension, Indigestion, Kidney cyst, Leg pain, bilateral (8/10/2015), On supplemental oxygen therapy, Peripheral vascular disease ([facility]) (8/10/2015), Pneumonia, Primary insomnia (3/22/2018), Proteinuria, PVD (peripheral vascular disease) ([facility] [place]), and Sleep apnea.    Surgical History   has a past surgical history that includes cataract phaco with iol (4/8/08); av fistula creation (2/12/2010); av fistula revision (2/19/2010); av fistulogram (7/23/2010); angioplasty balloon (7/23/2010); av fistulogram (9/17/2010); angioplasty balloon (9/17/2010); vitrectomy posterior (1/18/2011); scleral buckling (1/18/2011); recovery (8/12/2011); vitrectomy posterior (10/11/2011); recovery (7/23/2012); recovery (1/29/2013); recovery (7/2/2013); av fistula thrombolysis (7/2/2013); recovery (12/17/2013); recovery (3/24/2014); recovery (7/29/2014); recovery (3/24/2015); recovery (12/23/2015); gastroscopy with biopsy (8/13/2016); colonoscopy - endo (8/15/2016); endoscopy procedure (3/21/2018); femoral endarterectomy (Left, 1/25/2019); femoral popliteal bypass (Left, 1/25/2019); angiogram (Left, 1/25/2019); other orthopedic surgery (1998); av fistula creation (Right, 2/21/2019); lisa (6/13/2019); cardioversion (6/13/2019); av fistula creation (Right, 8/6/2019); and cath placement (Right, 8/6/2019).     Family History  family  history includes Genitourinary () Problems in his maternal aunt; Hypertension in his brother and mother; Lung Disease in his father; Stroke in his mother.     Social History   reports that he quit smoking about 10 years ago. His smoking use included cigarettes. He has a 40.00 pack-year smoking history. He has never used smokeless tobacco. He reports that he does not drink alcohol or use drugs.    Allergies  No Known Allergies    Medications  Prior to Admission Medications   Prescriptions Last Dose Informant Patient Reported? Taking?   Fluticasone-Umeclidin-Vilant (TRELEGY ELLIPTA) 100-62.5-25 MCG/INH AEROSOL POWDER, BREATH ACTIVATED 9/13/2019 at AM Family Member No No   Sig: Inhale 1 Puff by mouth every day. Rinse mouth after use   amiodarone (CORDARONE) 200 MG Tab 9/13/2019 at AM Family Member Yes No   Sig: Take 1 Tab by mouth 2 Times a Day.   amoxicillin (AMOXIL) 500 MG Cap 9/12/2019 at AM Family Member No No   Sig: Take 1 Cap by mouth every day for 7 days.   calcium acetate (PHOS-LO) 667 MG Cap 9/13/2019 at AM Family Member Yes No   Sig: Take 1,334 mg by mouth 3 times a day, with meals.   levalbuterol (XOPENEX) 1.25 MG/3ML Nebu Soln PRN at PRN Family Member No No   Sig: 3 mL by Nebulization route every four hours as needed for Shortness of Breath.   metoprolol (LOPRESSOR) 50 MG Tab 9/13/2019 at AM Family Member No No   Sig: Take 1 Tab by mouth 2 Times a Day.   omeprazole (PRILOSEC) 40 MG delayed-release capsule 9/13/2019 at AM Family Member Yes No   Sig: Take 40 mg by mouth every morning.   pravastatin (PRAVACHOL) 20 MG Tab 9/12/2019 at PM Family Member Yes No   Sig: Take 20 mg by mouth every evening.   tamsulosin (FLOMAX) 0.4 MG capsule 9/13/2019 at AM Family Member No No   Sig: Take 1 Cap by mouth ONE-HALF HOUR AFTER BREAKFAST.   torsemide (DEMADEX) 10 MG tablet 9/13/2019 at AM Family Member Yes No   Sig: Take 10 mg by mouth every day.   warfarin (COUMADIN) 5 MG Tab 9/12/2019 at PM Family Member Yes Yes    Sig: Take 2.5-5 mg by mouth every day. 2.5 mg (5 mg x 0.5) every Mon, Fri; 5 mg (5 mg x 1) all other days   zolpidem (AMBIEN) 10 MG Tab PRN at PRN Family Member Yes Yes   Sig: Take 10 mg by mouth at bedtime as needed for Sleep.      Facility-Administered Medications: None       Physical Exam  Temp:  [37 °C (98.6 °F)] 37 °C (98.6 °F)  Pulse:  [64] 64  Resp:  [16] 16  BP: (126)/(40) 126/40  SpO2:  [96 %] 96 %    Physical Exam   Constitutional: He is oriented to person, place, and time. He appears well-developed and well-nourished. He appears distressed.   HENT:   Head: Normocephalic and atraumatic.   Eyes: Pupils are equal, round, and reactive to light. Conjunctivae and EOM are normal.   Neck: Normal range of motion. Neck supple. No JVD present.   Cardiovascular: Normal rate, regular rhythm and normal heart sounds.   No murmur heard.  Poor pulses in the right lower extremity    Pulmonary/Chest: Effort normal and breath sounds normal. No respiratory distress. He exhibits no tenderness.   Abdominal: Soft. Bowel sounds are normal. He exhibits no distension. There is no tenderness.   Musculoskeletal: Normal range of motion. He exhibits edema.   Right foot edema, poor peripheral pulses   Neurological: He is alert and oriented to person, place, and time. No cranial nerve deficit. He exhibits normal muscle tone.   Skin: Skin is warm and dry. There is erythema.   Right foot swelling, erythema and necrotic great toe    Psychiatric: He has a normal mood and affect. His behavior is normal. Judgment and thought content normal.   Nursing note and vitals reviewed.      Laboratory:  Recent Labs     09/13/19 1920   WBC 3.9*   RBC 3.08*   HEMOGLOBIN 9.2*   HEMATOCRIT 32.2*   .5*   MCH 29.9   MCHC 28.6*   RDW 72.6*   PLATELETCT 132*   MPV 10.1     Recent Labs     09/13/19 1920   SODIUM 137   POTASSIUM 3.4*   CHLORIDE 95*   CO2 30   GLUCOSE 98   BUN 24*   CREATININE 4.21*   CALCIUM 9.2     Recent Labs     09/13/19 1920    ALTSGPT 7   ASTSGOT 18   ALKPHOSPHAT 111*   TBILIRUBIN 0.7   GLUCOSE 98     Recent Labs     09/11/19  1305   INR 2.79*     No results for input(s): NTPROBNP in the last 72 hours.      No results for input(s): TROPONINT in the last 72 hours.    Urinalysis:    No results found     Imaging:  CTA ABDOMEN PELVIS W & W/O POST PROCESS    (Results Pending)   CT-CTA LOWER EXT WITH & W/O-POST PROCESS RIGHT    (Results Pending)         Assessment/Plan:  I anticipate this patient will require at least two midnights for appropriate medical management, necessitating inpatient admission.    * Ischemic necrosis of foot (HCC)  Assessment & Plan  Admit to surgical floor   Vascular surgery Dr. Peters consulted   Heparin ggt for now  CTA Lower extremity ordered  Npo after midnight  LPS/wound care consult   Hold on abx for now, if patient develops sepsis then consider     Dyslipidemia- (present on admission)  Assessment & Plan  Resume home statin     Pancytopenia (HCC)  Assessment & Plan  Likely due to renal disease   Cont to monitor     Hypokalemia  Assessment & Plan  Mild, cont to montior   Nephrology consultation in the am     Lactic acidosis  Assessment & Plan  Suspect due to right foot ischemia   Recheck labs in am   Does not appear to be due to sepsis     Paroxysmal atrial fibrillation (HCC)- (present on admission)  Assessment & Plan  Resume home amiodarone and BB  Hold patients coumadin, currently on heparin ggt    ESRD (end stage renal disease) on dialysis (HCC)- (present on admission)  Assessment & Plan  Needs nephrology consultation in the am     COPD (chronic obstructive pulmonary disease) (HCC)- (present on admission)  Assessment & Plan  No evidence of acute exacerbation  resp care protocol ordered       BPH (benign prostatic hypertrophy)- (present on admission)  Assessment & Plan  Resume home flomax       VTE prophylaxis: heparin

## 2019-09-14 NOTE — CONSULTS
DATE OF SERVICE:  09/14/2019    REQUESTING PHYSICIAN:  Timbo Loco MD    REASON FOR CONSULTATION:  Management of chronic kidney disease, assessing the   need for dialysis.    HISTORY OF PRESENT ILLNESS:  The patient is a very pleasant 79-year-old   gentleman who is very well known to our service.  He has a history of   end-stage renal disease, undergoes hemodialysis on a Monday, Wednesday, and   Friday.  He is under the care of my associate, Dr. Larson, patient presented   to the hospital last night with right leg pain, was diagnosed with right foot   ischemia.  He is being evaluated for angiogram.  We were called to manage his   kidney disease, assessing the need for dialysis.    Patient has had dialysis yesterday.  He has no chest pain, no shortness of   breath.  He is complaining of right foot pain, however.    No hematuria, no dysuria.    PAST MEDICAL HISTORY:  Significant for:  1.  End-stage renal disease.  2.  Peripheral vascular disease.  3.  Coronary artery disease.    ALLERGIES:  No known drug allergies.    SOCIAL HISTORY:  Patient had a remote history of smoking.  He quit 10 years   ago.    FAMILY HISTORY:  Positive for hypertension.    MEDICATIONS:  His medications were reviewed.    REVIEW OF SYSTEMS:  The patient has no fever, no chills, no nausea, no   vomiting.  He does complain of right foot pain.  All other review of system is   negative except outlined in the history of present illness.    PHYSICAL EXAMINATION:  GENERAL:  Patient is in no apparent distress.  VITAL SIGNS:  Showed blood pressure of 150/45, heart rate was 83,   respiratory rate was 18.  HEENT:  Normocephalic, atraumatic.  Sclerae is anicteric.  Pupils are   reactive.  Nose is normal.  Mucous membrane is moist.  NECK:  No lymphadenopathy, no JVD, no thyroid mass.  CHEST:  Normal.  LUNGS:  Clear to auscultation.  HEART:  S1, S2.  ABDOMEN:  Soft, nontender, no hepatosplenomegaly.  There is no inguinal   lymphadenopathy.  EXTREMITIES:   Patient had right foot swelling and erythema with necrotic great   toe.  NEUROLOGIC:  Patient is alert and oriented x3.  PSYCHIATRIC:  Mood is normal.    LABORATORY DATA:  His recent labs were reviewed.  He also had a CT angiogram   done yesterday, which showed high-grade stenosis in the right superficial   femoral artery.    ASSESSMENT:  1.  End-stage renal disease.  2.  Peripheral vascular disease.  3.  Right foot ischemia.  4.  Anemia.  5.  Uncontrolled hypertension.    PLAN:  1.  There is no acute need for renal replacement therapy.  2.  Daily labs.  3.  Low sodium diet.  4.  Restart the patient's medication.  5.  Erythropoietin.  6.  We will evaluate the patient after the angiogram to see if he needs urgent   dialysis post-IV contrast exposure.  7.  Prognosis is guarded.    Plan discussed in detail with Dr. Loco as well as Dr. Peters from vascular   surgeon.       ____________________________________     FADI NAJJAR, MD FN / DELANO    DD:  09/14/2019 13:21:30  DT:  09/14/2019 13:40:11    D#:  [id number]  Job#:  [id number]

## 2019-09-14 NOTE — ED NOTES
Med rec complete per pt at bedside with S/O   NKDA  Pt is on a 7 day course of Amoxicillin 500mg daily (has had 2 doses total)    Per anticoag note as of yesterday 9/12/19:  Warfarin dose is 2.5 mg (5 mg x 0.5) every Mon, Fri; 5 mg (5 mg x 1) all other days

## 2019-09-14 NOTE — CONSULTS
"        Date of Service  9/13/2019    Reason For Consult  Right foot rest pain and ischemia     Requesting Physician  Juan Eagle MD    Consulting Physician  Sera Peters M.D.    Primary Care Physician  Martha Light M.D.    Chief Complaint  Right foot pain, numbness and ischemia    History of Present Illness  79 y.o. male who presented 9/13/2019 with an ischemic area on the right great toe.  He was recently hospitalized with heart problems and demonstrated right leg rest pain while hospitalized.  He was seen in my office a couple weeks ago and his ischemic symptoms were mistaken for venous problems.  A venous duplex exam was negative for deep venous thrombosis.     I was called by a nurse at the wound clinic on Thursday with concerns of further ischemia and eschar.  A home health nurse instructed the patient to present to the emergency room due to worsening symptoms.     Review of Systems  Review of Systems   Constitutional: Positive for malaise/fatigue. Negative for chills and fever.   HENT: Negative.    Eyes: Negative.    Respiratory: Negative.    Cardiovascular: Negative for palpitations, orthopnea and claudication.   Gastrointestinal: Negative.    Musculoskeletal: Positive for joint pain and myalgias.   Neurological: Positive for sensory change. Negative for dizziness.   Psychiatric/Behavioral: Positive for depression.       Past Medical History  Past Medical History:   Diagnosis Date   • Anesthesia     \"needs more sedation\" (can sometimes hear MD during procedure)    • Anticoagulation monitoring, special range    • Aortic stenosis     mod AS- concern for low flow severe AS with rEFof 30%   • Arterial leg ulcer (HCC) 11/8/2018   • Atrial flutter (HCC) 6/12/2019   • Basal cell carcinoma of left cheek 3/26/2015   • BPH 7/14/2009   • Bronchitis 12/25/2018   • CAD (coronary artery disease) 2/20/2014   • CATARACT     sanjuanita surgery complete   • CHF (congestive heart failure), NYHA class II, chronic, " systolic ([facility]) E F30 in setting of atrial flutter 6/13/2019   • Chronic respiratory failure with hypoxia ([facility]) 5/8/2016   • CKD (chronic kidney disease) stage 4, GFR 15-29 ml/min ([facility]) 1/15/2010    end stage renal disease   • COPD (chronic obstructive pulmonary disease) ([facility])     wears 2.5 L o2 sometimes when he sleeps   • Detached retina    • Dialysis     m,w,f [facility]   • EMPHYSEMA    • Gout    • Heart burn     occas   • Heart murmur     maria esther lee cardiologist   • High cholesterol    • Hypertension    • Indigestion     occas   • Kidney cyst    • Leg pain, bilateral 8/10/2015   • On supplemental oxygen therapy    • Peripheral vascular disease ([facility]) 8/10/2015   • Pneumonia    • Primary insomnia 3/22/2018   • Proteinuria    • PVD (peripheral vascular disease) ([facility])    • Sleep apnea     O2 PER CANULA  AT NIGHT 2l/m       Surgical History  Past Surgical History:   Procedure Laterality Date   • AV FISTULA CREATION Right 8/6/2019    Procedure: CREATION, AV FISTULA -  RIGHT ARM  ,  and Ligation of Left Arm Fistula;  Surgeon: Sera Peters M.D.;  Location: [facility];  Service: General   • CATH PLACEMENT Right 8/6/2019    Procedure: INSERTION, CATHETER - PERMA ,;  Surgeon: Sera Peters M.D.;  Location: [facility];  Service: General   • JUSTIN  6/13/2019    Procedure: ECHOCARDIOGRAM, TRANSESOPHAGEAL;  Surgeon: Harvinder Hoang M.D.;  Location: [facility];  Service: Cardiac   • CARDIOVERSION  6/13/2019    Procedure: CARDIOVERSION;  Surgeon: Harvinder Hoang M.D.;  Location: [facility];  Service: Cardiac   • AV FISTULA CREATION Right 2/21/2019    Procedure: AV FISTULA CREATION - ARM;  Surgeon: David Cason M.D.;  Location: [facility];  Service: General   • FEMORAL ENDARTERECTOMY Left 1/25/2019    Procedure: FEMORAL ENDARTERECTOMY;  Surgeon: David Cason M.D.;  Location: [facility];  Service: General   •  FEMORAL POPLITEAL BYPASS Left 1/25/2019    Procedure: LEFT FEMORAL POPLITEAL POLYTETRAFLUOROETHYLENE ePTFE(PROPATEN VASCULAR GRAFT) BYPASS;  Surgeon: David Cason M.D.;  Location: SURGERY [facility];  Service: General   • ANGIOGRAM Left 1/25/2019    Procedure: LEFT LEG ANGIOGRAM;  Surgeon: David Cason M.D.;  Location: SURGERY [facility];  Service: General   • ENDOSCOPY PROCEDURE  3/21/2018    Procedure: ENDOSCOPY PROCEDURE/UPPER;  Surgeon: Enrique Child D.O.;  Location: SURGERY [facility];  Service: Gastroenterology   • COLONOSCOPY - ENDO  8/15/2016    Procedure: COLONOSCOPY - ENDO;  Surgeon: Mane Whatley M.D.;  Location: ENDOSCOPY [facility];  Service:    • GASTROSCOPY WITH BIOPSY  8/13/2016    Procedure: GASTROSCOPY WITH BIOPSY;  Surgeon: Jorge Leavitt M.D.;  Location: ENDOSCOPY [facility];  Service:    • RECOVERY  12/23/2015    Procedure: VASCULAR CASE-CASON-LEFT ARM FISTULOGRAM WITH ANGIOPLASTY;  Surgeon: Recoveryonly Surgery;  [place] [facility]  [place]:    • RECOVERY  3/24/2015    Performed by [facility] at [place] [facility]   • RECOVERY  7/29/2014    Performed by [facility]Rec[facility] Surgery at SURGERY SAME DAY [facility]   • RECOVERY  3/24/2014    Performed by [facility] at SURGERY [facility]   • RECOVERY  12/17/2013    Performed by [facility] Surgery at SURGERY SAME DAY [facility]   • RECOVERY  7/2/2013    Performed by [facility]overy Surgery at SURGERY SAME DAY [facility]   • AV FISTULA THROMBOLYSIS  7/2/2013    Performed by David Cason M.D. at SURGERY [facility]   • RECOVERY  1/29/2013    Performed by [facility] Surgery at SURGERY SAME DAY [facility]   • RECOVERY  7/23/2012    Performed by SURGERY, IR-RECOVERY at SURGERY SAME DAY [facility]   • VITRECTOMY POSTERIOR  10/11/2011    Performed by NAHUM GE at SURGERY SAME DAY [facility]   • RECOVERY  8/12/2011    Performed by SURGERY,  IR-RECOVERY at SURGERY SAME DAY [facility]   • VITRECTOMY POSTERIOR  1/18/2011    Performed by NAHUM GE at SURGERY SAME DAY [facility]   • SCLERAL BUCKLING  1/18/2011    Performed by NAHUM GE at SURGERY SAME DAY [facility]   • AV FISTULOGRAM  9/17/2010    Performed by ALESHIA MOSCOSO at SURGERY [facility]   • ANGIOPLASTY BALLOON  9/17/2010    Performed by ALESHIA MOSCOSO at SURGERY [facility]   • AV FISTULOGRAM  7/23/2010    Performed by ALESHIA MOSCOSO at SURGERY [facility]   • ANGIOPLASTY BALLOON  7/23/2010    Performed by ALESHIA MOSCOSO at SURGERY [facility]   • AV FISTULA REVISION  2/19/2010    Performed by WILLIE HATCH at SURGERY [facility] ORS   • AV FISTULA CREATION  2/12/2010    Performed by ALESHIA MOSCOSO at SURGERY [facility]   • CATARACT PHACO WITH IOL  4/8/08    Performed by STACEY PHILLIPS at SURGERY SAME DAY [facility]   • OTHER ORTHOPEDIC SURGERY  1998    right toe for facititis        Family History  Family History   Problem Relation Age of Onset   • Stroke Mother    • Hypertension Mother    • Lung Disease Father         Emphysema, resp failure   • Genitourinary () Problems Maternal Aunt         hematuria   • Hypertension Brother         Social History  Social History     Socioeconomic History   • Marital status:      Spouse name: Not on file   • Number of children: Not on file   • Years of education: Not on file   • Highest education level: Not on file   Occupational History   • Not on file   Social Needs   • Financial resource strain: Not on file   • Food insecurity:     Worry: Not on file     Inability: Not on file   • Transportation needs:     Medical: Not on file     Non-medical: Not on file   Tobacco Use   • Smoking status: Former Smoker     Packs/day: 1.00     Years: 40.00     Pack years: 40.00     Types: Cigarettes     Last attempt to quit: 1/1/2009     Years since quitting: 10.7   • Smokeless tobacco: Never  Used   • Tobacco comment: 1 pk a day for 35 yrs, QUIT JAN 1 2010   Substance and Sexual Activity   • Alcohol use: No     Alcohol/week: 0.0 oz   • Drug use: No   • Sexual activity: Never     Partners: Female     Comment: , two sons, 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   Lifestyle   • Physical activity:     Days per week: Not on file     Minutes per session: Not on file   • Stress: Not on file   Relationships   • Social connections:     Talks on phone: Not on file     Gets together: Not on file     Attends [employer] service: Not on file     Active member of club or organization: Not on file     Attends meetings of clubs or organizations: Not on file     Relationship status: Not on file   • Intimate partner violence:     Fear of current or ex partner: Not on file     Emotionally abused: Not on file     Physically abused: Not on file     Forced sexual activity: Not on file   Other Topics Concern   • Not on file   Social History Narrative   • Not on file       Medications  Prior to Admission Medications   Prescriptions Last Dose Informant Patient Reported? Taking?   Fluticasone-Umeclidin-Vilant (TRELEGY ELLIPTA) 100-62.5-25 MCG/INH AEROSOL POWDER, BREATH ACTIVATED 9/13/2019 at AM Family Member No No   Sig: Inhale 1 Puff by mouth every day. Rinse mouth after use   amiodarone (CORDARONE) 200 MG Tab 9/13/2019 at AM Family Member Yes No   Sig: Take 1 Tab by mouth 2 Times a Day.   amoxicillin (AMOXIL) 500 MG Cap 9/12/2019 at AM Family Member No No   Sig: Take 1 Cap by mouth every day for 7 days.   calcium acetate (PHOS-LO) 667 MG Cap 9/13/2019 at AM Family Member Yes No   Sig: Take 1,334 mg by mouth 3 times a day, with meals.   levalbuterol (XOPENEX) 1.25 MG/3ML Nebu Soln PRN at PRN Family Member No No   Sig: 3 mL by Nebulization route every four hours as needed for Shortness of Breath.   metoprolol (LOPRESSOR) 50 MG Tab 9/13/2019 at AM Family Member No No   Sig: Take 1 Tab by mouth 2 Times a Day.   omeprazole (PRILOSEC) 40 MG  delayed-release capsule 9/13/2019 at AM Family Member Yes No   Sig: Take 40 mg by mouth every morning.   pravastatin (PRAVACHOL) 20 MG Tab 9/12/2019 at PM Family Member Yes No   Sig: Take 20 mg by mouth every evening.   tamsulosin (FLOMAX) 0.4 MG capsule 9/13/2019 at AM Family Member No No   Sig: Take 1 Cap by mouth ONE-HALF HOUR AFTER BREAKFAST.   torsemide (DEMADEX) 10 MG tablet 9/13/2019 at AM Family Member Yes No   Sig: Take 10 mg by mouth every day.   warfarin (COUMADIN) 5 MG Tab 9/12/2019 at PM Family Member Yes Yes   Sig: Take 2.5-5 mg by mouth every day. 2.5 mg (5 mg x 0.5) every Mon, Fri; 5 mg (5 mg x 1) all other days   zolpidem (AMBIEN) 10 MG Tab PRN at PRN Family Member Yes Yes   Sig: Take 10 mg by mouth at bedtime as needed for Sleep.      Facility-Administered Medications: None       Current Facility-Administered Medications   Medication Dose Route Frequency Provider Last Rate Last Dose   • [START ON 9/14/2019] senna-docusate (PERICOLACE or SENOKOT S) 8.6-50 MG per tablet 2 Tab  2 Tab Oral BID Anny العلي M.D.        And   • polyethylene glycol/lytes (MIRALAX) PACKET 1 Packet  1 Packet Oral QDAY PRN Anny العلي M.D.        And   • magnesium hydroxide (MILK OF MAGNESIA) suspension 30 mL  30 mL Oral QDAY PRN Anny العلي M.D.        And   • bisacodyl (DULCOLAX) suppository 10 mg  10 mg Rectal QDAY PRN Anny العلي M.D.       • ondansetron (ZOFRAN) syringe/vial injection 4 mg  4 mg Intravenous Q4HRS PRN Anny العلي M.D.       • ondansetron (ZOFRAN ODT) dispertab 4 mg  4 mg Oral Q4HRS PRN Anny العلي M.D.       • Pharmacy Consult Request ...Pain Management Review 1 Each  1 Each Other PHARMACY TO DOSE Anny العلي M.D.        And   • oxyCODONE immediate-release (ROXICODONE) tablet 5 mg  5 mg Oral Q3HRS PRN Anny العلي M.D.        And   • oxyCODONE immediate-release (ROXICODONE) tablet 10 mg  10 mg Oral Q3HRS PRN Anny العلي M.D.        And   • morphine (pf)  4 mg/ml injection 4 mg  4 mg Intravenous Q3HRS PRN Anny العلي M.D.       • amiodarone (CORDARONE) tablet 200 mg  200 mg Oral BID Anny العلي M.D.       • metoprolol (LOPRESSOR) tablet 50 mg  50 mg Oral BID Anny العلي M.D.       • [START ON 9/14/2019] omeprazole (PRILOSEC) capsule 40 mg  40 mg Oral QAM Anny العيل M.D.       • pravastatin (PRAVACHOL) tablet 20 mg  20 mg Oral Nightly Anny العلي M.D.       • [START ON 9/14/2019] tamsulosin (FLOMAX) capsule 0.4 mg  0.4 mg Oral AFTER BREAKFAST Anny العلي M.D.       • [START ON 9/14/2019] torsemide (DEMADEX) tablet 10 mg  10 mg Oral DAILY Anny العلي M.D.       • zolpidem (AMBIEN) tablet 10 mg  10 mg Oral HS PRN Anny العلي M.D.       • Respiratory Care per Protocol   Nebulization Continuous RT Anny العلي M.D.       • heparin injection 6,000 Units  6,000 Units Intravenous Once Sera Peters M.D.        And   • heparin injection 3,200 Units  3,200 Units Intravenous PRN Sera Peters M.D.        And   • heparin infusion 25,000 units in 500 mL 0.45% NACL   Intravenous Continuous Sera Peters M.D.       • [START ON 9/14/2019] umeclidinium-vilanterol (ANORO ELLIPTA) inhaler 1 Puff  1 Puff Inhalation QDAILY (RT) Anny العلي M.D.        And   • [START ON 9/14/2019] fluticasone (FLOVENT HFA) 44 MCG/ACT inhaler 88 mcg  2 Puff Inhalation QDAILY (RT) Anny العلي M.D.           Allergies  No Known Allergies      Physical Exam  Temp:  [37 °C (98.6 °F)] 37 °C (98.6 °F)  Pulse:  [61-64] 61  Resp:  [16] 16  BP: (126-139)/(40-54) 139/54  SpO2:  [96 %] 96 %    Pulse/Extremity Exam:    Femorals:        Right: monophasic       Left palpable  Popliteals:       Right absent       Left absent  Pedal Pulses:       Right DP absent       Right PT absent       Left DP biphasic       Left PT biphasic    General appearance: NAD, conversing appropriately, terrible dentition  Psych: Normal affect, mood,  judgement  Neuro: CN II-XII grossly intact. Sensation diminished in the right foot  Neck: full range of motion  Lungs: No inspiratory stridor or wheezing; diminished breat sounds  CV: RRR  Abdomen: Soft, NT/ND  Skin: No rashes  Musculoskeletal:  Right foot with diminished motor function and ischemic change in the right great toe.     Labs Reviewed Today:  Recent Labs     09/13/19 1920   WBC 3.9*   RBC 3.08*   HEMOGLOBIN 9.2*   HEMATOCRIT 32.2*   .5*   MCH 29.9   MCHC 28.6*   RDW 72.6*   PLATELETCT 132*   MPV 10.1     Recent Labs     09/13/19 1920   SODIUM 137   POTASSIUM 3.4*   CHLORIDE 95*   CO2 30   GLUCOSE 98   BUN 24*   CREATININE 4.21*   CALCIUM 9.2     Recent Labs     09/13/19 1920   ALTSGPT 7   ASTSGOT 18   ALKPHOSPHAT 111*   TBILIRUBIN 0.7   GLUCOSE 98       Imaging Reviewed Today:  CTA is pending at the time of this report.    Assessment/Plan & Medical Decision-Making    Right femoral pulse is diminished and I suspect the right SFA and popliteal artery are occluded after early August admission.  I await the result of the CTA.  Mr. Lisa has a palpable femoral pulse and therefore, I believe his iliac arteries remain patent.    Due to the palpable pulse, but given the significance of ischemia, I suspect the residual lower extremity may be thrombosed.  The decision is whether to proceed with thrombolysis, surgery with bypass or thrombectomy    Sera Peters MD  Vascular Surgeon  [facility]  Office: [phone]

## 2019-09-14 NOTE — ED NOTES
Received report from Daren BYRD    Patient resting in bed, wife at bedside.    Patient has PVD, wounds on Right foot and hand, edematous, warm, and red.  ERP aware of poor vasculature.

## 2019-09-14 NOTE — PROGRESS NOTES
This RN called pharmacy to confirm if dose of 6,000 units of heparin bolus was appropriate for patient as patient is 67.5inches. Okay to use 68 inch line on heparin protocol per pharmacy.

## 2019-09-14 NOTE — PROGRESS NOTES
Called and spoke with alex in IR ([id number]) he confirmed that Pt Heparin Drip was stopped during procedure.  I cancelled PTT scheduled  And updated lab [id number] in regards to order

## 2019-09-14 NOTE — PROGRESS NOTES
Patient arrived onto unit with patient transport. On room air and in no acute distress.    2 RN skin check complete:  - necrotic R big toe  - bilateral heels dry and peeling  - R hand fingers red, patient states that he burned them at home  - wound to back of R hand, dressing in place, patient states that he is seeing wound care for the non-healing wound  - skin on sacrum and back intact  - bilateral feet are red with +3 pitting edema  - all other bony prominences intact  - patient turning self from side to side

## 2019-09-14 NOTE — PROGRESS NOTES
Patient down to CT with patient transport. This RN got a call from CT room saying that patient unable to have CTA with contrast due to low GFR. CT representative stated that patient would have to have a dialysis session done within 24 hours of the contrast being injected. Patient had just been to dialysis this AM.

## 2019-09-14 NOTE — PROGRESS NOTES
LIMB PRESERVATION SERVICE - RN NOTE      Reason for LPS Consultation: ***    Past medical history, medicines, allergies, family history, social history,    reviewed    History of Present Illness: Patient is a 79 y.o. male with past medical history that includes *** diabetes,  ***, admitted 9/13/2019 for Ischemic foot pain at rest (HCC).  An LPS consult has been requested for evaluation of ***.  This wound started *** (date of onset), as***.  He has been treating the wound with ***.      Patient was diagnosed with diabetes approximately *** years ago, and currently manages with ***.  Checks blood sugars *** times per day and reports that these average ***. Has *** had diabetes education before.  Reports has neuropathy.  Has/has not*** had any previous foot surgeries.  Does/does not*** have orthotics from *** that are ***years old.  Current occupation ***.       Dr. Peters has been consulted for this case.         DIAGNOSTICS this admission    Xray:  ***   MRI: ***             CT: Right LE   Impression       1.  Diffuse atherosclerotic disease.  2.  LEFT femoropopliteal graft is patent.  Short segment occlusion of LEFT popliteal artery just distal to graft anastomosis with very poor enhancement of the popliteal artery at no apparent enhancement of the calf arteries in the LEFT lower extremity.  3.  Moderate to high-grade stenosis distal RIGHT superficial femoral artery with three-vessel runoff to the RIGHT ankle.  4.  Subcutaneous edema, worst at the lateral aspect RIGHT lower extremity.                  Vascular: ***   Other: ***               Labs                                      ESR:                         CRP:              A1c: ***    PHYSICAL EXAMINATION:   General Appearance:  Well developed, well nourished, in no acute distress  ***  Vascular Assessment:  ***  Sensory Assessment  Monofilament testing  ***  Wound Assessment:  ***   Skin Risk/Octavio   Sensory Perception: Slightly Limited  Moisture:  Rarely Moist  Activity: Walks Occasionally  Mobility: Slightly Limited  Nutrition: Adequate  Friction and Shear: No Apparent Problem  Total Score: 19  Skin Breakdown Risk: 18-23 Minimal Risk for Skin Breakdown    Sensory Interventions  Bed Types: Pressure Redistribution Mattress (Atmosair)  Skin Preventative Measures: Pillows in Use for Support / Positioning           Patient Turns / Repositioning: Patient Turns Self from Side to Side  Patient is Receiving Nutrition: Nothing by Mouth     Vitamin Therapy in Use: No                      Wound 09/12/19 Arterial Ulcer Toe (Comment which one) R distal hallux dry necrotic area, arterial (Active)       Wound Skin Tear Hand R dorsal hand skin tear (Active)                     Procedures:     Debridement :  Scalpel and forceps used to debride wound bed and periwound callus. Entire surface of wound, ***cm2, debrided, removing non viable tissue.  Periwound callus, ~ ***cm2, debrided to skin level, removing hyperkeratinized tissue   Cleansed with:  Normal Saline                                                                        Periwound protected with: Skin prep   Primary dressing:   Secondary Dressing:   Other:         Patient Education:            PLAN    Wound Care:   ***    Offloading  ***    Weightbearing Status  ***    Labs  ***    Imaging  ***    Infection Management  Microbiology ***  Antibiotics ***        Referrals   Vascular: ***   Ortho: ***   Infectious diseases: ***   Diabetes Education: ***   Ortho tech: ***   PT/OT: ***            Professional collaboration: ***

## 2019-09-14 NOTE — ED NOTES
Pt ambulatory to room with steady gait. Placed in gown, in gurney, call light in reach, educated on plan for care. Pt assessed-- speaking in full sentences at this time.

## 2019-09-14 NOTE — PROGRESS NOTES
This RN paged Dr. Doran to inform of /34. Patient asymptomatic. Dr. Doran instructed this RN to page her again if patient's diastolic BP is in the 30's and to hold further doses of metoprolol unless patient is hypertensive.

## 2019-09-14 NOTE — CARE PLAN
Problem: Safety  Goal: Will remain free from falls  Outcome: PROGRESSING AS EXPECTED  Intervention: Implement fall precautions  Flowsheets  Taken 9/14/2019 0231  Bed Alarm: Alarm Not On  Taken 9/14/2019 0200  Environmental Precautions: Treaded Slipper Socks on Patient;Transferred to Stronger Side;Personal Belongings, Wastebasket, Call Bell etc. in Easy Reach;Bed in Low Position  Note:   Patient is a low fall risk. Patient educated to call for assistance. Call light left within reach of patient.       Problem: Infection  Goal: Will remain free from infection  Outcome: PROGRESSING AS EXPECTED  Intervention: Implement standard precautions and perform hand washing before and after patient contact  Note:   Hand hygiene performed prior to and after entering pt room. Gloves worn during patient interactions.

## 2019-09-14 NOTE — PROGRESS NOTES
IR Procedure Note:    Dr. Peters consented patient prior to procedure; all questions answered.    Site confirmed with imaging guidance by patient, physician, RT, and RN.     Dr. Peters completed right lower extremity angiogram with JetStream and balloon angioplasty of right SFA and right popliteal artery.  The patient tolerated the procedure well; ETCo2 range 21-29, with consistent waveform during the procedure.      Tegaderm, gauze and angioSeal applied to left groin, CDI and soft.  Patient alert, oriented and verbally appropriate post procedure, patient remained hemodynamically stable during procedure and transport, see flow sheet for vital signs.  Report given to ROCHELLE Leiva.  RN transported patient to T435 with SaO2 monitor @ 95 % on oxygen via NC on 2 lpm.     Sedation End Time:1645  Angioseal Deployment time:1644

## 2019-09-14 NOTE — RESPIRATORY CARE
COPD EDUCATION by COPD CLINICAL EDUCATOR  9/14/2019  at  9:47 AM by Lucia Orosco     Patient interviewed by COPD education team.  Patient unable to participate in full program.  Short intervention has been conducted.  A comprehensive packet including information about COPD, spacer instruct, home oxygen safety and action plan.

## 2019-09-14 NOTE — ASSESSMENT & PLAN NOTE
Vascular surgery Dr. Peters consulted appreciate rec.   Heparin ggt for now  CTA Lower extremity: LEFT femoropopliteal graft is patent.  Short segment occlusion of LEFT popliteal artery just distal to graft anastomosis with very poor enhancement of the popliteal artery at no apparent enhancement of the calf arteries in the LEFT lower extremity.  3.  Moderate to high-grade stenosis distal RIGHT superficial femoral artery with three-vessel runoff to the RIGHT ankle.  LPS/wound care consult   Hold on abx for now, if patient develops sepsis then consider

## 2019-09-14 NOTE — DIETARY
Nutrition Services: Brief Update    Consult received for DM diet ed.   Per chart review, pt has no hx of diabetes, no new diagnosis of DM.  No A1c value run to assess. BS during admit have been 93 and 98.  Please consult RD for diet education PRN.   Consider running A1c to trend pt's BS.    RD available PRN.

## 2019-09-14 NOTE — PROGRESS NOTES
[facility] Medicine Daily Progress Note    Date of Service  9/14/2019    Chief Complaint  79 y.o. male admitted 9/13/2019 with right leg pain.     Hospital Course    This is a 80 y/o M with past medical history of end-stage renal disease on hemodialysis, systolic heart failure, atrial fibrillation on anticoagulation, COPD, hyperlipidemia, hypertension, peripheral vascular disease, coronary artery disease who was admitted on 9/13/19 after presenting to ER  complaining of right lower extremity pain. Found to have an ischemic left foot and has been  admitted to the hospital for further management and surgical intervention. Vascular surgery has been consulted. Also nephrology consulted regarding his ESRD.      Interval Problem Update  Pt seen and examined, afebrile, no acute events overnight, still with right lower extremity pain. Vascular surgery following appreciate rec.  Nephrology consulted regarding his ESRD appreciate rec.     Consultants/Specialty  Vascular surgery  Nephrology     Code Status  Full Code     Disposition  TBD     Review of Systems  Review of Systems   Constitutional: Positive for malaise/fatigue. Negative for chills and fever.   HENT: Negative for congestion, ear pain, hearing loss and tinnitus.    Eyes: Negative for blurred vision, double vision and discharge.   Respiratory: Negative for cough, hemoptysis and shortness of breath.    Cardiovascular: Positive for leg swelling. Negative for chest pain and palpitations.   Gastrointestinal: Negative for abdominal pain, heartburn, nausea and vomiting.   Genitourinary: Negative for dysuria and flank pain.   Musculoskeletal: Positive for joint pain. Negative for myalgias.   Skin: Positive for rash.   Neurological: Negative for dizziness, sensory change, speech change, focal weakness and weakness.   Endo/Heme/Allergies: Negative for environmental allergies. Does not bruise/bleed easily.   Psychiatric/Behavioral: Negative for depression, hallucinations and  substance abuse.   All other systems reviewed and are negative.       Physical Exam  Temp:  [35.9 °C (96.7 °F)-37 °C (98.6 °F)] 35.9 °C (96.7 °F)  Pulse:  [52-65] 60  Resp:  [16-18] 18  BP: (118-154)/(34-69) 150/45  SpO2:  [95 %-98 %] 96 %    Physical Exam   Constitutional: He is oriented to person, place, and time. He appears well-developed and well-nourished.   HENT:   Head: Normocephalic and atraumatic.   Eyes: Pupils are equal, round, and reactive to light. Conjunctivae and EOM are normal. No scleral icterus.   Neck: Normal range of motion. Neck supple. No JVD present.   Cardiovascular: Normal rate, regular rhythm and normal heart sounds.   No murmur heard.  Poor pulses in the right lower extremity    Pulmonary/Chest: Effort normal and breath sounds normal. No stridor. No respiratory distress. He exhibits no tenderness.   Abdominal: Soft. Bowel sounds are normal. He exhibits no distension. There is no tenderness. There is no rebound.   Musculoskeletal: Normal range of motion. He exhibits edema.   Right foot edema, poor peripheral pulses   Neurological: He is alert and oriented to person, place, and time. No cranial nerve deficit. He exhibits normal muscle tone.   Skin: Skin is warm and dry. There is erythema.   Right foot swelling, erythema and necrotic great toe    Psychiatric: He has a normal mood and affect. His behavior is normal. Judgment and thought content normal.   Nursing note and vitals reviewed.      Fluids    Intake/Output Summary (Last 24 hours) at 9/14/2019 1229  Last data filed at 9/14/2019 0851  Gross per 24 hour   Intake 221.6 ml   Output --   Net 221.6 ml       Laboratory  Recent Labs     09/13/19 1920 09/14/19  0558   WBC 3.9* 3.4*   RBC 3.08* 2.90*   HEMOGLOBIN 9.2* 8.7*   HEMATOCRIT 32.2* 30.2*   .5* 104.1*   MCH 29.9 30.0   MCHC 28.6* 28.8*   RDW 72.6* 72.2*   PLATELETCT 132* 128*   MPV 10.1 9.8     Recent Labs     09/13/19 1920 09/14/19  0558   SODIUM 137 137   POTASSIUM 3.4* 3.9    CHLORIDE 95* 97   CO2 30 30   GLUCOSE 98 93   BUN 24* 35*   CREATININE 4.21* 5.36*   CALCIUM 9.2 9.2     Recent Labs     09/11/19  1305 09/13/19  2244 09/14/19  0558   APTT  --  39.4* >240.0*   INR 2.79* 2.28*  --                Imaging  CT-CTA LOWER EXT WITH & W/O-POST PROCESS RIGHT   Final Result      1.  Diffuse atherosclerotic disease.   2.  LEFT femoropopliteal graft is patent.  Short segment occlusion of LEFT popliteal artery just distal to graft anastomosis with very poor enhancement of the popliteal artery at no apparent enhancement of the calf arteries in the LEFT lower extremity.   3.  Moderate to high-grade stenosis distal RIGHT superficial femoral artery with three-vessel runoff to the RIGHT ankle.   4.  Subcutaneous edema, worst at the lateral aspect RIGHT lower extremity.      IR-EXTREMITY ANGIOGRAM-UNILATERAL RIGHT    (Results Pending)        Assessment/Plan  * Ischemic necrosis of foot (HCC)  Assessment & Plan  Vascular surgery Dr. Peters consulted appreciate rec.   Heparin ggt for now  CTA Lower extremity: LEFT femoropopliteal graft is patent.  Short segment occlusion of LEFT popliteal artery just distal to graft anastomosis with very poor enhancement of the popliteal artery at no apparent enhancement of the calf arteries in the LEFT lower extremity.  3.  Moderate to high-grade stenosis distal RIGHT superficial femoral artery with three-vessel runoff to the RIGHT ankle.  LPS/wound care consult   Hold on abx for now, if patient develops sepsis then consider     Dyslipidemia- (present on admission)  Assessment & Plan  Resume home statin     Pancytopenia (HCC)  Assessment & Plan  Likely due to renal disease   Cont to monitor     Hypokalemia  Assessment & Plan  Mild, cont to montior   Nephrology consultation in the am     Lactic acidosis  Assessment & Plan  Suspect due to right foot ischemia   Recheck labs in am   Does not appear to be due to sepsis     Paroxysmal atrial fibrillation (HCC)- (present  on admission)  Assessment & Plan  Resume home amiodarone and BB  Hold patients coumadin, currently on heparin ggt    ESRD (end stage renal disease) on dialysis ([facility])- (present on admission)  Assessment & Plan  Nephrology consulted appreciate rec.     COPD (chronic obstructive pulmonary disease) ([facility])- (present on admission)  Assessment & Plan  No evidence of acute exacerbation  resp care protocol ordered       BPH (benign prostatic hypertrophy)- (present on admission)  Assessment & Plan  Resume home flomax        VTE prophylaxis: heparin

## 2019-09-14 NOTE — DISCHARGE PLANNING
Care Transition Team Assessment    Information Source  Orientation : Oriented x 4  Information Given By: Patient  Informant's Name: Ronny Castelan  Who is responsible for making decisions for patient? : Patient    Readmission Evaluation  Is this a readmission?: No    Elopement Risk  Legal Hold: No  Ambulatory or Self Mobile in Wheelchair: Yes  Disoriented: No  Psychiatric Symptoms: None  History of Wandering: No  Elopement this Admit: No  Vocalizing Wanting to Leave: No  Displays Behaviors, Body Language Wanting to Leave: No-Not at Risk for Elopement  Elopement Risk: Not at Risk for Elopement    Interdisciplinary Discharge Planning  Does Admitting Nurse Feel This Could be a Complex Discharge?: No  Primary Care Physician: Martha Young  Lives with - Patient's Self Care Capacity: Spouse  Patient or legal guardian wants to designate a caregiver (see row info): No  Support Systems: Family Member(s)  Housing / Facility: [address]  Do You Take your Prescribed Medications Regularly: Yes  Able to Return to Previous ADL's: Yes  Mobility Issues: Yes  Prior Services: None  Patient Expects to be Discharged to:: home  Assistance Needed: No  Durable Medical Equipment: Walker    Discharge Preparedness  What is your plan after discharge?: Home with help  What are your discharge supports?: Child, Spouse  Prior Functional Level: Independent with Activities of Daily Living, Needs Assist with Medication Management, Uses Cane, Uses Walker  Difficulity with IADLs: Managing medication    Functional Assesment  Prior Functional Level: Independent with Activities of Daily Living, Needs Assist with Medication Management, Uses Cane, Uses Walker    Finances  Financial Barriers to Discharge: No  Prescription Coverage: Yes    Vision / Hearing Impairment  Vision Impairment : Yes  Right Eye Vision: Wears Glasses, Impaired  Left Eye Vision: Wears Glasses, Impaired  Hearing Impairment : No    Values / Beliefs / Concerns  Values / Beliefs  Concerns : No    Advance Directive  Advance Directive?: DPOA for Health Care    Domestic Abuse  Have you ever been the victim of abuse or violence?: No  Physical Abuse or Sexual Abuse: No  Verbal Abuse or Emotional Abuse: No  Possible Abuse Reported to:: Not Applicable              Anticipated Discharge Information  Anticipated discharge disposition: Discharge needs currently unknown

## 2019-09-15 VITALS
TEMPERATURE: 97.4 F | OXYGEN SATURATION: 97 % | RESPIRATION RATE: 16 BRPM | SYSTOLIC BLOOD PRESSURE: 149 MMHG | HEART RATE: 71 BPM | DIASTOLIC BLOOD PRESSURE: 65 MMHG | HEIGHT: 68 IN | WEIGHT: 159.39 LBS | BODY MASS INDEX: 24.16 KG/M2

## 2019-09-15 PROCEDURE — 99239 HOSP IP/OBS DSCHRG MGMT >30: CPT | Performed by: INTERNAL MEDICINE

## 2019-09-15 PROCEDURE — 94760 N-INVAS EAR/PLS OXIMETRY 1: CPT

## 2019-09-15 PROCEDURE — 700102 HCHG RX REV CODE 250 W/ 637 OVERRIDE(OP): Performed by: HOSPITALIST

## 2019-09-15 PROCEDURE — 700101 HCHG RX REV CODE 250: Performed by: INTERNAL MEDICINE

## 2019-09-15 PROCEDURE — 99232 SBSQ HOSP IP/OBS MODERATE 35: CPT | Performed by: INTERNAL MEDICINE

## 2019-09-15 PROCEDURE — A9270 NON-COVERED ITEM OR SERVICE: HCPCS | Performed by: HOSPITALIST

## 2019-09-15 RX ORDER — ALBUTEROL SULFATE 90 UG/1
2 AEROSOL, METERED RESPIRATORY (INHALATION) EVERY 4 HOURS PRN
Status: DISCONTINUED | OUTPATIENT
Start: 2019-09-15 | End: 2019-09-15 | Stop reason: HOSPADM

## 2019-09-15 RX ORDER — LEVALBUTEROL INHALATION SOLUTION 1.25 MG/3ML
1.25 SOLUTION RESPIRATORY (INHALATION) EVERY 4 HOURS PRN
Status: DISCONTINUED | OUTPATIENT
Start: 2019-09-15 | End: 2019-09-15 | Stop reason: HOSPADM

## 2019-09-15 RX ORDER — GUAIFENESIN/DEXTROMETHORPHAN 100-10MG/5
5 SYRUP ORAL EVERY 6 HOURS PRN
Status: DISCONTINUED | OUTPATIENT
Start: 2019-09-15 | End: 2019-09-15 | Stop reason: HOSPADM

## 2019-09-15 RX ORDER — ALBUTEROL SULFATE 90 UG/1
2 AEROSOL, METERED RESPIRATORY (INHALATION) EVERY 4 HOURS PRN
Qty: 8.5 G | Refills: 0 | Status: SHIPPED
Start: 2019-09-15 | End: 2019-12-17

## 2019-09-15 RX ADMIN — LEVALBUTEROL 1.25 MG: 1.25 SOLUTION RESPIRATORY (INHALATION) at 12:18

## 2019-09-15 RX ADMIN — TAMSULOSIN HYDROCHLORIDE 0.4 MG: 0.4 CAPSULE ORAL at 07:49

## 2019-09-15 RX ADMIN — METOPROLOL TARTRATE 50 MG: 50 TABLET ORAL at 05:39

## 2019-09-15 RX ADMIN — FLUTICASONE PROPIONATE 88 MCG: 44 AEROSOL, METERED RESPIRATORY (INHALATION) at 07:49

## 2019-09-15 RX ADMIN — ALBUTEROL SULFATE 2 PUFF: 90 AEROSOL, METERED RESPIRATORY (INHALATION) at 08:00

## 2019-09-15 RX ADMIN — AMIODARONE HYDROCHLORIDE 200 MG: 200 TABLET ORAL at 05:39

## 2019-09-15 RX ADMIN — TORSEMIDE 10 MG: 5 TABLET ORAL at 05:39

## 2019-09-15 RX ADMIN — ALBUTEROL SULFATE 2 PUFF: 90 AEROSOL, METERED RESPIRATORY (INHALATION) at 04:04

## 2019-09-15 RX ADMIN — OMEPRAZOLE 40 MG: 20 CAPSULE, DELAYED RELEASE ORAL at 05:39

## 2019-09-15 ASSESSMENT — ENCOUNTER SYMPTOMS
NAUSEA: 0
FEVER: 0
COUGH: 0
VOMITING: 0
CHILLS: 0
SHORTNESS OF BREATH: 0

## 2019-09-15 NOTE — THERAPY
PT consult received. Pt currently being DC'd. RN reports pt has been ambulating with FWW without difficulty, no acute PT needs. Please re-order if needs or POC change.

## 2019-09-15 NOTE — PROGRESS NOTES
Received report from RN  Pt A&O X3, amb with assist  VSS  No c/o CP  Pt c/o pain; pain meds given  Pt developed a cough and prn med ordered  Will continue to order

## 2019-09-15 NOTE — DISCHARGE SUMMARY
Discharge Summary    CHIEF COMPLAINT ON ADMISSION  Chief Complaint   Patient presents with   • Sent by MD   • Wound Check       Reason for Admission  right foot pain, black toe     Admission Date  9/13/2019    CODE STATUS  Full Code    HPI & HOSPITAL COURSE  This is a 78 y/o M with past medical history of end-stage renal disease on hemodialysis, systolic heart failure, atrial fibrillation on anticoagulation, COPD, hyperlipidemia, hypertension, peripheral vascular disease, coronary artery disease who was admitted on 9/13/19 after presenting to ER  complaining of right lower extremity pain. Found to have an ischemic left foot and has been  admitted to the hospital for further management and surgical intervention. Vascular surgery has been consulted. Also nephrology consulted regarding his ESRD.   Patient had right leg angiogram, atherectomy and angioplasty done by vascular surgery on 9/14/19.  Patient tolerated procedure well, and states his leg feels better, per vascular surgery stand point can be discharged home.   Patient has dialysis session tomorrow as outpatient.  Patient will be discharged home today       The patient met 2-midnight criteria for an inpatient stay at the time of discharge.    Discharge Date  9/15/19    FOLLOW UP ITEMS POST DISCHARGE  Vascular surgery  Nephrology     DISCHARGE DIAGNOSES  Principal Problem:    Ischemic necrosis of foot (HCC) POA: Unknown  Active Problems:    Dyslipidemia POA: Yes    Paroxysmal atrial fibrillation (HCC) POA: Yes    Lactic acidosis POA: Unknown    Hypokalemia POA: Unknown    Pancytopenia (HCC) POA: Unknown    BPH (benign prostatic hypertrophy) POA: Yes      Overview: ICD-10 transition    COPD (chronic obstructive pulmonary disease) (HCC) POA: Yes    ESRD (end stage renal disease) on dialysis (HCC) POA: Yes  Resolved Problems:    * No resolved hospital problems. *      FOLLOW UP  Future Appointments   Date Time Provider Department Center   9/17/2019  1:20 PM Martha  [facility]   9/19/2019  2:00 PM SUPA Mercedes [facility] [address]   9/24/2019 10:15 AM SUPA Mercedes [address]   9/26/2019 10:40 AM Sunny Crum M.D. [facility] None   10/1/2019 11:00 AM EDU [facility] [address]   10/8/2019 11:00 AM EDU [facility] [address] [address]   10/10/2019  1:40 PM ILAN Jiang [facility] None   10/15/2019  1:30 PM EDU [facility] [address]   10/23/2019 12:00 PM [facility] EXAM 3 [facility] [place]   10/23/2019 12:30 PM [facility] CATH LAB 3 CLOT None   11/5/2019  2:45 PM James Mendoza M.D. [facility] None     Sera Peters M.D.  [address] [address]  [phone]    Schedule an appointment as soon as possible for a visit in 1 week      INR clinic    Call  Immediately to set up INR checks    Wound Care clinic    Call  Confirm appt for this week to follow up with hand wound.      MEDICATIONS ON DISCHARGE     Medication List      START taking these medications      Instructions   albuterol 108 (90 Base) MCG/ACT Aers inhalation aerosol   Inhale 2 Puffs by mouth every four hours as needed.  Dose:  2 Puff        CONTINUE taking these medications      Instructions   AMBIEN 10 MG Tabs  Generic drug:  zolpidem   Take 10 mg by mouth at bedtime as needed for Sleep.  Dose:  10 mg     amiodarone 200 MG Tabs  Commonly known as:  CORDARONE  Notes to patient:  Please evaluate your heart rate before taking.  If less then 50 please don't take and reevaluate later in the day to see if heart rate is more then 50.   Take 1 Tab by mouth 2 Times a Day.  Dose:  200 mg     calcium acetate 667 MG Caps  Commonly known as:  PHOS-LO   Take 1,334 mg by mouth 3 times a day, with meals.  Dose:  1,334 mg     Fluticasone-Umeclidin-Vilant 100-62.5-25 MCG/INH Aepb   Inhale 1 Puff by mouth every day. Rinse mouth after use  Dose:  1 Puff     levalbuterol 1.25 MG/3ML Nebu  Commonly known as:  XOPENEX   Doctor's comments:  COPD J44.9  3 mL by Nebulization  route every four hours as needed for Shortness of Breath.  Dose:  1.25 mg     metoprolol 50 MG Tabs  Commonly known as:  LOPRESSOR  Notes to patient:  Please watch your heart rate. Do not take if heart rate below 50 beats an hour.  Reevaluate later in the day to see if heart rate is above 50    Take 1 Tab by mouth 2 Times a Day.  Dose:  50 mg     omeprazole 40 MG delayed-release capsule  Commonly known as:  PRILOSEC   Take 40 mg by mouth every morning.  Dose:  40 mg     pravastatin 20 MG Tabs  Commonly known as:  PRAVACHOL   Take 20 mg by mouth every evening.  Dose:  20 mg     tamsulosin 0.4 MG capsule  Commonly known as:  FLOMAX   Take 1 Cap by mouth ONE-HALF HOUR AFTER BREAKFAST.  Dose:  0.4 mg     torsemide 10 MG tablet  Commonly known as:  DEMADEX   Take 10 mg by mouth every day.  Dose:  10 mg     warfarin 5 MG Tabs  Commonly known as:  COUMADIN   Take 2.5-5 mg by mouth every day. 2.5 mg (5 mg x 0.5) every Mon, Fri; 5 mg (5 mg x 1) all other days  Dose:  2.5-5 mg        STOP taking these medications    amoxicillin 500 MG Caps  Commonly known as:  AMOXIL            Allergies  No Known Allergies    DIET  Orders Placed This Encounter   Procedures   • Diet Order Regular, Renal     Standing Status:   Standing     Number of Occurrences:   1     Order Specific Question:   Diet:     Answer:   Regular [1]     Order Specific Question:   Diet:     Answer:   Renal [8]       ACTIVITY  As tolerated.  Weight bearing as tolerated    CONSULTATIONS  Vascular surgery   Nephrology     PROCEDURES   Aortogram  Right leg angiogram; atherectomy and angioplasty    LABORATORY  Lab Results   Component Value Date    SODIUM 137 09/14/2019    POTASSIUM 3.9 09/14/2019    CHLORIDE 97 09/14/2019    CO2 30 09/14/2019    GLUCOSE 93 09/14/2019    BUN 35 (H) 09/14/2019    CREATININE 5.36 (HH) 09/14/2019    CREATININE 2.1 (H) 05/06/2009        Lab Results   Component Value Date    WBC 3.4 (L) 09/14/2019    HEMOGLOBIN 8.7 (L) 09/14/2019    HEMATOCRIT  30.2 (L) 09/14/2019    PLATELETCT 128 (L) 09/14/2019        Total time of the discharge process exceeds 39 minutes.

## 2019-09-15 NOTE — CARE PLAN
Discussed POC with patient, however patient repeatedly asking same questions already answered.  Patient anxious for discharge.

## 2019-09-15 NOTE — PROGRESS NOTES
Patient insist I find out in Dr Peters is in hospital.  MD pageed. Per paging service apparently patient attempted to page MD himself but was unsuccessful as he is in hospital care.    Spoke with MD, she isn't in the hospital currently but will be shortly, patient updated.

## 2019-09-15 NOTE — OP REPORT
DATE OF SERVICE:  09/14/2019    PREOPERATIVE DIAGNOSIS:  Right leg ischemia.    POSTPROCEDURE DIAGNOSIS:  Right leg ischemia.    PROCEDURES:  1.  Aortogram.  2.  Ultrasound-guided left femoral artery access.  3.  Right leg angiogram, atherectomy and angioplasty.    SURGEON:  Sera Peters MD    ANESTHESIOLOGIST:  Madina Luna RN    ANESTHESIA:  Conscious sedation.    INDICATIONS:  The patient is a 79-year-old gentleman who was recently   hospitalized for cardiac problems.  During that hospital stay, right leg rest   pain was identified and a plan of treatment for outpatient care established to   give him time to get over his recent cardiac problems.  Unfortunately, this   did not occur and he presents to the hospital with an area of dry eschar and   erythema to the forefoot.  He is brought to the angio suite to identify and   treat areas of ischemia.  Risks and benefits including emboli, infection,   bleeding, device malfunction, contrast complications, and complications of   sedation were explained.  He understands and agrees to proceed.    DESCRIPTION OF PROCEDURE:  The patient was taken to the angio suite, placed in   supine position.  After adequate comfort measures were established, both   groins were prepped and draped in the usual sterile fashion with Chloraseptic   prep, cloth towels and paper drapes.  A timeout was called and the proposed   procedure confirmed with all team members.  The left femoral artery was   identified and accessed with a micropuncture needle.  A 0.018 guidewire was   advanced followed by micropuncture sheath.  I then exchanged this for a   4-Romansh sheath over an 0.035 guidewire.  Over a Glidewire, an Omniflush   catheter was advanced to the level of the renal arteries.  Using hand   injection technique, and despite the patient's movement, we established there   was no evidence of aortic or iliac artery stenosis.  The Omniflush catheter   was placed at the aortic bifurcation and  a Glidewire was advanced down into   the right lower extremity.  I then placed a glide catheter down to the right   superficial femoral artery and performed a right leg angiogram, which   identified areas of superficial femoral and popliteal stenoses.  I then placed   an Amplatz guidewire, which was too stiff and this was then replaced with a   stiff Glidewire.  The 4-Swazi sheath was removed and the 7-Swazi sheath   advanced with significant difficulty up and over the aortic bifurcation.    The patient was given an additional 4000 units of heparin and we then used a   [place] catheter and a Command wire to negotiate areas of stenosis in the   superficial femoral and popliteal artery.  The [place] catheter was used to   exchange for the bare wire and the bare wire was used to hold a filter.  We   then used the jet stream device to perform atherectomy through areas of   stenosis in the superficial femoral artery, but unfortunately, the wire became   trapped on the device and we had to remove the filter through the treated   artery.  The wire was pulled off the device, and with a significant amount of   difficulty, I was able to get the wire back down to the popliteal artery.  I   used the Command wire and the [place] catheter and then again exchanged for a   new bare wire and replaced the filter with another one.  We performed   atherectomy through the areas of critical stenosis in the superficial femoral   and popliteal artery with slow careful technique in both blades down and   blades up.  This was very difficult given the steep angle of the aortic   bifurcation and the areas of tight stenosis.  I was able to make 2 passes with   blades up and had no audible feedback on the second pass.  The atherectomy   device was removed.    I then used a 4 mm x 150 cm balloon to perform angioplasty in the popliteal   and distal superficial femoral artery.  We retrieved the filter and   demonstrated much improved perfusion to  the right foot.  I then replaced the   wire one more time to the popliteal artery and used a 5 mm x 220 cm balloon to   perform angioplasty with gentle dilatation in the popliteal artery and more   aggressive dilatation in the more proximal superficial femoral artery going up   to nominal pressures.  Final images showed excellent flow into the popliteal   artery with apparent 3-vessel runoff.    Fluoroscopy time is 43.7 minutes.  Contrast was 86 mL of Visipaque 270 and the   patient received 196.23 milligrays of radiation.       ____________________________________     MD JOAO Llamas / NTS    DD:  09/14/2019 17:03:42  DT:  09/14/2019 17:48:46    D#:  [id number]  Job#:  [id number]

## 2019-09-15 NOTE — PROGRESS NOTES
Discharging patient home per physician order. Discharge with family, demonstrated understanding of discharge instructions, follow-up appointments, home medications,and  home care for surgical wounds. Patient received e-scripts and discussed where he could pick them up.  Ambulating without assistance with a front wheel walker that he already has at home, pain well controlled, tolerating oral medications, oxygen saturation greater then 90 % on room air.  Tolerating diet, All questions answered and belongings with patient at discharge.

## 2019-09-15 NOTE — PROGRESS NOTES
Progress Note    CC: f/u for right lower extremity critical limb ischemia    Interval History: 79 y.o. male who presented 9/13/2019 with right lower extremity CLI w/ an ulcer on the toe and pain in the right heel. I performed a difficult percutaneous procedure yesterday that should have restored normal perfusion to the right foot.  He still has pain in the heel and great toe, not unexpected.  Restarted his coumadin last night, so I think he only missed one dose.  Using a walker to get around.  Seeing the wound clinic for a small, superficial wound on the dorsum of the right hand.     Review of Systems  Negative for chest pain or SOB  Negative for stroke/TIA    Medications  Prior to Admission Medications   Prescriptions Last Dose Informant Patient Reported? Taking?   Fluticasone-Umeclidin-Vilant (TRELEGY ELLIPTA) 100-62.5-25 MCG/INH AEROSOL POWDER, BREATH ACTIVATED 9/13/2019 at AM Family Member No No   Sig: Inhale 1 Puff by mouth every day. Rinse mouth after use   amiodarone (CORDARONE) 200 MG Tab 9/13/2019 at AM Family Member Yes No   Sig: Take 1 Tab by mouth 2 Times a Day.   amoxicillin (AMOXIL) 500 MG Cap 9/12/2019 at AM Family Member No No   Sig: Take 1 Cap by mouth every day for 7 days.   calcium acetate (PHOS-LO) 667 MG Cap 9/13/2019 at AM Family Member Yes No   Sig: Take 1,334 mg by mouth 3 times a day, with meals.   levalbuterol (XOPENEX) 1.25 MG/3ML Nebu Soln PRN at PRN Family Member No No   Sig: 3 mL by Nebulization route every four hours as needed for Shortness of Breath.   metoprolol (LOPRESSOR) 50 MG Tab 9/13/2019 at AM Family Member No No   Sig: Take 1 Tab by mouth 2 Times a Day.   omeprazole (PRILOSEC) 40 MG delayed-release capsule 9/13/2019 at AM Family Member Yes No   Sig: Take 40 mg by mouth every morning.   pravastatin (PRAVACHOL) 20 MG Tab 9/12/2019 at PM Family Member Yes No   Sig: Take 20 mg by mouth every evening.   tamsulosin (FLOMAX) 0.4 MG capsule 9/13/2019 at AM Family Member No No      Sig: Take 1 Cap by mouth ONE-HALF HOUR AFTER BREAKFAST.   torsemide (DEMADEX) 10 MG tablet 9/13/2019 at AM Family Member Yes No   Sig: Take 10 mg by mouth every day.   warfarin (COUMADIN) 5 MG Tab 9/12/2019 at PM Family Member Yes Yes   Sig: Take 2.5-5 mg by mouth every day. 2.5 mg (5 mg x 0.5) every Mon, Fri; 5 mg (5 mg x 1) all other days   zolpidem (AMBIEN) 10 MG Tab PRN at PRN Family Member Yes Yes   Sig: Take 10 mg by mouth at bedtime as needed for Sleep.      Facility-Administered Medications: None         Physical Exam  Vitals:    09/15/19 0805   BP: 158/64   Pulse: 63   Resp: 17   Temp: 36.5 °C (97.7 °F)   SpO2: 96%       Pulse/Extremity Exam:    Femorals:        Right: palpable       Left palpable  Pedal Pulses:       Right DP monophasic       Right PT monophasic    Wounds: right great toe eschar.  Violaceous change to the forefoot has resolved    General appearance: NAD, conversing appropriately, multiple missing teeth.  Psych: Normal affect, mood, judgement  Neuro: CN II-XII grossly intact.   Neck: full range of motion  Lungs: No inspiratory stridor or wheezing  CV: RRR  Abdomen: Soft, NT/ND  Psych: Alert, oriented to person, place, time      Labs reviewed today:  Recent Labs     09/13/19 1920 09/14/19  0558   WBC 3.9* 3.4*   RBC 3.08* 2.90*   HEMOGLOBIN 9.2* 8.7*   HEMATOCRIT 32.2* 30.2*   .5* 104.1*   MCH 29.9 30.0   MCHC 28.6* 28.8*   RDW 72.6* 72.2*   PLATELETCT 132* 128*   MPV 10.1 9.8     Recent Labs     09/13/19 1920 09/14/19  0558   SODIUM 137 137   POTASSIUM 3.4* 3.9   CHLORIDE 95* 97   CO2 30 30   GLUCOSE 98 93   BUN 24* 35*   CREATININE 4.21* 5.36*   CALCIUM 9.2 9.2     Recent Labs     09/13/19 1920 09/14/19  0558   ALTSGPT 7 7   ASTSGOT 18 16   ALKPHOSPHAT 111* 110*   TBILIRUBIN 0.7 0.7   GLUCOSE 98 93     Recent Labs     09/13/19  2244 09/14/19  0558   APTT 39.4* >240.0*   INR 2.28* 2.49*       Assessment/Plan & Medical Decision-Making:    The patient presents with right lower  extremity critical limb ischemia with digital gangrene. I completed a right leg intervention with visible good success and he is cleared for discharge from my perscective without change in medication.      Plan:  I believe the patient has a follow-up appointment scheduled in the upcoming weeks.  He asked if physical therapy might be of benefit.    Sera Peters MD  Vascular Surgeon  [facility]  Office: [phone]

## 2019-09-15 NOTE — CARE PLAN
Problem: Safety  Goal: Will remain free from falls  Outcome: PROGRESSING AS EXPECTED  Intervention: Implement fall precautions  Flowsheets  Taken 9/14/2019 0231 by Joao Emmanuel RLiseNLise  Bed Alarm: Alarm Not On  Taken 9/14/2019 1125 by Shi Lee RBRIANNA.  Environmental Precautions: Personal Belongings, Wastebasket, Call Bell etc. in Easy Reach;Transferred to Stronger Side;Report Given to Other Health Care Providers Regarding Fall Risk;Bed in Low Position;Communication Sign for Patients & Families;Mobility Assessed & Appropriate Sign Placed  Note:   Call light within reach of pt. Pt understands when to call for help. Fall risk band on.

## 2019-09-15 NOTE — PROGRESS NOTES
Nephrology Daily Progress Note    Date of Service  9/15/2019    Chief Complaint  79 y.o. male admitted 9/13/2019 with ESRD, PVD, presented with ischemic foot    Interval Problem Update  Pt had angioplasty, doing better, anxious to go home    Review of Systems  Review of Systems   Constitutional: Negative for chills, fever and malaise/fatigue.   Respiratory: Negative for cough and shortness of breath.    Cardiovascular: Negative for chest pain and leg swelling.   Gastrointestinal: Negative for nausea and vomiting.   Genitourinary: Negative for dysuria, frequency and urgency.        Physical Exam  Temp:  [36.1 °C (96.9 °F)-36.5 °C (97.7 °F)] 36.3 °C (97.4 °F)  Pulse:  [48-71] 71  Resp:  [11-19] 16  BP: (132-183)/(49-71) 149/65  SpO2:  [90 %-100 %] 97 %    Physical Exam   Constitutional: He is oriented to person, place, and time. No distress.   HENT:   Mouth/Throat: No oropharyngeal exudate.   Eyes: No scleral icterus.   Cardiovascular: Normal rate and regular rhythm.   No murmur heard.  Pulmonary/Chest: Effort normal and breath sounds normal. No respiratory distress. He has no wheezes.   Musculoskeletal: He exhibits no edema or deformity.   Neurological: He is alert and oriented to person, place, and time.   Skin: Skin is warm. He is not diaphoretic. No erythema.   Psychiatric: He has a normal mood and affect. His behavior is normal.   Nursing note and vitals reviewed.      Fluids    Intake/Output Summary (Last 24 hours) at 9/15/2019 1308  Last data filed at 9/14/2019 2338  Gross per 24 hour   Intake 120 ml   Output --   Net 120 ml       Laboratory  Recent Labs     09/13/19 1920 09/14/19  0558   WBC 3.9* 3.4*   RBC 3.08* 2.90*   HEMOGLOBIN 9.2* 8.7*   HEMATOCRIT 32.2* 30.2*   .5* 104.1*   MCH 29.9 30.0   MCHC 28.6* 28.8*   RDW 72.6* 72.2*   PLATELETCT 132* 128*   MPV 10.1 9.8     Recent Labs     09/13/19 1920 09/14/19  0558   SODIUM 137 137   POTASSIUM 3.4* 3.9   CHLORIDE 95* 97   CO2 30 30   GLUCOSE 98 93    BUN 24* 35*   CREATININE 4.21* 5.36*   CALCIUM 9.2 9.2     Recent Labs     09/13/19  2244 09/14/19  0558   APTT 39.4* >240.0*   INR 2.28* 2.49*     No results for input(s): NTPROBNP in the last 72 hours.        Imaging    Assessment/Plan  1 ESRD  2 PVD  3 Anemia  Plan  no need for HD today  Renal diet  Daily labs  Renal dose all meds  Avoid nephrotoxins  D/W Dr Loco

## 2019-09-15 NOTE — DISCHARGE INSTRUCTIONS
Discharge Instructions    Discharged to home by car with relative. Discharged via wheelchair, hospital escort: Yes.  Special equipment needed: Not Applicable    Be sure to schedule a follow-up appointment with your primary care doctor or any specialists as instructed.     Discharge Plan:   Diet Plan: Discussed  Activity Level: Discussed  Confirmed Follow up Appointment: Patient to Call and Schedule Appointment  Confirmed Symptoms Management: Discussed  Medication Reconciliation Updated: Yes  Influenza Vaccine Indication: Patient Refuses    I understand that a diet low in cholesterol, fat, and sodium is recommended for good health. Unless I have been given specific instructions below for another diet, I accept this instruction as my diet prescription.   Other diet: renal diet    Special Instructions:   Groin Site Care  Refer to this sheet in the next few weeks. These instructions provide you with information on caring for yourself after your procedure. Your caregiver may also give you more specific instructions. Your treatment has been planned according to current medical practices, but problems sometimes occur. Call your caregiver if you have any problems or questions after your procedure.  HOME CARE INSTRUCTIONS  · You may shower 24 hours after the procedure. Remove the bandage (dressing) and gently wash the site with plain soap and water. Gently pat the site dry.  · Do not apply powder or lotion to the site.  · Do not sit in a bathtub, swimming pool, or whirlpool for 5 to 7 days.  · No bending, squatting, or lifting anything over 10 pounds (4.5 kg) as directed by your caregiver.  · Inspect the site at least twice daily.  · Do not drive home if you are discharged the same day of the procedure. Have someone else drive you.  · You may drive 24 hours after the procedure unless otherwise instructed by your caregiver.  What to expect:  · Any bruising will usually fade within 1 to 2 weeks.  · Blood that collects in the  tissue (hematoma) may be painful to the touch. It should usually decrease in size and tenderness within 1 to 2 weeks.  SEEK IMMEDIATE MEDICAL CARE IF:  · You have unusual pain at the groin site or down the affected leg.  · You have redness, warmth, swelling, or pain at the groin site.  · You have drainage (other than a small amount of blood on the dressing).  · You have chills.  · You have a fever or persistent symptoms for more than 72 hours.  · You have a fever and your symptoms suddenly get worse.  · Your leg becomes pale, cool, tingly, or numb.  · You have heavy bleeding from the site. Hold pressure on the site.  Document Released: 01/20/2012 Document Revised: 03/11/2013 Document Reviewed: 01/20/2012  ExitCare® Patient Information ©2014 [employer].    Angiogram, Care After  These instructions give you information about caring for yourself after your procedure. Your doctor may also give you more specific instructions. Call your doctor if you have any problems or questions after your procedure.   HOME CARE  · Take medicines only as told by your doctor.  · Follow your doctor's instructions about:  ¨ Care of the area where the tube was inserted.  ¨ Bandage (dressing) changes and removal.  · You may shower 24-48 hours after the procedure or as told by your doctor.  · Do not take baths, swim, or use a hot tub until your doctor approves.  · Every day, check the area where the tube was inserted. Watch for:  ¨ Redness, swelling, or pain.  ¨ Fluid, blood, or pus.  · Do not apply powder or lotion to the site.  · Do not lift anything that is heavier than 10 lb (4.5 kg) for 5 days or as told by your doctor.  · Ask your doctor when you can:  ¨ Return to work or school.  ¨ Do physical activities or play sports.  ¨ Have sex.  · Do not drive or operate heavy machinery for 24 hours or as told by your doctor.  · Have someone with you for the first 24 hours after the procedure.  · Keep all follow-up visits as told by your  doctor. This is important.  GET HELP IF:  · You have a fever.    · You have chills.    · You have more bleeding from the area where the tube was inserted. Hold pressure on the area.  · You have redness, swelling, or pain in the area where the tube was inserted.  · You have fluid or pus coming from the area.  GET HELP RIGHT AWAY IF:   · You have a lot of pain in the area where the tube was inserted.  · The area where the tube was inserted is bleeding, and the bleeding does not stop after 30 minutes of holding steady pressure on the area.  · The area near or just beyond the insertion site becomes pale, cool, tingly, or numb.     This information is not intended to replace advice given to you by your health care provider. Make sure you discuss any questions you have with your health care provider.     Document Released: 03/16/2010 Document Revised: 01/08/2016 Document Reviewed: 07/06/2015  [employer] Interactive Patient Education ©2016 [employer]    · Is patient discharged on Warfarin / Coumadin?   Yes    You are receiving the drug warfarin. Please understand the importance of monitoring warfarin with scheduled PT/INR blood draws.  Follow-up with a call to your personal Doctor's office in 3 days to schedule a PT/INR.  IMPORTANT: HOW TO USE THIS INFORMATION:  This is a summary and does NOT have all possible information about this product. This information does not assure that this product is safe, effective, or appropriate for you. This information is not individual medical advice and does not substitute for the advice of your health care professional. Always ask your health care professional for complete information about this product and your specific health needs.      WARFARIN - ORAL (WARF-uh-rin)      COMMON BRAND NAME(S): Coumadin      WARNING:  Warfarin can cause very serious (possibly fatal) bleeding. This is more likely to occur when you first start taking this medication or if you take too much warfarin. To  "decrease your risk for bleeding, your doctor or other health care provider will monitor you closely and check your lab results (INR test) to make sure you are not taking too much warfarin. Keep all medical and laboratory appointments. Tell your doctor right away if you notice any signs of serious bleeding. See also Side Effects section.      USES:  This medication is used to treat blood clots (such as in deep vein thrombosis-DVT or pulmonary embolus-PE) and/or to prevent new clots from forming in your body. Preventing harmful blood clots helps to reduce the risk of a stroke or heart attack. Conditions that increase your risk of developing blood clots include a certain type of irregular heart rhythm (atrial fibrillation), heart valve replacement, recent heart attack, and certain surgeries (such as hip/knee replacement). Warfarin is commonly called a \"blood thinner,\" but the more correct term is \"anticoagulant.\" It helps to keep blood flowing smoothly in your body by decreasing the amount of certain substances (clotting proteins) in your blood.      HOW TO USE:  Read the Medication Guide provided by your pharmacist before you start taking warfarin and each time you get a refill. If you have any questions, ask your doctor or pharmacist. Take this medication by mouth with or without food as directed by your doctor or other health care professional, usually once a day. It is very important to take it exactly as directed. Do not increase the dose, take it more frequently, or stop using it unless directed by your doctor. Dosage is based on your medical condition, laboratory tests (such as INR), and response to treatment. Your doctor or other health care provider will monitor you closely while you are taking this medication to determine the right dose for you. Use this medication regularly to get the most benefit from it. To help you remember, take it at the same time each day. It is important to eat a balanced, consistent " diet while taking warfarin. Some foods can affect how warfarin works in your body and may affect your treatment and dose. Avoid sudden large increases or decreases in your intake of foods high in vitamin K (such as broccoli, cauliflower, cabbage, brussels sprouts, kale, spinach, and other green leafy vegetables, liver, green tea, certain vitamin supplements). If you are trying to lose weight, check with your doctor before you try to go on a diet. Cranberry products may also affect how your warfarin works. Limit the amount of cranberry juice (16 ounces/480 milliliters a day) or other cranberry products you may drink or eat.      SIDE EFFECTS:  Nausea, loss of appetite, or stomach/abdominal pain may occur. If any of these effects persist or worsen, tell your doctor or pharmacist promptly. Remember that your doctor has prescribed this medication because he or she has judged that the benefit to you is greater than the risk of side effects. Many people using this medication do not have serious side effects. This medication can cause serious bleeding if it affects your blood clotting proteins too much (shown by unusually high INR lab results). Even if your doctor stops your medication, this risk of bleeding can continue for up to a week. Tell your doctor right away if you have any signs of serious bleeding, including: unusual pain/swelling/discomfort, unusual/easy bruising, prolonged bleeding from cuts or gums, persistent/frequent nosebleeds, unusually heavy/prolonged menstrual flow, pink/dark urine, coughing up blood, vomit that is bloody or looks like coffee grounds, severe headache, dizziness/fainting, unusual or persistent tiredness/weakness, bloody/black/tarry stools, chest pain, shortness of breath, difficulty swallowing. Tell your doctor right away if any of these unlikely but serious side effects occur: persistent nausea/vomiting, severe stomach/abdominal pain, yellowing eyes/skin. This drug rarely has caused very  serious (possibly fatal) problems if its effects lead to small blood clots (usually at the beginning of treatment). This can lead to severe skin/tissue damage that may require surgery or amputation if left untreated. Patients with certain blood conditions (protein C or S deficiency) may be at greater risk. Get medical help right away if any of these rare but serious side effects occur: painful/red/purplish patches on the skin (such as on the toe, breast, abdomen), change in the amount of urine, vision changes, confusion, slurred speech, weakness on one side of the body. A very serious allergic reaction to this drug is rare. However, get medical help right away if you notice any symptoms of a serious allergic reaction, including: rash, itching/swelling (especially of the face/tongue/throat), severe dizziness, trouble breathing. This is not a complete list of possible side effects. If you notice other effects not listed above, contact your doctor or pharmacist. In the [place] - Call your doctor for medical advice about side effects. You may report side effects to FDA at 0-552-XMS-9745. In [place] - Call your doctor for medical advice about side effects. You may report side effects to [facility] [place] at 1-150.324.6111.      PRECAUTIONS:  Before taking warfarin, tell your doctor or pharmacist if you are allergic to it; or if you have any other allergies. This product may contain inactive ingredients, which can cause allergic reactions or other problems. Talk to your pharmacist for more details. Before using this medication, tell your doctor or pharmacist your medical history, especially of: blood disorders (such as anemia, hemophilia), bleeding problems (such as bleeding of the stomach/intestines, bleeding in the brain), blood vessel disorders (such as aneurysms), recent major injury/surgery, liver disease, alcohol use, mental/mood disorders (including memory problems), frequent falls/injuries. It is important that all your  doctors and dentists know that you take warfarin. Before having surgery or any medical/dental procedures, tell your doctor or dentist that you are taking this medication and about all the products you use (including prescription drugs, nonprescription drugs, and herbal products). Avoid getting injections into the muscles. If you must have an injection into a muscle (for example, a flu shot), it should be given in the arm. This way, it will be easier to check for bleeding and/or apply pressure bandages. This medication may cause stomach bleeding. Daily use of alcohol while using this medicine will increase your risk for stomach bleeding and may also affect how this medication works. Limit or avoid alcoholic beverages. If you have not been eating well, if you have an illness or infection that causes fever, vomiting, or diarrhea for more than 2 days, or if you start using any antibiotic medications, contact your doctor or pharmacist immediately because these conditions can affect how warfarin works. This medication can cause heavy bleeding. To lower the chance of getting cut, bruised, or injured, use great caution with sharp objects like safety razors and nail cutters. Use an electric razor when shaving and a soft toothbrush when brushing your teeth. Avoid activities such as contact sports. If you fall or injure yourself, especially if you hit your head, call your doctor immediately. Your doctor may need to check you. The Food & Drug Administration has stated that generic warfarin products are interchangeable. However, consult your doctor or pharmacist before switching warfarin products. Be careful not to take more than one medication that contains warfarin unless specifically directed by the doctor or health care provider who is monitoring your warfarin treatment. Older adults may be at greater risk for bleeding while using this drug. This medication is not recommended for use during pregnancy because of serious  "(possibly fatal) harm to an unborn baby. Discuss the use of reliable forms of birth control with your doctor. If you become pregnant or think you may be pregnant, tell your doctor immediately. If you are planning pregnancy, discuss a plan for managing your condition with your doctor before you become pregnant. Your doctor may switch the type of medication you use during pregnancy. Very small amounts of this medication may pass into breast milk but is unlikely to harm a nursing infant. Consult your doctor before breast-feeding.      DRUG INTERACTIONS:  Drug interactions may change how your medications work or increase your risk for serious side effects. This document does not contain all possible drug interactions. Keep a list of all the products you use (including prescription/nonprescription drugs and herbal products) and share it with your doctor and pharmacist. Do not start, stop, or change the dosage of any medicines without your doctor's approval. Warfarin interacts with many prescription, nonprescription, vitamin, and herbal products. This includes medications that are applied to the skin or inside the vagina or rectum. The interactions with warfarin usually result in an increase or decrease in the \"blood-thinning\" (anticoagulant) effect. Your doctor or other health care professional should closely monitor you to prevent serious bleeding or clotting problems. While taking warfarin, it is very important to tell your doctor or pharmacist of any changes in medications, vitamins, or herbal products that you are taking. Some products that may interact with this drug include: capecitabine, imatinib, mifepristone. Aspirin, aspirin-like drugs (salicylates), and nonsteroidal anti-inflammatory drugs (NSAIDs such as ibuprofen, naproxen, celecoxib) may have effects similar to warfarin. These drugs may increase the risk of bleeding problems if taken during treatment with warfarin. Carefully check all " prescription/nonprescription product labels (including drugs applied to the skin such as pain-relieving creams) since the products may contain NSAIDs or salicylates. Talk to your doctor about using a different medication (such as acetaminophen) to treat pain/fever. Low-dose aspirin and related drugs (such as clopidogrel, ticlopidine) should be continued if prescribed by your doctor for specific medical reasons such as heart attack or stroke prevention. Consult your doctor or pharmacist for more details. Many herbal products interact with warfarin. Tell your doctor before taking any herbal products, especially bromelains, coenzyme Q10, cranberry, danshen, dong quai, fenugreek, garlic, ginkgo biloba, ginseng, and [place] wort, among others. This medication may interfere with a certain laboratory test to measure theophylline levels, possibly causing false test results. Make sure laboratory personnel and all your doctors know you use this drug.      OVERDOSE:  If overdose is suspected, contact a poison control center or emergency room immediately. [place] residents can call the [place] National Poison Hotline at 1-407.188.6778. [place] residents can call a provincial poison control center. Symptoms of overdose may include: bloody/black/tarry stools, pink/dark urine, unusual/prolonged bleeding.      NOTES:  Do not share this medication with others. Laboratory and/or medical tests (such as INR, complete blood count) must be performed periodically to monitor your progress or check for side effects. Consult your doctor for more details.      MISSED DOSE:  For the best possible benefit, do not miss any doses. If you do miss a dose and remember on the same day, take it as soon as you remember. If you remember on the next day, skip the missed dose and resume your usual dosing schedule. Do not double the dose to catch up because this could increase your risk for bleeding. Keep a record of missed doses to give to your doctor or  pharmacist. Contact your doctor or pharmacist if you miss 2 or more doses in a row.      STORAGE:  Store at room temperature away from light and moisture. Do not store in the bathroom. Keep all medications away from children and pets. Do not flush medications down the toilet or pour them into a drain unless instructed to do so. Properly discard this product when it is  or no longer needed. Consult your pharmacist or local waste disposal company for more details about how to safely discard your product.      MEDICAL ALERT:  Your condition and medication can cause complications in a medical emergency. For information about enrolling in MedicAlert, call 1-466.377.9818 ([place]) or 1-756.928.4728 ([place]).      Information last revised 2010 Copyright(c) 2010 [employer]             Depression / Suicide Risk    As you are discharged from this [facility] facility, it is important to learn how to keep safe from harming yourself.    Recognize the warning signs:  · Abrupt changes in personality, positive or negative- including increase in energy   · Giving away possessions  · Change in eating patterns- significant weight changes-  positive or negative  · Change in sleeping patterns- unable to sleep or sleeping all the time   · Unwillingness or inability to communicate  · Depression  · Unusual sadness, discouragement and loneliness  · Talk of wanting to die  · Neglect of personal appearance   · Rebelliousness- reckless behavior  · Withdrawal from people/activities they love  · Confusion- inability to concentrate     If you or a loved one observes any of these behaviors or has concerns about self-harm, here's what you can do:  · Talk about it- your feelings and reasons for harming yourself  · Remove any means that you might use to hurt yourself (examples: pills, rope, extension cords, firearm)  · Get professional help from the community (Mental Health, Substance Abuse, psychological counseling)  · Do not  be alone:Call your Safe Contact- someone whom you trust who will be there for you.  · Call your local CRISIS HOTLINE 933-2769 or 307-372-5508  · Call your local Children's Mobile Crisis Response Team [place] (896) 875-3282 or www.[employer]  · Call the toll free National Suicide Prevention Hotlines   · National Suicide Prevention Lifeline 000-305-UHXS (9538)  · National [place] Line Network 800-SUICIDE (579-6222)    Discharge Instructions per Timbo Loco M.D.    Follow up with vascular surgery as outpatient     DIET: as tolerated    ACTIVITY: as tolerated     DIAGNOSIS:  right lower extremity critical limb ischemia with digital gangrene    Return to ER if symptoms worsen

## 2019-09-16 ENCOUNTER — TELEPHONE (OUTPATIENT)
Dept: MEDICAL GROUP | Facility: MEDICAL CENTER | Age: 79
End: 2019-09-16

## 2019-09-16 NOTE — TELEPHONE ENCOUNTER
VOICEMAIL  1. Caller Name: Parmjit Castelan                        Call Back Number: [phone] (home)      2. Message: Patient called and left a message stating he needed to cancel his appointment on 09/17. I have called patient back and left him a message to call us back to go over this.     3. Patient approves office to leave a detailed voicemail/MyChart message: yes

## 2019-09-17 ENCOUNTER — ANTICOAGULATION MONITORING (OUTPATIENT)
Dept: VASCULAR LAB | Facility: MEDICAL CENTER | Age: 79
End: 2019-09-17

## 2019-09-17 ENCOUNTER — APPOINTMENT (OUTPATIENT)
Dept: WOUND CARE | Facility: MEDICAL CENTER | Age: 79
End: 2019-09-17
Attending: PHYSICIAN ASSISTANT
Payer: MEDICARE

## 2019-09-17 ENCOUNTER — OFFICE VISIT (OUTPATIENT)
Dept: MEDICAL GROUP | Facility: MEDICAL CENTER | Age: 79
End: 2019-09-17
Payer: MEDICARE

## 2019-09-17 VITALS
RESPIRATION RATE: 12 BRPM | HEART RATE: 70 BPM | BODY MASS INDEX: 24.66 KG/M2 | HEIGHT: 68 IN | SYSTOLIC BLOOD PRESSURE: 122 MMHG | TEMPERATURE: 98.7 F | OXYGEN SATURATION: 92 % | DIASTOLIC BLOOD PRESSURE: 56 MMHG | WEIGHT: 162.7 LBS

## 2019-09-17 DIAGNOSIS — I48.3 TYPICAL ATRIAL FLUTTER (HCC): ICD-10-CM

## 2019-09-17 DIAGNOSIS — Z09 HOSPITAL DISCHARGE FOLLOW-UP: ICD-10-CM

## 2019-09-17 DIAGNOSIS — I73.9 PAD (PERIPHERAL ARTERY DISEASE) (HCC): ICD-10-CM

## 2019-09-17 PROCEDURE — 99213 OFFICE O/P EST LOW 20 MIN: CPT | Performed by: FAMILY MEDICINE

## 2019-09-17 NOTE — ASSESSMENT & PLAN NOTE
Patient being seen today in follow-up from being discharged from the hospital on 9/15/2019.  He was admitted on 9/13/2019 for peripheral vascular disease and a black toe and right foot pain.  Patient tells me that his right foot is now more swollen and erythematous than it was when he was discharged from the hospital.  He states that the pain is unbearable.  He would like to be seen at vascular surgery.  He does have some blistering that is starting on the dorsum of the foot as well as one on the medial portion of the ankle.  I called Dr. Peters the vascular surgeon and spoke with her directly.  She states that the patient has good flow of blood to the right lower extremity after his atherectomy that was done over the weekend.  She wants him to be home with his foot elevated.  In talking with the patient further he has not been elevating his foot.  Without elevation he is going to experience more pain and have more blistering.  His wife who accompanies him will help him to remember that he has to elevate his foot on a daily basis.  Foot has to be higher than his hips throughout the day.

## 2019-09-17 NOTE — PROGRESS NOTES
Anticoagulation Summary  As of 2019    INR goal:   2.0-3.0   TTR:   36.1 % (1.6 mo)   INR used for dosin.49 (2019)   Warfarin maintenance plan:   2.5 mg (5 mg x 0.5) every Mon, Fri; 5 mg (5 mg x 1) all other days   Weekly warfarin total:   30 mg   Plan last modified:   Catina Seymour (2019)   Next INR check:      Target end date:   Indefinite    Indications    Atrial flutter (HCC) [I48.92]             Anticoagulation Episode Summary     INR check location:   Anticoagulation Clinic    Preferred lab:       Send INR reminders to:       Comments:   [facility] lab      Anticoagulation Care Providers     Provider Role Specialty Phone number    [facility]   [phone]    Martha Light M.D.  [facility] [phone]        Anticoagulation Patient Findings          Spoke with Ronny to report a therapeutic INR of 2.5. Continue current dosing regimen.  Follow up in 1 weeks, to reduce the risk of adverse events related to this high risk medication, warfarin.    Catina Seymour Clinical Pharmacist

## 2019-09-17 NOTE — PROGRESS NOTES
CC:Diagnoses of PAD (peripheral artery disease) ([facility]) and Hospital discharge follow-up were pertinent to this visit.    HISTORY OF PRESENT ILLNESS: Patient is a 79 y.o. male established patient who presents today to talk about his recent hospitalization.      PAD (peripheral artery disease) ([facility])  Patient being seen today in follow-up from being discharged from the hospital on 9/15/2019.  He was admitted on 9/13/2019 for peripheral vascular disease and a black toe and right foot pain.  Patient tells me that his right foot is now more swollen and erythematous than it was when he was discharged from the hospital.  He states that the pain is unbearable.  He would like to be seen at vascular surgery.  He does have some blistering that is starting on the dorsum of the foot as well as one on the medial portion of the ankle.  I called Dr. Peters the vascular surgeon and spoke with her directly.  She states that the patient has good flow of blood to the right lower extremity after his atherectomy that was done over the weekend.  She wants him to be home with his foot elevated.  In talking with the patient further he has not been elevating his foot.  Without elevation he is going to experience more pain and have more blistering.  His wife who accompanies him will help him to remember that he has to elevate his foot on a daily basis.  Foot has to be higher than his hips throughout the day.    Hospital discharge follow-up  Patient is here today accompanied by his wife after being hospitalized from 9/13/2019 until 9/15/2019.  He was initially hospitalized due to peripheral vascular disease and an ischemic right foot and great toe.  He was rosanne enough to have a vascular procedure that actually went quite well and he was noted to have angioplasty and atherectomy done on 9/14/2019 which reestablished blood flow to the right foot.  He also has end-stage renal disease and is complying with dialysis.  He was to go to the wound  clinic in 48 hours to have them look at the right foot after talking with Dr. Peters his vascular surgeon, she does not want his foot addressed at the wound clinic.  He does tell me he has a lesion on the dorsum of the right hand that does need to be seen by the wound clinic.      Patient Active Problem List    Diagnosis Date Noted   • Atrial flutter ([facility]) 06/12/2019     Priority: High   • Systolic CHF, acute ([facility]) 05/11/2016     Priority: High   • Chronic anticoagulation 06/12/2019     Priority: Medium   • Aortic stenosis 04/20/2017     Priority: Medium   • Hypotension 08/13/2016     Priority: Medium   • Essential hypertension 05/11/2016     Priority: Medium   • PAD (peripheral artery disease) ([facility]) 08/10/2015     Priority: Medium   • Dyslipidemia 12/05/2014     Priority: Medium   • Elevated coronary artery calcium score 02/20/2014     Priority: Medium   • HELGA (obstructive sleep apnea) 05/13/2011     Priority: Medium   • Rash 03/07/2019     Priority: Low   • History of basal cell carcinoma (BCC) 03/07/2019     Priority: Low   • Acute gastric ulcer 10/19/2018     Priority: Low   • Primary insomnia 03/22/2018     Priority: Low   • Gastric polyps 03/21/2018     Priority: Low   • Esophagitis 03/21/2018     Priority: Low   • Uremia 03/20/2018     Priority: Low   • Pedal edema 03/21/2017     Priority: Low   • AVM (arteriovenous malformation) 09/08/2016     Priority: Low   • Renal cyst 05/11/2016     Priority: Low   • Hyperkalemia, diminished renal excretion 05/08/2016     Priority: Low   • Leukocytosis 05/08/2016     Priority: Low   • ESRD (end stage renal disease) on dialysis ([facility]) 05/08/2016     Priority: Low   • Uric acid arthropathy 01/07/2016     Priority: Low   • Primary osteoarthritis of both hands 08/20/2015     Priority: Low   • Anemia in CKD (chronic kidney disease) 05/29/2015     Priority: Low   • 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[facility]) 07/02/2013     Priority: Low   • Secondary renal hyperparathyroidism ([facility]) 12/14/2012      Priority: Low   • COPD (chronic obstructive pulmonary disease) ([facility]) 08/02/2011     Priority: Low   • Hard of hearing 04/22/2011     Priority: Low   • BPH (benign prostatic hypertrophy) 07/14/2009     Priority: Low   • Hospital discharge follow-up 09/17/2019   • 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    Current Outpatient Medications   Medication Sig Dispense Refill   • albuterol 108 (90 Base) MCG/ACT Aero Soln inhalation aerosol Inhale 2 Puffs by mouth every four hours as needed. 8.5 g 0   • zolpidem (AMBIEN) 10 MG Tab Take 10 mg by mouth at bedtime as needed for Sleep.     • warfarin (COUMADIN) 5 MG Tab Take 2.5-5 mg by mouth every day. 2.5 mg (5 mg x 0.5) every Mon, Fri; 5 mg (5 mg x 1) all other days     • torsemide (DEMADEX) 10 MG tablet Take 10 mg by mouth every day.     • amiodarone (CORDARONE) 200 MG Tab Take 1 Tab by mouth 2 Times a Day. 60 Tab 11   • tamsulosin (FLOMAX) 0.4 MG capsule Take 1 Cap by mouth ONE-HALF HOUR AFTER BREAKFAST. 90 Cap 0   • metoprolol (LOPRESSOR) 50 MG Tab Take 1 Tab by mouth 2 Times a Day. 60 Tab 0   • omeprazole (PRILOSEC) 40 MG delayed-release capsule Take 40 mg by mouth every morning.     • calcium acetate (PHOS-LO) 667 MG Cap Take 1,334 mg by mouth 3 times a day, with meals.     • Fluticasone-Umeclidin-Vilant (TRELEGY ELLIPTA) 100-62.5-25 MCG/INH AEROSOL POWDER, BREATH ACTIVATED Inhale 1 Puff by mouth every day. Rinse mouth after use 1 Each 11   • levalbuterol (XOPENEX) 1.25 MG/3ML Nebu Soln 3 mL by Nebulization route every four hours as needed for Shortness of Breath. 120 Bullet 11   • pravastatin (PRAVACHOL) 20 MG Tab Take 20 mg by  mouth every evening.       No current facility-administered medications for this visit.        Social History     Tobacco Use   • Smoking status: Former Smoker     Packs/day: 1.00     Years: 40.00     Pack years: 40.00     Types: Cigarettes     Last attempt to quit: 1/1/2009     Years since quitting: 10.7   • Smokeless tobacco: Never Used   • Tobacco comment: 1 pk a day for 35 yrs, QUIT JAN 1 2010   Substance Use Topics   • Alcohol use: No     Alcohol/week: 0.0 oz   • Drug use: No     Social History     Social History Narrative   • Not on file       Family History   Problem Relation Age of Onset   • Stroke Mother    • Hypertension Mother    • Lung Disease Father         Emphysema, resp failure   • Genitourinary () Problems Maternal Aunt         hematuria   • Hypertension Brother         ROS:     - Constitutional:  Negative for fever, chills, unexpected weight change, and fatigue/generalized weakness.    - HEENT:  Negative for headaches, vision changes, hearing changes, ear pain, ear discharge, rhinorrhea, sinus congestion, sore throat, and neck pain.      - Respiratory: Negative for cough, sputum production, chest congestion, dyspnea, wheezing, and crackles.      - Cardiovascular: Negative for chest pain, palpitations, orthopnea, and bilateral lower extremity edema.     - Gastrointestinal: Negative for heartburn, nausea, vomiting, abdominal pain, hematochezia, melena, diarrhea, constipation, and greasy/foul-smelling stools.     - Genitourinary: Negative for dysuria, polyuria, hematuria, pyuria, urinary urgency, and urinary incontinence.     - Musculoskeletal: Right lower extremity pain secondary to the foot being in a dependent position and developing edema.      - Skin: 1 bullae that is ruptured on the medial portion of the right ankle no signs of secondary infection. Neurological: Negative for dizziness, tingling, tremors, focal sensory deficit, focal weakness and headaches.     - Endo/Heme/Allergies: Does not  "bruise/bleed easily.     - Psychiatric/Behavioral: Negative for depression, suicidal/homicidal ideation and memory loss.          - NOTE: All other systems reviewed and are negative, except as in HPI.      Exam:    /56 (BP Location: Right arm, Patient Position: Sitting, BP Cuff Size: Adult)   Pulse 70   Temp 37.1 °C (98.7 °F) (Temporal)   Resp 12   Ht 1.727 m (5' 8\")   Wt 73.8 kg (162 lb 11.2 oz)   SpO2 92%  Body mass index is 24.74 kg/m².    General:  Well nourished, well developed male in obvious pain with a swollen right foot  Head is grossly normal.  Neck: Supple without JVD or bruit. Thyroid is not enlarged.  Pulmonary: Clear to ausculation and percussion.  Normal effort. No rales, ronchi, or wheezing.  Cardiovascular: Irregularly irregular regular rate and rhythm without murmur. Carotid and radial pulses are intact and equal bilaterally.  Right foot: Increased edema to the ankle but not above, still has a blackened dorsum of the right great toe.  Left foot no edema decreased pulse bilaterally at the dorsalis pedis and posterior tibial but they are palpable.  Patient was seen for 30 minutes face to face of which, 25 minutes was spent counseling regarding medications interactions and side effects, discussion on the phone with Dr. Peters the vascular surgeon who is the attending as well as the plan going forward for follow-up care.    Please note that this dictation was created using voice recognition software. I have made every reasonable attempt to correct obvious errors, but I expect that there are errors of grammar and possibly content that I did not discover before finalizing the note.    Assessment/Plan:  1. PAD (peripheral artery disease) (HCC)  Uncontrolled, patient does have good runoff and pulses on the right.  He has to rest with his foot elevated at all times except for when he is moving from room to room or going to the restroom.  I have emphasized to him the need to do this.  His wife " states that she will try to help him.    2. Hospital discharge follow-up  Patient will keep his follow-up appointment in 9 days with Dr. Peters

## 2019-09-17 NOTE — TELEPHONE ENCOUNTER
VOICEMAIL  1. Caller Name: Parmjit Castelan                        Call Back Number: [phone] (home)      2. Message: Patient called and left a message for us to call him back. I have called patient back and left him a message to call us back.     3. Patient approves office to leave a detailed voicemail/MyChart message: yes

## 2019-09-17 NOTE — ASSESSMENT & PLAN NOTE
Patient is here today accompanied by his wife after being hospitalized from 9/13/2019 until 9/15/2019.  He was initially hospitalized due to peripheral vascular disease and an ischemic right foot and great toe.  He was rosanne enough to have a vascular procedure that actually went quite well and he was noted to have angioplasty and atherectomy done on 9/14/2019 which reestablished blood flow to the right foot.  He also has end-stage renal disease and is complying with dialysis.  He was to go to the wound clinic in 48 hours to have them look at the right foot after talking with Dr. Peters his vascular surgeon, she does not want his foot addressed at the wound clinic.  He does tell me he has a lesion on the dorsum of the right hand that does need to be seen by the wound clinic.

## 2019-09-18 ENCOUNTER — HOSPITAL ENCOUNTER (OUTPATIENT)
Dept: LAB | Facility: MEDICAL CENTER | Age: 79
End: 2019-09-18
Attending: NURSE PRACTITIONER
Payer: MEDICARE

## 2019-09-18 ENCOUNTER — ANTICOAGULATION MONITORING (OUTPATIENT)
Dept: VASCULAR LAB | Facility: MEDICAL CENTER | Age: 79
End: 2019-09-18

## 2019-09-18 DIAGNOSIS — I48.92 ATRIAL FLUTTER, UNSPECIFIED TYPE (HCC): ICD-10-CM

## 2019-09-18 DIAGNOSIS — I48.3 TYPICAL ATRIAL FLUTTER (HCC): ICD-10-CM

## 2019-09-18 LAB
BACTERIA BLD CULT: NORMAL
BACTERIA BLD CULT: NORMAL
INR PPP: 3.24 (ref 0.87–1.13)
PROTHROMBIN TIME: 34.3 SEC (ref 12–14.6)
SIGNIFICANT IND 70042: NORMAL
SIGNIFICANT IND 70042: NORMAL
SITE SITE: NORMAL
SITE SITE: NORMAL
SOURCE SOURCE: NORMAL
SOURCE SOURCE: NORMAL

## 2019-09-18 PROCEDURE — 85610 PROTHROMBIN TIME: CPT

## 2019-09-18 PROCEDURE — 36415 COLL VENOUS BLD VENIPUNCTURE: CPT

## 2019-09-18 NOTE — PROGRESS NOTES
Anticoagulation Summary  As of 9/18/2019    INR goal:   2.0-3.0   TTR:   36.1 % (1.6 mo)   INR used for dosing:   3.24! (9/18/2019)   Warfarin maintenance plan:   2.5 mg (5 mg x 0.5) every Mon, Fri; 5 mg (5 mg x 1) all other days   Weekly warfarin total:   30 mg   Plan last modified:   [facility] OSEI Filter (9/17/2019)   Next INR check:   9/25/2019   Target end date:   Indefinite    Indications    Atrial flutter ([facility]) [I48.92]             Anticoagulation Episode Summary     INR check location:   Anticoagulation Clinic    Preferred lab:       Send INR reminders to:       Comments:   [facility] lab      Anticoagulation Care Providers     Provider Role Specialty Phone number    [facility]   [phone]    Martha Light M.D.  Family Medicine [phone]        Anticoagulation Patient Findings      Left voicemail message to report a subtherapeutic INR of 3.24.    Will have pt take reduced dose of warfarin today of 2.5 mg and then pt to continue with current warfarin dosing regimen.     Requested pt contact the clinic for any s/s of unusual bleeding, bruising, clotting or any changes to diet or medication.    FU INR in 1 week(s).    Luz Horton, PharmD

## 2019-09-19 ENCOUNTER — OFFICE VISIT (OUTPATIENT)
Dept: WOUND CARE | Facility: MEDICAL CENTER | Age: 79
End: 2019-09-19
Attending: PHYSICIAN ASSISTANT
Payer: MEDICARE

## 2019-09-19 VITALS
DIASTOLIC BLOOD PRESSURE: 53 MMHG | OXYGEN SATURATION: 94 % | RESPIRATION RATE: 18 BRPM | SYSTOLIC BLOOD PRESSURE: 119 MMHG | HEART RATE: 58 BPM | TEMPERATURE: 97.5 F

## 2019-09-19 DIAGNOSIS — S90.821D BLISTER OF RIGHT FOOT, SUBSEQUENT ENCOUNTER: ICD-10-CM

## 2019-09-19 DIAGNOSIS — I99.8 ISCHEMIC PAIN OF RIGHT FOOT: ICD-10-CM

## 2019-09-19 DIAGNOSIS — S61.411D SKIN TEAR OF RIGHT HAND WITHOUT COMPLICATION, SUBSEQUENT ENCOUNTER: ICD-10-CM

## 2019-09-19 DIAGNOSIS — I99.8 ISCHEMIC TOE: ICD-10-CM

## 2019-09-19 DIAGNOSIS — M79.671 ISCHEMIC PAIN OF RIGHT FOOT: ICD-10-CM

## 2019-09-19 PROCEDURE — 11042 DBRDMT SUBQ TIS 1ST 20SQCM/<: CPT

## 2019-09-19 PROCEDURE — 11042 DBRDMT SUBQ TIS 1ST 20SQCM/<: CPT | Performed by: NURSE PRACTITIONER

## 2019-09-19 ASSESSMENT — ENCOUNTER SYMPTOMS
SHORTNESS OF BREATH: 1
NERVOUS/ANXIOUS: 0
CLAUDICATION: 0
CHILLS: 0
VOMITING: 0
COUGH: 0
DEPRESSION: 0
CONSTIPATION: 0
FEVER: 0
NAUSEA: 0
DIARRHEA: 0

## 2019-09-19 NOTE — PROGRESS NOTES
"Provider Encounter- Full Thickness wound    HISTORY OF PRESENT ILLNESS                              START OF CARE IN CLINIC: 0912/2019                    REFERRING PROVIDER: Jeanine Peterson PA-C                 WOUND- Full thickness wound              LOCATION: R dorsal hand,                                   Ischemic wound of R distal hallux                                  Blister to right medial ankle                                  Right lateral heel partial-thickness wound              WOUND HISTORY: Pt reports scraping his hand on a drawer about a week ago and getting a skin tear. He has been putting abx ointment and band aid on it, says it isn't healing. He denies having any kind of lesion at the site prior to scraping it. Pt says he has had a painful blister on R hallux for \"several weeks\" that \"dried up but hurts like hell\". He has been referred to Dr Peters (vascular) and has undergone an arterial duplex at her office, but has not gone in for f/u yet.                PERTINENT PMH: PAD, hx of basal cell ca                  IMAGING:none              VASCULAR STUDIES: August 06 2019, and recently at Dr. Peters's office.                                   LAST  WOUND CULTURE:  DATE : na                                           DIABETES: no  MOST RECENT A1C:       TOBACCO USE: hx 1 PPD x 35yr, quit in 2010    That we have been treating in the clinic  9/19/2019 : Clinic visit with ELIDA Guzman.  Initial provider assessment.  Patient underwent an aortogram with angiogram atherectomy and angioplasty with Dr. Sera Peters on 9/14.  He presents today with stable eschar to his right distal plantar hallux.  He does have a new blister to his right medial ankle, and a new shallow open wound to his right lateral heel.  Besides his foot wounds, he has a skin tear to the dorsal aspect of his right hand that we have been treating in the clinic.  He states he is feeling well overall, however has quite a bit of " "pain in his toes.  DP and PT pulses found with Doppler, somewhat biphasic.       RESULTS:   FINDINGS   Right.    Stenosis of the common, profunda and superficial femoral  arteries .    Velocities are consistent with 50-75% stenosis.    atherosclerosis of the right popliteal artery with low amplitude monophasic    form.   Tibial arteries showed atherosclerosis with very low amplitude monophasic    forms.   Right peroneal artery seems occluded distally.     Left.    Left CFA not visualized due to central line.     Abdelrahman VIEYRA To   (Electronically Signed)   Final Date:      06 August 2019                     14:51        CLINIC XAVI: see vascular studies. Extremities cool, pulses not palpable. Monophasic with doppler          PAST MEDICAL HISTORY:   Past Medical History:   Diagnosis Date   • Anesthesia     \"needs more sedation\" (can sometimes hear MD during procedure)    • Anticoagulation monitoring, special range    • Aortic stenosis     mod AS- concern for low flow severe AS with rEFof 30%   • Arterial leg ulcer (HCC) 11/8/2018   • Atrial flutter ([facility]) 6/12/2019   • Basal cell carcinoma of left cheek 3/26/2015   • BPH 7/14/2009   • Bronchitis 12/25/2018   • CAD (coronary artery disease) 2/20/2014   • CATARACT     sanjuanita surgery complete   • CHF (congestive heart failure), NYHA class II, chronic, systolic ([facility]) E F30 in setting of atrial flutter 6/13/2019   • Chronic respiratory failure with hypoxia ([facility]) 5/8/2016   • CKD (chronic kidney disease) stage 4, GFR 15-29 ml/min ([facility]) 1/15/2010    end stage renal disease   • COPD (chronic obstructive pulmonary disease) ([facility])     wears 2.5 L o2 sometimes when he sleeps   • Detached retina    • Dialysis     [place],w,f [place]   • EMPHYSEMA    • Gout    • Heart burn     occas   • Heart murmur     maria esther lee cardiologist   • High cholesterol    • Hypertension    • Indigestion     occas   • Kidney cyst    • Leg pain, bilateral 8/10/2015   • On supplemental oxygen " therapy    • Peripheral vascular disease (HCC) 8/10/2015   • Pneumonia    • Primary insomnia 3/22/2018   • Proteinuria    • PVD (peripheral vascular disease) ([facility])    • Sleep apnea     O2 PER CANULA  AT NIGHT 2l/m       PAST SURGICAL HISTORY:   Past Surgical History:   Procedure Laterality Date   • AV FISTULA CREATION Right 8/6/2019    Procedure: CREATION, AV FISTULA -  RIGHT ARM  ,  and Ligation of Left Arm Fistula;  Surgeon: Sera Peters M.D.;  Location: [facility];  Service: General   • CATH PLACEMENT Right 8/6/2019    Procedure: INSERTION, CATHETER - PERMA ,;  Surgeon: Sera Peters M.D.;  Location: [facility];  Service: General   • JUSTIN  6/13/2019    Procedure: ECHOCARDIOGRAM, TRANSESOPHAGEAL;  Surgeon: Harvinder Hoang M.D.;  Location: [facility];  Service: Cardiac   • CARDIOVERSION  6/13/2019    Procedure: CARDIOVERSION;  Surgeon: Harvinder Hoang M.D.;  Location: [facility];  Service: Cardiac   • AV FISTULA CREATION Right 2/21/2019    Procedure: AV FISTULA CREATION - ARM;  Surgeon: David Cason M.D.;  Location: [facility];  Service: General   • FEMORAL ENDARTERECTOMY Left 1/25/2019    Procedure: FEMORAL ENDARTERECTOMY;  Surgeon: Dvaid Cason M.D.;  Location: [facility];  Service: General   • FEMORAL POPLITEAL BYPASS Left 1/25/2019    Procedure: LEFT FEMORAL POPLITEAL POLYTETRAFLUOROETHYLENE ePTFE(PROPATEN VASCULAR GRAFT) BYPASS;  Surgeon: David Cason M.D.;  Location: [facility];  Service: General   • ANGIOGRAM Left 1/25/2019    Procedure: LEFT LEG ANGIOGRAM;  Surgeon: David Cason M.D.;  Location: [facility];  Service: General   • ENDOSCOPY PROCEDURE  3/21/2018    Procedure: ENDOSCOPY PROCEDURE/UPPER;  Surgeon: Enrique Child D.O.;  Location: [facility];  Service: Gastroenterology   • COLONOSCOPY - ENDO  8/15/2016    Procedure: COLONOSCOPY - ENDO;   Surgeon: Mane Whatley M.D.;  Location: ENDOSCOPY [facility];  Service:    • GASTROSCOPY WITH BIOPSY  8/13/2016    Procedure: GASTROSCOPY WITH BIOPSY;  Surgeon: Jorge Leavitt M.D.;  Location: ENDOSCOPY [facility];  Service:    • RECOVERY  12/23/2015    Procedure: VASCULAR CASE-BLANKA-LEFT ARM FISTULOGRAM WITH ANGIOPLASTY;  Surgeon: [facility];  Location: SURGERY PRE-POST PROC UNIT [facility];  Service:    • RECOVERY  3/24/2015    Performed by [facility] at SURGERY PRE-POST PROC UNIT [facility]   • RECOVERY  7/29/2014    Performed by [facility] at SURGERY SAME DAY [facility]   • RECOVERY  3/24/2014    Performed by [facility] at [facility]ERY [facility]   • RECOVERY  12/17/2013    Performed by [facility] at SURGERY SAME DAY [facility]   • RECOVERY  7/2/2013    Performed by [facility] at SURGERY SAME DAY [facility]   • AV FISTULA THROMBOLYSIS  7/2/2013    Performed by David Cason M.D. at SURGERY [facility]   • RECOVERY  1/29/2013    Performed by [facility] at SURGERY SAME DAY [facility]   • RECOVERY  7/23/2012    Performed by SURGERY, IR-RECOVERY at SURGERY SAME DAY [facility]   • VITRECTOMY POSTERIOR  10/11/2011    Performed by NAHUM GE at SURGERY SAME DAY [facility]   • RECOVERY  8/12/2011    Performed by SURGERY, IR-RECOVERY at SURGERY SAME DAY [facility]   • VITRECTOMY POSTERIOR  1/18/2011    Performed by NAHUM GE at SURGERY SAME DAY [facility]   • SCLERAL BUCKLING  1/18/2011    Performed by NAHUM GE at SURGERY SAME DAY [facility]   • AV FISTULOGRAM  9/17/2010    Performed by DAVID CASON at SURGERY [facility]   • ANGIOPLASTY BALLOON  9/17/2010    Performed by DAVID CASON at SURGERY [facility]   • AV FISTULOGRAM  7/23/2010    Performed by DAVID CASON at SURGERY [facility]   • ANGIOPLASTY BALLOON  7/23/2010    Performed by DAVID CASON at [facility]   • AV FISTULA REVISION  2/19/2010    Performed by WILLIE HATCH at SURGERY [facility]   • AV FISTULA CREATION  2/12/2010    Performed by ALESHIA MOSCOSO at SURGERY [facility]   • CATARACT PHACO WITH IOL  4/8/08    Performed by STACEY PHILLIPS at SURGERY SAME DAY [facility]   • OTHER ORTHOPEDIC SURGERY  1998    right toe for facititis        MEDICATIONS:   Current Outpatient Medications   Medication   • albuterol 108 (90 Base) MCG/ACT Aero Soln inhalation aerosol   • zolpidem (AMBIEN) 10 MG Tab   • warfarin (COUMADIN) 5 MG Tab   • torsemide (DEMADEX) 10 MG tablet   • amiodarone (CORDARONE) 200 MG Tab   • tamsulosin (FLOMAX) 0.4 MG capsule   • metoprolol (LOPRESSOR) 50 MG Tab   • omeprazole (PRILOSEC) 40 MG delayed-release capsule   • calcium acetate (PHOS-LO) 667 MG Cap   • Fluticasone-Umeclidin-Vilant (TRELEGY ELLIPTA) 100-62.5-25 MCG/INH AEROSOL POWDER, BREATH ACTIVATED   • levalbuterol (XOPENEX) 1.25 MG/3ML Nebu Soln   • pravastatin (PRAVACHOL) 20 MG Tab     No current facility-administered medications for this visit.        ALLERGIES:  No Known Allergies      SOCIAL HISTORY:   Social History     Socioeconomic History   • Marital status:      Spouse name: Not on file   • Number of children: Not on file   • Years of education: Not on file   • Highest education level: Not on file   Occupational History   • Not on file   Social Needs   • Financial resource strain: Not on file   • Food insecurity:     Worry: Not on file     Inability: Not on file   • Transportation needs:     Medical: Not on file     Non-medical: Not on file   Tobacco Use   • Smoking status: Former Smoker     Packs/day: 1.00     Years: 40.00     Pack years: 40.00     Types: Cigarettes     Last attempt to quit: 1/1/2009     Years since quitting: 10.7   • Smokeless tobacco: Never Used   • Tobacco comment: 1 pk a day for 35 yrs, QUIT JAN 1 2010   Substance and Sexual Activity   • Alcohol use: No     Alcohol/week:  0.0 oz   • Drug use: No   • Sexual activity: Never     Partners: Female     Comment: , two sons, 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   Lifestyle   • Physical activity:     Days per week: Not on file     Minutes per session: Not on file   • Stress: Not on file   Relationships   • Social connections:     Talks on phone: Not on file     Gets together: Not on file     Attends [employer] service: Not on file     Active member of club or organization: Not on file     Attends meetings of clubs or organizations: Not on file     Relationship status: Not on file   • Intimate partner violence:     Fear of current or ex partner: Not on file     Emotionally abused: Not on file     Physically abused: Not on file     Forced sexual activity: Not on file   Other Topics Concern   • Not on file   Social History Narrative   • Not on file       FAMILY HISTORY:   Family History   Problem Relation Age of Onset   • Stroke Mother    • Hypertension Mother    • Lung Disease Father         Emphysema, resp failure   • Genitourinary () Problems Maternal Aunt         hematuria   • Hypertension Brother         REVIEW OF SYSTEMS:   Review of Systems   Constitutional: Negative for chills and fever.   Respiratory: Positive for shortness of breath. Negative for cough.         Short of breath with exertion   Cardiovascular: Positive for leg swelling. Negative for chest pain and claudication.        Denies claudication pain with ambulation, though reports chronic pain in his toes  Mild swelling in his right lower leg   Gastrointestinal: Negative for constipation, diarrhea, nausea and vomiting.   Genitourinary:        Voids very little  On dialysis 3 days/week   Musculoskeletal: Positive for joint pain.   Neurological:        Slight numbness in his feet   Psychiatric/Behavioral: Negative for depression. The patient is not nervous/anxious.        PHYSICAL EXAMINATION:   /53   Pulse (!) 58   Temp 36.4 °C (97.5 °F)   Resp 18   SpO2 94%   Physical  Exam   Constitutional: He is oriented to person, place, and time and well-developed, well-nourished, and in no distress.   HENT:   Head: Normocephalic.   Eyes: Pupils are equal, round, and reactive to light.   Cardiovascular:   Unable to palpate pedal pulses  Mono to biphasic by Doppler  Feet warm   Pulmonary/Chest: Effort normal.   Musculoskeletal: He exhibits edema.   Nonpitting edema of right lower extremity  Hemosiderin staining of both lower legs   Neurological: He is alert and oriented to person, place, and time.   Skin: Skin is warm.   Skin tear to back of right hand  Ischemic wound to right distal/plantar hallux  Blister to right medial foot  Shallow wound to right lateral heel  Refer to wound flowsheet and photos   Psychiatric: Mood, memory, affect and judgment normal.       Wound Assessment   Wound 09/12/19 Arterial Ulcer Toe (Comment which one) R distal hallux dry necrotic area, arterial (Active)   Wound Image   9/19/2019  2:00 PM   Site Assessment Black;Other (Comment) 9/19/2019  2:00 PM   Agata-wound Assessment Dry;Non-blanchable erythema;Purple 9/19/2019  2:00 PM   Margins Attached edges 9/19/2019  2:00 PM   Wound Length (cm) 1.8 cm 9/19/2019  2:00 PM   Wound Width (cm) 2 cm 9/19/2019  2:00 PM   Wound Depth (cm) 0 cm 9/13/2019  4:00 PM   Wound Surface Area (cm^2) 3.6 cm^2 9/19/2019  2:00 PM   Tunneling 0 cm 9/13/2019  4:00 PM   Undermining 0 cm 9/13/2019  4:00 PM   Closure Open to air 9/13/2019  4:00 PM   Drainage Amount None 9/19/2019  2:00 PM   Treatments Cleansed 9/19/2019  2:00 PM   Cleansing Normal Saline Irrigation 9/19/2019  2:00 PM   Dressing Options Other (Comments);Open to Air 9/19/2019  2:00 PM   Dressing Cleansing/Solutions 3% Betadine 9/13/2019  4:00 PM   WOUND NURSE ONLY - Odor None 9/19/2019  2:00 PM   WOUND NURSE ONLY - Pulses Not palpable 9/13/2019  4:00 PM   WOUND NURSE ONLY - Exposed Structures None 9/19/2019  2:00 PM   WOUND NURSE ONLY - Tissue Type and Percentage 100% dry black  eschar 9/19/2019  2:00 PM       Wound Skin Tear Hand R dorsal hand skin tear (Active)   Wound Image    9/19/2019  2:00 PM   Site Assessment Red;Yellow 9/19/2019  2:00 PM   Agata-wound Assessment Scar tissue 9/19/2019  2:00 PM   Margins Attached edges 9/19/2019  2:00 PM   Wound Length (cm) 1 cm 9/19/2019  2:00 PM   Wound Width (cm) 0.8 cm 9/19/2019  2:00 PM   Wound Depth (cm) 0.1 cm 9/19/2019  2:00 PM   Wound Surface Area (cm^2) 0.8 cm^2 9/19/2019  2:00 PM   Post Wound Length (cm) 1 cm 9/19/2019  2:00 PM    Post Wound Width (cm) 0.8 cm 9/19/2019  2:00 PM   Post Wound Depth (cm) 0.1 cm 9/19/2019  2:00 PM   Post Wound Surface Area (cm^2) 0.8 cm^2 9/19/2019  2:00 PM   Tunneling 0 cm 9/13/2019  4:00 PM   Undermining 0 cm 9/13/2019  4:00 PM   Closure Secondary intention 9/13/2019  4:00 PM   Drainage Amount Small 9/19/2019  2:00 PM   Drainage Description Serosanguineous 9/19/2019  2:00 PM   Non-staged Wound Description Full thickness 9/19/2019  2:00 PM   Treatments Cleansed;Pharmaceutical agent;Other (Comment) 9/19/2019  2:00 PM   Cleansing Normal Saline Irrigation 9/19/2019  2:00 PM   Periwound Protectant Skin Protectant wipes to Periwound 9/19/2019  2:00 PM   Dressing Options Nonadherent Contact Layer;Hydrofiber Silver;Nonadhesive Foam;Hypafix Tape 9/19/2019  2:00 PM   Dressing Cleansing/Solutions Normal Saline 9/13/2019  4:00 PM   Dressing Changed New 9/13/2019  4:00 PM   Dressing Status Clean;Dry;Intact 9/13/2019  4:00 PM   Dressing Change Frequency Every 72 hrs 9/13/2019  4:00 PM   WOUND NURSE ONLY - Odor None 9/19/2019  2:00 PM   WOUND NURSE ONLY - Exposed Structures None 9/19/2019  2:00 PM   WOUND NURSE ONLY - Tissue Type and Percentage Pre: 80% red, 20% yellow 9/19/2019  2:00 PM       Wound 09/19/19 Heel Right Lateral Heel (Active)   Wound Image   9/19/2019  2:00 PM   Site Assessment Red;Yellow 9/19/2019  2:00 PM   Agata-wound Assessment Non-blanchable erythema 9/19/2019  2:00 PM   Margins Attached edges 9/19/2019   2:00 PM   Wound Length (cm) 0.4 cm 9/19/2019  2:00 PM   Wound Width (cm) 0.6 cm 9/19/2019  2:00 PM   Wound Depth (cm) 0.1 cm 9/19/2019  2:00 PM   Wound Surface Area (cm^2) 0.24 cm^2 9/19/2019  2:00 PM   Drainage Amount Scant 9/19/2019  2:00 PM   Drainage Description Serosanguineous 9/19/2019  2:00 PM   Non-staged Wound Description Full thickness 9/19/2019  2:00 PM   Treatments Cleansed 9/19/2019  2:00 PM   Cleansing Normal Saline Irrigation 9/19/2019  2:00 PM   Periwound Protectant Skin Protectant wipes to Periwound 9/19/2019  2:00 PM   Dressing Options Nonadhesive Foam;Hypafix Tape 9/19/2019  2:00 PM   WOUND NURSE ONLY - Odor None 9/19/2019  2:00 PM   WOUND NURSE ONLY - Exposed Structures None 9/19/2019  2:00 PM   WOUND NURSE ONLY - Tissue Type and Percentage Pre: 60% red, 40% yellow 9/19/2019  2:00 PM          Pre-debridement Photo    Right dorsal hand wound, pre-debridement    Right distal/plantar toe, no debridement    Right lateral heel, no debridement    Medial ankle blister, no debridement          PROCEDURE: Excisional debridement of right dorsal hand wound  -2% viscous lidocaine applied topically to wound bed for approximately 5 minutes prior to debridement  -  4m curette used to debride wound bed.  Excisional debridement was performed to remove devitalized tissue until healthy, bleeding tissue was visualized.   Entire surface of wound, 0.8 cm2, debrided  Tissue debrided into the subcutaneous layer.   -Bleeding controlled with manual pressure   -Wound care completed by Allegra Smith RN -refer to flowsheet    -Wound care to all other wounds also completed byAllegra -refer to flowsheet    Post-debridement Photo        PATIENT EDUCATION  -Advised to go to ER for any increased redness, swelling, drainage or odor, or if patient develops fever, chills, nausea or vomiting.  -Importance of adequate nutrition for wound healing  -Increase protein intake (unless contraindicated by renal status)    ASSESSMENT AND PLAN:      1. Skin tear of right hand without complication, subsequent encounter  Comments: Trauma from bumping into bedside tray while in the hospital    9/19: Initial provider assessment.  Wound area has decreased since last assessment, crusting around the edges, slough to wound bed.  -Excisional debridement of wound in clinic today, medically necessary to promote wound healing.  -Patient to return to clinic weekly for assessment and debridement  -Patient to change dressing 1-2 times per week in between clinic visits   Wound care: Nonadherent contact layer to wound bed to minimize trauma with dressing removal, silver Hydrofiber to manage exudate and bioburden, foam cover dressing    2. Ischemic toe    9/19: Stable dry eschar to distal right hallux.  No drainage,  no fluctuance.  Periwound erythema    3. Blister of right foot, subsequent encounter    9/19: Intact blister noted to medial ankle, partial thickness wound to right lateral foot-likely broken blister  -No debridement required for either these wounds  -Monitor at each clinic visit   Wound care: Foam cover dressings to manage edema and to protect underlying wound    4. Ischemic pain of right foot    9/19: Patient complains of constant pain to toes of right foot  -Angiogram with atherectomy and angioplasty on 9/14.  Pain should improve with better perfusion.    Please note that this dictation was created using voice recognition software. I have worked with technical experts from [employer] to optimize the interface.  I have made every reasonable attempt to correct obvious errors, but there may be errors of grammar and possibly content that I did not discover before finalizing the note.

## 2019-09-19 NOTE — PATIENT INSTRUCTIONS
Keep dressing clean and dry and cover while bathing. Only change dressing if over saturated, soiled or its falling off.     Should you experience any significant changes in your wound(s) such as infection (redness, swelling, localized heat, increased pain, fever >101 F, chills) or have any questions regarding your home care instructions, please contact the wound center [phone]. If after hours, contact your primary care physician or go the hospital emergency room.

## 2019-09-20 DIAGNOSIS — E78.5 HYPERLIPIDEMIA, UNSPECIFIED HYPERLIPIDEMIA TYPE: ICD-10-CM

## 2019-09-20 RX ORDER — PRAVASTATIN SODIUM 20 MG
TABLET ORAL
Qty: 90 TAB | Refills: 3 | Status: SHIPPED | OUTPATIENT
Start: 2019-09-20 | End: 2019-09-26

## 2019-09-23 DIAGNOSIS — I10 ESSENTIAL HYPERTENSION: Primary | ICD-10-CM

## 2019-09-23 RX ORDER — METOPROLOL TARTRATE 50 MG/1
50 TABLET, FILM COATED ORAL 2 TIMES DAILY
Qty: 180 TAB | Refills: 3 | Status: SHIPPED | OUTPATIENT
Start: 2019-09-23 | End: 2019-10-10 | Stop reason: SDUPTHER

## 2019-09-24 ENCOUNTER — OFFICE VISIT (OUTPATIENT)
Dept: WOUND CARE | Facility: MEDICAL CENTER | Age: 79
End: 2019-09-24
Attending: PHYSICIAN ASSISTANT
Payer: MEDICARE

## 2019-09-24 VITALS
RESPIRATION RATE: 16 BRPM | SYSTOLIC BLOOD PRESSURE: 120 MMHG | DIASTOLIC BLOOD PRESSURE: 57 MMHG | HEART RATE: 60 BPM | OXYGEN SATURATION: 92 % | TEMPERATURE: 98.3 F

## 2019-09-24 DIAGNOSIS — I99.8 ISCHEMIC PAIN OF RIGHT FOOT: ICD-10-CM

## 2019-09-24 DIAGNOSIS — I99.8 ISCHEMIC TOE: ICD-10-CM

## 2019-09-24 DIAGNOSIS — S90.821D BLISTER OF RIGHT FOOT, SUBSEQUENT ENCOUNTER: ICD-10-CM

## 2019-09-24 DIAGNOSIS — M79.671 ISCHEMIC PAIN OF RIGHT FOOT: ICD-10-CM

## 2019-09-24 DIAGNOSIS — S61.411D SKIN TEAR OF RIGHT HAND WITHOUT COMPLICATION, SUBSEQUENT ENCOUNTER: ICD-10-CM

## 2019-09-24 PROCEDURE — 11042 DBRDMT SUBQ TIS 1ST 20SQCM/<: CPT | Performed by: NURSE PRACTITIONER

## 2019-09-24 PROCEDURE — 11042 DBRDMT SUBQ TIS 1ST 20SQCM/<: CPT

## 2019-09-24 ASSESSMENT — ENCOUNTER SYMPTOMS
NERVOUS/ANXIOUS: 0
FEVER: 0
DIARRHEA: 0
NAUSEA: 0
COUGH: 0
SHORTNESS OF BREATH: 1
VOMITING: 0
CLAUDICATION: 0
CONSTIPATION: 0
CHILLS: 0
DEPRESSION: 0

## 2019-09-24 NOTE — WOUND TEAM
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, arterial (Active)   Wound Image   9/24/2019 10:52 AM   Site Assessment Black 9/24/2019 10:52 AM   Agata-wound Assessment Non-blanchable erythema;Dry 9/24/2019 10:52 AM   Margins Attached edges 9/19/2019  2:00 PM   Wound Length (cm) 1.8 cm 9/19/2019  2:00 PM   Wound Width (cm) 2 cm 9/19/2019  2:00 PM   Wound Depth (cm) 0 cm 9/13/2019  4:00 PM   Wound Surface Area (cm^2) 3.6 cm^2 9/19/2019  2:00 PM   Tunneling 0 cm 9/13/2019  4:00 PM   Undermining 0 cm 9/13/2019  4:00 PM   Closure Open to air 9/13/2019  4:00 PM   Drainage Amount None 9/24/2019 10:52 AM   Treatments Cleansed;Pharmaceutical agent 9/24/2019 10:52 AM   Cleansing Normal Saline Irrigation 9/24/2019 10:52 AM   Dressing Options Other (Comments);Open to Air 9/19/2019  2:00 PM   Dressing Cleansing/Solutions 3% Betadine 9/13/2019  4:00 PM   WOUND NURSE ONLY - Odor None 9/24/2019 10:52 AM   WOUND NURSE ONLY - Pulses Not palpable 9/13/2019  4:00 PM   WOUND NURSE ONLY - Exposed Structures None 9/24/2019 10:52 AM   WOUND NURSE ONLY - Tissue Type and Percentage 100% dry, black eschar 9/24/2019 10:52 AM       Wound Skin Tear Hand R dorsal hand skin tear (Active)   Wound Image    9/24/2019 10:52 AM   Site Assessment Red 9/24/2019 10:52 AM   Agata-wound Assessment Fragile 9/24/2019 10:52 AM   Margins Attached edges 9/24/2019 10:52 AM   Wound Length (cm) 0.6 cm 9/24/2019 10:52 AM   Wound Width (cm) 0.6 cm 9/24/2019 10:52 AM   Wound Depth (cm) 0.1 cm 9/24/2019 10:52 AM   Wound Surface Area (cm^2) 0.36 cm^2 9/24/2019 10:52 AM   Post Wound Length (cm) 0.7 cm 9/24/2019 10:52 AM    Post Wound Width (cm) 0.7 cm 9/24/2019 10:52 AM   Post Wound Depth (cm) 0.1 cm 9/24/2019 10:52 AM   Post Wound Surface Area (cm^2) 0.49 cm^2 9/24/2019 10:52 AM   Tunneling 0 cm 9/13/2019  4:00 PM   Undermining 0 cm 9/13/2019  4:00 PM   Closure  Secondary intention 9/24/2019 10:52 AM   Drainage Amount Moderate 9/24/2019 10:52 AM   Drainage Description Serosanguineous 9/24/2019 10:52 AM   Non-staged Wound Description Full thickness 9/24/2019 10:52 AM   Treatments Cleansed;Pharmaceutical agent;Other (Comment) 9/24/2019 10:52 AM   Cleansing Normal Saline Irrigation 9/24/2019 10:52 AM   Periwound Protectant Skin Protectant wipes to Periwound 9/24/2019 10:52 AM   Dressing Options Nonadherent Contact Layer;Hydrofiber Silver;Adhesive Foam 9/24/2019 10:52 AM   Dressing Cleansing/Solutions Normal Saline 9/24/2019 10:52 AM   Dressing Changed New 9/13/2019  4:00 PM   Dressing Status Clean;Dry;Intact 9/13/2019  4:00 PM   Dressing Change Frequency Every 72 hrs 9/13/2019  4:00 PM   WOUND NURSE ONLY - Odor None 9/24/2019 10:52 AM   WOUND NURSE ONLY - Exposed Structures None 9/24/2019 10:52 AM   WOUND NURSE ONLY - Tissue Type and Percentage Pre debridement: 100% moist red 9/24/2019 10:52 AM       Wound 09/19/19 Heel Right Lateral Heel (Active)   Wound Image    9/24/2019 10:52 AM   Site Assessment Red;Yellow 9/24/2019 10:52 AM   Agata-wound Assessment Edema;Fragile 9/24/2019 10:52 AM   Margins Attached edges 9/24/2019 10:52 AM   Wound Length (cm) 0.5 cm 9/24/2019 10:52 AM   Wound Width (cm) 0.6 cm 9/24/2019 10:52 AM   Wound Depth (cm) 0.1 cm 9/24/2019 10:52 AM   Wound Surface Area (cm^2) 0.3 cm^2 9/24/2019 10:52 AM   Post Wound Length (cm) 0.7 cm 9/24/2019 10:52 AM    Post Wound Width (cm) 0.6 cm 9/24/2019 10:52 AM   Post Wound Depth (cm) 0.1 cm 9/24/2019 10:52 AM   Post Wound Surface Area (cm^2) 0.42 cm^2 9/24/2019 10:52 AM   Drainage Amount Moderate 9/24/2019 10:52 AM   Drainage Description Serosanguineous 9/24/2019 10:52 AM   Non-staged Wound Description Full thickness 9/24/2019 10:52 AM   Treatments Cleansed;Pharmaceutical agent;Other (Comment) 9/24/2019 10:52 AM   Cleansing Normal Saline Irrigation 9/24/2019 10:52 AM   Periwound Protectant Skin Protectant wipes to  Periwound 9/24/2019 10:52 AM   Dressing Options Hydrofiber Silver;Nonadhesive Foam;Hypafix Tape 9/24/2019 10:52 AM   WOUND NURSE ONLY - Odor None 9/24/2019 10:52 AM   WOUND NURSE ONLY - Exposed Structures None 9/24/2019 10:52 AM   WOUND NURSE ONLY - Tissue Type and Percentage Pre debridement: 90% red, 10% yellow 9/24/2019 10:52 AM       Wound 09/24/19 Ankle Right medial ankle superior  (Active)   Wound Image    9/24/2019 10:52 AM   Site Assessment Other (Comment) 9/24/2019 10:52 AM   Agata-wound Assessment Edema;Fragile;Pink 9/24/2019 10:52 AM   Post Wound Length (cm) 2 cm 9/24/2019 10:52 AM    Post Wound Width (cm) 1.6 cm 9/24/2019 10:52 AM   Post Wound Depth (cm) 0 cm 9/24/2019 10:52 AM   Post Wound Surface Area (cm^2) 3.2 cm^2 9/24/2019 10:52 AM   Closure Secondary intention 9/24/2019 10:52 AM   Drainage Amount None 9/24/2019 10:52 AM   Non-staged Wound Description Full thickness 9/24/2019 10:52 AM   Treatments Cleansed;Pharmaceutical agent;Other (Comment) 9/24/2019 10:52 AM   Cleansing Normal Saline Irrigation 9/24/2019 10:52 AM   Periwound Protectant Skin Protectant wipes to Periwound 9/24/2019 10:52 AM   Dressing Options Hydrofiber Silver;Adhesive Foam 9/24/2019 10:52 AM   Dressing Cleansing/Solutions Normal Saline 9/24/2019 10:52 AM   WOUND NURSE ONLY - Odor None 9/24/2019 10:52 AM   WOUND NURSE ONLY - Exposed Structures None 9/24/2019 10:52 AM   WOUND NURSE ONLY - Tissue Type and Percentage Pre debridement: 100% dried blister 9/24/2019 10:52 AM       Wound 09/24/19 Ankle Right medial ankle inferior (Active)   Site Assessment Other (Comment) 9/24/2019 10:52 AM   Agata-wound Assessment Edema;Fragile;Pink 9/24/2019 10:52 AM   Post Wound Length (cm) 0.6 cm 9/24/2019 10:52 AM    Post Wound Width (cm) 1.5 cm 9/24/2019 10:52 AM   Post Wound Depth (cm) 0.1 cm 9/24/2019 10:52 AM   Post Wound Surface Area (cm^2) 0.9 cm^2 9/24/2019 10:52 AM   Closure Secondary intention 9/24/2019 10:52 AM   Drainage Amount None 9/24/2019  10:52 AM   Non-staged Wound Description Full thickness 9/24/2019 10:52 AM   Treatments Cleansed;Pharmaceutical agent;Other (Comment) 9/24/2019 10:52 AM   Cleansing Normal Saline Irrigation 9/24/2019 10:52 AM   Periwound Protectant Skin Protectant wipes to Periwound 9/24/2019 10:52 AM   Dressing Options Hydrofiber Silver;Adhesive Foam 9/24/2019 10:52 AM   Dressing Cleansing/Solutions Normal Saline 9/24/2019 10:52 AM   WOUND NURSE ONLY - Odor None 9/24/2019 10:52 AM   WOUND NURSE ONLY - Exposed Structures None 9/24/2019 10:52 AM   WOUND NURSE ONLY - Tissue Type and Percentage Pre debridement 100% dried blister 9/24/2019 10:52 AM

## 2019-09-25 ENCOUNTER — HOSPITAL ENCOUNTER (OUTPATIENT)
Dept: LAB | Facility: MEDICAL CENTER | Age: 79
End: 2019-09-25
Attending: NURSE PRACTITIONER
Payer: MEDICARE

## 2019-09-25 DIAGNOSIS — I48.92 ATRIAL FLUTTER, UNSPECIFIED TYPE (HCC): ICD-10-CM

## 2019-09-25 LAB
INR PPP: 2.38 (ref 0.87–1.13)
PROTHROMBIN TIME: 26.8 SEC (ref 12–14.6)

## 2019-09-25 PROCEDURE — 85610 PROTHROMBIN TIME: CPT

## 2019-09-25 PROCEDURE — 36415 COLL VENOUS BLD VENIPUNCTURE: CPT

## 2019-09-25 NOTE — PROGRESS NOTES
"Provider Encounter- Full Thickness wound    HISTORY OF PRESENT ILLNESS                              START OF CARE IN CLINIC: 0912/2019                    REFERRING PROVIDER: Jeanine Peterson PA-C                 WOUND- Full thickness wound              LOCATION: R dorsal hand,                                   Ischemic wound of R distal hallux                                  Blister to right medial ankle                                  Right lateral heel partial-thickness wound              WOUND HISTORY: Pt reports scraping his hand on a drawer about a week ago and getting a skin tear. He has been putting abx ointment and band aid on it, says it isn't healing. He denies having any kind of lesion at the site prior to scraping it. Pt says he has had a painful blister on R hallux for \"several weeks\" that \"dried up but hurts like hell\". He has been referred to Dr Peters (vascular) and has undergone an arterial duplex at her office, but has not gone in for f/u yet.                PERTINENT PMH: PAD, hx of basal cell ca                  IMAGING:none              VASCULAR STUDIES: August 06 2019, and recently at Dr. Peters's office.                                   LAST  WOUND CULTURE:  DATE : na                                           DIABETES: no  MOST RECENT A1C:       TOBACCO USE: hx 1 PPD x 35yr, quit in 2010 9/19/2019 : Clinic visit with ELIDA Guzman.  Initial provider assessment.  Patient underwent an aortogram with angiogram atherectomy and angioplasty with Dr. Sera Peters on 9/14.  He presents today with stable eschar to his right distal plantar hallux.  He does have a new blister to his right medial ankle, and a new shallow open wound to his right lateral heel.  Besides his foot wounds, he has a skin tear to the dorsal aspect of his right hand that we have been treating in the clinic.  He states he is feeling well overall, however has quite a bit of pain in his toes.  DP and PT pulses found " "with Doppler, somewhat biphasic.    9/24/2019 : Clinic visit with ELIDA Guzman. Patient is feeling well, denies fevers, chills, nausea, or vomiting.  The wounds to his dorsal hand, right medial ankle, and right lateral heel have all improved, area has decreased.  The eschar to his distal right hallux remains stable.  Right DP and PT pulses Mono to biphasic with Doppler.  He is supposed to follow-up with Dr. Peters in her clinic tomorrow       RESULTS:   FINDINGS   Right.    Stenosis of the common, profunda and superficial femoral  arteries .    Velocities are consistent with 50-75% stenosis.    atherosclerosis of the right popliteal artery with low amplitude monophasic    form.   Tibial arteries showed atherosclerosis with very low amplitude monophasic    forms.   Right peroneal artery seems occluded distally.     Left.    Left CFA not visualized due to central line.     Abdelrahman VIEYRA To   (Electronically Signed)   Final Date:      06 August 2019                     14:51                  PAST MEDICAL HISTORY:   Past Medical History:   Diagnosis Date   • Anesthesia     \"needs more sedation\" (can sometimes hear MD during procedure)    • Anticoagulation monitoring, special range    • Aortic stenosis     mod AS- concern for low flow severe AS with rEFof 30%   • Arterial leg ulcer (HCC) 11/8/2018   • Atrial flutter ([facility]) 6/12/2019   • Basal cell carcinoma of left cheek 3/26/2015   • BPH 7/14/2009   • Bronchitis 12/25/2018   • CAD (coronary artery disease) 2/20/2014   • CATARACT     sanjuanita surgery complete   • CHF (congestive heart failure), NYHA class II, chronic, systolic ([facility]) E F30 in setting of atrial flutter 6/13/2019   • Chronic respiratory failure with hypoxia ([facility]) 5/8/2016   • CKD (chronic kidney disease) stage 4, GFR 15-29 ml/min ([facility]) 1/15/2010    end stage renal disease   • COPD (chronic obstructive pulmonary disease) ([facility])     wears 2.5 L o2 sometimes when he sleeps   • Detached retina    • Dialysis "     m,w,f [facility]   • EMPHYSEMA    • Gout    • Heart burn     occas   • Heart murmur     maria esther lee cardiologist   • High cholesterol    • Hypertension    • Indigestion     occas   • Kidney cyst    • Leg pain, bilateral 8/10/2015   • On supplemental oxygen therapy    • Peripheral vascular disease (HCC) 8/10/2015   • Pneumonia    • Primary insomnia 3/22/2018   • Proteinuria    • PVD (peripheral vascular disease) (HCC)    • Sleep apnea     O2 PER CANULA  AT NIGHT 2l/m       PAST SURGICAL HISTORY:   Past Surgical History:   Procedure Laterality Date   • AV FISTULA CREATION Right 8/6/2019    Procedure: CREATION, AV FISTULA -  RIGHT ARM  ,  and Ligation of Left Arm Fistula;  Surgeon: Sera Peters M.D.;  Location: [facility];  Service: General   • CATH PLACEMENT Right 8/6/2019    Procedure: INSERTION, CATHETER - PERMA ,;  Surgeon: Sera Peters M.D.;  Location: [facility];  Service: General   • JUSTIN  6/13/2019    Procedure: ECHOCARDIOGRAM, TRANSESOPHAGEAL;  Surgeon: Harvinder Hoang M.D.;  Location: [facility];  Service: Cardiac   • CARDIOVERSION  6/13/2019    Procedure: CARDIOVERSION;  Surgeon: Harvinder Hoang M.D.;  Location: [facility];  Service: Cardiac   • AV FISTULA CREATION Right 2/21/2019    Procedure: AV FISTULA CREATION - ARM;  Surgeon: David Cason M.D.;  Location: [facility];  Service: General   • FEMORAL ENDARTERECTOMY Left 1/25/2019    Procedure: FEMORAL ENDARTERECTOMY;  Surgeon: David Cason M.D.;  Location: [facility];  Service: General   • FEMORAL POPLITEAL BYPASS Left 1/25/2019    Procedure: LEFT FEMORAL POPLITEAL POLYTETRAFLUOROETHYLENE ePTFE(PROPATEN VASCULAR GRAFT) BYPASS;  Surgeon: David Cason M.D.;  Location: [facility];  Service: General   • ANGIOGRAM Left 1/25/2019    Procedure: LEFT LEG ANGIOGRAM;  Surgeon: David Cason M.D.;  Location: SURGERY  [facility];  Service: General   • ENDOSCOPY PROCEDURE  3/21/2018    Procedure: ENDOSCOPY PROCEDURE/UPPER;  Surgeon: Enrique Child D.O.;  Location: SURGERY [facility];  Service: Gastroenterology   • COLONOSCOPY - ENDO  8/15/2016    Procedure: COLONOSCOPY - ENDO;  Surgeon: Mane Whatley M.D.;  Location: ENDOSCOPY [facility];  Service:    • GASTROSCOPY WITH BIOPSY  8/13/2016    Procedure: GASTROSCOPY WITH BIOPSY;  Surgeon: Jorge Leavitt M.D.;  Location: ENDOSCOPY [facility];  Service:    • RECOVERY  12/23/2015    Procedure: VASCULAR CASE-CASON-LEFT ARM FISTULOGRAM WITH ANGIOPLASTY;  Surgeon: Recoveryonly Surgery;  Location: SURGERY PRE-POST PROC UNIT [facility];  Service:    • RECOVERY  3/24/2015    Performed by Recoveryonly Surgery at SURGERY PRE-POST PROC UNIT [facility]   • RECOVERY  7/29/2014    Performed by Ir-Recovery Surgery at SURGERY SAME DAY [facility]   • RECOVERY  3/24/2014    Performed by Ir-Recovery Surgery at [facility]   • RECOVERY  12/17/2013    Performed by Ir-Recovery Surgery at SURGERY SAME DAY [facility]   • RECOVERY  7/2/2013    Performed by Ir-Recovery Surgery at SURGERY SAME DAY [facility]   • AV FISTULA THROMBOLYSIS  7/2/2013    Performed by David Cason M.D. at SURGERY [facility]   • RECOVERY  1/29/2013    Performed by Ir-Recovery Surgery at SURGERY SAME DAY [facility]   • RECOVERY  7/23/2012    Performed by SURGERY, IR-RECOVERY at SURGERY SAME DAY [facility]   • VITRECTOMY POSTERIOR  10/11/2011    Performed by NAHUM GE at SURGERY SAME DAY [facility]   • RECOVERY  8/12/2011    Performed by SURGERY, IR-RECOVERY at SURGERY SAME DAY [facility]   • VITRECTOMY POSTERIOR  1/18/2011    Performed by NAHUM GE at SURGERY SAME DAY [facility]   • SCLERAL BUCKLING  1/18/2011    Performed by NAHUM GE at SURGERY SAME DAY [facility]   • AV FISTULOGRAM  9/17/2010    Performed by DAVID CASON at SURGERY [facility]   •  ANGIOPLASTY BALLOON  9/17/2010    Performed by ALESHIA MOSCOSO at SURGERY [facility]   • AV FISTULOGRAM  7/23/2010    Performed by ALESHIA MOSCOSO at SURGERY [facility]   • ANGIOPLASTY BALLOON  7/23/2010    Performed by ALESHIA MOSCOSO at SURGERY [facility]   • AV FISTULA REVISION  2/19/2010    Performed by WILLIE HATCH at SURGERY [facility]   • AV FISTULA CREATION  2/12/2010    Performed by ALESHIA MOSCOSO at SURGERY [facility]   • CATARACT PHACO WITH IOL  4/8/08    Performed by STACEY PHILLIPS at SURGERY SAME DAY [facility]   • OTHER ORTHOPEDIC SURGERY  1998    right toe for facititis        MEDICATIONS:   Current Outpatient Medications   Medication   • metoprolol (LOPRESSOR) 50 MG Tab   • pravastatin (PRAVACHOL) 20 MG Tab   • albuterol 108 (90 Base) MCG/ACT Aero Soln inhalation aerosol   • zolpidem (AMBIEN) 10 MG Tab   • warfarin (COUMADIN) 5 MG Tab   • torsemide (DEMADEX) 10 MG tablet   • amiodarone (CORDARONE) 200 MG Tab   • tamsulosin (FLOMAX) 0.4 MG capsule   • omeprazole (PRILOSEC) 40 MG delayed-release capsule   • calcium acetate (PHOS-LO) 667 MG Cap   • Fluticasone-Umeclidin-Vilant (TRELEGY ELLIPTA) 100-62.5-25 MCG/INH AEROSOL POWDER, BREATH ACTIVATED   • levalbuterol (XOPENEX) 1.25 MG/3ML Nebu Soln   • pravastatin (PRAVACHOL) 20 MG Tab     No current facility-administered medications for this visit.        ALLERGIES:  No Known Allergies      SOCIAL HISTORY:   Social History     Socioeconomic History   • Marital status:      Spouse name: Not on file   • Number of children: Not on file   • Years of education: Not on file   • Highest education level: Not on file   Occupational History   • Not on file   Social Needs   • Financial resource strain: Not on file   • Food insecurity:     Worry: Not on file     Inability: Not on file   • Transportation needs:     Medical: Not on file     Non-medical: Not on file   Tobacco Use   • Smoking status: Former Smoker      Packs/day: 1.00     Years: 40.00     Pack years: 40.00     Types: Cigarettes     Last attempt to quit: 1/1/2009     Years since quitting: 10.7   • Smokeless tobacco: Never Used   • Tobacco comment: 1 pk a day for 35 yrs, QUIT JAN 1 2010   Substance and Sexual Activity   • Alcohol use: No     Alcohol/week: 0.0 oz   • Drug use: No   • Sexual activity: Never     Partners: Female     Comment: , two sons, 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   Lifestyle   • Physical activity:     Days per week: Not on file     Minutes per session: Not on file   • Stress: Not on file   Relationships   • Social connections:     Talks on phone: Not on file     Gets together: Not on file     Attends Yazidism service: Not on file     Active member of club or organization: Not on file     Attends meetings of clubs or organizations: Not on file     Relationship status: Not on file   • Intimate partner violence:     Fear of current or ex partner: Not on file     Emotionally abused: Not on file     Physically abused: Not on file     Forced sexual activity: Not on file   Other Topics Concern   • Not on file   Social History Narrative   • Not on file       FAMILY HISTORY:   Family History   Problem Relation Age of Onset   • Stroke Mother    • Hypertension Mother    • Lung Disease Father         Emphysema, resp failure   • Genitourinary () Problems Maternal Aunt         hematuria   • Hypertension Brother         REVIEW OF SYSTEMS:   Review of Systems   Constitutional: Negative for chills and fever.   Respiratory: Positive for shortness of breath. Negative for cough.         Short of breath with exertion   Cardiovascular: Positive for leg swelling. Negative for chest pain and claudication.        Denies claudication pain with ambulation, though reports chronic pain in his toes  Mild swelling in his right lower leg   Gastrointestinal: Negative for constipation, diarrhea, nausea and vomiting.   Genitourinary:        Voids very little  On dialysis 3  days/week   Musculoskeletal: Positive for joint pain.   Neurological:        Slight numbness in his feet   Psychiatric/Behavioral: Negative for depression. The patient is not nervous/anxious.        PHYSICAL EXAMINATION:   /57   Pulse 60   Temp 36.8 °C (98.3 °F) (Temporal)   Resp 16   SpO2 92%   Physical Exam   Constitutional: He is oriented to person, place, and time and well-developed, well-nourished, and in no distress.   HENT:   Head: Normocephalic.   Eyes: Pupils are equal, round, and reactive to light.   Cardiovascular:   Unable to palpate pedal pulses  Mono to biphasic by Doppler  Feet warm   Pulmonary/Chest: Effort normal.   Musculoskeletal: He exhibits edema.   Nonpitting edema of right lower extremity  Hemosiderin staining of both lower legs   Neurological: He is alert and oriented to person, place, and time.   Skin: Skin is warm.   Skin tear to back of right hand  Ischemic wound to right distal/plantar hallux  Blister to right medial foot  Shallow wound to right lateral heel  Refer to wound flowsheet and photos   Psychiatric: Mood, memory, affect and judgment normal.       Wound Assessment      Wound 09/12/19 Arterial Ulcer Toe (Comment which one) R distal hallux dry necrotic area, arterial (Active)   Wound Image   9/24/2019 10:52 AM   Site Assessment Black 9/24/2019 10:52 AM   Agata-wound Assessment Non-blanchable erythema;Dry 9/24/2019 10:52 AM   Margins Attached edges 9/19/2019  2:00 PM   Wound Length (cm) 1.8 cm 9/19/2019  2:00 PM   Wound Width (cm) 2 cm 9/19/2019  2:00 PM   Wound Depth (cm) 0 cm 9/13/2019  4:00 PM   Wound Surface Area (cm^2) 3.6 cm^2 9/19/2019  2:00 PM   Tunneling 0 cm 9/13/2019  4:00 PM   Undermining 0 cm 9/13/2019  4:00 PM   Closure Open to air 9/13/2019  4:00 PM   Drainage Amount None 9/24/2019 10:52 AM   Treatments Cleansed;Pharmaceutical agent 9/24/2019 10:52 AM   Cleansing Normal Saline Irrigation 9/24/2019 10:52 AM   Dressing Options Other (Comments);Open to Air  9/19/2019  2:00 PM   Dressing Cleansing/Solutions 3% Betadine 9/13/2019  4:00 PM   WOUND NURSE ONLY - Odor None 9/24/2019 10:52 AM   WOUND NURSE ONLY - Pulses Not palpable 9/13/2019  4:00 PM   WOUND NURSE ONLY - Exposed Structures None 9/24/2019 10:52 AM   WOUND NURSE ONLY - Tissue Type and Percentage 100% dry, black eschar 9/24/2019 10:52 AM       Wound Skin Tear Hand R dorsal hand skin tear (Active)   Wound Image    9/24/2019 10:52 AM   Site Assessment Red 9/24/2019 10:52 AM   Agata-wound Assessment Fragile 9/24/2019 10:52 AM   Margins Attached edges 9/24/2019 10:52 AM   Wound Length (cm) 0.6 cm 9/24/2019 10:52 AM   Wound Width (cm) 0.6 cm 9/24/2019 10:52 AM   Wound Depth (cm) 0.1 cm 9/24/2019 10:52 AM   Wound Surface Area (cm^2) 0.36 cm^2 9/24/2019 10:52 AM   Post Wound Length (cm) 0.7 cm 9/24/2019 10:52 AM    Post Wound Width (cm) 0.7 cm 9/24/2019 10:52 AM   Post Wound Depth (cm) 0.1 cm 9/24/2019 10:52 AM   Post Wound Surface Area (cm^2) 0.49 cm^2 9/24/2019 10:52 AM   Tunneling 0 cm 9/13/2019  4:00 PM   Undermining 0 cm 9/13/2019  4:00 PM   Closure Secondary intention 9/24/2019 10:52 AM   Drainage Amount Moderate 9/24/2019 10:52 AM   Drainage Description Serosanguineous 9/24/2019 10:52 AM   Non-staged Wound Description Full thickness 9/24/2019 10:52 AM   Treatments Cleansed;Pharmaceutical agent;Other (Comment) 9/24/2019 10:52 AM   Cleansing Normal Saline Irrigation 9/24/2019 10:52 AM   Periwound Protectant Skin Protectant wipes to Periwound 9/24/2019 10:52 AM   Dressing Options Nonadherent Contact Layer;Hydrofiber Silver;Adhesive Foam 9/24/2019 10:52 AM   Dressing Cleansing/Solutions Normal Saline 9/24/2019 10:52 AM   Dressing Changed New 9/13/2019  4:00 PM   Dressing Status Clean;Dry;Intact 9/13/2019  4:00 PM   Dressing Change Frequency Every 72 hrs 9/13/2019  4:00 PM   WOUND NURSE ONLY - Odor None 9/24/2019 10:52 AM   WOUND NURSE ONLY - Exposed Structures None 9/24/2019 10:52 AM   WOUND NURSE ONLY - Tissue Type  and Percentage Pre debridement: 100% moist red 9/24/2019 10:52 AM       Wound 09/19/19 Heel Right Lateral Heel (Active)   Wound Image    9/24/2019 10:52 AM   Site Assessment Red;Yellow 9/24/2019 10:52 AM   Agata-wound Assessment Edema;Fragile 9/24/2019 10:52 AM   Margins Attached edges 9/24/2019 10:52 AM   Wound Length (cm) 0.5 cm 9/24/2019 10:52 AM   Wound Width (cm) 0.6 cm 9/24/2019 10:52 AM   Wound Depth (cm) 0.1 cm 9/24/2019 10:52 AM   Wound Surface Area (cm^2) 0.3 cm^2 9/24/2019 10:52 AM   Post Wound Length (cm) 0.7 cm 9/24/2019 10:52 AM    Post Wound Width (cm) 0.6 cm 9/24/2019 10:52 AM   Post Wound Depth (cm) 0.1 cm 9/24/2019 10:52 AM   Post Wound Surface Area (cm^2) 0.42 cm^2 9/24/2019 10:52 AM   Drainage Amount Moderate 9/24/2019 10:52 AM   Drainage Description Serosanguineous 9/24/2019 10:52 AM   Non-staged Wound Description Full thickness 9/24/2019 10:52 AM   Treatments Cleansed;Pharmaceutical agent;Other (Comment) 9/24/2019 10:52 AM   Cleansing Normal Saline Irrigation 9/24/2019 10:52 AM   Periwound Protectant Skin Protectant wipes to Periwound 9/24/2019 10:52 AM   Dressing Options Hydrofiber Silver;Nonadhesive Foam;Hypafix Tape 9/24/2019 10:52 AM   WOUND NURSE ONLY - Odor None 9/24/2019 10:52 AM   WOUND NURSE ONLY - Exposed Structures None 9/24/2019 10:52 AM   WOUND NURSE ONLY - Tissue Type and Percentage Pre debridement: 90% red, 10% yellow 9/24/2019 10:52 AM       Wound 09/24/19 Ankle Right medial ankle superior  (Active)   Wound Image    9/24/2019 10:52 AM   Site Assessment Other (Comment) 9/24/2019 10:52 AM   Agata-wound Assessment Edema;Fragile;Pink 9/24/2019 10:52 AM   Post Wound Length (cm) 2 cm 9/24/2019 10:52 AM    Post Wound Width (cm) 1.6 cm 9/24/2019 10:52 AM   Post Wound Depth (cm) 0 cm 9/24/2019 10:52 AM   Post Wound Surface Area (cm^2) 3.2 cm^2 9/24/2019 10:52 AM   Closure Secondary intention 9/24/2019 10:52 AM   Drainage Amount None 9/24/2019 10:52 AM   Non-staged Wound Description Full  thickness 9/24/2019 10:52 AM   Treatments Cleansed;Pharmaceutical agent;Other (Comment) 9/24/2019 10:52 AM   Cleansing Normal Saline Irrigation 9/24/2019 10:52 AM   Periwound Protectant Skin Protectant wipes to Periwound 9/24/2019 10:52 AM   Dressing Options Hydrofiber Silver;Adhesive Foam 9/24/2019 10:52 AM   Dressing Cleansing/Solutions Normal Saline 9/24/2019 10:52 AM   WOUND NURSE ONLY - Odor None 9/24/2019 10:52 AM   WOUND NURSE ONLY - Exposed Structures None 9/24/2019 10:52 AM   WOUND NURSE ONLY - Tissue Type and Percentage Pre debridement: 100% dried blister 9/24/2019 10:52 AM       Wound 09/24/19 Ankle Right medial ankle inferior (Active)   Site Assessment Other (Comment) 9/24/2019 10:52 AM   Agata-wound Assessment Edema;Fragile;Pink 9/24/2019 10:52 AM   Post Wound Length (cm) 0.6 cm 9/24/2019 10:52 AM    Post Wound Width (cm) 1.5 cm 9/24/2019 10:52 AM   Post Wound Depth (cm) 0.1 cm 9/24/2019 10:52 AM   Post Wound Surface Area (cm^2) 0.9 cm^2 9/24/2019 10:52 AM   Closure Secondary intention 9/24/2019 10:52 AM   Drainage Amount None 9/24/2019 10:52 AM   Non-staged Wound Description Full thickness 9/24/2019 10:52 AM   Treatments Cleansed;Pharmaceutical agent;Other (Comment) 9/24/2019 10:52 AM   Cleansing Normal Saline Irrigation 9/24/2019 10:52 AM   Periwound Protectant Skin Protectant wipes to Periwound 9/24/2019 10:52 AM   Dressing Options Hydrofiber Silver;Adhesive Foam 9/24/2019 10:52 AM   Dressing Cleansing/Solutions Normal Saline 9/24/2019 10:52 AM   WOUND NURSE ONLY - Odor None 9/24/2019 10:52 AM   WOUND NURSE ONLY - Exposed Structures None 9/24/2019 10:52 AM   WOUND NURSE ONLY - Tissue Type and Percentage Pre debridement 100% dried blister 9/24/2019 10:52 AM             Pre-debridement Photo    Right dorsal hand wound, pre-debridement        Right distal/plantar toe, no debridement        Right lateral heel, no debridement        Medial ankle blister, no debridement              PROCEDURE: Excisional  debridement of right dorsal hand wound, right medial ankle wounds, and right lateral heel wounds  -2% viscous lidocaine applied topically to wound beds for approximately 5 minutes prior to debridement  -  4m curette used to debride wound beds.  Excisional debridement was performed to remove devitalized tissue until healthy, bleeding tissue was visualized.   Entire surface of wounds, approximately 5 cm², debrided  Tissue debrided into the subcutaneous layer.   -Bleeding controlled with manual pressure   -Wound care completed by  Mert Wahl RN-refer to flowsheet    -Wound care to all other wounds also completed byAllegra -refer to flowsheet    Post-debridement Photo  Right dorsal hand wound, post debridement      Right medial ankle wound, post debridement    Right lateral heel ulcer, post debridement          PATIENT EDUCATION  -Advised to go to ER for any increased redness, swelling, drainage or odor, or if patient develops fever, chills, nausea or vomiting.  -Importance of adequate nutrition for wound healing  -Increase protein intake (unless contraindicated by renal status)    ASSESSMENT AND PLAN:     1. Skin tear of right hand without complication, subsequent encounter  Comments: Trauma from bumping into bedside tray while in the hospital    9/24: Wound improved, area decreasing.  Anticipate full resolution of this wound within 7 to 10 days.  -Excisional debridement of wound in clinic today, medically necessary to promote wound healing.  -Patient to return to clinic weekly for assessment and debridement  -Patient to change dressing 1-2 times per week in between clinic visits   Wound care: Nonadherent contact layer to wound bed to minimize trauma with dressing removal, silver Hydrofiber to manage exudate and bioburden, foam cover dressing    2. Ischemic toe    9/24: Stable dry eschar to distal right hallux.  No drainage,  no fluctuance.  Periwound erythema  -No debridement.  Painted with Betadine  -Patient to  follow-up with Dr. Peters tomorrow regarding arterial status    3. Blister of right foot, subsequent encounter    9/24: Blister noted to heal last clinic visit is now broken, open wound exposed.  -Excisional debridement of wound in clinic today, medically necessary to promote wound healing.  -Patient to return to clinic weekly for assessment and debridement  -Patient to change dressing 1-2 times per week in between clinic visits   Wound care: Silver Hydrofiber to manage exudate and bioburden, foam cover dressing, Hypafix tape    4. Ischemic pain of right foot    9/24: Per patient, pain slightly better today.  -Angiogram with atherectomy and angioplasty on 9/14.  Pain should improve with better perfusion.  -He has a follow-up appoint with Dr. Peters tomorrow    Please note that this dictation was created using voice recognition software. I have worked with technical experts from [facility] to optimize the interface.  I have made every reasonable attempt to correct obvious errors, but there may be errors of grammar and possibly content that I did not discover before finalizing the note.

## 2019-09-26 ENCOUNTER — ANTICOAGULATION MONITORING (OUTPATIENT)
Dept: VASCULAR LAB | Facility: MEDICAL CENTER | Age: 79
End: 2019-09-26

## 2019-09-26 ENCOUNTER — OFFICE VISIT (OUTPATIENT)
Dept: CARDIOLOGY | Facility: MEDICAL CENTER | Age: 79
End: 2019-09-26
Payer: MEDICARE

## 2019-09-26 VITALS
HEIGHT: 68 IN | DIASTOLIC BLOOD PRESSURE: 50 MMHG | WEIGHT: 160.5 LBS | HEART RATE: 54 BPM | OXYGEN SATURATION: 100 % | SYSTOLIC BLOOD PRESSURE: 120 MMHG | BODY MASS INDEX: 24.32 KG/M2

## 2019-09-26 DIAGNOSIS — I48.4 ATYPICAL ATRIAL FLUTTER (HCC): Chronic | ICD-10-CM

## 2019-09-26 DIAGNOSIS — E78.5 DYSLIPIDEMIA: ICD-10-CM

## 2019-09-26 DIAGNOSIS — I10 ESSENTIAL HYPERTENSION: ICD-10-CM

## 2019-09-26 DIAGNOSIS — I73.9 PAD (PERIPHERAL ARTERY DISEASE) (HCC): ICD-10-CM

## 2019-09-26 DIAGNOSIS — Z79.01 CHRONIC ANTICOAGULATION: Chronic | ICD-10-CM

## 2019-09-26 DIAGNOSIS — I48.4 ATYPICAL ATRIAL FLUTTER (HCC): ICD-10-CM

## 2019-09-26 DIAGNOSIS — I35.0 MODERATE AORTIC STENOSIS: ICD-10-CM

## 2019-09-26 PROBLEM — I48.0 PAROXYSMAL ATRIAL FIBRILLATION (HCC): Status: RESOLVED | Noted: 2019-07-29 | Resolved: 2019-09-26

## 2019-09-26 PROBLEM — I31.4 PERICARDIAL TAMPONADE: Status: RESOLVED | Noted: 2019-07-31 | Resolved: 2019-09-26

## 2019-09-26 LAB
MYCOBACTERIUM SPEC CULT: NORMAL
RHODAMINE-AURAMINE STN SPEC: NORMAL
SIGNIFICANT IND 70042: NORMAL
SITE SITE: NORMAL
SOURCE SOURCE: NORMAL

## 2019-09-26 PROCEDURE — 99215 OFFICE O/P EST HI 40 MIN: CPT | Mod: 24 | Performed by: INTERNAL MEDICINE

## 2019-09-26 ASSESSMENT — ENCOUNTER SYMPTOMS
NEUROLOGICAL NEGATIVE: 1
NERVOUS/ANXIOUS: 0
CLAUDICATION: 1
WEAKNESS: 0
FOCAL WEAKNESS: 0
PSYCHIATRIC NEGATIVE: 1
PALPITATIONS: 0
DIZZINESS: 0
DEPRESSION: 0
GASTROINTESTINAL NEGATIVE: 1
CHILLS: 0
WEIGHT LOSS: 1
BLURRED VISION: 0
EYES NEGATIVE: 1
MUSCULOSKELETAL NEGATIVE: 1
NAUSEA: 0
COUGH: 0
FEVER: 0
VOMITING: 0
HEADACHES: 0
ABDOMINAL PAIN: 0
BRUISES/BLEEDS EASILY: 1
SHORTNESS OF BREATH: 0
RESPIRATORY NEGATIVE: 1
DOUBLE VISION: 0
MYALGIAS: 0

## 2019-09-26 NOTE — PROGRESS NOTES
Anticoagulation Summary  As of 2019    INR goal:   2.0-3.0   TTR:   40.7 % (1.8 mo)   INR used for dosin.38 (2019)   Warfarin maintenance plan:   2.5 mg (5 mg x 0.5) every Mon, Fri; 5 mg (5 mg x 1) all other days   Weekly warfarin total:   30 mg   Plan last modified:   Catina M Filter (2019)   Next INR check:      Target end date:   Indefinite    Indications    Atrial flutter (HCC) [I48.92]             Anticoagulation Episode Summary     INR check location:   Anticoagulation Clinic    Preferred lab:       Send INR reminders to:       Comments:   [facility] lab      Anticoagulation Care Providers     Provider Role Specialty Phone number    [facility]   [phone]    Martha Light M.D.  [facility] [phone]        Anticoagulation Patient Findings      Spoke with patient  to report a therapeutic INR.    Pt instructed to continue with current warfarin dosing regimen, confirms dosing.   Pt denies any s/s of bleeding, bruising, clotting or any changes to diet or medication.    Will follow up in 1 week(s).     Hina High, Pharmacy Intern

## 2019-09-26 NOTE — PROGRESS NOTES
"Chief Complaint   Patient presents with   • Atrial Flutter       Subjective:   Ronny Castelan is a 79 y.o. male who presents today for hospital follow up.    Since the discharge from [facility] on 09/15/19 following right leg angiogram; atherectomy and angioplasty, he has continued to experience right lower extremity claudications and unresolving multiple wound. He denies shortness of breath, dyspnea on exertion, chest pain, dizziness or syncope.    Past Medical History:   Diagnosis Date   • Anesthesia     \"needs more sedation\" (can sometimes hear MD during procedure)    • Anticoagulation monitoring, special range    • Aortic stenosis     mod AS- concern for low flow severe AS with rEFof 30%   • Arterial leg ulcer ([facility]) 11/8/2018   • Atrial flutter ([facility]) 6/12/2019   • Basal cell carcinoma of left cheek 3/26/2015   • BPH 7/14/2009   • Bronchitis 12/25/2018   • CAD (coronary artery disease) 2/20/2014   • CATARACT     sanjuanita surgery complete   • CHF (congestive heart failure), NYHA class II, chronic, systolic ([facility]) E F30 in setting of atrial flutter 6/13/2019   • Chronic respiratory failure with hypoxia ([facility]) 5/8/2016   • CKD (chronic kidney disease) stage 4, GFR 15-29 ml/min ([facility]) 1/15/2010    end stage renal disease   • COPD (chronic obstructive pulmonary disease) ([facility])     wears 2.5 L o2 sometimes when he sleeps   • Detached retina    • Dialysis     m,w,[facility]   • EMPHYSEMA    • Gout    • Heart burn     occas   • Heart murmur     maria esther lee cardiologist   • High cholesterol    • Hypertension    • Indigestion     occas   • Kidney cyst    • Leg pain, bilateral 8/10/2015   • On supplemental oxygen therapy    • Peripheral vascular disease ([facility]) 8/10/2015   • Pneumonia    • Primary insomnia 3/22/2018   • Proteinuria    • PVD (peripheral vascular disease) ([facility])    • Sleep apnea     O2 PER CANULA  AT NIGHT 2l/m     Past Surgical History:   Procedure Laterality Date   • AV FISTULA CREATION Right " 8/6/2019    Procedure: CREATION, AV FISTULA -  RIGHT ARM  ,  and Ligation of Left Arm Fistula;  Surgeon: Sera Peters M.D.;  Location: SURGERY [facility];  Service: General   • CATH PLACEMENT Right 8/6/2019    Procedure: INSERTION, CATHETER - PERMA ,;  Surgeon: Sera Peters M.D.;  Location: SURGERY [facility];  Service: General   • JUSTIN  6/13/2019    Procedure: ECHOCARDIOGRAM, TRANSESOPHAGEAL;  Surgeon: Harvinder Hoang M.D.;  Location: SURGERY [facility];  Service: Cardiac   • CARDIOVERSION  6/13/2019    Procedure: CARDIOVERSION;  Surgeon: Harvinder Hoang M.D.;  Location: [facility];  Service: Cardiac   • AV FISTULA CREATION Right 2/21/2019    Procedure: AV FISTULA CREATION - ARM;  Surgeon: David Cason M.D.;  Location: SURGERY [facility];  Service: General   • FEMORAL ENDARTERECTOMY Left 1/25/2019    Procedure: FEMORAL ENDARTERECTOMY;  Surgeon: David Cason M.D.;  Location: SURGERY [facility];  Service: General   • FEMORAL POPLITEAL BYPASS Left 1/25/2019    Procedure: LEFT FEMORAL POPLITEAL POLYTETRAFLUOROETHYLENE ePTFE(PROPATEN VASCULAR GRAFT) BYPASS;  Surgeon: David Cason M.D.;  Location: [facility];  Service: General   • ANGIOGRAM Left 1/25/2019    Procedure: LEFT LEG ANGIOGRAM;  Surgeon: David Cason M.D.;  Location: SURGERY [facility];  Service: General   • ENDOSCOPY PROCEDURE  3/21/2018    Procedure: ENDOSCOPY PROCEDURE/UPPER;  Surgeon: Enrique Child D.O.;  Location: SURGERY [facility];  Service: Gastroenterology   • COLONOSCOPY - ENDO  8/15/2016    Procedure: COLONOSCOPY - ENDO;  Surgeon: Mane Whatley M.D.;  Location: ENDOSCOPY [facility];  Service:    • GASTROSCOPY WITH BIOPSY  8/13/2016    Procedure: GASTROSCOPY WITH BIOPSY;  Surgeon: Jorge Leavitt M.D.;  Location: ENDOSCOPY [facility];  Service:    • RECOVERY  12/23/2015    Procedure: VASCULAR CASE-BLANKA-LEFT ARM FISTULOGRAM WITH  ANGIOPLASTY;  Surgeon: Recoveryonly Surgery;  Location: SURGERY PRE-POST PROC UNIT [facility];  Service:    • RECOVERY  3/24/2015    Performed by Recoveryonly Surgery at SURGERY PRE-POST PROC UNIT [facility]   • RECOVERY  7/29/2014    Performed by Ir-Recovery Surgery at SURGERY SAME DAY [facility] ORS   • RECOVERY  3/24/2014    Performed by [facility]Recovery Surgery at SURGERY [facility]   • RECOVERY  12/17/2013    Performed by Ir-Recovery Surgery at SURGERY SAME DAY [facility] ORS   • RECOVERY  7/2/2013    Performed by [facility]Recovery Surgery at SURGERY SAME DAY [facility] ORS   • AV FISTULA THROMBOLYSIS  7/2/2013    Performed by David Cason M.D. at SURGERY [facility]   • RECOVERY  1/29/2013    Performed by Ir-Recovery Surgery at SURGERY SAME DAY [facility]   • RECOVERY  7/23/2012    Performed by SURGERY, IR-RECOVERY at SURGERY SAME DAY [facility] ORS   • VITRECTOMY POSTERIOR  10/11/2011    Performed by NAHUM GE at SURGERY SAME DAY [facility] ORS   • RECOVERY  8/12/2011    Performed by SURGERY, IR-RECOVERY at SURGERY SAME DAY [facility] ORS   • VITRECTOMY POSTERIOR  1/18/2011    Performed by NAHUM GE at SURGERY SAME DAY [facility] ORS   • SCLERAL BUCKLING  1/18/2011    Performed by NAHUM GE at SURGERY SAME DAY [facility] ORS   • AV FISTULOGRAM  9/17/2010    Performed by DAVID CASON at SURGERY [facility] ORS   • ANGIOPLASTY BALLOON  9/17/2010    Performed by DAVID CASON at SURGERY [facility] ORS   • AV FISTULOGRAM  7/23/2010    Performed by DAVID CASON at SURGERY [facility] ORS   • ANGIOPLASTY BALLOON  7/23/2010    Performed by DAVID CASON at SURGERY [facility] ORS   • AV FISTULA REVISION  2/19/2010    Performed by WILLIE HATCH at SURGERY [facility] ORS   • AV FISTULA CREATION  2/12/2010    Performed by DAVID CASON at SURGERY [facility] ORS   • CATARACT PHACO WITH IOL  4/8/08    Performed by STACEY PHILLIPS at SURGERY SAME DAY [facility] ORS   • OTHER ORTHOPEDIC  SURGERY  1998    right toe for facititis     Family History   Problem Relation Age of Onset   • Stroke Mother    • Hypertension Mother    • Lung Disease Father         Emphysema, resp failure   • Genitourinary () Problems Maternal Aunt         hematuria   • Hypertension Brother      Social History     Socioeconomic History   • Marital status:      Spouse name: Not on file   • Number of children: Not on file   • Years of education: Not on file   • Highest education level: Not on file   Occupational History   • Not on file   Social Needs   • Financial resource strain: Not on file   • Food insecurity:     Worry: Not on file     Inability: Not on file   • Transportation needs:     Medical: Not on file     Non-medical: Not on file   Tobacco Use   • Smoking status: Former Smoker     Packs/day: 1.00     Years: 40.00     Pack years: 40.00     Types: Cigarettes     Last attempt to quit: 1/1/2009     Years since quitting: 10.7   • Smokeless tobacco: Never Used   • Tobacco comment: 1 pk a day for 35 yrs, QUIT JAN 1 2010   Substance and Sexual Activity   • Alcohol use: No     Alcohol/week: 0.0 oz   • Drug use: No   • Sexual activity: Never     Partners: Female     Comment: , two sons, 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   Lifestyle   • Physical activity:     Days per week: Not on file     Minutes per session: Not on file   • Stress: Not on file   Relationships   • Social connections:     Talks on phone: Not on file     Gets together: Not on file     Attends Christian service: Not on file     Active member of club or organization: Not on file     Attends meetings of clubs or organizations: Not on file     Relationship status: Not on file   • Intimate partner violence:     Fear of current or ex partner: Not on file     Emotionally abused: Not on file     Physically abused: Not on file     Forced sexual activity: Not on file   Other Topics Concern   • Not on file   Social History Narrative   • Not on file     No Known  Allergies     (Medications reviewed.)    Outpatient Encounter Medications as of 9/26/2019   Medication Sig Dispense Refill   • metoprolol (LOPRESSOR) 50 MG Tab Take 1 Tab by mouth 2 Times a Day. 180 Tab 3   • albuterol 108 (90 Base) MCG/ACT Aero Soln inhalation aerosol Inhale 2 Puffs by mouth every four hours as needed. 8.5 g 0   • zolpidem (AMBIEN) 10 MG Tab Take 10 mg by mouth at bedtime as needed for Sleep.     • warfarin (COUMADIN) 5 MG Tab Take 2.5-5 mg by mouth every day. 2.5 mg (5 mg x 0.5) every Mon, Fri; 5 mg (5 mg x 1) all other days     • torsemide (DEMADEX) 10 MG tablet Take 10 mg by mouth every day.     • amiodarone (CORDARONE) 200 MG Tab Take 1 Tab by mouth 2 Times a Day. 60 Tab 11   • tamsulosin (FLOMAX) 0.4 MG capsule Take 1 Cap by mouth ONE-HALF HOUR AFTER BREAKFAST. 90 Cap 0   • omeprazole (PRILOSEC) 40 MG delayed-release capsule Take 40 mg by mouth every morning.     • calcium acetate (PHOS-LO) 667 MG Cap Take 1,334 mg by mouth 3 times a day, with meals.     • Fluticasone-Umeclidin-Vilant (TRELEGY ELLIPTA) 100-62.5-25 MCG/INH AEROSOL POWDER, BREATH ACTIVATED Inhale 1 Puff by mouth every day. Rinse mouth after use 1 Each 11   • levalbuterol (XOPENEX) 1.25 MG/3ML Nebu Soln 3 mL by Nebulization route every four hours as needed for Shortness of Breath. 120 Bullet 11   • pravastatin (PRAVACHOL) 20 MG Tab Take 20 mg by mouth every evening.     • [DISCONTINUED] pravastatin (PRAVACHOL) 20 MG Tab TAKE ONE TABLET BY MOUTH DAILY (Patient not taking: Reported on 9/26/2019) 90 Tab 3     No facility-administered encounter medications on file as of 9/26/2019.      Review of Systems   Constitutional: Positive for malaise/fatigue and weight loss. Negative for chills and fever.   HENT: Negative.  Negative for hearing loss.    Eyes: Negative.  Negative for blurred vision and double vision.        Visual changes.   Respiratory: Negative.  Negative for cough and shortness of breath.    Cardiovascular: Positive for  "claudication and leg swelling. Negative for chest pain and palpitations.   Gastrointestinal: Negative.  Negative for abdominal pain, nausea and vomiting.   Genitourinary: Negative.  Negative for dysuria and urgency.   Musculoskeletal: Negative.  Negative for joint pain and myalgias.   Skin: Negative.  Negative for itching and rash.   Neurological: Negative.  Negative for dizziness, focal weakness, weakness and headaches.   Endo/Heme/Allergies: Bruises/bleeds easily.   Psychiatric/Behavioral: Negative.  Negative for depression. The patient is not nervous/anxious.         Objective:   /50 (BP Location: Left arm, Patient Position: Sitting, BP Cuff Size: Adult)   Pulse (!) 54   Ht 1.727 m (5' 8\")   Wt 72.8 kg (160 lb 7.9 oz)   SpO2 100%   BMI 24.40 kg/m²     Physical Exam   Constitutional: He is oriented to person, place, and time. He appears cachectic.   Using walker.   HENT:   Head: Normocephalic and atraumatic.   Eyes: EOM are normal.   Neck: No JVD present.   Cardiovascular: Normal rate and regular rhythm.   Murmur heard.  Pulmonary/Chest: Effort normal and breath sounds normal.   Right chest dialysis port.   Abdominal: Soft. Bowel sounds are normal.   No hepatosplenomegaly.   Musculoskeletal: Normal range of motion. He exhibits edema.   Bandaged wounds.   Lymphadenopathy:     He has no cervical adenopathy.   Neurological: He is alert and oriented to person, place, and time.   Skin: Skin is warm and dry.   Psychiatric: He has a normal mood and affect.   CARDIAC STUDIES AND PROCEDURES:       CTA OF CHEST (07/30/19)  No CT evidence of acute inflammatory process in the abdomen or pelvis  Very large pericardial effusion has increased from 2016  Small right pleural effusion has enlarged  Aortic valvular calcifications indicating aortic stenosis. There is also severe coronary disease  Atrophic kidneys with cysts. Hepatic cysts are also seen.  Moderate distal colonic diverticulosis without evidence of " diverticulitis.     ECHOCARDIOGRAM CONCLUSIONS (08/02/19)  Compared to the images of the study done 8/1/19 - there has been   resolution of pericardial effusion.  Left ventricular ejection fraction is visually estimated to be 55%.  Moderate to severe aortic stenosis.Vmax is 3.50  m/s.  Transvalvular gradients are - Peak: 49 mmHg, Mean: 30 mmHg.  Dimensionless index is 0.2.  Right ventricular systolic pressure is estimated to be 55 mmHg.      ECHOCARDIOGRAM CONCLUSIONS (08/01/19)  Intraoperative study performed during pericardiocentesis. Significant   pericardial effusion seen.     ECHOCARDIOGRAM CONCLUSIONS (07/31/19)  Large pericardial effusion with evidence of hemodynamic compromise.   Significant inflow variation is noted across the mitral valve. No clear   RA/RV diastolic collapse. IVC is plethoric. Concern for possible tamponade.       ECHOCARDIOGRAM CONCLUSIONS (08/07/18)  Normal left ventricular chamber size.   Moderate concentric left ventricular hypertrophy (LVPW 1.3 cm).   Mildly reduced left ventricular systolic function.   Left ventricular ejection fraction is visually estimated to be 55%.   Moderate pericardial effusion without evidence of hemodynamic compromise.  Moderate aortic stenosis.  Compared to the images of the prior study done 5/2017-    there has been no significant change in the degree of AS or pericardial effusion. LA size has increased.      EKG performed on (09/09/19) was reviewed: EKG personally interpreted shows sinus bradycardia   with right bundle branch block.  EKG performed on (08/01/19) EKG shows atrial flutter.  EKG performed on (07/30/19) EKG shows atrial flutter with 2:1 block and right bundle-branch block.    Laboratory results of (09/14/19) were reviewed. Bun of 35 mg/dl, creatinine levels of 5.36 mg/dl noted.    PERICARDIOCENTESIS by Placido Rodriguez (08/01/19)  Pericardiocentesis with drain placement.     TRANSESOPHAGEAL ECHOCARDIOGRAM CONCLUSIONS by Dr. Hoang,  Harvinder (06/13/19)  No thrombus detected in the left atrial appendage or other atrial   structures.  Moderately reduced left ventricular systolic function.  Left ventricular ejection fraction is visually estimated to be 30%.  Global hypokinesis.  Diastolic function is difficult to assess with atrial fibrillation.  Reduced right ventricular systolic function.  Mild mitral regurgitation.  Moderate aortic stenosis.  Mild aortic insufficiency.  Moderate pericardial effusion WITHOUT evidence of hemodynamic compromise.  Compared to the images of the transthoracic echocardiogram dated   8/7/2018, the ejection fraction is further reduced previously low   normal.  It is difficult to compare the size of the pericardial   effusion due to technique but it was previously large.    VASCULAR SURGERY by Sera Ruiz (09/14/19)  Right leg angiogram; atherectomy and angioplasty     Assessment:     1. Moderate aortic stenosis     2. Atypical atrial flutter (HCC)     3. Chronic anticoagulation     4. Essential hypertension     5. Dyslipidemia     6. PAD (peripheral artery disease) (HCC)       Medical Decision Making:  Today's Assessment / Status / Plan:     1. Aortic stenosis: He remains asymptomatic with moderate aortic stenosis. He is not a candidate for TAVR due to excessive risk. Though he is not a candidate for valve replacement, he will repeat an echocardiogram as requested by the patinent.  2. Paroxysmal atrial fibrillation on anticoagulation therapy (warfarin): He is doing well on anticoagulation. The ventricular rate is low. We will reduce his metoprolol to 25 mg BID.   3. Status post pericardiocentesis.  4. Hypertension: Blood pressure is well controlled. We will continue with beta blockade therapy.  5. Hyperlipidemia: He is doing well on statin therapy without myalgia symptoms.  6. Peripheral vascular disease: Stable on medical therapy. (Managed by Dr. Peters)  7. Right bundle branch block  8. Chronic  obstructive pulmonary disease  9. End-stage renal disease, on dialysis. (Managed by Long Garcia)    We will follow up in six months with echocardiogram.    CC Martha Ramos

## 2019-10-01 ENCOUNTER — NON-PROVIDER VISIT (OUTPATIENT)
Dept: WOUND CARE | Facility: MEDICAL CENTER | Age: 79
End: 2019-10-01
Attending: PHYSICIAN ASSISTANT
Payer: MEDICARE

## 2019-10-01 PROCEDURE — 99211 OFF/OP EST MAY X REQ PHY/QHP: CPT

## 2019-10-01 PROCEDURE — 99213 OFFICE O/P EST LOW 20 MIN: CPT

## 2019-10-01 NOTE — PATIENT INSTRUCTIONS
-Keep your wound dressing clean, dry, and intact.    -Change your dressing if it becomes soiled, soaked, or falls off.    -Apply betadine daily to your big toe and allow it to dry.    -Should you experience any significant changes in your wound(s), such as infection (redness, swelling, localized heat, increased pain, fever > 101 F, chills) or have any questions regarding your home care instructions, please contact the wound center at [phone]. If after hours, contact your primary care physician or go to the hospital emergency room.

## 2019-10-02 ENCOUNTER — ANTICOAGULATION MONITORING (OUTPATIENT)
Dept: VASCULAR LAB | Facility: MEDICAL CENTER | Age: 79
End: 2019-10-02

## 2019-10-02 ENCOUNTER — HOSPITAL ENCOUNTER (OUTPATIENT)
Dept: LAB | Facility: MEDICAL CENTER | Age: 79
End: 2019-10-02
Attending: NURSE PRACTITIONER
Payer: MEDICARE

## 2019-10-02 DIAGNOSIS — I48.92 ATRIAL FLUTTER, UNSPECIFIED TYPE (HCC): ICD-10-CM

## 2019-10-02 DIAGNOSIS — I48.4 ATYPICAL ATRIAL FLUTTER (HCC): ICD-10-CM

## 2019-10-02 LAB
INR PPP: 2.33 (ref 0.87–1.13)
PROTHROMBIN TIME: 26.4 SEC (ref 12–14.6)

## 2019-10-02 PROCEDURE — 85610 PROTHROMBIN TIME: CPT

## 2019-10-02 PROCEDURE — 36415 COLL VENOUS BLD VENIPUNCTURE: CPT

## 2019-10-02 NOTE — PROGRESS NOTES
Anticoagulation Summary  As of 10/2/2019    INR goal:   2.0-3.0   TTR:   47.3 % (2.1 mo)   INR used for dosin.33 (10/2/2019)   Warfarin maintenance plan:   2.5 mg (5 mg x 0.5) every Mon, Fri; 5 mg (5 mg x 1) all other days   Weekly warfarin total:   30 mg   Plan last modified:   Catina Seymour (2019)   Next INR check:   10/16/2019   Target end date:   Indefinite    Indications    Atrial flutter (HCC) [I48.92]             Anticoagulation Episode Summary     INR check location:   Anticoagulation Clinic    Preferred lab:       Send INR reminders to:       Comments:   [facility]      Anticoagulation Care Providers     Provider Role Specialty Phone number    [facility]   [phone]    Martha Light M.D.  [facility] [phone]        Anticoagulation Patient Findings      Left voicemail message to report a therapeutic INR of 2.3.    Pt to continue with current warfarin dosing regimen. Requested pt contact the clinic for any s/s of unusual bleeding, bruising, clotting or any changes to diet or medication.    FU INR in 2 week(s).    Ashley Cabrera, PharmD

## 2019-10-07 DIAGNOSIS — I10 ESSENTIAL HYPERTENSION: ICD-10-CM

## 2019-10-08 ENCOUNTER — NON-PROVIDER VISIT (OUTPATIENT)
Dept: WOUND CARE | Facility: MEDICAL CENTER | Age: 79
End: 2019-10-08
Attending: PHYSICIAN ASSISTANT
Payer: MEDICARE

## 2019-10-08 PROCEDURE — 99211 OFF/OP EST MAY X REQ PHY/QHP: CPT

## 2019-10-08 RX ORDER — TORSEMIDE 10 MG/1
TABLET ORAL
Qty: 30 TAB | Refills: 2 | Status: SHIPPED | OUTPATIENT
Start: 2019-10-08 | End: 2019-11-17 | Stop reason: SDUPTHER

## 2019-10-09 ENCOUNTER — ANTICOAGULATION MONITORING (OUTPATIENT)
Dept: VASCULAR LAB | Facility: MEDICAL CENTER | Age: 79
End: 2019-10-09

## 2019-10-09 ENCOUNTER — HOSPITAL ENCOUNTER (OUTPATIENT)
Dept: LAB | Facility: MEDICAL CENTER | Age: 79
End: 2019-10-09
Attending: NURSE PRACTITIONER
Payer: MEDICARE

## 2019-10-09 DIAGNOSIS — I48.4 ATYPICAL ATRIAL FLUTTER (HCC): ICD-10-CM

## 2019-10-09 DIAGNOSIS — I48.92 ATRIAL FLUTTER, UNSPECIFIED TYPE (HCC): ICD-10-CM

## 2019-10-09 LAB
INR PPP: 2.09 (ref 0.87–1.13)
PROTHROMBIN TIME: 24.1 SEC (ref 12–14.6)

## 2019-10-09 PROCEDURE — 85610 PROTHROMBIN TIME: CPT

## 2019-10-09 PROCEDURE — 36415 COLL VENOUS BLD VENIPUNCTURE: CPT

## 2019-10-09 NOTE — PROGRESS NOTES
Anticoagulation Summary  As of 10/9/2019    INR goal:   2.0-3.0   TTR:   52.6 % (2.3 mo)   INR used for dosin.09 (10/9/2019)   Warfarin maintenance plan:   2.5 mg (5 mg x 0.5) every Mon, Fri; 5 mg (5 mg x 1) all other days   Weekly warfarin total:   30 mg   Plan last modified:   Catina OSEI Filter (2019)   Next INR check:   10/23/2019   Target end date:   Indefinite    Indications    Atrial flutter (HCC) [I48.92]             Anticoagulation Episode Summary     INR check location:   Anticoagulation Clinic    Preferred lab:       Send INR reminders to:       Comments:   [facility] lab      Anticoagulation Care Providers     Provider Role Specialty Phone number    [facility]   [phone]    Martha Light M.D.  [facility] [phone]        Anticoagulation Patient Findings    Left voicemail message to report a therapeutic INR of 2.09.  Pt to continue with current warfarin dosing regimen. Requested pt contact the clinic for any s/s of unusual bleeding, bruising, clotting or any changes to diet or medication. FU 2 weeks.  Federico Cerda, PharmD, BCACP

## 2019-10-09 NOTE — PATIENT INSTRUCTIONS
Should you experience any significant changes in your wound(s) such as infection (redness, swelling, localized heat, increased pain, fever >101 F, chills) or have any questions regarding your home care instructions, please contact the wound center [phone]. If after hours, contact your primary care physician or go the hospital emergency room.  Keep areas clean and dry and cover while bathing. Paint eschar daily with betadine.

## 2019-10-10 ENCOUNTER — TELEPHONE (OUTPATIENT)
Dept: CARDIOLOGY | Facility: MEDICAL CENTER | Age: 79
End: 2019-10-10

## 2019-10-10 ENCOUNTER — OFFICE VISIT (OUTPATIENT)
Dept: CARDIOLOGY | Facility: MEDICAL CENTER | Age: 79
End: 2019-10-10
Payer: MEDICARE

## 2019-10-10 VITALS
BODY MASS INDEX: 23.64 KG/M2 | HEIGHT: 68 IN | DIASTOLIC BLOOD PRESSURE: 50 MMHG | SYSTOLIC BLOOD PRESSURE: 144 MMHG | WEIGHT: 156 LBS | HEART RATE: 59 BPM | OXYGEN SATURATION: 90 %

## 2019-10-10 DIAGNOSIS — I10 ESSENTIAL HYPERTENSION: ICD-10-CM

## 2019-10-10 DIAGNOSIS — I48.3 TYPICAL ATRIAL FLUTTER (HCC): Primary | ICD-10-CM

## 2019-10-10 DIAGNOSIS — Z79.01 CHRONIC ANTICOAGULATION: ICD-10-CM

## 2019-10-10 DIAGNOSIS — Z79.899 HIGH RISK MEDICATION USE: ICD-10-CM

## 2019-10-10 DIAGNOSIS — Z99.2 ESRD (END STAGE RENAL DISEASE) ON DIALYSIS (HCC): ICD-10-CM

## 2019-10-10 DIAGNOSIS — G25.81 RESTLESS LEG: ICD-10-CM

## 2019-10-10 DIAGNOSIS — N18.6 ESRD (END STAGE RENAL DISEASE) ON DIALYSIS (HCC): ICD-10-CM

## 2019-10-10 PROBLEM — Z09 HOSPITAL DISCHARGE FOLLOW-UP: Status: RESOLVED | Noted: 2019-09-17 | Resolved: 2019-10-10

## 2019-10-10 PROBLEM — R57.9 SHOCK (HCC): Status: RESOLVED | Noted: 2019-08-01 | Resolved: 2019-10-10

## 2019-10-10 LAB — EKG IMPRESSION: NORMAL

## 2019-10-10 PROCEDURE — 99215 OFFICE O/P EST HI 40 MIN: CPT | Performed by: NURSE PRACTITIONER

## 2019-10-10 PROCEDURE — 93000 ELECTROCARDIOGRAM COMPLETE: CPT | Performed by: INTERNAL MEDICINE

## 2019-10-10 RX ORDER — METOPROLOL TARTRATE 50 MG/1
25 TABLET, FILM COATED ORAL 2 TIMES DAILY
Qty: 180 TAB | Refills: 3 | COMMUNITY
Start: 2019-10-10 | End: 2019-11-14

## 2019-10-10 RX ORDER — AMIODARONE HYDROCHLORIDE 200 MG/1
200 TABLET ORAL DAILY
Qty: 30 TAB | Refills: 3 | Status: ON HOLD | OUTPATIENT
Start: 2019-10-10 | End: 2019-10-23

## 2019-10-10 ASSESSMENT — ENCOUNTER SYMPTOMS
PALPITATIONS: 0
WHEEZING: 0
DIZZINESS: 0
PND: 0
DIAPHORESIS: 0
CLAUDICATION: 0
LOSS OF CONSCIOUSNESS: 0
BLOOD IN STOOL: 0
ABDOMINAL PAIN: 0
FEVER: 0
ORTHOPNEA: 0
COUGH: 0
SHORTNESS OF BREATH: 0
CHILLS: 0

## 2019-10-10 NOTE — PROGRESS NOTES
"Cardiology/Electrophysiology Follow-up Note    Subjective:   Chief Complaint:   Chief Complaint   Patient presents with   • Atrial Flutter     PP     Parmjit Castelan is a 79 y.o. male who presents today for follow up for typical atrial flutter, on amiodarone.     He is followed by Dr. Crum and Dr. Grissom.  Last seen by Dr. Crum on 09/26/19 for hospital follow up following right leg angiogram; atherectomy and angioplasty with continued right lower extremity claudications and unresolving wounds.     Medical history significant for typical atrial flutter, on chronic anticoagulation with Coumadin, ESRD with right AV fistula and dialysis MWF, CAD (no interventions), CHF, Hypertension, hyperlipidemia, PVD with chronic claudication, BPH, HELGA- not on CPAP, COPD on oxygen as needed, gout, and moderate AS.    He presents today in follow up with his wife, he states he is feeling well.  He denies chest pain, dizziness, palpitations, dyspnea on exertion, pre syncope or syncope, or lower extremity edema. Denies any signs or symptoms of bleeding or bleeding issues. Patient endorses medication compliance. He is ESRD, receiving HD M,W,F. On coumadin, monitored by anticoagulation clinic.  Followed by Dr. Peters for his peripheral vascular disease.     Past Medical History:   Diagnosis Date   • Anesthesia     \"needs more sedation\" (can sometimes hear MD during procedure)    • Anticoagulation monitoring, special range    • Aortic stenosis     mod AS- concern for low flow severe AS with rEFof 30%   • Arterial leg ulcer ([facility]) 11/8/2018   • Atrial flutter ([facility]) 6/12/2019   • Basal cell carcinoma of left cheek 3/26/2015   • BPH 7/14/2009   • Bronchitis 12/25/2018   • CAD (coronary artery disease) 2/20/2014   • CATARACT     sanjuanita surgery complete   • CHF (congestive heart failure), NYHA class II, chronic, systolic ([facility]) E F30 in setting of atrial flutter 6/13/2019   • Chronic respiratory failure with hypoxia ([facility]) 5/8/2016   • CKD (chronic " kidney disease) stage 4, GFR 15-29 ml/min ([facility]) 1/15/2010    end stage renal disease   • COPD (chronic obstructive pulmonary disease) ([facility])     wears 2.5 L o2 sometimes when he sleeps   • Detached retina    • [facility]   • EMPHYSEMA    • Gout    • Heart burn     occas   • Heart murmur     maria esther lee cardiologist   • High cholesterol    • Hypertension    • Indigestion     occas   • Kidney cyst    • Leg pain, bilateral 8/10/2015   • On supplemental oxygen therapy    • Peripheral vascular disease ([facility]) 8/10/2015   • Pneumonia    • Primary insomnia 3/22/2018   • Proteinuria    • PVD (peripheral vascular disease) ([facility])    • Sleep apnea     O2 PER CANULA  AT NIGHT 2l/m     Past Surgical History:   Procedure Laterality Date   • AV FISTULA CREATION Right 8/6/2019    Procedure: CREATION, AV FISTULA -  RIGHT ARM  ,  and Ligation of Left Arm Fistula;  Surgeon: Sera Peters M.D.;  Location: [facility];  Service: General   • CATH PLACEMENT Right 8/6/2019    Procedure: INSERTION, CATHETER - PERMA ,;  Surgeon: Sera Peters M.D.;  Location: [facility];  Service: General   • JUSTIN  6/13/2019    Procedure: ECHOCARDIOGRAM, TRANSESOPHAGEAL;  Surgeon: Harvinder Hoang M.D.;  Location: [facility];  Service: Cardiac   • CARDIOVERSION  6/13/2019    Procedure: CARDIOVERSION;  Surgeon: Harvinder Hoang M.D.;  Location: [facility];  Service: Cardiac   • AV FISTULA CREATION Right 2/21/2019    Procedure: AV FISTULA CREATION - ARM;  Surgeon: David Cason M.D.;  Location: [facility];  Service: General   • FEMORAL ENDARTERECTOMY Left 1/25/2019    Procedure: FEMORAL ENDARTERECTOMY;  Surgeon: David Cason M.D.;  Location: [facility];  Service: General   • FEMORAL POPLITEAL BYPASS Left 1/25/2019    Procedure: LEFT FEMORAL POPLITEAL POLYTETRAFLUOROETHYLENE ePTFE(PROPATEN VASCULAR GRAFT) BYPASS;  Surgeon:  David Cason M.D.;  Location: SURGERY [facility];  Service: General   • ANGIOGRAM Left 1/25/2019    Procedure: LEFT LEG ANGIOGRAM;  Surgeon: David Cason M.D.;  Location: SURGERY [facility];  Service: General   • ENDOSCOPY PROCEDURE  3/21/2018    Procedure: ENDOSCOPY PROCEDURE/UPPER;  Surgeon: Enrique Child D.O.;  Location: SURGERY [facility];  Service: Gastroenterology   • COLONOSCOPY - ENDO  8/15/2016    Procedure: COLONOSCOPY - ENDO;  Surgeon: Mane Whatley M.D.;  Location: ENDOSCOPY [facility];  Service:    • GASTROSCOPY WITH BIOPSY  8/13/2016    Procedure: GASTROSCOPY WITH BIOPSY;  Surgeon: Jorge Leavitt M.D.;  Location: ENDOSCOPY [facility];  Service:    • RECOVERY  12/23/2015    Procedure: VASCULAR CASE-CASON-LEFT ARM FISTULOGRAM WITH ANGIOPLASTY;  Surgeon: [facility];  [place] [facility]  Service:    • RECOVERY  3/24/2015    Performed by [facility] at [place] [facility] [facility]   • RECOVERY  7/29/2014    Performed by [facility] at SURGERY SAME DAY [facility]   • RECOVERY  3/24/2014    Performed by [facility] at SURGERY [facility]   • RECOVERY  12/17/2013    Performed by [facility] at SURGERY SAME DAY [facility]   • RECOVERY  7/2/2013    Performed by [facility] at SURGERY SAME DAY [facility]   • AV FISTULA THROMBOLYSIS  7/2/2013    Performed by David Cason M.D. at SURGERY [facility]   • RECOVERY  1/29/2013    Performed by [facility] at SURGERY SAME DAY [facility]   • RECOVERY  7/23/2012    Performed by SURGERY, IR-RECOVERY at SURGERY SAME DAY [facility]   • VITRECTOMY POSTERIOR  10/11/2011    Performed by NAHUM GE at SURGERY SAME DAY [facility]   • RECOVERY  8/12/2011    Performed by SURGERY, IR-RECOVERY at SURGERY SAME DAY [facility]   • VITRECTOMY POSTERIOR  1/18/2011    Performed by NAHUM GE at SURGERY SAME DAY [facility]    • SCLERAL BUCKLING  1/18/2011    Performed by NAHUM GE at SURGERY SAME DAY [facility]   • AV FISTULOGRAM  9/17/2010    Performed by ALESHIA MOSCOSO at SURGERY [facility]   • ANGIOPLASTY BALLOON  9/17/2010    Performed by ALESHIA MOSCOSO at SURGERY [facility]   • AV FISTULOGRAM  7/23/2010    Performed by ALESHIA MOSCOSO at SURGERY [facility]   • ANGIOPLASTY BALLOON  7/23/2010    Performed by ALESHIA MOSCOSO at SURGERY [facility]   • AV FISTULA REVISION  2/19/2010    Performed by WILLIE HATCH at SURGERY [facility]   • AV FISTULA CREATION  2/12/2010    Performed by ALESHIA MOSCOSO at SURGERY [facility]   • CATARACT PHACO WITH IOL  4/8/08    Performed by STACEY PHILLIPS at SURGERY SAME DAY [facility]   • OTHER ORTHOPEDIC SURGERY  1998    right toe for facititis     Family History   Problem Relation Age of Onset   • Stroke Mother    • Hypertension Mother    • Lung Disease Father         Emphysema, resp failure   • Genitourinary () Problems Maternal Aunt         hematuria   • Hypertension Brother      Social History     Socioeconomic History   • Marital status:      Spouse name: Not on file   • Number of children: Not on file   • Years of education: Not on file   • Highest education level: Not on file   Occupational History   • Not on file   Social Needs   • Financial resource strain: Not on file   • Food insecurity:     Worry: Not on file     Inability: Not on file   • Transportation needs:     Medical: Not on file     Non-medical: Not on file   Tobacco Use   • Smoking status: Former Smoker     Packs/day: 1.00     Years: 40.00     Pack years: 40.00     Types: Cigarettes     Last attempt to quit: 1/1/2009     Years since quitting: 10.7   • Smokeless tobacco: Never Used   • Tobacco comment: 1 pk a day for 35 yrs, QUIT JAN 1 2010   Substance and Sexual Activity   • Alcohol use: No     Alcohol/week: 0.0 oz   • Drug use: No   • Sexual activity: Never      Partners: Female     Comment: , two sons, 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   Lifestyle   • Physical activity:     Days per week: Not on file     Minutes per session: Not on file   • Stress: Not on file   Relationships   • Social connections:     Talks on phone: Not on file     Gets together: Not on file     Attends [employer] service: Not on file     Active member of club or organization: Not on file     Attends meetings of clubs or organizations: Not on file     Relationship status: Not on file   • Intimate partner violence:     Fear of current or ex partner: Not on file     Emotionally abused: Not on file     Physically abused: Not on file     Forced sexual activity: Not on file   Other Topics Concern   • Not on file   Social History Narrative   • Not on file     No Known Allergies    Current Outpatient Medications   Medication Sig Dispense Refill   • amiodarone (CORDARONE) 200 MG Tab Take 1 Tab by mouth every day. 30 Tab 3   • torsemide (DEMADEX) 10 MG tablet TAKE THREE TABLETS BY MOUTH DAILY 30 Tab 2   • albuterol 108 (90 Base) MCG/ACT Aero Soln inhalation aerosol Inhale 2 Puffs by mouth every four hours as needed. 8.5 g 0   • zolpidem (AMBIEN) 10 MG Tab Take 10 mg by mouth at bedtime as needed for Sleep.     • warfarin (COUMADIN) 5 MG Tab Take 2.5-5 mg by mouth every day. 2.5 mg (5 mg x 0.5) every Mon, Fri; 5 mg (5 mg x 1) all other days     • tamsulosin (FLOMAX) 0.4 MG capsule Take 1 Cap by mouth ONE-HALF HOUR AFTER BREAKFAST. 90 Cap 0   • omeprazole (PRILOSEC) 40 MG delayed-release capsule Take 40 mg by mouth every morning.     • calcium acetate (PHOS-LO) 667 MG Cap Take 1,334 mg by mouth 3 times a day, with meals.     • Fluticasone-Umeclidin-Vilant (TRELEGY ELLIPTA) 100-62.5-25 MCG/INH AEROSOL POWDER, BREATH ACTIVATED Inhale 1 Puff by mouth every day. Rinse mouth after use 1 Each 11   • levalbuterol (XOPENEX) 1.25 MG/3ML Nebu Soln 3 mL by Nebulization route every four hours as needed for Shortness of  "Breath. 120 Bullet 11   • pravastatin (PRAVACHOL) 20 MG Tab Take 20 mg by mouth every evening.     • metoprolol (LOPRESSOR) 50 MG Tab Take 0.5 Tabs by mouth 2 Times a Day. 180 Tab 3     No current facility-administered medications for this visit.        Review of Systems   Constitutional: Negative for chills, diaphoresis and fever.   Respiratory: Negative for cough, shortness of breath and wheezing.    Cardiovascular: Negative for chest pain, palpitations, orthopnea, claudication, leg swelling and PND.   Gastrointestinal: Negative for abdominal pain and blood in stool.   Genitourinary: Negative for hematuria.   Neurological: Negative for dizziness and loss of consciousness.     All others systems reviewed and negative.   Objective:     /50 (BP Location: Left arm, Patient Position: Sitting, BP Cuff Size: Adult)   Pulse (!) 59   Ht 1.727 m (5' 8\")   Wt 70.8 kg (156 lb)   SpO2 90%  Body mass index is 23.72 kg/m².    Physical Exam   Constitutional: He is oriented to person, place, and time. No distress.   Eyes: Pupils are equal, round, and reactive to light.   Neck: No JVD present.   Cardiovascular: Normal rate and regular rhythm.   Murmur heard.  Pulses:       Radial pulses are 2+ on the right side, and 2+ on the left side.        Dorsalis pedis pulses are 2+ on the right side, and 2+ on the left side.   Pulmonary/Chest: Effort normal. No respiratory distress. He has decreased breath sounds in the right lower field and the left lower field. He has no wheezes. He has no rales. He exhibits no tenderness.   Musculoskeletal: He exhibits no edema.   Neurological: He is alert and oriented to person, place, and time.   Skin: Skin is warm and dry. He is not diaphoretic. No erythema.   Right AV fistula-bruit/thrill   Psychiatric: Mood, memory, affect and judgment normal.   Nursing note and vitals reviewed.    Cardiac Imaging and Procedures Review:    EKG 10/10/2019: sinus bradycardia, 1st AV block, " RBBB    Echocardiogram (08/28/2019):   Normal left ventricular systolic function. Left ventricular ejection fraction is visually estimated to be 60%.   No evidence of left atrial appendage thrombus, sludge or spontaneous echo contrast.  Left atrial appendage emptying velocities approximately 20 cm/s.    Labs (personally reviewed and notable for):   Lab Results   Component Value Date/Time    SODIUM 137 09/14/2019 05:58 AM    POTASSIUM 3.9 09/14/2019 05:58 AM    CHLORIDE 97 09/14/2019 05:58 AM    CO2 30 09/14/2019 05:58 AM    GLUCOSE 93 09/14/2019 05:58 AM    BUN 35 (H) 09/14/2019 05:58 AM    CREATININE 5.36 (HH) 09/14/2019 05:58 AM    CREATININE 2.1 (H) 05/06/2009 10:08 AM      Lab Results   Component Value Date/Time    WBC 3.4 (L) 09/14/2019 05:58 AM    RBC 2.90 (L) 09/14/2019 05:58 AM    HEMOGLOBIN 8.7 (L) 09/14/2019 05:58 AM    HEMATOCRIT 30.2 (L) 09/14/2019 05:58 AM    .1 (H) 09/14/2019 05:58 AM    MCH 30.0 09/14/2019 05:58 AM    MCHC 28.8 (L) 09/14/2019 05:58 AM    MPV 9.8 09/14/2019 05:58 AM    NEUTSPOLYS 65.60 09/14/2019 05:58 AM    LYMPHOCYTES 16.90 (L) 09/14/2019 05:58 AM    MONOCYTES 13.00 09/14/2019 05:58 AM    EOSINOPHILS 3.60 09/14/2019 05:58 AM    EOSINOPHILS 1 08/01/2019 03:40 PM    BASOPHILS 0.60 09/14/2019 05:58 AM    HYPOCHROMIA 1+ 09/14/2019 05:58 AM    ANISOCYTOSIS 2+ 09/14/2019 05:58 AM      PT/INR:   Lab Results   Component Value Date/Time    PROTHROMBTM 24.1 (H) 10/09/2019 12:48 PM    INR 2.09 (H) 10/09/2019 12:48 PM   ]  Assessment:     1. Typical atrial flutter (HCC)  EKG   2. High risk medication use      amiodarone   3. Chronic anticoagulation      Coumadin   4. Essential hypertension     5. ESRD (end stage renal disease) on dialysis (HCC)       Medical Decision Making:  Today's Assessment / Status / Plan:   1. Typical Atrial Flutter  - Per echo (08/2019) shows preserved LV function, EF 60%.  - Asymptomatic, currently in sinus rhythm, EKG shows sinus bradycardia, 1st degree AVB,  RBBB, no acute findings.   - BP mildly elevated, continue currently medication management and monitor.   - Patient taking amiodarone 200 mg BID, reduced to 200 mg daily per Dr. Grissom's previous note.    - On metoprolol 25 mg BID, continue.   - On OAC with Coumadin, continue.  - Scheduled for RF ablation 10/23/19.  All patient questions/concerns were answered. Patient wishes to proceed with procedure.     2. ESRD  - HD M,W,F  - Followed up [facility]    3. High risk medication: amiodarone  - recommendations per above.     4. Chronic Anticoagulation: Coumadin  - No signs/symptoms of bleeding.   - Continue OAC with Coumadin, monitored by anticoagulation clinic, continue.    Plan reviewed in detail with the patient and he verbalizes understanding and is in agreement. RTC 3-4 weeks post ablation, sooner if clinical condition changes.     Thank you for allowing me to participate in this patients care.  Please contact me with any questions or concerns.     DESIREE Jiang.   [facility]  [phone]    Collaborating MD/ADD: Dr. Anna MD.

## 2019-10-10 NOTE — TELEPHONE ENCOUNTER
DESIREE Jiang.  Karen Morris R.N.             Dear Karen,     Could you please follow up with patient later today and find out how much metoprolol patient is taking (unsure of medication dosages today).  Also, let them know to reduce amiodarone to 200 mg daily (per Dr. Grissom's previous office note).      Called and s/w pt's wife. They verified that pt has metoprolol 50mg tabs, and has been taking half tab BID, 25mg BID. MAR updated. Also discussed that pt should be taking amiodarone 200mg only once daily. She verbalizes understanding.    FYI to ED

## 2019-10-15 ENCOUNTER — NON-PROVIDER VISIT (OUTPATIENT)
Dept: WOUND CARE | Facility: MEDICAL CENTER | Age: 79
End: 2019-10-15
Attending: PHYSICIAN ASSISTANT
Payer: MEDICARE

## 2019-10-15 PROCEDURE — 97597 DBRDMT OPN WND 1ST 20 CM/<: CPT

## 2019-10-15 RX ORDER — ROPINIROLE 0.5 MG/1
TABLET, FILM COATED ORAL
Qty: 60 TAB | Refills: 3 | Status: SHIPPED | OUTPATIENT
Start: 2019-10-15 | End: 2019-11-14

## 2019-10-16 ENCOUNTER — HOSPITAL ENCOUNTER (OUTPATIENT)
Dept: LAB | Facility: MEDICAL CENTER | Age: 79
End: 2019-10-16
Attending: NURSE PRACTITIONER
Payer: MEDICARE

## 2019-10-16 DIAGNOSIS — I48.92 ATRIAL FLUTTER, UNSPECIFIED TYPE (HCC): ICD-10-CM

## 2019-10-16 LAB
INR PPP: 3.09 (ref 0.87–1.13)
PROTHROMBIN TIME: 33 SEC (ref 12–14.6)

## 2019-10-16 PROCEDURE — 85610 PROTHROMBIN TIME: CPT

## 2019-10-16 PROCEDURE — 36415 COLL VENOUS BLD VENIPUNCTURE: CPT

## 2019-10-16 NOTE — PATIENT INSTRUCTIONS
Should you experience any significant changes in your wound(s) such as infection (redness, swelling, localized heat, increased pain, fever >101 F, chills) or have any questions regarding your home care instructions, please contact the wound center [phone]. If after hours, contact your primary care physician or go the hospital emergency room.  Keep dressing clean and dry and cover while bathing. Only change dressing if over saturated, soiled or its falling off.

## 2019-10-17 ENCOUNTER — ANTICOAGULATION MONITORING (OUTPATIENT)
Dept: VASCULAR LAB | Facility: MEDICAL CENTER | Age: 79
End: 2019-10-17

## 2019-10-17 DIAGNOSIS — I48.3 TYPICAL ATRIAL FLUTTER (HCC): ICD-10-CM

## 2019-10-17 NOTE — PROGRESS NOTES
Anticoagulation Summary  As of 10/17/2019    INR goal:   2.0-3.0   TTR:   56.1 % (2.5 mo)   INR used for dosing:   3.09! (10/16/2019)   Warfarin maintenance plan:   2.5 mg (5 mg x 0.5) every Mon, Fri; 5 mg (5 mg x 1) all other days   Weekly warfarin total:   30 mg   Plan last modified:   Catina Seymour (9/17/2019)   Next INR check:   10/23/2019   Target end date:   Indefinite    Indications    Atrial flutter (HCC) [I48.92]             Anticoagulation Episode Summary     INR check location:   Anticoagulation Clinic    Preferred lab:       Send INR reminders to:       Comments:   [facility]  lab      Anticoagulation Care Providers     Provider Role Specialty Phone number    [facility]   [phone]    Martha Light M.D.  [facility] [phone]        Anticoagulation Patient Findings          Spoke with Ronny to report a supra therapeutic INR of 3.1.  Will reduce tonight's dose to 2.5mg, then continue current dosing regimen.   Follow up in 1 weeks, to reduce the risk of adverse events related to this high risk medication, warfarin.    Catina Seymour, Clinical Pharmacist

## 2019-10-21 ENCOUNTER — TELEPHONE (OUTPATIENT)
Dept: VASCULAR LAB | Facility: MEDICAL CENTER | Age: 79
End: 2019-10-21

## 2019-10-21 ENCOUNTER — TELEPHONE (OUTPATIENT)
Dept: CARDIOLOGY | Facility: MEDICAL CENTER | Age: 79
End: 2019-10-21

## 2019-10-21 NOTE — TELEPHONE ENCOUNTER
ED    Pt wants to know if he needs to hold warfarin prior to upcoming procedure on Wednesday. Please call pt to advise at [phone].

## 2019-10-21 NOTE — TELEPHONE ENCOUNTER
S/w patient regarding upcoming cardiac ablation and JUSTIN.  Explained that warfarin did not need to be interrupted for this type of procedure.  Also let him know that weekly INR's for the four weeks following ablation would be appropriate.  He voices understanding with this plan.  Federico Cerda, PharmD, BCACP

## 2019-10-21 NOTE — TELEPHONE ENCOUNTER
EA     Patient is having a procedure with Dr Grissom 10/23/19. They wanted to know if they needed to stop taking the Coumadin or would it be okay to continue to continue to take. Please call the patient back at [phone].    Thank you,   Adrianna LEIGH

## 2019-10-21 NOTE — TELEPHONE ENCOUNTER
Lm to call coumadin clinic for advice and type of procedure. This note sent to them. ED out of office on mondays.

## 2019-10-22 ENCOUNTER — HOSPITAL ENCOUNTER (OUTPATIENT)
Dept: HOSPITAL 8 - CVU | Age: 79
Discharge: HOME | End: 2019-10-22
Attending: SURGERY
Payer: MEDICARE

## 2019-10-22 DIAGNOSIS — Z87.891: ICD-10-CM

## 2019-10-22 DIAGNOSIS — I70.0: ICD-10-CM

## 2019-10-22 DIAGNOSIS — M79.604: ICD-10-CM

## 2019-10-22 DIAGNOSIS — E78.00: ICD-10-CM

## 2019-10-22 DIAGNOSIS — I70.221: ICD-10-CM

## 2019-10-22 DIAGNOSIS — I10: ICD-10-CM

## 2019-10-22 DIAGNOSIS — I70.212: Primary | ICD-10-CM

## 2019-10-22 PROCEDURE — 93922 UPR/L XTREMITY ART 2 LEVELS: CPT

## 2019-10-22 PROCEDURE — 93970 EXTREMITY STUDY: CPT

## 2019-10-22 PROCEDURE — 93925 LOWER EXTREMITY STUDY: CPT

## 2019-10-22 PROCEDURE — 93978 VASCULAR STUDY: CPT

## 2019-10-23 ENCOUNTER — APPOINTMENT (OUTPATIENT)
Dept: CARDIOLOGY | Facility: MEDICAL CENTER | Age: 79
End: 2019-10-23
Attending: NURSE PRACTITIONER
Payer: MEDICARE

## 2019-10-23 ENCOUNTER — HOSPITAL ENCOUNTER (OUTPATIENT)
Facility: MEDICAL CENTER | Age: 79
End: 2019-10-23
Attending: INTERNAL MEDICINE | Admitting: INTERNAL MEDICINE
Payer: MEDICARE

## 2019-10-23 ENCOUNTER — ANESTHESIA EVENT (OUTPATIENT)
Dept: CARDIOLOGY | Facility: MEDICAL CENTER | Age: 79
End: 2019-10-23
Payer: MEDICARE

## 2019-10-23 ENCOUNTER — ANESTHESIA (OUTPATIENT)
Dept: CARDIOLOGY | Facility: MEDICAL CENTER | Age: 79
End: 2019-10-23
Payer: MEDICARE

## 2019-10-23 VITALS
HEIGHT: 68 IN | HEART RATE: 55 BPM | BODY MASS INDEX: 23.29 KG/M2 | SYSTOLIC BLOOD PRESSURE: 155 MMHG | TEMPERATURE: 98 F | RESPIRATION RATE: 18 BRPM | OXYGEN SATURATION: 93 % | DIASTOLIC BLOOD PRESSURE: 58 MMHG | WEIGHT: 153.66 LBS

## 2019-10-23 DIAGNOSIS — I48.92 ATRIAL FLUTTER, UNSPECIFIED TYPE (HCC): ICD-10-CM

## 2019-10-23 LAB
ALBUMIN SERPL BCP-MCNC: 3.7 G/DL (ref 3.2–4.9)
ALBUMIN/GLOB SERPL: 1.1 G/DL
ALP SERPL-CCNC: 137 U/L (ref 30–99)
ALT SERPL-CCNC: 9 U/L (ref 2–50)
ANION GAP SERPL CALC-SCNC: 13 MMOL/L (ref 0–11.9)
AST SERPL-CCNC: 15 U/L (ref 12–45)
BASOPHILS # BLD AUTO: 0.7 % (ref 0–1.8)
BASOPHILS # BLD: 0.02 K/UL (ref 0–0.12)
BILIRUB SERPL-MCNC: 0.7 MG/DL (ref 0.1–1.5)
BUN SERPL-MCNC: 49 MG/DL (ref 8–22)
CALCIUM SERPL-MCNC: 9.2 MG/DL (ref 8.5–10.5)
CHLORIDE SERPL-SCNC: 102 MMOL/L (ref 96–112)
CO2 SERPL-SCNC: 26 MMOL/L (ref 20–33)
CREAT SERPL-MCNC: 6.39 MG/DL (ref 0.5–1.4)
EKG IMPRESSION: NORMAL
EKG IMPRESSION: NORMAL
EOSINOPHIL # BLD AUTO: 0.08 K/UL (ref 0–0.51)
EOSINOPHIL NFR BLD: 2.8 % (ref 0–6.9)
ERYTHROCYTE [DISTWIDTH] IN BLOOD BY AUTOMATED COUNT: 60.6 FL (ref 35.9–50)
GLOBULIN SER CALC-MCNC: 3.3 G/DL (ref 1.9–3.5)
GLUCOSE SERPL-MCNC: 67 MG/DL (ref 65–99)
HCT VFR BLD AUTO: 35.9 % (ref 42–52)
HGB BLD-MCNC: 11 G/DL (ref 14–18)
IMM GRANULOCYTES # BLD AUTO: 0.01 K/UL (ref 0–0.11)
IMM GRANULOCYTES NFR BLD AUTO: 0.3 % (ref 0–0.9)
INR PPP: 2.53 (ref 0.87–1.13)
LYMPHOCYTES # BLD AUTO: 0.5 K/UL (ref 1–4.8)
LYMPHOCYTES NFR BLD: 17.4 % (ref 22–41)
MCH RBC QN AUTO: 30.9 PG (ref 27–33)
MCHC RBC AUTO-ENTMCNC: 30.6 G/DL (ref 33.7–35.3)
MCV RBC AUTO: 100.8 FL (ref 81.4–97.8)
MONOCYTES # BLD AUTO: 0.33 K/UL (ref 0–0.85)
MONOCYTES NFR BLD AUTO: 11.5 % (ref 0–13.4)
NEUTROPHILS # BLD AUTO: 1.94 K/UL (ref 1.82–7.42)
NEUTROPHILS NFR BLD: 67.3 % (ref 44–72)
NRBC # BLD AUTO: 0 K/UL
NRBC BLD-RTO: 0 /100 WBC
PLATELET # BLD AUTO: 125 K/UL (ref 164–446)
PMV BLD AUTO: 10.5 FL (ref 9–12.9)
POTASSIUM SERPL-SCNC: 4.5 MMOL/L (ref 3.6–5.5)
PROT SERPL-MCNC: 7 G/DL (ref 6–8.2)
PROTHROMBIN TIME: 28.2 SEC (ref 12–14.6)
RBC # BLD AUTO: 3.56 M/UL (ref 4.7–6.1)
SODIUM SERPL-SCNC: 141 MMOL/L (ref 135–145)
WBC # BLD AUTO: 2.9 K/UL (ref 4.8–10.8)

## 2019-10-23 PROCEDURE — 93623 PRGRMD STIMJ&PACG IV RX NFS: CPT | Mod: 26 | Performed by: INTERNAL MEDICINE

## 2019-10-23 PROCEDURE — 93005 ELECTROCARDIOGRAM TRACING: CPT | Performed by: INTERNAL MEDICINE

## 2019-10-23 PROCEDURE — 700111 HCHG RX REV CODE 636 W/ 250 OVERRIDE (IP)

## 2019-10-23 PROCEDURE — 80053 COMPREHEN METABOLIC PANEL: CPT

## 2019-10-23 PROCEDURE — 700111 HCHG RX REV CODE 636 W/ 250 OVERRIDE (IP): Performed by: ANESTHESIOLOGY

## 2019-10-23 PROCEDURE — 700101 HCHG RX REV CODE 250: Performed by: ANESTHESIOLOGY

## 2019-10-23 PROCEDURE — 700101 HCHG RX REV CODE 250

## 2019-10-23 PROCEDURE — 93621 COMP EP EVL L PAC&REC C SINS: CPT | Mod: 26 | Performed by: INTERNAL MEDICINE

## 2019-10-23 PROCEDURE — 85025 COMPLETE CBC W/AUTO DIFF WBC: CPT

## 2019-10-23 PROCEDURE — 93613 INTRACARDIAC EPHYS 3D MAPG: CPT | Performed by: INTERNAL MEDICINE

## 2019-10-23 PROCEDURE — 93010 ELECTROCARDIOGRAM REPORT: CPT | Performed by: INTERNAL MEDICINE

## 2019-10-23 PROCEDURE — 305383 CL-EP ABLATION ATRIAL FLUTTER

## 2019-10-23 PROCEDURE — 85610 PROTHROMBIN TIME: CPT

## 2019-10-23 PROCEDURE — 93653 COMPRE EP EVAL TX SVT: CPT | Performed by: INTERNAL MEDICINE

## 2019-10-23 PROCEDURE — 160002 HCHG RECOVERY MINUTES (STAT)

## 2019-10-23 RX ORDER — LIDOCAINE HYDROCHLORIDE 20 MG/ML
INJECTION, SOLUTION INFILTRATION; PERINEURAL
Status: COMPLETED
Start: 2019-10-23 | End: 2019-10-23

## 2019-10-23 RX ORDER — DIPHENHYDRAMINE HYDROCHLORIDE 50 MG/ML
12.5 INJECTION INTRAMUSCULAR; INTRAVENOUS
Status: DISCONTINUED | OUTPATIENT
Start: 2019-10-23 | End: 2019-10-23 | Stop reason: HOSPADM

## 2019-10-23 RX ORDER — ONDANSETRON 2 MG/ML
INJECTION INTRAMUSCULAR; INTRAVENOUS PRN
Status: DISCONTINUED | OUTPATIENT
Start: 2019-10-23 | End: 2019-10-23 | Stop reason: SURG

## 2019-10-23 RX ORDER — HALOPERIDOL 5 MG/ML
1 INJECTION INTRAMUSCULAR
Status: DISCONTINUED | OUTPATIENT
Start: 2019-10-23 | End: 2019-10-23 | Stop reason: HOSPADM

## 2019-10-23 RX ORDER — SODIUM CHLORIDE, SODIUM LACTATE, POTASSIUM CHLORIDE, CALCIUM CHLORIDE 600; 310; 30; 20 MG/100ML; MG/100ML; MG/100ML; MG/100ML
INJECTION, SOLUTION INTRAVENOUS CONTINUOUS
Status: DISCONTINUED | OUTPATIENT
Start: 2019-10-23 | End: 2019-10-23 | Stop reason: HOSPADM

## 2019-10-23 RX ORDER — ONDANSETRON 2 MG/ML
4 INJECTION INTRAMUSCULAR; INTRAVENOUS
Status: DISCONTINUED | OUTPATIENT
Start: 2019-10-23 | End: 2019-10-23 | Stop reason: HOSPADM

## 2019-10-23 RX ORDER — HEPARIN SODIUM,PORCINE 1000/ML
VIAL (ML) INJECTION
Status: COMPLETED
Start: 2019-10-23 | End: 2019-10-23

## 2019-10-23 RX ORDER — HEPARIN SODIUM 200 [USP'U]/100ML
INJECTION, SOLUTION INTRAVENOUS
Status: COMPLETED
Start: 2019-10-23 | End: 2019-10-23

## 2019-10-23 RX ORDER — ACETAMINOPHEN 325 MG/1
650 TABLET ORAL EVERY 4 HOURS PRN
Status: DISCONTINUED | OUTPATIENT
Start: 2019-10-23 | End: 2019-10-23 | Stop reason: HOSPADM

## 2019-10-23 RX ORDER — ISOPROTERENOL HYDROCHLORIDE 0.2 MG/ML
INJECTION, SOLUTION INTRAVENOUS
Status: COMPLETED
Start: 2019-10-23 | End: 2019-10-23

## 2019-10-23 RX ORDER — MIDAZOLAM HYDROCHLORIDE 1 MG/ML
INJECTION INTRAMUSCULAR; INTRAVENOUS
Status: COMPLETED
Start: 2019-10-23 | End: 2019-10-23

## 2019-10-23 RX ADMIN — GLYCOPYRROLATE 0.4 MG: 0.2 INJECTION INTRAMUSCULAR; INTRAVENOUS at 13:33

## 2019-10-23 RX ADMIN — MIDAZOLAM HYDROCHLORIDE 2 MG: 1 INJECTION, SOLUTION INTRAMUSCULAR; INTRAVENOUS at 13:23

## 2019-10-23 RX ADMIN — EPHEDRINE SULFATE 10 MG: 50 INJECTION INTRAMUSCULAR; INTRAVENOUS; SUBCUTANEOUS at 13:33

## 2019-10-23 RX ADMIN — ISOPROTERENOL HYDROCHLORIDE 200 MCG: 0.2 INJECTION, SOLUTION INTRACARDIAC; INTRAMUSCULAR; INTRAVENOUS; SUBCUTANEOUS at 13:57

## 2019-10-23 RX ADMIN — HEPARIN SODIUM: 200 INJECTION, SOLUTION INTRAVENOUS at 13:15

## 2019-10-23 RX ADMIN — ROCURONIUM BROMIDE 30 MG: 10 INJECTION, SOLUTION INTRAVENOUS at 13:23

## 2019-10-23 RX ADMIN — PROPOFOL 100 MG: 10 INJECTION, EMULSION INTRAVENOUS at 13:23

## 2019-10-23 RX ADMIN — ONDANSETRON 4 MG: 2 INJECTION INTRAMUSCULAR; INTRAVENOUS at 13:23

## 2019-10-23 RX ADMIN — SUGAMMADEX 200 MG: 100 INJECTION, SOLUTION INTRAVENOUS at 14:06

## 2019-10-23 RX ADMIN — FENTANYL CITRATE 100 MCG: 50 INJECTION, SOLUTION INTRAMUSCULAR; INTRAVENOUS at 13:23

## 2019-10-23 RX ADMIN — LIDOCAINE HYDROCHLORIDE: 20 INJECTION, SOLUTION INFILTRATION; PERINEURAL at 13:40

## 2019-10-23 ASSESSMENT — PAIN SCALES - GENERAL: PAIN_LEVEL: 0

## 2019-10-23 NOTE — DISCHARGE INSTRUCTIONS
ACTIVITY: Rest and take it easy for the first 24 hours.  A responsible adult is recommended to remain with you during that time.  It is normal to feel sleepy.  We encourage you to not do anything that requires balance, judgment or coordination.    MILD FLU-LIKE SYMPTOMS ARE NORMAL. YOU MAY EXPERIENCE GENERALIZED MUSCLE ACHES, THROAT IRRITATION, HEADACHE AND/OR SOME NAUSEA.    FOR 24 HOURS DO NOT:  Drive, operate machinery or run household appliances.  Drink beer or alcoholic beverages.   Make important decisions or sign legal documents.    SPECIAL INSTRUCTIONS: keep incision dry for 24 hours    DIET: To avoid nausea, slowly advance diet as tolerated, avoiding spicy or greasy foods for the first day.  Add more substantial food to your diet according to your physician's instructions.  Babies can be fed formula or breast milk as soon as they are hungry.  INCREASE FLUIDS AND FIBER TO AVOID CONSTIPATION.    SURGICAL DRESSING/BATHING: do not shower for 24 hours, may shower tomorrow 10/24/2019    FOLLOW-UP APPOINTMENT:  A follow-up appointment should be arranged with your cardiology at [phone]; call to schedule.    You should CALL YOUR PHYSICIAN if you develop:  Fever greater than 101 degrees F.  Pain not relieved by medication, or persistent nausea or vomiting.  Excessive bleeding (blood soaking through dressing) or unexpected drainage from the wound.  Extreme redness or swelling around the incision site, drainage of pus or foul smelling drainage.  Inability to urinate or empty your bladder within 8 hours.  Problems with breathing or chest pain.    You should call 911 if you develop problems with breathing or chest pain.  If you are unable to contact your doctor or surgical center, you should go to the nearest emergency room or urgent care center.  Physician's telephone #: [phone]    If any questions arise, call your doctor.  If your doctor is not available, please feel free to call the Surgical Center at  [phone].  The Center is open Monday through Friday from 7AM to 7PM.  You can also call the HEALTH HOTLINE open 24 hours/day, 7 days/week and speak to a nurse at [phone], or toll free at [phone].    A registered nurse may call you a few days after your surgery to see how you are doing after your procedure.    MEDICATIONS: Resume taking daily medication.  Take prescribed pain medication with food.  If no medication is prescribed, you may take non-aspirin pain medication if needed.  PAIN MEDICATION CAN BE VERY CONSTIPATING.  Take a stool softener or laxative such as senokot, pericolace, or milk of magnesia if needed.      If your physician has prescribed pain medication that includes Acetaminophen (Tylenol), do not take additional Acetaminophen (Tylenol) while taking the prescribed medication.    Depression / Suicide Risk    As you are discharged from this [facility] facility, it is important to learn how to keep safe from harming yourself.    Recognize the warning signs:  · Abrupt changes in personality, positive or negative- including increase in energy   · Giving away possessions  · Change in eating patterns- significant weight changes-  positive or negative  · Change in sleeping patterns- unable to sleep or sleeping all the time   · Unwillingness or inability to communicate  · Depression  · Unusual sadness, discouragement and loneliness  · Talk of wanting to die  · Neglect of personal appearance   · Rebelliousness- reckless behavior  · Withdrawal from people/activities they love  · Confusion- inability to concentrate     If you or a loved one observes any of these behaviors or has concerns about self-harm, here's what you can do:  · Talk about it- your feelings and reasons for harming yourself  · Remove any means that you might use to hurt yourself (examples: pills, rope, extension cords, firearm)  · Get professional help from the community (Mental Health, Substance Abuse, psychological  counseling)  · Do not be alone:Call your Safe Contact- someone whom you trust who will be there for you.  · Call your local CRISIS HOTLINE 933-1746 or 193-430-9296  · Call your local Children's Mobile Crisis Response Team [place] (015) 117-1194 or www.[employer]  · Call the toll free National Suicide Prevention Hotlines   · National Suicide Prevention Lifeline 321-241-FNLY (8904)  · [employer] Network 800-SUICIDE (017-6748)    POST ANGIOGRAM  General Care Instructions  • Maintain a bandage over the incision site for 24 hours.  It's normal to find a small bruise or dime-sized lump at the insertion site. This should disappear within a few weeks.  • Do not apply lotions or powders to the site.  Do not immerse the catheter insertion site in water (bathtub/swimming) for five days. It is ok to shower 24 hours after the procedure.  • You may resume your normal diet immediately; on the day of your procedure, drink 6-10 glasses of water to help flush the contrast liquid out of your system.  • If the doctor inserted the catheter in through your groin:  o Walking short distances on a flat surface is OK. Limit going up/down stairs for the first 2 days.  o DO NOT do yard work, drive, squat, lift heavy objects, or play sports for 2 days; or until your health care provider tells you it is OK.  • If the doctor inserted the catheter in your arm:  o For 24 hours, DO NOT lift anything heavier than 10 pounds (approximately a gallon of milk). DO NOT do any heavy pushing, pulling, or twisting.    Medications  • If your current medications need to be changed, you will be provided with an updated list of your medications prior to discharge.  • If you take warfarin (Coumadin), resume taking your usual dose the evening after the procedure.  • DO NOT STOP taking prescribed blood thinning (anti-platelet) medications unless instructed by your cardiologist.  These medications include:  o Aspirin, Clopidogrel (Plavix),  Ticagrelor (Brilinta), or Prasugrel (Effient)   • If you take one of the following anticoagulants, RESUME 24 HOURS after your procedure:  o Apixiban (Eliquis), Rivaroxaban (Xarelto), Dabigatran (Pradaxa), Edoxaban (Savaysa)  • If you take metformin (Glucophage), RESUME 48 HOURS after your procedure.    When to call your healthcare provider  Call your cardiologist right away at [phone] if you have any of the following:  ?  Problems/Concerns taking any of your prescribed heart medicines.  ?  The insertion site has increasing pain, swelling, redness, bleeding, or drainage.  ?  Your arm or leg below where the insertion site changes color, is cool, or is numb.  ?  You have chest pain or shortness of breath that does not go away with rest.  ?  Your pulse feels irregular -- very slow (less than 60 beats/minute) or very fast (over 100 beats/minute).  ?  You have dizziness, fainting, or you are very tired.  ?  You are coughing up blood or yellow or green mucus.  ?  You have chills or a fever over 101°F (38.3°C).   ?  If there is bleeding at the catheter insertion site, apply pressure for 10 minutes.  If bleeding persists, call 911, and continue to hold pressure until advanced medical support arrives.

## 2019-10-23 NOTE — ANESTHESIA QCDR
2019 [facility] Clinical Data Registry (for Quality Improvement)     Postoperative nausea/vomiting risk protocol (Adult = 18 yrs and Pediatric 3-17 yrs)- (430 and 463)  General inhalation anesthetic (NOT TIVA) with PONV risk factors: No  Provision of anti-emetic therapy with at least 2 different classes of agents: N/A  Patient DID NOT receive anti-emetic therapy and reason is documented in Medical Record: N/A    Multimodal Pain Management- (AQI59)  Patient undergoing Elective Surgery (i.e. Outpatient, or ASC, or Prescheduled Surgery prior to Hospital Admission): No  Use of Multimodal Pain Management, two or more drugs and/or interventions, NOT including systemic opioids: N/A  Exception: Documented allergy to multiple classes of analgesics: N/A    PACU assessment of acute postoperative pain prior to Anesthesia Care End- Applies to Patients Age = 18- (ABG7)  Initial PACU pain score is which of the following: < 7/10  Patient unable to report pain score: N/A    Post-anesthetic transfer of care checklist/protocol to PACU/ICU- (426 and 427)  Upon conclusion of case, patient transferred to which of the following locations: PACU/Non-ICU  Use of transfer checklist/protocol: Yes  Exclusion: Service Performed in Patient Hospital Room (and thus did not require transfer): N/A    PACU Reintubation- (AQI31)  General anesthesia requiring endotracheal intubation (ETT) along with subsequent extubation in OR or PACU: No  Required reintubation in the PACU: N/A  Extubation was a planned trial documented in the medical record prior to removal of the original airway device: N/A    Unplanned admission to ICU related to anesthesia service up through end of PACU care- (MD51)  Unplanned admission to ICU (not initially anticipated at anesthesia start time): No

## 2019-10-23 NOTE — ANESTHESIA PREPROCEDURE EVALUATION
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[facility])   (+) Moderate aortic stenosis   (+) PAD (peripheral artery disease) ([facility])      GI   (+) Acute gastric ulcer         (+) ESRD (end stage renal disease) on dialysis ([facility])   (+) Hyperkalemia, diminished renal excretion   (+) Renal cyst   (+) Uremia      Other   (+) Uric acid arthropathy       Physical Exam    Airway   Mallampati: II  TM distance: >3 FB  Neck ROM: full       Cardiovascular - normal exam  Rhythm: regular  Rate: normal  (-) murmur     Dental - normal exam         Pulmonary - normal exam  Breath sounds clear to auscultation     Abdominal    Neurological - normal exam                 Anesthesia Plan    ASA 3   ASA physical status 3 criteria: ESRD undergoing regularly scheduled dialysis    Plan - general       Airway plan will be ETT        Induction: intravenous    Postoperative Plan: Postoperative administration of opioids is intended.    Pertinent diagnostic labs and testing reviewed    Informed Consent:    Anesthetic plan and risks discussed with patient.    Use of blood products discussed with: patient whom consented to blood products.

## 2019-10-23 NOTE — ANESTHESIA PROCEDURE NOTES
Airway  Date/Time: 10/23/2019 1:24 PM  Performed by: Carlos Eduardo Pizano M.D.  Authorized by: Carlos Eduardo Pizano M.D.     Location:  OR  Urgency:  Elective  Indications for Airway Management:  Anesthesia  Spontaneous Ventilation: absent    Sedation Level:  Deep  Preoxygenated: Yes    Patient Position:  Sniffing  Final Airway Type:  Endotracheal airway  Final Endotracheal Airway:  ETT  Cuffed: Yes    Technique Used for Successful ETT Placement:  Direct laryngoscopy  Insertion Site:  Oral  Blade Type:  Ortega  Laryngoscope Blade/Videolaryngoscope Blade Size:  2  ETT Size (mm):  8.0  Measured from:  Teeth  ETT to Teeth (cm):  21  Placement Verified by: auscultation and capnometry    Cormack-Lehane Classification:  Grade I - full view of glottis  Number of Attempts at Approach:  1

## 2019-10-23 NOTE — OR NURSING
1429 Pt report received from cath lab RN, pt brought over on a gurney by cath lab RN, report given by RN and anesthesiologist, pt placed on Tele monitor, VSS. EKG called for follow up EKG. Right groin site is clean, dry and soft, dressing intact.     1445 Pt groin site clean, dry and soft, no C/O pain     1500 Pt groin site clean, dry and soft, no C/O pain     1515 Pt groin site clean, dry and soft, no C/O pain     1530 Pt groin site clean, dry and soft, no C/O pain     1745 pt sitting up in bed groin site clean dry and soft    1800 ambulated pt in halls no S/S of bleeding.       1810  pt given D/C instructions, pt verbalized understanding. Pt was given information post angiogram care. Pt going home with family in wheelchair.

## 2019-10-23 NOTE — ANESTHESIA POSTPROCEDURE EVALUATION
Patient: Parmjit Castelan    Procedure Summary     Date:  10/23/19 Room / Location:  [facility] CATH LAB [facility]    Anesthesia Start:  1319 Anesthesia Stop:  1433    Procedure:  CL-EP ABLATION ATRIAL FLUTTER Diagnosis:       Atrial flutter, unspecified type (HCC)      Typical atrial flutter      (CL-EP ABLATION ATRIAL FLUTTER, EC-JUSTIN W/ CONT with anesthesia and emily/Dr. Grissom )    Scheduled Providers:  Karon Grissom M.D. Responsible Provider:  Carlos Eduardo Pizano M.D.    Anesthesia Type:  general ASA Status:  3          Final Anesthesia Type: general  Last vitals  BP   Blood Pressure : (!) 177/50    Temp   36.2 °C (97.1 °F)    Pulse   Pulse: (!) 57   Resp   18    SpO2   93 %      Anesthesia Post Evaluation    Patient location during evaluation: PACU  Patient participation: complete - patient participated  Level of consciousness: awake and alert  Pain score: 0    Airway patency: patent  Anesthetic complications: no  Cardiovascular status: hemodynamically stable  Respiratory status: acceptable  Hydration status: euvolemic    PONV: none           Nurse Pain Score: 0 (NPRS)

## 2019-10-23 NOTE — OP REPORT
[facility] EP PROCEDURE NOTE    Procedure(s) Performed:   Supraventricular tachycardia ablation (atrial flutter ablation)   Electrophysiology Study  Electroanatomical 3D mapping  CS/LA pace and record  Programmed stimulation with IV drug infusion    Indication(s):  Typical atrial flutter    : Karon Grissom M.D.    Assistant(s): None    Anesthesia: General anesthesia    Specimen(s) Removed: None    Estimated Blood Loss:  20cc    Complications:  None    Description of Procedure:    After informed written consent, the patient was brought to the EP lab in the fasting, non-sedated state. The patient was prepped and draped in the usual sterile fashion. Femoral venous access was obtained using the modified Seldinger technique. In the right femoral vein, 2 sheaths (6, 8 Fr) were inserted over 0.035” guidewires. A deflectable decapolar catheter was advanced to the CS position. A duodecapolar halo catheter was advanced into the right atrium and positioned along the tricuspid annulus. One 8Fr saline irrigated ablation catheter with 3.5mm distal electrode and location sensor for the CARTO system ([employer] ST SF) was inserted into an 8Fr sheath (RAMP) for electroanatomical 3D mapping and radiofrequency ablation of the cavo-tricuspid isthmus.     At the end of the procedure, the catheter and sheaths were removed, and hemostasis was achieved by manual compression. Following recovery from anesthesia, the patient was transferred to the PACU in good condition.    Baseline Rhythm:   Sinus CL 1181 msec    Intervals:   msec   HV 55 msec  AVBCL 320 msec.     Mapping:  Electroanatomical 3D (CARTO FAM) map of CS and right atrium around the cavotricuspid isthmus was created during CS pacing.    Ablation:  Radiofrequency energy was applied using 3.5 mmsaline irrigated catheter, power 40 W, total 9 RF applications, RF time 354 seconds to create a cavotricuspid isthmus line between the tricuspid annulus and  Eustachian ridge/inferior vena cava to produce bidirectional isthmus conduction block.    Post Ablation Testin. No inducible arrhythmia with CS pacing post ablation. This was done with burst pacing down to 280 msec along with single and double atrial extra-stimuli down to AVNERP, on/off isuprel infusion at 4 mcg/min.  2. Trans-isthmus conduction time pacing from proximal CS >200 msec.  3. Trans-isthmus conduction time pacing from lateral to the isthmus line >200 msec.    Fluoroscopy Time: 1.4 minutes    Impressions/Plan:   1. History of typical right atrial flutter.  2. Successful catheter mediated ablation of right atrial flutter.  3. Admit to monitored bed overnight.

## 2019-10-24 ENCOUNTER — APPOINTMENT (OUTPATIENT)
Dept: RADIOLOGY | Facility: MEDICAL CENTER | Age: 79
DRG: 189 | End: 2019-10-24
Attending: EMERGENCY MEDICINE
Payer: MEDICARE

## 2019-10-24 ENCOUNTER — APPOINTMENT (OUTPATIENT)
Dept: WOUND CARE | Facility: MEDICAL CENTER | Age: 79
End: 2019-10-24
Attending: PHYSICIAN ASSISTANT
Payer: MEDICARE

## 2019-10-24 ENCOUNTER — APPOINTMENT (OUTPATIENT)
Dept: CARDIOLOGY | Facility: MEDICAL CENTER | Age: 79
DRG: 189 | End: 2019-10-24
Attending: INTERNAL MEDICINE
Payer: MEDICARE

## 2019-10-24 ENCOUNTER — HOSPITAL ENCOUNTER (INPATIENT)
Facility: MEDICAL CENTER | Age: 79
LOS: 3 days | DRG: 189 | End: 2019-10-27
Attending: EMERGENCY MEDICINE | Admitting: INTERNAL MEDICINE
Payer: MEDICARE

## 2019-10-24 DIAGNOSIS — J90 PLEURAL EFFUSION, RIGHT: ICD-10-CM

## 2019-10-24 PROBLEM — J96.01 ACUTE HYPOXEMIC RESPIRATORY FAILURE (HCC): Status: ACTIVE | Noted: 2019-10-24

## 2019-10-24 PROBLEM — Z86.79 HISTORY OF ATRIAL FLUTTER: Status: ACTIVE | Noted: 2019-06-12

## 2019-10-24 LAB
ALBUMIN SERPL BCP-MCNC: 3.8 G/DL (ref 3.2–4.9)
ALBUMIN/GLOB SERPL: 1.1 G/DL
ALP SERPL-CCNC: 159 U/L (ref 30–99)
ALT SERPL-CCNC: 10 U/L (ref 2–50)
ANION GAP SERPL CALC-SCNC: 22 MMOL/L (ref 0–11.9)
ANISOCYTOSIS BLD QL SMEAR: ABNORMAL
AST SERPL-CCNC: 18 U/L (ref 12–45)
BASOPHILS # BLD AUTO: 0.4 % (ref 0–1.8)
BASOPHILS # BLD: 0.03 K/UL (ref 0–0.12)
BILIRUB SERPL-MCNC: 0.8 MG/DL (ref 0.1–1.5)
BUN SERPL-MCNC: 55 MG/DL (ref 8–22)
CALCIUM SERPL-MCNC: 10.4 MG/DL (ref 8.4–10.2)
CHLORIDE SERPL-SCNC: 92 MMOL/L (ref 96–112)
CO2 SERPL-SCNC: 23 MMOL/L (ref 20–33)
COMMENT 1642: NORMAL
CREAT SERPL-MCNC: 8.13 MG/DL (ref 0.5–1.4)
EOSINOPHIL # BLD AUTO: 0.03 K/UL (ref 0–0.51)
EOSINOPHIL NFR BLD: 0.4 % (ref 0–6.9)
ERYTHROCYTE [DISTWIDTH] IN BLOOD BY AUTOMATED COUNT: 58.4 FL (ref 35.9–50)
FLUAV RNA SPEC QL NAA+PROBE: NEGATIVE
FLUBV RNA SPEC QL NAA+PROBE: NEGATIVE
GLOBULIN SER CALC-MCNC: 3.5 G/DL (ref 1.9–3.5)
GLUCOSE SERPL-MCNC: 109 MG/DL (ref 65–99)
HCT VFR BLD AUTO: 37.4 % (ref 42–52)
HGB BLD-MCNC: 11.1 G/DL (ref 14–18)
IMM GRANULOCYTES # BLD AUTO: 0.03 K/UL (ref 0–0.11)
IMM GRANULOCYTES NFR BLD AUTO: 0.4 % (ref 0–0.9)
INR PPP: 3.06 (ref 0.87–1.13)
LACTATE BLD-SCNC: 2.9 MMOL/L (ref 0.5–2)
LACTATE BLD-SCNC: 3.8 MMOL/L (ref 0.5–2)
LACTATE BLD-SCNC: 4.2 MMOL/L (ref 0.5–2)
LV EJECT FRACT  99904: 60
LV EJECT FRACT MOD 2C 99903: 59.4
LV EJECT FRACT MOD 4C 99902: 62.41
LV EJECT FRACT MOD BP 99901: 61.4
LYMPHOCYTES # BLD AUTO: 0.38 K/UL (ref 1–4.8)
LYMPHOCYTES NFR BLD: 4.6 % (ref 22–41)
MACROCYTES BLD QL SMEAR: ABNORMAL
MCH RBC QN AUTO: 29.4 PG (ref 27–33)
MCHC RBC AUTO-ENTMCNC: 29.7 G/DL (ref 33.7–35.3)
MCV RBC AUTO: 99.2 FL (ref 81.4–97.8)
MONOCYTES # BLD AUTO: 0.36 K/UL (ref 0–0.85)
MONOCYTES NFR BLD AUTO: 4.4 % (ref 0–13.4)
NEUTROPHILS # BLD AUTO: 7.38 K/UL (ref 1.82–7.42)
NEUTROPHILS NFR BLD: 89.8 % (ref 44–72)
NRBC # BLD AUTO: 0 K/UL
NRBC BLD-RTO: 0 /100 WBC
NT-PROBNP SERPL IA-MCNC: ABNORMAL PG/ML (ref 0–125)
PLATELET # BLD AUTO: 213 K/UL (ref 164–446)
PLATELET BLD QL SMEAR: NORMAL
PMV BLD AUTO: 10 FL (ref 9–12.9)
POTASSIUM SERPL-SCNC: 4.6 MMOL/L (ref 3.6–5.5)
PROCALCITONIN SERPL-MCNC: 2.43 NG/ML
PROT SERPL-MCNC: 7.3 G/DL (ref 6–8.2)
PROTHROMBIN TIME: 32.5 SEC (ref 12–14.6)
RBC # BLD AUTO: 3.77 M/UL (ref 4.7–6.1)
RBC BLD AUTO: PRESENT
SODIUM SERPL-SCNC: 137 MMOL/L (ref 135–145)
TROPONIN T SERPL-MCNC: 1033 NG/L (ref 6–19)
WBC # BLD AUTO: 8.2 K/UL (ref 4.8–10.8)

## 2019-10-24 PROCEDURE — 84145 PROCALCITONIN (PCT): CPT

## 2019-10-24 PROCEDURE — 700101 HCHG RX REV CODE 250: Performed by: EMERGENCY MEDICINE

## 2019-10-24 PROCEDURE — 84484 ASSAY OF TROPONIN QUANT: CPT

## 2019-10-24 PROCEDURE — 700111 HCHG RX REV CODE 636 W/ 250 OVERRIDE (IP): Performed by: INTERNAL MEDICINE

## 2019-10-24 PROCEDURE — 71045 X-RAY EXAM CHEST 1 VIEW: CPT

## 2019-10-24 PROCEDURE — 94640 AIRWAY INHALATION TREATMENT: CPT

## 2019-10-24 PROCEDURE — 99291 CRITICAL CARE FIRST HOUR: CPT | Performed by: INTERNAL MEDICINE

## 2019-10-24 PROCEDURE — 700105 HCHG RX REV CODE 258: Performed by: INTERNAL MEDICINE

## 2019-10-24 PROCEDURE — 93306 TTE W/DOPPLER COMPLETE: CPT

## 2019-10-24 PROCEDURE — 770022 HCHG ROOM/CARE - ICU (200)

## 2019-10-24 PROCEDURE — 700111 HCHG RX REV CODE 636 W/ 250 OVERRIDE (IP): Performed by: EMERGENCY MEDICINE

## 2019-10-24 PROCEDURE — 700101 HCHG RX REV CODE 250: Performed by: INTERNAL MEDICINE

## 2019-10-24 PROCEDURE — 87502 INFLUENZA DNA AMP PROBE: CPT

## 2019-10-24 PROCEDURE — 99291 CRITICAL CARE FIRST HOUR: CPT

## 2019-10-24 PROCEDURE — 36415 COLL VENOUS BLD VENIPUNCTURE: CPT

## 2019-10-24 PROCEDURE — 5A1D70Z PERFORMANCE OF URINARY FILTRATION, INTERMITTENT, LESS THAN 6 HOURS PER DAY: ICD-10-PCS | Performed by: INTERNAL MEDICINE

## 2019-10-24 PROCEDURE — 99223 1ST HOSP IP/OBS HIGH 75: CPT | Performed by: INTERNAL MEDICINE

## 2019-10-24 PROCEDURE — 85025 COMPLETE CBC W/AUTO DIFF WBC: CPT

## 2019-10-24 PROCEDURE — 90935 HEMODIALYSIS ONE EVALUATION: CPT

## 2019-10-24 PROCEDURE — 83605 ASSAY OF LACTIC ACID: CPT | Mod: 91

## 2019-10-24 PROCEDURE — 87070 CULTURE OTHR SPECIMN AEROBIC: CPT

## 2019-10-24 PROCEDURE — 700102 HCHG RX REV CODE 250 W/ 637 OVERRIDE(OP): Performed by: INTERNAL MEDICINE

## 2019-10-24 PROCEDURE — 93306 TTE W/DOPPLER COMPLETE: CPT | Mod: 26 | Performed by: INTERNAL MEDICINE

## 2019-10-24 PROCEDURE — 94760 N-INVAS EAR/PLS OXIMETRY 1: CPT

## 2019-10-24 PROCEDURE — 83880 ASSAY OF NATRIURETIC PEPTIDE: CPT

## 2019-10-24 PROCEDURE — 94002 VENT MGMT INPAT INIT DAY: CPT

## 2019-10-24 PROCEDURE — 96365 THER/PROPH/DIAG IV INF INIT: CPT

## 2019-10-24 PROCEDURE — 85610 PROTHROMBIN TIME: CPT

## 2019-10-24 PROCEDURE — A9270 NON-COVERED ITEM OR SERVICE: HCPCS | Performed by: INTERNAL MEDICINE

## 2019-10-24 PROCEDURE — 87205 SMEAR GRAM STAIN: CPT

## 2019-10-24 PROCEDURE — 87040 BLOOD CULTURE FOR BACTERIA: CPT

## 2019-10-24 PROCEDURE — 700105 HCHG RX REV CODE 258: Performed by: EMERGENCY MEDICINE

## 2019-10-24 PROCEDURE — 80053 COMPREHEN METABOLIC PANEL: CPT

## 2019-10-24 RX ORDER — LEVALBUTEROL INHALATION SOLUTION 1.25 MG/3ML
1.25 SOLUTION RESPIRATORY (INHALATION)
Status: DISCONTINUED | OUTPATIENT
Start: 2019-10-24 | End: 2019-10-25

## 2019-10-24 RX ORDER — PRAVASTATIN SODIUM 20 MG
20 TABLET ORAL NIGHTLY
Status: DISCONTINUED | OUTPATIENT
Start: 2019-10-24 | End: 2019-10-27 | Stop reason: HOSPADM

## 2019-10-24 RX ORDER — GABAPENTIN 300 MG/1
300 CAPSULE ORAL 3 TIMES DAILY
Status: DISCONTINUED | OUTPATIENT
Start: 2019-10-24 | End: 2019-10-27 | Stop reason: HOSPADM

## 2019-10-24 RX ORDER — HEPARIN SODIUM 1000 [USP'U]/ML
3800 INJECTION, SOLUTION INTRAVENOUS; SUBCUTANEOUS
Status: DISCONTINUED | OUTPATIENT
Start: 2019-10-24 | End: 2019-10-27 | Stop reason: HOSPADM

## 2019-10-24 RX ORDER — TAMSULOSIN HYDROCHLORIDE 0.4 MG/1
0.4 CAPSULE ORAL
Status: DISCONTINUED | OUTPATIENT
Start: 2019-10-25 | End: 2019-10-27 | Stop reason: HOSPADM

## 2019-10-24 RX ORDER — LEVALBUTEROL INHALATION SOLUTION 1.25 MG/3ML
1.25 SOLUTION RESPIRATORY (INHALATION)
Status: DISCONTINUED | OUTPATIENT
Start: 2019-10-24 | End: 2019-10-27 | Stop reason: HOSPADM

## 2019-10-24 RX ORDER — AMOXICILLIN 250 MG
2 CAPSULE ORAL 2 TIMES DAILY
Status: DISCONTINUED | OUTPATIENT
Start: 2019-10-24 | End: 2019-10-27 | Stop reason: HOSPADM

## 2019-10-24 RX ORDER — HEPARIN SODIUM 5000 [USP'U]/ML
5000 INJECTION, SOLUTION INTRAVENOUS; SUBCUTANEOUS EVERY 12 HOURS
Status: DISCONTINUED | OUTPATIENT
Start: 2019-10-24 | End: 2019-10-24

## 2019-10-24 RX ORDER — POLYETHYLENE GLYCOL 3350 17 G/17G
1 POWDER, FOR SOLUTION ORAL
Status: DISCONTINUED | OUTPATIENT
Start: 2019-10-24 | End: 2019-10-27 | Stop reason: HOSPADM

## 2019-10-24 RX ORDER — ROPINIROLE 1 MG/1
1 TABLET, FILM COATED ORAL 2 TIMES DAILY
Status: DISCONTINUED | OUTPATIENT
Start: 2019-10-24 | End: 2019-10-27 | Stop reason: HOSPADM

## 2019-10-24 RX ORDER — DOXYCYCLINE 100 MG/1
100 TABLET ORAL EVERY 12 HOURS
Status: DISCONTINUED | OUTPATIENT
Start: 2019-10-24 | End: 2019-10-27 | Stop reason: HOSPADM

## 2019-10-24 RX ORDER — HEPARIN SODIUM 1000 [USP'U]/ML
2000 INJECTION, SOLUTION INTRAVENOUS; SUBCUTANEOUS
Status: DISCONTINUED | OUTPATIENT
Start: 2019-10-24 | End: 2019-10-27 | Stop reason: HOSPADM

## 2019-10-24 RX ORDER — GABAPENTIN 300 MG/1
1 CAPSULE ORAL 3 TIMES DAILY
COMMUNITY
Start: 2019-10-16 | End: 2019-12-17

## 2019-10-24 RX ORDER — BISACODYL 10 MG
10 SUPPOSITORY, RECTAL RECTAL
Status: DISCONTINUED | OUTPATIENT
Start: 2019-10-24 | End: 2019-10-27 | Stop reason: HOSPADM

## 2019-10-24 RX ORDER — OMEPRAZOLE 20 MG/1
40 CAPSULE, DELAYED RELEASE ORAL EVERY MORNING
Status: DISCONTINUED | OUTPATIENT
Start: 2019-10-25 | End: 2019-10-27 | Stop reason: HOSPADM

## 2019-10-24 RX ADMIN — LEVALBUTEROL HYDROCHLORIDE 1.25 MG: 1.25 SOLUTION RESPIRATORY (INHALATION) at 10:47

## 2019-10-24 RX ADMIN — GABAPENTIN 300 MG: 300 CAPSULE ORAL at 17:19

## 2019-10-24 RX ADMIN — HEPARIN SODIUM 3800 UNITS: 1000 INJECTION, SOLUTION INTRAVENOUS; SUBCUTANEOUS at 15:38

## 2019-10-24 RX ADMIN — LEVALBUTEROL HYDROCHLORIDE 1.25 MG: 1.25 SOLUTION RESPIRATORY (INHALATION) at 09:22

## 2019-10-24 RX ADMIN — DOXYCYCLINE 100 MG: 100 TABLET, FILM COATED ORAL at 13:14

## 2019-10-24 RX ADMIN — ROPINIROLE HYDROCHLORIDE 1 MG: 1 TABLET, FILM COATED ORAL at 17:19

## 2019-10-24 RX ADMIN — METRONIDAZOLE 500 MG: 500 INJECTION, SOLUTION INTRAVENOUS at 13:54

## 2019-10-24 RX ADMIN — GABAPENTIN 300 MG: 300 CAPSULE ORAL at 13:14

## 2019-10-24 RX ADMIN — PRAVASTATIN SODIUM 20 MG: 20 TABLET ORAL at 21:14

## 2019-10-24 RX ADMIN — LEVALBUTEROL HYDROCHLORIDE 1.25 MG: 1.25 SOLUTION RESPIRATORY (INHALATION) at 22:54

## 2019-10-24 RX ADMIN — AMPICILLIN SODIUM AND SULBACTAM SODIUM 3 G: 2; 1 INJECTION, POWDER, FOR SOLUTION INTRAMUSCULAR; INTRAVENOUS at 10:19

## 2019-10-24 RX ADMIN — METRONIDAZOLE 500 MG: 500 INJECTION, SOLUTION INTRAVENOUS at 21:14

## 2019-10-24 RX ADMIN — HEPARIN SODIUM 2000 UNITS: 1000 INJECTION, SOLUTION INTRAVENOUS; SUBCUTANEOUS at 13:10

## 2019-10-24 RX ADMIN — CEFTRIAXONE SODIUM 2 G: 2 INJECTION, POWDER, FOR SOLUTION INTRAMUSCULAR; INTRAVENOUS at 13:15

## 2019-10-24 RX ADMIN — LEVALBUTEROL HYDROCHLORIDE 1.25 MG: 1.25 SOLUTION RESPIRATORY (INHALATION) at 14:05

## 2019-10-24 RX ADMIN — LEVALBUTEROL HYDROCHLORIDE 1.25 MG: 1.25 SOLUTION RESPIRATORY (INHALATION) at 18:28

## 2019-10-24 ASSESSMENT — CHA2DS2 SCORE
SEX: MALE
CHA2DS2 VASC SCORE: 6
CHF OR LEFT VENTRICULAR DYSFUNCTION: YES
AGE 65 TO 74: YES
PRIOR STROKE OR TIA OR THROMBOEMBOLISM: NO
VASCULAR DISEASE: YES
HYPERTENSION: YES
AGE 75 OR GREATER: YES
DIABETES: NO

## 2019-10-24 ASSESSMENT — ENCOUNTER SYMPTOMS
EYE PAIN: 0
MYALGIAS: 0
ABDOMINAL PAIN: 0
LOSS OF CONSCIOUSNESS: 0
COUGH: 1
WEIGHT LOSS: 0
FOCAL WEAKNESS: 0
NAUSEA: 0
HEADACHES: 0
VOMITING: 0
WHEEZING: 0
SHORTNESS OF BREATH: 1
SINUS PAIN: 0
EYE DISCHARGE: 0
FEVER: 0
DIARRHEA: 0
CHILLS: 0
DIZZINESS: 0
SENSORY CHANGE: 0
SORE THROAT: 0
PSYCHIATRIC NEGATIVE: 1
SPUTUM PRODUCTION: 0
STRIDOR: 0
ORTHOPNEA: 0

## 2019-10-24 ASSESSMENT — LIFESTYLE VARIABLES
TOTAL SCORE: 0
ALCOHOL_USE: NO
CONSUMPTION TOTAL: NEGATIVE
TOTAL SCORE: 0
AVERAGE NUMBER OF DAYS PER WEEK YOU HAVE A DRINK CONTAINING ALCOHOL: 0
HAVE YOU EVER FELT YOU SHOULD CUT DOWN ON YOUR DRINKING: NO
HOW MANY TIMES IN THE PAST YEAR HAVE YOU HAD 5 OR MORE DRINKS IN A DAY: 0
TOTAL SCORE: 0
ON A TYPICAL DAY WHEN YOU DRINK ALCOHOL HOW MANY DRINKS DO YOU HAVE: 0
EVER HAD A DRINK FIRST THING IN THE MORNING TO STEADY YOUR NERVES TO GET RID OF A HANGOVER: NO
EVER_SMOKED: YES
HAVE PEOPLE ANNOYED YOU BY CRITICIZING YOUR DRINKING: NO
EVER FELT BAD OR GUILTY ABOUT YOUR DRINKING: NO

## 2019-10-24 ASSESSMENT — PULMONARY FUNCTION TESTS
EPAP_CMH2O: 6

## 2019-10-24 NOTE — PROGRESS NOTES
Inpatient Anticoagulation Service Note    Date: 10/24/2019    Reason for Anticoagulation: Atrial Fibrillation   Target INR: 2.0 to 3.0  [id number] VASc Score: 6      Hemoglobin Value: (!) 11.1  Hematocrit Value: (!) 37.4    INR from last 7 days     Date/Time INR Value    10/24/19 1330  (!) 3.06        Dose from last 7 days     Date/Time Dose (mg)    10/24/19 0858  0        Significant Interactions: Antibiotics, Flagyl, Proton Pump Inhibitor (If less than 5 days and overlap therapy discontinued -- document reason (i.e. Bleed Risk))    (If still on overlap therapy, if No -- document reason (i.e. Bleed Risk))    Comments: (Home regimen: Warfarin 2.5 mg M/F; 5 mg all other days)    Plan:  Hold today for supratherapeutic INR     Pharmacist suggested discharge dosing: resume home regimen     Jerzy Vasquez, PharmD

## 2019-10-24 NOTE — CONSULTS
DATE OF SERVICE:  10/24/2019    REQUESTING PHYSICIAN:  Dr. Aviles from the emergency room service.    REASON FOR CONSULTATION:  Management of respiratory failure in the setting of   end-stage renal disease, assessing the need for urgent dialysis.    The patient seen and examined, medical record reviewed.    HISTORY OF PRESENT ILLNESS:  The patient is an unfortunate 79-year-old   gentleman who is very well known to our service.  He has a history of   end-stage renal disease, undergoes hemodialysis on a Monday, Wednesday, and   Friday, presented to the hospital earlier today with shortness of breath, the   patient missed his hemodialysis yesterday because of cardiac ablation   procedure, presented this morning, he was short of breath, hypoxic with   saturation at 83% on room air.  The patient has been admitted to the intensive   care unit.  We were called to manage his respiratory failure, assessing the   need for urgent dialysis.    PAST MEDICAL HISTORY:  Significant for:  1.  End-stage renal disease.  2.  Atrial flutter.  3.  Aortic stenosis.    ALLERGIES:  No known drug allergies.    SOCIAL HISTORY:  The patient had remote history of smoking.  He quit in 2009.    FAMILY HISTORY:  Positive for hypertension.    MEDICATIONS:  Reviewed.    REVIEW OF SYSTEMS:  The patient is short of breath.  He has dyspnea on   exertion.  No fever, no chills, no cough and no hemoptysis.  All other review   of systems were negative except outlined in the history of present illness.    PHYSICAL EXAMINATION:  GENERAL:  The patient is in mild respiratory distress.  VITAL SIGNS:  Showed blood pressure of 170/77, heart rate was 66, respiratory   rate was 18.  HEENT:  Normocephalic, atraumatic.  Sclerae anicteric.  Pupils are reactive.    Nose is normal.  Mucous membranes moist.  NECK:  No lymphadenopathy, no JVD, no thyroid mass.  CHEST:  Normal.  LUNGS:  Few rales at the bases.  HEART:  S1, S2.  ABDOMEN:  Soft, nontender, no  hepatosplenomegaly.  There is no inguinal   lymphadenopathy.  EXTREMITIES:  There is +1 edema.  SKIN:  No skin rashes.  NEUROLOGIC:  The patient is alert and oriented x3.  MOOD:  Patient is anxious.    LABORATORY DATA:  His recent labs from today were reviewed.    DIAGNOSTIC DATA:  He also had a chest x-ray, which I reviewed the image   myself, showed pulmonary edema.    ASSESSMENT AND PLAN:  1.  End-stage renal disease.  2.  Respiratory failure.  3.  Pulmonary edema.  4.  Uncontrolled hypertension.  5.  Anemia.  6.  Fluid overload.    PLAN:  1.  We will plan on urgent dialysis to manage his fluid overload and   respiratory failure.  2.  We will plan another dialysis tomorrow.  3.  Renally dose all medications.  4.  Avoid nephrotoxin.  5.  Renal diet.  6.  Prognosis is guarded.    Plan discussed in detail with Dr. Aviles who I would like to thank for   consulting this very interesting case.       ____________________________________     FADI NAJJAR, MD FN / DELANO    DD:  10/24/2019 15:13:04  DT:  10/24/2019 16:35:11    D#:  [id number]  Job#:  [id number]

## 2019-10-24 NOTE — RESPIRATORY CARE
Respiratory Therapy Update        Patient placed on respiratory protocol with 1.25 Xopenex ordered (patient uses at home).  Nebulizer given inline with BIPAP at 0922.  Congested, moist productive cough, taken off BIPAP to expectorate thick yellow secretions.  Wheeze persist after nebulizer.  BIPAP turned down from .50 to .40 with O2 saturation at 96%.  RR 23 on BIPAP 12/6, Vt 740 with VE 17 L.

## 2019-10-24 NOTE — PROGRESS NOTES
[facility] Services Progress Note    Hemodialysis treatment ordered today per Dr. Najjar x 3 hours. Treatment initiated at 1310 and ended at 1610.    Pt was hypotensive once during treatment, UF decreased and NS bolus given, BP improved, VSS post HD, c/o SOB throughout HD, denies pain.; see paper flow sheets for details.    Net UF 2,100 mL    Post tx, CVC flushed with saline then locked with heparin 1000 units/mL per designated amount in each wing then clamped and capped. Aspirate heparin prior to next CVC use.    Report given to Primary RN.

## 2019-10-24 NOTE — ED PROVIDER NOTES
ED Provider Note    CHIEF COMPLAINT  Chief Complaint   Patient presents with   • Shortness of Breath       HPI  Parmjit Castelan is a 79 y.o. male who presents to the Emergency Department with a history of end-stage renal disease receives dialysis Monday Wednesday Friday, yesterday on his dialysis today Wednesday he was unable to go as he underwent a cardio ablation.  He was discharged from the hospital yesterday and today went to dialysis where there was only one nurse on staff at the dialysis center, he was short of breath and oxygen saturations were in the ED to to 83% range.  As she could not handle him by herself he was sent here to the hospital for dialysis.  The patient denies any chest pain fevers or any other acute symptoms    REVIEW OF SYSTEMS  Positive for shortness of breath, Negative for chest pain headache fever.  As above all other systems are negative.    PAST MEDICAL HISTORY   has a past medical history of Anesthesia, Anticoagulation monitoring, special range, Aortic stenosis, Arterial leg ulcer ([facility]) (11/8/2018), Atrial flutter ([facility]) (6/12/2019), Basal cell carcinoma of left cheek (3/26/2015), BPH (7/14/2009), Bronchitis (12/25/2018), CAD (coronary artery disease) (2/20/2014), CATARACT, CHF (congestive heart failure), NYHA class II, chronic, systolic ([facility]) E F30 in setting of atrial flutter (6/13/2019), Chronic respiratory failure with hypoxia ([facility]) (5/8/2016), CKD (chronic kidney disease) stage 4, GFR 15-29 ml/min ([facility]) (1/15/2010), COPD (chronic obstructive pulmonary disease) ([facility]), Detached retina, Dialysis, EMPHYSEMA, Glaucoma, Gout, Heart burn, Heart murmur, Hypertension, Indigestion, Kidney cyst, Leg pain, bilateral (8/10/2015), On supplemental oxygen therapy, Peripheral vascular disease ([facility]) (8/10/2015), Pneumonia, Primary insomnia (3/22/2018), Proteinuria, PVD (peripheral vascular disease) ([facility]), and Sleep apnea.    FAMILY HISTORY  Family History   Problem Relation Age of Onset   • Stroke  Mother    • Hypertension Mother    • Lung Disease Father         Emphysema, resp failure   • Genitourinary () Problems Maternal Aunt         hematuria   • Hypertension Brother         SOCIAL HISTORY  Social History     Tobacco Use   • Smoking status: Former Smoker     Packs/day: 1.00     Years: 40.00     Pack years: 40.00     Types: Cigarettes     Last attempt to quit: 1/1/2009     Years since quitting: 10.8   • Smokeless tobacco: Never Used   • Tobacco comment: 1 pk a day for 35 yrs, QUIT JAN 1 2010   Substance and Sexual Activity   • Alcohol use: No     Alcohol/week: 0.0 oz   • Drug use: No   • Sexual activity: Never     Partners: Female     Comment: , two sons, 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       SURGICAL HISTORY   has a past surgical history that includes cataract phaco with iol (4/8/08); av fistula creation (2/12/2010); av fistula revision (2/19/2010); av fistulogram (7/23/2010); angioplasty balloon (7/23/2010); av fistulogram (9/17/2010); angioplasty balloon (9/17/2010); vitrectomy posterior (1/18/2011); scleral buckling (1/18/2011); recovery (8/12/2011); vitrectomy posterior (10/11/2011); recovery (7/23/2012); recovery (1/29/2013); recovery (7/2/2013); av fistula thrombolysis (7/2/2013); recovery (12/17/2013); recovery (3/24/2014); recovery (7/29/2014); recovery (3/24/2015); recovery (12/23/2015); gastroscopy with biopsy (8/13/2016); colonoscopy - endo (8/15/2016); endoscopy procedure (3/21/2018); femoral endarterectomy (Left, 1/25/2019); femoral popliteal bypass (Left, 1/25/2019); angiogram (Left, 1/25/2019); other orthopedic surgery (1998); av fistula creation (Right, 2/21/2019); lisa (6/13/2019); cardioversion (6/13/2019); av fistula creation (Right, 8/6/2019); and cath placement (Right, 8/6/2019).    CURRENT MEDICATIONS  Reviewed.  See Encounter Summary.  Include   Current Facility-Administered Medications:   •  Respiratory Care per Protocol, , Nebulization, Continuous RT, Christina Aviles M.D.  •  " levalbuterol (XOPENEX) 1.25 MG/3ML nebulizer solution 1.25 mg, 1.25 mg, Nebulization, Q4HRS (RT), Christina Aviles M.D., 1.25 mg at 10/24/19 0922  •  levalbuterol (XOPENEX) 1.25 MG/3ML nebulizer solution 1.25 mg, 1.25 mg, Nebulization, Q2HRS PRN (RT), Christina Aviles M.D.  •  ampicillin/sulbactam (UNASYN) 3 g in  mL IVPB, 3 g, Intravenous, Once, Christina Aviles M.D.    Current Outpatient Medications:   •  gabapentin (NEURONTIN) 300 MG Cap, Take 1 Cap by mouth 3 times a day., Disp: , Rfl:   •  ROPINIRole (REQUIP) 0.5 MG Tab, TAKE TWO TABLETS BY MOUTH DAILY, Disp: 60 Tab, Rfl: 3  •  metoprolol (LOPRESSOR) 50 MG Tab, Take 0.5 Tabs by mouth 2 Times a Day., Disp: 180 Tab, Rfl: 3  •  torsemide (DEMADEX) 10 MG tablet, TAKE THREE TABLETS BY MOUTH DAILY, Disp: 30 Tab, Rfl: 2  •  albuterol 108 (90 Base) MCG/ACT Aero Soln inhalation aerosol, Inhale 2 Puffs by mouth every four hours as needed., Disp: 8.5 g, Rfl: 0  •  warfarin (COUMADIN) 5 MG Tab, Take 2.5-5 mg by mouth every day. 2.5 mg (5 mg x 0.5) every Mon, Fri; 5 mg (5 mg x 1) all other days, Disp: , Rfl:   •  tamsulosin (FLOMAX) 0.4 MG capsule, Take 1 Cap by mouth ONE-HALF HOUR AFTER BREAKFAST., Disp: 90 Cap, Rfl: 0  •  omeprazole (PRILOSEC) 40 MG delayed-release capsule, Take 40 mg by mouth every morning., Disp: , Rfl:   •  calcium acetate (PHOS-LO) 667 MG Cap, Take 1,334 mg by mouth 3 times a day, with meals., Disp: , Rfl:   •  levalbuterol (XOPENEX) 1.25 MG/3ML Nebu Soln, 3 mL by Nebulization route every four hours as needed for Shortness of Breath., Disp: 120 Bullet, Rfl: 11  •  pravastatin (PRAVACHOL) 20 MG Tab, Take 20 mg by mouth every evening., Disp: , Rfl:       ALLERGIES  No Known Allergies    PHYSICAL EXAM  VITAL SIGNS: BP (!) 173/77   Pulse 66   Temp 36.2 °C (97.2 °F) (Temporal)   Resp 18   Ht 1.727 m (5' 8\")   Wt 98.4 kg (216 lb 14.9 oz)   SpO2 96%   BMI 32.98 kg/m² Pulse ox of 92% on room air which I interpret as hypoxemia " patient  Constitutional: Alert anxious, able to answer questions  HENT: Nose is normal in appearance, external ears are normal,  moist mucous membranes  Eyes: Anicteric,  pupils are equal round and reactive, there is no conjunctival drainage or pallor   Neck: The trachea is midline, there is no obvious mass or meningeal signs  Cardiovascular: Good perfusion,  regular rate and rhythm without murmurs gallops or rubs  Thorax & Lungs: Respiratory rate and effort are labored, gurgling breath sounds.  Pursed lip breathing, lip cyanosis noted  Abdomen: Abdomen is normal in appearance, no gross peritoneal signs   Musculoskeletal: No deformities noted in all 4 extremities.   Skin: Visualized skin is warm without rash.  Neurologic:  Cranial nerves II through XII are intact there is no focal abnormality noted.  Psychiatric: Normal mood and mentation    RADIOLOGY/PROCEDURES  Imaging Studies:    DX-CHEST-PORTABLE (1 VIEW)   Final Result      New dense right mid and lower lung zone consolidation is worrisome for pneumonia or aspiration      New moderate right pleural effusion      Likely underlying pulmonary edema and cardiac silhouette enlargement which is unchanged            Pertinent Labs   Results for orders placed or performed during the hospital encounter of 10/24/19   CBC w/ Differential   Result Value Ref Range    WBC 8.2 4.8 - 10.8 K/uL    RBC 3.77 (L) 4.70 - 6.10 M/uL    Hemoglobin 11.1 (L) 14.0 - 18.0 g/dL    Hematocrit 37.4 (L) 42.0 - 52.0 %    MCV 99.2 (H) 81.4 - 97.8 fL    MCH 29.4 27.0 - 33.0 pg    MCHC 29.7 (L) 33.7 - 35.3 g/dL    RDW 58.4 (H) 35.9 - 50.0 fL    Platelet Count 213 164 - 446 K/uL    MPV 10.0 9.0 - 12.9 fL    Neutrophils-Polys 89.80 (H) 44.00 - 72.00 %    Lymphocytes 4.60 (L) 22.00 - 41.00 %    Monocytes 4.40 0.00 - 13.40 %    Eosinophils 0.40 0.00 - 6.90 %    Basophils 0.40 0.00 - 1.80 %    Immature Granulocytes 0.40 0.00 - 0.90 %    Nucleated RBC 0.00 /100 WBC    Neutrophils (Absolute) 7.38 1.82 -  7.42 K/uL    Lymphs (Absolute) 0.38 (L) 1.00 - 4.80 K/uL    Monos (Absolute) 0.36 0.00 - 0.85 K/uL    Eos (Absolute) 0.03 0.00 - 0.51 K/uL    Baso (Absolute) 0.03 0.00 - 0.12 K/uL    Immature Granulocytes (abs) 0.03 0.00 - 0.11 K/uL    NRBC (Absolute) 0.00 K/uL    Anisocytosis 1+     Macrocytosis 1+    Complete Metabolic Panel (CMP)   Result Value Ref Range    Sodium 137 135 - 145 mmol/L    Potassium 4.6 3.6 - 5.5 mmol/L    Chloride 92 (L) 96 - 112 mmol/L    Co2 23 20 - 33 mmol/L    Anion Gap 22.0 (H) 0.0 - 11.9    Glucose 109 (H) 65 - 99 mg/dL    Bun 55 (H) 8 - 22 mg/dL    Creatinine 8.13 (HH) 0.50 - 1.40 mg/dL    Calcium 10.4 (H) 8.4 - 10.2 mg/dL    AST(SGOT) 18 12 - 45 U/L    ALT(SGPT) 10 2 - 50 U/L    Alkaline Phosphatase 159 (H) 30 - 99 U/L    Total Bilirubin 0.8 0.1 - 1.5 mg/dL    Albumin 3.8 3.2 - 4.9 g/dL    Total Protein 7.3 6.0 - 8.2 g/dL    Globulin 3.5 1.9 - 3.5 g/dL    A-G Ratio 1.1 g/dL   proBrain Natriuretic Peptide, NT   Result Value Ref Range    NT-proBNP 56279 (H) 0 - 125 pg/mL   Troponin STAT   Result Value Ref Range    Troponin T 1033 (H) 6 - 19 ng/L   ESTIMATED GFR   Result Value Ref Range    GFR If  8 (A) >60 mL/min/1.73 m 2    GFR If Non African American 6 (A) >60 mL/min/1.73 m 2   PLATELET ESTIMATE   Result Value Ref Range    Plt Estimation Normal    MORPHOLOGY   Result Value Ref Range    RBC Morphology Present    DIFFERENTIAL COMMENT   Result Value Ref Range    Comments-Diff see below          COURSE & MEDICAL DECISION MAKING  Nursing notes and vital signs were reviewed. (See chart for details)  The patients results records were reviewed, history was obtained from the patient;     The patient presents with hypoxemia, shortness of breath, and the differential diagnosis includes but is not limited to pulmonary edema, pneumonia, COPD exacerbation.       Initial orders in the Emergency Department included CBC, CMP, PCXR, Troponin BNP and initial treatment in the Emergency  Department included oxygen and Bipap Therapy for presumed pulmonary edema from missing dialysis and the patient received IV  heplock     Dr Larson consulted at 8:30 as patient will need to be admitted and need dialysis, waiting call back.    ED testing reveals improvement after  BIPAP and RT noted productive yellow sputum, CXR shows ? Of aspiration pneumonia, IV Unasyn ordered    Troponin elevated, likely due to abation yesterday, will need to be rechecked.    ICU Dr. Simpson called at 9:40 for admission, he is currently doing bronch and will call back for admission.    10 am;  Discussed with Dr. Jimenez who will organize dialysis today    CRITICAL CARE  The very real possibilty of a deterioration of this patient's condition required the highest level of my preparedness for sudden, emergent intervention.  I provided critical care services, which included medication orders, frequent reevaluations of the patient's condition and response to treatment, ordering and reviewing test results, and discussing the case with various consultants.  The critical care time associated with the care of the patient was 40 minutes. Review chart for interventions. This time is exclusive of any other billable procedures.       FINAL IMPRESSION  1. Hypoxemia  2. ? Aspiration pneumonia  3.  Recent cardiac ablation  4.  ESRD with pulmonary edema and  Missed dialysis       DISPOSITION  Admit ICU    Electronically signed by: Christina Aviles, 10/24/2019 8:54 AM

## 2019-10-24 NOTE — FLOWSHEET NOTE
Patient take off BIPAP to cough.  Specimen thin and bloody with some thick yellow phlegm.  Earlier kleenex specimen was just thick dark yellow.  While off BIPAP, audible gurgle.  Patient does not feel BIPAP has helped a lot though he is agreeable to keep it on.  Patient received 2nd Xopene nebulizer in ER now.  Coarse crackles and wheeze persist.     10/24/19 1047   General Vent Information   Pulse Oximetry 98 %   BiPAP for Respiratory Failure   #BiPap Initial Day  Yes   IPAP (cmH2O) 12   EPAP (cm H2O) 6   FiO2 40   Alarms Set / Checked Yes   Respiratory Rate Patient 25   Spontaneous Vt (ml) 650   Spontaneous Ve (LPM) 16.5   Chest Exam   Work Of Breathing / Effort Moderate;Increased Work of Breathing   Breath Sounds   RUL Breath Sounds Inspiratory Wheezes;Expiratory Wheezes;Coarse Crackles   RML Breath Sounds Inspiratory Wheezes;Expiratory Wheezes;Coarse Crackles   RLL Breath Sounds Inspiratory Wheezes;Expiratory Wheezes;Diminished   SILVANA Breath Sounds Inspiratory Wheezes;Expiratory Wheezes;Coarse Crackles   LLL Breath Sounds Inspiratory Wheezes;Expiratory Wheezes;Diminished

## 2019-10-24 NOTE — FLOWSHEET NOTE
10/24/19 1406   Events/Summary/Plan   Events/Summary/Plan Tx given   Non-Invasive Resp Device Site Inspection Completed Intact   Interdisciplinary Plan of Care-Goals (Indications)   Bronchodilator Indications Physical Exam / Hyperinflation / Wheezing (bronchospasm)   Interdisciplinary Plan of Care-Outcomes    Bronchodilator Outcome Diminished Wheezing and Volume of Air Movement Increased   Education   Education Yes - Pt. / Family has been Instructed in use of Respiratory Medications and Adverse Reactions;Yes - Pt. / Family has been Instructed in use of Respiratory Equipment   RT Assessment of Delivered Medications   Evaluation of Medication Delivery Daily Yes-- Pt /Family has been Instructed in use of Respiratory Medications and Adverse Reactions   SVN Group   #SVN Performed Yes   Given By: Mouthpiece   Date SVN Last Changed 10/24/19   Date SVN Next Change Due (Q 7 Days) 10/31/19   Respiratory WDL   Respiratory (WDL) X   Chest Exam   Respiration (!) 28   Pulse 66   Breath Sounds   Pre/Post Intervention Post Intervention Assessment   RUL Breath Sounds Crackles;Expiratory Wheezes   RML Breath Sounds Crackles;Expiratory Wheezes;Fine Crackles   RLL Breath Sounds Fine Crackles;Diminished   SILVANA Breath Sounds Crackles;Expiratory Wheezes   LLL Breath Sounds Crackles;Diminished   Secretions   Cough Non Productive;Congested   Oximetry   #Pulse Oximetry (Single Determination) Yes   Oxygen   Pulse Oximetry 96 %   O2 (LPM) 6   O2 Daily Delivery Respiratory  Silicone Nasal Cannula

## 2019-10-24 NOTE — ED TRIAGE NOTES
Pt presents with shortness of breath that started around 2am. Patient dialysis patient m,w,f, last dialysis Wednesday. Patient had cardiac ablation Wednesday. Patient placed on oxygen in triage.

## 2019-10-24 NOTE — ED NOTES
Pt transported to  via gurney with monitor,  RN and RT at bedside. ICU staff at bedside upon arrival.

## 2019-10-24 NOTE — DISCHARGE PLANNING
Outpatient Dialysis Note    Confirmed patient is active at:    [facility]  [address]   [place] [address]      Schedule: Monday, Wednesday, Friday  Time: 2:30 pm    Spoke with Jazmin at facility who confirmed.    Forwarded records for review.    Dialysis Coordinator, [facility]  Dawna Santa [phone]

## 2019-10-24 NOTE — ED NOTES
Med Rec completed per patient   Allergies reviewed  No ORAL antibiotics in last 14 days      Coumadin as follows:  INR goal:   2.0-3.0   TTR:   56.1 % (2.5 mo)   INR used for dosing:   3.09! (10/16/2019)   Warfarin m  INR goal:   2.0-3.0   TTR:   56.1 % (2.5 mo)   INR used for dosing:   3.09! (10/16/2019)   Warfarin maintenance plan:   2.5 mg (5 mg x 0.5) every Mon, Fri; 5 mg (5 mg x 1) all other days   Weekly warfarin total:   30 mg     aintenance plan:   2.5 mg (5 mg x 0.5) every Mon, Fri; 5 mg (5 mg x 1) all other days   Weekly warfarin total:   30 mg

## 2019-10-24 NOTE — PROGRESS NOTES
Patient arrived to unit from ED accompanied by RN and RT.  Pt ambulated from stretcher to bed.  Pt is A&O x4, VSS.  No c/o pain at this time. Dialysis nurse bedside. No voiced needs at this time. Safety precautions in place.

## 2019-10-24 NOTE — FLOWSHEET NOTE
10/24/19 1420   Events/Summary/Plan   Events/Summary/Plan Decreased O2 to 4L    Chest Exam   Respiration (!) 22   Pulse 65   Oximetry   #Pulse Oximetry (Single Determination) Yes   Oxygen   Pulse Oximetry 99 %   O2 (LPM) 4   O2 Daily Delivery Respiratory  Silicone Nasal Cannula

## 2019-10-24 NOTE — ED NOTES
0900 Iv placed , labs bld cx x 1  drawn and taken to lab. poc update given to pt, results pending at this time  Pt placed on bipap

## 2019-10-24 NOTE — RESPIRATORY CARE
Patient transported from ER to ICU on 6 L nasal cannula with oximetry at 97%.  Patient wants to stay off BIPAP for a bit, says he feels a little claustrophobic.  Strong congested cough.  Still slightly SOB at rest with RR 24.  Dialysis here in ICU.

## 2019-10-24 NOTE — PROGRESS NOTES
Patient currently in a first degree heart block with right bundle branch block. Per notes from post ablation EKG, patient was in same rhythm after the ablation yesterday.  Dr Phillip joseph.

## 2019-10-24 NOTE — FLOWSHEET NOTE
Patient just arrived and placed on BIPAP.  Patient has a history of COPD, CHF, and kidney disease.  Patient tolerating settings of 12/6, FIO2 .50 with RR 24.       10/24/19 0900   Events/Summary/Plan   Events/Summary/Plan Patient placed on BIPAP in ER.  Breath sounds very wet sounding with wheeze and crackles   General Vent Information   Pulse Oximetry 97 %   BiPAP for Respiratory Failure   #BiPap Initial Day  Yes   IPAP (cmH2O) 12   EPAP (cm H2O) 6   FiO2 50   Alarms Set / Checked Yes   Non-Invasive Temp (C)   (warming)   Respiratory Rate Patient 27   Spontaneous Vt (ml) 550   Spontaneous Ve (LPM) 14.6   Chest Exam   Work Of Breathing / Effort Moderate;Increased Work of Breathing   Breath Sounds   RUL Breath Sounds Crackles;Expiratory Wheezes   RML Breath Sounds Crackles;Expiratory Wheezes   RLL Breath Sounds Crackles;Diminished   SILVANA Breath Sounds Crackles;Expiratory Wheezes   LLL Breath Sounds Crackles;Diminished   Secretions   Cough Congested;Moist;Non Productive   How Sputum Obtained Cough on Request

## 2019-10-25 ENCOUNTER — APPOINTMENT (OUTPATIENT)
Dept: RADIOLOGY | Facility: MEDICAL CENTER | Age: 79
DRG: 189 | End: 2019-10-25
Attending: INTERNAL MEDICINE
Payer: MEDICARE

## 2019-10-25 LAB
ALBUMIN SERPL BCP-MCNC: 3.1 G/DL (ref 3.2–4.9)
ALBUMIN/GLOB SERPL: 1 G/DL
ALP SERPL-CCNC: 119 U/L (ref 30–99)
ALT SERPL-CCNC: 9 U/L (ref 2–50)
ANION GAP SERPL CALC-SCNC: 17 MMOL/L (ref 0–11.9)
AST SERPL-CCNC: 16 U/L (ref 12–45)
BASOPHILS # BLD AUTO: 0.2 % (ref 0–1.8)
BASOPHILS # BLD: 0.02 K/UL (ref 0–0.12)
BILIRUB SERPL-MCNC: 0.6 MG/DL (ref 0.1–1.5)
BUN SERPL-MCNC: 34 MG/DL (ref 8–22)
CALCIUM SERPL-MCNC: 9.4 MG/DL (ref 8.4–10.2)
CHLORIDE SERPL-SCNC: 96 MMOL/L (ref 96–112)
CO2 SERPL-SCNC: 23 MMOL/L (ref 20–33)
CREAT SERPL-MCNC: 5.77 MG/DL (ref 0.5–1.4)
EOSINOPHIL # BLD AUTO: 0.09 K/UL (ref 0–0.51)
EOSINOPHIL NFR BLD: 1 % (ref 0–6.9)
ERYTHROCYTE [DISTWIDTH] IN BLOOD BY AUTOMATED COUNT: 60 FL (ref 35.9–50)
GLOBULIN SER CALC-MCNC: 3 G/DL (ref 1.9–3.5)
GLUCOSE SERPL-MCNC: 82 MG/DL (ref 65–99)
GRAM STN SPEC: NORMAL
HCT VFR BLD AUTO: 30.4 % (ref 42–52)
HGB BLD-MCNC: 9.5 G/DL (ref 14–18)
IMM GRANULOCYTES # BLD AUTO: 0.03 K/UL (ref 0–0.11)
IMM GRANULOCYTES NFR BLD AUTO: 0.3 % (ref 0–0.9)
INR PPP: 4.34 (ref 0.87–1.13)
LACTATE BLD-SCNC: 1.4 MMOL/L (ref 0.5–2)
LACTATE BLD-SCNC: 2.5 MMOL/L (ref 0.5–2)
LYMPHOCYTES # BLD AUTO: 0.34 K/UL (ref 1–4.8)
LYMPHOCYTES NFR BLD: 3.7 % (ref 22–41)
MCH RBC QN AUTO: 31 PG (ref 27–33)
MCHC RBC AUTO-ENTMCNC: 31.3 G/DL (ref 33.7–35.3)
MCV RBC AUTO: 99.3 FL (ref 81.4–97.8)
MONOCYTES # BLD AUTO: 0.4 K/UL (ref 0–0.85)
MONOCYTES NFR BLD AUTO: 4.4 % (ref 0–13.4)
NEUTROPHILS # BLD AUTO: 8.21 K/UL (ref 1.82–7.42)
NEUTROPHILS NFR BLD: 90.4 % (ref 44–72)
NRBC # BLD AUTO: 0 K/UL
NRBC BLD-RTO: 0 /100 WBC
NT-PROBNP SERPL IA-MCNC: ABNORMAL PG/ML (ref 0–125)
PLATELET # BLD AUTO: 160 K/UL (ref 164–446)
PMV BLD AUTO: 10.6 FL (ref 9–12.9)
POTASSIUM SERPL-SCNC: 4.7 MMOL/L (ref 3.6–5.5)
PROT SERPL-MCNC: 6.1 G/DL (ref 6–8.2)
PROTHROMBIN TIME: 43 SEC (ref 12–14.6)
RBC # BLD AUTO: 3.06 M/UL (ref 4.7–6.1)
SIGNIFICANT IND 70042: NORMAL
SITE SITE: NORMAL
SODIUM SERPL-SCNC: 136 MMOL/L (ref 135–145)
SOURCE SOURCE: NORMAL
TROPONIN T SERPL-MCNC: 1083 NG/L (ref 6–19)
WBC # BLD AUTO: 9.1 K/UL (ref 4.8–10.8)

## 2019-10-25 PROCEDURE — 83880 ASSAY OF NATRIURETIC PEPTIDE: CPT

## 2019-10-25 PROCEDURE — 700111 HCHG RX REV CODE 636 W/ 250 OVERRIDE (IP): Performed by: INTERNAL MEDICINE

## 2019-10-25 PROCEDURE — 5A1D70Z PERFORMANCE OF URINARY FILTRATION, INTERMITTENT, LESS THAN 6 HOURS PER DAY: ICD-10-PCS | Performed by: INTERNAL MEDICINE

## 2019-10-25 PROCEDURE — 94760 N-INVAS EAR/PLS OXIMETRY 1: CPT

## 2019-10-25 PROCEDURE — 770020 HCHG ROOM/CARE - TELE (206)

## 2019-10-25 PROCEDURE — 99233 SBSQ HOSP IP/OBS HIGH 50: CPT | Performed by: INTERNAL MEDICINE

## 2019-10-25 PROCEDURE — 700102 HCHG RX REV CODE 250 W/ 637 OVERRIDE(OP): Performed by: INTERNAL MEDICINE

## 2019-10-25 PROCEDURE — 80053 COMPREHEN METABOLIC PANEL: CPT

## 2019-10-25 PROCEDURE — P9047 ALBUMIN (HUMAN), 25%, 50ML: HCPCS | Performed by: INTERNAL MEDICINE

## 2019-10-25 PROCEDURE — 700101 HCHG RX REV CODE 250: Performed by: INTERNAL MEDICINE

## 2019-10-25 PROCEDURE — 83605 ASSAY OF LACTIC ACID: CPT | Mod: 91

## 2019-10-25 PROCEDURE — 700101 HCHG RX REV CODE 250: Performed by: EMERGENCY MEDICINE

## 2019-10-25 PROCEDURE — 84484 ASSAY OF TROPONIN QUANT: CPT

## 2019-10-25 PROCEDURE — 99232 SBSQ HOSP IP/OBS MODERATE 35: CPT | Performed by: INTERNAL MEDICINE

## 2019-10-25 PROCEDURE — 85610 PROTHROMBIN TIME: CPT

## 2019-10-25 PROCEDURE — 90935 HEMODIALYSIS ONE EVALUATION: CPT

## 2019-10-25 PROCEDURE — 700105 HCHG RX REV CODE 258: Performed by: HOSPITALIST

## 2019-10-25 PROCEDURE — 85025 COMPLETE CBC W/AUTO DIFF WBC: CPT

## 2019-10-25 PROCEDURE — 94640 AIRWAY INHALATION TREATMENT: CPT

## 2019-10-25 PROCEDURE — 700105 HCHG RX REV CODE 258: Performed by: INTERNAL MEDICINE

## 2019-10-25 PROCEDURE — 71045 X-RAY EXAM CHEST 1 VIEW: CPT

## 2019-10-25 PROCEDURE — A9270 NON-COVERED ITEM OR SERVICE: HCPCS | Performed by: INTERNAL MEDICINE

## 2019-10-25 RX ORDER — ALBUMIN (HUMAN) 12.5 G/50ML
12.5 SOLUTION INTRAVENOUS
Status: DISCONTINUED | OUTPATIENT
Start: 2019-10-25 | End: 2019-10-27 | Stop reason: HOSPADM

## 2019-10-25 RX ORDER — LEVALBUTEROL INHALATION SOLUTION 1.25 MG/3ML
1.25 SOLUTION RESPIRATORY (INHALATION)
Status: DISCONTINUED | OUTPATIENT
Start: 2019-10-25 | End: 2019-10-27 | Stop reason: HOSPADM

## 2019-10-25 RX ORDER — SODIUM CHLORIDE 9 MG/ML
250 INJECTION, SOLUTION INTRAVENOUS ONCE
Status: COMPLETED | OUTPATIENT
Start: 2019-10-25 | End: 2019-10-25

## 2019-10-25 RX ADMIN — OMEPRAZOLE 40 MG: 20 CAPSULE, DELAYED RELEASE ORAL at 05:08

## 2019-10-25 RX ADMIN — CALCIUM ACETATE 1334 MG: 667 CAPSULE ORAL at 17:31

## 2019-10-25 RX ADMIN — TAMSULOSIN HYDROCHLORIDE 0.4 MG: 0.4 CAPSULE ORAL at 08:44

## 2019-10-25 RX ADMIN — LEVALBUTEROL HYDROCHLORIDE 1.25 MG: 1.25 SOLUTION RESPIRATORY (INHALATION) at 19:48

## 2019-10-25 RX ADMIN — HEPARIN SODIUM 2000 UNITS: 1000 INJECTION, SOLUTION INTRAVENOUS; SUBCUTANEOUS at 05:30

## 2019-10-25 RX ADMIN — CEFTRIAXONE SODIUM 2 G: 2 INJECTION, POWDER, FOR SOLUTION INTRAMUSCULAR; INTRAVENOUS at 05:08

## 2019-10-25 RX ADMIN — ROPINIROLE HYDROCHLORIDE 1 MG: 1 TABLET, FILM COATED ORAL at 17:31

## 2019-10-25 RX ADMIN — PRAVASTATIN SODIUM 20 MG: 20 TABLET ORAL at 20:05

## 2019-10-25 RX ADMIN — DOXYCYCLINE 100 MG: 100 TABLET, FILM COATED ORAL at 05:08

## 2019-10-25 RX ADMIN — ROPINIROLE HYDROCHLORIDE 1 MG: 1 TABLET, FILM COATED ORAL at 05:09

## 2019-10-25 RX ADMIN — LEVALBUTEROL HYDROCHLORIDE 1.25 MG: 1.25 SOLUTION RESPIRATORY (INHALATION) at 11:43

## 2019-10-25 RX ADMIN — METRONIDAZOLE 500 MG: 500 INJECTION, SOLUTION INTRAVENOUS at 08:44

## 2019-10-25 RX ADMIN — GABAPENTIN 300 MG: 300 CAPSULE ORAL at 05:09

## 2019-10-25 RX ADMIN — ALBUMIN (HUMAN) 12.5 G: 5 SOLUTION INTRAVENOUS at 07:18

## 2019-10-25 RX ADMIN — DOXYCYCLINE 100 MG: 100 TABLET, FILM COATED ORAL at 17:31

## 2019-10-25 RX ADMIN — GABAPENTIN 300 MG: 300 CAPSULE ORAL at 11:29

## 2019-10-25 RX ADMIN — LEVALBUTEROL HYDROCHLORIDE 1.25 MG: 1.25 SOLUTION RESPIRATORY (INHALATION) at 15:33

## 2019-10-25 RX ADMIN — HEPARIN SODIUM 3800 UNITS: 1000 INJECTION, SOLUTION INTRAVENOUS; SUBCUTANEOUS at 08:35

## 2019-10-25 RX ADMIN — LEVALBUTEROL HYDROCHLORIDE 1.25 MG: 1.25 SOLUTION RESPIRATORY (INHALATION) at 04:24

## 2019-10-25 RX ADMIN — CALCIUM ACETATE 1334 MG: 667 CAPSULE ORAL at 11:29

## 2019-10-25 RX ADMIN — GABAPENTIN 300 MG: 300 CAPSULE ORAL at 17:31

## 2019-10-25 RX ADMIN — METOPROLOL TARTRATE 25 MG: 25 TABLET ORAL at 17:31

## 2019-10-25 RX ADMIN — SODIUM CHLORIDE 250 ML: 9 INJECTION, SOLUTION INTRAVENOUS at 00:45

## 2019-10-25 ASSESSMENT — ENCOUNTER SYMPTOMS
SHORTNESS OF BREATH: 1
SENSORY CHANGE: 0
DIZZINESS: 0
DIARRHEA: 0
COUGH: 1
PSYCHIATRIC NEGATIVE: 1
LOSS OF CONSCIOUSNESS: 0
MYALGIAS: 0
STRIDOR: 0
FOCAL WEAKNESS: 0
COUGH: 0
EYE DISCHARGE: 0
SHORTNESS OF BREATH: 0
WEIGHT LOSS: 0
SINUS PAIN: 0
ABDOMINAL PAIN: 0
FEVER: 0
EYE PAIN: 0
VOMITING: 0
ORTHOPNEA: 0
SPUTUM PRODUCTION: 0
NAUSEA: 0
SORE THROAT: 0
HEADACHES: 0
WHEEZING: 0
CHILLS: 0

## 2019-10-25 ASSESSMENT — COGNITIVE AND FUNCTIONAL STATUS - GENERAL
STANDING UP FROM CHAIR USING ARMS: A LITTLE
PERSONAL GROOMING: A LITTLE
CLIMB 3 TO 5 STEPS WITH RAILING: A LITTLE
MOVING FROM LYING ON BACK TO SITTING ON SIDE OF FLAT BED: A LITTLE
EATING MEALS: A LITTLE
MOVING TO AND FROM BED TO CHAIR: A LITTLE
HELP NEEDED FOR BATHING: A LITTLE
DRESSING REGULAR UPPER BODY CLOTHING: A LITTLE
DRESSING REGULAR LOWER BODY CLOTHING: A LITTLE
SUGGESTED CMS G CODE MODIFIER DAILY ACTIVITY: CK
DAILY ACTIVITIY SCORE: 18
MOBILITY SCORE: 18
WALKING IN HOSPITAL ROOM: A LITTLE
TOILETING: A LITTLE
SUGGESTED CMS G CODE MODIFIER MOBILITY: CK
TURNING FROM BACK TO SIDE WHILE IN FLAT BAD: A LITTLE

## 2019-10-25 NOTE — ASSESSMENT & PLAN NOTE
Status post ablation on 10/23/2019  Currently in sinus rhythm  Discussed with EP cardiology  Cont coumadin per pharmacy, goal INR 2-3

## 2019-10-25 NOTE — PROGRESS NOTES
Inpatient Anticoagulation Service Note    Date: 10/25/2019    Reason for Anticoagulation: Atrial Fibrillation   Target INR: 2.0 to 3.0  [id number] VASc Score: 6      Hemoglobin Value: (!) 9.5  Hematocrit Value: (!) 30.4    INR from last 7 days     Date/Time INR Value    10/25/19 0408  (!) 4.34    10/24/19 1330  (!) 3.06        Dose from last 7 days     Date/Time Dose (mg)    10/25/19 1500  0    10/24/19 0858  0        Significant Interactions: Antibiotics, Flagyl, Proton Pump Inhibitor (If less than 5 days and overlap therapy discontinued -- document reason (i.e. Bleed Risk))    (If still on overlap therapy, if No -- document reason (i.e. Bleed Risk))    Comments: (Home regimen: Warfarin 2.5 mg M/F; 5 mg all other days)    Plan:  Continue to hold warfarin due to supratherapeutic INR     Pharmacist suggested discharge dosing: resume home regimen with close follow up     Jerzy Vasquez, PharmD

## 2019-10-25 NOTE — PROGRESS NOTES
Gretchen from Lab called with critical result of Lactic Acid of 4.2 at 2352. Critical lab result read back to Gretchen.   Dr. Doran notified of critical lab result at 0022.  Critical lab result read back by Dr. Doran. Per Dr. Doran give small dose fluid bolus of 250 ml NS over 1 hr due to increasing lactic. Dr Doran placing orders.

## 2019-10-25 NOTE — PROGRESS NOTES
Critical Care Progress Note    Date of admission  10/24/2019    Chief Complaint  79 y.o. male admitted 10/24/2019 with Respiratory distress, missed dialysis session in the setting of a recent cardiac ablation    Hospital Course    Parmjit Castelan is a very pleasant 79-year-old male with a 40-pack-year smoking history quit 4 years ago who presented to the [facility] emergency room on 10/24/2019 for complaints of shortness of breath on the setting of a missed dialysis session. Patient was awoken from sleep early in the morning on the day of admission dyspnea and a nonproductive cough.  He denied fevers.  He had undergone a cardiac ablation for atrial flutter 1 day prior to admission and was discharged from the hospital the day prior.  He had missed his dialysis session while he was in the hospital and on arrival to the ED here at [facility] his oxygen saturations were in the low 80s.  He was unable to maintain oxygen saturations on supplemental oxygen was started on BiPAP and transferred to the ICU.      Interval Problem Update  Reviewed last 24 hour events:  Elevated lactic acid last night 4.2, given 250 cc bolus, downtrending to 2.5  Afebrile  Supplemental oxygen requirements downtrending from 2-4 L nasal cannula, downtrending, wears 2L at home  HD this morning -2L  Supratherapeutic INR, 4.34, no evidence of clinical signs of bleeding    Review of Systems  Review of Systems   Constitutional: Negative for chills, fever and weight loss.   HENT: Negative for congestion, sinus pain and sore throat.    Eyes: Negative for pain and discharge.   Respiratory: Positive for cough and shortness of breath. Negative for sputum production, wheezing and stridor.    Cardiovascular: Negative for chest pain, orthopnea and leg swelling.   Gastrointestinal: Negative for abdominal pain, diarrhea, nausea and vomiting.   Genitourinary: Negative for dysuria, frequency and urgency.   Musculoskeletal: Negative for myalgias.    Skin: Negative for rash.   Neurological: Negative for dizziness, sensory change, focal weakness, loss of consciousness and headaches.   Psychiatric/Behavioral: Negative.    All other systems reviewed and are negative.     Vital Signs for last 24 hours   Temp:  [36.2 °C (97.2 °F)-36.7 °C (98.1 °F)] 36.7 °C (98.1 °F)  Pulse:  [59-74] 65  Resp:  [17-47] 28  BP: ()/(38-77) 88/40  SpO2:  [96 %-99 %] 98 %    Physical Exam   Physical Exam   Constitutional: He is oriented to person, place, and time. He appears well-developed and well-nourished. No distress.   Comfortable, sitting in chair at side of bed  Breathing comfortably on RA, not tachypnic, although sats in high 80s in no distress   HENT:   Mouth/Throat: Oropharynx is clear and moist. No oropharyngeal exudate.   Eyes: Conjunctivae are normal. Right eye exhibits no discharge. Left eye exhibits no discharge. No scleral icterus.   Neck: Normal range of motion. No JVD present. No tracheal deviation present. No thyromegaly present.   Cardiovascular: Regular rhythm and normal heart sounds. Exam reveals no friction rub.   No murmur heard.  Inappropriately bradycardic   Pulmonary/Chest: No stridor. No respiratory distress. He has no wheezes. He has rales.   Decreased breath sounds right lung base    Tunneled hemodialysis line in right chest, insertion site appears clean dry and intact   Abdominal: Soft. Bowel sounds are normal. He exhibits no distension. There is no tenderness. There is no rebound.   Musculoskeletal: Normal range of motion. He exhibits no edema.   Lymphadenopathy:     He has no cervical adenopathy.   Neurological: He is alert and oriented to person, place, and time.   Skin: Skin is warm. No rash noted. No erythema. No pallor.   Toe ulcer noted  Maturing AVF right arm   Nursing note and vitals reviewed.    Medications  Current Facility-Administered Medications   Medication Dose Route Frequency Provider Last Rate Last Dose   • Respiratory Care per  Protocol   Nebulization Continuous RT Christina Aviles M.D.       • levalbuterol (XOPENEX) 1.25 MG/3ML nebulizer solution 1.25 mg  1.25 mg Nebulization Q4HRS (RT) Christina Aviles M.D.   1.25 mg at 10/25/19 0424   • levalbuterol (XOPENEX) 1.25 MG/3ML nebulizer solution 1.25 mg  1.25 mg Nebulization Q2HRS PRN (RT) Christina Aviles M.D.   1.25 mg at 10/24/19 1047   • Respiratory Care per Protocol   Nebulization Continuous RT Eagle Fisher M.D.       • cefTRIAXone (ROCEPHIN) 2 g in  mL IVPB  2 g Intravenous Q24HRS Eagle Fisher M.D.   Stopped at 10/25/19 0538   • doxycycline monohydrate (ADOXA) tablet 100 mg  100 mg Oral Q12HRS Eagle Fisher M.D.   100 mg at 10/25/19 0508   • metroNIDAZOLE (FLAGYL) IVPB 500 mg  500 mg Intravenous Q8HRS Eagle Fisher M.D.   Stopped at 10/24/19 2214   • senna-docusate (PERICOLACE or SENOKOT S) 8.6-50 MG per tablet 2 Tab  2 Tab Oral BID Eagle Fisher M.D.        And   • polyethylene glycol/lytes (MIRALAX) PACKET 1 Packet  1 Packet Oral QDAY PRN Eagle Fisher M.D.        And   • magnesium hydroxide (MILK OF MAGNESIA) suspension 30 mL  30 mL Oral QDAY PRN Eagle Fisher M.D.        And   • bisacodyl (DULCOLAX) suppository 10 mg  10 mg Rectal QDAY PRN Eagle Fisher M.D.       • Respiratory Care per Protocol   Nebulization Continuous RT Eagle Fisher M.D.       • metoprolol (LOPRESSOR) tablet 25 mg  25 mg Oral BID Eagle Fisher M.D.   Stopped at 10/24/19 1800   • gabapentin (NEURONTIN) capsule 300 mg  300 mg Oral TID Eagle Fisher M.D.   300 mg at 10/25/19 0509   • omeprazole (PRILOSEC) capsule 40 mg  40 mg Oral QAM Eagle Fisher M.D.   40 mg at 10/25/19 0508   • pravastatin (PRAVACHOL) tablet 20 mg  20 mg Oral Nightly Eagle Fisher M.D.   20 mg at 10/24/19 2114   • ROPINIRole (REQUIP) tablet 1 mg  1 mg Oral BID Eagle Fisher M.D.   1 mg at 10/25/19 0509   • tamsulosin (FLOMAX) capsule 0.4 mg  0.4 mg Oral AFTER BREAKFAST  Eagle Fisher M.D.       • MD Alert...Warfarin per Pharmacy   Other PHARMACY TO DOSE Eagle Fisher M.D.       • heparin injection 3,800 Units  3,800 Units Intracatheter DIALYSIS PRN Fadi Najjar, M.D.   3,800 Units at 10/24/19 1538   • heparin injection 2,000 Units  2,000 Units Intravenous DIALYSIS PRN Fadi Najjar, M.D.   2,000 Units at 10/24/19 1310       Fluids    Intake/Output Summary (Last 24 hours) at 10/25/2019 0652  Last data filed at 10/25/2019 0100  Gross per 24 hour   Intake 1350 ml   Output 2700 ml   Net -1350 ml       Laboratory          Recent Labs     10/23/19  1110 10/24/19  0858 10/25/19  0408   SODIUM 141 137 136   POTASSIUM 4.5 4.6 4.7   CHLORIDE 102 92* 96   CO2 26 23 23   BUN 49* 55* 34*   CREATININE 6.39* 8.13* 5.77*   CALCIUM 9.2 10.4* 9.4     Recent Labs     10/23/19  1110 10/24/19  0858 10/25/19  0408   ALTSGPT 9 10 9   ASTSGOT 15 18 16   ALKPHOSPHAT 137* 159* 119*   TBILIRUBIN 0.7 0.8 0.6   GLUCOSE 67 109* 82     Recent Labs     10/23/19  1110 10/24/19  0858 10/25/19  0408   WBC 2.9* 8.2 9.1   NEUTSPOLYS 67.30 89.80* 90.40*   LYMPHOCYTES 17.40* 4.60* 3.70*   MONOCYTES 11.50 4.40 4.40   EOSINOPHILS 2.80 0.40 1.00   BASOPHILS 0.70 0.40 0.20   ASTSGOT 15 18 16   ALTSGPT 9 10 9   ALKPHOSPHAT 137* 159* 119*   TBILIRUBIN 0.7 0.8 0.6     Recent Labs     10/23/19  1110 10/24/19  0858 10/24/19  1330 10/25/19  0408   RBC 3.56* 3.77*  --  3.06*   HEMOGLOBIN 11.0* 11.1*  --  9.5*   HEMATOCRIT 35.9* 37.4*  --  30.4*   PLATELETCT 125* 213  --  160*   PROTHROMBTM 28.2*  --  32.5* 43.0*   INR 2.53*  --  3.06* 4.34*     Imaging  X-Ray:  I have personally reviewed the images and compared with prior images.  Chest x-ray personally reviewed, showing a right lower lobe and middle lobe infiltrate as well as a large right pleural effusion, improving    Blood cultures 10/24 NGTD  Sputum showing moderate WBCs, few gram-positive rods, few gram-negative rods  Influenza swab negative   Procalcitonin 2.43  Lactic  acid 2.5 -> 1.4  proBNP level still > 35,000  Troponin 1083     Bedside ultrasound confirms persistent large right pleural effusion     TTE showing LVEF 60%, grade 2 diastolic dysfunction, severely dilated left atrium, and severe aortic stenosis, estimated RVSP 50 mmHg    Assessment/Plan  Acute hypoxemic respiratory failure (HCC)  Assessment & Plan  Due to missed dialysis session resulting in hypervolemia/pulmonary edema, compounded by pleural effusion and possible aspiration pneumonia  Continue ceftriaxone, doxycycline  DC flagyl- elevated INR and low suspicion for aspiration now  Follow-up respiratory viral panel  Hemodialysis again today  DC BIPAP  Transfer to telemetry  Cont supplemental O2 maintain SPO2 greater than 92%    Severe aortic stenosis- (present on admission)  Assessment & Plan  Noted on TTE on 10/24  HD stable currently  Continue to monitor, consider cardiology consult if persistent resp failure despite HD    HELGA (obstructive sleep apnea)- (present on admission)  Assessment & Plan  Continue BiPAP nightly    Pleural effusion, right  Assessment & Plan  Associated with right lower lobe infiltrate, appears free-flowing and simple on ultrasound  Plan for HD today, continue antibiotics, reassess tomorrow  Diagnostic/therapeutic thoracentesis tomorrow when INR normalizes    History of atrial flutter- (present on admission)  Assessment & Plan  Status post ablation on 10/23/2019  Currently in sinus rhythm  Discussed with EP cardiology  Cont coumadin INR 2-3- holding today given supratherapeutic INR due to Flagyl     VTE:  Coumadin  Ulcer: Not Indicated  Lines: Central Line  Ongoing indication addressed- tunneled HD line    I have performed a physical exam and reviewed and updated ROS and Plan today (10/25/2019). In review of yesterday's note (10/24/2019), there are no changes except as documented above.     Discussed patient condition and risk of morbidity and/or mortality with RN, RT, Charge nurse / hot  rounds and Patient     This note was generated using voice recognition software which has a chance of producing errors of grammar and content.  I have made every reasonable attempt to find and correct any errors, but it should be expected that some may not be found prior to finalization of this note.  __________  Eagle Fisher MD  Pulmonary and Critical Care Medicine  [facility]

## 2019-10-25 NOTE — CARE PLAN
Problem: Communication  Goal: The ability to communicate needs accurately and effectively will improve  Outcome: PROGRESSING AS EXPECTED     Problem: Safety  Goal: Will remain free from injury  Outcome: PROGRESSING AS EXPECTED     Problem: Knowledge Deficit  Goal: Knowledge of disease process/condition, treatment plan, diagnostic tests, and medications will improve  Outcome: PROGRESSING AS EXPECTED     Discussed POC with pt, pt in agreement. Pt knows to call before attempting to get out of bed and when in need of assistance.

## 2019-10-25 NOTE — FLOWSHEET NOTE
10/25/19 0425   Events/Summary/Plan   Events/Summary/Plan SVN   Interdisciplinary Plan of Care-Goals (Indications)   Bronchodilator Indications Strong Subjective / Objective Improvement   Interdisciplinary Plan of Care-Outcomes    Bronchodilator Outcome Patient at Stable Baseline;Diminished Wheezing and Volume of Air Movement Increased   Education   Education Yes - Pt. / Family has been Instructed in use of Respiratory Equipment;Yes - Pt. / Family has been Instructed in use of Respiratory Medications and Adverse Reactions   RT Assessment of Delivered Medications   Evaluation of Medication Delivery Daily Yes-- Pt /Family has been Instructed in use of Respiratory Medications and Adverse Reactions   SVN Group   #SVN Performed Yes   Given By: Breanne   Respiratory WDL   Respiratory (WDL) X   Chest Exam   Respiration (!) 28   Pulse 65   Breath Sounds   Pre/Post Intervention Post Intervention Assessment   RUL Breath Sounds Crackles   RML Breath Sounds Crackles;Diminished   RLL Breath Sounds Crackles;Diminished   SILVANA Breath Sounds Crackles   LLL Breath Sounds Diminished;Crackles   Oximetry   Continuous Oximetry Yes   Oxygen   Pulse Oximetry 98 %   O2 (LPM) 4   O2 Daily Delivery Respiratory  Silicone Nasal Cannula

## 2019-10-25 NOTE — ASSESSMENT & PLAN NOTE
Due to missed dialysis session resulting in hypervolemia  HD today  Continue metoprolol  Currently not on ACE or spironolactone as outpatient  EP cardiologist informed of patient's admission

## 2019-10-25 NOTE — FLOWSHEET NOTE
10/24/19 1825   Events/Summary/Plan   Events/Summary/Plan SVN   Interdisciplinary Plan of Care-Goals (Indications)   Bronchodilator Indications Physical Exam / Hyperinflation / Wheezing (bronchospasm)   Interdisciplinary Plan of Care-Outcomes    Bronchodilator Outcome Diminished Wheezing and Volume of Air Movement Increased;Patient at Stable Baseline   Education   Education Yes - Pt. / Family has been Instructed in use of Respiratory Equipment;Yes - Pt. / Family has been Instructed in use of Respiratory Medications and Adverse Reactions   RT Assessment of Delivered Medications   Evaluation of Medication Delivery Daily Yes-- Pt /Family has been Instructed in use of Respiratory Medications and Adverse Reactions   SVN Group   #SVN Performed Yes   Given By: Mouthpiece   Respiratory WDL   Respiratory (WDL) X   Chest Exam   Respiration 19   Pulse (!) 59   Breath Sounds   Pre/Post Intervention Post Intervention Assessment   RUL Breath Sounds Crackles;Expiratory Wheezes   RML Breath Sounds Crackles;Expiratory Wheezes;Fine Crackles   RLL Breath Sounds Fine Crackles;Diminished   SILVANA Breath Sounds Crackles;Expiratory Wheezes   LLL Breath Sounds Crackles;Diminished   Oximetry   Continuous Oximetry Yes   Oxygen   Pulse Oximetry 96 %   O2 (LPM) 4   O2 Daily Delivery Respiratory  Silicone Nasal Cannula

## 2019-10-25 NOTE — PROGRESS NOTES
Nephrology Daily Progress Note    Date of Service  10/25/2019    Chief Complaint  79 y.o. male admitted 10/24/2019 with ESRD, SOB    Interval Problem Update  Pt had 2nd HD earlier today, doing better    Review of Systems  Review of Systems   Constitutional: Negative for chills, fever and malaise/fatigue.   Respiratory: Negative for cough and shortness of breath.    Cardiovascular: Negative for chest pain and leg swelling.   Gastrointestinal: Negative for nausea and vomiting.   Genitourinary: Negative for dysuria, frequency and urgency.        Physical Exam  Temp:  [36.4 °C (97.6 °F)-36.7 °C (98.1 °F)] 36.7 °C (98.1 °F)  Pulse:  [59-82] 82  Resp:  [19-47] 24  BP: ()/(32-60) 120/56  SpO2:  [86 %-99 %] 94 %    Physical Exam   Constitutional: He is oriented to person, place, and time. No distress.   HENT:   Mouth/Throat: No oropharyngeal exudate.   Eyes: No scleral icterus.   Cardiovascular: Normal rate and regular rhythm.   No murmur heard.  Pulmonary/Chest: Effort normal and breath sounds normal. No respiratory distress. He has no wheezes.   Musculoskeletal: He exhibits no edema or deformity.   Neurological: He is alert and oriented to person, place, and time.   Skin: Skin is warm. He is not diaphoretic. No erythema.   Psychiatric: He has a normal mood and affect. His behavior is normal.   Nursing note and vitals reviewed.      Fluids    Intake/Output Summary (Last 24 hours) at 10/25/2019 1500  Last data filed at 10/25/2019 1130  Gross per 24 hour   Intake 2450 ml   Output 5200 ml   Net -2750 ml       Laboratory  Recent Labs     10/23/19  1110 10/24/19  0858 10/25/19  0408   WBC 2.9* 8.2 9.1   RBC 3.56* 3.77* 3.06*   HEMOGLOBIN 11.0* 11.1* 9.5*   HEMATOCRIT 35.9* 37.4* 30.4*   .8* 99.2* 99.3*   MCH 30.9 29.4 31.0   MCHC 30.6* 29.7* 31.3*   RDW 60.6* 58.4* 60.0*   PLATELETCT 125* 213 160*   MPV 10.5 10.0 10.6     Recent Labs     10/23/19  1110 10/24/19  0858 10/25/19  0408   SODIUM 141 137 136   POTASSIUM  4.5 4.6 4.7   CHLORIDE 102 92* 96   CO2 26 23 23   GLUCOSE 67 109* 82   BUN 49* 55* 34*   CREATININE 6.39* 8.13* 5.77*   CALCIUM 9.2 10.4* 9.4     Recent Labs     10/23/19  1110 10/24/19  1330 10/25/19  0408   INR 2.53* 3.06* 4.34*     Recent Labs     10/24/19  0858 10/25/19  0408   NTPROBNP 21772* >50480*           Imaging  DX-CHEST-PORTABLE (1 VIEW)   Final Result      No significant interval change.      EC-ECHOCARDIOGRAM COMPLETE W/O CONT   Final Result      DX-CHEST-PORTABLE (1 VIEW)   Final Result      New dense right mid and lower lung zone consolidation is worrisome for pneumonia or aspiration      New moderate right pleural effusion      Likely underlying pulmonary edema and cardiac silhouette enlargement which is unchanged            Assessment/Plan  1 ESRD  2 resp failure  3 pleural effusion  4 Anemia  Plan  Continue to evaluate the need for HD daily  Renal diet  Daily labs  Renal dose all meds  Avoid nephrotoxins  Prognosis guarded.

## 2019-10-25 NOTE — PROGRESS NOTES
Report received. Assumed care of patient. Patient is currently receiving dialysis. Patient requesting to use restroom when dialysis complete, RN explained bedside commode to patient who reluctantly agrees to using it when dialysis is complete. All other needs are currently met. All safety precautions in place. Will continue to monitor.

## 2019-10-25 NOTE — ASSESSMENT & PLAN NOTE
Associated with right lower lobe infiltrate, appears free-flowing and simple on ultrasound  Plan for HD today, continue antibiotics, reassess tomorrow  -- diagnostic/therapeutic thoracentesis 10/26- 1050cc, transudative

## 2019-10-25 NOTE — FLOWSHEET NOTE
10/25/19 1533   Events/Summary/Plan   Non-Invasive Resp Device Site Inspection Completed Intact   Interdisciplinary Plan of Care-Goals (Indications)   Bronchodilator Indications Physical Exam / Hyperinflation / Wheezing (bronchospasm)   Interdisciplinary Plan of Care-Outcomes    Bronchodilator Outcome Improved Patient Appearance with Decreased use of Accessory Muscles   Education   Education Yes - Pt. / Family has been Instructed in use of Respiratory Medications and Adverse Reactions   RT Assessment of Delivered Medications   Evaluation of Medication Delivery Daily Yes-- Pt /Family has been Instructed in use of Respiratory Medications and Adverse Reactions   SVN Group   #SVN Performed Yes   Given By: Mouthpiece   Date SVN Next Change Due (Q 7 Days) 10/31/19   Chest Exam   Work Of Breathing / Effort Mild;Moderate   Respiration (!) 24   Pulse 88   Breath Sounds   RUL Breath Sounds Clear;Diminished   RML Breath Sounds Fine Crackles;Diminished   RLL Breath Sounds Fine Crackles;Diminished   SILVANA Breath Sounds Clear;Diminished   LLL Breath Sounds Diminished   Secretions   Cough Moist;Non Productive   How Sputum Obtained Spontaneous   Oximetry   #Pulse Oximetry (Single Determination) Yes   Oxygen   Home O2 LPM Flow 2.5 LPM   Pulse Oximetry 94 %   O2 (LPM) 2   O2 Daily Delivery Respiratory  Silicone Nasal Cannula

## 2019-10-25 NOTE — PROGRESS NOTES
Patient transferred to [facility] via wheelchair with CCT. All belongings, chart and medications sent with patient.

## 2019-10-25 NOTE — ASSESSMENT & PLAN NOTE
Noted on TTE on 10/24  HD stable currently  Continue to monitor, consider cardiology consult if persistent resp failure despite HD

## 2019-10-25 NOTE — FLOWSHEET NOTE
10/24/19 2254   Events/Summary/Plan   Events/Summary/Plan svn   Interdisciplinary Plan of Care-Goals (Indications)   Bronchodilator Indications Physical Exam / Hyperinflation / Wheezing (bronchospasm)   Interdisciplinary Plan of Care-Outcomes    Bronchodilator Outcome Diminished Wheezing and Volume of Air Movement Increased   Education   Education Yes - Pt. / Family has been Instructed in use of Respiratory Equipment;Yes - Pt. / Family has been Instructed in use of Respiratory Medications and Adverse Reactions   RT Assessment of Delivered Medications   Evaluation of Medication Delivery Daily Yes-- Pt /Family has been Instructed in use of Respiratory Medications and Adverse Reactions   SVN Group   #SVN Performed Yes   Given By: Mouthpiece   Respiratory WDL   Respiratory (WDL) X   Chest Exam   Respiration (!) 40   Pulse 66   Breath Sounds   Pre/Post Intervention Post Intervention Assessment   RUL Breath Sounds Crackles;Expiratory Wheezes   RML Breath Sounds Crackles;Expiratory Wheezes;Fine Crackles   RLL Breath Sounds Crackles;Diminished   SILVANA Breath Sounds Crackles;Diminished   LLL Breath Sounds Diminished;Crackles   Oximetry   Continuous Oximetry Yes   Oxygen   Pulse Oximetry 97 %   O2 (LPM) 4   O2 Daily Delivery Respiratory  Silicone Nasal Cannula

## 2019-10-25 NOTE — PROGRESS NOTES
Received report and assumed care of pt. Pt is alert and oriented resting in bed. Pt on O2 NC, tolerating well. Safety measures in place. All needs met, pt not reporting any pain at this time.

## 2019-10-25 NOTE — CONSULTS
Critical Care Consultation    Date of consult: 10/24/2019    Referring Physician  Eagle Fihser M.D.    Reason for Consultation  Respiratory distress, missed dialysis session    History of Presenting Illness  Parmjit Castelan is a very pleasant 79-year-old male with a 40-pack-year smoking history quit 4 years ago who presented to the [facility] emergency room on 10/24/2019 for complaints of shortness of breath on the setting of a missed dialysis session. Patient was awoken from sleep early in the morning on the day of admission dyspnea and a nonproductive cough.  He denied fevers.  He had undergone a cardiac ablation for atrial flutter 1 day prior to admission and was discharged from the hospital the day prior.  He had missed his dialysis session while he was in the hospital and on arrival to the ED here at [facility] his oxygen saturations were in the low 80s.  He was unable to maintain oxygen saturations on supplemental oxygen was started on BiPAP and transferred to the ICU.    Code Status  Full Code    Review of Systems  Review of Systems   Constitutional: Negative for chills, fever and weight loss.   HENT: Negative for congestion, sinus pain and sore throat.    Eyes: Negative for pain and discharge.   Respiratory: Positive for cough and shortness of breath. Negative for sputum production, wheezing and stridor.    Cardiovascular: Negative for chest pain, orthopnea and leg swelling.   Gastrointestinal: Negative for abdominal pain, diarrhea, nausea and vomiting.   Genitourinary: Negative for dysuria, frequency and urgency.   Musculoskeletal: Negative for myalgias.   Skin: Negative for rash.   Neurological: Negative for dizziness, sensory change, focal weakness, loss of consciousness and headaches.   Psychiatric/Behavioral: Negative.    All other systems reviewed and are negative.    Past Medical History   has a past medical history of Anesthesia, Anticoagulation monitoring, special range, Aortic  stenosis, Arterial leg ulcer ([facility]) (11/8/2018), Atrial flutter ([facility]) (6/12/2019), Basal cell carcinoma of left cheek (3/26/2015), BPH (7/14/2009), Bronchitis (12/25/2018), CAD (coronary artery disease) (2/20/2014), CATARACT, CHF (congestive heart failure), NYHA class II, chronic, systolic ([facility]) E F30 in setting of atrial flutter (6/13/2019), Chronic respiratory failure with hypoxia ([facility]) (5/8/2016), CKD (chronic kidney disease) stage 4, GFR 15-29 ml/min ([facility]) (1/15/2010), COPD (chronic obstructive pulmonary disease) ([facility]), Detached retina, Dialysis, EMPHYSEMA, Glaucoma, Gout, Heart burn, Heart murmur, Hypertension, Indigestion, Kidney cyst, Leg pain, bilateral (8/10/2015), On supplemental oxygen therapy, Peripheral vascular disease ([facility]) (8/10/2015), Pneumonia, Primary insomnia (3/22/2018), Proteinuria, PVD (peripheral vascular disease) ([facility]), and Sleep apnea.    Surgical History   has a past surgical history that includes cataract phaco with iol (4/8/08); av fistula creation (2/12/2010); av fistula revision (2/19/2010); av fistulogram (7/23/2010); angioplasty balloon (7/23/2010); av fistulogram (9/17/2010); angioplasty balloon (9/17/2010); vitrectomy posterior (1/18/2011); scleral buckling (1/18/2011); recovery (8/12/2011); vitrectomy posterior (10/11/2011); recovery (7/23/2012); recovery (1/29/2013); recovery (7/2/2013); av fistula thrombolysis (7/2/2013); recovery (12/17/2013); recovery (3/24/2014); recovery (7/29/2014); recovery (3/24/2015); recovery (12/23/2015); gastroscopy with biopsy (8/13/2016); colonoscopy - endo (8/15/2016); endoscopy procedure (3/21/2018); femoral endarterectomy (Left, 1/25/2019); femoral popliteal bypass (Left, 1/25/2019); angiogram (Left, 1/25/2019); other orthopedic surgery (1998); av fistula creation (Right, 2/21/2019); lisa (6/13/2019); cardioversion (6/13/2019); av fistula creation (Right, 8/6/2019); and cath placement (Right, 8/6/2019).    Family History  family history includes  Genitourinary () Problems in his maternal aunt; Hypertension in his brother and mother; Lung Disease in his father; Stroke in his mother.    Social History   reports that he quit smoking about 10 years ago. His smoking use included cigarettes. He has a 40.00 pack-year smoking history. He has never used smokeless tobacco. He reports that he does not drink alcohol or use drugs.    Medications  Home Medications     Reviewed by Eagle Fisher M.D. (Physician) on 10/24/19 at 1224  Med List Status: Complete   Medication Last Dose Status   albuterol 108 (90 Base) MCG/ACT Aero Soln inhalation aerosol PRN Active   calcium acetate (PHOS-LO) 667 MG Cap 10/24/2019 Active   gabapentin (NEURONTIN) 300 MG Cap UNK Active   levalbuterol (XOPENEX) 1.25 MG/3ML Nebu Soln 10/24/2019 Active   metoprolol (LOPRESSOR) 50 MG Tab 10/24/2019 Active   omeprazole (PRILOSEC) 40 MG delayed-release capsule 10/24/2019 Active   pravastatin (PRAVACHOL) 20 MG Tab 10/23/2019 Active   ROPINIRole (REQUIP) 0.5 MG Tab 10/24/2019 Active   tamsulosin (FLOMAX) 0.4 MG capsule 10/24/2019 Active   torsemide (DEMADEX) 10 MG tablet 10/24/2019 Active   warfarin (COUMADIN) 5 MG Tab 10/23/2019 Active              Current Facility-Administered Medications   Medication Dose Route Frequency Provider Last Rate Last Dose   • Respiratory Care per Protocol   Nebulization Continuous RT Christina Aviles M.D.       • levalbuterol (XOPENEX) 1.25 MG/3ML nebulizer solution 1.25 mg  1.25 mg Nebulization Q4HRS (RT) Christina Aviles M.D.   1.25 mg at 10/24/19 1828   • levalbuterol (XOPENEX) 1.25 MG/3ML nebulizer solution 1.25 mg  1.25 mg Nebulization Q2HRS PRN (RT) Christina Aviles M.D.   1.25 mg at 10/24/19 1047   • Respiratory Care per Protocol   Nebulization Continuous RT Eagle Fisher M.D.       • cefTRIAXone (ROCEPHIN) 2 g in  mL IVPB  2 g Intravenous Q24HRS Eagle Fisher M.D.   Stopped at 10/24/19 4471   • doxycycline monohydrate (ADOXA) tablet 100  mg  100 mg Oral Q12HRS Eagle Fisher M.D.   100 mg at 10/24/19 1314   • metroNIDAZOLE (FLAGYL) IVPB 500 mg  500 mg Intravenous Q8HRS Eagle Fisher M.D.   Stopped at 10/24/19 2214   • senna-docusate (PERICOLACE or SENOKOT S) 8.6-50 MG per tablet 2 Tab  2 Tab Oral BID Eagle Fisher M.D.        And   • polyethylene glycol/lytes (MIRALAX) PACKET 1 Packet  1 Packet Oral QDAY PRN Eagle Fisher M.D.        And   • magnesium hydroxide (MILK OF MAGNESIA) suspension 30 mL  30 mL Oral QDAY PRN Eagle Fisher M.D.        And   • bisacodyl (DULCOLAX) suppository 10 mg  10 mg Rectal QDAY PRN Eagle Fisher M.D.       • Respiratory Care per Protocol   Nebulization Continuous RT Eagle Fisher M.D.       • metoprolol (LOPRESSOR) tablet 25 mg  25 mg Oral BID Eagle Fisher M.D.   Stopped at 10/24/19 1800   • gabapentin (NEURONTIN) capsule 300 mg  300 mg Oral TID Eagle Fisher M.D.   300 mg at 10/24/19 1719   • [START ON 10/25/2019] omeprazole (PRILOSEC) capsule 40 mg  40 mg Oral QAM Eagle Fisher M.D.       • pravastatin (PRAVACHOL) tablet 20 mg  20 mg Oral Nightly Eagle Fisher M.D.   20 mg at 10/24/19 2114   • ROPINIRole (REQUIP) tablet 1 mg  1 mg Oral BID Eagle Fisher M.D.   1 mg at 10/24/19 1719   • [START ON 10/25/2019] tamsulosin (FLOMAX) capsule 0.4 mg  0.4 mg Oral AFTER BREAKFAST Eagle Fisher M.D.       • MD Alert...Warfarin per Pharmacy   Other PHARMACY TO DOSE Eagle Fisher M.D.       • heparin injection 3,800 Units  3,800 Units Intracatheter DIALYSIS PRN Fadi Najjar, M.D.   3,800 Units at 10/24/19 1538   • heparin injection 2,000 Units  2,000 Units Intravenous DIALYSIS PRN Fadi Najjar, M.D.   2,000 Units at 10/24/19 1310       Allergies  No Known Allergies    Vital Signs last 24 hours  Temp:  [36.2 °C (97.2 °F)-36.6 °C (97.9 °F)] 36.6 °C (97.9 °F)  Pulse:  [59-71] 68  Resp:  [17-47] 47  BP: ()/(38-77) 98/38  SpO2:  [96 %-99 %] 97 %    Physical Exam  Physical Exam    Constitutional: He is oriented to person, place, and time. He appears well-developed and well-nourished. He appears distressed.   HENT:   Mouth/Throat: Oropharynx is clear and moist. No oropharyngeal exudate.   Eyes: Conjunctivae are normal. Right eye exhibits no discharge. Left eye exhibits no discharge. No scleral icterus.   Neck: Normal range of motion. JVD present. No tracheal deviation present. No thyromegaly present.   Cardiovascular: Regular rhythm and normal heart sounds. Exam reveals no friction rub.   No murmur heard.  Inappropriately bradycardic   Pulmonary/Chest: No stridor. He is in respiratory distress. He has no wheezes. He has rales.   Decreased breath sounds right lung base    Tunneled hemodialysis line in right chest, insertion site appears clean dry and intact   Abdominal: Soft. Bowel sounds are normal. He exhibits no distension. There is no tenderness. There is no rebound.   Musculoskeletal: Normal range of motion. He exhibits edema.   Lymphadenopathy:     He has no cervical adenopathy.   Neurological: He is alert and oriented to person, place, and time.   Skin: Skin is warm. No rash noted. No erythema. No pallor.   Nursing note and vitals reviewed.      Fluids    Intake/Output Summary (Last 24 hours) at 10/24/2019 2228  Last data filed at 10/24/2019 2114  Gross per 24 hour   Intake 1100 ml   Output 2700 ml   Net -1600 ml       Laboratory  Recent Results (from the past 48 hour(s))   CBC WITH DIFFERENTIAL    Collection Time: 10/23/19 11:10 AM   Result Value Ref Range    WBC 2.9 (L) 4.8 - 10.8 K/uL    RBC 3.56 (L) 4.70 - 6.10 M/uL    Hemoglobin 11.0 (L) 14.0 - 18.0 g/dL    Hematocrit 35.9 (L) 42.0 - 52.0 %    .8 (H) 81.4 - 97.8 fL    MCH 30.9 27.0 - 33.0 pg    MCHC 30.6 (L) 33.7 - 35.3 g/dL    RDW 60.6 (H) 35.9 - 50.0 fL    Platelet Count 125 (L) 164 - 446 K/uL    MPV 10.5 9.0 - 12.9 fL    Neutrophils-Polys 67.30 44.00 - 72.00 %    Lymphocytes 17.40 (L) 22.00 - 41.00 %    Monocytes 11.50  0.00 - 13.40 %    Eosinophils 2.80 0.00 - 6.90 %    Basophils 0.70 0.00 - 1.80 %    Immature Granulocytes 0.30 0.00 - 0.90 %    Nucleated RBC 0.00 /100 WBC    Neutrophils (Absolute) 1.94 1.82 - 7.42 K/uL    Lymphs (Absolute) 0.50 (L) 1.00 - 4.80 K/uL    Monos (Absolute) 0.33 0.00 - 0.85 K/uL    Eos (Absolute) 0.08 0.00 - 0.51 K/uL    Baso (Absolute) 0.02 0.00 - 0.12 K/uL    Immature Granulocytes (abs) 0.01 0.00 - 0.11 K/uL    NRBC (Absolute) 0.00 K/uL   CMP    Collection Time: 10/23/19 11:10 AM   Result Value Ref Range    Sodium 141 135 - 145 mmol/L    Potassium 4.5 3.6 - 5.5 mmol/L    Chloride 102 96 - 112 mmol/L    Co2 26 20 - 33 mmol/L    Anion Gap 13.0 (H) 0.0 - 11.9    Glucose 67 65 - 99 mg/dL    Bun 49 (H) 8 - 22 mg/dL    Creatinine 6.39 (HH) 0.50 - 1.40 mg/dL    Calcium 9.2 8.5 - 10.5 mg/dL    AST(SGOT) 15 12 - 45 U/L    ALT(SGPT) 9 2 - 50 U/L    Alkaline Phosphatase 137 (H) 30 - 99 U/L    Total Bilirubin 0.7 0.1 - 1.5 mg/dL    Albumin 3.7 3.2 - 4.9 g/dL    Total Protein 7.0 6.0 - 8.2 g/dL    Globulin 3.3 1.9 - 3.5 g/dL    A-G Ratio 1.1 g/dL   PT    Collection Time: 10/23/19 11:10 AM   Result Value Ref Range    PT 28.2 (H) 12.0 - 14.6 sec    INR 2.53 (H) 0.87 - 1.13   ESTIMATED GFR    Collection Time: 10/23/19 11:10 AM   Result Value Ref Range    GFR If African American 10 (A) >60 mL/min/1.73 m 2    GFR If Non African American 9 (A) >60 mL/min/1.73 m 2   EKG    Collection Time: 10/23/19 11:31 AM   Result Value Ref Range    Report       [facility]    Test Date:  2019-10-23  Pt Name:    EDVIN GREEN               Department: [facility]  MRN:        [id number]                      Room:       [facility]  Gender:     Male                         Technician: CICI  :        1940                   Requested By:AZAR ROWLAND  Order #:    [id number]                    Reading MD: Jonathan Black MD    Measurements  Intervals                                Axis  Rate:       55                           P:           69  ID:         [id number]                          QRS:        -58  QRSD:       166                          T:          -46  QT:         552  QTc:        528    Interpretive Statements  SINUS BRADYCARDIA  FIRST DEGREE AV BLOCK  RIGHT BUNDLE BRANCH BLOCK  BASELINE WANDER IN LEAD(S) V2  Compared to ECG 10/10/2019 13:00:42  NO SIGNIFICANT CHANGE  Electronically Signed On 10- 11:52:10 PDT by Jonathan Black MD     EKG    Collection Time: 10/23/19  3:31 PM   Result Value Ref Range    Report       [facility]    Test Date:  2019-10-23  Pt Name:    EDVIN GREEN               Department: [facility]  MRN:        [id number]                      Room:       [facility]  Gender:     Male                         Technician: MISTI  :        1940                   Requested By:AZAR ROWLAND  Order #:    [id number]                    Reading MD: Jonathan Black MD    Measurements  Intervals                                Axis  Rate:       56                           P:          54  ID:         [id number]                          QRS:        -42  QRSD:       176                          T:          -1  QT:         580  QTc:        560    Interpretive Statements  SINUS BRADYCARDIA  FIRST DEGREE AV BLOCK  RIGHT BUNDLE BRANCH BLOCK  Compared to ECG 10/23/2019 11:31:28  No significant changes    Electronically Signed On 10- 17:44:04 PDT by Jonathan Black MD     CBC w/ Differential    Collection Time: 10/24/19  8:58 AM   Result Value Ref Range    WBC 8.2 4.8 - 10.8 K/uL    RBC 3.77 (L) 4.70 - 6.10 M/uL    Hemoglobin 11.1 (L) 14.0 - 18.0 g/dL    Hematocrit 37.4 (L) 42.0 - 52.0 %    MCV 99.2 (H) 81.4 - 97.8 fL    MCH 29.4 27.0 - 33.0 pg    MCHC 29.7 (L) 33.7 - 35.3 g/dL    RDW 58.4 (H) 35.9 - 50.0 fL    Platelet Count 213 164 - 446 K/uL    MPV 10.0 9.0 - 12.9 fL    Neutrophils-Polys 89.80 (H) 44.00 - 72.00 %    Lymphocytes 4.60 (L) 22.00 - 41.00 %    Monocytes 4.40 0.00 - 13.40 %    Eosinophils 0.40 0.00 - 6.90 %    Basophils 0.40 0.00 -  1.80 %    Immature Granulocytes 0.40 0.00 - 0.90 %    Nucleated RBC 0.00 /100 WBC    Neutrophils (Absolute) 7.38 1.82 - 7.42 K/uL    Lymphs (Absolute) 0.38 (L) 1.00 - 4.80 K/uL    Monos (Absolute) 0.36 0.00 - 0.85 K/uL    Eos (Absolute) 0.03 0.00 - 0.51 K/uL    Baso (Absolute) 0.03 0.00 - 0.12 K/uL    Immature Granulocytes (abs) 0.03 0.00 - 0.11 K/uL    NRBC (Absolute) 0.00 K/uL    Anisocytosis 1+     Macrocytosis 1+    Complete Metabolic Panel (CMP)    Collection Time: 10/24/19  8:58 AM   Result Value Ref Range    Sodium 137 135 - 145 mmol/L    Potassium 4.6 3.6 - 5.5 mmol/L    Chloride 92 (L) 96 - 112 mmol/L    Co2 23 20 - 33 mmol/L    Anion Gap 22.0 (H) 0.0 - 11.9    Glucose 109 (H) 65 - 99 mg/dL    Bun 55 (H) 8 - 22 mg/dL    Creatinine 8.13 (HH) 0.50 - 1.40 mg/dL    Calcium 10.4 (H) 8.4 - 10.2 mg/dL    AST(SGOT) 18 12 - 45 U/L    ALT(SGPT) 10 2 - 50 U/L    Alkaline Phosphatase 159 (H) 30 - 99 U/L    Total Bilirubin 0.8 0.1 - 1.5 mg/dL    Albumin 3.8 3.2 - 4.9 g/dL    Total Protein 7.3 6.0 - 8.2 g/dL    Globulin 3.5 1.9 - 3.5 g/dL    A-G Ratio 1.1 g/dL   proBrain Natriuretic Peptide, NT    Collection Time: 10/24/19  8:58 AM   Result Value Ref Range    NT-proBNP 77416 (H) 0 - 125 pg/mL   Troponin STAT    Collection Time: 10/24/19  8:58 AM   Result Value Ref Range    Troponin T 1033 (H) 6 - 19 ng/L   ESTIMATED GFR    Collection Time: 10/24/19  8:58 AM   Result Value Ref Range    GFR If  8 (A) >60 mL/min/1.73 m 2    GFR If Non African American 6 (A) >60 mL/min/1.73 m 2   PLATELET ESTIMATE    Collection Time: 10/24/19  8:58 AM   Result Value Ref Range    Plt Estimation Normal    MORPHOLOGY    Collection Time: 10/24/19  8:58 AM   Result Value Ref Range    RBC Morphology Present    DIFFERENTIAL COMMENT    Collection Time: 10/24/19  8:58 AM   Result Value Ref Range    Comments-Diff see below    LACTIC ACID    Collection Time: 10/24/19 10:15 AM   Result Value Ref Range    Lactic Acid 2.9 (H) 0.5 - 2.0  mmol/L   Influenza By PCR, A/B    Collection Time: 10/24/19  1:05 PM   Result Value Ref Range    Influenza virus A RNA Negative Negative    Influenza virus B, PCR Negative Negative   Procalcitonin    Collection Time: 10/24/19  1:10 PM   Result Value Ref Range    Procalcitonin 2.43 (H) <0.25 ng/mL   Prothrombin Time    Collection Time: 10/24/19  1:30 PM   Result Value Ref Range    PT 32.5 (H) 12.0 - 14.6 sec    INR 3.06 (H) 0.87 - 1.13   EC-ECHOCARDIOGRAM COMPLETE W/O CONT    Collection Time: 10/24/19  5:04 PM   Result Value Ref Range    Eject.Frac. MOD BP 61.4     Eject.Frac. MOD 4C 62.41     Eject.Frac. MOD 2C 59.4     Left Ventrical Ejection Fraction 60    LACTIC ACID    Collection Time: 10/24/19  7:34 PM   Result Value Ref Range    Lactic Acid 3.8 (H) 0.5 - 2.0 mmol/L     Influenza swab negative   Procalcitonin 2.43  Lactic acid 2.9  proBNP level 35,000  Troponin 1033    Imaging  Chest x-ray personally reviewed, showing a right lower lobe and middle lobe infiltrate as well as a large right pleural effusion    Bedside ultrasound confirmed large right pleural effusion    TTE showing LVEF 60%, grade 2 diastolic dysfunction, severely dilated left atrium, and severe aortic stenosis, estimated RVSP 50 mmHg    Assessment/Plan  Severe aortic stenosis- (present on admission)  Assessment & Plan  Noted on TTE on 10/24  HD stable currently  Continue to monitor, consider cardiology consult if persistent resp failure despite HD    HELGA (obstructive sleep apnea)- (present on admission)  Assessment & Plan  Continue BiPAP nightly    Pleural effusion, right  Assessment & Plan  Associated with right lower lobe infiltrate, appears free-flowing and simple on ultrasound  Plan for HD today, continue antibiotics, reassess tomorrow, potential diagnostic/therapeutic thoracentesis    Acute hypoxemic respiratory failure (HCC)  Assessment & Plan  Due to missed dialysis session resulting in hypervolemia/pulmonary edema, compounded by pleural  effusion and possible aspiration pneumonia  Continue ceftriaxone, doxycycline, Flagyl  Follow-up respiratory viral panel and flu swab  Hemodialysis emergently  Continue BiPAP, wean as tolerated maintain SPO2 greater than 92%    History of atrial flutter- (present on admission)  Assessment & Plan  Status post ablation on 10/23/2019  Currently in sinus rhythm  Discussed with EP cardiology  Cont coumadin INR 2-3    Discussed patient condition and risk of morbidity and/or mortality with RN, RT, Patient and cardiology.      The patient remains critically ill.  Critical care time = 48 minutes in directly providing and coordinating critical care and extensive data review.  No time overlap and excludes procedures.    This note was generated using voice recognition software which has a chance of producing errors of grammar and content.  I have made every reasonable attempt to find and correct any errors, but it should be expected that some may not be found prior to finalization of this note.  __________  Eagle Fisher MD  Pulmonary and Critical Care Medicine  [facility]

## 2019-10-25 NOTE — PROGRESS NOTES
[facility] Progress Note       HD ordered by Dr. Najjar. Treatment started at 0535 and ended at 0835.    Net UF removed: 2000mL.     Pt tolerated treatment well, Albumin given X1 for hypotension with effective result. See paper flow sheet for details. CVC patent, locked with Heparin 1,000 units. No s/s of infection present. Dressing in place, CDI. Report given to ZACH Alejandro RN.

## 2019-10-25 NOTE — PROGRESS NOTES
Paged Dr Gay regarding pts increased lactic acid from 2.9 to 3.8. Given pts history and current fluid status, per Dr GE do not give fluids and continue to trend lactic.

## 2019-10-25 NOTE — ASSESSMENT & PLAN NOTE
Due to missed dialysis session resulting in hypervolemia/pulmonary edema, compounded by pleural effusion and possible aspiration pneumonia  Continue ceftriaxone, doxycycline  Hemodialysis per nephrology  Cont supplemental O2 maintain SPO2 greater than 92%  -- s/p thoracentesis 10/26; transudative; 1050cc

## 2019-10-26 ENCOUNTER — APPOINTMENT (OUTPATIENT)
Dept: RADIOLOGY | Facility: MEDICAL CENTER | Age: 79
DRG: 189 | End: 2019-10-26
Attending: INTERNAL MEDICINE
Payer: MEDICARE

## 2019-10-26 ENCOUNTER — PATIENT OUTREACH (OUTPATIENT)
Dept: HEALTH INFORMATION MANAGEMENT | Facility: OTHER | Age: 79
End: 2019-10-26

## 2019-10-26 LAB
ALBUMIN SERPL BCP-MCNC: 3.3 G/DL (ref 3.2–4.9)
ALBUMIN/GLOB SERPL: 1.1 G/DL
ALP SERPL-CCNC: 105 U/L (ref 30–99)
ALT SERPL-CCNC: 10 U/L (ref 2–50)
ANION GAP SERPL CALC-SCNC: 15 MMOL/L (ref 0–11.9)
APPEARANCE FLD: NORMAL
AST SERPL-CCNC: 18 U/L (ref 12–45)
BACTERIA SPEC RESP CULT: NORMAL
BASOPHILS # BLD AUTO: 0.4 % (ref 0–1.8)
BASOPHILS # BLD: 0.02 K/UL (ref 0–0.12)
BASOPHILS NFR FLD: 1 %
BILIRUB SERPL-MCNC: 0.6 MG/DL (ref 0.1–1.5)
BODY FLD TYPE: NORMAL
BODY FLD TYPE: NORMAL
BUN SERPL-MCNC: 37 MG/DL (ref 8–22)
CALCIUM SERPL-MCNC: 10.1 MG/DL (ref 8.4–10.2)
CHLORIDE SERPL-SCNC: 99 MMOL/L (ref 96–112)
CO2 SERPL-SCNC: 25 MMOL/L (ref 20–33)
COLOR FLD: NORMAL
CREAT SERPL-MCNC: 4.79 MG/DL (ref 0.5–1.4)
CSF COMMENTS 1658: NORMAL
EOSINOPHIL # BLD AUTO: 0.19 K/UL (ref 0–0.51)
EOSINOPHIL NFR BLD: 3.5 % (ref 0–6.9)
EOSINOPHIL NFR FLD: 2 %
ERYTHROCYTE [DISTWIDTH] IN BLOOD BY AUTOMATED COUNT: 61.1 FL (ref 35.9–50)
GLOBULIN SER CALC-MCNC: 3 G/DL (ref 1.9–3.5)
GLUCOSE FLD-MCNC: 100 MG/DL
GLUCOSE SERPL-MCNC: 86 MG/DL (ref 65–99)
GRAM STN SPEC: NORMAL
HCT VFR BLD AUTO: 28.9 % (ref 42–52)
HGB BLD-MCNC: 8.9 G/DL (ref 14–18)
IMM GRANULOCYTES # BLD AUTO: 0.03 K/UL (ref 0–0.11)
IMM GRANULOCYTES NFR BLD AUTO: 0.6 % (ref 0–0.9)
INR PPP: 2.41 (ref 0.87–1.13)
INR PPP: 3.06 (ref 0.87–1.13)
LYMPHOCYTES # BLD AUTO: 0.34 K/UL (ref 1–4.8)
LYMPHOCYTES NFR BLD: 6.3 % (ref 22–41)
LYMPHOCYTES NFR FLD: 7 %
MCH RBC QN AUTO: 30.4 PG (ref 27–33)
MCHC RBC AUTO-ENTMCNC: 30.8 G/DL (ref 33.7–35.3)
MCV RBC AUTO: 98.6 FL (ref 81.4–97.8)
MESOTHL CELL NFR FLD: 28 %
MONOCYTES # BLD AUTO: 0.41 K/UL (ref 0–0.85)
MONOCYTES NFR BLD AUTO: 7.6 % (ref 0–13.4)
MONONUC CELLS NFR FLD: 4 %
NEUTROPHILS # BLD AUTO: 4.44 K/UL (ref 1.82–7.42)
NEUTROPHILS NFR BLD: 81.6 % (ref 44–72)
NEUTROPHILS NFR FLD: 58 %
NRBC # BLD AUTO: 0 K/UL
NRBC BLD-RTO: 0 /100 WBC
PH FLD: 8 [PH]
PLATELET # BLD AUTO: 152 K/UL (ref 164–446)
PMV BLD AUTO: 10.3 FL (ref 9–12.9)
POTASSIUM SERPL-SCNC: 4.4 MMOL/L (ref 3.6–5.5)
PROCALCITONIN SERPL-MCNC: 12.63 NG/ML
PROT FLD-MCNC: 3.1 G/DL
PROT SERPL-MCNC: 6.3 G/DL (ref 6–8.2)
PROTHROMBIN TIME: 26.9 SEC (ref 12–14.6)
PROTHROMBIN TIME: 32.5 SEC (ref 12–14.6)
RBC # BLD AUTO: 2.93 M/UL (ref 4.7–6.1)
RBC # FLD: <2000 CELLS/UL
SIGNIFICANT IND 70042: NORMAL
SITE SITE: NORMAL
SODIUM SERPL-SCNC: 139 MMOL/L (ref 135–145)
SOURCE SOURCE: NORMAL
WBC # BLD AUTO: 5.4 K/UL (ref 4.8–10.8)
WBC # FLD: 604 CELLS/UL

## 2019-10-26 PROCEDURE — 99233 SBSQ HOSP IP/OBS HIGH 50: CPT | Performed by: INTERNAL MEDICINE

## 2019-10-26 PROCEDURE — 700101 HCHG RX REV CODE 250: Performed by: INTERNAL MEDICINE

## 2019-10-26 PROCEDURE — 94760 N-INVAS EAR/PLS OXIMETRY 1: CPT

## 2019-10-26 PROCEDURE — 83986 ASSAY PH BODY FLUID NOS: CPT

## 2019-10-26 PROCEDURE — 89051 BODY FLUID CELL COUNT: CPT

## 2019-10-26 PROCEDURE — 82150 ASSAY OF AMYLASE: CPT

## 2019-10-26 PROCEDURE — 700102 HCHG RX REV CODE 250 W/ 637 OVERRIDE(OP): Performed by: INTERNAL MEDICINE

## 2019-10-26 PROCEDURE — 700111 HCHG RX REV CODE 636 W/ 250 OVERRIDE (IP): Performed by: INTERNAL MEDICINE

## 2019-10-26 PROCEDURE — A9270 NON-COVERED ITEM OR SERVICE: HCPCS | Performed by: INTERNAL MEDICINE

## 2019-10-26 PROCEDURE — 71045 X-RAY EXAM CHEST 1 VIEW: CPT

## 2019-10-26 PROCEDURE — 85610 PROTHROMBIN TIME: CPT

## 2019-10-26 PROCEDURE — 770020 HCHG ROOM/CARE - TELE (206)

## 2019-10-26 PROCEDURE — 84157 ASSAY OF PROTEIN OTHER: CPT

## 2019-10-26 PROCEDURE — 32555 ASPIRATE PLEURA W/ IMAGING: CPT | Performed by: INTERNAL MEDICINE

## 2019-10-26 PROCEDURE — 80053 COMPREHEN METABOLIC PANEL: CPT

## 2019-10-26 PROCEDURE — 99231 SBSQ HOSP IP/OBS SF/LOW 25: CPT | Performed by: INTERNAL MEDICINE

## 2019-10-26 PROCEDURE — 84145 PROCALCITONIN (PCT): CPT

## 2019-10-26 PROCEDURE — 94640 AIRWAY INHALATION TREATMENT: CPT

## 2019-10-26 PROCEDURE — 0W993ZZ DRAINAGE OF RIGHT PLEURAL CAVITY, PERCUTANEOUS APPROACH: ICD-10-PCS | Performed by: INTERNAL MEDICINE

## 2019-10-26 PROCEDURE — 85025 COMPLETE CBC W/AUTO DIFF WBC: CPT

## 2019-10-26 PROCEDURE — 83615 LACTATE (LD) (LDH) ENZYME: CPT

## 2019-10-26 PROCEDURE — 82945 GLUCOSE OTHER FLUID: CPT

## 2019-10-26 PROCEDURE — 700105 HCHG RX REV CODE 258: Performed by: INTERNAL MEDICINE

## 2019-10-26 PROCEDURE — 99233 SBSQ HOSP IP/OBS HIGH 50: CPT | Mod: 25 | Performed by: INTERNAL MEDICINE

## 2019-10-26 RX ORDER — GUAIFENESIN 600 MG/1
1200 TABLET, EXTENDED RELEASE ORAL EVERY 12 HOURS
Status: DISCONTINUED | OUTPATIENT
Start: 2019-10-26 | End: 2019-10-27 | Stop reason: HOSPADM

## 2019-10-26 RX ADMIN — DOXYCYCLINE 100 MG: 100 TABLET, FILM COATED ORAL at 06:05

## 2019-10-26 RX ADMIN — METOPROLOL TARTRATE 25 MG: 25 TABLET ORAL at 18:21

## 2019-10-26 RX ADMIN — CALCIUM ACETATE 1334 MG: 667 CAPSULE ORAL at 18:20

## 2019-10-26 RX ADMIN — PRAVASTATIN SODIUM 20 MG: 20 TABLET ORAL at 20:45

## 2019-10-26 RX ADMIN — LEVALBUTEROL HYDROCHLORIDE 1.25 MG: 1.25 SOLUTION RESPIRATORY (INHALATION) at 18:35

## 2019-10-26 RX ADMIN — DOXYCYCLINE 100 MG: 100 TABLET, FILM COATED ORAL at 18:21

## 2019-10-26 RX ADMIN — ROPINIROLE HYDROCHLORIDE 1 MG: 1 TABLET, FILM COATED ORAL at 18:21

## 2019-10-26 RX ADMIN — GABAPENTIN 300 MG: 300 CAPSULE ORAL at 06:05

## 2019-10-26 RX ADMIN — ROPINIROLE HYDROCHLORIDE 1 MG: 1 TABLET, FILM COATED ORAL at 06:05

## 2019-10-26 RX ADMIN — GABAPENTIN 300 MG: 300 CAPSULE ORAL at 18:21

## 2019-10-26 RX ADMIN — GUAIFENESIN 1200 MG: 600 TABLET, EXTENDED RELEASE ORAL at 18:21

## 2019-10-26 RX ADMIN — CEFTRIAXONE SODIUM 2 G: 2 INJECTION, POWDER, FOR SOLUTION INTRAMUSCULAR; INTRAVENOUS at 06:05

## 2019-10-26 RX ADMIN — GABAPENTIN 300 MG: 300 CAPSULE ORAL at 12:36

## 2019-10-26 RX ADMIN — LEVALBUTEROL HYDROCHLORIDE 1.25 MG: 1.25 SOLUTION RESPIRATORY (INHALATION) at 14:22

## 2019-10-26 RX ADMIN — METOPROLOL TARTRATE 25 MG: 25 TABLET ORAL at 06:05

## 2019-10-26 RX ADMIN — GUAIFENESIN 1200 MG: 600 TABLET, EXTENDED RELEASE ORAL at 12:36

## 2019-10-26 RX ADMIN — TAMSULOSIN HYDROCHLORIDE 0.4 MG: 0.4 CAPSULE ORAL at 07:59

## 2019-10-26 RX ADMIN — LEVALBUTEROL HYDROCHLORIDE 1.25 MG: 1.25 SOLUTION RESPIRATORY (INHALATION) at 08:55

## 2019-10-26 RX ADMIN — OMEPRAZOLE 40 MG: 20 CAPSULE, DELAYED RELEASE ORAL at 06:05

## 2019-10-26 RX ADMIN — CALCIUM ACETATE 1334 MG: 667 CAPSULE ORAL at 06:05

## 2019-10-26 ASSESSMENT — ENCOUNTER SYMPTOMS
SPUTUM PRODUCTION: 0
NAUSEA: 0
FOCAL WEAKNESS: 0
PSYCHIATRIC NEGATIVE: 1
SORE THROAT: 0
NERVOUS/ANXIOUS: 1
LOSS OF CONSCIOUSNESS: 0
WHEEZING: 1
EYE DISCHARGE: 0
ORTHOPNEA: 0
SENSORY CHANGE: 0
FEVER: 0
DIZZINESS: 0
COUGH: 1
COUGH: 0
SINUS PAIN: 0
STRIDOR: 0
DIARRHEA: 0
WHEEZING: 0
SHORTNESS OF BREATH: 1
WEIGHT LOSS: 0
CONSTIPATION: 0
SPUTUM PRODUCTION: 1
MYALGIAS: 0
EYE PAIN: 0
SHORTNESS OF BREATH: 0
PALPITATIONS: 0
HEADACHES: 0
VOMITING: 0
CHILLS: 0
ABDOMINAL PAIN: 0
EYE REDNESS: 0
DIAPHORESIS: 0

## 2019-10-26 NOTE — FLOWSHEET NOTE
10/26/19 1422   Interdisciplinary Plan of Care-Goals (Indications)   Bronchodilator Indications Physical Exam / Hyperinflation / Wheezing (bronchospasm)   Interdisciplinary Plan of Care-Outcomes    Bronchodilator Outcome Improved Patient Appearance with Decreased use of Accessory Muscles   Education   Education Yes - Pt. / Family has been Instructed in use of Respiratory Medications and Adverse Reactions   RT Assessment of Delivered Medications   Evaluation of Medication Delivery Daily Yes-- Pt /Family has been Instructed in use of Respiratory Medications and Adverse Reactions   SVN Group   #SVN Performed Yes   Given By: Mouthpiece   Respiratory WDL   Respiratory (WDL) X   Chest Exam   Work Of Breathing / Effort Moderate   Respiration 19   Pulse 77   Breath Sounds   Pre/Post Intervention Pre Intervention Assessment   RUL Breath Sounds Expiratory Wheezes   RML Breath Sounds Crackles   RLL Breath Sounds Diminished   SILVANA Breath Sounds Expiratory Wheezes   LLL Breath Sounds Crackles;Diminished   Secretions   Cough Congested   How Sputum Obtained Spontaneous   Oximetry   #Pulse Oximetry (Single Determination) Yes   Oxygen   Pulse Oximetry 95 %   O2 (LPM) 2   O2 Daily Delivery Respiratory  Silicone Nasal Cannula

## 2019-10-26 NOTE — PLAN OF CARE (IOPOC)
Pt wants to walk by self, states he does not need walker or 02, reinforced importance, pt now refusing to walk.

## 2019-10-26 NOTE — FLOWSHEET NOTE
10/26/19 0900   Events/Summary/Plan   Non-Invasive Resp Device Site Inspection Completed Intact   Interdisciplinary Plan of Care-Goals (Indications)   Bronchodilator Indications Physical Exam / Hyperinflation / Wheezing (bronchospasm)   Interdisciplinary Plan of Care-Outcomes    Bronchodilator Outcome Improved Patient Appearance with Decreased use of Accessory Muscles;Improved Vital Signs and Measures of Gas Exchange   Education   Education Yes - Pt. / Family has been Instructed in use of Respiratory Equipment   RT Assessment of Delivered Medications   Evaluation of Medication Delivery Daily Yes-- Pt /Family has been Instructed in use of Respiratory Medications and Adverse Reactions   SVN Group   #SVN Performed Yes   Given By: Mouthpiece   Date SVN Next Change Due (Q 7 Days) 10/31/19   Chest Exam   Work Of Breathing / Effort Moderate   Respiration (!) 24   Pulse 76   Breath Sounds   RUL Breath Sounds Expiratory Wheezes   RML Breath Sounds Crackles   RLL Breath Sounds Diminished   SILVANA Breath Sounds Expiratory Wheezes   LLL Breath Sounds Crackles;Diminished   Secretions   Cough Congested   How Sputum Obtained Spontaneous   Oximetry   #Pulse Oximetry (Single Determination) Yes   Oxygen   Pulse Oximetry 96 %   O2 (LPM) 2   O2 Daily Delivery Respiratory  Silicone Nasal Cannula

## 2019-10-26 NOTE — PLAN OF CARE (IOPOC)
"0715 pt states \"let me sleep\" when doing assessment.   0830 pt walks up irratable that breakfast is cold, rewarmed breakfast, pt remains irritable. Wife calls, states she \"knows management at [facility]\" and has doctors in her family.   1245 wife calls author to bedside, states she called her pharmacist she knows outpt and warfin and doxy are not compatible. Author addressed multiple times that the pt is not on warfin at this time.    1630 Wife continuously through shift get author from other pt rooms to re-ask questions, remains angry and unhappy, boundary setting re attempted.  CN E/J updated.   "

## 2019-10-26 NOTE — PLAN OF CARE (IOPOC)
Dr Huerta at bedside going over POC with wife, pt again, author present. All questions answered. Unclear if pt/wife grasping concept with same mult questions.

## 2019-10-26 NOTE — PROGRESS NOTES
Telemetry Shift Summary    Rhythm SR with first degree block and BBB  HR Range 71-81  Ectopy none  Measurements 0.22/0.16/0.44        Normal Values  Rhythm SR  HR Range    Measurements 0.12-0.20 / 0.06-0.10  / 0.30-0.52

## 2019-10-26 NOTE — PROGRESS NOTES
PT refused to use front wheel walker to pivot from bed to walker. He got dizzy once he stood up and almost fell back on his bed. Used hand held assist to pivot.     Overheard a call from the patient's wife to complain about the service.

## 2019-10-26 NOTE — PROGRESS NOTES
Nephrology Daily Progress Note    Date of Service  10/26/2019    Chief Complaint  79 y.o. male admitted 10/24/2019 with ESRD, SOB    Interval Problem Update  Thoracentesis was postponed because of high INR    Review of Systems  Review of Systems   Constitutional: Positive for malaise/fatigue. Negative for chills and fever.   Respiratory: Positive for shortness of breath. Negative for cough.    Cardiovascular: Negative for chest pain and leg swelling.   Gastrointestinal: Negative for nausea and vomiting.   Genitourinary: Negative for dysuria, frequency and urgency.        Physical Exam  Temp:  [36.3 °C (97.3 °F)-37.1 °C (98.7 °F)] 36.3 °C (97.4 °F)  Pulse:  [72-88] 75  Resp:  [18-24] 24  BP: (125-144)/(43-60) 136/59  SpO2:  [90 %-100 %] 95 %    Physical Exam   Constitutional: He is oriented to person, place, and time. No distress.   HENT:   Mouth/Throat: No oropharyngeal exudate.   Eyes: No scleral icterus.   Cardiovascular: Normal rate and regular rhythm.   No murmur heard.  Pulmonary/Chest: Effort normal and breath sounds normal. No respiratory distress. He has no wheezes.   Musculoskeletal: He exhibits no edema or deformity.   Neurological: He is alert and oriented to person, place, and time.   Skin: Skin is warm. He is not diaphoretic. No erythema.   Psychiatric: He has a normal mood and affect. His behavior is normal.   Nursing note and vitals reviewed.      Fluids  No intake or output data in the 24 hours ending 10/26/19 1338    Laboratory  Recent Labs     10/24/19  0858 10/25/19  0408 10/26/19  0348   WBC 8.2 9.1 5.4   RBC 3.77* 3.06* 2.93*   HEMOGLOBIN 11.1* 9.5* 8.9*   HEMATOCRIT 37.4* 30.4* 28.9*   MCV 99.2* 99.3* 98.6*   MCH 29.4 31.0 30.4   MCHC 29.7* 31.3* 30.8*   RDW 58.4* 60.0* 61.1*   PLATELETCT 213 160* 152*   MPV 10.0 10.6 10.3     Recent Labs     10/24/19  0858 10/25/19  0408 10/26/19  0348   SODIUM 137 136 139   POTASSIUM 4.6 4.7 4.4   CHLORIDE 92* 96 99   CO2 23 23 25   GLUCOSE 109* 82 86   BUN  55* 34* 37*   CREATININE 8.13* 5.77* 4.79*   CALCIUM 10.4* 9.4 10.1     Recent Labs     10/24/19  1330 10/25/19  0408 10/26/19  0348   INR 3.06* 4.34* 3.06*     Recent Labs     10/24/19  0858 10/25/19  0408   NTPROBNP 77677* >12783*           Imaging  DX-CHEST-PORTABLE (1 VIEW)   Final Result      Stable chest appearance compared with 10/25.      DX-CHEST-PORTABLE (1 VIEW)   Final Result      No significant interval change.      EC-ECHOCARDIOGRAM COMPLETE W/O CONT   Final Result      DX-CHEST-PORTABLE (1 VIEW)   Final Result      New dense right mid and lower lung zone consolidation is worrisome for pneumonia or aspiration      New moderate right pleural effusion      Likely underlying pulmonary edema and cardiac silhouette enlargement which is unchanged            Assessment/Plan  1 ESRD  2 resp failure  3 pleural effusion  4 Anemia  Plan  no need for HD today  Epo with HD  Renal diet  Daily labs  Renal dose all meds  Avoid nephrotoxins  Prognosis guarded.

## 2019-10-26 NOTE — PROGRESS NOTES
2 RN skin check complete.   Devices in place tele monitor, nasal cannula.  Skin assessed under devices intact.  Confirmed pressure ulcers found on n/a.  New potential pressure ulcers noted on n/a.   Wound consult placed for right big toe and right knee.  Necrotic skin found on right big toe. Patient states it's from poor blood flow due to vein stripping surgery.  Bruising on right knee. Patient does not remember how it occurred.   The following interventions in place patient turns self side to side, patient ambulates occassionally, silicone tubing used for nasal cannula*

## 2019-10-27 ENCOUNTER — APPOINTMENT (OUTPATIENT)
Dept: RADIOLOGY | Facility: MEDICAL CENTER | Age: 79
DRG: 189 | End: 2019-10-27
Attending: INTERNAL MEDICINE
Payer: MEDICARE

## 2019-10-27 VITALS
OXYGEN SATURATION: 94 % | BODY MASS INDEX: 25.26 KG/M2 | SYSTOLIC BLOOD PRESSURE: 136 MMHG | DIASTOLIC BLOOD PRESSURE: 41 MMHG | HEIGHT: 68 IN | TEMPERATURE: 97.6 F | HEART RATE: 66 BPM | WEIGHT: 166.67 LBS | RESPIRATION RATE: 20 BRPM

## 2019-10-27 LAB
ALBUMIN SERPL BCP-MCNC: 3.4 G/DL (ref 3.2–4.9)
ALBUMIN/GLOB SERPL: 1.1 G/DL
ALP SERPL-CCNC: 114 U/L (ref 30–99)
ALT SERPL-CCNC: 10 U/L (ref 2–50)
AMYLASE FLD-CCNC: 76 U/L
ANION GAP SERPL CALC-SCNC: 17 MMOL/L (ref 0–11.9)
AST SERPL-CCNC: 16 U/L (ref 12–45)
BASOPHILS # BLD AUTO: 0.2 % (ref 0–1.8)
BASOPHILS # BLD: 0.01 K/UL (ref 0–0.12)
BILIRUB SERPL-MCNC: 0.5 MG/DL (ref 0.1–1.5)
BODY FLD TYPE: NORMAL
BUN SERPL-MCNC: 54 MG/DL (ref 8–22)
CALCIUM SERPL-MCNC: 10.6 MG/DL (ref 8.4–10.2)
CHLORIDE SERPL-SCNC: 98 MMOL/L (ref 96–112)
CO2 SERPL-SCNC: 24 MMOL/L (ref 20–33)
CREAT SERPL-MCNC: 6.41 MG/DL (ref 0.5–1.4)
EOSINOPHIL # BLD AUTO: 0.3 K/UL (ref 0–0.51)
EOSINOPHIL NFR BLD: 5.6 % (ref 0–6.9)
ERYTHROCYTE [DISTWIDTH] IN BLOOD BY AUTOMATED COUNT: 59.3 FL (ref 35.9–50)
GLOBULIN SER CALC-MCNC: 3.2 G/DL (ref 1.9–3.5)
GLUCOSE SERPL-MCNC: 94 MG/DL (ref 65–99)
HCT VFR BLD AUTO: 29.4 % (ref 42–52)
HGB BLD-MCNC: 8.9 G/DL (ref 14–18)
IMM GRANULOCYTES # BLD AUTO: 0.03 K/UL (ref 0–0.11)
IMM GRANULOCYTES NFR BLD AUTO: 0.6 % (ref 0–0.9)
INR PPP: 2.17 (ref 0.87–1.13)
LDH FLD L TO P-CCNC: 108 U/L
LYMPHOCYTES # BLD AUTO: 0.48 K/UL (ref 1–4.8)
LYMPHOCYTES NFR BLD: 9 % (ref 22–41)
MCH RBC QN AUTO: 29.8 PG (ref 27–33)
MCHC RBC AUTO-ENTMCNC: 30.3 G/DL (ref 33.7–35.3)
MCV RBC AUTO: 98.3 FL (ref 81.4–97.8)
MONOCYTES # BLD AUTO: 0.31 K/UL (ref 0–0.85)
MONOCYTES NFR BLD AUTO: 5.8 % (ref 0–13.4)
NEUTROPHILS # BLD AUTO: 4.2 K/UL (ref 1.82–7.42)
NEUTROPHILS NFR BLD: 78.8 % (ref 44–72)
NRBC # BLD AUTO: 0 K/UL
NRBC BLD-RTO: 0 /100 WBC
PLATELET # BLD AUTO: 157 K/UL (ref 164–446)
PMV BLD AUTO: 10.4 FL (ref 9–12.9)
POTASSIUM SERPL-SCNC: 4.8 MMOL/L (ref 3.6–5.5)
PROT SERPL-MCNC: 6.6 G/DL (ref 6–8.2)
PROTHROMBIN TIME: 24.7 SEC (ref 12–14.6)
RBC # BLD AUTO: 2.99 M/UL (ref 4.7–6.1)
SODIUM SERPL-SCNC: 139 MMOL/L (ref 135–145)
WBC # BLD AUTO: 5.3 K/UL (ref 4.8–10.8)

## 2019-10-27 PROCEDURE — 71045 X-RAY EXAM CHEST 1 VIEW: CPT

## 2019-10-27 PROCEDURE — 99239 HOSP IP/OBS DSCHRG MGMT >30: CPT | Performed by: INTERNAL MEDICINE

## 2019-10-27 PROCEDURE — 700102 HCHG RX REV CODE 250 W/ 637 OVERRIDE(OP): Performed by: INTERNAL MEDICINE

## 2019-10-27 PROCEDURE — 94640 AIRWAY INHALATION TREATMENT: CPT

## 2019-10-27 PROCEDURE — 85610 PROTHROMBIN TIME: CPT

## 2019-10-27 PROCEDURE — 99231 SBSQ HOSP IP/OBS SF/LOW 25: CPT | Performed by: INTERNAL MEDICINE

## 2019-10-27 PROCEDURE — 94760 N-INVAS EAR/PLS OXIMETRY 1: CPT

## 2019-10-27 PROCEDURE — 99232 SBSQ HOSP IP/OBS MODERATE 35: CPT | Performed by: INTERNAL MEDICINE

## 2019-10-27 PROCEDURE — A9270 NON-COVERED ITEM OR SERVICE: HCPCS | Performed by: INTERNAL MEDICINE

## 2019-10-27 PROCEDURE — 80053 COMPREHEN METABOLIC PANEL: CPT

## 2019-10-27 PROCEDURE — 700111 HCHG RX REV CODE 636 W/ 250 OVERRIDE (IP): Performed by: INTERNAL MEDICINE

## 2019-10-27 PROCEDURE — 85025 COMPLETE CBC W/AUTO DIFF WBC: CPT

## 2019-10-27 PROCEDURE — 700105 HCHG RX REV CODE 258: Performed by: INTERNAL MEDICINE

## 2019-10-27 PROCEDURE — 700101 HCHG RX REV CODE 250: Performed by: INTERNAL MEDICINE

## 2019-10-27 RX ORDER — DOXYCYCLINE 100 MG/1
100 CAPSULE ORAL 2 TIMES DAILY
Qty: 10 CAP | Refills: 0 | Status: SHIPPED | OUTPATIENT
Start: 2019-10-27 | End: 2019-11-05

## 2019-10-27 RX ORDER — GUAIFENESIN/DEXTROMETHORPHAN 100-10MG/5
5 SYRUP ORAL EVERY 6 HOURS PRN
Status: DISCONTINUED | OUTPATIENT
Start: 2019-10-27 | End: 2019-10-27 | Stop reason: HOSPADM

## 2019-10-27 RX ORDER — GUAIFENESIN 1200 MG/1
1200 TABLET, EXTENDED RELEASE ORAL EVERY 12 HOURS
Qty: 28 TAB | COMMUNITY
Start: 2019-10-27 | End: 2019-11-05

## 2019-10-27 RX ORDER — CEFDINIR 300 MG/1
300 CAPSULE ORAL 2 TIMES DAILY
Qty: 10 CAP | Refills: 0 | Status: SHIPPED | OUTPATIENT
Start: 2019-10-27 | End: 2019-11-05

## 2019-10-27 RX ORDER — WARFARIN SODIUM 5 MG/1
5 TABLET ORAL DAILY
Status: DISCONTINUED | OUTPATIENT
Start: 2019-10-27 | End: 2019-10-27 | Stop reason: HOSPADM

## 2019-10-27 RX ORDER — WARFARIN SODIUM 5 MG/1
TABLET ORAL
Qty: 45 TAB | Refills: 0 | Status: SHIPPED
Start: 2019-10-27 | End: 2020-02-06

## 2019-10-27 RX ADMIN — GABAPENTIN 300 MG: 300 CAPSULE ORAL at 12:04

## 2019-10-27 RX ADMIN — OMEPRAZOLE 40 MG: 20 CAPSULE, DELAYED RELEASE ORAL at 05:54

## 2019-10-27 RX ADMIN — GABAPENTIN 300 MG: 300 CAPSULE ORAL at 05:54

## 2019-10-27 RX ADMIN — METOPROLOL TARTRATE 25 MG: 25 TABLET ORAL at 05:55

## 2019-10-27 RX ADMIN — DOXYCYCLINE 100 MG: 100 TABLET, FILM COATED ORAL at 05:54

## 2019-10-27 RX ADMIN — CALCIUM ACETATE 1334 MG: 667 CAPSULE ORAL at 12:04

## 2019-10-27 RX ADMIN — LEVALBUTEROL HYDROCHLORIDE 1.25 MG: 1.25 SOLUTION RESPIRATORY (INHALATION) at 11:48

## 2019-10-27 RX ADMIN — TAMSULOSIN HYDROCHLORIDE 0.4 MG: 0.4 CAPSULE ORAL at 10:14

## 2019-10-27 RX ADMIN — CEFTRIAXONE SODIUM 2 G: 2 INJECTION, POWDER, FOR SOLUTION INTRAMUSCULAR; INTRAVENOUS at 05:58

## 2019-10-27 RX ADMIN — CALCIUM ACETATE 1334 MG: 667 CAPSULE ORAL at 06:04

## 2019-10-27 RX ADMIN — ROPINIROLE HYDROCHLORIDE 1 MG: 1 TABLET, FILM COATED ORAL at 05:55

## 2019-10-27 RX ADMIN — LEVALBUTEROL HYDROCHLORIDE 1.25 MG: 1.25 SOLUTION RESPIRATORY (INHALATION) at 07:20

## 2019-10-27 RX ADMIN — GUAIFENESIN 1200 MG: 600 TABLET, EXTENDED RELEASE ORAL at 05:55

## 2019-10-27 ASSESSMENT — ENCOUNTER SYMPTOMS
DIARRHEA: 0
NAUSEA: 0
COUGH: 1
WEIGHT LOSS: 0
DIZZINESS: 0
EYE PAIN: 0
COUGH: 0
STRIDOR: 0
SPUTUM PRODUCTION: 0
VOMITING: 0
FEVER: 0
MYALGIAS: 0
LOSS OF CONSCIOUSNESS: 0
CHILLS: 0
SHORTNESS OF BREATH: 1
FOCAL WEAKNESS: 0
PSYCHIATRIC NEGATIVE: 1
SENSORY CHANGE: 0
WHEEZING: 0
HEADACHES: 0
EYE DISCHARGE: 0
SHORTNESS OF BREATH: 0
ABDOMINAL PAIN: 0
ORTHOPNEA: 0
SINUS PAIN: 0
SORE THROAT: 0

## 2019-10-27 NOTE — PROGRESS NOTES
Gave bedside report to ROCHELLE Coello. Plan of care discussed. Safety precautions in place. Personal belongings and call light are with in reach. Patient resting comfortably in bed with eyes closed.

## 2019-10-27 NOTE — PROGRESS NOTES
Hospital Medicine Daily Progress Note    Date of Service  10/26/2019    Chief Complaint  SOB    Hospital Course    *79-year-old male with history of end-stage renal disease on dialysis, atrial flutter, aortic stenosis, anticoagulated on warfarin recently underwent ablation.  On that day he missed hemodialysis, he presented with respiratory failure, volume overload and initially required BiPAP.  He underwent to hemodialysis and is back to his baseline of 2 to 2-1/2 L.  However he has significant rhonchi and procalcitonin is rising. *      Interval Problem Update  Patient anxious to go home says his breathing is at baseline but his lungs sounded terrible  Was not wanting to wait for thoracentesis but then wife arrived and agrees that he sounds terrible and that he should remain in the hospital.  Discussed with pulmonary  Patient will do as his wife wishes  Seen and discussed with RN    Consultants/Specialty  Pulmonary medicine    Code Status  Full per H&P    Disposition  Undetermined    Review of Systems  Review of Systems   Constitutional: Negative for chills, diaphoresis, fever, malaise/fatigue and weight loss.   HENT: Negative for sinus pain and sore throat.    Eyes: Negative for redness.   Respiratory: Positive for cough, sputum production (Unable to expectorate) and wheezing. Negative for shortness of breath.    Cardiovascular: Negative for chest pain, palpitations and leg swelling.   Gastrointestinal: Negative for abdominal pain, constipation, diarrhea, nausea and vomiting.   Genitourinary: Negative for dysuria.   Skin: Negative for itching and rash.   Neurological: Negative for dizziness and headaches.   Psychiatric/Behavioral: The patient is nervous/anxious.         Physical Exam  Temp:  [36.3 °C (97.3 °F)-37.1 °C (98.7 °F)] 36.8 °C (98.2 °F)  Pulse:  [72-83] 73  Resp:  [18-24] 20  BP: (125-144)/(50-60) 137/53  SpO2:  [95 %-100 %] 95 %    Physical Exam   Constitutional: He is oriented to person, place, and  time. He appears well-developed. He appears distressed (Anxious).   Chronically ill and frail appearing looks 20 years older than stated age   HENT:   Head: Normocephalic and atraumatic.   Mouth/Throat: Oropharynx is clear and moist.   Eyes: Pupils are equal, round, and reactive to light. Conjunctivae and EOM are normal. Right eye exhibits no discharge. Left eye exhibits no discharge. No scleral icterus.   Neck: Neck supple.   Cardiovascular: Normal rate and regular rhythm.   Murmur heard.  Pulmonary/Chest: No respiratory distress. He has wheezes. He has no rales. He exhibits no tenderness.   Scattered wet junky rhonchi  Tunneled permacath right chest   Abdominal: Soft. Bowel sounds are normal. He exhibits no distension. There is no tenderness.   Musculoskeletal: He exhibits no edema or tenderness.   Neurological: He is alert and oriented to person, place, and time. No cranial nerve deficit.   Skin: Skin is warm and dry. He is not diaphoretic.   Psychiatric: His mood appears anxious. He is agitated. He expresses impulsivity.   Nursing note and vitals reviewed.      Fluids  No intake or output data in the 24 hours ending 10/26/19 1736    Laboratory  Recent Labs     10/24/19  0858 10/25/19  0408 10/26/19  0348   WBC 8.2 9.1 5.4   RBC 3.77* 3.06* 2.93*   HEMOGLOBIN 11.1* 9.5* 8.9*   HEMATOCRIT 37.4* 30.4* 28.9*   MCV 99.2* 99.3* 98.6*   MCH 29.4 31.0 30.4   MCHC 29.7* 31.3* 30.8*   RDW 58.4* 60.0* 61.1*   PLATELETCT 213 160* 152*   MPV 10.0 10.6 10.3     Recent Labs     10/24/19  0858 10/25/19  0408 10/26/19  0348   SODIUM 137 136 139   POTASSIUM 4.6 4.7 4.4   CHLORIDE 92* 96 99   CO2 23 23 25   GLUCOSE 109* 82 86   BUN 55* 34* 37*   CREATININE 8.13* 5.77* 4.79*   CALCIUM 10.4* 9.4 10.1     Recent Labs     10/25/19  0408 10/26/19  0348 10/26/19  1447   INR 4.34* 3.06* 2.41*               Imaging  DX-CHEST-PORTABLE (1 VIEW)   Final Result      Stable chest appearance compared with 10/25.      DX-CHEST-PORTABLE (1 VIEW)    Final Result      No significant interval change.      EC-ECHOCARDIOGRAM COMPLETE W/O CONT   Final Result      DX-CHEST-PORTABLE (1 VIEW)   Final Result      New dense right mid and lower lung zone consolidation is worrisome for pneumonia or aspiration      New moderate right pleural effusion      Likely underlying pulmonary edema and cardiac silhouette enlargement which is unchanged           Assessment/Plan  Acute hypoxemic respiratory failure (HCC)  Assessment & Plan  Combination of volume overload plus COPD plus possible pneumonia and pleural effusion  I am concerned about aspiration given the quality of his rhonchi  We will add Mucinex  Continue RT protocol  He has been dialyzed x2  Pulmonary to attempt a thoracentesis today  Add Mucinex to aid with secretions  May need swallow eval    Severe aortic stenosis- (present on admission)  Assessment & Plan  Diuresis to remove excess volume  Thoracentesis amount to be minimal    Pleural effusion, right  Assessment & Plan  Plan for thoracentesis    History of atrial flutter- (present on admission)  Assessment & Plan  Recently status post ablation  Resume warfarin after procedure    ESRD (end stage renal disease) on dialysis ([facility])- (present on admission)  Assessment & Plan  Next hemodialysis planned for Monday    Anemia in CKD (chronic kidney disease)- (present on admission)  Assessment & Plan  Hemoglobin stable    COPD (chronic obstructive pulmonary disease) ([facility])- (present on admission)  Assessment & Plan  With acute exacerbation         VTE prophylaxis: warfarin

## 2019-10-27 NOTE — DISCHARGE INSTRUCTIONS
Discharge Instructions per Lucia Woods M.D.  Follow up with Dialysis, wound clinic, warfarin clinic as scheduled  Follow up with primary care next week    DIET: Renal    ACTIVITY: as tolerated    DIAGNOSIS: Respiratory failure, pleural effusion, elevated INR, possible pneumonia    Return to ER if fever, chills, worsening shortness of breath        HF Patient Discharge Instructions  · Monitor your weight daily, and maintain a weight chart, to track your weight changes.   · Activity as tolerated, unless your Doctor has ordered otherwise. Other activity order: as tolerated.  · Follow a low fat, low cholesterol, low salt diet unless instructed otherwise by your Doctor. Read the labels on the back of food products and track your intake of fat, cholesterol and salt.   · Fluid Restriction No. If a Fluid Restriction has been ordered by your Doctor, measure fluids with a measuring cup to ensure that you are not exceeding the restriction.   · No smoking.  · Oxygen Yes. If your Doctor has ordered that you wear Oxygen at home, it is important to wear it as ordered.  · Did you receive an explanation from staff on the importance of taking each of your medications and why it is necessary to stay on the medications the physician/care provider has ordered? Yes  · Do you have any questions concerning how to manage your heart failure and what to do should you have any increased signs and symptoms after you go home? Yes  · Do you feel like your heart failure care team involved you in the care treatment plan and allowed you to make decisions regarding your care while in the hospital and addressed any discharge needs you might have? Yes    See the educational handout provided at discharge for more information on monitoring your daily weight, activity and diet. This also explains more about Heart Failure, symptoms of a flare-up and some of the tests that you have undergone.     Warning Signs of a Flare-Up include:  · Swelling  in the ankles or lower legs.  · Shortness of breath, while at rest, or while doing normal activities.   · Shortness of breath at night when in bed, or coughing in bed.   · Requiring more pillows to sleep at night, or needing to sit up at night to sleep.  · Feeling weak, dizzy or fatigued.     When to call your Doctor:  · Call [facility] about questions regarding the discharge instructions you were given [phone].  (Discharge Unit med tele)  · Call your Primary Care Physician or Cardiologist if:   1. You experience any pain radiating to your jaw or neck.  2. You have any difficulty breathing.  3. You experience weight gain of 2 lbs in a day or 5 lbs in a week.   4. You feel any palpitations or irregular heartbeats.  5. You become dizzy or lose consciousness.   If you have had an angiogram or had a pacemaker or AICD placed, and experience:  1. Bleeding, drainage or swelling at the surgical / puncture site.  2. Fever greater than 100.0 F  3. Shock from internal defibrillator.  4. Cool and / or numb extremities.      Thoracentesis, Care After  Refer to this sheet in the next few weeks. These instructions provide you with information about caring for yourself after your procedure. Your health care provider may also give you more specific instructions. Your treatment has been planned according to current medical practices, but problems sometimes occur. Call your health care provider if you have any problems or questions after your procedure.  WHAT TO EXPECT AFTER THE PROCEDURE  After your procedure, it is common to have pain at the puncture site.  HOME CARE INSTRUCTIONS  · Take medicines only as directed by your health care provider.  · You may return to your normal diet and normal activities as directed by your health care provider.  · Drink enough fluid to keep your urine clear or pale yellow.  · Do not take baths, swim, or use a hot tub until your health care provider approves.  · Follow  your health care provider's instructions about:  ¨ Puncture site care.  ¨ Bandage (dressing) changes and removal.  · Check your puncture site every day for signs of infection. Watch for:  ¨ Redness, swelling, or pain.  ¨ Fluid, blood, or pus.  · Keep all follow-up visits as directed by your health care provider. This is important.  SEEK MEDICAL CARE IF:  · You have redness, swelling, or pain at your puncture site.  · You have fluid, blood, or pus coming from your puncture site.  · You have a fever.  · You have chills.  · You have nausea or vomiting.  · You have trouble breathing.  · You develop a worsening cough.  SEEK IMMEDIATE MEDICAL CARE IF:  · You have extreme shortness of breath.  · You develop chest pain.  · You faint or feel light-headed.     This information is not intended to replace advice given to you by your health care provider. Make sure you discuss any questions you have with your health care provider.     Document Released: 01/08/2016 Document Reviewed: 01/08/2016  [employer] Interactive Patient Education ©2016 [employer]      Community-Acquired Pneumonia, Adult  Introduction  Pneumonia is an infection of the lungs. One type of pneumonia can happen while a person is in a hospital. A different type can happen when a person is not in a hospital (community-acquired pneumonia). It is easy for this kind to spread from person to person. It can spread to you if you breathe near an infected person who coughs or sneezes. Some symptoms include:  · A dry cough.  · A wet (productive) cough.  · Fever.  · Sweating.  · Chest pain.  Follow these instructions at home:  · Take over-the-counter and prescription medicines only as told by your doctor.  ¨ Only take cough medicine if you are losing sleep.  ¨ If you were prescribed an antibiotic medicine, take it as told by your doctor. Do not stop taking the antibiotic even if you start to feel better.  · Sleep with your head and neck raised (elevated). You can do this  by putting a few pillows under your head, or you can sleep in a recliner.  · Do not use tobacco products. These include cigarettes, chewing tobacco, and e-cigarettes. If you need help quitting, ask your doctor.  · Drink enough water to keep your pee (urine) clear or pale yellow.  A shot (vaccine) can help prevent pneumonia. Shots are often suggested for:  · People older than 65 years of age.  · People older than 19 years of age:  ¨ Who are having cancer treatment.  ¨ Who have long-term (chronic) lung disease.  ¨ Who have problems with their body's defense system (immune system).  You may also prevent pneumonia if you take these actions:  · Get the flu (influenza) shot every year.  · Go to the dentist as often as told.  · Wash your hands often. If soap and water are not available, use hand .  Contact a doctor if:  · You have a fever.  · You lose sleep because your cough medicine does not help.  Get help right away if:  · You are short of breath and it gets worse.  · You have more chest pain.  · Your sickness gets worse. This is very serious if:  ¨ You are an older adult.  ¨ Your body's defense system is weak.  · You cough up blood.  This information is not intended to replace advice given to you by your health care provider. Make sure you discuss any questions you have with your health care provider.  Document Released: 06/05/2009 Document Revised: 05/25/2017 Document Reviewed: 04/13/2016  © 2017 [employer]          Discharge Instructions    Discharged to home by car with relative. Discharged via wheelchair, hospital escort: Yes.  Special equipment needed: Wheelchair    Be sure to schedule a follow-up appointment with your primary care doctor or any specialists as instructed.     Discharge Plan:   Diet Plan: Discussed  Activity Level: Discussed  Confirmed Follow up Appointment: Patient to Call and Schedule Appointment  Confirmed Symptoms Management: Discussed  Medication Reconciliation Updated: Yes  Influenza  Vaccine Indication: Patient Refuses    I understand that a diet low in cholesterol, fat, and sodium is recommended for good health. Unless I have been given specific instructions below for another diet, I accept this instruction as my diet prescription.   Other diet: Renal    Special Instructions: None    · Is patient discharged on Warfarin / Coumadin?   Yes    You are receiving the drug warfarin. Please understand the importance of monitoring warfarin with scheduled PT/INR blood draws.  Follow-up with the Coumadin Clinic in one week for INR lab..    IMPORTANT: HOW TO USE THIS INFORMATION:  This is a summary and does NOT have all possible information about this product. This information does not assure that this product is safe, effective, or appropriate for you. This information is not individual medical advice and does not substitute for the advice of your health care professional. Always ask your health care professional for complete information about this product and your specific health needs.      WARFARIN - ORAL (WARF-uh-rin)      COMMON BRAND NAME(S): Coumadin      WARNING:  Warfarin can cause very serious (possibly fatal) bleeding. This is more likely to occur when you first start taking this medication or if you take too much warfarin. To decrease your risk for bleeding, your doctor or other health care provider will monitor you closely and check your lab results (INR test) to make sure you are not taking too much warfarin. Keep all medical and laboratory appointments. Tell your doctor right away if you notice any signs of serious bleeding. See also Side Effects section.      USES:  This medication is used to treat blood clots (such as in deep vein thrombosis-DVT or pulmonary embolus-PE) and/or to prevent new clots from forming in your body. Preventing harmful blood clots helps to reduce the risk of a stroke or heart attack. Conditions that increase your risk of developing blood clots include a certain  "type of irregular heart rhythm (atrial fibrillation), heart valve replacement, recent heart attack, and certain surgeries (such as hip/knee replacement). Warfarin is commonly called a \"blood thinner,\" but the more correct term is \"anticoagulant.\" It helps to keep blood flowing smoothly in your body by decreasing the amount of certain substances (clotting proteins) in your blood.      HOW TO USE:  Read the Medication Guide provided by your pharmacist before you start taking warfarin and each time you get a refill. If you have any questions, ask your doctor or pharmacist. Take this medication by mouth with or without food as directed by your doctor or other health care professional, usually once a day. It is very important to take it exactly as directed. Do not increase the dose, take it more frequently, or stop using it unless directed by your doctor. Dosage is based on your medical condition, laboratory tests (such as INR), and response to treatment. Your doctor or other health care provider will monitor you closely while you are taking this medication to determine the right dose for you. Use this medication regularly to get the most benefit from it. To help you remember, take it at the same time each day. It is important to eat a balanced, consistent diet while taking warfarin. Some foods can affect how warfarin works in your body and may affect your treatment and dose. Avoid sudden large increases or decreases in your intake of foods high in vitamin K (such as broccoli, cauliflower, cabbage, brussels sprouts, kale, spinach, and other green leafy vegetables, liver, green tea, certain vitamin supplements). If you are trying to lose weight, check with your doctor before you try to go on a diet. Cranberry products may also affect how your warfarin works. Limit the amount of cranberry juice (16 ounces/480 milliliters a day) or other cranberry products you may drink or eat.      SIDE EFFECTS:  Nausea, loss of appetite, " or stomach/abdominal pain may occur. If any of these effects persist or worsen, tell your doctor or pharmacist promptly. Remember that your doctor has prescribed this medication because he or she has judged that the benefit to you is greater than the risk of side effects. Many people using this medication do not have serious side effects. This medication can cause serious bleeding if it affects your blood clotting proteins too much (shown by unusually high INR lab results). Even if your doctor stops your medication, this risk of bleeding can continue for up to a week. Tell your doctor right away if you have any signs of serious bleeding, including: unusual pain/swelling/discomfort, unusual/easy bruising, prolonged bleeding from cuts or gums, persistent/frequent nosebleeds, unusually heavy/prolonged menstrual flow, pink/dark urine, coughing up blood, vomit that is bloody or looks like coffee grounds, severe headache, dizziness/fainting, unusual or persistent tiredness/weakness, bloody/black/tarry stools, chest pain, shortness of breath, difficulty swallowing. Tell your doctor right away if any of these unlikely but serious side effects occur: persistent nausea/vomiting, severe stomach/abdominal pain, yellowing eyes/skin. This drug rarely has caused very serious (possibly fatal) problems if its effects lead to small blood clots (usually at the beginning of treatment). This can lead to severe skin/tissue damage that may require surgery or amputation if left untreated. Patients with certain blood conditions (protein C or S deficiency) may be at greater risk. Get medical help right away if any of these rare but serious side effects occur: painful/red/purplish patches on the skin (such as on the toe, breast, abdomen), change in the amount of urine, vision changes, confusion, slurred speech, weakness on one side of the body. A very serious allergic reaction to this drug is rare. However, get medical help right away if you  notice any symptoms of a serious allergic reaction, including: rash, itching/swelling (especially of the face/tongue/throat), severe dizziness, trouble breathing. This is not a complete list of possible side effects. If you notice other effects not listed above, contact your doctor or pharmacist. In the [place] - Call your doctor for medical advice about side effects. You may report side effects to FDA at 0-502-AMA-5613. In [place] - Call your doctor for medical advice about side effects. You may report side effects to [facility] [place] at 1-983.509.1330.      PRECAUTIONS:  Before taking warfarin, tell your doctor or pharmacist if you are allergic to it; or if you have any other allergies. This product may contain inactive ingredients, which can cause allergic reactions or other problems. Talk to your pharmacist for more details. Before using this medication, tell your doctor or pharmacist your medical history, especially of: blood disorders (such as anemia, hemophilia), bleeding problems (such as bleeding of the stomach/intestines, bleeding in the brain), blood vessel disorders (such as aneurysms), recent major injury/surgery, liver disease, alcohol use, mental/mood disorders (including memory problems), frequent falls/injuries. It is important that all your doctors and dentists know that you take warfarin. Before having surgery or any medical/dental procedures, tell your doctor or dentist that you are taking this medication and about all the products you use (including prescription drugs, nonprescription drugs, and herbal products). Avoid getting injections into the muscles. If you must have an injection into a muscle (for example, a flu shot), it should be given in the arm. This way, it will be easier to check for bleeding and/or apply pressure bandages. This medication may cause stomach bleeding. Daily use of alcohol while using this medicine will increase your risk for stomach bleeding and may also affect how this  medication works. Limit or avoid alcoholic beverages. If you have not been eating well, if you have an illness or infection that causes fever, vomiting, or diarrhea for more than 2 days, or if you start using any antibiotic medications, contact your doctor or pharmacist immediately because these conditions can affect how warfarin works. This medication can cause heavy bleeding. To lower the chance of getting cut, bruised, or injured, use great caution with sharp objects like safety razors and nail cutters. Use an electric razor when shaving and a soft toothbrush when brushing your teeth. Avoid activities such as contact sports. If you fall or injure yourself, especially if you hit your head, call your doctor immediately. Your doctor may need to check you. The Food & Drug Administration has stated that generic warfarin products are interchangeable. However, consult your doctor or pharmacist before switching warfarin products. Be careful not to take more than one medication that contains warfarin unless specifically directed by the doctor or health care provider who is monitoring your warfarin treatment. Older adults may be at greater risk for bleeding while using this drug. This medication is not recommended for use during pregnancy because of serious (possibly fatal) harm to an unborn baby. Discuss the use of reliable forms of birth control with your doctor. If you become pregnant or think you may be pregnant, tell your doctor immediately. If you are planning pregnancy, discuss a plan for managing your condition with your doctor before you become pregnant. Your doctor may switch the type of medication you use during pregnancy. Very small amounts of this medication may pass into breast milk but is unlikely to harm a nursing infant. Consult your doctor before breast-feeding.      DRUG INTERACTIONS:  Drug interactions may change how your medications work or increase your risk for serious side effects. This document  "does not contain all possible drug interactions. Keep a list of all the products you use (including prescription/nonprescription drugs and herbal products) and share it with your doctor and pharmacist. Do not start, stop, or change the dosage of any medicines without your doctor's approval. Warfarin interacts with many prescription, nonprescription, vitamin, and herbal products. This includes medications that are applied to the skin or inside the vagina or rectum. The interactions with warfarin usually result in an increase or decrease in the \"blood-thinning\" (anticoagulant) effect. Your doctor or other health care professional should closely monitor you to prevent serious bleeding or clotting problems. While taking warfarin, it is very important to tell your doctor or pharmacist of any changes in medications, vitamins, or herbal products that you are taking. Some products that may interact with this drug include: capecitabine, imatinib, mifepristone. Aspirin, aspirin-like drugs (salicylates), and nonsteroidal anti-inflammatory drugs (NSAIDs such as ibuprofen, naproxen, celecoxib) may have effects similar to warfarin. These drugs may increase the risk of bleeding problems if taken during treatment with warfarin. Carefully check all prescription/nonprescription product labels (including drugs applied to the skin such as pain-relieving creams) since the products may contain NSAIDs or salicylates. Talk to your doctor about using a different medication (such as acetaminophen) to treat pain/fever. Low-dose aspirin and related drugs (such as clopidogrel, ticlopidine) should be continued if prescribed by your doctor for specific medical reasons such as heart attack or stroke prevention. Consult your doctor or pharmacist for more details. Many herbal products interact with warfarin. Tell your doctor before taking any herbal products, especially bromelains, coenzyme Q10, cranberry, danshen, dong quai, fenugreek, garlic, " ginkgo biloba, ginseng, and Adina's wort, among others. This medication may interfere with a certain laboratory test to measure theophylline levels, possibly causing false test results. Make sure laboratory personnel and all your doctors know you use this drug.      OVERDOSE:  If overdose is suspected, contact a poison control center or emergency room immediately. [place] residents can call the  National Poison Hotline at 1-353.447.5534. [place] residents can call a [facility] poison control center. Symptoms of overdose may include: bloody/black/tarry stools, pink/dark urine, unusual/prolonged bleeding.      NOTES:  Do not share this medication with others. Laboratory and/or medical tests (such as INR, complete blood count) must be performed periodically to monitor your progress or check for side effects. Consult your doctor for more details.      MISSED DOSE:  For the best possible benefit, do not miss any doses. If you do miss a dose and remember on the same day, take it as soon as you remember. If you remember on the next day, skip the missed dose and resume your usual dosing schedule. Do not double the dose to catch up because this could increase your risk for bleeding. Keep a record of missed doses to give to your doctor or pharmacist. Contact your doctor or pharmacist if you miss 2 or more doses in a row.      STORAGE:  Store at room temperature away from light and moisture. Do not store in the bathroom. Keep all medications away from children and pets. Do not flush medications down the toilet or pour them into a drain unless instructed to do so. Properly discard this product when it is  or no longer needed. Consult your pharmacist or local waste disposal company for more details about how to safely discard your product.      MEDICAL ALERT:  Your condition and medication can cause complications in a medical emergency. For information about enrolling in MedicAlert, call 1-235.147.2351 () or  3-249-163-5467 ([place]).      Information last revised October 2010 Copyright(c) 2010 [employer]             Depression / Suicide Risk    As you are discharged from this [facility] facility, it is important to learn how to keep safe from harming yourself.    Recognize the warning signs:  · Abrupt changes in personality, positive or negative- including increase in energy   · Giving away possessions  · Change in eating patterns- significant weight changes-  positive or negative  · Change in sleeping patterns- unable to sleep or sleeping all the time   · Unwillingness or inability to communicate  · Depression  · Unusual sadness, discouragement and loneliness  · Talk of wanting to die  · Neglect of personal appearance   · Rebelliousness- reckless behavior  · Withdrawal from people/activities they love  · Confusion- inability to concentrate     If you or a loved one observes any of these behaviors or has concerns about self-harm, here's what you can do:  · Talk about it- your feelings and reasons for harming yourself  · Remove any means that you might use to hurt yourself (examples: pills, rope, extension cords, firearm)  · Get professional help from the community (Mental Health, Substance Abuse, psychological counseling)  · Do not be alone:Call your Safe Contact- someone whom you trust who will be there for you.  · Call your local CRISIS HOTLINE 447-1040 or 932-306-5081  · Call your local Children's Mobile Crisis Response Team [place] (439) 398-5496 or www.[employer]thBridge  · Call the toll free National Suicide Prevention Hotlines   · National Suicide Prevention Lifeline 228-378-DOBW (6937)  · National Hope Line Network 800-SUICIDE (385-5779)

## 2019-10-27 NOTE — PROGRESS NOTES
Report received from ROCHELLE Vogel. Patient resting comfortably in bed. Declines any needs at this time. Call light in reach. Bed alarm on.

## 2019-10-27 NOTE — PROGRESS NOTES
Inpatient Anticoagulation Service Note    Date: 10/26/2019    Reason for Anticoagulation: Atrial Fibrillation   Target INR: 2.0 to 3.0  [id number] VASc Score: 6      Hemoglobin Value: (!) 8.9  Hematocrit Value: (!) 28.9    INR from last 7 days     Date/Time INR Value    10/26/19 1447  (!) 2.41    10/26/19 0348  (!) 3.06    10/25/19 0408  (!) 4.34    10/24/19 1330  (!) 3.06        Dose from last 7 days     Date/Time Dose (mg)    10/26/19 1700  0    10/25/19 1500  0    10/24/19 0858  0        Significant Interactions: Antibiotics, Proton Pump Inhibitor, Statin (If less than 5 days and overlap therapy discontinued -- document reason (i.e. Bleed Risk))    (If still on overlap therapy, if No -- document reason (i.e. Bleed Risk))    Comments: (Home regimen: Warfarin 2.5 mg M/F; 5 mg all other days)    Plan:  Hold warfarin due to planned thoracentesis tomorrow     Pharmacist suggested discharge dosing: resume home regimen     Jerzy Vasquez, PharmD

## 2019-10-27 NOTE — PROGRESS NOTES
Received bedside report from ROCHELLE Mora. Plan of care discussed. Safety precautions in place. Call light and personal belongings within reach. Patient has no needs at this time.

## 2019-10-27 NOTE — FLOWSHEET NOTE
10/27/19 1148   Events/Summary/Plan   Non-Invasive Resp Device Site Inspection Completed Intact   Interdisciplinary Plan of Care-Goals (Indications)   Bronchodilator Indications History / Diagnosis   Interdisciplinary Plan of Care-Outcomes    Bronchodilator Outcome Improved Patient Appearance with Decreased use of Accessory Muscles   Education   Education Yes - Pt. / Family has been Instructed in use of Respiratory Medications and Adverse Reactions   RT Assessment of Delivered Medications   Evaluation of Medication Delivery Daily Yes-- Pt /Family has been Instructed in use of Respiratory Medications and Adverse Reactions   SVN Group   #SVN Performed Yes   Given By: Mouthpiece   Date SVN Next Change Due (Q 7 Days) 10/31/19   Chest Exam   Work Of Breathing / Effort Mild   Respiration 20   Pulse 84   Breath Sounds   RUL Breath Sounds Clear;Diminished   RML Breath Sounds Fine Crackles;Diminished   RLL Breath Sounds Diminished   SILVANA Breath Sounds Clear;Diminished   LLL Breath Sounds Fine Crackles;Diminished   Secretions   Cough Dry   How Sputum Obtained Cough on Request   Oximetry   #Pulse Oximetry (Single Determination) Yes   Oxygen   Home O2 LPM Flow 2.5 LPM   Pulse Oximetry 96 %   O2 (LPM) 2.5   O2 Daily Delivery Respiratory  Silicone Nasal Cannula

## 2019-10-27 NOTE — ASSESSMENT & PLAN NOTE
Combination of volume overload plus COPD plus possible pneumonia and pleural effusion  I am concerned about aspiration given the quality of his rhonchi  We will add Mucinex  Continue RT protocol  He has been dialyzed x2  Pulmonary to attempt a thoracentesis today  Add Mucinex to aid with secretions  May need swallow eval

## 2019-10-27 NOTE — FLOWSHEET NOTE
10/27/19 0700   Events/Summary/Plan   Non-Invasive Resp Device Site Inspection Completed Intact   Interdisciplinary Plan of Care-Goals (Indications)   Bronchodilator Indications History / Diagnosis   Interdisciplinary Plan of Care-Outcomes    Bronchodilator Outcome Improved Patient Appearance with Decreased use of Accessory Muscles   Education   Education Yes - Pt. / Family has been Instructed in use of Respiratory Medications and Adverse Reactions   RT Assessment of Delivered Medications   Evaluation of Medication Delivery Daily Yes-- Pt /Family has been Instructed in use of Respiratory Medications and Adverse Reactions   SVN Group   #SVN Performed Yes   Given By: Mouthpiece   Date SVN Next Change Due (Q 7 Days) 10/31/19   Chest Exam   Work Of Breathing / Effort Moderate   Respiration 20   Pulse 68   Breath Sounds   RUL Breath Sounds Rhonchi   RML Breath Sounds Crackles   RLL Breath Sounds Diminished   SILVANA Breath Sounds Rhonchi   LLL Breath Sounds Diminished   Secretions   Cough Congested   How Sputum Obtained Cough on Request   Sputum Amount Moderate   Sputum Color Yellow   Oximetry   #Pulse Oximetry (Single Determination) Yes   Oxygen   Pulse Oximetry 94 %   O2 (LPM) 2.5

## 2019-10-27 NOTE — FLOWSHEET NOTE
10/26/19 1835   Interdisciplinary Plan of Care-Goals (Indications)   Bronchodilator Indications History / Diagnosis   Interdisciplinary Plan of Care-Outcomes    Bronchodilator Outcome Improved Patient Appearance with Decreased use of Accessory Muscles   Education   Education Yes - Pt. / Family has been Instructed in use of Respiratory Equipment;Yes - Pt. / Family has been Instructed in use of Respiratory Medications and Adverse Reactions   RT Assessment of Delivered Medications   Evaluation of Medication Delivery Daily Yes-- Pt /Family has been Instructed in use of Respiratory Medications and Adverse Reactions   SVN Group   #SVN Performed Yes   Given By: Mouthpiece   Respiratory WDL   Respiratory (WDL) WDL   Secretions   Cough Dry   Oximetry   #Pulse Oximetry (Single Determination) Yes   Continuous Oximetry Yes   O2 Alarms Set & Reviewed Yes   Oxygen   Home O2 Use Prior To Admission? Yes   Home O2 LPM Flow 2.5 LPM   Home O2 Delivery Method Nasal Cannula   Home O2 Frequency of Use As Needed for SOB   Pulse Oximetry 98 %   O2 (LPM) 2.5   O2 Daily Delivery Respiratory  Silicone Nasal Cannula

## 2019-10-27 NOTE — PROGRESS NOTES
Pulmonary Progress Note    Date of admission  10/24/2019    Chief Complaint  79 y.o. male admitted 10/24/2019 with Respiratory distress, missed dialysis session in the setting of a recent cardiac ablation    Hospital Course    Parmjit Castelan is a very pleasant 79-year-old male with a 40-pack-year smoking history quit 4 years ago who presented to the [facility] emergency room on 10/24/2019 for complaints of shortness of breath on the setting of a missed dialysis session. Patient was awoken from sleep early in the morning on the day of admission dyspnea and a nonproductive cough.  He denied fevers.  He had undergone a cardiac ablation for atrial flutter 1 day prior to admission and was discharged from the hospital the day prior.  He had missed his dialysis session while he was in the hospital and on arrival to the ED here at [facility] his oxygen saturations were in the low 80s.  He was unable to maintain oxygen saturations on supplemental oxygen was started on BiPAP and transferred to the ICU.      Interval Problem Update  Reviewed last 24 hour events:  Stable on 2 L nasal cannula, which is his baseline  Afebrile  No HD today  Platelets 152  INR 2.41    Review of Systems  Review of Systems   Constitutional: Negative for chills, fever and weight loss.   HENT: Negative for congestion, sinus pain and sore throat.    Eyes: Negative for pain and discharge.   Respiratory: Positive for cough and shortness of breath. Negative for sputum production, wheezing and stridor.    Cardiovascular: Negative for chest pain, orthopnea and leg swelling.   Gastrointestinal: Negative for abdominal pain, diarrhea, nausea and vomiting.   Genitourinary: Negative for dysuria, frequency and urgency.   Musculoskeletal: Negative for myalgias.   Skin: Negative for rash.   Neurological: Negative for dizziness, sensory change, focal weakness, loss of consciousness and headaches.   Psychiatric/Behavioral: Negative.    All other systems  reviewed and are negative.     Vital Signs for last 24 hours   Temp:  [36.4 °C (97.5 °F)-37 °C (98.6 °F)] 36.4 °C (97.6 °F)  Pulse:  [66-84] 66  Resp:  [18-22] 20  BP: (121-151)/(41-66) 136/41  SpO2:  [92 %-99 %] 94 %    Physical Exam   Physical Exam   Constitutional: He is oriented to person, place, and time. He appears well-developed and well-nourished. No distress.   Comfortable, sitting in chair at side of bed  Breathing comfortably on 2LNC, not tachypnic, although sats in high 80s in no distress   HENT:   Mouth/Throat: Oropharynx is clear and moist. No oropharyngeal exudate.   Eyes: Conjunctivae are normal. Right eye exhibits no discharge. Left eye exhibits no discharge. No scleral icterus.   Neck: Normal range of motion. No JVD present. No tracheal deviation present. No thyromegaly present.   Cardiovascular: Regular rhythm and normal heart sounds. Exam reveals no friction rub.   No murmur heard.  Pulmonary/Chest: No stridor. No respiratory distress. He has no wheezes. He has rales.   Improving breath sounds right lung base  Tunneled hemodialysis line in right chest, insertion site appears clean dry and intact   Abdominal: Soft. Bowel sounds are normal. He exhibits no distension. There is no tenderness. There is no rebound.   Musculoskeletal: Normal range of motion. He exhibits no edema.   Lymphadenopathy:     He has no cervical adenopathy.   Neurological: He is alert and oriented to person, place, and time.   Skin: Skin is warm. No rash noted. No erythema. No pallor.   Toe ulcer noted  Maturing AVF right arm   Nursing note and vitals reviewed.    Medications  Current Facility-Administered Medications   Medication Dose Route Frequency Provider Last Rate Last Dose   • guaiFENesin dextromethorphan (ROBITUSSIN DM) 100-10 MG/5ML syrup 5 mL  5 mL Oral Q6HRS PRN Lucy Beyer M.D.       • MD Alert...Warfarin per Pharmacy   Other PHARMACY TO DOSE Lucia Woods M.D.       • warfarin (COUMADIN) tablet  5 mg  5 mg Oral DAILY AT 1800 Lucia Woods M.D.       • guaiFENesin ER (MUCINEX) tablet 1,200 mg  1,200 mg Oral Q12HRS Lucia Woods M.D.   1,200 mg at 10/27/19 0555   • albumin human 25% solution 12.5 g  12.5 g Intravenous DIALYSIS PRN Fadi Najjar, M.D. 150 mL/hr at 10/25/19 0718 12.5 g at 10/25/19 0718   • calcium acetate (PHOS-LO) capsule 1,334 mg  1,334 mg Oral TID AC Eagle Fisher M.D.   1,334 mg at 10/27/19 1204   • levalbuterol (XOPENEX) 1.25 MG/3ML nebulizer solution 1.25 mg  1.25 mg Nebulization 4X/DAY (RT) Eagle Fisher M.D.   1.25 mg at 10/27/19 1148   • Respiratory Care per Protocol   Nebulization Continuous RT Christina Aviles M.D.       • levalbuterol (XOPENEX) 1.25 MG/3ML nebulizer solution 1.25 mg  1.25 mg Nebulization Q2HRS PRN (RT) Christina Aviles M.D.   1.25 mg at 10/24/19 1047   • Respiratory Care per Protocol   Nebulization Continuous RT Eagle Fisher M.D.       • cefTRIAXone (ROCEPHIN) 2 g in  mL IVPB  2 g Intravenous Q24HRS Eagle Fisher M.D.   Stopped at 10/27/19 0628   • doxycycline monohydrate (ADOXA) tablet 100 mg  100 mg Oral Q12HRS Eagle Fisher M.D.   100 mg at 10/27/19 0554   • senna-docusate (PERICOLACE or SENOKOT S) 8.6-50 MG per tablet 2 Tab  2 Tab Oral BID Eagle Fisher M.D.        And   • polyethylene glycol/lytes (MIRALAX) PACKET 1 Packet  1 Packet Oral QDAY PRN Eagle Fisher M.D.        And   • magnesium hydroxide (MILK OF MAGNESIA) suspension 30 mL  30 mL Oral QDAY PRN Eagle Fisher M.D.        And   • bisacodyl (DULCOLAX) suppository 10 mg  10 mg Rectal QDAY PRN Eagle Fisher M.D.       • Respiratory Care per Protocol   Nebulization Continuous RT Eagle Fisher M.D.       • metoprolol (LOPRESSOR) tablet 25 mg  25 mg Oral BID Eagle Fisher M.D.   25 mg at 10/27/19 0555   • gabapentin (NEURONTIN) capsule 300 mg  300 mg Oral TID Eagle Fisher M.D.   300 mg at 10/27/19 1204   • omeprazole (PRILOSEC)  capsule 40 mg  40 mg Oral QAM Eagle Fisher M.D.   40 mg at 10/27/19 0554   • pravastatin (PRAVACHOL) tablet 20 mg  20 mg Oral Nightly Eagle Fisher M.D.   20 mg at 10/26/19 2045   • ROPINIRole (REQUIP) tablet 1 mg  1 mg Oral BID Eagle Fisher M.D.   1 mg at 10/27/19 0555   • tamsulosin (FLOMAX) capsule 0.4 mg  0.4 mg Oral AFTER BREAKFAST Eagle Fisher M.D.   0.4 mg at 10/27/19 1014   • heparin injection 3,800 Units  3,800 Units Intracatheter DIALYSIS PRN Fadi Najjar, M.D.   3,800 Units at 10/25/19 0835   • heparin injection 2,000 Units  2,000 Units Intravenous DIALYSIS PRN Fadi Najjar, M.D.   2,000 Units at 10/25/19 0530     Fluids    Intake/Output Summary (Last 24 hours) at 10/27/2019 1341  Last data filed at 10/27/2019 0700  Gross per 24 hour   Intake 100 ml   Output --   Net 100 ml     Laboratory          Recent Labs     10/25/19  0408 10/26/19  0348 10/27/19  0324   SODIUM 136 139 139   POTASSIUM 4.7 4.4 4.8   CHLORIDE 96 99 98   CO2 23 25 24   BUN 34* 37* 54*   CREATININE 5.77* 4.79* 6.41*   CALCIUM 9.4 10.1 10.6*     Recent Labs     10/25/19  0408 10/26/19  0348 10/27/19  0324   ALTSGPT 9 10 10   ASTSGOT 16 18 16   ALKPHOSPHAT 119* 105* 114*   TBILIRUBIN 0.6 0.6 0.5   GLUCOSE 82 86 94     Recent Labs     10/25/19  0408 10/26/19  0348 10/26/19  1755 10/27/19  0324   WBC 9.1 5.4  --  5.3   NEUTSPOLYS 90.40* 81.60*  --  78.80*   LYMPHOCYTES 3.70* 6.30*  --  9.00*   MONOCYTES 4.40 7.60  --  5.80   EOSINOPHILS 1.00 3.50 2 5.60   BASOPHILS 0.20 0.40  --  0.20   ASTSGOT 16 18  --  16   ALTSGPT 9 10  --  10   ALKPHOSPHAT 119* 105*  --  114*   TBILIRUBIN 0.6 0.6  --  0.5     Recent Labs     10/25/19  0408 10/26/19  0348 10/26/19  1447 10/27/19  0324   RBC 3.06* 2.93*  --  2.99*   HEMOGLOBIN 9.5* 8.9*  --  8.9*   HEMATOCRIT 30.4* 28.9*  --  29.4*   PLATELETCT 160* 152*  --  157*   PROTHROMBTM 43.0* 32.5* 26.9* 24.7*   INR 4.34* 3.06* 2.41* 2.17*     Imaging  X-Ray:  I have personally reviewed the images  and compared with prior images.  Chest x-ray personally reviewed, showing persistent right pleural effusion, previously absent in 8/2018    Blood cultures 10/24 NGTD  Sputum cultures showing moderate growth of usual upper respiratory em  Influenza swab negative   Procalcitonin up trended to 12.6, however clinically improving  Lactic acid 2.5 -> 1.4  proBNP level still > 35,000  Troponin 1083     Bedside ultrasound confirms persistent large right pleural effusion     TTE showing LVEF 60%, grade 2 diastolic dysfunction, severely dilated left atrium, and severe aortic stenosis, estimated RVSP 50 mmHg    Assessment/Plan    Acute hypoxemic respiratory failure (HCC)  Assessment & Plan  Due to missed dialysis session resulting in hypervolemia/pulmonary edema, compounded by pleural effusion and possible aspiration pneumonia  Continue ceftriaxone, doxycycline  Hemodialysis per nephrology  Cont supplemental O2 maintain SPO2 greater than 92%  -- s/p thoracentesis 10/26; transudative; 1050cc    Severe aortic stenosis- (present on admission)  Assessment & Plan  Noted on TTE on 10/24  HD stable currently  Continue to monitor, consider cardiology consult if persistent resp failure despite HD    HELAG (obstructive sleep apnea)- (present on admission)  Assessment & Plan  Continue BiPAP nightly    Pleural effusion, right  Assessment & Plan  Associated with right lower lobe infiltrate, appears free-flowing and simple on ultrasound  Plan for HD today, continue antibiotics, reassess tomorrow  -- diagnostic/therapeutic thoracentesis 10/26- 1050cc, transudative    History of atrial flutter- (present on admission)  Assessment & Plan  Status post ablation on 10/23/2019  Currently in sinus rhythm  Discussed with EP cardiology  Cont coumadin per pharmacy, goal INR 2-3     Follow-up appointment arranged in pulmonary clinic  Ok to discharge from pulmonary perspective    VTE:  Coumadin  Ulcer: Not Indicated  Lines: Central Line  Ongoing  indication addressed- tunneled HD line    I have performed a physical exam and reviewed and updated ROS and Plan today (10/27/2019). In review of yesterday's note (10/26/2019), there are no changes except as documented above.     Discussed patient condition and risk of morbidity and/or mortality with RN, RT, Charge nurse / hot rounds and Patient     This note was generated using voice recognition software which has a chance of producing errors of grammar and content.  I have made every reasonable attempt to find and correct any errors, but it should be expected that some may not be found prior to finalization of this note.  __________  Eagle Fisher MD  Pulmonary and Critical Care Medicine  [facility]

## 2019-10-27 NOTE — PROGRESS NOTES
Telemetry Shift Summary    Rhythm SR 1st Degree w/ BBB  HR Range 71-73  Ectopy rPVC  Measurements 0.24/0.14/0.42        Normal Values  Rhythm SR  HR Range    Measurements 0.12-0.20 / 0.06-0.10  / 0.30-0.52

## 2019-10-27 NOTE — WOUND TEAM
Wound team in to see pt for right great toe wound and right knee.  Great toe had dry black eschar.  No signs of infection.  Will keep area SHAYLA.  Right knee with dry superficial appearing scab also left SHAYLA, scab will slough on its own over time.  Pt states right lateral heel hurts when he walks.  Small area of dry tissue noted but no open wound.  Pt states he has had vascular procedure with Dr Peters.  Encouraged pt to make follow up appointment with Dr Peters.  Will contact Dr Peters re this pt;s hospital admision.

## 2019-10-27 NOTE — CARE PLAN
Problem: Safety  Goal: Will remain free from injury  Outcome: PROGRESSING AS EXPECTED  Note:   Remind patient to use call light and provide assistance. Bed in low position, bed locked, and appropriate alarms set. Patient wearing non-slip socks. Call light and personal belongings are within reach.

## 2019-10-27 NOTE — PROGRESS NOTES
Nephrology Daily Progress Note    Date of Service  10/27/2019    Chief Complaint  79 y.o. male admitted 10/24/2019 with ESRD, SOB    Interval Problem Update  Pt feels better after thoracentesis  Anxious to go home     Review of Systems  Review of Systems   Constitutional: Positive for malaise/fatigue. Negative for chills and fever.   Respiratory: Negative for cough and shortness of breath.    Cardiovascular: Negative for chest pain and leg swelling.   Gastrointestinal: Negative for nausea and vomiting.   Genitourinary: Negative for dysuria, frequency and urgency.        Physical Exam  Temp:  [36.4 °C (97.5 °F)-37 °C (98.6 °F)] 36.4 °C (97.6 °F)  Pulse:  [66-84] 66  Resp:  [18-22] 20  BP: (121-151)/(41-66) 136/41  SpO2:  [92 %-99 %] 94 %    Physical Exam   Constitutional: He is oriented to person, place, and time. No distress.   HENT:   Mouth/Throat: No oropharyngeal exudate.   Eyes: No scleral icterus.   Cardiovascular: Normal rate and regular rhythm.   No murmur heard.  Pulmonary/Chest: Effort normal and breath sounds normal. No respiratory distress. He has no wheezes.   Musculoskeletal: He exhibits no edema or deformity.   Neurological: He is alert and oriented to person, place, and time.   Skin: Skin is warm. He is not diaphoretic. No erythema.   Psychiatric: He has a normal mood and affect. His behavior is normal.   Nursing note and vitals reviewed.      Fluids    Intake/Output Summary (Last 24 hours) at 10/27/2019 1430  Last data filed at 10/27/2019 1300  Gross per 24 hour   Intake 220 ml   Output --   Net 220 ml       Laboratory  Recent Labs     10/25/19  0408 10/26/19  0348 10/27/19  0324   WBC 9.1 5.4 5.3   RBC 3.06* 2.93* 2.99*   HEMOGLOBIN 9.5* 8.9* 8.9*   HEMATOCRIT 30.4* 28.9* 29.4*   MCV 99.3* 98.6* 98.3*   MCH 31.0 30.4 29.8   MCHC 31.3* 30.8* 30.3*   RDW 60.0* 61.1* 59.3*   PLATELETCT 160* 152* 157*   MPV 10.6 10.3 10.4     Recent Labs     10/25/19  0408 10/26/19  0348 10/27/19  0324   SODIUM 136 139  139   POTASSIUM 4.7 4.4 4.8   CHLORIDE 96 99 98   CO2 23 25 24   GLUCOSE 82 86 94   BUN 34* 37* 54*   CREATININE 5.77* 4.79* 6.41*   CALCIUM 9.4 10.1 10.6*     Recent Labs     10/26/19  0348 10/26/19  1447 10/27/19  0324   INR 3.06* 2.41* 2.17*     Recent Labs     10/25/19  0408   NTPROBNP >86514*           Imaging  DX-CHEST-PORTABLE (1 VIEW)   Final Result      Stable bibasilar opacities, right greater than left.      DX-CHEST-LIMITED (1 VIEW)   Final Result      1.  Interval decrease in right pleural effusion following thoracentesis. No pneumothorax is identified.      2.  Residual opacification remains in the right base.      3.  Left basilar atelectasis has increased.      DX-CHEST-PORTABLE (1 VIEW)   Final Result      Stable chest appearance compared with 10/25.      DX-CHEST-PORTABLE (1 VIEW)   Final Result      No significant interval change.      EC-ECHOCARDIOGRAM COMPLETE W/O CONT   Final Result      DX-CHEST-PORTABLE (1 VIEW)   Final Result      New dense right mid and lower lung zone consolidation is worrisome for pneumonia or aspiration      New moderate right pleural effusion      Likely underlying pulmonary edema and cardiac silhouette enlargement which is unchanged            Assessment/Plan  1 ESRD  2 resp failure  3 pleural effusion  4 Anemia  Plan  no need for HD  Renal diet  Daily labs  Renal dose all meds  Avoid nephrotoxins  Prognosis guarded.

## 2019-10-27 NOTE — PROGRESS NOTES
Pulmonary Progress Note    Date of admission  10/24/2019    Chief Complaint  79 y.o. male admitted 10/24/2019 with Respiratory distress, missed dialysis session in the setting of a recent cardiac ablation    Hospital Course    Parmjit Castelan is a very pleasant 79-year-old male with a 40-pack-year smoking history quit 4 years ago who presented to the [facility] emergency room on 10/24/2019 for complaints of shortness of breath on the setting of a missed dialysis session. Patient was awoken from sleep early in the morning on the day of admission dyspnea and a nonproductive cough.  He denied fevers.  He had undergone a cardiac ablation for atrial flutter 1 day prior to admission and was discharged from the hospital the day prior.  He had missed his dialysis session while he was in the hospital and on arrival to the ED here at [facility] his oxygen saturations were in the low 80s.  He was unable to maintain oxygen saturations on supplemental oxygen was started on BiPAP and transferred to the ICU.      Interval Problem Update  Reviewed last 24 hour events:  Stable on 2 L nasal cannula, which is his baseline  Afebrile  No HD today  Platelets 152  INR 2.41    Review of Systems  Review of Systems   Constitutional: Negative for chills, fever and weight loss.   HENT: Negative for congestion, sinus pain and sore throat.    Eyes: Negative for pain and discharge.   Respiratory: Positive for cough and shortness of breath. Negative for sputum production, wheezing and stridor.    Cardiovascular: Negative for chest pain, orthopnea and leg swelling.   Gastrointestinal: Negative for abdominal pain, diarrhea, nausea and vomiting.   Genitourinary: Negative for dysuria, frequency and urgency.   Musculoskeletal: Negative for myalgias.   Skin: Negative for rash.   Neurological: Negative for dizziness, sensory change, focal weakness, loss of consciousness and headaches.   Psychiatric/Behavioral: Negative.    All other systems  reviewed and are negative.     Vital Signs for last 24 hours   Temp:  [36.3 °C (97.3 °F)-37.1 °C (98.7 °F)] 36.8 °C (98.2 °F)  Pulse:  [72-83] 74  Resp:  [18-24] 22  BP: (125-144)/(50-60) 142/50  SpO2:  [95 %-100 %] 98 %    Physical Exam   Physical Exam   Constitutional: He is oriented to person, place, and time. He appears well-developed and well-nourished. No distress.   Comfortable, sitting in chair at side of bed  Breathing comfortably on 2LNC, not tachypnic, although sats in high 80s in no distress   HENT:   Mouth/Throat: Oropharynx is clear and moist. No oropharyngeal exudate.   Eyes: Conjunctivae are normal. Right eye exhibits no discharge. Left eye exhibits no discharge. No scleral icterus.   Neck: Normal range of motion. No JVD present. No tracheal deviation present. No thyromegaly present.   Cardiovascular: Regular rhythm and normal heart sounds. Exam reveals no friction rub.   No murmur heard.  Pulmonary/Chest: No stridor. No respiratory distress. He has no wheezes. He has rales.   Decreased breath sounds right lung base    Tunneled hemodialysis line in right chest, insertion site appears clean dry and intact   Abdominal: Soft. Bowel sounds are normal. He exhibits no distension. There is no tenderness. There is no rebound.   Musculoskeletal: Normal range of motion. He exhibits no edema.   Lymphadenopathy:     He has no cervical adenopathy.   Neurological: He is alert and oriented to person, place, and time.   Skin: Skin is warm. No rash noted. No erythema. No pallor.   Toe ulcer noted  Maturing AVF right arm   Nursing note and vitals reviewed.    Medications  Current Facility-Administered Medications   Medication Dose Route Frequency Provider Last Rate Last Dose   • guaiFENesin ER (MUCINEX) tablet 1,200 mg  1,200 mg Oral Q12HRS Lucia Woods M.D.   1,200 mg at 10/26/19 1821   • albumin human 25% solution 12.5 g  12.5 g Intravenous DIALYSIS PRN Fadi Najjar, M.D. 150 mL/hr at 10/25/19 0718 12.5 g at  10/25/19 0718   • calcium acetate (PHOS-LO) capsule 1,334 mg  1,334 mg Oral TID AC Eagle Fisher M.D.   1,334 mg at 10/26/19 1820   • levalbuterol (XOPENEX) 1.25 MG/3ML nebulizer solution 1.25 mg  1.25 mg Nebulization 4X/DAY (RT) Eagle Fisher M.D.   1.25 mg at 10/26/19 1422   • Respiratory Care per Protocol   Nebulization Continuous RT Christina Aviles M.D.       • levalbuterol (XOPENEX) 1.25 MG/3ML nebulizer solution 1.25 mg  1.25 mg Nebulization Q2HRS PRN (RT) Christina Aviles M.D.   1.25 mg at 10/24/19 1047   • Respiratory Care per Protocol   Nebulization Continuous RT Eagle Fisher M.D.       • cefTRIAXone (ROCEPHIN) 2 g in  mL IVPB  2 g Intravenous Q24HRS Eagle Fisher M.D.   Stopped at 10/26/19 0635   • doxycycline monohydrate (ADOXA) tablet 100 mg  100 mg Oral Q12HRS Eagle Fisher M.D.   100 mg at 10/26/19 1821   • senna-docusate (PERICOLACE or SENOKOT S) 8.6-50 MG per tablet 2 Tab  2 Tab Oral BID Eagle Fisher M.D.        And   • polyethylene glycol/lytes (MIRALAX) PACKET 1 Packet  1 Packet Oral QDAY PRN Eagle Fisher M.D.        And   • magnesium hydroxide (MILK OF MAGNESIA) suspension 30 mL  30 mL Oral QDAY PRN Eagle Fisher M.D.        And   • bisacodyl (DULCOLAX) suppository 10 mg  10 mg Rectal QDAY PRN Eagle Fisher M.D.       • Respiratory Care per Protocol   Nebulization Continuous RT Eagle Fisher M.D.       • metoprolol (LOPRESSOR) tablet 25 mg  25 mg Oral BID Eagle Fisher M.D.   25 mg at 10/26/19 1821   • gabapentin (NEURONTIN) capsule 300 mg  300 mg Oral TID Eagle Fisher M.D.   300 mg at 10/26/19 1821   • omeprazole (PRILOSEC) capsule 40 mg  40 mg Oral QAM Eagle Fisher M.D.   40 mg at 10/26/19 0605   • pravastatin (PRAVACHOL) tablet 20 mg  20 mg Oral Nightly Eagle Fisher M.D.   20 mg at 10/25/19 2005   • ROPINIRole (REQUIP) tablet 1 mg  1 mg Oral BID Eagle Fisher M.D.   1 mg at 10/26/19 1821   • tamsulosin (FLOMAX)  capsule 0.4 mg  0.4 mg Oral AFTER BREAKFAST Eagle Fisher M.D.   0.4 mg at 10/26/19 0759   • MD Alert...Warfarin per Pharmacy   Other PHARMACY TO DOSE Eagle Fisher M.D.       • heparin injection 3,800 Units  3,800 Units Intracatheter DIALYSIS PRN Fadi Najjar, M.D.   3,800 Units at 10/25/19 0835   • heparin injection 2,000 Units  2,000 Units Intravenous DIALYSIS PRN Fadi Najjar, M.D.   2,000 Units at 10/25/19 0530     Fluids  No intake or output data in the 24 hours ending 10/26/19 1830    Laboratory          Recent Labs     10/24/19  0858 10/25/19  0408 10/26/19  0348   SODIUM 137 136 139   POTASSIUM 4.6 4.7 4.4   CHLORIDE 92* 96 99   CO2 23 23 25   BUN 55* 34* 37*   CREATININE 8.13* 5.77* 4.79*   CALCIUM 10.4* 9.4 10.1     Recent Labs     10/24/19  0858 10/25/19  0408 10/26/19  0348   ALTSGPT 10 9 10   ASTSGOT 18 16 18   ALKPHOSPHAT 159* 119* 105*   TBILIRUBIN 0.8 0.6 0.6   GLUCOSE 109* 82 86     Recent Labs     10/24/19  0858 10/25/19  0408 10/26/19  0348   WBC 8.2 9.1 5.4   NEUTSPOLYS 89.80* 90.40* 81.60*   LYMPHOCYTES 4.60* 3.70* 6.30*   MONOCYTES 4.40 4.40 7.60   EOSINOPHILS 0.40 1.00 3.50   BASOPHILS 0.40 0.20 0.40   ASTSGOT 18 16 18   ALTSGPT 10 9 10   ALKPHOSPHAT 159* 119* 105*   TBILIRUBIN 0.8 0.6 0.6     Recent Labs     10/24/19  0858  10/25/19  0408 10/26/19  0348 10/26/19  1447   RBC 3.77*  --  3.06* 2.93*  --    HEMOGLOBIN 11.1*  --  9.5* 8.9*  --    HEMATOCRIT 37.4*  --  30.4* 28.9*  --    PLATELETCT 213  --  160* 152*  --    PROTHROMBTM  --    < > 43.0* 32.5* 26.9*   INR  --    < > 4.34* 3.06* 2.41*    < > = values in this interval not displayed.     Imaging  X-Ray:  I have personally reviewed the images and compared with prior images.  Chest x-ray personally reviewed, showing persistent right pleural effusion, previously absent in 8/2018    Blood cultures 10/24 NGTD  Sputum cultures showing moderate growth of usual upper respiratory em  Influenza swab negative   Procalcitonin up trended  to 12.6, however clinically improving  Lactic acid 2.5 -> 1.4  proBNP level still > 35,000  Troponin 1083     Bedside ultrasound confirms persistent large right pleural effusion     TTE showing LVEF 60%, grade 2 diastolic dysfunction, severely dilated left atrium, and severe aortic stenosis, estimated RVSP 50 mmHg    Assessment/Plan    Acute hypoxemic respiratory failure (HCC)  Assessment & Plan  Due to missed dialysis session resulting in hypervolemia/pulmonary edema, compounded by pleural effusion and possible aspiration pneumonia  Continue ceftriaxone, doxycycline  Hemodialysis per nephrology  Cont supplemental O2 maintain SPO2 greater than 92%  Discussed risks/benefits/alternatives regarding right-sided therapeutic thoracentesis with patient all questions answered, drained 1050cc clear/yellow fluid consisent with transudate  Followup pleural fluid studies    Severe aortic stenosis- (present on admission)  Assessment & Plan  Noted on TTE on 10/24  HD stable currently  Continue to monitor, consider cardiology consult if persistent resp failure despite HD    HELGA (obstructive sleep apnea)- (present on admission)  Assessment & Plan  Continue BiPAP nightly    Pleural effusion, right  Assessment & Plan  Associated with right lower lobe infiltrate, appears free-flowing and simple on ultrasound  Plan for HD today, continue antibiotics, reassess tomorrow  Diagnostic/therapeutic thoracentesis 10/26 - 1050cc clear/yellow  F/u pleural fluid studies    History of atrial flutter- (present on admission)  Assessment & Plan  Status post ablation on 10/23/2019  Currently in sinus rhythm  Discussed with EP cardiology  Cont coumadin per pharmacy, goal INR 2-3     VTE:  Coumadin  Ulcer: Not Indicated  Lines: Central Line  Ongoing indication addressed- tunneled HD line    I have performed a physical exam and reviewed and updated ROS and Plan today (10/26/2019). In review of yesterday's note (10/25/2019), there are no changes except as  documented above.     Discussed patient condition and risk of morbidity and/or mortality with RN, RT, Charge nurse / hot rounds and Patient     This note was generated using voice recognition software which has a chance of producing errors of grammar and content.  I have made every reasonable attempt to find and correct any errors, but it should be expected that some may not be found prior to finalization of this note.  __________  Eagle Fisher MD  Pulmonary and Critical Care Medicine  [facility]

## 2019-10-27 NOTE — PROGRESS NOTES
Inpatient Anticoagulation Service Note    Date: 10/27/2019    Reason for Anticoagulation: Atrial Fibrillation   Target INR: 2.0 to 3.0  [id number] VASc Score: 6      Hemoglobin Value: (!) 8.9  Hematocrit Value: (!) 29.4    INR from last 7 days     Date/Time INR Value    10/27/19 0324  (!) 2.17    10/26/19 1447  (!) 2.41    10/26/19 0348  (!) 3.06    10/25/19 0408  (!) 4.34    10/24/19 1330  (!) 3.06        Dose from last 7 days     Date/Time Dose (mg)    10/27/19 1100  5    10/26/19 1700  0    10/25/19 1500  0    10/24/19 0858  0        Significant Interactions: Antibiotics, Proton Pump Inhibitor, Statin (If less than 5 days and overlap therapy discontinued -- document reason (i.e. Bleed Risk))    (If still on overlap therapy, if No -- document reason (i.e. Bleed Risk))    Comments: (Home regimen: Warfarin 2.5 mg M/F; 5 mg all other days)    Plan:  Warfarin 5 mg PO today     Pharmacist suggested discharge dosing: resume home regimen     Jerzy Vasquez, PharmD

## 2019-10-28 NOTE — DISCHARGE SUMMARY
Discharge Summary    CHIEF COMPLAINT ON ADMISSION  Chief Complaint   Patient presents with   • Shortness of Breath       Reason for Admission  Hard to Breath      Admission Date  10/24/2019    CODE STATUS  Prior    HPI & HOSPITAL COURSE  *79-year-old male with history of end-stage renal disease on dialysis, atrial flutter, aortic stenosis, anticoagulated on warfarin recently underwent ablation.  On that day he missed hemodialysis, he presented with respiratory failure, volume overload and initially required BiPAP.  He underwent to hemodialysis and is back to his baseline of 2 to 2-1/2 L.  However he has significant rhonchi and procalcitonin is rising. *       Patient underwent thoracentesis on 10/26, the following morning his rhonchi were significantly improved.  Given the quality of the rhonchi I asked the patient and spouse about aspiration events, he has had occasional events when he is eating too fast over the last few weeks but none in the last few days.    Following thoracentesis and improvement of the rhonchi he felt able to go home, he will follow-up with hemodialysis tomorrow    Therefore, he is discharged in good and stable condition to home with close outpatient follow-up.    The patient met 2-midnight criteria for an inpatient stay at the time of discharge.    Discharge Date  10/27/2019    FOLLOW UP ITEMS POST DISCHARGE  PCP  Vascular Surgery  HD  Wound clinic  Cardiology  Anticoag clinic    DISCHARGE DIAGNOSES  Active Problems:    Acute hypoxemic respiratory failure (HCC) POA: Unknown    HELGA (obstructive sleep apnea) POA: Yes    Severe aortic stenosis POA: Yes    Pleural effusion, right POA: Unknown    COPD (chronic obstructive pulmonary disease) ([facility]) POA: Yes    Anemia in CKD (chronic kidney disease) POA: Yes    ESRD (end stage renal disease) on dialysis ([facility]) POA: Yes    History of atrial flutter POA: Yes  Resolved Problems:    * No resolved hospital problems. *      FOLLOW UP  Future Appointments    Date Time Provider Department Center   10/29/2019 11:00 AM ILAN Wiggins [facility]   10/29/2019  1:30 PM [facility] .   11/5/2019 11:00 AM ILAN Naylor [facility].   11/5/2019  2:45 PM James Mendoza M.D. [facility] None   11/14/2019  1:20 PM ILAN Jiang [facility] None   11/14/2019  3:45 PM Eagle Fisher M.D. PULM None     Martha Light M.D.  [address] [facility] [address]  [facility] [address]  [phone]    Call        MEDICATIONS ON DISCHARGE     Medication List      START taking these medications      Instructions   cefdinir 300 MG Caps  Commonly known as:  OMNICEF   Take 1 Cap by mouth 2 times a day.  Dose:  300 mg     doxycycline 100 MG capsule  Commonly known as:  MONODOX   Take 1 Cap by mouth 2 times a day.  Dose:  100 mg     Guaifenesin 1200 MG Tb12   Take 1 Tab by mouth every 12 hours.  Dose:  1,200 mg     warfarin 5 MG Tabs  Commonly known as:  COUMADIN   1.5 tablets  Tonight = 7.5 mg then 1 tab 5mg every evening        CONTINUE taking these medications      Instructions   albuterol 108 (90 Base) MCG/ACT Aers inhalation aerosol   Inhale 2 Puffs by mouth every four hours as needed.  Dose:  2 Puff     calcium acetate 667 MG Caps  Commonly known as:  PHOS-LO   Take 1,334 mg by mouth 3 times a day, with meals.  Dose:  1,334 mg     gabapentin 300 MG Caps  Commonly known as:  NEURONTIN  Notes to patient:  stopped   Take 1 Cap by mouth 3 times a day.  Dose:  1 Cap     levalbuterol 1.25 MG/3ML Nebu  Commonly known as:  XOPENEX   Doctor's comments:  COPD J44.9  3 mL by Nebulization route every four hours as needed for Shortness of Breath.  Dose:  1.25 mg     metoprolol 50 MG Tabs  Commonly known as:  LOPRESSOR   Take 0.5 Tabs by mouth 2 Times a Day.  Dose:  25 mg     omeprazole 40 MG delayed-release capsule  Commonly known as:  PRILOSEC   Take 40 mg by mouth every morning.  Dose:  40 mg     pravastatin 20 MG Tabs  Commonly known as:  PRAVACHOL    Take 20 mg by mouth every evening.  Dose:  20 mg     ROPINIRole 0.5 MG Tabs  Commonly known as:  REQUIP   TAKE TWO TABLETS BY MOUTH DAILY     tamsulosin 0.4 MG capsule  Commonly known as:  FLOMAX   Take 1 Cap by mouth ONE-HALF HOUR AFTER BREAKFAST.  Dose:  0.4 mg     torsemide 10 MG tablet  Commonly known as:  DEMADEX   TAKE THREE TABLETS BY MOUTH DAILY            Allergies  No Known Allergies    DIET  No orders of the defined types were placed in this encounter.      ACTIVITY  As tolerated    CONSULTATIONS  Pulmonary  Nephrology    PROCEDURES  Thoracentesis 10/26    LABORATORY  Lab Results   Component Value Date    SODIUM 139 10/27/2019    POTASSIUM 4.8 10/27/2019    CHLORIDE 98 10/27/2019    CO2 24 10/27/2019    GLUCOSE 94 10/27/2019    BUN 54 (H) 10/27/2019    CREATININE 6.41 (HH) 10/27/2019    CREATININE 2.1 (H) 05/06/2009        Lab Results   Component Value Date    WBC 5.3 10/27/2019    HEMOGLOBIN 8.9 (L) 10/27/2019    HEMATOCRIT 29.4 (L) 10/27/2019    PLATELETCT 157 (L) 10/27/2019        Total time of the discharge process exceeds 40 minutes.

## 2019-10-28 NOTE — PROGRESS NOTES
Discharge order written.IV removed and tolerated well. Tele removed and returned to monitor tech. Belongings gathered. Pt states that all personal belongings are in possession. AVS printed, reviewed and copy signed and placed on the chart. Patient has no further questions.  Prescriptions sent to home pharmacy. Discharged in satisfactory condition home with wife. Pt off unit via wheelchair, /escorted by JOHN Rodriguez.

## 2019-10-28 NOTE — PROGRESS NOTES
Telemetry Shift Summary    Rhythm SR 1st degree AVB w/ BBB  HR Range 60s-70s  Ectopy none  Measurements .22/.14/.46        Normal Values  Rhythm SR  HR Range    Measurements 0.12-0.20 / 0.06-0.10  / 0.30-0.52

## 2019-10-29 ENCOUNTER — APPOINTMENT (OUTPATIENT)
Dept: MEDICAL GROUP | Facility: MEDICAL CENTER | Age: 79
End: 2019-10-29
Payer: MEDICARE

## 2019-10-29 ENCOUNTER — NON-PROVIDER VISIT (OUTPATIENT)
Dept: WOUND CARE | Facility: MEDICAL CENTER | Age: 79
End: 2019-10-29
Attending: PHYSICIAN ASSISTANT
Payer: MEDICARE

## 2019-10-29 PROCEDURE — 97602 WOUND(S) CARE NON-SELECTIVE: CPT

## 2019-10-29 NOTE — PATIENT INSTRUCTIONS
Should you experience any significant changes in your wound(s) such as infection (redness, swelling, localized heat, increased pain, fever >101 F, chills) or have any questions regarding your home care instructions, please contact the wound center [phone]. If after hours, contact your primary care physician or go the hospital emergency room.  Keep dressing clean and dry and cover while bathing. Only change dressing if over saturated, soiled or its falling off.

## 2019-10-29 NOTE — PROCEDURES
Non selective with NS and gauze to remove non viable tissue from wound beds. Patient tolerated well.

## 2019-10-30 ENCOUNTER — ANTICOAGULATION MONITORING (OUTPATIENT)
Dept: VASCULAR LAB | Facility: MEDICAL CENTER | Age: 79
End: 2019-10-30

## 2019-10-30 ENCOUNTER — HOSPITAL ENCOUNTER (OUTPATIENT)
Dept: LAB | Facility: MEDICAL CENTER | Age: 79
End: 2019-10-30
Attending: NURSE PRACTITIONER
Payer: MEDICARE

## 2019-10-30 DIAGNOSIS — I48.92 ATRIAL FLUTTER, UNSPECIFIED TYPE (HCC): ICD-10-CM

## 2019-10-30 DIAGNOSIS — Z86.79 HISTORY OF ATRIAL FLUTTER: ICD-10-CM

## 2019-10-30 LAB
INR PPP: 2.37 (ref 0.87–1.13)
PROTHROMBIN TIME: 26.7 SEC (ref 12–14.6)

## 2019-10-30 PROCEDURE — 85610 PROTHROMBIN TIME: CPT

## 2019-10-30 PROCEDURE — 36415 COLL VENOUS BLD VENIPUNCTURE: CPT

## 2019-10-30 NOTE — PROGRESS NOTES
Anticoagulation Summary  As of 10/30/2019    INR goal:   2.0-3.0   TTR:   58.4 % (2.8 mo)   INR used for dosin.37 (10/30/2019)   Warfarin maintenance plan:   2.5 mg (5 mg x 0.5) every Mon, Fri; 5 mg (5 mg x 1) all other days   Weekly warfarin total:   30 mg   Plan last modified:   Catina Seymour (10/17/2019)   Next INR check:   2019   Target end date:   Indefinite    Indications    History of atrial flutter [Z86.79]             Anticoagulation Episode Summary     INR check location:   Anticoagulation Clinic    Preferred lab:       Send INR reminders to:       Comments:   [facility] lab      Anticoagulation Care Providers     Provider Role Specialty Phone number    [facility]   [phone]    Martha Light M.D.  Family Medicine [phone]        Anticoagulation Patient Findings          Left voicemail message to report a therapeutic INR of 2.37.  Pt to continue with current warfarin dosing regimen. Requested pt contact the clinic for any s/s of unusual bleeding, bruising, clotting or any changes to diet or medication. KARLENE 1 weeks.    Shanna Melendrez, Pharmacy Intern

## 2019-11-05 ENCOUNTER — OFFICE VISIT (OUTPATIENT)
Dept: WOUND CARE | Facility: MEDICAL CENTER | Age: 79
End: 2019-11-05
Attending: PHYSICIAN ASSISTANT
Payer: MEDICARE

## 2019-11-05 ENCOUNTER — OFFICE VISIT (OUTPATIENT)
Dept: CARDIOLOGY | Facility: MEDICAL CENTER | Age: 79
End: 2019-11-05
Payer: MEDICARE

## 2019-11-05 VITALS
TEMPERATURE: 98.2 F | DIASTOLIC BLOOD PRESSURE: 52 MMHG | RESPIRATION RATE: 14 BRPM | OXYGEN SATURATION: 91 % | SYSTOLIC BLOOD PRESSURE: 114 MMHG | HEART RATE: 71 BPM

## 2019-11-05 VITALS
SYSTOLIC BLOOD PRESSURE: 110 MMHG | DIASTOLIC BLOOD PRESSURE: 44 MMHG | BODY MASS INDEX: 23.19 KG/M2 | HEIGHT: 68 IN | OXYGEN SATURATION: 90 % | WEIGHT: 153 LBS | HEART RATE: 68 BPM

## 2019-11-05 DIAGNOSIS — Z99.2 ESRD (END STAGE RENAL DISEASE) ON DIALYSIS (HCC): ICD-10-CM

## 2019-11-05 DIAGNOSIS — Z86.79 HISTORY OF ATRIAL FLUTTER: ICD-10-CM

## 2019-11-05 DIAGNOSIS — M79.671 ISCHEMIC PAIN OF RIGHT FOOT: ICD-10-CM

## 2019-11-05 DIAGNOSIS — I99.8 ISCHEMIC TOE: Primary | ICD-10-CM

## 2019-11-05 DIAGNOSIS — I99.8 ISCHEMIC PAIN OF RIGHT FOOT: ICD-10-CM

## 2019-11-05 DIAGNOSIS — I35.0 SEVERE AORTIC STENOSIS: ICD-10-CM

## 2019-11-05 DIAGNOSIS — Z79.01 CHRONIC ANTICOAGULATION: Chronic | ICD-10-CM

## 2019-11-05 DIAGNOSIS — N18.6 ESRD (END STAGE RENAL DISEASE) ON DIALYSIS (HCC): ICD-10-CM

## 2019-11-05 DIAGNOSIS — I10 ESSENTIAL HYPERTENSION: ICD-10-CM

## 2019-11-05 DIAGNOSIS — S90.821D BLISTER OF RIGHT FOOT, SUBSEQUENT ENCOUNTER: ICD-10-CM

## 2019-11-05 DIAGNOSIS — R93.1 ELEVATED CORONARY ARTERY CALCIUM SCORE: ICD-10-CM

## 2019-11-05 PROCEDURE — 99214 OFFICE O/P EST MOD 30 MIN: CPT | Performed by: INTERNAL MEDICINE

## 2019-11-05 PROCEDURE — 11055 PARING/CUTG B9 HYPRKER LES 1: CPT

## 2019-11-05 PROCEDURE — 11055 PARING/CUTG B9 HYPRKER LES 1: CPT | Performed by: NURSE PRACTITIONER

## 2019-11-05 PROCEDURE — 11042 DBRDMT SUBQ TIS 1ST 20SQCM/<: CPT

## 2019-11-05 ASSESSMENT — MINNESOTA LIVING WITH HEART FAILURE QUESTIONNAIRE (MLHF)
GIVING YOU SIDE EFFECTS FROM TREATMENTS: 0
MAKING YOU SHORT OF BREATH: 3
HAVING TO SIT OR LIE DOWN DURING THE DAY: 3
WALKING ABOUT OR CLIMBING STAIRS DIFFICULT: 3
SWELLING IN ANKLES OR LEGS: 3
DIFFICULTY SOCIALIZING WITH FAMILY OR FRIENDS: 3
DIFFICULTY WITH RECREATIONAL PASTIMES, SPORTS, HOBBIES: 3
DIFFICULTY WORKING TO EARN A LIVING: 3
TOTAL_SCORE: 46
MAKING YOU STAY IN A HOSPITAL: 3
MAKING YOU FEEL DEPRESSED: 0
DIFFICULTY SLEEPING WELL AT NIGHT: 3
LOSS OF SELF CONTROL IN YOUR LIFE: 2
TIRED, FATIGUED OR LOW ON ENERGY: 3
COSTING YOU MONEY FOR MEDICAL CARE: 0
DIFFICULTY TO CONCENTRATE OR REMEMBERING THINGS: 0
MAKING YOU WORRY: 1
FEELING LIKE A BURDEN TO FAMILY AND FRIENDS: 1
DIFFICULTY GOING AWAY FROM HOME: 3
WORKING AROUND THE HOUSE OR YARD DIFFICULT: 5
DIFFICULTY WITH SEXUAL ACTIVITIES: 3
EATING LESS FOODS YOU LIKE: 1

## 2019-11-05 NOTE — PROGRESS NOTES
"Chief Complaint   Patient presents with   • Atrial Flutter     F/V: 1 YR       Subjective:   Ronny Castelan is a 79 y.o. male who presents today for evaluation in heart failure clinic because of recent admission with [place] nod secondary to a large pericardial effusion.  He does have a history of atrial flutter and has been considered for ablation.  His ejection fraction in June was 30% on JUSTIN.  However, during his recent admission it was 55 to 65%.  He was discharged on amiodarone therapy and warfarin anticoagulation.  He is now post a flutter ablation.    He has dyspnea on rather minimal exertion.  Just walking from one room to another will make him dyspneic.  He feels that some of his difficulty is the fact that he cannot ambulate well because of right lower extremity pain.  He has had no PND, orthopnea or edema.  He denies any chest discomfort, palpitations or lightheadedness.    Past Medical History:   Diagnosis Date   • Anesthesia     \"needs more sedation\" (can sometimes hear MD during procedure)    • Anticoagulation monitoring, special range    • Aortic stenosis     mod AS- concern for low flow severe AS with rEFof 30%   • Arterial leg ulcer ([facility]) 11/8/2018   • Atrial flutter ([facility]) 6/12/2019   • Basal cell carcinoma of left cheek 3/26/2015   • BPH 7/14/2009   • Bronchitis 12/25/2018   • CAD (coronary artery disease) 2/20/2014   • CATARACT     sanjuanita surgery complete   • CHF (congestive heart failure), NYHA class II, chronic, systolic ([facility]) E F30 in setting of atrial flutter 6/13/2019   • Chronic respiratory failure with hypoxia ([facility]) 5/8/2016   • CKD (chronic kidney disease) stage 4, GFR 15-29 ml/min ([facility]) 1/15/2010    end stage renal disease   • COPD (chronic obstructive pulmonary disease) ([facility])     wears 2.5 L o2 sometimes when he sleeps   • Detached retina    • Dialysis     m,w,f [facility]   • EMPHYSEMA    • Glaucoma     Early stage   • Gout    • Heart burn     occas   • Heart murmur     maria esther " [place] cardiologist   • Hypertension    • Indigestion     occas   • Kidney cyst    • Leg pain, bilateral 8/10/2015   • On supplemental oxygen therapy    • Peripheral vascular disease (HCC) 8/10/2015   • Pneumonia    • Primary insomnia 3/22/2018   • Proteinuria    • PVD (peripheral vascular disease) (HCC)    • Sleep apnea     O2 PER CANULA  AT NIGHT 2l/m     Past Surgical History:   Procedure Laterality Date   • AV FISTULA CREATION Right 8/6/2019    Procedure: CREATION, AV FISTULA -  RIGHT ARM  ,  and Ligation of Left Arm Fistula;  Surgeon: Sera Peters M.D.;  Location: [facility];  Service: General   • CATH PLACEMENT Right 8/6/2019    Procedure: INSERTION, CATHETER - PERMA ,;  Surgeon: Sera Peters M.D.;  Location: [facility];  Service: General   • JUSTIN  6/13/2019    Procedure: ECHOCARDIOGRAM, TRANSESOPHAGEAL;  Surgeon: Harvinder Hoang M.D.;  Location: [facility];  Service: Cardiac   • CARDIOVERSION  6/13/2019    Procedure: CARDIOVERSION;  Surgeon: Harvinder Hoang M.D.;  Location: [facility];  Service: Cardiac   • AV FISTULA CREATION Right 2/21/2019    Procedure: AV FISTULA CREATION - ARM;  Surgeon: David Cason M.D.;  Location: [facility];  Service: General   • FEMORAL ENDARTERECTOMY Left 1/25/2019    Procedure: FEMORAL ENDARTERECTOMY;  Surgeon: David Cason M.D.;  Location: [facility];  Service: General   • FEMORAL POPLITEAL BYPASS Left 1/25/2019    Procedure: LEFT FEMORAL POPLITEAL POLYTETRAFLUOROETHYLENE ePTFE(PROPATEN VASCULAR GRAFT) BYPASS;  Surgeon: David Cason M.D.;  Location: SURGERY [facility];  Service: General   • ANGIOGRAM Left 1/25/2019    Procedure: LEFT LEG ANGIOGRAM;  Surgeon: David Cason M.D.;  Location: [facility];  Service: General   • ENDOSCOPY PROCEDURE  3/21/2018    Procedure: ENDOSCOPY PROCEDURE/UPPER;  Surgeon: Enrique Child D.O.;  Location: SURGERY  [facility];  Service: Gastroenterology   • COLONOSCOPY - ENDO  8/15/2016    Procedure: COLONOSCOPY - ENDO;  Surgeon: Mane Whatley M.D.;  Location: ENDOSCOPY [facility];  Service:    • GASTROSCOPY WITH BIOPSY  8/13/2016    Procedure: GASTROSCOPY WITH BIOPSY;  Surgeon: Jorge Leavitt M.D.;  Location: ENDOSCOPY [facility];  Service:    • RECOVERY  12/23/2015    Procedure: VASCULAR CASE-CASON-LEFT ARM FISTULOGRAM WITH ANGIOPLASTY;  Surgeon: [facility];  Location: SURGERY PRE-POST PROC UNIT [facility];  Service:    • RECOVERY  3/24/2015    Performed by [facility] at SURGERY PRE-POST PROC UNIT [facility]   • RECOVERY  7/29/2014    Performed by [facility] at SURGERY SAME DAY [facility]   • RECOVERY  3/24/2014    Performed by [facility] at SURGERY [facility]   • RECOVERY  12/17/2013    Performed by [facility] at SURGERY SAME DAY [facility]S   • RECOVERY  7/2/2013    Performed by [facility] at SURGERY SAME DAY [facility]   • AV FISTULA THROMBOLYSIS  7/2/2013    Performed by David Cason M.D. at SURGERY [facility]   • RECOVERY  1/29/2013    Performed by [facility] at SURGERY SAME DAY [facility]S   • RECOVERY  7/23/2012    Performed by SURGERY, IR-RECOVERY at SURGERY SAME DAY [facility] ORS   • VITRECTOMY POSTERIOR  10/11/2011    Performed by NAHUM GE at SURGERY SAME DAY [facility] ORS   • RECOVERY  8/12/2011    Performed by SURGERY, IR-RECOVERY at SURGERY SAME DAY [facility] ORS   • VITRECTOMY POSTERIOR  1/18/2011    Performed by NAHUM GE at SURGERY SAME DAY [facility] ORS   • SCLERAL BUCKLING  1/18/2011    Performed by NAHUM GE at SURGERY SAME DAY [facility] ORS   • AV FISTULOGRAM  9/17/2010    Performed by DAVID CASON at SURGERY [facility]   • ANGIOPLASTY BALLOON  9/17/2010    Performed by DAVID CASON at SURGERY [facility]   • AV FISTULOGRAM  7/23/2010    Performed by DAVID CASON at  SURGERY [facility]   • ANGIOPLASTY BALLOON  7/23/2010    Performed by ALESHIA MOSCOSO at SURGERY [facility]   • AV FISTULA REVISION  2/19/2010    Performed by WILLIE HATCH at SURGERY [facility]   • AV FISTULA CREATION  2/12/2010    Performed by ALESHIA MOSCOSO at SURGERY [facility]   • CATARACT PHACO WITH IOL  4/8/08    Performed by STACEY PHILLIPS at SURGERY SAME DAY [facility]   • OTHER ORTHOPEDIC SURGERY  1998    right toe for facititis     Family History   Problem Relation Age of Onset   • Stroke Mother    • Hypertension Mother    • Lung Disease Father         Emphysema, resp failure   • Genitourinary () Problems Maternal Aunt         hematuria   • Hypertension Brother      Social History     Socioeconomic History   • Marital status:      Spouse name: Not on file   • Number of children: Not on file   • Years of education: Not on file   • Highest education level: Not on file   Occupational History   • Not on file   Social Needs   • Financial resource strain: Not on file   • Food insecurity:     Worry: Not on file     Inability: Not on file   • Transportation needs:     Medical: Not on file     Non-medical: Not on file   Tobacco Use   • Smoking status: Former Smoker     Packs/day: 1.00     Years: 40.00     Pack years: 40.00     Types: Cigarettes     Last attempt to quit: 1/1/2009     Years since quitting: 10.8   • Smokeless tobacco: Never Used   • Tobacco comment: 1 pk a day for 35 yrs, QUIT JAN 1 2010   Substance and Sexual Activity   • Alcohol use: No     Alcohol/week: 0.0 oz   • Drug use: No   • Sexual activity: Never     Partners: Female     Comment: , two sons, 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   Lifestyle   • Physical activity:     Days per week: Not on file     Minutes per session: Not on file   • Stress: Not on file   Relationships   • Social connections:     Talks on phone: Not on file     Gets together: Not on file     Attends Yarsani service: Not on file      Active member of club or organization: Not on file     Attends meetings of clubs or organizations: Not on file     Relationship status: Not on file   • Intimate partner violence:     Fear of current or ex partner: Not on file     Emotionally abused: Not on file     Physically abused: Not on file     Forced sexual activity: Not on file   Other Topics Concern   • Not on file   Social History Narrative   • Not on file     No Known Allergies  Outpatient Encounter Medications as of 11/5/2019   Medication Sig Dispense Refill   • Non Formulary Request 2 liters of Oxygen at home     • warfarin (COUMADIN) 5 MG Tab 1.5 tablets  Tonight = 7.5 mg then 1 tab 5mg every evening 45 Tab 0   • gabapentin (NEURONTIN) 300 MG Cap Take 1 Cap by mouth 3 times a day.     • ROPINIRole (REQUIP) 0.5 MG Tab TAKE TWO TABLETS BY MOUTH DAILY 60 Tab 3   • metoprolol (LOPRESSOR) 50 MG Tab Take 0.5 Tabs by mouth 2 Times a Day. 180 Tab 3   • torsemide (DEMADEX) 10 MG tablet TAKE THREE TABLETS BY MOUTH DAILY 30 Tab 2   • albuterol 108 (90 Base) MCG/ACT Aero Soln inhalation aerosol Inhale 2 Puffs by mouth every four hours as needed. 8.5 g 0   • tamsulosin (FLOMAX) 0.4 MG capsule Take 1 Cap by mouth ONE-HALF HOUR AFTER BREAKFAST. 90 Cap 0   • omeprazole (PRILOSEC) 40 MG delayed-release capsule Take 40 mg by mouth every morning.     • calcium acetate (PHOS-LO) 667 MG Cap Take 1,334 mg by mouth 3 times a day, with meals.     • levalbuterol (XOPENEX) 1.25 MG/3ML Nebu Soln 3 mL by Nebulization route every four hours as needed for Shortness of Breath. 120 Bullet 11   • pravastatin (PRAVACHOL) 20 MG Tab Take 20 mg by mouth every evening.     • guaiFENesin ER 1200 MG TABLET SR 12 HR Take 1 Tab by mouth every 12 hours. 28 Tab    • doxycycline (MONODOX) 100 MG capsule Take 1 Cap by mouth 2 times a day. (Patient not taking: Reported on 11/5/2019) 10 Cap 0   • cefdinir (OMNICEF) 300 MG Cap Take 1 Cap by mouth 2 times a day. (Patient not taking: Reported on  "11/5/2019) 10 Cap 0     No facility-administered encounter medications on file as of 11/5/2019.      ROS       Objective:   /44 (BP Location: Left arm, Patient Position: Sitting, BP Cuff Size: Adult)   Pulse 68   Ht 1.727 m (5' 8\")   Wt 69.4 kg (153 lb)   SpO2 90%   BMI 23.26 kg/m²     Physical Exam   Constitutional: He appears well-developed and well-nourished.   Neck: No JVD present.   Cardiovascular: Normal rate and regular rhythm.  No extrasystoles are present.   Murmur (3/6 systolic at the base) heard.  Pulmonary/Chest: Effort normal. He has wheezes. He has no rales.   Abdominal: Soft. There is no tenderness.   Musculoskeletal:         General: No edema.      Lab Results   Component Value Date/Time    CHOLSTRLTOT 90 (L) 06/22/2017 07:55 AM    LDL 26 06/22/2017 07:55 AM    HDL 57 06/22/2017 07:55 AM    TRIGLYCERIDE 37 06/22/2017 07:55 AM       Lab Results   Component Value Date/Time    SODIUM 139 10/27/2019 03:24 AM    POTASSIUM 4.8 10/27/2019 03:24 AM    CHLORIDE 98 10/27/2019 03:24 AM    CO2 24 10/27/2019 03:24 AM    GLUCOSE 94 10/27/2019 03:24 AM    BUN 54 (H) 10/27/2019 03:24 AM    CREATININE 6.41 (HH) 10/27/2019 03:24 AM    CREATININE 2.1 (H) 05/06/2009 10:08 AM     Lab Results   Component Value Date/Time    ALKPHOSPHAT 114 (H) 10/27/2019 03:24 AM    ASTSGOT 16 10/27/2019 03:24 AM    ALTSGPT 10 10/27/2019 03:24 AM    TBILIRUBIN 0.5 10/27/2019 03:24 AM      Lab Results   Component Value Date/Time    BNPBTYPENAT 1583 (H) 04/15/2017 04:15 PM              Echocardiography Laboratory    CONCLUSIONS  Compared to the images of the study done 8/1/19 - there has been   resolution of pericardial effusion.  Left ventricular ejection fraction is visually estimated to be 55%.  Moderate to severe aortic stenosis.Vmax is 3.50  m/s.  Transvalvular gradients are - Peak: 49 mmHg, Mean:  30 mmHg.  Dimensionless index is 0.2.  Right ventricular systolic pressure is estimated to be 55 mmHg.      PETER, " EDVIN CASTELAN  Exam Date:         08/02/2019     Limited echocardiogram from yesterday:  Pericardium  Normal pericardium without effusion.    Transthoracic  Echo Report        Echocardiography Laboratory     CONCLUSIONS  Prior Echo - 8/2/19, AS is now severe.  Left ventricular ejection fraction is visually estimated to be 60%.  Mild concentric left ventricular hypertrophy.  Grade II diastolic dysfunction.  Severely dilated left atrium.  Mild mitral regurgitation.  Severe aortic stenosis: Vmax is 4.28 m/s, MG 49 mmHg.  Mild tricuspid regurgitation.  Estimated right ventricular systolic pressure  is 50 mmHg.  Right atrial pressure is estimated to be 8 mmHg.     .     EDVIN CASTELAN  Exam Date:         10/24/2019    Assessment:     1. History of atrial flutter     2. ESRD (end stage renal disease) on dialysis ([facility])     3. Essential hypertension     4. Elevated coronary artery calcium score     5. Chronic anticoagulation     6. Severe aortic stenosis         Medical Decision Making:  Today's Assessment / Status / Plan:     Mr. Castelan has had progression of his aortic stenosis.  Our valve clinic did not feel he would be a good candidate for TAVR.  He would like a referral to [place]'s cardiology for a second opinion.  His blood pressure is low.  At this time, we will discontinue metoprolol.  I feel a higher heart rate will probably give him a better diastolic blood pressure.  He will follow-up in about a week.

## 2019-11-05 NOTE — PROCEDURES
"Contacted Dr. Peters regarding debridement status following arthrectomy and angioplasty to [facility]. She stated, \"Dry gangrene. Leave it alone.\" Notified HUAN Xiao, of what Dr. Peters said.   FAXED request to [place]'s medical records for recent imaging records on pt per Dameon's request.   "

## 2019-11-05 NOTE — PATIENT INSTRUCTIONS
Should you experience any significant changes in your wound(s), such as infection (redness, swelling, localized heat, increased pain, fever > 101 F, chills) or have any questions regarding your home care instructions, please contact the wound center at [phone]. If after hours, contact your primary care physician or go to the hospital emergency room.   Keep your dressing clean, dry and cover while bathing. Only change dressing if it becomes over saturated, soiled or falls off.

## 2019-11-06 ENCOUNTER — ANTICOAGULATION MONITORING (OUTPATIENT)
Dept: VASCULAR LAB | Facility: MEDICAL CENTER | Age: 79
End: 2019-11-06

## 2019-11-06 ENCOUNTER — HOSPITAL ENCOUNTER (OUTPATIENT)
Dept: LAB | Facility: MEDICAL CENTER | Age: 79
End: 2019-11-06
Attending: NURSE PRACTITIONER
Payer: MEDICARE

## 2019-11-06 DIAGNOSIS — Z86.79 HISTORY OF ATRIAL FLUTTER: ICD-10-CM

## 2019-11-06 DIAGNOSIS — I48.92 ATRIAL FLUTTER, UNSPECIFIED TYPE (HCC): ICD-10-CM

## 2019-11-06 LAB
INR PPP: 2.9 (ref 0.87–1.13)
PROTHROMBIN TIME: 31.4 SEC (ref 12–14.6)

## 2019-11-06 PROCEDURE — 85610 PROTHROMBIN TIME: CPT

## 2019-11-06 PROCEDURE — 36415 COLL VENOUS BLD VENIPUNCTURE: CPT

## 2019-11-06 ASSESSMENT — ENCOUNTER SYMPTOMS
DIZZINESS: 0
NAUSEA: 0
CLAUDICATION: 0
DIARRHEA: 0
BLURRED VISION: 0
HEADACHES: 0
FEVER: 0
VOMITING: 0
CONSTIPATION: 0
DEPRESSION: 0
NERVOUS/ANXIOUS: 0
DOUBLE VISION: 0
COUGH: 0
CHILLS: 0

## 2019-11-06 NOTE — PROGRESS NOTES
"Provider Encounter- Full Thickness wound    HISTORY OF PRESENT ILLNESS                              START OF CARE IN CLINIC: 0912/2019                    REFERRING PROVIDER: Jeanine Peterson PA-C                 WOUND- Full thickness wound              LOCATION: R dorsal hand,                                   Ischemic wound of R distal hallux                                  Blister to right medial ankle                                  Right lateral heel partial-thickness wound              WOUND HISTORY: Pt reports scraping his hand on a drawer about a week ago and getting a skin tear. He has been putting abx ointment and band aid on it, says it isn't healing. He denies having any kind of lesion at the site prior to scraping it. Pt says he has had a painful blister on R hallux for \"several weeks\" that \"dried up but hurts like hell\". He has been referred to Dr Peters (vascular) and has undergone an arterial duplex at her office, but has not gone in for f/u yet.                PERTINENT PMH: PAD, hx of basal cell ca                  IMAGING:none              VASCULAR STUDIES: August 06 2019, and recently at Dr. Peters's office.                                   LAST  WOUND CULTURE:  DATE : na                                           DIABETES: no  MOST RECENT A1C:       TOBACCO USE: hx 1 PPD x 35yr, quit in 2010 9/19/2019 : Clinic visit with ELIDA Guzman.  Initial provider assessment.  Patient underwent an aortogram with angiogram atherectomy and angioplasty with Dr. Sera Peters on 9/14.  He presents today with stable eschar to his right distal plantar hallux.  He does have a new blister to his right medial ankle, and a new shallow open wound to his right lateral heel.  Besides his foot wounds, he has a skin tear to the dorsal aspect of his right hand that we have been treating in the clinic.  He states he is feeling well overall, however has quite a bit of pain in his toes.  DP and PT pulses found " "with Doppler, somewhat biphasic.    9/24/2019 : Clinic visit with ELIDA Guzman. Patient is feeling well, denies fevers, chills, nausea, or vomiting.  The wounds to his dorsal hand, right medial ankle, and right lateral heel have all improved, area has decreased.  The eschar to his distal right hallux remains stable.  Right DP and PT pulses Mono to biphasic with Doppler.  He is supposed to follow-up with Dr. Peters in her clinic tomorrow    11/5/2019: : Clinic visit with ELIDA Dudley. Patient states that they are feeling well today.  Patient denies fever, chills, nausea, vomiting, lightheadedness, dizziness, shortness of breath and chest pain.  Wound to lateral right knee has improved scab has reduced in size.  Wound to right lateral heel has resolved. Wound to right distal helix has eschar covering wound bed.  Minimal debridement to callused area surrounding eschar.  Eschar is firm and will be painted with Betadine.  Dr. Peters consulted on whether or not to debride area.          RESULTS:   FINDINGS   Right.    Stenosis of the common, profunda and superficial femoral  arteries .    Velocities are consistent with 50-75% stenosis.    atherosclerosis of the right popliteal artery with low amplitude monophasic    form.   Tibial arteries showed atherosclerosis with very low amplitude monophasic    forms.   Right peroneal artery seems occluded distally.     Left.    Left CFA not visualized due to central line.     Abdelrahman VIEYRA To   (Electronically Signed)   Final Date:      06 August 2019                     14:51                  PAST MEDICAL HISTORY:   Past Medical History:   Diagnosis Date   • Anesthesia     \"needs more sedation\" (can sometimes hear MD during procedure)    • Anticoagulation monitoring, special range    • Aortic stenosis     mod AS- concern for low flow severe AS with rEFof 30%   • Arterial leg ulcer (HCC) 11/8/2018   • Atrial flutter (HCC) 6/12/2019   • Basal cell carcinoma of " left cheek 3/26/2015   • BPH 7/14/2009   • Bronchitis 12/25/2018   • CAD (coronary artery disease) 2/20/2014   • CATARACT     sanjuanita surgery complete   • CHF (congestive heart failure), NYHA class II, chronic, systolic ([facility]) E F30 in setting of atrial flutter 6/13/2019   • Chronic respiratory failure with hypoxia ([facility]) 5/8/2016   • CKD (chronic kidney disease) stage 4, GFR 15-29 ml/min ([facility]) 1/15/2010    end stage renal disease   • COPD (chronic obstructive pulmonary disease) ([facility])     wears 2.5 L o2 sometimes when he sleeps   • Detached retina    • Dialysis     m,w,f [facility]   • EMPHYSEMA    • Glaucoma     Early stage   • Gout    • Heart burn     occas   • Heart murmur     maria esther lee cardiologist   • Hypertension    • Indigestion     occas   • Kidney cyst    • Leg pain, bilateral 8/10/2015   • On supplemental oxygen therapy    • Peripheral vascular disease ([facility]) 8/10/2015   • Pneumonia    • Primary insomnia 3/22/2018   • Proteinuria    • PVD (peripheral vascular disease) ([facility])    • Sleep apnea     O2 PER CANULA  AT NIGHT 2l/m       PAST SURGICAL HISTORY:   Past Surgical History:   Procedure Laterality Date   • AV FISTULA CREATION Right 8/6/2019    Procedure: CREATION, AV FISTULA -  RIGHT ARM  ,  and Ligation of Left Arm Fistula;  Surgeon: Sera Peters M.D.;  Location: [facility];  Service: General   • CATH PLACEMENT Right 8/6/2019    Procedure: INSERTION, CATHETER - PERMA ,;  Surgeon: Sera Peters M.D.;  Location: [facility];  Service: General   • JUSTIN  6/13/2019    Procedure: ECHOCARDIOGRAM, TRANSESOPHAGEAL;  Surgeon: Harvinder Hoang M.D.;  Location: [facility];  Service: Cardiac   • CARDIOVERSION  6/13/2019    Procedure: CARDIOVERSION;  Surgeon: Harvinder Hoang M.D.;  Location: [facility];  Service: Cardiac   • AV FISTULA CREATION Right 2/21/2019    Procedure: AV FISTULA CREATION - ARM;  Surgeon: David Cason M.D.;   Location: SURGERY [facility];  Service: General   • FEMORAL ENDARTERECTOMY Left 1/25/2019    Procedure: FEMORAL ENDARTERECTOMY;  Surgeon: David Cason M.D.;  Location: SURGERY [facility];  Service: General   • FEMORAL POPLITEAL BYPASS Left 1/25/2019    Procedure: LEFT FEMORAL POPLITEAL POLYTETRAFLUOROETHYLENE ePTFE(PROPATEN VASCULAR GRAFT) BYPASS;  Surgeon: David Cason M.D.;  Location: SURGERY [facility];  Service: General   • ANGIOGRAM Left 1/25/2019    Procedure: LEFT LEG ANGIOGRAM;  Surgeon: David Cason M.D.;  Location: SURGERY [facility];  Service: General   • ENDOSCOPY PROCEDURE  3/21/2018    Procedure: ENDOSCOPY PROCEDURE/UPPER;  Surgeon: Enrique Child D.O.;  Location: SURGERY [facility];  Service: Gastroenterology   • COLONOSCOPY - ENDO  8/15/2016    Procedure: COLONOSCOPY - ENDO;  Surgeon: Mane Whaltey M.D.;  Location: ENDOSCOPY [facility];  Service:    • GASTROSCOPY WITH BIOPSY  8/13/2016    Procedure: GASTROSCOPY WITH BIOPSY;  Surgeon: Jorge Leavitt M.D.;  Location: ENDOSCOPY [facility];  Service:    • RECOVERY  12/23/2015    Procedure: VASCULAR CASE-CASON-LEFT ARM FISTULOGRAM WITH ANGIOPLASTY;  Surgeon: [facility];  Location: SURGERY PRE-POST PROC UNIT [facility];  Service:    • RECOVERY  3/24/2015    Performed by [facility] at SURGERY PRE-POST PROC UNIT [facility]   • RECOVERY  7/29/2014    Performed by [facility] at SURGERY SAME DAY [facility]   • RECOVERY  3/24/2014    Performed by [facility] at SURGERY [facility]   • RECOVERY  12/17/2013    Performed by [facility] at SURGERY SAME DAY [facility]   • RECOVERY  7/2/2013    Performed by [facility] at SURGERY SAME DAY [facility]   • AV FISTULA THROMBOLYSIS  7/2/2013    Performed by David Cason M.D. at SURGERY [facility]   • RECOVERY  1/29/2013    Performed by [facility] at SURGERY SAME DAY [facility]   • RECOVERY   7/23/2012    Performed by SURGERY, IR-RECOVERY at SURGERY SAME DAY [facility]   • VITRECTOMY POSTERIOR  10/11/2011    Performed by NAHUM GE at SURGERY SAME DAY [facility]   • RECOVERY  8/12/2011    Performed by SURGERY, IR-RECOVERY at SURGERY SAME DAY [facility]   • VITRECTOMY POSTERIOR  1/18/2011    Performed by NAHUM GE at SURGERY SAME DAY [facility]   • SCLERAL BUCKLING  1/18/2011    Performed by NAHUM GE at SURGERY SAME DAY [facility]   • AV FISTULOGRAM  9/17/2010    Performed by ALESHIA MOSCOSO at SURGERY [facility]   • ANGIOPLASTY BALLOON  9/17/2010    Performed by ALESHIA MOSCOSO at SURGERY [facility]   • AV FISTULOGRAM  7/23/2010    Performed by ALESHIA MOSCOSO at SURGERY [facility]   • ANGIOPLASTY BALLOON  7/23/2010    Performed by ALESHIA MOSCOSO at SURGERY [facility]   • AV FISTULA REVISION  2/19/2010    Performed by WILLIE HATCH at SURGERY [facility]   • AV FISTULA CREATION  2/12/2010    Performed by ALESHIA MOSCOSO at SURGERY [facility] ORS   • CATARACT PHACO WITH IOL  4/8/08    Performed by STACEY PHILLIPS at SURGERY SAME DAY [facility]   • OTHER ORTHOPEDIC SURGERY  1998    right toe for facititis        MEDICATIONS:   Current Outpatient Medications   Medication   • Non Formulary Request   • warfarin (COUMADIN) 5 MG Tab   • gabapentin (NEURONTIN) 300 MG Cap   • ROPINIRole (REQUIP) 0.5 MG Tab   • metoprolol (LOPRESSOR) 50 MG Tab   • torsemide (DEMADEX) 10 MG tablet   • albuterol 108 (90 Base) MCG/ACT Aero Soln inhalation aerosol   • tamsulosin (FLOMAX) 0.4 MG capsule   • omeprazole (PRILOSEC) 40 MG delayed-release capsule   • calcium acetate (PHOS-LO) 667 MG Cap   • levalbuterol (XOPENEX) 1.25 MG/3ML Nebu Soln   • pravastatin (PRAVACHOL) 20 MG Tab     No current facility-administered medications for this visit.        ALLERGIES:  No Known Allergies      SOCIAL HISTORY:   Social History     Socioeconomic History   •  Marital status:      Spouse name: Not on file   • Number of children: Not on file   • Years of education: Not on file   • Highest education level: Not on file   Occupational History   • Not on file   Social Needs   • Financial resource strain: Not on file   • Food insecurity:     Worry: Not on file     Inability: Not on file   • Transportation needs:     Medical: Not on file     Non-medical: Not on file   Tobacco Use   • Smoking status: Former Smoker     Packs/day: 1.00     Years: 40.00     Pack years: 40.00     Types: Cigarettes     Last attempt to quit: 1/1/2009     Years since quitting: 10.8   • Smokeless tobacco: Never Used   • Tobacco comment: 1 pk a day for 35 yrs, QUIT JAN 1 2010   Substance and Sexual Activity   • Alcohol use: No     Alcohol/week: 0.0 oz   • Drug use: No   • Sexual activity: Never     Partners: Female     Comment: , two sons, 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   Lifestyle   • Physical activity:     Days per week: Not on file     Minutes per session: Not on file   • Stress: Not on file   Relationships   • Social connections:     Talks on phone: Not on file     Gets together: Not on file     Attends Orthodoxy service: Not on file     Active member of club or organization: Not on file     Attends meetings of clubs or organizations: Not on file     Relationship status: Not on file   • Intimate partner violence:     Fear of current or ex partner: Not on file     Emotionally abused: Not on file     Physically abused: Not on file     Forced sexual activity: Not on file   Other Topics Concern   • Not on file   Social History Narrative   • Not on file       FAMILY HISTORY:   Family History   Problem Relation Age of Onset   • Stroke Mother    • Hypertension Mother    • Lung Disease Father         Emphysema, resp failure   • Genitourinary () Problems Maternal Aunt         hematuria   • Hypertension Brother         REVIEW OF SYSTEMS:   Review of Systems   Constitutional: Negative for chills and  fever.   HENT: Negative for hearing loss.    Eyes: Negative for blurred vision and double vision.   Respiratory: Negative for cough.    Cardiovascular: Positive for leg swelling. Negative for chest pain and claudication.        Denies claudication pain with ambulation, though reports chronic pain in his toes  Mild swelling in his right lower leg   Gastrointestinal: Negative for constipation, diarrhea, nausea and vomiting.   Genitourinary:        Voids very little  On dialysis 3 days/week   Musculoskeletal: Positive for joint pain.   Skin: Negative for itching and rash.        Scab to right knee  Healing blister to first digit right hand  Firm eschar to first hallux right foot   Neurological: Negative for dizziness and headaches.        Slight numbness in his feet   Psychiatric/Behavioral: Negative for depression. The patient is not nervous/anxious.        PHYSICAL EXAMINATION:   /52   Pulse 71   Temp 36.8 °C (98.2 °F)   Resp 14   SpO2 91%   Physical Exam   Constitutional: He is oriented to person, place, and time and well-developed, well-nourished, and in no distress.   HENT:   Head: Normocephalic.   Right Ear: External ear normal.   Left Ear: External ear normal.   Eyes: Pupils are equal, round, and reactive to light. Conjunctivae are normal.   Neck: Normal range of motion.   Cardiovascular:   Unable to palpate pedal pulses  Mono to biphasic by Doppler  Feet warm   Pulmonary/Chest: Effort normal. No respiratory distress. He has no wheezes.   Musculoskeletal:         General: Edema present.      Comments: Nonpitting edema of right lower extremity  Hemosiderin staining of both lower legs   Neurological: He is alert and oriented to person, place, and time.   Skin: Skin is warm. No rash noted.   Skin healing blister to dorsum of first digit right hand  Ischemic wound to right distal/plantar hallux  Refer to wound flowsheet and photos   Psychiatric: Mood, memory, affect and judgment normal.       Wound  Assessment        Wound 09/12/19 Arterial Ulcer Toe (Comment which one) R distal hallux dry necrotic area, arterial (Active)   Wound Image   10/29/2019  1:30 PM   Site Assessment Black;Dry 11/5/2019 11:00 AM   Agata-wound Assessment Non-blanchable erythema;Dry;Edema 11/5/2019 11:00 AM   Margins Attached edges 11/5/2019 11:00 AM   Wound Length (cm) 2 cm 11/5/2019 11:00 AM   Wound Width (cm) 3 cm 11/5/2019 11:00 AM   Wound Surface Area (cm^2) 6 cm^2 11/5/2019 11:00 AM   Post Wound Length (cm) 2 cm 10/29/2019  1:30 PM    Post Wound Width (cm) 3 cm 10/29/2019  1:30 PM   Post Wound Surface Area (cm^2) 6 cm^2 10/29/2019  1:30 PM   Closure Secondary intention 10/15/2019  1:30 PM   Drainage Amount None 11/5/2019 11:00 AM   Non-staged Wound Description Full thickness 11/5/2019 11:00 AM   Treatments Cleansed 10/15/2019  1:30 PM   Periwound Protectant Not Applicable 11/5/2019 11:00 AM   Dressing Options Hypafix Tape;Dry Gauze 11/5/2019 11:00 AM   Dressing Cleansing/Solutions 3% Betadine 11/5/2019 11:00 AM   Dressing Changed Changed 10/29/2019  1:30 PM   Dressing Change Frequency Daily 11/5/2019 11:00 AM   WOUND NURSE ONLY - Odor None 11/5/2019 11:00 AM   WOUND NURSE ONLY - Pulses Right;1+ 11/5/2019 11:00 AM   WOUND NURSE ONLY - Tissue Type and Percentage 100% dry eschar 11/5/2019 11:00 AM       Wound 09/19/19 Heel Right Lateral Heel (Active)   Wound Image   10/29/2019  1:30 PM   Agata-wound Assessment Edema;Fragile 11/5/2019 11:00 AM   Margins Attached edges 11/5/2019 11:00 AM   Wound Length (cm) 1.5 cm 10/15/2019  1:30 PM   Wound Width (cm) 2.5 cm 10/15/2019  1:30 PM   Wound Surface Area (cm^2) 3.75 cm^2 10/15/2019  1:30 PM    Post Wound Width (cm) 0.5 cm 10/15/2019  1:30 PM   Post Wound Surface Area (cm^2) 0.25 cm^2 10/15/2019  1:30 PM   Closure Secondary intention 10/15/2019  1:30 PM   Drainage Amount None 11/5/2019 11:00 AM   Drainage Description Serosanguineous 9/24/2019 10:52 AM   Treatments Cleansed 10/15/2019  1:30 PM    Periwound Protectant Skin Protectant wipes to Periwound 9/24/2019 10:52 AM   Dressing Options Nonadhesive Foam;Hypafix Tape 11/5/2019 11:00 AM   Dressing Cleansing/Solutions 3% Betadine 11/5/2019 11:00 AM   Dressing Changed Changed 10/29/2019  1:30 PM   Dressing Status Clean;Dry;Intact 11/5/2019 11:00 AM   Dressing Change Frequency Weekly 11/5/2019 11:00 AM   WOUND NURSE ONLY - Odor None 10/29/2019  1:30 PM   WOUND NURSE ONLY - Tissue Type and Percentage 100% dry stable eschar 10/29/2019  1:30 PM       Wound 10/01/19 Other (comment) Knee Right lateral knee (Active)   Wound Image   10/29/2019  1:30 PM   Site Assessment Dry;Epithelialization 11/5/2019 11:00 AM   Agata-wound Assessment Intact;Dry;Edema 11/5/2019 11:00 AM   Margins Attached edges 11/5/2019 11:00 AM   Wound Length (cm) 1.2 cm 10/15/2019  1:30 PM   Wound Width (cm) 3 cm 10/15/2019  1:30 PM   Wound Depth (cm) 0.1 cm 10/15/2019  1:30 PM   Wound Surface Area (cm^2) 3.6 cm^2 10/15/2019  1:30 PM    Post Wound Width (cm) 2.8 cm 10/15/2019  1:30 PM   Post Wound Depth (cm) 0.1 cm 10/15/2019  1:30 PM   Post Wound Surface Area (cm^2) 3.08 cm^2 10/15/2019  1:30 PM   Tunneling 0 cm 10/15/2019  1:30 PM   Undermining 0 cm 10/15/2019  1:30 PM   Closure Secondary intention 10/15/2019  1:30 PM   Drainage Amount None 11/5/2019 11:00 AM   Treatments Cleansed 10/1/2019 11:00 AM   Cleansing Normal Saline Irrigation 10/8/2019 11:00 AM   Periwound Protectant Skin Protectant wipes to Periwound 10/8/2019 11:00 AM   Dressing Options Adhesive Foam;Other (Comments) 11/5/2019 11:00 AM   Dressing Cleansing/Solutions 3% Betadine 10/8/2019 11:00 AM   Dressing Changed New 10/1/2019 11:00 AM   Dressing Change Frequency Weekly 11/5/2019 11:00 AM   WOUND NURSE ONLY - Odor None 10/29/2019  1:30 PM   WOUND NURSE ONLY - Exposed Structures None 10/29/2019  1:30 PM   WOUND NURSE ONLY - Tissue Type and Percentage 100% scabbed with new epithelialization around edges 10/29/2019  1:30 PM         PROCEDURE: Excisional debridement of right dorsal hand wound, right medial ankle wounds, and right lateral heel wounds  -2% viscous lidocaine applied topically to wound beds for approximately 5 minutes prior to debridement  -  4m curette used to debride wound beds.  Excisional debridement was performed to remove devitalized callus.      -Wound care completed by  Maryan Smith RN-refer to flowsheet      PATIENT EDUCATION  -Advised to go to ER for any increased redness, swelling, drainage or odor, or if patient develops fever, chills, nausea or vomiting.  -Importance of adequate nutrition for wound healing  -Increase protein intake (unless contraindicated by renal status)    ASSESSMENT AND PLAN:       1. Ischemic toe    11/5/2019: Stable dry eschar to distal right hallux.  No drainage,  no fluctuance.  Periwound erythema  -Minimal debridement to surrounding callus, distal right helix painted with Betadine    2. Blister of right foot, subsequent encounter    11/5/2018: Blister on right heel has resolved       3. Ischemic pain of right foot    11/5/2019: Per patient, pain slightly better today.  -Angiogram with atherectomy and angioplasty on 9/14.  Pain has mildly decreased per patient since perfusion from arthrectomy.        Please note that this dictation was created using voice recognition software. I have worked with technical experts from [facility], [facility] to optimize the interface.  I have made every reasonable attempt to correct obvious errors, but there may be errors of grammar and possibly content that I did not discover before finalizing the note.

## 2019-11-07 NOTE — PROGRESS NOTES
Anticoagulation Summary  As of 2019    INR goal:   2.0-3.0   TTR:   61.6 % (3 mo)   INR used for dosin.90 (2019)   Warfarin maintenance plan:   2.5 mg (5 mg x 0.5) every Mon, Fri; 5 mg (5 mg x 1) all other days   Weekly warfarin total:   30 mg   Plan last modified:   Catina OSEI Filter (10/17/2019)   Next INR check:   2019   Target end date:   Indefinite    Indications    History of atrial flutter [Z86.79]             Anticoagulation Episode Summary     INR check location:   Anticoagulation Clinic    Preferred lab:       Send INR reminders to:       Comments:   [facility]      Anticoagulation Care Providers     Provider Role Specialty Phone number    [facility]   [phone]    Martha Light M.D.  [facility] Medicine [phone]        Anticoagulation Patient Findings      Left voicemail message to report a therapeutic INR of 2.9.    Pt to continue with current warfarin dosing regimen. Requested pt contact the clinic for any s/s of unusual bleeding, bruising, clotting or any changes to diet or medication.    FU INR in 1 week(s).    Luz Horton, PharmD

## 2019-11-12 ENCOUNTER — NON-PROVIDER VISIT (OUTPATIENT)
Dept: WOUND CARE | Facility: MEDICAL CENTER | Age: 79
End: 2019-11-12
Attending: PHYSICIAN ASSISTANT
Payer: MEDICARE

## 2019-11-12 ENCOUNTER — OFFICE VISIT (OUTPATIENT)
Dept: CARDIOLOGY | Facility: MEDICAL CENTER | Age: 79
End: 2019-11-12
Payer: MEDICARE

## 2019-11-12 ENCOUNTER — HOSPITAL ENCOUNTER (OUTPATIENT)
Dept: RADIOLOGY | Facility: MEDICAL CENTER | Age: 79
End: 2019-11-12
Attending: INTERNAL MEDICINE
Payer: MEDICARE

## 2019-11-12 VITALS
DIASTOLIC BLOOD PRESSURE: 64 MMHG | WEIGHT: 146 LBS | HEIGHT: 68 IN | HEART RATE: 78 BPM | OXYGEN SATURATION: 93 % | BODY MASS INDEX: 22.13 KG/M2 | SYSTOLIC BLOOD PRESSURE: 148 MMHG

## 2019-11-12 DIAGNOSIS — I35.0 SEVERE AORTIC STENOSIS: ICD-10-CM

## 2019-11-12 DIAGNOSIS — Z79.01 CHRONIC ANTICOAGULATION: Chronic | ICD-10-CM

## 2019-11-12 DIAGNOSIS — N18.6 ESRD (END STAGE RENAL DISEASE) ON DIALYSIS (HCC): ICD-10-CM

## 2019-11-12 DIAGNOSIS — E78.5 DYSLIPIDEMIA: ICD-10-CM

## 2019-11-12 DIAGNOSIS — Z99.2 ESRD (END STAGE RENAL DISEASE) ON DIALYSIS (HCC): ICD-10-CM

## 2019-11-12 DIAGNOSIS — R91.8 LUNG FIELD ABNORMAL FINDING ON EXAMINATION: ICD-10-CM

## 2019-11-12 DIAGNOSIS — I10 ESSENTIAL HYPERTENSION: ICD-10-CM

## 2019-11-12 DIAGNOSIS — R93.1 ELEVATED CORONARY ARTERY CALCIUM SCORE: ICD-10-CM

## 2019-11-12 PROCEDURE — 99211 OFF/OP EST MAY X REQ PHY/QHP: CPT

## 2019-11-12 PROCEDURE — 71046 X-RAY EXAM CHEST 2 VIEWS: CPT

## 2019-11-12 PROCEDURE — 99214 OFFICE O/P EST MOD 30 MIN: CPT | Performed by: INTERNAL MEDICINE

## 2019-11-12 ASSESSMENT — ENCOUNTER SYMPTOMS
LOSS OF CONSCIOUSNESS: 0
DIZZINESS: 0
CHILLS: 0
PND: 0
BLOOD IN STOOL: 0
DIAPHORESIS: 0
PALPITATIONS: 0
COUGH: 0
CLAUDICATION: 0
ORTHOPNEA: 0
ABDOMINAL PAIN: 0
FEVER: 0

## 2019-11-12 NOTE — PROGRESS NOTES
"Cardiology/Electrophysiology Follow-up Note    Subjective:   Chief Complaint:   Chief Complaint   Patient presents with   • Atrial Flutter     FV   • Evaluation Of Medication Change     Andreina Castelan is a 79 y.o. male who presents today for follow up for typical atrial flutter s/p RFA by Dr. Grissom on 10/23/19.     He is followed by Dr. Crum and Dr. Grissom.  Last seen by Dr. Mendoza on 11/12/19. Chest xray was ordered for continue dyspnea. His metoprolol was discontinued.     Recent hospitalization on 10/24/19 s/p RFA for acute on chronic respiratory failure/ESRD. He underwent right thoracentesis with 1050 cc removed.     Medical history significant for typical atrial flutter s/p RFA by Dr. Grissom on 10/23/19, on chronic anticoagulation with Coumadin, ESRD with right AV fistula and dialysis MWF, CAD (no interventions), CHF, Hypertension, hyperlipidemia, PVD with chronic claudication, BPH, HELGA- not on CPAP, COPD on oxygen as needed, gout, and severe AS.    He presents today in follow up with his wife, he states he is doing ok.  Reports continued dyspnea.   He denies chest pain, dizziness, palpitations, pre syncope or syncope, or lower extremity edema. Denies any signs or symptoms of bleeding or bleeding issues. Patient endorses medication compliance.     He is ESRD, receiving HD M,W,F. On coumadin, monitored by anticoagulation clinic.  Followed by Dr. Peters for his peripheral vascular disease. Scheduled for consult with Dr. Godoy at [facility] for second opinion for aortic stenosis.     Past Medical History:   Diagnosis Date   • Anesthesia     \"needs more sedation\" (can sometimes hear MD during procedure)    • Anticoagulation monitoring, special range    • Aortic stenosis     mod AS- concern for low flow severe AS with rEFof 30%   • Arterial leg ulcer (HCC) 11/8/2018   • Atrial flutter (HCC) 6/12/2019   • Basal cell carcinoma of left cheek 3/26/2015   • BPH 7/14/2009   • Bronchitis 12/25/2018   • CAD " (coronary artery disease) 2/20/2014   • CATARACT     sanjuanita surgery complete   • CHF (congestive heart failure), NYHA class II, chronic, systolic (formerly [facility]) E F30 in setting of atrial flutter 6/13/2019   • Chronic respiratory failure with hypoxia (formerly [facility]) 5/8/2016   • CKD (chronic kidney disease) stage 4, GFR 15-29 ml/min (formerly [facility]) 1/15/2010    end stage renal disease   • COPD (chronic obstructive pulmonary disease) (formerly [facility])     wears 2.5 L o2 sometimes when he sleeps   • Detached retina    • Dialysis     m,w,f [facility] [place]   • EMPHYSEMA    • Glaucoma     Early stage   • Gout    • Heart burn     occas   • Heart murmur     maria esther lee cardiologist   • Hypertension    • Indigestion     occas   • Kidney cyst    • Leg pain, bilateral 8/10/2015   • On supplemental oxygen therapy    • Peripheral vascular disease (formerly [facility]) 8/10/2015   • Pneumonia    • Primary insomnia 3/22/2018   • Proteinuria    • PVD (peripheral vascular disease) (formerly [facility])    • Sleep apnea     O2 PER CANULA  AT NIGHT 2l/m     Past Surgical History:   Procedure Laterality Date   • AV FISTULA CREATION Right 8/6/2019    Procedure: CREATION, AV FISTULA -  RIGHT ARM  ,  and Ligation of Left Arm Fistula;  Surgeon: Sera Peters M.D.;  Location: SURGERY [facility];  Service: General   • CATH PLACEMENT Right 8/6/2019    Procedure: INSERTION, CATHETER - PERMA ,;  Surgeon: Sera Peters M.D.;  Location: [facility];  Service: General   • JUSTIN  6/13/2019    Procedure: ECHOCARDIOGRAM, TRANSESOPHAGEAL;  Surgeon: Harvinder Hoang M.D.;  Location: [facility];  Service: Cardiac   • CARDIOVERSION  6/13/2019    Procedure: CARDIOVERSION;  Surgeon: Harvinder Hoang M.D.;  Location: [facility];  Service: Cardiac   • AV FISTULA CREATION Right 2/21/2019    Procedure: AV FISTULA CREATION - ARM;  Surgeon: David Cason M.D.;  Location: [facility];  Service: General   • FEMORAL ENDARTERECTOMY Left 1/25/2019     Procedure: FEMORAL ENDARTERECTOMY;  Surgeon: David Cason M.D.;  Location: SURGERY [facility];  Service: General   • FEMORAL POPLITEAL BYPASS Left 1/25/2019    Procedure: LEFT FEMORAL POPLITEAL POLYTETRAFLUOROETHYLENE ePTFE(PROPATEN VASCULAR GRAFT) BYPASS;  Surgeon: David Cason M.D.;  Location: SURGERY [facility];  Service: General   • ANGIOGRAM Left 1/25/2019    Procedure: LEFT LEG ANGIOGRAM;  Surgeon: David Cason M.D.;  Location: SURGERY [facility];  Service: General   • ENDOSCOPY PROCEDURE  3/21/2018    Procedure: ENDOSCOPY PROCEDURE/UPPER;  Surgeon: Enrique Child D.O.;  Location: SURGERY [facility];  Service: Gastroenterology   • COLONOSCOPY - ENDO  8/15/2016    Procedure: COLONOSCOPY - ENDO;  Surgeon: Mane Whatley M.D.;  Location: ENDOSCOPY [facility];  Service:    • GASTROSCOPY WITH BIOPSY  8/13/2016    Procedure: GASTROSCOPY WITH BIOPSY;  Surgeon: Jorge Leavitt M.D.;  Location: ENDOSCOPY [facility];  Service:    • RECOVERY  12/23/2015    Procedure: VASCULAR CASE-CASON-LEFT ARM FISTULOGRAM WITH ANGIOPLASTY;  Surgeon: Recoveryonly Surgery;  [place] [facility] [place]  Service:    • RECOVERY  3/24/2015    Performed by [facility] at SURGERY PRE[place] [facility] [place]   • RECOVERY  7/29/2014    Performed by [facility]Recovery Surgery at SURGERY SAME DAY [facility]   • RECOVERY  3/24/2014    Performed by [facility] at SURGERY [facility]   • RECOVERY  12/17/2013    Performed by [facility] at SURGERY [facility] DAY [facility]   • RECOVERY  7/2/2013    Performed by [facility] at SURGERY SAME DAY [facility]   • AV FISTULA THROMBOLYSIS  7/2/2013    Performed by David Cason M.D. at SURGERY [facility]   • RECOVERY  1/29/2013    Performed by [facility] Surgery at SURGERY SAME DAY [facility]   • RECOVERY  7/23/2012    Performed by SURGERY, IR-RECOVERY at SURGERY SAME DAY [facility]   • VITRECTOMY POSTERIOR   10/11/2011    Performed by NAHUM GE at SURGERY SAME DAY [facility]   • RECOVERY  8/12/2011    Performed by RICKY, IR-RECOVERY at SURGERY SAME DAY [facility]   • VITRECTOMY POSTERIOR  1/18/2011    Performed by NAHUM GE at SURGERY SAME DAY [facility]   • SCLERAL BUCKLING  1/18/2011    Performed by NAHUM GE at SURGERY SAME DAY [facility]   • AV FISTULOGRAM  9/17/2010    Performed by ALESHIA MOSCOSO at SURGERY [facility]   • ANGIOPLASTY BALLOON  9/17/2010    Performed by ALESHIA MOSCOSO at SURGERY [facility]   • AV FISTULOGRAM  7/23/2010    Performed by ALESHIA MOSCOSO at SURGERY [facility]   • ANGIOPLASTY BALLOON  7/23/2010    Performed by ALESHIA MOSCOSO at SURGERY [facility]   • AV FISTULA REVISION  2/19/2010    Performed by WILLIE HATCH at SURGERY [facility]   • AV FISTULA CREATION  2/12/2010    Performed by ALESHIA MOSCOSO at SURGERY [facility]   • CATARACT PHACO WITH IOL  4/8/08    Performed by STACEY PHILLIPS at SURGERY SAME DAY [facility]   • OTHER ORTHOPEDIC SURGERY  1998    right toe for facititis     Family History   Problem Relation Age of Onset   • Stroke Mother    • Hypertension Mother    • Lung Disease Father         Emphysema, resp failure   • Genitourinary () Problems Maternal Aunt         hematuria   • Hypertension Brother      Social History     Socioeconomic History   • Marital status:      Spouse name: Not on file   • Number of children: Not on file   • Years of education: Not on file   • Highest education level: Not on file   Occupational History   • Not on file   Social Needs   • Financial resource strain: Not on file   • Food insecurity:     Worry: Not on file     Inability: Not on file   • Transportation needs:     Medical: Not on file     Non-medical: Not on file   Tobacco Use   • Smoking status: Former Smoker     Packs/day: 1.00     Years: 40.00     Pack years: 40.00     Types: Cigarettes      Last attempt to quit: 1/1/2009     Years since quitting: 10.8   • Smokeless tobacco: Never Used   • Tobacco comment: 1 pk a day for 35 yrs, QUIT JAN 1 2010   Substance and Sexual Activity   • Alcohol use: No     Alcohol/week: 0.0 oz   • Drug use: No   • Sexual activity: Never     Partners: Female     Comment: , two sons, 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   Lifestyle   • Physical activity:     Days per week: Not on file     Minutes per session: Not on file   • Stress: Not on file   Relationships   • Social connections:     Talks on phone: Not on file     Gets together: Not on file     Attends [employer] service: Not on file     Active member of club or organization: Not on file     Attends meetings of clubs or organizations: Not on file     Relationship status: Not on file   • Intimate partner violence:     Fear of current or ex partner: Not on file     Emotionally abused: Not on file     Physically abused: Not on file     Forced sexual activity: Not on file   Other Topics Concern   • Not on file   Social History Narrative   • Not on file     No Known Allergies    Current Outpatient Medications   Medication Sig Dispense Refill   • Non Formulary Request 2 liters of Oxygen at home     • warfarin (COUMADIN) 5 MG Tab 1.5 tablets  Tonight = 7.5 mg then 1 tab 5mg every evening 45 Tab 0   • gabapentin (NEURONTIN) 300 MG Cap Take 1 Cap by mouth 3 times a day.     • torsemide (DEMADEX) 10 MG tablet TAKE THREE TABLETS BY MOUTH DAILY 30 Tab 2   • albuterol 108 (90 Base) MCG/ACT Aero Soln inhalation aerosol Inhale 2 Puffs by mouth every four hours as needed. 8.5 g 0   • tamsulosin (FLOMAX) 0.4 MG capsule Take 1 Cap by mouth ONE-HALF HOUR AFTER BREAKFAST. 90 Cap 0   • omeprazole (PRILOSEC) 40 MG delayed-release capsule Take 40 mg by mouth every morning.     • calcium acetate (PHOS-LO) 667 MG Cap Take 1,334 mg by mouth 3 times a day, with meals.     • levalbuterol (XOPENEX) 1.25 MG/3ML Nebu Soln 3 mL by Nebulization route every  "four hours as needed for Shortness of Breath. 120 Bullet 11   • pravastatin (PRAVACHOL) 20 MG Tab Take 20 mg by mouth every evening.       No current facility-administered medications for this visit.      Review of Systems   Constitutional: Negative for chills, diaphoresis and fever.   Respiratory: Positive for shortness of breath. Negative for cough.    Cardiovascular: Negative for chest pain, palpitations, orthopnea, claudication, leg swelling and PND.   Gastrointestinal: Negative for abdominal pain and blood in stool.   Genitourinary: Negative for hematuria.   Neurological: Negative for dizziness and loss of consciousness.     All others systems reviewed and negative.   Objective:     /50 (BP Location: Left arm, Patient Position: Sitting, BP Cuff Size: Adult)   Pulse 75   Ht 1.727 m (5' 8\")   Wt 66.2 kg (146 lb)   SpO2 93%  Body mass index is 22.2 kg/m².    Physical Exam   Constitutional: He is oriented to person, place, and time. No distress.   Neck: No JVD present.   Cardiovascular: Normal rate and regular rhythm.   Murmur heard.  Pulses:       Radial pulses are 2+ on the right side and 2+ on the left side.   Pulmonary/Chest: Effort normal. No respiratory distress. He has decreased breath sounds in the right lower field and the left lower field. He has wheezes. He has no rales. He exhibits no tenderness.   Musculoskeletal:         General: No edema.   Neurological: He is alert and oriented to person, place, and time.   Skin: Skin is warm and dry. He is not diaphoretic. No erythema.   Right AV fistula-bruit/thrill   Psychiatric: Mood, memory, affect and judgment normal.   Nursing note and vitals reviewed.    Cardiac Imaging and Procedures Review:    EKG 11/14/2019: sinus rhythm, RBBB, 1st degree AVB    Echocardiogram (08/28/2019):   Normal left ventricular systolic function. Left ventricular ejection fraction is visually estimated to be 60%.   No evidence of left atrial appendage thrombus, sludge or " spontaneous echo contrast.  Left atrial appendage emptying velocities approximately 20 cm/s.    RFA-Procedural Conclusions per Dr. Grissom's Op Note (10/23/2019):  [facility] EP PROCEDURE NOTE  Procedure(s) Performed:   Supraventricular tachycardia ablation (atrial flutter ablation)   Electrophysiology Study  Electroanatomical 3D mapping  CS/LA pace and record  Programmed stimulation with IV drug infusion  Indication(s):  Typical atrial flutter  : Karon Grissom M.D.  Baseline Rhythm:   Sinus CL 1181 msec  Intervals:   msec   HV 55 msec  AVBCL 320 msec.   Mapping:  Electroanatomical 3D (CARTO FAM) map of CS and right atrium around the cavotricuspid isthmus was created during CS pacing.  Ablation:  Radiofrequency energy was applied using 3.5 mmsaline irrigated catheter, power 40 W, total 9 RF applications, RF time 354 seconds to create a cavotricuspid isthmus line between the tricuspid annulus and Eustachian ridge/inferior vena cava to produce bidirectional isthmus conduction block.  Post Ablation Testin. No inducible arrhythmia with CS pacing post ablation. This was done with burst pacing down to 280 msec along with single and double atrial extra-stimuli down to AVNERP, on/off isuprel infusion at 4 mcg/min.  2. Trans-isthmus conduction time pacing from proximal CS >200 msec.  3. Trans-isthmus conduction time pacing from lateral to the isthmus line >200 msec.  Fluoroscopy Time: 1.4 minutes  Impressions/Plan:   1. History of typical right atrial flutter.  2. Successful catheter mediated ablation of right atrial flutter.  3. Admit to monitored bed overnight.    Labs (personally reviewed and notable for):   Lab Results   Component Value Date/Time    SODIUM 139 10/27/2019 03:24 AM    POTASSIUM 4.8 10/27/2019 03:24 AM    CHLORIDE 98 10/27/2019 03:24 AM    CO2 24 10/27/2019 03:24 AM    GLUCOSE 94 10/27/2019 03:24 AM    BUN 54 (H) 10/27/2019 03:24 AM    CREATININE 6.41 (HH) 10/27/2019 03:24 AM     CREATININE 2.1 (H) 05/06/2009 10:08 AM      Lab Results   Component Value Date/Time    WBC 5.3 10/27/2019 03:24 AM    RBC 2.99 (L) 10/27/2019 03:24 AM    HEMOGLOBIN 8.9 (L) 10/27/2019 03:24 AM    HEMATOCRIT 29.4 (L) 10/27/2019 03:24 AM    MCV 98.3 (H) 10/27/2019 03:24 AM    MCH 29.8 10/27/2019 03:24 AM    MCHC 30.3 (L) 10/27/2019 03:24 AM    MPV 10.4 10/27/2019 03:24 AM    NEUTSPOLYS 78.80 (H) 10/27/2019 03:24 AM    LYMPHOCYTES 9.00 (L) 10/27/2019 03:24 AM    MONOCYTES 5.80 10/27/2019 03:24 AM    EOSINOPHILS 5.60 10/27/2019 03:24 AM    EOSINOPHILS 2 10/26/2019 05:55 PM    BASOPHILS 0.20 10/27/2019 03:24 AM    HYPOCHROMIA 1+ 09/14/2019 05:58 AM    ANISOCYTOSIS 1+ 10/24/2019 08:58 AM      PT/INR:   Lab Results   Component Value Date/Time    PROTHROMBTM 29.1 (H) 11/13/2019 12:45 PM    INR 2.63 (H) 11/13/2019 12:45 PM   ]  Assessment:     1. Typical atrial flutter (HCC)  EKG   2. S/P ablation of atrial flutter     3. Chronic anticoagulation     4. ESRD on dialysis (HCC)     5. HELGA (obstructive sleep apnea)     6. Pleural effusion       Medical Decision Making:  Today's Assessment / Status / Plan:   1. Typical Atrial Flutter  - s/p RFA of right atrial flutter by Dr. Grissom on 10/23/19.  - Per echo (08/2019) shows recovered LV function, EF 60%.  - Asymptomatic, denies recurrence, currently in sinus rhythm, BP stable.  - Off betablocker. Confirmed with Dr. Mendoza  - On OAC with Coumadin, no s/sx bleeding, monitored by anticoagulation clinic, continue.  - Reports continue dyspnea, appears euvolemic, weight down. Chest X-ray shows moderate plural effusion. Recent right thoracentesis 10/26/19 with 1050 cc. Patient to see pulmonology Dr. Fisher this afternoon.   - From arrhythmia perspective, patient is doing well s/p RFA without recurrence.  Will continue to monitor.   - Patient encouraged to monitor HRs and call office if abnormal.     2. ESRD  - HD M,W,F  - Followed up [facility]    3. Chronic Anticoagulation:  Coumadin    Plan reviewed in detail with the patient and he verbalizes understanding and is in agreement. RTC 6-8 weeks with EP and with Dr. Mendoza as previously scheduled, sooner if clinical condition changes.     Thank you for allowing me to participate in this patients care.  Please contact me with any questions or concerns.     DESIREE Jiang.   [facility]  [phone]    Collaborating MD/ADD: MD Diane.

## 2019-11-12 NOTE — PROGRESS NOTES
"Chief Complaint   Patient presents with   • Atrial Flutter     F/V: 1 WEEK       Subjective:   Ronny Castelan is a 79 y.o. male who presents today for evaluation in heart failure clinic because of recent admission with Bryant nod secondary to a large pericardial effusion.  He does have a history of atrial flutter and has been considered for ablation.  His ejection fraction in June was 30% on JUSTIN.  However, during his recent admission it was 55 to 65%.  He was discharged on amiodarone therapy and warfarin anticoagulation.  He is now post a flutter ablation.    His activity level is limited but his dyspnea on exertion has improved post a flutter ablation and thoracentesis.  He has had no PND, orthopnea or edema.  He denies any chest discomfort, palpitations or lightheadedness.    Past Medical History:   Diagnosis Date   • Anesthesia     \"needs more sedation\" (can sometimes hear MD during procedure)    • Anticoagulation monitoring, special range    • Aortic stenosis     mod AS- concern for low flow severe AS with rEFof 30%   • Arterial leg ulcer ([facility]) 11/8/2018   • Atrial flutter ([facility]) 6/12/2019   • Basal cell carcinoma of left cheek 3/26/2015   • BPH 7/14/2009   • Bronchitis 12/25/2018   • CAD (coronary artery disease) 2/20/2014   • CATARACT     sanjuanita surgery complete   • CHF (congestive heart failure), NYHA class II, chronic, systolic ([facility]) E F30 in setting of atrial flutter 6/13/2019   • Chronic respiratory failure with hypoxia ([facility]) 5/8/2016   • CKD (chronic kidney disease) stage 4, GFR 15-29 ml/min ([facility]) 1/15/2010    end stage renal disease   • COPD (chronic obstructive pulmonary disease) ([facility])     wears 2.5 L o2 sometimes when he sleeps   • Detached retina    • [facility]   • EMPHYSEMA    • Glaucoma     Early stage   • Gout    • Heart burn     occas   • Heart murmur     maria esther lee cardiologist   • Hypertension    • Indigestion     occas   • Kidney cyst    • Leg pain, bilateral " 8/10/2015   • On supplemental oxygen therapy    • Peripheral vascular disease (HCC) 8/10/2015   • Pneumonia    • Primary insomnia 3/22/2018   • Proteinuria    • PVD (peripheral vascular disease) ([facility])    • Sleep apnea     O2 PER CANULA  AT NIGHT 2l/m     Past Surgical History:   Procedure Laterality Date   • AV FISTULA CREATION Right 8/6/2019    Procedure: CREATION, AV FISTULA -  RIGHT ARM  ,  and Ligation of Left Arm Fistula;  Surgeon: Sera Peters M.D.;  Location: [facility];  Service: General   • CATH PLACEMENT Right 8/6/2019    Procedure: INSERTION, CATHETER - PERMA ,;  Surgeon: Sera Peters M.D.;  Location: [facility];  Service: General   • JUSTIN  6/13/2019    Procedure: ECHOCARDIOGRAM, TRANSESOPHAGEAL;  Surgeon: Harvinder Hoang M.D.;  Location: [facility];  Service: Cardiac   • CARDIOVERSION  6/13/2019    Procedure: CARDIOVERSION;  Surgeon: Harvinder Hoang M.D.;  Location: [facility];  Service: Cardiac   • AV FISTULA CREATION Right 2/21/2019    Procedure: AV FISTULA CREATION - ARM;  Surgeon: David Cason M.D.;  Location: [facility];  Service: General   • FEMORAL ENDARTERECTOMY Left 1/25/2019    Procedure: FEMORAL ENDARTERECTOMY;  Surgeon: David Cason M.D.;  Location: [facility];  Service: General   • FEMORAL POPLITEAL BYPASS Left 1/25/2019    Procedure: LEFT FEMORAL POPLITEAL POLYTETRAFLUOROETHYLENE ePTFE(PROPATEN VASCULAR GRAFT) BYPASS;  Surgeon: David Cason M.D.;  Location: [facility];  Service: General   • ANGIOGRAM Left 1/25/2019    Procedure: LEFT LEG ANGIOGRAM;  Surgeon: David Cason M.D.;  Location: [facility];  Service: General   • ENDOSCOPY PROCEDURE  3/21/2018    Procedure: ENDOSCOPY PROCEDURE/UPPER;  Surgeon: Enrique Child D.O.;  Location: [facility];  Service: Gastroenterology   • COLONOSCOPY - ENDO  8/15/2016    Procedure: COLONOSCOPY -  ENDO;  Surgeon: Mane Whatley M.D.;  Location: ENDOSCOPY [facility];  Service:    • GASTROSCOPY WITH BIOPSY  8/13/2016    Procedure: GASTROSCOPY WITH BIOPSY;  Surgeon: Jorge Leavitt M.D.;  Location: ENDOSCOPY [facility];  Service:    • RECOVERY  12/23/2015    Procedure: VASCULAR CASE-CASON-LEFT ARM FISTULOGRAM WITH ANGIOPLASTY;  Surgeon: Recoveryonly Surgery;  Location: SURGERY PRE-POST PROC UNIT [facility];  Service:    • RECOVERY  3/24/2015    Performed by [facility] at SURGERY PRE-POST PROC UNIT [facility]   • RECOVERY  7/29/2014    Performed by [facility] at SURGERY SAME DAY [facility]   • RECOVERY  3/24/2014    Performed by [facility] at SURGERY [facility]   • RECOVERY  12/17/2013    Performed by [facility] at SURGERY SAME DAY [facility]   • RECOVERY  7/2/2013    Performed by [facility] at SURGERY SAME DAY [facility]   • AV FISTULA THROMBOLYSIS  7/2/2013    Performed by David Cason M.D. at SURGERY [facility]   • RECOVERY  1/29/2013    Performed by [facility] at SURGERY SAME DAY [facility]   • RECOVERY  7/23/2012    Performed by SURGERY, IR-RECOVERY at SURGERY SAME DAY [facility]   • VITRECTOMY POSTERIOR  10/11/2011    Performed by NAHUM GE at SURGERY SAME DAY [facility]   • RECOVERY  8/12/2011    Performed by SURGERY, IR-RECOVERY at SURGERY SAME DAY [facility]   • VITRECTOMY POSTERIOR  1/18/2011    Performed by NAHUM GE at SURGERY SAME DAY [facility]   • SCLERAL BUCKLING  1/18/2011    Performed by NAHUM GE at SURGERY SAME DAY [facility]   • AV FISTULOGRAM  9/17/2010    Performed by DAVID CASON at SURGERY [facility]   • ANGIOPLASTY BALLOON  9/17/2010    Performed by DAVID CASON at SURGERY [facility]   • AV FISTULOGRAM  7/23/2010    Performed by DAVID CASON at SURGERY [facility]   • ANGIOPLASTY BALLOON  7/23/2010    Performed by DAVID CASON at SURGERY  [facility]S   • AV FISTULA REVISION  2/19/2010    Performed by WILLIE HATCH at SURGERY [facility]   • AV FISTULA CREATION  2/12/2010    Performed by ALESHIA MOSCOSO at SURGERY [facility]   • CATARACT PHACO WITH IOL  4/8/08    Performed by STACEY PHILLIPS at SURGERY SAME DAY [facility]   • OTHER ORTHOPEDIC SURGERY  1998    right toe for facititis     Family History   Problem Relation Age of Onset   • Stroke Mother    • Hypertension Mother    • Lung Disease Father         Emphysema, resp failure   • Genitourinary () Problems Maternal Aunt         hematuria   • Hypertension Brother      Social History     Socioeconomic History   • Marital status:      Spouse name: Not on file   • Number of children: Not on file   • Years of education: Not on file   • Highest education level: Not on file   Occupational History   • Not on file   Social Needs   • Financial resource strain: Not on file   • Food insecurity:     Worry: Not on file     Inability: Not on file   • Transportation needs:     Medical: Not on file     Non-medical: Not on file   Tobacco Use   • Smoking status: Former Smoker     Packs/day: 1.00     Years: 40.00     Pack years: 40.00     Types: Cigarettes     Last attempt to quit: 1/1/2009     Years since quitting: 10.8   • Smokeless tobacco: Never Used   • Tobacco comment: 1 pk a day for 35 yrs, QUIT JAN 1 2010   Substance and Sexual Activity   • Alcohol use: No     Alcohol/week: 0.0 oz   • Drug use: No   • Sexual activity: Never     Partners: Female     Comment: , two sons, 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   Lifestyle   • Physical activity:     Days per week: Not on file     Minutes per session: Not on file   • Stress: Not on file   Relationships   • Social connections:     Talks on phone: Not on file     Gets together: Not on file     Attends Denominational service: Not on file     Active member of club or organization: Not on file     Attends meetings of clubs or organizations: Not  "on file     Relationship status: Not on file   • Intimate partner violence:     Fear of current or ex partner: Not on file     Emotionally abused: Not on file     Physically abused: Not on file     Forced sexual activity: Not on file   Other Topics Concern   • Not on file   Social History Narrative   • Not on file     No Known Allergies  Outpatient Encounter Medications as of 11/12/2019   Medication Sig Dispense Refill   • Non Formulary Request 2 liters of Oxygen at home     • warfarin (COUMADIN) 5 MG Tab 1.5 tablets  Tonight = 7.5 mg then 1 tab 5mg every evening 45 Tab 0   • ROPINIRole (REQUIP) 0.5 MG Tab TAKE TWO TABLETS BY MOUTH DAILY 60 Tab 3   • torsemide (DEMADEX) 10 MG tablet TAKE THREE TABLETS BY MOUTH DAILY 30 Tab 2   • albuterol 108 (90 Base) MCG/ACT Aero Soln inhalation aerosol Inhale 2 Puffs by mouth every four hours as needed. 8.5 g 0   • tamsulosin (FLOMAX) 0.4 MG capsule Take 1 Cap by mouth ONE-HALF HOUR AFTER BREAKFAST. 90 Cap 0   • omeprazole (PRILOSEC) 40 MG delayed-release capsule Take 40 mg by mouth every morning.     • calcium acetate (PHOS-LO) 667 MG Cap Take 1,334 mg by mouth 3 times a day, with meals.     • levalbuterol (XOPENEX) 1.25 MG/3ML Nebu Soln 3 mL by Nebulization route every four hours as needed for Shortness of Breath. 120 Bullet 11   • pravastatin (PRAVACHOL) 20 MG Tab Take 20 mg by mouth every evening.     • gabapentin (NEURONTIN) 300 MG Cap Take 1 Cap by mouth 3 times a day.     • metoprolol (LOPRESSOR) 50 MG Tab Take 0.5 Tabs by mouth 2 Times a Day. 180 Tab 3     No facility-administered encounter medications on file as of 11/12/2019.      ROS       Objective:   /64 (BP Location: Left arm, Patient Position: Sitting, BP Cuff Size: Adult)   Pulse 78   Ht 1.727 m (5' 8\")   Wt 66.2 kg (146 lb)   SpO2 93%   BMI 22.20 kg/m²     Physical Exam   Constitutional: He appears well-developed and well-nourished.   Neck: No JVD present.   Cardiovascular: Normal rate and regular " rhythm.  No extrasystoles are present.   Murmur (3/6 systolic at the base) heard.  Pulmonary/Chest: Effort normal. He has decreased breath sounds in the right lower field. He has wheezes. He has no rales.   Abdominal: Soft. There is no tenderness.   Musculoskeletal:         General: No edema.      Lab Results   Component Value Date/Time    CHOLSTRLTOT 90 (L) 06/22/2017 07:55 AM    LDL 26 06/22/2017 07:55 AM    HDL 57 06/22/2017 07:55 AM    TRIGLYCERIDE 37 06/22/2017 07:55 AM       Lab Results   Component Value Date/Time    SODIUM 139 10/27/2019 03:24 AM    POTASSIUM 4.8 10/27/2019 03:24 AM    CHLORIDE 98 10/27/2019 03:24 AM    CO2 24 10/27/2019 03:24 AM    GLUCOSE 94 10/27/2019 03:24 AM    BUN 54 (H) 10/27/2019 03:24 AM    CREATININE 6.41 (HH) 10/27/2019 03:24 AM    CREATININE 2.1 (H) 05/06/2009 10:08 AM     Lab Results   Component Value Date/Time    ALKPHOSPHAT 114 (H) 10/27/2019 03:24 AM    ASTSGOT 16 10/27/2019 03:24 AM    ALTSGPT 10 10/27/2019 03:24 AM    TBILIRUBIN 0.5 10/27/2019 03:24 AM      Lab Results   Component Value Date/Time    BNPBTYPENAT 1583 (H) 04/15/2017 04:15 PM              Echocardiography Laboratory    CONCLUSIONS  Compared to the images of the study done 8/1/19 - there has been   resolution of pericardial effusion.  Left ventricular ejection fraction is visually estimated to be 55%.  Moderate to severe aortic stenosis.Vmax is 3.50  m/s.  Transvalvular gradients are - Peak: 49 mmHg, Mean:  30 mmHg.  Dimensionless index is 0.2.  Right ventricular systolic pressure is estimated to be 55 mmHg.      EDVIN GREEN  Exam Date:         08/02/2019     Limited echocardiogram from yesterday:  Pericardium  Normal pericardium without effusion.    Transthoracic  Echo Report        Echocardiography Laboratory     CONCLUSIONS  Prior Echo - 8/2/19, AS is now severe.  Left ventricular ejection fraction is visually estimated to be 60%.  Mild concentric left ventricular hypertrophy.  Grade II diastolic  dysfunction.  Severely dilated left atrium.  Mild mitral regurgitation.  Severe aortic stenosis: Vmax is 4.28 m/s, MG 49 mmHg.  Mild tricuspid regurgitation.  Estimated right ventricular systolic pressure  is 50 mmHg.  Right atrial pressure is estimated to be 8 mmHg.     .     EDVIN CASTELAN  Exam Date:         10/24/2019    Assessment:     1. Severe aortic stenosis     2. Essential hypertension     3. Dyslipidemia     4. Elevated coronary artery calcium score     5. Chronic anticoagulation     6. ESRD (end stage renal disease) on dialysis (HCC)         Medical Decision Making:  Today's Assessment / Status / Plan:     Mr. Castelan has had progression of his aortic stenosis.  He does have an appointment with [facility] for consideration of TAVR.  He was turned down by our valve clinic.  He is also going to follow-up with pulmonary in a couple of days.  Given the reduced breath sounds at the right base and his prior thoracentesis, we will obtain a chest x-ray prior to that appointment.  He will follow-up in about a month.

## 2019-11-12 NOTE — PATIENT INSTRUCTIONS
-Paint your toe eschar with Betadine daily, let the Betadine dry before covering with gauze.    -Keep dressings clean and dry. Change dressings if they become over saturated, soiled or fall off.     -Should you experience any significant changes in your wound(s), such as infection (redness, swelling, localized heat, increased pain, fever > 101 F, chills) or have any questions regarding your home care instructions, please contact the wound center at [phone]. If after hours, contact your primary care physician or go to the hospital emergency room.

## 2019-11-13 ENCOUNTER — HOSPITAL ENCOUNTER (OUTPATIENT)
Dept: LAB | Facility: MEDICAL CENTER | Age: 79
End: 2019-11-13
Attending: NURSE PRACTITIONER
Payer: MEDICARE

## 2019-11-13 DIAGNOSIS — I48.92 ATRIAL FLUTTER, UNSPECIFIED TYPE (HCC): ICD-10-CM

## 2019-11-13 LAB
INR PPP: 2.63 (ref 0.87–1.13)
PROTHROMBIN TIME: 29.1 SEC (ref 12–14.6)

## 2019-11-13 PROCEDURE — 85610 PROTHROMBIN TIME: CPT

## 2019-11-13 PROCEDURE — 36415 COLL VENOUS BLD VENIPUNCTURE: CPT

## 2019-11-14 ENCOUNTER — ANTICOAGULATION MONITORING (OUTPATIENT)
Dept: VASCULAR LAB | Facility: MEDICAL CENTER | Age: 79
End: 2019-11-14

## 2019-11-14 ENCOUNTER — TELEPHONE (OUTPATIENT)
Dept: CARDIOLOGY | Facility: MEDICAL CENTER | Age: 79
End: 2019-11-14

## 2019-11-14 ENCOUNTER — OFFICE VISIT (OUTPATIENT)
Dept: PULMONOLOGY | Facility: HOSPICE | Age: 79
End: 2019-11-14
Payer: MEDICARE

## 2019-11-14 ENCOUNTER — OFFICE VISIT (OUTPATIENT)
Dept: CARDIOLOGY | Facility: MEDICAL CENTER | Age: 79
End: 2019-11-14
Payer: MEDICARE

## 2019-11-14 VITALS
HEART RATE: 75 BPM | DIASTOLIC BLOOD PRESSURE: 50 MMHG | OXYGEN SATURATION: 93 % | WEIGHT: 146 LBS | HEIGHT: 68 IN | BODY MASS INDEX: 22.13 KG/M2 | SYSTOLIC BLOOD PRESSURE: 142 MMHG

## 2019-11-14 VITALS
BODY MASS INDEX: 22.13 KG/M2 | HEIGHT: 68 IN | SYSTOLIC BLOOD PRESSURE: 154 MMHG | DIASTOLIC BLOOD PRESSURE: 60 MMHG | WEIGHT: 146 LBS | OXYGEN SATURATION: 92 % | HEART RATE: 73 BPM

## 2019-11-14 DIAGNOSIS — Z98.890 S/P ABLATION OF ATRIAL FLUTTER: ICD-10-CM

## 2019-11-14 DIAGNOSIS — Z79.01 CHRONIC ANTICOAGULATION: Chronic | ICD-10-CM

## 2019-11-14 DIAGNOSIS — G47.33 OSA (OBSTRUCTIVE SLEEP APNEA): ICD-10-CM

## 2019-11-14 DIAGNOSIS — J94.8 TRANSUDATIVE PLEURAL EFFUSION: ICD-10-CM

## 2019-11-14 DIAGNOSIS — J90 PLEURAL EFFUSION: ICD-10-CM

## 2019-11-14 DIAGNOSIS — I48.3 TYPICAL ATRIAL FLUTTER (HCC): Primary | ICD-10-CM

## 2019-11-14 DIAGNOSIS — Z86.79 HISTORY OF ATRIAL FLUTTER: ICD-10-CM

## 2019-11-14 DIAGNOSIS — R06.02 SOB (SHORTNESS OF BREATH): ICD-10-CM

## 2019-11-14 DIAGNOSIS — Z86.79 S/P ABLATION OF ATRIAL FLUTTER: ICD-10-CM

## 2019-11-14 DIAGNOSIS — R05.3 CHRONIC COUGH: ICD-10-CM

## 2019-11-14 DIAGNOSIS — N18.6 ESRD ON DIALYSIS (HCC): ICD-10-CM

## 2019-11-14 DIAGNOSIS — Z99.2 ESRD ON DIALYSIS (HCC): ICD-10-CM

## 2019-11-14 LAB — EKG IMPRESSION: NORMAL

## 2019-11-14 PROCEDURE — 99214 OFFICE O/P EST MOD 30 MIN: CPT | Performed by: INTERNAL MEDICINE

## 2019-11-14 PROCEDURE — 99214 OFFICE O/P EST MOD 30 MIN: CPT | Performed by: NURSE PRACTITIONER

## 2019-11-14 PROCEDURE — 93000 ELECTROCARDIOGRAM COMPLETE: CPT | Performed by: INTERNAL MEDICINE

## 2019-11-14 ASSESSMENT — ENCOUNTER SYMPTOMS
NAUSEA: 0
SENSORY CHANGE: 0
SINUS PAIN: 0
SORE THROAT: 0
DIARRHEA: 0
HEADACHES: 0
COUGH: 1
PSYCHIATRIC NEGATIVE: 1
STRIDOR: 0
DIZZINESS: 0
SHORTNESS OF BREATH: 1
SHORTNESS OF BREATH: 1
LOSS OF CONSCIOUSNESS: 0
EYE DISCHARGE: 0
EYE PAIN: 0
CHILLS: 0
SPUTUM PRODUCTION: 0
ORTHOPNEA: 0
ABDOMINAL PAIN: 0
WEIGHT LOSS: 0
VOMITING: 0
WHEEZING: 0
FEVER: 0
FOCAL WEAKNESS: 0
MYALGIAS: 0

## 2019-11-14 NOTE — TELEPHONE ENCOUNTER
Returned pt call and encouraged pt to keep [facility] with ED for post ablation.  He verbalizes understanding and states no other concerns or questions at this time.  Pt is appreciative of information given.

## 2019-11-14 NOTE — LETTER
"     [facility]   [address]  [place] [address]  Phone: [phone]  Fax: [phone]              Parmjit Castelan  1940    Encounter Date: 11/14/2019    ILAN Jiang          PROGRESS NOTE:  Cardiology/Electrophysiology Follow-up Note    Subjective:   Chief Complaint:   Chief Complaint   Patient presents with   • Atrial Flutter     FV   • Evaluation Of Medication Change     Andreina Castelan is a 79 y.o. male who presents today for follow up for typical atrial flutter s/p RFA by Dr. Grissom on 10/23/19.     He is followed by Dr. Crum and Dr. Grissom.  Last seen by Dr. Mendoza on 11/12/19. Chest xray was ordered for continue dyspnea. His metoprolol was discontinued.     Recent hospitalization on 10/24/19 s/p RFA for acute on chronic respiratory failure/ESRD. He underwent right thoracentesis with 1050 cc removed.     Medical history significant for typical atrial flutter s/p RFA by Dr. Grissom on 10/23/19, on chronic anticoagulation with Coumadin, ESRD with right AV fistula and dialysis MWF, CAD (no interventions), CHF, Hypertension, hyperlipidemia, PVD with chronic claudication, BPH, HELGA- not on CPAP, COPD on oxygen as needed, gout, and severe AS.    He presents today in follow up with his wife, he states he is doing ok.  Reports continued dyspnea.   He denies chest pain, dizziness, palpitations, pre syncope or syncope, or lower extremity edema. Denies any signs or symptoms of bleeding or bleeding issues. Patient endorses medication compliance.     He is ESRD, receiving HD M,W,F. On coumadin, monitored by anticoagulation clinic.  Followed by Dr. Peters for his peripheral vascular disease. Scheduled for consult with Dr. Godoy at [facility] for second opinion for aortic stenosis.     Past Medical History:   Diagnosis Date   • Anesthesia     \"needs more sedation\" (can sometimes hear MD during procedure)    • Anticoagulation monitoring, special " range    • Aortic stenosis     mod AS- concern for low flow severe AS with rEFof 30%   • Arterial leg ulcer ([facility]) 11/8/2018   • Atrial flutter ([facility]) 6/12/2019   • Basal cell carcinoma of left cheek 3/26/2015   • BPH 7/14/2009   • Bronchitis 12/25/2018   • CAD (coronary artery disease) 2/20/2014   • CATARACT     sanjuanita surgery complete   • CHF (congestive heart failure), NYHA class II, chronic, systolic ([facility]) E F30 in setting of atrial flutter 6/13/2019   • Chronic respiratory failure with hypoxia ([facility]) 5/8/2016   • CKD (chronic kidney disease) stage 4, GFR 15-29 ml/min ([facility]) 1/15/2010    end stage renal disease   • COPD (chronic obstructive pulmonary disease) ([facility])     wears 2.5 L o2 sometimes when he sleeps   • Detached retina    • Dialysis     m,w,f [facility]   • EMPHYSEMA    • Glaucoma     Early stage   • Gout    • Heart burn     occas   • Heart murmur     maria esther lee cardiologist   • Hypertension    • Indigestion     occas   • Kidney cyst    • Leg pain, bilateral 8/10/2015   • On supplemental oxygen therapy    • Peripheral vascular disease ([facility]) 8/10/2015   • Pneumonia    • Primary insomnia 3/22/2018   • Proteinuria    • PVD (peripheral vascular disease) ([facility])    • Sleep apnea     O2 PER CANULA  AT NIGHT 2l/m     Past Surgical History:   Procedure Laterality Date   • AV FISTULA CREATION Right 8/6/2019    Procedure: CREATION, AV FISTULA -  RIGHT ARM  ,  and Ligation of Left Arm Fistula;  Surgeon: Sera Peters M.D.;  Location: [facility];  Service: General   • CATH PLACEMENT Right 8/6/2019    Procedure: INSERTION, CATHETER - PERMA ,;  Surgeon: Sera Peters M.D.;  Location: [facility];  Service: General   • JUSTIN  6/13/2019    Procedure: ECHOCARDIOGRAM, TRANSESOPHAGEAL;  Surgeon: Harvinder Hoang M.D.;  Location: [facility];  Service: Cardiac   • CARDIOVERSION  6/13/2019    Procedure: CARDIOVERSION;  Surgeon: Harvinder Hoang M.D.;  Location:  SURGERY [facility];  Service: Cardiac   • AV FISTULA CREATION Right 2/21/2019    Procedure: AV FISTULA CREATION - ARM;  Surgeon: David Cason M.D.;  Location: SURGERY [facility];  Service: General   • FEMORAL ENDARTERECTOMY Left 1/25/2019    Procedure: FEMORAL ENDARTERECTOMY;  Surgeon: David Cason M.D.;  Location: SURGERY [facility];  Service: General   • FEMORAL POPLITEAL BYPASS Left 1/25/2019    Procedure: LEFT FEMORAL POPLITEAL POLYTETRAFLUOROETHYLENE ePTFE(PROPATEN VASCULAR GRAFT) BYPASS;  Surgeon: David Cason M.D.;  Location: SURGERY [facility];  Service: General   • ANGIOGRAM Left 1/25/2019    Procedure: LEFT LEG ANGIOGRAM;  Surgeon: David Cason M.D.;  Location: SURGERY [facility];  Service: General   • ENDOSCOPY PROCEDURE  3/21/2018    Procedure: ENDOSCOPY PROCEDURE/UPPER;  Surgeon: Enrique Child D.O.;  Location: SURGERY [facility];  Service: Gastroenterology   • COLONOSCOPY - ENDO  8/15/2016    Procedure: COLONOSCOPY - ENDO;  Surgeon: Mane Whatley M.D.;  Location: ENDOSCOPY [facility];  Service:    • GASTROSCOPY WITH BIOPSY  8/13/2016    Procedure: GASTROSCOPY WITH BIOPSY;  Surgeon: Jorge Leavitt M.D.;  Location: ENDOSCOPY [facility];  Service:    • RECOVERY  12/23/2015    Procedure: VASCULAR CASE-CASON-LEFT ARM FISTULOGRAM WITH ANGIOPLASTY;  Surgeon: Recoveryonbhavani Surgery;  [place] [facility]  [place]:    • RECOVERY  3/24/2015    Performed by Recoveryonly Surgery at [place] [facility] [place]   • RECOVERY  7/29/2014    Performed by Ir-Recovery Surgery at SURGERY SAME DAY [facility]   • RECOVERY  3/24/2014    Performed by Ir-Recovery Surgery at SURGERY [facility]   • RECOVERY  12/17/2013    Performed by Ir-Recovery Surgery at SURGERY SAME DAY [facility]   • RECOVERY  7/2/2013    Performed by Ir-Recovery Surgery at SURGERY SAME DAY [facility]   • AV FISTULA THROMBOLYSIS  7/2/2013    Performed by  David Cason M.D. at SURGERY [facility]   • RECOVERY  1/29/2013    Performed by Ir-Recovery Surgery at SURGERY SAME DAY [facility]   • RECOVERY  7/23/2012    Performed by SURGERY, IR-RECOVERY at SURGERY SAME DAY [facility]   • VITRECTOMY POSTERIOR  10/11/2011    Performed by NAHUM GE at SURGERY SAME DAY [facility]   • RECOVERY  8/12/2011    Performed by SURGERY, IR-RECOVERY at SURGERY SAME DAY [facility]   • VITRECTOMY POSTERIOR  1/18/2011    Performed by NAHUM GE at SURGERY SAME DAY [facility]   • SCLERAL BUCKLING  1/18/2011    Performed by NAHUM GE at SURGERY SAME DAY [facility]   • AV FISTULOGRAM  9/17/2010    Performed by DAVID CASON at SURGERY [facility]   • ANGIOPLASTY BALLOON  9/17/2010    Performed by DAVID CASON at SURGERY [facility]S   • AV FISTULOGRAM  7/23/2010    Performed by DAVID CASON at SURGERY [facility] ORS   • ANGIOPLASTY BALLOON  7/23/2010    Performed by DAVID CASON at SURGERY [facility] ORS   • AV FISTULA REVISION  2/19/2010    Performed by WILLIE HATCH at SURGERY [facility] ORS   • AV FISTULA CREATION  2/12/2010    Performed by DAVID CASON at SURGERY [facility]   • CATARACT PHACO WITH IOL  4/8/08    Performed by STACEY PHILLIPS at SURGERY SAME DAY [facility]S   • OTHER ORTHOPEDIC SURGERY  1998    right toe for facititis     Family History   Problem Relation Age of Onset   • Stroke Mother    • Hypertension Mother    • Lung Disease Father         Emphysema, resp failure   • Genitourinary () Problems Maternal Aunt         hematuria   • Hypertension Brother      Social History     Socioeconomic History   • Marital status:      Spouse name: Not on file   • Number of children: Not on file   • Years of education: Not on file   • Highest education level: Not on file   Occupational History   • Not on file   Social Needs   • Financial resource strain: Not on file   • Food insecurity:        Worry: Not on file     Inability: Not on file   • Transportation needs:     Medical: Not on file     Non-medical: Not on file   Tobacco Use   • Smoking status: Former Smoker     Packs/day: 1.00     Years: 40.00     Pack years: 40.00     Types: Cigarettes     Last attempt to quit: 1/1/2009     Years since quitting: 10.8   • Smokeless tobacco: Never Used   • Tobacco comment: 1 pk a day for 35 yrs, QUIT JAN 1 2010   Substance and Sexual Activity   • Alcohol use: No     Alcohol/week: 0.0 oz   • Drug use: No   • Sexual activity: Never     Partners: Female     Comment: , two sons, 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   Lifestyle   • Physical activity:     Days per week: Not on file     Minutes per session: Not on file   • Stress: Not on file   Relationships   • Social connections:     Talks on phone: Not on file     Gets together: Not on file     Attends Jainism service: Not on file     Active member of club or organization: Not on file     Attends meetings of clubs or organizations: Not on file     Relationship status: Not on file   • Intimate partner violence:     Fear of current or ex partner: Not on file     Emotionally abused: Not on file     Physically abused: Not on file     Forced sexual activity: Not on file   Other Topics Concern   • Not on file   Social History Narrative   • Not on file     No Known Allergies    Current Outpatient Medications   Medication Sig Dispense Refill   • Non Formulary Request 2 liters of Oxygen at home     • warfarin (COUMADIN) 5 MG Tab 1.5 tablets  Tonight = 7.5 mg then 1 tab 5mg every evening 45 Tab 0   • gabapentin (NEURONTIN) 300 MG Cap Take 1 Cap by mouth 3 times a day.     • torsemide (DEMADEX) 10 MG tablet TAKE THREE TABLETS BY MOUTH DAILY 30 Tab 2   • albuterol 108 (90 Base) MCG/ACT Aero Soln inhalation aerosol Inhale 2 Puffs by mouth every four hours as needed. 8.5 g 0   • tamsulosin (FLOMAX) 0.4 MG capsule Take 1 Cap by mouth ONE-HALF HOUR AFTER BREAKFAST. 90 Cap 0   •  "omeprazole (PRILOSEC) 40 MG delayed-release capsule Take 40 mg by mouth every morning.     • calcium acetate (PHOS-LO) 667 MG Cap Take 1,334 mg by mouth 3 times a day, with meals.     • levalbuterol (XOPENEX) 1.25 MG/3ML Nebu Soln 3 mL by Nebulization route every four hours as needed for Shortness of Breath. 120 Bullet 11   • pravastatin (PRAVACHOL) 20 MG Tab Take 20 mg by mouth every evening.       No current facility-administered medications for this visit.      Review of Systems   Constitutional: Negative for chills, diaphoresis and fever.   Respiratory: Positive for shortness of breath. Negative for cough.    Cardiovascular: Negative for chest pain, palpitations, orthopnea, claudication, leg swelling and PND.   Gastrointestinal: Negative for abdominal pain and blood in stool.   Genitourinary: Negative for hematuria.   Neurological: Negative for dizziness and loss of consciousness.     All others systems reviewed and negative.   Objective:     /50 (BP Location: Left arm, Patient Position: Sitting, BP Cuff Size: Adult)   Pulse 75   Ht 1.727 m (5' 8\")   Wt 66.2 kg (146 lb)   SpO2 93%  Body mass index is 22.2 kg/m².    Physical Exam   Constitutional: He is oriented to person, place, and time. No distress.   Neck: No JVD present.   Cardiovascular: Normal rate and regular rhythm.   Murmur heard.  Pulses:       Radial pulses are 2+ on the right side and 2+ on the left side.   Pulmonary/Chest: Effort normal. No respiratory distress. He has decreased breath sounds in the right lower field and the left lower field. He has wheezes. He has no rales. He exhibits no tenderness.   Musculoskeletal:         General: No edema.   Neurological: He is alert and oriented to person, place, and time.   Skin: Skin is warm and dry. He is not diaphoretic. No erythema.   Right AV fistula-bruit/thrill   Psychiatric: Mood, memory, affect and judgment normal.   Nursing note and vitals reviewed.    Cardiac Imaging and Procedures " Review:    EKG 2019: sinus rhythm, RBBB, 1st degree AVB    Echocardiogram (2019):   Normal left ventricular systolic function. Left ventricular ejection fraction is visually estimated to be 60%.   No evidence of left atrial appendage thrombus, sludge or spontaneous echo contrast.  Left atrial appendage emptying velocities approximately 20 cm/s.    RFA-Procedural Conclusions per Dr. Grissom's Op Note (10/23/2019):  [facility] EP PROCEDURE NOTE  Procedure(s) Performed:   Supraventricular tachycardia ablation (atrial flutter ablation)   Electrophysiology Study  Electroanatomical 3D mapping  CS/LA pace and record  Programmed stimulation with IV drug infusion  Indication(s):  Typical atrial flutter  : Karon Grissom M.D.  Baseline Rhythm:   Sinus CL 1181 msec  Intervals:   msec   HV 55 msec  AVBCL 320 msec.   Mapping:  Electroanatomical 3D (CARTO FAM) map of CS and right atrium around the cavotricuspid isthmus was created during CS pacing.  Ablation:  Radiofrequency energy was applied using 3.5 mmsaline irrigated catheter, power 40 W, total 9 RF applications, RF time 354 seconds to create a cavotricuspid isthmus line between the tricuspid annulus and Eustachian ridge/inferior vena cava to produce bidirectional isthmus conduction block.  Post Ablation Testin. No inducible arrhythmia with CS pacing post ablation. This was done with burst pacing down to 280 msec along with single and double atrial extra-stimuli down to AVNERP, on/off isuprel infusion at 4 mcg/min.  2. Trans-isthmus conduction time pacing from proximal CS >200 msec.  3. Trans-isthmus conduction time pacing from lateral to the isthmus line >200 msec.  Fluoroscopy Time: 1.4 minutes  Impressions/Plan:   1. History of typical right atrial flutter.  2. Successful catheter mediated ablation of right atrial flutter.  3. Admit to monitored bed overnight.    Labs (personally reviewed and notable for):   Lab Results   Component Value  Date/Time    SODIUM 139 10/27/2019 03:24 AM    POTASSIUM 4.8 10/27/2019 03:24 AM    CHLORIDE 98 10/27/2019 03:24 AM    CO2 24 10/27/2019 03:24 AM    GLUCOSE 94 10/27/2019 03:24 AM    BUN 54 (H) 10/27/2019 03:24 AM    CREATININE 6.41 (HH) 10/27/2019 03:24 AM    CREATININE 2.1 (H) 05/06/2009 10:08 AM      Lab Results   Component Value Date/Time    WBC 5.3 10/27/2019 03:24 AM    RBC 2.99 (L) 10/27/2019 03:24 AM    HEMOGLOBIN 8.9 (L) 10/27/2019 03:24 AM    HEMATOCRIT 29.4 (L) 10/27/2019 03:24 AM    MCV 98.3 (H) 10/27/2019 03:24 AM    MCH 29.8 10/27/2019 03:24 AM    MCHC 30.3 (L) 10/27/2019 03:24 AM    MPV 10.4 10/27/2019 03:24 AM    NEUTSPOLYS 78.80 (H) 10/27/2019 03:24 AM    LYMPHOCYTES 9.00 (L) 10/27/2019 03:24 AM    MONOCYTES 5.80 10/27/2019 03:24 AM    EOSINOPHILS 5.60 10/27/2019 03:24 AM    EOSINOPHILS 2 10/26/2019 05:55 PM    BASOPHILS 0.20 10/27/2019 03:24 AM    HYPOCHROMIA 1+ 09/14/2019 05:58 AM    ANISOCYTOSIS 1+ 10/24/2019 08:58 AM      PT/INR:   Lab Results   Component Value Date/Time    PROTHROMBTM 29.1 (H) 11/13/2019 12:45 PM    INR 2.63 (H) 11/13/2019 12:45 PM   ]  Assessment:     1. Typical atrial flutter (HCC)  EKG   2. S/P ablation of atrial flutter     3. Chronic anticoagulation     4. ESRD on dialysis (HCC)     5. HELGA (obstructive sleep apnea)       Medical Decision Making:  Today's Assessment / Status / Plan:   1. Typical Atrial Flutter  - s/p RFA of right atrial flutter by Dr. Grissom on 10/23/19.  - Per echo (08/2019) shows recovered LV function, EF 60%.  - Asymptomatic, denies recurrence, currently in sinus rhythm, BP stable.  - Off betablocker. Confirmed with Dr. Mendoza  - On OAC with Coumadin, no s/sx bleeding, monitored by anticoagulation clinic, continue.  - Reports continue dyspnea, appears euvolemic, weight down. Chest X-ray shows moderate plural effusion. Recent right thoracentesis 10/26/19 with 1050 cc. Patient to see pulmonology Dr. Fisher this afternoon.   - Patient encouraged to monitor HRs  and call office if abnormal.     2. ESRD  - HD M,W,F  - Followed up [facility]    3. Chronic Anticoagulation: Coumadin    Plan reviewed in detail with the patient and he verbalizes understanding and is in agreement. RTC 6-8 weeks with EP and with Dr. Mendoza as previously scheduled, sooner if clinical condition changes.     Thank you for allowing me to participate in this patients care.  Please contact me with any questions or concerns.     DESIREE Jiang.   [facility]  [phone]    Collaborating MD/ADD: MD Diane.       Eagle Fisher M.D.  [address]  [place] [address]  VIA In Basket

## 2019-11-14 NOTE — PROGRESS NOTES
Anticoagulation Summary  As of 2019    INR goal:   2.0-3.0   TTR:   64.4 % (3.2 mo)   INR used for dosin.63 (2019)   Warfarin maintenance plan:   2.5 mg (5 mg x 0.5) every Mon, Fri; 5 mg (5 mg x 1) all other days   Weekly warfarin total:   30 mg   Plan last modified:   Catina Seymour (10/17/2019)   Next INR check:   2019   Target end date:   Indefinite    Indications    History of atrial flutter [Z86.79]             Anticoagulation Episode Summary     INR check location:   Anticoagulation Clinic    Preferred lab:       Send INR reminders to:       Comments:   [facility] lab      Anticoagulation Care Providers     Provider Role Specialty Phone number    [facility]   [phone]    Martha Light M.D.  [facility] [phone]        Anticoagulation Patient Findings      Spoke with patient to report a therapeutic INR.    Pt instructed to continue with current warfarin dosing regimen, confirms dosing.   Pt denies any s/s of bleeding, bruising, clotting or any changes to diet or medication.    Will follow up in 3 week(s).     Ashley Cabrera, PharmD

## 2019-11-14 NOTE — TELEPHONE ENCOUNTER
ED    Pt wants to know if it is absolutely necessary he keep appt today at 1:20 pm with ED. Please call pt asap at [phone] or [phone].

## 2019-11-15 NOTE — PROGRESS NOTES
"Pulmonary Clinic Note    Chief Complaint:  Chief Complaint   Patient presents with   • Hospital Follow-up     [facility] ED 10/24-10/27 for SOB   • COPD   • Other     CXR 11/13/19, ECHO 10/24/19, Ct-CTA Chest 09/13/19, Labs 10/2019   • Patient Question     Flu shot     HPI:   Parmjit Castelan is a very pleasant 79-year-old male with a 40-pack-year smoking history quit 4 years ago who presents to clinic for followup after recent hospitalizaiton at [facility] for shortness of breath due to pulmonary edema in setting of GARRET on CKD requiring dialysis. He also has severe aortic stenosis. He had undergone a cardiac ablation for atrial flutter 1 day prior to admission and is on coumadin. His respiratory function improved, underwent R sided thoracentesis with removal of ~1200cc straw colored fluid that was transudative in nature.    Today he reports he has lost ~20lbs since starting hemodialysis. He states his breathing is unchanged and still labored compared to baseline. He had a repeat CXR that shows reaccumulation of pleural fluid on the right. He otherwise has been feeling well, no fevers nor chills.     Past Medical History:   Diagnosis Date   • Anesthesia     \"needs more sedation\" (can sometimes hear MD during procedure)    • Anticoagulation monitoring, special range    • Aortic stenosis     mod AS- concern for low flow severe AS with rEFof 30%   • Arterial leg ulcer ([facility]) 11/8/2018   • Atrial flutter ([facility]) 6/12/2019   • Basal cell carcinoma of left cheek 3/26/2015   • BPH 7/14/2009   • Bronchitis 12/25/2018   • CAD (coronary artery disease) 2/20/2014   • CATARACT     sanjuanita surgery complete   • CHF (congestive heart failure), NYHA class II, chronic, systolic ([facility]) E F30 in setting of atrial flutter 6/13/2019   • Chronic respiratory failure with hypoxia ([facility]) 5/8/2016   • CKD (chronic kidney disease) stage 4, GFR 15-29 ml/min ([facility]) 1/15/2010    end stage renal disease   • COPD (chronic obstructive pulmonary disease) " ([facility])     wears 2.5 L o2 sometimes when he sleeps   • Detached retina    • Dialysis     m,w,f [facility]   • EMPHYSEMA    • Glaucoma     Early stage   • Gout    • Heart burn     occas   • Heart murmur     maria esther lee cardiologist   • Hypertension    • Indigestion     occas   • Kidney cyst    • Leg pain, bilateral 8/10/2015   • On supplemental oxygen therapy    • Peripheral vascular disease ([facility]) 8/10/2015   • Pneumonia    • Primary insomnia 3/22/2018   • Proteinuria    • PVD (peripheral vascular disease) ([facility])    • Sleep apnea     O2 PER CANULA  AT NIGHT 2l/m       Past Surgical History:   Procedure Laterality Date   • AV FISTULA CREATION Right 8/6/2019    Procedure: CREATION, AV FISTULA -  RIGHT ARM  ,  and Ligation of Left Arm Fistula;  Surgeon: Sera Peters M.D.;  Location: [facility];  Service: General   • CATH PLACEMENT Right 8/6/2019    Procedure: INSERTION, CATHETER - PERMA ,;  Surgeon: Sera Peters M.D.;  Location: [facility];  Service: General   • JUSTIN  6/13/2019    Procedure: ECHOCARDIOGRAM, TRANSESOPHAGEAL;  Surgeon: Harvinder Hoang M.D.;  Location: [facility];  Service: Cardiac   • CARDIOVERSION  6/13/2019    Procedure: CARDIOVERSION;  Surgeon: Harvinder Hoang M.D.;  Location: [facility];  Service: Cardiac   • AV FISTULA CREATION Right 2/21/2019    Procedure: AV FISTULA CREATION - ARM;  Surgeon: David Cason M.D.;  Location: [facility];  Service: General   • FEMORAL ENDARTERECTOMY Left 1/25/2019    Procedure: FEMORAL ENDARTERECTOMY;  Surgeon: David Cason M.D.;  Location: [facility];  Service: General   • FEMORAL POPLITEAL BYPASS Left 1/25/2019    Procedure: LEFT FEMORAL POPLITEAL POLYTETRAFLUOROETHYLENE ePTFE(PROPATEN VASCULAR GRAFT) BYPASS;  Surgeon: David Cason M.D.;  Location: [facility];  Service: General   • ANGIOGRAM Left 1/25/2019    Procedure: LEFT LEG  ANGIOGRAM;  Surgeon: David Cason M.D.;  Location: SURGERY [facility];  Service: General   • ENDOSCOPY PROCEDURE  3/21/2018    Procedure: ENDOSCOPY PROCEDURE/UPPER;  Surgeon: Enrique Child D.O.;  Location: SURGERY [facility];  Service: Gastroenterology   • COLONOSCOPY - ENDO  8/15/2016    Procedure: COLONOSCOPY - ENDO;  Surgeon: Mane Whatley M.D.;  Location: ENDOSCOPY [facility];  Service:    • GASTROSCOPY WITH BIOPSY  8/13/2016    Procedure: GASTROSCOPY WITH BIOPSY;  Surgeon: Jorge Leavitt M.D.;  Location: ENDOSCOPY [facility];  Service:    • RECOVERY  12/23/2015    Procedure: VASCULAR CASE-CASON-LEFT ARM FISTULOGRAM WITH ANGIOPLASTY;  Surgeon: Recoveryonly Surgery;  Location: SURGERY PRE-POST PROC UNIT [facility];  Service:    • RECOVERY  3/24/2015    Performed by Recoveryonly Surgery at SURGERY PRE-POST PROC UNIT [facility]   • RECOVERY  7/29/2014    Performed by Ir-Recovery Surgery at SURGERY SAME DAY [facility]   • RECOVERY  3/24/2014    Performed by Ir-Recovery Surgery at SURGERY [facility]   • RECOVERY  12/17/2013    Performed by Ir-Recovery Surgery at SURGERY [facility]   • RECOVERY  7/2/2013    Performed by Ir-Recovery Surgery at SURGERY SAME DAY [facility]   • AV FISTULA THROMBOLYSIS  7/2/2013    Performed by David Cason M.D. at SURGERY [facility]   • RECOVERY  1/29/2013    Performed by Ir-Recovery Surgery at SURGERY [facility]   • RECOVERY  7/23/2012    Performed by SURGERY, IR-RECOVERY at SURGERY SAME DAY [facility]   • VITRECTOMY POSTERIOR  10/11/2011    Performed by NAHUM GE at SURGERY SAME DAY [facility]   • RECOVERY  8/12/2011    Performed by SURGERY, IR-RECOVERY at SURGERY SAME DAY [facility]   • VITRECTOMY POSTERIOR  1/18/2011    Performed by NAHUM GE at SURGERY SAME DAY [facility]   • SCLERAL BUCKLING  1/18/2011    Performed by NAHUM GE at SURGERY SAME DAY [facility]   • AV FISTULOGRAM  9/17/2010     Performed by ALESHIA MOSCOSO at SURGERY [facility]   • ANGIOPLASTY BALLOON  9/17/2010    Performed by ALESHIA MOSCOSO at SURGERY [facility]   • AV FISTULOGRAM  7/23/2010    Performed by ALESHIA MOSCOSO at SURGERY [facility]   • ANGIOPLASTY BALLOON  7/23/2010    Performed by ALESHIA MOSCOSO at SURGERY [facility]   • AV FISTULA REVISION  2/19/2010    Performed by WILLIE HATCH at SURGERY [facility]S   • AV FISTULA CREATION  2/12/2010    Performed by ALESHIA MOSCOSO at SURGERY [facility] ORS   • CATARACT PHACO WITH IOL  4/8/08    Performed by STACEY PHILLIPS at SURGERY SAME DAY [facility]S   • OTHER ORTHOPEDIC SURGERY  1998    right toe for facititis       Social History     Socioeconomic History   • Marital status:      Spouse name: Not on file   • Number of children: Not on file   • Years of education: Not on file   • Highest education level: Not on file   Occupational History   • Not on file   Social Needs   • Financial resource strain: Not on file   • Food insecurity:     Worry: Not on file     Inability: Not on file   • Transportation needs:     Medical: Not on file     Non-medical: Not on file   Tobacco Use   • Smoking status: Former Smoker     Packs/day: 1.00     Years: 40.00     Pack years: 40.00     Types: Cigarettes     Last attempt to quit: 1/1/2009     Years since quitting: 10.8   • Smokeless tobacco: Never Used   • Tobacco comment: 1 pk a day for 35 yrs, QUIT JAN 1 2010   Substance and Sexual Activity   • Alcohol use: No     Alcohol/week: 0.0 oz   • Drug use: No   • Sexual activity: Never     Partners: Female     Comment: , two sons, 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   Lifestyle   • Physical activity:     Days per week: Not on file     Minutes per session: Not on file   • Stress: Not on file   Relationships   • Social connections:     Talks on phone: Not on file     Gets together: Not on file     Attends [employer] service: Not on file     Active member  of club or organization: Not on file     Attends meetings of clubs or organizations: Not on file     Relationship status: Not on file   • Intimate partner violence:     Fear of current or ex partner: Not on file     Emotionally abused: Not on file     Physically abused: Not on file     Forced sexual activity: Not on file   Other Topics Concern   • Not on file   Social History Narrative   • Not on file          Family History   Problem Relation Age of Onset   • Stroke Mother    • Hypertension Mother    • Lung Disease Father         Emphysema, resp failure   • Genitourinary () Problems Maternal Aunt         hematuria   • Hypertension Brother        Current Outpatient Medications on File Prior to Visit   Medication Sig Dispense Refill   • Non Formulary Request 2 liters of Oxygen at home     • warfarin (COUMADIN) 5 MG Tab 1.5 tablets  Tonight = 7.5 mg then 1 tab 5mg every evening 45 Tab 0   • gabapentin (NEURONTIN) 300 MG Cap Take 1 Cap by mouth 3 times a day.     • torsemide (DEMADEX) 10 MG tablet TAKE THREE TABLETS BY MOUTH DAILY 30 Tab 2   • albuterol 108 (90 Base) MCG/ACT Aero Soln inhalation aerosol Inhale 2 Puffs by mouth every four hours as needed. 8.5 g 0   • tamsulosin (FLOMAX) 0.4 MG capsule Take 1 Cap by mouth ONE-HALF HOUR AFTER BREAKFAST. 90 Cap 0   • omeprazole (PRILOSEC) 40 MG delayed-release capsule Take 40 mg by mouth every morning.     • calcium acetate (PHOS-LO) 667 MG Cap Take 1,334 mg by mouth 3 times a day, with meals.     • levalbuterol (XOPENEX) 1.25 MG/3ML Nebu Soln 3 mL by Nebulization route every four hours as needed for Shortness of Breath. 120 Bullet 11   • pravastatin (PRAVACHOL) 20 MG Tab Take 20 mg by mouth every evening.       No current facility-administered medications on file prior to visit.        Allergies: Patient has no known allergies.      ROS:   Review of Systems   Constitutional: Negative for chills, fever and weight loss.   HENT: Negative for congestion, sinus pain and sore  "throat.    Eyes: Negative for pain and discharge.   Respiratory: Positive for cough and shortness of breath. Negative for sputum production, wheezing and stridor.    Cardiovascular: Negative for chest pain, orthopnea and leg swelling.   Gastrointestinal: Negative for abdominal pain, diarrhea, nausea and vomiting.   Genitourinary: Negative for dysuria, frequency and urgency.   Musculoskeletal: Negative for myalgias.   Skin: Negative for rash.   Neurological: Negative for dizziness, sensory change, focal weakness, loss of consciousness and headaches.   Psychiatric/Behavioral: Negative.    All other systems reviewed and are negative.      Vitals:  /60 (BP Location: Left arm, Patient Position: Sitting, BP Cuff Size: Adult)   Pulse 73   Ht 1.727 m (5' 8\")   Wt 66.2 kg (146 lb)   SpO2 92%     Physical Exam:  Physical Exam  Constitutional:       General: He is not in acute distress.     Appearance: He is well-developed. He is not diaphoretic.   HENT:      Nose: Nose normal.      Mouth/Throat:      Pharynx: No oropharyngeal exudate.   Eyes:      General: No scleral icterus.        Right eye: No discharge.         Left eye: No discharge.      Conjunctiva/sclera: Conjunctivae normal.      Pupils: Pupils are equal, round, and reactive to light.   Neck:      Musculoskeletal: Neck supple.      Thyroid: No thyromegaly.      Vascular: No JVD.      Trachea: No tracheal deviation.   Cardiovascular:      Rate and Rhythm: Normal rate and regular rhythm.      Heart sounds: Normal heart sounds. No murmur.   Pulmonary:      Effort: No respiratory distress.      Breath sounds: Normal breath sounds. No stridor. No wheezing, rhonchi or rales.      Comments: Decreased breath sounds right base  Abdominal:      General: There is no distension.      Palpations: Abdomen is soft.      Tenderness: There is no tenderness. There is no guarding.   Musculoskeletal: Normal range of motion.         General: No tenderness.   Lymphadenopathy:    "   Cervical: No cervical adenopathy.   Skin:     General: Skin is warm and dry.      Capillary Refill: Capillary refill takes less than 2 seconds.      Coloration: Skin is not pale.      Findings: No erythema.   Neurological:      Mental Status: He is alert and oriented to person, place, and time.      Sensory: No sensory deficit.      Motor: No abnormal muscle tone.      Coordination: Coordination normal.      Deep Tendon Reflexes: Reflexes normal.   Psychiatric:         Behavior: Behavior normal.         Thought Content: Thought content normal.         Judgment: Judgment normal.       Laboratory Data:    PFTs as reviewed by me personally show: none for review    Imaging as reviewed by me personally show:  reaccumulation of pleural fluid    Assessment/Plan:    Problem List Items Addressed This Visit     Transudative pleural effusion     - IR referral placed for CT-guided thoracentesis  - Please note, pt has severe AI but has tolerated large volume thoracentesis in the past  - Please note, patient is on chronic anticoagulation that we can manage and prescribe through clinic         Relevant Orders    IR-THORACENTESIS PUNCTURE RIGHT    FLUID CELL COUNT    CYTOLOGY    FLUID CULTURE W/GRAM STAIN    SOB (shortness of breath)    Chronic cough          Return in about 6 weeks (around 12/26/2019) for with CXR day of appointment.     This note was generated using voice recognition software which has a chance of producing errors of grammar and possibly content.  I have made every reasonable attempt to find and correct any obvious errors, but it should be expected that some may not be found prior to finalization of this note.  __________  Eagle Fisher MD  Pulmonary and Critical Care Medicine  [facility]

## 2019-11-15 NOTE — ASSESSMENT & PLAN NOTE
- IR referral placed for CT-guided thoracentesis  - Please note, pt has severe AI but has tolerated large volume thoracentesis in the past  - Please note, patient is on chronic anticoagulation that we can manage and prescribe through clinic

## 2019-11-17 DIAGNOSIS — I10 ESSENTIAL HYPERTENSION: ICD-10-CM

## 2019-11-18 ENCOUNTER — TELEPHONE (OUTPATIENT)
Dept: VASCULAR LAB | Facility: MEDICAL CENTER | Age: 79
End: 2019-11-18

## 2019-11-19 ENCOUNTER — TELEPHONE (OUTPATIENT)
Dept: PULMONOLOGY | Facility: HOSPICE | Age: 79
End: 2019-11-19

## 2019-11-19 ENCOUNTER — APPOINTMENT (OUTPATIENT)
Dept: WOUND CARE | Facility: MEDICAL CENTER | Age: 79
End: 2019-11-19
Attending: PHYSICIAN ASSISTANT
Payer: MEDICARE

## 2019-11-19 DIAGNOSIS — J94.8 TRANSUDATIVE PLEURAL EFFUSION: ICD-10-CM

## 2019-11-19 NOTE — TELEPHONE ENCOUNTER
Scheduled a CXR for pt prior to his appt per Dr Fisher's OV note. But there is no order.    Pended order.

## 2019-11-19 NOTE — TELEPHONE ENCOUNTER
[facility]    Received call today from Alicia from Interventional Radiology ([phone]), and she states the patient is scheduled for thoracentesis on Nov 27, 2019 and will need to be off warfarin for 5 days.    Called and LM for patient to call the clinic to discuss ena-procedural directions for warfarin and/or bridging for this procedure.     Gita FinkD

## 2019-11-20 ENCOUNTER — TELEPHONE (OUTPATIENT)
Dept: NEPHROLOGY | Facility: MEDICAL CENTER | Age: 79
End: 2019-11-20

## 2019-11-20 ENCOUNTER — ANTICOAGULATION MONITORING (OUTPATIENT)
Dept: VASCULAR LAB | Facility: MEDICAL CENTER | Age: 79
End: 2019-11-20

## 2019-11-20 DIAGNOSIS — N18.6 END STAGE RENAL DISEASE (HCC): ICD-10-CM

## 2019-11-20 DIAGNOSIS — Z86.79 HISTORY OF ATRIAL FLUTTER: ICD-10-CM

## 2019-11-20 NOTE — PROGRESS NOTES
S/w patient regarding upcoming thoracentesis scheduled for 11-27-19.  He will need to hold for five days prior to procedure, but for some reason, he began holding several days ago.  Explained to patient that there would be little value in restarting warfarin dosing now only to stop in two days.   He will continue to hold through procedure day.  Instructed him to bolus with 7.5mg X 2 when restarting warfarin, then resume current warfarin regimen.  CHADS2 = 3, no hx CVA/TIA, ESRD.  I believe bleeding risk with lovenox bridge outweighs benefits of clot prevention.  Federico Cerda, PharmD, BCACP

## 2019-11-21 ENCOUNTER — TELEPHONE (OUTPATIENT)
Dept: CARDIOLOGY | Facility: MEDICAL CENTER | Age: 79
End: 2019-11-21

## 2019-11-21 RX ORDER — TORSEMIDE 10 MG/1
TABLET ORAL
Qty: 270 TAB | Refills: 3 | Status: SHIPPED
Start: 2019-11-21 | End: 2020-07-04

## 2019-11-22 NOTE — TELEPHONE ENCOUNTER
Pt is calling for 's opinion on a angiogram(non-cardiac) he possibly may have done. Pt can be reached at [phone] or [phone].

## 2019-11-26 ENCOUNTER — APPOINTMENT (OUTPATIENT)
Dept: WOUND CARE | Facility: MEDICAL CENTER | Age: 79
End: 2019-11-26
Attending: PHYSICIAN ASSISTANT
Payer: MEDICARE

## 2019-12-03 ENCOUNTER — ANTICOAGULATION MONITORING (OUTPATIENT)
Dept: VASCULAR LAB | Facility: MEDICAL CENTER | Age: 79
End: 2019-12-03

## 2019-12-03 ENCOUNTER — APPOINTMENT (OUTPATIENT)
Dept: VASCULAR LAB | Facility: MEDICAL CENTER | Age: 79
End: 2019-12-03
Attending: INTERNAL MEDICINE
Payer: MEDICARE

## 2019-12-03 ENCOUNTER — HOSPITAL ENCOUNTER (OUTPATIENT)
Dept: LAB | Facility: MEDICAL CENTER | Age: 79
End: 2019-12-03
Attending: NURSE PRACTITIONER
Payer: MEDICARE

## 2019-12-03 DIAGNOSIS — Z86.79 HISTORY OF ATRIAL FLUTTER: ICD-10-CM

## 2019-12-03 DIAGNOSIS — J94.8 TRANSUDATIVE PLEURAL EFFUSION: ICD-10-CM

## 2019-12-03 DIAGNOSIS — I48.92 ATRIAL FLUTTER, UNSPECIFIED TYPE (HCC): ICD-10-CM

## 2019-12-03 LAB
INR PPP: 1.09 (ref 0.87–1.13)
INR PPP: 1.09 (ref 2–3.5)
PROTHROMBIN TIME: 14.2 SEC (ref 12–14.6)

## 2019-12-03 PROCEDURE — 85610 PROTHROMBIN TIME: CPT

## 2019-12-03 PROCEDURE — 36415 COLL VENOUS BLD VENIPUNCTURE: CPT

## 2019-12-04 ENCOUNTER — HOSPITAL ENCOUNTER (OUTPATIENT)
Dept: RADIOLOGY | Facility: MEDICAL CENTER | Age: 79
End: 2019-12-04
Attending: INTERNAL MEDICINE
Payer: MEDICARE

## 2019-12-04 ENCOUNTER — ANTICOAGULATION MONITORING (OUTPATIENT)
Dept: VASCULAR LAB | Facility: MEDICAL CENTER | Age: 79
End: 2019-12-04

## 2019-12-04 VITALS
HEART RATE: 79 BPM | RESPIRATION RATE: 16 BRPM | DIASTOLIC BLOOD PRESSURE: 69 MMHG | OXYGEN SATURATION: 97 % | SYSTOLIC BLOOD PRESSURE: 153 MMHG

## 2019-12-04 DIAGNOSIS — Z86.79 HISTORY OF ATRIAL FLUTTER: ICD-10-CM

## 2019-12-04 PROCEDURE — C1729 CATH, DRAINAGE: HCPCS

## 2019-12-04 NOTE — PROGRESS NOTES
Anticoagulation Summary  As of 12/4/2019    INR goal:   2.0-3.0   TTR:   60.5 % (3.9 mo)   INR used for dosing:      Warfarin maintenance plan:   2.5 mg (5 mg x 0.5) every Mon, Fri; 5 mg (5 mg x 1) all other days   Weekly warfarin total:   30 mg   Plan last modified:   Gaudencio Page PharmD (12/4/2019)   Next INR check:   12/9/2019   Target end date:   Indefinite    Indications    History of atrial flutter [Z86.79]             Anticoagulation Episode Summary     INR check location:   Anticoagulation Clinic    Preferred lab:       Send INR reminders to:       Comments:   [facility] lab      Anticoagulation Care Providers     Provider Role Specialty Phone number    [facility]   [phone]    Martha Light M.D.  Family Medicine [phone]        Anticoagulation Patient Findings    Spoke to patient on the phone.   Recently had a thoracentesis and pulled out a significant amount of fluid in his lungs.  He has been stable for the last few days and was given the okay to start warfarin.  We will start at 5 mg once a day with follow-up early next week.  INR  sub-therapeutic.   Denies signs/symptoms of bleeding and/or thrombosis.   Denies changes to diet or medications.   Follow up appointment in 1 week(s).    5 mg once daily until his INR on Monday the ninth.    Gaudencio Page, DanaeD

## 2019-12-04 NOTE — PROGRESS NOTES
Pre-procedure lung sounds assessed. Lung sounds decreased to right lung fields. US guided right sided therapeutic thoracentesis performed by Dr. Stovall. No H&P required as this is a non-sedation procedure. Right  -posterior chest wall site accessed.  1350 ml of alex colored fluid obtained and discarded. Pt tolerated the procedure well. VS as documented. Pt discharge instructions given and patient questions and concerns addressed.

## 2019-12-04 NOTE — PROGRESS NOTES
Not sure if the MD ok'd him to start warfarin. Asked the patient to call us back     Gaudencio Page M.S., Pharm.D., BCACP, [facility]    This note was created using voice recognition software (Dragon). The accuracy of the dictation is limited by the abilities of the software. I have reviewed the note prior to signing, however some errors in grammar and context are still possible. If you have any questions related to this note please do not hesitate to contact our office.

## 2019-12-09 ENCOUNTER — HOSPITAL ENCOUNTER (OUTPATIENT)
Dept: LAB | Facility: MEDICAL CENTER | Age: 79
End: 2019-12-09
Attending: NURSE PRACTITIONER
Payer: MEDICARE

## 2019-12-09 DIAGNOSIS — I48.92 ATRIAL FLUTTER, UNSPECIFIED TYPE (HCC): ICD-10-CM

## 2019-12-09 LAB
INR PPP: 1.16 (ref 0.87–1.13)
INR PPP: 1.16 (ref 2–3.5)
PROTHROMBIN TIME: 15.1 SEC (ref 12–14.6)

## 2019-12-09 PROCEDURE — 36415 COLL VENOUS BLD VENIPUNCTURE: CPT

## 2019-12-09 PROCEDURE — 85610 PROTHROMBIN TIME: CPT

## 2019-12-10 ENCOUNTER — ANTICOAGULATION MONITORING (OUTPATIENT)
Dept: VASCULAR LAB | Facility: MEDICAL CENTER | Age: 79
End: 2019-12-10

## 2019-12-10 DIAGNOSIS — J40 BRONCHITIS: ICD-10-CM

## 2019-12-10 DIAGNOSIS — Z86.79 HISTORY OF ATRIAL FLUTTER: ICD-10-CM

## 2019-12-10 RX ORDER — ASCORBIC ACID, THIAMINE, RIBOFLAVIN, NIACINAMIDE, PYRIDOXINE, FOLIC ACID, COBALAMIN, BIOTIN, PANTOTHENIC ACID 100; 1.5; 1.7; 20; 10; 1; 6; 300; 1 MG/1; MG/1; MG/1; MG/1; MG/1; MG/1; UG/1; UG/1; MG/1
TABLET, COATED ORAL
Qty: 90 TAB | Refills: 3 | Status: SHIPPED
Start: 2019-12-10 | End: 2020-07-04

## 2019-12-10 RX ORDER — CALCIUM ACETATE 667 MG/1
CAPSULE ORAL
Qty: 420 CAP | Refills: 11 | Status: SHIPPED
Start: 2019-12-10 | End: 2020-07-04

## 2019-12-10 RX ORDER — AZITHROMYCIN 250 MG/1
TABLET, FILM COATED ORAL
Qty: 6 TAB | Refills: 0 | Status: SHIPPED
Start: 2019-12-10 | End: 2019-12-17

## 2019-12-10 RX ORDER — TAMSULOSIN HYDROCHLORIDE 0.4 MG/1
CAPSULE ORAL
Qty: 90 CAP | Refills: 3 | Status: SHIPPED
Start: 2019-12-10 | End: 2019-12-17

## 2019-12-10 NOTE — PROGRESS NOTES
Anticoagulation Summary  As of 12/10/2019    INR goal:   2.0-3.0   TTR:   57.3 % (4.1 mo)   INR used for dosin.16! (2019)   Warfarin maintenance plan:   2.5 mg (5 mg x 0.5) every Mon, Fri; 5 mg (5 mg x 1) all other days   Weekly warfarin total:   30 mg   Plan last modified:   Gaudencio Page PharmD (12/10/2019)   Next INR check:   2019   Target end date:   Indefinite    Indications    History of atrial flutter [Z86.79]             Anticoagulation Episode Summary     INR check location:   Anticoagulation Clinic    Preferred lab:       Send INR reminders to:       Comments:   [facility] lab      Anticoagulation Care Providers     Provider Role Specialty Phone number    [facility]   [phone]    Martha Light M.D.  Family Medicine [phone]        Anticoagulation Patient Findings    Spoke to patient on the phone.   INR  sub-therapeutic.   Denies signs/symptoms of bleeding and/or thrombosis.   Denies changes to diet or medications.   Follow up appointment in 4 days     10mg x 2 days then resume       Gaudencio Page, PharmD, MS, BCACP, [facility]    This note was created using voice recognition software (Dragon). The accuracy of the dictation is limited by the abilities of the software. I have reviewed the note prior to signing, however some errors in grammar and context are still possible. If you have any questions related to this note please do not hesitate to contact our office.

## 2019-12-11 ENCOUNTER — ANTICOAGULATION MONITORING (OUTPATIENT)
Dept: VASCULAR LAB | Facility: MEDICAL CENTER | Age: 79
End: 2019-12-11

## 2019-12-11 DIAGNOSIS — Z86.79 HISTORY OF ATRIAL FLUTTER: ICD-10-CM

## 2019-12-11 NOTE — PROGRESS NOTES
Anticoagulation Summary  As of 12/11/2019    INR goal:   2.0-3.0   TTR:   57.3 % (4.1 mo)   INR used for dosing:      Warfarin maintenance plan:   5 mg (5 mg x 1) every day   Weekly warfarin total:   35 mg   Plan last modified:   Gaudencio Page PharmD (12/11/2019)   Next INR check:   12/13/2019   Target end date:   Indefinite    Indications    History of atrial flutter [Z86.79]             Anticoagulation Episode Summary     INR check location:   Anticoagulation Clinic    Preferred lab:       Send INR reminders to:       Comments:   [facility] lab      Anticoagulation Care Providers     Provider Role Specialty Phone number    [facility]   [phone]    Martha Light M.D.  Family Medicine [phone]        Anticoagulation Patient Findings      Spoke to patient on the phone.   He is started on a Z-Donte.  So we will change the recommended dose today so he is only taking 7.5 mg.    Gaudencio Page, PharmMEGHAN, MS, BCACP, [facility]    This note was created using voice recognition software (Dragon). The accuracy of the dictation is limited by the abilities of the software. I have reviewed the note prior to signing, however some errors in grammar and context are still possible. If you have any questions related to this note please do not hesitate to contact our office.

## 2019-12-13 ENCOUNTER — HOSPITAL ENCOUNTER (OUTPATIENT)
Dept: LAB | Facility: MEDICAL CENTER | Age: 79
End: 2019-12-13
Attending: NURSE PRACTITIONER
Payer: MEDICARE

## 2019-12-13 LAB
INR PPP: 1.34 (ref 0.87–1.13)
PROTHROMBIN TIME: 17 SEC (ref 12–14.6)

## 2019-12-13 PROCEDURE — 85610 PROTHROMBIN TIME: CPT

## 2019-12-13 PROCEDURE — 36415 COLL VENOUS BLD VENIPUNCTURE: CPT

## 2019-12-16 ENCOUNTER — ANTICOAGULATION MONITORING (OUTPATIENT)
Dept: MEDICAL GROUP | Facility: PHYSICIAN GROUP | Age: 79
End: 2019-12-16

## 2019-12-16 DIAGNOSIS — Z86.79 HISTORY OF ATRIAL FLUTTER: ICD-10-CM

## 2019-12-16 NOTE — PROGRESS NOTES
Anticoagulation Summary  As of 2019    INR goal:   2.0-3.0   TTR:   55.5 % (4.2 mo)   INR used for dosin.34! (2019)   Warfarin maintenance plan:   5 mg (5 mg x 1) every day   Weekly warfarin total:   35 mg   Plan last modified:   Danae EstebanD (2019)   Next INR check:   2019   Target end date:   Indefinite    Indications    History of atrial flutter [Z86.79]             Anticoagulation Episode Summary     INR check location:   Anticoagulation Clinic    Preferred lab:       Send INR reminders to:       Comments:   [facility]      Anticoagulation Care Providers     Provider Role Specialty Phone number    [facility]   [phone]    Martha Light M.D.  Family Medicine [phone]        Anticoagulation Patient Findings    Spoke with the patient on the phone today, reporting a SUB-therapeutic INR of 1.34.  Confirmed the current warfarin dosing regimen and patient compliance. Patient denies any missed doses. Patient denies any interval changes to diet and/or medications. Patient denies any signs/symptoms of bleeding or clotting.  Patient instructed to bolus with 10mg x 2 days, then resume 5mg QD. Patient asked to retest INR again on Friday.     Gita FinkD

## 2019-12-17 ENCOUNTER — OFFICE VISIT (OUTPATIENT)
Dept: CARDIOLOGY | Facility: MEDICAL CENTER | Age: 79
End: 2019-12-17
Payer: MEDICARE

## 2019-12-17 VITALS
BODY MASS INDEX: 21.98 KG/M2 | SYSTOLIC BLOOD PRESSURE: 126 MMHG | WEIGHT: 145 LBS | HEIGHT: 68 IN | HEART RATE: 90 BPM | OXYGEN SATURATION: 91 % | DIASTOLIC BLOOD PRESSURE: 52 MMHG

## 2019-12-17 DIAGNOSIS — R93.1 ELEVATED CORONARY ARTERY CALCIUM SCORE: ICD-10-CM

## 2019-12-17 DIAGNOSIS — Z79.01 CHRONIC ANTICOAGULATION: Chronic | ICD-10-CM

## 2019-12-17 DIAGNOSIS — E78.5 DYSLIPIDEMIA: ICD-10-CM

## 2019-12-17 DIAGNOSIS — I35.0 SEVERE AORTIC STENOSIS: ICD-10-CM

## 2019-12-17 DIAGNOSIS — I10 ESSENTIAL HYPERTENSION: ICD-10-CM

## 2019-12-17 PROCEDURE — 99214 OFFICE O/P EST MOD 30 MIN: CPT | Performed by: INTERNAL MEDICINE

## 2019-12-17 RX ORDER — TRAZODONE HYDROCHLORIDE 150 MG/1
150 TABLET ORAL SEE ADMIN INSTRUCTIONS
COMMUNITY
End: 2020-10-29 | Stop reason: SDUPTHER

## 2019-12-19 ENCOUNTER — OFFICE VISIT (OUTPATIENT)
Dept: PULMONOLOGY | Facility: HOSPICE | Age: 79
End: 2019-12-19
Payer: MEDICARE

## 2019-12-19 ENCOUNTER — APPOINTMENT (OUTPATIENT)
Dept: RADIOLOGY | Facility: IMAGING CENTER | Age: 79
End: 2019-12-19
Payer: MEDICARE

## 2019-12-19 VITALS
SYSTOLIC BLOOD PRESSURE: 126 MMHG | HEART RATE: 93 BPM | OXYGEN SATURATION: 93 % | WEIGHT: 145 LBS | DIASTOLIC BLOOD PRESSURE: 56 MMHG | BODY MASS INDEX: 21.48 KG/M2 | HEIGHT: 69 IN

## 2019-12-19 DIAGNOSIS — J45.51 SEVERE PERSISTENT ASTHMA WITH ACUTE EXACERBATION: ICD-10-CM

## 2019-12-19 DIAGNOSIS — J44.9 COPD, GROUP B, BY GOLD 2017 CLASSIFICATION (HCC): ICD-10-CM

## 2019-12-19 DIAGNOSIS — J94.8 TRANSUDATIVE PLEURAL EFFUSION: ICD-10-CM

## 2019-12-19 DIAGNOSIS — G47.33 OSA (OBSTRUCTIVE SLEEP APNEA): ICD-10-CM

## 2019-12-19 PROCEDURE — 71046 X-RAY EXAM CHEST 2 VIEWS: CPT | Mod: TC | Performed by: NURSE PRACTITIONER

## 2019-12-19 PROCEDURE — 99214 OFFICE O/P EST MOD 30 MIN: CPT | Performed by: INTERNAL MEDICINE

## 2019-12-19 RX ORDER — ALBUTEROL SULFATE 90 UG/1
2 AEROSOL, METERED RESPIRATORY (INHALATION) EVERY 4 HOURS PRN
Qty: 1 INHALER | Refills: 1 | Status: SHIPPED | OUTPATIENT
Start: 2019-12-19 | End: 2020-02-06

## 2019-12-19 RX ORDER — FLUTICASONE PROPIONATE 50 MCG
1-2 SPRAY, SUSPENSION (ML) NASAL DAILY
Qty: 1 BOTTLE | Refills: 6 | Status: SHIPPED | OUTPATIENT
Start: 2019-12-19 | End: 2020-10-19

## 2019-12-19 RX ORDER — MONTELUKAST SODIUM 10 MG/1
10 TABLET ORAL EVERY EVENING
Qty: 90 TAB | Refills: 3 | Status: SHIPPED
Start: 2019-12-19 | End: 2020-07-04

## 2019-12-19 RX ORDER — PREDNISONE 10 MG/1
TABLET ORAL
Qty: 30 TAB | Refills: 2 | Status: SHIPPED
Start: 2019-12-19 | End: 2020-02-06

## 2019-12-19 NOTE — PROGRESS NOTES
Chief Complaint   Patient presents with   • COPD     Last seen 11/14/19   • Results     CXR 12/19/19   • Other     Thorcentesis 12/04/19       Problems:   1. HELGA (obstructive sleep apnea)    2. COPD, group B, by GOLD 2017 classification (HCC)    3. Severe persistent asthma with acute exacerbation    4. Transudative pleural effusion        Assessment/Plan:   # Asthma/COPD exacerbation   -- PFTs consistent with asthma/COPD overlap syndrome. Restrictive defect is likely related to pleural effusion.   -- mMRC 2 consistent with GOLD stage B   -- Advised patient to stop symbicort   -- Start prednisone 30 mg X 5 days   -- Continue trelegy once daily, albuterol as needed, and start singulair 10 mg nightly   -- Start nasal irrigation and added flonase for allergic rhinitis and advised patient to start antihistamine   -- Discussed about the importance of aggressively treating her sinobronchitis as nasal drip is known to have elevated IL-5 which can stimulates eosinophil colony formation, making her asthma worsen and harder to control     # History of HELGA   -- Check sleep study   -- Although patient refused CPAP therapy, he may be a candidate for oral appliance     # Recurrent right pleural effusion   -- Advised patient to avoid future thoracentesis unless his SOB worsen despite maximized on COPD/asthma therapies as above   -- Cont HD with volume removal     # Immunizations   -- Flu vaccination 11/2019, PCV 13 10/2015, PPSV23 09/2016.    HPI:  Mr. Castelan is a 79-year-old male with a history of COPD, right pleural effusion, ESRD on HD every M/W/F, and severe aortic stenosis, presents today for a follow up. Patient was previously seen by Dr. Fisher on 11/14 for COPD and right pleural effusion. He had thoracentesis performed during his previous hospitalization back in October showing transudative pattern. During his previous clinic visit, CXR showed recurrent right pleural effusion and he underwent an IR guided thoracentesis on  "12/4/19 removing 1.35 liters. Today, he had a CXR showing recurrent right pleural effusion which is smaller in comparison to prior imaging. Subjective, patient reports having shortness of breath on exertion, severe nasal congestion, and productive cough with white phlegm. He is able to ambulate about 30 feet before he has to stop. His shortness of breath has significantly improve in comparison to two months ago. He has been getting fluid removal via HD every M/W/F. Denies fever/chills, chest pain, LE swelling, orthopnea, PND, or N/V. He is currently on symbicort, trelegy, and xopenex nebulizer. He is using his nebulizer about twice daily.    Patient is a former smoker with 40 pack years smoking who quit in 2009. Review of his PFTs showed mixed obstructive and restrictive defect with significant response to bronchodilator suggestive of airway reactive disease. He is awaiting to undergo an angiogram for further evaluation of his severe aortic stenosis. He state that he was diagnosed with sleep apnea but refused to use CPAP 5 years ago. He received his flu vaccination 11/2019, PCV 13 10/2015, PPSV23 09/2016.       Past Medical History:   Diagnosis Date   • Anesthesia     \"needs more sedation\" (can sometimes hear MD during procedure)    • Anticoagulation monitoring, special range    • Aortic stenosis     mod AS- concern for low flow severe AS with rEFof 30%   • Arterial leg ulcer ([facility]) 11/8/2018   • Atrial flutter ([facility]) 6/12/2019   • Basal cell carcinoma of left cheek 3/26/2015   • BPH 7/14/2009   • Bronchitis 12/25/2018   • CAD (coronary artery disease) 2/20/2014   • CATARACT     sanjuanita surgery complete   • CHF (congestive heart failure), NYHA class II, chronic, systolic ([facility]) E F30 in setting of atrial flutter 6/13/2019   • Chronic respiratory failure with hypoxia ([facility]) 5/8/2016   • CKD (chronic kidney disease) stage 4, GFR 15-29 ml/min ([facility]) 1/15/2010    end stage renal disease   • COPD (chronic obstructive pulmonary " disease) ([facility])     wears 2.5 L o2 sometimes when he sleeps   • Detached retina    • Dialysis     m,w,f [facility]   • EMPHYSEMA    • Glaucoma     Early stage   • Gout    • Heart burn     occas   • Heart murmur     maria esther lee cardiologist   • Hypertension    • Indigestion     occas   • Kidney cyst    • Leg pain, bilateral 8/10/2015   • On supplemental oxygen therapy    • Peripheral vascular disease ([facility]) 8/10/2015   • Pneumonia    • Primary insomnia 3/22/2018   • Proteinuria    • PVD (peripheral vascular disease) ([facility])    • Sleep apnea     O2 PER CANULA  AT NIGHT 2l/m       Past Surgical History:   Procedure Laterality Date   • AV FISTULA CREATION Right 8/6/2019    Procedure: CREATION, AV FISTULA -  RIGHT ARM  ,  and Ligation of Left Arm Fistula;  Surgeon: Sera Peters M.D.;  Location: [facility];  Service: General   • CATH PLACEMENT Right 8/6/2019    Procedure: INSERTION, CATHETER - PERMA ,;  Surgeon: Sera Peters M.D.;  Location: [facility];  Service: General   • JUSTIN  6/13/2019    Procedure: ECHOCARDIOGRAM, TRANSESOPHAGEAL;  Surgeon: Harvinder Hoang M.D.;  Location: [facility];  Service: Cardiac   • CARDIOVERSION  6/13/2019    Procedure: CARDIOVERSION;  Surgeon: Harvinder Hoang M.D.;  Location: [facility];  Service: Cardiac   • AV FISTULA CREATION Right 2/21/2019    Procedure: AV FISTULA CREATION - ARM;  Surgeon: David Cason M.D.;  Location: [facility];  Service: General   • FEMORAL ENDARTERECTOMY Left 1/25/2019    Procedure: FEMORAL ENDARTERECTOMY;  Surgeon: David Cason M.D.;  Location: [facility];  Service: General   • FEMORAL POPLITEAL BYPASS Left 1/25/2019    Procedure: LEFT FEMORAL POPLITEAL POLYTETRAFLUOROETHYLENE ePTFE(PROPATEN VASCULAR GRAFT) BYPASS;  Surgeon: David Cason M.D.;  Location: [facility];  Service: General   • ANGIOGRAM Left 1/25/2019    Procedure:  LEFT LEG ANGIOGRAM;  Surgeon: David Cason M.D.;  Location: SURGERY [facility];  Service: General   • ENDOSCOPY PROCEDURE  3/21/2018    Procedure: ENDOSCOPY PROCEDURE/UPPER;  Surgeon: Enrique Child D.O.;  Location: [facility];  Service: Gastroenterology   • COLONOSCOPY - ENDO  8/15/2016    Procedure: COLONOSCOPY - ENDO;  Surgeon: Mane Whatley M.D.;  Location: ENDOSCOPY [facility];  Service:    • GASTROSCOPY WITH BIOPSY  8/13/2016    Procedure: GASTROSCOPY WITH BIOPSY;  Surgeon: Jorge Leavitt M.D.;  Location: ENDOSCOPY [facility];  Service:    • RECOVERY  12/23/2015    Procedure: VASCULAR CASE-CASON-LEFT ARM FISTULOGRAM WITH ANGIOPLASTY;  Surgeon: [facility];  [place] [facility] [facility];  Service:    • RECOVERY  3/24/2015    Performed by [facility] at [facility]   • RECOVERY  7/29/2014    Performed by [facility] at SURGERY SAME DAY [facility]   • RECOVERY  3/24/2014    Performed by [facility] at SURGERY [facility]   • RECOVERY  12/17/2013    Performed by [facility] at SURGERY SAME DAY [facility]   • RECOVERY  7/2/2013    Performed by [facility] at SURGERY SAME DAY [facility]   • AV FISTULA THROMBOLYSIS  7/2/2013    Performed by David Cason M.D. at SURGERY [facility]   • RECOVERY  1/29/2013    Performed by [facility]Recovery Surgery at SURGERY SAME DAY [facility]   • RECOVERY  7/23/2012    Performed by SURGERY, IR-RECOVERY at SURGERY SAME DAY [facility]   • VITRECTOMY POSTERIOR  10/11/2011    Performed by NAHUM GE at SURGERY SAME DAY [facility]   • RECOVERY  8/12/2011    Performed by SURGERY, IR-RECOVERY at SURGERY SAME DAY [facility]   • VITRECTOMY POSTERIOR  1/18/2011    Performed by NAHUM GE at SURGERY SAME DAY [facility]   • SCLERAL BUCKLING  1/18/2011    Performed by NAHUM GE at SURGERY SAME DAY [facility]   • AV FISTULOGRAM   9/17/2010    Performed by ALESHIA MOSCOSO at SURGERY [facility]   • ANGIOPLASTY BALLOON  9/17/2010    Performed by ALESHIA MOSCOSO at SURGERY [facility]   • AV FISTULOGRAM  7/23/2010    Performed by ALESHIA MOSCOSO at SURGERY [facility]   • ANGIOPLASTY BALLOON  7/23/2010    Performed by ALESHIA MOSCOSO at SURGERY [facility]   • AV FISTULA REVISION  2/19/2010    Performed by WILLIE HATCH at SURGERY [facility]   • AV FISTULA CREATION  2/12/2010    Performed by ALESHIA MOSCOSO at SURGERY [facility]   • CATARACT PHACO WITH IOL  4/8/08    Performed by STACEY PHILLIPS at SURGERY SAME DAY [facility]   • OTHER ORTHOPEDIC SURGERY  1998    right toe for facititis       ROS:   Constitutional: Denies fevers, chills, night sweats, fatigue or weight loss  Eyes: Denies vision loss, pain, drainage, double vision  Ears, Nose, Throat: Denies earache, tinnitus, hoarseness  Cardiovascular: Denies chest pain, tightness, palpitations  Respiratory: See HPI  Sleep: See HPI   GI: Denies abdominal pain, nausea, vomiting, diarrhea  : Denies frequent urination, hematuria, painful urination  Musculoskeletal: Denies back pain, painful joints, sore muscles  Neurological: Denies headaches, seizures  Skin: Denies rashes, color changes  Psychiatric: Denies depression or thoughts of suicide  Hematologic: Denies bleeding tendency or clotting tendency  Allergic/Immunologic: Denies rhinitis, skin sensitivity    Social History     Socioeconomic History   • Marital status:      Spouse name: Not on file   • Number of children: Not on file   • Years of education: Not on file   • Highest education level: Not on file   Occupational History   • Not on file   Social Needs   • Financial resource strain: Not on file   • Food insecurity:     Worry: Not on file     Inability: Not on file   • Transportation needs:     Medical: Not on file     Non-medical: Not on file   Tobacco Use   • Smoking status:  Former Smoker     Packs/day: 1.00     Years: 40.00     Pack years: 40.00     Types: Cigarettes     Last attempt to quit: 1/1/2009     Years since quitting: 10.9   • Smokeless tobacco: Never Used   • Tobacco comment: 1 pk a day for 35 yrs, QUIT JAN 1 2010   Substance and Sexual Activity   • Alcohol use: No     Alcohol/week: 0.0 oz   • Drug use: No   • Sexual activity: Never     Partners: Female     Comment: , two sons, 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   Lifestyle   • Physical activity:     Days per week: Not on file     Minutes per session: Not on file   • Stress: Not on file   Relationships   • Social connections:     Talks on phone: Not on file     Gets together: Not on file     Attends Anabaptist service: Not on file     Active member of club or organization: Not on file     Attends meetings of clubs or organizations: Not on file     Relationship status: Not on file   • Intimate partner violence:     Fear of current or ex partner: Not on file     Emotionally abused: Not on file     Physically abused: Not on file     Forced sexual activity: Not on file   Other Topics Concern   • Not on file   Social History Narrative   • Not on file     Patient has no known allergies.  Current Outpatient Medications on File Prior to Visit   Medication Sig Dispense Refill   • traZODone (DESYREL) 150 MG Tab at bedtime as needed.     • B Complex-C-Folic Acid (DIALYVITE TABLET) Tab TAKE ONE TABLET BY MOUTH DAILY 90 Tab 3   • Calcium Acetate, Phos Binder, 667 MG Cap TAKE 3 CAPSULES BY MOUTH WITH MEALS (3 MEALS) AND 2 CAPSULES WITH SNACKS (2 SNACKS) 420 Cap 11   • torsemide (DEMADEX) 10 MG tablet TAKE THREE TABLETS BY MOUTH DAILY 270 Tab 3   • Non Formulary Request 2 liters of Oxygen at home     • warfarin (COUMADIN) 5 MG Tab 1.5 tablets  Tonight = 7.5 mg then 1 tab 5mg every evening 45 Tab 0   • tamsulosin (FLOMAX) 0.4 MG capsule Take 1 Cap by mouth ONE-HALF HOUR AFTER BREAKFAST. 90 Cap 0   • omeprazole (PRILOSEC) 40 MG delayed-release  "capsule Take 40 mg by mouth every morning.     • calcium acetate (PHOS-LO) 667 MG Cap Take 1,334 mg by mouth 3 times a day, with meals.     • levalbuterol (XOPENEX) 1.25 MG/3ML Nebu Soln 3 mL by Nebulization route every four hours as needed for Shortness of Breath. 120 Bullet 11   • pravastatin (PRAVACHOL) 20 MG Tab Take 20 mg by mouth every evening.       No current facility-administered medications on file prior to visit.      /56 (BP Location: Left arm, Patient Position: Sitting, BP Cuff Size: Adult)   Pulse 93   Ht 1.753 m (5' 9\")   Wt 65.8 kg (145 lb)   SpO2 93%   Family History   Problem Relation Age of Onset   • Stroke Mother    • Hypertension Mother    • Lung Disease Father         Emphysema, resp failure   • Genitourinary () Problems Maternal Aunt         hematuria   • Hypertension Brother      Immunization History   Administered Date(s) Administered   • Hepatitis B Vaccine Recombivax (Adol/Adult) 03/18/2010, 07/20/2010, 03/16/2011, 10/28/2013, 11/29/2013, 12/31/2013, 04/30/2014, 07/14/2014, 08/18/2014, 09/19/2014, 01/28/2015, 01/13/2017   • Influenza Seasonal Injectable 12/08/2012   • Influenza TIV (IM) 10/01/2013   • Influenza Vaccine Adult HD 10/05/2013, 10/11/2015, 10/23/2017, 09/27/2018, 11/16/2019   • Influenza, Unspecified - Historical Data 09/01/2014, 09/14/2016   • Pneumococcal Conjugate Vaccine (Prevnar/PCV-13) 10/11/2015   • Pneumococcal polysaccharide vaccine (PPSV-23) 11/20/2013, 09/08/2016   • Recombinant Zoster Vaccine (RZV) (Shingrix) 04/08/2018, 11/14/2018   • Zoster Vaccine Live (ZVL) (Zostavax) 10/11/2015         Physical Exam:  General: Nontoxic appearing   HEENT: PERRLA, EOMI, no scleral icterus, no nasal or oral lesions  Neck: No thyromegaly, no adenopathy, no bruits  Mallampatti: Grade II  Lungs: Good air entry with diffuse ronchi and wheezing.   Heart: Regular rate and rhythm, no gallops or murmurs  Abdomen: Soft, benign, no organomegaly  Extremities: No clubbing, " cyanosis, or edema  Neurologic: Cranial nerve, motor, and sensory exam are normal    Diagnostic Tests:   CXR reviewed 12/19/19 -> right sided pleural effusion.      PFTs 06/2019 reviewed -> mixed restrictive/obstructive defect with significant response to inhaled bronchodilator.     Nadya Durant MD   Pulmonary Critical Care   [facility]

## 2019-12-20 ENCOUNTER — HOSPITAL ENCOUNTER (OUTPATIENT)
Dept: LAB | Facility: MEDICAL CENTER | Age: 79
End: 2019-12-20
Attending: NURSE PRACTITIONER
Payer: MEDICARE

## 2019-12-20 DIAGNOSIS — I48.92 ATRIAL FLUTTER, UNSPECIFIED TYPE (HCC): ICD-10-CM

## 2019-12-20 LAB
INR PPP: 3.08 (ref 0.87–1.13)
PROTHROMBIN TIME: 33 SEC (ref 12–14.6)

## 2019-12-20 PROCEDURE — 36415 COLL VENOUS BLD VENIPUNCTURE: CPT

## 2019-12-20 PROCEDURE — 85610 PROTHROMBIN TIME: CPT

## 2019-12-24 ENCOUNTER — ANTICOAGULATION MONITORING (OUTPATIENT)
Dept: VASCULAR LAB | Facility: MEDICAL CENTER | Age: 79
End: 2019-12-24

## 2019-12-24 DIAGNOSIS — Z86.79 HISTORY OF ATRIAL FLUTTER: ICD-10-CM

## 2019-12-24 NOTE — PROGRESS NOTES
Anticoagulation Summary  As of 12/24/2019    INR goal:   2.0-3.0   TTR:   55.6 % (4.5 mo)   INR used for dosing:   3.08! (12/20/2019)   Warfarin maintenance plan:   5 mg (5 mg x 1) every day   Weekly warfarin total:   35 mg   Plan last modified:   Danae EstebanD (12/11/2019)   Next INR check:   12/27/2019   Target end date:   Indefinite    Indications    History of atrial flutter [Z86.79]             Anticoagulation Episode Summary     INR check location:   Anticoagulation Clinic    Preferred lab:       Send INR reminders to:       Comments:   [facility] lab      Anticoagulation Care Providers     Provider Role Specialty Phone number    [facility]   [phone]    Martha Light M.D.  Family Medicine [phone]        Anticoagulation Patient Findings  Patient Findings     Positives:   Upcoming invasive procedure    Negatives:   Signs/symptoms of thrombosis, Signs/symptoms of bleeding, Laboratory test error suspected, Change in health, Change in alcohol use, Change in activity, Emergency department visit, Upcoming dental procedure, Missed doses, Extra doses, Change in medications, Change in diet/appetite, Hospital admission, Bruising, Other complaints        Spoke with patient today regarding supratherapeutic INR of 3.08.  Patient denies any signs/symptoms of bruising or bleeding or any changes in diet and medications.  Instructed patient to call clinic with any questions or concerns.  Instructed patient to decrease today's dose to 2.5mg, then resume current warfarin regimen.  Angiogram scheduled for 1-16-20, will need INR to be at 2.0.  Follow up in 3 days, to reduce risk of adverse events related to this high risk medication,  Warfarin.    Federico Cerda, PharmD, BCACP

## 2019-12-28 DIAGNOSIS — G25.81 RESTLESS LEG: ICD-10-CM

## 2019-12-30 ENCOUNTER — HOSPITAL ENCOUNTER (OUTPATIENT)
Dept: LAB | Facility: MEDICAL CENTER | Age: 79
End: 2019-12-30
Attending: NURSE PRACTITIONER
Payer: MEDICARE

## 2019-12-30 DIAGNOSIS — I48.92 ATRIAL FLUTTER, UNSPECIFIED TYPE (HCC): ICD-10-CM

## 2019-12-30 LAB
INR PPP: 1.63 (ref 0.87–1.13)
PROTHROMBIN TIME: 19.9 SEC (ref 12–14.6)

## 2019-12-30 PROCEDURE — 85610 PROTHROMBIN TIME: CPT

## 2019-12-30 PROCEDURE — 36415 COLL VENOUS BLD VENIPUNCTURE: CPT

## 2019-12-31 ENCOUNTER — ANTICOAGULATION MONITORING (OUTPATIENT)
Dept: VASCULAR LAB | Facility: MEDICAL CENTER | Age: 79
End: 2019-12-31

## 2019-12-31 DIAGNOSIS — Z86.79 HISTORY OF ATRIAL FLUTTER: ICD-10-CM

## 2019-12-31 NOTE — PROGRESS NOTES
Anticoagulation Summary  As of 2019    INR goal:   2.0-3.0   TTR:   56.5 % (4.8 mo)   INR used for dosin.63! (2019)   Warfarin maintenance plan:   7.5 mg (5 mg x 1.5) every Mon, Fri; 5 mg (5 mg x 1) all other days   Weekly warfarin total:   40 mg   Plan last modified:   Masood Hoffman, PharmD (2019)   Next INR check:   2020   Target end date:   Indefinite    Indications    History of atrial flutter [Z86.79]             Anticoagulation Episode Summary     INR check location:   Anticoagulation Clinic    Preferred lab:       Send INR reminders to:       Comments:   [facility] lab      Anticoagulation Care Providers     Provider Role Specialty Phone number    [facility]   [phone]    Martha Light M.D.  [facility] [phone]        Anticoagulation Patient Findings          HPI:  Parmjit Castelan, on anticoagulation therapy with warfarin for AF.   Changes to current medical/health status since last appt: none  Denies signs/symptoms of bleeding and/or thrombosis since the last appt.    Denies any interval changes to diet  Denies any interval changes to medications since last appt.   Denies any complications or cost restrictions with current therapy.     A/P   INR  SUB-therapeutic.   Begin 14% increased regimen.  Pt has upcoming angiogram 20 and INR needs to be below 2.     Next INR in 1 week(s).    Masood Hoffman, PharmD

## 2020-01-06 ENCOUNTER — ANTICOAGULATION MONITORING (OUTPATIENT)
Dept: VASCULAR LAB | Facility: MEDICAL CENTER | Age: 80
End: 2020-01-06

## 2020-01-06 ENCOUNTER — HOSPITAL ENCOUNTER (OUTPATIENT)
Dept: LAB | Facility: MEDICAL CENTER | Age: 80
End: 2020-01-06
Attending: NURSE PRACTITIONER
Payer: MEDICARE

## 2020-01-06 DIAGNOSIS — Z86.79 HISTORY OF ATRIAL FLUTTER: ICD-10-CM

## 2020-01-06 DIAGNOSIS — I48.92 ATRIAL FLUTTER, UNSPECIFIED TYPE (HCC): ICD-10-CM

## 2020-01-06 LAB
INR PPP: 2.56 (ref 0.87–1.13)
PROTHROMBIN TIME: 28.4 SEC (ref 12–14.6)

## 2020-01-06 PROCEDURE — 85610 PROTHROMBIN TIME: CPT

## 2020-01-06 PROCEDURE — 36415 COLL VENOUS BLD VENIPUNCTURE: CPT

## 2020-01-06 RX ORDER — ROPINIROLE 0.5 MG/1
TABLET, FILM COATED ORAL
Qty: 180 TAB | Refills: 2 | Status: SHIPPED
Start: 2020-01-06 | End: 2020-07-04

## 2020-01-06 RX ORDER — OMEPRAZOLE 40 MG/1
CAPSULE, DELAYED RELEASE ORAL
Qty: 30 CAP | Refills: 2 | Status: SHIPPED | OUTPATIENT
Start: 2020-01-06 | End: 2020-05-01

## 2020-01-07 NOTE — PROGRESS NOTES
Anticoagulation Summary  As of 2020    INR goal:   2.0-3.0   TTR:   56.7 % (5 mo)   INR used for dosin.56 (2020)   Warfarin maintenance plan:   7.5 mg (5 mg x 1.5) every Mon, Fri; 5 mg (5 mg x 1) all other days   Weekly warfarin total:   40 mg   Plan last modified:   Masood Hoffman PharmD (2019)   Next INR check:   2020   Target end date:   Indefinite    Indications    History of atrial flutter [Z86.79]             Anticoagulation Episode Summary     INR check location:   Anticoagulation Clinic    Preferred lab:       Send INR reminders to:       Comments:   [facility]      Anticoagulation Care Providers     Provider Role Specialty Phone number    [facility]   [phone]    Martha Light M.D.  Family Medicine [phone]        Anticoagulation Patient Findings      INR  therapeutic.   Left a voice message for the patient, asked patient to please call the anticoagulation clinic if they have any signs/symptoms of bleeding and/or thrombosis or any changes to diet or medications.    Follow up appointment in 1 week(s)    Continue weekly warfarin dose as noted      Gaudencio Page, PharmD, MS, BCACP, [facility]    This note was created using voice recognition software (Dragon). The accuracy of the dictation is limited by the abilities of the software. I have reviewed the note prior to signing, however some errors in grammar and context are still possible. If you have any questions related to this note please do not hesitate to contact our office.

## 2020-01-13 ENCOUNTER — TELEPHONE (OUTPATIENT)
Dept: VASCULAR LAB | Facility: MEDICAL CENTER | Age: 80
End: 2020-01-13

## 2020-01-13 LAB — INR PPP: 2 (ref 2–3.5)

## 2020-01-14 NOTE — TELEPHONE ENCOUNTER
Patient LM to discuss upcoming angiogram and INR requiring to be at 2.0.  Returned call and had to LM as no answer to call.  Asked that he call back at opening of business tomorrow to discuss.  Federico Cerda, PharmD, BCACP

## 2020-01-15 ENCOUNTER — ANTICOAGULATION MONITORING (OUTPATIENT)
Dept: VASCULAR LAB | Facility: MEDICAL CENTER | Age: 80
End: 2020-01-15

## 2020-01-15 ENCOUNTER — TELEPHONE (OUTPATIENT)
Dept: VASCULAR LAB | Facility: MEDICAL CENTER | Age: 80
End: 2020-01-15

## 2020-01-15 DIAGNOSIS — Z86.79 HISTORY OF ATRIAL FLUTTER: ICD-10-CM

## 2020-01-15 NOTE — PROGRESS NOTES
"LM for [place] @ [facility] to clarify what \"INR order\" they are in need of.  Asked her to call back and speak to provider in office.  Federico Cerda, PharmD, BCACP    "

## 2020-01-15 NOTE — TELEPHONE ENCOUNTER
----- Message from Keyla Ag sent at 1/15/2020  1:29 PM PST -----  Regarding: INR Order  [facility]  requesting patients INR order to be faxed to FAX:[phone] Please call [phone] with any questions Thank you

## 2020-01-17 ENCOUNTER — HOSPITAL ENCOUNTER (OUTPATIENT)
Dept: LAB | Facility: MEDICAL CENTER | Age: 80
End: 2020-01-17
Attending: NURSE PRACTITIONER
Payer: MEDICARE

## 2020-01-17 ENCOUNTER — HOSPITAL ENCOUNTER (OUTPATIENT)
Dept: LAB | Facility: MEDICAL CENTER | Age: 80
End: 2020-01-17
Attending: SURGERY
Payer: MEDICARE

## 2020-01-17 DIAGNOSIS — I48.92 ATRIAL FLUTTER, UNSPECIFIED TYPE (HCC): ICD-10-CM

## 2020-01-17 LAB
ANION GAP SERPL CALC-SCNC: 11 MMOL/L (ref 0–11.9)
ANISOCYTOSIS BLD QL SMEAR: ABNORMAL
BASOPHILS # BLD AUTO: 0.6 % (ref 0–1.8)
BASOPHILS # BLD: 0.02 K/UL (ref 0–0.12)
BUN SERPL-MCNC: 69 MG/DL (ref 8–22)
CALCIUM SERPL-MCNC: 10.3 MG/DL (ref 8.5–10.5)
CHLORIDE SERPL-SCNC: 97 MMOL/L (ref 96–112)
CO2 SERPL-SCNC: 28 MMOL/L (ref 20–33)
COMMENT 1642: NORMAL
CREAT SERPL-MCNC: 8.94 MG/DL (ref 0.5–1.4)
EOSINOPHIL # BLD AUTO: 0.22 K/UL (ref 0–0.51)
EOSINOPHIL NFR BLD: 6.6 % (ref 0–6.9)
ERYTHROCYTE [DISTWIDTH] IN BLOOD BY AUTOMATED COUNT: 66.7 FL (ref 35.9–50)
GLUCOSE SERPL-MCNC: 81 MG/DL (ref 65–99)
HCT VFR BLD AUTO: 37 % (ref 42–52)
HGB BLD-MCNC: 11.3 G/DL (ref 14–18)
HYPOCHROMIA BLD QL SMEAR: ABNORMAL
IMM GRANULOCYTES # BLD AUTO: 0 K/UL (ref 0–0.11)
IMM GRANULOCYTES NFR BLD AUTO: 0 % (ref 0–0.9)
INR PPP: 1.6 (ref 0.87–1.13)
LYMPHOCYTES # BLD AUTO: 0.64 K/UL (ref 1–4.8)
LYMPHOCYTES NFR BLD: 19.3 % (ref 22–41)
MACROCYTES BLD QL SMEAR: ABNORMAL
MCH RBC QN AUTO: 30.6 PG (ref 27–33)
MCHC RBC AUTO-ENTMCNC: 30.5 G/DL (ref 33.7–35.3)
MCV RBC AUTO: 100.3 FL (ref 81.4–97.8)
MONOCYTES # BLD AUTO: 0.38 K/UL (ref 0–0.85)
MONOCYTES NFR BLD AUTO: 11.4 % (ref 0–13.4)
MORPHOLOGY BLD-IMP: NORMAL
NEUTROPHILS # BLD AUTO: 2.06 K/UL (ref 1.82–7.42)
NEUTROPHILS NFR BLD: 62.1 % (ref 44–72)
NRBC # BLD AUTO: 0 K/UL
NRBC BLD-RTO: 0 /100 WBC
PLATELET # BLD AUTO: 161 K/UL (ref 164–446)
PLATELET BLD QL SMEAR: NORMAL
PMV BLD AUTO: 10.6 FL (ref 9–12.9)
POIKILOCYTOSIS BLD QL SMEAR: NORMAL
POLYCHROMASIA BLD QL SMEAR: NORMAL
POTASSIUM SERPL-SCNC: 4.7 MMOL/L (ref 3.6–5.5)
PROTHROMBIN TIME: 19.5 SEC (ref 12–14.6)
RBC # BLD AUTO: 3.69 M/UL (ref 4.7–6.1)
RBC BLD AUTO: PRESENT
SODIUM SERPL-SCNC: 136 MMOL/L (ref 135–145)
WBC # BLD AUTO: 3.3 K/UL (ref 4.8–10.8)

## 2020-01-17 PROCEDURE — 80048 BASIC METABOLIC PNL TOTAL CA: CPT

## 2020-01-17 PROCEDURE — 85610 PROTHROMBIN TIME: CPT

## 2020-01-17 PROCEDURE — 85025 COMPLETE CBC W/AUTO DIFF WBC: CPT

## 2020-01-17 PROCEDURE — 36415 COLL VENOUS BLD VENIPUNCTURE: CPT

## 2020-01-17 NOTE — PROGRESS NOTES
Spoke with Connie at [facility].  PT is scheduled for heart cath 01/30/2020.  PT will need to be off warfarin for ~5 days prior  Catina Seymour, Clinical Pharmacist, CDE, CACP

## 2020-01-20 ENCOUNTER — ANTICOAGULATION MONITORING (OUTPATIENT)
Dept: VASCULAR LAB | Facility: MEDICAL CENTER | Age: 80
End: 2020-01-20

## 2020-01-20 DIAGNOSIS — Z86.79 HISTORY OF ATRIAL FLUTTER: ICD-10-CM

## 2020-01-20 NOTE — PROGRESS NOTES
Anticoagulation Summary  As of 2020    INR goal:   2.0-3.0   TTR:   56.8 % (5.4 mo)   INR used for dosin.60! (2020)   Warfarin maintenance plan:   7.5 mg (5 mg x 1.5) every Mon, Fri; 5 mg (5 mg x 1) all other days   Weekly warfarin total:   40 mg   Plan last modified:   Gaudencio Page PharmD (2020)   Next INR check:   2020   Target end date:   Indefinite    Indications    History of atrial flutter [Z86.79]             Anticoagulation Episode Summary     INR check location:   Anticoagulation Clinic    Preferred lab:       Send INR reminders to:       Comments:   [facility] lab      Anticoagulation Care Providers     Provider Role Specialty Phone number    [facility]   [phone]    Martha Light M.D.  Family Medicine [phone]        Anticoagulation Patient Findings    Spoke to patient on the phone.   INR  sub-therapeutic.  He has an angiogram scheduled on the  and they do not want his INR increasing over 2.  Based on the conversation with the patient he would prefer keeping the weekly dose the same and getting another INR in approximately 5 days.  Denies signs/symptoms of bleeding and/or thrombosis.   Denies changes to diet or medications.   Follow up appointment in 1 week(s) from his last INR.    Continue weekly warfarin dose as noted      Gaudencio Page PharmD, MS, BCACP, [facility]    This note was created using voice recognition software (Dragon). The accuracy of the dictation is limited by the abilities of the software. I have reviewed the note prior to signing, however some errors in grammar and context are still possible. If you have any questions related to this note please do not hesitate to contact our office.

## 2020-01-24 ENCOUNTER — ANTICOAGULATION MONITORING (OUTPATIENT)
Dept: VASCULAR LAB | Facility: MEDICAL CENTER | Age: 80
End: 2020-01-24

## 2020-01-24 ENCOUNTER — HOSPITAL ENCOUNTER (OUTPATIENT)
Dept: LAB | Facility: MEDICAL CENTER | Age: 80
End: 2020-01-24
Attending: INTERNAL MEDICINE
Payer: MEDICARE

## 2020-01-24 ENCOUNTER — HOSPITAL ENCOUNTER (OUTPATIENT)
Dept: LAB | Facility: MEDICAL CENTER | Age: 80
End: 2020-01-24
Attending: NURSE PRACTITIONER
Payer: MEDICARE

## 2020-01-24 ENCOUNTER — HOSPITAL ENCOUNTER (OUTPATIENT)
Dept: LAB | Facility: MEDICAL CENTER | Age: 80
End: 2020-01-24
Attending: SURGERY
Payer: MEDICARE

## 2020-01-24 DIAGNOSIS — Z86.79 HISTORY OF ATRIAL FLUTTER: ICD-10-CM

## 2020-01-24 DIAGNOSIS — I48.92 ATRIAL FLUTTER, UNSPECIFIED TYPE (HCC): ICD-10-CM

## 2020-01-24 LAB
ALBUMIN SERPL BCP-MCNC: 3.8 G/DL (ref 3.2–4.9)
ALBUMIN/GLOB SERPL: 1.2 G/DL
ALP SERPL-CCNC: 115 U/L (ref 30–99)
ALT SERPL-CCNC: 13 U/L (ref 2–50)
ANION GAP SERPL CALC-SCNC: 14 MMOL/L (ref 0–11.9)
ANION GAP SERPL CALC-SCNC: 14 MMOL/L (ref 0–11.9)
ANISOCYTOSIS BLD QL SMEAR: ABNORMAL
ANISOCYTOSIS BLD QL SMEAR: ABNORMAL
AST SERPL-CCNC: 17 U/L (ref 12–45)
BASOPHILS # BLD AUTO: 0.5 % (ref 0–1.8)
BASOPHILS # BLD AUTO: 1 % (ref 0–1.8)
BASOPHILS # BLD: 0.02 K/UL (ref 0–0.12)
BASOPHILS # BLD: 0.04 K/UL (ref 0–0.12)
BILIRUB SERPL-MCNC: 0.5 MG/DL (ref 0.1–1.5)
BUN SERPL-MCNC: 69 MG/DL (ref 8–22)
BUN SERPL-MCNC: 69 MG/DL (ref 8–22)
CALCIUM SERPL-MCNC: 10.6 MG/DL (ref 8.5–10.5)
CALCIUM SERPL-MCNC: 10.6 MG/DL (ref 8.5–10.5)
CHLORIDE SERPL-SCNC: 95 MMOL/L (ref 96–112)
CHLORIDE SERPL-SCNC: 95 MMOL/L (ref 96–112)
CO2 SERPL-SCNC: 27 MMOL/L (ref 20–33)
CO2 SERPL-SCNC: 27 MMOL/L (ref 20–33)
COMMENT 1642: NORMAL
COMMENT 1642: NORMAL
CREAT SERPL-MCNC: 8.21 MG/DL (ref 0.5–1.4)
CREAT SERPL-MCNC: 8.31 MG/DL (ref 0.5–1.4)
EOSINOPHIL # BLD AUTO: 0.13 K/UL (ref 0–0.51)
EOSINOPHIL # BLD AUTO: 0.13 K/UL (ref 0–0.51)
EOSINOPHIL NFR BLD: 3.2 % (ref 0–6.9)
EOSINOPHIL NFR BLD: 3.2 % (ref 0–6.9)
ERYTHROCYTE [DISTWIDTH] IN BLOOD BY AUTOMATED COUNT: 65.9 FL (ref 35.9–50)
ERYTHROCYTE [DISTWIDTH] IN BLOOD BY AUTOMATED COUNT: 66.3 FL (ref 35.9–50)
GLOBULIN SER CALC-MCNC: 3.2 G/DL (ref 1.9–3.5)
GLUCOSE SERPL-MCNC: 87 MG/DL (ref 65–99)
GLUCOSE SERPL-MCNC: 88 MG/DL (ref 65–99)
HCT VFR BLD AUTO: 33.8 % (ref 42–52)
HCT VFR BLD AUTO: 34.1 % (ref 42–52)
HGB BLD-MCNC: 10.3 G/DL (ref 14–18)
HGB BLD-MCNC: 10.5 G/DL (ref 14–18)
IMM GRANULOCYTES # BLD AUTO: 0.02 K/UL (ref 0–0.11)
IMM GRANULOCYTES # BLD AUTO: 0.05 K/UL (ref 0–0.11)
IMM GRANULOCYTES NFR BLD AUTO: 0.5 % (ref 0–0.9)
IMM GRANULOCYTES NFR BLD AUTO: 1.2 % (ref 0–0.9)
INR PPP: 1.46 (ref 0.87–1.13)
LYMPHOCYTES # BLD AUTO: 0.62 K/UL (ref 1–4.8)
LYMPHOCYTES # BLD AUTO: 0.62 K/UL (ref 1–4.8)
LYMPHOCYTES NFR BLD: 15.1 % (ref 22–41)
LYMPHOCYTES NFR BLD: 15.2 % (ref 22–41)
MACROCYTES BLD QL SMEAR: ABNORMAL
MCH RBC QN AUTO: 30.6 PG (ref 27–33)
MCH RBC QN AUTO: 31.3 PG (ref 27–33)
MCHC RBC AUTO-ENTMCNC: 30.2 G/DL (ref 33.7–35.3)
MCHC RBC AUTO-ENTMCNC: 31.1 G/DL (ref 33.7–35.3)
MCV RBC AUTO: 100.9 FL (ref 81.4–97.8)
MCV RBC AUTO: 101.2 FL (ref 81.4–97.8)
MICROCYTES BLD QL SMEAR: ABNORMAL
MONOCYTES # BLD AUTO: 0.48 K/UL (ref 0–0.85)
MONOCYTES # BLD AUTO: 0.49 K/UL (ref 0–0.85)
MONOCYTES NFR BLD AUTO: 11.7 % (ref 0–13.4)
MONOCYTES NFR BLD AUTO: 12 % (ref 0–13.4)
MORPHOLOGY BLD-IMP: NORMAL
MORPHOLOGY BLD-IMP: NORMAL
NEUTROPHILS # BLD AUTO: 2.79 K/UL (ref 1.82–7.42)
NEUTROPHILS # BLD AUTO: 2.8 K/UL (ref 1.82–7.42)
NEUTROPHILS NFR BLD: 67.8 % (ref 44–72)
NEUTROPHILS NFR BLD: 68.6 % (ref 44–72)
NRBC # BLD AUTO: 0 K/UL
NRBC # BLD AUTO: 0 K/UL
NRBC BLD-RTO: 0 /100 WBC
NRBC BLD-RTO: 0 /100 WBC
OVALOCYTES BLD QL SMEAR: NORMAL
OVALOCYTES BLD QL SMEAR: NORMAL
PLATELET # BLD AUTO: 205 K/UL (ref 164–446)
PLATELET # BLD AUTO: 209 K/UL (ref 164–446)
PLATELET BLD QL SMEAR: NORMAL
PLATELET BLD QL SMEAR: NORMAL
PMV BLD AUTO: 10 FL (ref 9–12.9)
PMV BLD AUTO: 9.5 FL (ref 9–12.9)
POIKILOCYTOSIS BLD QL SMEAR: NORMAL
POIKILOCYTOSIS BLD QL SMEAR: NORMAL
POLYCHROMASIA BLD QL SMEAR: NORMAL
POTASSIUM SERPL-SCNC: 4.9 MMOL/L (ref 3.6–5.5)
POTASSIUM SERPL-SCNC: 4.9 MMOL/L (ref 3.6–5.5)
PROT SERPL-MCNC: 7 G/DL (ref 6–8.2)
PROTHROMBIN TIME: 18.1 SEC (ref 12–14.6)
RBC # BLD AUTO: 3.35 M/UL (ref 4.7–6.1)
RBC # BLD AUTO: 3.37 M/UL (ref 4.7–6.1)
RBC BLD AUTO: PRESENT
RBC BLD AUTO: PRESENT
SODIUM SERPL-SCNC: 136 MMOL/L (ref 135–145)
SODIUM SERPL-SCNC: 136 MMOL/L (ref 135–145)
WBC # BLD AUTO: 4.1 K/UL (ref 4.8–10.8)
WBC # BLD AUTO: 4.1 K/UL (ref 4.8–10.8)

## 2020-01-24 PROCEDURE — 80048 BASIC METABOLIC PNL TOTAL CA: CPT

## 2020-01-24 PROCEDURE — 85610 PROTHROMBIN TIME: CPT

## 2020-01-24 PROCEDURE — 85025 COMPLETE CBC W/AUTO DIFF WBC: CPT | Mod: 91

## 2020-01-24 PROCEDURE — 36415 COLL VENOUS BLD VENIPUNCTURE: CPT

## 2020-01-24 PROCEDURE — 80053 COMPREHEN METABOLIC PANEL: CPT

## 2020-01-24 PROCEDURE — 85025 COMPLETE CBC W/AUTO DIFF WBC: CPT

## 2020-01-24 NOTE — PROGRESS NOTES
Ronny called because he is having a angiogram and needs his INR to be less than 2.  Currently got a INR today at [facility] at approximate 11:00.

## 2020-01-24 NOTE — PROGRESS NOTES
Anticoagulation Summary  As of 2020    INR goal:   2.0-3.0   TTR:   54.5 % (5.6 mo)   INR used for dosin.46! (2020)   Warfarin maintenance plan:   7.5 mg (5 mg x 1.5) every Mon, Fri; 5 mg (5 mg x 1) all other days   Weekly warfarin total:   40 mg   Plan last modified:   Danae EstebanD (2020)   Next INR check:   2020   Target end date:   Indefinite    Indications    History of atrial flutter [Z86.79]             Anticoagulation Episode Summary     INR check location:   Anticoagulation Clinic    Preferred lab:       Send INR reminders to:       Comments:   [facility] lab      Anticoagulation Care Providers     Provider Role Specialty Phone number    [facility]   [phone]    Martha Light M.D.  Family Medicine [phone]        Anticoagulation Patient Findings  Patient Findings     Positives:   Upcoming invasive procedure    Negatives:   Signs/symptoms of thrombosis, Signs/symptoms of bleeding, Laboratory test error suspected, Change in health, Change in alcohol use, Change in activity, Emergency department visit, Upcoming dental procedure, Missed doses, Extra doses, Change in medications, Change in diet/appetite, Hospital admission, Bruising, Other complaints    Comments:   Angiogram scheduled 20        Spoke with patient today regarding subtherapeutic INR of 1.46.  Patient denies any signs/symptoms of bruising or bleeding or any changes in diet and medications.  Instructed patient to call clinic with any questions or concerns.  Angiogram scheduled for 20, patient denies missed doses.  He has been instructed to have INR at or slightly below 2.0 for procedure.  Will have him take small bolus of 10mg today, then resume current warfarin regimen.  Follow up in 1 weeks, to reduce risk of adverse events related to this high risk medication,  Warfarin.    Federico Cerda, DanaeD, BCACP

## 2020-01-28 ENCOUNTER — ANTICOAGULATION MONITORING (OUTPATIENT)
Dept: VASCULAR LAB | Facility: MEDICAL CENTER | Age: 80
End: 2020-01-28

## 2020-01-28 DIAGNOSIS — Z86.79 HISTORY OF ATRIAL FLUTTER: ICD-10-CM

## 2020-01-28 NOTE — PROGRESS NOTES
Ronny is having a angiogram on the 30th.  And his INR should be less than 2.  I recommended that he get an INR today or tomorrow so that we can warfarin.

## 2020-01-29 ENCOUNTER — HOSPITAL ENCOUNTER (OUTPATIENT)
Dept: LAB | Facility: MEDICAL CENTER | Age: 80
End: 2020-01-29
Attending: NURSE PRACTITIONER
Payer: MEDICARE

## 2020-01-29 ENCOUNTER — ANTICOAGULATION MONITORING (OUTPATIENT)
Dept: VASCULAR LAB | Facility: MEDICAL CENTER | Age: 80
End: 2020-01-29

## 2020-01-29 DIAGNOSIS — I48.92 ATRIAL FLUTTER, UNSPECIFIED TYPE (HCC): ICD-10-CM

## 2020-01-29 DIAGNOSIS — Z86.79 HISTORY OF ATRIAL FLUTTER: ICD-10-CM

## 2020-01-29 LAB
INR PPP: 1.65 (ref 0.87–1.13)
PROTHROMBIN TIME: 20 SEC (ref 12–14.6)

## 2020-01-29 PROCEDURE — 36415 COLL VENOUS BLD VENIPUNCTURE: CPT

## 2020-01-29 PROCEDURE — 85610 PROTHROMBIN TIME: CPT

## 2020-01-30 ENCOUNTER — HOSPITAL ENCOUNTER (OUTPATIENT)
Dept: HOSPITAL 8 - CACL | Age: 80
Setting detail: OBSERVATION
LOS: 1 days | Discharge: HOME | End: 2020-01-31
Attending: INTERNAL MEDICINE | Admitting: INTERNAL MEDICINE
Payer: MEDICARE

## 2020-01-30 VITALS — HEIGHT: 68 IN | WEIGHT: 152.12 LBS | BODY MASS INDEX: 23.05 KG/M2

## 2020-01-30 VITALS — DIASTOLIC BLOOD PRESSURE: 78 MMHG | SYSTOLIC BLOOD PRESSURE: 156 MMHG

## 2020-01-30 VITALS — DIASTOLIC BLOOD PRESSURE: 72 MMHG | SYSTOLIC BLOOD PRESSURE: 173 MMHG

## 2020-01-30 VITALS — DIASTOLIC BLOOD PRESSURE: 52 MMHG | SYSTOLIC BLOOD PRESSURE: 185 MMHG

## 2020-01-30 VITALS — SYSTOLIC BLOOD PRESSURE: 191 MMHG | DIASTOLIC BLOOD PRESSURE: 77 MMHG

## 2020-01-30 VITALS — SYSTOLIC BLOOD PRESSURE: 181 MMHG | DIASTOLIC BLOOD PRESSURE: 66 MMHG

## 2020-01-30 DIAGNOSIS — N18.6: ICD-10-CM

## 2020-01-30 DIAGNOSIS — I25.10: ICD-10-CM

## 2020-01-30 DIAGNOSIS — I35.0: Primary | ICD-10-CM

## 2020-01-30 DIAGNOSIS — Z99.2: ICD-10-CM

## 2020-01-30 LAB
INR PPP: 2.09 (ref 0.93–1.1)
PROTHROMBIN TIME: 22.3 SECONDS (ref 9.6–11.5)

## 2020-01-30 PROCEDURE — C1874 STENT, COATED/COV W/DEL SYS: HCPCS

## 2020-01-30 PROCEDURE — C1760 CLOSURE DEV, VASC: HCPCS

## 2020-01-30 PROCEDURE — C1887 CATHETER, GUIDING: HCPCS

## 2020-01-30 PROCEDURE — 99157 MOD SED OTHER PHYS/QHP EA: CPT

## 2020-01-30 PROCEDURE — 85018 HEMOGLOBIN: CPT

## 2020-01-30 PROCEDURE — C1894 INTRO/SHEATH, NON-LASER: HCPCS

## 2020-01-30 PROCEDURE — C9602 PERC D-E COR STENT ATHER S: HCPCS

## 2020-01-30 PROCEDURE — C1725 CATH, TRANSLUMIN NON-LASER: HCPCS

## 2020-01-30 PROCEDURE — G0378 HOSPITAL OBSERVATION PER HR: HCPCS

## 2020-01-30 PROCEDURE — 96374 THER/PROPH/DIAG INJ IV PUSH: CPT

## 2020-01-30 PROCEDURE — 36415 COLL VENOUS BLD VENIPUNCTURE: CPT

## 2020-01-30 PROCEDURE — 85014 HEMATOCRIT: CPT

## 2020-01-30 PROCEDURE — 99156 MOD SED OTH PHYS/QHP 5/>YRS: CPT

## 2020-01-30 PROCEDURE — 93454 CORONARY ARTERY ANGIO S&I: CPT

## 2020-01-30 PROCEDURE — C1724 CATH, TRANS ATHEREC,ROTATION: HCPCS

## 2020-01-30 PROCEDURE — 85610 PROTHROMBIN TIME: CPT

## 2020-01-30 PROCEDURE — C1769 GUIDE WIRE: HCPCS

## 2020-01-30 PROCEDURE — 80048 BASIC METABOLIC PNL TOTAL CA: CPT

## 2020-01-30 PROCEDURE — 96376 TX/PRO/DX INJ SAME DRUG ADON: CPT

## 2020-01-30 RX ADMIN — HYDRALAZINE HYDROCHLORIDE PRN MG: 20 INJECTION INTRAMUSCULAR; INTRAVENOUS at 18:33

## 2020-01-30 RX ADMIN — HYDRALAZINE HYDROCHLORIDE PRN MG: 20 INJECTION INTRAMUSCULAR; INTRAVENOUS at 15:30

## 2020-01-30 NOTE — PROGRESS NOTES
Anticoagulation Summary  As of 2020    INR goal:   2.0-3.0   TTR:   52.9 % (5.8 mo)   INR used for dosin.65! (2020)   Warfarin maintenance plan:   7.5 mg (5 mg x 1.5) every Mon, Fri; 5 mg (5 mg x 1) all other days   Weekly warfarin total:   40 mg   Plan last modified:   Gaudencio Page, PharmD (2020)   Next INR check:   2020   Target end date:   Indefinite    Indications    History of atrial flutter [Z86.79]             Anticoagulation Episode Summary     INR check location:   Anticoagulation Clinic    Preferred lab:       Send INR reminders to:       Comments:   [facility] lab      Anticoagulation Care Providers     Provider Role Specialty Phone number    [facility]   [phone]    Martha Light M.D.  Family Medicine [phone]        Anticoagulation Patient Findings    HPI:  Parmjit Castelan, on anticoagulation therapy with warfarin for Atrial flutter.  Changes to current medical/health status since last appt: none  Denies signs/symptoms of bleeding and/or thrombosis since the last appt.    Denies any interval changes to diet  Denies any interval changes to medications since last appt.   Denies any complications or cost restrictions with current therapy.     A/P   INR  SUB-therapeutic.     Pt has angiogram scheduled for tomorrow. Pt instructed to be below 2.0 prior to procedure. INR today is 1.65. Pt wife instructed to have Pt take 2 tablets tomorrow and return to current regimen.    Next INR in 1 week(s).    Sudeep Jeffers, Pharmacy Intern

## 2020-01-31 VITALS — SYSTOLIC BLOOD PRESSURE: 185 MMHG | DIASTOLIC BLOOD PRESSURE: 53 MMHG

## 2020-01-31 VITALS — DIASTOLIC BLOOD PRESSURE: 74 MMHG | SYSTOLIC BLOOD PRESSURE: 168 MMHG

## 2020-01-31 VITALS — DIASTOLIC BLOOD PRESSURE: 77 MMHG | SYSTOLIC BLOOD PRESSURE: 189 MMHG

## 2020-01-31 VITALS — DIASTOLIC BLOOD PRESSURE: 62 MMHG | SYSTOLIC BLOOD PRESSURE: 177 MMHG

## 2020-01-31 LAB
ANION GAP SERPL CALC-SCNC: 9 MMOL/L (ref 5–15)
CALCIUM SERPL-MCNC: 10.2 MG/DL (ref 8.5–10.1)
CHLORIDE SERPL-SCNC: 100 MMOL/L (ref 98–107)
CREAT SERPL-MCNC: 8.56 MG/DL (ref 0.7–1.3)
INR PPP: 1.35 (ref 0.93–1.1)
PROTHROMBIN TIME: 14.3 SECONDS (ref 9.6–11.5)

## 2020-01-31 RX ADMIN — HYDRALAZINE HYDROCHLORIDE PRN MG: 20 INJECTION INTRAMUSCULAR; INTRAVENOUS at 00:08

## 2020-02-06 ENCOUNTER — APPOINTMENT (OUTPATIENT)
Dept: RADIOLOGY | Facility: MEDICAL CENTER | Age: 80
DRG: 811 | End: 2020-02-06
Attending: INTERNAL MEDICINE
Payer: MEDICARE

## 2020-02-06 ENCOUNTER — APPOINTMENT (OUTPATIENT)
Dept: RADIOLOGY | Facility: MEDICAL CENTER | Age: 80
End: 2020-02-06
Attending: EMERGENCY MEDICINE
Payer: MEDICARE

## 2020-02-06 ENCOUNTER — APPOINTMENT (OUTPATIENT)
Dept: RADIOLOGY | Facility: MEDICAL CENTER | Age: 80
DRG: 811 | End: 2020-02-06
Attending: EMERGENCY MEDICINE
Payer: MEDICARE

## 2020-02-06 ENCOUNTER — APPOINTMENT (OUTPATIENT)
Dept: CARDIOTHORACIC SURGERY | Facility: MEDICAL CENTER | Age: 80
End: 2020-02-06
Payer: MEDICARE

## 2020-02-06 ENCOUNTER — HOSPITAL ENCOUNTER (INPATIENT)
Facility: MEDICAL CENTER | Age: 80
LOS: 2 days | DRG: 811 | End: 2020-02-08
Attending: EMERGENCY MEDICINE | Admitting: HOSPITALIST
Payer: MEDICARE

## 2020-02-06 ENCOUNTER — HOSPITAL ENCOUNTER (EMERGENCY)
Facility: MEDICAL CENTER | Age: 80
End: 2020-02-06
Attending: EMERGENCY MEDICINE
Payer: MEDICARE

## 2020-02-06 VITALS
OXYGEN SATURATION: 99 % | HEIGHT: 68 IN | RESPIRATION RATE: 24 BRPM | TEMPERATURE: 96.9 F | BODY MASS INDEX: 24.59 KG/M2 | DIASTOLIC BLOOD PRESSURE: 47 MMHG | WEIGHT: 162.26 LBS | HEART RATE: 74 BPM | SYSTOLIC BLOOD PRESSURE: 128 MMHG

## 2020-02-06 PROBLEM — E87.6 HYPOKALEMIA: Status: RESOLVED | Noted: 2019-09-13 | Resolved: 2020-02-06

## 2020-02-06 PROBLEM — E87.5 HYPERKALEMIA: Status: ACTIVE | Noted: 2020-02-06

## 2020-02-06 PROBLEM — S40.021A CONTUSION OF RIGHT UPPER EXTREMITY: Status: ACTIVE | Noted: 2020-02-06

## 2020-02-06 PROBLEM — K92.1 GASTROINTESTINAL HEMORRHAGE WITH MELENA: Status: ACTIVE | Noted: 2020-02-06

## 2020-02-06 PROBLEM — D64.9 ANEMIA: Status: ACTIVE | Noted: 2020-02-06

## 2020-02-06 PROBLEM — T82.590A DIALYSIS AV FISTULA MALFUNCTION (HCC): Status: ACTIVE | Noted: 2020-02-06

## 2020-02-06 PROBLEM — N18.6 ESRD (END STAGE RENAL DISEASE) (HCC): Status: ACTIVE | Noted: 2020-02-06

## 2020-02-06 LAB
ABO GROUP BLD: NORMAL
ABO GROUP BLD: NORMAL
ALBUMIN SERPL BCP-MCNC: 3.5 G/DL (ref 3.2–4.9)
ALBUMIN SERPL BCP-MCNC: 3.8 G/DL (ref 3.2–4.9)
ALBUMIN/GLOB SERPL: 1.4 G/DL
ALBUMIN/GLOB SERPL: 1.7 G/DL
ALP SERPL-CCNC: 80 U/L (ref 30–99)
ALP SERPL-CCNC: 93 U/L (ref 30–99)
ALT SERPL-CCNC: 15 U/L (ref 2–50)
ALT SERPL-CCNC: 15 U/L (ref 2–50)
ANION GAP SERPL CALC-SCNC: 23 MMOL/L (ref 0–11.9)
ANION GAP SERPL CALC-SCNC: 26 MMOL/L (ref 0–11.9)
ANISOCYTOSIS BLD QL SMEAR: ABNORMAL
APTT PPP: 30.2 SEC (ref 24.7–36)
AST SERPL-CCNC: 13 U/L (ref 12–45)
AST SERPL-CCNC: 17 U/L (ref 12–45)
BARCODED ABORH UBTYP: 5100
BARCODED PRD CODE UBPRD: NORMAL
BARCODED UNIT NUM UBUNT: NORMAL
BASE EXCESS BLDA CALC-SCNC: -8 MMOL/L (ref -4–3)
BASOPHILS # BLD AUTO: 0.2 % (ref 0–1.8)
BASOPHILS # BLD AUTO: 0.2 % (ref 0–1.8)
BASOPHILS # BLD: 0.01 K/UL (ref 0–0.12)
BASOPHILS # BLD: 0.02 K/UL (ref 0–0.12)
BILIRUB SERPL-MCNC: 0.3 MG/DL (ref 0.1–1.5)
BILIRUB SERPL-MCNC: 0.5 MG/DL (ref 0.1–1.5)
BLD GP AB SCN SERPL QL: NORMAL
BLD GP AB SCN SERPL QL: NORMAL
BODY TEMPERATURE: ABNORMAL CENTIGRADE
BUN SERPL-MCNC: 132 MG/DL (ref 8–22)
BUN SERPL-MCNC: 150 MG/DL (ref 8–22)
BURR CELLS BLD QL SMEAR: NORMAL
CALCIUM SERPL-MCNC: 10.7 MG/DL (ref 8.5–10.5)
CALCIUM SERPL-MCNC: 10.9 MG/DL (ref 8.4–10.2)
CFT BLD TEG: 5.4 MIN (ref 5–10)
CFT BLD TEG: 6.2 MIN (ref 5–10)
CHLORIDE SERPL-SCNC: 93 MMOL/L (ref 96–112)
CHLORIDE SERPL-SCNC: 95 MMOL/L (ref 96–112)
CLOT ANGLE BLD TEG: 68.2 DEGREES (ref 53–72)
CLOT ANGLE BLD TEG: 72.8 DEGREES (ref 53–72)
CLOT LYSIS 30M P MA LENFR BLD TEG: 0 % (ref 0–8)
CLOT LYSIS 30M P MA LENFR BLD TEG: 0.8 % (ref 0–8)
CO2 SERPL-SCNC: 17 MMOL/L (ref 20–33)
CO2 SERPL-SCNC: 19 MMOL/L (ref 20–33)
COMMENT 1642: NORMAL
COMPONENT R 8504R: NORMAL
CREAT SERPL-MCNC: 9.31 MG/DL (ref 0.5–1.4)
CREAT SERPL-MCNC: 9.45 MG/DL (ref 0.5–1.4)
CT.EXTRINSIC BLD ROTEM: 1.2 MIN (ref 1–3)
CT.EXTRINSIC BLD ROTEM: 1.5 MIN (ref 1–3)
EKG IMPRESSION: NORMAL
EKG IMPRESSION: NORMAL
EOSINOPHIL # BLD AUTO: 0.04 K/UL (ref 0–0.51)
EOSINOPHIL # BLD AUTO: 0.05 K/UL (ref 0–0.51)
EOSINOPHIL NFR BLD: 0.6 % (ref 0–6.9)
EOSINOPHIL NFR BLD: 0.7 % (ref 0–6.9)
ERYTHROCYTE [DISTWIDTH] IN BLOOD BY AUTOMATED COUNT: 63.3 FL (ref 35.9–50)
ERYTHROCYTE [DISTWIDTH] IN BLOOD BY AUTOMATED COUNT: 65.9 FL (ref 35.9–50)
GLOBULIN SER CALC-MCNC: 2.3 G/DL (ref 1.9–3.5)
GLOBULIN SER CALC-MCNC: 2.5 G/DL (ref 1.9–3.5)
GLUCOSE SERPL-MCNC: 114 MG/DL (ref 65–99)
GLUCOSE SERPL-MCNC: 97 MG/DL (ref 65–99)
HAV IGM SERPL QL IA: NEGATIVE
HBV CORE IGM SER QL: NEGATIVE
HBV SURFACE AB SERPL IA-ACNC: 12.98 MIU/ML (ref 0–10)
HBV SURFACE AG SER QL: NEGATIVE
HCO3 BLDA-SCNC: 15 MMOL/L (ref 17–25)
HCT VFR BLD AUTO: 18 % (ref 42–52)
HCT VFR BLD AUTO: 20 % (ref 42–52)
HCT VFR BLD AUTO: 29.3 % (ref 42–52)
HCV AB SER QL: NEGATIVE
HGB BLD-MCNC: 5.7 G/DL (ref 14–18)
HGB BLD-MCNC: 6.1 G/DL (ref 14–18)
HGB BLD-MCNC: 9.4 G/DL (ref 14–18)
IMM GRANULOCYTES # BLD AUTO: 0.02 K/UL (ref 0–0.11)
IMM GRANULOCYTES # BLD AUTO: 0.05 K/UL (ref 0–0.11)
IMM GRANULOCYTES NFR BLD AUTO: 0.4 % (ref 0–0.9)
IMM GRANULOCYTES NFR BLD AUTO: 0.6 % (ref 0–0.9)
INR PPP: 1.68 (ref 0.87–1.13)
LYMPHOCYTES # BLD AUTO: 0.38 K/UL (ref 1–4.8)
LYMPHOCYTES # BLD AUTO: 0.99 K/UL (ref 1–4.8)
LYMPHOCYTES NFR BLD: 11.5 % (ref 22–41)
LYMPHOCYTES NFR BLD: 6.9 % (ref 22–41)
MACROCYTES BLD QL SMEAR: ABNORMAL
MAGNESIUM SERPL-MCNC: 1.7 MG/DL (ref 1.5–2.5)
MAGNESIUM SERPL-MCNC: 1.8 MG/DL (ref 1.5–2.5)
MCF BLD TEG: 67.8 MM (ref 50–70)
MCF BLD TEG: 71.4 MM (ref 50–70)
MCH RBC QN AUTO: 30.6 PG (ref 27–33)
MCH RBC QN AUTO: 31 PG (ref 27–33)
MCHC RBC AUTO-ENTMCNC: 30.5 G/DL (ref 33.7–35.3)
MCHC RBC AUTO-ENTMCNC: 31.3 G/DL (ref 33.7–35.3)
MCV RBC AUTO: 101.5 FL (ref 81.4–97.8)
MCV RBC AUTO: 97.8 FL (ref 81.4–97.8)
MICROCYTES BLD QL SMEAR: ABNORMAL
MONOCYTES # BLD AUTO: 0.31 K/UL (ref 0–0.85)
MONOCYTES # BLD AUTO: 0.53 K/UL (ref 0–0.85)
MONOCYTES NFR BLD AUTO: 5.7 % (ref 0–13.4)
MONOCYTES NFR BLD AUTO: 6.2 % (ref 0–13.4)
MORPHOLOGY BLD-IMP: NORMAL
NEUTROPHILS # BLD AUTO: 4.72 K/UL (ref 1.82–7.42)
NEUTROPHILS # BLD AUTO: 6.95 K/UL (ref 1.82–7.42)
NEUTROPHILS NFR BLD: 80.9 % (ref 44–72)
NEUTROPHILS NFR BLD: 86.1 % (ref 44–72)
NRBC # BLD AUTO: 0 K/UL
NRBC # BLD AUTO: 0.02 K/UL
NRBC BLD-RTO: 0 /100 WBC
NRBC BLD-RTO: 0.4 /100 WBC
OVALOCYTES BLD QL SMEAR: NORMAL
PA AA BLD-ACNC: 99.2 %
PA AA BLD-ACNC: 99.4 %
PA ADP BLD-ACNC: 74.2 %
PA ADP BLD-ACNC: 90.9 %
PCO2 BLDA: 21.7 MMHG (ref 26–37)
PH BLDA: 7.46 [PH] (ref 7.4–7.5)
PHOSPHATE SERPL-MCNC: 3.4 MG/DL (ref 2.5–4.5)
PLATELET # BLD AUTO: 178 K/UL (ref 164–446)
PLATELET # BLD AUTO: 234 K/UL (ref 164–446)
PLATELET BLD QL SMEAR: NORMAL
PMV BLD AUTO: 10.4 FL (ref 9–12.9)
PMV BLD AUTO: 10.6 FL (ref 9–12.9)
PO2 BLDA: 118.3 MMHG (ref 64–87)
POIKILOCYTOSIS BLD QL SMEAR: NORMAL
POTASSIUM SERPL-SCNC: 4.3 MMOL/L (ref 3.6–5.5)
POTASSIUM SERPL-SCNC: 5.7 MMOL/L (ref 3.6–5.5)
POTASSIUM SERPL-SCNC: 6.2 MMOL/L (ref 3.6–5.5)
PRODUCT TYPE UPROD: NORMAL
PROT SERPL-MCNC: 6 G/DL (ref 6–8.2)
PROT SERPL-MCNC: 6.1 G/DL (ref 6–8.2)
PROTHROMBIN TIME: 20.1 SEC (ref 12–14.6)
RBC # BLD AUTO: 1.83 M/UL (ref 4.7–6.1)
RBC # BLD AUTO: 1.97 M/UL (ref 4.7–6.1)
RBC BLD AUTO: PRESENT
RH BLD: NORMAL
RH BLD: NORMAL
SAO2 % BLDA: 97.7 % (ref 93–99)
SODIUM SERPL-SCNC: 136 MMOL/L (ref 135–145)
SODIUM SERPL-SCNC: 137 MMOL/L (ref 135–145)
TEG ALGORITHM TGALG: ABNORMAL
TEG ALGORITHM TGALG: NORMAL
UNIT STATUS USTAT: NORMAL
WBC # BLD AUTO: 5.5 K/UL (ref 4.8–10.8)
WBC # BLD AUTO: 8.6 K/UL (ref 4.8–10.8)

## 2020-02-06 PROCEDURE — 85347 COAGULATION TIME ACTIVATED: CPT | Mod: 91

## 2020-02-06 PROCEDURE — 82803 BLOOD GASES ANY COMBINATION: CPT

## 2020-02-06 PROCEDURE — 85384 FIBRINOGEN ACTIVITY: CPT | Mod: 91

## 2020-02-06 PROCEDURE — P9016 RBC LEUKOCYTES REDUCED: HCPCS | Mod: 91

## 2020-02-06 PROCEDURE — 99291 CRITICAL CARE FIRST HOUR: CPT | Performed by: INTERNAL MEDICINE

## 2020-02-06 PROCEDURE — 86901 BLOOD TYPING SEROLOGIC RH(D): CPT | Mod: 91

## 2020-02-06 PROCEDURE — 83735 ASSAY OF MAGNESIUM: CPT | Mod: 91

## 2020-02-06 PROCEDURE — 99223 1ST HOSP IP/OBS HIGH 75: CPT | Mod: AI | Performed by: HOSPITALIST

## 2020-02-06 PROCEDURE — 700117 HCHG RX CONTRAST REV CODE 255: Performed by: INTERNAL MEDICINE

## 2020-02-06 PROCEDURE — 99285 EMERGENCY DEPT VISIT HI MDM: CPT

## 2020-02-06 PROCEDURE — 90935 HEMODIALYSIS ONE EVALUATION: CPT

## 2020-02-06 PROCEDURE — 85576 BLOOD PLATELET AGGREGATION: CPT | Mod: 91

## 2020-02-06 PROCEDURE — 85384 FIBRINOGEN ACTIVITY: CPT

## 2020-02-06 PROCEDURE — 85014 HEMATOCRIT: CPT

## 2020-02-06 PROCEDURE — 94640 AIRWAY INHALATION TREATMENT: CPT

## 2020-02-06 PROCEDURE — 99291 CRITICAL CARE FIRST HOUR: CPT

## 2020-02-06 PROCEDURE — 94760 N-INVAS EAR/PLS OXIMETRY 1: CPT

## 2020-02-06 PROCEDURE — 700101 HCHG RX REV CODE 250

## 2020-02-06 PROCEDURE — 86850 RBC ANTIBODY SCREEN: CPT

## 2020-02-06 PROCEDURE — 93990 DOPPLER FLOW TESTING: CPT

## 2020-02-06 PROCEDURE — 80053 COMPREHEN METABOLIC PANEL: CPT

## 2020-02-06 PROCEDURE — 5A1D70Z PERFORMANCE OF URINARY FILTRATION, INTERMITTENT, LESS THAN 6 HOURS PER DAY: ICD-10-PCS | Performed by: INTERNAL MEDICINE

## 2020-02-06 PROCEDURE — 770022 HCHG ROOM/CARE - ICU (200)

## 2020-02-06 PROCEDURE — 74177 CT ABD & PELVIS W/CONTRAST: CPT

## 2020-02-06 PROCEDURE — 36430 TRANSFUSION BLD/BLD COMPNT: CPT

## 2020-02-06 PROCEDURE — 84132 ASSAY OF SERUM POTASSIUM: CPT

## 2020-02-06 PROCEDURE — 93990 DOPPLER FLOW TESTING: CPT | Mod: 26 | Performed by: INTERNAL MEDICINE

## 2020-02-06 PROCEDURE — 99222 1ST HOSP IP/OBS MODERATE 55: CPT | Performed by: INTERNAL MEDICINE

## 2020-02-06 PROCEDURE — 80053 COMPREHEN METABOLIC PANEL: CPT | Mod: 91

## 2020-02-06 PROCEDURE — 71045 X-RAY EXAM CHEST 1 VIEW: CPT

## 2020-02-06 PROCEDURE — 85018 HEMOGLOBIN: CPT

## 2020-02-06 PROCEDURE — C9113 INJ PANTOPRAZOLE SODIUM, VIA: HCPCS | Performed by: HOSPITALIST

## 2020-02-06 PROCEDURE — A9270 NON-COVERED ITEM OR SERVICE: HCPCS | Performed by: INTERNAL MEDICINE

## 2020-02-06 PROCEDURE — 93005 ELECTROCARDIOGRAM TRACING: CPT | Performed by: EMERGENCY MEDICINE

## 2020-02-06 PROCEDURE — 85730 THROMBOPLASTIN TIME PARTIAL: CPT

## 2020-02-06 PROCEDURE — 86850 RBC ANTIBODY SCREEN: CPT | Mod: 91

## 2020-02-06 PROCEDURE — 700102 HCHG RX REV CODE 250 W/ 637 OVERRIDE(OP): Performed by: HOSPITALIST

## 2020-02-06 PROCEDURE — 99292 CRITICAL CARE ADDL 30 MIN: CPT | Performed by: INTERNAL MEDICINE

## 2020-02-06 PROCEDURE — 700111 HCHG RX REV CODE 636 W/ 250 OVERRIDE (IP): Performed by: EMERGENCY MEDICINE

## 2020-02-06 PROCEDURE — 85025 COMPLETE CBC W/AUTO DIFF WBC: CPT | Mod: 91

## 2020-02-06 PROCEDURE — 86900 BLOOD TYPING SEROLOGIC ABO: CPT

## 2020-02-06 PROCEDURE — 86923 COMPATIBILITY TEST ELECTRIC: CPT | Mod: 91

## 2020-02-06 PROCEDURE — 83735 ASSAY OF MAGNESIUM: CPT

## 2020-02-06 PROCEDURE — 80074 ACUTE HEPATITIS PANEL: CPT

## 2020-02-06 PROCEDURE — 96374 THER/PROPH/DIAG INJ IV PUSH: CPT

## 2020-02-06 PROCEDURE — 85025 COMPLETE CBC W/AUTO DIFF WBC: CPT

## 2020-02-06 PROCEDURE — 700102 HCHG RX REV CODE 250 W/ 637 OVERRIDE(OP): Performed by: INTERNAL MEDICINE

## 2020-02-06 PROCEDURE — 86706 HEP B SURFACE ANTIBODY: CPT

## 2020-02-06 PROCEDURE — 86901 BLOOD TYPING SEROLOGIC RH(D): CPT

## 2020-02-06 PROCEDURE — 85347 COAGULATION TIME ACTIVATED: CPT

## 2020-02-06 PROCEDURE — 700101 HCHG RX REV CODE 250: Performed by: EMERGENCY MEDICINE

## 2020-02-06 PROCEDURE — 84100 ASSAY OF PHOSPHORUS: CPT

## 2020-02-06 PROCEDURE — 700111 HCHG RX REV CODE 636 W/ 250 OVERRIDE (IP): Performed by: HOSPITALIST

## 2020-02-06 PROCEDURE — A9270 NON-COVERED ITEM OR SERVICE: HCPCS | Performed by: HOSPITALIST

## 2020-02-06 PROCEDURE — 30233N0 TRANSFUSION OF AUTOLOGOUS RED BLOOD CELLS INTO PERIPHERAL VEIN, PERCUTANEOUS APPROACH: ICD-10-PCS | Performed by: INTERNAL MEDICINE

## 2020-02-06 PROCEDURE — 85610 PROTHROMBIN TIME: CPT

## 2020-02-06 RX ORDER — FLUTICASONE PROPIONATE 50 MCG
2 SPRAY, SUSPENSION (ML) NASAL DAILY
Status: DISCONTINUED | OUTPATIENT
Start: 2020-02-06 | End: 2020-02-08 | Stop reason: HOSPADM

## 2020-02-06 RX ORDER — ROPINIROLE 0.5 MG/1
1 TABLET, FILM COATED ORAL DAILY
Status: DISCONTINUED | OUTPATIENT
Start: 2020-02-06 | End: 2020-02-08 | Stop reason: HOSPADM

## 2020-02-06 RX ORDER — MONTELUKAST SODIUM 10 MG/1
10 TABLET ORAL EVERY EVENING
Status: DISCONTINUED | OUTPATIENT
Start: 2020-02-06 | End: 2020-02-08 | Stop reason: HOSPADM

## 2020-02-06 RX ORDER — LORAZEPAM 2 MG/ML
0.5 INJECTION INTRAMUSCULAR ONCE
Status: COMPLETED | OUTPATIENT
Start: 2020-02-06 | End: 2020-02-06

## 2020-02-06 RX ORDER — ALBUTEROL SULFATE 90 UG/1
1-2 AEROSOL, METERED RESPIRATORY (INHALATION) EVERY 4 HOURS PRN
Status: DISCONTINUED | OUTPATIENT
Start: 2020-02-06 | End: 2020-02-08 | Stop reason: HOSPADM

## 2020-02-06 RX ORDER — WARFARIN SODIUM 5 MG/1
5-7.5 TABLET ORAL SEE ADMIN INSTRUCTIONS
Status: ON HOLD | COMMUNITY
End: 2020-02-08

## 2020-02-06 RX ORDER — CLOPIDOGREL BISULFATE 75 MG/1
75 TABLET ORAL EVERY EVENING
Status: SHIPPED | COMMUNITY
End: 2020-10-29

## 2020-02-06 RX ORDER — PRAVASTATIN SODIUM 20 MG
20 TABLET ORAL NIGHTLY
Status: DISCONTINUED | OUTPATIENT
Start: 2020-02-06 | End: 2020-02-08 | Stop reason: HOSPADM

## 2020-02-06 RX ORDER — TRAZODONE HYDROCHLORIDE 50 MG/1
100 TABLET ORAL
Status: DISCONTINUED | OUTPATIENT
Start: 2020-02-06 | End: 2020-02-08 | Stop reason: HOSPADM

## 2020-02-06 RX ORDER — ONDANSETRON 4 MG/1
4 TABLET, ORALLY DISINTEGRATING ORAL EVERY 4 HOURS PRN
Status: DISCONTINUED | OUTPATIENT
Start: 2020-02-06 | End: 2020-02-06

## 2020-02-06 RX ORDER — OMEPRAZOLE 40 MG/1
40 CAPSULE, DELAYED RELEASE ORAL DAILY
Status: DISCONTINUED | OUTPATIENT
Start: 2020-02-06 | End: 2020-02-06

## 2020-02-06 RX ORDER — OXYCODONE HYDROCHLORIDE 5 MG/1
5 TABLET ORAL EVERY 4 HOURS PRN
Status: DISCONTINUED | OUTPATIENT
Start: 2020-02-06 | End: 2020-02-08 | Stop reason: HOSPADM

## 2020-02-06 RX ORDER — ALBUTEROL SULFATE 90 UG/1
1-2 AEROSOL, METERED RESPIRATORY (INHALATION) EVERY 4 HOURS PRN
COMMUNITY
End: 2020-03-05

## 2020-02-06 RX ORDER — ACETAMINOPHEN 325 MG/1
650 TABLET ORAL EVERY 6 HOURS PRN
Status: DISCONTINUED | OUTPATIENT
Start: 2020-02-06 | End: 2020-02-08 | Stop reason: HOSPADM

## 2020-02-06 RX ORDER — SODIUM CHLORIDE 9 MG/ML
250 INJECTION, SOLUTION INTRAVENOUS
Status: DISCONTINUED | OUTPATIENT
Start: 2020-02-06 | End: 2020-02-08 | Stop reason: HOSPADM

## 2020-02-06 RX ORDER — IPRATROPIUM BROMIDE AND ALBUTEROL SULFATE 2.5; .5 MG/3ML; MG/3ML
3 SOLUTION RESPIRATORY (INHALATION)
Status: COMPLETED | OUTPATIENT
Start: 2020-02-06 | End: 2020-02-06

## 2020-02-06 RX ORDER — TAMSULOSIN HYDROCHLORIDE 0.4 MG/1
0.8 CAPSULE ORAL DAILY
Status: DISCONTINUED | OUTPATIENT
Start: 2020-02-06 | End: 2020-02-06

## 2020-02-06 RX ORDER — FLUTICASONE PROPIONATE 44 UG/1
2 AEROSOL, METERED RESPIRATORY (INHALATION) DAILY
Status: DISCONTINUED | OUTPATIENT
Start: 2020-02-06 | End: 2020-02-08 | Stop reason: HOSPADM

## 2020-02-06 RX ORDER — DEXTROSE MONOHYDRATE 25 G/50ML
50 INJECTION, SOLUTION INTRAVENOUS ONCE
Status: DISCONTINUED | OUTPATIENT
Start: 2020-02-06 | End: 2020-02-06 | Stop reason: HOSPADM

## 2020-02-06 RX ORDER — LEVALBUTEROL INHALATION SOLUTION 1.25 MG/3ML
1.25 SOLUTION RESPIRATORY (INHALATION) EVERY 4 HOURS PRN
Status: DISCONTINUED | OUTPATIENT
Start: 2020-02-06 | End: 2020-02-08 | Stop reason: HOSPADM

## 2020-02-06 RX ORDER — SODIUM CHLORIDE 9 MG/ML
INJECTION, SOLUTION INTRAVENOUS CONTINUOUS
Status: ACTIVE | OUTPATIENT
Start: 2020-02-06 | End: 2020-02-07

## 2020-02-06 RX ORDER — TAMSULOSIN HYDROCHLORIDE 0.4 MG/1
0.8 CAPSULE ORAL EVERY EVENING
COMMUNITY
End: 2020-10-29 | Stop reason: SDUPTHER

## 2020-02-06 RX ORDER — ALBUMIN (HUMAN) 12.5 G/50ML
12.5 SOLUTION INTRAVENOUS
Status: DISCONTINUED | OUTPATIENT
Start: 2020-02-06 | End: 2020-02-08 | Stop reason: HOSPADM

## 2020-02-06 RX ORDER — ASPIRIN 81 MG/1
81 TABLET, CHEWABLE ORAL DAILY
Status: ON HOLD | COMMUNITY
End: 2020-02-26

## 2020-02-06 RX ORDER — ONDANSETRON 2 MG/ML
4 INJECTION INTRAMUSCULAR; INTRAVENOUS EVERY 4 HOURS PRN
Status: DISCONTINUED | OUTPATIENT
Start: 2020-02-06 | End: 2020-02-06

## 2020-02-06 RX ORDER — LIDOCAINE HYDROCHLORIDE 10 MG/ML
INJECTION, SOLUTION INFILTRATION; PERINEURAL
Status: COMPLETED
Start: 2020-02-06 | End: 2020-02-06

## 2020-02-06 RX ORDER — PANTOPRAZOLE SODIUM 40 MG/10ML
40 INJECTION, POWDER, LYOPHILIZED, FOR SOLUTION INTRAVENOUS 2 TIMES DAILY
Status: DISCONTINUED | OUTPATIENT
Start: 2020-02-06 | End: 2020-02-07

## 2020-02-06 RX ADMIN — PANTOPRAZOLE SODIUM 40 MG: 40 INJECTION, POWDER, LYOPHILIZED, FOR SOLUTION INTRAVENOUS at 19:12

## 2020-02-06 RX ADMIN — TRAZODONE HYDROCHLORIDE 100 MG: 50 TABLET ORAL at 22:08

## 2020-02-06 RX ADMIN — LORAZEPAM 0.5 MG: 2 INJECTION INTRAMUSCULAR; INTRAVENOUS at 08:42

## 2020-02-06 RX ADMIN — MONTELUKAST 10 MG: 10 TABLET, FILM COATED ORAL at 17:47

## 2020-02-06 RX ADMIN — TAMSULOSIN HYDROCHLORIDE 0.8 MG: 0.4 CAPSULE ORAL at 17:46

## 2020-02-06 RX ADMIN — ROPINIROLE HYDROCHLORIDE 1 MG: 0.5 TABLET, FILM COATED ORAL at 17:45

## 2020-02-06 RX ADMIN — LIDOCAINE HYDROCHLORIDE 1 ML: 10 INJECTION, SOLUTION INFILTRATION; PERINEURAL at 12:09

## 2020-02-06 RX ADMIN — PRAVASTATIN SODIUM 20 MG: 20 TABLET ORAL at 20:50

## 2020-02-06 RX ADMIN — IPRATROPIUM BROMIDE AND ALBUTEROL SULFATE 3 ML: .5; 3 SOLUTION RESPIRATORY (INHALATION) at 06:46

## 2020-02-06 RX ADMIN — SODIUM BICARBONATE 50 MEQ: 84 INJECTION, SOLUTION INTRAVENOUS at 07:24

## 2020-02-06 RX ADMIN — Medication 1 ML: at 12:09

## 2020-02-06 RX ADMIN — IOHEXOL 80 ML: 350 INJECTION, SOLUTION INTRAVENOUS at 16:04

## 2020-02-06 ASSESSMENT — ENCOUNTER SYMPTOMS
WEIGHT LOSS: 0
ORTHOPNEA: 0
HEMOPTYSIS: 0
HEARTBURN: 0
CLAUDICATION: 0
DIZZINESS: 1
WHEEZING: 0
SHORTNESS OF BREATH: 1
HEADACHES: 0
NAUSEA: 0
FEVER: 0
BLOOD IN STOOL: 0
TINGLING: 0
FOCAL WEAKNESS: 0
SPUTUM PRODUCTION: 0
VOMITING: 0
BLURRED VISION: 1
TREMORS: 0
BLURRED VISION: 0
DOUBLE VISION: 0
CHILLS: 0
PALPITATIONS: 0
BACK PAIN: 0
BRUISES/BLEEDS EASILY: 1
ABDOMINAL PAIN: 0
MYALGIAS: 0
ABDOMINAL PAIN: 1
DIARRHEA: 0
SINUS PAIN: 0
DEPRESSION: 0
EYE REDNESS: 0
COUGH: 0
NECK PAIN: 0

## 2020-02-06 ASSESSMENT — LIFESTYLE VARIABLES
SUBSTANCE_ABUSE: 0
DO YOU DRINK ALCOHOL: NO

## 2020-02-06 NOTE — DISCHARGE PLANNING
Outpatient Dialysis Note    Confirmed patient is active at:    [facility]  [address]   [place] [address]    Schedule: Monday, Wednesday, Friday  Time: 1:30 pm    Spoke with Jazmin at facility who confirmed.    Forwarded records for review.      Dawna Santa- Dialysis Coordinator  [facility] # [phone]

## 2020-02-06 NOTE — ED NOTES
Med Rec completed per patient's wife and med list (returned)   Allergies reviewed  No ORAL antibiotics in last 14 days    Patient takes Warfarin   7.5 mg on Monday and Friday   5 mg all other days

## 2020-02-06 NOTE — ED TRIAGE NOTES
Transfer from [facility] for emergent dialysis needed. States last successful dialysis was Monday; tried to have dialysis done yesterday but fistula infiltrated, right sided;  H&H 5.7 and 18, potassium 6+. Received 50 bicarb prior to transport

## 2020-02-06 NOTE — ED PROVIDER NOTES
ED Provider Note    CHIEF COMPLAINT  Chief Complaint   Patient presents with   • Shortness of Breath   • Electrolyte Imbalance       HPI  Parmjit Castelan is a 79 y.o. with ESRD on hemodialysis, COPD is transferred from [facility] for higher level of care. Patient presents with shortness of breath, malaise, fatigue, and mild abdominal pain. Patient had unsuccessful attempt of dialysis yesterday. Around 1 PM yesterday patient developed the symptoms. He also has progressive right arm swelling. He has associated blurry vision. His hemoglobin has decreased from baseline of 10 down to 5.7, and patient was found to be hyperkalemic 6.2 at [facility] ED. Patient received 50 mg of bicarbonate prior to arrival. Per chart review he had an intervention by Dr. Peters on 1/29/2019 on his fistula. Patient also had angiogram with stent placed. He is currently on Plavix, clopidogrel, and warfarin.    REVIEW OF SYSTEMS  Review of Systems   Constitutional: Positive for malaise/fatigue. Negative for chills and fever.   HENT: Negative for congestion and ear pain.    Eyes: Positive for blurred vision. Negative for redness.   Respiratory: Negative for cough and hemoptysis.    Cardiovascular: Negative for chest pain and palpitations.   Gastrointestinal: Positive for abdominal pain (mild). Negative for blood in stool, heartburn, nausea and vomiting.   Genitourinary: Negative for dysuria and hematuria.   Musculoskeletal: Negative for back pain and neck pain.   Skin: Negative for itching and rash.   Neurological: Negative for focal weakness and headaches.     PAST MEDICAL HISTORY  Past Medical History:   Diagnosis Date   • CHF (congestive heart failure), NYHA class II, chronic, systolic (HCC) E F30 in setting of atrial flutter 6/13/2019   • Atrial flutter ([facility]) 6/12/2019   • Bronchitis 12/25/2018   • Arterial leg ulcer ([facility]) 11/8/2018   • Primary insomnia 3/22/2018   • Chronic respiratory failure with hypoxia ([facility]) 5/8/2016   • Leg  "pain, bilateral 8/10/2015   • Peripheral vascular disease ([facility]) 8/10/2015   • Basal cell carcinoma of left cheek 3/26/2015   • CAD (coronary artery disease) 2014   • CKD (chronic kidney disease) stage 4, GFR 15-29 ml/min ([facility]) 1/15/2010    end stage renal disease   • BPH 2009   • Anesthesia     \"needs more sedation\" (can sometimes hear MD during procedure)    • Anticoagulation monitoring, special range    • Aortic stenosis     mod AS- concern for low flow severe AS with rEFof 30%   • CATARACT     sanjuanita surgery complete   • COPD (chronic obstructive pulmonary disease) ([facility])     wears 2.5 L o2 sometimes when he sleeps   • Detached retina    • Dialysis     m,w,f [facility]   • EMPHYSEMA    • Glaucoma     Early stage   • Gout    • Heart burn     occas   • Heart murmur     maria esther lee cardiologist   • Hypertension    • Indigestion     occas   • Kidney cyst    • On supplemental oxygen therapy    • Pneumonia    • Proteinuria    • PVD (peripheral vascular disease) ([facility])    • Sleep apnea     O2 PER CANULA  AT NIGHT 2l/m       FAMILY HISTORY  Noncontributory    SOCIAL HISTORY  Social History     Socioeconomic History   • Marital status:      Spouse name: Not on file   • Number of children: Not on file   • Years of education: Not on file   • Highest education level: Not on file   Occupational History   • Not on file   Social Needs   • Financial resource strain: Not on file   • Food insecurity:     Worry: Not on file     Inability: Not on file   • Transportation needs:     Medical: Not on file     Non-medical: Not on file   Tobacco Use   • Smoking status: Former Smoker     Packs/day: 1.00     Years: 40.00     Pack years: 40.00     Types: Cigarettes     Last attempt to quit: 2009     Years since quittin.1   • Smokeless tobacco: Never Used   • Tobacco comment: 1 pk a day for 35 yrs, QUIT 2010   Substance and Sexual Activity   • Alcohol use: No     Alcohol/week: 0.0 oz   • Drug use: No   • " Sexual activity: Never     Partners: Female     Comment: , two sons, 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   Lifestyle   • Physical activity:     Days per week: Not on file     Minutes per session: Not on file   • Stress: Not on file   Relationships   • Social connections:     Talks on phone: Not on file     Gets together: Not on file     Attends Nondenominational service: Not on file     Active member of club or organization: Not on file     Attends meetings of clubs or organizations: Not on file     Relationship status: Not on file   • Intimate partner violence:     Fear of current or ex partner: Not on file     Emotionally abused: Not on file     Physically abused: Not on file     Forced sexual activity: Not on file   Other Topics Concern   • Not on file   Social History Narrative   • Not on file       SURGICAL HISTORY  Past Surgical History:   Procedure Laterality Date   • STENT PLACEMENT  01/26/2020    lda   • AV FISTULA CREATION Right 8/6/2019    Procedure: CREATION, AV FISTULA -  RIGHT ARM  ,  and Ligation of Left Arm Fistula;  Surgeon: Sera Peters M.D.;  Location: [facility];  Service: General   • CATH PLACEMENT Right 8/6/2019    Procedure: INSERTION, CATHETER - PERMA ,;  Surgeon: Sera Peters M.D.;  Location: [facility];  Service: General   • JUSTIN  6/13/2019    Procedure: ECHOCARDIOGRAM, TRANSESOPHAGEAL;  Surgeon: Harvinder Hoang M.D.;  Location: [facility];  Service: Cardiac   • CARDIOVERSION  6/13/2019    Procedure: CARDIOVERSION;  Surgeon: Harvinder Hoang M.D.;  Location: [facility];  Service: Cardiac   • AV FISTULA CREATION Right 2/21/2019    Procedure: AV FISTULA CREATION - ARM;  Surgeon: David Cason M.D.;  Location: [facility];  Service: General   • FEMORAL ENDARTERECTOMY Left 1/25/2019    Procedure: FEMORAL ENDARTERECTOMY;  Surgeon: David Cason M.D.;  Location: [facility];  Service: General   •  FEMORAL POPLITEAL BYPASS Left 1/25/2019    Procedure: LEFT FEMORAL POPLITEAL POLYTETRAFLUOROETHYLENE ePTFE(PROPATEN VASCULAR GRAFT) BYPASS;  Surgeon: David Cason M.D.;  Location: SURGERY [facility];  Service: General   • ANGIOGRAM Left 1/25/2019    Procedure: LEFT LEG ANGIOGRAM;  Surgeon: David Cason M.D.;  Location: SURGERY [facility];  Service: General   • ENDOSCOPY PROCEDURE  3/21/2018    Procedure: ENDOSCOPY PROCEDURE/UPPER;  Surgeon: Enrique Child D.O.;  Location: SURGERY [facility];  Service: Gastroenterology   • COLONOSCOPY - ENDO  8/15/2016    Procedure: COLONOSCOPY - ENDO;  Surgeon: Mane Whatley M.D.;  Location: ENDOSCOPY [facility];  Service:    • GASTROSCOPY WITH BIOPSY  8/13/2016    Procedure: GASTROSCOPY WITH BIOPSY;  Surgeon: Jorge Leavitt M.D.;  Location: ENDOSCOPY [facility];  Service:    • RECOVERY  12/23/2015    Procedure: VASCULAR CASE-CASON-LEFT ARM FISTULOGRAM WITH ANGIOPLASTY;  Surgeon: Recoveryonly Surgery;  [place] [facility] [facility]  Service:    • RECOVERY  3/24/2015    Performed by [facility] at [place] [facility] [facility]   • RECOVERY  7/29/2014    Performed by Ir-Recovery Surgery at SURGERY SAME DAY [facility]   • RECOVERY  3/24/2014    Performed by Ir[facility]Recovery Surgery at SURGERY [facility]   • RECOVERY  12/17/2013    Performed by Ir[facility]Recovery Surgery at SURGERY [facility]   • RECOVERY  7/2/2013    Performed by Ir-Recovery Surgery at SURGERY SAME DAY [facility]   • AV FISTULA THROMBOLYSIS  7/2/2013    Performed by David Cason M.D. at SURGERY [facility]   • RECOVERY  1/29/2013    Performed by Ir-Recovery Surgery at SURGERY [facility] DAY [facility]   • RECOVERY  7/23/2012    Performed by SURGERY, IR-RECOVERY at SURGERY SAME DAY [facility]   • VITRECTOMY POSTERIOR  10/11/2011    Performed by NAHUM GE at SURGERY SAME DAY [facility]   • RECOVERY  8/12/2011    Performed by SURGERY,  IR-RECOVERY at SURGERY SAME DAY [facility]   • VITRECTOMY POSTERIOR  1/18/2011    Performed by NAHUM GE at SURGERY SAME DAY [facility]   • SCLERAL BUCKLING  1/18/2011    Performed by NAHUM GE at SURGERY SAME DAY [facility]   • AV FISTULOGRAM  9/17/2010    Performed by ALESHIA MOSCOSO at SURGERY [facility]S   • ANGIOPLASTY BALLOON  9/17/2010    Performed by ALESHIA MOSCOSO at SURGERY [facility]   • AV FISTULOGRAM  7/23/2010    Performed by ALESHIA MOSCOSO at SURGERY [facility]   • ANGIOPLASTY BALLOON  7/23/2010    Performed by ALESHIA MOSCOSO at SURGERY [facility]   • AV FISTULA REVISION  2/19/2010    Performed by WILLIE HATCH at SURGERY [facility]   • AV FISTULA CREATION  2/12/2010    Performed by ALESHIA MOSCOSO at SURGERY [facility]   • CATARACT PHACO WITH IOL  4/8/08    Performed by STACEY PHILLIPS at SURGERY SAME DAY [facility]   • OTHER ORTHOPEDIC SURGERY  1998    right toe for facititis       CURRENT MEDICATIONS  Home Medications     Reviewed by Mee Pittman (Pharmacy Tech) on 02/06/20 at 0851  Med List Status: Complete   Medication Last Dose Status   albuterol 108 (90 Base) MCG/ACT Aero Soln inhalation aerosol PRN Active   aspirin (ASA) 81 MG Chew Tab chewable tablet 2/5/2020 Active   B Complex-C-Folic Acid (DIALYVITE TABLET) Tab 2/5/2020 Active   Calcium Acetate, Phos Binder, 667 MG Cap 2/5/2020 Active   clopidogrel (PLAVIX) 75 MG Tab 2/5/2020 Active   fluticasone (FLONASE) 50 MCG/ACT nasal spray 2/5/2020 Active   Fluticasone-Umeclidin-Vilant (TRELEGY ELLIPTA) 100-62.5-25 MCG/INH AEROSOL POWDER, BREATH ACTIVATED 2/5/2020 Active   levalbuterol (XOPENEX) 1.25 MG/3ML Nebu Soln PRN Active   montelukast (SINGULAIR) 10 MG Tab 2/5/2020 Active   omeprazole (PRILOSEC) 40 MG delayed-release capsule 2/5/2020 Active   pravastatin (PRAVACHOL) 20 MG Tab 2/5/2020 Active   ROPINIRole (REQUIP) 0.5 MG Tab 2/5/2020 Active   tamsulosin (FLOMAX)  "0.4 MG capsule 2/5/2020 Active   torsemide (DEMADEX) 10 MG tablet 2/5/2020 Active   traZODone (DESYREL) 150 MG Tab 2/5/2020 Active   warfarin (COUMADIN) 5 MG Tab 2/5/2020 Active              ALLERGIES  No Known Allergies    PHYSICAL EXAM  VITAL SIGNS: Pulse (!) 105   Temp 37.1 °C (98.7 °F) (Temporal)   Resp 20   Ht 1.727 m (5' 8\")   Wt 72.6 kg (160 lb)   SpO2 100%   BMI 24.33 kg/m²       Constitutional: Ill appearing, constantly moving, mild distress  HENT: Normocephalic, Atraumatic, Bilateral external ears normal, Oropharynx moist, No oral exudates, Nose normal.   Eyes: PERRLA, EOMI, Conjunctiva normal, No discharge.   Neck: Normal range of motion, No tenderness, Supple, No stridor.   Lymphatic: No lymphadenopathy noted.   Cardiovascular: Normal heart rate, Normal rhythm, No murmurs, No rubs, No gallops.   Thorax & Lungs: Normal breath sounds, No respiratory distress, No wheezing, No chest tenderness.   Abdomen: Bowel sounds normal, Soft, No tenderness, No masses, No pulsatile masses.   Skin: Warm, Dry, No erythema, No rash.   Back: No tenderness, No CVA tenderness.   Genitalia: External genitalia appear normal, No masses or lesions. No discharge.   Extremities: Right with fistula, swollen with ecchymosis, Intact distal pulses, No cyanosis, No clubbing.   Musculoskeletal: Good range of motion in all major joints. No tenderness to palpation or major deformities noted.   Neurologic: Alert & oriented x 3, Normal motor function, Normal sensory function, No focal deficits noted.   Psychiatric: Affect normal, Judgment normal, Mood normal.     EKG  Sinus tachycardia with sporadic sinus pauses  RBBB      RADIOLOGY/PROCEDURES  US-HEMODIALYSIS GRAFT DUPLEX COMP UPPER EXTREMITY    (Results Pending)     Results for orders placed or performed during the hospital encounter of 02/06/20   COD - Adult (Type and Screen)   Result Value Ref Range    ABO Grouping Only O     Rh Grouping Only POS     Antibody Screen-Cod NEG     " Component R       R99                 Red Cells, LR       [id number]   issued       02/06/20   12:09      Product Type R99     Dispense Status issued     Unit Number (Barcoded) [id number]     Product Code (Barcoded) F0535T00     Blood Type (Barcoded) 5100    ARTERIAL BLOOD GAS w/ O2 (LAB)   Result Value Ref Range    Ph 7.46 7.40 - 7.50    Pco2 21.7 (L) 26.0 - 37.0 mmHg    Po2 118.3 (H) 64.0 - 87.0 mmHg    O2 Saturation 97.7 93.0 - 99.0 %    Hco3 15 (L) 17 - 25 mmol/L    Base Excess -8 (L) -4 - 3 mmol/L    Body Temp see below Centigrade   Comp Metabolic Panel   Result Value Ref Range    Sodium 137 135 - 145 mmol/L    Potassium 5.7 (H) 3.6 - 5.5 mmol/L    Chloride 95 (L) 96 - 112 mmol/L    Co2 19 (L) 20 - 33 mmol/L    Anion Gap 23.0 (H) 0.0 - 11.9    Glucose 114 (H) 65 - 99 mg/dL    Bun 150 (HH) 8 - 22 mg/dL    Creatinine 9.31 (HH) 0.50 - 1.40 mg/dL    Calcium 10.7 (H) 8.5 - 10.5 mg/dL    AST(SGOT) 13 12 - 45 U/L    ALT(SGPT) 15 2 - 50 U/L    Alkaline Phosphatase 80 30 - 99 U/L    Total Bilirubin 0.5 0.1 - 1.5 mg/dL    Albumin 3.8 3.2 - 4.9 g/dL    Total Protein 6.1 6.0 - 8.2 g/dL    Globulin 2.3 1.9 - 3.5 g/dL    A-G Ratio 1.7 g/dL   Hepatitis Panel Acute (4 components)   Result Value Ref Range    Hepatitis B Surface Antigen Negative Negative    Hepatitis B Cors Ab,IgM Negative Negative    Hepatitis A Virus Ab, IgM Negative Negative    Hepatitis C Antibody Negative Negative   Hep B Surface AB   Result Value Ref Range    Hep B Surface Antibody Quant 12.98 (H) 0.00 - 10.00 mIU/mL   ESTIMATED GFR   Result Value Ref Range    GFR If  7 (A) >60 mL/min/1.73 m 2    GFR If Non African American 6 (A) >60 mL/min/1.73 m 2   EKG   Result Value Ref Range    Report       [facility] Emergency Dept.    Test Date:  2020-02-06  Pt Name:    EDVIN COUGHLINCARLITO       Department: ER  MRN:        [id number]                      Room:       RD 04  Gender:     Male                          Technician: [id number]  :        [id number]                   Requested By:SHILA WEATHERS  Order #:    [id number]                    Reading MD:    Measurements  Intervals                                Axis  Rate:       102                          P:          0  GA:         178                          QRS:        -60  QRSD:       150                          T:          50  QT:         412  QTc:        537    Interpretive Statements  WANDERING PACEMAKER  RIGHT BUNDLE BRANCH BLOCK  Compared to ECG 2020 06:20:36  Wandering atrial pacemaker now present  Sinus rhythm no longer present  Left anterior fascicular block no longer present        COURSE & MEDICAL DECISION MAKING  Parmjit Castelan is a 79 y.o. with ESRD on hemodialysis presents with shortness of breath and increasing swelling of his RUE after unsuccessful dialysis attempt yesterday. Patient hyperkalemia is improving from 6.2 to 5.7 no significant EKG changes or chest pain. Anion gap decreasing from 26 to 23 ABG pending. Vascular surgery consulted and they recommend U/S to evaluate for fistula function. We are holding blood transfusion due to mild fluid overload.     2020 10:30 U/S of right arm fistula with 859ml/h. Nephrology consulted for dialysis, and they will dialsys attempt in ED. ABG reviewed normal pH. ABG consistent with triple base disorder metabolic anion gap acidosis consistent with esrd w/o dialysis, metabolic alkalosis c/w recent bicarbonate administration, respiratory alkalosis c/w anxiety and hyperventilation patient Po2 118.  2020 12:00 Patient started dialysis, feeling little better, vitals within normal range    Addendum:    I saw the patient in conjunction with the resident and his note reflects our collective work and I supervised the management of the patient.  I immediately consulted vascular surgery with Dr. Spaulding.  He recommended proceeding with dialysis if there appeared to be good flow on the right upper extremity duplex.   We were able to consult nephrology with Dr. Slater who tentatively agreed to attempt to proceed with dialysis.  The patient had a improved potassium after treatment from the outlying facility.  Patient tolerated dialysis and will require blood transfusion with dialysis.  Patient is critically ill requiring emergent dialysis in the emergency department as well as emergent blood transfusion.    CRITICAL CARE TIME:    The patient required approximately 40 minutes worth of critical care time. This excludes any procedures. This includes time spent directly at caring for the patient, making critical medical decisions, involving consultants and speaking with the family.    FINAL IMPRESSION  1.  End-stage renal disease  2.  Hyperkalemia with metabolic acidosis requiring emergent dialysis  3.  Right upper extremity AV fistula associated hematoma  4.  Critical anemia requiring emergent blood transfusion         Electronically signed by: Mane De Souza M.D., 2/6/2020 8:12 AM

## 2020-02-06 NOTE — ED PROVIDER NOTES
"ED Provider Note    CHIEF COMPLAINT  Chief Complaint   Patient presents with   • Shortness of Breath       HPI  Parmjit Castelan is a 79 y.o. male who presents to the emergency department with complaint of shortness of breath.  The patient is a dialysis patient with a significant history in the past of cardiac abnormality, COPD, dialysis.  He had intervention on his fistula on the right upper extremity by Dr. Peters on 29 January.  The patient subsequently went to dialysis yesterday and they are unable to access his fistula after multiple attempts.  Since that time is had significant swelling to his right upper extremity as well as ecchymosis.  In addition, he complains of increasing shortness of breath, and slight abdominal distention.  The patient also had an angiogram with a stent placed on the 26th of last month.  The patient called the on-call vascular surgeon was instructed to come to our facility for evaluation.  The patient does have a history of lung disease and states he has had increasing shortness of breath.    REVIEW OF SYSTEMS  Positives as above. Pertinent negatives include fever, lightness, dizziness, nausea, vomiting  All other review of systems are negative    PAST MEDICAL HISTORY  Past Medical History:   Diagnosis Date   • Anesthesia     \"needs more sedation\" (can sometimes hear MD during procedure)    • Anticoagulation monitoring, special range    • Aortic stenosis     mod AS- concern for low flow severe AS with rEFof 30%   • Arterial leg ulcer ([facility]) 11/8/2018   • Atrial flutter ([facility]) 6/12/2019   • Basal cell carcinoma of left cheek 3/26/2015   • BPH 7/14/2009   • Bronchitis 12/25/2018   • CAD (coronary artery disease) 2/20/2014   • CATARACT     sanjuanita surgery complete   • CHF (congestive heart failure), NYHA class II, chronic, systolic ([facility]) E F30 in setting of atrial flutter 6/13/2019   • Chronic respiratory failure with hypoxia ([facility]) 5/8/2016   • CKD (chronic kidney disease) stage 4, GFR 15-29 " ml/min ([facility]) 1/15/2010    end stage renal disease   • COPD (chronic obstructive pulmonary disease) ([facility])     wears 2.5 L o2 sometimes when he sleeps   • Detached retina    • Dialysis     m,w,f [place]   • EMPHYSEMA    • Glaucoma     Early stage   • Gout    • Heart burn     occas   • Heart murmur     maria esther lee cardiologist   • Hypertension    • Indigestion     occas   • Kidney cyst    • Leg pain, bilateral 8/10/2015   • On supplemental oxygen therapy    • Peripheral vascular disease ([facility]) 8/10/2015   • Pneumonia    • Primary insomnia 3/22/2018   • Proteinuria    • PVD (peripheral vascular disease) ([facility])    • Sleep apnea     O2 PER CANULA  AT NIGHT 2l/m       FAMILY HISTORY  Noncontributory    SOCIAL HISTORY  Social History     Socioeconomic History   • Marital status:      Spouse name: Not on file   • Number of children: Not on file   • Years of education: Not on file   • Highest education level: Not on file   Occupational History   • Not on file   Social Needs   • Financial resource strain: Not on file   • Food insecurity:     Worry: Not on file     Inability: Not on file   • Transportation needs:     Medical: Not on file     Non-medical: Not on file   Tobacco Use   • Smoking status: Former Smoker     Packs/day: 1.00     Years: 40.00     Pack years: 40.00     Types: Cigarettes     Last attempt to quit: 2009     Years since quittin.1   • Smokeless tobacco: Never Used   • Tobacco comment: 1 pk a day for 35 yrs, QUIT 2010   Substance and Sexual Activity   • Alcohol use: No     Alcohol/week: 0.0 oz   • Drug use: No   • Sexual activity: Never     Partners: Female     Comment: , two sons, 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   Lifestyle   • Physical activity:     Days per week: Not on file     Minutes per session: Not on file   • Stress: Not on file   Relationships   • Social connections:     Talks on phone: Not on file     Gets together: Not on file     Attends L[employer]-day service: Not  on file     Active member of club or organization: Not on file     Attends meetings of clubs or organizations: Not on file     Relationship status: Not on file   • Intimate partner violence:     Fear of current or ex partner: Not on file     Emotionally abused: Not on file     Physically abused: Not on file     Forced sexual activity: Not on file   Other Topics Concern   • Not on file   Social History Narrative   • Not on file       SURGICAL HISTORY  Past Surgical History:   Procedure Laterality Date   • STENT PLACEMENT  01/26/2020    lda   • AV FISTULA CREATION Right 8/6/2019    Procedure: CREATION, AV FISTULA -  RIGHT ARM  ,  and Ligation of Left Arm Fistula;  Surgeon: Sera Peters M.D.;  Location: [facility];  Service: General   • CATH PLACEMENT Right 8/6/2019    Procedure: INSERTION, CATHETER - PERMA ,;  Surgeon: Sera Peters M.D.;  Location: [facility];  Service: General   • JUSTIN  6/13/2019    Procedure: ECHOCARDIOGRAM, TRANSESOPHAGEAL;  Surgeon: Harvinder Hoang M.D.;  Location: [facility];  Service: Cardiac   • CARDIOVERSION  6/13/2019    Procedure: CARDIOVERSION;  Surgeon: Harvinder Hoang M.D.;  Location: [facility];  Service: Cardiac   • AV FISTULA CREATION Right 2/21/2019    Procedure: AV FISTULA CREATION - ARM;  Surgeon: David Cason M.D.;  Location: [facility];  Service: General   • FEMORAL ENDARTERECTOMY Left 1/25/2019    Procedure: FEMORAL ENDARTERECTOMY;  Surgeon: David Cason M.D.;  Location: [facility];  Service: General   • FEMORAL POPLITEAL BYPASS Left 1/25/2019    Procedure: LEFT FEMORAL POPLITEAL POLYTETRAFLUOROETHYLENE ePTFE(PROPATEN VASCULAR GRAFT) BYPASS;  Surgeon: David Cason M.D.;  Location: [facility];  Service: General   • ANGIOGRAM Left 1/25/2019    Procedure: LEFT LEG ANGIOGRAM;  Surgeon: David Cason M.D.;  Location: [facility];  Service:  General   • ENDOSCOPY PROCEDURE  3/21/2018    Procedure: ENDOSCOPY PROCEDURE/UPPER;  Surgeon: Enrique Child D.O.;  Location: SURGERY [facility];  Service: Gastroenterology   • COLONOSCOPY - ENDO  8/15/2016    Procedure: COLONOSCOPY - ENDO;  Surgeon: Mane Whatley M.D.;  Location: ENDOSCOPY [facility];  Service:    • GASTROSCOPY WITH BIOPSY  8/13/2016    Procedure: GASTROSCOPY WITH BIOPSY;  Surgeon: Jorge Leavitt M.D.;  Location: ENDOSCOPY [facility];  Service:    • RECOVERY  12/23/2015    Procedure: VASCULAR CASE-CASON-LEFT ARM FISTULOGRAM WITH ANGIOPLASTY;  Surgeon: Recoveryonly Surgery;  Location: SURGERY PRE-POST PROC UNIT [facility];  Service:    • RECOVERY  3/24/2015    Performed by Recoveryonly Surgery at SURGERY PRE-POST PROC UNIT [facility]   • RECOVERY  7/29/2014    Performed by Ir-Recovery Surgery at SURGERY SAME DAY [facility]   • RECOVERY  3/24/2014    Performed by Ir-Recovery Surgery at [facility]   • RECOVERY  12/17/2013    Performed by Ir-Recovery Surgery at SURGERY SAME DAY [facility]   • RECOVERY  7/2/2013    Performed by Ir-Recovery Surgery at SURGERY SAME DAY [facility]   • AV FISTULA THROMBOLYSIS  7/2/2013    Performed by David Cason M.D. at SURGERY [facility]   • RECOVERY  1/29/2013    Performed by Ir-Recovery Surgery at SURGERY SAME DAY [facility]   • RECOVERY  7/23/2012    Performed by SURGERY, IR-RECOVERY at SURGERY SAME DAY [facility]   • VITRECTOMY POSTERIOR  10/11/2011    Performed by NAHUM GE at SURGERY SAME DAY [facility]   • RECOVERY  8/12/2011    Performed by SURGERY, IR-RECOVERY at SURGERY SAME DAY [facility]   • VITRECTOMY POSTERIOR  1/18/2011    Performed by NAHUM GE at SURGERY SAME DAY [facility]   • SCLERAL BUCKLING  1/18/2011    Performed by NAHUM GE at SURGERY SAME DAY [facility]   • AV FISTULOGRAM  9/17/2010    Performed by DAVID CASON at [facility]   • ANGIOPLASTY BALLOON   9/17/2010    Performed by ALESHIA MOSCOSO at SURGERY [facility]   • AV FISTULOGRAM  7/23/2010    Performed by ALESHIA MOSCOSO at SURGERY [facility]   • ANGIOPLASTY BALLOON  7/23/2010    Performed by ALESHIA MOSCOSO at SURGERY [facility]   • AV FISTULA REVISION  2/19/2010    Performed by WILLIE HATCH at SURGERY [facility]   • AV FISTULA CREATION  2/12/2010    Performed by ALESHIA MOSCOSO at SURGERY [facility]   • CATARACT PHACO WITH IOL  4/8/08    Performed by STACEY PHILLIPS at SURGERY SAME DAY [facility]S   • OTHER ORTHOPEDIC SURGERY  1998    right toe for facititis       CURRENT MEDICATIONS  Home Medications     Reviewed by Bruce Salcido R.N. (Registered Nurse) on 02/06/20 at 0605  Med List Status: Complete   Medication Last Dose Status   aspirin (ASA) 81 MG Chew Tab chewable tablet 2/5/2020 Active   B Complex-C-Folic Acid (DIALYVITE TABLET) Tab 2/5/2020 Active   calcium acetate (PHOS-LO) 667 MG Cap 2/5/2020 Active   Calcium Acetate, Phos Binder, 667 MG Cap 2/5/2020 Active   clopidogrel (PLAVIX) 75 MG Tab 2/5/2020 Active   fluticasone (FLONASE) 50 MCG/ACT nasal spray 2/5/2020 Active   Fluticasone-Umeclidin-Vilant (TRELEGY ELLIPTA) 100-62.5-25 MCG/INH AEROSOL POWDER, BREATH ACTIVATED 2/5/2020 Active   levalbuterol (XOPENEX) 1.25 MG/3ML Nebu Soln  Active   montelukast (SINGULAIR) 10 MG Tab 2/5/2020 Active   Non Formulary Request 2/6/2020 Active   omeprazole (PRILOSEC) 40 MG delayed-release capsule 2/5/2020 Active   pravastatin (PRAVACHOL) 20 MG Tab 2/5/2020 Active   predniSONE (DELTASONE) 10 MG Tab  Active   ROPINIRole (REQUIP) 0.5 MG Tab 2/5/2020 Active   tamsulosin (FLOMAX) 0.4 MG capsule 2/5/2020 Active   torsemide (DEMADEX) 10 MG tablet 2/5/2020 Active   traZODone (DESYREL) 150 MG Tab  Active   warfarin (COUMADIN) 5 MG Tab 2/5/2020 Active                ALLERGIES  No Known Allergies    PHYSICAL EXAM  VITAL SIGNS: /47   Pulse 74   Temp 36.1 °C (96.9 °F)  "(Temporal)   Resp (!) 24   Ht 1.727 m (5' 8\")   Wt 73.6 kg (162 lb 4.1 oz)   SpO2 99%   BMI 24.67 kg/m²      Constitutional: Well developed, Well nourished, mild acute distress, Non-toxic appearance.   Eyes: PERRLA, EOMI, Conjunctiva normal, No discharge.   Cardiovascular: Normal heart rate, Normal rhythm, No murmurs, No rubs, No gallops, and intact distal pulses.   Thorax & Lungs: Decreased breath sounds throughout right lower greater than left, slight use of accessory muscles are inspiration expiration, no wheeze, rales or rhonchi.  Abdomen: Bowel sounds normal, Soft, No tenderness, No guarding, No rebound, No pulsatile masses.   Skin: Ecchymosis to the right upper extremity, positive fistula to the right upper extremity with a positive thrill, pallor throughout  Extremities: Right upper extremity has a fistula with positive thrill, is rounding edema, ecchymosis to the right upper extremity  Neurologic: Alert & oriented x 3, No focal deficits noted, acting appropriately on exam.  Psychiatric: Anxious on examination      RADIOLOGY/PROCEDURES  DX-CHEST-PORTABLE (1 VIEW)   Final Result      Stable right pleural effusion with compressive atelectasis. Correlate clinically for infection.        Results for orders placed or performed during the hospital encounter of 02/06/20   CBC WITH DIFFERENTIAL   Result Value Ref Range    WBC 5.5 4.8 - 10.8 K/uL    RBC 1.83 (L) 4.70 - 6.10 M/uL    Hemoglobin 5.7 (LL) 14.0 - 18.0 g/dL    Hematocrit 18.0 (L) 42.0 - 52.0 %    MCV 97.8 81.4 - 97.8 fL    MCH 30.6 27.0 - 33.0 pg    MCHC 31.3 (L) 33.7 - 35.3 g/dL    RDW 63.3 (H) 35.9 - 50.0 fL    Platelet Count 178 164 - 446 K/uL    MPV 10.4 9.0 - 12.9 fL    Neutrophils-Polys 86.10 (H) 44.00 - 72.00 %    Lymphocytes 6.90 (L) 22.00 - 41.00 %    Monocytes 5.70 0.00 - 13.40 %    Eosinophils 0.70 0.00 - 6.90 %    Basophils 0.20 0.00 - 1.80 %    Immature Granulocytes 0.40 0.00 - 0.90 %    Nucleated RBC 0.40 /100 WBC    Neutrophils " (Absolute) 4.72 1.82 - 7.42 K/uL    Lymphs (Absolute) 0.38 (L) 1.00 - 4.80 K/uL    Monos (Absolute) 0.31 0.00 - 0.85 K/uL    Eos (Absolute) 0.04 0.00 - 0.51 K/uL    Baso (Absolute) 0.01 0.00 - 0.12 K/uL    Immature Granulocytes (abs) 0.02 0.00 - 0.11 K/uL    NRBC (Absolute) 0.02 K/uL   COMP METABOLIC PANEL   Result Value Ref Range    Sodium 136 135 - 145 mmol/L    Potassium 6.2 (H) 3.6 - 5.5 mmol/L    Chloride 93 (L) 96 - 112 mmol/L    Co2 17 (L) 20 - 33 mmol/L    Anion Gap 26.0 (H) 0.0 - 11.9    Glucose 97 65 - 99 mg/dL    Bun 132 (HH) 8 - 22 mg/dL    Creatinine 9.45 (HH) 0.50 - 1.40 mg/dL    Calcium 10.9 (H) 8.4 - 10.2 mg/dL    AST(SGOT) 17 12 - 45 U/L    ALT(SGPT) 15 2 - 50 U/L    Alkaline Phosphatase 93 30 - 99 U/L    Total Bilirubin 0.3 0.1 - 1.5 mg/dL    Albumin 3.5 3.2 - 4.9 g/dL    Total Protein 6.0 6.0 - 8.2 g/dL    Globulin 2.5 1.9 - 3.5 g/dL    A-G Ratio 1.4 g/dL   PROTHROMBIN TIME (INR)   Result Value Ref Range    PT 20.1 (H) 12.0 - 14.6 sec    INR 1.68 (H) 0.87 - 1.13   APTT   Result Value Ref Range    APTT 30.2 24.7 - 36.0 sec   MAGNESIUM   Result Value Ref Range    Magnesium 1.7 1.5 - 2.5 mg/dL   PHOSPHORUS   Result Value Ref Range    Phosphorus 3.4 2.5 - 4.5 mg/dL   COD - Adult (Type and Screen)   Result Value Ref Range    ABO Grouping Only O     Rh Grouping Only POS     Antibody Screen-Cod NEG    ESTIMATED GFR   Result Value Ref Range    GFR If  7 (A) >60 mL/min/1.73 m 2    GFR If Non African American 5 (A) >60 mL/min/1.73 m 2   EKG   Result Value Ref Range    Report       [facility] Emergency Dept.    Test Date:  2020  Pt Name:    EDVIN GREEN       Department: E[facility]  MRN:        [id number]                      [facility]  Gender:     Male                         Technician:   :        [id number]                   Requested By:HARLAN ROCK  Order #:    [id number]                    Reading MD: HARLAN ROCK,  DO    Measurements  Intervals                                Axis  Rate:       90                           P:          17  SD:         199                          QRS:        -41  QRSD:       155                          T:          77  QT:         419  QTc:        513    Interpretive Statements  Sinus rhythm  RBBB and LAFB  Compared to ECG 11/14/2019 13:24:47  Left anterior fascicular block now present  Electronically Signed On 2-6-2020 7:07:22 PST by HARLAN ROCK DO             COURSE & MEDICAL DECISION MAKING  Pertinent Labs & Imaging studies reviewed. (See chart for details)  This is a 79-year-old gentleman presents with shortness of breath, anemia.  Do believe the patient probably had a fistula abnormality causing him to have the bleeding in the right upper extremity.  In addition, the patient does have evidence of anemia with a hemoglobin of 5.7 which is a significant drop from the hemoglobin he had on 1/24/2020 of 10.3.  Patient is probably bleeding into the right bicep region.  In addition, the patient had a slight elevation of his potassium of 6.2 yet no significant EKG changes.  The patient received amp of bicarbonate prior to transfer.  At this point, I discussed the patient with Dr. Perez nephrology who recommends the patient to be transferred to [facility] for an emergent temporary dialysis catheter placement interventional radiology and vascular surgery consultation.  Here in the emergency department, the patient received albuterol Atrovent nebulizer solution treatment secondary to his shortness of breath seem to be helping him significantly.  I have ordered a COD but at this point time the patient will be transferred immediately to [facility] for further evaluation and blood product administration as well.  He has a slightly elevated INR of 1.6 and this point I will not give the patient for factor PCC and will defer to [facility]  for further evaluation, management.  The patient here is seemingly stable, has a normal blood pressure, normal heart rate, has a slight increase in respiratory effort probably secondary to fluid overload.  I do believe the patient warrants urgent transfer to [facility] for dialysis as well as probable blood transfusions and management of his fistula.  Dr. Desir did call back and stated this is an appropriate decision and they will be consulting on the patient [facility] will have more access for vascular surgery.  Prior to transfer, the patient has normal vital signs, he has no impending respiratory distress respiratory failure, ischemically stable require transfer for further evaluation.  Discussed the patient with Dr. Peters who accepts the patient [facility] and I was attempting to contact the transfer center was on hold for multiple minutes.    CRITICAL CARE  The very real possibilty of a deterioration of this patient's condition required the highest level of my preparedness for sudden, emergent intervention.  I provided critical care services, which included medication orders, frequent reevaluations of the patient's condition and response to treatment, ordering and reviewing test results, and discussing the case with various consultants.  The critical care time associated with the care of the patient was 35 minutes. Review chart for interventions. This time is exclusive of any other billable procedures.       New Prescriptions    No medications on file       FINAL IMPRESSION  Anemia secondary to fistula exacerbation  Fluid overload  Hyperkalemia  Pleural effusion  Critical care time 35 minutes         Electronically signed by: Guillaume Young D.O., 2/6/2020 6:51 AM

## 2020-02-06 NOTE — ED NOTES
Dialysis RN in room preparing to perform dialysis, will release blood products as soon as informed it is time.

## 2020-02-06 NOTE — FLOWSHEET NOTE
02/06/20 0646   Events/Summary/Plan   Events/Summary/Plan TX given in ER   Non-Invasive Resp Device Site Inspection Completed Intact   Interdisciplinary Plan of Care-Goals (Indications)   Bronchodilator Indications History / Diagnosis   Interdisciplinary Plan of Care-Outcomes    Bronchodilator Outcome Diminished Wheezing and Volume of Air Movement Increased   Education   Education Yes - Pt. / Family has been Instructed in use of Respiratory Medications and Adverse Reactions;Yes - Pt. / Family has been Instructed in use of Respiratory Equipment   RT Assessment of Delivered Medications   Evaluation of Medication Delivery Daily Yes-- Pt /Family has been Instructed in use of Respiratory Medications and Adverse Reactions   SVN Group   #SVN Performed Yes   Given By: Mouthpiece   Date SVN Last Changed 02/06/20   Date SVN Next Change Due (Q 7 Days) 02/13/20   Chest Exam   Respiration (!) 24   Pulse 74   Breath Sounds   Pre/Post Intervention Pre Intervention Assessment   RUL Breath Sounds Diminished   RML Breath Sounds Diminished   RLL Breath Sounds Diminished   SILVANA Breath Sounds Diminished   LLL Breath Sounds Diminished   Oximetry   #Pulse Oximetry (Single Determination) Yes   Oxygen   Pulse Oximetry 99 %   O2 (LPM) 2   O2 Daily Delivery Respiratory  Silicone Nasal Cannula

## 2020-02-06 NOTE — ED NOTES
Called 6th floor to inform RN who is assuming care that pt is ready for bedside report, transport to CT then floor.

## 2020-02-06 NOTE — PROGRESS NOTES
Patient not ready for transfer. Patient is receiving HD. ED RN will call when patient is ready for transfer.

## 2020-02-06 NOTE — ED NOTES
ROSALIO pulled away from building. Pt tachypnic, VS otherwise stable. Report called to charge Melody Strauss at [facility] ED.

## 2020-02-06 NOTE — CONSULTS
Nephrology Consultation    Date of Service  2/6/2020    Referring Physician  Dr. Mane De Souza    Consulting Physician  Jerry Slater M.D.    Reason for Consultation  Management of ESRD on HD      History of Presenting Illness  79 y.o. male with ESRD on HD MWF who presented 2/6/2020 with fatigue and RUE swelling.    The patient went to his usual dialysis session yesterday evening.  His wife complains that the techs were having a hard time cannulating his fistula, and worries that they were digging around in his arm.  The patient was unable to start dialysis due to pain and inability to achieve good blood flow with needles on dialysis, so he was sent home.  Overnight, the patient complained of worsening swelling and pain in his right arm.  This morning, he complained of worsening fatigue and shortness of breath, so he came into the emergency room.  In the emergency room he was found to have swollen right arm, and acute anemia.  The patient was transferred from [facility] to [facility] due to concern of possibly needing vascular surgery or interventional radiology intervention.  However, on assessment of dialysis nurse, she felt she was able to cannulate his fistula, and in fact, was able to successfully cannulate his fistula with 2 needles.  I examined the patient while on dialysis.  He complains of some chest pain and shortness of breath, but says it is getting better now that he is getting volume removal and blood transfusion with dialysis.  He denies headache, nausea, vomiting.    The patient says he has been on dialysis for 7 years, reason for ESRD is hypertension.  The patient is anuric.  The patient recently had a fistulogram with Dr. Peters, and she said the fistulogram was perfect, and there is no clear reason why there is trouble cannulating the fistula on an outpatient basis.      Review of Systems  Review of Systems   Constitutional: Negative for fever.   Respiratory: Positive for shortness of breath.  "   Cardiovascular: Negative for chest pain.   Gastrointestinal: Negative for abdominal pain.   All other systems reviewed and are negative.      Past Medical History  Past Medical History:   Diagnosis Date   • Anesthesia     \"needs more sedation\" (can sometimes hear MD during procedure)    • Anticoagulation monitoring, special range    • Aortic stenosis     mod AS- concern for low flow severe AS with rEFof 30%   • Arterial leg ulcer ([facility]) 11/8/2018   • Atrial flutter ([facility]) 6/12/2019   • Basal cell carcinoma of left cheek 3/26/2015   • BPH 7/14/2009   • Bronchitis 12/25/2018   • CAD (coronary artery disease) 2/20/2014   • CATARACT     sanjuanita surgery complete   • CHF (congestive heart failure), NYHA class II, chronic, systolic ([facility]) E F30 in setting of atrial flutter 6/13/2019   • Chronic respiratory failure with hypoxia ([facility]) 5/8/2016   • CKD (chronic kidney disease) stage 4, GFR 15-29 ml/min ([facility]) 1/15/2010    end stage renal disease   • COPD (chronic obstructive pulmonary disease) ([facility])     wears 2.5 L o2 sometimes when he sleeps   • Detached retina    • Dialysis     m,w,f [facility]   • EMPHYSEMA    • Glaucoma     Early stage   • Gout    • Heart burn     occas   • Heart murmur     maria esther lee cardiologist   • Hypertension    • Indigestion     occas   • Kidney cyst    • Leg pain, bilateral 8/10/2015   • On supplemental oxygen therapy    • Peripheral vascular disease ([facility]) 8/10/2015   • Pneumonia    • Primary insomnia 3/22/2018   • Proteinuria    • PVD (peripheral vascular disease) ([facility])    • Sleep apnea     O2 PER CANULA  AT NIGHT 2l/m       Surgical History  Past Surgical History:   Procedure Laterality Date   • STENT PLACEMENT  01/26/2020    lda   • AV FISTULA CREATION Right 8/6/2019    Procedure: CREATION, AV FISTULA -  RIGHT ARM  ,  and Ligation of Left Arm Fistula;  Surgeon: Sera Peters M.D.;  Location: SURGERY [facility];  Service: General   • CATH PLACEMENT Right 8/6/2019    Procedure: " INSERTION, CATHETER - PERMA ,;  Surgeon: Sera Peters M.D.;  Location: SURGERY [facility];  Service: General   • JUSTIN  6/13/2019    Procedure: ECHOCARDIOGRAM, TRANSESOPHAGEAL;  Surgeon: Harvinder Hoang M.D.;  Location: SURGERY [facility];  Service: Cardiac   • CARDIOVERSION  6/13/2019    Procedure: CARDIOVERSION;  Surgeon: Harvinder Hoang M.D.;  Location: [facility];  Service: Cardiac   • AV FISTULA CREATION Right 2/21/2019    Procedure: AV FISTULA CREATION - ARM;  Surgeon: David Cason M.D.;  Location: SURGERY [facility];  Service: General   • FEMORAL ENDARTERECTOMY Left 1/25/2019    Procedure: FEMORAL ENDARTERECTOMY;  Surgeon: David Cason M.D.;  Location: SURGERY [facility];  Service: General   • FEMORAL POPLITEAL BYPASS Left 1/25/2019    Procedure: LEFT FEMORAL POPLITEAL POLYTETRAFLUOROETHYLENE ePTFE(PROPATEN VASCULAR GRAFT) BYPASS;  Surgeon: David Cason M.D.;  Location: SURGERY [facility];  Service: General   • ANGIOGRAM Left 1/25/2019    Procedure: LEFT LEG ANGIOGRAM;  Surgeon: David Cason M.D.;  Location: SURGERY [facility];  Service: General   • ENDOSCOPY PROCEDURE  3/21/2018    Procedure: ENDOSCOPY PROCEDURE/UPPER;  Surgeon: Enrique Child D.O.;  Location: [facility];  Service: Gastroenterology   • COLONOSCOPY - ENDO  8/15/2016    Procedure: COLONOSCOPY - ENDO;  Surgeon: Mane Whatley M.D.;  Location: ENDOSCOPY [facility];  Service:    • GASTROSCOPY WITH BIOPSY  8/13/2016    Procedure: GASTROSCOPY WITH BIOPSY;  Surgeon: Jorge Leavitt M.D.;  Location: ENDOSCOPY [facility];  Service:    • RECOVERY  12/23/2015    Procedure: VASCULAR CASE-CASON-LEFT ARM FISTULOGRAM WITH ANGIOPLASTY;  Surgeon: [facility];  Location: SURGERY [facility];  Service:    • RECOVERY  3/24/2015    Performed by [facility] at SURGERY [facility]   • RECOVERY  7/29/2014    Performed  by [facility] at SURGERY SAME DAY [facility] ORS   • RECOVERY  3/24/2014    Performed by [facility] at SURGERY [facility]   • RECOVERY  12/17/2013    Performed by [facility] at SURGERY SAME DAY [facility] ORS   • RECOVERY  7/2/2013    Performed by [facility] at SURGERY SAME DAY [facility] ORS   • AV FISTULA THROMBOLYSIS  7/2/2013    Performed by David Cason M.D. at SURGERY [facility] ORS   • RECOVERY  1/29/2013    Performed by [facility] at SURGERY SAME DAY [facility] ORS   • RECOVERY  7/23/2012    Performed by SURGERY, IR[facility]RECOVERY at SURGERY SAME DAY [facility] ORS   • VITRECTOMY POSTERIOR  10/11/2011    Performed by NAHUM GE at SURGERY SAME DAY [facility] ORS   • RECOVERY  8/12/2011    Performed by SURGERY, IR-RECOVERY at SURGERY SAME DAY [facility] ORS   • VITRECTOMY POSTERIOR  1/18/2011    Performed by [facility] at SURGERY SAME DAY [facility] ORS   • SCLERAL BUCKLING  1/18/2011    Performed by [facility] at SURGERY SAME DAY [facility] ORS   • AV FISTULOGRAM  9/17/2010    Performed by DAVID CASON at SURGERY [facility] ORS   • ANGIOPLASTY BALLOON  9/17/2010    Performed by DAVID CASON at SURGERY [facility] ORS   • AV FISTULOGRAM  7/23/2010    Performed by DAVID CASON at SURGERY [facility] ORS   • ANGIOPLASTY BALLOON  7/23/2010    Performed by DAVID CASON at SURGERY [facility] ORS   • AV FISTULA REVISION  2/19/2010    Performed by WILLIE HATCH at SURGERY [facility] ORS   • AV FISTULA CREATION  2/12/2010    Performed by DAVID CASON at SURGERY [facility] ORS   • CATARACT PHACO WITH IOL  4/8/08    Performed by STACEY PHILLIPS at SURGERY SAME DAY [facility] ORS   • OTHER ORTHOPEDIC SURGERY  1998    right toe for facititis       Family History  Family History   Problem Relation Age of Onset   • Stroke Mother    • Hypertension Mother    • Lung Disease Father         Emphysema, resp failure   • Genitourinary ()  Problems Maternal Aunt         hematuria   • Hypertension Brother        Social History  Social History     Socioeconomic History   • Marital status:      Spouse name: Not on file   • Number of children: Not on file   • Years of education: Not on file   • Highest education level: Not on file   Occupational History   • Not on file   Social Needs   • Financial resource strain: Not on file   • Food insecurity:     Worry: Not on file     Inability: Not on file   • Transportation needs:     Medical: Not on file     Non-medical: Not on file   Tobacco Use   • Smoking status: Former Smoker     Packs/day: 1.00     Years: 40.00     Pack years: 40.00     Types: Cigarettes     Last attempt to quit: 2009     Years since quittin.1   • Smokeless tobacco: Never Used   • Tobacco comment: 1 pk a day for 35 yrs, QUIT 2010   Substance and Sexual Activity   • Alcohol use: No     Alcohol/week: 0.0 oz   • Drug use: No   • Sexual activity: Never     Partners: Female     Comment: , two sons, 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   Lifestyle   • Physical activity:     Days per week: Not on file     Minutes per session: Not on file   • Stress: Not on file   Relationships   • Social connections:     Talks on phone: Not on file     Gets together: Not on file     Attends [employer] service: Not on file     Active member of club or organization: Not on file     Attends meetings of clubs or organizations: Not on file     Relationship status: Not on file   • Intimate partner violence:     Fear of current or ex partner: Not on file     Emotionally abused: Not on file     Physically abused: Not on file     Forced sexual activity: Not on file   Other Topics Concern   • Not on file   Social History Narrative   • Not on file       Medications  Current Facility-Administered Medications   Medication Dose Route Frequency Provider Last Rate Last Dose   • NS (BOLUS) infusion 250 mL  250 mL Intravenous DIALYSIS PRN Jerry Slater M.D.       •  albumin human 25% solution 12.5 g  12.5 g Intravenous DIALYSIS PRN Jerry Slater M.D.       • lidocaine (XYLOCAINE) 1 % injection 1 mL  1 mL Intradermal PRN Jrery Slater M.D.   1 mL at 02/06/20 1209   • albuterol inhaler 1-2 Puff  1-2 Puff Inhalation Q4HRS PRN Kike Darling M.D.       • fluticasone (FLONASE) nasal spray 100 mcg  2 Spray Nasal DAILY Kike Darling M.D.       • levalbuterol (XOPENEX) 1.25 MG/3ML nebulizer solution 1.25 mg  1.25 mg Nebulization Q4HRS PRN Kike Darling M.D.       • montelukast (SINGULAIR) tablet 10 mg  10 mg Oral Q EVENING Kike Darling M.D.       • pravastatin (PRAVACHOL) tablet 20 mg  20 mg Oral Nightly Kike Darling M.D.       • ROPINIRole (REQUIP) tablet 1 mg  1 mg Oral DAILY Kike Darling M.D.       • tamsulosin (FLOMAX) capsule 0.8 mg  0.8 mg Oral DAILY Kike Darling M.D.       • Respiratory Care per Protocol   Nebulization Continuous RT Kike Darling M.D.       • acetaminophen (TYLENOL) tablet 650 mg  650 mg Oral Q6HRS PRN Kike Darling M.D.       • pantoprazole (PROTONIX) injection 40 mg  40 mg Intravenous BID Kike Darling M.D.       • NS infusion   Intravenous Continuous Evan Kennedy M.D.       • umeclidinium-vilanterol (ANORO ELLIPTA) inhaler 1 Puff  1 Puff Inhalation QDAILY (RT) Kike Darling M.D.       • fluticasone (FLOVENT HFA) 44 MCG/ACT inhaler 88 mcg  2 Puff Inhalation DAILY Kike Darling M.D.         Current Outpatient Medications   Medication Sig Dispense Refill   • tamsulosin (FLOMAX) 0.4 MG capsule Take 0.8 mg by mouth every day.     • warfarin (COUMADIN) 5 MG Tab Take 5-7.5 mg by mouth See Admin Instructions. 7.5 mg on Monday and Friday   5 mg all other days     • albuterol 108 (90 Base) MCG/ACT Aero Soln inhalation aerosol Inhale 1-2 Puffs by mouth every four hours as needed for Shortness of Breath.     • aspirin (ASA) 81 MG Chew Tab chewable tablet Take 81 mg by  mouth every day.     • clopidogrel (PLAVIX) 75 MG Tab Take 75 mg by mouth every day.     • ROPINIRole (REQUIP) 0.5 MG Tab TAKE TWO TABLETS BY MOUTH DAILY 180 Tab 2   • omeprazole (PRILOSEC) 40 MG delayed-release capsule TAKE ONE CAPSULE BY MOUTH DAILY 30 Cap 2   • Fluticasone-Umeclidin-Vilant (TRELEGY ELLIPTA) 100-62.5-25 MCG/INH AEROSOL POWDER, BREATH ACTIVATED Inhale 1 Inhaler by mouth every day. 1 Each 6   • montelukast (SINGULAIR) 10 MG Tab Take 1 Tab by mouth every evening. 90 Tab 3   • fluticasone (FLONASE) 50 MCG/ACT nasal spray Spray 1-2 Sprays in nose every day. Each nostril. 1 Bottle 6   • traZODone (DESYREL) 150 MG Tab Take 150-300 mg by mouth every bedtime.     • B Complex-C-Folic Acid (DIALYVITE TABLET) Tab TAKE ONE TABLET BY MOUTH DAILY 90 Tab 3   • Calcium Acetate, Phos Binder, 667 MG Cap TAKE 3 CAPSULES BY MOUTH WITH MEALS (3 MEALS) AND 2 CAPSULES WITH SNACKS (2 SNACKS) 420 Cap 11   • torsemide (DEMADEX) 10 MG tablet TAKE THREE TABLETS BY MOUTH DAILY 270 Tab 3   • levalbuterol (XOPENEX) 1.25 MG/3ML Nebu Soln 3 mL by Nebulization route every four hours as needed for Shortness of Breath. 120 Bullet 11   • pravastatin (PRAVACHOL) 20 MG Tab Take 20 mg by mouth every evening.         Allergies  No Known Allergies    Physical Exam  Temp:  [36 °C (96.8 °F)-37.1 °C (98.7 °F)] 36.2 °C (97.2 °F)  Pulse:  [] 100  Resp:  [16-30] 27  BP: ()/(42-63) 114/53  SpO2:  [99 %-100 %] 100 %    Physical Exam   Constitutional: He appears well-developed. He appears listless. No distress. Nasal cannula in place.   HENT:   Mouth/Throat: Oropharynx is clear and moist. No oropharyngeal exudate.   Eyes: EOM are normal. No scleral icterus.   Neck: No tracheal deviation present.   Cardiovascular: Normal rate.   Murmur heard.  Pulmonary/Chest: Effort normal and breath sounds normal. No stridor. He has no rales.   Abdominal: Soft. He exhibits no distension. There is no tenderness.   Musculoskeletal: Normal range of  motion.         General: No edema.   Neurological: He appears listless.   Moves all extremities well   Skin: Skin is warm and dry. He is not diaphoretic.   Psychiatric: He has a normal mood and affect.   Access: Right upper arm AV fistula, currently accessed with needles on my evaluation.      Fluids  Date 02/06/20 0700 - 02/07/20 0659   Shift 5998-0579 0857-7270 4981-3093 24 Hour Total   INTAKE   Blood 900   900   Shift Total 900   900   OUTPUT   Shift Total       Weight (kg) 72.6 72.6 72.6 72.6       Laboratory  Labs reviewed, pertinent labs below.  Recent Labs     02/06/20  0610 02/06/20  0821   WBC 5.5 8.6   RBC 1.83* 1.97*   HEMOGLOBIN 5.7* 6.1*   HEMATOCRIT 18.0* 20.0*   MCV 97.8 101.5*   MCH 30.6 31.0   MCHC 31.3* 30.5*   RDW 63.3* 65.9*   PLATELETCT 178 234   MPV 10.4 10.6     Recent Labs     02/06/20  0610 02/06/20  0821   SODIUM 136 137   POTASSIUM 6.2* 5.7*   CHLORIDE 93* 95*   CO2 17* 19*   GLUCOSE 97 114*   * 150*   CREATININE 9.45* 9.31*   CALCIUM 10.9* 10.7*     Recent Labs     02/06/20  0610   APTT 30.2   INR 1.68*            URINALYSIS:  Lab Results   Component Value Date/Time    COLORURINE Yellow 03/19/2018 2009    CLARITY Sl Cloudy (A) 03/19/2018 2009    SPECGRAVITY 1.010 03/19/2018 2009    PHURINE 8.5 (A) 03/19/2018 2009    KETONES Negative 03/19/2018 2009    PROTEINURIN >=300 (A) 03/19/2018 2009    BILIRUBINUR Negative 03/19/2018 2009    NITRITE Negative 03/19/2018 2009    LEUKESTERAS Negative 03/19/2018 2009    OCCULTBLOOD Small (A) 03/19/2018 2009     [facility]  Lab Results   Component Value Date/Time    TOTPROTUR 774.0 (H) 01/09/2012 1644      Lab Results   Component Value Date/Time    CREATININEU 141.57 01/09/2012 1644       Imaging reviewed  US-HEMODIALYSIS GRAFT DUPLEX COMP UPPER EXTREMITY   Final Result      CT-ABDOMEN-PELVIS WITH    (Results Pending)         Assessment/Plan  79 y.o. male with ESRD on HD MWF who presented 2/6/2020 with fatigue and RUE swelling.    1.  ESRD on  hemodialysis Monday Wednesday Friday.  Plan for dialysis today due to missed dialysis yesterday.  Plan for 2 to 3 L of ultrafiltration as tolerated.  Patient is anuric.  Check labs daily.    2.  Access: Right upper arm AV fistula.  Avoid blood pressure checks, lab draws, and IVs in the right arm.    3.  Right upper extremity swelling, likely due to AV fistula infiltration.  Monitor closely for signs of compartment syndrome.    4.  Acute blood loss anemia, likely due to hematoma in the right upper arm.  Transfuse blood products as needed.  Check CBC daily.    5.  Anemia of chronic disease.  Continue Epogen 3 times weekly.  Check CBC daily.    6.  Hyperkalemia, this should improve with dialysis.  Check labs daily.    7.  Metabolic acidosis, due to missed dialysis.  This should improve with dialysis.  No need for alkali supplementation.  Check labs daily.    8.  Hypercalcemia, mild.  Should stabilize with dialysis.  Check labs daily.    Prognosis guarded      Jerry Slater MD  Nephrology

## 2020-02-06 NOTE — CONSULTS
Critical Care Consultation    Date of consult: 2/6/2020    Referring Physician  Mane De Souza M.D.    Reason for Consultation  Acute anemia    History of Presenting Illness  79 y.o. male who presented 2/6/2020 with Aortic stenosis that is being evaluated for a TAVR, afib, CAD s/p LAD stent on Tuesday, CHF, ckd on dialysis, COPD, hypertension that was transferred from [facility] for higher level of care. That recent had a fistulagram Patient describes he was placed on aspirin and plavix for a stent to his LAD on Tuesday of this week. and also takes coumadin for afib and recent TAVR that describes having multiple attempts without success that then developed swelling and bruising in his arm. He also noticed bruising at multiple other place at groin puncture site and notice dark black stools that thought it was related to having blue berries. He has had a prior GI bleed but doesn't remember. Patient describes feeling week and shortness of breath and chest tightness. Patient was afebrile and hr was 's SBP was 128 that dropped to 95/63   He was found to have a hg of 5.7 plt of 178, inr of 1.68 teg with 99% and 74% inhibition. I was asked to consult patient was on emergent dialysis and received 1 prbc. K was 6.2 co2 was 17 with repeat k of 5.7 after treatment. CXR with pulmonary edema and right side effusion. On multiple re-assessments patient color return hypotension improved while on dialysis and 3 units of PRBC and chest pain/tightiness resolved.     Code Status  Full Code    Review of Systems  Review of Systems   Unable to perform ROS: Acuity of condition       Past Medical History   has a past medical history of Anesthesia, Anticoagulation monitoring, special range, Aortic stenosis, Arterial leg ulcer (HCC) (11/8/2018), Atrial flutter (HCC) (6/12/2019), Basal cell carcinoma of left cheek (3/26/2015), BPH (7/14/2009), Bronchitis (12/25/2018), CAD (coronary artery disease) (2/20/2014), CATARACT, CHF  (congestive heart failure), NYHA class II, chronic, systolic ([facility]) E F30 in setting of atrial flutter (6/13/2019), Chronic respiratory failure with hypoxia ([facility]) (5/8/2016), CKD (chronic kidney disease) stage 4, GFR 15-29 ml/min ([facility]) (1/15/2010), COPD (chronic obstructive pulmonary disease) ([facility]), Detached retina, Dialysis, EMPHYSEMA, Glaucoma, Gout, Heart burn, Heart murmur, Hypertension, Indigestion, Kidney cyst, Leg pain, bilateral (8/10/2015), On supplemental oxygen therapy, Peripheral vascular disease ([facility]) (8/10/2015), Pneumonia, Primary insomnia (3/22/2018), Proteinuria, PVD (peripheral vascular disease) ([facility]), and Sleep apnea.    Surgical History   has a past surgical history that includes cataract phaco with iol (4/8/08); av fistula creation (2/12/2010); av fistula revision (2/19/2010); av fistulogram (7/23/2010); angioplasty balloon (7/23/2010); av fistulogram (9/17/2010); angioplasty balloon (9/17/2010); vitrectomy posterior (1/18/2011); scleral buckling (1/18/2011); recovery (8/12/2011); vitrectomy posterior (10/11/2011); recovery (7/23/2012); recovery (1/29/2013); recovery (7/2/2013); av fistula thrombolysis (7/2/2013); recovery (12/17/2013); recovery (3/24/2014); recovery (7/29/2014); recovery (3/24/2015); recovery (12/23/2015); gastroscopy with biopsy (8/13/2016); colonoscopy - endo (8/15/2016); endoscopy procedure (3/21/2018); femoral endarterectomy (Left, 1/25/2019); femoral popliteal bypass (Left, 1/25/2019); angiogram (Left, 1/25/2019); other orthopedic surgery (1998); av fistula creation (Right, 2/21/2019); lisa (6/13/2019); cardioversion (6/13/2019); av fistula creation (Right, 8/6/2019); cath placement (Right, 8/6/2019); and stent placement (01/26/2020).    Family History  family history includes Genitourinary () Problems in his maternal aunt; Hypertension in his brother and mother; Lung Disease in his father; Stroke in his mother.    Social History   reports that he quit smoking about 11  years ago. His smoking use included cigarettes. He has a 40.00 pack-year smoking history. He has never used smokeless tobacco. He reports that he does not drink alcohol or use drugs.    Medications  Home Medications     Reviewed by Mee Pittman (Pharmacy [facility]) on 02/06/20 at 0851  Med List Status: Complete   Medication Last Dose Status   albuterol 108 (90 Base) MCG/ACT Aero Soln inhalation aerosol PRN Active   aspirin (ASA) 81 MG Chew Tab chewable tablet 2/5/2020 Active   B Complex-C-Folic Acid (DIALYVITE TABLET) Tab 2/5/2020 Active   Calcium Acetate, Phos Binder, 667 MG Cap 2/5/2020 Active   clopidogrel (PLAVIX) 75 MG Tab 2/5/2020 Active   fluticasone (FLONASE) 50 MCG/ACT nasal spray 2/5/2020 Active   Fluticasone-Umeclidin-Vilant (TRELEGY ELLIPTA) 100-62.5-25 MCG/INH AEROSOL POWDER, BREATH ACTIVATED 2/5/2020 Active   levalbuterol (XOPENEX) 1.25 MG/3ML Nebu Soln PRN Active   montelukast (SINGULAIR) 10 MG Tab 2/5/2020 Active   omeprazole (PRILOSEC) 40 MG delayed-release capsule 2/5/2020 Active   pravastatin (PRAVACHOL) 20 MG Tab 2/5/2020 Active   ROPINIRole (REQUIP) 0.5 MG Tab 2/5/2020 Active   tamsulosin (FLOMAX) 0.4 MG capsule 2/5/2020 Active   torsemide (DEMADEX) 10 MG tablet 2/5/2020 Active   traZODone (DESYREL) 150 MG Tab 2/5/2020 Active   warfarin (COUMADIN) 5 MG Tab 2/5/2020 Active              Current Facility-Administered Medications   Medication Dose Route Frequency Provider Last Rate Last Dose   • NS (BOLUS) infusion 250 mL  250 mL Intravenous DIALYSIS PRN Jerry Slater M.D.       • albumin human 25% solution 12.5 g  12.5 g Intravenous DIALYSIS PRN Jerry Slater M.D.       • lidocaine (XYLOCAINE) 1 % injection 1 mL  1 mL Intradermal PRN Jerry Slater M.D.   1 mL at 02/06/20 1209   • albuterol inhaler 1-2 Puff  1-2 Puff Inhalation Q4HRS PRN Kike Darling M.D.       • fluticasone (FLONASE) nasal spray 100 mcg  2 Spray Nasal DAILY Kike Darling M.D.       • levalbuterol (XOPENEX) 1.25 MG/3ML  nebulizer solution 1.25 mg  1.25 mg Nebulization Q4HRS PRN Kike Darling M.D.       • montelukast (SINGULAIR) tablet 10 mg  10 mg Oral Q EVENING Kike Darling M.D.   10 mg at 02/06/20 1747   • pravastatin (PRAVACHOL) tablet 20 mg  20 mg Oral Nightly Kike Darling M.D.       • ROPINIRole (REQUIP) tablet 1 mg  1 mg Oral DAILY Kike Darling M.D.   1 mg at 02/06/20 1745   • tamsulosin (FLOMAX) capsule 0.8 mg  0.8 mg Oral DAILY Kike Darling M.D.   0.8 mg at 02/06/20 1746   • Respiratory Care per Protocol   Nebulization Continuous RT Kike Darling M.D.       • acetaminophen (TYLENOL) tablet 650 mg  650 mg Oral Q6HRS PRN Kike Darling M.D.       • pantoprazole (PROTONIX) injection 40 mg  40 mg Intravenous BID Kike Darling M.D.       • NS infusion   Intravenous Continuous Evan Kennedy M.D.       • fluticasone (FLOVENT HFA) 44 MCG/ACT inhaler 88 mcg  2 Puff Inhalation DAILY Kike Darling M.D.       • [START ON 2/7/2020] umeclidinium-vilanterol (ANORO ELLIPTA) inhaler 1 Puff  1 Puff Inhalation DAILY AT 1800 Evan Kennedy M.D.           Allergies  No Known Allergies    Vital Signs last 24 hours  Temp:  [36 °C (96.8 °F)-37.1 °C (98.7 °F)] 36.1 °C (97 °F)  Pulse:  [] 90  Resp:  [16-30] 24  BP: ()/(42-69) 118/56  SpO2:  [100 %] 100 %    Physical Exam  Physical Exam  Constitutional:       General: He is in acute distress.      Appearance: He is ill-appearing.      Comments: Restless and ill appearing   HENT:      Nose: No congestion.      Mouth/Throat:      Mouth: Mucous membranes are dry.      Pharynx: No oropharyngeal exudate.   Eyes:      Pupils: Pupils are equal, round, and reactive to light.      Comments: Significant pallor   Cardiovascular:      Rate and Rhythm: Tachycardia present.      Heart sounds: Murmur present.   Pulmonary:      Effort: Respiratory distress present.      Breath sounds: No stridor. Rales present. No wheezing  or rhonchi.   Abdominal:      General: There is no distension.      Palpations: There is no mass.      Tenderness: There is no tenderness. There is no guarding or rebound.      Hernia: No hernia is present.      Comments: Right groin bruising   Musculoskeletal:         General: Swelling present. No tenderness.      Comments: Right arm compartment soft with bruising around dialysis site down into right forearm   Skin:     Capillary Refill: Capillary refill takes 2 to 3 seconds.      Coloration: Skin is pale.      Findings: Bruising present.   Neurological:      Mental Status: He is alert and oriented to person, place, and time.      Cranial Nerves: No cranial nerve deficit.      Sensory: No sensory deficit.      Motor: No weakness.      Coordination: Coordination normal.      Gait: Gait normal.   Psychiatric:         Mood and Affect: Mood normal.         Fluids    Intake/Output Summary (Last 24 hours) at 2/6/2020 1905  Last data filed at 2/6/2020 1800  Gross per 24 hour   Intake 2050 ml   Output 3600 ml   Net -1550 ml       Laboratory  Recent Results (from the past 48 hour(s))   CBC WITH DIFFERENTIAL    Collection Time: 02/06/20  6:10 AM   Result Value Ref Range    WBC 5.5 4.8 - 10.8 K/uL    RBC 1.83 (L) 4.70 - 6.10 M/uL    Hemoglobin 5.7 (LL) 14.0 - 18.0 g/dL    Hematocrit 18.0 (L) 42.0 - 52.0 %    MCV 97.8 81.4 - 97.8 fL    MCH 30.6 27.0 - 33.0 pg    MCHC 31.3 (L) 33.7 - 35.3 g/dL    RDW 63.3 (H) 35.9 - 50.0 fL    Platelet Count 178 164 - 446 K/uL    MPV 10.4 9.0 - 12.9 fL    Neutrophils-Polys 86.10 (H) 44.00 - 72.00 %    Lymphocytes 6.90 (L) 22.00 - 41.00 %    Monocytes 5.70 0.00 - 13.40 %    Eosinophils 0.70 0.00 - 6.90 %    Basophils 0.20 0.00 - 1.80 %    Immature Granulocytes 0.40 0.00 - 0.90 %    Nucleated RBC 0.40 /100 WBC    Neutrophils (Absolute) 4.72 1.82 - 7.42 K/uL    Lymphs (Absolute) 0.38 (L) 1.00 - 4.80 K/uL    Monos (Absolute) 0.31 0.00 - 0.85 K/uL    Eos (Absolute) 0.04 0.00 - 0.51 K/uL    Baso  (Absolute) 0.01 0.00 - 0.12 K/uL    Immature Granulocytes (abs) 0.02 0.00 - 0.11 K/uL    NRBC (Absolute) 0.02 K/uL   COMP METABOLIC PANEL    Collection Time: 20  6:10 AM   Result Value Ref Range    Sodium 136 135 - 145 mmol/L    Potassium 6.2 (H) 3.6 - 5.5 mmol/L    Chloride 93 (L) 96 - 112 mmol/L    Co2 17 (L) 20 - 33 mmol/L    Anion Gap 26.0 (H) 0.0 - 11.9    Glucose 97 65 - 99 mg/dL    Bun 132 (HH) 8 - 22 mg/dL    Creatinine 9.45 (HH) 0.50 - 1.40 mg/dL    Calcium 10.9 (H) 8.4 - 10.2 mg/dL    AST(SGOT) 17 12 - 45 U/L    ALT(SGPT) 15 2 - 50 U/L    Alkaline Phosphatase 93 30 - 99 U/L    Total Bilirubin 0.3 0.1 - 1.5 mg/dL    Albumin 3.5 3.2 - 4.9 g/dL    Total Protein 6.0 6.0 - 8.2 g/dL    Globulin 2.5 1.9 - 3.5 g/dL    A-G Ratio 1.4 g/dL   PROTHROMBIN TIME (INR)    Collection Time: 20  6:10 AM   Result Value Ref Range    PT 20.1 (H) 12.0 - 14.6 sec    INR 1.68 (H) 0.87 - 1.13   APTT    Collection Time: 20  6:10 AM   Result Value Ref Range    APTT 30.2 24.7 - 36.0 sec   MAGNESIUM    Collection Time: 20  6:10 AM   Result Value Ref Range    Magnesium 1.7 1.5 - 2.5 mg/dL   PHOSPHORUS    Collection Time: 20  6:10 AM   Result Value Ref Range    Phosphorus 3.4 2.5 - 4.5 mg/dL   COD - Adult (Type and Screen)    Collection Time: 20  6:10 AM   Result Value Ref Range    ABO Grouping Only O     Rh Grouping Only POS     Antibody Screen-Cod NEG    ESTIMATED GFR    Collection Time: 20  6:10 AM   Result Value Ref Range    GFR If  7 (A) >60 mL/min/1.73 m 2    GFR If Non African American 5 (A) >60 mL/min/1.73 m 2   EKG    Collection Time: 20  6:20 AM   Result Value Ref Range    Report       [facility] Emergency Dept.    Test Date:  2020  Pt Name:    EDVIN GREEN       Department: EDS  MRN:        [id number]                      [facility]   Gender:     Male                         Technician: ARSEN  :        1940                    Requested By:HARLAN ROCK  Order #:    [id number]                    Reading MD: HARLAN ROCK DO    Measurements  Intervals                                Axis  Rate:       90                           P:          17  TX:         199                          QRS:        -41  QRSD:       155                          T:          77  QT:         419  QTc:        513    Interpretive Statements  Sinus rhythm  RBBB and LAFB  Compared to ECG 2019 13:24:47  Left anterior fascicular block now present  Electronically Signed On 2020 7:07:22 PST by HARLAN ROCK DO     PLATELET MAPPING WITH BASIC TEG    Collection Time: 20  6:40 AM   Result Value Ref Range    Reaction Time Initial-R 6.2 5.0 - 10.0 min    Clot Kinetics-K 1.2 1.0 - 3.0 min    Clot Angle-Angle 72.8 (H) 53.0 - 72.0 degrees    Maximum Clot Strength-MA 71.4 (H) 50.0 - 70.0 mm    Lysis 30 minutes-LY30 0.8 0.0 - 8.0 %    % Inhibition ADP 74.2 %    % Inhibition AA 99.4 %    TEG Algorithm Link Algorithm    EKG    Collection Time: 20  8:03 AM   Result Value Ref Range    Report       [facility] Emergency Dept.    Test Date:  2020  Pt Name:    EDVIN COUGHLINCARLITO GREEN       Department: ER  MRN:        [id number]                      Room:       [facility]  Gender:     Male                         Technician: [id number]  :        1940                   Requested By:SHILA WEATHERS  Order #:    [id number]                    Reading MD:    Measurements  Intervals                                Axis  Rate:       102                          P:          0  TX:         178                          QRS:        -60  QRSD:       150                          T:          50  QT:         412  QTc:        537    Interpretive Statements  WANDERING PACEMAKER  RIGHT BUNDLE BRANCH BLOCK  Compared to ECG 2020 06:20:36  Wandering atrial pacemaker now present  Sinus rhythm no longer present  Left anterior  fascicular block no longer present     COD - Adult (Type and Screen)    Collection Time: 02/06/20  8:15 AM   Result Value Ref Range    ABO Grouping Only O     Rh Grouping Only POS     Antibody Screen-Cod NEG     Component R       R99                 Red Cells, LR       [id number]   issued       02/06/20   12:09      Product Type R99     Dispense Status issued     Unit Number (Barcoded) [id number]     Product Code (Barcoded) V7656J98     Blood Type (Barcoded) 5100     Component R       R99                 Red Cells, LR       [id number]   issued       02/06/20   13:12      Product Type R99     Dispense Status issued     Unit Number (Barcoded) [id number]     Product Code (Barcoded) U2515N31     Blood Type (Barcoded) 5100     Component R       R99                 Red Cells, LR       [id number]   issued       02/06/20   14:06      Product Type R99     Dispense Status issued     Unit Number (Barcoded) [id number]     Product Code (Barcoded) E6219Y02     Blood Type (Barcoded) 5100    CBC WITH DIFFERENTIAL    Collection Time: 02/06/20  8:21 AM   Result Value Ref Range    WBC 8.6 4.8 - 10.8 K/uL    RBC 1.97 (L) 4.70 - 6.10 M/uL    Hemoglobin 6.1 (L) 14.0 - 18.0 g/dL    Hematocrit 20.0 (L) 42.0 - 52.0 %    .5 (H) 81.4 - 97.8 fL    MCH 31.0 27.0 - 33.0 pg    MCHC 30.5 (L) 33.7 - 35.3 g/dL    RDW 65.9 (H) 35.9 - 50.0 fL    Platelet Count 234 164 - 446 K/uL    MPV 10.6 9.0 - 12.9 fL    Neutrophils-Polys 80.90 (H) 44.00 - 72.00 %    Lymphocytes 11.50 (L) 22.00 - 41.00 %    Monocytes 6.20 0.00 - 13.40 %    Eosinophils 0.60 0.00 - 6.90 %    Basophils 0.20 0.00 - 1.80 %    Immature Granulocytes 0.60 0.00 - 0.90 %    Nucleated RBC 0.00 /100 WBC    Neutrophils (Absolute) 6.95 1.82 - 7.42 K/uL    Lymphs (Absolute) 0.99 (L) 1.00 - 4.80 K/uL    Monos (Absolute) 0.53 0.00 - 0.85 K/uL    Eos (Absolute) 0.05 0.00 - 0.51 K/uL    Baso (Absolute) 0.02 0.00 - 0.12 K/uL    Immature Granulocytes (abs) 0.05 0.00 - 0.11 K/uL     NRBC (Absolute) 0.00 K/uL    Anisocytosis 1+     Macrocytosis 1+     Microcytosis 1+    Comp Metabolic Panel    Collection Time: 02/06/20  8:21 AM   Result Value Ref Range    Sodium 137 135 - 145 mmol/L    Potassium 5.7 (H) 3.6 - 5.5 mmol/L    Chloride 95 (L) 96 - 112 mmol/L    Co2 19 (L) 20 - 33 mmol/L    Anion Gap 23.0 (H) 0.0 - 11.9    Glucose 114 (H) 65 - 99 mg/dL    Bun 150 (HH) 8 - 22 mg/dL    Creatinine 9.31 (HH) 0.50 - 1.40 mg/dL    Calcium 10.7 (H) 8.5 - 10.5 mg/dL    AST(SGOT) 13 12 - 45 U/L    ALT(SGPT) 15 2 - 50 U/L    Alkaline Phosphatase 80 30 - 99 U/L    Total Bilirubin 0.5 0.1 - 1.5 mg/dL    Albumin 3.8 3.2 - 4.9 g/dL    Total Protein 6.1 6.0 - 8.2 g/dL    Globulin 2.3 1.9 - 3.5 g/dL    A-G Ratio 1.7 g/dL   Hepatitis Panel Acute (4 components)    Collection Time: 02/06/20  8:21 AM   Result Value Ref Range    Hepatitis B Surface Antigen Negative Negative    Hepatitis B Cors Ab,IgM Negative Negative    Hepatitis A Virus Ab, IgM Negative Negative    Hepatitis C Antibody Negative Negative   Hep B Surface AB    Collection Time: 02/06/20  8:21 AM   Result Value Ref Range    Hep B Surface Antibody Quant 12.98 (H) 0.00 - 10.00 mIU/mL   ESTIMATED GFR    Collection Time: 02/06/20  8:21 AM   Result Value Ref Range    GFR If  7 (A) >60 mL/min/1.73 m 2    GFR If Non African American 6 (A) >60 mL/min/1.73 m 2   PERIPHERAL SMEAR REVIEW    Collection Time: 02/06/20  8:21 AM   Result Value Ref Range    Peripheral Smear Review see below    PLATELET ESTIMATE    Collection Time: 02/06/20  8:21 AM   Result Value Ref Range    Plt Estimation Normal    MORPHOLOGY    Collection Time: 02/06/20  8:21 AM   Result Value Ref Range    RBC Morphology Present     Poikilocytosis 2+     Ovalocytes 1+     Echinocytes 1+    DIFFERENTIAL COMMENT    Collection Time: 02/06/20  8:21 AM   Result Value Ref Range    Comments-Diff see below    MAGNESIUM    Collection Time: 02/06/20  8:21 AM   Result Value Ref Range     Magnesium 1.8 1.5 - 2.5 mg/dL   ARTERIAL BLOOD GAS w/ O2 (LAB)    Collection Time: 02/06/20  8:41 AM   Result Value Ref Range    Ph 7.46 7.40 - 7.50    Pco2 21.7 (L) 26.0 - 37.0 mmHg    Po2 118.3 (H) 64.0 - 87.0 mmHg    O2 Saturation 97.7 93.0 - 99.0 %    Hco3 15 (L) 17 - 25 mmol/L    Base Excess -8 (L) -4 - 3 mmol/L    Body Temp see below Centigrade   HEMOGLOBIN AND HEMATOCRIT    Collection Time: 02/06/20  5:30 PM   Result Value Ref Range    Hemoglobin 9.4 (L) 14.0 - 18.0 g/dL    Hematocrit 29.3 (L) 42.0 - 52.0 %   POTASSIUM SERUM (K)    Collection Time: 02/06/20  5:30 PM   Result Value Ref Range    Potassium 4.3 3.6 - 5.5 mmol/L       Imaging  CT-ABDOMEN-PELVIS WITH   Final Result      1.  No evidence of retroperitoneal hemorrhage.   2.  Moderate right-sided pleural effusion with associated compressive atelectasis and/or consolidation. Underlying infection is possible.   3.  2 hepatic hypodensities likely representing cysts.   4.  Mild nodularity of the liver surface contour suggestive of cirrhosis.   5.  Diffuse thickening of the left adrenal gland which may be related to underlying adrenal adenomas or adrenal cortical hyperplasia.   6.  Marked bilateral renal atrophy with multiple renal cysts.   7.  Colonic diverticulosis.   8.  Marked atherosclerotic disease.      US-HEMODIALYSIS GRAFT DUPLEX COMP UPPER EXTREMITY   Final Result          Assessment/Plan  Transudative pleural effusion- (present on admission)  Assessment & Plan  Continue to monitor need for drainage    Severe aortic stenosis- (present on admission)  Assessment & Plan  Severe AS EF 60% valve area 0.7 Vmax 4.2 mean 49  RVSP 50  Continue to maintain LV euvolemia and control heart rate for increase fitness of AV valve  Patient is currently being evaluated for TAVR    Hypotension- (present on admission)  Assessment & Plan  Related to acute GI bleed and AS   Serial monitor closely give emergent blood while on dialysis  Monitor need and shock index for  further transfusion and need for pressors.     Patient bruising to right groin post cardiac cath rule out retroperitoneal hemorrhage low likelihood although seems consistent with GI bleed.   Would use norepi or neosynephrine with AS and good function if shock.     Elevated coronary artery calcium score- (present on admission)  Assessment & Plan  Hx of CAD s/p stent last Tuesday   If continue to bleed discuss with cardiology risk benefits of therapy of holding and stopping therapy.   Will hold aspirin and plavix now and re-evaluate hg trending and hemodynamic since TEG w/ mapping shows good inhibition  Continue statin    HELGA (obstructive sleep apnea)- (present on admission)  Assessment & Plan  Hx of continue for obstructive sleep apnea continue CPAP at home settings.     Gastrointestinal hemorrhage with melena  Assessment & Plan  Patient with blueberry dark stools post starting aspirin and plavix on chronic warfarin with acute anemia  Per patient remote hx of gi bleed unclear etiology.   Serial monitor hg transfuse to hg > 8 since active chest pain.   protonix BID  GI consultation  Will likely need cardiac clearence.   Consider emergent reversal of plavix/aspirin with platelets or DDAVP if continue to bleed.     ESRD (end stage renal disease) ([facility])- (present on admission)  Assessment & Plan  Dialysis dependent nephrology consulting.     Hyperkalemia- (present on admission)  Assessment & Plan  S/p emergent dialysis 2/6 with improved K   Serial monitor need for dialysis  Nephrology consulting    Dialysis AV fistula malfunction ([facility])- (present on admission)  Assessment & Plan  Currently functioning with hematoma no signs of compartment syndrome serial monitor closely keep arm elevated    Anemia- (present on admission)  Assessment & Plan  Acute GI bleed with aspirin, plavix and coumadin with acute LAD stent placed 2 days ago  Serial monitor hg with transfusion threshold to hg > 8 for his active chest pain and  CAD  Monitor need for emergent reversal of platelet inhibitions if acute further bleeding discuss with cardiology  DDAVP and Platelet  S/p 3 prbc most recent hg 9.4    SOB (shortness of breath)- (present on admission)  Assessment & Plan  Related to volume overload and poor forward flow for severe AS in hypovolemia  S/p emergent dialysis 2/6     BPH (benign prostatic hypertrophy)- (present on admission)  Assessment & Plan  Hx of hold with current hypotension and monitor need      Discussed patient condition and risk of morbidity and/or mortality with Hospitalist, Family, RN, RT, Pharmacy, Charge nurse / hot rounds and Patient.    The patient remains critically ill acute shock and critical ill with severe cardiac disease and need for close monitoring and blood transfusions.  Critical care time = 113 minutes in directly providing and coordinating critical care and extensive data review.  No time overlap and excludes procedures.

## 2020-02-07 LAB
ANION GAP SERPL CALC-SCNC: 12 MMOL/L (ref 0–11.9)
BUN SERPL-MCNC: 61 MG/DL (ref 8–22)
CALCIUM SERPL-MCNC: 9.5 MG/DL (ref 8.5–10.5)
CHLORIDE SERPL-SCNC: 95 MMOL/L (ref 96–112)
CHOLEST SERPL-MCNC: 107 MG/DL (ref 100–199)
CO2 SERPL-SCNC: 28 MMOL/L (ref 20–33)
CREAT SERPL-MCNC: 5.17 MG/DL (ref 0.5–1.4)
ERYTHROCYTE [DISTWIDTH] IN BLOOD BY AUTOMATED COUNT: 57.8 FL (ref 35.9–50)
GLUCOSE SERPL-MCNC: 97 MG/DL (ref 65–99)
HCT VFR BLD AUTO: 25.8 % (ref 42–52)
HCT VFR BLD AUTO: 26 % (ref 42–52)
HCT VFR BLD AUTO: 26.3 % (ref 42–52)
HDLC SERPL-MCNC: 45 MG/DL
HGB BLD-MCNC: 8.5 G/DL (ref 14–18)
HGB BLD-MCNC: 8.9 G/DL (ref 14–18)
HGB BLD-MCNC: 9.1 G/DL (ref 14–18)
INR PPP: 1.34 (ref 0.87–1.13)
LDLC SERPL CALC-MCNC: 49 MG/DL
MCH RBC QN AUTO: 32.4 PG (ref 27–33)
MCHC RBC AUTO-ENTMCNC: 35 G/DL (ref 33.7–35.3)
MCV RBC AUTO: 92.5 FL (ref 81.4–97.8)
PLATELET # BLD AUTO: 154 K/UL (ref 164–446)
PMV BLD AUTO: 9.9 FL (ref 9–12.9)
POTASSIUM SERPL-SCNC: 4.1 MMOL/L (ref 3.6–5.5)
PROTHROMBIN TIME: 16.9 SEC (ref 12–14.6)
RBC # BLD AUTO: 2.81 M/UL (ref 4.7–6.1)
SODIUM SERPL-SCNC: 135 MMOL/L (ref 135–145)
TRIGL SERPL-MCNC: 65 MG/DL (ref 0–149)
WBC # BLD AUTO: 4 K/UL (ref 4.8–10.8)

## 2020-02-07 PROCEDURE — 85610 PROTHROMBIN TIME: CPT

## 2020-02-07 PROCEDURE — 160048 HCHG OR STATISTICAL LEVEL 1-5: Performed by: INTERNAL MEDICINE

## 2020-02-07 PROCEDURE — 5A1D70Z PERFORMANCE OF URINARY FILTRATION, INTERMITTENT, LESS THAN 6 HOURS PER DAY: ICD-10-PCS | Performed by: INTERNAL MEDICINE

## 2020-02-07 PROCEDURE — 700111 HCHG RX REV CODE 636 W/ 250 OVERRIDE (IP): Performed by: HOSPITALIST

## 2020-02-07 PROCEDURE — 80061 LIPID PANEL: CPT

## 2020-02-07 PROCEDURE — 700111 HCHG RX REV CODE 636 W/ 250 OVERRIDE (IP): Mod: JG | Performed by: INTERNAL MEDICINE

## 2020-02-07 PROCEDURE — C9113 INJ PANTOPRAZOLE SODIUM, VIA: HCPCS | Performed by: HOSPITALIST

## 2020-02-07 PROCEDURE — 99291 CRITICAL CARE FIRST HOUR: CPT | Mod: 25 | Performed by: INTERNAL MEDICINE

## 2020-02-07 PROCEDURE — 85018 HEMOGLOBIN: CPT

## 2020-02-07 PROCEDURE — 90935 HEMODIALYSIS ONE EVALUATION: CPT

## 2020-02-07 PROCEDURE — 700102 HCHG RX REV CODE 250 W/ 637 OVERRIDE(OP): Performed by: INTERNAL MEDICINE

## 2020-02-07 PROCEDURE — P9047 ALBUMIN (HUMAN), 25%, 50ML: HCPCS | Mod: JG | Performed by: INTERNAL MEDICINE

## 2020-02-07 PROCEDURE — 500066 HCHG BITE BLOCK, ECT: Performed by: INTERNAL MEDICINE

## 2020-02-07 PROCEDURE — 160202 HCHG ENDO MINUTES - 1ST 30 MINS LEVEL 3: Performed by: INTERNAL MEDICINE

## 2020-02-07 PROCEDURE — A9270 NON-COVERED ITEM OR SERVICE: HCPCS | Performed by: HOSPITALIST

## 2020-02-07 PROCEDURE — 700102 HCHG RX REV CODE 250 W/ 637 OVERRIDE(OP): Performed by: HOSPITALIST

## 2020-02-07 PROCEDURE — 80048 BASIC METABOLIC PNL TOTAL CA: CPT

## 2020-02-07 PROCEDURE — 85027 COMPLETE CBC AUTOMATED: CPT

## 2020-02-07 PROCEDURE — 700101 HCHG RX REV CODE 250

## 2020-02-07 PROCEDURE — 0DJ08ZZ INSPECTION OF UPPER INTESTINAL TRACT, VIA NATURAL OR ARTIFICIAL OPENING ENDOSCOPIC: ICD-10-PCS | Performed by: INTERNAL MEDICINE

## 2020-02-07 PROCEDURE — A9270 NON-COVERED ITEM OR SERVICE: HCPCS | Performed by: INTERNAL MEDICINE

## 2020-02-07 PROCEDURE — 99156 MOD SED OTH PHYS/QHP 5/>YRS: CPT | Performed by: INTERNAL MEDICINE

## 2020-02-07 PROCEDURE — 770022 HCHG ROOM/CARE - ICU (200)

## 2020-02-07 PROCEDURE — 700111 HCHG RX REV CODE 636 W/ 250 OVERRIDE (IP)

## 2020-02-07 PROCEDURE — 99232 SBSQ HOSP IP/OBS MODERATE 35: CPT | Performed by: INTERNAL MEDICINE

## 2020-02-07 PROCEDURE — 85014 HEMATOCRIT: CPT

## 2020-02-07 RX ORDER — KETAMINE HYDROCHLORIDE 50 MG/ML
200 INJECTION, SOLUTION INTRAMUSCULAR; INTRAVENOUS
Status: DISCONTINUED | OUTPATIENT
Start: 2020-02-07 | End: 2020-02-08 | Stop reason: HOSPADM

## 2020-02-07 RX ORDER — CLOPIDOGREL BISULFATE 75 MG/1
75 TABLET ORAL DAILY
Status: DISCONTINUED | OUTPATIENT
Start: 2020-02-07 | End: 2020-02-08 | Stop reason: HOSPADM

## 2020-02-07 RX ORDER — KETAMINE HYDROCHLORIDE 50 MG/ML
INJECTION, SOLUTION INTRAMUSCULAR; INTRAVENOUS
Status: COMPLETED
Start: 2020-02-07 | End: 2020-02-07

## 2020-02-07 RX ORDER — MIDAZOLAM HYDROCHLORIDE 1 MG/ML
INJECTION INTRAMUSCULAR; INTRAVENOUS
Status: COMPLETED
Start: 2020-02-07 | End: 2020-02-07

## 2020-02-07 RX ORDER — MIDAZOLAM HYDROCHLORIDE 1 MG/ML
1-5 INJECTION INTRAMUSCULAR; INTRAVENOUS
Status: DISCONTINUED | OUTPATIENT
Start: 2020-02-07 | End: 2020-02-08 | Stop reason: HOSPADM

## 2020-02-07 RX ORDER — PHENYLEPHRINE HCL IN 0.9% NACL 0.5 MG/5ML
SYRINGE (ML) INTRAVENOUS
Status: DISCONTINUED
Start: 2020-02-07 | End: 2020-02-07

## 2020-02-07 RX ORDER — PHENYLEPHRINE HYDROCHLORIDE 10 MG/ML
INJECTION, SOLUTION INTRAMUSCULAR; INTRAVENOUS; SUBCUTANEOUS
Status: DISCONTINUED
Start: 2020-02-07 | End: 2020-02-07

## 2020-02-07 RX ORDER — OMEPRAZOLE 20 MG/1
40 CAPSULE, DELAYED RELEASE ORAL DAILY
Status: DISCONTINUED | OUTPATIENT
Start: 2020-02-08 | End: 2020-02-08 | Stop reason: HOSPADM

## 2020-02-07 RX ADMIN — MONTELUKAST 10 MG: 10 TABLET, FILM COATED ORAL at 17:05

## 2020-02-07 RX ADMIN — PANTOPRAZOLE SODIUM 40 MG: 40 INJECTION, POWDER, LYOPHILIZED, FOR SOLUTION INTRAVENOUS at 17:05

## 2020-02-07 RX ADMIN — KETAMINE HYDROCHLORIDE 30 MG: 50 INJECTION INTRAMUSCULAR; INTRAVENOUS at 12:07

## 2020-02-07 RX ADMIN — CLOPIDOGREL BISULFATE 75 MG: 75 TABLET ORAL at 09:12

## 2020-02-07 RX ADMIN — ALBUMIN (HUMAN) 12.5 G: 5 SOLUTION INTRAVENOUS at 06:50

## 2020-02-07 RX ADMIN — ROPINIROLE HYDROCHLORIDE 1 MG: 0.5 TABLET, FILM COATED ORAL at 05:24

## 2020-02-07 RX ADMIN — TRAZODONE HYDROCHLORIDE 100 MG: 50 TABLET ORAL at 21:21

## 2020-02-07 RX ADMIN — PRAVASTATIN SODIUM 20 MG: 20 TABLET ORAL at 21:21

## 2020-02-07 RX ADMIN — PANTOPRAZOLE SODIUM 40 MG: 40 INJECTION, POWDER, LYOPHILIZED, FOR SOLUTION INTRAVENOUS at 05:24

## 2020-02-07 RX ADMIN — MIDAZOLAM HYDROCHLORIDE 1 MG: 1 INJECTION, SOLUTION INTRAMUSCULAR; INTRAVENOUS at 12:08

## 2020-02-07 RX ADMIN — ALBUMIN (HUMAN) 12.5 G: 5 SOLUTION INTRAVENOUS at 07:30

## 2020-02-07 RX ADMIN — FLUTICASONE PROPIONATE 100 MCG: 50 SPRAY, METERED NASAL at 05:23

## 2020-02-07 RX ADMIN — FLUTICASONE PROPIONATE 88 MCG: 44 AEROSOL, METERED RESPIRATORY (INHALATION) at 05:24

## 2020-02-07 ASSESSMENT — ENCOUNTER SYMPTOMS
BLOOD IN STOOL: 1
FEVER: 0
SHORTNESS OF BREATH: 0
ABDOMINAL PAIN: 0

## 2020-02-07 NOTE — H&P
Hospital Medicine History & Physical Note    Date of Service  2/6/2020    Primary Care Physician  Martha Light M.D.    Consultants  Critical care  Vascular surgery  Nephrology    Code Status  Full code    Chief Complaint  Weakness dizziness    History of Presenting Illness  79 y.o. male who presented 2/6/2020 with past medical history of end-stage renal disease, severe aortic stenosis, chronic anticoagulation, atrial flutter, is coming today complaining of shortness of breath, malaise, fatigue, right upper arm swelling, patient went to [facility] and was transferred to [facility] due to hyperkalemia of 6.2 and need for nephrology and vascular surgery consult, patient had recent cardiac stent placement on Tuesday last week and recent fistulogram last Thursday that as per patient's wife vascular surgeon stated that everything looked okay, on Monday patient had problem getting his dialysis done but was able to finished them on Wednesday patient could not get his dialysis done due to problem with his AV fistula, patient went home and is coming today with above symptoms, in the emergency room she was found to have hyperkalemia 6.2 patient received acute treatment at [facility] repeated potassium level at 5.7, nephrology has been consulted patient to start hemodialysis immediately, patient hemoglobin dropped from 10-5.7, patient receiving blood transfusion during dialysis today, patient having low blood pressure and feeling short of breath and having mild chest pain, due to critical condition patient is going to be admitted to ICU for close monitoring, I have discussed case with critical care and ER physician, patient to receive blood transfusion, continue monitoring hemoglobin, hemodialysis today, CT abdomen did not show acute findings or acute bleeding, I have discussed plan of care with patient and patient's wife, all questions answered.  Patient denies any fever chills nausea vomiting  diarrhea constipation no focal weakness no numbness no tingling no coughing, no vision changes.  At this time holding anticoagulation medication.    Review of Systems  Review of Systems   Constitutional: Positive for malaise/fatigue. Negative for chills, fever and weight loss.   HENT: Negative for congestion, nosebleeds and sinus pain.    Eyes: Negative for blurred vision and double vision.   Respiratory: Positive for shortness of breath. Negative for cough, hemoptysis, sputum production and wheezing.    Cardiovascular: Positive for chest pain and leg swelling. Negative for palpitations, orthopnea and claudication.   Gastrointestinal: Negative for abdominal pain, diarrhea, heartburn, nausea and vomiting.   Genitourinary: Negative for dysuria, frequency and urgency.   Musculoskeletal: Negative for back pain, myalgias and neck pain.   Skin: Negative for itching and rash.   Neurological: Positive for dizziness. Negative for tingling, tremors and headaches.   Endo/Heme/Allergies: Negative for environmental allergies. Bruises/bleeds easily.   Psychiatric/Behavioral: Negative for depression, substance abuse and suicidal ideas.       Past Medical History   has a past medical history of Anesthesia, Anticoagulation monitoring, special range, Aortic stenosis, Arterial leg ulcer ([facility]) (11/8/2018), Atrial flutter ([facility]) (6/12/2019), Basal cell carcinoma of left cheek (3/26/2015), BPH (7/14/2009), Bronchitis (12/25/2018), CAD (coronary artery disease) (2/20/2014), CATARACT, CHF (congestive heart failure), NYHA class II, chronic, systolic ([facility]) E F30 in setting of atrial flutter (6/13/2019), Chronic respiratory failure with hypoxia ([facility]) (5/8/2016), CKD (chronic kidney disease) stage 4, GFR 15-29 ml/min ([facility]) (1/15/2010), COPD (chronic obstructive pulmonary disease) ([facility]), Detached retina, Dialysis, EMPHYSEMA, Glaucoma, Gout, Heart burn, Heart murmur, Hypertension, Indigestion, Kidney cyst, Leg pain, bilateral (8/10/2015), On  supplemental oxygen therapy, Peripheral vascular disease (HCC) (8/10/2015), Pneumonia, Primary insomnia (3/22/2018), Proteinuria, PVD (peripheral vascular disease) ([facility]), and Sleep apnea.    Surgical History   has a past surgical history that includes cataract phaco with iol (4/8/08); av fistula creation (2/12/2010); av fistula revision (2/19/2010); av fistulogram (7/23/2010); angioplasty balloon (7/23/2010); av fistulogram (9/17/2010); angioplasty balloon (9/17/2010); vitrectomy posterior (1/18/2011); scleral buckling (1/18/2011); recovery (8/12/2011); vitrectomy posterior (10/11/2011); recovery (7/23/2012); recovery (1/29/2013); recovery (7/2/2013); av fistula thrombolysis (7/2/2013); recovery (12/17/2013); recovery (3/24/2014); recovery (7/29/2014); recovery (3/24/2015); recovery (12/23/2015); gastroscopy with biopsy (8/13/2016); colonoscopy - endo (8/15/2016); endoscopy procedure (3/21/2018); femoral endarterectomy (Left, 1/25/2019); femoral popliteal bypass (Left, 1/25/2019); angiogram (Left, 1/25/2019); other orthopedic surgery (1998); av fistula creation (Right, 2/21/2019); lisa (6/13/2019); cardioversion (6/13/2019); av fistula creation (Right, 8/6/2019); cath placement (Right, 8/6/2019); and stent placement (01/26/2020).     Family History  family history includes Genitourinary () Problems in his maternal aunt; Hypertension in his brother and mother; Lung Disease in his father; Stroke in his mother.     Social History   reports that he quit smoking about 11 years ago. His smoking use included cigarettes. He has a 40.00 pack-year smoking history. He has never used smokeless tobacco. He reports that he does not drink alcohol or use drugs.    Allergies  No Known Allergies    Medications  Prior to Admission Medications   Prescriptions Last Dose Informant Patient Reported? Taking?   B Complex-C-Folic Acid (DIALYVITE TABLET) Tab 2/5/2020 at AM Significant Other No No   Sig: TAKE ONE TABLET BY MOUTH DAILY    Calcium Acetate, Phos Binder, 667 MG Cap 2/5/2020 at UNK Significant Other No No   Sig: TAKE 3 CAPSULES BY MOUTH WITH MEALS (3 MEALS) AND 2 CAPSULES WITH SNACKS (2 SNACKS)   Fluticasone-Umeclidin-Vilant (TRELEGY ELLIPTA) 100-62.5-25 MCG/INH AEROSOL POWDER, BREATH ACTIVATED 2/5/2020 at AM Significant Other No No   Sig: Inhale 1 Inhaler by mouth every day.   ROPINIRole (REQUIP) 0.5 MG Tab 2/5/2020 at AM Significant Other No No   Sig: TAKE TWO TABLETS BY MOUTH DAILY   albuterol 108 (90 Base) MCG/ACT Aero Soln inhalation aerosol PRN at PRN Significant Other Yes Yes   Sig: Inhale 1-2 Puffs by mouth every four hours as needed for Shortness of Breath.   aspirin (ASA) 81 MG Chew Tab chewable tablet 2/5/2020 at AM Significant Other Yes No   Sig: Take 81 mg by mouth every day.   clopidogrel (PLAVIX) 75 MG Tab 2/5/2020 at AM Significant Other Yes No   Sig: Take 75 mg by mouth every day.   fluticasone (FLONASE) 50 MCG/ACT nasal spray 2/5/2020 at AM Significant Other No No   Sig: Spray 1-2 Sprays in nose every day. Each nostril.   levalbuterol (XOPENEX) 1.25 MG/3ML Nebu Soln PRN at PRN Significant Other No No   Sig: 3 mL by Nebulization route every four hours as needed for Shortness of Breath.   montelukast (SINGULAIR) 10 MG Tab 2/5/2020 at PM Significant Other No No   Sig: Take 1 Tab by mouth every evening.   omeprazole (PRILOSEC) 40 MG delayed-release capsule 2/5/2020 at AM Significant Other No No   Sig: TAKE ONE CAPSULE BY MOUTH DAILY   pravastatin (PRAVACHOL) 20 MG Tab 2/5/2020 at PM Significant Other Yes No   Sig: Take 20 mg by mouth every evening.   tamsulosin (FLOMAX) 0.4 MG capsule 2/5/2020 at AM Significant Other Yes Yes   Sig: Take 0.8 mg by mouth every day.   torsemide (DEMADEX) 10 MG tablet 2/5/2020 at AM Significant Other No No   Sig: TAKE THREE TABLETS BY MOUTH DAILY   traZODone (DESYREL) 150 MG Tab 2/5/2020 at PM Significant Other Yes No   Sig: Take 150-300 mg by mouth every bedtime.   warfarin (COUMADIN) 5 MG  Tab 2/5/2020 at PM Significant Other Yes Yes   Sig: Take 5-7.5 mg by mouth See Admin Instructions. 7.5 mg on Monday and Friday   5 mg all other days      Facility-Administered Medications: None       Physical Exam  Temp:  [36 °C (96.8 °F)-37.1 °C (98.7 °F)] 36.1 °C (97 °F)  Pulse:  [] 66  Resp:  [16-30] 28  BP: ()/(42-69) 130/69  SpO2:  [100 %] 100 %    Physical Exam  Vitals signs and nursing note reviewed.   Constitutional:       Appearance: Normal appearance. He is toxic-appearing.   HENT:      Head: Normocephalic and atraumatic.      Nose: Nose normal. No congestion.      Mouth/Throat:      Pharynx: Oropharynx is clear. No oropharyngeal exudate.   Eyes:      General: No scleral icterus.        Right eye: No discharge.         Left eye: No discharge.      Extraocular Movements: Extraocular movements intact.      Conjunctiva/sclera: Conjunctivae normal.      Pupils: Pupils are equal, round, and reactive to light.   Neck:      Musculoskeletal: Normal range of motion and neck supple. No neck rigidity.   Cardiovascular:      Rate and Rhythm: Normal rate and regular rhythm.      Heart sounds: Murmur present.   Pulmonary:      Effort: Pulmonary effort is normal. No respiratory distress.      Breath sounds: Rales present. No wheezing.   Abdominal:      General: Bowel sounds are normal. There is no distension.      Tenderness: There is no tenderness. There is no guarding.   Musculoskeletal: Normal range of motion.         General: Swelling (Right upper arm swelling, thrill present) present.      Right lower leg: Edema (1+) present.      Left lower leg: Edema (1+) present.   Skin:     General: Skin is warm.      Coloration: Skin is pale. Skin is not jaundiced.   Neurological:      General: No focal deficit present.      Mental Status: He is alert and oriented to person, place, and time.      Cranial Nerves: No cranial nerve deficit.   Psychiatric:         Mood and Affect: Mood normal.         Behavior: Behavior  normal.         Laboratory:  Recent Labs     02/06/20 0610 02/06/20  0821   WBC 5.5 8.6   RBC 1.83* 1.97*   HEMOGLOBIN 5.7* 6.1*   HEMATOCRIT 18.0* 20.0*   MCV 97.8 101.5*   MCH 30.6 31.0   MCHC 31.3* 30.5*   RDW 63.3* 65.9*   PLATELETCT 178 234   MPV 10.4 10.6     Recent Labs     02/06/20 0610 02/06/20  0821   SODIUM 136 137   POTASSIUM 6.2* 5.7*   CHLORIDE 93* 95*   CO2 17* 19*   GLUCOSE 97 114*   * 150*   CREATININE 9.45* 9.31*   CALCIUM 10.9* 10.7*     Recent Labs     02/06/20 0610 02/06/20  0821   ALTSGPT 15 15   ASTSGOT 17 13   ALKPHOSPHAT 93 80   TBILIRUBIN 0.3 0.5   GLUCOSE 97 114*     Recent Labs     02/06/20  0610   APTT 30.2   INR 1.68*     No results for input(s): NTPROBNP in the last 72 hours.      No results for input(s): TROPONINT in the last 72 hours.    Urinalysis:    No results found     Imaging:  CT-ABDOMEN-PELVIS WITH   Final Result      1.  No evidence of retroperitoneal hemorrhage.   2.  Moderate right-sided pleural effusion with associated compressive atelectasis and/or consolidation. Underlying infection is possible.   3.  2 hepatic hypodensities likely representing cysts.   4.  Mild nodularity of the liver surface contour suggestive of cirrhosis.   5.  Diffuse thickening of the left adrenal gland which may be related to underlying adrenal adenomas or adrenal cortical hyperplasia.   6.  Marked bilateral renal atrophy with multiple renal cysts.   7.  Colonic diverticulosis.   8.  Marked atherosclerotic disease.      US-HEMODIALYSIS GRAFT DUPLEX COMP UPPER EXTREMITY   Final Result            Assessment/Plan:  I anticipate this patient will require at least two midnights for appropriate medical management, necessitating inpatient admission.    Severe aortic stenosis- (present on admission)  Assessment & Plan  Follows with cardiology as outpatient, chronic anticoagulation on Coumadin which is on hold right now due to acute anemia    Hypotension- (present on admission)  Assessment &  Plan  To be due to bleeding, getting IV blood transfusion, will admit to ICU for close monitoring.    ESRD (end stage renal disease) (HCC)- (present on admission)  Assessment & Plan  To restart hemodialysis today.    Hyperkalemia- (present on admission)  Assessment & Plan  Received emergency IV treatment at [facility], getting hemodialysis today, nephrology consult    Dialysis AV fistula malfunction (HCC)- (present on admission)  Assessment & Plan  Hematoma, patient had recent fistulogram on Thursday apparently vascular surgeon was happy with results, ocular surgery has been consulted, ultrasound showed hematoma, will try to get hemodialysis today.    Anemia- (present on admission)  Assessment & Plan  Acute blood loss, patient has right upper extremity hematoma, patient is on Coumadin and on Plavix due to recent cardiac stent placement, patient getting blood transfusion today, continue monitoring H&H, CT abdomen did not show source of bleeding.    SOB (shortness of breath)- (present on admission)  Assessment & Plan  Likely due to fluid overload from missing dialysis, patient to get dialysis today, nephrology consulted    BPH (benign prostatic hypertrophy)- (present on admission)  Assessment & Plan  Continue Flomax        VTE prophylaxis: SCDs

## 2020-02-07 NOTE — CONSULTS
Gastroenterology Consultation    Date of service: 2/7/2020    Consulting Physician: Margaret Renteria M.D.    History of Present Illness: Parmjit Castelan is a 79 y.o. male with a history as listed below here for shortness of breath after difficulty with dialysis.  Gastroenterology was consulted for concern of acute GI bleeding after drop in hemoglobin noted per labs below.  Patient had cardiac catheterization 1 week ago with aspirin and clopidogrel added to his chronic warfarin.  Patient since that time is noted dark stools daily without fabiana blood.  No coughing or vomiting blood.  No dizziness or lightheadedness except immediately before presentation.  Currently patient denies other symptoms including no chest pain, dyspnea, fever, chills, abdominal pain.    No current facility-administered medications on file prior to encounter.      Current Outpatient Medications on File Prior to Encounter   Medication Sig Dispense Refill   • tamsulosin (FLOMAX) 0.4 MG capsule Take 0.8 mg by mouth every day.     • warfarin (COUMADIN) 5 MG Tab Take 5-7.5 mg by mouth See Admin Instructions. 7.5 mg on Monday and Friday   5 mg all other days     • albuterol 108 (90 Base) MCG/ACT Aero Soln inhalation aerosol Inhale 1-2 Puffs by mouth every four hours as needed for Shortness of Breath.     • aspirin (ASA) 81 MG Chew Tab chewable tablet Take 81 mg by mouth every day.     • clopidogrel (PLAVIX) 75 MG Tab Take 75 mg by mouth every day.     • ROPINIRole (REQUIP) 0.5 MG Tab TAKE TWO TABLETS BY MOUTH DAILY 180 Tab 2   • omeprazole (PRILOSEC) 40 MG delayed-release capsule TAKE ONE CAPSULE BY MOUTH DAILY 30 Cap 2   • Fluticasone-Umeclidin-Vilant (TRELEGY ELLIPTA) 100-62.5-25 MCG/INH AEROSOL POWDER, BREATH ACTIVATED Inhale 1 Inhaler by mouth every day. 1 Each 6   • montelukast (SINGULAIR) 10 MG Tab Take 1 Tab by mouth every evening. 90 Tab 3   • fluticasone (FLONASE) 50 MCG/ACT nasal spray Spray 1-2 Sprays in nose every day. Each nostril. 1  "Bottle 6   • traZODone (DESYREL) 150 MG Tab Take 150-300 mg by mouth every bedtime.     • B Complex-C-Folic Acid (DIALYVITE TABLET) Tab TAKE ONE TABLET BY MOUTH DAILY 90 Tab 3   • Calcium Acetate, Phos Binder, 667 MG Cap TAKE 3 CAPSULES BY MOUTH WITH MEALS (3 MEALS) AND 2 CAPSULES WITH SNACKS (2 SNACKS) 420 Cap 11   • torsemide (DEMADEX) 10 MG tablet TAKE THREE TABLETS BY MOUTH DAILY 270 Tab 3   • levalbuterol (XOPENEX) 1.25 MG/3ML Nebu Soln 3 mL by Nebulization route every four hours as needed for Shortness of Breath. 120 Bullet 11   • pravastatin (PRAVACHOL) 20 MG Tab Take 20 mg by mouth every evening.         Social History     Tobacco Use   • Smoking status: Former Smoker     Packs/day: 1.00     Years: 40.00     Pack years: 40.00     Types: Cigarettes     Last attempt to quit: 2009     Years since quittin.1   • Smokeless tobacco: Never Used   • Tobacco comment: 1 pk a day for 35 yrs, QUIT 2010   Substance Use Topics   • Alcohol use: No     Alcohol/week: 0.0 oz   • Drug use: No        Past Medical History:   Diagnosis Date   • Anesthesia     \"needs more sedation\" (can sometimes hear MD during procedure)    • Anticoagulation monitoring, special range    • Aortic stenosis     mod AS- concern for low flow severe AS with rEFof 30%   • Arterial leg ulcer ([facility]) 2018   • Atrial flutter ([facility]) 2019   • Basal cell carcinoma of left cheek 3/26/2015   • BPH 2009   • Bronchitis 2018   • CAD (coronary artery disease) 2014   • CATARACT     sanjuanita surgery complete   • CHF (congestive heart failure), NYHA class II, chronic, systolic ([facility]) E F30 in setting of atrial flutter 2019   • Chronic respiratory failure with hypoxia ([facility]) 2016   • CKD (chronic kidney disease) stage 4, GFR 15-29 ml/min ([facility]) 1/15/2010    end stage renal disease   • COPD (chronic obstructive pulmonary disease) ([facility])     wears 2.5 L o2 sometimes when he sleeps   • Detached retina    • [facility]   • EMPHYSEMA    • Glaucoma     Early stage   • Gout    • Heart burn     occas   • Heart murmur     maria esther lee cardiologist   • Hypertension    • Indigestion     occas   • Kidney cyst    • Leg pain, bilateral 8/10/2015   • On supplemental oxygen therapy    • Peripheral vascular disease (HCC) 8/10/2015   • Pneumonia    • Primary insomnia 3/22/2018   • Proteinuria    • PVD (peripheral vascular disease) ([facility])    • Sleep apnea     O2 PER CANULA  AT NIGHT 2l/m       Past Surgical History:   Procedure Laterality Date   • STENT PLACEMENT  01/26/2020    lda   • AV FISTULA CREATION Right 8/6/2019    Procedure: CREATION, AV FISTULA -  RIGHT ARM  ,  and Ligation of Left Arm Fistula;  Surgeon: Sera Peters M.D.;  Location: [facility];  Service: General   • CATH PLACEMENT Right 8/6/2019    Procedure: INSERTION, CATHETER - PERMA ,;  Surgeon: Sera Peters M.D.;  Location: [facility];  Service: General   • JUSTIN  6/13/2019    Procedure: ECHOCARDIOGRAM, TRANSESOPHAGEAL;  Surgeon: Harvinder Hoang M.D.;  Location: [facility];  Service: Cardiac   • CARDIOVERSION  6/13/2019    Procedure: CARDIOVERSION;  Surgeon: Harvinder Hoang M.D.;  Location: [facility];  Service: Cardiac   • AV FISTULA CREATION Right 2/21/2019    Procedure: AV FISTULA CREATION - ARM;  Surgeon: David Cason M.D.;  Location: [facility];  Service: General   • FEMORAL ENDARTERECTOMY Left 1/25/2019    Procedure: FEMORAL ENDARTERECTOMY;  Surgeon: David Cason M.D.;  Location: [facility];  Service: General   • FEMORAL POPLITEAL BYPASS Left 1/25/2019    Procedure: LEFT FEMORAL POPLITEAL POLYTETRAFLUOROETHYLENE ePTFE(PROPATEN VASCULAR GRAFT) BYPASS;  Surgeon: David Cason M.D.;  Location: [facility];  Service: General   • ANGIOGRAM Left 1/25/2019    Procedure: LEFT LEG ANGIOGRAM;  Surgeon: David Cason M.D.;  Location:  SURGERY [facility];  Service: General   • ENDOSCOPY PROCEDURE  3/21/2018    Procedure: ENDOSCOPY PROCEDURE/UPPER;  Surgeon: Enrique Child D.O.;  Location: [facility];  Service: Gastroenterology   • COLONOSCOPY - ENDO  8/15/2016    Procedure: COLONOSCOPY - ENDO;  Surgeon: Mane Whatley M.D.;  Location: ENDOSCOPY [facility];  Service:    • GASTROSCOPY WITH BIOPSY  8/13/2016    Procedure: GASTROSCOPY WITH BIOPSY;  Surgeon: Jorge Leavitt M.D.;  Location: ENDOSCOPY [facility];  Service:    • RECOVERY  12/23/2015    Procedure: VASCULAR CASE-CASON-LEFT ARM FISTULOGRAM WITH ANGIOPLASTY;  Surgeon: [facility]only Surgery;  Location: SURGERY PRE-POST PROC UNIT [facility];  Service:    • RECOVERY  3/24/2015    Performed by [facility] at SURGERY PRE-POST PROC UNIT [facility]   • RECOVERY  7/29/2014    Performed by [facility]Recovery Surgery at SURGERY SAME DAY [facility]   • RECOVERY  3/24/2014    Performed by [facility] at SURGERY [facility]   • RECOVERY  12/17/2013    Performed by [facility] at SURGERY SAME DAY [facility]   • RECOVERY  7/2/2013    Performed by [facility] at SURGERY SAME DAY [facility] ORS   • AV FISTULA THROMBOLYSIS  7/2/2013    Performed by David Cason M.D. at SURGERY [facility]   • RECOVERY  1/29/2013    Performed by [facility]Recovery Surgery at SURGERY SAME DAY [facility] ORS   • RECOVERY  7/23/2012    Performed by SURGERY, IR-RECOVERY at SURGERY SAME DAY [facility] ORS   • VITRECTOMY POSTERIOR  10/11/2011    Performed by NAHUM GE at SURGERY SAME DAY [facility] ORS   • RECOVERY  8/12/2011    Performed by SURGERY, IR-RECOVERY at SURGERY SAME DAY [facility] ORS   • VITRECTOMY POSTERIOR  1/18/2011    Performed by NAHUM GE at SURGERY SAME DAY [facility] ORS   • SCLERAL BUCKLING  1/18/2011    Performed by NAHUM GE at SURGERY SAME DAY [facility] ORS   • AV FISTULOGRAM  9/17/2010    Performed by DAVID CASON at [facility]  "ORS   • ANGIOPLASTY BALLOON  9/17/2010    Performed by ALESHIA MOSCOSO at SURGERY [facility] ORS   • AV FISTULOGRAM  7/23/2010    Performed by ALESHIA MOSCOSO at SURGERY [facility] ORS   • ANGIOPLASTY BALLOON  7/23/2010    Performed by ALESHIA MOSCOSO at SURGERY [facility] ORS   • AV FISTULA REVISION  2/19/2010    Performed by WILLIE HATCH at SURGERY [facility] ORS   • AV FISTULA CREATION  2/12/2010    Performed by ALESHIA MOSCOSO at SURGERY [facility] ORS   • CATARACT PHACO WITH IOL  4/8/08    Performed by STACEY PHILLIPS at SURGERY SAME DAY [facility] ORS   • OTHER ORTHOPEDIC SURGERY  1998    right toe for facititis       Allergies: Patient has no known allergies.    Family History   Problem Relation Age of Onset   • Stroke Mother    • Hypertension Mother    • Lung Disease Father         Emphysema, resp failure   • Genitourinary () Problems Maternal Aunt         hematuria   • Hypertension Brother        Vitals:    02/06/20 0758 02/06/20 0759 02/06/20 0901 02/06/20 0910   Height: 1.727 m (5' 8\")      Weight: 72.6 kg (160 lb)      Weight % change since last entry.: 0 %      BP:   107/42 100/47   Pulse:  (!) 105 97 (!) 108   BMI (Calculated): 24.33      Resp:  20 (!) 26 (!) 30   Temp:  37.1 °C (98.7 °F)     TempSrc:  Temporal      02/06/20 0952 02/06/20 1001 02/06/20 1101 02/06/20 1216   Height:       Weight:       Weight % change since last entry.:       BP: 109/49 (!) 95/63 109/48 114/52   Pulse: (!) 105 98 91 100   BMI (Calculated):       Resp:   (!) 25 20   Temp:    36.1 °C (96.9 °F)   TempSrc:    Temporal    02/06/20 1230 02/06/20 1245 02/06/20 1300 02/06/20 1315   Height:       Weight:       Weight % change since last entry.:       BP: 106/56 111/57 (!) 97/51 100/51   Pulse: (!) 105 97 99 100   BMI (Calculated):       Resp: (!) 27 19 19 (!) 22   Temp: 36.3 °C (97.3 °F) 36.3 °C (97.4 °F) 36.2 °C (97.2 °F) 36.1 °C (97 °F)   TempSrc: Temporal Temporal Temporal Temporal    02/06/20 1330 " 02/06/20 1345 02/06/20 1400 02/06/20 1415   Height:       Weight:       Weight % change since last entry.:       BP: 110/55 (!) 99/56 107/55 107/56   Pulse: 100 (!) 102 98 (!) 103   BMI (Calculated):       Resp: 16 20 (!) 25 (!) 22   Temp: 36.1 °C (97 °F) 36.4 °C (97.5 °F) 36.3 °C (97.3 °F) 36 °C (96.8 °F)   TempSrc: Temporal Temporal Temporal Temporal    02/06/20 1430 02/06/20 1445 02/06/20 1456 02/06/20 1500   Height:       Weight:       Weight % change since last entry.:       BP: (!) 99/55 (!) 98/59 116/59 114/53   Pulse: 99 (!) 103 99 100   BMI (Calculated):       Resp: (!) 23 (!) 25 (!) 22 (!) 27   Temp: 36.4 °C (97.5 °F) 36.3 °C (97.4 °F) 36.2 °C (97.2 °F)    TempSrc: Temporal Temporal Temporal     02/06/20 1530 02/06/20 1600 02/06/20 1628 02/06/20 1710   Height:       Weight:       Weight % change since last entry.:       BP: 111/67 132/69 130/69 124/61   Pulse: 100 99 66 94   BMI (Calculated):       Resp: 20 17 (!) 28 20   Temp:   36.1 °C (97 °F)    TempSrc:   Temporal     02/06/20 1730 02/06/20 1800 02/06/20 1900 02/06/20 2000   Height:       Weight:       Weight % change since last entry.:       BP: 118/56  119/61 117/62   Pulse: 88 90 92 90   BMI (Calculated):       Resp: 19 (!) 24 (!) 30 20   Temp:    36.2 °C (97.2 °F)   TempSrc:    Temporal    02/06/20 2100 02/06/20 2200 02/06/20 2300 02/07/20 0000   Height:       Weight:       Weight % change since last entry.:       BP: 110/54 124/65 (!) 95/51 115/57   Pulse: 91 83 80 90   BMI (Calculated):       Resp: (!) 23 19 19 20   Temp:    36.4 °C (97.5 °F)   TempSrc:    Temporal    02/07/20 0100 02/07/20 0200 02/07/20 0300 02/07/20 0400   Height:       Weight:       Weight % change since last entry.:       BP: 116/57 123/53 127/58 100/52   Pulse: 91 95 87 76   BMI (Calculated):       Resp: 20 (!) 32 17 17   Temp:    35.8 °C (96.5 °F)   TempSrc:    Temporal    02/07/20 0500 02/07/20 0600 02/07/20 0630 02/07/20 0700   Height:       Weight:       Weight % change  since last entry.:       BP: 113/59 129/60 115/54 (!) 91/49   Pulse: 89 97 92 90   BMI (Calculated):       Resp: (!) 22 (!) 22 18 17   Temp:  (!) 35.8 °C (96.4 °F)     TempSrc:  Temporal      02/07/20 0730 02/07/20 0800   Height:     Weight:     Weight % change since last entry.:     BP: 117/57 103/57   Pulse: 88 97   BMI (Calculated):     Resp: (!) 29 (!) 25   Temp:     TempSrc:         Physical Examination  General: Sitting up in chair no acute distress  Neuro/Psych: Alert and oriented, answering questions appropriately  HEENT: No conjunctival icterus, protecting airway  CVS: Regular rate and rhythm  Respiratory: No accessory muscle use, equal chest rise  Abdomen/: Soft nontender nondistended  Extremities: Moving all 4  Skin: Warm and dry      Lab Results   Component Value Date/Time    PROTHROMBTM 16.9 (H) 02/07/2020 08:00 AM    INR 1.34 (H) 02/07/2020 08:00 AM      Lab Results   Component Value Date/Time    WBC 4.0 (L) 02/07/2020 05:27 AM    RBC 2.81 (L) 02/07/2020 05:27 AM    HEMOGLOBIN 9.1 (L) 02/07/2020 05:27 AM    HEMATOCRIT 26.0 (L) 02/07/2020 05:27 AM    MCV 92.5 02/07/2020 05:27 AM    MCH 32.4 02/07/2020 05:27 AM    MCHC 35.0 02/07/2020 05:27 AM    MPV 9.9 02/07/2020 05:27 AM    NEUTSPOLYS 80.90 (H) 02/06/2020 08:21 AM    LYMPHOCYTES 11.50 (L) 02/06/2020 08:21 AM    MONOCYTES 6.20 02/06/2020 08:21 AM    EOSINOPHILS 0.60 02/06/2020 08:21 AM    EOSINOPHILS 2 10/26/2019 05:55 PM    BASOPHILS 0.20 02/06/2020 08:21 AM    HYPOCHROMIA 1+ 01/17/2020 12:50 PM    ANISOCYTOSIS 1+ 02/06/2020 08:21 AM      Lab Results   Component Value Date/Time    SODIUM 135 02/07/2020 05:27 AM    POTASSIUM 4.1 02/07/2020 05:27 AM    CHLORIDE 95 (L) 02/07/2020 05:27 AM    CO2 28 02/07/2020 05:27 AM    GLUCOSE 97 02/07/2020 05:27 AM    BUN 61 (H) 02/07/2020 05:27 AM    CREATININE 5.17 (HH) 02/07/2020 05:27 AM    CREATININE 2.1 (H) 05/06/2009 10:08 AM      Recent Labs     02/06/20  0610 02/06/20  0821 02/07/20  0800   ASTSGOT 17 13   --    ALTSGPT 15 15  --    TBILIRUBIN 0.3 0.5  --    ALKPHOSPHAT 93 80  --    GLOBULIN 2.5 2.3  --    INR 1.68*  --  1.34*            Assessment and Recommendations:  Acute macrocytic anemia  Dark stools  Triple anticoagulation therapy  Coronary artery disease status post PCI and stenting  End-stage renal disease on hemodialysis  Severe aortic stenosis  Chronic obstructive pulmonary disease  Decreased ejection fraction  Atrial flutter status post ablation    Patient had dual antiplatelet therapy added to his chronic warfarin regiment 1 week ago after percutaneous coronary intervention and cardiac stenting, with subsequent dark stools and new development of macrocytic anemia.  Rule out acute upper GI bleeding.  Patient currently hemodynamically stable.    -Hold blood thinners as possible per Cardiology recommendations  -Plan for EGD bedside today  -Trend hemoglobin and transfuse per primary team and Cardiology    Don Naik PGY-3, Internal Medicine  Discussed with attending Dr. Darby, [facility]

## 2020-02-07 NOTE — CARE PLAN
Problem: Safety  Goal: Will remain free from falls  Note:   Bed in lowest position. Non-skid socks in place. Personal possessions within reach. Mobility sign on door. Bed-alarm on. Call light within reach. Pt educated regarding fall prevention and states understanding. Erickson fall assessment complete.       Problem: Pain Management  Goal: Pain level will decrease to patient's comfort goal  Intervention: Follow pain managment plan developed in collaboration with patient and Interdisciplinary Team  Note:   Assess pain every two hours. Administer prescribed pain medication per patient request. Reassess pain within two hours of pharmacological administration.

## 2020-02-07 NOTE — PROCEDURES
Procedure: Moderate Sedation  Indication: EGD  I extensively discussed risks and benefits with patient.  He is a high risk candidate for sedation due to his severe aortic stenosis and recent stent.    Time out called prior to procedure  Monitoring: pulse ox, tele, EtCO2, BP q1min  Medications: versed 1mg, ketamine 30mg  Complications: atrial vs sinus tachycardia up to 100bpm.  He appears to have multiple p-wave morphologies.  HR ranges from 70-90s, but with multiple P morphologies and some ectopy.  Bp stable throughout  Start time: 12:01  End time: 12:20.  I personally monitored the patient for an extended period of time after EGD was complete due to his high risk of cardiac complications of anesthesia

## 2020-02-07 NOTE — ASSESSMENT & PLAN NOTE
Received emergency IV treatment at [facility], getting hemodialysis today, nephrology consult

## 2020-02-07 NOTE — PROGRESS NOTES
Critical Care Progress Note    Date of admission  2/6/2020    Chief Complaint  79 y.o. male admitted 2/6/2020 with fistula malfunction, GIB    Hospital Course    79 y.o. male who presented 2/6/2020 with Aortic stenosis that is being evaluated for a TAVR, afib, CAD s/p LAD stent on Tuesday, CHF, ckd on dialysis, COPD, hypertension that was transferred from [facility] for higher level of care. That recent had a fistulagram Patient describes he was placed on aspirin and plavix for a stent to his LAD on Tuesday of this week. and also takes coumadin for afib and recent TAVR that describes having multiple attempts without success that then developed swelling and bruising in his arm. He also noticed bruising at multiple other place at groin puncture site and notice dark black stools that thought it was related to having blue berries. He has had a prior GI bleed but doesn't remember. Patient describes feeling week and shortness of breath and chest tightness. Patient was afebrile and hr was 's SBP was 128 that dropped to 95/63   He was found to have a hg of 5.7 plt of 178, inr of 1.68 teg with 99% and 74% inhibition. I was asked to consult patient was on emergent dialysis and received 1 prbc. K was 6.2 co2 was 17 with repeat k of 5.7 after treatment. CXR with pulmonary edema and right side effusion. On multiple re-assessments patient color return hypotension improved while on dialysis and 3 units of PRBC and chest pain/tightiness resolved.       Interval Problem Update  Reviewed last 24 hour events:  Neuro: oriented  HR: SR  SBP: 90s-120  Tmax: AF  GI: BM PTA  I/O: anuric  Lines: PIVs  Resp: room air   Vte: contraindicated  PPI/H2:PPI  Antibx: n/a    plavix only     Review of Systems  Review of Systems   Respiratory: Negative for shortness of breath.    Cardiovascular: Negative for chest pain.   Gastrointestinal: Positive for blood in stool and melena.        Vital Signs for last 24 hours   Temp:  [35.8 °C (96.4  °F)-37.1 °C (98.7 °F)] 35.8 °C (96.4 °F)  Pulse:  [] 97  Resp:  [16-32] 22  BP: ()/(42-69) 129/60  SpO2:  [94 %-100 %] 97 %    Hemodynamic parameters for last 24 hours       Respiratory Information for the last 24 hours       Physical Exam   Physical Exam  Constitutional:       Appearance: Normal appearance.   HENT:      Mouth/Throat:      Mouth: Mucous membranes are moist.   Eyes:      Pupils: Pupils are equal, round, and reactive to light.   Cardiovascular:      Rate and Rhythm: Normal rate. Rhythm irregular.      Heart sounds: Murmur present.   Pulmonary:      Effort: Pulmonary effort is normal.      Breath sounds: Normal breath sounds.   Abdominal:      General: Bowel sounds are normal.      Tenderness: There is no tenderness.   Musculoskeletal:      Right lower leg: No edema.      Left lower leg: No edema.   Skin:     Findings: Bruising (extensive ecchymosis over R arm) present.   Neurological:      Mental Status: He is alert and oriented to person, place, and time.   Psychiatric:         Mood and Affect: Mood normal.         Medications  Current Facility-Administered Medications   Medication Dose Route Frequency Provider Last Rate Last Dose   • NS (BOLUS) infusion 250 mL  250 mL Intravenous DIALYSIS PRN Jerry Slater M.D.       • albumin human 25% solution 12.5 g  12.5 g Intravenous DIALYSIS PRN Jerry Slater M.D. 150 mL/hr at 02/07/20 0650 12.5 g at 02/07/20 0650   • lidocaine (XYLOCAINE) 1 % injection 1 mL  1 mL Intradermal PRN Jerry Slater M.D.   1 mL at 02/06/20 1209   • albuterol inhaler 1-2 Puff  1-2 Puff Inhalation Q4HRS PRN Kike Darling M.D.       • fluticasone (FLONASE) nasal spray 100 mcg  2 Spray Nasal DAILY Kike Darling M.D.   100 mcg at 02/07/20 0523   • levalbuterol (XOPENEX) 1.25 MG/3ML nebulizer solution 1.25 mg  1.25 mg Nebulization Q4HRS PRN Kike Darling M.D.       • montelukast (SINGULAIR) tablet 10 mg  10 mg Oral Q EVENING Kike Darling M.D.   10 mg  at 02/06/20 1747   • pravastatin (PRAVACHOL) tablet 20 mg  20 mg Oral Nightly Kike Darling M.D.   20 mg at 02/06/20 2050   • ROPINIRole (REQUIP) tablet 1 mg  1 mg Oral DAILY Kike Darling M.D.   1 mg at 02/07/20 0524   • Respiratory Care per Protocol   Nebulization Continuous RT Kike Darling M.D.       • acetaminophen (TYLENOL) tablet 650 mg  650 mg Oral Q6HRS PRN Kike Darling M.D.       • pantoprazole (PROTONIX) injection 40 mg  40 mg Intravenous BID Kike aDrling M.D.   40 mg at 02/07/20 0524   • fluticasone (FLOVENT HFA) 44 MCG/ACT inhaler 88 mcg  2 Puff Inhalation DAILY Kike Darling M.D.   88 mcg at 02/07/20 0524   • umeclidinium-vilanterol (ANORO ELLIPTA) inhaler 1 Puff  1 Puff Inhalation DAILY AT 1800 Evan Kennedy M.D.       • traZODone (DESYREL) tablet 100 mg  100 mg Oral QHS Keny Butler M.D.   100 mg at 02/06/20 2208   • oxyCODONE immediate-release (ROXICODONE) tablet 5 mg  5 mg Oral Q4HRS PRN Keny Butler M.D.           Fluids    Intake/Output Summary (Last 24 hours) at 2/7/2020 0729  Last data filed at 2/7/2020 0200  Gross per 24 hour   Intake 2690 ml   Output 3600 ml   Net -910 ml       Laboratory  Recent Labs     02/06/20  0841   WOKDY99N 7.46   DATMUR523N 21.7*   IYSRN629A 118.3*   MTID5JCF 97.7   ARTHCO3 15*   ARTBE -8*         Recent Labs     02/06/20  0610 02/06/20  0821 02/06/20  1730 02/07/20  0527   SODIUM 136 137  --  135   POTASSIUM 6.2* 5.7* 4.3 4.1   CHLORIDE 93* 95*  --  95*   CO2 17* 19*  --  28   * 150*  --  61*   CREATININE 9.45* 9.31*  --  5.17*   MAGNESIUM 1.7 1.8  --   --    PHOSPHORUS 3.4  --   --   --    CALCIUM 10.9* 10.7*  --  9.5     Recent Labs     02/06/20 0610 02/06/20  0821 02/07/20  0527   ALTSGPT 15 15  --    ASTSGOT 17 13  --    ALKPHOSPHAT 93 80  --    TBILIRUBIN 0.3 0.5  --    GLUCOSE 97 114* 97     Recent Labs     02/06/20 0610 02/06/20  0821 02/07/20  0527   WBC 5.5 8.6 4.0*   NEUTSPOLYS 86.10* 80.90*   --    LYMPHOCYTES 6.90* 11.50*  --    MONOCYTES 5.70 6.20  --    EOSINOPHILS 0.70 0.60  --    BASOPHILS 0.20 0.20  --    ASTSGOT 17 13  --    ALTSGPT 15 15  --    ALKPHOSPHAT 93 80  --    TBILIRUBIN 0.3 0.5  --      Recent Labs     02/06/20  0610 02/06/20  0821 02/06/20  1730 02/07/20  0005 02/07/20  0527   RBC 1.83* 1.97*  --   --  2.81*   HEMOGLOBIN 5.7* 6.1* 9.4* 8.9* 9.1*   HEMATOCRIT 18.0* 20.0* 29.3* 26.3* 26.0*   PLATELETCT 178 234  --   --  154*   PROTHROMBTM 20.1*  --   --   --   --    APTT 30.2  --   --   --   --    INR 1.68*  --   --   --   --        Imaging  CT:    Reviewed    Assessment/Plan  Transudative pleural effusion- (present on admission)  Assessment & Plan  Continue to monitor need for drainage    Severe aortic stenosis- (present on admission)  Assessment & Plan  Severe AS EF 60% valve area 0.7 Vmax 4.2 mean 49  RVSP 50  Continue to maintain LV euvolemia and control heart rate for increase fitness of AV valve  Patient is currently being evaluated for TAVR    Hypotension- (present on admission)  Assessment & Plan  Related to acute GI bleed and AS   Serial monitor closely give emergent blood while on dialysis  Monitor need and shock index for further transfusion and need for pressors.     Patient bruising to right groin post cardiac cath rule out retroperitoneal hemorrhage low likelihood although seems consistent with GI bleed.   Would use norepi or neosynephrine with AS and good function if shock.     Elevated coronary artery calcium score- (present on admission)  Assessment & Plan  Hx of CAD s/p stent last Tuesday   If continue to bleed discuss with cardiology risk benefits of therapy of holding and stopping therapy.   Will hold aspirin and plavix now and re-evaluate hg trending and hemodynamic since TEG w/ mapping shows good inhibition  Continue statin    HELGA (obstructive sleep apnea)- (present on admission)  Assessment & Plan  Hx of continue for obstructive sleep apnea continue CPAP at home  settings.     Gastrointestinal hemorrhage with melena  Assessment & Plan  Patient with blueberry dark stools post starting aspirin and plavix on chronic warfarin with acute anemia  Per patient remote hx of gi bleed unclear etiology.   Serial monitor hg transfuse to hg > 8 since active chest pain.   protonix BID  GI consultation  Will likely need cardiac clearence.   Consider emergent reversal of plavix/aspirin with platelets or DDAVP if continue to bleed.     ESRD (end stage renal disease) (HCC)- (present on admission)  Assessment & Plan  Dialysis dependent nephrology consulting.     Hyperkalemia- (present on admission)  Assessment & Plan  S/p emergent dialysis 2/6 with improved K   Serial monitor need for dialysis  Nephrology consulting    Dialysis AV fistula malfunction (HCC)- (present on admission)  Assessment & Plan  Currently functioning with hematoma no signs of compartment syndrome serial monitor closely keep arm elevated    Anemia- (present on admission)  Assessment & Plan  Acute GI bleed with aspirin, plavix and coumadin with acute LAD stent placed 2 days ago  Serial monitor hg with transfusion threshold to hg > 8 for his active chest pain and CAD  Monitor need for emergent reversal of platelet inhibitions if acute further bleeding discuss with cardiology  DDAVP and Platelet  S/p 3 prbc most recent hg 9.4    SOB (shortness of breath)- (present on admission)  Assessment & Plan  Related to volume overload and poor forward flow for severe AS in hypovolemia  S/p emergent dialysis 2/6     BPH (benign prostatic hypertrophy)- (present on admission)  Assessment & Plan  Hx of hold with current hypotension and monitor need         VTE:  Contraindicated  Ulcer: PPI  Lines: None    I have performed a physical exam and reviewed and updated ROS and Plan today (2/7/2020). In review of yesterday's note (2/6/2020), there are no changes except as documented above.     Discussed patient condition and risk of morbidity and/or  mortality with Family, RN, RT, Charge nurse / hot rounds, Patient and GI  The patient remains critically ill with GI hemorrhage in the setting of multiple anticoagulants and antiPLT agents.  Critical care time = 40 minutes in directly providing and coordinating critical care and extensive data review.  No time overlap and excludes procedures.

## 2020-02-07 NOTE — ASSESSMENT & PLAN NOTE
Likely due to fluid overload from missing dialysis, patient to get dialysis today, nephrology consulted

## 2020-02-07 NOTE — PROGRESS NOTES
[facility],remains swollen but cannulated without any difficulty.Albumin given x 2 for bp support when bp dropped in the 90's.Net UF removed 2500 ml.Report given to primary Rn.

## 2020-02-07 NOTE — ASSESSMENT & PLAN NOTE
Severe AS EF 60% valve area 0.7 Vmax 4.2 mean 49  RVSP 50  Continue to maintain LV euvolemia and control heart rate for increase fitness of AV valve  Patient is currently being evaluated for TAVR

## 2020-02-07 NOTE — ASSESSMENT & PLAN NOTE
Acute blood loss, patient has right upper extremity hematoma, patient is on Coumadin and on Plavix due to recent cardiac stent placement, patient getting blood transfusion today, continue monitoring H&H, CT abdomen did not show source of bleeding.

## 2020-02-07 NOTE — PROGRESS NOTES
2 RN Skin Check    2 RN skin check complete with Adilia RN.   Devices in place: Nasal Cannula.  Skin assessed under devices: yes.  Confirmed pressure ulcers found on: N/A.  New potential pressure ulcers noted on N/A. Wound consult placed No.  The following interventions in place Pillows.      Right upper arm swelling/hematoma  Generalized bruising throughout   Scab on right hand   Right great toe scab  Skin dry and fragile   All other skin intact

## 2020-02-07 NOTE — ASSESSMENT & PLAN NOTE
Related to volume overload and poor forward flow for severe AS in hypovolemia  S/p emergent dialysis 2/6

## 2020-02-07 NOTE — ASSESSMENT & PLAN NOTE
Follows with cardiology as outpatient, chronic anticoagulation on Coumadin which is on hold right now due to acute anemia

## 2020-02-07 NOTE — ASSESSMENT & PLAN NOTE
Hemodynamically stable  GI consult  plavix only, hold warfarin and ASA  Serial Hgb  Maintain good IV access  TEG normal this morning

## 2020-02-07 NOTE — OR SURGEON
Immediate Post OP Note    PreOp Diagnosis:  Melena  Acute drop HGB 10 to 6  PostOp Diagnosis:  Schatzki's rin g mild  HH small  Esophagitis mild  Procedure(s):  GASTROSCOPY    Surgeon(s):  Jong Carrasquillo M.D.    Anesthesiologist/Type of Anesthesia:  Per ICU attending    Surgical Staff:  Endoscopy Technician: (Unknown)  Endoscopy Nurse: (Unknown)    Specimens removed if any:  NA    Estimated Blood Loss:NA    Findings: se above    Complications: None immediate      Rec:  GERD measures  PPI short term     Will sign off call with questions.      2/7/2020 12:08 PM Jong Carrasquillo M.D.

## 2020-02-07 NOTE — PROGRESS NOTES
1203:Dr. Millan and Gi at bedside for EGD.   1209: Bedside EGD finished. See MAR for medications given; see flowsheet for frequent vital signs throughout procedure.

## 2020-02-07 NOTE — PROCEDURES
DATE OF SERVICE:  02/07/2020    PROCEDURE:  Esophagogastroduodenoscopy.    PREOPERATIVE DIAGNOSES:  1.  Drop in hemoglobin from 10 to 6.  2.  Melena.  3.  Triple anticoagulation started 1 week ago with Plavix, aspirin and   Coumadin approximately 1 week after cardiac catheterization.    POSTOPERATIVE DIAGNOSES:  1.  Mild esophagitis.  2.  Partial nonobstructing Schatzki's ring.  3.  Small hiatal hernia; otherwise, negative.    PROCEDURE:  EGD, diagnostic.    CONSENT:  The patient was consented.    SEDATION:  Per Dr. Renteria, ICU attending with deep sedation.    DESCRIPTION OF PROCEDURE:  The patient was positioned in the left lateral   decubitus.  Written informed consent was obtained.    The Olympus diagnostic upper endoscope was advanced without difficulty up to   second portion of duodenum.  It was withdrawn in stomach and retroflexed   before complete removal.    No immediate complications occurred.  There was no blood loss associated with   the procedure.  The patient tolerated the procedure well.    FINDINGS:  Esophagus:  1.  Partial Schatzki's ring over one-third of the circumference minimal   however and not completely nonobstructing.  2.  Mild esophagitis around the gastroesophageal junction.  3.  Small hiatal hernia.  Stomach:  Normal.  Duodenum:  Normal.    RECOMMENDATION:  1.  Advance diet.  2.  Short-term PPI.  3.  Follow up as needed.       ____________________________________     Jong Carrasquillo MD EMD / DELANO    DD:  02/07/2020 12:13:15  DT:  02/07/2020 12:43:52    D#:  [id number]  Job#:  [id number]

## 2020-02-07 NOTE — ASSESSMENT & PLAN NOTE
Acute GI bleed with aspirin, plavix and coumadin with acute LAD stent placed 10d  EGD negative  F/u with GI as OP

## 2020-02-07 NOTE — ASSESSMENT & PLAN NOTE
Hematoma, patient had recent fistulogram on Thursday apparently vascular surgeon was happy with results, ocular surgery has been consulted, ultrasound showed hematoma, will try to get hemodialysis today.

## 2020-02-07 NOTE — ASSESSMENT & PLAN NOTE
Currently functioning with hematoma no signs of compartment syndrome serial monitor closely keep arm elevated

## 2020-02-08 VITALS
BODY MASS INDEX: 24.09 KG/M2 | SYSTOLIC BLOOD PRESSURE: 135 MMHG | RESPIRATION RATE: 18 BRPM | TEMPERATURE: 97.6 F | HEIGHT: 68 IN | OXYGEN SATURATION: 97 % | HEART RATE: 100 BPM | WEIGHT: 158.95 LBS | DIASTOLIC BLOOD PRESSURE: 64 MMHG

## 2020-02-08 PROBLEM — K92.1 GASTROINTESTINAL HEMORRHAGE WITH MELENA: Status: RESOLVED | Noted: 2020-02-06 | Resolved: 2020-02-08

## 2020-02-08 PROBLEM — R06.02 SOB (SHORTNESS OF BREATH): Status: RESOLVED | Noted: 2017-03-16 | Resolved: 2020-02-08

## 2020-02-08 PROBLEM — T82.590A DIALYSIS AV FISTULA MALFUNCTION (HCC): Status: RESOLVED | Noted: 2020-02-06 | Resolved: 2020-02-08

## 2020-02-08 LAB
ANION GAP SERPL CALC-SCNC: 13 MMOL/L (ref 0–11.9)
BASOPHILS # BLD AUTO: 0.6 % (ref 0–1.8)
BASOPHILS # BLD: 0.03 K/UL (ref 0–0.12)
BUN SERPL-MCNC: 55 MG/DL (ref 8–22)
CALCIUM SERPL-MCNC: 10.1 MG/DL (ref 8.5–10.5)
CHLORIDE SERPL-SCNC: 96 MMOL/L (ref 96–112)
CO2 SERPL-SCNC: 29 MMOL/L (ref 20–33)
CREAT SERPL-MCNC: 5.64 MG/DL (ref 0.5–1.4)
EOSINOPHIL # BLD AUTO: 0.1 K/UL (ref 0–0.51)
EOSINOPHIL NFR BLD: 2.1 % (ref 0–6.9)
ERYTHROCYTE [DISTWIDTH] IN BLOOD BY AUTOMATED COUNT: 62 FL (ref 35.9–50)
GLUCOSE SERPL-MCNC: 95 MG/DL (ref 65–99)
HCT VFR BLD AUTO: 30.8 % (ref 42–52)
HCT VFR BLD AUTO: 32.4 % (ref 42–52)
HGB BLD-MCNC: 10.1 G/DL (ref 14–18)
HGB BLD-MCNC: 10.5 G/DL (ref 14–18)
IMM GRANULOCYTES # BLD AUTO: 0.01 K/UL (ref 0–0.11)
IMM GRANULOCYTES NFR BLD AUTO: 0.2 % (ref 0–0.9)
LYMPHOCYTES # BLD AUTO: 0.67 K/UL (ref 1–4.8)
LYMPHOCYTES NFR BLD: 14.3 % (ref 22–41)
MCH RBC QN AUTO: 31.3 PG (ref 27–33)
MCHC RBC AUTO-ENTMCNC: 32.4 G/DL (ref 33.7–35.3)
MCV RBC AUTO: 96.4 FL (ref 81.4–97.8)
MONOCYTES # BLD AUTO: 0.55 K/UL (ref 0–0.85)
MONOCYTES NFR BLD AUTO: 11.8 % (ref 0–13.4)
NEUTROPHILS # BLD AUTO: 3.32 K/UL (ref 1.82–7.42)
NEUTROPHILS NFR BLD: 71 % (ref 44–72)
NRBC # BLD AUTO: 0 K/UL
NRBC BLD-RTO: 0 /100 WBC
PLATELET # BLD AUTO: 199 K/UL (ref 164–446)
PMV BLD AUTO: 10.1 FL (ref 9–12.9)
POTASSIUM SERPL-SCNC: 3.9 MMOL/L (ref 3.6–5.5)
RBC # BLD AUTO: 3.36 M/UL (ref 4.7–6.1)
SODIUM SERPL-SCNC: 138 MMOL/L (ref 135–145)
WBC # BLD AUTO: 4.7 K/UL (ref 4.8–10.8)

## 2020-02-08 PROCEDURE — 700102 HCHG RX REV CODE 250 W/ 637 OVERRIDE(OP): Performed by: INTERNAL MEDICINE

## 2020-02-08 PROCEDURE — 99239 HOSP IP/OBS DSCHRG MGMT >30: CPT | Performed by: HOSPITALIST

## 2020-02-08 PROCEDURE — 85018 HEMOGLOBIN: CPT

## 2020-02-08 PROCEDURE — 85014 HEMATOCRIT: CPT

## 2020-02-08 PROCEDURE — 80048 BASIC METABOLIC PNL TOTAL CA: CPT

## 2020-02-08 PROCEDURE — 700102 HCHG RX REV CODE 250 W/ 637 OVERRIDE(OP): Performed by: HOSPITALIST

## 2020-02-08 PROCEDURE — 99233 SBSQ HOSP IP/OBS HIGH 50: CPT | Performed by: INTERNAL MEDICINE

## 2020-02-08 PROCEDURE — A9270 NON-COVERED ITEM OR SERVICE: HCPCS | Performed by: HOSPITALIST

## 2020-02-08 PROCEDURE — 85025 COMPLETE CBC W/AUTO DIFF WBC: CPT

## 2020-02-08 PROCEDURE — A9270 NON-COVERED ITEM OR SERVICE: HCPCS | Performed by: INTERNAL MEDICINE

## 2020-02-08 RX ADMIN — ROPINIROLE HYDROCHLORIDE 1 MG: 0.5 TABLET, FILM COATED ORAL at 05:02

## 2020-02-08 RX ADMIN — FLUTICASONE PROPIONATE 100 MCG: 50 SPRAY, METERED NASAL at 05:02

## 2020-02-08 RX ADMIN — CLOPIDOGREL BISULFATE 75 MG: 75 TABLET ORAL at 05:02

## 2020-02-08 RX ADMIN — OMEPRAZOLE 40 MG: 20 CAPSULE, DELAYED RELEASE ORAL at 05:02

## 2020-02-08 RX ADMIN — FLUTICASONE PROPIONATE 88 MCG: 44 AEROSOL, METERED RESPIRATORY (INHALATION) at 05:06

## 2020-02-08 ASSESSMENT — ENCOUNTER SYMPTOMS
BLOOD IN STOOL: 1
SHORTNESS OF BREATH: 0
SHORTNESS OF BREATH: 1
ABDOMINAL PAIN: 0
BLOOD IN STOOL: 0
NEUROLOGICAL NEGATIVE: 1
FEVER: 0
HEARTBURN: 1

## 2020-02-08 ASSESSMENT — COGNITIVE AND FUNCTIONAL STATUS - GENERAL
SUGGESTED CMS G CODE MODIFIER MOBILITY: CJ
CLIMB 3 TO 5 STEPS WITH RAILING: A LITTLE
DRESSING REGULAR UPPER BODY CLOTHING: A LITTLE
SUGGESTED CMS G CODE MODIFIER DAILY ACTIVITY: CJ
DRESSING REGULAR LOWER BODY CLOTHING: A LITTLE
STANDING UP FROM CHAIR USING ARMS: A LITTLE
DAILY ACTIVITIY SCORE: 22
MOBILITY SCORE: 22

## 2020-02-08 ASSESSMENT — LIFESTYLE VARIABLES
EVER HAD A DRINK FIRST THING IN THE MORNING TO STEADY YOUR NERVES TO GET RID OF A HANGOVER: NO
AVERAGE NUMBER OF DAYS PER WEEK YOU HAVE A DRINK CONTAINING ALCOHOL: 0
ALCOHOL_USE: NO
EVER FELT BAD OR GUILTY ABOUT YOUR DRINKING: NO
HAVE YOU EVER FELT YOU SHOULD CUT DOWN ON YOUR DRINKING: NO
HAVE PEOPLE ANNOYED YOU BY CRITICIZING YOUR DRINKING: NO
DOES PATIENT WANT TO STOP DRINKING: NO
ON A TYPICAL DAY WHEN YOU DRINK ALCOHOL HOW MANY DRINKS DO YOU HAVE: 0
CONSUMPTION TOTAL: NEGATIVE
TOTAL SCORE: 0
HOW MANY TIMES IN THE PAST YEAR HAVE YOU HAD 5 OR MORE DRINKS IN A DAY: 0
TOTAL SCORE: 0
TOTAL SCORE: 0

## 2020-02-08 NOTE — CARE PLAN
Problem: Communication  Goal: The ability to communicate needs accurately and effectively will improve  Outcome: PROGRESSING AS EXPECTED     Problem: Safety  Goal: Will remain free from injury  Outcome: PROGRESSING AS EXPECTED     Problem: Venous Thromboembolism (VTW)/Deep Vein Thrombosis (DVT) Prevention:  Goal: Patient will participate in Venous Thrombosis (VTE)/Deep Vein Thrombosis (DVT)Prevention Measures  Outcome: PROGRESSING AS EXPECTED   Pt refuses SCDs, but ambulates occasionally and without assistance.    Problem: Knowledge Deficit  Goal: Knowledge of disease process/condition, treatment plan, diagnostic tests, and medications will improve  Outcome: PROGRESSING AS EXPECTED  Goal: Knowledge of the prescribed therapeutic regimen will improve  Outcome: PROGRESSING AS EXPECTED

## 2020-02-08 NOTE — PROGRESS NOTES
"Shift Summary:    Patient complained of a \"rumbly tummy\" over the course of the shift and experiencing minor gas discomfort, but stated it wasn't bad enough for intervention.  Vital signs stable overnight; shallow respirations in the upper 20s to low 30s when patient asleep. Patient ambulated without difficulty this shift with a steady gait, standby assistance.  RUE fistula site remains bruised, swollen and tender to touch/movement. Continuously elevated on pillows overnight.  H&H trending up over the shift, chem panel has not resulted yet with am lab values.  "

## 2020-02-08 NOTE — CARE PLAN
Problem: Communication  Goal: The ability to communicate needs accurately and effectively will improve  Outcome: MET     Problem: Safety  Goal: Will remain free from injury  Outcome: MET  Goal: Will remain free from falls  Outcome: MET     Problem: Infection  Goal: Will remain free from infection  Outcome: MET     Problem: Venous Thromboembolism (VTW)/Deep Vein Thrombosis (DVT) Prevention:  Goal: Patient will participate in Venous Thrombosis (VTE)/Deep Vein Thrombosis (DVT)Prevention Measures  Outcome: MET  Intervention: Assess and monitor for anticoagulation complications  Note:   Patient admitted with active bleeding.  Warfarin and aspirin discontinued.  Patient on Plavix.  SCDs ordered.      Problem: Bowel/Gastric:  Goal: Normal bowel function is maintained or improved  Outcome: MET  Intervention: Educate patient and significant other/support system about diet, fluid intake, medications and activity to promote bowel function  Note:   Patient had bowel movement this morning.  Goal: Will not experience complications related to bowel motility  Outcome: MET     Problem: Knowledge Deficit  Goal: Knowledge of disease process/condition, treatment plan, diagnostic tests, and medications will improve  Outcome: MET  Goal: Knowledge of the prescribed therapeutic regimen will improve  Outcome: MET     Problem: Discharge Barriers/Planning  Goal: Patient's continuum of care needs will be met  Outcome: MET  Intervention: Assess potential discharge barriers on admission and throughout hospital stay  Note:   Patient to follow up with PCP, GI, Cardiology, vascular surgery next week.       Problem: Respiratory:  Goal: Respiratory status will improve  Outcome: MET     Problem: Fluid Volume:  Goal: Will maintain balanced intake and output  Outcome: MET     Problem: Pain Management  Goal: Pain level will decrease to patient's comfort goal  Outcome: MET

## 2020-02-08 NOTE — DISCHARGE INSTRUCTIONS
Discharge Instructions    Discharged to home by car with relative. Discharged via wheelchair, hospital escort: Yes.  Special equipment needed: Not Applicable    Be sure to schedule a follow-up appointment with your primary care doctor or any specialists as instructed.     Discharge Plan:   Diet Plan: Discussed  Activity Level: Discussed  Confirmed Follow up Appointment: Patient to Call and Schedule Appointment  Confirmed Symptoms Management: Discussed  Medication Reconciliation Updated: Yes  Influenza Vaccine Indication: Not indicated: Previously immunized this influenza season and > 8 years of age    I understand that a diet low in cholesterol, fat, and sodium is recommended for good health. Unless I have been given specific instructions below for another diet, I accept this instruction as my diet prescription.   Other diet: Heart healthy, renal healthy diet    Special Instructions: Anemia  Anemia is a condition in which the concentration of red blood cells or hemoglobin in the blood is below normal. Hemoglobin is a substance in red blood cells that carries oxygen to the tissues of the body. Anemia results in not enough oxygen reaching these tissues.  What are the causes?  Common causes of anemia include:  · Excessive bleeding. Bleeding may be internal or external. This includes excessive bleeding from periods (in women) or from the intestine.  · Poor nutrition.  · Chronic kidney, thyroid, and liver disease.  · Bone marrow disorders that decrease red blood cell production.  · Cancer and treatments for cancer.  · HIV, AIDS, and their treatments.  · Spleen problems that increase red blood cell destruction.  · Blood disorders.  · Excess destruction of red blood cells due to infection, medicines, and autoimmune disorders.  What are the signs or symptoms?  · Minor weakness.  · Dizziness.  · Headache.  · Palpitations.  · Shortness of breath, especially with exercise.  · Paleness.  · Cold  sensitivity.  · Indigestion.  · Nausea.  · Difficulty sleeping.  · Difficulty concentrating.  Symptoms may occur suddenly or they may develop slowly.  How is this diagnosed?  Additional blood tests are often needed. These help your health care provider determine the best treatment. Your health care provider will check your stool for blood and look for other causes of blood loss.  How is this treated?  Treatment varies depending on the cause of the anemia. Treatment can include:  · Supplements of iron, vitamin B12, or folic acid.  · Hormone medicines.  · A blood transfusion. This may be needed if blood loss is severe.  · Hospitalization. This may be needed if there is significant continual blood loss.  · Dietary changes.  · Spleen removal.  Follow these instructions at home:  Keep all follow-up appointments. It often takes many weeks to correct anemia, and having your health care provider check on your condition and your response to treatment is very important.  Get help right away if:  · You develop extreme weakness, shortness of breath, or chest pain.  · You become dizzy or have trouble concentrating.  · You develop heavy vaginal bleeding.  · You develop a rash.  · You have bloody or black, tarry stools.  · You faint.  · You vomit up blood.  · You vomit repeatedly.  · You have abdominal pain.  · You have a fever or persistent symptoms for more than 2-3 days.  · You have a fever and your symptoms suddenly get worse.  · You are dehydrated.  This information is not intended to replace advice given to you by your health care provider. Make sure you discuss any questions you have with your health care provider.      Hyperkalemia  Introduction  Hyperkalemia is when you have too much potassium in your blood. Potassium is normally removed (excreted) from your body by your kidneys. If there is too much potassium in your blood, it can affect how your heart works.  Follow these instructions at home:  · Take medicines only as  told by your doctor.  · Do not take any supplements, natural products, herbs, or vitamins unless your doctor says it is okay.  · Limit your alcohol intake as told by your doctor.  · Stop illegal drug use. If you need help quitting, ask your doctor.  · Keep all follow-up visits as told by your doctor. This is important.  · If you have kidney disease, you may need to follow a low potassium diet. A  (dietitian) can help you.  Contact a doctor if:  · Your heartbeat is not regular or very slow.  · You feel dizzy (light-headed).  · You feel weak.  · You feel sick to your stomach (nauseous).  · You have tingling in your hands or feet.  · You cannot feel your hands or feet.  Get help right away if:  · You are short of breath.  · You have chest pain.  · You pass out (faint).  · You cannot move your muscles.  This information is not intended to replace advice given to you by your health care provider. Make sure you discuss any questions you have with your health care provider.      Aortic Valve Stenosis  Aortic valve stenosis is a narrowing of the aortic valve. The aortic valve opens and closes to regulate blood flow between the lower left chamber of the heart (left ventricle) and the blood vessel that leads away from the heart (aorta). When the aortic valve becomes narrow, it makes it difficult for the heart to pump blood into the aorta, which causes the heart to work harder. The extra work can weaken the heart over time.  Aortic valve stenosis can range from mild to severe. If untreated, it can become more severe over time and can lead to heart failure.  What are the causes?  This condition may be caused by:  · Buildup of calcium around and on the valve. This can occur with aging. This is the most common cause of aortic valve stenosis.  · Birth defect.  · Rheumatic fever.  · Radiation to the chest.  What increases the risk?  You may be more likely to develop this condition if:  · You are over the age of  65.  · You were born with an abnormal bicuspid valve.  What are the signs or symptoms?  You may have no symptoms until your condition becomes severe. It may take 10-20 years for mild or moderate aortic valve stenosis to become severe. Symptoms may include:  · Shortness of breath. This may get worse during physical activity.  · Feeling unusually weak and tired (fatigue).  · Extreme discomfort in the chest, neck, or arm (angina).  · A heartbeat that is irregular or faster than normal (palpitations).  · Dizziness or fainting. This may happen when you get physically tired or after you take certain heart medicines, such as nitroglycerin.  How is this diagnosed?  This condition may be diagnosed with:  · A physical exam.  · Echocardiogram. This is a type of imaging test that uses sound waves (ultrasound) to make an image of your heart. There are two types that may be used:  ¨ Transthoracic echocardiogram (TTE). This type of echocardiogram is noninvasive, and it is usually done first.  ¨ Transesophageal echocardiogram (JUSTIN). This type of echocardiogram is done by passing a flexible tube down your esophagus. The heart and the esophagus are close to each other, so your health care provider can take very clear, detailed pictures of the heart using this type of test.  · Cardiac catheterization. In this procedure, a thin, flexible tube (catheter) is passed through a large vein in your neck, groin, or arm. This procedure provides information about arteries, structures, blood pressure, and oxygen levels in your heart.  · Electrocardiogram (ECG). This records the electrical impulses of your heart and assesses heart function.  · Stress tests. These are tests that evaluate the blood supply to your heart and your heart’s response to exercise.  · Blood tests.  You may work with a health care provider who specializes in the heart (cardiologist).  How is this treated?  Treatment depends on how severe your condition is and what your  symptoms are. You will need to have your heart checked regularly to make sure that your condition is not getting worse or causing serious problems. If your condition is mild, no treatment may be needed.  Treatment may include:  · Medicines that help keep your heart rate regular.  · Medicines that thin your blood (anticoagulants) to prevent the formation of blood clots.  · Antibiotic medicines to help prevent infection.  · Surgery to replace your aortic valve. This is the most common treatment for aortic valve stenosis. Several types of surgeries are available. The surgery may be done through a large incision over your heart (open heart surgery), or it may be done using a minimally invasive technique (transcatheter aortic valve replacement, or TAVR).  Follow these instructions at home:  Lifestyle  · Limit alcohol intake to no more than 1 drink per day for nonpregnant women and 2 drinks per day for men. One drink equals 12 oz of beer, 5 oz of wine, or 1½ oz of hard liquor.  · Do not use any tobacco products, such as cigarettes, chewing tobacco, or e-cigarettes. If you need help quitting, ask your health care provider.  · Work with your health care provider to manage your blood pressure and cholesterol.  · Maintain a healthy weight.  Eating and drinking  · Follow instructions from your health care provider about eating or drinking restrictions.  ¨ Limit how much caffeine you drink. Caffeine can affect your heart's rate and rhythm.  · Drink enough fluid to keep your urine clear or pale yellow.  · Eat a heart-healthy diet. This should include plenty of fresh fruits and vegetables. If you eat meat, it should be lean cuts. Avoid foods that are:  ¨ High in salt, saturated fat, or sugar.  ¨ Canned or highly processed.  ¨ Fried.  Activity  · Return to your normal activities as told by your health care provider. Ask your health care provider what activities are safe for you.  · Exercise regularly, as told by your health care  provider. Ask your health care provider what types of exercise are safe for you.  · If your aortic valve stenosis is mild, you may need to avoid only very intense physical activity. The more severe your aortic valve stenosis is, the more activities you may need to avoid.  General instructions  · Take over-the-counter and prescription medicines only as told by your health care provider.  · If you are a woman and you plan to become pregnant, talk with your health care provider before you become pregnant.  · Tell all health care providers who care for you that you have aortic valve stenosis.  · Keep all follow-up visits as told by your health care provider. This is important.  Contact a health care provider if:  · You have a fever.  Get help right away if:  · You develop chest pain or tightness.  · You develop shortness of breath or difficulty breathing.  · You feel light-headed.  · You feel like you might faint.  · Your heartbeat is irregular or faster than normal.  These symptoms may represent a serious problem that is an emergency. Do not wait to see if the symptoms will go away. Get medical help right away. Call your local emergency services (911 in the [place] Do not drive yourself to the hospital.   This information is not intended to replace advice given to you by your health care provider. Make sure you discuss any questions you have with your health care provider.      Chronic Kidney Disease, Adult  Chronic kidney disease (CKD) happens when the kidneys are damaged during a time of 3 or more months. The kidneys are two organs that do many important jobs in the body. These jobs include:  · Removing wastes and extra fluids from the blood.  · Making hormones that maintain the amount of fluid in your tissues and blood vessels.  · Making sure that the body has the right amount of fluids and chemicals.  Most of the time, this condition does not go away, but it can usually be controlled. Steps must be taken to slow  down the kidney damage or stop it from getting worse. Otherwise, the kidneys may stop working.  Follow these instructions at home:  · Follow your diet as told by your doctor. You may need to avoid alcohol, salty foods (sodium), and foods that are high in potassium, calcium, and protein.  · Take over-the-counter and prescription medicines only as told by your doctor. Do not take any new medicines unless your doctor says you can do that. These include vitamins and minerals.  ¨ Medicines and nutritional supplements can make kidney damage worse.  ¨ Your doctor may need to change how much medicine you take.  · Do not use any tobacco products. These include cigarettes, chewing tobacco, and e-cigarettes. If you need help quitting, ask your doctor.  · Keep all follow-up visits as told by your doctor. This is important.  · Check your blood pressure. Tell your doctor if there are changes to your blood pressure.  · Get to a healthy weight. Stay at that weight. If you need help with this, ask your doctor.  · Start or continue an exercise plan. Try to exercise at least 30 minutes a day, 5 days a week.  · Stay up-to-date with your shots (immunizations) as told by your doctor.  Contact a doctor if:  · Your symptoms get worse.  · You have new symptoms.  Get help right away if:  · You have symptoms of end-stage kidney disease. These include:  ¨ Headaches.  ¨ Skin that is darker or lighter than normal.  ¨ Numbness in your hands or feet.  ¨ Easy bruising.  ¨ Having hiccups often.  ¨ Chest pain.  ¨ Shortness of breath.  ¨ Stopping of menstrual periods in women.  · You have a fever.  · You are making very little pee (urine).  · You have pain or bleeding when you pee (urinate).  This information is not intended to replace advice given to you by your health care provider. Make sure you discuss any questions you have with your health care provider.    · Is patient discharged on Warfarin / Coumadin?   No     Depression / Suicide Risk    As  you are discharged from this [facility] facility, it is important to learn how to keep safe from harming yourself.    Recognize the warning signs:  · Abrupt changes in personality, positive or negative- including increase in energy   · Giving away possessions  · Change in eating patterns- significant weight changes-  positive or negative  · Change in sleeping patterns- unable to sleep or sleeping all the time   · Unwillingness or inability to communicate  · Depression  · Unusual sadness, discouragement and loneliness  · Talk of wanting to die  · Neglect of personal appearance   · Rebelliousness- reckless behavior  · Withdrawal from people/activities they love  · Confusion- inability to concentrate     If you or a loved one observes any of these behaviors or has concerns about self-harm, here's what you can do:  · Talk about it- your feelings and reasons for harming yourself  · Remove any means that you might use to hurt yourself (examples: pills, rope, extension cords, firearm)  · Get professional help from the community (Mental Health, Substance Abuse, psychological counseling)  · Do not be alone:Call your Safe Contact- someone whom you trust who will be there for you.  · Call your local CRISIS HOTLINE 051-7136 or 929-343-3916  · Call your local Children's Mobile Crisis Response Team [place] (061) 040-8593 or www.[employer]  · Call the toll free National Suicide Prevention Hotlines   · National Suicide Prevention Lifeline 800-624-VBSH (6626)  · National Hope Line Network 800-SUICIDE (659-1989)

## 2020-02-08 NOTE — PROGRESS NOTES
Gastroenterology Progress Note     Author: Jong Carrasquillo M.D.   Date & Time Created: 2/8/2020 10:28 AM    Chief Complaint:  None    Interval History:  Going home    Mild esophagitis and HH    EGD: NO SOURCE OF UGI bleeding    Review of Systems:  Review of Systems   Constitutional: Positive for malaise/fatigue. Negative for fever.   HENT: Negative.    Respiratory: Positive for shortness of breath.    Gastrointestinal: Positive for heartburn. Negative for abdominal pain and blood in stool.   Genitourinary:        HD CRF   Neurological: Negative.        Physical Exam:  Physical Exam  Constitutional:       General: He is not in acute distress.     Appearance: He is not toxic-appearing.   HENT:      Head: Normocephalic and atraumatic.   Eyes:      General: No scleral icterus.  Pulmonary:      Effort: Pulmonary effort is normal.   Abdominal:      General: There is no distension.   Musculoskeletal:      Comments: Ambulating this AM with walker   Neurological:      General: No focal deficit present.      Mental Status: He is alert.   Psychiatric:         Mood and Affect: Mood normal.         Thought Content: Thought content normal.         Labs:  Recent Labs     02/06/20  0841   SLLUS40C 7.46   EPYQCO740R 21.7*   CUKTG065F 118.3*   TKER1FJM 97.7   ARTHCO3 15*   ARTBE -8*         Recent Labs     02/06/20  0610 02/06/20  0821 02/06/20  1730 02/07/20  0527 02/08/20  0456   SODIUM 136 137  --  135 138   POTASSIUM 6.2* 5.7* 4.3 4.1 3.9   CHLORIDE 93* 95*  --  95* 96   CO2 17* 19*  --  28 29   * 150*  --  61* 55*   CREATININE 9.45* 9.31*  --  5.17* 5.64*   MAGNESIUM 1.7 1.8  --   --   --    PHOSPHORUS 3.4  --   --   --   --    CALCIUM 10.9* 10.7*  --  9.5 10.1     Recent Labs     02/06/20  0610 02/06/20  0821 02/07/20  0527 02/08/20  0456   ALTSGPT 15 15  --   --    ASTSGOT 17 13  --   --    ALKPHOSPHAT 93 80  --   --    TBILIRUBIN 0.3 0.5  --   --    GLUCOSE 97 114* 97 95     Recent Labs     02/06/20  0610  20  0821  20  0527 20  0800 20  1325 20  0038 20  0456   RBC 1.83* 1.97*  --  2.81*  --   --   --  3.36*   HEMOGLOBIN 5.7* 6.1*   < > 9.1*  --  8.5* 10.1* 10.5*   HEMATOCRIT 18.0* 20.0*   < > 26.0*  --  25.8* 30.8* 32.4*   PLATELETCT 178 234  --  154*  --   --   --  199   PROTHROMBTM 20.1*  --   --   --  16.9*  --   --   --    APTT 30.2  --   --   --   --   --   --   --    INR 1.68*  --   --   --  1.34*  --   --   --     < > = values in this interval not displayed.     Recent Labs     20  0610 02/06/20  0821 02/07/20  0527 02/08/20  0456   WBC 5.5 8.6 4.0* 4.7*   NEUTSPOLYS 86.10* 80.90*  --  71.00   LYMPHOCYTES 6.90* 11.50*  --  14.30*   MONOCYTES 5.70 6.20  --  11.80   EOSINOPHILS 0.70 0.60  --  2.10   BASOPHILS 0.20 0.20  --  0.60   ASTSGOT 17 13  --   --    ALTSGPT 15 15  --   --    ALKPHOSPHAT 93 80  --   --    TBILIRUBIN 0.3 0.5  --   --      Hemodynamics:  Temp (24hrs), Av.1 °C (97 °F), Min:36.1 °C (97 °F), Max:36.1 °C (97 °F)  Temperature: 36.1 °C (97 °F)  Pulse  Av.8  Min: 66  Max: 108   Blood Pressure : 141/73     Respiratory:    Respiration: 16, Pulse Oximetry: 97 %     Work Of Breathing / Effort: Mild  RUL Breath Sounds: Clear, RML Breath Sounds: Clear, RLL Breath Sounds: Diminished, SILVANA Breath Sounds: Clear, LLL Breath Sounds: Diminished  Fluids:    Intake/Output Summary (Last 24 hours) at 2020 1028  Last data filed at 2020 0400  Gross per 24 hour   Intake 230 ml   Output 0 ml   Net 230 ml     Weight: 72.1 kg (158 lb 15.2 oz)  GI/Nutrition:  Orders Placed This Encounter   Procedures   • Diet Order Cardiac     Standing Status:   Standing     Number of Occurrences:   1     Order Specific Question:   Diet:     Answer:   Cardiac [6]     Medical Decision Making, by Problem:  Active Hospital Problems    Diagnosis   • 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    Sign off and follow-up with [facility]    Quality-Core Measures

## 2020-02-08 NOTE — DISCHARGE SUMMARY
Discharge Summary    CHIEF COMPLAINT ON ADMISSION  Chief Complaint   Patient presents with   • Shortness of Breath   • Electrolyte Imbalance       Reason for Admission  ems     Admission Date  2/6/2020    CODE STATUS  Full Code    HPI & HOSPITAL COURSE  This is a 79 y.o. male here with weakness.    Mr Castelan has a history of end-stage renal disease on hemodialysis, aortic stenosis, atrial flutter, and coronary artery disease.  He had a cardiac catheterization with stent placed a few weeks ago and is on aspirin and Plavix.  He is also treated with anticoagulation for his atrial flutter.  Patient presented to the emergency room on 2/6/2020 with symptoms of weakness and dizziness.  Patient also reported some issues accessing his fistula.  Initial evaluation demonstrated that he was hyperkalemic and hemodialysis was initiated, he received a blood transfusion with dialysis.  His hemoglobin did drop despite the transfusion and he was admitted to the ICU.  Gastroenterology was consulted and on 2/7/2020 he had an EGD showing a Schatzki's ring and mild esophagitis.  PPI therapy is recommended.    We discussed plan for anticoagulation.  The patient has had a significant drop in his hemoglobin requiring transfusion.  He takes long-term anticoagulation for atrial flutter and is status post ablation.  We discussed risks and benefits of continuing Coumadin.  The patient will hold Coumadin until he is seen by his cardiologist.  If the patient shows signs of further GI bleeding, it may be appropriate for him to have a capsule endoscopy.  He will follow-up with gastroenterology to determine if this is appropriate.      Patient does have a history of very recent PCI with coronary artery stent, again we discussed risks and benefits of antiplatelet therapy.  My recommendation was to continue dual antiplatelet therapy with Plavix and aspirin to prevent stent thrombosis.  The patient is agreeable to this and understands that there is  some increased risk of bleeding and he will seek medical attention immediately if there is signs of blood in his stool or hematemesis.    Patient will follow-up for dialysis on Monday.    Patient will call Dr. Peters's office on Monday and schedule an appointment to discuss the issues with his fistula.    Patient will follow-up with Dr. Godoy Bergman cardiology for ongoing discussion of TAVR and to discuss anticoagulation.    Therefore, he is discharged in fair and stable condition to home with close outpatient follow-up.    The patient met 2-midnight criteria for an inpatient stay at the time of discharge.    Discharge Date  2/8/2020    FOLLOW UP ITEMS POST DISCHARGE  As above    DISCHARGE DIAGNOSES  Active Problems:    Transudative pleural effusion POA: Yes    HELGA (obstructive sleep apnea) POA: Yes    Coronary artery disease POA: Yes      Overview: Coronary calcium was noted on chest CT scan again in 2010. Myocardial       perfusion imaging in 2011 showed no evidence of ischemia or infarction    Severe aortic stenosis POA: Yes    Anemia POA: Yes    Hyperkalemia POA: Yes    ESRD (end stage renal disease) (HCC) POA: Yes    BPH (benign prostatic hypertrophy) POA: Yes      Overview: ICD-10 transition    History of atrial flutter POA: Yes  Resolved Problems:    Hypotension POA: Yes    SOB (shortness of breath) POA: Yes    Hypokalemia POA: Yes    Dialysis AV fistula malfunction (HCC) POA: Yes    Gastrointestinal hemorrhage with melena POA: Yes      FOLLOW UP  Future Appointments   Date Time Provider Department Center   2/11/2020  8:30 AM ILAN Naylor [facility] [address]   2/13/2020  1:15 PM Nadya Durant M.D. [facility] None   3/17/2020  1:00 PM Babak Burch M.D. [facility] None     Martha Light M.D.  [address]  [address]  [place] [id number] [phone]    Schedule an appointment as soon as possible for a visit in 2 days      Jong Carrasquillo M.D.  [address] [place]  [address]  [phone]    Schedule an appointment as soon as possible for a visit      James Mendoza M.D.  [address]  [address]  [place] [id number] [phone]    Schedule an appointment as soon as possible for a visit  Please follow up with someone from his group    Sera Peters M.D.  [address]  [address]  [place] [id number] [phone]    Schedule an appointment as soon as possible for a visit  Re evaluation of right upper arm fistula      MEDICATIONS ON DISCHARGE     Medication List      CONTINUE taking these medications      Instructions   albuterol 108 (90 Base) MCG/ACT Aers inhalation aerosol   Inhale 1-2 Puffs by mouth every four hours as needed for Shortness of Breath.  Dose:  1-2 Puff     aspirin 81 MG Chew chewable tablet  Commonly known as:  ASA   Take 81 mg by mouth every day.  Dose:  81 mg     Calcium Acetate (Phos Binder) 667 MG Caps   TAKE 3 CAPSULES BY MOUTH WITH MEALS (3 MEALS) AND 2 CAPSULES WITH SNACKS (2 SNACKS)     clopidogrel 75 MG Tabs  Commonly known as:  PLAVIX   Take 75 mg by mouth every day.  Dose:  75 mg     DIALYVITE TABLET Tabs   TAKE ONE TABLET BY MOUTH DAILY     fluticasone 50 MCG/ACT nasal spray  Commonly known as:  FLONASE   Spray 1-2 Sprays in nose every day. Each nostril.  Dose:  1-2 Spray     Fluticasone-Umeclidin-Vilant 100-62.5-25 MCG/INH Aepb  Commonly known as:  Trelegy Ellipta   Inhale 1 Inhaler by mouth every day.  Dose:  1 Inhaler     levalbuterol 1.25 MG/3ML Nebu  Commonly known as:  Xopenex   Doctor's comments:  COPD J44.9  3 mL by Nebulization route every four hours as needed for Shortness of Breath.  Dose:  1.25 mg     montelukast 10 MG Tabs  Commonly known as:  SINGULAIR   Take 1 Tab by mouth every evening.  Dose:  10 mg     omeprazole 40 MG delayed-release capsule  Commonly known as:  PRILOSEC   TAKE ONE CAPSULE BY MOUTH DAILY     pravastatin 20 MG Tabs  Commonly known as:  PRAVACHOL   Take 20 mg by mouth every evening.  Dose:  20 mg     ROPINIRole 0.5  MG Tabs  Commonly known as:  REQUIP   TAKE TWO TABLETS BY MOUTH DAILY     tamsulosin 0.4 MG capsule  Commonly known as:  FLOMAX   Take 0.8 mg by mouth every day.  Dose:  0.8 mg     torsemide 10 MG tablet  Commonly known as:  DEMADEX   TAKE THREE TABLETS BY MOUTH DAILY     traZODone 150 MG Tabs  Commonly known as:  DESYREL   Take 150-300 mg by mouth every bedtime.  Dose:  150-300 mg        STOP taking these medications    warfarin 5 MG Tabs  Commonly known as:  COUMADIN            Allergies  No Known Allergies    DIET  Orders Placed This Encounter   Procedures   • Diet Order Cardiac     Standing Status:   Standing     Number of Occurrences:   1     Order Specific Question:   Diet:     Answer:   Cardiac [6]       ACTIVITY  As tolerated.  Weight bearing as tolerated    CONSULTATIONS  Gastroenterology    PROCEDURES  EGD 2/7/2020:     PROCEDURE:  Esophagogastroduodenoscopy.     PREOPERATIVE DIAGNOSES:  1.  Drop in hemoglobin from 10 to 6.  2.  Melena.  3.  Triple anticoagulation started 1 week ago with Plavix, aspirin and   Coumadin approximately 1 week after cardiac catheterization.     POSTOPERATIVE DIAGNOSES:  1.  Mild esophagitis.  2.  Partial nonobstructing Schatzki's ring.  3.  Small hiatal hernia; otherwise, negative.    LABORATORY  Lab Results   Component Value Date    SODIUM 138 02/08/2020    POTASSIUM 3.9 02/08/2020    CHLORIDE 96 02/08/2020    CO2 29 02/08/2020    GLUCOSE 95 02/08/2020    BUN 55 (H) 02/08/2020    CREATININE 5.64 (HH) 02/08/2020    CREATININE 2.1 (H) 05/06/2009        Lab Results   Component Value Date    WBC 4.7 (L) 02/08/2020    HEMOGLOBIN 10.5 (L) 02/08/2020    HEMATOCRIT 32.4 (L) 02/08/2020    PLATELETCT 199 02/08/2020        Total time of the discharge process exceeds 40 minutes.

## 2020-02-08 NOTE — ASSESSMENT & PLAN NOTE
Hold warfarin.  Discussed risks and benefits with patient, and plan to discharge him without warfarin.  He understands the complexity of this decision and is in agreement  H/o ablation  In sinus vs WAP. Does appear to have multiple p-wave morphologies on tele

## 2020-02-08 NOTE — PROGRESS NOTES
Patient taken out in wheelchair to [facility] where his wife will drive him home in their personal vehicle.  Wished patient a speedy recovery.

## 2020-02-08 NOTE — PROGRESS NOTES
Discharge orders received.  IV discontinued.  Instructions, prescriptions and education given to patient.  Follow up appointments discussed.  Patient verbalized understanding of dc instructions and prescriptions.  Patient signed discharge instructions. Patient waiting for his wife to pick him up.  Prior to leaving, they would like to talk with Dr. Pfeiffer.  Patient sitting in chair waiting for his wife.

## 2020-02-08 NOTE — PROGRESS NOTES
Received bedside report from night shift RN. Assumed care of patient. Pt assessed and stable. VSS. Patient reports 0/10 pain at this time.  Discussed plan of care for day with patient and received verbal understanding. Call light within reach, bed in low position.  Educated pt re fall precautions and received verbal understanding.  However, pt continues to refuse bed alarms.  Pt is alert, oriented, steady on feet and calls appropriately.  Dr. Renteria at bedside re discharge and plan.

## 2020-02-08 NOTE — PROGRESS NOTES
Nephrology Daily Progress Note    Date of Service  2/7/2020    Chief Complaint  79 y.o. male with ESRD on HD MWF who presented 2/6/2020 with fatigue and RUE swelling.    Interval Problem Update  2/7 -patient had dialysis this morning with 2.5 L removed.  Patient had dialysis yesterday with 1.7 L removed.  Patient had EGD this morning, which did not show any active bleeding.  Patient denies chest pain, shortness of breath.  Patient expresses frustration with trouble cannulating at outpatient dialysis unit.    Review of Systems  Review of Systems   Constitutional: Negative for fever.   Respiratory: Negative for shortness of breath.    Cardiovascular: Negative for chest pain.   Gastrointestinal: Negative for abdominal pain.   All other systems reviewed and are negative.       Physical Exam  Temp:  [35.8 °C (96.4 °F)-36.4 °C (97.5 °F)] 35.8 °C (96.4 °F)  Pulse:  [] 94  Resp:  [14-38] 26  BP: ()/(49-69) 117/61  SpO2:  [93 %-100 %] 98 %    Physical Exam   Constitutional: He is oriented to person, place, and time. He appears well-developed. No distress.   HENT:   Mouth/Throat: Oropharynx is clear and moist. No oropharyngeal exudate.   Eyes: EOM are normal. No scleral icterus.   Neck: No tracheal deviation present.   Cardiovascular: Normal rate and normal heart sounds.   No murmur heard.  Pulmonary/Chest: Effort normal and breath sounds normal. No stridor. He has no rales.   Abdominal: Soft. He exhibits no distension. There is no tenderness.   Musculoskeletal: Normal range of motion.         General: No edema.   Neurological: He is alert and oriented to person, place, and time.   Skin: Skin is warm and dry. He is not diaphoretic.   Psychiatric: He has a normal mood and affect.   Access: Right brachiocephalic AV fistula, with surrounding ecchymosis, but patent bruit and thrill    Fluids    Intake/Output Summary (Last 24 hours) at 2/7/2020 1654  Last data filed at 2/7/2020 0819  Gross per 24 hour   Intake 1065 ml    Output 2500 ml   Net -1435 ml       Laboratory  Labs reviewed, pertinent labs below.  Recent Labs     02/06/20  0610 02/06/20  0821  02/07/20  0005 02/07/20  0527 02/07/20  1325   WBC 5.5 8.6  --   --  4.0*  --    RBC 1.83* 1.97*  --   --  2.81*  --    HEMOGLOBIN 5.7* 6.1*   < > 8.9* 9.1* 8.5*   HEMATOCRIT 18.0* 20.0*   < > 26.3* 26.0* 25.8*   MCV 97.8 101.5*  --   --  92.5  --    MCH 30.6 31.0  --   --  32.4  --    MCHC 31.3* 30.5*  --   --  35.0  --    RDW 63.3* 65.9*  --   --  57.8*  --    PLATELETCT 178 234  --   --  154*  --    MPV 10.4 10.6  --   --  9.9  --     < > = values in this interval not displayed.     Recent Labs     02/06/20  0610 02/06/20  0821 02/06/20  1730 02/07/20  0527   SODIUM 136 137  --  135   POTASSIUM 6.2* 5.7* 4.3 4.1   CHLORIDE 93* 95*  --  95*   CO2 17* 19*  --  28   GLUCOSE 97 114*  --  97   * 150*  --  61*   CREATININE 9.45* 9.31*  --  5.17*   CALCIUM 10.9* 10.7*  --  9.5     Recent Labs     02/06/20  0610 02/07/20  0800   APTT 30.2  --    INR 1.68* 1.34*           URINALYSIS:  Lab Results   Component Value Date/Time    COLORURINE Yellow 03/19/2018 2009    CLARITY Sl Cloudy (A) 03/19/2018 2009    SPECGRAVITY 1.010 03/19/2018 2009    PHURINE 8.5 (A) 03/19/2018 2009    KETONES Negative 03/19/2018 2009    PROTEINURIN >=300 (A) 03/19/2018 2009    BILIRUBINUR Negative 03/19/2018 2009    NITRITE Negative 03/19/2018 2009    LEUKESTERAS Negative 03/19/2018 2009    OCCULTBLOOD Small (A) 03/19/2018 2009     [facility]  Lab Results   Component Value Date/Time    TOTPROTUR 774.0 (H) 01/09/2012 1644      Lab Results   Component Value Date/Time    CREATININEU 141.57 01/09/2012 1644         Imaging reviewed  CT-ABDOMEN-PELVIS WITH   Final Result      1.  No evidence of retroperitoneal hemorrhage.   2.  Moderate right-sided pleural effusion with associated compressive atelectasis and/or consolidation. Underlying infection is possible.   3.  2 hepatic hypodensities likely representing cysts.   4.   Mild nodularity of the liver surface contour suggestive of cirrhosis.   5.  Diffuse thickening of the left adrenal gland which may be related to underlying adrenal adenomas or adrenal cortical hyperplasia.   6.  Marked bilateral renal atrophy with multiple renal cysts.   7.  Colonic diverticulosis.   8.  Marked atherosclerotic disease.      US-HEMODIALYSIS GRAFT DUPLEX COMP UPPER EXTREMITY   Final Result            Current Facility-Administered Medications   Medication Dose Route Frequency Provider Last Rate Last Dose   • clopidogrel (PLAVIX) tablet 75 mg  75 mg Oral DAILY Margaret Renteria M.D.   75 mg at 02/07/20 0912   • midazolam (VERSED) injection 1-5 mg  1-5 mg Intravenous Once PRN Margaret Renteria M.D.       • ketamine (KETALAR) 50 mg/ml injection  200 mg Intravenous Once PRN Margaret Renteria M.D.       • NS (BOLUS) infusion 250 mL  250 mL Intravenous DIALYSIS PRN Jerry Slater M.D.       • albumin human 25% solution 12.5 g  12.5 g Intravenous DIALYSIS PRN Jerry Slater M.D. 150 mL/hr at 02/07/20 0730 12.5 g at 02/07/20 0730   • lidocaine (XYLOCAINE) 1 % injection 1 mL  1 mL Intradermal PRN Jerry Slater M.D.   1 mL at 02/06/20 1209   • albuterol inhaler 1-2 Puff  1-2 Puff Inhalation Q4HRS PRN Kike Darling M.D.       • fluticasone (FLONASE) nasal spray 100 mcg  2 Spray Nasal DAILY Kike Darling M.D.   100 mcg at 02/07/20 0523   • levalbuterol (XOPENEX) 1.25 MG/3ML nebulizer solution 1.25 mg  1.25 mg Nebulization Q4HRS PRN Kike Darling M.D.       • montelukast (SINGULAIR) tablet 10 mg  10 mg Oral Q EVENING Kike Darling M.D.   10 mg at 02/06/20 1747   • pravastatin (PRAVACHOL) tablet 20 mg  20 mg Oral Nightly Kike Darling M.D.   20 mg at 02/06/20 2050   • ROPINIRole (REQUIP) tablet 1 mg  1 mg Oral DAILY Kike Darling M.D.   1 mg at 02/07/20 0524   • Respiratory Care per Protocol   Nebulization Continuous RT Kike Darling M.D.       • acetaminophen (TYLENOL)  tablet 650 mg  650 mg Oral Q6HRS PRN Kike Darling M.D.       • pantoprazole (PROTONIX) injection 40 mg  40 mg Intravenous BID Kike Darling M.D.   40 mg at 02/07/20 0524   • fluticasone (FLOVENT HFA) 44 MCG/ACT inhaler 88 mcg  2 Puff Inhalation DAILY Kike Darling M.D.   88 mcg at 02/07/20 0524   • umeclidinium-vilanterol (ANORO ELLIPTA) inhaler 1 Puff  1 Puff Inhalation DAILY AT 1800 Evan Kennedy M.D.   1 Puff at 02/07/20 1109   • traZODone (DESYREL) tablet 100 mg  100 mg Oral QHS Keny Butler M.D.   100 mg at 02/06/20 2208   • oxyCODONE immediate-release (ROXICODONE) tablet 5 mg  5 mg Oral Q4HRS PRN Keny Butler M.D.             Assessment/Plan  79 y.o. male with ESRD on HD MWF who presented 2/6/2020 with fatigue and RUE swelling.     1.  ESRD on hemodialysis Monday Wednesday Friday.    Dialysis done today per usual schedule.  Patient is anuric.  Check labs daily.     2.  Access: Right upper arm AV fistula.  Avoid blood pressure checks, lab draws, and IVs in the right arm.     3.  Right upper extremity swelling, likely due to AV fistula infiltration at outpatient dialysis.  Monitor closely for signs of expanding hematoma or compartment syndrome.       4.  Acute blood loss anemia, likely due to hematoma in the right upper arm.  Improved with blood transfusions.  Check CBC daily.  EGD without active bleeding on 2/7/2020.     5.  Anemia of chronic disease.    Continue Epogen 3 times weekly.  Check CBC daily.     6.  Hyperkalemia, improved with dialysis.  Check labs daily.     7.  Metabolic acidosis, improved with dialysis.  Check labs daily.     8.  Hypercalcemia, improved with dialysis.  Check labs daily.     Prognosis guarded      Jerry Slater MD  Nephrology

## 2020-02-09 NOTE — PROGRESS NOTES
Critical Care Progress Note    Date of admission  2/6/2020    Chief Complaint  79 y.o. male admitted 2/6/2020 with fistula malfunction, GIB    Hospital Course    79 y.o. male who presented 2/6/2020 with Aortic stenosis that is being evaluated for a TAVR, afib, CAD s/p LAD stent on Tuesday, CHF, ckd on dialysis, COPD, hypertension that was transferred from [facility] for higher level of care. That recent had a fistulagram Patient describes he was placed on aspirin and plavix for a stent to his LAD on Tuesday of this week. and also takes coumadin for afib and recent TAVR that describes having multiple attempts without success that then developed swelling and bruising in his arm. He also noticed bruising at multiple other place at groin puncture site and notice dark black stools that thought it was related to having blue berries. He has had a prior GI bleed but doesn't remember. Patient describes feeling week and shortness of breath and chest tightness. Patient was afebrile and hr was 's SBP was 128 that dropped to 95/63   He was found to have a hg of 5.7 plt of 178, inr of 1.68 teg with 99% and 74% inhibition. I was asked to consult patient was on emergent dialysis and received 1 prbc. K was 6.2 co2 was 17 with repeat k of 5.7 after treatment. CXR with pulmonary edema and right side effusion. On multiple re-assessments patient color return hypotension improved while on dialysis and 3 units of PRBC and chest pain/tightiness resolved.       Interval Problem Update  EGD negative  Doing very well and would really like to go home      Review of Systems  Review of Systems   Respiratory: Negative for shortness of breath.    Cardiovascular: Negative for chest pain.   Gastrointestinal: Positive for blood in stool and melena.        Vital Signs for last 24 hours   Temp:  [36.1 °C (97 °F)-36.4 °C (97.6 °F)] 36.4 °C (97.6 °F)  Pulse:  [] 100  Resp:  [16-30] 18  BP: (113-142)/(49-73) 135/64  SpO2:  [93 %-99 %] 97  %    Hemodynamic parameters for last 24 hours       Respiratory Information for the last 24 hours       Physical Exam   Physical Exam  Constitutional:       Appearance: Normal appearance.   HENT:      Mouth/Throat:      Mouth: Mucous membranes are moist.   Eyes:      Pupils: Pupils are equal, round, and reactive to light.   Cardiovascular:      Rate and Rhythm: Normal rate. Rhythm irregular.      Heart sounds: Murmur present.   Pulmonary:      Effort: Pulmonary effort is normal.      Breath sounds: Normal breath sounds.   Abdominal:      General: Bowel sounds are normal.      Tenderness: There is no tenderness.   Musculoskeletal:      Right lower leg: No edema.      Left lower leg: No edema.   Skin:     Findings: Bruising (extensive ecchymosis over R arm) present.   Neurological:      Mental Status: He is alert and oriented to person, place, and time.   Psychiatric:         Mood and Affect: Mood normal.         Medications  No current facility-administered medications for this encounter.      Current Outpatient Medications   Medication Sig Dispense Refill   • tamsulosin (FLOMAX) 0.4 MG capsule Take 0.8 mg by mouth every day.     • albuterol 108 (90 Base) MCG/ACT Aero Soln inhalation aerosol Inhale 1-2 Puffs by mouth every four hours as needed for Shortness of Breath.     • aspirin (ASA) 81 MG Chew Tab chewable tablet Take 81 mg by mouth every day.     • clopidogrel (PLAVIX) 75 MG Tab Take 75 mg by mouth every day.     • ROPINIRole (REQUIP) 0.5 MG Tab TAKE TWO TABLETS BY MOUTH DAILY 180 Tab 2   • omeprazole (PRILOSEC) 40 MG delayed-release capsule TAKE ONE CAPSULE BY MOUTH DAILY 30 Cap 2   • Fluticasone-Umeclidin-Vilant (TRELEGY ELLIPTA) 100-62.5-25 MCG/INH AEROSOL POWDER, BREATH ACTIVATED Inhale 1 Inhaler by mouth every day. 1 Each 6   • montelukast (SINGULAIR) 10 MG Tab Take 1 Tab by mouth every evening. 90 Tab 3   • fluticasone (FLONASE) 50 MCG/ACT nasal spray Spray 1-2 Sprays in nose every day. Each nostril. 1  Bottle 6   • traZODone (DESYREL) 150 MG Tab Take 150-300 mg by mouth every bedtime.     • B Complex-C-Folic Acid (DIALYVITE TABLET) Tab TAKE ONE TABLET BY MOUTH DAILY 90 Tab 3   • Calcium Acetate, Phos Binder, 667 MG Cap TAKE 3 CAPSULES BY MOUTH WITH MEALS (3 MEALS) AND 2 CAPSULES WITH SNACKS (2 SNACKS) 420 Cap 11   • torsemide (DEMADEX) 10 MG tablet TAKE THREE TABLETS BY MOUTH DAILY 270 Tab 3   • levalbuterol (XOPENEX) 1.25 MG/3ML Nebu Soln 3 mL by Nebulization route every four hours as needed for Shortness of Breath. 120 Bullet 11   • pravastatin (PRAVACHOL) 20 MG Tab Take 20 mg by mouth every evening.         Fluids    Intake/Output Summary (Last 24 hours) at 2/8/2020 1840  Last data filed at 2/8/2020 0400  Gross per 24 hour   Intake 130 ml   Output 0 ml   Net 130 ml       Laboratory  Recent Labs     02/06/20  0841   KGEQC09U 7.46   [id number] 21.7*   [id number] 118.3*   JUJS5IVP 97.7   ARTHCO3 15*   ARTBE -8*         Recent Labs     02/06/20  0610 02/06/20  0821 02/06/20  1730 02/07/20 0527 02/08/20  0456   SODIUM 136 137  --  135 138   POTASSIUM 6.2* 5.7* 4.3 4.1 3.9   CHLORIDE 93* 95*  --  95* 96   CO2 17* 19*  --  28 29   * 150*  --  61* 55*   CREATININE 9.45* 9.31*  --  5.17* 5.64*   MAGNESIUM 1.7 1.8  --   --   --    PHOSPHORUS 3.4  --   --   --   --    CALCIUM 10.9* 10.7*  --  9.5 10.1     Recent Labs     02/06/20  0610 02/06/20  0821 02/07/20  0527 02/08/20  0456   ALTSGPT 15 15  --   --    ASTSGOT 17 13  --   --    ALKPHOSPHAT 93 80  --   --    TBILIRUBIN 0.3 0.5  --   --    GLUCOSE 97 114* 97 95     Recent Labs     02/06/20  0610 02/06/20  0821 02/07/20  0527 02/08/20  0456   WBC 5.5 8.6 4.0* 4.7*   NEUTSPOLYS 86.10* 80.90*  --  71.00   LYMPHOCYTES 6.90* 11.50*  --  14.30*   MONOCYTES 5.70 6.20  --  11.80   EOSINOPHILS 0.70 0.60  --  2.10   BASOPHILS 0.20 0.20  --  0.60   ASTSGOT 17 13  --   --    ALTSGPT 15 15  --   --    ALKPHOSPHAT 93 80  --   --    TBILIRUBIN 0.3 0.5  --   --      Recent Labs      02/06/20  0610 02/06/20  0821  02/07/20  0527 02/07/20  0800 02/07/20  1325 02/08/20  0038 02/08/20  0456   RBC 1.83* 1.97*  --  2.81*  --   --   --  3.36*   HEMOGLOBIN 5.7* 6.1*   < > 9.1*  --  8.5* 10.1* 10.5*   HEMATOCRIT 18.0* 20.0*   < > 26.0*  --  25.8* 30.8* 32.4*   PLATELETCT 178 234  --  154*  --   --   --  199   PROTHROMBTM 20.1*  --   --   --  16.9*  --   --   --    APTT 30.2  --   --   --   --   --   --   --    INR 1.68*  --   --   --  1.34*  --   --   --     < > = values in this interval not displayed.       Imaging  CT:    Reviewed    Assessment/Plan  Transudative pleural effusion- (present on admission)  Assessment & Plan  Continue to monitor need for drainage    Severe aortic stenosis- (present on admission)  Assessment & Plan  Severe AS EF 60% valve area 0.7 Vmax 4.2 mean 49  RVSP 50  Continue to maintain LV euvolemia and control heart rate for increase fitness of AV valve  Patient is currently being evaluated for TAVR    Coronary artery disease- (present on admission)  Assessment & Plan  Hx of CAD s/p stent last Tuesday   Continue plavix given recent stent    HELGA (obstructive sleep apnea)- (present on admission)  Assessment & Plan  Hx of continue for obstructive sleep apnea continue CPAP at home settings.     ESRD (end stage renal disease) ([facility])- (present on admission)  Assessment & Plan  Dialysis dependent nephrology consulting.     Hyperkalemia- (present on admission)  Assessment & Plan  S/p HD    Anemia- (present on admission)  Assessment & Plan  Acute GI bleed with aspirin, plavix and coumadin with acute LAD stent placed 10d  EGD negative  F/u with GI as OP    History of atrial flutter- (present on admission)  Assessment & Plan  Hold warfarin.  Discussed risks and benefits with patient, and plan to discharge him without warfarin.  He understands the complexity of this decision and is in agreement  H/o ablation  In sinus vs WAP. Does appear to have multiple p-wave morphologies on  tele      BPH (benign prostatic hypertrophy)- (present on admission)  Assessment & Plan  Hx of hold with current hypotension and monitor need       VTE:  Contraindicated  Ulcer: PPI  Lines: None    I have performed a physical exam and reviewed and updated ROS and Plan today (2/8/2020). In review of yesterday's note (2/7/2020), there are no changes except as documented above.     Discussed patient condition and risk of morbidity and/or mortality with Family, RN, RT, Charge nurse / hot rounds, Patient and GI

## 2020-02-13 ENCOUNTER — APPOINTMENT (OUTPATIENT)
Dept: PULMONOLOGY | Facility: HOSPICE | Age: 80
End: 2020-02-13
Payer: MEDICARE

## 2020-02-15 ENCOUNTER — HOSPITAL ENCOUNTER (EMERGENCY)
Facility: MEDICAL CENTER | Age: 80
DRG: 377 | End: 2020-02-15
Attending: EMERGENCY MEDICINE
Payer: MEDICARE

## 2020-02-15 VITALS
SYSTOLIC BLOOD PRESSURE: 146 MMHG | OXYGEN SATURATION: 97 % | BODY MASS INDEX: 24.06 KG/M2 | DIASTOLIC BLOOD PRESSURE: 63 MMHG | RESPIRATION RATE: 20 BRPM | HEIGHT: 68 IN | WEIGHT: 158.73 LBS | TEMPERATURE: 97.5 F | HEART RATE: 102 BPM

## 2020-02-15 DIAGNOSIS — D64.9 ANEMIA, UNSPECIFIED TYPE: ICD-10-CM

## 2020-02-15 DIAGNOSIS — N18.6 ESRD (END STAGE RENAL DISEASE) ON DIALYSIS (HCC): ICD-10-CM

## 2020-02-15 DIAGNOSIS — Z99.2 ESRD (END STAGE RENAL DISEASE) ON DIALYSIS (HCC): ICD-10-CM

## 2020-02-15 LAB
ANION GAP SERPL CALC-SCNC: 17 MMOL/L (ref 7–16)
ANISOCYTOSIS BLD QL SMEAR: ABNORMAL
BASOPHILS # BLD AUTO: 0.7 % (ref 0–1.8)
BASOPHILS # BLD: 0.03 K/UL (ref 0–0.12)
BUN SERPL-MCNC: 62 MG/DL (ref 8–22)
CALCIUM SERPL-MCNC: 10.2 MG/DL (ref 8.4–10.2)
CHLORIDE SERPL-SCNC: 95 MMOL/L (ref 96–112)
CO2 SERPL-SCNC: 27 MMOL/L (ref 20–33)
COMMENT 1642: NORMAL
CREAT SERPL-MCNC: 6.29 MG/DL (ref 0.5–1.4)
EOSINOPHIL # BLD AUTO: 0.16 K/UL (ref 0–0.51)
EOSINOPHIL NFR BLD: 3.6 % (ref 0–6.9)
ERYTHROCYTE [DISTWIDTH] IN BLOOD BY AUTOMATED COUNT: 65.3 FL (ref 35.9–50)
GLUCOSE SERPL-MCNC: 102 MG/DL (ref 65–99)
HCT VFR BLD AUTO: 25.3 % (ref 42–52)
HGB BLD-MCNC: 7.7 G/DL (ref 14–18)
IMM GRANULOCYTES # BLD AUTO: 0.02 K/UL (ref 0–0.11)
IMM GRANULOCYTES NFR BLD AUTO: 0.4 % (ref 0–0.9)
LYMPHOCYTES # BLD AUTO: 0.58 K/UL (ref 1–4.8)
LYMPHOCYTES NFR BLD: 13 % (ref 22–41)
MACROCYTES BLD QL SMEAR: ABNORMAL
MCH RBC QN AUTO: 31.6 PG (ref 27–33)
MCHC RBC AUTO-ENTMCNC: 30.4 G/DL (ref 33.7–35.3)
MCV RBC AUTO: 103.7 FL (ref 81.4–97.8)
MONOCYTES # BLD AUTO: 0.46 K/UL (ref 0–0.85)
MONOCYTES NFR BLD AUTO: 10.3 % (ref 0–13.4)
NEUTROPHILS # BLD AUTO: 3.21 K/UL (ref 1.82–7.42)
NEUTROPHILS NFR BLD: 72 % (ref 44–72)
NRBC # BLD AUTO: 0 K/UL
NRBC BLD-RTO: 0 /100 WBC
PLATELET # BLD AUTO: 244 K/UL (ref 164–446)
PMV BLD AUTO: 10.1 FL (ref 9–12.9)
POTASSIUM SERPL-SCNC: 4.5 MMOL/L (ref 3.6–5.5)
RBC # BLD AUTO: 2.44 M/UL (ref 4.7–6.1)
RBC BLD AUTO: PRESENT
SODIUM SERPL-SCNC: 139 MMOL/L (ref 135–145)
WBC # BLD AUTO: 4.5 K/UL (ref 4.8–10.8)

## 2020-02-15 PROCEDURE — 36415 COLL VENOUS BLD VENIPUNCTURE: CPT

## 2020-02-15 PROCEDURE — 99284 EMERGENCY DEPT VISIT MOD MDM: CPT

## 2020-02-15 PROCEDURE — 80048 BASIC METABOLIC PNL TOTAL CA: CPT

## 2020-02-15 PROCEDURE — 85025 COMPLETE CBC W/AUTO DIFF WBC: CPT

## 2020-02-15 NOTE — ED PROVIDER NOTES
"ED Provider Note    CHIEF COMPLAINT  Chief Complaint   Patient presents with   • Abnormal Labs        HPI  Parmjit Castelan is a 79 y.o. male who presents to the ED with complaints of abnormal labs.  The patient has a history of renal failure undergoing chronic dialysis 3 days a week.  The patient was seen in the hospital a week ago because of severe anemia he did get 3 units blood transfusion and had an upper endoscopy done by [facility].  Patient has been doing well actually feels well but apparently he got a lab test this week that told them that his hemoglobin was 7.5 and was told to have that reevaluated.  The patient feels otherwise well the family was concerned so they present to the emerge department for evaluation.    REVIEW OF SYSTEMS  See HPI for further details. All other systems are negative.     PAST MEDICAL HISTORY  Past Medical History:   Diagnosis Date   • Anesthesia     \"needs more sedation\" (can sometimes hear MD during procedure)    • Anticoagulation monitoring, special range    • Aortic stenosis     mod AS- concern for low flow severe AS with rEFof 30%   • Arterial leg ulcer ([facility]) 11/8/2018   • Atrial flutter ([facility]) 6/12/2019   • Basal cell carcinoma of left cheek 3/26/2015   • BPH 7/14/2009   • Bronchitis 12/25/2018   • CAD (coronary artery disease) 2/20/2014   • CATARACT     sanjuanita surgery complete   • CHF (congestive heart failure), NYHA class II, chronic, systolic ([facility]) E F30 in setting of atrial flutter 6/13/2019   • Chronic respiratory failure with hypoxia ([facility]) 5/8/2016   • CKD (chronic kidney disease) stage 4, GFR 15-29 ml/min ([facility]) 1/15/2010    end stage renal disease   • COPD (chronic obstructive pulmonary disease) ([facility])     wears 2.5 L o2 sometimes when he sleeps   • Detached retina    • Dialysis     m,w,f [place]   • EMPHYSEMA    • Glaucoma     Early stage   • Gout    • Heart burn     occas   • Heart murmur     maria esther lee cardiologist   • Hypertension    • Indigestion "     occas   • Kidney cyst    • Leg pain, bilateral 8/10/2015   • On supplemental oxygen therapy    • Peripheral vascular disease (HCC) 8/10/2015   • Pneumonia    • Primary insomnia 3/22/2018   • Proteinuria    • PVD (peripheral vascular disease) ([facility])    • Sleep apnea     O2 PER CANULA  AT NIGHT 2l/m       FAMILY HISTORY  Family History   Problem Relation Age of Onset   • Stroke Mother    • Hypertension Mother    • Lung Disease Father         Emphysema, resp failure   • Genitourinary () Problems Maternal Aunt         hematuria   • Hypertension Brother        SOCIAL HISTORY  Social History     Socioeconomic History   • Marital status:      Spouse name: Not on file   • Number of children: Not on file   • Years of education: Not on file   • Highest education level: Not on file   Occupational History   • Not on file   Social Needs   • Financial resource strain: Not on file   • Food insecurity     Worry: Not on file     Inability: Not on file   • Transportation needs     Medical: Not on file     Non-medical: Not on file   Tobacco Use   • Smoking status: Former Smoker     Packs/day: 1.00     Years: 40.00     Pack years: 40.00     Types: Cigarettes     Last attempt to quit: 2009     Years since quittin.1   • Smokeless tobacco: Never Used   • Tobacco comment: 1 pk a day for 35 yrs, QUIT 2010   Substance and Sexual Activity   • Alcohol use: No     Alcohol/week: 0.0 oz   • Drug use: No   • Sexual activity: Never     Partners: Female     Comment: , two sons, 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   Lifestyle   • Physical activity     Days per week: Not on file     Minutes per session: Not on file   • Stress: Not on file   Relationships   • Social connections     Talks on phone: Not on file     Gets together: Not on file     Attends [employer] service: Not on file     Active member of club or organization: Not on file     Attends meetings of clubs or organizations: Not on file     Relationship status: Not on  file   • Intimate partner violence     Fear of current or ex partner: Not on file     Emotionally abused: Not on file     Physically abused: Not on file     Forced sexual activity: Not on file   Other Topics Concern   • Not on file   Social History Narrative   • Not on file      Martha Light M.D.      SURGICAL HISTORY  Past Surgical History:   Procedure Laterality Date   • GASTROSCOPY-ENDO  2/7/2020    Procedure: GASTROSCOPY;  Surgeon: Jong Carrasquillo M.D.;  Location: ENDOSCOPY [facility];  Service: Gastroenterology   • STENT PLACEMENT  01/26/2020    lda   • AV FISTULA CREATION Right 8/6/2019    Procedure: CREATION, AV FISTULA -  RIGHT ARM  ,  and Ligation of Left Arm Fistula;  Surgeon: Sera Peters M.D.;  Location: SURGERY [facility];  Service: General   • CATH PLACEMENT Right 8/6/2019    Procedure: INSERTION, CATHETER - PERMA ,;  Surgeon: Sera Peters M.D.;  Location: SURGERY [facility];  Service: General   • JUSTIN  6/13/2019    Procedure: ECHOCARDIOGRAM, TRANSESOPHAGEAL;  Surgeon: Harvinder Hoang M.D.;  Location: SURGERY [facility];  Service: Cardiac   • CARDIOVERSION  6/13/2019    Procedure: CARDIOVERSION;  Surgeon: Harvinder Hoang M.D.;  Location: SURGERY [facility];  Service: Cardiac   • AV FISTULA CREATION Right 2/21/2019    Procedure: AV FISTULA CREATION - ARM;  Surgeon: David Cason M.D.;  Location: SURGERY [facility];  Service: General   • FEMORAL ENDARTERECTOMY Left 1/25/2019    Procedure: FEMORAL ENDARTERECTOMY;  Surgeon: David Cason M.D.;  Location: SURGERY [facility];  Service: General   • FEMORAL POPLITEAL BYPASS Left 1/25/2019    Procedure: LEFT FEMORAL POPLITEAL POLYTETRAFLUOROETHYLENE ePTFE(PROPATEN VASCULAR GRAFT) BYPASS;  Surgeon: David Cason M.D.;  Location: SURGERY [facility];  Service: General   • ANGIOGRAM Left 1/25/2019    Procedure: LEFT LEG ANGIOGRAM;  Surgeon: David Cason M.D.;  Location: SURGERY  [facility];  Service: General   • ENDOSCOPY PROCEDURE  3/21/2018    Procedure: ENDOSCOPY PROCEDURE/UPPER;  Surgeon: Enrique Child D.O.;  Location: SURGERY [facility];  Service: Gastroenterology   • COLONOSCOPY - ENDO  8/15/2016    Procedure: COLONOSCOPY - ENDO;  Surgeon: Mane Whatley M.D.;  Location: ENDOSCOPY [facility];  Service:    • GASTROSCOPY WITH BIOPSY  8/13/2016    Procedure: GASTROSCOPY WITH BIOPSY;  Surgeon: Jorge Leavitt M.D.;  Location: ENDOSCOPY [facility];  Service:    • RECOVERY  12/23/2015    Procedure: VASCULAR CASE-CASON-LEFT ARM FISTULOGRAM WITH ANGIOPLASTY;  Surgeon: Recoveryonly Surgery;  Location: SURGERY PRE-POST PROC UNIT [facility];  Service:    • RECOVERY  3/24/2015    Performed by Recoveryonly Surgery at SURGERY PRE-POST PROC UNIT [facility]   • RECOVERY  7/29/2014    Performed by Ir-Recovery Surgery at SURGERY SAME DAY [facility]   • RECOVERY  3/24/2014    Performed by Ir-Recovery Surgery at [facility]   • RECOVERY  12/17/2013    Performed by Ir-Recovery Surgery at SURGERY SAME DAY [facility]   • RECOVERY  7/2/2013    Performed by Ir-Recovery Surgery at SURGERY SAME DAY [facility]   • AV FISTULA THROMBOLYSIS  7/2/2013    Performed by David Cason M.D. at SURGERY [facility]   • RECOVERY  1/29/2013    Performed by Ir-Recovery Surgery at SURGERY SAME DAY [facility]   • RECOVERY  7/23/2012    Performed by SURGERY, IR-RECOVERY at SURGERY SAME DAY [facility]   • VITRECTOMY POSTERIOR  10/11/2011    Performed by NAHUM GE at SURGERY SAME DAY [facility]   • RECOVERY  8/12/2011    Performed by SURGERY, IR-RECOVERY at SURGERY SAME DAY [facility]   • VITRECTOMY POSTERIOR  1/18/2011    Performed by NAHUM GE at SURGERY SAME DAY [facility]   • SCLERAL BUCKLING  1/18/2011    Performed by NAHUM GE at SURGERY SAME DAY [facility]   • AV FISTULOGRAM  9/17/2010    Performed by DAVID CASON at SURGERY [facility]   •  ANGIOPLASTY BALLOON  9/17/2010    Performed by ALESHIA MOSCOSO at SURGERY [facility]   • AV FISTULOGRAM  7/23/2010    Performed by ALESHIA MOSCOSO at SURGERY [facility]   • ANGIOPLASTY BALLOON  7/23/2010    Performed by ALESHIA MOSCOSO at SURGERY [facility]   • AV FISTULA REVISION  2/19/2010    Performed by WILLIE HATCH at SURGERY [facility]   • AV FISTULA CREATION  2/12/2010    Performed by ALESHIA MOSCOSO at SURGERY [facility]   • CATARACT PHACO WITH IOL  4/8/08    Performed by STACEY PHILLIPS at SURGERY SAME DAY [facility]   • OTHER ORTHOPEDIC SURGERY  1998    right toe for facititis       CURRENT MEDICATIONS  Home Medications     Reviewed by Mee Pittman (Pharmacy Tech) on 02/15/20 at 1313  Med List Status: Complete   Medication Last Dose Status   albuterol 108 (90 Base) MCG/ACT Aero Soln inhalation aerosol PRN Active   aspirin (ASA) 81 MG Chew Tab chewable tablet 2/14/2020 Active   B Complex-C-Folic Acid (DIALYVITE TABLET) Tab 2/14/2020 Active   Calcium Acetate, Phos Binder, 667 MG Cap 2/15/2020 Active   clopidogrel (PLAVIX) 75 MG Tab 2/14/2020 Active   fluticasone (FLONASE) 50 MCG/ACT nasal spray 2/15/2020 Active   Fluticasone-Umeclidin-Vilant (TRELEGY ELLIPTA) 100-62.5-25 MCG/INH AEROSOL POWDER, BREATH ACTIVATED 2/15/2020 Active   levalbuterol (XOPENEX) 1.25 MG/3ML Nebu Soln PRN Active   montelukast (SINGULAIR) 10 MG Tab 2/14/2020 Active   omeprazole (PRILOSEC) 40 MG delayed-release capsule 2/14/2020 Active   pravastatin (PRAVACHOL) 20 MG Tab 2/14/2020 Active   ROPINIRole (REQUIP) 0.5 MG Tab 2/14/2020 Active   tamsulosin (FLOMAX) 0.4 MG capsule 2/14/2020 Active   torsemide (DEMADEX) 10 MG tablet 2/14/2020 Active   traZODone (DESYREL) 150 MG Tab 2/14/2020 Active              No current facility-administered medications on file prior to encounter.      Current Outpatient Medications on File Prior to Encounter   Medication Sig Dispense Refill   • aspirin (ASA)  "81 MG Chew Tab chewable tablet Take 81 mg by mouth every day.     • clopidogrel (PLAVIX) 75 MG Tab Take 75 mg by mouth every day.     • tamsulosin (FLOMAX) 0.4 MG capsule Take 0.8 mg by mouth every day.     • albuterol 108 (90 Base) MCG/ACT Aero Soln inhalation aerosol Inhale 1-2 Puffs by mouth every four hours as needed for Shortness of Breath.     • ROPINIRole (REQUIP) 0.5 MG Tab TAKE TWO TABLETS BY MOUTH DAILY 180 Tab 2   • omeprazole (PRILOSEC) 40 MG delayed-release capsule TAKE ONE CAPSULE BY MOUTH DAILY 30 Cap 2   • Fluticasone-Umeclidin-Vilant (TRELEGY ELLIPTA) 100-62.5-25 MCG/INH AEROSOL POWDER, BREATH ACTIVATED Inhale 1 Inhaler by mouth every day. 1 Each 6   • montelukast (SINGULAIR) 10 MG Tab Take 1 Tab by mouth every evening. 90 Tab 3   • fluticasone (FLONASE) 50 MCG/ACT nasal spray Spray 1-2 Sprays in nose every day. Each nostril. 1 Bottle 6   • traZODone (DESYREL) 150 MG Tab Take 150-300 mg by mouth every bedtime.     • B Complex-C-Folic Acid (DIALYVITE TABLET) Tab TAKE ONE TABLET BY MOUTH DAILY 90 Tab 3   • Calcium Acetate, Phos Binder, 667 MG Cap TAKE 3 CAPSULES BY MOUTH WITH MEALS (3 MEALS) AND 2 CAPSULES WITH SNACKS (2 SNACKS) 420 Cap 11   • torsemide (DEMADEX) 10 MG tablet TAKE THREE TABLETS BY MOUTH DAILY 270 Tab 3   • levalbuterol (XOPENEX) 1.25 MG/3ML Nebu Soln 3 mL by Nebulization route every four hours as needed for Shortness of Breath. 120 Bullet 11   • pravastatin (PRAVACHOL) 20 MG Tab Take 20 mg by mouth every evening.           ALLERGIES  No Known Allergies    PHYSICAL EXAM  VITAL SIGNS: /63   Pulse (!) 102   Temp 36.4 °C (97.5 °F) (Temporal)   Resp 20   Ht 1.727 m (5' 8\")   Wt 72 kg (158 lb 11.7 oz)   SpO2 97%   BMI 24.14 kg/m²    Pulse Oximetry was obtained. It showed a reading of 99%.  I interpreted this as non-hypoxic.     Constitutional: Well developed, Well nourished, No acute distress, Non-toxic appearance.   HENT: Normocephalic, Atraumatic, Bilateral external ears " normal, bilateral tympanic membranes normal, Oropharynx moist mucous membranes, No oral exudates, Nose normal.   Eyes:  conjunctiva is slightly pale, there are no signs of exudate.   Neck: Supple, no cervical lymphadenopathy, no meningeal signs..   Lymphatic: No lymphadenopathy noted.   Cardiovascular: Regular rate and rhythm with a grade 3/6 murmur no gallops or rubs.   Thorax & Lungs: Lungs are clear to auscultation bilaterally, there are no wheezes no rales. Chest wall is nontender.  Abdomen: Soft, nontender nondistended. Bowel sounds are present.   Skin: Warm, Dry, No erythema,   Back: No tenderness, No CVA tenderness.  Musculoskeletal: Good range of motion in all major joints. No tenderness to palpation or major deformities noted. Intact distal pulses, no clubbing, no cyanosis, no edema     Neurologic: Alert & oriented x 3, Normal motor function, Normal sensory function, No focal deficits noted.   Psychiatric: Affect normal, Judgment normal, Mood normal.         RADIOLOGY/PROCEDURES  No orders to display       Results for orders placed or performed during the hospital encounter of 02/15/20   CBC WITH DIFFERENTIAL   Result Value Ref Range    WBC 4.5 (L) 4.8 - 10.8 K/uL    RBC 2.44 (L) 4.70 - 6.10 M/uL    Hemoglobin 7.7 (L) 14.0 - 18.0 g/dL    Hematocrit 25.3 (L) 42.0 - 52.0 %    .7 (H) 81.4 - 97.8 fL    MCH 31.6 27.0 - 33.0 pg    MCHC 30.4 (L) 33.7 - 35.3 g/dL    RDW 65.3 (H) 35.9 - 50.0 fL    Platelet Count 244 164 - 446 K/uL    MPV 10.1 9.0 - 12.9 fL    Neutrophils-Polys 72.00 44.00 - 72.00 %    Lymphocytes 13.00 (L) 22.00 - 41.00 %    Monocytes 10.30 0.00 - 13.40 %    Eosinophils 3.60 0.00 - 6.90 %    Basophils 0.70 0.00 - 1.80 %    Immature Granulocytes 0.40 0.00 - 0.90 %    Nucleated RBC 0.00 /100 WBC    Neutrophils (Absolute) 3.21 1.82 - 7.42 K/uL    Lymphs (Absolute) 0.58 (L) 1.00 - 4.80 K/uL    Monos (Absolute) 0.46 0.00 - 0.85 K/uL    Eos (Absolute) 0.16 0.00 - 0.51 K/uL    Baso (Absolute) 0.03  0.00 - 0.12 K/uL    Immature Granulocytes (abs) 0.02 0.00 - 0.11 K/uL    NRBC (Absolute) 0.00 K/uL    Anisocytosis 1+     Macrocytosis 1+    BMP   Result Value Ref Range    Sodium 139 135 - 145 mmol/L    Potassium 4.5 3.6 - 5.5 mmol/L    Chloride 95 (L) 96 - 112 mmol/L    Co2 27 20 - 33 mmol/L    Glucose 102 (H) 65 - 99 mg/dL    Bun 62 (H) 8 - 22 mg/dL    Creatinine 6.29 (HH) 0.50 - 1.40 mg/dL    Calcium 10.2 8.4 - 10.2 mg/dL    Anion Gap 17.0 (H) 7.0 - 16.0   ESTIMATED GFR   Result Value Ref Range    GFR If African American 10 (A) >60 mL/min/1.73 m 2    GFR If Non African American 9 (A) >60 mL/min/1.73 m 2   MORPHOLOGY   Result Value Ref Range    RBC Morphology Present    DIFFERENTIAL COMMENT   Result Value Ref Range    Comments-Diff see below          COURSE & MEDICAL DECISION MAKING  Pertinent Labs & Imaging studies reviewed. (See chart for details)  Patient presents emerge department for evaluation.  Clinically the patient otherwise appears well.  Laboratory studies show a hemoglobin of 7.7 which is just a little bit above the 7.5 that was taken on the 12th.  At this point I feel the patient is stable for discharge.  The patient is to follow-up with his outpatient gastroenterologist as well as his nephrologist.    FINAL IMPRESSION  1. Anemia, unspecified type     2. ESRD (end stage renal disease) on dialysis (HCC)           The patient will return for new or worsening symptoms and is stable at the time of discharge.    The patient is referred to a primary physician for blood pressure management, diabetic screening, and for all other preventative health concerns.        DISPOSITION:  Patient will be discharged home in stable condition.    FOLLOW UP:  Martha Light M.D.  [id number]  [address]  [facility] [id number] [phone]    Schedule an appointment as soon as possible for a visit   For re-check    [facility]  [address] [place] [address]  [id number]  [phone]          OUTPATIENT MEDICATIONS:  Discharge Medication List as of 2/15/2020  3:20 PM                  Electronically signed by: Bryan Arias M.D., 2/15/2020 1:03 PM

## 2020-02-15 NOTE — ED NOTES
Med Rec completed per patient, wife, and med list (returned)   Allergies reviewed  No ORAL antibiotics in last 14 days

## 2020-02-15 NOTE — ED TRIAGE NOTES
"Pt is referred to our facility by his nephrologist; he is accompanied by family. He C/O recurrent anemia.  He reports a medical history significant  For CKD.   Chief Complaint   Patient presents with   • Abnormal Labs     /53   Pulse 88   Temp 36.4 °C (97.5 °F) (Temporal)   Resp 20   Ht 1.727 m (5' 8\")   Wt 72 kg (158 lb 11.7 oz)   BMI 24.14 kg/m²     "

## 2020-02-15 NOTE — ED NOTES
Patient updated on care at this time. Pt in no apparent distress, call light within reach. Pt has no further questions or concerns at the moment.

## 2020-02-15 NOTE — ED NOTES
Initial RN assessment completed. Blood work drawn, labeled and sent to lab at this time. Call light within patient's reach.

## 2020-02-17 ENCOUNTER — HOSPITAL ENCOUNTER (INPATIENT)
Facility: MEDICAL CENTER | Age: 80
LOS: 2 days | DRG: 377 | End: 2020-02-19
Attending: EMERGENCY MEDICINE | Admitting: INTERNAL MEDICINE
Payer: MEDICARE

## 2020-02-17 ENCOUNTER — HOSPITAL ENCOUNTER (OUTPATIENT)
Dept: LAB | Facility: MEDICAL CENTER | Age: 80
DRG: 377 | End: 2020-02-17
Attending: INTERNAL MEDICINE
Payer: MEDICARE

## 2020-02-17 PROBLEM — R21 RASH: Status: RESOLVED | Noted: 2019-03-07 | Resolved: 2020-02-17

## 2020-02-17 PROBLEM — D62 ACUTE ON CHRONIC BLOOD LOSS ANEMIA: Status: ACTIVE | Noted: 2020-02-06

## 2020-02-17 PROBLEM — J96.01 ACUTE HYPOXEMIC RESPIRATORY FAILURE (HCC): Status: RESOLVED | Noted: 2019-10-24 | Resolved: 2020-02-17

## 2020-02-17 LAB
ABO GROUP BLD: NORMAL
ALBUMIN SERPL BCP-MCNC: 3.7 G/DL (ref 3.2–4.9)
ALBUMIN/GLOB SERPL: 1.5 G/DL
ALP SERPL-CCNC: 103 U/L (ref 30–99)
ALT SERPL-CCNC: 12 U/L (ref 2–50)
ANION GAP SERPL CALC-SCNC: 20 MMOL/L (ref 7–16)
ANISOCYTOSIS BLD QL SMEAR: ABNORMAL
AST SERPL-CCNC: 13 U/L (ref 12–45)
BARCODED ABORH UBTYP: 5100
BARCODED ABORH UBTYP: 5100
BARCODED PRD CODE UBPRD: NORMAL
BARCODED PRD CODE UBPRD: NORMAL
BARCODED UNIT NUM UBUNT: NORMAL
BARCODED UNIT NUM UBUNT: NORMAL
BASOPHILS # BLD AUTO: 0.3 % (ref 0–1.8)
BASOPHILS # BLD AUTO: 0.4 % (ref 0–1.8)
BASOPHILS # BLD: 0.02 K/UL (ref 0–0.12)
BASOPHILS # BLD: 0.02 K/UL (ref 0–0.12)
BILIRUB SERPL-MCNC: 0.4 MG/DL (ref 0.1–1.5)
BLD GP AB SCN SERPL QL: NORMAL
BUN SERPL-MCNC: 110 MG/DL (ref 8–22)
CALCIUM SERPL-MCNC: 10.4 MG/DL (ref 8.4–10.2)
CHLORIDE SERPL-SCNC: 92 MMOL/L (ref 96–112)
CO2 SERPL-SCNC: 22 MMOL/L (ref 20–33)
COMMENT 1642: NORMAL
COMPONENT R 8504R: NORMAL
COMPONENT R 8504R: NORMAL
CREAT SERPL-MCNC: 9.41 MG/DL (ref 0.5–1.4)
EKG IMPRESSION: NORMAL
EOSINOPHIL # BLD AUTO: 0.14 K/UL (ref 0–0.51)
EOSINOPHIL # BLD AUTO: 0.17 K/UL (ref 0–0.51)
EOSINOPHIL NFR BLD: 2.3 % (ref 0–6.9)
EOSINOPHIL NFR BLD: 3 % (ref 0–6.9)
ERYTHROCYTE [DISTWIDTH] IN BLOOD BY AUTOMATED COUNT: 65.2 FL (ref 35.9–50)
ERYTHROCYTE [DISTWIDTH] IN BLOOD BY AUTOMATED COUNT: 65.3 FL (ref 35.9–50)
FERRITIN SERPL-MCNC: 208 NG/ML (ref 22–322)
GIANT PLATELETS BLD QL SMEAR: NORMAL
GLOBULIN SER CALC-MCNC: 2.5 G/DL (ref 1.9–3.5)
GLUCOSE SERPL-MCNC: 110 MG/DL (ref 65–99)
HCT VFR BLD AUTO: 18 % (ref 42–52)
HCT VFR BLD AUTO: 18.4 % (ref 42–52)
HGB BLD-MCNC: 5.5 G/DL (ref 14–18)
HGB BLD-MCNC: 5.6 G/DL (ref 14–18)
IMM GRANULOCYTES # BLD AUTO: 0.02 K/UL (ref 0–0.11)
IMM GRANULOCYTES # BLD AUTO: 0.03 K/UL (ref 0–0.11)
IMM GRANULOCYTES NFR BLD AUTO: 0.3 % (ref 0–0.9)
IMM GRANULOCYTES NFR BLD AUTO: 0.5 % (ref 0–0.9)
IRON SATN MFR SERPL: 19 % (ref 15–55)
IRON SERPL-MCNC: 59 UG/DL (ref 50–180)
LYMPHOCYTES # BLD AUTO: 0.44 K/UL (ref 1–4.8)
LYMPHOCYTES # BLD AUTO: 0.55 K/UL (ref 1–4.8)
LYMPHOCYTES NFR BLD: 7.9 % (ref 22–41)
LYMPHOCYTES NFR BLD: 9.1 % (ref 22–41)
MACROCYTES BLD QL SMEAR: ABNORMAL
MCH RBC QN AUTO: 31.6 PG (ref 27–33)
MCH RBC QN AUTO: 32 PG (ref 27–33)
MCHC RBC AUTO-ENTMCNC: 30.4 G/DL (ref 33.7–35.3)
MCHC RBC AUTO-ENTMCNC: 30.5 G/DL (ref 33.7–35.3)
MCV RBC AUTO: 104 FL (ref 81.4–97.8)
MCV RBC AUTO: 104.7 FL (ref 81.4–97.8)
MONOCYTES # BLD AUTO: 0.37 K/UL (ref 0–0.85)
MONOCYTES # BLD AUTO: 0.48 K/UL (ref 0–0.85)
MONOCYTES NFR BLD AUTO: 6.6 % (ref 0–13.4)
MONOCYTES NFR BLD AUTO: 7.9 % (ref 0–13.4)
NEUTROPHILS # BLD AUTO: 4.57 K/UL (ref 1.82–7.42)
NEUTROPHILS # BLD AUTO: 4.85 K/UL (ref 1.82–7.42)
NEUTROPHILS NFR BLD: 80.1 % (ref 44–72)
NEUTROPHILS NFR BLD: 81.6 % (ref 44–72)
NRBC # BLD AUTO: 0 K/UL
NRBC # BLD AUTO: 0 K/UL
NRBC BLD-RTO: 0 /100 WBC
NRBC BLD-RTO: 0 /100 WBC
PLATELET # BLD AUTO: 222 K/UL (ref 164–446)
PLATELET # BLD AUTO: 253 K/UL (ref 164–446)
PLATELET BLD QL SMEAR: NORMAL
PMV BLD AUTO: 10.3 FL (ref 9–12.9)
PMV BLD AUTO: 9.7 FL (ref 9–12.9)
POLYCHROMASIA BLD QL SMEAR: NORMAL
POTASSIUM SERPL-SCNC: 5.3 MMOL/L (ref 3.6–5.5)
PRODUCT TYPE UPROD: NORMAL
PRODUCT TYPE UPROD: NORMAL
PROT SERPL-MCNC: 6.2 G/DL (ref 6–8.2)
RBC # BLD AUTO: 1.69 M/UL (ref 4.7–6.1)
RBC # BLD AUTO: 1.77 M/UL (ref 4.7–6.1)
RBC BLD AUTO: PRESENT
RH BLD: NORMAL
SODIUM SERPL-SCNC: 134 MMOL/L (ref 135–145)
TIBC SERPL-MCNC: 312 UG/DL (ref 250–450)
TROPONIN T SERPL-MCNC: 554 NG/L (ref 6–19)
TROPONIN T SERPL-MCNC: 614 NG/L (ref 6–19)
UNIT STATUS USTAT: NORMAL
UNIT STATUS USTAT: NORMAL
WBC # BLD AUTO: 5.6 K/UL (ref 4.8–10.8)
WBC # BLD AUTO: 6.1 K/UL (ref 4.8–10.8)

## 2020-02-17 PROCEDURE — 82728 ASSAY OF FERRITIN: CPT

## 2020-02-17 PROCEDURE — A9270 NON-COVERED ITEM OR SERVICE: HCPCS | Performed by: INTERNAL MEDICINE

## 2020-02-17 PROCEDURE — 30233N1 TRANSFUSION OF NONAUTOLOGOUS RED BLOOD CELLS INTO PERIPHERAL VEIN, PERCUTANEOUS APPROACH: ICD-10-PCS | Performed by: INTERNAL MEDICINE

## 2020-02-17 PROCEDURE — 700101 HCHG RX REV CODE 250: Performed by: INTERNAL MEDICINE

## 2020-02-17 PROCEDURE — 83550 IRON BINDING TEST: CPT

## 2020-02-17 PROCEDURE — 86901 BLOOD TYPING SEROLOGIC RH(D): CPT

## 2020-02-17 PROCEDURE — 93005 ELECTROCARDIOGRAM TRACING: CPT | Performed by: EMERGENCY MEDICINE

## 2020-02-17 PROCEDURE — 700111 HCHG RX REV CODE 636 W/ 250 OVERRIDE (IP): Performed by: EMERGENCY MEDICINE

## 2020-02-17 PROCEDURE — 700111 HCHG RX REV CODE 636 W/ 250 OVERRIDE (IP): Performed by: INTERNAL MEDICINE

## 2020-02-17 PROCEDURE — 84484 ASSAY OF TROPONIN QUANT: CPT

## 2020-02-17 PROCEDURE — 36430 TRANSFUSION BLD/BLD COMPNT: CPT

## 2020-02-17 PROCEDURE — 86923 COMPATIBILITY TEST ELECTRIC: CPT

## 2020-02-17 PROCEDURE — 83540 ASSAY OF IRON: CPT

## 2020-02-17 PROCEDURE — 770020 HCHG ROOM/CARE - TELE (206)

## 2020-02-17 PROCEDURE — C9113 INJ PANTOPRAZOLE SODIUM, VIA: HCPCS | Performed by: EMERGENCY MEDICINE

## 2020-02-17 PROCEDURE — 99285 EMERGENCY DEPT VISIT HI MDM: CPT

## 2020-02-17 PROCEDURE — P9016 RBC LEUKOCYTES REDUCED: HCPCS | Mod: 91

## 2020-02-17 PROCEDURE — 700102 HCHG RX REV CODE 250 W/ 637 OVERRIDE(OP): Performed by: INTERNAL MEDICINE

## 2020-02-17 PROCEDURE — 94760 N-INVAS EAR/PLS OXIMETRY 1: CPT

## 2020-02-17 PROCEDURE — 85025 COMPLETE CBC W/AUTO DIFF WBC: CPT

## 2020-02-17 PROCEDURE — 96374 THER/PROPH/DIAG INJ IV PUSH: CPT

## 2020-02-17 PROCEDURE — 99223 1ST HOSP IP/OBS HIGH 75: CPT | Performed by: INTERNAL MEDICINE

## 2020-02-17 PROCEDURE — C9113 INJ PANTOPRAZOLE SODIUM, VIA: HCPCS | Performed by: INTERNAL MEDICINE

## 2020-02-17 PROCEDURE — 80053 COMPREHEN METABOLIC PANEL: CPT

## 2020-02-17 PROCEDURE — 86850 RBC ANTIBODY SCREEN: CPT

## 2020-02-17 PROCEDURE — 36415 COLL VENOUS BLD VENIPUNCTURE: CPT

## 2020-02-17 PROCEDURE — 85025 COMPLETE CBC W/AUTO DIFF WBC: CPT | Mod: 91

## 2020-02-17 PROCEDURE — 94640 AIRWAY INHALATION TREATMENT: CPT

## 2020-02-17 PROCEDURE — 86900 BLOOD TYPING SEROLOGIC ABO: CPT

## 2020-02-17 PROCEDURE — 99223 1ST HOSP IP/OBS HIGH 75: CPT | Mod: AI | Performed by: INTERNAL MEDICINE

## 2020-02-17 RX ORDER — ROSUVASTATIN CALCIUM 10 MG/1
40 TABLET, COATED ORAL EVERY EVENING
Status: DISCONTINUED | OUTPATIENT
Start: 2020-02-17 | End: 2020-02-19 | Stop reason: HOSPADM

## 2020-02-17 RX ORDER — LEVALBUTEROL INHALATION SOLUTION 1.25 MG/3ML
1.25 SOLUTION RESPIRATORY (INHALATION) EVERY 4 HOURS PRN
Status: DISCONTINUED | OUTPATIENT
Start: 2020-02-17 | End: 2020-02-19 | Stop reason: HOSPADM

## 2020-02-17 RX ORDER — PANTOPRAZOLE SODIUM 40 MG/10ML
40 INJECTION, POWDER, LYOPHILIZED, FOR SOLUTION INTRAVENOUS 2 TIMES DAILY
Status: DISCONTINUED | OUTPATIENT
Start: 2020-02-17 | End: 2020-02-19 | Stop reason: HOSPADM

## 2020-02-17 RX ORDER — TRAZODONE HYDROCHLORIDE 50 MG/1
150-300 TABLET ORAL
Status: DISCONTINUED | OUTPATIENT
Start: 2020-02-17 | End: 2020-02-19 | Stop reason: HOSPADM

## 2020-02-17 RX ORDER — ASPIRIN 81 MG/1
81 TABLET, CHEWABLE ORAL DAILY
Status: DISCONTINUED | OUTPATIENT
Start: 2020-02-17 | End: 2020-02-19 | Stop reason: HOSPADM

## 2020-02-17 RX ORDER — PANTOPRAZOLE SODIUM 40 MG/10ML
80 INJECTION, POWDER, LYOPHILIZED, FOR SOLUTION INTRAVENOUS ONCE
Status: COMPLETED | OUTPATIENT
Start: 2020-02-17 | End: 2020-02-17

## 2020-02-17 RX ORDER — ONDANSETRON 4 MG/1
4 TABLET, ORALLY DISINTEGRATING ORAL EVERY 4 HOURS PRN
Status: DISCONTINUED | OUTPATIENT
Start: 2020-02-17 | End: 2020-02-19 | Stop reason: HOSPADM

## 2020-02-17 RX ORDER — AMOXICILLIN 250 MG
2 CAPSULE ORAL 2 TIMES DAILY
Status: DISCONTINUED | OUTPATIENT
Start: 2020-02-17 | End: 2020-02-19 | Stop reason: HOSPADM

## 2020-02-17 RX ORDER — ALBUTEROL SULFATE 90 UG/1
1-2 AEROSOL, METERED RESPIRATORY (INHALATION) EVERY 4 HOURS PRN
Status: DISCONTINUED | OUTPATIENT
Start: 2020-02-17 | End: 2020-02-19 | Stop reason: HOSPADM

## 2020-02-17 RX ORDER — CLOPIDOGREL BISULFATE 75 MG/1
75 TABLET ORAL DAILY
Status: DISCONTINUED | OUTPATIENT
Start: 2020-02-17 | End: 2020-02-17

## 2020-02-17 RX ORDER — FLUTICASONE PROPIONATE 44 UG/1
2 AEROSOL, METERED RESPIRATORY (INHALATION) DAILY
Status: DISCONTINUED | OUTPATIENT
Start: 2020-02-17 | End: 2020-02-19 | Stop reason: HOSPADM

## 2020-02-17 RX ORDER — ONDANSETRON 2 MG/ML
4 INJECTION INTRAMUSCULAR; INTRAVENOUS EVERY 4 HOURS PRN
Status: DISCONTINUED | OUTPATIENT
Start: 2020-02-17 | End: 2020-02-19 | Stop reason: HOSPADM

## 2020-02-17 RX ORDER — TAMSULOSIN HYDROCHLORIDE 0.4 MG/1
0.8 CAPSULE ORAL DAILY
Status: DISCONTINUED | OUTPATIENT
Start: 2020-02-17 | End: 2020-02-19 | Stop reason: HOSPADM

## 2020-02-17 RX ORDER — BISACODYL 10 MG
10 SUPPOSITORY, RECTAL RECTAL
Status: DISCONTINUED | OUTPATIENT
Start: 2020-02-17 | End: 2020-02-19 | Stop reason: HOSPADM

## 2020-02-17 RX ORDER — ROPINIROLE 1 MG/1
1 TABLET, FILM COATED ORAL 3 TIMES DAILY
Status: DISCONTINUED | OUTPATIENT
Start: 2020-02-17 | End: 2020-02-19 | Stop reason: HOSPADM

## 2020-02-17 RX ORDER — POLYETHYLENE GLYCOL 3350 17 G/17G
1 POWDER, FOR SOLUTION ORAL
Status: DISCONTINUED | OUTPATIENT
Start: 2020-02-17 | End: 2020-02-19 | Stop reason: HOSPADM

## 2020-02-17 RX ORDER — MONTELUKAST SODIUM 10 MG/1
10 TABLET ORAL EVERY EVENING
Status: DISCONTINUED | OUTPATIENT
Start: 2020-02-17 | End: 2020-02-19 | Stop reason: HOSPADM

## 2020-02-17 RX ORDER — FLUTICASONE PROPIONATE 50 MCG
1-2 SPRAY, SUSPENSION (ML) NASAL DAILY
Status: DISCONTINUED | OUTPATIENT
Start: 2020-02-17 | End: 2020-02-19 | Stop reason: HOSPADM

## 2020-02-17 RX ORDER — PRAVASTATIN SODIUM 20 MG
20 TABLET ORAL EVERY EVENING
Status: DISCONTINUED | OUTPATIENT
Start: 2020-02-17 | End: 2020-02-17

## 2020-02-17 RX ADMIN — PANTOPRAZOLE SODIUM 80 MG: 40 INJECTION, POWDER, FOR SOLUTION INTRAVENOUS at 15:04

## 2020-02-17 RX ADMIN — FLUTICASONE PROPIONATE 1 SPRAY: 50 SPRAY, METERED NASAL at 19:55

## 2020-02-17 RX ADMIN — MONTELUKAST 10 MG: 10 TABLET, FILM COATED ORAL at 19:48

## 2020-02-17 RX ADMIN — LEVALBUTEROL HYDROCHLORIDE 1.25 MG: 1.25 SOLUTION RESPIRATORY (INHALATION) at 18:22

## 2020-02-17 RX ADMIN — ROPINIROLE HYDROCHLORIDE 1 MG: 0.5 TABLET, FILM COATED ORAL at 19:48

## 2020-02-17 RX ADMIN — PRAVASTATIN SODIUM 20 MG: 20 TABLET ORAL at 19:48

## 2020-02-17 RX ADMIN — ROSUVASTATIN CALCIUM 40 MG: 10 TABLET, FILM COATED ORAL at 21:47

## 2020-02-17 RX ADMIN — ASPIRIN 81 MG CHEWABLE TABLET 81 MG: 81 TABLET CHEWABLE at 19:48

## 2020-02-17 RX ADMIN — TAMSULOSIN HYDROCHLORIDE 0.8 MG: 0.4 CAPSULE ORAL at 19:48

## 2020-02-17 RX ADMIN — PANTOPRAZOLE SODIUM 40 MG: 40 INJECTION, POWDER, LYOPHILIZED, FOR SOLUTION INTRAVENOUS at 19:49

## 2020-02-17 ASSESSMENT — COGNITIVE AND FUNCTIONAL STATUS - GENERAL
SUGGESTED CMS G CODE MODIFIER DAILY ACTIVITY: CH
DAILY ACTIVITIY SCORE: 24
SUGGESTED CMS G CODE MODIFIER MOBILITY: CH
MOBILITY SCORE: 24

## 2020-02-17 ASSESSMENT — PATIENT HEALTH QUESTIONNAIRE - PHQ9
1. LITTLE INTEREST OR PLEASURE IN DOING THINGS: NOT AT ALL
2. FEELING DOWN, DEPRESSED, IRRITABLE, OR HOPELESS: NOT AT ALL
SUM OF ALL RESPONSES TO PHQ9 QUESTIONS 1 AND 2: 0
2. FEELING DOWN, DEPRESSED, IRRITABLE, OR HOPELESS: NOT AT ALL
SUM OF ALL RESPONSES TO PHQ9 QUESTIONS 1 AND 2: 0
1. LITTLE INTEREST OR PLEASURE IN DOING THINGS: NOT AT ALL

## 2020-02-17 ASSESSMENT — ENCOUNTER SYMPTOMS
CHILLS: 0
COUGH: 0
DIZZINESS: 0
FEVER: 0
DIARRHEA: 0
BLOOD IN STOOL: 1
SHORTNESS OF BREATH: 0
WEAKNESS: 1
PALPITATIONS: 0
BACK PAIN: 0
DEPRESSION: 0
NAUSEA: 0
HEADACHES: 0
FOCAL WEAKNESS: 0
HEARTBURN: 0
ABDOMINAL PAIN: 0
HALLUCINATIONS: 0
MYALGIAS: 0
VOMITING: 0
SORE THROAT: 0

## 2020-02-17 ASSESSMENT — LIFESTYLE VARIABLES
ALCOHOL_USE: NO
ON A TYPICAL DAY WHEN YOU DRINK ALCOHOL HOW MANY DRINKS DO YOU HAVE: 0
TOTAL SCORE: 0
EVER HAD A DRINK FIRST THING IN THE MORNING TO STEADY YOUR NERVES TO GET RID OF A HANGOVER: NO
HOW MANY TIMES IN THE PAST YEAR HAVE YOU HAD 5 OR MORE DRINKS IN A DAY: 0
CONSUMPTION TOTAL: NEGATIVE
EVER_SMOKED: YES
HAVE YOU EVER FELT YOU SHOULD CUT DOWN ON YOUR DRINKING: NO
EVER FELT BAD OR GUILTY ABOUT YOUR DRINKING: NO
HAVE PEOPLE ANNOYED YOU BY CRITICIZING YOUR DRINKING: NO
AVERAGE NUMBER OF DAYS PER WEEK YOU HAVE A DRINK CONTAINING ALCOHOL: 0
TOTAL SCORE: 0
EVER_SMOKED: YES
TOTAL SCORE: 0

## 2020-02-17 NOTE — ED NOTES
Pt complaining of chest and stomach discomfort, notified MD and orders for Trop and ECG received.

## 2020-02-17 NOTE — ED TRIAGE NOTES
"Chief Complaint   Patient presents with   • Weakness     Pt c/o generalized weakness; sent by MD for low hgb; reports hgb was 7.7 Saturday and today is 5.5   • Abnormal Labs     /46   Pulse 85   Temp 36.2 °C (97.1 °F) (Temporal)   Resp 18   Ht 1.74 m (5' 8.5\")   Wt 73.6 kg (162 lb 4.1 oz)   SpO2 96%   BMI 24.31 kg/m²     Pt is dialysis pt. Dialysis M,W,F.  "

## 2020-02-17 NOTE — ED NOTES
Med Rec complete per Pt at bedside  Allergies Reviewed  No ABX in the last 14 days    Pt takes PLAVIX and ASPRIN but has held both medications since 2/14/2020

## 2020-02-17 NOTE — ED NOTES
Pt IV flushed before blood product administered, once started pt IV infiltrated after 2 min, stopped infusion, Adrianna BYRD is starting another IV.

## 2020-02-17 NOTE — ED PROVIDER NOTES
ED Provider Note    ED Provider Note    Primary care provider: Martha Light M.D.  Means of arrival: POV  History obtained from: Patient    CHIEF COMPLAINT  Chief Complaint   Patient presents with   • Weakness     Pt c/o generalized weakness; sent by MD for low hgb; reports hgb was 7.7 Saturday and today is 5.5   • Abnormal Labs     Seen at 2:10 PM.   HPI  Parmjit Castelan is a 79 y.o. male who presents to the Emergency Department for anemia.  The patient recently was on Coumadin, Plavix and aspirin when he developed some GI bleeding.  The Coumadin was stopped, he is still on the aspirin and Plavix.  He has been on omeprazole and has been compliant with this.  He noted steadily increasing fatigue over the past few days.  Hemoglobin last week was relatively stable at 7.7.  He had outpatient labs ordered by his gastroenterologist that showed worsening anemia and he presents here for evaluation.  He does note some dark stools for the past few days.  He did not take aspirin or Plavix today out of concern of continued bleeding.    Aside from the generalized fatigue he does not have any other symptoms.  He denies any headache, chest pain, abdominal pain, shortness of breath, lightheadedness.    REVIEW OF SYSTEMS  See HPI,   Remainder of ROS negative.     PAST MEDICAL HISTORY   has a past medical history of Anesthesia, Anticoagulation monitoring, special range, Aortic stenosis, Arterial leg ulcer (formerly [facility]) (11/8/2018), Atrial flutter (formerly [facility]) (6/12/2019), Basal cell carcinoma of left cheek (3/26/2015), BPH (7/14/2009), Bronchitis (12/25/2018), CAD (coronary artery disease) (2/20/2014), CATARACT, CHF (congestive heart failure), NYHA class II, chronic, systolic (formerly [facility]) E F30 in setting of atrial flutter (6/13/2019), Chronic respiratory failure with hypoxia (formerly [facility]) (5/8/2016), CKD (chronic kidney disease) stage 4, GFR 15-29 ml/min (formerly [facility]) (1/15/2010), COPD (chronic obstructive pulmonary disease) (formerly [facility]), Detached retina, Dialysis,  EMPHYSEMA, Glaucoma, Gout, Heart burn, Heart murmur, Hypertension, Indigestion, Kidney cyst, Leg pain, bilateral (8/10/2015), On supplemental oxygen therapy, Peripheral vascular disease (HCC) (8/10/2015), Pneumonia, Primary insomnia (3/22/2018), Proteinuria, PVD (peripheral vascular disease) ([facility]), and Sleep apnea.    SURGICAL HISTORY   has a past surgical history that includes cataract phaco with iol (08); av fistula creation (2010); av fistula revision (2010); av fistulogram (2010); angioplasty balloon (2010); av fistulogram (2010); angioplasty balloon (2010); vitrectomy posterior (2011); scleral buckling (2011); recovery (2011); vitrectomy posterior (10/11/2011); recovery (2012); recovery (2013); recovery (2013); av fistula thrombolysis (2013); recovery (2013); recovery (3/24/2014); recovery (2014); recovery (3/24/2015); recovery (2015); gastroscopy with biopsy (2016); colonoscopy - endo (8/15/2016); endoscopy procedure (3/21/2018); femoral endarterectomy (Left, 2019); femoral popliteal bypass (Left, 2019); angiogram (Left, 2019); other orthopedic surgery (); av fistula creation (Right, 2019); lisa (2019); cardioversion (2019); av fistula creation (Right, 2019); cath placement (Right, 2019); stent placement (2020); and gastroscopy-endo (2020).    SOCIAL HISTORY  Social History     Tobacco Use   • Smoking status: Former Smoker     Packs/day: 1.00     Years: 40.00     Pack years: 40.00     Types: Cigarettes     Last attempt to quit: 2009     Years since quittin.1   • Smokeless tobacco: Never Used   • Tobacco comment: 1 pk a day for 35 yrs, QUIT 2010   Substance Use Topics   • Alcohol use: No     Alcohol/week: 0.0 oz   • Drug use: No      Social History     Substance and Sexual Activity   Drug Use No       FAMILY HISTORY  Family History   Problem Relation Age of  "Onset   • Stroke Mother    • Hypertension Mother    • Lung Disease Father         Emphysema, resp failure   • Genitourinary () Problems Maternal Aunt         hematuria   • Hypertension Brother        CURRENT MEDICATIONS  Reviewed.  See Encounter Summary.     ALLERGIES  No Known Allergies    PHYSICAL EXAM  VITAL SIGNS: /46   Pulse 83   Temp 36.4 °C (97.6 °F) (Oral)   Resp 18   Ht 1.74 m (5' 8.5\")   Wt 73.6 kg (162 lb 4.1 oz)   SpO2 100%   BMI 24.31 kg/m²   Constitutional: Awake, alert in no apparent distress.  HENT: Normocephalic, Bilateral external ears normal. Nose normal.   Eyes: Conjunctival pallor, non-icteric, EOMI.    Thorax & Lungs: Easy unlabored respirations, Clear to ascultation bilaterally.  Cardiovascular: Regular rate, Regular rhythm, No murmurs, rubs or gallops.  Palpable thrill in the right upper extremity.  Abdomen:  Soft, nontender, nondistended, normal active bowel sounds.  Normal rectal tone, extremely dark stool, nearly melena, FOBT very strongly positive, control acceptable   :    Skin: Visualized skin is  Dry, No erythema, No rash.   Musculoskeletal:   No cyanosis, clubbing or edema.  Neurologic: Alert, Grossly non-focal.   Psychiatric: Normal affect, Normal mood  Lymphatic:  No cervical LAD    EKG   12 lead Interpreted by me  Rhythm:  Normal sinus rhythm   Rate: 78  Axis: normal  Ectopy: none  Conduction: Bundle branch block, unchanged  ST Segments: no acute change  T Waves: no acute change  Clinical Impression: Normal EKG without acute changes     RADIOLOGY  EC-ECHOCARDIOGRAM LTD W/O CONT    (Results Pending)         COURSE & MEDICAL DECISION MAKING  Pertinent Labs & Imaging studies reviewed. (See chart for details)    Differential diagnoses include but are not limited to: Symptomatic anemia    2:10 PM - Medical record reviewed, patient with ESRD, recently underwent angiogram with stenting and placement on Plavix, shortly after this he developed some anemia, thought to be due " to upper GI bleed.  Recent endoscopy showed gastritis but no source of bleeding.  The patient was started on a PPI.  He followed up 1 more time in the emergency department after this for generalized weakness, hemoglobin was at 7.7.  Outpatient hemoglobin today is 5.5.     2:23 PM-we will page GI.  Protonix ordered.  Type and cross ordered.    2:35 PM -Case discussed with GI who will evaluate the patient.    2:40 PM -case discussed with nephrology, they will place him on the list for dialysis tomorrow.    2:50 PM -case discussed with Dr. Patterson who will evaluate the patient for admission    Decision Making:  This is a 79 y.o. year old male who presents with recurrent GI bleeding of unclear source.  He is on aspirin and Plavix until today.  He is at increased risk for thrombosis of his stent unfortunately.  The patient had some gastritis but no definitive source was found on his most recent upper GI.  He has a significant drop in his hemoglobin from 7.7-5.5.  He is hemodynamically stable.  Potassium is mildly elevated, he does not require emergent dialysis.  He does not have fluid overload.  I discussed the case with GI who will evaluate the patient tomorrow, nephrology will perform dialysis either tonight or tomorrow morning.  I discussed the case with the hospitalist as well.  Transfusions were begun prior to transfer upstairs.  PPI was given.      CRITICAL CARE  The very real possibilty of a deterioration of this patient's condition required the highest level of my preparedness for sudden, emergent intervention.  I provided critical care services, which included medication orders, frequent reevaluations of the patient's condition and response to treatment, ordering and reviewing test results, and discussing the case with various consultants.  The critical care time associated with the care of the patient was 35 minutes. Review chart for interventions. This time is exclusive of any other billable procedures.            FINAL IMPRESSION  Symptomatic anemia  Upper GI bleed  End-stage renal disease

## 2020-02-18 ENCOUNTER — APPOINTMENT (OUTPATIENT)
Dept: CARDIOLOGY | Facility: MEDICAL CENTER | Age: 80
DRG: 377 | End: 2020-02-18
Attending: INTERNAL MEDICINE
Payer: MEDICARE

## 2020-02-18 LAB
ALBUMIN SERPL BCP-MCNC: 3.5 G/DL (ref 3.2–4.9)
BUN SERPL-MCNC: 115 MG/DL (ref 8–22)
CALCIUM SERPL-MCNC: 10.2 MG/DL (ref 8.4–10.2)
CHLORIDE SERPL-SCNC: 94 MMOL/L (ref 96–112)
CO2 SERPL-SCNC: 19 MMOL/L (ref 20–33)
CREAT SERPL-MCNC: 9.75 MG/DL (ref 0.5–1.4)
GLUCOSE SERPL-MCNC: 92 MG/DL (ref 65–99)
HGB BLD-MCNC: 7.3 G/DL (ref 14–18)
HGB BLD-MCNC: 7.5 G/DL (ref 14–18)
HGB BLD-MCNC: 8 G/DL (ref 14–18)
LV EJECT FRACT  99904: 60
LV EJECT FRACT MOD 2C 99903: 63.22
LV EJECT FRACT MOD 4C 99902: 48.65
LV EJECT FRACT MOD BP 99901: 56.85
MAGNESIUM SERPL-MCNC: 1.8 MG/DL (ref 1.5–2.5)
PHOSPHATE SERPL-MCNC: 3.5 MG/DL (ref 2.5–4.5)
POTASSIUM SERPL-SCNC: 5.1 MMOL/L (ref 3.6–5.5)
SODIUM SERPL-SCNC: 135 MMOL/L (ref 135–145)
TROPONIN T SERPL-MCNC: 516 NG/L (ref 6–19)

## 2020-02-18 PROCEDURE — 99233 SBSQ HOSP IP/OBS HIGH 50: CPT | Performed by: INTERNAL MEDICINE

## 2020-02-18 PROCEDURE — 83735 ASSAY OF MAGNESIUM: CPT

## 2020-02-18 PROCEDURE — A9270 NON-COVERED ITEM OR SERVICE: HCPCS | Performed by: INTERNAL MEDICINE

## 2020-02-18 PROCEDURE — 93321 DOPPLER ECHO F-UP/LMTD STD: CPT | Mod: 26 | Performed by: INTERNAL MEDICINE

## 2020-02-18 PROCEDURE — 700111 HCHG RX REV CODE 636 W/ 250 OVERRIDE (IP): Performed by: INTERNAL MEDICINE

## 2020-02-18 PROCEDURE — 85018 HEMOGLOBIN: CPT | Mod: 91

## 2020-02-18 PROCEDURE — 80069 RENAL FUNCTION PANEL: CPT

## 2020-02-18 PROCEDURE — 99222 1ST HOSP IP/OBS MODERATE 55: CPT | Performed by: INTERNAL MEDICINE

## 2020-02-18 PROCEDURE — 700101 HCHG RX REV CODE 250: Performed by: NURSE PRACTITIONER

## 2020-02-18 PROCEDURE — 700102 HCHG RX REV CODE 250 W/ 637 OVERRIDE(OP): Performed by: INTERNAL MEDICINE

## 2020-02-18 PROCEDURE — 93325 DOPPLER ECHO COLOR FLOW MAPG: CPT | Mod: 26 | Performed by: INTERNAL MEDICINE

## 2020-02-18 PROCEDURE — 90935 HEMODIALYSIS ONE EVALUATION: CPT

## 2020-02-18 PROCEDURE — 770020 HCHG ROOM/CARE - TELE (206)

## 2020-02-18 PROCEDURE — 84484 ASSAY OF TROPONIN QUANT: CPT

## 2020-02-18 PROCEDURE — 5A1D70Z PERFORMANCE OF URINARY FILTRATION, INTERMITTENT, LESS THAN 6 HOURS PER DAY: ICD-10-PCS | Performed by: INTERNAL MEDICINE

## 2020-02-18 PROCEDURE — 93308 TTE F-UP OR LMTD: CPT

## 2020-02-18 PROCEDURE — 93308 TTE F-UP OR LMTD: CPT | Mod: 26 | Performed by: INTERNAL MEDICINE

## 2020-02-18 PROCEDURE — C9113 INJ PANTOPRAZOLE SODIUM, VIA: HCPCS | Performed by: INTERNAL MEDICINE

## 2020-02-18 RX ORDER — SODIUM CHLORIDE 9 MG/ML
250 INJECTION, SOLUTION INTRAVENOUS
Status: DISCONTINUED | OUTPATIENT
Start: 2020-02-18 | End: 2020-02-19 | Stop reason: HOSPADM

## 2020-02-18 RX ORDER — ALBUMIN (HUMAN) 12.5 G/50ML
12.5 SOLUTION INTRAVENOUS
Status: DISCONTINUED | OUTPATIENT
Start: 2020-02-18 | End: 2020-02-19 | Stop reason: HOSPADM

## 2020-02-18 RX ADMIN — TRAZODONE HYDROCHLORIDE 150 MG: 50 TABLET ORAL at 01:50

## 2020-02-18 RX ADMIN — UMECLIDINIUM BROMIDE AND VILANTEROL TRIFENATATE 1 PUFF: 62.5; 25 POWDER RESPIRATORY (INHALATION) at 05:27

## 2020-02-18 RX ADMIN — FLUTICASONE PROPIONATE 1 SPRAY: 50 SPRAY, METERED NASAL at 05:25

## 2020-02-18 RX ADMIN — ROSUVASTATIN CALCIUM 40 MG: 10 TABLET, FILM COATED ORAL at 18:15

## 2020-02-18 RX ADMIN — PANTOPRAZOLE SODIUM 40 MG: 40 INJECTION, POWDER, LYOPHILIZED, FOR SOLUTION INTRAVENOUS at 05:22

## 2020-02-18 RX ADMIN — PANTOPRAZOLE SODIUM 40 MG: 40 INJECTION, POWDER, LYOPHILIZED, FOR SOLUTION INTRAVENOUS at 18:12

## 2020-02-18 RX ADMIN — ROPINIROLE HYDROCHLORIDE 1 MG: 0.5 TABLET, FILM COATED ORAL at 18:14

## 2020-02-18 RX ADMIN — TAMSULOSIN HYDROCHLORIDE 0.8 MG: 0.4 CAPSULE ORAL at 05:25

## 2020-02-18 RX ADMIN — ROPINIROLE HYDROCHLORIDE 1 MG: 0.5 TABLET, FILM COATED ORAL at 11:56

## 2020-02-18 RX ADMIN — FLUTICASONE PROPIONATE 88 MCG: 44 AEROSOL, METERED RESPIRATORY (INHALATION) at 05:27

## 2020-02-18 RX ADMIN — CALCIUM ACETATE 667 MG: 667 CAPSULE ORAL at 11:56

## 2020-02-18 RX ADMIN — POLYETHYLENE GLYCOL 3350, SODIUM SULFATE ANHYDROUS, SODIUM BICARBONATE, SODIUM CHLORIDE, POTASSIUM CHLORIDE 4 L: 236; 22.74; 6.74; 5.86; 2.97 POWDER, FOR SOLUTION ORAL at 14:05

## 2020-02-18 RX ADMIN — TRAZODONE HYDROCHLORIDE 300 MG: 50 TABLET ORAL at 21:03

## 2020-02-18 RX ADMIN — CALCIUM ACETATE 667 MG: 667 CAPSULE ORAL at 18:14

## 2020-02-18 RX ADMIN — ROPINIROLE HYDROCHLORIDE 1 MG: 0.5 TABLET, FILM COATED ORAL at 05:25

## 2020-02-18 RX ADMIN — ASPIRIN 81 MG CHEWABLE TABLET 81 MG: 81 TABLET CHEWABLE at 05:28

## 2020-02-18 RX ADMIN — MONTELUKAST 10 MG: 10 TABLET, FILM COATED ORAL at 18:15

## 2020-02-18 ASSESSMENT — ENCOUNTER SYMPTOMS
BRUISES/BLEEDS EASILY: 0
INSOMNIA: 0
CONSTIPATION: 0
HEADACHES: 0
TINGLING: 0
HEARTBURN: 0
FEVER: 0
BLOOD IN STOOL: 0
DIAPHORESIS: 0
SHORTNESS OF BREATH: 0
ABDOMINAL PAIN: 0
VOMITING: 0
DEPRESSION: 0
BACK PAIN: 0
PALPITATIONS: 0
DIARRHEA: 0
DIZZINESS: 0
COUGH: 0
CHILLS: 0
NAUSEA: 0
STRIDOR: 0
MYALGIAS: 0
SPUTUM PRODUCTION: 0
ORTHOPNEA: 0

## 2020-02-18 NOTE — PROGRESS NOTES
Received report from Donnell BYRD, ED    Pt arrived to unit via [facility]. Ambulated from [facility] to bed,1 assist. Tele monitor applied, vitals taken. Pt assessed. A&O 4. Admit profile and med rec complete. Discussed POC with pt. Welcome folder provided and discussed. Communication board filled out. Questions and concerns addressed, verbalized understanding. Fall precautions in place. Pt demonstrates ability to use call light appropriately. Pt left in lowest position. Bed locked and low, bed alarm on.

## 2020-02-18 NOTE — FLOWSHEET NOTE
02/17/20 1827   Vital Signs   Pulse 84   Respiration 19   Pulse Oximetry 95 %   $ Pulse Oximetry (Spot Check) Yes   Respiratory Assessment   Level of Consciousness Alert   Breath Sounds   RUL Breath Sounds Diminished   RML Breath Sounds Diminished   RLL Breath Sounds Diminished   SILVANA Breath Sounds Diminished   LLL Breath Sounds Diminished   Secretions   Cough Dry   Oxygen   O2 (LPM) 0   O2 Delivery Device Room air w/o2 available   Smoking History   Have you ever smoked Yes   Have you smoked in the last 12 months No   Confirm Quit Date 02/17/09   COPD Risk Screening   Do you have a history of COPD? Yes   Do you have a Pulmonologist? Yes

## 2020-02-18 NOTE — PROGRESS NOTES
2nd blood transfusion started. Pt currently tolerating. NAD at this time. Admission profile complete. Oriented pt to room and call light use. Explained POC with pt. Pt had concerns regarding hemodialysis. Writer touched base with Dr. Patterson personally and notified pt will be getting HD tomorrow. Pt verbalized understanding. Offered assist. Pt inquired about eating food, explained implications of diet order and nursing implications for being admitted for possible GI bleed and needing to be NPO. Pt showing signs of frustration, however verbalized understanding. Reassured pt. Will continue to monitor.

## 2020-02-18 NOTE — PROGRESS NOTES
Telemetry Shift Summary    Rhythm:  SR  HR Range: 60-80  Ectopy: none  Measurements: .24/.16/.40          Normal Values  Rhythm SR  HR Range   Measurements 0.12 / 0.20 / 0.06-0.10 / 0.30-0.52

## 2020-02-18 NOTE — ASSESSMENT & PLAN NOTE
Secondary to GI bleed.  Recent upper endoscopy did not reveal active bleeding  GI has been consulted  Enteroscopy and colonoscopy planned for later this week per GI  Continue iv protonix bid  Monitor hemoglobin

## 2020-02-18 NOTE — PROGRESS NOTES
Recived call from lab with critical lab results.  Trop: [phone] x2  Call Dr. Patterson she will consult Card

## 2020-02-18 NOTE — CARE PLAN
Problem: Bowel/Gastric:  Goal: Normal bowel function is maintained or improved  Outcome: PROGRESSING SLOWER THAN EXPECTED   Provided time to explain the scheduled test and what diet is ordered. Explained how the GI tract needs rest. Will begin GI prep later today.  Problem: Knowledge Deficit  Goal: Knowledge of disease process/condition, treatment plan, diagnostic tests, and medications will improve  Outcome: PROGRESSING AS EXPECTED   Patient received education about test from MD and RN. Patient and family given time to ask questions and express concerns.

## 2020-02-18 NOTE — H&P
Hospital Medicine History & Physical Note    Date of Service  2/17/2020    Primary Care Physician  Martha Light M.D.    Consultants  Nephrology  GI - [facility]    Code Status  Full    Chief Complaint  Dark stool, weakness    History of Presenting Illness  79 y.o. male with a history of end-stage renal disease on dialysis Monday was a Friday, hypertension, hyperlipidemia, coronary disease with a stent placed 1 month ago has been on aspirin and Plavix, recent hospitalization for anemia secondary to GI bleeding with unidentified source who presented 2/17/2020 with dark stool and weakness.    Patient had been doing well with his chronic medical conditions until about 1 month ago when he had an NSTEMI and had a stent placed into the LAD.  Since that time he was placed on aspirin and Plavix in addition to Coumadin which he was taking for atrial fibrillation.  About 1 week ago he was hospitalized for weakness and dark stool.  GI was consulted and did an upper endoscopy which revealed a Schatzki's ring and esophagitis however no active bleeding.  Decision was made at that time to continue aspirin and Plavix however to discontinue Coumadin.  The patient has not been on Coumadin.  Initially, he felt better after receiving transfusions during this hospital stay however, upon returning home he became increasingly weak.  He had routine labs drawn today and revealed a hemoglobin of 5.5.  He has not had any bright red blood in the stool, no abdominal pain, nausea or vomiting.      He has had GI bleeding about 5 years ago and had an outpatient capsule endoscopy which was negative.    He denies any chest pain or shortness of breath.    Review of Systems  Review of Systems   Constitutional: Positive for malaise/fatigue. Negative for chills and fever.   HENT: Negative for sore throat.    Respiratory: Negative for cough and shortness of breath.    Cardiovascular: Negative for chest pain and palpitations.   Gastrointestinal: Positive for  blood in stool and melena. Negative for abdominal pain, diarrhea, heartburn, nausea and vomiting.   Genitourinary: Negative for dysuria and frequency.   Musculoskeletal: Negative for back pain and myalgias.   Neurological: Positive for weakness. Negative for dizziness, focal weakness and headaches.   Psychiatric/Behavioral: Negative for depression and hallucinations.   All other systems reviewed and are negative.      Past Medical History   has a past medical history of Anesthesia, Anticoagulation monitoring, special range, Aortic stenosis, Arterial leg ulcer ([facility]) (11/8/2018), Atrial flutter ([facility]) (6/12/2019), Basal cell carcinoma of left cheek (3/26/2015), BPH (7/14/2009), Bronchitis (12/25/2018), CAD (coronary artery disease) (2/20/2014), CATARACT, CHF (congestive heart failure), NYHA class II, chronic, systolic ([facility]) E F30 in setting of atrial flutter (6/13/2019), Chronic respiratory failure with hypoxia ([facility]) (5/8/2016), CKD (chronic kidney disease) stage 4, GFR 15-29 ml/min ([facility]) (1/15/2010), COPD (chronic obstructive pulmonary disease) ([facility]), Detached retina, Dialysis, EMPHYSEMA, Glaucoma, Gout, Heart burn, Heart murmur, Hypertension, Indigestion, Kidney cyst, Leg pain, bilateral (8/10/2015), On supplemental oxygen therapy, Peripheral vascular disease ([facility]) (8/10/2015), Pneumonia, Primary insomnia (3/22/2018), Proteinuria, PVD (peripheral vascular disease) ([facility]), and Sleep apnea.    Surgical History   has a past surgical history that includes cataract phaco with iol (4/8/08); av fistula creation (2/12/2010); av fistula revision (2/19/2010); av fistulogram (7/23/2010); angioplasty balloon (7/23/2010); av fistulogram (9/17/2010); angioplasty balloon (9/17/2010); vitrectomy posterior (1/18/2011); scleral buckling (1/18/2011); recovery (8/12/2011); vitrectomy posterior (10/11/2011); recovery (7/23/2012); recovery (1/29/2013); recovery (7/2/2013); av fistula thrombolysis (7/2/2013); recovery (12/17/2013); recovery  (3/24/2014); recovery (7/29/2014); recovery (3/24/2015); recovery (12/23/2015); gastroscopy with biopsy (8/13/2016); colonoscopy - endo (8/15/2016); endoscopy procedure (3/21/2018); femoral endarterectomy (Left, 1/25/2019); femoral popliteal bypass (Left, 1/25/2019); angiogram (Left, 1/25/2019); other orthopedic surgery (1998); av fistula creation (Right, 2/21/2019); lisa (6/13/2019); cardioversion (6/13/2019); av fistula creation (Right, 8/6/2019); cath placement (Right, 8/6/2019); stent placement (01/26/2020); and gastroscopy-endo (2/7/2020).     Family History  family history includes Genitourinary () Problems in his maternal aunt; Hypertension in his brother and mother; Lung Disease in his father; Stroke in his mother.     Social History   reports that he quit smoking about 11 years ago. His smoking use included cigarettes. He has a 40.00 pack-year smoking history. He has never used smokeless tobacco. He reports that he does not drink alcohol or use drugs.    Allergies  No Known Allergies    Medications  Prior to Admission Medications   Prescriptions Last Dose Informant Patient Reported? Taking?   B Complex-C-Folic Acid (DIALYVITE TABLET) Tab 2/16/2020 at AM Significant Other No No   Sig: TAKE ONE TABLET BY MOUTH DAILY   Calcium Acetate, Phos Binder, 667 MG Cap 2/15/2020 at AM Significant Other No No   Sig: TAKE 3 CAPSULES BY MOUTH WITH MEALS (3 MEALS) AND 2 CAPSULES WITH SNACKS (2 SNACKS)   Fluticasone-Umeclidin-Vilant (TRELEGY ELLIPTA) 100-62.5-25 MCG/INH AEROSOL POWDER, BREATH ACTIVATED 2/16/2020 at AM Significant Other No No   Sig: Inhale 1 Inhaler by mouth every day.   ROPINIRole (REQUIP) 0.5 MG Tab 2/16/2020 at AM Significant Other No No   Sig: TAKE TWO TABLETS BY MOUTH DAILY   albuterol 108 (90 Base) MCG/ACT Aero Soln inhalation aerosol 2/15/2020 at AM Significant Other Yes No   Sig: Inhale 1-2 Puffs by mouth every four hours as needed for Shortness of Breath.   aspirin (ASA) 81 MG Chew Tab chewable  tablet 2/14/2020 at AM Significant Other Yes No   Sig: Take 81 mg by mouth every day.   clopidogrel (PLAVIX) 75 MG Tab 2/14/2020 at AM Significant Other Yes No   Sig: Take 75 mg by mouth every day.   fluticasone (FLONASE) 50 MCG/ACT nasal spray 2/16/2020 at AM Significant Other No No   Sig: Spray 1-2 Sprays in nose every day. Each nostril.   levalbuterol (XOPENEX) 1.25 MG/3ML Nebu Soln 2/16/2020 at AM Significant Other No No   Sig: 3 mL by Nebulization route every four hours as needed for Shortness of Breath.   montelukast (SINGULAIR) 10 MG Tab 2/16/2020 at PM Significant Other No No   Sig: Take 1 Tab by mouth every evening.   omeprazole (PRILOSEC) 40 MG delayed-release capsule 2/16/2020 at AM Significant Other No No   Sig: TAKE ONE CAPSULE BY MOUTH DAILY   pravastatin (PRAVACHOL) 20 MG Tab 2/16/2020 at PM Significant Other Yes No   Sig: Take 20 mg by mouth every evening.   tamsulosin (FLOMAX) 0.4 MG capsule 2/16/2020 at AM Significant Other Yes No   Sig: Take 0.8 mg by mouth every day.   torsemide (DEMADEX) 10 MG tablet 2/16/2020 at AM Significant Other No No   Sig: TAKE THREE TABLETS BY MOUTH DAILY   traZODone (DESYREL) 150 MG Tab 2/16/2020 at PM Significant Other Yes No   Sig: Take 150-300 mg by mouth every bedtime.      Facility-Administered Medications: None       Physical Exam  Temp:  [36.2 °C (97.1 °F)-36.6 °C (97.9 °F)] 36.6 °C (97.9 °F)  Pulse:  [78-91] 80  Resp:  [16-18] 16  BP: (113-156)/(46-69) 156/69  SpO2:  [75 %-100 %] 100 %    Physical Exam  Constitutional:       Appearance: Normal appearance. He is ill-appearing (Chronically).   HENT:      Head: Normocephalic and atraumatic.      Nose: Nose normal.      Mouth/Throat:      Mouth: Mucous membranes are moist.   Eyes:      Extraocular Movements: Extraocular movements intact.      Pupils: Pupils are equal, round, and reactive to light.   Neck:      Musculoskeletal: Normal range of motion and neck supple.   Cardiovascular:      Rate and Rhythm: Normal  rate and regular rhythm.      Heart sounds: No murmur.   Pulmonary:      Effort: Pulmonary effort is normal. No respiratory distress.      Breath sounds: Normal breath sounds. No stridor.   Abdominal:      General: Abdomen is flat. Bowel sounds are normal. There is no distension.      Palpations: Abdomen is soft.      Tenderness: There is no abdominal tenderness. There is no guarding.   Musculoskeletal:         General: No swelling, tenderness or deformity.   Skin:     General: Skin is warm and dry.      Coloration: Skin is pale.   Neurological:      General: No focal deficit present.      Mental Status: He is alert and oriented to person, place, and time. Mental status is at baseline.   Psychiatric:         Mood and Affect: Mood normal.         Behavior: Behavior normal.         Thought Content: Thought content normal.         Laboratory:  Recent Labs     02/15/20  1322 02/17/20  1117 02/17/20  1433   WBC 4.5* 5.6 6.1   RBC 2.44* 1.77* 1.69*   HEMOGLOBIN 7.7* 5.6* 5.5*   HEMATOCRIT 25.3* 18.4* 18.0*   .7* 104.0* 104.7*   MCH 31.6 31.6 32.0   MCHC 30.4* 30.4* 30.5*   RDW 65.3* 65.2* 65.3*   PLATELETCT 244 222 253   MPV 10.1 9.7 10.3     Recent Labs     02/15/20  1322 02/17/20  1433   SODIUM 139 134*   POTASSIUM 4.5 5.3   CHLORIDE 95* 92*   CO2 27 22   GLUCOSE 102* 110*   BUN 62* 110*   CREATININE 6.29* 9.41*   CALCIUM 10.2 10.4*     Recent Labs     02/15/20  1322 02/17/20  1433   ALTSGPT  --  12   ASTSGOT  --  13   ALKPHOSPHAT  --  103*   TBILIRUBIN  --  0.4   GLUCOSE 102* 110*         No results for input(s): NTPROBNP in the last 72 hours.      No results for input(s): TROPONINT in the last 72 hours.    Urinalysis:    No results found     Imaging:  No orders to display         Assessment/Plan:  I anticipate this patient will require at least two midnights for appropriate medical management, necessitating inpatient admission.    * GI bleed  Assessment & Plan  Unclear at this point if it is upper or lower.  On  2/7/2020 he had an EGD done by Dr. anderson from [facility] which showed esophagitis and a Schatzki's ring however no active bleeding.  5 years ago he had a capsule endoscopy which he states was nondiagnostic however these records are not available.  He is on aspirin and Plavix for a stent that was placed approximately 1 month ago, unfortunately, these need to be continued at this point  4 0 he has not been taking Coumadin which was previously prescribed  Continues to have dark brown stool and weakness, hemoglobin of 5.5  Transfused 2 units  Monitor hemoglobin every 8  GI has been consulted, he may need a lower endoscopy  He will need an outpatient capsule endoscopy  If he becomes hemodynamically unstable, hold aspirin and Plavix  Continue Protonix infusion    Severe aortic stenosis- (present on admission)  Assessment & Plan  Follows with Dr. Godoy and is considering TAVR    Essential hypertension- (present on admission)  Assessment & Plan  Blood pressure is controlled    PAD (peripheral artery disease) ([facility])- (present on admission)  Assessment & Plan  Continue pravastatin    Dyslipidemia- (present on admission)  Assessment & Plan  Pravastatin    Coronary artery disease- (present on admission)  Assessment & Plan  He had an LAD stent placed 1 month ago, follows with Dr. Godoy as an outpatient  Continue aspirin and Plavix as the risk of stopping these outweighs the benefits at this point  We will need to evaluate further if he shows evidence of active or rapid bleeding  Continue pravastatin    ESRD (end stage renal disease) ([facility])- (present on admission)  Assessment & Plan  He is on dialysis Monday Wednesday Friday.  Nephrology is been consulted and they will plan for dialysis in the morning  Continue PhosLo    Acute on chronic blood loss anemia  Assessment & Plan  Secondary to GI bleed.  Recent upper endoscopy did not reveal active bleeding  GI has been consulted  Likely lower GI inpatient and outpatient capsule  endoscopy  Trend hemoglobin every 8  Transfuse 2 units now  Protonix infusion  He is hemodynamically stable    Pancytopenia (HCC)- (present on admission)  Assessment & Plan  Chronic, stable other than hemoglobin        VTE prophylaxis: SCDs in the setting of active bleeding

## 2020-02-18 NOTE — ASSESSMENT & PLAN NOTE
Follows with Dr. Godoy and is considering TAVR at some future date after current situation is stabilized

## 2020-02-18 NOTE — ASSESSMENT & PLAN NOTE
He had an LAD stent placed 1 month ago, follows with Dr. Godoy as an outpatient  Continue aspirin ok to hold plavix per cardiology  Continue pravastatin, troponin elevated but stable

## 2020-02-18 NOTE — PROGRESS NOTES
Gastroenterology Progress Note     Author: ILAN Norman   Date & Time Created: 2/18/2020 10:35 AM    Chief Complaint:  Anemia    Interval History:  Hgb 7.5 after 2 units pRBCs.  Recheck pending.  Getting dialysis now.   Denies any nausea, vomiting, abdominal pain.  No BM since yesterday.     Review of Systems:  Review of Systems   Respiratory: Negative for shortness of breath.    Cardiovascular: Negative for chest pain.   Gastrointestinal: Negative for abdominal pain, blood in stool, constipation, diarrhea, heartburn, melena, nausea and vomiting.       Physical Exam:  Physical Exam  Vitals signs and nursing note reviewed.   HENT:      Head: Normocephalic.   Eyes:      Conjunctiva/sclera: Conjunctivae normal.   Cardiovascular:      Rate and Rhythm: Normal rate.      Heart sounds: Murmur present.   Pulmonary:      Effort: Pulmonary effort is normal.      Breath sounds: Normal breath sounds.   Abdominal:      General: Abdomen is flat.      Palpations: Abdomen is soft.      Comments: Hypoactive BS   Skin:     General: Skin is warm and dry.   Neurological:      General: No focal deficit present.      Mental Status: He is alert and oriented to person, place, and time.   Psychiatric:         Mood and Affect: Mood normal.         Behavior: Behavior normal.         Thought Content: Thought content normal.         Judgment: Judgment normal.         Labs:          Recent Labs     02/15/20  1322 02/17/20  1433 02/18/20  0228   SODIUM 139 134* 135   POTASSIUM 4.5 5.3 5.1   CHLORIDE 95* 92* 94*   CO2 27 22 19*   BUN 62* 110* 115*   CREATININE 6.29* 9.41* 9.75*   MAGNESIUM  --   --  1.8   PHOSPHORUS  --   --  3.5   CALCIUM 10.2 10.4* 10.2     Recent Labs     02/15/20  1322 02/17/20  1433 02/18/20 0228   ALTSGPT  --  12  --    ASTSGOT  --  13  --    ALKPHOSPHAT  --  103*  --    TBILIRUBIN  --  0.4  --    GLUCOSE 102* 110* 92     Recent Labs     02/15/20  1322 02/17/20  1117 02/17/20  1433 02/18/20 0228    RBC 2.44* 1.77* 1.69*  --    HEMOGLOBIN 7.7* 5.6* 5.5* 7.5*   HEMATOCRIT 25.3* 18.4* 18.0*  --    PLATELETCT 244 222 253  --    IRON  --  59  --   --    FERRITIN  --  208.0  --   --    TOTIRONBC  --  312  --   --      Recent Labs     02/15/20  1322 20  1117 20  1433   WBC 4.5* 5.6 6.1   NEUTSPOLYS 72.00 81.60* 80.10*   LYMPHOCYTES 13.00* 7.90* 9.10*   MONOCYTES 10.30 6.60 7.90   EOSINOPHILS 3.60 3.00 2.30   BASOPHILS 0.70 0.40 0.30   ASTSGOT  --   --  13   ALTSGPT  --   --  12   ALKPHOSPHAT  --   --  103*   TBILIRUBIN  --   --  0.4     Hemodynamics:  Temp (24hrs), Av.5 °C (97.7 °F), Min:36.2 °C (97.1 °F), Max:36.6 °C (97.9 °F)  Temperature: 36.6 °C (97.9 °F)  Pulse  Av.6  Min: 74  Max: 91   Blood Pressure : 133/49     Respiratory:    Respiration: 18, Pulse Oximetry: 96 %     Given By:: Mouthpiece  RUL Breath Sounds: Diminished, RML Breath Sounds: Diminished, RLL Breath Sounds: Diminished, SILVANA Breath Sounds: Diminished, LLL Breath Sounds: Diminished  Fluids:    Intake/Output Summary (Last 24 hours) at 2020 1035  Last data filed at 2020 2315  Gross per 24 hour   Intake 1008.75 ml   Output --   Net 1008.75 ml     Weight: 73.6 kg (162 lb 4.1 oz)  GI/Nutrition:  Orders Placed This Encounter   Procedures   • Diet Order Clear Liquids - No Red Foods, Renal     Standing Status:   Standing     Number of Occurrences:   1     Order Specific Question:   Diet:     Answer:   Clear Liquids - No Red Foods [12]     Order Specific Question:   Diet:     Answer:   Renal [8]     Medical Decision Making, by Problem:  Active Hospital Problems    Diagnosis   • *GI bleed [K92.2]   • Severe aortic stenosis [I35.0]   • Essential hypertension [I10]   • PAD (peripheral artery disease) ([facility]) [I73.9]   • Dyslipidemia [E78.5]   • Coronary artery disease [I25.10]   • HELGA (obstructive sleep apnea) [G47.33]   • ESRD (end stage renal disease) ([facility]) [N18.6]   • Acute on chronic blood loss anemia [D62]   • Pancytopenia  ([facility]) [P74.252]       Plan  Anemia  Cardiology would like him on dual antiplatelet therapy, but has ok'd Plavix to be held pending endoscopy to evaluate for a source of GI bleeding.  We have scheduled him for an esophagogastroduoenoscopy, push enteroscopy, and colonoscopy tomorrow morning at 0730.  Ideally his dialysis schedule can accommodate this. Discussed with dialysis RN and bedside RN.   1.  Clear liquid diet today, NPO at midnight.  Golytely bowel prep.   2.  Monitor hemoglobin and transfuse to maintain hemoglobin above 7.  3.  Continue PPI drip.   4.  Fall precautions.        Quality-Core Measures

## 2020-02-18 NOTE — ASSESSMENT & PLAN NOTE
Unclear at this point if it is upper or lower.  On 2/7/2020 he had an EGD done by Dr. anderson from [facility] which showed esophagitis and a Schatzki's ring however no active bleeding.  5 years ago he had a capsule endoscopy which he states was nondiagnostic however these records are not available.the p    The patient was Transfused 2 units for hemoglobin 5.5  Monitor hemoglobin every 8 hours for now  GI has been consulted and plans for inpatient scope tomorrow or thursday  He will need an outpatient capsule endoscopy

## 2020-02-18 NOTE — PROGRESS NOTES
[facility] Progress Note      HD ordered by Dr. Slater. Treatment started at 1010 and ended at 1310.     Total Net UF 2000mL.    Pt tolerated treatment well with no issues. See paper flow sheet for details. Cannulation needles taken out, held pressure for 10 min and placed gauze dressing with no bleeding. YANN AVF + for bruit/thrill. Report given to LU Shen RN.

## 2020-02-18 NOTE — PROGRESS NOTES
Received report from ROCHELLE Gallagher. Patient is awkae, Pt resting comfortably in bed, plan of care discussed with patient and dialysis will happen today today, declines any needs at this time and denies pain. Call light within reach and safety precautions in place.

## 2020-02-18 NOTE — CONSULTS
DATE OF SERVICE:  02/17/2020    CHIEF COMPLAINT:  Elevated troponin.    HISTORY OF PRESENT ILLNESS:  The patient is a 79-year-old gentleman seen in   consultation at the request of Katina Patterson for evaluation of elevated   troponin and recent coronary artery intervention.    The patient has a complex medical history.  He has severe aortic stenosis.  The patient was seen   previously by Dr. Crum in 09/2019, and at that time, it was felt that his   aortic stenosis was moderate and medical therapy was recommended.  Also,   patient had numerous comorbidities including being on dialysis and having   peripheral vascular disease.  He has subsequently been evaluated for TAVR by   Dr. Godoy at [facility].  The most recent echo reveals that his aortic   stenosis has progressed and his peak and mean valve gradients are now 73 and   49 respectively.    He underwent a screening angiography at [facility] on 02/04/2020 in   preparation for possible TAVR, and was found to have a lesion that was stented.    We do not have the [facility] records, but the lesion was in the LAD according to verbal report.  The   patient was discharged on aspirin, Plavix and Coumadin because of a history of   PAF.    He was admitted here on 2/6 with abdominal pain and dyspnea and found   to have severe anemia with hemoglobin and hematocrit of 5.7 and 18   respectively.  Patient was transfused and was  seen by GI.  An upper endoscopy   was performed.  He was discharged on 02/08/2020 on an aspirin and clopidogrel   without Coumadin.    Yesterday,  he felt very weak while at his son's house   and could not walk from room to room without resting.  He again felt weak   today and presented to the ER where labs revealed an H and H of 5.6 and 18.4.    At the time of discharge, he was 10.5 and 32.  Lab on 02/15/2020 showed that   he had dropped to 7.7 and 25 respectively, but apparently was not admitted.    He denies any chest pain or chest pressure.  He had  no anginal symptoms prior   to his stenting.    Patient has a history of dialysis.  There is no history of diabetes.  He has   not smoked for 11 years.  There is no family history of premature coronary   artery disease.    The patient also has a history of PAF and has been on anticoagulation in the   past for this.    ALLERGIES:  None.    MEDICATIONS:  On admission include aspirin 81 mg a day, clopidogrel 75 mg a   day, calcium acetate, albuterol inhaler, Flonase nasal spray, Singulair 10 mg   daily, pravastatin 20 mg a day, Requip 0.5 mg a day, torsemide 10 mg a day and   trazodone 150 mg at bedtime.  The patient has been on dialysis for 9 years   and is currently on thrice weekly dialysis.    ILLNESSES:  Severe aortic stenosis.  Coronary artery disease.  Recent stenting   of the LAD, hyperlipidemia, diabetes mellitus, hypertension, peripheral   vascular disease and history of anticoagulation, recently stopped.  COPD.    SOCIAL HISTORY:  The patient is .  He has not smoked for approximately   11 years.    FAMILY HISTORY:  As described above.  There is no family history of premature   coronary artery disease.  Brother has hypertension.  Father had lung disease.    Mother had a stroke.    REVIEW OF SYSTEMS:  CONSTITUTIONAL:  Patient was feeling poorly really ever since discharge and   felt much worse over the last few days with weakness with exertion.  NEUROLOGIC:  No history of stroke.  No history of weakness in his arms or   legs.  No history of difficulty speaking.  PULMONARY:  He has history of COPD.  He has had exertional dyspnea prior to   admission.  No wheezing.  CARDIAC:  As above.  GASTROINTESTINAL:  No nausea or vomiting.  He knows his stools have been dark,   but no fabiana bleeding.  RENAL:  History of dialysis for 9 years.  MUSCULOSKELETAL:  No recent trauma or injury.    PHYSICAL EXAMINATION:  GENERAL:  Patient is alert and cooperative and in no distress.  He is not   tachypneic.  VITAL SIGNS:  He  is afebrile, blood pressure currently 133/47, heart rate is   90.  HEENT:  Pupils are equal, gaze conjugate.  There is no scleral icterus.  NECK:  There is no JVD.  Carotid upstroke is delayed.  LUNGS:  Clear without wheezes or rales.  BACK:  Without deformity.  HEART:  Regular rate and rhythm with III/VI murmur heard best at the right   sternal border.  Parvus et tardus is present.  ABDOMEN:  Soft and nontender and not distended.  No pain to palpation.  No   hepatomegaly.  EXTREMITIES:  There is trace edema.  Posterior tibial pulses are 1+.  SKIN:  Warm and dry.  There is evidence of stasis dermatitis to right lower   extremity and also has some bruising around the right knee.  NEUROLOGIC:  The patient is alert and cooperative.  There is no facial droop.    DIAGNOSTIC IMAGING:  EKG personally reviewed.  This demonstrates sinus rhythm   with a right bundle-branch block.  There is left axis deviation versus prior   inferior wall MI.  There is ST segment depression in V3 through V6.    PERTINENT LABORATORY:  Troponin is elevated at 614.  Potassium is 5.3,   creatinine is 9.4, hemoglobin is 5.5, hematocrit is 18%, platelet count is   253,000.    IMPRESSION:  1.  Elevated troponin.  Patient has had no chest pain.  He underwent stenting   of his LAD approximately 13 days ago. The troponin elevation is higher than what would be   expected  due to his dialysis and reduced clearance of the troponin molecule.  He may have had a type 2 infarct   (supply-demand imbalance), although acute stent thrombosis is always a worry.   .     We will obtain another troponin and also obtain a   limited echo to reevaluate his LV function.        As he has had a   life-threatening gastrointestinal bleed, I would recommend we hold his Plavix   for the moment and continue low dose enteric aspirin.  He has already been   seen by the GI colleagues and is scheduled for upper and lower   endoscopy in the morning.  Further recommendations regarding  antiplatelet   therapy will be made after his endoscopy.  Ideally, he should be on dual   antiplatelet therapy, but on the other hand, he has had two life threatening   bleeds within a month.  2.  Aortic stenosis, severe by exam and by recent echo.  He has tolerated his   blood loss surprisingly well, which makes me think that this has been more   chronic.  Because of his severe aortic stenosis, he will not tolerate acute volume   loss very well.  As noted above, he is currently hemodynamically stable.  3.  Coronary artery disease status post stenting of LAD on 02/14/2020 at [facility] by Dr. Godoy.  4.  Gastrointestinal bleeding.  Etiology is uncertain.  Patient has had 2   life-threatening GI bleeds this month.  He is scheduled for upper and lower   endoscopy tomorrow.  5.  End-stage renal disease, on dialysis.         ____________________________________     MD FREDIS WARREN / NTS    DD:  02/17/2020 20:01:42  DT:  02/17/2020 20:34:58    D#:  [id number]  Job#:  [id number]

## 2020-02-18 NOTE — PROGRESS NOTES
2 RN skin check complete.   Devices in place: SCDs.  Skin assessed under devices: N/A.  Confirmed pressure ulcers found on: N/A.  New potential pressure ulcers noted on N/A. Wound consult placed N/A.  The following interventions in place: N/A     Pt has generalized scabbing on anterior bilateral lower extremities. Small skin tear on right knee with band-aid. Swelling and localized bruising on right upper extremity. Pt ambulatory.

## 2020-02-18 NOTE — PROGRESS NOTES
Telemetry Shift Summary     Rhythm SR Right BBB  HR Range 70s-80s  Ectopy rPVC, fPAC  Measurements 0.22/0.14/0.40           Normal Values  Rhythm SR  HR Range    Measurements 0.12-0.20 / 0.06-0.10  / 0.30-0.52

## 2020-02-18 NOTE — PROGRESS NOTES
2 RN skin check complete.   Devices in place: N/A  Skin assessed under devices: N\A.  Confirmed pressure ulcers found on: N/A.  New potential pressure ulcers noted on N/A. Wound consult placed N/A.  The following interventions in place: N/A

## 2020-02-18 NOTE — DISCHARGE PLANNING
Outpatient Dialysis Note    Confirmed patient is active at:    [facility]  [address] [place] [address]       Schedule: Monday, Wednesday, Friday  Time: 3:15 pm    Spoke with Kate at facility who confirmed.    Forwarded records for review.      Dawna Santa- Dialysis Coordinator  [facility] # [phone]

## 2020-02-18 NOTE — CONSULTS
"                                           Gastroenterology Consult Note     Date of Consult: 2/17/2020     Reason for consult: Severe anemia     HPI:   Patient is a 79-year-old male with past medical history significant for atrial fibrillation, congestive heart failure, chronic renal failure on intermittent hemodialysis who also has a history of recent stent placement to LAD.  Patient was on Coumadin and dual antiplatelet agents after stent placement and was admitted to the hospital about 2 weeks ago with acute drop in hemoglobin following several days of passing dark stools.  Coumadin was discontinued on that admission.  Endoscopy revealed hiatal hernia and mild esophagitis but no significant pathology to account for drop in hemoglobin.  He received blood transfusion with improvement in his hemoglobin to a baseline of over 9.  Since discharge, he has been experiencing severe fatigue and hemoglobin checked 2 days prior to admission was just over 7.  Hemoglobin that was checked today revealed further drop to 5.5.  Patient denies any dark tarry stool but stool has been brown all along.  Stool tested positive for occult blood in the emergency room.  He is currently receiving blood transfusion.  Troponin level is elevated.  Patient has a history of recurrent anemia for several years.  Colonoscopy 3 years ago was significant for 3 small polyps.  He also had capsule endoscopy 5 years ago which was negative for significant small bowel pathology.       PMHX:  Past Medical History:   Diagnosis Date   • Anesthesia     \"needs more sedation\" (can sometimes hear MD during procedure)    • Anticoagulation monitoring, special range    • Aortic stenosis     mod AS- concern for low flow severe AS with rEFof 30%   • Arterial leg ulcer (HCC) 11/8/2018   • Atrial flutter (HCC) 6/12/2019   • Basal cell carcinoma of left cheek 3/26/2015   • BPH 7/14/2009   • Bronchitis 12/25/2018   • CAD (coronary artery disease) 2/20/2014   • CATARACT  "    sanjuanita surgery complete   • CHF (congestive heart failure), NYHA class II, chronic, systolic ([facility]) E F30 in setting of atrial flutter 6/13/2019   • Chronic respiratory failure with hypoxia ([facility]) 5/8/2016   • CKD (chronic kidney disease) stage 4, GFR 15-29 ml/min ([facility]) 1/15/2010    end stage renal disease   • COPD (chronic obstructive pulmonary disease) ([facility])     wears 2.5 L o2 sometimes when he sleeps   • Detached retina    • Dialysis     m,w,f [facility]   • EMPHYSEMA    • Glaucoma     Early stage   • Gout    • Heart burn     occas   • Heart murmur     maria esther lee cardiologist   • Hypertension    • Indigestion     occas   • Kidney cyst    • Leg pain, bilateral 8/10/2015   • On supplemental oxygen therapy    • Peripheral vascular disease ([facility]) 8/10/2015   • Pneumonia    • Primary insomnia 3/22/2018   • Proteinuria    • PVD (peripheral vascular disease) ([facility])    • Sleep apnea     O2 PER CANULA  AT NIGHT 2l/m          PSurgHx:   Past Surgical History:   Procedure Laterality Date   • GASTROSCOPY-ENDO  2/7/2020    Procedure: GASTROSCOPY;  Surgeon: Jong Carrasquillo M.D.;  Location: ENDOSCOPY [facility];  Service: Gastroenterology   • STENT PLACEMENT  01/26/2020    lda   • AV FISTULA CREATION Right 8/6/2019    Procedure: CREATION, AV FISTULA -  RIGHT ARM  ,  and Ligation of Left Arm Fistula;  Surgeon: Sera Peters M.D.;  Location: SURGERY [facility];  Service: General   • CATH PLACEMENT Right 8/6/2019    Procedure: INSERTION, CATHETER - PERMA ,;  Surgeon: Sera Peters M.D.;  Location: SURGERY [facility];  Service: General   • JUSTIN  6/13/2019    Procedure: ECHOCARDIOGRAM, TRANSESOPHAGEAL;  Surgeon: Harvinder Hoang M.D.;  Location: SURGERY [facility];  Service: Cardiac   • CARDIOVERSION  6/13/2019    Procedure: CARDIOVERSION;  Surgeon: Harvinder Hoang M.D.;  Location: SURGERY [facility];  Service: Cardiac   • AV FISTULA CREATION Right 2/21/2019    Procedure: AV  FISTULA CREATION - ARM;  Surgeon: David Cason M.D.;  Location: SURGERY [facility];  Service: General   • FEMORAL ENDARTERECTOMY Left 1/25/2019    Procedure: FEMORAL ENDARTERECTOMY;  Surgeon: David Cason M.D.;  Location: SURGERY [facility];  Service: General   • FEMORAL POPLITEAL BYPASS Left 1/25/2019    Procedure: LEFT FEMORAL POPLITEAL POLYTETRAFLUOROETHYLENE ePTFE(PROPATEN VASCULAR GRAFT) BYPASS;  Surgeon: David Cason M.D.;  Location: SURGERY [facility];  Service: General   • ANGIOGRAM Left 1/25/2019    Procedure: LEFT LEG ANGIOGRAM;  Surgeon: David Cason M.D.;  Location: SURGERY [facility];  Service: General   • ENDOSCOPY PROCEDURE  3/21/2018    Procedure: ENDOSCOPY PROCEDURE/UPPER;  Surgeon: Enrique Child D.O.;  Location: SURGERY [facility];  Service: Gastroenterology   • COLONOSCOPY - ENDO  8/15/2016    Procedure: COLONOSCOPY - ENDO;  Surgeon: Mane Whatley M.D.;  Location: ENDOSCOPY [facility];  Service:    • GASTROSCOPY WITH BIOPSY  8/13/2016    Procedure: GASTROSCOPY WITH BIOPSY;  Surgeon: Jorge Leavitt M.D.;  Location: ENDOSCOPY [facility];  Service:    • RECOVERY  12/23/2015    Procedure: VASCULAR CASE-BLANKA-LEFT ARM FISTULOGRAM WITH ANGIOPLASTY;  Surgeon: Recoveryonbhavani Surgery;  [place] [facility]  Service:    • RECOVERY  3/24/2015    Performed by Recoveryonly Surgery at [place] [facility]   • RECOVERY  7/29/2014    Performed by Ir-Recovery Surgery at SURGERY SAME DAY [facility]   • RECOVERY  3/24/2014    Performed by [facility] Surgery at SURGERY [facility]   • RECOVERY  12/17/2013    Performed by [facility]overy Surgery at SURGERY SAME DAY [facility]   • RECOVERY  7/2/2013    Performed by [facility]Recovery Surgery at SURGERY SAME DAY [facility]   • AV FISTULA THROMBOLYSIS  7/2/2013    Performed by David Cason M.D. at SURGERY [facility]   • RECOVERY  1/29/2013    Performed by Ir-Recovery  Surgery at SURGERY SAME DAY [facility]   • RECOVERY  7/23/2012    Performed by SURGERY, IR-RECOVERY at SURGERY SAME DAY [facility]   • VITRECTOMY POSTERIOR  10/11/2011    Performed by NAHUM GE at SURGERY SAME DAY [facility]   • RECOVERY  8/12/2011    Performed by SURGERY, IR-RECOVERY at SURGERY SAME DAY [facility]   • VITRECTOMY POSTERIOR  1/18/2011    Performed by NAHUM GE at SURGERY SAME DAY [facility]   • SCLERAL BUCKLING  1/18/2011    Performed by NAHUM GE at SURGERY SAME DAY [facility]   • AV FISTULOGRAM  9/17/2010    Performed by ALESHIA MOSCOSO at SURGERY [facility]   • ANGIOPLASTY BALLOON  9/17/2010    Performed by ALESHIA MOSCOSO at SURGERY [facility]   • AV FISTULOGRAM  7/23/2010    Performed by ALESHIA MOSCOSO at SURGERY [facility]   • ANGIOPLASTY BALLOON  7/23/2010    Performed by ALESHIA MOSCOSO at SURGERY [facility]   • AV FISTULA REVISION  2/19/2010    Performed by WILLIE AHTCH at SURGERY [facility]   • AV FISTULA CREATION  2/12/2010    Performed by ALESHIA MOSCOSO at SURGERY [facility]   • CATARACT PHACO WITH IOL  4/8/08    Performed by STACEY PHILLIPS at SURGERY SAME DAY [facility]   • OTHER ORTHOPEDIC SURGERY  1998    right toe for facititis        ALLERGIES:Patient has no known allergies.     SocHx:   Social History     Socioeconomic History   • Marital status:      Spouse name: Not on file   • Number of children: Not on file   • Years of education: Not on file   • Highest education level: Not on file   Occupational History   • Not on file   Social Needs   • Financial resource strain: Not on file   • Food insecurity     Worry: Not on file     Inability: Not on file   • Transportation needs     Medical: Not on file     Non-medical: Not on file   Tobacco Use   • Smoking status: Former Smoker     Packs/day: 1.00     Years: 40.00     Pack years: 40.00     Types: Cigarettes     Last attempt to quit:  2009     Years since quittin.1   • Smokeless tobacco: Never Used   • Tobacco comment: 1 pk a day for 35 yrs, QUIT 2010   Substance and Sexual Activity   • Alcohol use: No     Alcohol/week: 0.0 oz   • Drug use: No   • Sexual activity: Never     Partners: Female     Comment: , two sons, 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   Lifestyle   • Physical activity     Days per week: Not on file     Minutes per session: Not on file   • Stress: Not on file   Relationships   • Social connections     Talks on phone: Not on file     Gets together: Not on file     Attends [employer] service: Not on file     Active member of club or organization: Not on file     Attends meetings of clubs or organizations: Not on file     Relationship status: Not on file   • Intimate partner violence     Fear of current or ex partner: Not on file     Emotionally abused: Not on file     Physically abused: Not on file     Forced sexual activity: Not on file   Other Topics Concern   • Not on file   Social History Narrative   • Not on file        FAMHx:   Family History   Problem Relation Age of Onset   • Stroke Mother    • Hypertension Mother    • Lung Disease Father         Emphysema, resp failure   • Genitourinary () Problems Maternal Aunt         hematuria   • Hypertension Brother         ROS:  Constitutional: No fevers, chills, positive for severe fatigue  HEENT: no vision or hearing changes, no dry mouth, no change in smell  CARDIO: no palpitations,  no chest pain  PULM: no cough, positive for exertional dyspnea  NEURO: no Seizures, no memory impairment, no change in sensation  GI: as above  : no dysuria, no hematuria  HEME:  no easy brusing  MUSCULOSKELETAL: no muscle aches, no back pain, no arthritis  PSYCH: no anxiety or depression  SKIN: no rashes     PE:  Vitals:    20 1631 20 1701 20 1703 20 1744   BP: 151/60 (!) 175/69  114/42   Pulse: 79 74  80   Resp:    19   Temp:   36.4 °C (97.6 °F) 36.3 °C (97.4  °F)   TempSrc:   Temporal Oral   SpO2: 100% 100%     Weight:       Height:         Gen: AAOx3, NAD, lying in bed  HEENT: PERRL, EOMI, nares patent, Mucous membranes moist  Neck: supple, no cervical or supraclavicular adenopathy  CVS: regular rhythm, normal rate, ejection systolic murmur  Pulm: CTAB, no crackles  Abd: soft, Nd, NT, no guarding or rebound  Ext: no edema, normal sensation  NEURO: grossly normal, no weakness  Skin: warm, no rash  Psych: normal Affect, no anxiety     LABS:  Lab Results   Component Value Date/Time    SODIUM 134 (L) 02/17/2020 02:33 PM    POTASSIUM 5.3 02/17/2020 02:33 PM    CHLORIDE 92 (L) 02/17/2020 02:33 PM    CO2 22 02/17/2020 02:33 PM    GLUCOSE 110 (H) 02/17/2020 02:33 PM     (HH) 02/17/2020 02:33 PM    CREATININE 9.41 (HH) 02/17/2020 02:33 PM    CREATININE 2.1 (H) 05/06/2009 10:08 AM      Lab Results   Component Value Date/Time    WBC 6.1 02/17/2020 02:33 PM    RBC 1.69 (L) 02/17/2020 02:33 PM    HEMOGLOBIN 5.5 (LL) 02/17/2020 02:33 PM    HEMATOCRIT 18.0 (L) 02/17/2020 02:33 PM    .7 (H) 02/17/2020 02:33 PM    MCH 32.0 02/17/2020 02:33 PM    MCHC 30.5 (L) 02/17/2020 02:33 PM    MPV 10.3 02/17/2020 02:33 PM    NEUTSPOLYS 80.10 (H) 02/17/2020 02:33 PM    LYMPHOCYTES 9.10 (L) 02/17/2020 02:33 PM    MONOCYTES 7.90 02/17/2020 02:33 PM    EOSINOPHILS 2.30 02/17/2020 02:33 PM    EOSINOPHILS 2 10/26/2019 05:55 PM    BASOPHILS 0.30 02/17/2020 02:33 PM    HYPOCHROMIA 1+ 01/17/2020 12:50 PM    ANISOCYTOSIS 1+ 02/17/2020 11:17 AM        Lab Results   Component Value Date/Time    PROTHROMBTM 16.9 (H) 02/07/2020 08:00 AM    INR 1.34 (H) 02/07/2020 08:00 AM      Recent Labs     02/17/20  1433   ASTSGOT 13   ALTSGPT 12   TBILIRUBIN 0.4   GLOBULIN 2.5       ASSESSMENT:   79-year-old male with multiple comorbidities on dual antiplatelet agents, presenting with acute on chronic anemia without clinical signs or symptoms of overt bleeding like melena or hematochezia.  Recent upper  endoscopy was negative for a significant pathology.  Since he has not had a colonoscopy in over 3 years, lower GI pathology should be excluded.  Because of recurrent anemia also will consider push enteroscopy to rule out proximal small bowel pathology.  If these are negative, consider repeat PillCam study to rule out midgut pathology.  In view of recent coronary stent placement, these procedures would like to be performed without any interruption of antiplatelet therapy.  Because of elevated troponin, will schedule procedures when cleared by cardiology.      PROBLEMS:  1.  Acute on chronic anemia  2.  Obscure GI bleeding  3.  Coagulopathy due to medications  4.  Recent coronary artery stent placement  5.  Chronic renal failure on intermittent hemodialysis     PLAN:   1.  Clear liquid diet tomorrow  2.  If cleared by cardiology, will plan push enteroscopy and colonoscopy on Wednesday or Thursday depending on his dialysis schedule.  3.  Monitor hemoglobin and transfuse to maintain hemoglobin above 7.        Thank you for this consult.

## 2020-02-18 NOTE — ASSESSMENT & PLAN NOTE
He is on dialysis Monday Wednesday Friday.  Nephrology is consulting and will order dialysis  Continue [facility]

## 2020-02-18 NOTE — DISCHARGE PLANNING
Care Transition Team Assessment     NOK: Yaquelin Castelan      [phone]  LSW met with pt and pt's wife at bedside. LSW verified demographic information and preferred pharmacy. PCP on file. AD on file.  Pt denies hx with mental health issues and substance abuse issues. Pt stated he uses a walker and cane at home but is otherwise independent with his ADLs at home. Pt stated he has an immense amount of family support from his adult children and wife. Pt plans to discharge home.       Information Source  Orientation : Oriented x 4  Information Given By: Patient  Informant's Name: Ronny Castelan  Who is responsible for making decisions for patient? : Patient    Readmission Evaluation  Is this a readmission?: No    Elopement Risk  Legal Hold: No  Ambulatory or Self Mobile in Wheelchair: No-Not an Elopement Risk  Elopement Risk: Not at Risk for Elopement    Interdisciplinary Discharge Planning  Does Admitting Nurse Feel This Could be a Complex Discharge?: No  Patient or legal guardian wants to designate a caregiver (see row info): Yes  Caregiver name: Yaquelin  Caregiver relationship to patient: Wife    Discharge Preparedness  What is your plan after discharge?: Home with help  What are your discharge supports?: Spouse, Child  Prior Functional Level: Independent with Activities of Daily Living, Uses Cane, Independent with Medication Management, Uses Walker  Difficulity with ADLs: Walking  Difficulity with IADLs: None    Functional Assesment  Prior Functional Level: Independent with Activities of Daily Living, Uses Cane, Independent with Medication Management, Uses Walker    Finances  Financial Barriers to Discharge: No  Prescription Coverage: Yes    Vision / Hearing Impairment  Vision Impairment : No  Hearing Impairment : No         Advance Directive  Advance Directive?: POLST    Domestic Abuse  Have you ever been the victim of abuse or violence?: No  Physical Abuse or Sexual Abuse: No  Verbal Abuse or Emotional Abuse:  No  Possible Abuse Reported to:: Not Applicable    Psychological Assessment  History of Substance Abuse: None  History of Psychiatric Problems: No  Non-compliant with Treatment: No  Newly Diagnosed Illness: Yes    Discharge Risks or Barriers  Discharge risks or barriers?: No    Anticipated Discharge Information  Anticipated discharge disposition: Home  Discharge Address: [address] [place] [address] [place] [address]  Discharge Contact Phone Number: [phone]

## 2020-02-18 NOTE — PROGRESS NOTES
[facility] Daily Progress Note    Date of Service  2/18/2020    Chief Complaint  79 y.o. male admitted 2/17/2020 with weakness and anemia    Hospital Course    This is a 79 y.o. male with a history of end-stage renal disease on dialysis Monday was a Friday, hypertension, hyperlipidemia, coronary disease with a stent placed 1 month ago has been on aspirin and Plavix, recent hospitalization for anemia secondary to GI bleeding with unidentified source who presented 2/17/2020 with dark stool and weakness.    About 1 week ago he was hospitalized for weakness and dark stool.  GI was consulted and did an upper endoscopy which revealed a Schatzki's ring and esophagitis however no active bleeding.  Decision was made at that time to continue aspirin and Plavix however to discontinue Coumadin.  He had routine labs drawn on presentation and revealed a hemoglobin of 5.5.     He has had GI bleeding about 5 years ago and had an outpatient capsule endoscopy which was negative.      Interval Problem Update  The patient's hemoglobin is improved after blood transfusion  Cardiology agrees to continue aspirin but stop plavix acutely  GI plans for push enteroscopy and colonoscopy this week    Consultants/Specialty  Cardiology Dr. Contreras  GI    Code Status  full    Disposition  home    Review of Systems  Review of Systems   Constitutional: Negative for chills, diaphoresis and fever.        Weakness better after blood transfusion   HENT: Negative for congestion.    Respiratory: Negative for cough, sputum production, shortness of breath and stridor.    Cardiovascular: Negative for chest pain, palpitations, orthopnea and leg swelling.   Gastrointestinal: Negative for abdominal pain, blood in stool, diarrhea, nausea and vomiting.   Genitourinary: Negative for dysuria, frequency and urgency.   Musculoskeletal: Negative for back pain and myalgias.   Skin: Negative for itching and rash.   Neurological: Negative for dizziness, tingling and  headaches.   Endo/Heme/Allergies: Does not bruise/bleed easily.   Psychiatric/Behavioral: Negative for depression. The patient does not have insomnia.    All other systems reviewed and are negative.       Physical Exam  Temp:  [36.2 °C (97.1 °F)-36.6 °C (97.9 °F)] 36.6 °C (97.9 °F)  Pulse:  [74-91] 90  Resp:  [16-20] 18  BP: (113-175)/(42-69) 133/49  SpO2:  [75 %-100 %] 96 %    Physical Exam  Vitals signs and nursing note reviewed.   Constitutional:       Appearance: He is not ill-appearing or diaphoretic.   HENT:      Nose: No congestion or rhinorrhea.      Mouth/Throat:      Mouth: Mucous membranes are moist.      Pharynx: Oropharynx is clear.   Eyes:      General: No scleral icterus.     Conjunctiva/sclera: Conjunctivae normal.   Neck:      Musculoskeletal: Neck supple.   Cardiovascular:      Rate and Rhythm: Normal rate and regular rhythm.      Heart sounds: Normal heart sounds. No murmur.   Pulmonary:      Effort: No respiratory distress.      Breath sounds: No stridor. No wheezing.   Abdominal:      General: Bowel sounds are normal. There is no distension.      Palpations: Abdomen is soft.      Tenderness: There is no abdominal tenderness.   Musculoskeletal:         General: No swelling or tenderness.   Skin:     General: Skin is dry.      Capillary Refill: Capillary refill takes less than 2 seconds.      Coloration: Skin is pale. Skin is not jaundiced.      Findings: No bruising.   Neurological:      General: No focal deficit present.      Mental Status: He is alert.      Motor: Weakness present.   Psychiatric:         Mood and Affect: Mood normal.         Thought Content: Thought content normal.         Fluids    Intake/Output Summary (Last 24 hours) at 2/18/2020 0935  Last data filed at 2/17/2020 2315  Gross per 24 hour   Intake 1008.75 ml   Output --   Net 1008.75 ml       Laboratory  Recent Labs     02/15/20  1322 02/17/20  1117 02/17/20  1433 02/18/20  0228   WBC 4.5* 5.6 6.1  --    RBC 2.44* 1.77* 1.69*   --    HEMOGLOBIN 7.7* 5.6* 5.5* 7.5*   HEMATOCRIT 25.3* 18.4* 18.0*  --    .7* 104.0* 104.7*  --    MCH 31.6 31.6 32.0  --    MCHC 30.4* 30.4* 30.5*  --    RDW 65.3* 65.2* 65.3*  --    PLATELETCT 244 222 253  --    MPV 10.1 9.7 10.3  --      Recent Labs     02/15/20  1322 02/17/20  1433 02/18/20  0228   SODIUM 139 134* 135   POTASSIUM 4.5 5.3 5.1   CHLORIDE 95* 92* 94*   CO2 27 22 19*   GLUCOSE 102* 110* 92   BUN 62* 110* 115*   CREATININE 6.29* 9.41* 9.75*   CALCIUM 10.2 10.4* 10.2                   Imaging  EC-ECHOCARDIOGRAM LTD W/O CONT              Assessment/Plan  * GI bleed  Assessment & Plan  Unclear at this point if it is upper or lower.  On 2/7/2020 he had an EGD done by Dr. anderson from [facility] which showed esophagitis and a Schatzki's ring however no active bleeding.  5 years ago he had a capsule endoscopy which he states was nondiagnostic however these records are not available.the p    The patient was Transfused 2 units for hemoglobin 5.5  Monitor hemoglobin every 8 hours for now  GI has been consulted and plans for inpatient scope tomorrow or thursday  He will need an outpatient capsule endoscopy      Severe aortic stenosis- (present on admission)  Assessment & Plan  Follows with Dr. Godoy and is considering TAVR at some future date after current situation is stabilized    Essential hypertension- (present on admission)  Assessment & Plan  Blood pressure is controlled will monitor    PAD (peripheral artery disease) ([facility])- (present on admission)  Assessment & Plan  Continue pravastatin    Dyslipidemia- (present on admission)  Assessment & Plan  Pravastatin    Coronary artery disease- (present on admission)  Assessment & Plan  He had an LAD stent placed 1 month ago, follows with Dr. Godoy as an outpatient  Continue aspirin ok to hold plavix per cardiology  Continue pravastatin, troponin elevated but stable    ESRD (end stage renal disease) ([facility])- (present on admission)  Assessment & Plan  He is on  dialysis Monday Wednesday Friday.  Nephrology is consulting and will order dialysis  Continue [facility]    Acute on chronic blood loss anemia  Assessment & Plan  Secondary to GI bleed.  Recent upper endoscopy did not reveal active bleeding  GI has been consulted  Enteroscopy and colonoscopy planned for later this week per GI  Continue iv protonix bid  Monitor hemoglobin      Pancytopenia (HCC)- (present on admission)  Assessment & Plan  Chronic, stable other than hemoglobin         VTE prophylaxis: scd's

## 2020-02-19 ENCOUNTER — HOSPITAL ENCOUNTER (INPATIENT)
Facility: MEDICAL CENTER | Age: 80
LOS: 7 days | DRG: 377 | End: 2020-02-26
Attending: INTERNAL MEDICINE | Admitting: INTERNAL MEDICINE
Payer: MEDICARE

## 2020-02-19 ENCOUNTER — ANESTHESIA (OUTPATIENT)
Dept: SURGERY | Facility: MEDICAL CENTER | Age: 80
DRG: 377 | End: 2020-02-19
Payer: MEDICARE

## 2020-02-19 ENCOUNTER — ANESTHESIA EVENT (OUTPATIENT)
Dept: SURGERY | Facility: MEDICAL CENTER | Age: 80
DRG: 377 | End: 2020-02-19
Payer: MEDICARE

## 2020-02-19 VITALS
WEIGHT: 167.55 LBS | HEIGHT: 69 IN | BODY MASS INDEX: 24.82 KG/M2 | OXYGEN SATURATION: 90 % | SYSTOLIC BLOOD PRESSURE: 104 MMHG | RESPIRATION RATE: 17 BRPM | DIASTOLIC BLOOD PRESSURE: 50 MMHG | HEART RATE: 86 BPM | TEMPERATURE: 97.9 F

## 2020-02-19 DIAGNOSIS — K92.2 GASTROINTESTINAL HEMORRHAGE, UNSPECIFIED GASTROINTESTINAL HEMORRHAGE TYPE: ICD-10-CM

## 2020-02-19 LAB
HGB BLD-MCNC: 6.4 G/DL (ref 14–18)
HGB BLD-MCNC: 7.5 G/DL (ref 14–18)
HGB BLD-MCNC: 7.9 G/DL (ref 14–18)
INR PPP: 1.08 (ref 0.87–1.13)
PROTHROMBIN TIME: 14.1 SEC (ref 12–14.6)

## 2020-02-19 PROCEDURE — 160204 HCHG ENDO MINUTES - 1ST 30 MINS LEVEL 5: Performed by: INTERNAL MEDICINE

## 2020-02-19 PROCEDURE — 700111 HCHG RX REV CODE 636 W/ 250 OVERRIDE (IP): Performed by: INTERNAL MEDICINE

## 2020-02-19 PROCEDURE — 99223 1ST HOSP IP/OBS HIGH 75: CPT | Mod: AI | Performed by: INTERNAL MEDICINE

## 2020-02-19 PROCEDURE — 700105 HCHG RX REV CODE 258: Performed by: INTERNAL MEDICINE

## 2020-02-19 PROCEDURE — 501159 HCHG PROBE, LAP: Performed by: INTERNAL MEDICINE

## 2020-02-19 PROCEDURE — 86850 RBC ANTIBODY SCREEN: CPT

## 2020-02-19 PROCEDURE — 700102 HCHG RX REV CODE 250 W/ 637 OVERRIDE(OP): Performed by: INTERNAL MEDICINE

## 2020-02-19 PROCEDURE — 86901 BLOOD TYPING SEROLOGIC RH(D): CPT

## 2020-02-19 PROCEDURE — A9270 NON-COVERED ITEM OR SERVICE: HCPCS | Performed by: INTERNAL MEDICINE

## 2020-02-19 PROCEDURE — 160209 HCHG ENDO MINUTES - EA ADDL 1 MIN LEVEL 5: Performed by: INTERNAL MEDICINE

## 2020-02-19 PROCEDURE — 36415 COLL VENOUS BLD VENIPUNCTURE: CPT

## 2020-02-19 PROCEDURE — 0W3P8ZZ CONTROL BLEEDING IN GASTROINTESTINAL TRACT, VIA NATURAL OR ARTIFICIAL OPENING ENDOSCOPIC: ICD-10-PCS | Performed by: INTERNAL MEDICINE

## 2020-02-19 PROCEDURE — 160002 HCHG RECOVERY MINUTES (STAT): Performed by: INTERNAL MEDICINE

## 2020-02-19 PROCEDURE — 700101 HCHG RX REV CODE 250: Performed by: ANESTHESIOLOGY

## 2020-02-19 PROCEDURE — 700111 HCHG RX REV CODE 636 W/ 250 OVERRIDE (IP): Performed by: ANESTHESIOLOGY

## 2020-02-19 PROCEDURE — C9113 INJ PANTOPRAZOLE SODIUM, VIA: HCPCS | Performed by: INTERNAL MEDICINE

## 2020-02-19 PROCEDURE — 770020 HCHG ROOM/CARE - TELE (206)

## 2020-02-19 PROCEDURE — 500066 HCHG BITE BLOCK, ECT: Performed by: INTERNAL MEDICINE

## 2020-02-19 PROCEDURE — 85610 PROTHROMBIN TIME: CPT

## 2020-02-19 PROCEDURE — 160048 HCHG OR STATISTICAL LEVEL 1-5: Performed by: INTERNAL MEDICINE

## 2020-02-19 PROCEDURE — 700101 HCHG RX REV CODE 250: Performed by: INTERNAL MEDICINE

## 2020-02-19 PROCEDURE — 90935 HEMODIALYSIS ONE EVALUATION: CPT | Performed by: INTERNAL MEDICINE

## 2020-02-19 PROCEDURE — 160035 HCHG PACU - 1ST 60 MINS PHASE I: Performed by: INTERNAL MEDICINE

## 2020-02-19 PROCEDURE — 85018 HEMOGLOBIN: CPT | Mod: 91

## 2020-02-19 PROCEDURE — 90935 HEMODIALYSIS ONE EVALUATION: CPT

## 2020-02-19 PROCEDURE — 160009 HCHG ANES TIME/MIN: Performed by: INTERNAL MEDICINE

## 2020-02-19 PROCEDURE — 0DJD8ZZ INSPECTION OF LOWER INTESTINAL TRACT, VIA NATURAL OR ARTIFICIAL OPENING ENDOSCOPIC: ICD-10-PCS | Performed by: INTERNAL MEDICINE

## 2020-02-19 RX ORDER — TAMSULOSIN HYDROCHLORIDE 0.4 MG/1
0.8 CAPSULE ORAL DAILY
Status: DISCONTINUED | OUTPATIENT
Start: 2020-02-20 | End: 2020-02-26 | Stop reason: HOSPADM

## 2020-02-19 RX ORDER — HYDRALAZINE HYDROCHLORIDE 20 MG/ML
5 INJECTION INTRAMUSCULAR; INTRAVENOUS
Status: DISCONTINUED | OUTPATIENT
Start: 2020-02-19 | End: 2020-02-19 | Stop reason: HOSPADM

## 2020-02-19 RX ORDER — HYDROMORPHONE HYDROCHLORIDE 1 MG/ML
0.1 INJECTION, SOLUTION INTRAMUSCULAR; INTRAVENOUS; SUBCUTANEOUS
Status: DISCONTINUED | OUTPATIENT
Start: 2020-02-19 | End: 2020-02-19 | Stop reason: HOSPADM

## 2020-02-19 RX ORDER — AMOXICILLIN 250 MG
2 CAPSULE ORAL 2 TIMES DAILY
Status: DISCONTINUED | OUTPATIENT
Start: 2020-02-20 | End: 2020-02-26 | Stop reason: HOSPADM

## 2020-02-19 RX ORDER — ROSUVASTATIN CALCIUM 10 MG/1
40 TABLET, COATED ORAL EVERY EVENING
Status: CANCELLED | OUTPATIENT
Start: 2020-02-19

## 2020-02-19 RX ORDER — KETAMINE HYDROCHLORIDE 50 MG/ML
INJECTION, SOLUTION INTRAMUSCULAR; INTRAVENOUS PRN
Status: DISCONTINUED | OUTPATIENT
Start: 2020-02-19 | End: 2020-02-19 | Stop reason: SURG

## 2020-02-19 RX ORDER — POLYETHYLENE GLYCOL 3350 17 G/17G
1 POWDER, FOR SOLUTION ORAL
Status: CANCELLED | OUTPATIENT
Start: 2020-02-19

## 2020-02-19 RX ORDER — METOPROLOL TARTRATE 1 MG/ML
1 INJECTION, SOLUTION INTRAVENOUS
Status: DISCONTINUED | OUTPATIENT
Start: 2020-02-19 | End: 2020-02-19 | Stop reason: HOSPADM

## 2020-02-19 RX ORDER — AMOXICILLIN 250 MG
2 CAPSULE ORAL 2 TIMES DAILY
Status: CANCELLED | OUTPATIENT
Start: 2020-02-19

## 2020-02-19 RX ORDER — ALBUMIN (HUMAN) 12.5 G/50ML
12.5 SOLUTION INTRAVENOUS
Status: DISCONTINUED | OUTPATIENT
Start: 2020-02-19 | End: 2020-02-26 | Stop reason: HOSPADM

## 2020-02-19 RX ORDER — BISACODYL 10 MG
10 SUPPOSITORY, RECTAL RECTAL
Status: CANCELLED | OUTPATIENT
Start: 2020-02-19

## 2020-02-19 RX ORDER — SODIUM CHLORIDE 9 MG/ML
250 INJECTION, SOLUTION INTRAVENOUS
Status: DISCONTINUED | OUTPATIENT
Start: 2020-02-19 | End: 2020-02-26 | Stop reason: HOSPADM

## 2020-02-19 RX ORDER — ROSUVASTATIN CALCIUM 20 MG/1
40 TABLET, COATED ORAL EVERY EVENING
Status: DISCONTINUED | OUTPATIENT
Start: 2020-02-20 | End: 2020-02-26 | Stop reason: HOSPADM

## 2020-02-19 RX ORDER — TAMSULOSIN HYDROCHLORIDE 0.4 MG/1
0.8 CAPSULE ORAL DAILY
Status: CANCELLED | OUTPATIENT
Start: 2020-02-20

## 2020-02-19 RX ORDER — ALBUTEROL SULFATE 90 UG/1
1-2 AEROSOL, METERED RESPIRATORY (INHALATION) EVERY 4 HOURS PRN
Status: DISCONTINUED | OUTPATIENT
Start: 2020-02-19 | End: 2020-02-26 | Stop reason: HOSPADM

## 2020-02-19 RX ORDER — ALBUMIN (HUMAN) 12.5 G/50ML
12.5 SOLUTION INTRAVENOUS
Status: CANCELLED | OUTPATIENT
Start: 2020-02-19

## 2020-02-19 RX ORDER — ALBUTEROL SULFATE 90 UG/1
1-2 AEROSOL, METERED RESPIRATORY (INHALATION) EVERY 4 HOURS PRN
Status: CANCELLED | OUTPATIENT
Start: 2020-02-19

## 2020-02-19 RX ORDER — SODIUM CHLORIDE, SODIUM LACTATE, POTASSIUM CHLORIDE, CALCIUM CHLORIDE 600; 310; 30; 20 MG/100ML; MG/100ML; MG/100ML; MG/100ML
INJECTION, SOLUTION INTRAVENOUS CONTINUOUS
Status: DISCONTINUED | OUTPATIENT
Start: 2020-02-19 | End: 2020-02-19 | Stop reason: HOSPADM

## 2020-02-19 RX ORDER — MONTELUKAST SODIUM 10 MG/1
10 TABLET ORAL EVERY EVENING
Status: CANCELLED | OUTPATIENT
Start: 2020-02-19

## 2020-02-19 RX ORDER — OXYCODONE HCL 5 MG/5 ML
10 SOLUTION, ORAL ORAL
Status: DISCONTINUED | OUTPATIENT
Start: 2020-02-19 | End: 2020-02-19 | Stop reason: HOSPADM

## 2020-02-19 RX ORDER — ONDANSETRON 2 MG/ML
4 INJECTION INTRAMUSCULAR; INTRAVENOUS EVERY 4 HOURS PRN
Status: DISCONTINUED | OUTPATIENT
Start: 2020-02-19 | End: 2020-02-26 | Stop reason: HOSPADM

## 2020-02-19 RX ORDER — PHENYLEPHRINE HYDROCHLORIDE 10 MG/ML
INJECTION, SOLUTION INTRAMUSCULAR; INTRAVENOUS; SUBCUTANEOUS PRN
Status: DISCONTINUED | OUTPATIENT
Start: 2020-02-19 | End: 2020-02-19 | Stop reason: SURG

## 2020-02-19 RX ORDER — CALCIUM ACETATE 667 MG/1
667 TABLET ORAL
Status: DISCONTINUED | OUTPATIENT
Start: 2020-02-20 | End: 2020-02-26 | Stop reason: HOSPADM

## 2020-02-19 RX ORDER — SODIUM CHLORIDE 9 MG/ML
250 INJECTION, SOLUTION INTRAVENOUS
Status: CANCELLED | OUTPATIENT
Start: 2020-02-19

## 2020-02-19 RX ORDER — HYDROMORPHONE HYDROCHLORIDE 1 MG/ML
0.2 INJECTION, SOLUTION INTRAMUSCULAR; INTRAVENOUS; SUBCUTANEOUS
Status: DISCONTINUED | OUTPATIENT
Start: 2020-02-19 | End: 2020-02-19 | Stop reason: HOSPADM

## 2020-02-19 RX ORDER — OXYCODONE HCL 5 MG/5 ML
5 SOLUTION, ORAL ORAL
Status: DISCONTINUED | OUTPATIENT
Start: 2020-02-19 | End: 2020-02-19 | Stop reason: HOSPADM

## 2020-02-19 RX ORDER — EPINEPHRINE 1 MG/ML(1)
AMPUL (ML) INJECTION PRN
Status: DISCONTINUED | OUTPATIENT
Start: 2020-02-19 | End: 2020-02-19 | Stop reason: SURG

## 2020-02-19 RX ORDER — ROPINIROLE 2 MG/1
1 TABLET, FILM COATED ORAL 3 TIMES DAILY
Status: DISCONTINUED | OUTPATIENT
Start: 2020-02-19 | End: 2020-02-20

## 2020-02-19 RX ORDER — FLUTICASONE PROPIONATE 50 MCG
1-2 SPRAY, SUSPENSION (ML) NASAL DAILY
Status: CANCELLED | OUTPATIENT
Start: 2020-02-20

## 2020-02-19 RX ORDER — ROPINIROLE 1 MG/1
1 TABLET, FILM COATED ORAL 3 TIMES DAILY
Status: CANCELLED | OUTPATIENT
Start: 2020-02-19

## 2020-02-19 RX ORDER — TRAZODONE HYDROCHLORIDE 150 MG/1
150-300 TABLET ORAL
Status: DISCONTINUED | OUTPATIENT
Start: 2020-02-19 | End: 2020-02-26 | Stop reason: HOSPADM

## 2020-02-19 RX ORDER — FLUTICASONE PROPIONATE 50 MCG
1-2 SPRAY, SUSPENSION (ML) NASAL DAILY
Status: DISCONTINUED | OUTPATIENT
Start: 2020-02-20 | End: 2020-02-26 | Stop reason: HOSPADM

## 2020-02-19 RX ORDER — BISACODYL 10 MG
10 SUPPOSITORY, RECTAL RECTAL
Status: DISCONTINUED | OUTPATIENT
Start: 2020-02-19 | End: 2020-02-26 | Stop reason: HOSPADM

## 2020-02-19 RX ORDER — TRAZODONE HYDROCHLORIDE 50 MG/1
150-300 TABLET ORAL
Status: CANCELLED | OUTPATIENT
Start: 2020-02-19

## 2020-02-19 RX ORDER — POLYETHYLENE GLYCOL 3350 17 G/17G
1 POWDER, FOR SOLUTION ORAL
Status: DISCONTINUED | OUTPATIENT
Start: 2020-02-19 | End: 2020-02-26 | Stop reason: HOSPADM

## 2020-02-19 RX ORDER — LEVALBUTEROL INHALATION SOLUTION 1.25 MG/3ML
1.25 SOLUTION RESPIRATORY (INHALATION) EVERY 4 HOURS PRN
Status: CANCELLED | OUTPATIENT
Start: 2020-02-19

## 2020-02-19 RX ORDER — FLUTICASONE PROPIONATE 44 UG/1
2 AEROSOL, METERED RESPIRATORY (INHALATION) DAILY
Status: CANCELLED | OUTPATIENT
Start: 2020-02-20

## 2020-02-19 RX ORDER — FLUTICASONE PROPIONATE 44 UG/1
2 AEROSOL, METERED RESPIRATORY (INHALATION) DAILY
Status: DISCONTINUED | OUTPATIENT
Start: 2020-02-20 | End: 2020-02-26 | Stop reason: HOSPADM

## 2020-02-19 RX ORDER — ASPIRIN 81 MG/1
81 TABLET, CHEWABLE ORAL DAILY
Status: CANCELLED | OUTPATIENT
Start: 2020-02-20

## 2020-02-19 RX ORDER — HYDROMORPHONE HYDROCHLORIDE 1 MG/ML
0.4 INJECTION, SOLUTION INTRAMUSCULAR; INTRAVENOUS; SUBCUTANEOUS
Status: DISCONTINUED | OUTPATIENT
Start: 2020-02-19 | End: 2020-02-19 | Stop reason: HOSPADM

## 2020-02-19 RX ORDER — ASPIRIN 81 MG/1
81 TABLET, CHEWABLE ORAL DAILY
Status: DISCONTINUED | OUTPATIENT
Start: 2020-02-20 | End: 2020-02-26 | Stop reason: HOSPADM

## 2020-02-19 RX ORDER — ONDANSETRON 4 MG/1
4 TABLET, ORALLY DISINTEGRATING ORAL EVERY 4 HOURS PRN
Status: DISCONTINUED | OUTPATIENT
Start: 2020-02-19 | End: 2020-02-26 | Stop reason: HOSPADM

## 2020-02-19 RX ORDER — MONTELUKAST SODIUM 10 MG/1
10 TABLET ORAL EVERY EVENING
Status: DISCONTINUED | OUTPATIENT
Start: 2020-02-20 | End: 2020-02-26 | Stop reason: HOSPADM

## 2020-02-19 RX ORDER — LEVALBUTEROL INHALATION SOLUTION 1.25 MG/3ML
1.25 SOLUTION RESPIRATORY (INHALATION) EVERY 4 HOURS PRN
Status: DISCONTINUED | OUTPATIENT
Start: 2020-02-19 | End: 2020-02-26 | Stop reason: HOSPADM

## 2020-02-19 RX ORDER — PANTOPRAZOLE SODIUM 40 MG/10ML
40 INJECTION, POWDER, LYOPHILIZED, FOR SOLUTION INTRAVENOUS 2 TIMES DAILY
Status: DISCONTINUED | OUTPATIENT
Start: 2020-02-20 | End: 2020-02-26 | Stop reason: HOSPADM

## 2020-02-19 RX ORDER — SODIUM CHLORIDE 9 MG/ML
INJECTION, SOLUTION INTRAVENOUS CONTINUOUS
Status: DISPENSED | OUTPATIENT
Start: 2020-02-19 | End: 2020-02-20

## 2020-02-19 RX ORDER — PANTOPRAZOLE SODIUM 40 MG/10ML
40 INJECTION, POWDER, LYOPHILIZED, FOR SOLUTION INTRAVENOUS 2 TIMES DAILY
Status: CANCELLED | OUTPATIENT
Start: 2020-02-19

## 2020-02-19 RX ORDER — ONDANSETRON 4 MG/1
4 TABLET, ORALLY DISINTEGRATING ORAL EVERY 4 HOURS PRN
Status: CANCELLED | OUTPATIENT
Start: 2020-02-19

## 2020-02-19 RX ORDER — DEXAMETHASONE SODIUM PHOSPHATE 4 MG/ML
INJECTION, SOLUTION INTRA-ARTICULAR; INTRALESIONAL; INTRAMUSCULAR; INTRAVENOUS; SOFT TISSUE PRN
Status: DISCONTINUED | OUTPATIENT
Start: 2020-02-19 | End: 2020-02-19 | Stop reason: SURG

## 2020-02-19 RX ORDER — MEPERIDINE HYDROCHLORIDE 25 MG/ML
12.5 INJECTION INTRAMUSCULAR; INTRAVENOUS; SUBCUTANEOUS
Status: DISCONTINUED | OUTPATIENT
Start: 2020-02-19 | End: 2020-02-19 | Stop reason: HOSPADM

## 2020-02-19 RX ORDER — ONDANSETRON 2 MG/ML
4 INJECTION INTRAMUSCULAR; INTRAVENOUS EVERY 4 HOURS PRN
Status: CANCELLED | OUTPATIENT
Start: 2020-02-19

## 2020-02-19 RX ADMIN — ASPIRIN 81 MG CHEWABLE TABLET 81 MG: 81 TABLET CHEWABLE at 10:02

## 2020-02-19 RX ADMIN — UMECLIDINIUM BROMIDE AND VILANTEROL TRIFENATATE 1 PUFF: 62.5; 25 POWDER RESPIRATORY (INHALATION) at 09:50

## 2020-02-19 RX ADMIN — PHENYLEPHRINE HYDROCHLORIDE 100 MCG: 10 INJECTION INTRAVENOUS at 07:48

## 2020-02-19 RX ADMIN — EPINEPHRINE 6 MCG: 1 INJECTION, SOLUTION, CONCENTRATE INTRAVENOUS at 07:41

## 2020-02-19 RX ADMIN — FENTANYL CITRATE 25 MCG: 50 INJECTION, SOLUTION INTRAMUSCULAR; INTRAVENOUS at 07:19

## 2020-02-19 RX ADMIN — CALCIUM ACETATE 667 MG: 667 CAPSULE ORAL at 10:04

## 2020-02-19 RX ADMIN — MONTELUKAST 10 MG: 10 TABLET, FILM COATED ORAL at 18:06

## 2020-02-19 RX ADMIN — FLUTICASONE PROPIONATE 50 MCG: 50 SPRAY, METERED NASAL at 09:48

## 2020-02-19 RX ADMIN — ERYTHROPOIETIN 10000 UNITS: 10000 INJECTION, SOLUTION INTRAVENOUS; SUBCUTANEOUS at 16:18

## 2020-02-19 RX ADMIN — LIDOCAINE HYDROCHLORIDE 1 ML: 10 INJECTION, SOLUTION INFILTRATION; PERINEURAL at 14:36

## 2020-02-19 RX ADMIN — ROPINIROLE HYDROCHLORIDE 1 MG: 0.5 TABLET, FILM COATED ORAL at 09:30

## 2020-02-19 RX ADMIN — TAMSULOSIN HYDROCHLORIDE 0.8 MG: 0.4 CAPSULE ORAL at 09:31

## 2020-02-19 RX ADMIN — CALCIUM ACETATE 667 MG: 667 CAPSULE ORAL at 18:06

## 2020-02-19 RX ADMIN — ONDANSETRON 4 MG: 2 INJECTION INTRAMUSCULAR; INTRAVENOUS at 07:19

## 2020-02-19 RX ADMIN — PHENYLEPHRINE HYDROCHLORIDE 100 MCG: 10 INJECTION INTRAVENOUS at 07:41

## 2020-02-19 RX ADMIN — KETAMINE HYDROCHLORIDE 25 MG: 50 INJECTION, SOLUTION, CONCENTRATE INTRAMUSCULAR; INTRAVENOUS at 07:19

## 2020-02-19 RX ADMIN — PANTOPRAZOLE SODIUM 40 MG: 40 INJECTION, POWDER, LYOPHILIZED, FOR SOLUTION INTRAVENOUS at 18:06

## 2020-02-19 RX ADMIN — PANTOPRAZOLE SODIUM 40 MG: 40 INJECTION, POWDER, LYOPHILIZED, FOR SOLUTION INTRAVENOUS at 09:30

## 2020-02-19 RX ADMIN — LIDOCAINE HYDROCHLORIDE 40 MG: 20 INJECTION, SOLUTION INFILTRATION; PERINEURAL at 07:19

## 2020-02-19 RX ADMIN — DEXAMETHASONE SODIUM PHOSPHATE 4 MG: 4 INJECTION, SOLUTION INTRAMUSCULAR; INTRAVENOUS at 07:19

## 2020-02-19 RX ADMIN — PROPOFOL 210 MCG/KG/MIN: 10 INJECTION, EMULSION INTRAVENOUS at 07:19

## 2020-02-19 RX ADMIN — EPINEPHRINE 6 MCG: 1 INJECTION, SOLUTION, CONCENTRATE INTRAVENOUS at 07:48

## 2020-02-19 RX ADMIN — GLYCOPYRROLATE 0.2 MG: 0.2 INJECTION INTRAMUSCULAR; INTRAVENOUS at 07:19

## 2020-02-19 RX ADMIN — MIDAZOLAM HYDROCHLORIDE 1 MG: 1 INJECTION, SOLUTION INTRAMUSCULAR; INTRAVENOUS at 07:19

## 2020-02-19 RX ADMIN — PROPOFOL 30 MG: 10 INJECTION, EMULSION INTRAVENOUS at 07:19

## 2020-02-19 RX ADMIN — ROSUVASTATIN CALCIUM 40 MG: 10 TABLET, FILM COATED ORAL at 18:06

## 2020-02-19 RX ADMIN — SODIUM CHLORIDE: 9 INJECTION, SOLUTION INTRAVENOUS at 06:55

## 2020-02-19 ASSESSMENT — ENCOUNTER SYMPTOMS
SHORTNESS OF BREATH: 0
BRUISES/BLEEDS EASILY: 0
HEADACHES: 0
DIARRHEA: 0
INSOMNIA: 0
NAUSEA: 0
DIZZINESS: 0
ORTHOPNEA: 0
SORE THROAT: 0
PALPITATIONS: 0
STRIDOR: 0
WHEEZING: 0
CONSTIPATION: 0
FEVER: 0
NERVOUS/ANXIOUS: 0
TINGLING: 0
COUGH: 0
DIAPHORESIS: 0
NECK PAIN: 0
CHILLS: 0
CLAUDICATION: 0
BACK PAIN: 0
DEPRESSION: 0
MYALGIAS: 0
TREMORS: 0
ABDOMINAL PAIN: 0

## 2020-02-19 ASSESSMENT — PAIN SCALES - GENERAL: PAIN_LEVEL: 0

## 2020-02-19 NOTE — PROCEDURES
Procedure: Colonoscopy    Date of procedure:  2/19/2020    Enodscopist: Gucci Westbrook M.D.    Anesthesia: MAC    Anesthesiologist:Antolin Hart M.D.    Pre-op diagnosis:  Obscure GI bleeding    Post-op diagnosis:  Sigmoid diverticulosis    Complications: None    Estimated blood loss: None from the procedure    Indications:  79-year-old male with recurrent GI bleeding and severe anemia.      Description of procedure:  After discussion of indications,risks and benefits including the risks of perforation and bleeding informed consent was signed by the patient. Sedation was administered and documented by anesthesia. Time out was performed.Patient was placed in the left lateral position. Digital rectal examination revealed no perianal lesions and no palpable anorectal pathology. Olympus Video colonoscope was placed in the rectum and was passed under direct visualization through to the floor of the cecum identified by appendiceal orifice and ileocecal valve. Upon careful withdrawal of the colonoscope entire mucosa was carefully examined. The anal verge was examined by retroflexion. After deflating the colon, the scope was removed and procedure was terminated. Findings and maneuvers as listed below.Photodocumentation of cecal landmarks and anal verge obtained. Patient tolerated procedure well.    Findings:    Bowel Preparation:  Bowel preparation was suboptimal particularly in the proximal transverse and right colon.  There is dark stool coating the entire mucosa obscuring visualization.  Using water irrigation mucosa was examined as much as possible.    Rectum and anal verge:  Normal    Sigmoid colon:  Scattered medium sized diverticuli noted.    Descending colon:   Normal mucosa without significant pathology    Transverse colon:   Limited visualization revealed no polyps.  Examination suboptimal to exclude flat lesions like angiodysplasia.    Ascending colon and cecum:  Limited visualization revealed no polyps.   Examination suboptimal to exclude flat lesions like angiodysplasia.    Terminal ileum:  Dark stool present.      Plan:  No significant pathology identified in this limited examination.  Schedule capsule endoscopy  Based on clinical course and PillCam study, he may require repeat colonoscopy after an extended bowel prep.

## 2020-02-19 NOTE — DISCHARGE PLANNING
Received Transport Form @ [id number] ( WILL CALL)   Spoke to Kim @ [facility]    Transport is scheduled for 2/19 @1800 going to [facility] [id number].     Ritika AN notified of transport time.

## 2020-02-19 NOTE — PROCEDURES
Procedure: Push enteroscopy    Date of procedure: 2/19/2020    Enodscopist: Gucci Westbrook M.D.    Anesthesia: MAC    Anesthesiologist:Antolin Hart M.D.    Pre-op diagnosis:  Obscure GI bleeding    Post-op diagnosis:  Gastric AVM status post ablation  Hiatal hernia    Complications: None    Estimated blood loss: None from the procedure    Indications:  79-year-old male on dual antiplatelet treatment and Coumadin presenting with recurrent GI bleeding and severe anemia.  Previous EGD was negative for significant upper GI pathology.  Therefore push enteroscopy is being performed to evaluate proximal small bowel.      Description of procedure:  After discussion of indications,risks and benefits including the risks of perforation and bleeding informed consent was signed by the patient. Sedation was administered and documented by anesthesia. Time out was performed.Patient was placed in the left lateral position and Olympus Video pediatric colonoscope was placed in the oral cavity and the esophagus was intubated under direct visualization. The endoscope was then advanced through the lumen of the esophagus into the stomach and passed through to the third proximal jejunum, about 30 cm beyond the ligament of Treitz.  Because of acute angulation of small bowel, pediatric colonoscope could not be maneuvered beyond this.  Upon careful withdrawal of the scope, mucosa  was visualized carefully. Gastric cardia and fundus visualized by retroflexion. Findings and maneuvers as listed below. Hemostasis was confirmed from biopsy sites. After deflating the stomach the endoscope was removed and procedure was terminated. Patient tolerated procedure well.    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  Less than 2 cm hiatal hernia present.    Stomach:  2 mm angiodysplastic lesion in the proximal body of the stomach ablated using APC.  No bleeding  encountered during and at the end of procedure.  Multiple small diminutive hyperplastic appearing polyps noted in the body which were not removed.  Patchy areas of erythema noted in the antrum proximal body and fundus of the stomach.    Duodenum:   Normal mucosa.  Area around the major ampulla appeared normal.    Jejunum:  Proximal jejunum was examined for about 30 cm distal to ligament of Treitz.  No significant pathology identified.      Plan:  1.  Colonoscopy to follow this procedure  2.  If no lower GI pathology identified, schedule capsule endoscopy.

## 2020-02-19 NOTE — CONSULTS
"Nephrology Consultation    Date of Service  2/18/2020    Referring Physician  Urvashi Joy M.D.    Consulting Physician  Jerry Slater M.D.    Reason for Consultation  Management of ESRD on HD      History of Presenting Illness  79 y.o. male with ESRD on HD Monday Wednesday Friday at [facility] who presented 2/17/2020 with low hemoglobin.    The patient was previously admitted with GI bleed and had EGD with no acute bleeding. He was being followed as an outpatient and routine blood draw showed hgb of 5.5. He was advised to come to the hospital. He denies any known overt bleeding on this admission or just prior to this admission.    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 He has been on HD for about 7 years. He is essentially anuric, some days doesn't urinate at all. He dialyzes via right BC AVF and has had no issues with cannulation at new HD unit at [facility].     Review of Systems  Review of Systems   Constitutional: Negative for fever.   Respiratory: Negative for shortness of breath.    Cardiovascular: Negative for chest pain.   Gastrointestinal: Negative for abdominal pain.   All other systems reviewed and are negative.      Past Medical History  Past Medical History:   Diagnosis Date   • Anesthesia     \"needs more sedation\" (can sometimes hear MD during procedure)    • Anticoagulation monitoring, special range    • Aortic stenosis     mod AS- concern for low flow severe AS with rEFof 30%   • Arterial leg ulcer ([facility]) 11/8/2018   • Atrial flutter ([facility]) 6/12/2019   • Basal cell carcinoma of left cheek 3/26/2015   • BPH 7/14/2009   • Bronchitis 12/25/2018   • CAD (coronary artery disease) 2/20/2014   • CATARACT     sanjuanita surgery complete   • CHF (congestive heart failure), NYHA class II, chronic, systolic ([facility]) E F30 in setting of atrial flutter 6/13/2019   • Chronic respiratory failure with hypoxia ([facility]) 5/8/2016   • CKD (chronic kidney disease) stage 4, GFR 15-29 ml/min ([facility]) 1/15/2010    end stage renal disease "   • COPD (chronic obstructive pulmonary disease) ([facility])     wears 2.5 L o2 sometimes when he sleeps   • Detached retina    • Dialysis     m,w,f [facility]   • EMPHYSEMA    • Glaucoma     Early stage   • Gout    • Heart burn     occas   • Heart murmur     maria esther lee cardiologist   • Hypertension    • Indigestion     occas   • Kidney cyst    • Leg pain, bilateral 8/10/2015   • On supplemental oxygen therapy    • Peripheral vascular disease ([facility]) 8/10/2015   • Pneumonia    • Primary insomnia 3/22/2018   • Proteinuria    • PVD (peripheral vascular disease) ([facility])    • Sleep apnea     O2 PER CANULA  AT NIGHT 2l/m       Surgical History  Past Surgical History:   Procedure Laterality Date   • GASTROSCOPY-ENDO  2/7/2020    Procedure: GASTROSCOPY;  Surgeon: Jong Carrasquillo M.D.;  Location: ENDOSCOPY [facility];  Service: Gastroenterology   • STENT PLACEMENT  01/26/2020    lda   • AV FISTULA CREATION Right 8/6/2019    Procedure: CREATION, AV FISTULA -  RIGHT ARM  ,  and Ligation of Left Arm Fistula;  Surgeon: Sera Peters M.D.;  Location: SURGERY [facility];  Service: General   • CATH PLACEMENT Right 8/6/2019    Procedure: INSERTION, CATHETER - PERMA ,;  Surgeon: Sera Peters M.D.;  Location: SURGERY [facility];  Service: General   • JUSTIN  6/13/2019    Procedure: ECHOCARDIOGRAM, TRANSESOPHAGEAL;  Surgeon: Harvinder Hoang M.D.;  Location: SURGERY [facility];  Service: Cardiac   • CARDIOVERSION  6/13/2019    Procedure: CARDIOVERSION;  Surgeon: Harvindre Hoang M.D.;  Location: SURGERY [facility];  Service: Cardiac   • AV FISTULA CREATION Right 2/21/2019    Procedure: AV FISTULA CREATION - ARM;  Surgeon: David Cason M.D.;  Location: SURGERY [facility];  Service: General   • FEMORAL ENDARTERECTOMY Left 1/25/2019    Procedure: FEMORAL ENDARTERECTOMY;  Surgeon: David Cason M.D.;  Location: SURGERY [facility];  Service: General   • FEMORAL POPLITEAL  BYPASS Left 1/25/2019    Procedure: LEFT FEMORAL POPLITEAL POLYTETRAFLUOROETHYLENE ePTFE(PROPATEN VASCULAR GRAFT) BYPASS;  Surgeon: David Cason M.D.;  Location: SURGERY [facility];  Service: General   • ANGIOGRAM Left 1/25/2019    Procedure: LEFT LEG ANGIOGRAM;  Surgeon: David Cason M.D.;  Location: SURGERY [facility];  Service: General   • ENDOSCOPY PROCEDURE  3/21/2018    Procedure: ENDOSCOPY PROCEDURE/UPPER;  Surgeon: Enrique Child D.O.;  Location: [facility];  Service: Gastroenterology   • COLONOSCOPY - ENDO  8/15/2016    Procedure: COLONOSCOPY - ENDO;  Surgeon: Mane Whatley M.D.;  Location: [facility];  Service:    • GASTROSCOPY WITH BIOPSY  8/13/2016    Procedure: GASTROSCOPY WITH BIOPSY;  Surgeon: Jorge Leavitt M.D.;  Location: [facility];  Service:    • RECOVERY  12/23/2015    Procedure: VASCULAR CASE-CASON-LEFT ARM FISTULOGRAM WITH ANGIOPLASTY;  Surgeon: Recoveryonly Surgery;  Location: SURGERY [facility];  Service:    • RECOVERY  3/24/2015    Performed by [facility] Surgery at SURGERY [facility]   • RECOVERY  7/29/2014    Performed by Ir[facility]Recovery Surgery at SURGERY SAME DAY [facility]   • RECOVERY  3/24/2014    Performed by [facility] at SURGERY [facility]   • RECOVERY  12/17/2013    Performed by [facility] Surgery at SURGERY [facility]   • RECOVERY  7/2/2013    Performed by [facility]Rec[facility] Surgery at SURGERY SAME DAY [facility]   • AV FISTULA THROMBOLYSIS  7/2/2013    Performed by David Cason M.D. at SURGERY [facility]   • RECOVERY  1/29/2013    Performed by Ir[facility]Recovery Surgery at SURGERY [facility]   • RECOVERY  7/23/2012    Performed by SURGERY, IR-RECOVERY at SURGERY SAME DAY [facility]   • VITRECTOMY POSTERIOR  10/11/2011    Performed by NAHUM GE at SURGERY SAME DAY [facility]   • RECOVERY  8/12/2011    Performed by SURGERY, IR-RECOVERY at  SURGERY SAME DAY [facility]   • VITRECTOMY POSTERIOR  2011    Performed by NAHUM GE at SURGERY SAME DAY [facility]   • SCLERAL BUCKLING  2011    Performed by NAHUM GE at SURGERY SAME DAY [facility]S   • AV FISTULOGRAM  2010    Performed by ALESHIA MOSCOSO at SURGERY [facility] ORS   • ANGIOPLASTY BALLOON  2010    Performed by ALESHIA MOSCOSO at SURGERY [facility]   • AV FISTULOGRAM  2010    Performed by ALESHIA MOSCOSO at SURGERY [facility]   • ANGIOPLASTY BALLOON  2010    Performed by ALESHIA MOSCOSO at SURGERY [facility]   • AV FISTULA REVISION  2010    Performed by WILLIE HATCH at SURGERY [facility]S   • AV FISTULA CREATION  2010    Performed by ALESHIA MOSCOSO at SURGERY [facility]   • CATARACT PHACO WITH IOL  08    Performed by STACEY PHILLIPS at SURGERY SAME DAY [facility]   • OTHER ORTHOPEDIC SURGERY  1998    right toe for facititis       Family History  Family History   Problem Relation Age of Onset   • Stroke Mother    • Hypertension Mother    • Lung Disease Father         Emphysema, resp failure   • Genitourinary () Problems Maternal Aunt         hematuria   • Hypertension Brother    Brother with ADPKD?       Social History  Social History     Socioeconomic History   • Marital status:      Spouse name: Not on file   • Number of children: Not on file   • Years of education: Not on file   • Highest education level: Not on file   Occupational History   • Not on file   Social Needs   • Financial resource strain: Not on file   • Food insecurity     Worry: Not on file     Inability: Not on file   • Transportation needs     Medical: Not on file     Non-medical: Not on file   Tobacco Use   • Smoking status: Former Smoker     Packs/day: 1.00     Years: 40.00     Pack years: 40.00     Types: Cigarettes     Last attempt to quit: 2009     Years since quittin.1   • Smokeless tobacco:  Never Used   • Tobacco comment: 1 pk a day for 35 yrs, QUIT JAN 1 2010   Substance and Sexual Activity   • Alcohol use: No     Alcohol/week: 0.0 oz   • Drug use: No   • Sexual activity: Never     Partners: Female     Comment: , two sons, 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   Lifestyle   • Physical activity     Days per week: Not on file     Minutes per session: Not on file   • Stress: Not on file   Relationships   • Social connections     Talks on phone: Not on file     Gets together: Not on file     Attends Orthodox service: Not on file     Active member of club or organization: Not on file     Attends meetings of clubs or organizations: Not on file     Relationship status: Not on file   • Intimate partner violence     Fear of current or ex partner: Not on file     Emotionally abused: Not on file     Physically abused: Not on file     Forced sexual activity: Not on file   Other Topics Concern   • Not on file   Social History Narrative   • Not on file       Medications  Current Facility-Administered Medications   Medication Dose Route Frequency Provider Last Rate Last Dose   • NS (BOLUS) infusion 250 mL  250 mL Intravenous DIALYSIS PRN Jerry Slater M.D.       • albumin human 25% solution 12.5 g  12.5 g Intravenous DIALYSIS PRN Jerry Slater M.D.       • lidocaine (XYLOCAINE) 1 % injection 1 mL  1 mL Intradermal PRN Jerry Slater M.D.       • albuterol inhaler 1-2 Puff  1-2 Puff Inhalation Q4HRS PRN Katina Patterson D.O.       • aspirin (ASA) chewable tab 81 mg  81 mg Oral DAILY MEGHAN Tomlinson.O.   81 mg at 02/18/20 0528   • calcium acetate (PHOS-LO) capsule 667 mg  667 mg Oral TID AC Katina Patterson D.O.   667 mg at 02/18/20 1156   • fluticasone (FLONASE) nasal spray  mcg  1-2 Spray Nasal DAILY MEGHAN Tomlinson.O.   1 Lennox at 02/18/20 0525   • umeclidinium-vilanterol (ANORO ELLIPTA) inhaler 1 Puff  1 Puff Inhalation DAILY ANU TomlinsonO.   1 Puff at 02/18/20 0527   • levalbuterol (XOPENEX) 1.25  MG/3ML nebulizer solution 1.25 mg  1.25 mg Nebulization Q4HRS PRN ANU TomlinsonOLise   1.25 mg at 02/17/20 1822   • montelukast (SINGULAIR) tablet 10 mg  10 mg Oral Q EVENING ANU TomlinsonOLise   10 mg at 02/17/20 1948   • ROPINIRole (REQUIP) tablet 1 mg  1 mg Oral TID ANU TomlinsonO.   1 mg at 02/18/20 1156   • tamsulosin (FLOMAX) capsule 0.8 mg  0.8 mg Oral DAILY ANU TomlinsonOLise   0.8 mg at 02/18/20 0525   • traZODone (DESYREL) tablet 150-300 mg  150-300 mg Oral QHS ANU TomlinsonO.   150 mg at 02/18/20 0150   • senna-docusate (PERICOLACE or SENOKOT S) 8.6-50 MG per tablet 2 Tab  2 Tab Oral BID Katina Patterson D.O.        And   • polyethylene glycol/lytes (MIRALAX) PACKET 1 Packet  1 Packet Oral QDAY PRN Katina Patterson D.O.        And   • magnesium hydroxide (MILK OF MAGNESIA) suspension 30 mL  30 mL Oral QDAY PRN Katina Patterson D.O.        And   • bisacodyl (DULCOLAX) suppository 10 mg  10 mg Rectal QDAY PRN ANU TomlinsonO.       • ondansetron (ZOFRAN) syringe/vial injection 4 mg  4 mg Intravenous Q4HRS PRN ANU TomlinsonO.       • ondansetron (ZOFRAN ODT) dispertab 4 mg  4 mg Oral Q4HRS PRN ANU TomlinsonO.       • fluticasone (FLOVENT HFA) 44 MCG/ACT inhaler 88 mcg  2 Puff Inhalation DAILY ANU TomlinsonO.   88 mcg at 02/18/20 0527   • pantoprazole (PROTONIX) injection 40 mg  40 mg Intravenous BID Gucci Westbrook M.D.   40 mg at 02/18/20 0522   • rosuvastatin (CRESTOR) tablet 40 mg  40 mg Oral Q EVENING Deniz Contreras M.D.   40 mg at 02/17/20 1298       Allergies  No Known Allergies    Physical Exam  Temp:  [36.3 °C (97.4 °F)-36.8 °C (98.2 °F)] 36.8 °C (98.2 °F)  Pulse:  [80-95] 86  Resp:  [17-20] 17  BP: (114-149)/(42-61) 149/55  SpO2:  [95 %-100 %] 98 %    Physical Exam   Constitutional: He is oriented to person, place, and time. He appears well-developed. No distress.   HENT:   Mouth/Throat: Oropharynx is clear and moist. No oropharyngeal  exudate.   Eyes: EOM are normal. No scleral icterus.   Neck: No tracheal deviation present.   Cardiovascular: Normal rate.   Murmur heard.  Pulmonary/Chest: Effort normal and breath sounds normal. No stridor. He has no rales.   Abdominal: Soft. He exhibits no distension. There is no abdominal tenderness.   Musculoskeletal: Normal range of motion.         General: Edema (trace bilat LE) present.   Neurological: He is alert and oriented to person, place, and time.   Skin: Skin is warm and dry. He is not diaphoretic.   Psychiatric: He has a normal mood and affect.   Access: R BC AVF, +bruit and thrill    Fluids  Date 02/18/20 0700 - 02/19/20 0659   Shift 3947-7900 3511-5178 8295-6448 24 Hour Total   INTAKE   P.O. 240   240   Dialysis 500   500   Shift Total 740   740   OUTPUT   Dialysis 2500   2500   Shift Total 2500   2500   Weight (kg) 73.6 73.6 73.6 73.6       Laboratory  Labs reviewed, pertinent labs below.  Recent Labs     02/17/20  1117 02/17/20  1433 02/18/20  0228 02/18/20  1025   WBC 5.6 6.1  --   --    RBC 1.77* 1.69*  --   --    HEMOGLOBIN 5.6* 5.5* 7.5* 7.3*   HEMATOCRIT 18.4* 18.0*  --   --    .0* 104.7*  --   --    MCH 31.6 32.0  --   --    MCHC 30.4* 30.5*  --   --    RDW 65.2* 65.3*  --   --    PLATELETCT 222 253  --   --    MPV 9.7 10.3  --   --      Recent Labs     02/17/20  1433 02/18/20 0228   SODIUM 134* 135   POTASSIUM 5.3 5.1   CHLORIDE 92* 94*   CO2 22 19*   GLUCOSE 110* 92   * 115*   CREATININE 9.41* 9.75*   CALCIUM 10.4* 10.2                URINALYSIS:  Lab Results   Component Value Date/Time    COLORURINE Yellow 03/19/2018 2009    CLARITY Sl Cloudy (A) 03/19/2018 2009    SPECGRAVITY 1.010 03/19/2018 2009    PHURINE 8.5 (A) 03/19/2018 2009    KETONES Negative 03/19/2018 2009    PROTEINURIN >=300 (A) 03/19/2018 2009    BILIRUBINUR Negative 03/19/2018 2009    NITRITE Negative 03/19/2018 2009    LEUKESTERAS Negative 03/19/2018 2009    OCCULTBLOOD Small (A) 03/19/2018 2009      [facility]  Lab Results   Component Value Date/Time    TOTPROTUR 774.0 (H) 01/09/2012 1644      Lab Results   Component Value Date/Time    CREATININEU 141.57 01/09/2012 1644       Imaging reviewed  EC-ECHOCARDIOGRAM LTD W/O CONT   Final Result            Assessment/Plan  79 y.o. male with ESRD on HD Monday Wednesday Friday at [facility] who presented 2/17/2020 with low hemoglobin.    1. ESRD on HD Monday Wednesday Friday. Plan for HD today given missed HD yesterday. Next HD likely tomorrow. Patient essentially anuric.    2. Access: R BC AVF, stable. Avoid IV's, BP checks, and lab draws in right arm.    3. Acute blood loss anemia, unclear source. GI planning on push enteroscopy tomorrow. Check CBC daily.    4. Anemia of CKD. Order epogen 6000 units thrice weekly with HD.     5. Hyperkalemia, mild on admission, due to missed HD. This should improve with HD. Low K diet.     6. HTN, controlled. Continue UF with HD as tolerated.     Following    Jerry Slater MD  Nephrology

## 2020-02-19 NOTE — DISCHARGE PLANNING
LIBBYW called transfer center and was informed there are currently no avaliable beds for pt. Pt on wait list. LIBBYW sent transport paperwork to [facility] for standby status.

## 2020-02-19 NOTE — CARE PLAN
Problem: Safety  Goal: Will remain free from falls  Outcome: PROGRESSING AS EXPECTED     Problem: Respiratory:  Goal: Respiratory status will improve  Outcome: MET     Problem: Pain Management  Goal: Pain level will decrease to patient's comfort goal  Outcome: MET     Problem: Venous Thromboembolism (VTW)/Deep Vein Thrombosis (DVT) Prevention:  Goal: Patient will participate in Venous Thrombosis (VTE)/Deep Vein Thrombosis (DVT)Prevention Measures  Note: ON HOLD for possible GI bleed     Problem: Discharge Barriers/Planning  Goal: Patient's continuum of care needs will be met  Note: EGD/colo today

## 2020-02-19 NOTE — ANESTHESIA QCDR
2019 [facility] Clinical Data Registry (for Quality Improvement)     Postoperative nausea/vomiting risk protocol (Adult = 18 yrs and Pediatric 3-17 yrs)- (430 and 463)  General inhalation anesthetic (NOT TIVA) with PONV risk factors: No  Provision of anti-emetic therapy with at least 2 different classes of agents: N/A  Patient DID NOT receive anti-emetic therapy and reason is documented in Medical Record: N/A    Multimodal Pain Management- (477)  Non-emergent surgery AND patient age >= 18: Yes  Use of Multimodal Pain Management, two or more drugs and/or interventions, NOT including systemic opioids: No  Exception: Documented allergy to multiple classes of analgesics: No       Smoking Abstinence (404)  Patient is current smoker (cigarette, pipe, e-cig, marijuanna): No  Elective Surgery:   Abstinence instructions provided prior to day of surgery:   Patient abstained from smoking on day of surgery:     Pre-Op Beta-Blocker in Isolated CABG (44)  Isolated CABG AND patient age >= 18: No  Beta-blocker admin within 24 hours of surgical incision:   Exception:of medical reason(s) for not administering beta blocker within 24 hours prior to surgical incision (e.g., not  indicated,other medical reason):     PACU assessment of acute postoperative pain prior to Anesthesia Care End- Applies to Patients Age = 18- (ABG7)  Initial PACU pain score is which of the following: < 7/10  Patient unable to report pain score: N/A    Post-anesthetic transfer of care checklist/protocol to PACU/ICU- (426 and 427)  Upon conclusion of case, patient transferred to which of the following locations: PACU/Non-ICU  Use of transfer checklist/protocol: Yes  Exclusion: Service Performed in Patient Hospital Room (and thus did not require transfer): N/A  Unplanned admission to ICU related to anesthesia service up through end of PACU care- (MD51)  Unplanned admission to ICU (not initially anticipated at anesthesia start time): No

## 2020-02-19 NOTE — CARE PLAN
Problem: Safety  Goal: Will remain free from injury  Outcome: PROGRESSING AS EXPECTED  Note: Patient will remain free from falls/injury. Safety measures in place. Pt calls appropriately.      Problem: Bowel/Gastric:  Goal: Normal bowel function is maintained or improved  Outcome: PROGRESSING AS EXPECTED  Note: Pt denies bloody stools at this time. Back from egd/colonoscopy.

## 2020-02-19 NOTE — DISCHARGE SUMMARY
Discharge Summary    CHIEF COMPLAINT ON ADMISSION  Chief Complaint   Patient presents with   • Weakness     Pt c/o generalized weakness; sent by MD for low hgb; reports hgb was 7.7 Saturday and today is 5.5   • Abnormal Labs       Reason for Admission  Abnormal Labs, anemia    CODE STATUS  Full Code    HPI & HOSPITAL COURSE  This is a 79 y.o. male with a history of end-stage renal disease on dialysis Monday was a Friday, hypertension, hyperlipidemia, coronary disease with a stent placed 1 month ago has been on aspirin and Plavix, recent hospitalization for anemia secondary to GI bleeding with unidentified source who presented 2/17/2020 with dark stool and weakness.    About 1 week ago he was hospitalized for weakness and dark stool.  GI was consulted and did an upper endoscopy which revealed a Schatzki's ring and esophagitis however no active bleeding.  Decision was made at that time to continue aspirin and Plavix however to discontinue Coumadin.  He had routine labs drawn on presentation and revealed a hemoglobin of 5.5.     He has had GI bleeding about 5 years ago and had an outpatient capsule endoscopy which was negative. The patient's hemoglobin improved to over 7 after transfusion of 2 units of blood. He was treated with his scheduled dialysis and cardiology agreed to hold his plavix but  Continue aspirin for his recent cardiac stent. 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 The patient's hemoglobin continued to drop and he is recommended to transfer to [facility] for capsule endoscopy which can be done 2/20 in the morning.    Therefore, he is discharged in fair and stable condition to a short-term general hosptial for inpatient care.    The patient met 2-midnight criteria for an inpatient stay at the time of discharge.      FOLLOW UP ITEMS POST DISCHARGE  GI for capsule endoscopy at [facility]    DISCHARGE DIAGNOSES  Principal Problem:    GI bleed POA:  Yes  Active Problems:    HELGA (obstructive sleep apnea) POA: Yes    Coronary artery disease POA: Yes      Overview: Coronary calcium was noted on chest CT scan again in 2010. Myocardial       perfusion imaging in 2011 showed no evidence of ischemia or infarction    Dyslipidemia POA: Yes    PAD (peripheral artery disease) ([facility]) POA: Yes    Essential hypertension POA: Yes    Severe aortic stenosis POA: Yes    Pancytopenia ([facility]) POA: Yes    Acute on chronic blood loss anemia POA: Yes    ESRD (end stage renal disease) ([facility]) POA: Yes  Resolved Problems:    * No resolved hospital problems. *      FOLLOW UP  Future Appointments   Date Time Provider Department Center   2/27/2020 12:30 PM Young Goodwin M.D. [facility] None   3/17/2020  1:00 PM Babak Burch M.D. [facility] None     No follow-up provider specified.    MEDICATIONS ON DISCHARGE     Medication List      ASK your doctor about these medications      Instructions   albuterol 108 (90 Base) MCG/ACT Aers inhalation aerosol   Inhale 1-2 Puffs by mouth every four hours as needed for Shortness of Breath.  Dose:  1-2 Puff     aspirin 81 MG Chew chewable tablet  Commonly known as:  ASA   Take 81 mg by mouth every day.  Dose:  81 mg     Calcium Acetate (Phos Binder) 667 MG Caps   TAKE 3 CAPSULES BY MOUTH WITH MEALS (3 MEALS) AND 2 CAPSULES WITH SNACKS (2 SNACKS)     clopidogrel 75 MG Tabs  Commonly known as:  PLAVIX   Take 75 mg by mouth every day.  Dose:  75 mg     DIALYVITE TABLET Tabs   TAKE ONE TABLET BY MOUTH DAILY     fluticasone 50 MCG/ACT nasal spray  Commonly known as:  FLONASE   Spray 1-2 Sprays in nose every day. Each nostril.  Dose:  1-2 Spray     Fluticasone-Umeclidin-Vilant 100-62.5-25 MCG/INH Aepb  Commonly known as:  Trelegy Ellipta   Inhale 1 Inhaler by mouth every day.  Dose:  1 Inhaler     levalbuterol 1.25 MG/3ML Nebu  Commonly known as:  Xopenex   Doctor's comments:  COPD J44.9  3 mL by Nebulization route every four hours as needed for Shortness of  Breath.  Dose:  1.25 mg     montelukast 10 MG Tabs  Commonly known as:  SINGULAIR   Take 1 Tab by mouth every evening.  Dose:  10 mg     omeprazole 40 MG delayed-release capsule  Commonly known as:  PRILOSEC   TAKE ONE CAPSULE BY MOUTH DAILY     pravastatin 20 MG Tabs  Commonly known as:  PRAVACHOL   Take 20 mg by mouth every evening.  Dose:  20 mg     ROPINIRole 0.5 MG Tabs  Commonly known as:  REQUIP   TAKE TWO TABLETS BY MOUTH DAILY     tamsulosin 0.4 MG capsule  Commonly known as:  FLOMAX   Take 0.8 mg by mouth every day.  Dose:  0.8 mg     torsemide 10 MG tablet  Commonly known as:  DEMADEX   TAKE THREE TABLETS BY MOUTH DAILY     traZODone 150 MG Tabs  Commonly known as:  DESYREL   Take 150-300 mg by mouth every bedtime.  Dose:  150-300 mg            Allergies  No Known Allergies    DIET  Orders Placed This Encounter   Procedures   • Diet Order Clear Liquids - No Red Foods, Renal     Standing Status:   Standing     Number of Occurrences:   1     Order Specific Question:   Diet:     Answer:   Clear Liquids - No Red Foods [12]     Order Specific Question:   Diet:     Answer:   Renal [8]       ACTIVITY  As tolerated.  Weight bearing as tolerated    LINES, DRAINS, AND WOUNDS  This is an automated list. Peripheral IVs will be removed prior to discharge.  Peripheral IV 02/17/20 20 G Left Upper arm (Active)   Site Assessment Clean;Dry;Intact 2/19/2020  8:00 AM   Dressing Type Transparent 2/19/2020  8:00 AM   Line Status Infusing 2/19/2020  8:00 AM   Dressing Status Clean;Dry;Intact 2/19/2020  8:00 AM   Dressing Intervention N/A 2/18/2020  8:06 PM   Infiltration Grading ([facility]) 0 2/19/2020  8:00 AM   Phlebitis Scale ([facility] Only) 0 2/19/2020  8:00 AM       Wound 09/12/19 Arterial Ulcer Toe (Comment which one) R distal hallux dry necrotic area, arterial (Active)       Peripheral IV 02/17/20 20 G Left Upper arm (Active)   Site Assessment Clean;Dry;Intact 2/19/2020  8:00 AM   Dressing Type Transparent 2/19/2020   8:00 AM   Line Status Infusing 2/19/2020  8:00 AM   Dressing Status Clean;Dry;Intact 2/19/2020  8:00 AM   Dressing Intervention N/A 2/18/2020  8:06 PM   Infiltration Grading ([facility], [facility]) 0 2/19/2020  8:00 AM   Phlebitis Scale ([facility] Only) 0 2/19/2020  8:00 AM       Hemodialysis AV Graft/Fistula Right AV fistula Upper arm (Active)   AV Fistula Status Bruit present;Thrill present 2/18/2020  1:10 PM   Site Assessment Clean;Dry;Intact 2/18/2020  1:10 PM   Status Accessed;Patent;Treatment performed 2/18/2020  1:10 PM   Local Anesthetic None 2/18/2020  1:10 PM   Site Prep Alcohol 2/18/2020  1:10 PM   Needle Size 15 G 2/18/2020  1:10 PM   Dressing Type Gauze 2/18/2020  1:10 PM   Dressing Status Clean;Dry;Intact 2/18/2020  1:10 PM   Dressing Intervention Initial dressing 2/18/2020  1:10 PM               MENTAL STATUS ON TRANSFER  Level of Consciousness: Alert  Orientation : Oriented x 4  Speech: Speech Clear    CONSULTATIONS  GI Dr. Westbrook  Nephrology Dr. Slater    PROCEDURES  Endoscopy with gastric AVM s/p ablation and hiatal hernia, no acute bleed  Colonoscopy showing sigmoid diverticulosis, no bleed    LABORATORY  Lab Results   Component Value Date    SODIUM 135 02/18/2020    POTASSIUM 5.1 02/18/2020    CHLORIDE 94 (L) 02/18/2020    CO2 19 (L) 02/18/2020    GLUCOSE 92 02/18/2020     (HH) 02/18/2020    CREATININE 9.75 (HH) 02/18/2020    CREATININE 2.1 (H) 05/06/2009        Lab Results   Component Value Date    WBC 6.1 02/17/2020    HEMOGLOBIN 7.9 (L) 02/19/2020    HEMATOCRIT 18.0 (L) 02/17/2020    PLATELETCT 253 02/17/2020        Total time of the discharge process exceeds 42 minutes.

## 2020-02-19 NOTE — ANESTHESIA POSTPROCEDURE EVALUATION
Patient: Parmjit Castelan    Procedure Summary     Date:  02/19/20 Room / Location:   ENDOSCOPIC ULTRASOUND ROOM / SURGERY [facility]    Anesthesia Start:  0715 Anesthesia Stop:  0802    Procedures:       GASTROSCOPY      COLONOSCOPY Diagnosis:       Gastric AVM      Diverticulosis      (Gastric AVM [228990])    Surgeon:  Gucci Westbrook M.D. Responsible Provider:  Antolin Hart M.D.    Anesthesia Type:  general ASA Status:  3          Final Anesthesia Type: general  Last vitals  BP   Blood Pressure : 158/73    Temp   36.3 °C (97.3 °F)    Pulse   Pulse: 100   Resp   16    SpO2   100 %      Anesthesia Post Evaluation    Patient location during evaluation: PACU  Patient participation: complete - patient participated  Level of consciousness: awake and alert  Pain score: 0    Airway patency: patent  Anesthetic complications: no  Cardiovascular status: hemodynamically stable  Respiratory status: acceptable  Hydration status: euvolemic    PONV: none           Nurse Pain Score: 0 (NPRS)

## 2020-02-19 NOTE — OR NURSING
0800 pt to pacu s/p general anes for EGD, push enteroscopy& colonoscopy. Hypotensive. DR. Hart aware.  OPA in place. Lungs clear.  0815 BP normalizing. OPA expelled without problems. HOB up 45 degrees.  0830 remains sleepy. Has dry cough.  Weaning O2 to 2liters. Pt denies pain or nausea. Allow to rest prn. 0845 Dr. Coto to see pt & review findings & POC.  Pt to remain on clear liquid diet for further diagnostics planned. Pt is STOP BANG.  Will cont to monitor. 0850 report to med tele RN. POC relayed. Pt remains doing well. 0900 transported to  in good condition.

## 2020-02-19 NOTE — ANESTHESIA PREPROCEDURE EVALUATION
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[facility])   (+) AVM (arteriovenous malformation)   (+) Coronary artery disease   (+) Essential hypertension   (+) Ischemic necrosis of foot ([facility])   (+) PAD (peripheral artery disease) ([facility])   (+) Severe aortic stenosis      GI   (+) Acute gastric ulcer         (+) ESRD (end stage renal disease) ([facility])   (+) ESRD (end stage renal disease) on dialysis ([facility])   (+) Hyperkalemia, diminished renal excretion   (+) Renal cyst   (+) Uremia      Other   (+) Uric acid arthropathy       Physical Exam    Airway   Mallampati: II  TM distance: >3 FB  Neck ROM: full       Cardiovascular - normal exam  Rhythm: regular  Rate: normal  (+) murmur     Dental - normal exam        Very poor dentition   Pulmonary - normal exam  Breath sounds clear to auscultation  (+) decreased breath sounds     Abdominal    Neurological - normal exam         Other findings: As murmer            Anesthesia Plan    ASA 3   ASA physical status 3 criteria: ESRD undergoing regularly scheduled dialysis    Plan - general       Airway plan will be natural airway        Induction: intravenous    Postoperative Plan: Postoperative administration of opioids is intended.    Pertinent diagnostic labs and testing reviewed    Informed Consent:    Anesthetic plan and risks discussed with patient.    Use of blood products discussed with: patient whom consented to blood products.

## 2020-02-19 NOTE — ANESTHESIA TIME REPORT
Anesthesia Start and Stop Event Times     Date Time Event    2/19/2020 0657 Ready for Procedure        Responsible Staff    No responsible staff documented.       Preop Diagnosis (Free Text):  Pre-op Diagnosis     anemia        Preop Diagnosis (Codes):    Post op Diagnosis  GI bleed      Premium Reason  Non-Premium    Comments:

## 2020-02-19 NOTE — PROGRESS NOTES
Report received from toño. Patient arrived back from egd/colonoscopy. VSS. Pt on clear liquid diet.

## 2020-02-19 NOTE — PROGRESS NOTES
Direct admit from [facility]. Referring/accepting is Dr. Joy for GI bleed scheduled for capsule endoscopy on 2/20/20 am.  Discharge and readmit orders signed and held--please release and implement upon pt arrival. Page the direct admit on call hospitalist to notify that patient has arrived & for any acute changes. Call referring/accepting MD from [facility] for any concerns related to the admission orders

## 2020-02-19 NOTE — PROGRESS NOTES
Telemetry Shift Summary    Rhythm:  SR  HR Range: 83-95  Ectopy: r PVC, r-f PAC  Measurements: .22/.14/.34          Normal Values  Rhythm SR  HR Range   Measurements 0.12 / 0.20 / 0.06-0.10 / 0.30-0.52

## 2020-02-20 PROBLEM — I48.92 PAROXYSMAL ATRIAL FLUTTER (HCC): Status: ACTIVE | Noted: 2019-06-12

## 2020-02-20 LAB
ABO GROUP BLD: NORMAL
BARCODED ABORH UBTYP: 9500
BARCODED PRD CODE UBPRD: NORMAL
BARCODED UNIT NUM UBUNT: NORMAL
BLD GP AB SCN SERPL QL: NORMAL
COMPONENT R 8504R: NORMAL
HGB BLD-MCNC: 8.3 G/DL (ref 14–18)
HGB BLD-MCNC: 8.6 G/DL (ref 14–18)
HGB BLD-MCNC: 8.8 G/DL (ref 14–18)
PRODUCT TYPE UPROD: NORMAL
RH BLD: NORMAL
UNIT STATUS USTAT: NORMAL

## 2020-02-20 PROCEDURE — A9270 NON-COVERED ITEM OR SERVICE: HCPCS | Performed by: FAMILY MEDICINE

## 2020-02-20 PROCEDURE — 85018 HEMOGLOBIN: CPT | Mod: 91

## 2020-02-20 PROCEDURE — 160048 HCHG OR STATISTICAL LEVEL 1-5: Performed by: INTERNAL MEDICINE

## 2020-02-20 PROCEDURE — A9270 NON-COVERED ITEM OR SERVICE: HCPCS | Performed by: INTERNAL MEDICINE

## 2020-02-20 PROCEDURE — C9113 INJ PANTOPRAZOLE SODIUM, VIA: HCPCS | Performed by: INTERNAL MEDICINE

## 2020-02-20 PROCEDURE — 30233N1 TRANSFUSION OF NONAUTOLOGOUS RED BLOOD CELLS INTO PERIPHERAL VEIN, PERCUTANEOUS APPROACH: ICD-10-PCS | Performed by: INTERNAL MEDICINE

## 2020-02-20 PROCEDURE — 36415 COLL VENOUS BLD VENIPUNCTURE: CPT

## 2020-02-20 PROCEDURE — P9016 RBC LEUKOCYTES REDUCED: HCPCS

## 2020-02-20 PROCEDURE — 99233 SBSQ HOSP IP/OBS HIGH 50: CPT | Performed by: FAMILY MEDICINE

## 2020-02-20 PROCEDURE — 700102 HCHG RX REV CODE 250 W/ 637 OVERRIDE(OP): Performed by: INTERNAL MEDICINE

## 2020-02-20 PROCEDURE — 770020 HCHG ROOM/CARE - TELE (206)

## 2020-02-20 PROCEDURE — 700111 HCHG RX REV CODE 636 W/ 250 OVERRIDE (IP): Performed by: INTERNAL MEDICINE

## 2020-02-20 PROCEDURE — 700105 HCHG RX REV CODE 258: Performed by: INTERNAL MEDICINE

## 2020-02-20 PROCEDURE — 91110 GI TRC IMG INTRAL ESOPH-ILE: CPT | Performed by: INTERNAL MEDICINE

## 2020-02-20 PROCEDURE — 86923 COMPATIBILITY TEST ELECTRIC: CPT

## 2020-02-20 PROCEDURE — 36430 TRANSFUSION BLD/BLD COMPNT: CPT

## 2020-02-20 PROCEDURE — 700102 HCHG RX REV CODE 250 W/ 637 OVERRIDE(OP): Performed by: FAMILY MEDICINE

## 2020-02-20 PROCEDURE — 0DJ07ZZ INSPECTION OF UPPER INTESTINAL TRACT, VIA NATURAL OR ARTIFICIAL OPENING: ICD-10-PCS | Performed by: INTERNAL MEDICINE

## 2020-02-20 RX ORDER — SIMETHICONE 40MG/0.6ML
40 SUSPENSION, DROPS(FINAL DOSAGE FORM)(ML) ORAL ONCE
Status: COMPLETED | OUTPATIENT
Start: 2020-02-20 | End: 2020-02-20

## 2020-02-20 RX ORDER — ROPINIROLE 2 MG/1
1 TABLET, FILM COATED ORAL
Status: DISCONTINUED | OUTPATIENT
Start: 2020-02-20 | End: 2020-02-26 | Stop reason: HOSPADM

## 2020-02-20 RX ADMIN — ASPIRIN 81 MG 81 MG: 81 TABLET ORAL at 06:00

## 2020-02-20 RX ADMIN — SODIUM CHLORIDE: 9 INJECTION, SOLUTION INTRAVENOUS at 00:22

## 2020-02-20 RX ADMIN — Medication 667 MG: at 11:45

## 2020-02-20 RX ADMIN — PANTOPRAZOLE SODIUM 40 MG: 40 INJECTION, POWDER, LYOPHILIZED, FOR SOLUTION INTRAVENOUS at 06:36

## 2020-02-20 RX ADMIN — ROPINIROLE HYDROCHLORIDE 1 MG: 2 TABLET, FILM COATED ORAL at 06:00

## 2020-02-20 RX ADMIN — ROPINIROLE HYDROCHLORIDE 1 MG: 2 TABLET, FILM COATED ORAL at 11:45

## 2020-02-20 RX ADMIN — ROPINIROLE HYDROCHLORIDE 1 MG: 2 TABLET, FILM COATED ORAL at 21:47

## 2020-02-20 RX ADMIN — TRAZODONE HYDROCHLORIDE 150 MG: 150 TABLET ORAL at 21:47

## 2020-02-20 RX ADMIN — TRAZODONE HYDROCHLORIDE 300 MG: 150 TABLET ORAL at 00:41

## 2020-02-20 RX ADMIN — SIMETHICONE 40 MG: 20 SUSPENSION/ DROPS ORAL at 07:41

## 2020-02-20 RX ADMIN — ROPINIROLE HYDROCHLORIDE 1 MG: 2 TABLET, FILM COATED ORAL at 00:41

## 2020-02-20 ASSESSMENT — ENCOUNTER SYMPTOMS
DIARRHEA: 0
BACK PAIN: 0
VOMITING: 0
FEVER: 0
WEAKNESS: 1
NECK PAIN: 0
DIZZINESS: 1
WHEEZING: 0
ABDOMINAL PAIN: 0
SORE THROAT: 0
HEADACHES: 0
FOCAL WEAKNESS: 0
CHILLS: 0
CONSTIPATION: 0
PALPITATIONS: 0
NERVOUS/ANXIOUS: 0
NAUSEA: 0
SHORTNESS OF BREATH: 0
HEARTBURN: 0
FLANK PAIN: 0
SENSORY CHANGE: 0
COUGH: 0
SPEECH CHANGE: 0
DIAPHORESIS: 0
BLOOD IN STOOL: 0
BLURRED VISION: 0

## 2020-02-20 ASSESSMENT — LIFESTYLE VARIABLES
ALCOHOL_USE: NO
CONSUMPTION TOTAL: NEGATIVE
EVER HAD A DRINK FIRST THING IN THE MORNING TO STEADY YOUR NERVES TO GET RID OF A HANGOVER: NO
ON A TYPICAL DAY WHEN YOU DRINK ALCOHOL HOW MANY DRINKS DO YOU HAVE: 0
TOTAL SCORE: 0
TOTAL SCORE: 0
HOW MANY TIMES IN THE PAST YEAR HAVE YOU HAD 5 OR MORE DRINKS IN A DAY: 0
EVER FELT BAD OR GUILTY ABOUT YOUR DRINKING: NO
HAVE PEOPLE ANNOYED YOU BY CRITICIZING YOUR DRINKING: NO
AVERAGE NUMBER OF DAYS PER WEEK YOU HAVE A DRINK CONTAINING ALCOHOL: 0
TOTAL SCORE: 0
HAVE YOU EVER FELT YOU SHOULD CUT DOWN ON YOUR DRINKING: NO

## 2020-02-20 ASSESSMENT — COGNITIVE AND FUNCTIONAL STATUS - GENERAL
DRESSING REGULAR LOWER BODY CLOTHING: A LITTLE
DAILY ACTIVITIY SCORE: 22
HELP NEEDED FOR BATHING: A LITTLE
SUGGESTED CMS G CODE MODIFIER DAILY ACTIVITY: CJ
MOBILITY SCORE: 23
SUGGESTED CMS G CODE MODIFIER MOBILITY: CI
CLIMB 3 TO 5 STEPS WITH RAILING: A LITTLE

## 2020-02-20 NOTE — PROGRESS NOTES
Notified hospitalist patient has arrived from [facility], also notified MD of 6.4 hgb.

## 2020-02-20 NOTE — PROGRESS NOTES
Telemetry Shift Summary     Rhythm:  SR/Frist Degree w/ BBB  HR Range: 84-96   Ectopy: r PVC, o PAC  Measurements: .22/.16/.38    Per Ale, MT              Normal Values  Rhythm SR  HR Range   Measurements 0.12 / 0.20 / 0.06-0.10 / 0.30-0.52

## 2020-02-20 NOTE — PROGRESS NOTES
Patient transferred to the main hospital via [facility]. VSS. meds given. Wife called and updated. Harpreet BYRD was called with report.

## 2020-02-20 NOTE — H&P
Hospital Medicine History & Physical Note    Date of Service  2/19/2020    Primary Care Physician  Martha Light M.D.    Consultants  GI Dr. Westbrook    Code Status  full    Chief Complaint  anemia    History of Presenting Illness   This is a 79 y.o. male with a history of end-stage renal disease on dialysis Monday was a Friday, hypertension, hyperlipidemia, coronary disease with a stent placed 1 month ago has been on aspirin and Plavix, recent hospitalization for anemia secondary to GI bleeding with unidentified source who presented 2/17/2020 with dark stool and weakness.    About 1 week ago he was hospitalized for weakness and dark stool.  GI was consulted and did an upper endoscopy which revealed a Schatzki's ring and esophagitis however no active bleeding.   He had routine labs drawn on presentation and revealed a hemoglobin of 5.5.     He has had GI bleeding about 5 years ago and had an outpatient capsule endoscopy which was negative. The patient's hemoglobin improved to over 7 after transfusion of 2 units of blood. He was treated with his scheduled dialysis and cardiology agreed to hold his plavix but  Continue aspirin for his recent cardiac stent. 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 The patient's hemoglobin continued to drop and he is recommended to transfer to [facility] for capsule endoscopy which can be done 2/20 in the morning.    Review of Systems  Review of Systems   Constitutional: Positive for malaise/fatigue. Negative for chills, diaphoresis and fever.   HENT: Negative for congestion and sore throat.    Respiratory: Negative for cough, shortness of breath, wheezing and stridor.    Cardiovascular: Negative for chest pain, palpitations, orthopnea and claudication.   Gastrointestinal: Negative for abdominal pain, constipation, diarrhea and nausea.   Genitourinary: Negative for dysuria, hematuria and urgency.   Musculoskeletal: Negative  for back pain, myalgias and neck pain.   Skin: Negative for itching and rash.   Neurological: Negative for dizziness, tingling, tremors and headaches.   Endo/Heme/Allergies: Does not bruise/bleed easily.   Psychiatric/Behavioral: Negative for depression. The patient is not nervous/anxious and does not have insomnia.    All other systems reviewed and are negative.      Past Medical History   has a past medical history of Anesthesia, Anticoagulation monitoring, special range, Aortic stenosis, Arterial leg ulcer ([facility]) (11/8/2018), Atrial flutter ([facility]) (6/12/2019), Basal cell carcinoma of left cheek (3/26/2015), BPH (7/14/2009), Bronchitis (12/25/2018), CAD (coronary artery disease) (2/20/2014), CATARACT, CHF (congestive heart failure), NYHA class II, chronic, systolic ([facility]) E F30 in setting of atrial flutter (6/13/2019), Chronic respiratory failure with hypoxia ([facility]) (5/8/2016), CKD (chronic kidney disease) stage 4, GFR 15-29 ml/min ([facility]) (1/15/2010), COPD (chronic obstructive pulmonary disease) ([facility]), Detached retina, Dialysis, EMPHYSEMA, Glaucoma, Gout, Heart burn, Heart murmur, Hypertension, Indigestion, Kidney cyst, Leg pain, bilateral (8/10/2015), On supplemental oxygen therapy, Peripheral vascular disease ([facility]) (8/10/2015), Pneumonia, Primary insomnia (3/22/2018), Proteinuria, PVD (peripheral vascular disease) ([facility]), and Sleep apnea.    Surgical History   has a past surgical history that includes cataract phaco with iol (4/8/08); av fistula creation (2/12/2010); av fistula revision (2/19/2010); av fistulogram (7/23/2010); angioplasty balloon (7/23/2010); av fistulogram (9/17/2010); angioplasty balloon (9/17/2010); vitrectomy posterior (1/18/2011); scleral buckling (1/18/2011); recovery (8/12/2011); vitrectomy posterior (10/11/2011); recovery (7/23/2012); recovery (1/29/2013); recovery (7/2/2013); av fistula thrombolysis (7/2/2013); recovery (12/17/2013); recovery (3/24/2014); recovery (7/29/2014); recovery  (3/24/2015); recovery (12/23/2015); gastroscopy with biopsy (8/13/2016); colonoscopy - endo (8/15/2016); endoscopy procedure (3/21/2018); femoral endarterectomy (Left, 1/25/2019); femoral popliteal bypass (Left, 1/25/2019); angiogram (Left, 1/25/2019); other orthopedic surgery (1998); av fistula creation (Right, 2/21/2019); lisa (6/13/2019); cardioversion (6/13/2019); av fistula creation (Right, 8/6/2019); cath placement (Right, 8/6/2019); stent placement (01/26/2020); gastroscopy-endo (2/7/2020); pr small bowel endoscopy,past 2nd duod (2/19/2020); colonoscopy - endo (2/19/2020); pr colonoscopy,diagnostic (2/19/2020); and gastroscopy (2/19/2020).     Family History  family history includes Genitourinary () Problems in his maternal aunt; Hypertension in his brother and mother; Lung Disease in his father; Stroke in his mother.     Social History   reports that he quit smoking about 11 years ago. His smoking use included cigarettes. He has a 40.00 pack-year smoking history. He has never used smokeless tobacco. He reports that he does not drink alcohol or use drugs.    Allergies  No Known Allergies    Medications  Albumin 25% solution with dialysis  Albuterol inhaler 1-2 puffs q4h prn  flonase nasal spray once daily  Aspirin 81mg daily  Phos-lo 667mg   singulair 10mg daily  requip 1mg   flomax 0.8mg daily  Trazodone 150mg daily  protonix 40mg iv bid  Rosuvastatin 40mg daily       Physical Exam  T98.2 /64 HR97 RR18 O2 sat 96% room air    Physical Exam  Vitals signs and nursing note reviewed.   Constitutional:       Appearance: He is not ill-appearing or diaphoretic.   HENT:      Nose: No congestion or rhinorrhea.      Mouth/Throat:      Mouth: Mucous membranes are moist.      Pharynx: No oropharyngeal exudate or posterior oropharyngeal erythema.   Eyes:      General: No scleral icterus.     Conjunctiva/sclera: Conjunctivae normal.      Pupils: Pupils are equal, round, and reactive to light.   Neck:       Musculoskeletal: Neck supple. No muscular tenderness.   Cardiovascular:      Rate and Rhythm: Normal rate and regular rhythm.      Pulses: Normal pulses.      Heart sounds: Normal heart sounds. No murmur.   Pulmonary:      Effort: Pulmonary effort is normal. No respiratory distress.      Breath sounds: Normal breath sounds. No wheezing.   Abdominal:      General: Bowel sounds are normal. There is no distension.      Tenderness: There is no abdominal tenderness.   Musculoskeletal:         General: No swelling or signs of injury.   Skin:     Capillary Refill: Capillary refill takes less than 2 seconds.      Coloration: Skin is pale. Skin is not jaundiced.      Findings: No bruising or erythema.   Neurological:      General: No focal deficit present.      Mental Status: He is alert and oriented to person, place, and time.      Motor: No weakness.      Coordination: Coordination normal.   Psychiatric:         Mood and Affect: Mood normal.         Thought Content: Thought content normal.         Laboratory:  Recent Labs     02/17/20  1117 02/17/20  1433  02/18/20  1835 02/19/20  0016 02/19/20  0957   WBC 5.6 6.1  --   --   --   --    RBC 1.77* 1.69*  --   --   --   --    HEMOGLOBIN 5.6* 5.5*   < > 8.0* 7.9* 7.5*   HEMATOCRIT 18.4* 18.0*  --   --   --   --    .0* 104.7*  --   --   --   --    MCH 31.6 32.0  --   --   --   --    MCHC 30.4* 30.5*  --   --   --   --    RDW 65.2* 65.3*  --   --   --   --    PLATELETCT 222 253  --   --   --   --    MPV 9.7 10.3  --   --   --   --     < > = values in this interval not displayed.     Recent Labs     02/17/20  1433 02/18/20  0228   SODIUM 134* 135   POTASSIUM 5.3 5.1   CHLORIDE 92* 94*   CO2 22 19*   GLUCOSE 110* 92   * 115*   CREATININE 9.41* 9.75*   CALCIUM 10.4* 10.2     Recent Labs     02/17/20  1433 02/18/20 0228   ALTSGPT 12  --    ASTSGOT 13  --    ALKPHOSPHAT 103*  --    TBILIRUBIN 0.4  --    GLUCOSE 110* 92     Recent Labs     02/19/20  0016   INR 1.08      No results for input(s): NTPROBNP in the last 72 hours.      Recent Labs     02/17/20  1433 02/17/20  2216 02/18/20  0228   TROPONINT 614* 554* 516*       Urinalysis:    No results found     Imaging:  No orders to display         Assessment/Plan:  I anticipate this patient will require at least two midnights for appropriate medical management, necessitating inpatient admission.    Severe aortic stenosis- (present on admission)  Assessment & Plan  Follows with Dr. Godoy and is to have a TAVR scheduled at some future date after current situation is stabilized    Dyslipidemia- (present on admission)  Assessment & Plan  Continue statin therapy    Coronary artery disease- (present on admission)  Assessment & Plan  Stent was placed a month ago, cardiology Dr. Contreras was consulted and agrees with holding plavix and coumadin given the patient's acute bleed but continue aspirin for now    HELGA (obstructive sleep apnea)- (present on admission)  Assessment & Plan  Oxygen while sleeping    Acute on chronic blood loss anemia- (present on admission)  Assessment & Plan  Secondary to GI bleed.  Recent upper endoscopy did not reveal active bleeding  GI has been consulted and push enteroscopy and colonoscopy today showed AV malformation of stomach that was treated with ablation and a hiatal hernia  Colonoscopy showed sigmoid diverticulosis with no bleeding areas  I discussed these results with Dr. Westbrook who recommends inpatient capsule endoscopy at [facility] and possible interventional radiology for treatment  Continue iv protonix bid  Monitor hemoglobin    ESRD (end stage renal disease) on dialysis (HCC)- (present on admission)  Assessment & Plan  Dialysis as scheduled with nephrology  Continue phos-lo and albumin for blood pressure support with dialysis    COPD (chronic obstructive pulmonary disease) (HCC)- (present on admission)  Assessment & Plan  No acute exacerbation, will continue respiratory therapy      VTE  prophylaxis: scd's

## 2020-02-20 NOTE — PROGRESS NOTES
HEMODIALYSIS NOTES:     HD today x 3 hours per Dr. Slater. Initiated at 1442 and ended at 1742. Patient tolerated treatment. Please see paper flowsheet for details.    UF Net: 2800 mL    Blood returned. Applied gauze and held YANN AVF site for 10 minutes. Patient has visible swelling and hard to touch on the access site due to previous cannulation in the clinic.  Verified no bleeding post HD. Bruit and thrill present post dialysis. Instructions given to Primary RN that if bleeding occurs on the AVF site, change dressing and held the site with pressure.     Report given to CRISTOPHER Vo RN.

## 2020-02-20 NOTE — ASSESSMENT & PLAN NOTE
IV Protonix  Capsule endoscopy -  2 bleeding foci seen - proximal small bowel and cecum. 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 2/19/2020  Colonoscopy - Scattered medium sized diverticuli noted. Examination suboptimal to exclude flat lesions like angiodysplasia. 2/19/2020  EGD w/ push enteroscopy and Colonoscopy - 2 small non-bleeding AVMs were seen in the proximal jejunum and were cauterized using APC. Sigmoid diverticulosis was noted (2) 3-4mm sessile polyps were removed from the ascending colon using cold snare, one cecal angioectasia was identified and cauterized using APC. There was no bleeding noted from this lesion. 2/24/2020

## 2020-02-20 NOTE — PROGRESS NOTES
Diagnosis: End-Stage Renal Disease admitted with anemia, GI bleed. Patient seen and examined on hemodialysis during treatment. Patient is stable, tolerating hemodialysis. Denies chest pain and shortness of breath. Orders updated as needed. Please refer to flowsheet for full details.    Access: R BC AVF  UF goal: 2.8L    Plan: Continue HD Monday Wednesday Friday. Further workup for GI bleed per GI. Increase epogen to 10,000 units with HD thrice weekly.     Jerry Slater MD  Nephrology

## 2020-02-20 NOTE — PROGRESS NOTES
Gastroenterology Progress Note     Author: ILAN Norman   Date & Time Created: 2/20/2020 10:15 AM    Chief Complaint:  Anemia    Interval History:  Last BM 2/18.  Denies abdominal pain, nausea, vomiting.  Reports he feels well.  Has capsule monitor in place.   Hgb dropped to 6.4 overnight.  Received 1 unit of blood.      EGD and colon yesterday with poor prep, only revealed non-bleednig gastric AVM.      Review of Systems:  Review of Systems   Constitutional: Negative for malaise/fatigue.   Cardiovascular: Negative for chest pain.   Gastrointestinal: Negative for abdominal pain, blood in stool, constipation, diarrhea, heartburn, melena, nausea and vomiting.       Physical Exam:  Physical Exam  HENT:      Head: Normocephalic.   Eyes:      Pupils: Pupils are equal, round, and reactive to light.   Cardiovascular:      Rate and Rhythm: Normal rate.      Heart sounds: Murmur present.   Abdominal:      General: Bowel sounds are normal.      Comments: Rounded, semifirm   Skin:     General: Skin is warm and dry.      Coloration: Skin is pale.   Neurological:      General: No focal deficit present.      Mental Status: He is alert and oriented to person, place, and time.   Psychiatric:         Mood and Affect: Mood normal.         Behavior: Behavior normal.         Thought Content: Thought content normal.         Judgment: Judgment normal.         Labs:          Recent Labs     02/17/20  1433 02/18/20 0228   SODIUM 134* 135   POTASSIUM 5.3 5.1   CHLORIDE 92* 94*   CO2 22 19*   * 115*   CREATININE 9.41* 9.75*   MAGNESIUM  --  1.8   PHOSPHORUS  --  3.5   CALCIUM 10.4* 10.2     Recent Labs     02/17/20  1433 02/18/20  0228   ALTSGPT 12  --    ASTSGOT 13  --    ALKPHOSPHAT 103*  --    TBILIRUBIN 0.4  --    GLUCOSE 110* 92     Recent Labs     02/17/20  1117 02/17/20  1433  02/19/20  0016 02/19/20  0957 02/19/20  1815 02/20/20  0413   RBC 1.77* 1.69*  --   --   --   --   --    HEMOGLOBIN 5.6* 5.5*    < > 7.9* 7.5* 6.4* 8.6*   HEMATOCRIT 18.4* 18.0*  --   --   --   --   --    PLATELETCT 222 253  --   --   --   --   --    PROTHROMBTM  --   --   --  14.1  --   --   --    INR  --   --   --  1.08  --   --   --    IRON 59  --   --   --   --   --   --    FERRITIN 208.0  --   --   --   --   --   --    TOTIRONBC 312  --   --   --   --   --   --     < > = values in this interval not displayed.     Recent Labs     20  1117 20  1433   WBC 5.6 6.1   NEUTSPOLYS 81.60* 80.10*   LYMPHOCYTES 7.90* 9.10*   MONOCYTES 6.60 7.90   EOSINOPHILS 3.00 2.30   BASOPHILS 0.40 0.30   ASTSGOT  --  13   ALTSGPT  --  12   ALKPHOSPHAT  --  103*   TBILIRUBIN  --  0.4     Hemodynamics:  Temp (24hrs), Av.9 °C (98.4 °F), Min:36.4 °C (97.5 °F), Max:37.4 °C (99.3 °F)  Temperature: 36.6 °C (97.8 °F)  Pulse  Av.9  Min: 72  Max: 100   Blood Pressure : 129/58     Respiratory:    Respiration: 15, Pulse Oximetry: 92 %        RUL Breath Sounds: Clear, RML Breath Sounds: Clear, RLL Breath Sounds: Diminished, SILVANA Breath Sounds: Clear, LLL Breath Sounds: Diminished  Fluids:    Intake/Output Summary (Last 24 hours) at 2020 1015  Last data filed at 2020 0215  Gross per 24 hour   Intake 300 ml   Output --   Net 300 ml     Weight: 70.5 kg (155 lb 6.8 oz)  GI/Nutrition:  Orders Placed This Encounter   Procedures   • Diet Order Clear Liquids - No Red Foods, Renal     Standing Status:   Standing     Number of Occurrences:   1     Order Specific Question:   Diet:     Answer:   Clear Liquids - No Red Foods [12]     Order Specific Question:   Diet:     Answer:   Renal [8]     Medical Decision Making, by Problem:  Active Hospital Problems    Diagnosis   • Acute on chronic blood loss anemia [D62]   • GI bleed [K92.2]   • Severe aortic stenosis [I35.0]   • ESRD (end stage renal disease) on dialysis ([facility]) [N18.6, Z99.2]   • Coronary artery disease [I25.10]   • Dyslipidemia [E78.5]   • COPD (chronic obstructive pulmonary disease) ([facility]) [J44.9]    • HELGA (obstructive sleep apnea) [G47.33]       Plan:  Continue capsule endoscopy.  Continue to monitor hgb, transfuse PRN to keep greater than 7.  Based on clinical course and PillCam study, he may require repeat colonoscopy after an extended bowel prep.  Further recommendations to follow.         Quality-Core Measures

## 2020-02-20 NOTE — PROGRESS NOTES
I saw and examined the patient by the bedside.  Patient was transferred from [facility] for GI bleed.  Hemoglobin was 6.4.  I ordered 1 unit of blood for him.  Patient is planning to get capsule endoscopy tomorrow.

## 2020-02-20 NOTE — DISCHARGE PLANNING
Anticipated Discharge Disposition: Main-Tele 736-1    Action: LSW completed transfer packet and COBRA. Transfer packet and COBRA signed by pt and given to bedside RN.    Barriers to Discharge: None    Plan: LSW will continue to assess pt for discharge needs.

## 2020-02-20 NOTE — CARE PLAN
Problem: Communication  Goal: The ability to communicate needs accurately and effectively will improve  Intervention: Educate patient and significant other/support system about the plan of care, procedures, treatments, medications and allow for questions  Note: Educated patient to voice questions and concerns regarding plan of care. Patient verbalizes understanding.     Problem: Safety  Goal: Will remain free from falls  Intervention: Implement fall precautions  Note: Safety precautions and fall prevention in place. Fall prevention education provided. Patient verbalized understanding. Bed in low locked position, bed alarm on, treaded socks on patient, call bell within reach. Patient calls appropriately as needed.

## 2020-02-20 NOTE — CARE PLAN
Problem: Safety  Goal: Will remain free from falls  Outcome: PROGRESSING AS EXPECTED  Intervention: Implement fall precautions  Flowsheets (Taken 2/19/2020 9330)  Environmental Precautions:   Treaded Slipper Socks on Patient   Transferred to Stronger Side   Bed in Low Position   Mobility Assessed & Appropriate Sign Placed   Personal Belongings, Wastebasket, Call Bell etc. in Easy Reach   Communication Sign for Patients & Families   Report Given to Other Health Care Providers Regarding Fall Risk     Problem: Infection  Goal: Will remain free from infection  Outcome: PROGRESSING AS EXPECTED  Intervention: Implement standard precautions and perform hand washing before and after patient contact  Note: Hand hygiene performed before and after all patient care.

## 2020-02-20 NOTE — DISCHARGE PLANNING
Outpatient Dialysis Note     Confirmed patient is active at:     [facility]  [address], [place] [address]         Schedule: Monday, Wednesday, Friday  Time: 3:15 pm     Spoke with Kate at facility who confirmed.     Forwarded records for review.        Dawna Santa- Dialysis Coordinator  [facility] # [phone]

## 2020-02-20 NOTE — PROGRESS NOTES
Received from  to [id number] via [facility], able to get oob, RUE AVF dressing CDI, placed on telemonitor, report given to receiving ROCHELLE Fierro, aware of hgb level and will advise admtting MD as planned.

## 2020-02-20 NOTE — RESPIRATORY CARE
COPD EDUCATION by COPD CLINICAL EDUCATOR  2/20/2020  at  10:39 AM by Uyen Latham, RRT     Patient interviewed by COPD education team.  Patient declined to participate in full program.  Short intervention has been conducted.  A comprehensive packet including information about COPD, treatments, and smoking cessation given.

## 2020-02-20 NOTE — PROGRESS NOTES
[facility] Medicine Daily Progress Note    Date of Service  2/20/2020    Chief Complaint  79 y.o. male admitted 2/19/2020 with GI bleeding.    Hospital Course  Admitted with GI bleeding, Anemia requiring transfusion.    Interval Problem Update  GIB - capsule endoscopy today  Anemia - hgb 8.6  Aflutter - currently SR    Consultants/Specialty  Gastroenterology  Cardiology  Nephrology    Code Status  Full    Disposition  TBD    Review of Systems  Review of Systems   Constitutional: Positive for malaise/fatigue. Negative for chills, diaphoresis and fever.   HENT: Negative for hearing loss and sore throat.    Eyes: Negative for blurred vision.   Respiratory: Negative for cough, shortness of breath and wheezing.    Cardiovascular: Negative for chest pain, palpitations and leg swelling.   Gastrointestinal: Negative for abdominal pain, diarrhea, heartburn, nausea and vomiting.   Genitourinary: Negative for dysuria, flank pain and hematuria.   Musculoskeletal: Negative for back pain and neck pain.   Skin: Negative for rash.   Neurological: Positive for dizziness and weakness. Negative for sensory change, speech change, focal weakness and headaches.   Psychiatric/Behavioral: The patient is not nervous/anxious.         Physical Exam  Temp:  [36.4 °C (97.5 °F)-37.4 °C (99.3 °F)] 36.6 °C (97.8 °F)  Pulse:  [] 72  Resp:  [15-20] 15  BP: (110-138)/(53-60) 129/58  SpO2:  [88 %-93 %] 92 %    Physical Exam  HENT:      Head: Normocephalic and atraumatic.      Nose: No congestion.      Mouth/Throat:      Mouth: Mucous membranes are moist.   Eyes:      Conjunctiva/sclera: Conjunctivae normal.      Pupils: Pupils are equal, round, and reactive to light.   Neck:      Musculoskeletal: No muscular tenderness.   Cardiovascular:      Rate and Rhythm: Normal rate and regular rhythm.      Heart sounds: Murmur present.   Pulmonary:      Effort: Pulmonary effort is normal.      Breath sounds: Normal breath sounds.   Abdominal:      General:  Bowel sounds are normal. There is no distension.      Palpations: Abdomen is soft.      Tenderness: There is no abdominal tenderness. There is no guarding or rebound.   Musculoskeletal:      Right lower leg: Edema present.      Left lower leg: Edema present.   Lymphadenopathy:      Cervical: No cervical adenopathy.   Skin:     General: Skin is warm and dry.   Neurological:      General: No focal deficit present.      Mental Status: He is alert and oriented to person, place, and time.      Cranial Nerves: No cranial nerve deficit.         Fluids    Intake/Output Summary (Last 24 hours) at 2/20/2020 1027  Last data filed at 2/20/2020 0215  Gross per 24 hour   Intake 300 ml   Output --   Net 300 ml       Laboratory  Recent Labs     02/17/20  1117 02/17/20  1433  02/19/20  0957 02/19/20  1815 02/20/20  0413   WBC 5.6 6.1  --   --   --   --    RBC 1.77* 1.69*  --   --   --   --    HEMOGLOBIN 5.6* 5.5*   < > 7.5* 6.4* 8.6*   HEMATOCRIT 18.4* 18.0*  --   --   --   --    .0* 104.7*  --   --   --   --    MCH 31.6 32.0  --   --   --   --    MCHC 30.4* 30.5*  --   --   --   --    RDW 65.2* 65.3*  --   --   --   --    PLATELETCT 222 253  --   --   --   --    MPV 9.7 10.3  --   --   --   --     < > = values in this interval not displayed.     Recent Labs     02/17/20  1433 02/18/20  0228   SODIUM 134* 135   POTASSIUM 5.3 5.1   CHLORIDE 92* 94*   CO2 22 19*   GLUCOSE 110* 92   * 115*   CREATININE 9.41* 9.75*   CALCIUM 10.4* 10.2     Recent Labs     02/19/20  0016   INR 1.08               Imaging  No orders to display        Assessment/Plan  * GI bleed- (present on admission)  Assessment & Plan  Capsule endoscopy today  IV Protonix  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 2/19/2020  Colonoscopy - Scattered medium sized diverticuli noted. Examination suboptimal to exclude flat lesions like angiodysplasia. 2/19/2020    Acute on chronic blood loss anemia- (present on admission)  Assessment &  Plan  Transfused 2 u PRBC  Serial hgb    Paroxysmal atrial flutter ([facility])- (present on admission)  Assessment & Plan  Holding Coumadin    Severe aortic stenosis- (present on admission)  Assessment & Plan  watch fluid status  Holding Torsemide    ESRD (end stage renal disease) on dialysis ([facility])- (present on admission)  Assessment & Plan  HD per Nephrology    PAD (peripheral artery disease) ([facility])- (present on admission)  Assessment & Plan  ASA, Crestor    Coronary artery disease- (present on admission)  Assessment & Plan  History of stent  ASA, Crestor  Hold Plavix - discussed with Cardiology    COPD (chronic obstructive pulmonary disease) ([facility])- (present on admission)  Assessment & Plan  Anoro, Flovent, RT protocol    HELGA (obstructive sleep apnea)- (present on admission)  Assessment & Plan  RT protocol    Dyslipidemia- (present on admission)  Assessment & Plan  Crestor         VTE prophylaxis: SCD

## 2020-02-20 NOTE — PROGRESS NOTES
Received report from NOC shift RN. Patient is A&Ox4, no complaints of pain, VSS, no signs of distress. Capsule study in progress. Educated patient on nothing to eat till 1000. Patient verbalizes understanding. All questions and concerns answered, bed in lowest and locked position, call light in reach, will continue to monitor.

## 2020-02-20 NOTE — PROGRESS NOTES
2 RN skin check complete with Evelyne RN  Devices in place None  Skin assessed under device N/A  Confirmed wounds found: Small skin tear to right shin  New potential wounds noted on None  Wound consult placed: N/A  The following interventions in place: Mepelex over skin tear, pillows in place for support, patient instructed to frequently reposition self in bed.

## 2020-02-21 LAB
ANION GAP SERPL CALC-SCNC: 14 MMOL/L (ref 0–11.9)
BASOPHILS # BLD AUTO: 0.5 % (ref 0–1.8)
BASOPHILS # BLD: 0.02 K/UL (ref 0–0.12)
BUN SERPL-MCNC: 49 MG/DL (ref 8–22)
CALCIUM SERPL-MCNC: 9.2 MG/DL (ref 8.5–10.5)
CHLORIDE SERPL-SCNC: 102 MMOL/L (ref 96–112)
CO2 SERPL-SCNC: 26 MMOL/L (ref 20–33)
CREAT SERPL-MCNC: 5.77 MG/DL (ref 0.5–1.4)
EOSINOPHIL # BLD AUTO: 0.18 K/UL (ref 0–0.51)
EOSINOPHIL NFR BLD: 4.3 % (ref 0–6.9)
ERYTHROCYTE [DISTWIDTH] IN BLOOD BY AUTOMATED COUNT: 65.1 FL (ref 35.9–50)
GLUCOSE SERPL-MCNC: 95 MG/DL (ref 65–99)
HCT VFR BLD AUTO: 25.9 % (ref 42–52)
HGB BLD-MCNC: 8.1 G/DL (ref 14–18)
HGB BLD-MCNC: 8.3 G/DL (ref 14–18)
IMM GRANULOCYTES # BLD AUTO: 0.01 K/UL (ref 0–0.11)
IMM GRANULOCYTES NFR BLD AUTO: 0.2 % (ref 0–0.9)
LYMPHOCYTES # BLD AUTO: 0.57 K/UL (ref 1–4.8)
LYMPHOCYTES NFR BLD: 13.6 % (ref 22–41)
MCH RBC QN AUTO: 31.3 PG (ref 27–33)
MCHC RBC AUTO-ENTMCNC: 32 G/DL (ref 33.7–35.3)
MCV RBC AUTO: 97.7 FL (ref 81.4–97.8)
MONOCYTES # BLD AUTO: 0.51 K/UL (ref 0–0.85)
MONOCYTES NFR BLD AUTO: 12.1 % (ref 0–13.4)
NEUTROPHILS # BLD AUTO: 2.91 K/UL (ref 1.82–7.42)
NEUTROPHILS NFR BLD: 69.3 % (ref 44–72)
NRBC # BLD AUTO: 0 K/UL
NRBC BLD-RTO: 0 /100 WBC
PLATELET # BLD AUTO: 201 K/UL (ref 164–446)
PMV BLD AUTO: 9.4 FL (ref 9–12.9)
POTASSIUM SERPL-SCNC: 4.1 MMOL/L (ref 3.6–5.5)
RBC # BLD AUTO: 2.65 M/UL (ref 4.7–6.1)
SODIUM SERPL-SCNC: 142 MMOL/L (ref 135–145)
WBC # BLD AUTO: 4.2 K/UL (ref 4.8–10.8)

## 2020-02-21 PROCEDURE — 700101 HCHG RX REV CODE 250

## 2020-02-21 PROCEDURE — 700102 HCHG RX REV CODE 250 W/ 637 OVERRIDE(OP): Performed by: FAMILY MEDICINE

## 2020-02-21 PROCEDURE — C9113 INJ PANTOPRAZOLE SODIUM, VIA: HCPCS | Performed by: INTERNAL MEDICINE

## 2020-02-21 PROCEDURE — A9270 NON-COVERED ITEM OR SERVICE: HCPCS | Performed by: FAMILY MEDICINE

## 2020-02-21 PROCEDURE — 80048 BASIC METABOLIC PNL TOTAL CA: CPT

## 2020-02-21 PROCEDURE — A9270 NON-COVERED ITEM OR SERVICE: HCPCS | Performed by: INTERNAL MEDICINE

## 2020-02-21 PROCEDURE — 700102 HCHG RX REV CODE 250 W/ 637 OVERRIDE(OP): Performed by: INTERNAL MEDICINE

## 2020-02-21 PROCEDURE — 700111 HCHG RX REV CODE 636 W/ 250 OVERRIDE (IP): Performed by: INTERNAL MEDICINE

## 2020-02-21 PROCEDURE — 85018 HEMOGLOBIN: CPT

## 2020-02-21 PROCEDURE — 5A1D70Z PERFORMANCE OF URINARY FILTRATION, INTERMITTENT, LESS THAN 6 HOURS PER DAY: ICD-10-PCS | Performed by: INTERNAL MEDICINE

## 2020-02-21 PROCEDURE — 36415 COLL VENOUS BLD VENIPUNCTURE: CPT

## 2020-02-21 PROCEDURE — 90935 HEMODIALYSIS ONE EVALUATION: CPT

## 2020-02-21 PROCEDURE — 770020 HCHG ROOM/CARE - TELE (206)

## 2020-02-21 PROCEDURE — 99232 SBSQ HOSP IP/OBS MODERATE 35: CPT | Performed by: INTERNAL MEDICINE

## 2020-02-21 PROCEDURE — 85025 COMPLETE CBC W/AUTO DIFF WBC: CPT

## 2020-02-21 PROCEDURE — 99233 SBSQ HOSP IP/OBS HIGH 50: CPT | Performed by: FAMILY MEDICINE

## 2020-02-21 RX ORDER — LIDOCAINE HYDROCHLORIDE 10 MG/ML
INJECTION, SOLUTION INFILTRATION; PERINEURAL
Status: COMPLETED
Start: 2020-02-21 | End: 2020-02-21

## 2020-02-21 RX ORDER — BENZONATATE 100 MG/1
100 CAPSULE ORAL 3 TIMES DAILY PRN
Status: DISCONTINUED | OUTPATIENT
Start: 2020-02-21 | End: 2020-02-26 | Stop reason: HOSPADM

## 2020-02-21 RX ADMIN — SENNOSIDES AND DOCUSATE SODIUM 2 TABLET: 8.6; 5 TABLET ORAL at 05:46

## 2020-02-21 RX ADMIN — Medication 1 ML: at 13:10

## 2020-02-21 RX ADMIN — ASPIRIN 81 MG 81 MG: 81 TABLET ORAL at 05:46

## 2020-02-21 RX ADMIN — FLUTICASONE PROPIONATE 100 MCG: 50 SPRAY, METERED NASAL at 05:46

## 2020-02-21 RX ADMIN — Medication 667 MG: at 11:41

## 2020-02-21 RX ADMIN — PANTOPRAZOLE SODIUM 40 MG: 40 INJECTION, POWDER, LYOPHILIZED, FOR SOLUTION INTRAVENOUS at 05:46

## 2020-02-21 RX ADMIN — Medication 667 MG: at 05:46

## 2020-02-21 RX ADMIN — LIDOCAINE HYDROCHLORIDE 1 ML: 10 INJECTION, SOLUTION INFILTRATION; PERINEURAL at 13:10

## 2020-02-21 RX ADMIN — ROSUVASTATIN CALCIUM 40 MG: 20 TABLET, FILM COATED ORAL at 17:09

## 2020-02-21 RX ADMIN — ROPINIROLE HYDROCHLORIDE 1 MG: 2 TABLET, FILM COATED ORAL at 23:07

## 2020-02-21 RX ADMIN — Medication 667 MG: at 17:09

## 2020-02-21 RX ADMIN — BENZONATATE 100 MG: 100 CAPSULE ORAL at 04:27

## 2020-02-21 RX ADMIN — TAMSULOSIN HYDROCHLORIDE 0.8 MG: 0.4 CAPSULE ORAL at 05:46

## 2020-02-21 RX ADMIN — MONTELUKAST 10 MG: 10 TABLET, FILM COATED ORAL at 17:09

## 2020-02-21 RX ADMIN — TRAZODONE HYDROCHLORIDE 300 MG: 150 TABLET ORAL at 23:06

## 2020-02-21 RX ADMIN — PANTOPRAZOLE SODIUM 40 MG: 40 INJECTION, POWDER, LYOPHILIZED, FOR SOLUTION INTRAVENOUS at 17:09

## 2020-02-21 ASSESSMENT — ENCOUNTER SYMPTOMS
DIZZINESS: 1
HEARTBURN: 0
ABDOMINAL PAIN: 0
CONSTIPATION: 0
NERVOUS/ANXIOUS: 0
COUGH: 0
BLOOD IN STOOL: 0
FLANK PAIN: 0
SORE THROAT: 0
BLURRED VISION: 0
NECK PAIN: 0
SPEECH CHANGE: 0
VOMITING: 0
NAUSEA: 0
SHORTNESS OF BREATH: 0
BACK PAIN: 0
DIAPHORESIS: 0
PALPITATIONS: 0
FOCAL WEAKNESS: 0
DIARRHEA: 0
WEAKNESS: 1
WHEEZING: 0
FEVER: 0
CHILLS: 0
HEADACHES: 0
SENSORY CHANGE: 0

## 2020-02-21 NOTE — PROGRESS NOTES
Nephrology Daily Progress Note    Date of Service  2/21/2020    Chief Complaint  79 y.o. male with ESRD on HD Monday Wednesday Friday at [facility] who presented 2/17/2020 with low hemoglobin.    Interval Problem Update  2/21 -patient transferred to [facility] for capsule endoscopy.  Patient awaiting results of capsule endoscopy.  Patient denies chest pain, shortness of breath.  Denies bright red blood per rectum, but complains of continued dark stools.    Review of Systems  Review of Systems   Constitutional: Negative for fever.   Respiratory: Negative for shortness of breath.    Cardiovascular: Negative for chest pain.   Gastrointestinal: Negative for abdominal pain.   All other systems reviewed and are negative.       Physical Exam  Temp:  [36.2 °C (97.1 °F)-37.3 °C (99.1 °F)] 37.1 °C (98.7 °F)  Pulse:  [75-94] 89  Resp:  [16-21] 20  BP: (112-149)/(44-80) 126/54  SpO2:  [95 %-97 %] 95 %    Physical Exam   Constitutional: He is oriented to person, place, and time. He appears well-developed. No distress.   HENT:   Mouth/Throat: Oropharynx is clear and moist. No oropharyngeal exudate.   Eyes: EOM are normal. No scleral icterus.   Neck: No tracheal deviation present.   Cardiovascular: Normal rate and normal heart sounds.   No murmur heard.  Pulmonary/Chest: Effort normal and breath sounds normal. No stridor. He has no rales.   Abdominal: Soft. He exhibits no distension. There is no abdominal tenderness.   Musculoskeletal: Normal range of motion.         General: Edema (1+ LE) present.   Neurological: He is alert and oriented to person, place, and time.   Skin: Skin is warm and dry. He is not diaphoretic.   Psychiatric: He has a normal mood and affect.   Access: Right brachiocephalic AV fistula.  There is soft tissue swelling in the distal part of the upper arm near the elbow, deep to the access, but the access has patent bruit and thrill.    Fluids    Intake/Output Summary (Last 24 hours) at 2/21/2020  1509  Last data filed at 2/21/2020 1433  Gross per 24 hour   Intake 480 ml   Output --   Net 480 ml       Laboratory  Labs reviewed, pertinent labs below.  Recent Labs     02/20/20  1855 02/21/20  0332 02/21/20  1110   WBC  --  4.2*  --    RBC  --  2.65*  --    HEMOGLOBIN 8.8* 8.3* 8.1*   HEMATOCRIT  --  25.9*  --    MCV  --  97.7  --    MCH  --  31.3  --    MCHC  --  32.0*  --    RDW  --  65.1*  --    PLATELETCT  --  201  --    MPV  --  9.4  --      Recent Labs     02/21/20  0332   SODIUM 142   POTASSIUM 4.1   CHLORIDE 102   CO2 26   GLUCOSE 95   BUN 49*   CREATININE 5.77*   CALCIUM 9.2     Recent Labs     02/19/20  0016   INR 1.08           URINALYSIS:  Lab Results   Component Value Date/Time    COLORURINE Yellow 03/19/2018 2009    CLARITY Sl Cloudy (A) 03/19/2018 2009    SPECGRAVITY 1.010 03/19/2018 2009    PHURINE 8.5 (A) 03/19/2018 2009    KETONES Negative 03/19/2018 2009    PROTEINURIN >=300 (A) 03/19/2018 2009    BILIRUBINUR Negative 03/19/2018 2009    NITRITE Negative 03/19/2018 2009    LEUKESTERAS Negative 03/19/2018 2009    OCCULTBLOOD Small (A) 03/19/2018 2009     [facility]  [place] Results   Component Value Date/Time    TOTPROTUR 774.0 (H) 01/09/2012 1644      Lab Results   Component Value Date/Time    CREATININEU 141.57 01/09/2012 1644         Imaging reviewed  No orders to display         Current Facility-Administered Medications   Medication Dose Route Frequency Provider Last Rate Last Dose   • benzonatate (TESSALON) capsule 100 mg  100 mg Oral TID PRN Zaheer Monreal M.D.   100 mg at 02/21/20 0427   • ROPINIRole (REQUIP) tablet 1 mg  1 mg Oral QHS Shad Cole M.D.   1 mg at 02/20/20 2147   • ondansetron (ZOFRAN ODT) dispertab 4 mg  4 mg Oral Q4HRS PRN Urvashi Joy M.D.       • ondansetron (ZOFRAN) syringe/vial injection 4 mg  4 mg Intravenous Q4HRS PRN Urvashi Joy M.D.       • senna-docusate (PERICOLACE or SENOKOT S) 8.6-50 MG per tablet 2 Tab  2 Tab Oral BID Urvashi Joy M.D.   2 Tab at 02/21/20  0546    And   • polyethylene glycol/lytes (MIRALAX) PACKET 1 Packet  1 Packet Oral QDAY PRN Urvashi Joy M.D.        And   • magnesium hydroxide (MILK OF MAGNESIA) suspension 30 mL  30 mL Oral QDAY PRN Urvashi Joy M.D.        And   • bisacodyl (DULCOLAX) suppository 10 mg  10 mg Rectal QDAY PRN Urvashi Joy M.D.       • albumin human 25% solution 12.5 g  12.5 g Intravenous DIALYSIS PRN Urvashi Joy M.D.       • NS (BOLUS) infusion 250 mL  250 mL Intravenous DIALYSIS PRN Urvashi Joy M.D.       • lidocaine (XYLOCAINE) 1 % injection 1 mL  1 mL Intradermal PRN Urvashi Joy M.D.   1 mL at 02/21/20 1310   • albuterol inhaler 1-2 Puff  1-2 Puff Inhalation Q4HRS PRN Urvashi Joy M.D.       • aspirin (ASA) chewable tab 81 mg  81 mg Oral DAILY Urvashi Joy M.D.   81 mg at 02/21/20 0546   • calcium acetate (PHOS-LO) 667 MG tablet 667 mg  667 mg Oral TID AC Urvashi Joy M.D.   667 mg at 02/21/20 1141   • fluticasone (FLONASE) nasal spray  mcg  1-2 Spray Nasal DAILY Urvashi Joy M.D.   100 mcg at 02/21/20 0546   • fluticasone (FLOVENT HFA) 44 MCG/ACT inhaler 88 mcg  2 Puff Inhalation DAILY Urvashi Joy M.D.   Stopped at 02/20/20 0600   • levalbuterol (XOPENEX) 1.25 MG/3ML nebulizer solution 1.25 mg  1.25 mg Nebulization Q4HRS PRN Urvashi Joy M.D.       • montelukast (SINGULAIR) tablet 10 mg  10 mg Oral Q EVENING Urvashi Joy M.D.       • pantoprazole (PROTONIX) injection 40 mg  40 mg Intravenous BID Urvashi Joy M.D.   40 mg at 02/21/20 0546   • rosuvastatin (CRESTOR) tablet 40 mg  40 mg Oral Q EVENING Urvashi Joy M.D.       • tamsulosin (FLOMAX) capsule 0.8 mg  0.8 mg Oral DAILY Urvashi Joy M.D.   0.8 mg at 02/21/20 0546   • traZODone (DESYREL) tablet 150-300 mg  150-300 mg Oral QHS Urvashi Joy M.D.   150 mg at 02/20/20 2147   • umeclidinium-vilanterol (ANORO ELLIPTA) inhaler 1 Puff  1 Puff Inhalation DAILY Urvashi Joy M.D.   Stopped at 02/20/20 0600          Assessment/Plan  79 y.o. male with ESRD on HD Monday Wednesday Friday at [facility] who presented 2/17/2020 with low hemoglobin.     1. ESRD on HD Monday Wednesday Friday.  Stable.  Plan for dialysis today per usual schedule.  Patient is essentially anuric.  Check labs daily.    2. Access: R BC AVF, stable, with hematoma near the arterial anastomosis.  Avoid IVs, blood pressure checks, and lab draws in the right arm.  Patient should only be accessed by an expert cannulater.     3. Acute blood loss anemia, unclear source.  Continues to worsen.  Pill capsule endoscopy done, results pending.  Defer further management to GI.  Check CBC daily.     4. Anemia of CKD.  Continues to worsen.  Continue Epogen 6000 units 3 times weekly with dialysis.  Check CBC daily.     5. Hyperkalemia, noted on admission, now resolved.  Check labs daily.  Low potassium diet.     6. HTN, mostly controlled.  Plan on ultrafiltration with dialysis as tolerated.     Following      Jerry Slater MD  Nephrology

## 2020-02-21 NOTE — PROGRESS NOTES
Pt AOx4, up with walker in room. No c/o pain. Discussed plan for dialysis today with Dr. Cole at bedside- he is contacting nephrology. Fall precautions in place. R arm precautions in place.

## 2020-02-21 NOTE — PROGRESS NOTES
Hospital Medicine Daily Progress Note    Date of Service  2/21/2020    Chief Complaint  79 y.o. male admitted 2/19/2020 with GI bleeding.    Hospital Course  Admitted with GI bleeding, Anemia requiring transfusion.    Interval Problem Update  GIB - capsule endoscopy results pending  Anemia - hgb 8.3  Aflutter - currently SR  ESRD - discussed with Nephrology, for HD today    Consultants/Specialty  Gastroenterology  Cardiology  Nephrology    Code Status  Full    Disposition  TBD    Review of Systems  Review of Systems   Constitutional: Positive for malaise/fatigue. Negative for chills, diaphoresis and fever.   HENT: Negative for hearing loss and sore throat.    Eyes: Negative for blurred vision.   Respiratory: Negative for cough, shortness of breath and wheezing.    Cardiovascular: Negative for chest pain, palpitations and leg swelling.   Gastrointestinal: Negative for abdominal pain, blood in stool, diarrhea, heartburn, melena, nausea and vomiting.   Genitourinary: Negative for dysuria, flank pain and hematuria.   Musculoskeletal: Negative for back pain and neck pain.   Skin: Negative for rash.   Neurological: Positive for dizziness and weakness. Negative for sensory change, speech change, focal weakness and headaches.   Psychiatric/Behavioral: The patient is not nervous/anxious.         Physical Exam  Temp:  [36.2 °C (97.1 °F)-37.3 °C (99.1 °F)] 37.2 °C (98.9 °F)  Pulse:  [75-94] 75  Resp:  [16-21] 21  BP: (112-149)/(44-80) 112/80  SpO2:  [94 %-97 %] 96 %    Physical Exam  HENT:      Head: Normocephalic and atraumatic.      Nose: No congestion.      Mouth/Throat:      Mouth: Mucous membranes are moist.   Eyes:      Conjunctiva/sclera: Conjunctivae normal.      Pupils: Pupils are equal, round, and reactive to light.   Neck:      Musculoskeletal: No muscular tenderness.   Cardiovascular:      Rate and Rhythm: Normal rate and regular rhythm.      Heart sounds: Murmur present.   Pulmonary:      Effort: Pulmonary effort  is normal.      Breath sounds: Normal breath sounds.   Abdominal:      General: Bowel sounds are normal. There is no distension.      Palpations: Abdomen is soft.      Tenderness: There is no abdominal tenderness. There is no guarding or rebound.   Musculoskeletal:      Right lower leg: Edema present.      Left lower leg: Edema present.   Lymphadenopathy:      Cervical: No cervical adenopathy.   Skin:     General: Skin is warm and dry.   Neurological:      General: No focal deficit present.      Mental Status: He is alert and oriented to person, place, and time.      Cranial Nerves: No cranial nerve deficit.         Fluids    Intake/Output Summary (Last 24 hours) at 2/21/2020 1129  Last data filed at 2/21/2020 0906  Gross per 24 hour   Intake 480 ml   Output --   Net 480 ml       Laboratory  Recent Labs     02/20/20  1150 02/20/20  1855 02/21/20  0332   WBC  --   --  4.2*   RBC  --   --  2.65*   HEMOGLOBIN 8.3* 8.8* 8.3*   HEMATOCRIT  --   --  25.9*   MCV  --   --  97.7   MCH  --   --  31.3   MCHC  --   --  32.0*   RDW  --   --  65.1*   PLATELETCT  --   --  201   MPV  --   --  9.4     Recent Labs     02/21/20  0332   SODIUM 142   POTASSIUM 4.1   CHLORIDE 102   CO2 26   GLUCOSE 95   BUN 49*   CREATININE 5.77*   CALCIUM 9.2     Recent Labs     02/19/20  0016   INR 1.08               Imaging  No orders to display        Assessment/Plan  * GI bleed- (present on admission)  Assessment & Plan  Capsule endoscopy results pending  IV Protonix  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 2/19/2020  Colonoscopy - Scattered medium sized diverticuli noted. Examination suboptimal to exclude flat lesions like angiodysplasia. 2/19/2020    Acute on chronic blood loss anemia- (present on admission)  Assessment & Plan  Transfused 2 u PRBC  Serial hgb    Paroxysmal atrial flutter (HCC)- (present on admission)  Assessment & Plan  Holding Coumadin    Severe aortic stenosis- (present on admission)  Assessment & Plan  watch  fluid status  Holding Torsemide    ESRD (end stage renal disease) on dialysis ([facility])- (present on admission)  Assessment & Plan  Consult Nephrology    PAD (peripheral artery disease) ([facility])- (present on admission)  Assessment & Plan  ASA, Crestor    Coronary artery disease- (present on admission)  Assessment & Plan  History of stent  ASA, Crestor  Hold Plavix - discussed with Cardiology    COPD (chronic obstructive pulmonary disease) ([facility])- (present on admission)  Assessment & Plan  Anoro, Flovent, RT protocol    HELGA (obstructive sleep apnea)- (present on admission)  Assessment & Plan  RT protocol    Dyslipidemia- (present on admission)  Assessment & Plan  Crestor       VTE prophylaxis: SCD

## 2020-02-21 NOTE — PROGRESS NOTES
Bedside shift report given. Patient is stable and Ax4. Bed in lowest position. Bedside table within reach. Call bell at bedside.

## 2020-02-21 NOTE — PROGRESS NOTES
On call GI MD called back. Stated he does not know the patients case and would prefer patient to be on clear liquid, but also stated that its okay to order patient GI soft diet.

## 2020-02-21 NOTE — CARE PLAN
Problem: Fluid Volume:  Goal: Will show no signs and symptoms of excessive bleeding  Outcome: PROGRESSING AS EXPECTED  Intervention: Monitor blood loss  Note: Pt had BM today, states it was darker brown, but not black and not fabiana blood present. Hgb stable, on frequent H/H checks.      Problem: Knowledge Deficit:  Goal: Ability to identify signs and symptoms of gastrointestinal bleeding will improve  Outcome: PROGRESSING AS EXPECTED  Intervention: Discuss information regarding disease process or condition  Note: GI on case, awaiting results from capsule testing done yesterday.

## 2020-02-21 NOTE — PROGRESS NOTES
Gastroenterology Progress Note     Author: ILAN Norman   Date & Time Created: 2020 9:19 AM    Chief Complaint:  Anemia    Interval History:  Last BM .  No abdominal pain, nausea, vomiting.  Hgb stable overnight.          Review of Systems:  Review of Systems   Constitutional: Negative for malaise/fatigue.   Cardiovascular: Negative for chest pain.   Gastrointestinal: Negative for abdominal pain, blood in stool, constipation, diarrhea, heartburn, melena, nausea and vomiting.       Physical Exam:  Physical Exam  HENT:      Head: Normocephalic.   Eyes:      Pupils: Pupils are equal, round, and reactive to light.   Cardiovascular:      Rate and Rhythm: Normal rate.      Heart sounds: Murmur present.   Abdominal:      General: Bowel sounds are normal.      Comments: Rounded, semifirm   Skin:     General: Skin is warm and dry.      Coloration: Skin is pale.   Neurological:      General: No focal deficit present.      Mental Status: He is alert and oriented to person, place, and time.   Psychiatric:         Mood and Affect: Mood normal.         Behavior: Behavior normal.         Thought Content: Thought content normal.         Judgment: Judgment normal.         Labs:          Recent Labs     20  0332   SODIUM 142   POTASSIUM 4.1   CHLORIDE 102   CO2 26   BUN 49*   CREATININE 5.77*   CALCIUM 9.2     Recent Labs     20  0332   GLUCOSE 95     Recent Labs     20  0016  20  1150 20  1855 20  0332   RBC  --   --   --   --  2.65*   HEMOGLOBIN 7.9*   < > 8.3* 8.8* 8.3*   HEMATOCRIT  --   --   --   --  25.9*   PLATELETCT  --   --   --   --  201   PROTHROMBTM 14.1  --   --   --   --    INR 1.08  --   --   --   --     < > = values in this interval not displayed.     Recent Labs     20  0332   WBC 4.2*   NEUTSPOLYS 69.30   LYMPHOCYTES 13.60*   MONOCYTES 12.10   EOSINOPHILS 4.30   BASOPHILS 0.50     Hemodynamics:  Temp (24hrs), Av.7 °C (98 °F), Min:36.2  °C (97.1 °F), Max:37.3 °C (99.1 °F)  Temperature: 37.2 °C (98.9 °F)  Pulse  Av.5  Min: 72  Max: 100   Blood Pressure : 112/80     Respiratory:    Respiration: (!) 21, Pulse Oximetry: 96 %        RUL Breath Sounds: Clear, RML Breath Sounds: Clear, RLL Breath Sounds: Diminished, SILVANA Breath Sounds: Clear, LLL Breath Sounds: Diminished  Fluids:    Intake/Output Summary (Last 24 hours) at 2020 1015  Last data filed at 2020 0215  Gross per 24 hour   Intake 300 ml   Output --   Net 300 ml     Weight: 73.7 kg (162 lb 7.7 oz)  GI/Nutrition:  Orders Placed This Encounter   Procedures   • Diet Order Low Fiber(GI Soft)     Standing Status:   Standing     Number of Occurrences:   1     Order Specific Question:   Diet:     Answer:   Low Fiber(GI Soft) [2]     Medical Decision Making, by Problem:  Active Hospital Problems    Diagnosis   • Acute on chronic blood loss anemia [D62]   • GI bleed [K92.2]   • Severe aortic stenosis [I35.0]   • ESRD (end stage renal disease) on dialysis ([facility]) [N18.6, Z99.2]   • Coronary artery disease [I25.10]   • Dyslipidemia [E78.5]   • COPD (chronic obstructive pulmonary disease) ([facility]) [J44.9]   • HELGA (obstructive sleep apnea) [G47.33]       Plan:  Capsule endoscopy read today.  Continue to monitor hgb, transfuse PRN to keep greater than 7.  Based on clinical course and PillCam study, he may require repeat colonoscopy after an extended bowel prep.  Further recommendations to follow pillcam read.         Quality-Core Measures

## 2020-02-22 LAB
ANION GAP SERPL CALC-SCNC: 14 MMOL/L (ref 0–11.9)
BASOPHILS # BLD AUTO: 0.5 % (ref 0–1.8)
BASOPHILS # BLD: 0.02 K/UL (ref 0–0.12)
BUN SERPL-MCNC: 41 MG/DL (ref 8–22)
CALCIUM SERPL-MCNC: 8.9 MG/DL (ref 8.5–10.5)
CHLORIDE SERPL-SCNC: 103 MMOL/L (ref 96–112)
CO2 SERPL-SCNC: 26 MMOL/L (ref 20–33)
CREAT SERPL-MCNC: 5.19 MG/DL (ref 0.5–1.4)
EOSINOPHIL # BLD AUTO: 0.15 K/UL (ref 0–0.51)
EOSINOPHIL NFR BLD: 3.8 % (ref 0–6.9)
ERYTHROCYTE [DISTWIDTH] IN BLOOD BY AUTOMATED COUNT: 67.9 FL (ref 35.9–50)
GLUCOSE SERPL-MCNC: 83 MG/DL (ref 65–99)
HCT VFR BLD AUTO: 27.6 % (ref 42–52)
HGB BLD-MCNC: 8.2 G/DL (ref 14–18)
HGB BLD-MCNC: 8.9 G/DL (ref 14–18)
IMM GRANULOCYTES # BLD AUTO: 0.01 K/UL (ref 0–0.11)
IMM GRANULOCYTES NFR BLD AUTO: 0.3 % (ref 0–0.9)
LYMPHOCYTES # BLD AUTO: 0.7 K/UL (ref 1–4.8)
LYMPHOCYTES NFR BLD: 17.7 % (ref 22–41)
MCH RBC QN AUTO: 30.6 PG (ref 27–33)
MCHC RBC AUTO-ENTMCNC: 29.7 G/DL (ref 33.7–35.3)
MCV RBC AUTO: 103 FL (ref 81.4–97.8)
MONOCYTES # BLD AUTO: 0.53 K/UL (ref 0–0.85)
MONOCYTES NFR BLD AUTO: 13.4 % (ref 0–13.4)
NEUTROPHILS # BLD AUTO: 2.55 K/UL (ref 1.82–7.42)
NEUTROPHILS NFR BLD: 64.3 % (ref 44–72)
NRBC # BLD AUTO: 0 K/UL
NRBC BLD-RTO: 0 /100 WBC
PLATELET # BLD AUTO: 201 K/UL (ref 164–446)
PMV BLD AUTO: 9.4 FL (ref 9–12.9)
POTASSIUM SERPL-SCNC: 4 MMOL/L (ref 3.6–5.5)
RBC # BLD AUTO: 2.68 M/UL (ref 4.7–6.1)
SODIUM SERPL-SCNC: 143 MMOL/L (ref 135–145)
WBC # BLD AUTO: 4 K/UL (ref 4.8–10.8)

## 2020-02-22 PROCEDURE — 770020 HCHG ROOM/CARE - TELE (206)

## 2020-02-22 PROCEDURE — 700111 HCHG RX REV CODE 636 W/ 250 OVERRIDE (IP): Performed by: INTERNAL MEDICINE

## 2020-02-22 PROCEDURE — 85025 COMPLETE CBC W/AUTO DIFF WBC: CPT

## 2020-02-22 PROCEDURE — 700102 HCHG RX REV CODE 250 W/ 637 OVERRIDE(OP): Performed by: FAMILY MEDICINE

## 2020-02-22 PROCEDURE — 80048 BASIC METABOLIC PNL TOTAL CA: CPT

## 2020-02-22 PROCEDURE — 36415 COLL VENOUS BLD VENIPUNCTURE: CPT

## 2020-02-22 PROCEDURE — A9270 NON-COVERED ITEM OR SERVICE: HCPCS | Performed by: INTERNAL MEDICINE

## 2020-02-22 PROCEDURE — 99233 SBSQ HOSP IP/OBS HIGH 50: CPT | Performed by: FAMILY MEDICINE

## 2020-02-22 PROCEDURE — 700101 HCHG RX REV CODE 250: Performed by: INTERNAL MEDICINE

## 2020-02-22 PROCEDURE — 700102 HCHG RX REV CODE 250 W/ 637 OVERRIDE(OP): Performed by: INTERNAL MEDICINE

## 2020-02-22 PROCEDURE — C9113 INJ PANTOPRAZOLE SODIUM, VIA: HCPCS | Performed by: INTERNAL MEDICINE

## 2020-02-22 PROCEDURE — 85018 HEMOGLOBIN: CPT

## 2020-02-22 PROCEDURE — A9270 NON-COVERED ITEM OR SERVICE: HCPCS | Performed by: FAMILY MEDICINE

## 2020-02-22 RX ADMIN — FLUTICASONE PROPIONATE 88 MCG: 44 AEROSOL, METERED RESPIRATORY (INHALATION) at 05:59

## 2020-02-22 RX ADMIN — POLYETHYLENE GLYCOL 3350, SODIUM SULFATE ANHYDROUS, SODIUM BICARBONATE, SODIUM CHLORIDE, POTASSIUM CHLORIDE 4 L: 236; 22.74; 6.74; 5.86; 2.97 POWDER, FOR SOLUTION ORAL at 16:58

## 2020-02-22 RX ADMIN — Medication 667 MG: at 16:57

## 2020-02-22 RX ADMIN — PANTOPRAZOLE SODIUM 40 MG: 40 INJECTION, POWDER, LYOPHILIZED, FOR SOLUTION INTRAVENOUS at 05:59

## 2020-02-22 RX ADMIN — ASPIRIN 81 MG 81 MG: 81 TABLET ORAL at 06:01

## 2020-02-22 RX ADMIN — ROSUVASTATIN CALCIUM 40 MG: 20 TABLET, FILM COATED ORAL at 16:57

## 2020-02-22 RX ADMIN — PANTOPRAZOLE SODIUM 40 MG: 40 INJECTION, POWDER, LYOPHILIZED, FOR SOLUTION INTRAVENOUS at 22:01

## 2020-02-22 RX ADMIN — ROPINIROLE HYDROCHLORIDE 1 MG: 2 TABLET, FILM COATED ORAL at 22:01

## 2020-02-22 RX ADMIN — TAMSULOSIN HYDROCHLORIDE 0.8 MG: 0.4 CAPSULE ORAL at 06:00

## 2020-02-22 RX ADMIN — TRAZODONE HYDROCHLORIDE 300 MG: 150 TABLET ORAL at 22:01

## 2020-02-22 RX ADMIN — MONTELUKAST 10 MG: 10 TABLET, FILM COATED ORAL at 16:57

## 2020-02-22 RX ADMIN — FLUTICASONE PROPIONATE 100 MCG: 50 SPRAY, METERED NASAL at 05:59

## 2020-02-22 RX ADMIN — Medication 667 MG: at 06:00

## 2020-02-22 RX ADMIN — UMECLIDINIUM BROMIDE AND VILANTEROL TRIFENATATE 1 PUFF: 62.5; 25 POWDER RESPIRATORY (INHALATION) at 06:00

## 2020-02-22 ASSESSMENT — ENCOUNTER SYMPTOMS
FLANK PAIN: 0
DIZZINESS: 0
CHILLS: 0
DIAPHORESIS: 0
SENSORY CHANGE: 0
SHORTNESS OF BREATH: 0
NAUSEA: 0
VOMITING: 0
BLURRED VISION: 0
WHEEZING: 0
HEARTBURN: 0
DIARRHEA: 0
HEADACHES: 0
SPEECH CHANGE: 0
ABDOMINAL PAIN: 0
PALPITATIONS: 0
NERVOUS/ANXIOUS: 0
FEVER: 0
WEAKNESS: 1
SORE THROAT: 0
FOCAL WEAKNESS: 0
BLOOD IN STOOL: 0
COUGH: 0
NECK PAIN: 0
BACK PAIN: 0

## 2020-02-22 NOTE — CARE PLAN
Problem: Communication  Goal: The ability to communicate needs accurately and effectively will improve  Outcome: PROGRESSING AS EXPECTED     Problem: Safety  Goal: Will remain free from injury  Outcome: PROGRESSING AS EXPECTED  Goal: Will remain free from falls  Outcome: PROGRESSING AS EXPECTED     Problem: Infection  Goal: Will remain free from infection  Outcome: PROGRESSING AS EXPECTED     Problem: Venous Thromboembolism (VTW)/Deep Vein Thrombosis (DVT) Prevention:  Goal: Patient will participate in Venous Thrombosis (VTE)/Deep Vein Thrombosis (DVT)Prevention Measures  Outcome: PROGRESSING AS EXPECTED     Problem: Bowel/Gastric:  Goal: Normal bowel function is maintained or improved  Outcome: PROGRESSING AS EXPECTED  Goal: Will not experience complications related to bowel motility  Outcome: PROGRESSING AS EXPECTED     Problem: Knowledge Deficit  Goal: Knowledge of disease process/condition, treatment plan, diagnostic tests, and medications will improve  Outcome: PROGRESSING AS EXPECTED  Goal: Knowledge of the prescribed therapeutic regimen will improve  Outcome: PROGRESSING AS EXPECTED     Problem: Discharge Barriers/Planning  Goal: Patient's continuum of care needs will be met  Outcome: PROGRESSING AS EXPECTED     Problem: Respiratory:  Goal: Respiratory status will improve  Outcome: PROGRESSING AS EXPECTED     Problem: Pain Management  Goal: Pain level will decrease to patient's comfort goal  Outcome: PROGRESSING AS EXPECTED     Problem: Fluid Volume:  Goal: Will show no signs and symptoms of excessive bleeding  Outcome: PROGRESSING AS EXPECTED     Problem: Bowel/Gastric:  Goal: Will show no signs and symptoms of gastrointestinal bleeding  Outcome: PROGRESSING AS EXPECTED     Problem: Knowledge Deficit:  Goal: Ability to identify signs and symptoms of gastrointestinal bleeding will improve  Outcome: PROGRESSING AS EXPECTED

## 2020-02-22 NOTE — PROGRESS NOTES
[facility] Medicine Daily Progress Note    Date of Service  2/22/2020    Chief Complaint  79 y.o. male admitted 2/19/2020 with GI bleeding.    Hospital Course  Admitted with GI bleeding, Anemia requiring transfusion.    Interval Problem Update  GIB - capsule endoscopy showed 2 bleeding foci seen - proximal small bowel and cecum  Anemia - hgb 8.2  Aflutter - currently SR    Consultants/Specialty  Gastroenterology  Cardiology  Nephrology    Code Status  Full    Disposition  TBD    Review of Systems  Review of Systems   Constitutional: Positive for malaise/fatigue. Negative for chills, diaphoresis and fever.   HENT: Negative for hearing loss and sore throat.    Eyes: Negative for blurred vision.   Respiratory: Negative for cough, shortness of breath and wheezing.    Cardiovascular: Negative for chest pain, palpitations and leg swelling.   Gastrointestinal: Negative for abdominal pain, blood in stool, diarrhea, heartburn, melena, nausea and vomiting.   Genitourinary: Negative for dysuria, flank pain and hematuria.   Musculoskeletal: Negative for back pain and neck pain.   Skin: Negative for rash.   Neurological: Positive for weakness. Negative for dizziness, sensory change, speech change, focal weakness and headaches.   Psychiatric/Behavioral: The patient is not nervous/anxious.         Physical Exam  Temp:  [36.9 °C (98.4 °F)-37.2 °C (99 °F)] 36.9 °C (98.4 °F)  Pulse:  [] 107  Resp:  [16-18] 18  BP: (133-155)/(55-74) 155/72  SpO2:  [94 %-99 %] 94 %    Physical Exam  HENT:      Head: Normocephalic and atraumatic.      Nose: No congestion.      Mouth/Throat:      Mouth: Mucous membranes are moist.   Eyes:      Conjunctiva/sclera: Conjunctivae normal.      Pupils: Pupils are equal, round, and reactive to light.   Neck:      Musculoskeletal: No muscular tenderness.   Cardiovascular:      Rate and Rhythm: Normal rate and regular rhythm.      Heart sounds: Murmur present.   Pulmonary:      Effort: Pulmonary effort is  normal.      Breath sounds: Normal breath sounds.   Abdominal:      General: Bowel sounds are normal. There is no distension.      Palpations: Abdomen is soft.      Tenderness: There is no abdominal tenderness. There is no guarding or rebound.   Musculoskeletal:      Right lower leg: Edema present.      Left lower leg: Edema present.   Lymphadenopathy:      Cervical: No cervical adenopathy.   Skin:     General: Skin is warm and dry.   Neurological:      General: No focal deficit present.      Mental Status: He is alert and oriented to person, place, and time.      Cranial Nerves: No cranial nerve deficit.         Fluids    Intake/Output Summary (Last 24 hours) at 2/22/2020 1306  Last data filed at 2/21/2020 1433  Gross per 24 hour   Intake 740 ml   Output 3500 ml   Net -2760 ml       Laboratory  Recent Labs     02/21/20  0332 02/21/20  1110 02/22/20  0400   WBC 4.2*  --  4.0*   RBC 2.65*  --  2.68*   HEMOGLOBIN 8.3* 8.1* 8.2*   HEMATOCRIT 25.9*  --  27.6*   MCV 97.7  --  103.0*   MCH 31.3  --  30.6   MCHC 32.0*  --  29.7*   RDW 65.1*  --  67.9*   PLATELETCT 201  --  201   MPV 9.4  --  9.4     Recent Labs     02/21/20  0332 02/22/20  0400   SODIUM 142 143   POTASSIUM 4.1 4.0   CHLORIDE 102 103   CO2 26 26   GLUCOSE 95 83   BUN 49* 41*   CREATININE 5.77* 5.19*   CALCIUM 9.2 8.9                   Imaging  No orders to display        Assessment/Plan  * GI bleed- (present on admission)  Assessment & Plan  IV Protonix  Capsule endoscopy -  2 bleeding foci seen - proximal small bowel and cecum. 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 2/19/2020  Colonoscopy - Scattered medium sized diverticuli noted. Examination suboptimal to exclude flat lesions like angiodysplasia. 2/19/2020  Plan for EGD w/ push enteroscopy and Colonoscopy.    Acute on chronic blood loss anemia- (present on admission)  Assessment & Plan  Transfused 2 u PRBC  Serial hgb    Paroxysmal atrial flutter (HCC)- (present on  admission)  Assessment & Plan  Holding Coumadin    Severe aortic stenosis- (present on admission)  Assessment & Plan  watch fluid status  Holding Torsemide    ESRD (end stage renal disease) on dialysis ([facility])- (present on admission)  Assessment & Plan  HD MWF    PAD (peripheral artery disease) ([facility])- (present on admission)  Assessment & Plan  ASA, Crestor    Coronary artery disease- (present on admission)  Assessment & Plan  History of stent  ASA, Crestor  Hold Plavix - discussed with Cardiology    COPD (chronic obstructive pulmonary disease) ([facility])- (present on admission)  Assessment & Plan  Anoro, Flovent, RT protocol    HELGA (obstructive sleep apnea)- (present on admission)  Assessment & Plan  RT protocol    Dyslipidemia- (present on admission)  Assessment & Plan  Crestor       VTE prophylaxis: SCD

## 2020-02-22 NOTE — PROGRESS NOTES
Gastroenterology Progress Note     Author: Enrique Child D.O.   Date & Time Created: 2020 10:26 AM    Chief Complaint:  GI bleed    Interval History:  No overnight events. Denies fever, chills, chest pain, SOB, GI bleed.     Review of Systems:  ROS    Physical Exam:  Physical Exam  Constitutional:       Appearance: Normal appearance.   HENT:      Head: Normocephalic.      Nose: Nose normal.   Neck:      Musculoskeletal: Normal range of motion.   Cardiovascular:      Rate and Rhythm: Normal rate and regular rhythm.      Pulses: Normal pulses.      Heart sounds: Normal heart sounds.   Pulmonary:      Effort: Pulmonary effort is normal.      Breath sounds: Normal breath sounds.   Abdominal:      General: Abdomen is flat. There is no distension.      Palpations: Abdomen is soft.      Tenderness: There is no abdominal tenderness. There is no guarding or rebound.   Skin:     General: Skin is warm and dry.      Coloration: Skin is pale.   Neurological:      General: No focal deficit present.      Mental Status: He is alert and oriented to person, place, and time.   Psychiatric:         Mood and Affect: Mood normal.         Behavior: Behavior normal.         Labs:          Recent Labs     20  0332 20  0400   SODIUM 142 143   POTASSIUM 4.1 4.0   CHLORIDE 102 103   CO2 26 26   BUN 49* 41*   CREATININE 5.77* 5.19*   CALCIUM 9.2 8.9     Recent Labs     20  0332 20  0400   GLUCOSE 95 83     Recent Labs     20  0332 20  1110 20  0400   RBC 2.65*  --  2.68*   HEMOGLOBIN 8.3* 8.1* 8.2*   HEMATOCRIT 25.9*  --  27.6*   PLATELETCT 201  --  201     Recent Labs     20  0332 20  0400   WBC 4.2* 4.0*   NEUTSPOLYS 69.30 64.30   LYMPHOCYTES 13.60* 17.70*   MONOCYTES 12.10 13.40   EOSINOPHILS 4.30 3.80   BASOPHILS 0.50 0.50     Hemodynamics:  Temp (24hrs), Av.1 °C (98.7 °F), Min:36.9 °C (98.4 °F), Max:37.2 °C (99 °F)  Temperature: 36.9 °C (98.4 °F)  Pulse  Av.3  Min: 72   Max: 107   Blood Pressure : 155/72     Respiratory:    Respiration: 18, Pulse Oximetry: 94 %        RUL Breath Sounds: Clear, RML Breath Sounds: Clear, RLL Breath Sounds: Diminished, SILVANA Breath Sounds: Clear, LLL Breath Sounds: Diminished  Fluids:    Intake/Output Summary (Last 24 hours) at 2/22/2020 1026  Last data filed at 2/21/2020 1433  Gross per 24 hour   Intake 740 ml   Output 3500 ml   Net -2760 ml     Weight: 74.2 kg (163 lb 9.3 oz)  GI/Nutrition:  Orders Placed This Encounter   Procedures   • Diet Order Low Fiber(GI Soft)     Standing Status:   Standing     Number of Occurrences:   1     Order Specific Question:   Diet:     Answer:   Low Fiber(GI Soft) [2]     Medical Decision Making, by Problem:  Active Hospital Problems    Diagnosis   • *GI bleed [K92.2]   • Acute on chronic blood loss anemia [D62]   • Paroxysmal atrial flutter ([facility]) [I48.92]   • Severe aortic stenosis [I35.0]   • ESRD (end stage renal disease) on dialysis ([facility]) [N18.6, Z99.2]   • PAD (peripheral artery disease) ([facility]) [I73.9]   • Coronary artery disease [I25.10]   • COPD (chronic obstructive pulmonary disease) ([facility]) [J44.9]   • HELGA (obstructive sleep apnea) [G47.33]   • Dyslipidemia [E78.5]       Plan:  1.  See VCE report - 2 bleeding foci seen - proximal small bowel and cecum. Plan for extended prep today & tomorrow for EGD w/ push enteroscopy/Colonoscopy. I suspect patient will not be cleaned out adequately, and it will be rescheduled for Monday, as patient had an inadequate prep several days ago.   2.  Gi bleed - see above  3.  Anemia - see above. Transfuse PRN Hb <7.   4.  Gastric AVM - may require APC treatment during push enteroscopy/colonoscopy.   5.  Call w/ questions/concerns. Clear liquid today, golytely this evening.     Quality-Core Measures

## 2020-02-22 NOTE — PROGRESS NOTES
3hr HD started @ 1314 and completed @ 1616,tx well tolerated,VSS.Net UF = 3000ml.RUAAVF + B/T,Cannulation sites covered with DD,CDI,report given to Korin Hutson RN.

## 2020-02-23 ENCOUNTER — ANESTHESIA EVENT (OUTPATIENT)
Dept: SURGERY | Facility: MEDICAL CENTER | Age: 80
DRG: 377 | End: 2020-02-23
Payer: MEDICARE

## 2020-02-23 ENCOUNTER — ANESTHESIA (OUTPATIENT)
Dept: SURGERY | Facility: MEDICAL CENTER | Age: 80
DRG: 377 | End: 2020-02-23
Payer: MEDICARE

## 2020-02-23 LAB
ANION GAP SERPL CALC-SCNC: 15 MMOL/L (ref 0–11.9)
ANISOCYTOSIS BLD QL SMEAR: ABNORMAL
BASOPHILS # BLD AUTO: 0.5 % (ref 0–1.8)
BASOPHILS # BLD: 0.02 K/UL (ref 0–0.12)
BUN SERPL-MCNC: 47 MG/DL (ref 8–22)
CALCIUM SERPL-MCNC: 9.5 MG/DL (ref 8.5–10.5)
CHLORIDE SERPL-SCNC: 105 MMOL/L (ref 96–112)
CO2 SERPL-SCNC: 27 MMOL/L (ref 20–33)
COMMENT 1642: NORMAL
CREAT SERPL-MCNC: 6.52 MG/DL (ref 0.5–1.4)
EOSINOPHIL # BLD AUTO: 0.24 K/UL (ref 0–0.51)
EOSINOPHIL NFR BLD: 6.4 % (ref 0–6.9)
ERYTHROCYTE [DISTWIDTH] IN BLOOD BY AUTOMATED COUNT: 68 FL (ref 35.9–50)
GLUCOSE SERPL-MCNC: 84 MG/DL (ref 65–99)
HCT VFR BLD AUTO: 26.4 % (ref 42–52)
HGB BLD-MCNC: 7.7 G/DL (ref 14–18)
HGB BLD-MCNC: 8.1 G/DL (ref 14–18)
HGB BLD-MCNC: 8.1 G/DL (ref 14–18)
IMM GRANULOCYTES # BLD AUTO: 0.01 K/UL (ref 0–0.11)
IMM GRANULOCYTES NFR BLD AUTO: 0.3 % (ref 0–0.9)
LYMPHOCYTES # BLD AUTO: 0.6 K/UL (ref 1–4.8)
LYMPHOCYTES NFR BLD: 16 % (ref 22–41)
MACROCYTES BLD QL SMEAR: ABNORMAL
MCH RBC QN AUTO: 31 PG (ref 27–33)
MCHC RBC AUTO-ENTMCNC: 30.7 G/DL (ref 33.7–35.3)
MCV RBC AUTO: 101.1 FL (ref 81.4–97.8)
MICROCYTES BLD QL SMEAR: ABNORMAL
MONOCYTES # BLD AUTO: 0.46 K/UL (ref 0–0.85)
MONOCYTES NFR BLD AUTO: 12.3 % (ref 0–13.4)
MORPHOLOGY BLD-IMP: NORMAL
NEUTROPHILS # BLD AUTO: 2.41 K/UL (ref 1.82–7.42)
NEUTROPHILS NFR BLD: 64.5 % (ref 44–72)
NRBC # BLD AUTO: 0 K/UL
NRBC BLD-RTO: 0 /100 WBC
OVALOCYTES BLD QL SMEAR: NORMAL
PLATELET # BLD AUTO: 202 K/UL (ref 164–446)
PLATELET BLD QL SMEAR: NORMAL
PMV BLD AUTO: 9.5 FL (ref 9–12.9)
POIKILOCYTOSIS BLD QL SMEAR: NORMAL
POTASSIUM SERPL-SCNC: 4.4 MMOL/L (ref 3.6–5.5)
RBC # BLD AUTO: 2.61 M/UL (ref 4.7–6.1)
RBC BLD AUTO: PRESENT
SODIUM SERPL-SCNC: 147 MMOL/L (ref 135–145)
WBC # BLD AUTO: 3.7 K/UL (ref 4.8–10.8)

## 2020-02-23 PROCEDURE — 80048 BASIC METABOLIC PNL TOTAL CA: CPT

## 2020-02-23 PROCEDURE — 700101 HCHG RX REV CODE 250: Performed by: ANESTHESIOLOGY

## 2020-02-23 PROCEDURE — 0DJD8ZZ INSPECTION OF LOWER INTESTINAL TRACT, VIA NATURAL OR ARTIFICIAL OPENING ENDOSCOPIC: ICD-10-PCS | Performed by: INTERNAL MEDICINE

## 2020-02-23 PROCEDURE — 700101 HCHG RX REV CODE 250: Performed by: INTERNAL MEDICINE

## 2020-02-23 PROCEDURE — 700102 HCHG RX REV CODE 250 W/ 637 OVERRIDE(OP): Performed by: FAMILY MEDICINE

## 2020-02-23 PROCEDURE — A9270 NON-COVERED ITEM OR SERVICE: HCPCS | Performed by: INTERNAL MEDICINE

## 2020-02-23 PROCEDURE — C9113 INJ PANTOPRAZOLE SODIUM, VIA: HCPCS | Performed by: INTERNAL MEDICINE

## 2020-02-23 PROCEDURE — 700111 HCHG RX REV CODE 636 W/ 250 OVERRIDE (IP): Performed by: INTERNAL MEDICINE

## 2020-02-23 PROCEDURE — A9270 NON-COVERED ITEM OR SERVICE: HCPCS | Performed by: FAMILY MEDICINE

## 2020-02-23 PROCEDURE — 160048 HCHG OR STATISTICAL LEVEL 1-5: Performed by: INTERNAL MEDICINE

## 2020-02-23 PROCEDURE — 99232 SBSQ HOSP IP/OBS MODERATE 35: CPT | Performed by: FAMILY MEDICINE

## 2020-02-23 PROCEDURE — 700105 HCHG RX REV CODE 258: Performed by: ANESTHESIOLOGY

## 2020-02-23 PROCEDURE — 160202 HCHG ENDO MINUTES - 1ST 30 MINS LEVEL 3: Performed by: INTERNAL MEDICINE

## 2020-02-23 PROCEDURE — 0DJ08ZZ INSPECTION OF UPPER INTESTINAL TRACT, VIA NATURAL OR ARTIFICIAL OPENING ENDOSCOPIC: ICD-10-PCS | Performed by: INTERNAL MEDICINE

## 2020-02-23 PROCEDURE — 700111 HCHG RX REV CODE 636 W/ 250 OVERRIDE (IP): Performed by: ANESTHESIOLOGY

## 2020-02-23 PROCEDURE — 770020 HCHG ROOM/CARE - TELE (206)

## 2020-02-23 PROCEDURE — 700102 HCHG RX REV CODE 250 W/ 637 OVERRIDE(OP): Performed by: INTERNAL MEDICINE

## 2020-02-23 PROCEDURE — 500066 HCHG BITE BLOCK, ECT: Performed by: INTERNAL MEDICINE

## 2020-02-23 PROCEDURE — 36415 COLL VENOUS BLD VENIPUNCTURE: CPT

## 2020-02-23 PROCEDURE — 85025 COMPLETE CBC W/AUTO DIFF WBC: CPT

## 2020-02-23 PROCEDURE — 85018 HEMOGLOBIN: CPT | Mod: 91

## 2020-02-23 PROCEDURE — 160009 HCHG ANES TIME/MIN: Performed by: INTERNAL MEDICINE

## 2020-02-23 PROCEDURE — 160207 HCHG ENDO MINUTES - EA ADDL 1 MIN LEVEL 3: Performed by: INTERNAL MEDICINE

## 2020-02-23 PROCEDURE — 160036 HCHG PACU - EA ADDL 30 MINS PHASE I: Performed by: INTERNAL MEDICINE

## 2020-02-23 PROCEDURE — 160035 HCHG PACU - 1ST 60 MINS PHASE I: Performed by: INTERNAL MEDICINE

## 2020-02-23 PROCEDURE — 160002 HCHG RECOVERY MINUTES (STAT): Performed by: INTERNAL MEDICINE

## 2020-02-23 RX ORDER — OXYCODONE HCL 5 MG/5 ML
10 SOLUTION, ORAL ORAL
Status: DISCONTINUED | OUTPATIENT
Start: 2020-02-23 | End: 2020-02-23 | Stop reason: HOSPADM

## 2020-02-23 RX ORDER — HYDROMORPHONE HYDROCHLORIDE 1 MG/ML
0.2 INJECTION, SOLUTION INTRAMUSCULAR; INTRAVENOUS; SUBCUTANEOUS
Status: DISCONTINUED | OUTPATIENT
Start: 2020-02-23 | End: 2020-02-23 | Stop reason: HOSPADM

## 2020-02-23 RX ORDER — DIPHENHYDRAMINE HYDROCHLORIDE 50 MG/ML
12.5 INJECTION INTRAMUSCULAR; INTRAVENOUS
Status: DISCONTINUED | OUTPATIENT
Start: 2020-02-23 | End: 2020-02-23 | Stop reason: HOSPADM

## 2020-02-23 RX ORDER — ONDANSETRON 2 MG/ML
4 INJECTION INTRAMUSCULAR; INTRAVENOUS
Status: DISCONTINUED | OUTPATIENT
Start: 2020-02-23 | End: 2020-02-23 | Stop reason: HOSPADM

## 2020-02-23 RX ORDER — OXYCODONE HCL 5 MG/5 ML
5 SOLUTION, ORAL ORAL
Status: DISCONTINUED | OUTPATIENT
Start: 2020-02-23 | End: 2020-02-23 | Stop reason: HOSPADM

## 2020-02-23 RX ORDER — HYDROMORPHONE HYDROCHLORIDE 1 MG/ML
1 INJECTION, SOLUTION INTRAMUSCULAR; INTRAVENOUS; SUBCUTANEOUS
Status: DISCONTINUED | OUTPATIENT
Start: 2020-02-23 | End: 2020-02-23 | Stop reason: HOSPADM

## 2020-02-23 RX ORDER — ROCURONIUM BROMIDE 10 MG/ML
INJECTION, SOLUTION INTRAVENOUS PRN
Status: DISCONTINUED | OUTPATIENT
Start: 2020-02-23 | End: 2020-02-23 | Stop reason: SURG

## 2020-02-23 RX ORDER — HYDROMORPHONE HYDROCHLORIDE 1 MG/ML
0.4 INJECTION, SOLUTION INTRAMUSCULAR; INTRAVENOUS; SUBCUTANEOUS
Status: DISCONTINUED | OUTPATIENT
Start: 2020-02-23 | End: 2020-02-23 | Stop reason: HOSPADM

## 2020-02-23 RX ORDER — SODIUM CHLORIDE, SODIUM LACTATE, POTASSIUM CHLORIDE, CALCIUM CHLORIDE 600; 310; 30; 20 MG/100ML; MG/100ML; MG/100ML; MG/100ML
INJECTION, SOLUTION INTRAVENOUS
Status: DISCONTINUED | OUTPATIENT
Start: 2020-02-23 | End: 2020-02-23 | Stop reason: SURG

## 2020-02-23 RX ORDER — HALOPERIDOL 5 MG/ML
1 INJECTION INTRAMUSCULAR
Status: DISCONTINUED | OUTPATIENT
Start: 2020-02-23 | End: 2020-02-23 | Stop reason: HOSPADM

## 2020-02-23 RX ORDER — LIDOCAINE HYDROCHLORIDE 40 MG/ML
SOLUTION TOPICAL PRN
Status: DISCONTINUED | OUTPATIENT
Start: 2020-02-23 | End: 2020-02-23 | Stop reason: SURG

## 2020-02-23 RX ORDER — ENALAPRILAT 1.25 MG/ML
1.25 INJECTION INTRAVENOUS EVERY 4 HOURS PRN
Status: DISCONTINUED | OUTPATIENT
Start: 2020-02-23 | End: 2020-02-26 | Stop reason: HOSPADM

## 2020-02-23 RX ORDER — MEPERIDINE HYDROCHLORIDE 25 MG/ML
12.5 INJECTION INTRAMUSCULAR; INTRAVENOUS; SUBCUTANEOUS
Status: DISCONTINUED | OUTPATIENT
Start: 2020-02-23 | End: 2020-02-23 | Stop reason: HOSPADM

## 2020-02-23 RX ORDER — MIDAZOLAM HYDROCHLORIDE 1 MG/ML
1 INJECTION INTRAMUSCULAR; INTRAVENOUS
Status: DISCONTINUED | OUTPATIENT
Start: 2020-02-23 | End: 2020-02-23 | Stop reason: HOSPADM

## 2020-02-23 RX ORDER — SODIUM CHLORIDE, SODIUM GLUCONATE, SODIUM ACETATE, POTASSIUM CHLORIDE AND MAGNESIUM CHLORIDE 526; 502; 368; 37; 30 MG/100ML; MG/100ML; MG/100ML; MG/100ML; MG/100ML
500 INJECTION, SOLUTION INTRAVENOUS CONTINUOUS
Status: DISCONTINUED | OUTPATIENT
Start: 2020-02-23 | End: 2020-02-23 | Stop reason: HOSPADM

## 2020-02-23 RX ORDER — IPRATROPIUM BROMIDE AND ALBUTEROL SULFATE 2.5; .5 MG/3ML; MG/3ML
3 SOLUTION RESPIRATORY (INHALATION)
Status: DISCONTINUED | OUTPATIENT
Start: 2020-02-23 | End: 2020-02-23 | Stop reason: HOSPADM

## 2020-02-23 RX ORDER — LIDOCAINE HYDROCHLORIDE 20 MG/ML
INJECTION, SOLUTION EPIDURAL; INFILTRATION; INTRACAUDAL; PERINEURAL PRN
Status: DISCONTINUED | OUTPATIENT
Start: 2020-02-23 | End: 2020-02-23 | Stop reason: SURG

## 2020-02-23 RX ADMIN — PANTOPRAZOLE SODIUM 40 MG: 40 INJECTION, POWDER, LYOPHILIZED, FOR SOLUTION INTRAVENOUS at 05:32

## 2020-02-23 RX ADMIN — Medication 667 MG: at 05:32

## 2020-02-23 RX ADMIN — LIDOCAINE HYDROCHLORIDE 50 MG: 20 INJECTION, SOLUTION EPIDURAL; INFILTRATION; INTRACAUDAL at 10:35

## 2020-02-23 RX ADMIN — SUGAMMADEX 200 MG: 100 INJECTION, SOLUTION INTRAVENOUS at 10:49

## 2020-02-23 RX ADMIN — ASPIRIN 81 MG 81 MG: 81 TABLET ORAL at 05:32

## 2020-02-23 RX ADMIN — ROPINIROLE HYDROCHLORIDE 1 MG: 2 TABLET, FILM COATED ORAL at 22:49

## 2020-02-23 RX ADMIN — PANTOPRAZOLE SODIUM 40 MG: 40 INJECTION, POWDER, LYOPHILIZED, FOR SOLUTION INTRAVENOUS at 20:18

## 2020-02-23 RX ADMIN — SODIUM CHLORIDE, POTASSIUM CHLORIDE, SODIUM LACTATE AND CALCIUM CHLORIDE: 600; 310; 30; 20 INJECTION, SOLUTION INTRAVENOUS at 10:25

## 2020-02-23 RX ADMIN — UMECLIDINIUM BROMIDE AND VILANTEROL TRIFENATATE 1 PUFF: 62.5; 25 POWDER RESPIRATORY (INHALATION) at 05:32

## 2020-02-23 RX ADMIN — ROCURONIUM BROMIDE 50 MG: 10 INJECTION, SOLUTION INTRAVENOUS at 10:35

## 2020-02-23 RX ADMIN — TAMSULOSIN HYDROCHLORIDE 0.8 MG: 0.4 CAPSULE ORAL at 05:32

## 2020-02-23 RX ADMIN — BENZONATATE 100 MG: 100 CAPSULE ORAL at 22:53

## 2020-02-23 RX ADMIN — POLYETHYLENE GLYCOL 3350, SODIUM SULFATE ANHYDROUS, SODIUM BICARBONATE, SODIUM CHLORIDE, POTASSIUM CHLORIDE 4 L: 236; 22.74; 6.74; 5.86; 2.97 POWDER, FOR SOLUTION ORAL at 16:00

## 2020-02-23 RX ADMIN — PROPOFOL 150 MG: 10 INJECTION, EMULSION INTRAVENOUS at 10:35

## 2020-02-23 RX ADMIN — LIDOCAINE HYDROCHLORIDE 4 ML: 40 SOLUTION TOPICAL at 10:35

## 2020-02-23 RX ADMIN — BENZONATATE 100 MG: 100 CAPSULE ORAL at 05:32

## 2020-02-23 RX ADMIN — ENALAPRILAT 1.25 MG: 1.25 INJECTION INTRAVENOUS at 21:55

## 2020-02-23 RX ADMIN — MONTELUKAST 10 MG: 10 TABLET, FILM COATED ORAL at 17:17

## 2020-02-23 RX ADMIN — ROSUVASTATIN CALCIUM 40 MG: 20 TABLET, FILM COATED ORAL at 17:17

## 2020-02-23 RX ADMIN — TRAZODONE HYDROCHLORIDE 300 MG: 150 TABLET ORAL at 22:49

## 2020-02-23 RX ADMIN — FLUTICASONE PROPIONATE 88 MCG: 44 AEROSOL, METERED RESPIRATORY (INHALATION) at 05:32

## 2020-02-23 RX ADMIN — Medication 667 MG: at 17:16

## 2020-02-23 ASSESSMENT — ENCOUNTER SYMPTOMS
BACK PAIN: 0
VOMITING: 0
WHEEZING: 0
DIARRHEA: 0
CHILLS: 0
HEARTBURN: 0
SENSORY CHANGE: 0
FLANK PAIN: 0
NAUSEA: 0
NERVOUS/ANXIOUS: 0
FEVER: 0
HEADACHES: 0
PALPITATIONS: 0
SPEECH CHANGE: 0
SORE THROAT: 0
BLURRED VISION: 0
COUGH: 0
ABDOMINAL PAIN: 0
NECK PAIN: 0
FOCAL WEAKNESS: 0
DIZZINESS: 0
WEAKNESS: 1
BLOOD IN STOOL: 0
DIAPHORESIS: 0
SHORTNESS OF BREATH: 0

## 2020-02-23 ASSESSMENT — PAIN SCALES - GENERAL: PAIN_LEVEL: 0

## 2020-02-23 NOTE — ANESTHESIA QCDR
2019 [facility] Clinical Data Registry (for Quality Improvement)     Postoperative nausea/vomiting risk protocol (Adult = 18 yrs and Pediatric 3-17 yrs)- (430 and 463)  General inhalation anesthetic (NOT TIVA) with PONV risk factors: Yes  Provision of anti-emetic therapy with at least 2 different classes of agents: Yes   Patient DID NOT receive anti-emetic therapy and reason is documented in Medical Record:  N/A    Multimodal Pain Management- (477)  Non-emergent surgery AND patient age >= 18: No  Use of Multimodal Pain Management, two or more drugs and/or interventions, NOT including systemic opioids:   Exception: Documented allergy to multiple classes of analgesics:     Smoking Abstinence (404)  Patient is current smoker (cigarette, pipe, e-cig, marijuanna): No  Elective Surgery:   Abstinence instructions provided prior to day of surgery:   Patient abstained from smoking on day of surgery:     Pre-Op Beta-Blocker in Isolated CABG (44)  Isolated CABG AND patient age >= 18: No  Beta-blocker admin within 24 hours of surgical incision:   Exception:of medical reason(s) for not administering beta blocker within 24 hours prior to surgical incision (e.g., not  indicated,other medical reason):     PACU assessment of acute postoperative pain prior to Anesthesia Care End- Applies to Patients Age = 18- (ABG7)  Initial PACU pain score is which of the following: < 7/10  Patient unable to report pain score: N/A    Post-anesthetic transfer of care checklist/protocol to PACU/ICU- (426 and 427)  Upon conclusion of case, patient transferred to which of the following locations: PACU/Non-ICU  Use of transfer checklist/protocol: Yes  Exclusion: Service Performed in Patient Hospital Room (and thus did not require transfer): N/A  Unplanned admission to ICU related to anesthesia service up through end of PACU care- (MD51)  Unplanned admission to ICU (not initially anticipated at anesthesia start time): No

## 2020-02-23 NOTE — PROGRESS NOTES
[facility] Medicine Daily Progress Note    Date of Service  2/23/2020    Chief Complaint  79 y.o. male admitted 2/19/2020 with GI bleeding.    Hospital Course  Admitted with GI bleeding, Anemia requiring transfusion.    Interval Problem Update  GIB - EGD with push enteroscopy done today, unable to do Colonoscopy, requires more prep  Anemia - hgb 7.7  Aflutter - currently SR    Lengthy discussion with patient and spouse, updates given, plan of care discussed.    Consultants/Specialty  Gastroenterology  Cardiology  Nephrology    Code Status  Full    Disposition  TBD    Review of Systems  Review of Systems   Constitutional: Positive for malaise/fatigue. Negative for chills, diaphoresis and fever.   HENT: Negative for hearing loss and sore throat.    Eyes: Negative for blurred vision.   Respiratory: Negative for cough, shortness of breath and wheezing.    Cardiovascular: Negative for chest pain, palpitations and leg swelling.   Gastrointestinal: Negative for abdominal pain, blood in stool, diarrhea, heartburn, melena, nausea and vomiting.   Genitourinary: Negative for dysuria, flank pain and hematuria.   Musculoskeletal: Negative for back pain and neck pain.   Skin: Negative for rash.   Neurological: Positive for weakness. Negative for dizziness, sensory change, speech change, focal weakness and headaches.   Psychiatric/Behavioral: The patient is not nervous/anxious.         Physical Exam  Temp:  [36.1 °C (97 °F)-36.7 °C (98.1 °F)] 36.4 °C (97.5 °F)  Pulse:  [] 88  Resp:  [16-18] 18  BP: (122-155)/(50-83) 148/67  SpO2:  [95 %-100 %] 100 %    Physical Exam  HENT:      Head: Normocephalic and atraumatic.      Nose: No congestion.      Mouth/Throat:      Mouth: Mucous membranes are moist.   Eyes:      Conjunctiva/sclera: Conjunctivae normal.      Pupils: Pupils are equal, round, and reactive to light.   Neck:      Musculoskeletal: No muscular tenderness.   Cardiovascular:      Rate and Rhythm: Normal rate and regular  rhythm.      Heart sounds: Murmur present.   Pulmonary:      Effort: Pulmonary effort is normal.      Breath sounds: Normal breath sounds.   Abdominal:      General: Bowel sounds are normal. There is no distension.      Palpations: Abdomen is soft.      Tenderness: There is no abdominal tenderness. There is no guarding or rebound.   Musculoskeletal:      Right lower leg: Edema present.      Left lower leg: Edema present.   Lymphadenopathy:      Cervical: No cervical adenopathy.   Skin:     General: Skin is warm and dry.   Neurological:      General: No focal deficit present.      Mental Status: He is alert and oriented to person, place, and time.      Cranial Nerves: No cranial nerve deficit.         Fluids    Intake/Output Summary (Last 24 hours) at 2/23/2020 1500  Last data filed at 2/23/2020 1200  Gross per 24 hour   Intake 500 ml   Output 0 ml   Net 500 ml       Laboratory  Recent Labs     02/21/20 0332 02/22/20 0400 02/22/20 2107 02/23/20 0419 02/23/20  0923   WBC 4.2*  --  4.0*  --  3.7*  --    RBC 2.65*  --  2.68*  --  2.61*  --    HEMOGLOBIN 8.3*   < > 8.2* 8.9* 8.1* 7.7*   HEMATOCRIT 25.9*  --  27.6*  --  26.4*  --    MCV 97.7  --  103.0*  --  101.1*  --    MCH 31.3  --  30.6  --  31.0  --    MCHC 32.0*  --  29.7*  --  30.7*  --    RDW 65.1*  --  67.9*  --  68.0*  --    PLATELETCT 201  --  201  --  202  --    MPV 9.4  --  9.4  --  9.5  --     < > = values in this interval not displayed.     Recent Labs     02/21/20 0332 02/22/20 0400 02/23/20 0419   SODIUM 142 143 147*   POTASSIUM 4.1 4.0 4.4   CHLORIDE 102 103 105   CO2 26 26 27   GLUCOSE 95 83 84   BUN 49* 41* 47*   CREATININE 5.77* 5.19* 6.52*   CALCIUM 9.2 8.9 9.5                   Imaging  No orders to display        Assessment/Plan  * GI bleed- (present on admission)  Assessment & Plan  IV Protonix  Capsule endoscopy -  2 bleeding foci seen - proximal small bowel and cecum. 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  2/19/2020  Colonoscopy - Scattered medium sized diverticuli noted. Examination suboptimal to exclude flat lesions like angiodysplasia. 2/19/2020  EGD w/ push enteroscopy done today  For Colonoscopy tomorrow    Acute on chronic blood loss anemia- (present on admission)  Assessment & Plan  Transfused 2 u PRBC  Serial hgb    Paroxysmal atrial flutter (HCC)- (present on admission)  Assessment & Plan  Holding Coumadin    Severe aortic stenosis- (present on admission)  Assessment & Plan  watch fluid status  Holding Torsemide    ESRD (end stage renal disease) on dialysis ([facility])- (present on admission)  Assessment & Plan  HD MWF    PAD (peripheral artery disease) ([facility])- (present on admission)  Assessment & Plan  ASA, Crestor    Coronary artery disease- (present on admission)  Assessment & Plan  History of stent  ASA, Crestor  Hold Plavix - discussed with Cardiology    COPD (chronic obstructive pulmonary disease) ([facility])- (present on admission)  Assessment & Plan  Anoro, Flovent, RT protocol    HELGA (obstructive sleep apnea)- (present on admission)  Assessment & Plan  RT protocol    Dyslipidemia- (present on admission)  Assessment & Plan  Crestor       VTE prophylaxis: SCD

## 2020-02-23 NOTE — ANESTHESIA PROCEDURE NOTES
Airway  Date/Time: 2/23/2020 10:35 AM  Performed by: Ismael De Jesus III, M.D.  Authorized by: Ismael De Jesus III, M.D.     Location:  OR  Urgency:  Elective  Difficult Airway: No    Indications for Airway Management:  Anesthesia  Spontaneous Ventilation: absent    Sedation Level:  Deep  Preoxygenated: Yes    Patient Position:  Sniffing  Final Airway Type:  Endotracheal airway  Final Endotracheal Airway:  ETT  Cuffed: Yes    Technique Used for Successful ETT Placement:  Direct laryngoscopy  Insertion Site:  Oral  Blade Type:  Ortega  Laryngoscope Blade/Videolaryngoscope Blade Size:  3  ETT Size (mm):  8.0  Measured from:  Lips  ETT to Lips (cm):  22  Placement Verified by: auscultation and capnometry    Cormack-Lehane Classification:  Grade III - view of epiglottis only  Number of Attempts at Approach:  1

## 2020-02-23 NOTE — ANESTHESIA PREPROCEDURE EVALUATION
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HCC)   (+) AVM (arteriovenous malformation)   (+) Coronary artery disease   (+) Essential hypertension   (+) Ischemic necrosis of foot (HCC)   (+) PAD (peripheral artery disease) (HCC)   (+) Paroxysmal atrial flutter (HCC)   (+) Severe aortic stenosis      GI   (+) Acute gastric ulcer         (+) ESRD (end stage renal disease) (HCC)   (+) ESRD (end stage renal disease) on dialysis (HCC)   (+) Hyperkalemia, diminished renal excretion   (+) Renal cyst   (+) Uremia      Other   (+) Uric acid arthropathy       Physical Exam    Airway   Mallampati: III  Neck ROM: limited       Cardiovascular    Dental    Pulmonary    Abdominal    Neurological      Other findings: HELGA, poor dentition, COPD, PVD, dialysis, ESRD, Acute GI bleed            Anesthesia Plan    ASA 3- EMERGENT       Plan - general       Airway plan will be ETT  (Acute GI BLEED  HELGA)      Induction: intravenous    Postoperative Plan: Postoperative administration of opioids is intended.    Pertinent diagnostic labs and testing reviewed    Informed Consent:    Anesthetic plan and risks discussed with patient.    Use of blood products discussed with: patient whom consented to blood products.

## 2020-02-23 NOTE — ANESTHESIA POSTPROCEDURE EVALUATION
Patient: Parmjit Castelan    Procedure Summary     Date:  02/23/20 Room / Location:  [facility] OR 06 / SURGERY [facility]    Anesthesia Start:  1025 Anesthesia Stop:  1105    Procedures:       GASTROSCOPY (N/A Mouth)      COLONOSCOPY (N/A Anus) Diagnosis:  (gastric avms)    Surgeon:  Enrique Child D.O. Responsible Provider:  Ismael De Jesus III, M.D.    Anesthesia Type:  general ASA Status:  3 - Emergent          Final Anesthesia Type: general  Last vitals  BP   Blood Pressure : 138/54    Temp   36.7 °C (98.1 °F)    Pulse   Pulse: 81   Resp   18    SpO2   100 %      Anesthesia Post Evaluation    Patient location during evaluation: PACU  Patient participation: complete - patient participated  Level of consciousness: awake and alert  Pain score: 0    Airway patency: patent  Anesthetic complications: no  Cardiovascular status: hemodynamically stable  Respiratory status: acceptable  Hydration status: euvolemic    PONV: none           Nurse Pain Score: 0 (NPRS)

## 2020-02-23 NOTE — ANESTHESIA TIME REPORT
Anesthesia Start and Stop Event Times     Date Time Event    2/23/2020 0950 Ready for Procedure     1025 Anesthesia Start     1105 Anesthesia Stop        Responsible Staff  02/23/20    Name Role Begin End    Ismael De Jesus III, M.D. Anesth 1025 1105        Preop Diagnosis (Free Text):  Pre-op Diagnosis     gi bleed        Preop Diagnosis (Codes):    Post op Diagnosis  Acute GI bleeding  ESRD, PVD, HELGA    Premium Reason  B. 1st Call    Comments: emergency

## 2020-02-24 ENCOUNTER — ANESTHESIA EVENT (OUTPATIENT)
Dept: SURGERY | Facility: MEDICAL CENTER | Age: 80
DRG: 377 | End: 2020-02-24
Payer: MEDICARE

## 2020-02-24 ENCOUNTER — ANESTHESIA (OUTPATIENT)
Dept: SURGERY | Facility: MEDICAL CENTER | Age: 80
DRG: 377 | End: 2020-02-24
Payer: MEDICARE

## 2020-02-24 LAB
ACTION RANGE TRIGGERED IACRT: YES
ANION GAP SERPL CALC-SCNC: 13 MMOL/L (ref 0–11.9)
ANION GAP SERPL CALC-SCNC: 13 MMOL/L (ref 0–11.9)
BASE EXCESS BLDV CALC-SCNC: 6 MMOL/L (ref -4–3)
BASOPHILS # BLD AUTO: 0.3 % (ref 0–1.8)
BASOPHILS # BLD: 0.01 K/UL (ref 0–0.12)
BODY TEMPERATURE: ABNORMAL DEGREES
BUN SERPL-MCNC: 27 MG/DL (ref 8–22)
BUN SERPL-MCNC: 49 MG/DL (ref 8–22)
CA-I BLD ISE-SCNC: 1.16 MMOL/L (ref 1.1–1.3)
CALCIUM SERPL-MCNC: 9 MG/DL (ref 8.5–10.5)
CALCIUM SERPL-MCNC: 9.3 MG/DL (ref 8.5–10.5)
CHLORIDE SERPL-SCNC: 103 MMOL/L (ref 96–112)
CHLORIDE SERPL-SCNC: 104 MMOL/L (ref 96–112)
CO2 BLDV-SCNC: 30 MMOL/L (ref 20–33)
CO2 SERPL-SCNC: 25 MMOL/L (ref 20–33)
CO2 SERPL-SCNC: 26 MMOL/L (ref 20–33)
CREAT BLD-MCNC: 4.7 MG/DL (ref 0.5–1.4)
CREAT SERPL-MCNC: 4.83 MG/DL (ref 0.5–1.4)
CREAT SERPL-MCNC: 7.51 MG/DL (ref 0.5–1.4)
EOSINOPHIL # BLD AUTO: 0.17 K/UL (ref 0–0.51)
EOSINOPHIL NFR BLD: 5.5 % (ref 0–6.9)
ERYTHROCYTE [DISTWIDTH] IN BLOOD BY AUTOMATED COUNT: 67.5 FL (ref 35.9–50)
GLUCOSE BLD-MCNC: 77 MG/DL (ref 65–99)
GLUCOSE SERPL-MCNC: 83 MG/DL (ref 65–99)
GLUCOSE SERPL-MCNC: 87 MG/DL (ref 65–99)
HCO3 BLDV-SCNC: 28.7 MMOL/L (ref 24–28)
HCT VFR BLD AUTO: 22.7 % (ref 42–52)
HCT VFR BLD CALC: 22 % (ref 42–52)
HGB BLD-MCNC: 7.1 G/DL (ref 14–18)
HGB BLD-MCNC: 7.2 G/DL (ref 14–18)
HGB BLD-MCNC: 7.5 G/DL (ref 14–18)
HGB BLD-MCNC: 7.6 G/DL (ref 14–18)
IMM GRANULOCYTES # BLD AUTO: 0.01 K/UL (ref 0–0.11)
IMM GRANULOCYTES NFR BLD AUTO: 0.3 % (ref 0–0.9)
INST. QUALIFIED PATIENT IIQPT: YES
LYMPHOCYTES # BLD AUTO: 0.49 K/UL (ref 1–4.8)
LYMPHOCYTES NFR BLD: 15.8 % (ref 22–41)
MCH RBC QN AUTO: 31.6 PG (ref 27–33)
MCHC RBC AUTO-ENTMCNC: 31.3 G/DL (ref 33.7–35.3)
MCV RBC AUTO: 100.9 FL (ref 81.4–97.8)
MONOCYTES # BLD AUTO: 0.34 K/UL (ref 0–0.85)
MONOCYTES NFR BLD AUTO: 10.9 % (ref 0–13.4)
NEUTROPHILS # BLD AUTO: 2.09 K/UL (ref 1.82–7.42)
NEUTROPHILS NFR BLD: 67.2 % (ref 44–72)
NRBC # BLD AUTO: 0 K/UL
NRBC BLD-RTO: 0 /100 WBC
PATHOLOGY CONSULT NOTE: NORMAL
PCO2 BLDV: 32.1 MMHG (ref 41–51)
PH BLDV: 7.56 [PH] (ref 7.31–7.45)
PLATELET # BLD AUTO: 177 K/UL (ref 164–446)
PMV BLD AUTO: 9.6 FL (ref 9–12.9)
PO2 BLDV: 30 MMHG (ref 25–40)
POTASSIUM BLD-SCNC: 3.7 MMOL/L (ref 3.6–5.5)
POTASSIUM SERPL-SCNC: 4 MMOL/L (ref 3.6–5.5)
POTASSIUM SERPL-SCNC: 4.4 MMOL/L (ref 3.6–5.5)
RBC # BLD AUTO: 2.25 M/UL (ref 4.7–6.1)
SAO2 % BLDV: 68 %
SODIUM BLD-SCNC: 140 MMOL/L (ref 135–145)
SODIUM SERPL-SCNC: 142 MMOL/L (ref 135–145)
SODIUM SERPL-SCNC: 142 MMOL/L (ref 135–145)
SPECIMEN DRAWN FROM PATIENT: ABNORMAL
WBC # BLD AUTO: 3.1 K/UL (ref 4.8–10.8)

## 2020-02-24 PROCEDURE — 93005 ELECTROCARDIOGRAM TRACING: CPT | Performed by: INTERNAL MEDICINE

## 2020-02-24 PROCEDURE — 160009 HCHG ANES TIME/MIN: Performed by: INTERNAL MEDICINE

## 2020-02-24 PROCEDURE — C9113 INJ PANTOPRAZOLE SODIUM, VIA: HCPCS | Performed by: INTERNAL MEDICINE

## 2020-02-24 PROCEDURE — 36415 COLL VENOUS BLD VENIPUNCTURE: CPT

## 2020-02-24 PROCEDURE — 700102 HCHG RX REV CODE 250 W/ 637 OVERRIDE(OP): Performed by: FAMILY MEDICINE

## 2020-02-24 PROCEDURE — 0W3P8ZZ CONTROL BLEEDING IN GASTROINTESTINAL TRACT, VIA NATURAL OR ARTIFICIAL OPENING ENDOSCOPIC: ICD-10-PCS | Performed by: INTERNAL MEDICINE

## 2020-02-24 PROCEDURE — 85018 HEMOGLOBIN: CPT | Mod: 91

## 2020-02-24 PROCEDURE — 160035 HCHG PACU - 1ST 60 MINS PHASE I: Performed by: INTERNAL MEDICINE

## 2020-02-24 PROCEDURE — 160203 HCHG ENDO MINUTES - 1ST 30 MINS LEVEL 4: Performed by: INTERNAL MEDICINE

## 2020-02-24 PROCEDURE — 700111 HCHG RX REV CODE 636 W/ 250 OVERRIDE (IP): Performed by: ANESTHESIOLOGY

## 2020-02-24 PROCEDURE — 84295 ASSAY OF SERUM SODIUM: CPT

## 2020-02-24 PROCEDURE — 0DBK8ZZ EXCISION OF ASCENDING COLON, VIA NATURAL OR ARTIFICIAL OPENING ENDOSCOPIC: ICD-10-PCS | Performed by: INTERNAL MEDICINE

## 2020-02-24 PROCEDURE — A9270 NON-COVERED ITEM OR SERVICE: HCPCS | Performed by: INTERNAL MEDICINE

## 2020-02-24 PROCEDURE — 88305 TISSUE EXAM BY PATHOLOGIST: CPT

## 2020-02-24 PROCEDURE — 90935 HEMODIALYSIS ONE EVALUATION: CPT | Performed by: INTERNAL MEDICINE

## 2020-02-24 PROCEDURE — 700102 HCHG RX REV CODE 250 W/ 637 OVERRIDE(OP): Performed by: INTERNAL MEDICINE

## 2020-02-24 PROCEDURE — 90935 HEMODIALYSIS ONE EVALUATION: CPT

## 2020-02-24 PROCEDURE — 99232 SBSQ HOSP IP/OBS MODERATE 35: CPT | Performed by: FAMILY MEDICINE

## 2020-02-24 PROCEDURE — 82330 ASSAY OF CALCIUM: CPT

## 2020-02-24 PROCEDURE — 85025 COMPLETE CBC W/AUTO DIFF WBC: CPT

## 2020-02-24 PROCEDURE — 770020 HCHG ROOM/CARE - TELE (206)

## 2020-02-24 PROCEDURE — 82565 ASSAY OF CREATININE: CPT

## 2020-02-24 PROCEDURE — 160002 HCHG RECOVERY MINUTES (STAT): Performed by: INTERNAL MEDICINE

## 2020-02-24 PROCEDURE — 80048 BASIC METABOLIC PNL TOTAL CA: CPT

## 2020-02-24 PROCEDURE — 160208 HCHG ENDO MINUTES - EA ADDL 1 MIN LEVEL 4: Performed by: INTERNAL MEDICINE

## 2020-02-24 PROCEDURE — 700105 HCHG RX REV CODE 258: Performed by: ANESTHESIOLOGY

## 2020-02-24 PROCEDURE — 501629 HCHG TUBE, LUKI TRAP STERILE DISP: Performed by: INTERNAL MEDICINE

## 2020-02-24 PROCEDURE — 700101 HCHG RX REV CODE 250: Performed by: ANESTHESIOLOGY

## 2020-02-24 PROCEDURE — 160048 HCHG OR STATISTICAL LEVEL 1-5: Performed by: INTERNAL MEDICINE

## 2020-02-24 PROCEDURE — 700101 HCHG RX REV CODE 250

## 2020-02-24 PROCEDURE — A9270 NON-COVERED ITEM OR SERVICE: HCPCS | Performed by: FAMILY MEDICINE

## 2020-02-24 PROCEDURE — 82947 ASSAY GLUCOSE BLOOD QUANT: CPT

## 2020-02-24 PROCEDURE — 85014 HEMATOCRIT: CPT

## 2020-02-24 PROCEDURE — 84132 ASSAY OF SERUM POTASSIUM: CPT

## 2020-02-24 PROCEDURE — 700111 HCHG RX REV CODE 636 W/ 250 OVERRIDE (IP): Performed by: INTERNAL MEDICINE

## 2020-02-24 PROCEDURE — 82803 BLOOD GASES ANY COMBINATION: CPT

## 2020-02-24 PROCEDURE — 5A1D70Z PERFORMANCE OF URINARY FILTRATION, INTERMITTENT, LESS THAN 6 HOURS PER DAY: ICD-10-PCS | Performed by: INTERNAL MEDICINE

## 2020-02-24 RX ORDER — DIPHENHYDRAMINE HYDROCHLORIDE 50 MG/ML
12.5 INJECTION INTRAMUSCULAR; INTRAVENOUS
Status: DISCONTINUED | OUTPATIENT
Start: 2020-02-24 | End: 2020-02-24 | Stop reason: HOSPADM

## 2020-02-24 RX ORDER — HYDRALAZINE HYDROCHLORIDE 20 MG/ML
5 INJECTION INTRAMUSCULAR; INTRAVENOUS
Status: DISCONTINUED | OUTPATIENT
Start: 2020-02-24 | End: 2020-02-24 | Stop reason: HOSPADM

## 2020-02-24 RX ORDER — HALOPERIDOL 5 MG/ML
1 INJECTION INTRAMUSCULAR
Status: DISCONTINUED | OUTPATIENT
Start: 2020-02-24 | End: 2020-02-24 | Stop reason: HOSPADM

## 2020-02-24 RX ORDER — MIDAZOLAM HYDROCHLORIDE 1 MG/ML
1 INJECTION INTRAMUSCULAR; INTRAVENOUS
Status: DISCONTINUED | OUTPATIENT
Start: 2020-02-24 | End: 2020-02-24 | Stop reason: HOSPADM

## 2020-02-24 RX ORDER — LIDOCAINE HYDROCHLORIDE 20 MG/ML
INJECTION, SOLUTION EPIDURAL; INFILTRATION; INTRACAUDAL; PERINEURAL PRN
Status: DISCONTINUED | OUTPATIENT
Start: 2020-02-24 | End: 2020-02-24 | Stop reason: SURG

## 2020-02-24 RX ORDER — METOPROLOL TARTRATE 1 MG/ML
1 INJECTION, SOLUTION INTRAVENOUS
Status: DISCONTINUED | OUTPATIENT
Start: 2020-02-24 | End: 2020-02-24 | Stop reason: HOSPADM

## 2020-02-24 RX ORDER — SUCCINYLCHOLINE CHLORIDE 20 MG/ML
INJECTION INTRAMUSCULAR; INTRAVENOUS PRN
Status: DISCONTINUED | OUTPATIENT
Start: 2020-02-24 | End: 2020-02-24 | Stop reason: SURG

## 2020-02-24 RX ORDER — PHENYLEPHRINE HCL IN 0.9% NACL 0.5 MG/5ML
SYRINGE (ML) INTRAVENOUS PRN
Status: DISCONTINUED | OUTPATIENT
Start: 2020-02-24 | End: 2020-02-24 | Stop reason: SURG

## 2020-02-24 RX ORDER — SODIUM CHLORIDE, SODIUM LACTATE, POTASSIUM CHLORIDE, CALCIUM CHLORIDE 600; 310; 30; 20 MG/100ML; MG/100ML; MG/100ML; MG/100ML
INJECTION, SOLUTION INTRAVENOUS CONTINUOUS
Status: DISCONTINUED | OUTPATIENT
Start: 2020-02-24 | End: 2020-02-24 | Stop reason: HOSPADM

## 2020-02-24 RX ORDER — ONDANSETRON 2 MG/ML
INJECTION INTRAMUSCULAR; INTRAVENOUS PRN
Status: DISCONTINUED | OUTPATIENT
Start: 2020-02-24 | End: 2020-02-24 | Stop reason: SURG

## 2020-02-24 RX ORDER — LIDOCAINE HYDROCHLORIDE 10 MG/ML
INJECTION, SOLUTION INFILTRATION; PERINEURAL
Status: COMPLETED
Start: 2020-02-24 | End: 2020-02-24

## 2020-02-24 RX ORDER — ONDANSETRON 2 MG/ML
4 INJECTION INTRAMUSCULAR; INTRAVENOUS
Status: DISCONTINUED | OUTPATIENT
Start: 2020-02-24 | End: 2020-02-24 | Stop reason: HOSPADM

## 2020-02-24 RX ORDER — MEPERIDINE HYDROCHLORIDE 25 MG/ML
12.5 INJECTION INTRAMUSCULAR; INTRAVENOUS; SUBCUTANEOUS
Status: DISCONTINUED | OUTPATIENT
Start: 2020-02-24 | End: 2020-02-24 | Stop reason: HOSPADM

## 2020-02-24 RX ORDER — SODIUM CHLORIDE 9 MG/ML
INJECTION, SOLUTION INTRAVENOUS
Status: DISCONTINUED | OUTPATIENT
Start: 2020-02-24 | End: 2020-02-24 | Stop reason: SURG

## 2020-02-24 RX ORDER — LABETALOL HYDROCHLORIDE 5 MG/ML
5 INJECTION, SOLUTION INTRAVENOUS
Status: DISCONTINUED | OUTPATIENT
Start: 2020-02-24 | End: 2020-02-24 | Stop reason: HOSPADM

## 2020-02-24 RX ADMIN — PANTOPRAZOLE SODIUM 40 MG: 40 INJECTION, POWDER, LYOPHILIZED, FOR SOLUTION INTRAVENOUS at 19:27

## 2020-02-24 RX ADMIN — FLUTICASONE PROPIONATE 88 MCG: 44 AEROSOL, METERED RESPIRATORY (INHALATION) at 05:40

## 2020-02-24 RX ADMIN — ASPIRIN 81 MG 81 MG: 81 TABLET ORAL at 05:41

## 2020-02-24 RX ADMIN — UMECLIDINIUM BROMIDE AND VILANTEROL TRIFENATATE 1 PUFF: 62.5; 25 POWDER RESPIRATORY (INHALATION) at 05:40

## 2020-02-24 RX ADMIN — Medication 100 MCG: at 15:12

## 2020-02-24 RX ADMIN — FLUTICASONE PROPIONATE 100 MCG: 50 SPRAY, METERED NASAL at 05:40

## 2020-02-24 RX ADMIN — SODIUM CHLORIDE: 900 INJECTION INTRAVENOUS at 14:42

## 2020-02-24 RX ADMIN — EPHEDRINE SULFATE 25 MG: 50 INJECTION, SOLUTION INTRAVENOUS at 14:42

## 2020-02-24 RX ADMIN — PANTOPRAZOLE SODIUM 40 MG: 40 INJECTION, POWDER, LYOPHILIZED, FOR SOLUTION INTRAVENOUS at 05:41

## 2020-02-24 RX ADMIN — SUCCINYLCHOLINE CHLORIDE 100 MG: 20 INJECTION, SOLUTION INTRAMUSCULAR; INTRAVENOUS at 14:42

## 2020-02-24 RX ADMIN — LIDOCAINE HYDROCHLORIDE 40 MG: 20 INJECTION, SOLUTION EPIDURAL; INFILTRATION; INTRACAUDAL at 14:42

## 2020-02-24 RX ADMIN — Medication 1 ML: at 09:03

## 2020-02-24 RX ADMIN — ROPINIROLE HYDROCHLORIDE 1 MG: 2 TABLET, FILM COATED ORAL at 23:08

## 2020-02-24 RX ADMIN — PROPOFOL 150 MG: 10 INJECTION, EMULSION INTRAVENOUS at 14:42

## 2020-02-24 RX ADMIN — TRAZODONE HYDROCHLORIDE 300 MG: 150 TABLET ORAL at 23:08

## 2020-02-24 RX ADMIN — ONDANSETRON 4 MG: 2 INJECTION INTRAMUSCULAR; INTRAVENOUS at 14:42

## 2020-02-24 RX ADMIN — TAMSULOSIN HYDROCHLORIDE 0.8 MG: 0.4 CAPSULE ORAL at 05:41

## 2020-02-24 RX ADMIN — LIDOCAINE HYDROCHLORIDE 1 ML: 10 INJECTION, SOLUTION INFILTRATION; PERINEURAL at 09:03

## 2020-02-24 RX ADMIN — MONTELUKAST 10 MG: 10 TABLET, FILM COATED ORAL at 19:27

## 2020-02-24 RX ADMIN — Medication 667 MG: at 19:27

## 2020-02-24 RX ADMIN — ROSUVASTATIN CALCIUM 40 MG: 20 TABLET, FILM COATED ORAL at 19:27

## 2020-02-24 ASSESSMENT — ENCOUNTER SYMPTOMS
HEADACHES: 0
COUGH: 0
SENSORY CHANGE: 0
BACK PAIN: 0
SPEECH CHANGE: 0
BLURRED VISION: 0
PALPITATIONS: 0
SORE THROAT: 0
BLOOD IN STOOL: 0
NAUSEA: 0
NECK PAIN: 0
FEVER: 0
NERVOUS/ANXIOUS: 0
DIAPHORESIS: 0
DIZZINESS: 0
HEARTBURN: 0
ABDOMINAL PAIN: 0
VOMITING: 0
FLANK PAIN: 0
WHEEZING: 0
FOCAL WEAKNESS: 0
CHILLS: 0
SHORTNESS OF BREATH: 0
DIARRHEA: 0
WEAKNESS: 1

## 2020-02-24 ASSESSMENT — PAIN SCALES - GENERAL: PAIN_LEVEL: 0

## 2020-02-24 NOTE — OP REPORT
DATE OF SERVICE:  2/24/2020     INDICATION FOR PROCEDURE:  EGD with push enteroscopy and control of bleeding and Colonoscopy with polypectomy and control of bleeding     PROCEDURE PERFORMED: symptomatic chronic blood loss anemia     CONSENT:  Informed consent was obtained directly from the patient after benefits, risks and possible alternatives were discussed.     MEDICATIONS:  The patient received general anesthesia for this procedure. Please see the anesthesia record for details.        EGD PROCEDURE DESCRIPTION:  The patient was placed in the left lateral decubitus position after sedation and intubation.  When ready, the upper endoscope was placed in the patient's mouth and advanced easily and carefully to the esophagus and subsequently to the stomach, duodenum and jejunum.The scope was slowly withdrawn and mucosa carefully examined. Retroflexion was performed in the stomach. The patients toleration of this procedure was excellent.     COLONOSCOPY PROCEDURE DESCRIPTION:  After completion of the EGD scope, the patient was turned and the colonoscopy was performed.  JAIDEN was performed and was normal. The colonoscope was then introduced into the rectum through the anus. The colonoscope was advanced through the colon under direct visualization to the cecum. The scope was then withdrawn and mucosa examined. Retroflexion was performed in the rectum. The patients toleration of this procedure was excellent. The prep was fair as well.         FINDINGS:    Esophagus: normal     Stomach: normal     Duodenum: normal    Jejunum: 2 small non-bleeding AVMs were seen in the proximal jejunum and were cauterized using APC     Colon: sigmoid diverticulosis was noted  - (2) 3-4mm sessile polyps were removed from the ascending colon using cold snare  - one cecal angioectasia was identified and cauterized using APC. There was no bleeding noted from this lesion           COMPLICATIONS:  No complications or blood loss during or in the  immediate postoperative period.     IMPRESSION:  1.  small non-bleeding AVMs - cauterizerd  2. Diverticulosis  3. Fair colon prep - small polyps could have been missed  4. Non- bleeding cecal angioectasia  - cauterized  5. Ascending colon polyps     RECOMMENDATION:    1. Monitor blood counts  2. Transfuse PRN  3. Likely this patient has angioectasias due to severe valvular disease  4. If patient remains stable, he can return to clinic as outpatient for continued blood count monitoring. He will likely need an iron infusion prior to discharge

## 2020-02-24 NOTE — CARE PLAN
Problem: Communication  Goal: The ability to communicate needs accurately and effectively will improve  Outcome: PROGRESSING AS EXPECTED   Patient educated to utilize call light. Patient and family oriented to hospital room. Patient encouraged to ask questions about plan of care. Patient effectively uses call light and is involved in POC.    Problem: Safety  Goal: Will remain free from injury  Outcome: PROGRESSING AS EXPECTED   Patient's risk for injury and falls assessed. Appropriate safety precautions in place. Patient educated to utilize call light for needs. Patient verbalizes understanding.    Problem: Knowledge Deficit  Goal: Knowledge of disease process/condition, treatment plan, diagnostic tests, and medications will improve  Outcome: PROGRESSING AS EXPECTED   Patient is educated of disease process and condition. Treatment team has included patient in plan of care. All medications indications and side effects are explained. Patient is encouraged to ask questions. Patient indicates understanding.

## 2020-02-24 NOTE — OR NURSING
1531 to pacu from or awake but sleepy mask at 6 liters. Report recvd from Dr carvalho and or rn   1607- report called to [facility] on tele transported back to room via [facility]

## 2020-02-24 NOTE — ANESTHESIA PREPROCEDURE EVALUATION
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[facility])   (+) 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[facility])   (+) AVM (arteriovenous malformation)   (+) Coronary artery disease   (+) Essential hypertension   (+) Ischemic necrosis of foot ([facility])   (+) PAD (peripheral artery disease) ([facility])   (+) Paroxysmal atrial flutter ([facility])   (+) Severe aortic stenosis      GI   (+) Acute gastric ulcer         (+) ESRD (end stage renal disease) (HCC)   (+) ESRD (end stage renal disease) on dialysis ([facility])   (+) Hyperkalemia, diminished renal excretion   (+) Renal cyst   (+) Uremia      Other   (+) Uric acid arthropathy       Physical Exam    Airway   Mallampati: II  TM distance: >3 FB  Neck ROM: full       Cardiovascular - normal exam  Rhythm: regular  Rate: normal  (-) murmur     Dental - normal exam         Pulmonary - normal exam  Breath sounds clear to auscultation     Abdominal    Neurological - normal exam                 Anesthesia Plan    ASA 3   ASA physical status 3 criteria: other (comment)    Plan - general       Airway plan will be ETT  (Severe aortic stenosis)      Induction: intravenous    Postoperative Plan: Postoperative administration of opioids is intended.    Pertinent diagnostic labs and testing reviewed    Informed Consent:    Anesthetic plan and risks discussed with patient.    Use of blood products discussed with: patient whom consented to blood products.

## 2020-02-24 NOTE — PROGRESS NOTES
Pt with ESRD, presented with Anemia.  Pt is doing better, plan for EGD today.  Seen and examined while getting HD.

## 2020-02-24 NOTE — ANESTHESIA TIME REPORT
Anesthesia Start and Stop Event Times     Date Time Event    2/24/2020 1239 Ready for Procedure     1442 Anesthesia Start     1533 Anesthesia Stop        Responsible Staff  02/24/20    Name Role Begin End    Bg Mac M.D. Anesth 1442 1533        Preop Diagnosis (Free Text):  Pre-op Diagnosis     gi bleed, anemia        Preop Diagnosis (Codes):    Post op Diagnosis  Colon polyps      Premium Reason  A. 3PM - 7AM    Comments:

## 2020-02-24 NOTE — ANESTHESIA PROCEDURE NOTES
Airway  Date/Time: 2/24/2020 2:43 PM  Performed by: Bg Mac M.D.  Authorized by: Bg Mac M.D.     Location:  OR  Urgency:  Elective  Indications for Airway Management:  Anesthesia  Spontaneous Ventilation: absent    Sedation Level:  Deep  Preoxygenated: Yes    Patient Position:  Sniffing  Final Airway Type:  Endotracheal airway  Final Endotracheal Airway:  ETT  Cuffed: Yes    Technique Used for Successful ETT Placement:  Direct laryngoscopy  Insertion Site:  Oral  Blade Type:  Cruz  Laryngoscope Blade/Videolaryngoscope Blade Size:  3  ETT Size (mm):  7.0  Measured from:  Teeth  ETT to Teeth (cm):  20  Placement Verified by: auscultation and capnometry    Cormack-Lehane Classification:  Grade I - full view of glottis  Number of Attempts at Approach:  1

## 2020-02-24 NOTE — ANESTHESIA POSTPROCEDURE EVALUATION
Patient: Parmjit Castelan    Procedure Summary     Date:  02/24/20 Room / Location:  [facility] ROOM 27 / SURGERY SAME DAY [facility]    Anesthesia Start:  1442 Anesthesia Stop:      Procedures:       COLONOSCOPY, WITH ARGON PLASMA COAGULATION (N/A Mouth)      GASTROSCOPY, WITH PUSH ENTEROSCOPY (N/A Esophagus) Diagnosis:  (Polyps & Angioectasia Sm/Lg Bowel)    Surgeon:  Juan Matthews M.D. Responsible Provider:  Bg Mac M.D.    Anesthesia Type:  MAC ASA Status:  3          Final Anesthesia Type: MAC  Last vitals  BP   Blood Pressure : (!) 98/53    Temp   36.3 °C (97.4 °F)    Pulse   Pulse: (!) 105   Resp   18    SpO2   100 %      Anesthesia Post Evaluation    Patient location during evaluation: PACU  Patient participation: complete - patient participated  Level of consciousness: awake and alert  Pain score: 0    Airway patency: patent  Anesthetic complications: no  Cardiovascular status: hemodynamically stable  Respiratory status: acceptable  Hydration status: euvolemic    PONV: none           Nurse Pain Score: 0 (NPRS)

## 2020-02-24 NOTE — PROGRESS NOTES
Hemodialysis treatment ordered today per Dr Najjar x 3 hours. Treatment initiated at 0907, ended at 1207.     Patient tolerated tx; see paper flow sheet for details.     Net UF 4,000 mL.     Needles removed from access site. Dressings applied and sites held x 15 minutes; verified no bleeding. Positive bruit/thrill post tx. Staff RN to monitor AVF for breakthrough bleeding. Should breakthrough bleeding occur, staff RN to apply pressure to access sites until bleeding resolved. Notify Dialysis and Nephrologist for follow-up.    Report given to Primary RN.

## 2020-02-24 NOTE — OR NURSING
Pt from tele, placed on monitor showing SR no ectopy.  Plan of care discussed. Consents signed.  IV's bilateral AC locked.  NS available to go with pt to OR.  No fluids infusong as pt is dialysis pt.

## 2020-02-25 LAB
ABO GROUP BLD: NORMAL
ANION GAP SERPL CALC-SCNC: 11 MMOL/L (ref 0–11.9)
ANISOCYTOSIS BLD QL SMEAR: ABNORMAL
BARCODED ABORH UBTYP: 9500
BARCODED PRD CODE UBPRD: NORMAL
BARCODED UNIT NUM UBUNT: NORMAL
BASOPHILS # BLD AUTO: 0.7 % (ref 0–1.8)
BASOPHILS # BLD: 0.02 K/UL (ref 0–0.12)
BLD GP AB SCN SERPL QL: NORMAL
BUN SERPL-MCNC: 31 MG/DL (ref 8–22)
CALCIUM SERPL-MCNC: 9 MG/DL (ref 8.5–10.5)
CHLORIDE SERPL-SCNC: 104 MMOL/L (ref 96–112)
CO2 SERPL-SCNC: 26 MMOL/L (ref 20–33)
COMMENT 1642: NORMAL
COMPONENT R 8504R: NORMAL
CREAT SERPL-MCNC: 5.38 MG/DL (ref 0.5–1.4)
EKG IMPRESSION: NORMAL
EOSINOPHIL # BLD AUTO: 0.1 K/UL (ref 0–0.51)
EOSINOPHIL NFR BLD: 3.4 % (ref 0–6.9)
ERYTHROCYTE [DISTWIDTH] IN BLOOD BY AUTOMATED COUNT: 67.2 FL (ref 35.9–50)
GLUCOSE SERPL-MCNC: 86 MG/DL (ref 65–99)
HCT VFR BLD AUTO: 23.2 % (ref 42–52)
HGB BLD-MCNC: 6.9 G/DL (ref 14–18)
HGB BLD-MCNC: 7.2 G/DL (ref 14–18)
IMM GRANULOCYTES # BLD AUTO: 0.01 K/UL (ref 0–0.11)
IMM GRANULOCYTES NFR BLD AUTO: 0.3 % (ref 0–0.9)
IRON SATN MFR SERPL: 19 % (ref 15–55)
IRON SERPL-MCNC: 53 UG/DL (ref 50–180)
LYMPHOCYTES # BLD AUTO: 0.78 K/UL (ref 1–4.8)
LYMPHOCYTES NFR BLD: 26.4 % (ref 22–41)
MACROCYTES BLD QL SMEAR: ABNORMAL
MCH RBC QN AUTO: 31.4 PG (ref 27–33)
MCHC RBC AUTO-ENTMCNC: 31 G/DL (ref 33.7–35.3)
MCV RBC AUTO: 101.3 FL (ref 81.4–97.8)
MICROCYTES BLD QL SMEAR: ABNORMAL
MONOCYTES # BLD AUTO: 0.08 K/UL (ref 0–0.85)
MONOCYTES NFR BLD AUTO: 2.7 % (ref 0–13.4)
MORPHOLOGY BLD-IMP: NORMAL
NEUTROPHILS # BLD AUTO: 1.96 K/UL (ref 1.82–7.42)
NEUTROPHILS NFR BLD: 66.5 % (ref 44–72)
NRBC # BLD AUTO: 0 K/UL
NRBC BLD-RTO: 0 /100 WBC
OVALOCYTES BLD QL SMEAR: NORMAL
PLATELET # BLD AUTO: 170 K/UL (ref 164–446)
PLATELET BLD QL SMEAR: NORMAL
PMV BLD AUTO: 9.1 FL (ref 9–12.9)
POIKILOCYTOSIS BLD QL SMEAR: NORMAL
POTASSIUM SERPL-SCNC: 4.1 MMOL/L (ref 3.6–5.5)
PRODUCT TYPE UPROD: NORMAL
RBC # BLD AUTO: 2.29 M/UL (ref 4.7–6.1)
RBC BLD AUTO: PRESENT
RH BLD: NORMAL
SODIUM SERPL-SCNC: 141 MMOL/L (ref 135–145)
TIBC SERPL-MCNC: 286 UG/DL (ref 250–450)
UNIT STATUS USTAT: NORMAL
WBC # BLD AUTO: 3 K/UL (ref 4.8–10.8)

## 2020-02-25 PROCEDURE — 85018 HEMOGLOBIN: CPT

## 2020-02-25 PROCEDURE — 99232 SBSQ HOSP IP/OBS MODERATE 35: CPT | Performed by: INTERNAL MEDICINE

## 2020-02-25 PROCEDURE — 93010 ELECTROCARDIOGRAM REPORT: CPT | Performed by: INTERNAL MEDICINE

## 2020-02-25 PROCEDURE — 700102 HCHG RX REV CODE 250 W/ 637 OVERRIDE(OP): Performed by: FAMILY MEDICINE

## 2020-02-25 PROCEDURE — 700102 HCHG RX REV CODE 250 W/ 637 OVERRIDE(OP): Performed by: INTERNAL MEDICINE

## 2020-02-25 PROCEDURE — 85025 COMPLETE CBC W/AUTO DIFF WBC: CPT

## 2020-02-25 PROCEDURE — 36415 COLL VENOUS BLD VENIPUNCTURE: CPT

## 2020-02-25 PROCEDURE — 700111 HCHG RX REV CODE 636 W/ 250 OVERRIDE (IP): Performed by: INTERNAL MEDICINE

## 2020-02-25 PROCEDURE — 36430 TRANSFUSION BLD/BLD COMPNT: CPT

## 2020-02-25 PROCEDURE — A9270 NON-COVERED ITEM OR SERVICE: HCPCS | Performed by: INTERNAL MEDICINE

## 2020-02-25 PROCEDURE — 86850 RBC ANTIBODY SCREEN: CPT

## 2020-02-25 PROCEDURE — 86900 BLOOD TYPING SEROLOGIC ABO: CPT

## 2020-02-25 PROCEDURE — 86923 COMPATIBILITY TEST ELECTRIC: CPT

## 2020-02-25 PROCEDURE — 83550 IRON BINDING TEST: CPT

## 2020-02-25 PROCEDURE — A9270 NON-COVERED ITEM OR SERVICE: HCPCS | Performed by: FAMILY MEDICINE

## 2020-02-25 PROCEDURE — 770020 HCHG ROOM/CARE - TELE (206)

## 2020-02-25 PROCEDURE — C9113 INJ PANTOPRAZOLE SODIUM, VIA: HCPCS | Performed by: INTERNAL MEDICINE

## 2020-02-25 PROCEDURE — 86901 BLOOD TYPING SEROLOGIC RH(D): CPT

## 2020-02-25 PROCEDURE — 83540 ASSAY OF IRON: CPT

## 2020-02-25 PROCEDURE — 80048 BASIC METABOLIC PNL TOTAL CA: CPT

## 2020-02-25 PROCEDURE — P9016 RBC LEUKOCYTES REDUCED: HCPCS

## 2020-02-25 PROCEDURE — 99233 SBSQ HOSP IP/OBS HIGH 50: CPT | Performed by: INTERNAL MEDICINE

## 2020-02-25 RX ADMIN — UMECLIDINIUM BROMIDE AND VILANTEROL TRIFENATATE 1 PUFF: 62.5; 25 POWDER RESPIRATORY (INHALATION) at 05:19

## 2020-02-25 RX ADMIN — FLUTICASONE PROPIONATE 88 MCG: 44 AEROSOL, METERED RESPIRATORY (INHALATION) at 05:20

## 2020-02-25 RX ADMIN — Medication 667 MG: at 18:08

## 2020-02-25 RX ADMIN — FLUTICASONE PROPIONATE 100 MCG: 50 SPRAY, METERED NASAL at 05:20

## 2020-02-25 RX ADMIN — ASPIRIN 81 MG 81 MG: 81 TABLET ORAL at 05:20

## 2020-02-25 RX ADMIN — ROSUVASTATIN CALCIUM 40 MG: 20 TABLET, FILM COATED ORAL at 18:08

## 2020-02-25 RX ADMIN — BENZONATATE 100 MG: 100 CAPSULE ORAL at 05:20

## 2020-02-25 RX ADMIN — TRAZODONE HYDROCHLORIDE 150 MG: 150 TABLET ORAL at 23:59

## 2020-02-25 RX ADMIN — PANTOPRAZOLE SODIUM 40 MG: 40 INJECTION, POWDER, LYOPHILIZED, FOR SOLUTION INTRAVENOUS at 18:08

## 2020-02-25 RX ADMIN — ROPINIROLE HYDROCHLORIDE 1 MG: 2 TABLET, FILM COATED ORAL at 22:54

## 2020-02-25 RX ADMIN — TAMSULOSIN HYDROCHLORIDE 0.8 MG: 0.4 CAPSULE ORAL at 05:20

## 2020-02-25 RX ADMIN — Medication 667 MG: at 14:46

## 2020-02-25 RX ADMIN — BENZONATATE 100 MG: 100 CAPSULE ORAL at 22:57

## 2020-02-25 RX ADMIN — Medication 667 MG: at 07:47

## 2020-02-25 RX ADMIN — PANTOPRAZOLE SODIUM 40 MG: 40 INJECTION, POWDER, LYOPHILIZED, FOR SOLUTION INTRAVENOUS at 05:20

## 2020-02-25 RX ADMIN — MONTELUKAST 10 MG: 10 TABLET, FILM COATED ORAL at 18:08

## 2020-02-25 ASSESSMENT — ENCOUNTER SYMPTOMS
CONSTIPATION: 0
DIAPHORESIS: 0
VOMITING: 0
SHORTNESS OF BREATH: 0
NAUSEA: 0
SORE THROAT: 0
BACK PAIN: 0
ABDOMINAL PAIN: 0
WEAKNESS: 1
FEVER: 0
CHILLS: 0
SPEECH CHANGE: 0
PALPITATIONS: 0
DIARRHEA: 0
BLURRED VISION: 0
SENSORY CHANGE: 0
BLOOD IN STOOL: 0
COUGH: 0
DIZZINESS: 0
HEADACHES: 0
FOCAL WEAKNESS: 0
WHEEZING: 0
FLANK PAIN: 0
NECK PAIN: 0
NERVOUS/ANXIOUS: 0
HEARTBURN: 0

## 2020-02-25 NOTE — PROGRESS NOTES
Pt transported back to floor with PACU RN. Pt placed back on tele monitor. Received report from PACU RN, Pt assessed, A&Ox4, postprocedure vitals initiated, vitals stable, pt denies pain. No orders held were found to be released.

## 2020-02-25 NOTE — CARE PLAN
Problem: Communication  Goal: The ability to communicate needs accurately and effectively will improve  Outcome: PROGRESSING AS EXPECTED   Patient educated to utilize call light. Patient and family oriented to hospital room. Patient encouraged to ask questions about plan of care. Patient effectively uses call light and is involved in POC.    Problem: Safety  Goal: Will remain free from injury  Outcome: PROGRESSING AS EXPECTED   Patient's risk for injury and falls assessed. Appropriate safety precautions in place. Patient educated to utilize call light for needs. Patient verbalizes understanding.

## 2020-02-25 NOTE — PROGRESS NOTES
IV Iron Per Pharmacy Note    S/O:     2/25/2020 03:35   Hemoglobin 7.2 (L)   Hematocrit 23.2 (L)      2/25/2020 09:16   Iron 53   Total Iron Binding 286   % Saturation 19     - 1 unit PRBC infused today      A/P:    Pt's iron studies normal, which is in line with labs on previous admit (2/17/20). He's also getting blood, which can confound iron labs. Per discussion with Blake PRESSLEY, will not give iron at this time    Ale Silva, DanaeD, BCPS

## 2020-02-25 NOTE — PROGRESS NOTES
Nephrology Daily Progress Note    Date of Service  2/25/2020    Chief Complaint  79 y.o. male with ESRD on HD Monday Wednesday Friday at [facility] who presented 2/17/2020 with low hemoglobin.    Interval Problem Update  2/21 -patient transferred to [facility] for capsule endoscopy.  Patient awaiting results of capsule endoscopy.  Patient denies chest pain, shortness of breath.  Denies bright red blood per rectum, but complains of continued dark stools.  2/25 patient feels better today, anxious to go home, no recent bowel movement the last 24-hour  Review of Systems  Review of Systems   Constitutional: Negative for chills, fever and malaise/fatigue.   Respiratory: Negative for cough and shortness of breath.    Cardiovascular: Negative for chest pain and leg swelling.   Gastrointestinal: Negative for nausea and vomiting.   Genitourinary: Negative for dysuria, frequency and urgency.        Physical Exam  Temp:  [36.1 °C (97 °F)-36.7 °C (98 °F)] 36.1 °C (97 °F)  Pulse:  [] 99  Resp:  [16-20] 18  BP: ()/(48-67) 120/66  SpO2:  [92 %-100 %] 95 %    Physical Exam  Vitals signs and nursing note reviewed.   Constitutional:       General: He is not in acute distress.     Appearance: He is not ill-appearing.   HENT:      Head: Normocephalic and atraumatic.      Right Ear: External ear normal.      Left Ear: External ear normal.      Nose: Nose normal.   Eyes:      General:         Right eye: No discharge.         Left eye: No discharge.      Conjunctiva/sclera: Conjunctivae normal.   Cardiovascular:      Rate and Rhythm: Normal rate and regular rhythm.      Heart sounds: No murmur.   Pulmonary:      Effort: Pulmonary effort is normal. No respiratory distress.      Breath sounds: Normal breath sounds. No wheezing.   Musculoskeletal:         General: No swelling, tenderness or deformity.   Skin:     General: Skin is warm.   Neurological:      General: No focal deficit present.      Mental Status: He is  alert and oriented to person, place, and time.   Psychiatric:         Mood and Affect: Mood normal.         Behavior: Behavior normal.     Access: Right brachiocephalic AV fistula.  There is soft tissue swelling in the distal part of the upper arm near the elbow, deep to the access, but the access has patent bruit and thrill.    Fluids    Intake/Output Summary (Last 24 hours) at 2/25/2020 1212  Last data filed at 2/24/2020 1800  Gross per 24 hour   Intake 1240 ml   Output 4500 ml   Net -3260 ml       Laboratory  Labs reviewed, pertinent labs below.  Recent Labs     02/23/20  0419  02/24/20  0458  02/24/20  2043 02/25/20  0335 02/25/20  0916   WBC 3.7*  --  3.1*  --   --  3.0*  --    RBC 2.61*  --  2.25*  --   --  2.29*  --    HEMOGLOBIN 8.1*   < > 7.1*   < > 7.6* 7.2* 6.9*   HEMATOCRIT 26.4*  --  22.7*  --   --  23.2*  --    .1*  --  100.9*  --   --  101.3*  --    MCH 31.0  --  31.6  --   --  31.4  --    MCHC 30.7*  --  31.3*  --   --  31.0*  --    RDW 68.0*  --  67.5*  --   --  67.2*  --    PLATELETCT 202  --  177  --   --  170  --    MPV 9.5  --  9.6  --   --  9.1  --     < > = values in this interval not displayed.     Recent Labs     02/24/20  0458 02/24/20  1625 02/25/20  0335   SODIUM 142 142 141   POTASSIUM 4.4 4.0 4.1   CHLORIDE 104 103 104   CO2 25 26 26   GLUCOSE 83 87 86   BUN 49* 27* 31*   CREATININE 7.51* 4.83* 5.38*   CALCIUM 9.3 9.0 9.0               URINALYSIS:  Lab Results   Component Value Date/Time    COLORURINE Yellow 03/19/2018 2009    CLARITY Sl Cloudy (A) 03/19/2018 2009    SPECGRAVITY 1.010 03/19/2018 2009    PHURINE 8.5 (A) 03/19/2018 2009    KETONES Negative 03/19/2018 2009    PROTEINURIN >=300 (A) 03/19/2018 2009    BILIRUBINUR Negative 03/19/2018 2009    NITRITE Negative 03/19/2018 2009    LEUKESTERAS Negative 03/19/2018 2009    OCCULTBLOOD Small (A) 03/19/2018 2009     [facility] [place]  Lab Results   Component Value Date/Time    TOTPROTUR 774.0 (H) 01/09/2012 1644      Lab Results   Component  Value Date/Time    CREATININEU 141.57 01/09/2012 1644         Imaging reviewed  No orders to display         Current Facility-Administered Medications   Medication Dose Route Frequency Provider Last Rate Last Dose   • MD Alert...Total Body Iron Replacement per Pharmacy   Other PHARMACY TO DOSE MAUREEN Chaves.       • enalaprilat (VASOTEC) injection 1.25 mg  1.25 mg Intravenous Q4HRS PRN Kash Blackwell M.D.   1.25 mg at 02/23/20 2155   • benzonatate (TESSALON) capsule 100 mg  100 mg Oral TID PRN Zaheer Monreal M.D.   100 mg at 02/25/20 0520   • ROPINIRole (REQUIP) tablet 1 mg  1 mg Oral QHS Shad Cole M.D.   1 mg at 02/24/20 2308   • ondansetron (ZOFRAN ODT) dispertab 4 mg  4 mg Oral Q4HRS PRN Urvashi Joy M.D.       • ondansetron (ZOFRAN) syringe/vial injection 4 mg  4 mg Intravenous Q4HRS PRN Urvashi Joy M.D.       • senna-docusate (PERICOLACE or SENOKOT S) 8.6-50 MG per tablet 2 Tab  2 Tab Oral BID Urvashi oJy M.D.   Stopped at 02/24/20 0600    And   • polyethylene glycol/lytes (MIRALAX) PACKET 1 Packet  1 Packet Oral QDAY PRN Urvashi Joy M.D.        And   • bisacodyl (DULCOLAX) suppository 10 mg  10 mg Rectal QDAY PRN Urvashi Joy M.D.       • albumin human 25% solution 12.5 g  12.5 g Intravenous DIALYSIS PRN Urvashi Joy M.D.       • NS (BOLUS) infusion 250 mL  250 mL Intravenous DIALYSIS PRN Urvashi Joy M.D.       • lidocaine (XYLOCAINE) 1 % injection 1 mL  1 mL Intradermal PRN Urvashi Joy M.D.   1 mL at 02/24/20 0903   • albuterol inhaler 1-2 Puff  1-2 Puff Inhalation Q4HRS PRN Urvashi Joy M.D.       • aspirin (ASA) chewable tab 81 mg  81 mg Oral DAILY Urvashi Joy M.D.   81 mg at 02/25/20 0520   • calcium acetate (PHOS-LO) 667 MG tablet 667 mg  667 mg Oral TID AC Urvashi Joy M.D.   667 mg at 02/25/20 0747   • fluticasone (FLONASE) nasal spray  mcg  1-2 Spray Nasal DAILY Urvashi Joy M.D.   100 mcg at 02/25/20 0520   • fluticasone (FLOVENT HFA) 44 MCG/ACT  inhaler 88 mcg  2 Puff Inhalation DAILY Urvashi Joy M.D.   88 mcg at 02/25/20 0520   • levalbuterol (XOPENEX) 1.25 MG/3ML nebulizer solution 1.25 mg  1.25 mg Nebulization Q4HRS PRN Urvashi Joy M.D.       • montelukast (SINGULAIR) tablet 10 mg  10 mg Oral Q EVENING Urvashi Joy M.D.   10 mg at 02/24/20 1927   • pantoprazole (PROTONIX) injection 40 mg  40 mg Intravenous BID Urvashi Joy M.D.   40 mg at 02/25/20 0520   • rosuvastatin (CRESTOR) tablet 40 mg  40 mg Oral Q EVENING Urvashi Joy M.D.   40 mg at 02/24/20 1927   • tamsulosin (FLOMAX) capsule 0.8 mg  0.8 mg Oral DAILY Urvashi Joy M.D.   0.8 mg at 02/25/20 0520   • traZODone (DESYREL) tablet 150-300 mg  150-300 mg Oral QHS Urvashi Joy M.D.   300 mg at 02/24/20 2308   • umeclidinium-vilanterol (ANORO ELLIPTA) inhaler 1 Puff  1 Puff Inhalation DAILY Urvashi Joy M.D.   1 Puff at 02/25/20 0519         Assessment/Plan  79 y.o. male with ESRD on HD Monday Wednesday Friday at [facility] who presented 2/17/2020 with low hemoglobin.     1. ESRD .  2. Access: R BC AVF  3. Acute blood loss anemia: Hemoglobin seems to be stable, await GI input..   4. Anemia of CKD: EPO with dialysis   no need for HD today  Renal diet  Daily labs  Renal dose all meds  Avoid nephrotoxins  Prognosis guarded.

## 2020-02-25 NOTE — PROGRESS NOTES
Gastroenterology Progress Note     Author: ILAN Norman   Date & Time Created: 2/25/2020 10:10 AM    Chief Complaint:  Anemia    Interval History:  Patient reports she feels well, eager to go home.  Last BM 2/23.  No abdominal pain, nausea, vomiting.  Tolerating diet.  Hgb stable overnight dipped to 6.9.           Review of Systems:  Review of Systems   Constitutional: Negative for malaise/fatigue.   Cardiovascular: Negative for chest pain.   Gastrointestinal: Negative for abdominal pain, blood in stool, constipation, diarrhea, heartburn, melena, nausea and vomiting.       Physical Exam:  Physical Exam  HENT:      Head: Normocephalic.   Eyes:      Pupils: Pupils are equal, round, and reactive to light.   Cardiovascular:      Rate and Rhythm: Normal rate.      Heart sounds: Murmur present.   Abdominal:      General: Bowel sounds are normal.      Comments: Rounded, semifirm   Skin:     General: Skin is warm and dry.      Coloration: Skin is pale.   Neurological:      General: No focal deficit present.      Mental Status: He is alert and oriented to person, place, and time.   Psychiatric:         Mood and Affect: Mood normal.         Behavior: Behavior normal.         Thought Content: Thought content normal.         Judgment: Judgment normal.         Labs:  Recent Labs     02/24/20  1357   ISTATTEMP see below   ISTATSPEC Venous         Recent Labs     02/24/20  0458 02/24/20  1625 02/25/20  0335   SODIUM 142 142 141   POTASSIUM 4.4 4.0 4.1   CHLORIDE 104 103 104   CO2 25 26 26   BUN 49* 27* 31*   CREATININE 7.51* 4.83* 5.38*   CALCIUM 9.3 9.0 9.0     Recent Labs     02/24/20  0458 02/24/20  1625 02/25/20  0335   GLUCOSE 83 87 86     Recent Labs     02/23/20  0419  02/24/20  0458  02/24/20  2043 02/25/20  0335 02/25/20  0916   RBC 2.61*  --  2.25*  --   --  2.29*  --    HEMOGLOBIN 8.1*   < > 7.1*   < > 7.6* 7.2* 6.9*   HEMATOCRIT 26.4*  --  22.7*  --   --  23.2*  --    PLATELETCT 202  --  177   --   --  170  --    IRON  --   --   --   --   --   --  53   TOTIRONBC  --   --   --   --   --   --  286    < > = values in this interval not displayed.     Recent Labs     20  0419 20  0458 20  0335   WBC 3.7* 3.1* 3.0*   NEUTSPOLYS 64.50 67.20 66.50   LYMPHOCYTES 16.00* 15.80* 26.40   MONOCYTES 12.30 10.90 2.70   EOSINOPHILS 6.40 5.50 3.40   BASOPHILS 0.50 0.30 0.70     Hemodynamics:  Temp (24hrs), Av.5 °C (97.7 °F), Min:36.2 °C (97.2 °F), Max:36.7 °C (98 °F)  Temperature: 36.7 °C (98 °F)  Pulse  Av.1  Min: 66  Max: 107   Blood Pressure : 116/59     Respiratory:    Respiration: 18, Pulse Oximetry: 99 %        RUL Breath Sounds: Clear, RML Breath Sounds: Clear, RLL Breath Sounds: Diminished, SILVANA Breath Sounds: Clear, LLL Breath Sounds: Diminished  Fluids:    Intake/Output Summary (Last 24 hours) at 2020 1015  Last data filed at 2020 0215  Gross per 24 hour   Intake 300 ml   Output --   Net 300 ml     Weight: 74 kg (163 lb 2.3 oz)  GI/Nutrition:  Orders Placed This Encounter   Procedures   • Diet Order Clear Liquid     Standing Status:   Standing     Number of Occurrences:   1     Order Specific Question:   Diet:     Answer:   Clear Liquid [10]     Medical Decision Making, by Problem:  Active Hospital Problems    Diagnosis   • Acute on chronic blood loss anemia [D62]   • GI bleed [K92.2]   • Severe aortic stenosis [I35.0]   • ESRD (end stage renal disease) on dialysis ([facility]) [N18.6, Z99.2]   • Coronary artery disease [I25.10]   • Dyslipidemia [E78.5]   • COPD (chronic obstructive pulmonary disease) ([facility]) [J44.9]   • HELGA (obstructive sleep apnea) [G47.33]       Plan:  Endoscopy yesterday revealed small non-bleeding AVMs which were cauterized as well as diverticulosis.  Non- bleeding cecal angioectasia was also cauterized.  Additionally he had several ascending colon polyps, polypectomy performed.      1. Continue to monitor hgb, transfuse PRN to keep greater than 7.   2. Iron panel  pending, recommend replacement as indicated prior to discharge.   3. If he remains stable, he can return to clinic as outpatient for continued blood count monitoring.  Recommend weekly CBC to start.      Quality-Core Measures

## 2020-02-25 NOTE — PROGRESS NOTES
Hospital Medicine Daily Progress Note    Date of Service  2/25/2020    Chief Complaint  79 y.o. male admitted 2/19/2020 with GI bleeding.    Hospital Course  Admitted with GI bleeding, Anemia requiring transfusion.    Interval Problem Update  No acute events overnight  T-max 98.2  P 90, RR 16, 95% on RA, 108/56  Hemoglobin 6.9 this morning  Required 1 unit of RBCs  No dialysis today  Remains with 2+ pitting edema lower extremities  Eager to discharge home  Iron studies normal  Gets EPO with HD    Consultants/Specialty  Gastroenterology  Cardiology  Nephrology    Code Status  Full    Disposition  TBD    Review of Systems  Review of Systems   Constitutional: Positive for malaise/fatigue. Negative for chills, diaphoresis and fever.   HENT: Negative for hearing loss and sore throat.    Eyes: Negative for blurred vision.   Respiratory: Negative for cough, shortness of breath and wheezing.    Cardiovascular: Negative for chest pain, palpitations and leg swelling.   Gastrointestinal: Negative for abdominal pain, blood in stool, diarrhea, heartburn, melena, nausea and vomiting.   Genitourinary: Negative for dysuria, flank pain and hematuria.   Musculoskeletal: Negative for back pain and neck pain.   Skin: Negative for rash.   Neurological: Positive for weakness. Negative for dizziness, sensory change, speech change, focal weakness and headaches.   Psychiatric/Behavioral: The patient is not nervous/anxious.         Physical Exam  Temp:  [36.1 °C (97 °F)-36.8 °C (98.2 °F)] 36.8 °C (98.2 °F)  Pulse:  [] 90  Resp:  [16-20] 16  BP: ()/(48-67) 108/56  SpO2:  [92 %-100 %] 95 %    Physical Exam  Vitals signs and nursing note reviewed.   HENT:      Head: Normocephalic and atraumatic.      Nose: Nose normal.   Eyes:      Conjunctiva/sclera: Conjunctivae normal.      Pupils: Pupils are equal, round, and reactive to light.   Neck:      Musculoskeletal: Neck supple.   Cardiovascular:      Rate and Rhythm: Normal rate and  regular rhythm.      Heart sounds: Murmur present. Systolic murmur present with a grade of 3/6. No friction rub.   Pulmonary:      Effort: No respiratory distress.   Abdominal:      General: Bowel sounds are normal. There is no distension.      Palpations: Abdomen is soft.   Musculoskeletal:      Right lower le+ Edema present.      Left lower le+ Edema present.   Skin:     General: Skin is warm and dry.   Neurological:      Mental Status: He is alert.         Fluids    Intake/Output Summary (Last 24 hours) at 2020 1349  Last data filed at 2020 1800  Gross per 24 hour   Intake 740 ml   Output no documentation   Net 740 ml       Laboratory  Recent Labs     20  0419  20  0458  20  2043 20  0335 20  0916   WBC 3.7*  --  3.1*  --   --  3.0*  --    RBC 2.61*  --  2.25*  --   --  2.29*  --    HEMOGLOBIN 8.1*   < > 7.1*   < > 7.6* 7.2* 6.9*   HEMATOCRIT 26.4*  --  22.7*  --   --  23.2*  --    .1*  --  100.9*  --   --  101.3*  --    MCH 31.0  --  31.6  --   --  31.4  --    MCHC 30.7*  --  31.3*  --   --  31.0*  --    RDW 68.0*  --  67.5*  --   --  67.2*  --    PLATELETCT 202  --  177  --   --  170  --    MPV 9.5  --  9.6  --   --  9.1  --     < > = values in this interval not displayed.     Recent Labs     20  0458 20  1625 20  0335   SODIUM 142 142 141   POTASSIUM 4.4 4.0 4.1   CHLORIDE 104 103 104   CO2 25 26 26   GLUCOSE 83 87 86   BUN 49* 27* 31*   CREATININE 7.51* 4.83* 5.38*   CALCIUM 9.3 9.0 9.0                   Imaging  No orders to display        Assessment/Plan  * GI bleed- (present on admission)  Assessment & Plan  IV Protonix  Capsule endoscopy -  2 bleeding foci seen - proximal small bowel and cecum. 2020  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 2020  Colonoscopy - Scattered medium sized diverticuli noted. Examination suboptimal to exclude flat lesions like angiodysplasia. 2020  EGD w/ push enteroscopy and  Colonoscopy - 2 small non-bleeding AVMs were seen in the proximal jejunum and were cauterized using APC. Sigmoid diverticulosis was noted (2) 3-4mm sessile polyps were removed from the ascending colon using cold snare, one cecal angioectasia was identified and cauterized using APC. There was no bleeding noted from this lesion. 2/24/2020    Acute on chronic blood loss anemia- (present on admission)  Assessment & Plan  Required transfusion of 1 unit RBCs again 2/25/2020  hgb in the a.m.    Paroxysmal atrial flutter (Formerly [facility])- (present on admission)  Assessment & Plan  Holding Coumadin  Will need GI clearance to resume    Severe aortic stenosis- (present on admission)  Assessment & Plan  watch fluid status  Holding Torsemide    ESRD (end stage renal disease) on dialysis (Former[facility])- (present on admission)  Assessment & Plan  HD MWF    PAD (peripheral artery disease) ([facility])- (present on admission)  Assessment & Plan  ASA, Crestor    Coronary artery disease- (present on admission)  Assessment & Plan  History of stent  ASA, Crestor  Hold Plavix for now    Dyslipidemia- (present on admission)  Assessment & Plan  Crestor    COPD (chronic obstructive pulmonary disease) ([facility])- (present on admission)  Assessment & Plan  Anoro, Flovent, RT protocol    HELGA (obstructive sleep apnea)- (present on admission)  Assessment & Plan  RT protocol       VTE prophylaxis: SCD

## 2020-02-25 NOTE — OP REPORT
DATE OF SERVICE:  02/23/2020    PROCEDURES:  1.  Esophagogastroduodenoscopy.  2.  Push enteroscopy.  3.  Colonoscopy.    PREOPERATIVE DIAGNOSIS:  Gastrointestinal bleed.    POSTOPERATIVE DIAGNOSES:  1.  Gastrointestinal bleed.  2.  Poor prep.    ANESTHESIA:  General anesthesia.    DESCRIPTION OF PROCEDURE:  After informed consent and appropriate sedation,   the patient was placed in the left lateral position and the pediatric   colonoscope was advanced through the oropharynx into the esophagus through to   the second portion of the duodenum.  The scope was then advanced another   approximately  cm into the small bowel, likely into the jejunum.  The   mucosa was then examined carefully.  The scope was gently withdrawn.  There   were no mucosal abnormalities seen in the jejunum or duodenum other than mild   nodularity in the very proximal duodenal bulb.  The scope was withdrawn back   into the stomach.  Gastric antrum and body were unremarkable.  On   retroflexion, the gastric cardia and fundus were also unremarkable.  The   stomach was decompressed.  No blood was seen.  The scope was withdrawn.  GE   junction and esophagus were also unremarkable.    The patient was then turned and the pediatric colonoscope was advanced into   the rectum, which confirmed a poor prep.  Procedure was then terminated and   the scope was withdrawn.    RECOMMENDATIONS:  1.  Repeat GoLYTELY prep this evening, one gallon.  2.  The patient can be on clear liquids all day today and plan for colonoscopy   tomorrow to evaluate further for blood seen on video capsule endoscopy and to   rule out proximal colon bleed near the cecum.  3.  We will make further recommendations based on results of colonoscopy   tomorrow.  4.  Call with any questions or concerns.       ___________________________________DO ANTELMO Pérez    DD:  02/23/2020 11:19:40  DT:  02/23/2020 11:29:02    D#:  [id number]  Job#:  [id number]

## 2020-02-25 NOTE — PROGRESS NOTES
[facility] Medicine Daily Progress Note    Date of Service  2/24/2020    Chief Complaint  79 y.o. male admitted 2/19/2020 with GI bleeding.    Hospital Course  Admitted with GI bleeding, Anemia requiring transfusion.    Interval Problem Update  GIB - EGD with push enteroscopy and colonoscopy done today  Anemia - hgb 7.2  Aflutter - currently SR    Lengthy discussion with patient and spouse, updates given, plan of care discussed.    Consultants/Specialty  Gastroenterology  Cardiology  Nephrology    Code Status  Full    Disposition  TBD    Review of Systems  Review of Systems   Constitutional: Positive for malaise/fatigue. Negative for chills, diaphoresis and fever.   HENT: Negative for hearing loss and sore throat.    Eyes: Negative for blurred vision.   Respiratory: Negative for cough, shortness of breath and wheezing.    Cardiovascular: Negative for chest pain, palpitations and leg swelling.   Gastrointestinal: Negative for abdominal pain, blood in stool, diarrhea, heartburn, melena, nausea and vomiting.   Genitourinary: Negative for dysuria, flank pain and hematuria.   Musculoskeletal: Negative for back pain and neck pain.   Skin: Negative for rash.   Neurological: Positive for weakness. Negative for dizziness, sensory change, speech change, focal weakness and headaches.   Psychiatric/Behavioral: The patient is not nervous/anxious.         Physical Exam  Temp:  [36.2 °C (97.2 °F)-36.9 °C (98.5 °F)] 36.3 °C (97.4 °F)  Pulse:  [] 88  Resp:  [14-20] 20  BP: ()/(48-87) 117/58  SpO2:  [91 %-100 %] 93 %    Physical Exam  HENT:      Head: Normocephalic and atraumatic.      Nose: No congestion.      Mouth/Throat:      Mouth: Mucous membranes are moist.   Eyes:      Conjunctiva/sclera: Conjunctivae normal.      Pupils: Pupils are equal, round, and reactive to light.   Neck:      Musculoskeletal: No muscular tenderness.   Cardiovascular:      Rate and Rhythm: Normal rate and regular rhythm.      Heart sounds:  Murmur present.   Pulmonary:      Effort: Pulmonary effort is normal.      Breath sounds: Normal breath sounds.   Abdominal:      General: Bowel sounds are normal. There is no distension.      Palpations: Abdomen is soft.      Tenderness: There is no abdominal tenderness. There is no guarding or rebound.   Musculoskeletal:      Right lower leg: Edema present.      Left lower leg: Edema present.   Lymphadenopathy:      Cervical: No cervical adenopathy.   Skin:     General: Skin is warm and dry.   Neurological:      General: No focal deficit present.      Mental Status: He is alert and oriented to person, place, and time.      Cranial Nerves: No cranial nerve deficit.         Fluids    Intake/Output Summary (Last 24 hours) at 2/24/2020 1640  Last data filed at 2/24/2020 1532  Gross per 24 hour   Intake 1000 ml   Output 4500 ml   Net -3500 ml       Laboratory  Recent Labs     02/22/20 0400 02/23/20 0419 02/23/20 2041 02/24/20 0458 02/24/20  0942   WBC 4.0*  --  3.7*  --   --  3.1*  --    RBC 2.68*  --  2.61*  --   --  2.25*  --    HEMOGLOBIN 8.2*   < > 8.1*   < > 8.1* 7.1* 7.2*   HEMATOCRIT 27.6*  --  26.4*  --   --  22.7*  --    .0*  --  101.1*  --   --  100.9*  --    MCH 30.6  --  31.0  --   --  31.6  --    MCHC 29.7*  --  30.7*  --   --  31.3*  --    RDW 67.9*  --  68.0*  --   --  67.5*  --    PLATELETCT 201  --  202  --   --  177  --    MPV 9.4  --  9.5  --   --  9.6  --     < > = values in this interval not displayed.     Recent Labs     02/22/20 0400 02/23/20 0419 02/24/20 0458   SODIUM 143 147* 142   POTASSIUM 4.0 4.4 4.4   CHLORIDE 103 105 104   CO2 26 27 25   GLUCOSE 83 84 83   BUN 41* 47* 49*   CREATININE 5.19* 6.52* 7.51*   CALCIUM 8.9 9.5 9.3                   Imaging  No orders to display        Assessment/Plan  * GI bleed- (present on admission)  Assessment & Plan  IV Protonix  Capsule endoscopy -  2 bleeding foci seen - proximal small bowel and cecum. 2/20/2020  Small bowel endoscopy -  Gastric AVM status post ablation. 2/19/2020  Colonoscopy - Scattered medium sized diverticuli noted. Examination suboptimal to exclude flat lesions like angiodysplasia. 2/19/2020  EGD w/ push enteroscopy and Colonoscopy - 2 small non-bleeding AVMs were seen in the proximal jejunum and were cauterized using APC. Sigmoid diverticulosis was noted (2) 3-4mm sessile polyps were removed from the ascending colon using cold snare, one cecal angioectasia was identified and cauterized using APC. There was no bleeding noted from this lesion. 2/24/2020    Acute on chronic blood loss anemia- (present on admission)  Assessment & Plan  Transfused 2 u PRBC  Serial hgb    Paroxysmal atrial flutter (HCC)- (present on admission)  Assessment & Plan  Holding Coumadin    Severe aortic stenosis- (present on admission)  Assessment & Plan  watch fluid status  Holding Torsemide    ESRD (end stage renal disease) on dialysis ([facility])- (present on admission)  Assessment & Plan  HD MWF    PAD (peripheral artery disease) ([facility])- (present on admission)  Assessment & Plan  ASA, Crestor    Coronary artery disease- (present on admission)  Assessment & Plan  History of stent  ASA, Crestor  Hold Plavix - discussed with Cardiology    COPD (chronic obstructive pulmonary disease) ([facility])- (present on admission)  Assessment & Plan  Anoro, Flovent, RT protocol    HELGA (obstructive sleep apnea)- (present on admission)  Assessment & Plan  RT protocol    Dyslipidemia- (present on admission)  Assessment & Plan  Crestor       VTE prophylaxis: SCD

## 2020-02-25 NOTE — CARE PLAN
Problem: Knowledge Deficit  Goal: Knowledge of disease process/condition, treatment plan, diagnostic tests, and medications will improve  Outcome: PROGRESSING AS EXPECTED   Pt updated on POC, including medications, treatment and discharge planning. Questions answered as needed, pt verbalized understanding. Encouraged to voice further questions/concerns.   Problem: Psychosocial Needs:  Goal: Level of anxiety will decrease  Outcome: PROGRESSING AS EXPECTED   Problem: Knowledge Deficit  Goal: Knowledge of the prescribed therapeutic regimen will improve  Outcome: PROGRESSING AS EXPECTED     Pt encouraged to verbalized her feelings. Patient is helped to identify factors for stress and anxiety. Patient is taught to deep breathe and other forms of stress/anxiety management. Patient verbalizes understanding. Will continue to monitor.

## 2020-02-25 NOTE — DISCHARGE PLANNING
"Patient is a 79-year old  man who lives with his spouse, Yaquelin in [place], [place]. Patient is a retired  and . Has Medicare and AARP.    PCP is Dr. Light. Also Kidney physician. Has walker and cane at home if he is \"weak.\" Has home O2 with A-1 oxygen. Is on 2.5-3.0L \"as needed.\" Pharmacy is [facility] on [address].     Upon D/C, patient's family will transport home. No needs identified at this time.     Care Transition Team Assessment    Information Source  Orientation : Oriented x 4  Information Given By: Patient  Who is responsible for making decisions for patient? : Patient    Readmission Evaluation  Is this a readmission?: Yes - unplanned readmission  Why do you think you were readmitted?: GI bleed    Elopement Risk  Legal Hold: No  Ambulatory or Self Mobile in Wheelchair: Yes  Disoriented: No  Psychiatric Symptoms: None  History of Wandering: No  Elopement this Admit: No  Vocalizing Wanting to Leave: No  Displays Behaviors, Body Language Wanting to Leave: No-Not at Risk for Elopement  Elopement Risk: Not at Risk for Elopement    Interdisciplinary Discharge Planning  Patient or legal guardian wants to designate a caregiver (see row info): No    Discharge Preparedness  What is your plan after discharge?: Home with help  What are your discharge supports?: Child, Spouse  Prior Functional Level: Independent with Activities of Daily Living, Independent with Medication Management, Uses Cane, Uses Walker  Difficulity with ADLs: Walking  Difficulty with ADLs Comment: Walker and Cane when \"weak\"  Difficulity with IADLs: None    Functional Assesment  Prior Functional Level: Independent with Activities of Daily Living, Independent with Medication Management, Uses Cane, Uses Walker    Finances  Financial Barriers to Discharge: No  Prescription Coverage: Yes    Vision / Hearing Impairment  Vision Impairment : No  Hearing Impairment : No         Advance Directive  Advance Directive?: DPOA " for Health Care  Durable Power of  Name and Contact : Yaquelin Castelan    Domestic Abuse  Have you ever been the victim of abuse or violence?: No  Physical Abuse or Sexual Abuse: No  Verbal Abuse or Emotional Abuse: No  Possible Abuse Reported to:: Not Applicable    Psychological Assessment  History of Substance Abuse: None  History of Psychiatric Problems: No  Non-compliant with Treatment: No  Newly Diagnosed Illness: No    Discharge Risks or Barriers  Discharge risks or barriers?: No  Patient risk factors: Readmission    Anticipated Discharge Information  Anticipated discharge disposition: Home  Discharge Address: [address]  Discharge Contact Phone Number: [phone]

## 2020-02-25 NOTE — PROGRESS NOTES
Bedside report received from ROCHELLE Renteria. Assumed care of pt. Pt awake, laying in bed. A/Ox4, VSS. No concerns, complaints or distress. Pt educated to call before getting out of bed. POC reviewed and white board updated. Tele box on.  SR 95 on the monitor. Call light in reach. Bed locked in lowest position with 2 upper bed rails up. Bed alarm on.

## 2020-02-25 NOTE — PROGRESS NOTES
Bedside report received from ROCHELLE Renteria. Assumed care of pt. Pt awake, laying in bed. A/Ox4, VSS. No concerns, complaints or distress. Pt educated to call before getting out of bed. POC reviewed and white board updated. Tele box on.SR 97on the monitor. Call light in reach. Bed locked in lowest position with 2 upper bed rails up. Bed alarm on.

## 2020-02-26 VITALS
SYSTOLIC BLOOD PRESSURE: 121 MMHG | TEMPERATURE: 98.3 F | WEIGHT: 169.75 LBS | HEART RATE: 92 BPM | HEIGHT: 69 IN | BODY MASS INDEX: 25.14 KG/M2 | OXYGEN SATURATION: 93 % | RESPIRATION RATE: 18 BRPM | DIASTOLIC BLOOD PRESSURE: 65 MMHG

## 2020-02-26 PROBLEM — D62 ACUTE ON CHRONIC BLOOD LOSS ANEMIA: Status: RESOLVED | Noted: 2020-02-06 | Resolved: 2020-02-26

## 2020-02-26 PROBLEM — I48.92 PAROXYSMAL ATRIAL FLUTTER (HCC): Status: RESOLVED | Noted: 2019-06-12 | Resolved: 2020-02-26

## 2020-02-26 LAB
ANION GAP SERPL CALC-SCNC: 10 MMOL/L (ref 0–11.9)
BASOPHILS # BLD AUTO: 0.3 % (ref 0–1.8)
BASOPHILS # BLD: 0.01 K/UL (ref 0–0.12)
BUN SERPL-MCNC: 48 MG/DL (ref 8–22)
CALCIUM SERPL-MCNC: 9.1 MG/DL (ref 8.5–10.5)
CHLORIDE SERPL-SCNC: 102 MMOL/L (ref 96–112)
CO2 SERPL-SCNC: 27 MMOL/L (ref 20–33)
CREAT SERPL-MCNC: 7.07 MG/DL (ref 0.5–1.4)
EOSINOPHIL # BLD AUTO: 0.09 K/UL (ref 0–0.51)
EOSINOPHIL NFR BLD: 2.8 % (ref 0–6.9)
ERYTHROCYTE [DISTWIDTH] IN BLOOD BY AUTOMATED COUNT: 68.8 FL (ref 35.9–50)
GLUCOSE SERPL-MCNC: 93 MG/DL (ref 65–99)
HCT VFR BLD AUTO: 24.7 % (ref 42–52)
HGB BLD-MCNC: 7.8 G/DL (ref 14–18)
IMM GRANULOCYTES # BLD AUTO: 0.01 K/UL (ref 0–0.11)
IMM GRANULOCYTES NFR BLD AUTO: 0.3 % (ref 0–0.9)
LDH SERPL L TO P-CCNC: 187 U/L (ref 107–266)
LYMPHOCYTES # BLD AUTO: 0.38 K/UL (ref 1–4.8)
LYMPHOCYTES NFR BLD: 11.7 % (ref 22–41)
MCH RBC QN AUTO: 31.3 PG (ref 27–33)
MCHC RBC AUTO-ENTMCNC: 31.6 G/DL (ref 33.7–35.3)
MCV RBC AUTO: 99.2 FL (ref 81.4–97.8)
MONOCYTES # BLD AUTO: 0.32 K/UL (ref 0–0.85)
MONOCYTES NFR BLD AUTO: 9.9 % (ref 0–13.4)
NEUTROPHILS # BLD AUTO: 2.43 K/UL (ref 1.82–7.42)
NEUTROPHILS NFR BLD: 75 % (ref 44–72)
NRBC # BLD AUTO: 0 K/UL
NRBC BLD-RTO: 0 /100 WBC
PLATELET # BLD AUTO: 157 K/UL (ref 164–446)
PMV BLD AUTO: 9.4 FL (ref 9–12.9)
POTASSIUM SERPL-SCNC: 4.4 MMOL/L (ref 3.6–5.5)
RBC # BLD AUTO: 2.49 M/UL (ref 4.7–6.1)
SODIUM SERPL-SCNC: 139 MMOL/L (ref 135–145)
WBC # BLD AUTO: 3.2 K/UL (ref 4.8–10.8)

## 2020-02-26 PROCEDURE — 99239 HOSP IP/OBS DSCHRG MGMT >30: CPT | Performed by: INTERNAL MEDICINE

## 2020-02-26 PROCEDURE — 700102 HCHG RX REV CODE 250 W/ 637 OVERRIDE(OP): Performed by: INTERNAL MEDICINE

## 2020-02-26 PROCEDURE — A9270 NON-COVERED ITEM OR SERVICE: HCPCS | Performed by: INTERNAL MEDICINE

## 2020-02-26 PROCEDURE — 36415 COLL VENOUS BLD VENIPUNCTURE: CPT

## 2020-02-26 PROCEDURE — 80048 BASIC METABOLIC PNL TOTAL CA: CPT

## 2020-02-26 PROCEDURE — 83615 LACTATE (LD) (LDH) ENZYME: CPT

## 2020-02-26 PROCEDURE — 700111 HCHG RX REV CODE 636 W/ 250 OVERRIDE (IP): Performed by: INTERNAL MEDICINE

## 2020-02-26 PROCEDURE — 83010 ASSAY OF HAPTOGLOBIN QUANT: CPT

## 2020-02-26 PROCEDURE — C9113 INJ PANTOPRAZOLE SODIUM, VIA: HCPCS | Performed by: INTERNAL MEDICINE

## 2020-02-26 PROCEDURE — 85025 COMPLETE CBC W/AUTO DIFF WBC: CPT

## 2020-02-26 PROCEDURE — 99232 SBSQ HOSP IP/OBS MODERATE 35: CPT | Performed by: INTERNAL MEDICINE

## 2020-02-26 RX ORDER — ROSUVASTATIN CALCIUM 40 MG/1
40 TABLET, COATED ORAL EVERY EVENING
Qty: 30 TAB | Refills: 11 | Status: SHIPPED | OUTPATIENT
Start: 2020-02-26 | End: 2020-10-29 | Stop reason: SDUPTHER

## 2020-02-26 RX ADMIN — Medication 667 MG: at 07:36

## 2020-02-26 RX ADMIN — PANTOPRAZOLE SODIUM 40 MG: 40 INJECTION, POWDER, LYOPHILIZED, FOR SOLUTION INTRAVENOUS at 05:49

## 2020-02-26 RX ADMIN — FLUTICASONE PROPIONATE 88 MCG: 44 AEROSOL, METERED RESPIRATORY (INHALATION) at 05:48

## 2020-02-26 RX ADMIN — SENNOSIDES AND DOCUSATE SODIUM 2 TABLET: 8.6; 5 TABLET ORAL at 05:48

## 2020-02-26 RX ADMIN — TAMSULOSIN HYDROCHLORIDE 0.8 MG: 0.4 CAPSULE ORAL at 05:48

## 2020-02-26 RX ADMIN — UMECLIDINIUM BROMIDE AND VILANTEROL TRIFENATATE 1 PUFF: 62.5; 25 POWDER RESPIRATORY (INHALATION) at 05:48

## 2020-02-26 RX ADMIN — ASPIRIN 81 MG 81 MG: 81 TABLET ORAL at 05:49

## 2020-02-26 RX ADMIN — FLUTICASONE PROPIONATE 1 SPRAY: 50 SPRAY, METERED NASAL at 05:48

## 2020-02-26 ASSESSMENT — ENCOUNTER SYMPTOMS
CHILLS: 0
SHORTNESS OF BREATH: 0
NAUSEA: 0
FEVER: 0
VOMITING: 0
COUGH: 0

## 2020-02-26 NOTE — PROGRESS NOTES
Bedside report received from ROCHELLE Aguilera. Assumed care of pt. Pt awake, laying in bed. A/Ox4, VSS. No concerns, complaints or distress. Pt educated to call before getting out of bed. POC reviewed and white board updated. Tele box on. SR 90 on the monitor. Call light in reach. Bed locked in lowest position with 2 upper bed rails up. Bed alarm on.

## 2020-02-26 NOTE — DISCHARGE INSTRUCTIONS
Discharge Instructions per MAUREEN Chaves.    1.  Review your discharge Medication Reconciliation form as you may be provided with new prescriptions or changes to previously prescribed medications.  Be sure to take all of your prescribed medications exactly as directed.  Further questions can be directed to your local pharmacist or primary care provider (PCP).    2. Follow-up with your PCP.  An appointment may have already been scheduled for you.  Please review your discharge paperwork and locate this information.  Please be sure to call your PCP to cancel if you are unable to make this appointment or require schedule changes.  It is critical to to follow-up with your PCP to review hospital records and ensure any further diagnostic work-up, prescription refills, or referrals.     3. ACTIVITIES: After discharge from the hospital, you may resume routine activities including walking and going u/down stairs. However, no strenuous activity. For further clarification, call your PCP     4. DRIVING: You may drive whenever you are off pain medications and are able to perform the activities needed to drive, i.e. turning, bending, twisting, etc.     5. BOWEL FUNCTION: A few patients, after hospitalizations, will develop bowel irregularity. You could also experience constipation due to pain medication and decreased activity level. If you feel this is occurring, please take an over-the-counter laxative (Milk of Magnesia, Ex-Lax, Senokot, etc.) until the problem has resolved.     Return to ER if you develop recurrent chest pain, shortness of breath, bloody sputum, fever of 101.5 or greater, intractable nausea or vomiting, blood in the vomit or urine, intractable pain, new onset inability to ambulate, focal motor weakness, acute vision disturbance, acute speech disturbance, intractable abdominal pain, or any other worrisome symptoms.        '                Discharge Instructions    Discharged to home by car with relative.  Discharged via wheelchair, hospital escort: Yes.  Special equipment needed: Oxygen    Be sure to schedule a follow-up appointment with your primary care doctor or any specialists as instructed.     Discharge Plan:   Influenza Vaccine Indication: Not indicated: Previously immunized this influenza season and > 8 years of age    I understand that a diet low in cholesterol, fat, and sodium is recommended for good health. Unless I have been given specific instructions below for another diet, I accept this instruction as my diet prescription.   Other diet:   Special Instructions: None    · Is patient discharged on Warfarin / Coumadin?   No         Gastrointestinal Bleeding  Gastrointestinal (GI) bleeding is bleeding somewhere along the digestive tract, between the mouth and anus. This can be caused by various problems. The severity of these problems can range from mild to serious or even life-threatening. If you have GI bleeding, you may find blood in your stools (feces), you may have black stools, or you may vomit blood. If there is a lot of bleeding, you may need to stay in the hospital.  What are the causes?  This condition may be caused by:  · Esophagitis. This is inflammation, irritation, or swelling of the esophagus.  · Hemorrhoids. These are swollen veins in the rectum.  · Anal fissures. These are areas of painful tearing that are often caused by passing hard stool.  · Diverticulosis. These are pouches that form on the colon over time, with age, and may bleed a lot.  · Diverticulitis. This is inflammation in areas with diverticulosis. It can cause pain, fever, and bloody stools, although bleeding may be mild.  · Polyps and cancer. Colon cancer often starts out as precancerous polyps.  · Gastritis and ulcers. With these, bleeding may come from the upper GI tract, near the stomach.  What are the signs or symptoms?  Symptoms of this condition may include:  · Bright red blood in your vomit, or vomit that looks like  coffee grounds.  · Bloody, black, or tarry stools.  ¨ Bleeding from the lower GI tract will usually cause red or maroon blood in the stools.  ¨ Bleeding from the upper GI tract may cause black, tarry, often bad-smelling stools.  ¨ In certain cases, if the bleeding is fast enough, the stools may be red.  · Pain or cramping in the abdomen.  How is this diagnosed?  This condition may be diagnosed based on:  · Medical history and physical exam.  · Various tests, such as:  ¨ Blood tests.  ¨ X-rays and other imaging tests.  ¨ Esophagogastroduodenoscopy (EGD). In this test, a flexible, lighted tube is used to look at your esophagus, stomach, and small intestine.  ¨ Colonoscopy. In this test, a flexible, lighted tube is used to look at your colon.  How is this treated?  Treatment for this condition depends on the cause of the bleeding. For example:  · For bleeding from the esophagus, stomach, small intestine, or colon, the health care provider doing your EGD or colonoscopy may be able to stop the bleeding as part of the procedure.  · Inflammation or infection of the colon can be treated with medicines.  · Certain rectal problems can be treated with creams, suppositories, or warm baths.  · Surgery is sometimes needed.  · Blood transfusions are sometimes needed if a lot of blood has been lost.  If bleeding is slow, you may be allowed to go home. If there is a lot of bleeding, you will need to stay in the hospital for observation.  Follow these instructions at home:  · Take over-the-counter and prescription medicines only as told by your health care provider.  · Eat foods that are high in fiber. This will help to keep your stools soft. These foods include whole grains, legumes, fruits, and vegetables. Eating 1-3 prunes each day works well for many people.  · Drink enough fluid to keep your urine clear or pale yellow.  · Keep all follow-up visits as told by your health care provider. This is important.  Contact a health care  provider if:  · Your symptoms do not improve.  Get help right away if:  · Your bleeding increases.  · You feel light-headed or you faint.  · You feel weak.  · You have severe cramps in your back or abdomen.  · You pass large blood clots in your stool.  · Your symptoms are getting worse.  This information is not intended to replace advice given to you by your health care provider. Make sure you discuss any questions you have with your health care provider.  Document Released: 12/15/2001 Document Revised: 05/17/2017 Document Reviewed: 06/06/2016  [employer] Interactive Patient Education © 2017 [employer]            Depression / Suicide Risk    As you are discharged from this [facility] facility, it is important to learn how to keep safe from harming yourself.    Recognize the warning signs:  · Abrupt changes in personality, positive or negative- including increase in energy   · Giving away possessions  · Change in eating patterns- significant weight changes-  positive or negative  · Change in sleeping patterns- unable to sleep or sleeping all the time   · Unwillingness or inability to communicate  · Depression  · Unusual sadness, discouragement and loneliness  · Talk of wanting to die  · Neglect of personal appearance   · Rebelliousness- reckless behavior  · Withdrawal from people/activities they love  · Confusion- inability to concentrate     If you or a loved one observes any of these behaviors or has concerns about self-harm, here's what you can do:  · Talk about it- your feelings and reasons for harming yourself  · Remove any means that you might use to hurt yourself (examples: pills, rope, extension cords, firearm)  · Get professional help from the community (Mental Health, Substance Abuse, psychological counseling)  · Do not be alone:Call your Safe Contact- someone whom you trust who will be there for you.  · Call your local CRISIS HOTLINE 732-2595 or 862-536-2771  · Call your local Children's Mobile Crisis  Response Team [facility] (665) 369-7781 or www.Bswift.Gravitant  · Call the toll free National Suicide Prevention Hotlines   · National Suicide Prevention Lifeline 528-316-RLXY (5572)  · National Hope Line Network 800-SUICIDE (460-4148)

## 2020-02-26 NOTE — DISCHARGE PLANNING
Outpatient Dialysis Note    Patient can discharge to outpatient HD chair this afternoon, 2/26/2020.    [facility]  [address] [place] [address]         Schedule: Wednesday  Time: 3:15 pm    Patient needs to be there by 3:00 pm. LVM for Falguni-JAMES ext [phone].    Dawna Santa- Dialysis Coordinator  [facility] # [phone]

## 2020-02-26 NOTE — CARE PLAN
Problem: Knowledge Deficit  Goal: Knowledge of disease process/condition, treatment plan, diagnostic tests, and medications will improve  Outcome: PROGRESSING AS EXPECTED   Pt updated on POC, including medications, treatment and discharge planning. Questions answered as needed, pt verbalized understanding. Encouraged to voice further questions/concerns.      Problem: Psychosocial Needs:  Goal: Level of anxiety will decrease  Outcome: PROGRESSING AS EXPECTED    Pt encouraged to verbalized her feelings. Patient is helped to identify factors for stress and anxiety. Patient is taught to deep breathe and other forms of stress/anxiety management. Patient verbalizes understanding. Will continue to monitor.

## 2020-02-26 NOTE — PROGRESS NOTES
Received report from day shift RN.  Assumed care at 1900. Pt denies any discomfort at this time.  Call light within reach. Bed locked and in lowest position.  Non skid socks on, Belongings within  Reach.  Will continue to monitor hourly.

## 2020-02-26 NOTE — DISCHARGE SUMMARY
Discharge Summary    CHIEF COMPLAINT ON ADMISSION  No chief complaint on file.      Reason for Admission  GI bleed     Admission Date  2/19/2020    CODE STATUS  Full Code    HPI & HOSPITAL COURSE  This is a 79 y.o. male here with GI bleed.  He has a history of end-stage renal disease on dialysis Monday Wednesday and Friday, hypertension, hyperlipidemia, coronary disease with a stent placed 1 month ago who has been on aspirin and Plavix. Incidentally, he also had a recent hospitalization for anemia secondary to GI bleeding with unidentified source.  He presented again 2/17/2020 with dark stool and weakness.     Hemoglobin on admission was 5.5 with hematocrit of 18.0.  He received a total of 3 units of RBCs during his stay.  Following the first 2, he stabilized long enough to undergo EGD with push enteroscopy and control of bleeding as well as colonoscopy with polypectomy and control of bleeding.  2 small nonbleeding AVMs were seen in the proximal jejunum and were cauterized using APC.  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  There was no bleeding noted from this lesion.    The patient does receive EPO with dialysis treatments.  He was followed by nephrology during his stay and underwent dialysis.  He received 1 unit following the endoscopy and tolerated this without complications.  His hemoglobin and hematocrit improved.  He is currently in the process of doing the preoperative testing for transaortic valve replacement as he has severe aortic stenosis.  LDH was drawn during his stay and was normal.  Haptoglobin level was also drawn and is currently pending.    He will need to follow-up with his GI provider to discuss any further treatment.  I also called Dr. Matthews, his gastroenterologist, as well as his cardiologist at [facility], Dr. Godoy to discuss dual  antiplatelet therapy.  Given his bleed on DAPT he is a poor candidate for this therapy moving forward.  As such, he will likely rebleed and not qualify for TAVR.  Therefore the patient was counseled extensively on the risks of recurrent stent thrombosis with a single antiplatelet therapy versus risk for bleeding with DAPT.  His providers feel that his best chances of successful TAVR involve single antiplatelet therapy with Plavix.  Patient understands that these risks are difficult to quantify exactly but it is aware that he has a risk for stent re-thrombosis and will need to seek emergency care should he develop recurrent chest pain.  I will give him a lab slip for recheck of his CBC in a week and encouraged him to follow-up with his gastroenterologist and cardiologist in the next 1 to 2 weeks.    Therefore, he is discharged in good and stable condition to home with close outpatient follow-up.    Discharge Date  2/26/2020    FOLLOW UP ITEMS POST DISCHARGE  PCP follow-up  Gastroenterology follow-up  Cardiology follow-up  Haptoglobin lab review  CBC recheck in 1 week    DISCHARGE DIAGNOSES  Principal Problem (Resolved):    GI bleed POA: Yes  Active Problems:    Coronary artery disease POA: Yes      Overview: Coronary calcium was noted on chest CT scan again in 2010. Myocardial       perfusion imaging in 2011 showed no evidence of ischemia or infarction    PAD (peripheral artery disease) ([facility]) POA: Yes    ESRD (end stage renal disease) on dialysis ([facility]) POA: Yes    Severe aortic stenosis POA: Yes    HELGA (obstructive sleep apnea) POA: Yes    COPD (chronic obstructive pulmonary disease) ([facility]) POA: Yes    Dyslipidemia POA: Yes  Resolved Problems:    Acute on chronic blood loss anemia POA: Yes    Paroxysmal atrial flutter ([facility]) POA: Yes      FOLLOW UP  Future Appointments   Date Time Provider Department Center   2/27/2020 11:20 AM ROLDAN Daniel   2/27/2020 12:30 PM Young Goodwin M.D. [facility] None    3/17/2020  1:00 PM Babak Burch M.D. [facility] None     Juan Matthews M.D.  [address] [place] [address]  [phone]    Call  follow up      MEDICATIONS ON DISCHARGE     Medication List      Start taking these medications      Instructions   rosuvastatin 40 MG tablet  Commonly known as:  CRESTOR   Take 1 Tab by mouth every evening.  Dose:  40 mg        Continue taking these medications      Instructions   albuterol 108 (90 Base) MCG/ACT Aers inhalation aerosol   Inhale 1-2 Puffs by mouth every four hours as needed for Shortness of Breath.  Dose:  1-2 Puff     Calcium Acetate (Phos Binder) 667 MG Caps   TAKE 3 CAPSULES BY MOUTH WITH MEALS (3 MEALS) AND 2 CAPSULES WITH SNACKS (2 SNACKS)     clopidogrel 75 MG Tabs  Commonly known as:  PLAVIX   Take 75 mg by mouth every day.  Dose:  75 mg     DIALYVITE TABLET Tabs   TAKE ONE TABLET BY MOUTH DAILY     fluticasone 50 MCG/ACT nasal spray  Commonly known as:  FLONASE   Spray 1-2 Sprays in nose every day. Each nostril.  Dose:  1-2 Spray     Fluticasone-Umeclidin-Vilant 100-62.5-25 MCG/INH Aepb  Commonly known as:  Trelegy Ellipta   Inhale 1 Inhaler by mouth every day.  Dose:  1 Inhaler     levalbuterol 1.25 MG/3ML Nebu  Commonly known as:  Xopenex   Doctor's comments:  COPD J44.9  3 mL by Nebulization route every four hours as needed for Shortness of Breath.  Dose:  1.25 mg     montelukast 10 MG Tabs  Commonly known as:  SINGULAIR   Take 1 Tab by mouth every evening.  Dose:  10 mg     omeprazole 40 MG delayed-release capsule  Commonly known as:  PRILOSEC   TAKE ONE CAPSULE BY MOUTH DAILY     ROPINIRole 0.5 MG Tabs  Commonly known as:  REQUIP   TAKE TWO TABLETS BY MOUTH DAILY     tamsulosin 0.4 MG capsule  Commonly known as:  FLOMAX   Take 0.8 mg by mouth every day.  Dose:  0.8 mg     torsemide 10 MG tablet  Commonly known as:  DEMADEX   TAKE THREE TABLETS BY MOUTH DAILY     traZODone 150 MG Tabs  Commonly known as:  DESYREL   Take 150-300 mg by mouth every  bedtime.  Dose:  150-300 mg        Stop taking these medications    aspirin 81 MG Chew chewable tablet  Commonly known as:  ASA     pravastatin 20 MG Tabs  Commonly known as:  PRAVACHOL            Allergies  No Known Allergies    DIET  Orders Placed This Encounter   Procedures   • Diet Order Low Fiber(GI Soft)     Standing Status:   Standing     Number of Occurrences:   1     Order Specific Question:   Diet:     Answer:   Low Fiber(GI Soft) [2]       ACTIVITY  As tolerated.  Weight bearing as tolerated    CONSULTATIONS  Gastroenterology      PROCEDURES  EGD with push enteroscopy and control of bleeding, and colonoscopy with polypectomy and control bleeding; 2/24/2020 Dr. Juan Matthews    LABORATORY  Lab Results   Component Value Date    SODIUM 139 02/26/2020    POTASSIUM 4.4 02/26/2020    CHLORIDE 102 02/26/2020    CO2 27 02/26/2020    GLUCOSE 93 02/26/2020    BUN 48 (H) 02/26/2020    CREATININE 7.07 (HH) 02/26/2020    CREATININE 2.1 (H) 05/06/2009        Lab Results   Component Value Date    WBC 3.2 (L) 02/26/2020    HEMOGLOBIN 7.8 (L) 02/26/2020    HEMATOCRIT 24.7 (L) 02/26/2020    PLATELETCT 157 (L) 02/26/2020        Total time of the discharge process exceeds 40 minutes.

## 2020-02-26 NOTE — CARE PLAN
Problem: Safety  Goal: Will remain free from injury  Outcome: PROGRESSING AS EXPECTED   Pt and family updated on POC, including medications, treatment and discharge planning. Questions answered as needed, pt and family verbalized understanding. Encouraged to voice further questions/concerns.      Problem: Knowledge Deficit:  Goal: Ability to identify signs and symptoms of gastrointestinal bleeding will improve  Outcome: PROGRESSING AS EXPECTED  Safety precautions and fall prevention interventions in place. Fall prevention education provided, pt verbalized understanding. Bed in low/locked position, treaded socks on pt, call bell is within reach. Appropriate devices provided with ambulation assistance. Pt calls appropriately for help.

## 2020-02-26 NOTE — PROGRESS NOTES
Nephrology Daily Progress Note    Date of Service  2/26/2020    Chief Complaint  79 y.o. male with ESRD on HD Monday Wednesday Friday at [facility] who presented 2/17/2020 with low hemoglobin.    Interval Problem Update  2/21 -patient transferred to [facility] for capsule endoscopy.  Patient awaiting results of capsule endoscopy.  Patient denies chest pain, shortness of breath.  Denies bright red blood per rectum, but complains of continued dark stools.  2/25 patient feels better today, anxious to go home, no recent bowel movement the last 24-hour  2/26 patient is doing better, anxious to go home, plan for hemodialysis this afternoon as an outpatient  Review of Systems  Review of Systems   Constitutional: Negative for chills, fever and malaise/fatigue.   Respiratory: Negative for cough and shortness of breath.    Cardiovascular: Negative for chest pain and leg swelling.   Gastrointestinal: Negative for nausea and vomiting.   Genitourinary: Negative for dysuria, frequency and urgency.        Physical Exam  Temp:  [36.1 °C (97 °F)-37.4 °C (99.3 °F)] 36.8 °C (98.3 °F)  Pulse:  [] 92  Resp:  [16-28] 18  BP: (108-154)/(56-74) 121/65  SpO2:  [93 %-99 %] 93 %    Physical Exam  Vitals signs and nursing note reviewed.   Constitutional:       General: He is not in acute distress.     Appearance: He is not ill-appearing.   HENT:      Head: Normocephalic and atraumatic.      Right Ear: External ear normal.      Left Ear: External ear normal.      Nose: Nose normal.   Eyes:      General:         Right eye: No discharge.         Left eye: No discharge.      Conjunctiva/sclera: Conjunctivae normal.   Cardiovascular:      Rate and Rhythm: Normal rate and regular rhythm.      Heart sounds: No murmur.   Pulmonary:      Effort: Pulmonary effort is normal. No respiratory distress.      Breath sounds: Normal breath sounds. No wheezing.   Musculoskeletal:         General: No swelling, tenderness or deformity.   Skin:      General: Skin is warm.   Neurological:      General: No focal deficit present.      Mental Status: He is alert and oriented to person, place, and time.   Psychiatric:         Mood and Affect: Mood normal.         Behavior: Behavior normal.     Access: Right brachiocephalic AV fistula.  There is soft tissue swelling in the distal part of the upper arm near the elbow, deep to the access, but the access has patent bruit and thrill.    Fluids    Intake/Output Summary (Last 24 hours) at 2/26/2020 1032  Last data filed at 2/26/2020 0800  Gross per 24 hour   Intake 1020 ml   Output --   Net 1020 ml       Laboratory  Labs reviewed, pertinent labs below.  Recent Labs     02/24/20  0458  02/25/20  0335 02/25/20  0916 02/26/20  0545   WBC 3.1*  --  3.0*  --  3.2*   RBC 2.25*  --  2.29*  --  2.49*   HEMOGLOBIN 7.1*   < > 7.2* 6.9* 7.8*   HEMATOCRIT 22.7*  --  23.2*  --  24.7*   .9*  --  101.3*  --  99.2*   MCH 31.6  --  31.4  --  31.3   MCHC 31.3*  --  31.0*  --  31.6*   RDW 67.5*  --  67.2*  --  68.8*   PLATELETCT 177  --  170  --  157*   MPV 9.6  --  9.1  --  9.4    < > = values in this interval not displayed.     Recent Labs     02/24/20  1625 02/25/20  0335 02/26/20  0545   SODIUM 142 141 139   POTASSIUM 4.0 4.1 4.4   CHLORIDE 103 104 102   CO2 26 26 27   GLUCOSE 87 86 93   BUN 27* 31* 48*   CREATININE 4.83* 5.38* 7.07*   CALCIUM 9.0 9.0 9.1               URINALYSIS:  Lab Results   Component Value Date/Time    COLORURINE Yellow 03/19/2018 2009    CLARITY Sl Cloudy (A) 03/19/2018 2009    SPECGRAVITY 1.010 03/19/2018 2009    PHURINE 8.5 (A) 03/19/2018 2009    KETONES Negative 03/19/2018 2009    PROTEINURIN >=300 (A) 03/19/2018 2009    BILIRUBINUR Negative 03/19/2018 2009    NITRITE Negative 03/19/2018 2009    LEUKESTERAS Negative 03/19/2018 2009    OCCULTBLOOD Small (A) 03/19/2018 2009     [facility]  Lab Results   Component Value Date/Time    TOTPROTUR 774.0 (H) 01/09/2012 1644      Lab Results   Component Value Date/Time     CREATININEU 141.57 01/09/2012 1644         Imaging reviewed  No orders to display         Current Facility-Administered Medications   Medication Dose Route Frequency Provider Last Rate Last Dose   • enalaprilat (VASOTEC) injection 1.25 mg  1.25 mg Intravenous Q4HRS PRN Kash Blackwell M.D.   1.25 mg at 02/23/20 2155   • benzonatate (TESSALON) capsule 100 mg  100 mg Oral TID PRN Zaheer Monreal M.D.   100 mg at 02/25/20 2257   • ROPINIRole (REQUIP) tablet 1 mg  1 mg Oral QHS Shad Cole M.D.   1 mg at 02/25/20 2254   • ondansetron (ZOFRAN ODT) dispertab 4 mg  4 mg Oral Q4HRS PRN Urvashi Joy M.D.       • ondansetron (ZOFRAN) syringe/vial injection 4 mg  4 mg Intravenous Q4HRS PRN Urvashi Joy M.D.       • senna-docusate (PERICOLACE or SENOKOT S) 8.6-50 MG per tablet 2 Tab  2 Tab Oral BID Urvashi Joy M.D.   2 Tab at 02/26/20 0548    And   • polyethylene glycol/lytes (MIRALAX) PACKET 1 Packet  1 Packet Oral QDAY PRN Urvashi Joy M.D.        And   • bisacodyl (DULCOLAX) suppository 10 mg  10 mg Rectal QDAY PRN Urvashi Joy M.D.       • albumin human 25% solution 12.5 g  12.5 g Intravenous DIALYSIS PRN Urvashi Joy M.D.       • NS (BOLUS) infusion 250 mL  250 mL Intravenous DIALYSIS PRN Urvashi Joy M.D.       • lidocaine (XYLOCAINE) 1 % injection 1 mL  1 mL Intradermal PRN Urvashi Joy M.D.   1 mL at 02/24/20 0903   • albuterol inhaler 1-2 Puff  1-2 Puff Inhalation Q4HRS PRN Urvashi Joy M.D.       • aspirin (ASA) chewable tab 81 mg  81 mg Oral DAILY Urvashi Joy M.D.   81 mg at 02/26/20 0549   • calcium acetate (PHOS-LO) 667 MG tablet 667 mg  667 mg Oral TID AC Urvashi Joy M.D.   667 mg at 02/26/20 0736   • fluticasone (FLONASE) nasal spray  mcg  1-2 Spray Nasal DAILY Urvashi Joy M.D.   1 Spray at 02/26/20 0548   • fluticasone (FLOVENT HFA) 44 MCG/ACT inhaler 88 mcg  2 Puff Inhalation DAILY Urvashi Joy M.D.   88 mcg at 02/26/20 0548   • levalbuterol (XOPENEX) 1.25  MG/3ML nebulizer solution 1.25 mg  1.25 mg Nebulization Q4HRS PRN Urvashi Joy M.D.       • montelukast (SINGULAIR) tablet 10 mg  10 mg Oral Q EVENING Urvashi Joy M.D.   10 mg at 02/25/20 1808   • pantoprazole (PROTONIX) injection 40 mg  40 mg Intravenous BID Urvashi Joy M.D.   40 mg at 02/26/20 0549   • rosuvastatin (CRESTOR) tablet 40 mg  40 mg Oral Q EVENING Urvashi Joy M.D.   40 mg at 02/25/20 1808   • tamsulosin (FLOMAX) capsule 0.8 mg  0.8 mg Oral DAILY Urvashi Joy M.D.   0.8 mg at 02/26/20 0548   • traZODone (DESYREL) tablet 150-300 mg  150-300 mg Oral QHS Urvashi Joy M.D.   150 mg at 02/25/20 2359   • umeclidinium-vilanterol (ANORO ELLIPTA) inhaler 1 Puff  1 Puff Inhalation DAILY Urvashi Joy M.D.   1 Puff at 02/26/20 0548         Assessment/Plan  79 y.o. male with ESRD on HD Monday Wednesday Friday at [facility] who presented 2/17/2020 with low hemoglobin.     1. ESRD .  2. Access: R BC AVF  3. Acute blood loss anemia.   4. Anemia of CKD: Stable  HD this afternoon as an outpatient  Renal diet  Adjust erythropoietin dose  Renal dose all meds  Avoid nephrotoxins

## 2020-02-26 NOTE — PROGRESS NOTES
Patient discharged. A&O x 4. Discharge instructions, personal belongings in possession of a patient. PIV and tele monitor removed.  Copy of discharge instructions in the patient chart, signed and reviewed. Patient verbalizes the understanding of the discharge instructions. Questions / concerns addressed prior to leaving the unit. Patient is instructed to follow up with PCP and GI. Transported via wheelchair. Patient is discharged to home. Family is present.

## 2020-02-27 ENCOUNTER — OFFICE VISIT (OUTPATIENT)
Dept: CARDIOTHORACIC SURGERY | Facility: MEDICAL CENTER | Age: 80
End: 2020-02-27
Payer: MEDICARE

## 2020-02-27 ENCOUNTER — HOSPITAL ENCOUNTER (OUTPATIENT)
Dept: LAB | Facility: MEDICAL CENTER | Age: 80
End: 2020-02-27
Attending: FAMILY MEDICINE
Payer: MEDICARE

## 2020-02-27 ENCOUNTER — OFFICE VISIT (OUTPATIENT)
Dept: MEDICAL GROUP | Facility: MEDICAL CENTER | Age: 80
End: 2020-02-27
Payer: MEDICARE

## 2020-02-27 VITALS
TEMPERATURE: 98.4 F | HEIGHT: 68 IN | WEIGHT: 165.34 LBS | DIASTOLIC BLOOD PRESSURE: 64 MMHG | SYSTOLIC BLOOD PRESSURE: 144 MMHG | HEART RATE: 98 BPM | RESPIRATION RATE: 14 BRPM | BODY MASS INDEX: 25.06 KG/M2 | OXYGEN SATURATION: 95 %

## 2020-02-27 VITALS
SYSTOLIC BLOOD PRESSURE: 128 MMHG | WEIGHT: 166 LBS | OXYGEN SATURATION: 92 % | HEIGHT: 69 IN | HEART RATE: 100 BPM | TEMPERATURE: 98.4 F | DIASTOLIC BLOOD PRESSURE: 52 MMHG | BODY MASS INDEX: 24.59 KG/M2

## 2020-02-27 DIAGNOSIS — Q27.30 AVM (ARTERIOVENOUS MALFORMATION): ICD-10-CM

## 2020-02-27 DIAGNOSIS — D63.1 ANEMIA IN CHRONIC KIDNEY DISEASE, ON CHRONIC DIALYSIS (HCC): ICD-10-CM

## 2020-02-27 DIAGNOSIS — Z99.2 ANEMIA IN CHRONIC KIDNEY DISEASE, ON CHRONIC DIALYSIS (HCC): ICD-10-CM

## 2020-02-27 DIAGNOSIS — N18.6 ANEMIA IN CHRONIC KIDNEY DISEASE, ON CHRONIC DIALYSIS (HCC): ICD-10-CM

## 2020-02-27 DIAGNOSIS — Z09 HOSPITAL DISCHARGE FOLLOW-UP: ICD-10-CM

## 2020-02-27 DIAGNOSIS — I35.0 SEVERE AORTIC STENOSIS: ICD-10-CM

## 2020-02-27 DIAGNOSIS — I35.0 CALCIFIC AORTIC STENOSIS: ICD-10-CM

## 2020-02-27 LAB
ERYTHROCYTE [DISTWIDTH] IN BLOOD BY AUTOMATED COUNT: 68.4 FL (ref 35.9–50)
HAPTOGLOB SERPL-MCNC: 27 MG/DL (ref 30–200)
HCT VFR BLD AUTO: 26.4 % (ref 42–52)
HGB BLD-MCNC: 7.9 G/DL (ref 14–18)
MCH RBC QN AUTO: 30.2 PG (ref 27–33)
MCHC RBC AUTO-ENTMCNC: 29.9 G/DL (ref 33.7–35.3)
MCV RBC AUTO: 100.8 FL (ref 81.4–97.8)
PLATELET # BLD AUTO: 166 K/UL (ref 164–446)
PMV BLD AUTO: 9.8 FL (ref 9–12.9)
RBC # BLD AUTO: 2.62 M/UL (ref 4.7–6.1)
WBC # BLD AUTO: 3.3 K/UL (ref 4.8–10.8)

## 2020-02-27 PROCEDURE — 36415 COLL VENOUS BLD VENIPUNCTURE: CPT

## 2020-02-27 PROCEDURE — 99215 OFFICE O/P EST HI 40 MIN: CPT | Performed by: FAMILY MEDICINE

## 2020-02-27 PROCEDURE — 99205 OFFICE O/P NEW HI 60 MIN: CPT | Performed by: THORACIC SURGERY (CARDIOTHORACIC VASCULAR SURGERY)

## 2020-02-27 PROCEDURE — 85027 COMPLETE CBC AUTOMATED: CPT

## 2020-02-27 ASSESSMENT — PATIENT HEALTH QUESTIONNAIRE - PHQ9
CLINICAL INTERPRETATION OF PHQ2 SCORE: 1
5. POOR APPETITE OR OVEREATING: 0 - NOT AT ALL
SUM OF ALL RESPONSES TO PHQ QUESTIONS 1-9: 4

## 2020-02-27 ASSESSMENT — ENCOUNTER SYMPTOMS
CARDIOVASCULAR NEGATIVE: 1
PSYCHIATRIC NEGATIVE: 1
NEUROLOGICAL NEGATIVE: 1
MUSCULOSKELETAL NEGATIVE: 1
SHORTNESS OF BREATH: 1
CONSTITUTIONAL NEGATIVE: 1
BLOOD IN STOOL: 1
EYES NEGATIVE: 1

## 2020-02-27 NOTE — PROGRESS NOTES
"  REFERRING PHYSICIAN: Cristiano Godoy MD.     CONSULTING PHYSICIAN: Young Goodwin MD, FACS.    CHIEF COMPLAINT: fatigue    HISTORY OF PRESENT ILLNESS: The patient is a 79 y.o. male with a history of end stage renal disease requiring dialysis, chronic anemia, GI bleed, CAD requiring stenting of the LAD and aortic stenosis.  He recently was hospitalized with a GI requiring blood transfusions.  He has a history of aortic stenosis.  His most recent echo showed a progression of his stenosis.  His symptoms are fatigue but most likely related to his GI bleed and chronic anemia and shortness of breath.  He is here today for consideratio of TAVR versus SAVR.    PAST MEDICAL HISTORY:   Past Medical History:   Diagnosis Date   • Anesthesia     \"needs more sedation\" (can sometimes hear MD during procedure)    • Anticoagulation monitoring, special range    • Aortic stenosis     mod AS- concern for low flow severe AS with rEFof 30%   • Arterial leg ulcer ([facility]) 11/8/2018   • Atrial flutter ([facility]) 6/12/2019   • Basal cell carcinoma of left cheek 3/26/2015   • BPH 7/14/2009   • Bronchitis 12/25/2018   • CAD (coronary artery disease) 2/20/2014   • CATARACT     sanjuanita surgery complete   • CHF (congestive heart failure), NYHA class II, chronic, systolic ([facility]) E F30 in setting of atrial flutter 6/13/2019   • Chronic respiratory failure with hypoxia ([facility]) 5/8/2016   • CKD (chronic kidney disease) stage 4, GFR 15-29 ml/min ([facility]) 1/15/2010    end stage renal disease   • COPD (chronic obstructive pulmonary disease) ([facility])     wears 2.5 L o2 sometimes when he sleeps   • Detached retina    • Dialysis     m,w,f [place]   • EMPHYSEMA    • Glaucoma     Early stage   • Gout    • Heart burn     occas   • Heart murmur     maria esther lee cardiologist   • Hypertension    • Indigestion     occas   • Kidney cyst    • Leg pain, bilateral 8/10/2015   • On supplemental oxygen therapy    • Peripheral vascular disease ([facility]) 8/10/2015   • Pneumonia  "   • Primary insomnia 3/22/2018   • Proteinuria    • PVD (peripheral vascular disease) ([facility])    • Sleep apnea     O2 PER CANULA  AT NIGHT 2l/m       PAST SURGICAL HISTORY:   Past Surgical History:   Procedure Laterality Date   • PB COLONOSCOPY,FLEX,W/CONTROL, BLEEDING N/A 2/24/2020    Procedure: COLONOSCOPY, WITH ARGON PLASMA COAGULATION;  Surgeon: Juan Matthews M.D.;  Location: SURGERY SAME DAY [facility];  Service: Gastroenterology   • GASTROSCOPY W/PUSH ENTERSCOPY N/A 2/24/2020    Procedure: GASTROSCOPY, WITH PUSH ENTEROSCOPY;  Surgeon: Juan Matthews M.D.;  Location: SURGERY SAME DAY [facility];  Service: Gastroenterology   • PB COLONOSCOPY,DIAGNOSTIC N/A 2/23/2020    Procedure: COLONOSCOPY;  Surgeon: Enrique Child D.O.;  Location: SURGERY [facility];  Service: Gastroenterology   • GASTROSCOPY N/A 2/23/2020    Procedure: GASTROSCOPY;  Surgeon: Enrique Child D.O.;  Location: SURGERY [facility];  Service: Gastroenterology   • CAPSULE ENDOSCOPY ADMINISTRATION N/A 2/20/2020    Procedure: ADMINISTRATION, CAPSULE, FOR CAPSULE ENDOSCOPY;  Surgeon: Gucci Westbrook M.D.;  Location: ENDOSCOPY [facility];  Service: Gastroenterology   • CAPSULE ENDOSCOPY RETRIEVAL  2/20/2020    Procedure: CAPSULE ENDOSCOPY RETRIEVAL;  Surgeon: Gucci Westbrook M.D.;  Location: ENDOSCOPY [facility];  Service: Gastroenterology   • PB SMALL BOWEL ENDOSCOPY,PAST 2ND DUOD  2/19/2020        • COLONOSCOPY - ENDO  2/19/2020        • PB COLONOSCOPY,DIAGNOSTIC  2/19/2020    Procedure: COLONOSCOPY;  Surgeon: Gucci Westbrook M.D.;  Location: SURGERY [facility];  Service: Gastroenterology   • GASTROSCOPY  2/19/2020    Procedure: GASTROSCOPY;  Surgeon: Gucci Westbrook M.D.;  Location: SURGERY [facility];  Service: Gastroenterology   • GASTROSCOPY-ENDO  2/7/2020    Procedure: GASTROSCOPY;  Surgeon: Jong Carrasquillo M.D.;  Location: ENDOSCOPY [facility];  Service: Gastroenterology   • STENT  PLACEMENT  01/26/2020    lda   • AV FISTULA CREATION Right 8/6/2019    Procedure: CREATION, AV FISTULA -  RIGHT ARM  ,  and Ligation of Left Arm Fistula;  Surgeon: Sera Peters M.D.;  Location: SURGERY [facility];  Service: General   • CATH PLACEMENT Right 8/6/2019    Procedure: INSERTION, CATHETER - PERMA ,;  Surgeon: Sera Peters M.D.;  Location: SURGERY [facility];  Service: General   • JUSTIN  6/13/2019    Procedure: ECHOCARDIOGRAM, TRANSESOPHAGEAL;  Surgeon: Harvinder Hoang M.D.;  Location: SURGERY [facility];  Service: Cardiac   • CARDIOVERSION  6/13/2019    Procedure: CARDIOVERSION;  Surgeon: Harvinder Hoang M.D.;  Location: [facility];  Service: Cardiac   • AV FISTULA CREATION Right 2/21/2019    Procedure: AV FISTULA CREATION - ARM;  Surgeon: David Cason M.D.;  Location: SURGERY [facility];  Service: General   • FEMORAL ENDARTERECTOMY Left 1/25/2019    Procedure: FEMORAL ENDARTERECTOMY;  Surgeon: David Cason M.D.;  Location: SURGERY [facility];  Service: General   • FEMORAL POPLITEAL BYPASS Left 1/25/2019    Procedure: LEFT FEMORAL POPLITEAL POLYTETRAFLUOROETHYLENE ePTFE(PROPATEN VASCULAR GRAFT) BYPASS;  Surgeon: David Cason M.D.;  Location: SURGERY [facility];  Service: General   • ANGIOGRAM Left 1/25/2019    Procedure: LEFT LEG ANGIOGRAM;  Surgeon: David Cason M.D.;  Location: SURGERY [facility];  Service: General   • ENDOSCOPY PROCEDURE  3/21/2018    Procedure: ENDOSCOPY PROCEDURE/UPPER;  Surgeon: Enrique Child D.O.;  Location: SURGERY [facility];  Service: Gastroenterology   • COLONOSCOPY - ENDO  8/15/2016    Procedure: COLONOSCOPY - ENDO;  Surgeon: Mane Whatley M.D.;  Location: ENDOSCOPY [facility];  Service:    • GASTROSCOPY WITH BIOPSY  8/13/2016    Procedure: GASTROSCOPY WITH BIOPSY;  Surgeon: Jorge Leavitt M.D.;  Location: ENDOSCOPY [facility];  Service:    • RECOVERY  12/23/2015     Procedure: VASCULAR CASE-BLANKA-LEFT ARM FISTULOGRAM WITH ANGIOPLASTY;  Surgeon: Elmo Surgery;  Location: SURGERY PRE-POST PROC UNIT [facility];  Service:    • RECOVERY  3/24/2015    Performed by [facility] at SURGERY PRE-POST PROC UNIT [facility]   • RECOVERY  7/29/2014    Performed by [facility] at SURGERY SAME DAY [facility]   • RECOVERY  3/24/2014    Performed by [facility] at SURGERY [facility]   • RECOVERY  12/17/2013    Performed by [facility] at SURGERY SAME DAY [facility]   • RECOVERY  7/2/2013    Performed by [facility] at SURGERY SAME DAY [facility]   • AV FISTULA THROMBOLYSIS  7/2/2013    Performed by David Cason M.D. at SURGERY [facility]   • RECOVERY  1/29/2013    Performed by [facility] at SURGERY SAME DAY [facility]   • RECOVERY  7/23/2012    Performed by SURGERY, IR-RECOVERY at SURGERY SAME DAY [facility]   • VITRECTOMY POSTERIOR  10/11/2011    Performed by NAHUM GE at SURGERY SAME DAY [facility]   • RECOVERY  8/12/2011    Performed by SURGERY, IR-RECOVERY at SURGERY SAME DAY [facility]   • VITRECTOMY POSTERIOR  1/18/2011    Performed by NAHUM GE at SURGERY SAME DAY [facility]   • SCLERAL BUCKLING  1/18/2011    Performed by NAHUM GE at SURGERY SAME DAY [facility]   • AV FISTULOGRAM  9/17/2010    Performed by DAVID CASON at SURGERY [facility]   • ANGIOPLASTY BALLOON  9/17/2010    Performed by DAVID CASON at SURGERY [facility]   • AV FISTULOGRAM  7/23/2010    Performed by DAVID CASON at SURGERY [facility]   • ANGIOPLASTY BALLOON  7/23/2010    Performed by DAVID CASON at SURGERY [facility]   • AV FISTULA REVISION  2/19/2010    Performed by WILLIE HATCH at SURGERY [facility]   • AV FISTULA CREATION  2/12/2010    Performed by DAVID CASON at SURGERY [facility]   • CATARACT PHACO WITH IOL  4/8/08    Performed by STACEY PHILLIPS  at SURGERY SAME DAY [facility]   • OTHER ORTHOPEDIC SURGERY      right toe for facititis       FAMILY HISTORY:   Family History   Problem Relation Age of Onset   • Stroke Mother    • Hypertension Mother    • Lung Disease Father         Emphysema, resp failure   • Genitourinary () Problems Maternal Aunt         hematuria   • Hypertension Brother         SOCIAL HISTORY:   Social History     Socioeconomic History   • Marital status:      Spouse name: Not on file   • Number of children: Not on file   • Years of education: Not on file   • Highest education level: Not on file   Occupational History   • Not on file   Social Needs   • Financial resource strain: Not on file   • Food insecurity     Worry: Not on file     Inability: Not on file   • Transportation needs     Medical: Not on file     Non-medical: Not on file   Tobacco Use   • Smoking status: Former Smoker     Packs/day: 1.00     Years: 40.00     Pack years: 40.00     Types: Cigarettes     Last attempt to quit: 2009     Years since quittin.1   • Smokeless tobacco: Never Used   • Tobacco comment: 1 pk a day for 35 yrs, QUIT 2010   Substance and Sexual Activity   • Alcohol use: No     Alcohol/week: 0.0 oz   • Drug use: No   • Sexual activity: Never     Partners: Female     Comment: , two sons, 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   Lifestyle   • Physical activity     Days per week: Not on file     Minutes per session: Not on file   • Stress: Not on file   Relationships   • Social connections     Talks on phone: Not on file     Gets together: Not on file     Attends Jehovah's witness service: Not on file     Active member of club or organization: Not on file     Attends meetings of clubs or organizations: Not on file     Relationship status: Not on file   • Intimate partner violence     Fear of current or ex partner: Not on file     Emotionally abused: Not on file     Physically abused: Not on file     Forced sexual activity: Not on file   Other  Topics Concern   • Not on file   Social History Narrative   • Not on file       ALLERGIES:   No Known Allergies     CURRENT MEDICATIONS:     Current Outpatient Medications:   •  rosuvastatin (CRESTOR) 40 MG tablet, Take 1 Tab by mouth every evening., Disp: 30 Tab, Rfl: 11  •  clopidogrel (PLAVIX) 75 MG Tab, Take 75 mg by mouth every day., Disp: , Rfl:   •  tamsulosin (FLOMAX) 0.4 MG capsule, Take 0.8 mg by mouth every day., Disp: , Rfl:   •  albuterol 108 (90 Base) MCG/ACT Aero Soln inhalation aerosol, Inhale 1-2 Puffs by mouth every four hours as needed for Shortness of Breath., Disp: , Rfl:   •  ROPINIRole (REQUIP) 0.5 MG Tab, TAKE TWO TABLETS BY MOUTH DAILY, Disp: 180 Tab, Rfl: 2  •  omeprazole (PRILOSEC) 40 MG delayed-release capsule, TAKE ONE CAPSULE BY MOUTH DAILY, Disp: 30 Cap, Rfl: 2  •  Fluticasone-Umeclidin-Vilant (TRELEGY ELLIPTA) 100-62.5-25 MCG/INH AEROSOL POWDER, BREATH ACTIVATED, Inhale 1 Inhaler by mouth every day., Disp: 1 Each, Rfl: 6  •  montelukast (SINGULAIR) 10 MG Tab, Take 1 Tab by mouth every evening., Disp: 90 Tab, Rfl: 3  •  fluticasone (FLONASE) 50 MCG/ACT nasal spray, Spray 1-2 Sprays in nose every day. Each nostril., Disp: 1 Bottle, Rfl: 6  •  traZODone (DESYREL) 150 MG Tab, Take 150-300 mg by mouth every bedtime., Disp: , Rfl:   •  B Complex-C-Folic Acid (DIALYVITE TABLET) Tab, TAKE ONE TABLET BY MOUTH DAILY, Disp: 90 Tab, Rfl: 3  •  Calcium Acetate, Phos Binder, 667 MG Cap, TAKE 3 CAPSULES BY MOUTH WITH MEALS (3 MEALS) AND 2 CAPSULES WITH SNACKS (2 SNACKS), Disp: 420 Cap, Rfl: 11  •  torsemide (DEMADEX) 10 MG tablet, TAKE THREE TABLETS BY MOUTH DAILY, Disp: 270 Tab, Rfl: 3  •  levalbuterol (XOPENEX) 1.25 MG/3ML Nebu Soln, 3 mL by Nebulization route every four hours as needed for Shortness of Breath., Disp: 120 Bullet, Rfl: 11     LABS REVIEWED:  Lab Results   Component Value Date/Time    SODIUM 139 02/26/2020 05:45 AM    POTASSIUM 4.4 02/26/2020 05:45 AM    CHLORIDE 102 02/26/2020  05:45 AM    CO2 27 02/26/2020 05:45 AM    GLUCOSE 93 02/26/2020 05:45 AM    BUN 48 (H) 02/26/2020 05:45 AM    CREATININE 7.07 (HH) 02/26/2020 05:45 AM    CREATININE 2.1 (H) 05/06/2009 10:08 AM      Lab Results   Component Value Date/Time    PROTHROMBTM 14.1 02/19/2020 12:16 AM    INR 1.08 02/19/2020 12:16 AM      Lab Results   Component Value Date/Time    WBC 3.2 (L) 02/26/2020 05:45 AM    RBC 2.49 (L) 02/26/2020 05:45 AM    HEMOGLOBIN 7.8 (L) 02/26/2020 05:45 AM    HEMATOCRIT 24.7 (L) 02/26/2020 05:45 AM    MCV 99.2 (H) 02/26/2020 05:45 AM    MCH 31.3 02/26/2020 05:45 AM    MCHC 31.6 (L) 02/26/2020 05:45 AM    MPV 9.4 02/26/2020 05:45 AM    NEUTSPOLYS 75.00 (H) 02/26/2020 05:45 AM    LYMPHOCYTES 11.70 (L) 02/26/2020 05:45 AM    MONOCYTES 9.90 02/26/2020 05:45 AM    EOSINOPHILS 2.80 02/26/2020 05:45 AM    EOSINOPHILS 2 10/26/2019 05:55 PM    BASOPHILS 0.30 02/26/2020 05:45 AM    HYPOCHROMIA 1+ 01/17/2020 12:50 PM    ANISOCYTOSIS 1+ 02/25/2020 03:35 AM        IMAGING REVIEWED AND INTERPRETED:    ECHOCARDIOGRAM:   Compared to the images of the prior study done 10/24/2019, no   significant change.  Limited exam requested, no RVSP.  Left ventricular ejection fraction is visually estimated to be 60%.  Mild concentric left ventricular hypertrophy.  Grade II diastolic dysfunction.  Severely dilated left atrium.  Heavily calcified trileaflet aortic valve with severe aortic stenosis:   Vmax is 4.3  m/s, Mg 41 mmHg, CELIO 0.8 cm2.  Mild mitral regurgitation.    ANGIOGRAM:   LEFT MAIN: Large-caliber short vessel bifurcating into an LAD and left circumflex without angiographically significant atherosclerotic disease  LAD: Medium large-caliber vessel traversing the anterior wall and wrapping around the apex gives off small to medium mid vessel diagonal and a second small mid to distal diagonal and multiple septal perforators.  The mid LAD is a heavily calcified greater than 70% stenosis at the level of the first diagonal.  LCX:  "Large-caliber dominant vessel giving off a high small anterolateral branch and a small mid vessel OM as well as a distal large branching OM and a small to medium distal PLV with no angiographically significant atherosclerotic disease  RCA: Small non-dominant vessel giving off a small to medium PDA and several small to medium caliber RV marginal branch with no angiographically significant atherosclerotic disease    REVIEW OF SYSTEMS:   Review of Systems   Constitutional: Negative.    HENT: Negative.    Eyes: Negative.    Respiratory: Positive for shortness of breath.    Cardiovascular: Negative.    Gastrointestinal: Positive for blood in stool.   Genitourinary: Negative.    Musculoskeletal: Negative.    Skin: Negative.    Neurological: Negative.    Endo/Heme/Allergies: Negative.    Psychiatric/Behavioral: Negative.      All other systems were reviewed with the patient and are negative.    PHYSICAL EXAMINATION:   CONSTITUTIONAL:  /52 (BP Location: Left arm, Patient Position: Sitting, BP Cuff Size: Adult)   Pulse 100   Temp 36.9 °C (98.4 °F) (Temporal)   Ht 1.74 m (5' 8.5\")   Wt 75.3 kg (166 lb)   SpO2 92%     Physical Exam   Constitutional: He is oriented to person, place, and time and well-developed, well-nourished, and in no distress. No distress.   HENT:   Head: Normocephalic and atraumatic.   Eyes: Pupils are equal, round, and reactive to light.   Neck: Normal range of motion. Neck supple. No JVD present.   Cardiovascular: Normal rate and regular rhythm.   Pulmonary/Chest: Effort normal and breath sounds normal.   Abdominal: Soft. Bowel sounds are normal.   Musculoskeletal: Normal range of motion.         General: No edema.   Neurological: He is alert and oriented to person, place, and time.   Skin: Skin is warm and dry.   Psychiatric: Mood, memory, affect and judgment normal.     IMPRESSION:  Severe aortic stenosis (calcific/degenerative)    PLAN:  I recommend TAVR not SAVR    The procedure, its risks, " benefits, potential complications and alternative treatments were discussed with the patient in detail including the risks should he decide not to undergo my recommended treatment. All of his questions were answered to his satisfaction and he is willing to proceed with the operation. The risks include death, stroke,  infection: to include a rare bacterial infection related to the use of the heart/lung machine, ena-operative myocardial infarction, dysrhythmias, diaphragmatic paralysis, chest wall paresthesia, tracheostomy, kidney or other organ failure, possible return to the operating room for bleeding, bleeding requiring transfusion with its attendant risks including AIDS or hepatitis, dehiscence of surgical incisions, respiratory complications including the need for prolonged ventilator support, Protamine or other drug reaction, peripheral neuropathy, loss of limb, and miscount of surgical items. The STS mortality risk score is 11.3% and the morbidity and mortality risk score is 47%. The scores were discussed with patient.    Thank you for this very challenging consultation and participation in the patient’s care.  I will keep you apprised of all future developments.      Sincerely,       Young Godowin MD, FACS.

## 2020-02-27 NOTE — ASSESSMENT & PLAN NOTE
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  They were treated by the gastroenterologist and at discharge from the hospital his hemoglobin was 7.8.  We will plan to have him start doing a weekly CBC so we can follow his hemoglobin and hematocrit hopefully seeing it continues to improve.  He is getting Epogen with his dialysis.

## 2020-02-27 NOTE — ASSESSMENT & PLAN NOTE
This is a chronic health problem for this patient where he recently has had multiple GI bleeds and required transfusions.  We are going to set him up with a standing order for CBC without differential so that he can get his hemoglobin and hematocrit checked as often as weekly if needed.  His last labs done before they dismissed him from the hospital showed a hemoglobin of 7.8 and that was on 2/25/2020.

## 2020-02-27 NOTE — ASSESSMENT & PLAN NOTE
Patient is in the process of a work-up for his severe aortic stenosis and trying to put together a replacement valve.  He will do further testing on 3/2/2020 and then they will determine when would be the best timing to get that valve done.

## 2020-02-28 ENCOUNTER — TELEPHONE (OUTPATIENT)
Dept: MEDICAL GROUP | Facility: MEDICAL CENTER | Age: 80
End: 2020-02-28

## 2020-02-28 NOTE — PROGRESS NOTES
CC:Diagnoses of Anemia in chronic kidney disease, on chronic dialysis (HCC), AVM (arteriovenous malformation), Severe aortic stenosis, and Hospital discharge follow-up were pertinent to this visit.    HISTORY OF PRESENT ILLNESS: Patient is a 79 y.o. male established patient who presents today to talk about his recent hospitalization for half the month of February secondary to anemia and then his upcoming attempts to get a TAVR valve replacement.  Patient is accompanied today by his wife who adds to his health history      Anemia in CKD (chronic kidney disease)  This is a chronic health problem for this patient where he recently has had multiple GI bleeds and required transfusions.  We are going to set him up with a standing order for CBC without differential so that he can get his hemoglobin and hematocrit checked as often as weekly if needed.  His last labs done before they dismissed him from the hospital showed a hemoglobin of 7.8 and that was on 2/25/2020.    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  They were treated by the gastroenterologist and at discharge from the hospital his hemoglobin was 7.8.  We will plan to have him start doing a weekly CBC so we can follow his hemoglobin and hematocrit hopefully seeing it continues to improve.  He is getting Epogen with his dialysis.    Severe aortic stenosis  Patient is in the process of a work-up for his severe aortic stenosis and trying to put together a replacement valve.  He will do further testing on 3/2/2020 and then they will determine when would be the best timing to get that valve done.    Hospital discharge follow-up  This appointment today is to follow-up from his recent hospitalization of 2/19/2020 and final discharge of 2/26/2020.  Patient was dismissed on 2/17 and presented immediately that same day with dark stool and weakness and his hemoglobin  had dropped to 5.5.  He was dismissed yesterday with a hemoglobin of 7.8 and we need to do weekly CBCs to make sure he is holding his hemoglobin and hematocrit.  He is hoping to be able to get his TAVR valve replacement within the next month or so.  He has further testing being done on March 2.  Patient is accompanied today by his wife who listens and on our entire conversation and we reviewed all of his studies from his hospitalization and his recent visit with the cardiothoracic team.      Patient Active Problem List    Diagnosis Date Noted   • Transudative pleural effusion 10/24/2019     Priority: High   • Chronic anticoagulation 06/12/2019     Priority: Medium   • Severe aortic stenosis 04/20/2017     Priority: Medium   • Essential hypertension 05/11/2016     Priority: Medium   • ESRD (end stage renal disease) on dialysis ([facility]) 05/08/2016     Priority: Medium   • PAD (peripheral artery disease) ([facility]) 08/10/2015     Priority: Medium   • Coronary artery disease 02/20/2014     Priority: Medium   • History of basal cell carcinoma (BCC) 03/07/2019     Priority: Low   • Acute gastric ulcer 10/19/2018     Priority: Low   • Primary insomnia 03/22/2018     Priority: Low   • Gastric polyps 03/21/2018     Priority: Low   • Esophagitis 03/21/2018     Priority: Low   • Uremia 03/20/2018     Priority: Low   • Pedal edema 03/21/2017     Priority: Low   • AVM (arteriovenous malformation) 09/08/2016     Priority: Low   • Renal cyst 05/11/2016     Priority: Low   • Hyperkalemia, diminished renal excretion 05/08/2016     Priority: Low   • Leukocytosis 05/08/2016     Priority: Low   • Uric acid arthropathy 01/07/2016     Priority: Low   • Primary osteoarthritis of both hands 08/20/2015     Priority: Low   • Anemia in CKD (chronic kidney disease) 05/29/2015     Priority: Low   • Dyslipidemia 12/05/2014     Priority: Low   • 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HCC) 07/02/2013     Priority: Low   • Secondary renal hyperparathyroidism ([facility])  12/14/2012     Priority: Low   • COPD (chronic obstructive pulmonary disease) ([facility]) 08/02/2011     Priority: Low   • HELGA (obstructive sleep apnea) 05/13/2011     Priority: Low   • Hard of hearing 04/22/2011     Priority: Low   • BPH (benign prostatic hypertrophy) 07/14/2009     Priority: Low   • Contusion of right upper extremity 02/06/2020   • Hyperkalemia 02/06/2020   • ESRD (end stage renal disease) ([facility]) 02/06/2020   • Bronchitis 12/10/2019   • Chronic cough 11/14/2019   • Hospital discharge follow-up 09/17/2019   • 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    Current Outpatient Medications   Medication Sig Dispense Refill   • rosuvastatin (CRESTOR) 40 MG tablet Take 1 Tab by mouth every evening. 30 Tab 11   • clopidogrel (PLAVIX) 75 MG Tab Take 75 mg by mouth every day.     • tamsulosin (FLOMAX) 0.4 MG capsule Take 0.8 mg by mouth every day.     • albuterol 108 (90 Base) MCG/ACT Aero Soln inhalation aerosol Inhale 1-2 Puffs by mouth every four hours as needed for Shortness of Breath.     • ROPINIRole (REQUIP) 0.5 MG Tab TAKE TWO TABLETS BY MOUTH DAILY 180 Tab 2   • omeprazole (PRILOSEC) 40 MG delayed-release capsule TAKE ONE CAPSULE BY MOUTH DAILY 30 Cap 2   • Fluticasone-Umeclidin-Vilant (TRELEGY ELLIPTA) 100-62.5-25 MCG/INH AEROSOL POWDER, BREATH ACTIVATED Inhale 1 Inhaler by mouth every day. 1 Each 6   • montelukast (SINGULAIR) 10 MG Tab Take 1 Tab by mouth every evening. 90 Tab 3   • fluticasone (FLONASE) 50 MCG/ACT nasal spray Spray 1-2 Sprays in nose every day. Each nostril. 1 Bottle 6   • traZODone (DESYREL) 150 MG Tab Take 150-300 mg by mouth every bedtime.     • B Complex-C-Folic Acid (DIALYVITE TABLET) Tab TAKE ONE TABLET BY MOUTH DAILY 90 Tab 3   • Calcium Acetate, Phos Binder, 667 MG Cap TAKE 3 CAPSULES  BY MOUTH WITH MEALS (3 MEALS) AND 2 CAPSULES WITH SNACKS (2 SNACKS) 420 Cap 11   • torsemide (DEMADEX) 10 MG tablet TAKE THREE TABLETS BY MOUTH DAILY 270 Tab 3   • levalbuterol (XOPENEX) 1.25 MG/3ML Nebu Soln 3 mL by Nebulization route every four hours as needed for Shortness of Breath. 120 Bullet 11     No current facility-administered medications for this visit.        Social History     Tobacco Use   • Smoking status: Former Smoker     Packs/day: 1.00     Years: 40.00     Pack years: 40.00     Types: Cigarettes     Last attempt to quit: 2009     Years since quittin.1   • Smokeless tobacco: Never Used   • Tobacco comment: 1 pk a day for 35 yrs, QUIT 2010   Substance Use Topics   • Alcohol use: No     Alcohol/week: 0.0 oz   • Drug use: No     Social History     Social History Narrative   • Not on file       Family History   Problem Relation Age of Onset   • Stroke Mother    • Hypertension Mother    • Lung Disease Father         Emphysema, resp failure   • Genitourinary () Problems Maternal Aunt         hematuria   • Hypertension Brother         ROS:     - Constitutional: Patient complains of weakness and fatigue on a daily basis but denies fevers and chills.     - HEENT:  Negative for headaches, vision changes, hearing changes, ear pain, ear discharge, rhinorrhea, sinus congestion, sore throat, and neck pain.      - Respiratory: Negative for cough, sputum production, chest congestion, dyspnea, wheezing, and crackles.      - Cardiovascular: Negative for chest pain, palpitations, orthopnea, and bilateral lower extremity edema.     - Gastrointestinal: Negative for heartburn, nausea, vomiting, abdominal pain, hematochezia, melena, diarrhea, constipation, and greasy/foul-smelling stools.     - Genitourinary: Negative for dysuria, polyuria, hematuria, pyuria, urinary urgency, and urinary incontinence.     - Musculoskeletal: Patient complaining of deep muscle aches and pains throughout his lower  "extremities and low back.      - Skin: Negative for rash, itching, cyanotic skin color change.     - Neurological: Negative for dizziness, tingling, tremors, focal sensory deficit, focal weakness and headaches.     - Endo/Heme/Allergies: Does not bruise/bleed easily.     - Psychiatric/Behavioral: Negative for depression, suicidal/homicidal ideation and memory loss.          - NOTE: All other systems reviewed and are negative, except as in HPI.      Exam:    /64 (BP Location: Left arm, Patient Position: Sitting, BP Cuff Size: Adult)   Pulse 98   Temp 36.9 °C (98.4 °F)   Resp 14   Ht 1.727 m (5' 8\")   Wt 75 kg (165 lb 5.5 oz)   SpO2 95%  Body mass index is 25.14 kg/m².    General: Thin elderly gentleman who appears older than his stated age.  Head is grossly normal.  Neck: Supple without JVD or bruit. Thyroid is not enlarged.  Pulmonary: Clear to ausculation and percussion. Normal effort. No rales, ronchi, or wheezing.  Cardiovascular: Regular rate and rhythm.Carotid and radial pulses are intact and equal bilaterally.  Extremities: no clubbing, cyanosis, or edema.  Patient was seen for 40 minutes face to face of which, 35 minutes was spent counseling regarding talking about his entire hospitalization and upcoming challenges.  Also discussed follow-up to try and make certain his hemoglobin and hematocrit are staying in the normal range.  Patient will check one today and I will notify tomorrow as soon as I receive results..    Please note that this dictation was created using voice recognition software. I have made every reasonable attempt to correct obvious errors, but I expect that there are errors of grammar and possibly content that I did not discover before finalizing the note.    Assessment/Plan:  1. Anemia in chronic kidney disease, on chronic dialysis (HCC)  Patient continues to receive Epogen as part of his dialysis.  - CBC WITHOUT DIFFERENTIAL; Standing    2. AVM (arteriovenous malformation)  These " appear to have been treated during the last hospitalization hopefully are stable.  We will recheck his CBC  - CBC WITHOUT DIFFERENTIAL; Standing    3. Severe aortic stenosis  Patient will need valve replacement in March 4. Hospital discharge follow-up  Questions answered and labs as well as studies reviewed with patient and his wife.

## 2020-02-28 NOTE — TELEPHONE ENCOUNTER
Caller Name: Parmjit Castelan  Call Back Number: [phone] (home)   How would the patient prefer to be contacted with a response: Phone call OK to leave a detailed message    Pt requesting to know hemoglobin lab results.     Please advise.

## 2020-02-28 NOTE — ASSESSMENT & PLAN NOTE
This appointment today is to follow-up from his recent hospitalization of 2/19/2020 and final discharge of 2/26/2020.  Patient was dismissed on 2/17 and presented immediately that same day with dark stool and weakness and his hemoglobin had dropped to 5.5.  He was dismissed yesterday with a hemoglobin of 7.8 and we need to do weekly CBCs to make sure he is holding his hemoglobin and hematocrit.  He is hoping to be able to get his TAVR valve replacement within the next month or so.  He has further testing being done on March 2.  Patient is accompanied today by his wife who listens and on our entire conversation and we reviewed all of his studies from his hospitalization and his recent visit with the cardiothoracic team.

## 2020-03-02 ENCOUNTER — TELEPHONE (OUTPATIENT)
Dept: MEDICAL GROUP | Facility: MEDICAL CENTER | Age: 80
End: 2020-03-02

## 2020-03-02 ENCOUNTER — HOSPITAL ENCOUNTER (OUTPATIENT)
Dept: LAB | Facility: MEDICAL CENTER | Age: 80
End: 2020-03-02
Attending: FAMILY MEDICINE
Payer: MEDICARE

## 2020-03-02 ENCOUNTER — APPOINTMENT (OUTPATIENT)
Dept: URGENT CARE | Facility: MEDICAL CENTER | Age: 80
End: 2020-03-02
Payer: MEDICARE

## 2020-03-02 ENCOUNTER — HOSPITAL ENCOUNTER (EMERGENCY)
Facility: MEDICAL CENTER | Age: 80
DRG: 291 | End: 2020-03-02
Attending: EMERGENCY MEDICINE
Payer: MEDICARE

## 2020-03-02 VITALS
TEMPERATURE: 98.3 F | BODY MASS INDEX: 25.14 KG/M2 | HEART RATE: 88 BPM | DIASTOLIC BLOOD PRESSURE: 76 MMHG | HEIGHT: 68 IN | SYSTOLIC BLOOD PRESSURE: 161 MMHG | OXYGEN SATURATION: 96 % | RESPIRATION RATE: 18 BRPM

## 2020-03-02 DIAGNOSIS — N18.6 ESRD ON DIALYSIS (HCC): ICD-10-CM

## 2020-03-02 DIAGNOSIS — D64.9 ANEMIA, UNSPECIFIED TYPE: ICD-10-CM

## 2020-03-02 DIAGNOSIS — N18.6 ANEMIA IN CHRONIC KIDNEY DISEASE, ON CHRONIC DIALYSIS (HCC): ICD-10-CM

## 2020-03-02 DIAGNOSIS — D63.1 ANEMIA IN CHRONIC KIDNEY DISEASE, ON CHRONIC DIALYSIS (HCC): ICD-10-CM

## 2020-03-02 DIAGNOSIS — Q27.30 AVM (ARTERIOVENOUS MALFORMATION): ICD-10-CM

## 2020-03-02 DIAGNOSIS — Z99.2 ESRD ON DIALYSIS (HCC): ICD-10-CM

## 2020-03-02 DIAGNOSIS — J44.1 ACUTE EXACERBATION OF CHRONIC OBSTRUCTIVE PULMONARY DISEASE (COPD) (HCC): ICD-10-CM

## 2020-03-02 DIAGNOSIS — K25.3 ACUTE GASTRIC ULCER WITHOUT HEMORRHAGE OR PERFORATION: ICD-10-CM

## 2020-03-02 DIAGNOSIS — Z99.2 ANEMIA IN CHRONIC KIDNEY DISEASE, ON CHRONIC DIALYSIS (HCC): ICD-10-CM

## 2020-03-02 LAB
ABO GROUP BLD: NORMAL
ANION GAP SERPL CALC-SCNC: 18 MMOL/L (ref 7–16)
APTT PPP: 28.1 SEC (ref 24.7–36)
BLD GP AB SCN SERPL QL: NORMAL
BUN SERPL-MCNC: 66 MG/DL (ref 8–22)
CALCIUM SERPL-MCNC: 10.6 MG/DL (ref 8.4–10.2)
CHLORIDE SERPL-SCNC: 92 MMOL/L (ref 96–112)
CO2 SERPL-SCNC: 27 MMOL/L (ref 20–33)
CREAT SERPL-MCNC: 10.24 MG/DL (ref 0.5–1.4)
ERYTHROCYTE [DISTWIDTH] IN BLOOD BY AUTOMATED COUNT: 65.4 FL (ref 35.9–50)
ERYTHROCYTE [DISTWIDTH] IN BLOOD BY AUTOMATED COUNT: 66.1 FL (ref 35.9–50)
GLUCOSE SERPL-MCNC: 97 MG/DL (ref 65–99)
HCT VFR BLD AUTO: 22.5 % (ref 42–52)
HCT VFR BLD AUTO: 23.9 % (ref 42–52)
HGB BLD-MCNC: 6.6 G/DL (ref 14–18)
HGB BLD-MCNC: 7.3 G/DL (ref 14–18)
INR PPP: 1.05 (ref 0.87–1.13)
MCH RBC QN AUTO: 30.1 PG (ref 27–33)
MCH RBC QN AUTO: 31.2 PG (ref 27–33)
MCHC RBC AUTO-ENTMCNC: 29.3 G/DL (ref 33.7–35.3)
MCHC RBC AUTO-ENTMCNC: 30.5 G/DL (ref 33.7–35.3)
MCV RBC AUTO: 102.1 FL (ref 81.4–97.8)
MCV RBC AUTO: 102.7 FL (ref 81.4–97.8)
MORPHOLOGY BLD-IMP: NORMAL
PLATELET # BLD AUTO: 175 K/UL (ref 164–446)
PLATELET # BLD AUTO: 210 K/UL (ref 164–446)
PMV BLD AUTO: 10.2 FL (ref 9–12.9)
PMV BLD AUTO: 10.6 FL (ref 9–12.9)
POTASSIUM SERPL-SCNC: 5.3 MMOL/L (ref 3.6–5.5)
PROTHROMBIN TIME: 13.8 SEC (ref 12–14.6)
RBC # BLD AUTO: 2.19 M/UL (ref 4.7–6.1)
RBC # BLD AUTO: 2.34 M/UL (ref 4.7–6.1)
RH BLD: NORMAL
SODIUM SERPL-SCNC: 137 MMOL/L (ref 135–145)
WBC # BLD AUTO: 4.2 K/UL (ref 4.8–10.8)
WBC # BLD AUTO: 4.3 K/UL (ref 4.8–10.8)

## 2020-03-02 PROCEDURE — 85027 COMPLETE CBC AUTOMATED: CPT

## 2020-03-02 PROCEDURE — 80048 BASIC METABOLIC PNL TOTAL CA: CPT

## 2020-03-02 PROCEDURE — 85610 PROTHROMBIN TIME: CPT

## 2020-03-02 PROCEDURE — 85730 THROMBOPLASTIN TIME PARTIAL: CPT

## 2020-03-02 PROCEDURE — 85027 COMPLETE CBC AUTOMATED: CPT | Mod: 91

## 2020-03-02 PROCEDURE — 86850 RBC ANTIBODY SCREEN: CPT

## 2020-03-02 PROCEDURE — 99283 EMERGENCY DEPT VISIT LOW MDM: CPT

## 2020-03-02 PROCEDURE — 86901 BLOOD TYPING SEROLOGIC RH(D): CPT

## 2020-03-02 PROCEDURE — 36415 COLL VENOUS BLD VENIPUNCTURE: CPT

## 2020-03-02 PROCEDURE — 86900 BLOOD TYPING SEROLOGIC ABO: CPT

## 2020-03-02 RX ORDER — DOXYCYCLINE 100 MG/1
100 CAPSULE ORAL 2 TIMES DAILY
Qty: 20 CAP | Refills: 0 | Status: SHIPPED | OUTPATIENT
Start: 2020-03-02 | End: 2020-03-12

## 2020-03-02 RX ORDER — PREDNISONE 20 MG/1
60 TABLET ORAL DAILY
Qty: 15 TAB | Refills: 0 | Status: ON HOLD | OUTPATIENT
Start: 2020-03-02 | End: 2020-03-07

## 2020-03-02 RX ORDER — ALBUTEROL SULFATE 90 UG/1
2 AEROSOL, METERED RESPIRATORY (INHALATION) EVERY 4 HOURS PRN
Qty: 1 INHALER | Refills: 0 | Status: SHIPPED | OUTPATIENT
Start: 2020-03-02 | End: 2020-10-29 | Stop reason: SDUPTHER

## 2020-03-02 NOTE — ED PROVIDER NOTES
"ED Provider Note    CHIEF COMPLAINT  Chief Complaint   Patient presents with   • Abnormal Labs     lab work done today  hemoglobin 6.6 and weakness   Pt had blood transfusions within the month  last dialysis was on friday       HPI  Parmjit Castelan is a 79 y.o. male who presents to the emergency department on referral from his physician for a low hemoglobin.  The patient has had problems with chronic anemia.  He has had an extensive evaluation and is thought to have ongoing anemia related to a GI source.  Patient is on dialysis.  He had a CBC drawn this morning and he was referred to the emergency department for hemoglobin of 6.6.  The patient denies any melena.  He is felt somewhat sluggish.  He has had a cough productive of sputum.  He has a history of COPD and feels like this is acting up.    REVIEW OF SYSTEMS  See [facility] for further details. All other systems are negative.     PAST MEDICAL HISTORY  Past Medical History:   Diagnosis Date   • Anesthesia     \"needs more sedation\" (can sometimes hear MD during procedure)    • Anticoagulation monitoring, special range    • Aortic stenosis     mod AS- concern for low flow severe AS with rEFof 30%   • Arterial leg ulcer ([facility]) 11/8/2018   • Atrial flutter ([facility]) 6/12/2019   • Basal cell carcinoma of left cheek 3/26/2015   • BPH 7/14/2009   • Bronchitis 12/25/2018   • CAD (coronary artery disease) 2/20/2014   • CATARACT     sanjuanita surgery complete   • CHF (congestive heart failure), NYHA class II, chronic, systolic ([facility]) E F30 in setting of atrial flutter 6/13/2019   • Chronic respiratory failure with hypoxia ([facility]) 5/8/2016   • CKD (chronic kidney disease) stage 4, GFR 15-29 ml/min ([facility]) 1/15/2010    end stage renal disease   • COPD (chronic obstructive pulmonary disease) ([facility])     wears 2.5 L o2 sometimes when he sleeps   • Detached retina    • Dialysis     [place],w,f [place]   • EMPHYSEMA    • Glaucoma     Early stage   • Gout    • Heart burn     occas   • Heart murmur "     maria esther lee cardiologist   • Hypertension    • Indigestion     occas   • Kidney cyst    • Leg pain, bilateral 8/10/2015   • On supplemental oxygen therapy    • Peripheral vascular disease (HCC) 8/10/2015   • Pneumonia    • Primary insomnia 3/22/2018   • Proteinuria    • PVD (peripheral vascular disease) (HCC)    • Sleep apnea     O2 PER CANULA  AT NIGHT 2l/m       FAMILY HISTORY  Family History   Problem Relation Age of Onset   • Stroke Mother    • Hypertension Mother    • Lung Disease Father         Emphysema, resp failure   • Genitourinary () Problems Maternal Aunt         hematuria   • Hypertension Brother        SOCIAL HISTORY  Social History     Socioeconomic History   • Marital status:      Spouse name: Not on file   • Number of children: Not on file   • Years of education: Not on file   • Highest education level: Not on file   Occupational History   • Not on file   Social Needs   • Financial resource strain: Not on file   • Food insecurity     Worry: Not on file     Inability: Not on file   • Transportation needs     Medical: Not on file     Non-medical: Not on file   Tobacco Use   • Smoking status: Former Smoker     Packs/day: 1.00     Years: 40.00     Pack years: 40.00     Types: Cigarettes     Last attempt to quit: 2009     Years since quittin.1   • Smokeless tobacco: Never Used   • Tobacco comment: 1 pk a day for 35 yrs, QUIT 2010   Substance and Sexual Activity   • Alcohol use: No     Alcohol/week: 0.0 oz   • Drug use: No   • Sexual activity: Never     Partners: Female     Comment: , two sons, 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   Lifestyle   • Physical activity     Days per week: Not on file     Minutes per session: Not on file   • Stress: Not on file   Relationships   • Social connections     Talks on phone: Not on file     Gets together: Not on file     Attends Synagogue service: Not on file     Active member of club or organization: Not on file     Attends meetings of  clubs or organizations: Not on file     Relationship status: Not on file   • Intimate partner violence     Fear of current or ex partner: Not on file     Emotionally abused: Not on file     Physically abused: Not on file     Forced sexual activity: Not on file   Other Topics Concern   • Not on file   Social History Narrative   • Not on file       SURGICAL HISTORY  Past Surgical History:   Procedure Laterality Date   • PB COLONOSCOPY,FLEX,W/CONTROL, BLEEDING N/A 2/24/2020    Procedure: COLONOSCOPY, WITH ARGON PLASMA COAGULATION;  Surgeon: Juan Matthews M.D.;  Location: SURGERY SAME DAY [facility];  Service: Gastroenterology   • GASTROSCOPY W/PUSH ENTERSCOPY N/A 2/24/2020    Procedure: GASTROSCOPY, WITH PUSH ENTEROSCOPY;  Surgeon: Juan Matthews M.D.;  Location: SURGERY SAME DAY [facility];  Service: Gastroenterology   • PB COLONOSCOPY,DIAGNOSTIC N/A 2/23/2020    Procedure: COLONOSCOPY;  Surgeon: Enrique Child D.O.;  Location: SURGERY [facility];  Service: Gastroenterology   • GASTROSCOPY N/A 2/23/2020    Procedure: GASTROSCOPY;  Surgeon: Enrique Child D.O.;  Location: SURGERY [facility];  Service: Gastroenterology   • CAPSULE ENDOSCOPY ADMINISTRATION N/A 2/20/2020    Procedure: ADMINISTRATION, CAPSULE, FOR CAPSULE ENDOSCOPY;  Surgeon: Gucci Westbrook M.D.;  Location: ENDOSCOPY [facility];  Service: Gastroenterology   • CAPSULE ENDOSCOPY RETRIEVAL  2/20/2020    Procedure: CAPSULE ENDOSCOPY RETRIEVAL;  Surgeon: Gucci Westbrook M.D.;  Location: ENDOSCOPY [facility];  Service: Gastroenterology   • PB SMALL BOWEL ENDOSCOPY,PAST 2ND DUOD  2/19/2020        • COLONOSCOPY - ENDO  2/19/2020        • PB COLONOSCOPY,DIAGNOSTIC  2/19/2020    Procedure: COLONOSCOPY;  Surgeon: Gucci Westbrook M.D.;  Location: SURGERY [facility];  Service: Gastroenterology   • GASTROSCOPY  2/19/2020    Procedure: GASTROSCOPY;  Surgeon: Gucci Westbrook M.D.;  Location: [facility];   Service: Gastroenterology   • GASTROSCOPY-ENDO  2/7/2020    Procedure: GASTROSCOPY;  Surgeon: Jong Carrasquillo M.D.;  Location: ENDOSCOPY [facility];  Service: Gastroenterology   • STENT PLACEMENT  01/26/2020    lda   • AV FISTULA CREATION Right 8/6/2019    Procedure: CREATION, AV FISTULA -  RIGHT ARM  ,  and Ligation of Left Arm Fistula;  Surgeon: Sera Peters M.D.;  Location: SURGERY [facility];  Service: General   • CATH PLACEMENT Right 8/6/2019    Procedure: INSERTION, CATHETER - PERMA ,;  Surgeon: Sera Peters M.D.;  Location: SURGERY [facility];  Service: General   • JUSTIN  6/13/2019    Procedure: ECHOCARDIOGRAM, TRANSESOPHAGEAL;  Surgeon: Harvinder Hoang M.D.;  Location: SURGERY [facility];  Service: Cardiac   • CARDIOVERSION  6/13/2019    Procedure: CARDIOVERSION;  Surgeon: Harvinder Hoang M.D.;  Location: [facility];  Service: Cardiac   • AV FISTULA CREATION Right 2/21/2019    Procedure: AV FISTULA CREATION - ARM;  Surgeon: David Cason M.D.;  Location: SURGERY [facility];  Service: General   • FEMORAL ENDARTERECTOMY Left 1/25/2019    Procedure: FEMORAL ENDARTERECTOMY;  Surgeon: David Cason M.D.;  Location: SURGERY [facility];  Service: General   • FEMORAL POPLITEAL BYPASS Left 1/25/2019    Procedure: LEFT FEMORAL POPLITEAL POLYTETRAFLUOROETHYLENE ePTFE(PROPATEN VASCULAR GRAFT) BYPASS;  Surgeon: David Cason M.D.;  Location: SURGERY [facility];  Service: General   • ANGIOGRAM Left 1/25/2019    Procedure: LEFT LEG ANGIOGRAM;  Surgeon: David Cason M.D.;  Location: SURGERY [facility];  Service: General   • ENDOSCOPY PROCEDURE  3/21/2018    Procedure: ENDOSCOPY PROCEDURE/UPPER;  Surgeon: Enrique Child D.O.;  Location: [facility];  Service: Gastroenterology   • COLONOSCOPY - ENDO  8/15/2016    Procedure: COLONOSCOPY - ENDO;  Surgeon: Mane Whatley M.D.;  Location: ENDOSCOPY [facility];   Service:    • GASTROSCOPY WITH BIOPSY  8/13/2016    Procedure: GASTROSCOPY WITH BIOPSY;  Surgeon: Jorge Leavitt M.D.;  Location: [facility];  Service:    • RECOVERY  12/23/2015    Procedure: VASCULAR CASE-BLANKA-LEFT ARM FISTULOGRAM WITH ANGIOPLASTY;  Surgeon: Recoveryonbhavani Surgery;  Location: SURGERY [facility];  Service:    • RECOVERY  3/24/2015    Performed by Recovery[facility] Surgery at SURGERY PRE[facility] PROC [facility]   • RECOVERY  7/29/2014    Performed by Ir-Recovery Surgery at SURGERY SAME DAY [facility] ORS   • RECOVERY  3/24/2014    Performed by [facility] Surgery at SURGERY [facility]   • RECOVERY  12/17/2013    Performed by Ir-Recovery Surgery at SURGERY SAME DAY [facility]   • RECOVERY  7/2/2013    Performed by [facility]Recovery Surgery at SURGERY SAME DAY [facility] ORS   • AV FISTULA THROMBOLYSIS  7/2/2013    Performed by David Cason M.D. at SURGERY [facility]   • RECOVERY  1/29/2013    Performed by [facility]Recovery Surgery at SURGERY SAME DAY [facility] ORS   • RECOVERY  7/23/2012    Performed by SURGERY, IR-RECOVERY at SURGERY SAME DAY [facility] ORS   • VITRECTOMY POSTERIOR  10/11/2011    Performed by NAHUM GE at SURGERY SAME DAY [facility] ORS   • RECOVERY  8/12/2011    Performed by SURGERY, IR-RECOVERY at SURGERY SAME DAY [facility] ORS   • VITRECTOMY POSTERIOR  1/18/2011    Performed by NAHUM GE at SURGERY SAME DAY [facility] ORS   • SCLERAL BUCKLING  1/18/2011    Performed by NAHUM GE at SURGERY SAME DAY [facility] ORS   • AV FISTULOGRAM  9/17/2010    Performed by DAVID CASON at SURGERY [facility]   • ANGIOPLASTY BALLOON  9/17/2010    Performed by DAVID CASON at SURGERY [facility] ORS   • AV FISTULOGRAM  7/23/2010    Performed by DAVID CASON at SURGERY [facility]   • ANGIOPLASTY BALLOON  7/23/2010    Performed by DAVID CASON at SURGERY [facility]   • AV FISTULA REVISION  2/19/2010    Performed by MAMIE  "WILLIE LANDRUM at SURGERY [facility]S   • AV FISTULA CREATION  2/12/2010    Performed by ALESHIA MOSCOSO at SURGERY [facility] ORS   • CATARACT PHACO WITH IOL  4/8/08    Performed by STACEY PHILLIPS at SURGERY SAME DAY [facility] ORS   • OTHER ORTHOPEDIC SURGERY  1998    right toe for facititis       CURRENT MEDICATIONS  Home Medications     Reviewed by Mee Izaguirre (Pharmacy Tech) on 03/02/20 at 1228  Med List Status: Complete   Medication Last Dose Status   albuterol 108 (90 Base) MCG/ACT Aero Soln inhalation aerosol 3/1/2020 Active   B Complex-C-Folic Acid (DIALYVITE TABLET) Tab 3/1/2020 Active   Calcium Acetate, Phos Binder, 667 MG Cap 3/1/2020 Active   clopidogrel (PLAVIX) 75 MG Tab 3/1/2020 Active   fluticasone (FLONASE) 50 MCG/ACT nasal spray 3/1/2020 Active   Fluticasone-Umeclidin-Vilant (TRELEGY ELLIPTA) 100-62.5-25 MCG/INH AEROSOL POWDER, BREATH ACTIVATED 3/1/2020 Active   levalbuterol (XOPENEX) 1.25 MG/3ML Nebu Soln 3/1/2020 Active   montelukast (SINGULAIR) 10 MG Tab 3/1/2020 Active   omeprazole (PRILOSEC) 40 MG delayed-release capsule 3/1/2020 Active   ROPINIRole (REQUIP) 0.5 MG Tab 3/1/2020 Active   rosuvastatin (CRESTOR) 40 MG tablet 3/1/2020 Active   tamsulosin (FLOMAX) 0.4 MG capsule 3/1/2020 Active   torsemide (DEMADEX) 10 MG tablet 3/1/2020 Active   traZODone (DESYREL) 150 MG Tab 3/1/2020 Active                ALLERGIES  No Known Allergies    PHYSICAL EXAM  VITAL SIGNS: BP (!) 161/76   Pulse 88   Temp 36.8 °C (98.3 °F) (Temporal)   Resp 18   Ht 1.727 m (5' 8\")   SpO2 96%   BMI 25.14 kg/m²   Constitutional: Well developed, chronically ill-appearing, no acute distress, Non-toxic appearance.   HENT: Normocephalic, Atraumatic, Bilateral external ears normal, Oropharynx moist, No oral exudates, Nose normal.   Eyes: PERRL, EOMI, Conjunctiva normal, No discharge.   Neck: Normal range of motion, No tenderness, Supple, No stridor.   Lymphatic: No lymphadenopathy noted.   Cardiovascular: " Normal heart rate, Normal rhythm, No murmurs, No rubs, No gallops.   Thorax & Lungs: Normal breath sounds, No respiratory distress, No wheezing, No chest tenderness.  Productive cough noted.  Abdomen: Soft, nontender, nondistended, no organomegaly.  Skin: Warm, Dry, No erythema, No rash.   Back: No tenderness, No CVA tenderness.   Extremities: Intact distal pulses, No edema, No tenderness, No cyanosis, No clubbing.  Left AV fistula noted.  Palpable thrill.  Neurologic: Alert & oriented x 3, Normal motor function, Normal sensory function, No focal deficits noted.       RADIOLOGY/PROCEDURES  No orders to display     Results for orders placed or performed during the hospital encounter of 03/02/20   CBC WITHOUT DIFFERENTIAL   Result Value Ref Range    WBC 4.3 (L) 4.8 - 10.8 K/uL    RBC 2.34 (L) 4.70 - 6.10 M/uL    Hemoglobin 7.3 (L) 14.0 - 18.0 g/dL    Hematocrit 23.9 (L) 42.0 - 52.0 %    .1 (H) 81.4 - 97.8 fL    MCH 31.2 27.0 - 33.0 pg    MCHC 30.5 (L) 33.7 - 35.3 g/dL    RDW 65.4 (H) 35.9 - 50.0 fL    Platelet Count 210 164 - 446 K/uL    MPV 10.6 9.0 - 12.9 fL   BASIC METABOLIC PANEL   Result Value Ref Range    Sodium 137 135 - 145 mmol/L    Potassium 5.3 3.6 - 5.5 mmol/L    Chloride 92 (L) 96 - 112 mmol/L    Co2 27 20 - 33 mmol/L    Glucose 97 65 - 99 mg/dL    Bun 66 (H) 8 - 22 mg/dL    Creatinine 10.24 (HH) 0.50 - 1.40 mg/dL    Calcium 10.6 (H) 8.4 - 10.2 mg/dL    Anion Gap 18.0 (H) 7.0 - 16.0   COD (ADULT)   Result Value Ref Range    ABO Grouping Only O     Rh Grouping Only POS     Antibody Screen-Cod NEG    PROTHROMBIN TIME (INR)   Result Value Ref Range    PT 13.8 12.0 - 14.6 sec    INR 1.05 0.87 - 1.13   APTT   Result Value Ref Range    APTT 28.1 24.7 - 36.0 sec   ESTIMATED GFR   Result Value Ref Range    GFR If  6 (A) >60 mL/min/1.73 m 2    GFR If Non African American 5 (A) >60 mL/min/1.73 m 2         COURSE & MEDICAL DECISION MAKING  Pertinent Labs & Imaging studies reviewed. (See chart  for details)    Patient presents today on referral from his physician for anemia.  Hemoglobin was 6.6.  This is rechecked in the emergency department and we have a result of 7.3.  He has other laboratory findings consistent with end-stage renal disease.  He is due for dialysis today at 3 PM.  I anticipate that his hemoglobin actually go up after dialysis.  At this point I feel that his hemoglobin is relatively stable and does not require blood transfusion at this time.  He has had an ongoing cough.  He is not hypoxic.  He has no crackles on exam.  I feel he likely is having a mild COPD exacerbation.  The patient will be treated with doxycycline, prednisone and is given a refill of his albuterol.  Patient is discharged in stable condition.  He will proceed from the emergency department to dialysis.    The patient will return for new or worsening symptoms and is stable at the time of discharge.    The patient is referred to a primary physician for blood pressure management, diabetic screening, and for all other preventative health concerns.    DISPOSITION:  Patient will be discharged home in stable condition.    FOLLOW UP:  Martha Light M.D.  [address]  [address]  [place] [id number] [phone]    Schedule an appointment as soon as possible for a visit       [facility], Emergency Dept  [address]  [place] [id number] [phone]    If symptoms worsen      OUTPATIENT MEDICATIONS:  Discharge Medication List as of 3/2/2020  1:16 PM      START taking these medications    Details   predniSONE (DELTASONE) 20 MG Tab Take 3 Tabs by mouth every day for 5 days., Disp-15 Tab, R-0, Normal      !! albuterol 108 (90 Base) MCG/ACT Aero Soln inhalation aerosol Inhale 2 Puffs by mouth every four hours as needed for Shortness of Breath., Disp-1 Inhaler, R-0, Normal      doxycycline (MONODOX) 100 MG capsule Take 1 Cap by mouth 2 times a day for 10 days., Disp-20 Cap, R-0, Normal        !! - Potential duplicate medications found. Please discuss with provider.        FINAL IMPRESSION  1. Anemia, unspecified type    2. Acute exacerbation of chronic obstructive pulmonary disease (COPD) (HCC)    3. ESRD on dialysis (formerly [facility])            Electronically signed by: Jose Eduardo Peters M.D., 3/2/2020 1:46 PM

## 2020-03-02 NOTE — TELEPHONE ENCOUNTER
1. Caller Name: Parmjit Castelan                          Call Back Number: [phone] (home)        How would the patient prefer to be contacted with a response: Phone call OK to leave a detailed message    Patient came in today stating that he had lab work done this morning and he is not feeling well. They would like you to call the lab and see what is levels are at. They stated they needed the lab order to stay STAT so we could find out ASAP.     Wife is worried about is levels and he may need a transfusion.

## 2020-03-02 NOTE — ED NOTES
Parmjit from Lab called with critical result of Creatine 10.24 at 1230. Critical lab result read back to Parmjit.   Dr. Peters notified of critical lab result at 1230.  Critical lab result read back by Dr. Peters.

## 2020-03-02 NOTE — TELEPHONE ENCOUNTER
Phone Number Called: [facility]    Call outcome: Spoke with      Message: Requested labs to be done stat per Dr. OSEI. He will test them stat and requested for the standing orders to always be done stat.

## 2020-03-02 NOTE — TELEPHONE ENCOUNTER
Sent a new order with stat results requested on the standing orders.  We will await those results today

## 2020-03-02 NOTE — ED NOTES
IV d/c'd, instructions went over with pt and he verbalized understanding, no questions or concerns at time of d/c.

## 2020-03-03 ENCOUNTER — HOSPITAL ENCOUNTER (OUTPATIENT)
Dept: HOSPITAL 8 - CVU | Age: 80
Discharge: HOME | End: 2020-03-03
Attending: INTERNAL MEDICINE
Payer: MEDICARE

## 2020-03-03 DIAGNOSIS — N40.0: ICD-10-CM

## 2020-03-03 DIAGNOSIS — J90: ICD-10-CM

## 2020-03-03 DIAGNOSIS — I70.0: ICD-10-CM

## 2020-03-03 DIAGNOSIS — I25.10: ICD-10-CM

## 2020-03-03 DIAGNOSIS — J98.19: ICD-10-CM

## 2020-03-03 DIAGNOSIS — I65.23: Primary | ICD-10-CM

## 2020-03-03 DIAGNOSIS — N18.9: ICD-10-CM

## 2020-03-03 DIAGNOSIS — K76.89: ICD-10-CM

## 2020-03-03 DIAGNOSIS — I08.0: ICD-10-CM

## 2020-03-03 DIAGNOSIS — Z87.891: ICD-10-CM

## 2020-03-03 DIAGNOSIS — N28.1: ICD-10-CM

## 2020-03-03 DIAGNOSIS — J98.11: ICD-10-CM

## 2020-03-03 PROCEDURE — 93356 MYOCRD STRAIN IMG SPCKL TRCK: CPT

## 2020-03-03 PROCEDURE — 94060 EVALUATION OF WHEEZING: CPT

## 2020-03-03 PROCEDURE — 94729 DIFFUSING CAPACITY: CPT

## 2020-03-03 PROCEDURE — 71275 CT ANGIOGRAPHY CHEST: CPT

## 2020-03-03 PROCEDURE — 93880 EXTRACRANIAL BILAT STUDY: CPT

## 2020-03-03 PROCEDURE — 94726 PLETHYSMOGRAPHY LUNG VOLUMES: CPT

## 2020-03-03 PROCEDURE — 93306 TTE W/DOPPLER COMPLETE: CPT

## 2020-03-03 PROCEDURE — 74174 CTA ABD&PLVS W/CONTRAST: CPT

## 2020-03-04 ENCOUNTER — HOSPITAL ENCOUNTER (OUTPATIENT)
Facility: MEDICAL CENTER | Age: 80
End: 2020-03-04
Attending: FAMILY MEDICINE
Payer: MEDICARE

## 2020-03-04 DIAGNOSIS — K25.3 ACUTE GASTRIC ULCER WITHOUT HEMORRHAGE OR PERFORATION: ICD-10-CM

## 2020-03-04 LAB
ANISOCYTOSIS BLD QL SMEAR: ABNORMAL
BASOPHILS # BLD AUTO: 0 % (ref 0–1.8)
BASOPHILS # BLD: 0 K/UL (ref 0–0.12)
BURR CELLS BLD QL SMEAR: NORMAL
COMMENT 1642: NORMAL
EOSINOPHIL # BLD AUTO: 0 K/UL (ref 0–0.51)
EOSINOPHIL NFR BLD: 0 % (ref 0–6.9)
ERYTHROCYTE [DISTWIDTH] IN BLOOD BY AUTOMATED COUNT: 69.6 FL (ref 35.9–50)
HCT VFR BLD AUTO: 23.2 % (ref 42–52)
HGB BLD-MCNC: 6.7 G/DL (ref 14–18)
IMM GRANULOCYTES # BLD AUTO: 0.01 K/UL (ref 0–0.11)
IMM GRANULOCYTES NFR BLD AUTO: 0.2 % (ref 0–0.9)
LYMPHOCYTES # BLD AUTO: 0.18 K/UL (ref 1–4.8)
LYMPHOCYTES NFR BLD: 3.4 % (ref 22–41)
MCH RBC QN AUTO: 30.6 PG (ref 27–33)
MCHC RBC AUTO-ENTMCNC: 28.9 G/DL (ref 33.7–35.3)
MCV RBC AUTO: 105.9 FL (ref 81.4–97.8)
MICROCYTES BLD QL SMEAR: ABNORMAL
MONOCYTES # BLD AUTO: 0.16 K/UL (ref 0–0.85)
MONOCYTES NFR BLD AUTO: 3 % (ref 0–13.4)
MORPHOLOGY BLD-IMP: NORMAL
NEUTROPHILS # BLD AUTO: 4.97 K/UL (ref 1.82–7.42)
NEUTROPHILS NFR BLD: 93.4 % (ref 44–72)
NRBC # BLD AUTO: 0 K/UL
NRBC BLD-RTO: 0 /100 WBC
PLATELET # BLD AUTO: 211 K/UL (ref 164–446)
PLATELET BLD QL SMEAR: NORMAL
PMV BLD AUTO: 10.4 FL (ref 9–12.9)
POIKILOCYTOSIS BLD QL SMEAR: NORMAL
RBC # BLD AUTO: 2.19 M/UL (ref 4.7–6.1)
RBC BLD AUTO: PRESENT
WBC # BLD AUTO: 5.3 K/UL (ref 4.8–10.8)

## 2020-03-04 PROCEDURE — 85025 COMPLETE CBC W/AUTO DIFF WBC: CPT

## 2020-03-05 ENCOUNTER — HOSPITAL ENCOUNTER (INPATIENT)
Facility: MEDICAL CENTER | Age: 80
LOS: 2 days | DRG: 291 | End: 2020-03-07
Attending: EMERGENCY MEDICINE | Admitting: INTERNAL MEDICINE
Payer: MEDICARE

## 2020-03-05 ENCOUNTER — APPOINTMENT (OUTPATIENT)
Dept: RADIOLOGY | Facility: MEDICAL CENTER | Age: 80
DRG: 291 | End: 2020-03-05
Attending: EMERGENCY MEDICINE
Payer: MEDICARE

## 2020-03-05 ENCOUNTER — APPOINTMENT (OUTPATIENT)
Dept: RADIOLOGY | Facility: MEDICAL CENTER | Age: 80
DRG: 291 | End: 2020-03-05
Attending: INTERNAL MEDICINE
Payer: MEDICARE

## 2020-03-05 ENCOUNTER — APPOINTMENT (OUTPATIENT)
Dept: CARDIOLOGY | Facility: MEDICAL CENTER | Age: 80
DRG: 291 | End: 2020-03-05
Attending: EMERGENCY MEDICINE
Payer: MEDICARE

## 2020-03-05 DIAGNOSIS — R06.00 DYSPNEA, UNSPECIFIED TYPE: ICD-10-CM

## 2020-03-05 DIAGNOSIS — R53.1 WEAKNESS: ICD-10-CM

## 2020-03-05 PROBLEM — M54.9 PAIN, UPPER BACK: Status: ACTIVE | Noted: 2020-03-05

## 2020-03-05 PROBLEM — J96.21 ACUTE ON CHRONIC RESPIRATORY FAILURE WITH HYPOXEMIA (HCC): Status: ACTIVE | Noted: 2020-03-05

## 2020-03-05 PROBLEM — J90 PLEURAL EFFUSION: Status: ACTIVE | Noted: 2020-03-05

## 2020-03-05 LAB
ABO GROUP BLD: NORMAL
ALBUMIN SERPL BCP-MCNC: 3.6 G/DL (ref 3.2–4.9)
ALBUMIN/GLOB SERPL: 1.2 G/DL
ALP SERPL-CCNC: 121 U/L (ref 30–99)
ALT SERPL-CCNC: 16 U/L (ref 2–50)
AMYLASE FLD-CCNC: 88 U/L
ANION GAP SERPL CALC-SCNC: 17 MMOL/L (ref 7–16)
APPEARANCE FLD: NORMAL
AST SERPL-CCNC: 17 U/L (ref 12–45)
BASOPHILS # BLD AUTO: 0 % (ref 0–1.8)
BASOPHILS # BLD: 0 K/UL (ref 0–0.12)
BILIRUB SERPL-MCNC: 0.4 MG/DL (ref 0.1–1.5)
BLD GP AB SCN SERPL QL: NORMAL
BODY FLD TYPE: NORMAL
BUN SERPL-MCNC: 50 MG/DL (ref 8–22)
CALCIUM SERPL-MCNC: 10.1 MG/DL (ref 8.4–10.2)
CHLORIDE SERPL-SCNC: 97 MMOL/L (ref 96–112)
CO2 SERPL-SCNC: 25 MMOL/L (ref 20–33)
COLOR FLD: NORMAL
CREAT SERPL-MCNC: 6.94 MG/DL (ref 0.5–1.4)
CSF COMMENTS 1658: NORMAL
CYTOLOGY REG CYTOL: NORMAL
EOSINOPHIL # BLD AUTO: 0 K/UL (ref 0–0.51)
EOSINOPHIL NFR BLD: 0 % (ref 0–6.9)
EOSINOPHIL NFR FLD: 16 %
ERYTHROCYTE [DISTWIDTH] IN BLOOD BY AUTOMATED COUNT: 68.1 FL (ref 35.9–50)
GLOBULIN SER CALC-MCNC: 3 G/DL (ref 1.9–3.5)
GLUCOSE FLD-MCNC: 94 MG/DL
GLUCOSE SERPL-MCNC: 101 MG/DL (ref 65–99)
HCT VFR BLD AUTO: 25.6 % (ref 42–52)
HGB BLD-MCNC: 7.5 G/DL (ref 14–18)
IMM GRANULOCYTES # BLD AUTO: 0.04 K/UL (ref 0–0.11)
IMM GRANULOCYTES NFR BLD AUTO: 0.6 % (ref 0–0.9)
INR PPP: 1.09 (ref 0.87–1.13)
LDH FLD L TO P-CCNC: 205 U/L
LYMPHOCYTES # BLD AUTO: 0.26 K/UL (ref 1–4.8)
LYMPHOCYTES NFR BLD: 3.7 % (ref 22–41)
LYMPHOCYTES NFR FLD: 37 %
MCH RBC QN AUTO: 30.7 PG (ref 27–33)
MCHC RBC AUTO-ENTMCNC: 29.3 G/DL (ref 33.7–35.3)
MCV RBC AUTO: 104.9 FL (ref 81.4–97.8)
MESOTHL CELL NFR FLD: 5 %
MONOCYTES # BLD AUTO: 0.28 K/UL (ref 0–0.85)
MONOCYTES NFR BLD AUTO: 3.9 % (ref 0–13.4)
MONONUC CELLS NFR FLD: 4 %
NEUTROPHILS # BLD AUTO: 6.54 K/UL (ref 1.82–7.42)
NEUTROPHILS NFR BLD: 91.8 % (ref 44–72)
NEUTROPHILS NFR FLD: 22 %
NRBC # BLD AUTO: 0 K/UL
NRBC BLD-RTO: 0 /100 WBC
NT-PROBNP SERPL IA-MCNC: ABNORMAL PG/ML (ref 0–125)
PH FLD: 8 [PH]
PLATELET # BLD AUTO: 205 K/UL (ref 164–446)
PMV BLD AUTO: 9.8 FL (ref 9–12.9)
POTASSIUM SERPL-SCNC: 4.9 MMOL/L (ref 3.6–5.5)
PROT FLD-MCNC: 2.8 G/DL
PROT SERPL-MCNC: 6.6 G/DL (ref 6–8.2)
PROTHROMBIN TIME: 14.2 SEC (ref 12–14.6)
RBC # BLD AUTO: 2.44 M/UL (ref 4.7–6.1)
RBC # FLD: NORMAL CELLS/UL
RH BLD: NORMAL
SODIUM SERPL-SCNC: 139 MMOL/L (ref 135–145)
WBC # BLD AUTO: 7.1 K/UL (ref 4.8–10.8)
WBC # FLD: 234 CELLS/UL
WBC OTHER NFR FLD: 16 %

## 2020-03-05 PROCEDURE — 83880 ASSAY OF NATRIURETIC PEPTIDE: CPT

## 2020-03-05 PROCEDURE — 87206 SMEAR FLUORESCENT/ACID STAI: CPT

## 2020-03-05 PROCEDURE — 87070 CULTURE OTHR SPECIMN AEROBIC: CPT

## 2020-03-05 PROCEDURE — 82150 ASSAY OF AMYLASE: CPT

## 2020-03-05 PROCEDURE — 83615 LACTATE (LD) (LDH) ENZYME: CPT

## 2020-03-05 PROCEDURE — 89051 BODY FLUID CELL COUNT: CPT

## 2020-03-05 PROCEDURE — 85025 COMPLETE CBC W/AUTO DIFF WBC: CPT

## 2020-03-05 PROCEDURE — 90935 HEMODIALYSIS ONE EVALUATION: CPT

## 2020-03-05 PROCEDURE — C1729 CATH, DRAINAGE: HCPCS

## 2020-03-05 PROCEDURE — 94640 AIRWAY INHALATION TREATMENT: CPT

## 2020-03-05 PROCEDURE — 770020 HCHG ROOM/CARE - TELE (206)

## 2020-03-05 PROCEDURE — A9270 NON-COVERED ITEM OR SERVICE: HCPCS | Performed by: INTERNAL MEDICINE

## 2020-03-05 PROCEDURE — 86900 BLOOD TYPING SEROLOGIC ABO: CPT

## 2020-03-05 PROCEDURE — 700101 HCHG RX REV CODE 250: Performed by: INTERNAL MEDICINE

## 2020-03-05 PROCEDURE — 99222 1ST HOSP IP/OBS MODERATE 55: CPT | Performed by: INTERNAL MEDICINE

## 2020-03-05 PROCEDURE — 87015 SPECIMEN INFECT AGNT CONCNTJ: CPT | Mod: 91

## 2020-03-05 PROCEDURE — 86850 RBC ANTIBODY SCREEN: CPT

## 2020-03-05 PROCEDURE — 86901 BLOOD TYPING SEROLOGIC RH(D): CPT

## 2020-03-05 PROCEDURE — 94760 N-INVAS EAR/PLS OXIMETRY 1: CPT

## 2020-03-05 PROCEDURE — 71045 X-RAY EXAM CHEST 1 VIEW: CPT

## 2020-03-05 PROCEDURE — 82945 GLUCOSE OTHER FLUID: CPT

## 2020-03-05 PROCEDURE — 88112 CYTOPATH CELL ENHANCE TECH: CPT

## 2020-03-05 PROCEDURE — 93306 TTE W/DOPPLER COMPLETE: CPT

## 2020-03-05 PROCEDURE — 99223 1ST HOSP IP/OBS HIGH 75: CPT | Performed by: INTERNAL MEDICINE

## 2020-03-05 PROCEDURE — 87102 FUNGUS ISOLATION CULTURE: CPT

## 2020-03-05 PROCEDURE — 99223 1ST HOSP IP/OBS HIGH 75: CPT | Mod: AI | Performed by: INTERNAL MEDICINE

## 2020-03-05 PROCEDURE — 84157 ASSAY OF PROTEIN OTHER: CPT

## 2020-03-05 PROCEDURE — 99285 EMERGENCY DEPT VISIT HI MDM: CPT

## 2020-03-05 PROCEDURE — 700102 HCHG RX REV CODE 250 W/ 637 OVERRIDE(OP): Performed by: INTERNAL MEDICINE

## 2020-03-05 PROCEDURE — 87116 MYCOBACTERIA CULTURE: CPT

## 2020-03-05 PROCEDURE — 85610 PROTHROMBIN TIME: CPT

## 2020-03-05 PROCEDURE — 88305 TISSUE EXAM BY PATHOLOGIST: CPT

## 2020-03-05 PROCEDURE — 83986 ASSAY PH BODY FLUID NOS: CPT

## 2020-03-05 PROCEDURE — 87205 SMEAR GRAM STAIN: CPT

## 2020-03-05 PROCEDURE — 80053 COMPREHEN METABOLIC PANEL: CPT

## 2020-03-05 RX ORDER — HEPARIN SODIUM 5000 [USP'U]/ML
5000 INJECTION, SOLUTION INTRAVENOUS; SUBCUTANEOUS EVERY 8 HOURS
Status: DISCONTINUED | OUTPATIENT
Start: 2020-03-05 | End: 2020-03-07 | Stop reason: HOSPADM

## 2020-03-05 RX ORDER — FLUTICASONE PROPIONATE 44 UG/1
2 AEROSOL, METERED RESPIRATORY (INHALATION)
Status: DISCONTINUED | OUTPATIENT
Start: 2020-03-06 | End: 2020-03-06

## 2020-03-05 RX ORDER — CLOPIDOGREL BISULFATE 75 MG/1
75 TABLET ORAL EVERY EVENING
Status: DISCONTINUED | OUTPATIENT
Start: 2020-03-05 | End: 2020-03-07 | Stop reason: HOSPADM

## 2020-03-05 RX ORDER — TORSEMIDE 10 MG/1
30 TABLET ORAL
Status: DISCONTINUED | OUTPATIENT
Start: 2020-03-05 | End: 2020-03-07 | Stop reason: HOSPADM

## 2020-03-05 RX ORDER — DOXYCYCLINE 100 MG/1
100 TABLET ORAL EVERY 12 HOURS
Status: COMPLETED | OUTPATIENT
Start: 2020-03-05 | End: 2020-03-06

## 2020-03-05 RX ORDER — ROPINIROLE 0.5 MG/1
1 TABLET, FILM COATED ORAL
Status: DISCONTINUED | OUTPATIENT
Start: 2020-03-05 | End: 2020-03-07 | Stop reason: HOSPADM

## 2020-03-05 RX ORDER — TAMSULOSIN HYDROCHLORIDE 0.4 MG/1
0.8 CAPSULE ORAL DAILY
Status: DISCONTINUED | OUTPATIENT
Start: 2020-03-05 | End: 2020-03-07 | Stop reason: HOSPADM

## 2020-03-05 RX ORDER — ACETAMINOPHEN 325 MG/1
650 TABLET ORAL EVERY 6 HOURS PRN
Status: DISCONTINUED | OUTPATIENT
Start: 2020-03-05 | End: 2020-03-07 | Stop reason: HOSPADM

## 2020-03-05 RX ORDER — TRAZODONE HYDROCHLORIDE 50 MG/1
150-300 TABLET ORAL
Status: DISCONTINUED | OUTPATIENT
Start: 2020-03-05 | End: 2020-03-07 | Stop reason: HOSPADM

## 2020-03-05 RX ORDER — ONDANSETRON 2 MG/ML
4 INJECTION INTRAMUSCULAR; INTRAVENOUS EVERY 4 HOURS PRN
Status: DISCONTINUED | OUTPATIENT
Start: 2020-03-05 | End: 2020-03-07 | Stop reason: HOSPADM

## 2020-03-05 RX ORDER — ONDANSETRON 4 MG/1
4 TABLET, ORALLY DISINTEGRATING ORAL EVERY 4 HOURS PRN
Status: DISCONTINUED | OUTPATIENT
Start: 2020-03-05 | End: 2020-03-07 | Stop reason: HOSPADM

## 2020-03-05 RX ORDER — FLUTICASONE PROPIONATE 50 MCG
1-2 SPRAY, SUSPENSION (ML) NASAL DAILY
Status: DISCONTINUED | OUTPATIENT
Start: 2020-03-06 | End: 2020-03-07 | Stop reason: HOSPADM

## 2020-03-05 RX ORDER — ROSUVASTATIN CALCIUM 10 MG/1
40 TABLET, COATED ORAL EVERY EVENING
Status: DISCONTINUED | OUTPATIENT
Start: 2020-03-05 | End: 2020-03-07 | Stop reason: HOSPADM

## 2020-03-05 RX ORDER — LEVALBUTEROL INHALATION SOLUTION 1.25 MG/3ML
1.25 SOLUTION RESPIRATORY (INHALATION) EVERY 4 HOURS PRN
Status: DISCONTINUED | OUTPATIENT
Start: 2020-03-05 | End: 2020-03-07 | Stop reason: HOSPADM

## 2020-03-05 RX ORDER — MONTELUKAST SODIUM 10 MG/1
10 TABLET ORAL EVERY EVENING
Status: DISCONTINUED | OUTPATIENT
Start: 2020-03-05 | End: 2020-03-07 | Stop reason: HOSPADM

## 2020-03-05 RX ORDER — OMEPRAZOLE 20 MG/1
40 CAPSULE, DELAYED RELEASE ORAL DAILY
Status: DISCONTINUED | OUTPATIENT
Start: 2020-03-06 | End: 2020-03-07 | Stop reason: HOSPADM

## 2020-03-05 RX ADMIN — CALCIUM ACETATE 1334 MG: 667 CAPSULE ORAL at 17:49

## 2020-03-05 RX ADMIN — DOXYCYCLINE 100 MG: 100 TABLET, FILM COATED ORAL at 17:50

## 2020-03-05 RX ADMIN — CLOPIDOGREL BISULFATE 75 MG: 75 TABLET ORAL at 17:50

## 2020-03-05 RX ADMIN — TRAZODONE HYDROCHLORIDE 300 MG: 50 TABLET ORAL at 21:25

## 2020-03-05 RX ADMIN — TORSEMIDE 30 MG: 10 TABLET ORAL at 17:50

## 2020-03-05 RX ADMIN — ROPINIROLE HYDROCHLORIDE 1 MG: 0.5 TABLET, FILM COATED ORAL at 21:25

## 2020-03-05 RX ADMIN — MONTELUKAST 10 MG: 10 TABLET, FILM COATED ORAL at 17:50

## 2020-03-05 RX ADMIN — ROSUVASTATIN CALCIUM 40 MG: 10 TABLET, COATED ORAL at 17:49

## 2020-03-05 RX ADMIN — LEVALBUTEROL HYDROCHLORIDE 1.25 MG: 1.25 SOLUTION RESPIRATORY (INHALATION) at 17:32

## 2020-03-05 RX ADMIN — NEPHROCAP 1 CAPSULE: 1 CAP ORAL at 17:49

## 2020-03-05 RX ADMIN — TAMSULOSIN HYDROCHLORIDE 0.8 MG: 0.4 CAPSULE ORAL at 17:49

## 2020-03-05 ASSESSMENT — ENCOUNTER SYMPTOMS
EYE DISCHARGE: 0
SHORTNESS OF BREATH: 1
DIZZINESS: 0
SORE THROAT: 0
NERVOUS/ANXIOUS: 1
NAUSEA: 0
ABDOMINAL PAIN: 0
HEADACHES: 0
FEVER: 0
CHILLS: 0
WEAKNESS: 0
PALPITATIONS: 0
CONSTIPATION: 0
VOMITING: 0
EYE REDNESS: 0
ORTHOPNEA: 1
DIARRHEA: 0

## 2020-03-05 ASSESSMENT — COGNITIVE AND FUNCTIONAL STATUS - GENERAL
MOVING FROM LYING ON BACK TO SITTING ON SIDE OF FLAT BED: A LITTLE
CLIMB 3 TO 5 STEPS WITH RAILING: A LOT
DAILY ACTIVITIY SCORE: 21
MOBILITY SCORE: 18
DRESSING REGULAR UPPER BODY CLOTHING: A LITTLE
HELP NEEDED FOR BATHING: A LITTLE
WALKING IN HOSPITAL ROOM: A LITTLE
MOVING TO AND FROM BED TO CHAIR: A LITTLE
SUGGESTED CMS G CODE MODIFIER MOBILITY: CK
STANDING UP FROM CHAIR USING ARMS: A LITTLE
SUGGESTED CMS G CODE MODIFIER DAILY ACTIVITY: CJ
DRESSING REGULAR LOWER BODY CLOTHING: A LITTLE

## 2020-03-05 ASSESSMENT — LIFESTYLE VARIABLES
ALCOHOL_USE: NO
HAVE YOU EVER FELT YOU SHOULD CUT DOWN ON YOUR DRINKING: NO
HOW MANY TIMES IN THE PAST YEAR HAVE YOU HAD 5 OR MORE DRINKS IN A DAY: 0
TOTAL SCORE: 0
EVER_SMOKED: YES
AVERAGE NUMBER OF DAYS PER WEEK YOU HAVE A DRINK CONTAINING ALCOHOL: 0
CONSUMPTION TOTAL: NEGATIVE
TOTAL SCORE: 0
EVER FELT BAD OR GUILTY ABOUT YOUR DRINKING: NO
ON A TYPICAL DAY WHEN YOU DRINK ALCOHOL HOW MANY DRINKS DO YOU HAVE: 0
TOTAL SCORE: 0
HAVE PEOPLE ANNOYED YOU BY CRITICIZING YOUR DRINKING: NO
EVER_SMOKED: NEVER
EVER HAD A DRINK FIRST THING IN THE MORNING TO STEADY YOUR NERVES TO GET RID OF A HANGOVER: NO

## 2020-03-05 ASSESSMENT — FIBROSIS 4 INDEX: FIB4 SCORE: 1.37

## 2020-03-05 NOTE — DISCHARGE PLANNING
Outpatient Dialysis Note    Confirmed patient is active at:    [facility]  [address] [place] [address]       Schedule: Monday, Wednesday, Friday  Time: 3:15 pm    Spoke with Kate at facility who confirmed.    Forwarded records for review.      Dawna Santa- Dialysis Coordinator  [facility] # [phone]

## 2020-03-05 NOTE — ED TRIAGE NOTES
"Chief Complaint   Patient presents with   • Abnormal Labs     Pt had dialysis and labs done and was told he had low Hemaglobin; told to come to ER for transfusion   • Sent by MD   • Weakness     /73   Pulse (!) 106   Temp 36.8 °C (98.3 °F) (Temporal)   Resp 20   Ht 1.727 m (5' 8\")   Wt 72.8 kg (160 lb 7.9 oz)   SpO2 96%   BMI 24.40 kg/m²     "

## 2020-03-05 NOTE — ED PROVIDER NOTES
"ED Provider Note    CHIEF COMPLAINT  Chief Complaint   Patient presents with   • Abnormal Labs     Pt had dialysis and labs done and was told he had low Hemaglobin; told to come to ER for transfusion   • Sent by MD   • Weakness       HPI  Parmjit Castelan is a 79 y.o. male here for evaluation of weakness and shortness of breath.  Patient states that he does have history of pleural effusion, and emphysema.  He has had have a thoracentesis in the past, and he states that he feels like \"my right lung is filling up.\"  Patient has no chest pain, and no shortness of breath.  He did complete dialysis yesterday without difficulty.  States that he is having trouble up and walking around, which causes more exertion.  Patient has no other medical concerns at this time.  He has no leg pain, but does complain some generalized weakness.  No neck pain, no headache.      ROS  See HPI for further details, o/w negative.     PAST MEDICAL HISTORY   has a past medical history of Anesthesia, Anticoagulation monitoring, special range, Aortic stenosis, Arterial leg ulcer ([facility]) (11/8/2018), Atrial flutter ([facility]) (6/12/2019), Basal cell carcinoma of left cheek (3/26/2015), BPH (7/14/2009), Bronchitis (12/25/2018), CAD (coronary artery disease) (2/20/2014), CATARACT, CHF (congestive heart failure), NYHA class II, chronic, systolic ([facility]) E F30 in setting of atrial flutter (6/13/2019), Chronic respiratory failure with hypoxia ([facility]) (5/8/2016), CKD (chronic kidney disease) stage 4, GFR 15-29 ml/min ([facility]) (1/15/2010), COPD (chronic obstructive pulmonary disease) ([facility]), Detached retina, Dialysis, EMPHYSEMA, Glaucoma, Gout, Heart burn, Heart murmur, Hypertension, Indigestion, Kidney cyst, Leg pain, bilateral (8/10/2015), On supplemental oxygen therapy, Peripheral vascular disease ([facility]) (8/10/2015), Pneumonia, Primary insomnia (3/22/2018), Proteinuria, PVD (peripheral vascular disease) ([facility]), and Sleep apnea.    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       Family History  No bleeding disorders    SURGICAL HISTORY   has a past surgical history that includes cataract phaco with iol (08); av fistula creation (2010); av fistula revision (2010); av fistulogram (2010); angioplasty balloon (2010); av fistulogram (2010); angioplasty balloon (2010); vitrectomy posterior (2011); scleral buckling (2011); recovery (2011); vitrectomy posterior (10/11/2011); recovery (2012); recovery (2013); recovery (2013); av fistula thrombolysis (2013); recovery (2013); recovery (3/24/2014); recovery (2014); recovery (3/24/2015); recovery (2015); gastroscopy with biopsy (2016); colonoscopy - endo (8/15/2016); endoscopy procedure (3/21/2018); femoral endarterectomy (Left, 2019); femoral popliteal bypass (Left, 2019); angiogram (Left, 2019); other orthopedic surgery (); av fistula creation (Right, 2019); lisa (2019); cardioversion (2019); av fistula creation (Right, 2019); cath placement (Right, 2019); stent placement (2020); gastroscopy-endo (2020); small bowel endoscopy,past 2nd duod (2020); colonoscopy - endo (2020); colonoscopy,diagnostic (2020); gastroscopy (2020); capsule endoscopy administration (N/A, 2020); capsule endoscopy retrieval (2020); colonoscopy,diagnostic (N/A, 2020); gastroscopy (N/A, 2020);  colonoscopy,flex,w/control, bleeding (N/A, 2/24/2020); and gastroscopy w/push enterscopy (N/A, 2/24/2020).    CURRENT MEDICATIONS  Home Medications     Reviewed by Mee Richard (Pharmacy Tech) on 03/05/20 at 0929  Med List Status: Complete   Medication Last Dose Status   albuterol 108 (90 Base) MCG/ACT Aero Soln inhalation aerosol 3/4/2020 Active   B Complex-C-Folic Acid (DIALYVITE TABLET) Tab 3/4/2020 Active   Calcium Acetate, Phos Binder, 667 MG Cap 3/4/2020 Active   clopidogrel (PLAVIX) 75 MG Tab 3/3/2020 Active   doxycycline (MONODOX) 100 MG capsule 3/5/2020 Active   fluticasone (FLONASE) 50 MCG/ACT nasal spray 3/5/2020 Active   Fluticasone-Umeclidin-Vilant (TRELEGY ELLIPTA) 100-62.5-25 MCG/INH AEROSOL POWDER, BREATH ACTIVATED 3/5/2020 Active   levalbuterol (XOPENEX) 1.25 MG/3ML Nebu Soln 3/4/2020 Active   montelukast (SINGULAIR) 10 MG Tab 3/3/2020 Active   omeprazole (PRILOSEC) 40 MG delayed-release capsule 3/5/2020 Active   predniSONE (DELTASONE) 20 MG Tab 3/5/2020 Active   ROPINIRole (REQUIP) 0.5 MG Tab 3/5/2020 Active   rosuvastatin (CRESTOR) 40 MG tablet 3/3/2020 Active   tamsulosin (FLOMAX) 0.4 MG capsule 3/4/2020 Active   torsemide (DEMADEX) 10 MG tablet 3/4/2020 Active   traZODone (DESYREL) 150 MG Tab 3/3/2020 Active                ALLERGIES  No Known Allergies    REVIEW OF SYSTEMS  See HPI for further details. Review of systems as above, otherwise all other systems are negative.     PHYSICAL EXAM  Constitutional: Well developed, well nourished.  Mild acute distress.  HEENT: Normocephalic, atraumatic. Posterior pharynx clear and moist.  Eyes:  EOMI. Normal sclera.  Neck: Supple, Full range of motion, nontender.  Chest/Pulmonary:    Diminished breath sounds, right greater than left side.  Equal expansion  Cardio: Regular rate and rhythm with no murmur.   Abdomen: Soft, nontender. No peritoneal signs. No guarding. No palpable masses.  Back: No CVA tenderness, nontender midline, no step  offs.  Musculoskeletal: No deformity, no edema, neurovascular intact.   Neuro: Clear speech, appropriate, cooperative, cranial nerves II-XII grossly intact.  Psych: Normal mood and affect    Results for orders placed or performed during the hospital encounter of 03/05/20   CBC WITH DIFFERENTIAL   Result Value Ref Range    WBC 7.1 4.8 - 10.8 K/uL    RBC 2.44 (L) 4.70 - 6.10 M/uL    Hemoglobin 7.5 (L) 14.0 - 18.0 g/dL    Hematocrit 25.6 (L) 42.0 - 52.0 %    .9 (H) 81.4 - 97.8 fL    MCH 30.7 27.0 - 33.0 pg    MCHC 29.3 (L) 33.7 - 35.3 g/dL    RDW 68.1 (H) 35.9 - 50.0 fL    Platelet Count 205 164 - 446 K/uL    MPV 9.8 9.0 - 12.9 fL    Neutrophils-Polys 91.80 (H) 44.00 - 72.00 %    Lymphocytes 3.70 (L) 22.00 - 41.00 %    Monocytes 3.90 0.00 - 13.40 %    Eosinophils 0.00 0.00 - 6.90 %    Basophils 0.00 0.00 - 1.80 %    Immature Granulocytes 0.60 0.00 - 0.90 %    Nucleated RBC 0.00 /100 WBC    Neutrophils (Absolute) 6.54 1.82 - 7.42 K/uL    Lymphs (Absolute) 0.26 (L) 1.00 - 4.80 K/uL    Monos (Absolute) 0.28 0.00 - 0.85 K/uL    Eos (Absolute) 0.00 0.00 - 0.51 K/uL    Baso (Absolute) 0.00 0.00 - 0.12 K/uL    Immature Granulocytes (abs) 0.04 0.00 - 0.11 K/uL    NRBC (Absolute) 0.00 K/uL   COMP METABOLIC PANEL   Result Value Ref Range    Sodium 139 135 - 145 mmol/L    Potassium 4.9 3.6 - 5.5 mmol/L    Chloride 97 96 - 112 mmol/L    Co2 25 20 - 33 mmol/L    Anion Gap 17.0 (H) 7.0 - 16.0    Glucose 101 (H) 65 - 99 mg/dL    Bun 50 (H) 8 - 22 mg/dL    Creatinine 6.94 (HH) 0.50 - 1.40 mg/dL    Calcium 10.1 8.4 - 10.2 mg/dL    AST(SGOT) 17 12 - 45 U/L    ALT(SGPT) 16 2 - 50 U/L    Alkaline Phosphatase 121 (H) 30 - 99 U/L    Total Bilirubin 0.4 0.1 - 1.5 mg/dL    Albumin 3.6 3.2 - 4.9 g/dL    Total Protein 6.6 6.0 - 8.2 g/dL    Globulin 3.0 1.9 - 3.5 g/dL    A-G Ratio 1.2 g/dL   PROTHROMBIN TIME   Result Value Ref Range    PT 14.2 12.0 - 14.6 sec    INR 1.09 0.87 - 1.13   COD - Adult (Type and Screen)   Result Value Ref Range     ABO Grouping Only O     Rh Grouping Only POS     Antibody Screen-Cod NEG    ESTIMATED GFR   Result Value Ref Range    GFR If  9 (A) >60 mL/min/1.73 m 2    GFR If Non  8 (A) >60 mL/min/1.73 m 2   proBrain Natriuretic Peptide, NT   Result Value Ref Range    NT-proBNP >93207 (H) 0 - 125 pg/mL     DX-CHEST-PORTABLE (1 VIEW)   Final Result      New large right subpulmonic pleural effusion causes multisegmental atelectasis            PROCEDURES     MEDICAL RECORD  I have reviewed patient's medical record and pertinent results are listed.    COURSE & MEDICAL DECISION MAKING  I have reviewed any medical record information, laboratory studies and radiographic results as noted above.    9:15 AM; patient brought back for evaluation of some generalized weakness.  We will work him up.    10:30 AM.  Patient comfortable, no acute findings on exam.  Confirms he did dialysis last evening.    11:07 AM; patient will need to stay here in the hospital for evaluation, BNP is pending.    12:34 PM  The pt will be admitted to Dr. Woods for further evaluation.   The pt will also be seen by Dr. Hayes,  On for cards.  He would like an echocardiogram.    CRITICAL CARE  The very real possibility of a deterioration of this patient's condition required the highest level of my preparedness for sudden, emergent intervention.  I provided critical care services, which included medication orders, frequent reevaluations of the patient's condition and response to treatment, ordering and reviewing test results, and discussing the case with various consultants.  The critical care time associated with the care of the patient was 35minutes. Review chart for interventions. This time is exclusive of any other billable procedures.       FINAL IMPRESSION  1. Dyspnea, unspecified type     2. Weakness     3.       Critical care time 35 minutes.       Electronically signed by: Robinson Cisneros D.O., 3/5/2020 12:32  PM

## 2020-03-05 NOTE — ED NOTES
Rounded on pt. Pt updated on POC. Awaiting reevaluation by ERP. Pt given fresh warm blankets. Sitting at side of bed for comfort.

## 2020-03-05 NOTE — CONSULTS
Reason for Consult:  Asked by MEGHAN [email or web address] to see this patient with ADHF.    CC:   Chief Complaint   Patient presents with   • Abnormal Labs     Pt had dialysis and labs done and was told he had low Hemaglobin; told to come to ER for transfusion   • Sent by MD   • Weakness       HPI:      79 year old man with PMH ESRD on HD last yesterday cut short due to RUE AVF infiltrating, COPD, anemia of CKD, CAD, PAD, HTN, sev AS presents with ADHF.    States progressive orthopnea, dyspnea especially with exertion, only mild LE swelling. Denies chest pain or syncope nor palpitations.    Medications / Drug list prior to admission:  No current facility-administered medications on file prior to encounter.      Current Outpatient Medications on File Prior to Encounter   Medication Sig Dispense Refill   • predniSONE (DELTASONE) 20 MG Tab Take 3 Tabs by mouth every day for 5 days. 15 Tab 0   • albuterol 108 (90 Base) MCG/ACT Aero Soln inhalation aerosol Inhale 2 Puffs by mouth every four hours as needed for Shortness of Breath. 1 Inhaler 0   • doxycycline (MONODOX) 100 MG capsule Take 1 Cap by mouth 2 times a day for 10 days. 20 Cap 0   • rosuvastatin (CRESTOR) 40 MG tablet Take 1 Tab by mouth every evening. 30 Tab 11   • clopidogrel (PLAVIX) 75 MG Tab Take 75 mg by mouth every evening.     • tamsulosin (FLOMAX) 0.4 MG capsule Take 0.8 mg by mouth every day.     • ROPINIRole (REQUIP) 0.5 MG Tab TAKE TWO TABLETS BY MOUTH DAILY 180 Tab 2   • omeprazole (PRILOSEC) 40 MG delayed-release capsule TAKE ONE CAPSULE BY MOUTH DAILY 30 Cap 2   • Fluticasone-Umeclidin-Vilant (TRELEGY ELLIPTA) 100-62.5-25 MCG/INH AEROSOL POWDER, BREATH ACTIVATED Inhale 1 Inhaler by mouth every day. 1 Each 6   • montelukast (SINGULAIR) 10 MG Tab Take 1 Tab by mouth every evening. 90 Tab 3   • fluticasone (FLONASE) 50 MCG/ACT nasal spray Spray 1-2 Sprays in nose every day. Each nostril. 1 Bottle 6   • traZODone (DESYREL) 150 MG Tab Take 150-300 mg by  mouth every bedtime.     • B Complex-C-Folic Acid (DIALYVITE TABLET) Tab TAKE ONE TABLET BY MOUTH DAILY 90 Tab 3   • Calcium Acetate, Phos Binder, 667 MG Cap TAKE 3 CAPSULES BY MOUTH WITH MEALS (3 MEALS) AND 2 CAPSULES WITH SNACKS (2 SNACKS) 420 Cap 11   • torsemide (DEMADEX) 10 MG tablet TAKE THREE TABLETS BY MOUTH DAILY 270 Tab 3   • levalbuterol (XOPENEX) 1.25 MG/3ML Nebu Soln 3 mL by Nebulization route every four hours as needed for Shortness of Breath. 120 Bullet 11       Current list of administered Medications:    Current Facility-Administered Medications:   •  Respiratory Therapy Consult, , Nebulization, Continuous RT, Lucia Woods M.D.  •  heparin injection 5,000 Units, 5,000 Units, Subcutaneous, Q8HRS, Lucia Woods M.D.  •  acetaminophen (TYLENOL) tablet 650 mg, 650 mg, Oral, Q6HRS PRN, Lucia Woods M.D.  •  ondansetron (ZOFRAN) syringe/vial injection 4 mg, 4 mg, Intravenous, Q4HRS PRN, Lucia Woods M.D.  •  ondansetron (ZOFRAN ODT) dispertab 4 mg, 4 mg, Oral, Q4HRS PRN, Lucia Woods M.D.  •  calcium acetate (PHOS-LO) capsule 1,334 mg, 1,334 mg, Oral, TID AC, Lucia Woods M.D.  •  clopidogrel (PLAVIX) tablet 75 mg, 75 mg, Oral, Q EVENING, Lucia Woods M.D.  •  DIALYVITE TABLET TABS 1 Tab, 1 Tab, Oral, DAILY, Lucia Woods M.D.  •  doxycycline monohydrate (ADOXA) tablet 100 mg, 100 mg, Oral, Q12HRS, Lucia Woods M.D.  •  fluticasone (FLONASE) nasal spray  mcg, 1-2 Spray, Nasal, DAILY, Lucia Woods M.D.  •  Fluticasone-Umeclidin-Vilant 100-62.5-25 MCG/INH AEPB 1 Inhaler, 1 Inhaler, Inhalation, DAILY, Lucia Woods M.D.  •  levalbuterol (XOPENEX) 1.25 MG/3ML nebulizer solution 1.25 mg, 1.25 mg, Nebulization, Q4HRS PRN, Lucia Woods M.D.  •  montelukast (SINGULAIR) tablet 10 mg, 10 mg, Oral, Q EVENING, uLcia Woods M.D.  •  omeprazole (PRILOSEC) delayed-release capsule CPDR 40 mg, 40 mg,  Oral, DAILY, Lucia Woods M.D.  •  predniSONE (DELTASONE) tablet 60 mg, 60 mg, Oral, DAILY, Lucia Woods M.D.  •  ROPINIRole (REQUIP) tablet 1 mg, 1 mg, Oral, QHS, Lucia Woods M.D.  •  rosuvastatin (CRESTOR) tablet 40 mg, 40 mg, Oral, Q EVENING, Lucia Woods M.D.  •  tamsulosin (FLOMAX) capsule 0.8 mg, 0.8 mg, Oral, DAILY, Lucia Woods M.D.  •  torsemide (DEMADEX) TABS 30 mg, 30 mg, Oral, Q DAY, Lucia Woods M.D.  •  traZODone (DESYREL) tablet 150-300 mg, 150-300 mg, Oral, QHS, Lucia Woods M.D.    Current Outpatient Medications:   •  predniSONE (DELTASONE) 20 MG Tab, Take 3 Tabs by mouth every day for 5 days., Disp: 15 Tab, Rfl: 0  •  albuterol 108 (90 Base) MCG/ACT Aero Soln inhalation aerosol, Inhale 2 Puffs by mouth every four hours as needed for Shortness of Breath., Disp: 1 Inhaler, Rfl: 0  •  doxycycline (MONODOX) 100 MG capsule, Take 1 Cap by mouth 2 times a day for 10 days., Disp: 20 Cap, Rfl: 0  •  rosuvastatin (CRESTOR) 40 MG tablet, Take 1 Tab by mouth every evening., Disp: 30 Tab, Rfl: 11  •  clopidogrel (PLAVIX) 75 MG Tab, Take 75 mg by mouth every evening., Disp: , Rfl:   •  tamsulosin (FLOMAX) 0.4 MG capsule, Take 0.8 mg by mouth every day., Disp: , Rfl:   •  ROPINIRole (REQUIP) 0.5 MG Tab, TAKE TWO TABLETS BY MOUTH DAILY, Disp: 180 Tab, Rfl: 2  •  omeprazole (PRILOSEC) 40 MG delayed-release capsule, TAKE ONE CAPSULE BY MOUTH DAILY, Disp: 30 Cap, Rfl: 2  •  Fluticasone-Umeclidin-Vilant (TRELEGY ELLIPTA) 100-62.5-25 MCG/INH AEROSOL POWDER, BREATH ACTIVATED, Inhale 1 Inhaler by mouth every day., Disp: 1 Each, Rfl: 6  •  montelukast (SINGULAIR) 10 MG Tab, Take 1 Tab by mouth every evening., Disp: 90 Tab, Rfl: 3  •  fluticasone (FLONASE) 50 MCG/ACT nasal spray, Spray 1-2 Sprays in nose every day. Each nostril., Disp: 1 Bottle, Rfl: 6  •  traZODone (DESYREL) 150 MG Tab, Take 150-300 mg by mouth every bedtime., Disp: , Rfl:   •  B  "Complex-C-Folic Acid (DIALYVITE TABLET) Tab, TAKE ONE TABLET BY MOUTH DAILY, Disp: 90 Tab, Rfl: 3  •  Calcium Acetate, Phos Binder, 667 MG Cap, TAKE 3 CAPSULES BY MOUTH WITH MEALS (3 MEALS) AND 2 CAPSULES WITH SNACKS (2 SNACKS), Disp: 420 Cap, Rfl: 11  •  torsemide (DEMADEX) 10 MG tablet, TAKE THREE TABLETS BY MOUTH DAILY, Disp: 270 Tab, Rfl: 3  •  levalbuterol (XOPENEX) 1.25 MG/3ML Nebu Soln, 3 mL by Nebulization route every four hours as needed for Shortness of Breath., Disp: 120 Bullet, Rfl: 11    Past Medical History:   Diagnosis Date   • Anesthesia     \"needs more sedation\" (can sometimes hear MD during procedure)    • Anticoagulation monitoring, special range    • Aortic stenosis     mod AS- concern for low flow severe AS with rEFof 30%   • Arterial leg ulcer ([facility]) 11/8/2018   • Atrial flutter ([facility]) 6/12/2019   • Basal cell carcinoma of left cheek 3/26/2015   • BPH 7/14/2009   • Bronchitis 12/25/2018   • CAD (coronary artery disease) 2/20/2014   • CATARACT     sanjuanita surgery complete   • CHF (congestive heart failure), NYHA class II, chronic, systolic ([facility]) E F30 in setting of atrial flutter 6/13/2019   • Chronic respiratory failure with hypoxia ([facility]) 5/8/2016   • CKD (chronic kidney disease) stage 4, GFR 15-29 ml/min ([facility]) 1/15/2010    end stage renal disease   • COPD (chronic obstructive pulmonary disease) ([facility])     wears 2.5 L o2 sometimes when he sleeps   • Detached retina    • Dialysis     m,w,f [facility]   • EMPHYSEMA    • Glaucoma     Early stage   • Gout    • Heart burn     occas   • Heart murmur     maria esther lee cardiologist   • Hypertension    • Indigestion     occas   • Kidney cyst    • Leg pain, bilateral 8/10/2015   • On supplemental oxygen therapy    • Peripheral vascular disease ([facility]) 8/10/2015   • Pneumonia    • Primary insomnia 3/22/2018   • Proteinuria    • PVD (peripheral vascular disease) ([facility])    • Sleep apnea     O2 PER CANULA  AT NIGHT 2l/m       Past Surgical History: "   Procedure Laterality Date   • PB COLONOSCOPY,FLEX,W/CONTROL, BLEEDING N/A 2/24/2020    Procedure: COLONOSCOPY, WITH ARGON PLASMA COAGULATION;  Surgeon: Juan Matthews M.D.;  Location: SURGERY SAME DAY [facility];  Service: Gastroenterology   • GASTROSCOPY W/PUSH ENTERSCOPY N/A 2/24/2020    Procedure: GASTROSCOPY, WITH PUSH ENTEROSCOPY;  Surgeon: Juan Matthews M.D.;  Location: SURGERY SAME DAY [facility];  Service: Gastroenterology   • PB COLONOSCOPY,DIAGNOSTIC N/A 2/23/2020    Procedure: COLONOSCOPY;  Surgeon: Enrique Child D.O.;  Location: SURGERY [facility];  Service: Gastroenterology   • GASTROSCOPY N/A 2/23/2020    Procedure: GASTROSCOPY;  Surgeon: Enrique Child D.O.;  Location: SURGERY [facility];  Service: Gastroenterology   • CAPSULE ENDOSCOPY ADMINISTRATION N/A 2/20/2020    Procedure: ADMINISTRATION, CAPSULE, FOR CAPSULE ENDOSCOPY;  Surgeon: Gucci Westbrook M.D.;  Location: ENDOSCOPY [facility];  Service: Gastroenterology   • CAPSULE ENDOSCOPY RETRIEVAL  2/20/2020    Procedure: CAPSULE ENDOSCOPY RETRIEVAL;  Surgeon: Gucci Westbrook M.D.;  Location: ENDOSCOPY [facility];  Service: Gastroenterology   • PB SMALL BOWEL ENDOSCOPY,PAST 2ND DUOD  2/19/2020        • COLONOSCOPY - ENDO  2/19/2020        • PB COLONOSCOPY,DIAGNOSTIC  2/19/2020    Procedure: COLONOSCOPY;  Surgeon: Gucci Westbrook M.D.;  Location: SURGERY [facility];  Service: Gastroenterology   • GASTROSCOPY  2/19/2020    Procedure: GASTROSCOPY;  Surgeon: Gucci Westbrook M.D.;  Location: SURGERY [facility];  Service: Gastroenterology   • GASTROSCOPY-ENDO  2/7/2020    Procedure: GASTROSCOPY;  Surgeon: Jong Carrasquillo M.D.;  Location: ENDOSCOPY [facility];  Service: Gastroenterology   • STENT PLACEMENT  01/26/2020    lda   • AV FISTULA CREATION Right 8/6/2019    Procedure: CREATION, AV FISTULA -  RIGHT ARM  ,  and Ligation of Left Arm Fistula;  Surgeon: Sera Pteers M.D.;  Location:  SURGERY [facility];  Service: General   • CATH PLACEMENT Right 8/6/2019    Procedure: INSERTION, CATHETER - PERMA ,;  Surgeon: Sera Peters M.D.;  Location: SURGERY [facility];  Service: General   • JUSTIN  6/13/2019    Procedure: ECHOCARDIOGRAM, TRANSESOPHAGEAL;  Surgeon: Harvinder Hoang M.D.;  Location: [facility];  Service: Cardiac   • CARDIOVERSION  6/13/2019    Procedure: CARDIOVERSION;  Surgeon: Harvinder Hoang M.D.;  Location: [facility];  Service: Cardiac   • AV FISTULA CREATION Right 2/21/2019    Procedure: AV FISTULA CREATION - ARM;  Surgeon: David Cason M.D.;  Location: SURGERY [facility];  Service: General   • FEMORAL ENDARTERECTOMY Left 1/25/2019    Procedure: FEMORAL ENDARTERECTOMY;  Surgeon: David Cason M.D.;  Location: SURGERY [facility];  Service: General   • FEMORAL POPLITEAL BYPASS Left 1/25/2019    Procedure: LEFT FEMORAL POPLITEAL POLYTETRAFLUOROETHYLENE ePTFE(PROPATEN VASCULAR GRAFT) BYPASS;  Surgeon: David Cason M.D.;  Location: [facility];  Service: General   • ANGIOGRAM Left 1/25/2019    Procedure: LEFT LEG ANGIOGRAM;  Surgeon: David Cason M.D.;  Location: [facility];  Service: General   • ENDOSCOPY PROCEDURE  3/21/2018    Procedure: ENDOSCOPY PROCEDURE/UPPER;  Surgeon: Enrique Child D.O.;  Location: [facility];  Service: Gastroenterology   • COLONOSCOPY - ENDO  8/15/2016    Procedure: COLONOSCOPY - ENDO;  Surgeon: Mane Whatley M.D.;  Location: ENDOSCOPY [facility];  Service:    • GASTROSCOPY WITH BIOPSY  8/13/2016    Procedure: GASTROSCOPY WITH BIOPSY;  Surgeon: Jorge Leavitt M.D.;  Location: ENDOSCOPY [facility];  Service:    • RECOVERY  12/23/2015    Procedure: VASCULAR CASE-CASON-LEFT ARM FISTULOGRAM WITH ANGIOPLASTY;  Surgeon: Recoveryonbhavani Surgery;  Location: SURGERY PRE-POST PROC [facility];  Service:    • RECOVERY  3/24/2015    Performed by  Recoveryonly Surgery at SURGERY PRE-POST PROC UNIT [facility]   • RECOVERY  7/29/2014    Performed by Ir-Recovery Surgery at SURGERY SAME DAY [facility]   • RECOVERY  3/24/2014    Performed by Ir[facility]Recovery Surgery at SURGERY [facility]   • RECOVERY  12/17/2013    Performed by Ir-Recovery Surgery at SURGERY SAME DAY [facility]   • RECOVERY  7/2/2013    Performed by [facility]Recovery Surgery at SURGERY SAME DAY [facility]   • AV FISTULA THROMBOLYSIS  7/2/2013    Performed by David Cason M.D. at SURGERY [facility]   • RECOVERY  1/29/2013    Performed by Ir-Recovery Surgery at SURGERY SAME DAY [facility]   • RECOVERY  7/23/2012    Performed by SURGERY, IR-RECOVERY at SURGERY SAME DAY [facility]   • VITRECTOMY POSTERIOR  10/11/2011    Performed by NAHUM GE at SURGERY SAME DAY [facility]   • RECOVERY  8/12/2011    Performed by SURGERY, IR-RECOVERY at SURGERY SAME DAY [facility]   • VITRECTOMY POSTERIOR  1/18/2011    Performed by NAHUM GE at SURGERY SAME DAY [facility]   • SCLERAL BUCKLING  1/18/2011    Performed by NAHUM GE at SURGERY SAME DAY [facility]   • AV FISTULOGRAM  9/17/2010    Performed by DAVID CASON at SURGERY [facility] ORS   • ANGIOPLASTY BALLOON  9/17/2010    Performed by DAVID CASON at SURGERY [facility] ORS   • AV FISTULOGRAM  7/23/2010    Performed by DAVID CASON at SURGERY [facility] ORS   • ANGIOPLASTY BALLOON  7/23/2010    Performed by DAVID CASON at SURGERY [facility] ORS   • AV FISTULA REVISION  2/19/2010    Performed by WILLIE HATCH at SURGERY [facility] ORS   • AV FISTULA CREATION  2/12/2010    Performed by DAVID CASON at SURGERY [facility] ORS   • CATARACT PHACO WITH IOL  4/8/08    Performed by STACEY PHILLIPS at SURGERY SAME DAY [facility]   • OTHER ORTHOPEDIC SURGERY  1998    right toe for facititis       Family History   Problem Relation Age of Onset   • Stroke Mother    • Hypertension Mother    •  Lung Disease Father         Emphysema, resp failure   • Genitourinary () Problems Maternal Aunt         hematuria   • Hypertension Brother      Patient family history was personally reviewed, no pertinent family history to current presentation    Social History     Socioeconomic History   • Marital status:      Spouse name: Not on file   • Number of children: Not on file   • Years of education: Not on file   • Highest education level: Not on file   Occupational History   • Not on file   Social Needs   • Financial resource strain: Not on file   • Food insecurity     Worry: Not on file     Inability: Not on file   • Transportation needs     Medical: Not on file     Non-medical: Not on file   Tobacco Use   • Smoking status: Former Smoker     Packs/day: 1.00     Years: 40.00     Pack years: 40.00     Types: Cigarettes     Last attempt to quit: 2009     Years since quittin.1   • Smokeless tobacco: Never Used   • Tobacco comment: 1 pk a day for 35 yrs, QUIT 2010   Substance and Sexual Activity   • Alcohol use: No     Alcohol/week: 0.0 oz   • Drug use: No   • Sexual activity: Never     Partners: Female     Comment: , two sons, 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   Lifestyle   • Physical activity     Days per week: Not on file     Minutes per session: Not on file   • Stress: Not on file   Relationships   • Social connections     Talks on phone: Not on file     Gets together: Not on file     Attends [employer] service: Not on file     Active member of club or organization: Not on file     Attends meetings of clubs or organizations: Not on file     Relationship status: Not on file   • Intimate partner violence     Fear of current or ex partner: Not on file     Emotionally abused: Not on file     Physically abused: Not on file     Forced sexual activity: Not on file   Other Topics Concern   • Not on file   Social History Narrative   • Not on file       ALLERGIES:  No Known Allergies    Review of  "systems:  A complete review of symptoms was reviewed with patient. This is reviewed in H&P and PMH. ALL OTHERS reviewed and negative    Physical exam:  Patient Vitals for the past 24 hrs:   BP Temp Temp src Pulse Resp SpO2 Height Weight   03/05/20 1258 -- -- -- 87 16 -- -- --   03/05/20 1230 149/69 -- -- 95 -- -- -- --   03/05/20 1229 -- -- -- 96 -- 97 % -- --   03/05/20 1215 (!) 162/80 -- -- 90 -- 99 % -- --   03/05/20 0949 -- -- -- 94 -- 97 % -- --   03/05/20 0948 145/77 -- -- -- -- -- -- --   03/05/20 0918 154/71 -- -- 94 20 95 % -- --   03/05/20 0903 -- -- -- 95 (!) 22 94 % -- --   03/05/20 0827 143/73 36.8 °C (98.3 °F) Temporal (!) 106 20 96 % 1.727 m (5' 8\") 72.8 kg (160 lb 7.9 oz)     General: No acute distress.   EYES: no jaundice  HEENT: OP clear   Neck: No bruits No JVD.   CVS:  RRR. S1 + S2. No M/R/G. Trace edema.  Resp: rales, decreased resp excursion.  Abdomen: Soft, NT, ND,  Skin: Grossly nothing acute no obvious rashes  Neurological: Alert, Moves all extremities, no cranial nerve defects on limited exam  Extremities: Pulse 2+ in b/l LE. No cyanosis.     Data:  Laboratory studies personally reviewed by me:  Recent Results (from the past 24 hour(s))   CBC WITH DIFFERENTIAL    Collection Time: 03/04/20  4:00 PM   Result Value Ref Range    WBC 5.3 4.8 - 10.8 K/uL    RBC 2.19 (L) 4.70 - 6.10 M/uL    Hemoglobin 6.7 (L) 14.0 - 18.0 g/dL    Hematocrit 23.2 (L) 42.0 - 52.0 %    .9 (H) 81.4 - 97.8 fL    MCH 30.6 27.0 - 33.0 pg    MCHC 28.9 (L) 33.7 - 35.3 g/dL    RDW 69.6 (H) 35.9 - 50.0 fL    Platelet Count 211 164 - 446 K/uL    MPV 10.4 9.0 - 12.9 fL    Neutrophils-Polys 93.40 (H) 44.00 - 72.00 %    Lymphocytes 3.40 (L) 22.00 - 41.00 %    Monocytes 3.00 0.00 - 13.40 %    Eosinophils 0.00 0.00 - 6.90 %    Basophils 0.00 0.00 - 1.80 %    Immature Granulocytes 0.20 0.00 - 0.90 %    Nucleated RBC 0.00 /100 WBC    Neutrophils (Absolute) 4.97 1.82 - 7.42 K/uL    Lymphs (Absolute) 0.18 (L) 1.00 - 4.80 K/uL    " Monos (Absolute) 0.16 0.00 - 0.85 K/uL    Eos (Absolute) 0.00 0.00 - 0.51 K/uL    Baso (Absolute) 0.00 0.00 - 0.12 K/uL    Immature Granulocytes (abs) 0.01 0.00 - 0.11 K/uL    NRBC (Absolute) 0.00 K/uL    Anisocytosis 1+     Microcytosis 1+    PERIPHERAL SMEAR REVIEW    Collection Time: 03/04/20  4:00 PM   Result Value Ref Range    Peripheral Smear Review see below    PLATELET ESTIMATE    Collection Time: 03/04/20  4:00 PM   Result Value Ref Range    Plt Estimation Normal    MORPHOLOGY    Collection Time: 03/04/20  4:00 PM   Result Value Ref Range    RBC Morphology Present     Poikilocytosis 1+     Echinocytes 1+    DIFFERENTIAL COMMENT    Collection Time: 03/04/20  4:00 PM   Result Value Ref Range    Comments-Diff see below    CBC WITH DIFFERENTIAL    Collection Time: 03/05/20  9:06 AM   Result Value Ref Range    WBC 7.1 4.8 - 10.8 K/uL    RBC 2.44 (L) 4.70 - 6.10 M/uL    Hemoglobin 7.5 (L) 14.0 - 18.0 g/dL    Hematocrit 25.6 (L) 42.0 - 52.0 %    .9 (H) 81.4 - 97.8 fL    MCH 30.7 27.0 - 33.0 pg    MCHC 29.3 (L) 33.7 - 35.3 g/dL    RDW 68.1 (H) 35.9 - 50.0 fL    Platelet Count 205 164 - 446 K/uL    MPV 9.8 9.0 - 12.9 fL    Neutrophils-Polys 91.80 (H) 44.00 - 72.00 %    Lymphocytes 3.70 (L) 22.00 - 41.00 %    Monocytes 3.90 0.00 - 13.40 %    Eosinophils 0.00 0.00 - 6.90 %    Basophils 0.00 0.00 - 1.80 %    Immature Granulocytes 0.60 0.00 - 0.90 %    Nucleated RBC 0.00 /100 WBC    Neutrophils (Absolute) 6.54 1.82 - 7.42 K/uL    Lymphs (Absolute) 0.26 (L) 1.00 - 4.80 K/uL    Monos (Absolute) 0.28 0.00 - 0.85 K/uL    Eos (Absolute) 0.00 0.00 - 0.51 K/uL    Baso (Absolute) 0.00 0.00 - 0.12 K/uL    Immature Granulocytes (abs) 0.04 0.00 - 0.11 K/uL    NRBC (Absolute) 0.00 K/uL   COMP METABOLIC PANEL    Collection Time: 03/05/20  9:06 AM   Result Value Ref Range    Sodium 139 135 - 145 mmol/L    Potassium 4.9 3.6 - 5.5 mmol/L    Chloride 97 96 - 112 mmol/L    Co2 25 20 - 33 mmol/L    Anion Gap 17.0 (H) 7.0 - 16.0     Glucose 101 (H) 65 - 99 mg/dL    Bun 50 (H) 8 - 22 mg/dL    Creatinine 6.94 (HH) 0.50 - 1.40 mg/dL    Calcium 10.1 8.4 - 10.2 mg/dL    AST(SGOT) 17 12 - 45 U/L    ALT(SGPT) 16 2 - 50 U/L    Alkaline Phosphatase 121 (H) 30 - 99 U/L    Total Bilirubin 0.4 0.1 - 1.5 mg/dL    Albumin 3.6 3.2 - 4.9 g/dL    Total Protein 6.6 6.0 - 8.2 g/dL    Globulin 3.0 1.9 - 3.5 g/dL    A-G Ratio 1.2 g/dL   PROTHROMBIN TIME    Collection Time: 03/05/20  9:06 AM   Result Value Ref Range    PT 14.2 12.0 - 14.6 sec    INR 1.09 0.87 - 1.13   COD - Adult (Type and Screen)    Collection Time: 03/05/20  9:06 AM   Result Value Ref Range    ABO Grouping Only O     Rh Grouping Only POS     Antibody Screen-Cod NEG    ESTIMATED GFR    Collection Time: 03/05/20  9:06 AM   Result Value Ref Range    GFR If  9 (A) >60 mL/min/1.73 m 2    GFR If Non  8 (A) >60 mL/min/1.73 m 2   proBrain Natriuretic Peptide, NT    Collection Time: 03/05/20  9:06 AM   Result Value Ref Range    NT-proBNP >06424 (H) 0 - 125 pg/mL   Cytology    Collection Time: 03/05/20  3:43 PM   Result Value Ref Range    Cytology Reg See Path Report      TTE 2/2020  CONCLUSIONS  Compared to the images of the prior study done 10/24/2019, no   significant change.  Limited exam requested, no RVSP.  Left ventricular ejection fraction is visually estimated to be 60%.  Mild concentric left ventricular hypertrophy.  Grade II diastolic dysfunction.  Severely dilated left atrium.  Heavily calcified trileaflet aortic valve with severe aortic stenosis:   Vmax is 4.3  m/s, Mg 41 mmHg, CELIO 0.8 cm2.  Mild mitral regurgitation.      Active Problems:    ESRD (end stage renal disease) on dialysis ([facility]) POA: Yes    Pain, upper back POA: Unknown    BPH (benign prostatic hypertrophy) POA: Yes      Overview: ICD-10 transition    COPD (chronic obstructive pulmonary disease) ([facility]) POA: Yes    Anemia in CKD (chronic kidney disease) POA: Yes  Resolved Problems:    * No resolved  hospital problems. *      Assessment / Plan:    79 year old man with PMH ESRD on HD last yesterday cut short due to RUE AVF infiltrating, COPD, anemia of CKD, CAD, PAD, HTN, sev AS presents with ADHF.    *follow up repeat TTE  *check EKG  -will need diuresis, however, makes little urine  **daily HD sessions, please consult nephro  -cont home cardiac meds  -pending TAVR at [facility] - can consider transfer to proceed with procedure given patient's heart failure as indication of high risk mortality without intervention    Please call back with any further cardiac questions.     I personally discussed his case with Dr Rubi.    It is my pleasure to participate in the care of Mr. Castelan.  Please do not hesitate to contact me with questions or concerns.    Mikhail Hayes MD  Cardiologist [facility]

## 2020-03-06 LAB
GRAM STN SPEC: ABNORMAL
RHODAMINE-AURAMINE STN SPEC: NORMAL
SIGNIFICANT IND 70042: ABNORMAL
SIGNIFICANT IND 70042: NORMAL
SITE SITE: ABNORMAL
SITE SITE: NORMAL
SOURCE SOURCE: ABNORMAL
SOURCE SOURCE: NORMAL

## 2020-03-06 PROCEDURE — 90935 HEMODIALYSIS ONE EVALUATION: CPT | Performed by: INTERNAL MEDICINE

## 2020-03-06 PROCEDURE — 5A1D70Z PERFORMANCE OF URINARY FILTRATION, INTERMITTENT, LESS THAN 6 HOURS PER DAY: ICD-10-PCS | Performed by: INTERNAL MEDICINE

## 2020-03-06 PROCEDURE — 770020 HCHG ROOM/CARE - TELE (206)

## 2020-03-06 PROCEDURE — 700111 HCHG RX REV CODE 636 W/ 250 OVERRIDE (IP): Performed by: INTERNAL MEDICINE

## 2020-03-06 PROCEDURE — 99233 SBSQ HOSP IP/OBS HIGH 50: CPT | Performed by: HOSPITALIST

## 2020-03-06 PROCEDURE — 99222 1ST HOSP IP/OBS MODERATE 55: CPT | Performed by: INTERNAL MEDICINE

## 2020-03-06 PROCEDURE — A9270 NON-COVERED ITEM OR SERVICE: HCPCS | Performed by: INTERNAL MEDICINE

## 2020-03-06 PROCEDURE — 94760 N-INVAS EAR/PLS OXIMETRY 1: CPT

## 2020-03-06 PROCEDURE — 94640 AIRWAY INHALATION TREATMENT: CPT

## 2020-03-06 PROCEDURE — 700101 HCHG RX REV CODE 250: Performed by: INTERNAL MEDICINE

## 2020-03-06 PROCEDURE — 90935 HEMODIALYSIS ONE EVALUATION: CPT

## 2020-03-06 PROCEDURE — 700102 HCHG RX REV CODE 250 W/ 637 OVERRIDE(OP): Performed by: INTERNAL MEDICINE

## 2020-03-06 RX ORDER — FLUTICASONE PROPIONATE 44 UG/1
2 AEROSOL, METERED RESPIRATORY (INHALATION)
Status: DISCONTINUED | OUTPATIENT
Start: 2020-03-07 | End: 2020-03-07 | Stop reason: HOSPADM

## 2020-03-06 RX ADMIN — OMEPRAZOLE 40 MG: 20 CAPSULE, DELAYED RELEASE ORAL at 05:30

## 2020-03-06 RX ADMIN — NEPHROCAP 1 CAPSULE: 1 CAP ORAL at 05:30

## 2020-03-06 RX ADMIN — LIDOCAINE HYDROCHLORIDE 1 ML: 10 INJECTION, SOLUTION INFILTRATION; PERINEURAL at 09:18

## 2020-03-06 RX ADMIN — CALCIUM ACETATE 1334 MG: 667 CAPSULE ORAL at 17:58

## 2020-03-06 RX ADMIN — ROPINIROLE HYDROCHLORIDE 1 MG: 0.5 TABLET, FILM COATED ORAL at 21:38

## 2020-03-06 RX ADMIN — FLUTICASONE PROPIONATE 88 MCG: 44 AEROSOL, METERED RESPIRATORY (INHALATION) at 08:31

## 2020-03-06 RX ADMIN — PREDNISONE 60 MG: 10 TABLET ORAL at 05:31

## 2020-03-06 RX ADMIN — DOXYCYCLINE 100 MG: 100 TABLET, FILM COATED ORAL at 05:30

## 2020-03-06 RX ADMIN — CLOPIDOGREL BISULFATE 75 MG: 75 TABLET ORAL at 17:58

## 2020-03-06 RX ADMIN — ROSUVASTATIN CALCIUM 40 MG: 10 TABLET, COATED ORAL at 17:58

## 2020-03-06 RX ADMIN — CALCIUM ACETATE 1334 MG: 667 CAPSULE ORAL at 07:58

## 2020-03-06 RX ADMIN — CALCIUM ACETATE 1334 MG: 667 CAPSULE ORAL at 12:08

## 2020-03-06 RX ADMIN — TRAZODONE HYDROCHLORIDE 300 MG: 50 TABLET ORAL at 21:38

## 2020-03-06 RX ADMIN — TORSEMIDE 30 MG: 10 TABLET ORAL at 05:30

## 2020-03-06 RX ADMIN — UMECLIDINIUM BROMIDE AND VILANTEROL TRIFENATATE 1 PUFF: 62.5; 25 POWDER RESPIRATORY (INHALATION) at 08:29

## 2020-03-06 RX ADMIN — MONTELUKAST 10 MG: 10 TABLET, FILM COATED ORAL at 17:58

## 2020-03-06 RX ADMIN — DOXYCYCLINE 100 MG: 100 TABLET, FILM COATED ORAL at 20:24

## 2020-03-06 RX ADMIN — FLUTICASONE PROPIONATE 100 MCG: 50 SPRAY, METERED NASAL at 05:31

## 2020-03-06 RX ADMIN — TAMSULOSIN HYDROCHLORIDE 0.8 MG: 0.4 CAPSULE ORAL at 05:31

## 2020-03-06 ASSESSMENT — ENCOUNTER SYMPTOMS
TREMORS: 0
HEMOPTYSIS: 0
CHILLS: 0
GASTROINTESTINAL NEGATIVE: 1
CONSTITUTIONAL NEGATIVE: 1
CARDIOVASCULAR NEGATIVE: 1
NAUSEA: 0
BLURRED VISION: 0
COUGH: 0
EYES NEGATIVE: 1
HEARTBURN: 0
PSYCHIATRIC NEGATIVE: 1
TINGLING: 0
VOMITING: 0
MUSCULOSKELETAL NEGATIVE: 1
DOUBLE VISION: 0
SENSORY CHANGE: 0
CONSTIPATION: 0
FEVER: 0
ABDOMINAL PAIN: 0
MYALGIAS: 1
NEUROLOGICAL NEGATIVE: 1
SHORTNESS OF BREATH: 1
DIARRHEA: 0

## 2020-03-06 ASSESSMENT — FIBROSIS 4 INDEX: FIB4 SCORE: 1.6

## 2020-03-06 NOTE — ASSESSMENT & PLAN NOTE
Likely etiology ESRD and aortic stenosis   1600 cc removed via thoracentesis  Await labs  Continue doxycycline for now

## 2020-03-06 NOTE — CONSULTS
DATE OF SERVICE:  03/05/2020    NEPHROLOGY CONSULTATION    REQUESTING PHYSICIAN:  Lucia Woods MD    REASON FOR CONSULTATION:  To evaluate and provide dialysis for patient with   end-stage renal disease.    HISTORY OF PRESENT ILLNESS:  The patient is a 79-year-old male with history of   end-stage renal disease, on hemodialysis, recent gastrointestinal bleedings,   status post endoscopy chronic anemia, admitted to the hospital for evaluation   of weakness, shortness of breath, consulted with cardiology who are planning   to replace aortic valve due to severe stenosis.  Currently, patient is   complaining of weakness, fatigue, mild shortness of breath.  No nausea or   vomiting.  No fever or chills.  No cough, no hemoptysis.  Last dialysis   completed yesterday.    REVIEW OF SYSTEMS:  GENERAL:  Positive for fatigue.  No fever or chills.  HEENT:  No nosebleeds, no congestion, no sore throat.  Eyes, no double or   blurry vision.  NECK:  No pain, no stiffness.  RESPIRATORY:  Positive for shortness of breath, no cough, no hemoptysis.  CARDIOVASCULAR:  Positive for dyspnea on exertion.  No chest pain, no   palpitation.  GASTROINTESTINAL:  No abdominal pain, no nausea, vomiting, or diarrhea.  All other systems reviewed, all negative.    PAST MEDICAL HISTORY:  End-stage renal disease, on hemodialysis, aortic   stenosis, atrial flutter, congestive heart failure, hypertension,   gastrointestinal bleeding, anemia, peripheral vascular disease, and sleep   apnea.    PAST SURGICAL HISTORY:  Cataract, AV fistula creation, AV fistula revision,   colonoscopy, gastroscopy, femoral endarterectomy, femoropopliteal bypass   graft, and coronary stenting.    FAMILY HISTORY:  No history of kidney disease.    SOCIAL HISTORY:  Smoked tobacco, quit several years ago.  No alcohol or drug   use.    ALLERGIES:  No known drug allergies.    OUTPATIENT MEDICATIONS:  Reviewed.    PHYSICAL EXAMINATION:  VITAL SIGNS:  Blood pressure 153/64,  temperature 36.4, heart rate 92.  GENERAL APPEARANCE:  Well-developed, well-nourished male in no acute distress.  HEENT:  Normocephalic, atraumatic.  Pupils equal, round, and reactive to   light.  Extraocular movement intact.  Nares patent.  Oropharynx clear, moist   mucosa.  No erythema or exudate.  NECK:  Supple, no lymphadenopathy, no thyromegaly appreciated.  LUNGS:  Clear to auscultation bilaterally.  No wheezes, no rales or rhonchi.  HEART:  Regular rate.  Positive for murmur, no gallop.  ABDOMEN:  Soft, nontender, and nondistended.  No palpable masses.  Bowel   sounds present.  EXTREMITIES:  Trace pedal edema bilaterally.  No cyanosis.  NEUROLOGIC:  Alert and oriented x3, no focal deficit.    LABORATORY RESULTS:  Reviewed, revealed hemoglobin level 7.5.  Sodium 139,   potassium 4.9, CO2 of 25, BUN 15, creatinine 6.9.    ASSESSMENT AND PLAN:  The patient is a 79-year-old male with multiple medical   problems, end-stage renal disease, on hemodialysis, admitted for possible   transcatheter aortic valve replacement.  1.  End-stage renal disease.  We will be continue as per patient's schedule   Monday, Wednesday, and Friday.  2.  Volume.  We will provide pure ultrafiltration today with 2-3 liters as   blood pressure tolerates.  3.  Hypertension.  Blood pressure well controlled.  4.  Electrolytes are well controlled.  5.  Anemia.  We will opt Epogen with dialysis, to monitor closely.  Avoid   heparin at present time.    RECOMMENDATIONS:  Pure ultrafiltration today, hemodialysis tomorrow, then   Monday, Wednesday, and Friday.  Renal diet.  All medications adjust to renal   doses.    Thank you for the consult.       ____________________________________     MD RYLEE VIRAMONTES / DELANO    DD:  03/05/2020 19:34:53  DT:  03/05/2020 22:30:27    D#:  [id number]  Job#:  [id number]

## 2020-03-06 NOTE — CARE PLAN
Problem: Infection  Goal: Will remain free from infection  Outcome: PROGRESSING AS EXPECTED  Intervention: Implement standard precautions and perform hand washing before and after patient contact  Note: Standard precautions and hand hygiene practices implemented before and after patient room entry. Gloves worn during times of patient contact.       Problem: Knowledge Deficit  Goal: Knowledge of disease process/condition, treatment plan, diagnostic tests, and medications will improve  Outcome: PROGRESSING AS EXPECTED  Intervention: Assess knowledge level of disease process/condition, treatment plan, diagnostic tests, and medications  Note: Patient's knowledge of plan of care, diagnostics, and medications regularly assessed. RN answers patient questions appropriately, education provided. Patient verbalizes better understanding of their condition.

## 2020-03-06 NOTE — ASSESSMENT & PLAN NOTE
Patient is on home O2 at baseline 2-1/2 to 3 L  He has had worsening shortness of breath over the last several weeks due to worsening pleural effusion  Pulmonology consulted

## 2020-03-06 NOTE — PROGRESS NOTES
Pt arrived to unit via gurney. Ambulated from [facility] to bed, via anne steady. Tele monitor applied, vitals taken. Pt assessed. A&O x4. Admit profile and med rec complete. Discussed POC with pt, including daily HD. Welcome folder provided and discussed. Communication board filled out. Questions and concerns addressed, verbalized understanding. Fall precautions in place. Pt demonstrates ability to use call light appropriately. Pt left in lowest position. Bed locked and low.

## 2020-03-06 NOTE — ASSESSMENT & PLAN NOTE
Pending insurance auth for TAVR at [facility][note 3]. Dr Godoy  High risk for decompensation

## 2020-03-06 NOTE — FLOWSHEET NOTE
03/06/20 0753   Events/Summary/Plan   Events/Summary/Plan Tx held, waiting for flovent and pt wants to eat breakfast   Therapy Not Performed   Type of Therapy Not Performed MDI   Reason Therapy Not Performed   (waiting for flovent and pt wants to eat breakfast)

## 2020-03-06 NOTE — FLOWSHEET NOTE
03/06/20 0831   Events/Summary/Plan   Events/Summary/Plan TX given   Interdisciplinary Plan of Care-Goals (Indications)   Bronchodilator Indications History / Diagnosis   Interdisciplinary Plan of Care-Outcomes    Bronchodilator Outcome Patient at Stable Baseline   Education   Education Yes - Pt. / Family has been Instructed in use of Respiratory Equipment   RT Assessment of Delivered Medications   Evaluation of Medication Delivery Daily Yes-- Pt /Family has been Instructed in use of Respiratory Medications and Adverse Reactions   MDI/DPI Group   $ MDI/DPI Given MDI/DPI x 2   Breath Sounds   RUL Breath Sounds Clear   RML Breath Sounds Diminished   RLL Breath Sounds Fine Crackles   SILVANA Breath Sounds Clear   LLL Breath Sounds Fine Crackles   Oximetry   $ Pulse Oximetry (Spot Check) Yes   Oxygen   O2 (LPM) 0   O2 Delivery Device None - Room Air

## 2020-03-06 NOTE — CONSULTS
Pulmonary  Consultation    Date of consult: 3/6/2020    Referring Physician  Abdirahman Schwarz M.D.    Reason for Consultation  Recurrent pleural effusion    History of Presenting Illness  79 y.o. male who presented 3/5/2020 with hx of ESRD on HD, hx of chronic anemia, ex smoker 40 pk yr quit 5 years ago, hyperlipidemia, coronary disease with a stent placed 1/2020 ago has been on aspirin and Plavix, recent hospitalization for anemia secondary to GI bleeding with unidentified source who presented 2/17/2020 with dark stool and weakness, severe aortic stenosis and being evaluated for TAVR by Dr. Godoy  He presented with right sided pleural effusion, cough  and sob and to have daily ultrafiltration until able to undergo TAVR.  Right sided thoracentesis showed serousanguinous  Wbc 234, rbc [id number]   (serum 187)  and glucose 87  Cxs negative  PH is 8    Based on LDH meets lights criteria for  exudative  Pathology pending     CXRs reviewed  3/5: he has b/l pleural effusions but right is worse than left  Pleural effusion has been there since 2/6 on the right but markedly worse on 3/5    Code Status  Full Code    Review of Systems  Review of Systems   Constitutional: Negative.    HENT: Negative.    Eyes: Negative.    Respiratory: Positive for shortness of breath.    Cardiovascular: Negative.    Gastrointestinal: Negative.    Genitourinary: Negative.    Musculoskeletal: Negative.    Skin: Negative.    Neurological: Negative.    Endo/Heme/Allergies: Negative.    Psychiatric/Behavioral: Negative.        Past Medical History   has a past medical history of Anesthesia, Anticoagulation monitoring, special range, Aortic stenosis, Arterial leg ulcer (HCC) (11/8/2018), Atrial flutter (HCC) (6/12/2019), Basal cell carcinoma of left cheek (3/26/2015), BPH (7/14/2009), Bronchitis (12/25/2018), CAD (coronary artery disease) (2/20/2014), CATARACT, CHF (congestive heart failure), NYHA class II, chronic, systolic (HCC) E F30 in setting  of atrial flutter (6/13/2019), Chronic respiratory failure with hypoxia ([facility]) (5/8/2016), CKD (chronic kidney disease) stage 4, GFR 15-29 ml/min ([facility]) (1/15/2010), COPD (chronic obstructive pulmonary disease) ([facility]), Detached retina, Dialysis, EMPHYSEMA, Glaucoma, Gout, Heart burn, Heart murmur, Hypertension, Indigestion, Kidney cyst, Leg pain, bilateral (8/10/2015), On supplemental oxygen therapy, Peripheral vascular disease ([facility]) (8/10/2015), Pneumonia, Primary insomnia (3/22/2018), Proteinuria, PVD (peripheral vascular disease) ([facility]), and Sleep apnea.    Surgical History   has a past surgical history that includes cataract phaco with iol (4/8/08); av fistula creation (2/12/2010); av fistula revision (2/19/2010); av fistulogram (7/23/2010); angioplasty balloon (7/23/2010); av fistulogram (9/17/2010); angioplasty balloon (9/17/2010); vitrectomy posterior (1/18/2011); scleral buckling (1/18/2011); recovery (8/12/2011); vitrectomy posterior (10/11/2011); recovery (7/23/2012); recovery (1/29/2013); recovery (7/2/2013); av fistula thrombolysis (7/2/2013); recovery (12/17/2013); recovery (3/24/2014); recovery (7/29/2014); recovery (3/24/2015); recovery (12/23/2015); gastroscopy with biopsy (8/13/2016); colonoscopy - endo (8/15/2016); endoscopy procedure (3/21/2018); femoral endarterectomy (Left, 1/25/2019); femoral popliteal bypass (Left, 1/25/2019); angiogram (Left, 1/25/2019); other orthopedic surgery (1998); av fistula creation (Right, 2/21/2019); lisa (6/13/2019); cardioversion (6/13/2019); av fistula creation (Right, 8/6/2019); cath placement (Right, 8/6/2019); stent placement (01/26/2020); gastroscopy-endo (2/7/2020); pr small bowel endoscopy,past 2nd duod (2/19/2020); colonoscopy - endo (2/19/2020); pr colonoscopy,diagnostic (2/19/2020); gastroscopy (2/19/2020); capsule endoscopy administration (N/A, 2/20/2020); capsule endoscopy retrieval (2/20/2020); pr colonoscopy,diagnostic (N/A, 2/23/2020); gastroscopy (N/A,  2/23/2020); pr colonoscopy,flex,w/control, bleeding (N/A, 2/24/2020); and gastroscopy w/push enterscopy (N/A, 2/24/2020).    Family History  family history includes Genitourinary () Problems in his maternal aunt; Hypertension in his brother and mother; Lung Disease in his father; Stroke in his mother.    Social History   reports that he quit smoking about 11 years ago. His smoking use included cigarettes. He has a 40.00 pack-year smoking history. He has never used smokeless tobacco. He reports that he does not drink alcohol or use drugs.    Medications  Home Medications     Reviewed by Mee Richard (Pharmacy Tech) on 03/05/20 at 0929  Med List Status: Complete   Medication Last Dose Status   albuterol 108 (90 Base) MCG/ACT Aero Soln inhalation aerosol 3/4/2020 Active   B Complex-C-Folic Acid (DIALYVITE TABLET) Tab 3/4/2020 Active   Calcium Acetate, Phos Binder, 667 MG Cap 3/4/2020 Active   clopidogrel (PLAVIX) 75 MG Tab 3/3/2020 Active   doxycycline (MONODOX) 100 MG capsule 3/5/2020 Active   fluticasone (FLONASE) 50 MCG/ACT nasal spray 3/5/2020 Active   Fluticasone-Umeclidin-Vilant (TRELEGY ELLIPTA) 100-62.5-25 MCG/INH AEROSOL POWDER, BREATH ACTIVATED 3/5/2020 Active   levalbuterol (XOPENEX) 1.25 MG/3ML Nebu Soln 3/4/2020 Active   montelukast (SINGULAIR) 10 MG Tab 3/3/2020 Active   omeprazole (PRILOSEC) 40 MG delayed-release capsule 3/5/2020 Active   predniSONE (DELTASONE) 20 MG Tab 3/5/2020 Active   ROPINIRole (REQUIP) 0.5 MG Tab 3/5/2020 Active   rosuvastatin (CRESTOR) 40 MG tablet 3/3/2020 Active   tamsulosin (FLOMAX) 0.4 MG capsule 3/4/2020 Active   torsemide (DEMADEX) 10 MG tablet 3/4/2020 Active   traZODone (DESYREL) 150 MG Tab 3/3/2020 Active              Current Facility-Administered Medications   Medication Dose Route Frequency Provider Last Rate Last Dose   • [START ON 3/7/2020] umeclidinium-vilanterol (ANORO ELLIPTA) inhaler 1 Puff  1 Puff Inhalation Daily-0600 Abdirahman Schwarz M.D.         And   • [START ON 3/7/2020] fluticasone (FLOVENT HFA) 44 MCG/ACT inhaler 88 mcg  2 Puff Inhalation Daily-0600 Abdirahman Schwarz M.D.       • Respiratory Therapy Consult   Nebulization Continuous RT Lucia Woods M.D.       • heparin injection 5,000 Units  5,000 Units Subcutaneous Q8HRS Lucia Woods M.D.   Stopped at 03/05/20 2200   • acetaminophen (TYLENOL) tablet 650 mg  650 mg Oral Q6HRS PRN Lucia Woods M.D.       • ondansetron (ZOFRAN) syringe/vial injection 4 mg  4 mg Intravenous Q4HRS PRN Lucia Woods M.D.       • ondansetron (ZOFRAN ODT) dispertab 4 mg  4 mg Oral Q4HRS PRN Lucia Woods M.D.       • calcium acetate (PHOS-LO) capsule 1,334 mg  1,334 mg Oral TID AC Lucia Woods M.D.   1,334 mg at 03/06/20 1758   • clopidogrel (PLAVIX) tablet 75 mg  75 mg Oral Q EVENING Lucia Woods M.D.   75 mg at 03/06/20 1758   • folic acid 1 mg-vit B complex (NEPHROCAPS) 1 mg  1 Cap Oral DAILY Lucia Woods M.D.   1 Cap at 03/06/20 0530   • doxycycline monohydrate (ADOXA) tablet 100 mg  100 mg Oral Q12HRS Lucia Woods M.D.   100 mg at 03/06/20 0530   • fluticasone (FLONASE) nasal spray  mcg  1-2 Spray Nasal DAILY Lucia Woods M.D.   100 mcg at 03/06/20 0531   • levalbuterol (XOPENEX) 1.25 MG/3ML nebulizer solution 1.25 mg  1.25 mg Nebulization Q4HRS PRN Lucia Woods M.D.   1.25 mg at 03/05/20 1732   • montelukast (SINGULAIR) tablet 10 mg  10 mg Oral Q EVENING Lucia Woods M.D.   10 mg at 03/06/20 1758   • omeprazole (PRILOSEC) capsule 40 mg  40 mg Oral DAILY Lucia Woods M.D.   40 mg at 03/06/20 0530   • predniSONE (DELTASONE) tablet 60 mg  60 mg Oral DAILY Lucia Woods M.D.   60 mg at 03/06/20 0531   • ROPINIRole (REQUIP) tablet 1 mg  1 mg Oral QHS Lucia Woods M.D.   1 mg at 03/05/20 2125   • rosuvastatin (CRESTOR) tablet 40 mg  40 mg Oral Q EVENING Lucia Woods M.D.   40 mg at  03/06/20 1758   • tamsulosin (FLOMAX) capsule 0.8 mg  0.8 mg Oral DAILY Lucia Woods M.D.   0.8 mg at 03/06/20 0531   • torsemide (DEMADEX) TABS 30 mg  30 mg Oral Q DAY Lucia Woods M.D.   30 mg at 03/06/20 0530   • traZODone (DESYREL) tablet 150-300 mg  150-300 mg Oral QHS Lucia Woods M.D.   300 mg at 03/05/20 2125   • lidocaine (XYLOCAINE) 1 % injection 1 mL  1 mL Intradermal PRN Amena Guerra M.D.   1 mL at 03/06/20 0918       Allergies  No Known Allergies    Vital Signs last 24 hours  Temp:  [36.3 °C (97.4 °F)-36.6 °C (97.8 °F)] 36.3 °C (97.4 °F)  Pulse:  [] 96  Resp:  [16-20] 18  BP: (102-143)/(33-68) 113/49  SpO2:  [92 %-99 %] 98 %    Physical Exam  Physical Exam  HENT:      Nose: Nose normal.   Eyes:      Extraocular Movements: Extraocular movements intact.      Pupils: Pupils are equal, round, and reactive to light.   Neck:      Musculoskeletal: Normal range of motion.   Cardiovascular:      Rate and Rhythm: Normal rate.      Heart sounds: Murmur present.   Pulmonary:      Effort: Pulmonary effort is normal.      Breath sounds: Normal breath sounds.      Comments: Diminished at right base and insp squeaks  Musculoskeletal: Normal range of motion.   Skin:     General: Skin is warm.      Comments: Hyperpigmentation LE   Neurological:      General: No focal deficit present.      Mental Status: He is alert.   Psychiatric:         Mood and Affect: Mood normal.         Fluids    Intake/Output Summary (Last 24 hours) at 3/6/2020 1903  Last data filed at 3/6/2020 1800  Gross per 24 hour   Intake 1020 ml   Output 5950 ml   Net -4930 ml       Laboratory  .  Labs:  Recent Labs     03/04/20  1600 03/05/20  0906   WBC 5.3 7.1   RBC 2.19* 2.44*   HEMOGLOBIN 6.7* 7.5*   HEMATOCRIT 23.2* 25.6*   .9* 104.9*   MCH 30.6 30.7   MCHC 28.9* 29.3*   RDW 69.6* 68.1*   PLATELETCT 211 205   MPV 10.4 9.8     Recent Labs     03/05/20  0906   INR 1.09             Recent Labs     03/05/20  0906    SODIUM 139   POTASSIUM 4.9   CHLORIDE 97   CO2 25   GLUCOSE 101*   BUN 50*     Recent Labs     03/05/20  0906   SODIUM 139   POTASSIUM 4.9   CHLORIDE 97   CO2 25   BUN 50*   CREATININE 6.94*   CALCIUM 10.1     Results for orders placed or performed during the hospital encounter of 08/07/18   Echocardiogram Comp w/o Cont   Result Value Ref Range    Eject.Frac. MOD BP 46.98     Eject.Frac. MOD 4C 48.42     Eject.Frac. MOD 2C 51.76     Left Ventrical Ejection Fraction 55          Imaging:   DX-CHEST-PORTABLE (1 VIEW)   Final Result      No pneumothorax post right-sided thoracentesis.      US-THORACENTESIS PUNCTURE RIGHT   Final Result      1. Ultrasound guided right sided diagnostic and therapeutic thoracentesis.      2. 1640 mL of fluid withdrawn.      EC-ECHOCARDIOGRAM COMPLETE W/O CONT         DX-CHEST-PORTABLE (1 VIEW)   Final Result      New large right subpulmonic pleural effusion causes multisegmental atelectasis          Imaging  As above    Assessment/Plan  Pleural effusion- (present on admission)  Assessment & Plan  b/l but right worse much larger than left  S/p thoracentesis - bloody and exudative by LDH criteria  Pathology ending  Glucose wnl  cxs negative  This effusion may or may not be related to the severity of his AS and heart failure  He has an extensive smoking hx and puts him at risk for lung ca    He is undergoing daily ultrafiltration and if does not reoccur would support heart failure  Would awaiting pathology and reassess with cxr in 48 hrs for better assessment    D/w patient

## 2020-03-06 NOTE — PROGRESS NOTES
[facility] Progress Note     Hemodialysis treatment ordered today per Dr. Guerra x 3.5 hours.   Treatment initiated at 0920, ended at 1250.      Patient tolerated treatment; see paper flow sheet for details.      Net UF 2,000 mL.      LUE AVF site with swelling and small bruising from previous infiltration per pt report. Nephrologist updated.    Needles removed from access site. Dressings applied and sites held x 10 minutes; verified no bleeding. Positive bruit/thrill post tx. Staff RN to monitor AVF for breakthrough bleeding. Should breakthrough bleeding occur, staff RN to apply pressure to access sites until bleeding resolved. Notify Dialysis and Nephrologist for follow-up.     Report given to Primary RN.

## 2020-03-06 NOTE — PROGRESS NOTES
"LT UA AVF swelling from previous infiltrations. Arterial needle used 15 G 1\" sharp needle without difficulties. Venous site tried with 15 G 1\" sharp x 2 unable to flushed. Use 7 G 1\" sharp up higher on AVF without difficulties.   "

## 2020-03-06 NOTE — CARE PLAN
Problem: Communication  Goal: The ability to communicate needs accurately and effectively will improve  Outcome: PROGRESSING AS EXPECTED  Intervention: [place] patient and significant other/support system to call light to alert staff of needs  Flowsheets (Taken 3/5/2020 1952)  Oriented to:: All of the Following : Location of Bathroom, Visiting Policy, Unit Routine, Call Light and Bedside Controls, Bedside Rail Policy, Smoking Policy, Rights and Responsibilities, Bedside Report, and Patient Education Notebook     Problem: Safety  Goal: Will remain free from falls  Outcome: PROGRESSING AS EXPECTED  Intervention: Implement fall precautions  Flowsheets (Taken 3/5/2020 1945)  Environmental Precautions:   Treaded Slipper Socks on Patient   Personal Belongings, Wastebasket, Call Bell etc. in Easy Reach   Transferred to Stronger Side   Report Given to Other Health Care Providers Regarding Fall Risk   Bed in Low Position   Communication Sign for Patients & Families   Mobility Assessed & Appropriate Sign Placed

## 2020-03-06 NOTE — ASSESSMENT & PLAN NOTE
Patient normally receives Monday Wednesday Friday hemodialysis  Cardiology is recommending daily ultrafiltration to remove the fluid until the patient can be transferred to [place] for TAVR (pending insurance Auth)

## 2020-03-06 NOTE — FLOWSHEET NOTE
03/05/20 1733   Events/Summary/Plan   Events/Summary/Plan PRN tx given   Vital Signs   Pulse 91   Respiration 18   Pulse Oximetry 94 %   $ Pulse Oximetry (Spot Check) Yes   Respiratory Assessment   Level of Consciousness Alert   Breath Sounds   RUL Breath Sounds Rhonchi   RML Breath Sounds Rhonchi   RLL Breath Sounds Diminished;Rhonchi   SILVANA Breath Sounds Rhonchi   LLL Breath Sounds Diminished;Rhonchi   Secretions   Cough Non Productive;Congested   Oxygen   O2 (LPM) 0   O2 Delivery Device None - Room Air

## 2020-03-06 NOTE — PROGRESS NOTES
Report received from ROCHELLE Summers. Plan of care discussed. Patient resting comfortably in bed, declines any further needs at this time. Safety precautions in place.

## 2020-03-06 NOTE — PROGRESS NOTES
Telemetry Shift Summary    Rhythm SR/ST w/ BBB  HR Range 90s-100s  Ectopy freq PACs, occ PVCs  Measurements 0.20/0.12/0.36        Normal Values  Rhythm SR  HR Range    Measurements 0.12-0.20 / 0.06-0.10  / 0.30-0.52

## 2020-03-06 NOTE — PROGRESS NOTES
[facility] Progress Note     PUF treatment ordered today per Dr. Guerra x 2 hours.   Treatment initiated at 2032 ended at 2232.      Cramping toward end of session; see paper flow sheet for details.      Net UF 3,000 mL.      Needles removed from access site. Dressings applied and sites held x 10 minutes; verified no bleeding. Positive bruit/thrill post tx. Staff RN to monitor AVF/G for breakthrough bleeding. Should breakthrough bleeding occur, staff RN to apply pressure to access sites until bleeding resolved. Notify Dialysis and Nephrologist for follow-up.     Report given to Primary RN.

## 2020-03-06 NOTE — PROGRESS NOTES
Nephrology/Hemodialysis note    Patient with ESRD/HD admitted with SOB  Seen and examined during -tolerates well  VS stable  Lab results reviewed  Please see dialysis flow sheet for details

## 2020-03-06 NOTE — H&P
Hospital Medicine History & Physical Note    Date of Service  3/5/2020    Primary Care Physician  Martha Light M.D.    Consultants  Pulmonary- Dania  Renal- Nyk  Cardiology- Gisela    Code Status  Full    Chief Complaint  SOB  abnormal labs    History of Presenting Illness  79 y.o. male who presented 3/5/2020 with worsening shortness of breath.  He has been having increasing shortness of breath since December.  He is being worked up for severe aortic stenosis and is to have the procedure this month at [facility] pending insurance authorization.  He follows with Dr. Godoy there.  He has been having recurrent issues with pleural effusion on the right, this is been getting steadily worse over the last several days and yesterday he had lab work done at dialysis and was told to come to the hospital today.  Patient overall is a poor historian and very inattentive.  Patient was discussed with consulting cardiologist as the patient's cardiology group does not come to this facility.  He is recommending that patient undergo ultrafiltration daily until he is able to undergo his TAVR procedure.    The patient has cough which is nonproductive, he denies chest pain, he has progressive shortness of breath and dyspnea on exertion, he has orthopnea and prefers to sit on the edge of the bed.  He has had no pedal edema.  He makes very little urine.  He denies fever chills or upper respiratory complaints.  His appetite has been normal he has no nausea or vomiting or change in his bowel habit.    Review of Systems  Review of Systems   Constitutional: Negative for chills and fever.   HENT: Negative for congestion and sore throat.    Eyes: Negative for discharge and redness.   Respiratory: Positive for shortness of breath.    Cardiovascular: Positive for orthopnea. Negative for chest pain, palpitations and leg swelling.   Gastrointestinal: Negative for abdominal pain, constipation, diarrhea, nausea and vomiting.    Genitourinary:        Oliguric   Musculoskeletal: Negative for joint pain.   Skin: Negative for itching and rash.   Neurological: Negative for dizziness, weakness and headaches.   Psychiatric/Behavioral: The patient is nervous/anxious.        Past Medical History   has a past medical history of Anesthesia, Anticoagulation monitoring, special range, Aortic stenosis, Arterial leg ulcer ([facility]) (11/8/2018), Atrial flutter ([facility]) (6/12/2019), Basal cell carcinoma of left cheek (3/26/2015), BPH (7/14/2009), Bronchitis (12/25/2018), CAD (coronary artery disease) (2/20/2014), CATARACT, CHF (congestive heart failure), NYHA class II, chronic, systolic ([facility]) E F30 in setting of atrial flutter (6/13/2019), Chronic respiratory failure with hypoxia ([facility]) (5/8/2016), CKD (chronic kidney disease) stage 4, GFR 15-29 ml/min ([facility]) (1/15/2010), COPD (chronic obstructive pulmonary disease) ([facility]), Detached retina, Dialysis, EMPHYSEMA, Glaucoma, Gout, Heart burn, Heart murmur, Hypertension, Indigestion, Kidney cyst, Leg pain, bilateral (8/10/2015), On supplemental oxygen therapy, Peripheral vascular disease ([facility]) (8/10/2015), Pneumonia, Primary insomnia (3/22/2018), Proteinuria, PVD (peripheral vascular disease) ([facility]), and Sleep apnea.    Surgical History   has a past surgical history that includes cataract phaco with iol (4/8/08); av fistula creation (2/12/2010); av fistula revision (2/19/2010); av fistulogram (7/23/2010); angioplasty balloon (7/23/2010); av fistulogram (9/17/2010); angioplasty balloon (9/17/2010); vitrectomy posterior (1/18/2011); scleral buckling (1/18/2011); recovery (8/12/2011); vitrectomy posterior (10/11/2011); recovery (7/23/2012); recovery (1/29/2013); recovery (7/2/2013); av fistula thrombolysis (7/2/2013); recovery (12/17/2013); recovery (3/24/2014); recovery (7/29/2014); recovery (3/24/2015); recovery (12/23/2015); gastroscopy with biopsy (8/13/2016); colonoscopy - endo (8/15/2016); endoscopy procedure  (3/21/2018); femoral endarterectomy (Left, 1/25/2019); femoral popliteal bypass (Left, 1/25/2019); angiogram (Left, 1/25/2019); other orthopedic surgery (1998); av fistula creation (Right, 2/21/2019); lisa (6/13/2019); cardioversion (6/13/2019); av fistula creation (Right, 8/6/2019); cath placement (Right, 8/6/2019); stent placement (01/26/2020); gastroscopy-endo (2/7/2020); pr small bowel endoscopy,past 2nd duod (2/19/2020); colonoscopy - endo (2/19/2020); pr colonoscopy,diagnostic (2/19/2020); gastroscopy (2/19/2020); capsule endoscopy administration (N/A, 2/20/2020); capsule endoscopy retrieval (2/20/2020); pr colonoscopy,diagnostic (N/A, 2/23/2020); gastroscopy (N/A, 2/23/2020); pr colonoscopy,flex,w/control, bleeding (N/A, 2/24/2020); and gastroscopy w/push enterscopy (N/A, 2/24/2020). did not mention most of these    Family History  family history includes Genitourinary () Problems in his maternal aunt; Hypertension in his brother and mother; Lung Disease in his father; Stroke in his mother. various cancers, Heart disease, HTN. No [facility] DM    Social History   reports that he quit smoking about 11 years ago. His smoking use included cigarettes. He has a 40.00 pack-year smoking history. He has never used smokeless tobacco. He reports that he does not drink alcohol or use drugs. Lives with spouse, 2 sons live locally.    Allergies  No Known Allergies    Medications  Prior to Admission Medications   Prescriptions Last Dose Informant Patient Reported? Taking?   B Complex-C-Folic Acid (DIALYVITE TABLET) Tab 3/4/2020 at 0700 Family Member No No   Sig: TAKE ONE TABLET BY MOUTH DAILY   Calcium Acetate, Phos Binder, 667 MG Cap 3/4/2020 at 1400 Family Member No No   Sig: TAKE 3 CAPSULES BY MOUTH WITH MEALS (3 MEALS) AND 2 CAPSULES WITH SNACKS (2 SNACKS)   Fluticasone-Umeclidin-Vilant (TRELEGY ELLIPTA) 100-62.5-25 MCG/INH AEROSOL POWDER, BREATH ACTIVATED 3/5/2020 at 0700 Family Member No No   Sig: Inhale 1 Inhaler by mouth  every day.   ROPINIRole (REQUIP) 0.5 MG Tab 3/5/2020 at 0700 Family Member No No   Sig: TAKE TWO TABLETS BY MOUTH DAILY   albuterol 108 (90 Base) MCG/ACT Aero Soln inhalation aerosol 3/4/2020 at PRN Family Member No No   Sig: Inhale 2 Puffs by mouth every four hours as needed for Shortness of Breath.   clopidogrel (PLAVIX) 75 MG Tab 3/3/2020 at PRN Family Member Yes No   Sig: Take 75 mg by mouth every evening.   doxycycline (MONODOX) 100 MG capsule 3/5/2020 at 0700 Family Member No No   Sig: Take 1 Cap by mouth 2 times a day for 10 days.   fluticasone (FLONASE) 50 MCG/ACT nasal spray 3/5/2020 at 0700 Family Member No No   Sig: Spray 1-2 Sprays in nose every day. Each nostril.   levalbuterol (XOPENEX) 1.25 MG/3ML Nebu Soln 3/4/2020 at PRN Family Member No No   Sig: 3 mL by Nebulization route every four hours as needed for Shortness of Breath.   montelukast (SINGULAIR) 10 MG Tab 3/3/2020 at PM Family Member No No   Sig: Take 1 Tab by mouth every evening.   omeprazole (PRILOSEC) 40 MG delayed-release capsule 3/5/2020 at 0700 Family Member No No   Sig: TAKE ONE CAPSULE BY MOUTH DAILY   predniSONE (DELTASONE) 20 MG Tab 3/5/2020 at 0700 Family Member No No   Sig: Take 3 Tabs by mouth every day for 5 days.   rosuvastatin (CRESTOR) 40 MG tablet 3/3/2020 at PM Family Member No No   Sig: Take 1 Tab by mouth every evening.   tamsulosin (FLOMAX) 0.4 MG capsule 3/4/2020 at 0700 Family Member Yes No   Sig: Take 0.8 mg by mouth every day.   torsemide (DEMADEX) 10 MG tablet 3/4/2020 at 0700 Family Member No No   Sig: TAKE THREE TABLETS BY MOUTH DAILY   traZODone (DESYREL) 150 MG Tab 3/3/2020 at PM Family Member Yes No   Sig: Take 150-300 mg by mouth every bedtime.      Facility-Administered Medications: None       Physical Exam  Temp:  [36.4 °C (97.6 °F)-36.8 °C (98.3 °F)] 36.4 °C (97.6 °F)  Pulse:  [] 91  Resp:  [16-22] 18  BP: (143-162)/(64-80) 153/64  SpO2:  [94 %-99 %] 94 %    Physical Exam  Vitals signs and nursing note  reviewed.   Constitutional:       Appearance: He is normal weight.   HENT:      Head: Normocephalic and atraumatic.      Right Ear: External ear normal.      Left Ear: External ear normal.      Nose: Nose normal.      Mouth/Throat:      Mouth: Mucous membranes are moist.      Pharynx: Oropharynx is clear.   Eyes:      Extraocular Movements: Extraocular movements intact.      Conjunctiva/sclera: Conjunctivae normal.      Pupils: Pupils are equal, round, and reactive to light.   Cardiovascular:      Rate and Rhythm: Normal rate and regular rhythm.      Heart sounds: Murmur present.   Pulmonary:      Effort: Pulmonary effort is normal. No respiratory distress.      Comments: Diminished breath sounds below right scapula, mild rhonchi above right scapula left lung clear  Abdominal:      General: Abdomen is flat. Bowel sounds are normal. There is no distension.      Palpations: Abdomen is soft.      Tenderness: There is no abdominal tenderness.   Musculoskeletal:      Right lower leg: No edema.      Left lower leg: No edema.      Comments: Fistula with surrounding soft tissue swelling right proximal arm   Skin:     General: Skin is warm and dry.      Comments: Coppery hyperpigmentation likely secondary to chronic HD   Neurological:      General: No focal deficit present.      Mental Status: He is alert and oriented to person, place, and time. Mental status is at baseline.   Psychiatric:      Comments: He is anxious and inattentive         Laboratory:  Recent Labs     03/04/20  1600 03/05/20  0906   WBC 5.3 7.1   RBC 2.19* 2.44*   HEMOGLOBIN 6.7* 7.5*   HEMATOCRIT 23.2* 25.6*   .9* 104.9*   MCH 30.6 30.7   MCHC 28.9* 29.3*   RDW 69.6* 68.1*   PLATELETCT 211 205   MPV 10.4 9.8     Recent Labs     03/05/20  0906   SODIUM 139   POTASSIUM 4.9   CHLORIDE 97   CO2 25   GLUCOSE 101*   BUN 50*   CREATININE 6.94*   CALCIUM 10.1     Recent Labs     03/05/20  0906   ALTSGPT 16   ASTSGOT 17   ALKPHOSPHAT 121*   TBILIRUBIN 0.4    GLUCOSE 101*     Recent Labs     03/05/20  0906   INR 1.09     Recent Labs     03/05/20  0906   NTPROBNP >90190*         No results for input(s): TROPONINT in the last 72 hours.    Urinalysis:    No results found     Imaging:  DX-CHEST-PORTABLE (1 VIEW)   Final Result      No pneumothorax post right-sided thoracentesis.      US-THORACENTESIS PUNCTURE RIGHT   Final Result      1. Ultrasound guided right sided diagnostic and therapeutic thoracentesis.      2. 1640 mL of fluid withdrawn.      EC-ECHOCARDIOGRAM COMPLETE W/O CONT         DX-CHEST-PORTABLE (1 VIEW)   Final Result      New large right subpulmonic pleural effusion causes multisegmental atelectasis            Assessment/Plan:  I anticipate this patient will require at least two midnights for appropriate medical management, necessitating inpatient admission.    Transudative pleural effusion- (present on admission)  Assessment & Plan  Can only remove 500cc 2/2 AS  Probably has >2L     Severe aortic stenosis- (present on admission)  Assessment & Plan  Pending insurance auth for TAVR at [facility]. Dr Godoy    ESRD (end stage renal disease) on dialysis ([facility])- (present on admission)  Assessment & Plan  Patient normally receives Monday Wednesday Friday hemodialysis  He feels he is close to his dry weight  Cardiology is recommending daily ultrafiltration to remove the fluid until the patient can be transferred to [place] for TAVR (pending insurance Auth)    Acute on chronic respiratory failure with hypoxemia ([facility])  Assessment & Plan  Patient is on home O2 at baseline 2-1/2 to 3 L  He has had worsening shortness of breath over the last several weeks due to worsening pleural effusion    He is asking for pulmonary consult even though I have explained to him that the fluid in the lung is due to his heart issues    Anemia in CKD (chronic kidney disease)- (present on admission)  Assessment & Plan  Stable    COPD (chronic obstructive pulmonary disease) ([facility])- (present  on admission)  Assessment & Plan  RT protocol has been ordered        VTE prophylaxis: heparin

## 2020-03-06 NOTE — PROGRESS NOTES
Received report from Donny BYRD, assessment completed as patient eats breakfast at the edge of bed, no complaints this morning. Dialysis to be conducted this morning. Verbalizes a decrease in SOB since right thoracentesis yesterday. Call light within reach.

## 2020-03-07 VITALS
RESPIRATION RATE: 18 BRPM | SYSTOLIC BLOOD PRESSURE: 128 MMHG | HEART RATE: 98 BPM | TEMPERATURE: 97.7 F | OXYGEN SATURATION: 94 % | HEIGHT: 68 IN | BODY MASS INDEX: 24.32 KG/M2 | WEIGHT: 160.5 LBS | DIASTOLIC BLOOD PRESSURE: 53 MMHG

## 2020-03-07 LAB
ERYTHROCYTE [DISTWIDTH] IN BLOOD BY AUTOMATED COUNT: 67.8 FL (ref 35.9–50)
HCT VFR BLD AUTO: 27.6 % (ref 42–52)
HGB BLD-MCNC: 8.2 G/DL (ref 14–18)
MCH RBC QN AUTO: 30.7 PG (ref 27–33)
MCHC RBC AUTO-ENTMCNC: 29.7 G/DL (ref 33.7–35.3)
MCV RBC AUTO: 103.4 FL (ref 81.4–97.8)
PLATELET # BLD AUTO: 222 K/UL (ref 164–446)
PMV BLD AUTO: 9.9 FL (ref 9–12.9)
RBC # BLD AUTO: 2.67 M/UL (ref 4.7–6.1)
WBC # BLD AUTO: 6.8 K/UL (ref 4.8–10.8)

## 2020-03-07 PROCEDURE — 700102 HCHG RX REV CODE 250 W/ 637 OVERRIDE(OP): Performed by: HOSPITALIST

## 2020-03-07 PROCEDURE — 85027 COMPLETE CBC AUTOMATED: CPT

## 2020-03-07 PROCEDURE — 99239 HOSP IP/OBS DSCHRG MGMT >30: CPT | Performed by: HOSPITALIST

## 2020-03-07 PROCEDURE — 700111 HCHG RX REV CODE 636 W/ 250 OVERRIDE (IP): Performed by: INTERNAL MEDICINE

## 2020-03-07 PROCEDURE — 700102 HCHG RX REV CODE 250 W/ 637 OVERRIDE(OP): Performed by: INTERNAL MEDICINE

## 2020-03-07 PROCEDURE — A9270 NON-COVERED ITEM OR SERVICE: HCPCS | Performed by: HOSPITALIST

## 2020-03-07 PROCEDURE — A9270 NON-COVERED ITEM OR SERVICE: HCPCS | Performed by: INTERNAL MEDICINE

## 2020-03-07 RX ADMIN — FLUTICASONE PROPIONATE 100 MCG: 50 SPRAY, METERED NASAL at 06:09

## 2020-03-07 RX ADMIN — NEPHROCAP 1 CAPSULE: 1 CAP ORAL at 06:08

## 2020-03-07 RX ADMIN — CALCIUM ACETATE 1334 MG: 667 CAPSULE ORAL at 08:06

## 2020-03-07 RX ADMIN — TAMSULOSIN HYDROCHLORIDE 0.8 MG: 0.4 CAPSULE ORAL at 06:07

## 2020-03-07 RX ADMIN — UMECLIDINIUM BROMIDE AND VILANTEROL TRIFENATATE 1 PUFF: 62.5; 25 POWDER RESPIRATORY (INHALATION) at 06:09

## 2020-03-07 RX ADMIN — CALCIUM ACETATE 1334 MG: 667 CAPSULE ORAL at 12:55

## 2020-03-07 RX ADMIN — FLUTICASONE PROPIONATE 88 MCG: 44 AEROSOL, METERED RESPIRATORY (INHALATION) at 06:09

## 2020-03-07 RX ADMIN — TORSEMIDE 30 MG: 10 TABLET ORAL at 06:05

## 2020-03-07 RX ADMIN — PREDNISONE 60 MG: 10 TABLET ORAL at 06:08

## 2020-03-07 RX ADMIN — OMEPRAZOLE 40 MG: 20 CAPSULE, DELAYED RELEASE ORAL at 06:08

## 2020-03-07 ASSESSMENT — FIBROSIS 4 INDEX: FIB4 SCORE: 1.6

## 2020-03-07 NOTE — PROGRESS NOTES
Telemetry Shift Summary    Rhythm SR/ST w/ BBB  HR Range 80s-100s  Ectopy freq PVCs, rare trigeminy   Measurements 0.16/0.12/0.34        Normal Values  Rhythm SR  HR Range    Measurements 0.12-0.20 / 0.06-0.10  / 0.30-0.52

## 2020-03-07 NOTE — PROGRESS NOTES
Assessment completed, patient is alert and oriented x 4, patient declines any pain at this time. Assisted patient with clean gown and ice has been provided. Safety precautions in place.

## 2020-03-07 NOTE — CARE PLAN
Problem: Knowledge Deficit  Goal: Knowledge of disease process/condition, treatment plan, diagnostic tests, and medications will improve  Outcome: PROGRESSING AS EXPECTED  Discussed plan of care with patient including medications administered, lab draw and safety precautions. Allowed time for questions, patient agreed and verbalized understanding.     Problem: Venous Thromboembolism (VTW)/Deep Vein Thrombosis (DVT) Prevention:  Goal: Patient will participate in Venous Thrombosis (VTE)/Deep Vein Thrombosis (DVT)Prevention Measures  Outcome: PROGRESSING AS EXPECTED   Patient on heparin, refusing but is ambulatory.

## 2020-03-07 NOTE — DISCHARGE INSTRUCTIONS
Discharge Instructions    Discharged to home by car with relative. Discharged via wheelchair, hospital escort: Yes.  Special equipment needed: Not Applicable    Be sure to schedule a follow-up appointment with your primary care doctor or any specialists as instructed.     Discharge Plan:   Diet Plan: Discussed  Activity Level: Discussed  Confirmed Follow up Appointment: Patient to Call and Schedule Appointment  Confirmed Symptoms Management: Discussed  Medication Reconciliation Updated: Yes  Influenza Vaccine Indication: Not indicated: Previously immunized this influenza season and > 8 years of age    I understand that a diet low in cholesterol, fat, and sodium is recommended for good health. Unless I have been given specific instructions below for another diet, I accept this instruction as my diet prescription.   Other diet: Renal    Special Instructions: None    · Is patient discharged on Warfarin / Coumadin?   No       Pleural Effusion  A pleural effusion is an abnormal buildup of fluid in the layers of tissue between your lungs and the inside of your chest (pleural space). These two layers of tissue that line both your lungs and the inside of your chest are called pleura. Usually, there is no air in the space between the pleura, only a thin layer of fluid. If left untreated, a large amount of fluid can build up and cause the lung to collapse. A pleural effusion is usually caused by another disease that requires treatment.  The two main types of pleural effusion are:  · Transudative pleural effusion. This happens when fluid leaks into the pleural space because of a low protein count in your blood or high blood pressure in your vessels. Heart failure often causes this.  · Exudative infusion. This occurs when fluid collects in the pleural space from blocked blood vessels or lymph vessels. Some lung diseases, injuries, and cancers can cause this type of effusion.  What are the causes?  Pleural effusion can be caused  by:  · Heart failure.  · A blood clot in the lung (pulmonary embolism).  · Pneumonia.  · Cancer.  · Liver failure (cirrhosis).  · Kidney disease.  · Complications from surgery, such as from open heart surgery.  What are the signs or symptoms?  In some cases, pleural effusion may cause no symptoms. Symptoms can include:  · Shortness of breath, especially when lying down.  · Chest pain, often worse when taking a deep breath.  · Fever.  · Dry cough that is lasting (chronic).  · Hiccups.  · Rapid breathing.  An underlying condition that is causing the pleural effusion (such as heart failure, pneumonia, blood clots, tuberculosis, or cancer) may also cause additional symptoms.  How is this diagnosed?  Your health care provider may suspect pleural effusion based on your symptoms and medical history. Your health care provider will also do a physical exam and a chest X-ray. If the X-ray shows there is fluid in your chest, you may need to have this fluid removed using a needle (thoracentesis) so it can be tested.  You may also have:  · Imaging studies of the chest, such as:  ¨ Ultrasound.  ¨ CT scan.  · Blood tests for kidney and liver function.  How is this treated?  Treatment depends on the cause of the pleural effusion. Treatment may include:  · Taking antibiotic medicines to clear up an infection that is causing the pleural effusion.  · Placing a tube in the chest to drain the effusion (tube thoracostomy). This procedure is often used when there is an infection in the fluid.  · Surgery to remove the fibrous outer layer of tissue from the pleural space (decortication).  · Thoracentesis, which can improve cough and shortness of breath.  · A procedure to put medicine into the chest cavity to seal the pleural space to prevent fluid buildup (pleurodesis).  · Chemotherapy and radiation therapy. These may be required in the case of cancerous (malignant) pleural effusion.  Follow these instructions at home:  · Take medicines only  as directed by your health care provider.  · Keep track of how long you can gently exercise before you get short of breath. Try simply walking at first.  · Do not use any tobacco products, including cigarettes, chewing tobacco, or electronic cigarettes. If you need help quitting, ask your health care provider.  · Keep all follow-up visits as directed by your health care provider. This is important.  Contact a health care provider if:  · The amount of time that you are able to exercise decreases or does not improve with time.  · You have pain or signs of infection at the puncture site if you had thoracentesis. Watch for:  ¨ Drainage.  ¨ Redness.  ¨ Swelling.  · You have a fever.  Get help right away if:  · You are short of breath.  · You develop chest pain.  · You develop a new cough.  This information is not intended to replace advice given to you by your health care provider. Make sure you discuss any questions you have with your health care provider.  Document Released: 12/18/2006 Document Revised: 05/22/2017 Document Reviewed: 05/13/2015  [employer] Interactive Patient Education © 2017 [employer]      Depression / Suicide Risk    As you are discharged from this [facility] facility, it is important to learn how to keep safe from harming yourself.    Recognize the warning signs:  · Abrupt changes in personality, positive or negative- including increase in energy   · Giving away possessions  · Change in eating patterns- significant weight changes-  positive or negative  · Change in sleeping patterns- unable to sleep or sleeping all the time   · Unwillingness or inability to communicate  · Depression  · Unusual sadness, discouragement and loneliness  · Talk of wanting to die  · Neglect of personal appearance   · Rebelliousness- reckless behavior  · Withdrawal from people/activities they love  · Confusion- inability to concentrate     If you or a loved one observes any of these behaviors or has concerns about  self-harm, here's what you can do:  · Talk about it- your feelings and reasons for harming yourself  · Remove any means that you might use to hurt yourself (examples: pills, rope, extension cords, firearm)  · Get professional help from the community (Mental Health, Substance Abuse, psychological counseling)  · Do not be alone:Call your Safe Contact- someone whom you trust who will be there for you.  · Call your local CRISIS HOTLINE 415-9443 or 252-190-2618  · Call your local Children's Mobile Crisis Response Team [place] (168) 672-8133 or www.Userscout  · Call the toll free National Suicide Prevention Hotlines   · National Suicide Prevention Lifeline 269-572-YYBN (0862)  · National Hope Line Network 800-SUICIDE (167-5857)

## 2020-03-07 NOTE — DISCHARGE SUMMARY
"Discharge Summary    CHIEF COMPLAINT ON ADMISSION  Chief Complaint   Patient presents with   • Abnormal Labs     Pt had dialysis and labs done and was told he had low Hemaglobin; told to come to ER for transfusion   • Sent by MD   • Weakness       Reason for Admission  Abnormal Labs     Admission Date  3/5/2020    CODE STATUS  Prior    HPI & HOSPITAL COURSE  Per HPI:  \"79 y.o. male who presented 3/5/2020 with worsening shortness of breath.  He has been having increasing shortness of breath since December.  He is being worked up for severe aortic stenosis and is to have the procedure this month at [facility] pending insurance authorization.  He follows with Dr. Godoy there.  He has been having recurrent issues with pleural effusion on the right, this is been getting steadily worse over the last several days and yesterday he had lab work done at dialysis and was told to come to the hospital today.      Had thoracentesis.  Dialysis with ultrafiltration ongoing.  At time of discharge effusion appears to be exudative based on LDH criteria.  Pulmonology was consulted who recommended interval chest x-ray this upcoming week, pulmonary clinic will contact patient to schedule.  After patient discharge, cytology negative for malignant cells.  Patient had 40-year pack year history with bloody effusion so malignancy was certainly in the differential.  He is to continue dialysis as outpatient and follow-up with [place] for further discussion about TAVR planning.       Therefore, he is discharged in fair and stable condition to home with close outpatient follow-up.    The patient met 2-midnight criteria for an inpatient stay at the time of discharge.    Discharge Date  3/7/20    FOLLOW UP ITEMS POST DISCHARGE  Follow-up cardiology and pulmonology  Continue dialysis    DISCHARGE DIAGNOSES  Active Problems:    Transudative pleural effusion POA: Yes    ESRD (end stage renal disease) on dialysis (HCC) POA: Yes    Essential " hypertension POA: Yes    Severe aortic stenosis POA: Yes    Acute on chronic respiratory failure with hypoxemia (HCC) POA: Unknown    Pleural effusion POA: Yes    COPD (chronic obstructive pulmonary disease) (HCC) POA: Yes    Anemia in CKD (chronic kidney disease) POA: Yes  Resolved Problems:    * No resolved hospital problems. *      FOLLOW UP  Future Appointments   Date Time Provider Department Center   3/17/2020  1:00 PM Babak Burch M.D. [facility] None     Martha Light M.D.  [address]  [address]  [place] [address]  [phone]    Schedule an appointment as soon as possible for a visit        MEDICATIONS ON DISCHARGE     Medication List      CONTINUE taking these medications      Instructions   albuterol 108 (90 Base) MCG/ACT Aers inhalation aerosol   Inhale 2 Puffs by mouth every four hours as needed for Shortness of Breath.  Dose:  2 Puff     Calcium Acetate (Phos Binder) 667 MG Caps   TAKE 3 CAPSULES BY MOUTH WITH MEALS (3 MEALS) AND 2 CAPSULES WITH SNACKS (2 SNACKS)     clopidogrel 75 MG Tabs  Commonly known as:  PLAVIX   Take 75 mg by mouth every evening.  Dose:  75 mg     DIALYVITE TABLET Tabs   TAKE ONE TABLET BY MOUTH DAILY     doxycycline 100 MG capsule  Commonly known as:  MONODOX   Take 1 Cap by mouth 2 times a day for 10 days.  Dose:  100 mg     fluticasone 50 MCG/ACT nasal spray  Commonly known as:  FLONASE   Spray 1-2 Sprays in nose every day. Each nostril.  Dose:  1-2 Spray     Fluticasone-Umeclidin-Vilant 100-62.5-25 MCG/INH Aepb  Commonly known as:  Trelegy Ellipta   Inhale 1 Inhaler by mouth every day.  Dose:  1 Inhaler     levalbuterol 1.25 MG/3ML Nebu  Commonly known as:  Xopenex   Doctor's comments:  COPD J44.9  3 mL by Nebulization route every four hours as needed for Shortness of Breath.  Dose:  1.25 mg     montelukast 10 MG Tabs  Commonly known as:  SINGULAIR   Take 1 Tab by mouth every evening.  Dose:  10 mg     omeprazole 40 MG delayed-release capsule  Commonly known as:   PRILOSEC   TAKE ONE CAPSULE BY MOUTH DAILY     ROPINIRole 0.5 MG Tabs  Commonly known as:  REQUIP   TAKE TWO TABLETS BY MOUTH DAILY     rosuvastatin 40 MG tablet  Commonly known as:  CRESTOR   Take 1 Tab by mouth every evening.  Dose:  40 mg     tamsulosin 0.4 MG capsule  Commonly known as:  FLOMAX   Take 0.8 mg by mouth every day.  Dose:  0.8 mg     torsemide 10 MG tablet  Commonly known as:  DEMADEX   TAKE THREE TABLETS BY MOUTH DAILY     traZODone 150 MG Tabs  Commonly known as:  DESYREL   Take 150-300 mg by mouth every bedtime.  Dose:  150-300 mg        STOP taking these medications    predniSONE 20 MG Tabs  Commonly known as:  DELTASONE            Allergies  No Known Allergies    DIET  No orders of the defined types were placed in this encounter.      ACTIVITY  As tolerated.  Weight bearing as tolerated    CONSULTATIONS  Nephrology  Pulmonology    PROCEDURES  Dialysis with ultrafiltration    LABORATORY  Lab Results   Component Value Date    SODIUM 139 03/05/2020    POTASSIUM 4.9 03/05/2020    CHLORIDE 97 03/05/2020    CO2 25 03/05/2020    GLUCOSE 101 (H) 03/05/2020    BUN 50 (H) 03/05/2020    CREATININE 6.94 (HH) 03/05/2020    CREATININE 2.1 (H) 05/06/2009        Lab Results   Component Value Date    WBC 6.8 03/07/2020    HEMOGLOBIN 8.2 (L) 03/07/2020    HEMATOCRIT 27.6 (L) 03/07/2020    PLATELETCT 222 03/07/2020        Total time of the discharge process exceeds 35 minutes.

## 2020-03-07 NOTE — PROGRESS NOTES
Report received from Donny BYRD. Plan of care discussed. Patient resting comfortably in bed. Safety precautions in place.

## 2020-03-07 NOTE — PROGRESS NOTES
Pt discharged to home. Discharge instructions provided to pt. Pt verbalizes understanding. Pt states all questions have been answered. Signed copy in chart. Prescriptions sent to n/a. Pt states that all personal belongings are in possession. Pt off unit via wheelchair, escorted by transport. Informed patient he will receive a call for outpatient chest x-ray per Dr. Schwarz.

## 2020-03-07 NOTE — PROGRESS NOTES
"Hospital Medicine Daily Progress Note    Date of Service  3/6/2020    Chief Complaint  79 y.o. male admitted 3/5/2020 with shortness of breath    Hospital Course    Per HPI:  \"79 y.o. male who presented 3/5/2020 with worsening shortness of breath.  He has been having increasing shortness of breath since December.  He is being worked up for severe aortic stenosis and is to have the procedure this month at [facility] pending insurance authorization.  He follows with Dr. Godoy there.  He has been having recurrent issues with pleural effusion on the right, this is been getting steadily worse over the last several days and yesterday he had lab work done at dialysis and was told to come to the hospital today.      Had thoracentesis.  Dialysis with ultrafiltration ongoing.        Interval Problem Update  Seen and examined. Chart reviewed, including labs and any pertinent imaging.  Denies chest pain or shortness of breath  Tolerated dialysis well today  Echo results pending  Thoracentesis labs pending    Was to have TAVR March 10, likely will be pushed back    Consultants/Specialty  Pulmonology  Nephrology    Code Status  Full    Disposition  Inpatient, anticipate home over weekend    Review of Systems  Review of Systems   Constitutional: Negative for chills and fever.   Eyes: Negative for blurred vision and double vision.   Respiratory: Positive for shortness of breath. Negative for cough and hemoptysis.    Cardiovascular: Negative for chest pain.   Gastrointestinal: Negative for abdominal pain, constipation, diarrhea, heartburn, nausea and vomiting.   Musculoskeletal: Positive for myalgias.   Skin: Negative for itching and rash.   Neurological: Negative for tingling, tremors and sensory change.   All other systems reviewed and are negative.       Physical Exam  Temp:  [36.3 °C (97.4 °F)-36.7 °C (98 °F)] 36.3 °C (97.4 °F)  Pulse:  [] 96  Resp:  [16-20] 18  BP: (102-143)/(33-68) 113/49  SpO2:  [92 %-99 %] 98 " %    Physical Exam  Vitals signs and nursing note reviewed.   Constitutional:       General: He is not in acute distress.     Appearance: He is not diaphoretic.   HENT:      Head: Normocephalic and atraumatic.      Nose: No congestion.      Mouth/Throat:      Mouth: Mucous membranes are moist.   Eyes:      General:         Right eye: No discharge.         Left eye: No discharge.      Extraocular Movements: Extraocular movements intact.      Conjunctiva/sclera: Conjunctivae normal.   Neck:      Musculoskeletal: Normal range of motion. No neck rigidity.   Cardiovascular:      Rate and Rhythm: Normal rate.      Pulses: Normal pulses.      Heart sounds: Murmur present.   Pulmonary:      Effort: Pulmonary effort is normal. No respiratory distress.      Breath sounds: No stridor. No wheezing or rales.   Abdominal:      General: Abdomen is flat. There is no distension.      Palpations: Abdomen is soft.      Tenderness: There is no abdominal tenderness.   Musculoskeletal: Normal range of motion.   Skin:     General: Skin is warm and dry.      Coloration: Skin is not jaundiced.      Findings: No bruising.   Neurological:      General: No focal deficit present.      Mental Status: He is alert and oriented to person, place, and time. Mental status is at baseline.      Cranial Nerves: No cranial nerve deficit.         Fluids    Intake/Output Summary (Last 24 hours) at 3/6/2020 1747  Last data filed at 3/6/2020 1250  Gross per 24 hour   Intake 990 ml   Output 5950 ml   Net -4960 ml       Laboratory  Recent Labs     03/04/20  1600 03/05/20  0906   WBC 5.3 7.1   RBC 2.19* 2.44*   HEMOGLOBIN 6.7* 7.5*   HEMATOCRIT 23.2* 25.6*   .9* 104.9*   MCH 30.6 30.7   MCHC 28.9* 29.3*   RDW 69.6* 68.1*   PLATELETCT 211 205   MPV 10.4 9.8     Recent Labs     03/05/20  0906   SODIUM 139   POTASSIUM 4.9   CHLORIDE 97   CO2 25   GLUCOSE 101*   BUN 50*   CREATININE 6.94*   CALCIUM 10.1     Recent Labs     03/05/20  0906   INR 1.09                Imaging  DX-CHEST-PORTABLE (1 VIEW)   Final Result      No pneumothorax post right-sided thoracentesis.      US-THORACENTESIS PUNCTURE RIGHT   Final Result      1. Ultrasound guided right sided diagnostic and therapeutic thoracentesis.      2. 1640 mL of fluid withdrawn.      EC-ECHOCARDIOGRAM COMPLETE W/O CONT         DX-CHEST-PORTABLE (1 VIEW)   Final Result      New large right subpulmonic pleural effusion causes multisegmental atelectasis           Assessment/Plan  Transudative pleural effusion- (present on admission)  Assessment & Plan  Likely etiology ESRD and aortic stenosis   1600 cc removed via thoracentesis  Await labs  Continue doxycycline for now    Severe aortic stenosis- (present on admission)  Assessment & Plan  Pending insurance auth for TAVR at [facility]. Dr Godoy  High risk for decompensation    Essential hypertension- (present on admission)  Assessment & Plan  Stable    ESRD (end stage renal disease) on dialysis (HCC)- (present on admission)  Assessment & Plan  Patient normally receives Monday Wednesday Friday hemodialysis  Cardiology is recommending daily ultrafiltration to remove the fluid until the patient can be transferred to [place] for TAVR (pending insurance Auth)    Acute on chronic respiratory failure with hypoxemia (HCC)  Assessment & Plan  Patient is on home O2 at baseline 2-1/2 to 3 L  He has had worsening shortness of breath over the last several weeks due to worsening pleural effusion  Pulmonology consulted    Anemia in CKD (chronic kidney disease)- (present on admission)  Assessment & Plan  Stable    COPD (chronic obstructive pulmonary disease) ([facility])- (present on admission)  Assessment & Plan  RT protocol has been ordered       VTE prophylaxis: heparin

## 2020-03-07 NOTE — PROGRESS NOTES
Report received from ROCHELLE Herrera. Plan of care discussed. Patient resting comfortably in bed, declines any further needs at this time. Safety precautions in place.

## 2020-03-07 NOTE — CARE PLAN
Problem: Venous Thromboembolism (VTW)/Deep Vein Thrombosis (DVT) Prevention:  Goal: Patient will participate in Venous Thrombosis (VTE)/Deep Vein Thrombosis (DVT)Prevention Measures  Outcome: PROGRESSING AS EXPECTED  Note: Pt currently on plavix.  Heparin held based on neph recommendation.       Problem: Respiratory:  Goal: Respiratory status will improve  Outcome: PROGRESSING AS EXPECTED  Note: Pt denies dyspnea following thoracentesis and fluid removal through dialysis.

## 2020-03-07 NOTE — PROGRESS NOTES
Telemetry Shift Summary    Rhythm SR, BBB  HR Range 70s-90s  Ectopy frequent PAC, occasional PVC  Measurements 0.18/0.14/0.38        Normal Values  Rhythm SR  HR Range    Measurements 0.12-0.20 / 0.06-0.10  / 0.30-0.52

## 2020-03-07 NOTE — ASSESSMENT & PLAN NOTE
b/l but right worse much larger than left  S/p thoracentesis - bloody and exudative by LDH criteria  Pathology ending  Glucose wnl  cxs negative  This effusion may or may not be related to the severity of his AS and heart failure  He has an extensive smoking hx and puts him at risk for lung ca    He is undergoing daily ultrafiltration and if does not reoccur would support heart failure  Would awaiting pathology and reassess with cxr in 48 hrs for better assessment    D/w patient

## 2020-03-08 NOTE — PROGRESS NOTES
Telemetry Shift Summary    Rhythm SR-ST w/BBB  HR Range 80-100s  Ectopy R-F PVCs, R trigem  Measurements .20/.12/.36        Normal Values  Rhythm SR  HR Range    Measurements 0.12-0.20 / 0.06-0.10  / 0.30-0.52

## 2020-03-10 ENCOUNTER — TELEPHONE (OUTPATIENT)
Dept: PULMONOLOGY | Facility: HOSPICE | Age: 80
End: 2020-03-10

## 2020-03-10 NOTE — TELEPHONE ENCOUNTER
Patient left voice message states was recently in the hospital was seen by Dr. Najera. Was encouraged to be seen at the pulmonary clinic with CXR regarding US-THORACENTESIS PUNCTURE RIGHT.   Patient completed thoracentesis in the hospital 3/5/2020   Please advise need for CXR prior to appt? Dr. Durant does have openings 3/11/2020     Last seen 12/19/19 Dr. Durant

## 2020-03-11 LAB
BACTERIA FLD AEROBE CULT: ABNORMAL
GRAM STN SPEC: ABNORMAL
SIGNIFICANT IND 70042: ABNORMAL
SITE SITE: ABNORMAL
SOURCE SOURCE: ABNORMAL

## 2020-03-11 NOTE — TELEPHONE ENCOUNTER
Nadya Durant M.D.  You 13 hours ago (6:44 PM)      No need for new CXR as they repeated post thoracentesis.     Thanks,     CT    Routing comment

## 2020-03-11 NOTE — TELEPHONE ENCOUNTER
Alejandra Black M.D.  You 7 minutes ago (8:17 AM)      Thank you for up date. We can wait on cXR based on exam    Routing comment

## 2020-03-12 ENCOUNTER — HOSPITAL ENCOUNTER (OUTPATIENT)
Dept: LAB | Facility: MEDICAL CENTER | Age: 80
End: 2020-03-12
Attending: INTERNAL MEDICINE
Payer: MEDICARE

## 2020-03-12 ENCOUNTER — ANTICOAGULATION MONITORING (OUTPATIENT)
Dept: VASCULAR LAB | Facility: MEDICAL CENTER | Age: 80
End: 2020-03-12

## 2020-03-12 ENCOUNTER — HOSPITAL ENCOUNTER (OUTPATIENT)
Dept: LAB | Facility: MEDICAL CENTER | Age: 80
End: 2020-03-12
Attending: NURSE PRACTITIONER
Payer: MEDICARE

## 2020-03-12 DIAGNOSIS — I48.92 ATRIAL FLUTTER, UNSPECIFIED TYPE (HCC): ICD-10-CM

## 2020-03-12 LAB
BASO STIPL BLD QL SMEAR: NORMAL
BASOPHILS # BLD AUTO: 0.2 % (ref 0–1.8)
BASOPHILS # BLD: 0.01 K/UL (ref 0–0.12)
COMMENT 1642: NORMAL
EOSINOPHIL # BLD AUTO: 0.11 K/UL (ref 0–0.51)
EOSINOPHIL NFR BLD: 2.7 % (ref 0–6.9)
ERYTHROCYTE [DISTWIDTH] IN BLOOD BY AUTOMATED COUNT: 62.9 FL (ref 35.9–50)
FERRITIN SERPL-MCNC: 185.1 NG/ML (ref 22–322)
FOLATE SERPL-MCNC: >24 NG/ML
HCT VFR BLD AUTO: 27.7 % (ref 42–52)
HGB BLD-MCNC: 8.2 G/DL (ref 14–18)
HYPOCHROMIA BLD QL SMEAR: ABNORMAL
IMM GRANULOCYTES # BLD AUTO: 0.02 K/UL (ref 0–0.11)
IMM GRANULOCYTES NFR BLD AUTO: 0.5 % (ref 0–0.9)
INR PPP: 1.07 (ref 0.87–1.13)
IRON SATN MFR SERPL: 13 % (ref 15–55)
IRON SERPL-MCNC: 39 UG/DL (ref 50–180)
LYMPHOCYTES # BLD AUTO: 0.55 K/UL (ref 1–4.8)
LYMPHOCYTES NFR BLD: 13.5 % (ref 22–41)
MCH RBC QN AUTO: 30.5 PG (ref 27–33)
MCHC RBC AUTO-ENTMCNC: 29.6 G/DL (ref 33.7–35.3)
MCV RBC AUTO: 103 FL (ref 81.4–97.8)
MONOCYTES # BLD AUTO: 0.56 K/UL (ref 0–0.85)
MONOCYTES NFR BLD AUTO: 13.8 % (ref 0–13.4)
MORPHOLOGY BLD-IMP: NORMAL
NEUTROPHILS # BLD AUTO: 2.82 K/UL (ref 1.82–7.42)
NEUTROPHILS NFR BLD: 69.3 % (ref 44–72)
NRBC # BLD AUTO: 0 K/UL
NRBC BLD-RTO: 0 /100 WBC
PLATELET # BLD AUTO: 239 K/UL (ref 164–446)
PLATELET BLD QL SMEAR: NORMAL
PMV BLD AUTO: 10.3 FL (ref 9–12.9)
POLYCHROMASIA BLD QL SMEAR: NORMAL
PROTHROMBIN TIME: 14.2 SEC (ref 12–14.6)
RBC # BLD AUTO: 2.69 M/UL (ref 4.7–6.1)
RBC BLD AUTO: PRESENT
TIBC SERPL-MCNC: 300 UG/DL (ref 250–450)
VIT B12 SERPL-MCNC: 714 PG/ML (ref 211–911)
WBC # BLD AUTO: 4.1 K/UL (ref 4.8–10.8)

## 2020-03-12 PROCEDURE — 82746 ASSAY OF FOLIC ACID SERUM: CPT

## 2020-03-12 PROCEDURE — 82607 VITAMIN B-12: CPT

## 2020-03-12 PROCEDURE — 36415 COLL VENOUS BLD VENIPUNCTURE: CPT

## 2020-03-12 PROCEDURE — 85610 PROTHROMBIN TIME: CPT

## 2020-03-12 PROCEDURE — 83550 IRON BINDING TEST: CPT

## 2020-03-12 PROCEDURE — 85025 COMPLETE CBC W/AUTO DIFF WBC: CPT

## 2020-03-12 PROCEDURE — 83540 ASSAY OF IRON: CPT

## 2020-03-12 PROCEDURE — 82728 ASSAY OF FERRITIN: CPT

## 2020-03-16 ENCOUNTER — TELEPHONE (OUTPATIENT)
Dept: MEDICAL GROUP | Facility: MEDICAL CENTER | Age: 80
End: 2020-03-16

## 2020-03-16 NOTE — TELEPHONE ENCOUNTER
VOICEMAIL  1. Caller Name: Parmjit Castelan                        Call Back Number: [phone] (home)      2. Message: Patient called and left a message stating that he has is Hemoglobin drawn and that you always call him to let her know what it was? Would you like me to call him and let him know?    Thank you.     3. Patient approves office to leave a detailed voicemail/MyChart message: yes

## 2020-03-16 NOTE — TELEPHONE ENCOUNTER
Phone Number Called: [phone] (home)      Call outcome: Spoke to patient regarding message below.    Message: Patient is aware of the results.

## 2020-03-17 ENCOUNTER — APPOINTMENT (OUTPATIENT)
Dept: PULMONOLOGY | Facility: HOSPICE | Age: 80
End: 2020-03-17
Payer: MEDICARE

## 2020-03-17 ENCOUNTER — OFFICE VISIT (OUTPATIENT)
Dept: PULMONOLOGY | Facility: HOSPICE | Age: 80
End: 2020-03-17
Payer: MEDICARE

## 2020-03-17 ENCOUNTER — OFFICE VISIT (OUTPATIENT)
Dept: CARDIOLOGY | Facility: MEDICAL CENTER | Age: 80
End: 2020-03-17
Payer: MEDICARE

## 2020-03-17 VITALS
DIASTOLIC BLOOD PRESSURE: 60 MMHG | WEIGHT: 157 LBS | SYSTOLIC BLOOD PRESSURE: 134 MMHG | HEART RATE: 106 BPM | HEIGHT: 68 IN | TEMPERATURE: 99.1 F | OXYGEN SATURATION: 92 % | RESPIRATION RATE: 16 BRPM | BODY MASS INDEX: 23.79 KG/M2

## 2020-03-17 VITALS
WEIGHT: 157.41 LBS | HEIGHT: 68 IN | RESPIRATION RATE: 14 BRPM | BODY MASS INDEX: 23.86 KG/M2 | HEART RATE: 98 BPM | DIASTOLIC BLOOD PRESSURE: 72 MMHG | OXYGEN SATURATION: 96 % | SYSTOLIC BLOOD PRESSURE: 140 MMHG

## 2020-03-17 DIAGNOSIS — I25.83 CORONARY ARTERY DISEASE DUE TO LIPID RICH PLAQUE: ICD-10-CM

## 2020-03-17 DIAGNOSIS — D64.9 ANEMIA, UNSPECIFIED TYPE: ICD-10-CM

## 2020-03-17 DIAGNOSIS — N18.6 ESRD (END STAGE RENAL DISEASE) (HCC): ICD-10-CM

## 2020-03-17 DIAGNOSIS — I25.10 CORONARY ARTERY DISEASE DUE TO LIPID RICH PLAQUE: ICD-10-CM

## 2020-03-17 DIAGNOSIS — J44.9 CHRONIC OBSTRUCTIVE PULMONARY DISEASE, UNSPECIFIED COPD TYPE (HCC): ICD-10-CM

## 2020-03-17 DIAGNOSIS — I35.0 AORTIC VALVE STENOSIS, ETIOLOGY OF CARDIAC VALVE DISEASE UNSPECIFIED: ICD-10-CM

## 2020-03-17 DIAGNOSIS — Z87.891 FORMER SMOKER: ICD-10-CM

## 2020-03-17 DIAGNOSIS — I35.0 SEVERE AORTIC STENOSIS: ICD-10-CM

## 2020-03-17 DIAGNOSIS — J90 PLEURAL EFFUSION: ICD-10-CM

## 2020-03-17 LAB
LV EJECT FRACT  99904: 65
LV EJECT FRACT MOD 2C 99903: 49.52
LV EJECT FRACT MOD 4C 99902: 64.65
LV EJECT FRACT MOD BP 99901: 58.03

## 2020-03-17 PROCEDURE — 93306 TTE W/DOPPLER COMPLETE: CPT | Mod: 26 | Performed by: INTERNAL MEDICINE

## 2020-03-17 PROCEDURE — 99214 OFFICE O/P EST MOD 30 MIN: CPT | Performed by: INTERNAL MEDICINE

## 2020-03-17 RX ORDER — BUDESONIDE AND FORMOTEROL FUMARATE DIHYDRATE 160; 4.5 UG/1; UG/1
2 AEROSOL RESPIRATORY (INHALATION) 2 TIMES DAILY
Qty: 2 INHALER | Refills: 0 | Status: SHIPPED | OUTPATIENT
Start: 2020-03-17 | End: 2020-03-17

## 2020-03-17 RX ORDER — GABAPENTIN 300 MG/1
300 CAPSULE ORAL 3 TIMES DAILY
COMMUNITY
End: 2020-07-04

## 2020-03-17 RX ORDER — BUDESONIDE AND FORMOTEROL FUMARATE DIHYDRATE 160; 4.5 UG/1; UG/1
2 AEROSOL RESPIRATORY (INHALATION) 2 TIMES DAILY
Qty: 1 INHALER | Refills: 3 | Status: SHIPPED | OUTPATIENT
Start: 2020-03-17 | End: 2020-08-09

## 2020-03-17 RX ORDER — BUDESONIDE AND FORMOTEROL FUMARATE DIHYDRATE 160; 4.5 UG/1; UG/1
2 AEROSOL RESPIRATORY (INHALATION) 2 TIMES DAILY
Qty: 2 INHALER | Refills: 0 | COMMUNITY
Start: 2020-03-17 | End: 2020-03-17

## 2020-03-17 ASSESSMENT — ENCOUNTER SYMPTOMS
DIZZINESS: 0
EYE DISCHARGE: 0
BACK PAIN: 0
SHORTNESS OF BREATH: 1
CONSTIPATION: 0
PND: 0
SPUTUM PRODUCTION: 0
DOUBLE VISION: 0
MYALGIAS: 0
BLURRED VISION: 0
NAUSEA: 0
PHOTOPHOBIA: 0
DIARRHEA: 0
EYE REDNESS: 0
WEAKNESS: 0
FOCAL WEAKNESS: 0
FALLS: 0
ABDOMINAL PAIN: 0
CLAUDICATION: 0
SORE THROAT: 0
SPEECH CHANGE: 0
COUGH: 1
NECK PAIN: 0
DEPRESSION: 0
CHILLS: 0
TREMORS: 0
HEADACHES: 0
FEVER: 0
PALPITATIONS: 0
VOMITING: 0
ORTHOPNEA: 0
STRIDOR: 0
DIAPHORESIS: 0
SINUS PAIN: 0
HEARTBURN: 0
WEIGHT LOSS: 0
WHEEZING: 0
EYE PAIN: 0
HEMOPTYSIS: 0

## 2020-03-17 ASSESSMENT — FIBROSIS 4 INDEX
FIB4 SCORE: 1.37
FIB4 SCORE: 1.37

## 2020-03-17 NOTE — PROGRESS NOTES
"      Cardiology Follow-up Consultation Note    Date of note:    3/17/2020    Primary Care Provider: Martha Light M.D.    Name:             Parmjit Castelan     YOB: 1940  MRN:               [id number]    CC: Follow-up pleural effusion, aortic stenosis, peripheral arterial disease    Patient ID/HPI:   79-year-old male patient history of end-stage renal disease on dialysis, coronary artery disease status post LAD stent, peripheral arterial disease, sleep apnea, history of atrial flutter not on anticoagulation because of bleeding, recently in the hospital for pleural effusion, had thoracocentesis.  He is here for follow-up.  He is following up with Dr. Godoy for severe aortic stenosis at [place].  He had work-up done, awaiting to see him next week.  He complains of significant fatigue, loss of energy.      ROS  Shortness of breath, fatigue, joint pains, arthritis.    Past Medical History:   Diagnosis Date   • Anesthesia     \"needs more sedation\" (can sometimes hear MD during procedure)    • Anticoagulation monitoring, special range    • Aortic stenosis     mod AS- concern for low flow severe AS with rEFof 30%   • Arterial leg ulcer ([facility]) 11/8/2018   • Atrial flutter ([facility]) 6/12/2019   • Basal cell carcinoma of left cheek 3/26/2015   • BPH 7/14/2009   • Bronchitis 12/25/2018   • CAD (coronary artery disease) 2/20/2014   • CATARACT     sanjuanita surgery complete   • CHF (congestive heart failure), NYHA class II, chronic, systolic ([facility]) E F30 in setting of atrial flutter 6/13/2019   • Chronic respiratory failure with hypoxia ([facility]) 5/8/2016   • CKD (chronic kidney disease) stage 4, GFR 15-29 ml/min ([facility]) 1/15/2010    end stage renal disease   • COPD (chronic obstructive pulmonary disease) ([facility])     wears 2.5 L o2 sometimes when he sleeps   • Detached retina    • Dialysis     m,w,f [place]   • EMPHYSEMA    • Glaucoma     Early stage   • Gout    • Heart burn     occas   • Heart murmur     maria esther " rosa cardiologist   • Hypertension    • Indigestion     occas   • Kidney cyst    • Leg pain, bilateral 8/10/2015   • On supplemental oxygen therapy    • Peripheral vascular disease ([facility]) 8/10/2015   • Pneumonia    • Primary insomnia 3/22/2018   • Proteinuria    • PVD (peripheral vascular disease) ([facility])    • Sleep apnea     O2 PER CANULA  AT NIGHT 2l/m         Past Surgical History:   Procedure Laterality Date   • PB COLONOSCOPY,FLEX,W/CONTROL, BLEEDING N/A 2/24/2020    Procedure: COLONOSCOPY, WITH ARGON PLASMA COAGULATION;  Surgeon: Juan Matthews M.D.;  Location: SURGERY SAME DAY [facility];  Service: Gastroenterology   • GASTROSCOPY W/PUSH ENTERSCOPY N/A 2/24/2020    Procedure: GASTROSCOPY, WITH PUSH ENTEROSCOPY;  Surgeon: Juan Matthews M.D.;  Location: SURGERY SAME DAY [facility];  Service: Gastroenterology   • PB COLONOSCOPY,DIAGNOSTIC N/A 2/23/2020    Procedure: COLONOSCOPY;  Surgeon: Enrique Child D.O.;  Location: SURGERY [facility];  Service: Gastroenterology   • GASTROSCOPY N/A 2/23/2020    Procedure: GASTROSCOPY;  Surgeon: Enrique Child D.O.;  Location: SURGERY [facility];  Service: Gastroenterology   • CAPSULE ENDOSCOPY ADMINISTRATION N/A 2/20/2020    Procedure: ADMINISTRATION, CAPSULE, FOR CAPSULE ENDOSCOPY;  Surgeon: Gucci Westbrook M.D.;  Location: ENDOSCOPY [facility];  Service: Gastroenterology   • CAPSULE ENDOSCOPY RETRIEVAL  2/20/2020    Procedure: CAPSULE ENDOSCOPY RETRIEVAL;  Surgeon: Gucci Westbrook M.D.;  Location: ENDOSCOPY [facility];  Service: Gastroenterology   • PB SMALL BOWEL ENDOSCOPY,PAST 2ND DUOD  2/19/2020        • COLONOSCOPY - ENDO  2/19/2020        • PB COLONOSCOPY,DIAGNOSTIC  2/19/2020    Procedure: COLONOSCOPY;  Surgeon: Gucci Westbrook M.D.;  Location: SURGERY [facility];  Service: Gastroenterology   • GASTROSCOPY  2/19/2020    Procedure: GASTROSCOPY;  Surgeon: Gucci Westbrook M.D.;  Location: SURGERY [facility];  Service:  Gastroenterology   • GASTROSCOPY-ENDO  2/7/2020    Procedure: GASTROSCOPY;  Surgeon: Jong Carrasquillo M.D.;  Location: ENDOSCOPY [facility];  Service: Gastroenterology   • STENT PLACEMENT  01/26/2020    lda   • AV FISTULA CREATION Right 8/6/2019    Procedure: CREATION, AV FISTULA -  RIGHT ARM  ,  and Ligation of Left Arm Fistula;  Surgeon: Sera Peters M.D.;  Location: SURGERY [facility];  Service: General   • CATH PLACEMENT Right 8/6/2019    Procedure: INSERTION, CATHETER - PERMA ,;  Surgeon: Sera Peters M.D.;  Location: SURGERY [facility];  Service: General   • JUSTIN  6/13/2019    Procedure: ECHOCARDIOGRAM, TRANSESOPHAGEAL;  Surgeon: Harvinder Hoang M.D.;  Location: SURGERY [facility];  Service: Cardiac   • CARDIOVERSION  6/13/2019    Procedure: CARDIOVERSION;  Surgeon: Harvinder Hoang M.D.;  Location: [facility];  Service: Cardiac   • AV FISTULA CREATION Right 2/21/2019    Procedure: AV FISTULA CREATION - ARM;  Surgeon: David Cason M.D.;  Location: SURGERY [facility];  Service: General   • FEMORAL ENDARTERECTOMY Left 1/25/2019    Procedure: FEMORAL ENDARTERECTOMY;  Surgeon: David Cason M.D.;  Location: SURGERY [facility];  Service: General   • FEMORAL POPLITEAL BYPASS Left 1/25/2019    Procedure: LEFT FEMORAL POPLITEAL POLYTETRAFLUOROETHYLENE ePTFE(PROPATEN VASCULAR GRAFT) BYPASS;  Surgeon: David Cason M.D.;  Location: SURGERY [facility];  Service: General   • ANGIOGRAM Left 1/25/2019    Procedure: LEFT LEG ANGIOGRAM;  Surgeon: David Cason M.D.;  Location: SURGERY [facility];  Service: General   • ENDOSCOPY PROCEDURE  3/21/2018    Procedure: ENDOSCOPY PROCEDURE/UPPER;  Surgeon: Enrique Child D.O.;  Location: [facility];  Service: Gastroenterology   • COLONOSCOPY - ENDO  8/15/2016    Procedure: COLONOSCOPY - ENDO;  Surgeon: Mane Whatley M.D.;  Location: ENDOSCOPY [facility];  Service:    •  GASTROSCOPY WITH BIOPSY  8/13/2016    Procedure: GASTROSCOPY WITH BIOPSY;  Surgeon: Jorge Leavitt M.D.;  Location: ENDOSCOPY [facility];  Service:    • RECOVERY  12/23/2015    Procedure: VASCULAR CASE-CASON-LEFT ARM FISTULOGRAM WITH ANGIOPLASTY;  Surgeon: Recoveryonbhavani Surgery;  Location: SURGERY PRE-POST PROC UNIT [facility];  Service:    • RECOVERY  3/24/2015    Performed by Recoveryonly Surgery at SURGERY PRE-POST PROC UNIT [facility]   • RECOVERY  7/29/2014    Performed by Ir-Recovery Surgery at SURGERY SAME DAY [facility] ORS   • RECOVERY  3/24/2014    Performed by Ir[facility]Recovery Surgery at SURGERY [facility]   • RECOVERY  12/17/2013    Performed by Ir-Recovery Surgery at SURGERY SAME DAY [facility] ORS   • RECOVERY  7/2/2013    Performed by Ir[facility]Recovery Surgery at SURGERY SAME DAY [facility] ORS   • AV FISTULA THROMBOLYSIS  7/2/2013    Performed by David Cason M.D. at SURGERY [facility]   • RECOVERY  1/29/2013    Performed by Ir-Recovery Surgery at SURGERY SAME DAY [facility] ORS   • RECOVERY  7/23/2012    Performed by SURGERY, IR-RECOVERY at SURGERY SAME DAY [facility] ORS   • VITRECTOMY POSTERIOR  10/11/2011    Performed by NAHUM GE at SURGERY SAME DAY [facility] ORS   • RECOVERY  8/12/2011    Performed by SURGERY, IR-RECOVERY at SURGERY SAME DAY [facility] ORS   • VITRECTOMY POSTERIOR  1/18/2011    Performed by NAHUM GE at SURGERY SAME DAY [facility] ORS   • SCLERAL BUCKLING  1/18/2011    Performed by NAHUM GE at SURGERY SAME DAY [facility] ORS   • AV FISTULOGRAM  9/17/2010    Performed by DAVID CASON at SUR[facility]   • ANGIOPLASTY BALLOON  9/17/2010    Performed by DAVID CASON at SURGERY [facility] ORS   • AV FISTULOGRAM  7/23/2010    Performed by DAVID CASON at SURGERY [facility]   • ANGIOPLASTY BALLOON  7/23/2010    Performed by DAVID CASON at SURGERY [facility]   • AV FISTULA REVISION  2/19/2010    Performed by WILLIE HATCH at  SURGERY [facility] ORS   • AV FISTULA CREATION  2/12/2010    Performed by ALESHIA MOSCOSO at SURGERY [facility] ORS   • CATARACT PHACO WITH IOL  4/8/08    Performed by STACEY PHILLIPS at SURGERY SAME DAY [facility] ORS   • OTHER ORTHOPEDIC SURGERY  1998    right toe for facititis         Current Outpatient Medications   Medication Sig Dispense Refill   • gabapentin (NEURONTIN) 300 MG Cap Take 300 mg by mouth 3 times a day.     • albuterol 108 (90 Base) MCG/ACT Aero Soln inhalation aerosol Inhale 2 Puffs by mouth every four hours as needed for Shortness of Breath. 1 Inhaler 0   • rosuvastatin (CRESTOR) 40 MG tablet Take 1 Tab by mouth every evening. 30 Tab 11   • clopidogrel (PLAVIX) 75 MG Tab Take 75 mg by mouth every evening.     • tamsulosin (FLOMAX) 0.4 MG capsule Take 0.8 mg by mouth every day.     • ROPINIRole (REQUIP) 0.5 MG Tab TAKE TWO TABLETS BY MOUTH DAILY 180 Tab 2   • omeprazole (PRILOSEC) 40 MG delayed-release capsule TAKE ONE CAPSULE BY MOUTH DAILY 30 Cap 2   • Fluticasone-Umeclidin-Vilant (TRELEGY ELLIPTA) 100-62.5-25 MCG/INH AEROSOL POWDER, BREATH ACTIVATED Inhale 1 Inhaler by mouth every day. 1 Each 6   • montelukast (SINGULAIR) 10 MG Tab Take 1 Tab by mouth every evening. 90 Tab 3   • fluticasone (FLONASE) 50 MCG/ACT nasal spray Spray 1-2 Sprays in nose every day. Each nostril. 1 Bottle 6   • traZODone (DESYREL) 150 MG Tab Take 150-300 mg by mouth every bedtime.     • B Complex-C-Folic Acid (DIALYVITE TABLET) Tab TAKE ONE TABLET BY MOUTH DAILY 90 Tab 3   • Calcium Acetate, Phos Binder, 667 MG Cap TAKE 3 CAPSULES BY MOUTH WITH MEALS (3 MEALS) AND 2 CAPSULES WITH SNACKS (2 SNACKS) 420 Cap 11   • torsemide (DEMADEX) 10 MG tablet TAKE THREE TABLETS BY MOUTH DAILY 270 Tab 3   • levalbuterol (XOPENEX) 1.25 MG/3ML Nebu Soln 3 mL by Nebulization route every four hours as needed for Shortness of Breath. 120 Bullet 11     No current facility-administered medications for this visit.          No Known  Allergies      Family History   Problem Relation Age of Onset   • Stroke Mother    • Hypertension Mother    • Lung Disease Father         Emphysema, resp failure   • Genitourinary () Problems Maternal Aunt         hematuria   • Hypertension Brother          Social History     Socioeconomic History   • Marital status:      Spouse name: Not on file   • Number of children: Not on file   • Years of education: Not on file   • Highest education level: Not on file   Occupational History   • Not on file   Social Needs   • Financial resource strain: Not on file   • Food insecurity     Worry: Not on file     Inability: Not on file   • Transportation needs     Medical: Not on file     Non-medical: Not on file   Tobacco Use   • Smoking status: Former Smoker     Packs/day: 1.00     Years: 40.00     Pack years: 40.00     Types: Cigarettes     Last attempt to quit: 2009     Years since quittin.2   • Smokeless tobacco: Never Used   • Tobacco comment: 1 pk a day for 35 yrs, QUIT 2010   Substance and Sexual Activity   • Alcohol use: No     Alcohol/week: 0.0 oz   • Drug use: No   • Sexual activity: Never     Partners: Female     Comment: , two sons, 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   Lifestyle   • Physical activity     Days per week: Not on file     Minutes per session: Not on file   • Stress: Not on file   Relationships   • Social connections     Talks on phone: Not on file     Gets together: Not on file     Attends Buddhist service: Not on file     Active member of club or organization: Not on file     Attends meetings of clubs or organizations: Not on file     Relationship status: Not on file   • Intimate partner violence     Fear of current or ex partner: Not on file     Emotionally abused: Not on file     Physically abused: Not on file     Forced sexual activity: Not on file   Other Topics Concern   • Not on file   Social History Narrative   • Not on file         Physical Exam:  Ambulatory Vitals  BP  "140/72 (BP Location: Left arm, Patient Position: Sitting, BP Cuff Size: Adult)   Pulse 98   Resp 14   Ht 1.727 m (5' 8\")   Wt 71.4 kg (157 lb 6.5 oz)   SpO2 96%    Oxygen Therapy:  Pulse Oximetry: 96 %  BP Readings from Last 4 Encounters:   03/17/20 140/72   03/07/20 128/53   03/02/20 (!) 161/76   02/27/20 144/64       Weight/BMI: Body mass index is 23.93 kg/m².  Wt Readings from Last 4 Encounters:   03/17/20 71.4 kg (157 lb 6.5 oz)   03/07/20 72.8 kg (160 lb 7.9 oz)   02/27/20 75 kg (165 lb 5.5 oz)   02/27/20 75.3 kg (166 lb)       General: Well appearing and in no apparent distress  Head: atrumatic  Eyes: No conjunctival pallor   ENT: normal external appearance of nose and ears  Neck: JVD absent, carotid bruits absent  Lungs: respiratory sounds  normal, additional breath sounds absent  Heart: Regular rhythm,   No palpable thrills on palpation, 3 x 6 systolic murmur aortic area, no rubs,   Lower extremity edema absent.   Pedal pulses normal  Abdomen: soft, non tender, non distended.  Extremities/MSK: no clubbing, no cyanosis  Neurological: normal orientation, Gait normal   Psychiatric: Appropriate affect, intact judgement and insight  Skin: Warm extremities        Lab Data Review:  Lab Results   Component Value Date/Time    CHOLSTRLTOT 107 02/07/2020 05:27 AM    LDL 49 02/07/2020 05:27 AM    HDL 45 02/07/2020 05:27 AM    TRIGLYCERIDE 65 02/07/2020 05:27 AM       Lab Results   Component Value Date/Time    SODIUM 139 03/05/2020 09:06 AM    POTASSIUM 4.9 03/05/2020 09:06 AM    CHLORIDE 97 03/05/2020 09:06 AM    CO2 25 03/05/2020 09:06 AM    GLUCOSE 101 (H) 03/05/2020 09:06 AM    BUN 50 (H) 03/05/2020 09:06 AM    CREATININE 6.94 (HH) 03/05/2020 09:06 AM    CREATININE 2.1 (H) 05/06/2009 10:08 AM     Lab Results   Component Value Date/Time    ALKPHOSPHAT 121 (H) 03/05/2020 09:06 AM    ASTSGOT 17 03/05/2020 09:06 AM    ALTSGPT 16 03/05/2020 09:06 AM    TBILIRUBIN 0.4 03/05/2020 09:06 AM      Lab Results   Component " Value Date/Time    WBC 4.1 (L) 03/12/2020 12:56 PM     Echo 3/5:  Severe AS,  LVEF 50%.      Impression and Plan:  79-year-old male patient with very complex past medical history consists of end-stage renal disease on dialysis, coronary artery disease status post LAD stent, peripheral arterial disease, sleep apnea, history of atrial flutter not on anticoagulation because of bleeding, severe symptomatic aortic stenosis, chronic anemia.  -Recommend follow-up with Dr. Godoy at [place] to get TAVR valve procedure.  -Continue Plavix and Lipitor.  He did not tolerate aspirin, Coumadin because of bleeding and anemia.  -Continue Crestor for hyperlipidemia.  -Blood pressure is normal today.  -Follow-up in 6 months.      Babak ROBBINS  Interventional cardiologist  [facility], [address]  [address] [address]  [place] [address]  Phone: [phone]  Fax: [phone]

## 2020-03-17 NOTE — PROGRESS NOTES
Chief Complaint   Patient presents with   • COPD     last seen 12/19/19 Dr. Durant HOS DX pleural effusion 3-5/2020-3/7/2020    • Results     CXR 3/5/2020, US-THORACENTESIS PUNCTURE RIGHT 3/5/2020         HPI: This patient is a 79 y.o. male whom is followed in our clinic for COPD and recurrent pleural effusion last seen by Dr. Durant on 12/19/19.  The pt has a relatively complicated PMHx significant for ESRD on HD, CAD s/p PCI in January of this year, hx of AF/flutter previously on chronic anticoagulation stopped earlier this year due to GIB with resultant anemia requiring hospitalization and transfusion.  The pt has severe AS is being evaluated for TAVR with dr. Godoy at [facility].  He has been followed in our clinic for COPD with PFTs from 6/2019 demonstrating a mixed restrictive obstructive pattern with +BD response and mildly reduced DLCO at 77% predicted.  His COPD regimen has included Trelegy most recently but he has been on symbicort in the past as well as CHELY.  He also uses singulair and flonase for allergic component.   He has had R sided pleural effusion at least since 10/2019 when it was first sampled and transudative.  Prior to that he suffered pericardial effusion with tamponade requiring pericardiocentesis in August of 2019.  He has had recurrent effusion since that time in addition to worsening volume control in the setting of anemia, AS and ESRD. He underwent repeat thoracentesis on 3/5 and TTE was performed as well but I see no formal interpretation yet in the chart.  His effusion was exudative but RBC count was also significantly elevated.  The pt is no longer on anticoagulation but remains on plavix. He presents today for f/u with increased cough which he tells me is usually his symptom when fluid is reaccumulating in the lung. Cytology from his most recent pleural fluid was negative for malignant cells. The pt is a former tobacco user. I see no recent CT chest but pt tells me he recent underwent CT  "chest/abdomen and pelvis as part of w/u for TAVR at [facility].    Past Medical History:   Diagnosis Date   • Anesthesia     \"needs more sedation\" (can sometimes hear MD during procedure)    • Anticoagulation monitoring, special range    • Aortic stenosis     mod AS- concern for low flow severe AS with rEFof 30%   • Arterial leg ulcer ([facility]) 11/8/2018   • Atrial flutter ([facility]) 6/12/2019   • Basal cell carcinoma of left cheek 3/26/2015   • BPH 7/14/2009   • Bronchitis 12/25/2018   • CAD (coronary artery disease) 2/20/2014   • CATARACT     sanjuanita surgery complete   • CHF (congestive heart failure), NYHA class II, chronic, systolic ([facility]) E F30 in setting of atrial flutter 6/13/2019   • Chronic respiratory failure with hypoxia ([facility]) 5/8/2016   • CKD (chronic kidney disease) stage 4, GFR 15-29 ml/min ([facility]) 1/15/2010    end stage renal disease   • COPD (chronic obstructive pulmonary disease) ([facility])     wears 2.5 L o2 sometimes when he sleeps   • Detached retina    • Dialysis     m,w,f [facility]   • EMPHYSEMA    • Glaucoma     Early stage   • Gout    • Heart burn     occas   • Heart murmur     maria esther lee cardiologist   • Hypertension    • Indigestion     occas   • Kidney cyst    • Leg pain, bilateral 8/10/2015   • On supplemental oxygen therapy    • Peripheral vascular disease ([facility]) 8/10/2015   • Pneumonia    • Primary insomnia 3/22/2018   • Proteinuria    • PVD (peripheral vascular disease) ([facility])    • Sleep apnea     O2 PER CANULA  AT NIGHT 2l/m       Social History     Socioeconomic History   • Marital status:      Spouse name: Not on file   • Number of children: Not on file   • Years of education: Not on file   • Highest education level: Not on file   Occupational History   • Not on file   Social Needs   • Financial resource strain: Not on file   • Food insecurity     Worry: Not on file     Inability: Not on file   • Transportation needs     Medical: Not on file     Non-medical: Not on file   Tobacco Use   • " Smoking status: Former Smoker     Packs/day: 1.00     Years: 40.00     Pack years: 40.00     Types: Cigarettes     Last attempt to quit: 2009     Years since quittin.2   • Smokeless tobacco: Never Used   • Tobacco comment: 1 pk a day for 35 yrs, QUIT 2010   Substance and Sexual Activity   • Alcohol use: No     Alcohol/week: 0.0 oz   • Drug use: No   • Sexual activity: Never     Partners: Female     Comment: , two sons, 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   Lifestyle   • Physical activity     Days per week: Not on file     Minutes per session: Not on file   • Stress: Not on file   Relationships   • Social connections     Talks on phone: Not on file     Gets together: Not on file     Attends [employer] service: Not on file     Active member of club or organization: Not on file     Attends meetings of clubs or organizations: Not on file     Relationship status: Not on file   • Intimate partner violence     Fear of current or ex partner: Not on file     Emotionally abused: Not on file     Physically abused: Not on file     Forced sexual activity: Not on file   Other Topics Concern   • Not on file   Social History Narrative   • Not on file       Family History   Problem Relation Age of Onset   • Stroke Mother    • Hypertension Mother    • Lung Disease Father         Emphysema, resp failure   • Genitourinary () Problems Maternal Aunt         hematuria   • Hypertension Brother        Current Outpatient Medications on File Prior to Visit   Medication Sig Dispense Refill   • albuterol 108 (90 Base) MCG/ACT Aero Soln inhalation aerosol Inhale 2 Puffs by mouth every four hours as needed for Shortness of Breath. 1 Inhaler 0   • rosuvastatin (CRESTOR) 40 MG tablet Take 1 Tab by mouth every evening. 30 Tab 11   • clopidogrel (PLAVIX) 75 MG Tab Take 75 mg by mouth every evening.     • tamsulosin (FLOMAX) 0.4 MG capsule Take 0.8 mg by mouth every day.     • ROPINIRole (REQUIP) 0.5 MG Tab TAKE TWO TABLETS BY MOUTH  DAILY 180 Tab 2   • omeprazole (PRILOSEC) 40 MG delayed-release capsule TAKE ONE CAPSULE BY MOUTH DAILY 30 Cap 2   • Fluticasone-Umeclidin-Vilant (TRELEGY ELLIPTA) 100-62.5-25 MCG/INH AEROSOL POWDER, BREATH ACTIVATED Inhale 1 Inhaler by mouth every day. 1 Each 6   • montelukast (SINGULAIR) 10 MG Tab Take 1 Tab by mouth every evening. 90 Tab 3   • fluticasone (FLONASE) 50 MCG/ACT nasal spray Spray 1-2 Sprays in nose every day. Each nostril. 1 Bottle 6   • traZODone (DESYREL) 150 MG Tab Take 150-300 mg by mouth every bedtime.     • B Complex-C-Folic Acid (DIALYVITE TABLET) Tab TAKE ONE TABLET BY MOUTH DAILY 90 Tab 3   • Calcium Acetate, Phos Binder, 667 MG Cap TAKE 3 CAPSULES BY MOUTH WITH MEALS (3 MEALS) AND 2 CAPSULES WITH SNACKS (2 SNACKS) 420 Cap 11   • torsemide (DEMADEX) 10 MG tablet TAKE THREE TABLETS BY MOUTH DAILY 270 Tab 3   • levalbuterol (XOPENEX) 1.25 MG/3ML Nebu Soln 3 mL by Nebulization route every four hours as needed for Shortness of Breath. 120 Bullet 11   • gabapentin (NEURONTIN) 300 MG Cap Take 300 mg by mouth 3 times a day.       No current facility-administered medications on file prior to visit.        Patient has no known allergies.      ROS:   Review of Systems   Constitutional: Positive for malaise/fatigue. Negative for chills, diaphoresis, fever and weight loss.   HENT: Negative for congestion, ear discharge, ear pain, hearing loss, nosebleeds, sinus pain, sore throat and tinnitus.    Eyes: Negative for blurred vision, double vision, photophobia, pain, discharge and redness.   Respiratory: Positive for cough and shortness of breath. Negative for hemoptysis, sputum production, wheezing and stridor.    Cardiovascular: Negative for chest pain, palpitations, orthopnea, claudication, leg swelling and PND.   Gastrointestinal: Negative for abdominal pain, constipation, diarrhea, heartburn, nausea and vomiting.   Genitourinary: Negative for dysuria and urgency.   Musculoskeletal: Negative for back  "pain, falls, joint pain, myalgias and neck pain.   Skin: Negative for itching and rash.   Neurological: Negative for dizziness, tremors, speech change, focal weakness, weakness and headaches.   Endo/Heme/Allergies: Negative for environmental allergies.   Psychiatric/Behavioral: Negative for depression.       /60 (BP Location: Left arm, Patient Position: Sitting, BP Cuff Size: Adult)   Pulse (!) 106   Temp 37.3 °C (99.1 °F) (Temporal)   Resp 16   Ht 1.715 m (5' 7.5\")   Wt 71.2 kg (157 lb)   SpO2 92%   Physical Exam  Vitals signs reviewed.   Constitutional:       General: He is not in acute distress.     Appearance: Normal appearance. He is normal weight.   HENT:      Head: Normocephalic and atraumatic.      Right Ear: External ear normal.      Left Ear: External ear normal.      Nose: Nose normal. No congestion.      Mouth/Throat:      Mouth: Mucous membranes are moist.      Pharynx: Oropharynx is clear. No oropharyngeal exudate.   Eyes:      General: No scleral icterus.     Extraocular Movements: Extraocular movements intact.      Conjunctiva/sclera: Conjunctivae normal.      Pupils: Pupils are equal, round, and reactive to light.   Neck:      Musculoskeletal: Normal range of motion and neck supple.   Cardiovascular:      Rate and Rhythm: Regular rhythm. Tachycardia present.      Heart sounds: Murmur present. No gallop.       Comments: Harsh, late peaking systolic murmur  Pulmonary:      Effort: Pulmonary effort is normal. No respiratory distress.      Breath sounds: No wheezing.      Comments: Decreased bs roughly 1/3 up posterior R lung field  Abdominal:      General: There is no distension.      Palpations: Abdomen is soft.   Musculoskeletal: Normal range of motion.      Right lower leg: No edema.      Left lower leg: No edema.   Skin:     General: Skin is warm and dry.      Findings: No rash.   Neurological:      Mental Status: He is alert and oriented to person, place, and time.      Cranial Nerves: " No cranial nerve deficit.   Psychiatric:         Mood and Affect: Mood normal.         PFTs as reviewed by me personally:     Imagaing as reviewed by me personally:  As per hPI    Assessment:  1. Pleural effusion  DX-CHEST-2 VIEWS   2. ESRD (end stage renal disease) (HCC)     3. Aortic valve stenosis, etiology of cardiac valve disease unspecified     4. Chronic obstructive pulmonary disease, unspecified COPD type (HCC)     5. Anemia, unspecified type     6. Former smoker         Plan:  1. This is recurrent and I suspect multifactorial.  There is risk for underlying malignancy given hx of tobacco use and we have not had good CT chest to evaluate lung parenchyma in part because he is to some extent chronically volume overloaded due to cardiac and renal dz.  I have ordered repeat CXR and requested imaging from [place].  There does not appear to be any clear reason to repeat thoracentesis urgently and given recurrence, my thought would be to only repeat if he is symptomatic.  We may need to push for TAVR to see if this can help resolve his effusion after which a more thorough pulmonary evaluation can be done.   2. Chronic.  As per above, volume status is difficult given renal and cardiac dz. I think we need to correct his AV to see how much of his current condition improves.  Anemia is also likely contributing.  3. This is severe per report although I do not have interpretation of most recent TTE. I do think he would benefit from replacement if cardiology agrees that some of his issues with volume status could be improved.  4. This is relatively mild from a sx standpoint. I have provided free, one month trial of symbicort until we can get more trelegy samples. Pt is tobacco free and up to date on vaccines.   5. This is multifactorial in the setting of AOCD, macrocytosis and acute blood loss.  Per pt he has been referred to heme.  I placed a referral myself.  6. Pt is tobacco free and up to date on vaccines. He does  not strictly qualify for lung Ca screening but given recurrent pleural effusion, I would recommend CT chest to evaluate lung parenchyma.  Requesting records from [facility].  Return in about 4 weeks (around 4/14/2020) for cxr.

## 2020-03-17 NOTE — DOCUMENTATION QUERY
[facility]                                                                       Query Response Note      PATIENT:               EDVIN GREEN  ACCT #:                  [id number]  MRN:                     [id number]  :                      1940  ADMIT DATE:       3/5/2020 8:23 AM  DISCH DATE:        3/7/2020 2:46 PM  RESPONDING  PROVIDER #:        [id number]           QUERY TEXT:    Please clarify in documentation the relationship, if any, between pleural effusion and chronic systolic heart failure.    The patient's Clinical Indicators include:  patient admitted with transudative pleural effusion.   History and physical report and consult report states chronic systolic heart failure and he has been having problems with recurrent pleural effusion on the right    3/6 PN states  Transudative pleural effusion- (present on admission)  Likely etiology ESRD and aortic stenosis    Discharge summary states transudative pleural effusion  Options provided:   -- Pleural effusion due to or associated with chronic systolic heart failure   -- Unrelated to each other   -- Unable to determine      Query created by: Vijay Zafar on 3/17/2020 10:22 AM    RESPONSE TEXT:    Pleural effusion due to or associated with chronic systolic heart failure          Electronically signed by:  JUANJOSE PAZ MD 3/17/2020 1:29 PM

## 2020-03-19 ENCOUNTER — HOSPITAL ENCOUNTER (OUTPATIENT)
Dept: LAB | Facility: MEDICAL CENTER | Age: 80
End: 2020-03-19
Attending: FAMILY MEDICINE
Payer: MEDICARE

## 2020-03-19 ENCOUNTER — HOSPITAL ENCOUNTER (OUTPATIENT)
Dept: RADIOLOGY | Facility: MEDICAL CENTER | Age: 80
End: 2020-03-19
Attending: INTERNAL MEDICINE
Payer: MEDICARE

## 2020-03-19 DIAGNOSIS — Q27.30 AVM (ARTERIOVENOUS MALFORMATION): ICD-10-CM

## 2020-03-19 DIAGNOSIS — N18.6 ANEMIA IN CHRONIC KIDNEY DISEASE, ON CHRONIC DIALYSIS (HCC): ICD-10-CM

## 2020-03-19 DIAGNOSIS — Z99.2 ANEMIA IN CHRONIC KIDNEY DISEASE, ON CHRONIC DIALYSIS (HCC): ICD-10-CM

## 2020-03-19 DIAGNOSIS — D63.1 ANEMIA IN CHRONIC KIDNEY DISEASE, ON CHRONIC DIALYSIS (HCC): ICD-10-CM

## 2020-03-19 DIAGNOSIS — J90 PLEURAL EFFUSION: ICD-10-CM

## 2020-03-19 LAB
ERYTHROCYTE [DISTWIDTH] IN BLOOD BY AUTOMATED COUNT: 62.4 FL (ref 35.9–50)
HCT VFR BLD AUTO: 31 % (ref 42–52)
HGB BLD-MCNC: 9 G/DL (ref 14–18)
MCH RBC QN AUTO: 30.1 PG (ref 27–33)
MCHC RBC AUTO-ENTMCNC: 29 G/DL (ref 33.7–35.3)
MCV RBC AUTO: 103.7 FL (ref 81.4–97.8)
PLATELET # BLD AUTO: 185 K/UL (ref 164–446)
PMV BLD AUTO: 10 FL (ref 9–12.9)
RBC # BLD AUTO: 2.99 M/UL (ref 4.7–6.1)
WBC # BLD AUTO: 3.3 K/UL (ref 4.8–10.8)

## 2020-03-19 PROCEDURE — 71046 X-RAY EXAM CHEST 2 VIEWS: CPT

## 2020-03-19 PROCEDURE — 36415 COLL VENOUS BLD VENIPUNCTURE: CPT

## 2020-03-19 PROCEDURE — 85027 COMPLETE CBC AUTOMATED: CPT

## 2020-03-24 DIAGNOSIS — J90 PLEURAL EFFUSION: ICD-10-CM

## 2020-03-24 NOTE — PROGRESS NOTES
Spoke with patient over the weekend regarding chest x-ray showing recurrent right-sided pleural effusion large in size.  Patient had no increase in symptoms other than chronic cough and baseline shortness of breath.  He opted to wait through the weekend as he was going to see Dr. Godoy today at [place].  Patient is being evaluated for aortic valve replacement via TAVR.  Given multiple comorbidities Dr. Godoy recommended thoracentesis and follow-up with hematology for current issues with anemia.  Unfortunately I am unable to contact interventional radiology to schedule ultrasound-guided thoracentesis however patient is willing to go to emergency department for outpatient procedure.  I will contact the emergency department tomorrow a.m. prior to patient arrival so they know to expect him.  Patient is aware of and okay with the plan.

## 2020-03-25 ENCOUNTER — HOSPITAL ENCOUNTER (EMERGENCY)
Facility: MEDICAL CENTER | Age: 80
End: 2020-03-25
Attending: EMERGENCY MEDICINE
Payer: MEDICARE

## 2020-03-25 ENCOUNTER — APPOINTMENT (OUTPATIENT)
Dept: RADIOLOGY | Facility: MEDICAL CENTER | Age: 80
End: 2020-03-25
Attending: EMERGENCY MEDICINE
Payer: MEDICARE

## 2020-03-25 VITALS
TEMPERATURE: 99 F | RESPIRATION RATE: 16 BRPM | OXYGEN SATURATION: 97 % | BODY MASS INDEX: 24.57 KG/M2 | SYSTOLIC BLOOD PRESSURE: 132 MMHG | DIASTOLIC BLOOD PRESSURE: 72 MMHG | HEART RATE: 91 BPM | WEIGHT: 156.53 LBS | HEIGHT: 67 IN

## 2020-03-25 DIAGNOSIS — J90 PLEURAL EFFUSION: ICD-10-CM

## 2020-03-25 LAB
AMYLASE FLD-CCNC: 107 U/L
APPEARANCE FLD: NORMAL
APTT PPP: 36 SEC (ref 24.7–36)
BASOPHILS NFR FLD: 8 %
BODY FLD TYPE: 71
BODY FLD TYPE: NORMAL
BODY FLD TYPE: NORMAL
COLOR FLD: NORMAL
CSF COMMENTS 1658: NORMAL
ERYTHROCYTE [DISTWIDTH] IN BLOOD BY AUTOMATED COUNT: 60.3 FL (ref 35.9–50)
GLUCOSE FLD-MCNC: 77 MG/DL
HCT VFR BLD AUTO: 26 % (ref 42–52)
HGB BLD-MCNC: 7.4 G/DL (ref 14–18)
INR PPP: 1.12 (ref 0.87–1.13)
LDH FLD L TO P-CCNC: 314 U/L
MCH RBC QN AUTO: 29.7 PG (ref 27–33)
MCHC RBC AUTO-ENTMCNC: 28.5 G/DL (ref 33.7–35.3)
MCV RBC AUTO: 104.4 FL (ref 81.4–97.8)
NEUTROPHILS NFR FLD: 3 %
PH FLD: 8 [PH]
PLATELET # BLD AUTO: 162 K/UL (ref 164–446)
PMV BLD AUTO: 10.5 FL (ref 9–12.9)
PROT FLD-MCNC: 3.4 G/DL
PROTHROMBIN TIME: 14.6 SEC (ref 12–14.6)
RBC # BLD AUTO: 2.49 M/UL (ref 4.7–6.1)
RBC # FLD: 8 CELLS/UL
WBC # BLD AUTO: 3.4 K/UL (ref 4.8–10.8)
WBC # FLD: 836 CELLS/UL

## 2020-03-25 PROCEDURE — 83615 LACTATE (LD) (LDH) ENZYME: CPT

## 2020-03-25 PROCEDURE — C1729 CATH, DRAINAGE: HCPCS

## 2020-03-25 PROCEDURE — 84157 ASSAY OF PROTEIN OTHER: CPT

## 2020-03-25 PROCEDURE — 85027 COMPLETE CBC AUTOMATED: CPT

## 2020-03-25 PROCEDURE — 85610 PROTHROMBIN TIME: CPT

## 2020-03-25 PROCEDURE — 89051 BODY FLUID CELL COUNT: CPT

## 2020-03-25 PROCEDURE — 82945 GLUCOSE OTHER FLUID: CPT

## 2020-03-25 PROCEDURE — 87205 SMEAR GRAM STAIN: CPT

## 2020-03-25 PROCEDURE — 85730 THROMBOPLASTIN TIME PARTIAL: CPT

## 2020-03-25 PROCEDURE — 71045 X-RAY EXAM CHEST 1 VIEW: CPT | Mod: XU

## 2020-03-25 PROCEDURE — 83986 ASSAY PH BODY FLUID NOS: CPT

## 2020-03-25 PROCEDURE — 87070 CULTURE OTHR SPECIMN AEROBIC: CPT

## 2020-03-25 PROCEDURE — 87015 SPECIMEN INFECT AGNT CONCNTJ: CPT

## 2020-03-25 PROCEDURE — 99283 EMERGENCY DEPT VISIT LOW MDM: CPT

## 2020-03-25 PROCEDURE — 82150 ASSAY OF AMYLASE: CPT

## 2020-03-25 ASSESSMENT — FIBROSIS 4 INDEX: FIB4 SCORE: 1.77

## 2020-03-25 NOTE — ED PROVIDER NOTES
"ED Provider Note    CHIEF COMPLAINT  Chief Complaint   Patient presents with   • Sent by MD     sent by Dr. Black for thoracentesis to be done, pt c/o SOB and cough, weakness.        HPI  Parmjit Castelan is a 79 y.o. male who presents with difficulty with breathing.  The patient has a known history of recurrent pleural effusions.  This is suspected to be from his dialysis dependent renal failure as well as his severe aortic stenosis.  The patient's primary care provider has been unable to schedule the patient for outpatient thoracentesis.  Therefore the patient was sent to the emergency department for thoracentesis.  He has not had any associated fevers.  The patient does have a chronic cough.  He has had some increased work of breathing that does seem to be worse with laying flat.  He has not any chest pain.  The patient does not have any pain or swelling to his lower extremities.    REVIEW OF SYSTEMS  See [facility] for further details. All other systems are negative.     PAST MEDICAL HISTORY  Past Medical History:   Diagnosis Date   • CHF (congestive heart failure), NYHA class II, chronic, systolic ([facility]) E F30 in setting of atrial flutter 6/13/2019   • Atrial flutter ([facility]) 6/12/2019   • Bronchitis 12/25/2018   • Arterial leg ulcer ([facility]) 11/8/2018   • Primary insomnia 3/22/2018   • Chronic respiratory failure with hypoxia ([facility]) 5/8/2016   • Leg pain, bilateral 8/10/2015   • Peripheral vascular disease ([facility]) 8/10/2015   • Basal cell carcinoma of left cheek 3/26/2015   • CAD (coronary artery disease) 2/20/2014   • CKD (chronic kidney disease) stage 4, GFR 15-29 ml/min ([facility]) 1/15/2010    end stage renal disease   • BPH 7/14/2009   • Anesthesia     \"needs more sedation\" (can sometimes hear MD during procedure)    • Anticoagulation monitoring, special range    • Aortic stenosis     mod AS- concern for low flow severe AS with rEFof 30%   • CATARACT     sanjuanita surgery complete   • COPD (chronic obstructive pulmonary disease) ([facility]) "     wears 2.5 L o2 sometimes when he sleeps   • Detached retina    • Dialysis     m,w,f [facility] [place]   • EMPHYSEMA    • Glaucoma     Early stage   • Gout    • Heart burn     occas   • Heart murmur     maria esther lee cardiologist   • Hypertension    • Indigestion     occas   • Kidney cyst    • On supplemental oxygen therapy    • Pneumonia    • Proteinuria    • PVD (peripheral vascular disease) ([facility])    • Sleep apnea     O2 PER CANULA  AT NIGHT 2l/m       FAMILY HISTORY  [unfilled]    SOCIAL HISTORY  Social History     Socioeconomic History   • Marital status:      Spouse name: Not on file   • Number of children: Not on file   • Years of education: Not on file   • Highest education level: Not on file   Occupational History   • Not on file   Social Needs   • Financial resource strain: Not on file   • Food insecurity     Worry: Not on file     Inability: Not on file   • Transportation needs     Medical: Not on file     Non-medical: Not on file   Tobacco Use   • Smoking status: Former Smoker     Packs/day: 1.00     Years: 40.00     Pack years: 40.00     Types: Cigarettes     Last attempt to quit: 2009     Years since quittin.2   • Smokeless tobacco: Never Used   • Tobacco comment: 1 pk a day for 35 yrs, QUIT 2010   Substance and Sexual Activity   • Alcohol use: No     Alcohol/week: 0.0 oz   • Drug use: No   • Sexual activity: Never     Partners: Female     Comment: , two sons, 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   Lifestyle   • Physical activity     Days per week: Not on file     Minutes per session: Not on file   • Stress: Not on file   Relationships   • Social connections     Talks on phone: Not on file     Gets together: Not on file     Attends Zoroastrianism service: Not on file     Active member of club or organization: Not on file     Attends meetings of clubs or organizations: Not on file     Relationship status: Not on file   • Intimate partner violence     Fear of current or ex partner: Not  on file     Emotionally abused: Not on file     Physically abused: Not on file     Forced sexual activity: Not on file   Other Topics Concern   • Not on file   Social History Narrative   • Not on file       SURGICAL HISTORY  Past Surgical History:   Procedure Laterality Date   • PB COLONOSCOPY,FLEX,W/CONTROL, BLEEDING N/A 2/24/2020    Procedure: COLONOSCOPY, WITH ARGON PLASMA COAGULATION;  Surgeon: Juan Matthews M.D.;  Location: SURGERY SAME DAY [facility];  Service: Gastroenterology   • GASTROSCOPY W/PUSH ENTERSCOPY N/A 2/24/2020    Procedure: GASTROSCOPY, WITH PUSH ENTEROSCOPY;  Surgeon: Juan Matthews M.D.;  Location: SURGERY SAME DAY [facility];  Service: Gastroenterology   • PB COLONOSCOPY,DIAGNOSTIC N/A 2/23/2020    Procedure: COLONOSCOPY;  Surgeon: Enrique Child D.O.;  Location: SURGERY [facility];  Service: Gastroenterology   • GASTROSCOPY N/A 2/23/2020    Procedure: GASTROSCOPY;  Surgeon: Enrique Child D.O.;  Location: SURGERY [facility];  Service: Gastroenterology   • CAPSULE ENDOSCOPY ADMINISTRATION N/A 2/20/2020    Procedure: ADMINISTRATION, CAPSULE, FOR CAPSULE ENDOSCOPY;  Surgeon: Gucci Westbrook M.D.;  Location: ENDOSCOPY [facility];  Service: Gastroenterology   • CAPSULE ENDOSCOPY RETRIEVAL  2/20/2020    Procedure: CAPSULE ENDOSCOPY RETRIEVAL;  Surgeon: Gucci Westbrook M.D.;  Location: ENDOSCOPY [facility];  Service: Gastroenterology   • PB SMALL BOWEL ENDOSCOPY,PAST 2ND DUOD  2/19/2020        • COLONOSCOPY - ENDO  2/19/2020        • PB COLONOSCOPY,DIAGNOSTIC  2/19/2020    Procedure: COLONOSCOPY;  Surgeon: Gucci Westbrook M.D.;  Location: SURGERY [facility];  Service: Gastroenterology   • GASTROSCOPY  2/19/2020    Procedure: GASTROSCOPY;  Surgeon: Gucci Westbrook M.D.;  Location: SURGERY [facility];  Service: Gastroenterology   • GASTROSCOPY-ENDO  2/7/2020    Procedure: GASTROSCOPY;  Surgeon: Jong Carrasquillo M.D.;  Location: ENDOSCOPY [facility];  Service: Gastroenterology   • STENT PLACEMENT  01/26/2020    lda   • AV FISTULA CREATION Right 8/6/2019    Procedure: CREATION, AV FISTULA -  RIGHT ARM  ,  and Ligation of Left Arm Fistula;  Surgeon: Sera Peters M.D.;  Location: SURGERY [facility];  Service: General   • CATH PLACEMENT Right 8/6/2019    Procedure: INSERTION, CATHETER - PERMA ,;  Surgeon: Sera Peters M.D.;  Location: SURGERY [facility];  Service: General   • JUSTIN  6/13/2019    Procedure: ECHOCARDIOGRAM, TRANSESOPHAGEAL;  Surgeon: Harvinder Hoang M.D.;  Location: [facility];  Service: Cardiac   • CARDIOVERSION  6/13/2019    Procedure: CARDIOVERSION;  Surgeon: Harvinder Hoang M.D.;  Location: [facility];  Service: Cardiac   • AV FISTULA CREATION Right 2/21/2019    Procedure: AV FISTULA CREATION - ARM;  Surgeon: David Cason M.D.;  Location: SURGERY [facility];  Service: General   • FEMORAL ENDARTERECTOMY Left 1/25/2019    Procedure: FEMORAL ENDARTERECTOMY;  Surgeon: David Cason M.D.;  Location: SURGERY [facility];  Service: General   • FEMORAL POPLITEAL BYPASS Left 1/25/2019    Procedure: LEFT FEMORAL POPLITEAL POLYTETRAFLUOROETHYLENE ePTFE(PROPATEN VASCULAR GRAFT) BYPASS;  Surgeon: David Cason M.D.;  Location: [facility];  Service: General   • ANGIOGRAM Left 1/25/2019    Procedure: LEFT LEG ANGIOGRAM;  Surgeon: David Cason M.D.;  Location: SURGERY [facility];  Service: General   • ENDOSCOPY PROCEDURE  3/21/2018    Procedure: ENDOSCOPY PROCEDURE/UPPER;  Surgeon: Enrique Child D.O.;  Location: SURGERY [facility];  Service: Gastroenterology   • COLONOSCOPY - ENDO  8/15/2016    Procedure: COLONOSCOPY - ENDO;  Surgeon: Mane Whatley M.D.;  Location: ENDOSCOPY [facility];  Service:    • GASTROSCOPY WITH BIOPSY  8/13/2016    Procedure: GASTROSCOPY WITH BIOPSY;  Surgeon: Jorge Leavitt M.D.;  Location: ENDOSCOPY [facility];  Service:    • RECOVERY  12/23/2015    Procedure: VASCULAR CASE-BLANKA-LEFT ARM FISTULOGRAM WITH ANGIOPLASTY;  Surgeon: Elmo Surgery;  Location: SURGERY PRE-POST PROC UNIT [facility];  Service:    • RECOVERY  3/24/2015    Performed by [facility] at SURGERY PRE-POST PROC UNIT [facility]   • RECOVERY  7/29/2014    Performed by [facility] at SURGERY SAME DAY [facility]   • RECOVERY  3/24/2014    Performed by [facility] at SURGERY [facility]   • RECOVERY  12/17/2013    Performed by [facility] at SURGERY SAME DAY [facility]   • RECOVERY  7/2/2013    Performed by [facility] at SURGERY SAME DAY [facility]   • AV FISTULA THROMBOLYSIS  7/2/2013    Performed by David Cason M.D. at SURGERY [facility]   • RECOVERY  1/29/2013    Performed by [facility] at SURGERY SAME DAY [facility]   • RECOVERY  7/23/2012    Performed by SURGERY, IR[facility]RECOVERY at SURGERY SAME DAY [facility]   • VITRECTOMY POSTERIOR  10/11/2011    Performed by NAHUM GE at SURGERY SAME DAY [facility]   • RECOVERY  8/12/2011    Performed by SURGERY, IR-RECOVERY at SURGERY SAME DAY [facility]   • VITRECTOMY POSTERIOR  1/18/2011    Performed by NAHUM GE at SURGERY SAME DAY [facility]   • SCLERAL BUCKLING  1/18/2011    Performed by NAHUM GE at SURGERY SAME DAY [facility]   • AV FISTULOGRAM  9/17/2010    Performed by DAVID CASON at SURGERY [facility]S   • ANGIOPLASTY BALLOON  9/17/2010    Performed by DAVID CASON at SURGERY [facility] ORS   • AV FISTULOGRAM  7/23/2010    Performed by DAVID CASON at SURGERY [facility]S   • ANGIOPLASTY BALLOON  7/23/2010    Performed by DAVID CASON at SURGERY [facility] ORS   • AV FISTULA REVISION  2/19/2010    Performed by WILLIE HATCH at SURGERY [facility]S   • AV FISTULA CREATION  2/12/2010    Performed by DAVID CASON at SURGERY [facility]   • CATARACT PHACO  "WITH IOL  4/8/08    Performed by STACEY PHILLIPS at SURGERY SAME DAY [facility] ORS   • OTHER ORTHOPEDIC SURGERY  1998    right toe for facititis       CURRENT MEDICATIONS  Home Medications    **Home medications have not yet been reviewed for this encounter**         ALLERGIES  No Known Allergies    PHYSICAL EXAM  VITAL SIGNS: /63   Pulse (!) 109   Temp 37.3 °C (99.1 °F) (Temporal)   Resp 18   Ht 1.702 m (5' 7\")   Wt 71 kg (156 lb 8.4 oz)   SpO2 95%   BMI 24.52 kg/m²       Constitutional: Chronically ill in appearance but no acute distress  HENT: Normocephalic, Atraumatic, Bilateral external ears normal, Oropharynx moist, No oral exudates, Nose normal.   Eyes: PERRLA, EOMI, Conjunctiva normal, No discharge.   Neck: Normal range of motion, No tenderness, Supple, No stridor.   Lymphatic: No lymphadenopathy noted.   Cardiovascular: Normal heart rate, Normal rhythm, No murmurs, No rubs, No gallops.   Thorax & Lungs: Significantly diminished on the right compared to the left, No respiratory distress, No wheezing, No chest tenderness.   Abdomen: Bowel sounds normal, Soft, No tenderness, No masses, No pulsatile masses.   Skin: Warm, Dry, No erythema, No rash.   Back: No tenderness, No CVA tenderness.   Extremities: Intact distal pulses, No edema, No tenderness, No cyanosis, No clubbing.    Neurologic: Alert & oriented x 3, Normal motor function, Normal sensory function, No focal deficits noted.   Psychiatric: Affect normal, Judgment normal, Mood normal.       RADIOLOGY/PROCEDURES  DX-CHEST-PORTABLE (1 VIEW)   Final Result      1.  No evidence of pneumothorax postthoracentesis.      2.  Decrease in right pleural effusion.      3.  Cardiomegaly with interstitial prominence.      4.  Nodular density within the right lung base likely representing round atelectasis.      US-THORACENTESIS PUNCTURE RIGHT   Final Result      1. Ultrasound guided right sided diagnostic and therapeutic thoracentesis.      2. 1740 mL of " fluid withdrawn.      IR-THORACENTESIS PUNCTURE RIGHT    (Results Pending)         COURSE & MEDICAL DECISION MAKING  Pertinent Labs & Imaging studies reviewed. (See chart for details)  This is a 79-year-old male who presents the emerge department difficulty with breathing.  This is secondary to a recurrent pleural effusion most likely from his chronic renal failure as well as his aortic stenosis.  We are asked by the primary care provider to have a thoracentesis from a therapeutic standpoint.  Diagnostic orders were placed and are pending but the patient needs to be discharged for dialysis and therefore he will be discharged.  The results to be followed up by myself to make sure there is no infection.  This would be unlikely based on the patient's history.    FINAL IMPRESSION  1.  Pleural effusion  2.  Thoracentesis per radiology     Disposition  The patient will be discharged in stable condition      Electronically signed by: Wesley Pace M.D., 3/25/2020 8:20 AM

## 2020-03-25 NOTE — ED NOTES
"Pt and wife upset \"this is taking too long we have placed to go\".  Pt and wife educated on process. Updated on plan of care.   "

## 2020-03-25 NOTE — PROGRESS NOTES
Went to ER to set up for Thoracentesis but INR not available, 1st sample clotted. Patient on Plavix

## 2020-03-25 NOTE — ED TRIAGE NOTES
"Chief Complaint   Patient presents with   • Sent by MD     sent by Dr. Black for thoracentesis to be done, pt c/o SOB and cough, weakness.      /63   Pulse (!) 109   Temp 37.3 °C (99.1 °F) (Temporal)   Resp 18   Ht 1.702 m (5' 7\")   Wt 71 kg (156 lb 8.4 oz)   SpO2 95%   BMI 24.52 kg/m²     Pt BIB spouse for above concern, w/c used in triage, pt A&Ox4.    Pt and spouse screened for COVID-19, do not meet positive COVID-19 screen criteria.  "

## 2020-03-25 NOTE — ED NOTES
ERP at bedside. Pt agrees with plan of care discussed by ERP. AIDET acknowledged with patient. Blood to lab. Roselia in low position, side rail up for pt safety. Call light within reach. Will continue to monitor.

## 2020-03-26 ENCOUNTER — HOSPITAL ENCOUNTER (OUTPATIENT)
Dept: LAB | Facility: MEDICAL CENTER | Age: 80
End: 2020-03-26
Attending: NURSE PRACTITIONER
Payer: MEDICARE

## 2020-03-26 DIAGNOSIS — K25.3 ACUTE GASTRIC ULCER WITHOUT HEMORRHAGE OR PERFORATION: ICD-10-CM

## 2020-03-26 DIAGNOSIS — I48.92 ATRIAL FLUTTER, UNSPECIFIED TYPE (HCC): ICD-10-CM

## 2020-03-26 DIAGNOSIS — J90 PLEURAL EFFUSION: ICD-10-CM

## 2020-03-26 LAB
BASOPHILS # BLD AUTO: 0.3 % (ref 0–1.8)
BASOPHILS # BLD: 0.01 K/UL (ref 0–0.12)
COMMENT 1642: NORMAL
EOSINOPHIL # BLD AUTO: 0.11 K/UL (ref 0–0.51)
EOSINOPHIL NFR BLD: 3.2 % (ref 0–6.9)
ERYTHROCYTE [DISTWIDTH] IN BLOOD BY AUTOMATED COUNT: 58.8 FL (ref 35.9–50)
GRAM STN SPEC: NORMAL
HCT VFR BLD AUTO: 27.5 % (ref 42–52)
HGB BLD-MCNC: 8.1 G/DL (ref 14–18)
HYPOCHROMIA BLD QL SMEAR: ABNORMAL
IMM GRANULOCYTES # BLD AUTO: 0.01 K/UL (ref 0–0.11)
IMM GRANULOCYTES NFR BLD AUTO: 0.3 % (ref 0–0.9)
LG PLATELETS BLD QL SMEAR: NORMAL
LYMPHOCYTES # BLD AUTO: 0.51 K/UL (ref 1–4.8)
LYMPHOCYTES NFR BLD: 14.7 % (ref 22–41)
MCH RBC QN AUTO: 30 PG (ref 27–33)
MCHC RBC AUTO-ENTMCNC: 29.5 G/DL (ref 33.7–35.3)
MCV RBC AUTO: 101.9 FL (ref 81.4–97.8)
MONOCYTES # BLD AUTO: 0.41 K/UL (ref 0–0.85)
MONOCYTES NFR BLD AUTO: 11.8 % (ref 0–13.4)
NEUTROPHILS # BLD AUTO: 2.41 K/UL (ref 1.82–7.42)
NEUTROPHILS NFR BLD: 69.7 % (ref 44–72)
NRBC # BLD AUTO: 0 K/UL
NRBC BLD-RTO: 0 /100 WBC
PLATELET # BLD AUTO: 177 K/UL (ref 164–446)
PLATELET BLD QL SMEAR: NORMAL
PMV BLD AUTO: 10.1 FL (ref 9–12.9)
POLYCHROMASIA BLD QL SMEAR: NORMAL
RBC # BLD AUTO: 2.7 M/UL (ref 4.7–6.1)
RBC BLD AUTO: PRESENT
SIGNIFICANT IND 70042: NORMAL
SITE SITE: NORMAL
SOURCE SOURCE: NORMAL
WBC # BLD AUTO: 3.5 K/UL (ref 4.8–10.8)

## 2020-03-26 PROCEDURE — 36415 COLL VENOUS BLD VENIPUNCTURE: CPT

## 2020-03-26 PROCEDURE — 85025 COMPLETE CBC W/AUTO DIFF WBC: CPT

## 2020-03-30 LAB
BACTERIA FLD AEROBE CULT: NORMAL
GRAM STN SPEC: NORMAL
SIGNIFICANT IND 70042: NORMAL
SITE SITE: NORMAL
SOURCE SOURCE: NORMAL

## 2020-04-01 LAB
FUNGUS SPEC CULT: NORMAL
SIGNIFICANT IND 70042: NORMAL
SITE SITE: NORMAL
SOURCE SOURCE: NORMAL

## 2020-04-21 LAB — HGB BLD-MCNC: 7.8 G/DL (ref 13–17.7)

## 2020-04-29 ENCOUNTER — TELEPHONE (OUTPATIENT)
Dept: PULMONOLOGY | Facility: HOSPICE | Age: 80
End: 2020-04-29

## 2020-04-29 NOTE — TELEPHONE ENCOUNTER
"Patient called to confirm if he still had an appointment scheduled for 04/30/2020 with Dr. Black and the time after I conformed he said that he won't be coming in. I did offer to rescheduled his appointment or a virtual visit but denied, and he demanded that Dr. Black or his medical assistant call him at his scheduled time to go over his Thoracentesis I did informed him that I would send a message to them it would be up to them and he said \"OF COURSE IT WILL, I NEED THEM TO CALL ME TOMORROW AT 2:10 [phone]\" yelling at me and hung up.     FYI   "

## 2020-04-29 NOTE — TELEPHONE ENCOUNTER
Alejandra Black M.D.  Nanette Euceda, Med Ass't 53 minutes ago (12:09 PM)      He will need a virtual appointment.  It sounds like he did not give you a chance to communicate that.    Routing comment

## 2020-05-12 ENCOUNTER — HOSPITAL ENCOUNTER (OUTPATIENT)
Dept: LAB | Facility: MEDICAL CENTER | Age: 80
End: 2020-05-12
Attending: FAMILY MEDICINE
Payer: MEDICARE

## 2020-05-12 LAB
ANISOCYTOSIS BLD QL SMEAR: ABNORMAL
BASOPHILS # BLD AUTO: 0.7 % (ref 0–1.8)
BASOPHILS # BLD: 0.02 K/UL (ref 0–0.12)
COMMENT 1642: NORMAL
EOSINOPHIL # BLD AUTO: 0.22 K/UL (ref 0–0.51)
EOSINOPHIL NFR BLD: 7.4 % (ref 0–6.9)
ERYTHROCYTE [DISTWIDTH] IN BLOOD BY AUTOMATED COUNT: 66.3 FL (ref 35.9–50)
FERRITIN SERPL-MCNC: 232 NG/ML (ref 22–322)
HCT VFR BLD AUTO: 33.9 % (ref 42–52)
HGB BLD-MCNC: 9.6 G/DL (ref 14–18)
IMM GRANULOCYTES # BLD AUTO: 0 K/UL (ref 0–0.11)
IMM GRANULOCYTES NFR BLD AUTO: 0 % (ref 0–0.9)
IRON SATN MFR SERPL: 26 % (ref 15–55)
IRON SERPL-MCNC: 72 UG/DL (ref 50–180)
LYMPHOCYTES # BLD AUTO: 0.47 K/UL (ref 1–4.8)
LYMPHOCYTES NFR BLD: 15.9 % (ref 22–41)
MACROCYTES BLD QL SMEAR: ABNORMAL
MCH RBC QN AUTO: 28.2 PG (ref 27–33)
MCHC RBC AUTO-ENTMCNC: 28.3 G/DL (ref 33.7–35.3)
MCV RBC AUTO: 99.7 FL (ref 81.4–97.8)
MONOCYTES # BLD AUTO: 0.38 K/UL (ref 0–0.85)
MONOCYTES NFR BLD AUTO: 12.8 % (ref 0–13.4)
NEUTROPHILS # BLD AUTO: 1.87 K/UL (ref 1.82–7.42)
NEUTROPHILS NFR BLD: 63.2 % (ref 44–72)
NRBC # BLD AUTO: 0 K/UL
NRBC BLD-RTO: 0 /100 WBC
OVALOCYTES BLD QL SMEAR: NORMAL
PLATELET # BLD AUTO: 176 K/UL (ref 164–446)
PLATELET BLD QL SMEAR: NORMAL
PMV BLD AUTO: 9.6 FL (ref 9–12.9)
POIKILOCYTOSIS BLD QL SMEAR: NORMAL
POLYCHROMASIA BLD QL SMEAR: NORMAL
RBC # BLD AUTO: 3.4 M/UL (ref 4.7–6.1)
RBC BLD AUTO: PRESENT
TIBC SERPL-MCNC: 278 UG/DL (ref 250–450)
UIBC SERPL-MCNC: 206 UG/DL (ref 110–370)
WBC # BLD AUTO: 3 K/UL (ref 4.8–10.8)

## 2020-05-12 PROCEDURE — 85025 COMPLETE CBC W/AUTO DIFF WBC: CPT

## 2020-05-12 PROCEDURE — 83550 IRON BINDING TEST: CPT

## 2020-05-12 PROCEDURE — 82728 ASSAY OF FERRITIN: CPT

## 2020-05-12 PROCEDURE — 83540 ASSAY OF IRON: CPT

## 2020-05-12 PROCEDURE — 36415 COLL VENOUS BLD VENIPUNCTURE: CPT

## 2020-07-04 ENCOUNTER — HOSPITAL ENCOUNTER (EMERGENCY)
Facility: MEDICAL CENTER | Age: 80
End: 2020-07-04
Attending: EMERGENCY MEDICINE | Admitting: EMERGENCY MEDICINE
Payer: MEDICARE

## 2020-07-04 VITALS
HEIGHT: 68 IN | TEMPERATURE: 98.1 F | WEIGHT: 154.98 LBS | BODY MASS INDEX: 23.49 KG/M2 | RESPIRATION RATE: 20 BRPM | DIASTOLIC BLOOD PRESSURE: 71 MMHG | OXYGEN SATURATION: 99 % | SYSTOLIC BLOOD PRESSURE: 142 MMHG | HEART RATE: 91 BPM

## 2020-07-04 DIAGNOSIS — Z78.9 PROBLEM WITH VASCULAR ACCESS: ICD-10-CM

## 2020-07-04 LAB
ABO GROUP BLD: NORMAL
ANION GAP SERPL CALC-SCNC: 14 MMOL/L (ref 7–16)
ANISOCYTOSIS BLD QL SMEAR: ABNORMAL
APTT PPP: 33 SEC (ref 24.7–36)
BASOPHILS # BLD AUTO: 0.4 % (ref 0–1.8)
BASOPHILS # BLD: 0.02 K/UL (ref 0–0.12)
BLD GP AB SCN SERPL QL: NORMAL
BUN SERPL-MCNC: 37 MG/DL (ref 8–22)
CALCIUM SERPL-MCNC: 10.8 MG/DL (ref 8.4–10.2)
CHLORIDE SERPL-SCNC: 91 MMOL/L (ref 96–112)
CO2 SERPL-SCNC: 28 MMOL/L (ref 20–33)
COMMENT 1642: NORMAL
CREAT SERPL-MCNC: 6.61 MG/DL (ref 0.5–1.4)
EOSINOPHIL # BLD AUTO: 0.16 K/UL (ref 0–0.51)
EOSINOPHIL NFR BLD: 3.5 % (ref 0–6.9)
ERYTHROCYTE [DISTWIDTH] IN BLOOD BY AUTOMATED COUNT: 66.3 FL (ref 35.9–50)
GLUCOSE SERPL-MCNC: 96 MG/DL (ref 65–99)
HCT VFR BLD AUTO: 37.5 % (ref 42–52)
HGB BLD-MCNC: 11.4 G/DL (ref 14–18)
IMM GRANULOCYTES # BLD AUTO: 0.02 K/UL (ref 0–0.11)
IMM GRANULOCYTES NFR BLD AUTO: 0.4 % (ref 0–0.9)
INR PPP: 1 (ref 0.87–1.13)
LYMPHOCYTES # BLD AUTO: 0.55 K/UL (ref 1–4.8)
LYMPHOCYTES NFR BLD: 11.9 % (ref 22–41)
MCH RBC QN AUTO: 30.3 PG (ref 27–33)
MCHC RBC AUTO-ENTMCNC: 30.4 G/DL (ref 33.7–35.3)
MCV RBC AUTO: 99.7 FL (ref 81.4–97.8)
MONOCYTES # BLD AUTO: 0.56 K/UL (ref 0–0.85)
MONOCYTES NFR BLD AUTO: 12.1 % (ref 0–13.4)
NEUTROPHILS # BLD AUTO: 3.31 K/UL (ref 1.82–7.42)
NEUTROPHILS NFR BLD: 71.7 % (ref 44–72)
NRBC # BLD AUTO: 0 K/UL
NRBC BLD-RTO: 0 /100 WBC
OVALOCYTES BLD QL SMEAR: NORMAL
PLATELET # BLD AUTO: 173 K/UL (ref 164–446)
PLATELET BLD QL SMEAR: NORMAL
PMV BLD AUTO: 10.5 FL (ref 9–12.9)
POIKILOCYTOSIS BLD QL SMEAR: NORMAL
POLYCHROMASIA BLD QL SMEAR: NORMAL
POTASSIUM SERPL-SCNC: 4.7 MMOL/L (ref 3.6–5.5)
PROTHROMBIN TIME: 13.3 SEC (ref 12–14.6)
RBC # BLD AUTO: 3.76 M/UL (ref 4.7–6.1)
RBC BLD AUTO: PRESENT
RH BLD: NORMAL
SODIUM SERPL-SCNC: 133 MMOL/L (ref 135–145)
WBC # BLD AUTO: 4.6 K/UL (ref 4.8–10.8)

## 2020-07-04 PROCEDURE — 85610 PROTHROMBIN TIME: CPT

## 2020-07-04 PROCEDURE — 80048 BASIC METABOLIC PNL TOTAL CA: CPT

## 2020-07-04 PROCEDURE — 99284 EMERGENCY DEPT VISIT MOD MDM: CPT

## 2020-07-04 PROCEDURE — 86850 RBC ANTIBODY SCREEN: CPT

## 2020-07-04 PROCEDURE — 85730 THROMBOPLASTIN TIME PARTIAL: CPT

## 2020-07-04 PROCEDURE — 86900 BLOOD TYPING SEROLOGIC ABO: CPT

## 2020-07-04 PROCEDURE — 86901 BLOOD TYPING SEROLOGIC RH(D): CPT

## 2020-07-04 PROCEDURE — 85025 COMPLETE CBC W/AUTO DIFF WBC: CPT

## 2020-07-04 RX ORDER — ACETAMINOPHEN 500 MG
500-1000 TABLET ORAL EVERY 6 HOURS PRN
Status: SHIPPED | COMMUNITY
End: 2020-10-29

## 2020-07-04 RX ORDER — OMEPRAZOLE 40 MG/1
40 CAPSULE, DELAYED RELEASE ORAL DAILY
COMMUNITY
End: 2020-10-29 | Stop reason: SDUPTHER

## 2020-07-04 RX ORDER — TORSEMIDE 10 MG/1
30 TABLET ORAL DAILY
COMMUNITY
End: 2020-10-29 | Stop reason: SDUPTHER

## 2020-07-04 RX ORDER — ROPINIROLE 0.5 MG/1
1 TABLET, FILM COATED ORAL
COMMUNITY
End: 2020-10-29 | Stop reason: SDUPTHER

## 2020-07-04 RX ORDER — MONTELUKAST SODIUM 10 MG/1
10 TABLET ORAL DAILY
COMMUNITY
End: 2020-10-29 | Stop reason: SDUPTHER

## 2020-07-04 ASSESSMENT — FIBROSIS 4 INDEX: FIB4 SCORE: 1.859375

## 2020-07-04 NOTE — ED TRIAGE NOTES
Chief Complaint   Patient presents with   • Vascular Access Problem     bleeding fistula      Pt states he had dialysis yesterday and dressing was placed on his fistula, bleeding was controlled for the first 3 hours but then fistula began bleeding overnight. Pt states he is on plavix. Coban and gauze dressing placed, bleeding controlled at this time.     negative covid screen.

## 2020-07-04 NOTE — DISCHARGE INSTRUCTIONS
Keep the dressing in place for the rest of the weekend, do not remove it and you are going to need to keep your arm at rest and not use it and allow the wound to heal.  If blood soaks completely through the dressing or you have new or worsening symptoms return here and otherwise call your nephrologist first thing Monday morning and they will arrange further care.

## 2020-07-04 NOTE — ED NOTES
Med rec updated and complete  Allergies reviewed  Pt had a list of medications at bedside, went over list of medications and returned list of medications back to pt at bedside.   Pt reports no antibiotics in the last 2 weeks

## 2020-07-04 NOTE — ED NOTES
Bleeding observed from site, controlled with gauze and coban. VSS. CMS intact, bruit and thrill present.

## 2020-07-04 NOTE — ED PROVIDER NOTES
"ED Provider Note    CHIEF COMPLAINT  Chief Complaint   Patient presents with   • Vascular Access Problem     bleeding fistula        HPI  Parmjit Castelan is a 79 y.o. male who presents to the emergency department with continuing bleeding from his right upper arm dialysis AV fistula.  This fistula was placed about a year ago by Dr. Peters and the patient has a previous failed fistula in the left upper extremity.  The patient states that recently this fistula has been problematic it frequently does not stop bleeding after dialysis and 3 weeks ago he saw his nephrologist Dr. Pisano and a fistulogram and angioplasty were performed and despite that the fistula continued to bleed after dialysis and yesterday he was seen in the Access clinic and says that he had a fistulogram angioplasty and a stent was placed and then that afternoon he had dialysis and unfortunately the fistula has been slowly oozing blood since yesterday's dialysis.  The patient is on Plavix and apparently this cannot be discontinued.  He has a history of atrial flutter and aortic stenosis and apparently has been told he needs a TAVR done and he has a history of previous AV fistula thrombosis.    REVIEW OF SYSTEMS no fever chills no chest pain no difficulty breathing no nausea or vomiting or lightheadedness.    PAST MEDICAL HISTORY  Past Medical History:   Diagnosis Date   • Anesthesia     \"needs more sedation\" (can sometimes hear MD during procedure)    • Anticoagulation monitoring, special range    • Aortic stenosis     mod AS- concern for low flow severe AS with rEFof 30%   • Arterial leg ulcer (HCC) 11/8/2018   • Atrial flutter (HCC) 6/12/2019   • Basal cell carcinoma of left cheek 3/26/2015   • BPH 7/14/2009   • Bronchitis 12/25/2018   • CAD (coronary artery disease) 2/20/2014   • CATARACT     sanjuanita surgery complete   • CHF (congestive heart failure), NYHA class II, chronic, systolic (HCC) E F30 in setting of atrial flutter 6/13/2019   • Chronic " respiratory failure with hypoxia ([facility]) 2016   • CKD (chronic kidney disease) stage 4, GFR 15-29 ml/min ([facility]) 1/15/2010    end stage renal disease   • COPD (chronic obstructive pulmonary disease) ([facility])     wears 2.5 L o2 sometimes when he sleeps   • Detached retina    • [facility]   • EMPHYSEMA    • Glaucoma     Early stage   • Gout    • Heart burn     occas   • Heart murmur     maria esther lee cardiologist   • Hypertension    • Indigestion     occas   • Kidney cyst    • Leg pain, bilateral 8/10/2015   • On supplemental oxygen therapy    • Peripheral vascular disease ([facility]) 8/10/2015   • Pneumonia    • Primary insomnia 3/22/2018   • Proteinuria    • PVD (peripheral vascular disease) ([facility])    • Sleep apnea     O2 PER CANULA  AT NIGHT 2l/m       FAMILY HISTORY  Family History   Problem Relation Age of Onset   • Stroke Mother    • Hypertension Mother    • Lung Disease Father         Emphysema, resp failure   • Genitourinary () Problems Maternal Aunt         hematuria   • Hypertension Brother        SOCIAL HISTORY  Social History     Socioeconomic History   • Marital status:      Spouse name: Not on file   • Number of children: Not on file   • Years of education: Not on file   • Highest education level: Not on file   Occupational History   • Not on file   Social Needs   • Financial resource strain: Not on file   • Food insecurity     Worry: Not on file     Inability: Not on file   • Transportation needs     Medical: Not on file     Non-medical: Not on file   Tobacco Use   • Smoking status: Former Smoker     Packs/day: 1.00     Years: 40.00     Pack years: 40.00     Types: Cigarettes     Last attempt to quit: 2009     Years since quittin.5   • Smokeless tobacco: Never Used   • Tobacco comment: 1 pk a day for 35 yrs, QUIT 2010   Substance and Sexual Activity   • Alcohol use: No     Alcohol/week: 0.0 oz   • Drug use: No   • Sexual activity: Never     Partners: Female      Comment: , two sons, 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   Lifestyle   • Physical activity     Days per week: Not on file     Minutes per session: Not on file   • Stress: Not on file   Relationships   • Social connections     Talks on phone: Not on file     Gets together: Not on file     Attends Tenriism service: Not on file     Active member of club or organization: Not on file     Attends meetings of clubs or organizations: Not on file     Relationship status: Not on file   • Intimate partner violence     Fear of current or ex partner: Not on file     Emotionally abused: Not on file     Physically abused: Not on file     Forced sexual activity: Not on file   Other Topics Concern   • Not on file   Social History Narrative   • Not on file       SURGICAL HISTORY  Past Surgical History:   Procedure Laterality Date   • PB COLONOSCOPY,FLEX,W/CONTROL, BLEEDING N/A 2/24/2020    Procedure: COLONOSCOPY, WITH ARGON PLASMA COAGULATION;  Surgeon: Juan Matthews M.D.;  Location: SURGERY SAME DAY [facility];  Service: Gastroenterology   • GASTROSCOPY W/PUSH ENTERSCOPY N/A 2/24/2020    Procedure: GASTROSCOPY, WITH PUSH ENTEROSCOPY;  Surgeon: Juan Matthews M.D.;  Location: SURGERY SAME DAY [facility];  Service: Gastroenterology   • PB COLONOSCOPY,DIAGNOSTIC N/A 2/23/2020    Procedure: COLONOSCOPY;  Surgeon: Enrique Child D.O.;  Location: SURGERY [facility];  Service: Gastroenterology   • GASTROSCOPY N/A 2/23/2020    Procedure: GASTROSCOPY;  Surgeon: Enrique Child D.O.;  Location: SURGERY [facility];  Service: Gastroenterology   • CAPSULE ENDOSCOPY ADMINISTRATION N/A 2/20/2020    Procedure: ADMINISTRATION, CAPSULE, FOR CAPSULE ENDOSCOPY;  Surgeon: Gucci Westbrook M.D.;  Location: ENDOSCOPY [facility];  Service: Gastroenterology   • CAPSULE ENDOSCOPY RETRIEVAL  2/20/2020    Procedure: CAPSULE ENDOSCOPY RETRIEVAL;  Surgeon: Gucci Westbrook M.D.;  Location: ENDOSCOPY [facility];  Service:  Gastroenterology   • PB SMALL BOWEL ENDOSCOPY,PAST 2ND DUOD  2/19/2020        • COLONOSCOPY - ENDO  2/19/2020        • PB COLONOSCOPY,DIAGNOSTIC  2/19/2020    Procedure: COLONOSCOPY;  Surgeon: Gucci Westbrook M.D.;  Location: [facility];  Service: Gastroenterology   • GASTROSCOPY  2/19/2020    Procedure: GASTROSCOPY;  Surgeon: Gucci Westbrook M.D.;  Location: [facility];  Service: Gastroenterology   • GASTROSCOPY-ENDO  2/7/2020    Procedure: GASTROSCOPY;  Surgeon: Jong Carrasquillo M.D.;  Location: ENDOSCOPY [facility];  Service: Gastroenterology   • STENT PLACEMENT  01/26/2020    lda   • AV FISTULA CREATION Right 8/6/2019    Procedure: CREATION, AV FISTULA -  RIGHT ARM  ,  and Ligation of Left Arm Fistula;  Surgeon: Sera Peters M.D.;  Location: [facility];  Service: General   • CATH PLACEMENT Right 8/6/2019    Procedure: INSERTION, CATHETER - PERMA ,;  Surgeon: Sera Peters M.D.;  Location: [facility];  Service: General   • JUSTIN  6/13/2019    Procedure: ECHOCARDIOGRAM, TRANSESOPHAGEAL;  Surgeon: Harvinder Hoang M.D.;  Location: [facility];  Service: Cardiac   • CARDIOVERSION  6/13/2019    Procedure: CARDIOVERSION;  Surgeon: Harvinder Hoang M.D.;  Location: [facility];  Service: Cardiac   • AV FISTULA CREATION Right 2/21/2019    Procedure: AV FISTULA CREATION - ARM;  Surgeon: David Cason M.D.;  Location: [facility];  Service: General   • FEMORAL ENDARTERECTOMY Left 1/25/2019    Procedure: FEMORAL ENDARTERECTOMY;  Surgeon: David Cason M.D.;  Location: [facility];  Service: General   • FEMORAL POPLITEAL BYPASS Left 1/25/2019    Procedure: LEFT FEMORAL POPLITEAL POLYTETRAFLUOROETHYLENE ePTFE(PROPATEN VASCULAR GRAFT) BYPASS;  Surgeon: David Cason M.D.;  Location: [facility];  Service: General   • ANGIOGRAM Left 1/25/2019    Procedure: LEFT LEG  ANGIOGRAM;  Surgeon: David Cason M.D.;  Location: SURGERY [facility];  Service: General   • ENDOSCOPY PROCEDURE  3/21/2018    Procedure: ENDOSCOPY PROCEDURE/UPPER;  Surgeon: Enrique Child D.O.;  Location: SURGERY [facility];  Service: Gastroenterology   • COLONOSCOPY - ENDO  8/15/2016    Procedure: COLONOSCOPY - ENDO;  Surgeon: Mane Whatley M.D.;  Location: ENDOSCOPY [facility];  Service:    • GASTROSCOPY WITH BIOPSY  8/13/2016    Procedure: GASTROSCOPY WITH BIOPSY;  Surgeon: Jorge Leavitt M.D.;  Location: ENDOSCOPY [facility];  Service:    • RECOVERY  12/23/2015    Procedure: VASCULAR CASE-CASON-LEFT ARM FISTULOGRAM WITH ANGIOPLASTY;  Surgeon: [facility] Surgery;  Location: SURGERY PRE-POST PROC UNIT [facility];  Service:    • RECOVERY  3/24/2015    Performed by [facility] at SURGERY PRE-POST PROC UNIT [facility]   • RECOVERY  7/29/2014    Performed by [facility] at SURGERY SAME DAY [facility]   • RECOVERY  3/24/2014    Performed by [facility] at SURGERY [facility]   • RECOVERY  12/17/2013    Performed by [facility] at SURGERY SAME DAY [facility]   • RECOVERY  7/2/2013    Performed by [facility] at SURGERY SAME DAY [facility]   • AV FISTULA THROMBOLYSIS  7/2/2013    Performed by David Cason M.D. at SURGERY [facility]   • RECOVERY  1/29/2013    Performed by [facility] at SURGERY SAME DAY [facility]   • RECOVERY  7/23/2012    Performed by SURGERY, IR-RECOVERY at SURGERY SAME DAY [facility]   • VITRECTOMY POSTERIOR  10/11/2011    Performed by NAHUM GE at SURGERY SAME DAY [facility]S   • RECOVERY  8/12/2011    Performed by SURGERY, IR-RECOVERY at SURGERY SAME DAY [facility]   • VITRECTOMY POSTERIOR  1/18/2011    Performed by NAHUM GE at SURGERY SAME DAY [facility]   • SCLERAL BUCKLING  1/18/2011    Performed by NAHUM GE at SURGERY SAME DAY [facility] ORS   • AV FISTULOGRAM  9/17/2010     "Performed by ALESHIA MOSCOSO at SURGERY [facility] ORS   • ANGIOPLASTY BALLOON  9/17/2010    Performed by ALESHIA MOSCOSO at SURGERY [facility]S   • AV FISTULOGRAM  7/23/2010    Performed by ALESHIA MOSCOSO at SURGERY [facility] ORS   • ANGIOPLASTY BALLOON  7/23/2010    Performed by ALESHIA MOSCOSO at SURGERY [facility] ORS   • AV FISTULA REVISION  2/19/2010    Performed by WILLIE HATCH at SURGERY [facility] ORS   • AV FISTULA CREATION  2/12/2010    Performed by ALESHIA MOSCOSO at SURGERY [facility]S   • CATARACT PHACO WITH IOL  4/8/08    Performed by STACEY PHILLIPS at SURGERY SAME DAY [facility]   • OTHER ORTHOPEDIC SURGERY  1998    right toe for facititis       CURRENT MEDICATIONS  Home Medications     Reviewed by Mee Gambino (Pharmacy Tech) on 07/04/20 at 1104  Med List Status: Complete   Medication Last Dose Status   acetaminophen (TYLENOL) 500 MG Tab 7/3/2020 Active   albuterol 108 (90 Base) MCG/ACT Aero Soln inhalation aerosol 7/4/2020 Active   B Complex-C-Folic Acid (DIALYVITE PO) 7/3/2020 Active   budesonide-formoterol (SYMBICORT) 160-4.5 MCG/ACT Aerosol 7/3/2020 Active   calcium acetate (PHOS-LO) 667 MG Cap 7/3/2020 Active   clopidogrel (PLAVIX) 75 MG Tab 7/3/2020 Active   fluticasone (FLONASE) 50 MCG/ACT nasal spray 7/4/2020 Active   montelukast (SINGULAIR) 10 MG Tab 7/3/2020 Active   omeprazole (PRILOSEC) 40 MG delayed-release capsule 7/4/2020 Active   ROPINIRole (REQUIP) 0.5 MG Tab 7/3/2020 Active   rosuvastatin (CRESTOR) 40 MG tablet 7/3/2020 Active   tamsulosin (FLOMAX) 0.4 MG capsule 7/3/2020 Active   torsemide (DEMADEX) 10 MG tablet 7/3/2020 Active   traZODone (DESYREL) 150 MG Tab 7/4/2020 Active                ALLERGIES  No Known Allergies    PHYSICAL EXAM  VITAL SIGNS: /71   Pulse 91   Temp 36.7 °C (98.1 °F) (Temporal)   Resp 20   Ht 1.727 m (5' 8\")   Wt 70.3 kg (154 lb 15.7 oz)   SpO2 99%   BMI 23.57 kg/m²    Oxygen saturation is " interpreted as adequate  Constitutional: Awake and nontoxic-appearing and in no distress  Cardiovascular: Regular rate and rhythm  Lungs: Clear and equal with no difficulty breathing  Abdomen/Back: Soft and nontender normally active bowel sounds no peritoneal findings  Skin: Warm and dry  Musculoskeletal: No acute bony deformity  Neurologic: Awake verbal moving all extremities and ambulatory  Vascular: There is an AV fistula in the right upper extremity there is a lightly palpable thrill there is a suture in place from yesterday's procedure and proximal to this by about 3 inches is the wound from yesterday's dialysis and when I remove the dressings it very very slowly oozes producing a drop of blood over the wound after about 10 or 15 seconds.  It is not pulsatile or rapid bleeding and the area is not otherwise swollen or ecchymotic and does not appear infected.    Laboratory  CBC shows normal white blood cell count of 4.6 hemoglobin is adequate at 11.4 platelet count is adequate at 173.  The INR was done because of bleeding problem as described above and it is normal at 1.00 and basic metabolic panel shows BUN of 37 and creatinine of 6.61 consistent with the patient's history of chronic renal failure and his electrolytes are unremarkable    MEDICAL DECISION MAKING and DISPOSITION  In the emergency department I placed a very light dressing over the wound and this was secured with lightly wrapped gauze and he did not bleed through this dressing during his time in the ER after discussion with Dr. Enriquez from nephrology were going to let the patient go home I did remove the dressing in place Surgicel over the area to promote blood clotting and on top of that a large amount of gauze to place gentle pressure on the wound and we lightly wrapped it with Coban.  Dr. Enriquez would like the patient to keep this dressing on for the rest of the weekend and not remove it and he has also been advised to keep the arm at rest.  I  have advised him he should really do much activities this weekend he should rest the arm and let this wound heal he is then going to need to follow-up on Monday morning by calling the nephrology office and they will arrange further evaluation and treatment and he is scheduled for dialysis Monday afternoon.  If blood completely soaked through the dressing or he has new or worsening symptoms he is to return here    IMPRESSION  1.  Abnormal bleeding from AV fistula site in the right upper extremity  2.  History of dialysis for chronic renal failure      Electronically signed by: Geovany Moyer M.D., 7/4/2020 11:24 AM

## 2020-07-05 ENCOUNTER — HOSPITAL ENCOUNTER (EMERGENCY)
Facility: MEDICAL CENTER | Age: 80
End: 2020-07-05
Attending: EMERGENCY MEDICINE
Payer: MEDICARE

## 2020-07-05 VITALS
HEART RATE: 90 BPM | DIASTOLIC BLOOD PRESSURE: 62 MMHG | TEMPERATURE: 97.2 F | SYSTOLIC BLOOD PRESSURE: 147 MMHG | OXYGEN SATURATION: 98 % | RESPIRATION RATE: 16 BRPM

## 2020-07-05 DIAGNOSIS — Z78.9 PROBLEM WITH VASCULAR ACCESS: ICD-10-CM

## 2020-07-05 PROCEDURE — 304999 HCHG REPAIR-SIMPLE/INTERMED LEVEL 1

## 2020-07-05 PROCEDURE — 303747 HCHG EXTRA SUTURE

## 2020-07-05 PROCEDURE — 99284 EMERGENCY DEPT VISIT MOD MDM: CPT

## 2020-07-05 NOTE — ED PROVIDER NOTES
ED Provider Note    ED Provider Note    Primary care provider: Martha Light M.D.  Means of arrival: Private vehicle  History obtained from: Patient  History limited by: None    CHIEF COMPLAINT  Chief Complaint   Patient presents with   • Bleeding/Bruising     right arm        HPI  Parmjit Castelan is a 79 y.o. male who presents to the Emergency Department  with continuing bleeding from his right upper arm dialysis AV fistula.      This fistula was placed about a year ago by Dr. Peters and the patient has a previous failed fistula in the left upper extremity.  The patient states that recently this fistula has been problematic it frequently does not stop bleeding after dialysis and 3 weeks ago he saw his nephrologist Dr. Pisano and a fistulogram and angioplasty were performed and despite that the fistula continued to bleed after dialysis and Thursday he was seen in the Access clinic and says that he had a fistulogram angioplasty and a stent was placed and then that afternoon he had dialysis and unfortunately the fistula has been slowly oozing blood since Friday's dialysis.      Patient was seen in the emergency department yesterday and Surgicel was placed over wound.    The patient is on Plavix and apparently this cannot be discontinued.  He has a history of atrial flutter and aortic stenosis and apparently has been told he needs a TAVR done and he has a history of previous AV fistula thrombosis.    REVIEW OF SYSTEMS  Pertinent negatives include no new    PAST MEDICAL HISTORY   has a past medical history of Anesthesia, Anticoagulation monitoring, special range, Aortic stenosis, Arterial leg ulcer (HCC) (11/8/2018), Atrial flutter (HCC) (6/12/2019), Basal cell carcinoma of left cheek (3/26/2015), BPH (7/14/2009), Bronchitis (12/25/2018), CAD (coronary artery disease) (2/20/2014), CATARACT, CHF (congestive heart failure), NYHA class II, chronic, systolic (HCC) E F30 in setting of atrial flutter (6/13/2019), Chronic  respiratory failure with hypoxia ([facility]) (5/8/2016), CKD (chronic kidney disease) stage 4, GFR 15-29 ml/min ([facility]) (1/15/2010), COPD (chronic obstructive pulmonary disease) ([facility]), Detached retina, Dialysis, EMPHYSEMA, Glaucoma, Gout, Heart burn, Heart murmur, Hypertension, Indigestion, Kidney cyst, Leg pain, bilateral (8/10/2015), On supplemental oxygen therapy, Peripheral vascular disease ([facility]) (8/10/2015), Pneumonia, Primary insomnia (3/22/2018), Proteinuria, PVD (peripheral vascular disease) ([facility]), and Sleep apnea.    SURGICAL HISTORY   has a past surgical history that includes cataract phaco with iol (4/8/08); av fistula creation (2/12/2010); av fistula revision (2/19/2010); av fistulogram (7/23/2010); angioplasty balloon (7/23/2010); av fistulogram (9/17/2010); angioplasty balloon (9/17/2010); vitrectomy posterior (1/18/2011); scleral buckling (1/18/2011); recovery (8/12/2011); vitrectomy posterior (10/11/2011); recovery (7/23/2012); recovery (1/29/2013); recovery (7/2/2013); av fistula thrombolysis (7/2/2013); recovery (12/17/2013); recovery (3/24/2014); recovery (7/29/2014); recovery (3/24/2015); recovery (12/23/2015); gastroscopy with biopsy (8/13/2016); colonoscopy - endo (8/15/2016); endoscopy procedure (3/21/2018); femoral endarterectomy (Left, 1/25/2019); femoral popliteal bypass (Left, 1/25/2019); angiogram (Left, 1/25/2019); other orthopedic surgery (1998); av fistula creation (Right, 2/21/2019); lisa (6/13/2019); cardioversion (6/13/2019); av fistula creation (Right, 8/6/2019); cath placement (Right, 8/6/2019); stent placement (01/26/2020); gastroscopy-endo (2/7/2020); small bowel endoscopy,past 2nd duod (2/19/2020); colonoscopy - endo (2/19/2020); colonoscopy,diagnostic (2/19/2020); gastroscopy (2/19/2020); capsule endoscopy administration (N/A, 2/20/2020); capsule endoscopy retrieval (2/20/2020); colonoscopy,diagnostic (N/A, 2/23/2020); gastroscopy (N/A, 2/23/2020); colonoscopy,flex,w/control, bleeding  (N/A, 2020); and gastroscopy w/push enterscopy (N/A, 2020).    SOCIAL HISTORY  Social History     Tobacco Use   • Smoking status: Former Smoker     Packs/day: 1.00     Years: 40.00     Pack years: 40.00     Types: Cigarettes     Last attempt to quit: 2009     Years since quittin.5   • Smokeless tobacco: Never Used   • Tobacco comment: 1 pk a day for 35 yrs, QUIT 2010   Substance Use Topics   • Alcohol use: No     Alcohol/week: 0.0 oz   • Drug use: No      Social History     Substance and Sexual Activity   Drug Use No       FAMILY HISTORY  Family History   Problem Relation Age of Onset   • Stroke Mother    • Hypertension Mother    • Lung Disease Father         Emphysema, resp failure   • Genitourinary () Problems Maternal Aunt         hematuria   • Hypertension Brother        CURRENT MEDICATIONS  Home Medications    **Home medications have not yet been reviewed for this encounter**         ALLERGIES  No Known Allergies    PHYSICAL EXAM    VITAL SIGNS: /62   Pulse 90   Temp 36.2 °C (97.2 °F) (Oral)   Resp 16   SpO2 98%  @[email or web address][[id number]::@  Pulse ox interpretation: I interpret this pulse ox as normal.  Constitutional: Alert in no apparent distress.  HENT: Normocephalic, Atraumatic, Bilateral external ears normal. Nose normal.   Eyes: Pupils are equal and reactive. Conjunctiva normal, non-icteric.   Heart: Regular rate and rythm, no murmurs.    Lungs: Clear to auscultation bilaterally.  Vascular: Palpable thrill in right upper extremity.  Coban over top of wound, blood soaked Coban.  After Coban taken down patient with 0.1 mm area of persistent venous oozing  Skin: Warm, Dry, No erythema, No rash.   Neurologic: Alert, Grossly non-focal.   Psychiatric: Affect normal, Judgment normal, Mood normal, Appears appropriate and not intoxicated.     LABS  Labs Reviewed - No data to display   All labs reviewed by me.    RADIOLOGY  No orders to display     The radiologist's interpretation of all  radiological studies have been reviewed by me.      LACERATION REPAIR PROCEDURE NOTE  The patient's identification was confirmed and consent was obtained.  This procedure was performed by Dr. Hoffman at .  Site: SANIA fistula  Sterile procedures observed  Anesthetic used (type and amt): NA  Suture type/size:.4.0 nylon  Length:0.1mm  # of Sutures: 1  Technique:simple interrupted  Suture was placed by me in the most superficial aspect of the site of oozing and skin approximated.    wound well approximated, site covered with dry, sterile dressing. Patient tolerated procedure well without complications. Instructions for care discussed verbally and patient provided with additional written instructions for homecare and f/u.     COURSE & MEDICAL DECISION MAKING  Nursing notes, VS, PMSFHx reviewed in chart.  I verified that the patient was wearing a mask and I was wearing appropriate PPE every time I entered the room. The patient's mask was on the patient at all times during my encounter except for a brief view of the oropharynx.     5:19 AM Patient seen and examined at bedside.     79 y.o. male p/w CC of bleed from fistula    Differential diagnosis includes is not limited to:    Patient denies any lightheadedness or passing out.  He states that he woke when he noticed his dressing was wet with blood.  He denies any recent accidents or injuries.      500 AM I discussed this case with Dr. Peters who requested I place a suture in the site of continuous bleeding.    I placed a suture with cessation of bleeding.  Sensation and strength intact distally with no change prior to suture placement.  Palpable distal thrill still felt.  Cap refill still intact distally.  Normal range of motion distally.    Counseled patient and wife regarding home hemostasis techniques.  Surgicel applied over top of suture.  Patient will per report to dialysis and suture to be removed by dialysis center at their discretion but patient and wife aware to  have suture removed no later than 10 days from today.    Patient has had high blood pressure while in the emergency department, felt likely secondary to medical condition. Counseled patient to monitor blood pressure at home and follow up with primary care physician.      DISPOSITION:  Patient will be discharged home in stable condition.    FINAL IMPRESSION  1. Problem with vascular access         Electronically signed by: Mahin Hoffman M.D., 7/5/2020 5:19 AM

## 2020-07-05 NOTE — ED NOTES
Discharge instructions provided.  Pt verbalized the understanding of discharge instructions to follow up with PCP and to return to ER if condition worsens.  Pt out of ER without difficulty in wheelchair with spouse at side.

## 2020-08-09 ENCOUNTER — OFFICE VISIT (OUTPATIENT)
Dept: URGENT CARE | Facility: CLINIC | Age: 80
End: 2020-08-09
Payer: MEDICARE

## 2020-08-09 VITALS
HEART RATE: 82 BPM | DIASTOLIC BLOOD PRESSURE: 50 MMHG | SYSTOLIC BLOOD PRESSURE: 112 MMHG | HEIGHT: 68 IN | WEIGHT: 160 LBS | TEMPERATURE: 97.8 F | RESPIRATION RATE: 16 BRPM | OXYGEN SATURATION: 96 % | BODY MASS INDEX: 24.25 KG/M2

## 2020-08-09 DIAGNOSIS — S61.411A LACERATION OF RIGHT HAND WITHOUT FOREIGN BODY, INITIAL ENCOUNTER: ICD-10-CM

## 2020-08-09 PROCEDURE — 99214 OFFICE O/P EST MOD 30 MIN: CPT | Performed by: NURSE PRACTITIONER

## 2020-08-09 RX ORDER — CHLORHEXIDINE GLUCONATE 4 G/100ML
SOLUTION TOPICAL
Qty: 240 ML | Refills: 0 | Status: SHIPPED
Start: 2020-08-09 | End: 2020-10-29

## 2020-08-09 ASSESSMENT — FIBROSIS 4 INDEX: FIB4 SCORE: 1.92

## 2020-08-11 ASSESSMENT — ENCOUNTER SYMPTOMS
HEADACHES: 0
VOMITING: 0
ORTHOPNEA: 0
COUGH: 0
ABDOMINAL PAIN: 0
WEAKNESS: 0
CHILLS: 0
NAUSEA: 0
MYALGIAS: 0
FEVER: 0
SHORTNESS OF BREATH: 0

## 2020-08-11 NOTE — PROGRESS NOTES
Subjective:   Parmjit Castelan is a 80 y.o. male who presents for Hand Laceration (bilateral hands, from dog leash, redness, swollen x 4 days)       Laceration   The incident occurred 3 to 5 days ago. The laceration is located on the right hand. The laceration is 2 cm in size. The laceration mechanism was a blunt object. The pain is mild. The pain has been constant since onset. He reports no foreign bodies present. His tetanus status is UTD.     Pt presents for evaluation of a new problem, reports sitting with his lab's dog leash wrapped around his hand, when the dog jumped for a squirrel and the leash tore the skin on his right hand.  He washed it, and has been monitoring it, but noticed increased redness and some yellow/white drainage.    Review of Systems   Constitutional: Negative for chills, fever and malaise/fatigue.   Respiratory: Negative for cough and shortness of breath.    Cardiovascular: Negative for chest pain, orthopnea and leg swelling.   Gastrointestinal: Negative for abdominal pain, nausea and vomiting.   Genitourinary: Negative for dysuria.   Musculoskeletal: Negative for myalgias.   Skin:        Skin wound, dorsal hand below thumb   Neurological: Negative for weakness and headaches.   All other systems reviewed and are negative.      MEDS:   Current Outpatient Medications:   •  chlorhexidine (HIBICLENS) 4 % liquid, Wash affected areas twice daily for 7-10 days, Disp: 240 mL, Rfl: 0  •  mupirocin (BACTROBAN) 2 % Ointment, Apply 1 Application to affected area(s) 2 times a day., Disp: 22 g, Rfl: 0  •  amoxicillin-clavulanate (AUGMENTIN) 400-57 MG Chew Tab, Take 1 Tab by mouth every day., Disp: 13 Tab, Rfl: 0  •  B Complex-C-Folic Acid (DIALYVITE PO), Take 1 Tab by mouth every evening., Disp: , Rfl:   •  montelukast (SINGULAIR) 10 MG Tab, Take 10 mg by mouth every day., Disp: , Rfl:   •  omeprazole (PRILOSEC) 40 MG delayed-release capsule, Take 40 mg by mouth every day., Disp: , Rfl:   •  ROPINIRole  "(REQUIP) 0.5 MG Tab, Take 1 mg by mouth every bedtime., Disp: , Rfl:   •  torsemide (DEMADEX) 10 MG tablet, Take 30 mg by mouth every day., Disp: , Rfl:   •  albuterol 108 (90 Base) MCG/ACT Aero Soln inhalation aerosol, Inhale 2 Puffs by mouth every four hours as needed for Shortness of Breath., Disp: 1 Inhaler, Rfl: 0  •  rosuvastatin (CRESTOR) 40 MG tablet, Take 1 Tab by mouth every evening., Disp: 30 Tab, Rfl: 11  •  tamsulosin (FLOMAX) 0.4 MG capsule, Take 0.8 mg by mouth every evening., Disp: , Rfl:   •  fluticasone (FLONASE) 50 MCG/ACT nasal spray, Spray 1-2 Sprays in nose every day. Each nostril., Disp: 1 Bottle, Rfl: 6  •  traZODone (DESYREL) 150 MG Tab, Take 150 mg by mouth See Admin Instructions. Pt takes 150MG @ 2130 and another 150MG @ 0130, Disp: , Rfl:   •  acetaminophen (TYLENOL) 500 MG Tab, Take 500-1,000 mg by mouth every 6 hours as needed for Moderate Pain., Disp: , Rfl:   •  calcium acetate (PHOS-LO) 667 MG Cap, Take 1,334-2,001 mg by mouth See Admin Instructions. Pt reports that he takes 2 cap for each snack and 3 cap 3 times a day with meals, Disp: , Rfl:   •  clopidogrel (PLAVIX) 75 MG Tab, Take 75 mg by mouth every evening., Disp: , Rfl:   ALLERGIES: No Known Allergies    Patient's PMH, SocHx, SurgHx, FamHx, Drug allergies and medications were reviewed.     Objective:   /50 (BP Location: Left arm, Patient Position: Sitting, BP Cuff Size: Adult)   Pulse 82   Temp 36.6 °C (97.8 °F) (Temporal)   Resp 16   Ht 1.715 m (5' 7.5\")   Wt 72.6 kg (160 lb)   SpO2 96%   BMI 24.69 kg/m²     Physical Exam  Vitals signs reviewed.   Constitutional:       General: He is awake.      Appearance: Normal appearance. He is well-developed.   HENT:      Head: Normocephalic and atraumatic.      Right Ear: External ear normal.      Left Ear: External ear normal.      Nose: Nose normal.      Mouth/Throat:      Mouth: Mucous membranes are moist.      Pharynx: Oropharynx is clear.   Eyes:      Extraocular " Movements: Extraocular movements intact.      Conjunctiva/sclera: Conjunctivae normal.   Neck:      Musculoskeletal: Normal range of motion.   Cardiovascular:      Rate and Rhythm: Normal rate and regular rhythm.   Pulmonary:      Effort: Pulmonary effort is normal.   Musculoskeletal: Normal range of motion.   Skin:     General: Skin is warm and dry.      Findings: Laceration (semi-healing) present.          Neurological:      Mental Status: He is alert and oriented to person, place, and time.   Psychiatric:         Mood and Affect: Mood normal.         Behavior: Behavior normal.         Thought Content: Thought content normal.         Judgment: Judgment normal.         Assessment/Plan:   Assessment    1. Laceration of right hand without foreign body, initial encounter  - chlorhexidine (HIBICLENS) 4 % liquid; Wash affected areas twice daily for 7-10 days  Dispense: 240 mL; Refill: 0  - mupirocin (BACTROBAN) 2 % Ointment; Apply 1 Application to affected area(s) 2 times a day.  Dispense: 22 g; Refill: 0  - amoxicillin-clavulanate (AUGMENTIN) 400-57 MG Chew Tab; Take 1 Tab by mouth every day.  Dispense: 13 Tab; Refill: 0    Begin medications as listed.  Reviewed case and antibiotics with pharmacist due to the patient being on dialysis 3x/week.  Supportive care options also discussed.  Differential diagnosis, natural history, and indications for immediate follow-up were discussed.     Return to urgent care clinic or PCP if current symptoms do not improve and/or worsening symptoms occur. Advised of signs and symptoms which would warrant further evaluation and /or emergent evaluation in ER.  All questions answered and the patient agrees to the plan of care.

## 2020-08-24 DIAGNOSIS — H91.90 HEARING LOSS, UNSPECIFIED HEARING LOSS TYPE, UNSPECIFIED LATERALITY: ICD-10-CM

## 2020-08-24 NOTE — PROGRESS NOTES
Patient complaining that he has had acute loss of hearing in the left ear.  He needs a hearing test was [facility] hearing and balance and requesting a referral to Dr. Jada George

## 2020-10-19 DIAGNOSIS — J44.9 CHRONIC OBSTRUCTIVE PULMONARY DISEASE, UNSPECIFIED COPD TYPE (HCC): ICD-10-CM

## 2020-10-19 RX ORDER — FLUTICASONE PROPIONATE 50 MCG
SPRAY, SUSPENSION (ML) NASAL
Qty: 9.9 ML | Refills: 0 | Status: SHIPPED | OUTPATIENT
Start: 2020-10-19 | End: 2020-10-29 | Stop reason: SDUPTHER

## 2020-10-19 NOTE — TELEPHONE ENCOUNTER
Have we ever prescribed this med? Yes.  If yes, what date?     Last OV: 3/17/2020 - DR. VARGAS    Next OV: was due back 4/2020    DX: COPD    Medications: Flonase

## 2020-10-29 ENCOUNTER — OFFICE VISIT (OUTPATIENT)
Dept: MEDICAL GROUP | Facility: MEDICAL CENTER | Age: 80
End: 2020-10-29
Payer: MEDICARE

## 2020-10-29 VITALS
RESPIRATION RATE: 18 BRPM | SYSTOLIC BLOOD PRESSURE: 122 MMHG | TEMPERATURE: 98 F | DIASTOLIC BLOOD PRESSURE: 84 MMHG | BODY MASS INDEX: 24.89 KG/M2 | HEART RATE: 70 BPM | OXYGEN SATURATION: 91 % | WEIGHT: 164.2 LBS | HEIGHT: 68 IN

## 2020-10-29 DIAGNOSIS — J44.9 CHRONIC OBSTRUCTIVE PULMONARY DISEASE, UNSPECIFIED COPD TYPE (HCC): ICD-10-CM

## 2020-10-29 DIAGNOSIS — N18.6 ESRD (END STAGE RENAL DISEASE) ON DIALYSIS (HCC): ICD-10-CM

## 2020-10-29 DIAGNOSIS — G25.81 RESTLESS LEG SYNDROME: ICD-10-CM

## 2020-10-29 DIAGNOSIS — Z99.2 ANEMIA IN CHRONIC KIDNEY DISEASE, ON CHRONIC DIALYSIS (HCC): ICD-10-CM

## 2020-10-29 DIAGNOSIS — Z99.2 ESRD (END STAGE RENAL DISEASE) ON DIALYSIS (HCC): ICD-10-CM

## 2020-10-29 DIAGNOSIS — D63.1 ANEMIA IN CHRONIC KIDNEY DISEASE, ON CHRONIC DIALYSIS (HCC): ICD-10-CM

## 2020-10-29 DIAGNOSIS — N25.81 SECONDARY RENAL HYPERPARATHYROIDISM (HCC): ICD-10-CM

## 2020-10-29 DIAGNOSIS — I73.9 PAD (PERIPHERAL ARTERY DISEASE) (HCC): ICD-10-CM

## 2020-10-29 DIAGNOSIS — I35.0 SEVERE AORTIC STENOSIS: ICD-10-CM

## 2020-10-29 DIAGNOSIS — N18.6 ANEMIA IN CHRONIC KIDNEY DISEASE, ON CHRONIC DIALYSIS (HCC): ICD-10-CM

## 2020-10-29 PROBLEM — K25.3 ACUTE GASTRIC ULCER: Status: RESOLVED | Noted: 2018-10-19 | Resolved: 2020-10-29

## 2020-10-29 PROBLEM — J96.21 ACUTE ON CHRONIC RESPIRATORY FAILURE WITH HYPOXEMIA (HCC): Status: RESOLVED | Noted: 2020-03-05 | Resolved: 2020-10-29

## 2020-10-29 PROBLEM — E87.5 HYPERKALEMIA: Status: RESOLVED | Noted: 2020-02-06 | Resolved: 2020-10-29

## 2020-10-29 PROBLEM — R05.3 CHRONIC COUGH: Status: RESOLVED | Noted: 2019-11-14 | Resolved: 2020-10-29

## 2020-10-29 PROBLEM — S40.021A CONTUSION OF RIGHT UPPER EXTREMITY: Status: RESOLVED | Noted: 2020-02-06 | Resolved: 2020-10-29

## 2020-10-29 PROBLEM — E87.20 LACTIC ACIDOSIS: Status: RESOLVED | Noted: 2019-09-13 | Resolved: 2020-10-29

## 2020-10-29 PROBLEM — J94.8 TRANSUDATIVE PLEURAL EFFUSION: Status: RESOLVED | Noted: 2019-10-24 | Resolved: 2020-10-29

## 2020-10-29 PROBLEM — J90 PLEURAL EFFUSION: Status: RESOLVED | Noted: 2020-03-05 | Resolved: 2020-10-29

## 2020-10-29 PROBLEM — D61.818 PANCYTOPENIA (HCC): Status: RESOLVED | Noted: 2019-09-13 | Resolved: 2020-10-29

## 2020-10-29 PROBLEM — R60.0 PEDAL EDEMA: Status: RESOLVED | Noted: 2017-03-21 | Resolved: 2020-10-29

## 2020-10-29 PROBLEM — J40 BRONCHITIS: Status: RESOLVED | Noted: 2019-12-10 | Resolved: 2020-10-29

## 2020-10-29 PROBLEM — Z09 HOSPITAL DISCHARGE FOLLOW-UP: Status: RESOLVED | Noted: 2019-09-17 | Resolved: 2020-10-29

## 2020-10-29 PROBLEM — N19 UREMIA: Status: RESOLVED | Noted: 2018-03-20 | Resolved: 2020-10-29

## 2020-10-29 PROBLEM — Z79.01 CHRONIC ANTICOAGULATION: Chronic | Status: RESOLVED | Noted: 2019-06-12 | Resolved: 2020-10-29

## 2020-10-29 PROBLEM — R74.01 ELEVATED TRANSAMINASE LEVEL: Status: RESOLVED | Noted: 2019-08-03 | Resolved: 2020-10-29

## 2020-10-29 PROCEDURE — 99214 OFFICE O/P EST MOD 30 MIN: CPT | Performed by: FAMILY MEDICINE

## 2020-10-29 RX ORDER — OMEPRAZOLE 40 MG/1
40 CAPSULE, DELAYED RELEASE ORAL DAILY
Qty: 90 CAP | Refills: 3 | Status: SHIPPED | OUTPATIENT
Start: 2020-10-29

## 2020-10-29 RX ORDER — MONTELUKAST SODIUM 10 MG/1
10 TABLET ORAL DAILY
Qty: 90 TAB | Refills: 3 | Status: SHIPPED | OUTPATIENT
Start: 2020-10-29

## 2020-10-29 RX ORDER — ALBUTEROL SULFATE 90 UG/1
2 AEROSOL, METERED RESPIRATORY (INHALATION) EVERY 4 HOURS PRN
Qty: 1 EACH | Refills: 0 | Status: SHIPPED | OUTPATIENT
Start: 2020-10-29

## 2020-10-29 RX ORDER — TRAZODONE HYDROCHLORIDE 150 MG/1
150 TABLET ORAL SEE ADMIN INSTRUCTIONS
Qty: 30 TAB | Refills: 2 | Status: SHIPPED | OUTPATIENT
Start: 2020-10-29

## 2020-10-29 RX ORDER — ROSUVASTATIN CALCIUM 40 MG/1
40 TABLET, COATED ORAL EVERY EVENING
Qty: 90 TAB | Refills: 3 | Status: SHIPPED | OUTPATIENT
Start: 2020-10-29

## 2020-10-29 RX ORDER — ROPINIROLE 0.5 MG/1
1 TABLET, FILM COATED ORAL
Qty: 90 TAB | Refills: 1 | Status: SHIPPED | OUTPATIENT
Start: 2020-10-29

## 2020-10-29 RX ORDER — FLUTICASONE PROPIONATE 50 MCG
SPRAY, SUSPENSION (ML) NASAL
Qty: 9.9 ML | Refills: 0 | Status: SHIPPED | OUTPATIENT
Start: 2020-10-29

## 2020-10-29 RX ORDER — TAMSULOSIN HYDROCHLORIDE 0.4 MG/1
0.8 CAPSULE ORAL EVERY EVENING
Qty: 90 CAP | Refills: 2 | Status: SHIPPED | OUTPATIENT
Start: 2020-10-29

## 2020-10-29 RX ORDER — CLOPIDOGREL BISULFATE 75 MG/1
75 TABLET ORAL DAILY
COMMUNITY
End: 2020-11-16

## 2020-10-29 RX ORDER — TORSEMIDE 10 MG/1
30 TABLET ORAL DAILY
Qty: 30 TAB | Refills: 2 | Status: SHIPPED | OUTPATIENT
Start: 2020-10-29

## 2020-10-29 ASSESSMENT — ENCOUNTER SYMPTOMS
CONSTIPATION: 0
SHORTNESS OF BREATH: 0
FEVER: 0
NAUSEA: 0
DEPRESSION: 0
PALPITATIONS: 0
WEAKNESS: 1
DIARRHEA: 0
VOMITING: 0
CHILLS: 0
BLURRED VISION: 0

## 2020-10-29 ASSESSMENT — FIBROSIS 4 INDEX: FIB4 SCORE: 1.92

## 2020-10-29 NOTE — PROGRESS NOTES
History of Present Illness  80 year old male presents to clinic to establish care and for annual physical.  He does have end-stage renal disease for which he receives dialysis Monday, Wednesday, and Friday.  He feels this is going generally well.  He also has anemia related to his CKD, which is being managed by nephrology.  His most recent hemoglobin on file is trending upwards.  The patient does note being very fatigued and weak though.    He also has COPD, and is followed by pulmonology.  He uses his albuterol intermittently.  He quit using tobacco products around 2009.    He was recently diagnosed with severe aortic stenosis.  He does follow with cardiology, and has plans to get a TAVR, however has been holding off due to coronavirus.  He is currently off of his Plavix as he is getting some dental work completed in the near future.    He also has restless leg syndrome, for which he takes Requip and insomnia for which he takes trazodone.  This is going all right, however he still notes some chronic right foot pain, that is secondary to peripheral arterial disease.  He does get an average of 4 to 6 hours of sleep per night.  He usually feels rested with this.  He does not nap during the daytime.    He denies any other questions or concerns at this time.    ROS  Review of Systems   Constitutional: Negative for chills and fever.   HENT: Positive for hearing loss (stable).    Eyes: Negative for blurred vision.   Respiratory: Negative for shortness of breath.    Cardiovascular: Negative for chest pain and palpitations.   Gastrointestinal: Negative for constipation, diarrhea, nausea and vomiting.   Genitourinary: Negative for dysuria and hematuria.   Skin: Negative for rash.   Neurological: Positive for weakness (generalized).   Psychiatric/Behavioral: Negative for depression.     Medications  Current Outpatient Medications   Medication Sig Dispense Refill   • clopidogrel (PLAVIX) 75 MG Tab Take 75 mg by mouth every  day.     • traZODone (DESYREL) 150 MG Tab Take 1 Tab by mouth See Admin Instructions. Pt takes 150MG @ 2130 and another 150MG @ 0130 30 Tab 2   • torsemide (DEMADEX) 10 MG tablet Take 3 Tabs by mouth every day. 30 Tab 2   • tamsulosin (FLOMAX) 0.4 MG capsule Take 2 Caps by mouth every evening. 90 Cap 2   • rosuvastatin (CRESTOR) 40 MG tablet Take 1 Tab by mouth every evening. 90 Tab 3   • ROPINIRole (REQUIP) 0.5 MG Tab Take 2 Tabs by mouth every bedtime. 90 Tab 1   • montelukast (SINGULAIR) 10 MG Tab Take 1 Tab by mouth every day. 90 Tab 3   • fluticasone (FLONASE) 50 MCG/ACT nasal spray SPRAY ONE TO TWO SPRAYS IN EACH NOSTRIL ONCE DAILY *FLONASE* 9.9 mL 0   • albuterol 108 (90 Base) MCG/ACT Aero Soln inhalation aerosol Inhale 2 Puffs by mouth every four hours as needed for Shortness of Breath. 1 Each 0   • omeprazole (PRILOSEC) 40 MG delayed-release capsule Take 1 Cap by mouth every day. 90 Cap 3   • B Complex-C-Folic Acid (DIALYVITE PO) Take 1 Tab by mouth every evening.     • calcium acetate (PHOS-LO) 667 MG Cap Take 1,334-2,001 mg by mouth See Admin Instructions. Pt reports that he takes 2 cap for each snack and 3 cap 3 times a day with meals       No current facility-administered medications for this visit.      Allergies  No Known Allergies     Problem List  Patient Active Problem List   Diagnosis   • BPH (benign prostatic hypertrophy)   • Hard of hearing   • HELGA (obstructive sleep apnea)   • COPD (chronic obstructive pulmonary disease) ([facility])   • Secondary renal hyperparathyroidism ([facility])   • 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[facility])   • 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[facility])   • Coronary artery disease   • Anemia in CKD (chronic kidney disease)   • PAD (peripheral artery disease) ([facility])   • Primary osteoarthritis of both hands   • Uric acid arthropathy   • ESRD (end stage renal disease) on dialysis ([facility])   • Renal cyst   • AVM (arteriovenous malformation)   • Severe aortic stenosis   • Gastric polyps   • Esophagitis   • Primary insomnia  "  • History of basal cell carcinoma (BCC)   • Ischemic necrosis of foot (HCC)   • Restless leg syndrome     Past Medical History  Past Medical History:   Diagnosis Date   • Anesthesia     \"needs more sedation\" (can sometimes hear MD during procedure)    • Anticoagulation monitoring, special range    • Aortic stenosis     mod AS- concern for low flow severe AS with rEFof 30%   • Arterial leg ulcer ([facility]) 11/8/2018   • Atrial flutter ([facility]) 6/12/2019   • Basal cell carcinoma of left cheek 3/26/2015   • BPH 7/14/2009   • Bronchitis 12/25/2018   • CAD (coronary artery disease) 2/20/2014   • CATARACT     sanjuanita surgery complete   • CHF (congestive heart failure), NYHA class II, chronic, systolic ([facility]) E F30 in setting of atrial flutter 6/13/2019   • Chronic respiratory failure with hypoxia ([facility]) 5/8/2016   • CKD (chronic kidney disease) stage 4, GFR 15-29 ml/min ([facility]) 1/15/2010    end stage renal disease   • COPD (chronic obstructive pulmonary disease) ([facility])     wears 2.5 L o2 sometimes when he sleeps   • Detached retina    • Dialysis     m,w,f [facility]   • EMPHYSEMA    • Glaucoma     Early stage   • Gout    • Heart burn     occas   • Heart murmur     maria esther lee cardiologist   • Hypertension    • Indigestion     occas   • Kidney cyst    • Leg pain, bilateral 8/10/2015   • On supplemental oxygen therapy    • Peripheral vascular disease ([facility]) 8/10/2015   • Pneumonia    • Primary insomnia 3/22/2018   • Proteinuria    • PVD (peripheral vascular disease) ([facility])    • Sleep apnea     O2 PER CANULA  AT NIGHT 2l/m     Past Surgical History  Past Surgical History:   Procedure Laterality Date   • PB COLONOSCOPY,FLEX,W/CONTROL, BLEEDING N/A 2/24/2020    Procedure: COLONOSCOPY, WITH ARGON PLASMA COAGULATION;  Surgeon: Juan Matthews M.D.;  Location: SURGERY SAME DAY [facility];  Service: Gastroenterology   • GASTROSCOPY W/PUSH ENTERSCOPY N/A 2/24/2020    Procedure: GASTROSCOPY, WITH PUSH ENTEROSCOPY;  Surgeon: Juan CHRISTIANSON" ROLDAN Matthews;  Location: SURGERY SAME DAY [facility];  Service: Gastroenterology   • PB COLONOSCOPY,DIAGNOSTIC N/A 2/23/2020    Procedure: COLONOSCOPY;  Surgeon: Enrique Child D.O.;  Location: SURGERY [facility];  Service: Gastroenterology   • GASTROSCOPY N/A 2/23/2020    Procedure: GASTROSCOPY;  Surgeon: Enrique Child D.O.;  Location: SURGERY [facility];  Service: Gastroenterology   • CAPSULE ENDOSCOPY ADMINISTRATION N/A 2/20/2020    Procedure: ADMINISTRATION, CAPSULE, FOR CAPSULE ENDOSCOPY;  Surgeon: Gucci Westbrook M.D.;  Location: ENDOSCOPY [facility];  Service: Gastroenterology   • CAPSULE ENDOSCOPY RETRIEVAL  2/20/2020    Procedure: CAPSULE ENDOSCOPY RETRIEVAL;  Surgeon: Gucci Westbrook M.D.;  Location: ENDOSCOPY [facility];  Service: Gastroenterology   • PB SMALL BOWEL ENDOSCOPY,PAST 2ND DUOD  2/19/2020        • COLONOSCOPY - ENDO  2/19/2020        • PB COLONOSCOPY,DIAGNOSTIC  2/19/2020    Procedure: COLONOSCOPY;  Surgeon: Gucci Westbrook M.D.;  Location: [facility];  Service: Gastroenterology   • GASTROSCOPY  2/19/2020    Procedure: GASTROSCOPY;  Surgeon: Gucci Westbrook M.D.;  Location: [facility];  Service: Gastroenterology   • GASTROSCOPY-ENDO  2/7/2020    Procedure: GASTROSCOPY;  Surgeon: Jong Carrasquillo M.D.;  Location: ENDOSCOPY [facility];  Service: Gastroenterology   • STENT PLACEMENT  01/26/2020    lda   • AV FISTULA CREATION Right 8/6/2019    Procedure: CREATION, AV FISTULA -  RIGHT ARM  ,  and Ligation of Left Arm Fistula;  Surgeon: Sera Peters M.D.;  Location: SURGERY [facility];  Service: General   • CATH PLACEMENT Right 8/6/2019    Procedure: INSERTION, CATHETER - PERMA ,;  Surgeon: Sera Peters M.D.;  Location: SURGERY [facility];  Service: General   • JUSTIN  6/13/2019    Procedure: ECHOCARDIOGRAM, TRANSESOPHAGEAL;  Surgeon: Harvinder Hoang M.D.;  Location: [facility];  Service:  Cardiac   • CARDIOVERSION  6/13/2019    Procedure: CARDIOVERSION;  Surgeon: Harvinder Hoang M.D.;  Location: SURGERY [facility];  Service: Cardiac   • AV FISTULA CREATION Right 2/21/2019    Procedure: AV FISTULA CREATION - ARM;  Surgeon: David Cason M.D.;  Location: SURGERY [facility];  Service: General   • FEMORAL ENDARTERECTOMY Left 1/25/2019    Procedure: FEMORAL ENDARTERECTOMY;  Surgeon: David Cason M.D.;  Location: SURGERY [facility];  Service: General   • FEMORAL POPLITEAL BYPASS Left 1/25/2019    Procedure: LEFT FEMORAL POPLITEAL POLYTETRAFLUOROETHYLENE ePTFE(PROPATEN VASCULAR GRAFT) BYPASS;  Surgeon: David Cason M.D.;  Location: SURGERY [facility];  Service: General   • ANGIOGRAM Left 1/25/2019    Procedure: LEFT LEG ANGIOGRAM;  Surgeon: David Cason M.D.;  Location: SURGERY [facility];  Service: General   • ENDOSCOPY PROCEDURE  3/21/2018    Procedure: ENDOSCOPY PROCEDURE/UPPER;  Surgeon: Enrique Child D.O.;  Location: SURGERY [facility];  Service: Gastroenterology   • COLONOSCOPY - ENDO  8/15/2016    Procedure: COLONOSCOPY - ENDO;  Surgeon: Mane Whatley M.D.;  Location: [facility];  Service:    • GASTROSCOPY WITH BIOPSY  8/13/2016    Procedure: GASTROSCOPY WITH BIOPSY;  Surgeon: Jorge Leavitt M.D.;  Location: [facility];  Service:    • RECOVERY  12/23/2015    Procedure: VASCULAR CASE-CASON-LEFT ARM FISTULOGRAM WITH ANGIOPLASTY;  Surgeon: Recoveryonbhavani Surgery;  [place] SURG[place] [facility];  Service:    • RECOVERY  3/24/2015    Performed by [facility] at SURGERY PRE[facility]POST PROC [facility]   • RECOVERY  7/29/2014    Performed by [facility] at SURGERY SAME DAY [facility]   • RECOVERY  3/24/2014    Performed by [facility] at SURGERY [facility]   • RECOVERY  12/17/2013    Performed by [facility] at SURGERY SAME DAY [facility]   • RECOVERY  7/2/2013    Performed  by [facility] at SURGERY SAME DAY [facility]   • AV FISTULA THROMBOLYSIS  7/2/2013    Performed by David Cason M.D. at SURGERY [facility]   • RECOVERY  1/29/2013    Performed by [facility] at SURGERY SAME DAY [facility]   • RECOVERY  7/23/2012    Performed by SURGERY, IR-RECOVERY at SURGERY SAME DAY [facility]   • VITRECTOMY POSTERIOR  10/11/2011    Performed by NAHUM GE at SURGERY SAME DAY [facility]   • RECOVERY  8/12/2011    Performed by SURGERY, IR-RECOVERY at SURGERY SAME DAY [facility]   • VITRECTOMY POSTERIOR  1/18/2011    Performed by NAHUM GE at SURGERY SAME DAY [facility]   • SCLERAL BUCKLING  1/18/2011    Performed by NAHUM GE at SURGERY SAME DAY [facility]   • AV FISTULOGRAM  9/17/2010    Performed by DAVID CASON at SURGERY [facility]   • ANGIOPLASTY BALLOON  9/17/2010    Performed by DAVID CASON at SURGERY [facility]   • AV FISTULOGRAM  7/23/2010    Performed by DAVID CASON at SURGERY [facility]   • ANGIOPLASTY BALLOON  7/23/2010    Performed by DAVID CASON at SURGERY [facility]   • AV FISTULA REVISION  2/19/2010    Performed by WILLIE HATCH at SURGERY [facility]   • AV FISTULA CREATION  2/12/2010    Performed by DAVID CASON at SURGERY [facility]   • CATARACT PHACO WITH IOL  4/8/08    Performed by STACEY PHILLIPS at SURGERY SAME DAY [facility]   • OTHER ORTHOPEDIC SURGERY  1998    right toe for facititis     Past Family History  Family History   Problem Relation Age of Onset   • Stroke Mother    • Hypertension Mother    • Lung Disease Father         Emphysema, resp failure   • Hypertension Brother    • Heart Disease Brother    • Hypertension Son    • No Known Problems Son      Social History  He reports eating a healthy and balanced diet, he does not get regular exercise, states he is often too fatigued for this. 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 He drinks an average of 5 alcoholic beverages per month. He denies any tobacco product or illicit drug use. He is sexually active with one, female partner and they do not use any form of contraception.    Physical Exam  /84 (BP Location: Left arm, Patient Position: Sitting, BP Cuff Size: Adult)   Pulse 70   Temp 36.7 °C (98 °F) (Temporal)   Resp 18   Ht 1.727 m (5' 8\")   Wt 74.5 kg (164 lb 3.2 oz)   SpO2 91%   BMI 24.97 kg/m²   Physical Exam   Constitutional: He is well-developed, well-nourished, and in no distress. No distress.   He is using a cane for ambulation.   HENT:   Head: Normocephalic and atraumatic.   Right Ear: Tympanic membrane, external ear and ear canal normal.   Left Ear: Tympanic membrane, external ear and ear canal normal.   Eyes: Pupils are equal, round, and reactive to light. Right eye exhibits no discharge. Left eye exhibits no discharge. No scleral icterus.   Neck: No thyromegaly present.   Cardiovascular: Normal rate and regular rhythm.   Murmur (systolic) heard.  Pulmonary/Chest: Effort normal and breath sounds normal. No respiratory distress.   Abdominal: Soft. Bowel sounds are normal. He exhibits no distension. There is no abdominal tenderness.   Musculoskeletal:         General: No edema.   Neurological: He is alert.   Skin: Skin is warm and dry. He is not diaphoretic.   Psychiatric: Affect and judgment normal.     Assessment & Plan  1. ESRD (end stage renal disease) on dialysis ([facility])  2. Anemia in chronic kidney disease, on chronic dialysis ([facility])  3. Secondary renal hyperparathyroidism (HCC)  He continues with dialysis Monday, Wednesday, Friday.  Nephrology is following him closely.  His labs are done monthly through them.    4. Chronic obstructive pulmonary disease, unspecified COPD type ([facility])  He continues to follow with pulmonology regularly.  Medications have been refilled today  - fluticasone (FLONASE) 50 MCG/ACT nasal spray; " SPRAY ONE TO TWO SPRAYS IN EACH NOSTRIL ONCE DAILY *FLONASE*  Dispense: 9.9 mL; Refill: 0    5. Severe aortic stenosis  Cardiology note from 3/17/2020 was reviewed, it was recommended that he continue with a statin and Plavix.  I did discuss this with the patient.  He will continue to hold Plavix until his dental work is completed and then resume it.    6. Restless leg syndrome  7. PAD (peripheral artery disease) (HCC)  Requip and trazodone were refilled today.  Obtained and reviewed patient utilization report from [facility] pharmacy database on 10/29/2020 11:48 AM  prior to writing prescription for controlled substance II, III or IV per [place] bill . Based on assessment of the report, my physical exam, and the patient's health problem, the prescription is medically necessary.       Return in about 6 months (around 4/29/2021) for medicare annual wellness visit .    Maryse Hartman M.D.

## 2020-11-13 ENCOUNTER — NURSE TRIAGE (OUTPATIENT)
Dept: HEALTH INFORMATION MANAGEMENT | Facility: OTHER | Age: 80
End: 2020-11-13

## 2020-11-13 NOTE — TELEPHONE ENCOUNTER
Regarding: wants to kristina appt with his pcp   ----- Message from Bernie Upton sent at 11/13/2020  2:44 PM PST -----  Bleeding internally- refused er

## 2020-11-13 NOTE — TELEPHONE ENCOUNTER
Bleeding internally, hemoglobin dropped to  8.1, needs colonoscopy per nephrologist (saw last week & spoke to today), wants to see PCP, needs referral for colonscoy.      No covid symptoms & no known exposure. Dialysis 3 days a week.  There now.   Cleared for inpatient visit.      Transferred called to scheduling to make appts.

## 2020-11-16 ENCOUNTER — APPOINTMENT (OUTPATIENT)
Dept: RADIOLOGY | Facility: MEDICAL CENTER | Age: 80
DRG: 377 | End: 2020-11-16
Attending: EMERGENCY MEDICINE
Payer: MEDICARE

## 2020-11-16 ENCOUNTER — HOSPITAL ENCOUNTER (INPATIENT)
Facility: MEDICAL CENTER | Age: 80
LOS: 1 days | DRG: 377 | End: 2020-11-17
Attending: EMERGENCY MEDICINE | Admitting: HOSPITALIST
Payer: MEDICARE

## 2020-11-16 DIAGNOSIS — N18.6 ESRD (END STAGE RENAL DISEASE) (HCC): ICD-10-CM

## 2020-11-16 DIAGNOSIS — K92.2 UPPER GI BLEED: ICD-10-CM

## 2020-11-16 DIAGNOSIS — D64.9 SYMPTOMATIC ANEMIA: ICD-10-CM

## 2020-11-16 LAB
ABO GROUP BLD: NORMAL
ALBUMIN SERPL BCP-MCNC: 3.5 G/DL (ref 3.2–4.9)
ALBUMIN/GLOB SERPL: 1.1 G/DL
ALP SERPL-CCNC: 134 U/L (ref 30–99)
ALT SERPL-CCNC: 13 U/L (ref 2–50)
ANION GAP SERPL CALC-SCNC: 22 MMOL/L (ref 7–16)
AST SERPL-CCNC: 26 U/L (ref 12–45)
BARCODED ABORH UBTYP: 5100
BARCODED PRD CODE UBPRD: NORMAL
BARCODED UNIT NUM UBUNT: NORMAL
BASOPHILS # BLD AUTO: 0.4 % (ref 0–1.8)
BASOPHILS # BLD: 0.02 K/UL (ref 0–0.12)
BILIRUB SERPL-MCNC: 0.3 MG/DL (ref 0.1–1.5)
BLD GP AB SCN SERPL QL: NORMAL
BUN SERPL-MCNC: 78 MG/DL (ref 8–22)
CALCIUM SERPL-MCNC: 10.2 MG/DL (ref 8.4–10.2)
CHLORIDE SERPL-SCNC: 92 MMOL/L (ref 96–112)
CO2 SERPL-SCNC: 22 MMOL/L (ref 20–33)
COMPONENT R 8504R: NORMAL
COVID ORDER STATUS COVID19: NORMAL
CREAT SERPL-MCNC: 9.83 MG/DL (ref 0.5–1.4)
EKG IMPRESSION: NORMAL
EOSINOPHIL # BLD AUTO: 0.18 K/UL (ref 0–0.51)
EOSINOPHIL NFR BLD: 3.6 % (ref 0–6.9)
ERYTHROCYTE [DISTWIDTH] IN BLOOD BY AUTOMATED COUNT: 57.5 FL (ref 35.9–50)
FERRITIN SERPL-MCNC: 331 NG/ML (ref 22–322)
FOLATE SERPL-MCNC: >40 NG/ML
GLOBULIN SER CALC-MCNC: 3.1 G/DL (ref 1.9–3.5)
GLUCOSE SERPL-MCNC: 114 MG/DL (ref 65–99)
HAV IGM SERPL QL IA: NORMAL
HBV CORE AB SERPL QL IA: NONREACTIVE
HBV CORE IGM SER QL: NORMAL
HBV SURFACE AG SER QL: NORMAL
HCT VFR BLD AUTO: 22.2 % (ref 42–52)
HCV AB SER QL: NORMAL
HGB BLD-MCNC: 6.7 G/DL (ref 14–18)
HGB BLD-MCNC: 7.9 G/DL (ref 14–18)
IMM GRANULOCYTES # BLD AUTO: 0.01 K/UL (ref 0–0.11)
IMM GRANULOCYTES NFR BLD AUTO: 0.2 % (ref 0–0.9)
INR PPP: 1.03 (ref 0.87–1.13)
IRON SATN MFR SERPL: 22 % (ref 15–55)
IRON SERPL-MCNC: 57 UG/DL (ref 50–180)
LYMPHOCYTES # BLD AUTO: 0.59 K/UL (ref 1–4.8)
LYMPHOCYTES NFR BLD: 11.7 % (ref 22–41)
MCH RBC QN AUTO: 31.2 PG (ref 27–33)
MCHC RBC AUTO-ENTMCNC: 30.2 G/DL (ref 33.7–35.3)
MCV RBC AUTO: 103.3 FL (ref 81.4–97.8)
MONOCYTES # BLD AUTO: 0.33 K/UL (ref 0–0.85)
MONOCYTES NFR BLD AUTO: 6.5 % (ref 0–13.4)
NEUTROPHILS # BLD AUTO: 3.92 K/UL (ref 1.82–7.42)
NEUTROPHILS NFR BLD: 77.6 % (ref 44–72)
NRBC # BLD AUTO: 0 K/UL
NRBC BLD-RTO: 0 /100 WBC
PLATELET # BLD AUTO: 205 K/UL (ref 164–446)
PMV BLD AUTO: 10.2 FL (ref 9–12.9)
POTASSIUM SERPL-SCNC: 4.8 MMOL/L (ref 3.6–5.5)
PRODUCT TYPE UPROD: NORMAL
PROT SERPL-MCNC: 6.6 G/DL (ref 6–8.2)
PROTHROMBIN TIME: 13.2 SEC (ref 12–14.6)
RBC # BLD AUTO: 2.15 M/UL (ref 4.7–6.1)
RH BLD: NORMAL
SODIUM SERPL-SCNC: 136 MMOL/L (ref 135–145)
TIBC SERPL-MCNC: 262 UG/DL (ref 250–450)
TROPONIN T SERPL-MCNC: 226 NG/L (ref 6–19)
UIBC SERPL-MCNC: 205 UG/DL (ref 110–370)
UNIT STATUS USTAT: NORMAL
VIT B12 SERPL-MCNC: 3202 PG/ML (ref 211–911)
WBC # BLD AUTO: 5.1 K/UL (ref 4.8–10.8)

## 2020-11-16 PROCEDURE — 96366 THER/PROPH/DIAG IV INF ADDON: CPT

## 2020-11-16 PROCEDURE — 90935 HEMODIALYSIS ONE EVALUATION: CPT

## 2020-11-16 PROCEDURE — 80074 ACUTE HEPATITIS PANEL: CPT

## 2020-11-16 PROCEDURE — P9016 RBC LEUKOCYTES REDUCED: HCPCS

## 2020-11-16 PROCEDURE — 82607 VITAMIN B-12: CPT

## 2020-11-16 PROCEDURE — 700111 HCHG RX REV CODE 636 W/ 250 OVERRIDE (IP): Performed by: EMERGENCY MEDICINE

## 2020-11-16 PROCEDURE — 86850 RBC ANTIBODY SCREEN: CPT

## 2020-11-16 PROCEDURE — U0003 INFECTIOUS AGENT DETECTION BY NUCLEIC ACID (DNA OR RNA); SEVERE ACUTE RESPIRATORY SYNDROME CORONAVIRUS 2 (SARS-COV-2) (CORONAVIRUS DISEASE [COVID-19]), AMPLIFIED PROBE TECHNIQUE, MAKING USE OF HIGH THROUGHPUT TECHNOLOGIES AS DESCRIBED BY CMS-2020-01-R: HCPCS

## 2020-11-16 PROCEDURE — 36430 TRANSFUSION BLD/BLD COMPNT: CPT

## 2020-11-16 PROCEDURE — 700102 HCHG RX REV CODE 250 W/ 637 OVERRIDE(OP): Performed by: HOSPITALIST

## 2020-11-16 PROCEDURE — 770020 HCHG ROOM/CARE - TELE (206)

## 2020-11-16 PROCEDURE — 71045 X-RAY EXAM CHEST 1 VIEW: CPT

## 2020-11-16 PROCEDURE — 700105 HCHG RX REV CODE 258: Performed by: EMERGENCY MEDICINE

## 2020-11-16 PROCEDURE — 86901 BLOOD TYPING SEROLOGIC RH(D): CPT

## 2020-11-16 PROCEDURE — 99285 EMERGENCY DEPT VISIT HI MDM: CPT

## 2020-11-16 PROCEDURE — 93005 ELECTROCARDIOGRAM TRACING: CPT | Performed by: EMERGENCY MEDICINE

## 2020-11-16 PROCEDURE — 86704 HEP B CORE ANTIBODY TOTAL: CPT

## 2020-11-16 PROCEDURE — 82728 ASSAY OF FERRITIN: CPT

## 2020-11-16 PROCEDURE — 36415 COLL VENOUS BLD VENIPUNCTURE: CPT

## 2020-11-16 PROCEDURE — C9113 INJ PANTOPRAZOLE SODIUM, VIA: HCPCS | Performed by: EMERGENCY MEDICINE

## 2020-11-16 PROCEDURE — C9113 INJ PANTOPRAZOLE SODIUM, VIA: HCPCS | Performed by: HOSPITALIST

## 2020-11-16 PROCEDURE — 700105 HCHG RX REV CODE 258: Performed by: HOSPITALIST

## 2020-11-16 PROCEDURE — 96365 THER/PROPH/DIAG IV INF INIT: CPT

## 2020-11-16 PROCEDURE — 85610 PROTHROMBIN TIME: CPT

## 2020-11-16 PROCEDURE — A9270 NON-COVERED ITEM OR SERVICE: HCPCS | Performed by: HOSPITALIST

## 2020-11-16 PROCEDURE — 86923 COMPATIBILITY TEST ELECTRIC: CPT

## 2020-11-16 PROCEDURE — 82746 ASSAY OF FOLIC ACID SERUM: CPT

## 2020-11-16 PROCEDURE — 84484 ASSAY OF TROPONIN QUANT: CPT

## 2020-11-16 PROCEDURE — 99223 1ST HOSP IP/OBS HIGH 75: CPT | Mod: AI | Performed by: HOSPITALIST

## 2020-11-16 PROCEDURE — 83540 ASSAY OF IRON: CPT

## 2020-11-16 PROCEDURE — 700111 HCHG RX REV CODE 636 W/ 250 OVERRIDE (IP): Performed by: HOSPITALIST

## 2020-11-16 PROCEDURE — 700101 HCHG RX REV CODE 250: Performed by: INTERNAL MEDICINE

## 2020-11-16 PROCEDURE — 85018 HEMOGLOBIN: CPT | Mod: XU

## 2020-11-16 PROCEDURE — 80053 COMPREHEN METABOLIC PANEL: CPT

## 2020-11-16 PROCEDURE — 96375 TX/PRO/DX INJ NEW DRUG ADDON: CPT

## 2020-11-16 PROCEDURE — 86900 BLOOD TYPING SEROLOGIC ABO: CPT

## 2020-11-16 PROCEDURE — 30233N1 TRANSFUSION OF NONAUTOLOGOUS RED BLOOD CELLS INTO PERIPHERAL VEIN, PERCUTANEOUS APPROACH: ICD-10-PCS | Performed by: STUDENT IN AN ORGANIZED HEALTH CARE EDUCATION/TRAINING PROGRAM

## 2020-11-16 PROCEDURE — 83550 IRON BINDING TEST: CPT

## 2020-11-16 PROCEDURE — 85025 COMPLETE CBC W/AUTO DIFF WBC: CPT

## 2020-11-16 RX ORDER — ROSUVASTATIN CALCIUM 10 MG/1
10 TABLET, COATED ORAL EVERY EVENING
Status: DISCONTINUED | OUTPATIENT
Start: 2020-11-16 | End: 2020-11-17 | Stop reason: HOSPADM

## 2020-11-16 RX ORDER — TORSEMIDE 5 MG/1
30 TABLET ORAL DAILY
Status: DISCONTINUED | OUTPATIENT
Start: 2020-11-16 | End: 2020-11-17 | Stop reason: HOSPADM

## 2020-11-16 RX ORDER — MONTELUKAST SODIUM 10 MG/1
10 TABLET ORAL EVERY EVENING
Status: DISCONTINUED | OUTPATIENT
Start: 2020-11-16 | End: 2020-11-17 | Stop reason: HOSPADM

## 2020-11-16 RX ORDER — ALBUTEROL SULFATE 90 UG/1
2 AEROSOL, METERED RESPIRATORY (INHALATION) EVERY 4 HOURS PRN
Status: DISCONTINUED | OUTPATIENT
Start: 2020-11-16 | End: 2020-11-17 | Stop reason: HOSPADM

## 2020-11-16 RX ORDER — ONDANSETRON 2 MG/ML
4 INJECTION INTRAMUSCULAR; INTRAVENOUS EVERY 4 HOURS PRN
Status: DISCONTINUED | OUTPATIENT
Start: 2020-11-16 | End: 2020-11-17 | Stop reason: HOSPADM

## 2020-11-16 RX ORDER — CALCIUM ACETATE 667 MG/1
2001 TABLET ORAL
Status: DISCONTINUED | OUTPATIENT
Start: 2020-11-16 | End: 2020-11-17 | Stop reason: HOSPADM

## 2020-11-16 RX ORDER — AMOXICILLIN 500 MG/1
500 CAPSULE ORAL 2 TIMES DAILY
Status: ON HOLD | COMMUNITY
Start: 2020-11-12 | End: 2020-11-17

## 2020-11-16 RX ORDER — LIDOCAINE HYDROCHLORIDE 10 MG/ML
1 INJECTION, SOLUTION INFILTRATION; PERINEURAL ONCE
Status: COMPLETED | OUTPATIENT
Start: 2020-11-16 | End: 2020-11-16

## 2020-11-16 RX ORDER — ROPINIROLE 1 MG/1
1 TABLET, FILM COATED ORAL
Status: DISCONTINUED | OUTPATIENT
Start: 2020-11-16 | End: 2020-11-17 | Stop reason: HOSPADM

## 2020-11-16 RX ORDER — TAMSULOSIN HYDROCHLORIDE 0.4 MG/1
0.8 CAPSULE ORAL EVERY EVENING
Status: DISCONTINUED | OUTPATIENT
Start: 2020-11-16 | End: 2020-11-17 | Stop reason: HOSPADM

## 2020-11-16 RX ORDER — TRAZODONE HYDROCHLORIDE 50 MG/1
150 TABLET ORAL 2 TIMES DAILY
Status: DISCONTINUED | OUTPATIENT
Start: 2020-11-16 | End: 2020-11-17 | Stop reason: HOSPADM

## 2020-11-16 RX ORDER — FLUTICASONE PROPIONATE 50 MCG
1 SPRAY, SUSPENSION (ML) NASAL DAILY
Status: DISCONTINUED | OUTPATIENT
Start: 2020-11-17 | End: 2020-11-17 | Stop reason: HOSPADM

## 2020-11-16 RX ORDER — ACETAMINOPHEN 325 MG/1
650 TABLET ORAL EVERY 6 HOURS PRN
Status: DISCONTINUED | OUTPATIENT
Start: 2020-11-16 | End: 2020-11-17 | Stop reason: HOSPADM

## 2020-11-16 RX ORDER — ONDANSETRON 4 MG/1
4 TABLET, ORALLY DISINTEGRATING ORAL EVERY 4 HOURS PRN
Status: DISCONTINUED | OUTPATIENT
Start: 2020-11-16 | End: 2020-11-17 | Stop reason: HOSPADM

## 2020-11-16 RX ADMIN — MONTELUKAST 10 MG: 10 TABLET, FILM COATED ORAL at 19:12

## 2020-11-16 RX ADMIN — ROPINIROLE HYDROCHLORIDE 1 MG: 1 TABLET, FILM COATED ORAL at 21:29

## 2020-11-16 RX ADMIN — TAMSULOSIN HYDROCHLORIDE 0.8 MG: 0.4 CAPSULE ORAL at 19:12

## 2020-11-16 RX ADMIN — TRAZODONE HYDROCHLORIDE 150 MG: 50 TABLET ORAL at 21:29

## 2020-11-16 RX ADMIN — SODIUM CHLORIDE 80 MG: 9 INJECTION, SOLUTION INTRAVENOUS at 09:57

## 2020-11-16 RX ADMIN — Medication 667 MG: at 19:11

## 2020-11-16 RX ADMIN — SODIUM CHLORIDE 8 MG/HR: 9 INJECTION, SOLUTION INTRAVENOUS at 12:07

## 2020-11-16 RX ADMIN — LIDOCAINE HYDROCHLORIDE 1 ML: 10 INJECTION, SOLUTION INFILTRATION; PERINEURAL at 16:15

## 2020-11-16 RX ADMIN — SODIUM CHLORIDE 8 MG/HR: 9 INJECTION, SOLUTION INTRAVENOUS at 22:41

## 2020-11-16 RX ADMIN — ROSUVASTATIN CALCIUM 10 MG: 10 TABLET, COATED ORAL at 19:11

## 2020-11-16 ASSESSMENT — FIBROSIS 4 INDEX: FIB4 SCORE: 1.92

## 2020-11-16 ASSESSMENT — ENCOUNTER SYMPTOMS
CONSTIPATION: 0
ORTHOPNEA: 0
PALPITATIONS: 0
FEVER: 0
SPUTUM PRODUCTION: 0
BRUISES/BLEEDS EASILY: 0
DOUBLE VISION: 0
WEIGHT LOSS: 0
CHILLS: 0
MYALGIAS: 0
HEMOPTYSIS: 0
TINGLING: 0
DIZZINESS: 1
NECK PAIN: 0
HEADACHES: 0
DIARRHEA: 0
BLURRED VISION: 0
TREMORS: 0
COUGH: 1
NAUSEA: 0
HEARTBURN: 0
DEPRESSION: 0
ABDOMINAL PAIN: 0
VOMITING: 0

## 2020-11-16 ASSESSMENT — PAIN DESCRIPTION - PAIN TYPE: TYPE: ACUTE PAIN

## 2020-11-16 NOTE — ED TRIAGE NOTES
"Chief Complaint   Patient presents with   • Abnormal Labs     Pt reports has been told Hemoglobin is low and may need a transfusion     BP (!) 95/48   Pulse (!) 104   Temp 36.8 °C (98.2 °F) (Temporal)   Resp 20   Ht 1.727 m (5' 8\")   Wt 76.3 kg (168 lb 3.4 oz)   SpO2 94%   BMI 25.58 kg/m²     Pt is scheduled for HD later this am  Covid Screen Negative.      "

## 2020-11-16 NOTE — H&P
Hospital Medicine History & Physical Note    Date of Service  11/16/2020    Primary Care Physician  Maryse Hartman M.D.    Consultants  GI   nephro    Code Status  Full Code    Chief Complaint  Chief Complaint   Patient presents with   • Abnormal Labs     Pt reports has been told Hemoglobin is low and may need a transfusion       History of Presenting Illness  80 y.o. male who presented 11/16/2020 with pmh of esrd, cad, pad, is coming today c/o weakness, lightheadedness, patient stated that his hb is low because his symptoms last time checked was 8.1, patient is been having melena but not hematochezia, hemoptysis or hematemesis, patient denies focal weakness, no fever chills, n/v/d/c no abdominal pain, no ill contact no recent travel, patient in the ER was found to have hb 6.7, his bun is 78, patient is on HD on MWF, patient was evaluated by GI and will be getting one unit of prbcs' EGD in am since patient is hemodynamically stable and no active bleeding at this time, patient will get HD today nephro has been consulted, patient is alert and oriented follows commands he expressed understanding of his plan of care and agreed with it, all questions answered.     Review of Systems  Review of Systems   Constitutional: Negative for chills, fever and weight loss.   HENT: Negative for congestion and nosebleeds.    Eyes: Negative for blurred vision and double vision.   Respiratory: Positive for cough (chronic). Negative for hemoptysis and sputum production.    Cardiovascular: Negative for chest pain, palpitations and orthopnea.   Gastrointestinal: Positive for melena. Negative for abdominal pain, constipation, diarrhea, heartburn, nausea and vomiting.   Genitourinary: Negative for dysuria, frequency and urgency.   Musculoskeletal: Negative for myalgias and neck pain.   Skin: Negative for itching and rash.   Neurological: Positive for dizziness. Negative for tingling, tremors and headaches.   Endo/Heme/Allergies: Does not  bruise/bleed easily.   Psychiatric/Behavioral: Negative for depression and suicidal ideas.       Past Medical History   has a past medical history of Anesthesia, Anticoagulation monitoring, special range, Aortic stenosis, Arterial leg ulcer ([facility]) (11/8/2018), Atrial flutter ([facility]) (6/12/2019), Basal cell carcinoma of left cheek (3/26/2015), BPH (7/14/2009), Bronchitis (12/25/2018), CAD (coronary artery disease) (2/20/2014), CATARACT, CHF (congestive heart failure), NYHA class II, chronic, systolic ([facility]) E F30 in setting of atrial flutter (6/13/2019), Chronic respiratory failure with hypoxia ([facility]) (5/8/2016), CKD (chronic kidney disease) stage 4, GFR 15-29 ml/min ([facility]) (1/15/2010), COPD (chronic obstructive pulmonary disease) ([facility]), Detached retina, Dialysis, EMPHYSEMA, Glaucoma, Gout, Heart burn, Heart murmur, Hypertension, Indigestion, Kidney cyst, Leg pain, bilateral (8/10/2015), On supplemental oxygen therapy, Peripheral vascular disease ([facility]) (8/10/2015), Pneumonia, Primary insomnia (3/22/2018), Proteinuria, PVD (peripheral vascular disease) ([facility]), and Sleep apnea.    Surgical History   has a past surgical history that includes cataract phaco with iol (4/8/08); av fistula creation (2/12/2010); av fistula revision (2/19/2010); av fistulogram (7/23/2010); angioplasty balloon (7/23/2010); av fistulogram (9/17/2010); angioplasty balloon (9/17/2010); vitrectomy posterior (1/18/2011); scleral buckling (1/18/2011); recovery (8/12/2011); vitrectomy posterior (10/11/2011); recovery (7/23/2012); recovery (1/29/2013); recovery (7/2/2013); av fistula thrombolysis (7/2/2013); recovery (12/17/2013); recovery (3/24/2014); recovery (7/29/2014); recovery (3/24/2015); recovery (12/23/2015); gastroscopy with biopsy (8/13/2016); colonoscopy - endo (8/15/2016); endoscopy procedure (3/21/2018); femoral endarterectomy (Left, 1/25/2019); femoral popliteal bypass (Left, 1/25/2019); angiogram (Left, 1/25/2019); other orthopedic surgery  (1998); av fistula creation (Right, 2/21/2019); lisa (6/13/2019); cardioversion (6/13/2019); av fistula creation (Right, 8/6/2019); cath placement (Right, 8/6/2019); stent placement (01/26/2020); gastroscopy-endo (2/7/2020); pr small bowel endoscopy,past 2nd duod (2/19/2020); colonoscopy - endo (2/19/2020); pr colonoscopy,diagnostic (2/19/2020); gastroscopy (2/19/2020); capsule endoscopy administration (N/A, 2/20/2020); capsule endoscopy retrieval (2/20/2020); pr colonoscopy,diagnostic (N/A, 2/23/2020); gastroscopy (N/A, 2/23/2020); pr colonoscopy,flex,w/control, bleeding (N/A, 2/24/2020); and gastroscopy w/push enterscopy (N/A, 2/24/2020).     Family History  family history includes Heart Disease in his brother; Hypertension in his brother, mother, and son; Lung Disease in his father; No Known Problems in his son; Stroke in his mother.     Social History   reports that he quit smoking about 11 years ago. His smoking use included cigarettes. He has a 40.00 pack-year smoking history. He has never used smokeless tobacco. He reports that he does not drink alcohol or use drugs.    Allergies  No Known Allergies    Medications  Prior to Admission Medications   Prescriptions Last Dose Informant Patient Reported? Taking?   B Complex-C-Folic Acid (DIALYVITE PO) 11/15/2020 at PM Patient Yes No   Sig: Take 1 Tab by mouth every evening.   ROPINIRole (REQUIP) 0.5 MG Tab 11/15/2020 at PM Patient No No   Sig: Take 2 Tabs by mouth every bedtime.   albuterol 108 (90 Base) MCG/ACT Aero Soln inhalation aerosol 11/15/2020 at AM Patient No No   Sig: Inhale 2 Puffs by mouth every four hours as needed for Shortness of Breath.   amoxicillin (AMOXIL) 500 MG Cap 11/15/2020 at Complete Patient Yes Yes   Sig: Take 500 mg by mouth 2 times a day. Pt finished a 5 day course of AMOXIL on 11/15   calcium acetate (PHOS-LO) 667 MG Cap 11/15/2020 at PM Patient Yes No   Sig: Take 1,334-2,001 mg by mouth See Admin Instructions. Pt reports that he takes 2  cap for each snack and 3 cap 3 times a day with meals   fluticasone (FLONASE) 50 MCG/ACT nasal spray 11/15/2020 at AM Patient No No   Sig: SPRAY ONE TO TWO SPRAYS IN EACH NOSTRIL ONCE DAILY *FLONASE*   montelukast (SINGULAIR) 10 MG Tab 11/15/2020 at PM Patient No No   Sig: Take 1 Tab by mouth every day.   omeprazole (PRILOSEC) 40 MG delayed-release capsule 11/15/2020 at PM Patient No No   Sig: Take 1 Cap by mouth every day.   rosuvastatin (CRESTOR) 40 MG tablet 11/15/2020 at PM Patient No No   Sig: Take 1 Tab by mouth every evening.   tamsulosin (FLOMAX) 0.4 MG capsule 11/15/2020 at PM Patient No No   Sig: Take 2 Caps by mouth every evening.   torsemide (DEMADEX) 10 MG tablet 11/15/2020 at AM Patient No No   Sig: Take 3 Tabs by mouth every day.   Patient taking differently: Take 30 mg by mouth every morning. 3 tablets = 30 mg   traZODone (DESYREL) 150 MG Tab 11/15/2020 at PM Patient No No   Sig: Take 1 Tab by mouth See Admin Instructions. Pt takes 150MG @ 2130 and another 150MG @ 0130      Facility-Administered Medications: None       Physical Exam  Temp:  [36.6 °C (97.9 °F)-36.8 °C (98.2 °F)] 36.6 °C (97.9 °F)  Pulse:  [] 87  Resp:  [15-30] 25  BP: ()/(48-86) 127/59  SpO2:  [44 %-100 %] 100 %    Physical Exam  Vitals signs and nursing note reviewed.   Constitutional:       Appearance: Normal appearance.   HENT:      Head: Normocephalic.      Nose: Nose normal.      Mouth/Throat:      Mouth: Mucous membranes are moist.      Pharynx: Oropharynx is clear.   Eyes:      General: No scleral icterus.     Conjunctiva/sclera: Conjunctivae normal.      Pupils: Pupils are equal, round, and reactive to light.   Neck:      Musculoskeletal: Normal range of motion and neck supple.   Cardiovascular:      Rate and Rhythm: Normal rate and regular rhythm.      Heart sounds: Normal heart sounds.   Pulmonary:      Effort: Pulmonary effort is normal. No respiratory distress.      Breath sounds: Normal breath sounds. No  wheezing.   Abdominal:      General: Bowel sounds are normal. There is no distension.      Palpations: Abdomen is soft.      Tenderness: There is no abdominal tenderness. There is no guarding.   Musculoskeletal: Normal range of motion.         General: No swelling.      Comments: Decreased pedal pulses  2x2 cm ulcer on lateral ankle no signs of infection.    Skin:     General: Skin is warm and dry.      Capillary Refill: Capillary refill takes less than 2 seconds.      Coloration: Skin is pale. Skin is not jaundiced.   Neurological:      Mental Status: He is alert and oriented to person, place, and time.      Cranial Nerves: No cranial nerve deficit.      Motor: No weakness.   Psychiatric:         Mood and Affect: Mood normal.         Laboratory:  Recent Labs     11/16/20  0850   WBC 5.1   RBC 2.15*   HEMOGLOBIN 6.7*   HEMATOCRIT 22.2*   .3*   MCH 31.2   MCHC 30.2*   RDW 57.5*   PLATELETCT 205   MPV 10.2     Recent Labs     11/16/20  0850   SODIUM 136   POTASSIUM 4.8   CHLORIDE 92*   CO2 22   GLUCOSE 114*   BUN 78*   CREATININE 9.83*   CALCIUM 10.2     Recent Labs     11/16/20  0850   ALTSGPT 13   ASTSGOT 26   ALKPHOSPHAT 134*   TBILIRUBIN 0.3   GLUCOSE 114*     Recent Labs     11/16/20  0850   INR 1.03     No results for input(s): NTPROBNP in the last 72 hours.      Recent Labs     11/16/20  0850   TROPONINT 226*       Imaging:  DX-CHEST-PORTABLE (1 VIEW)   Final Result      1.  Cardiomegaly with interstitial prominence.      2.  Minimal right pleural effusion.            Assessment/Plan:  I anticipate this patient will require at least two midnights for appropriate medical management, necessitating inpatient admission.    ESRD (end stage renal disease) on dialysis ([facility])- (present on admission)  Assessment & Plan  HD MWF, follows with [facility] nephrology  nephro consulted  Hd today.   BUN is elevated HD will help to decreased uremia.     PAD (peripheral artery disease) ([facility])- (present on  admission)  Assessment & Plan  Chronic needs to f/u with Dr Peters as outpatient  Wound care consulted for non healing small ulcer in right ankle    Coronary artery disease- (present on admission)  Assessment & Plan  Stable no chest pain.   He is not on ASA or plavix anymore.   Continue monitoring.     Anemia  Assessment & Plan  Acute on chronic due to blood lost GIB, requiring one unit of prbcs.   Will check iron, ferritin, retic count, vit b12 folate.     GIB (gastrointestinal bleeding)  Assessment & Plan  With melena. GI consulted.   Requiring one unit of blood.   egd tomorrow. Continue close monitoring.   Hb q8hrs.   Telemetry.     BPH (benign prostatic hypertrophy)- (present on admission)  Assessment & Plan  Continue flomax.     dvt prophylaxis scd's.

## 2020-11-16 NOTE — ED NOTES
Federico from Lab called with critical result of Troponin 226 at 0928. Critical lab result read back to Federico.   Dr. Hensley notified of critical lab result at 0929 Critical lab result read back by Dr. Hensley.

## 2020-11-16 NOTE — ASSESSMENT & PLAN NOTE
Acute on chronic due to blood lost GIB, requiring one unit of prbcs.   Will check iron, ferritin, retic count, vit b12 folate.

## 2020-11-16 NOTE — CONSULTS
"                                             Gastroenterology Consult Note     Date of Consult: 11/16/2020  Referring Physician: Melvin     Reason for consult: Melena        HPI: This is a 81 yo male with history of CAD, COPD, and CKD on HD who presents with complaints of weakness. He says that he has been getting his usual dialysis and for the last week he has felt more fatigue. He says that he has seen dark stools. He denies BRBPR. He says that this is similar to his multiple GI bleeding issues in the past. He denies abdominal pain. He reports no nausea or vomiting. He has no diarrhea or constipation. He has had multiple endoscopies for evaluation of his chronic anemia and they have been unsuccessful at finding the source for his bleeds. We are consulted again for consideration of additional endoscopy to locate the source of bleed.     PMHX:  Past Medical History:   Diagnosis Date   • Anesthesia     \"needs more sedation\" (can sometimes hear MD during procedure)    • Anticoagulation monitoring, special range    • Aortic stenosis     mod AS- concern for low flow severe AS with rEFof 30%   • Arterial leg ulcer ([facility]) 11/8/2018   • Atrial flutter ([facility]) 6/12/2019   • Basal cell carcinoma of left cheek 3/26/2015   • BPH 7/14/2009   • Bronchitis 12/25/2018   • CAD (coronary artery disease) 2/20/2014   • CATARACT     sanjuanita surgery complete   • CHF (congestive heart failure), NYHA class II, chronic, systolic ([facility]) E F30 in setting of atrial flutter 6/13/2019   • Chronic respiratory failure with hypoxia ([facility]) 5/8/2016   • CKD (chronic kidney disease) stage 4, GFR 15-29 ml/min ([facility]) 1/15/2010    end stage renal disease   • COPD (chronic obstructive pulmonary disease) ([facility])     wears 2.5 L o2 sometimes when he sleeps   • Detached retina    • Dialysis     m,w,f [facility]   • EMPHYSEMA    • Glaucoma     Early stage   • Gout    • Heart burn     occas   • Heart murmur     maria esther lee cardiologist   • Hypertension    • " Indigestion     occas   • Kidney cyst    • Leg pain, bilateral 8/10/2015   • On supplemental oxygen therapy    • Peripheral vascular disease (HCC) 8/10/2015   • Pneumonia    • Primary insomnia 3/22/2018   • Proteinuria    • PVD (peripheral vascular disease) (HCC)    • Sleep apnea     O2 PER CANULA  AT NIGHT 2l/m          PSurgHx:   Past Surgical History:   Procedure Laterality Date   • PB COLONOSCOPY,FLEX,W/CONTROL, BLEEDING N/A 2/24/2020    Procedure: COLONOSCOPY, WITH ARGON PLASMA COAGULATION;  Surgeon: Juan Matthews M.D.;  Location: SURGERY SAME DAY [facility];  Service: Gastroenterology   • GASTROSCOPY W/PUSH ENTERSCOPY N/A 2/24/2020    Procedure: GASTROSCOPY, WITH PUSH ENTEROSCOPY;  Surgeon: Juan Matthews M.D.;  Location: SURGERY SAME DAY [facility];  Service: Gastroenterology   • PB COLONOSCOPY,DIAGNOSTIC N/A 2/23/2020    Procedure: COLONOSCOPY;  Surgeon: Enrique Child D.O.;  Location: SURGERY [facility];  Service: Gastroenterology   • GASTROSCOPY N/A 2/23/2020    Procedure: GASTROSCOPY;  Surgeon: Enrique Child D.O.;  Location: SURGERY [facility];  Service: Gastroenterology   • CAPSULE ENDOSCOPY ADMINISTRATION N/A 2/20/2020    Procedure: ADMINISTRATION, CAPSULE, FOR CAPSULE ENDOSCOPY;  Surgeon: Gucci Westbrook M.D.;  Location: ENDOSCOPY [facility];  Service: Gastroenterology   • CAPSULE ENDOSCOPY RETRIEVAL  2/20/2020    Procedure: CAPSULE ENDOSCOPY RETRIEVAL;  Surgeon: Gucci Westbrook M.D.;  Location: ENDOSCOPY [facility];  Service: Gastroenterology   • PB SMALL BOWEL ENDOSCOPY,PAST 2ND DUOD  2/19/2020        • COLONOSCOPY - ENDO  2/19/2020        • PB COLONOSCOPY,DIAGNOSTIC  2/19/2020    Procedure: COLONOSCOPY;  Surgeon: Gucci Westbrook M.D.;  Location: SURGERY [facility];  Service: Gastroenterology   • GASTROSCOPY  2/19/2020    Procedure: GASTROSCOPY;  Surgeon: Gucci Westbrook M.D.;  Location: SURGERY [facility];  Service: Gastroenterology   •  GASTROSCOPY-ENDO  2/7/2020    Procedure: GASTROSCOPY;  Surgeon: Jong Carrasquillo M.D.;  Location: ENDOSCOPY [facility]  Service: Gastroenterology   • STENT PLACEMENT  01/26/2020    lda   • AV FISTULA CREATION Right 8/6/2019    Procedure: CREATION, AV FISTULA -  RIGHT ARM  ,  and Ligation of Left Arm Fistula;  Surgeon: Sera Peters M.D.;  Location: SURGERY [facility];  Service: General   • CATH PLACEMENT Right 8/6/2019    Procedure: INSERTION, CATHETER - PERMA ,;  Surgeon: Sera Peters M.D.;  Location: SURGERY [facility];  Service: General   • JUSTIN  6/13/2019    Procedure: ECHOCARDIOGRAM, TRANSESOPHAGEAL;  Surgeon: Harvinder Hoang M.D.;  Location: SURGERY [facility];  Service: Cardiac   • CARDIOVERSION  6/13/2019    Procedure: CARDIOVERSION;  Surgeon: Harvinder Hoang M.D.;  Location: [facility];  Service: Cardiac   • AV FISTULA CREATION Right 2/21/2019    Procedure: AV FISTULA CREATION - ARM;  Surgeon: David Cason M.D.;  Location: SURGERY [facility];  Service: General   • FEMORAL ENDARTERECTOMY Left 1/25/2019    Procedure: FEMORAL ENDARTERECTOMY;  Surgeon: David Cason M.D.;  Location: SURGERY [facility];  Service: General   • FEMORAL POPLITEAL BYPASS Left 1/25/2019    Procedure: LEFT FEMORAL POPLITEAL POLYTETRAFLUOROETHYLENE ePTFE(PROPATEN VASCULAR GRAFT) BYPASS;  Surgeon: David Cason M.D.;  Location: SURGERY [facility];  Service: General   • ANGIOGRAM Left 1/25/2019    Procedure: LEFT LEG ANGIOGRAM;  Surgeon: David Cason M.D.;  Location: SURGERY [facility];  Service: General   • ENDOSCOPY PROCEDURE  3/21/2018    Procedure: ENDOSCOPY PROCEDURE/UPPER;  Surgeon: Enrique Child D.O.;  Location: [facility];  Service: Gastroenterology   • COLONOSCOPY - ENDO  8/15/2016    Procedure: COLONOSCOPY - ENDO;  Surgeon: Mane Whatley M.D.;  Location: ENDOSCOPY [facility]  Service:    • GASTROSCOPY WITH  BIOPSY  8/13/2016    Procedure: GASTROSCOPY WITH BIOPSY;  Surgeon: Jorge Leavitt M.D.;  Location: ENDOSCOPY [facility];  Service:    • RECOVERY  12/23/2015    Procedure: VASCULAR CASE-BLANKA-LEFT ARM FISTULOGRAM WITH ANGIOPLASTY;  Surgeon: Recoveryonly Surgery;  Location: SURGERY PRE-POST PROC UNIT [facility];  Service:    • RECOVERY  3/24/2015    Performed by [facility] Surgery at SURGERY PRE-POST PROC UNIT [facility]   • RECOVERY  7/29/2014    Performed by [facility] Surgery at SURGERY SAME DAY [facility] ORS   • RECOVERY  3/24/2014    Performed by [facility] at SURGERY [facility]   • RECOVERY  12/17/2013    Performed by [facility] at SURGERY SAME DAY [facility] ORS   • RECOVERY  7/2/2013    Performed by [facility] at SURGERY SAME DAY [facility] ORS   • AV FISTULA THROMBOLYSIS  7/2/2013    Performed by David Cason M.D. at SURGERY [facility]   • RECOVERY  1/29/2013    Performed by [facility] Surgery at SURGERY SAME DAY [facility] ORS   • RECOVERY  7/23/2012    Performed by SURGERY, IR-RECOVERY at SURGERY SAME DAY [facility] ORS   • VITRECTOMY POSTERIOR  10/11/2011    Performed by NAHUM GE at SURGERY SAME DAY [facility] ORS   • RECOVERY  8/12/2011    Performed by SURGERY, IR-RECOVERY at SURGERY SAME DAY [facility] ORS   • VITRECTOMY POSTERIOR  1/18/2011    Performed by NAHUM GE at SURGERY SAME DAY [facility] ORS   • SCLERAL BUCKLING  1/18/2011    Performed by NAHUM GE at SURGERY SAME DAY [facility] ORS   • AV FISTULOGRAM  9/17/2010    Performed by DAVID CASON at SURGERY [facility] ORS   • ANGIOPLASTY BALLOON  9/17/2010    Performed by DAVID CASON at SURGERY [facility] ORS   • AV FISTULOGRAM  7/23/2010    Performed by DAVID CASON at SURGERY [facility] ORS   • ANGIOPLASTY BALLOON  7/23/2010    Performed by DAVID CASON at SURGERY [facility] ORS   • AV FISTULA REVISION  2/19/2010    Performed by WILLIE HATCH at [facility]  ORS   • AV FISTULA CREATION  2010    Performed by ALESHIA MOSCOSO at SURGERY [facility] ORS   • CATARACT PHACO WITH IOL  08    Performed by STACEY PHILLIPS at SURGERY SAME DAY [facility] ORS   • OTHER ORTHOPEDIC SURGERY  1998    right toe for facititis        ALLERGIES:Patient has no known allergies.     SocHx:   Social History     Socioeconomic History   • Marital status:      Spouse name: Not on file   • Number of children: Not on file   • Years of education: Not on file   • Highest education level: Not on file   Occupational History   • Not on file   Social Needs   • Financial resource strain: Not on file   • Food insecurity     Worry: Not on file     Inability: Not on file   • Transportation needs     Medical: Not on file     Non-medical: Not on file   Tobacco Use   • Smoking status: Former Smoker     Packs/day: 1.00     Years: 40.00     Pack years: 40.00     Types: Cigarettes     Quit date: 2009     Years since quittin.8   • Smokeless tobacco: Never Used   Substance and Sexual Activity   • Alcohol use: No     Alcohol/week: 0.0 oz   • Drug use: No   • Sexual activity: Never     Partners: Female     Comment: , two sons, 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   Lifestyle   • Physical activity     Days per week: Not on file     Minutes per session: Not on file   • Stress: Not on file   Relationships   • Social connections     Talks on phone: Not on file     Gets together: Not on file     Attends Bahai service: Not on file     Active member of club or organization: Not on file     Attends meetings of clubs or organizations: Not on file     Relationship status: Not on file   • Intimate partner violence     Fear of current or ex partner: Not on file     Emotionally abused: Not on file     Physically abused: Not on file     Forced sexual activity: Not on file   Other Topics Concern   • Not on file   Social History Narrative   • Not on file        FAMHx:   Family History   Problem Relation Age  of Onset   • Stroke Mother    • Hypertension Mother    • Lung Disease Father         Emphysema, resp failure   • Hypertension Brother    • Heart Disease Brother    • Hypertension Son    • No Known Problems Son         ROS:  Constitutional: No fevers, chills, no night sweats, no weight changes  HEENT: no vision or hearing changes, no dry mouth, no change in smell  CARDIO: no palpitations, no orthopnea, no chest pain  PULM: + cough, no shortness of breath  NEURO: no Seizures, no memory impairment, no change in sensation, +dizziness  GI: as above  : no dysuria, no hematuria  HEME: no anemia, no easy brusing  MUSCULOSKELETAL: no muscle aches, no back pain, no arthritis  PSYCH: no anxiety or depression  SKIN: no rashes     PE:  Vitals:    11/16/20 1055 11/16/20 1226 11/16/20 1315 11/16/20 1330   BP: 129/65 133/61     Pulse: 93  85 83   Resp: (!) 21   (!) 22   Temp: 36.7 °C (98 °F)      TempSrc: Temporal      SpO2: 100%      Weight:       Height:         Gen: AAOx3, NAD, lying in bed  HEENT: PERRL, EOMI, nares patent, Mucous membranes moist  Neck: supple, no cervical or supraclavicular adenopathy  CVS: regular rhythm, normal rate, no MRG  Pulm: CTAB, no crackles  Abd: soft, Nd, NT, no guarding or rebound  Ext: no edema, normal sensation, left arm AVF  NEURO: grossly normal, no weakness  Skin: warm, no rash  Psych: normal Affect, no anxiety     LABS:  Lab Results   Component Value Date/Time    SODIUM 136 11/16/2020 08:50 AM    POTASSIUM 4.8 11/16/2020 08:50 AM    CHLORIDE 92 (L) 11/16/2020 08:50 AM    CO2 22 11/16/2020 08:50 AM    GLUCOSE 114 (H) 11/16/2020 08:50 AM    BUN 78 (HH) 11/16/2020 08:50 AM    CREATININE 9.83 (HH) 11/16/2020 08:50 AM    CREATININE 2.1 (H) 05/06/2009 10:08 AM      Lab Results   Component Value Date/Time    WBC 5.1 11/16/2020 08:50 AM    RBC 2.15 (L) 11/16/2020 08:50 AM    HEMOGLOBIN 6.7 (L) 11/16/2020 08:50 AM    HEMATOCRIT 22.2 (L) 11/16/2020 08:50 AM    .3 (H) 11/16/2020 08:50 AM    MCH  31.2 11/16/2020 08:50 AM    MCHC 30.2 (L) 11/16/2020 08:50 AM    MPV 10.2 11/16/2020 08:50 AM    NEUTSPOLYS 77.60 (H) 11/16/2020 08:50 AM    LYMPHOCYTES 11.70 (L) 11/16/2020 08:50 AM    MONOCYTES 6.50 11/16/2020 08:50 AM    EOSINOPHILS 3.60 11/16/2020 08:50 AM    EOSINOPHILS 16 03/05/2020 03:30 PM    BASOPHILS 0.40 11/16/2020 08:50 AM    HYPOCHROMIA 1+ 03/26/2020 04:30 PM    ANISOCYTOSIS 1+ 07/04/2020 10:36 AM        Lab Results   Component Value Date/Time    PROTHROMBTM 13.2 11/16/2020 08:50 AM    INR 1.03 11/16/2020 08:50 AM      Recent Labs     11/16/20  0850   ASTSGOT 26   ALTSGPT 13   TBILIRUBIN 0.3   GLOBULIN 3.1   INR 1.03          Problem List Items Addressed This Visit     None      Visit Diagnoses     Upper GI bleed        ESRD (end stage renal disease) (HCC)        Symptomatic anemia               ASSESSMENT: 81 yo male with long standing history of occult GI bleeding. He has had workup and multiple endoscopies for this in the past but a source for his bleeding has been difficult to find. He is hesitant to have another procedure but given his anemia and reports of dark stool, repeat EGD seems warranted     PLAN:   1) NPO at midnight  2) renal diet today  3) BID PPI  4) EGD tomorrow      Thank you for this consult.     Juan Matthews MD

## 2020-11-16 NOTE — ED NOTES
Med Rec complete per pt and pt's wife  Allergies Reviewed     Pt finished a 5 day course of AMOXIL on 11/15.    Pt no longer taking any blood thinners, pt reports he has been off PLAVIX for months.

## 2020-11-16 NOTE — ED NOTES
Pt updated on no inpatient room available, still awaiting dialysis RN to arrive. Denies needs at this time other than wanting to get more comfortable and be able to watch TV. Pt A&Ox4, wife at bedside.

## 2020-11-16 NOTE — CONSULTS
"[facility] -  CONSULTATION NOTE               Author: Benjamin Beatty M.D. Date & Time: 11/16/2020  2:21 PM       REASON FOR CONSULTATION:   - Inpatient hemodialysis management.    CHIEF COMPLAINT:   -  \"I'm feeling weak\"    HISTORY OF PRESENT ILLNESS:    81 yo M PMH ESRD MWF iHD, HTN, anemia of CKD, and CKD-MBD who presents to ED with CC as above.  He has been having progressively worsening weakness and light-headedness and was getting concerned because last time this happened he was found to be anemic due to GI bleed.  During that visit a thorough work up was unrevealing for source of bleed.  At the HD unit they have been tracking his Hgb and it has slowly been dropping, but was never critically low.  In ED he was found to be at 6.7 with BUN 78.  He also had black stools this AM which has not been the case otherwise.  He was given 1 unit pRBC and he already feels much better.  GI evaluated him and planned for endoscopy tomorrow.  He is admitted for further observation.  Otherwise denies any other symptoms.  Wife was at bedside and she states that prior to this his Hgb was almost at 11 and he had never looked or felt better.  He was helping her with housework and very active.    REVIEW OF SYSTEMS:    GEN: No F/C  CV: No CP; No palpitations  RESP: No Cough; No SOB  GI: No pain; No N/V  MSK: No pain  All other systems reviewed and are negative    PAST MEDICAL HISTORY:   - ESRD MWF iHD  - HTN  - Anemia of CKD  - CKD-MBD  - HLD  - CAD  - COPD  - Glaucoma  - AFlutte  - GERD  - PVD  - HELGA  - Chronic systolic HF, EF 30%    PAST SURGICAL HISTORY:   - AVF creation  - Multiple AVF revisions and fistulagrams  - Vitrectomy multiple times  - Gastroscopy  - LHC  - BLE angiogram  - Fem-Pop bypass  - Capsule endoscopy  - Colonoscopy    FAMILY HISTORY:   - Reviewed and non contributory to current illness    SOCIAL HISTORY:   - No tobacco, quit 11 years ago  - No EtOH  - No illicits    HOME MEDICATIONS:   - " "Reviewed and documented in chart    LABORATORY STUDIES:   - Reviewed and documented in chart    ALLERGIES:  Patient has no known allergies.    PHYSICAL EXAM:  VS:  /61   Pulse 83   Temp 36.7 °C (98 °F) (Temporal)   Resp (!) 22   Ht 1.727 m (5' 8\")   Wt 76.3 kg (168 lb 3.4 oz)   SpO2 100%   BMI 25.58 kg/m²   GEN: WDWN NAD  HENT: NC/AT; atraumatic external nose  EYES: No icterus  CVS: RRR; No m/r  RESP: CTA B, non-labored  GI: SNTND; +BS  MSK: No C/C; Ankle with 2x2cm ulcer, with no sign of active infection  SKIN: No rash    LABS:  Recent Results (from the past 24 hour(s))   CBC WITH DIFFERENTIAL    Collection Time: 11/16/20  8:50 AM   Result Value Ref Range    WBC 5.1 4.8 - 10.8 K/uL    RBC 2.15 (L) 4.70 - 6.10 M/uL    Hemoglobin 6.7 (L) 14.0 - 18.0 g/dL    Hematocrit 22.2 (L) 42.0 - 52.0 %    .3 (H) 81.4 - 97.8 fL    MCH 31.2 27.0 - 33.0 pg    MCHC 30.2 (L) 33.7 - 35.3 g/dL    RDW 57.5 (H) 35.9 - 50.0 fL    Platelet Count 205 164 - 446 K/uL    MPV 10.2 9.0 - 12.9 fL    Neutrophils-Polys 77.60 (H) 44.00 - 72.00 %    Lymphocytes 11.70 (L) 22.00 - 41.00 %    Monocytes 6.50 0.00 - 13.40 %    Eosinophils 3.60 0.00 - 6.90 %    Basophils 0.40 0.00 - 1.80 %    Immature Granulocytes 0.20 0.00 - 0.90 %    Nucleated RBC 0.00 /100 WBC    Neutrophils (Absolute) 3.92 1.82 - 7.42 K/uL    Lymphs (Absolute) 0.59 (L) 1.00 - 4.80 K/uL    Monos (Absolute) 0.33 0.00 - 0.85 K/uL    Eos (Absolute) 0.18 0.00 - 0.51 K/uL    Baso (Absolute) 0.02 0.00 - 0.12 K/uL    Immature Granulocytes (abs) 0.01 0.00 - 0.11 K/uL    NRBC (Absolute) 0.00 K/uL   COMP METABOLIC PANEL    Collection Time: 11/16/20  8:50 AM   Result Value Ref Range    Sodium 136 135 - 145 mmol/L    Potassium 4.8 3.6 - 5.5 mmol/L    Chloride 92 (L) 96 - 112 mmol/L    Co2 22 20 - 33 mmol/L    Anion Gap 22.0 (H) 7.0 - 16.0    Glucose 114 (H) 65 - 99 mg/dL    Bun 78 (HH) 8 - 22 mg/dL    Creatinine 9.83 (HH) 0.50 - 1.40 mg/dL    Calcium 10.2 8.4 - 10.2 mg/dL    " AST(SGOT) 26 12 - 45 U/L    ALT(SGPT) 13 2 - 50 U/L    Alkaline Phosphatase 134 (H) 30 - 99 U/L    Total Bilirubin 0.3 0.1 - 1.5 mg/dL    Albumin 3.5 3.2 - 4.9 g/dL    Total Protein 6.6 6.0 - 8.2 g/dL    Globulin 3.1 1.9 - 3.5 g/dL    A-G Ratio 1.1 g/dL   TROPONIN    Collection Time: 20  8:50 AM   Result Value Ref Range    Troponin T 226 (H) 6 - 19 ng/L   Prothrombin Time    Collection Time: 20  8:50 AM   Result Value Ref Range    PT 13.2 12.0 - 14.6 sec    INR 1.03 0.87 - 1.13   COD - Adult (Type and Screen)    Collection Time: 20  8:50 AM   Result Value Ref Range    ABO Grouping Only O     Rh Grouping Only POS     Antibody Screen-Cod NEG     Component R       R4                  Red Blood Cells     [id number]   issued       20   10:25      Product Type Red Blood Cells LR Pheresis     Dispense Status issued     Unit Number (Barcoded) [id number]     Product Code (Barcoded) F8354C28     Blood Type (Barcoded) 5100    ESTIMATED GFR    Collection Time: 20  8:50 AM   Result Value Ref Range    GFR If  6 (A) >60 mL/min/1.73 m 2    GFR If Non African American 5 (A) >60 mL/min/1.73 m 2   EKG    Collection Time: 20  9:07 AM   Result Value Ref Range    Report       [facility] Emergency Dept.    Test Date:  2020  Pt Name:    EDVIN COUGHLINERTCHERI       Department: [facility]  MRN:        [id number]                      [facility]   Gender:     Male                         Technician: ZSOFIE  :        [id number]                   Requested By:MARLON DAIGLE  Order #:    [id number]                    Reading MD: MARLON DAIGLE MD    Measurements  Intervals                                Axis  Rate:       96                           P:          -27  DE:         151                          QRS:        -37  QRSD:       136                          T:          32  QT:         421  QTc:        533    Interpretive Statements  Sinus  rhythm  Right bundle branch block  Baseline wander in lead(s) V5  Compared to ECG 02/24/2020 14:38:23  No significant changes  Electronically Signed On 11- 10:14:09 PST by MARLON DAIGLE MD     Routine (COVID/SARS COV-2 In-House PCR up to 24 hours)    Collection Time: 11/16/20 10:50 AM    Specimen: Nasopharyngeal; Respirate   Result Value Ref Range    COVID Order Status Received    SARS-CoV-2, PCR (In-House)    Collection Time: 11/16/20 10:50 AM   Result Value Ref Range    SARS-CoV-2 Source NP Swab        (click the triangle to expand results)    IMAGING:  DX-CHEST-PORTABLE (1 VIEW)   Final Result      1.  Cardiomegaly with interstitial prominence.      2.  Minimal right pleural effusion.        IMPRESSION:  - ESRD    * Etiology likely 2/2 HTN    * MWF iHD at [facility]  - GI Bleed    * Source unknown    * S/P pRBC transfusion  - HTN    * Goal BP < 140/90    * Stable  - Anemia of CKD    * Goal Hgb 10-11    * Stable  - CKD-MBD    * Managed at HD unit  - Chronic systolic HF  - HLD  - CAD    PLAN:  - MWF iHD during stay  - UF as tolerated  - No dietary protein restrictions  - Dose all meds per ESRD  - FU GI rec's  - Transfuse as needed to maintain Hgb > 7    Thank you for the consultation!

## 2020-11-16 NOTE — ASSESSMENT & PLAN NOTE
Chronic needs to f/u with Dr Peters as outpatient  Wound care consulted for non healing small ulcer in right ankle

## 2020-11-16 NOTE — ED PROVIDER NOTES
ED Provider Note    ED Provider Note    Primary care provider: Maryse Hartman M.D.  Means of arrival: Walk-in  History obtained from: Patient    CHIEF COMPLAINT  Chief Complaint   Patient presents with   • Abnormal Labs     Pt reports has been told Hemoglobin is low and may need a transfusion     Seen at 8:37 AM.   HPI  Parmjit Castelan is a 80 y.o. male who presents to the Emergency Department for possible anemia.  He states that his hemoglobin was 8.1 on his last check last week.  He feels generally weak for the past several days, he has also had some dark stool.  He feels lightheaded occasionally.  He does have a cough but this is chronic consistent with his COPD.  He denies any fevers, chills, nausea, vomiting, abdominal pain, chest pain.  He no longer takes aspirin or Plavix.  He is on a PPI.  He denies any NSAID use.  Last hemodialysis was 72 hours ago, he is scheduled this afternoon.    REVIEW OF SYSTEMS  See HPI,   Remainder of ROS negative.     PAST MEDICAL HISTORY   has a past medical history of Anesthesia, Anticoagulation monitoring, special range, Aortic stenosis, Arterial leg ulcer ([facility]) (11/8/2018), Atrial flutter ([facility]) (6/12/2019), Basal cell carcinoma of left cheek (3/26/2015), BPH (7/14/2009), Bronchitis (12/25/2018), CAD (coronary artery disease) (2/20/2014), CATARACT, CHF (congestive heart failure), NYHA class II, chronic, systolic ([facility]) E F30 in setting of atrial flutter (6/13/2019), Chronic respiratory failure with hypoxia ([facility]) (5/8/2016), CKD (chronic kidney disease) stage 4, GFR 15-29 ml/min ([facility]) (1/15/2010), COPD (chronic obstructive pulmonary disease) ([facility]), Detached retina, Dialysis, EMPHYSEMA, Glaucoma, Gout, Heart burn, Heart murmur, Hypertension, Indigestion, Kidney cyst, Leg pain, bilateral (8/10/2015), On supplemental oxygen therapy, Peripheral vascular disease ([facility]) (8/10/2015), Pneumonia, Primary insomnia (3/22/2018), Proteinuria, PVD (peripheral vascular disease) ([facility]), and Sleep  apnea.    SURGICAL HISTORY   has a past surgical history that includes cataract phaco with iol (08); av fistula creation (2010); av fistula revision (2010); av fistulogram (2010); angioplasty balloon (2010); av fistulogram (2010); angioplasty balloon (2010); vitrectomy posterior (2011); scleral buckling (2011); recovery (2011); vitrectomy posterior (10/11/2011); recovery (2012); recovery (2013); recovery (2013); av fistula thrombolysis (2013); recovery (2013); recovery (3/24/2014); recovery (2014); recovery (3/24/2015); recovery (2015); gastroscopy with biopsy (2016); colonoscopy - endo (8/15/2016); endoscopy procedure (3/21/2018); femoral endarterectomy (Left, 2019); femoral popliteal bypass (Left, 2019); angiogram (Left, 2019); other orthopedic surgery (); av fistula creation (Right, 2019); lisa (2019); cardioversion (2019); av fistula creation (Right, 2019); cath placement (Right, 2019); stent placement (2020); gastroscopy-endo (2020); small bowel endoscopy,past 2nd duod (2020); colonoscopy - endo (2020); colonoscopy,diagnostic (2020); gastroscopy (2020); capsule endoscopy administration (N/A, 2020); capsule endoscopy retrieval (2020); colonoscopy,diagnostic (N/A, 2020); gastroscopy (N/A, 2020); colonoscopy,flex,w/control, bleeding (N/A, 2020); and gastroscopy w/push enterscopy (N/A, 2020).    SOCIAL HISTORY  Social History     Tobacco Use   • Smoking status: Former Smoker     Packs/day: 1.00     Years: 40.00     Pack years: 40.00     Types: Cigarettes     Quit date: 2009     Years since quittin.8   • Smokeless tobacco: Never Used   Substance Use Topics   • Alcohol use: No     Alcohol/week: 0.0 oz   • Drug use: No      Social History     Substance and Sexual Activity   Drug Use No       FAMILY HISTORY  Family History  "  Problem Relation Age of Onset   • Stroke Mother    • Hypertension Mother    • Lung Disease Father         Emphysema, resp failure   • Hypertension Brother    • Heart Disease Brother    • Hypertension Son    • No Known Problems Son        CURRENT MEDICATIONS  Reviewed.  See Encounter Summary.     ALLERGIES  No Known Allergies    PHYSICAL EXAM  VITAL SIGNS: /86   Pulse 80   Temp 36.8 °C (98.2 °F) (Temporal)   Resp (!) 23   Ht 1.727 m (5' 8\")   Wt 76.3 kg (168 lb 3.4 oz)   SpO2 100%   BMI 25.58 kg/m²   Constitutional: Awake, alert in no apparent distress.  HENT: Normocephalic, Bilateral external ears normal. Nose normal.   Eyes: Conjunctiva normal, non-icteric, EOMI.    Thorax & Lungs: Easy unlabored respirations, Clear to ascultation bilaterally.  Cardiovascular: Borderline tachycardic, No murmurs, rubs or gallops. Bilateral pulses symmetrical.   Abdomen:  Soft, nontender, nondistended, normal active bowel sounds.   Dark stool, not melanotic, FOBT positive, control acceptable   :    Skin: Visualized skin is  Dry, No erythema, No rash.   Musculoskeletal:   No cyanosis, clubbing or edema. No leg asymmetry.   Neurologic: Alert, Grossly non-focal.   Psychiatric: Normal affect, Normal mood  Lymphatic:  No cervical LAD    EKG   12 lead Interpreted by me  Rhythm:  Normal sinus rhythm   Rate: 96  Axis: normal  Ectopy: none  Conduction: RBBB  ST Segments: no acute change  T Waves: no acute change  Clinical Impression: Normal sinus rhythm, right bundle branch block seen on prior EKGs.  No acute ischemic changes.  RADIOLOGY  DX-CHEST-PORTABLE (1 VIEW)   Final Result      1.  Cardiomegaly with interstitial prominence.      2.  Minimal right pleural effusion.            COURSE & MEDICAL DECISION MAKING  Pertinent Labs & Imaging studies reviewed. (See chart for details)    Differential diagnoses include but are not limited to: Upper GI bleed, symptomatic anemia, recurrent pleural effusion    8:37 AM - Medical " record reviewed, admitted earlier this year for GI bleed, seen several times after this for dyspnea.  Elective thoracentesis done in the emergency department on last visit.    9:03 AM-hemoglobin is 6.7, PPI ordered, GI paged.    9:13 AM-Case discussed with Dr. Matthews.  Nephrology, Dr. Slater paged.    10:14 AM-Dr. Taylor at bedside.  Hospitalist paged for admission.    10:20 AM According to Dr. Slater, the patient no longer sees [facility].    10:30 AM-Case discussed with Dr. Beatty ([facility] nephrology) he will evaluate the patient.       Decision Making:  This is a 80 y.o. year old male who presents with symptomatic anemia, hemoglobin of 6.8, the family reports was 8.1 last week.  He does note some generalized malaise.  High-sensitivity troponin is elevated in the 200s but compared to patient's prior labs this is actually significantly improved.  EKG does not show acute ischemic changes and the patient does not have chest pain.  I believe the elevated troponin is due to his prior cardiac history and end-stage renal disease and does not represent an acute myocardial event.    The patient is FOBT positive, not melanotic but dark stools, borderline tacky with soft blood pressures.  He will be given a light fluid bolus, PPI drip, GI consulted, plan to transfuse 1 unit upfront and likely will need an additional unit when he hits the floor.  Case discussed with nephrology, GI and the hospitalist.    CRITICAL CARE  The very real possibilty of a deterioration of this patient's condition required the highest level of my preparedness for sudden, emergent intervention.  I provided critical care services, which included medication orders, frequent reevaluations of the patient's condition and response to treatment, ordering and reviewing test results, and discussing the case with various consultants.  The critical care time associated with the care of the patient was 35 minutes. Review chart for interventions.  This time is exclusive of any other billable procedures.       FINAL IMPRESSION  1. Upper GI bleed    2. ESRD (end stage renal disease) (HCC)    3. Symptomatic anemia

## 2020-11-16 NOTE — DISCHARGE PLANNING
Outpatient Dialysis Note    Confirmed patient is active at:    [facility]  [address]  [place] [address]    Schedule: Monday, Wednesday, Friday   Time: 1:30pm     Patient is seen by Dr. Pisano in HD clinic.  Spoke with Wen at facility who confirmed.    Forwarded records for review.    Kate Mei- Dialysis Coordinator  [facility] # [phone]

## 2020-11-16 NOTE — ASSESSMENT & PLAN NOTE
With melena. GI consulted.   1 unit of PRBC given  Currently n.p.o. awaiting EGD this morning.  Continue IV Protonix, consider switching to Protonix twice daily.

## 2020-11-17 ENCOUNTER — ANESTHESIA EVENT (OUTPATIENT)
Dept: SURGERY | Facility: MEDICAL CENTER | Age: 80
DRG: 377 | End: 2020-11-17
Payer: MEDICARE

## 2020-11-17 ENCOUNTER — ANESTHESIA (OUTPATIENT)
Dept: SURGERY | Facility: MEDICAL CENTER | Age: 80
DRG: 377 | End: 2020-11-17
Payer: MEDICARE

## 2020-11-17 VITALS
RESPIRATION RATE: 17 BRPM | HEIGHT: 68 IN | OXYGEN SATURATION: 94 % | BODY MASS INDEX: 25.49 KG/M2 | WEIGHT: 168.21 LBS | DIASTOLIC BLOOD PRESSURE: 49 MMHG | SYSTOLIC BLOOD PRESSURE: 125 MMHG | HEART RATE: 85 BPM | TEMPERATURE: 98.4 F

## 2020-11-17 PROBLEM — K92.2 GIB (GASTROINTESTINAL BLEEDING): Status: RESOLVED | Noted: 2020-11-16 | Resolved: 2020-11-17

## 2020-11-17 LAB
ALBUMIN SERPL BCP-MCNC: 3.3 G/DL (ref 3.2–4.9)
ALBUMIN/GLOB SERPL: 1 G/DL
ALP SERPL-CCNC: 126 U/L (ref 30–99)
ALT SERPL-CCNC: 13 U/L (ref 2–50)
ANION GAP SERPL CALC-SCNC: 15 MMOL/L (ref 7–16)
AST SERPL-CCNC: 28 U/L (ref 12–45)
BILIRUB SERPL-MCNC: 0.4 MG/DL (ref 0.1–1.5)
BUN SERPL-MCNC: 31 MG/DL (ref 8–22)
CALCIUM SERPL-MCNC: 9.7 MG/DL (ref 8.4–10.2)
CHLORIDE SERPL-SCNC: 95 MMOL/L (ref 96–112)
CO2 SERPL-SCNC: 27 MMOL/L (ref 20–33)
CREAT SERPL-MCNC: 5.25 MG/DL (ref 0.5–1.4)
GLOBULIN SER CALC-MCNC: 3.3 G/DL (ref 1.9–3.5)
GLUCOSE SERPL-MCNC: 89 MG/DL (ref 65–99)
HGB BLD-MCNC: 7.6 G/DL (ref 14–18)
HGB BLD-MCNC: 7.8 G/DL (ref 14–18)
PATHOLOGY CONSULT NOTE: NORMAL
POTASSIUM SERPL-SCNC: 4.2 MMOL/L (ref 3.6–5.5)
PROT SERPL-MCNC: 6.6 G/DL (ref 6–8.2)
SARS-COV-2 RNA RESP QL NAA+PROBE: NOTDETECTED
SODIUM SERPL-SCNC: 137 MMOL/L (ref 135–145)
SPECIMEN SOURCE: NORMAL

## 2020-11-17 PROCEDURE — 88305 TISSUE EXAM BY PATHOLOGIST: CPT

## 2020-11-17 PROCEDURE — 700101 HCHG RX REV CODE 250: Performed by: ANESTHESIOLOGY

## 2020-11-17 PROCEDURE — 80053 COMPREHEN METABOLIC PANEL: CPT

## 2020-11-17 PROCEDURE — 85018 HEMOGLOBIN: CPT

## 2020-11-17 PROCEDURE — 0DB68ZX EXCISION OF STOMACH, VIA NATURAL OR ARTIFICIAL OPENING ENDOSCOPIC, DIAGNOSTIC: ICD-10-PCS | Performed by: INTERNAL MEDICINE

## 2020-11-17 PROCEDURE — 160002 HCHG RECOVERY MINUTES (STAT): Performed by: INTERNAL MEDICINE

## 2020-11-17 PROCEDURE — 700111 HCHG RX REV CODE 636 W/ 250 OVERRIDE (IP): Performed by: ANESTHESIOLOGY

## 2020-11-17 PROCEDURE — 160048 HCHG OR STATISTICAL LEVEL 1-5: Performed by: INTERNAL MEDICINE

## 2020-11-17 PROCEDURE — 500066 HCHG BITE BLOCK, ECT: Performed by: INTERNAL MEDICINE

## 2020-11-17 PROCEDURE — 0W3P8ZZ CONTROL BLEEDING IN GASTROINTESTINAL TRACT, VIA NATURAL OR ARTIFICIAL OPENING ENDOSCOPIC: ICD-10-PCS | Performed by: INTERNAL MEDICINE

## 2020-11-17 PROCEDURE — A9270 NON-COVERED ITEM OR SERVICE: HCPCS | Performed by: HOSPITALIST

## 2020-11-17 PROCEDURE — 700105 HCHG RX REV CODE 258: Performed by: ANESTHESIOLOGY

## 2020-11-17 PROCEDURE — 700102 HCHG RX REV CODE 250 W/ 637 OVERRIDE(OP): Performed by: HOSPITALIST

## 2020-11-17 PROCEDURE — 88312 SPECIAL STAINS GROUP 1: CPT

## 2020-11-17 PROCEDURE — 94760 N-INVAS EAR/PLS OXIMETRY 1: CPT

## 2020-11-17 PROCEDURE — 99239 HOSP IP/OBS DSCHRG MGMT >30: CPT | Performed by: STUDENT IN AN ORGANIZED HEALTH CARE EDUCATION/TRAINING PROGRAM

## 2020-11-17 PROCEDURE — 503369 HCHG GOLDPROBE, 7 FR: Performed by: INTERNAL MEDICINE

## 2020-11-17 PROCEDURE — 5A1D70Z PERFORMANCE OF URINARY FILTRATION, INTERMITTENT, LESS THAN 6 HOURS PER DAY: ICD-10-PCS | Performed by: INTERNAL MEDICINE

## 2020-11-17 PROCEDURE — 160202 HCHG ENDO MINUTES - 1ST 30 MINS LEVEL 3: Performed by: INTERNAL MEDICINE

## 2020-11-17 PROCEDURE — 160035 HCHG PACU - 1ST 60 MINS PHASE I: Performed by: INTERNAL MEDICINE

## 2020-11-17 PROCEDURE — 160009 HCHG ANES TIME/MIN: Performed by: INTERNAL MEDICINE

## 2020-11-17 RX ORDER — LIDOCAINE HYDROCHLORIDE 40 MG/ML
SOLUTION TOPICAL PRN
Status: DISCONTINUED | OUTPATIENT
Start: 2020-11-17 | End: 2020-11-17 | Stop reason: SURG

## 2020-11-17 RX ORDER — SUCCINYLCHOLINE/SOD CL,ISO/PF 200MG/10ML
SYRINGE (ML) INTRAVENOUS PRN
Status: DISCONTINUED | OUTPATIENT
Start: 2020-11-17 | End: 2020-11-17 | Stop reason: SURG

## 2020-11-17 RX ORDER — IPRATROPIUM BROMIDE AND ALBUTEROL SULFATE 2.5; .5 MG/3ML; MG/3ML
3 SOLUTION RESPIRATORY (INHALATION)
Status: DISCONTINUED | OUTPATIENT
Start: 2020-11-17 | End: 2020-11-17 | Stop reason: HOSPADM

## 2020-11-17 RX ORDER — HALOPERIDOL 5 MG/ML
1 INJECTION INTRAMUSCULAR
Status: DISCONTINUED | OUTPATIENT
Start: 2020-11-17 | End: 2020-11-17 | Stop reason: HOSPADM

## 2020-11-17 RX ORDER — SODIUM CHLORIDE, SODIUM LACTATE, POTASSIUM CHLORIDE, CALCIUM CHLORIDE 600; 310; 30; 20 MG/100ML; MG/100ML; MG/100ML; MG/100ML
INJECTION, SOLUTION INTRAVENOUS CONTINUOUS
Status: DISCONTINUED | OUTPATIENT
Start: 2020-11-17 | End: 2020-11-17 | Stop reason: HOSPADM

## 2020-11-17 RX ORDER — LIDOCAINE HYDROCHLORIDE 20 MG/ML
INJECTION, SOLUTION EPIDURAL; INFILTRATION; INTRACAUDAL; PERINEURAL PRN
Status: DISCONTINUED | OUTPATIENT
Start: 2020-11-17 | End: 2020-11-17 | Stop reason: SURG

## 2020-11-17 RX ORDER — ONDANSETRON 2 MG/ML
INJECTION INTRAMUSCULAR; INTRAVENOUS PRN
Status: DISCONTINUED | OUTPATIENT
Start: 2020-11-17 | End: 2020-11-17 | Stop reason: SURG

## 2020-11-17 RX ORDER — DIPHENHYDRAMINE HYDROCHLORIDE 50 MG/ML
12.5 INJECTION INTRAMUSCULAR; INTRAVENOUS
Status: DISCONTINUED | OUTPATIENT
Start: 2020-11-17 | End: 2020-11-17 | Stop reason: HOSPADM

## 2020-11-17 RX ORDER — PHENYLEPHRINE HCL IN 0.9% NACL 0.5 MG/5ML
SYRINGE (ML) INTRAVENOUS PRN
Status: DISCONTINUED | OUTPATIENT
Start: 2020-11-17 | End: 2020-11-17 | Stop reason: SURG

## 2020-11-17 RX ORDER — SODIUM CHLORIDE, SODIUM LACTATE, POTASSIUM CHLORIDE, CALCIUM CHLORIDE 600; 310; 30; 20 MG/100ML; MG/100ML; MG/100ML; MG/100ML
INJECTION, SOLUTION INTRAVENOUS
Status: DISCONTINUED | OUTPATIENT
Start: 2020-11-17 | End: 2020-11-17 | Stop reason: SURG

## 2020-11-17 RX ORDER — ONDANSETRON 2 MG/ML
4 INJECTION INTRAMUSCULAR; INTRAVENOUS
Status: DISCONTINUED | OUTPATIENT
Start: 2020-11-17 | End: 2020-11-17 | Stop reason: HOSPADM

## 2020-11-17 RX ORDER — HYDRALAZINE HYDROCHLORIDE 20 MG/ML
5 INJECTION INTRAMUSCULAR; INTRAVENOUS
Status: DISCONTINUED | OUTPATIENT
Start: 2020-11-17 | End: 2020-11-17 | Stop reason: HOSPADM

## 2020-11-17 RX ADMIN — Medication 200 MCG: at 09:31

## 2020-11-17 RX ADMIN — Medication 100 MG: at 09:20

## 2020-11-17 RX ADMIN — FLUTICASONE PROPIONATE 50 MCG: 50 SPRAY, METERED NASAL at 06:10

## 2020-11-17 RX ADMIN — FENTANYL CITRATE 100 MCG: 50 INJECTION, SOLUTION INTRAMUSCULAR; INTRAVENOUS at 09:20

## 2020-11-17 RX ADMIN — Medication 200 MCG: at 09:28

## 2020-11-17 RX ADMIN — Medication 100 MCG: at 09:33

## 2020-11-17 RX ADMIN — Medication 2001 MG: at 11:31

## 2020-11-17 RX ADMIN — Medication 100 MCG: at 09:47

## 2020-11-17 RX ADMIN — Medication 100 MCG: at 09:50

## 2020-11-17 RX ADMIN — Medication 200 MCG: at 09:25

## 2020-11-17 RX ADMIN — LIDOCAINE HYDROCHLORIDE 60 MG: 20 INJECTION, SOLUTION EPIDURAL; INFILTRATION; INTRACAUDAL; PERINEURAL at 09:20

## 2020-11-17 RX ADMIN — SODIUM CHLORIDE, POTASSIUM CHLORIDE, SODIUM LACTATE AND CALCIUM CHLORIDE: 600; 310; 30; 20 INJECTION, SOLUTION INTRAVENOUS at 09:17

## 2020-11-17 RX ADMIN — ONDANSETRON 4 MG: 2 INJECTION INTRAMUSCULAR; INTRAVENOUS at 09:33

## 2020-11-17 RX ADMIN — PROPOFOL 100 MG: 10 INJECTION, EMULSION INTRAVENOUS at 09:20

## 2020-11-17 RX ADMIN — LIDOCAINE HYDROCHLORIDE 4 ML: 40 SOLUTION TOPICAL at 09:22

## 2020-11-17 ASSESSMENT — ENCOUNTER SYMPTOMS
ABDOMINAL PAIN: 0
PALPITATIONS: 0
VOMITING: 0
FEVER: 0
SHORTNESS OF BREATH: 0
DIARRHEA: 0
NAUSEA: 0
MYALGIAS: 0
CHILLS: 0
WHEEZING: 0
CONSTIPATION: 1

## 2020-11-17 ASSESSMENT — PAIN DESCRIPTION - PAIN TYPE: TYPE: ACUTE PAIN

## 2020-11-17 ASSESSMENT — PAIN SCALES - GENERAL: PAIN_LEVEL: 0

## 2020-11-17 NOTE — PROGRESS NOTES
Hd treatment ordered by Dr Beatty started at 1630 and ended at 2000 with net uf of 2 Liters without problem. Cannulated right fore arm AVF x 2 using 15 gauge needles without problem. See flow sheet for details.

## 2020-11-17 NOTE — PROGRESS NOTES
Patient has sores on right ankle and right calf. Gauze with paper tape used to cover these sores for now. Order is in for wound consult.

## 2020-11-17 NOTE — PROGRESS NOTES
Received shift handoff report from ROCHELLE Castillo. Pt is in bed and stable. Bed locked and in lowest position, upper side rails up, call light in reach, whiteboard updated. POC discussed and pt verbalizes understanding. Will continue to monitor.

## 2020-11-17 NOTE — PROGRESS NOTES
[facility]phrology Daily Progress Note    Date of Service  11/17/2020    History of Present Illness  79 yo M PMH ESRD MWF iHD, HTN, anemia of CKD, and CKD-MBD who presents to ED with CC as above.  He has been having progressively worsening weakness and light-headedness and was getting concerned because last time this happened he was found to be anemic due to GI bleed.  During that visit a thorough work up was unrevealing for source of bleed.  At the HD unit they have been tracking his Hgb and it has slowly been dropping, but was never critically low.  Last Hmg 8.1 at [facility] HD clinic.  In ED he was found to be at 6.7 with BUN 78.  He also had black stools this AM which has not been the case otherwise.  He was given 1 unit pRBC and he already feels much better.  GI evaluated him and planned for endoscopy tomorrow.  He is admitted for further observation.  Otherwise denies any other symptoms.  Wife was at bedside and she states that prior to this his Hgb was almost at 11 and he had never looked or felt better.  He was helping her with housework and very active.    Interval Events   11/17:  Tolerated HD with 2L UF.  Taken to OR with anesthesia for EGD this am.     Review of Systems  ROS   Deferred patient in procedure    Physical Exam  Temp:  [36 °C (96.8 °F)-37.1 °C (98.7 °F)] 36.8 °C (98.3 °F)  Pulse:  [] 62  Resp:  [15-27] 16  BP: (110-141)/(38-86) 131/58  SpO2:  [93 %-100 %] 99 %    Physical Exam   Deferred patient in procedure    Fluids    Intake/Output Summary (Last 24 hours) at 11/17/2020 0923  Last data filed at 11/16/2020 2035  Gross per 24 hour   Intake 850 ml   Output 2500 ml   Net -1650 ml       Laboratory  Recent Labs     11/16/20  0850 11/16/20 2148 11/17/20  0634   WBC 5.1  --   --    RBC 2.15*  --   --    HEMOGLOBIN 6.7* 7.9* 7.8*   HEMATOCRIT 22.2*  --   --    .3*  --   --    MCH 31.2  --   --    MCHC 30.2*  --   --    RDW 57.5*  --   --    PLATELETCT 205  --   --    MPV 10.2  --   --       Recent Labs     11/16/20  0850 11/17/20  0634   SODIUM 136 137   POTASSIUM 4.8 4.2   CHLORIDE 92* 95*   CO2 22 27   GLUCOSE 114* 89   BUN 78* 31*   CREATININE 9.83* 5.25*   CALCIUM 10.2 9.7     Recent Labs     11/16/20  0850   INR 1.03     No results for input(s): NTPROBNP in the last 72 hours.        Imaging  DX-CHEST-PORTABLE (1 VIEW)   Final Result       1.  Cardiomegaly with interstitial prominence.       2.  Minimal right pleural effusion.             IMPRESSION:  - ESRD    * Etiology likely 2/2 HTN    * MWF iHD at [facility]  - GI Bleed    * Source unknown    * S/P pRBC transfusion    * Continue PPI per GI  - HTN    * Goal BP < 140/90    * Stable  - Anemia of CKD    * Goal Hgb 10-11    * Stable  - CKD-MBD    * Managed at HD unit  - Chronic systolic HF  - HLD  - CAD     PLAN:  - MWF iHD during stay  - UF as tolerated  - No dietary protein restrictions  - Dose all meds per ESRD  - FU GI rec's  - Transfuse as needed to maintain Hgb > 7

## 2020-11-17 NOTE — ANESTHESIA PROCEDURE NOTES
Airway    Date/Time: 11/17/2020 9:22 AM  Performed by: Evan Green M.D.  Authorized by: Evan Green M.D.     Location:  OR  Urgency:  Elective  Difficult Airway: No    Indications for Airway Management:  Anesthesia      Spontaneous Ventilation: absent    Sedation Level:  Deep  Preoxygenated: Yes    Patient Position:  Sniffing  Mask Difficulty Assessment:  1 - vent by mask  Final Airway Type:  Endotracheal airway  Final Endotracheal Airway:  ETT  Cuffed: Yes    Technique Used for Successful ETT Placement:  Direct laryngoscopy    Insertion Site:  Oral  Blade Type:  Ortega  Laryngoscope Blade/Videolaryngoscope Blade Size:  2  ETT Size (mm):  7.0  Measured from:  Lips  ETT to Lips (cm):  23  Placement Verified by: auscultation and capnometry    Cormack-Lehane Classification:  Grade I - full view of glottis  Number of Attempts at Approach:  1

## 2020-11-17 NOTE — OP REPORT
DATE OF SERVICE:  11/17/2020     INDICATION FOR PROCEDURE:  melena    PROCEDURE PERFORMED: EGD with biopsy and cautery to control bleeding     CONSENT:  Informed consent was obtained directly from the patient after   benefits, risks and possible alternatives were discussed.     MEDICATIONS:  The patient received general anesthesia for this procedure. Please see the anesthesia record for details     PROCEDURE DESCRIPTION:  The patient was placed in the left lateral decubitus position after sedation and intubation by the anesthesiologist.  When ready, the upper endoscope was placed in the patient's mouth and advanced easily and carefully to the esophagus and subsequently to the stomach and duodenum.The scope was slowly withdrawn and mucosa carefully examined. Retroflexion was performed in the stomach. The patients toleration of this procedure was excellent     FINDINGS:    Esophagus: normal    Stomach: 2 small AVMs were seen in the gastric body and these were treated with bipolar cautery. There was abnormal mucosa in the fundus. The mucosa was white and granular appearing suggestive of atypical gastritis. Biopsies were taken from this area    Duodenum: normal         COMPLICATIONS:  No complications or blood loss during or in the immediate postoperative period.     IMPRESSION:  1.  gastric AVMs treated  2.  gastritis     RECOMMENDATION:    1. F/u biopsies  2. F/u with [facility] as outpatient.   3. Daily PPI  4. Restart renal diet    Ok to discharge home from GI perspective. Please call with new questions. We will sign off.

## 2020-11-17 NOTE — ASSESSMENT & PLAN NOTE
Patient with severe aortic stenosis.  With the melena if a bleeding vessels not found may consider AVM due to his aortic stenosis.

## 2020-11-17 NOTE — ANESTHESIA TIME REPORT
Anesthesia Start and Stop Event Times     Date Time Event    11/17/2020 0838 Ready for Procedure     0917 Anesthesia Start     0944 Anesthesia Stop        Responsible Staff  11/17/20    Name Role Begin End    Evan Green M.D. Anesth 0917 0944        Preop Diagnosis (Free Text):  Pre-op Diagnosis     GI bleed        Preop Diagnosis (Codes):  Diagnosis Information     Diagnosis Code(s): Gastric AVM [K31.819]        Post op Diagnosis  Gastric AVM      Premium Reason  Non-Premium    Comments:

## 2020-11-17 NOTE — PROGRESS NOTES
Pt arrived to unit via gurney. Ambulated from gurney to bed, standby assist. Tele monitor applied, vitals taken. Pt assessed. A&Ox4. Admit profile and med rec completed. Discussed POC with pt, including protonix drip and EGD tomorrow. Welcome folder provided and discussed. Communication board filled out. Questions and concerns addressed, verbalized understanding. Fall precautions in place. Pt demonstrates ability to use call light appropriately. Pt left in lowest position. Bed locked and lowest position, bed alarm on.

## 2020-11-17 NOTE — OR NURSING
0941:  To PACU from OR via gurcindy,  sleeping, respirations spontaneous and non-labored via OPA.     0948:  Peter at bedside.  Assessed pt and BP.  Gave phenylephrine and stimulated pt.  Pt woke, OPA DC'd.      0956:  Pt continues to take off his O2 mask.  VSS.     1010:  No change.  Pt resting with eyes closed.  Pt refuses water.    1012:  Dr. Matthews at bedside    1040: Pt meets criteria to transfer to floor.

## 2020-11-17 NOTE — PROGRESS NOTES
[facility] Medicine Daily Progress Note    Date of Service  11/17/2020    Chief Complaint  80 y.o. male admitted 11/16/2020 with melena.    Hospital Course  80 y.o. male who presented 11/16/2020 with pmh of esrd, cad, pad, is coming today c/o weakness, lightheadedness, patient stated that his hb is low because his symptoms last time checked was 8.1, patient is been having melena but not hematochezia, hemoptysis or hematemesis, patient denies focal weakness, no fever chills, n/v/d/c no abdominal pain, no ill contact no recent travel, patient in the ER was found to have hb 6.7, his bun is 78, patient is on HD on MWF, patient was evaluated by GI and will be getting one unit of prbcs' EGD in am since patient is hemodynamically stable and no active bleeding at this time, patient will get HD today nephro has been consulted, patient is alert and oriented follows commands he expressed understanding of his plan of care and agreed with it, all questions answered.     Interval Problem Update  Patient denies any further episodes of melena since yesterday.  He is reporting he has not had any more bowel movements and feels constipated.  He is currently n.p.o. awaiting EGD this morning.  He is denying nausea, vomiting, chest pain, shortness of breath.  The elevated troponin was noted in the labs yesterday however he has no symptoms and his EKG appears unchanged from the prior one from a couple months ago.  Patient is anxious to go home and the timeline of his discharge has been deferred to GI pending their findings.  Repeat hemoglobin is 7.8.  T-max 98.7, /58.    Consultants/Specialty  GI Dr. Matthews  Nephrology Dr. Beatty    Code Status  Full Code    Disposition  Awaiting EGD.  Anticipate discharge home in 1 to 2 days.    Review of Systems  Review of Systems   Constitutional: Negative for chills and fever.   Respiratory: Negative for shortness of breath and wheezing.    Cardiovascular: Negative for chest pain, palpitations and  leg swelling.   Gastrointestinal: Positive for constipation and melena. Negative for abdominal pain, diarrhea, nausea and vomiting.   Genitourinary: Negative for hematuria.   Musculoskeletal: Negative for myalgias.   All other systems reviewed and are negative.       Physical Exam  Temp:  [36 °C (96.8 °F)-37.1 °C (98.7 °F)] 36.8 °C (98.3 °F)  Pulse:  [] 62  Resp:  [15-27] 16  BP: (107-141)/(38-86) 131/58  SpO2:  [93 %-100 %] 99 %    Physical Exam  Constitutional:       General: He is not in acute distress.     Appearance: Normal appearance. He is not toxic-appearing or diaphoretic.   Eyes:      General: No scleral icterus.        Right eye: No discharge.         Left eye: No discharge.      Conjunctiva/sclera: Conjunctivae normal.   Cardiovascular:      Rate and Rhythm: Normal rate and regular rhythm.      Heart sounds: Murmur present. Systolic murmur present with a grade of 3/6.   Pulmonary:      Effort: Pulmonary effort is normal. No respiratory distress.      Breath sounds: Normal breath sounds. No wheezing.   Abdominal:      General: Bowel sounds are normal. There is no distension.      Palpations: Abdomen is soft.      Tenderness: There is no abdominal tenderness. There is no right CVA tenderness, left CVA tenderness or guarding.   Musculoskeletal:         General: No swelling or tenderness.   Skin:     General: Skin is warm and dry.      Coloration: Skin is not jaundiced.   Neurological:      Mental Status: He is alert and oriented to person, place, and time.      Cranial Nerves: No cranial nerve deficit.      Motor: No weakness.   Psychiatric:         Mood and Affect: Mood normal.         Thought Content: Thought content normal.         Judgment: Judgment normal.         Fluids    Intake/Output Summary (Last 24 hours) at 11/17/2020 0852  Last data filed at 11/16/2020 2035  Gross per 24 hour   Intake 850 ml   Output 2500 ml   Net -1650 ml       Laboratory  Recent Labs     11/16/20  0850 11/16/20  5307  11/17/20  0634   WBC 5.1  --   --    RBC 2.15*  --   --    HEMOGLOBIN 6.7* 7.9* 7.8*   HEMATOCRIT 22.2*  --   --    .3*  --   --    MCH 31.2  --   --    MCHC 30.2*  --   --    RDW 57.5*  --   --    PLATELETCT 205  --   --    MPV 10.2  --   --      Recent Labs     11/16/20  0850 11/17/20  0634   SODIUM 136 137   POTASSIUM 4.8 4.2   CHLORIDE 92* 95*   CO2 22 27   GLUCOSE 114* 89   BUN 78* 31*   CREATININE 9.83* 5.25*   CALCIUM 10.2 9.7     Recent Labs     11/16/20  0850   INR 1.03               Imaging  DX-CHEST-PORTABLE (1 VIEW)   Final Result      1.  Cardiomegaly with interstitial prominence.      2.  Minimal right pleural effusion.           Assessment/Plan  * GIB (gastrointestinal bleeding)  Assessment & Plan  With melena. GI consulted.   1 unit of PRBC given  Currently n.p.o. awaiting EGD this morning.  Continue IV Protonix, consider switching to Protonix twice daily.    Anemia  Assessment & Plan  Acute on chronic due to blood lost GIB, requiring one unit of prbcs.   Will check iron, ferritin, retic count, vit b12 folate.     Elevated troponin  Assessment & Plan  Patient with a elevated troponin.  He is adamant that he is not having any chest pain.  EKG unchanged from his prior.  The elevated troponin is likely secondary to his anemia.    Aortic stenosis  Assessment & Plan  Patient with severe aortic stenosis.  With the melena if a bleeding vessels not found may consider AVM due to his aortic stenosis.    ESRD (end stage renal disease) on dialysis (HCC)- (present on admission)  Assessment & Plan  HD MWF, follows with [facility] nephrology  nephro consulted  HD yesterday     PAD (peripheral artery disease) ([facility])- (present on admission)  Assessment & Plan  Chronic needs to f/u with Dr Peters as outpatient  Wound care consulted for non healing small ulcer in right ankle    Coronary artery disease- (present on admission)  Assessment & Plan  Stable no chest pain.   He is not on ASA or plavix anymore.    Continue monitoring.     BPH (benign prostatic hypertrophy)- (present on admission)  Assessment & Plan  Continue flomax.        VTE prophylaxis: SCDs

## 2020-11-17 NOTE — ASSESSMENT & PLAN NOTE
Patient with a elevated troponin.  He is adamant that he is not having any chest pain.  EKG unchanged from his prior.  The elevated troponin is likely secondary to his anemia.

## 2020-11-17 NOTE — ANESTHESIA PREPROCEDURE EVALUATION
Admitted with GI bleeding, Hb 7.8 currently. ESRD on dialysis (potassium 4.2). Hx of CAD and afib. TTE from 3/2020 reviewed: preserved LVEF with diastolic dysfunction and moderate to severe aortic stenosis.     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[facility])   (+) AVM (arteriovenous malformation)   (+) Coronary artery disease   (+) Ischemic necrosis of foot (HCC)   (+) PAD (peripheral artery disease) ([facility])   (+) Severe aortic stenosis         (+) ESRD (end stage renal disease) on dialysis ([facility])   (+) Renal cyst      Other   (+) Uric acid arthropathy       Physical Exam    Airway   Mallampati: II  TM distance: >3 FB  Neck ROM: full       Cardiovascular - normal exam  Rhythm: regular  Rate: normal  (-) murmur     Dental   Comments: Edentulous bottom, poor/missing teeth on top    Very poor dentition   Pulmonary - normal exam  Breath sounds clear to auscultation     Abdominal    Neurological - normal exam               Anesthesia Plan    ASA 3   ASA physical status 3 criteria: CAD/stents (> 3 months)    Plan - general       Airway plan will be ETT        Induction: intravenous    Postoperative Plan: Postoperative administration of opioids is intended.    Pertinent diagnostic labs and testing reviewed    Informed Consent:    Anesthetic plan and risks discussed with patient.    Use of blood products discussed with: patient whom consented to blood products.

## 2020-11-17 NOTE — ANESTHESIA POSTPROCEDURE EVALUATION
Patient: Parmjit Castelan    Procedure Summary     Date: 11/17/20 Room / Location:  ENDOSCOPIC ULTRASOUND ROOM / SURGERY [facility]    Anesthesia Start: 0917 Anesthesia Stop: 0944    Procedure: GASTROSCOPY Diagnosis:       Gastric AVM      (Gastric AVM [360373])    Surgeons: Juan Matthews M.D. Responsible Provider: Evan Green M.D.    Anesthesia Type: general ASA Status: 3          Final Anesthesia Type: general  Last vitals  BP   Blood Pressure : 125/62, NIBP: (!) 95/48    Temp   36.4 °C (97.6 °F)    Pulse   Pulse: 77   Resp   16    SpO2   97 %      Anesthesia Post Evaluation    Patient location during evaluation: PACU  Patient participation: complete - patient participated  Level of consciousness: awake and alert  Pain score: 0    Airway patency: patent  Anesthetic complications: no  Cardiovascular status: hemodynamically stable  Respiratory status: acceptable  Hydration status: euvolemic    PONV: none           Nurse Pain Score: 0 (NPRS)

## 2020-11-18 NOTE — DISCHARGE SUMMARY
Discharge Summary    CHIEF COMPLAINT ON ADMISSION  Chief Complaint   Patient presents with   • Abnormal Labs     Pt reports has been told Hemoglobin is low and may need a transfusion       Reason for Admission  low hemoglobin      Admission Date  11/16/2020    CODE STATUS  Full Code    HPI & HOSPITAL COURSE  80 y.o. male who presented 11/16/2020 with pmh of esrd, cad, pad, is coming today c/o weakness, lightheadedness, patient stated that his hb is low because his symptoms last time checked was 8.1, patient is been having melena but not hematochezia, hemoptysis or hematemesis, patient denies focal weakness, no fever chills, n/v/d/c no abdominal pain, no ill contact no recent travel, patient in the ER was found to have hb 6.7, his bun is 78, patient is on HD on MWF, patient was evaluated by GI and will be getting one unit of prbcs' EGD in am since patient is hemodynamically stable and no active bleeding at this time, patient will get HD today nephro has been consulted, patient is alert and oriented follows commands he expressed understanding of his plan of care and agreed with it, all questions answered.     1 unit of packed red blood cells was given which brought the hemoglobin to 7.8.  EGD was performed on 11/17 which showed gastric AVMs and gastritis biopsies were done.  Recommendation for daily PPI restarting renal diet.  Patient was cleared for discharge by GI.  Repeat hemoglobin was 7.6 showing a stable hemoglobin.  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    Therefore, he is discharged in good and stable condition to home with close outpatient follow-up.    The patient recovered much more quickly than anticipated on admission.    Discharge Date  11/17/2020    FOLLOW UP ITEMS POST DISCHARGE  GI bleed  ESRD    DISCHARGE DIAGNOSES  Principal Problem (Resolved):    GIB (gastrointestinal bleeding) POA: Unknown  Active  Problems:    Coronary artery disease POA: Yes      Overview: Coronary calcium was noted on chest CT scan again in 2010. Myocardial       perfusion imaging in 2011 showed no evidence of ischemia or infarction    PAD (peripheral artery disease) ([facility]) POA: Yes    ESRD (end stage renal disease) on dialysis ([facility]) POA: Yes    Aortic stenosis POA: Unknown    Elevated troponin POA: Unknown    Anemia POA: Unknown    BPH (benign prostatic hypertrophy) POA: Yes      Overview: ICD-10 transition      FOLLOW UP  Future Appointments   Date Time Provider Department Center   4/20/2021  2:00 PM ROLDAN Talavera M.D.  [address]  [address]  [facility] [id number] [phone]    Schedule an appointment as soon as possible for a visit in 1 week      [facility]  [address] [place] [id number] [phone]          MEDICATIONS ON DISCHARGE     Medication List      CHANGE how you take these medications      Instructions   torsemide 10 MG tablet  What changed:   · when to take this  · additional instructions  Commonly known as: DEMADEX   Doctor's comments: Do fill until patient calls please  Take 3 Tabs by mouth every day.  Dose: 30 mg        CONTINUE taking these medications      Instructions   albuterol 108 (90 Base) MCG/ACT Aers inhalation aerosol   Doctor's comments: Do fill until patient calls please  Inhale 2 Puffs by mouth every four hours as needed for Shortness of Breath.  Dose: 2 Puff     calcium acetate 667 MG Caps  Commonly known as: PHOS-LO   Take 1,334-2,001 mg by mouth See Admin Instructions. Pt reports that he takes 2 cap for each snack and 3 cap 3 times a day with meals  Dose: 1,334-2,001 mg     DIALYVITE PO   Take 1 Tab by mouth every evening.  Dose: 1 Tab     fluticasone 50 MCG/ACT nasal spray  Commonly known as: FLONASE   Doctor's comments: Do fill until patient calls please  SPRAY ONE TO TWO SPRAYS IN EACH NOSTRIL ONCE DAILY *FLONASE*      montelukast 10 MG Tabs  Commonly known as: SINGULAIR   Doctor's comments: Do fill until patient calls please  Take 1 Tab by mouth every day.  Dose: 10 mg     omeprazole 40 MG delayed-release capsule  Commonly known as: PRILOSEC   Doctor's comments: Do fill until patient calls please  Take 1 Cap by mouth every day.  Dose: 40 mg     ROPINIRole 0.5 MG Tabs  Commonly known as: REQUIP   Doctor's comments: Do fill until patient calls please  Take 2 Tabs by mouth every bedtime.  Dose: 1 mg     rosuvastatin 40 MG tablet  Commonly known as: CRESTOR   Doctor's comments: Do fill until patient calls please  Take 1 Tab by mouth every evening.  Dose: 40 mg     tamsulosin 0.4 MG capsule  Commonly known as: FLOMAX   Doctor's comments: Do fill until patient calls please  Take 2 Caps by mouth every evening.  Dose: 0.8 mg     traZODone 150 MG Tabs  Commonly known as: DESYREL   Take 1 Tab by mouth See Admin Instructions. Pt takes 150MG @ 2130 and another 150MG @ 0130  Dose: 150 mg        STOP taking these medications    amoxicillin 500 MG Caps  Commonly known as: AMOXIL            Allergies  No Known Allergies    DIET  Orders Placed This Encounter   Procedures   • Diet Order Diet: Renal     Standing Status:   Standing     Number of Occurrences:   1     Order Specific Question:   Diet:     Answer:   Renal [8]       ACTIVITY  As tolerated.  Weight bearing as tolerated    CONSULTATIONS  GI Dr Matthews    PROCEDURES  11/17/2020 EGD    LABORATORY  Lab Results   Component Value Date    SODIUM 137 11/17/2020    POTASSIUM 4.2 11/17/2020    CHLORIDE 95 (L) 11/17/2020    CO2 27 11/17/2020    GLUCOSE 89 11/17/2020    BUN 31 (H) 11/17/2020    CREATININE 5.25 (HH) 11/17/2020    CREATININE 2.1 (H) 05/06/2009        Lab Results   Component Value Date    WBC 5.1 11/16/2020    HEMOGLOBIN 7.6 (L) 11/17/2020    HEMATOCRIT 22.2 (L) 11/16/2020    PLATELETCT 205 11/16/2020      Instructions  The patient was instructed to return to the ER in the event of  worsening symptoms. I have counseled the patient on the importance of compliance and the patient has agreed to proceed with all medical recommendations and follow up plan indicated above.   The patient understands that all medications come with benefits and risks. Risks may include permanent injury or death and these risks can be minimized with close reassessment and monitoring.    Total time of the discharge process exceeds 34 minutes.

## 2020-11-18 NOTE — DISCHARGE INSTRUCTIONS
Discharge Instructions    Discharged to home by car with relative. Discharged via wheelchair, hospital escort: Yes.  Special equipment needed: Not Applicable    Be sure to schedule a follow-up appointment with your primary care doctor or any specialists as instructed.     Discharge Plan:   Influenza Vaccine Indication: Not indicated: Previously immunized this influenza season and > 8 years of age    I understand that a diet low in cholesterol, fat, and sodium is recommended for good health. Unless I have been given specific instructions below for another diet, I accept this instruction as my diet prescription.   Other diet: Renal Diet     Special Instructions:     Per MD follow up with GI outpatient       Gastrointestinal Bleeding  Gastrointestinal (GI) bleeding is bleeding somewhere along the digestive tract, between the mouth and the anus. This tract includes the mouth, esophagus, stomach, small intestine, large intestine, and anus. The large intestine is often called the colon.  GI bleeding can be caused by various problems. The severity of these problems can range from mild to serious or even life-threatening. If you have GI bleeding, you may find blood in your stools (feces), you may have black stools, or you may vomit blood. If there is a lot of bleeding, you may need to stay in the hospital.  What are the causes?  This condition may be caused by:  · Inflammation, irritation, or swelling of the esophagus (esophagitis). The esophagus is part of the body that moves food from your mouth to your stomach.  · Swollen veins in the rectum (hemorrhoids).  · Areas of painful tearing in the anus that are often caused by passing hard stool (anal fissures).  · Pouches that form on the colon over time, with age, and may bleed a lot (diverticulosis).  · Inflammation (diverticulitis) in areas with diverticulosis. This can cause pain, fever, and bloody stools, although bleeding may be mild.  · Growths (polyps) or cancer. Colon  cancer often starts out as precancerous polyps.  · Gastritis and ulcers. With these, bleeding may come from the upper GI tract, near the stomach.  What increases the risk?  You are more likely to develop this condition if you:  · Have an infection in your stomach from a type of bacteria called Helicobacter pylori.  · Take certain medicines, such as:  ? NSAIDs.  ? Aspirin.  ? Selective serotonin reuptake inhibitors (SSRIs).  ? Steroids.  ? Antiplatelet or anticoagulant medicines.  · Smoke.  · Drink alcohol.  What are the signs or symptoms?  Common symptoms of this condition include:  · Bright red blood in your vomit, or vomit that looks like coffee grounds.  · Bloody, black, or tarry stools.  ? Bleeding from the lower GI tract will usually cause red or maroon blood in the stools.  ? Bleeding from the upper GI tract may cause black, tarry stools that are often stronger smelling than usual.  ? In certain cases, if the bleeding is fast enough, the stools may be red.  · Pain or cramping in the abdomen.  How is this diagnosed?  This condition may be diagnosed based on:  · Your medical history and a physical exam.  · Various tests, such as:  ? Blood tests.  ? Stool tests.  ? X-rays and other imaging tests.  ? Esophagogastroduodenoscopy (EGD). In this test, a flexible, lighted tube is used to look at your esophagus, stomach, and small intestine.  ? Colonoscopy. In this test, a flexible, lighted tube is used to look at your colon.  How is this treated?  Treatment for this condition depends on the cause of the bleeding. For example:  · For bleeding from the esophagus, stomach, small intestine, or colon, the health care provider doing your EGD or colonoscopy may be able to stop the bleeding as part of the procedure.  · Inflammation or infection of the colon can be treated with medicines.  · Certain rectal problems can be treated with creams, suppositories, or warm baths.  · Medicines may be given to reduce acid in your  stomach.  · Surgery is sometimes needed.  · Blood transfusions are sometimes needed if a lot of blood has been lost.  If bleeding is mild, you may be allowed to go home. If there is a lot of bleeding, you will need to stay in the hospital for observation.  Follow these instructions at home:    · Take over-the-counter and prescription medicines only as told by your health care provider.  · Eat foods that are high in fiber, such as beans, whole grains, and fresh fruits and vegetables. This will help to keep your stools soft. Eating 1-3 prunes each day works well for many people.  · Drink enough fluid to keep your urine pale yellow.  · Keep all follow-up visits as told by your health care provider. This is important.  Contact a health care provider if:  · Your symptoms do not improve.  Get help right away if:  · Your bleeding does not stop.  · You feel light-headed or you faint.  · You feel weak.  · You have severe cramps in your back or abdomen.  · You pass large blood clots in your stool.  · Your symptoms are getting worse.  · You have chest pain or fast heartbeats.  Summary  · Gastrointestinal (GI) bleeding is bleeding somewhere along the digestive tract, between the mouth and anus. GI bleeding can be caused by various problems. The severity of these problems can range from mild to serious or even life-threatening.  · Treatment for this condition depends on the cause of the bleeding.  · Take over-the-counter and prescription medicines only as told by your health care provider.  · Keep all follow-up visits as told by your health care provider. This is important.  · Get help right away if your bleeding increases, your symptoms are getting worse, or you have new symptoms.  This information is not intended to replace advice given to you by your health care provider. Make sure you discuss any questions you have with your health care provider.  Document Released: 12/15/2001 Document Revised: 07/31/2019 Document Reviewed:  07/31/2019  Elsevier Patient Education © 2020 [employer]      · Is patient discharged on Warfarin / Coumadin?   No     Depression / Suicide Risk    As you are discharged from this [facility] facility, it is important to learn how to keep safe from harming yourself.    Recognize the warning signs:  · Abrupt changes in personality, positive or negative- including increase in energy   · Giving away possessions  · Change in eating patterns- significant weight changes-  positive or negative  · Change in sleeping patterns- unable to sleep or sleeping all the time   · Unwillingness or inability to communicate  · Depression  · Unusual sadness, discouragement and loneliness  · Talk of wanting to die  · Neglect of personal appearance   · Rebelliousness- reckless behavior  · Withdrawal from people/activities they love  · Confusion- inability to concentrate     If you or a loved one observes any of these behaviors or has concerns about self-harm, here's what you can do:  · Talk about it- your feelings and reasons for harming yourself  · Remove any means that you might use to hurt yourself (examples: pills, rope, extension cords, firearm)  · Get professional help from the community (Mental Health, Substance Abuse, psychological counseling)  · Do not be alone:Call your Safe Contact- someone whom you trust who will be there for you.  · Call your local CRISIS HOTLINE 258-5600 or 896-229-9000  · Call your local Children's Mobile Crisis Response Team [place] (515) 017-3600 or www.CME  · Call the toll free National Suicide Prevention Hotlines   · National Suicide Prevention Lifeline 764-870-TFPO (8184)  · National Hope Line Network 800-SUICIDE (705-3261)

## 2020-11-18 NOTE — PROGRESS NOTES
Received discharge orders. Removed IV. Went over discharge instructions with Ronny and wife. All questions answered. Patient feels ready to discharge. Patient escorted by  via transport. Patient went home with wife. All belongings gathered and patient dressed.

## 2020-11-23 ENCOUNTER — HOSPITAL ENCOUNTER (EMERGENCY)
Facility: MEDICAL CENTER | Age: 80
End: 2020-11-24
Attending: EMERGENCY MEDICINE
Payer: MEDICARE

## 2020-11-23 ENCOUNTER — APPOINTMENT (OUTPATIENT)
Dept: RADIOLOGY | Facility: MEDICAL CENTER | Age: 80
End: 2020-11-23
Attending: EMERGENCY MEDICINE
Payer: MEDICARE

## 2020-11-23 DIAGNOSIS — D63.1 ANEMIA DUE TO CHRONIC KIDNEY DISEASE, ON CHRONIC DIALYSIS (HCC): ICD-10-CM

## 2020-11-23 DIAGNOSIS — R53.83 OTHER FATIGUE: ICD-10-CM

## 2020-11-23 DIAGNOSIS — N18.6 ANEMIA DUE TO CHRONIC KIDNEY DISEASE, ON CHRONIC DIALYSIS (HCC): ICD-10-CM

## 2020-11-23 DIAGNOSIS — Z99.2 ANEMIA DUE TO CHRONIC KIDNEY DISEASE, ON CHRONIC DIALYSIS (HCC): ICD-10-CM

## 2020-11-23 LAB
ABO GROUP BLD: NORMAL
ALBUMIN SERPL BCP-MCNC: 3.9 G/DL (ref 3.2–4.9)
ALBUMIN/GLOB SERPL: 1.2 G/DL
ALP SERPL-CCNC: 138 U/L (ref 30–99)
ALT SERPL-CCNC: 13 U/L (ref 2–50)
ANION GAP SERPL CALC-SCNC: 16 MMOL/L (ref 7–16)
AST SERPL-CCNC: 23 U/L (ref 12–45)
BARCODED ABORH UBTYP: 5100
BARCODED PRD CODE UBPRD: NORMAL
BARCODED UNIT NUM UBUNT: NORMAL
BASOPHILS # BLD AUTO: 0.6 % (ref 0–1.8)
BASOPHILS # BLD: 0.02 K/UL (ref 0–0.12)
BILIRUB SERPL-MCNC: 0.6 MG/DL (ref 0.1–1.5)
BLD GP AB SCN SERPL QL: NORMAL
BUN SERPL-MCNC: 19 MG/DL (ref 8–22)
CALCIUM SERPL-MCNC: 10.1 MG/DL (ref 8.4–10.2)
CHLORIDE SERPL-SCNC: 90 MMOL/L (ref 96–112)
CO2 SERPL-SCNC: 32 MMOL/L (ref 20–33)
COMPONENT R 8504R: NORMAL
CREAT SERPL-MCNC: 4.3 MG/DL (ref 0.5–1.4)
EOSINOPHIL # BLD AUTO: 0.06 K/UL (ref 0–0.51)
EOSINOPHIL NFR BLD: 1.7 % (ref 0–6.9)
ERYTHROCYTE [DISTWIDTH] IN BLOOD BY AUTOMATED COUNT: 62 FL (ref 35.9–50)
GLOBULIN SER CALC-MCNC: 3.3 G/DL (ref 1.9–3.5)
GLUCOSE SERPL-MCNC: 102 MG/DL (ref 65–99)
HCT VFR BLD AUTO: 23.9 % (ref 42–52)
HGB BLD-MCNC: 7.2 G/DL (ref 14–18)
IMM GRANULOCYTES # BLD AUTO: 0.01 K/UL (ref 0–0.11)
IMM GRANULOCYTES NFR BLD AUTO: 0.3 % (ref 0–0.9)
LACTATE BLD-SCNC: 2.4 MMOL/L (ref 0.5–2)
LYMPHOCYTES # BLD AUTO: 0.31 K/UL (ref 1–4.8)
LYMPHOCYTES NFR BLD: 8.9 % (ref 22–41)
MCH RBC QN AUTO: 31.3 PG (ref 27–33)
MCHC RBC AUTO-ENTMCNC: 30.1 G/DL (ref 33.7–35.3)
MCV RBC AUTO: 103.9 FL (ref 81.4–97.8)
MONOCYTES # BLD AUTO: 0.39 K/UL (ref 0–0.85)
MONOCYTES NFR BLD AUTO: 11.2 % (ref 0–13.4)
NEUTROPHILS # BLD AUTO: 2.69 K/UL (ref 1.82–7.42)
NEUTROPHILS NFR BLD: 77.3 % (ref 44–72)
NRBC # BLD AUTO: 0 K/UL
NRBC BLD-RTO: 0 /100 WBC
PLATELET # BLD AUTO: 225 K/UL (ref 164–446)
PMV BLD AUTO: 10.1 FL (ref 9–12.9)
POTASSIUM SERPL-SCNC: 4.2 MMOL/L (ref 3.6–5.5)
PRODUCT TYPE UPROD: NORMAL
PROT SERPL-MCNC: 7.2 G/DL (ref 6–8.2)
RBC # BLD AUTO: 2.3 M/UL (ref 4.7–6.1)
RH BLD: NORMAL
SODIUM SERPL-SCNC: 138 MMOL/L (ref 135–145)
UNIT STATUS USTAT: NORMAL
WBC # BLD AUTO: 3.5 K/UL (ref 4.8–10.8)

## 2020-11-23 PROCEDURE — 86900 BLOOD TYPING SEROLOGIC ABO: CPT

## 2020-11-23 PROCEDURE — 71045 X-RAY EXAM CHEST 1 VIEW: CPT

## 2020-11-23 PROCEDURE — 36430 TRANSFUSION BLD/BLD COMPNT: CPT

## 2020-11-23 PROCEDURE — P9016 RBC LEUKOCYTES REDUCED: HCPCS

## 2020-11-23 PROCEDURE — 86901 BLOOD TYPING SEROLOGIC RH(D): CPT

## 2020-11-23 PROCEDURE — 87040 BLOOD CULTURE FOR BACTERIA: CPT

## 2020-11-23 PROCEDURE — 86850 RBC ANTIBODY SCREEN: CPT

## 2020-11-23 PROCEDURE — 99285 EMERGENCY DEPT VISIT HI MDM: CPT

## 2020-11-23 PROCEDURE — 36415 COLL VENOUS BLD VENIPUNCTURE: CPT

## 2020-11-23 PROCEDURE — 80053 COMPREHEN METABOLIC PANEL: CPT

## 2020-11-23 PROCEDURE — 86923 COMPATIBILITY TEST ELECTRIC: CPT

## 2020-11-23 PROCEDURE — 85025 COMPLETE CBC W/AUTO DIFF WBC: CPT

## 2020-11-23 PROCEDURE — 83605 ASSAY OF LACTIC ACID: CPT

## 2020-11-23 ASSESSMENT — LIFESTYLE VARIABLES: DO YOU DRINK ALCOHOL: NO

## 2020-11-23 ASSESSMENT — FIBROSIS 4 INDEX: FIB4 SCORE: 2.95

## 2020-11-24 VITALS
BODY MASS INDEX: 25.49 KG/M2 | WEIGHT: 168.21 LBS | OXYGEN SATURATION: 100 % | SYSTOLIC BLOOD PRESSURE: 125 MMHG | DIASTOLIC BLOOD PRESSURE: 63 MMHG | HEIGHT: 68 IN | TEMPERATURE: 98.1 F | HEART RATE: 90 BPM | RESPIRATION RATE: 27 BRPM

## 2020-11-24 NOTE — ED PROVIDER NOTES
ED Provider Note    CHIEF COMPLAINT  Chief Complaint   Patient presents with   • Sent by MD     Pt sent from HD for low H&H   • Abnormal Labs     Per paperwork Hemoglobin 6.5       HPI  Parmjit Castelan is a 80 y.o. male who presents to the emergency department chief complaint of fatigue and a low H&H.  Patient is a end-stage renal patient on dialysis Monday Wednesday Friday.  He was at dialysis today and they rolando his blood and told his hemoglobin was 6.3 and so were told to come to the emergency department for transfusion.  His nephrologist is Dr. Pisano.  He was just hospitalized for anemia and received transfusions.  His wife states since he went home he just been really short of breath and fatigued he is just tired.  He still requiring his home oxygen of 3 L and nothing more but just when he gets some walks he just feels generally tired.  No fevers no chills no worsening of his chronic mild cough no unilateral bilateral leg swelling abdominal pain chest pain      REVIEW OF SYSTEMS  Positives as above. Pertinent negatives include nausea vomiting cough congestion chest pain abdominal pain diarrhea constipation flank pain easy bleeding or bruising leg swelling headache dizziness confusion  All other review of systems are negative    PAST MEDICAL HISTORY   has a past medical history of Anesthesia, Anticoagulation monitoring, special range, Aortic stenosis, Arterial leg ulcer ([facility]) (11/8/2018), Atrial flutter ([facility]) (6/12/2019), Basal cell carcinoma of left cheek (3/26/2015), BPH (7/14/2009), Bronchitis (12/25/2018), CAD (coronary artery disease) (2/20/2014), CATARACT, CHF (congestive heart failure), NYHA class II, chronic, systolic ([facility]) E F30 in setting of atrial flutter (6/13/2019), Chronic respiratory failure with hypoxia ([facility]) (5/8/2016), CKD (chronic kidney disease) stage 4, GFR 15-29 ml/min ([facility]) (1/15/2010), COPD (chronic obstructive pulmonary disease) ([facility]), Detached retina, Dialysis, EMPHYSEMA, Glaucoma,  Gout, Heart burn, Heart murmur, Hypertension, Indigestion, Kidney cyst, Leg pain, bilateral (8/10/2015), On supplemental oxygen therapy, Peripheral vascular disease (HCC) (8/10/2015), Pneumonia, Primary insomnia (3/22/2018), Proteinuria, PVD (peripheral vascular disease) ([facility]), and Sleep apnea.    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       SURGICAL HISTORY   has a past surgical history that includes cataract phaco with iol (08); av fistula creation (2010); av fistula revision (2010); av fistulogram (2010); angioplasty balloon (2010); av fistulogram (2010); angioplasty balloon (2010); vitrectomy posterior (2011); scleral buckling (2011); recovery (2011); vitrectomy posterior (10/11/2011); recovery (2012); recovery (2013); recovery (2013); av fistula thrombolysis (2013); recovery (2013); recovery (3/24/2014); recovery (2014); recovery (3/24/2015); recovery (2015); gastroscopy with biopsy (2016); colonoscopy - endo (8/15/2016); endoscopy procedure (3/21/2018); femoral endarterectomy (Left, 2019); femoral popliteal bypass (Left, 2019); angiogram (Left, 2019); other orthopedic surgery (); av fistula creation (Right, 2019); lisa (2019); cardioversion (2019); av fistula creation (Right, 2019); cath placement (Right, 2019); stent placement (2020); gastroscopy-endo (2020); small bowel endoscopy,past 2nd duod (2020); colonoscopy - endo (2020); colonoscopy,diagnostic  "(2/19/2020); gastroscopy (2/19/2020); capsule endoscopy administration (N/A, 2/20/2020); capsule endoscopy retrieval (2/20/2020); colonoscopy,diagnostic (N/A, 2/23/2020); gastroscopy (N/A, 2/23/2020); colonoscopy,flex,w/control, bleeding (N/A, 2/24/2020); gastroscopy w/push enterscopy (N/A, 2/24/2020); and gastroscopy-endo (11/17/2020).    CURRENT MEDICATIONS  Home Medications    **Home medications have not yet been reviewed for this encounter**         ALLERGIES  No Known Allergies    PHYSICAL EXAM  VITAL SIGNS: BP (!) 99/53   Pulse 79   Temp 36.6 °C (97.8 °F) (Temporal)   Resp 16   Ht 1.727 m (5' 8\")   Wt 76.3 kg (168 lb 3.4 oz)   SpO2 95%   BMI 25.58 kg/m²    Pulse ox interpretation: I interpret this pulse ox as normal on oxygen at his baseline  Constitutional: Alert in no apparent distress.  HENT: Normocephalic atraumatic, MMM  Eyes: PER, Conjunctiva pale, Non-icteric.   Neck: Normal range of motion, No tenderness, Supple, No stridor.   Cardiovascular: Regular rate and rhythm, no murmurs.  Right bicep AV fistula with a good thrill and bruit  Thorax & Lungs: Diminished breath sounds at the bases, No respiratory distress, No wheezing, No chest tenderness.   Abdomen: Bowel sounds normal, Soft, No tenderness, No pulsatile masses. No peritoneal signs.  Skin: Warm, Dry, No erythema, No rash.   Back: No bony tenderness, No CVA tenderness.   Extremities/MSK: Intact equal distal pulses, No edema, No tenderness, No cyanosis, no major deformities noted  Neurologic: Alert and oriented x3, No focal deficits noted.       DIFFERENTIAL DIAGNOSIS AND WORK UP PLAN    This is a 80 y.o. male who presents with mild hypotension in the setting of just being dialyzed and coming straight from dialysis, they did a sepsis protocol but honestly the patient does not appear septic believe this is most likely to secondary to the fact that they just took fluid off.  He has been short of breath and fatigue since going home so there could " "be a pneumonia component or little bit of fluid overload or he could be severely anemic which would require transfusion.  No history of bloody stools this is a chronic problem for the patient secondary to his dialysis.  He receives Epogen with dialysis but states it takes him \"a long time to get it back\".    DIAGNOSTIC STUDIES / PROCEDURES    LABS  Pertinent Lab Findings  White blood cell count of 3.5 with a hemoglobin of 7.2 with neutrophil predominance, CMP with chronic kidney disease but no acute electrolyte abnormality lactate mildly elevated 2.4 culture sent      RADIOLOGY  DX-CHEST-PORTABLE (1 VIEW)   Final Result      Hazy right basilar opacity may represent a layering pleural effusion with overlying atelectasis/consolidation.      Linear left basilar atelectasis or scarring.      Atherosclerotic plaque.      Mild cardiomegaly.           The radiologist's interpretation of all radiological studies have been reviewed by me.      COURSE & MEDICAL DECISION MAKING  Pertinent Labs & Imaging studies reviewed. (See chart for details)    Patient has a little bit of a layering pleural effusion on the right which she is had prior according to the family and is required thoracentesis in the past but he is not requiring further oxygen so at this point I do not believe that further evaluation there.  No progressive cough or fevers or chills.  Is a mildly elevated lactic acidosis but he just received dialysis and fluid removal.  All discussed the case with Dr. Guerra    Patient is not receiving IV fluids and resuscitative measures as I believe his vital signs and laboratory analysis is secondary to the fact that he just received hemodialysis    7:36 PM  spoe arslan Guerra with nephrology, the patient is really wanting a transfusion even though his repeat hemoglobin here is 7.2 we discussed the lactate and his vital signs but otherwise he is well-appearing requiring the same oxygen she feels comfortable with the patient receiving " "a transfusion and states that otherwise if he is well-appearing he can be discharged and follow-up with dialysis on Wednesday.    Patient is extremely excited about this    I have explained to the patient the risks and benefits of transfusion of blood products.  This includes, as appropriate, the risk of mild allergic reaction, hemolytic reaction, transfusion-associated lung injury, febrile reactions, circulatory or iron overload, and infection.    We discussed possible alternatives and their risks, including directed donation, autologous transfusion, and no transfusion, including IV or oral iron supplementation, as appropriate.  I believe the patient understands the risks and benefits and was able to express understanding.    10:12PM  Patient is receiving his transfusion and resting comfortably and doing well and not tachypneic nor hypoxic his blood pressure is doing just fine and he and his wife are extremely grateful and feel comfortable going home    /56   Pulse 90   Temp 36.7 °C (98.1 °F) (Temporal)   Resp (!) 25   Ht 1.727 m (5' 8\")   Wt 76.3 kg (168 lb 3.4 oz)   SpO2 100%   BMI 25.58 kg/m²         I verified that the patient was wearing a mask and I was wearing appropriate PPE every time I entered the room. The patient's mask was on the patient at all times during my encounter except for a brief view of the oropharynx.    The patient will return for new or worsening symptoms and is stable at the time of discharge.    The patient is referred to a primary physician for blood pressure management, diabetic screening, and for all other preventative health concerns.    DISPOSITION:  Patient will be discharged home in stable condition.    FOLLOW UP:  Maryse Hartman M.D.  [address] [facility] [address]  [place] [id number] [phone]    Schedule an appointment as soon as possible for a visit       Dialysis    Go on 11/25/2020      [facility], Emergency Dept  [address]  [place] [address]  [phone]    If symptoms worsen      OUTPATIENT MEDICATIONS:  New Prescriptions    No medications on file           FINAL IMPRESSION  1. Other fatigue     2. Anemia due to chronic kidney disease, on chronic dialysis (HCC)             Electronically signed by: Kate Bradshaw M.D., 11/23/2020 6:53 PM    This dictation has been created using voice recognition software and/or scribes. The accuracy of the dictation is limited by the abilities of the software and the expertise of the scribes. I expect there may be some errors of grammar and possibly content. I made every attempt to manually correct the errors within my dictation. However, errors related to voice recognition software and/or scribes may still exist and should be interpreted within the appropriate context.

## 2020-11-24 NOTE — ED TRIAGE NOTES
Chief Complaint   Patient presents with   • Sent by MD     Pt sent from HD for low H&H   • Abnormal Labs     Per paperwork Hemoglobin 6.5     BP (!) 80/44   Pulse (!) 104   Temp 36.6 °C (97.8 °F) (Temporal)   Resp 17   SpO2 95%     Covid Screen Negative.

## 2020-11-24 NOTE — ED TRIAGE NOTES
Pt reports just completed HD, asked to have Hemoglobin level checked and found out it was low.  Pt denies blood in emesis or stools.  Pt reports was recently hospitalized for c/o same.

## 2020-11-24 NOTE — ED NOTES
"RN discussed with patient further observation for possible post-transfusion reactions, but patient and wife declined, state they would like to leave now. Patient is feeling \"fine\" at this time, no new symptoms, no signs of reaction.  Written and verbal discharge instructions given to patient. Patient acknowledges and reports understanding of instructions.  Patient is agreeable to discharge at this time.    "

## 2020-11-24 NOTE — DISCHARGE INSTRUCTIONS
Please continue take your medications as prescribed and follow-up with dialysis on Wednesday as scheduled.  Return to the emergency department for any new or worsening difficulty breathing fevers chills or worsening cough

## 2020-11-28 LAB
BACTERIA BLD CULT: NORMAL
SIGNIFICANT IND 70042: NORMAL
SITE SITE: NORMAL
SOURCE SOURCE: NORMAL

## 2020-12-07 ENCOUNTER — APPOINTMENT (OUTPATIENT)
Dept: RADIOLOGY | Facility: MEDICAL CENTER | Age: 80
DRG: 291 | End: 2020-12-07
Attending: EMERGENCY MEDICINE
Payer: MEDICARE

## 2020-12-07 ENCOUNTER — HOSPITAL ENCOUNTER (INPATIENT)
Facility: MEDICAL CENTER | Age: 80
LOS: 3 days | DRG: 291 | End: 2020-12-10
Attending: EMERGENCY MEDICINE | Admitting: INTERNAL MEDICINE
Payer: MEDICARE

## 2020-12-07 DIAGNOSIS — R79.89 ELEVATED BRAIN NATRIURETIC PEPTIDE (BNP) LEVEL: ICD-10-CM

## 2020-12-07 DIAGNOSIS — I95.9 HYPOTENSION, UNSPECIFIED HYPOTENSION TYPE: ICD-10-CM

## 2020-12-07 DIAGNOSIS — E87.5 HYPERKALEMIA: ICD-10-CM

## 2020-12-07 DIAGNOSIS — R06.00 DYSPNEA, UNSPECIFIED TYPE: ICD-10-CM

## 2020-12-07 DIAGNOSIS — I50.23 ACUTE ON CHRONIC HFREF (HEART FAILURE WITH REDUCED EJECTION FRACTION) (HCC): ICD-10-CM

## 2020-12-07 DIAGNOSIS — N18.6 ESRD (END STAGE RENAL DISEASE) ON DIALYSIS (HCC): ICD-10-CM

## 2020-12-07 DIAGNOSIS — I50.33 ACUTE ON CHRONIC HEART FAILURE WITH PRESERVED EJECTION FRACTION (HCC): ICD-10-CM

## 2020-12-07 DIAGNOSIS — I35.0 NONRHEUMATIC AORTIC VALVE STENOSIS: ICD-10-CM

## 2020-12-07 DIAGNOSIS — R79.89 ELEVATED TROPONIN: ICD-10-CM

## 2020-12-07 DIAGNOSIS — Z99.2 ESRD (END STAGE RENAL DISEASE) ON DIALYSIS (HCC): ICD-10-CM

## 2020-12-07 DIAGNOSIS — I73.9 PAD (PERIPHERAL ARTERY DISEASE) (HCC): ICD-10-CM

## 2020-12-07 LAB
ALBUMIN SERPL BCP-MCNC: 4 G/DL (ref 3.2–4.9)
ALBUMIN/GLOB SERPL: 1.2 G/DL
ALP SERPL-CCNC: 132 U/L (ref 30–99)
ALT SERPL-CCNC: 14 U/L (ref 2–50)
ANION GAP SERPL CALC-SCNC: 23 MMOL/L (ref 7–16)
ANISOCYTOSIS BLD QL SMEAR: ABNORMAL
AST SERPL-CCNC: 21 U/L (ref 12–45)
BASOPHILS # BLD AUTO: 0.2 % (ref 0–1.8)
BASOPHILS # BLD: 0.01 K/UL (ref 0–0.12)
BILIRUB SERPL-MCNC: 0.6 MG/DL (ref 0.1–1.5)
BUN SERPL-MCNC: 59 MG/DL (ref 8–22)
CALCIUM SERPL-MCNC: 10.7 MG/DL (ref 8.4–10.2)
CHLORIDE SERPL-SCNC: 93 MMOL/L (ref 96–112)
CO2 SERPL-SCNC: 22 MMOL/L (ref 20–33)
COMMENT 1642: NORMAL
COVID ORDER STATUS COVID19: NORMAL
CREAT SERPL-MCNC: 8.23 MG/DL (ref 0.5–1.4)
EKG IMPRESSION: NORMAL
EKG IMPRESSION: NORMAL
EOSINOPHIL # BLD AUTO: 0.03 K/UL (ref 0–0.51)
EOSINOPHIL NFR BLD: 0.5 % (ref 0–6.9)
ERYTHROCYTE [DISTWIDTH] IN BLOOD BY AUTOMATED COUNT: 73.4 FL (ref 35.9–50)
FLUAV RNA SPEC QL NAA+PROBE: NEGATIVE
FLUBV RNA SPEC QL NAA+PROBE: NEGATIVE
GLOBULIN SER CALC-MCNC: 3.3 G/DL (ref 1.9–3.5)
GLUCOSE SERPL-MCNC: 101 MG/DL (ref 65–99)
HCT VFR BLD AUTO: 26.1 % (ref 42–52)
HGB BLD-MCNC: 7.4 G/DL (ref 14–18)
IMM GRANULOCYTES # BLD AUTO: 0.02 K/UL (ref 0–0.11)
IMM GRANULOCYTES NFR BLD AUTO: 0.4 % (ref 0–0.9)
LACTATE BLD-SCNC: 4.9 MMOL/L (ref 0.5–2)
LYMPHOCYTES # BLD AUTO: 0.35 K/UL (ref 1–4.8)
LYMPHOCYTES NFR BLD: 6.2 % (ref 22–41)
MACROCYTES BLD QL SMEAR: ABNORMAL
MCH RBC QN AUTO: 32 PG (ref 27–33)
MCHC RBC AUTO-ENTMCNC: 28.4 G/DL (ref 33.7–35.3)
MCV RBC AUTO: 113 FL (ref 81.4–97.8)
MONOCYTES # BLD AUTO: 0.27 K/UL (ref 0–0.85)
MONOCYTES NFR BLD AUTO: 4.8 % (ref 0–13.4)
NEUTROPHILS # BLD AUTO: 4.97 K/UL (ref 1.82–7.42)
NEUTROPHILS NFR BLD: 87.9 % (ref 44–72)
NRBC # BLD AUTO: 0 K/UL
NRBC BLD-RTO: 0 /100 WBC
NT-PROBNP SERPL IA-MCNC: ABNORMAL PG/ML (ref 0–125)
PLATELET # BLD AUTO: 237 K/UL (ref 164–446)
PLATELET BLD QL SMEAR: NORMAL
PMV BLD AUTO: 10.6 FL (ref 9–12.9)
POLYCHROMASIA BLD QL SMEAR: NORMAL
POTASSIUM SERPL-SCNC: 5.9 MMOL/L (ref 3.6–5.5)
PROT SERPL-MCNC: 7.3 G/DL (ref 6–8.2)
RBC # BLD AUTO: 2.31 M/UL (ref 4.7–6.1)
RBC BLD AUTO: PRESENT
RSV RNA SPEC QL NAA+PROBE: NEGATIVE
SARS-COV-2 RNA RESP QL NAA+PROBE: NOTDETECTED
SODIUM SERPL-SCNC: 138 MMOL/L (ref 135–145)
SPECIMEN SOURCE: NORMAL
TROPONIN T SERPL-MCNC: 292 NG/L (ref 6–19)
WBC # BLD AUTO: 5.7 K/UL (ref 4.8–10.8)

## 2020-12-07 PROCEDURE — 700105 HCHG RX REV CODE 258: Performed by: INTERNAL MEDICINE

## 2020-12-07 PROCEDURE — A9270 NON-COVERED ITEM OR SERVICE: HCPCS | Performed by: INTERNAL MEDICINE

## 2020-12-07 PROCEDURE — 71045 X-RAY EXAM CHEST 1 VIEW: CPT

## 2020-12-07 PROCEDURE — 94660 CPAP INITIATION&MGMT: CPT

## 2020-12-07 PROCEDURE — 94640 AIRWAY INHALATION TREATMENT: CPT

## 2020-12-07 PROCEDURE — 700101 HCHG RX REV CODE 250: Performed by: INTERNAL MEDICINE

## 2020-12-07 PROCEDURE — 83605 ASSAY OF LACTIC ACID: CPT

## 2020-12-07 PROCEDURE — 36415 COLL VENOUS BLD VENIPUNCTURE: CPT

## 2020-12-07 PROCEDURE — 90935 HEMODIALYSIS ONE EVALUATION: CPT

## 2020-12-07 PROCEDURE — 5A1D70Z PERFORMANCE OF URINARY FILTRATION, INTERMITTENT, LESS THAN 6 HOURS PER DAY: ICD-10-PCS | Performed by: INTERNAL MEDICINE

## 2020-12-07 PROCEDURE — 83880 ASSAY OF NATRIURETIC PEPTIDE: CPT

## 2020-12-07 PROCEDURE — C9803 HOPD COVID-19 SPEC COLLECT: HCPCS | Performed by: EMERGENCY MEDICINE

## 2020-12-07 PROCEDURE — 80053 COMPREHEN METABOLIC PANEL: CPT

## 2020-12-07 PROCEDURE — 0241U HCHG SARS-COV-2 COVID-19 NFCT DS RESP RNA 4 TRGT MIC: CPT

## 2020-12-07 PROCEDURE — 99291 CRITICAL CARE FIRST HOUR: CPT | Performed by: INTERNAL MEDICINE

## 2020-12-07 PROCEDURE — 99291 CRITICAL CARE FIRST HOUR: CPT

## 2020-12-07 PROCEDURE — 700102 HCHG RX REV CODE 250 W/ 637 OVERRIDE(OP): Performed by: INTERNAL MEDICINE

## 2020-12-07 PROCEDURE — 3E043XZ INTRODUCTION OF VASOPRESSOR INTO CENTRAL VEIN, PERCUTANEOUS APPROACH: ICD-10-PCS | Performed by: INTERNAL MEDICINE

## 2020-12-07 PROCEDURE — 85025 COMPLETE CBC W/AUTO DIFF WBC: CPT

## 2020-12-07 PROCEDURE — 84484 ASSAY OF TROPONIN QUANT: CPT

## 2020-12-07 PROCEDURE — 93005 ELECTROCARDIOGRAM TRACING: CPT | Performed by: INTERNAL MEDICINE

## 2020-12-07 PROCEDURE — 87040 BLOOD CULTURE FOR BACTERIA: CPT

## 2020-12-07 PROCEDURE — 700101 HCHG RX REV CODE 250: Performed by: EMERGENCY MEDICINE

## 2020-12-07 PROCEDURE — 770022 HCHG ROOM/CARE - ICU (200)

## 2020-12-07 PROCEDURE — 93005 ELECTROCARDIOGRAM TRACING: CPT

## 2020-12-07 PROCEDURE — 93010 ELECTROCARDIOGRAM REPORT: CPT | Performed by: INTERNAL MEDICINE

## 2020-12-07 PROCEDURE — 94760 N-INVAS EAR/PLS OXIMETRY 1: CPT

## 2020-12-07 RX ORDER — OMEPRAZOLE 20 MG/1
40 CAPSULE, DELAYED RELEASE ORAL DAILY
Status: DISCONTINUED | OUTPATIENT
Start: 2020-12-07 | End: 2020-12-10 | Stop reason: HOSPADM

## 2020-12-07 RX ORDER — MONTELUKAST SODIUM 10 MG/1
10 TABLET ORAL DAILY
Status: DISCONTINUED | OUTPATIENT
Start: 2020-12-07 | End: 2020-12-10 | Stop reason: HOSPADM

## 2020-12-07 RX ORDER — ASCORBIC ACID, THIAMINE, RIBOFLAVIN, NIACINAMIDE, PYRIDOXINE, FOLIC ACID, COBALAMIN, BIOTIN, PANTOTHENIC ACID 100; 1.5; 1.7; 20; 10; 1; 6; 300; 1 MG/1; MG/1; MG/1; MG/1; MG/1; MG/1; UG/1; UG/1; MG/1
1 TABLET, COATED ORAL EVERY EVENING
Status: DISCONTINUED | OUTPATIENT
Start: 2020-12-07 | End: 2020-12-07

## 2020-12-07 RX ORDER — TRAZODONE HYDROCHLORIDE 50 MG/1
150 TABLET ORAL 2 TIMES DAILY
Status: DISCONTINUED | OUTPATIENT
Start: 2020-12-07 | End: 2020-12-07

## 2020-12-07 RX ORDER — NOREPINEPHRINE BITARTRATE 0.03 MG/ML
0-30 INJECTION, SOLUTION INTRAVENOUS CONTINUOUS
Status: DISCONTINUED | OUTPATIENT
Start: 2020-12-07 | End: 2020-12-09

## 2020-12-07 RX ORDER — TAMSULOSIN HYDROCHLORIDE 0.4 MG/1
0.8 CAPSULE ORAL EVERY EVENING
Status: DISCONTINUED | OUTPATIENT
Start: 2020-12-07 | End: 2020-12-10 | Stop reason: HOSPADM

## 2020-12-07 RX ORDER — TRAZODONE HYDROCHLORIDE 50 MG/1
150 TABLET ORAL 2 TIMES DAILY
Status: DISCONTINUED | OUTPATIENT
Start: 2020-12-07 | End: 2020-12-10 | Stop reason: HOSPADM

## 2020-12-07 RX ORDER — ROPINIROLE 1 MG/1
1 TABLET, FILM COATED ORAL
Status: DISCONTINUED | OUTPATIENT
Start: 2020-12-07 | End: 2020-12-10 | Stop reason: HOSPADM

## 2020-12-07 RX ORDER — AMOXICILLIN 250 MG/1
250 CAPSULE ORAL 3 TIMES DAILY
Status: SHIPPED | COMMUNITY
Start: 2020-11-05 | End: 2020-12-07

## 2020-12-07 RX ORDER — LIDOCAINE HYDROCHLORIDE 10 MG/ML
1 INJECTION, SOLUTION INFILTRATION; PERINEURAL
Status: COMPLETED | OUTPATIENT
Start: 2020-12-07 | End: 2020-12-07

## 2020-12-07 RX ORDER — FLUTICASONE PROPIONATE 50 MCG
2 SPRAY, SUSPENSION (ML) NASAL 2 TIMES DAILY
Status: DISCONTINUED | OUTPATIENT
Start: 2020-12-07 | End: 2020-12-10 | Stop reason: HOSPADM

## 2020-12-07 RX ORDER — TORSEMIDE 20 MG/1
20 TABLET ORAL
Status: DISCONTINUED | OUTPATIENT
Start: 2020-12-07 | End: 2020-12-10 | Stop reason: HOSPADM

## 2020-12-07 RX ORDER — HEPARIN SODIUM 1000 [USP'U]/ML
1500 INJECTION, SOLUTION INTRAVENOUS; SUBCUTANEOUS
Status: DISCONTINUED | OUTPATIENT
Start: 2020-12-07 | End: 2020-12-10 | Stop reason: HOSPADM

## 2020-12-07 RX ORDER — CALCIUM ACETATE 667 MG/1
667 TABLET ORAL
Status: DISCONTINUED | OUTPATIENT
Start: 2020-12-07 | End: 2020-12-10 | Stop reason: HOSPADM

## 2020-12-07 RX ORDER — ROSUVASTATIN CALCIUM 10 MG/1
40 TABLET, COATED ORAL EVERY EVENING
Status: DISCONTINUED | OUTPATIENT
Start: 2020-12-07 | End: 2020-12-10 | Stop reason: HOSPADM

## 2020-12-07 RX ORDER — ALBUTEROL SULFATE 90 UG/1
2 AEROSOL, METERED RESPIRATORY (INHALATION) EVERY 4 HOURS PRN
Status: DISCONTINUED | OUTPATIENT
Start: 2020-12-07 | End: 2020-12-10 | Stop reason: HOSPADM

## 2020-12-07 RX ORDER — HYDROCODONE BITARTRATE AND ACETAMINOPHEN 5; 325 MG/1; MG/1
TABLET ORAL
Status: SHIPPED | COMMUNITY
Start: 2020-11-05 | End: 2020-12-07

## 2020-12-07 RX ADMIN — ROPINIROLE HYDROCHLORIDE 1 MG: 0.5 TABLET, FILM COATED ORAL at 22:40

## 2020-12-07 RX ADMIN — OMEPRAZOLE 40 MG: 20 CAPSULE, DELAYED RELEASE ORAL at 15:20

## 2020-12-07 RX ADMIN — NOREPINEPHRINE BITARTRATE 2 MCG/MIN: 1 INJECTION INTRAVENOUS at 17:17

## 2020-12-07 RX ADMIN — ALBUTEROL SULFATE 2.5 MG: 2.5 SOLUTION RESPIRATORY (INHALATION) at 10:18

## 2020-12-07 RX ADMIN — TRAZODONE HYDROCHLORIDE 150 MG: 50 TABLET ORAL at 22:40

## 2020-12-07 RX ADMIN — TORSEMIDE 20 MG: 20 TABLET ORAL at 15:20

## 2020-12-07 RX ADMIN — ROSUVASTATIN CALCIUM 40 MG: 10 TABLET, FILM COATED ORAL at 17:16

## 2020-12-07 RX ADMIN — LIDOCAINE HYDROCHLORIDE 1 ML: 10 INJECTION, SOLUTION INFILTRATION; PERINEURAL at 17:15

## 2020-12-07 ASSESSMENT — PULMONARY FUNCTION TESTS
EPAP_CMH2O: 12
EPAP_CMH2O: 12

## 2020-12-07 ASSESSMENT — PAIN DESCRIPTION - PAIN TYPE
TYPE: ACUTE PAIN;CHRONIC PAIN
TYPE: ACUTE PAIN

## 2020-12-07 ASSESSMENT — FIBROSIS 4 INDEX
FIB4 SCORE: 2.21
FIB4 SCORE: 1.85

## 2020-12-07 NOTE — H&P
"Pulmonary History & Physical Note    Date of Service  12/7/2020    Primary Care Physician  Maryse Hartman M.D.    Consultants  Jose Eduardo Peters MD    Code Status  Prior    Chief Complaint  Chief Complaint   Patient presents with   • Shortness of Breath     Pt reports is unable to \"catch my breath\"   • Sent by MD     Pt reports sent by Cardiologist       History of Presenting Illness  80 y.o. male who presented 12/7/2020 with worsening SOB. Patient states that he sometimes get SOB on Monday after receiving hemodialysis on Fri. No infectious symptoms. Worsening SOB over the weekend. Could not lie flat on the bed.  Mild wheezing. No other symptoms.   No recent exposure to COVID positive person. Wife is OK.    Review of Systems  ROS    Past Medical History   has a past medical history of Anesthesia, Anticoagulation monitoring, special range, Aortic stenosis, Arterial leg ulcer ([facility]) (11/8/2018), Atrial flutter ([facility]) (6/12/2019), Basal cell carcinoma of left cheek (3/26/2015), BPH (7/14/2009), Bronchitis (12/25/2018), CAD (coronary artery disease) (2/20/2014), CATARACT, CHF (congestive heart failure), NYHA class II, chronic, systolic ([facility]) E F30 in setting of atrial flutter (6/13/2019), Chronic respiratory failure with hypoxia ([facility]) (5/8/2016), CKD (chronic kidney disease) stage 4, GFR 15-29 ml/min ([facility]) (1/15/2010), COPD (chronic obstructive pulmonary disease) ([facility]), Detached retina, Dialysis, EMPHYSEMA, Glaucoma, Gout, Heart burn, Heart murmur, Hypertension, Indigestion, Kidney cyst, Leg pain, bilateral (8/10/2015), On supplemental oxygen therapy, Peripheral vascular disease ([facility]) (8/10/2015), Pneumonia, Primary insomnia (3/22/2018), Proteinuria, PVD (peripheral vascular disease) ([facility]), and Sleep apnea.    Surgical History   has a past surgical history that includes cataract phaco with iol (4/8/08); av fistula creation (2/12/2010); av fistula revision (2/19/2010); av fistulogram (7/23/2010); angioplasty balloon " (7/23/2010); av fistulogram (9/17/2010); angioplasty balloon (9/17/2010); vitrectomy posterior (1/18/2011); scleral buckling (1/18/2011); recovery (8/12/2011); vitrectomy posterior (10/11/2011); recovery (7/23/2012); recovery (1/29/2013); recovery (7/2/2013); av fistula thrombolysis (7/2/2013); recovery (12/17/2013); recovery (3/24/2014); recovery (7/29/2014); recovery (3/24/2015); recovery (12/23/2015); gastroscopy with biopsy (8/13/2016); colonoscopy - endo (8/15/2016); endoscopy procedure (3/21/2018); femoral endarterectomy (Left, 1/25/2019); femoral popliteal bypass (Left, 1/25/2019); angiogram (Left, 1/25/2019); other orthopedic surgery (1998); av fistula creation (Right, 2/21/2019); lisa (6/13/2019); cardioversion (6/13/2019); av fistula creation (Right, 8/6/2019); cath placement (Right, 8/6/2019); stent placement (01/26/2020); gastroscopy-endo (2/7/2020); pr small bowel endoscopy,past 2nd duod (2/19/2020); colonoscopy - endo (2/19/2020); pr colonoscopy,diagnostic (2/19/2020); gastroscopy (2/19/2020); capsule endoscopy administration (N/A, 2/20/2020); capsule endoscopy retrieval (2/20/2020); pr colonoscopy,diagnostic (N/A, 2/23/2020); gastroscopy (N/A, 2/23/2020); pr colonoscopy,flex,w/control, bleeding (N/A, 2/24/2020); gastroscopy w/push enterscopy (N/A, 2/24/2020); and gastroscopy-endo (11/17/2020).     Family History  family history includes Heart Disease in his brother; Hypertension in his brother, mother, and son; Lung Disease in his father; No Known Problems in his son; Stroke in his mother.     Social History   reports that he quit smoking about 11 years ago. His smoking use included cigarettes. He has a 40.00 pack-year smoking history. He has never used smokeless tobacco. He reports that he does not drink alcohol or use drugs.    Allergies  No Known Allergies    Medications  Prior to Admission Medications   Prescriptions Last Dose Informant Patient Reported? Taking?   B Complex-C-Folic Acid (DIALYVITE  PO) 12/6/2020 at PM Significant Other Yes No   Sig: Take 1 Tab by mouth every evening.   ROPINIRole (REQUIP) 0.5 MG Tab 12/6/2020 at PM Significant Other No No   Sig: Take 2 Tabs by mouth every bedtime.   albuterol 108 (90 Base) MCG/ACT Aero Soln inhalation aerosol 12/6/2020 at PM Significant Other No No   Sig: Inhale 2 Puffs by mouth every four hours as needed for Shortness of Breath.   calcium acetate (PHOS-LO) 667 MG Cap 12/6/2020 at PM Significant Other Yes No   Sig: Take 1,334-2,001 mg by mouth See Admin Instructions. Pt reports that he takes 2 cap for each snack and 3 cap 3 times a day with meals   fluticasone (FLONASE) 50 MCG/ACT nasal spray 12/6/2020 at PM Significant Other No No   Sig: SPRAY ONE TO TWO SPRAYS IN EACH NOSTRIL ONCE DAILY *FLONASE*   montelukast (SINGULAIR) 10 MG Tab 12/6/2020 at PM Significant Other No No   Sig: Take 1 Tab by mouth every day.   omeprazole (PRILOSEC) 40 MG delayed-release capsule 12/6/2020 at AM Significant Other No No   Sig: Take 1 Cap by mouth every day.   rosuvastatin (CRESTOR) 40 MG tablet 12/6/2020 at PM Significant Other No No   Sig: Take 1 Tab by mouth every evening.   tamsulosin (FLOMAX) 0.4 MG capsule 12/6/2020 at PM Significant Other No No   Sig: Take 2 Caps by mouth every evening.   torsemide (DEMADEX) 10 MG tablet 12/6/2020 at AM Significant Other No No   Sig: Take 3 Tabs by mouth every day.   Patient taking differently: Take 30 mg by mouth every morning. 3 tablets = 30 mg   traZODone (DESYREL) 150 MG Tab 12/6/2020 at PM Significant Other No No   Sig: Take 1 Tab by mouth See Admin Instructions. Pt takes 150MG @ 2130 and another 150MG @ 0130      Facility-Administered Medications: None       Physical Exam  Temp:  [36.2 °C (97.1 °F)-36.7 °C (98.1 °F)] 36.7 °C (98.1 °F)  Pulse:  [51-97] 78  Resp:  [15-54] 26  BP: ()/(47-62) 98/52  SpO2:  [93 %-100 %] 100 %    Physical Exam    Laboratory:  Recent Labs     12/07/20  0947   WBC 5.7   RBC 2.31*   HEMOGLOBIN 7.4*    HEMATOCRIT 26.1*   .0*   MCH 32.0   MCHC 28.4*   RDW 73.4*   PLATELETCT 237   MPV 10.6     Recent Labs     12/07/20  0947   SODIUM 138   POTASSIUM 5.9*   CHLORIDE 93*   CO2 22   GLUCOSE 101*   BUN 59*   CREATININE 8.23*   CALCIUM 10.7*     Recent Labs     12/07/20  0947   ALTSGPT 14   ASTSGOT 21   ALKPHOSPHAT 132*   TBILIRUBIN 0.6   GLUCOSE 101*         Recent Labs     12/07/20  0947   NTPROBNP >55314*         Recent Labs     12/07/20  0947   TROPONINT 292*       Imaging:  DX-CHEST-PORTABLE (1 VIEW)   Final Result      Worsening interstitial edema, stable small right effusion      Stable cardiac silhouette enlargement      Resolved left basilar atelectasis            Assessment/Plan:  I anticipate this patient will require at least two midnights for appropriate medical management, necessitating inpatient admission.    ESRD (end stage renal disease) on dialysis ([facility])- (present on admission)  Assessment & Plan  - Patient appears in volume overload - last HD session was on Fri.   - Compliant with his HD.   - Known history of HFpEF (Grade II diastolic dysfunction).   - Likely a combination of effect.   - Will monitor closely after initiation of HD.   - Discussed with Dr Enriquez.     Hemodialysis-associated hypotension  Assessment & Plan  - Hypotensive with dialysis session. States that sometimes they have to consider slow hemodialysis to achieve fluid removal.   - Will consider adding vasopressors if needed.     PAD (peripheral artery disease) ([facility])- (present on admission)  Assessment & Plan  Consult wound care for non healing small ulcer in right ankle    Coronary artery disease- (present on admission)  Assessment & Plan  Stable.  No acute issues.    Restless leg syndrome- (present on admission)  Assessment & Plan  - Continue home medications.     COPD (chronic obstructive pulmonary disease) ([facility])- (present on admission)  Assessment & Plan  - Well controlled.  - No acute issues.     HELGA (obstructive sleep  apnea)- (present on admission)  Assessment & Plan  - Will consider NIV at night.   - Patient tolerating it OK.         Critical Care time: 38 min

## 2020-12-07 NOTE — DISCHARGE PLANNING
ER CM spoke with wife. Pt on bipap. He lives in 1 story home with spouse. He gets Dialysis at [facility]. He has a concentrator at home via A1 oxygen no tanks. Was not using. He was independent till about 3 wks ago increased SOB Weakness per wife. PCP Dr Hartman. RX [address]   Care Transition Team Assessment    Information Source  Orientation : Oriented x 4  Information Given By: Spouse  Informant's Name: Yaquelin  Who is responsible for making decisions for patient? : Patient         Elopement Risk  Legal Hold: No  Ambulatory or Self Mobile in Wheelchair: No-Not an Elopement Risk    Interdisciplinary Discharge Planning  Primary Care Physician: Dr Hartman  Lives with - Patient's Self Care Capacity: Spouse  Support Systems: Spouse / Significant Other  Housing / Facility: 1 Story House  Mobility Issues: Yes  Durable Medical Equipment: Home Oxygen  DME Provider / Phone: ([facility])              Finances  Prescription Coverage: Yes([facility] [address] [facility])    Vision / Hearing Impairment  Vision Impairment : Yes(readers)              Domestic Abuse  Have you ever been the victim of abuse or violence?: No              Anticipated Discharge Information  Discharge Disposition: Still a Patient (30)

## 2020-12-07 NOTE — ASSESSMENT & PLAN NOTE
- Patient appears in volume overload - last HD session was on Fri.   - Compliant with his HD.   - Known history of HFpEF (Grade II diastolic dysfunction).   - Likely a combination of effect.   - Underwent X2 sessions of dialysis. Hemodynamics and volume status improved.   - Discussed with Dr Enriquez.

## 2020-12-07 NOTE — PROGRESS NOTES
Patient arrived to unit from ED accompanied by nurse and tech.  Patient is A&O x4, denies pain.  Able to move self from gurney to bed.  C/O SOB, placed on biPap by RT.

## 2020-12-07 NOTE — ED TRIAGE NOTES
"Chief Complaint   Patient presents with   • Shortness of Breath     Pt reports is unable to \"catch my breath\"   • Sent by MD     Pt reports sent by Cardiologist     BP (!) 84/47   Pulse 96   Temp 36.2 °C (97.1 °F) (Temporal)   Resp 15   Ht 1.727 m (5' 8\")   Wt 74.4 kg (164 lb)   SpO2 96%   BMI 24.94 kg/m²     Covid Screen Negative    "

## 2020-12-07 NOTE — ED NOTES
RT at bedside.  Pt requesting evaluation of Right Ankle, wound noted, no bleeding or drainage noted.

## 2020-12-07 NOTE — ED PROVIDER NOTES
"ED Provider Note    CHIEF COMPLAINT  Chief Complaint   Patient presents with   • Shortness of Breath     Pt reports is unable to \"catch my breath\"   • Sent by MD     Pt reports sent by Cardiologist       HPI  Parmjit Castelan is a 80 y.o. male who presents to the emergency department the chief complaint of shortness of breath.  The patient complains of significant shortness of breath.  He says he is just not able to catch his breath.  He feels somewhat wheezy.  He does have a history of COPD.  He is a dialysis patient.  Last dialysis was on Friday.  He is due for dialysis today.  He says that he is at his dry weight.  Denies any fever.  No cough.  Complains of some chest tightness but no fabiana chest pain.  No hemoptysis.  No fevers or chills.  No COVID-19 exposures that he is aware of.    REVIEW OF SYSTEMS  See HPI for further details. All other systems are negative.     PAST MEDICAL HISTORY  Past Medical History:   Diagnosis Date   • Anesthesia     \"needs more sedation\" (can sometimes hear MD during procedure)    • Anticoagulation monitoring, special range    • Aortic stenosis     mod AS- concern for low flow severe AS with rEFof 30%   • Arterial leg ulcer ([facility]) 11/8/2018   • Atrial flutter ([facility]) 6/12/2019   • Basal cell carcinoma of left cheek 3/26/2015   • BPH 7/14/2009   • Bronchitis 12/25/2018   • CAD (coronary artery disease) 2/20/2014   • CATARACT     sanjuanita surgery complete   • CHF (congestive heart failure), NYHA class II, chronic, systolic ([facility]) E F30 in setting of atrial flutter 6/13/2019   • Chronic respiratory failure with hypoxia ([facility]) 5/8/2016   • CKD (chronic kidney disease) stage 4, GFR 15-29 ml/min ([facility]) 1/15/2010    end stage renal disease   • COPD (chronic obstructive pulmonary disease) ([facility])     wears 2.5 L o2 sometimes when he sleeps   • Detached retina    • Dialysis     [place],w,f [place]   • EMPHYSEMA    • Glaucoma     Early stage   • Gout    • Heart burn     occas   • Heart murmur     " maria esther lee cardiologist   • Hypertension    • Indigestion     occas   • Kidney cyst    • Leg pain, bilateral 8/10/2015   • On supplemental oxygen therapy    • Peripheral vascular disease (HCC) 8/10/2015   • Pneumonia    • Primary insomnia 3/22/2018   • Proteinuria    • PVD (peripheral vascular disease) ([facility])    • Sleep apnea     O2 PER CANULA  AT NIGHT 2l/m       FAMILY HISTORY  Family History   Problem Relation Age of Onset   • Stroke Mother    • Hypertension Mother    • Lung Disease Father         Emphysema, resp failure   • Hypertension Brother    • Heart Disease Brother    • Hypertension Son    • No Known Problems Son        SOCIAL HISTORY  Social History     Socioeconomic History   • Marital status:      Spouse name: Not on file   • Number of children: Not on file   • Years of education: Not on file   • Highest education level: Not on file   Occupational History   • Not on file   Social Needs   • Financial resource strain: Not on file   • Food insecurity     Worry: Not on file     Inability: Not on file   • Transportation needs     Medical: Not on file     Non-medical: Not on file   Tobacco Use   • Smoking status: Former Smoker     Packs/day: 1.00     Years: 40.00     Pack years: 40.00     Types: Cigarettes     Quit date: 2009     Years since quittin.9   • Smokeless tobacco: Never Used   Substance and Sexual Activity   • Alcohol use: No     Alcohol/week: 0.0 oz   • Drug use: No   • Sexual activity: Never     Partners: Female     Comment: , two sons, 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   Lifestyle   • Physical activity     Days per week: Not on file     Minutes per session: Not on file   • Stress: Not on file   Relationships   • Social connections     Talks on phone: Not on file     Gets together: Not on file     Attends Oriental orthodox service: Not on file     Active member of club or organization: Not on file     Attends meetings of clubs or organizations: Not on file     Relationship status: Not on  file   • Intimate partner violence     Fear of current or ex partner: Not on file     Emotionally abused: Not on file     Physically abused: Not on file     Forced sexual activity: Not on file   Other Topics Concern   • Not on file   Social History Narrative   • Not on file       SURGICAL HISTORY  Past Surgical History:   Procedure Laterality Date   • GASTROSCOPY-ENDO  11/17/2020    Procedure: GASTROSCOPY;  Surgeon: Juan Matthews M.D.;  Location: SURGERY [facility];  Service: Gastroenterology   • PB COLONOSCOPY,FLEX,W/CONTROL, BLEEDING N/A 2/24/2020    Procedure: COLONOSCOPY, WITH ARGON PLASMA COAGULATION;  Surgeon: Juan Matthews M.D.;  Location: SURGERY SAME DAY [facility];  Service: Gastroenterology   • GASTROSCOPY W/PUSH ENTERSCOPY N/A 2/24/2020    Procedure: GASTROSCOPY, WITH PUSH ENTEROSCOPY;  Surgeon: Juan Matthews M.D.;  Location: SURGERY SAME DAY [facility];  Service: Gastroenterology   • PB COLONOSCOPY,DIAGNOSTIC N/A 2/23/2020    Procedure: COLONOSCOPY;  Surgeon: Enrique Child D.O.;  Location: SURGERY [facility];  Service: Gastroenterology   • GASTROSCOPY N/A 2/23/2020    Procedure: GASTROSCOPY;  Surgeon: Enrique Child D.O.;  Location: SURGERY [facility];  Service: Gastroenterology   • CAPSULE ENDOSCOPY ADMINISTRATION N/A 2/20/2020    Procedure: ADMINISTRATION, CAPSULE, FOR CAPSULE ENDOSCOPY;  Surgeon: Gucci Westbrook M.D.;  Location: ENDOSCOPY [facility];  Service: Gastroenterology   • CAPSULE ENDOSCOPY RETRIEVAL  2/20/2020    Procedure: CAPSULE ENDOSCOPY RETRIEVAL;  Surgeon: Gucci Westbrook M.D.;  Location: ENDOSCOPY [facility];  Service: Gastroenterology   • PB SMALL BOWEL ENDOSCOPY,PAST 2ND DUOD  2/19/2020        • COLONOSCOPY - ENDO  2/19/2020        • PB COLONOSCOPY,DIAGNOSTIC  2/19/2020    Procedure: COLONOSCOPY;  Surgeon: Gucci Westbrook M.D.;  Location: SURGERY [facility];  Service: Gastroenterology   • GASTROSCOPY  2/19/2020    Procedure:  GASTROSCOPY;  Surgeon: Gucci Westbrook M.D.;  Location: SURGERY [facility];  Service: Gastroenterology   • GASTROSCOPY-ENDO  2/7/2020    Procedure: GASTROSCOPY;  Surgeon: Jong Carrasquillo M.D.;  Location: ENDOSCOPY [facility];  Service: Gastroenterology   • STENT PLACEMENT  01/26/2020    lda   • AV FISTULA CREATION Right 8/6/2019    Procedure: CREATION, AV FISTULA -  RIGHT ARM  ,  and Ligation of Left Arm Fistula;  Surgeon: Sera Peters M.D.;  Location: SURGERY [facility];  Service: General   • CATH PLACEMENT Right 8/6/2019    Procedure: INSERTION, CATHETER - PERMA ,;  Surgeon: Sera Peters M.D.;  Location: SURGERY [facility];  Service: General   • JUSTIN  6/13/2019    Procedure: ECHOCARDIOGRAM, TRANSESOPHAGEAL;  Surgeon: Harvinder Hoang M.D.;  Location: [facility];  Service: Cardiac   • CARDIOVERSION  6/13/2019    Procedure: CARDIOVERSION;  Surgeon: Harvinder Hoang M.D.;  Location: [facility];  Service: Cardiac   • AV FISTULA CREATION Right 2/21/2019    Procedure: AV FISTULA CREATION - ARM;  Surgeon: David Cason M.D.;  Location: [facility];  Service: General   • FEMORAL ENDARTERECTOMY Left 1/25/2019    Procedure: FEMORAL ENDARTERECTOMY;  Surgeon: David Cason M.D.;  Location: [facility];  Service: General   • FEMORAL POPLITEAL BYPASS Left 1/25/2019    Procedure: LEFT FEMORAL POPLITEAL POLYTETRAFLUOROETHYLENE ePTFE(PROPATEN VASCULAR GRAFT) BYPASS;  Surgeon: David Cason M.D.;  Location: SURGERY [facility];  Service: General   • ANGIOGRAM Left 1/25/2019    Procedure: LEFT LEG ANGIOGRAM;  Surgeon: David Cason M.D.;  Location: [facility];  Service: General   • ENDOSCOPY PROCEDURE  3/21/2018    Procedure: ENDOSCOPY PROCEDURE/UPPER;  Surgeon: Enrique Child D.O.;  Location: [facility];  Service: Gastroenterology   • COLONOSCOPY - ENDO  8/15/2016    Procedure:  COLONOSCOPY - ENDO;  Surgeon: Mane Whatley M.D.;  Location: ENDOSCOPY [facility];  Service:    • GASTROSCOPY WITH BIOPSY  8/13/2016    Procedure: GASTROSCOPY WITH BIOPSY;  Surgeon: Jorge Leavitt M.D.;  Location: ENDOSCOPY [facility];  Service:    • RECOVERY  12/23/2015    Procedure: VASCULAR CASE-CASON-LEFT ARM FISTULOGRAM WITH ANGIOPLASTY;  Surgeon: Recoveryonly Surgery;  Location: SURGERY PRE-POST PROC UNIT [facility];  Service:    • RECOVERY  3/24/2015    Performed by Recoveryonly Surgery at SURGERY PRE-POST PROC UNIT [facility]   • RECOVERY  7/29/2014    Performed by Ir-Recovery Surgery at SURGERY SAME DAY [facility]   • RECOVERY  3/24/2014    Performed by Ir-Recovery Surgery at SURGERY [facility]   • RECOVERY  12/17/2013    Performed by Ir-Recovery Surgery at SURGERY SAME DAY [facility]   • RECOVERY  7/2/2013    Performed by Ir-Recovery Surgery at SURGERY SAME DAY [facility]   • AV FISTULA THROMBOLYSIS  7/2/2013    Performed by David Cason M.D. at SURGERY [facility]   • RECOVERY  1/29/2013    Performed by Ir-Recovery Surgery at SURGERY SAME DAY [facility]   • RECOVERY  7/23/2012    Performed by SURGERY, IR-RECOVERY at SURGERY SAME DAY [facility]   • VITRECTOMY POSTERIOR  10/11/2011    Performed by NAHUM GE at SURGERY SAME DAY [facility]   • RECOVERY  8/12/2011    Performed by SURGERY, IR-RECOVERY at SURGERY SAME DAY [facility]   • VITRECTOMY POSTERIOR  1/18/2011    Performed by NAHUM GE at SURGERY SAME DAY [facility]   • SCLERAL BUCKLING  1/18/2011    Performed by NAHUM GE at SURGERY SAME DAY [facility]   • AV FISTULOGRAM  9/17/2010    Performed by DAVID CASON at SURGERY [facility]   • ANGIOPLASTY BALLOON  9/17/2010    Performed by DAVID CASON at SURGERY [facility]   • AV FISTULOGRAM  7/23/2010    Performed by DAVID CASON at SURGERY [facility]   • ANGIOPLASTY BALLOON  7/23/2010    Performed by DAVID CASON  "at SURGERY [facility]   • AV FISTULA REVISION  2/19/2010    Performed by WILLIE HATCH at SURGERY [facility]   • AV FISTULA CREATION  2/12/2010    Performed by ALESHIA MOSCOSO at SURGERY [facility]   • CATARACT PHACO WITH IOL  4/8/08    Performed by STACEY PHILLIPS at SURGERY SAME DAY [facility]   • OTHER ORTHOPEDIC SURGERY  1998    right toe for facititis       CURRENT MEDICATIONS  Home Medications     Reviewed by Mee Izaguirre (Pharmacy Tech) on 12/07/20 at 1005  Med List Status: Complete   Medication Last Dose Status   albuterol 108 (90 Base) MCG/ACT Aero Soln inhalation aerosol 12/6/2020 Active   B Complex-C-Folic Acid (DIALYVITE PO) 12/6/2020 Active   calcium acetate (PHOS-LO) 667 MG Cap 12/6/2020 Active   fluticasone (FLONASE) 50 MCG/ACT nasal spray 12/6/2020 Active   montelukast (SINGULAIR) 10 MG Tab 12/6/2020 Active   omeprazole (PRILOSEC) 40 MG delayed-release capsule 12/6/2020 Active   ROPINIRole (REQUIP) 0.5 MG Tab 12/6/2020 Active   rosuvastatin (CRESTOR) 40 MG tablet 12/6/2020 Active   tamsulosin (FLOMAX) 0.4 MG capsule 12/6/2020 Active   torsemide (DEMADEX) 10 MG tablet 12/6/2020 Active   traZODone (DESYREL) 150 MG Tab 12/6/2020 Active                ALLERGIES  No Known Allergies    PHYSICAL EXAM  VITAL SIGNS: /61   Pulse 91   Temp 36.2 °C (97.1 °F) (Temporal)   Resp (!) 54   Ht 1.727 m (5' 8\")   Wt 74.4 kg (164 lb)   SpO2 95%   BMI 24.94 kg/m²    Constitutional: Well developed, Well nourished, No acute distress, Non-toxic appearance.   HENT: Normocephalic, Atraumatic, Bilateral external ears normal, Oropharynx moist, No oral exudates, Nose normal.   Eyes: PERRLA, EOMI, Conjunctiva normal, No discharge.   Neck: Normal range of motion, No tenderness, Supple, No stridor.   Cardiovascular: Regular rate and rhythm, systolic ejection murmur noted.  Thorax & Lungs: He is tachypneic.  He has diminished breath sounds.  Bibasilar Rales noted.  Moderate " respiratory distress.  Abdomen: Bowel sounds normal, Soft, No tenderness, No masses, No pulsatile masses.   Skin: Warm, Dry, No erythema, No rash.   Back: No tenderness, No CVA tenderness.   Extremities: Intact distal pulses, No tenderness, No cyanosis, No clubbing.  1+ bilateral lower extremity edema.  Neurologic: Alert & oriented x 3, Normal motor function, Normal sensory function, No focal deficits noted.     EKG  Interpreted by me.  See below.    RADIOLOGY/PROCEDURES  DX-CHEST-PORTABLE (1 VIEW)    (Results Pending)     Results for orders placed or performed during the hospital encounter of 12/07/20   CBC WITH DIFFERENTIAL   Result Value Ref Range    WBC 5.7 4.8 - 10.8 K/uL    RBC 2.31 (L) 4.70 - 6.10 M/uL    Hemoglobin 7.4 (L) 14.0 - 18.0 g/dL    Hematocrit 26.1 (L) 42.0 - 52.0 %    .0 (H) 81.4 - 97.8 fL    MCH 32.0 27.0 - 33.0 pg    MCHC 28.4 (L) 33.7 - 35.3 g/dL    RDW 73.4 (H) 35.9 - 50.0 fL    Platelet Count 237 164 - 446 K/uL    MPV 10.6 9.0 - 12.9 fL    Neutrophils-Polys 87.90 (H) 44.00 - 72.00 %    Lymphocytes 6.20 (L) 22.00 - 41.00 %    Monocytes 4.80 0.00 - 13.40 %    Eosinophils 0.50 0.00 - 6.90 %    Basophils 0.20 0.00 - 1.80 %    Immature Granulocytes 0.40 0.00 - 0.90 %    Nucleated RBC 0.00 /100 WBC    Neutrophils (Absolute) 4.97 1.82 - 7.42 K/uL    Lymphs (Absolute) 0.35 (L) 1.00 - 4.80 K/uL    Monos (Absolute) 0.27 0.00 - 0.85 K/uL    Eos (Absolute) 0.03 0.00 - 0.51 K/uL    Baso (Absolute) 0.01 0.00 - 0.12 K/uL    Immature Granulocytes (abs) 0.02 0.00 - 0.11 K/uL    NRBC (Absolute) 0.00 K/uL    Anisocytosis 3+ (A)     Macrocytosis 3+ (A)    COMP METABOLIC PANEL   Result Value Ref Range    Sodium 138 135 - 145 mmol/L    Potassium 5.9 (H) 3.6 - 5.5 mmol/L    Chloride 93 (L) 96 - 112 mmol/L    Co2 22 20 - 33 mmol/L    Anion Gap 23.0 (H) 7.0 - 16.0    Glucose 101 (H) 65 - 99 mg/dL    Bun 59 (H) 8 - 22 mg/dL    Creatinine 8.23 (HH) 0.50 - 1.40 mg/dL    Calcium 10.7 (H) 8.4 - 10.2 mg/dL     AST(SGOT) 21 12 - 45 U/L    ALT(SGPT) 14 2 - 50 U/L    Alkaline Phosphatase 132 (H) 30 - 99 U/L    Total Bilirubin 0.6 0.1 - 1.5 mg/dL    Albumin 4.0 3.2 - 4.9 g/dL    Total Protein 7.3 6.0 - 8.2 g/dL    Globulin 3.3 1.9 - 3.5 g/dL    A-G Ratio 1.2 g/dL   COVID/SARS CoV-2 PCR    Specimen: Nasopharyngeal; Respirate   Result Value Ref Range    COVID Order Status Received    proBrain Natriuretic Peptide, NT   Result Value Ref Range    NT-proBNP >70536 (H) 0 - 125 pg/mL   TROPONIN   Result Value Ref Range    Troponin T 292 (H) 6 - 19 ng/L   CoV-2, Flu A/B, And RSV by PCR   Result Value Ref Range    Influenza virus A RNA Negative Negative    Influenza virus B, PCR Negative Negative    RSV, PCR Negative Negative    SARS-CoV-2 by PCR NotDetected     SARS-CoV-2 Source NP Swab    ESTIMATED GFR   Result Value Ref Range    GFR If  8 (A) >60 mL/min/1.73 m 2    GFR If Non African American 6 (A) >60 mL/min/1.73 m 2   PLATELET ESTIMATE   Result Value Ref Range    Plt Estimation Normal    MORPHOLOGY   Result Value Ref Range    RBC Morphology Present     Polychromia 1+    DIFFERENTIAL COMMENT   Result Value Ref Range    Comments-Diff see below    EKG   Result Value Ref Range    Report       [facility]    Test Date:  2020  Pt Name:    EDVIN GREEN       Department[facility]  MRN:        [id number]                      [facility]  Gender:     Male                         Technician: [id number]  :        [id number]                   Requested By:ER TRIAGE PROTOCOL  Order #:    [id number]                    Reading MD: PETERSON MURPHY MD    Measurements  Intervals                                Axis  Rate:       93                           P:          -42  VT:         176                          QRS:        -46  QRSD:       136                          T:          188  QT:         360  QTc:        448    Interpretive Statements  SINUS RHYTHM  RIGHT BUNDLE  BRANCH BLOCK  NONSPECIFIC ST DEPRESSION  Compared to ECG 11/16/2020 09:07:57  No significant changes  Electronically Signed On 12-7-2020 12:17:50 PST by PETERSON MURPHY MD       *Note: Due to a large number of results and/or encounters for the requested time period, some results have not been displayed. A complete set of results can be found in Results Review.         COURSE & MEDICAL DECISION MAKING  Pertinent Labs & Imaging studies reviewed. (See chart for details)    Patient presents today with dyspnea.  He clinically appears to be volume overloaded.  However he is also somewhat hypotensive.  Pressures have been soft in the 80s and 90s.  I reviewed the electronic medical record.  The most recent visit he was with a systolic in the 90s but before that normal he had systolics in the 140s.  Does not have any infectious symptoms.  No clinical evidence of sepsis.    EKG demonstrates some nonspecific ST segment depressions.  Laboratory studies are remarkable for findings consistent with end-stage renal disease.  Hyperkalemia at 5.9.  No evidence of hyperkalemia on his EKG.    Patient also has chronic anemia.  Hemoglobin in the sevens noted.  Today's similar to prior values.    BNP is greater than 35,000.  Troponin is mildly elevated but also consistent with prior values.    12:22 PM I have been waiting for the lactic acid to result.  Inquired about this to the nurse.  Apparently there is been some sort of laboratory error and delay.  This is being redrawn at this time.  Pending.    12:23 PM I spoke with the intensivist.  He accepts the patient for admitted to the ICU.  Feel this is appropriate given his low blood pressures.  Patient will likely require dialysis which may be tenuous given his soft blood pressures.  I feel that close monitoring in the intensive care unit is most appropriate.  Admitted in guarded condition.      2:06 PM I spoke with Dr. Galvin who will arrange for dialysis.    CRITICAL CARE  I provided  critical care services, which included medication orders, frequent reevaluations of the patient's condition and response to treatment, ordering and reviewing test results, and discussing the case with various consultants.  The critical care time associated with the care of the patient was 35 minutes. Review chart for interventions. This time is exclusive of any other billable procedures.      FINAL IMPRESSION  1. Hypotension, unspecified hypotension type    2. ESRD (end stage renal disease) on dialysis (HCC)    3. Dyspnea, unspecified type    4. Elevated troponin    5. Elevated brain natriuretic peptide (BNP) level    6. Hyperkalemia              Electronically signed by: Jose Eduardo Peters M.D., 12/7/2020 12:19 PM

## 2020-12-07 NOTE — ED NOTES
PIV placed in [facility], BC x 2 drawn and sent to lab.  Pt updated on POC including pending tests and chart review by ERP.  Pt repositioned self on gurney.  Call light in .

## 2020-12-07 NOTE — CONSULTS
"Consults     REASON FOR CONSULTATION:   - Inpatient ESRD,hemodialysis management and maintenance     CHIEF COMPLAINT:   - Shortness of Breath     HISTORY OF PRESENT ILLNESS:    79 yo M PMH ESRD MWF iHD, HTN, anemia of CKD, and CKD-MBD who presents to ED with CC as above. He denies Covid exposure.  He normally dialyzes at [facility]. Nephrology was asked to consult for ESRD, HD management and maintenance respiectively. Pt is currently SOB and on BiPAP. Serum K+ is 5.9. CXR with pulm edema.    REVIEW OF SYSTEMS:    Unable to fully assess given his overall medical condition.     PAST MEDICAL HISTORY:   - ESRD MWF iHD  - HTN  - Anemia of CKD  - CKD-MBD  - HLD  - CAD  - COPD  - Glaucoma  - AFlutte  - GERD  - PVD  - HELGA  - Chronic systolic HF, EF 30%     PAST SURGICAL HISTORY:   - AVF creation  - Multiple AVF revisions and fistulagrams  - Vitrectomy multiple times  - Gastroscopy  - LHC  - BLE angiogram  - Fem-Pop bypass  - Capsule endoscopy  - Colonoscopy     FAMILY HISTORY:   - Reviewed and non contributory to current illness     SOCIAL HISTORY:   - No tobacco, quit 11 years ago  - No EtOH  - No illicits     HOME MEDICATIONS:   - Reviewed and documented in chart     LABORATORY STUDIES:   - Reviewed and documented in chart     ALLERGIES:  Patient has no known allergies.     PHYSICAL EXAM:  VS:  /61   Pulse 83   Temp 36.7 °C (98 °F) (Temporal)   Resp (!) 22   Ht 1.727 m (5' 8\")   Wt 76.3 kg (168 lb 3.4 oz)   SpO2 100%   BMI 25.58 kg/m²   GEN: WDWN Shortness of Breath  HENT: NC/AT; atraumatic external nose  EYES: No icterus  CVS: RRR; No m/r  RESP: crackles at the base bilateral and labored  GI: SNTND; +BS  MSK: No C/C; Ankle with 2x2cm ulcer, with no sign of active infection  SKIN: No rash     LABS:  Results for JAKE GREEN (MRN [id number]) as of 12/7/2020 14:16   Ref. Range 12/7/2020 09:47   WBC Latest Ref Range: 4.8 - 10.8 K/uL 5.7   RBC Latest Ref Range: 4.70 - 6.10 M/uL 2.31 (L)   Hemoglobin " Latest Ref Range: 14.0 - 18.0 g/dL 7.4 (L)   Hematocrit Latest Ref Range: 42.0 - 52.0 % 26.1 (L)   MCV Latest Ref Range: 81.4 - 97.8 fL 113.0 (H)   MCH Latest Ref Range: 27.0 - 33.0 pg 32.0   MCHC Latest Ref Range: 33.7 - 35.3 g/dL 28.4 (L)   RDW Latest Ref Range: 35.9 - 50.0 fL 73.4 (H)   Platelet Count Latest Ref Range: 164 - 446 K/uL 237   MPV Latest Ref Range: 9.0 - 12.9 fL 10.6   Neutrophils-Polys Latest Ref Range: 44.00 - 72.00 % 87.90 (H)   Neutrophils (Absolute) Latest Ref Range: 1.82 - 7.42 K/uL 4.97   Lymphocytes Latest Ref Range: 22.00 - 41.00 % 6.20 (L)   Lymphs (Absolute) Latest Ref Range: 1.00 - 4.80 K/uL 0.35 (L)   Monocytes Latest Ref Range: 0.00 - 13.40 % 4.80   Monos (Absolute) Latest Ref Range: 0.00 - 0.85 K/uL 0.27   Eosinophils Latest Ref Range: 0.00 - 6.90 % 0.50   Eos (Absolute) Latest Ref Range: 0.00 - 0.51 K/uL 0.03   Basophils Latest Ref Range: 0.00 - 1.80 % 0.20   Baso (Absolute) Latest Ref Range: 0.00 - 0.12 K/uL 0.01   Immature Granulocytes Latest Ref Range: 0.00 - 0.90 % 0.40   Immature Granulocytes (abs) Latest Ref Range: 0.00 - 0.11 K/uL 0.02   Nucleated RBC Latest Units: /100 WBC 0.00   NRBC (Absolute) Latest Units: K/uL 0.00   Plt Estimation Unknown Normal   RBC Morphology Unknown Present   Anisocytosis Unknown 3+ (A)   Macrocytosis Unknown 3+ (A)   Polychromia Unknown 1+   Comments-Diff Unknown see below   Sodium Latest Ref Range: 135 - 145 mmol/L 138   Potassium Latest Ref Range: 3.6 - 5.5 mmol/L 5.9 (H)   Chloride Latest Ref Range: 96 - 112 mmol/L 93 (L)   Co2 Latest Ref Range: 20 - 33 mmol/L 22   Anion Gap Latest Ref Range: 7.0 - 16.0  23.0 (H)   Glucose Latest Ref Range: 65 - 99 mg/dL 101 (H)   Bun Latest Ref Range: 8 - 22 mg/dL 59 (H)   Creatinine Latest Ref Range: 0.50 - 1.40 mg/dL 8.23 (HH)   GFR If  Latest Ref Range: >60 mL/min/1.73 m 2 8 (A)   GFR If Non  Latest Ref Range: >60 mL/min/1.73 m 2 6 (A)   Calcium Latest Ref Range: 8.4 - 10.2  mg/dL 10.7 (H)   AST(SGOT) Latest Ref Range: 12 - 45 U/L 21   ALT(SGPT) Latest Ref Range: 2 - 50 U/L 14   Alkaline Phosphatase Latest Ref Range: 30 - 99 U/L 132 (H)   Total Bilirubin Latest Ref Range: 0.1 - 1.5 mg/dL 0.6   Albumin Latest Ref Range: 3.2 - 4.9 g/dL 4.0   Total Protein Latest Ref Range: 6.0 - 8.2 g/dL 7.3   Globulin Latest Ref Range: 1.9 - 3.5 g/dL 3.3           IMAGING:  .cxr   Worsening interstitial edema                    IMPRESSION:  - ESRD    * Etiology likely 2/2 HTN    * MWF iHD at [facility]  - Volume Overload/Pulm Edema  - HTN    * Goal BP < 140/90    * Stable  - Anemia of CKD    * Goal Hgb 10-11    * Stable  - CKD-MBD    * Managed at HD unit  - Chronic systolic HF  - HLD  - CAD     PLAN:  - HD today with 3 L UF for volume overload  - MWF iHD during stay and prn  - No dietary protein restrictions  - Dose all meds per ESRD  - Transfuse as needed to maintain Hgb > 7           Thank you for the consultation!      Total CCM> 65 min

## 2020-12-07 NOTE — ASSESSMENT & PLAN NOTE
- Hypotensive with dialysis session. States that sometimes they have to consider slow hemodialysis to achieve fluid removal.   - Required short duration of IV pressors.   - Now weaned off.   - Started on midodrine tid.   - Tolerating the HD session without any problems.

## 2020-12-07 NOTE — ED NOTES
Marco A from Lab called with critical result of Lactic 4.9 at 1243. Critical lab result read back to Marco A.   Dr. Antonio notified of critical lab result at 1243.  Critical lab result read back by Dr. Peters.

## 2020-12-08 ENCOUNTER — APPOINTMENT (OUTPATIENT)
Dept: CARDIOLOGY | Facility: MEDICAL CENTER | Age: 80
DRG: 291 | End: 2020-12-08
Attending: INTERNAL MEDICINE
Payer: MEDICARE

## 2020-12-08 ENCOUNTER — APPOINTMENT (OUTPATIENT)
Dept: RADIOLOGY | Facility: MEDICAL CENTER | Age: 80
DRG: 291 | End: 2020-12-08
Attending: INTERNAL MEDICINE
Payer: MEDICARE

## 2020-12-08 LAB
ANION GAP SERPL CALC-SCNC: 17 MMOL/L (ref 7–16)
BASOPHILS # BLD AUTO: 0.6 % (ref 0–1.8)
BASOPHILS # BLD: 0.04 K/UL (ref 0–0.12)
BUN SERPL-MCNC: 33 MG/DL (ref 8–22)
CALCIUM SERPL-MCNC: 10.2 MG/DL (ref 8.4–10.2)
CHLORIDE SERPL-SCNC: 97 MMOL/L (ref 96–112)
CO2 SERPL-SCNC: 23 MMOL/L (ref 20–33)
CREAT SERPL-MCNC: 5.42 MG/DL (ref 0.5–1.4)
EOSINOPHIL # BLD AUTO: 0.07 K/UL (ref 0–0.51)
EOSINOPHIL NFR BLD: 1.1 % (ref 0–6.9)
ERYTHROCYTE [DISTWIDTH] IN BLOOD BY AUTOMATED COUNT: 73.5 FL (ref 35.9–50)
GLUCOSE SERPL-MCNC: 94 MG/DL (ref 65–99)
HCT VFR BLD AUTO: 26.3 % (ref 42–52)
HGB BLD-MCNC: 7.5 G/DL (ref 14–18)
IMM GRANULOCYTES # BLD AUTO: 0.03 K/UL (ref 0–0.11)
IMM GRANULOCYTES NFR BLD AUTO: 0.5 % (ref 0–0.9)
LACTATE BLD-SCNC: 2.3 MMOL/L (ref 0.5–2)
LACTATE BLD-SCNC: 2.4 MMOL/L (ref 0.5–2)
LYMPHOCYTES # BLD AUTO: 0.75 K/UL (ref 1–4.8)
LYMPHOCYTES NFR BLD: 11.9 % (ref 22–41)
MCH RBC QN AUTO: 31.5 PG (ref 27–33)
MCHC RBC AUTO-ENTMCNC: 28.5 G/DL (ref 33.7–35.3)
MCV RBC AUTO: 110.5 FL (ref 81.4–97.8)
MONOCYTES # BLD AUTO: 0.57 K/UL (ref 0–0.85)
MONOCYTES NFR BLD AUTO: 9 % (ref 0–13.4)
NEUTROPHILS # BLD AUTO: 4.85 K/UL (ref 1.82–7.42)
NEUTROPHILS NFR BLD: 76.9 % (ref 44–72)
NRBC # BLD AUTO: 0 K/UL
NRBC BLD-RTO: 0 /100 WBC
NT-PROBNP SERPL IA-MCNC: ABNORMAL PG/ML (ref 0–125)
PLATELET # BLD AUTO: 217 K/UL (ref 164–446)
PMV BLD AUTO: 10.3 FL (ref 9–12.9)
POTASSIUM SERPL-SCNC: 5 MMOL/L (ref 3.6–5.5)
RBC # BLD AUTO: 2.38 M/UL (ref 4.7–6.1)
SODIUM SERPL-SCNC: 137 MMOL/L (ref 135–145)
WBC # BLD AUTO: 6.3 K/UL (ref 4.8–10.8)

## 2020-12-08 PROCEDURE — 85025 COMPLETE CBC W/AUTO DIFF WBC: CPT

## 2020-12-08 PROCEDURE — 71045 X-RAY EXAM CHEST 1 VIEW: CPT

## 2020-12-08 PROCEDURE — 700101 HCHG RX REV CODE 250: Performed by: INTERNAL MEDICINE

## 2020-12-08 PROCEDURE — 99223 1ST HOSP IP/OBS HIGH 75: CPT | Performed by: INTERNAL MEDICINE

## 2020-12-08 PROCEDURE — 80048 BASIC METABOLIC PNL TOTAL CA: CPT

## 2020-12-08 PROCEDURE — 307059 PAD,EAR PROTECTOR: Performed by: INTERNAL MEDICINE

## 2020-12-08 PROCEDURE — 90935 HEMODIALYSIS ONE EVALUATION: CPT

## 2020-12-08 PROCEDURE — 97597 DBRDMT OPN WND 1ST 20 CM/<: CPT

## 2020-12-08 PROCEDURE — 99291 CRITICAL CARE FIRST HOUR: CPT | Performed by: INTERNAL MEDICINE

## 2020-12-08 PROCEDURE — 770022 HCHG ROOM/CARE - ICU (200)

## 2020-12-08 PROCEDURE — 83605 ASSAY OF LACTIC ACID: CPT

## 2020-12-08 PROCEDURE — 83880 ASSAY OF NATRIURETIC PEPTIDE: CPT

## 2020-12-08 PROCEDURE — A9270 NON-COVERED ITEM OR SERVICE: HCPCS | Performed by: INTERNAL MEDICINE

## 2020-12-08 PROCEDURE — 700111 HCHG RX REV CODE 636 W/ 250 OVERRIDE (IP): Performed by: INTERNAL MEDICINE

## 2020-12-08 PROCEDURE — 700102 HCHG RX REV CODE 250 W/ 637 OVERRIDE(OP): Performed by: INTERNAL MEDICINE

## 2020-12-08 RX ORDER — FOLIC ACID 1 MG/1
1 TABLET ORAL DAILY
Status: DISCONTINUED | OUTPATIENT
Start: 2020-12-08 | End: 2020-12-10 | Stop reason: HOSPADM

## 2020-12-08 RX ORDER — LIDOCAINE HYDROCHLORIDE 10 MG/ML
1 INJECTION, SOLUTION INFILTRATION; PERINEURAL
Status: DISCONTINUED | OUTPATIENT
Start: 2020-12-08 | End: 2020-12-10 | Stop reason: HOSPADM

## 2020-12-08 RX ORDER — MIDODRINE HYDROCHLORIDE 5 MG/1
10 TABLET ORAL
Status: DISCONTINUED | OUTPATIENT
Start: 2020-12-08 | End: 2020-12-10 | Stop reason: HOSPADM

## 2020-12-08 RX ORDER — ALBUMIN (HUMAN) 12.5 G/50ML
12.5 SOLUTION INTRAVENOUS
Status: DISCONTINUED | OUTPATIENT
Start: 2020-12-08 | End: 2020-12-10 | Stop reason: HOSPADM

## 2020-12-08 RX ORDER — ALBUMIN (HUMAN) 12.5 G/50ML
25 SOLUTION INTRAVENOUS
Status: DISCONTINUED | OUTPATIENT
Start: 2020-12-08 | End: 2020-12-10 | Stop reason: HOSPADM

## 2020-12-08 RX ORDER — HEPARIN SODIUM 1000 [USP'U]/ML
1000 INJECTION, SOLUTION INTRAVENOUS; SUBCUTANEOUS
Status: DISCONTINUED | OUTPATIENT
Start: 2020-12-08 | End: 2020-12-10 | Stop reason: HOSPADM

## 2020-12-08 RX ORDER — CHOLECALCIFEROL (VITAMIN D3) 125 MCG
1000 CAPSULE ORAL DAILY
Status: DISCONTINUED | OUTPATIENT
Start: 2020-12-08 | End: 2020-12-10 | Stop reason: HOSPADM

## 2020-12-08 RX ADMIN — LIDOCAINE HYDROCHLORIDE 1 ML: 10 INJECTION, SOLUTION INFILTRATION; PERINEURAL at 16:15

## 2020-12-08 RX ADMIN — HEPARIN SODIUM 1000 UNITS: 1000 INJECTION, SOLUTION INTRAVENOUS; SUBCUTANEOUS at 16:25

## 2020-12-08 RX ADMIN — FLUTICASONE PROPIONATE 100 MCG: 50 SPRAY, METERED NASAL at 05:14

## 2020-12-08 RX ADMIN — Medication 667 MG: at 06:27

## 2020-12-08 RX ADMIN — ROSUVASTATIN CALCIUM 40 MG: 10 TABLET, FILM COATED ORAL at 17:24

## 2020-12-08 RX ADMIN — Medication 667 MG: at 11:57

## 2020-12-08 RX ADMIN — TORSEMIDE 20 MG: 20 TABLET ORAL at 05:23

## 2020-12-08 RX ADMIN — MIDODRINE HYDROCHLORIDE 10 MG: 5 TABLET ORAL at 10:14

## 2020-12-08 RX ADMIN — Medication 667 MG: at 17:23

## 2020-12-08 RX ADMIN — FOLIC ACID 1 MG: 1 TABLET ORAL at 11:56

## 2020-12-08 RX ADMIN — ROPINIROLE HYDROCHLORIDE 1 MG: 0.5 TABLET, FILM COATED ORAL at 22:45

## 2020-12-08 RX ADMIN — MIDODRINE HYDROCHLORIDE 10 MG: 5 TABLET ORAL at 17:24

## 2020-12-08 RX ADMIN — TRAZODONE HYDROCHLORIDE 150 MG: 50 TABLET ORAL at 22:46

## 2020-12-08 RX ADMIN — CYANOCOBALAMIN TAB 500 MCG 1000 MCG: 500 TAB at 11:56

## 2020-12-08 RX ADMIN — OMEPRAZOLE 40 MG: 20 CAPSULE, DELAYED RELEASE ORAL at 05:23

## 2020-12-08 RX ADMIN — MONTELUKAST 10 MG: 10 TABLET, FILM COATED ORAL at 05:23

## 2020-12-08 ASSESSMENT — ENCOUNTER SYMPTOMS
GASTROINTESTINAL NEGATIVE: 1
WEIGHT LOSS: 0
PALPITATIONS: 1
DIZZINESS: 1
MUSCULOSKELETAL NEGATIVE: 1
SHORTNESS OF BREATH: 1
SPUTUM PRODUCTION: 1
DIAPHORESIS: 0
PSYCHIATRIC NEGATIVE: 1
FEVER: 0
CHILLS: 0
WHEEZING: 1

## 2020-12-08 ASSESSMENT — FIBROSIS 4 INDEX: FIB4 SCORE: 2.02

## 2020-12-08 ASSESSMENT — PAIN DESCRIPTION - PAIN TYPE: TYPE: ACUTE PAIN

## 2020-12-08 NOTE — PROGRESS NOTES
Assumed care of patient.  VSS, patient denies pain.  Safety precautions in place, no needs at this time.

## 2020-12-08 NOTE — PROGRESS NOTES
Notified Dr Santillan that patient BP 70s/40s after levophed paused.  Received order for midodrine, see MAR.  Per Dr Santillan okay to run levo through PIV if needed during HD.

## 2020-12-08 NOTE — PROGRESS NOTES
"     [facility] Progress Note           CHIEF COMPLAINT:   - Shortness of Breath     HISTORY OF PRESENT ILLNESS:    81 yo M PMH ESRD MWF iHD, HTN, anemia of CKD, and CKD-MBD who presents to ED with CC as above. He denies Covid exposure.  He normally dialyzes at [facility]. Nephrology was asked to consult for ESRD, HD management and maintenance respiectively. Pt is currently SOB and on BiPAP. Serum K+ is 5.9. CXR with pulm edema.    Daily Nephrology Summary:  12/8 - S/p HD yesterday and tolerated well. Feels good but still weak and poor exercise tolerance. 3L UF. ECHO from march 2020 Normal left ventricular chamber size. Normal left ventricular wall thickness. Normal left ventricular systolic function. Left ventricular ejection fraction is visually estimated to be 65%. Abnormal septal motion. Grade II diastolic dysfunction.      REVIEW OF SYSTEMS:    All systems were reviewed and are negative except for what's mentioned above.     PAST MEDICAL HISTORY:   - ESRD MWF iHD  - HTN  - Anemia of CKD  - CKD-MBD  - HLD  - CAD  - COPD  - Glaucoma  - AFlutte  - GERD  - PVD  - HELGA  - Chronic systolic HF, EF 30%     PAST SURGICAL HISTORY:   - AVF creation  - Multiple AVF revisions and fistulagrams  - Vitrectomy multiple times  - Gastroscopy  - C  - BLE angiogram  - Fem-Pop bypass  - Capsule endoscopy  - Colonoscopy     FAMILY HISTORY:   - Reviewed and non contributory to current illness     SOCIAL HISTORY:   - No tobacco, quit 11 years ago  - No EtOH  - No illicits     HOME MEDICATIONS:   - Reviewed and documented in chart     LABORATORY STUDIES:   - Reviewed and documented in chart     ALLERGIES:  Patient has no known allergies.     PHYSICAL EXAM:  VS:  /61   Pulse 83   Temp 36.7 °C (98 °F) (Temporal)   Resp (!) 22   Ht 1.727 m (5' 8\")   Wt 76.3 kg (168 lb 3.4 oz)   SpO2 100%   BMI 25.58 kg/m²   GEN: WDWN Shortness of Breath  HENT: NC/AT; atraumatic external nose  EYES: No " icterus  CVS: RRR; No m/r  RESP: clear left lung field and poor right lung field. No chest wall tenderness to palpation  GI: SNTND; +BS  MSK: No C/C  SKIN: No rash     LABS:  Results for JAKE GREEN (MRN [id number]) as of 12/8/2020 08:17   Ref. Range 12/8/2020 04:05   Sodium Latest Ref Range: 135 - 145 mmol/L 137   Potassium Latest Ref Range: 3.6 - 5.5 mmol/L 5.0   Chloride Latest Ref Range: 96 - 112 mmol/L 97   Co2 Latest Ref Range: 20 - 33 mmol/L 23   Anion Gap Latest Ref Range: 7.0 - 16.0  17.0 (H)   Glucose Latest Ref Range: 65 - 99 mg/dL 94   Bun Latest Ref Range: 8 - 22 mg/dL 33 (H)   Creatinine Latest Ref Range: 0.50 - 1.40 mg/dL 5.42 (HH)   GFR If  Latest Ref Range: >60 mL/min/1.73 m 2 12 (A)   GFR If Non  Latest Ref Range: >60 mL/min/1.73 m 2 10 (A)   Calcium Latest Ref Range: 8.4 - 10.2 mg/dL 10.2   Lactic Acid Latest Ref Range: 0.5 - 2.0 mmol/L 2.4 (H)       IMPRESSION:  - ESRD    * Etiology likely 2/2 HTN    * MWF iHD at [facility]  - Volume Overload/Pulm Edema  - HTN    * Goal BP < 140/90    * Stable  - Anemia of CKD    * Goal Hgb 10-11    * Stable  - CKD-MBD    * Managed at HD unit  - Chronic systolic HF  - HLD  - CAD     PLAN:  - PUF today for 2 hrs.   - CXR today. ? Need for thoracentesis  - MWF iHD during stay and prn  - No dietary protein restrictions  - Dose all meds per ESRD  - Transfuse as needed to maintain Hgb > 7

## 2020-12-09 ENCOUNTER — APPOINTMENT (OUTPATIENT)
Dept: CARDIOLOGY | Facility: MEDICAL CENTER | Age: 80
DRG: 291 | End: 2020-12-09
Attending: INTERNAL MEDICINE
Payer: MEDICARE

## 2020-12-09 PROBLEM — I83.013 VENOUS STASIS ULCER OF RIGHT ANKLE LIMITED TO BREAKDOWN OF SKIN (HCC): Status: ACTIVE | Noted: 2020-12-09

## 2020-12-09 PROBLEM — L97.311 VENOUS STASIS ULCER OF RIGHT ANKLE LIMITED TO BREAKDOWN OF SKIN (HCC): Status: ACTIVE | Noted: 2020-12-09

## 2020-12-09 PROBLEM — I50.33 ACUTE ON CHRONIC HEART FAILURE WITH PRESERVED EJECTION FRACTION (HCC): Status: ACTIVE | Noted: 2020-12-09

## 2020-12-09 LAB
ANION GAP SERPL CALC-SCNC: 18 MMOL/L (ref 7–16)
BUN SERPL-MCNC: 55 MG/DL (ref 8–22)
CALCIUM SERPL-MCNC: 10.5 MG/DL (ref 8.4–10.2)
CHLORIDE SERPL-SCNC: 98 MMOL/L (ref 96–112)
CO2 SERPL-SCNC: 23 MMOL/L (ref 20–33)
CREAT SERPL-MCNC: 7.4 MG/DL (ref 0.5–1.4)
ERYTHROCYTE [DISTWIDTH] IN BLOOD BY AUTOMATED COUNT: 73.7 FL (ref 35.9–50)
GLUCOSE SERPL-MCNC: 143 MG/DL (ref 65–99)
HCT VFR BLD AUTO: 26.4 % (ref 42–52)
HGB BLD-MCNC: 7.7 G/DL (ref 14–18)
LV EJECT FRACT  99904: 35
LV EJECT FRACT MOD 2C 99903: 26.26
LV EJECT FRACT MOD 4C 99902: 31.28
LV EJECT FRACT MOD BP 99901: 31.65
MCH RBC QN AUTO: 32 PG (ref 27–33)
MCHC RBC AUTO-ENTMCNC: 29.2 G/DL (ref 33.7–35.3)
MCV RBC AUTO: 109.5 FL (ref 81.4–97.8)
PLATELET # BLD AUTO: 220 K/UL (ref 164–446)
PMV BLD AUTO: 10.1 FL (ref 9–12.9)
POTASSIUM SERPL-SCNC: 4.2 MMOL/L (ref 3.6–5.5)
RBC # BLD AUTO: 2.41 M/UL (ref 4.7–6.1)
SODIUM SERPL-SCNC: 139 MMOL/L (ref 135–145)
WBC # BLD AUTO: 3.9 K/UL (ref 4.8–10.8)

## 2020-12-09 PROCEDURE — 700101 HCHG RX REV CODE 250: Performed by: INTERNAL MEDICINE

## 2020-12-09 PROCEDURE — A9270 NON-COVERED ITEM OR SERVICE: HCPCS | Performed by: INTERNAL MEDICINE

## 2020-12-09 PROCEDURE — 99233 SBSQ HOSP IP/OBS HIGH 50: CPT | Performed by: STUDENT IN AN ORGANIZED HEALTH CARE EDUCATION/TRAINING PROGRAM

## 2020-12-09 PROCEDURE — 700111 HCHG RX REV CODE 636 W/ 250 OVERRIDE (IP): Performed by: INTERNAL MEDICINE

## 2020-12-09 PROCEDURE — 93306 TTE W/DOPPLER COMPLETE: CPT

## 2020-12-09 PROCEDURE — 93306 TTE W/DOPPLER COMPLETE: CPT | Mod: 26 | Performed by: INTERNAL MEDICINE

## 2020-12-09 PROCEDURE — 90935 HEMODIALYSIS ONE EVALUATION: CPT

## 2020-12-09 PROCEDURE — 700102 HCHG RX REV CODE 250 W/ 637 OVERRIDE(OP): Performed by: INTERNAL MEDICINE

## 2020-12-09 PROCEDURE — 85027 COMPLETE CBC AUTOMATED: CPT

## 2020-12-09 PROCEDURE — 770020 HCHG ROOM/CARE - TELE (206)

## 2020-12-09 PROCEDURE — 80048 BASIC METABOLIC PNL TOTAL CA: CPT

## 2020-12-09 PROCEDURE — P9047 ALBUMIN (HUMAN), 25%, 50ML: HCPCS | Performed by: INTERNAL MEDICINE

## 2020-12-09 RX ORDER — DOCUSATE SODIUM 100 MG/1
100 CAPSULE, LIQUID FILLED ORAL 2 TIMES DAILY PRN
Status: DISCONTINUED | OUTPATIENT
Start: 2020-12-09 | End: 2020-12-10 | Stop reason: HOSPADM

## 2020-12-09 RX ORDER — POLYETHYLENE GLYCOL 3350 17 G/17G
1 POWDER, FOR SOLUTION ORAL 2 TIMES DAILY PRN
Status: DISCONTINUED | OUTPATIENT
Start: 2020-12-09 | End: 2020-12-10 | Stop reason: HOSPADM

## 2020-12-09 RX ORDER — AMOXICILLIN 250 MG
1 CAPSULE ORAL
Status: DISCONTINUED | OUTPATIENT
Start: 2020-12-09 | End: 2020-12-10 | Stop reason: HOSPADM

## 2020-12-09 RX ADMIN — FLUTICASONE PROPIONATE 100 MCG: 50 SPRAY, METERED NASAL at 06:04

## 2020-12-09 RX ADMIN — Medication 667 MG: at 06:04

## 2020-12-09 RX ADMIN — CYANOCOBALAMIN TAB 500 MCG 1000 MCG: 500 TAB at 06:04

## 2020-12-09 RX ADMIN — MONTELUKAST 10 MG: 10 TABLET, FILM COATED ORAL at 06:04

## 2020-12-09 RX ADMIN — MIDODRINE HYDROCHLORIDE 10 MG: 5 TABLET ORAL at 11:25

## 2020-12-09 RX ADMIN — FLUTICASONE PROPIONATE 100 MCG: 50 SPRAY, METERED NASAL at 17:16

## 2020-12-09 RX ADMIN — ROSUVASTATIN CALCIUM 40 MG: 10 TABLET, FILM COATED ORAL at 17:16

## 2020-12-09 RX ADMIN — TRAZODONE HYDROCHLORIDE 150 MG: 50 TABLET ORAL at 20:30

## 2020-12-09 RX ADMIN — HEPARIN SODIUM 1500 UNITS: 1000 INJECTION, SOLUTION INTRAVENOUS; SUBCUTANEOUS at 16:11

## 2020-12-09 RX ADMIN — LIDOCAINE HYDROCHLORIDE 1 ML: 10 INJECTION, SOLUTION INFILTRATION; PERINEURAL at 13:05

## 2020-12-09 RX ADMIN — ALBUMIN (HUMAN) 12.5 G: 0.25 INJECTION, SOLUTION INTRAVENOUS at 16:50

## 2020-12-09 RX ADMIN — EPOETIN ALFA-EPBX 10000 UNITS: 10000 INJECTION, SOLUTION INTRAVENOUS; SUBCUTANEOUS at 16:49

## 2020-12-09 RX ADMIN — TORSEMIDE 20 MG: 20 TABLET ORAL at 06:04

## 2020-12-09 RX ADMIN — TAMSULOSIN HYDROCHLORIDE 0.8 MG: 0.4 CAPSULE ORAL at 17:17

## 2020-12-09 RX ADMIN — Medication 667 MG: at 11:25

## 2020-12-09 RX ADMIN — Medication 667 MG: at 17:17

## 2020-12-09 RX ADMIN — MIDODRINE HYDROCHLORIDE 10 MG: 5 TABLET ORAL at 08:23

## 2020-12-09 RX ADMIN — MIDODRINE HYDROCHLORIDE 10 MG: 5 TABLET ORAL at 17:17

## 2020-12-09 RX ADMIN — OMEPRAZOLE 40 MG: 20 CAPSULE, DELAYED RELEASE ORAL at 06:04

## 2020-12-09 RX ADMIN — ALBUMIN (HUMAN) 25 G: 12.5 SOLUTION INTRAVENOUS at 14:06

## 2020-12-09 RX ADMIN — FOLIC ACID 1 MG: 1 TABLET ORAL at 06:04

## 2020-12-09 RX ADMIN — ROPINIROLE HYDROCHLORIDE 1 MG: 0.5 TABLET, FILM COATED ORAL at 20:29

## 2020-12-09 RX ADMIN — HEPARIN SODIUM 1000 UNITS: 1000 INJECTION, SOLUTION INTRAVENOUS; SUBCUTANEOUS at 15:30

## 2020-12-09 ASSESSMENT — ENCOUNTER SYMPTOMS
DIARRHEA: 0
HEARTBURN: 0
SHORTNESS OF BREATH: 1
HEADACHES: 0
WHEEZING: 0
NAUSEA: 0
SPUTUM PRODUCTION: 0
ORTHOPNEA: 1
PSYCHIATRIC NEGATIVE: 1
EYES NEGATIVE: 1
ABDOMINAL PAIN: 0
DIZZINESS: 0
CHILLS: 0
GASTROINTESTINAL NEGATIVE: 1
COUGH: 0
BLURRED VISION: 0
NEUROLOGICAL NEGATIVE: 1
NECK PAIN: 0
MUSCULOSKELETAL NEGATIVE: 1
FEVER: 0
DOUBLE VISION: 0
BRUISES/BLEEDS EASILY: 0
DEPRESSION: 0
MYALGIAS: 0

## 2020-12-09 ASSESSMENT — FIBROSIS 4 INDEX: FIB4 SCORE: 2.02

## 2020-12-09 ASSESSMENT — PAIN DESCRIPTION - PAIN TYPE: TYPE: ACUTE PAIN

## 2020-12-09 NOTE — DISCHARGE PLANNING
Outpatient Dialysis Note    Confirmed patient is active at:    [facility]  [address]  [place] [address]    Schedule: Monday, Wednesday, Friday   Time: 1:30pm    Patient is seen by Dr. Pisano in HD clinic.    Spoke with Wen at facility who confirmed.    Forwarded records for review.    Kate Mei- Dialysis Coordinator  [facility] # [phone]

## 2020-12-09 NOTE — ASSESSMENT & PLAN NOTE
Hypotensive during dialysis, seems to be chronic issue  Continue midodrine started on this admission  Required short run of levophed prior to midodrine during this admission

## 2020-12-09 NOTE — ASSESSMENT & PLAN NOTE
ESRD on HD MWF, compliant with HD  Volume overloaded on admission, has improved with dialysis  Patient started on midodrine for BP support with dialysis  Appreciate nephrology consult  Continue dialysis MWF  Dialysis 12/9

## 2020-12-09 NOTE — PROGRESS NOTES
Critical Care Progress Note    Date of admission  12/7/2020    Chief Complaint  80 y.o. male admitted 12/7/2020 with volume overload and severe respiratory distress.     Hospital Course  12/7/20: Admitted with severe volume overload. No missed session of HD. Commenced on HD. Started on BiPAP.  Required pressors support during HD.     12/8/20: Weaned off BiPAP and pressors. Continued on midodrine.      Interval Problem Update  Reviewed last 24 hour events:  - Patients RR and respiratory distress considerably improved.   - Undergoing daily HD.   - Started on midodrine.      Review of Systems  Review of Systems   Constitutional: Negative for chills, diaphoresis, fever and weight loss.   Respiratory: Positive for sputum production, shortness of breath and wheezing.    Cardiovascular: Positive for palpitations.   Gastrointestinal: Negative.    Genitourinary: Negative.    Musculoskeletal: Negative.    Neurological: Positive for dizziness.   Psychiatric/Behavioral: Negative.         Vital Signs for last 24 hours   Temp:  [36.6 °C (97.8 °F)-36.9 °C (98.5 °F)] 36.6 °C (97.8 °F)  Pulse:  [] 92  Resp:  [13-79] 28  BP: ()/(44-71) 98/50  SpO2:  [75 %-100 %] 95 %    Hemodynamic parameters for last 24 hours       Respiratory Information for the last 24 hours       Physical Exam   Physical Exam  Constitutional:       Appearance: Normal appearance.   HENT:      Head: Normocephalic and atraumatic.      Nose: Nose normal.      Mouth/Throat:      Mouth: Mucous membranes are moist.   Eyes:      Pupils: Pupils are equal, round, and reactive to light.   Neck:      Musculoskeletal: Normal range of motion.   Cardiovascular:      Rate and Rhythm: Normal rate.      Pulses: Normal pulses.      Heart sounds: Normal heart sounds.   Pulmonary:      Effort: Pulmonary effort is normal.      Breath sounds: Wheezing present.   Abdominal:      General: Abdomen is flat.      Palpations: Abdomen is soft.   Musculoskeletal: Normal range of  motion.   Skin:     Capillary Refill: Capillary refill takes 2 to 3 seconds.   Neurological:      Mental Status: He is alert.         Medications  Current Facility-Administered Medications   Medication Dose Route Frequency Provider Last Rate Last Admin   • midodrine (PROAMATINE) tablet 10 mg  10 mg Oral TID WITH MEALS Steven Santillan M.D.   10 mg at 12/08/20 1014   • folic acid (FOLVITE) tablet 1 mg  1 mg Oral DAILY Steven Santillan M.D.   1 mg at 12/08/20 1156   • cyanocobalamin (VITAMIN B-12) tablet 1,000 mcg  1,000 mcg Oral DAILY Steven Santillan M.D.   1,000 mcg at 12/08/20 1156   • lidocaine (XYLOCAINE) 1%  injection  1 mL Intradermal ACUTE DIALYSIS PRN Elliot Enriquez M.D.       • heparin injection 1,000 Units  1,000 Units Intravenous ACUTE DIALYSIS PRN Elliot Enriquez M.D.       • albumin human 25% solution 25 g  25 g Intravenous ACUTE DIALYSIS PRN Elliot Enriquez M.D.       • albumin human 25% solution 12.5 g  12.5 g Intravenous ACUTE DIALYSIS PRN Elliot Enriquez M.D.       • albuterol inhaler 2 Puff  2 Puff Inhalation Q4HRS PRN Steven Santillan M.D.       • calcium acetate (PHOS-LO) 667 MG tablet 667 mg  667 mg Oral TID AC Steven Santillan M.D.   667 mg at 12/08/20 1157   • fluticasone (FLONASE) nasal spray 100 mcg  2 Spray Nasal BID Steven Santillan M.D.   100 mcg at 12/08/20 0514   • montelukast (SINGULAIR) tablet 10 mg  10 mg Oral DAILY Steven Santillan M.D.   10 mg at 12/08/20 0523   • omeprazole (PRILOSEC) capsule 40 mg  40 mg Oral DAILY Steven Santillan M.D.   40 mg at 12/08/20 0523   • ROPINIRole (REQUIP) tablet 1 mg  1 mg Oral QHS Steven Santillan M.D.   1 mg at 12/07/20 2240   • rosuvastatin (CRESTOR) tablet 40 mg  40 mg Oral Q EVENING Steven Santillan M.D.   40 mg at 12/07/20 1716   • tamsulosin (FLOMAX) capsule 0.8 mg  0.8 mg Oral Q EVENING Steven Santillan M.D.       • torsemide (DEMADEX) tablet 20 mg  20 mg Oral BID DIURETIC Steven Santillan M.D.   20 mg at 12/08/20  0523   • heparin injection 1,500 Units  1,500 Units Intravenous DIALYSIS PRN Elliot Enriquez M.D.       • norepinephrine (Levophed) infusion 8 mg/250 mL (premix)  0-30 mcg/min Intravenous Continuous Steven Santillan M.D.   Stopped at 12/08/20 1015   • traZODone (DESYREL) tablet 150 mg  150 mg Oral BID Steven Santillan M.D.   150 mg at 12/07/20 2240       Fluids    Intake/Output Summary (Last 24 hours) at 12/8/2020 1650  Last data filed at 12/8/2020 0800  Gross per 24 hour   Intake 910.56 ml   Output 3300 ml   Net -2389.44 ml       Laboratory          Recent Labs     12/07/20  0947 12/08/20  0405   SODIUM 138 137   POTASSIUM 5.9* 5.0   CHLORIDE 93* 97   CO2 22 23   BUN 59* 33*   CREATININE 8.23* 5.42*   CALCIUM 10.7* 10.2     Recent Labs     12/07/20  0947 12/08/20  0405   ALTSGPT 14  --    ASTSGOT 21  --    ALKPHOSPHAT 132*  --    TBILIRUBIN 0.6  --    GLUCOSE 101* 94     Recent Labs     12/07/20  0947 12/08/20  0405   WBC 5.7 6.3   NEUTSPOLYS 87.90* 76.90*   LYMPHOCYTES 6.20* 11.90*   MONOCYTES 4.80 9.00   EOSINOPHILS 0.50 1.10   BASOPHILS 0.20 0.60   ASTSGOT 21  --    ALTSGPT 14  --    ALKPHOSPHAT 132*  --    TBILIRUBIN 0.6  --      Recent Labs     12/07/20  0947 12/08/20  0405   RBC 2.31* 2.38*   HEMOGLOBIN 7.4* 7.5*   HEMATOCRIT 26.1* 26.3*   PLATELETCT 237 217       Imaging  X-Ray:  I have personally reviewed the images and compared with prior images.    Assessment/Plan  ESRD (end stage renal disease) on dialysis (HCC)- (present on admission)  Assessment & Plan  - Patient appears in volume overload - last HD session was on Fri.   - Compliant with his HD.   - Known history of HFpEF (Grade II diastolic dysfunction).   - Likely a combination of effect.   - Underwent X2 sessions of dialysis. Hemodynamics and volume status improved.   - Discussed with Dr Enriquez.     Hemodialysis-associated hypotension  Assessment & Plan  - Hypotensive with dialysis session. States that sometimes they have to consider slow  hemodialysis to achieve fluid removal.   - Required short duration of IV pressors.   - Now weaned off.   - Started on midodrine tid.   - Tolerating the HD session without any problems.    PAD (peripheral artery disease) ([facility])- (present on admission)  Assessment & Plan  Consult wound care for non healing small ulcer in right ankle    Coronary artery disease- (present on admission)  Assessment & Plan  Stable.  No acute issues.    Restless leg syndrome- (present on admission)  Assessment & Plan  - Continue home medications.     COPD (chronic obstructive pulmonary disease) ([facility])- (present on admission)  Assessment & Plan  - Well controlled.  - No acute issues.     HELGA (obstructive sleep apnea)- (present on admission)  Assessment & Plan  - Will consider NIV at night.   - Patient tolerating it OK.        VTE:  Heparin  Ulcer: H2 Antagonist  Lines: None    I have performed a physical exam and reviewed and updated ROS and Plan today (12/8/2020). In review of yesterday's note (12/7/2020), there are no changes except as documented above.     Discussed patient condition and risk of morbidity and/or mortality with Family, RN, Therapies and Pharmacy  The patient remains critically ill.  Critical care time = 64 minutes in directly providing and coordinating critical care and extensive data review.  No time overlap and excludes procedures.

## 2020-12-09 NOTE — ASSESSMENT & PLAN NOTE
Patient with worsening dyspnea on exertion and orthopnea prior to admission  ECHO from 3/2020 with grade II diastolic dysfunction  Likely volume overload due to inadequate dialysis 2/2 hypotension worsening volume status in patient with HFpEF  He is aneuric, difficult to manage volumes  ECHO pending  Appreciate cardiology consult

## 2020-12-09 NOTE — ASSESSMENT & PLAN NOTE
Moderate stenosis on prior ECHO 3/2020  Was told by cardiologist he is not candidate for TAVR  Will repeat ECHO  Appreciate cardiology consult

## 2020-12-09 NOTE — PROGRESS NOTES
Hospital Medicine Daily Progress Note    Date of Service  12/9/2020    Chief Complaint  80 y.o. male admitted 12/7/2020 with shortness of breath    Hospital Course  80M with PMHx of ESRD on HD (MWF), CAD, PAD, HFpEF (grade II diastolic dysfunction), moderate AS, COPD, Anemia, GIB due to AVM and gastritis. Patient presented to ED with worsening SOB. Reported significant dyspnea on exertion and orthopnea. Patient has been compliant with dialysis, last dialysis prior to admission on Friday. Patient denied any known COVID exposure or infectious symptoms. He endorses worsening exercise tolerance and can only walk few steps before he needs to rest. Patient does report that he frequently is hypotensive during dialysis which sometimes limits his length and amount of fluid they are able to remove.    On admission he was noted to be volume overloaded and was admitted to ICU for BiPAP. He underwent dialysis 12/7 and 12/8 with fluid removal and required levophed support to complete. While in ICU he was started on midodrine and levophed was discontinued.    Patient was weaned off BiPAP 12/8 and was transferred to the floor 12/9. Patient was evaluated by cardiology who recommended continuing midodrine and home medications. Follow up with his outside cardiologist.    Echo was ordered and pending.      Interval Problem Update  Patient feeling much better today. Appears more euvolemic. Sitting in chair and off oxygen. Is concerned about his heart function, but reports that he is feeling near baseline. Remains with dyspnea on exertion.    Dialysis today. ECHO pending.  Will discuss case with patient and patient's wife at bedside this afternoon in addition to discussing code status.    Consultants/Specialty  Nephrology  Cardiology  Critical Care    Code Status  Prior    Disposition  TBD  Likely home    Review of Systems  Review of Systems   Constitutional: Positive for malaise/fatigue. Negative for chills and fever.   HENT: Negative.   Negative for hearing loss and tinnitus.    Eyes: Negative.  Negative for blurred vision and double vision.   Respiratory: Positive for shortness of breath. Negative for cough, sputum production and wheezing.    Cardiovascular: Positive for orthopnea and leg swelling. Negative for chest pain.   Gastrointestinal: Negative.  Negative for abdominal pain, diarrhea, heartburn and nausea.   Genitourinary: Negative.  Negative for dysuria and urgency.   Musculoskeletal: Negative.  Negative for myalgias and neck pain.   Skin: Negative.  Negative for rash.   Neurological: Negative.  Negative for dizziness and headaches.   Endo/Heme/Allergies: Negative.  Does not bruise/bleed easily.   Psychiatric/Behavioral: Negative.  Negative for depression and suicidal ideas.        Physical Exam  Temp:  [36.1 °C (97 °F)-37 °C (98.6 °F)] 36.3 °C (97.4 °F)  Pulse:  [68-98] 91  Resp:  [11-40] 40  BP: ()/(46-66) 104/66  SpO2:  [90 %-100 %] 95 %    Physical Exam  Constitutional:       General: He is not in acute distress.  HENT:      Head: Normocephalic.      Nose: Nose normal.      Mouth/Throat:      Mouth: Mucous membranes are moist.      Pharynx: Oropharynx is clear.   Eyes:      Conjunctiva/sclera: Conjunctivae normal.      Pupils: Pupils are equal, round, and reactive to light.   Neck:      Musculoskeletal: Normal range of motion and neck supple. No neck rigidity.   Cardiovascular:      Rate and Rhythm: Normal rate and regular rhythm.      Heart sounds: Murmur present.   Pulmonary:      Effort: Pulmonary effort is normal.      Breath sounds: No wheezing or rales.      Comments: Decreased breath sounds at right base  Abdominal:      General: Abdomen is flat. There is no distension.      Palpations: Abdomen is soft.      Tenderness: There is no abdominal tenderness.   Musculoskeletal: Normal range of motion.      Right lower leg: No edema.      Left lower leg: No edema.   Skin:     Capillary Refill: Capillary refill takes less than 2  seconds.      Comments: Right leg lateral malleolus venous stasis ulcer, healing   Neurological:      General: No focal deficit present.      Mental Status: He is alert and oriented to person, place, and time.   Psychiatric:         Mood and Affect: Mood normal.         Behavior: Behavior normal.         Fluids    Intake/Output Summary (Last 24 hours) at 12/9/2020 1347  Last data filed at 12/8/2020 2000  Gross per 24 hour   Intake 860 ml   Output 3500 ml   Net -2640 ml       Laboratory  Recent Labs     12/07/20  0947 12/08/20  0405 12/09/20  1033   WBC 5.7 6.3 3.9*   RBC 2.31* 2.38* 2.41*   HEMOGLOBIN 7.4* 7.5* 7.7*   HEMATOCRIT 26.1* 26.3* 26.4*   .0* 110.5* 109.5*   MCH 32.0 31.5 32.0   MCHC 28.4* 28.5* 29.2*   RDW 73.4* 73.5* 73.7*   PLATELETCT 237 217 220   MPV 10.6 10.3 10.1     Recent Labs     12/07/20  0947 12/08/20  0405 12/09/20  1033   SODIUM 138 137 139   POTASSIUM 5.9* 5.0 4.2   CHLORIDE 93* 97 98   CO2 22 23 23   GLUCOSE 101* 94 143*   BUN 59* 33* 55*   CREATININE 8.23* 5.42* 7.40*   CALCIUM 10.7* 10.2 10.5*                   Imaging  EC-ECHOCARDIOGRAM COMPLETE W/O CONT   Final Result      DX-CHEST-PORTABLE (1 VIEW)   Final Result      Unchanged probable interstitial edema and mild bilateral atelectasis      DX-CHEST-PORTABLE (1 VIEW)   Final Result      Worsening interstitial edema, stable small right effusion      Stable cardiac silhouette enlargement      Resolved left basilar atelectasis           Assessment/Plan  * ESRD (end stage renal disease) on dialysis (HCC)- (present on admission)  Assessment & Plan  ESRD on HD MWF, compliant with HD  Volume overloaded on admission, has improved with dialysis  Patient started on midodrine for BP support with dialysis  Appreciate nephrology consult  Continue dialysis MWF  Dialysis 12/9    Hemodialysis-associated hypotension- (present on admission)  Assessment & Plan  Hypotensive during dialysis, seems to be chronic issue  Continue midodrine started on  this admission  Required short run of levophed prior to midodrine during this admission    Aortic stenosis- (present on admission)  Assessment & Plan  Moderate stenosis on prior ECHO 3/2020  Was told by cardiologist he is not candidate for TAVR  Will repeat ECHO  Appreciate cardiology consult    PAD (peripheral artery disease) ([facility])- (present on admission)  Assessment & Plan  Appreciate wound care consult  Small healing venous ulcer lateral malleolus    Coronary artery disease- (present on admission)  Assessment & Plan  Stable  No chest pain      Acute on chronic heart failure with preserved ejection fraction ([facility])  Assessment & Plan  Patient with worsening dyspnea on exertion and orthopnea prior to admission  ECHO from 3/2020 with grade II diastolic dysfunction  Likely volume overload due to inadequate dialysis 2/2 hypotension worsening volume status in patient with HFpEF  He is aneuric, difficult to manage volumes  ECHO pending  Appreciate cardiology consult    Venous stasis ulcer of right ankle limited to breakdown of skin ([facility])- (present on admission)  Assessment & Plan  Present on admission  Scab debrided by wound care  Appears to be healing  Continue wound care    Restless leg syndrome- (present on admission)  Assessment & Plan  Continue home medications    COPD (chronic obstructive pulmonary disease) ([facility])- (present on admission)  Assessment & Plan  Not in acute exacerbation  Well controlled  Continue albuterol prn, Flonase, and Singulair      HELGA (obstructive sleep apnea)- (present on admission)  Assessment & Plan  NIV at night       VTE prophylaxis: Held due to history of GIB and current anemia.

## 2020-12-09 NOTE — HOSPITAL COURSE
80M with PMHx of ESRD on HD (MWF), CAD, PAD, HFpEF (grade II diastolic dysfunction), moderate AS, COPD, Anemia, GIB due to AVM and gastritis. Patient presented to ED with worsening SOB. Reported significant dyspnea on exertion and orthopnea. Patient has been compliant with dialysis, last dialysis prior to admission on Friday. Patient denied any known COVID exposure or infectious symptoms. He endorses worsening exercise tolerance and can only walk few steps before he needs to rest. Patient does report that he frequently is hypotensive during dialysis which sometimes limits his length and amount of fluid they are able to remove.    On admission he was noted to be volume overloaded and was admitted to ICU for BiPAP. He underwent dialysis 12/7 and 12/8 with fluid removal and required levophed support to complete. While in ICU he was started on midodrine and levophed was discontinued.    Patient was weaned off BiPAP 12/8 and was transferred to the floor 12/9 where he had an additional run of dialysis. ECHO showed progression of heart failure to HFrEF (LVEF 35%) and with global hypokinesis and severe aortic stenosis. Cardiology evaluated patient and recommend continuing midodrine. Patient cannot be started on ACE, BBLocker, or Spironolactone due to hypotension which is limiting effectiveness of dialysis. Cardiology thinks this may be progressing to end stage disease. Patient would like to follow up with Dr. Black as outpatient.    Discussion was had with patient regarding goals of care. Patient has POLST with DNR, trial of DNI. Palliative care was consulted, however he wants to remain on dialysis and is not ready for hospice. PT evaluated and he would benefit from home health and home PT. Referral placed, however he wanted to be discharged home prior to confirmation. Patient was informed that he can follow up with PCP for home health referral.    Patient felt better on day of discharge. He was able to ambulate with PT  and was off oxygen. Patient wanted to be discharged. He will be discharged with midodrine and will follow up with his dialysis center tomorrow for his regularly scheduled dialysis.

## 2020-12-09 NOTE — PROGRESS NOTES
[facility] Progress Note     HD/PUF treatment ordered by Dr Enriquez x 2 hours.  Treatment Start time: 1629          End time: 1829       Net UF 3000 ml    Patient tolerated treatment. VS stable all through out, except on last 5 mins of Tx, SBP dropped to 70's, slightly lightheaded. All blood was returned. RUE AVF needle removed. Dry gauze dressing applied and changed without bleeding issue. + Bruit/Thrill pre-post Treatment. See paper flow sheet for details.     Report given to ROCHELLE joe.

## 2020-12-09 NOTE — ASSESSMENT & PLAN NOTE
Moderate stenosis on prior ECHO 3/2020  Was told by cardiologist he is not candidate for TAVR  Will repeat ECHO

## 2020-12-09 NOTE — PROGRESS NOTES
Patient transferred to [facility]. Patient sitting up in chair. Patient is A&O X4. Denies pain, nausea, constipation, and diarrhea. This RN agrees with previous assessment done. Oriented to room with call bell within reach. Plan of care discussed and encouraged to call for needs. Patient to have dialysis later today.

## 2020-12-09 NOTE — PROGRESS NOTES
"     [facility] Progress Note           CHIEF COMPLAINT:   - Shortness of Breath     HISTORY OF PRESENT ILLNESS:    79 yo M PMH ESRD MWF iHD, HTN, anemia of CKD, and CKD-MBD who presents to ED with CC as above. He denies Covid exposure.  He normally dialyzes at [facility]. Nephrology was asked to consult for ESRD, HD management and maintenance respiectively. Pt is currently SOB and on BiPAP. Serum K+ is 5.9. CXR with pulm edema.    Daily Nephrology Summary:  12/8 - S/p HD yesterday and tolerated well. Feels good but still weak and poor exercise tolerance. 3L UF. ECHO from march 2020 Normal left ventricular chamber size. Normal left ventricular wall thickness. Normal left ventricular systolic function. Left ventricular ejection fraction is visually estimated to be 65%. Abnormal septal motion. Grade II diastolic dysfunction.  12/9 - S/p PUF with 3 L successfully removed. Feels better. CXR from yesterday showed interstitial edema.    REVIEW OF SYSTEMS:    All systems were reviewed and are negative except for what's mentioned above.     PAST MEDICAL HISTORY:   - ESRD MWF iHD  - HTN  - Anemia of CKD  - CKD-MBD  - HLD  - CAD  - COPD  - Glaucoma  - AFlutte  - GERD  - PVD  - HELGA  - Chronic systolic HF, EF 30%     PAST SURGICAL HISTORY:   - AVF creation  - Multiple AVF revisions and fistulagrams  - Vitrectomy multiple times  - Gastroscopy  - [facility]  - BLE angiogram  - Fem-Pop bypass  - Capsule endoscopy  - Colonoscopy     FAMILY HISTORY:   - Reviewed and non contributory to current illness     SOCIAL HISTORY:   - No tobacco, quit 11 years ago  - No EtOH  - No illicits     HOME MEDICATIONS:   - Reviewed and documented in chart     LABORATORY STUDIES:   - Reviewed and documented in chart     ALLERGIES:  Patient has no known allergies.     PHYSICAL EXAM:  VS:  /61   Pulse 83   Temp 36.7 °C (98 °F) (Temporal)   Resp (!) 22   Ht 1.727 m (5' 8\")   Wt 76.3 kg (168 lb 3.4 oz)   SpO2 " 100%   BMI 25.58 kg/m²   GEN: WDWN Shortness of Breath  HENT: NC/AT; atraumatic external nose  EYES: No icterus  CVS: RRR; No m/r  RESP: clear left lung field and poor right lung field. No chest wall tenderness to palpation  GI: SNTND; +BS  MSK: No C/C  SKIN: No rash     LABS:  Results for JAKE GREEN (MRN [id number]) as of 12/8/2020 08:17   Ref. Range 12/8/2020 04:05   Sodium Latest Ref Range: 135 - 145 mmol/L 137   Potassium Latest Ref Range: 3.6 - 5.5 mmol/L 5.0   Chloride Latest Ref Range: 96 - 112 mmol/L 97   Co2 Latest Ref Range: 20 - 33 mmol/L 23   Anion Gap Latest Ref Range: 7.0 - 16.0  17.0 (H)   Glucose Latest Ref Range: 65 - 99 mg/dL 94   Bun Latest Ref Range: 8 - 22 mg/dL 33 (H)   Creatinine Latest Ref Range: 0.50 - 1.40 mg/dL 5.42 (HH)   GFR If  Latest Ref Range: >60 mL/min/1.73 m 2 12 (A)   GFR If Non  Latest Ref Range: >60 mL/min/1.73 m 2 10 (A)   Calcium Latest Ref Range: 8.4 - 10.2 mg/dL 10.2   Lactic Acid Latest Ref Range: 0.5 - 2.0 mmol/L 2.4 (H)       IMPRESSION:  - ESRD    * Etiology likely 2/2 HTN    * MWF iHD at [facility]  - Volume Overload/Pulm Edema  - HTN    * Goal BP < 140/90    * Stable  - Anemia of CKD    * Goal Hgb 10-11    * Stable  - CKD-MBD    * Managed at HD unit  - Chronic systolic HF  - HLD  - CAD     PLAN:  - HD today with PUF   - MWF iHD during stay and prn  - No dietary protein restrictions  - Dose all meds per ESRD  - Transfuse as needed to maintain Hgb > 7. If still SOB despite volume off may need to transfuse PRBC. Anemia can be contributing to his SOB as well.

## 2020-12-09 NOTE — CONSULTS
"Cardiology Initial Consult Note    Date of note:    12/8/2020      Consulting Physician: Steven Santillan M.D.    Patient ID:    Name:   Parmjit Castelan     YOB: 1940  Age:   80 y.o.  male   MRN:   [id number]      Reason for Consultation: SOB    HPI:  Parmjit Castelan is a 80 y.o.-year-old male with a PMHx stated below in my assessment and plan who presented with SOB.       He has been followed by Dr. Burch, Dr. Godoy, and Dr. Jim Mnedoza in the past. He saw Dr. Godoy recently and they discussed again getting a TAVR, and this was deferred based on the patient's report as he had too many comorbidities.     Currently he can walk around 10 steps before being limited by SOB. His SOB worsened over the course of the weekend after his Friday HD sessions and thus he presented for evaluation.     He is getting dialysis as we speak, and did have blood pressures down to the 70s. He is lightheaded with this. No syncope, but he does acknowledge weight gain and LE swelling at home. No angina.       ROS  Constitution: Negative for chills, fever and night sweats.   HENT: Negative for nosebleeds.    Eyes: Negative for vision loss in left eye and vision loss in right eye.   Respiratory: Negative for hemoptysis.    Gastrointestinal: Negative for hematemesis, hematochezia and melena.   Genitourinary: Negative for hematuria.   Neurological: Negative for focal weakness, numbness and paresthesias.      All others reviewed and negative.      Past Medical History:   Diagnosis Date   • Anesthesia     \"needs more sedation\" (can sometimes hear MD during procedure)    • Anticoagulation monitoring, special range    • Aortic stenosis     mod AS- concern for low flow severe AS with rEFof 30%   • Arterial leg ulcer (HCC) 11/8/2018   • Atrial flutter (HCC) 6/12/2019   • Basal cell carcinoma of left cheek 3/26/2015   • BPH 7/14/2009   • Bronchitis 12/25/2018   • CAD (coronary artery disease) 2/20/2014   • CATARACT     sanjuanita surgery " complete   • CHF (congestive heart failure), NYHA class II, chronic, systolic ([facility]) E F30 in setting of atrial flutter 6/13/2019   • Chronic respiratory failure with hypoxia ([facility]) 5/8/2016   • CKD (chronic kidney disease) stage 4, GFR 15-29 ml/min ([facility]) 1/15/2010    end stage renal disease   • COPD (chronic obstructive pulmonary disease) ([facility])     wears 2.5 L o2 sometimes when he sleeps   • Detached retina    • Dialysis     m,w,f [facility]   • EMPHYSEMA    • Glaucoma     Early stage   • Gout    • Heart burn     occas   • Heart murmur     maria esther lee cardiologist   • Hypertension    • Indigestion     occas   • Kidney cyst    • Leg pain, bilateral 8/10/2015   • On supplemental oxygen therapy    • Peripheral vascular disease ([facility]) 8/10/2015   • Pneumonia    • Primary insomnia 3/22/2018   • Proteinuria    • PVD (peripheral vascular disease) ([facility])    • Sleep apnea     O2 PER CANULA  AT NIGHT 2l/m       Past Surgical History:   Procedure Laterality Date   • GASTROSCOPY-ENDO  11/17/2020    Procedure: GASTROSCOPY;  Surgeon: Juan Matthews M.D.;  Location: SURGERY [facility];  Service: Gastroenterology   • PB COLONOSCOPY,FLEX,W/CONTROL, BLEEDING N/A 2/24/2020    Procedure: COLONOSCOPY, WITH ARGON PLASMA COAGULATION;  Surgeon: Juan Matthews M.D.;  Location: SURGERY SAME DAY [facility];  Service: Gastroenterology   • GASTROSCOPY W/PUSH ENTERSCOPY N/A 2/24/2020    Procedure: GASTROSCOPY, WITH PUSH ENTEROSCOPY;  Surgeon: Juan Matthews M.D.;  Location: SURGERY SAME DAY [facility];  Service: Gastroenterology   • PB COLONOSCOPY,DIAGNOSTIC N/A 2/23/2020    Procedure: COLONOSCOPY;  Surgeon: Enrique Child D.O.;  Location: SURGERY [facility];  Service: Gastroenterology   • GASTROSCOPY N/A 2/23/2020    Procedure: GASTROSCOPY;  Surgeon: Enrique Child D.O.;  Location: [facility];  Service: Gastroenterology   • CAPSULE ENDOSCOPY ADMINISTRATION N/A 2/20/2020    Procedure: ADMINISTRATION,  CAPSULE, FOR CAPSULE ENDOSCOPY;  Surgeon: Gucci Westbrook M.D.;  Location: ENDOSCOPY [facility];  Service: Gastroenterology   • CAPSULE ENDOSCOPY RETRIEVAL  2/20/2020    Procedure: CAPSULE ENDOSCOPY RETRIEVAL;  Surgeon: Gucci Westbrook M.D.;  Location: ENDOSCOPY [facility];  Service: Gastroenterology   • PB SMALL BOWEL ENDOSCOPY,PAST 2ND DUOD  2/19/2020        • COLONOSCOPY - ENDO  2/19/2020        • PB COLONOSCOPY,DIAGNOSTIC  2/19/2020    Procedure: COLONOSCOPY;  Surgeon: Gucci Westbrook M.D.;  [facility];  Service: Gastroenterology   • GASTROSCOPY  2/19/2020    Procedure: GASTROSCOPY;  Surgeon: Gucci Westbrook M.D.;  Location: [facility];  Service: Gastroenterology   • GASTROSCOPY-ENDO  2/7/2020    Procedure: GASTROSCOPY;  Surgeon: Jong Carrasquillo M.D.;  Location: ENDOSCOPY [facility];  Service: Gastroenterology   • STENT PLACEMENT  01/26/2020    lda   • AV FISTULA CREATION Right 8/6/2019    Procedure: CREATION, AV FISTULA -  RIGHT ARM  ,  and Ligation of Left Arm Fistula;  Surgeon: Sera Peters M.D.;  Location: [facility];  Service: General   • CATH PLACEMENT Right 8/6/2019    Procedure: INSERTION, CATHETER - PERMA ,;  Surgeon: Sera Peters M.D.;  Location: [facility];  Service: General   • JUSTIN  6/13/2019    Procedure: ECHOCARDIOGRAM, TRANSESOPHAGEAL;  Surgeon: Harvinder Hoang M.D.;  Location: [facility];  Service: Cardiac   • CARDIOVERSION  6/13/2019    Procedure: CARDIOVERSION;  Surgeon: Harvinder Hoang M.D.;  Location: [facility];  Service: Cardiac   • AV FISTULA CREATION Right 2/21/2019    Procedure: AV FISTULA CREATION - ARM;  Surgeon: David Cason M.D.;  Location: [facility];  Service: General   • FEMORAL ENDARTERECTOMY Left 1/25/2019    Procedure: FEMORAL ENDARTERECTOMY;  Surgeon: David Cason M.D.;  Location: [facility];   Service: General   • FEMORAL POPLITEAL BYPASS Left 1/25/2019    Procedure: LEFT FEMORAL POPLITEAL POLYTETRAFLUOROETHYLENE ePTFE(PROPATEN VASCULAR GRAFT) BYPASS;  Surgeon: David Cason M.D.;  Location: SURGERY [facility];  Service: General   • ANGIOGRAM Left 1/25/2019    Procedure: LEFT LEG ANGIOGRAM;  Surgeon: David Cason M.D.;  Location: SURGERY [facility];  Service: General   • ENDOSCOPY PROCEDURE  3/21/2018    Procedure: ENDOSCOPY PROCEDURE/UPPER;  Surgeon: Enrique Child D.O.;  Location: SURGERY [facility];  Service: Gastroenterology   • COLONOSCOPY - ENDO  8/15/2016    Procedure: COLONOSCOPY - ENDO;  Surgeon: Mane Whatley M.D.;  Location: ENDOSCOPY [facility];  Service:    • GASTROSCOPY WITH BIOPSY  8/13/2016    Procedure: GASTROSCOPY WITH BIOPSY;  Surgeon: Jorge Leavitt M.D.;  Location: [facility];  Service:    • RECOVERY  12/23/2015    Procedure: VASCULAR CASE-CASON-LEFT ARM FISTULOGRAM WITH ANGIOPLASTY;  Surgeon: [facility];  Location: SURGERY PRE-POST PROC UNIT [facility];  Service:    • RECOVERY  3/24/2015    Performed by [facility] at SURGERY PRE-POST PROC UNIT [facility]   • RECOVERY  7/29/2014    Performed by [facility] at SURGERY SAME DAY [facility]   • RECOVERY  3/24/2014    Performed by [facility] at SURGERY [facility]   • RECOVERY  12/17/2013    Performed by [facility] at SURGERY SAME DAY [facility]   • RECOVERY  7/2/2013    Performed by [facility] at SURGERY SAME DAY [facility]   • AV FISTULA THROMBOLYSIS  7/2/2013    Performed by David Cason M.D. at SURGERY [facility]   • RECOVERY  1/29/2013    Performed by [facility] at SURGERY SAME DAY [facility]   • RECOVERY  7/23/2012    Performed by SURGERY, IR-RECOVERY at SURGERY SAME DAY [facility]   • VITRECTOMY POSTERIOR  10/11/2011    Performed by NAHUM GE at SURGERY SAME DAY [facility]   • RECOVERY  8/12/2011     Performed by SURGERY, IR-RECOVERY at SURGERY SAME DAY [facility]   • VITRECTOMY POSTERIOR  1/18/2011    Performed by NAHUM GE at SURGERY SAME DAY [facility]   • SCLERAL BUCKLING  1/18/2011    Performed by NAHUM GE at SURGERY SAME DAY [facility]   • AV FISTULOGRAM  9/17/2010    Performed by ALESHIA MOSCOSO at SURGERY [facility]   • ANGIOPLASTY BALLOON  9/17/2010    Performed by ALESHIA MOSCOSO at SURGERY [facility]   • AV FISTULOGRAM  7/23/2010    Performed by ALESHIA MOSCOSO at SURGERY [facility]   • ANGIOPLASTY BALLOON  7/23/2010    Performed by ALESHIA MOSCOSO at SURGERY [facility]   • AV FISTULA REVISION  2/19/2010    Performed by WILLIE HATCH at SURGERY [facility]   • AV FISTULA CREATION  2/12/2010    Performed by ALESHIA MOSCOSO at SURGERY [facility]S   • CATARACT PHACO WITH IOL  4/8/08    Performed by STACEY PHILLIPS at SURGERY SAME DAY [facility]   • OTHER ORTHOPEDIC SURGERY  1998    right toe for facititis         Medications Prior to Admission   Medication Sig Dispense Refill Last Dose   • traZODone (DESYREL) 150 MG Tab Take 1 Tab by mouth See Admin Instructions. Pt takes 150MG @ 2130 and another 150MG @ 0130 30 Tab 2 12/6/2020 at PM   • torsemide (DEMADEX) 10 MG tablet Take 3 Tabs by mouth every day. (Patient taking differently: Take 30 mg by mouth every morning. 3 tablets = 30 mg) 30 Tab 2 12/6/2020 at AM   • tamsulosin (FLOMAX) 0.4 MG capsule Take 2 Caps by mouth every evening. 90 Cap 2 12/6/2020 at PM   • rosuvastatin (CRESTOR) 40 MG tablet Take 1 Tab by mouth every evening. 90 Tab 3 12/6/2020 at PM   • ROPINIRole (REQUIP) 0.5 MG Tab Take 2 Tabs by mouth every bedtime. 90 Tab 1 12/6/2020 at PM   • montelukast (SINGULAIR) 10 MG Tab Take 1 Tab by mouth every day. 90 Tab 3 12/6/2020 at PM   • fluticasone (FLONASE) 50 MCG/ACT nasal spray SPRAY ONE TO TWO SPRAYS IN EACH NOSTRIL ONCE DAILY *FLONASE* 9.9 mL 0 12/6/2020 at PM   •  albuterol 108 (90 Base) MCG/ACT Aero Soln inhalation aerosol Inhale 2 Puffs by mouth every four hours as needed for Shortness of Breath. 1 Each 0 2020 at PM   • omeprazole (PRILOSEC) 40 MG delayed-release capsule Take 1 Cap by mouth every day. 90 Cap 3 2020 at AM   • B Complex-C-Folic Acid (DIALYVITE PO) Take 1 Tab by mouth every evening.   2020 at PM   • calcium acetate (PHOS-LO) 667 MG Cap Take 1,334-2,001 mg by mouth See Admin Instructions. Pt reports that he takes 2 cap for each snack and 3 cap 3 times a day with meals   2020 at PM           No Known Allergies      Family History   Problem Relation Age of Onset   • Stroke Mother    • Hypertension Mother    • Lung Disease Father         Emphysema, resp failure   • Hypertension Brother    • Heart Disease Brother    • Hypertension Son    • No Known Problems Son          Social History     Socioeconomic History   • Marital status:      Spouse name: Not on file   • Number of children: Not on file   • Years of education: Not on file   • Highest education level: Not on file   Occupational History   • Not on file   Social Needs   • Financial resource strain: Not on file   • Food insecurity     Worry: Not on file     Inability: Not on file   • Transportation needs     Medical: Not on file     Non-medical: Not on file   Tobacco Use   • Smoking status: Former Smoker     Packs/day: 1.00     Years: 40.00     Pack years: 40.00     Types: Cigarettes     Quit date: 2009     Years since quittin.9   • Smokeless tobacco: Never Used   Substance and Sexual Activity   • Alcohol use: No     Alcohol/week: 0.0 oz   • Drug use: No   • Sexual activity: Never     Partners: Female     Comment: , two sons, 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   Lifestyle   • Physical activity     Days per week: Not on file     Minutes per session: Not on file   • Stress: Not on file   Relationships   • Social connections     Talks on phone: Not on file     Gets together: Not  "on file     Attends Worship service: Not on file     Active member of club or organization: Not on file     Attends meetings of clubs or organizations: Not on file     Relationship status: Not on file   • Intimate partner violence     Fear of current or ex partner: Not on file     Emotionally abused: Not on file     Physically abused: Not on file     Forced sexual activity: Not on file   Other Topics Concern   • Not on file   Social History Narrative   • Not on file         Physical Exam  Body mass index is 24.27 kg/m².  /51   Pulse 94   Temp 37 °C (98.6 °F) (Temporal)   Resp 20   Ht 1.727 m (5' 8\")   Wt 72.4 kg (159 lb 9.8 oz)   SpO2 100%   Vitals:    12/08/20 1700 12/08/20 1800 12/08/20 1840 12/08/20 2000   BP: (!) 93/50 (!) 95/55 104/51    Pulse: 86 95 88 94   Resp: 18 16 18 20   Temp:   36.1 °C (97 °F) 37 °C (98.6 °F)   TempSrc:   Temporal Temporal   SpO2: 100% 100% 100%    Weight:       Height:         Oxygen Therapy:  Pulse Oximetry: 100 %, O2 (LPM): 4, O2 Delivery Device: Silicone Nasal Cannula    General: No apparent distress  Eyes: pale conjunctiva  ENT: OP clear  Neck: JVP 5-6 cm H2O  Lungs: normal respiratory effort, decreased breath sounds on the right.   Heart: RRR, 3/6 systolic murmur radiating to bilateral carotids.no rubs or gallops, 1+ edema bilateral lower extremities. No LV/RV heave on cardiac palpatation. Diminished dp and radial pulses.   Abdomen: soft, non tender, non distended, no masses, normal bowel sounds.  No HSM.  Extremities/MSK: no clubbing, no cyanosis  Neurological: No focal sensory deficits  Psychiatric: Appropriate affect, A/O x 3  Skin: Warm extremities        Labs (personally reviewed and notable for):   Trop 292      Cardiac Imaging and Procedures Review:    EKG dated 12/7/2020: My personal interpretation is NSR, RBBB, non-specific st changes, PACs.     TTE 2/2020  CONCLUSIONS  Compared to the images of the prior study done 10/24/2019, no   significant " change.  Limited exam requested, no RVSP.  Left ventricular ejection fraction is visually estimated to be 60%.  Mild concentric left ventricular hypertrophy.  Grade II diastolic dysfunction.  Severely dilated left atrium.  Heavily calcified trileaflet aortic valve with severe aortic stenosis:   Vmax is 4.3  m/s, Mg 41 mmHg, CELIO 0.8 cm2.  Mild mitral regurgitation.    3/17/2020 TTE   CONCLUSIONS  No prior study is available for comparison.   Normal left ventricular systolic function. Left ventricular ejection   fraction is visually estimated to be 65%.   Grade II diastolic dysfunction.  Normal right ventricular size. Reduced right ventricular systolic   function.  Dilated inferior vena cava without inspiratory collapse.  Moderate aortic stenosis. Transvalvular gradients are - Peak: 70 mmHg,   Mean: 35 mmHg.  Estimated right ventricular systolic pressure is 51 mmHg.    Radiology test Review:  CXR:   FINDINGS:  LUNGS: There is probably interstitial edema. There is mild atelectasis at both lung bases.     HEART and MEDIASTINUM: enlarged.     Pleura: There are no pleural effusion or pneumothoraces.     Osseous structures: No significant bony abnormality.          Impression and Medical Decision Making:  # SOB - mixed COPD and severe aortic stenosis as well as ESRD and volume overload.   # Cardiogenic shock secondary to heart failure, resolved.   # Acute on chronic diastolic heart failure secondary to valvular disease and ESRD. Certainly a component of cardiorenal syndrome. NYHA class III, stage C.   # Atrial flutter s/p ablation, not on anticoagulation due to bleeding  # Severe aortic stenosis - seen previously by Dr. Godoy and Dr. Burch for TAVR. Thought to not be a candidate for TAVR as this time per patient.   # ESRD on HD, last 12/4/2020. Gets HD through AV fistula on his right arm,. Previously had left arm fistula ligated.   # Chronic hypoexmic respiratory failure   # CAD s/p LAD stent.   # PAD complicated by  leg ulcers. S/p previously fem pop bypass on the left and femoral endarterectomy  # HELGA  # COPD, previous 40 year pack year smoking history and recurrent pleural effusions followed by Dr. Alejandra Black.     Recommendations:  # consider evaluating right side for thoracentesis  # continue midodrine for hypotension.   # continue crestor  # Continue torsemide, may need to increase dose on the weekends to 40mg PO bid to help him get through the weekend  # repeat echocardiogram, he does not report a recent echo  # request OSH records from Dr. Godoy.   # appears relatively euvolemic  # Currently full code, will have GOC discussions at our outpatient clinic appointments. If Dr. Godoy's notes confirm he is not a TAVR candidate, I think he should be considered for hospice.   # Not on aspirin, plavix, or OAC due to previous bleeding and severe anemia. Further work-up per primary team. Will discuss risks and benefits of aspirin at future visits as well, this seems to have been determined by previous providers.   # will schedule for close cardiology follow-up within the week.       Thank you for allowing me to participate in the care of this patient, Cardiology will sign off.  Please contact me with any questions.      Jonathan Black MD  Cardiologist, [facility] [place]   [phone]

## 2020-12-09 NOTE — WOUND TEAM
[facility]  Inpatient Services  Initial Wound and Skin Care Evaluation    Admission Date: 12/7/2020     Last order of IP CONSULT TO WOUND CARE was found on 12/7/2020 from Hospital Encounter on 12/7/2020       HPI, PMH, SH: Reviewed    Unit where seen by Wound Team: 3317/00     WOUND CONSULT/FOLLOW UP RELATED TO:  Lateral R ankle      Self Report / Pain Level:  Pain with palpation and removal of crust       OBJECTIVE:  Sitting up in chair receiving HD    WOUND TYPE, LOCATION, CHARACTERISTICS (Pressure Injuries: location, stage, POA or date identified)  Wound 12/07/20 Venous Ulcer Ankle Lateral Right scabbed over venous ulcer (Active)     predebridement      Post debridement 12/08/20 1900   Site Assessment Yellow;Painful    Periwound Assessment Hemosiderin Staining;Red;Painful    Margins Defined edges    Closure Secondary intention    Drainage Amount Scant    Drainage Description Purulent;Yellow    Treatments Cleansed    Wound Cleansing Normal Saline Irrigation    Periwound Protectant Not Applicable    Dressing Cleansing/Solutions Not Applicable    Dressing Options Hydrofiber Silver;Silicone Adhesive Foam    Dressing Changed New    Dressing Status Intact    Dressing Change/Treatment Frequency Every 48 hrs, and As Needed    NEXT Dressing Change/Treatment Date 12/10/20    NEXT Weekly Photo (Inpatient Only) 12/16/20    Non-staged Wound Description Full thickness    Wound Length (cm) 1 cm 12/08/20 1900   Wound Width (cm) 1.2 cm 12/08/20 1900   Wound Surface Area (cm^2) 1.2 cm^2 12/08/20 1900   Post-Procedure Length (cm) 1 cm 12/08/20 1900   Post-Procedure Width (cm) 0.8 cm 12/08/20 1900   Post-Procedure Depth (cm) 0.3 cm 12/08/20 1900   Post-Procedure Surface Area (cm^2) 0.8 cm^2 12/08/20 1900   Post-Procedure Volume (cm^3) 0.24 cm^3 12/08/20 1900   Tunneling (cm) 0 cm 12/08/20 1900   Undermining (cm) 0 cm 12/08/20 1900   Shape circular    Wound Odor None    Pulses 1+;DP    Exposed Structures FABIOLA    Number  of days: 1        Vascular:    XAVI:   No results found.      Lab Values:    Lab Results   Component Value Date/Time    WBC 6.3 12/08/2020 04:05 AM    RBC 2.38 (L) 12/08/2020 04:05 AM    HEMOGLOBIN 7.5 (L) 12/08/2020 04:05 AM    HEMATOCRIT 26.3 (L) 12/08/2020 04:05 AM    JOHN 1 05/27/2009 12:16 PM          Culture: no viable tissue to culture  Culture Results show:  No results found for this or any previous visit (from the past 720 hour(s)).      INTERVENTIONS BY WOUND TEAM:  Cleaned wound with NS, dried. Palpated DP pulse 1+. Using forceps and scissors removed adherent crust from wound bed with no bleeding, 2/10 pain that subsided after wound cleaned with NS. Revealed yellow wound bed with no further drainage. Applied silver hydrofiber to wound and secured with ad foam.     Interdisciplinary consultation: Patient, Bedside RN    EVALUATION:Pt has arterial or pressure injury to lateral R malleolus. It is full thickness and had dry crust present that has splinted wound open, had yellow creamy drainage when crust removed. Cleaned with NS and no drainage present afterwards. Aquacel Ag Hydrofiber applied to manage bioburden, absorb exudate, and maintain a moist wound environment without laterally wicking exudate therefore reducing ena-wound maceration    Goals: Slow steady decrease in wound area and depth weekly.    NURSING PLAN OF CARE ORDERS (X):    Dressing changes: See Dressing Care orders: x  Skin care: See Skin Care orders: x  Rectal tube care: See Rectal Tube Care orders:   Other orders:    RSKIN:   CURRENTLY IN PLACE (X), APPLIED THIS VISIT (A), ORDERED (O):  Q shift Octavio: x   Q shift pressure point assessments:    Pressure redistribution mattress            Low Airloss    x      Bariatric MAYRA         Bariatric foam           Heel float boots     Heel Silicone dressing        Float Heels off Bed with Pillows               Barrier wipes         Barrier Cream         Barrier paste          Sacral silicone  dressing         Silicone O2 tubing  x       Anchorfast         Cannula fixation Device (Tender )          Gray Foam Ear protectors           Trach with Optifoam split foam                 Waffle cushion   o     Waffle Overlay         Rectal tube or BMS    Purwick/Condom Cath          Antifungal tx      Interdry          Reposition q 2 hours   X remind pt     Up to chair  x      Ambulate      PT/OT        Dietician        Diabetes Education      PO  x   TF     TPN     NPO   # days   Other        WOUND TEAM PLAN OF CARE   Dressing changes by wound team:          Follow up 1-2 times weekly:               Follow up 3 times weekly:                NPWT change 3 times weekly:     Follow up as needed:   PRN Nursing to take weekly wound photos and notify wound team if wound deteriorates or fails to progress    Other (explain):     Anticipated discharge plans:  LTACH:        SNF/Rehab:                  Home Care:           Outpatient Wound Center:            Self Care:            Other: Needs wound care lateral R ankle

## 2020-12-10 VITALS
DIASTOLIC BLOOD PRESSURE: 53 MMHG | OXYGEN SATURATION: 98 % | SYSTOLIC BLOOD PRESSURE: 111 MMHG | HEIGHT: 68 IN | TEMPERATURE: 97.5 F | WEIGHT: 153.88 LBS | BODY MASS INDEX: 23.32 KG/M2 | RESPIRATION RATE: 17 BRPM | HEART RATE: 98 BPM

## 2020-12-10 PROBLEM — I50.23 ACUTE ON CHRONIC HFREF (HEART FAILURE WITH REDUCED EJECTION FRACTION) (HCC): Status: ACTIVE | Noted: 2020-12-09

## 2020-12-10 PROCEDURE — A9270 NON-COVERED ITEM OR SERVICE: HCPCS | Performed by: INTERNAL MEDICINE

## 2020-12-10 PROCEDURE — 97161 PT EVAL LOW COMPLEX 20 MIN: CPT

## 2020-12-10 PROCEDURE — 97165 OT EVAL LOW COMPLEX 30 MIN: CPT

## 2020-12-10 PROCEDURE — 99239 HOSP IP/OBS DSCHRG MGMT >30: CPT | Performed by: STUDENT IN AN ORGANIZED HEALTH CARE EDUCATION/TRAINING PROGRAM

## 2020-12-10 PROCEDURE — 700102 HCHG RX REV CODE 250 W/ 637 OVERRIDE(OP): Performed by: INTERNAL MEDICINE

## 2020-12-10 RX ORDER — MIDODRINE HYDROCHLORIDE 10 MG/1
10 TABLET ORAL
Qty: 90 TAB | Refills: 3 | Status: SHIPPED | OUTPATIENT
Start: 2020-12-10

## 2020-12-10 RX ADMIN — MONTELUKAST 10 MG: 10 TABLET, FILM COATED ORAL at 05:13

## 2020-12-10 RX ADMIN — MIDODRINE HYDROCHLORIDE 10 MG: 5 TABLET ORAL at 12:02

## 2020-12-10 RX ADMIN — CYANOCOBALAMIN TAB 500 MCG 1000 MCG: 500 TAB at 05:13

## 2020-12-10 RX ADMIN — Medication 667 MG: at 07:39

## 2020-12-10 RX ADMIN — FLUTICASONE PROPIONATE 100 MCG: 50 SPRAY, METERED NASAL at 05:13

## 2020-12-10 RX ADMIN — MIDODRINE HYDROCHLORIDE 10 MG: 5 TABLET ORAL at 07:39

## 2020-12-10 RX ADMIN — TRAZODONE HYDROCHLORIDE 150 MG: 50 TABLET ORAL at 01:48

## 2020-12-10 RX ADMIN — OMEPRAZOLE 40 MG: 20 CAPSULE, DELAYED RELEASE ORAL at 05:14

## 2020-12-10 RX ADMIN — TORSEMIDE 20 MG: 20 TABLET ORAL at 05:13

## 2020-12-10 RX ADMIN — Medication 667 MG: at 12:02

## 2020-12-10 RX ADMIN — FOLIC ACID 1 MG: 1 TABLET ORAL at 05:14

## 2020-12-10 ASSESSMENT — COGNITIVE AND FUNCTIONAL STATUS - GENERAL
WALKING IN HOSPITAL ROOM: A LITTLE
SUGGESTED CMS G CODE MODIFIER DAILY ACTIVITY: CJ
TOILETING: A LITTLE
SUGGESTED CMS G CODE MODIFIER MOBILITY: CJ
MOBILITY SCORE: 22
HELP NEEDED FOR BATHING: A LITTLE
CLIMB 3 TO 5 STEPS WITH RAILING: A LITTLE
DAILY ACTIVITIY SCORE: 21
DRESSING REGULAR LOWER BODY CLOTHING: A LITTLE

## 2020-12-10 ASSESSMENT — GAIT ASSESSMENTS
ASSISTIVE DEVICE: FRONT WHEEL WALKER
GAIT LEVEL OF ASSIST: SUPERVISED
DEVIATION: DECREASED BASE OF SUPPORT;BRADYKINETIC
DISTANCE (FEET): 100

## 2020-12-10 ASSESSMENT — ACTIVITIES OF DAILY LIVING (ADL): TOILETING: INDEPENDENT

## 2020-12-10 NOTE — THERAPY
Physical Therapy   Initial Evaluation     Patient Name: Parmjit Castelan  Age:  80 y.o., Sex:  male  Medical Record #: [id number]  Today's Date: 12/10/2020     Precautions: (P) Fall Risk    Assessment  Patient is 80 y.o. male who was recently admitted for SOB. Pt has a hx of ESRD, aortic stenosis, PAD, HF, and COPD. Pt was agreeable to therapy evaluation and was able to demonstrate safe functional mobility throughout session. Pt was primarily limited due to fatigue and SOB, however, pt states he is near his baseline with activity tolerance. Pt was provided with education on energy conservation techniques, pacing, rest breaks, walk talk test, and self monitoring to improve activity tolerance. Pt encouraged to obtain a 4WW as pt will be able to sit and rest and improve ambulation distance. Pt is in no acute skilled PT needs at this time, anticipate pt to d/c home once medically clear with recs for FWW and HH therapy services to improve functional activity tolerance. Pt will not be actively followed and will be on d/c needs only for d/c planning.     Plan    Recommend Physical Therapy D/C needs only     DC Equipment Recommendations: (P) Front-Wheel Walker, 4-Wheeled Walker  Discharge Recommendations: (P) Recommend home health for continued physical therapy services     Objective       12/10/20 1059   Prior Living Situation   Prior Services None   Housing / Facility 1 Story House   Steps Into Home 0   Steps In Home 0   Bathroom Set up Walk In Shower;Shower Chair   Equipment Owned Single Point Cane   Lives with - Patient's Self Care Capacity Spouse   Comments pt states spouse is able to assist upon d/c to home   Prior Level of Functional Mobility   Bed Mobility Independent   Transfer Status Independent   Ambulation Independent   Distance Ambulation (Feet)   (household distances )   Assistive Devices Used None   Stairs Independent   Comments pt states of an IPLOF; however has poor activity tolerance at baseline   Gait Analysis    Gait Level Of Assist Supervised   Assistive Device Front Wheel Walker   Distance (Feet) 100   # of Times Distance was Traveled 2   Deviation Decreased Base Of Support;Bradykinetic   Weight Bearing Status fwb   Comments pt primarily limited by fatigue; required frequent rest breaks   Bed Mobility    Supine to Sit Supervised   Sit to Supine Supervised   Scooting Supervised   Rolling Supervised   Functional Mobility   Sit to Stand Supervised   Bed, Chair, Wheelchair Transfer Supervised

## 2020-12-10 NOTE — PROGRESS NOTES
[facility] Progress Note           CHIEF COMPLAINT:   - Shortness of Breath     HISTORY OF PRESENT ILLNESS:    81 yo M PMH ESRD MWF iHD, HTN, anemia of CKD, and CKD-MBD who presents to ED with CC as above. He denies Covid exposure.  He normally dialyzes at [facility]. Nephrology was asked to consult for ESRD, HD management and maintenance respiectively. Pt is currently SOB and on BiPAP. Serum K+ is 5.9. CXR with pulm edema.    Daily Nephrology Summary:  12/8 - S/p HD yesterday and tolerated well. Feels good but still weak and poor exercise tolerance. 3L UF. ECHO from march 2020 Normal left ventricular chamber size. Normal left ventricular wall thickness. Normal left ventricular systolic function. Left ventricular ejection fraction is visually estimated to be 65%. Abnormal septal motion. Grade II diastolic dysfunction.  12/9 - S/p PUF with 3 L successfully removed. Feels better. CXR from yesterday showed interstitial edema.  12/10 - S/p HD yesterday with net UF of 1.1 L. Patient and wife going to speak with Palliative Care.    REVIEW OF SYSTEMS:    All systems were reviewed and are negative except for what's mentioned above.     PAST MEDICAL HISTORY:   - ESRD MWF iHD  - HTN  - Anemia of CKD  - CKD-MBD  - HLD  - CAD  - COPD  - Glaucoma  - AFlutte  - GERD  - PVD  - HELGA  - Chronic systolic HF, EF 30%     PAST SURGICAL HISTORY:   - AVF creation  - Multiple AVF revisions and fistulagrams  - Vitrectomy multiple times  - Gastroscopy  - LHC  - BLE angiogram  - Fem-Pop bypass  - Capsule endoscopy  - Colonoscopy     FAMILY HISTORY:   - Reviewed and non contributory to current illness     SOCIAL HISTORY:   - No tobacco, quit 11 years ago  - No EtOH  - No illicits     HOME MEDICATIONS:   - Reviewed and documented in chart     LABORATORY STUDIES:   - Reviewed and documented in chart     ALLERGIES:  Patient has no known allergies.     PHYSICAL EXAM:  VS:  /61   Pulse 83   Temp  "36.7 °C (98 °F) (Temporal)   Resp (!) 22   Ht 1.727 m (5' 8\")   Wt 76.3 kg (168 lb 3.4 oz)   SpO2 100%   BMI 25.58 kg/m²   GEN: WDWN Shortness of Breath  HENT: NC/AT; atraumatic external nose  EYES: No icterus  CVS: RRR; No m/r  RESP: clear left lung field and poor right lung field. No chest wall tenderness to palpation  GI: SNTND; +BS  MSK: No C/C  SKIN: No rash     LABS:  Results for JAKE GREEN (MRN [id number]) as of 12/8/2020 08:17   Ref. Range 12/8/2020 04:05   Sodium Latest Ref Range: 135 - 145 mmol/L 137   Potassium Latest Ref Range: 3.6 - 5.5 mmol/L 5.0   Chloride Latest Ref Range: 96 - 112 mmol/L 97   Co2 Latest Ref Range: 20 - 33 mmol/L 23   Anion Gap Latest Ref Range: 7.0 - 16.0  17.0 (H)   Glucose Latest Ref Range: 65 - 99 mg/dL 94   Bun Latest Ref Range: 8 - 22 mg/dL 33 (H)   Creatinine Latest Ref Range: 0.50 - 1.40 mg/dL 5.42 (HH)   GFR If  Latest Ref Range: >60 mL/min/1.73 m 2 12 (A)   GFR If Non  Latest Ref Range: >60 mL/min/1.73 m 2 10 (A)   Calcium Latest Ref Range: 8.4 - 10.2 mg/dL 10.2   Lactic Acid Latest Ref Range: 0.5 - 2.0 mmol/L 2.4 (H)       IMPRESSION:  - ESRD    * Etiology likely 2/2 HTN    * MWF iHD at [facility]   - Volume Overload/Pulm Edema  - HTN    * Goal BP < 140/90    * Stable  - Anemia of CKD    * Goal Hgb 10-11    * Stable  - CKD-MBD    * Managed at HD unit  - Chronic systolic HF  - HLD  - CAD     PLAN:  - No plans for HD today  - Palliative care evaluating   - MWF iHD during stay and prn  - No dietary protein restrictions  - Dose all meds per ESRD  - Transfuse as needed to maintain Hgb > 7. If still SOB despite volume off may need to transfuse PRBC. Anemia can be contributing to his SOB as well.          "

## 2020-12-10 NOTE — PROGRESS NOTES
HD note: One attempt was needed to place each HD needle. Pt didn't tolerate tx well, despite Midodrine, Albumin, and multiple saline bolus.   MD stopped by to talk about his weakened heart and reasons for low blood pressure.  Net fluid removed was 1100mL.  AVF achieved hemostasis within 4 minutes of pulling HD needle.

## 2020-12-10 NOTE — PROGRESS NOTES
Reviewed ECHO results and discussed with Cardiology Dr. Castellanos. Thinks patient is progressing to end stage heart failure and AS.    Long discussion had with wife and  regarding end of life care and what he would want knowing his poor prognosis.    Patient wanted to be DNR/DNI. He would like palliative care consult and to discuss hospice care options. Palliative care consult placed. Wife requests that she be present during discussions with palliative care team (requests meeting in afternoon during visiting hours if possible).

## 2020-12-10 NOTE — DISCHARGE PLANNING
LSW spoke with spouse Yaquelin is agreeable to healthy living at home for HHC. LSW stated she would f/u with them in the AM.   LSW faxed choice to [facility]    MD and Pt aware that pt is d/c without confirmation of HHC.

## 2020-12-10 NOTE — RESPIRATORY CARE
"COPD EDUCATION by COPD CLINICAL EDUCATOR  12/10/2020  at  3:59 PM by Nicole Beach, RRT     Patient interviewed by COPD education team.  Patient unable to participate in full program.  Short intervention has been conducted.  A comprehensive packet including information about COPD, treatments, what is COPD (how the lungs work), daily medications rescue and maintenance, breathing techniques, infection prevention and oxygen safety were covered in detail.  A comprehensive packet including information about COPD, treatments, and oxygen safety was given.      Patient has spacer, Patient instructed on importance of cleaning home nebulizer after every treatment to prevent infection. A personalized \"COPD Action Plan\" was reviewed with and provided to the patient.  Doctor is ordering Home Health, pt does not qualify for [facility].  "

## 2020-12-10 NOTE — FACE TO FACE
Face to Face Supporting Documentation - Home Health    The encounter with this patient was in whole or in part the primary reason for home health admission.    Date of encounter:   Patient:                    MRN:                       YOB: 2018  Parmjit Castelan  [id number]  1940     Home health to see patient for:  Skilled Nursing care for assessment, interventions & education and Physical Therapy evaluation and treatment    Skilled need for:  Exacerbation of Chronic Disease State Heart failure with reduced ejection fraction, ESRD on HD    Skilled nursing interventions to include:  Comment: Medication education    Homebound status evidenced by:  Need the aid of supportive devices such as crutches, canes, wheelchairs or walkers or Needs the assistance of another person in order to leave the home. Leaving home requires a considerable and taxing effort. There is a normal inability to leave the home.    Community Physician to provide follow up care: Maryse Hartman M.D.     Optional Interventions? Yes, Details: Any and all interventions medically necessary      I certify the face to face encounter for this home health care referral meets the CMS requirements and the encounter/clinical assessment with the patient was, in whole, or in part, for the medical condition(s) listed above, which is the primary reason for home health care. Based on my clinical findings: the service(s) are medically necessary, support the need for home health care, and the homebound criteria are met.  I certify that this patient has had a face to face encounter by myself.  Karlos Gupta M.D. - NPI: [id number]

## 2020-12-11 NOTE — PROGRESS NOTES
Patient discharged home. Discharge instructions explained to patient. All questions answered. Patient verbalizes understanding of discharge instructions. INsturctions given to patient. PIV and tele box removed. All belongings with patient including clothes and cell phone.

## 2020-12-11 NOTE — CONSULTS
Reason for PC Consult: Advance Care Planning    Consulted by: Dr Gupta     Assessment:  General: 79 yo male admitted on 12/7/2020 for Hypoxia   PMH: Anticoagulation monitoring-special range, Aortic stenosis, Arterial leg ulcer, Atrial flutter, Basal cell carcinoma of left cheek-2015, BPH, Bronchitis, CAD, Cataract, CHF-NYHA class II, EF30 in setting of atrial flutter-2019, Chronic respiratory failure with hypoxia, CKD IV, COPD, Detached retina, Dialysis, Emphysema, Glaucoma, Gout, Heart burn, Heart murmur, HTN, Indigestion, Kidney cyst, Leg pain, bilateral, On supplemental oxygen therapy, Peripheral vascular disease, PNU Primary insomnia, Proteinuria, PVD, Sleep apnea.    Pt presents to the ED due to increasing SOB and feeling wheezy. He has COPD and is a dialysis pt due for dialysis today. He does state that his weight is stable and he is having some chest tightness.     Social: Pt lives with his wife and enjoys life and his dog, Mocha. He feels that dialysis is not a burden for him and has been on dialysis for approximately 8 years.     Consults:   Nephrology  Gastroenterology  Cardiology  Palliative    Dyspnea: No- resp rate 17, 98% on room air  Last BM: 12/07/20-    Pain: No-    Depression: Mood appropriate for situation-    Dementia: No;       Spiritual:  Is Mandaeism or spirituality important for coping with this illness? Yes-    Has a  or spiritual provider visit been requested? No    Palliative Performance Scale: 50%    Advance Directive: Advance Directive on file    DPOA: Yes- Yaquelin Castelan [phone], spouse   POLST: Yes-      Code Status: DNR- I ok, with selective treatment    Outcome:  Met with the pt, his son Carlos Eduardo, and Dr Gupta at the bedside. Introduced self and the role of Palliative care. We discussed hospice versus HH versus extra caregivers as needed. We spoke about dialysis. Ronny feels that he is not ready for hospice and wishes to continue with dialysis and treatment. We spoke  "about what hospice provides once he is ready, such as DME, medication management, MD oversight, and RN and CNA visits.   We discussed what HH provides. Ronny feels that he would like to continue working on his strength in order to attempt to continue to improve at this time. He does not mind going to dialysis and is planning on going tomorrow. He is able to ambulate and get around well. He feels he still \"has some good time left\" and wishes to work and focus on that. Validated his thoughts and feelings.   We spoke about using a HH agency who could provide him with Hospice care at a later time and provided him with the list of Hospice agencies for future reference.     We spoke about his POLST, which currently is for DNR with I ok. He is unsure if he wishes to keep the intubation aspect on his POLST but wants to discuss this further with his wife who is unable to be here today. Provided pt with the Palliative Team contact number and let him know that he can discuss with his wife at home and we are more than happy to answer any questions.     Pt was unsure where his AD is at home. Let him know that I can print him a copy to take home, he is agreeable to that. Answered all questions for pt and son, let them both know to [facility] they have any further questions once they are home.     Updated: Dr Gupta aware.     Plan: DC to home, possibly with HH, although pt is comfortable to speak with his PCP for HH follow up     Thank you for allowing Palliative Care to participate in this patient's care. Please feel free to call [phone] with any questions or concerns.  "

## 2020-12-11 NOTE — THERAPY
Occupational Therapy   Initial Evaluation     Patient Name: Parmjit Castelan  Age:  80 y.o., Sex:  male  Medical Record #: [id number]  Today's Date: 12/11/2020     Precautions  Precautions: (P) Fall Risk  Comments: easily fatigued    Assessment  Patient is 80 y.o. male with a diagnosis of SOB. Hx of CAD, ESRD- HD M,W,F, COPD, GIB. A&Ox4. Performs ADL transfes with Sup.FWW. Seated grooming with Sup. LB dressing with Shaggy. Tires easily in standing. UIC post session. Lives with spouse who is able to help when needed.       Plan  Recommend Occupational Therapy for Evaluation only.  DC Equipment Recommendations: (P) Unable to determine at this time  Discharge Recommendations: (P) Recommend home health for continued occupational therapy services      12/10/20 1329   Prior Living Situation   Prior Services None   Housing / Facility 1 Story House   Steps Into Home 0   Steps In Home 0   Bathroom Set up Walk In Shower;Shower Chair   Equipment Owned Single Point Cane;Tub / Shower Seat   Lives with - Patient's Self Care Capacity Spouse   Prior Level of ADL Function   Self Feeding Independent   Grooming / Hygiene Independent   Bathing Independent   Dressing Independent   Toileting Independent   Prior Level of IADL Function   Medication Management Unable To Determine At This Time   Laundry Requires Assist   Kitchen Mobility Independent   Finances Unable To Determine At This Time   Home Management Requires Assist   Shopping Requires Assist   Prior Level Of Mobility Independent With Device in Home   Driving / Transportation Relatives / Others Provide Transportation   Occupation (Pre-Hospital Vocational) Retired Due To Age   Leisure Interests Hobbies   Balance Assessment   Sitting Balance (Static) Good   Sitting Balance (Dynamic) Fair +   Standing Balance (Static) Good   Standing Balance (Dynamic) Fair +   Weight Shift Sitting Good   Weight Shift Standing Fair   ADL Assessment   Eating Supervision   Grooming Supervision;Standing   Lower  Body Dressing Supervision   Functional Mobility   Sit to Stand Supervised   Bed, Chair, Wheelchair Transfer Supervised

## 2020-12-11 NOTE — DISCHARGE SUMMARY
"Discharge Summary    CHIEF COMPLAINT ON ADMISSION  Chief Complaint   Patient presents with   • Shortness of Breath     Pt reports is unable to \"catch my breath\"   • Sent by MD     Pt reports sent by Cardiologist       Reason for Admission  shortness of breath,      Admission Date  12/7/2020    CODE STATUS  DNAR/DNI    HPI & HOSPITAL COURSE    80M with PMHx of ESRD on HD (MWF), CAD, PAD, HFpEF (grade II diastolic dysfunction), moderate AS, COPD, Anemia, GIB due to AVM and gastritis. Patient presented to ED with worsening SOB. Reported significant dyspnea on exertion and orthopnea. Patient has been compliant with dialysis, last dialysis prior to admission on Friday. Patient denied any known COVID exposure or infectious symptoms. He endorses worsening exercise tolerance and can only walk few steps before he needs to rest. Patient does report that he frequently is hypotensive during dialysis which sometimes limits his length and amount of fluid they are able to remove.    On admission he was noted to be volume overloaded and was admitted to ICU for BiPAP. He underwent dialysis 12/7 and 12/8 with fluid removal and required levophed support to complete. While in ICU he was started on midodrine and levophed was discontinued.    Patient was weaned off BiPAP 12/8 and was transferred to the floor 12/9 where he had an additional run of dialysis. ECHO showed progression of heart failure to HFrEF (LVEF 35%) and with global hypokinesis and severe aortic stenosis. Cardiology evaluated patient and recommend continuing midodrine. Patient cannot be started on ACE, BBLocker, or Spironolactone due to hypotension which is limiting effectiveness of dialysis. Cardiology thinks this may be progressing to end stage disease. Patient would like to follow up with Dr. Black as outpatient.    Discussion was had with patient regarding goals of care. Patient has POLST with DNR, trial of DNI. Palliative care was consulted, however he wants to " remain on dialysis and is not ready for hospice. PT evaluated and he would benefit from home health and home PT. Referral placed, however he wanted to be discharged home prior to confirmation. Patient was informed that he can follow up with PCP for home health referral.    Patient felt better on day of discharge. He was able to ambulate with PT and was off oxygen. Patient wanted to be discharged. He will be discharged with midodrine and will follow up with his dialysis center tomorrow for his regularly scheduled dialysis.      Therefore, he is discharged in guarded and stable condition to home with organized home healthcare and close outpatient follow-up.    The patient met 2-midnight criteria for an inpatient stay at the time of discharge.    Discharge Date  12/10/2020    FOLLOW UP ITEMS POST DISCHARGE  Follow up with PCP regarding home health  Please go to dialysis center tomorrow for dialysis  Follow up with cardiology to discuss TAVR and further optimization of medications    DISCHARGE DIAGNOSES  Principal Problem:    ESRD (end stage renal disease) on dialysis ([facility]) POA: Yes  Active Problems:    Hemodialysis-associated hypotension POA: Yes    Coronary artery disease POA: Yes      Overview: Coronary calcium was noted on chest CT scan again in 2010. Myocardial       perfusion imaging in 2011 showed no evidence of ischemia or infarction    PAD (peripheral artery disease) ([facility]) POA: Yes    Severe aortic stenosis POA: Yes    Restless leg syndrome POA: Yes    Venous stasis ulcer of right ankle limited to breakdown of skin ([facility]) POA: Yes    Acute on chronic HFrEF (heart failure with reduced ejection fraction) ([facility]) POA: Yes    HELGA (obstructive sleep apnea) POA: Yes    COPD (chronic obstructive pulmonary disease) ([facility]) POA: Yes  Resolved Problems:    * No resolved hospital problems. *      FOLLOW UP  Future Appointments   Date Time Provider Department Center   12/15/2020  8:00 AM Jonathan Black M.D. [facility] None    12/22/2020  2:15 PM Beverley Jara P.A.-C. [facility] None   4/20/2021  2:00 PM ROLDAN Talavera M.D.  [address]  [address]  [facility] [id number] [phone]    In 2 weeks      Maryse Hartman M.D.  [address]  [address]  [facility] [id number] [phone]    In 1 week        MEDICATIONS ON DISCHARGE     Medication List      START taking these medications      Instructions   midodrine 10 MG tablet  Commonly known as: PROAMATINE   Take 1 Tab by mouth 3 times a day, with meals.  Dose: 10 mg        CHANGE how you take these medications      Instructions   torsemide 10 MG tablet  What changed:   · when to take this  · additional instructions  Commonly known as: DEMADEX   Doctor's comments: Do fill until patient calls please  Take 3 Tabs by mouth every day.  Dose: 30 mg        CONTINUE taking these medications      Instructions   albuterol 108 (90 Base) MCG/ACT Aers inhalation aerosol   Doctor's comments: Do fill until patient calls please  Inhale 2 Puffs by mouth every four hours as needed for Shortness of Breath.  Dose: 2 Puff     calcium acetate 667 MG Caps  Commonly known as: PHOS-LO   Take 1,334-2,001 mg by mouth See Admin Instructions. Pt reports that he takes 2 cap for each snack and 3 cap 3 times a day with meals  Dose: 1,334-2,001 mg     DIALYVITE PO   Take 1 Tab by mouth every evening.  Dose: 1 Tab     fluticasone 50 MCG/ACT nasal spray  Commonly known as: FLONASE   Doctor's comments: Do fill until patient calls please  SPRAY ONE TO TWO SPRAYS IN EACH NOSTRIL ONCE DAILY *FLONASE*     montelukast 10 MG Tabs  Commonly known as: SINGULAIR   Doctor's comments: Do fill until patient calls please  Take 1 Tab by mouth every day.  Dose: 10 mg     omeprazole 40 MG delayed-release capsule  Commonly known as: PRILOSEC   Doctor's comments: Do fill until patient calls please  Take 1 Cap by mouth every day.  Dose: 40 mg     ROPINIRole 0.5 MG Tabs  Commonly known as: REQUIP    Doctor's comments: Do fill until patient calls please  Take 2 Tabs by mouth every bedtime.  Dose: 1 mg     rosuvastatin 40 MG tablet  Commonly known as: CRESTOR   Doctor's comments: Do fill until patient calls please  Take 1 Tab by mouth every evening.  Dose: 40 mg     tamsulosin 0.4 MG capsule  Commonly known as: FLOMAX   Doctor's comments: Do fill until patient calls please  Take 2 Caps by mouth every evening.  Dose: 0.8 mg     traZODone 150 MG Tabs  Commonly known as: DESYREL   Take 1 Tab by mouth See Admin Instructions. Pt takes 150MG @ 2130 and another 150MG @ 0130  Dose: 150 mg            Allergies  No Known Allergies    DIET  Orders Placed This Encounter   Procedures   • Diet Order Diet: Renal     Standing Status:   Standing     Number of Occurrences:   1     Order Specific Question:   Diet:     Answer:   Renal [8]       ACTIVITY  As tolerated.  Weight bearing as tolerated    CONSULTATIONS  Cardiology  Nephrology  Critical Care  Palliative    PROCEDURES  EC-ECHOCARDIOGRAM COMPLETE W/O CONT   Final Result      DX-CHEST-PORTABLE (1 VIEW)   Final Result      Unchanged probable interstitial edema and mild bilateral atelectasis      DX-CHEST-PORTABLE (1 VIEW)   Final Result      Worsening interstitial edema, stable small right effusion      Stable cardiac silhouette enlargement      Resolved left basilar atelectasis            LABORATORY  Lab Results   Component Value Date    SODIUM 139 12/09/2020    POTASSIUM 4.2 12/09/2020    CHLORIDE 98 12/09/2020    CO2 23 12/09/2020    GLUCOSE 143 (H) 12/09/2020    BUN 55 (H) 12/09/2020    CREATININE 7.40 (HH) 12/09/2020    CREATININE 2.1 (H) 05/06/2009        Lab Results   Component Value Date    WBC 3.9 (L) 12/09/2020    HEMOGLOBIN 7.7 (L) 12/09/2020    HEMATOCRIT 26.4 (L) 12/09/2020    PLATELETCT 220 12/09/2020        Total time of the discharge process exceeds 35 minutes.

## 2020-12-11 NOTE — DISCHARGE INSTRUCTIONS
Discharge Instructions    Discharged to home by car with relative. Discharged via wheelchair, hospital escort: Yes.  Special equipment needed: Walker    Be sure to schedule a follow-up appointment with your primary care doctor or any specialists as instructed.     Discharge Plan:   Diet Plan: Discussed  Activity Level: Discussed  Confirmed Follow up Appointment: Patient to Call and Schedule Appointment  Confirmed Symptoms Management: Discussed  Medication Reconciliation Updated: Yes    I understand that a diet low in cholesterol, fat, and sodium is recommended for good health. Unless I have been given specific instructions below for another diet, I accept this instruction as my diet prescription.   Other diet: Renal    Special Instructions: None    · Is patient discharged on Warfarin / Coumadin?   No     Depression / Suicide Risk    As you are discharged from this [facility] facility, it is important to learn how to keep safe from harming yourself.    Recognize the warning signs:  · Abrupt changes in personality, positive or negative- including increase in energy   · Giving away possessions  · Change in eating patterns- significant weight changes-  positive or negative  · Change in sleeping patterns- unable to sleep or sleeping all the time   · Unwillingness or inability to communicate  · Depression  · Unusual sadness, discouragement and loneliness  · Talk of wanting to die  · Neglect of personal appearance   · Rebelliousness- reckless behavior  · Withdrawal from people/activities they love  · Confusion- inability to concentrate     If you or a loved one observes any of these behaviors or has concerns about self-harm, here's what you can do:  · Talk about it- your feelings and reasons for harming yourself  · Remove any means that you might use to hurt yourself (examples: pills, rope, extension cords, firearm)  · Get professional help from the community (Mental Health, Substance Abuse, psychological  counseling)  · Do not be alone:Call your Safe Contact- someone whom you trust who will be there for you.  · Call your local CRISIS HOTLINE 624-9131 or 169-518-9018  · Call your local Children's Mobile Crisis Response Team [place] (476) 743-4099 or www.[employer]  · Call the toll free National Suicide Prevention Hotlines   · National Suicide Prevention Lifeline 668-092-HLFP (2836)  · [employer] Hope Line Network 800-SUICIDE (668-3340)    Heart Failure Action Plan  A heart failure action plan helps you understand what to do when you have symptoms of heart failure. Follow the plan that was created by you and your health care provider. Review your plan each time you visit your health care provider.  Red zone  These signs and symptoms mean you should get medical help right away:  · You have trouble breathing when resting.  · You have a dry cough that is getting worse.  · You have swelling or pain in your legs or abdomen that is getting worse.  · You suddenly gain more than 2-3 lb (0.9-1.4 kg) in a day, or more than 5 lb (2.3 kg) in one week. This amount may be more or less depending on your condition.  · You have trouble staying awake or you feel confused.  · You have chest pain.  · You do not have an appetite.  · You pass out.  If you experience any of these symptoms:  · Call your local emergency services (911 in the [place] right away or seek help at the emergency department of the nearest hospital.  Yellow zone  These signs and symptoms mean your condition may be getting worse and you should make some changes:  · You have trouble breathing when you are active or you need to sleep with extra pillows.  · You have swelling in your legs or abdomen.  · You gain 2-3 lb (0.9-1.4 kg) in one day, or 5 lb (2.3 kg) in one week. This amount may be more or less depending on your condition.  · You get tired easily.  · You have trouble sleeping.  · You have a dry cough.  If you experience any of these symptoms:  · Contact  your health care provider within the next day.  · Your health care provider may adjust your medicines.  Green zone  These signs mean you are doing well and can continue what you are doing:  · You do not have shortness of breath.  · You have very little swelling or no new swelling.  · Your weight is stable (no gain or loss).  · You have a normal activity level.  · You do not have chest pain or any other new symptoms.  Follow these instructions at home:  · Take over-the-counter and prescription medicines only as told by your health care provider.  · Weigh yourself daily. Your target weight is __________ lb (__________ kg).  ? Call your health care provider if you gain more than __________ lb (__________ kg) in a day, or more than __________ lb (__________ kg) in one week.  · Eat a heart-healthy diet. Work with a diet and nutrition specialist (dietitian) to create an eating plan that is best for you.  · Keep all follow-up visits as told by your health care provider. This is important.  Where to find more information  · American Heart Association: www.heart.org  Summary  · Follow the action plan that was created by you and your health care provider.  · Get help right away if you have any symptoms in the Red zone.  This information is not intended to replace advice given to you by your health care provider. Make sure you discuss any questions you have with your health care provider.  Document Released: 01/27/2018 Document Revised: 11/30/2018 Document Reviewed: 01/27/2018  Elsevier Patient Education © 2020 [employer].

## 2020-12-14 ENCOUNTER — TELEPHONE (OUTPATIENT)
Dept: CARDIOLOGY | Facility: MEDICAL CENTER | Age: 80
End: 2020-12-14

## 2020-12-14 ENCOUNTER — HOSPITAL ENCOUNTER (INPATIENT)
Facility: MEDICAL CENTER | Age: 80
LOS: 5 days | DRG: 871 | End: 2020-12-19
Attending: EMERGENCY MEDICINE | Admitting: INTERNAL MEDICINE
Payer: MEDICARE

## 2020-12-14 ENCOUNTER — APPOINTMENT (OUTPATIENT)
Dept: RADIOLOGY | Facility: MEDICAL CENTER | Age: 80
DRG: 871 | End: 2020-12-14
Attending: EMERGENCY MEDICINE
Payer: MEDICARE

## 2020-12-14 DIAGNOSIS — R65.21 SEPTIC SHOCK (HCC): ICD-10-CM

## 2020-12-14 DIAGNOSIS — N48.89 PENILE PAIN: ICD-10-CM

## 2020-12-14 DIAGNOSIS — A41.9 SEPTIC SHOCK (HCC): ICD-10-CM

## 2020-12-14 DIAGNOSIS — K81.0 ACUTE CHOLECYSTITIS: ICD-10-CM

## 2020-12-14 DIAGNOSIS — R10.2 PELVIC PAIN: ICD-10-CM

## 2020-12-14 DIAGNOSIS — N48.1 BALANITIS: ICD-10-CM

## 2020-12-14 PROBLEM — K81.9 ACALCULOUS CHOLECYSTITIS: Status: ACTIVE | Noted: 2020-12-14

## 2020-12-14 PROBLEM — I50.22 CHRONIC SYSTOLIC CONGESTIVE HEART FAILURE (HCC): Status: ACTIVE | Noted: 2020-12-09

## 2020-12-14 LAB
ALBUMIN SERPL BCP-MCNC: 4 G/DL (ref 3.2–4.9)
ALBUMIN/GLOB SERPL: 1.3 G/DL
ALP SERPL-CCNC: 122 U/L (ref 30–99)
ALT SERPL-CCNC: 71 U/L (ref 2–50)
ANION GAP SERPL CALC-SCNC: 32 MMOL/L (ref 7–16)
APTT PPP: 34.9 SEC (ref 24.7–36)
AST SERPL-CCNC: 84 U/L (ref 12–45)
BASOPHILS # BLD AUTO: 0.2 % (ref 0–1.8)
BASOPHILS # BLD: 0.02 K/UL (ref 0–0.12)
BILIRUB SERPL-MCNC: 1 MG/DL (ref 0.1–1.5)
BUN SERPL-MCNC: 69 MG/DL (ref 8–22)
CALCIUM SERPL-MCNC: 10.5 MG/DL (ref 8.4–10.2)
CHLORIDE SERPL-SCNC: 89 MMOL/L (ref 96–112)
CO2 SERPL-SCNC: 14 MMOL/L (ref 20–33)
CORTIS SERPL-MCNC: 30.4 UG/DL (ref 0–23)
COVID ORDER STATUS COVID19: NORMAL
CREAT SERPL-MCNC: 9.11 MG/DL (ref 0.5–1.4)
EKG IMPRESSION: NORMAL
EOSINOPHIL # BLD AUTO: 0.01 K/UL (ref 0–0.51)
EOSINOPHIL NFR BLD: 0.1 % (ref 0–6.9)
ERYTHROCYTE [DISTWIDTH] IN BLOOD BY AUTOMATED COUNT: 79.4 FL (ref 35.9–50)
FLUAV RNA SPEC QL NAA+PROBE: NEGATIVE
FLUBV RNA SPEC QL NAA+PROBE: NEGATIVE
GLOBULIN SER CALC-MCNC: 3.1 G/DL (ref 1.9–3.5)
GLUCOSE SERPL-MCNC: 79 MG/DL (ref 65–99)
HCT VFR BLD AUTO: 28.4 % (ref 42–52)
HGB BLD-MCNC: 8.1 G/DL (ref 14–18)
IMM GRANULOCYTES # BLD AUTO: 0.07 K/UL (ref 0–0.11)
IMM GRANULOCYTES NFR BLD AUTO: 0.6 % (ref 0–0.9)
INR PPP: 1.55 (ref 0.87–1.13)
LACTATE BLD-SCNC: 1.4 MMOL/L (ref 0.5–2)
LACTATE BLD-SCNC: 2.6 MMOL/L (ref 0.5–2)
LACTATE BLD-SCNC: 3.8 MMOL/L (ref 0.5–2)
LACTATE BLD-SCNC: 8.1 MMOL/L (ref 0.5–2)
LACTATE BLD-SCNC: 9.7 MMOL/L (ref 0.5–2)
LIPASE SERPL-CCNC: 63 U/L (ref 7–58)
LYMPHOCYTES # BLD AUTO: 0.43 K/UL (ref 1–4.8)
LYMPHOCYTES NFR BLD: 3.9 % (ref 22–41)
MCH RBC QN AUTO: 32.1 PG (ref 27–33)
MCHC RBC AUTO-ENTMCNC: 28.5 G/DL (ref 33.7–35.3)
MCV RBC AUTO: 112.7 FL (ref 81.4–97.8)
MONOCYTES # BLD AUTO: 0.81 K/UL (ref 0–0.85)
MONOCYTES NFR BLD AUTO: 7.3 % (ref 0–13.4)
NEUTROPHILS # BLD AUTO: 9.73 K/UL (ref 1.82–7.42)
NEUTROPHILS NFR BLD: 87.9 % (ref 44–72)
NRBC # BLD AUTO: 0.03 K/UL
NRBC BLD-RTO: 0.3 /100 WBC
PLATELET # BLD AUTO: 211 K/UL (ref 164–446)
PMV BLD AUTO: 10.9 FL (ref 9–12.9)
POTASSIUM SERPL-SCNC: 6.6 MMOL/L (ref 3.6–5.5)
PROT SERPL-MCNC: 7.1 G/DL (ref 6–8.2)
PROTHROMBIN TIME: 18.2 SEC (ref 12–14.6)
RBC # BLD AUTO: 2.52 M/UL (ref 4.7–6.1)
RSV RNA SPEC QL NAA+PROBE: NEGATIVE
SARS-COV-2 RNA RESP QL NAA+PROBE: NOTDETECTED
SODIUM SERPL-SCNC: 135 MMOL/L (ref 135–145)
SPECIMEN SOURCE: NORMAL
WBC # BLD AUTO: 11.1 K/UL (ref 4.8–10.8)

## 2020-12-14 PROCEDURE — 74177 CT ABD & PELVIS W/CONTRAST: CPT

## 2020-12-14 PROCEDURE — 700111 HCHG RX REV CODE 636 W/ 250 OVERRIDE (IP): Performed by: INTERNAL MEDICINE

## 2020-12-14 PROCEDURE — 87205 SMEAR GRAM STAIN: CPT

## 2020-12-14 PROCEDURE — 05HY33Z INSERTION OF INFUSION DEVICE INTO UPPER VEIN, PERCUTANEOUS APPROACH: ICD-10-PCS | Performed by: EMERGENCY MEDICINE

## 2020-12-14 PROCEDURE — 96368 THER/DIAG CONCURRENT INF: CPT

## 2020-12-14 PROCEDURE — C1751 CATH, INF, PER/CENT/MIDLINE: HCPCS

## 2020-12-14 PROCEDURE — 770022 HCHG ROOM/CARE - ICU (200)

## 2020-12-14 PROCEDURE — 93005 ELECTROCARDIOGRAM TRACING: CPT | Performed by: EMERGENCY MEDICINE

## 2020-12-14 PROCEDURE — 85610 PROTHROMBIN TIME: CPT

## 2020-12-14 PROCEDURE — 36556 INSERT NON-TUNNEL CV CATH: CPT

## 2020-12-14 PROCEDURE — 83605 ASSAY OF LACTIC ACID: CPT

## 2020-12-14 PROCEDURE — 700101 HCHG RX REV CODE 250: Performed by: INTERNAL MEDICINE

## 2020-12-14 PROCEDURE — 0241U HCHG SARS-COV-2 COVID-19 NFCT DS RESP RNA 4 TRGT MIC: CPT

## 2020-12-14 PROCEDURE — 85730 THROMBOPLASTIN TIME PARTIAL: CPT

## 2020-12-14 PROCEDURE — 0F9430Z DRAINAGE OF GALLBLADDER WITH DRAINAGE DEVICE, PERCUTANEOUS APPROACH: ICD-10-PCS | Performed by: RADIOLOGY

## 2020-12-14 PROCEDURE — 87070 CULTURE OTHR SPECIMN AEROBIC: CPT

## 2020-12-14 PROCEDURE — 83690 ASSAY OF LIPASE: CPT

## 2020-12-14 PROCEDURE — 87040 BLOOD CULTURE FOR BACTERIA: CPT

## 2020-12-14 PROCEDURE — 700111 HCHG RX REV CODE 636 W/ 250 OVERRIDE (IP): Performed by: EMERGENCY MEDICINE

## 2020-12-14 PROCEDURE — 82247 BILIRUBIN TOTAL: CPT

## 2020-12-14 PROCEDURE — 80053 COMPREHEN METABOLIC PANEL: CPT

## 2020-12-14 PROCEDURE — 700105 HCHG RX REV CODE 258: Performed by: EMERGENCY MEDICINE

## 2020-12-14 PROCEDURE — 36415 COLL VENOUS BLD VENIPUNCTURE: CPT

## 2020-12-14 PROCEDURE — 82533 TOTAL CORTISOL: CPT

## 2020-12-14 PROCEDURE — 700111 HCHG RX REV CODE 636 W/ 250 OVERRIDE (IP): Performed by: STUDENT IN AN ORGANIZED HEALTH CARE EDUCATION/TRAINING PROGRAM

## 2020-12-14 PROCEDURE — 700105 HCHG RX REV CODE 258: Performed by: INTERNAL MEDICINE

## 2020-12-14 PROCEDURE — 96366 THER/PROPH/DIAG IV INF ADDON: CPT

## 2020-12-14 PROCEDURE — 96367 TX/PROPH/DG ADDL SEQ IV INF: CPT

## 2020-12-14 PROCEDURE — 94760 N-INVAS EAR/PLS OXIMETRY 1: CPT

## 2020-12-14 PROCEDURE — 71045 X-RAY EXAM CHEST 1 VIEW: CPT

## 2020-12-14 PROCEDURE — 05JY3ZZ INSPECTION OF UPPER VEIN, PERCUTANEOUS APPROACH: ICD-10-PCS | Performed by: EMERGENCY MEDICINE

## 2020-12-14 PROCEDURE — 96375 TX/PRO/DX INJ NEW DRUG ADDON: CPT

## 2020-12-14 PROCEDURE — 99153 MOD SED SAME PHYS/QHP EA: CPT

## 2020-12-14 PROCEDURE — 700102 HCHG RX REV CODE 250 W/ 637 OVERRIDE(OP): Performed by: EMERGENCY MEDICINE

## 2020-12-14 PROCEDURE — A9270 NON-COVERED ITEM OR SERVICE: HCPCS | Performed by: EMERGENCY MEDICINE

## 2020-12-14 PROCEDURE — C9803 HOPD COVID-19 SPEC COLLECT: HCPCS | Performed by: EMERGENCY MEDICINE

## 2020-12-14 PROCEDURE — 96365 THER/PROPH/DIAG IV INF INIT: CPT

## 2020-12-14 PROCEDURE — 85025 COMPLETE CBC W/AUTO DIFF WBC: CPT

## 2020-12-14 PROCEDURE — 99291 CRITICAL CARE FIRST HOUR: CPT

## 2020-12-14 PROCEDURE — 700117 HCHG RX CONTRAST REV CODE 255: Performed by: EMERGENCY MEDICINE

## 2020-12-14 PROCEDURE — 700111 HCHG RX REV CODE 636 W/ 250 OVERRIDE (IP)

## 2020-12-14 PROCEDURE — 700101 HCHG RX REV CODE 250: Performed by: EMERGENCY MEDICINE

## 2020-12-14 PROCEDURE — 90935 HEMODIALYSIS ONE EVALUATION: CPT

## 2020-12-14 PROCEDURE — 99291 CRITICAL CARE FIRST HOUR: CPT | Performed by: INTERNAL MEDICINE

## 2020-12-14 RX ORDER — NOREPINEPHRINE BITARTRATE 0.03 MG/ML
.5-3 INJECTION, SOLUTION INTRAVENOUS CONTINUOUS
Status: DISCONTINUED | OUTPATIENT
Start: 2020-12-14 | End: 2020-12-15

## 2020-12-14 RX ORDER — NOREPINEPHRINE BITARTRATE 0.03 MG/ML
.5-3 INJECTION, SOLUTION INTRAVENOUS CONTINUOUS
Status: DISCONTINUED | OUTPATIENT
Start: 2020-12-14 | End: 2020-12-14

## 2020-12-14 RX ORDER — MORPHINE SULFATE 4 MG/ML
1 INJECTION, SOLUTION INTRAMUSCULAR; INTRAVENOUS
Status: DISCONTINUED | OUTPATIENT
Start: 2020-12-14 | End: 2020-12-19 | Stop reason: HOSPADM

## 2020-12-14 RX ORDER — DEXTROSE MONOHYDRATE 25 G/50ML
50 INJECTION, SOLUTION INTRAVENOUS ONCE
Status: COMPLETED | OUTPATIENT
Start: 2020-12-14 | End: 2020-12-15

## 2020-12-14 RX ORDER — ONDANSETRON 2 MG/ML
4 INJECTION INTRAMUSCULAR; INTRAVENOUS ONCE
Status: COMPLETED | OUTPATIENT
Start: 2020-12-14 | End: 2020-12-14

## 2020-12-14 RX ORDER — LIDOCAINE HYDROCHLORIDE 20 MG/ML
JELLY TOPICAL ONCE
Status: COMPLETED | OUTPATIENT
Start: 2020-12-14 | End: 2020-12-14

## 2020-12-14 RX ORDER — MIDODRINE HYDROCHLORIDE 5 MG/1
10 TABLET ORAL
Status: DISCONTINUED | OUTPATIENT
Start: 2020-12-14 | End: 2020-12-19 | Stop reason: HOSPADM

## 2020-12-14 RX ORDER — SODIUM CHLORIDE, SODIUM LACTATE, POTASSIUM CHLORIDE, CALCIUM CHLORIDE 600; 310; 30; 20 MG/100ML; MG/100ML; MG/100ML; MG/100ML
1000 INJECTION, SOLUTION INTRAVENOUS ONCE
Status: ACTIVE | OUTPATIENT
Start: 2020-12-14 | End: 2020-12-15

## 2020-12-14 RX ORDER — CLOTRIMAZOLE 1 %
CREAM (GRAM) TOPICAL ONCE
Status: DISPENSED | OUTPATIENT
Start: 2020-12-14 | End: 2020-12-15

## 2020-12-14 RX ORDER — SODIUM CHLORIDE, SODIUM LACTATE, POTASSIUM CHLORIDE, CALCIUM CHLORIDE 600; 310; 30; 20 MG/100ML; MG/100ML; MG/100ML; MG/100ML
500 INJECTION, SOLUTION INTRAVENOUS ONCE
Status: COMPLETED | OUTPATIENT
Start: 2020-12-14 | End: 2020-12-14

## 2020-12-14 RX ORDER — HEPARIN SODIUM 5000 [USP'U]/ML
5000 INJECTION, SOLUTION INTRAVENOUS; SUBCUTANEOUS EVERY 8 HOURS
Status: DISCONTINUED | OUTPATIENT
Start: 2020-12-14 | End: 2020-12-19 | Stop reason: HOSPADM

## 2020-12-14 RX ORDER — HEPARIN SODIUM 1000 [USP'U]/ML
1500 INJECTION, SOLUTION INTRAVENOUS; SUBCUTANEOUS
Status: DISCONTINUED | OUTPATIENT
Start: 2020-12-14 | End: 2020-12-19 | Stop reason: HOSPADM

## 2020-12-14 RX ORDER — MIDAZOLAM HYDROCHLORIDE 1 MG/ML
INJECTION INTRAMUSCULAR; INTRAVENOUS
Status: COMPLETED
Start: 2020-12-14 | End: 2020-12-14

## 2020-12-14 RX ORDER — NOREPINEPHRINE BITARTRATE 0.03 MG/ML
0-30 INJECTION, SOLUTION INTRAVENOUS ONCE
Status: COMPLETED | OUTPATIENT
Start: 2020-12-14 | End: 2020-12-14

## 2020-12-14 RX ORDER — ACETAMINOPHEN 325 MG/1
650 TABLET ORAL ONCE
Status: COMPLETED | OUTPATIENT
Start: 2020-12-14 | End: 2020-12-14

## 2020-12-14 RX ADMIN — ONDANSETRON 4 MG: 2 INJECTION INTRAMUSCULAR; INTRAVENOUS at 06:50

## 2020-12-14 RX ADMIN — IOHEXOL 100 ML: 350 INJECTION, SOLUTION INTRAVENOUS at 09:02

## 2020-12-14 RX ADMIN — VANCOMYCIN HYDROCHLORIDE 1750 MG: 500 INJECTION, POWDER, LYOPHILIZED, FOR SOLUTION INTRAVENOUS at 09:00

## 2020-12-14 RX ADMIN — SODIUM BICARBONATE 50 MEQ: 84 INJECTION, SOLUTION INTRAVENOUS at 07:58

## 2020-12-14 RX ADMIN — METRONIDAZOLE 500 MG: 500 INJECTION, SOLUTION INTRAVENOUS at 18:12

## 2020-12-14 RX ADMIN — MIDAZOLAM HYDROCHLORIDE 2 MG: 1 INJECTION, SOLUTION INTRAMUSCULAR; INTRAVENOUS at 15:13

## 2020-12-14 RX ADMIN — LIDOCAINE HYDROCHLORIDE 5 ML: 20 JELLY TOPICAL at 11:24

## 2020-12-14 RX ADMIN — HYDROCORTISONE SODIUM SUCCINATE 100 MG: 100 INJECTION, POWDER, FOR SOLUTION INTRAMUSCULAR; INTRAVENOUS at 22:39

## 2020-12-14 RX ADMIN — NOREPINEPHRINE BITARTRATE 10 MCG/MIN: 1 INJECTION INTRAVENOUS at 14:00

## 2020-12-14 RX ADMIN — PIPERACILLIN SODIUM AND TAZOBACTAM SODIUM 4.5 G: 4; .5 INJECTION, POWDER, LYOPHILIZED, FOR SOLUTION INTRAVENOUS at 07:04

## 2020-12-14 RX ADMIN — ACETAMINOPHEN 650 MG: 325 TABLET, FILM COATED ORAL at 06:49

## 2020-12-14 RX ADMIN — NOREPINEPHRINE BITARTRATE 6 MCG/MIN: 1 INJECTION INTRAVENOUS at 20:41

## 2020-12-14 RX ADMIN — SODIUM CHLORIDE, POTASSIUM CHLORIDE, SODIUM LACTATE AND CALCIUM CHLORIDE 500 ML: 600; 310; 30; 20 INJECTION, SOLUTION INTRAVENOUS at 06:54

## 2020-12-14 RX ADMIN — NOREPINEPHRINE BITARTRATE 10 MCG/MIN: 1 INJECTION INTRAVENOUS at 08:11

## 2020-12-14 RX ADMIN — CEFTRIAXONE SODIUM 2 G: 2 INJECTION, POWDER, FOR SOLUTION INTRAMUSCULAR; INTRAVENOUS at 12:02

## 2020-12-14 RX ADMIN — METRONIDAZOLE 500 MG: 500 INJECTION, SOLUTION INTRAVENOUS at 22:39

## 2020-12-14 RX ADMIN — HEPARIN SODIUM 5000 UNITS: 5000 INJECTION, SOLUTION INTRAVENOUS; SUBCUTANEOUS at 22:39

## 2020-12-14 RX ADMIN — NOREPINEPHRINE BITARTRATE 10 MCG/MIN: 1 INJECTION INTRAVENOUS at 18:11

## 2020-12-14 RX ADMIN — HEPARIN SODIUM 5000 UNITS: 5000 INJECTION, SOLUTION INTRAVENOUS; SUBCUTANEOUS at 18:18

## 2020-12-14 RX ADMIN — DEXTROSE MONOHYDRATE 50 ML: 25 INJECTION, SOLUTION INTRAVENOUS at 07:54

## 2020-12-14 RX ADMIN — HEPARIN SODIUM 1500 UNITS: 1000 INJECTION, SOLUTION INTRAVENOUS; SUBCUTANEOUS at 16:07

## 2020-12-14 RX ADMIN — FENTANYL CITRATE 25 MCG: 50 INJECTION, SOLUTION INTRAMUSCULAR; INTRAVENOUS at 06:50

## 2020-12-14 RX ADMIN — INSULIN HUMAN 5 UNITS: 100 INJECTION, SOLUTION PARENTERAL at 07:55

## 2020-12-14 ASSESSMENT — FIBROSIS 4 INDEX
FIB4 SCORE: 1.99
FIB4 SCORE: 3.69

## 2020-12-14 ASSESSMENT — ENCOUNTER SYMPTOMS
CARDIOVASCULAR NEGATIVE: 1
WEAKNESS: 1
EYES NEGATIVE: 1
MUSCULOSKELETAL NEGATIVE: 1
RESPIRATORY NEGATIVE: 1
PSYCHIATRIC NEGATIVE: 1

## 2020-12-14 ASSESSMENT — PAIN DESCRIPTION - PAIN TYPE
TYPE: ACUTE PAIN
TYPE: CHRONIC PAIN
TYPE: ACUTE PAIN
TYPE: ACUTE PAIN

## 2020-12-14 NOTE — ED NOTES
Lab called with critical result of Lactic acid at 9.7. Critical lab result read back.   Dr. Zaragoza notified of critical lab result.

## 2020-12-14 NOTE — ED NOTES
Lab called with critical result of potassium  at 6.6. and a Creatnine at 9.11. Critical lab result read back.   Dr. Zaragoza notified of critical lab result.

## 2020-12-14 NOTE — ED PROVIDER NOTES
"ED Provider Note    CHIEF COMPLAINT  Chief Complaint   Patient presents with   • Unable to Urinate       HPI  Parmjit Castelan is a 80 y.o. male who presents to the emergency department through triage with his wife for \"groin pain.\"  Patient describes pain since Friday, now intractable.  He has the urge to urinate but does not urinate and has not in months due to his end-stage renal disease, requiring dialysis.  Sharp, burning, constant pain.  Unable to sleep this weekend.  No abdominal pain or back pain.  No fever chills.  No nausea or vomiting.  No history for similar symptoms.    Patient was started on midodrine for chronically low blood pressure, first dose was Friday, per patient is wife side effects include urinary urgency, therefore he took first dose Friday, one third dose Saturday and none since thinking it was because of this.    End-stage renal disease, on hemodialysis, Monday, Wednesday, Friday.  Due today.  Complaint otherwise.  Nephrologist is Dr. Pisano.    REVIEW OF SYSTEMS  See [facility] for further details. All other systems are negative.     PAST MEDICAL HISTORY   has a past medical history of Anesthesia, Anticoagulation monitoring, special range, Aortic stenosis, Arterial leg ulcer ([facility]) (11/8/2018), Atrial flutter ([facility]) (6/12/2019), Basal cell carcinoma of left cheek (3/26/2015), BPH (7/14/2009), Bronchitis (12/25/2018), CAD (coronary artery disease) (2/20/2014), CATARACT, CHF (congestive heart failure), NYHA class II, chronic, systolic ([facility]) E F30 in setting of atrial flutter (6/13/2019), Chronic respiratory failure with hypoxia ([facility]) (5/8/2016), CKD (chronic kidney disease) stage 4, GFR 15-29 ml/min ([facility]) (1/15/2010), COPD (chronic obstructive pulmonary disease) ([facility]), Detached retina, Dialysis, EMPHYSEMA, Glaucoma, Gout, Heart burn, Heart murmur, Hypertension, Indigestion, Kidney cyst, Leg pain, bilateral (8/10/2015), On supplemental oxygen therapy, Peripheral vascular disease ([facility]) (8/10/2015), " Pneumonia, Primary insomnia (3/22/2018), Proteinuria, PVD (peripheral vascular disease) (HCC), and Sleep apnea.    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       SURGICAL HISTORY   has a past surgical history that includes cataract phaco with iol (08); av fistula creation (2010); av fistula revision (2010); av fistulogram (2010); angioplasty balloon (2010); av fistulogram (2010); angioplasty balloon (2010); vitrectomy posterior (2011); scleral buckling (2011); recovery (2011); vitrectomy posterior (10/11/2011); recovery (2012); recovery (2013); recovery (2013); av fistula thrombolysis (2013); recovery (2013); recovery (3/24/2014); recovery (2014); recovery (3/24/2015); recovery (2015); gastroscopy with biopsy (2016); colonoscopy - endo (8/15/2016); endoscopy procedure (3/21/2018); femoral endarterectomy (Left, 2019); femoral popliteal bypass (Left, 2019); angiogram (Left, 2019); other orthopedic surgery (); av fistula creation (Right, 2019); lisa (2019); cardioversion (2019); av fistula creation (Right, 2019); cath placement (Right, 2019); stent placement (2020); gastroscopy-endo (2020); small bowel endoscopy,past 2nd duod (2020); colonoscopy - endo (2020); colonoscopy,diagnostic (2020); gastroscopy (2020); capsule endoscopy administration (N/A, 2020); capsule endoscopy retrieval (2020); colonoscopy,diagnostic (N/A, 2020); gastroscopy (N/A,  "2/23/2020); colonoscopy,flex,w/control, bleeding (N/A, 2/24/2020); gastroscopy w/push enterscopy (N/A, 2/24/2020); and gastroscopy-endo (11/17/2020).    CURRENT MEDICATIONS  Home Medications     Reviewed by Mee Gambino (Pharmacy Tech) on 12/14/20 at 0746  Med List Status: Complete   Medication Last Dose Status   albuterol 108 (90 Base) MCG/ACT Aero Soln inhalation aerosol > 3 days Active   B Complex-C-Folic Acid (DIALYVITE PO) 12/12/2020 Active   calcium acetate (PHOS-LO) 667 MG Cap 12/12/2020 Active   fluticasone (FLONASE) 50 MCG/ACT nasal spray 12/12/2020 Active   midodrine (PROAMATINE) 10 MG tablet 12/12/2020 Active   montelukast (SINGULAIR) 10 MG Tab 12/13/2020 Active   omeprazole (PRILOSEC) 40 MG delayed-release capsule 12/12/2020 Active   ROPINIRole (REQUIP) 0.5 MG Tab 12/12/2020 Active   rosuvastatin (CRESTOR) 40 MG tablet 12/13/2020 Active   tamsulosin (FLOMAX) 0.4 MG capsule 12/13/2020 Active   torsemide (DEMADEX) 10 MG tablet 12/10/2020 Active   traZODone (DESYREL) 150 MG Tab 12/12/2020 Active                ALLERGIES  No Known Allergies    PHYSICAL EXAM  VITAL SIGNS: /53   Pulse 69   Temp 36.6 °C (97.9 °F) (Temporal)   Resp (!) 23   Ht 1.727 m (5' 8\")   Wt 74.3 kg (163 lb 12.8 oz)   SpO2 96%   BMI 24.91 kg/m²   Pulse ox interpretation: I interpret this pulse ox as normal.  Constitutional: Alert.  Mild distress secondary discomfort.  HENT: Normocephalic, atraumatic. Bilateral external ears normal, Nose normal.  Dry mucous membranes.    Eyes: Pupils are equal and reactive, Conjunctiva normal.   Neck: Normal range of motion, Supple  Lymphatic: No lymphadenopathy noted.  No inguinal mass or lymphadenopathy.  Cardiovascular: Regular rate and rhythm, no murmurs. Distal pulses intact.  No peripheral edema.  Thorax & Lungs: Normal breath sounds.  No wheezing/rales/ronchi. No increased work of breathing, clipped speech or retractions.   Abdomen: Soft, non-distended, non-tender to palpation. " No palpable or pulsatile masses. No peritoneal signs. No CVA tenderness.  : Uncircumcised.  Dried blood, exquisite tenderness to palpation at the tip of the penis, foreskin.  No swelling or discoloration otherwise.  No tenderness with palpation at the shaft, base or testicles.  No scrotal swelling or discoloration.  No crepitus.  No erythema or fluctuance.  Skin: Warm, Dry, No erythema, No rash.   Musculoskeletal: Good range of motion in all major joints. No major deformities noted.   Neurologic: Alert and orient x4.  Moves 4 extremity spontaneously.  Psychiatric: Affect normal, Judgment normal, Mood normal.       DIAGNOSTIC STUDIES / PROCEDURES    LABS  Results for orders placed or performed during the hospital encounter of 12/14/20   CBC w/ Differential   Result Value Ref Range    WBC 11.1 (H) 4.8 - 10.8 K/uL    RBC 2.52 (L) 4.70 - 6.10 M/uL    Hemoglobin 8.1 (L) 14.0 - 18.0 g/dL    Hematocrit 28.4 (L) 42.0 - 52.0 %    .7 (H) 81.4 - 97.8 fL    MCH 32.1 27.0 - 33.0 pg    MCHC 28.5 (L) 33.7 - 35.3 g/dL    RDW 79.4 (H) 35.9 - 50.0 fL    Platelet Count 211 164 - 446 K/uL    MPV 10.9 9.0 - 12.9 fL    Neutrophils-Polys 87.90 (H) 44.00 - 72.00 %    Lymphocytes 3.90 (L) 22.00 - 41.00 %    Monocytes 7.30 0.00 - 13.40 %    Eosinophils 0.10 0.00 - 6.90 %    Basophils 0.20 0.00 - 1.80 %    Immature Granulocytes 0.60 0.00 - 0.90 %    Nucleated RBC 0.30 /100 WBC    Neutrophils (Absolute) 9.73 (H) 1.82 - 7.42 K/uL    Lymphs (Absolute) 0.43 (L) 1.00 - 4.80 K/uL    Monos (Absolute) 0.81 0.00 - 0.85 K/uL    Eos (Absolute) 0.01 0.00 - 0.51 K/uL    Baso (Absolute) 0.02 0.00 - 0.12 K/uL    Immature Granulocytes (abs) 0.07 0.00 - 0.11 K/uL    NRBC (Absolute) 0.03 K/uL   Complete Metabolic Panel (CMP)   Result Value Ref Range    Sodium 135 135 - 145 mmol/L    Potassium 6.6 (HH) 3.6 - 5.5 mmol/L    Chloride 89 (L) 96 - 112 mmol/L    Co2 14 (L) 20 - 33 mmol/L    Anion Gap 32.0 (H) 7.0 - 16.0    Glucose 79 65 - 99 mg/dL    Bun 69  (H) 8 - 22 mg/dL    Creatinine 9.11 (HH) 0.50 - 1.40 mg/dL    Calcium 10.5 (H) 8.4 - 10.2 mg/dL    AST(SGOT) 84 (H) 12 - 45 U/L    ALT(SGPT) 71 (H) 2 - 50 U/L    Alkaline Phosphatase 122 (H) 30 - 99 U/L    Total Bilirubin 1.0 0.1 - 1.5 mg/dL    Albumin 4.0 3.2 - 4.9 g/dL    Total Protein 7.1 6.0 - 8.2 g/dL    Globulin 3.1 1.9 - 3.5 g/dL    A-G Ratio 1.3 g/dL   LACTIC ACID   Result Value Ref Range    Lactic Acid 9.7 (HH) 0.5 - 2.0 mmol/L   LACTIC ACID   Result Value Ref Range    Lactic Acid 8.1 (HH) 0.5 - 2.0 mmol/L   ESTIMATED GFR   Result Value Ref Range    GFR If  7 (A) >60 mL/min/1.73 m 2    GFR If Non African American 6 (A) >60 mL/min/1.73 m 2   COVID/SARS CoV-2 PCR    Specimen: Nasopharyngeal; Respirate   Result Value Ref Range    COVID Order Status Received    CoV-2, Flu A/B, And RSV by PCR   Result Value Ref Range    Influenza virus A RNA Negative Negative    Influenza virus B, PCR Negative Negative    RSV, PCR Negative Negative    SARS-CoV-2 by PCR NotDetected     SARS-CoV-2 Source NP Swab    EKG   Result Value Ref Range    Report       [facility] Emergency Dept.    Test Date:  2020  Pt Name:    EDVIN SALAZARCHERI       Department: [facility]  MRN:        [id number]                      Room:       [place] [address]  Gender:     Male                         Technician: [id number]  :        [id number]                   Requested By:CATINA VALLADARES  Order #:    [id number]                    Reading MD: CATINA VALLADARES, DO    Measurements  Intervals                                Axis  Rate:       78                           P:          50  HI:         176                          QRS:        -57  QRSD:       138                          T:          137  QT:         404  QTc:        461    Interpretive Statements  SINUS RHYTHM  RIGHT BUNDLE BRANCH BLOCK  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 INFARCT, OLD  Compared to ECG 2020 14:41:00  Myocardial infarct finding now present  Atrial  premature complex(es) no longer present  ST (T wave) deviation no longer present  Electronically Signed On 12- 8:37:10 PST by JOSE VALLADARES DO       *Note: Due to a large number of results and/or encounters for the requested time period, some results have not been displayed. A complete set of results can be found in Results Review.     RADIOLOGY  CT-ABDOMEN-PELVIS WITH   Final Result      1.  Normal CT appearance of the perineum with no evidence of herniated gangrene      2.  New small perihepatic, pericholecystic, right anterior pararenal space and pelvic ascites with new moderate gallbladder wall thickening and adjacent fat stranding. This is worrisome for acute cholecystitis although hepatocellular disease can also    cause gallbladder wall thickening      3.  Mildly lobular hepatic contour is indicates cirrhosis, unchanged and there are some hepatic hypodense masses most consistent with cysts      4.  Left adrenal gland thickening is stable and could be from hyperplasia or more likely small masses      5.  Aortic valve calcification compatible with aortic stenosis and there is multichamber cardiac enlargement      6.  Right pleural fluid small estimated volume has decreased from comparison but there is new pleural thickening which could indicate empyema formation or disorganization      7.  Moderate colonic diverticulosis without evidence of diverticulitis      8.  Right lower lobe chronic pulmonary consolidation could be from round atelectasis favored over consolidation from aspiration, pneumonia or mass      Findings were discussed with CATINA VALLADARES on 12/14/2020 9:54 AM.      DX-CHEST-FOR LINE PLACEMENT Perform procedure in: Other(comment f6 below):   Final Result      Left IJ line tip overlies the brachiocephalic confluence and no pneumothorax is identified      Stable small right pleural effusion and hazy basilar opacity could be from atelectasis or consolidation      Stable moderate cardiac  silhouette enlargement      DX-CHEST-PORTABLE (1 VIEW)   Final Result         1.  Hazy right pulmonary atelectasis versus infiltrates, stable.   2.  Small right pleural effusion, stable   3.  Cardiomegaly   4.  Atherosclerosis          PROCEDURES  CENTRAL LINE PROCEDURE NOTE: (with ultrasound guidance)  The patient was consented all risks benefits and alternatives were discussed.  LOCATION: Left internal jugular  POSITION: trendelenburg.  PROCEDURE NOTE:  Maximal Sterile Barrier Technique was used. The vascular triangle was identified. Local anesthesia 1% lidocaine 3cc was infiltrated with a 27 gauge needle. Using ultrasound guidance, and Sterile Ultrasound Technique, the left internal jugular vein was canulated with good flow of dark, non-pulsatile blood. At no point in the procedure was air aspirated with needle introduction. Wire was passed without difficulty. Dilation was performed. The central line catheter was advanced via Seldinger technique without difficulty and the guidewire was removed intact. The line was secured in place using 4-0 nylon suture. Air in the various ports was evacuated and the lines were flushed by me. Patient tolerated the procedure well there were no complications. A chest x-ray was ordered prior to use of the IV.    Of note, the right internal jugular was accessed in similar fashion initially.  2 attempts with dark nonpulsatile blood however the wire would not advance.  Therefore attempt made on the left, first attempt with cannulation as described above.    POST-PROCEDURE CXR:  8:39 AM There is no pneumothorax present. The tip of the line is well positioned at the SVC /RA junction.      COURSE & MEDICAL DECISION MAKING  Nursing notes and vital signs were reviewed. (See chart for details)  The patients records were reviewed, history was obtained from the patient;     Patient presents with urge to urinate, although does not normally urinate.  Bladder scan with only 100 mL.    0610 -  "Patient was seen and evaluated at bedside.  Pain is limited to only the tip of the penis on my exam as opposed to the \"groin\" in the HPI.  Clinical exam is suggestive of balanitis, he is tender with dried blood, although absence of other discharge, at the tip of the uncircumcised penis.  However his pain appears to be out of proportion to these findings.  Physical exam is otherwise unremarkable, abdominal exam, scrotal exam within normal limits.  Hemodynamically stable, chronically low blood pressure appears stable at this time as well.  Add CBC, lactate.      0630 -lactate 9.7.  Add broad-spectrum antibiotics, gentle IV fluid bolus given history of ESRD requiring dialysis and due today although clinically not fluid overloaded.  Await chest x-ray.  Expand work-up to include additional labs, CT of the abdomen and pelvis, evaluate for penile pain out of proportion, concern for necrotizing fasciitis although no clinical cutaneous exam consistent with that.    0710 -patient having downtrending blood pressure.  Systolics 90s.  Continue gentle IV fluid.    0740 --persistent hypotension.  Continue LR fluid bolus at 1 L.  Initiate Levophed peripherally.  Will place central venous catheter.  Awaiting CT scan.    8:23 AM left IJ central venous catheter placed as described above without complication.  Peripheral Levophed with improvement in blood pressure.  Patient tolerated procedure well, continues to mentate well.  Awaiting CT.    8:39 AM Line ok to use.  Blood pressure improving with Levophed, will titrate.  Continue for total 1 L IV fluid bolus.  Awaiting CT.    9:50 AM radiologist calls with findings from CT consistent with acute cholecystitis.  New from previous CT, other findings are chronic.  No discernible pelvic pathology.  Patient reevaluated bedside, abdominal exam is still benign, no tenderness in the right upper quadrant.  Review of labs, mild leukocytosis, obvious lactic acidosis as described and treated " earlier.  It is trending down, from 9.7-8.1 on reevaluation.  He has a mild transaminitis with normal total bilirubin.  Lipase added.  Cultures obviously pending as well.  Cannot exclude bacteremia, patient has had recent hospitalization, and peripheral access with dialysis.  Intensivist, general surgery, nephrology paged.    10:16 AM Dr. Najera is aware of the patient agreeable to admission.    10:34 AM Dr. Martines, scrubbed in at [facility], spoke with her nurse, is aware the patient agreeable to consultation.    11:01 AM Dr. Martines is aware of the patient and CT findings.  She requests an urgent percutaneous cholecystotomy.  Agreeable to consultation otherwise.    11:08 AM page out to Dr. Ramires, IR on call, to see if this procedure can be done at [facility] today.    11:15 AM Dr. Beatty is aware of the patient agreeable to consultation and will arrange for dialysis.    11:43 AM Dr. Stovall is aware of the patient agreeable to consultation and will plan for percutaneous cholecystotomy this afternoon.  Covid is already negative.  Coags added.    The total critical care time on this patient is 95 minutes, immediate and continuous hemodynamic monitoring and multiple bedside evaluations, resuscitating patient and assessing response to treatment, deciphering test results, speaking with admitting and consulting physician, and arranging for ICU hospital admission. This 95 minutes is exclusive of separately billable procedures.      FINAL IMPRESSION  (A41.9,  R65.21) Septic shock (HCC)  (R10.2) Pelvic pain  (N48.89) Penile pain  (K81.0) Acute cholecystitis  (N48.1) Balanitis      Electronically signed by: Kate Zaragoza D.O., 12/14/2020 7:29 AM      This dictation was created using voice recognition software. The accuracy of the dictation is limited to the abilities of the software. I expect there may be some errors of grammar and possibly content. The nursing notes were reviewed and certain aspects of this  information were incorporated into this note.

## 2020-12-14 NOTE — ED TRIAGE NOTES
Pt to room in wheelchair with wife  Dc from hospital on thursday  Started on midodrine, feel the urgent feeling to void but has not voided in weeks, last dialysis Friday    Pt A & 0 x 4, speech clear    COVID-19 screening criteria completed, pt denies high risk travel and denies contact with COVID-19 positive pt    Pt oriented to room, call light within reach, kris in lowest position    Bladder scan completed max 100 ml

## 2020-12-14 NOTE — H&P
Pulmonary History & Physical Note    Date of Service  12/14/2020    Primary Care Physician  Maryse Hartman M.D.    Consultants  Nephrology and Gen Surgery (contacted by EP)    Code Status  Full Code    Chief Complaint  Chief Complaint   Patient presents with   • Unable to Urinate       History of Presenting Illness  80 y.o. male who presented 12/14/2020 with groin pain with urge to urinate. No obvious cause of it.  No abdominal pain.  Lactic was elevated and so abdominal CT done concerning for acute cholecystitis. AST and ALT slightly elevated but TB is normal Alk phos 122.  RLL infiltrate  Dr. Martines consulted from general surgery unclear if from primary cholecystitis.  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   HENT: Negative.    Eyes: Negative.    Respiratory: Negative.    Cardiovascular: Negative.    Genitourinary:        Urge to urinate has resolved  Penile pain is minimal   Musculoskeletal: Negative.    Skin: Negative.    Neurological: Positive for weakness.   Endo/Heme/Allergies: Negative.    Psychiatric/Behavioral: Negative.        Past Medical History   has a past medical history of Anesthesia, Anticoagulation monitoring, special range, Aortic stenosis, Arterial leg ulcer ([facility]) (11/8/2018), Atrial flutter ([facility]) (6/12/2019), Basal cell carcinoma of left cheek (3/26/2015), BPH (7/14/2009), Bronchitis (12/25/2018), CAD (coronary artery disease) (2/20/2014), CATARACT, CHF (congestive heart failure), NYHA class II, chronic, systolic ([facility]) E F30 in setting of atrial flutter (6/13/2019), Chronic respiratory failure with hypoxia ([facility]) (5/8/2016), CKD (chronic kidney disease) stage 4, GFR 15-29 ml/min ([facility]) (1/15/2010), COPD  (chronic obstructive pulmonary disease) (HCC), Detached retina, Dialysis, EMPHYSEMA, Glaucoma, Gout, Heart burn, Heart murmur, Hypertension, Indigestion, Kidney cyst, Leg pain, bilateral (8/10/2015), On supplemental oxygen therapy, Peripheral vascular disease (HCC) (8/10/2015), Pneumonia, Primary insomnia (3/22/2018), Proteinuria, PVD (peripheral vascular disease) ([facility]), and Sleep apnea.    Surgical History   has a past surgical history that includes cataract phaco with iol (4/8/08); av fistula creation (2/12/2010); av fistula revision (2/19/2010); av fistulogram (7/23/2010); angioplasty balloon (7/23/2010); av fistulogram (9/17/2010); angioplasty balloon (9/17/2010); vitrectomy posterior (1/18/2011); scleral buckling (1/18/2011); recovery (8/12/2011); vitrectomy posterior (10/11/2011); recovery (7/23/2012); recovery (1/29/2013); recovery (7/2/2013); av fistula thrombolysis (7/2/2013); recovery (12/17/2013); recovery (3/24/2014); recovery (7/29/2014); recovery (3/24/2015); recovery (12/23/2015); gastroscopy with biopsy (8/13/2016); colonoscopy - endo (8/15/2016); endoscopy procedure (3/21/2018); femoral endarterectomy (Left, 1/25/2019); femoral popliteal bypass (Left, 1/25/2019); angiogram (Left, 1/25/2019); other orthopedic surgery (1998); av fistula creation (Right, 2/21/2019); lisa (6/13/2019); cardioversion (6/13/2019); av fistula creation (Right, 8/6/2019); cath placement (Right, 8/6/2019); stent placement (01/26/2020); gastroscopy-endo (2/7/2020); pr small bowel endoscopy,past 2nd duod (2/19/2020); colonoscopy - endo (2/19/2020); pr colonoscopy,diagnostic (2/19/2020); gastroscopy (2/19/2020); capsule endoscopy administration (N/A, 2/20/2020); capsule endoscopy retrieval (2/20/2020); pr colonoscopy,diagnostic (N/A, 2/23/2020); gastroscopy (N/A, 2/23/2020); pr colonoscopy,flex,w/control, bleeding (N/A, 2/24/2020); gastroscopy w/push enterscopy (N/A, 2/24/2020); and gastroscopy-endo (11/17/2020).     Family  History  family history includes Heart Disease in his brother; Hypertension in his brother, mother, and son; Lung Disease in his father; No Known Problems in his son; Stroke in his mother.     Social History   reports that he quit smoking about 11 years ago. His smoking use included cigarettes. He has a 40.00 pack-year smoking history. He has never used smokeless tobacco. He reports that he does not drink alcohol or use drugs.    Allergies  No Known Allergies    Medications  Prior to Admission Medications   Prescriptions Last Dose Informant Patient Reported? Taking?   B Complex-C-Folic Acid (DIALYVITE PO) 12/12/2020 at PM Significant Other Yes No   Sig: Take 1 Tab by mouth every evening.   ROPINIRole (REQUIP) 0.5 MG Tab 12/12/2020 at PM Significant Other No No   Sig: Take 2 Tabs by mouth every bedtime.   albuterol 108 (90 Base) MCG/ACT Aero Soln inhalation aerosol > 3 days at Unknown Significant Other No No   Sig: Inhale 2 Puffs by mouth every four hours as needed for Shortness of Breath.   calcium acetate (PHOS-LO) 667 MG Cap 12/12/2020 at PM Significant Other Yes No   Sig: Take 1,334 mg by mouth see administration instructions. Pt reports that he takes 2 cap for each snack and 2 cap 3 times a day with meals   fluticasone (FLONASE) 50 MCG/ACT nasal spray 12/12/2020 at AM Significant Other No No   Sig: SPRAY ONE TO TWO SPRAYS IN EACH NOSTRIL ONCE DAILY *FLONASE*   Patient taking differently: Administer 1-2 Sprays into affected nostril(S) every day. SPRAY ONE TO TWO SPRAYS IN EACH NOSTRIL ONCE DAILY *FLONASE*   midodrine (PROAMATINE) 10 MG tablet 12/12/2020 at PM Significant Other No No   Sig: Take 1 Tab by mouth 3 times a day, with meals.   Patient taking differently: Take 10 mg by mouth 3 times a day, with meals. Pt just started this medication on 12/11/2020   montelukast (SINGULAIR) 10 MG Tab 12/13/2020 at 2000 Significant Other No No   Sig: Take 1 Tab by mouth every day.   Patient taking differently: Take 10 mg  by mouth every evening.   omeprazole (PRILOSEC) 40 MG delayed-release capsule 12/12/2020 at AM Significant Other No No   Sig: Take 1 Cap by mouth every day.   rosuvastatin (CRESTOR) 40 MG tablet 12/13/2020 at 2000 Significant Other No No   Sig: Take 1 Tab by mouth every evening.   tamsulosin (FLOMAX) 0.4 MG capsule 12/13/2020 at 2000 Significant Other No No   Sig: Take 2 Caps by mouth every evening.   torsemide (DEMADEX) 10 MG tablet 12/10/2020 at STOPPED Significant Other No No   Sig: Take 3 Tabs by mouth every day.   Patient taking differently: Take 30 mg by mouth every morning. 3 tablets = 30 mg   traZODone (DESYREL) 150 MG Tab 12/12/2020 at PM Significant Other No No   Sig: Take 1 Tab by mouth See Admin Instructions. Pt takes 150MG @ 2130 and another 150MG @ 0130      Facility-Administered Medications: None       Physical Exam  Temp:  [35.9 °C (96.7 °F)-36.6 °C (97.9 °F)] 35.9 °C (96.7 °F)  Pulse:  [0-86] 71  Resp:  [16-44] 26  BP: ()/(39-71) 113/51  SpO2:  [88 %-99 %] 93 %    Physical Exam  Constitutional:       Appearance: He is ill-appearing.   HENT:      Head: Normocephalic and atraumatic.      Mouth/Throat:      Mouth: Mucous membranes are dry.   Eyes:      Extraocular Movements: Extraocular movements intact.      Pupils: Pupils are equal, round, and reactive to light.   Cardiovascular:      Rate and Rhythm: Normal rate.      Heart sounds: Murmur present.   Pulmonary:      Effort: Pulmonary effort is normal.      Breath sounds: Normal breath sounds.   Abdominal:      General: Bowel sounds are normal.      Palpations: Abdomen is soft.      Comments: Gall bladder drain   Skin:     General: Skin is warm and dry.   Neurological:      General: No focal deficit present.      Mental Status: He is alert.   Psychiatric:         Mood and Affect: Mood normal.         Behavior: Behavior normal.         Thought Content: Thought content normal.         Judgment: Judgment normal.         Laboratory:  Recent Labs      12/14/20  0611   WBC 11.1*   RBC 2.52*   HEMOGLOBIN 8.1*   HEMATOCRIT 28.4*   .7*   MCH 32.1   MCHC 28.5*   RDW 79.4*   PLATELETCT 211   MPV 10.9     Recent Labs     12/14/20  0611   SODIUM 135   POTASSIUM 6.6*   CHLORIDE 89*   CO2 14*   GLUCOSE 79   BUN 69*   CREATININE 9.11*   CALCIUM 10.5*     Recent Labs     12/14/20  0611 12/14/20  0820   ALTSGPT 71*  --    ASTSGOT 84*  --    ALKPHOSPHAT 122*  --    TBILIRUBIN 1.0  --    LIPASE  --  63*   GLUCOSE 79  --      Recent Labs     12/14/20  1205   APTT 34.9   INR 1.55*     No results for input(s): NTPROBNP in the last 72 hours.      No results for input(s): TROPONINT in the last 72 hours.    Imaging:  CT-ABDOMEN-PELVIS WITH   Final Result      1.  Normal CT appearance of the perineum with no evidence of herniated gangrene      2.  New small perihepatic, pericholecystic, right anterior pararenal space and pelvic ascites with new moderate gallbladder wall thickening and adjacent fat stranding. This is worrisome for acute cholecystitis although hepatocellular disease can also    cause gallbladder wall thickening      3.  Mildly lobular hepatic contour is indicates cirrhosis, unchanged and there are some hepatic hypodense masses most consistent with cysts      4.  Left adrenal gland thickening is stable and could be from hyperplasia or more likely small masses      5.  Aortic valve calcification compatible with aortic stenosis and there is multichamber cardiac enlargement      6.  Right pleural fluid small estimated volume has decreased from comparison but there is new pleural thickening which could indicate empyema formation or disorganization      7.  Moderate colonic diverticulosis without evidence of diverticulitis      8.  Right lower lobe chronic pulmonary consolidation could be from round atelectasis favored over consolidation from aspiration, pneumonia or mass      Findings were discussed with CATINA VALLADARES on 12/14/2020 9:54 AM.      DX-CHEST-FOR LINE  PLACEMENT Perform procedure in: Other(comment f6 below):   Final Result      Left IJ line tip overlies the brachiocephalic confluence and no pneumothorax is identified      Stable small right pleural effusion and hazy basilar opacity could be from atelectasis or consolidation      Stable moderate cardiac silhouette enlargement      DX-CHEST-PORTABLE (1 VIEW)   Final Result         1.  Hazy right pulmonary atelectasis versus infiltrates, stable.   2.  Small right pleural effusion, stable   3.  Cardiomegaly   4.  Atherosclerosis      CT-DRAIN-CHOLECTYSTOSTOMY    (Results Pending)         Assessment/Plan:  I anticipate this patient will require at least two midnights for appropriate medical management, necessitating inpatient admission.    Acalculous cholecystitis  Assessment & Plan  With no abdominal pain, elevated LFTs and CT c/w with gall bladder wall thickening  Appreciate Dr. Lopez' help  Percutanous Gall bladder drain placed by Dr. Stovall in IR  Await culture results  Ceftriaxone and metronidazole   Lactic already down to 3.8 and levophed being titrated down    Balanitis  Assessment & Plan  Unclear  If does not resolve will need to consult urology  Lidocaine to site prn  Abx    Chronic systolic congestive heart failure (HCC)- (present on admission)  Assessment & Plan  Holding BP meds as on levophed  Restart midodrine    ESRD (end stage renal disease) (HCC)- (present on admission)  Assessment & Plan  Dialysis M/W/F  Receiving    Lactic acidosis- (present on admission)  Assessment & Plan  Due to hypotension and started on levophed  Unable to give dx of sepsis as only one SIRS criteria met which was tachypnea  Fluids given and lactic titrated  Blood cx and gall bladder cx    Heparin for DVT prophylaxis  IJ placed in the ER    D/w Dr. Zaragoza  CC time 34 mins    Full Code  Family Updated

## 2020-12-14 NOTE — ED NOTES
Med rec updated and complete  Allergies reviewed  Interviewed pt with wife at bedside with permission from pt.  Pts wife reports that pt just started MIDODRINE 10MG 1 tablet 3 times a day with meals on 12/11/2020.  Pt reports no antibiotics in the last 2 weeks

## 2020-12-14 NOTE — CONSULTS
CONSULT NOTE    PATIENT ID  Name:             Parmjit Castelan     YOB: 1940  Age:                 80 y.o.  male   MRN:               [id number]    CHIEF COMPLAINT:        Penile pain    HISTORY OF PRESENT ILLNESS:  80-year-old male with multiple comorbidities presenting to the ER department with a 3-day history of penile pain.  He reports that this pain has been worsening.  The patient does not void and is currently in end-stage renal disease on dialysis.  He reports an urge to void but no output.  He denies any abdominal pain or pain elsewhere.  He presented today for worsening pain and for feeling unwell.  He denies any nausea or vomiting.  He denies any change to bowel habit.    The patient was noted to have hypotension but no evidence of tachycardia.  He is afebrile has a mildly elevated white blood cell count with mildly elevated LFTs and a normal bilirubin.  He was started on vasopressors due to his hypotension, however he is known to have chronic hypotension and was recently started on midodrine.  He reports some shortness of breath and is currently on oxygen by nasal prongs.    The patient denies any history of alcohol abuse or hepatitis as causes of his cirrhosis demonstrated on CT scan.  He denies any previous episodes of right upper quadrant abdominal pain.  He has never had issues with his gallbladder.    REVIEW OF SYSTEMS:   Constitutional: Denies unintended weight change, night sweats, fatigue  Eyes:   Denies eye pain, redness, discharge, vision changes  Ears/Nose/Throat/Mouth: Denies hearing changes, ear pain, nasal congestion, sore throat, dysphagia  Cardiovascular:  Denies Chest pain, shortness of breath, orthopnea, palpitations, claudication  Respiratory:  Denies cough, sputum, wheezing, dyspnea  Gastrointestinal/Hepatic:  Denies dysphagia, melena, jaundice, hematochezia, changing heartburn  Genitourinary:  Denies dysuria, increased frequency, hematuria,  "urgency  Musculoskeletal/Rheum: Denies changing arthralgias, myalgias, joint swelling, joint stiffness  Skin/Breast: Denies changing skin lesions, pruritis, nipple discharge, hair changes  Neurological: Denies weakness, numbness, paresthesia, syncope, dizziness  Pyschiatric: Denies acute depression, anxiety, insomnia, personality changes, delusions  Endocrine: Denies temperature intolerance, polydipsia, polyuria  Heme/Oncology/Lymph Nodes: Denies easy bruising, bleeding, lymphadenopathy  All other systems were reviewed and are negative                 Past Medical History:   Past Medical History:   Diagnosis Date   • Anesthesia     \"needs more sedation\" (can sometimes hear MD during procedure)    • Anticoagulation monitoring, special range    • Aortic stenosis     mod AS- concern for low flow severe AS with rEFof 30%   • Arterial leg ulcer ([facility]) 11/8/2018   • Atrial flutter ([facility]) 6/12/2019   • Basal cell carcinoma of left cheek 3/26/2015   • BPH 7/14/2009   • Bronchitis 12/25/2018   • CAD (coronary artery disease) 2/20/2014   • CATARACT     sanjuanita surgery complete   • CHF (congestive heart failure), NYHA class II, chronic, systolic ([facility]) E F30 in setting of atrial flutter 6/13/2019   • Chronic respiratory failure with hypoxia ([facility]) 5/8/2016   • CKD (chronic kidney disease) stage 4, GFR 15-29 ml/min ([facility]) 1/15/2010    end stage renal disease   • COPD (chronic obstructive pulmonary disease) ([facility])     wears 2.5 L o2 sometimes when he sleeps   • Detached retina    • Dialysis     m,w,f [facility]   • EMPHYSEMA    • Glaucoma     Early stage   • Gout    • Heart burn     occas   • Heart murmur     maria esther lee cardiologist   • Hypertension    • Indigestion     occas   • Kidney cyst    • Leg pain, bilateral 8/10/2015   • On supplemental oxygen therapy    • Peripheral vascular disease ([facility]) 8/10/2015   • Pneumonia    • Primary insomnia 3/22/2018   • Proteinuria    • PVD (peripheral vascular disease) ([facility])    • Sleep " apnea     O2 PER CANULA  AT NIGHT 2l/m       Past Surgical History:  Past Surgical History:   Procedure Laterality Date   • GASTROSCOPY-ENDO  11/17/2020    Procedure: GASTROSCOPY;  Surgeon: Juan Matthews M.D.;  Location: SURGERY [facility];  Service: Gastroenterology   • PB COLONOSCOPY,FLEX,W/CONTROL, BLEEDING N/A 2/24/2020    Procedure: COLONOSCOPY, WITH ARGON PLASMA COAGULATION;  Surgeon: Juan Matthews M.D.;  Location: SURGERY SAME DAY [facility];  Service: Gastroenterology   • GASTROSCOPY W/PUSH ENTERSCOPY N/A 2/24/2020    Procedure: GASTROSCOPY, WITH PUSH ENTEROSCOPY;  Surgeon: Juan Matthews M.D.;  Location: SURGERY SAME DAY [facility]S;  Service: Gastroenterology   • PB COLONOSCOPY,DIAGNOSTIC N/A 2/23/2020    Procedure: COLONOSCOPY;  Surgeon: Enrique Child D.O.;  Location: SURGERY [facility];  Service: Gastroenterology   • GASTROSCOPY N/A 2/23/2020    Procedure: GASTROSCOPY;  Surgeon: Enrique Child D.O.;  Location: SURGERY [facility];  Service: Gastroenterology   • CAPSULE ENDOSCOPY ADMINISTRATION N/A 2/20/2020    Procedure: ADMINISTRATION, CAPSULE, FOR CAPSULE ENDOSCOPY;  Surgeon: Gucci Westbrook M.D.;  Location: ENDOSCOPY [facility];  Service: Gastroenterology   • CAPSULE ENDOSCOPY RETRIEVAL  2/20/2020    Procedure: CAPSULE ENDOSCOPY RETRIEVAL;  Surgeon: Gucci Westbrook M.D.;  Location: ENDOSCOPY [facility];  Service: Gastroenterology   • PB SMALL BOWEL ENDOSCOPY,PAST 2ND DUOD  2/19/2020        • COLONOSCOPY - ENDO  2/19/2020        • PB COLONOSCOPY,DIAGNOSTIC  2/19/2020    Procedure: COLONOSCOPY;  Surgeon: Gucci Westbrook M.D.;  Location: SURGERY [facility];  Service: Gastroenterology   • GASTROSCOPY  2/19/2020    Procedure: GASTROSCOPY;  Surgeon: Gucci Westbrook M.D.;  Location: [facility];  Service: Gastroenterology   • GASTROSCOPY-ENDO  2/7/2020    Procedure: GASTROSCOPY;  Surgeon: Jong Carrasquillo M.D.;  Location: ENDOSCOPY [facility];  Service: Gastroenterology   • STENT PLACEMENT  01/26/2020    lda   • AV FISTULA CREATION Right 8/6/2019    Procedure: CREATION, AV FISTULA -  RIGHT ARM  ,  and Ligation of Left Arm Fistula;  Surgeon: Sera Peters M.D.;  Location: SURGERY [facility];  Service: General   • CATH PLACEMENT Right 8/6/2019    Procedure: INSERTION, CATHETER - PERMA ,;  Surgeon: Sera Peters M.D.;  Location: SURGERY [facility];  Service: General   • JUSTIN  6/13/2019    Procedure: ECHOCARDIOGRAM, TRANSESOPHAGEAL;  Surgeon: Harvinder Hoang M.D.;  Location: [facility];  Service: Cardiac   • CARDIOVERSION  6/13/2019    Procedure: CARDIOVERSION;  Surgeon: Harvinder Hoang M.D.;  Location: [facility];  Service: Cardiac   • AV FISTULA CREATION Right 2/21/2019    Procedure: AV FISTULA CREATION - ARM;  Surgeon: David Cason M.D.;  Location: SURGERY [facility];  Service: General   • FEMORAL ENDARTERECTOMY Left 1/25/2019    Procedure: FEMORAL ENDARTERECTOMY;  Surgeon: David Cason M.D.;  Location: SURGERY [facility];  Service: General   • FEMORAL POPLITEAL BYPASS Left 1/25/2019    Procedure: LEFT FEMORAL POPLITEAL POLYTETRAFLUOROETHYLENE ePTFE(PROPATEN VASCULAR GRAFT) BYPASS;  Surgeon: David Cason M.D.;  Location: [facility];  Service: General   • ANGIOGRAM Left 1/25/2019    Procedure: LEFT LEG ANGIOGRAM;  Surgeon: David Cason M.D.;  Location: SURGERY [facility];  Service: General   • ENDOSCOPY PROCEDURE  3/21/2018    Procedure: ENDOSCOPY PROCEDURE/UPPER;  Surgeon: Enrique Child D.O.;  Location: SURGERY [facility];  Service: Gastroenterology   • COLONOSCOPY - ENDO  8/15/2016    Procedure: COLONOSCOPY - ENDO;  Surgeon: Mane Whatley M.D.;  Location: ENDOSCOPY [facility];  Service:    • GASTROSCOPY WITH BIOPSY  8/13/2016    Procedure: GASTROSCOPY WITH BIOPSY;  Surgeon: Jorge Leavitt M.D.;  Location: ENDOSCOPY [facility];  Service:    • RECOVERY  12/23/2015    Procedure: VASCULAR CASE-BLANKA-LEFT ARM FISTULOGRAM WITH ANGIOPLASTY;  Surgeon: [facility];  Location: SURGERY PRE-POST PROC UNIT [facility];  Service:    • RECOVERY  3/24/2015    Performed by [facility] at SURGERY PRE-POST PROC UNIT [facility]   • RECOVERY  7/29/2014    Performed by [facility] at SURGERY SAME DAY [facility]   • RECOVERY  3/24/2014    Performed by [facility] at SURGERY [facility]   • RECOVERY  12/17/2013    Performed by [facility] at SURGERY SAME DAY [facility]   • RECOVERY  7/2/2013    Performed by [facility] at SURGERY SAME DAY [facility]   • AV FISTULA THROMBOLYSIS  7/2/2013    Performed by David Cason M.D. at SURGERY [facility]   • RECOVERY  1/29/2013    Performed by [facility] at SURGERY SAME DAY [facility]   • RECOVERY  7/23/2012    Performed by SURGERY, IR-RECOVERY at SURGERY SAME DAY [facility]   • VITRECTOMY POSTERIOR  10/11/2011    Performed by NAHUM GE at SURGERY SAME DAY [facility]   • RECOVERY  8/12/2011    Performed by SURGERY, IR-RECOVERY at SURGERY SAME DAY [facility]   • VITRECTOMY POSTERIOR  1/18/2011    Performed by NAHUM GE at SURGERY SAME DAY [facility]   • SCLERAL BUCKLING  1/18/2011    Performed by [facility] GE at SURGERY SAME DAY [facility]   • AV FISTULOGRAM  9/17/2010    Performed by DAVID CASON at SURGERY [facility] ORS   • ANGIOPLASTY BALLOON  9/17/2010    Performed by DAVID CASON at SURGERY [facility] ORS   • AV FISTULOGRAM  7/23/2010    Performed by DAVID CASON at SURGERY [facility] ORS   • ANGIOPLASTY BALLOON  7/23/2010    Performed by DAVID CASON at SURGERY [facility] ORS   • AV FISTULA REVISION  2/19/2010    Performed by WILLIE HATCH at SURGERY [facility] ORS   • AV FISTULA CREATION  2/12/2010    Performed by DAVID CASON at SURGERY [facility]   • CATARACT PHACO  WITH IOL  4/8/08    Performed by STACEY PHILLIPS at SURGERY SAME DAY [facility] ORS   • OTHER ORTHOPEDIC SURGERY  1998    right toe for facititis       Current Outpatient Medications:  No current facility-administered medications on file prior to encounter.      Current Outpatient Medications on File Prior to Encounter   Medication Sig Dispense Refill   • midodrine (PROAMATINE) 10 MG tablet Take 1 Tab by mouth 3 times a day, with meals. (Patient taking differently: Take 10 mg by mouth 3 times a day, with meals. Pt just started this medication on 12/11/2020) 90 Tab 3   • traZODone (DESYREL) 150 MG Tab Take 1 Tab by mouth See Admin Instructions. Pt takes 150MG @ 2130 and another 150MG @ 0130 30 Tab 2   • torsemide (DEMADEX) 10 MG tablet Take 3 Tabs by mouth every day. (Patient taking differently: Take 30 mg by mouth every morning. 3 tablets = 30 mg) 30 Tab 2   • tamsulosin (FLOMAX) 0.4 MG capsule Take 2 Caps by mouth every evening. 90 Cap 2   • rosuvastatin (CRESTOR) 40 MG tablet Take 1 Tab by mouth every evening. 90 Tab 3   • ROPINIRole (REQUIP) 0.5 MG Tab Take 2 Tabs by mouth every bedtime. 90 Tab 1   • montelukast (SINGULAIR) 10 MG Tab Take 1 Tab by mouth every day. (Patient taking differently: Take 10 mg by mouth every evening.) 90 Tab 3   • fluticasone (FLONASE) 50 MCG/ACT nasal spray SPRAY ONE TO TWO SPRAYS IN EACH NOSTRIL ONCE DAILY *FLONASE* (Patient taking differently: Administer 1-2 Sprays into affected nostril(S) every day. SPRAY ONE TO TWO SPRAYS IN EACH NOSTRIL ONCE DAILY *FLONASE*) 9.9 mL 0   • albuterol 108 (90 Base) MCG/ACT Aero Soln inhalation aerosol Inhale 2 Puffs by mouth every four hours as needed for Shortness of Breath. 1 Each 0   • omeprazole (PRILOSEC) 40 MG delayed-release capsule Take 1 Cap by mouth every day. 90 Cap 3   • B Complex-C-Folic Acid (DIALYVITE PO) Take 1 Tab by mouth every evening.     • calcium acetate (PHOS-LO) 667 MG Cap Take 1,334 mg by mouth see administration instructions.  Pt reports that he takes 2 cap for each snack and 2 cap 3 times a day with meals         Current Inpatient Medications:   Current Facility-Administered Medications   Medication Last Admin   • lactated ringers infusion (BOLUS) Stopped at 20 0933   • clotrimazole (LOTRIMIN) 1 % cream     • cefTRIAXone (ROCEPHIN) 2 g in  mL IVPB 2 g at 20 1202   • metroNIDAZOLE (FLAGYL) IVPB 500 mg     • norepinephrine (Levophed) infusion 8 mg/250 mL (premix)      And   • vasopressin (VASOSTRICT) 20 Units in  mL Infusion     • hydrocortisone sodium succinate PF (SOLU-CORTEF) 100 MG injection 100 mg     • heparin injection 5,000 Units       Current Outpatient Medications   Medication   • midodrine (PROAMATINE) 10 MG tablet   • traZODone (DESYREL) 150 MG Tab   • torsemide (DEMADEX) 10 MG tablet   • tamsulosin (FLOMAX) 0.4 MG capsule   • rosuvastatin (CRESTOR) 40 MG tablet   • ROPINIRole (REQUIP) 0.5 MG Tab   • montelukast (SINGULAIR) 10 MG Tab   • fluticasone (FLONASE) 50 MCG/ACT nasal spray   • albuterol 108 (90 Base) MCG/ACT Aero Soln inhalation aerosol   • omeprazole (PRILOSEC) 40 MG delayed-release capsule   • B Complex-C-Folic Acid (DIALYVITE PO)   • calcium acetate (PHOS-LO) 667 MG Cap       Medication Allergy/Sensitivities:  No Known Allergies    Family History:  Family History   Problem Relation Age of Onset   • Stroke Mother    • Hypertension Mother    • Lung Disease Father         Emphysema, resp failure   • Hypertension Brother    • Heart Disease Brother    • Hypertension Son    • No Known Problems Son        Social History:  PCP: Maryse Hartman M.D.  Social History     Tobacco Use   Smoking Status Former Smoker   • Packs/day: 1.00   • Years: 40.00   • Pack years: 40.00   • Types: Cigarettes   • Quit date: 2009   • Years since quittin.9   Smokeless Tobacco Never Used     Social History     Substance and Sexual Activity   Alcohol Use No   • Alcohol/week: 0.0 oz     Social History     Substance  and Sexual Activity   Drug Use No       PHYSICAL EXAM:  Weight/BMI: Body mass index is 24.91 kg/m².  Vitals:    12/14/20 1053 12/14/20 1107 12/14/20 1116 12/14/20 1122   BP: 102/54 (!) 99/56 (!) 99/56 125/71   Pulse: 65 (!) 58 70 63   Resp: 16 17 (!) 22 (!) 42   Temp:       TempSrc:       SpO2:   99% 91%   Weight:       Height:         Oxygen Therapy:  Pulse Oximetry: 91 %, O2 Delivery Device: None - Room Air    Constitutional: Well developed, Well nourished, No acute distress, Non-toxic appearance.    HENMT: Normocephalic, Atraumatic, Bilateral external ears normal, Oropharynx moist mucous membranes, No oral exudates, Nose normal.  No thyromegaly.   Eyes: PERRLA, EOMI, Conjunctiva normal, No discharge.   Neck: Normal range of motion, No cervical tenderness, Supple, No stridor, no JVD.  Cardiovascular: Normal heart rate, Normal rhythm, No murmurs, No rubs, No gallops.   Extremites with intact distal pulses, no cyanosis, clubbing or edema.   Lungs:  Respiratory effort is normal. Normal breath sounds, breath sounds clear to auscultation bilaterally,  no rales, no rhonchi, no wheezing.   Abdomen: Bowel sounds normal, Soft, No tenderness, moderate distention, dull to percussion, no guarding, No rebound, No masses, No hepatosplenomegaly.  Skin: Warm, Dry, No erythema, No rash, no induration or crepitus.  Penis not examined.      Neurologic: Alert & oriented x 3, Normal motor function, Normal sensory function, No focal deficits noted, cranial nerves II through XII are normal.  Psychiatric: Affect normal, Judgment normal, Mood normal.     LAB DATA REVIEWED:  Recent Results (from the past 24 hour(s))   CBC w/ Differential    Collection Time: 12/14/20  6:11 AM   Result Value Ref Range    WBC 11.1 (H) 4.8 - 10.8 K/uL    RBC 2.52 (L) 4.70 - 6.10 M/uL    Hemoglobin 8.1 (L) 14.0 - 18.0 g/dL    Hematocrit 28.4 (L) 42.0 - 52.0 %    .7 (H) 81.4 - 97.8 fL    MCH 32.1 27.0 - 33.0 pg    MCHC 28.5 (L) 33.7 - 35.3 g/dL    RDW  79.4 (H) 35.9 - 50.0 fL    Platelet Count 211 164 - 446 K/uL    MPV 10.9 9.0 - 12.9 fL    Neutrophils-Polys 87.90 (H) 44.00 - 72.00 %    Lymphocytes 3.90 (L) 22.00 - 41.00 %    Monocytes 7.30 0.00 - 13.40 %    Eosinophils 0.10 0.00 - 6.90 %    Basophils 0.20 0.00 - 1.80 %    Immature Granulocytes 0.60 0.00 - 0.90 %    Nucleated RBC 0.30 /100 WBC    Neutrophils (Absolute) 9.73 (H) 1.82 - 7.42 K/uL    Lymphs (Absolute) 0.43 (L) 1.00 - 4.80 K/uL    Monos (Absolute) 0.81 0.00 - 0.85 K/uL    Eos (Absolute) 0.01 0.00 - 0.51 K/uL    Baso (Absolute) 0.02 0.00 - 0.12 K/uL    Immature Granulocytes (abs) 0.07 0.00 - 0.11 K/uL    NRBC (Absolute) 0.03 K/uL   Complete Metabolic Panel (CMP)    Collection Time: 12/14/20  6:11 AM   Result Value Ref Range    Sodium 135 135 - 145 mmol/L    Potassium 6.6 (HH) 3.6 - 5.5 mmol/L    Chloride 89 (L) 96 - 112 mmol/L    Co2 14 (L) 20 - 33 mmol/L    Anion Gap 32.0 (H) 7.0 - 16.0    Glucose 79 65 - 99 mg/dL    Bun 69 (H) 8 - 22 mg/dL    Creatinine 9.11 (HH) 0.50 - 1.40 mg/dL    Calcium 10.5 (H) 8.4 - 10.2 mg/dL    AST(SGOT) 84 (H) 12 - 45 U/L    ALT(SGPT) 71 (H) 2 - 50 U/L    Alkaline Phosphatase 122 (H) 30 - 99 U/L    Total Bilirubin 1.0 0.1 - 1.5 mg/dL    Albumin 4.0 3.2 - 4.9 g/dL    Total Protein 7.1 6.0 - 8.2 g/dL    Globulin 3.1 1.9 - 3.5 g/dL    A-G Ratio 1.3 g/dL   LACTIC ACID    Collection Time: 12/14/20  6:11 AM   Result Value Ref Range    Lactic Acid 9.7 (HH) 0.5 - 2.0 mmol/L   ESTIMATED GFR    Collection Time: 12/14/20  6:11 AM   Result Value Ref Range    GFR If  7 (A) >60 mL/min/1.73 m 2    GFR If Non African American 6 (A) >60 mL/min/1.73 m 2   LACTIC ACID    Collection Time: 12/14/20  8:20 AM   Result Value Ref Range    Lactic Acid 8.1 (HH) 0.5 - 2.0 mmol/L   LIPASE    Collection Time: 12/14/20  8:20 AM   Result Value Ref Range    Lipase 63 (H) 7 - 58 U/L   EKG    Collection Time: 12/14/20  8:32 AM   Result Value Ref Range    [facility]  [facility] Emergency Dept.    Test Date:  2020  Pt Name:    EDVIN GREEN       Department: [facility]  MRN:        [id number]                      Room:       -ROOM 8  Gender:     Male                         Technician: [id number]  :        [id number]                   Requested By:CATINA VALLADARES  Order #:    [id number]                    Reading MD: CATINA VALLADARES DO    Measurements  Intervals                                Axis  Rate:       78                           P:          50  ME:         176                          QRS:        -57  QRSD:       138                          T:          137  QT:         404  QTc:        461    Interpretive Statements  SINUS RHYTHM  RIGHT BUNDLE BRANCH BLOCK  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 INFARCT, OLD  Compared to ECG 2020 14:41:00  Myocardial infarct finding now present  Atrial premature complex(es) no longer present  ST (T wave) deviation no longer present  Electronically Signed On 2020 8:37:10 PST by JOSE VALLADARES DO     COVID/SARS CoV-2 PCR    Collection Time: 20  8:40 AM    Specimen: Nasopharyngeal; Respirate   Result Value Ref Range    COVID Order Status Received    CoV-2, Flu A/B, And RSV by PCR    Collection Time: 20  8:40 AM   Result Value Ref Range    Influenza virus A RNA Negative Negative    Influenza virus B, PCR Negative Negative    RSV, PCR Negative Negative    SARS-CoV-2 by PCR NotDetected     SARS-CoV-2 Source NP Swab        IMAGING:   Images Independently Reviewed   CT-ABDOMEN-PELVIS WITH   Final Result      1.  Normal CT appearance of the perineum with no evidence of herniated gangrene      2.  New small perihepatic, pericholecystic, right anterior pararenal space and pelvic ascites with new moderate gallbladder wall thickening and adjacent fat stranding. This is worrisome for acute cholecystitis although hepatocellular disease can also    cause gallbladder wall thickening      3.  Mildly lobular hepatic contour is  indicates cirrhosis, unchanged and there are some hepatic hypodense masses most consistent with cysts      4.  Left adrenal gland thickening is stable and could be from hyperplasia or more likely small masses      5.  Aortic valve calcification compatible with aortic stenosis and there is multichamber cardiac enlargement      6.  Right pleural fluid small estimated volume has decreased from comparison but there is new pleural thickening which could indicate empyema formation or disorganization      7.  Moderate colonic diverticulosis without evidence of diverticulitis      8.  Right lower lobe chronic pulmonary consolidation could be from round atelectasis favored over consolidation from aspiration, pneumonia or mass      Findings were discussed with CATINA VALLADARES on 12/14/2020 9:54 AM.      DX-CHEST-FOR LINE PLACEMENT Perform procedure in: Other(comment f6 below):   Final Result      Left IJ line tip overlies the brachiocephalic confluence and no pneumothorax is identified      Stable small right pleural effusion and hazy basilar opacity could be from atelectasis or consolidation      Stable moderate cardiac silhouette enlargement      DX-CHEST-PORTABLE (1 VIEW)   Final Result         1.  Hazy right pulmonary atelectasis versus infiltrates, stable.   2.  Small right pleural effusion, stable   3.  Cardiomegaly   4.  Atherosclerosis      CT-DRAIN-CHOLECTYSTOSTOMY    (Results Pending)       ASSESSMENT/PLAN     80-year-old male with significant medical comorbidities presenting with hypotension, elevated lactic acid, with CT findings demonstrating thickened gallbladder wall as well as right upper quadrant fluid and cirrhosis.  It is unclear at this time if the gallbladder wall thickening is related to his cirrhosis or primary cholecystitis.  The patient is completely asymptomatic.    I do not feel that the patient is a surgical candidate due to his significant medical comorbidities and his cirrhosis.  There is  concern that his gallbladder could be the cause of his current sepsis.  An emergent percutaneous cholecystostomy has been arranged.  The patient is currently being treated with IV ceftriaxone and Flagyl and vancomycin.  He will be admitted to the ICU for supportive management.    Ashlee Martines MD  General Surgery  Colon and Rectal Surgery  [facility]

## 2020-12-14 NOTE — CONSULTS
"[facility] -  CONSULTATION NOTE               Author: Mary Jo Beatty M.D. Date & Time: 12/14/2020  3:09 PM       REASON FOR CONSULTATION:   Inpatient hemodialysis management.    CHIEF COMPLAINT:   Groin pain     HISTORY OF PRESENT ILLNESS:    81 yo M PMH ESRD MWF iHD, HTN, anemia of CKD, and CKD-MBD who presents to ED with CC as above.He normally dialyzes at [facility]. Nephrology was asked to consult for ESRD, HD management and maintenance respiectively.   Patient described pain since Friday, Sharp, burning, constant pain.  Unable to sleep this weekend.  No abdominal pain or back pain.  No fever chills.  No nausea or vomiting.  No history for similar symptoms.  In ED patient was found to have 100 ml in bladder by bladder scan, lactate resulted as 9.7 therefore patient was started on broad- spectrum AB. Thereafter patient dropped BP, received IV fluids w/o improvement, started on levophed. CT abdomen showed Acute cholecystitis, surgery was consulted for urgent percutaneous cholecystotomy. COVID test (-).     REVIEW OF SYSTEMS:    No performed . Patient is off the floor for Ct scan       PAST MEDICAL HISTORY:   Past Medical History:   Diagnosis Date   • Anesthesia     \"needs more sedation\" (can sometimes hear MD during procedure)    • Anticoagulation monitoring, special range    • Aortic stenosis     mod AS- concern for low flow severe AS with rEFof 30%   • Arterial leg ulcer (HCC) 11/8/2018   • Atrial flutter ([facility]) 6/12/2019   • Basal cell carcinoma of left cheek 3/26/2015   • BPH 7/14/2009   • Bronchitis 12/25/2018   • CAD (coronary artery disease) 2/20/2014   • CATARACT     sanjuanita surgery complete   • CHF (congestive heart failure), NYHA class II, chronic, systolic ([facility]) E F30 in setting of atrial flutter 6/13/2019   • Chronic respiratory failure with hypoxia ([facility]) 5/8/2016   • CKD (chronic kidney disease) stage 4, GFR 15-29 ml/min ([facility]) 1/15/2010    end stage renal disease   • " COPD (chronic obstructive pulmonary disease) ([facility])     wears 2.5 L o2 sometimes when he sleeps   • Detached retina    • Dialysis     m,w,f [facility]   • EMPHYSEMA    • Glaucoma     Early stage   • Gout    • Heart burn     occas   • Heart murmur     maria esther lee cardiologist   • Hypertension    • Indigestion     occas   • Kidney cyst    • Leg pain, bilateral 8/10/2015   • On supplemental oxygen therapy    • Peripheral vascular disease ([facility]) 8/10/2015   • Pneumonia    • Primary insomnia 3/22/2018   • Proteinuria    • PVD (peripheral vascular disease) ([facility])    • Sleep apnea     O2 PER CANULA  AT NIGHT 2l/m         PAST SURGICAL HISTORY:   Past Surgical History:   Procedure Laterality Date   • GASTROSCOPY-ENDO  11/17/2020    Procedure: GASTROSCOPY;  Surgeon: Juan Matthews M.D.;  Location: SURGERY [facility];  Service: Gastroenterology   • PB COLONOSCOPY,FLEX,W/CONTROL, BLEEDING N/A 2/24/2020    Procedure: COLONOSCOPY, WITH ARGON PLASMA COAGULATION;  Surgeon: Juan Matthews M.D.;  Location: SURGERY SAME DAY [facility];  Service: Gastroenterology   • GASTROSCOPY W/PUSH ENTERSCOPY N/A 2/24/2020    Procedure: GASTROSCOPY, WITH PUSH ENTEROSCOPY;  Surgeon: Juan Matthews M.D.;  Location: SURGERY SAME DAY [facility];  Service: Gastroenterology   • PB COLONOSCOPY,DIAGNOSTIC N/A 2/23/2020    Procedure: COLONOSCOPY;  Surgeon: Enrique Child D.O.;  Location: SURGERY [facility];  Service: Gastroenterology   • GASTROSCOPY N/A 2/23/2020    Procedure: GASTROSCOPY;  Surgeon: Enrique Child D.O.;  Location: SURGERY [facility];  Service: Gastroenterology   • CAPSULE ENDOSCOPY ADMINISTRATION N/A 2/20/2020    Procedure: ADMINISTRATION, CAPSULE, FOR CAPSULE ENDOSCOPY;  Surgeon: Gucci Westbrook M.D.;  Location: ENDOSCOPY [facility];  Service: Gastroenterology   • CAPSULE ENDOSCOPY RETRIEVAL  2/20/2020    Procedure: CAPSULE ENDOSCOPY RETRIEVAL;  Surgeon: Gucci Westbrook M.D.;  Location: ENDOSCOPY  [facility];  Service: Gastroenterology   • PB SMALL BOWEL ENDOSCOPY,PAST 2ND DUOD  2/19/2020        • COLONOSCOPY - ENDO  2/19/2020        • PB COLONOSCOPY,DIAGNOSTIC  2/19/2020    Procedure: COLONOSCOPY;  Surgeon: Gucci Westbrook M.D.;  Location: [facility];  Service: Gastroenterology   • GASTROSCOPY  2/19/2020    Procedure: GASTROSCOPY;  Surgeon: Gucci Westbrook M.D.;  Location: [facility];  Service: Gastroenterology   • GASTROSCOPY-ENDO  2/7/2020    Procedure: GASTROSCOPY;  Surgeon: Jong Carrasquillo M.D.;  Location: ENDOSCOPY [facility];  Service: Gastroenterology   • STENT PLACEMENT  01/26/2020    lda   • AV FISTULA CREATION Right 8/6/2019    Procedure: CREATION, AV FISTULA -  RIGHT ARM  ,  and Ligation of Left Arm Fistula;  Surgeon: Sera Peters M.D.;  Location: [facility];  Service: General   • CATH PLACEMENT Right 8/6/2019    Procedure: INSERTION, CATHETER - PERMA ,;  Surgeon: Sera Peters M.D.;  Location: [facility];  Service: General   • JUSITN  6/13/2019    Procedure: ECHOCARDIOGRAM, TRANSESOPHAGEAL;  Surgeon: Harvinder Hoang M.D.;  Location: [facility];  Service: Cardiac   • CARDIOVERSION  6/13/2019    Procedure: CARDIOVERSION;  Surgeon: Harvinder Hoang M.D.;  Location: [facility];  Service: Cardiac   • AV FISTULA CREATION Right 2/21/2019    Procedure: AV FISTULA CREATION - ARM;  Surgeon: David Cason M.D.;  Location: [facility];  Service: General   • FEMORAL ENDARTERECTOMY Left 1/25/2019    Procedure: FEMORAL ENDARTERECTOMY;  Surgeon: David Cason M.D.;  Location: [facility];  Service: General   • FEMORAL POPLITEAL BYPASS Left 1/25/2019    Procedure: LEFT FEMORAL POPLITEAL POLYTETRAFLUOROETHYLENE ePTFE(PROPATEN VASCULAR GRAFT) BYPASS;  Surgeon: David Cason M.D.;  Location: [facility];  Service: General   • ANGIOGRAM Left  1/25/2019    Procedure: LEFT LEG ANGIOGRAM;  Surgeon: David Cason M.D.;  Location: SURGERY [facility];  Service: General   • ENDOSCOPY PROCEDURE  3/21/2018    Procedure: ENDOSCOPY PROCEDURE/UPPER;  Surgeon: Enrique Child D.O.;  Location: SURGERY [facility];  Service: Gastroenterology   • COLONOSCOPY - ENDO  8/15/2016    Procedure: COLONOSCOPY - ENDO;  Surgeon: Mane Whatley M.D.;  Location: ENDOSCOPY [facility];  Service:    • GASTROSCOPY WITH BIOPSY  8/13/2016    Procedure: GASTROSCOPY WITH BIOPSY;  Surgeon: Jorge Leavitt M.D.;  Location: ENDOSCOPY [facility];  Service:    • RECOVERY  12/23/2015    Procedure: VASCULAR CASE-CASON-LEFT ARM FISTULOGRAM WITH ANGIOPLASTY;  Surgeon: Recoveryonly Surgery;  [place] [facility];  Service:    • RECOVERY  3/24/2015    Performed by Recoveryonly Surgery at SURGERY [facility]   • RECOVERY  7/29/2014    Performed by Ir-Recovery Surgery at SURGERY SAME DAY [facility]   • RECOVERY  3/24/2014    Performed by Ir-Recovery Surgery at SURGERY [facility]   • RECOVERY  12/17/2013    Performed by Ir-Recovery Surgery at SURGERY SAME DAY [facility]   • RECOVERY  7/2/2013    Performed by Ir-Recovery Surgery at SURGERY SAME DAY [facility]   • AV FISTULA THROMBOLYSIS  7/2/2013    Performed by David Cason M.D. at SURGERY [facility]   • RECOVERY  1/29/2013    Performed by Ir-Recovery Surgery at SURGERY SAME DAY [facility]   • RECOVERY  7/23/2012    Performed by SURGERY, IR-RECOVERY at SURGERY SAME DAY [facility]   • VITRECTOMY POSTERIOR  10/11/2011    Performed by NAHUM GE at SURGERY SAME DAY [facility]S   • RECOVERY  8/12/2011    Performed by SURGERY, IR-RECOVERY at SURGERY SAME DAY [facility]   • VITRECTOMY POSTERIOR  1/18/2011    Performed by NAHUM GE at SURGERY SAME DAY [facility]S   • SCLERAL BUCKLING  1/18/2011    Performed by NAHUM GE at SURGERY SAME DAY [facility] ORS  "  • AV FISTULOGRAM  9/17/2010    Performed by ALESHIA MOSCOSO at SURGERY [facility]   • ANGIOPLASTY BALLOON  9/17/2010    Performed by ALESHIA MOSCOSO at SURGERY [facility]   • AV FISTULOGRAM  7/23/2010    Performed by ALESHIA MOSCOSO at SURGERY [facility]   • ANGIOPLASTY BALLOON  7/23/2010    Performed by ALESHIA MOSCOSO at SURGERY [facility]S   • AV FISTULA REVISION  2/19/2010    Performed by WILLIE HATCH at SURGERY [facility]S   • AV FISTULA CREATION  2/12/2010    Performed by ALESHIA MOSCOSO at SURGERY [facility]S   • CATARACT PHACO WITH IOL  4/8/08    Performed by STACEY PHILLIPS at SURGERY SAME DAY [facility]S   • OTHER ORTHOPEDIC SURGERY  1998    right toe for facititis       FAMILY HISTORY:   No family history of kidney disease    SOCIAL HISTORY:   No smoking, no etoh, no illicit drugs.    HOME MEDICATIONS:   Reviewed and documented in chart    ALLERGIES:  Patient has no known allergies.    PHYSICAL EXAM:  VS:  /58   Pulse 71   Temp 36.6 °C (97.9 °F) (Temporal)   Resp 18   Ht 1.702 m (5' 7\")   Wt 75.4 kg (166 lb 3.6 oz)   SpO2 98%   BMI 26.03 kg/m²   No performed . Patient is off the floor for Ct scan       LABS:  Recent Results (from the past 24 hour(s))   CBC w/ Differential    Collection Time: 12/14/20  6:11 AM   Result Value Ref Range    WBC 11.1 (H) 4.8 - 10.8 K/uL    RBC 2.52 (L) 4.70 - 6.10 M/uL    Hemoglobin 8.1 (L) 14.0 - 18.0 g/dL    Hematocrit 28.4 (L) 42.0 - 52.0 %    .7 (H) 81.4 - 97.8 fL    MCH 32.1 27.0 - 33.0 pg    MCHC 28.5 (L) 33.7 - 35.3 g/dL    RDW 79.4 (H) 35.9 - 50.0 fL    Platelet Count 211 164 - 446 K/uL    MPV 10.9 9.0 - 12.9 fL    Neutrophils-Polys 87.90 (H) 44.00 - 72.00 %    Lymphocytes 3.90 (L) 22.00 - 41.00 %    Monocytes 7.30 0.00 - 13.40 %    Eosinophils 0.10 0.00 - 6.90 %    Basophils 0.20 0.00 - 1.80 %    Immature Granulocytes 0.60 0.00 - 0.90 %    Nucleated RBC 0.30 /100 WBC    Neutrophils (Absolute) " 9.73 (H) 1.82 - 7.42 K/uL    Lymphs (Absolute) 0.43 (L) 1.00 - 4.80 K/uL    Monos (Absolute) 0.81 0.00 - 0.85 K/uL    Eos (Absolute) 0.01 0.00 - 0.51 K/uL    Baso (Absolute) 0.02 0.00 - 0.12 K/uL    Immature Granulocytes (abs) 0.07 0.00 - 0.11 K/uL    NRBC (Absolute) 0.03 K/uL   Complete Metabolic Panel (CMP)    Collection Time: 20  6:11 AM   Result Value Ref Range    Sodium 135 135 - 145 mmol/L    Potassium 6.6 (HH) 3.6 - 5.5 mmol/L    Chloride 89 (L) 96 - 112 mmol/L    Co2 14 (L) 20 - 33 mmol/L    Anion Gap 32.0 (H) 7.0 - 16.0    Glucose 79 65 - 99 mg/dL    Bun 69 (H) 8 - 22 mg/dL    Creatinine 9.11 (HH) 0.50 - 1.40 mg/dL    Calcium 10.5 (H) 8.4 - 10.2 mg/dL    AST(SGOT) 84 (H) 12 - 45 U/L    ALT(SGPT) 71 (H) 2 - 50 U/L    Alkaline Phosphatase 122 (H) 30 - 99 U/L    Total Bilirubin 1.0 0.1 - 1.5 mg/dL    Albumin 4.0 3.2 - 4.9 g/dL    Total Protein 7.1 6.0 - 8.2 g/dL    Globulin 3.1 1.9 - 3.5 g/dL    A-G Ratio 1.3 g/dL   LACTIC ACID    Collection Time: 20  6:11 AM   Result Value Ref Range    Lactic Acid 9.7 (HH) 0.5 - 2.0 mmol/L   ESTIMATED GFR    Collection Time: 20  6:11 AM   Result Value Ref Range    GFR If  7 (A) >60 mL/min/1.73 m 2    GFR If Non African American 6 (A) >60 mL/min/1.73 m 2   LACTIC ACID    Collection Time: 20  8:20 AM   Result Value Ref Range    Lactic Acid 8.1 (HH) 0.5 - 2.0 mmol/L   LIPASE    Collection Time: 20  8:20 AM   Result Value Ref Range    Lipase 63 (H) 7 - 58 U/L   EKG    Collection Time: 20  8:32 AM   Result Value Ref Range    Report       [facility] Emergency Dept[facility]    Test Date:  2020  Pt Name:    EDVIN GREEN       Department: [facility]  MRN:        [id number]                      Room:       [place] [address]  Gender:     Male                         Technician: [id number]  :        [id number]                   Requested By:CATINA VALLADARES  Order #:    [id number]                    Hilaria MD: CATINA LARSON  DO BLAINE    Measurements  Intervals                                Axis  Rate:       78                           P:          50  ND:         176                          QRS:        -57  QRSD:       138                          T:          137  QT:         404  QTc:        461    Interpretive Statements  SINUS RHYTHM  RIGHT BUNDLE BRANCH BLOCK  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 INFARCT, OLD  Compared to ECG 12/07/2020 14:41:00  Myocardial infarct finding now present  Atrial premature complex(es) no longer present  ST (T wave) deviation no longer present  Electronically Signed On 12- 8:37:10 PST by JOSE VALLADARES DO     COVID/SARS CoV-2 PCR    Collection Time: 12/14/20  8:40 AM    Specimen: Nasopharyngeal; Respirate   Result Value Ref Range    COVID Order Status Received    CoV-2, Flu A/B, And RSV by PCR    Collection Time: 12/14/20  8:40 AM   Result Value Ref Range    Influenza virus A RNA Negative Negative    Influenza virus B, PCR Negative Negative    RSV, PCR Negative Negative    SARS-CoV-2 by PCR NotDetected     SARS-CoV-2 Source NP Swab    PT/INR    Collection Time: 12/14/20 12:05 PM   Result Value Ref Range    PT 18.2 (H) 12.0 - 14.6 sec    INR 1.55 (H) 0.87 - 1.13   PTT    Collection Time: 12/14/20 12:05 PM   Result Value Ref Range    APTT 34.9 24.7 - 36.0 sec   Lactic Acid Every four hours after STAT order    Collection Time: 12/14/20  2:30 PM   Result Value Ref Range    Lactic Acid 3.8 (H) 0.5 - 2.0 mmol/L       (click the triangle to expand results)    IMAGING:  CT-ABDOMEN-PELVIS WITH   Final Result      1.  Normal CT appearance of the perineum with no evidence of herniated gangrene      2.  New small perihepatic, pericholecystic, right anterior pararenal space and pelvic ascites with new moderate gallbladder wall thickening and adjacent fat stranding. This is worrisome for acute cholecystitis although hepatocellular disease can also    cause gallbladder wall thickening      3.  Mildly lobular hepatic contour  is indicates cirrhosis, unchanged and there are some hepatic hypodense masses most consistent with cysts      4.  Left adrenal gland thickening is stable and could be from hyperplasia or more likely small masses      5.  Aortic valve calcification compatible with aortic stenosis and there is multichamber cardiac enlargement      6.  Right pleural fluid small estimated volume has decreased from comparison but there is new pleural thickening which could indicate empyema formation or disorganization      7.  Moderate colonic diverticulosis without evidence of diverticulitis      8.  Right lower lobe chronic pulmonary consolidation could be from round atelectasis favored over consolidation from aspiration, pneumonia or mass      Findings were discussed with CATINA VALLADARES on 12/14/2020 9:54 AM.      DX-CHEST-FOR LINE PLACEMENT Perform procedure in: Other(comment f6 below):   Final Result      Left IJ line tip overlies the brachiocephalic confluence and no pneumothorax is identified      Stable small right pleural effusion and hazy basilar opacity could be from atelectasis or consolidation      Stable moderate cardiac silhouette enlargement      DX-CHEST-PORTABLE (1 VIEW)   Final Result         1.  Hazy right pulmonary atelectasis versus infiltrates, stable.   2.  Small right pleural effusion, stable   3.  Cardiomegaly   4.  Atherosclerosis      CT-DRAIN-CHOLECTYSTOSTOMY    (Results Pending)       PMH/FH/SH/meds/labs/images reviewed     ASSESSMENT:  # ESRD    * Etiology likely 2/2 HTN    * MWF iHD at [place] [facility]  # septic shock 2/2 cholecystitis   - deferred to surgery and ICU team   #h/o HTN  - hold BP meds   # Anemia of CKD    * Goal Hgb 10-11  #CKD-MBD    * Managed at HD unit  #Chronic systolic HF  # HLD  # CAD     PLAN:  - HD today for hyperkalemia then c/w MWF iHD during stay  - UF as tolerated  - strict IO  - fluid restriction 1L   - No dietary protein restrictions  - Dose all meds per ESRD  - follow Blood cultures    - antibiotics per primary team   - Transfuse as needed to maintain Hgb > 7    Thank you for this consult, we will continue to follow.    Mary Jo Beatty MD

## 2020-12-14 NOTE — ED NOTES
Assumed patient care. Pt assesement done.  Plan of care reviewed with patient. BP 94/39. IVF infusing. Room air 90%. Per pt, he uses oxygen at home. 2 L NC placed.

## 2020-12-14 NOTE — TELEPHONE ENCOUNTER
Patient has been admitted within the ICU @ [facility] today.  Could you please R/S his visit when convenient. Thank you!

## 2020-12-14 NOTE — DISCHARGE PLANNING
Pt previously on service with [facility]. Will need new order if returning home with [facility].

## 2020-12-14 NOTE — TELEPHONE ENCOUNTER
Left voice message on both of the pt's phones that are in his demographics to let him know of the schedule change for tomorrow, 12/18/2020 rc

## 2020-12-14 NOTE — ED NOTES
Federico from Lab called with critical result of Lactic Acid 8.1 at 0831. Critical lab result read back to Federico.   Dr. Zaragoza notified of critical lab result at 0831.  Critical lab result read back by Dr. Zaragoza.

## 2020-12-14 NOTE — TELEPHONE ENCOUNTER
Pippa, please pass on to Dr. Jonathan Black's nurse.    Received phone call at around 3 AM on 12/14/2020.    Patient telling me that he started Midodrine on Friday and then took it again on Saturday and started to notice discomfort.  He read that midodrine can cause urge to urinate.  He is a dialysis patient and has been oliguric for many years.  He is having escalating discomfort keeping him from sleeping.    I advised that I find it unlikely to be the Midodrine but I was more concerned that he was having significant discomfort where he could not sleep.  I advised to be evaluated in the emergency room for other causes.    ELVIS Black.

## 2020-12-14 NOTE — OR SURGEON
Immediate Post- Operative Note        PostOp Diagnosis: Sepsis    Procedure(s): CT guided Percutaneous Cholecystostomy tube Placement      Estimated Blood Loss: Less than 5 ml        Complications: None            12/14/2020     3:44 PM     Raman Stovall M.D.

## 2020-12-14 NOTE — ED NOTES
Central line insertion TLC to L neck. BP 72/34. Levophed will be switched to TLC after placement confirmation.

## 2020-12-15 LAB
ALBUMIN SERPL BCP-MCNC: 3.6 G/DL (ref 3.2–4.9)
ALBUMIN/GLOB SERPL: 1.3 G/DL
ALP SERPL-CCNC: 113 U/L (ref 30–99)
ALT SERPL-CCNC: 226 U/L (ref 2–50)
ANION GAP SERPL CALC-SCNC: 17 MMOL/L (ref 7–16)
AST SERPL-CCNC: 203 U/L (ref 12–45)
BASOPHILS # BLD AUTO: 0.1 % (ref 0–1.8)
BASOPHILS # BLD: 0.01 K/UL (ref 0–0.12)
BILIRUB SERPL-MCNC: 0.6 MG/DL (ref 0.1–1.5)
BUN SERPL-MCNC: 42 MG/DL (ref 8–22)
CALCIUM SERPL-MCNC: 9.7 MG/DL (ref 8.4–10.2)
CHLORIDE SERPL-SCNC: 97 MMOL/L (ref 96–112)
CO2 SERPL-SCNC: 27 MMOL/L (ref 20–33)
CORTIS SERPL-MCNC: 21.7 UG/DL (ref 0–23)
CREAT SERPL-MCNC: 6.03 MG/DL (ref 0.5–1.4)
EOSINOPHIL # BLD AUTO: 0 K/UL (ref 0–0.51)
EOSINOPHIL NFR BLD: 0 % (ref 0–6.9)
ERYTHROCYTE [DISTWIDTH] IN BLOOD BY AUTOMATED COUNT: 76.9 FL (ref 35.9–50)
GLOBULIN SER CALC-MCNC: 2.8 G/DL (ref 1.9–3.5)
GLUCOSE SERPL-MCNC: 143 MG/DL (ref 65–99)
GRAM STN SPEC: NORMAL
HCT VFR BLD AUTO: 27.2 % (ref 42–52)
HGB BLD-MCNC: 7.8 G/DL (ref 14–18)
IMM GRANULOCYTES # BLD AUTO: 0.05 K/UL (ref 0–0.11)
IMM GRANULOCYTES NFR BLD AUTO: 0.5 % (ref 0–0.9)
LYMPHOCYTES # BLD AUTO: 0.13 K/UL (ref 1–4.8)
LYMPHOCYTES NFR BLD: 1.4 % (ref 22–41)
MCH RBC QN AUTO: 31.5 PG (ref 27–33)
MCHC RBC AUTO-ENTMCNC: 28.7 G/DL (ref 33.7–35.3)
MCV RBC AUTO: 109.7 FL (ref 81.4–97.8)
MONOCYTES # BLD AUTO: 0.26 K/UL (ref 0–0.85)
MONOCYTES NFR BLD AUTO: 2.8 % (ref 0–13.4)
NEUTROPHILS # BLD AUTO: 8.79 K/UL (ref 1.82–7.42)
NEUTROPHILS NFR BLD: 95.2 % (ref 44–72)
NRBC # BLD AUTO: 0 K/UL
NRBC BLD-RTO: 0 /100 WBC
PLATELET # BLD AUTO: 205 K/UL (ref 164–446)
PMV BLD AUTO: 10.7 FL (ref 9–12.9)
POTASSIUM SERPL-SCNC: 4.7 MMOL/L (ref 3.6–5.5)
PROT SERPL-MCNC: 6.4 G/DL (ref 6–8.2)
RBC # BLD AUTO: 2.48 M/UL (ref 4.7–6.1)
SIGNIFICANT IND 70042: NORMAL
SITE SITE: NORMAL
SODIUM SERPL-SCNC: 141 MMOL/L (ref 135–145)
SOURCE SOURCE: NORMAL
WBC # BLD AUTO: 9.2 K/UL (ref 4.8–10.8)

## 2020-12-15 PROCEDURE — 85025 COMPLETE CBC W/AUTO DIFF WBC: CPT

## 2020-12-15 PROCEDURE — A9270 NON-COVERED ITEM OR SERVICE: HCPCS | Performed by: INTERNAL MEDICINE

## 2020-12-15 PROCEDURE — 700105 HCHG RX REV CODE 258: Performed by: INTERNAL MEDICINE

## 2020-12-15 PROCEDURE — 99291 CRITICAL CARE FIRST HOUR: CPT | Performed by: INTERNAL MEDICINE

## 2020-12-15 PROCEDURE — 700101 HCHG RX REV CODE 250: Performed by: INTERNAL MEDICINE

## 2020-12-15 PROCEDURE — 80053 COMPREHEN METABOLIC PANEL: CPT

## 2020-12-15 PROCEDURE — 700111 HCHG RX REV CODE 636 W/ 250 OVERRIDE (IP)

## 2020-12-15 PROCEDURE — 700111 HCHG RX REV CODE 636 W/ 250 OVERRIDE (IP): Performed by: HOSPITALIST

## 2020-12-15 PROCEDURE — 700102 HCHG RX REV CODE 250 W/ 637 OVERRIDE(OP): Performed by: INTERNAL MEDICINE

## 2020-12-15 PROCEDURE — 700111 HCHG RX REV CODE 636 W/ 250 OVERRIDE (IP): Performed by: INTERNAL MEDICINE

## 2020-12-15 PROCEDURE — 770022 HCHG ROOM/CARE - ICU (200)

## 2020-12-15 RX ORDER — METRONIDAZOLE 500 MG/1
500 TABLET ORAL EVERY 8 HOURS
Status: DISCONTINUED | OUTPATIENT
Start: 2020-12-15 | End: 2020-12-17

## 2020-12-15 RX ORDER — NOREPINEPHRINE BITARTRATE 0.03 MG/ML
.5-3 INJECTION, SOLUTION INTRAVENOUS CONTINUOUS
Status: DISCONTINUED | OUTPATIENT
Start: 2020-12-15 | End: 2020-12-18

## 2020-12-15 RX ADMIN — MIDODRINE HYDROCHLORIDE 10 MG: 5 TABLET ORAL at 16:12

## 2020-12-15 RX ADMIN — HYDROCORTISONE SODIUM SUCCINATE 100 MG: 100 INJECTION, POWDER, FOR SOLUTION INTRAMUSCULAR; INTRAVENOUS at 05:32

## 2020-12-15 RX ADMIN — NOREPINEPHRINE BITARTRATE 4 MCG/MIN: 1 INJECTION INTRAVENOUS at 12:26

## 2020-12-15 RX ADMIN — METRONIDAZOLE 500 MG: 500 INJECTION, SOLUTION INTRAVENOUS at 05:31

## 2020-12-15 RX ADMIN — HEPARIN SODIUM 5000 UNITS: 5000 INJECTION, SOLUTION INTRAVENOUS; SUBCUTANEOUS at 05:31

## 2020-12-15 RX ADMIN — HYDROCORTISONE SODIUM SUCCINATE 100 MG: 100 INJECTION, POWDER, FOR SOLUTION INTRAMUSCULAR; INTRAVENOUS at 14:34

## 2020-12-15 RX ADMIN — METRONIDAZOLE 500 MG: 500 TABLET ORAL at 14:35

## 2020-12-15 RX ADMIN — CEFTRIAXONE SODIUM 2 G: 2 INJECTION, POWDER, FOR SOLUTION INTRAMUSCULAR; INTRAVENOUS at 05:32

## 2020-12-15 RX ADMIN — MICONAZOLE NITRATE: 20 CREAM TOPICAL at 10:59

## 2020-12-15 RX ADMIN — MICONAZOLE NITRATE: 20 CREAM TOPICAL at 18:19

## 2020-12-15 RX ADMIN — METRONIDAZOLE 500 MG: 500 TABLET ORAL at 21:14

## 2020-12-15 RX ADMIN — HYDROCORTISONE SODIUM SUCCINATE 100 MG: 100 INJECTION, POWDER, FOR SOLUTION INTRAMUSCULAR; INTRAVENOUS at 21:14

## 2020-12-15 RX ADMIN — HEPARIN SODIUM 5000 UNITS: 5000 INJECTION, SOLUTION INTRAVENOUS; SUBCUTANEOUS at 14:34

## 2020-12-15 RX ADMIN — HEPARIN SODIUM 5000 UNITS: 5000 INJECTION, SOLUTION INTRAVENOUS; SUBCUTANEOUS at 21:14

## 2020-12-15 RX ADMIN — MORPHINE SULFATE 1 MG: 4 INJECTION INTRAVENOUS at 16:18

## 2020-12-15 RX ADMIN — ALTEPLASE 2 MG: 2.2 INJECTION, POWDER, LYOPHILIZED, FOR SOLUTION INTRAVENOUS at 00:32

## 2020-12-15 ASSESSMENT — COGNITIVE AND FUNCTIONAL STATUS - GENERAL
MOBILITY SCORE: 18
TOILETING: A LOT
SUGGESTED CMS G CODE MODIFIER DAILY ACTIVITY: CK
MOVING TO AND FROM BED TO CHAIR: A LITTLE
SUGGESTED CMS G CODE MODIFIER MOBILITY: CK
WALKING IN HOSPITAL ROOM: A LITTLE
STANDING UP FROM CHAIR USING ARMS: A LITTLE
PERSONAL GROOMING: A LITTLE
DRESSING REGULAR LOWER BODY CLOTHING: A LOT
MOVING FROM LYING ON BACK TO SITTING ON SIDE OF FLAT BED: A LITTLE
CLIMB 3 TO 5 STEPS WITH RAILING: A LOT
DAILY ACTIVITIY SCORE: 16
HELP NEEDED FOR BATHING: A LOT
DRESSING REGULAR UPPER BODY CLOTHING: A LITTLE

## 2020-12-15 ASSESSMENT — ENCOUNTER SYMPTOMS
NEUROLOGICAL NEGATIVE: 1
CARDIOVASCULAR NEGATIVE: 1
ABDOMINAL PAIN: 1
GASTROINTESTINAL NEGATIVE: 1
RESPIRATORY NEGATIVE: 1
MUSCULOSKELETAL NEGATIVE: 1
EYES NEGATIVE: 1
PSYCHIATRIC NEGATIVE: 1

## 2020-12-15 ASSESSMENT — PAIN DESCRIPTION - PAIN TYPE
TYPE: ACUTE PAIN

## 2020-12-15 ASSESSMENT — FIBROSIS 4 INDEX: FIB4 SCORE: 3.69

## 2020-12-15 NOTE — PROGRESS NOTES
[facility] Nephrology Daily Progress Note    Date of Service  12/15/2020     Author: Aurora MARRERO, CNN-NP  Collaborating Physician: Dr. Mary Jo Beatty     Chief Complaint  81 yo M PMH ESRD MWF iHD, HTN, anemia of CKD, and CKD-MBD who presents to ED with CC as above.He normally dialyzes at [facility]. Nephrology was asked to consult for ESRD, HD management and maintenance respiectively.   Patient described pain since Friday, Sharp, burning, constant pain.  Unable to sleep this weekend.  No abdominal pain or back pain.  No fever chills.  No nausea or vomiting.  No history for similar symptoms.  In ED patient was found to have 100 ml in bladder by bladder scan, lactate resulted as 9.7 therefore patient was started on broad- spectrum AB. Thereafter patient dropped BP, received IV fluids w/o improvement, started on levophed. CT abdomen showed Acute cholecystitis, surgery was consulted for urgent percutaneous cholecystotomy. COVID test (-).        Daily Nephrology Summary:  12/14: consult done, HD performed  12/15: in ICU on pressors, sitting up in bed, feeling okay, tolerated HD yesterday with UF: 2.0L, hesitant about restarting Midodrine as he feels this is what caused his problem with gall bladder     Review of Systems  Review of Systems   Constitutional: Positive for malaise/fatigue.   HENT: Negative.    Eyes: Negative.    Respiratory: Negative.    Cardiovascular: Negative.    Gastrointestinal: Positive for abdominal pain.   Genitourinary: Negative.    Musculoskeletal: Negative.    Skin: Negative.    Neurological: Negative.    Endo/Heme/Allergies: Negative.    Psychiatric/Behavioral: Negative.         Physical Exam  Temp:  [35.9 °C (96.7 °F)-36.6 °C (97.8 °F)] 36.5 °C (97.7 °F)  Pulse:  [26-94] 83  Resp:  [14-52] 24  BP: ()/(34-71) 95/56  SpO2:  [87 %-100 %] 98 %    Physical Exam  Constitutional:       Appearance: Normal appearance. He is normal weight.   HENT:      Head: Normocephalic and  atraumatic.      Nose: Nose normal.      Mouth/Throat:      Mouth: Mucous membranes are dry.      Pharynx: Oropharynx is clear.   Eyes:      Extraocular Movements: Extraocular movements intact.      Conjunctiva/sclera: Conjunctivae normal.      Pupils: Pupils are equal, round, and reactive to light.   Neck:      Musculoskeletal: Normal range of motion and neck supple.   Cardiovascular:      Rate and Rhythm: Normal rate and regular rhythm.   Pulmonary:      Effort: Pulmonary effort is normal.      Breath sounds: Normal breath sounds.      Comments: BS diminished in bases   Abdominal:      General: Abdomen is flat. Bowel sounds are normal.      Tenderness: There is abdominal tenderness.   Musculoskeletal: Normal range of motion.   Skin:     General: Skin is warm and dry.   Neurological:      General: No focal deficit present.      Mental Status: He is alert and oriented to person, place, and time. Mental status is at baseline.   Psychiatric:         Mood and Affect: Mood normal.         Behavior: Behavior normal.         Thought Content: Thought content normal.         Judgment: Judgment normal.     L AVF + B&T present     Fluids    Intake/Output Summary (Last 24 hours) at 12/15/2020 0836  Last data filed at 12/15/2020 0400  Gross per 24 hour   Intake 914.13 ml   Output 2630 ml   Net -1715.87 ml       Laboratory  Recent Labs     12/14/20  0611 12/15/20  0500   WBC 11.1* 9.2   RBC 2.52* 2.48*   HEMOGLOBIN 8.1* 7.8*   HEMATOCRIT 28.4* 27.2*   .7* 109.7*   MCH 32.1 31.5   MCHC 28.5* 28.7*   RDW 79.4* 76.9*   PLATELETCT 211 205   MPV 10.9 10.7     Recent Labs     12/14/20  0611 12/15/20  0500   SODIUM 135 141   POTASSIUM 6.6* 4.7   CHLORIDE 89* 97   CO2 14* 27   GLUCOSE 79 143*   BUN 69* 42*   CREATININE 9.11* 6.03*   CALCIUM 10.5* 9.7     Recent Labs     12/14/20  1205   APTT 34.9   INR 1.55*     No results for input(s): NTPROBNP in the last 72 hours.        Imaging    Assessment/Plan  ASSESSMENT:  # ESRD       Etiology likely 2/2 HTN      MWF iHD at [facility]  # septic shock 2/2 cholecystitis     per surgery and ICU team   #h/o HTN, now with Hypotension     hold BP meds     on Levophed  # Anemia of CKD     Goal Hgb 10-11   Check iron stores    Start KEVIN   #CKD-MBD      Managed at HD unit  #Chronic systolic HF  # HLD  # CAD  # Elevated LFTs, trending up     PLAN:  - No HD today   - HD q MWF and prn   - UF as tolerated  - strict IO  - fluid restriction 1L   - No dietary protein restrictions  - Dose all meds per ESRD  - follow Blood cultures   - antibiotics per primary team   - Transfuse as needed to maintain Hgb > 7  - Check iron stores

## 2020-12-15 NOTE — ASSESSMENT & PLAN NOTE
Started miconazole, improving  Swelling down  Discussed with urology, continue minimal manipulation, do not retract foreskin, continue conservative management  Outpatient referral to urology for possible circumcision if still having issues

## 2020-12-15 NOTE — PROGRESS NOTES
Pt to unit, aox4, denies pain, on 3L nasal canula. POC reviewed, levophed verified. Call light within reach. Needs met at this time.

## 2020-12-15 NOTE — PROGRESS NOTES
1600 Pt back from drain placement, lethargic, oriented times four, follows simple commands, moves all four extremities. Pt reporting abdominal discomfort, pt repositioned. Pt's wife at bedside, questions and concerns addressed. Dialysis nurse at bedside for scheduled dialysis.  Call light within reach.

## 2020-12-15 NOTE — DISCHARGE PLANNING
ER CM unable to get thru in room or cell yesterday.  Spoke with wife briefly yesterday but unable to do DC assessment at that time.Today cell phone charging so reached out to wife, Yaquelin again. She was able to do DC planning questions. She notes he was doing well mobility wise to and from dialysis but concerned for rapid decline. He lives in 1 story home with spouse and large lab dog ( pt is very attached to his dog) . 1 step into house but thru garage no steps. Has a walker and cane at home. Has o2 via a1 including concentrator. Dialysis MWF at [facility]. He was in the process of starting referral to [facility] when became ill and never completed intake. PCP HUAN Hartman. RX filled at [facility]    Care Transition Team Assessment    Information Source  Orientation : Oriented x 4  Information Given By: Spouse  Informant's Name: Yaquelin  Who is responsible for making decisions for patient? : Patient         Elopement Risk  Legal Hold: No  Ambulatory or Self Mobile in Wheelchair: No-Not an Elopement Risk  Elopement Risk: Not at Risk for Elopement    Interdisciplinary Discharge Planning  Primary Care Physician: Dr Hartman  Lives with - Patient's Self Care Capacity: Spouse  Support Systems: Spouse / Significant Other  Housing / Facility: 1 Story House(1step. No steps in garage.)  Do You Take your Prescribed Medications Regularly: Yes  Mobility Issues: No  Prior Services: None, Other (Comments)(Dialysis 3wk MWF)  Assistance Needed: No  Durable Medical Equipment: Home Oxygen, Walker(cane)  DME Provider / Phone: A1 oxygen(concentrator and tanks)              Finances  Prescription Coverage: Yes([facility])                   Domestic Abuse  Have you ever been the victim of abuse or violence?: No              Anticipated Discharge Information  Discharge Disposition: Still a Patient (30)

## 2020-12-15 NOTE — ASSESSMENT & PLAN NOTE
CT reviewed  Percutanous Gall bladder drain placed by Dr. Stovall in IR on 12/14  Surgery following, plan for drain x 6 weeks  LFTs normalizing, bili wnl; viral hep panel negative  Cultures so far negative, no fever and no elevated WBC  Dc IV abx, consolidate to augmentin x 14 days

## 2020-12-15 NOTE — PROGRESS NOTES
Critical Care Progress Note    Date of admission  12/14/2020    Chief Complaint  80 y.o. male admitted 12/14/2020 with penile pain    Hospital Course  80 y.o. male who presented 12/14/2020 with groin pain with urge to urinate. No obvious cause of it.  No abdominal pain.  Lactic was elevated and so abdominal CT done concerning for acute cholecystitis. AST and ALT slightly elevated but TB is normal Alk phos 122.  RLL infiltrate  Dr. Martines consulted from general surgery unclear if from primary cholecystitis.  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        Hungry   HENT: Negative.    Eyes: Negative.    Respiratory: Negative.    Cardiovascular: Negative.    Gastrointestinal: Negative.    Genitourinary: Negative.         Penile pain   Musculoskeletal: Negative.    Skin: Negative.    Neurological: Negative.    Endo/Heme/Allergies: Negative.    Psychiatric/Behavioral: Negative.         Vital Signs for last 24 hours   Temp:  [35.9 °C (96.7 °F)-36.6 °C (97.8 °F)] 36.5 °C (97.7 °F)  Pulse:  [26-94] 86  Resp:  [14-52] 14  BP: ()/(34-62) 87/52  SpO2:  [86 %-100 %] 92 %            Physical Exam   Physical Exam  HENT:      Head: Normocephalic and atraumatic.      Nose: Nose normal.      Mouth/Throat:      Mouth: Mucous membranes are moist.   Eyes:      Extraocular Movements: Extraocular movements intact.      Pupils: Pupils are equal, round, and reactive to light.   Neck:      Musculoskeletal:  Normal range of motion.   Cardiovascular:      Rate and Rhythm: Normal rate.   Pulmonary:      Effort: Pulmonary effort is normal.      Breath sounds: Normal breath sounds.   Abdominal:      General: Bowel sounds are normal.   Musculoskeletal: Normal range of motion.   Neurological:      General: No focal deficit present.      Mental Status: He is alert.   Psychiatric:         Mood and Affect: Mood normal.         Behavior: Behavior normal.         Thought Content: Thought content normal.         Judgment: Judgment normal.         Medications  Current Facility-Administered Medications   Medication Dose Route Frequency Provider Last Rate Last Admin   • norepinephrine (Levophed) infusion 8 mg/250 mL (premix)  0.5-30 mcg/min Intravenous Continuous Osiris Martinez M.D. 9.4 mL/hr at 12/15/20 0815 5 mcg/min at 12/15/20 0815    And   • vasopressin (VASOSTRICT) 20 Units in  mL Infusion  0.03 Units/min Intravenous Continuous Osiris Martinez M.D.   Stopped at 12/15/20 0430   • [START ON 12/16/2020] epoetin (Retacrit) 5,000 Units injection (Dialysis Use Only)  5,000 Units Intravenous MO, WE + FR ILAN Carrington       • miconazole (MICOTIN) 2 % cream   Topical BID Osiris Maritnez M.D.   Given at 12/15/20 1059   • metroNIDAZOLE (FLAGYL) tablet 500 mg  500 mg Oral Q8HRS Osiris Martinez M.D.       • cefTRIAXone (ROCEPHIN) 2 g in  mL IVPB  2 g Intravenous Q24HRS Osiris Martinez M.D.   Stopped at 12/15/20 0602   • hydrocortisone sodium succinate PF (SOLU-CORTEF) 100 MG injection 100 mg  100 mg Intravenous Q8HRS Osiris Martinez M.D.   100 mg at 12/15/20 0532   • heparin injection 5,000 Units  5,000 Units Subcutaneous Q8HRS Osiris Martinez M.D.   5,000 Units at 12/15/20 0531   • heparin injection 1,500 Units  1,500 Units Intravenous DIALYSIS PRN Mary Jo Beatty M.D.   1,500 Units at 12/14/20 1607   • midodrine (PROAMATINE) tablet 10 mg  10 mg Oral TID WITH MEALS Newell  ROLDAN Martinez       • lidocaine 5%/vitamin A&D (LIDO/A&D) oint-compound   Topical BID PRN Osiris Martinez M.D.       • morphine (pf) 4 mg/mL injection 1 mg  1 mg Intravenous Q3HRS PRN Lucy Beyer M.D.           Fluids    Intake/Output Summary (Last 24 hours) at 12/15/2020 1135  Last data filed at 12/15/2020 1000  Gross per 24 hour   Intake 1277.15 ml   Output 2630 ml   Net -1352.85 ml       Laboratory          Recent Labs     12/14/20  0611 12/15/20  0500   SODIUM 135 141   POTASSIUM 6.6* 4.7   CHLORIDE 89* 97   CO2 14* 27   BUN 69* 42*   CREATININE 9.11* 6.03*   CALCIUM 10.5* 9.7     Recent Labs     12/14/20  0611 12/14/20  0820 12/15/20  0500   ALTSGPT 71*  --  226*   ASTSGOT 84*  --  203*   ALKPHOSPHAT 122*  --  113*   TBILIRUBIN 1.0  --  0.6   LIPASE  --  63*  --    GLUCOSE 79  --  143*     Recent Labs     12/14/20  0611 12/15/20  0500   WBC 11.1* 9.2   NEUTSPOLYS 87.90* 95.20*   LYMPHOCYTES 3.90* 1.40*   MONOCYTES 7.30 2.80   EOSINOPHILS 0.10 0.00   BASOPHILS 0.20 0.10   ASTSGOT 84* 203*   ALTSGPT 71* 226*   ALKPHOSPHAT 122* 113*   TBILIRUBIN 1.0 0.6     Recent Labs     12/14/20  0611 12/14/20  1205 12/15/20  0500   RBC 2.52*  --  2.48*   HEMOGLOBIN 8.1*  --  7.8*   HEMATOCRIT 28.4*  --  27.2*   PLATELETCT 211  --  205   PROTHROMBTM  --  18.2*  --    APTT  --  34.9  --    INR  --  1.55*  --        Imaging  No new imaging    Assessment/Plan  Acalculous cholecystitis  Assessment & Plan  With no abdominal pain, elevated LFTs and CT c/w with gall bladder wall thickening  Appreciate Dr. Lopez' help  Percutanous Gall bladder drain placed by Dr. Stovall in IR on 12/14  Cultures so far negative, no fever and no elevated wbc  Ceftriaxone and metronidazole another 24 hrs would suggest stopping Abx if all cxs negative - would need to confirm with surgery  Lactic trended down    Balanitis  Assessment & Plan  Unclear  If does not resolve will need to consult urology  Improved also ? If  fungal  Fungal cream  On Abx  Swelling down and if not resolved then urology for advice  Reassured pt that midodrine does not cause the swelling    Chronic systolic congestive heart failure (HCC)- (present on admission)  Assessment & Plan  Holding BP meds as on levophed  Pt still refusing midodrine  He was on it outpt due to persistent low bp  MAPs targeted for 60  Levophed at 5 mcgs    ESRD (end stage renal disease) (HCC)- (present on admission)  Assessment & Plan  Dialysis M/W/F  Received yesterday    Lactic acidosis- (present on admission)  Assessment & Plan  Due to hypotension and started on levophed  Unable to give dx of sepsis as only one SIRS criteria met which was tachypnea  Titrated to normal up slightly        VTE:  Heparin  Ulcer: Not Indicated  Lines: Central Line  Ongoing indication addressed on levophed    I have performed a physical exam and reviewed and updated ROS and Plan today (12/15/2020). In review of yesterday's note (12/14/2020), there are no changes except as documented above.     Discussed patient condition and risk of morbidity and/or mortality with Charge nurse / hot rounds  The patient remains critically ill.  Critical care time = 32 minutes in directly providing and coordinating critical care and extensive data review.  No time overlap and excludes procedures.

## 2020-12-15 NOTE — DISCHARGE PLANNING
Outpatient Dialysis Note    Confirmed patient is active at:    [facility]  [id number]  [place] [address]    Schedule: Monday, Wednesday, Friday   Time: 1:30pm    Patient is seen by Dr. Pisano in HD clinic.    Spoke with Wen at facility who confirmed.    Forwarded records for review.    Kate Mei- Dialysis Coordinator  [facility] # [phone]

## 2020-12-15 NOTE — PROGRESS NOTES
[facility] Progress Note     Hemodialysis treatment ordered today per ALAN Sands x 3 hours.   Treatment initiated at 1621 ended at 1921.      Patient tolerated tx; see paper flow sheet for details.   UF odered was 1L, but pt. requested for 2L.    Net UF 2,000 mL.        Needles removed from access site. Dressings applied and sites held x 10 minutes; verified no bleeding. Positive bruit/thrill post tx. Staff RN to monitor AVF/G for breakthrough bleeding. Should breakthrough bleeding occur, staff RN to apply pressure to access sites until bleeding resolved. Notify Dialysis and Nephrologist for follow-up.     Report given to Primary RN.

## 2020-12-15 NOTE — PROGRESS NOTES
"Surgical Progress Note:    S:  - Feeling well  - Mild RUQ pain  - Ambulating  - No chest pain, extremity pain, or difficulty breathing    Vitals:  BP (!) 81/43   Pulse (!) 41   Temp 36.6 °C (97.9 °F) (Temporal)   Resp (!) 22   Ht 1.702 m (5' 7\")   Wt 90.7 kg (199 lb 15.3 oz)   SpO2 (!) 82%   BMI 31.32 kg/m²     I/O:     Intake/Output Summary (Last 24 hours) at 12/15/2020 1321  Last data filed at 12/15/2020 1200  Gross per 24 hour   Intake 1295.95 ml   Output 2680 ml   Net -1384.05 ml       P/E:  Constitutional: Appears well, no distress  Head/Neck: Normocephalic, atraumatic, neck supple  Cardiovascular: Normal rate and rhythm  Pulmonary/Chest: Normal respiratory effort, no wheezes  Abdominal: Soft, mildly tender RUQ, no distension, cholecystostomy with clear bile  Musculoskeletal: No deficits  Neurological:  Alert, oriented  Skin:  Warm and dry, no erythema  Extremities: No edema, no evidence of DVT    Labs:  Recent Labs     12/14/20  0611 12/15/20  0500   WBC 11.1* 9.2   RBC 2.52* 2.48*   HEMOGLOBIN 8.1* 7.8*   HEMATOCRIT 28.4* 27.2*   .7* 109.7*   MCH 32.1 31.5   RDW 79.4* 76.9*   PLATELETCT 211 205   MPV 10.9 10.7   NEUTSPOLYS 87.90* 95.20*   LYMPHOCYTES 3.90* 1.40*   MONOCYTES 7.30 2.80   EOSINOPHILS 0.10 0.00   BASOPHILS 0.20 0.10     Recent Labs     12/14/20  0611 12/15/20  0500   SODIUM 135 141   POTASSIUM 6.6* 4.7   CHLORIDE 89* 97   CO2 14* 27   GLUCOSE 79 143*   BUN 69* 42*         A/P:   - Cholecystostomy and Ceftriaxone/Flagyl for medical management of possible cholecystitis vs primary liver etiology with secondary GB inflammation  - Increased LFTs, normal bili  - Consider GI consult for hepatology opinion  - Multimodal analgesia, try to avoid narcotics  - Heparin/SCDs        Ashlee Martines M.D.  [facility]  [phone]  "

## 2020-12-15 NOTE — ASSESSMENT & PLAN NOTE
Due to hypotension and started on levophed  Unable to give dx of sepsis as only one SIRS criteria met which was tachypnea  Titrated to normal up slightly

## 2020-12-15 NOTE — PROGRESS NOTES
Report received. Assumed care of patient. All lines and drips verified. Patient is resting in bed with no complaints at this time. Patient set up with breakfast. All needs are currently met. All safety precautions in place. Will continue to monitor.

## 2020-12-16 LAB
ALBUMIN SERPL BCP-MCNC: 3.5 G/DL (ref 3.2–4.9)
ALBUMIN/GLOB SERPL: 1.1 G/DL
ALP SERPL-CCNC: 118 U/L (ref 30–99)
ALT SERPL-CCNC: 191 U/L (ref 2–50)
ANION GAP SERPL CALC-SCNC: 19 MMOL/L (ref 7–16)
AST SERPL-CCNC: 83 U/L (ref 12–45)
BILIRUB SERPL-MCNC: 0.4 MG/DL (ref 0.1–1.5)
BUN SERPL-MCNC: 63 MG/DL (ref 8–22)
CALCIUM SERPL-MCNC: 10.1 MG/DL (ref 8.4–10.2)
CHLORIDE SERPL-SCNC: 96 MMOL/L (ref 96–112)
CO2 SERPL-SCNC: 23 MMOL/L (ref 20–33)
CREAT SERPL-MCNC: 7.42 MG/DL (ref 0.5–1.4)
ERYTHROCYTE [DISTWIDTH] IN BLOOD BY AUTOMATED COUNT: 76.1 FL (ref 35.9–50)
FERRITIN SERPL-MCNC: 703 NG/ML (ref 22–322)
GLOBULIN SER CALC-MCNC: 3.1 G/DL (ref 1.9–3.5)
GLUCOSE SERPL-MCNC: 125 MG/DL (ref 65–99)
HCT VFR BLD AUTO: 25.9 % (ref 42–52)
HGB BLD-MCNC: 7.5 G/DL (ref 14–18)
HGB RETIC QN AUTO: 28.6 PG/CELL (ref 29–35)
IMM RETICS NFR: 25.1 % (ref 9.3–17.4)
IRON SATN MFR SERPL: 20 % (ref 15–55)
IRON SERPL-MCNC: 46 UG/DL (ref 50–180)
MCH RBC QN AUTO: 32.1 PG (ref 27–33)
MCHC RBC AUTO-ENTMCNC: 29 G/DL (ref 33.7–35.3)
MCV RBC AUTO: 110.7 FL (ref 81.4–97.8)
PLATELET # BLD AUTO: 181 K/UL (ref 164–446)
PMV BLD AUTO: 10.7 FL (ref 9–12.9)
POTASSIUM SERPL-SCNC: 5 MMOL/L (ref 3.6–5.5)
PROT SERPL-MCNC: 6.6 G/DL (ref 6–8.2)
RBC # BLD AUTO: 2.34 M/UL (ref 4.7–6.1)
RETICS # AUTO: 0.12 M/UL (ref 0.04–0.06)
RETICS/RBC NFR: 5.1 % (ref 0.8–2.1)
SODIUM SERPL-SCNC: 138 MMOL/L (ref 135–145)
TIBC SERPL-MCNC: 230 UG/DL (ref 250–450)
UIBC SERPL-MCNC: 184 UG/DL (ref 110–370)
WBC # BLD AUTO: 7.7 K/UL (ref 4.8–10.8)

## 2020-12-16 PROCEDURE — 80053 COMPREHEN METABOLIC PANEL: CPT

## 2020-12-16 PROCEDURE — 700105 HCHG RX REV CODE 258: Performed by: INTERNAL MEDICINE

## 2020-12-16 PROCEDURE — A9270 NON-COVERED ITEM OR SERVICE: HCPCS | Performed by: INTERNAL MEDICINE

## 2020-12-16 PROCEDURE — 90935 HEMODIALYSIS ONE EVALUATION: CPT

## 2020-12-16 PROCEDURE — 700101 HCHG RX REV CODE 250: Performed by: STUDENT IN AN ORGANIZED HEALTH CARE EDUCATION/TRAINING PROGRAM

## 2020-12-16 PROCEDURE — 99291 CRITICAL CARE FIRST HOUR: CPT | Performed by: INTERNAL MEDICINE

## 2020-12-16 PROCEDURE — 85027 COMPLETE CBC AUTOMATED: CPT

## 2020-12-16 PROCEDURE — 700111 HCHG RX REV CODE 636 W/ 250 OVERRIDE (IP): Performed by: NURSE PRACTITIONER

## 2020-12-16 PROCEDURE — 700102 HCHG RX REV CODE 250 W/ 637 OVERRIDE(OP): Performed by: INTERNAL MEDICINE

## 2020-12-16 PROCEDURE — 83550 IRON BINDING TEST: CPT

## 2020-12-16 PROCEDURE — 700111 HCHG RX REV CODE 636 W/ 250 OVERRIDE (IP): Performed by: STUDENT IN AN ORGANIZED HEALTH CARE EDUCATION/TRAINING PROGRAM

## 2020-12-16 PROCEDURE — 770022 HCHG ROOM/CARE - ICU (200)

## 2020-12-16 PROCEDURE — 700111 HCHG RX REV CODE 636 W/ 250 OVERRIDE (IP): Performed by: INTERNAL MEDICINE

## 2020-12-16 PROCEDURE — 85046 RETICYTE/HGB CONCENTRATE: CPT

## 2020-12-16 PROCEDURE — 82728 ASSAY OF FERRITIN: CPT

## 2020-12-16 PROCEDURE — 83540 ASSAY OF IRON: CPT

## 2020-12-16 PROCEDURE — 700111 HCHG RX REV CODE 636 W/ 250 OVERRIDE (IP): Performed by: HOSPITALIST

## 2020-12-16 RX ORDER — FLUDROCORTISONE ACETATE 0.1 MG/1
0.1 TABLET ORAL EVERY MORNING
Status: DISCONTINUED | OUTPATIENT
Start: 2020-12-16 | End: 2020-12-17

## 2020-12-16 RX ORDER — LIDOCAINE HYDROCHLORIDE 10 MG/ML
1 INJECTION, SOLUTION INFILTRATION; PERINEURAL
Status: DISCONTINUED | OUTPATIENT
Start: 2020-12-16 | End: 2020-12-19 | Stop reason: HOSPADM

## 2020-12-16 RX ADMIN — METRONIDAZOLE 500 MG: 500 TABLET ORAL at 21:08

## 2020-12-16 RX ADMIN — MICONAZOLE NITRATE: 20 CREAM TOPICAL at 06:14

## 2020-12-16 RX ADMIN — HYDROCORTISONE SODIUM SUCCINATE 100 MG: 100 INJECTION, POWDER, FOR SOLUTION INTRAMUSCULAR; INTRAVENOUS at 21:08

## 2020-12-16 RX ADMIN — LIDOCAINE HYDROCHLORIDE 1 ML: 10 INJECTION, SOLUTION INFILTRATION; PERINEURAL at 08:10

## 2020-12-16 RX ADMIN — FLUDROCORTISONE ACETATE 0.1 MG: 0.1 TABLET ORAL at 13:40

## 2020-12-16 RX ADMIN — METRONIDAZOLE 500 MG: 500 TABLET ORAL at 06:14

## 2020-12-16 RX ADMIN — MIDODRINE HYDROCHLORIDE 10 MG: 5 TABLET ORAL at 11:53

## 2020-12-16 RX ADMIN — MIDODRINE HYDROCHLORIDE 10 MG: 5 TABLET ORAL at 07:58

## 2020-12-16 RX ADMIN — EPOETIN ALFA-EPBX 5000 UNITS: 3000 INJECTION, SOLUTION INTRAVENOUS; SUBCUTANEOUS at 10:45

## 2020-12-16 RX ADMIN — MORPHINE SULFATE 1 MG: 4 INJECTION INTRAVENOUS at 04:54

## 2020-12-16 RX ADMIN — HEPARIN SODIUM 5000 UNITS: 5000 INJECTION, SOLUTION INTRAVENOUS; SUBCUTANEOUS at 21:08

## 2020-12-16 RX ADMIN — CEFTRIAXONE SODIUM 2 G: 2 INJECTION, POWDER, FOR SOLUTION INTRAMUSCULAR; INTRAVENOUS at 06:15

## 2020-12-16 RX ADMIN — HEPARIN SODIUM 5000 UNITS: 5000 INJECTION, SOLUTION INTRAVENOUS; SUBCUTANEOUS at 13:40

## 2020-12-16 RX ADMIN — METRONIDAZOLE 500 MG: 500 TABLET ORAL at 13:40

## 2020-12-16 RX ADMIN — HEPARIN SODIUM 1500 UNITS: 1000 INJECTION, SOLUTION INTRAVENOUS; SUBCUTANEOUS at 08:15

## 2020-12-16 RX ADMIN — MIDODRINE HYDROCHLORIDE 10 MG: 5 TABLET ORAL at 17:33

## 2020-12-16 RX ADMIN — HYDROCORTISONE SODIUM SUCCINATE 100 MG: 100 INJECTION, POWDER, FOR SOLUTION INTRAMUSCULAR; INTRAVENOUS at 13:40

## 2020-12-16 RX ADMIN — HEPARIN SODIUM 5000 UNITS: 5000 INJECTION, SOLUTION INTRAVENOUS; SUBCUTANEOUS at 06:13

## 2020-12-16 RX ADMIN — HYDROCORTISONE SODIUM SUCCINATE 100 MG: 100 INJECTION, POWDER, FOR SOLUTION INTRAMUSCULAR; INTRAVENOUS at 06:00

## 2020-12-16 RX ADMIN — MICONAZOLE NITRATE: 20 CREAM TOPICAL at 17:33

## 2020-12-16 ASSESSMENT — ENCOUNTER SYMPTOMS
ABDOMINAL PAIN: 1
NEUROLOGICAL NEGATIVE: 1
CARDIOVASCULAR NEGATIVE: 1
RESPIRATORY NEGATIVE: 1
MUSCULOSKELETAL NEGATIVE: 1
PSYCHIATRIC NEGATIVE: 1
EYES NEGATIVE: 1
GASTROINTESTINAL NEGATIVE: 1

## 2020-12-16 ASSESSMENT — PAIN DESCRIPTION - PAIN TYPE
TYPE: ACUTE PAIN
TYPE: ACUTE PAIN

## 2020-12-16 NOTE — PROGRESS NOTES
Dialysis treatment started @ 0820 and ended @ 1120.    Net UF removed: 1000ml    Treatment delayed due to patient refusing to be dialyze because he wanted to sit in chair. Dialysis RN explained and educated patient, still continues to refuse. Primary RN compromised, bed positioned into chair. AVF access +B/T post tx. Dressing changed. Report given to ROCHELLE Steinberg.

## 2020-12-16 NOTE — PROGRESS NOTES
[facility] Nephrology Daily Progress Note    Date of Service  12/16/2020     Author: Aurora MARRERO, CNN-NP  Collaborating Physician: Dr. Mary Jo Beatty     Chief Complaint  81 yo M PMH ESRD MWF iHD, HTN, anemia of CKD, and CKD-MBD who presents to ED with CC as above.He normally dialyzes at [facility]. Nephrology was asked to consult for ESRD, HD management and maintenance respiectively.   Patient described pain since Friday, Sharp, burning, constant pain.  Unable to sleep this weekend.  No abdominal pain or back pain.  No fever chills.  No nausea or vomiting.  No history for similar symptoms.  In ED patient was found to have 100 ml in bladder by bladder scan, lactate resulted as 9.7 therefore patient was started on broad- spectrum AB. Thereafter patient dropped BP, received IV fluids w/o improvement, started on levophed. CT abdomen showed Acute cholecystitis, surgery was consulted for urgent percutaneous cholecystotomy. COVID test (-).        Daily Nephrology Summary:  12/14: consult done, HD performed  12/15: in ICU on pressors, sitting up in bed, feeling okay, tolerated HD yesterday with UF: 2.0L, hesitant about restarting Midodrine as he feels this is what caused his problem with gall bladder   12/16: sitting up in bed, arguing with staff about getting HD in chair or bed, compromised and pt is sitting up in bed for HD, reports pain is improved some in abdomen and penis, still on levo     Review of Systems  Review of Systems   Constitutional: Positive for malaise/fatigue.   HENT: Negative.    Eyes: Negative.    Respiratory: Negative.    Cardiovascular: Negative.    Gastrointestinal: Positive for abdominal pain.   Genitourinary: Negative.    Musculoskeletal: Negative.    Skin: Negative.    Neurological: Negative.    Endo/Heme/Allergies: Negative.    Psychiatric/Behavioral: Negative.         Physical Exam  Temp:  [36.2 °C (97.1 °F)-36.7 °C (98 °F)] 36.2 °C (97.1 °F)  Pulse:  [41-93] 62  Resp:   [11-75] 11  BP: ()/(28-71) 84/49  SpO2:  [79 %-100 %] 94 %    Physical Exam  Constitutional:       Appearance: Normal appearance. He is normal weight.   HENT:      Head: Normocephalic and atraumatic.      Nose: Nose normal.      Mouth/Throat:      Mouth: Mucous membranes are dry.      Pharynx: Oropharynx is clear.   Eyes:      Extraocular Movements: Extraocular movements intact.      Conjunctiva/sclera: Conjunctivae normal.      Pupils: Pupils are equal, round, and reactive to light.   Neck:      Musculoskeletal: Normal range of motion and neck supple.   Cardiovascular:      Rate and Rhythm: Normal rate and regular rhythm.   Pulmonary:      Effort: Pulmonary effort is normal.      Breath sounds: Normal breath sounds.      Comments: BS diminished in bases   Abdominal:      General: Abdomen is flat. Bowel sounds are normal.      Tenderness: There is abdominal tenderness.   Musculoskeletal: Normal range of motion.   Skin:     General: Skin is warm and dry.   Neurological:      General: No focal deficit present.      Mental Status: He is alert and oriented to person, place, and time. Mental status is at baseline.   Psychiatric:         Mood and Affect: Mood normal.         Behavior: Behavior normal.         Thought Content: Thought content normal.         Judgment: Judgment normal.     L AVF + B&T present     Fluids    Intake/Output Summary (Last 24 hours) at 12/16/2020 0805  Last data filed at 12/16/2020 0645  Gross per 24 hour   Intake 981.79 ml   Output 90 ml   Net 891.79 ml       Laboratory  Recent Labs     12/14/20  0611 12/15/20  0500 12/16/20  0450   WBC 11.1* 9.2 7.7   RBC 2.52* 2.48* 2.34*   HEMOGLOBIN 8.1* 7.8* 7.5*   HEMATOCRIT 28.4* 27.2* 25.9*   .7* 109.7* 110.7*   MCH 32.1 31.5 32.1   MCHC 28.5* 28.7* 29.0*   RDW 79.4* 76.9* 76.1*   PLATELETCT 211 205 181   MPV 10.9 10.7 10.7     Recent Labs     12/14/20  0611 12/15/20  0500   SODIUM 135 141   POTASSIUM 6.6* 4.7   CHLORIDE 89* 97   CO2 14* 27    GLUCOSE 79 143*   BUN 69* 42*   CREATININE 9.11* 6.03*   CALCIUM 10.5* 9.7     Recent Labs     12/14/20  1205   APTT 34.9   INR 1.55*     No results for input(s): NTPROBNP in the last 72 hours.        Imaging    Assessment/Plan  ASSESSMENT:  # ESRD      Etiology likely 2/2 HTN      MWF iHD at [facility]  # septic shock 2/2 cholecystitis     per surgery and ICU team   #h/o HTN, now with Hypotension     hold BP meds     on Levophed  # Anemia of CKD     Goal Hgb 10-11   Check iron stores    Start KEVIN   #CKD-MBD      Managed at HD unit  #Chronic systolic HF  # HLD  # CAD  # Elevated LFTs, trending up     PLAN:  - HD today (Wed)  - HD q MWF and prn   - UF as tolerated  - strict IO  - fluid restriction 1L   - No dietary protein restrictions  - Dose all meds per ESRD  - follow Blood cultures   - antibiotics per primary team   - Transfuse as needed to maintain Hgb > 7  - Check iron stores

## 2020-12-16 NOTE — PROGRESS NOTES
Critical Care Progress Note    Date of admission  12/14/2020    Chief Complaint  80 y.o. male admitted 12/14/2020 with penile pain and urgency. Pt attributed/correlated to midodrine.    Hospital Course  80 y.o. male who presented 12/14/2020 with groin pain with urge to urinate. No obvious cause of it.  No abdominal pain.  Lactic was elevated and so abdominal CT done concerning for acute cholecystitis. AST and ALT slightly elevated but TB is normal Alk phos 122.  RLL infiltrate  Dr. Martines consulted from general surgery unclear if from primary cholecystitis.  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  I am relatively familiar with the patient; he has been seen in the office by me most recently 2019.  Surgery, renal consulted  Miconazole started 12/15.    Cardiac: levophed @ 3mcg, midodrine/Solu-Cortef.  Pulm: RA, baseline 2-3L QHS at baseline.  Neuro: intact,conversant, GCS 15  Heme: reviewed, stable  I/O: HD today, scheduled MWF. Anuric. Clear/oil appearing fluid. R AVF.  ID:  All cultures NGTD, CTX/Flagyl started 12/14, Perc melinda drain placed 12/14  GI/endo: Bili < 1, AST/ALT peaked now downtrending < 200. Diarrhea.  Labs/Imaging: reviewed  Lines: Perc melinda drain placed 12/14. R IJ CVC.  Mobility: OOB   Symptoms: penile pain improving with antifungal, topical lidocaine    Review of Systems  Review of Systems   Constitutional: Negative for malaise/fatigue.   HENT: Negative.    Eyes: Negative.    Respiratory: Negative.    Cardiovascular: Negative.    Gastrointestinal: Negative.    Genitourinary: Negative.         Penile pain improving   Musculoskeletal: Negative.    Skin: Negative.    Neurological: Negative.     Endo/Heme/Allergies: Negative.    Psychiatric/Behavioral: Negative.       Vital Signs for last 24 hours   Temp:  [36.2 °C (97.1 °F)-36.8 °C (98.2 °F)] 36.8 °C (98.2 °F)  Pulse:  [] 76  Resp:  [11-75] 45  BP: ()/(28-71) 79/50  SpO2:  [78 %-100 %] 92 %       Physical Exam   Physical Exam  Vitals signs and nursing note reviewed.   Constitutional:       Comments: Sitting in chair at bedside   HENT:      Head: Normocephalic and atraumatic.      Nose: Nose normal.      Mouth/Throat:      Mouth: Mucous membranes are moist.   Eyes:      Extraocular Movements: Extraocular movements intact.      Pupils: Pupils are equal, round, and reactive to light.   Neck:      Musculoskeletal: Normal range of motion.   Cardiovascular:      Rate and Rhythm: Normal rate.   Pulmonary:      Effort: Pulmonary effort is normal.      Breath sounds: Normal breath sounds.   Abdominal:      General: Bowel sounds are normal.   Musculoskeletal: Normal range of motion.      Right lower leg: Edema (trace) present.      Left lower leg: Edema (trace) present.   Neurological:      General: No focal deficit present.      Mental Status: He is alert and oriented to person, place, and time.   Psychiatric:         Mood and Affect: Mood normal.         Behavior: Behavior normal.         Thought Content: Thought content normal.         Judgment: Judgment normal.       Medications  Current Facility-Administered Medications   Medication Dose Route Frequency Provider Last Rate Last Admin   • lidocaine (XYLOCAINE) 1%  injection  1 mL Intradermal DIALYSIS PRN Mary Jo Beatty M.D.   1 mL at 12/16/20 0810   • fludrocortisone (FLORINEF) tablet 0.1 mg  0.1 mg Oral SANTHOSH Fisher M.D.       • norepinephrine (Levophed) infusion 8 mg/250 mL (premix)  0.5-30 mcg/min Intravenous Continuous Osiris Martinez M.D. 3.8 mL/hr at 12/16/20 1215 2 mcg/min at 12/16/20 1215    And   • vasopressin (VASOSTRICT) 20 Units in  mL Infusion  0.03 Units/min  Intravenous Continuous Osiris Martinez M.D.   Stopped at 12/15/20 0430   • epoetin (Retacrit) 5,000 Units injection (Dialysis Use Only)  5,000 Units Intravenous MO, WE + FR ILAN Carrington   5,000 Units at 12/16/20 1045   • miconazole (MICOTIN) 2 % cream   Topical BID Osiris Martinez M.D.   Given at 12/16/20 0614   • metroNIDAZOLE (FLAGYL) tablet 500 mg  500 mg Oral Q8HRS Osiris Martinez M.D.   500 mg at 12/16/20 0614   • cefTRIAXone (ROCEPHIN) 2 g in  mL IVPB  2 g Intravenous Q24HRS Osiris Martinez M.D.   Stopped at 12/16/20 0645   • hydrocortisone sodium succinate PF (SOLU-CORTEF) 100 MG injection 100 mg  100 mg Intravenous Q8HRS Osiris Martinez M.D.   100 mg at 12/16/20 0600   • heparin injection 5,000 Units  5,000 Units Subcutaneous Q8HRS Osiris Martinez M.D.   5,000 Units at 12/16/20 0613   • heparin injection 1,500 Units  1,500 Units Intravenous DIALYSIS PRN Mary Jo Beatty M.D.   1,500 Units at 12/16/20 0815   • midodrine (PROAMATINE) tablet 10 mg  10 mg Oral TID WITH MEALS Osiris Martinez M.D.   10 mg at 12/16/20 1153   • lidocaine 5%/vitamin A&D (LIDO/A&D) oint-compound   Topical BID PRN Osiris Martinez M.D.       • morphine (pf) 4 mg/mL injection 1 mg  1 mg Intravenous Q3HRS PRN Lucy Beyer M.D.   1 mg at 12/16/20 0454     Fluids    Intake/Output Summary (Last 24 hours) at 12/16/2020 1302  Last data filed at 12/16/2020 1200  Gross per 24 hour   Intake 1058.57 ml   Output 1540 ml   Net -481.43 ml     Laboratory          Recent Labs     12/14/20  0611 12/15/20  0500 12/16/20  0720   SODIUM 135 141 138   POTASSIUM 6.6* 4.7 5.0   CHLORIDE 89* 97 96   CO2 14* 27 23   BUN 69* 42* 63*   CREATININE 9.11* 6.03* 7.42*   CALCIUM 10.5* 9.7 10.1     Recent Labs     12/14/20  0611 12/14/20  0820 12/15/20  0500 12/16/20  0720   ALTSGPT 71*  --  226* 191*   ASTSGOT 84*  --  203* 83*   ALKPHOSPHAT 122*  --  113* 118*   TBILIRUBIN 1.0  --  0.6 0.4    LIPASE  --  63*  --   --    GLUCOSE 79  --  143* 125*     Recent Labs     12/14/20  0611 12/15/20  0500 12/16/20  0450 12/16/20  0720   WBC 11.1* 9.2 7.7  --    NEUTSPOLYS 87.90* 95.20*  --   --    LYMPHOCYTES 3.90* 1.40*  --   --    MONOCYTES 7.30 2.80  --   --    EOSINOPHILS 0.10 0.00  --   --    BASOPHILS 0.20 0.10  --   --    ASTSGOT 84* 203*  --  83*   ALTSGPT 71* 226*  --  191*   ALKPHOSPHAT 122* 113*  --  118*   TBILIRUBIN 1.0 0.6  --  0.4     Recent Labs     12/14/20  0611 12/14/20  1205 12/15/20  0500 12/16/20  0450   RBC 2.52*  --  2.48* 2.34*   HEMOGLOBIN 8.1*  --  7.8* 7.5*   HEMATOCRIT 28.4*  --  27.2* 25.9*   PLATELETCT 211  --  205 181   PROTHROMBTM  --  18.2*  --   --    APTT  --  34.9  --   --    INR  --  1.55*  --   --    IRON  --   --   --  46*   FERRITIN  --   --   --  703.0*   TOTIRONBC  --   --   --  230*     Imaging  CT abd/pelv with contrast reviewed:  Ascites around liver/GB  Lobular liver  Improved R pleural fluid, with new thickening  RLL consolidation    Assessment/Plan  Acalculous cholecystitis  Assessment & Plan  CT reviewed, ? Referred from R effusion  Percutanous Gall bladder drain placed by Dr. Stovall in IR on 12/14  Surgery following  LFTs normalizing, bili wnl; viral hep panel negative  Cultures so far negative, no fever and no elevated WBC  Cont CTX/flagyl, ? MRCP or triphasic liver scan    Balanitis  Assessment & Plan  Started miconazole, improving  Swelling down and if not resolved then urology for advice  Reassured pt that midodrine does not cause the swelling    Chronic systolic congestive heart failure (HCC)- (present on admission)  Assessment & Plan  Holding BP meds as on levophed  Starting midodrine, cont hydrocort  Start florinef  MAPs targeted for 60  Levophed at 3 mcgs and downtrending    Pleural effusion  Assessment & Plan  Chronic  Appears organized on CT  May be transdiaphragmatic inflammation from liver issues or vis versa  Minimally symptomatic, on room air;  ambulate on RA prior to discharge to determine O2 needs    ESRD (end stage renal disease) (HCC)- (present on admission)  Assessment & Plan  Dialysis M/W/F scheduled    Lactic acidosis- (present on admission)  Assessment & Plan  Due to hypotension and started on levophed  Unable to give dx of sepsis as only one SIRS criteria met which was tachypnea  Titrated to normal up slightly      VTE:  Heparin  Ulcer: Not Indicated  Lines: Central Line  Ongoing indication addressed on levophed    I have performed a physical exam and reviewed and updated ROS and Plan today (12/16/2020). In review of yesterday's note (12/15/2020), there are no changes except as documented above.     Discussed patient condition and risk of morbidity and/or mortality with RN, RT, Pharmacy, Charge nurse / hot rounds and Patient     The patient remains critically ill.  Critical care time = 40 minutes in directly providing and coordinating critical care and extensive data review.  No time overlap and excludes procedures.    This note was generated using voice recognition software which has a chance of producing errors of grammar and content.  I have made every reasonable attempt to find and correct any errors, but it should be expected that some may not be found prior to finalization of this note.  __________  Eagle Fisher MD  Pulmonary and Critical Care Medicine  [facility]

## 2020-12-16 NOTE — PROGRESS NOTES
Report received. Assumed care of patient. All lines and drips verified. Patient is irritable this morning, demanding to be up in chair for dialysis, compromised with bed in chair mode. Dialysis now being performed. All needs are met. All safety precautions in place. Will continue to monitor.

## 2020-12-16 NOTE — ASSESSMENT & PLAN NOTE
Chronic  Appears organized on CT  May be transdiaphragmatic inflammation from liver issues or vis versa  Minimally symptomatic, on room air; ambulate on RA prior to discharge to determine O2 needs

## 2020-12-17 PROBLEM — E87.20 LACTIC ACIDOSIS: Status: RESOLVED | Noted: 2019-09-13 | Resolved: 2020-12-17

## 2020-12-17 LAB
ALBUMIN SERPL BCP-MCNC: 3.4 G/DL (ref 3.2–4.9)
ALBUMIN/GLOB SERPL: 1.1 G/DL
ALP SERPL-CCNC: 114 U/L (ref 30–99)
ALT SERPL-CCNC: 159 U/L (ref 2–50)
ANION GAP SERPL CALC-SCNC: 16 MMOL/L (ref 7–16)
AST SERPL-CCNC: 62 U/L (ref 12–45)
BILIRUB SERPL-MCNC: 0.3 MG/DL (ref 0.1–1.5)
BUN SERPL-MCNC: 48 MG/DL (ref 8–22)
CALCIUM SERPL-MCNC: 10.1 MG/DL (ref 8.4–10.2)
CHLORIDE SERPL-SCNC: 96 MMOL/L (ref 96–112)
CO2 SERPL-SCNC: 27 MMOL/L (ref 20–33)
CREAT SERPL-MCNC: 5.39 MG/DL (ref 0.5–1.4)
ERYTHROCYTE [DISTWIDTH] IN BLOOD BY AUTOMATED COUNT: 77.9 FL (ref 35.9–50)
GLOBULIN SER CALC-MCNC: 3.1 G/DL (ref 1.9–3.5)
GLUCOSE SERPL-MCNC: 103 MG/DL (ref 65–99)
HCT VFR BLD AUTO: 26.5 % (ref 42–52)
HGB BLD-MCNC: 7.6 G/DL (ref 14–18)
HGB RETIC QN AUTO: 28.6 PG/CELL (ref 29–35)
IMM RETICS NFR: 38.8 % (ref 9.3–17.4)
INR PPP: 1.3 (ref 0.87–1.13)
IRON SATN MFR SERPL: 28 % (ref 15–55)
IRON SERPL-MCNC: 68 UG/DL (ref 50–180)
MCH RBC QN AUTO: 31.7 PG (ref 27–33)
MCHC RBC AUTO-ENTMCNC: 28.7 G/DL (ref 33.7–35.3)
MCV RBC AUTO: 110.4 FL (ref 81.4–97.8)
PLATELET # BLD AUTO: 174 K/UL (ref 164–446)
PMV BLD AUTO: 10.8 FL (ref 9–12.9)
POTASSIUM SERPL-SCNC: 5.1 MMOL/L (ref 3.6–5.5)
PROT SERPL-MCNC: 6.5 G/DL (ref 6–8.2)
PROTHROMBIN TIME: 15.9 SEC (ref 12–14.6)
RBC # BLD AUTO: 2.4 M/UL (ref 4.7–6.1)
RETICS # AUTO: 0.13 M/UL (ref 0.04–0.06)
RETICS/RBC NFR: 5.3 % (ref 0.8–2.1)
SODIUM SERPL-SCNC: 139 MMOL/L (ref 135–145)
TIBC SERPL-MCNC: 246 UG/DL (ref 250–450)
UIBC SERPL-MCNC: 178 UG/DL (ref 110–370)
WBC # BLD AUTO: 5.6 K/UL (ref 4.8–10.8)

## 2020-12-17 PROCEDURE — A9270 NON-COVERED ITEM OR SERVICE: HCPCS | Performed by: INTERNAL MEDICINE

## 2020-12-17 PROCEDURE — 700105 HCHG RX REV CODE 258: Performed by: INTERNAL MEDICINE

## 2020-12-17 PROCEDURE — 85027 COMPLETE CBC AUTOMATED: CPT

## 2020-12-17 PROCEDURE — 700102 HCHG RX REV CODE 250 W/ 637 OVERRIDE(OP): Performed by: INTERNAL MEDICINE

## 2020-12-17 PROCEDURE — 80053 COMPREHEN METABOLIC PANEL: CPT

## 2020-12-17 PROCEDURE — 85610 PROTHROMBIN TIME: CPT

## 2020-12-17 PROCEDURE — 83540 ASSAY OF IRON: CPT

## 2020-12-17 PROCEDURE — 85046 RETICYTE/HGB CONCENTRATE: CPT

## 2020-12-17 PROCEDURE — 770020 HCHG ROOM/CARE - TELE (206)

## 2020-12-17 PROCEDURE — 99233 SBSQ HOSP IP/OBS HIGH 50: CPT | Performed by: INTERNAL MEDICINE

## 2020-12-17 PROCEDURE — 83550 IRON BINDING TEST: CPT

## 2020-12-17 PROCEDURE — 700111 HCHG RX REV CODE 636 W/ 250 OVERRIDE (IP): Performed by: INTERNAL MEDICINE

## 2020-12-17 RX ORDER — LACTOBACILLUS RHAMNOSUS GG 10B CELL
1 CAPSULE ORAL
Status: DISCONTINUED | OUTPATIENT
Start: 2020-12-18 | End: 2020-12-19 | Stop reason: HOSPADM

## 2020-12-17 RX ORDER — AMOXICILLIN AND CLAVULANATE POTASSIUM 500; 125 MG/1; MG/1
1 TABLET, FILM COATED ORAL DAILY
Status: DISCONTINUED | OUTPATIENT
Start: 2020-12-17 | End: 2020-12-19 | Stop reason: HOSPADM

## 2020-12-17 RX ORDER — FLUDROCORTISONE ACETATE 0.1 MG/1
0.2 TABLET ORAL EVERY MORNING
Status: DISCONTINUED | OUTPATIENT
Start: 2020-12-18 | End: 2020-12-19 | Stop reason: HOSPADM

## 2020-12-17 RX ORDER — FLUDROCORTISONE ACETATE 0.1 MG/1
0.1 TABLET ORAL ONCE
Status: COMPLETED | OUTPATIENT
Start: 2020-12-17 | End: 2020-12-17

## 2020-12-17 RX ADMIN — CEFTRIAXONE SODIUM 2 G: 2 INJECTION, POWDER, FOR SOLUTION INTRAMUSCULAR; INTRAVENOUS at 06:03

## 2020-12-17 RX ADMIN — MICONAZOLE NITRATE: 20 CREAM TOPICAL at 18:09

## 2020-12-17 RX ADMIN — HEPARIN SODIUM 5000 UNITS: 5000 INJECTION, SOLUTION INTRAVENOUS; SUBCUTANEOUS at 21:26

## 2020-12-17 RX ADMIN — Medication: at 15:03

## 2020-12-17 RX ADMIN — HEPARIN SODIUM 5000 UNITS: 5000 INJECTION, SOLUTION INTRAVENOUS; SUBCUTANEOUS at 06:03

## 2020-12-17 RX ADMIN — MIDODRINE HYDROCHLORIDE 10 MG: 5 TABLET ORAL at 12:12

## 2020-12-17 RX ADMIN — FLUDROCORTISONE ACETATE 0.1 MG: 0.1 TABLET ORAL at 06:03

## 2020-12-17 RX ADMIN — MIDODRINE HYDROCHLORIDE 10 MG: 5 TABLET ORAL at 18:09

## 2020-12-17 RX ADMIN — MIDODRINE HYDROCHLORIDE 10 MG: 5 TABLET ORAL at 07:41

## 2020-12-17 RX ADMIN — FLUDROCORTISONE ACETATE 0.1 MG: 0.1 TABLET ORAL at 09:58

## 2020-12-17 RX ADMIN — METRONIDAZOLE 500 MG: 500 TABLET ORAL at 06:03

## 2020-12-17 RX ADMIN — AMOXICILLIN AND CLAVULANATE POTASSIUM 1 TABLET: 500; 125 TABLET, FILM COATED ORAL at 12:12

## 2020-12-17 RX ADMIN — HYDROCORTISONE SODIUM SUCCINATE 100 MG: 100 INJECTION, POWDER, FOR SOLUTION INTRAMUSCULAR; INTRAVENOUS at 06:03

## 2020-12-17 RX ADMIN — MICONAZOLE NITRATE: 20 CREAM TOPICAL at 06:02

## 2020-12-17 ASSESSMENT — FIBROSIS 4 INDEX: FIB4 SCORE: 2.59

## 2020-12-17 ASSESSMENT — ENCOUNTER SYMPTOMS
GASTROINTESTINAL NEGATIVE: 1
RESPIRATORY NEGATIVE: 1
ABDOMINAL PAIN: 1
PSYCHIATRIC NEGATIVE: 1
CARDIOVASCULAR NEGATIVE: 1
MUSCULOSKELETAL NEGATIVE: 1
NEUROLOGICAL NEGATIVE: 1
EYES NEGATIVE: 1

## 2020-12-17 ASSESSMENT — PAIN DESCRIPTION - PAIN TYPE
TYPE: ACUTE PAIN
TYPE: ACUTE PAIN

## 2020-12-17 NOTE — PROGRESS NOTES
[facility] Nephrology Daily Progress Note    Date of Service  12/17/2020     Author: Aurora MARRERO, CNN-NP  Collaborating Physician: Dr. Mary Jo Beatty     Chief Complaint  79 yo M PMH ESRD MWF iHD, HTN, anemia of CKD, and CKD-MBD who presents to ED with CC as above.He normally dialyzes at [facility]. Nephrology was asked to consult for ESRD, HD management and maintenance respiectively.   Patient described pain since Friday, Sharp, burning, constant pain.  Unable to sleep this weekend.  No abdominal pain or back pain.  No fever chills.  No nausea or vomiting.  No history for similar symptoms.  In ED patient was found to have 100 ml in bladder by bladder scan, lactate resulted as 9.7 therefore patient was started on broad- spectrum AB. Thereafter patient dropped BP, received IV fluids w/o improvement, started on levophed. CT abdomen showed Acute cholecystitis, surgery was consulted for urgent percutaneous cholecystotomy. COVID test (-).        Daily Nephrology Summary:  12/14: consult done, HD performed  12/15: in ICU on pressors, sitting up in bed, feeling okay, tolerated HD yesterday with UF: 2.0L, hesitant about restarting Midodrine as he feels this is what caused his problem with gall bladder   12/16: sitting up in bed, arguing with staff about getting HD in chair or bed, compromised and pt is sitting up in bed for HD, reports pain is improved some in abdomen and penis, still on levo   12/17: sitting up in chair, off levophed, mild hypotension, tolerated HD yesterday with UF: 1.0L    Review of Systems  Review of Systems   Constitutional: Positive for malaise/fatigue.   HENT: Negative.    Eyes: Negative.    Respiratory: Negative.    Cardiovascular: Negative.    Gastrointestinal: Positive for abdominal pain.   Genitourinary: Negative.    Musculoskeletal: Negative.    Skin: Negative.    Neurological: Negative.    Endo/Heme/Allergies: Negative.    Psychiatric/Behavioral: Negative.         Physical  Exam  Temp:  [36.2 °C (97.1 °F)-36.9 °C (98.4 °F)] 36.3 °C (97.4 °F)  Pulse:  [] 78  Resp:  [13-78] 27  BP: ()/(37-59) 80/53  SpO2:  [78 %-100 %] 92 %    Physical Exam  Constitutional:       Appearance: Normal appearance. He is normal weight.   HENT:      Head: Normocephalic and atraumatic.      Nose: Nose normal.      Mouth/Throat:      Mouth: Mucous membranes are dry.      Pharynx: Oropharynx is clear.   Eyes:      Extraocular Movements: Extraocular movements intact.      Conjunctiva/sclera: Conjunctivae normal.      Pupils: Pupils are equal, round, and reactive to light.   Neck:      Musculoskeletal: Normal range of motion and neck supple.   Cardiovascular:      Rate and Rhythm: Normal rate and regular rhythm.   Pulmonary:      Effort: Pulmonary effort is normal.      Breath sounds: Normal breath sounds.      Comments: BS diminished in bases   Abdominal:      General: Abdomen is flat. Bowel sounds are normal.      Tenderness: There is abdominal tenderness.   Musculoskeletal: Normal range of motion.   Skin:     General: Skin is warm and dry.   Neurological:      General: No focal deficit present.      Mental Status: He is alert and oriented to person, place, and time. Mental status is at baseline.   Psychiatric:         Mood and Affect: Mood normal.         Behavior: Behavior normal.         Thought Content: Thought content normal.         Judgment: Judgment normal.     L AVF + B&T present     Fluids    Intake/Output Summary (Last 24 hours) at 12/17/2020 0817  Last data filed at 12/17/2020 0633  Gross per 24 hour   Intake 1020.02 ml   Output 1700 ml   Net -679.98 ml       Laboratory  Recent Labs     12/15/20  0500 12/16/20  0450 12/17/20  0430   WBC 9.2 7.7 5.6   RBC 2.48* 2.34* 2.40*   HEMOGLOBIN 7.8* 7.5* 7.6*   HEMATOCRIT 27.2* 25.9* 26.5*   .7* 110.7* 110.4*   MCH 31.5 32.1 31.7   MCHC 28.7* 29.0* 28.7*   RDW 76.9* 76.1* 77.9*   PLATELETCT 205 181 174   MPV 10.7 10.7 10.8     Recent Labs      12/15/20  0500 12/16/20  0720 12/17/20  0430   SODIUM 141 138 139   POTASSIUM 4.7 5.0 5.1   CHLORIDE 97 96 96   CO2 27 23 27   GLUCOSE 143* 125* 103*   BUN 42* 63* 48*   CREATININE 6.03* 7.42* 5.39*   CALCIUM 9.7 10.1 10.1     Recent Labs     12/14/20  1205 12/17/20  0430   APTT 34.9  --    INR 1.55* 1.30*     No results for input(s): NTPROBNP in the last 72 hours.        Imaging    Assessment/Plan  ASSESSMENT:  # ESRD      Etiology likely 2/2 HTN      MWF iHD at [facility]  # septic shock 2/2 cholecystitis     per surgery and ICU team   #h/o HTN, now with Hypotension     hold BP meds     s/p Levophed    on midodrine  # Anemia of CKD     Goal Hgb 10-11   Iron replete    Continue KEVIN with HD   #CKD-MBD      Managed at HD unit  #Chronic systolic HF  # HLD  # CAD  # Elevated LFTs, trending up     PLAN:  - HD tomorrow (fri)  - HD q MWF and prn   - UF as tolerated  - strict IO  - fluid restriction 1L   - No dietary protein restrictions  - Dose all meds per ESRD  - follow Blood cultures   - antibiotics per primary team   - Transfuse as needed to maintain Hgb > 7

## 2020-12-17 NOTE — PROGRESS NOTES
"Surgical Progress Note:    S:  - Feeling well  - Mild RUQ pain  - No chest pain, extremity pain, or difficulty breathing    Vitals:  BP (!) 90/47   Pulse 81   Temp 36.8 °C (98.2 °F) (Temporal)   Resp (!) 24   Ht 1.702 m (5' 7\")   Wt 90.7 kg (199 lb 15.3 oz)   SpO2 92%   BMI 31.32 kg/m²     I/O:     Intake/Output Summary (Last 24 hours) at 12/15/2020 1321  Last data filed at 12/15/2020 1200  Gross per 24 hour   Intake 1295.95 ml   Output 2680 ml   Net -1384.05 ml       P/E:  Constitutional: Appears well, no distress  Head/Neck: Normocephalic, atraumatic, neck supple  Cardiovascular: Normal rate and rhythm  Pulmonary/Chest: Normal respiratory effort, no wheezes  Abdominal: Soft, mildly tender RUQ, no distension, cholecystostomy with clear bile  Musculoskeletal: No deficits  Neurological:  Alert, oriented  Skin:  Warm and dry, no erythema  Extremities: No edema, no evidence of DVT    Labs:  Recent Labs     12/14/20  0611 12/15/20  0500 12/16/20  0450   WBC 11.1* 9.2 7.7   RBC 2.52* 2.48* 2.34*   HEMOGLOBIN 8.1* 7.8* 7.5*   HEMATOCRIT 28.4* 27.2* 25.9*   .7* 109.7* 110.7*   MCH 32.1 31.5 32.1   RDW 79.4* 76.9* 76.1*   PLATELETCT 211 205 181   MPV 10.9 10.7 10.7   NEUTSPOLYS 87.90* 95.20*  --    LYMPHOCYTES 3.90* 1.40*  --    MONOCYTES 7.30 2.80  --    EOSINOPHILS 0.10 0.00  --    BASOPHILS 0.20 0.10  --      Recent Labs     12/14/20  0611 12/15/20  0500 12/16/20  0720   SODIUM 135 141 138   POTASSIUM 6.6* 4.7 5.0   CHLORIDE 89* 97 96   CO2 14* 27 23   GLUCOSE 79 143* 125*   BUN 69* 42* 63*         A/P:   - Cholecystostomy and Ceftriaxone/Flagyl for medical management of possible cholecystitis vs primary liver etiology with secondary GB inflammation  - Can transition to PO atbx at discretion of primary team  - Increased LFTs, normal bili  - Consider GI consult for hepatology opinion  - Multimodal analgesia, try to avoid narcotics  - Heparin/SCDs      Ashlee Martines M.D.  [facility]  [phone]

## 2020-12-17 NOTE — PROGRESS NOTES
Report received. Assumed care of patient. Patient is resting well in chair. Patient assisted up to commode and back to chair. All needs are currently met. All safety precautions in place. Will continue to monitor.

## 2020-12-17 NOTE — PROGRESS NOTES
Pt on the floor, assessment complete. Lidocaine applied to penis for pain. Wife called with updated room assignment.

## 2020-12-17 NOTE — PROGRESS NOTES
Critical Care Progress Note    Date of admission  12/14/2020    Chief Complaint  80 y.o. male admitted 12/14/2020 with penile pain and urgency. Pt attributed/correlated to midodrine.    Hospital Course  80 y.o. male who presented 12/14/2020 with groin pain with urge to urinate. No obvious cause of it.  No abdominal pain.  Lactic was elevated and so abdominal CT done concerning for acute cholecystitis. AST and ALT slightly elevated but TB is normal Alk phos 122.  RLL infiltrate  Dr. Martines consulted from general surgery unclear if from primary cholecystitis.  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 Miconazole started 12/15.    Cardiac: off levophed, midodrine/Solu-Cortef, florinef started.  Pulm: RA, baseline 2-3L QHS at baseline.  Neuro: intact,conversant, GCS 15  Heme: reviewed, stable  I/O: UF yesterday scheduled MWF, 1.5L. Anuric at baseline. Drain clear/oil appearing fluid, 200cc.   ID:  All cultures inculding drain NGTD, CTX/Flagyl started 12/14, Perc melinda drain placed 12/14  GI/endo: Bili < 1, AST/ALT continuing to normalize. Diarrhea.  Labs/Imaging: reviewed  Lines: Perc melinda drain placed 12/14. R IJ CVC.  Mobility: OOB   Symptoms: penile pain improving with antifungal, topical lidocaine    Review of Systems  Review of Systems   Constitutional: Negative for malaise/fatigue.   HENT: Negative.    Eyes: Negative.    Respiratory: Negative.    Cardiovascular: Negative.    Gastrointestinal: Negative.    Genitourinary: Negative.         Penile pain improving   Musculoskeletal: Negative.    Skin: Negative.    Neurological: Negative.    Endo/Heme/Allergies: Negative.    Psychiatric/Behavioral: Negative.       Vital Signs  for last 24 hours   Temp:  [36.2 °C (97.1 °F)-36.9 °C (98.4 °F)] 36.3 °C (97.4 °F)  Pulse:  [] 80  Resp:  [12-78] 34  BP: ()/(37-59) 90/50  SpO2:  [78 %-100 %] 92 %       Physical Exam   Physical Exam  Vitals signs and nursing note reviewed.   Constitutional:       Comments: Sitting in chair at bedside   HENT:      Head: Normocephalic and atraumatic.      Nose: Nose normal.      Mouth/Throat:      Mouth: Mucous membranes are moist.   Eyes:      Extraocular Movements: Extraocular movements intact.      Pupils: Pupils are equal, round, and reactive to light.   Neck:      Musculoskeletal: Normal range of motion.   Cardiovascular:      Rate and Rhythm: Normal rate.   Pulmonary:      Effort: Pulmonary effort is normal.      Breath sounds: Normal breath sounds.   Abdominal:      General: Bowel sounds are normal.   Musculoskeletal: Normal range of motion.      Right lower leg: Edema (trace) present.      Left lower leg: Edema (trace) present.   Neurological:      General: No focal deficit present.      Mental Status: He is alert and oriented to person, place, and time.   Psychiatric:         Mood and Affect: Mood normal.         Behavior: Behavior normal.         Thought Content: Thought content normal.         Judgment: Judgment normal.       Medications  Current Facility-Administered Medications   Medication Dose Route Frequency Provider Last Rate Last Admin   • [START ON 12/18/2020] fludrocortisone (FLORINEF) tablet 0.2 mg  0.2 mg Oral SANTHOSH Fisher M.D.       • lidocaine (XYLOCAINE) 1%  injection  1 mL Intradermal DIALYSIS PRN Mary Jo Beatty M.D.   1 mL at 12/16/20 0810   • norepinephrine (Levophed) infusion 8 mg/250 mL (premix)  0.5-30 mcg/min Intravenous Continuous Osiris Martinez M.D.   Stopped at 12/16/20 2200    And   • vasopressin (VASOSTRICT) 20 Units in  mL Infusion  0.03 Units/min Intravenous Continuous Osiris Martinez M.D.   Stopped at 12/15/20 0430   • epoetin (Retacrit)  5,000 Units injection (Dialysis Use Only)  5,000 Units Intravenous MO, WE + FR ILAN Carrington   5,000 Units at 12/16/20 1045   • miconazole (MICOTIN) 2 % cream   Topical BID Osiris Martinez M.D.   Given at 12/17/20 0602   • metroNIDAZOLE (FLAGYL) tablet 500 mg  500 mg Oral Q8HRS Osiris Martinez M.D.   500 mg at 12/17/20 0603   • cefTRIAXone (ROCEPHIN) 2 g in  mL IVPB  2 g Intravenous Q24HRS Osiris Martinez M.D.   Stopped at 12/17/20 0633   • hydrocortisone sodium succinate PF (SOLU-CORTEF) 100 MG injection 100 mg  100 mg Intravenous Q8HRS Osiris Martinez M.D.   100 mg at 12/17/20 0603   • heparin injection 5,000 Units  5,000 Units Subcutaneous Q8HRS Osiris Martinez M.D.   5,000 Units at 12/17/20 0603   • heparin injection 1,500 Units  1,500 Units Intravenous DIALYSIS PRN Mary Jo Beatty M.D.   1,500 Units at 12/16/20 0815   • midodrine (PROAMATINE) tablet 10 mg  10 mg Oral TID WITH MEALS Osiris Martinez M.D.   10 mg at 12/17/20 0741   • lidocaine 5%/vitamin A&D (LIDO/A&D) oint-compound   Topical BID PRN Osiris Martinez M.D.       • morphine (pf) 4 mg/mL injection 1 mg  1 mg Intravenous Q3HRS PRN Lucy Beyer M.D.   1 mg at 12/16/20 0454     Fluids    Intake/Output Summary (Last 24 hours) at 12/17/2020 1020  Last data filed at 12/17/2020 0800  Gross per 24 hour   Intake 1133.82 ml   Output 1700 ml   Net -566.18 ml     Laboratory          Recent Labs     12/15/20  0500 12/16/20  0720 12/17/20  0430   SODIUM 141 138 139   POTASSIUM 4.7 5.0 5.1   CHLORIDE 97 96 96   CO2 27 23 27   BUN 42* 63* 48*   CREATININE 6.03* 7.42* 5.39*   CALCIUM 9.7 10.1 10.1     Recent Labs     12/15/20  0500 12/16/20  0720 12/17/20  0430   ALTSGPT 226* 191* 159*   ASTSGOT 203* 83* 62*   ALKPHOSPHAT 113* 118* 114*   TBILIRUBIN 0.6 0.4 0.3   GLUCOSE 143* 125* 103*     Recent Labs     12/15/20  0500 12/16/20  0450 12/16/20  0720 12/17/20  0430   WBC 9.2 7.7  --  5.6   NEUTSPOLYS  95.20*  --   --   --    LYMPHOCYTES 1.40*  --   --   --    MONOCYTES 2.80  --   --   --    EOSINOPHILS 0.00  --   --   --    BASOPHILS 0.10  --   --   --    ASTSGOT 203*  --  83* 62*   ALTSGPT 226*  --  191* 159*   ALKPHOSPHAT 113*  --  118* 114*   TBILIRUBIN 0.6  --  0.4 0.3     Recent Labs     12/14/20  1205 12/15/20  0500 12/16/20  0450 12/17/20  0430   RBC  --  2.48* 2.34* 2.40*   HEMOGLOBIN  --  7.8* 7.5* 7.6*   HEMATOCRIT  --  27.2* 25.9* 26.5*   PLATELETCT  --  205 181 174   PROTHROMBTM 18.2*  --   --  15.9*   APTT 34.9  --   --   --    INR 1.55*  --   --  1.30*   IRON  --   --  46* 68   FERRITIN  --   --  703.0*  --    TOTIRONBC  --   --  230* 246*     Imaging  CT abd/pelv with contrast reviewed:  Ascites around liver/GB  Lobular liver  Improved R pleural fluid, with new thickening  RLL consolidation    Assessment/Plan    Acalculous cholecystitis  Assessment & Plan  CT reviewed  Percutanous Gall bladder drain placed by Dr. Stovall in IR on 12/14  Surgery following, plan for drain x 6 weeks  LFTs normalizing, bili wnl; viral hep panel negative  Cultures so far negative, no fever and no elevated WBC  Dc IV abx, consolidate to augmentin x 14 days    Balanitis  Assessment & Plan  Started miconazole, improving  Swelling down  Discussed with urology, continue minimal manipulation, do not retract foreskin, continue conservative management  Outpatient referral to urology for possible circumcision if still having issues    Chronic systolic congestive heart failure (HCC)- (present on admission)  Assessment & Plan  Off pressors  Cont midodrine, hydrocort, florinef  MAPs targeted for 60  Resume HF meds when BP permits    Pleural effusion  Assessment & Plan  Chronic  Appears organized on CT  May be transdiaphragmatic inflammation from liver issues or vis versa  Minimally symptomatic, on room air; ambulate on RA prior to discharge to determine O2 needs    ESRD (end stage renal disease) (HCC)- (present on  admission)  Assessment & Plan  Dialysis M/W/F scheduled     VTE:  Heparin  Ulcer: Not Indicated  Lines: Central Line  order placed for removal on levophed    I have performed a physical exam and reviewed and updated ROS and Plan today (12/17/2020). In review of yesterday's note (12/16/2020), there are no changes except as documented above.     Discussed patient condition and risk of morbidity and/or mortality with RN, RT, Pharmacy, Charge nurse / hot rounds and Patient     This note was generated using voice recognition software which has a chance of producing errors of grammar and content.  I have made every reasonable attempt to find and correct any errors, but it should be expected that some may not be found prior to finalization of this note.  __________  Eagle Fisher MD  Pulmonary and Critical Care Medicine  [facility]

## 2020-12-17 NOTE — PROGRESS NOTES
"Surgical Progress Note:    S:  - Feeling well  - Mild RUQ pain  - No chest pain, extremity pain, or difficulty breathing    Vitals:  BP (!) 90/50   Pulse 80   Temp 36.3 °C (97.4 °F) (Temporal)   Resp (!) 34   Ht 1.702 m (5' 7\")   Wt 91.2 kg (201 lb 1 oz)   SpO2 92%   BMI 31.49 kg/m²     I/O:     Intake/Output Summary (Last 24 hours) at 12/15/2020 1321  Last data filed at 12/15/2020 1200  Gross per 24 hour   Intake 1295.95 ml   Output 2680 ml   Net -1384.05 ml       P/E:  Constitutional: Appears well, no distress  Head/Neck: Normocephalic, atraumatic, neck supple  Cardiovascular: Normal rate and rhythm  Pulmonary/Chest: Normal respiratory effort, no wheezes  Abdominal: Soft, mildly tender RUQ, no distension, cholecystostomy with clear bile  Musculoskeletal: No deficits  Neurological:  Alert, oriented  Skin:  Warm and dry, no erythema  Extremities: No edema, no evidence of DVT    Labs:  Recent Labs     12/15/20  0500 12/16/20  0450 12/17/20  0430   WBC 9.2 7.7 5.6   RBC 2.48* 2.34* 2.40*   HEMOGLOBIN 7.8* 7.5* 7.6*   HEMATOCRIT 27.2* 25.9* 26.5*   .7* 110.7* 110.4*   MCH 31.5 32.1 31.7   RDW 76.9* 76.1* 77.9*   PLATELETCT 205 181 174   MPV 10.7 10.7 10.8   NEUTSPOLYS 95.20*  --   --    LYMPHOCYTES 1.40*  --   --    MONOCYTES 2.80  --   --    EOSINOPHILS 0.00  --   --    BASOPHILS 0.10  --   --      Recent Labs     12/15/20  0500 12/16/20  0720 12/17/20  0430   SODIUM 141 138 139   POTASSIUM 4.7 5.0 5.1   CHLORIDE 97 96 96   CO2 27 23 27   GLUCOSE 143* 125* 103*   BUN 42* 63* 48*         A/P:   - Cholecystostomy (to remain in situ for 6 weeks) and Ceftriaxone/Flagyl for medical management of possible cholecystitis vs primary liver etiology with secondary GB inflammation  - Can transition to PO atbx at discretion of primary team  - Increased LFTs, normal bili  - Consider GI consult for hepatology opinion  - Multimodal analgesia, try to avoid narcotics  - Heparin/SCDs    - Will sign off, please call if any " concerns arise  - Follow-up with me in clinic once discharged      Ashlee Martines M.D.  [facility]  [phone]

## 2020-12-18 ENCOUNTER — APPOINTMENT (OUTPATIENT)
Dept: RADIOLOGY | Facility: MEDICAL CENTER | Age: 80
DRG: 871 | End: 2020-12-18
Attending: INTERNAL MEDICINE
Payer: MEDICARE

## 2020-12-18 LAB
ALBUMIN SERPL BCP-MCNC: 3.2 G/DL (ref 3.2–4.9)
ALBUMIN/GLOB SERPL: 0.9 G/DL
ALP SERPL-CCNC: 113 U/L (ref 30–99)
ALT SERPL-CCNC: 125 U/L (ref 2–50)
ANION GAP SERPL CALC-SCNC: 27 MMOL/L (ref 7–16)
AST SERPL-CCNC: 46 U/L (ref 12–45)
BACTERIA FLD AEROBE CULT: NORMAL
BILIRUB SERPL-MCNC: 0.4 MG/DL (ref 0.1–1.5)
BUN SERPL-MCNC: 69 MG/DL (ref 8–22)
CALCIUM SERPL-MCNC: 10.1 MG/DL (ref 8.4–10.2)
CHLORIDE SERPL-SCNC: 98 MMOL/L (ref 96–112)
CO2 SERPL-SCNC: 13 MMOL/L (ref 20–33)
CREAT SERPL-MCNC: 6.9 MG/DL (ref 0.5–1.4)
GLOBULIN SER CALC-MCNC: 3.5 G/DL (ref 1.9–3.5)
GLUCOSE SERPL-MCNC: 93 MG/DL (ref 65–99)
GRAM STN SPEC: NORMAL
INR PPP: 1.18 (ref 0.87–1.13)
POTASSIUM SERPL-SCNC: 5.1 MMOL/L (ref 3.6–5.5)
PROT SERPL-MCNC: 6.7 G/DL (ref 6–8.2)
PROTHROMBIN TIME: 14.7 SEC (ref 12–14.6)
SIGNIFICANT IND 70042: NORMAL
SITE SITE: NORMAL
SODIUM SERPL-SCNC: 138 MMOL/L (ref 135–145)
SOURCE SOURCE: NORMAL

## 2020-12-18 PROCEDURE — 700111 HCHG RX REV CODE 636 W/ 250 OVERRIDE (IP): Performed by: INTERNAL MEDICINE

## 2020-12-18 PROCEDURE — 97165 OT EVAL LOW COMPLEX 30 MIN: CPT

## 2020-12-18 PROCEDURE — 80053 COMPREHEN METABOLIC PANEL: CPT

## 2020-12-18 PROCEDURE — 700102 HCHG RX REV CODE 250 W/ 637 OVERRIDE(OP): Performed by: INTERNAL MEDICINE

## 2020-12-18 PROCEDURE — 85610 PROTHROMBIN TIME: CPT

## 2020-12-18 PROCEDURE — A9270 NON-COVERED ITEM OR SERVICE: HCPCS | Performed by: INTERNAL MEDICINE

## 2020-12-18 PROCEDURE — 700111 HCHG RX REV CODE 636 W/ 250 OVERRIDE (IP): Performed by: NURSE PRACTITIONER

## 2020-12-18 PROCEDURE — 700111 HCHG RX REV CODE 636 W/ 250 OVERRIDE (IP): Performed by: STUDENT IN AN ORGANIZED HEALTH CARE EDUCATION/TRAINING PROGRAM

## 2020-12-18 PROCEDURE — 770020 HCHG ROOM/CARE - TELE (206)

## 2020-12-18 PROCEDURE — 99232 SBSQ HOSP IP/OBS MODERATE 35: CPT | Performed by: INTERNAL MEDICINE

## 2020-12-18 PROCEDURE — 90935 HEMODIALYSIS ONE EVALUATION: CPT

## 2020-12-18 PROCEDURE — 74150 CT ABDOMEN W/O CONTRAST: CPT

## 2020-12-18 PROCEDURE — 700111 HCHG RX REV CODE 636 W/ 250 OVERRIDE (IP): Performed by: HOSPITALIST

## 2020-12-18 RX ADMIN — MORPHINE SULFATE 1 MG: 4 INJECTION INTRAVENOUS at 18:27

## 2020-12-18 RX ADMIN — Medication 1 CAPSULE: at 09:05

## 2020-12-18 RX ADMIN — FLUDROCORTISONE ACETATE 0.2 MG: 0.1 TABLET ORAL at 05:25

## 2020-12-18 RX ADMIN — MIDODRINE HYDROCHLORIDE 10 MG: 5 TABLET ORAL at 05:26

## 2020-12-18 RX ADMIN — MICONAZOLE NITRATE: 20 CREAM TOPICAL at 05:25

## 2020-12-18 RX ADMIN — Medication: at 05:33

## 2020-12-18 RX ADMIN — AMOXICILLIN AND CLAVULANATE POTASSIUM 1 TABLET: 500; 125 TABLET, FILM COATED ORAL at 05:25

## 2020-12-18 RX ADMIN — HEPARIN SODIUM 1500 UNITS: 1000 INJECTION, SOLUTION INTRAVENOUS; SUBCUTANEOUS at 07:16

## 2020-12-18 RX ADMIN — HEPARIN SODIUM 5000 UNITS: 5000 INJECTION, SOLUTION INTRAVENOUS; SUBCUTANEOUS at 05:25

## 2020-12-18 RX ADMIN — MICONAZOLE NITRATE: 20 CREAM TOPICAL at 18:00

## 2020-12-18 RX ADMIN — MIDODRINE HYDROCHLORIDE 10 MG: 5 TABLET ORAL at 09:05

## 2020-12-18 RX ADMIN — EPOETIN ALFA-EPBX 5000 UNITS: 3000 INJECTION, SOLUTION INTRAVENOUS; SUBCUTANEOUS at 09:18

## 2020-12-18 ASSESSMENT — ENCOUNTER SYMPTOMS
RESPIRATORY NEGATIVE: 1
CONSTIPATION: 0
HEARTBURN: 0
SENSORY CHANGE: 0
NERVOUS/ANXIOUS: 0
CHILLS: 0
MYALGIAS: 1
EYES NEGATIVE: 1
DIARRHEA: 0
WEAKNESS: 0
BRUISES/BLEEDS EASILY: 1
CONSTITUTIONAL NEGATIVE: 1
INSOMNIA: 0
FEVER: 0
CLAUDICATION: 0
ABDOMINAL PAIN: 0
ABDOMINAL PAIN: 1
HEADACHES: 0
COUGH: 0
DEPRESSION: 0
SPEECH CHANGE: 0
VOMITING: 0
MYALGIAS: 0
SHORTNESS OF BREATH: 0
BLURRED VISION: 0
DIZZINESS: 0
SORE THROAT: 0
PHOTOPHOBIA: 0

## 2020-12-18 ASSESSMENT — COGNITIVE AND FUNCTIONAL STATUS - GENERAL
DAILY ACTIVITIY SCORE: 24
SUGGESTED CMS G CODE MODIFIER DAILY ACTIVITY: CH

## 2020-12-18 ASSESSMENT — ACTIVITIES OF DAILY LIVING (ADL): TOILETING: INDEPENDENT

## 2020-12-18 ASSESSMENT — PAIN DESCRIPTION - PAIN TYPE: TYPE: ACUTE PAIN

## 2020-12-18 NOTE — PROGRESS NOTES
Telemetry Shift Summary     Rhythm: SR with BBB. So much ectopy does give the appearance of possible Sinus Arrhythmia.   HR Range 48-80  Ectopy  rare couplets, frequent PVCs, frequent Trigeminy, frequent PACs  Measurements .16/.14/.36              Normal Values  Rhythm SR  HR Range    Measurements 0.12-0.20 / 0.06-0.10  / 0.30-0.52

## 2020-12-18 NOTE — PROGRESS NOTES
[facility] Services Progress Note     Hemodialysis treatment ordered today per Dr. NATALIE Galvin x 3 hours.   Treatment initiated at 0714 ended at 1000. Treatment ended 14 mins. Early per Pt.'s requests.     Patient tolerated tx; see paper flow sheet for details.      Net UF 1020 mL.      Needles removed from access site. Dressings applied and sites held x 10 minutes; verified no bleeding. Positive bruit/thrill post tx. Staff RN to monitor AVF for breakthrough bleeding. Should breakthrough bleeding occur, staff RN to apply pressure to access sites until bleeding resolved. Notify Dialysis and Nephrologist for follow-up.     Report given to Primary RN.

## 2020-12-18 NOTE — PROGRESS NOTES
[facility] Medicine Daily Progress Note    Date of Service  12/18/2020    Chief Complaint  80 y.o. male admitted 12/14/2020 with weakness and penile pain.    Hospital Course  80 y.o. male who presented 12/14/2020 with groin pain with urge to urinate. No obvious cause of it.  No abdominal pain.  Lactic was elevated and so abdominal CT done concerning for acute cholecystitis. AST and ALT slightly elevated but TB is normal Alk phos 122.  RLL infiltrate  Dr. Martines consulted from general surgery unclear if from primary cholecystitis.  An emergent perc cholecystic drain placed  Started on abx, blood cultured and transferred to ICU for further management  Patient improved and has weaned from pressors other than midodrine.  He accidentally pulled out his cholecystotomy drain and this will need to be replaced per general surgery - IR will be doing this.  He has transitioned to oral antibiotics and will complete a 14 day course as recommended.      Interval Problem Update  Patient very anxious today, states he has to go home today.  I educated him that going home today was not appropriate and he needs to have the drain replaced and leave it in for a total of 6 weeks and complete his course of antibiotics.  CT abdomen without contrast ordered this am for planning for placement of his melinda drain.      Consultants/Specialty  [facility] - sx - s/o  [place] - critical care - s/o    Code Status  Full Code    Disposition  Home likely tomorrow following drain placement.    Review of Systems  Review of Systems   Constitutional: Negative for chills and fever.   HENT: Negative for congestion and sore throat.    Eyes: Negative for blurred vision and photophobia.   Respiratory: Negative for cough and shortness of breath.    Cardiovascular: Negative for chest pain, claudication and leg swelling.   Gastrointestinal: Negative for abdominal pain, constipation, diarrhea, heartburn and vomiting.   Genitourinary: Negative for dysuria and  hematuria.   Musculoskeletal: Negative for joint pain and myalgias.   Skin: Negative for itching and rash.   Neurological: Negative for dizziness, sensory change, speech change, weakness and headaches.   Psychiatric/Behavioral: Negative for depression. The patient is not nervous/anxious and does not have insomnia.         Physical Exam  Temp:  [36.4 °C (97.5 °F)-36.6 °C (97.9 °F)] 36.4 °C (97.6 °F)  Pulse:  [] 83  Resp:  [16-18] 18  BP: ()/() 135/101  SpO2:  [92 %-100 %] 92 %    Physical Exam  Vitals signs and nursing note reviewed.   Constitutional:       General: He is not in acute distress.     Appearance: Normal appearance.   HENT:      Head: Normocephalic and atraumatic.   Eyes:      General: No scleral icterus.     Extraocular Movements: Extraocular movements intact.   Neck:      Musculoskeletal: Normal range of motion and neck supple.   Cardiovascular:      Rate and Rhythm: Normal rate and regular rhythm.      Pulses: Normal pulses.      Heart sounds: Normal heart sounds. No murmur.   Pulmonary:      Effort: Pulmonary effort is normal. No respiratory distress.      Breath sounds: Normal breath sounds. No wheezing, rhonchi or rales.   Abdominal:      General: Abdomen is flat. Bowel sounds are normal. There is no distension.      Palpations: Abdomen is soft.      Tenderness: There is no rebound.   Musculoskeletal:         General: No swelling or tenderness.   Lymphadenopathy:      Cervical: No cervical adenopathy.   Skin:     Coloration: Skin is not jaundiced.      Findings: No erythema.   Neurological:      General: No focal deficit present.      Mental Status: He is alert and oriented to person, place, and time. Mental status is at baseline.      Cranial Nerves: No cranial nerve deficit.   Psychiatric:         Mood and Affect: Mood normal.         Behavior: Behavior normal.         Fluids    Intake/Output Summary (Last 24 hours) at 12/18/2020 1524  Last data filed at 12/18/2020 1000  Gross  per 24 hour   Intake 400 ml   Output 1480 ml   Net -1080 ml       Laboratory  Recent Labs     12/16/20  0450 12/17/20  0430   WBC 7.7 5.6   RBC 2.34* 2.40*   HEMOGLOBIN 7.5* 7.6*   HEMATOCRIT 25.9* 26.5*   .7* 110.4*   MCH 32.1 31.7   MCHC 29.0* 28.7*   RDW 76.1* 77.9*   PLATELETCT 181 174   MPV 10.7 10.8     Recent Labs     12/16/20  0720 12/17/20  0430 12/18/20  0507   SODIUM 138 139 138   POTASSIUM 5.0 5.1 5.1   CHLORIDE 96 96 98   CO2 23 27 13*   GLUCOSE 125* 103* 93   BUN 63* 48* 69*   CREATININE 7.42* 5.39* 6.90*   CALCIUM 10.1 10.1 10.1     Recent Labs     12/17/20  0430 12/18/20  1135   INR 1.30* 1.18*               Imaging  CT-DRAIN-CHOLECTYSTOSTOMY   Final Result      1. CT guided cholecystostomy with placement of an 8-[place] locking loop catheter.      2. Before catheter removal a tracto-gram may also be helpful to make sure that the catheter tract has matured so that there is not intraperitoneal bile leakage after catheter removal.      CT-ABDOMEN-PELVIS WITH   Final Result      1.  Normal CT appearance of the perineum with no evidence of herniated gangrene      2.  New small perihepatic, pericholecystic, right anterior pararenal space and pelvic ascites with new moderate gallbladder wall thickening and adjacent fat stranding. This is worrisome for acute cholecystitis although hepatocellular disease can also    cause gallbladder wall thickening      3.  Mildly lobular hepatic contour is indicates cirrhosis, unchanged and there are some hepatic hypodense masses most consistent with cysts      4.  Left adrenal gland thickening is stable and could be from hyperplasia or more likely small masses      5.  Aortic valve calcification compatible with aortic stenosis and there is multichamber cardiac enlargement      6.  Right pleural fluid small estimated volume has decreased from comparison but there is new pleural thickening which could indicate empyema formation or disorganization      7.  Moderate  "colonic diverticulosis without evidence of diverticulitis      8.  Right lower lobe chronic pulmonary consolidation could be from round atelectasis favored over consolidation from aspiration, pneumonia or mass      Findings were discussed with CATINA VALLADARES on 12/14/2020 9:54 AM.      DX-CHEST-FOR LINE PLACEMENT Perform procedure in: Other(comment f6 below):   Final Result      Left IJ line tip overlies the brachiocephalic confluence and no pneumothorax is identified      Stable small right pleural effusion and hazy basilar opacity could be from atelectasis or consolidation      Stable moderate cardiac silhouette enlargement      DX-CHEST-PORTABLE (1 VIEW)   Final Result         1.  Hazy right pulmonary atelectasis versus infiltrates, stable.   2.  Small right pleural effusion, stable   3.  Cardiomegaly   4.  Atherosclerosis      CT-DRAIN-CHOLECTYSTOSTOMY    (Results Pending)   CT-ABDOMEN W/O    (Results Pending)        Assessment/Plan  * Acalculous cholecystitis  Assessment & Plan  Very poor surgical candidate, Dr Martines consulted and recommended medical management and placement of cholecystotomy tube for 6 weeks for decompression.  Tube was placed on 12/14 without complication however, \"fell out\" this am and will need to be replaced.  I've spoken with Dr Banks who will be here tomorrow am to replace it.      Balanitis  Assessment & Plan  Improving with medications  PRN lidocaine cream  Follow up with urology as outpatient      Chronic systolic congestive heart failure (HCC)- (present on admission)  Assessment & Plan  Echo from 12/9 shows EF of 35%  Medical management      Pleural effusion  Assessment & Plan  Likely reactive to the irritation from cholecystitis  Patient on room air       ESRD (end stage renal disease) (HCC)- (present on admission)  Assessment & Plan  On HD Mwf, SNN following  Post HD today.           VTE prophylaxis: heparin - on hold for procedure      "

## 2020-12-18 NOTE — THERAPY
"Occupational Therapy   Initial Evaluation     Patient Name: Parmjit Castelan  Age:  80 y.o., Sex:  male  Medical Record #: [id number]  Today's Date: 12/18/2020       Precautions: Fall Risk  Comments: Dialysis 3 x/week     Assessment  Patient is 80 y.o. male with a diagnosis of groin & pain in his penis with an urge to urinate.  Additional factors influencing patient status / progress: significant medical comorbidities presenting with hypotension, elevated lactic acid, with CT findings demonstrating thickened gallbladder wall as well as right upper quadrant fluid and cirrhosis.  Today pt was seen after dialysis & was doing very well but continued to have penile pain.  Pt currently was able to complete basic ADL's with supervision.  Pt reports he has all necessary AE for home use & his wife will be able to assist as needed.  Pt has no further Acute OT needs      Plan    Recommend Occupational Therapy for Evaluation only.    DC Equipment Recommendations: None  Discharge Recommendations: Recommend home health for continued occupational therapy services     Subjective    \"I'm still having pain in my penis\"     Objective       12/18/20 1328   Prior Living Situation   Prior Services None  (dialysis 3 x/week )   Housing / Facility 1 Story House   Steps Into Home 1   Bathroom Set up Walk In Shower;Shower Chair   Equipment Owned Front-Wheel Walker;Single Point Cane;Tub / Shower Seat   Lives with - Patient's Self Care Capacity Spouse   Comments pt reports his spouse can assist if needed upon D/C   Cognition    Comments pleasant & co-operative   Balance Assessment   Sitting Balance (Static) Good   Sitting Balance (Dynamic) Good   Standing Balance (Static) Good   Standing Balance (Dynamic) Good   Weight Shift Sitting Good   Weight Shift Standing Good   ADL Assessment   Eating Modified Independent   Grooming Supervision;Standing   Upper Body Dressing Supervision   Lower Body Dressing Supervision   Toileting Supervision   Functional " Mobility   Sit to Stand Supervised   Bed, Chair, Wheelchair Transfer Supervised   Toilet Transfers Supervised   Mobility pt abl eto amb intohallwith FWW & supervision

## 2020-12-18 NOTE — HOSPITAL COURSE
80 y.o. male who presented 12/14/2020 with groin pain with urge to urinate. No obvious cause of it.  No abdominal pain.  Lactic was elevated and so abdominal CT done concerning for acute cholecystitis. AST and ALT slightly elevated but TB is normal Alk phos 122.  RLL infiltrate  Dr. Martines consulted from general surgery unclear if from primary cholecystitis.  An emergent perc cholecystic drain placed  Started on abx, blood cultured and transferred to ICU for further management  Patient improved and has weaned from pressors other than midodrine.  He accidentally pulled out his cholecystotomy drain and this will need to be replaced per general surgery - IR will be doing this.  He has transitioned to oral antibiotics and will complete a 14 day course as recommended.

## 2020-12-18 NOTE — PROGRESS NOTES
Received shift handoff report from Nancy BYRD. Pt sitting bedside in cardiac chair. A&Ox4, call light in reach, whiteboard updated. POC discussed and pt verbalizes understanding.

## 2020-12-18 NOTE — THERAPY
Physical Therapy Contact Note    Patient Name: Parmjit Castelan  Age:  80 y.o., Sex:  male  Medical Record #: [id number]  Today's Date: 12/18/2020    Acknowledge PT order, however pt involved in dialysis at time of PT arrival. Will f/u another time    Peg Mata, PT

## 2020-12-18 NOTE — ASSESSMENT & PLAN NOTE
"Very poor surgical candidate, Dr Martines consulted and recommended medical management and placement of cholecystotomy tube for 6 weeks for decompression.  Tube was placed on 12/14 without complication however, \"fell out\" this am and will need to be replaced.  I've spoken with Dr Banks who will be here tomorrow am to replace it.    "

## 2020-12-18 NOTE — PROGRESS NOTES
Telemetry Shift Summary    Rhythm SR  HR Range 64  Ectopy fPAC, oPVC  Measurements /0.14/0.42        Normal Values  Rhythm SR  HR Range    Measurements 0.12-0.20 / 0.06-0.10  / 0.30-0.52

## 2020-12-18 NOTE — RESPIRATORY CARE
COPD EDUCATION by COPD CLINICAL EDUCATOR  12/18/2020 at 2:12 PM by Nicole Beach, RRT     Patient interviewed by COPD education team. Patient refused COPD program at this time. Patient was seen on 12/10/20 and given information on COPD pt stable with breathing did not have any question or want any assistance.

## 2020-12-18 NOTE — PROGRESS NOTES
Paged Dr. Birch regarding patient's YAIMA drain coming out. Per Dr. Birch she will contact Dr. Martines about the situation.

## 2020-12-18 NOTE — PROGRESS NOTES
[facility] Nephrology Progress Note  Date of Service  12/18/2020    Chief Complaint  81 yo M PMH ESRD MWF iHD, HTN, anemia of CKD, and CKD-MBD who presents to ED with CC as above.He normally dialyzes at [facility]. Nephrology was asked to consult for ESRD, HD management and maintenance respiectively.   Patient described pain since Friday, Sharp, burning, constant pain.  Unable to sleep this weekend.  No abdominal pain or back pain.  No fever chills.  No nausea or vomiting.  No history for similar symptoms.  In ED patient was found to have 100 ml in bladder by bladder scan, lactate resulted as 9.7 therefore patient was started on broad- spectrum AB. Thereafter patient dropped BP, received IV fluids w/o improvement, started on levophed. CT abdomen showed Acute cholecystitis, surgery was consulted for urgent percutaneous cholecystotomy. COVID test (-).         Daily Nephrology Summary:  12/14: consult done, HD performed  12/15: in ICU on pressors, sitting up in bed, feeling okay, tolerated HD yesterday with UF: 2.0L, hesitant about restarting Midodrine as he feels this is what caused his problem with gall bladder   12/16: sitting up in bed, arguing with staff about getting HD in chair or bed, compromised and pt is sitting up in bed for HD, reports pain is improved some in abdomen and penis, still on levo   12/17: sitting up in chair, off levophed, mild hypotension, tolerated HD yesterday with UF: 1.0L  12/18: Annette drain fell out, Ct scan again today  Review of Systems  Review of Systems   Constitutional: Negative.    HENT: Positive for hearing loss.    Eyes: Negative.    Respiratory: Negative.    Cardiovascular: Positive for leg swelling.   Gastrointestinal: Positive for abdominal pain.   Genitourinary: Negative.    Musculoskeletal: Positive for myalgias.   Skin: Negative.    Endo/Heme/Allergies: Bruises/bleeds easily.        Physical Exam  Temp:  [36.4 °C (97.5 °F)-36.6 °C (97.9 °F)] 36.4 °C (97.6  °F)  Pulse:  [] 83  Resp:  [16-20] 18  BP: ()/() 135/101  SpO2:  [92 %-100 %] 92 %    Physical Exam  Vitals signs and nursing note reviewed.   Constitutional:       Appearance: Normal appearance.   Eyes:      Extraocular Movements: Extraocular movements intact.      Pupils: Pupils are equal, round, and reactive to light.   Neck:      Musculoskeletal: Normal range of motion and neck supple.   Cardiovascular:      Rate and Rhythm: Normal rate and regular rhythm.   Pulmonary:      Effort: Respiratory distress present.   Abdominal:      General: There is distension.   Musculoskeletal:         General: Swelling present.   Neurological:      Mental Status: He is alert.         Fluids    Intake/Output Summary (Last 24 hours) at 12/18/2020 1336  Last data filed at 12/18/2020 1000  Gross per 24 hour   Intake 400 ml   Output 1480 ml   Net -1080 ml       Laboratory  Recent Labs     12/16/20  0450 12/17/20  0430   WBC 7.7 5.6   RBC 2.34* 2.40*   HEMOGLOBIN 7.5* 7.6*   HEMATOCRIT 25.9* 26.5*   .7* 110.4*   MCH 32.1 31.7   MCHC 29.0* 28.7*   RDW 76.1* 77.9*   PLATELETCT 181 174   MPV 10.7 10.8     Recent Labs     12/16/20  0720 12/17/20  0430 12/18/20  0507   SODIUM 138 139 138   POTASSIUM 5.0 5.1 5.1   CHLORIDE 96 96 98   CO2 23 27 13*   GLUCOSE 125* 103* 93   BUN 63* 48* 69*   CREATININE 7.42* 5.39* 6.90*   CALCIUM 10.1 10.1 10.1     Recent Labs     12/17/20  0430 12/18/20  1135   INR 1.30* 1.18*     No results for input(s): NTPROBNP in the last 72 hours.        Imaging  # ESRD               Etiology likely 2/2 HTN               MWF iHD at [facility]   # septic shock 2/2 cholecystitis                per surgery and ICU team   #hypotension               on midodrine  # Anemia of CKD  #Chronic systolic HF  # HLD  # CAD  # Elevated LFTs, trending up     PLAN:  - HD today  - CT scan ? Replace Annette drain  - HD q MWF and prn   - UF as tolerated  - strict IO  - fluid restriction 1L   - No dietary protein  restrictions  - Dose all meds per ESRD  - follow Blood cultures   - antibiotics per primary team       Assessment/Plan  No new Assessment & Plan notes have been filed under this hospital service since the last note was generated.  Service: Nephrology

## 2020-12-18 NOTE — PROGRESS NOTES
During morning med pass patient's YAIMA drain was found to have come out.     0549 Spoke to Dr. Doran regarding patient's YAIMA drain coming out. Based on patient's output per Dr. Doran speak to daytime surgeon Dr. Martines about having YAIMA drain replaced.

## 2020-12-18 NOTE — PROGRESS NOTES
Assessment completed, patient refused VSS. Patient A&Ox4. Fine crackles RLL. Bilateral LE edema 3+ RLE and 2+LLE. SBA to the bathroom with FWW patient is steady. 60mL drained out of YAIMA drain. Patient questions and concerns answered.

## 2020-12-18 NOTE — PROGRESS NOTES
Updated Dr. oDran of patient's MAP being 57. Patient has morning midodrine scheduled 0730, per Dr. Doran okay to give midodrine before the one hour window of the scheduled time.

## 2020-12-19 ENCOUNTER — APPOINTMENT (OUTPATIENT)
Dept: RADIOLOGY | Facility: MEDICAL CENTER | Age: 80
DRG: 871 | End: 2020-12-19
Attending: INTERNAL MEDICINE
Payer: MEDICARE

## 2020-12-19 VITALS
BODY MASS INDEX: 31.56 KG/M2 | DIASTOLIC BLOOD PRESSURE: 54 MMHG | HEART RATE: 108 BPM | SYSTOLIC BLOOD PRESSURE: 117 MMHG | TEMPERATURE: 97.2 F | OXYGEN SATURATION: 92 % | HEIGHT: 67 IN | WEIGHT: 201.06 LBS | RESPIRATION RATE: 20 BRPM

## 2020-12-19 LAB
ANION GAP SERPL CALC-SCNC: 21 MMOL/L (ref 7–16)
ANISOCYTOSIS BLD QL SMEAR: ABNORMAL
APPEARANCE FLD: CLEAR
BACTERIA BLD CULT: NORMAL
BACTERIA BLD CULT: NORMAL
BASOPHILS # BLD AUTO: 0.1 % (ref 0–1.8)
BASOPHILS # BLD: 0.01 K/UL (ref 0–0.12)
BODY FLD TYPE: NORMAL
BUN SERPL-MCNC: 45 MG/DL (ref 8–22)
CALCIUM SERPL-MCNC: 10.3 MG/DL (ref 8.4–10.2)
CHLORIDE SERPL-SCNC: 96 MMOL/L (ref 96–112)
CO2 SERPL-SCNC: 23 MMOL/L (ref 20–33)
COLOR FLD: NORMAL
COMMENT 1642: NORMAL
CREAT SERPL-MCNC: 5.61 MG/DL (ref 0.5–1.4)
EOSINOPHIL # BLD AUTO: 0.01 K/UL (ref 0–0.51)
EOSINOPHIL NFR BLD: 0.1 % (ref 0–6.9)
ERYTHROCYTE [DISTWIDTH] IN BLOOD BY AUTOMATED COUNT: 78.5 FL (ref 35.9–50)
GLUCOSE SERPL-MCNC: 109 MG/DL (ref 65–99)
HCT VFR BLD AUTO: 33.4 % (ref 42–52)
HGB BLD-MCNC: 9.8 G/DL (ref 14–18)
HYPOCHROMIA BLD QL SMEAR: ABNORMAL
IMM GRANULOCYTES # BLD AUTO: 0.04 K/UL (ref 0–0.11)
IMM GRANULOCYTES NFR BLD AUTO: 0.4 % (ref 0–0.9)
INR PPP: 1.21 (ref 0.87–1.13)
LYMPHOCYTES # BLD AUTO: 0.35 K/UL (ref 1–4.8)
LYMPHOCYTES NFR BLD: 3.5 % (ref 22–41)
MCH RBC QN AUTO: 31.8 PG (ref 27–33)
MCHC RBC AUTO-ENTMCNC: 29.3 G/DL (ref 33.7–35.3)
MCV RBC AUTO: 108.4 FL (ref 81.4–97.8)
MICROCYTES BLD QL SMEAR: ABNORMAL
MONOCYTES # BLD AUTO: 0.5 K/UL (ref 0–0.85)
MONOCYTES NFR BLD AUTO: 5 % (ref 0–13.4)
NEUTROPHILS # BLD AUTO: 9.18 K/UL (ref 1.82–7.42)
NEUTROPHILS NFR BLD: 90.9 % (ref 44–72)
NRBC # BLD AUTO: 0.09 K/UL
NRBC BLD-RTO: 0.9 /100 WBC
OVALOCYTES BLD QL SMEAR: NORMAL
PLATELET # BLD AUTO: 261 K/UL (ref 164–446)
PLATELET BLD QL SMEAR: NORMAL
PMV BLD AUTO: 10 FL (ref 9–12.9)
POIKILOCYTOSIS BLD QL SMEAR: NORMAL
POLYCHROMASIA BLD QL SMEAR: NORMAL
POTASSIUM SERPL-SCNC: 4.8 MMOL/L (ref 3.6–5.5)
PROTHROMBIN TIME: 15 SEC (ref 12–14.6)
RBC # BLD AUTO: 3.08 M/UL (ref 4.7–6.1)
RBC # FLD: 2 CELLS/UL
RBC BLD AUTO: PRESENT
SIGNIFICANT IND 70042: NORMAL
SIGNIFICANT IND 70042: NORMAL
SITE SITE: NORMAL
SITE SITE: NORMAL
SODIUM SERPL-SCNC: 140 MMOL/L (ref 135–145)
SOURCE SOURCE: NORMAL
SOURCE SOURCE: NORMAL
WBC # BLD AUTO: 10.1 K/UL (ref 4.8–10.8)
WBC # FLD: <1 CELLS/UL

## 2020-12-19 PROCEDURE — A9270 NON-COVERED ITEM OR SERVICE: HCPCS | Performed by: INTERNAL MEDICINE

## 2020-12-19 PROCEDURE — 87070 CULTURE OTHR SPECIMN AEROBIC: CPT

## 2020-12-19 PROCEDURE — 80048 BASIC METABOLIC PNL TOTAL CA: CPT

## 2020-12-19 PROCEDURE — 700111 HCHG RX REV CODE 636 W/ 250 OVERRIDE (IP): Performed by: HOSPITALIST

## 2020-12-19 PROCEDURE — 99153 MOD SED SAME PHYS/QHP EA: CPT

## 2020-12-19 PROCEDURE — 700102 HCHG RX REV CODE 250 W/ 637 OVERRIDE(OP): Performed by: INTERNAL MEDICINE

## 2020-12-19 PROCEDURE — 89051 BODY FLUID CELL COUNT: CPT

## 2020-12-19 PROCEDURE — 47490 INCISION OF GALLBLADDER: CPT

## 2020-12-19 PROCEDURE — 700105 HCHG RX REV CODE 258: Performed by: RADIOLOGY

## 2020-12-19 PROCEDURE — 87205 SMEAR GRAM STAIN: CPT

## 2020-12-19 PROCEDURE — 0F9430Z DRAINAGE OF GALLBLADDER WITH DRAINAGE DEVICE, PERCUTANEOUS APPROACH: ICD-10-PCS | Performed by: RADIOLOGY

## 2020-12-19 PROCEDURE — 700111 HCHG RX REV CODE 636 W/ 250 OVERRIDE (IP)

## 2020-12-19 PROCEDURE — 85610 PROTHROMBIN TIME: CPT

## 2020-12-19 PROCEDURE — 99239 HOSP IP/OBS DSCHRG MGMT >30: CPT | Performed by: INTERNAL MEDICINE

## 2020-12-19 PROCEDURE — 85025 COMPLETE CBC W/AUTO DIFF WBC: CPT

## 2020-12-19 RX ORDER — AMOXICILLIN AND CLAVULANATE POTASSIUM 500; 125 MG/1; MG/1
1 TABLET, FILM COATED ORAL DAILY
Qty: 30 TAB | Refills: 0 | Status: SHIPPED | OUTPATIENT
Start: 2020-12-20

## 2020-12-19 RX ORDER — MIDAZOLAM HYDROCHLORIDE 1 MG/ML
INJECTION INTRAMUSCULAR; INTRAVENOUS
Status: COMPLETED
Start: 2020-12-19 | End: 2020-12-19

## 2020-12-19 RX ORDER — SODIUM CHLORIDE 9 MG/ML
500 INJECTION, SOLUTION INTRAVENOUS
Status: ACTIVE | OUTPATIENT
Start: 2020-12-19 | End: 2020-12-19

## 2020-12-19 RX ORDER — FLUDROCORTISONE ACETATE 0.1 MG/1
0.2 TABLET ORAL EVERY MORNING
Qty: 30 TAB | Refills: 1 | Status: SHIPPED | OUTPATIENT
Start: 2020-12-20

## 2020-12-19 RX ORDER — MIDAZOLAM HYDROCHLORIDE 1 MG/ML
.5-2 INJECTION INTRAMUSCULAR; INTRAVENOUS PRN
Status: ACTIVE | OUTPATIENT
Start: 2020-12-19 | End: 2020-12-19

## 2020-12-19 RX ORDER — ONDANSETRON 2 MG/ML
4 INJECTION INTRAMUSCULAR; INTRAVENOUS PRN
Status: ACTIVE | OUTPATIENT
Start: 2020-12-19 | End: 2020-12-19

## 2020-12-19 RX ADMIN — MIDODRINE HYDROCHLORIDE 10 MG: 5 TABLET ORAL at 12:17

## 2020-12-19 RX ADMIN — MIDAZOLAM HYDROCHLORIDE 2 MG: 1 INJECTION INTRAMUSCULAR; INTRAVENOUS at 07:30

## 2020-12-19 RX ADMIN — SODIUM CHLORIDE 500 ML: 9 INJECTION, SOLUTION INTRAVENOUS at 07:42

## 2020-12-19 RX ADMIN — AMOXICILLIN AND CLAVULANATE POTASSIUM 1 TABLET: 500; 125 TABLET, FILM COATED ORAL at 05:53

## 2020-12-19 RX ADMIN — FLUDROCORTISONE ACETATE 0.2 MG: 0.1 TABLET ORAL at 05:53

## 2020-12-19 RX ADMIN — FENTANYL CITRATE 100 MCG: 50 INJECTION, SOLUTION INTRAMUSCULAR; INTRAVENOUS at 07:30

## 2020-12-19 RX ADMIN — MORPHINE SULFATE 1 MG: 4 INJECTION INTRAVENOUS at 06:37

## 2020-12-19 RX ADMIN — MORPHINE SULFATE 1 MG: 4 INJECTION INTRAVENOUS at 03:40

## 2020-12-19 RX ADMIN — MICONAZOLE NITRATE: 20 CREAM TOPICAL at 06:06

## 2020-12-19 RX ADMIN — MIDAZOLAM HYDROCHLORIDE 2 MG: 1 INJECTION, SOLUTION INTRAMUSCULAR; INTRAVENOUS at 07:30

## 2020-12-19 RX ADMIN — MIDODRINE HYDROCHLORIDE 10 MG: 5 TABLET ORAL at 09:23

## 2020-12-19 ASSESSMENT — PAIN DESCRIPTION - PAIN TYPE
TYPE: ACUTE PAIN
TYPE: ACUTE PAIN

## 2020-12-19 NOTE — OR SURGEON
Immediate Post- Operative Note        PostOp Diagnosis: cholecystitis, tube pulled out      Procedure(s): CT melinda tube      Estimated Blood Loss: Less than 5 ml        Complications: None            12/19/2020     8:03 AM     Solis Banks M.D.

## 2020-12-19 NOTE — DOCUMENTATION QUERY
[facility]                                                                       Query Response Note      PATIENT:               EDVIN GREEN  ACCT #:                  [id number]  MRN:                     [id number]  :                      1940  ADMIT DATE:       2020 5:44 AM  DISCH DATE:          RESPONDING  PROVIDER #:        [id number]           QUERY TEXT:      Sepsis and hypotension is documented in the Medical Record.  Please further specify this documentation.    NOTE:  If an appropriate response is not listed below, please respond with a new note.        The patient's Clinical Indicators include:  - Findings: 1. H&P:  Unable to give dx of sepsis as only one SIRS criteria met which was tachypnea                     2. surgery consult note:   There is concern that his gallbladder could be the cause of his current sepsis.                    3. ED provider note: septic shock                    4. nephrology note:  septic shock 2/2 cholecystitis                    BP 59/34 - 128/57, HR 26 - 94, RR 11 - 75 T 96.7 - 98.0    WBC 7.7 - 11.1, lactic acid 1.4 - 8.1   - Treatments:  pressors, ICU admission, surgical consult, nephrology consult   - Risk factors:  cholecystitis, hypotension, heart failure, renal failure  Options provided:   -- Sepsis with hypotension without septic shock POA   -- Sepsis with septic shock POA   -- Sepsis with hypotension without septic shock developed after admission   -- Sepsis with septic shock developed after admission   -- Sepsis is ruled out, however, shock is ruled in, please specify type of shock   -- Sepsis and shock are ruled out   -- Unable to determine      Query created by: Mary Calix on 2020 7:07 AM    RESPONSE TEXT:    Unable to determine          Electronically signed by:  VENUS JOE MD 2020 4:17 PM

## 2020-12-19 NOTE — DISCHARGE INSTRUCTIONS
"Discharge Instructions    Discharged to home by car with relative. Discharged via wheelchair, hospital escort: Yes.  Special equipment needed: Not Applicable    Be sure to schedule a follow-up appointment with your primary care doctor or any specialists as instructed.     Discharge Plan:        I understand that a diet low in cholesterol, fat, and sodium is recommended for good health. Unless I have been given specific instructions below for another diet, I accept this instruction as my diet prescription.   Other diet: renal    Special Instructions: None    · Is patient discharged on Warfarin / Coumadin?   No       Cholecystitis    Cholecystitis is irritation and swelling (inflammation) of the gallbladder. The gallbladder is an organ that is shaped like a pear. It is under the liver on the right side of the body. This organ stores bile. Bile helps the body break down (digest) the fats in food.  This condition can occur all of a sudden. It needs to be treated.  What are the causes?  This condition may be caused by stones or lumps that form in the gallbladder (gallstones). Gallstones can block the tube (duct) that carries bile out of your gallbladder.  Other causes are:  · Damage to the gallbladder due to less blood flow.  · Germs in the bile ducts.  · Scars or kinks in the bile ducts.  · Abnormal growths (tumors) in the liver, pancreas, or gallbladder.  What increases the risk?  You are more likely to develop this condition if:  · You have sickle cell disease.  · You take birth control pills.   · You use estrogen.  · You have alcoholic liver disease.  · You have liver cirrhosis.  · You are being fed through a vein.  · You are very ill.  · You do not eat or drink for a long time. This is also called \"fasting.\"  · You are overweight (obese).  · You lose weight too fast.  · You are pregnant.  · You have high levels of fat in the blood (triglycerides).  · You have irritation and swelling of the pancreas " (pancreatitis).  What are the signs or symptoms?  Symptoms of this condition include:  · Pain in the belly (abdomen). Pain is often in the upper right area of the belly.  · Tenderness or bloating in the belly.  · Feeling sick to your stomach (nauseous).  · Throwing up (vomiting).  · Fever.  · Chills.  How is this diagnosed?  This condition may be diagnosed with a medical history and exam. You may also have other tests, such as:  · Imaging tests. This may include:  ? Ultrasound.  ? CT scan of the belly.  ? Nuclear scan. This is also called a HIDA scan. This scan lets your doctor see the bile as it moves in your body.  ? MRI.  · Blood tests. These are done to check:  ? Your blood count. The white blood cell count may be higher than normal.  ? How well your liver works.  How is this treated?  This condition may be treated with:  · Surgery to take out your gallbladder.  · Antibiotic medicines to treat illnesses caused by germs.  · Going without food for some time.  · Giving fluids through an IV tube.  · Medicines to treat pain or throwing up.  Follow these instructions at home:  · If you had surgery, follow instructions from your doctor about how to care for yourself after you go home.  Medicines    · Take over-the-counter and prescription medicines only as told by your doctor.  · If you were prescribed an antibiotic medicine, take it as told by your doctor. Do not stop taking it even if you start to feel better.  General instructions  · Follow instructions from your doctor about what to eat or drink. Do not eat or drink anything that makes you sick again.  · Do not lift anything that is heavier than 10 lb (4.5 kg) until your doctor says that it is safe.  · Do not use any products that contain nicotine or tobacco, such as cigarettes and e-cigarettes. If you need help quitting, ask your doctor.  · Keep all follow-up visits as told by your doctor. This is important.  Contact a doctor if:  · You have pain and your medicine  does not help.  · You have a fever.  Get help right away if:  · Your pain moves to:  ? Another part of your belly.  ? Your back.  · Your symptoms do not go away.  · You have new symptoms.  Summary  · Cholecystitis is swelling and irritation of the gallbladder.  · This condition may be caused by stones or lumps that form in the gallbladder (gallstones).  · Common symptoms are pain in the belly. You may feel sick to your stomach and start throwing up. You may also have a fever and chills.  · This condition may be treated with surgery to take out the gallbladder. It may also be treated with medicines, fasting, and fluids through an IV tube.  · Follow what you are told about eating and drinking. Do not eat things that make you sick again.  This information is not intended to replace advice given to you by your health care provider. Make sure you discuss any questions you have with your health care provider.  Document Released: 12/06/2012 Document Revised: 04/26/2019 Document Reviewed: 04/26/2019  [employer] Patient Education © 2020 [employer]      Depression / Suicide Risk    As you are discharged from this [facility] facility, it is important to learn how to keep safe from harming yourself.    Recognize the warning signs:  · Abrupt changes in personality, positive or negative- including increase in energy   · Giving away possessions  · Change in eating patterns- significant weight changes-  positive or negative  · Change in sleeping patterns- unable to sleep or sleeping all the time   · Unwillingness or inability to communicate  · Depression  · Unusual sadness, discouragement and loneliness  · Talk of wanting to die  · Neglect of personal appearance   · Rebelliousness- reckless behavior  · Withdrawal from people/activities they love  · Confusion- inability to concentrate     If you or a loved one observes any of these behaviors or has concerns about self-harm, here's what you can do:  · Talk about it- your feelings  and reasons for harming yourself  · Remove any means that you might use to hurt yourself (examples: pills, rope, extension cords, firearm)  · Get professional help from the community (Mental Health, Substance Abuse, psychological counseling)  · Do not be alone:Call your Safe Contact- someone whom you trust who will be there for you.  · Call your local CRISIS HOTLINE 944-9435 or 650-682-0243  · Call your local Children's Mobile Crisis Response Team [place] (653) 382-8996 or www.[employer]  · Call the toll free National Suicide Prevention Hotlines   · National Suicide Prevention Lifeline 268-496-ERJU (3007)  · National Hope Line Network 800-SUICIDE (822-3597)

## 2020-12-19 NOTE — PROGRESS NOTES
[facility] -  PROGRESS NOTE               Author: Mary Jo Beatty M.D. Date & Time: 12/19/2020  8:53 AM     HPI:  81 yo M PMH ESRD MWF iHD, HTN, anemia of CKD, and CKD-MBD who presents to ED with CC as above.He normally dialyzes at [facility]. Nephrology was asked to consult for ESRD, HD management and maintenance respiectively.   Patient described pain since Friday, Sharp, burning, constant pain.  Unable to sleep this weekend.  No abdominal pain or back pain.  No fever chills.  No nausea or vomiting.  No history for similar symptoms.  In ED patient was found to have 100 ml in bladder by bladder scan, lactate resulted as 9.7 therefore patient was started on broad- spectrum AB. Thereafter patient dropped BP, received IV fluids w/o improvement, started on levophed. CT abdomen showed Acute cholecystitis, surgery was consulted for urgent percutaneous cholecystotomy. COVID test (-).         Daily Nephrology Summary:  12/14: consult done, HD performed  12/15: in ICU on pressors, sitting up in bed, feeling okay, tolerated HD yesterday with UF: 2.0L, hesitant about restarting Midodrine as he feels this is what caused his problem with gall bladder   12/16: sitting up in bed, arguing with staff about getting HD in chair or bed, compromised and pt is sitting up in bed for HD, reports pain is improved some in abdomen and penis, still on levo   12/17: sitting up in chair, off levophed, mild hypotension, tolerated HD yesterday with UF: 1.0L  12/18: Annette drain fell out, Ct scan again today.  12/19: CT abdomen: Interval removal of gallbladder drain. No residual fluid collection in the gallbladder and now appears normal. Small right pleural effusion and mild atelectasis.Multiple incidental simple hepatic cyst. Kidney is small in size with simple cyst consistent with sequela of chronic process/chronic renal failure. Aortic and branch vessel atherosclerotic plaque      PAST FAMILY HISTORY:  "Reviewed and Unchanged  SOCIAL HISTORY: Reviewed and Unchanged  CURRENT MEDICATIONS: Reviewed  IMAGING STUDIES: Reviewed    ROS  Constitutional: Negative.    HENT: Positive for hearing loss.    Eyes: Negative.    Respiratory: Negative.    Cardiovascular: Positive for leg swelling.   Gastrointestinal: Positive for abdominal pain.   Genitourinary: Negative.    Musculoskeletal: Positive for myalgias.   Skin: Negative.    Endo/Heme/Allergies: Bruises/bleeds easily.     PHYSICAL EXAM  VS:  BP (!) 95/82   Pulse (!) 102   Temp 36.6 °C (97.8 °F) (Oral)   Resp 20   Ht 1.702 m (5' 7\")   Wt 91.2 kg (201 lb 1 oz)   SpO2 99%   BMI 31.49 kg/m²   GENERAL: no acute distress  NEURO: AOx3  CV: RRR, no JVD  RESP: CTAB  GI: Soft, NT, ND, BS + in 4q  Ext: + edema   SKIN: No concerning rashes  PSYCH: Cooperative    Fluids:  In: 400 [Dialysis:400]  Out: 1420     LABS:  Recent Results (from the past 24 hour(s))   Prothrombin Time    Collection Time: 12/18/20 11:35 AM   Result Value Ref Range    PT 14.7 (H) 12.0 - 14.6 sec    INR 1.18 (H) 0.87 - 1.13   Prothrombin Time    Collection Time: 12/19/20  6:22 AM   Result Value Ref Range    PT 15.0 (H) 12.0 - 14.6 sec    INR 1.21 (H) 0.87 - 1.13   Basic Metabolic Panel    Collection Time: 12/19/20  6:22 AM   Result Value Ref Range    Sodium 140 135 - 145 mmol/L    Potassium 4.8 3.6 - 5.5 mmol/L    Chloride 96 96 - 112 mmol/L    Co2 23 20 - 33 mmol/L    Glucose 109 (H) 65 - 99 mg/dL    Bun 45 (H) 8 - 22 mg/dL    Creatinine 5.61 (HH) 0.50 - 1.40 mg/dL    Calcium 10.3 (H) 8.4 - 10.2 mg/dL    Anion Gap 21.0 (H) 7.0 - 16.0   ESTIMATED GFR    Collection Time: 12/19/20  6:22 AM   Result Value Ref Range    GFR If  12 (A) >60 mL/min/1.73 m 2    GFR If Non African American 10 (A) >60 mL/min/1.73 m 2       (click the triangle to expand results)      ASSESSMENT:  # ESRD               Etiology likely 2/2 HTN               MWF iHD at [facility]  # septic shock 2/2 cholecystitis "                per surgery and ICU team   #hypotension               on midodrine  # Anemia of CKD  #Chronic systolic HF  # HLD  # CAD  # Elevated LFTs, trending up     PLAN:  - HD q MWF and prn. Next HD is on Monday   - UF as tolerated  - strict IO  - fluid restriction 1L   - No dietary protein restrictions  - Dose all meds per ESRD  - follow Blood cultures   - antibiotics per primary team     Ok to transition to outpatient HD once cleared by primary team.    Mary Jo Beatty M.D.

## 2020-12-20 LAB
GRAM STN SPEC: NORMAL
SIGNIFICANT IND 70042: NORMAL
SITE SITE: NORMAL
SOURCE SOURCE: NORMAL

## 2020-12-22 ENCOUNTER — APPOINTMENT (OUTPATIENT)
Dept: SLEEP MEDICINE | Facility: MEDICAL CENTER | Age: 80
End: 2020-12-22
Payer: MEDICARE

## 2020-12-22 LAB
BACTERIA WND AEROBE CULT: NORMAL
GRAM STN SPEC: NORMAL
SIGNIFICANT IND 70042: NORMAL
SITE SITE: NORMAL
SOURCE SOURCE: NORMAL

## 2020-12-29 ENCOUNTER — TELEPHONE (OUTPATIENT)
Dept: MEDICAL GROUP | Facility: MEDICAL CENTER | Age: 80
End: 2020-12-29

## 2020-12-29 NOTE — TELEPHONE ENCOUNTER
I did update his death certificate already this morning.  I called the medical examiner just now, and discussed that I updated at this morning.  He is suspicious that it would likely get rejected again, and did review acceptable causes of death with me.  I will continue to check for this to see if it returns if it does I will update it.

## 2020-12-29 NOTE — TELEPHONE ENCOUNTER
Phone Number Called: [phone]    Call outcome: spoke with an advocate from the Medical examiner      Message: he stated that the cause of death will not be accept. He suggested using congestive heart failure due to hypertension or cronic kidney failure due to hypertension.

## 2020-12-29 NOTE — DOCUMENTATION QUERY
[facility]                                                                       Query Response Note      PATIENT:               EDVIN GREEN  ACCT #:                  [id number]  MRN:                     [id number]  :                      1940  ADMIT DATE:       2020 5:44 AM  DISCH DATE:        2020 12:52 PM  RESPONDING  PROVIDER #:        [id number]           QUERY TEXT:    Sepsis is documented in the Medical Record. Please clarify whether:    NOTE:  If an appropriate response is not listed below, please respond with a new note.      MARIAM [email or web address]    The patient's Clinical Indicators include:  H&P  Date of Service:  2020   Lactic acidosis  A/P  Due to hypotension and started on levophed  Unable to give dx of sepsis as only one SIRS criteria met which was tachypnea  Fluids given and lactic titrated  Blood cx and gall bladder cx      Consults  Date of Service:  2020   A/P  There is concern that his gallbladder could be the cause of his current sepsis.  An emergent percutaneous cholecystostomy has been arranged.  The patient is currently being treated with IV ceftriaxone and Flagyl and vancomycin.  He will be admitted to the ICU for supportive management      Discharge Summary 2020  DISCHARGE DIAGNOSES  Resolved Problems:    Septic shock (HCC) POA: Yes    Lactic acidosis POA: Yes  Options provided:   -- Sepsis present on admission   -- Sepsis developed after admission   -- Sepsis has been ruled out   -- Unable to determine      Query created by: Don Raman on 2020 11:53 AM    RESPONSE TEXT:    Sepsis present on admission          Electronically signed by:  PAULETTE HALL DO 2020 9:46 AM

## 2020-12-30 ENCOUNTER — TELEPHONE (OUTPATIENT)
Dept: MEDICAL GROUP | Facility: MEDICAL CENTER | Age: 80
End: 2020-12-30

## 2021-01-11 DIAGNOSIS — Z23 NEED FOR VACCINATION: ICD-10-CM

## 2021-04-20 ENCOUNTER — APPOINTMENT (OUTPATIENT)
Dept: MEDICAL GROUP | Facility: MEDICAL CENTER | Age: 81
End: 2021-04-20
Payer: MEDICARE

## 2022-02-25 NOTE — PROGRESS NOTES
Monitor summary: SR-ST , AK 0.22, QRS 0.14, QT 0.44, with rare PVCs per strip from monitor room.     Rotation Flap Text: The defect edges were debeveled with a #15 scalpel blade.  Given the location of the defect, shape of the defect and the proximity to free margins a rotation flap was deemed most appropriate.  Using a sterile surgical marker, an appropriate rotation flap was drawn incorporating the defect and placing the expected incisions within the relaxed skin tension lines where possible.    The area thus outlined was incised deep to adipose tissue with a #15 scalpel blade.  The skin margins were undermined to an appropriate distance in all directions utilizing iris scissors.

## 2022-03-03 NOTE — TELEPHONE ENCOUNTER
----- Message from Romain Euceda sent at 2/22/2017  9:59 AM PST -----  Regarding: Pt calling for lab slip  FK/Kate    Pt is calling for lab slip to be mailed to home address below, in preparation for upcoming appt 3/16. Any questions he can be reached at [phone]. [id number]   [place] [address]     Fall with Harm Risk

## 2022-03-07 NOTE — TELEPHONE ENCOUNTER
Reba Sargent, R.N.   Phone Number: [phone]             ED/nishant     Pt calling to report drug interaction with coumadin and amiodarone.   Pt also wants to know when any upcoming procedures are being scheduled.  Pt on way to dialysis and asking you to call him after 11:30am when he will be able to take the call, [phone]      Will call after 1130.    Continue mechanical ventilation increasing FiO2 requirements precludes weaning  Continue to monitor oxygen saturation.  Repeat CXR shows worsening infiltrates despite albumin/lasix (LD 3/2)  Lasix d/c on 3/4

## 2023-04-20 NOTE — TELEPHONE ENCOUNTER
Contacted pt. To give results, recommendations.   Patient presented today for immunization of 3rd hep b. All questions and concerns were answered. Patient discharged with no contraindications.

## 2024-04-19 NOTE — PROGRESS NOTES
"Chief Complaint   Patient presents with   • Atrial Flutter       Subjective:   Ronny Castelan is a 79 y.o. male who presents today for follow-up evaluation of his severe aortic stenosis.  He is also had difficulty with cardiac tamponade in the past.  He did see [facility] cardiology and is being considered for TAVR.   His ejection fraction in June was 30% on JUSTIN.  However, during his recent admission it was 55 to 65%.  He was discharged on amiodarone therapy and warfarin anticoagulation.  He is now post a flutter ablation.    He has no dyspnea on exertion but is limited by orthopedic problems.  He has had no PND, orthopnea or edema.  He denies any chest discomfort, palpitations or lightheadedness.    Past Medical History:   Diagnosis Date   • Anesthesia     \"needs more sedation\" (can sometimes hear MD during procedure)    • Anticoagulation monitoring, special range    • Aortic stenosis     mod AS- concern for low flow severe AS with rEFof 30%   • Arterial leg ulcer ([facility]) 11/8/2018   • Atrial flutter ([facility]) 6/12/2019   • Basal cell carcinoma of left cheek 3/26/2015   • BPH 7/14/2009   • Bronchitis 12/25/2018   • CAD (coronary artery disease) 2/20/2014   • CATARACT     sanjuanita surgery complete   • CHF (congestive heart failure), NYHA class II, chronic, systolic ([facility]) E F30 in setting of atrial flutter 6/13/2019   • Chronic respiratory failure with hypoxia ([facility]) 5/8/2016   • CKD (chronic kidney disease) stage 4, GFR 15-29 ml/min ([facility]) 1/15/2010    end stage renal disease   • COPD (chronic obstructive pulmonary disease) ([facility])     wears 2.5 L o2 sometimes when he sleeps   • Detached retina    • Dialysis     m,w,f [facility]   • EMPHYSEMA    • Glaucoma     Early stage   • Gout    • Heart burn     occas   • Heart murmur     maria esther lee cardiologist   • Hypertension    • Indigestion     occas   • Kidney cyst    • Leg pain, bilateral 8/10/2015   • On supplemental oxygen therapy    • Peripheral vascular disease ([facility]) " Detail Level: Detailed 8/10/2015   • Pneumonia    • Primary insomnia 3/22/2018   • Proteinuria    • PVD (peripheral vascular disease) ([facility])    • Sleep apnea     O2 PER CANULA  AT NIGHT 2l/m     Past Surgical History:   Procedure Laterality Date   • AV FISTULA CREATION Right 8/6/2019    Procedure: CREATION, AV FISTULA -  RIGHT ARM  ,  and Ligation of Left Arm Fistula;  Surgeon: Sera Peters M.D.;  Location: SURGERY [facility];  Service: General   • CATH PLACEMENT Right 8/6/2019    Procedure: INSERTION, CATHETER - PERMA ,;  Surgeon: Sera Peters M.D.;  Location: SURGERY [facility];  Service: General   • JUSTIN  6/13/2019    Procedure: ECHOCARDIOGRAM, TRANSESOPHAGEAL;  Surgeon: Harvinder Hoang M.D.;  Location: [facility];  Service: Cardiac   • CARDIOVERSION  6/13/2019    Procedure: CARDIOVERSION;  Surgeon: Harvinder Hoang M.D.;  Location: [facility];  Service: Cardiac   • AV FISTULA CREATION Right 2/21/2019    Procedure: AV FISTULA CREATION - ARM;  Surgeon: David Cason M.D.;  Location: SURGERY [facility];  Service: General   • FEMORAL ENDARTERECTOMY Left 1/25/2019    Procedure: FEMORAL ENDARTERECTOMY;  Surgeon: David Cason M.D.;  Location: SURGERY [facility];  Service: General   • FEMORAL POPLITEAL BYPASS Left 1/25/2019    Procedure: LEFT FEMORAL POPLITEAL POLYTETRAFLUOROETHYLENE ePTFE(PROPATEN VASCULAR GRAFT) BYPASS;  Surgeon: David Cason M.D.;  Location: SURGERY [facility];  Service: General   • ANGIOGRAM Left 1/25/2019    Procedure: LEFT LEG ANGIOGRAM;  Surgeon: David Cason M.D.;  Location: SURGERY [facility];  Service: General   • ENDOSCOPY PROCEDURE  3/21/2018    Procedure: ENDOSCOPY PROCEDURE/UPPER;  Surgeon: Enrique Child D.O.;  Location: [facility];  Service: Gastroenterology   • COLONOSCOPY - ENDO  8/15/2016    Procedure: COLONOSCOPY - ENDO;  Surgeon: Mane Whatley M.D.;  Location: ENDOSCOPY [facility];   Detail Level: Simple Service:    • GASTROSCOPY WITH BIOPSY  8/13/2016    Procedure: GASTROSCOPY WITH BIOPSY;  Surgeon: Jorge Leavitt M.D.;  Location: [facility];  Service:    • RECOVERY  12/23/2015    Procedure: VASCULAR CASE-BLANKA-LEFT ARM FISTULOGRAM WITH ANGIOPLASTY;  Surgeon: Recoveryonbhavani Surgery;  Location: SURGERY PRE-POST PROC UNIT [facility];  Service:    • RECOVERY  3/24/2015    Performed by Recoveryonly Surgery at SURGERY PRE-POST PROC UNIT [facility]   • RECOVERY  7/29/2014    Performed by Ir-Recovery Surgery at SURGERY SAME DAY [facility] ORS   • RECOVERY  3/24/2014    Performed by Ir-Recovery Surgery at SURGERY [facility]   • RECOVERY  12/17/2013    Performed by Ir-Recovery Surgery at SURGERY SAME DAY [facility] ORS   • RECOVERY  7/2/2013    Performed by Ir-Recovery Surgery at SURGERY SAME DAY [facility] ORS   • AV FISTULA THROMBOLYSIS  7/2/2013    Performed by David Cason M.D. at SURGERY [facility]   • RECOVERY  1/29/2013    Performed by Ir-Recovery Surgery at SURGERY SAME DAY [facility] ORS   • RECOVERY  7/23/2012    Performed by SURGERY, IR-RECOVERY at SURGERY SAME DAY [facility] ORS   • VITRECTOMY POSTERIOR  10/11/2011    Performed by NAHUM GE at SURGERY SAME DAY [facility] ORS   • RECOVERY  8/12/2011    Performed by SURGERY, IR-RECOVERY at SURGERY SAME DAY [facility] ORS   • VITRECTOMY POSTERIOR  1/18/2011    Performed by NAHUM GE at SURGERY SAME DAY [facility] ORS   • SCLERAL BUCKLING  1/18/2011    Performed by NAHUM GE at SURGERY SAME DAY [facility] ORS   • AV FISTULOGRAM  9/17/2010    Performed by DAVID CASON at SURGERY [facility]   • ANGIOPLASTY BALLOON  9/17/2010    Performed by DAVID CASON at SURGERY [facility] ORS   • AV FISTULOGRAM  7/23/2010    Performed by DAVID CASON at SURGERY [facility]   • ANGIOPLASTY BALLOON  7/23/2010    Performed by DAVID CASON at SURGERY [facility]   • AV FISTULA REVISION  2/19/2010    Performed by MAMIE  WILLIE LANDRUM at SURGERY [facility] ORS   • AV FISTULA CREATION  2/12/2010    Performed by ALESHIA MOSCOSO at SURGERY [facility] ORS   • CATARACT PHACO WITH IOL  4/8/08    Performed by STACEY PHILLIPS at SURGERY SAME DAY [facility] ORS   • OTHER ORTHOPEDIC SURGERY  1998    right toe for facititis     Family History   Problem Relation Age of Onset   • Stroke Mother    • Hypertension Mother    • Lung Disease Father         Emphysema, resp failure   • Genitourinary () Problems Maternal Aunt         hematuria   • Hypertension Brother      Social History     Socioeconomic History   • Marital status:      Spouse name: Not on file   • Number of children: Not on file   • Years of education: Not on file   • Highest education level: Not on file   Occupational History   • Not on file   Social Needs   • Financial resource strain: Not on file   • Food insecurity:     Worry: Not on file     Inability: Not on file   • Transportation needs:     Medical: Not on file     Non-medical: Not on file   Tobacco Use   • Smoking status: Former Smoker     Packs/day: 1.00     Years: 40.00     Pack years: 40.00     Types: Cigarettes     Last attempt to quit: 1/1/2009     Years since quitting: 10.9   • Smokeless tobacco: Never Used   • Tobacco comment: 1 pk a day for 35 yrs, QUIT JAN 1 2010   Substance and Sexual Activity   • Alcohol use: No     Alcohol/week: 0.0 oz   • Drug use: No   • Sexual activity: Never     Partners: Female     Comment: , two sons, 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   Lifestyle   • Physical activity:     Days per week: Not on file     Minutes per session: Not on file   • Stress: Not on file   Relationships   • Social connections:     Talks on phone: Not on file     Gets together: Not on file     Attends Yazdanism service: Not on file     Active member of club or organization: Not on file     Attends meetings of clubs or organizations: Not on file     Relationship status: Not on file   • Intimate partner violence:      Fear of current or ex partner: Not on file     Emotionally abused: Not on file     Physically abused: Not on file     Forced sexual activity: Not on file   Other Topics Concern   • Not on file   Social History Narrative   • Not on file     No Known Allergies  Outpatient Encounter Medications as of 12/17/2019   Medication Sig Dispense Refill   • traZODone (DESYREL) 150 MG Tab      • B Complex-C-Folic Acid (DIALYVITE TABLET) Tab TAKE ONE TABLET BY MOUTH DAILY 90 Tab 3   • Calcium Acetate, Phos Binder, 667 MG Cap TAKE 3 CAPSULES BY MOUTH WITH MEALS (3 MEALS) AND 2 CAPSULES WITH SNACKS (2 SNACKS) 420 Cap 11   • torsemide (DEMADEX) 10 MG tablet TAKE THREE TABLETS BY MOUTH DAILY 270 Tab 3   • Non Formulary Request 2 liters of Oxygen at home     • warfarin (COUMADIN) 5 MG Tab 1.5 tablets  Tonight = 7.5 mg then 1 tab 5mg every evening 45 Tab 0   • tamsulosin (FLOMAX) 0.4 MG capsule Take 1 Cap by mouth ONE-HALF HOUR AFTER BREAKFAST. 90 Cap 0   • omeprazole (PRILOSEC) 40 MG delayed-release capsule Take 40 mg by mouth every morning.     • calcium acetate (PHOS-LO) 667 MG Cap Take 1,334 mg by mouth 3 times a day, with meals.     • levalbuterol (XOPENEX) 1.25 MG/3ML Nebu Soln 3 mL by Nebulization route every four hours as needed for Shortness of Breath. 120 Bullet 11   • pravastatin (PRAVACHOL) 20 MG Tab Take 20 mg by mouth every evening.     • tamsulosin (FLOMAX) 0.4 MG capsule TAKE TWO CAPSULES BY MOUTH DAILY 30 MINUTES AFTER BREAKFAST (Patient not taking: Reported on 12/17/2019) 90 Cap 3   • azithromycin (ZITHROMAX) 250 MG Tab Take 2 tabs on day 1 then take 1 tab daily until complete (Patient not taking: Reported on 12/17/2019) 6 Tab 0   • gabapentin (NEURONTIN) 300 MG Cap Take 1 Cap by mouth 3 times a day.     • albuterol 108 (90 Base) MCG/ACT Aero Soln inhalation aerosol Inhale 2 Puffs by mouth every four hours as needed. (Patient not taking: Reported on 12/17/2019) 8.5 g 0     No facility-administered encounter  "medications on file as of 12/17/2019.      ROS       Objective:   /52 (BP Location: Left arm, Patient Position: Sitting, BP Cuff Size: Adult)   Pulse 90   Ht 1.727 m (5' 8\")   Wt 65.8 kg (145 lb)   SpO2 91%   BMI 22.05 kg/m²     Physical Exam   Constitutional: He appears well-developed and well-nourished.   Neck: No JVD present.   Cardiovascular: Normal rate and regular rhythm.  No extrasystoles are present.   Murmur (3/6 systolic at the base) heard.  Pulmonary/Chest: Effort normal. He has decreased breath sounds in the right lower field. He has wheezes. He has no rales.   Abdominal: Soft. There is no tenderness.   Musculoskeletal:         General: No edema.      Lab Results   Component Value Date/Time    CHOLSTRLTOT 90 (L) 06/22/2017 07:55 AM    LDL 26 06/22/2017 07:55 AM    HDL 57 06/22/2017 07:55 AM    TRIGLYCERIDE 37 06/22/2017 07:55 AM       Lab Results   Component Value Date/Time    SODIUM 139 10/27/2019 03:24 AM    POTASSIUM 4.8 10/27/2019 03:24 AM    CHLORIDE 98 10/27/2019 03:24 AM    CO2 24 10/27/2019 03:24 AM    GLUCOSE 94 10/27/2019 03:24 AM    BUN 54 (H) 10/27/2019 03:24 AM    CREATININE 6.41 (HH) 10/27/2019 03:24 AM    CREATININE 2.1 (H) 05/06/2009 10:08 AM     Lab Results   Component Value Date/Time    ALKPHOSPHAT 114 (H) 10/27/2019 03:24 AM    ASTSGOT 16 10/27/2019 03:24 AM    ALTSGPT 10 10/27/2019 03:24 AM    TBILIRUBIN 0.5 10/27/2019 03:24 AM      Lab Results   Component Value Date/Time    BNPBTYPENAT 1583 (H) 04/15/2017 04:15 PM              Echocardiography Laboratory    CONCLUSIONS  Compared to the images of the study done 8/1/19 - there has been   resolution of pericardial effusion.  Left ventricular ejection fraction is visually estimated to be 55%.  Moderate to severe aortic stenosis.Vmax is 3.50  m/s.  Transvalvular gradients are - Peak: 49 mmHg, Mean:  30 mmHg.  Dimensionless index is 0.2.  Right ventricular systolic pressure is estimated to be 55 mmHg.      PETER, " Detail Level: Generalized EDVIN CASTELAN  Exam Date:         08/02/2019     Limited echocardiogram from yesterday:  Pericardium  Normal pericardium without effusion.    Transthoracic  Echo Report        Echocardiography Laboratory     CONCLUSIONS  Prior Echo - 8/2/19, AS is now severe.  Left ventricular ejection fraction is visually estimated to be 60%.  Mild concentric left ventricular hypertrophy.  Grade II diastolic dysfunction.  Severely dilated left atrium.  Mild mitral regurgitation.  Severe aortic stenosis: Vmax is 4.28 m/s, MG 49 mmHg.  Mild tricuspid regurgitation.  Estimated right ventricular systolic pressure  is 50 mmHg.  Right atrial pressure is estimated to be 8 mmHg.     .     EDVIN CASTELAN  Exam Date:         10/24/2019    Assessment:     1. Severe aortic stenosis     2. Essential hypertension     3. Dyslipidemia     4. Elevated coronary artery calcium score     5. Chronic anticoagulation         Medical Decision Making:  Today's Assessment / Status / Plan:     Mr. Castelan has had progression of his aortic stenosis.  He has seen [facility] cardiology and is being considered for TAVR.  He has to decide if he wants to proceed since he is at fairly high risk.  He is otherwise doing fairly well clinically.  We discussed this including the risk of death from symptomatic aortic stenosis.  He is probably going to decide to proceed to TAVR.  He will follow-up in about 3 months.   Detail Level: Zone

## 2024-05-12 NOTE — PROGRESS NOTES
1915 Received report. Called [facility] to have dialysis set up for patient. Spoke to Kyleigh, RN from [facility], who will come see patient tonight.    1920 Patient changed to patient room 316-2.    1930 Pt arrived to unit via gurney. Ambulated from gurney to bed, standby assist. Tele monitor applied, vitals taken. Pt assessed. A&O x4. Patient has bilateral fistulas, BP is taken on leg.  Turkey sandwich and juice provided along with ice chips and water. Admit profile and med rec complete. Discussed POC with pt, including close cardiac monitoring, JUSTIN, echo, and cardioversion ordered by cardiologist along with dialysis to be done tonight. Welcome folder provided and discussed. Communication board filled out. Questions and concerns addressed, verbalized understanding. Fall precautions in place. Pt demonstrates ability to use call light appropriately. Pt left in lowest position. Bed locked and low.    2000 Dialysis RN at bedside, ordered medications to be given after dialysis.     2200 Dialysis RN at bedside, dialysis to be completed in 2.5 hours.    A (CATHETER BLN TREK SLIM SEAL 2.5MM 15MM RX ULTRA LOWPRFL) balloon was inflated in the right coronary artery and was removed in tact.     The balloon was inflated at 10 delfino for 5 seconds at 5/12/2024 11:30 AM.

## 2024-11-07 NOTE — PLAN OF CARE (IOPOC)
Addendum: Discussed with Dr Najjar, patient with K 6.2 this AM, no events on tele and was feeling well, he was ultimately discharged before plans to have short run of HD today could be set up. Talked with patient over the phone, recommended to him that he should come back to the hospital for HD however he does not want to do so. Understood the risks associated with hyperK. advised to follow up in the morning ASAP for HD,  He will call in the AM to see about emergent HD chair. Discussed with Dr Najjar.    independent independent

## 2025-02-10 NOTE — PROGRESS NOTES
"Came into patients room to give evening medications. Patient very upset saying he wants \"normal\" food and not the clear liquid diet. Told patient that I spoke with Dr. Cole and MD still wants patient to be on clear liquid diet. Patient said \"NO! That's not what the GI doctor told me, they said I can have food! I REFUSE to take any pills\" Told patient this RN was not updated by any doctor about current diet order. Told patient that I would call GI. Patient very upset and said \"I might as well call them myself, this is ridiculous!\"      " Nice to meet you today.  Below is our plan we discussed.-  CARLIN Vargas  Bariatric Task List  Maintain lbs prior to surgery.    Have preoperative laboratory tests drawn. -  Call [phone] for an appt.    Psychological Evaluation with MMPI and clearance for weight loss surgery.- Call [phone] to schedule  Achieve clearance from dietitian to see surgeon. Call [phone] to schedule   Mental Health Letter of clearance  for psychiatrist  Cardiac Clearance  ________________________________________________________________  Road to Surgery:   1. Complete the above tasklist  2. Following tasklist completion, see the surgeon, go over your surgery, and sign consents  3. File will be sent for insurance approval  4.Once approved, our  will call to set up your surgery  (It usually takes 4-6 months to get to surgery from your initial consult)  After Surgery:   Remember, Obesity is a chronic disease, At some point weight gain will occur and hunger may return.   Follow up is important to keep your weight off and your vitamin levels up.  Labs will be monitored every 3 months for the first year and yearly thereafter.  Vitamin supplementation is a lifelong. You will take:  2 Multivitamins containing 18mg of iron  B-12 daily or by injection monthly  Vitamin D3 5000 IU daily   Calcium citrate 2 daily  Thiamine 100 mg weekly   Iron supplement once daily if you are a menstruating female  Prevent ulcers by avoiding tobacco and anti-inflammatories (NSAIDS) forever.  NSAIDS examples: Aspirin, Ibuprofen, Naproxen. Tylenol is fine.    Alcohol affects you differently. There is a 10% increase of Alcohol Use Disorder in patients with bariatric surgery.   If you have even ONE drink DO NOT DRIVE.

## 2025-04-15 NOTE — PROGRESS NOTES
1900: Report received from Melanie BYRD. Pt A&Ox 4, denies pain, and is up ambulating in the room. Fall precautions in place with call light in reach. POC discussed. All questions and concerns addressed.      Bridge Appointment completed: Reviewed Discharge Instructions with patient.    Patient verbalizes understanding and agreement with the discharge plan using the teachback method.     Referral for Outpatient Tobacco Cessation Counseling, upon discharge (judy X if applicable and completed):  nonsmoker  ( )  Hospital staff assisted patient to call Quit Line or faxed referral                                   during hospitalization                  ( )  Recognizing danger situations (included triggers and roadblocks), if not completed on admission                    ( )  Coping skills (new ways to manage stress, exercise, relaxation techniques, changing routine, distraction), if not completed on admission                                                           ( )  Basic information about quitting (benefits of quitting, techniques in how to quit, available resources, if not completed on admission  ( ) [facility]   ( ) Patient refused referral  ( ) Patient refused counseling  ( ) Patient refused smoking cessation medication upon discharge    Vaccinations (juyd X if applicable and completed):  ( ) Patient states already received influenza vaccine elsewhere  ( ) Patient received influenza vaccine during this hospitalization  ( ) Patient refused influenza vaccine at this time  ( x) Not offered

## 2025-05-06 NOTE — PROGRESS NOTES
Diagnostic mammogram ordered.   [facility] Progress Note    Hemodialysis treatment ordered today per Dr. Enriquez x 3 hours.   Treatment initiated at 1724.     Refused heparin.   Pressor x 1 titrated PRN by ICU RN for BP support during HD.   See paper flow sheet for details.     Net UF 3,000 mL.     Needles removed from access site. Dressings applied and sites held x 10 minutes; verified no bleeding. Positive bruit/thrill post tx.   Staff RN to monitor AVF/G for breakthrough bleeding. Should breakthrough bleeding occur, staff RN to apply pressure to access sites until bleeding resolved.   Notify Dialysis and Nephrologist for follow-up.    Report given to Primary RN.     Supervising Physician: Dr. Barney SALGUERO       SUBJECTIVE:  HPI: Jose Maria Mitchell is a 64 year old male with chronic prostatitis. He is urinating every 2-3 hours during the day, and once at night. His stream is moderate. He is taking alfuzosin 10 mg daily. His most recent PSA was 0.70 (seen in care everywhere). He has occasional painful ejaculation,  he is also having some occasional dysuria. He denies nausea, vomiting,  and fevers.  OBJECTIVE:   Physical Exam:       Visit Vitals  Temp 97.9 °F (36.6 °C) (Temporal)   Ht 5' 7\" (1.702 m)   Wt 72.1 kg (159 lb)   BMI 24.90 kg/m²       Constitutional: Well developed, well nourished male   Psych: Alert and oriented to person, place, and time. Cooperative.   Skin: Normal color/tone. Without obvious lesions  Lungs: No laboring noted  Abdomen: Soft, non-tender, not distended. No organomegaly.  Lymph: No inguinal nodes  Penis: no lesions or discharge  Testes: Bilaterally descended, no mass, palpable vas, no hydrocele, no varicocele  Prostate: 30 g, no nodule, no palpable seminal vesicles  Extremities: No cyanosis, clubbing, or edema     ASSESSMENT:    Chronic prostatitis  Dysuria    PLAN:   UTI screen  Start Bactrim DS twice daily for 4 weeks  Continue alfuzosin 10 mg daily  Discussed medication dosage, usage, goals of therapy, and side effects.  Follow up in 12 months    Patient verbalized understanding.     Electronically signed by: Eyal Toussaint, SABINA  8/17/2021

## (undated) DEVICE — SYRINGE 20 ML LS (48/BX 4BX/CA)

## (undated) DEVICE — SET EXTENSION WITH 2 PORTS (48EA/CA) ***PART #2C8610 IS A SUBSTITUTE*****

## (undated) DEVICE — CANISTER SUCTION RIGID RED 1500CC (40EA/CA)

## (undated) DEVICE — TRANSDUCER ADULT DISP. SINGLE BONDED STERILE - (20EA/CA)

## (undated) DEVICE — SENSOR SPO2 ADULT LNCS ADTX (20/BX) ORDER ITEM #19593

## (undated) DEVICE — SYRINGE SAFETY 5 ML 18 GA X 1-1/2 BLUNT LL (100/BX 4BX/CA)

## (undated) DEVICE — GOWN SURGEONS X-LARGE - DISP. (30/CA)

## (undated) DEVICE — SUTURE 4-0 SILK 12 X 18 INCH - (36/BX)

## (undated) DEVICE — SOD. CHL. INJ. 0.9% 250 ML - (36/CA 50CA/PF)

## (undated) DEVICE — SET FLUID WARMING STANDARD FLOW - (10/CA)

## (undated) DEVICE — GLOVE BIOGEL INDICATOR SZ 8 SURGICAL PF LTX - (50/BX 4BX/CA)

## (undated) DEVICE — MASK WITH FACE SHIELD (25/BX 4BX/CA)

## (undated) DEVICE — GLOVE SZ 7.5 LF PROTEXIS (50PR/BX)

## (undated) DEVICE — FILM CASSETTE ENDO

## (undated) DEVICE — TUBE CONNECTING SUCTION - CLEAR PLASTIC STERILE 72 IN (50EA/CA)

## (undated) DEVICE — PROTECTOR ULNA NERVE - (36PR/CA)

## (undated) DEVICE — SENSOR SPO2 NEO LNCS ADHESIVE (20/BX) SEE USER NOTES

## (undated) DEVICE — MASK ANESTHESIA ADULT  - (100/CA)

## (undated) DEVICE — GELAQUASONIC 100 ULTRASOUND - 48/BX 20GM STERILE FOIL POUCH

## (undated) DEVICE — GLOVE BIOGEL PI INDICATOR SZ 8.0 SURGICAL PF LF -(50/BX 4BX/CA)

## (undated) DEVICE — SYRINGE DISP. 60 CC LL - (30/BX, 12BX/CA)**WHEN THESE ARE GONE ORDER #500206**

## (undated) DEVICE — CATHETER IV SAFETY 20 GA X 1-1/4 (50/BX)

## (undated) DEVICE — HEAD HOLDER JUNIOR/ADULT

## (undated) DEVICE — SET LEADWIRE 5 LEAD BEDSIDE DISPOSABLE ECG (1SET OF 5/EA)

## (undated) DEVICE — KIT  I.V. START (100EA/CA)

## (undated) DEVICE — DRAPE SURGICAL U 77X120 - (10/CA)

## (undated) DEVICE — WATER IRRIGATION STERILE 1000ML (12EA/CA)

## (undated) DEVICE — ELECTRODE DUAL RETURN W/ CORD - (50/PK)

## (undated) DEVICE — SLEEVE, VASO, THIGH, MED

## (undated) DEVICE — CAPSULE VIDEO PILLCAM (10EA/BX)

## (undated) DEVICE — CONTAINER, SPECIMEN, STERILE

## (undated) DEVICE — GLOVE SZ 7.5 BIOGEL PI MICRO - PF LF (50PR/BX)

## (undated) DEVICE — LACTATED RINGERS INJ 1000 ML - (14EA/CA 60CA/PF)

## (undated) DEVICE — GOLD PROBE 7FR (5/BX)

## (undated) DEVICE — BLADE SURGICAL #15 - (50/BX 3BX/CA)

## (undated) DEVICE — KIT ANESTHESIA W/CIRCUIT & 3/LT BAG W/FILTER (20EA/CA)

## (undated) DEVICE — KIT ROOM DECONTAMINATION

## (undated) DEVICE — ELECTRODE 850 FOAM ADHESIVE - HYDROGEL RADIOTRNSPRNT (50/PK)

## (undated) DEVICE — TOWELS CLOTH SURGICAL - (4/PK 20PK/CA)

## (undated) DEVICE — SUTURE 6-0 PROLENE BV-1 D/A 30 (36PK/BX)"

## (undated) DEVICE — VESSELOOP MINI BLUE STERILE - SURG-I-LOOP (10EA/BX)

## (undated) DEVICE — CHLORAPREP 26 ML APPLICATOR - ORANGE TINT(25/CA)

## (undated) DEVICE — SODIUM CHL IRRIGATION 0.9% 1000ML (12EA/CA)

## (undated) DEVICE — VESSELOOP MAXI BLUE STERILE- SURG-I-LOOP (10EA/BX)

## (undated) DEVICE — SUTURE 2-0 VICRYL PLUS SH - 27 INCH (36/BX)

## (undated) DEVICE — SUTURE CV

## (undated) DEVICE — SOD. CHL 10CC SYRINGE PREFILL - W/10 CC (30/BX)

## (undated) DEVICE — SUTURE GOR-TEX CV-6 TT-9 (36PK/BX)

## (undated) DEVICE — GUIDE TRACHE TUBE INTUBATING STYLET 5.0-10.0MM 14FR (20EA/PK)

## (undated) DEVICE — DEVICE HEMOSTATIC CLIPPING RESOLUTION 360 DEGREES (20EA/BX)

## (undated) DEVICE — PACK MINOR BASIN - (2EA/CA)

## (undated) DEVICE — SUTURE 4-0 MONOCRYL PLUS PS-2 - 27 INCH (36/BX)

## (undated) DEVICE — GOWN WARMING STANDARD FLEX - (30/CA)

## (undated) DEVICE — SUTURE 3-0 VICRYL PLUS SH - 8X 18 INCH (12/BX)

## (undated) DEVICE — BLADE SURGICAL #11 - (50/BX)

## (undated) DEVICE — SUTURE 3-0 SILK 12 X 18 IN - (36/BX)

## (undated) DEVICE — GOWN SURGEONS LARGE - (32/CA)

## (undated) DEVICE — TUBING CLEARLINK DUO-VENT - C-FLO (48EA/CA)

## (undated) DEVICE — CATHETER IV 20 GA X 1-1/4 ---SURG.& SDS ONLY--- (50EA/BX)

## (undated) DEVICE — CATHETER IRRIG OCC PRUITT 6FR

## (undated) DEVICE — CLIP MED INTNL HRZN TI ESCP - (25/BX)

## (undated) DEVICE — TUBE CONNECT SUCTION CLEAR 120 X 1/4" (50EA/CA)"

## (undated) DEVICE — DRAPE C-ARM LARGE 41IN X 74 IN - (10/BX 2BX/CA)

## (undated) DEVICE — PAD PREP 24 X 48 CUFFED - (100/CA)

## (undated) DEVICE — NEEDLE NON SAFETY 25 GA X 1 1/2 IN HYPO (100EA/BX)

## (undated) DEVICE — GLOVE, LITE (PAIR)

## (undated) DEVICE — PENCIL ELECTSURG 10FT BTN SWH - (50/CA)

## (undated) DEVICE — TUBING O2 7FT TIP SMTH BORE - (50/CA)

## (undated) DEVICE — CANISTER SUCTION 3000ML MECHANICAL FILTER AUTO SHUTOFF MEDI-VAC NONSTERILE LF DISP  (40EA/CA)

## (undated) DEVICE — KIT CUSTOM PROCEDURE SINGLE FOR ENDO  (15/CA)

## (undated) DEVICE — CLIP SM INTNL HRZN TI ESCP LGT - (24EA/PK 25PK/BX)

## (undated) DEVICE — DRAPE 36X28IN RAD CARM BND BG - (25/CA) O

## (undated) DEVICE — TUBE SUCTION YANKAUER  1/4 X 6FT (20EA/CA)"

## (undated) DEVICE — GLOVE BIOGEL SZ 6.5 SURGICAL PF LTX (50PR/BX 4BX/CA)

## (undated) DEVICE — SPONGE XRAY 8X4 STERL. 12PL - (10EA/TY 80TY/CA)

## (undated) DEVICE — CONTAINER SPECIMEN BAG OR - STERILE 4 OZ W/LID (100EA/CA)

## (undated) DEVICE — SUTURE 4-0 VICRYL PLUS RB-1 - 27 INCH (36/BX)

## (undated) DEVICE — DISH PETRI STERILE (50EA/CA)

## (undated) DEVICE — TUBE NG SALEM SUMP 14FR (50EA/BX)

## (undated) DEVICE — SYRINGE SAFETY 10 ML 18 GA X 1 1/2 BLUNT LL (100/BX 4BX/CA)

## (undated) DEVICE — TUBE E-T HI-LO CUFF 7.0MM (10EA/PK)

## (undated) DEVICE — SUTURE GORE-TEX CV-5 TT-13 (12/BX)

## (undated) DEVICE — SOD. CHL. INJ. 0.9% 1000 ML - (14EA/CA 60CA/PF)

## (undated) DEVICE — NEPTUNE 4 PORT MANIFOLD - (20/PK)

## (undated) DEVICE — SUCTION INSTRUMENT YANKAUER BULBOUS TIP W/O VENT (50EA/CA)

## (undated) DEVICE — SPONGE GAUZESTER 4 X 4 4PLY - (128PK/CA)

## (undated) DEVICE — Device

## (undated) DEVICE — BITE BLOCK ADULT 60FR (100EA/CA)

## (undated) DEVICE — SUTURE 3-0 VICRYL PLUS CT-1 - 36 INCH (36/BX)

## (undated) DEVICE — TRAP POLYP E-TRAP (25EA/BX)

## (undated) DEVICE — SUTURE 6-0 PROLENE BV-1 D/A 24 (36PK/BX)"

## (undated) DEVICE — SYRINGE 10 ML CONTROL LL (25EA/BX 4BX/CA)

## (undated) DEVICE — BAG DECANTER (50EA/CS)

## (undated) DEVICE — DRAPE STRLE REG TOWEL 18X24 - (10/BX 4BX/CA)"

## (undated) DEVICE — SUTURE GENERAL

## (undated) DEVICE — SPONGE GAUZE NON-STERILE 2X2 - 4-PLY (200/PK 40PK/CA)

## (undated) DEVICE — DRAPE LARGE 3 QUARTER - (20/CA)

## (undated) DEVICE — SUTURE 7-0 PROLENE 24BV175-6 - EP8735H (36/BX)"

## (undated) DEVICE — SURGIFOAM (SIZE 100) - (6EA/CA)

## (undated) DEVICE — SPONGE KITTNER DISSECTORS - (5EA/PK 50PK/CA)

## (undated) DEVICE — KIT SURGIFLO W/OUT THROMBIN - (6EA/CA)

## (undated) DEVICE — GLOVE BIOGEL SZ 8 SURGICAL PF LTX - (50PR/BX 4BX/CA)

## (undated) DEVICE — JELLY SURGILUBE STERILE TUBE 4.25 OZ (1/EA)

## (undated) DEVICE — SYRINGE 30 ML LL (56/BX)

## (undated) DEVICE — SUTURE 3-0 ETHILON FS-1 - (36/BX) 30 INCH

## (undated) DEVICE — SUTURE 7-0 PROLENE BV-1 D/A 30 (36PK/BX)"

## (undated) DEVICE — DRESSING,POST OP BORDER 4INX6IN AG

## (undated) DEVICE — DECANTER FLD BLS - (50/CA)

## (undated) DEVICE — GLOVE BIOGEL PI INDICATOR SZ 7.0 SURGICAL PF LF - (50/BX 4BX/CA)

## (undated) DEVICE — CLEANER ELECTRO-SURGICAL TIP - (25/BX 4BX/CA)

## (undated) DEVICE — SPONGE GAUZESTER. 2X2 4-PL - (2/PK 50PK/BX 30BX/CS)

## (undated) DEVICE — SODIUM CHL. INJ. 0.9% 500ML (24EA/CA 50CA/PF)

## (undated) DEVICE — KIT RADIAL ARTERY 20GA W/MAX BARRIER AND BIOPATCH  (5EA/CA) #10740 IS FOR THE SET RADIAL ARTERIAL

## (undated) DEVICE — SHEET THYROID - (10EA/CA)

## (undated) DEVICE — FORCEP RADIAL JAW 4 STANDARD CAPACITY W/NEEDLE 240CM (40EA/BX)

## (undated) DEVICE — DRESSING TRANSPARENT FILM TEGADERM 2.375 X 2.75"  (100EA/BX)"

## (undated) DEVICE — GLOVE SZ 6.5 BIOGEL PI MICRO - PF LF (50PR/BX)

## (undated) DEVICE — TUBE E-T HI-LO CUFF 8.0MM (10EA/PK)

## (undated) DEVICE — SUTURE 5-0 PROLENE BV-1 HEMOSEAL (36PK/BX)

## (undated) DEVICE — PEN SKIN MARKER W/RULER - (50EA/BX)

## (undated) DEVICE — SET EXTENSION 31IN APPX 3.2ML WITH CLAMP (50/CA)WAS 4610-03

## (undated) DEVICE — SYRINGE 20 ML LL (50EA/BX 4BX/CA)

## (undated) DEVICE — CANNULA W/ SUPPLY TUBING O2 - (50/CA)

## (undated) DEVICE — BLOCK

## (undated) DEVICE — SPONGE GAUZE NON-STERILE 4X4 - (2000/CA 10PK/CA)

## (undated) DEVICE — MANIFOLD NEPTUNE 1 PORT (20/PK)

## (undated) DEVICE — SUTURE 4-0 MONOCRYL PLUS PS-1 - 27 INCH (36/BX)

## (undated) DEVICE — CUFF BP ADULT MEDIUM DISPOSABLE (20EA/CA)

## (undated) DEVICE — GLOVE BIOGEL SZ 7.5 SURGICAL PF LTX - (50PR/BX 4BX/CA)

## (undated) DEVICE — DRESSING TRANSPARENT FILM TEGADERM 4 X 4.75" (50EA/BX)"

## (undated) DEVICE — DRAIN PENROSE 1 IN X 18 IN - STERILE (25EA/BX)

## (undated) DEVICE — STAPLER SKIN DISP - (6/BX 10BX/CA) VISISTAT

## (undated) DEVICE — BLADE SURGICAL CLIPPER - (50EA/CA)

## (undated) DEVICE — STOPCOCK MALE 4-WAY - (50/CA)

## (undated) DEVICE — SUTURE 4-0 VICRYL PLUS FS-2 - 27 INCH (36/BX)

## (undated) DEVICE — SYRINGE SAFETY 3 ML 18 GA X 1 1/2 BLUNT LL (100/BX 8BX/CA)

## (undated) DEVICE — CATHETER IV 18 GA X 1-3/4 ---SURG.& SDS ONLY---

## (undated) DEVICE — SUTURE 5-0 PROLENE C-1 D/A 24 (36PK/BX)"

## (undated) DEVICE — SPONGE GAUZE STER 4X4 8-PL - (2/PK 50PK/BX 12BX/CS)

## (undated) DEVICE — PACK MAJOR BASIN - (2EA/CA)

## (undated) DEVICE — PROBE ERBE FIAPC 2.3MM (10EA/BX)

## (undated) DEVICE — SUTURE 2-0 SILK 12 X 18" (36PK/BX)"

## (undated) DEVICE — KIT ULTRASND COVER - (20EA/CA)

## (undated) DEVICE — BAG ISOLATION 20X20 INVISI - SHEILD (10EA/BX)

## (undated) DEVICE — GLOVE BIOGEL PI INDICATOR SZ 6.5 SURGICAL PF LF - (50/BX 4BX/CA)

## (undated) DEVICE — SURGIFOAM (12X7) - (12EA/CA)